# Patient Record
Sex: FEMALE | Race: WHITE | Employment: OTHER | URBAN - METROPOLITAN AREA
[De-identification: names, ages, dates, MRNs, and addresses within clinical notes are randomized per-mention and may not be internally consistent; named-entity substitution may affect disease eponyms.]

---

## 2017-02-02 ENCOUNTER — OFFICE VISIT (OUTPATIENT)
Dept: CARDIOLOGY CLINIC | Age: 77
End: 2017-02-02

## 2017-02-02 VITALS
HEIGHT: 63 IN | SYSTOLIC BLOOD PRESSURE: 136 MMHG | BODY MASS INDEX: 26.54 KG/M2 | OXYGEN SATURATION: 97 % | HEART RATE: 60 BPM | WEIGHT: 149.8 LBS | DIASTOLIC BLOOD PRESSURE: 72 MMHG

## 2017-02-02 DIAGNOSIS — I48.0 PAROXYSMAL ATRIAL FIBRILLATION (HCC): ICD-10-CM

## 2017-02-02 DIAGNOSIS — R94.39 ABNORMAL STRESS TEST: Primary | ICD-10-CM

## 2017-02-02 PROCEDURE — G8400 PT W/DXA NO RESULTS DOC: HCPCS | Performed by: INTERNAL MEDICINE

## 2017-02-02 PROCEDURE — 93000 ELECTROCARDIOGRAM COMPLETE: CPT | Performed by: INTERNAL MEDICINE

## 2017-02-02 PROCEDURE — G8484 FLU IMMUNIZE NO ADMIN: HCPCS | Performed by: INTERNAL MEDICINE

## 2017-02-02 PROCEDURE — G8427 DOCREV CUR MEDS BY ELIG CLIN: HCPCS | Performed by: INTERNAL MEDICINE

## 2017-02-02 PROCEDURE — 1090F PRES/ABSN URINE INCON ASSESS: CPT | Performed by: INTERNAL MEDICINE

## 2017-02-02 PROCEDURE — 1123F ACP DISCUSS/DSCN MKR DOCD: CPT | Performed by: INTERNAL MEDICINE

## 2017-02-02 PROCEDURE — 1036F TOBACCO NON-USER: CPT | Performed by: INTERNAL MEDICINE

## 2017-02-02 PROCEDURE — 99204 OFFICE O/P NEW MOD 45 MIN: CPT | Performed by: INTERNAL MEDICINE

## 2017-02-02 PROCEDURE — G8420 CALC BMI NORM PARAMETERS: HCPCS | Performed by: INTERNAL MEDICINE

## 2017-02-02 PROCEDURE — 4040F PNEUMOC VAC/ADMIN/RCVD: CPT | Performed by: INTERNAL MEDICINE

## 2017-02-02 PROCEDURE — G8598 ASA/ANTIPLAT THER USED: HCPCS | Performed by: INTERNAL MEDICINE

## 2017-02-02 RX ORDER — HYDROCHLOROTHIAZIDE 25 MG/1
25 TABLET ORAL DAILY PRN
Status: ON HOLD | COMMUNITY
End: 2017-02-03 | Stop reason: HOSPADM

## 2017-02-02 RX ORDER — CLOPIDOGREL BISULFATE 75 MG/1
75 TABLET ORAL DAILY
COMMUNITY
End: 2018-01-09 | Stop reason: SDUPTHER

## 2017-02-02 RX ORDER — AMLODIPINE BESYLATE 10 MG/1
10 TABLET ORAL DAILY
COMMUNITY
End: 2018-01-09 | Stop reason: SDUPTHER

## 2017-03-01 ENCOUNTER — OFFICE VISIT (OUTPATIENT)
Dept: CARDIOLOGY CLINIC | Age: 77
End: 2017-03-01

## 2017-03-01 VITALS
BODY MASS INDEX: 27.15 KG/M2 | WEIGHT: 153.25 LBS | DIASTOLIC BLOOD PRESSURE: 60 MMHG | SYSTOLIC BLOOD PRESSURE: 132 MMHG

## 2017-03-01 DIAGNOSIS — I48.0 PAROXYSMAL ATRIAL FIBRILLATION (HCC): Chronic | ICD-10-CM

## 2017-03-01 DIAGNOSIS — I10 ESSENTIAL HYPERTENSION, BENIGN: Primary | Chronic | ICD-10-CM

## 2017-03-01 DIAGNOSIS — E78.2 MIXED HYPERLIPIDEMIA: Chronic | ICD-10-CM

## 2017-03-01 DIAGNOSIS — I25.10 ATHEROSCLEROSIS OF NATIVE CORONARY ARTERY OF NATIVE HEART WITHOUT ANGINA PECTORIS: Chronic | ICD-10-CM

## 2017-03-01 PROCEDURE — 1090F PRES/ABSN URINE INCON ASSESS: CPT | Performed by: NURSE PRACTITIONER

## 2017-03-01 PROCEDURE — 99214 OFFICE O/P EST MOD 30 MIN: CPT | Performed by: NURSE PRACTITIONER

## 2017-03-01 PROCEDURE — 4040F PNEUMOC VAC/ADMIN/RCVD: CPT | Performed by: NURSE PRACTITIONER

## 2017-03-01 PROCEDURE — 1036F TOBACCO NON-USER: CPT | Performed by: NURSE PRACTITIONER

## 2017-03-01 PROCEDURE — G8427 DOCREV CUR MEDS BY ELIG CLIN: HCPCS | Performed by: NURSE PRACTITIONER

## 2017-03-01 PROCEDURE — G8400 PT W/DXA NO RESULTS DOC: HCPCS | Performed by: NURSE PRACTITIONER

## 2017-03-01 PROCEDURE — G8484 FLU IMMUNIZE NO ADMIN: HCPCS | Performed by: NURSE PRACTITIONER

## 2017-03-01 PROCEDURE — G8420 CALC BMI NORM PARAMETERS: HCPCS | Performed by: NURSE PRACTITIONER

## 2017-03-01 PROCEDURE — 1123F ACP DISCUSS/DSCN MKR DOCD: CPT | Performed by: NURSE PRACTITIONER

## 2017-03-01 PROCEDURE — G8598 ASA/ANTIPLAT THER USED: HCPCS | Performed by: NURSE PRACTITIONER

## 2017-03-02 ENCOUNTER — OFFICE VISIT (OUTPATIENT)
Dept: FAMILY MEDICINE CLINIC | Age: 77
End: 2017-03-02

## 2017-03-02 ENCOUNTER — TELEPHONE (OUTPATIENT)
Dept: CARDIOLOGY CLINIC | Age: 77
End: 2017-03-02

## 2017-03-02 VITALS
WEIGHT: 151.8 LBS | HEART RATE: 62 BPM | SYSTOLIC BLOOD PRESSURE: 120 MMHG | BODY MASS INDEX: 26.89 KG/M2 | OXYGEN SATURATION: 98 % | DIASTOLIC BLOOD PRESSURE: 90 MMHG

## 2017-03-02 DIAGNOSIS — M1A.00X0 CHRONIC GOUTY ARTHROPATHY: ICD-10-CM

## 2017-03-02 DIAGNOSIS — I10 ESSENTIAL HYPERTENSION, BENIGN: Chronic | ICD-10-CM

## 2017-03-02 DIAGNOSIS — J98.4 RESTRICTIVE LUNG DISEASE: ICD-10-CM

## 2017-03-02 DIAGNOSIS — N60.19 FIBROCYSTIC BREAST, UNSPECIFIED LATERALITY: ICD-10-CM

## 2017-03-02 DIAGNOSIS — I25.10 ATHEROSCLEROSIS OF NATIVE CORONARY ARTERY OF NATIVE HEART WITHOUT ANGINA PECTORIS: Primary | Chronic | ICD-10-CM

## 2017-03-02 DIAGNOSIS — E78.2 MIXED HYPERLIPIDEMIA: Chronic | ICD-10-CM

## 2017-03-02 DIAGNOSIS — N18.30 CRF (CHRONIC RENAL FAILURE), STAGE 3 (MODERATE) (HCC): ICD-10-CM

## 2017-03-02 DIAGNOSIS — E03.9 ACQUIRED HYPOTHYROIDISM: ICD-10-CM

## 2017-03-02 PROCEDURE — G8598 ASA/ANTIPLAT THER USED: HCPCS | Performed by: FAMILY MEDICINE

## 2017-03-02 PROCEDURE — G8427 DOCREV CUR MEDS BY ELIG CLIN: HCPCS | Performed by: FAMILY MEDICINE

## 2017-03-02 PROCEDURE — G8484 FLU IMMUNIZE NO ADMIN: HCPCS | Performed by: FAMILY MEDICINE

## 2017-03-02 PROCEDURE — 99214 OFFICE O/P EST MOD 30 MIN: CPT | Performed by: FAMILY MEDICINE

## 2017-03-02 PROCEDURE — 4040F PNEUMOC VAC/ADMIN/RCVD: CPT | Performed by: FAMILY MEDICINE

## 2017-03-02 PROCEDURE — 1036F TOBACCO NON-USER: CPT | Performed by: FAMILY MEDICINE

## 2017-03-02 PROCEDURE — G8420 CALC BMI NORM PARAMETERS: HCPCS | Performed by: FAMILY MEDICINE

## 2017-03-02 PROCEDURE — G8400 PT W/DXA NO RESULTS DOC: HCPCS | Performed by: FAMILY MEDICINE

## 2017-03-02 PROCEDURE — 1090F PRES/ABSN URINE INCON ASSESS: CPT | Performed by: FAMILY MEDICINE

## 2017-03-02 PROCEDURE — 1123F ACP DISCUSS/DSCN MKR DOCD: CPT | Performed by: FAMILY MEDICINE

## 2017-03-02 ASSESSMENT — PATIENT HEALTH QUESTIONNAIRE - PHQ9
2. FEELING DOWN, DEPRESSED OR HOPELESS: 0
SUM OF ALL RESPONSES TO PHQ QUESTIONS 1-9: 0
1. LITTLE INTEREST OR PLEASURE IN DOING THINGS: 0
SUM OF ALL RESPONSES TO PHQ9 QUESTIONS 1 & 2: 0

## 2017-03-03 PROBLEM — N18.9 CRF (CHRONIC RENAL FAILURE): Status: ACTIVE | Noted: 2017-03-03

## 2017-03-03 PROBLEM — N60.19 FIBROCYSTIC BREAST: Status: ACTIVE | Noted: 2017-03-03

## 2017-03-13 ENCOUNTER — TELEPHONE (OUTPATIENT)
Dept: FAMILY MEDICINE CLINIC | Age: 77
End: 2017-03-13

## 2017-03-13 RX ORDER — LEVOTHYROXINE SODIUM 137 UG/1
137 TABLET ORAL DAILY
Qty: 30 TABLET | Refills: 1 | Status: SHIPPED | OUTPATIENT
Start: 2017-03-13 | End: 2017-04-12 | Stop reason: SDUPTHER

## 2017-03-29 ENCOUNTER — EPISODE CHANGES (OUTPATIENT)
Dept: CASE MANAGEMENT | Age: 77
End: 2017-03-29

## 2017-03-30 ENCOUNTER — CARE COORDINATION (OUTPATIENT)
Dept: CASE MANAGEMENT | Age: 77
End: 2017-03-30

## 2017-03-30 RX ORDER — FUROSEMIDE 20 MG/1
20 TABLET ORAL 2 TIMES DAILY
Qty: 60 TABLET | Refills: 3 | Status: SHIPPED | OUTPATIENT
Start: 2017-03-30 | End: 2017-04-12 | Stop reason: CLARIF

## 2017-03-31 ENCOUNTER — OFFICE VISIT (OUTPATIENT)
Dept: ORTHOPEDIC SURGERY | Age: 77
End: 2017-03-31

## 2017-03-31 ENCOUNTER — EPISODE CHANGES (OUTPATIENT)
Dept: CASE MANAGEMENT | Age: 77
End: 2017-03-31

## 2017-03-31 VITALS — HEIGHT: 63 IN | WEIGHT: 142 LBS | BODY MASS INDEX: 25.16 KG/M2

## 2017-03-31 DIAGNOSIS — S72.002D CLOSED LEFT HIP FRACTURE, WITH ROUTINE HEALING, SUBSEQUENT ENCOUNTER: Primary | ICD-10-CM

## 2017-03-31 PROCEDURE — 73502 X-RAY EXAM HIP UNI 2-3 VIEWS: CPT | Performed by: ORTHOPAEDIC SURGERY

## 2017-03-31 PROCEDURE — 99024 POSTOP FOLLOW-UP VISIT: CPT | Performed by: ORTHOPAEDIC SURGERY

## 2017-04-11 ENCOUNTER — TELEPHONE (OUTPATIENT)
Dept: CARDIOLOGY CLINIC | Age: 77
End: 2017-04-11

## 2017-04-12 ENCOUNTER — OFFICE VISIT (OUTPATIENT)
Dept: CARDIOLOGY CLINIC | Age: 77
End: 2017-04-12

## 2017-04-12 ENCOUNTER — OFFICE VISIT (OUTPATIENT)
Dept: FAMILY MEDICINE CLINIC | Age: 77
End: 2017-04-12

## 2017-04-12 VITALS
DIASTOLIC BLOOD PRESSURE: 60 MMHG | OXYGEN SATURATION: 98 % | SYSTOLIC BLOOD PRESSURE: 134 MMHG | HEART RATE: 60 BPM | WEIGHT: 145 LBS | BODY MASS INDEX: 25.69 KG/M2

## 2017-04-12 VITALS
HEART RATE: 66 BPM | SYSTOLIC BLOOD PRESSURE: 110 MMHG | BODY MASS INDEX: 25.79 KG/M2 | OXYGEN SATURATION: 96 % | WEIGHT: 145.6 LBS | DIASTOLIC BLOOD PRESSURE: 72 MMHG

## 2017-04-12 DIAGNOSIS — E78.2 MIXED HYPERLIPIDEMIA: Chronic | ICD-10-CM

## 2017-04-12 DIAGNOSIS — J98.4 RESTRICTIVE LUNG DISEASE: ICD-10-CM

## 2017-04-12 DIAGNOSIS — I10 ESSENTIAL HYPERTENSION, BENIGN: Primary | Chronic | ICD-10-CM

## 2017-04-12 DIAGNOSIS — S72.002S CLOSED LEFT HIP FRACTURE, SEQUELA: Primary | ICD-10-CM

## 2017-04-12 DIAGNOSIS — N18.30 CRF (CHRONIC RENAL FAILURE), STAGE 3 (MODERATE) (HCC): ICD-10-CM

## 2017-04-12 DIAGNOSIS — I48.0 PAROXYSMAL ATRIAL FIBRILLATION (HCC): Chronic | ICD-10-CM

## 2017-04-12 DIAGNOSIS — I25.10 ATHEROSCLEROSIS OF NATIVE CORONARY ARTERY OF NATIVE HEART WITHOUT ANGINA PECTORIS: Chronic | ICD-10-CM

## 2017-04-12 DIAGNOSIS — M10.9 GOUT, UNSPECIFIED CAUSE, UNSPECIFIED CHRONICITY, UNSPECIFIED SITE: ICD-10-CM

## 2017-04-12 DIAGNOSIS — M1A.00X0 CHRONIC GOUTY ARTHROPATHY: ICD-10-CM

## 2017-04-12 DIAGNOSIS — I10 ESSENTIAL HYPERTENSION, BENIGN: Chronic | ICD-10-CM

## 2017-04-12 PROCEDURE — G8420 CALC BMI NORM PARAMETERS: HCPCS | Performed by: NURSE PRACTITIONER

## 2017-04-12 PROCEDURE — 4040F PNEUMOC VAC/ADMIN/RCVD: CPT | Performed by: NURSE PRACTITIONER

## 2017-04-12 PROCEDURE — 1123F ACP DISCUSS/DSCN MKR DOCD: CPT | Performed by: NURSE PRACTITIONER

## 2017-04-12 PROCEDURE — G8598 ASA/ANTIPLAT THER USED: HCPCS | Performed by: NURSE PRACTITIONER

## 2017-04-12 PROCEDURE — 1111F DSCHRG MED/CURRENT MED MERGE: CPT | Performed by: NURSE PRACTITIONER

## 2017-04-12 PROCEDURE — G8400 PT W/DXA NO RESULTS DOC: HCPCS | Performed by: NURSE PRACTITIONER

## 2017-04-12 PROCEDURE — 99214 OFFICE O/P EST MOD 30 MIN: CPT | Performed by: NURSE PRACTITIONER

## 2017-04-12 PROCEDURE — G8427 DOCREV CUR MEDS BY ELIG CLIN: HCPCS | Performed by: NURSE PRACTITIONER

## 2017-04-12 PROCEDURE — 1036F TOBACCO NON-USER: CPT | Performed by: NURSE PRACTITIONER

## 2017-04-12 PROCEDURE — 1090F PRES/ABSN URINE INCON ASSESS: CPT | Performed by: NURSE PRACTITIONER

## 2017-04-12 RX ORDER — LEVOTHYROXINE SODIUM 137 UG/1
137 TABLET ORAL DAILY
Qty: 90 TABLET | Refills: 1 | Status: SHIPPED | OUTPATIENT
Start: 2017-04-12 | End: 2017-11-15 | Stop reason: SDUPTHER

## 2017-04-12 RX ORDER — ALLOPURINOL 100 MG/1
300 TABLET ORAL DAILY
Qty: 90 TABLET | Refills: 1
Start: 2017-04-12 | End: 2017-08-10

## 2017-04-24 ENCOUNTER — TELEPHONE (OUTPATIENT)
Dept: CARDIOLOGY CLINIC | Age: 77
End: 2017-04-24

## 2017-04-24 PROBLEM — Z95.0 PACEMAKER: Status: ACTIVE | Noted: 2017-04-24

## 2017-04-25 ENCOUNTER — TELEPHONE (OUTPATIENT)
Dept: CARDIOLOGY CLINIC | Age: 77
End: 2017-04-25

## 2017-04-27 PROCEDURE — 99495 TRANSJ CARE MGMT MOD F2F 14D: CPT | Performed by: FAMILY MEDICINE

## 2017-05-02 ENCOUNTER — OFFICE VISIT (OUTPATIENT)
Dept: FAMILY MEDICINE CLINIC | Age: 77
End: 2017-05-02

## 2017-05-02 VITALS
BODY MASS INDEX: 26 KG/M2 | WEIGHT: 146.8 LBS | DIASTOLIC BLOOD PRESSURE: 78 MMHG | TEMPERATURE: 97.6 F | SYSTOLIC BLOOD PRESSURE: 110 MMHG

## 2017-05-02 DIAGNOSIS — R39.15 URINARY URGENCY: Primary | ICD-10-CM

## 2017-05-02 DIAGNOSIS — N30.01 ACUTE CYSTITIS WITH HEMATURIA: ICD-10-CM

## 2017-05-02 LAB
BACTERIA URINE, POC: ABNORMAL
BILIRUBIN URINE: 0 MG/DL
BLOOD, URINE: POSITIVE
CASTS URINE, POC: ABNORMAL
CLARITY: ABNORMAL
COLOR: YELLOW
CRYSTALS URINE, POC: ABNORMAL
EPI CELLS URINE, POC: ABNORMAL
GLUCOSE URINE: ABNORMAL
KETONES, URINE: NEGATIVE
LEUKOCYTE EST, POC: ABNORMAL
NITRITE, URINE: NEGATIVE
PH UA: 5.5 (ref 4.5–8)
PROTEIN UA: POSITIVE
RBC URINE, POC: ABNORMAL
SPECIFIC GRAVITY UA: 1.03 (ref 1–1.03)
UROBILINOGEN, URINE: NORMAL
WBC URINE, POC: ABNORMAL
YEAST URINE, POC: ABNORMAL

## 2017-05-02 PROCEDURE — 4040F PNEUMOC VAC/ADMIN/RCVD: CPT | Performed by: FAMILY MEDICINE

## 2017-05-02 PROCEDURE — 81000 URINALYSIS NONAUTO W/SCOPE: CPT | Performed by: FAMILY MEDICINE

## 2017-05-02 PROCEDURE — G8400 PT W/DXA NO RESULTS DOC: HCPCS | Performed by: FAMILY MEDICINE

## 2017-05-02 PROCEDURE — 1036F TOBACCO NON-USER: CPT | Performed by: FAMILY MEDICINE

## 2017-05-02 PROCEDURE — G8598 ASA/ANTIPLAT THER USED: HCPCS | Performed by: FAMILY MEDICINE

## 2017-05-02 PROCEDURE — 99213 OFFICE O/P EST LOW 20 MIN: CPT | Performed by: FAMILY MEDICINE

## 2017-05-02 PROCEDURE — 1090F PRES/ABSN URINE INCON ASSESS: CPT | Performed by: FAMILY MEDICINE

## 2017-05-02 PROCEDURE — G8420 CALC BMI NORM PARAMETERS: HCPCS | Performed by: FAMILY MEDICINE

## 2017-05-02 PROCEDURE — 1123F ACP DISCUSS/DSCN MKR DOCD: CPT | Performed by: FAMILY MEDICINE

## 2017-05-02 PROCEDURE — G8428 CUR MEDS NOT DOCUMENT: HCPCS | Performed by: FAMILY MEDICINE

## 2017-05-02 RX ORDER — CIPROFLOXACIN 500 MG/1
500 TABLET, FILM COATED ORAL 2 TIMES DAILY
Qty: 10 TABLET | Refills: 0 | Status: SHIPPED | OUTPATIENT
Start: 2017-05-02 | End: 2017-10-05 | Stop reason: SDUPTHER

## 2017-05-05 ENCOUNTER — OFFICE VISIT (OUTPATIENT)
Dept: ORTHOPEDIC SURGERY | Age: 77
End: 2017-05-05

## 2017-05-05 VITALS
SYSTOLIC BLOOD PRESSURE: 133 MMHG | TEMPERATURE: 96.9 F | DIASTOLIC BLOOD PRESSURE: 85 MMHG | WEIGHT: 146 LBS | BODY MASS INDEX: 25.87 KG/M2 | HEART RATE: 126 BPM | HEIGHT: 63 IN

## 2017-05-05 DIAGNOSIS — S72.002D CLOSED LEFT HIP FRACTURE, WITH ROUTINE HEALING, SUBSEQUENT ENCOUNTER: Primary | ICD-10-CM

## 2017-05-05 PROCEDURE — 99024 POSTOP FOLLOW-UP VISIT: CPT | Performed by: ORTHOPAEDIC SURGERY

## 2017-05-05 PROCEDURE — 73502 X-RAY EXAM HIP UNI 2-3 VIEWS: CPT | Performed by: ORTHOPAEDIC SURGERY

## 2017-05-09 ENCOUNTER — OFFICE VISIT (OUTPATIENT)
Dept: CARDIOLOGY CLINIC | Age: 77
End: 2017-05-09

## 2017-05-09 ENCOUNTER — PROCEDURE VISIT (OUTPATIENT)
Dept: CARDIOLOGY CLINIC | Age: 77
End: 2017-05-09

## 2017-05-09 VITALS
DIASTOLIC BLOOD PRESSURE: 88 MMHG | HEART RATE: 102 BPM | OXYGEN SATURATION: 97 % | BODY MASS INDEX: 26.22 KG/M2 | WEIGHT: 148 LBS | SYSTOLIC BLOOD PRESSURE: 128 MMHG

## 2017-05-09 DIAGNOSIS — I25.10 ATHEROSCLEROSIS OF NATIVE CORONARY ARTERY OF NATIVE HEART WITHOUT ANGINA PECTORIS: Chronic | ICD-10-CM

## 2017-05-09 DIAGNOSIS — I10 ESSENTIAL HYPERTENSION, BENIGN: Chronic | ICD-10-CM

## 2017-05-09 DIAGNOSIS — I49.5 SICK SINUS SYNDROME (HCC): ICD-10-CM

## 2017-05-09 DIAGNOSIS — I48.0 PAROXYSMAL ATRIAL FIBRILLATION (HCC): Chronic | ICD-10-CM

## 2017-05-09 DIAGNOSIS — Z95.0 PACEMAKER: Primary | ICD-10-CM

## 2017-05-09 DIAGNOSIS — J98.4 RESTRICTIVE LUNG DISEASE: ICD-10-CM

## 2017-05-09 DIAGNOSIS — E78.2 MIXED HYPERLIPIDEMIA: Primary | Chronic | ICD-10-CM

## 2017-05-09 PROCEDURE — G8400 PT W/DXA NO RESULTS DOC: HCPCS | Performed by: NURSE PRACTITIONER

## 2017-05-09 PROCEDURE — 1036F TOBACCO NON-USER: CPT | Performed by: NURSE PRACTITIONER

## 2017-05-09 PROCEDURE — 99214 OFFICE O/P EST MOD 30 MIN: CPT | Performed by: NURSE PRACTITIONER

## 2017-05-09 PROCEDURE — G8598 ASA/ANTIPLAT THER USED: HCPCS | Performed by: NURSE PRACTITIONER

## 2017-05-09 PROCEDURE — 1090F PRES/ABSN URINE INCON ASSESS: CPT | Performed by: NURSE PRACTITIONER

## 2017-05-09 PROCEDURE — 1123F ACP DISCUSS/DSCN MKR DOCD: CPT | Performed by: NURSE PRACTITIONER

## 2017-05-09 PROCEDURE — G8420 CALC BMI NORM PARAMETERS: HCPCS | Performed by: NURSE PRACTITIONER

## 2017-05-09 PROCEDURE — G8427 DOCREV CUR MEDS BY ELIG CLIN: HCPCS | Performed by: NURSE PRACTITIONER

## 2017-05-09 PROCEDURE — 4040F PNEUMOC VAC/ADMIN/RCVD: CPT | Performed by: NURSE PRACTITIONER

## 2017-05-09 PROCEDURE — 93280 PM DEVICE PROGR EVAL DUAL: CPT | Performed by: INTERNAL MEDICINE

## 2017-05-12 ENCOUNTER — OFFICE VISIT (OUTPATIENT)
Dept: FAMILY MEDICINE CLINIC | Age: 77
End: 2017-05-12

## 2017-05-12 VITALS
WEIGHT: 144.2 LBS | BODY MASS INDEX: 25.54 KG/M2 | HEART RATE: 108 BPM | SYSTOLIC BLOOD PRESSURE: 138 MMHG | DIASTOLIC BLOOD PRESSURE: 84 MMHG | OXYGEN SATURATION: 97 %

## 2017-05-12 DIAGNOSIS — I48.0 PAROXYSMAL ATRIAL FIBRILLATION (HCC): Chronic | ICD-10-CM

## 2017-05-12 DIAGNOSIS — S72.002S CLOSED LEFT HIP FRACTURE, SEQUELA: ICD-10-CM

## 2017-05-12 DIAGNOSIS — E78.2 MIXED HYPERLIPIDEMIA: Primary | Chronic | ICD-10-CM

## 2017-05-12 DIAGNOSIS — N18.30 CRF (CHRONIC RENAL FAILURE), STAGE 3 (MODERATE) (HCC): ICD-10-CM

## 2017-05-12 DIAGNOSIS — I10 ESSENTIAL HYPERTENSION, BENIGN: Chronic | ICD-10-CM

## 2017-05-12 PROCEDURE — 99214 OFFICE O/P EST MOD 30 MIN: CPT | Performed by: FAMILY MEDICINE

## 2017-05-12 PROCEDURE — 1090F PRES/ABSN URINE INCON ASSESS: CPT | Performed by: FAMILY MEDICINE

## 2017-05-12 PROCEDURE — G8598 ASA/ANTIPLAT THER USED: HCPCS | Performed by: FAMILY MEDICINE

## 2017-05-12 PROCEDURE — 1036F TOBACCO NON-USER: CPT | Performed by: FAMILY MEDICINE

## 2017-05-12 PROCEDURE — G8420 CALC BMI NORM PARAMETERS: HCPCS | Performed by: FAMILY MEDICINE

## 2017-05-12 PROCEDURE — 1123F ACP DISCUSS/DSCN MKR DOCD: CPT | Performed by: FAMILY MEDICINE

## 2017-05-12 PROCEDURE — G8427 DOCREV CUR MEDS BY ELIG CLIN: HCPCS | Performed by: FAMILY MEDICINE

## 2017-05-12 PROCEDURE — G8400 PT W/DXA NO RESULTS DOC: HCPCS | Performed by: FAMILY MEDICINE

## 2017-05-12 PROCEDURE — 4040F PNEUMOC VAC/ADMIN/RCVD: CPT | Performed by: FAMILY MEDICINE

## 2017-05-12 RX ORDER — METOPROLOL TARTRATE 50 MG/1
50 TABLET, FILM COATED ORAL 2 TIMES DAILY
Qty: 180 TABLET | Refills: 3 | Status: SHIPPED | OUTPATIENT
Start: 2017-05-12 | End: 2017-11-14 | Stop reason: SDUPTHER

## 2017-05-12 RX ORDER — HYDROCODONE BITARTRATE AND ACETAMINOPHEN 5; 325 MG/1; MG/1
1-2 TABLET ORAL EVERY 4 HOURS PRN
Qty: 100 TABLET | Refills: 0 | Status: SHIPPED | OUTPATIENT
Start: 2017-05-12 | End: 2017-08-02 | Stop reason: CLARIF

## 2017-05-30 ENCOUNTER — TELEPHONE (OUTPATIENT)
Dept: ORTHOPEDIC SURGERY | Age: 77
End: 2017-05-30

## 2017-06-07 ENCOUNTER — OFFICE VISIT (OUTPATIENT)
Dept: RHEUMATOLOGY | Age: 77
End: 2017-06-07

## 2017-06-07 VITALS
BODY MASS INDEX: 25.78 KG/M2 | DIASTOLIC BLOOD PRESSURE: 76 MMHG | WEIGHT: 145.5 LBS | HEIGHT: 63 IN | HEART RATE: 80 BPM | SYSTOLIC BLOOD PRESSURE: 136 MMHG

## 2017-06-07 DIAGNOSIS — Z79.899 ENCOUNTER FOR LONG-TERM (CURRENT) USE OF MEDICATIONS: ICD-10-CM

## 2017-06-07 DIAGNOSIS — M1A.00X0 IDIOPATHIC CHRONIC GOUT WITHOUT TOPHUS, UNSPECIFIED SITE: Primary | ICD-10-CM

## 2017-06-07 DIAGNOSIS — M81.0 OSTEOPOROSIS: ICD-10-CM

## 2017-06-07 LAB
ALBUMIN SERPL-MCNC: 4.2 G/DL (ref 3.4–5)
ALP BLD-CCNC: 111 U/L (ref 40–129)
ALT SERPL-CCNC: 17 U/L (ref 10–40)
AST SERPL-CCNC: 18 U/L (ref 15–37)
BILIRUB SERPL-MCNC: 0.5 MG/DL (ref 0–1)
BILIRUBIN DIRECT: <0.2 MG/DL (ref 0–0.3)
BILIRUBIN, INDIRECT: NORMAL MG/DL (ref 0–1)
CREAT SERPL-MCNC: 1 MG/DL (ref 0.6–1.2)
GFR AFRICAN AMERICAN: >60
GFR NON-AFRICAN AMERICAN: 54
TOTAL PROTEIN: 6.9 G/DL (ref 6.4–8.2)
URIC ACID, SERUM: 9.1 MG/DL (ref 2.6–6)
VITAMIN D 25-HYDROXY: 11.1 NG/ML

## 2017-06-07 PROCEDURE — G8427 DOCREV CUR MEDS BY ELIG CLIN: HCPCS | Performed by: INTERNAL MEDICINE

## 2017-06-07 PROCEDURE — 1090F PRES/ABSN URINE INCON ASSESS: CPT | Performed by: INTERNAL MEDICINE

## 2017-06-07 PROCEDURE — G8420 CALC BMI NORM PARAMETERS: HCPCS | Performed by: INTERNAL MEDICINE

## 2017-06-07 PROCEDURE — 1036F TOBACCO NON-USER: CPT | Performed by: INTERNAL MEDICINE

## 2017-06-07 PROCEDURE — 4040F PNEUMOC VAC/ADMIN/RCVD: CPT | Performed by: INTERNAL MEDICINE

## 2017-06-07 PROCEDURE — 99213 OFFICE O/P EST LOW 20 MIN: CPT | Performed by: INTERNAL MEDICINE

## 2017-06-07 PROCEDURE — G8400 PT W/DXA NO RESULTS DOC: HCPCS | Performed by: INTERNAL MEDICINE

## 2017-06-07 PROCEDURE — 4005F PHARM THX FOR OP RXD: CPT | Performed by: INTERNAL MEDICINE

## 2017-06-07 PROCEDURE — G8598 ASA/ANTIPLAT THER USED: HCPCS | Performed by: INTERNAL MEDICINE

## 2017-06-07 PROCEDURE — 1123F ACP DISCUSS/DSCN MKR DOCD: CPT | Performed by: INTERNAL MEDICINE

## 2017-06-09 ENCOUNTER — OFFICE VISIT (OUTPATIENT)
Dept: ORTHOPEDIC SURGERY | Age: 77
End: 2017-06-09

## 2017-06-09 VITALS — BODY MASS INDEX: 25.69 KG/M2 | TEMPERATURE: 97.1 F | HEIGHT: 63 IN | WEIGHT: 145 LBS

## 2017-06-09 DIAGNOSIS — S72.002D CLOSED LEFT HIP FRACTURE, WITH ROUTINE HEALING, SUBSEQUENT ENCOUNTER: Primary | ICD-10-CM

## 2017-06-09 PROCEDURE — 73502 X-RAY EXAM HIP UNI 2-3 VIEWS: CPT | Performed by: ORTHOPAEDIC SURGERY

## 2017-06-09 PROCEDURE — 99024 POSTOP FOLLOW-UP VISIT: CPT | Performed by: ORTHOPAEDIC SURGERY

## 2017-06-09 RX ORDER — ERGOCALCIFEROL 1.25 MG/1
50000 CAPSULE ORAL WEEKLY
Qty: 12 CAPSULE | Refills: 1 | Status: SHIPPED | OUTPATIENT
Start: 2017-06-09 | End: 2017-10-24 | Stop reason: SDUPTHER

## 2017-06-13 ENCOUNTER — HOSPITAL ENCOUNTER (OUTPATIENT)
Dept: GENERAL RADIOLOGY | Age: 77
Discharge: HOME | End: 2017-06-13
Attending: INTERNAL MEDICINE | Admitting: INTERNAL MEDICINE

## 2017-06-13 DIAGNOSIS — M81.0 OSTEOPOROSIS: ICD-10-CM

## 2017-06-13 DIAGNOSIS — M81.0 AGE-RELATED OSTEOPOROSIS WITHOUT CURRENT PATHOLOGICAL FRACTURE: ICD-10-CM

## 2017-07-28 ENCOUNTER — TELEPHONE (OUTPATIENT)
Dept: CARDIOLOGY CLINIC | Age: 77
End: 2017-07-28

## 2017-07-28 ENCOUNTER — OFFICE VISIT (OUTPATIENT)
Dept: ORTHOPEDIC SURGERY | Age: 77
End: 2017-07-28

## 2017-07-28 VITALS
DIASTOLIC BLOOD PRESSURE: 68 MMHG | SYSTOLIC BLOOD PRESSURE: 139 MMHG | HEIGHT: 64 IN | WEIGHT: 146 LBS | BODY MASS INDEX: 24.92 KG/M2 | HEART RATE: 71 BPM

## 2017-07-28 DIAGNOSIS — S72.002D CLOSED LEFT HIP FRACTURE, WITH ROUTINE HEALING, SUBSEQUENT ENCOUNTER: Primary | ICD-10-CM

## 2017-07-28 DIAGNOSIS — S39.012A SACROILIAC STRAIN, INITIAL ENCOUNTER: ICD-10-CM

## 2017-07-28 PROCEDURE — 4040F PNEUMOC VAC/ADMIN/RCVD: CPT | Performed by: ORTHOPAEDIC SURGERY

## 2017-07-28 PROCEDURE — 1036F TOBACCO NON-USER: CPT | Performed by: ORTHOPAEDIC SURGERY

## 2017-07-28 PROCEDURE — G8598 ASA/ANTIPLAT THER USED: HCPCS | Performed by: ORTHOPAEDIC SURGERY

## 2017-07-28 PROCEDURE — 73502 X-RAY EXAM HIP UNI 2-3 VIEWS: CPT | Performed by: ORTHOPAEDIC SURGERY

## 2017-07-28 PROCEDURE — G8427 DOCREV CUR MEDS BY ELIG CLIN: HCPCS | Performed by: ORTHOPAEDIC SURGERY

## 2017-07-28 PROCEDURE — G8419 CALC BMI OUT NRM PARAM NOF/U: HCPCS | Performed by: ORTHOPAEDIC SURGERY

## 2017-07-28 PROCEDURE — 99213 OFFICE O/P EST LOW 20 MIN: CPT | Performed by: ORTHOPAEDIC SURGERY

## 2017-07-28 PROCEDURE — 1123F ACP DISCUSS/DSCN MKR DOCD: CPT | Performed by: ORTHOPAEDIC SURGERY

## 2017-07-28 PROCEDURE — 1090F PRES/ABSN URINE INCON ASSESS: CPT | Performed by: ORTHOPAEDIC SURGERY

## 2017-07-28 PROCEDURE — G8399 PT W/DXA RESULTS DOCUMENT: HCPCS | Performed by: ORTHOPAEDIC SURGERY

## 2017-08-02 ENCOUNTER — OFFICE VISIT (OUTPATIENT)
Dept: CARDIOLOGY CLINIC | Age: 77
End: 2017-08-02

## 2017-08-02 VITALS
SYSTOLIC BLOOD PRESSURE: 170 MMHG | DIASTOLIC BLOOD PRESSURE: 102 MMHG | BODY MASS INDEX: 25.52 KG/M2 | WEIGHT: 144 LBS | OXYGEN SATURATION: 97 % | HEART RATE: 140 BPM | HEIGHT: 63 IN

## 2017-08-02 DIAGNOSIS — I25.10 ATHEROSCLEROSIS OF NATIVE CORONARY ARTERY OF NATIVE HEART WITHOUT ANGINA PECTORIS: Chronic | ICD-10-CM

## 2017-08-02 DIAGNOSIS — I10 ESSENTIAL HYPERTENSION, BENIGN: Chronic | ICD-10-CM

## 2017-08-02 DIAGNOSIS — E78.2 MIXED HYPERLIPIDEMIA: Chronic | ICD-10-CM

## 2017-08-02 DIAGNOSIS — I48.0 PAROXYSMAL ATRIAL FIBRILLATION (HCC): Primary | Chronic | ICD-10-CM

## 2017-08-02 DIAGNOSIS — J98.4 RESTRICTIVE LUNG DISEASE: ICD-10-CM

## 2017-08-02 PROBLEM — I48.3 TYPICAL ATRIAL FLUTTER (HCC): Status: ACTIVE | Noted: 2017-08-02

## 2017-08-02 PROCEDURE — G8598 ASA/ANTIPLAT THER USED: HCPCS | Performed by: NURSE PRACTITIONER

## 2017-08-02 PROCEDURE — 1090F PRES/ABSN URINE INCON ASSESS: CPT | Performed by: NURSE PRACTITIONER

## 2017-08-02 PROCEDURE — 4040F PNEUMOC VAC/ADMIN/RCVD: CPT | Performed by: NURSE PRACTITIONER

## 2017-08-02 PROCEDURE — G8419 CALC BMI OUT NRM PARAM NOF/U: HCPCS | Performed by: NURSE PRACTITIONER

## 2017-08-02 PROCEDURE — 1123F ACP DISCUSS/DSCN MKR DOCD: CPT | Performed by: NURSE PRACTITIONER

## 2017-08-02 PROCEDURE — 1036F TOBACCO NON-USER: CPT | Performed by: NURSE PRACTITIONER

## 2017-08-02 PROCEDURE — G8399 PT W/DXA RESULTS DOCUMENT: HCPCS | Performed by: NURSE PRACTITIONER

## 2017-08-02 PROCEDURE — 93000 ELECTROCARDIOGRAM COMPLETE: CPT | Performed by: NURSE PRACTITIONER

## 2017-08-02 PROCEDURE — G8427 DOCREV CUR MEDS BY ELIG CLIN: HCPCS | Performed by: NURSE PRACTITIONER

## 2017-08-02 PROCEDURE — 99214 OFFICE O/P EST MOD 30 MIN: CPT | Performed by: NURSE PRACTITIONER

## 2017-08-07 ENCOUNTER — TELEPHONE (OUTPATIENT)
Dept: CARDIOLOGY CLINIC | Age: 77
End: 2017-08-07

## 2017-08-08 ENCOUNTER — PROCEDURE VISIT (OUTPATIENT)
Dept: CARDIOLOGY CLINIC | Age: 77
End: 2017-08-08

## 2017-08-08 ENCOUNTER — OFFICE VISIT (OUTPATIENT)
Dept: CARDIOLOGY CLINIC | Age: 77
End: 2017-08-08

## 2017-08-08 VITALS
WEIGHT: 146.8 LBS | OXYGEN SATURATION: 97 % | HEIGHT: 63 IN | BODY MASS INDEX: 26.01 KG/M2 | DIASTOLIC BLOOD PRESSURE: 72 MMHG | HEART RATE: 70 BPM | SYSTOLIC BLOOD PRESSURE: 132 MMHG

## 2017-08-08 DIAGNOSIS — I48.3 TYPICAL ATRIAL FLUTTER (HCC): ICD-10-CM

## 2017-08-08 DIAGNOSIS — I48.0 PAROXYSMAL ATRIAL FIBRILLATION (HCC): Chronic | ICD-10-CM

## 2017-08-08 DIAGNOSIS — I47.1 ATRIAL TACHYCARDIA (HCC): ICD-10-CM

## 2017-08-08 DIAGNOSIS — Z95.0 PACEMAKER: ICD-10-CM

## 2017-08-08 DIAGNOSIS — I48.0 PAROXYSMAL ATRIAL FIBRILLATION (HCC): Primary | Chronic | ICD-10-CM

## 2017-08-08 DIAGNOSIS — I10 ESSENTIAL HYPERTENSION, BENIGN: Chronic | ICD-10-CM

## 2017-08-08 DIAGNOSIS — Z79.899 ON AMIODARONE THERAPY: ICD-10-CM

## 2017-08-08 DIAGNOSIS — I25.10 ATHEROSCLEROSIS OF NATIVE CORONARY ARTERY OF NATIVE HEART WITHOUT ANGINA PECTORIS: Chronic | ICD-10-CM

## 2017-08-08 PROBLEM — I47.19 ATRIAL TACHYCARDIA: Status: ACTIVE | Noted: 2017-08-08

## 2017-08-08 PROCEDURE — G8427 DOCREV CUR MEDS BY ELIG CLIN: HCPCS | Performed by: INTERNAL MEDICINE

## 2017-08-08 PROCEDURE — 99214 OFFICE O/P EST MOD 30 MIN: CPT | Performed by: INTERNAL MEDICINE

## 2017-08-08 PROCEDURE — 1090F PRES/ABSN URINE INCON ASSESS: CPT | Performed by: INTERNAL MEDICINE

## 2017-08-08 PROCEDURE — 4040F PNEUMOC VAC/ADMIN/RCVD: CPT | Performed by: INTERNAL MEDICINE

## 2017-08-08 PROCEDURE — 93280 PM DEVICE PROGR EVAL DUAL: CPT | Performed by: INTERNAL MEDICINE

## 2017-08-08 PROCEDURE — G8399 PT W/DXA RESULTS DOCUMENT: HCPCS | Performed by: INTERNAL MEDICINE

## 2017-08-08 PROCEDURE — 1123F ACP DISCUSS/DSCN MKR DOCD: CPT | Performed by: INTERNAL MEDICINE

## 2017-08-08 PROCEDURE — G8419 CALC BMI OUT NRM PARAM NOF/U: HCPCS | Performed by: INTERNAL MEDICINE

## 2017-08-08 PROCEDURE — G8598 ASA/ANTIPLAT THER USED: HCPCS | Performed by: INTERNAL MEDICINE

## 2017-08-08 PROCEDURE — 1036F TOBACCO NON-USER: CPT | Performed by: INTERNAL MEDICINE

## 2017-08-08 RX ORDER — AMIODARONE HYDROCHLORIDE 200 MG/1
200 TABLET ORAL DAILY
Qty: 90 TABLET | Refills: 1 | Status: SHIPPED | OUTPATIENT
Start: 2017-08-08 | End: 2017-08-30

## 2017-08-10 ENCOUNTER — OFFICE VISIT (OUTPATIENT)
Dept: FAMILY MEDICINE CLINIC | Age: 77
End: 2017-08-10

## 2017-08-10 VITALS
DIASTOLIC BLOOD PRESSURE: 72 MMHG | OXYGEN SATURATION: 98 % | WEIGHT: 144 LBS | BODY MASS INDEX: 25.51 KG/M2 | SYSTOLIC BLOOD PRESSURE: 132 MMHG | HEART RATE: 68 BPM

## 2017-08-10 DIAGNOSIS — I48.0 PAROXYSMAL ATRIAL FIBRILLATION (HCC): Chronic | ICD-10-CM

## 2017-08-10 DIAGNOSIS — E55.9 VITAMIN D DEFICIENCY: ICD-10-CM

## 2017-08-10 DIAGNOSIS — M85.80 OSTEOPENIA, UNSPECIFIED LOCATION: ICD-10-CM

## 2017-08-10 DIAGNOSIS — N18.30 CRF (CHRONIC RENAL FAILURE), STAGE 3 (MODERATE) (HCC): ICD-10-CM

## 2017-08-10 DIAGNOSIS — S72.002S CLOSED LEFT HIP FRACTURE, SEQUELA: ICD-10-CM

## 2017-08-10 DIAGNOSIS — M15.9 PRIMARY OSTEOARTHRITIS INVOLVING MULTIPLE JOINTS: Primary | ICD-10-CM

## 2017-08-10 PROBLEM — M15.0 PRIMARY OSTEOARTHRITIS INVOLVING MULTIPLE JOINTS: Status: ACTIVE | Noted: 2017-08-10

## 2017-08-10 PROCEDURE — G8427 DOCREV CUR MEDS BY ELIG CLIN: HCPCS | Performed by: FAMILY MEDICINE

## 2017-08-10 PROCEDURE — 99214 OFFICE O/P EST MOD 30 MIN: CPT | Performed by: FAMILY MEDICINE

## 2017-08-10 PROCEDURE — G8598 ASA/ANTIPLAT THER USED: HCPCS | Performed by: FAMILY MEDICINE

## 2017-08-10 PROCEDURE — 1036F TOBACCO NON-USER: CPT | Performed by: FAMILY MEDICINE

## 2017-08-10 PROCEDURE — 1123F ACP DISCUSS/DSCN MKR DOCD: CPT | Performed by: FAMILY MEDICINE

## 2017-08-10 PROCEDURE — 1090F PRES/ABSN URINE INCON ASSESS: CPT | Performed by: FAMILY MEDICINE

## 2017-08-10 PROCEDURE — G8399 PT W/DXA RESULTS DOCUMENT: HCPCS | Performed by: FAMILY MEDICINE

## 2017-08-10 PROCEDURE — G8419 CALC BMI OUT NRM PARAM NOF/U: HCPCS | Performed by: FAMILY MEDICINE

## 2017-08-10 PROCEDURE — 4040F PNEUMOC VAC/ADMIN/RCVD: CPT | Performed by: FAMILY MEDICINE

## 2017-08-10 RX ORDER — HYDROCODONE BITARTRATE AND ACETAMINOPHEN 5; 325 MG/1; MG/1
1-2 TABLET ORAL EVERY 4 HOURS PRN
Qty: 150 TABLET | Refills: 0 | Status: SHIPPED | OUTPATIENT
Start: 2017-08-10 | End: 2017-11-08 | Stop reason: SDUPTHER

## 2017-08-10 RX ORDER — ALLOPURINOL 100 MG/1
100 TABLET ORAL DAILY
COMMUNITY
End: 2017-10-24 | Stop reason: SDUPTHER

## 2017-08-10 RX ORDER — ALLOPURINOL 300 MG/1
300 TABLET ORAL DAILY
COMMUNITY
End: 2017-10-24 | Stop reason: SDUPTHER

## 2017-08-30 ENCOUNTER — OFFICE VISIT (OUTPATIENT)
Dept: CARDIOLOGY CLINIC | Age: 77
End: 2017-08-30

## 2017-08-30 VITALS
WEIGHT: 144 LBS | SYSTOLIC BLOOD PRESSURE: 144 MMHG | DIASTOLIC BLOOD PRESSURE: 72 MMHG | HEART RATE: 71 BPM | BODY MASS INDEX: 25.51 KG/M2

## 2017-08-30 DIAGNOSIS — I25.10 ATHEROSCLEROSIS OF NATIVE CORONARY ARTERY OF NATIVE HEART WITHOUT ANGINA PECTORIS: Chronic | ICD-10-CM

## 2017-08-30 DIAGNOSIS — I10 ESSENTIAL HYPERTENSION, BENIGN: Chronic | ICD-10-CM

## 2017-08-30 DIAGNOSIS — E78.2 MIXED HYPERLIPIDEMIA: Chronic | ICD-10-CM

## 2017-08-30 DIAGNOSIS — I48.0 PAROXYSMAL ATRIAL FIBRILLATION (HCC): Primary | Chronic | ICD-10-CM

## 2017-08-30 PROCEDURE — 1123F ACP DISCUSS/DSCN MKR DOCD: CPT | Performed by: NURSE PRACTITIONER

## 2017-08-30 PROCEDURE — 99213 OFFICE O/P EST LOW 20 MIN: CPT | Performed by: NURSE PRACTITIONER

## 2017-08-30 PROCEDURE — G8427 DOCREV CUR MEDS BY ELIG CLIN: HCPCS | Performed by: NURSE PRACTITIONER

## 2017-08-30 PROCEDURE — G8399 PT W/DXA RESULTS DOCUMENT: HCPCS | Performed by: NURSE PRACTITIONER

## 2017-08-30 PROCEDURE — 93000 ELECTROCARDIOGRAM COMPLETE: CPT | Performed by: NURSE PRACTITIONER

## 2017-08-30 PROCEDURE — 1036F TOBACCO NON-USER: CPT | Performed by: NURSE PRACTITIONER

## 2017-08-30 PROCEDURE — G8419 CALC BMI OUT NRM PARAM NOF/U: HCPCS | Performed by: NURSE PRACTITIONER

## 2017-08-30 PROCEDURE — 1090F PRES/ABSN URINE INCON ASSESS: CPT | Performed by: NURSE PRACTITIONER

## 2017-08-30 PROCEDURE — 4040F PNEUMOC VAC/ADMIN/RCVD: CPT | Performed by: NURSE PRACTITIONER

## 2017-08-30 PROCEDURE — G8598 ASA/ANTIPLAT THER USED: HCPCS | Performed by: NURSE PRACTITIONER

## 2017-10-05 ENCOUNTER — OFFICE VISIT (OUTPATIENT)
Dept: FAMILY MEDICINE CLINIC | Age: 77
End: 2017-10-05

## 2017-10-05 VITALS
SYSTOLIC BLOOD PRESSURE: 104 MMHG | HEART RATE: 62 BPM | OXYGEN SATURATION: 98 % | DIASTOLIC BLOOD PRESSURE: 64 MMHG | TEMPERATURE: 97.3 F

## 2017-10-05 DIAGNOSIS — R10.30 LOWER ABDOMINAL PAIN: ICD-10-CM

## 2017-10-05 DIAGNOSIS — R30.0 DYSURIA: Primary | ICD-10-CM

## 2017-10-05 LAB
BACTERIA URINE, POC: ABNORMAL
BILIRUBIN URINE: 0 MG/DL
BLOOD, URINE: POSITIVE
CASTS URINE, POC: ABNORMAL
CLARITY: ABNORMAL
COLOR: YELLOW
CRYSTALS URINE, POC: ABNORMAL
EPI CELLS URINE, POC: ABNORMAL
GLUCOSE URINE: ABNORMAL
KETONES, URINE: NEGATIVE
LEUKOCYTE EST, POC: ABNORMAL
NITRITE, URINE: NEGATIVE
PH UA: 5 (ref 4.5–8)
PROTEIN UA: POSITIVE
RBC URINE, POC: ABNORMAL
SPECIFIC GRAVITY UA: 1.02 (ref 1–1.03)
UROBILINOGEN, URINE: NORMAL
WBC URINE, POC: ABNORMAL
YEAST URINE, POC: ABNORMAL

## 2017-10-05 PROCEDURE — 1123F ACP DISCUSS/DSCN MKR DOCD: CPT | Performed by: FAMILY MEDICINE

## 2017-10-05 PROCEDURE — G8428 CUR MEDS NOT DOCUMENT: HCPCS | Performed by: FAMILY MEDICINE

## 2017-10-05 PROCEDURE — G8598 ASA/ANTIPLAT THER USED: HCPCS | Performed by: FAMILY MEDICINE

## 2017-10-05 PROCEDURE — 4040F PNEUMOC VAC/ADMIN/RCVD: CPT | Performed by: FAMILY MEDICINE

## 2017-10-05 PROCEDURE — G8399 PT W/DXA RESULTS DOCUMENT: HCPCS | Performed by: FAMILY MEDICINE

## 2017-10-05 PROCEDURE — 1090F PRES/ABSN URINE INCON ASSESS: CPT | Performed by: FAMILY MEDICINE

## 2017-10-05 PROCEDURE — 81000 URINALYSIS NONAUTO W/SCOPE: CPT | Performed by: FAMILY MEDICINE

## 2017-10-05 PROCEDURE — 99213 OFFICE O/P EST LOW 20 MIN: CPT | Performed by: FAMILY MEDICINE

## 2017-10-05 PROCEDURE — G8484 FLU IMMUNIZE NO ADMIN: HCPCS | Performed by: FAMILY MEDICINE

## 2017-10-05 PROCEDURE — G8417 CALC BMI ABV UP PARAM F/U: HCPCS | Performed by: FAMILY MEDICINE

## 2017-10-05 PROCEDURE — 1036F TOBACCO NON-USER: CPT | Performed by: FAMILY MEDICINE

## 2017-10-05 RX ORDER — CIPROFLOXACIN 500 MG/1
500 TABLET, FILM COATED ORAL 2 TIMES DAILY
Qty: 10 TABLET | Refills: 0 | Status: SHIPPED | OUTPATIENT
Start: 2017-10-05 | End: 2017-10-10

## 2017-10-05 NOTE — MR AVS SNAPSHOT
After Visit Summary             Gauri Verma   10/5/2017 3:50 PM   Office Visit    Description:  Female : 1940   Provider:  Geri Wise MD   Department:  [place] [address] and Future Appointments         Below is a list of your follow-up and future appointments. This may not be a complete list as you may have made appointments directly with providers that we are not aware of or your providers may have made some for you. Please call your providers to confirm appointments. It is important to keep your appointments. Please bring your current insurance card, photo ID, co-pay, and all medication bottles to your appointment. If self-pay, payment is expected at the time of service. Your To-Do List     Future Appointments Provider Department Dept Phone    10/24/2017 8:45 AM Eli Denver, MD [facility] Rheumatology [phone]    Please arrive 15 minutes prior to appointment, bring photo ID and insurance card. 2017 2:10 PM Geri Wise MD [address] [facility] [phone]    Please arrive 15 minutes prior to appointment, bring photo ID and insurance card. 2017 10:00 AM Aditi JORDAN [facility] Cardiology - [place] [phone]    2017 10:00 AM Rina López MD [facility] Cardiology [facility] [phone]    Please arrive 15 minutes prior to appointment, bring photo ID and insurance card. 2018 9:00 AM SCHEDULE, Shelly PHONE [address] [facility] Cardiology [facility] [phone]    Future Orders Complete By Expires    CT ABDOMEN PELVIS WO IV CONTRAST Additional Contrast? None [52281 Custom]  2017 10/5/2018         Information from Your Visit        Department     Name Address Phone Fax    [address] [phone]      You Were Seen for:         Comments    Dysuria   [788. 1. ICD-9-CM]         Vital Signs Blood Pressure Pulse Temperature Oxygen Saturation Smoking Status       104/64 62 97.3 °F (36.3 °C) (Tympanic) 98% Former Smoker       Additional Information about your Body Mass Index (BMI)           Your BMI as listed above is considered overweight (25.0-29.9). BMI is an estimate of body fat, calculated from your height and weight. The higher your BMI, the greater your risk of heart disease, high blood pressure, type 2 diabetes, stroke, gallstones, arthritis, sleep apnea, and certain cancers. BMI is not perfect. It may overestimate body fat in athletes and people who are more muscular. If your body fat is high you can improve your BMI by decreasing your calorie intake and becoming more physically active. Learn more at: [employer]             Where to Get Your Medications      These medications were sent to [place] [address] [phone] Vik Rajput [phone]  [address] [place] [address], [address] [place] [address]     Phone:  [phone]     ciprofloxacin 500 MG tablet         Your Current Medications Are              ciprofloxacin (CIPRO) 500 MG tablet Take 1 tablet by mouth 2 times daily for 5 days    allopurinol (ZYLOPRIM) 300 MG tablet Take 300 mg by mouth daily    allopurinol (ZYLOPRIM) 100 MG tablet Take 100 mg by mouth daily    HYDROcodone-acetaminophen (NORCO) 5-325 MG per tablet Take 1-2 tablets by mouth every 4 hours as needed for Pain .  Earliest Fill Date: 8/10/17    vitamin D (ERGOCALCIFEROL) 78888 UNITS CAPS capsule Take 1 capsule by mouth once a week    metoprolol tartrate (LOPRESSOR) 50 MG tablet Take 1 tablet by mouth 2 times daily    levothyroxine (SYNTHROID) 137 MCG tablet Take 1 tablet by mouth Daily    furosemide (LASIX) 20 MG tablet Take 20 mg by mouth as needed    rivaroxaban (XARELTO) 15 MG TABS tablet Take 1 tablet by mouth daily (with breakfast)    amLODIPine (NORVASC) 10 MG tablet Take 10 mg by mouth daily Additional Information        Basic Information     Date Of Birth Sex Race Ethnicity Preferred Language    1940 Female White Non-/Non  English      Problem List as of 10/5/2017  Date Reviewed: 8/30/2017                Typical atrial flutter ([address]    Atherosclerosis of native coronary artery of native heart without angina pectoris (Chronic)    Paroxysmal atrial fibrillation (HCC) (Chronic)    Primary osteoarthritis involving multiple joints    Vitamin D deficiency    Atrial tachycardia ([address]    Pacemaker    Closed left hip fracture ([address]    Cerebrovascular accident (CVA) ([address]    CRF (chronic renal failure)    Fibrocystic breast    Allergic rhinitis    Sick sinus syndrome ([address]    Restrictive lung disease    Heart valve problem (Chronic)    Chronic gouty arthropathy    Acquired hypothyroidism    Arthritis    Essential hypertension, benign (Chronic)    Mixed hyperlipidemia (Chronic)      Your Goals as of 10/5/2017 at 4:51 PM                 Exercise    walk every day     Notes    8/10/17        Immunizations as of 10/5/2017     Name Date    H1N1 12/21/2009    Influenza Virus Vaccine 10/5/2015, 10/20/2014, 10/4/2013, 10/2/2013, 9/28/2012, 9/19/2012, 10/12/2007, 10/3/2005    Influenza Whole 9/14/2011    Influenza, High Dose 11/2/2016, 9/30/2015, 10/10/2014    Pneumococcal 13-valent Conjugate ([id number]) 12/4/2015    Pneumococcal Conjugate 7-valent 12/3/2009    Pneumococcal Polysaccharide ([id number]) 1/28/2008, 9/28/2000    Tdap (Boostrix, Adacel) 8/17/2011      Preventive Care        Date Due    Yearly Flu Vaccine (1) 9/1/2017    Cholesterol Screening 6/2/2021    Tetanus Combination Vaccine (2 - Td) 8/17/2021            [employer] Signup           Our records indicate that you have an active [employer] account. You can view your After Visit Summary by going to [email or web address] and logging in with your [employer] username and password.

## 2017-10-05 NOTE — PROGRESS NOTES
Subjective:      Patient ID: Ariadne Colon is a 68 y.o. female. Patient presents in accompaniment of daughter to discuss urinary tract infection. Patient does have a history of recurrent urinary tract infection for the last one being in May 2017. Patient drinks a lot of soda on a daily basis. And she knows this is a culprit. Dysuria    This is a new problem. Episode onset: about one week  There has been no fever. Associated symptoms include frequency and urgency. Associated symptoms comments: Abdominal pain. she also was to discuss lower abdominal discomfort that is gone now for the last 9 months to the point that awaken her at nighttime despite taking pain medication prior to going to bed. Patient describes a fullness in her lower abdomen with radiation anterior portion of her legs. She denies any change of bowel although she's noticed some harder stools and some blood. She had a colonoscopy done 2 years ago which was normal except for diverticulosis. Nor is she does not have any urinary symptoms other and now when she has infection. Food does not seem to aggravate her abdomen one where together. Review of Systems   Genitourinary: Positive for dysuria, frequency and urgency. Allergies   Allergen Reactions    Aspirin Nausea Only    Ativan [Lorazepam] Other (See Comments)     hallucinations    Diltiazem Anaphylaxis    Diltiazem Hcl Anaphylaxis    Dabigatran Nausea Only     And indigestion    Dabigatran Etexilate Mesylate Other (See Comments)     indigestion    Dabigatran Etexilate Mesylate Nausea Only     And indigestion    Iodides Other (See Comments)     Kidney disease    Mysoline [Primidone]     Nsaids     Other Other (See Comments)     Nitroglycerin patches causes severe headaches    Sulfa Antibiotics Rash       Objective:   Physical Exam   Constitutional: She appears well-developed and well-nourished. She is cooperative. Abdominal: Soft.  Normal appearance and bowel sounds are normal. She exhibits no distension and no mass. There is no hepatosplenomegaly. There is no tenderness. There is no rigidity, no rebound, no guarding and no CVA tenderness. No hernia. Neurological: She is alert. Assessment:      Troy Thibodeaux was seen today for dysuria. Diagnoses and all orders for this visit:    Dysuria  -     POC URINE with Microscopic    Lower abdominal pain  -     CT ABDOMEN PELVIS WO IV CONTRAST Additional Contrast? None; Future    Other orders  -     ciprofloxacin (CIPRO) 500 MG tablet; Take 1 tablet by mouth 2 times daily for 5 days            Plan:      Patient was advised to avoid soda pop and drinks excessive fluid a day and she was was advised to take vitamin C 500 mg daily  With the persistent lower abdominal discomfort will proceed with an abdominal pelvic CT scan and decide further management. Please note that this chart was generated using Dragon dictation software. Although every effort was made to ensure the accuracy of this automated transcription, some errors in transcription may have occurred.

## 2017-10-08 ENCOUNTER — HOSPITAL ENCOUNTER (OUTPATIENT)
Dept: CT IMAGING | Age: 77
Discharge: HOME | End: 2017-10-08
Attending: FAMILY MEDICINE | Admitting: FAMILY MEDICINE

## 2017-10-08 DIAGNOSIS — R10.30 LOWER ABDOMINAL PAIN: ICD-10-CM

## 2017-10-09 ENCOUNTER — TELEPHONE (OUTPATIENT)
Dept: FAMILY MEDICINE CLINIC | Age: 77
End: 2017-10-09

## 2017-10-09 RX ORDER — GABAPENTIN 300 MG/1
300 CAPSULE ORAL DAILY
Qty: 30 CAPSULE | Refills: 0 | Status: SHIPPED | OUTPATIENT
Start: 2017-10-09 | End: 2017-11-14 | Stop reason: ALTCHOICE

## 2017-10-09 NOTE — TELEPHONE ENCOUNTER
----- Message from Kamila Adames MD sent at 10/9/2017  7:32 AM EDT -----  CT scan with diverticulosis only. Pain in abdomen is probably coming from her back.   Begin gabapentin 300 mg daily at bedtime #30

## 2017-10-24 ENCOUNTER — OFFICE VISIT (OUTPATIENT)
Dept: RHEUMATOLOGY | Age: 77
End: 2017-10-24

## 2017-10-24 VITALS
WEIGHT: 145.13 LBS | HEIGHT: 63 IN | DIASTOLIC BLOOD PRESSURE: 70 MMHG | SYSTOLIC BLOOD PRESSURE: 114 MMHG | HEART RATE: 72 BPM | BODY MASS INDEX: 25.71 KG/M2

## 2017-10-24 DIAGNOSIS — E55.9 VITAMIN D DEFICIENCY: ICD-10-CM

## 2017-10-24 DIAGNOSIS — Z23 NEED FOR INFLUENZA VACCINATION: ICD-10-CM

## 2017-10-24 DIAGNOSIS — M80.00XD AGE-RELATED OSTEOPOROSIS WITH CURRENT PATHOLOGICAL FRACTURE WITH ROUTINE HEALING, SUBSEQUENT ENCOUNTER: ICD-10-CM

## 2017-10-24 DIAGNOSIS — M1A.00X0 CHRONIC GOUTY ARTHROPATHY: Primary | ICD-10-CM

## 2017-10-24 LAB
ALBUMIN SERPL-MCNC: 4.2 G/DL (ref 3.4–5)
ALP BLD-CCNC: 93 U/L (ref 40–129)
ALT SERPL-CCNC: 10 U/L (ref 10–40)
AST SERPL-CCNC: 12 U/L (ref 15–37)
BASOPHILS ABSOLUTE: 0.1 K/UL (ref 0–0.2)
BASOPHILS RELATIVE PERCENT: 1 %
BILIRUB SERPL-MCNC: 0.4 MG/DL (ref 0–1)
BILIRUBIN DIRECT: <0.2 MG/DL (ref 0–0.3)
BILIRUBIN, INDIRECT: ABNORMAL MG/DL (ref 0–1)
CREAT SERPL-MCNC: 1 MG/DL (ref 0.6–1.2)
EOSINOPHILS ABSOLUTE: 0.4 K/UL (ref 0–0.6)
EOSINOPHILS RELATIVE PERCENT: 4.6 %
GFR AFRICAN AMERICAN: >60
GFR NON-AFRICAN AMERICAN: 54
HCT VFR BLD CALC: 45.6 % (ref 36–48)
HEMOGLOBIN: 14.2 G/DL (ref 12–16)
LYMPHOCYTES ABSOLUTE: 1.1 K/UL (ref 1–5.1)
LYMPHOCYTES RELATIVE PERCENT: 12.5 %
MCH RBC QN AUTO: 24.3 PG (ref 26–34)
MCHC RBC AUTO-ENTMCNC: 31.2 G/DL (ref 31–36)
MCV RBC AUTO: 77.8 FL (ref 80–100)
MONOCYTES ABSOLUTE: 0.8 K/UL (ref 0–1.3)
MONOCYTES RELATIVE PERCENT: 9 %
NEUTROPHILS ABSOLUTE: 6.6 K/UL (ref 1.7–7.7)
NEUTROPHILS RELATIVE PERCENT: 72.9 %
PARATHYROID HORMONE INTACT: 61.1 PG/ML (ref 14–72)
PDW BLD-RTO: 18.4 % (ref 12.4–15.4)
PLATELET # BLD: 301 K/UL (ref 135–450)
PMV BLD AUTO: 8.9 FL (ref 5–10.5)
RBC # BLD: 5.85 M/UL (ref 4–5.2)
TOTAL PROTEIN: 6.7 G/DL (ref 6.4–8.2)
URIC ACID, SERUM: 6.3 MG/DL (ref 2.6–6)
VITAMIN D 25-HYDROXY: 18.1 NG/ML
WBC # BLD: 9.1 K/UL (ref 4–11)

## 2017-10-24 PROCEDURE — G8399 PT W/DXA RESULTS DOCUMENT: HCPCS | Performed by: INTERNAL MEDICINE

## 2017-10-24 PROCEDURE — 1036F TOBACCO NON-USER: CPT | Performed by: INTERNAL MEDICINE

## 2017-10-24 PROCEDURE — G8427 DOCREV CUR MEDS BY ELIG CLIN: HCPCS | Performed by: INTERNAL MEDICINE

## 2017-10-24 PROCEDURE — G8598 ASA/ANTIPLAT THER USED: HCPCS | Performed by: INTERNAL MEDICINE

## 2017-10-24 PROCEDURE — 1090F PRES/ABSN URINE INCON ASSESS: CPT | Performed by: INTERNAL MEDICINE

## 2017-10-24 PROCEDURE — 99213 OFFICE O/P EST LOW 20 MIN: CPT | Performed by: INTERNAL MEDICINE

## 2017-10-24 PROCEDURE — 4005F PHARM THX FOR OP RXD: CPT | Performed by: INTERNAL MEDICINE

## 2017-10-24 PROCEDURE — G0008 ADMIN INFLUENZA VIRUS VAC: HCPCS | Performed by: INTERNAL MEDICINE

## 2017-10-24 PROCEDURE — 1123F ACP DISCUSS/DSCN MKR DOCD: CPT | Performed by: INTERNAL MEDICINE

## 2017-10-24 PROCEDURE — 4040F PNEUMOC VAC/ADMIN/RCVD: CPT | Performed by: INTERNAL MEDICINE

## 2017-10-24 PROCEDURE — G8417 CALC BMI ABV UP PARAM F/U: HCPCS | Performed by: INTERNAL MEDICINE

## 2017-10-24 PROCEDURE — 90662 IIV NO PRSV INCREASED AG IM: CPT | Performed by: INTERNAL MEDICINE

## 2017-10-24 PROCEDURE — G8484 FLU IMMUNIZE NO ADMIN: HCPCS | Performed by: INTERNAL MEDICINE

## 2017-10-24 RX ORDER — ERGOCALCIFEROL 1.25 MG/1
50000 CAPSULE ORAL WEEKLY
Qty: 12 CAPSULE | Refills: 1 | Status: SHIPPED | OUTPATIENT
Start: 2017-10-24 | End: 2018-04-24 | Stop reason: SDUPTHER

## 2017-10-24 RX ORDER — ALLOPURINOL 100 MG/1
100 TABLET ORAL DAILY
Qty: 90 TABLET | Refills: 1 | Status: SHIPPED | OUTPATIENT
Start: 2017-10-24 | End: 2018-03-15

## 2017-10-24 RX ORDER — ALLOPURINOL 300 MG/1
300 TABLET ORAL DAILY
Qty: 90 TABLET | Refills: 1 | Status: SHIPPED | OUTPATIENT
Start: 2017-10-24 | End: 2018-04-24 | Stop reason: SDUPTHER

## 2017-10-24 NOTE — PROGRESS NOTES
release tablet Take 500 mg by mouth 2 times daily  Yes Historical Provider, MD   topiramate (TOPAMAX) 50 MG tablet Take 50 mg by mouth 2 times daily. Yes Historical Provider, MD   nitroGLYCERIN (NITROSTAT) 0.4 MG SL tablet Place 1 tablet under the tongue every 5 minutes as needed for Chest pain. Yes Abby Lazo, LY       Objective:   Physical Exam /70   Pulse 72   Ht 5' 3\" (1.6 m)   Wt 145 lb 2 oz (65.8 kg)   BMI 25.71 kg/m²   Alert female in no acute distress. No vertebral body tenderness. No synovitis no tophi    Assessment:      Gout. Osteoporosis       Plan:      Blood work will be obtained to monitor her vitamin D level to screen for hyperparathyroidism and to monitor effectiveness of current allopurinol therapy. We will attempt to get coverage for Forteo. I'll see the patient back in 3 months time. The patient received flu vaccine today.

## 2017-11-07 ENCOUNTER — TELEPHONE (OUTPATIENT)
Dept: INTERNAL MEDICINE CLINIC | Age: 77
End: 2017-11-07

## 2017-11-08 ENCOUNTER — OFFICE VISIT (OUTPATIENT)
Dept: FAMILY MEDICINE CLINIC | Age: 77
End: 2017-11-08

## 2017-11-08 VITALS
DIASTOLIC BLOOD PRESSURE: 82 MMHG | SYSTOLIC BLOOD PRESSURE: 132 MMHG | RESPIRATION RATE: 12 BRPM | BODY MASS INDEX: 26.11 KG/M2 | OXYGEN SATURATION: 98 % | WEIGHT: 147.38 LBS | HEART RATE: 69 BPM

## 2017-11-08 DIAGNOSIS — E55.9 VITAMIN D DEFICIENCY: ICD-10-CM

## 2017-11-08 DIAGNOSIS — N18.30 CHRONIC RENAL FAILURE, STAGE 3 (MODERATE) (HCC): ICD-10-CM

## 2017-11-08 DIAGNOSIS — M15.9 PRIMARY OSTEOARTHRITIS INVOLVING MULTIPLE JOINTS: ICD-10-CM

## 2017-11-08 DIAGNOSIS — I48.0 PAROXYSMAL ATRIAL FIBRILLATION (HCC): Chronic | ICD-10-CM

## 2017-11-08 DIAGNOSIS — R25.1 TREMOR: Primary | ICD-10-CM

## 2017-11-08 PROCEDURE — 1123F ACP DISCUSS/DSCN MKR DOCD: CPT | Performed by: FAMILY MEDICINE

## 2017-11-08 PROCEDURE — G8484 FLU IMMUNIZE NO ADMIN: HCPCS | Performed by: FAMILY MEDICINE

## 2017-11-08 PROCEDURE — 1036F TOBACCO NON-USER: CPT | Performed by: FAMILY MEDICINE

## 2017-11-08 PROCEDURE — 1090F PRES/ABSN URINE INCON ASSESS: CPT | Performed by: FAMILY MEDICINE

## 2017-11-08 PROCEDURE — G8417 CALC BMI ABV UP PARAM F/U: HCPCS | Performed by: FAMILY MEDICINE

## 2017-11-08 PROCEDURE — 4040F PNEUMOC VAC/ADMIN/RCVD: CPT | Performed by: FAMILY MEDICINE

## 2017-11-08 PROCEDURE — 99214 OFFICE O/P EST MOD 30 MIN: CPT | Performed by: FAMILY MEDICINE

## 2017-11-08 PROCEDURE — G8598 ASA/ANTIPLAT THER USED: HCPCS | Performed by: FAMILY MEDICINE

## 2017-11-08 PROCEDURE — G8399 PT W/DXA RESULTS DOCUMENT: HCPCS | Performed by: FAMILY MEDICINE

## 2017-11-08 PROCEDURE — G8427 DOCREV CUR MEDS BY ELIG CLIN: HCPCS | Performed by: FAMILY MEDICINE

## 2017-11-08 RX ORDER — TOPIRAMATE 50 MG/1
50 TABLET, FILM COATED ORAL 2 TIMES DAILY
Qty: 180 TABLET | Refills: 1 | Status: SHIPPED | OUTPATIENT
Start: 2017-11-08 | End: 2018-01-10

## 2017-11-08 RX ORDER — HYDROCODONE BITARTRATE AND ACETAMINOPHEN 5; 325 MG/1; MG/1
1-2 TABLET ORAL EVERY 4 HOURS PRN
Qty: 150 TABLET | Refills: 0 | Status: SHIPPED | OUTPATIENT
Start: 2017-11-08 | End: 2018-02-07 | Stop reason: SDUPTHER

## 2017-11-08 NOTE — PROGRESS NOTES
tablet 1    vitamin D (ERGOCALCIFEROL) 52319 units CAPS capsule Take 1 capsule by mouth once a week 12 capsule 1    gabapentin (NEURONTIN) 300 MG capsule Take 1 capsule by mouth daily 30 capsule 0    HYDROcodone-acetaminophen (NORCO) 5-325 MG per tablet Take 1-2 tablets by mouth every 4 hours as needed for Pain . Earliest Fill Date: 8/10/17 150 tablet 0    metoprolol tartrate (LOPRESSOR) 50 MG tablet Take 1 tablet by mouth 2 times daily 180 tablet 3    levothyroxine (SYNTHROID) 137 MCG tablet Take 1 tablet by mouth Daily 90 tablet 1    furosemide (LASIX) 20 MG tablet Take 20 mg by mouth as needed      rivaroxaban (XARELTO) 15 MG TABS tablet Take 1 tablet by mouth daily (with breakfast) 90 tablet 3    amLODIPine (NORVASC) 10 MG tablet Take 10 mg by mouth daily      clopidogrel (PLAVIX) 75 MG tablet Take 75 mg by mouth daily      tiZANidine (ZANAFLEX) 4 MG tablet Take 4 mg by mouth every 6 hours as needed       ranolazine (RANEXA) 500 MG extended release tablet Take 500 mg by mouth 2 times daily       topiramate (TOPAMAX) 50 MG tablet Take 50 mg by mouth 2 times daily.  nitroGLYCERIN (NITROSTAT) 0.4 MG SL tablet Place 1 tablet under the tongue every 5 minutes as needed for Chest pain.  25 tablet 1       Allergies   Allergen Reactions    Aspirin Nausea Only    Ativan [Lorazepam] Other (See Comments)     hallucinations    Diltiazem Anaphylaxis    Diltiazem Hcl Anaphylaxis    Dabigatran Nausea Only     And indigestion    Dabigatran Etexilate Mesylate Other (See Comments)     indigestion    Dabigatran Etexilate Mesylate Nausea Only     And indigestion    Iodides Other (See Comments)     Kidney disease    Mysoline [Primidone]     Nsaids     Other Other (See Comments)     Nitroglycerin patches causes severe headaches    Sulfa Antibiotics Rash       Social History   Substance Use Topics    Smoking status: Former Smoker     Packs/day: 1.00     Years: 28.00     Types: Cigarettes     Quit date: behaviors: nutrition and exercise     Patient given educational materials     Discussed use, benefit, and side effects of prescribed medications. Barriers to medication compliance addressed. All patient questions answered. Pt voiced understanding. Patient needs RTC in 3 months. Please note that this chart was generated using Dragon dictation software. Although every effort was made to ensure the accuracy of this automated transcription, some errors in transcription may have occurred.

## 2017-11-13 ENCOUNTER — OFFICE VISIT (OUTPATIENT)
Dept: FAMILY MEDICINE CLINIC | Age: 77
End: 2017-11-13

## 2017-11-13 VITALS
HEART RATE: 72 BPM | OXYGEN SATURATION: 96 % | SYSTOLIC BLOOD PRESSURE: 110 MMHG | RESPIRATION RATE: 12 BRPM | DIASTOLIC BLOOD PRESSURE: 70 MMHG

## 2017-11-13 DIAGNOSIS — L57.0 ACTINIC KERATOSIS: Primary | ICD-10-CM

## 2017-11-13 PROCEDURE — 17000 DESTRUCT PREMALG LESION: CPT | Performed by: FAMILY MEDICINE

## 2017-11-14 ENCOUNTER — OFFICE VISIT (OUTPATIENT)
Dept: CARDIOLOGY CLINIC | Age: 77
End: 2017-11-14

## 2017-11-14 ENCOUNTER — PROCEDURE VISIT (OUTPATIENT)
Dept: CARDIOLOGY CLINIC | Age: 77
End: 2017-11-14

## 2017-11-14 VITALS
DIASTOLIC BLOOD PRESSURE: 80 MMHG | OXYGEN SATURATION: 96 % | HEIGHT: 63 IN | HEART RATE: 74 BPM | SYSTOLIC BLOOD PRESSURE: 128 MMHG | BODY MASS INDEX: 25.89 KG/M2 | WEIGHT: 146.12 LBS

## 2017-11-14 DIAGNOSIS — Z95.0 PACEMAKER: ICD-10-CM

## 2017-11-14 DIAGNOSIS — I47.1 ATRIAL TACHYCARDIA (HCC): ICD-10-CM

## 2017-11-14 DIAGNOSIS — I25.10 ATHEROSCLEROSIS OF NATIVE CORONARY ARTERY OF NATIVE HEART WITHOUT ANGINA PECTORIS: Chronic | ICD-10-CM

## 2017-11-14 DIAGNOSIS — I10 ESSENTIAL HYPERTENSION, BENIGN: Chronic | ICD-10-CM

## 2017-11-14 DIAGNOSIS — I48.3 TYPICAL ATRIAL FLUTTER (HCC): ICD-10-CM

## 2017-11-14 DIAGNOSIS — I49.5 SICK SINUS SYNDROME (HCC): ICD-10-CM

## 2017-11-14 DIAGNOSIS — I48.0 PAROXYSMAL ATRIAL FIBRILLATION (HCC): Primary | Chronic | ICD-10-CM

## 2017-11-14 PROCEDURE — 4040F PNEUMOC VAC/ADMIN/RCVD: CPT | Performed by: INTERNAL MEDICINE

## 2017-11-14 PROCEDURE — 1123F ACP DISCUSS/DSCN MKR DOCD: CPT | Performed by: INTERNAL MEDICINE

## 2017-11-14 PROCEDURE — G8399 PT W/DXA RESULTS DOCUMENT: HCPCS | Performed by: INTERNAL MEDICINE

## 2017-11-14 PROCEDURE — G8427 DOCREV CUR MEDS BY ELIG CLIN: HCPCS | Performed by: INTERNAL MEDICINE

## 2017-11-14 PROCEDURE — 1036F TOBACCO NON-USER: CPT | Performed by: INTERNAL MEDICINE

## 2017-11-14 PROCEDURE — G8417 CALC BMI ABV UP PARAM F/U: HCPCS | Performed by: INTERNAL MEDICINE

## 2017-11-14 PROCEDURE — 93280 PM DEVICE PROGR EVAL DUAL: CPT | Performed by: INTERNAL MEDICINE

## 2017-11-14 PROCEDURE — G8598 ASA/ANTIPLAT THER USED: HCPCS | Performed by: INTERNAL MEDICINE

## 2017-11-14 PROCEDURE — 1090F PRES/ABSN URINE INCON ASSESS: CPT | Performed by: INTERNAL MEDICINE

## 2017-11-14 PROCEDURE — 99214 OFFICE O/P EST MOD 30 MIN: CPT | Performed by: INTERNAL MEDICINE

## 2017-11-14 PROCEDURE — G8484 FLU IMMUNIZE NO ADMIN: HCPCS | Performed by: INTERNAL MEDICINE

## 2017-11-14 RX ORDER — METOPROLOL TARTRATE 100 MG/1
100 TABLET ORAL 2 TIMES DAILY
Qty: 180 TABLET | Refills: 3 | Status: SHIPPED | OUTPATIENT
Start: 2017-11-14 | End: 2019-12-02 | Stop reason: SDUPTHER

## 2017-11-14 NOTE — PROGRESS NOTES
[facility]   Electrophysiology   Date: 11/14/2017  I had the privilege of visiting Ken Marroquin in the office. CC: Hx of Atrial arrhythmias, heart fluttering    HPI: Ken Marroquin is a 68 y.o. is here for a follow up visit. She has a prior history of Atrial fib and underwent cardioversion in 2008. She had recurrent Atrial fib and underwent RFCA on 4/24/09 by Dr. Crys Carvajal. Amiodarone therapy was discussed with patient in 2014. She has a history of TIA which is treated with Plavix. She is anticoagulated with Xarelto 15 mg daily. She underwent dual chamber pacemaker ([employer]) on 11/8/13 for SSS, AV block at [facility].  She has significant coronary disease with multiple interventions in the past.  Stress test was performed on 5/29/12 that showed normal myocardial perfusion. She was later started on Ranexa with up-titration of dose. On 2/2/17 she had a LHC which showed stable coronary findings. PFTs showed severe obstructive disease, breathing improved after administering inhaler. She was seen by Meryle Daunt NP on 8/2/17 and reported having palpitations and chest pain for one month. ECG was done which showed atrial flutter rate in the 140's. She is anticoagulated with Xarelto 15 mg daily. She underwent cardioversion by Dr. Nan Vides on 8/2/17.        Interval History: Today she reports intermittent heart fluttering with rates as high as 140-150, now occurring more frequently. She takes additional dose of Metoprolol 50 mg when fluttering persists. She was prescribed Amiodarone, but did not start the medication after reading about potential side effects, specifically how it could affect reflexes and memory. She manages the finances and is the  for her family. She is compliant with Xarelto and denies bleeding or unusual bruising. She has shortness of breath with exertion, but is unchanged from the last visit. She was told she does not have COPD.   She denies exertional pack-year smoking history. She has never used smokeless tobacco. She reports that she does not drink alcohol or use drugs. Family History:  Reviewed. family history includes Cancer in her maternal grandmother, paternal grandmother, and sister; Diabetes in her brother and maternal uncle; Heart Disease in her brother, maternal aunt, and sister; Hypertension in her brother and paternal aunt; Stroke in her maternal aunt. Review of System:  All other systems reviewed Pertinent negatives and positives are:     · General: - fever, - chills  · Ophthalmic ROS: - eye pain or loss of vision  · ENT - headaches, - sore throat   · Respiratory: - cough, - sputum + shortness of breath unchanged  · Cardiovascular: Reviewed in HPI  · Gastrointestinal: - abdominal pain, - diarrhea, - N/V   · Hematology: - bleeding, - blood clots, - bruising - jaundice     · Genito-Urinary:  - Dysuria or incontinence    · Musculoskeletal: - Joint swelling, - muscle pain    · Neurological: - confusion, - dizziness, - headaches   · Psychiatric: - anxiety, - depression   · Dermatological: - rash        Physical Examination:  Vitals:    11/14/17 0957   BP: 128/80   Pulse: 74   SpO2: 96%      Vitals:    11/14/17 0957   BP: 128/80   Pulse: 74   SpO2: 96%       Wt Readings from Last 3 Encounters:   11/14/17 146 lb 1.9 oz (66.3 kg)   11/08/17 147 lb 6 oz (66.8 kg)   10/24/17 145 lb 2 oz (65.8 kg)     · Constitutional: Oriented. No distress. · Head: Normocephalic and atraumatic. · Mouth/Throat: Oropharynx is clear and moist.   · Eyes: Conjunctivae normal. EOM are normal.   · Neck: Neck supple. No rigidity. No JVD present. · Cardiovascular: Normal rate, regular rhythm, S1&S2 without murmur, gallop, or rub. · Pulmonary/Chest: Bilateral respiratory sounds. No wheezes, No rhonchi. · Abdominal: Soft. Bowel sounds present. No distension, No tenderness. · Musculoskeletal: No tenderness.  No edema    · Lymphadenopathy: Has no cervical adenopathy. · Neurological: Alert and oriented. Cranial nerve appears intact, No Gross deficit   · Skin: Skin is warm and dry. No rash noted. · Psychiatric: Has a normal behavior       Labs:  Reviewed. ECG: today - Atrial paced rhythm, PVCs  Echo: 2/17/16 ([facility])  Conclusions: Normal LV size and systolic function Mild to moderate mitral  regurgitation Mild tricuspid regurgitation  Findings:   Left Atrium: Mild to moderate enlargement of the left atrium. Left Ventricle: Upper limits of normal left ventricle size. No left ventricular  hypertrophy. Normal left ventricular systolic function. The ejection fraction   Is visually estimated to be 55 %. Right Atrium: Normal right atrial size. RightVentricle: Normal right ventricle size. Normal right ventricular function. Aortic Valve: Tri-leaflet aortic valve. Mild aortic cusp calcification. No  aortic valve stenosis. Mild (1+) aortic valve regurgitation. Aorta: No dilatation of the aortic root. IVC/PA: Normal IVC size and normal respiratory  collapse consistent with normal right atrial pressure. Mitral Valve: Mild mitral valve leaflet calcification. Mild to moderate (2+) mitral valve  regurgitation. No mitral valve stenosis. Tricuspid Valve: Mild (1+) tricuspid  regurgitation. The pulmonary artery pressure is normal.   Pulmonic Valve: Mild  pulmonic regurgitation. Pericardium: Normal pericardium with no significant  pericardial effusion.     Nuc stress 6/28/16: ([facility])   Final Conclusions/Summary  Stress ECG Impressions: No significant ST changes during vasodilator infusion  Summary: - Abnormal moderat risk stress test with moderate size and severity  anterolateral wall ischemia - Normal LV size and systolic function  Cath: 9/5/89 Cath: which showed 100% prox RCA with bridging collateral, 100% SVG to RCA, 100% prox LAD after D1, D1 stent widely patent, Mild Cx disease, Normal LV FXN--EF 60%--EDP 12 post hydration.               Stable anatomy    Medication:  Current Outpatient Prescriptions   Medication Sig Dispense Refill    HYDROcodone-acetaminophen (NORCO) 5-325 MG per tablet Take 1-2 tablets by mouth every 4 hours as needed for Pain . Earliest Fill Date: 11/8/17 150 tablet 0    topiramate (TOPAMAX) 50 MG tablet Take 1 tablet by mouth 2 times daily (Patient taking differently: Take 100 mg by mouth 2 times daily ) 180 tablet 1    allopurinol (ZYLOPRIM) 300 MG tablet Take 1 tablet by mouth daily 90 tablet 1    allopurinol (ZYLOPRIM) 100 MG tablet Take 1 tablet by mouth daily 90 tablet 1    vitamin D (ERGOCALCIFEROL) 47784 units CAPS capsule Take 1 capsule by mouth once a week 12 capsule 1    metoprolol tartrate (LOPRESSOR) 50 MG tablet Take 1 tablet by mouth 2 times daily 180 tablet 3    levothyroxine (SYNTHROID) 137 MCG tablet Take 1 tablet by mouth Daily 90 tablet 1    furosemide (LASIX) 20 MG tablet Take 20 mg by mouth as needed      rivaroxaban (XARELTO) 15 MG TABS tablet Take 1 tablet by mouth daily (with breakfast) 90 tablet 3    amLODIPine (NORVASC) 10 MG tablet Take 10 mg by mouth daily      clopidogrel (PLAVIX) 75 MG tablet Take 75 mg by mouth daily      tiZANidine (ZANAFLEX) 4 MG tablet Take 4 mg by mouth every 6 hours as needed       nitroGLYCERIN (NITROSTAT) 0.4 MG SL tablet Place 1 tablet under the tongue every 5 minutes as needed for Chest pain. 25 tablet 1     No current facility-administered medications for this visit. Assessment and plan:     - Recurrent atrial flutter/tachcyardia    S/p DCCV on 8/2/17 by Dr. David Lu   Did not start amiodarone and was afraid of side effects. Discussed sotalol loading in hospital, but not interested in. Options including ablation discussed. Increase Metoprolol. If continues having palpation, ablation can be considered. - Will increase Metoprolol to 100 mg twice a day.          - Paroxysmal atrial fibrillation   - Hx of cardioversion in 2008   - s/p RFCA for A fib at [facility] by Dr. Milton Fraser in 2009   - ECG shows atrial paced rhythm. - High [id number] score and high risk for stroke and thromboembolism. Anticoagulation is recommended. - on Xarelto 15 mg daily. Creat Clearance 49.26 ml/min. - CAD   - Stable. On Plavix and metoprolol and rosuvastatin. - denies exertional chest pain, no change in SCHMID    -HTN   - Controlled. Blood pressure 128/80, pulse 74, height 5' 3\" (1.6 m), weight 146 lb 1.9 oz (66.3 kg), SpO2 96 %, not currently breastfeeding. Follow up in six months with device check same day. · Tobacco Cessation Counseling: N/A non-smoker   · Retake of BP if >140/90: N/A   · Documentation to PCP/referring for new patient: Note sent to PCP office visit  · CAD patient on anti-platelet:  Yes  · CAD patient on STATIN therapy: Yes   · Patient with atrial fibrillation on anticoagulation:  Yes Xarelto       Thank you for allowing me to participate in the care of Nixon Gomez. Further evaluation will be based upon the patient's clinical course and testing results. All questions and concerns were addressed to the patient/family. Alternatives to my treatment were discussed. I have discussed the above stated plan and the patient verbalized understanding and agreed with the plan.     Ria Morelos MD, MPH  [place] [address]   Office: [phone]

## 2017-11-15 ENCOUNTER — TELEPHONE (OUTPATIENT)
Dept: INTERNAL MEDICINE CLINIC | Age: 77
End: 2017-11-15

## 2017-11-15 RX ORDER — LEVOTHYROXINE SODIUM 137 UG/1
TABLET ORAL
Qty: 90 TABLET | Refills: 0 | Status: SHIPPED | OUTPATIENT
Start: 2017-11-15 | End: 2018-02-19 | Stop reason: SDUPTHER

## 2017-11-15 NOTE — TELEPHONE ENCOUNTER
Pt has decided she is not going to do the forteo shots. She is taking the calcium and everything else you told her to do.

## 2017-12-14 ENCOUNTER — TELEPHONE (OUTPATIENT)
Dept: FAMILY MEDICINE CLINIC | Age: 77
End: 2017-12-14

## 2017-12-14 RX ORDER — NITROGLYCERIN 400 UG/1
1 SPRAY ORAL EVERY 5 MIN PRN
Qty: 1 BOTTLE | Refills: 3 | Status: SHIPPED | OUTPATIENT
Start: 2017-12-14 | End: 2018-03-28 | Stop reason: SDUPTHER

## 2017-12-14 NOTE — TELEPHONE ENCOUNTER
nitroGLYCERIN (NITROSTAT    Pt is requesting the spray instead of the tablet    Please send to     [address] on [address]

## 2017-12-23 PROBLEM — R06.00 DYSPNEA: Status: ACTIVE | Noted: 2017-12-23

## 2017-12-23 PROBLEM — R06.02 SOB (SHORTNESS OF BREATH): Status: ACTIVE | Noted: 2017-12-23

## 2017-12-26 ENCOUNTER — CARE COORDINATION (OUTPATIENT)
Dept: CASE MANAGEMENT | Age: 77
End: 2017-12-26

## 2017-12-26 ENCOUNTER — TELEPHONE (OUTPATIENT)
Dept: CARDIOLOGY | Age: 77
End: 2017-12-26

## 2018-01-03 ENCOUNTER — CARE COORDINATION (OUTPATIENT)
Dept: CASE MANAGEMENT | Age: 78
End: 2018-01-03

## 2018-01-09 ENCOUNTER — OFFICE VISIT (OUTPATIENT)
Dept: CARDIOLOGY CLINIC | Age: 78
End: 2018-01-09

## 2018-01-09 VITALS
BODY MASS INDEX: 25.69 KG/M2 | DIASTOLIC BLOOD PRESSURE: 62 MMHG | SYSTOLIC BLOOD PRESSURE: 150 MMHG | HEART RATE: 64 BPM | HEIGHT: 63 IN | WEIGHT: 145 LBS

## 2018-01-09 DIAGNOSIS — I48.91 ATRIAL FIBRILLATION, UNSPECIFIED TYPE (HCC): Primary | ICD-10-CM

## 2018-01-09 DIAGNOSIS — I25.119 CORONARY ARTERY DISEASE INVOLVING NATIVE HEART WITH ANGINA PECTORIS, UNSPECIFIED VESSEL OR LESION TYPE (HCC): Chronic | ICD-10-CM

## 2018-01-09 DIAGNOSIS — I48.91 ATRIAL FIBRILLATION WITH RVR (HCC): ICD-10-CM

## 2018-01-09 PROCEDURE — G8427 DOCREV CUR MEDS BY ELIG CLIN: HCPCS | Performed by: NURSE PRACTITIONER

## 2018-01-09 PROCEDURE — 1090F PRES/ABSN URINE INCON ASSESS: CPT | Performed by: NURSE PRACTITIONER

## 2018-01-09 PROCEDURE — G8399 PT W/DXA RESULTS DOCUMENT: HCPCS | Performed by: NURSE PRACTITIONER

## 2018-01-09 PROCEDURE — 1036F TOBACCO NON-USER: CPT | Performed by: NURSE PRACTITIONER

## 2018-01-09 PROCEDURE — G8417 CALC BMI ABV UP PARAM F/U: HCPCS | Performed by: NURSE PRACTITIONER

## 2018-01-09 PROCEDURE — 1123F ACP DISCUSS/DSCN MKR DOCD: CPT | Performed by: NURSE PRACTITIONER

## 2018-01-09 PROCEDURE — 4040F PNEUMOC VAC/ADMIN/RCVD: CPT | Performed by: NURSE PRACTITIONER

## 2018-01-09 PROCEDURE — 93000 ELECTROCARDIOGRAM COMPLETE: CPT | Performed by: NURSE PRACTITIONER

## 2018-01-09 PROCEDURE — G8484 FLU IMMUNIZE NO ADMIN: HCPCS | Performed by: NURSE PRACTITIONER

## 2018-01-09 PROCEDURE — 99214 OFFICE O/P EST MOD 30 MIN: CPT | Performed by: NURSE PRACTITIONER

## 2018-01-09 PROCEDURE — G8598 ASA/ANTIPLAT THER USED: HCPCS | Performed by: NURSE PRACTITIONER

## 2018-01-09 RX ORDER — AMLODIPINE BESYLATE 10 MG/1
10 TABLET ORAL DAILY
Qty: 90 TABLET | Refills: 2 | Status: SHIPPED | OUTPATIENT
Start: 2018-01-09 | End: 2019-02-08 | Stop reason: SDUPTHER

## 2018-01-09 RX ORDER — CLOPIDOGREL BISULFATE 75 MG/1
75 TABLET ORAL DAILY
Qty: 90 TABLET | Refills: 2 | Status: ON HOLD | OUTPATIENT
Start: 2018-01-09 | End: 2018-12-03

## 2018-01-09 NOTE — PROGRESS NOTES
unspecified site, unspecified as acute or chronic, without mention of hemorrhage, perforation, or obstruction     Thyroid disease     Unspecified disorder of kidney and ureter     Unspecified hypothyroidism      Social History:    History   Smoking Status    Former Smoker    Packs/day: 1.00    Years: 28.00    Types: Cigarettes    Quit date: 1/1/1987   Smokeless Tobacco    Never Used     Comment: H.O.smoking at age 15 / smoked up to 1 p.p.d / quit 1987     Current Medications:  Current Outpatient Prescriptions   Medication Sig Dispense Refill    amLODIPine (NORVASC) 10 MG tablet Take 1 tablet by mouth daily 90 tablet 2    clopidogrel (PLAVIX) 75 MG tablet Take 1 tablet by mouth daily 90 tablet 2    nitroGLYCERIN (NITROLINGUAL) 0.4 MG/SPRAY 0.4 mg spray Place 1 spray under the tongue every 5 minutes as needed for Chest pain 1 Bottle 3    levothyroxine (SYNTHROID) 137 MCG tablet TAKE 1 TABLET DAILY 90 tablet 0    metoprolol tartrate (LOPRESSOR) 100 MG tablet Take 1 tablet by mouth 2 times daily 180 tablet 3    HYDROcodone-acetaminophen (NORCO) 5-325 MG per tablet Take 1-2 tablets by mouth every 4 hours as needed for Pain . Earliest Fill Date: 11/8/17 150 tablet 0    topiramate (TOPAMAX) 50 MG tablet Take 1 tablet by mouth 2 times daily (Patient taking differently: Take 100 mg by mouth 2 times daily ) 180 tablet 1    allopurinol (ZYLOPRIM) 300 MG tablet Take 1 tablet by mouth daily 90 tablet 1    vitamin D (ERGOCALCIFEROL) 83377 units CAPS capsule Take 1 capsule by mouth once a week 12 capsule 1    furosemide (LASIX) 20 MG tablet Take 20 mg by mouth as needed      rivaroxaban (XARELTO) 15 MG TABS tablet Take 1 tablet by mouth daily (with breakfast) 90 tablet 3    tiZANidine (ZANAFLEX) 4 MG tablet Take 4 mg by mouth every 6 hours as needed       allopurinol (ZYLOPRIM) 100 MG tablet Take 1 tablet by mouth daily 90 tablet 1     No current facility-administered medications for this visit.       REVIEW CREATININE 1.0 12/24/2017    BUN 29 (H) 12/24/2017     12/24/2017    K 4.1 12/24/2017     12/24/2017    CO2 19 (L) 12/24/2017     Lab Results   Component Value Date    TSH 1.27 04/27/2016    W0OFQOC 8.7 07/05/2012     Lab Results   Component Value Date    WBC 10.3 12/23/2017    HGB 15.1 12/24/2017    HCT 46.5 12/24/2017    MCV 78.0 (L) 12/23/2017     12/23/2017     No components found for: CHLPL  Lab Results   Component Value Date    TRIG 240 (H) 06/02/2016    TRIG 237 (H) 10/02/2015    TRIG 286 (H) 10/19/2012     Lab Results   Component Value Date    HDL 30 (L) 06/02/2016    HDL 26 (L) 10/02/2015    HDL 34 (L) 10/19/2012     Lab Results   Component Value Date    LDLCALC 75 06/02/2016    LDLCALC 47 10/02/2015    LDLCALC 55 10/19/2012     Lab Results   Component Value Date    LABVLDL 48 06/02/2016    LABVLDL 47 10/02/2015    LABVLDL 57 10/19/2012       Assessment:     Problem: Coronary artery disease  Remote CABG  7/2008 Cath: Patent LIMA to LAD, occluded SVG to RCA with collaterals. Debbi Jeans will continue with her current regime including diet and exercise. .   2/3/17 Cath: which showed 100% prox RCA with bridging collateral, 100% SVG to RCA, 100% prox LAD after D1, D1 stent widely patent, Mild Cx disease, Normal LV FXN--EF 60%--EDP 12 post hydration.               Stable anatomy  Patient denies any chest pain. On Ranexa, Plavix,  Norvasc, and Lopressor      Problem: Hypertension: Stable.   Vitals:    01/09/18 0831   BP: (!) 150/62   Pulse:      Continue current medications: Lopressor, Lasix, and Norvasc     Problem: Atrial fibrillation/ Atrial Flutter  Currently in SR.   - Paroxysmal atrial flutter                        - Hx of cardioversion in 2008                        -s/o RFCA for A fib at [facility] by Dr. Magana Sites in 2009                         - S/p DCCV on 8/2/17 by Dr. Mandy Burger                        - Currently in  paced rhythm-        Problem: Hyperlipidemia    10/16: Total 153 Triglycerides 240, HDL 30, LDL 75   Lab slip given for Lipid panel, AST, and ALT- patient has not being taking her Crestor for nine months. Followed per PCP     PFT showed severe obstructive disease,    Followed per DR. Lee and has FU with him        Plan:   NO change in meds  Follow up in three months with [facility]          The patient  Currently is not smoking. The risks related to smoking were reviewed with the patient. Recommend maintaining a smoke-free lifestyle. Products available for smoking cessation were discussed. Thank you for allowing to us to participate in the care of Patricia Leiva CNP

## 2018-01-09 NOTE — LETTER
 Chronic kidney disease     Congestive heart failure, unspecified     Degeneration of cervical intervertebral disc     Essential and other specified forms of tremor     Gout     HIGH CHOLESTEROL     History of CVA (cerebrovascular accident)     Hypertension     Influenza 12/23/2017    Mitral valve stenosis and aortic valve insufficiency     Movement disorder     back problems    Peptic ulcer, unspecified site, unspecified as acute or chronic, without mention of hemorrhage, perforation, or obstruction     Thyroid disease     Unspecified disorder of kidney and ureter     Unspecified hypothyroidism      Social History:    History   Smoking Status    Former Smoker    Packs/day: 1.00    Years: 28.00    Types: Cigarettes    Quit date: 1/1/1987   Smokeless Tobacco    Never Used     Comment: H.O.smoking at age 15 / smoked up to 1 p.p.d / quit 1987     Current Medications:  Current Outpatient Prescriptions   Medication Sig Dispense Refill    amLODIPine (NORVASC) 10 MG tablet Take 1 tablet by mouth daily 90 tablet 2    clopidogrel (PLAVIX) 75 MG tablet Take 1 tablet by mouth daily 90 tablet 2    nitroGLYCERIN (NITROLINGUAL) 0.4 MG/SPRAY 0.4 mg spray Place 1 spray under the tongue every 5 minutes as needed for Chest pain 1 Bottle 3    levothyroxine (SYNTHROID) 137 MCG tablet TAKE 1 TABLET DAILY 90 tablet 0    metoprolol tartrate (LOPRESSOR) 100 MG tablet Take 1 tablet by mouth 2 times daily 180 tablet 3    HYDROcodone-acetaminophen (NORCO) 5-325 MG per tablet Take 1-2 tablets by mouth every 4 hours as needed for Pain .  Earliest Fill Date: 11/8/17 150 tablet 0    topiramate (TOPAMAX) 50 MG tablet Take 1 tablet by mouth 2 times daily (Patient taking differently: Take 100 mg by mouth 2 times daily ) 180 tablet 1    allopurinol (ZYLOPRIM) 300 MG tablet Take 1 tablet by mouth daily 90 tablet 1    vitamin D (ERGOCALCIFEROL) 78811 units CAPS capsule Take 1 capsule by mouth once a week 12 capsule 1  furosemide (LASIX) 20 MG tablet Take 20 mg by mouth as needed      rivaroxaban (XARELTO) 15 MG TABS tablet Take 1 tablet by mouth daily (with breakfast) 90 tablet 3    tiZANidine (ZANAFLEX) 4 MG tablet Take 4 mg by mouth every 6 hours as needed       allopurinol (ZYLOPRIM) 100 MG tablet Take 1 tablet by mouth daily 90 tablet 1     No current facility-administered medications for this visit. REVIEW OF SYSTEMS:   CONSTITUTIONAL: No major weight gain or loss, fatigue, weakness, night sweats or fever. There's been no change in energy level, sleep pattern, or activity level. HEENT: No new vision difficulties or ringing in the ears. RESPIRATORY: No new SOB, PND, orthopnea or cough. CARDIOVASCULAR: See HPI  GI: No nausea, vomiting, diarrhea, constipation, abdominal pain or changes in bowel habits. : No urinary frequency, urgency, incontinence hematuria or dysuria. SKIN: No cyanosis or skin lesions. MUSCULOSKELETAL: No new muscle or joint pain. NEUROLOGICAL: No syncope or TIA-like symptoms. PSYCHIATRIC: No anxiety, pain, insomnia or depression    Objective:   PHYSICAL EXAM:        VITALS:    Vitals:    01/09/18 0831   BP: (!) 150/62   Pulse:          CONSTITUTIONAL: Cooperative, no apparent distress, and appears well nourished / developed  NEUROLOGIC:  Awake and orientated to person, place and time. PSYCH: Calm affect. SKIN: Warm and dry. HEENT: Sclera non-icteric, normocephalic, neck supple, no elevation of JVP, normal carotid pulses with no bruits and thyroid normal size. LUNGS:  No increased work of breathing and clear to auscultation, no crackles or wheezing. CARDIOVASCULAR:  Regular rate and irregular rhythm with no murmurs, gallops, rubs, or abnormal heart sounds, normal PMI. The apical impulses not displaced.   Heart tones are crisp and normal. Cervical veins are not engorged       EKG today paced            JVP less than 8 cm H2O

## 2018-01-10 ENCOUNTER — OFFICE VISIT (OUTPATIENT)
Dept: FAMILY MEDICINE CLINIC | Age: 78
End: 2018-01-10

## 2018-01-10 VITALS
SYSTOLIC BLOOD PRESSURE: 140 MMHG | BODY MASS INDEX: 25.93 KG/M2 | WEIGHT: 146.38 LBS | RESPIRATION RATE: 12 BRPM | OXYGEN SATURATION: 97 % | HEART RATE: 70 BPM | DIASTOLIC BLOOD PRESSURE: 70 MMHG

## 2018-01-10 DIAGNOSIS — K57.32 DIVERTICULITIS OF LARGE INTESTINE WITHOUT PERFORATION OR ABSCESS WITHOUT BLEEDING: ICD-10-CM

## 2018-01-10 DIAGNOSIS — R25.1 TREMOR: ICD-10-CM

## 2018-01-10 DIAGNOSIS — I48.91 ATRIAL FIBRILLATION WITH RVR (HCC): ICD-10-CM

## 2018-01-10 DIAGNOSIS — J18.0 BRONCHOPNEUMONIA: Primary | ICD-10-CM

## 2018-01-10 DIAGNOSIS — J10.1 INFLUENZA A: ICD-10-CM

## 2018-01-10 DIAGNOSIS — R77.8 ELEVATED TROPONIN: ICD-10-CM

## 2018-01-10 PROBLEM — I47.19 ATRIAL TACHYCARDIA: Status: RESOLVED | Noted: 2017-08-08 | Resolved: 2018-01-10

## 2018-01-10 PROBLEM — I47.1 ATRIAL TACHYCARDIA (HCC): Status: RESOLVED | Noted: 2017-08-08 | Resolved: 2018-01-10

## 2018-01-10 PROCEDURE — G8427 DOCREV CUR MEDS BY ELIG CLIN: HCPCS | Performed by: FAMILY MEDICINE

## 2018-01-10 PROCEDURE — 1123F ACP DISCUSS/DSCN MKR DOCD: CPT | Performed by: FAMILY MEDICINE

## 2018-01-10 PROCEDURE — 1090F PRES/ABSN URINE INCON ASSESS: CPT | Performed by: FAMILY MEDICINE

## 2018-01-10 PROCEDURE — G8484 FLU IMMUNIZE NO ADMIN: HCPCS | Performed by: FAMILY MEDICINE

## 2018-01-10 PROCEDURE — 99214 OFFICE O/P EST MOD 30 MIN: CPT | Performed by: FAMILY MEDICINE

## 2018-01-10 PROCEDURE — G8417 CALC BMI ABV UP PARAM F/U: HCPCS | Performed by: FAMILY MEDICINE

## 2018-01-10 PROCEDURE — G8399 PT W/DXA RESULTS DOCUMENT: HCPCS | Performed by: FAMILY MEDICINE

## 2018-01-10 PROCEDURE — G8598 ASA/ANTIPLAT THER USED: HCPCS | Performed by: FAMILY MEDICINE

## 2018-01-10 PROCEDURE — 1036F TOBACCO NON-USER: CPT | Performed by: FAMILY MEDICINE

## 2018-01-10 PROCEDURE — 4040F PNEUMOC VAC/ADMIN/RCVD: CPT | Performed by: FAMILY MEDICINE

## 2018-01-10 RX ORDER — METRONIDAZOLE 500 MG/1
500 TABLET ORAL 3 TIMES DAILY
Qty: 21 TABLET | Refills: 0 | Status: SHIPPED | OUTPATIENT
Start: 2018-01-10 | End: 2018-01-17

## 2018-01-10 RX ORDER — TOPIRAMATE 100 MG/1
100 TABLET, FILM COATED ORAL 2 TIMES DAILY
Qty: 180 TABLET | Refills: 3 | Status: SHIPPED | OUTPATIENT
Start: 2018-01-10 | End: 2019-01-18 | Stop reason: SDUPTHER

## 2018-01-10 RX ORDER — CIPROFLOXACIN 500 MG/1
500 TABLET, FILM COATED ORAL 2 TIMES DAILY
Qty: 14 TABLET | Refills: 0 | Status: SHIPPED | OUTPATIENT
Start: 2018-01-10 | End: 2018-01-17

## 2018-01-10 NOTE — PROGRESS NOTES
Subjective:      Patient ID: Salvador Bautista is a 68 y.o. female. HPI Pt states she is here for a hospital follow up. Pt was Admitted to [facility] December 23 through December 25. Patient was hospitalized with influenza and right lower lobe pneumonia. She also went to atrial fibrillation with rapid ventricular response and required cardiology assistance. Patient states that she left the hospital now she is in normal sinus rhythm and feels better. Her respiratory symptoms have resolved. Patient also had elevated troponins which is probably secondary to atrial fibrillation and congestive heart failure. Patient since hospitalization was reevaluated by cardiology and continue with current treatment plan. Pt states now she states there is another problem going on. For a week now she has this severe pain on and off on her abdominal area(left side), pt states she the to touch is painful. Patient denies any change of bowel or bladder. She denies food making a difference as far as symptom control. Patient does feel her tremors works better controlled taking Topamax 100 mg twice a day and she will like to restart that dosage.     Review of Systems  Patient Active Problem List   Diagnosis    Paroxysmal atrial fibrillation ([address]    Coronary artery disease involving native heart with angina pectoris ([address]    Essential hypertension, benign    Mixed hyperlipidemia    Chronic gouty arthropathy    Hypertension    Acquired hypothyroidism    Arthritis    Heart valve problem    Restrictive lung disease    Sick sinus syndrome ([address]    Allergic rhinitis    CRF (chronic renal failure)    Fibrocystic breast    Cerebrovascular accident (CVA) ([address]    Pacemaker    Typical atrial flutter ([address]    Atrial tachycardia ([address]    Primary osteoarthritis involving multiple joints    Vitamin D deficiency    Tremor    Dyspnea    Atrial fibrillation with RVR (HCC)    Elevated troponin    Bronchopneumonia    Influenza A       Outpatient Prescriptions Marked as Taking for the 1/10/18 encounter (Office Visit) with Silvestre Andrea MD   Medication Sig Dispense Refill    amLODIPine (NORVASC) 10 MG tablet Take 1 tablet by mouth daily 90 tablet 2    clopidogrel (PLAVIX) 75 MG tablet Take 1 tablet by mouth daily 90 tablet 2    nitroGLYCERIN (NITROLINGUAL) 0.4 MG/SPRAY 0.4 mg spray Place 1 spray under the tongue every 5 minutes as needed for Chest pain 1 Bottle 3    levothyroxine (SYNTHROID) 137 MCG tablet TAKE 1 TABLET DAILY 90 tablet 0    metoprolol tartrate (LOPRESSOR) 100 MG tablet Take 1 tablet by mouth 2 times daily 180 tablet 3    HYDROcodone-acetaminophen (NORCO) 5-325 MG per tablet Take 1-2 tablets by mouth every 4 hours as needed for Pain .  Earliest Fill Date: 11/8/17 150 tablet 0    topiramate (TOPAMAX) 50 MG tablet Take 1 tablet by mouth 2 times daily (Patient taking differently: Take 100 mg by mouth 2 times daily ) 180 tablet 1    allopurinol (ZYLOPRIM) 300 MG tablet Take 1 tablet by mouth daily 90 tablet 1    allopurinol (ZYLOPRIM) 100 MG tablet Take 1 tablet by mouth daily 90 tablet 1    vitamin D (ERGOCALCIFEROL) 82069 units CAPS capsule Take 1 capsule by mouth once a week 12 capsule 1    furosemide (LASIX) 20 MG tablet Take 20 mg by mouth as needed      rivaroxaban (XARELTO) 15 MG TABS tablet Take 1 tablet by mouth daily (with breakfast) 90 tablet 3    tiZANidine (ZANAFLEX) 4 MG tablet Take 4 mg by mouth every 6 hours as needed          Allergies   Allergen Reactions    Aspirin Nausea Only    Ativan [Lorazepam] Other (See Comments)     hallucinations    Diltiazem Anaphylaxis    Diltiazem Hcl Anaphylaxis    Dabigatran Nausea Only     And indigestion    Dabigatran Etexilate Mesylate Other (See Comments)     indigestion    Dabigatran Etexilate Mesylate Nausea Only     And indigestion    Iodides Other (See Comments)     Kidney disease    Mysoline [Primidone]     Nsaids

## 2018-01-11 ENCOUNTER — CARE COORDINATION (OUTPATIENT)
Dept: CASE MANAGEMENT | Age: 78
End: 2018-01-11

## 2018-01-11 DIAGNOSIS — I10 ESSENTIAL HYPERTENSION, BENIGN: Primary | Chronic | ICD-10-CM

## 2018-01-11 PROCEDURE — 1111F DSCHRG MED/CURRENT MED MERGE: CPT | Performed by: FAMILY MEDICINE

## 2018-01-11 NOTE — CARE COORDINATION
[facility] Transitions Follow Up Call    2018    Patient: Raysa Paulson  Patient : 1940   MRN: [id number]  Reason for Admission: pneumonia  Discharge Date: 17 RARS: Risk Score: 13.5       Spoke with: Emily Coleman [facility] Transitions Subsequent and Final Call    Subsequent and Final Calls  Do you have any ongoing symptoms?:  No  Have your medications changed?:  Yes  Patient Reports:  flagyl and cipro; topamax changed back to original dose  Do you have any questions related to your medications?:  No  Do you currently have any active services?:  No  Do you have any needs or concerns that I can assist you with?:  No  Identified Barriers:  None  Care Transitions Interventions  No Identified Needs  Other Interventions:          Pt states doing well, saw PCP yesterday, taking 2 antibiotics for diverticulitis, changed topamax to original dose.  No need for further CTC f/u calls      Follow Up  Future Appointments  Date Time Provider Triston Ford   2018 3:10 PM Roman Rivas MD    2018 9:00 AM SCHEDULE, [facility] PHONE TRANSMISSION  [facility]   2018 8:15 AM Anna Tejada MD  [facility]   5/15/2018 9:00 AM SCHEDULE, SAVANNA JORDAN  [facility]   5/15/2018 9:00 AM Pete Ramirez MD  [facility]   2018 9:15 AM Bianca Thrasher DO  [facility]       Julianne Lisa RN

## 2018-02-01 ENCOUNTER — TELEPHONE (OUTPATIENT)
Dept: PHARMACY | Facility: CLINIC | Age: 78
End: 2018-02-01

## 2018-02-07 DIAGNOSIS — M15.9 PRIMARY OSTEOARTHRITIS INVOLVING MULTIPLE JOINTS: ICD-10-CM

## 2018-02-07 RX ORDER — HYDROCODONE BITARTRATE AND ACETAMINOPHEN 5; 325 MG/1; MG/1
1-2 TABLET ORAL EVERY 4 HOURS PRN
Qty: 150 TABLET | Refills: 0 | Status: SHIPPED | OUTPATIENT
Start: 2018-02-07 | End: 2018-06-21 | Stop reason: SDUPTHER

## 2018-02-15 ENCOUNTER — TELEPHONE (OUTPATIENT)
Dept: FAMILY MEDICINE CLINIC | Age: 78
End: 2018-02-15

## 2018-02-15 NOTE — TELEPHONE ENCOUNTER
Pt was in the hospital 12/23 with flu A - was recently exposed to Flu A again yesterday and today - should she be taking tamiflu?     Please call and advise

## 2018-02-19 RX ORDER — LEVOTHYROXINE SODIUM 137 UG/1
TABLET ORAL
Qty: 90 TABLET | Refills: 0 | Status: SHIPPED | OUTPATIENT
Start: 2018-02-19 | End: 2018-05-31 | Stop reason: SDUPTHER

## 2018-02-26 ENCOUNTER — TELEPHONE (OUTPATIENT)
Dept: CARDIOLOGY CLINIC | Age: 78
End: 2018-02-26

## 2018-02-26 ENCOUNTER — TELEPHONE (OUTPATIENT)
Dept: FAMILY MEDICINE CLINIC | Age: 78
End: 2018-02-26

## 2018-02-27 ENCOUNTER — NURSE ONLY (OUTPATIENT)
Dept: CARDIOLOGY CLINIC | Age: 78
End: 2018-02-27

## 2018-02-27 DIAGNOSIS — Z95.0 PACEMAKER: Primary | ICD-10-CM

## 2018-02-27 DIAGNOSIS — I49.5 SICK SINUS SYNDROME (HCC): ICD-10-CM

## 2018-02-27 PROCEDURE — 93296 REM INTERROG EVL PM/IDS: CPT | Performed by: INTERNAL MEDICINE

## 2018-02-27 PROCEDURE — 93294 REM INTERROG EVL PM/LDLS PM: CPT | Performed by: INTERNAL MEDICINE

## 2018-02-27 NOTE — LETTER
[address] [facility] [phone]  [address] [facility] [phone]    Pacemaker/Defibrillator Clinic          02/28/18        Terrell Salcedo  [address] [facility] [address]        Dear Terrell Salcedo    This letter is to inform you that we received the transmission from your monitor at home that checks your pacemaker and/or defibrillator, or implanted heart monitor. The next date your monitor will automatically transmit will be 8-21-18. Please do not send additional routine transmissions unless specifically requested. Your device and monitor are wireless and most transmit cellularly, but please periodically check your monitor is still plugged in to the electrical outlet. If you still use the telephone land line to send please ensure the connection to the phone bret is secure. This will help to ensure successful automatic transmissions in the future. Also, the monitor needs to be close to you while sleeping at night. Please be aware that the remote device transmission sites are periodically monitored only during regular business hours during which simultaneous in-office device clinics are being run. If your transmission requires attention, we will contact you as soon as possible. Thank you.             Nicolas [address]

## 2018-03-14 ENCOUNTER — OFFICE VISIT (OUTPATIENT)
Dept: FAMILY MEDICINE CLINIC | Age: 78
End: 2018-03-14

## 2018-03-14 VITALS
DIASTOLIC BLOOD PRESSURE: 72 MMHG | OXYGEN SATURATION: 96 % | SYSTOLIC BLOOD PRESSURE: 114 MMHG | HEART RATE: 70 BPM | BODY MASS INDEX: 25.42 KG/M2 | WEIGHT: 143.5 LBS | RESPIRATION RATE: 12 BRPM

## 2018-03-14 DIAGNOSIS — R19.7 DIARRHEA, UNSPECIFIED TYPE: ICD-10-CM

## 2018-03-14 DIAGNOSIS — M15.9 PRIMARY OSTEOARTHRITIS INVOLVING MULTIPLE JOINTS: ICD-10-CM

## 2018-03-14 DIAGNOSIS — I80.02 SUPERFICIAL PHLEBITIS OF LEG, LEFT: ICD-10-CM

## 2018-03-14 DIAGNOSIS — I10 ESSENTIAL HYPERTENSION: ICD-10-CM

## 2018-03-14 DIAGNOSIS — I25.119 CORONARY ARTERY DISEASE INVOLVING NATIVE HEART WITH ANGINA PECTORIS, UNSPECIFIED VESSEL OR LESION TYPE (HCC): Chronic | ICD-10-CM

## 2018-03-14 DIAGNOSIS — R07.9 CHEST PAIN, UNSPECIFIED TYPE: Primary | ICD-10-CM

## 2018-03-14 PROBLEM — N18.9 CRF (CHRONIC RENAL FAILURE): Status: RESOLVED | Noted: 2017-03-03 | Resolved: 2018-03-14

## 2018-03-14 PROCEDURE — G8598 ASA/ANTIPLAT THER USED: HCPCS | Performed by: FAMILY MEDICINE

## 2018-03-14 PROCEDURE — 99214 OFFICE O/P EST MOD 30 MIN: CPT | Performed by: FAMILY MEDICINE

## 2018-03-14 PROCEDURE — G8399 PT W/DXA RESULTS DOCUMENT: HCPCS | Performed by: FAMILY MEDICINE

## 2018-03-14 PROCEDURE — 4040F PNEUMOC VAC/ADMIN/RCVD: CPT | Performed by: FAMILY MEDICINE

## 2018-03-14 PROCEDURE — G8482 FLU IMMUNIZE ORDER/ADMIN: HCPCS | Performed by: FAMILY MEDICINE

## 2018-03-14 PROCEDURE — 1090F PRES/ABSN URINE INCON ASSESS: CPT | Performed by: FAMILY MEDICINE

## 2018-03-14 PROCEDURE — 1036F TOBACCO NON-USER: CPT | Performed by: FAMILY MEDICINE

## 2018-03-14 PROCEDURE — G8427 DOCREV CUR MEDS BY ELIG CLIN: HCPCS | Performed by: FAMILY MEDICINE

## 2018-03-14 PROCEDURE — G8417 CALC BMI ABV UP PARAM F/U: HCPCS | Performed by: FAMILY MEDICINE

## 2018-03-14 PROCEDURE — 1123F ACP DISCUSS/DSCN MKR DOCD: CPT | Performed by: FAMILY MEDICINE

## 2018-03-14 PROCEDURE — 93000 ELECTROCARDIOGRAM COMPLETE: CPT | Performed by: FAMILY MEDICINE

## 2018-03-14 RX ORDER — METRONIDAZOLE 500 MG/1
500 TABLET ORAL 3 TIMES DAILY
Qty: 30 TABLET | Refills: 0 | Status: SHIPPED | OUTPATIENT
Start: 2018-03-14 | End: 2018-03-15

## 2018-03-14 ASSESSMENT — PATIENT HEALTH QUESTIONNAIRE - PHQ9
1. LITTLE INTEREST OR PLEASURE IN DOING THINGS: 0
SUM OF ALL RESPONSES TO PHQ9 QUESTIONS 1 & 2: 0
SUM OF ALL RESPONSES TO PHQ QUESTIONS 1-9: 0
2. FEELING DOWN, DEPRESSED OR HOPELESS: 0

## 2018-03-15 ENCOUNTER — TELEPHONE (OUTPATIENT)
Dept: FAMILY MEDICINE CLINIC | Age: 78
End: 2018-03-15

## 2018-03-15 ENCOUNTER — OFFICE VISIT (OUTPATIENT)
Dept: CARDIOLOGY CLINIC | Age: 78
End: 2018-03-15

## 2018-03-15 VITALS
BODY MASS INDEX: 25.51 KG/M2 | DIASTOLIC BLOOD PRESSURE: 62 MMHG | WEIGHT: 144 LBS | SYSTOLIC BLOOD PRESSURE: 142 MMHG | HEART RATE: 60 BPM | OXYGEN SATURATION: 97 %

## 2018-03-15 DIAGNOSIS — E78.2 MIXED HYPERLIPIDEMIA: Chronic | ICD-10-CM

## 2018-03-15 DIAGNOSIS — I10 ESSENTIAL HYPERTENSION, BENIGN: Chronic | ICD-10-CM

## 2018-03-15 DIAGNOSIS — I25.119 CORONARY ARTERY DISEASE INVOLVING NATIVE HEART WITH ANGINA PECTORIS, UNSPECIFIED VESSEL OR LESION TYPE (HCC): Primary | Chronic | ICD-10-CM

## 2018-03-15 DIAGNOSIS — I48.0 PAROXYSMAL ATRIAL FIBRILLATION (HCC): Chronic | ICD-10-CM

## 2018-03-15 PROCEDURE — 99214 OFFICE O/P EST MOD 30 MIN: CPT | Performed by: NURSE PRACTITIONER

## 2018-03-15 PROCEDURE — 1090F PRES/ABSN URINE INCON ASSESS: CPT | Performed by: NURSE PRACTITIONER

## 2018-03-15 PROCEDURE — 4040F PNEUMOC VAC/ADMIN/RCVD: CPT | Performed by: NURSE PRACTITIONER

## 2018-03-15 PROCEDURE — G8482 FLU IMMUNIZE ORDER/ADMIN: HCPCS | Performed by: NURSE PRACTITIONER

## 2018-03-15 PROCEDURE — 1036F TOBACCO NON-USER: CPT | Performed by: NURSE PRACTITIONER

## 2018-03-15 PROCEDURE — 1123F ACP DISCUSS/DSCN MKR DOCD: CPT | Performed by: NURSE PRACTITIONER

## 2018-03-15 PROCEDURE — G8399 PT W/DXA RESULTS DOCUMENT: HCPCS | Performed by: NURSE PRACTITIONER

## 2018-03-15 PROCEDURE — G8427 DOCREV CUR MEDS BY ELIG CLIN: HCPCS | Performed by: NURSE PRACTITIONER

## 2018-03-15 PROCEDURE — G8598 ASA/ANTIPLAT THER USED: HCPCS | Performed by: NURSE PRACTITIONER

## 2018-03-15 PROCEDURE — G8417 CALC BMI ABV UP PARAM F/U: HCPCS | Performed by: NURSE PRACTITIONER

## 2018-03-15 NOTE — PROGRESS NOTES
rivaroxaban (XARELTO) 15 MG TABS tablet Take 1 tablet by mouth daily (with breakfast) 90 tablet 3    tiZANidine (ZANAFLEX) 4 MG tablet Take 4 mg by mouth every 6 hours as needed        No current facility-administered medications for this visit. REVIEW OF SYSTEMS:   CONSTITUTIONAL: No major weight gain or loss, fatigue, weakness, night sweats or fever. There's been no change in energy level, sleep pattern, or activity level. HEENT: No new vision difficulties or ringing in the ears. RESPIRATORY: No new SOB, PND, orthopnea or cough. CARDIOVASCULAR: See HPI  GI: No nausea, vomiting, diarrhea, constipation, abdominal pain or changes in bowel habits. : No urinary frequency, urgency, incontinence hematuria or dysuria. SKIN: No cyanosis or skin lesions. MUSCULOSKELETAL: No new muscle or joint pain. NEUROLOGICAL: No syncope or TIA-like symptoms. PSYCHIATRIC: No anxiety, pain, insomnia or depression    Objective:   PHYSICAL EXAM:        VITALS:    Vitals:    03/15/18 1329   BP: (!) 142/62   Pulse:    SpO2:          CONSTITUTIONAL: Cooperative, no apparent distress, and appears well nourished / developed  NEUROLOGIC:  Awake and orientated to person, place and time. PSYCH: Calm affect. SKIN: Warm and dry. HEENT: Sclera non-icteric, normocephalic, neck supple, no elevation of JVP, normal carotid pulses with no bruits and thyroid normal size. LUNGS:  No increased work of breathing and clear to auscultation, no crackles or wheezing. CARDIOVASCULAR:  Regular rate and irregular rhythm with no murmurs, gallops, rubs, or abnormal heart sounds, normal PMI. The apical impulses not displaced.   Heart tones are crisp and normal. Cervical veins are not engorged                 JVP less than 8 cm H2O                                                                              The carotid upstroke is normal in amplitude and contour without delay or bruit    ABDOMEN:  Normal bowel sounds, non-distended and non-tender

## 2018-03-15 NOTE — TELEPHONE ENCOUNTER
Called Cardiology office to get patient in due to having chest pains. She is a known heart disease patient but is stable right now. Patient is scheduled at 1:45pm today with Rosenda Awan at the [facility] office. Patient advised of information.

## 2018-03-19 LAB
A/G RATIO: 0.9 (ref 1–2)
ALBUMIN SERPL-MCNC: 6.9 G/DL (ref 6.4–8.2)
ALBUMIN SERUM: 3.3 G/DL (ref 3.4–5)
ALP BLD-CCNC: 95 U/L (ref 45–117)
ALT SERPL-CCNC: 18 U/L (ref 12–78)
ANION GAP SERPL CALCULATED.3IONS-SCNC: 7 MMOL/L (ref 7–16)
AST SERPL-CCNC: 17 U/L (ref 15–37)
BILIRUBIN: 0.5 MG/DL (ref 0.2–1)
BUN BLDV-MCNC: 19 MG/DL (ref 7–18)
CALCIUM SERPL-MCNC: 8.4 MG/DL (ref 8.5–10.1)
CHLORIDE BLD-SCNC: 112 MMOL/L (ref 98–107)
CHOLESTEROL, STONE: 162 MG/DL
CO2: 24 MMOL/L (ref 21–32)
CREATININE + EGFR PANEL: 1.2 MG/DL (ref 0.6–1.3)
GFR AFRICAN AMERICAN: 53 ML/MIN/1.73M2
GFR NON-AFRICAN AMERICAN: 44 ML/MIN/1.73M2
GLOBULIN: 3.6 G/DL (ref 2.6–4.2)
GLUCOSE: 84 MG/DL (ref 74–106)
HCT VFR BLD CALC: 45.9 % (ref 35.8–46.5)
HDLC SERPL-MCNC: 28 MG/DL (ref 40–60)
HEMOGLOBIN: 14.7 G/DL (ref 12.1–15.8)
LDL CHOLESTEROL CALCULATED: 89 MG/DL (ref 0–99)
MCH RBC QN AUTO: 25.5 PG (ref 28.4–33.4)
MCHC RBC AUTO-ENTMCNC: 32 G/DL (ref 31.1–37)
MCV RBC AUTO: 79.5 FL (ref 85–99)
PDW BLD-RTO: 18.2 % (ref 11.7–15.2)
PLATELET # BLD: 374 10*3/UL (ref 154–393)
POTASSIUM SERPL-SCNC: 3.8 MMOL/L (ref 3.5–5.1)
RBC # BLD: 5.78 10*6/UL (ref 3.86–5.17)
SODIUM BLD-SCNC: 143 MMOL/L (ref 136–145)
TRIGL SERPL-MCNC: 227 MG/DL (ref 0–149)
VLDLC SERPL CALC-MCNC: 45 MG/DL (ref 0–40)
WBC # BLD: 11.3 10*3/UL (ref 4–10.5)

## 2018-03-22 PROBLEM — I20.89 ANGINA AT REST: Status: ACTIVE | Noted: 2018-03-22

## 2018-03-22 PROBLEM — R07.9 CHEST PAIN: Status: ACTIVE | Noted: 2018-03-22

## 2018-03-22 PROBLEM — I20.8 ANGINA AT REST (HCC): Status: ACTIVE | Noted: 2018-03-22

## 2018-03-23 ENCOUNTER — CARE COORDINATION (OUTPATIENT)
Dept: CASE MANAGEMENT | Age: 78
End: 2018-03-23

## 2018-03-23 DIAGNOSIS — R07.89 CHEST DISCOMFORT: Primary | ICD-10-CM

## 2018-03-23 PROCEDURE — 1111F DSCHRG MED/CURRENT MED MERGE: CPT | Performed by: FAMILY MEDICINE

## 2018-03-28 RX ORDER — NITROGLYCERIN 400 UG/1
1 SPRAY ORAL EVERY 5 MIN PRN
Qty: 1 BOTTLE | Refills: 3 | Status: SHIPPED | OUTPATIENT
Start: 2018-03-28 | End: 2018-03-29 | Stop reason: SDUPTHER

## 2018-03-28 NOTE — TELEPHONE ENCOUNTER
Medication Refill    When was your last appointment with cardiology?03/15/18  (if 1year or longer, please schedule an appointment)    Medication needing refilled:nitroGLYCERIN (NITROLINGUAL) 0.4 MG/SPRAY 0.4 mg spray    Doseage of the medication:0.4 mg/spray    How are you taking this medication (QD, BID, TID, QID, PRN):Place 1 spray under the tongue every 5 minutes as needed for Chest pain    Patient want a 30 or 90 day supply called in:    Which Pharmacy are we sending the medication to:[facility] [address] [place] [address] [phone] - F [phone]    Pharmacy Phone number:    Pharmacy Fax number:

## 2018-03-29 ENCOUNTER — TELEPHONE (OUTPATIENT)
Dept: CARDIOLOGY CLINIC | Age: 78
End: 2018-03-29

## 2018-03-29 RX ORDER — NITROGLYCERIN 400 UG/1
1 SPRAY ORAL EVERY 5 MIN PRN
Qty: 1 BOTTLE | Refills: 3 | Status: SHIPPED | OUTPATIENT
Start: 2018-03-29 | End: 2019-09-27 | Stop reason: SDUPTHER

## 2018-03-29 NOTE — TELEPHONE ENCOUNTER
Assessment:      Problem: Coronary artery disease  Remote CABG  7/2008 Cath: Patent LIMA to LAD, occluded SVG to RCA with collaterals. Homer Germain will continue with her current regime including diet and exercise. .   2/3/17 Cath: which showed 100% prox RCA with bridging collateral, 100% SVG to RCA, 100% prox LAD after D1, D1 stent widely patent, Mild Cx disease, Normal LV FXN--EF 60%--EDP 12 post hydration.               Stable anatomy  On Ranexa, Plavix, Norvasc, and Lopressor    Plan: schedule follow up visit with Dr. Freddy Telles to discuss    Problem: Hypertension: Stable. Vitals:     03/15/18 1329   BP: (!) 142/62   Pulse:     SpO2:        Continue current medications: Lopressor, Lasix, and Norvasc     Problem: Atrial fibrillation/ Atrial Flutter   - Paroxysmal atrial flutter                        - Hx of cardioversion in 2008                        -s/o RFCA for A fib at [facility] by Dr. Con Ram in 2009                         - S/p DCCV on 8/2/17 by Dr. Sabas Coronado- Currently in regular rhythm       Currently on Xarelto 15 mg daily due to creat clearance 35     Problem: Hyperlipidemia    10/16: Total 153 Triglycerides 240, HDL 30, LDL 75   patient has not being taking her Crestor for the last year  Followed per PCP- Patient had her Lipid level drawn yesterday at her PCP office.     PFT showed severe obstructive disease,    Followed per DR. Lee           Plan:   Discussed with [facility]- will have patient to follow up with Dr. Freddy Telles for          I have addressed the patient's cardiac risk factors and adjusted pharmacologic treatment as needed. In addition, I have reinforced the need for patient directed risk factor modification.     Further evaluation will be based upon the patient's clinical course and testing results.     All questions and concerns were addressed to the patient/family. Alternatives to  treatment were discussed.      The patient  Currently is not smoking.  The risks related to smoking

## 2018-03-30 ENCOUNTER — CARE COORDINATION (OUTPATIENT)
Dept: CASE MANAGEMENT | Age: 78
End: 2018-03-30

## 2018-03-30 NOTE — TELEPHONE ENCOUNTER
Spoke with patient and she stated she was wanting to keep seeing [facility] and [facility], and she did not want to switch doctors, stated she wanted to cancel her appointment with [facility].

## 2018-04-05 ENCOUNTER — CARE COORDINATION (OUTPATIENT)
Dept: CASE MANAGEMENT | Age: 78
End: 2018-04-05

## 2018-04-12 ENCOUNTER — OFFICE VISIT (OUTPATIENT)
Dept: CARDIOLOGY CLINIC | Age: 78
End: 2018-04-12

## 2018-04-12 ENCOUNTER — TELEPHONE (OUTPATIENT)
Dept: CARDIOLOGY CLINIC | Age: 78
End: 2018-04-12

## 2018-04-12 VITALS
SYSTOLIC BLOOD PRESSURE: 128 MMHG | HEART RATE: 70 BPM | HEIGHT: 63 IN | OXYGEN SATURATION: 96 % | WEIGHT: 143 LBS | BODY MASS INDEX: 25.34 KG/M2 | DIASTOLIC BLOOD PRESSURE: 60 MMHG

## 2018-04-12 DIAGNOSIS — Z95.0 PACEMAKER: ICD-10-CM

## 2018-04-12 DIAGNOSIS — I25.10 CHRONIC TOTAL OCCLUSION OF NATIVE CORONARY ARTERY: Primary | ICD-10-CM

## 2018-04-12 DIAGNOSIS — I25.119 CORONARY ARTERY DISEASE INVOLVING NATIVE HEART WITH ANGINA PECTORIS, UNSPECIFIED VESSEL OR LESION TYPE (HCC): Chronic | ICD-10-CM

## 2018-04-12 DIAGNOSIS — I25.82 CHRONIC TOTAL OCCLUSION OF NATIVE CORONARY ARTERY: Primary | ICD-10-CM

## 2018-04-12 DIAGNOSIS — I48.0 PAROXYSMAL ATRIAL FIBRILLATION (HCC): Chronic | ICD-10-CM

## 2018-04-12 DIAGNOSIS — E78.2 MIXED HYPERLIPIDEMIA: Chronic | ICD-10-CM

## 2018-04-12 DIAGNOSIS — I48.3 TYPICAL ATRIAL FLUTTER (HCC): ICD-10-CM

## 2018-04-12 DIAGNOSIS — I10 ESSENTIAL HYPERTENSION, BENIGN: Chronic | ICD-10-CM

## 2018-04-12 PROCEDURE — 99215 OFFICE O/P EST HI 40 MIN: CPT | Performed by: INTERNAL MEDICINE

## 2018-04-12 PROCEDURE — 1090F PRES/ABSN URINE INCON ASSESS: CPT | Performed by: INTERNAL MEDICINE

## 2018-04-12 PROCEDURE — G8417 CALC BMI ABV UP PARAM F/U: HCPCS | Performed by: INTERNAL MEDICINE

## 2018-04-12 PROCEDURE — G8427 DOCREV CUR MEDS BY ELIG CLIN: HCPCS | Performed by: INTERNAL MEDICINE

## 2018-04-18 RX ORDER — RIVAROXABAN 15 MG/1
TABLET, FILM COATED ORAL
Qty: 90 TABLET | Refills: 3 | Status: ON HOLD | OUTPATIENT
Start: 2018-04-18 | End: 2018-12-03

## 2018-04-24 ENCOUNTER — TELEPHONE (OUTPATIENT)
Dept: RHEUMATOLOGY | Age: 78
End: 2018-04-24

## 2018-04-24 ENCOUNTER — OFFICE VISIT (OUTPATIENT)
Dept: RHEUMATOLOGY | Age: 78
End: 2018-04-24

## 2018-04-24 VITALS
BODY MASS INDEX: 25.52 KG/M2 | HEART RATE: 72 BPM | WEIGHT: 144 LBS | HEIGHT: 63 IN | DIASTOLIC BLOOD PRESSURE: 70 MMHG | SYSTOLIC BLOOD PRESSURE: 134 MMHG

## 2018-04-24 DIAGNOSIS — M1A.00X0 CHRONIC GOUTY ARTHROPATHY: Primary | ICD-10-CM

## 2018-04-24 DIAGNOSIS — M81.0 AGE-RELATED OSTEOPOROSIS WITHOUT CURRENT PATHOLOGICAL FRACTURE: ICD-10-CM

## 2018-04-24 DIAGNOSIS — Z79.899 ENCOUNTER FOR LONG-TERM (CURRENT) USE OF MEDICATIONS: ICD-10-CM

## 2018-04-24 LAB
ALBUMIN SERPL-MCNC: 4.2 G/DL (ref 3.4–5)
ALP BLD-CCNC: 82 U/L (ref 40–129)
ALT SERPL-CCNC: 14 U/L (ref 10–40)
AST SERPL-CCNC: 17 U/L (ref 15–37)
BILIRUB SERPL-MCNC: 0.5 MG/DL (ref 0–1)
BILIRUBIN DIRECT: <0.2 MG/DL (ref 0–0.3)
BILIRUBIN, INDIRECT: NORMAL MG/DL (ref 0–1)
CALCIUM SERPL-MCNC: 9.4 MG/DL (ref 8.3–10.6)
CREAT SERPL-MCNC: 1.1 MG/DL (ref 0.6–1.2)
GFR AFRICAN AMERICAN: 58
GFR NON-AFRICAN AMERICAN: 48
TOTAL PROTEIN: 6.6 G/DL (ref 6.4–8.2)
URIC ACID, SERUM: 9.4 MG/DL (ref 2.6–6)

## 2018-04-24 PROCEDURE — G8598 ASA/ANTIPLAT THER USED: HCPCS | Performed by: INTERNAL MEDICINE

## 2018-04-24 PROCEDURE — G8417 CALC BMI ABV UP PARAM F/U: HCPCS | Performed by: INTERNAL MEDICINE

## 2018-04-24 PROCEDURE — G8427 DOCREV CUR MEDS BY ELIG CLIN: HCPCS | Performed by: INTERNAL MEDICINE

## 2018-04-24 PROCEDURE — 4040F PNEUMOC VAC/ADMIN/RCVD: CPT | Performed by: INTERNAL MEDICINE

## 2018-04-24 PROCEDURE — 1090F PRES/ABSN URINE INCON ASSESS: CPT | Performed by: INTERNAL MEDICINE

## 2018-04-24 PROCEDURE — 1036F TOBACCO NON-USER: CPT | Performed by: INTERNAL MEDICINE

## 2018-04-24 PROCEDURE — G8399 PT W/DXA RESULTS DOCUMENT: HCPCS | Performed by: INTERNAL MEDICINE

## 2018-04-24 PROCEDURE — 99213 OFFICE O/P EST LOW 20 MIN: CPT | Performed by: INTERNAL MEDICINE

## 2018-04-24 PROCEDURE — 1123F ACP DISCUSS/DSCN MKR DOCD: CPT | Performed by: INTERNAL MEDICINE

## 2018-04-24 RX ORDER — ERGOCALCIFEROL 1.25 MG/1
50000 CAPSULE ORAL WEEKLY
Qty: 12 CAPSULE | Refills: 1 | Status: SHIPPED | OUTPATIENT
Start: 2018-04-24 | End: 2019-12-17

## 2018-04-24 RX ORDER — ALLOPURINOL 300 MG/1
300 TABLET ORAL DAILY
Qty: 90 TABLET | Refills: 1 | Status: SHIPPED | OUTPATIENT
Start: 2018-04-24 | End: 2018-07-16

## 2018-04-30 ENCOUNTER — TELEPHONE (OUTPATIENT)
Dept: RHEUMATOLOGY | Age: 78
End: 2018-04-30

## 2018-04-30 DIAGNOSIS — M1A.00X0 CHRONIC GOUTY ARTHROPATHY: Primary | ICD-10-CM

## 2018-04-30 RX ORDER — ALLOPURINOL 100 MG/1
100 TABLET ORAL DAILY
COMMUNITY
End: 2018-07-16 | Stop reason: SDUPTHER

## 2018-05-01 ENCOUNTER — NURSE ONLY (OUTPATIENT)
Dept: RHEUMATOLOGY | Age: 78
End: 2018-05-01

## 2018-05-01 VITALS
RESPIRATION RATE: 16 BRPM | HEART RATE: 60 BPM | TEMPERATURE: 97.5 F | SYSTOLIC BLOOD PRESSURE: 132 MMHG | DIASTOLIC BLOOD PRESSURE: 58 MMHG

## 2018-05-01 DIAGNOSIS — M81.0 AGE-RELATED OSTEOPOROSIS WITHOUT CURRENT PATHOLOGICAL FRACTURE: ICD-10-CM

## 2018-05-01 DIAGNOSIS — M1A.00X0 CHRONIC GOUTY ARTHROPATHY: ICD-10-CM

## 2018-05-01 LAB — URIC ACID, SERUM: 5.4 MG/DL (ref 2.6–6)

## 2018-05-01 PROCEDURE — 96365 THER/PROPH/DIAG IV INF INIT: CPT | Performed by: INTERNAL MEDICINE

## 2018-05-01 RX ORDER — ZOLEDRONIC ACID 5 MG/100ML
5 INJECTION, SOLUTION INTRAVENOUS ONCE
COMMUNITY
End: 2019-07-22

## 2018-05-01 RX ORDER — ZOLEDRONIC ACID 5 MG/100ML
5 INJECTION, SOLUTION INTRAVENOUS ONCE
Status: COMPLETED | OUTPATIENT
Start: 2018-05-01 | End: 2018-05-01

## 2018-05-01 RX ADMIN — ZOLEDRONIC ACID 5 MG: 5 INJECTION, SOLUTION INTRAVENOUS at 10:25

## 2018-05-02 ENCOUNTER — TELEPHONE (OUTPATIENT)
Dept: CARDIOLOGY CLINIC | Age: 78
End: 2018-05-02

## 2018-05-15 ENCOUNTER — OFFICE VISIT (OUTPATIENT)
Dept: CARDIOLOGY CLINIC | Age: 78
End: 2018-05-15

## 2018-05-15 ENCOUNTER — PROCEDURE VISIT (OUTPATIENT)
Dept: CARDIOLOGY CLINIC | Age: 78
End: 2018-05-15

## 2018-05-15 VITALS
WEIGHT: 135.12 LBS | DIASTOLIC BLOOD PRESSURE: 72 MMHG | BODY MASS INDEX: 23.94 KG/M2 | HEART RATE: 90 BPM | HEIGHT: 63 IN | SYSTOLIC BLOOD PRESSURE: 150 MMHG

## 2018-05-15 DIAGNOSIS — I25.119 CORONARY ARTERY DISEASE INVOLVING NATIVE CORONARY ARTERY OF NATIVE HEART WITH ANGINA PECTORIS (HCC): ICD-10-CM

## 2018-05-15 DIAGNOSIS — I48.0 PAF (PAROXYSMAL ATRIAL FIBRILLATION) (HCC): Primary | ICD-10-CM

## 2018-05-15 DIAGNOSIS — E78.00 PURE HYPERCHOLESTEROLEMIA: ICD-10-CM

## 2018-05-15 DIAGNOSIS — I48.0 PAROXYSMAL ATRIAL FIBRILLATION (HCC): Chronic | ICD-10-CM

## 2018-05-15 DIAGNOSIS — I47.1 PAT (PAROXYSMAL ATRIAL TACHYCARDIA) (HCC): ICD-10-CM

## 2018-05-15 DIAGNOSIS — Z95.0 PACEMAKER: ICD-10-CM

## 2018-05-15 DIAGNOSIS — I48.92 ATRIAL FLUTTER, UNSPECIFIED TYPE (HCC): ICD-10-CM

## 2018-05-15 DIAGNOSIS — I10 ESSENTIAL HYPERTENSION: ICD-10-CM

## 2018-05-15 PROCEDURE — G8598 ASA/ANTIPLAT THER USED: HCPCS | Performed by: INTERNAL MEDICINE

## 2018-05-15 PROCEDURE — G8399 PT W/DXA RESULTS DOCUMENT: HCPCS | Performed by: INTERNAL MEDICINE

## 2018-05-15 PROCEDURE — 1123F ACP DISCUSS/DSCN MKR DOCD: CPT | Performed by: INTERNAL MEDICINE

## 2018-05-15 PROCEDURE — G8427 DOCREV CUR MEDS BY ELIG CLIN: HCPCS | Performed by: INTERNAL MEDICINE

## 2018-05-15 PROCEDURE — 1090F PRES/ABSN URINE INCON ASSESS: CPT | Performed by: INTERNAL MEDICINE

## 2018-05-15 PROCEDURE — 1036F TOBACCO NON-USER: CPT | Performed by: INTERNAL MEDICINE

## 2018-05-15 PROCEDURE — 4040F PNEUMOC VAC/ADMIN/RCVD: CPT | Performed by: INTERNAL MEDICINE

## 2018-05-15 PROCEDURE — G8420 CALC BMI NORM PARAMETERS: HCPCS | Performed by: INTERNAL MEDICINE

## 2018-05-15 PROCEDURE — 99214 OFFICE O/P EST MOD 30 MIN: CPT | Performed by: INTERNAL MEDICINE

## 2018-05-15 PROCEDURE — 93280 PM DEVICE PROGR EVAL DUAL: CPT | Performed by: INTERNAL MEDICINE

## 2018-05-21 ENCOUNTER — TELEPHONE (OUTPATIENT)
Dept: CARDIOLOGY CLINIC | Age: 78
End: 2018-05-21

## 2018-05-21 ENCOUNTER — HOSPITAL ENCOUNTER (OUTPATIENT)
Dept: NON INVASIVE DIAGNOSTICS | Age: 78
Discharge: HOME | End: 2018-05-21
Attending: INTERNAL MEDICINE | Admitting: INTERNAL MEDICINE

## 2018-05-21 DIAGNOSIS — I25.10 ATHEROSCLEROTIC HEART DISEASE OF NATIVE CORONARY ARTERY WITHOUT ANGINA PECTORIS: ICD-10-CM

## 2018-05-23 RX ORDER — MECLIZINE HCL 12.5 MG/1
12.5 TABLET ORAL 3 TIMES DAILY PRN
Qty: 30 TABLET | Refills: 0 | Status: SHIPPED | OUTPATIENT
Start: 2018-05-23 | End: 2018-06-02

## 2018-05-25 ENCOUNTER — HOSPITAL ENCOUNTER (OUTPATIENT)
Dept: OTHER | Age: 78
Discharge: HOME | End: 2018-05-25
Attending: INTERNAL MEDICINE | Admitting: INTERNAL MEDICINE

## 2018-05-25 DIAGNOSIS — I48.0 PAF (PAROXYSMAL ATRIAL FIBRILLATION) (HCC): ICD-10-CM

## 2018-05-25 LAB
T4 FREE: 2.1 NG/DL (ref 0.9–1.8)
TSH REFLEX: 0.06 UIU/ML (ref 0.27–4.2)

## 2018-05-30 ENCOUNTER — TELEPHONE (OUTPATIENT)
Dept: CARDIOLOGY CLINIC | Age: 78
End: 2018-05-30

## 2018-05-30 DIAGNOSIS — E03.9 ACQUIRED HYPOTHYROIDISM: Primary | ICD-10-CM

## 2018-05-31 RX ORDER — LEVOTHYROXINE SODIUM 0.12 MG/1
TABLET ORAL
Qty: 90 TABLET | Refills: 0 | Status: SHIPPED | OUTPATIENT
Start: 2018-05-31 | End: 2018-07-25 | Stop reason: SDUPTHER

## 2018-06-04 ENCOUNTER — HOSPITAL ENCOUNTER (OUTPATIENT)
Dept: PULMONOLOGY | Age: 78
Discharge: HOME | End: 2018-06-04
Attending: INTERNAL MEDICINE | Admitting: INTERNAL MEDICINE

## 2018-06-04 VITALS — OXYGEN SATURATION: 97 % | HEART RATE: 70 BPM | RESPIRATION RATE: 18 BRPM

## 2018-06-04 DIAGNOSIS — I25.10 ATHEROSCLEROTIC HEART DISEASE OF NATIVE CORONARY ARTERY WITHOUT ANGINA PECTORIS: ICD-10-CM

## 2018-06-11 ENCOUNTER — TELEPHONE (OUTPATIENT)
Dept: CARDIOLOGY CLINIC | Age: 78
End: 2018-06-11

## 2018-06-18 DIAGNOSIS — E05.90 HYPERTHYROIDISM: Primary | ICD-10-CM

## 2018-06-21 ENCOUNTER — TELEPHONE (OUTPATIENT)
Dept: FAMILY MEDICINE CLINIC | Age: 78
End: 2018-06-21

## 2018-06-21 DIAGNOSIS — M15.9 PRIMARY OSTEOARTHRITIS INVOLVING MULTIPLE JOINTS: ICD-10-CM

## 2018-06-21 RX ORDER — TIZANIDINE 4 MG/1
4 TABLET ORAL EVERY 6 HOURS PRN
Qty: 100 TABLET | Refills: 0 | Status: SHIPPED | OUTPATIENT
Start: 2018-06-21 | End: 2019-12-17

## 2018-06-21 RX ORDER — HYDROCODONE BITARTRATE AND ACETAMINOPHEN 5; 325 MG/1; MG/1
1-2 TABLET ORAL EVERY 4 HOURS PRN
Qty: 150 TABLET | Refills: 0 | Status: SHIPPED | OUTPATIENT
Start: 2018-06-21 | End: 2018-09-19 | Stop reason: SDUPTHER

## 2018-06-26 ENCOUNTER — OFFICE VISIT (OUTPATIENT)
Dept: CARDIOLOGY CLINIC | Age: 78
End: 2018-06-26

## 2018-06-26 VITALS
HEIGHT: 63 IN | BODY MASS INDEX: 25.34 KG/M2 | SYSTOLIC BLOOD PRESSURE: 120 MMHG | WEIGHT: 143 LBS | DIASTOLIC BLOOD PRESSURE: 86 MMHG | HEART RATE: 113 BPM

## 2018-06-26 DIAGNOSIS — I25.119 CORONARY ARTERY DISEASE INVOLVING NATIVE HEART WITH ANGINA PECTORIS, UNSPECIFIED VESSEL OR LESION TYPE (HCC): Primary | Chronic | ICD-10-CM

## 2018-06-26 DIAGNOSIS — I48.0 PAROXYSMAL ATRIAL FIBRILLATION (HCC): Chronic | ICD-10-CM

## 2018-06-26 DIAGNOSIS — I10 ESSENTIAL HYPERTENSION, BENIGN: Chronic | ICD-10-CM

## 2018-06-26 DIAGNOSIS — E78.2 MIXED HYPERLIPIDEMIA: Chronic | ICD-10-CM

## 2018-06-26 PROCEDURE — G8427 DOCREV CUR MEDS BY ELIG CLIN: HCPCS | Performed by: NURSE PRACTITIONER

## 2018-06-26 PROCEDURE — 1090F PRES/ABSN URINE INCON ASSESS: CPT | Performed by: NURSE PRACTITIONER

## 2018-06-26 PROCEDURE — G8417 CALC BMI ABV UP PARAM F/U: HCPCS | Performed by: NURSE PRACTITIONER

## 2018-06-26 PROCEDURE — 4040F PNEUMOC VAC/ADMIN/RCVD: CPT | Performed by: NURSE PRACTITIONER

## 2018-06-26 PROCEDURE — 1123F ACP DISCUSS/DSCN MKR DOCD: CPT | Performed by: NURSE PRACTITIONER

## 2018-06-26 PROCEDURE — 1036F TOBACCO NON-USER: CPT | Performed by: NURSE PRACTITIONER

## 2018-06-26 PROCEDURE — 99214 OFFICE O/P EST MOD 30 MIN: CPT | Performed by: NURSE PRACTITIONER

## 2018-06-26 PROCEDURE — G8399 PT W/DXA RESULTS DOCUMENT: HCPCS | Performed by: NURSE PRACTITIONER

## 2018-06-26 PROCEDURE — G8598 ASA/ANTIPLAT THER USED: HCPCS | Performed by: NURSE PRACTITIONER

## 2018-07-16 ENCOUNTER — OFFICE VISIT (OUTPATIENT)
Dept: FAMILY MEDICINE CLINIC | Age: 78
End: 2018-07-16

## 2018-07-16 VITALS
HEART RATE: 70 BPM | WEIGHT: 143 LBS | BODY MASS INDEX: 25.33 KG/M2 | SYSTOLIC BLOOD PRESSURE: 122 MMHG | OXYGEN SATURATION: 98 % | DIASTOLIC BLOOD PRESSURE: 84 MMHG

## 2018-07-16 DIAGNOSIS — M15.9 PRIMARY OSTEOARTHRITIS INVOLVING MULTIPLE JOINTS: ICD-10-CM

## 2018-07-16 DIAGNOSIS — I25.10 CHRONIC TOTAL OCCLUSION OF NATIVE CORONARY ARTERY: ICD-10-CM

## 2018-07-16 DIAGNOSIS — M1A.00X0 CHRONIC GOUTY ARTHROPATHY: ICD-10-CM

## 2018-07-16 DIAGNOSIS — I20.8 ANGINA AT REST (HCC): ICD-10-CM

## 2018-07-16 DIAGNOSIS — M54.16 LUMBAR RADICULOPATHY: ICD-10-CM

## 2018-07-16 DIAGNOSIS — I25.82 CHRONIC TOTAL OCCLUSION OF NATIVE CORONARY ARTERY: ICD-10-CM

## 2018-07-16 DIAGNOSIS — E03.9 ACQUIRED HYPOTHYROIDISM: Primary | ICD-10-CM

## 2018-07-16 PROCEDURE — 1090F PRES/ABSN URINE INCON ASSESS: CPT | Performed by: FAMILY MEDICINE

## 2018-07-16 PROCEDURE — G8427 DOCREV CUR MEDS BY ELIG CLIN: HCPCS | Performed by: FAMILY MEDICINE

## 2018-07-16 PROCEDURE — 1123F ACP DISCUSS/DSCN MKR DOCD: CPT | Performed by: FAMILY MEDICINE

## 2018-07-16 PROCEDURE — 4040F PNEUMOC VAC/ADMIN/RCVD: CPT | Performed by: FAMILY MEDICINE

## 2018-07-16 PROCEDURE — 1101F PT FALLS ASSESS-DOCD LE1/YR: CPT | Performed by: FAMILY MEDICINE

## 2018-07-16 PROCEDURE — G8417 CALC BMI ABV UP PARAM F/U: HCPCS | Performed by: FAMILY MEDICINE

## 2018-07-16 PROCEDURE — 99214 OFFICE O/P EST MOD 30 MIN: CPT | Performed by: FAMILY MEDICINE

## 2018-07-16 PROCEDURE — 1036F TOBACCO NON-USER: CPT | Performed by: FAMILY MEDICINE

## 2018-07-16 PROCEDURE — G8399 PT W/DXA RESULTS DOCUMENT: HCPCS | Performed by: FAMILY MEDICINE

## 2018-07-16 PROCEDURE — G8598 ASA/ANTIPLAT THER USED: HCPCS | Performed by: FAMILY MEDICINE

## 2018-07-16 RX ORDER — ALLOPURINOL 100 MG/1
100 TABLET ORAL DAILY
Qty: 90 TABLET | Refills: 3 | Status: SHIPPED | OUTPATIENT
Start: 2018-07-16 | End: 2019-09-11 | Stop reason: SDUPTHER

## 2018-07-16 ASSESSMENT — PATIENT HEALTH QUESTIONNAIRE - PHQ9
SUM OF ALL RESPONSES TO PHQ QUESTIONS 1-9: 0
2. FEELING DOWN, DEPRESSED OR HOPELESS: 0
1. LITTLE INTEREST OR PLEASURE IN DOING THINGS: 0
SUM OF ALL RESPONSES TO PHQ9 QUESTIONS 1 & 2: 0

## 2018-07-16 NOTE — PROGRESS NOTES
Subjective:      Patient ID: Jeanine Ledezma is a 68 y.o. female. HPI Patient presents for re-evaluation of chronic health problems including hypothyroidism, angina, left leg numbness and gout. Patient presents today for a follow-up on chronic medications and conditions. Patient's thyroid medication was adjusted 8 weeks ago and she is due for repeat thyroid blood test.  Patient denies any heart palpitations but she continues to have chest discomfort on a fairly regular basis. The plan per patient is to do a angioplasty and stent placement of the right coronary artery on August 8. Patient states her legs have been going numb at night and she has to get up and move around and then the feeling comes back in her legs. Patient denies any symptoms throughout the day but it seems to bother her more at nighttime and she'll often awaken with her left leg having numbness and discomfort always up to her thigh. Patient has known osteoarthritis of multiple joints. Patient continues to be a care of cardiology and rheumatology. Patient typically goes through 1 pills of pain medication every 3-4 months. Patient denies any issues about bladder    Review of Systems     Patient Active Problem List   Diagnosis    Paroxysmal atrial fibrillation ([address]    Coronary artery disease involving native heart with angina pectoris ([address]    Essential hypertension, benign    Mixed hyperlipidemia    Chronic gouty arthropathy    Hypertension    Acquired hypothyroidism    Arthritis    Heart valve problem    Restrictive lung disease    Sick sinus syndrome ([address]    Allergic rhinitis    Fibrocystic breast    Cerebrovascular accident (CVA) ([address]    Pacemaker    Typical atrial flutter (HCC)    Primary osteoarthritis involving multiple joints    Vitamin D deficiency    Tremor    Dyspnea    Angina at rest [facility])    Chest pain    Chest discomfort    Chronic total occlusion of native coronary artery    Age related osteoporosis

## 2018-07-19 LAB — TSH SERPL DL<=0.05 MIU/L-ACNC: 0.06 UIU/ML (ref 0.36–3.74)

## 2018-07-24 ENCOUNTER — TELEPHONE (OUTPATIENT)
Dept: CARDIOLOGY CLINIC | Age: 78
End: 2018-07-24

## 2018-07-24 NOTE — TELEPHONE ENCOUNTER
Spoke with patient and informed her of time change for  on 8/7 from 8am to 9am, arriving at 7:15am to registration.

## 2018-07-25 ENCOUNTER — TELEPHONE (OUTPATIENT)
Dept: FAMILY MEDICINE CLINIC | Age: 78
End: 2018-07-25

## 2018-07-25 DIAGNOSIS — E03.9 ACQUIRED HYPOTHYROIDISM: Primary | ICD-10-CM

## 2018-07-25 RX ORDER — LEVOTHYROXINE SODIUM 112 UG/1
TABLET ORAL
Qty: 90 TABLET | Refills: 0 | Status: SHIPPED | OUTPATIENT
Start: 2018-07-25 | End: 2018-10-12 | Stop reason: SDUPTHER

## 2018-07-25 NOTE — TELEPHONE ENCOUNTER
----- Message from Betina Eldridge MD sent at 7/20/2018  9:13 AM EDT -----  Thyroid level still off.   Decrease levothyroxine to 112 µg daily and recheck TSH in 2 months

## 2018-08-07 ENCOUNTER — HOSPITAL ENCOUNTER (OUTPATIENT)
Dept: CARDIAC CATH/INVASIVE PROCEDURES | Age: 78
Discharge: HOME | End: 2018-08-08
Attending: INTERNAL MEDICINE | Admitting: INTERNAL MEDICINE
Payer: MEDICARE

## 2018-08-07 LAB
ANION GAP SERPL CALCULATED.3IONS-SCNC: 10 MMOL/L (ref 3–16)
BUN BLDV-MCNC: 34 MG/DL (ref 7–20)
CALCIUM SERPL-MCNC: 9.5 MG/DL (ref 8.3–10.6)
CHLORIDE BLD-SCNC: 105 MMOL/L (ref 99–110)
CO2: 25 MMOL/L (ref 21–32)
CREAT SERPL-MCNC: 1.1 MG/DL (ref 0.6–1.2)
GFR AFRICAN AMERICAN: 58
GFR NON-AFRICAN AMERICAN: 48
GLUCOSE BLD-MCNC: 109 MG/DL (ref 70–99)
HCT VFR BLD CALC: 44.3 % (ref 36–48)
HEMOGLOBIN: 14.4 G/DL (ref 12–16)
INR BLD: 1.07 (ref 0.86–1.14)
MCH RBC QN AUTO: 26.2 PG (ref 26–34)
MCHC RBC AUTO-ENTMCNC: 32.6 G/DL (ref 31–36)
MCV RBC AUTO: 80.2 FL (ref 80–100)
PDW BLD-RTO: 18.2 % (ref 12.4–15.4)
PLATELET # BLD: 294 K/UL (ref 135–450)
PMV BLD AUTO: 8.6 FL (ref 5–10.5)
POTASSIUM SERPL-SCNC: 4.3 MMOL/L (ref 3.5–5.1)
PROTHROMBIN TIME: 12.2 SEC (ref 9.8–13)
RBC # BLD: 5.52 M/UL (ref 4–5.2)
SODIUM BLD-SCNC: 140 MMOL/L (ref 136–145)
WBC # BLD: 10.2 K/UL (ref 4–11)

## 2018-08-07 PROCEDURE — 85027 COMPLETE CBC AUTOMATED: CPT

## 2018-08-07 PROCEDURE — C1887 CATHETER, GUIDING: HCPCS

## 2018-08-07 PROCEDURE — 6370000000 HC RX 637 (ALT 250 FOR IP)

## 2018-08-07 PROCEDURE — 92943 PRQ TRLUML REVSC CH OCC ANT: CPT

## 2018-08-07 PROCEDURE — 6370000000 HC RX 637 (ALT 250 FOR IP): Performed by: INTERNAL MEDICINE

## 2018-08-07 PROCEDURE — 6360000004 HC RX CONTRAST MEDICATION: Performed by: INTERNAL MEDICINE

## 2018-08-07 PROCEDURE — 92920 PRQ TRLUML C ANGIOP 1ART&/BR: CPT | Performed by: INTERNAL MEDICINE

## 2018-08-07 PROCEDURE — 2580000003 HC RX 258: Performed by: INTERNAL MEDICINE

## 2018-08-07 PROCEDURE — G0378 HOSPITAL OBSERVATION PER HR: HCPCS

## 2018-08-07 PROCEDURE — 99152 MOD SED SAME PHYS/QHP 5/>YRS: CPT | Performed by: INTERNAL MEDICINE

## 2018-08-07 PROCEDURE — 99153 MOD SED SAME PHYS/QHP EA: CPT

## 2018-08-07 PROCEDURE — 2709999900 HC NON-CHARGEABLE SUPPLY

## 2018-08-07 PROCEDURE — 85610 PROTHROMBIN TIME: CPT

## 2018-08-07 PROCEDURE — C1894 INTRO/SHEATH, NON-LASER: HCPCS

## 2018-08-07 PROCEDURE — C1725 CATH, TRANSLUMIN NON-LASER: HCPCS

## 2018-08-07 PROCEDURE — 2500000003 HC RX 250 WO HCPCS

## 2018-08-07 PROCEDURE — 93005 ELECTROCARDIOGRAM TRACING: CPT | Performed by: INTERNAL MEDICINE

## 2018-08-07 PROCEDURE — C1760 CLOSURE DEV, VASC: HCPCS

## 2018-08-07 PROCEDURE — 2580000003 HC RX 258

## 2018-08-07 PROCEDURE — 6360000002 HC RX W HCPCS

## 2018-08-07 PROCEDURE — 99152 MOD SED SAME PHYS/QHP 5/>YRS: CPT

## 2018-08-07 PROCEDURE — 80048 BASIC METABOLIC PNL TOTAL CA: CPT

## 2018-08-07 PROCEDURE — 85347 COAGULATION TIME ACTIVATED: CPT

## 2018-08-07 PROCEDURE — 93010 ELECTROCARDIOGRAM REPORT: CPT | Performed by: INTERNAL MEDICINE

## 2018-08-07 PROCEDURE — C1769 GUIDE WIRE: HCPCS

## 2018-08-07 RX ORDER — TIZANIDINE 4 MG/1
4 TABLET ORAL EVERY 6 HOURS PRN
Status: DISCONTINUED | OUTPATIENT
Start: 2018-08-07 | End: 2018-08-08 | Stop reason: HOSPADM

## 2018-08-07 RX ORDER — LEVOTHYROXINE SODIUM 112 UG/1
112 TABLET ORAL DAILY
Status: DISCONTINUED | OUTPATIENT
Start: 2018-08-08 | End: 2018-08-08 | Stop reason: HOSPADM

## 2018-08-07 RX ORDER — CLOPIDOGREL BISULFATE 75 MG/1
75 TABLET ORAL DAILY
Status: DISCONTINUED | OUTPATIENT
Start: 2018-08-07 | End: 2018-08-07 | Stop reason: ALTCHOICE

## 2018-08-07 RX ORDER — ACETAMINOPHEN 325 MG/1
650 TABLET ORAL EVERY 4 HOURS PRN
Status: DISCONTINUED | OUTPATIENT
Start: 2018-08-07 | End: 2018-08-08 | Stop reason: HOSPADM

## 2018-08-07 RX ORDER — SODIUM CHLORIDE 0.9 % (FLUSH) 0.9 %
10 SYRINGE (ML) INJECTION PRN
Status: DISCONTINUED | OUTPATIENT
Start: 2018-08-07 | End: 2018-08-08 | Stop reason: HOSPADM

## 2018-08-07 RX ORDER — ERGOCALCIFEROL 1.25 MG/1
50000 CAPSULE ORAL WEEKLY
Status: DISCONTINUED | OUTPATIENT
Start: 2018-08-13 | End: 2018-08-08 | Stop reason: HOSPADM

## 2018-08-07 RX ORDER — ZOLEDRONIC ACID 5 MG/100ML
5 INJECTION, SOLUTION INTRAVENOUS ONCE
Status: DISCONTINUED | OUTPATIENT
Start: 2018-08-07 | End: 2018-08-07 | Stop reason: CLARIF

## 2018-08-07 RX ORDER — ALLOPURINOL 100 MG/1
100 TABLET ORAL DAILY
Status: DISCONTINUED | OUTPATIENT
Start: 2018-08-07 | End: 2018-08-08 | Stop reason: HOSPADM

## 2018-08-07 RX ORDER — TOPIRAMATE 100 MG/1
100 TABLET, FILM COATED ORAL 2 TIMES DAILY
Status: DISCONTINUED | OUTPATIENT
Start: 2018-08-07 | End: 2018-08-08 | Stop reason: HOSPADM

## 2018-08-07 RX ORDER — CLOPIDOGREL BISULFATE 75 MG/1
75 TABLET ORAL DAILY
Status: DISCONTINUED | OUTPATIENT
Start: 2018-08-08 | End: 2018-08-08 | Stop reason: HOSPADM

## 2018-08-07 RX ORDER — SODIUM CHLORIDE 9 MG/ML
INJECTION, SOLUTION INTRAVENOUS CONTINUOUS
Status: DISCONTINUED | OUTPATIENT
Start: 2018-08-07 | End: 2018-08-08

## 2018-08-07 RX ORDER — ONDANSETRON 2 MG/ML
4 INJECTION INTRAMUSCULAR; INTRAVENOUS EVERY 6 HOURS PRN
Status: DISCONTINUED | OUTPATIENT
Start: 2018-08-07 | End: 2018-08-08 | Stop reason: HOSPADM

## 2018-08-07 RX ORDER — AMLODIPINE BESYLATE 5 MG/1
10 TABLET ORAL DAILY
Status: DISCONTINUED | OUTPATIENT
Start: 2018-08-08 | End: 2018-08-08 | Stop reason: HOSPADM

## 2018-08-07 RX ORDER — SODIUM CHLORIDE 9 MG/ML
1000 INJECTION, SOLUTION INTRAVENOUS CONTINUOUS
Status: DISCONTINUED | OUTPATIENT
Start: 2018-08-07 | End: 2018-08-08

## 2018-08-07 RX ORDER — NITROGLYCERIN 400 UG/1
1 SPRAY ORAL EVERY 5 MIN PRN
Status: DISCONTINUED | OUTPATIENT
Start: 2018-08-07 | End: 2018-08-08 | Stop reason: HOSPADM

## 2018-08-07 RX ORDER — ASPIRIN 325 MG
325 TABLET ORAL ONCE
Status: DISCONTINUED | OUTPATIENT
Start: 2018-08-07 | End: 2018-08-07 | Stop reason: ALTCHOICE

## 2018-08-07 RX ORDER — ISOSORBIDE DINITRATE 10 MG/1
10 TABLET ORAL 3 TIMES DAILY
Status: DISCONTINUED | OUTPATIENT
Start: 2018-08-07 | End: 2018-08-08 | Stop reason: HOSPADM

## 2018-08-07 RX ORDER — METOPROLOL TARTRATE 50 MG/1
100 TABLET, FILM COATED ORAL 2 TIMES DAILY
Status: DISCONTINUED | OUTPATIENT
Start: 2018-08-07 | End: 2018-08-08 | Stop reason: HOSPADM

## 2018-08-07 RX ORDER — SODIUM CHLORIDE 0.9 % (FLUSH) 0.9 %
10 SYRINGE (ML) INJECTION EVERY 12 HOURS SCHEDULED
Status: DISCONTINUED | OUTPATIENT
Start: 2018-08-07 | End: 2018-08-08 | Stop reason: HOSPADM

## 2018-08-07 RX ADMIN — SODIUM CHLORIDE 1000 ML: 9 INJECTION, SOLUTION INTRAVENOUS at 22:39

## 2018-08-07 RX ADMIN — SODIUM CHLORIDE: 9 INJECTION, SOLUTION INTRAVENOUS at 14:51

## 2018-08-07 RX ADMIN — ISOSORBIDE DINITRATE 10 MG: 10 TABLET ORAL at 16:22

## 2018-08-07 RX ADMIN — METOPROLOL TARTRATE 100 MG: 50 TABLET ORAL at 20:39

## 2018-08-07 RX ADMIN — TIZANIDINE 4 MG: 4 TABLET ORAL at 22:42

## 2018-08-07 RX ADMIN — TOPIRAMATE 100 MG: 100 TABLET, FILM COATED ORAL at 20:38

## 2018-08-07 RX ADMIN — ALLOPURINOL 100 MG: 100 TABLET ORAL at 16:22

## 2018-08-07 RX ADMIN — Medication 325 MG: at 07:20

## 2018-08-07 RX ADMIN — ISOSORBIDE DINITRATE 10 MG: 10 TABLET ORAL at 20:38

## 2018-08-07 RX ADMIN — SODIUM CHLORIDE 1000 ML: 9 INJECTION, SOLUTION INTRAVENOUS at 07:20

## 2018-08-07 RX ADMIN — CLOPIDOGREL BISULFATE 75 MG: 75 TABLET ORAL at 07:20

## 2018-08-07 RX ADMIN — IOVERSOL 130 ML: 741 INJECTION INTRA-ARTERIAL; INTRAVENOUS at 11:52

## 2018-08-07 RX ADMIN — TIZANIDINE 4 MG: 4 TABLET ORAL at 16:22

## 2018-08-07 ASSESSMENT — PAIN - FUNCTIONAL ASSESSMENT: PAIN_FUNCTIONAL_ASSESSMENT: 0-10

## 2018-08-07 NOTE — H&P
reports that she quit smoking about 31 years ago. Her smoking use included Cigarettes. She has a 28.00 pack-year smoking history. She has never used smokeless tobacco. She reports that she does not drink alcohol or use drugs. Family History:  family history includes Cancer in her maternal grandmother, paternal grandmother, and sister; Diabetes in her brother and maternal uncle; Heart Disease in her brother, maternal aunt, and sister; Hypertension in her brother and paternal aunt; Stroke in her maternal aunt. Home Medications:  Reviewed and are listed in nursing record. and/or listed below  Current Facility-Administered Medications   Medication Dose Route Frequency Provider Last Rate Last Dose    0.9 % sodium chloride infusion  1,000 mL Intravenous Continuous Chaya Da Silva MD 30 mL/hr at 08/07/18 0720 1,000 mL at 08/07/18 0720    clopidogrel (PLAVIX) tablet 75 mg  75 mg Oral Daily Chaya Da Silva MD   75 mg at 08/07/18 0720        Allergies:  Aspirin; Ativan [lorazepam]; Diltiazem; Diltiazem hcl; Dabigatran; Dabigatran etexilate mesylate; Dabigatran etexilate mesylate; Iodides; Mysoline [primidone]; Nsaids; Other; and Sulfa antibiotics     Review of Systems:   All 14 point review of symptoms completed. Pertinent positives identified in the HPI, all other review of symptoms negative.     Physical Examination:    Ht 5' 3\" (1.6 m)   Wt 149 lb (67.6 kg)   BMI 26.39 kg/m²        General Appearance:  Alert, cooperative, no distress, appears stated age       Head:  Normocephalic, without obvious abnormality, atraumatic   Eyes:  PERRL, conjunctiva/corneas clear       Nose: Nares normal, no drainage or sinus tenderness   Throat: Lips, mucosa, and tongue normal       Neck: Supple, symmetrical, trachea midline, no adenopathy, thyroid: not enlarged, symmetric, no tenderness/mass/nodules, no carotid bruit or JVD       Lungs:   Clear to auscultation bilaterally, respirations unlabored   Chest Wall:  No tenderness or deformity       Heart:  Regular rhythm and normal rate; S1, S2 are normal; no murmur noted; no rub or gallop       Abdomen:   Soft, non-tender, bowel sounds active all four quadrants,  no masses, no organomegaly       Extremities: No cyanosis or edema. No deformity of hands or feet   Pulses: 2+ and symmetric       Skin: Skin color, texture, turgor normal. No rashes or lesions       Pysch: Normal mood and affect.  Alert, oriented x 3       Neurologic: Normal gross motor and sensory exam.       DIAGNOSTIC WORK UP:  Lab Data:  CBC:   Lab Results   Component Value Date    WBC 10.2 08/07/2018    HGB 14.4 08/07/2018    HCT 44.3 08/07/2018    MCV 80.2 08/07/2018     08/07/2018     BMP:   Lab Results   Component Value Date     08/07/2018    K 4.3 08/07/2018     08/07/2018    CO2 25 08/07/2018    PHOS 3.0 12/23/2017    BUN 34 08/07/2018    CREATININE 1.1 08/07/2018    CALCIUM 9.5 08/07/2018    GFRAA 58 08/07/2018    GFRAA 38 04/02/2013    MG 1.90 12/24/2017     LFTS:   Lab Results   Component Value Date    ALT 14 04/24/2018    AST 17 04/24/2018    ALKPHOS 82 04/24/2018    PROT 6.6 04/24/2018    PROT 7.1 10/19/2012    AGRATIO 1.4 03/21/2018    BILITOT 0.5 04/24/2018    BILITOT 0.5 03/19/2018     PT/INR: No results found for: PTINR  LIPID PANEL: No components found for: CHLPL  Lab Results   Component Value Date    TRIG 227 (A) 03/19/2018    TRIG 240 (H) 06/02/2016    TRIG 237 (H) 10/02/2015     Lab Results   Component Value Date    HDL 28 (A) 03/19/2018    HDL 30 (L) 06/02/2016    HDL 26 (L) 10/02/2015     Lab Results   Component Value Date    LDLCALC 89 03/19/2018    LDLCALC 75 06/02/2016    LDLCALC 47 10/02/2015     TSH:   Lab Results   Component Value Date    TSH 0.063 07/19/2018     FREET4: No results found for: [place] [address]: No components found for: FREET3  BNP:   Lab Results   Component Value Date     02/19/2013     01/28/2013     07/05/2012 Procedure/Imaging:  I have reviewed the below testing personally and my interpretation is below. Remote CABG 1987 x3   7/2008 Cath: Patent LIMA to LAD, occluded SVG to RCA with collaterals. Valerie Isabel will continue with her current regime including diet and exercise. .  2/3/17 Cath: which showed 100% prox RCA with bridging collateral, 100% SVG to RCA, 100% prox LAD after D1, D1 stent widely patent, Mild Cx disease, Normal LV FXN--EF 60%--EDP 12 post     Stress Test 6/28/16  Stress ECG Impressions: No significant ST changes during vasodilator infusion  Summary: - Abnormal moderat risk stress test with moderate size and severity  anterolateral wall ischemia - Normal LV size and systolic function    Echo 2/17/16  Normal LV size and systolic function. Mild to moderate mitral regurgitation. Mild tricuspid regurgitation      Assessment:   Diagnosis Orders   1. Chronic total occlusion of native coronary artery (prox RCA)    2. Coronary artery disease involving native heart with angina pectoris     3. Essential hypertension, benign     4. Mixed hyperlipidemia     5. Typical atrial flutter (HCC)     6. Paroxysmal atrial fibrillation ([address]     7. Pacemaker       Recommendation:  - I reviewed her cardiac cath films. She has chronic total occlusion () of dominant prox RCA filled by right-to-right epicardial collaterals. Her  is amenable to PCI with antegrade approach. Will schedule her nuclear study to determine viability in RCA distribution. She is on dual antianginal therapy. If RCA distribution is viable, will schedule her for PCI of the RCA . She is on Plavix for CAD. She is not on Asa as she is on Xarelto for atrial fibrillation. She is not on statins due to side effects. If you have questions, please do not hesitate to call me. I look forward to following Vanessa Wilburn along with you.       Louis Hussein MD, [facility] - [place], [place]  [phone] [facility] [place] office  [phone] Main central        H&P Update I have reviewed the history and physical and examined the patient and find no relevant changes. I have reviewed with the patient and/or family the risks, benefits, and alternatives to the procedure.        Louis Liz MD, [facility] - [place], [place]  [phone] [facility] office  [phone] [facility]

## 2018-08-07 NOTE — PROCEDURES
[facility]                              CARDIAC CATHETERIZATION    PATIENT NAME: Claudia Blanco                   :        1940  MED REC NO:   [id number]                          ROOM:  ACCOUNT NO:   [de-identified]                           ADMIT DATE: 2018  PROVIDER:     Ghanshyam Naqvi MD    DATE OF PROCEDURE:  2018    INDICATIONS:  1. Disabling angina. 2.   of proximal RCA. PROCEDURE PERFORMED:  1. Right coronary angiography. 2.  Angioplasty of proximal RCA. 3.  US guided right common femoral arterial access. DESCRIPTION OF THE PROCEDURE:  We accessed the right common femoral artery  under ultrasound guidance with a micropuncture needle and placed a short  6-[place] sheath, which was then upgraded to a 7-[place] 45-cm destination  sheath. We then advanced an [place] 0.75 guide catheter, which was 7-[place] and  the right coronary artery was engaged. The right coronary angiography was  completed that revealed distal filling of the RCA with large bridging  collaterals. On previous cath, we had noted that there was no retrograde  filling of the RCA. The ostium of the RCA showed 90% stenosis. We initially  advanced a Jeff wire, which was placed on the RV branch. Then, we advanced   a second Jeff, which was placed in the bridging collateral. Then, we loaded   a third Jeff wire on a turnpike LP catheter. However, we had trouble   advancing the turnpike LP catheter into the ostium of the RCA due to high grade  disease. Next, we then angioplastied the proximal RCA with a 2.5 mm   noncompliant balloon. After that, we placed the balloon into the RV marginal   branch. This was done to anchor the guide and also to provide support to   direct the wire towards the cap. We tried different wires including  200  and [place] three and [place] second.   None of these we were able to cross the

## 2018-08-07 NOTE — PROGRESS NOTES
Patient admitted to room 442 from Cath lab. Patient oriented to room, call light, bed rails, phone, lights and bathroom. Patient instructed about the schedule of the day including: vital sign frequency, lab draws, possible tests, frequency of MD and staff rounds, including RN/MD rounding together at bedside, daily weights, and I &O's. Patient instructed about prescribed diet, how to use 8MENU, and television. Bed alarm in place, patient aware of placement and reason. Telemetry box in place, patient aware of placement and reason. Bed locked, in lowest position, side rails up 2/4, call light within reach. Will continue to monitor.

## 2018-08-08 VITALS
RESPIRATION RATE: 16 BRPM | HEIGHT: 63 IN | BODY MASS INDEX: 26.26 KG/M2 | HEART RATE: 69 BPM | OXYGEN SATURATION: 97 % | WEIGHT: 148.2 LBS | DIASTOLIC BLOOD PRESSURE: 67 MMHG | SYSTOLIC BLOOD PRESSURE: 162 MMHG | TEMPERATURE: 97.8 F

## 2018-08-08 LAB
ANION GAP SERPL CALCULATED.3IONS-SCNC: 8 MMOL/L (ref 3–16)
BUN BLDV-MCNC: 27 MG/DL (ref 7–20)
CALCIUM SERPL-MCNC: 9.2 MG/DL (ref 8.3–10.6)
CHLORIDE BLD-SCNC: 111 MMOL/L (ref 99–110)
CO2: 23 MMOL/L (ref 21–32)
CREAT SERPL-MCNC: 1 MG/DL (ref 0.6–1.2)
GFR AFRICAN AMERICAN: >60
GFR NON-AFRICAN AMERICAN: 54
GLUCOSE BLD-MCNC: 107 MG/DL (ref 70–99)
POC ACT LR: 366 SEC
POTASSIUM REFLEX MAGNESIUM: 4.1 MMOL/L (ref 3.5–5.1)
SODIUM BLD-SCNC: 142 MMOL/L (ref 136–145)

## 2018-08-08 PROCEDURE — 99217 PR OBSERVATION CARE DISCHARGE MANAGEMENT: CPT | Performed by: NURSE PRACTITIONER

## 2018-08-08 PROCEDURE — 6370000000 HC RX 637 (ALT 250 FOR IP): Performed by: INTERNAL MEDICINE

## 2018-08-08 PROCEDURE — 80048 BASIC METABOLIC PNL TOTAL CA: CPT

## 2018-08-08 PROCEDURE — 36415 COLL VENOUS BLD VENIPUNCTURE: CPT

## 2018-08-08 PROCEDURE — 2580000003 HC RX 258: Performed by: INTERNAL MEDICINE

## 2018-08-08 PROCEDURE — G0378 HOSPITAL OBSERVATION PER HR: HCPCS

## 2018-08-08 RX ORDER — HYDROCODONE BITARTRATE AND ACETAMINOPHEN 5; 325 MG/1; MG/1
1 TABLET ORAL EVERY 6 HOURS PRN
Status: DISCONTINUED | OUTPATIENT
Start: 2018-08-08 | End: 2018-08-08 | Stop reason: HOSPADM

## 2018-08-08 RX ORDER — ISOSORBIDE DINITRATE 10 MG/1
10 TABLET ORAL 3 TIMES DAILY
Qty: 90 TABLET | Refills: 0 | Status: SHIPPED | OUTPATIENT
Start: 2018-08-08 | End: 2018-09-17

## 2018-08-08 RX ADMIN — ISOSORBIDE DINITRATE 10 MG: 10 TABLET ORAL at 14:18

## 2018-08-08 RX ADMIN — METOPROLOL TARTRATE 100 MG: 50 TABLET ORAL at 08:04

## 2018-08-08 RX ADMIN — TIZANIDINE 4 MG: 4 TABLET ORAL at 08:04

## 2018-08-08 RX ADMIN — LEVOTHYROXINE SODIUM 112 MCG: 112 TABLET ORAL at 05:00

## 2018-08-08 RX ADMIN — ALLOPURINOL 100 MG: 100 TABLET ORAL at 08:04

## 2018-08-08 RX ADMIN — CLOPIDOGREL BISULFATE 75 MG: 75 TABLET ORAL at 08:04

## 2018-08-08 RX ADMIN — ACETAMINOPHEN 650 MG: 325 TABLET, FILM COATED ORAL at 08:04

## 2018-08-08 RX ADMIN — SODIUM CHLORIDE, PRESERVATIVE FREE 10 ML: 5 INJECTION INTRAVENOUS at 08:05

## 2018-08-08 RX ADMIN — ISOSORBIDE DINITRATE 10 MG: 10 TABLET ORAL at 08:04

## 2018-08-08 RX ADMIN — TOPIRAMATE 100 MG: 100 TABLET, FILM COATED ORAL at 08:04

## 2018-08-08 RX ADMIN — AMLODIPINE BESYLATE 10 MG: 5 TABLET ORAL at 08:04

## 2018-08-08 ASSESSMENT — PAIN DESCRIPTION - PAIN TYPE: TYPE: CHRONIC PAIN

## 2018-08-08 ASSESSMENT — PAIN SCALES - GENERAL: PAINLEVEL_OUTOF10: 4

## 2018-08-08 ASSESSMENT — PAIN DESCRIPTION - LOCATION: LOCATION: BACK

## 2018-08-08 NOTE — FLOWSHEET NOTE
08/07/18 2030   Assessment   Charting Type Shift assessment   Neurological   Neuro (WDL) WDL   Level of Consciousness 0   Orientation Level Oriented X4;Oriented to time;Oriented to place;Oriented to situation;Oriented to person   Cognition Appropriate judgement; Appropriate safety awareness; Appropriate attention/concentration; Appropriate for developmental age; Follows commands   Language Clear; Appropriate for developmental age   [de-identified]   HEENT (WDL) X   Teeth Dentures upper;Dentures lower   Respiratory   Respiratory (WDL) X   Respiratory Pattern Regular   Respiratory Depth Normal   Breath Sounds   Right Upper Lobe Clear   Right Middle Lobe Clear   Right Lower Lobe Clear   Left Upper Lobe Clear   Left Lower Lobe Clear   Cardiac   Cardiac (WDL) X   Cardiac Regularity Regular   Heart Sounds S1, S2   Cardiac Rhythm A-V Sequential Paced   Cardiac Monitor   Telemetry Monitor On Yes   Telemetry Audible Yes   Telemetry Alarms Set Yes   Pacemaker   Pacemaker Yes   Gastrointestinal   Abdominal (WDL) WDL   Bowel Sounds (All Quadrants) Active   Passing Flatus Y   Bowel Sounds   RUQ Bowel Sounds Active   LUQ Bowel Sounds Active   RLQ Bowel Sounds Active   LLQ Bowel Sounds Active   Peripheral Vascular   Peripheral Vascular (WDL) WDL   RLE Neurovascular Assessment   R Pedal Pulse +1   Puncture Site Assessment 1   Location Femoral - right   Site Assessment No redness, drainage, swelling or hematoma   Dressing Applied Transparent occlusive dressing   Skin Color/Condition   Skin Color/Condition (WDL) WDL   Skin Integrity   Skin Integrity (WDL) WDL   Musculoskeletal   Musculoskeletal (WDL) WDL   Genitourinary   Genitourinary (WDL) WDL   Anus/Rectum   Anus/Rectum (WDL) WDL   Psychosocial   Psychosocial (WDL) WDL

## 2018-08-08 NOTE — CARE COORDINATION
[address] Transitions Interview     2018    Patient: Vincent Ochoa Patient : 1940   MRN: [id number]  Reason for Admission: Planned cardiac cath  RARS: Readmission Risk Score: 10       Spoke with: patient Patrick Carreno      Readmission Risk  Patient Active Problem List   Diagnosis    Paroxysmal atrial fibrillation ([address]    Coronary artery disease involving native heart with angina pectoris ([address]    Essential hypertension, benign    Mixed hyperlipidemia    Chronic gouty arthropathy    Hypertension    Acquired hypothyroidism    Arthritis    Heart valve problem    Restrictive lung disease    Sick sinus syndrome ([address]    Allergic rhinitis    Fibrocystic breast    Cerebrovascular accident (CVA) ([address]    Pacemaker    Typical atrial flutter ([facility]    Primary osteoarthritis involving multiple joints    Vitamin D deficiency    Tremor    Dyspnea    Angina at rest [facility])    Chest pain    Chest discomfort    Chronic total occlusion of native coronary artery    Age related osteoporosis       Inpatient Assessment  Care Transitions Summary    Care Transitions Inpatient Review  Medication Review  Do you have all of your prescriptions and are they filled?:  Yes   Are you able to afford your medications?:  Yes  How often do you have difficulty taking your medications?:  I always take them as prescribed. Housing Review  Who do you live with?:  Partner/Spouse/SO  Are you an active caregiver in your home?:  No  Social Support  Do you have a ?:  No  Do you have a [address] [place] [address]?:  No  Durable Medical Equipment  Patient DME:  Other  Other Patient DME:  grab bar in shower   Functional Review  Ability to seek help/take action for Emergent/Urgent situations i.e. fire, crime, inclement weather or health crisis. :  Independent  Ability handle personal hygiene needs (bathing/dressing/grooming): Independent  Ability to manage medications:   Independent  Ability to prepare food: Independent  Ability to maintain home (clean home, laundry): Independent  Ability to drive and/or has transportation:  Independent  Ability to do shopping:  Independent  Ability to manage finances: Independent  Is patient able to live independently?:  Yes  Hearing and Vision  Care Transitions Interventions     Met with patient to discuss care transition. Role of CTC and care transitions explained to patient. Patient lives with spouse, is totally independent with self care and manages household, still drives. Stays active. States her son and 2 daughters live very close by. Denies need for any post discharge services. Declined to participate in CTC program-appreciated it but stated she really didn't feel it was necessary. Reminded patient that if they have any questions/concerns at anytime, they can always call PCP/specialist as they have an MD on call 24/7. Will not include in care transition per patient request.    Follow Up  Future Appointments  Date Time Provider Triston Ford   8/9/2018 12:20 PM Lolis Toledo MD  ENDO [facility]   8/13/2018 10:00 AM Gorge Rogers MD  SLEEP MED [facility]   8/21/2018 10:00 AM SCHEDULE, [place] PHONE TRANSMISSION  Cardio [facility]   8/22/2018 10:45 AM TIFFANIE Vieyra - CNP  Cardio [facility]   10/24/2018 9:15 AM Shaun James MD  RHEUM [facility]   11/13/2018 9:30 AM SCHEDULE, [place] PACERS  Cardio [facility]   11/13/2018 10:00 AM Sorin Da Silva MD  Cardio [facility]       Health Maintenance  There are no preventive care reminders to display for this patient.     Elmore Koyanagi, RN

## 2018-08-08 NOTE — DISCHARGE SUMMARY
[address]  Discharge Summary  Patient ID:  Eloisa Sever  [id number] 52 y.o. 1940    Admit date: 8/7/2018    Discharge date:  8/8/18    Admitting Physician: Jose Alberto Lilly MD     Discharge Physician: Maris Gibson     Admission Diagnoses: Chronic total occlusion of native coronary artery [I25.10, I25.82]  Chronic total occlusion of native coronary artery [I25.10, I25.82]    Discharge Diagnoses:   Patient Active Problem List   Diagnosis    [facility]oxysmal [facility] fibrillation ([facility]    Coronary artery disease involving native heart with angina pectoris ([facility]    Essential hypertension, benign    Mixed hyperlipidemia    Chronic gouty arthropathy    Hypertension    Acquired hypothyroidism    Arthritis    Heart valve problem    Restrictive lung disease    Sick sinus syndrome ([facility]    Allergic rhinitis    Fibrocystic breast    Cerebrovascular accident (CVA) ([facility]    Pacemaker    Typical atrial flutter ([facility]    Primary osteoarthritis involving multiple joints    Vitamin D deficiency    Tremor    Dyspnea    Angina at rest [facility])    Chest pain    Chest discomfort    Chronic total occlusion of native coronary artery    Age related osteoporosis        Discharged Condition: good  The patient was seen and examined on day of discharge and this discharge summary is in conjunction with any daily progress note from day of discharge. Hospital Course: Eloisa Sever was admitted electively for cardiac cath for  of the proximal RCA for disabling angina. The PCI of complex proximal RCA chronic total occlusion was unsuccessful (J- score 3). Underwent successful Angioplasty of high-grade proximal RCA lesion proximal to the . RECOMMENDATIONS:  Attempt on this complex long  was unsuccessful.  Lack   Of retrograde collaterals along with a very ambiguous cap as well as a large RV marginal branch and bridging collateral coming off at the cap makes it a  challenging repeat

## 2018-08-08 NOTE — PLAN OF CARE
Problem: Falls - Risk of:  Goal: Will remain free from falls  Will remain free from falls   Outcome: Ongoing  Pt is free of falls at this time. Bed is in the lowest position, wheels locked, side rails up x 2, call light, and personal belongings within reach. Will continue to monitor.
disease)     stent:  post cataract surgery (CABG)    Cerebral artery occlusion with cerebral infarction ([facility]     TIA    Chronic gouty arthropathy     Chronic kidney disease     Congestive heart failure, unspecified     Degeneration of cervical intervertebral disc     Essential and other specified forms of tremor     Gout     HIGH CHOLESTEROL     History of CVA (cerebrovascular accident)     Hypertension     Influenza 12/23/2017    Mitral valve stenosis and aortic valve insufficiency     Movement disorder     back problems    Peptic ulcer, unspecified site, unspecified as acute or chronic, without mention of hemorrhage, perforation, or obstruction     Thyroid disease     Unspecified disorder of kidney and ureter     Unspecified hypothyroidism          Surgical History:  Past Surgical History:   Procedure Laterality Date    BACK SURGERY      CARDIAC CATHETERIZATION  7/2012    CARDIAC SURGERY      CABG & Cardiac ablation    CHOLECYSTECTOMY      CORONARY ARTERY BYPASS GRAFT  1987    HIP SURGERY Left 03/16/2017    Left hip pinning    JOINT REPLACEMENT      SHOULDER SURGERY      left         Medications:  Current Facility-Administered Medications   Medication Dose Route Frequency Provider Last Rate Last Dose    0.9 % sodium chloride infusion  1,000 mL Intravenous Continuous Valentino Vizcaino MD 30 mL/hr at 08/07/18 0720 1,000 mL at 08/07/18 0720    clopidogrel (PLAVIX) tablet 75 mg  75 mg Oral Daily Valentino Vizcaino MD   75 mg at 08/07/18 0720           Pre-Sedation:    Pre-Sedation Documentation and Exam:  I have personally completed a history, physical exam & review of systems for this patient (see notes).     Prior History of Anesthesia Complications:   none    Modified Mallampati:  I (soft palate, uvula, fauces, tonsillar pillars visible)    ASA Classification:  Class 3 - A patient with severe systemic disease that limits activity but is not incapacitating    Tess

## 2018-08-09 LAB
EKG ATRIAL RATE: 70 BPM
EKG DIAGNOSIS: NORMAL
EKG P-R INTERVAL: 276 MS
EKG Q-T INTERVAL: 436 MS
EKG QRS DURATION: 108 MS
EKG QTC CALCULATION (BAZETT): 470 MS
EKG R AXIS: -41 DEGREES
EKG T AXIS: 86 DEGREES
EKG VENTRICULAR RATE: 70 BPM

## 2018-08-16 ENCOUNTER — OFFICE VISIT (OUTPATIENT)
Dept: FAMILY MEDICINE CLINIC | Age: 78
End: 2018-08-16

## 2018-08-16 VITALS
BODY MASS INDEX: 26.22 KG/M2 | OXYGEN SATURATION: 97 % | SYSTOLIC BLOOD PRESSURE: 150 MMHG | DIASTOLIC BLOOD PRESSURE: 78 MMHG | HEART RATE: 104 BPM | WEIGHT: 148 LBS

## 2018-08-16 DIAGNOSIS — I48.0 PAROXYSMAL ATRIAL FIBRILLATION (HCC): Chronic | ICD-10-CM

## 2018-08-16 DIAGNOSIS — M54.16 LUMBAR RADICULOPATHY: ICD-10-CM

## 2018-08-16 DIAGNOSIS — I25.119 CORONARY ARTERY DISEASE INVOLVING NATIVE HEART WITH ANGINA PECTORIS, UNSPECIFIED VESSEL OR LESION TYPE (HCC): Primary | Chronic | ICD-10-CM

## 2018-08-16 DIAGNOSIS — F51.01 PRIMARY INSOMNIA: ICD-10-CM

## 2018-08-16 PROCEDURE — 1101F PT FALLS ASSESS-DOCD LE1/YR: CPT | Performed by: FAMILY MEDICINE

## 2018-08-16 PROCEDURE — G8427 DOCREV CUR MEDS BY ELIG CLIN: HCPCS | Performed by: FAMILY MEDICINE

## 2018-08-16 PROCEDURE — 1036F TOBACCO NON-USER: CPT | Performed by: FAMILY MEDICINE

## 2018-08-16 PROCEDURE — 4040F PNEUMOC VAC/ADMIN/RCVD: CPT | Performed by: FAMILY MEDICINE

## 2018-08-16 PROCEDURE — G8417 CALC BMI ABV UP PARAM F/U: HCPCS | Performed by: FAMILY MEDICINE

## 2018-08-16 PROCEDURE — 99214 OFFICE O/P EST MOD 30 MIN: CPT | Performed by: FAMILY MEDICINE

## 2018-08-16 PROCEDURE — 1123F ACP DISCUSS/DSCN MKR DOCD: CPT | Performed by: FAMILY MEDICINE

## 2018-08-16 PROCEDURE — G8598 ASA/ANTIPLAT THER USED: HCPCS | Performed by: FAMILY MEDICINE

## 2018-08-16 PROCEDURE — 1090F PRES/ABSN URINE INCON ASSESS: CPT | Performed by: FAMILY MEDICINE

## 2018-08-16 PROCEDURE — G8399 PT W/DXA RESULTS DOCUMENT: HCPCS | Performed by: FAMILY MEDICINE

## 2018-08-16 RX ORDER — PREDNISONE 20 MG/1
TABLET ORAL
Qty: 15 TABLET | Refills: 0 | Status: SHIPPED | OUTPATIENT
Start: 2018-08-16 | End: 2018-10-03

## 2018-08-16 RX ORDER — CHOLECALCIFEROL (VITAMIN D3) 1250 MCG
CAPSULE ORAL WEEKLY
COMMUNITY
End: 2018-09-17

## 2018-08-16 NOTE — PATIENT INSTRUCTIONS
Patient Education        Insomnia: Care Instructions  Your Care Instructions    Insomnia is the inability to sleep well. It is a common problem for most people at some time. Insomnia may make it hard for you to get to sleep, stay asleep, or sleep as long as you need to. This can make you tired and grouchy during the day. It can also make you forgetful, less effective at work, and unhappy. Insomnia can be caused by conditions such as depression or anxiety. Pain can also affect your ability to sleep. When these problems are solved, the insomnia usually clears up. But sometimes bad sleep habits can cause insomnia. If insomnia is affecting your work or your enjoyment of life, you can take steps to improve your sleep. Follow-up care is a key part of your treatment and safety. Be sure to make and go to all appointments, and call your doctor if you are having problems. It's also a good idea to know your test results and keep a list of the medicines you take. How can you care for yourself at home? What to avoid  · Do not have drinks with caffeine, such as coffee or black tea, for 8 hours before bed. · Do not smoke or use other types of tobacco near bedtime. Nicotine is a stimulant and can keep you awake. · Avoid drinking alcohol late in the evening, because it can cause you to wake in the middle of the night. · Do not eat a big meal close to bedtime. If you are hungry, eat a light snack. · Do not drink a lot of water close to bedtime, because the need to urinate may wake you up during the night. · Do not read or watch TV in bed. Use the bed only for sleeping and sexual activity. What to try  · Go to bed at the same time every night, and wake up at the same time every morning. Do not take naps during the day. · Keep your bedroom quiet, dark, and cool. · Sleep on a comfortable pillow and mattress. · If watching the clock makes you anxious, turn it facing away from you so you cannot see the time.   · If you worry when you lie down, start a worry book. Well before bedtime, write down your worries, and then set the book and your concerns aside. · Try meditation or other relaxation techniques before you go to bed. · If you cannot fall asleep, get up and go to another room until you feel sleepy. Do something relaxing. Repeat your bedtime routine before you go to bed again. · Make your house quiet and calm about an hour before bedtime. Turn down the lights, turn off the TV, log off the computer, and turn down the volume on music. This can help you relax after a busy day. When should you call for help? Watch closely for changes in your health, and be sure to contact your doctor if:    · Your efforts to improve your sleep do not work.     · Your insomnia gets worse.     · You have been feeling down, depressed, or hopeless or have lost interest in things that you usually enjoy. Where can you learn more? Go to https://[email or web address]. org and sign in to your [employer] account. Enter P513 in the appMobi box to learn more about \"Insomnia: Care Instructions. \"     If you do not have an account, please click on the \"Sign Up Now\" link. Current as of: October 10, 2017  Content Version: 11.7  © 5126-6007 [employer]. Care instructions adapted under license by [facility] [place] ([facility]). If you have questions about a medical condition or this instruction, always ask your healthcare professional. [place] [address] any warranty or liability for your use of this information.

## 2018-08-16 NOTE — PROGRESS NOTES
side.       Achilles reflexes are 2+ on the right side and 2+ on the left side. Skin: No rash noted. Psychiatric: She has a normal mood and affect. Her behavior is normal. Judgment and thought content normal.     Back Exam     Tenderness   The patient is experiencing no tenderness. Range of Motion   Extension: abnormal   Flexion: normal   Lateral Bend Right: abnormal   Lateral Bend Left: normal     Muscle Strength   Right Quadriceps:  5/5/5   Left Quadriceps:  5/5/5   Right Hamstrings:  5/5/5   Left Hamstrings:  5/5/5     Tests   Straight leg raise right: negative  Straight leg raise left: negative    Other   Gait: normal           Assessment:      Alvaro Suh was seen today for follow-up from hospital.    Diagnoses and all orders for this visit:    Coronary artery disease involving native heart with angina pectoris, unspecified vessel or lesion type (HCC)    Lumbar radiculopathy    Primary insomnia    Paroxysmal atrial fibrillation (HCC)    Other orders  -     predniSONE (DELTASONE) 20 MG tablet; 1 TID for 3 day then 1 BID  -     Melatonin 5 MG CAPS; 1-2 pills nightly    OARRS report checked        Plan:      Hospital information reviewed with patient. Agreed with medication changes. Patient's been taking extra dose of metoprolol today   Keep appointment for follow-up with cardiology  Lumbar radiculopathy secondary to lumbar disc disease  Information handout for in regards to sleep hygiene  RTC 1 month    Please note that this chart was generated using Dragon dictation software. Although every effort was made to ensure the accuracy of this automated transcription, some errors in transcription may have occurred.             Ana Laura Rojas MA

## 2018-08-21 ENCOUNTER — NURSE ONLY (OUTPATIENT)
Dept: CARDIOLOGY CLINIC | Age: 78
End: 2018-08-21

## 2018-08-21 DIAGNOSIS — Z95.0 PACEMAKER: ICD-10-CM

## 2018-08-21 DIAGNOSIS — I48.3 TYPICAL ATRIAL FLUTTER (HCC): ICD-10-CM

## 2018-08-21 PROCEDURE — 93296 REM INTERROG EVL PM/IDS: CPT | Performed by: INTERNAL MEDICINE

## 2018-08-21 PROCEDURE — 93294 REM INTERROG EVL PM/LDLS PM: CPT | Performed by: INTERNAL MEDICINE

## 2018-08-22 ENCOUNTER — OFFICE VISIT (OUTPATIENT)
Dept: CARDIOLOGY CLINIC | Age: 78
End: 2018-08-22

## 2018-08-22 VITALS
SYSTOLIC BLOOD PRESSURE: 132 MMHG | DIASTOLIC BLOOD PRESSURE: 66 MMHG | WEIGHT: 144.8 LBS | BODY MASS INDEX: 25.66 KG/M2 | OXYGEN SATURATION: 96 % | HEIGHT: 63 IN | HEART RATE: 71 BPM

## 2018-08-22 DIAGNOSIS — I25.119 CORONARY ARTERY DISEASE INVOLVING NATIVE HEART WITH ANGINA PECTORIS, UNSPECIFIED VESSEL OR LESION TYPE (HCC): Primary | Chronic | ICD-10-CM

## 2018-08-22 DIAGNOSIS — I48.0 PAROXYSMAL ATRIAL FIBRILLATION (HCC): Chronic | ICD-10-CM

## 2018-08-22 DIAGNOSIS — I10 ESSENTIAL HYPERTENSION, BENIGN: Chronic | ICD-10-CM

## 2018-08-22 DIAGNOSIS — E78.2 MIXED HYPERLIPIDEMIA: Chronic | ICD-10-CM

## 2018-08-22 PROCEDURE — G8399 PT W/DXA RESULTS DOCUMENT: HCPCS | Performed by: NURSE PRACTITIONER

## 2018-08-22 PROCEDURE — G8427 DOCREV CUR MEDS BY ELIG CLIN: HCPCS | Performed by: NURSE PRACTITIONER

## 2018-08-22 PROCEDURE — G8417 CALC BMI ABV UP PARAM F/U: HCPCS | Performed by: NURSE PRACTITIONER

## 2018-08-22 PROCEDURE — 4040F PNEUMOC VAC/ADMIN/RCVD: CPT | Performed by: NURSE PRACTITIONER

## 2018-08-22 PROCEDURE — G8598 ASA/ANTIPLAT THER USED: HCPCS | Performed by: NURSE PRACTITIONER

## 2018-08-22 PROCEDURE — 1090F PRES/ABSN URINE INCON ASSESS: CPT | Performed by: NURSE PRACTITIONER

## 2018-08-22 PROCEDURE — 1101F PT FALLS ASSESS-DOCD LE1/YR: CPT | Performed by: NURSE PRACTITIONER

## 2018-08-22 PROCEDURE — 1036F TOBACCO NON-USER: CPT | Performed by: NURSE PRACTITIONER

## 2018-08-22 PROCEDURE — 99214 OFFICE O/P EST MOD 30 MIN: CPT | Performed by: NURSE PRACTITIONER

## 2018-08-22 PROCEDURE — 1123F ACP DISCUSS/DSCN MKR DOCD: CPT | Performed by: NURSE PRACTITIONER

## 2018-08-22 NOTE — PROGRESS NOTES
[address]     Outpatient Follow Up Note    CHIEF COMPLAINT / HPI:  Follow Up secondary to CAD/ HTN / AFib/ and hyperlipidemia     Olivia Ordaz is 68 y.o. female who presents today for a routine follow up  related to the above mentioned issues. Subjective:   Since the time of last office visit, the patient admits their symptoms have changed. Mrs Sindy Munoz is here in f/u with h/o CAD; HTN and AFib; She also has hyperlipidemia. She has  significant coronary disease with multiple interventions in the past.  Stress test was performed on 5/29/12 that showed normal myocardial perfusion. The echocardiogram of 12/5/11 showed mild left ventricular hypertrophy with normal left ventricular systolic function with estimated ejection fraction at 55%. She had cath 7/12 and started on Ranexa   She had a DONA in July which was stable. She had PFTs which showed severe obstructive disease, she thinks she did get little better with inhaler barby we had given her. Since October 2016  she has had SSCP, was started on Ranexa and has increased it to 1000 mg twice daily and which she continued to have a dull chest pain. On 2/2/17 she had a LHC which showed stable coronary findings. On 8/7/18 patient underwent a unsuccessful  of the RCA. Plan : continue current medications, and if angina persist will PCI SVG to RCA. Today patient is being seen for a routine follow up visit for CAD/ atrial fibrillation/ flutter/ HTN/ and Hyperlipidemia . She is doing well. She denies any chest pain,  palpitations, dizziness, SOB, or edema.       Past Medical History:   Diagnosis Date    Allergic rhinitis, cause unspecified     Arthritis     Atrial fibrillation ([address]     Bronchopneumonia     CAD (coronary artery disease)     stent:  post cataract surgery (CABG)    Cerebral artery occlusion with cerebral infarction (HCC)     TIA    Chronic gouty arthropathy     Chronic kidney disease     Congestive heart failure, unspecified     Degeneration of cervical intervertebral disc     Essential and other specified forms of tremor     Gout     HIGH CHOLESTEROL     History of CVA (cerebrovascular accident)     Hypertension     Influenza 12/23/2017    Mitral valve stenosis and aortic valve insufficiency     Movement disorder     back problems    Peptic ulcer, unspecified site, unspecified as acute or chronic, without mention of hemorrhage, perforation, or obstruction     Thyroid disease     Unspecified disorder of kidney and ureter     Unspecified hypothyroidism      Social History:    History   Smoking Status    Former Smoker    Packs/day: 1.00    Years: 28.00    Types: Cigarettes    Quit date: 1/1/1987   Smokeless Tobacco    Never Used     Comment: H.O.smoking at age 15 / smoked up to 1 p.p.d / quit 1987     Current Medications:  Current Outpatient Prescriptions   Medication Sig Dispense Refill    Cholecalciferol (VITAMIN D3) 46807 units CAPS Take by mouth once a week      Ergocalciferol (VITAMIN D2 PO) Take 50,000 Units by mouth once a week      predniSONE (DELTASONE) 20 MG tablet 1 TID for 3 day then 1 BID 15 tablet 0    Melatonin 5 MG CAPS 1-2 pills nightly 60 capsule 5    isosorbide dinitrate (ISORDIL) 10 MG tablet Take 1 tablet by mouth 3 times daily 90 tablet 0    levothyroxine (SYNTHROID) 112 MCG tablet TAKE 1 TABLET DAILY 90 tablet 0    allopurinol (ZYLOPRIM) 100 MG tablet Take 1 tablet by mouth daily Take along with 300mg tablet for total of 400mg.  90 tablet 3    tiZANidine (ZANAFLEX) 4 MG tablet Take 1 tablet by mouth every 6 hours as needed (prn) 100 tablet 0    zoledronic acid (RECLAST) 5 MG/100ML SOLN Infuse 5 mg intravenously once IV ,yearly      vitamin D (ERGOCALCIFEROL) 37815 units CAPS capsule Take 1 capsule by mouth once a week 12 capsule 1    XARELTO 15 MG TABS tablet TAKE 1 TABLET DAILY WITH BREAKFAST 90 tablet 3    nitroGLYCERIN (NITROLINGUAL) 0.4 MG/SPRAY 0.4 mg spray Place 1 repeat attempt. If she continues to have angina, it is recommended to proceed with the single-vessel SVG to the RCA. Assessment:     Problem: Coronary artery disease  Remote CABG  7/2008 Cath: Patent LIMA to LAD, occluded SVG to RCA with collaterals. Yohannes Mata will continue with her current regime including diet and exercise. .   2/3/17 Cath: which showed 100% prox RCA with bridging collateral, 100% SVG to RCA, 100% prox LAD after D1, D1 stent widely patent, Mild Cx disease, Normal LV FXN--EF 60%--EDP 12 post hydration.      8/7/18:        The PCI of complex proximal RCA chronic total occlusion was unsuccessful (J- score 3). Underwent successful Angioplasty of high-grade proximal RCA lesion proximal to the . RECOMMENDATIONS:  Attempt on this complex long  was unsuccessful. Lack  Of retrograde collaterals along with a very ambiguous cap as well as a large RV marginal branch and bridging collateral coming off at the cap makes it a  challenging repeat attempt. If she continues to have angina, it is recommended to proceed with the single-vessel SVG to the RCA.     Patient denies any anginal symptoms  On Ranexa, Plavix, Norvasc, Isordil and Lopressor , allergic to ASA, patient will not take statins    Plan: continue current medications    Problem: Hypertension:   Today's B/P- 132/66  Stable.   Continue current medications: Lopressor, Lasix, and Norvasc     Problem: Atrial fibrillation/ Atrial Flutter   - Paroxysmal atrial flutter                        - Hx of cardioversion in 2008                        -s/o RFCA for A fib at [facility] by Dr. Prem Wells in 2009                         - S/p DCCV on 8/2/17 by Dr. Annie Andres                        - Currently in regular rhythm       Currently on Xarelto 15 mg daily due to creat clearance 48     Problem: Hyperlipidemia    3/19/18: HDL: 28, LDL: 89   patient has not being taking her Crestor for the last year  Followed per PCP- Patient had her Lipid level drawn yesterday at her

## 2018-09-17 ENCOUNTER — OFFICE VISIT (OUTPATIENT)
Dept: FAMILY MEDICINE CLINIC | Age: 78
End: 2018-09-17

## 2018-09-17 VITALS
HEART RATE: 69 BPM | WEIGHT: 149 LBS | OXYGEN SATURATION: 96 % | TEMPERATURE: 98.1 F | BODY MASS INDEX: 26.39 KG/M2 | DIASTOLIC BLOOD PRESSURE: 74 MMHG | SYSTOLIC BLOOD PRESSURE: 120 MMHG

## 2018-09-17 DIAGNOSIS — I25.82 CHRONIC TOTAL OCCLUSION OF NATIVE CORONARY ARTERY: ICD-10-CM

## 2018-09-17 DIAGNOSIS — R10.9 ABDOMINAL PAIN, UNSPECIFIED ABDOMINAL LOCATION: Primary | ICD-10-CM

## 2018-09-17 DIAGNOSIS — M15.9 PRIMARY OSTEOARTHRITIS INVOLVING MULTIPLE JOINTS: ICD-10-CM

## 2018-09-17 DIAGNOSIS — M54.16 LUMBAR RADICULOPATHY: ICD-10-CM

## 2018-09-17 DIAGNOSIS — G60.9 IDIOPATHIC PERIPHERAL NEUROPATHY: ICD-10-CM

## 2018-09-17 DIAGNOSIS — N30.00 ACUTE CYSTITIS WITHOUT HEMATURIA: ICD-10-CM

## 2018-09-17 DIAGNOSIS — Z23 NEED FOR INFLUENZA VACCINATION: ICD-10-CM

## 2018-09-17 DIAGNOSIS — I25.10 CHRONIC TOTAL OCCLUSION OF NATIVE CORONARY ARTERY: ICD-10-CM

## 2018-09-17 PROBLEM — R06.00 DYSPNEA: Status: RESOLVED | Noted: 2017-12-23 | Resolved: 2018-09-17

## 2018-09-17 PROBLEM — R07.9 CHEST PAIN: Status: RESOLVED | Noted: 2018-03-22 | Resolved: 2018-09-17

## 2018-09-17 PROBLEM — I20.89 ANGINA AT REST: Status: RESOLVED | Noted: 2018-03-22 | Resolved: 2018-09-17

## 2018-09-17 PROBLEM — I20.8 ANGINA AT REST (HCC): Status: RESOLVED | Noted: 2018-03-22 | Resolved: 2018-09-17

## 2018-09-17 LAB
BACTERIA URINE, POC: ABNORMAL
BILIRUBIN URINE: 0 MG/DL
BLOOD, URINE: POSITIVE
CASTS URINE, POC: ABNORMAL
CLARITY: ABNORMAL
COLOR: YELLOW
CRYSTALS URINE, POC: ABNORMAL
EPI CELLS URINE, POC: ABNORMAL
GLUCOSE URINE: ABNORMAL
KETONES, URINE: NEGATIVE
LEUKOCYTE EST, POC: ABNORMAL
NITRITE, URINE: NEGATIVE
PH UA: 6 (ref 4.5–8)
PROTEIN UA: POSITIVE
RBC URINE, POC: ABNORMAL
SPECIFIC GRAVITY UA: 1.01 (ref 1–1.03)
UROBILINOGEN, URINE: NORMAL
WBC URINE, POC: ABNORMAL
YEAST URINE, POC: ABNORMAL

## 2018-09-17 PROCEDURE — 90662 IIV NO PRSV INCREASED AG IM: CPT | Performed by: FAMILY MEDICINE

## 2018-09-17 PROCEDURE — 81000 URINALYSIS NONAUTO W/SCOPE: CPT | Performed by: FAMILY MEDICINE

## 2018-09-17 PROCEDURE — G8427 DOCREV CUR MEDS BY ELIG CLIN: HCPCS | Performed by: FAMILY MEDICINE

## 2018-09-17 PROCEDURE — G8399 PT W/DXA RESULTS DOCUMENT: HCPCS | Performed by: FAMILY MEDICINE

## 2018-09-17 PROCEDURE — 1090F PRES/ABSN URINE INCON ASSESS: CPT | Performed by: FAMILY MEDICINE

## 2018-09-17 PROCEDURE — 4040F PNEUMOC VAC/ADMIN/RCVD: CPT | Performed by: FAMILY MEDICINE

## 2018-09-17 PROCEDURE — G8417 CALC BMI ABV UP PARAM F/U: HCPCS | Performed by: FAMILY MEDICINE

## 2018-09-17 PROCEDURE — G8598 ASA/ANTIPLAT THER USED: HCPCS | Performed by: FAMILY MEDICINE

## 2018-09-17 PROCEDURE — 99214 OFFICE O/P EST MOD 30 MIN: CPT | Performed by: FAMILY MEDICINE

## 2018-09-17 PROCEDURE — 1036F TOBACCO NON-USER: CPT | Performed by: FAMILY MEDICINE

## 2018-09-17 PROCEDURE — G0008 ADMIN INFLUENZA VIRUS VAC: HCPCS | Performed by: FAMILY MEDICINE

## 2018-09-17 PROCEDURE — 1123F ACP DISCUSS/DSCN MKR DOCD: CPT | Performed by: FAMILY MEDICINE

## 2018-09-17 PROCEDURE — 1101F PT FALLS ASSESS-DOCD LE1/YR: CPT | Performed by: FAMILY MEDICINE

## 2018-09-17 RX ORDER — PREDNISONE 20 MG/1
TABLET ORAL
Qty: 18 TABLET | Refills: 0 | Status: SHIPPED | OUTPATIENT
Start: 2018-09-17 | End: 2018-10-03

## 2018-09-17 RX ORDER — NITROFURANTOIN 25; 75 MG/1; MG/1
100 CAPSULE ORAL 2 TIMES DAILY
Qty: 10 CAPSULE | Refills: 0 | Status: SHIPPED | OUTPATIENT
Start: 2018-09-17 | End: 2018-09-22

## 2018-09-17 NOTE — PROGRESS NOTES
Vaccine Information Sheet, \"Influenza - Inactivated\"  given to Gosia Pineda, or parent/legal guardian of  Gosia Pineda and verbalized understanding. Patient responses:    Have you ever had a reaction to a flu vaccine? No  Are you able to eat eggs without adverse effects? Yes  Do you have any current illness? No  Have you ever had Guillian [place] Syndrome? No    Flu vaccine given per order. Please see immunization tab.     Immunization(s) given during visit:     Immunizations     Name Date Dose Route    Influenza, High Dose (Fluzone 65 yrs and older) 9/17/2018 0.5 mL Intramuscular    Site: Deltoid- Left    Lot: [id number]    NDC: 61748-741-91

## 2018-09-17 NOTE — PROGRESS NOTES
Subjective:      Patient ID: Vanessa Wilburn is a 68 y.o. female. CC: Patient presents for re-evaluation of chronic health problems including abdominal pain and urinary frequency, persistent sciatica symptoms of right leg, neuropathy symptoms of both feet and heart disease. HPI  Patient is here for a one month follow-up in accompaniment of . Also patient has been having issues with her weight and can't get it to go down. Patient's weight has been stable but she thought it should go down more so. She is still having discomfort in her back will radiate down to her right leg which did improve to some extent when she was on his own but has since returned. Patient is also having urinary pressure, abdominal pain, and trouble urinating for about one week. Patient denies any chills or fever with urinary symptoms. Patient is having increasing neuropathy symptoms in both her feet left greater than right for the last several months. She's had no investigation of this other than thyroid function. Patient denies having any chest pain or shortness breath symptoms. She was reevaluated by cardiology and placed on long-acting nitrate medication which she states she cannot take.     Review of Systems     Patient Active Problem List   Diagnosis    Paroxysmal atrial fibrillation (HCC)    Essential hypertension, benign    Mixed hyperlipidemia    Chronic gouty arthropathy    Hypertension    Acquired hypothyroidism    Arthritis    Heart valve problem    Restrictive lung disease    Sick sinus syndrome (HCC)    Allergic rhinitis    Fibrocystic breast    Cerebrovascular accident (CVA) ([address]    Pacemaker    Typical atrial flutter (HCC)    Primary osteoarthritis involving multiple joints    Vitamin D deficiency    Tremor    Chronic total occlusion of native coronary artery    Age related osteoporosis    CAD S/P percutaneous coronary angioplasty       Outpatient Prescriptions Marked as Taking for the PREFILL SYR, 0.5ML (FLUZONE HD)    Acute cystitis without hematuria    Idiopathic peripheral neuropathy    Lumbar radiculopathy    Primary osteoarthritis involving multiple joints    Chronic total occlusion of native coronary artery    Other orders  -     nitrofurantoin, macrocrystal-monohydrate, (MACROBID) 100 MG capsule; Take 1 capsule by mouth 2 times daily for 5 days  -     predniSONE (DELTASONE) 20 MG tablet; 1 TID for 3 day then 1 BID for 3 days then 1 qd    OARRS report checked          Plan:      Laboratory testing ordered in regards to peripheral neuropathy and thyroid disorder. No change current medical management until laboratory profile is available  Begin a longer prednisone burst for lumbar radiculopathy as she did have some improvement initially. Discussed the importance of diet and activities as tolerated  RTC 3 months    Please note that this chart was generated using Dragon dictation software. Although every effort was made to ensure the accuracy of this automated transcription, some errors in transcription may have occurred.

## 2018-09-18 DIAGNOSIS — M15.9 PRIMARY OSTEOARTHRITIS INVOLVING MULTIPLE JOINTS: ICD-10-CM

## 2018-09-19 RX ORDER — HYDROCODONE BITARTRATE AND ACETAMINOPHEN 5; 325 MG/1; MG/1
1-2 TABLET ORAL EVERY 4 HOURS PRN
Qty: 150 TABLET | Refills: 0 | Status: SHIPPED | OUTPATIENT
Start: 2018-09-19 | End: 2018-12-28 | Stop reason: SDUPTHER

## 2018-10-03 ENCOUNTER — OFFICE VISIT (OUTPATIENT)
Dept: ENDOCRINOLOGY | Age: 78
End: 2018-10-03
Payer: MEDICARE

## 2018-10-03 ENCOUNTER — HOSPITAL ENCOUNTER (OUTPATIENT)
Age: 78
Discharge: HOME | End: 2018-10-03
Payer: MEDICARE

## 2018-10-03 VITALS
HEART RATE: 69 BPM | WEIGHT: 149 LBS | SYSTOLIC BLOOD PRESSURE: 148 MMHG | DIASTOLIC BLOOD PRESSURE: 62 MMHG | OXYGEN SATURATION: 99 % | BODY MASS INDEX: 26.4 KG/M2 | HEIGHT: 63 IN

## 2018-10-03 DIAGNOSIS — E03.9 ACQUIRED HYPOTHYROIDISM: Primary | ICD-10-CM

## 2018-10-03 DIAGNOSIS — Z98.61 CAD S/P PERCUTANEOUS CORONARY ANGIOPLASTY: ICD-10-CM

## 2018-10-03 DIAGNOSIS — I25.10 CAD S/P PERCUTANEOUS CORONARY ANGIOPLASTY: ICD-10-CM

## 2018-10-03 DIAGNOSIS — E07.9 DISORDER OF THYROID: ICD-10-CM

## 2018-10-03 DIAGNOSIS — M81.0 AGE-RELATED OSTEOPOROSIS WITHOUT CURRENT PATHOLOGICAL FRACTURE: ICD-10-CM

## 2018-10-03 DIAGNOSIS — I10 ESSENTIAL HYPERTENSION, BENIGN: Chronic | ICD-10-CM

## 2018-10-03 DIAGNOSIS — E03.9 ACQUIRED HYPOTHYROIDISM: ICD-10-CM

## 2018-10-03 LAB
ANTI-THYROGLOB ABS: 69 IU/ML
FOLATE: 13.51 NG/ML (ref 4.78–24.2)
T4 TOTAL: 7.3 UG/DL (ref 4.5–10.9)
THYROID PEROXIDASE (TPO) ABS: 386 IU/ML
TSH SERPL DL<=0.05 MIU/L-ACNC: 1.37 UIU/ML (ref 0.27–4.2)
VITAMIN B-12: 534 PG/ML (ref 211–911)

## 2018-10-03 PROCEDURE — 84436 ASSAY OF TOTAL THYROXINE: CPT

## 2018-10-03 PROCEDURE — 1123F ACP DISCUSS/DSCN MKR DOCD: CPT | Performed by: INTERNAL MEDICINE

## 2018-10-03 PROCEDURE — 84443 ASSAY THYROID STIM HORMONE: CPT

## 2018-10-03 PROCEDURE — 86376 MICROSOMAL ANTIBODY EACH: CPT

## 2018-10-03 PROCEDURE — G8399 PT W/DXA RESULTS DOCUMENT: HCPCS | Performed by: INTERNAL MEDICINE

## 2018-10-03 PROCEDURE — 36415 COLL VENOUS BLD VENIPUNCTURE: CPT

## 2018-10-03 PROCEDURE — G8598 ASA/ANTIPLAT THER USED: HCPCS | Performed by: INTERNAL MEDICINE

## 2018-10-03 PROCEDURE — 1090F PRES/ABSN URINE INCON ASSESS: CPT | Performed by: INTERNAL MEDICINE

## 2018-10-03 PROCEDURE — G8417 CALC BMI ABV UP PARAM F/U: HCPCS | Performed by: INTERNAL MEDICINE

## 2018-10-03 PROCEDURE — 82607 VITAMIN B-12: CPT

## 2018-10-03 PROCEDURE — G8482 FLU IMMUNIZE ORDER/ADMIN: HCPCS | Performed by: INTERNAL MEDICINE

## 2018-10-03 PROCEDURE — G8427 DOCREV CUR MEDS BY ELIG CLIN: HCPCS | Performed by: INTERNAL MEDICINE

## 2018-10-03 PROCEDURE — 99204 OFFICE O/P NEW MOD 45 MIN: CPT | Performed by: INTERNAL MEDICINE

## 2018-10-03 PROCEDURE — 1101F PT FALLS ASSESS-DOCD LE1/YR: CPT | Performed by: INTERNAL MEDICINE

## 2018-10-03 PROCEDURE — 1036F TOBACCO NON-USER: CPT | Performed by: INTERNAL MEDICINE

## 2018-10-03 PROCEDURE — 82746 ASSAY OF FOLIC ACID SERUM: CPT

## 2018-10-03 PROCEDURE — 86800 THYROGLOBULIN ANTIBODY: CPT

## 2018-10-03 PROCEDURE — 4040F PNEUMOC VAC/ADMIN/RCVD: CPT | Performed by: INTERNAL MEDICINE

## 2018-10-03 NOTE — PROGRESS NOTES
SUBJECTIVE:  Shahida Mcfadden is a 68 y.o. female who is seen  here for hypothyroidism. Patient was diagnosed with Hypothyroidism approximately at age 52   Current complaints: fatigue, weight gain  History of obstructive symptoms: difficulty swallowing No, changes in voice/hoarseness   She has been taking Lt4 since 1987 and her dose has been adjusted in the past   History of radiation to patient's neck: NO  Recent iodine exposure: No  Family history includes no thyroid abnormalities.   Family history of thyroid cancer: No    She has CAD which was diagnosed at Age 52 and Had CABG in 1987   She also has hypertension and is on medication   She has osteoporosis and is on Reclast as per Rheumatologist       Past Medical History:   Diagnosis Date    Allergic rhinitis, cause unspecified     Arthritis     Atrial fibrillation ([address]     Bronchopneumonia     CAD (coronary artery disease)     stent:  post cataract surgery (CABG)    Cerebral artery occlusion with cerebral infarction ([address]     TIA    Chronic gouty arthropathy     Chronic kidney disease     Congestive heart failure, unspecified     Degeneration of cervical intervertebral disc     Essential and other specified forms of tremor     Gout     HIGH CHOLESTEROL     History of CVA (cerebrovascular accident)     Hypertension     Influenza 12/23/2017    Mitral valve stenosis and aortic valve insufficiency     Movement disorder     back problems    Peptic ulcer, unspecified site, unspecified as acute or chronic, without mention of hemorrhage, perforation, or obstruction     Thyroid disease     Unspecified disorder of kidney and ureter     Unspecified hypothyroidism      Patient Active Problem List    Diagnosis Date Noted    Typical atrial flutter ([address] 08/02/2017     Priority: High    Hypertension      Priority: Low    Paroxysmal atrial fibrillation ([address] 05/16/2011     Priority: Low    CAD S/P percutaneous coronary angioplasty     Age related for atleast 6 weeks As her dose was reduced after her last TSH in July she will get thyroid function test drawn today and I will adjust the dose based on available labs  She feels that she should be on higher doses I discussed with her that her levels are hyperthyroid and she probably will need further lowering of her Thyroid medication   Risks associated with over suppression discussed in detail she was advised of possible worsening of osteoporosis as well as irregularity in heart rate could be due to excessive amount of thyroid hormone replacement    - T4, Free; Future  - TSH without Reflex; Future  - Thyroid Peroxidase Antibody; Future  - Anti-Thyroglobulin Antibody; Future    2. Age-related osteoporosis without current pathological fracture  Follows with Rheumatologist   Her kast DEXa scan was osteopenic range worst T score -2.3 T score   - T4, Free; Future  - TSH without Reflex; Future  - Thyroid Peroxidase Antibody; Future  - Anti-Thyroglobulin Antibody; Future    3. CAD S/P percutaneous coronary angioplasty  Follows [facility] cardiology     5. Essential hypertension, benign  Controlled at home   Lisa Raoms denies any chest pain, denies any Shortness Of Breath  she is advised to go the Er if  she develops any of these signs or symptoms. Reviewed and/or ordered clinical lab results Yes  Reviewed and/or ordered radiology tests Yes   Reviewed and/or ordered other diagnostic tests Yes  Made a decision to obtain old records Yes  Reviewed and summarized old records Yes      Lisa Ramos was counseled regarding symptoms of current diagnosis, course and complications of disease if inadequately treated, side effects of medications, diagnosis, treatment options, and prognosis, risks, benefits, complications, and alternatives of treatment, labs, imaging and other studies and treatment targets and goals. She understands instructions and counseling. Return in about 6 months (around 4/3/2019).     Please note

## 2018-10-12 ENCOUNTER — TELEPHONE (OUTPATIENT)
Dept: FAMILY MEDICINE CLINIC | Age: 78
End: 2018-10-12

## 2018-10-12 RX ORDER — LEVOTHYROXINE SODIUM 112 UG/1
TABLET ORAL
Qty: 90 TABLET | Refills: 1 | Status: SHIPPED | OUTPATIENT
Start: 2018-10-12 | End: 2019-04-18 | Stop reason: SDUPTHER

## 2018-10-15 ENCOUNTER — TELEPHONE (OUTPATIENT)
Dept: FAMILY MEDICINE CLINIC | Age: 78
End: 2018-10-15

## 2018-10-19 ENCOUNTER — TELEPHONE (OUTPATIENT)
Dept: FAMILY MEDICINE CLINIC | Age: 78
End: 2018-10-19

## 2018-10-19 ENCOUNTER — NURSE ONLY (OUTPATIENT)
Dept: FAMILY MEDICINE CLINIC | Age: 78
End: 2018-10-19
Payer: MEDICARE

## 2018-10-19 DIAGNOSIS — N39.0 URINARY TRACT INFECTION WITHOUT HEMATURIA, SITE UNSPECIFIED: ICD-10-CM

## 2018-10-19 DIAGNOSIS — N39.0 URINARY TRACT INFECTION WITHOUT HEMATURIA, SITE UNSPECIFIED: Primary | ICD-10-CM

## 2018-10-19 LAB
BACTERIA URINE, POC: ABNORMAL
BILIRUBIN URINE: ABNORMAL MG/DL
BLOOD, URINE: POSITIVE
CASTS URINE, POC: NEGATIVE
CLARITY: ABNORMAL
COLOR: YELLOW
CRYSTALS URINE, POC: NEGATIVE
EPI CELLS URINE, POC: ABNORMAL
GLUCOSE URINE: NEGATIVE
KETONES, URINE: NEGATIVE
LEUKOCYTE EST, POC: ABNORMAL
NITRITE, URINE: NEGATIVE
PH UA: 5.5 (ref 4.5–8)
PROTEIN UA: ABNORMAL
RBC URINE, POC: NEGATIVE
SPECIFIC GRAVITY UA: 1.02 (ref 1–1.03)
UROBILINOGEN, URINE: NORMAL
WBC URINE, POC: ABNORMAL
YEAST URINE, POC: NEGATIVE

## 2018-10-19 PROCEDURE — 81000 URINALYSIS NONAUTO W/SCOPE: CPT | Performed by: FAMILY MEDICINE

## 2018-10-19 RX ORDER — NITROFURANTOIN 25; 75 MG/1; MG/1
100 CAPSULE ORAL 2 TIMES DAILY
Qty: 10 CAPSULE | Refills: 0 | Status: SHIPPED | OUTPATIENT
Start: 2018-10-19 | End: 2018-10-24

## 2018-10-19 RX ORDER — NITROFURANTOIN 25; 75 MG/1; MG/1
100 CAPSULE ORAL 2 TIMES DAILY
Qty: 10 CAPSULE | Refills: 0 | Status: SHIPPED | OUTPATIENT
Start: 2018-10-19 | End: 2018-10-19 | Stop reason: SDUPTHER

## 2018-10-19 NOTE — PROGRESS NOTES
Patient came in today for UA on nurse visit. Urine sample was obtained and UA with microscopic was ran. Per WM, send urine out for culture, and start patient on Macrobid 100 mg BID x 5 days. Patient advised and pharmacy confirmed.

## 2018-10-19 NOTE — TELEPHONE ENCOUNTER
nitrofurantoin, macrocrystal-monohydrate, (MACROBID) 100 MG capsule 10 capsule 0 10/19/2018 10/24/2018    Sig - Route:  Take 1 capsule by mouth 2 times daily for 5 days - Oral    Sent to pharmacy as: Nitrofurantoin Monohyd Macro 100 MG Oral Capsule      [facility]    Please cancel and resend to [facility] on [address] in chart

## 2018-10-21 LAB
ORGANISM: ABNORMAL
URINE CULTURE, ROUTINE: ABNORMAL
URINE CULTURE, ROUTINE: ABNORMAL

## 2018-10-24 ENCOUNTER — OFFICE VISIT (OUTPATIENT)
Dept: RHEUMATOLOGY | Age: 78
End: 2018-10-24
Payer: MEDICARE

## 2018-10-24 VITALS
WEIGHT: 147.8 LBS | DIASTOLIC BLOOD PRESSURE: 72 MMHG | SYSTOLIC BLOOD PRESSURE: 110 MMHG | BODY MASS INDEX: 26.18 KG/M2 | HEART RATE: 74 BPM

## 2018-10-24 DIAGNOSIS — Z79.899 ENCOUNTER FOR LONG-TERM (CURRENT) USE OF MEDICATIONS: ICD-10-CM

## 2018-10-24 DIAGNOSIS — M1A.00X0 IDIOPATHIC CHRONIC GOUT WITHOUT TOPHUS, UNSPECIFIED SITE: Primary | ICD-10-CM

## 2018-10-24 LAB
ALBUMIN SERPL-MCNC: 3.7 G/DL (ref 3.4–5)
ALP BLD-CCNC: 70 U/L (ref 40–129)
ALT SERPL-CCNC: 14 U/L (ref 10–40)
AST SERPL-CCNC: 16 U/L (ref 15–37)
BILIRUB SERPL-MCNC: 0.4 MG/DL (ref 0–1)
BILIRUBIN DIRECT: <0.2 MG/DL (ref 0–0.3)
BILIRUBIN, INDIRECT: ABNORMAL MG/DL (ref 0–1)
CREAT SERPL-MCNC: 1.2 MG/DL (ref 0.6–1.2)
GFR AFRICAN AMERICAN: 53
GFR NON-AFRICAN AMERICAN: 43
TOTAL PROTEIN: 6.3 G/DL (ref 6.4–8.2)
URIC ACID, SERUM: 4.5 MG/DL (ref 2.6–6)

## 2018-10-24 PROCEDURE — 1090F PRES/ABSN URINE INCON ASSESS: CPT | Performed by: INTERNAL MEDICINE

## 2018-10-24 PROCEDURE — 4040F PNEUMOC VAC/ADMIN/RCVD: CPT | Performed by: INTERNAL MEDICINE

## 2018-10-24 PROCEDURE — G8482 FLU IMMUNIZE ORDER/ADMIN: HCPCS | Performed by: INTERNAL MEDICINE

## 2018-10-24 PROCEDURE — 1123F ACP DISCUSS/DSCN MKR DOCD: CPT | Performed by: INTERNAL MEDICINE

## 2018-10-24 PROCEDURE — 1101F PT FALLS ASSESS-DOCD LE1/YR: CPT | Performed by: INTERNAL MEDICINE

## 2018-10-24 PROCEDURE — G8427 DOCREV CUR MEDS BY ELIG CLIN: HCPCS | Performed by: INTERNAL MEDICINE

## 2018-10-24 PROCEDURE — G8417 CALC BMI ABV UP PARAM F/U: HCPCS | Performed by: INTERNAL MEDICINE

## 2018-10-24 PROCEDURE — 1036F TOBACCO NON-USER: CPT | Performed by: INTERNAL MEDICINE

## 2018-10-24 PROCEDURE — 99213 OFFICE O/P EST LOW 20 MIN: CPT | Performed by: INTERNAL MEDICINE

## 2018-10-24 PROCEDURE — G8399 PT W/DXA RESULTS DOCUMENT: HCPCS | Performed by: INTERNAL MEDICINE

## 2018-10-24 PROCEDURE — G8598 ASA/ANTIPLAT THER USED: HCPCS | Performed by: INTERNAL MEDICINE

## 2018-10-24 RX ORDER — ALLOPURINOL 300 MG/1
300 TABLET ORAL DAILY
COMMUNITY
End: 2019-04-18

## 2018-10-30 ENCOUNTER — TELEPHONE (OUTPATIENT)
Dept: FAMILY MEDICINE CLINIC | Age: 78
End: 2018-10-30

## 2018-10-30 ENCOUNTER — HOSPITAL ENCOUNTER (OUTPATIENT)
Age: 78
Discharge: HOME | End: 2018-10-30
Payer: MEDICARE

## 2018-10-30 ENCOUNTER — HOSPITAL ENCOUNTER (OUTPATIENT)
Dept: GENERAL RADIOLOGY | Age: 78
Discharge: HOME | End: 2018-10-30
Payer: MEDICARE

## 2018-10-30 ENCOUNTER — OFFICE VISIT (OUTPATIENT)
Dept: FAMILY MEDICINE CLINIC | Age: 78
End: 2018-10-30
Payer: MEDICARE

## 2018-10-30 VITALS
DIASTOLIC BLOOD PRESSURE: 80 MMHG | HEART RATE: 70 BPM | WEIGHT: 147.5 LBS | SYSTOLIC BLOOD PRESSURE: 132 MMHG | BODY MASS INDEX: 26.13 KG/M2 | OXYGEN SATURATION: 97 %

## 2018-10-30 DIAGNOSIS — M54.16 LUMBAR RADICULOPATHY: Primary | ICD-10-CM

## 2018-10-30 DIAGNOSIS — R52 PAIN: ICD-10-CM

## 2018-10-30 PROCEDURE — G8399 PT W/DXA RESULTS DOCUMENT: HCPCS | Performed by: FAMILY MEDICINE

## 2018-10-30 PROCEDURE — 1036F TOBACCO NON-USER: CPT | Performed by: FAMILY MEDICINE

## 2018-10-30 PROCEDURE — 1101F PT FALLS ASSESS-DOCD LE1/YR: CPT | Performed by: FAMILY MEDICINE

## 2018-10-30 PROCEDURE — 99214 OFFICE O/P EST MOD 30 MIN: CPT | Performed by: FAMILY MEDICINE

## 2018-10-30 PROCEDURE — 72100 X-RAY EXAM L-S SPINE 2/3 VWS: CPT

## 2018-10-30 PROCEDURE — G8417 CALC BMI ABV UP PARAM F/U: HCPCS | Performed by: FAMILY MEDICINE

## 2018-10-30 PROCEDURE — G8482 FLU IMMUNIZE ORDER/ADMIN: HCPCS | Performed by: FAMILY MEDICINE

## 2018-10-30 PROCEDURE — 4040F PNEUMOC VAC/ADMIN/RCVD: CPT | Performed by: FAMILY MEDICINE

## 2018-10-30 PROCEDURE — G8598 ASA/ANTIPLAT THER USED: HCPCS | Performed by: FAMILY MEDICINE

## 2018-10-30 PROCEDURE — 1090F PRES/ABSN URINE INCON ASSESS: CPT | Performed by: FAMILY MEDICINE

## 2018-10-30 PROCEDURE — 1123F ACP DISCUSS/DSCN MKR DOCD: CPT | Performed by: FAMILY MEDICINE

## 2018-10-30 PROCEDURE — G8428 CUR MEDS NOT DOCUMENT: HCPCS | Performed by: FAMILY MEDICINE

## 2018-10-30 RX ORDER — CELECOXIB 200 MG/1
200 CAPSULE ORAL DAILY
Qty: 30 CAPSULE | Refills: 0 | Status: SHIPPED | OUTPATIENT
Start: 2018-10-30 | End: 2019-04-18

## 2018-10-30 NOTE — PROGRESS NOTES
headaches    Sulfa Antibiotics Rash     Objective:   Physical Exam   Constitutional: She is oriented to person, place, and time. She appears well-developed and well-nourished. She is cooperative. No distress. Musculoskeletal:        Lumbar back: She exhibits no swelling, no edema and no deformity. Right upper leg: She exhibits no tenderness, no swelling and no deformity. Left upper leg: She exhibits no tenderness, no swelling and no deformity. Neurological: She is alert and oriented to person, place, and time. No sensory deficit. Reflex Scores:       Patellar reflexes are 2+ on the right side and 2+ on the left side. Achilles reflexes are 2+ on the right side and 2+ on the left side. Skin: Skin is warm. No rash noted. No erythema. Back Exam     Tenderness   The patient is experiencing no tenderness. Range of Motion   Extension: abnormal   Flexion: normal   Lateral Bend Right: abnormal   Lateral Bend Left: normal     Muscle Strength   Right Quadriceps:  5/5/5   Left Quadriceps:  5/5/5   Right Hamstrings:  5/5/5   Left Hamstrings:  5/5/5     Tests   Straight leg raise right: negative  Straight leg raise left: negative    Other   Gait: normal             Assessment:      Ruy Guerrero was seen today for leg pain. Diagnoses and all orders for this visit:    Lumbar radiculopathy    Other orders  -     celecoxib (CELEBREX) 200 MG capsule; Take 1 capsule by mouth daily            Plan: We will proceed with a plain x-ray of the lumbar spine. Discussed with patient that she does have a fracture this will need to be further investigated and discussed possible kyphoplasty  If her x-ray is normal I believe we should proceed with a CT scan of the lumbar spine as she's unable to have an MRI scan with cardiac pacemaker in place  We discussed the pros and cons of anti-inflammatory medications in regards to her heart disease and patient is going to closely monitor her weight.   I advised her she

## 2018-11-09 ENCOUNTER — HOSPITAL ENCOUNTER (OUTPATIENT)
Dept: CT IMAGING | Age: 78
Discharge: HOME | End: 2018-11-09
Payer: MEDICARE

## 2018-11-09 DIAGNOSIS — M54.16 LUMBAR RADICULOPATHY: ICD-10-CM

## 2018-11-09 PROCEDURE — 72131 CT LUMBAR SPINE W/O DYE: CPT

## 2018-11-12 ENCOUNTER — TELEPHONE (OUTPATIENT)
Dept: CARDIOLOGY CLINIC | Age: 78
End: 2018-11-12

## 2018-11-12 DIAGNOSIS — M51.36 BULGING LUMBAR DISC: Primary | ICD-10-CM

## 2018-11-12 DIAGNOSIS — M47.816 OSTEOARTHRITIS OF LUMBAR SPINE, UNSPECIFIED SPINAL OSTEOARTHRITIS COMPLICATION STATUS: ICD-10-CM

## 2018-11-12 NOTE — TELEPHONE ENCOUNTER
Assessment:     Problem: Coronary artery disease  Remote CABG  7/2008 Cath: Patent LIMA to LAD, occluded SVG to RCA with collaterals. Zac Shore will continue with her current regime including diet and exercise. .   2/3/17 Cath: which showed 100% prox RCA with bridging collateral, 100% SVG to RCA, 100% prox LAD after D1, D1 stent widely patent, Mild Cx disease, Normal LV FXN--EF 60%--EDP 12 post hydration.      8/7/18:        The PCI of complex proximal RCA chronic total occlusion was unsuccessful (J- score 3). Underwent successful Angioplasty of high-grade proximal RCA lesion proximal to the . RECOMMENDATIONS:  Attempt on this complex long  was unsuccessful. Lack  Of retrograde collaterals along with a very ambiguous cap as well as a large RV marginal branch and bridging collateral coming off at the cap makes it a  challenging repeat attempt. If she continues to have angina, it is recommended to proceed with the single-vessel SVG to the RCA.     Patient denies any anginal symptoms  On Ranexa, Plavix, Norvasc, Isordil and Lopressor , allergic to ASA, patient will not take statins    Plan: continue current medications    Problem: Hypertension:   Today's B/P- 132/66  Stable. Continue current medications: Lopressor, Lasix, and Norvasc     Problem: Atrial fibrillation/ Atrial Flutter   - Paroxysmal atrial flutter                        - Hx of cardioversion in 2008                        -s/o RFCA for A fib at [facility] by Dr. Dede Hdez in 2009                         - S/p DCCV on 8/2/17 by Dr. Rolando Luo                        - Currently in regular rhythm       Currently on Xarelto 15 mg daily due to creat clearance 48     Problem: Hyperlipidemia    3/19/18: HDL: 28, LDL: 89   patient has not being taking her Crestor for the last year  Followed per PCP- Patient had her Lipid level drawn yesterday at her PCP office.     PFT showed severe obstructive disease,    Followed per DR. Lee        Plan:   Continue current

## 2018-11-12 NOTE — TELEPHONE ENCOUNTER
Has had a terrible headache for the past 3 hours (almost like a migraine) . She checked her b/p and it was 198/88 and 188/88. Should she take an extra b/p pill ?

## 2018-11-13 ENCOUNTER — OFFICE VISIT (OUTPATIENT)
Dept: CARDIOLOGY CLINIC | Age: 78
End: 2018-11-13
Payer: MEDICARE

## 2018-11-13 ENCOUNTER — PROCEDURE VISIT (OUTPATIENT)
Dept: CARDIOLOGY CLINIC | Age: 78
End: 2018-11-13
Payer: MEDICARE

## 2018-11-13 VITALS
WEIGHT: 146 LBS | DIASTOLIC BLOOD PRESSURE: 88 MMHG | SYSTOLIC BLOOD PRESSURE: 200 MMHG | HEIGHT: 63 IN | BODY MASS INDEX: 25.87 KG/M2

## 2018-11-13 DIAGNOSIS — E78.2 MIXED HYPERLIPIDEMIA: ICD-10-CM

## 2018-11-13 DIAGNOSIS — I48.3 TYPICAL ATRIAL FLUTTER (HCC): ICD-10-CM

## 2018-11-13 DIAGNOSIS — I25.119 CORONARY ARTERY DISEASE INVOLVING NATIVE HEART WITH ANGINA PECTORIS, UNSPECIFIED VESSEL OR LESION TYPE (HCC): ICD-10-CM

## 2018-11-13 DIAGNOSIS — I10 ESSENTIAL HYPERTENSION: ICD-10-CM

## 2018-11-13 DIAGNOSIS — I48.0 PAROXYSMAL ATRIAL FIBRILLATION (HCC): Primary | ICD-10-CM

## 2018-11-13 DIAGNOSIS — I49.5 SICK SINUS SYNDROME (HCC): ICD-10-CM

## 2018-11-13 DIAGNOSIS — Z95.0 PACEMAKER: ICD-10-CM

## 2018-11-13 DIAGNOSIS — I10 ESSENTIAL HYPERTENSION, BENIGN: ICD-10-CM

## 2018-11-13 DIAGNOSIS — I48.0 PAROXYSMAL ATRIAL FIBRILLATION (HCC): Chronic | ICD-10-CM

## 2018-11-13 PROCEDURE — G8417 CALC BMI ABV UP PARAM F/U: HCPCS | Performed by: INTERNAL MEDICINE

## 2018-11-13 PROCEDURE — 1123F ACP DISCUSS/DSCN MKR DOCD: CPT | Performed by: INTERNAL MEDICINE

## 2018-11-13 PROCEDURE — G8427 DOCREV CUR MEDS BY ELIG CLIN: HCPCS | Performed by: INTERNAL MEDICINE

## 2018-11-13 PROCEDURE — 1036F TOBACCO NON-USER: CPT | Performed by: INTERNAL MEDICINE

## 2018-11-13 PROCEDURE — 1090F PRES/ABSN URINE INCON ASSESS: CPT | Performed by: INTERNAL MEDICINE

## 2018-11-13 PROCEDURE — G8598 ASA/ANTIPLAT THER USED: HCPCS | Performed by: INTERNAL MEDICINE

## 2018-11-13 PROCEDURE — 99214 OFFICE O/P EST MOD 30 MIN: CPT | Performed by: INTERNAL MEDICINE

## 2018-11-13 PROCEDURE — G8399 PT W/DXA RESULTS DOCUMENT: HCPCS | Performed by: INTERNAL MEDICINE

## 2018-11-13 PROCEDURE — 93280 PM DEVICE PROGR EVAL DUAL: CPT | Performed by: INTERNAL MEDICINE

## 2018-11-13 PROCEDURE — 93000 ELECTROCARDIOGRAM COMPLETE: CPT | Performed by: INTERNAL MEDICINE

## 2018-11-13 PROCEDURE — 1101F PT FALLS ASSESS-DOCD LE1/YR: CPT | Performed by: INTERNAL MEDICINE

## 2018-11-13 PROCEDURE — G8482 FLU IMMUNIZE ORDER/ADMIN: HCPCS | Performed by: INTERNAL MEDICINE

## 2018-11-13 PROCEDURE — 4040F PNEUMOC VAC/ADMIN/RCVD: CPT | Performed by: INTERNAL MEDICINE

## 2018-11-13 RX ORDER — HYDROCHLOROTHIAZIDE 25 MG/1
25 TABLET ORAL EVERY MORNING
Qty: 90 TABLET | Refills: 3 | Status: SHIPPED | OUTPATIENT
Start: 2018-11-13 | End: 2018-12-28

## 2018-11-13 RX ORDER — MECLIZINE HCL 12.5 MG/1
12.5 TABLET ORAL 3 TIMES DAILY PRN
COMMUNITY
End: 2019-07-23 | Stop reason: SDUPTHER

## 2018-11-13 NOTE — PROGRESS NOTES
interrogation 18  AP 39   54  AT/PAF     EC18  Electronic atrial pacemaker   -Left atrial enlargement. Voltage criteria for LVH  (R(V5) exceeds 2.60 mV). -Anteroseptal infarct -age undetermined.    -Nonspecific ST depression  -Seen with left ventricular hypertrophy (strain) or digitalis effect. QTc 441    Echo: 16 ([facility]rium)  Conclusions: Normal LV size and systolic function Mild to moderate mitral  regurgitation Mild tricuspid regurgitation  Findings:   Left Atrium: Mild to moderate enlargement of the left atrium. Left Ventricle: Upper limits of normal left ventricle size. No left ventricular  hypertrophy. Normal left ventricular systolic function. The ejection fraction   Is visually estimated to be 55 %. Right Atrium: Normal right atrial size. RightVentricle: Normal right ventricle size. Normal right ventricular function. Aortic Valve: Tri-leaflet aortic valve. Mild aortic cusp calcification. No  aortic valve stenosis. Mild (1+) aortic valve regurgitation. Aorta: No dilatation of the aortic root. IVC/PA: Normal IVC size and normal respiratory  collapse consistent with normal right atrial pressure. Mitral Valve: Mild mitral valve leaflet calcification. Mild to moderate (2+) mitral valve  regurgitation. No mitral valve stenosis. Tricuspid Valve: Mild (1+) tricuspid  regurgitation. The pulmonary artery pressure is normal.   Pulmonic Valve: Mild  pulmonic regurgitation. Pericardium: Normal pericardium with no significant  pericardial effusion.     Nuc stress 16: ([facility])   Final Conclusions/Summary  Stress ECG Impressions: No significant ST changes during vasodilator infusion  Summary: - Abnormal moderat risk stress test with moderate size and severity  anterolateral wall ischemia - Normal LV size and systolic function    Cath: 2/3/17 Cath: which showed 100% prox RCA with bridging collateral, 100% SVG to RCA, 100% prox LAD after D1, D1 stent widely patent, Mild Cx

## 2018-11-19 ENCOUNTER — OFFICE VISIT (OUTPATIENT)
Dept: ORTHOPEDIC SURGERY | Age: 78
End: 2018-11-19
Payer: MEDICARE

## 2018-11-19 VITALS
BODY MASS INDEX: 25.86 KG/M2 | WEIGHT: 145.94 LBS | DIASTOLIC BLOOD PRESSURE: 80 MMHG | SYSTOLIC BLOOD PRESSURE: 132 MMHG | HEIGHT: 63 IN

## 2018-11-19 DIAGNOSIS — M54.16 LUMBAR RADICULOPATHY: ICD-10-CM

## 2018-11-19 DIAGNOSIS — M48.061 LUMBAR FORAMINAL STENOSIS: ICD-10-CM

## 2018-11-19 DIAGNOSIS — M48.062 SPINAL STENOSIS OF LUMBAR REGION WITH NEUROGENIC CLAUDICATION: Primary | ICD-10-CM

## 2018-11-19 DIAGNOSIS — N28.9 KIDNEY LESION: ICD-10-CM

## 2018-11-19 PROCEDURE — 99204 OFFICE O/P NEW MOD 45 MIN: CPT | Performed by: PHYSICAL MEDICINE & REHABILITATION

## 2018-11-19 PROCEDURE — G8427 DOCREV CUR MEDS BY ELIG CLIN: HCPCS | Performed by: PHYSICAL MEDICINE & REHABILITATION

## 2018-11-19 PROCEDURE — G8482 FLU IMMUNIZE ORDER/ADMIN: HCPCS | Performed by: PHYSICAL MEDICINE & REHABILITATION

## 2018-11-19 PROCEDURE — G8417 CALC BMI ABV UP PARAM F/U: HCPCS | Performed by: PHYSICAL MEDICINE & REHABILITATION

## 2018-11-19 PROCEDURE — 1101F PT FALLS ASSESS-DOCD LE1/YR: CPT | Performed by: PHYSICAL MEDICINE & REHABILITATION

## 2018-11-19 PROCEDURE — 1090F PRES/ABSN URINE INCON ASSESS: CPT | Performed by: PHYSICAL MEDICINE & REHABILITATION

## 2018-11-19 NOTE — PROGRESS NOTES
New Patient: SPINE    Referring Provider:  Fidelina Louis MD    CHIEF COMPLAINT:    Chief Complaint   Patient presents with    Lower Back Pain     NP, LSP       HISTORY OF PRESENT ILLNESS:      · The patient is being sent at the request of Fidelina Louis MD in consultation as a new spine patient for low back pain and right leg pain. She notes pain in right leg mostly extends into right knee. She also notes right hip pain. The patient is a 66 y.o. female whom reports symptoms for 4 months. Symptoms progressed over the last  2-3 weeks. Patient reports there was not a significant event to cause the symptoms. She notes being in hospital in August for angiogram and notes she thinks back pain was flared up due to laying on her back for too long. Today discomfort is report at 8 out of 10, describing it as sharp, aching, throbbing. Symptoms are aggravated by: movement, walking, standing. Patient has undergone recent treatment including, celebrex and zanaflex. Otherwise, no other treatments. Patient reports previous lumbar spine surgery. She notes undergoing spine surgery sometime in the 1980's. She is unsure of specific time frame. Her pain started after laying flat for an angiogram.  She reports she received anesthesia but this aggravated her pain. She reports her pain was worse with the numbing the groin area. She reports prior the angiogram she did not have any trouble. Now she is unable to walk without a cart.              Associated signs and symptoms:   Neurogenic bowel or bladder symptoms:  no   Perceived weakness:  yes   Difficulty walking:  yes    Recent Imaging (within past one year)   Xrays: yes   MRI or CT of spine: yes    Current/Past Treatment:   · Physical Therapy:  none  · Chiropractic:  none  · Injection:  none  · Medications:   NSAIDS:  yes   Muscle relaxer:  yes   Steriods:  none   Neuropathic medications:  none   Opioids:  none  · Previous surgery:  yes  · Previous surgical consult: no  · Other:  · Infection control  · Tested positive for MRSA in past 12 months:  no  · Tested positive for MSSA \"staph infection\" in past 12 months: no  · Tested positive for VRE (Vancomycin Resistant Enterococci) in past 12 months:   no  · Currently on any antibiotics for an infection: no  · Anticoagulants:  · On a blood thinner:  yes (Plavix) and Xarelto  · Any history of bleeding disorder: no   · MRI Contraindication: yes (pace maker)   · Previous Pain Management: no                   Past Medical History:   Past Medical History:   Diagnosis Date    Allergic rhinitis, cause unspecified     Arthritis     Atrial fibrillation ([address]     Bronchopneumonia     CAD (coronary artery disease)     stent:  post cataract surgery (CABG)    Cerebral artery occlusion with cerebral infarction ([address]     TIA    Chronic gouty arthropathy     Chronic kidney disease     Congestive heart failure, unspecified     Degeneration of cervical intervertebral disc     Essential and other specified forms of tremor     Gout     HIGH CHOLESTEROL     History of CVA (cerebrovascular accident)     Hypertension     Influenza 12/23/2017    Mitral valve stenosis and aortic valve insufficiency     Movement disorder     back problems    Peptic ulcer, unspecified site, unspecified as acute or chronic, without mention of hemorrhage, perforation, or obstruction     Thyroid disease     Unspecified disorder of kidney and ureter     Unspecified hypothyroidism       Past Surgical History:     Past Surgical History:   Procedure Laterality Date    BACK SURGERY      CARDIAC CATHETERIZATION  7/2012    CARDIAC CATHETERIZATION  08/07/2018    Unsuccesful  of RCA    CARDIAC SURGERY      CABG & Cardiac ablation    CHOLECYSTECTOMY      CORONARY ARTERY BYPASS GRAFT  1987    LIMA- Diag/LAD, SVG- RCA    HIP SURGERY Left 03/16/2017    Left hip pinning    JOINT REPLACEMENT      PTCA  11/04/2014    MARLENY - 3.0 x 28 to the Ist Diag    SHOULDER Allergies:  Aspirin; Ativan [lorazepam]; Diltiazem; Diltiazem hcl; Dabigatran; Dabigatran etexilate mesylate; Dabigatran etexilate mesylate; Iodides; Mysoline [primidone]; Nsaids; Other; and Sulfa antibiotics  Social History:    reports that she quit smoking about 31 years ago. Her smoking use included Cigarettes. She has a 28.00 pack-year smoking history. She has never used smokeless tobacco. She reports that she does not drink alcohol or use drugs. Family History:   Family History   Problem Relation Age of Onset    Cancer Sister     Heart Disease Sister     Diabetes Brother     Hypertension Brother     Heart Disease Brother     Stroke Maternal Aunt     Heart Disease Maternal Aunt     Diabetes Maternal Uncle     Hypertension Paternal Aunt     Cancer Maternal Grandmother     Cancer Paternal Grandmother     Rheum Arthritis Neg Hx     Lupus Neg Hx          REVIEW OF SYSTEMS: Full ROS reviewed & scanned from 11/19/2018           PHYSICAL EXAM:    Vitals: Blood pressure 132/80, height 5' 2.99\" (1.6 m), weight 145 lb 15.1 oz (66.2 kg), not currently breastfeeding. GENERAL EXAM:  · General Apparence: Patient is adequately groomed with no evidence of malnutrition. · Psychiatric: Orientation: The patient is oriented to time, place and person. The patient's mood and affect are appropriate   · Vascular: Examination reveals no swelling and palpation reveals no tenderness in upper or lower extremities. Good capillary refill. · The lymphatic examination of the neck, axillae and groin reveals all areas to be without enlargement or induration   Sensation is intact without deficit in the upper and lower extremities to light touch and pinprick  · Coordination of the upper and lower extremities are normal.    CERVICAL EXAMINATION:  · Inspection: Local inspection shows no step-off or bruising. Cervical alignment is normal. No instability is noted.   · Palpation and Percussion: No evidence of tenderness at the Bilirubin 0.4 0.0 - 1.0 mg/dL    Bilirubin, Direct <0.2 0.0 - 0.3 mg/dL    Bilirubin, Indirect see below 0.0 - 1.0 mg/dL       Impression (Medical Decision Making):       1. Spinal stenosis of lumbar region with neurogenic claudication    2. Lumbar foraminal stenosis    3. Lumbar radiculopathy    4. Kidney lesion        Plan (Medical Decision Making):    I discussed the diagnosis and the treatment options with Brian Shoemaker today. In Summary:  The various treatment options were outlined and discussed with Brian Shoemaker including:  Conservative care options: physical therapy, ice, medications, bracing, and activity modification. The indications for therapeutic injections. The indications for additional imaging/laboratory studies. The indications for (possible future) interventions. After considering the various options discussed, Brian Shoemaker elected to pursue a course of treatment that includes the followin. Medications: No further recommendations for new medications. 2. PT:  Encouraged to continue with HEP. 3. Further studies:  Discuss with Dr Andre King about the kidney leasion    4. Interventional:  We discussed pursuing a right L3 and L4 TF epidural steroid injection to address the pain. Radiologic imaging and symptoms confirm the pain etiology. Risks, benefits and alternatives of interventional options were discussed. These include and are not limited to bleeding, infection, spinal headache, nerve injury and no relief of pain. The patient verbalized understanding and would like to proceed. The patient will be scheduled accordingly.       5. Healthy Lifestyle Measures:  Patient education material reviewing the following was distributed to Brian Shoemaker  Anatomic drawings  Healthy lifestyle education  Osteoporosis prevention,   Back and neck pain educational information   Advanced imaging preparedness    Posture education   Proper lifting and carrying techniques,   Weight

## 2018-11-21 ENCOUNTER — TELEPHONE (OUTPATIENT)
Dept: CARDIOLOGY CLINIC | Age: 78
End: 2018-11-21

## 2018-11-21 ENCOUNTER — TELEPHONE (OUTPATIENT)
Dept: ORTHOPEDIC SURGERY | Age: 78
End: 2018-11-21

## 2018-11-21 NOTE — TELEPHONE ENCOUNTER
CARDIAC CLEARANCE     What type of procedure are you having? Lumbar Steroid Injection    Which physician is performing your procedure? Dr La Velasquez    When is your procedure scheduled for?12/03/18    Where are you having this procedure? [facility]    Are you taking Blood Thinners? Yes   If so what? (Name/dose/frequesncy) Plavix (pt states she's also on Xarelto)     Does the surgeon want you to stop your blood thinner? If so for how long? Need to know if pt can hold Xarelto 3 days prior and 7 days prior for the Plavix is this ok?      Phone Number and Contact Name for Physicians office:    Fax number to send information: [phone]

## 2018-11-21 NOTE — TELEPHONE ENCOUNTER
L/m for approval to hold XARELTO FOR 3 DAYS PRIOR AND for PLAVIX  7 days prior to [facility] on 12/03/2018. Patient aware of hold dates.     Waiting for approval

## 2018-11-23 ENCOUNTER — TELEPHONE (OUTPATIENT)
Dept: ORTHOPEDIC SURGERY | Age: 78
End: 2018-11-23

## 2018-11-23 NOTE — TELEPHONE ENCOUNTER
DOS   12/03/2018  CPT   25167   07989   88465.26  20131  DX   M48.062   M99.83   M54.12  OP SX AUTH  NPR    RIGHT  LEVELS   L3 - L4   PROCEDURE   TRANSFORAMINAL EPIDURAL INJECTION    [address] [place] [address],[address]:   MEDICARE

## 2018-11-28 ENCOUNTER — TELEPHONE (OUTPATIENT)
Dept: CARDIOLOGY CLINIC | Age: 78
End: 2018-11-28

## 2018-11-29 NOTE — TELEPHONE ENCOUNTER
Cardiovascular Diagnoses:  CAD  Angina  AF  Severe COPD      Based on the evaluation on 8/2018,and diagnostic testing including Cath 8/2018, there are no apparent cardiac contraindications to the proposed procedure using standard anesthetic technique at low to moderate cardiac risk. OK to hold Plavix for 5 days and Xarelto for 3 days if needed and restart ASAP post op at surgery direction. There are no apparent interventions to ameliorate the cardiac risk. This clinical assessment assumes a full Anesthesia evaluation for overall risk of airway management, type and route of anesthetic, and other relevant anesthesia-specific considerations.

## 2018-12-03 ENCOUNTER — HOSPITAL ENCOUNTER (OUTPATIENT)
Age: 78
Setting detail: OUTPATIENT SURGERY
Discharge: HOME | End: 2018-12-03
Attending: PHYSICAL MEDICINE & REHABILITATION | Admitting: PHYSICAL MEDICINE & REHABILITATION
Payer: MEDICARE

## 2018-12-03 ENCOUNTER — APPOINTMENT (OUTPATIENT)
Dept: GENERAL RADIOLOGY | Age: 78
End: 2018-12-03
Attending: PHYSICAL MEDICINE & REHABILITATION
Payer: MEDICARE

## 2018-12-03 VITALS
BODY MASS INDEX: 25.52 KG/M2 | SYSTOLIC BLOOD PRESSURE: 129 MMHG | HEIGHT: 63 IN | OXYGEN SATURATION: 98 % | HEART RATE: 70 BPM | DIASTOLIC BLOOD PRESSURE: 62 MMHG | WEIGHT: 144 LBS | RESPIRATION RATE: 20 BRPM

## 2018-12-03 PROCEDURE — 2709999900 HC NON-CHARGEABLE SUPPLY: Performed by: PHYSICAL MEDICINE & REHABILITATION

## 2018-12-03 PROCEDURE — 2500000003 HC RX 250 WO HCPCS: Performed by: PHYSICAL MEDICINE & REHABILITATION

## 2018-12-03 PROCEDURE — 6360000002 HC RX W HCPCS: Performed by: PHYSICAL MEDICINE & REHABILITATION

## 2018-12-03 PROCEDURE — 99152 MOD SED SAME PHYS/QHP 5/>YRS: CPT | Performed by: PHYSICAL MEDICINE & REHABILITATION

## 2018-12-03 PROCEDURE — 3610000056 HC PAIN LEVEL 4 BASE (NON-OR): Performed by: PHYSICAL MEDICINE & REHABILITATION

## 2018-12-03 PROCEDURE — 3209999900 FLUORO FOR SURGICAL PROCEDURES

## 2018-12-03 RX ORDER — BUPIVACAINE HYDROCHLORIDE 5 MG/ML
INJECTION, SOLUTION PERINEURAL
Status: COMPLETED | OUTPATIENT
Start: 2018-12-03 | End: 2018-12-03

## 2018-12-03 RX ORDER — MIDAZOLAM HYDROCHLORIDE 1 MG/ML
INJECTION INTRAMUSCULAR; INTRAVENOUS
Status: COMPLETED | OUTPATIENT
Start: 2018-12-03 | End: 2018-12-03

## 2018-12-03 RX ORDER — CLOPIDOGREL BISULFATE 75 MG/1
75 TABLET ORAL DAILY
Qty: 90 TABLET | Refills: 2 | COMMUNITY
Start: 2018-12-04 | End: 2019-09-11 | Stop reason: SDUPTHER

## 2018-12-03 RX ORDER — METHYLPREDNISOLONE ACETATE 80 MG/ML
INJECTION, SUSPENSION INTRA-ARTICULAR; INTRALESIONAL; INTRAMUSCULAR; SOFT TISSUE
Status: COMPLETED | OUTPATIENT
Start: 2018-12-03 | End: 2018-12-03

## 2018-12-03 RX ORDER — LIDOCAINE HYDROCHLORIDE 10 MG/ML
INJECTION, SOLUTION INFILTRATION; PERINEURAL
Status: COMPLETED | OUTPATIENT
Start: 2018-12-03 | End: 2018-12-03

## 2018-12-03 ASSESSMENT — PAIN - FUNCTIONAL ASSESSMENT: PAIN_FUNCTIONAL_ASSESSMENT: 0-10

## 2018-12-03 ASSESSMENT — PAIN DESCRIPTION - DESCRIPTORS: DESCRIPTORS: ACHING

## 2018-12-03 ASSESSMENT — PAIN SCALES - GENERAL: PAINLEVEL_OUTOF10: 0

## 2018-12-03 NOTE — H&P
HISTORY AND PHYSICAL/PRE-SEDATION ASSESSMENT    Patient:  Yoni Condon   :  [id number]  Medical Record No.:  [id number]   Date:  12/3/2018  Physician:  Thony Burger M.D. Facility: [facility]    Nursing History and Physical reviewed and agreed upon. Additional findings:    Allergies:  Aspirin; Ativan [lorazepam]; Diltiazem; Diltiazem hcl; Dabigatran; Dabigatran etexilate mesylate; Dabigatran etexilate mesylate; Iodides; Mysoline [primidone]; Nsaids; Other; and Sulfa antibiotics    Home Medications:    Prior to Admission medications    Medication Sig Start Date End Date Taking?  Authorizing Provider   hydrochlorothiazide (HYDRODIURIL) 25 MG tablet Take 1 tablet by mouth every morning 18  Yes Marlin Kimbrough MD   allopurinol (ZYLOPRIM) 300 MG tablet Take 300 mg by mouth daily   Yes Historical Provider, MD   levothyroxine (SYNTHROID) 112 MCG tablet TAKE 1 TABLET DAILY 10/12/18  Yes Denis Waldrop MD   Melatonin 5 MG CAPS 1-2 pills nightly  Patient taking differently: nightly as needed 1-2 pills nightly 18  Yes Denis Waldrop MD   allopurinol (ZYLOPRIM) 100 MG tablet Take 1 tablet by mouth daily Take along with 300mg tablet for total of 400mg. 18  Yes Denis Waldrop MD   tiZANidine (ZANAFLEX) 4 MG tablet Take 1 tablet by mouth every 6 hours as needed (prn) 18  Yes Denis Waldrop MD   topiramate (TOPAMAX) 100 MG tablet Take 1 tablet by mouth 2 times daily 1/10/18  Yes Denis Waldrop MD   amLODIPine (NORVASC) 10 MG tablet Take 1 tablet by mouth daily 18  Yes TIFFANIE Quintanilla CNP   metoprolol tartrate (LOPRESSOR) 100 MG tablet Take 1 tablet by mouth 2 times daily 17  Yes Marlin Kimbrough MD   furosemide (LASIX) 20 MG tablet Take 20 mg by mouth as needed   Yes Historical Provider, MD   meclizine (ANTIVERT) 12.5 MG tablet Take 12.5 mg by mouth 3 times daily as needed for Dizziness or Nausea    Historical Provider, MD   celecoxib

## 2018-12-05 ENCOUNTER — TELEPHONE (OUTPATIENT)
Dept: FAMILY MEDICINE CLINIC | Age: 78
End: 2018-12-05

## 2018-12-05 DIAGNOSIS — N28.89 KIDNEY MASS: Primary | ICD-10-CM

## 2018-12-05 NOTE — TELEPHONE ENCOUNTER
Pt call wanting to see if you can take a look at the CT done on 11/09/18 as Dr. Vanna Morrissey advise there is a mass in her kidney and was told to call you to have a repeat or follow up CT.   Please advise pt is a little concern

## 2018-12-13 ENCOUNTER — HOSPITAL ENCOUNTER (OUTPATIENT)
Dept: CT IMAGING | Age: 78
Discharge: HOME | End: 2018-12-13
Payer: MEDICARE

## 2018-12-13 ENCOUNTER — HOSPITAL ENCOUNTER (OUTPATIENT)
Age: 78
Discharge: HOME | End: 2018-12-13
Payer: MEDICARE

## 2018-12-13 DIAGNOSIS — N28.89 KIDNEY MASS: ICD-10-CM

## 2018-12-13 DIAGNOSIS — I10 ESSENTIAL HYPERTENSION: Primary | ICD-10-CM

## 2018-12-13 LAB
ANION GAP SERPL CALCULATED.3IONS-SCNC: 11 MMOL/L (ref 3–16)
BUN BLDV-MCNC: 19 MG/DL (ref 7–20)
CALCIUM SERPL-MCNC: 8.7 MG/DL (ref 8.3–10.6)
CHLORIDE BLD-SCNC: 108 MMOL/L (ref 99–110)
CO2: 24 MMOL/L (ref 21–32)
CREAT SERPL-MCNC: 1 MG/DL (ref 0.6–1.2)
GFR AFRICAN AMERICAN: >60
GFR NON-AFRICAN AMERICAN: 54
GLUCOSE BLD-MCNC: 107 MG/DL (ref 70–99)
POTASSIUM SERPL-SCNC: 4.2 MMOL/L (ref 3.5–5.1)
SODIUM BLD-SCNC: 143 MMOL/L (ref 136–145)

## 2018-12-13 PROCEDURE — 6360000004 HC RX CONTRAST MEDICATION: Performed by: FAMILY MEDICINE

## 2018-12-13 PROCEDURE — 74170 CT ABD WO CNTRST FLWD CNTRST: CPT

## 2018-12-13 PROCEDURE — 36415 COLL VENOUS BLD VENIPUNCTURE: CPT

## 2018-12-13 PROCEDURE — 80048 BASIC METABOLIC PNL TOTAL CA: CPT

## 2018-12-13 RX ADMIN — IOPAMIDOL 75 ML: 755 INJECTION, SOLUTION INTRAVENOUS at 09:18

## 2018-12-18 NOTE — PROGRESS NOTES
auscultation, no crackles or wheezing. CARDIOVASCULAR:  Regular rate and irregular rhythm with no murmurs, gallops, rubs, or abnormal heart sounds, normal PMI. The apical impulses not displaced. Heart tones are crisp and normal. Cervical veins are not engorged                 JVP less than 8 cm H2O                                                                              The carotid upstroke is normal in amplitude and contour without delay or bruit    ABDOMEN:  Normal bowel sounds, non-distended and non-tender to palpation   EXT: No edema, no calf tenderness. Pulses are present bilaterally. Right groin site looks unremarkable. DATA:    Lab Results   Component Value Date    ALT 14 10/24/2018    AST 16 10/24/2018    ALKPHOS 70 10/24/2018    BILITOT 0.4 10/24/2018     Lab Results   Component Value Date    CREATININE 1.0 12/13/2018    BUN 19 12/13/2018     12/13/2018    K 4.2 12/13/2018     12/13/2018    CO2 24 12/13/2018     Lab Results   Component Value Date    TSH 1.37 10/03/2018    T2URKPX 7.3 10/03/2018     Lab Results   Component Value Date    WBC 10.2 08/07/2018    HGB 14.4 08/07/2018    HCT 44.3 08/07/2018    MCV 80.2 08/07/2018     08/07/2018     No components found for: CHLPL  Lab Results   Component Value Date    TRIG 227 (A) 03/19/2018    TRIG 240 (H) 06/02/2016    TRIG 237 (H) 10/02/2015     Lab Results   Component Value Date    HDL 28 (A) 03/19/2018    HDL 30 (L) 06/02/2016    HDL 26 (L) 10/02/2015     Lab Results   Component Value Date    LDLCALC 89 03/19/2018    LDLCALC 75 06/02/2016    LDLCALC 47 10/02/2015     Lab Results   Component Value Date    LABVLDL 45 (A) 03/19/2018    LABVLDL 48 06/02/2016    LABVLDL 47 10/02/2015     Cardiac angiogram: 8/7/18  The PCI of complex proximal RCA chronic total occlusion was unsuccessful (J- score 3). Underwent successful Angioplasty of high-grade proximal RCA lesion proximal to the .   RECOMMENDATIONS:  Attempt on this complex long  was unsuccessful. Lack   Of retrograde collaterals along with a very ambiguous cap as well as a large RV marginal branch and bridging collateral coming off at the cap makes it a  challenging repeat attempt. If she continues to have angina, it is recommended to proceed with the single-vessel SVG to the RCA. Echo: 2/17/16 ([facility])  Conclusions: Normal LV size and systolic function Mild to moderate mitral  regurgitation Mild tricuspid regurgitation  Findings:   Left [facility]: Mild to moderate enlargement of the left atrium. Left Ventricle: Upper limits of normal left ventricle size. No left ventricular  hypertrophy. Normal left ventricular systolic function. The ejection fraction   Is visually estimated to be 55 %. Right [facility]: Normal right atrial size. RightVentricle: Normal right ventricle size. Normal right ventricular function. Aortic Valve: Tri-leaflet aortic valve. Mild aortic cusp calcification. No  aortic valve stenosis. Mild (1+) aortic valve regurgitation. Aorta: No dilatation of the aortic root. IVC/PA: Normal IVC size and normal respiratory  collapse consistent with normal right atrial pressure. Mitral Valve: Mild mitral valve leaflet calcification. Mild to moderate (2+) mitral valve  regurgitation. No mitral valve stenosis. Tricuspid Valve: Mild (1+) tricuspid  regurgitation. The pulmonary artery pressure is normal.   Pulmonic Valve: Mild  pulmonic regurgitation. Pericardium: Normal pericardium with no significant  pericardial effusion.     Nuc stress 6/28/16: ([facility])   Final Conclusions/Summary  Stress ECG Impressions: No significant ST changes during vasodilator infusion  Summary: - Abnormal moderat risk stress test with moderate size and severity  anterolateral wall ischemia - Normal LV size and systolic function    Assessment:     Problem: Coronary artery disease  Remote CABG  7/2008 Cath: Patent LIMA to LAD, occluded SVG to RCA with collaterals.  Sandrita Cueva will continue with her

## 2018-12-19 ENCOUNTER — OFFICE VISIT (OUTPATIENT)
Dept: CARDIOLOGY CLINIC | Age: 78
End: 2018-12-19
Payer: MEDICARE

## 2018-12-19 ENCOUNTER — HOSPITAL ENCOUNTER (OUTPATIENT)
Age: 78
Discharge: HOME | End: 2018-12-19
Payer: MEDICARE

## 2018-12-19 ENCOUNTER — HOSPITAL ENCOUNTER (OUTPATIENT)
Dept: GENERAL RADIOLOGY | Age: 78
Discharge: HOME | End: 2018-12-19
Payer: MEDICARE

## 2018-12-19 VITALS
SYSTOLIC BLOOD PRESSURE: 138 MMHG | HEART RATE: 84 BPM | WEIGHT: 142 LBS | DIASTOLIC BLOOD PRESSURE: 76 MMHG | BODY MASS INDEX: 25.15 KG/M2

## 2018-12-19 DIAGNOSIS — I48.0 PAROXYSMAL ATRIAL FIBRILLATION (HCC): Chronic | ICD-10-CM

## 2018-12-19 DIAGNOSIS — R06.02 SOB (SHORTNESS OF BREATH): ICD-10-CM

## 2018-12-19 DIAGNOSIS — I25.10 CAD S/P PERCUTANEOUS CORONARY ANGIOPLASTY: ICD-10-CM

## 2018-12-19 DIAGNOSIS — Z95.0 PACEMAKER: ICD-10-CM

## 2018-12-19 DIAGNOSIS — E78.2 MIXED HYPERLIPIDEMIA: Chronic | ICD-10-CM

## 2018-12-19 DIAGNOSIS — I48.0 PAROXYSMAL ATRIAL FIBRILLATION (HCC): Primary | Chronic | ICD-10-CM

## 2018-12-19 DIAGNOSIS — I10 ESSENTIAL HYPERTENSION, BENIGN: Chronic | ICD-10-CM

## 2018-12-19 DIAGNOSIS — Z98.61 CAD S/P PERCUTANEOUS CORONARY ANGIOPLASTY: ICD-10-CM

## 2018-12-19 LAB
ANION GAP SERPL CALCULATED.3IONS-SCNC: 18 MMOL/L (ref 3–16)
BUN BLDV-MCNC: 29 MG/DL (ref 7–20)
CALCIUM SERPL-MCNC: 9.6 MG/DL (ref 8.3–10.6)
CHLORIDE BLD-SCNC: 103 MMOL/L (ref 99–110)
CO2: 22 MMOL/L (ref 21–32)
CREAT SERPL-MCNC: 1.4 MG/DL (ref 0.6–1.2)
GFR AFRICAN AMERICAN: 44
GFR NON-AFRICAN AMERICAN: 36
GLUCOSE BLD-MCNC: 94 MG/DL (ref 70–99)
HCT VFR BLD CALC: 45.5 % (ref 36–48)
HEMOGLOBIN: 14.4 G/DL (ref 12–16)
MCH RBC QN AUTO: 26 PG (ref 26–34)
MCHC RBC AUTO-ENTMCNC: 31.6 G/DL (ref 31–36)
MCV RBC AUTO: 82.4 FL (ref 80–100)
PDW BLD-RTO: 17.3 % (ref 12.4–15.4)
PLATELET # BLD: 359 K/UL (ref 135–450)
PMV BLD AUTO: 8.9 FL (ref 5–10.5)
POTASSIUM SERPL-SCNC: 4.9 MMOL/L (ref 3.5–5.1)
PRO-BNP: 2541 PG/ML (ref 0–449)
RBC # BLD: 5.52 M/UL (ref 4–5.2)
SODIUM BLD-SCNC: 143 MMOL/L (ref 136–145)
WBC # BLD: 14.2 K/UL (ref 4–11)

## 2018-12-19 PROCEDURE — G8482 FLU IMMUNIZE ORDER/ADMIN: HCPCS | Performed by: NURSE PRACTITIONER

## 2018-12-19 PROCEDURE — 1101F PT FALLS ASSESS-DOCD LE1/YR: CPT | Performed by: NURSE PRACTITIONER

## 2018-12-19 PROCEDURE — 99214 OFFICE O/P EST MOD 30 MIN: CPT | Performed by: NURSE PRACTITIONER

## 2018-12-19 PROCEDURE — 71046 X-RAY EXAM CHEST 2 VIEWS: CPT

## 2018-12-19 PROCEDURE — 93000 ELECTROCARDIOGRAM COMPLETE: CPT | Performed by: NURSE PRACTITIONER

## 2018-12-19 PROCEDURE — G8598 ASA/ANTIPLAT THER USED: HCPCS | Performed by: NURSE PRACTITIONER

## 2018-12-19 PROCEDURE — 1036F TOBACCO NON-USER: CPT | Performed by: NURSE PRACTITIONER

## 2018-12-19 PROCEDURE — 80048 BASIC METABOLIC PNL TOTAL CA: CPT

## 2018-12-19 PROCEDURE — 4040F PNEUMOC VAC/ADMIN/RCVD: CPT | Performed by: NURSE PRACTITIONER

## 2018-12-19 PROCEDURE — 83880 ASSAY OF NATRIURETIC PEPTIDE: CPT

## 2018-12-19 PROCEDURE — G8417 CALC BMI ABV UP PARAM F/U: HCPCS | Performed by: NURSE PRACTITIONER

## 2018-12-19 PROCEDURE — G8399 PT W/DXA RESULTS DOCUMENT: HCPCS | Performed by: NURSE PRACTITIONER

## 2018-12-19 PROCEDURE — 1123F ACP DISCUSS/DSCN MKR DOCD: CPT | Performed by: NURSE PRACTITIONER

## 2018-12-19 PROCEDURE — 1090F PRES/ABSN URINE INCON ASSESS: CPT | Performed by: NURSE PRACTITIONER

## 2018-12-19 PROCEDURE — 85027 COMPLETE CBC AUTOMATED: CPT

## 2018-12-19 PROCEDURE — G8428 CUR MEDS NOT DOCUMENT: HCPCS | Performed by: NURSE PRACTITIONER

## 2018-12-19 PROCEDURE — 36415 COLL VENOUS BLD VENIPUNCTURE: CPT

## 2018-12-19 RX ORDER — FUROSEMIDE 20 MG/1
20 TABLET ORAL DAILY
Qty: 30 TABLET | Refills: 3 | Status: SHIPPED | OUTPATIENT
Start: 2018-12-19 | End: 2018-12-28

## 2018-12-20 ENCOUNTER — TELEPHONE (OUTPATIENT)
Dept: CARDIOLOGY CLINIC | Age: 78
End: 2018-12-20

## 2018-12-20 NOTE — TELEPHONE ENCOUNTER
----- Message from TIFFANIE Singh CNP sent at 12/19/2018  4:24 PM EST -----  Chest x-ray showed pleural effusion, increase her Lasix 20 mg BID x 5 days.

## 2018-12-28 ENCOUNTER — OFFICE VISIT (OUTPATIENT)
Dept: FAMILY MEDICINE CLINIC | Age: 78
End: 2018-12-28
Payer: MEDICARE

## 2018-12-28 ENCOUNTER — TELEPHONE (OUTPATIENT)
Dept: FAMILY MEDICINE CLINIC | Age: 78
End: 2018-12-28

## 2018-12-28 VITALS
HEART RATE: 70 BPM | BODY MASS INDEX: 26.36 KG/M2 | OXYGEN SATURATION: 97 % | SYSTOLIC BLOOD PRESSURE: 160 MMHG | DIASTOLIC BLOOD PRESSURE: 90 MMHG | WEIGHT: 148.8 LBS

## 2018-12-28 DIAGNOSIS — M15.9 PRIMARY OSTEOARTHRITIS INVOLVING MULTIPLE JOINTS: ICD-10-CM

## 2018-12-28 DIAGNOSIS — I10 ESSENTIAL HYPERTENSION, BENIGN: Chronic | ICD-10-CM

## 2018-12-28 DIAGNOSIS — Z98.61 CAD S/P PERCUTANEOUS CORONARY ANGIOPLASTY: ICD-10-CM

## 2018-12-28 DIAGNOSIS — I50.21 ACUTE SYSTOLIC CONGESTIVE HEART FAILURE (HCC): Primary | ICD-10-CM

## 2018-12-28 DIAGNOSIS — I25.10 CAD S/P PERCUTANEOUS CORONARY ANGIOPLASTY: ICD-10-CM

## 2018-12-28 PROCEDURE — G8482 FLU IMMUNIZE ORDER/ADMIN: HCPCS | Performed by: FAMILY MEDICINE

## 2018-12-28 PROCEDURE — G8598 ASA/ANTIPLAT THER USED: HCPCS | Performed by: FAMILY MEDICINE

## 2018-12-28 PROCEDURE — 1036F TOBACCO NON-USER: CPT | Performed by: FAMILY MEDICINE

## 2018-12-28 PROCEDURE — 99214 OFFICE O/P EST MOD 30 MIN: CPT | Performed by: FAMILY MEDICINE

## 2018-12-28 PROCEDURE — G8427 DOCREV CUR MEDS BY ELIG CLIN: HCPCS | Performed by: FAMILY MEDICINE

## 2018-12-28 PROCEDURE — G8399 PT W/DXA RESULTS DOCUMENT: HCPCS | Performed by: FAMILY MEDICINE

## 2018-12-28 PROCEDURE — 4040F PNEUMOC VAC/ADMIN/RCVD: CPT | Performed by: FAMILY MEDICINE

## 2018-12-28 PROCEDURE — G8417 CALC BMI ABV UP PARAM F/U: HCPCS | Performed by: FAMILY MEDICINE

## 2018-12-28 PROCEDURE — 1090F PRES/ABSN URINE INCON ASSESS: CPT | Performed by: FAMILY MEDICINE

## 2018-12-28 PROCEDURE — 1123F ACP DISCUSS/DSCN MKR DOCD: CPT | Performed by: FAMILY MEDICINE

## 2018-12-28 PROCEDURE — 1101F PT FALLS ASSESS-DOCD LE1/YR: CPT | Performed by: FAMILY MEDICINE

## 2018-12-28 RX ORDER — TORSEMIDE 20 MG/1
20 TABLET ORAL DAILY
Qty: 30 TABLET | Refills: 2 | Status: SHIPPED | OUTPATIENT
Start: 2018-12-28 | End: 2018-12-28 | Stop reason: SDUPTHER

## 2018-12-28 RX ORDER — HYDROCODONE BITARTRATE AND ACETAMINOPHEN 5; 325 MG/1; MG/1
1 TABLET ORAL EVERY 4 HOURS PRN
Qty: 120 TABLET | Refills: 0 | Status: SHIPPED | OUTPATIENT
Start: 2018-12-28 | End: 2019-04-18 | Stop reason: SDUPTHER

## 2018-12-28 RX ORDER — LOSARTAN POTASSIUM 25 MG/1
25 TABLET ORAL DAILY
Qty: 30 TABLET | Refills: 3 | Status: SHIPPED | OUTPATIENT
Start: 2018-12-28 | End: 2019-04-18 | Stop reason: SDUPTHER

## 2018-12-28 RX ORDER — TORSEMIDE 20 MG/1
20 TABLET ORAL DAILY
Qty: 30 TABLET | Refills: 2 | Status: SHIPPED | OUTPATIENT
Start: 2018-12-28 | End: 2019-04-18 | Stop reason: SDUPTHER

## 2019-01-01 NOTE — COMMUNICATION BODY
[address]     Outpatient Follow Up Note    CHIEF COMPLAINT / HPI:  Follow Up secondary to CAD/ HTN / AFib/ and hyperlipidemia     Beatriz Koch is 68 y.o. female who presents today for a hospital  follow up. She presented with SOB and at fib RVR  Subjective:   Mrs Nany Little is here in f/u with h/o CAD; HTN and AFib; She also has hyperlipidemia. She has  significant coronary disease with multiple interventions in the past.  Stress test was performed on 5/29/12 that showed normal myocardial perfusion. The echocardiogram of 12/5/11 showed mild left ventricular hypertrophy with normal left ventricular systolic function with estimated ejection fraction at 55%. She had cath 7/12 and started on Ranexa   She had a DONA in July which was stable. She had PFTs which showed severe obstructive disease, she thinks she did get little better with inhaler barby we had given her. Since October 2016  she has had SSCP, was started on Ranexa and has increased it to 1000 mg twice daily and which she continued to have a dull chest pain. On 2/2/17 she had a LHC which showed stable coronary findings. She was seen by DR. Faisal Guerrero for at fib RVR probably related to infection, she did convert to NSR    Past Medical History:   Diagnosis Date    Allergic rhinitis, cause unspecified     Arthritis     Atrial fibrillation ([address]     Bronchopneumonia     CAD (coronary artery disease)     stent:  post cataract surgery (CABG)    Cerebral artery occlusion with cerebral infarction ([address]     TIA    Chronic gouty arthropathy     Chronic kidney disease     Congestive heart failure, unspecified     Degeneration of cervical intervertebral disc     Essential and other specified forms of tremor     Gout     HIGH CHOLESTEROL     History of CVA (cerebrovascular accident)     Hypertension     Influenza 12/23/2017    Mitral valve stenosis and aortic valve insufficiency     Movement disorder     back problems    Peptic ulcer, OF SYSTEMS:   CONSTITUTIONAL: No major weight gain or loss, fatigue, weakness, night sweats or fever. There's been no change in energy level, sleep pattern, or activity level. HEENT: No new vision difficulties or ringing in the ears. RESPIRATORY: No new SOB, PND, orthopnea or cough. CARDIOVASCULAR: See HPI  GI: No nausea, vomiting, diarrhea, constipation, abdominal pain or changes in bowel habits. : No urinary frequency, urgency, incontinence hematuria or dysuria. SKIN: No cyanosis or skin lesions. MUSCULOSKELETAL: No new muscle or joint pain. NEUROLOGICAL: No syncope or TIA-like symptoms. PSYCHIATRIC: No anxiety, pain, insomnia or depression    Objective:   PHYSICAL EXAM:        VITALS:    Vitals:    01/09/18 0831   BP: (!) 150/62   Pulse:          CONSTITUTIONAL: Cooperative, no apparent distress, and appears well nourished / developed  NEUROLOGIC:  Awake and orientated to person, place and time. PSYCH: Calm affect. SKIN: Warm and dry. HEENT: Sclera non-icteric, normocephalic, neck supple, no elevation of JVP, normal carotid pulses with no bruits and thyroid normal size. LUNGS:  No increased work of breathing and clear to auscultation, no crackles or wheezing. CARDIOVASCULAR:  Regular rate and irregular rhythm with no murmurs, gallops, rubs, or abnormal heart sounds, normal PMI. The apical impulses not displaced. Heart tones are crisp and normal. Cervical veins are not engorged       EKG today paced            JVP less than 8 cm H2O                                                             The carotid upstroke is normal in amplitude and contour without delay or bruit    ABDOMEN:  Normal bowel sounds, non-distended and non-tender to palpation   EXT: No edema, no calf tenderness. Pulses are present bilaterally.     DATA:    Lab Results   Component Value Date    ALT 16 12/24/2017    AST 19 12/24/2017    ALKPHOS 76 12/24/2017    BILITOT 0.5 12/24/2017     Lab Results   Component Value Date CREATININE 1.0 12/24/2017    BUN 29 (H) 12/24/2017     12/24/2017    K 4.1 12/24/2017     12/24/2017    CO2 19 (L) 12/24/2017     Lab Results   Component Value Date    TSH 1.27 04/27/2016    Z1BIIVX 8.7 07/05/2012     Lab Results   Component Value Date    WBC 10.3 12/23/2017    HGB 15.1 12/24/2017    HCT 46.5 12/24/2017    MCV 78.0 (L) 12/23/2017     12/23/2017     No components found for: CHLPL  Lab Results   Component Value Date    TRIG 240 (H) 06/02/2016    TRIG 237 (H) 10/02/2015    TRIG 286 (H) 10/19/2012     Lab Results   Component Value Date    HDL 30 (L) 06/02/2016    HDL 26 (L) 10/02/2015    HDL 34 (L) 10/19/2012     Lab Results   Component Value Date    LDLCALC 75 06/02/2016    LDLCALC 47 10/02/2015    LDLCALC 55 10/19/2012     Lab Results   Component Value Date    LABVLDL 48 06/02/2016    LABVLDL 47 10/02/2015    LABVLDL 57 10/19/2012       Assessment:     Problem: Coronary artery disease  Remote CABG  7/2008 Cath: Patent LIMA to LAD, occluded SVG to RCA with collaterals. Reather Honer will continue with her current regime including diet and exercise. .   2/3/17 Cath: which showed 100% prox RCA with bridging collateral, 100% SVG to RCA, 100% prox LAD after D1, D1 stent widely patent, Mild Cx disease, Normal LV FXN--EF 60%--EDP 12 post hydration.               Stable anatomy  Patient denies any chest pain. On Ranexa, Plavix,  Norvasc, and Lopressor      Problem: Hypertension: Stable.   Vitals:    01/09/18 0831   BP: (!) 150/62   Pulse:      Continue current medications: Lopressor, Lasix, and Norvasc     Problem: Atrial fibrillation/ Atrial Flutter  Currently in SR.   - Paroxysmal atrial flutter                        - Hx of cardioversion in 2008                        -s/o RFCA for A fib at [facility] by Dr. August Garza in 2009                         - S/p DCCV on 8/2/17 by Dr. Keith Cortez                        - Currently in  paced rhythm-        Problem: Hyperlipidemia    10/16: Total 153 Triglycerides Statement Selected

## 2019-01-03 ENCOUNTER — OFFICE VISIT (OUTPATIENT)
Dept: CARDIOLOGY CLINIC | Age: 79
End: 2019-01-03
Payer: MEDICARE

## 2019-01-03 VITALS
RESPIRATION RATE: 16 BRPM | BODY MASS INDEX: 25.86 KG/M2 | DIASTOLIC BLOOD PRESSURE: 76 MMHG | HEART RATE: 92 BPM | WEIGHT: 146 LBS | SYSTOLIC BLOOD PRESSURE: 146 MMHG

## 2019-01-03 DIAGNOSIS — I10 ESSENTIAL HYPERTENSION, BENIGN: Chronic | ICD-10-CM

## 2019-01-03 DIAGNOSIS — E78.2 MIXED HYPERLIPIDEMIA: Chronic | ICD-10-CM

## 2019-01-03 DIAGNOSIS — I48.3 TYPICAL ATRIAL FLUTTER (HCC): ICD-10-CM

## 2019-01-03 DIAGNOSIS — I25.10 CAD S/P PERCUTANEOUS CORONARY ANGIOPLASTY: ICD-10-CM

## 2019-01-03 DIAGNOSIS — I48.0 PAROXYSMAL ATRIAL FIBRILLATION (HCC): Chronic | ICD-10-CM

## 2019-01-03 DIAGNOSIS — Z98.61 CAD S/P PERCUTANEOUS CORONARY ANGIOPLASTY: ICD-10-CM

## 2019-01-03 DIAGNOSIS — I49.5 SICK SINUS SYNDROME (HCC): ICD-10-CM

## 2019-01-03 DIAGNOSIS — I50.21 ACUTE SYSTOLIC CONGESTIVE HEART FAILURE (HCC): Primary | ICD-10-CM

## 2019-01-03 PROCEDURE — 1123F ACP DISCUSS/DSCN MKR DOCD: CPT | Performed by: NURSE PRACTITIONER

## 2019-01-03 PROCEDURE — G8417 CALC BMI ABV UP PARAM F/U: HCPCS | Performed by: NURSE PRACTITIONER

## 2019-01-03 PROCEDURE — G8427 DOCREV CUR MEDS BY ELIG CLIN: HCPCS | Performed by: NURSE PRACTITIONER

## 2019-01-03 PROCEDURE — G8598 ASA/ANTIPLAT THER USED: HCPCS | Performed by: NURSE PRACTITIONER

## 2019-01-03 PROCEDURE — 1036F TOBACCO NON-USER: CPT | Performed by: NURSE PRACTITIONER

## 2019-01-03 PROCEDURE — 99214 OFFICE O/P EST MOD 30 MIN: CPT | Performed by: NURSE PRACTITIONER

## 2019-01-03 PROCEDURE — 1101F PT FALLS ASSESS-DOCD LE1/YR: CPT | Performed by: NURSE PRACTITIONER

## 2019-01-03 PROCEDURE — G8482 FLU IMMUNIZE ORDER/ADMIN: HCPCS | Performed by: NURSE PRACTITIONER

## 2019-01-03 PROCEDURE — 4040F PNEUMOC VAC/ADMIN/RCVD: CPT | Performed by: NURSE PRACTITIONER

## 2019-01-03 PROCEDURE — 1090F PRES/ABSN URINE INCON ASSESS: CPT | Performed by: NURSE PRACTITIONER

## 2019-01-03 PROCEDURE — G8399 PT W/DXA RESULTS DOCUMENT: HCPCS | Performed by: NURSE PRACTITIONER

## 2019-01-18 ENCOUNTER — OFFICE VISIT (OUTPATIENT)
Dept: FAMILY MEDICINE CLINIC | Age: 79
End: 2019-01-18
Payer: MEDICARE

## 2019-01-18 VITALS
DIASTOLIC BLOOD PRESSURE: 66 MMHG | BODY MASS INDEX: 25.86 KG/M2 | SYSTOLIC BLOOD PRESSURE: 132 MMHG | HEART RATE: 70 BPM | RESPIRATION RATE: 12 BRPM | WEIGHT: 146 LBS | OXYGEN SATURATION: 98 %

## 2019-01-18 DIAGNOSIS — I50.21 ACUTE SYSTOLIC CONGESTIVE HEART FAILURE (HCC): Primary | ICD-10-CM

## 2019-01-18 DIAGNOSIS — I38 HEART VALVE PROBLEM: Chronic | ICD-10-CM

## 2019-01-18 DIAGNOSIS — I10 ESSENTIAL HYPERTENSION, BENIGN: Chronic | ICD-10-CM

## 2019-01-18 LAB
ANION GAP SERPL CALCULATED.3IONS-SCNC: 5 MMOL/L (ref 7–16)
B-TYPE NATRIURETIC PEPTIDE: 1320 PG/ML
BUN BLDV-MCNC: 39 MG/DL (ref 7–18)
CALCIUM SERPL-MCNC: 8.8 MG/DL (ref 8.5–10.1)
CHLORIDE BLD-SCNC: 111 MMOL/L (ref 98–107)
CO2: 29 MMOL/L (ref 21–32)
CREATININE + EGFR PANEL: 1.8 MG/DL (ref 0.6–1.3)
GFR AFRICAN AMERICAN: 33 ML/MIN/1.73M2
GFR NON-AFRICAN AMERICAN: 27 ML/MIN/1.73M2
GLUCOSE: 101 MG/DL (ref 74–106)
POTASSIUM SERPL-SCNC: 4.1 MMOL/L (ref 3.5–5.1)
SODIUM BLD-SCNC: 145 MMOL/L (ref 136–145)

## 2019-01-18 PROCEDURE — 1036F TOBACCO NON-USER: CPT | Performed by: FAMILY MEDICINE

## 2019-01-18 PROCEDURE — 4040F PNEUMOC VAC/ADMIN/RCVD: CPT | Performed by: FAMILY MEDICINE

## 2019-01-18 PROCEDURE — 1123F ACP DISCUSS/DSCN MKR DOCD: CPT | Performed by: FAMILY MEDICINE

## 2019-01-18 PROCEDURE — 1101F PT FALLS ASSESS-DOCD LE1/YR: CPT | Performed by: FAMILY MEDICINE

## 2019-01-18 PROCEDURE — G8427 DOCREV CUR MEDS BY ELIG CLIN: HCPCS | Performed by: FAMILY MEDICINE

## 2019-01-18 PROCEDURE — G8482 FLU IMMUNIZE ORDER/ADMIN: HCPCS | Performed by: FAMILY MEDICINE

## 2019-01-18 PROCEDURE — G8417 CALC BMI ABV UP PARAM F/U: HCPCS | Performed by: FAMILY MEDICINE

## 2019-01-18 PROCEDURE — 1090F PRES/ABSN URINE INCON ASSESS: CPT | Performed by: FAMILY MEDICINE

## 2019-01-18 PROCEDURE — G8399 PT W/DXA RESULTS DOCUMENT: HCPCS | Performed by: FAMILY MEDICINE

## 2019-01-18 PROCEDURE — G8598 ASA/ANTIPLAT THER USED: HCPCS | Performed by: FAMILY MEDICINE

## 2019-01-18 PROCEDURE — 99214 OFFICE O/P EST MOD 30 MIN: CPT | Performed by: FAMILY MEDICINE

## 2019-01-18 RX ORDER — TOPIRAMATE 100 MG/1
100 TABLET, FILM COATED ORAL 2 TIMES DAILY
Qty: 180 TABLET | Refills: 3 | Status: SHIPPED | OUTPATIENT
Start: 2019-01-18 | End: 2019-12-17

## 2019-01-23 ENCOUNTER — TELEPHONE (OUTPATIENT)
Dept: FAMILY MEDICINE CLINIC | Age: 79
End: 2019-01-23

## 2019-01-29 ENCOUNTER — TELEPHONE (OUTPATIENT)
Dept: CARDIOLOGY CLINIC | Age: 79
End: 2019-01-29

## 2019-02-07 ENCOUNTER — TELEPHONE (OUTPATIENT)
Dept: FAMILY MEDICINE CLINIC | Age: 79
End: 2019-02-07

## 2019-02-07 DIAGNOSIS — R20.0 NUMBNESS OF FEET: Primary | ICD-10-CM

## 2019-02-08 RX ORDER — AMLODIPINE BESYLATE 10 MG/1
10 TABLET ORAL DAILY
Qty: 90 TABLET | Refills: 0 | Status: SHIPPED | OUTPATIENT
Start: 2019-02-08 | End: 2019-09-11 | Stop reason: SDUPTHER

## 2019-02-19 ENCOUNTER — NURSE ONLY (OUTPATIENT)
Dept: CARDIOLOGY CLINIC | Age: 79
End: 2019-02-19
Payer: MEDICARE

## 2019-02-19 DIAGNOSIS — I49.5 SICK SINUS SYNDROME (HCC): ICD-10-CM

## 2019-02-19 DIAGNOSIS — Z95.0 PACEMAKER: ICD-10-CM

## 2019-02-19 PROCEDURE — 93294 REM INTERROG EVL PM/LDLS PM: CPT | Performed by: INTERNAL MEDICINE

## 2019-02-19 PROCEDURE — 93296 REM INTERROG EVL PM/IDS: CPT | Performed by: INTERNAL MEDICINE

## 2019-02-25 ENCOUNTER — TELEPHONE (OUTPATIENT)
Dept: CARDIOLOGY CLINIC | Age: 79
End: 2019-02-25

## 2019-03-29 ENCOUNTER — TELEPHONE (OUTPATIENT)
Dept: INTERNAL MEDICINE CLINIC | Age: 79
End: 2019-03-29

## 2019-04-15 ENCOUNTER — OFFICE VISIT (OUTPATIENT)
Dept: NEUROLOGY | Age: 79
End: 2019-04-15
Payer: MEDICARE

## 2019-04-15 ENCOUNTER — HOSPITAL ENCOUNTER (OUTPATIENT)
Age: 79
Discharge: HOME | End: 2019-04-15
Payer: MEDICARE

## 2019-04-15 VITALS
SYSTOLIC BLOOD PRESSURE: 144 MMHG | WEIGHT: 145 LBS | HEART RATE: 44 BPM | BODY MASS INDEX: 25.69 KG/M2 | DIASTOLIC BLOOD PRESSURE: 60 MMHG

## 2019-04-15 DIAGNOSIS — G60.9 IDIOPATHIC PERIPHERAL NEUROPATHY: Primary | ICD-10-CM

## 2019-04-15 DIAGNOSIS — G25.0 BENIGN ESSENTIAL TREMOR: ICD-10-CM

## 2019-04-15 DIAGNOSIS — G60.9 IDIOPATHIC PERIPHERAL NEUROPATHY: ICD-10-CM

## 2019-04-15 DIAGNOSIS — M79.2 NEUROPATHIC PAIN: ICD-10-CM

## 2019-04-15 PROCEDURE — 99204 OFFICE O/P NEW MOD 45 MIN: CPT | Performed by: PSYCHIATRY & NEUROLOGY

## 2019-04-15 PROCEDURE — G8598 ASA/ANTIPLAT THER USED: HCPCS | Performed by: PSYCHIATRY & NEUROLOGY

## 2019-04-15 PROCEDURE — G8417 CALC BMI ABV UP PARAM F/U: HCPCS | Performed by: PSYCHIATRY & NEUROLOGY

## 2019-04-15 PROCEDURE — G8399 PT W/DXA RESULTS DOCUMENT: HCPCS | Performed by: PSYCHIATRY & NEUROLOGY

## 2019-04-15 PROCEDURE — 36415 COLL VENOUS BLD VENIPUNCTURE: CPT

## 2019-04-15 PROCEDURE — 1123F ACP DISCUSS/DSCN MKR DOCD: CPT | Performed by: PSYCHIATRY & NEUROLOGY

## 2019-04-15 PROCEDURE — 1090F PRES/ABSN URINE INCON ASSESS: CPT | Performed by: PSYCHIATRY & NEUROLOGY

## 2019-04-15 PROCEDURE — 1036F TOBACCO NON-USER: CPT | Performed by: PSYCHIATRY & NEUROLOGY

## 2019-04-15 PROCEDURE — 4040F PNEUMOC VAC/ADMIN/RCVD: CPT | Performed by: PSYCHIATRY & NEUROLOGY

## 2019-04-15 PROCEDURE — 84155 ASSAY OF PROTEIN SERUM: CPT

## 2019-04-15 PROCEDURE — 86334 IMMUNOFIX E-PHORESIS SERUM: CPT

## 2019-04-15 PROCEDURE — 84165 PROTEIN E-PHORESIS SERUM: CPT

## 2019-04-15 PROCEDURE — G8427 DOCREV CUR MEDS BY ELIG CLIN: HCPCS | Performed by: PSYCHIATRY & NEUROLOGY

## 2019-04-15 RX ORDER — GABAPENTIN 100 MG/1
CAPSULE ORAL
Qty: 60 CAPSULE | Refills: 1 | Status: SHIPPED | OUTPATIENT
Start: 2019-04-15 | End: 2019-06-03 | Stop reason: SDUPTHER

## 2019-04-15 NOTE — PROGRESS NOTES
 Lupus Neg Hx      Social History     Socioeconomic History    Marital status:      Spouse name: Baldo Masters Number of children: 3    Years of education: 12    Highest education level: None   Occupational History    None   Social Needs    Financial resource strain: None    Food insecurity:     Worry: None     Inability: None    Transportation needs:     Medical: None     Non-medical: None   Tobacco Use    Smoking status: Former Smoker     Packs/day: 1.00     Years: 28.00     Pack years: 28.00     Types: Cigarettes     Last attempt to quit: 1987     Years since quittin.3    Smokeless tobacco: Never Used    Tobacco comment: H.O.smoking at age 15 / smoked up to 1 p.p.d / quit    Substance and Sexual Activity    Alcohol use: No     Alcohol/week: 0.0 oz    Drug use: No    Sexual activity: None   Lifestyle    Physical activity:     Days per week: None     Minutes per session: None    Stress: None   Relationships    Social connections:     Talks on phone: None     Gets together: None     Attends Qu[employer] service: None     Active member of club or organization: None     Attends meetings of clubs or organizations: None     Relationship status: None    Intimate partner violence:     Fear of current or ex partner: None     Emotionally abused: None     Physically abused: None     Forced sexual activity: None   Other Topics Concern    None   Social History Narrative    None       PHYSICAL EXAMINATION:  BP (!) 144/60   Pulse (!) 44   Wt 145 lb (65.8 kg)   BMI 25.69 kg/m²   Appearance: Well appearing, well nourished and in no distress  Mental Status Exam: Patient is alert, oriented to person, place and time.    Recent and remote memory is normal  Fund of Knowledge is normal  Attention/concentration is normal.   Speech : No dysarthria  Language : No aphasia  Funduscopic Exam: sharp disc margins  Cranial Nerves:   II: Visual fields:  Full to confrontation  III: Pupils:  equal, round, reactive to light  III,IV,VI: Extra Ocular Movements are intact. No nystagmus  V: Facial sensation is intact to pin prick and light touch  VII: Facial strength and movements: intact and symmetric smile,cheek puffing and eyebrow elevation  VIII: Hearing:  Intact to finger rub bilaterally  IX: Palate  elevation is symmetric  XI: Shoulder shrug is intact  XII: Tongue movements are normal  Motor:  Muscle tone and bulk are normal.   Strength is symmetrical 5/5 in all four extremities. Patient has tremors in both outstretched hands  Sensory: Decreased to light touch and  pin prick in distal bilateral lower extremities  Coordination:  Normal  Finger to Nose and Heel to Shin bilaterally    . Reflexes:  DTR 1 in the upper extremities and absent in the lower extremities  Plantar response: Flexor bilaterally  Gait: Gait and station is normal.  Romberg: negative  Vascular: No carotid bruit bilaterally        DATA:  LABS:  General Labs:    CBC:   Lab Results   Component Value Date    WBC 14.2 12/19/2018    RBC 5.52 12/19/2018    HGB 14.4 12/19/2018    HCT 45.5 12/19/2018    MCV 82.4 12/19/2018    MCH 26.0 12/19/2018    MCHC 31.6 12/19/2018    RDW 17.3 12/19/2018     12/19/2018    MPV 8.9 12/19/2018     BMP:    Lab Results   Component Value Date     01/18/2019    K 4.1 01/18/2019    K 4.1 08/08/2018     01/18/2019    CO2 29 01/18/2019    BUN 39 01/18/2019    LABALBU 3.7 10/24/2018    CREATININE 1.4 12/19/2018    CALCIUM 8.8 01/18/2019    GFRAA 33 01/18/2019    GFRAA 38 04/02/2013    LABGLOM 27 01/18/2019    GLUCOSE 101 01/18/2019     TSH and vitamin B12 levels were normal   IMPRESSION :  Idiopathic peripheral neuropathy  Neuropathic pain  Benign essential tremor  Atrial fibrillation, on Xarelto  Normal serum glucose, TSH and B12 levels  Patient apparently had been on amiodarone in the past which is known to cause neuropathy    RECOMMENDATIONS :  Discussed with patient  Check serum protein immunofixation  Trial of low-dose gabapentin   I will see her back in the office and do EMG and nerve conduction studies of both lower extremities          Please note a portion of this chart was generated using dragon dictation software. Although every effort was made to ensure the accuracy of this automated transcription, some errors in transcription may have occurred.

## 2019-04-15 NOTE — PATIENT INSTRUCTIONS
Please call with any questions or concerns:   [facility]  @ [phone]. LAB RESULTS:  Please obtain any labs or diagnostic tests as discussed today. You should call the office to check the results. Please allow  3 to 7 days for us to get these results. MEDICATION LIST:  Please bring an accurate list of your medications to every visit. APPOINTMENT CONFIRMATION:  We will call you the day before your scheduled appointment to confirm. If we are unable to reach you, you MUST call back by the end of the day to confirm the appointment or we may be forced to cancel.

## 2019-04-17 ENCOUNTER — TELEPHONE (OUTPATIENT)
Dept: NEUROLOGY | Age: 79
End: 2019-04-17

## 2019-04-17 LAB
ALBUMIN SERPL-MCNC: 3.1 G/DL (ref 3.1–4.9)
ALPHA-1-GLOBULIN: 0.4 G/DL (ref 0.2–0.4)
ALPHA-2-GLOBULIN: 1 G/DL (ref 0.4–1.1)
BETA GLOBULIN: 1.4 G/DL (ref 0.9–1.6)
GAMMA GLOBULIN: 0.7 G/DL (ref 0.6–1.8)
SPE/IFE INTERPRETATION: NORMAL
TOTAL PROTEIN: 6.6 G/DL (ref 6.4–8.2)

## 2019-04-17 NOTE — TELEPHONE ENCOUNTER
Pt phoned regarding taking Gabapentin with having dizziness . Per Dr Francisca Servin it is ok to take.   Called pt

## 2019-04-18 ENCOUNTER — OFFICE VISIT (OUTPATIENT)
Dept: FAMILY MEDICINE CLINIC | Age: 79
End: 2019-04-18
Payer: MEDICARE

## 2019-04-18 VITALS
RESPIRATION RATE: 12 BRPM | WEIGHT: 147 LBS | HEART RATE: 70 BPM | SYSTOLIC BLOOD PRESSURE: 106 MMHG | DIASTOLIC BLOOD PRESSURE: 66 MMHG | OXYGEN SATURATION: 97 % | BODY MASS INDEX: 26.04 KG/M2

## 2019-04-18 DIAGNOSIS — I63.9 CEREBROVASCULAR ACCIDENT (CVA), UNSPECIFIED MECHANISM (HCC): Primary | ICD-10-CM

## 2019-04-18 DIAGNOSIS — I48.0 PAROXYSMAL ATRIAL FIBRILLATION (HCC): Chronic | ICD-10-CM

## 2019-04-18 DIAGNOSIS — I50.22 CHRONIC SYSTOLIC CONGESTIVE HEART FAILURE (HCC): ICD-10-CM

## 2019-04-18 DIAGNOSIS — M15.9 PRIMARY OSTEOARTHRITIS INVOLVING MULTIPLE JOINTS: ICD-10-CM

## 2019-04-18 DIAGNOSIS — I10 ESSENTIAL HYPERTENSION, BENIGN: Chronic | ICD-10-CM

## 2019-04-18 PROCEDURE — 99214 OFFICE O/P EST MOD 30 MIN: CPT | Performed by: FAMILY MEDICINE

## 2019-04-18 PROCEDURE — 1090F PRES/ABSN URINE INCON ASSESS: CPT | Performed by: FAMILY MEDICINE

## 2019-04-18 PROCEDURE — 1036F TOBACCO NON-USER: CPT | Performed by: FAMILY MEDICINE

## 2019-04-18 PROCEDURE — G8598 ASA/ANTIPLAT THER USED: HCPCS | Performed by: FAMILY MEDICINE

## 2019-04-18 PROCEDURE — 4040F PNEUMOC VAC/ADMIN/RCVD: CPT | Performed by: FAMILY MEDICINE

## 2019-04-18 PROCEDURE — G8417 CALC BMI ABV UP PARAM F/U: HCPCS | Performed by: FAMILY MEDICINE

## 2019-04-18 PROCEDURE — 1123F ACP DISCUSS/DSCN MKR DOCD: CPT | Performed by: FAMILY MEDICINE

## 2019-04-18 PROCEDURE — G8427 DOCREV CUR MEDS BY ELIG CLIN: HCPCS | Performed by: FAMILY MEDICINE

## 2019-04-18 PROCEDURE — G8399 PT W/DXA RESULTS DOCUMENT: HCPCS | Performed by: FAMILY MEDICINE

## 2019-04-18 RX ORDER — HYDROCODONE BITARTRATE AND ACETAMINOPHEN 5; 325 MG/1; MG/1
1 TABLET ORAL EVERY 4 HOURS PRN
Qty: 120 TABLET | Refills: 0 | Status: ON HOLD | OUTPATIENT
Start: 2019-04-18 | End: 2019-05-20 | Stop reason: HOSPADM

## 2019-04-18 RX ORDER — LEVOTHYROXINE SODIUM 112 UG/1
TABLET ORAL
Qty: 90 TABLET | Refills: 1 | Status: SHIPPED | OUTPATIENT
Start: 2019-04-18 | End: 2019-09-11 | Stop reason: SDUPTHER

## 2019-04-18 RX ORDER — TORSEMIDE 20 MG/1
TABLET ORAL
Qty: 60 TABLET | Refills: 5 | Status: ON HOLD | OUTPATIENT
Start: 2019-04-18 | End: 2019-11-17 | Stop reason: SDUPTHER

## 2019-04-18 RX ORDER — ALLOPURINOL 300 MG/1
300 TABLET ORAL DAILY
Qty: 90 TABLET | Refills: 1
Start: 2019-04-18 | End: 2019-04-26 | Stop reason: SDUPTHER

## 2019-04-18 RX ORDER — LOSARTAN POTASSIUM 25 MG/1
25 TABLET ORAL DAILY
Qty: 90 TABLET | Refills: 1 | Status: SHIPPED | OUTPATIENT
Start: 2019-04-18 | End: 2019-07-17

## 2019-04-18 ASSESSMENT — PATIENT HEALTH QUESTIONNAIRE - PHQ9
SUM OF ALL RESPONSES TO PHQ9 QUESTIONS 1 & 2: 0
SUM OF ALL RESPONSES TO PHQ QUESTIONS 1-9: 0
1. LITTLE INTEREST OR PLEASURE IN DOING THINGS: 0
SUM OF ALL RESPONSES TO PHQ QUESTIONS 1-9: 0
2. FEELING DOWN, DEPRESSED OR HOPELESS: 0

## 2019-04-18 NOTE — PROGRESS NOTES
Subjective:      Patient ID: Julianna Mazariegos is a 66 y.o. female. CC: Patient presents for re-evaluation of chronic health problems including osteoarthritis pain, heart failure, status post stroke, concerns about memory and hypertension. HPI pt is here for a follow up, med refill, and will like to discuss Losartan med as she doesn't know if you want her on that all the time. Pt also stated that taking 2 torsemide is better for her, but is not sure if this will be safe. Pt stated that she knows this is hard on her kidneys. Patient has noted that there is times when she starts retaining fluid or starts becoming more short of breath. She has taken additional Demadex those days and her symptoms have significantly improved. She was concerned about some memory loss issue 5 herself at times not able to get words out although she knows what she wants to say. She has a lot of stress in her life with taking care of her  who has significant health problems. Patient takes care of all the household items including checkbook and grocery shopping. She has no issues with these tasks. She denies having any chest pain. Patient is very compliant with medications with no adverse reactions.      Review of Systems  Patient Active Problem List   Diagnosis    Paroxysmal atrial fibrillation (HCC)    Essential hypertension, benign    Mixed hyperlipidemia    Chronic gouty arthropathy    Hypertension    Acquired hypothyroidism    Arthritis    Heart valve problem    Restrictive lung disease    Sick sinus syndrome (HCC)    Allergic rhinitis    Fibrocystic breast    Cerebrovascular accident (CVA) ([address]    Pacemaker    Typical atrial flutter (HCC)    Primary osteoarthritis involving multiple joints    Vitamin D deficiency    Tremor    Chronic total occlusion of native coronary artery    Age related osteoporosis    CAD S/P percutaneous coronary angioplasty    Acute systolic congestive heart failure ([address] Outpatient Medications Marked as Taking for the 4/18/19 encounter (Office Visit) with Suella Ahumada, MD   Medication Sig Dispense Refill    gabapentin (NEURONTIN) 100 MG capsule Take one capsule at night for 1 week and then 2 capsules at night 60 capsule 1    amLODIPine (NORVASC) 10 MG tablet Take 1 tablet by mouth daily 90 tablet 0    topiramate (TOPAMAX) 100 MG tablet Take 1 tablet by mouth 2 times daily 180 tablet 3    losartan (COZAAR) 25 MG tablet Take 1 tablet by mouth daily 30 tablet 3    torsemide (DEMADEX) 20 MG tablet Take 1 tablet by mouth daily 30 tablet 2    rivaroxaban (XARELTO) 15 MG TABS tablet  90 tablet 3    clopidogrel (PLAVIX) 75 MG tablet Take 1 tablet by mouth daily 90 tablet 2    meclizine (ANTIVERT) 12.5 MG tablet Take 12.5 mg by mouth 3 times daily as needed for Dizziness or Nausea      allopurinol (ZYLOPRIM) 300 MG tablet Take 300 mg by mouth daily      levothyroxine (SYNTHROID) 112 MCG tablet TAKE 1 TABLET DAILY 90 tablet 1    Melatonin 5 MG CAPS 1-2 pills nightly (Patient taking differently: nightly as needed 1-2 pills nightly) 60 capsule 5    allopurinol (ZYLOPRIM) 100 MG tablet Take 1 tablet by mouth daily Take along with 300mg tablet for total of 400mg.  90 tablet 3    tiZANidine (ZANAFLEX) 4 MG tablet Take 1 tablet by mouth every 6 hours as needed (prn) 100 tablet 0    zoledronic acid (RECLAST) 5 MG/100ML SOLN Infuse 5 mg intravenously once IV ,yearly      vitamin D (ERGOCALCIFEROL) 46495 units CAPS capsule Take 1 capsule by mouth once a week 12 capsule 1    nitroGLYCERIN (NITROLINGUAL) 0.4 MG/SPRAY 0.4 mg spray Place 1 spray under the tongue every 5 minutes as needed for Chest pain 1 Bottle 3    metoprolol tartrate (LOPRESSOR) 100 MG tablet Take 1 tablet by mouth 2 times daily 180 tablet 3       Allergies   Allergen Reactions    Aspirin Nausea Only    Ativan [Lorazepam] Other (See Comments)     hallucinations    Diltiazem Anaphylaxis    Diltiazem Hcl Anaphylaxis    Sulfa Antibiotics Rash and Hives    Dabigatran Nausea Only     And indigestion  And indigestion    Aka Pradaxa    Dabigatran Etexilate Mesylate Other (See Comments)     indigestion    Dabigatran Etexilate Mesylate Nausea Only     And indigestion    Iodides Other (See Comments)     Other reaction(s): Other - comment required  Kidney disease  Kidney disease, pt denies SG 10/3/18    Mysoline [Primidone]     Nsaids     Other Other (See Comments)     Nitroglycerin patches causes severe headaches       Social History     Tobacco Use    Smoking status: Former Smoker     Packs/day: 1.00     Years: 28.00     Pack years: 28.00     Types: Cigarettes     Last attempt to quit: 1987     Years since quittin.3    Smokeless tobacco: Never Used    Tobacco comment: H.O.smoking at age 15 / smoked up to 1 p.p.d / quit    Substance Use Topics    Alcohol use: No     Alcohol/week: 0.0 oz       /66 (Site: Right Upper Arm, Position: Sitting, Cuff Size: Medium Adult)   Pulse 70   Resp 12   Wt 147 lb (66.7 kg)   SpO2 97%   BMI 26.04 kg/m²     Objective:   Physical Exam   Constitutional: She is oriented to person, place, and time. She appears well-developed and well-nourished. She is cooperative. No distress. Neck: Carotid bruit is not present. Cardiovascular: Normal rate, regular rhythm and normal heart sounds. No murmur heard. Pulses:       Dorsalis pedis pulses are 2+ on the right side, and 2+ on the left side. Posterior tibial pulses are 2+ on the right side, and 2+ on the left side. Pulmonary/Chest: Effort normal and breath sounds normal.   Musculoskeletal: Normal range of motion. She exhibits no edema or tenderness. Lumbar back: She exhibits no swelling, no edema and no deformity. Right upper leg: She exhibits no tenderness, no swelling and no deformity. Left upper leg: She exhibits no tenderness, no swelling and no deformity.    Neurological: She is alert and oriented to person, place, and time. She has normal strength. No sensory deficit. Reflex Scores:       Patellar reflexes are 2+ on the right side and 2+ on the left side. Achilles reflexes are 2+ on the right side and 2+ on the left side. Skin: No rash noted. Psychiatric: She has a normal mood and affect. Her speech is normal and behavior is normal. Judgment and thought content normal. Cognition and memory are normal.       Assessment:      David Mac was seen today for follow-up. Diagnoses and all orders for this visit:    Cerebrovascular accident (CVA), unspecified mechanism ([facility]    Primary osteoarthritis involving multiple joints  -     HYDROcodone-acetaminophen (NORCO) 5-325 MG per tablet; Take 1 tablet by mouth every 4 hours as needed for Pain for up to 30 days. Paroxysmal atrial fibrillation (HCC)    Essential hypertension, benign    Chronic systolic congestive heart failure ([facility]    Other orders  -     levothyroxine (SYNTHROID) 112 MCG tablet; TAKE 1 TABLET DAILY  -     torsemide (DEMADEX) 20 MG tablet; 1-2 qd  -     losartan (COZAAR) 25 MG tablet; Take 1 tablet by mouth daily  -     allopurinol (ZYLOPRIM) 300 MG tablet; Take 1 tablet by mouth daily    OARRS report checked          Plan:      Pt appears stable & labs ordered. Adjustments of medication will be done after laboratory testing results available. Patient was advised to maintain a low-salt diet and she was instructed to take the Demadex at 20 mg daily in the days that she knows her weight is going up or she starts noticing fluid retention she certainly can take an additional 20 mg strength. Patient was instructed to maintain this for the next 3 days. Patient received counseling on the following healthy behaviors: nutrition and exercise     Patient given educational materials     Health maintenance updated    Discussed use, benefit, and side effects of prescribed medications.   Barriers to medication compliance addressed. All patient questions answered. Pt voiced understanding. Patient needs RTC in 3 months. Please note that this chart was generated using Dragon dictation software. Although every effort was made to ensure the accuracy of this automated transcription, some errors in transcription may have occurred.

## 2019-04-19 PROBLEM — I50.22 CHRONIC SYSTOLIC CONGESTIVE HEART FAILURE (HCC): Status: ACTIVE | Noted: 2018-12-28

## 2019-04-24 ENCOUNTER — OFFICE VISIT (OUTPATIENT)
Dept: RHEUMATOLOGY | Age: 79
End: 2019-04-24
Payer: MEDICARE

## 2019-04-24 ENCOUNTER — TELEPHONE (OUTPATIENT)
Dept: FAMILY MEDICINE CLINIC | Age: 79
End: 2019-04-24

## 2019-04-24 VITALS
BODY MASS INDEX: 26.29 KG/M2 | SYSTOLIC BLOOD PRESSURE: 128 MMHG | HEIGHT: 63 IN | DIASTOLIC BLOOD PRESSURE: 72 MMHG | WEIGHT: 148.38 LBS | HEART RATE: 78 BPM

## 2019-04-24 DIAGNOSIS — Z79.899 ENCOUNTER FOR LONG-TERM (CURRENT) USE OF MEDICATIONS: ICD-10-CM

## 2019-04-24 DIAGNOSIS — M1A.00X0 IDIOPATHIC CHRONIC GOUT WITHOUT TOPHUS, UNSPECIFIED SITE: Primary | ICD-10-CM

## 2019-04-24 LAB
ALBUMIN SERPL-MCNC: 3.7 G/DL (ref 3.4–5)
ALP BLD-CCNC: 95 U/L (ref 40–129)
ALT SERPL-CCNC: 15 U/L (ref 10–40)
AST SERPL-CCNC: 16 U/L (ref 15–37)
BILIRUB SERPL-MCNC: 0.3 MG/DL (ref 0–1)
BILIRUBIN DIRECT: <0.2 MG/DL (ref 0–0.3)
BILIRUBIN, INDIRECT: NORMAL MG/DL (ref 0–1)
CREAT SERPL-MCNC: 1.4 MG/DL (ref 0.6–1.2)
GFR AFRICAN AMERICAN: 44
GFR NON-AFRICAN AMERICAN: 36
TOTAL PROTEIN: 6.6 G/DL (ref 6.4–8.2)
URIC ACID, SERUM: 9.2 MG/DL (ref 2.6–6)

## 2019-04-24 PROCEDURE — 1036F TOBACCO NON-USER: CPT | Performed by: INTERNAL MEDICINE

## 2019-04-24 PROCEDURE — G8399 PT W/DXA RESULTS DOCUMENT: HCPCS | Performed by: INTERNAL MEDICINE

## 2019-04-24 PROCEDURE — 1090F PRES/ABSN URINE INCON ASSESS: CPT | Performed by: INTERNAL MEDICINE

## 2019-04-24 PROCEDURE — G8417 CALC BMI ABV UP PARAM F/U: HCPCS | Performed by: INTERNAL MEDICINE

## 2019-04-24 PROCEDURE — 4040F PNEUMOC VAC/ADMIN/RCVD: CPT | Performed by: INTERNAL MEDICINE

## 2019-04-24 PROCEDURE — G8598 ASA/ANTIPLAT THER USED: HCPCS | Performed by: INTERNAL MEDICINE

## 2019-04-24 PROCEDURE — 99213 OFFICE O/P EST LOW 20 MIN: CPT | Performed by: INTERNAL MEDICINE

## 2019-04-24 PROCEDURE — 1123F ACP DISCUSS/DSCN MKR DOCD: CPT | Performed by: INTERNAL MEDICINE

## 2019-04-24 PROCEDURE — G8427 DOCREV CUR MEDS BY ELIG CLIN: HCPCS | Performed by: INTERNAL MEDICINE

## 2019-04-24 NOTE — PROGRESS NOTES
Subjective:      Patient ID: Alhaji Crawford is a 66 y.o. female. HPI  Patient returns for follow-up gout And osteoporosis. She does not want to have another Reclast infusion. She continues on allopurinol 400 mg a day. Her last uric acid was 4.5  Review of Systems  Denies attack of gout denies kidney stones denies rash  Objective:   Physical Exam  /72   Pulse 78   Ht 5' 3\" (1.6 m)   Wt 148 lb 6 oz (67.3 kg)   BMI 26.28 kg/m²   Alert female in no acute distress. musculoskeletal exam no synovitis no tophi  Assessment:      Gout      Plan:      Blood work was obtained today to monitor for adverse drug reactions and effectiveness of therapy. I'll see the patient back in 1 year's time.         Luis Licona MD

## 2019-04-24 NOTE — TELEPHONE ENCOUNTER
Patient is calling to see if we can locate the name of the surgeon she saw at [facility] that did the surgery on her tibia 2-3 years ago. She states she is starting to have pain again and wants to reach back out to the surgeon . Please advise .

## 2019-04-26 ENCOUNTER — OFFICE VISIT (OUTPATIENT)
Dept: ORTHOPEDIC SURGERY | Age: 79
End: 2019-04-26
Payer: MEDICARE

## 2019-04-26 ENCOUNTER — TELEPHONE (OUTPATIENT)
Dept: ORTHOPEDIC SURGERY | Age: 79
End: 2019-04-26

## 2019-04-26 VITALS
HEART RATE: 70 BPM | WEIGHT: 145.2 LBS | DIASTOLIC BLOOD PRESSURE: 60 MMHG | HEIGHT: 63 IN | RESPIRATION RATE: 16 BRPM | BODY MASS INDEX: 25.73 KG/M2 | SYSTOLIC BLOOD PRESSURE: 115 MMHG

## 2019-04-26 DIAGNOSIS — S72.002S: ICD-10-CM

## 2019-04-26 DIAGNOSIS — M54.16 LUMBAR RADICULOPATHY: ICD-10-CM

## 2019-04-26 DIAGNOSIS — M48.062 SPINAL STENOSIS OF LUMBAR REGION WITH NEUROGENIC CLAUDICATION: Primary | ICD-10-CM

## 2019-04-26 PROCEDURE — 1123F ACP DISCUSS/DSCN MKR DOCD: CPT | Performed by: ORTHOPAEDIC SURGERY

## 2019-04-26 PROCEDURE — 99213 OFFICE O/P EST LOW 20 MIN: CPT | Performed by: ORTHOPAEDIC SURGERY

## 2019-04-26 PROCEDURE — 1036F TOBACCO NON-USER: CPT | Performed by: ORTHOPAEDIC SURGERY

## 2019-04-26 PROCEDURE — G8428 CUR MEDS NOT DOCUMENT: HCPCS | Performed by: ORTHOPAEDIC SURGERY

## 2019-04-26 PROCEDURE — 1090F PRES/ABSN URINE INCON ASSESS: CPT | Performed by: ORTHOPAEDIC SURGERY

## 2019-04-26 PROCEDURE — 4040F PNEUMOC VAC/ADMIN/RCVD: CPT | Performed by: ORTHOPAEDIC SURGERY

## 2019-04-26 PROCEDURE — G8417 CALC BMI ABV UP PARAM F/U: HCPCS | Performed by: ORTHOPAEDIC SURGERY

## 2019-04-26 PROCEDURE — G8598 ASA/ANTIPLAT THER USED: HCPCS | Performed by: ORTHOPAEDIC SURGERY

## 2019-04-26 PROCEDURE — G8399 PT W/DXA RESULTS DOCUMENT: HCPCS | Performed by: ORTHOPAEDIC SURGERY

## 2019-04-26 RX ORDER — ALLOPURINOL 300 MG/1
300 TABLET ORAL DAILY
Qty: 90 TABLET | Refills: 1 | Status: SHIPPED | OUTPATIENT
Start: 2019-04-26 | End: 2019-09-11 | Stop reason: SDUPTHER

## 2019-04-26 NOTE — LETTER
Radiology Procedure Request Form:    Patient Name:  Jermain Becerril  Patient :  1940     Patient MR#:  [id number]    Patient Phone:  [phone] (home)    Alt. Patient Phone:    Patient Address:  [address] [place] [address]    Location:  Patient's choice  Surgeon:  Juvenal White M.D.    PCP:  Garcia Guy MD  Insurance:    Payor/Plan Subscr  Sex Relation Sub. Ins. ID Effective Group Num   1. MEDICARE - MENohemy Renlalitha 1940 Female  [id number] 1/1/15                                    [address]    2.  ADONAY Bass 3/25/1937 Male  [id number] 1/1/15 [id number]                                    [facility]       Diagnosis:  Left hip osteoarthritis M16.12   Procedure:  Fluorscopically guided Left hip injection performed by radiology        Scheduling Instruction:  elective  Requested Date:       Patient Arrival Time:    Comments:  Please inject 80mg Depo-Medrol & 10cc 0.25% Marcaine                                                           19

## 2019-04-26 NOTE — TELEPHONE ENCOUNTER
Per Dr Arturo Alanis, he is canceling the order for IR L hip injection. He recommends following up with Dr Gloria Vargas instead, as pt's symptoms may be coming from her lumbar spine. I spoke to pt and let her know. Patient will call Dr Shu Sorto office for an appt.

## 2019-04-26 NOTE — TELEPHONE ENCOUNTER
allopurinol (ZYLOPRIM) 300 MG tablet 90 tablet 1 4/18/2019     Sig - Route:  Take 1 tablet by mouth daily - Oral      Pharmacy:  [address] [place] [address] , [address] [facility] [phone] - F [phone]
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
## 2019-05-06 ENCOUNTER — TELEPHONE (OUTPATIENT)
Dept: INTERNAL MEDICINE CLINIC | Age: 79
End: 2019-05-06

## 2019-05-06 DIAGNOSIS — M1A.00X0 IDIOPATHIC CHRONIC GOUT WITHOUT TOPHUS, UNSPECIFIED SITE: Primary | ICD-10-CM

## 2019-05-06 NOTE — TELEPHONE ENCOUNTER
Patient states Dr Rufus Robles called her about her uric acid level. She had question of whether she was to come in for appointment or just for lab test in 1 month. I read Dr Arthur Schofield note to her and she voiced understanding that she is to come in for lab work in 1 month. She states she will be coming in on 5/24/19 to get the test.  The uric acid level has not been ordered yet.

## 2019-05-07 RX ORDER — RIVAROXABAN 15 MG/1
TABLET, FILM COATED ORAL
Qty: 90 TABLET | Refills: 3 | Status: SHIPPED | OUTPATIENT
Start: 2019-05-07 | End: 2019-12-17

## 2019-05-13 ENCOUNTER — TELEPHONE (OUTPATIENT)
Dept: NEUROLOGY | Age: 79
End: 2019-05-13

## 2019-05-13 NOTE — TELEPHONE ENCOUNTER
Pt called stating she stood up from her chair on Saturday night and felt very dizzy and off balance. Pt stated she did fall to the floor because of how dizzy she was. Pt also stated she is having very bad headaches. She stated she was seen in April and started Gabapentin 100mg 2 caps at night. Pt would like to know if this new medication could have been the issue?

## 2019-05-13 NOTE — TELEPHONE ENCOUNTER
Spoke with Dr. Chandni Ayala and he stated that he does not think the Gabapentin is causing the symptoms since it is a very low dose. Dr. Chandni Ayala stated the patient can stop the Gabapentin for about 4 to 5 days and call us back to let us know if the symptoms resolve. Called pt and informed of this. Pt verbalized understanding.

## 2019-05-17 ENCOUNTER — APPOINTMENT (OUTPATIENT)
Dept: GENERAL RADIOLOGY | Age: 79
DRG: 281 | End: 2019-05-17
Payer: MEDICARE

## 2019-05-17 ENCOUNTER — HOSPITAL ENCOUNTER (INPATIENT)
Age: 79
LOS: 3 days | Discharge: HOME | DRG: 281 | End: 2019-05-20
Attending: EMERGENCY MEDICINE | Admitting: INTERNAL MEDICINE
Payer: MEDICARE

## 2019-05-17 DIAGNOSIS — I21.4 NSTEMI (NON-ST ELEVATED MYOCARDIAL INFARCTION) (HCC): Primary | ICD-10-CM

## 2019-05-17 LAB
ANION GAP SERPL CALCULATED.3IONS-SCNC: 9 MMOL/L (ref 3–16)
APTT: 44.1 SEC (ref 26–36)
BASOPHILS ABSOLUTE: 0.1 K/UL (ref 0–0.2)
BASOPHILS RELATIVE PERCENT: 1.1 %
BUN BLDV-MCNC: 43 MG/DL (ref 7–20)
CALCIUM SERPL-MCNC: 9.1 MG/DL (ref 8.3–10.6)
CHLORIDE BLD-SCNC: 106 MMOL/L (ref 99–110)
CO2: 26 MMOL/L (ref 21–32)
CREAT SERPL-MCNC: 1.6 MG/DL (ref 0.6–1.2)
EOSINOPHILS ABSOLUTE: 0.4 K/UL (ref 0–0.6)
EOSINOPHILS RELATIVE PERCENT: 4.3 %
GFR AFRICAN AMERICAN: 38
GFR NON-AFRICAN AMERICAN: 31
GLUCOSE BLD-MCNC: 119 MG/DL (ref 70–99)
HCT VFR BLD CALC: 43.4 % (ref 36–48)
HEMOGLOBIN: 14 G/DL (ref 12–16)
LYMPHOCYTES ABSOLUTE: 1.2 K/UL (ref 1–5.1)
LYMPHOCYTES RELATIVE PERCENT: 11.9 %
MCH RBC QN AUTO: 25.8 PG (ref 26–34)
MCHC RBC AUTO-ENTMCNC: 32.3 G/DL (ref 31–36)
MCV RBC AUTO: 80 FL (ref 80–100)
MONOCYTES ABSOLUTE: 0.5 K/UL (ref 0–1.3)
MONOCYTES RELATIVE PERCENT: 5.5 %
NEUTROPHILS ABSOLUTE: 7.5 K/UL (ref 1.7–7.7)
NEUTROPHILS RELATIVE PERCENT: 77.2 %
PDW BLD-RTO: 18.7 % (ref 12.4–15.4)
PLATELET # BLD: 310 K/UL (ref 135–450)
PMV BLD AUTO: 8.5 FL (ref 5–10.5)
POTASSIUM REFLEX MAGNESIUM: 4 MMOL/L (ref 3.5–5.1)
PRO-BNP: 797 PG/ML (ref 0–449)
RBC # BLD: 5.43 M/UL (ref 4–5.2)
SODIUM BLD-SCNC: 141 MMOL/L (ref 136–145)
TROPONIN: 0.22 NG/ML
WBC # BLD: 9.7 K/UL (ref 4–11)

## 2019-05-17 PROCEDURE — 85730 THROMBOPLASTIN TIME PARTIAL: CPT

## 2019-05-17 PROCEDURE — 6360000002 HC RX W HCPCS: Performed by: NURSE PRACTITIONER

## 2019-05-17 PROCEDURE — 85025 COMPLETE CBC W/AUTO DIFF WBC: CPT

## 2019-05-17 PROCEDURE — 80048 BASIC METABOLIC PNL TOTAL CA: CPT

## 2019-05-17 PROCEDURE — 93005 ELECTROCARDIOGRAM TRACING: CPT | Performed by: EMERGENCY MEDICINE

## 2019-05-17 PROCEDURE — 6370000000 HC RX 637 (ALT 250 FOR IP): Performed by: NURSE PRACTITIONER

## 2019-05-17 PROCEDURE — 84484 ASSAY OF TROPONIN QUANT: CPT

## 2019-05-17 PROCEDURE — 2580000003 HC RX 258: Performed by: NURSE PRACTITIONER

## 2019-05-17 PROCEDURE — 71045 X-RAY EXAM CHEST 1 VIEW: CPT

## 2019-05-17 PROCEDURE — 99285 EMERGENCY DEPT VISIT HI MDM: CPT

## 2019-05-17 PROCEDURE — 2060000000 HC ICU INTERMEDIATE R&B

## 2019-05-17 PROCEDURE — 83880 ASSAY OF NATRIURETIC PEPTIDE: CPT

## 2019-05-17 PROCEDURE — 96374 THER/PROPH/DIAG INJ IV PUSH: CPT

## 2019-05-17 RX ORDER — ONDANSETRON 2 MG/ML
4 INJECTION INTRAMUSCULAR; INTRAVENOUS EVERY 6 HOURS PRN
Status: DISCONTINUED | OUTPATIENT
Start: 2019-05-17 | End: 2019-05-20 | Stop reason: HOSPADM

## 2019-05-17 RX ORDER — SODIUM CHLORIDE 0.9 % (FLUSH) 0.9 %
10 SYRINGE (ML) INJECTION EVERY 12 HOURS SCHEDULED
Status: DISCONTINUED | OUTPATIENT
Start: 2019-05-17 | End: 2019-05-20 | Stop reason: HOSPADM

## 2019-05-17 RX ORDER — CLOPIDOGREL BISULFATE 75 MG/1
75 TABLET ORAL DAILY
Status: DISCONTINUED | OUTPATIENT
Start: 2019-05-18 | End: 2019-05-20 | Stop reason: HOSPADM

## 2019-05-17 RX ORDER — HEPARIN SODIUM 1000 [USP'U]/ML
80 INJECTION, SOLUTION INTRAVENOUS; SUBCUTANEOUS ONCE
Status: COMPLETED | OUTPATIENT
Start: 2019-05-17 | End: 2019-05-17

## 2019-05-17 RX ORDER — POTASSIUM CHLORIDE 7.45 MG/ML
10 INJECTION INTRAVENOUS PRN
Status: DISCONTINUED | OUTPATIENT
Start: 2019-05-17 | End: 2019-05-17

## 2019-05-17 RX ORDER — BISACODYL 10 MG
10 SUPPOSITORY, RECTAL RECTAL DAILY PRN
Status: DISCONTINUED | OUTPATIENT
Start: 2019-05-17 | End: 2019-05-20 | Stop reason: HOSPADM

## 2019-05-17 RX ORDER — POTASSIUM CHLORIDE 20 MEQ/1
40 TABLET, EXTENDED RELEASE ORAL PRN
Status: DISCONTINUED | OUTPATIENT
Start: 2019-05-17 | End: 2019-05-17

## 2019-05-17 RX ORDER — NITROGLYCERIN 0.4 MG/1
0.4 TABLET SUBLINGUAL EVERY 5 MIN PRN
Status: DISCONTINUED | OUTPATIENT
Start: 2019-05-17 | End: 2019-05-20 | Stop reason: HOSPADM

## 2019-05-17 RX ORDER — LOSARTAN POTASSIUM 25 MG/1
25 TABLET ORAL DAILY
Status: DISCONTINUED | OUTPATIENT
Start: 2019-05-18 | End: 2019-05-20 | Stop reason: HOSPADM

## 2019-05-17 RX ORDER — ACETAMINOPHEN 325 MG/1
650 TABLET ORAL EVERY 4 HOURS PRN
Status: DISCONTINUED | OUTPATIENT
Start: 2019-05-17 | End: 2019-05-20 | Stop reason: HOSPADM

## 2019-05-17 RX ORDER — HEPARIN SODIUM 10000 [USP'U]/100ML
18 INJECTION, SOLUTION INTRAVENOUS CONTINUOUS
Status: DISCONTINUED | OUTPATIENT
Start: 2019-05-17 | End: 2019-05-20 | Stop reason: HOSPADM

## 2019-05-17 RX ORDER — ATORVASTATIN CALCIUM 80 MG/1
40 TABLET, FILM COATED ORAL NIGHTLY
Status: DISCONTINUED | OUTPATIENT
Start: 2019-05-17 | End: 2019-05-20 | Stop reason: HOSPADM

## 2019-05-17 RX ORDER — HEPARIN SODIUM 1000 [USP'U]/ML
80 INJECTION, SOLUTION INTRAVENOUS; SUBCUTANEOUS PRN
Status: DISCONTINUED | OUTPATIENT
Start: 2019-05-17 | End: 2019-05-20 | Stop reason: HOSPADM

## 2019-05-17 RX ORDER — LEVOTHYROXINE SODIUM 0.1 MG/1
100 TABLET ORAL
Status: DISCONTINUED | OUTPATIENT
Start: 2019-05-18 | End: 2019-05-20 | Stop reason: HOSPADM

## 2019-05-17 RX ORDER — HEPARIN SODIUM 1000 [USP'U]/ML
40 INJECTION, SOLUTION INTRAVENOUS; SUBCUTANEOUS PRN
Status: DISCONTINUED | OUTPATIENT
Start: 2019-05-17 | End: 2019-05-20 | Stop reason: HOSPADM

## 2019-05-17 RX ORDER — GABAPENTIN 100 MG/1
100 CAPSULE ORAL NIGHTLY
Status: DISCONTINUED | OUTPATIENT
Start: 2019-05-17 | End: 2019-05-19

## 2019-05-17 RX ORDER — METOPROLOL TARTRATE 50 MG/1
50 TABLET, FILM COATED ORAL 2 TIMES DAILY
Status: DISCONTINUED | OUTPATIENT
Start: 2019-05-17 | End: 2019-05-20 | Stop reason: HOSPADM

## 2019-05-17 RX ORDER — LEVOTHYROXINE SODIUM 0.1 MG/1
100 TABLET ORAL DAILY
Status: DISCONTINUED | OUTPATIENT
Start: 2019-05-18 | End: 2019-05-17 | Stop reason: SDUPTHER

## 2019-05-17 RX ORDER — SODIUM CHLORIDE 0.9 % (FLUSH) 0.9 %
10 SYRINGE (ML) INJECTION PRN
Status: DISCONTINUED | OUTPATIENT
Start: 2019-05-17 | End: 2019-05-20 | Stop reason: HOSPADM

## 2019-05-17 RX ORDER — TIZANIDINE 4 MG/1
4 TABLET ORAL EVERY 6 HOURS PRN
Status: DISCONTINUED | OUTPATIENT
Start: 2019-05-17 | End: 2019-05-20 | Stop reason: HOSPADM

## 2019-05-17 RX ORDER — AMLODIPINE BESYLATE 5 MG/1
10 TABLET ORAL DAILY
Status: DISCONTINUED | OUTPATIENT
Start: 2019-05-18 | End: 2019-05-20 | Stop reason: HOSPADM

## 2019-05-17 RX ORDER — TOPIRAMATE 100 MG/1
100 TABLET, FILM COATED ORAL 2 TIMES DAILY
Status: DISCONTINUED | OUTPATIENT
Start: 2019-05-17 | End: 2019-05-20 | Stop reason: HOSPADM

## 2019-05-17 RX ORDER — LISINOPRIL 10 MG/1
5 TABLET ORAL DAILY
Status: DISCONTINUED | OUTPATIENT
Start: 2019-05-18 | End: 2019-05-20 | Stop reason: HOSPADM

## 2019-05-17 RX ADMIN — Medication 10 ML: at 23:08

## 2019-05-17 RX ADMIN — METOPROLOL TARTRATE 50 MG: 50 TABLET, FILM COATED ORAL at 23:07

## 2019-05-17 RX ADMIN — GABAPENTIN 100 MG: 100 CAPSULE ORAL at 23:08

## 2019-05-17 RX ADMIN — HEPARIN SODIUM 5190 UNITS: 1000 INJECTION, SOLUTION INTRAVENOUS; SUBCUTANEOUS at 19:11

## 2019-05-17 RX ADMIN — TOPIRAMATE 100 MG: 100 TABLET, FILM COATED ORAL at 23:07

## 2019-05-17 RX ADMIN — HEPARIN SODIUM AND DEXTROSE 18 UNITS/KG/HR: 10000; 5 INJECTION INTRAVENOUS at 19:12

## 2019-05-17 ASSESSMENT — ENCOUNTER SYMPTOMS
NAUSEA: 0
DIARRHEA: 0
CONSTIPATION: 0
SORE THROAT: 0
SHORTNESS OF BREATH: 0
VOMITING: 0
BLOOD IN STOOL: 0
ABDOMINAL PAIN: 0
RHINORRHEA: 0

## 2019-05-17 ASSESSMENT — PAIN SCALES - GENERAL: PAINLEVEL_OUTOF10: 0

## 2019-05-17 NOTE — ED PROVIDER NOTES
[address] [facility]        Pt Name: Jeramy Arevalo  MRN: [id number]  [place] 1940  Date of evaluation: 5/17/2019  Provider: TIFFANIE Camp - BRIA  PCP: Reyes Ying MD      CHIEF COMPLAINT       Chief Complaint   Patient presents with    Chest Pain     started around 2pm, attempted to use nitro spray and did not help, called 911, was given 324 ASA and 2 Nitro po, pain now a 0/10 that started at 7/10       HISTORY OF PRESENT ILLNESS   (Location/Symptom, Timing/Onset,Context/Setting, Quality, Duration, Modifying Factors, Severity)  Note limiting factors. Jeramy Arevalo is a 66 y.o. female who presents to ER with concern for chest pain. Symptoms started around 2 PM.  Relieved after 3 nitro and aspirin. History of CAD but has not had an evaluation in years. She reports that is her cardiologist.  She denies fever, rash, headaches, dizziness, shortness of breath, cough, congestion, abdominal pain, nausea, vomiting, diarrhea, constipation, blood in the stool, or painful urination. One friend/family member at bedside. Nursing Notes triage note reviewed and agreed with or any disagreements were addressed  in the HPI. REVIEW OF SYSTEMS    (2-9 systems for level 4, 10 or more for level 5)     Review of Systems   Constitutional: Negative for chills and fever. HENT: Negative for postnasal drip, rhinorrhea and sore throat. Eyes: Negative for visual disturbance. Respiratory: Negative for shortness of breath. Cardiovascular: Positive for chest pain. Gastrointestinal: Negative for abdominal pain, blood in stool, constipation, diarrhea, nausea and vomiting. Genitourinary: Negative for dysuria, flank pain and hematuria. Skin: Negative for rash. Neurological: Negative for weakness and headaches. All other systems reviewed and are negative. Positives and Pertinent negatives as per HPI.   Except as noted ALLOPURINOL (ZYLOPRIM) 300 MG TABLET    Take 1 tablet by mouth daily    AMLODIPINE (NORVASC) 10 MG TABLET    Take 1 tablet by mouth daily    CLOPIDOGREL (PLAVIX) 75 MG TABLET    Take 1 tablet by mouth daily    GABAPENTIN (NEURONTIN) 100 MG CAPSULE    Take one capsule at night for 1 week and then 2 capsules at night    HYDROCODONE-ACETAMINOPHEN (NORCO) 5-325 MG PER TABLET    Take 1 tablet by mouth every 4 hours as needed for Pain for up to 30 days. LEVOTHYROXINE (SYNTHROID) 112 MCG TABLET    TAKE 1 TABLET DAILY    LOSARTAN (COZAAR) 25 MG TABLET    Take 1 tablet by mouth daily    MECLIZINE (ANTIVERT) 12.5 MG TABLET    Take 12.5 mg by mouth 3 times daily as needed for Dizziness or Nausea    METOPROLOL TARTRATE (LOPRESSOR) 100 MG TABLET    Take 1 tablet by mouth 2 times daily    NITROGLYCERIN (NITROLINGUAL) 0.4 MG/SPRAY 0.4 MG SPRAY    Place 1 spray under the tongue every 5 minutes as needed for Chest pain    RIVAROXABAN (XARELTO) 15 MG TABS TABLET        TIZANIDINE (ZANAFLEX) 4 MG TABLET    Take 1 tablet by mouth every 6 hours as needed (prn)    TOPIRAMATE (TOPAMAX) 100 MG TABLET    Take 1 tablet by mouth 2 times daily    TORSEMIDE (DEMADEX) 20 MG TABLET    1-2 qd    VITAMIN D (ERGOCALCIFEROL) 44913 UNITS CAPS CAPSULE    Take 1 capsule by mouth once a week    XARELTO 15 MG TABS TABLET    TAKE 1 TABLET DAILY WITH BREAKFAST    ZOLEDRONIC ACID (RECLAST) 5 MG/100ML SOLN    Infuse 5 mg intravenously once IV ,yearly         ALLERGIES     Aspirin; Ativan [lorazepam]; Diltiazem; Diltiazem hcl; Sulfa antibiotics; Dabigatran; Dabigatran etexilate mesylate; Dabigatran etexilate mesylate; Iodides; Mysoline [primidone];  Nsaids; and Other    FAMILY HISTORY       Family History   Problem Relation Age of Onset    Cancer Sister     Heart Disease Sister     Diabetes Brother     Hypertension Brother     Heart Disease Brother     Stroke Maternal Aunt     Heart Disease Maternal Aunt     Diabetes Maternal Uncle     (1.6 m) 143 lb (64.9 kg)       Physical Exam   Constitutional: She is oriented to person, place, and time. She appears well-developed and well-nourished. No distress. HENT:   Head: Normocephalic and atraumatic. Eyes: Right eye exhibits no discharge. Left eye exhibits no discharge. No scleral icterus. Neck: Normal range of motion. Neck supple. Cardiovascular: Normal rate, regular rhythm, normal heart sounds and intact distal pulses. Exam reveals no gallop and no friction rub. No murmur heard. Pulmonary/Chest: Effort normal and breath sounds normal. No stridor. No respiratory distress. She has no wheezes. She has no rales. She exhibits no tenderness. Abdominal: Soft. Bowel sounds are normal. She exhibits no distension and no mass. There is no tenderness. There is no rebound and no guarding. Musculoskeletal: Normal range of motion. She exhibits no edema or tenderness. Neurological: She is alert and oriented to person, place, and time. Coordination normal.   Skin: Skin is warm and dry. She is not diaphoretic. No pallor. Psychiatric: She has a normal mood and affect. Her behavior is normal.   Nursing note and vitals reviewed.       DIAGNOSTIC RESULTS   LABS:    Labs Reviewed   CBC WITH AUTO DIFFERENTIAL - Abnormal; Notable for the following components:       Result Value    RBC 5.43 (*)     MCH 25.8 (*)     RDW 18.7 (*)     All other components within normal limits    Narrative:     Performed at:  [facility] [address], [place], [address] [facility]   Phone [phone]   [address] MG FOR LOW K - Abnormal; Notable for the following components:    Glucose 119 (*)     BUN 43 (*)     CREATININE 1.6 (*)     GFR Non- 31 (*)     GFR  38 (*)     All other components within normal limits    Narrative:     Performed at:  [facility] [address], [place], [address] [facility]   Phone [phone] TROPONIN - Abnormal; Notable for the following components:    Troponin 0.22 (*)     All other components within normal limits    Narrative:     Performed at:  [facility] [address],  [facility], [address]   Phone 21  - Abnormal; Notable for the following components:    Pro- (*)     All other components within normal limits    Narrative:     Performed at:  [facility] [address],  [facility], [address]   Phone [phone]   APTT - Abnormal; Notable for the following components:    aPTT 44.1 (*)     All other components within normal limits    Narrative:     Performed at:  [facility] [address],  [facility], [address]   Phone [phone]   APTT   APTT       All other labs werewithin normal range or not returned as of this dictation. EKG: All EKG's are interpreted by the Emergency Department Physician who either signs or Co-signs this chart in the absence of acardiologist.  Please see their note for interpretation of EKG. RADIOLOGY:   Interpretation per the Radiologist below, if available at the time of this note:    XR CHEST PORTABLE   Final Result   Cardiomegaly with mild pulmonary vascular congestion. Possible small left   effusion. No results found.       PROCEDURES   Unless otherwise noted below, none     Procedures    CRITICAL CARE TIME     n/a    CONSULTS:  IP CONSULT TO CARDIOLOGY  IP CONSULT TO HOSPITALIST      EMERGENCY DEPARTMENT COURSE and DIFFERENTIAL DIAGNOSIS/MDM:   Vitals:    Vitals:    05/17/19 1930 05/17/19 1945 05/17/19 2000 05/17/19 2015   BP: (!) 155/42 (!) 143/55 (!) 151/60 (!) 144/58   Pulse: 70 70 77 74   Resp: 22 17 25 18   Temp:       TempSrc:       SpO2: 97% 95% 99% 96%   Weight:       Height:           Vern Skinner was given the following medications:  Medications   heparin (porcine) injection 5,190 Units (has no administration in time range)   heparin (porcine) injection 2,600 Units (has no administration in time range)   heparin 25,000 units in dextrose 5% 250 mL infusion (18 Units/kg/hr × 64.9 kg Intravenous New Bag 5/17/19 1912)   heparin (porcine) injection 5,190 Units (5,190 Units Intravenous Given 5/17/19 1911)       Heber Jarquin was evaluated in the emergency department with concern for chest pain. Positive troponin concerning for NSTEMI. She received ASA prior to arrival. Treated with heparin. I spoke with Dr. Juan Alberto Nino from cardiology. The hospitalist was consulted and is in agreement for admission. This plan was discussed with the patient, and my attending, Gael Alvarado MD, and both are in agreement with the plan. Please see attending note.       FINAL IMPRESSION      1. NSTEMI (non-ST elevated myocardial infarction) [facility])          DISPOSITION/PLAN   DISPOSITION Decision To Admit 05/17/2019 06:35:55 PM      (Please note that portions of this note were completed with a voice recognition program.  Efforts were made to edit the dictations but occasionally words are mis-transcribed.)    TIFFANIE Crespo CNP (electronically signed)        TIFFANIE Crespo CNP  05/17/19 2022

## 2019-05-17 NOTE — ED NOTES
Bed: 18  Expected date:   Expected time:   Means of arrival:   Comments:  Jaymie Louis RN  05/17/19 0688

## 2019-05-17 NOTE — ED PROVIDER NOTES
This patient was seen by the Mid-Level Provider. I have seen and examined the patient, agree with the workup, evaluation, management and diagnosis. Care plan has been discussed. My assessment reveals a 68-year-old female who presents with chest pain. She is currently chest pain-free after nitroglycerin however, she states the pain started around 2 PM this afternoon. She describes it as substernal chest pain. The patient has a known history of cardiac disease status post CABG and stents in the past.  The patient's workup today is consistent with an EKG that shows a normal paced rhythm at a rate of 77 beats a minute with no acute ST elevations or depressions or pathologic Q waves. She had an occasional PVC. Her workup included a troponin I came back elevated at 0.22 consistent with a non-STEMI. We have contacted cardiology and the patient will be admitted to our hospitalist with consultation. She is hemodynamically stable. Exam: Well-nourished female in no acute distress. Chest was clear to auscultation bilaterally. Heart was regular rate rhythm with no murmurs rubs or gallops. Disposition: Admission for further care.     Results for orders placed or performed during the hospital encounter of 05/17/19   CBC Auto Differential   Result Value Ref Range    WBC 9.7 4.0 - 11.0 K/uL    RBC 5.43 (H) 4.00 - 5.20 M/uL    Hemoglobin 14.0 12.0 - 16.0 g/dL    Hematocrit 43.4 36.0 - 48.0 %    MCV 80.0 80.0 - 100.0 fL    MCH 25.8 (L) 26.0 - 34.0 pg    MCHC 32.3 31.0 - 36.0 g/dL    RDW 18.7 (H) 12.4 - 15.4 %    Platelets 629 248 - 150 K/uL    MPV 8.5 5.0 - 10.5 fL    Neutrophils % 77.2 %    Lymphocytes % 11.9 %    Monocytes % 5.5 %    Eosinophils % 4.3 %    Basophils % 1.1 %    Neutrophils # 7.5 1.7 - 7.7 K/uL    Lymphocytes # 1.2 1.0 - 5.1 K/uL    Monocytes # 0.5 0.0 - 1.3 K/uL    Eosinophils # 0.4 0.0 - 0.6 K/uL    Basophils # 0.1 0.0 - 0.2 K/uL   Basic Metabolic Panel w/ Reflex to MG   Result Value Ref Range Sodium 141 136 - 145 mmol/L    Potassium reflex Magnesium 4.0 3.5 - 5.1 mmol/L    Chloride 106 99 - 110 mmol/L    CO2 26 21 - 32 mmol/L    Anion Gap 9 3 - 16    Glucose 119 (H) 70 - 99 mg/dL    BUN 43 (H) 7 - 20 mg/dL    CREATININE 1.6 (H) 0.6 - 1.2 mg/dL    GFR Non- 31 (A) >60    GFR  38 (A) >60    Calcium 9.1 8.3 - 10.6 mg/dL   Troponin   Result Value Ref Range    Troponin 0.22 (H) <0.01 ng/mL   Brain Natriuretic Peptide   Result Value Ref Range    Pro- (H) 0 - 449 pg/mL   EKG 12 Lead   Result Value Ref Range    Ventricular Rate 77 BPM    Atrial Rate 77 BPM    P-R Interval 230 ms    QRS Duration 100 ms    Q-T Interval 412 ms    QTc Calculation (Bazett) 466 ms    R Axis -10 degrees    T Axis 78 degrees    Diagnosis       Electronic atrial pacemakerSeptal infarct , age undeterminedAbnormal ECG     Xr Chest Portable    Result Date: 5/17/2019  EXAMINATION: ONE XRAY VIEW OF THE CHEST 5/17/2019 5:54 pm COMPARISON: 12/19/2018 HISTORY: ORDERING SYSTEM PROVIDED HISTORY: cp TECHNOLOGIST PROVIDED HISTORY: Reason for exam:->cp Ordering Physician Provided Reason for Exam: Chest Pain started around 2pm. Acuity: Acute Type of Exam: Initial FINDINGS: Previous sternotomy. Left-sided pacer with leads projected right atrium and right ventricle. Stable cardiomegaly. Aortic calcifications. Left coronary stent. Mild pulmonary vascular congestion. Possible small left effusion. No focal consolidation. No pneumothorax. Cardiomegaly with mild pulmonary vascular congestion. Possible small left effusion. Xr Hip 2-3 Vw W Pelvis Left    Result Date: 4/26/2019  Radiology exam is complete. No Radiologist dictation. Please follow up with ordering provider.             Marv Jauregui MD  05/17/19 5027

## 2019-05-18 LAB
ANION GAP SERPL CALCULATED.3IONS-SCNC: 14 MMOL/L (ref 3–16)
APTT: 209.7 SEC (ref 26–36)
APTT: 54.4 SEC (ref 26–36)
APTT: 63.4 SEC (ref 26–36)
APTT: 84 SEC (ref 26–36)
BUN BLDV-MCNC: 40 MG/DL (ref 7–20)
CALCIUM SERPL-MCNC: 8.8 MG/DL (ref 8.3–10.6)
CHLORIDE BLD-SCNC: 108 MMOL/L (ref 99–110)
CHOLESTEROL, TOTAL: 157 MG/DL (ref 0–199)
CO2: 19 MMOL/L (ref 21–32)
CREAT SERPL-MCNC: 1.4 MG/DL (ref 0.6–1.2)
EKG ATRIAL RATE: 70 BPM
EKG ATRIAL RATE: 77 BPM
EKG DIAGNOSIS: NORMAL
EKG DIAGNOSIS: NORMAL
EKG P AXIS: 33 DEGREES
EKG P-R INTERVAL: 230 MS
EKG P-R INTERVAL: 282 MS
EKG Q-T INTERVAL: 412 MS
EKG Q-T INTERVAL: 448 MS
EKG QRS DURATION: 100 MS
EKG QRS DURATION: 120 MS
EKG QTC CALCULATION (BAZETT): 466 MS
EKG QTC CALCULATION (BAZETT): 483 MS
EKG R AXIS: -10 DEGREES
EKG R AXIS: -44 DEGREES
EKG T AXIS: 78 DEGREES
EKG T AXIS: 84 DEGREES
EKG VENTRICULAR RATE: 70 BPM
EKG VENTRICULAR RATE: 77 BPM
GFR AFRICAN AMERICAN: 44
GFR NON-AFRICAN AMERICAN: 36
GLUCOSE BLD-MCNC: 96 MG/DL (ref 70–99)
HCT VFR BLD CALC: 42.9 % (ref 36–48)
HDLC SERPL-MCNC: 28 MG/DL (ref 40–60)
HEMOGLOBIN: 13.6 G/DL (ref 12–16)
LDL CHOLESTEROL CALCULATED: 75 MG/DL
MCH RBC QN AUTO: 26 PG (ref 26–34)
MCHC RBC AUTO-ENTMCNC: 31.6 G/DL (ref 31–36)
MCV RBC AUTO: 82.1 FL (ref 80–100)
PDW BLD-RTO: 19.2 % (ref 12.4–15.4)
PLATELET # BLD: 287 K/UL (ref 135–450)
PMV BLD AUTO: 8.7 FL (ref 5–10.5)
POTASSIUM REFLEX MAGNESIUM: 4.3 MMOL/L (ref 3.5–5.1)
RBC # BLD: 5.23 M/UL (ref 4–5.2)
SODIUM BLD-SCNC: 141 MMOL/L (ref 136–145)
TRIGL SERPL-MCNC: 272 MG/DL (ref 0–150)
TROPONIN: 0.5 NG/ML
VLDLC SERPL CALC-MCNC: 54 MG/DL
WBC # BLD: 10.3 K/UL (ref 4–11)

## 2019-05-18 PROCEDURE — 80048 BASIC METABOLIC PNL TOTAL CA: CPT

## 2019-05-18 PROCEDURE — 93005 ELECTROCARDIOGRAM TRACING: CPT | Performed by: NURSE PRACTITIONER

## 2019-05-18 PROCEDURE — 36415 COLL VENOUS BLD VENIPUNCTURE: CPT

## 2019-05-18 PROCEDURE — 85730 THROMBOPLASTIN TIME PARTIAL: CPT

## 2019-05-18 PROCEDURE — 6370000000 HC RX 637 (ALT 250 FOR IP): Performed by: NURSE PRACTITIONER

## 2019-05-18 PROCEDURE — 80061 LIPID PANEL: CPT

## 2019-05-18 PROCEDURE — 85027 COMPLETE CBC AUTOMATED: CPT

## 2019-05-18 PROCEDURE — 93010 ELECTROCARDIOGRAM REPORT: CPT | Performed by: INTERNAL MEDICINE

## 2019-05-18 PROCEDURE — 84484 ASSAY OF TROPONIN QUANT: CPT

## 2019-05-18 PROCEDURE — 2060000000 HC ICU INTERMEDIATE R&B

## 2019-05-18 PROCEDURE — 2580000003 HC RX 258: Performed by: NURSE PRACTITIONER

## 2019-05-18 PROCEDURE — 6370000000 HC RX 637 (ALT 250 FOR IP): Performed by: INTERNAL MEDICINE

## 2019-05-18 RX ORDER — ACETAMINOPHEN 80 MG
TABLET,CHEWABLE ORAL
Status: COMPLETED
Start: 2019-05-18 | End: 2019-05-18

## 2019-05-18 RX ADMIN — LISINOPRIL 5 MG: 10 TABLET ORAL at 09:57

## 2019-05-18 RX ADMIN — Medication 10 ML: at 09:58

## 2019-05-18 RX ADMIN — Medication: at 12:24

## 2019-05-18 RX ADMIN — TOPIRAMATE 100 MG: 100 TABLET, FILM COATED ORAL at 09:57

## 2019-05-18 RX ADMIN — TOPIRAMATE 100 MG: 100 TABLET, FILM COATED ORAL at 21:03

## 2019-05-18 RX ADMIN — LOSARTAN POTASSIUM 25 MG: 25 TABLET ORAL at 09:57

## 2019-05-18 RX ADMIN — METOPROLOL TARTRATE 50 MG: 50 TABLET, FILM COATED ORAL at 21:04

## 2019-05-18 RX ADMIN — GABAPENTIN 100 MG: 100 CAPSULE ORAL at 21:04

## 2019-05-18 RX ADMIN — LEVOTHYROXINE SODIUM 100 MCG: 100 TABLET ORAL at 05:21

## 2019-05-18 RX ADMIN — Medication: at 00:37

## 2019-05-18 RX ADMIN — AMLODIPINE BESYLATE 10 MG: 5 TABLET ORAL at 09:57

## 2019-05-18 RX ADMIN — ATORVASTATIN CALCIUM 40 MG: 80 TABLET, FILM COATED ORAL at 21:03

## 2019-05-18 RX ADMIN — METOPROLOL TARTRATE 50 MG: 50 TABLET, FILM COATED ORAL at 09:57

## 2019-05-18 RX ADMIN — ACETAMINOPHEN 650 MG: 325 TABLET ORAL at 19:52

## 2019-05-18 RX ADMIN — CLOPIDOGREL 75 MG: 75 TABLET, FILM COATED ORAL at 09:57

## 2019-05-18 ASSESSMENT — PAIN DESCRIPTION - PAIN TYPE: TYPE: NEUROPATHIC PAIN

## 2019-05-18 ASSESSMENT — PAIN DESCRIPTION - LOCATION: LOCATION: LEG

## 2019-05-18 ASSESSMENT — PAIN SCALES - GENERAL: PAINLEVEL_OUTOF10: 3

## 2019-05-18 NOTE — FLOWSHEET NOTE
05/18/19 1107   Encounter Summary   Services provided to: Patient   Referral/Consult From: Nurse   Support System Family members   Continue Visiting   (visit, AD doc discussion 5/18 CL)   Complexity of Encounter Moderate   Length of Encounter 15 minutes   Spiritual/Quaker   Type Spiritual support   Assessment Calm; Approachable;Coping   Intervention Active listening;Explored feelings, thoughts, concerns   Outcome Engaged in conversation;Coping   Advance Directives (For Healthcare)   Healthcare Directive No, patient does not have an advance directive for healthcare treatment   Advance Directives Documents given;Documents explained   Electronically signed by Quynh Pinzon on 5/18/2019 at 11:09 AM

## 2019-05-18 NOTE — PROGRESS NOTES
4 Eyes Skin Assessment     The patient is being assess for  Admission    I agree that 2 RN's have performed a thorough Head to Toe Skin Assessment on the patient. ALL assessment sites listed below have been assessed. Areas assessed by both nurses: Yes  [x]   Head, Face, and Ears   [x]   Shoulders, Back, and Chest  [x]   Arms, Elbows, and Hands   [x]   Coccyx, Sacrum, and IschIum  [x]   Legs, Feet, and Heels        Does the Patient have Skin Breakdown?   No         Conor Prevention initiated:  No   Wound Care Orders initiated:  No      [facility] nurse consulted for Pressure Injury (Stage 3,4, Unstageable, DTI, NWPT, and Complex wounds), New and Established Ostomies:  No      Nurse 1 eSignature: Electronically signed by Ez Quevedo RN on 5/18/19 at 12:40 AM    **SHARE this note so that the co-signing nurse is able to place an eSignature**    Nurse 2 eSignature: Electronically signed by Zev Ashraf RN on 5/18/19 at 12:41 AM

## 2019-05-18 NOTE — PLAN OF CARE
Problem: Pain:  Goal: Pain level will decrease  Description  Pain level will decrease  Outcome: Ongoing  Goal: Control of acute pain  Description  Control of acute pain  Outcome: Ongoing  Goal: Control of chronic pain  Description  Control of chronic pain  Outcome: Ongoing     Problem: Falls - Risk of:  Goal: Will remain free from falls  Description  Will remain free from falls  Outcome: Ongoing  Goal: Absence of physical injury  Description  Absence of physical injury  Outcome: Ongoing     Problem: Discharge Planning:  Goal: Discharged to appropriate level of care  Description  Discharged to appropriate level of care  Outcome: Ongoing     Problem: Fluid Volume - Imbalance:  Goal: Will show no signs and symptoms of excessive bleeding  Description  Will show no signs and symptoms of excessive bleeding  Outcome: Ongoing  Goal: Absence of imbalanced fluid volume signs and symptoms  Description  Absence of imbalanced fluid volume signs and symptoms  Outcome: Ongoing     Problem: Anxiety:  Goal: Level of anxiety will decrease  Description  Level of anxiety will decrease  Outcome: Ongoing     Problem: Cardiac Output - Decreased:  Goal: Cardiac output within specified parameters  Description  Cardiac output within specified parameters  Outcome: Ongoing     Problem: Tissue Perfusion - Cardiopulmonary, Altered:  Goal: Circulatory function within specified parameters  Description  Circulatory function within specified parameters  Outcome: Ongoing  Goal: Absence of angina  Description  Absence of angina  Outcome: Ongoing  Goal: Hemodynamic stability will improve  Description  Hemodynamic stability will improve  Outcome: Ongoing

## 2019-05-18 NOTE — H&P
Hospital Medicine History & Physical      PCP: Rosanna Unger MD    Date of Admission: 5/17/2019    Date of Service: Pt seen/examined on May 17, 2019 and Admitted to Inpatient with expected LOS greater than two midnights due to medical therapy. Chief Complaint:  Chest pain      History Of Present Illness:      66 y.o. female with PMHx of atrial fibrillation, CAD, CABG, TIA, chronic kidney disease, congestive heart failure, hypertension  presented to [facility] 88 hour history of chest pain. Patient reports that her chest pain started around 2:00 this afternoon. She tried taking her own nitroglycerin which did not help. She does describe the pain as a 6 out of 10 at its worse. It radiated across the entire chest into both arms. She denies any nausea vomiting fever diarrhea denies any other ill contacts. She then called 911. In route she received nitroglycerin which did relieve her pain. And she is currently pain-free.     Past Medical History:          Diagnosis Date    Allergic rhinitis, cause unspecified     Arthritis     Atrial fibrillation ([address]     Bronchopneumonia     CAD (coronary artery disease)     stent:  post cataract surgery (CABG)    Cerebral artery occlusion with cerebral infarction (HCC)     TIA    Chronic gouty arthropathy     Chronic kidney disease     Congestive heart failure, unspecified     Degeneration of cervical intervertebral disc     Essential and other specified forms of tremor     Gout     HIGH CHOLESTEROL     History of CVA (cerebrovascular accident)     Hypertension     Influenza 12/23/2017    Mitral valve stenosis and aortic valve insufficiency     Movement disorder     back problems    Pacemaker     Peptic ulcer, unspecified site, unspecified as acute or chronic, without mention of hemorrhage, perforation, or obstruction     Thyroid disease     Unspecified disorder of kidney and ureter     Unspecified hypothyroidism        Past Surgical History: Procedure Laterality Date    BACK SURGERY      CARDIAC CATHETERIZATION  7/2012    CARDIAC CATHETERIZATION  08/07/2018    Unsuccesful  of RCA    CARDIAC SURGERY      CABG & Cardiac ablation    CHOLECYSTECTOMY      CORONARY ARTERY BYPASS GRAFT  1987    LIMA- Diag/LAD, SVG- RCA    HIP SURGERY Left 03/16/2017    Left hip pinning    JOINT REPLACEMENT      LA INJECT ANES/STEROID FORAMEN LUMBAR/SACRAL W IMG GUIDE ,1 LEVEL Right 12/3/2018    RIGHT L3 AND L4 LUMBAR TRANSFORAMINAL EPIRUAL STEROID INJECTION WITH FLUOROSCOPY performed by Rich Velasquez MD at [facility]  11/04/2014    MARLENY - 3.0 x 28 to the Ist Diag   Nhan      left       Medications Prior to Admission:      Prior to Admission medications    Medication Sig Start Date End Date Taking? Authorizing Provider   allopurinol (ZYLOPRIM) 300 MG tablet Take 1 tablet by mouth daily 4/26/19  Yes Wesley Del Cid MD   levothyroxine (SYNTHROID) 112 MCG tablet TAKE 1 TABLET DAILY 4/18/19  Yes Wesley Del Cid MD   torsemide (DEMADEX) 20 MG tablet 1-2 qd 4/18/19  Yes Wesley Del Cid MD   HYDROcodone-acetaminophen (NORCO) 5-325 MG per tablet Take 1 tablet by mouth every 4 hours as needed for Pain for up to 30 days.  4/18/19 5/18/19 Yes Wesley Del Cid MD   gabapentin (NEURONTIN) 100 MG capsule Take one capsule at night for 1 week and then 2 capsules at night 4/15/19 6/15/19 Yes Shell Kong MD   amLODIPine (NORVASC) 10 MG tablet Take 1 tablet by mouth daily 2/8/19  Yes Wesley Del Cid MD   topiramate (TOPAMAX) 100 MG tablet Take 1 tablet by mouth 2 times daily 1/18/19  Yes Wesley Del Cid MD   rivaroxaban (XARELTO) 15 MG TABS tablet  12/4/18  Yes Rich Velasquez MD   clopidogrel (PLAVIX) 75 MG tablet Take 1 tablet by mouth daily 12/4/18  Yes Rich Velasquez MD   allopurinol (ZYLOPRIM) 100 MG tablet Take 1 tablet by mouth daily Take along with 300mg tablet for total of 400mg. 7/16/18  Yes Wesley Del Cid MD   vitamin D (ERGOCALCIFEROL) 90687 units CAPS capsule Take 1 capsule by mouth once a week 4/24/18  Yes Rashid Malhotra MD   nitroGLYCERIN (NITROLINGUAL) 0.4 MG/SPRAY 0.4 mg spray Place 1 spray under the tongue every 5 minutes as needed for Chest pain 3/29/18  Yes TIFFANIE Cota CNP   metoprolol tartrate (LOPRESSOR) 100 MG tablet Take 1 tablet by mouth 2 times daily 11/14/17  Yes Maddy Cooper MD   XARELTO 15 MG TABS tablet TAKE 1 TABLET DAILY WITH BREAKFAST 5/7/19   TIFFANIE Cota CNP   losartan (COZAAR) 25 MG tablet Take 1 tablet by mouth daily 4/18/19   Leighann Blandon MD   meclizine (ANTIVERT) 12.5 MG tablet Take 12.5 mg by mouth 3 times daily as needed for Dizziness or Nausea    Historical Provider, MD   tiZANidine (ZANAFLEX) 4 MG tablet Take 1 tablet by mouth every 6 hours as needed (prn) 6/21/18   Leighann Blandon MD   zoledronic acid (RECLAST) 5 MG/100ML SOLN Infuse 5 mg intravenously once IV ,yearly    Historical Provider, MD       Allergies:  Aspirin; Ativan [lorazepam]; Diltiazem; Diltiazem hcl; Sulfa antibiotics; Dabigatran; Dabigatran etexilate mesylate; Dabigatran etexilate mesylate; Iodides; Mysoline [primidone]; Nsaids; and Other    Social History:      The patient currently lives at home with her     TOBACCO:   reports that she quit smoking about 32 years ago. Her smoking use included cigarettes. She has a 28.00 pack-year smoking history. She has never used smokeless tobacco.  ETOH:   reports that she does not drink alcohol.       Family History:      Reviewed in detail positive as follows:        Problem Relation Age of Onset    Cancer Sister     Heart Disease Sister     Diabetes Brother     Hypertension Brother     Heart Disease Brother     Stroke Maternal Aunt     Heart Disease Maternal Aunt     Diabetes Maternal Uncle     Hypertension Paternal Aunt     Cancer Maternal Grandmother     Cancer Paternal Grandmother     Rheum Arthritis Neg Hx     Lupus Neg Hx        REVIEW OF SYSTEMS:   Pertinent positives as noted in the HPI. All other systems reviewed and negative. PHYSICAL EXAM PERFORMED:    BP (!) 144/58   Pulse 74   Temp 97.8 °F (36.6 °C) (Oral)   Resp 18   Ht 5' 3\" (1.6 m)   Wt 143 lb (64.9 kg)   SpO2 96%   BMI 25.33 kg/m²     General appearance:  No apparent distress, appears stated age and cooperative. HEENT:  Normal cephalic, atraumatic without obvious deformity. Pupils equal, round, and reactive to light. Extra ocular muscles intact. Conjunctivae/corneas clear. Neck: Supple, with full range of motion. No jugular venous distention. Trachea midline. Respiratory:  Normal respiratory effort. Clear to auscultation, bilaterally without Rales/Wheezes/Rhonchi. Cardiovascular:  Regular rate and rhythm without murmurs, rubs or gallops. Abdomen: Soft, non-tender, non-distended, without rebound or guarding. Normal bowel sounds. Musculoskeletal:  No clubbing, cyanosis or edema bilaterally. Full range of motion without deformity. Skin: Skin color, texture, turgor normal.  No rashes or lesions. Neurologic:  Neurovascularly intact without any focal sensory/motor deficits. Cranial nerves: II-XII intact, grossly non-focal.  Psychiatric:  Alert and oriented, thought content appropriate, normal insight  Capillary Refill: Brisk,< 3 seconds   Peripheral Pulses: +2 palpable, equal bilaterally       Labs:     Recent Labs     05/17/19  1806   WBC 9.7   HGB 14.0   HCT 43.4        Recent Labs     05/17/19  1806      K 4.0      CO2 26   BUN 43*   CREATININE 1.6*   CALCIUM 9.1     No results for input(s): AST, ALT, BILIDIR, BILITOT, ALKPHOS in the last 72 hours. No results for input(s): INR in the last 72 hours.   Recent Labs     05/17/19  1806   TROPONINI 0.22*       Urinalysis:      Lab Results   Component Value Date    NITRU Negative 10/19/2018    WBCUA 11 03/21/2018    BACTERIA 1+ 10/19/2018    BACTERIA 2+ 03/10/2017    RBCUA Negative 10/19/2018    RBCUA 1 03/21/2018    BLOODU Positive 10/19/2018    SPECGRAV 1.025 10/19/2018    GLUCOSEU Negative 10/19/2018    GLUCOSEU NEGATIVE 12/04/2011       Radiology:     CXR: I have reviewed the CXR with the following interpretation:   EKG:  I have reviewed the EKG with the following interpretation:    XR CHEST PORTABLE   Final Result   Cardiomegaly with mild pulmonary vascular congestion. Possible small left   effusion. ASSESSMENT:    Active Hospital Problems    Diagnosis Date Noted    Hypertension [I10]      Priority: Low    NSTEMI (non-ST elevated myocardial infarction) ([facility] [address] [I21.4] 05/17/2019    CAD S/P percutaneous coronary angioplasty [I25.10, Z98.61]          PLAN:    NSTEMI:   - Pt with Chest Pain and Elevated Troponins. Monitor on Telemetry, and check Serial Cardiac Enzymes. A Heparin drip has been started. Cardiology has been consulted, and patient will be kept NPO for possible Cardiac Cath. Check a Lipid Profile, and give beta Blocker, Statin, ACEI and Aspirin. Echocardiogram when appropriate. hypertension  -Stable continue home medications    CAD with stents  Had Abnormal stress test in June 2018, did well initially on med RX.  taking Ranexa, amlodipine. DVT Prophylaxis: on a heparin drip  Diet: cardiac  Code Status: full code    PT/OT Eval Status: to be determined    Dispo - iinpatient admission       [facility] [place], APRN - BRIA    Thank you Aurea Jameson MD for the opportunity to be involved in this patient's care. If you have any questions or concerns please feel free to contact me at [phone].

## 2019-05-18 NOTE — PROGRESS NOTES
Hospitalist Progress Note      PCP: Chelsi Jackson MD    Date of Admission: 5/17/2019    Chief Complaint: cp    Hospital Course: 65 yo presents to hospital with cp    Subjective: 65 yo presents with cp. Ruled out for mi. Chest pain    Medications:  Reviewed    Infusion Medications    heparin (porcine) 12 Units/kg/hr (05/18/19 0314)     Scheduled Medications    sodium chloride flush  10 mL Intravenous 2 times per day    atorvastatin  40 mg Oral Nightly    metoprolol tartrate  50 mg Oral BID    lisinopril  5 mg Oral Daily    amLODIPine  10 mg Oral Daily    clopidogrel  75 mg Oral Daily    gabapentin  100 mg Oral Nightly    losartan  25 mg Oral Daily    topiramate  100 mg Oral BID    levothyroxine  100 mcg Oral QAM AC     PRN Meds: heparin (porcine), heparin (porcine), sodium chloride flush, magnesium hydroxide, ondansetron, bisacodyl, acetaminophen, nitroGLYCERIN, tiZANidine    No intake or output data in the 24 hours ending 05/18/19 1401    Physical Exam Performed:    /65   Pulse 70   Temp 97.6 °F (36.4 °C) (Oral)   Resp 18   Ht 5' 3\" (1.6 m)   Wt 146 lb 9.6 oz (66.5 kg)   SpO2 99%   BMI 25.97 kg/m²     General appearance: No apparent distress, appears stated age and cooperative. HEENT: Pupils equal, round, and reactive to light. Conjunctivae/corneas clear. Neck: Supple, with full range of motion. No jugular venous distention. Trachea midline. Respiratory:  Normal respiratory effort. Clear to auscultation, bilaterally without Rales/Wheezes/Rhonchi. Cardiovascular: Regular rate and rhythm with normal S1/S2 without murmurs, rubs or gallops. Abdomen: Soft, non-tender, non-distended with normal bowel sounds. Musculoskeletal: No clubbing, cyanosis or edema bilaterally. Full range of motion without deformity. Skin: Skin color, texture, turgor normal.  No rashes or lesions. Neurologic:  Neurovascularly intact without any focal sensory/motor deficits.  Cranial nerves: II-XII intact, grossly non-focal.  Psychiatric: Alert and oriented, thought content appropriate, normal insight  Capillary Refill: Brisk,< 3 seconds   Peripheral Pulses: +2 palpable, equal bilaterally       Labs:   Recent Labs     05/17/19  1806 05/18/19  0541   WBC 9.7 10.3   HGB 14.0 13.6   HCT 43.4 42.9    287     Recent Labs     05/17/19  1806 05/18/19  0541    141   K 4.0 4.3    108   CO2 26 19*   BUN 43* 40*   CREATININE 1.6* 1.4*   CALCIUM 9.1 8.8     No results for input(s): AST, ALT, BILIDIR, BILITOT, ALKPHOS in the last 72 hours. No results for input(s): INR in the last 72 hours. Recent Labs     05/17/19 1806 05/18/19  0007   TROPONINI 0.22* 0.50*       Urinalysis:      Lab Results   Component Value Date    NITRU Negative 10/19/2018    WBCUA 11 03/21/2018    BACTERIA 1+ 10/19/2018    BACTERIA 2+ 03/10/2017    RBCUA Negative 10/19/2018    RBCUA 1 03/21/2018    BLOODU Positive 10/19/2018    SPECGRAV 1.025 10/19/2018    GLUCOSEU Negative 10/19/2018    GLUCOSEU NEGATIVE 12/04/2011       Radiology:  XR CHEST PORTABLE   Final Result   Cardiomegaly with mild pulmonary vascular congestion. Possible small left   effusion. Assessment/Plan:    1  nstemi-cp free. Continue asa, plavix, heparin b-blocker and        Ace and statin      Cardiology to see    2  HTN-stable.  Continue present        DVT Prophylaxis: lovenox  Diet: DIET CARDIAC; No Caffeine  Code Status: Full Code        Dispo - 2-3 days    Elliott Oliver MD

## 2019-05-18 NOTE — PROGRESS NOTES
Shift assessment/admisson complete, meds given whole with water. Pt admitted for chest pain that is now resolved. Heparin drip started, aptt q6 draws. Pt is alert x4, alert to room and call light. Pt understands she is on bed rest and needs to call for the bed pan. Vitals stable. No other concerns, will continue to monitor.

## 2019-05-19 LAB — APTT: 65.5 SEC (ref 26–36)

## 2019-05-19 PROCEDURE — 6370000000 HC RX 637 (ALT 250 FOR IP): Performed by: NURSE PRACTITIONER

## 2019-05-19 PROCEDURE — 6370000000 HC RX 637 (ALT 250 FOR IP): Performed by: INTERNAL MEDICINE

## 2019-05-19 PROCEDURE — 2580000003 HC RX 258: Performed by: NURSE PRACTITIONER

## 2019-05-19 PROCEDURE — 36415 COLL VENOUS BLD VENIPUNCTURE: CPT

## 2019-05-19 PROCEDURE — 2060000000 HC ICU INTERMEDIATE R&B

## 2019-05-19 PROCEDURE — 85730 THROMBOPLASTIN TIME PARTIAL: CPT

## 2019-05-19 PROCEDURE — 99223 1ST HOSP IP/OBS HIGH 75: CPT | Performed by: INTERNAL MEDICINE

## 2019-05-19 PROCEDURE — 6360000002 HC RX W HCPCS: Performed by: NURSE PRACTITIONER

## 2019-05-19 RX ORDER — GABAPENTIN 100 MG/1
100 CAPSULE ORAL 2 TIMES DAILY
Status: DISCONTINUED | OUTPATIENT
Start: 2019-05-19 | End: 2019-05-20 | Stop reason: HOSPADM

## 2019-05-19 RX ADMIN — LEVOTHYROXINE SODIUM 100 MCG: 100 TABLET ORAL at 08:02

## 2019-05-19 RX ADMIN — CLOPIDOGREL 75 MG: 75 TABLET, FILM COATED ORAL at 08:37

## 2019-05-19 RX ADMIN — AMLODIPINE BESYLATE 10 MG: 5 TABLET ORAL at 08:38

## 2019-05-19 RX ADMIN — Medication: at 19:01

## 2019-05-19 RX ADMIN — Medication 10 ML: at 08:38

## 2019-05-19 RX ADMIN — LISINOPRIL 5 MG: 10 TABLET ORAL at 08:37

## 2019-05-19 RX ADMIN — ATORVASTATIN CALCIUM 40 MG: 80 TABLET, FILM COATED ORAL at 21:06

## 2019-05-19 RX ADMIN — TOPIRAMATE 100 MG: 100 TABLET, FILM COATED ORAL at 08:36

## 2019-05-19 RX ADMIN — GABAPENTIN 100 MG: 100 CAPSULE ORAL at 12:33

## 2019-05-19 RX ADMIN — METOPROLOL TARTRATE 50 MG: 50 TABLET, FILM COATED ORAL at 21:06

## 2019-05-19 RX ADMIN — TOPIRAMATE 100 MG: 100 TABLET, FILM COATED ORAL at 21:06

## 2019-05-19 RX ADMIN — METOPROLOL TARTRATE 50 MG: 50 TABLET, FILM COATED ORAL at 08:37

## 2019-05-19 RX ADMIN — HEPARIN SODIUM AND DEXTROSE 12.02 UNITS/KG/HR: 10000; 5 INJECTION INTRAVENOUS at 01:51

## 2019-05-19 RX ADMIN — GABAPENTIN 100 MG: 100 CAPSULE ORAL at 21:07

## 2019-05-19 ASSESSMENT — PAIN SCALES - GENERAL
PAINLEVEL_OUTOF10: 4
PAINLEVEL_OUTOF10: 5

## 2019-05-19 ASSESSMENT — PAIN DESCRIPTION - LOCATION
LOCATION: LEG
LOCATION: LEG

## 2019-05-19 ASSESSMENT — PAIN DESCRIPTION - PAIN TYPE
TYPE: NEUROPATHIC PAIN
TYPE: NEUROPATHIC PAIN

## 2019-05-19 ASSESSMENT — PAIN DESCRIPTION - ORIENTATION
ORIENTATION: RIGHT;LEFT
ORIENTATION: LEFT;RIGHT

## 2019-05-19 NOTE — PLAN OF CARE
Problem: Pain:  Goal: Pain level will decrease  Description  Pain level will decrease  Outcome: Ongoing  Goal: Control of acute pain  Description  Control of acute pain  Note:   Pt given tylenol to alleviate pain in tianna lower extremities  Goal: Control of chronic pain  Description  Control of chronic pain  Outcome: Ongoing     Problem: Falls - Risk of:  Goal: Will remain free from falls  Description  Will remain free from falls  Outcome: Ongoing  Goal: Absence of physical injury  Description  Absence of physical injury  Outcome: Ongoing     Problem: Discharge Planning:  Goal: Discharged to appropriate level of care  Description  Discharged to appropriate level of care  Outcome: Not Met This Shift     Problem: Fluid Volume - Imbalance:  Goal: Will show no signs and symptoms of excessive bleeding  Description  Will show no signs and symptoms of excessive bleeding  Outcome: Ongoing  Goal: Absence of imbalanced fluid volume signs and symptoms  Description  Absence of imbalanced fluid volume signs and symptoms  Outcome: Ongoing     Problem: Anxiety:  Goal: Level of anxiety will decrease  Description  Level of anxiety will decrease  Note:   Pt has not shown signs of anxiety or complained of any symptoms of anxiety this shift

## 2019-05-19 NOTE — CONSULTS
[address]  Inpatient Consultation / H+P      REASON FOR CONSULT/CHIEF COMPLAINT/HPI     TYPE Interventional Cardiology / Structural Heart Disease   RFC/CC Cp, sob   HPI Ashley Shaw is a 66 y. o.presents with cp. CP substernal, 5/10, radiated to arms, +sob, relieved somewhat by nitro. EKG with dual chamber pacing. Trop 0.22->0.5. Currently cp free. Patient with intermittent cp for years but yesterday much more profound. HISTORY/ALLERGIES/ROS       MedHx:  has a past medical history of Allergic rhinitis, cause unspecified, Arthritis, Atrial fibrillation (HCC), Bronchopneumonia, CAD (coronary artery disease), Cerebral artery occlusion with cerebral infarction ([facility] [address] Chronic gouty arthropathy, Chronic kidney disease, Congestive heart failure, unspecified, Degeneration of cervical intervertebral disc, Essential and other specified forms of tremor, Gout, HIGH CHOLESTEROL, History of CVA (cerebrovascular accident), Hypertension, Influenza, Mitral valve stenosis and aortic valve insufficiency, Movement disorder, Pacemaker, Peptic ulcer, unspecified site, unspecified as acute or chronic, without mention of hemorrhage, perforation, or obstruction, Thyroid disease, Unspecified disorder of kidney and ureter, and Unspecified hypothyroidism. SurgHx:  has a past surgical history that includes back surgery; Cholecystectomy; Cardiac surgery; Coronary artery bypass graft (1987); shoulder surgery; Cardiac catheterization (7/2012); hip surgery (Left, 03/16/2017); joint replacement; Cardiac catheterization (08/07/2018); Percutaneous Transluminal Coronary Angio (11/04/2014); and pr inject anes/steroid foramen lumbar/sacral w img guide ,1 level (Right, 12/3/2018). SocHx:  reports that she quit smoking about 32 years ago. Her smoking use included cigarettes. She has a 28.00 pack-year smoking history. She has never used smokeless tobacco. She reports that she does not drink alcohol or use drugs.    FamHx: No evidence for sudden cardiac death or premature CAD. Allergies: Aspirin; Ativan [lorazepam]; Diltiazem; Sulfa antibiotics; Dabigatran; Mysoline [primidone]; Nsaids; and Other   ROS:   [x]Full ROS obtained and negative except as mentioned in HPI    MEDICATIONS      Prior to Admission medications    Medication Sig Start Date End Date Taking?  Authorizing Provider   XARELTO 15 MG TABS tablet TAKE 1 TABLET DAILY WITH BREAKFAST 5/7/19  Yes TIFFANIE Garcia CNP   allopurinol (ZYLOPRIM) 300 MG tablet Take 1 tablet by mouth daily 4/26/19  Yes Tashi Lundy MD   levothyroxine (SYNTHROID) 112 MCG tablet TAKE 1 TABLET DAILY 4/18/19  Yes Tashi Lundy MD   torsemide (DEMADEX) 20 MG tablet 1-2 qd 4/18/19  Yes Tashi Lundy MD   gabapentin (NEURONTIN) 100 MG capsule Take one capsule at night for 1 week and then 2 capsules at night 4/15/19 6/15/19 Yes Lolis Tinsley MD   amLODIPine (NORVASC) 10 MG tablet Take 1 tablet by mouth daily 2/8/19  Yes Tashi Lundy MD   topiramate (TOPAMAX) 100 MG tablet Take 1 tablet by mouth 2 times daily 1/18/19  Yes Tashi Lundy MD   clopidogrel (PLAVIX) 75 MG tablet Take 1 tablet by mouth daily 12/4/18  Yes Uriel Starkey MD   allopurinol (ZYLOPRIM) 100 MG tablet Take 1 tablet by mouth daily Take along with 300mg tablet for total of 400mg. 7/16/18  Yes Tashi Lundy MD   vitamin D (ERGOCALCIFEROL) 56416 units CAPS capsule Take 1 capsule by mouth once a week 4/24/18  Yes Arminda Kohler MD   nitroGLYCERIN (NITROLINGUAL) 0.4 MG/SPRAY 0.4 mg spray Place 1 spray under the tongue every 5 minutes as needed for Chest pain 3/29/18  Yes TIFFANIE Garcia CNP   metoprolol tartrate (LOPRESSOR) 100 MG tablet Take 1 tablet by mouth 2 times daily 11/14/17  Yes Landry Verde MD   losartan (COZAAR) 25 MG tablet Take 1 tablet by mouth daily 4/18/19   Tashi Lundy MD   meclizine (ANTIVERT) 12.5 MG tablet Take 12.5 mg by mouth 3 times daily as needed for Dizziness or Nausea Historical Provider, MD   tiZANidine (ZANAFLEX) 4 MG tablet Take 1 tablet by mouth every 6 hours as needed (prn) 6/21/18   Mignon Echeverria MD   zoledronic acid (RECLAST) 5 MG/100ML SOLN Infuse 5 mg intravenously once IV ,yearly    Historical Provider, MD       PHYSICAL EXAM        Vitals:    05/19/19 0815   BP: 124/68   Pulse: 70   Resp: 18   Temp: 97.6 °F (36.4 °C)   SpO2: 97%    Weight: 147 lb (66.7 kg)     Gen Alert, coop, no distress Heart  RRR, no MRG, nl apical impulse   Head NC, AT, no abnorm Abd  Soft, NT, +BS, no mass, no OM   Eyes PERRLA, conj/corn clear Ext  Ext nl, AT, no C/C/E   Nose Nares nl, no drain, NT Pulse 2+ and symmetric   Throat Lips, mucosa, tongue nl Skin Color/text/turg nl, no rash/lesions   Neck S/S, TM, NT, no bruit/JVD Psych Nl mood and affect   Lung CTA-B, unlabored, no DTP Lymph   No cervical or axillary LA   Ch wall NT, no deform Neuro  Nl gross M/S exam     LABS     CBC:   Lab Results   Component Value Date    WBC 10.3 05/18/2019    RBC 5.23 05/18/2019    HGB 13.6 05/18/2019    HCT 42.9 05/18/2019    MCV 82.1 05/18/2019    RDW 19.2 05/18/2019     05/18/2019     CMP:  Lab Results   Component Value Date     05/18/2019    K 4.3 05/18/2019     05/18/2019    CO2 19 05/18/2019    BUN 40 05/18/2019    CREATININE 1.4 05/18/2019    GFRAA 44 05/18/2019    GFRAA 38 04/02/2013    AGRATIO 1.4 03/21/2018    LABGLOM 36 05/18/2019    GLUCOSE 96 05/18/2019    GLUCOSE 101 01/18/2019    PROT 6.6 04/24/2019    PROT 7.1 10/19/2012    CALCIUM 8.8 05/18/2019    BILITOT 0.3 04/24/2019    BILITOT 0.5 03/19/2018    ALKPHOS 95 04/24/2019    AST 16 04/24/2019    ALT 15 04/24/2019     PT/INR:  No results found for: PTINR  Lab Results   Component Value Date    CKTOTAL 28 12/23/2017    TROPONINI 0.50 (H) 05/18/2019     ASSESSMENT AND PLAN     ~CAD/NSTEMI   Date EF Results   Sx   Cp, sob   [facility] 2/17 8/18  Stable CAD  Unsuccessful  RCA, rec SVG-RCA?    Plan   [facility] tomorrow  Hold xarelto  No

## 2019-05-19 NOTE — PROGRESS NOTES
Patient is having a lot of neuro pain in her legs, mostly the left leg. She is taking 100 mg of gabapentin at night and at home she takes 200mg. I put in a request to the doctor to increase her gabapentin.

## 2019-05-19 NOTE — CARE COORDINATION
Discharge Planning Assessment      met with patient to discuss reason for admission, current living situation, and potential needs at the time of discharge     Demographics/Insurance verified : Yes     Current type of dwelling: One-level home     Living arrangements: Patient reports that she lives at home with her spouse.     Level of function/Support: Patient reports that she is independent in her ADLs. Patient reports that her family is supportive.     PCP: Dr. Azalia Knutson     Last Visit to PCP: 4/18/19     DME: Patient reports that she has DME if she needs it, however, patient identifies no additional DME needs at this time.     Active with any community resources/agencies/skilled home care: None identified.  Patient denies home care needs at this time.     Medication compliance issues: None identified.     Financial issues that could impact healthcare: None identified.     Transportation at the time of discharge: Family will transport.     Tentative discharge plan: Home with family.     Discussed with the patient at the bedside, who identifies no other needs at this time.     Will continue to follow for support and discharge planning.     -Hanna Castillo, MSW, LSW

## 2019-05-20 ENCOUNTER — TELEPHONE (OUTPATIENT)
Dept: FAMILY MEDICINE CLINIC | Age: 79
End: 2019-05-20

## 2019-05-20 VITALS
DIASTOLIC BLOOD PRESSURE: 62 MMHG | SYSTOLIC BLOOD PRESSURE: 104 MMHG | WEIGHT: 145 LBS | HEIGHT: 63 IN | HEART RATE: 71 BPM | BODY MASS INDEX: 25.69 KG/M2 | OXYGEN SATURATION: 98 % | TEMPERATURE: 97.4 F | RESPIRATION RATE: 16 BRPM

## 2019-05-20 LAB
ANION GAP SERPL CALCULATED.3IONS-SCNC: 11 MMOL/L (ref 3–16)
APTT: 61.1 SEC (ref 26–36)
BUN BLDV-MCNC: 24 MG/DL (ref 7–20)
CALCIUM SERPL-MCNC: 9 MG/DL (ref 8.3–10.6)
CHLORIDE BLD-SCNC: 109 MMOL/L (ref 99–110)
CO2: 21 MMOL/L (ref 21–32)
CREAT SERPL-MCNC: 1.3 MG/DL (ref 0.6–1.2)
GFR AFRICAN AMERICAN: 48
GFR NON-AFRICAN AMERICAN: 40
GLUCOSE BLD-MCNC: 91 MG/DL (ref 70–99)
POTASSIUM SERPL-SCNC: 4.1 MMOL/L (ref 3.5–5.1)
SODIUM BLD-SCNC: 141 MMOL/L (ref 136–145)
TROPONIN: 0.3 NG/ML

## 2019-05-20 PROCEDURE — 4A023N7 MEASUREMENT OF CARDIAC SAMPLING AND PRESSURE, LEFT HEART, PERCUTANEOUS APPROACH: ICD-10-PCS | Performed by: ANESTHESIOLOGY

## 2019-05-20 PROCEDURE — B2111ZZ FLUOROSCOPY OF MULTIPLE CORONARY ARTERIES USING LOW OSMOLAR CONTRAST: ICD-10-PCS | Performed by: ANESTHESIOLOGY

## 2019-05-20 PROCEDURE — 99152 MOD SED SAME PHYS/QHP 5/>YRS: CPT

## 2019-05-20 PROCEDURE — 6370000000 HC RX 637 (ALT 250 FOR IP): Performed by: INTERNAL MEDICINE

## 2019-05-20 PROCEDURE — 84484 ASSAY OF TROPONIN QUANT: CPT

## 2019-05-20 PROCEDURE — 2580000003 HC RX 258

## 2019-05-20 PROCEDURE — C1769 GUIDE WIRE: HCPCS

## 2019-05-20 PROCEDURE — 93459 L HRT ART/GRFT ANGIO: CPT | Performed by: INTERNAL MEDICINE

## 2019-05-20 PROCEDURE — C1894 INTRO/SHEATH, NON-LASER: HCPCS

## 2019-05-20 PROCEDURE — 6360000004 HC RX CONTRAST MEDICATION: Performed by: INTERNAL MEDICINE

## 2019-05-20 PROCEDURE — 2709999900 HC NON-CHARGEABLE SUPPLY

## 2019-05-20 PROCEDURE — 85730 THROMBOPLASTIN TIME PARTIAL: CPT

## 2019-05-20 PROCEDURE — 80048 BASIC METABOLIC PNL TOTAL CA: CPT

## 2019-05-20 PROCEDURE — 6370000000 HC RX 637 (ALT 250 FOR IP): Performed by: NURSE PRACTITIONER

## 2019-05-20 PROCEDURE — 2500000003 HC RX 250 WO HCPCS

## 2019-05-20 PROCEDURE — 6360000002 HC RX W HCPCS

## 2019-05-20 PROCEDURE — 2580000003 HC RX 258: Performed by: NURSE PRACTITIONER

## 2019-05-20 PROCEDURE — 99153 MOD SED SAME PHYS/QHP EA: CPT

## 2019-05-20 PROCEDURE — 93459 L HRT ART/GRFT ANGIO: CPT

## 2019-05-20 PROCEDURE — 36415 COLL VENOUS BLD VENIPUNCTURE: CPT

## 2019-05-20 RX ORDER — ATORVASTATIN CALCIUM 40 MG/1
40 TABLET, FILM COATED ORAL NIGHTLY
Qty: 30 TABLET | Refills: 3 | Status: SHIPPED | OUTPATIENT
Start: 2019-05-20 | End: 2019-06-05

## 2019-05-20 RX ORDER — OXYCODONE HYDROCHLORIDE AND ACETAMINOPHEN 5; 325 MG/1; MG/1
1 TABLET ORAL EVERY 8 HOURS PRN
Status: DISCONTINUED | OUTPATIENT
Start: 2019-05-20 | End: 2019-05-20 | Stop reason: HOSPADM

## 2019-05-20 RX ORDER — MORPHINE SULFATE 2 MG/ML
2 INJECTION, SOLUTION INTRAMUSCULAR; INTRAVENOUS EVERY 4 HOURS PRN
Status: DISCONTINUED | OUTPATIENT
Start: 2019-05-20 | End: 2019-05-20

## 2019-05-20 RX ADMIN — IOPAMIDOL 43 ML: 755 INJECTION, SOLUTION INTRAVENOUS at 12:11

## 2019-05-20 RX ADMIN — Medication 10 ML: at 08:01

## 2019-05-20 RX ADMIN — TOPIRAMATE 100 MG: 100 TABLET, FILM COATED ORAL at 07:59

## 2019-05-20 RX ADMIN — AMLODIPINE BESYLATE 10 MG: 5 TABLET ORAL at 07:59

## 2019-05-20 RX ADMIN — CLOPIDOGREL 75 MG: 75 TABLET, FILM COATED ORAL at 07:59

## 2019-05-20 RX ADMIN — OXYCODONE HYDROCHLORIDE AND ACETAMINOPHEN 1 TABLET: 5; 325 TABLET ORAL at 14:28

## 2019-05-20 RX ADMIN — GABAPENTIN 100 MG: 100 CAPSULE ORAL at 07:59

## 2019-05-20 RX ADMIN — METOPROLOL TARTRATE 50 MG: 50 TABLET, FILM COATED ORAL at 07:59

## 2019-05-20 ASSESSMENT — PAIN DESCRIPTION - LOCATION
LOCATION: BACK
LOCATION: BACK;LEG
LOCATION: BACK

## 2019-05-20 ASSESSMENT — PAIN DESCRIPTION - PAIN TYPE
TYPE: CHRONIC PAIN

## 2019-05-20 ASSESSMENT — PAIN SCALES - GENERAL
PAINLEVEL_OUTOF10: 5
PAINLEVEL_OUTOF10: 0
PAINLEVEL_OUTOF10: 2
PAINLEVEL_OUTOF10: 2
PAINLEVEL_OUTOF10: 5
PAINLEVEL_OUTOF10: 2

## 2019-05-20 NOTE — PROGRESS NOTES
Per phone call to Dr Kenya Gauthier ok to discharge today-  Reviewed kidney function and dye and still ok with discharge today per cardiology -hospitalist made aware

## 2019-05-20 NOTE — CARE COORDINATION
[address] Transitions Interview     2019    Patient: Richi Pantoja Patient : 1940   MRN: [id number]  Reason for Admission: Chest pain  RARS: Readmission Risk Score: 14               Readmission Risk  Patient Active Problem List   Diagnosis    Paroxysmal atrial fibrillation ([facility]    Essential hypertension, benign    Mixed hyperlipidemia    Chronic gouty arthropathy    Hypertension    Acquired hypothyroidism    Arthritis    Heart valve problem    Restrictive lung disease    [facility] sinus syndrome ([facility]    Allergic rhinitis    Fibrocystic breast    [facility]vascular accident ([facility]) [facility]    Pacemaker    Typical atrial flutter ([facility]    Primary osteoarthritis involving multiple joints    Vitamin D deficiency    Tremor    Chronic total occlusion of native coronary artery    Age related osteoporosis    CAD S/P percutaneous coronary angioplasty    Chronic systolic congestive heart failure ([facility]    NSTEMI (non-ST elevated myocardial infarction) [facility])       Inpatient Assessment  Care Transitions Summary    Care Transitions Inpatient Review  Medication Review  Do you have all of your prescriptions and are they filled?:  Yes   Are you able to afford your medications?:  Yes  How often do you have difficulty taking your medications?:  I always take them as prescribed. Housing Review  Who do you live with?:  Partner/Spouse/SO  Are you an active caregiver in your home?:  No  Social Support  Durable Medical Equipment  Patient DME:  Other  Other Patient DME:  grab bar in shower   Functional Review  Ability to seek help/take action for Emergent/Urgent situations i.e. fire, crime, inclement weather or health crisis. :  Independent  Ability handle personal hygiene needs (bathing/dressing/grooming): Independent  Ability to manage medications: Independent  Ability to prepare food:  Independent  Ability to maintain home (clean home, laundry):   Independent  Ability to drive and/or has transportation:  Independent  Ability to do shopping:  Independent  Ability to manage finances: Independent  Is patient able to live independently?:  Yes  Hearing and Vision  Care Transitions Interventions         Follow Up: Patient is in cath lab, CTC will attempt to see at a later time. Future Appointments   Date Time Provider Triston oFrd   5/21/2019  3:00 PM Joya Delgadillo PHONE TRANSMISSION  Cardio [facility]   6/11/2019 10:00 AM TIFFANIE Gomez - CNP  Cardio [facility]   6/20/2019 10:30 AM Bill Hardin MD  NEURO [facility]   6/20/2019 10:40 AM Bill Hardin MD  NEURO [facility]   7/22/2019 10:20 AM Radha Bautista MD [facility]       Health Maintenance  There are no preventive care reminders to display for this patient.     Chetna aGmez RN

## 2019-05-20 NOTE — PLAN OF CARE
Problem: Falls - Risk of:  Goal: Will remain free from falls  Description  Will remain free from falls  Outcome: Met This Shift  Note:   Pt remains free from falls and accidental injury at this time. Bed in lowest position with wheels locked. Non skid footwear on feet. Pt instructed to call out for needs, verbalizes understanding. Call light within reach. Bed alarm activated. Will cont to monitor.    Bernadette Saenz RN

## 2019-05-20 NOTE — PLAN OF CARE
Now with back pain 5/10 -now requesting pain medication for pain - 1 po percocet now given for pain - call light in reach - bed alarm on

## 2019-05-20 NOTE — PLAN OF CARE
Awake alert and oriented x 4- NPO for possible cardiac cath today - AV paced rhythm at 70 bpm - no edema - lungs CTA- am meds given with sip h20 - discussed plan of care for the day - heparin drip infusing at 7.8 ml/hr- call light in reach -bed alarm on

## 2019-05-20 NOTE — PLAN OF CARE
Iv site left forearm removed with no bleeding or discoloration at site- all belongings bagged and to car with family - at 65 to lobby by wheelchair on room air with RN as transporter- left groin site soft dry no bleeding at site no hematoma- son in law driving to home

## 2019-05-20 NOTE — PLAN OF CARE
Telemetry removed for discharge today - left groin site soft dry no bleeding at site- old bloody drainage on leg but site clean no bleeding- up to bathroom with use of walker- now dressing for discharge- vitals stable- pedal pulses equal and strong - denies any needs for home- discharge to home - live with spouse- son-in-law taking to home

## 2019-05-20 NOTE — PLAN OF CARE
To cath lab by bed on room air with heparin infusing at 7.8 ml/hr with cath lab RNs - family at bedside

## 2019-05-20 NOTE — PLAN OF CARE
Reviewed discharge instructions reviewed new medication Lipitor- aware of e script to pharmacy- reviewed purpose and side effects- reviewed remaining home medications reviewed f/u with cardiology 6-11 and device check on 5-21- acknowledged understanding of all - denies any needs for home -now to lobby by wheelchair on room air with RN as transporter- telemetry removed for discharge

## 2019-05-20 NOTE — OP NOTE
[address]   Procedure Note      Procedure: [facility]  Indication: nstemi  Consent: Verbal and/or written consent obtained prior to procedure. Sedation: Minimal conscious sedation utilized for comfort.     Complic: None  EBL:  <81QO  Specimens: None  Fluoro: 6.4 min  Contrast: 43 cc  Access: RCFA  Findings:    LM Normal   LAD 50% ostial, mid 100%   Cx 40% OM1 at bifurcation   RCA Mid 100%   LVG Not performed   EDP 15 mmHg   L-LAD Patent   S-PDA Known occluded  Severe Ca++:None  Post Cath Dx:Stable CAD, no apparent culprit for NSTEMI  Intervention: None  Med Rec: Continue aggressive med tx    Continue plavix, no asa due to allergy

## 2019-05-20 NOTE — PRE SEDATION
Brief Pre-Op Note/Sedation Assessment      Minta Necessary  [birth year]  [id number]  [id number]  8:26 AM    Planned Procedure: Cardiac Catheterization Procedure    Post Procedure Plan: Return to same level of care    Consent: I have discussed with the patient and/or the patient representative the indication, alternatives, and the possible risks and/or complications of the planned procedure and the anesthesia methods. The patient and/or patient representative appear to understand and agree to proceed. Chief Complaint: NSTEMI      Indications for Cath Procedure:  ACS > 24 hrs, New Onset Angina <= 2 months and Worsening Angina  Anginal Classification within 2 weeks:  CCS IV - Inability to perform any activity without angina or angina at rest, i.e., severe limitation  NYHA Heart Failure Class within 2 weeks: No symptoms    Anti- Anginal Meds within 2 weeks:   Yes: Ca Channel Blockers and Statin (Any)    Stress or Imaging Studies Performed:  None    Vital Signs:  /68   Pulse 70   Temp 97.4 °F (36.3 °C) (Oral)   Resp 18   Ht 5' 3\" (1.6 m)   Wt 145 lb (65.8 kg)   SpO2 96%   BMI 25.69 kg/m²     Allergies:   Allergies   Allergen Reactions    Aspirin Nausea Only    Ativan [Lorazepam] Other (See Comments)     hallucinations    Diltiazem Anaphylaxis    Sulfa Antibiotics Rash and Hives    Dabigatran Nausea Only     And indigestion  And indigestion    Aka Pradaxa    Mysoline [Primidone]     Nsaids     Other Other (See Comments)     Nitroglycerin patches causes severe headaches       Past Medical History:  Past Medical History:   Diagnosis Date    Allergic rhinitis, cause unspecified     Arthritis     Atrial fibrillation ([address]     Bronchopneumonia     CAD (coronary artery disease)     stent:  post cataract surgery (CABG)    Cerebral artery occlusion with cerebral infarction (HCC)     TIA    Chronic gouty arthropathy     Chronic kidney disease     Congestive heart failure, unspecified     Degeneration of cervical intervertebral disc     Essential and other specified forms of tremor     Gout     HIGH CHOLESTEROL     History of CVA (cerebrovascular accident)     Hypertension     Influenza 12/23/2017    Mitral valve stenosis and aortic valve insufficiency     Movement disorder     back problems    Pacemaker     Peptic ulcer, unspecified site, unspecified as acute or chronic, without mention of hemorrhage, perforation, or obstruction     Thyroid disease     Unspecified disorder of kidney and ureter     Unspecified hypothyroidism          Surgical History:  Past Surgical History:   Procedure Laterality Date    BACK SURGERY      CARDIAC CATHETERIZATION  7/2012    CARDIAC CATHETERIZATION  08/07/2018    Unsuccesful  of RCA    CARDIAC SURGERY      CABG & Cardiac ablation    CHOLECYSTECTOMY      CORONARY ARTERY BYPASS GRAFT  1987    LIMA- Diag/LAD, SVG- RCA    HIP SURGERY Left 03/16/2017    Left hip pinning    JOINT REPLACEMENT      SD INJECT ANES/STEROID FORAMEN LUMBAR/SACRAL W IMG GUIDE ,1 LEVEL Right 12/3/2018    RIGHT L3 AND L4 LUMBAR TRANSFORAMINAL EPIRUAL STEROID INJECTION WITH FLUOROSCOPY performed by Chapo Alvarado MD at 2011 [facility]  11/04/2014    MARLENY - 3.0 x 28 to the [facility] [address]      left         Medications:  Current Facility-Administered Medications   Medication Dose Route Frequency Provider Last Rate Last Dose    gabapentin (NEURONTIN) capsule 100 mg  100 mg Oral BID Ok Caldwell MD   100 mg at 05/20/19 0759    benzocaine (ORAJEL) 20 % mucosal gel   Mouth/Throat BID PRN Ok Caldwell MD        heparin (porcine) injection 5,190 Units  80 Units/kg Intravenous PRN Rosanna Edmonds APRN - CNP        heparin (porcine) injection 2,600 Units  40 Units/kg Intravenous PRN Rosanna Edmonds, APRN - CNP        heparin 25,000 units in dextrose 5% 250 mL infusion  18 Units/kg/hr Intravenous Continuous Funmi TIFFANIE Austin CNP 7.8 mL/hr at 05/19/19 0151 12.018 Units/kg/hr at 05/19/19 0151    sodium chloride flush 0.9 % injection 10 mL  10 mL Intravenous 2 times per day [facility], APRN - CNP   10 mL at 05/20/19 0801    sodium chloride flush 0.9 % injection 10 mL  10 mL Intravenous PRN Funmi Hissett, APRN - CNP        magnesium hydroxide (MILK OF MAGNESIA) 400 MG/5ML suspension 30 mL  30 mL Oral Daily PRN Funmi Hissett, APRN - CNP        ondansetron (ZOFRAN) injection 4 mg  4 mg Intravenous Q6H PRN Funmi Hissett, APRN - CNP        atorvastatin (LIPITOR) tablet 40 mg  40 mg Oral Nightly Funmi Hissett, APRN - CNP   40 mg at 05/19/19 2106    bisacodyl (DULCOLAX) suppository 10 mg  10 mg Rectal Daily PRN Funmi Hissett, APRN - CNP        acetaminophen (TYLENOL) tablet 650 mg  650 mg Oral Q4H PRN Funmi Hissett, APRN - CNP   650 mg at 05/18/19 1952    metoprolol tartrate (LOPRESSOR) tablet 50 mg  50 mg Oral BID Funmi Hissett, APRN - CNP   50 mg at 05/20/19 0759    lisinopril (PRINIVIL;ZESTRIL) tablet 5 mg  5 mg Oral Daily Funmi Hissett, APRN - CNP   5 mg at 05/19/19 0837    nitroGLYCERIN (NITROSTAT) SL tablet 0.4 mg  0.4 mg Sublingual Q5 Min PRN Funmi Hissett, APRN - CNP        amLODIPine (NORVASC) tablet 10 mg  10 mg Oral Daily Funmi Hissett, APRN - CNP   10 mg at 05/20/19 0759    clopidogrel (PLAVIX) tablet 75 mg  75 mg Oral Daily Funmi Hissett, APRN - CNP   75 mg at 05/20/19 0759    losartan (COZAAR) tablet 25 mg  25 mg Oral Daily Funmi Hissett, APRN - CNP   25 mg at 05/18/19 0957    tiZANidine (ZANAFLEX) tablet 4 mg  4 mg Oral Q6H PRN Funmi Hissett, APRN - CNP        topiramate (TOPAMAX) tablet 100 mg  100 mg Oral BID Funmi Hissett, APRN - CNP   100 mg at 05/20/19 7923    levothyroxine (SYNTHROID) tablet 100 mcg  100 mcg Oral QAM AC Moose Buchanan MD   Stopped at 05/20/19 1464           Pre-Sedation:    Pre-Sedation Documentation and Exam:  I have assessed the patient and agree with the H&P present on the chart.     Prior History of

## 2019-05-20 NOTE — PLAN OF CARE
Return from cath lab- left groin site soft dry no bleeding at site- vitals stable -daughter at bedside- supine position until 1615 - acknowledged understanding -back pain 5/10 -denies need for any pain medication at this time-call light in reach bed alarm on

## 2019-05-20 NOTE — PLAN OF CARE
Left groin site soft dry no bleeding no hematoma- vitals stable- supine until 1615 - daughter at bedside - call light in reach

## 2019-05-20 NOTE — DISCHARGE SUMMARY
75     HTN: controlled. H/o CKD stage 3- stable. Okayed by cardiology for discharge on the same day of angiogram procedure. Physical Exam Performed:     /64   Pulse 70   Temp 97.4 °F (36.3 °C) (Oral)   Resp 16   Ht 5' 3\" (1.6 m)   Wt 145 lb (65.8 kg)   SpO2 98%   BMI 25.69 kg/m²       General appearance:  No apparent distress, appears stated age and cooperative. HEENT:  Normal cephalic, atraumatic without obvious deformity. Pupils equal, round, and reactive to light. Extra ocular muscles intact. Conjunctivae/corneas clear. Neck: Supple, with full range of motion. No jugular venous distention. Trachea midline. Respiratory:  Normal respiratory effort. Clear to auscultation, bilaterally without Rales/Wheezes/Rhonchi. Cardiovascular:  Regular rate and rhythm with normal S1/S2 without murmurs, rubs or gallops. Abdomen: Soft, non-tender, non-distended with normal bowel sounds. Musculoskeletal:  No clubbing, cyanosis or edema bilaterally. Full range of motion without deformity. Skin: Skin color, texture, turgor normal.  No rashes or lesions. Neurologic:  Neurovascularly intact without any focal sensory/motor deficits. Cranial nerves: II-XII intact, grossly non-focal.  Psychiatric:  Alert and oriented, thought content appropriate, normal insight  Capillary Refill: Brisk,< 3 seconds   Peripheral Pulses: +2 palpable, equal bilaterally       Labs:  For convenience and continuity at follow-up the following most recent labs are provided:      CBC:    Lab Results   Component Value Date    WBC 10.3 05/18/2019    HGB 13.6 05/18/2019    HCT 42.9 05/18/2019     05/18/2019       Renal:    Lab Results   Component Value Date     05/20/2019    K 4.1 05/20/2019    K 4.3 05/18/2019     05/20/2019    CO2 21 05/20/2019    BUN 24 05/20/2019    CREATININE 1.3 05/20/2019    CALCIUM 9.0 05/20/2019    PHOS 3.0 12/23/2017         Significant Diagnostic Studies    Radiology:   XR CHEST PORTABLE   Final Result   Cardiomegaly with mild pulmonary vascular congestion. Possible small left   effusion. Consults:     IP CONSULT TO CARDIOLOGY  IP CONSULT TO HOSPITALIST  PHARMACY TO DOSE MEDICATION  IP CONSULT TO SPIRITUAL SERVICES    Disposition:  Home      Condition at Discharge: Stable    Discharge Instructions/Follow-up:  F/u with PCP in 1 week- BMp check at that time    Code Status:  Full Code     Activity: activity as tolerated    Diet: cardiac diet      Discharge Medications:     Current Discharge Medication List           Details   atorvastatin (LIPITOR) 40 MG tablet Take 1 tablet by mouth nightly  Qty: 30 tablet, Refills: 3              Details   XARELTO 15 MG TABS tablet TAKE 1 TABLET DAILY WITH BREAKFAST  Qty: 90 tablet, Refills: 3      !! allopurinol (ZYLOPRIM) 300 MG tablet Take 1 tablet by mouth daily  Qty: 90 tablet, Refills: 1      levothyroxine (SYNTHROID) 112 MCG tablet TAKE 1 TABLET DAILY  Qty: 90 tablet, Refills: 1      torsemide (DEMADEX) 20 MG tablet 1-2 qd  Qty: 60 tablet, Refills: 5      gabapentin (NEURONTIN) 100 MG capsule Take one capsule at night for 1 week and then 2 capsules at night  Qty: 60 capsule, Refills: 1      amLODIPine (NORVASC) 10 MG tablet Take 1 tablet by mouth daily  Qty: 90 tablet, Refills: 0      topiramate (TOPAMAX) 100 MG tablet Take 1 tablet by mouth 2 times daily  Qty: 180 tablet, Refills: 3      clopidogrel (PLAVIX) 75 MG tablet Take 1 tablet by mouth daily  Qty: 90 tablet, Refills: 2      !! allopurinol (ZYLOPRIM) 100 MG tablet Take 1 tablet by mouth daily Take along with 300mg tablet for total of 400mg.   Qty: 90 tablet, Refills: 3      vitamin D (ERGOCALCIFEROL) 73429 units CAPS capsule Take 1 capsule by mouth once a week  Qty: 12 capsule, Refills: 1      nitroGLYCERIN (NITROLINGUAL) 0.4 MG/SPRAY 0.4 mg spray Place 1 spray under the tongue every 5 minutes as needed for Chest pain  Qty: 1 Bottle, Refills: 3      metoprolol tartrate (LOPRESSOR) 100 MG tablet Take 1 tablet by mouth 2 times daily  Qty: 180 tablet, Refills: 3      losartan (COZAAR) 25 MG tablet Take 1 tablet by mouth daily  Qty: 90 tablet, Refills: 1      meclizine (ANTIVERT) 12.5 MG tablet Take 12.5 mg by mouth 3 times daily as needed for Dizziness or Nausea      tiZANidine (ZANAFLEX) 4 MG tablet Take 1 tablet by mouth every 6 hours as needed (prn)  Qty: 100 tablet, Refills: 0      zoledronic acid (RECLAST) 5 MG/100ML SOLN Infuse 5 mg intravenously once IV ,yearly       !! - Potential duplicate medications found. Please discuss with provider. Time Spent on discharge is more than 30 minutes in the examination, evaluation, counseling and review of medications and discharge plan. Signed:    Troy Pena MD   5/20/2019      Thank you Leighann Blandon MD for the opportunity to be involved in this patient's care. If you have any questions or concerns please feel free to contact me at [phone].

## 2019-05-21 ENCOUNTER — NURSE ONLY (OUTPATIENT)
Dept: CARDIOLOGY CLINIC | Age: 79
End: 2019-05-21
Payer: MEDICARE

## 2019-05-21 ENCOUNTER — TELEPHONE (OUTPATIENT)
Dept: FAMILY MEDICINE CLINIC | Age: 79
End: 2019-05-21

## 2019-05-21 ENCOUNTER — CARE COORDINATION (OUTPATIENT)
Dept: CASE MANAGEMENT | Age: 79
End: 2019-05-21

## 2019-05-21 DIAGNOSIS — I21.4 NSTEMI (NON-ST ELEVATED MYOCARDIAL INFARCTION) (HCC): Primary | ICD-10-CM

## 2019-05-21 DIAGNOSIS — I49.5 SICK SINUS SYNDROME (HCC): ICD-10-CM

## 2019-05-21 DIAGNOSIS — Z95.0 PACEMAKER: ICD-10-CM

## 2019-05-21 PROCEDURE — 1111F DSCHRG MED/CURRENT MED MERGE: CPT | Performed by: FAMILY MEDICINE

## 2019-05-21 PROCEDURE — 93296 REM INTERROG EVL PM/IDS: CPT | Performed by: INTERNAL MEDICINE

## 2019-05-21 PROCEDURE — 93294 REM INTERROG EVL PM/LDLS PM: CPT | Performed by: INTERNAL MEDICINE

## 2019-05-21 NOTE — CARE COORDINATION
AM Venkat Romo MD FF NEURO [facility]   7/22/2019 10:20 AM Carlos Ponce MD [facility]   9/3/2019 11:00 AM SCHEDULE, [place] PHONE TRANSMISSION FF Cardio [facility]       Nathen Swanson RN

## 2019-05-21 NOTE — TELEPHONE ENCOUNTER
Patient is scheduled on 6/5/19 spoke to patient to try to get her in earlier she states that she wants to see only Dr. Pebbles Connors

## 2019-05-28 ENCOUNTER — CARE COORDINATION (OUTPATIENT)
Dept: CASE MANAGEMENT | Age: 79
End: 2019-05-28

## 2019-05-28 DIAGNOSIS — M1A.00X0 IDIOPATHIC CHRONIC GOUT WITHOUT TOPHUS, UNSPECIFIED SITE: ICD-10-CM

## 2019-05-28 NOTE — CARE COORDINATION
[facility] Transitions Follow Up Call    2019    Patient: Jeramy July  Patient : 1940   MRN: [id number]  Reason for Admission: chest pain  Discharge Date: 19 RARS: Readmission Risk Score: 14         Spoke with: Charly 32 Transitions Subsequent and Final Call    Subsequent and Final Calls  Do you have any ongoing symptoms?:  No  Have your medications changed?:  No  Do you have any questions related to your medications?:  No  Do you currently have any active services?:  No  Do you have any needs or concerns that I can assist you with?:  No  Identified Barriers:  None  Care Transitions Interventions  Other Interventions: Follow Up: Patient reports that she is doing well, denies chest pain, SOB. She will follow up with PCP 19. She denies any questions or concerns at this time. CTC will continue with outreach follow up calls.     Future Appointments   Date Time Provider Triston Ford   2019 11:10 AM Reyes Ying MD [facility]   2019 10:00 AM Rickey Caraballo APRN - CNP  Cardio [facility]   2019 10:30 AM Peyton Melissa MD  NEURO [facility]   2019 10:40 AM Peyton Melissa MD  NEURO [facility]   2019 10:20 AM Reyes Ying MD [facility]   9/3/2019 11:00 AM SCHEDULE, [place] PHONE TRANSMISSION  Cardio [facility]       Teressa Clark RN

## 2019-05-29 LAB — URIC ACID, SERUM: 4.8 MG/DL (ref 2.6–6)

## 2019-06-03 ENCOUNTER — PROCEDURE VISIT (OUTPATIENT)
Dept: NEUROLOGY | Age: 79
End: 2019-06-03
Payer: MEDICARE

## 2019-06-03 ENCOUNTER — OFFICE VISIT (OUTPATIENT)
Dept: NEUROLOGY | Age: 79
End: 2019-06-03
Payer: MEDICARE

## 2019-06-03 VITALS
HEIGHT: 63 IN | DIASTOLIC BLOOD PRESSURE: 70 MMHG | SYSTOLIC BLOOD PRESSURE: 164 MMHG | WEIGHT: 144 LBS | BODY MASS INDEX: 25.52 KG/M2 | HEART RATE: 70 BPM

## 2019-06-03 DIAGNOSIS — M79.2 NEUROPATHIC PAIN: ICD-10-CM

## 2019-06-03 DIAGNOSIS — M79.605 BILATERAL LEG PAIN: ICD-10-CM

## 2019-06-03 DIAGNOSIS — G60.9 IDIOPATHIC PERIPHERAL NEUROPATHY: Primary | ICD-10-CM

## 2019-06-03 DIAGNOSIS — R20.0 BILATERAL LEG NUMBNESS: Primary | ICD-10-CM

## 2019-06-03 DIAGNOSIS — M79.604 BILATERAL LEG PAIN: ICD-10-CM

## 2019-06-03 DIAGNOSIS — I73.9 CLAUDICATION OF BOTH LOWER EXTREMITIES (HCC): ICD-10-CM

## 2019-06-03 PROCEDURE — G8427 DOCREV CUR MEDS BY ELIG CLIN: HCPCS | Performed by: PSYCHIATRY & NEUROLOGY

## 2019-06-03 PROCEDURE — 95886 MUSC TEST DONE W/N TEST COMP: CPT | Performed by: PSYCHIATRY & NEUROLOGY

## 2019-06-03 PROCEDURE — G8399 PT W/DXA RESULTS DOCUMENT: HCPCS | Performed by: PSYCHIATRY & NEUROLOGY

## 2019-06-03 PROCEDURE — 4040F PNEUMOC VAC/ADMIN/RCVD: CPT | Performed by: PSYCHIATRY & NEUROLOGY

## 2019-06-03 PROCEDURE — 1111F DSCHRG MED/CURRENT MED MERGE: CPT | Performed by: PSYCHIATRY & NEUROLOGY

## 2019-06-03 PROCEDURE — 1123F ACP DISCUSS/DSCN MKR DOCD: CPT | Performed by: PSYCHIATRY & NEUROLOGY

## 2019-06-03 PROCEDURE — 95909 NRV CNDJ TST 5-6 STUDIES: CPT | Performed by: PSYCHIATRY & NEUROLOGY

## 2019-06-03 PROCEDURE — 99213 OFFICE O/P EST LOW 20 MIN: CPT | Performed by: PSYCHIATRY & NEUROLOGY

## 2019-06-03 PROCEDURE — 1036F TOBACCO NON-USER: CPT | Performed by: PSYCHIATRY & NEUROLOGY

## 2019-06-03 PROCEDURE — G8598 ASA/ANTIPLAT THER USED: HCPCS | Performed by: PSYCHIATRY & NEUROLOGY

## 2019-06-03 PROCEDURE — G8417 CALC BMI ABV UP PARAM F/U: HCPCS | Performed by: PSYCHIATRY & NEUROLOGY

## 2019-06-03 PROCEDURE — 1090F PRES/ABSN URINE INCON ASSESS: CPT | Performed by: PSYCHIATRY & NEUROLOGY

## 2019-06-03 RX ORDER — GABAPENTIN 100 MG/1
CAPSULE ORAL
Qty: 90 CAPSULE | Refills: 2 | Status: ON HOLD | OUTPATIENT
Start: 2019-06-03 | End: 2019-09-30

## 2019-06-03 NOTE — PROGRESS NOTES
[facility]    Subjective:   CC/HP  History was obtained from the patient. Patient is here for return follow-up visit as well as EMG studies of the lower extremities. Patient states that she continues to have leg pain. Now the left leg seems to be more involved than the right leg. She has burning tingling and numbness. Detailed history:  Symptoms started about 3 months ago. Onset was gradual and the symptoms of slowly progressive. Symptoms are worse at night. She described this as a burning pain in her legs and feet. Right side is worse than the left. She denies any symptoms in the arms. Patient is on topiramate for benign essential tremor. Patient is also on Zanaflex. Patient has chronic arthritis in her lower back. Patient has coronary artery disease as well as atrial fibrillation. She is on Xarelto as well as Plavix.     Patient has known benign essential tremors and is on topiramate    REVIEW OF SYSTEMS    Constitutional:  []   Chills   []  Fatigue   []  Fevers   []  Malaise   []  Weight loss     [x] Denies all of the above    Respiratory:   []  Cough    []  Shortness of breath         [x] Denies all of the above     Cardiovascular:   []  Chest pain    []  Exertional chest pressure/discomfort           [] Palpitations    []  Syncope     [x] Denies all of the above        Past Medical History:   Diagnosis Date    Allergic rhinitis, cause unspecified     Arthritis     Atrial fibrillation ([address]     Bronchopneumonia     CAD (coronary artery disease)     stent:  post cataract surgery (CABG)    Cerebral artery occlusion with cerebral infarction ([address]     TIA    Chronic gouty arthropathy     Chronic kidney disease     Congestive heart failure, unspecified     Degeneration of cervical intervertebral disc     Essential and other specified forms of tremor     Gout     HIGH CHOLESTEROL     History of CVA (cerebrovascular accident)     Hypertension     Influenza partner violence:     Fear of current or ex partner: None     Emotionally abused: None     Physically abused: None     Forced sexual activity: None   Other Topics Concern    None   Social History Narrative    None        Objective:  Exam:  BP (!) 164/70   Pulse 70   Ht 5' 3\" (1.6 m)   Wt 144 lb (65.3 kg)   BMI 25.51 kg/m²   This is a well-nourished patient in no acute distress  Patient is awake, alert and oriented x3. Speech is normal.  Pupils are equal round reacting to light. Extraocular movements intact. Face symmetrical. Tongue midline. Motor exam shows normal symmetrical strength. Deep tendon reflexes decreased in the lower extremities. Plantar reflexes downgoing. Sensory exam showed decreased light touch in the distal lower extremities Coordination normal. Gait normal. No carotid bruit. No neck stiffness. Data :  LABS:  General Labs:    CBC:   Lab Results   Component Value Date    WBC 10.3 05/18/2019    RBC 5.23 05/18/2019    HGB 13.6 05/18/2019    HCT 42.9 05/18/2019    MCV 82.1 05/18/2019    MCH 26.0 05/18/2019    MCHC 31.6 05/18/2019    RDW 19.2 05/18/2019     05/18/2019    MPV 8.7 05/18/2019     BMP:    Lab Results   Component Value Date     05/20/2019    K 4.1 05/20/2019    K 4.3 05/18/2019     05/20/2019    CO2 21 05/20/2019    BUN 24 05/20/2019    LABALBU 3.7 04/24/2019    CREATININE 1.3 05/20/2019    CALCIUM 9.0 05/20/2019    GFRAA 48 05/20/2019    GFRAA 38 04/02/2013    LABGLOM 40 05/20/2019    GLUCOSE 91 05/20/2019    GLUCOSE 101 01/18/2019       Impression :  Idiopathic peripheral neuropathy  EMG and nerve conduction studies done in the office today of the lower extremities confirmed a mild peripheral neuropathy. The sensory fibers were more affected than the motor fibers.   TSH B12 and serum protein immunofixation were normal.  Neuropathic pain  Benign essential tremor on Topamax  Atrial fibrillation on Xarelto  Patient had been on amiodarone the past which is known to cause neuropathy  We have to consider and rule out vascular claudication in the legs as well    Plan :  Discussed with patient  Explained EMG findings  Continue gabapentin. Patient now takes 100 mg in the morning and 200 mg at night. I will get arterial Doppler studies of both lower extremities to rule out any vascular claudication  Return in 3 months        Please note a portion of  this chart was generated using dragon dictation software. Although every effort was made to ensure the accuracy of this automated transcription, some errors in transcription may have occurred.

## 2019-06-03 NOTE — PROGRESS NOTES
Corina Grimes M.D. [facility]) Physicians/[facility]  Board Certified in [address] [facility] [address] [place] [address], [address]    EMG / NERVE CONDUCTION STUDY    PATIENT:     Vidal Brewer      DATE OF EM/3/2019    YOB: 1940       REASON FOR EMG:   Bilateral leg pain, peripheral neuropathy     REFERRING PHYSICIAN:  Corina Grimes M.D.    Mellisa Hof:  The right peroneal motor nerve study was normal.  The left peroneal motor nerve studies had a low amplitude but normal conduction velocity. Bilateral posterior tibial motor nerve studies of mild slowing of conduction velocities. The right sural sensory and left superficial peroneal sensory nerve studies were not recordable. Needle EMG of several muscles in both lower extremities was normal.     CLINICAL DIAGNOSIS:  Peripheral neuropathy     EMG RESULTS:   This patient has a mild generalized sensorimotor mixed polyneuropathy in bilateral lower extremities. _____________________________  Corina Grimes M.D.   Electromyographer/Neurologist

## 2019-06-04 ENCOUNTER — CARE COORDINATION (OUTPATIENT)
Dept: CASE MANAGEMENT | Age: 79
End: 2019-06-04

## 2019-06-04 NOTE — CARE COORDINATION
[facility] Transitions Follow Up Call    2019    Patient: Vidal Brewer  Patient : 1940   MRN: [id number]  Reason for Admission:   Discharge Date: 19 RARS: Readmission Risk Score: 15         Spoke with: Emily Coleman [facility] Trans[facility] Subsequent and Final Call    Subsequent and Final Calls  Do you have any ongoing symptoms?:  No  Have your medications changed?:  No  Do you have any questions related to your medications?:  No  Do you currently have any active services?:  No  Do you have any needs or concerns that I can assist you with?:  No  Identified Barriers:  None  Care Transitions Interventions  No Identified Needs  Other Interventions:          Pt states doing well, no issues or concerns. F/U appts listed below.  No need for further f/u CTC calls    Follow Up  Future Appointments   Date Time Provider [place]   2019 11:10 AM Monserrat Smith MD [facility]   2019  8:30 AM VASCULAR LAB ROOM 2-[facility] None   2019 10:00 AM TIFFANIE Cisse - CNP FF Cardio [facility]   2019 10:20 AM Monserrat Smith MD [facility]   9/3/2019 11:00 AM SCHEDULE, [place] REMOTE TRANSMISSION FF Cardio [facility]   9/3/2019  2:00 PM Corina Grimes MD FF NEURO [facility]       Grecia Locke, RN

## 2019-06-05 ENCOUNTER — OFFICE VISIT (OUTPATIENT)
Dept: FAMILY MEDICINE CLINIC | Age: 79
End: 2019-06-05
Payer: MEDICARE

## 2019-06-05 VITALS
SYSTOLIC BLOOD PRESSURE: 134 MMHG | WEIGHT: 148.6 LBS | HEART RATE: 70 BPM | BODY MASS INDEX: 26.32 KG/M2 | OXYGEN SATURATION: 97 % | DIASTOLIC BLOOD PRESSURE: 72 MMHG

## 2019-06-05 DIAGNOSIS — I50.22 CHRONIC SYSTOLIC CONGESTIVE HEART FAILURE (HCC): ICD-10-CM

## 2019-06-05 DIAGNOSIS — J98.4 RESTRICTIVE LUNG DISEASE: ICD-10-CM

## 2019-06-05 DIAGNOSIS — I21.4 NSTEMI (NON-ST ELEVATED MYOCARDIAL INFARCTION) (HCC): Primary | ICD-10-CM

## 2019-06-05 DIAGNOSIS — I10 ESSENTIAL HYPERTENSION: ICD-10-CM

## 2019-06-05 DIAGNOSIS — E78.2 MIXED HYPERLIPIDEMIA: Chronic | ICD-10-CM

## 2019-06-05 PROCEDURE — 1123F ACP DISCUSS/DSCN MKR DOCD: CPT | Performed by: FAMILY MEDICINE

## 2019-06-05 PROCEDURE — 1036F TOBACCO NON-USER: CPT | Performed by: FAMILY MEDICINE

## 2019-06-05 PROCEDURE — 1111F DSCHRG MED/CURRENT MED MERGE: CPT | Performed by: FAMILY MEDICINE

## 2019-06-05 PROCEDURE — G8427 DOCREV CUR MEDS BY ELIG CLIN: HCPCS | Performed by: FAMILY MEDICINE

## 2019-06-05 PROCEDURE — G8598 ASA/ANTIPLAT THER USED: HCPCS | Performed by: FAMILY MEDICINE

## 2019-06-05 PROCEDURE — G8399 PT W/DXA RESULTS DOCUMENT: HCPCS | Performed by: FAMILY MEDICINE

## 2019-06-05 PROCEDURE — 4040F PNEUMOC VAC/ADMIN/RCVD: CPT | Performed by: FAMILY MEDICINE

## 2019-06-05 PROCEDURE — 1090F PRES/ABSN URINE INCON ASSESS: CPT | Performed by: FAMILY MEDICINE

## 2019-06-05 PROCEDURE — G8417 CALC BMI ABV UP PARAM F/U: HCPCS | Performed by: FAMILY MEDICINE

## 2019-06-05 PROCEDURE — 99214 OFFICE O/P EST MOD 30 MIN: CPT | Performed by: FAMILY MEDICINE

## 2019-06-05 RX ORDER — ROSUVASTATIN CALCIUM 5 MG/1
5 TABLET, COATED ORAL NIGHTLY
Qty: 90 TABLET | Refills: 3 | Status: SHIPPED | OUTPATIENT
Start: 2019-06-05 | End: 2019-11-22

## 2019-06-05 NOTE — PROGRESS NOTES
Subjective:      Patient ID: Jacques Galeazzi is a 66 y.o. female. CC: Patient presents for hospital follow-up. [facility] Patient presents for a follow-up from [facility].  Patient went to [address] [facility] ED by squad due to chest pains. Patient tried Nitro spray but it didn't help. So she had to call the squad. Patient states she was given Nitro and 4 baby aspirin. Patient was admitted due to chest pain on 5/17/19 and was discharged home on 5/20/19. Patient was told she had a mild heart attack. Patient had her last transitions of care call 5-. Patient has been off her Lipitor due to muscle pains and soreness. She has taken Crestor in the past and it doesn't give her any issues. The chest pains have stopped now. Patient underwent cardiac catheterization during the hospitalization and no intervention was required. Patient was advised to restart cholesterol medication.     Review of Systems   Patient Active Problem List   Diagnosis    Paroxysmal atrial fibrillation (HCC)    Essential hypertension, benign    Mixed hyperlipidemia    Chronic gouty arthropathy    Hypertension    Acquired hypothyroidism    Arthritis    Heart valve problem    Restrictive lung disease    Sick sinus syndrome (HCC)    Allergic rhinitis    Fibrocystic breast    Cerebrovascular accident (CVA) ([facility] [address]    Pacemaker    Typical atrial flutter (HCC)    Primary osteoarthritis involving multiple joints    Vitamin D deficiency    Tremor    Chronic total occlusion of native coronary artery    Age related osteoporosis    CAD S/P percutaneous coronary angioplasty    Chronic systolic congestive heart failure (HCC)    NSTEMI (non-ST elevated myocardial infarction) [facility])       Outpatient Medications Marked as Taking for the 6/5/19 encounter (Office Visit) with Donovan Gamez MD   Medication Sig Dispense Refill    gabapentin (NEURONTIN) 100 MG capsule Take one capsule in the morning and 2 capsules at night I'm 90 capsule 2    XARELTO 15 MG TABS tablet TAKE 1 TABLET DAILY WITH BREAKFAST 90 tablet 3    allopurinol (ZYLOPRIM) 300 MG tablet Take 1 tablet by mouth daily 90 tablet 1    levothyroxine (SYNTHROID) 112 MCG tablet TAKE 1 TABLET DAILY 90 tablet 1    torsemide (DEMADEX) 20 MG tablet 1-2 qd 60 tablet 5    losartan (COZAAR) 25 MG tablet Take 1 tablet by mouth daily 90 tablet 1    amLODIPine (NORVASC) 10 MG tablet Take 1 tablet by mouth daily 90 tablet 0    topiramate (TOPAMAX) 100 MG tablet Take 1 tablet by mouth 2 times daily 180 tablet 3    clopidogrel (PLAVIX) 75 MG tablet Take 1 tablet by mouth daily 90 tablet 2    meclizine (ANTIVERT) 12.5 MG tablet Take 12.5 mg by mouth 3 times daily as needed for Dizziness or Nausea      allopurinol (ZYLOPRIM) 100 MG tablet Take 1 tablet by mouth daily Take along with 300mg tablet for total of 400mg.  90 tablet 3    tiZANidine (ZANAFLEX) 4 MG tablet Take 1 tablet by mouth every 6 hours as needed (prn) 100 tablet 0    zoledronic acid (RECLAST) 5 MG/100ML SOLN Infuse 5 mg intravenously once IV ,yearly      vitamin D (ERGOCALCIFEROL) 06167 units CAPS capsule Take 1 capsule by mouth once a week 12 capsule 1    nitroGLYCERIN (NITROLINGUAL) 0.4 MG/SPRAY 0.4 mg spray Place 1 spray under the tongue every 5 minutes as needed for Chest pain 1 Bottle 3    metoprolol tartrate (LOPRESSOR) 100 MG tablet Take 1 tablet by mouth 2 times daily 180 tablet 3       Allergies   Allergen Reactions    Aspirin Nausea Only    Ativan [Lorazepam] Other (See Comments)     hallucinations    Diltiazem Anaphylaxis    Sulfa Antibiotics Rash and Hives    Dabigatran Nausea Only     And indigestion  And indigestion    Aka Pradaxa    Lipitor [Atorvastatin] Other (See Comments)     Muscle pains    Mysoline [Primidone]     Nsaids     Other Other (See Comments)     Nitroglycerin patches causes severe headaches       Social History     Tobacco Use    Smoking status: Former Smoker     Packs/day: 1.00 errors in transcription may have occurred.

## 2019-06-11 ENCOUNTER — OFFICE VISIT (OUTPATIENT)
Dept: CARDIOLOGY CLINIC | Age: 79
End: 2019-06-11
Payer: MEDICARE

## 2019-06-11 ENCOUNTER — HOSPITAL ENCOUNTER (OUTPATIENT)
Dept: VASCULAR LAB | Age: 79
Discharge: HOME | End: 2019-06-11
Payer: MEDICARE

## 2019-06-11 VITALS
SYSTOLIC BLOOD PRESSURE: 120 MMHG | BODY MASS INDEX: 25.69 KG/M2 | DIASTOLIC BLOOD PRESSURE: 80 MMHG | WEIGHT: 145 LBS | HEIGHT: 63 IN | HEART RATE: 70 BPM

## 2019-06-11 DIAGNOSIS — I10 ESSENTIAL HYPERTENSION, BENIGN: Chronic | ICD-10-CM

## 2019-06-11 DIAGNOSIS — I73.9 CLAUDICATION OF BOTH LOWER EXTREMITIES (HCC): ICD-10-CM

## 2019-06-11 DIAGNOSIS — I48.0 PAROXYSMAL ATRIAL FIBRILLATION (HCC): Chronic | ICD-10-CM

## 2019-06-11 DIAGNOSIS — M15.9 PRIMARY OSTEOARTHRITIS INVOLVING MULTIPLE JOINTS: ICD-10-CM

## 2019-06-11 DIAGNOSIS — E78.2 MIXED HYPERLIPIDEMIA: Chronic | ICD-10-CM

## 2019-06-11 DIAGNOSIS — M79.605 BILATERAL LEG PAIN: ICD-10-CM

## 2019-06-11 DIAGNOSIS — M79.604 BILATERAL LEG PAIN: ICD-10-CM

## 2019-06-11 DIAGNOSIS — Z95.0 PACEMAKER: Primary | ICD-10-CM

## 2019-06-11 PROCEDURE — 4040F PNEUMOC VAC/ADMIN/RCVD: CPT | Performed by: NURSE PRACTITIONER

## 2019-06-11 PROCEDURE — G8427 DOCREV CUR MEDS BY ELIG CLIN: HCPCS | Performed by: NURSE PRACTITIONER

## 2019-06-11 PROCEDURE — 93922 UPR/L XTREMITY ART 2 LEVELS: CPT

## 2019-06-11 PROCEDURE — 1111F DSCHRG MED/CURRENT MED MERGE: CPT | Performed by: NURSE PRACTITIONER

## 2019-06-11 PROCEDURE — G8399 PT W/DXA RESULTS DOCUMENT: HCPCS | Performed by: NURSE PRACTITIONER

## 2019-06-11 PROCEDURE — 1090F PRES/ABSN URINE INCON ASSESS: CPT | Performed by: NURSE PRACTITIONER

## 2019-06-11 PROCEDURE — G8417 CALC BMI ABV UP PARAM F/U: HCPCS | Performed by: NURSE PRACTITIONER

## 2019-06-11 PROCEDURE — G8598 ASA/ANTIPLAT THER USED: HCPCS | Performed by: NURSE PRACTITIONER

## 2019-06-11 PROCEDURE — 1036F TOBACCO NON-USER: CPT | Performed by: NURSE PRACTITIONER

## 2019-06-11 PROCEDURE — 1123F ACP DISCUSS/DSCN MKR DOCD: CPT | Performed by: NURSE PRACTITIONER

## 2019-06-11 PROCEDURE — 99214 OFFICE O/P EST MOD 30 MIN: CPT | Performed by: NURSE PRACTITIONER

## 2019-06-11 NOTE — LETTER
 amLODIPine (NORVASC) 10 MG tablet Take 1 tablet by mouth daily 90 tablet 0    topiramate (TOPAMAX) 100 MG tablet Take 1 tablet by mouth 2 times daily 180 tablet 3    clopidogrel (PLAVIX) 75 MG tablet Take 1 tablet by mouth daily 90 tablet 2    meclizine (ANTIVERT) 12.5 MG tablet Take 12.5 mg by mouth 3 times daily as needed for Dizziness or Nausea      allopurinol (ZYLOPRIM) 100 MG tablet Take 1 tablet by mouth daily Take along with 300mg tablet for total of 400mg. 90 tablet 3    tiZANidine (ZANAFLEX) 4 MG tablet Take 1 tablet by mouth every 6 hours as needed (prn) 100 tablet 0    zoledronic acid (RECLAST) 5 MG/100ML SOLN Infuse 5 mg intravenously once IV ,yearly      vitamin D (ERGOCALCIFEROL) 49803 units CAPS capsule Take 1 capsule by mouth once a week 12 capsule 1    nitroGLYCERIN (NITROLINGUAL) 0.4 MG/SPRAY 0.4 mg spray Place 1 spray under the tongue every 5 minutes as needed for Chest pain 1 Bottle 3    metoprolol tartrate (LOPRESSOR) 100 MG tablet Take 1 tablet by mouth 2 times daily 180 tablet 3     No current facility-administered medications for this visit. REVIEW OF SYSTEMS:   CONSTITUTIONAL: No major weight gain or loss, fatigue, weakness, night sweats or fever. There's been no change in energy level, sleep pattern, or activity level. HEENT: No new vision difficulties or ringing in the ears. RESPIRATORY: No new  PND, orthopnea or cough. SOB improved  CARDIOVASCULAR: See HPI  GI: No nausea, vomiting, diarrhea, constipation, abdominal pain or changes in bowel habits. : No urinary frequency, urgency, incontinence hematuria or dysuria. SKIN: No cyanosis or skin lesions. MUSCULOSKELETAL: No new muscle or joint pain. NEUROLOGICAL: No syncope or TIA-like symptoms.   PSYCHIATRIC: No anxiety, pain, insomnia or depression    Objective:   PHYSICAL EXAM:        VITALS:    Vitals:    06/11/19 1004   BP: 120/80   Pulse: 70 CONSTITUTIONAL: Cooperative, no apparent distress, and appears well nourished / developed  NEUROLOGIC:  Awake and orientated to person, place and time. PSYCH: Calm affect. SKIN: Warm and dry. HEENT: Sclera non-icteric, normocephalic, neck supple, no elevation of JVP, normal carotid pulses with no bruits and thyroid normal size. LUNGS:  No increased work of breathing and clear to auscultation, no crackles or wheezing. CARDIOVASCULAR:  Regular rate and irregular rhythm with no murmurs, gallops, rubs, or abnormal heart sounds, normal PMI. The apical impulses not displaced. Heart tones are crisp and normal. Cervical veins are not engorged                 JVP less than 8 cm H2O                                                  The carotid upstroke is normal in amplitude and contour without delay or bruit    ABDOMEN:  Normal bowel sounds, non-distended and non-tender to palpation   EXT: No edema, no calf tenderness. Pulses are present bilaterally.    DATA:    Lab Results   Component Value Date    ALT 15 04/24/2019    AST 16 04/24/2019    ALKPHOS 95 04/24/2019    BILITOT 0.3 04/24/2019     Lab Results   Component Value Date    CREATININE 1.3 (H) 05/20/2019    BUN 24 (H) 05/20/2019     05/20/2019    K 4.1 05/20/2019     05/20/2019    CO2 21 05/20/2019     Lab Results   Component Value Date    TSH 1.37 10/03/2018    F8VYFCC 7.3 10/03/2018     Lab Results   Component Value Date    WBC 10.3 05/18/2019    HGB 13.6 05/18/2019    HCT 42.9 05/18/2019    MCV 82.1 05/18/2019     05/18/2019     No components found for: CHLPL  Lab Results   Component Value Date    TRIG 272 (H) 05/18/2019    TRIG 227 (A) 03/19/2018    TRIG 240 (H) 06/02/2016     Lab Results   Component Value Date    HDL 28 (L) 05/18/2019    HDL 28 (A) 03/19/2018    HDL 30 (L) 06/02/2016     Lab Results   Component Value Date    LDLCALC 75 05/18/2019    LDLCALC 89 03/19/2018    LDLCALC 75 06/02/2016     Lab Results   Component Value Date LABVLDL 54 05/18/2019    LABVLDL 45 (A) 03/19/2018    LABVLDL 48 06/02/2016     Cardiac angiogram:   6/19  NSTEMI: treated with heparin drip. Cardiology consulted. Angiogram findings were as below:      Findings:                      LM       Normal              LAD     50% ostial, mid 100%              Cx        40% OM1 at bifurcation              RCA     Mid 100%              LVG     Not performed              EDP     15 mmHg              L-LAD  Patent              S-PDA Known occluded  Severe Ca++:None  Post Cath Dx:Stable CAD, no apparent culprit for NSTEMI  Intervention:  None  Med Rec:        Continue aggressive med tx                          Continue plavix, no asa due to allergy. statin added. LDL 75      HTN: controlled. H/o CKD stage 3- stable. Okayed by cardiology for discharge on the same day of angiogram procedure. 8/7/18  The PCI of complex proximal RCA chronic total occlusion was unsuccessful (J- score 3). Underwent successful Angioplasty of high-grade proximal RCA lesion proximal to the . RECOMMENDATIONS:  Attempt on this complex long  was unsuccessful. Lack   Of retrograde collaterals along with a very ambiguous cap as well as a large RV marginal branch and bridging collateral coming off at the cap makes it a  challenging repeat attempt. If she continues to have angina, it is recommended to proceed with the single-vessel SVG to the RCA. Echo: 2/17/16 ([facility])  Conclusions: Normal LV size and systolic function Mild to moderate mitral  regurgitation Mild tricuspid regurgitation  Findings:   Left [facility]: Mild to moderate enlargement of the left atrium. Left Ventricle: Upper limits of normal left ventricle size. No left ventricular  hypertrophy. Normal left ventricular systolic function. The ejection fraction   Is visually estimated to be 55 %. Right [facility]: Normal right atrial size. RightVentricle: Normal right ventricle size. Normal right ventricular function. Aortic Valve: Tri-leaflet aortic valve. Mild aortic cusp calcification. No  aortic valve stenosis. Mild (1+) aortic valve regurgitation. Aorta: No dilatation of the aortic root. IVC/PA: Normal IVC size and normal respiratory  collapse consistent with normal right atrial pressure. Mitral Valve: Mild mitral valve leaflet calcification. Mild to moderate (2+) mitral valve  regurgitation. No mitral valve stenosis. Tricuspid Valve: Mild (1+) tricuspid  regurgitation. The pulmonary artery pressure is normal.   Pulmonic Valve: Mild  pulmonic regurgitation. Pericardium: Normal pericardium with no significant  pericardial effusion.     Nuc stress 6/28/16: ([facility])   Final Conclusions/Summary  Stress ECG Impressions: No significant ST changes during vasodilator infusion  Summary: - Abnormal moderat risk stress test with moderate size and severity  anterolateral wall ischemia - Normal LV size and systolic function    Assessment:     Problem: Coronary artery disease  Remote CABG  7/2008 Cath: Patent LIMA to LAD, occluded SVG to RCA with collaterals. Helena Granado will continue with her current regime including diet and exercise. .   2/3/17 Cath: which showed 100% prox RCA with bridging collateral, 100% SVG to RCA, 100% prox LAD after D1, D1 stent widely patent, Mild Cx disease, Normal LV FXN--EF 60%--EDP 12 post hydration.      8/7/18:        The PCI of complex proximal RCA chronic total occlusion was unsuccessful (J- score 3). Underwent successful Angioplasty of high-grade proximal RCA lesion proximal to the . RECOMMENDATIONS:  Attempt on this complex long  was unsuccessful. Lack  Of retrograde collaterals along with a very ambiguous cap as well as a large RV marginal branch and bridging collateral coming off at the cap makes it a  challenging repeat attempt. If she continues to have angina, it is recommended to proceed with the single-vessel SVG to the RCA.     Patient denies any anginal symptoms  5/19 NSTEMI: treated with heparin drip. Cardiology consulted. Angiogram findings were as below:      Findings:                      LM       Normal              LAD     50% ostial, mid 100%              Cx        40% OM1 at bifurcation              RCA     Mid 100%              LVG     Not performed              EDP     15 mmHg              L-LAD  Patent              S-PDA Known occluded  Severe Ca++:None  Post Cath Dx:Stable CAD, no apparent culprit for NSTEMI  Intervention:  None  Med Rec:        Continue aggressive med tx                          Continue plavix, no asa due to allergy. statin added. LDL 75      HTN: controlled. H/o CKD stage 3- stable. Okayed by cardiology for discharge on the same day of angiogram procedure. Problem: Hypertension: Stable. Continue current medications:     Problem: Atrial fibrillation/ Atrial Flutter   - Paroxysmal atrial flutter                        - Hx of cardioversion in 2008                        -s/o RFCA for A fib at [facility] by Dr. Sharita Douglas in 2009                         - S/p DCCV on 8/2/17 by Dr. Carri Desouza                        - Currently in sinus/ paced rhythm       Currently on Xarelto 15 mg daily due to creat clearance 48     Problem: Hyperlipidemia    5/19  TC-157, Trig-272, HDL-28, LDL-75  3/19/18: HDL: 28, LDL: 89  Followed per PCP     PFT showed severe obstructive disease,    Followed per DR. Rosa Espino of  CHF      SOB improved, denies any nocturnal dyspnea       Lungs clear negative for crackles/ weight is at baseline     Plan: continue Demadex, Losartan, and Metoprolol  Plan:   Continue current medications    Leg Pain:  Neuropathy followed by DR. Hayden  6/19  Comments   Right   Right MAXI: 0.75. This is consistent with mild arterial insufficiency at rest.   Right first toe/brachial index 0.57: This is mildly abnormal.   Left   Left MAXI: 0.78.  This is consistent with mild arterial insufficiency at rest. Left first toe/brachial index 0.61: This is within normal range . The patient  Currently is not smoking. The risks related to smoking were reviewed with the patient. Recommend maintaining a smoke-free lifestyle. Products available for smoking cessation were discussed. PLAN:  No changes in meds  OV 4 months      DELFIN Tapia CNP  [employer]  . If you have questions, please do not hesitate to call me. I look forward to following Papa Lal along with you.     Sincerely,        TIFFANIE Mccray - CNP    CC providers:  Debra Zuniga MD  [address] [place] [address]

## 2019-06-11 NOTE — PROGRESS NOTES
[address]     Outpatient Follow Up Note    CHIEF COMPLAINT / HPI:  Hospital  Follow Up secondary to CAD/ HTN / AFib/ and hyperlipidemia      66 y. o. female with PMHx of atrial fibrillation, CAD, CABG, TIA, chronic kidney disease, congestive heart failure, hypertension  presented to [address] [place] [address] 88 hour history of chest pain. Patient reports that her chest pain started around 2:00 this afternoon. She tried taking her own nitroglycerin which did not help. She does describe the pain as a 6 out of 10 at its worse. It radiated across the entire chest into both arms. She denies any nausea vomiting fever diarrhea denies any other ill contacts. She then called 911. In route she received nitroglycerin which did relieve her pain. And she is currently pain-free. She had cath-no intervention. SInce home she has not had any issues with her heart but major issue is with neuropathy in legs with severe pain. She has  significant coronary disease with multiple interventions in the past.     She had a DONA in July which was stable. She had PFTs which showed severe obstructive disease, she thinks she did get little better with inhaler barby we had given her. Since October 2016  she has had SSCP, was started on Ranexa and has increased it to 1000 mg twice daily and which she continued to have a dull chest pain. On 2/2/17 she had a LHC which showed stable coronary findings. On 8/7/18 patient underwent a unsuccessful  of the RCA. Plan : continue current medications, and if angina persist will PCI SVG to RCA. She has difficulty due to pain in her legs.           Past Medical History:   Diagnosis Date    Allergic rhinitis, cause unspecified     Arthritis     Atrial fibrillation ([address]     Bronchopneumonia     CAD (coronary artery disease)     stent:  post cataract surgery (CABG)    Cerebral artery occlusion with cerebral infarction (HCC)     TIA    Chronic gouty arthropathy     Chronic kidney disease     Congestive heart failure, unspecified     Degeneration of cervical intervertebral disc     Essential and other specified forms of tremor     Gout     HIGH CHOLESTEROL     History of CVA (cerebrovascular accident)     Hypertension     Influenza 2017    Mitral valve stenosis and aortic valve insufficiency     Movement disorder     back problems    Pacemaker     Peptic ulcer, unspecified site, unspecified as acute or chronic, without mention of hemorrhage, perforation, or obstruction     Thyroid disease     Unspecified disorder of kidney and ureter     Unspecified hypothyroidism      Social History:    Social History     Tobacco Use   Smoking Status Former Smoker    Packs/day: 1.00    Years: 28.00    Pack years: 28.00    Types: Cigarettes    Last attempt to quit: 1987    Years since quittin.4   Smokeless Tobacco Never Used   Tobacco Comment    H.O.smoking at age 15 / smoked up to 1 p.p.d / quit      Current Medications:  Current Outpatient Medications   Medication Sig Dispense Refill    rosuvastatin (CRESTOR) 5 MG tablet Take 1 tablet by mouth nightly 90 tablet 3    gabapentin (NEURONTIN) 100 MG capsule Take one capsule in the morning and 2 capsules at night I'm 90 capsule 2    XARELTO 15 MG TABS tablet TAKE 1 TABLET DAILY WITH BREAKFAST 90 tablet 3    allopurinol (ZYLOPRIM) 300 MG tablet Take 1 tablet by mouth daily 90 tablet 1    levothyroxine (SYNTHROID) 112 MCG tablet TAKE 1 TABLET DAILY 90 tablet 1    torsemide (DEMADEX) 20 MG tablet 1-2 qd 60 tablet 5    losartan (COZAAR) 25 MG tablet Take 1 tablet by mouth daily 90 tablet 1    amLODIPine (NORVASC) 10 MG tablet Take 1 tablet by mouth daily 90 tablet 0    topiramate (TOPAMAX) 100 MG tablet Take 1 tablet by mouth 2 times daily 180 tablet 3    clopidogrel (PLAVIX) 75 MG tablet Take 1 tablet by mouth daily 90 tablet 2    meclizine (ANTIVERT) 12.5 MG tablet Take 12.5 mg by mouth 3 times daily as needed for Dizziness or Nausea  allopurinol (ZYLOPRIM) 100 MG tablet Take 1 tablet by mouth daily Take along with 300mg tablet for total of 400mg. 90 tablet 3    tiZANidine (ZANAFLEX) 4 MG tablet Take 1 tablet by mouth every 6 hours as needed (prn) 100 tablet 0    zoledronic acid (RECLAST) 5 MG/100ML SOLN Infuse 5 mg intravenously once IV ,yearly      vitamin D (ERGOCALCIFEROL) 55613 units CAPS capsule Take 1 capsule by mouth once a week 12 capsule 1    nitroGLYCERIN (NITROLINGUAL) 0.4 MG/SPRAY 0.4 mg spray Place 1 spray under the tongue every 5 minutes as needed for Chest pain 1 Bottle 3    metoprolol tartrate (LOPRESSOR) 100 MG tablet Take 1 tablet by mouth 2 times daily 180 tablet 3     No current facility-administered medications for this visit. REVIEW OF SYSTEMS:   CONSTITUTIONAL: No major weight gain or loss, fatigue, weakness, night sweats or fever. There's been no change in energy level, sleep pattern, or activity level. HEENT: No new vision difficulties or ringing in the ears. RESPIRATORY: No new  PND, orthopnea or cough. SOB improved  CARDIOVASCULAR: See HPI  GI: No nausea, vomiting, diarrhea, constipation, abdominal pain or changes in bowel habits. : No urinary frequency, urgency, incontinence hematuria or dysuria. SKIN: No cyanosis or skin lesions. MUSCULOSKELETAL: No new muscle or joint pain. NEUROLOGICAL: No syncope or TIA-like symptoms. PSYCHIATRIC: No anxiety, pain, insomnia or depression    Objective:   PHYSICAL EXAM:        VITALS:    Vitals:    06/11/19 1004   BP: 120/80   Pulse: 70               CONSTITUTIONAL: Cooperative, no apparent distress, and appears well nourished / developed  NEUROLOGIC:  Awake and orientated to person, place and time. PSYCH: Calm affect. SKIN: Warm and dry. HEENT: Sclera non-icteric, normocephalic, neck supple, no elevation of JVP, normal carotid pulses with no bruits and thyroid normal size.   LUNGS:  No increased work of breathing and clear to auscultation, no crackles or wheezing. CARDIOVASCULAR:  Regular rate and irregular rhythm with no murmurs, gallops, rubs, or abnormal heart sounds, normal PMI. The apical impulses not displaced. Heart tones are crisp and normal. Cervical veins are not engorged                 JVP less than 8 cm H2O                                                  The carotid upstroke is normal in amplitude and contour without delay or bruit    ABDOMEN:  Normal bowel sounds, non-distended and non-tender to palpation   EXT: No edema, no calf tenderness. Pulses are present bilaterally. DATA:    Lab Results   Component Value Date    ALT 15 04/24/2019    AST 16 04/24/2019    ALKPHOS 95 04/24/2019    BILITOT 0.3 04/24/2019     Lab Results   Component Value Date    CREATININE 1.3 (H) 05/20/2019    BUN 24 (H) 05/20/2019     05/20/2019    K 4.1 05/20/2019     05/20/2019    CO2 21 05/20/2019     Lab Results   Component Value Date    TSH 1.37 10/03/2018    G3MMZCM 7.3 10/03/2018     Lab Results   Component Value Date    WBC 10.3 05/18/2019    HGB 13.6 05/18/2019    HCT 42.9 05/18/2019    MCV 82.1 05/18/2019     05/18/2019     No components found for: CHLPL  Lab Results   Component Value Date    TRIG 272 (H) 05/18/2019    TRIG 227 (A) 03/19/2018    TRIG 240 (H) 06/02/2016     Lab Results   Component Value Date    HDL 28 (L) 05/18/2019    HDL 28 (A) 03/19/2018    HDL 30 (L) 06/02/2016     Lab Results   Component Value Date    LDLCALC 75 05/18/2019    LDLCALC 89 03/19/2018    LDLCALC 75 06/02/2016     Lab Results   Component Value Date    LABVLDL 54 05/18/2019    LABVLDL 45 (A) 03/19/2018    LABVLDL 48 06/02/2016     Cardiac angiogram:   6/19  NSTEMI: treated with heparin drip. Cardiology consulted.  Angiogram findings were as below:      Findings:                      LM       Normal              LAD     50% ostial, mid 100%              Cx        40% OM1 at bifurcation              RCA     Mid 100%              LVG     Not performed              EDP 15 mmHg              L-LAD  Patent              S-PDA Known occluded  Severe Ca++:None  Post Cath Dx:Stable CAD, no apparent culprit for NSTEMI  Intervention:  None  Med Rec:        Continue aggressive med tx                          Continue plavix, no asa due to allergy. statin added. LDL 75      HTN: controlled. H/o CKD stage 3- stable. Okayed by cardiology for discharge on the same day of angiogram procedure. 8/7/18  The PCI of complex proximal RCA chronic total occlusion was unsuccessful (J- score 3). Underwent successful Angioplasty of high-grade proximal RCA lesion proximal to the . RECOMMENDATIONS:  Attempt on this complex long  was unsuccessful. Lack   Of retrograde collaterals along with a very ambiguous cap as well as a large RV marginal branch and bridging collateral coming off at the cap makes it a  challenging repeat attempt. If she continues to have angina, it is recommended to proceed with the single-vessel SVG to the RCA. Echo: 2/17/16 ([facility])  Conclusions: Normal LV size and systolic function Mild to moderate mitral  regurgitation Mild tricuspid regurgitation  Findings:   Left [facility]: Mild to moderate enlargement of the left atrium. Left Ventricle: Upper limits of normal left ventricle size. No left ventricular  hypertrophy. Normal left ventricular systolic function. The ejection fraction   Is visually estimated to be 55 %. Right [facility]: Normal right atrial size. RightVentricle: Normal right ventricle size. Normal right ventricular function. Aortic Valve: Tri-leaflet aortic valve. Mild aortic cusp calcification. No  aortic valve stenosis. Mild (1+) aortic valve regurgitation. Aorta: No dilatation of the aortic root. IVC/PA: Normal IVC size and normal respiratory  collapse consistent with normal right atrial pressure. Mitral Valve: Mild mitral valve leaflet calcification. Mild to moderate (2+) mitral valve  regurgitation. No mitral valve stenosis. Tricuspid Valve: Mild (1+) tricuspid  regurgitation. The pulmonary artery pressure is normal.   Pulmonic Valve: Mild  pulmonic regurgitation. Pericardium: Normal pericardium with no significant  pericardial effusion.     Nuc stress 6/28/16: ([facility])   Final Conclusions/Summary  Stress ECG Impressions: No significant ST changes during vasodilator infusion  Summary: - Abnormal moderat risk stress test with moderate size and severity  anterolateral wall ischemia - Normal LV size and systolic function    Assessment:     Problem: Coronary artery disease  Remote CABG  7/2008 Cath: Patent LIMA to LAD, occluded SVG to RCA with collaterals. Zully Hazel will continue with her current regime including diet and exercise. .   2/3/17 Cath: which showed 100% prox RCA with bridging collateral, 100% SVG to RCA, 100% prox LAD after D1, D1 stent widely patent, Mild Cx disease, Normal LV FXN--EF 60%--EDP 12 post hydration.      8/7/18:        The PCI of complex proximal RCA chronic total occlusion was unsuccessful (J- score 3). Underwent successful Angioplasty of high-grade proximal RCA lesion proximal to the . RECOMMENDATIONS:  Attempt on this complex long  was unsuccessful. Lack  Of retrograde collaterals along with a very ambiguous cap as well as a large RV marginal branch and bridging collateral coming off at the cap makes it a  challenging repeat attempt. If she continues to have angina, it is recommended to proceed with the single-vessel SVG to the RCA.     Patient denies any anginal symptoms  5/19   NSTEMI: treated with heparin drip. Cardiology consulted.  Angiogram findings were as below:      Findings:                      LM       Normal              LAD     50% ostial, mid 100%              Cx        40% OM1 at bifurcation              RCA     Mid 100%              LVG     Not performed              EDP     15 mmHg              L-LAD  Patent              S-PDA Known occluded  Severe Ca++:None  Post Cath Dx:Stable CAD,

## 2019-06-12 RX ORDER — HYDROCODONE BITARTRATE AND ACETAMINOPHEN 5; 325 MG/1; MG/1
1 TABLET ORAL EVERY 4 HOURS PRN
Qty: 120 TABLET | Refills: 0 | Status: SHIPPED | OUTPATIENT
Start: 2019-06-12 | End: 2019-06-18 | Stop reason: SDUPTHER

## 2019-06-12 NOTE — TELEPHONE ENCOUNTER
Medication:   Requested Prescriptions     Pending Prescriptions Disp Refills    HYDROcodone-acetaminophen (NORCO) 5-325 MG per tablet 120 tablet 0     Sig: Take 1 tablet by mouth every 4 hours as needed for Pain for up to 30 days. Last Filled:  4/18/2019    Patient Phone Number: [phone] (home)     Last appt: 6/5/2019   Next appt: 7/22/2019    Last OARRS:   RX Monitoring 4/18/2019   Attestation The Prescription Monitoring Report for this patient was reviewed today.        Preferred Pharmacy:   [facility] [place] [address], [address] [facility] [phone] - F [phone]  [facility] [address]  Phone: [phone] Fax: [phone]

## 2019-06-13 ENCOUNTER — TELEPHONE (OUTPATIENT)
Dept: NEUROLOGY | Age: 79
End: 2019-06-13

## 2019-06-13 NOTE — TELEPHONE ENCOUNTER
Patient called to get doppler results. Per Dr. Espinoza Shahid, some mild circulation problems, refer to Dr. Toma Treviño.

## 2019-06-18 DIAGNOSIS — M15.9 PRIMARY OSTEOARTHRITIS INVOLVING MULTIPLE JOINTS: ICD-10-CM

## 2019-06-18 RX ORDER — HYDROCODONE BITARTRATE AND ACETAMINOPHEN 5; 325 MG/1; MG/1
1 TABLET ORAL EVERY 4 HOURS PRN
Qty: 120 TABLET | Refills: 0 | Status: SHIPPED | OUTPATIENT
Start: 2019-06-18 | End: 2019-06-20 | Stop reason: SDUPTHER

## 2019-06-18 NOTE — TELEPHONE ENCOUNTER
Can this be send to pt's local pharmacy? Rx sent to mail order and will not be sent to her till the 28th. Pt has no idea why this is and will like to have her mail order and send new Rx to her local pharmacy        Medication:   Requested Prescriptions     Pending Prescriptions Disp Refills    HYDROcodone-acetaminophen (NORCO) 5-325 MG per tablet 120 tablet 0     Sig: Take 1 tablet by mouth every 4 hours as needed for Pain for up to 30 days. Last Filled:  04/18/19    Patient Phone Number: [phone] (home)     Last appt: 6/5/2019   Next appt: 7/22/2019    Last OARRS:   RX Monitoring 4/18/2019   Attestation The Prescription Monitoring Report for this patient was reviewed today.        Preferred Pharmacy:   [place] [address] [phone] [place] [phone]  [facility] [address]  Phone: [phone] Fax: [phone]

## 2019-06-20 DIAGNOSIS — M15.9 PRIMARY OSTEOARTHRITIS INVOLVING MULTIPLE JOINTS: ICD-10-CM

## 2019-06-20 NOTE — TELEPHONE ENCOUNTER
Can you please send 2 days supply to her local pharmacy? We sent original Rx to mail order and they wouldn't send med till the 28th. Nohelia Paz called and med was canceled but they still sent med nad pt will received me but not till this coming Saturday. Rx sent to local pharmacy on the 18 was cancelled as of today. Waiting on Rx for 2 days supply to fill.   Pt notified

## 2019-06-21 DIAGNOSIS — M15.9 PRIMARY OSTEOARTHRITIS INVOLVING MULTIPLE JOINTS: ICD-10-CM

## 2019-06-21 RX ORDER — HYDROCODONE BITARTRATE AND ACETAMINOPHEN 5; 325 MG/1; MG/1
1 TABLET ORAL EVERY 4 HOURS PRN
Qty: 12 TABLET | Refills: 0 | Status: SHIPPED | OUTPATIENT
Start: 2019-06-21 | End: 2019-06-23

## 2019-06-21 RX ORDER — HYDROCODONE BITARTRATE AND ACETAMINOPHEN 5; 325 MG/1; MG/1
1 TABLET ORAL EVERY 4 HOURS PRN
Qty: 12 TABLET | Refills: 0 | Status: SHIPPED | OUTPATIENT
Start: 2019-06-21 | End: 2019-06-21 | Stop reason: SDUPTHER

## 2019-07-02 ENCOUNTER — TELEPHONE (OUTPATIENT)
Dept: NEUROLOGY | Age: 79
End: 2019-07-02

## 2019-07-03 ENCOUNTER — TELEPHONE (OUTPATIENT)
Dept: NEUROLOGY | Age: 79
End: 2019-07-03

## 2019-07-09 ENCOUNTER — OFFICE VISIT (OUTPATIENT)
Dept: VASCULAR SURGERY | Age: 79
End: 2019-07-09
Payer: MEDICARE

## 2019-07-09 VITALS
BODY MASS INDEX: 25.87 KG/M2 | WEIGHT: 146 LBS | DIASTOLIC BLOOD PRESSURE: 68 MMHG | SYSTOLIC BLOOD PRESSURE: 130 MMHG | HEIGHT: 63 IN

## 2019-07-09 DIAGNOSIS — I70.213 ATHEROSCLEROSIS OF NATIVE ARTERIES OF EXTREMITIES WITH INTERMITTENT CLAUDICATION, BILATERAL LEGS (HCC): ICD-10-CM

## 2019-07-09 DIAGNOSIS — I70.213 ATHEROSCLEROSIS OF NATIVE ARTERIES OF EXTREMITIES WITH INTERMITTENT CLAUDICATION, BILATERAL LEGS (HCC): Primary | ICD-10-CM

## 2019-07-09 LAB
ANION GAP SERPL CALCULATED.3IONS-SCNC: 15 MMOL/L (ref 3–16)
BUN BLDV-MCNC: 27 MG/DL (ref 7–20)
CALCIUM SERPL-MCNC: 9.5 MG/DL (ref 8.3–10.6)
CHLORIDE BLD-SCNC: 109 MMOL/L (ref 99–110)
CO2: 20 MMOL/L (ref 21–32)
CREAT SERPL-MCNC: 1.4 MG/DL (ref 0.6–1.2)
GFR AFRICAN AMERICAN: 44
GFR NON-AFRICAN AMERICAN: 36
GLUCOSE BLD-MCNC: 95 MG/DL (ref 70–99)
HCT VFR BLD CALC: 43.7 % (ref 36–48)
HEMOGLOBIN: 13.9 G/DL (ref 12–16)
INR BLD: 2.15 (ref 0.86–1.14)
MCH RBC QN AUTO: 26.2 PG (ref 26–34)
MCHC RBC AUTO-ENTMCNC: 31.9 G/DL (ref 31–36)
MCV RBC AUTO: 82.3 FL (ref 80–100)
PDW BLD-RTO: 19 % (ref 12.4–15.4)
PLATELET # BLD: 324 K/UL (ref 135–450)
PMV BLD AUTO: 9 FL (ref 5–10.5)
POTASSIUM SERPL-SCNC: 4.6 MMOL/L (ref 3.5–5.1)
PROTHROMBIN TIME: 24.5 SEC (ref 9.8–13)
RBC # BLD: 5.31 M/UL (ref 4–5.2)
SODIUM BLD-SCNC: 144 MMOL/L (ref 136–145)
WBC # BLD: 9.9 K/UL (ref 4–11)

## 2019-07-09 PROCEDURE — 1036F TOBACCO NON-USER: CPT | Performed by: SURGERY

## 2019-07-09 PROCEDURE — 99203 OFFICE O/P NEW LOW 30 MIN: CPT | Performed by: SURGERY

## 2019-07-09 PROCEDURE — 1090F PRES/ABSN URINE INCON ASSESS: CPT | Performed by: SURGERY

## 2019-07-09 PROCEDURE — 1123F ACP DISCUSS/DSCN MKR DOCD: CPT | Performed by: SURGERY

## 2019-07-09 PROCEDURE — G8417 CALC BMI ABV UP PARAM F/U: HCPCS | Performed by: SURGERY

## 2019-07-09 PROCEDURE — G8427 DOCREV CUR MEDS BY ELIG CLIN: HCPCS | Performed by: SURGERY

## 2019-07-09 PROCEDURE — G8598 ASA/ANTIPLAT THER USED: HCPCS | Performed by: SURGERY

## 2019-07-09 PROCEDURE — G8399 PT W/DXA RESULTS DOCUMENT: HCPCS | Performed by: SURGERY

## 2019-07-09 PROCEDURE — 4040F PNEUMOC VAC/ADMIN/RCVD: CPT | Performed by: SURGERY

## 2019-07-09 NOTE — Clinical Note
Brigitte Rubalcava thanks for the referral.  Alhaji Benavides she was complaining of marked increased shortness of breath and feels uncomfortable. She is not sure if its related to her Neurontin. I instructed her to contact your office.   She does need a peripheral angiography at some point in the near future and we will hold her Xarelto for 48 hours prior if that is okay with Alexmarci

## 2019-07-09 NOTE — PROGRESS NOTES
HDL 31 06/06/2012    LDLCALC 75 05/18/2019    TRIG 272 05/18/2019          Diagnosis:  Patient Active Problem List   Diagnosis    Paroxysmal atrial fibrillation (HCC)    Essential hypertension, benign    Mixed hyperlipidemia    Chronic gouty arthropathy    Hypertension    Acquired hypothyroidism    Arthritis    Heart valve problem    Restrictive lung disease    Sick sinus syndrome (HCC)    Allergic rhinitis    Fibrocystic breast    [facility]brovascular accident ([facility]A) ([facility] [address] [facility]    Pacemaker    Typical atrial flutter (HCC)    Primary osteoarthritis involving multiple joints    Vitamin D deficiency    Tremor    Chronic total occlusion of native coronary artery    Age related osteoporosis    Chronic systolic congestive heart failure (HCC)    NSTEMI (non-ST elevated myocardial infarction) ([facility] [address]         Assessment:     There are no diagnoses linked to this encounter. Recommendations/Plan:  1. Atherosclerosis of native arteries of extremities with intermittent claudication, bilateral legs [facility])  70-year-old female with bilateral lower extremity claudication. It is worse on the left than it is on the right. This is been going on since last October. She also has an underlying neuropathy that may or may not be related to underlying arterial insufficiency. Her noninvasive studies would suggest moderate arterial insufficiency of both lower extremities. She would like to move forward with peripheral angiography at this point. All risks of the procedure were discussed in detail. She will need to hold her Xarelto for 48 hours prior.  - Basic Metabolic Panel; Future  - CBC; Future  - Protime-INR; Future            Shankar Bearden M.D., FACS.  7/9/2019  8:27 AM

## 2019-07-11 RX ORDER — CELECOXIB 200 MG/1
200 CAPSULE ORAL DAILY
Qty: 30 CAPSULE | Refills: 2 | Status: SHIPPED | OUTPATIENT
Start: 2019-07-11 | End: 2019-07-11 | Stop reason: SDUPTHER

## 2019-07-11 RX ORDER — CELECOXIB 200 MG/1
200 CAPSULE ORAL DAILY
Qty: 30 CAPSULE | Refills: 2 | Status: SHIPPED | OUTPATIENT
Start: 2019-07-11 | End: 2019-07-22 | Stop reason: ALTCHOICE

## 2019-07-11 NOTE — TELEPHONE ENCOUNTER
Patient requesting another prescription for medication below       Medication   celecoxib (CELEBREX) 200 MG capsule [[id number]]   celecoxib (CELEBREX) 200 MG capsule [[id number]]  DISCONTINUED    [place] [address] [phone] - F [phone]    Contact patient for further info at # on file

## 2019-07-17 ENCOUNTER — OFFICE VISIT (OUTPATIENT)
Dept: CARDIOLOGY CLINIC | Age: 79
End: 2019-07-17
Payer: MEDICARE

## 2019-07-17 VITALS
BODY MASS INDEX: 26.4 KG/M2 | WEIGHT: 149 LBS | DIASTOLIC BLOOD PRESSURE: 72 MMHG | HEIGHT: 63 IN | OXYGEN SATURATION: 98 % | SYSTOLIC BLOOD PRESSURE: 170 MMHG | HEART RATE: 71 BPM

## 2019-07-17 DIAGNOSIS — I50.22 CHRONIC SYSTOLIC CONGESTIVE HEART FAILURE (HCC): ICD-10-CM

## 2019-07-17 DIAGNOSIS — R06.02 SOB (SHORTNESS OF BREATH): Primary | ICD-10-CM

## 2019-07-17 DIAGNOSIS — R53.82 CHRONIC FATIGUE: ICD-10-CM

## 2019-07-17 DIAGNOSIS — E78.2 MIXED HYPERLIPIDEMIA: Chronic | ICD-10-CM

## 2019-07-17 DIAGNOSIS — I10 ESSENTIAL HYPERTENSION: ICD-10-CM

## 2019-07-17 LAB
A/G RATIO: 0.9 (ref 1–2)
ALBUMIN SERPL-MCNC: 6.4 G/DL (ref 6.4–8.2)
ALBUMIN SERUM: 3.1 G/DL (ref 3.4–5)
ALP BLD-CCNC: 93 U/L (ref 45–117)
ALT SERPL-CCNC: 19 U/L (ref 12–78)
ANION GAP SERPL CALCULATED.3IONS-SCNC: 8 MMOL/L (ref 7–16)
AST SERPL-CCNC: 13 U/L (ref 15–37)
B-TYPE NATRIURETIC PEPTIDE: 3647 PG/ML
BILIRUBIN: 0.4 MG/DL (ref 0.2–1)
BUN BLDV-MCNC: 29 MG/DL (ref 7–18)
CALCIUM SERPL-MCNC: 8.4 MG/DL (ref 8.5–10.1)
CHLORIDE BLD-SCNC: 115 MMOL/L (ref 98–107)
CO2: 24 MMOL/L (ref 21–32)
CREATININE + EGFR PANEL: 1.5 MG/DL (ref 0.6–1.3)
GFR AFRICAN AMERICAN: 41 ML/MIN/1.73M2
GFR NON-AFRICAN AMERICAN: 34 ML/MIN/1.73M2
GLOBULIN: 3.3 G/DL (ref 2.6–4.2)
GLUCOSE: 109 MG/DL (ref 74–106)
HCT VFR BLD CALC: 40.7 % (ref 35.8–46.5)
HEMOGLOBIN: 13 G/DL (ref 12.1–15.8)
MCH RBC QN AUTO: 25.8 PG (ref 28.4–33.4)
MCHC RBC AUTO-ENTMCNC: 32 G/DL (ref 31.1–37)
MCV RBC AUTO: 80.7 FL (ref 85–99)
PDW BLD-RTO: 19 % (ref 11.7–15.2)
PLATELET # BLD: 311 K/UL (ref 154–393)
POTASSIUM SERPL-SCNC: 3.9 MMOL/L (ref 3.5–5.1)
RBC # BLD: 5.05 M/UL (ref 3.86–5.17)
SODIUM BLD-SCNC: 147 MMOL/L (ref 136–145)
TSH SERPL DL<=0.05 MIU/L-ACNC: 0.75 UIU/ML (ref 0.36–3.74)
WBC # BLD: 7.9 K/UL (ref 4–10.5)

## 2019-07-17 PROCEDURE — 99214 OFFICE O/P EST MOD 30 MIN: CPT | Performed by: NURSE PRACTITIONER

## 2019-07-17 PROCEDURE — 4040F PNEUMOC VAC/ADMIN/RCVD: CPT | Performed by: NURSE PRACTITIONER

## 2019-07-17 PROCEDURE — G8399 PT W/DXA RESULTS DOCUMENT: HCPCS | Performed by: NURSE PRACTITIONER

## 2019-07-17 PROCEDURE — G8417 CALC BMI ABV UP PARAM F/U: HCPCS | Performed by: NURSE PRACTITIONER

## 2019-07-17 PROCEDURE — 1123F ACP DISCUSS/DSCN MKR DOCD: CPT | Performed by: NURSE PRACTITIONER

## 2019-07-17 PROCEDURE — 1090F PRES/ABSN URINE INCON ASSESS: CPT | Performed by: NURSE PRACTITIONER

## 2019-07-17 PROCEDURE — G8598 ASA/ANTIPLAT THER USED: HCPCS | Performed by: NURSE PRACTITIONER

## 2019-07-17 PROCEDURE — G8427 DOCREV CUR MEDS BY ELIG CLIN: HCPCS | Performed by: NURSE PRACTITIONER

## 2019-07-17 PROCEDURE — 1036F TOBACCO NON-USER: CPT | Performed by: NURSE PRACTITIONER

## 2019-07-17 NOTE — PROGRESS NOTES
Place 1 spray under the tongue every 5 minutes as needed for Chest pain 1 Bottle 3    metoprolol tartrate (LOPRESSOR) 100 MG tablet Take 1 tablet by mouth 2 times daily 180 tablet 3    celecoxib (CELEBREX) 200 MG capsule Take 1 capsule by mouth daily (Patient not taking: Reported on 7/17/2019) 30 capsule 2    zoledronic acid (RECLAST) 5 MG/100ML SOLN Infuse 5 mg intravenously once IV ,yearly       No current facility-administered medications for this visit. REVIEW OF SYSTEMS:   CONSTITUTIONAL: No major weight gain or loss, fatigue, weakness, night sweats or fever. There's been no change in energy level, sleep pattern, or activity level. HEENT: No new vision difficulties or ringing in the ears. RESPIRATORY: No new  PND, orthopnea or cough. SOB improved  CARDIOVASCULAR: See HPI  GI: No nausea, vomiting, diarrhea, constipation, abdominal pain or changes in bowel habits. : No urinary frequency, urgency, incontinence hematuria or dysuria. SKIN: No cyanosis or skin lesions. MUSCULOSKELETAL: No new muscle or joint pain. NEUROLOGICAL: No syncope or TIA-like symptoms. PSYCHIATRIC: No anxiety, pain, insomnia or depression    Objective:   PHYSICAL EXAM:        VITALS:    Vitals:    07/17/19 0846   BP: (!) 170/72   Pulse: 71   SpO2: 98%     She did not take any of her meds this AM          CONSTITUTIONAL: Cooperative, no apparent distress, and appears well nourished / developed  NEUROLOGIC:  Awake and orientated to person, place and time. PSYCH: Calm affect. SKIN: Warm and dry. HEENT: Sclera non-icteric, normocephalic, neck supple, no elevation of JVP, normal carotid pulses with no bruits and thyroid normal size. LUNGS:  No increased work of breathing and clear to auscultation, no crackles or wheezing. CARDIOVASCULAR:  Regular rate and irregular rhythm with no murmurs, gallops, rubs, or abnormal heart sounds, normal PMI. The apical impulses not displaced.   Heart tones are crisp and normal. Cervical

## 2019-07-18 ENCOUNTER — TELEPHONE (OUTPATIENT)
Dept: CARDIOLOGY CLINIC | Age: 79
End: 2019-07-18

## 2019-07-19 ENCOUNTER — HOSPITAL ENCOUNTER (OUTPATIENT)
Dept: CARDIOLOGY | Age: 79
Discharge: HOME | End: 2019-07-19
Payer: MEDICARE

## 2019-07-19 DIAGNOSIS — R06.02 SOB (SHORTNESS OF BREATH): ICD-10-CM

## 2019-07-19 LAB
LEFT VENTRICULAR EJECTION FRACTION MODE: NORMAL
LV EF: 45 %
LV EF: 45 %
LVEF MODALITY: NORMAL

## 2019-07-19 PROCEDURE — 93306 TTE W/DOPPLER COMPLETE: CPT

## 2019-07-22 ENCOUNTER — HOSPITAL ENCOUNTER (OUTPATIENT)
Dept: CARDIAC CATH/INVASIVE PROCEDURES | Age: 79
Discharge: HOME | End: 2019-07-22
Attending: SURGERY | Admitting: SURGERY
Payer: MEDICARE

## 2019-07-22 ENCOUNTER — OFFICE VISIT (OUTPATIENT)
Dept: FAMILY MEDICINE CLINIC | Age: 79
End: 2019-07-22
Payer: MEDICARE

## 2019-07-22 VITALS
DIASTOLIC BLOOD PRESSURE: 88 MMHG | BODY MASS INDEX: 25.65 KG/M2 | HEART RATE: 67 BPM | WEIGHT: 144.8 LBS | SYSTOLIC BLOOD PRESSURE: 134 MMHG | OXYGEN SATURATION: 97 %

## 2019-07-22 VITALS — BODY MASS INDEX: 25.87 KG/M2 | HEIGHT: 63 IN | WEIGHT: 146 LBS

## 2019-07-22 DIAGNOSIS — I50.9 CONGESTIVE HEART FAILURE WITH LEFT VENTRICULAR DYSFUNCTION (HCC): ICD-10-CM

## 2019-07-22 DIAGNOSIS — I48.20 CHRONIC ATRIAL FIBRILLATION (HCC): ICD-10-CM

## 2019-07-22 DIAGNOSIS — R06.02 SOB (SHORTNESS OF BREATH): Primary | ICD-10-CM

## 2019-07-22 DIAGNOSIS — I34.0 NON-RHEUMATIC MITRAL REGURGITATION: ICD-10-CM

## 2019-07-22 DIAGNOSIS — I73.9 PAD (PERIPHERAL ARTERY DISEASE) (HCC): ICD-10-CM

## 2019-07-22 DIAGNOSIS — N18.30 CHRONIC RENAL FAILURE, STAGE 3 (MODERATE) (HCC): ICD-10-CM

## 2019-07-22 PROBLEM — I21.4 NSTEMI (NON-ST ELEVATED MYOCARDIAL INFARCTION) (HCC): Status: RESOLVED | Noted: 2019-05-17 | Resolved: 2019-07-22

## 2019-07-22 LAB
INR BLD: 1.3 (ref 0.86–1.14)
PROTHROMBIN TIME: 14.8 SEC (ref 9.8–13)

## 2019-07-22 PROCEDURE — G8427 DOCREV CUR MEDS BY ELIG CLIN: HCPCS | Performed by: FAMILY MEDICINE

## 2019-07-22 PROCEDURE — 1036F TOBACCO NON-USER: CPT | Performed by: FAMILY MEDICINE

## 2019-07-22 PROCEDURE — 1123F ACP DISCUSS/DSCN MKR DOCD: CPT | Performed by: FAMILY MEDICINE

## 2019-07-22 PROCEDURE — 85610 PROTHROMBIN TIME: CPT

## 2019-07-22 PROCEDURE — G8417 CALC BMI ABV UP PARAM F/U: HCPCS | Performed by: FAMILY MEDICINE

## 2019-07-22 PROCEDURE — G8399 PT W/DXA RESULTS DOCUMENT: HCPCS | Performed by: FAMILY MEDICINE

## 2019-07-22 PROCEDURE — 99214 OFFICE O/P EST MOD 30 MIN: CPT | Performed by: FAMILY MEDICINE

## 2019-07-22 PROCEDURE — 36415 COLL VENOUS BLD VENIPUNCTURE: CPT

## 2019-07-22 PROCEDURE — G8598 ASA/ANTIPLAT THER USED: HCPCS | Performed by: FAMILY MEDICINE

## 2019-07-22 PROCEDURE — 1090F PRES/ABSN URINE INCON ASSESS: CPT | Performed by: FAMILY MEDICINE

## 2019-07-22 PROCEDURE — 4040F PNEUMOC VAC/ADMIN/RCVD: CPT | Performed by: FAMILY MEDICINE

## 2019-07-22 PROCEDURE — 99212 OFFICE O/P EST SF 10 MIN: CPT

## 2019-07-22 RX ORDER — MECLIZINE HCL 12.5 MG/1
12.5 TABLET ORAL 3 TIMES DAILY PRN
Status: DISCONTINUED | OUTPATIENT
Start: 2019-07-22 | End: 2019-07-22 | Stop reason: HOSPADM

## 2019-07-22 RX ORDER — LISINOPRIL 5 MG/1
5 TABLET ORAL DAILY
Qty: 90 TABLET | Refills: 1 | Status: SHIPPED | OUTPATIENT
Start: 2019-07-22 | End: 2019-07-31

## 2019-07-22 NOTE — PROGRESS NOTES
Subjective:      Patient ID: Varinder Pickens is a 66 y.o. female. CC: Patient presents for re-evaluation of chronic health problems including shortness of breath, chronic atrial fibrillation, chronic renal failure and leg pain. HPI Patient presents today for a follow-up on chronic medications and medical conditions in accompaniment of daughter. . Patient had an echocardiogram done and wants see what it shows. She is having an angiogram on legs later today. Patient just had an echocardiogram performed which demonstrated decreased left ventricular ejection fraction of 45% down from 55% several years ago. Mitral valve demonstrates moderate regurgitation and other valves show mild disease. Patient does have difficulty with walking becoming short of breath. She is due to have vascular studies today of her legs as she is having increasing leg pain and claudication symptoms. Patient denies any further chest pain symptoms.     Review of Systems      Patient Active Problem List   Diagnosis    Paroxysmal atrial fibrillation (HCC)    Essential hypertension, benign    Mixed hyperlipidemia    Chronic gouty arthropathy    Hypertension    Acquired hypothyroidism    Arthritis    Heart valve problem    Restrictive lung disease    Sick sinus syndrome (HCC)    Allergic rhinitis    Fibrocystic breast    Cerebrovascular accident (CVA) ([address]    Pacemaker    Typical atrial flutter (HCC)    Primary osteoarthritis involving multiple joints    Vitamin D deficiency    Tremor    Chronic total occlusion of native coronary artery    Age related osteoporosis    Chronic systolic congestive heart failure (HCC)    NSTEMI (non-ST elevated myocardial infarction) (HCC)    SOB (shortness of breath)       Outpatient Medications Marked as Taking for the 7/22/19 encounter (Office Visit) with Tracey Luna MD   Medication Sig Dispense Refill    rosuvastatin (CRESTOR) 5 MG tablet Take 1 tablet by mouth nightly 90 tablet 3  gabapentin (NEURONTIN) 100 MG capsule Take one capsule in the morning and 2 capsules at night I'm (Patient taking differently: See Admin Instructions. Take 2 capsules at night) 90 capsule 2    XARELTO 15 MG TABS tablet TAKE 1 TABLET DAILY WITH BREAKFAST 90 tablet 3    allopurinol (ZYLOPRIM) 300 MG tablet Take 1 tablet by mouth daily 90 tablet 1    levothyroxine (SYNTHROID) 112 MCG tablet TAKE 1 TABLET DAILY 90 tablet 1    torsemide (DEMADEX) 20 MG tablet 1-2 qd (Patient taking differently: Take 40 mg by mouth daily 1-2 qd) 60 tablet 5    amLODIPine (NORVASC) 10 MG tablet Take 1 tablet by mouth daily 90 tablet 0    topiramate (TOPAMAX) 100 MG tablet Take 1 tablet by mouth 2 times daily 180 tablet 3    clopidogrel (PLAVIX) 75 MG tablet Take 1 tablet by mouth daily 90 tablet 2    meclizine (ANTIVERT) 12.5 MG tablet Take 12.5 mg by mouth 3 times daily as needed for Dizziness or Nausea      allopurinol (ZYLOPRIM) 100 MG tablet Take 1 tablet by mouth daily Take along with 300mg tablet for total of 400mg.  90 tablet 3    tiZANidine (ZANAFLEX) 4 MG tablet Take 1 tablet by mouth every 6 hours as needed (prn) 100 tablet 0    vitamin D (ERGOCALCIFEROL) 91295 units CAPS capsule Take 1 capsule by mouth once a week 12 capsule 1    nitroGLYCERIN (NITROLINGUAL) 0.4 MG/SPRAY 0.4 mg spray Place 1 spray under the tongue every 5 minutes as needed for Chest pain 1 Bottle 3    metoprolol tartrate (LOPRESSOR) 100 MG tablet Take 1 tablet by mouth 2 times daily 180 tablet 3       Allergies   Allergen Reactions    Aspirin Nausea Only    Ativan [Lorazepam] Other (See Comments)     hallucinations    Diltiazem Anaphylaxis    Sulfa Antibiotics Rash and Hives    Dabigatran Nausea Only     And indigestion  And indigestion    Aka Pradaxa    Lipitor [Atorvastatin] Other (See Comments)     Muscle pains    Mysoline [Primidone]     Nsaids     Other Other (See Comments)     Nitroglycerin patches causes severe headaches Social History     Tobacco Use    Smoking status: Former Smoker     Packs/day: 1.00     Years: 28.00     Pack years: 28.00     Types: Cigarettes     Last attempt to quit: 1987     Years since quittin.5    Smokeless tobacco: Never Used    Tobacco comment: H.O.smoking at age 15 / smoked up to 1 p.p.d / quit    Substance Use Topics    Alcohol use: Yes     Alcohol/week: 0.0 standard drinks     Comment: rare       /88 (Site: Right Upper Arm, Position: Sitting, Cuff Size: Medium Adult)   Pulse 67   Wt 144 lb 12.8 oz (65.7 kg)   SpO2 97%   BMI 25.65 kg/m²       Objective:   Physical Exam   Constitutional: She appears well-developed and well-nourished. She is cooperative. No distress. Neck: Carotid bruit is not present. Cardiovascular: Normal rate. An irregularly irregular rhythm present. Murmur heard. Systolic (Right sternal border) murmur is present with a grade of 2/6. Pulses:       Dorsalis pedis pulses are 1+ on the right side, and 1+ on the left side. Posterior tibial pulses are 1+ on the right side, and 1+ on the left side. Pulmonary/Chest: Effort normal and breath sounds normal.   Musculoskeletal: Normal range of motion. She exhibits no edema or tenderness. Neurological: She is alert. She has normal strength. No sensory deficit. Assessment:      Randi Saul was seen today for 3 month follow-up. Diagnoses and all orders for this visit:    SOB (shortness of breath)    Congestive heart failure with left ventricular dysfunction (HCC)    Non-rheumatic mitral regurgitation    Chronic atrial fibrillation (HCC)    Chronic renal failure, stage 3 (moderate) (HCC)    PAD (peripheral artery disease) [address] [address]    Other orders  -     lisinopril (PRINIVIL;ZESTRIL) 5 MG tablet;  Take 1 tablet by mouth daily    OARRS report checked          Plan:      Laboratory profile and echocardiogram reviewed with patient and daughter  With the decreased left ventricular function per echocardiogram she is already on max dose of a beta-blocker so we will go ahead and add lisinopril. Patient should maintain diuretic and we discussed maintaining a low-salt diet and avoiding anti-inflammatory medications  I agreed to go ahead and do the vascular studies of her legs  Recheck laboratory profile in 2 months and RTC    Please note that this chart was generated using Dragon dictation software. Although every effort was made to ensure the accuracy of this automated transcription, some errors in transcription may have occurred.

## 2019-07-23 RX ORDER — MECLIZINE HCL 12.5 MG/1
12.5 TABLET ORAL 3 TIMES DAILY PRN
Qty: 90 TABLET | Refills: 0 | Status: SHIPPED | OUTPATIENT
Start: 2019-07-23 | End: 2020-08-28 | Stop reason: SDUPTHER

## 2019-07-26 ENCOUNTER — HOSPITAL ENCOUNTER (OUTPATIENT)
Age: 79
Discharge: HOME | End: 2019-07-26
Payer: MEDICARE

## 2019-07-26 ENCOUNTER — OFFICE VISIT (OUTPATIENT)
Dept: FAMILY MEDICINE CLINIC | Age: 79
End: 2019-07-26
Payer: MEDICARE

## 2019-07-26 ENCOUNTER — HOSPITAL ENCOUNTER (OUTPATIENT)
Dept: GENERAL RADIOLOGY | Age: 79
Discharge: HOME | End: 2019-07-26
Payer: MEDICARE

## 2019-07-26 VITALS
WEIGHT: 152 LBS | SYSTOLIC BLOOD PRESSURE: 140 MMHG | TEMPERATURE: 98.6 F | DIASTOLIC BLOOD PRESSURE: 66 MMHG | HEART RATE: 74 BPM | BODY MASS INDEX: 26.93 KG/M2 | OXYGEN SATURATION: 96 %

## 2019-07-26 DIAGNOSIS — I50.21 ACUTE SYSTOLIC CHF (CONGESTIVE HEART FAILURE) (HCC): ICD-10-CM

## 2019-07-26 DIAGNOSIS — I50.21 ACUTE SYSTOLIC CHF (CONGESTIVE HEART FAILURE) (HCC): Primary | ICD-10-CM

## 2019-07-26 LAB
ANION GAP SERPL CALCULATED.3IONS-SCNC: 12 MMOL/L (ref 3–16)
BUN BLDV-MCNC: 29 MG/DL (ref 7–20)
CALCIUM SERPL-MCNC: 9.3 MG/DL (ref 8.3–10.6)
CHLORIDE BLD-SCNC: 108 MMOL/L (ref 99–110)
CO2: 23 MMOL/L (ref 21–32)
CREAT SERPL-MCNC: 1.3 MG/DL (ref 0.6–1.2)
GFR AFRICAN AMERICAN: 48
GFR NON-AFRICAN AMERICAN: 40
GLUCOSE BLD-MCNC: 101 MG/DL (ref 70–99)
POTASSIUM SERPL-SCNC: 4.8 MMOL/L (ref 3.5–5.1)
PRO-BNP: 4215 PG/ML (ref 0–449)
SODIUM BLD-SCNC: 143 MMOL/L (ref 136–145)

## 2019-07-26 PROCEDURE — G8598 ASA/ANTIPLAT THER USED: HCPCS | Performed by: FAMILY MEDICINE

## 2019-07-26 PROCEDURE — 1123F ACP DISCUSS/DSCN MKR DOCD: CPT | Performed by: FAMILY MEDICINE

## 2019-07-26 PROCEDURE — 80048 BASIC METABOLIC PNL TOTAL CA: CPT

## 2019-07-26 PROCEDURE — G8399 PT W/DXA RESULTS DOCUMENT: HCPCS | Performed by: FAMILY MEDICINE

## 2019-07-26 PROCEDURE — 1036F TOBACCO NON-USER: CPT | Performed by: FAMILY MEDICINE

## 2019-07-26 PROCEDURE — 4040F PNEUMOC VAC/ADMIN/RCVD: CPT | Performed by: FAMILY MEDICINE

## 2019-07-26 PROCEDURE — 1090F PRES/ABSN URINE INCON ASSESS: CPT | Performed by: FAMILY MEDICINE

## 2019-07-26 PROCEDURE — 83880 ASSAY OF NATRIURETIC PEPTIDE: CPT

## 2019-07-26 PROCEDURE — 36415 COLL VENOUS BLD VENIPUNCTURE: CPT

## 2019-07-26 PROCEDURE — 71046 X-RAY EXAM CHEST 2 VIEWS: CPT

## 2019-07-26 PROCEDURE — 99213 OFFICE O/P EST LOW 20 MIN: CPT | Performed by: FAMILY MEDICINE

## 2019-07-26 PROCEDURE — G8427 DOCREV CUR MEDS BY ELIG CLIN: HCPCS | Performed by: FAMILY MEDICINE

## 2019-07-26 PROCEDURE — G8417 CALC BMI ABV UP PARAM F/U: HCPCS | Performed by: FAMILY MEDICINE

## 2019-07-29 ENCOUNTER — HOSPITAL ENCOUNTER (OUTPATIENT)
Age: 79
Discharge: HOME | End: 2019-07-29
Payer: MEDICARE

## 2019-07-29 ENCOUNTER — APPOINTMENT (OUTPATIENT)
Dept: CARDIAC CATH/INVASIVE PROCEDURES | Age: 79
End: 2019-07-29
Payer: MEDICARE

## 2019-07-29 ENCOUNTER — OFFICE VISIT (OUTPATIENT)
Dept: FAMILY MEDICINE CLINIC | Age: 79
End: 2019-07-29
Payer: MEDICARE

## 2019-07-29 VITALS
HEART RATE: 71 BPM | BODY MASS INDEX: 25.4 KG/M2 | WEIGHT: 143.4 LBS | SYSTOLIC BLOOD PRESSURE: 138 MMHG | OXYGEN SATURATION: 97 % | DIASTOLIC BLOOD PRESSURE: 60 MMHG

## 2019-07-29 DIAGNOSIS — I50.9 CONGESTIVE HEART FAILURE WITH LEFT VENTRICULAR DYSFUNCTION (HCC): Primary | ICD-10-CM

## 2019-07-29 DIAGNOSIS — I50.21 ACUTE SYSTOLIC CHF (CONGESTIVE HEART FAILURE) (HCC): ICD-10-CM

## 2019-07-29 DIAGNOSIS — I50.21 ACUTE SYSTOLIC CHF (CONGESTIVE HEART FAILURE) (HCC): Primary | ICD-10-CM

## 2019-07-29 LAB
ANION GAP SERPL CALCULATED.3IONS-SCNC: 17 MMOL/L (ref 3–16)
BUN BLDV-MCNC: 48 MG/DL (ref 7–20)
CALCIUM SERPL-MCNC: 9.3 MG/DL (ref 8.3–10.6)
CHLORIDE BLD-SCNC: 102 MMOL/L (ref 99–110)
CO2: 27 MMOL/L (ref 21–32)
CREAT SERPL-MCNC: 2 MG/DL (ref 0.6–1.2)
GFR AFRICAN AMERICAN: 29
GFR NON-AFRICAN AMERICAN: 24
GLUCOSE BLD-MCNC: 189 MG/DL (ref 70–99)
POTASSIUM SERPL-SCNC: 3.9 MMOL/L (ref 3.5–5.1)
PRO-BNP: 1604 PG/ML (ref 0–449)
SODIUM BLD-SCNC: 146 MMOL/L (ref 136–145)

## 2019-07-29 PROCEDURE — 99213 OFFICE O/P EST LOW 20 MIN: CPT | Performed by: FAMILY MEDICINE

## 2019-07-29 PROCEDURE — 36415 COLL VENOUS BLD VENIPUNCTURE: CPT

## 2019-07-29 PROCEDURE — G8598 ASA/ANTIPLAT THER USED: HCPCS | Performed by: FAMILY MEDICINE

## 2019-07-29 PROCEDURE — 1123F ACP DISCUSS/DSCN MKR DOCD: CPT | Performed by: FAMILY MEDICINE

## 2019-07-29 PROCEDURE — 1036F TOBACCO NON-USER: CPT | Performed by: FAMILY MEDICINE

## 2019-07-29 PROCEDURE — 83880 ASSAY OF NATRIURETIC PEPTIDE: CPT

## 2019-07-29 PROCEDURE — G8427 DOCREV CUR MEDS BY ELIG CLIN: HCPCS | Performed by: FAMILY MEDICINE

## 2019-07-29 PROCEDURE — 1090F PRES/ABSN URINE INCON ASSESS: CPT | Performed by: FAMILY MEDICINE

## 2019-07-29 PROCEDURE — G8417 CALC BMI ABV UP PARAM F/U: HCPCS | Performed by: FAMILY MEDICINE

## 2019-07-29 PROCEDURE — 80048 BASIC METABOLIC PNL TOTAL CA: CPT

## 2019-07-29 PROCEDURE — G8399 PT W/DXA RESULTS DOCUMENT: HCPCS | Performed by: FAMILY MEDICINE

## 2019-07-29 PROCEDURE — 4040F PNEUMOC VAC/ADMIN/RCVD: CPT | Performed by: FAMILY MEDICINE

## 2019-07-30 ENCOUNTER — TELEPHONE (OUTPATIENT)
Dept: CARDIOLOGY CLINIC | Age: 79
End: 2019-07-30

## 2019-07-31 ENCOUNTER — HOSPITAL ENCOUNTER (OUTPATIENT)
Age: 79
Discharge: HOME | End: 2019-07-31
Payer: MEDICARE

## 2019-07-31 ENCOUNTER — OFFICE VISIT (OUTPATIENT)
Dept: CARDIOLOGY CLINIC | Age: 79
End: 2019-07-31
Payer: MEDICARE

## 2019-07-31 VITALS
OXYGEN SATURATION: 98 % | BODY MASS INDEX: 25.69 KG/M2 | SYSTOLIC BLOOD PRESSURE: 130 MMHG | HEIGHT: 63 IN | WEIGHT: 145 LBS | HEART RATE: 64 BPM | DIASTOLIC BLOOD PRESSURE: 70 MMHG

## 2019-07-31 DIAGNOSIS — R06.02 SOB (SHORTNESS OF BREATH): Primary | ICD-10-CM

## 2019-07-31 DIAGNOSIS — R42 DIZZINESS: ICD-10-CM

## 2019-07-31 DIAGNOSIS — I34.0 NON-RHEUMATIC MITRAL REGURGITATION: ICD-10-CM

## 2019-07-31 DIAGNOSIS — Z95.0 PACEMAKER: ICD-10-CM

## 2019-07-31 DIAGNOSIS — R06.02 SOB (SHORTNESS OF BREATH): ICD-10-CM

## 2019-07-31 DIAGNOSIS — I73.9 PAD (PERIPHERAL ARTERY DISEASE) (HCC): ICD-10-CM

## 2019-07-31 LAB
ANION GAP SERPL CALCULATED.3IONS-SCNC: 15 MMOL/L (ref 3–16)
BUN BLDV-MCNC: 47 MG/DL (ref 7–20)
CALCIUM SERPL-MCNC: 9.8 MG/DL (ref 8.3–10.6)
CHLORIDE BLD-SCNC: 102 MMOL/L (ref 99–110)
CO2: 25 MMOL/L (ref 21–32)
CREAT SERPL-MCNC: 1.6 MG/DL (ref 0.6–1.2)
GFR AFRICAN AMERICAN: 38
GFR NON-AFRICAN AMERICAN: 31
GLUCOSE BLD-MCNC: 95 MG/DL (ref 70–99)
POTASSIUM SERPL-SCNC: 4.2 MMOL/L (ref 3.5–5.1)
PRO-BNP: 1269 PG/ML (ref 0–449)
SODIUM BLD-SCNC: 142 MMOL/L (ref 136–145)

## 2019-07-31 PROCEDURE — 36415 COLL VENOUS BLD VENIPUNCTURE: CPT

## 2019-07-31 PROCEDURE — G8427 DOCREV CUR MEDS BY ELIG CLIN: HCPCS | Performed by: NURSE PRACTITIONER

## 2019-07-31 PROCEDURE — 80048 BASIC METABOLIC PNL TOTAL CA: CPT

## 2019-07-31 PROCEDURE — 83880 ASSAY OF NATRIURETIC PEPTIDE: CPT

## 2019-07-31 PROCEDURE — 1123F ACP DISCUSS/DSCN MKR DOCD: CPT | Performed by: NURSE PRACTITIONER

## 2019-07-31 PROCEDURE — G8399 PT W/DXA RESULTS DOCUMENT: HCPCS | Performed by: NURSE PRACTITIONER

## 2019-07-31 PROCEDURE — 1036F TOBACCO NON-USER: CPT | Performed by: NURSE PRACTITIONER

## 2019-07-31 PROCEDURE — 99214 OFFICE O/P EST MOD 30 MIN: CPT | Performed by: NURSE PRACTITIONER

## 2019-07-31 PROCEDURE — 4040F PNEUMOC VAC/ADMIN/RCVD: CPT | Performed by: NURSE PRACTITIONER

## 2019-07-31 PROCEDURE — G8417 CALC BMI ABV UP PARAM F/U: HCPCS | Performed by: NURSE PRACTITIONER

## 2019-07-31 PROCEDURE — 1090F PRES/ABSN URINE INCON ASSESS: CPT | Performed by: NURSE PRACTITIONER

## 2019-07-31 PROCEDURE — G8598 ASA/ANTIPLAT THER USED: HCPCS | Performed by: NURSE PRACTITIONER

## 2019-07-31 NOTE — LETTER
crackles or wheezing. CARDIOVASCULAR:  Regular rate and irregular rhythm with no murmurs, gallops, rubs, or abnormal heart sounds, normal PMI. The apical impulses not displaced. Heart tones are crisp and normal. Cervical veins are not engorged                 JVP less than 8 cm H2O        The carotid upstroke is normal in amplitude and contour without delay or bruit    ABDOMEN:  Normal bowel sounds, non-distended and non-tender to palpation   EXT: No edema, no calf tenderness. Pulses are present bilaterally. DATA:    Lab Results   Component Value Date    ALT 19 07/17/2019    AST 13 (A) 07/17/2019    ALKPHOS 93 07/17/2019    BILITOT 0.4 07/17/2019     Lab Results   Component Value Date    CREATININE 2.0 (H) 07/29/2019    BUN 48 (H) 07/29/2019     (H) 07/29/2019    K 3.9 07/29/2019     07/29/2019    CO2 27 07/29/2019     Lab Results   Component Value Date    TSH 0.751 07/17/2019    B0VFEFP 7.3 10/03/2018     Lab Results   Component Value Date    WBC 7.9 07/17/2019    HGB 13.0 07/17/2019    HCT 40.7 07/17/2019    MCV 80.7 (A) 07/17/2019     07/17/2019     No components found for: CHLPL  Lab Results   Component Value Date    TRIG 272 (H) 05/18/2019    TRIG 227 (A) 03/19/2018    TRIG 240 (H) 06/02/2016     Lab Results   Component Value Date    HDL 28 (L) 05/18/2019    HDL 28 (A) 03/19/2018    HDL 30 (L) 06/02/2016     Lab Results   Component Value Date    LDLCALC 75 05/18/2019    LDLCALC 89 03/19/2018    LDLCALC 75 06/02/2016     Lab Results   Component Value Date    LABVLDL 54 05/18/2019    LABVLDL 45 (A) 03/19/2018    LABVLDL 48 06/02/2016     Cardiac angiogram:   6/19  NSTEMI: treated with heparin drip. Cardiology consulted.  Angiogram findings were as below:      Findings:                      LM       Normal              LAD     50% ostial, mid 100%              Cx        40% OM1 at bifurcation              RCA     Mid 100%              LVG     Not performed              EDP     15 mmHg L-LAD  Patent              S-PDA Known occluded  Severe Ca++:None  Post Cath Dx:Stable CAD, no apparent culprit for NSTEMI  Intervention:  None  Med Rec:        Continue aggressive med tx                          Continue plavix, no asa due to allergy. statin added. LDL 75      . H/o CKD stage 3- stable. Okayed by cardiology for discharge on the same day of angiogram procedure. 8/7/18  The PCI of complex proximal RCA chronic total occlusion was unsuccessful (J- score 3). Underwent successful Angioplasty of high-grade proximal RCA lesion proximal to the . RECOMMENDATIONS:  Attempt on this complex long  was unsuccessful. Lack   Of retrograde collaterals along with a very ambiguous cap as well as a large RV marginal branch and bridging collateral coming off at the cap makes it a  challenging repeat attempt. If she continues to have angina, it is recommended to proceed with the single-vessel SVG to the RCA. Echo: 2/17/16 ([facility])  Conclusions: Normal LV size and systolic function Mild to moderate mitral  regurgitation Mild tricuspid regurgitation  Findings:   Left [facility]: Mild to moderate enlargement of the left atrium. Left Ventricle: Upper limits of normal left ventricle size. No left ventricular  hypertrophy. Normal left ventricular systolic function. The ejection fraction   Is visually estimated to be 55 %. Right [facility]: Normal right atrial size. RightVentricle: Normal right ventricle size. Normal right ventricular function. Aortic Valve: Tri-leaflet aortic valve. Mild aortic cusp calcification. No  aortic valve stenosis. Mild (1+) aortic valve regurgitation. Aorta: No dilatation of the aortic root. IVC/PA: Normal IVC size and normal respiratory  collapse consistent with normal right atrial pressure. Mitral Valve: Mild mitral valve leaflet calcification. Mild to moderate (2+) mitral valve  regurgitation. No mitral valve stenosis.

## 2019-07-31 NOTE — LETTER
[address] Cardiology [address] [address]  Phone: [phone]  Fax: [phone]    No ref. provider found        July 31, 2019       Patient: Bo Ayala   MR Number: [id number]   YOB: 1940   Date of Visit: 7/31/2019       Dear Dr. Aren Askew ref. provider found:        [address]     Outpatient Follow Up Note    CHIEF COMPLAINT / HPI:  FU  visit secondary to CHF exacerbation and SOB,  known CAD/ HTN / AFib/ and hyperlipidemia      66 y. o. female with PMHx of atrial fibrillation, CAD, CABG, TIA, chronic kidney disease, (she has not seen nephrology) congestive heart failure, hypertension . She denies any nausea, vomiting, fever, diarrhea,    She had cath-no intervention. She is having major issue is with neuropathy in legs with severe pain and has been evaluated by DR. Belgica Vicente and scheduled for angio 7/22. But was cancelled due to CHF, and renal insufficiency. She is having a lot of pain in her legs which severely limits her mobility. She has  significant coronary disease with multiple interventions in the past.     She had PFTs which showed severe obstructive disease, she thinks she did get little better with inhaler. On 2/2/17 she had a LHC which showed stable coronary findings. On 8/7/18 patient underwent a unsuccessful  of the RCA. Plan : continue current medications, and if angina persist will PCI SVG to RCA. SHe has had to take more demedex and weight today is down,  DR. Guidry started lisinopril but she has not started it yet.     She is also having issues with dizziness that is all the time           Past Medical History:   Diagnosis Date    Allergic rhinitis, cause unspecified     Arthritis     Atrial fibrillation ([address]     Bronchopneumonia     CAD (coronary artery disease)     stent:  post cataract surgery (CABG)    Cerebral artery occlusion with cerebral infarction ([address]     TIA    Chronic gouty arthropathy LUNGS:  No increased work of breathing and clear to auscultation, no crackles or wheezing. CARDIOVASCULAR:  Regular rate and irregular rhythm with no murmurs, gallops, rubs, or abnormal heart sounds, normal PMI. The apical impulses not displaced. Heart tones are crisp and normal. Cervical veins are not engorged                 JVP less than 8 cm H2O        The carotid upstroke is normal in amplitude and contour without delay or bruit    ABDOMEN:  Normal bowel sounds, non-distended and non-tender to palpation   EXT: No edema, no calf tenderness. Pulses are present bilaterally. DATA:    Lab Results   Component Value Date    ALT 19 07/17/2019    AST 13 (A) 07/17/2019    ALKPHOS 93 07/17/2019    BILITOT 0.4 07/17/2019     Lab Results   Component Value Date    CREATININE 2.0 (H) 07/29/2019    BUN 48 (H) 07/29/2019     (H) 07/29/2019    K 3.9 07/29/2019     07/29/2019    CO2 27 07/29/2019     Lab Results   Component Value Date    TSH 0.751 07/17/2019    G0KDQYH 7.3 10/03/2018     Lab Results   Component Value Date    WBC 7.9 07/17/2019    HGB 13.0 07/17/2019    HCT 40.7 07/17/2019    MCV 80.7 (A) 07/17/2019     07/17/2019     No components found for: CHLPL  Lab Results   Component Value Date    TRIG 272 (H) 05/18/2019    TRIG 227 (A) 03/19/2018    TRIG 240 (H) 06/02/2016     Lab Results   Component Value Date    HDL 28 (L) 05/18/2019    HDL 28 (A) 03/19/2018    HDL 30 (L) 06/02/2016     Lab Results   Component Value Date    LDLCALC 75 05/18/2019    LDLCALC 89 03/19/2018    LDLCALC 75 06/02/2016     Lab Results   Component Value Date    LABVLDL 54 05/18/2019    LABVLDL 45 (A) 03/19/2018    LABVLDL 48 06/02/2016     Cardiac angiogram:   6/19  NSTEMI: treated with heparin drip. Cardiology consulted.  Angiogram findings were as below:      Findings:                      LM       Normal              LAD     50% ostial, mid 100%              Cx        40% OM1 at bifurcation              RCA     Mid 100% LVG     Not performed              EDP     15 mmHg              L-LAD  Patent              S-PDA Known occluded  Severe Ca++:None  Post Cath Dx:Stable CAD, no apparent culprit for NSTEMI  Intervention:  None  Med Rec:        Continue aggressive med tx                          Continue plavix, no asa due to allergy. statin added. LDL 75      . H/o CKD stage 3- stable. Okayed by cardiology for discharge on the same day of angiogram procedure. 8/7/18  The PCI of complex proximal RCA chronic total occlusion was unsuccessful (J- score 3). Underwent successful Angioplasty of high-grade proximal RCA lesion proximal to the . RECOMMENDATIONS:  Attempt on this complex long  was unsuccessful. Lack   Of retrograde collaterals along with a very ambiguous cap as well as a large RV marginal branch and bridging collateral coming off at the cap makes it a  challenging repeat attempt. If she continues to have angina, it is recommended to proceed with the single-vessel SVG to the RCA. Echo: 2/17/16 ([facility])  Conclusions: Normal LV size and systolic function Mild to moderate mitral  regurgitation Mild tricuspid regurgitation  Findings:   Left [facility]: Mild to moderate enlargement of the left atrium. Left Ventricle: Upper limits of normal left ventricle size. No left ventricular  hypertrophy. Normal left ventricular systolic function. The ejection fraction   Is visually estimated to be 55 %. Right [facility]: Normal right atrial size. RightVentricle: Normal right ventricle size. Normal right ventricular function. Aortic Valve: Tri-leaflet aortic valve. Mild aortic cusp calcification. No  aortic valve stenosis. Mild (1+) aortic valve regurgitation. Aorta: No dilatation of the aortic root. IVC/PA: Normal IVC size and normal respiratory  collapse consistent with normal right atrial pressure. Mitral Valve: Mild mitral valve leaflet calcification.  Mild to moderate (2+) mitral valve

## 2019-07-31 NOTE — PROGRESS NOTES
(prn) 100 tablet 0    vitamin D (ERGOCALCIFEROL) 04713 units CAPS capsule Take 1 capsule by mouth once a week 12 capsule 1    nitroGLYCERIN (NITROLINGUAL) 0.4 MG/SPRAY 0.4 mg spray Place 1 spray under the tongue every 5 minutes as needed for Chest pain 1 Bottle 3    metoprolol tartrate (LOPRESSOR) 100 MG tablet Take 1 tablet by mouth 2 times daily 180 tablet 3     No current facility-administered medications for this visit. REVIEW OF SYSTEMS:   CONSTITUTIONAL: No major weight gain or loss, fatigue, weakness, night sweats or fever. There's been no change in energy level, sleep pattern, or activity level. HEENT: No new vision difficulties or ringing in the ears. RESPIRATORY: No new  PND, orthopnea or cough. SOB improved  CARDIOVASCULAR: See HPI  GI: No nausea, vomiting, diarrhea, constipation, abdominal pain or changes in bowel habits. : No urinary frequency, urgency, incontinence hematuria or dysuria. SKIN: No cyanosis or skin lesions. MUSCULOSKELETAL: No new muscle or joint pain. NEUROLOGICAL: No syncope or TIA-like symptoms. PSYCHIATRIC: No anxiety, pain, insomnia or depression    Objective:   PHYSICAL EXAM:        VITALS:    Vitals:    07/31/19 0909   BP: 130/70   Pulse:    SpO2:        CONSTITUTIONAL: Cooperative, no apparent distress, and appears well nourished / developed  NEUROLOGIC:  Awake and orientated to person, place and time. PSYCH: Calm affect. SKIN: Warm and dry. HEENT: Sclera non-icteric, normocephalic, neck supple, no elevation of JVP, normal carotid pulses with no bruits and thyroid normal size. LUNGS:  No increased work of breathing and clear to auscultation, no crackles or wheezing. CARDIOVASCULAR:  Regular rate and irregular rhythm with no murmurs, gallops, rubs, or abnormal heart sounds, normal PMI. The apical impulses not displaced.   Heart tones are crisp and normal. Cervical veins are not engorged                 JVP less than 8 cm H2O        The carotid upstroke is stable. Okayed by cardiology for discharge on the same day of angiogram procedure. 8/7/18  The PCI of complex proximal RCA chronic total occlusion was unsuccessful (J- score 3). Underwent successful Angioplasty of high-grade proximal RCA lesion proximal to the . RECOMMENDATIONS:  Attempt on this complex long  was unsuccessful. Lack   Of retrograde collaterals along with a very ambiguous cap as well as a large RV marginal branch and bridging collateral coming off at the cap makes it a  challenging repeat attempt. If she continues to have angina, it is recommended to proceed with the single-vessel SVG to the RCA. Echo: 2/17/16 ([facility])  Conclusions: Normal LV size and systolic function Mild to moderate mitral  regurgitation Mild tricuspid regurgitation  Findings:   Left [facility]: Mild to moderate enlargement of the left atrium. Left Ventricle: Upper limits of normal left ventricle size. No left ventricular  hypertrophy. Normal left ventricular systolic function. The ejection fraction   Is visually estimated to be 55 %. Right [facility]rium: Normal right atrial size. RightVentricle: Normal right ventricle size. Normal right ventricular function. Aortic Valve: Tri-leaflet aortic valve. Mild aortic cusp calcification. No  aortic valve stenosis. Mild (1+) aortic valve regurgitation. Aorta: No dilatation of the aortic root. IVC/PA: Normal IVC size and normal respiratory  collapse consistent with normal right atrial pressure. Mitral Valve: Mild mitral valve leaflet calcification. Mild to moderate (2+) mitral valve  regurgitation. No mitral valve stenosis. Tricuspid Valve: Mild (1+) tricuspid  regurgitation. The pulmonary artery pressure is normal.   Pulmonic Valve: Mild  pulmonic regurgitation.    Pericardium: Normal pericardium with no significant  pericardial effusion.     Nuc stress 6/28/16: ([facility])   Final Conclusions/Summary  Stress ECG Impressions: No significant ST changes during vasodilator

## 2019-08-01 ENCOUNTER — TELEPHONE (OUTPATIENT)
Dept: CARDIOLOGY CLINIC | Age: 79
End: 2019-08-01

## 2019-08-02 ENCOUNTER — TELEPHONE (OUTPATIENT)
Dept: VASCULAR SURGERY | Age: 79
End: 2019-08-02

## 2019-08-02 NOTE — TELEPHONE ENCOUNTER
----- Message from Елена Martinez MA sent at 8/1/2019  4:15 PM EDT -----  Received call from [place], Cardiology office, [phone] stating that patient is ready to reschedule angiogram. She is no longer in heart failure and creatinine levels are down as well. Patient is very anxious to get this rescheduled.  Thanks [employer]

## 2019-08-07 ENCOUNTER — HOSPITAL ENCOUNTER (OUTPATIENT)
Dept: CARDIAC CATH/INVASIVE PROCEDURES | Age: 79
Discharge: HOME | End: 2019-08-07
Attending: SURGERY | Admitting: SURGERY
Payer: MEDICARE

## 2019-08-07 VITALS
RESPIRATION RATE: 16 BRPM | DIASTOLIC BLOOD PRESSURE: 99 MMHG | HEART RATE: 70 BPM | BODY MASS INDEX: 25.69 KG/M2 | SYSTOLIC BLOOD PRESSURE: 125 MMHG | OXYGEN SATURATION: 99 % | HEIGHT: 63 IN | WEIGHT: 145 LBS | TEMPERATURE: 98 F

## 2019-08-07 PROCEDURE — 36200 PLACE CATHETER IN AORTA: CPT

## 2019-08-07 PROCEDURE — 75625 CONTRAST EXAM ABDOMINL AORTA: CPT | Performed by: SURGERY

## 2019-08-07 PROCEDURE — 2500000003 HC RX 250 WO HCPCS

## 2019-08-07 PROCEDURE — 75625 CONTRAST EXAM ABDOMINL AORTA: CPT

## 2019-08-07 PROCEDURE — 75716 ARTERY X-RAYS ARMS/LEGS: CPT | Performed by: SURGERY

## 2019-08-07 PROCEDURE — 2709999900 HC NON-CHARGEABLE SUPPLY

## 2019-08-07 PROCEDURE — C1894 INTRO/SHEATH, NON-LASER: HCPCS

## 2019-08-07 PROCEDURE — 99152 MOD SED SAME PHYS/QHP 5/>YRS: CPT

## 2019-08-07 PROCEDURE — 99153 MOD SED SAME PHYS/QHP EA: CPT

## 2019-08-07 PROCEDURE — C1887 CATHETER, GUIDING: HCPCS

## 2019-08-07 PROCEDURE — 75716 ARTERY X-RAYS ARMS/LEGS: CPT

## 2019-08-07 PROCEDURE — 6360000004 HC RX CONTRAST MEDICATION: Performed by: SURGERY

## 2019-08-07 PROCEDURE — 6360000002 HC RX W HCPCS

## 2019-08-07 PROCEDURE — 36200 PLACE CATHETER IN AORTA: CPT | Performed by: SURGERY

## 2019-08-07 PROCEDURE — C1769 GUIDE WIRE: HCPCS

## 2019-08-07 RX ORDER — IODIXANOL 320 MG/ML
60 INJECTION, SOLUTION INTRAVASCULAR
Status: COMPLETED | OUTPATIENT
Start: 2019-08-07 | End: 2019-08-07

## 2019-08-07 RX ADMIN — IODIXANOL 60 ML: 320 INJECTION, SOLUTION INTRAVASCULAR at 08:24

## 2019-08-07 NOTE — OP NOTE
The left SFA was occluded. Given the angiographic findings the procedure was then terminated at this point in favor of open surgical revascularization. The patient tolerated the procedure well and was taken to recovery in stable condition. The sheath was pulled with manual pressure held for hemostasis. Findings:  Abdominal Aortogram -   Right and left renal normal infrarenal abdominal aorta shows mild atherosclerosis. Right common iliacs are patent. Distal right external iliac artery is occluded. Left external iliac and bilateral internal iliac arteries are patent. There is mild diffuse atherosclerosis seen throughout the left common and external iliac arteries. Right Lower Extremity -   Reconstitution of the proximal right SFA and profunda the remainder of the SFA and popliteal is normal.  There is tibial disease with a two-vessel runoff on the right. Left Lower Extremity -   Left common femoral and profunda are patent. SFA is occluded with reconstitution of the proximal popliteal.  The remainder of the popliteal is patent. There is a dominant posterior tibial runoff on the left. Plan:  Patient has complete occlusion of the distal right external iliac and common femoral artery as well as left superficial femoral artery occlusion. A discussion will be had with the patient and family regarding left to right femoral to femoral bypass and simultaneous left femoral to above-knee popliteal bypass.         Pepper Jolly MD FACS 8/7/2019 8:23 AM

## 2019-08-07 NOTE — H&P
H&P Update    I have reviewed the history and physical and examined the patient and find no relevant changes. I have reviewed with the patient and/or family the risks, benefits, and alternatives to the procedure. Pre-sedation Assessment    Patient:  Elis Keyes   :   1940  Intended Procedure:  Abdominal aortogram with lower extremity evaluation and possible intervention. Harish Bear nurses notes reviewed and agreed. Medications reviewed  Allergies:    Allergies   Allergen Reactions    Aspirin Nausea Only    Ativan [Lorazepam] Other (See Comments)     hallucinations    Diltiazem Anaphylaxis    Sulfa Antibiotics Rash and Hives    Dabigatran Nausea Only     And indigestion  And indigestion    Aka Pradaxa    Lipitor [Atorvastatin] Other (See Comments)     Muscle pains    Mysoline [Primidone]     Nsaids     Other Other (See Comments)     Nitroglycerin patches causes severe headaches         Pre-Procedure Assessment/Plan:  ASA 2 - Patient with mild systemic disease with no functional limitations  Malampati 2    Level of Sedation Plan:Mild sedation    Post Procedure plan: Return to same level of care      Signed:  Blue Vasquez II, 2019, 7:37 AM

## 2019-08-09 ENCOUNTER — TELEPHONE (OUTPATIENT)
Dept: VASCULAR SURGERY | Age: 79
End: 2019-08-09

## 2019-08-12 ENCOUNTER — TELEPHONE (OUTPATIENT)
Dept: VASCULAR SURGERY | Age: 79
End: 2019-08-12

## 2019-08-12 ENCOUNTER — PREP FOR PROCEDURE (OUTPATIENT)
Dept: VASCULAR SURGERY | Age: 79
End: 2019-08-12

## 2019-08-12 DIAGNOSIS — I73.9 PERIPHERAL VASCULAR DISEASE, UNSPECIFIED (HCC): Primary | ICD-10-CM

## 2019-08-12 DIAGNOSIS — I99.8 OTHER DISORDER OF CIRCULATORY SYSTEM: ICD-10-CM

## 2019-08-12 DIAGNOSIS — I70.213 ATHEROSCLEROSIS OF NATIVE ARTERIES OF EXTREMITIES WITH INTERMITTENT CLAUDICATION, BILATERAL LEGS (HCC): ICD-10-CM

## 2019-08-12 DIAGNOSIS — I70.213 ATHEROSCLEROSIS OF NATIVE ARTERIES OF EXTREMITIES WITH INTERMITTENT CLAUDICATION, BILATERAL LEGS (HCC): Primary | ICD-10-CM

## 2019-08-12 RX ORDER — SODIUM CHLORIDE 0.9 % (FLUSH) 0.9 %
10 SYRINGE (ML) INJECTION PRN
Status: CANCELLED | OUTPATIENT
Start: 2019-08-12

## 2019-08-12 RX ORDER — SODIUM CHLORIDE 0.9 % (FLUSH) 0.9 %
10 SYRINGE (ML) INJECTION EVERY 12 HOURS SCHEDULED
Status: CANCELLED | OUTPATIENT
Start: 2019-08-12

## 2019-08-15 ENCOUNTER — ANESTHESIA EVENT (OUTPATIENT)
Dept: OPERATING ROOM | Age: 79
DRG: 253 | End: 2019-08-15
Payer: MEDICARE

## 2019-08-15 RX ORDER — CELECOXIB 200 MG/1
200 CAPSULE ORAL 2 TIMES DAILY
COMMUNITY
End: 2019-09-11

## 2019-08-15 RX ORDER — HYDROCODONE BITARTRATE AND ACETAMINOPHEN 5; 325 MG/1; MG/1
1 TABLET ORAL EVERY 6 HOURS PRN
COMMUNITY
End: 2019-08-27

## 2019-08-15 ASSESSMENT — ENCOUNTER SYMPTOMS: SHORTNESS OF BREATH: 1

## 2019-08-15 NOTE — PROGRESS NOTES
Name_______________________________________Printed:____________________  Date and time of surgery_______8/22/19 1100_________________Arrival Time:_____0900 main___________   1. Do not eat or drink anything after 12 midnight (or____hours) prior to surgery. This includes no water, chewing gum or mints. Endoscopy patients follow your doctors bowel prep instructions,which may include taking part of prep after midnight. 2. Take the following pills with a small sip of water on the morning of surgery__________metoprolol, amlodipine, thyroid, topamax_________________________________________                  Do not take blood pressure medications ending in pril or sartan the rudy prior to surgery or the morning of surgery_   3. Aspirin, Ibuprofen, Advil, Naproxen, Vitamin E and other Anti-inflammatory products should be stopped for 5 days before surgery or as directed by your physician. 4. Check with your Doctor regarding stopping Plavix, Coumadin,Eliquis, Lovenox,Effient,Pradaxa,Xarelto, Fragmin or other blood thinners and follow their instructions. 5. Do not smoke, and do not drink any alcoholic beverages 24 hours prior to surgery. This includes NA Beer. Refrain from the usage of any recreational drugs. 6. You may brush your teeth and gargle the morning of surgery. DO NOT SWALLOW WATER   7. You MUST make arrangements for a responsible adult to stay on site while you are here and take you home after your surgery. You will not be allowed to leave alone or drive yourself home. It is strongly suggested someone stay with you the first 24 hrs. Your surgery will be cancelled if you do not have a ride home. 8. A parent/legal guardian must accompany a child scheduled for surgery and plan to stay at the hospital until the child is discharged. Please do not bring other children with you.    9. Please wear simple, loose fitting clothing to the hospital.  Do not bring valuables (money, credit cards, checkbooks, etc.) Do not

## 2019-08-21 ENCOUNTER — HOSPITAL ENCOUNTER (OUTPATIENT)
Dept: VASCULAR LAB | Age: 79
Discharge: HOME | DRG: 253 | End: 2019-08-21
Payer: MEDICARE

## 2019-08-21 DIAGNOSIS — I73.9 PERIPHERAL VASCULAR DISEASE, UNSPECIFIED (HCC): ICD-10-CM

## 2019-08-21 DIAGNOSIS — I70.213 ATHEROSCLEROSIS OF NATIVE ARTERIES OF EXTREMITIES WITH INTERMITTENT CLAUDICATION, BILATERAL LEGS (HCC): ICD-10-CM

## 2019-08-21 PROCEDURE — 93971 EXTREMITY STUDY: CPT

## 2019-08-22 ENCOUNTER — HOSPITAL ENCOUNTER (INPATIENT)
Age: 79
LOS: 4 days | Discharge: HOME HEALTH | DRG: 253 | End: 2019-08-26
Attending: SURGERY | Admitting: SURGERY
Payer: MEDICARE

## 2019-08-22 ENCOUNTER — ANESTHESIA (OUTPATIENT)
Dept: OPERATING ROOM | Age: 79
DRG: 253 | End: 2019-08-22
Payer: MEDICARE

## 2019-08-22 VITALS
DIASTOLIC BLOOD PRESSURE: 58 MMHG | RESPIRATION RATE: 15 BRPM | TEMPERATURE: 98.8 F | OXYGEN SATURATION: 97 % | SYSTOLIC BLOOD PRESSURE: 108 MMHG

## 2019-08-22 DIAGNOSIS — G89.18 POST-OP PAIN: Primary | ICD-10-CM

## 2019-08-22 LAB
ABO/RH: NORMAL
ANTIBODY SCREEN: NORMAL
APTT: 36.6 SEC (ref 26–36)
INR BLD: 1.11 (ref 0.86–1.14)
PROTHROMBIN TIME: 12.7 SEC (ref 9.8–13)

## 2019-08-22 PROCEDURE — 2000000000 HC ICU R&B

## 2019-08-22 PROCEDURE — 7100000001 HC PACU RECOVERY - ADDTL 15 MIN: Performed by: SURGERY

## 2019-08-22 PROCEDURE — 6370000000 HC RX 637 (ALT 250 FOR IP): Performed by: SURGERY

## 2019-08-22 PROCEDURE — 86900 BLOOD TYPING SEROLOGIC ABO: CPT

## 2019-08-22 PROCEDURE — 6360000002 HC RX W HCPCS: Performed by: NURSE ANESTHETIST, CERTIFIED REGISTERED

## 2019-08-22 PROCEDURE — 6360000002 HC RX W HCPCS: Performed by: SURGERY

## 2019-08-22 PROCEDURE — 94760 N-INVAS EAR/PLS OXIMETRY 1: CPT

## 2019-08-22 PROCEDURE — 7100000000 HC PACU RECOVERY - FIRST 15 MIN: Performed by: SURGERY

## 2019-08-22 PROCEDURE — 2580000003 HC RX 258: Performed by: NURSE ANESTHETIST, CERTIFIED REGISTERED

## 2019-08-22 PROCEDURE — 35661 BPG FEMORAL-FEMORAL: CPT | Performed by: SURGERY

## 2019-08-22 PROCEDURE — 2500000003 HC RX 250 WO HCPCS: Performed by: NURSE ANESTHETIST, CERTIFIED REGISTERED

## 2019-08-22 PROCEDURE — 85730 THROMBOPLASTIN TIME PARTIAL: CPT

## 2019-08-22 PROCEDURE — 36415 COLL VENOUS BLD VENIPUNCTURE: CPT

## 2019-08-22 PROCEDURE — 2700000000 HC OXYGEN THERAPY PER DAY

## 2019-08-22 PROCEDURE — 2580000003 HC RX 258: Performed by: SURGERY

## 2019-08-22 PROCEDURE — 3700000000 HC ANESTHESIA ATTENDED CARE: Performed by: SURGERY

## 2019-08-22 PROCEDURE — 3700000001 HC ADD 15 MINUTES (ANESTHESIA): Performed by: SURGERY

## 2019-08-22 PROCEDURE — 6360000002 HC RX W HCPCS: Performed by: FAMILY MEDICINE

## 2019-08-22 PROCEDURE — 6370000000 HC RX 637 (ALT 250 FOR IP)

## 2019-08-22 PROCEDURE — 041L0JL BYPASS LEFT FEMORAL ARTERY TO POPLITEAL ARTERY WITH SYNTHETIC SUBSTITUTE, OPEN APPROACH: ICD-10-PCS | Performed by: SURGERY

## 2019-08-22 PROCEDURE — C1768 GRAFT, VASCULAR: HCPCS | Performed by: SURGERY

## 2019-08-22 PROCEDURE — 86850 RBC ANTIBODY SCREEN: CPT

## 2019-08-22 PROCEDURE — 041L0JH BYPASS LEFT FEMORAL ARTERY TO RIGHT FEMORAL ARTERY WITH SYNTHETIC SUBSTITUTE, OPEN APPROACH: ICD-10-PCS | Performed by: SURGERY

## 2019-08-22 PROCEDURE — 3600000014 HC SURGERY LEVEL 4 ADDTL 15MIN: Performed by: SURGERY

## 2019-08-22 PROCEDURE — 94150 VITAL CAPACITY TEST: CPT

## 2019-08-22 PROCEDURE — 2709999900 HC NON-CHARGEABLE SUPPLY: Performed by: SURGERY

## 2019-08-22 PROCEDURE — 3600000004 HC SURGERY LEVEL 4 BASE: Performed by: SURGERY

## 2019-08-22 PROCEDURE — 6360000002 HC RX W HCPCS: Performed by: NURSE PRACTITIONER

## 2019-08-22 PROCEDURE — 86901 BLOOD TYPING SEROLOGIC RH(D): CPT

## 2019-08-22 PROCEDURE — 2580000003 HC RX 258: Performed by: FAMILY MEDICINE

## 2019-08-22 PROCEDURE — 85610 PROTHROMBIN TIME: CPT

## 2019-08-22 PROCEDURE — 35656 BPG FEMORAL-POPLITEAL: CPT | Performed by: SURGERY

## 2019-08-22 DEVICE — IMPLANTABLE DEVICE: Type: IMPLANTABLE DEVICE | Status: FUNCTIONAL

## 2019-08-22 RX ORDER — ROCURONIUM BROMIDE 10 MG/ML
INJECTION, SOLUTION INTRAVENOUS PRN
Status: DISCONTINUED | OUTPATIENT
Start: 2019-08-22 | End: 2019-08-22 | Stop reason: SDUPTHER

## 2019-08-22 RX ORDER — ONDANSETRON 2 MG/ML
INJECTION INTRAMUSCULAR; INTRAVENOUS PRN
Status: DISCONTINUED | OUTPATIENT
Start: 2019-08-22 | End: 2019-08-22 | Stop reason: SDUPTHER

## 2019-08-22 RX ORDER — OXYCODONE HYDROCHLORIDE AND ACETAMINOPHEN 5; 325 MG/1; MG/1
1 TABLET ORAL EVERY 4 HOURS PRN
Status: DISCONTINUED | OUTPATIENT
Start: 2019-08-22 | End: 2019-08-24

## 2019-08-22 RX ORDER — LABETALOL HYDROCHLORIDE 5 MG/ML
5 INJECTION, SOLUTION INTRAVENOUS EVERY 10 MIN PRN
Status: DISCONTINUED | OUTPATIENT
Start: 2019-08-22 | End: 2019-08-22 | Stop reason: HOSPADM

## 2019-08-22 RX ORDER — PROMETHAZINE HYDROCHLORIDE 25 MG/ML
6.25 INJECTION, SOLUTION INTRAMUSCULAR; INTRAVENOUS PRN
Status: DISCONTINUED | OUTPATIENT
Start: 2019-08-22 | End: 2019-08-22 | Stop reason: HOSPADM

## 2019-08-22 RX ORDER — VANCOMYCIN HYDROCHLORIDE 1 G/200ML
1000 INJECTION, SOLUTION INTRAVENOUS
Status: DISCONTINUED | OUTPATIENT
Start: 2019-08-22 | End: 2019-08-22

## 2019-08-22 RX ORDER — SODIUM CHLORIDE 0.9 % (FLUSH) 0.9 %
10 SYRINGE (ML) INJECTION PRN
Status: DISCONTINUED | OUTPATIENT
Start: 2019-08-22 | End: 2019-08-22 | Stop reason: HOSPADM

## 2019-08-22 RX ORDER — OXYCODONE HYDROCHLORIDE 5 MG/1
5 TABLET ORAL PRN
Status: DISCONTINUED | OUTPATIENT
Start: 2019-08-22 | End: 2019-08-22 | Stop reason: HOSPADM

## 2019-08-22 RX ORDER — HYDROMORPHONE HCL 110MG/55ML
0.5 PATIENT CONTROLLED ANALGESIA SYRINGE INTRAVENOUS EVERY 5 MIN PRN
Status: DISCONTINUED | OUTPATIENT
Start: 2019-08-22 | End: 2019-08-22 | Stop reason: HOSPADM

## 2019-08-22 RX ORDER — PROTAMINE SULFATE 10 MG/ML
INJECTION, SOLUTION INTRAVENOUS PRN
Status: DISCONTINUED | OUTPATIENT
Start: 2019-08-22 | End: 2019-08-22 | Stop reason: SDUPTHER

## 2019-08-22 RX ORDER — SUCCINYLCHOLINE/SOD CL,ISO/PF 200MG/10ML
SYRINGE (ML) INTRAVENOUS PRN
Status: DISCONTINUED | OUTPATIENT
Start: 2019-08-22 | End: 2019-08-22 | Stop reason: SDUPTHER

## 2019-08-22 RX ORDER — SODIUM CHLORIDE 9 MG/ML
INJECTION, SOLUTION INTRAVENOUS CONTINUOUS PRN
Status: DISCONTINUED | OUTPATIENT
Start: 2019-08-22 | End: 2019-08-22 | Stop reason: SDUPTHER

## 2019-08-22 RX ORDER — SODIUM CHLORIDE 0.9 % (FLUSH) 0.9 %
10 SYRINGE (ML) INJECTION PRN
Status: DISCONTINUED | OUTPATIENT
Start: 2019-08-22 | End: 2019-08-26 | Stop reason: HOSPADM

## 2019-08-22 RX ORDER — LIDOCAINE HYDROCHLORIDE 20 MG/ML
INJECTION, SOLUTION EPIDURAL; INFILTRATION; INTRACAUDAL; PERINEURAL PRN
Status: DISCONTINUED | OUTPATIENT
Start: 2019-08-22 | End: 2019-08-22 | Stop reason: SDUPTHER

## 2019-08-22 RX ORDER — OXYCODONE HYDROCHLORIDE 5 MG/1
10 TABLET ORAL PRN
Status: DISCONTINUED | OUTPATIENT
Start: 2019-08-22 | End: 2019-08-22 | Stop reason: HOSPADM

## 2019-08-22 RX ORDER — HYDROMORPHONE HCL 110MG/55ML
0.25 PATIENT CONTROLLED ANALGESIA SYRINGE INTRAVENOUS EVERY 5 MIN PRN
Status: DISCONTINUED | OUTPATIENT
Start: 2019-08-22 | End: 2019-08-22 | Stop reason: HOSPADM

## 2019-08-22 RX ORDER — SODIUM CHLORIDE 0.9 % (FLUSH) 0.9 %
10 SYRINGE (ML) INJECTION EVERY 12 HOURS SCHEDULED
Status: DISCONTINUED | OUTPATIENT
Start: 2019-08-22 | End: 2019-08-22 | Stop reason: HOSPADM

## 2019-08-22 RX ORDER — MAGNESIUM SULFATE HEPTAHYDRATE 500 MG/ML
INJECTION, SOLUTION INTRAMUSCULAR; INTRAVENOUS PRN
Status: DISCONTINUED | OUTPATIENT
Start: 2019-08-22 | End: 2019-08-22 | Stop reason: SDUPTHER

## 2019-08-22 RX ORDER — CEFAZOLIN SODIUM 2 G/100ML
2 INJECTION, SOLUTION INTRAVENOUS
Status: COMPLETED | OUTPATIENT
Start: 2019-08-22 | End: 2019-08-22

## 2019-08-22 RX ORDER — MORPHINE SULFATE 2 MG/ML
2 INJECTION, SOLUTION INTRAMUSCULAR; INTRAVENOUS
Status: DISCONTINUED | OUTPATIENT
Start: 2019-08-22 | End: 2019-08-26 | Stop reason: HOSPADM

## 2019-08-22 RX ORDER — ACETAMINOPHEN 325 MG/1
650 TABLET ORAL EVERY 4 HOURS PRN
Status: DISCONTINUED | OUTPATIENT
Start: 2019-08-22 | End: 2019-08-24

## 2019-08-22 RX ORDER — ONDANSETRON 2 MG/ML
4 INJECTION INTRAMUSCULAR; INTRAVENOUS EVERY 6 HOURS PRN
Status: DISCONTINUED | OUTPATIENT
Start: 2019-08-22 | End: 2019-08-26 | Stop reason: HOSPADM

## 2019-08-22 RX ORDER — DIPHENHYDRAMINE HYDROCHLORIDE 50 MG/ML
12.5 INJECTION INTRAMUSCULAR; INTRAVENOUS
Status: DISCONTINUED | OUTPATIENT
Start: 2019-08-22 | End: 2019-08-22 | Stop reason: HOSPADM

## 2019-08-22 RX ORDER — CEFAZOLIN SODIUM 2 G/100ML
2 INJECTION, SOLUTION INTRAVENOUS EVERY 8 HOURS
Status: COMPLETED | OUTPATIENT
Start: 2019-08-22 | End: 2019-08-23

## 2019-08-22 RX ORDER — SODIUM CHLORIDE 9 MG/ML
INJECTION, SOLUTION INTRAVENOUS CONTINUOUS
Status: DISCONTINUED | OUTPATIENT
Start: 2019-08-22 | End: 2019-08-22

## 2019-08-22 RX ORDER — FENTANYL CITRATE 50 UG/ML
INJECTION, SOLUTION INTRAMUSCULAR; INTRAVENOUS PRN
Status: DISCONTINUED | OUTPATIENT
Start: 2019-08-22 | End: 2019-08-22 | Stop reason: SDUPTHER

## 2019-08-22 RX ORDER — PROPOFOL 10 MG/ML
INJECTION, EMULSION INTRAVENOUS PRN
Status: DISCONTINUED | OUTPATIENT
Start: 2019-08-22 | End: 2019-08-22 | Stop reason: SDUPTHER

## 2019-08-22 RX ORDER — SODIUM CHLORIDE, SODIUM LACTATE, POTASSIUM CHLORIDE, CALCIUM CHLORIDE 600; 310; 30; 20 MG/100ML; MG/100ML; MG/100ML; MG/100ML
INJECTION, SOLUTION INTRAVENOUS CONTINUOUS
Status: DISCONTINUED | OUTPATIENT
Start: 2019-08-22 | End: 2019-08-24

## 2019-08-22 RX ORDER — FENTANYL CITRATE 50 UG/ML
50 INJECTION, SOLUTION INTRAMUSCULAR; INTRAVENOUS EVERY 5 MIN PRN
Status: DISCONTINUED | OUTPATIENT
Start: 2019-08-22 | End: 2019-08-22 | Stop reason: HOSPADM

## 2019-08-22 RX ORDER — CLINDAMYCIN PHOSPHATE 900 MG/50ML
900 INJECTION INTRAVENOUS
Status: DISCONTINUED | OUTPATIENT
Start: 2019-08-22 | End: 2019-08-22

## 2019-08-22 RX ORDER — MEPERIDINE HYDROCHLORIDE 25 MG/ML
12.5 INJECTION INTRAMUSCULAR; INTRAVENOUS; SUBCUTANEOUS EVERY 5 MIN PRN
Status: DISCONTINUED | OUTPATIENT
Start: 2019-08-22 | End: 2019-08-22 | Stop reason: HOSPADM

## 2019-08-22 RX ORDER — HEPARIN SODIUM 1000 [USP'U]/ML
INJECTION, SOLUTION INTRAVENOUS; SUBCUTANEOUS PRN
Status: DISCONTINUED | OUTPATIENT
Start: 2019-08-22 | End: 2019-08-22 | Stop reason: SDUPTHER

## 2019-08-22 RX ORDER — SODIUM CHLORIDE 0.9 % (FLUSH) 0.9 %
10 SYRINGE (ML) INJECTION EVERY 12 HOURS SCHEDULED
Status: DISCONTINUED | OUTPATIENT
Start: 2019-08-22 | End: 2019-08-26 | Stop reason: HOSPADM

## 2019-08-22 RX ADMIN — SODIUM CHLORIDE: 9 INJECTION, SOLUTION INTRAVENOUS at 11:38

## 2019-08-22 RX ADMIN — MORPHINE SULFATE 2 MG: 2 INJECTION, SOLUTION INTRAMUSCULAR; INTRAVENOUS at 17:49

## 2019-08-22 RX ADMIN — MORPHINE SULFATE 2 MG: 2 INJECTION, SOLUTION INTRAMUSCULAR; INTRAVENOUS at 20:47

## 2019-08-22 RX ADMIN — HYDROMORPHONE HYDROCHLORIDE 0.5 MG: 2 INJECTION INTRAMUSCULAR; INTRAVENOUS; SUBCUTANEOUS at 16:40

## 2019-08-22 RX ADMIN — ROCURONIUM BROMIDE 10 MG: 10 INJECTION, SOLUTION INTRAVENOUS at 13:25

## 2019-08-22 RX ADMIN — CEFAZOLIN SODIUM 2 G: 2 INJECTION, SOLUTION INTRAVENOUS at 20:40

## 2019-08-22 RX ADMIN — SODIUM CHLORIDE: 9 INJECTION, SOLUTION INTRAVENOUS at 12:00

## 2019-08-22 RX ADMIN — CEFAZOLIN SODIUM 2 G: 2 INJECTION, SOLUTION INTRAVENOUS at 12:25

## 2019-08-22 RX ADMIN — LIDOCAINE HYDROCHLORIDE 100 MG: 20 INJECTION, SOLUTION EPIDURAL; INFILTRATION; INTRACAUDAL; PERINEURAL at 12:33

## 2019-08-22 RX ADMIN — SODIUM CHLORIDE, POTASSIUM CHLORIDE, SODIUM LACTATE AND CALCIUM CHLORIDE: 600; 310; 30; 20 INJECTION, SOLUTION INTRAVENOUS at 20:44

## 2019-08-22 RX ADMIN — PHENYLEPHRINE HYDROCHLORIDE 30 MCG/MIN: 10 INJECTION INTRAVENOUS at 12:49

## 2019-08-22 RX ADMIN — FENTANYL CITRATE 50 MCG: 50 INJECTION, SOLUTION INTRAMUSCULAR; INTRAVENOUS at 12:52

## 2019-08-22 RX ADMIN — FENTANYL CITRATE 50 MCG: 50 INJECTION, SOLUTION INTRAMUSCULAR; INTRAVENOUS at 15:14

## 2019-08-22 RX ADMIN — HEPARIN SODIUM 5000 UNITS: 1000 INJECTION INTRAVENOUS; SUBCUTANEOUS at 13:34

## 2019-08-22 RX ADMIN — FENTANYL CITRATE 50 MCG: 50 INJECTION, SOLUTION INTRAMUSCULAR; INTRAVENOUS at 13:44

## 2019-08-22 RX ADMIN — ONDANSETRON 4 MG: 2 INJECTION INTRAMUSCULAR; INTRAVENOUS at 15:22

## 2019-08-22 RX ADMIN — Medication 60 MG: at 12:34

## 2019-08-22 RX ADMIN — PROPOFOL 100 MG: 10 INJECTION, EMULSION INTRAVENOUS at 12:33

## 2019-08-22 RX ADMIN — SUGAMMADEX 135 MG: 100 INJECTION, SOLUTION INTRAVENOUS at 15:17

## 2019-08-22 RX ADMIN — PROTAMINE SULFATE 20 MG: 10 INJECTION, SOLUTION INTRAVENOUS at 15:03

## 2019-08-22 RX ADMIN — FENTANYL CITRATE 50 MCG: 50 INJECTION, SOLUTION INTRAMUSCULAR; INTRAVENOUS at 14:53

## 2019-08-22 RX ADMIN — MAGNESIUM SULFATE HEPTAHYDRATE 2 G: 500 INJECTION, SOLUTION INTRAMUSCULAR; INTRAVENOUS at 12:40

## 2019-08-22 RX ADMIN — FENTANYL CITRATE 50 MCG: 50 INJECTION, SOLUTION INTRAMUSCULAR; INTRAVENOUS at 13:15

## 2019-08-22 RX ADMIN — ROCURONIUM BROMIDE 20 MG: 10 INJECTION, SOLUTION INTRAVENOUS at 12:55

## 2019-08-22 RX ADMIN — OXYCODONE HYDROCHLORIDE AND ACETAMINOPHEN 1 TABLET: 5; 325 TABLET ORAL at 18:47

## 2019-08-22 RX ADMIN — HYDROMORPHONE HYDROCHLORIDE 0.5 MG: 2 INJECTION INTRAMUSCULAR; INTRAVENOUS; SUBCUTANEOUS at 16:13

## 2019-08-22 RX ADMIN — ROCURONIUM BROMIDE 10 MG: 10 INJECTION, SOLUTION INTRAVENOUS at 13:44

## 2019-08-22 ASSESSMENT — PULMONARY FUNCTION TESTS
PIF_VALUE: 20
PIF_VALUE: 20
PIF_VALUE: 42
PIF_VALUE: 19
PIF_VALUE: 4
PIF_VALUE: 20
PIF_VALUE: 22
PIF_VALUE: 20
PIF_VALUE: 4
PIF_VALUE: 19
PIF_VALUE: 20
PIF_VALUE: 21
PIF_VALUE: 20
PIF_VALUE: 20
PIF_VALUE: 22
PIF_VALUE: 4
PIF_VALUE: 21
PIF_VALUE: 23
PIF_VALUE: 20
PIF_VALUE: 20
PIF_VALUE: 22
PIF_VALUE: 19
PIF_VALUE: 21
PIF_VALUE: 23
PIF_VALUE: 4
PIF_VALUE: 20
PIF_VALUE: 22
PIF_VALUE: 22
PIF_VALUE: 20
PIF_VALUE: 22
PIF_VALUE: 20
PIF_VALUE: 22
PIF_VALUE: 12
PIF_VALUE: 20
PIF_VALUE: 21
PIF_VALUE: 21
PIF_VALUE: 22
PIF_VALUE: 20
PIF_VALUE: 20
PIF_VALUE: 19
PIF_VALUE: 20
PIF_VALUE: 4
PIF_VALUE: 20
PIF_VALUE: 20
PIF_VALUE: 21
PIF_VALUE: 20
PIF_VALUE: 20
PIF_VALUE: 22
PIF_VALUE: 21
PIF_VALUE: 22
PIF_VALUE: 21
PIF_VALUE: 20
PIF_VALUE: 0
PIF_VALUE: 4
PIF_VALUE: 21
PIF_VALUE: 20
PIF_VALUE: 22
PIF_VALUE: 3
PIF_VALUE: 21
PIF_VALUE: 20
PIF_VALUE: 21
PIF_VALUE: 20
PIF_VALUE: 21
PIF_VALUE: 19
PIF_VALUE: 1
PIF_VALUE: 20
PIF_VALUE: 1
PIF_VALUE: 20
PIF_VALUE: 28
PIF_VALUE: 21
PIF_VALUE: 2
PIF_VALUE: 22
PIF_VALUE: 21
PIF_VALUE: 4
PIF_VALUE: 21
PIF_VALUE: 21
PIF_VALUE: 20
PIF_VALUE: 22
PIF_VALUE: 22
PIF_VALUE: 21
PIF_VALUE: 4
PIF_VALUE: 19
PIF_VALUE: 21
PIF_VALUE: 4
PIF_VALUE: 20
PIF_VALUE: 19
PIF_VALUE: 20
PIF_VALUE: 3
PIF_VALUE: 22
PIF_VALUE: 21
PIF_VALUE: 22
PIF_VALUE: 4
PIF_VALUE: 20
PIF_VALUE: 4
PIF_VALUE: 23
PIF_VALUE: 20
PIF_VALUE: 21
PIF_VALUE: 4
PIF_VALUE: 20
PIF_VALUE: 22
PIF_VALUE: 21
PIF_VALUE: 22
PIF_VALUE: 22
PIF_VALUE: 1
PIF_VALUE: 20
PIF_VALUE: 22
PIF_VALUE: 22
PIF_VALUE: 20
PIF_VALUE: 21
PIF_VALUE: 22
PIF_VALUE: 33
PIF_VALUE: 4
PIF_VALUE: 21
PIF_VALUE: 21
PIF_VALUE: 2
PIF_VALUE: 22
PIF_VALUE: 19
PIF_VALUE: 21
PIF_VALUE: 22
PIF_VALUE: 25
PIF_VALUE: 21
PIF_VALUE: 22
PIF_VALUE: 0
PIF_VALUE: 21
PIF_VALUE: 27
PIF_VALUE: 22
PIF_VALUE: 20
PIF_VALUE: 20
PIF_VALUE: 22
PIF_VALUE: 20
PIF_VALUE: 21
PIF_VALUE: 20
PIF_VALUE: 20
PIF_VALUE: 21
PIF_VALUE: 24
PIF_VALUE: 22
PIF_VALUE: 22
PIF_VALUE: 21
PIF_VALUE: 22
PIF_VALUE: 4
PIF_VALUE: 22
PIF_VALUE: 4
PIF_VALUE: 2
PIF_VALUE: 22
PIF_VALUE: 21
PIF_VALUE: 22
PIF_VALUE: 20
PIF_VALUE: 0
PIF_VALUE: 21
PIF_VALUE: 21
PIF_VALUE: 23
PIF_VALUE: 20
PIF_VALUE: 22
PIF_VALUE: 21
PIF_VALUE: 22
PIF_VALUE: 21
PIF_VALUE: 23
PIF_VALUE: 21
PIF_VALUE: 20
PIF_VALUE: 19
PIF_VALUE: 21
PIF_VALUE: 21
PIF_VALUE: 19
PIF_VALUE: 21
PIF_VALUE: 20
PIF_VALUE: 21
PIF_VALUE: 22
PIF_VALUE: 20
PIF_VALUE: 22
PIF_VALUE: 1
PIF_VALUE: 20
PIF_VALUE: 20
PIF_VALUE: 21
PIF_VALUE: 21
PIF_VALUE: 20
PIF_VALUE: 22
PIF_VALUE: 19
PIF_VALUE: 22
PIF_VALUE: 20
PIF_VALUE: 21
PIF_VALUE: 21
PIF_VALUE: 22
PIF_VALUE: 3
PIF_VALUE: 23
PIF_VALUE: 21
PIF_VALUE: 20
PIF_VALUE: 20
PIF_VALUE: 4
PIF_VALUE: 0
PIF_VALUE: 23
PIF_VALUE: 21
PIF_VALUE: 22

## 2019-08-22 ASSESSMENT — PAIN SCALES - GENERAL
PAINLEVEL_OUTOF10: 6
PAINLEVEL_OUTOF10: 10
PAINLEVEL_OUTOF10: 7
PAINLEVEL_OUTOF10: 8
PAINLEVEL_OUTOF10: 8
PAINLEVEL_OUTOF10: 0
PAINLEVEL_OUTOF10: 8
PAINLEVEL_OUTOF10: 0
PAINLEVEL_OUTOF10: 10
PAINLEVEL_OUTOF10: 0

## 2019-08-22 ASSESSMENT — PAIN DESCRIPTION - PAIN TYPE: TYPE: SURGICAL PAIN

## 2019-08-22 ASSESSMENT — PAIN DESCRIPTION - LOCATION: LOCATION: LEG

## 2019-08-22 ASSESSMENT — PAIN DESCRIPTION - ORIENTATION: ORIENTATION: LEFT;INNER

## 2019-08-22 ASSESSMENT — PAIN - FUNCTIONAL ASSESSMENT: PAIN_FUNCTIONAL_ASSESSMENT: 0-10

## 2019-08-22 NOTE — PLAN OF CARE
Problem: Infection:  Goal: Will remain free from infection  Description  Will remain free from infection  Outcome: Ongoing     Problem: Safety:  Goal: Free from accidental physical injury  Description  Free from accidental physical injury  Outcome: Ongoing

## 2019-08-22 NOTE — BRIEF OP NOTE
Brief Postoperative Note  ______________________________________________________________    Patient: Rosalio Alcaraz  YOB: 1940  MRN: [id number]  Date of Procedure: 8/22/2019    Pre-Op Diagnosis: ATHEROSCLEROSIS OF NATIVE ARTERIES OF EXTREMITIES WITH CLAUDICATION BILATERAL LEGS I70.213    Post-Op Diagnosis: Same       Procedure(s): FEMORAL TO FEMORAL BYPASS  LEFT FEMORAL TO POPLITEAL BYPASS GRAFT    Anesthesia: General    Surgeon(s):  Tatum Alfred MD    Assistant: Conrad Goodell    Estimated Blood Loss (mL): 244     Complications: None    Specimens:   * No specimens in log *    Implants:  Implant Name Type Inv.  Item Serial No.  Lot No. LRB No. Used   GRAFT VASC STD WALL 40CM 55CM 15CM Vascular/Graft/Patch/Filter GRAFT VASC STD WALL 40CM 55CM 15CM   [employer] [id number] N/A 1         Drains:   Urethral Catheter Temperature probe 16 fr (Active)       Tatum Alfred MD  Date: 8/22/2019  Time: 3:24 PM

## 2019-08-22 NOTE — H&P
Take 40 mg by mouth daily 1-2 qd 4/18/19   Sylvia Cosby MD   vitamin D (ERGOCALCIFEROL) 92914 units CAPS capsule Take 1 capsule by mouth once a week 4/24/18   Daniel Lassiter MD   nitroGLYCERIN (NITROLINGUAL) 0.4 MG/SPRAY 0.4 mg spray Place 1 spray under the tongue every 5 minutes as needed for Chest pain 3/29/18   [place], [address] Meds:    Current Facility-Administered Medications   Medication Dose Route Frequency Provider Last Rate Last Dose    ceFAZolin (ANCEF) 2 g in dextrose 4 % 100 mL IVPB (premix)  2 g Intravenous On Call to [address] [place] [address], APRN - CNP        sodium chloride flush 0.9 % injection 10 mL  10 mL Intravenous 2 times per day Tracie Standing, APRN - CNP        sodium chloride flush 0.9 % injection 10 mL  10 mL Intravenous PRN Tracie Standing, APRN - CNP        0.9 % sodium chloride infusion   Intravenous Continuous Mahamed Gomez MD 75 mL/hr at 08/22/19 1138      sodium chloride flush 0.9 % injection 10 mL  10 mL Intravenous 2 times per day Mahamed Gomez MD        sodium chloride flush 0.9 % injection 10 mL  10 mL Intravenous PRN Mahamed Gomez MD        HYDROmorphone (DILAUDID) injection 0.25 mg  0.25 mg Intravenous Q5 Min PRN Mahamed Gomez MD        fentaNYL (SUBLIMAZE) injection 50 mcg  50 mcg Intravenous Q5 Min PRN MD Manuel Angelmorphone (DILAUDID) injection 0.25 mg  0.25 mg Intravenous Q5 Min PRN Mahamed Gomez MD        HYDROmorphone (DILAUDID) injection 0.5 mg  0.5 mg Intravenous Q5 Min PRN Mahamed Gomez MD        oxyCODONE (ROXICODONE) immediate release tablet 5 mg  5 mg Oral PRN Mahamed Gomez MD        Or    oxyCODONE (ROXICODONE) immediate release tablet 10 mg  10 mg Oral PRN Mahamed Gomez MD        diphenhydrAMINE (BENADRYL) injection 12.5 mg  12.5 mg Intravenous Once PRN Mahamed Gomez MD        promethazine (PHENERGAN) injection 6.25 mg  6.25 mg Intravenous PRN Ethsandraen [place], MD        labetalol (NORMODYNE;TRANDATE) injection 5 mg  5 mg Intravenous Q10 Min PRN Reza Humphreys MD        meperidine (DEMEROL) injection 12.5 mg  12.5 mg Intravenous Q5 Min PRN Reza Humphreys MD           Social History:       Social History     Socioeconomic History    Marital status:      Spouse name: Toby Giron Number of children: 3    Years of education: 12    Highest education level: None   Occupational History    None   Social Needs    Financial resource strain: None    Food insecurity:     Worry: None     Inability: None    Transportation needs:     Medical: None     Non-medical: None   Tobacco Use    Smoking status: Former Smoker     Packs/day: 1.00     Years: 28.00     Pack years: 28.00     Types: Cigarettes     Last attempt to quit: 1987     Years since quittin.6    Smokeless tobacco: Never Used    Tobacco comment: H.O.smoking at age 15 / smoked up to 1 p.p.d / quit    Substance and Sexual Activity    Alcohol use:  Yes     Alcohol/week: 0.0 standard drinks     Comment: rare    Drug use: No    Sexual activity: None   Lifestyle    Physical activity:     Days per week: None     Minutes per session: None    Stress: None   Relationships    Social connections:     Talks on phone: None     Gets together: None     Attends Scientologist service: None     Active member of club or organization: None     Attends meetings of clubs or organizations: None     Relationship status: None    Intimate partner violence:     Fear of current or ex partner: None     Emotionally abused: None     Physically abused: None     Forced sexual activity: None   Other Topics Concern    None   Social History Narrative    None       Family Histroy:      Family History   Problem Relation Age of Onset    Cancer Sister     Heart Disease Sister     Diabetes Brother     Hypertension Brother     Heart Disease Brother     Stroke Maternal Aunt     Heart Disease Maternal Aunt     Diabetes Maternal Uncle    

## 2019-08-22 NOTE — ANESTHESIA POSTPROCEDURE EVALUATION
Department of Anesthesiology  Postprocedure Note    Patient: Johnie Meigs  MRN: [id number]  YOB: 1940  Date of evaluation: 8/22/2019  Time:  5:03 PM     Procedure Summary     Date:  08/22/19 Room / Location:  [facility] OR 03 / [facility] OR    Anesthesia Start:  1230 Anesthesia Stop:  1601    Procedures:       FEMORAL TO FEMORAL BYPASS (N/A )      LEFT FEMORAL TO POPLITEAL BYPASS GRAFT (Left ) Diagnosis:  (ATHEROSCLEROSIS OF NATIVE ARTERIES OF EXTREMITIES WITH CLAUDICATION BILATERAL LEGS I70.213)    Surgeon:  Gonsalo Yoder MD Responsible Provider:  Eze Duenas MD    Anesthesia Type:  general ASA Status:  4          Anesthesia Type: general    Tess Phase I: Tess Score: 8    Tess Phase II:      Last vitals: Reviewed and per EMR flowsheets.        Anesthesia Post Evaluation    Patient location during evaluation: PACU  Patient participation: complete - patient participated  Level of consciousness: awake and alert  Airway patency: patent  Nausea & Vomiting: no nausea and no vomiting  Complications: no  Cardiovascular status: hemodynamically stable  Respiratory status: acceptable  Hydration status: stable

## 2019-08-23 PROBLEM — I73.9 PVD (PERIPHERAL VASCULAR DISEASE) (HCC): Status: ACTIVE | Noted: 2019-08-23

## 2019-08-23 LAB
BASOPHILS ABSOLUTE: 0.1 K/UL (ref 0–0.2)
BASOPHILS RELATIVE PERCENT: 1.1 %
EOSINOPHILS ABSOLUTE: 0.2 K/UL (ref 0–0.6)
EOSINOPHILS RELATIVE PERCENT: 1.2 %
HCT VFR BLD CALC: 38.8 % (ref 36–48)
HEMOGLOBIN: 12 G/DL (ref 12–16)
LYMPHOCYTES ABSOLUTE: 0.9 K/UL (ref 1–5.1)
LYMPHOCYTES RELATIVE PERCENT: 6.8 %
MCH RBC QN AUTO: 25.6 PG (ref 26–34)
MCHC RBC AUTO-ENTMCNC: 31 G/DL (ref 31–36)
MCV RBC AUTO: 82.8 FL (ref 80–100)
MONOCYTES ABSOLUTE: 0.9 K/UL (ref 0–1.3)
MONOCYTES RELATIVE PERCENT: 6.8 %
NEUTROPHILS ABSOLUTE: 11.1 K/UL (ref 1.7–7.7)
NEUTROPHILS RELATIVE PERCENT: 84.1 %
PDW BLD-RTO: 18.3 % (ref 12.4–15.4)
PLATELET # BLD: 266 K/UL (ref 135–450)
PMV BLD AUTO: 8.9 FL (ref 5–10.5)
RBC # BLD: 4.68 M/UL (ref 4–5.2)
WBC # BLD: 13.2 K/UL (ref 4–11)

## 2019-08-23 PROCEDURE — 2000000000 HC ICU R&B

## 2019-08-23 PROCEDURE — 6370000000 HC RX 637 (ALT 250 FOR IP): Performed by: SURGERY

## 2019-08-23 PROCEDURE — 85025 COMPLETE CBC W/AUTO DIFF WBC: CPT

## 2019-08-23 PROCEDURE — 94760 N-INVAS EAR/PLS OXIMETRY 1: CPT

## 2019-08-23 PROCEDURE — 6360000002 HC RX W HCPCS: Performed by: SURGERY

## 2019-08-23 PROCEDURE — 99024 POSTOP FOLLOW-UP VISIT: CPT | Performed by: SURGERY

## 2019-08-23 PROCEDURE — 2580000003 HC RX 258: Performed by: SURGERY

## 2019-08-23 RX ORDER — ROSUVASTATIN CALCIUM 10 MG/1
5 TABLET, COATED ORAL NIGHTLY
Status: DISCONTINUED | OUTPATIENT
Start: 2019-08-23 | End: 2019-08-26 | Stop reason: HOSPADM

## 2019-08-23 RX ORDER — MECLIZINE HCL 12.5 MG/1
12.5 TABLET ORAL 3 TIMES DAILY PRN
Status: DISCONTINUED | OUTPATIENT
Start: 2019-08-23 | End: 2019-08-26 | Stop reason: HOSPADM

## 2019-08-23 RX ORDER — ALLOPURINOL 300 MG/1
300 TABLET ORAL DAILY
Status: DISCONTINUED | OUTPATIENT
Start: 2019-08-23 | End: 2019-08-26 | Stop reason: HOSPADM

## 2019-08-23 RX ORDER — TORSEMIDE 20 MG/1
40 TABLET ORAL DAILY
Status: DISCONTINUED | OUTPATIENT
Start: 2019-08-23 | End: 2019-08-26 | Stop reason: HOSPADM

## 2019-08-23 RX ORDER — LEVOTHYROXINE SODIUM 112 UG/1
112 TABLET ORAL DAILY
Status: DISCONTINUED | OUTPATIENT
Start: 2019-08-23 | End: 2019-08-26 | Stop reason: HOSPADM

## 2019-08-23 RX ORDER — AMLODIPINE BESYLATE 5 MG/1
10 TABLET ORAL DAILY
Status: DISCONTINUED | OUTPATIENT
Start: 2019-08-23 | End: 2019-08-26 | Stop reason: HOSPADM

## 2019-08-23 RX ORDER — ALLOPURINOL 100 MG/1
100 TABLET ORAL DAILY
Status: DISCONTINUED | OUTPATIENT
Start: 2019-08-23 | End: 2019-08-26 | Stop reason: HOSPADM

## 2019-08-23 RX ORDER — METOPROLOL TARTRATE 50 MG/1
100 TABLET, FILM COATED ORAL 2 TIMES DAILY
Status: DISCONTINUED | OUTPATIENT
Start: 2019-08-23 | End: 2019-08-26 | Stop reason: HOSPADM

## 2019-08-23 RX ORDER — TOPIRAMATE 25 MG/1
100 TABLET ORAL 2 TIMES DAILY
Status: DISCONTINUED | OUTPATIENT
Start: 2019-08-23 | End: 2019-08-26 | Stop reason: HOSPADM

## 2019-08-23 RX ORDER — ACETAMINOPHEN 10 MG/ML
1000 INJECTION, SOLUTION INTRAVENOUS EVERY 6 HOURS PRN
Status: DISCONTINUED | OUTPATIENT
Start: 2019-08-23 | End: 2019-08-24

## 2019-08-23 RX ORDER — ERGOCALCIFEROL 1.25 MG/1
50000 CAPSULE ORAL WEEKLY
Status: DISCONTINUED | OUTPATIENT
Start: 2019-08-23 | End: 2019-08-26 | Stop reason: HOSPADM

## 2019-08-23 RX ORDER — GABAPENTIN 100 MG/1
200 CAPSULE ORAL NIGHTLY
Status: DISCONTINUED | OUTPATIENT
Start: 2019-08-23 | End: 2019-08-26 | Stop reason: HOSPADM

## 2019-08-23 RX ORDER — CLOPIDOGREL BISULFATE 75 MG/1
75 TABLET ORAL DAILY
Status: DISCONTINUED | OUTPATIENT
Start: 2019-08-23 | End: 2019-08-26 | Stop reason: HOSPADM

## 2019-08-23 RX ADMIN — MORPHINE SULFATE 2 MG: 2 INJECTION, SOLUTION INTRAMUSCULAR; INTRAVENOUS at 02:02

## 2019-08-23 RX ADMIN — OXYCODONE HYDROCHLORIDE AND ACETAMINOPHEN 1 TABLET: 5; 325 TABLET ORAL at 04:25

## 2019-08-23 RX ADMIN — MORPHINE SULFATE 2 MG: 2 INJECTION, SOLUTION INTRAMUSCULAR; INTRAVENOUS at 21:32

## 2019-08-23 RX ADMIN — Medication 10 ML: at 21:32

## 2019-08-23 RX ADMIN — TOPIRAMATE 100 MG: 25 TABLET, FILM COATED ORAL at 21:32

## 2019-08-23 RX ADMIN — MORPHINE SULFATE 2 MG: 2 INJECTION, SOLUTION INTRAMUSCULAR; INTRAVENOUS at 12:14

## 2019-08-23 RX ADMIN — CEFAZOLIN SODIUM 2 G: 2 INJECTION, SOLUTION INTRAVENOUS at 04:24

## 2019-08-23 RX ADMIN — ROSUVASTATIN CALCIUM 5 MG: 10 TABLET, FILM COATED ORAL at 21:31

## 2019-08-23 RX ADMIN — Medication 10 ML: at 12:14

## 2019-08-23 RX ADMIN — OXYCODONE HYDROCHLORIDE AND ACETAMINOPHEN 1 TABLET: 5; 325 TABLET ORAL at 17:51

## 2019-08-23 RX ADMIN — OXYCODONE HYDROCHLORIDE AND ACETAMINOPHEN 1 TABLET: 5; 325 TABLET ORAL at 10:14

## 2019-08-23 RX ADMIN — ACETAMINOPHEN 1000 MG: 10 INJECTION, SOLUTION INTRAVENOUS at 14:58

## 2019-08-23 RX ADMIN — OXYCODONE HYDROCHLORIDE AND ACETAMINOPHEN 1 TABLET: 5; 325 TABLET ORAL at 14:17

## 2019-08-23 RX ADMIN — ACETAMINOPHEN 1000 MG: 10 INJECTION, SOLUTION INTRAVENOUS at 21:46

## 2019-08-23 RX ADMIN — METOPROLOL TARTRATE 100 MG: 50 TABLET, FILM COATED ORAL at 21:31

## 2019-08-23 RX ADMIN — ONDANSETRON 4 MG: 2 INJECTION INTRAMUSCULAR; INTRAVENOUS at 21:32

## 2019-08-23 RX ADMIN — GABAPENTIN 200 MG: 100 CAPSULE ORAL at 21:31

## 2019-08-23 ASSESSMENT — PAIN DESCRIPTION - LOCATION
LOCATION: LEG
LOCATION: LEG

## 2019-08-23 ASSESSMENT — PAIN SCALES - GENERAL
PAINLEVEL_OUTOF10: 6
PAINLEVEL_OUTOF10: 7
PAINLEVEL_OUTOF10: 0
PAINLEVEL_OUTOF10: 8
PAINLEVEL_OUTOF10: 10
PAINLEVEL_OUTOF10: 8
PAINLEVEL_OUTOF10: 8
PAINLEVEL_OUTOF10: 0
PAINLEVEL_OUTOF10: 8
PAINLEVEL_OUTOF10: 0
PAINLEVEL_OUTOF10: 6
PAINLEVEL_OUTOF10: 10
PAINLEVEL_OUTOF10: 8
PAINLEVEL_OUTOF10: 7
PAINLEVEL_OUTOF10: 6

## 2019-08-23 ASSESSMENT — PAIN DESCRIPTION - ORIENTATION
ORIENTATION: LEFT
ORIENTATION: LEFT

## 2019-08-23 ASSESSMENT — PAIN DESCRIPTION - PAIN TYPE
TYPE: SURGICAL PAIN
TYPE: SURGICAL PAIN

## 2019-08-23 NOTE — PROGRESS NOTES
4 Eyes Skin Assessment     The patient is being assess for  Post-Op Surgical    I agree that 2 RN's have performed a thorough Head to Toe Skin Assessment on the patient. ALL assessment sites listed below have been assessed. Areas assessed by both nurses: DAJUAN Langley and KAZ Cisneros  [x]   Head, Face, and Ears   [x]   Shoulders, Back, and Chest  [x]   Arms, Elbows, and Hands   [x]   Coccyx, Sacrum, and IschIum  [x]   Legs, Feet, and Heels        Does the Patient have Skin Breakdown?   No         Conor Prevention initiated:  Yes   Wound Care Orders initiated:  NA      [facility] nurse consulted for Pressure Injury (Stage 3,4, Unstageable, DTI, NWPT, and Complex wounds), New and Established Ostomies:  NA      Nurse 1 eSignature: Electronically signed by Nate Haskins RN on 8/23/19 at 9:21 AM    **SHARE this note so that the co-signing nurse is able to place an eSignature**    Nurse 2 eSignature: Electronically signed by Dewey Paulson RN on 8/23/19 at 9:24 AM

## 2019-08-24 LAB
ANION GAP SERPL CALCULATED.3IONS-SCNC: 10 MMOL/L (ref 3–16)
BUN BLDV-MCNC: 22 MG/DL (ref 7–20)
CALCIUM SERPL-MCNC: 8.3 MG/DL (ref 8.3–10.6)
CHLORIDE BLD-SCNC: 109 MMOL/L (ref 99–110)
CO2: 20 MMOL/L (ref 21–32)
CREAT SERPL-MCNC: 1.2 MG/DL (ref 0.6–1.2)
GFR AFRICAN AMERICAN: 52
GFR NON-AFRICAN AMERICAN: 43
GLUCOSE BLD-MCNC: 137 MG/DL (ref 70–99)
POTASSIUM SERPL-SCNC: 4.1 MMOL/L (ref 3.5–5.1)
SODIUM BLD-SCNC: 139 MMOL/L (ref 136–145)

## 2019-08-24 PROCEDURE — 6370000000 HC RX 637 (ALT 250 FOR IP): Performed by: SURGERY

## 2019-08-24 PROCEDURE — 94760 N-INVAS EAR/PLS OXIMETRY 1: CPT

## 2019-08-24 PROCEDURE — 2580000003 HC RX 258: Performed by: SURGERY

## 2019-08-24 PROCEDURE — 99024 POSTOP FOLLOW-UP VISIT: CPT | Performed by: SURGERY

## 2019-08-24 PROCEDURE — 80048 BASIC METABOLIC PNL TOTAL CA: CPT

## 2019-08-24 PROCEDURE — 2000000000 HC ICU R&B

## 2019-08-24 RX ORDER — HYDROCODONE BITARTRATE AND ACETAMINOPHEN 5; 325 MG/1; MG/1
2 TABLET ORAL EVERY 4 HOURS PRN
Status: DISCONTINUED | OUTPATIENT
Start: 2019-08-24 | End: 2019-08-26 | Stop reason: HOSPADM

## 2019-08-24 RX ORDER — HYDROCODONE BITARTRATE AND ACETAMINOPHEN 5; 325 MG/1; MG/1
1 TABLET ORAL EVERY 4 HOURS PRN
Status: DISCONTINUED | OUTPATIENT
Start: 2019-08-24 | End: 2019-08-26 | Stop reason: HOSPADM

## 2019-08-24 RX ADMIN — ALLOPURINOL 300 MG: 300 TABLET ORAL at 20:12

## 2019-08-24 RX ADMIN — AMLODIPINE BESYLATE 10 MG: 5 TABLET ORAL at 09:50

## 2019-08-24 RX ADMIN — TORSEMIDE 40 MG: 20 TABLET ORAL at 09:50

## 2019-08-24 RX ADMIN — Medication 10 ML: at 09:51

## 2019-08-24 RX ADMIN — TOPIRAMATE 100 MG: 25 TABLET, FILM COATED ORAL at 20:13

## 2019-08-24 RX ADMIN — SODIUM CHLORIDE, POTASSIUM CHLORIDE, SODIUM LACTATE AND CALCIUM CHLORIDE: 600; 310; 30; 20 INJECTION, SOLUTION INTRAVENOUS at 00:26

## 2019-08-24 RX ADMIN — TOPIRAMATE 100 MG: 25 TABLET, FILM COATED ORAL at 09:50

## 2019-08-24 RX ADMIN — GABAPENTIN 200 MG: 100 CAPSULE ORAL at 20:12

## 2019-08-24 RX ADMIN — HYDROCODONE BITARTRATE AND ACETAMINOPHEN 2 TABLET: 5; 325 TABLET ORAL at 20:12

## 2019-08-24 RX ADMIN — CLOPIDOGREL 75 MG: 75 TABLET, FILM COATED ORAL at 09:51

## 2019-08-24 RX ADMIN — METOPROLOL TARTRATE 100 MG: 50 TABLET, FILM COATED ORAL at 20:13

## 2019-08-24 RX ADMIN — HYDROCODONE BITARTRATE AND ACETAMINOPHEN 1 TABLET: 5; 325 TABLET ORAL at 15:16

## 2019-08-24 RX ADMIN — METOPROLOL TARTRATE 100 MG: 50 TABLET, FILM COATED ORAL at 09:50

## 2019-08-24 RX ADMIN — Medication 10 ML: at 20:14

## 2019-08-24 RX ADMIN — SODIUM CHLORIDE, POTASSIUM CHLORIDE, SODIUM LACTATE AND CALCIUM CHLORIDE: 600; 310; 30; 20 INJECTION, SOLUTION INTRAVENOUS at 01:31

## 2019-08-24 RX ADMIN — OXYCODONE HYDROCHLORIDE AND ACETAMINOPHEN 1 TABLET: 5; 325 TABLET ORAL at 00:24

## 2019-08-24 RX ADMIN — ALLOPURINOL 100 MG: 100 TABLET ORAL at 20:14

## 2019-08-24 RX ADMIN — ACETAMINOPHEN 650 MG: 325 TABLET, FILM COATED ORAL at 09:50

## 2019-08-24 RX ADMIN — HYDROCODONE BITARTRATE AND ACETAMINOPHEN 1 TABLET: 5; 325 TABLET ORAL at 13:36

## 2019-08-24 RX ADMIN — RIVAROXABAN 15 MG: 15 TABLET, FILM COATED ORAL at 19:50

## 2019-08-24 RX ADMIN — LEVOTHYROXINE SODIUM 112 MCG: 112 TABLET ORAL at 09:51

## 2019-08-24 RX ADMIN — ROSUVASTATIN CALCIUM 5 MG: 10 TABLET, FILM COATED ORAL at 20:13

## 2019-08-24 ASSESSMENT — PAIN DESCRIPTION - ORIENTATION
ORIENTATION: LEFT

## 2019-08-24 ASSESSMENT — PAIN DESCRIPTION - LOCATION
LOCATION: LEG

## 2019-08-24 ASSESSMENT — PAIN SCALES - GENERAL
PAINLEVEL_OUTOF10: 10
PAINLEVEL_OUTOF10: 10
PAINLEVEL_OUTOF10: 8
PAINLEVEL_OUTOF10: 7
PAINLEVEL_OUTOF10: 1
PAINLEVEL_OUTOF10: 7
PAINLEVEL_OUTOF10: 6
PAINLEVEL_OUTOF10: 7
PAINLEVEL_OUTOF10: 10
PAINLEVEL_OUTOF10: 0
PAINLEVEL_OUTOF10: 7

## 2019-08-24 ASSESSMENT — PAIN DESCRIPTION - DESCRIPTORS
DESCRIPTORS: BURNING;DISCOMFORT
DESCRIPTORS: ACHING;BURNING;DISCOMFORT
DESCRIPTORS: BURNING;DISCOMFORT

## 2019-08-24 ASSESSMENT — PAIN DESCRIPTION - PAIN TYPE
TYPE: SURGICAL PAIN

## 2019-08-24 ASSESSMENT — PAIN DESCRIPTION - ONSET
ONSET: ON-GOING
ONSET: ON-GOING

## 2019-08-24 NOTE — PLAN OF CARE
Problem: Pain:  Goal: Patient's pain/discomfort is manageable  Description  Patient's pain/discomfort is manageable  Outcome: Ongoing   Pt alert and oriented. Pt able to communicate present pain and use the pain scale appropriately. Nonpharmacological pain reducers and pain medication offered as needed. Will cont to monitor.

## 2019-08-24 NOTE — PROGRESS NOTES
BMP drawn and sent to lab as ordered per Dr Alex Peñaloza. Spoke with patient about relaxing left leg to help decrease pain.  Dr Alex Peñaloza ordered [address] [facility] since patient takes at home

## 2019-08-24 NOTE — PROGRESS NOTES
Attempted to get patient out of bed and to bathroom, with 2 RN's patient is unable at this time to bear weight on left leg, or pivot, stand or walk. Was able to get patient to [facility], to void but patient unable to sit up on BSC, due to pain.  Pain meds given as ordered, will use bedpan to void till MD rounds in am.  [facility]

## 2019-08-25 PROCEDURE — 2000000000 HC ICU R&B

## 2019-08-25 PROCEDURE — 6370000000 HC RX 637 (ALT 250 FOR IP): Performed by: SURGERY

## 2019-08-25 PROCEDURE — 2580000003 HC RX 258: Performed by: SURGERY

## 2019-08-25 PROCEDURE — 99024 POSTOP FOLLOW-UP VISIT: CPT | Performed by: SURGERY

## 2019-08-25 PROCEDURE — 94760 N-INVAS EAR/PLS OXIMETRY 1: CPT

## 2019-08-25 RX ORDER — TOPIRAMATE 25 MG/1
TABLET ORAL
Status: DISPENSED
Start: 2019-08-25 | End: 2019-08-25

## 2019-08-25 RX ADMIN — HYDROCODONE BITARTRATE AND ACETAMINOPHEN 2 TABLET: 5; 325 TABLET ORAL at 09:35

## 2019-08-25 RX ADMIN — TOPIRAMATE 100 MG: 25 TABLET, FILM COATED ORAL at 09:34

## 2019-08-25 RX ADMIN — GABAPENTIN 200 MG: 100 CAPSULE ORAL at 21:04

## 2019-08-25 RX ADMIN — METOPROLOL TARTRATE 100 MG: 50 TABLET, FILM COATED ORAL at 09:35

## 2019-08-25 RX ADMIN — AMLODIPINE BESYLATE 10 MG: 5 TABLET ORAL at 09:33

## 2019-08-25 RX ADMIN — ALLOPURINOL 300 MG: 300 TABLET ORAL at 21:05

## 2019-08-25 RX ADMIN — CLOPIDOGREL 75 MG: 75 TABLET, FILM COATED ORAL at 09:35

## 2019-08-25 RX ADMIN — TOPIRAMATE 100 MG: 25 TABLET, FILM COATED ORAL at 21:00

## 2019-08-25 RX ADMIN — HYDROCODONE BITARTRATE AND ACETAMINOPHEN 2 TABLET: 5; 325 TABLET ORAL at 01:56

## 2019-08-25 RX ADMIN — ROSUVASTATIN CALCIUM 5 MG: 10 TABLET, FILM COATED ORAL at 21:00

## 2019-08-25 RX ADMIN — LEVOTHYROXINE SODIUM 112 MCG: 112 TABLET ORAL at 09:33

## 2019-08-25 RX ADMIN — Medication 10 ML: at 09:36

## 2019-08-25 RX ADMIN — TORSEMIDE 40 MG: 20 TABLET ORAL at 09:35

## 2019-08-25 RX ADMIN — HYDROCODONE BITARTRATE AND ACETAMINOPHEN 1 TABLET: 5; 325 TABLET ORAL at 21:13

## 2019-08-25 RX ADMIN — METOPROLOL TARTRATE 100 MG: 50 TABLET, FILM COATED ORAL at 21:04

## 2019-08-25 RX ADMIN — Medication 10 ML: at 21:08

## 2019-08-25 RX ADMIN — ALLOPURINOL 100 MG: 100 TABLET ORAL at 21:06

## 2019-08-25 RX ADMIN — RIVAROXABAN 15 MG: 15 TABLET, FILM COATED ORAL at 18:44

## 2019-08-25 ASSESSMENT — PAIN SCALES - GENERAL
PAINLEVEL_OUTOF10: 4
PAINLEVEL_OUTOF10: 2
PAINLEVEL_OUTOF10: 4
PAINLEVEL_OUTOF10: 0
PAINLEVEL_OUTOF10: 7
PAINLEVEL_OUTOF10: 7
PAINLEVEL_OUTOF10: 4

## 2019-08-25 ASSESSMENT — PAIN DESCRIPTION - PAIN TYPE
TYPE: SURGICAL PAIN

## 2019-08-25 ASSESSMENT — PAIN DESCRIPTION - ONSET: ONSET: ON-GOING

## 2019-08-25 ASSESSMENT — PAIN DESCRIPTION - LOCATION
LOCATION: LEG

## 2019-08-25 ASSESSMENT — PAIN DESCRIPTION - ORIENTATION
ORIENTATION: LEFT

## 2019-08-26 VITALS
SYSTOLIC BLOOD PRESSURE: 117 MMHG | HEIGHT: 63 IN | HEART RATE: 79 BPM | WEIGHT: 147.93 LBS | DIASTOLIC BLOOD PRESSURE: 48 MMHG | BODY MASS INDEX: 26.21 KG/M2 | OXYGEN SATURATION: 98 % | RESPIRATION RATE: 18 BRPM | TEMPERATURE: 97 F

## 2019-08-26 PROCEDURE — 2580000003 HC RX 258: Performed by: SURGERY

## 2019-08-26 PROCEDURE — 6370000000 HC RX 637 (ALT 250 FOR IP): Performed by: SURGERY

## 2019-08-26 PROCEDURE — 97530 THERAPEUTIC ACTIVITIES: CPT

## 2019-08-26 PROCEDURE — 97161 PT EVAL LOW COMPLEX 20 MIN: CPT

## 2019-08-26 PROCEDURE — 97116 GAIT TRAINING THERAPY: CPT

## 2019-08-26 PROCEDURE — APPSS30 APP SPLIT SHARED TIME 16-30 MINUTES: Performed by: NURSE PRACTITIONER

## 2019-08-26 RX ORDER — GABAPENTIN 100 MG/1
CAPSULE ORAL
Qty: 90 CAPSULE | Refills: 0 | Status: SHIPPED | OUTPATIENT
Start: 2019-08-26 | End: 2019-08-27 | Stop reason: SDUPTHER

## 2019-08-26 RX ORDER — DOCUSATE SODIUM 100 MG/1
100 CAPSULE, LIQUID FILLED ORAL DAILY
Status: DISCONTINUED | OUTPATIENT
Start: 2019-08-26 | End: 2019-08-26 | Stop reason: HOSPADM

## 2019-08-26 RX ORDER — PSEUDOEPHEDRINE HCL 30 MG
100 TABLET ORAL DAILY
Qty: 30 CAPSULE | Refills: 0 | Status: SHIPPED | OUTPATIENT
Start: 2019-08-26 | End: 2019-09-30

## 2019-08-26 RX ORDER — HYDROCODONE BITARTRATE AND ACETAMINOPHEN 5; 325 MG/1; MG/1
1 TABLET ORAL EVERY 6 HOURS PRN
Qty: 28 TABLET | Refills: 0 | Status: SHIPPED | OUTPATIENT
Start: 2019-08-26 | End: 2019-08-27

## 2019-08-26 RX ADMIN — HYDROCODONE BITARTRATE AND ACETAMINOPHEN 1 TABLET: 5; 325 TABLET ORAL at 13:26

## 2019-08-26 RX ADMIN — RIVAROXABAN 15 MG: 15 TABLET, FILM COATED ORAL at 13:26

## 2019-08-26 RX ADMIN — LEVOTHYROXINE SODIUM 112 MCG: 112 TABLET ORAL at 08:50

## 2019-08-26 RX ADMIN — TORSEMIDE 40 MG: 20 TABLET ORAL at 08:49

## 2019-08-26 RX ADMIN — HYDROCODONE BITARTRATE AND ACETAMINOPHEN 1 TABLET: 5; 325 TABLET ORAL at 08:50

## 2019-08-26 RX ADMIN — HYDROCODONE BITARTRATE AND ACETAMINOPHEN 1 TABLET: 5; 325 TABLET ORAL at 04:25

## 2019-08-26 RX ADMIN — Medication 10 ML: at 08:51

## 2019-08-26 RX ADMIN — CLOPIDOGREL 75 MG: 75 TABLET, FILM COATED ORAL at 08:50

## 2019-08-26 RX ADMIN — AMLODIPINE BESYLATE 10 MG: 5 TABLET ORAL at 08:49

## 2019-08-26 RX ADMIN — METOPROLOL TARTRATE 100 MG: 50 TABLET, FILM COATED ORAL at 08:50

## 2019-08-26 RX ADMIN — TOPIRAMATE 100 MG: 25 TABLET, FILM COATED ORAL at 08:51

## 2019-08-26 ASSESSMENT — PAIN SCALES - GENERAL
PAINLEVEL_OUTOF10: 5
PAINLEVEL_OUTOF10: 4
PAINLEVEL_OUTOF10: 6
PAINLEVEL_OUTOF10: 8

## 2019-08-26 ASSESSMENT — PAIN DESCRIPTION - LOCATION: LOCATION: LEG

## 2019-08-26 ASSESSMENT — PAIN DESCRIPTION - ORIENTATION: ORIENTATION: LEFT

## 2019-08-26 ASSESSMENT — PAIN DESCRIPTION - DESCRIPTORS: DESCRIPTORS: ACHING;BURNING

## 2019-08-26 ASSESSMENT — PAIN DESCRIPTION - PAIN TYPE: TYPE: SURGICAL PAIN

## 2019-08-26 NOTE — DISCHARGE INSTR - COC
Continuity of Care Form    Patient Name: Fernando Solares   :  1940  MRN:  [id number]    Admit date:  2019  Discharge date:  2019    Code Status Order: Full Code   Advance Directives:   Advance Care Flowsheet Documentation     Date/Time Healthcare Directive Type of Healthcare Directive Copy in [address] Agent's Name Healthcare Agent's Phone Number    19 1711  No, patient does not have an advance directive for healthcare treatment -- -- -- -- --          Admitting Physician:  Pj Milian MD  PCP: Peter Valentin MD    Discharging Nurse: [facility] Unit/Room#: CVU-2902/2902-01  Discharging Unit Phone Number: [phone]    Emergency Contact:   Extended Emergency Contact Information  Primary Emergency Contact: Angel Cordero  Address: [place] [address] [address]  Phone: [phone]  Relation: Spouse  Secondary Emergency Contact: William Oli  Address: [address] [facility] Phone: [phone]  Mobile Phone: [phone]  Relation: Child    Past Surgical History:  Past Surgical History:   Procedure Laterality Date    [address]  2012    CARDIAC CATHETERIZATION  2018    Unsuccesful  of RCA    CARDIAC SURGERY      CABG & Cardiac ablation    CHOLECYSTECTOMY      CORONARY ARTERY BYPASS GRAFT  1987    LIMA- Diag/LAD, SVG- RCA    FEMORAL BYPASS Left 2019    LEFT FEMORAL TO POPLITEAL BYPASS GRAFT performed by Pj Milian MD at [address] [facility] FEMORAL-FEMORAL BYPASS GRAFT N/A 2019    FEMORAL TO FEMORAL BYPASS performed by Pj Milian MD at [address] [place] [address] Left 2017    Left hip pinning    JOINT REPLACEMENT      PA INJECT ANES/STEROID FORAMEN LUMBAR/SACRAL W IMG GUIDE ,1 LEVEL Right 12/3/2018    RIGHT L3 AND L4 LUMBAR TRANSFORAMINAL EPIRUAL STEROID INJECTION

## 2019-08-26 NOTE — PROGRESS NOTES
Discharge instructions reviewed with patient and family member. Patient and family verbalized understanding. All home medications have been reviewed, questions answered and patient voiced understanding. All medication side effects reviewed and patient and family verbalized understanding. Patient given prescriptions, discharge instructions, and appointment times. Patient discharged to home with family via private car. Taken to lobby via wheelchair. Follow up appointment(s) reviewed with patient and all attempts made to schedule within 7 days of discharge. [facility]  Depression Screen    1. During the past month, have you often been bothered by feeling down, depressed, or hopeless?   no    2. During the past month, have you often been bothered by little interest or pleasure in       doing things?    no

## 2019-08-26 NOTE — CARE COORDINATION
Discharge planning-    Per CVU team, PT/OT ordered, for recommendations re: disposition planning. Skip/ PT notified. Patient assessed by social work on 8/23, no discharge planning needs identified at that time. Will continue to follow. Addendum: Discussed with PT/OT, home care is being recommended. Anticipate discharge today. Called [place]/ [facility] with the home care referral. Discussed with Merna/ NP, who will place order for home care/ sign JUANI. Plan- Home with [facility].     Will continue to follow for support and discharge planning.    -Angus Hughes, MSW, LSW

## 2019-08-26 NOTE — PROGRESS NOTES
sit: Stand by assistance;Supervision  Ambulation  Ambulation?: Yes  Ambulation 1  Surface: level tile  Device: Rollator  Assistance: Stand by assistance;Supervision  Gait Deviations: Slow Aida;Decreased step height;Decreased step length  Distance: 12 ft, 200 ft, 100 ft  Comments: Pt SOB with short distance amb to bathroom; pt states this is her baseline; nursing made aware, Paulshayy Acuna; 1 seated rest break with walk around unit     Balance  Posture: Fair  Sitting - Static: Good  Sitting - Dynamic: Good  Standing - Static: Good;-  Standing - Dynamic: Good;-  Comments: pt requires UE support for ambulation  Exercises  Knee Long Arc Quad: 5x with AAROM LLE; pt able to fully extend LLE  Comments: Pt instructed in APs and [address]  Times per week: 3-5x  Times per day: Daily  Current Treatment Recommendations: Strengthening, ROM, Balance Training, Functional Mobility Training, Transfer Training, Gait Training, Safety Education & Training  Safety Devices  Type of devices:  All fall risk precautions in place, Call light within reach, Chair alarm in place, Gait belt, Patient at risk for falls, Left in chair, Nurse notified(LAQUITA Roland)    AM-PAC Score  AM-PAC Inpatient Mobility Raw Score : 18 (08/26/19 1129)  AM-PAC Inpatient T-Scale Score : 43.63 (08/26/19 1129)  Mobility Inpatient CMS 0-100% Score: 46.58 (08/26/19 1129)  Mobility Inpatient CMS G-Code Modifier : CK (08/26/19 1129)          Goals  Short term goals  Time Frame for Short term goals: discharge  Short term goal 1: Pt will perform bed mobility with supervision  Short term goal 2: Pt will transfer sit to/from stand with supervision  Short term goal 3: Pt will amb 300 ft with 3HP with supervision  Patient Goals   Patient goals : return home       Therapy Time   Individual Concurrent Group Co-treatment   Time In 1692         Time Out 1120         Minutes 45         Timed Code Treatment Minutes: 243 [facility], [facility], [facility]      Pt evaluated and discharged on same day 8/26/19 to home.     Clotilde Bronw, DPT [id number]

## 2019-08-26 NOTE — DISCHARGE SUMMARY
DISCHARGE SUMMARY      Patient ID:  Lorice Mcardle  [id number] 80 y.o. 1940      Admission Date:  8/22/2019 10:34 AM  Discharge Date:  8/26/2019    Principle Diagnosis:  Bilateral lower extremity claudication    Secondary Diagnosis:  Active Problems:    Atherosclerosis of native arteries of extremities with intermittent claudication, bilateral legs (HCC)    PVD (peripheral vascular disease) ([facility])  Resolved Problems:    * No resolved hospital problems. *    Patient Active Problem List   Diagnosis    Chronic atrial fibrillation (HCC)    Essential hypertension, benign    Mixed hyperlipidemia    Chronic gouty arthropathy    Acquired hypothyroidism    Arthritis    Non-rheumatic mitral regurgitation    Restrictive lung disease    Sick sinus syndrome (HCC)    Allergic rhinitis    Fibrocystic breast    Dizziness    Cerebrovascular accident (CVA) ([address]    Pacemaker    Typical atrial flutter ([address]    Primary osteoarthritis involving multiple joints    Vitamin D deficiency    Tremor    Chronic total occlusion of native coronary artery    Age related osteoporosis    Chronic systolic congestive heart failure (HCC)    SOB (shortness of breath)    Congestive heart failure with left ventricular dysfunction (HCC)    Chronic renal failure, stage 3 (moderate) ([facility])    PAD (peripheral artery disease) ([address]    Atherosclerosis of native arteries of extremities with intermittent claudication, bilateral legs (HCC)    PVD (peripheral vascular disease) ([address]        Consults:  IP CONSULT TO HOME CARE NEEDS    Procedure:  FEMORAL TO FEMORAL BYPASS and LEFT FEMORAL TO POPLITEAL BYPASS GRAFT    History:  The patient is a 66 y.o. female with history of hypothyroidism, hypertension, CVA, hyperlipidemia, CKD, CAD, PAF on Xarelto, CHF and PAD. Hospital Course:   The patient underwent femoral to femoral bypass and left femoral to popliteal bypass graft on 8/22/2019 10:34 AM.  Post-operatively she was struggling

## 2019-08-26 NOTE — PLAN OF CARE
Problem: Safety:  Goal: Free from accidental physical injury  Description  Free from accidental physical injury  Outcome: Met This Shift  Note:   Call light remains in reach, which pt uses appropriately, will continue to monitor. Problem: Pain:  Goal: Pain level will decrease  Description  Pain level will decrease  Outcome: Met This Shift  Note:   Pt states pain is less. Pt is able to ambulate better than previous day. Pt assisted to BR, able to ambulate slowly with assist x1.

## 2019-08-27 ENCOUNTER — CARE COORDINATION (OUTPATIENT)
Dept: CASE MANAGEMENT | Age: 79
End: 2019-08-27

## 2019-08-27 DIAGNOSIS — I70.213 ATHEROSCLEROSIS OF NATIVE ARTERIES OF EXTREMITIES WITH INTERMITTENT CLAUDICATION, BILATERAL LEGS (HCC): Primary | ICD-10-CM

## 2019-08-27 DIAGNOSIS — M15.9 PRIMARY OSTEOARTHRITIS INVOLVING MULTIPLE JOINTS: ICD-10-CM

## 2019-08-27 RX ORDER — HYDROCODONE BITARTRATE AND ACETAMINOPHEN 5; 325 MG/1; MG/1
1 TABLET ORAL 4 TIMES DAILY PRN
Qty: 120 TABLET | Refills: 0 | Status: SHIPPED | OUTPATIENT
Start: 2019-08-27 | End: 2019-08-27 | Stop reason: SDUPTHER

## 2019-08-27 RX ORDER — HYDROCODONE BITARTRATE AND ACETAMINOPHEN 5; 325 MG/1; MG/1
1 TABLET ORAL 4 TIMES DAILY PRN
Qty: 120 TABLET | Refills: 0 | Status: SHIPPED | OUTPATIENT
Start: 2019-08-27 | End: 2019-10-21 | Stop reason: SDUPTHER

## 2019-08-27 NOTE — CARE COORDINATION
Ray 45 Transitions Initial Follow Up Call    Call within 2 business days of discharge: Yes    Patient: Suhail Agudelo Patient : 1940   MRN: [id number]  Reason for Admission: s/p  Left to right femoral to femoral bypass. Left femoral to above-knee popliteal bypass. Discharge Date: 19 RARS: Readmission Risk Score: 11      Last Discharge [facility]       Complaint Diagnosis Description Type Department Provider    19  Post-op pain Admission (Discharged) Fredrick Clemente MD           Spoke with: Idania Wheeler: [facility]    Non-face-to-face services provided:  Obtained and reviewed discharge summary and/or continuity of care documents    Care Transitions 24 Hour Call    Do you have any ongoing symptoms?:  No  Do you have a copy of your discharge instructions?:  Yes  Do you have all of your prescriptions and are they filled?:  Yes  Have you been contacted by a [address]?:  No  Have you scheduled your follow up appointment?:  Yes  How are you going to get to your appointment?:  Car - family or friend to transport  Were you discharged with any Home Care or Post Acute Services:  Yes  Post Acute Services:  Home Health (Comment: [facility])  Patient DME:  Other  Other Patient DME:  grab bar in shower   Do you have support at home?:  Partner/Spouse/SO  Do you feel like you have everything you need to keep you well at home?:  Yes  Are you an active caregiver in your home?:  No  Care Transitions Interventions  No Identified Needs       Pt states doing well, no issues or concerns. Has incisions on both legs, they are open to air, CDI. She puts an ace wrap on Left leg to keep swelling down during waking hours per MD recommendation. Taking norco as directed. Has  new med, reviewed all others. Pt states she has not heard from home care as yet, this nurse informed pt that a call will be made by this nurse to ensure that they have received a referral. F/U appts listed below.

## 2019-08-27 NOTE — TELEPHONE ENCOUNTER
Pt states that she was in the hospital for a surgery and was discharge yesterday. Med show as print from yesterday but pt states she doesn't have a script with her at home    Medication:   Requested Prescriptions     Pending Prescriptions Disp Refills    HYDROcodone-acetaminophen (NORCO) 5-325 MG per tablet 120 tablet 0     Sig: Take 1 tablet by mouth every 4 hours as needed for Pain for up to 30 days. Last Filled:  06/21/19    Patient Phone Number: [phone] (home)     Last appt: 7/29/2019   Next appt: 9/30/2019    Last OARRS:   RX Monitoring 4/18/2019   Attestation The Prescription Monitoring Report for this patient was reviewed today.        Preferred Pharmacy:   [facility] Staci Catalan, [address] [facility] [phone] - F [phone]  [facility] [address]  Phone: [phone] Fax: [phone] [facility] [address] [facility], [address] [place] [address] [phone] [facility] [phone]  [address] [address]  Phone: [phone] Fax: [phone]

## 2019-09-03 ENCOUNTER — NURSE ONLY (OUTPATIENT)
Dept: CARDIOLOGY CLINIC | Age: 79
End: 2019-09-03
Payer: MEDICARE

## 2019-09-03 DIAGNOSIS — I49.5 SICK SINUS SYNDROME (HCC): ICD-10-CM

## 2019-09-03 DIAGNOSIS — Z95.0 PACEMAKER: ICD-10-CM

## 2019-09-03 PROCEDURE — 93296 REM INTERROG EVL PM/IDS: CPT | Performed by: INTERNAL MEDICINE

## 2019-09-03 PROCEDURE — 93294 REM INTERROG EVL PM/LDLS PM: CPT | Performed by: INTERNAL MEDICINE

## 2019-09-03 NOTE — PROGRESS NOTES
Latitude remote transmission shows normal function. On Xarelto for known AF. See PACEART report under Cardiology tab.

## 2019-09-06 ENCOUNTER — CARE COORDINATION (OUTPATIENT)
Dept: CASE MANAGEMENT | Age: 79
End: 2019-09-06

## 2019-09-06 NOTE — CARE COORDINATION
Ray 45 Transitions Follow Up Call    2019    Patient: Olivia Bolus  Patient : 1940   MRN: [id number]  Reason for Admission:   Discharge Date: 19 RARS: Readmission Risk Score: 11         Spoke with: Laura Cazares pt    Care Transitions Subsequent and Final Call    Subsequent and Final Calls  Do you have any ongoing symptoms?:  No  Have your medications changed?:  No  Do you have any questions related to your medications?:  No  Do you currently have any active services?:  Yes  Are you currently active with any services?:  Home Health  Do you have any needs or concerns that I can assist you with?:  No  Identified Barriers:  None  Care Transitions Interventions  No Identified Needs  Other Interventions:          Pt states doing well, no issues or concerns. Incisions look good. Denies pain, fever.  F/U appts listed below, No need for further f/u CTC calls    Follow Up  Future Appointments   Date Time Provider Triston Ford   9/10/2019  3:45 PM Rio Andres MD FF VASC/ENDO [facility]   2019 11:00 AM Sylvia Cosby MD [facility]   2019 10:40 AM Sylvia Cosby MD [facility]   10/22/2019 10:45 AM TIFFANIE Mejia - CNP  Cardio [facility]   2019  2:30 PM SCHEDULE, [facility] TRANSMISSION  [facility]       Ally Vazquez RN

## 2019-09-10 ENCOUNTER — OFFICE VISIT (OUTPATIENT)
Dept: VASCULAR SURGERY | Age: 79
End: 2019-09-10

## 2019-09-10 VITALS
HEIGHT: 63 IN | BODY MASS INDEX: 25.69 KG/M2 | SYSTOLIC BLOOD PRESSURE: 119 MMHG | DIASTOLIC BLOOD PRESSURE: 78 MMHG | WEIGHT: 145 LBS

## 2019-09-10 DIAGNOSIS — I70.213 ATHEROSCLEROSIS OF NATIVE ARTERIES OF EXTREMITIES WITH INTERMITTENT CLAUDICATION, BILATERAL LEGS (HCC): Primary | ICD-10-CM

## 2019-09-10 PROCEDURE — 99024 POSTOP FOLLOW-UP VISIT: CPT | Performed by: SURGERY

## 2019-09-10 RX ORDER — CEPHALEXIN 500 MG/1
500 CAPSULE ORAL 2 TIMES DAILY
Qty: 14 CAPSULE | Refills: 0 | Status: SHIPPED | OUTPATIENT
Start: 2019-09-10 | End: 2019-09-17 | Stop reason: SDUPTHER

## 2019-09-11 ENCOUNTER — OFFICE VISIT (OUTPATIENT)
Dept: FAMILY MEDICINE CLINIC | Age: 79
End: 2019-09-11
Payer: MEDICARE

## 2019-09-11 VITALS
OXYGEN SATURATION: 97 % | BODY MASS INDEX: 25.95 KG/M2 | RESPIRATION RATE: 12 BRPM | HEART RATE: 72 BPM | DIASTOLIC BLOOD PRESSURE: 60 MMHG | SYSTOLIC BLOOD PRESSURE: 120 MMHG | WEIGHT: 146.5 LBS

## 2019-09-11 DIAGNOSIS — I48.20 CHRONIC ATRIAL FIBRILLATION (HCC): ICD-10-CM

## 2019-09-11 DIAGNOSIS — N18.30 CHRONIC RENAL FAILURE, STAGE 3 (MODERATE) (HCC): ICD-10-CM

## 2019-09-11 DIAGNOSIS — I95.2 HYPOTENSION DUE TO DRUGS: Primary | ICD-10-CM

## 2019-09-11 DIAGNOSIS — I70.213 ATHEROSCLEROSIS OF NATIVE ARTERIES OF EXTREMITIES WITH INTERMITTENT CLAUDICATION, BILATERAL LEGS (HCC): ICD-10-CM

## 2019-09-11 PROCEDURE — 99495 TRANSJ CARE MGMT MOD F2F 14D: CPT | Performed by: FAMILY MEDICINE

## 2019-09-11 RX ORDER — AMLODIPINE BESYLATE 10 MG/1
10 TABLET ORAL DAILY
Qty: 90 TABLET | Refills: 1 | Status: SHIPPED | OUTPATIENT
Start: 2019-09-11 | End: 2019-09-30 | Stop reason: ALTCHOICE

## 2019-09-11 RX ORDER — CLOPIDOGREL BISULFATE 75 MG/1
75 TABLET ORAL DAILY
Qty: 90 TABLET | Refills: 1 | Status: SHIPPED | OUTPATIENT
Start: 2019-09-11 | End: 2020-04-20 | Stop reason: SDUPTHER

## 2019-09-11 RX ORDER — ALLOPURINOL 300 MG/1
300 TABLET ORAL DAILY
Qty: 90 TABLET | Refills: 1 | Status: SHIPPED | OUTPATIENT
Start: 2019-09-11 | End: 2019-12-17

## 2019-09-11 RX ORDER — LEVOTHYROXINE SODIUM 112 UG/1
TABLET ORAL
Qty: 90 TABLET | Refills: 1 | Status: SHIPPED | OUTPATIENT
Start: 2019-09-11 | End: 2020-03-10 | Stop reason: SDUPTHER

## 2019-09-11 RX ORDER — ALLOPURINOL 100 MG/1
100 TABLET ORAL DAILY
Qty: 90 TABLET | Refills: 1 | Status: SHIPPED | OUTPATIENT
Start: 2019-09-11 | End: 2020-02-21

## 2019-09-11 NOTE — PROGRESS NOTES
Subjective:      Patient ID: Fernando Solares is a 66 y.o. female. CC: Patient presents for hospital follow-up. HPI pt is here with her daughter for a hospital follow up. Pt was admitted to [facility] April 22 through April 26. She was hospitalized with a double bypass surgery for leg claudication symptoms. She is already had a follow-up appoint with vascular surgery and has a minor infection and to start on antibiotic therapy. Patient has been having some lightheaded episodes upon standing. She feels her appetite is good. She obviously denies any shortness of breath or chest pain. .  Pain is tolerable in her legs. Care Coordinator phone contact documented in Epic note for 8/27/2019.      Patient Active Problem List   Diagnosis    Chronic atrial fibrillation (HCC)    Essential hypertension, benign    Mixed hyperlipidemia    Chronic gouty arthropathy    Acquired hypothyroidism    Arthritis    Non-rheumatic mitral regurgitation    Restrictive lung disease    Sick sinus syndrome (HCC)    Allergic rhinitis    Fibrocystic breast    Dizziness    Cerebrovascular accident (CVA) ([address]    Pacemaker    Typical atrial flutter ([address]    Primary osteoarthritis involving multiple joints    Vitamin D deficiency    Tremor    Chronic total occlusion of native coronary artery    Age related osteoporosis    Chronic systolic congestive heart failure (HCC)    SOB (shortness of breath)    Congestive heart failure with left ventricular dysfunction (HCC)    Chronic renal failure, stage 3 (moderate) (HCC)    PAD (peripheral artery disease) (HCC)    Atherosclerosis of native arteries of extremities with intermittent claudication, bilateral legs (HCC)    PVD (peripheral vascular disease) ([address]       Outpatient Medications Marked as Taking for the 9/11/19 encounter (Office Visit) with Peter Valentin MD   Medication Sig Dispense Refill    cephALEXin (KEFLEX) 500 MG capsule Take 1 capsule by fibrillation ([facility]    Chronic renal failure, stage 3 (moderate) (HCC)    Other orders  -     allopurinol (ZYLOPRIM) 300 MG tablet; Take 1 tablet by mouth daily  -     levothyroxine (SYNTHROID) 112 MCG tablet; TAKE 1 TABLET DAILY  -     allopurinol (ZYLOPRIM) 100 MG tablet; Take 1 tablet by mouth daily Take along with 300mg tablet for total of 400mg.  -     clopidogrel (PLAVIX) 75 MG tablet; Take 1 tablet by mouth daily  -     amLODIPine (NORVASC) 10 MG tablet; Take 1 tablet by mouth daily            Plan:      Hospital information reviewed with patient and daughter  Kidney function is significantly improved but her symptoms mostly consistent with hypotension. Advised patient to discontinue taking amlodipine and stop taking the Demadex until she notices her weight is starting to climb again. At that point time she can restart the Demadex. No other change her current medications  Keep appointment for the vascular surgery next week and with myself in 2 weeks  Medical decision making of moderate complexity. Please note that this chart was generated using Dragon dictation software. Although every effort was made to ensure the accuracy of this automated transcription, some errors in transcription may have occurred.

## 2019-09-17 ENCOUNTER — OFFICE VISIT (OUTPATIENT)
Dept: VASCULAR SURGERY | Age: 79
End: 2019-09-17

## 2019-09-17 VITALS
HEIGHT: 63 IN | DIASTOLIC BLOOD PRESSURE: 70 MMHG | BODY MASS INDEX: 25.87 KG/M2 | WEIGHT: 146 LBS | SYSTOLIC BLOOD PRESSURE: 122 MMHG

## 2019-09-17 DIAGNOSIS — I70.213 ATHEROSCLEROSIS OF NATIVE ARTERIES OF EXTREMITIES WITH INTERMITTENT CLAUDICATION, BILATERAL LEGS (HCC): Primary | ICD-10-CM

## 2019-09-17 PROCEDURE — 99024 POSTOP FOLLOW-UP VISIT: CPT | Performed by: SURGERY

## 2019-09-17 RX ORDER — CEPHALEXIN 500 MG/1
500 CAPSULE ORAL 2 TIMES DAILY
Qty: 14 CAPSULE | Refills: 0 | Status: SHIPPED | OUTPATIENT
Start: 2019-09-17 | End: 2019-09-27

## 2019-09-27 RX ORDER — NITROGLYCERIN 400 UG/1
1 SPRAY ORAL EVERY 5 MIN PRN
Qty: 1 BOTTLE | Refills: 3 | Status: SHIPPED | OUTPATIENT
Start: 2019-09-27 | End: 2019-12-17

## 2019-09-30 ENCOUNTER — HOSPITAL ENCOUNTER (OUTPATIENT)
Age: 79
Discharge: HOME | End: 2019-09-30
Payer: MEDICARE

## 2019-09-30 ENCOUNTER — OFFICE VISIT (OUTPATIENT)
Dept: FAMILY MEDICINE CLINIC | Age: 79
End: 2019-09-30
Payer: MEDICARE

## 2019-09-30 VITALS
WEIGHT: 145.2 LBS | SYSTOLIC BLOOD PRESSURE: 110 MMHG | OXYGEN SATURATION: 97 % | BODY MASS INDEX: 25.72 KG/M2 | DIASTOLIC BLOOD PRESSURE: 68 MMHG | HEART RATE: 74 BPM

## 2019-09-30 DIAGNOSIS — M19.90 ARTHRITIS: ICD-10-CM

## 2019-09-30 DIAGNOSIS — Z23 NEED FOR VACCINATION: ICD-10-CM

## 2019-09-30 DIAGNOSIS — I70.213 ATHEROSCLEROSIS OF NATIVE ARTERIES OF EXTREMITIES WITH INTERMITTENT CLAUDICATION, BILATERAL LEGS (HCC): ICD-10-CM

## 2019-09-30 DIAGNOSIS — N18.30 CHRONIC RENAL FAILURE, STAGE 3 (MODERATE) (HCC): Primary | ICD-10-CM

## 2019-09-30 DIAGNOSIS — I10 ESSENTIAL HYPERTENSION, BENIGN: Chronic | ICD-10-CM

## 2019-09-30 DIAGNOSIS — N18.30 CHRONIC RENAL FAILURE, STAGE 3 (MODERATE) (HCC): ICD-10-CM

## 2019-09-30 DIAGNOSIS — I50.9 CONGESTIVE HEART FAILURE WITH LEFT VENTRICULAR DYSFUNCTION (HCC): ICD-10-CM

## 2019-09-30 LAB
ANION GAP SERPL CALCULATED.3IONS-SCNC: 13 MMOL/L (ref 3–16)
BUN BLDV-MCNC: 26 MG/DL (ref 7–20)
CALCIUM SERPL-MCNC: 9.9 MG/DL (ref 8.3–10.6)
CHLORIDE BLD-SCNC: 108 MMOL/L (ref 99–110)
CO2: 21 MMOL/L (ref 21–32)
CREAT SERPL-MCNC: 1.3 MG/DL (ref 0.6–1.2)
GFR AFRICAN AMERICAN: 48
GFR NON-AFRICAN AMERICAN: 40
GLUCOSE BLD-MCNC: 94 MG/DL (ref 70–99)
POTASSIUM SERPL-SCNC: 5.2 MMOL/L (ref 3.5–5.1)
PRO-BNP: 3613 PG/ML (ref 0–449)
SODIUM BLD-SCNC: 142 MMOL/L (ref 136–145)

## 2019-09-30 PROCEDURE — 99214 OFFICE O/P EST MOD 30 MIN: CPT | Performed by: FAMILY MEDICINE

## 2019-09-30 PROCEDURE — 80048 BASIC METABOLIC PNL TOTAL CA: CPT

## 2019-09-30 PROCEDURE — G0008 ADMIN INFLUENZA VIRUS VAC: HCPCS | Performed by: FAMILY MEDICINE

## 2019-09-30 PROCEDURE — G8399 PT W/DXA RESULTS DOCUMENT: HCPCS | Performed by: FAMILY MEDICINE

## 2019-09-30 PROCEDURE — 83880 ASSAY OF NATRIURETIC PEPTIDE: CPT

## 2019-09-30 PROCEDURE — 90653 IIV ADJUVANT VACCINE IM: CPT | Performed by: FAMILY MEDICINE

## 2019-09-30 PROCEDURE — G8427 DOCREV CUR MEDS BY ELIG CLIN: HCPCS | Performed by: FAMILY MEDICINE

## 2019-09-30 PROCEDURE — G8417 CALC BMI ABV UP PARAM F/U: HCPCS | Performed by: FAMILY MEDICINE

## 2019-09-30 PROCEDURE — 4040F PNEUMOC VAC/ADMIN/RCVD: CPT | Performed by: FAMILY MEDICINE

## 2019-09-30 PROCEDURE — 1036F TOBACCO NON-USER: CPT | Performed by: FAMILY MEDICINE

## 2019-09-30 PROCEDURE — 36415 COLL VENOUS BLD VENIPUNCTURE: CPT

## 2019-09-30 PROCEDURE — 1123F ACP DISCUSS/DSCN MKR DOCD: CPT | Performed by: FAMILY MEDICINE

## 2019-09-30 PROCEDURE — G8598 ASA/ANTIPLAT THER USED: HCPCS | Performed by: FAMILY MEDICINE

## 2019-09-30 PROCEDURE — 1090F PRES/ABSN URINE INCON ASSESS: CPT | Performed by: FAMILY MEDICINE

## 2019-09-30 RX ORDER — GABAPENTIN 100 MG/1
200 CAPSULE ORAL NIGHTLY
COMMUNITY
End: 2019-10-08 | Stop reason: SDUPTHER

## 2019-10-01 ENCOUNTER — TELEPHONE (OUTPATIENT)
Dept: FAMILY MEDICINE CLINIC | Age: 79
End: 2019-10-01

## 2019-10-01 ENCOUNTER — OFFICE VISIT (OUTPATIENT)
Dept: VASCULAR SURGERY | Age: 79
End: 2019-10-01

## 2019-10-01 VITALS
DIASTOLIC BLOOD PRESSURE: 64 MMHG | SYSTOLIC BLOOD PRESSURE: 118 MMHG | HEIGHT: 63 IN | WEIGHT: 146 LBS | BODY MASS INDEX: 25.87 KG/M2

## 2019-10-01 DIAGNOSIS — I70.213 ATHEROSCLEROSIS OF NATIVE ARTERIES OF EXTREMITIES WITH INTERMITTENT CLAUDICATION, BILATERAL LEGS (HCC): Primary | ICD-10-CM

## 2019-10-01 PROCEDURE — 99024 POSTOP FOLLOW-UP VISIT: CPT | Performed by: SURGERY

## 2019-10-05 PROBLEM — R06.02 SOB (SHORTNESS OF BREATH): Status: RESOLVED | Noted: 2019-07-17 | Resolved: 2019-10-05

## 2019-10-08 ENCOUNTER — OFFICE VISIT (OUTPATIENT)
Dept: NEUROLOGY | Age: 79
End: 2019-10-08
Payer: MEDICARE

## 2019-10-08 VITALS
HEART RATE: 69 BPM | SYSTOLIC BLOOD PRESSURE: 133 MMHG | BODY MASS INDEX: 25.86 KG/M2 | DIASTOLIC BLOOD PRESSURE: 53 MMHG | WEIGHT: 146 LBS

## 2019-10-08 DIAGNOSIS — I73.9 CLAUDICATION OF BOTH LOWER EXTREMITIES (HCC): ICD-10-CM

## 2019-10-08 DIAGNOSIS — G60.9 IDIOPATHIC PERIPHERAL NEUROPATHY: Primary | ICD-10-CM

## 2019-10-08 DIAGNOSIS — M79.2 NEUROPATHIC PAIN: ICD-10-CM

## 2019-10-08 PROCEDURE — G8482 FLU IMMUNIZE ORDER/ADMIN: HCPCS | Performed by: PSYCHIATRY & NEUROLOGY

## 2019-10-08 PROCEDURE — 4040F PNEUMOC VAC/ADMIN/RCVD: CPT | Performed by: PSYCHIATRY & NEUROLOGY

## 2019-10-08 PROCEDURE — G8399 PT W/DXA RESULTS DOCUMENT: HCPCS | Performed by: PSYCHIATRY & NEUROLOGY

## 2019-10-08 PROCEDURE — 99213 OFFICE O/P EST LOW 20 MIN: CPT | Performed by: PSYCHIATRY & NEUROLOGY

## 2019-10-08 PROCEDURE — G8417 CALC BMI ABV UP PARAM F/U: HCPCS | Performed by: PSYCHIATRY & NEUROLOGY

## 2019-10-08 PROCEDURE — 1090F PRES/ABSN URINE INCON ASSESS: CPT | Performed by: PSYCHIATRY & NEUROLOGY

## 2019-10-08 PROCEDURE — G8427 DOCREV CUR MEDS BY ELIG CLIN: HCPCS | Performed by: PSYCHIATRY & NEUROLOGY

## 2019-10-08 PROCEDURE — G8598 ASA/ANTIPLAT THER USED: HCPCS | Performed by: PSYCHIATRY & NEUROLOGY

## 2019-10-08 PROCEDURE — 1036F TOBACCO NON-USER: CPT | Performed by: PSYCHIATRY & NEUROLOGY

## 2019-10-08 PROCEDURE — 1123F ACP DISCUSS/DSCN MKR DOCD: CPT | Performed by: PSYCHIATRY & NEUROLOGY

## 2019-10-08 RX ORDER — GABAPENTIN 300 MG/1
CAPSULE ORAL
Qty: 90 CAPSULE | Refills: 1 | Status: SHIPPED | OUTPATIENT
Start: 2019-10-08 | End: 2019-10-18 | Stop reason: SINTOL

## 2019-10-17 ENCOUNTER — TELEPHONE (OUTPATIENT)
Dept: CARDIOLOGY CLINIC | Age: 79
End: 2019-10-17

## 2019-10-17 ENCOUNTER — TELEPHONE (OUTPATIENT)
Dept: NEUROLOGY | Age: 79
End: 2019-10-17

## 2019-10-17 RX ORDER — GABAPENTIN 100 MG/1
100 CAPSULE ORAL 2 TIMES DAILY
Qty: 180 CAPSULE | Refills: 1 | Status: SHIPPED | OUTPATIENT
Start: 2019-10-17 | End: 2019-10-18 | Stop reason: SINTOL

## 2019-10-18 ENCOUNTER — TELEPHONE (OUTPATIENT)
Dept: FAMILY MEDICINE CLINIC | Age: 79
End: 2019-10-18

## 2019-10-18 DIAGNOSIS — R06.02 SOB (SHORTNESS OF BREATH): Primary | ICD-10-CM

## 2019-10-21 DIAGNOSIS — M15.9 PRIMARY OSTEOARTHRITIS INVOLVING MULTIPLE JOINTS: ICD-10-CM

## 2019-10-21 RX ORDER — HYDROCODONE BITARTRATE AND ACETAMINOPHEN 5; 325 MG/1; MG/1
1 TABLET ORAL 4 TIMES DAILY PRN
Qty: 120 TABLET | Refills: 0 | Status: SHIPPED | OUTPATIENT
Start: 2019-10-21 | End: 2019-11-22 | Stop reason: SDUPTHER

## 2019-10-23 ENCOUNTER — OFFICE VISIT (OUTPATIENT)
Dept: CARDIOLOGY CLINIC | Age: 79
End: 2019-10-23
Payer: MEDICARE

## 2019-10-23 VITALS
DIASTOLIC BLOOD PRESSURE: 70 MMHG | OXYGEN SATURATION: 96 % | WEIGHT: 147 LBS | HEART RATE: 56 BPM | SYSTOLIC BLOOD PRESSURE: 120 MMHG | HEIGHT: 63 IN | BODY MASS INDEX: 26.05 KG/M2

## 2019-10-23 DIAGNOSIS — I70.213 ATHEROSCLEROSIS OF NATIVE ARTERIES OF EXTREMITIES WITH INTERMITTENT CLAUDICATION, BILATERAL LEGS (HCC): ICD-10-CM

## 2019-10-23 DIAGNOSIS — I48.20 CHRONIC ATRIAL FIBRILLATION (HCC): ICD-10-CM

## 2019-10-23 DIAGNOSIS — I10 ESSENTIAL HYPERTENSION, BENIGN: Primary | Chronic | ICD-10-CM

## 2019-10-23 DIAGNOSIS — R06.02 SOB (SHORTNESS OF BREATH): ICD-10-CM

## 2019-10-23 PROCEDURE — G8399 PT W/DXA RESULTS DOCUMENT: HCPCS | Performed by: NURSE PRACTITIONER

## 2019-10-23 PROCEDURE — 99214 OFFICE O/P EST MOD 30 MIN: CPT | Performed by: NURSE PRACTITIONER

## 2019-10-23 PROCEDURE — G8427 DOCREV CUR MEDS BY ELIG CLIN: HCPCS | Performed by: NURSE PRACTITIONER

## 2019-10-23 PROCEDURE — G8482 FLU IMMUNIZE ORDER/ADMIN: HCPCS | Performed by: NURSE PRACTITIONER

## 2019-10-23 PROCEDURE — G8417 CALC BMI ABV UP PARAM F/U: HCPCS | Performed by: NURSE PRACTITIONER

## 2019-10-23 PROCEDURE — 1123F ACP DISCUSS/DSCN MKR DOCD: CPT | Performed by: NURSE PRACTITIONER

## 2019-10-23 PROCEDURE — 1036F TOBACCO NON-USER: CPT | Performed by: NURSE PRACTITIONER

## 2019-10-23 PROCEDURE — G8598 ASA/ANTIPLAT THER USED: HCPCS | Performed by: NURSE PRACTITIONER

## 2019-10-23 PROCEDURE — 1090F PRES/ABSN URINE INCON ASSESS: CPT | Performed by: NURSE PRACTITIONER

## 2019-10-23 PROCEDURE — 4040F PNEUMOC VAC/ADMIN/RCVD: CPT | Performed by: NURSE PRACTITIONER

## 2019-10-24 ENCOUNTER — TELEPHONE (OUTPATIENT)
Dept: NEUROLOGY | Age: 79
End: 2019-10-24

## 2019-10-31 ENCOUNTER — TELEPHONE (OUTPATIENT)
Dept: VASCULAR SURGERY | Age: 79
End: 2019-10-31

## 2019-10-31 DIAGNOSIS — I70.219 ATHEROSCLEROSIS OF ARTERY OF EXTREMITY WITH INTERMITTENT CLAUDICATION (HCC): Primary | ICD-10-CM

## 2019-10-31 DIAGNOSIS — I70.213 ATHEROSCLEROSIS OF NATIVE ARTERIES OF EXTREMITIES WITH INTERMITTENT CLAUDICATION, BILATERAL LEGS (HCC): ICD-10-CM

## 2019-11-04 ENCOUNTER — HOSPITAL ENCOUNTER (OUTPATIENT)
Dept: CT IMAGING | Age: 79
Discharge: HOME | End: 2019-11-04
Payer: MEDICARE

## 2019-11-04 DIAGNOSIS — I70.213 ATHEROSCLEROSIS OF NATIVE ARTERIES OF EXTREMITIES WITH INTERMITTENT CLAUDICATION, BILATERAL LEGS (HCC): ICD-10-CM

## 2019-11-04 LAB
ANION GAP SERPL CALCULATED.3IONS-SCNC: 11 MMOL/L (ref 3–16)
BUN BLDV-MCNC: 45 MG/DL (ref 7–20)
CALCIUM SERPL-MCNC: 9.8 MG/DL (ref 8.3–10.6)
CHLORIDE BLD-SCNC: 107 MMOL/L (ref 99–110)
CO2: 25 MMOL/L (ref 21–32)
CREAT SERPL-MCNC: 1.6 MG/DL (ref 0.6–1.2)
GFR AFRICAN AMERICAN: 38
GFR NON-AFRICAN AMERICAN: 31
GLUCOSE BLD-MCNC: 136 MG/DL (ref 70–99)
POTASSIUM SERPL-SCNC: 5.3 MMOL/L (ref 3.5–5.1)
PRO-BNP: 2628 PG/ML (ref 0–449)
SODIUM BLD-SCNC: 143 MMOL/L (ref 136–145)

## 2019-11-04 PROCEDURE — 80048 BASIC METABOLIC PNL TOTAL CA: CPT

## 2019-11-04 PROCEDURE — 83880 ASSAY OF NATRIURETIC PEPTIDE: CPT

## 2019-11-04 PROCEDURE — 36415 COLL VENOUS BLD VENIPUNCTURE: CPT

## 2019-11-15 ENCOUNTER — HOSPITAL ENCOUNTER (OUTPATIENT)
Dept: ONCOLOGY | Age: 79
Setting detail: INFUSION SERIES
End: 2019-11-15
Payer: MEDICARE

## 2019-11-15 ENCOUNTER — HOSPITAL ENCOUNTER (INPATIENT)
Age: 79
LOS: 4 days | Discharge: HOME HEALTH | DRG: 291 | End: 2019-11-19
Attending: EMERGENCY MEDICINE | Admitting: HOSPITALIST
Payer: MEDICARE

## 2019-11-15 ENCOUNTER — APPOINTMENT (OUTPATIENT)
Dept: GENERAL RADIOLOGY | Age: 79
DRG: 291 | End: 2019-11-15
Payer: MEDICARE

## 2019-11-15 DIAGNOSIS — R06.01 ORTHOPNEA: ICD-10-CM

## 2019-11-15 DIAGNOSIS — I50.9 ACUTE ON CHRONIC CONGESTIVE HEART FAILURE, UNSPECIFIED HEART FAILURE TYPE (HCC): Primary | ICD-10-CM

## 2019-11-15 DIAGNOSIS — J81.0 ACUTE PULMONARY EDEMA (HCC): ICD-10-CM

## 2019-11-15 DIAGNOSIS — R06.02 SHORTNESS OF BREATH: ICD-10-CM

## 2019-11-15 DIAGNOSIS — I25.82 CHRONIC TOTAL OCCLUSION OF NATIVE CORONARY ARTERY: ICD-10-CM

## 2019-11-15 DIAGNOSIS — I25.10 CHRONIC TOTAL OCCLUSION OF NATIVE CORONARY ARTERY: ICD-10-CM

## 2019-11-15 PROBLEM — I50.23 ACUTE ON CHRONIC SYSTOLIC (CONGESTIVE) HEART FAILURE (HCC): Status: ACTIVE | Noted: 2019-11-15

## 2019-11-15 LAB
A/G RATIO: 1.3 (ref 1.1–2.2)
ALBUMIN SERPL-MCNC: 4 G/DL (ref 3.4–5)
ALP BLD-CCNC: 81 U/L (ref 40–129)
ALT SERPL-CCNC: 13 U/L (ref 10–40)
ANION GAP SERPL CALCULATED.3IONS-SCNC: 12 MMOL/L (ref 3–16)
ANION GAP SERPL CALCULATED.3IONS-SCNC: 15 MMOL/L (ref 3–16)
AST SERPL-CCNC: 17 U/L (ref 15–37)
BASOPHILS ABSOLUTE: 0.1 K/UL (ref 0–0.2)
BASOPHILS RELATIVE PERCENT: 1.3 %
BILIRUB SERPL-MCNC: 0.3 MG/DL (ref 0–1)
BUN BLDV-MCNC: 40 MG/DL (ref 7–20)
BUN BLDV-MCNC: 45 MG/DL (ref 7–20)
CALCIUM SERPL-MCNC: 9.9 MG/DL (ref 8.3–10.6)
CALCIUM SERPL-MCNC: 9.9 MG/DL (ref 8.3–10.6)
CHLORIDE BLD-SCNC: 100 MMOL/L (ref 99–110)
CHLORIDE BLD-SCNC: 106 MMOL/L (ref 99–110)
CO2: 22 MMOL/L (ref 21–32)
CO2: 22 MMOL/L (ref 21–32)
CREAT SERPL-MCNC: 1.4 MG/DL (ref 0.6–1.2)
CREAT SERPL-MCNC: 1.6 MG/DL (ref 0.6–1.2)
EKG ATRIAL RATE: 79 BPM
EKG DIAGNOSIS: NORMAL
EKG P AXIS: 51 DEGREES
EKG P-R INTERVAL: 256 MS
EKG Q-T INTERVAL: 392 MS
EKG QRS DURATION: 106 MS
EKG QTC CALCULATION (BAZETT): 449 MS
EKG R AXIS: 21 DEGREES
EKG T AXIS: 66 DEGREES
EKG VENTRICULAR RATE: 79 BPM
EOSINOPHILS ABSOLUTE: 0.4 K/UL (ref 0–0.6)
EOSINOPHILS RELATIVE PERCENT: 4.1 %
GFR AFRICAN AMERICAN: 38
GFR AFRICAN AMERICAN: 44
GFR NON-AFRICAN AMERICAN: 31
GFR NON-AFRICAN AMERICAN: 36
GLOBULIN: 3.2 G/DL
GLUCOSE BLD-MCNC: 109 MG/DL (ref 70–99)
GLUCOSE BLD-MCNC: 146 MG/DL (ref 70–99)
HCT VFR BLD CALC: 46.7 % (ref 36–48)
HEMOGLOBIN: 14.5 G/DL (ref 12–16)
INR BLD: 1.52 (ref 0.86–1.14)
LYMPHOCYTES ABSOLUTE: 1.4 K/UL (ref 1–5.1)
LYMPHOCYTES RELATIVE PERCENT: 12.7 %
MAGNESIUM: 2 MG/DL (ref 1.8–2.4)
MCH RBC QN AUTO: 24.9 PG (ref 26–34)
MCHC RBC AUTO-ENTMCNC: 31 G/DL (ref 31–36)
MCV RBC AUTO: 80.4 FL (ref 80–100)
MONOCYTES ABSOLUTE: 0.7 K/UL (ref 0–1.3)
MONOCYTES RELATIVE PERCENT: 6.3 %
NEUTROPHILS ABSOLUTE: 8.4 K/UL (ref 1.7–7.7)
NEUTROPHILS RELATIVE PERCENT: 75.6 %
PDW BLD-RTO: 18.8 % (ref 12.4–15.4)
PLATELET # BLD: 299 K/UL (ref 135–450)
PMV BLD AUTO: 9.1 FL (ref 5–10.5)
POTASSIUM REFLEX MAGNESIUM: 4.4 MMOL/L (ref 3.5–5.1)
POTASSIUM SERPL-SCNC: 3.8 MMOL/L (ref 3.5–5.1)
PRO-BNP: 5225 PG/ML (ref 0–449)
PROTHROMBIN TIME: 17.3 SEC (ref 9.8–13)
RBC # BLD: 5.81 M/UL (ref 4–5.2)
SODIUM BLD-SCNC: 137 MMOL/L (ref 136–145)
SODIUM BLD-SCNC: 140 MMOL/L (ref 136–145)
TOTAL PROTEIN: 7.2 G/DL (ref 6.4–8.2)
TROPONIN: 0.01 NG/ML
WBC # BLD: 11.1 K/UL (ref 4–11)

## 2019-11-15 PROCEDURE — 84484 ASSAY OF TROPONIN QUANT: CPT

## 2019-11-15 PROCEDURE — 83880 ASSAY OF NATRIURETIC PEPTIDE: CPT

## 2019-11-15 PROCEDURE — 94760 N-INVAS EAR/PLS OXIMETRY 1: CPT

## 2019-11-15 PROCEDURE — 83735 ASSAY OF MAGNESIUM: CPT

## 2019-11-15 PROCEDURE — 96374 THER/PROPH/DIAG INJ IV PUSH: CPT

## 2019-11-15 PROCEDURE — 2580000003 HC RX 258: Performed by: INTERNAL MEDICINE

## 2019-11-15 PROCEDURE — 93005 ELECTROCARDIOGRAM TRACING: CPT | Performed by: EMERGENCY MEDICINE

## 2019-11-15 PROCEDURE — 6360000002 HC RX W HCPCS: Performed by: EMERGENCY MEDICINE

## 2019-11-15 PROCEDURE — 85025 COMPLETE CBC W/AUTO DIFF WBC: CPT

## 2019-11-15 PROCEDURE — 99223 1ST HOSP IP/OBS HIGH 75: CPT | Performed by: INTERNAL MEDICINE

## 2019-11-15 PROCEDURE — 2580000003 HC RX 258: Performed by: HOSPITALIST

## 2019-11-15 PROCEDURE — 85610 PROTHROMBIN TIME: CPT

## 2019-11-15 PROCEDURE — 36415 COLL VENOUS BLD VENIPUNCTURE: CPT

## 2019-11-15 PROCEDURE — 80053 COMPREHEN METABOLIC PANEL: CPT

## 2019-11-15 PROCEDURE — 6370000000 HC RX 637 (ALT 250 FOR IP): Performed by: HOSPITALIST

## 2019-11-15 PROCEDURE — 93010 ELECTROCARDIOGRAM REPORT: CPT | Performed by: INTERNAL MEDICINE

## 2019-11-15 PROCEDURE — 71046 X-RAY EXAM CHEST 2 VIEWS: CPT

## 2019-11-15 PROCEDURE — 6360000002 HC RX W HCPCS: Performed by: HOSPITALIST

## 2019-11-15 PROCEDURE — 99285 EMERGENCY DEPT VISIT HI MDM: CPT

## 2019-11-15 PROCEDURE — 1200000000 HC SEMI PRIVATE

## 2019-11-15 RX ORDER — SODIUM CHLORIDE 0.9 % (FLUSH) 0.9 %
10 SYRINGE (ML) INJECTION PRN
Status: DISCONTINUED | OUTPATIENT
Start: 2019-11-15 | End: 2019-11-19 | Stop reason: HOSPADM

## 2019-11-15 RX ORDER — SODIUM CHLORIDE 0.9 % (FLUSH) 0.9 %
10 SYRINGE (ML) INJECTION EVERY 12 HOURS SCHEDULED
Status: DISCONTINUED | OUTPATIENT
Start: 2019-11-15 | End: 2019-11-19 | Stop reason: HOSPADM

## 2019-11-15 RX ORDER — MORPHINE SULFATE 2 MG/ML
2 INJECTION, SOLUTION INTRAMUSCULAR; INTRAVENOUS EVERY 4 HOURS PRN
Status: DISCONTINUED | OUTPATIENT
Start: 2019-11-15 | End: 2019-11-19 | Stop reason: HOSPADM

## 2019-11-15 RX ORDER — ACETAMINOPHEN 325 MG/1
650 TABLET ORAL EVERY 4 HOURS PRN
Status: DISCONTINUED | OUTPATIENT
Start: 2019-11-15 | End: 2019-11-19 | Stop reason: HOSPADM

## 2019-11-15 RX ORDER — ONDANSETRON 2 MG/ML
4 INJECTION INTRAMUSCULAR; INTRAVENOUS EVERY 6 HOURS PRN
Status: DISCONTINUED | OUTPATIENT
Start: 2019-11-15 | End: 2019-11-15 | Stop reason: SDUPTHER

## 2019-11-15 RX ORDER — CLOPIDOGREL BISULFATE 75 MG/1
75 TABLET ORAL DAILY
Status: DISCONTINUED | OUTPATIENT
Start: 2019-11-15 | End: 2019-11-19 | Stop reason: HOSPADM

## 2019-11-15 RX ORDER — FUROSEMIDE 10 MG/ML
40 INJECTION INTRAMUSCULAR; INTRAVENOUS ONCE
Status: COMPLETED | OUTPATIENT
Start: 2019-11-15 | End: 2019-11-15

## 2019-11-15 RX ORDER — HYDROCODONE BITARTRATE AND ACETAMINOPHEN 5; 325 MG/1; MG/1
1 TABLET ORAL 4 TIMES DAILY PRN
Status: DISCONTINUED | OUTPATIENT
Start: 2019-11-15 | End: 2019-11-19 | Stop reason: HOSPADM

## 2019-11-15 RX ORDER — GABAPENTIN 100 MG/1
100 CAPSULE ORAL 2 TIMES DAILY
Status: DISCONTINUED | OUTPATIENT
Start: 2019-11-15 | End: 2019-11-16

## 2019-11-15 RX ORDER — FUROSEMIDE 10 MG/ML
40 INJECTION INTRAMUSCULAR; INTRAVENOUS 2 TIMES DAILY
Status: DISCONTINUED | OUTPATIENT
Start: 2019-11-15 | End: 2019-11-16

## 2019-11-15 RX ORDER — TOPIRAMATE 100 MG/1
100 TABLET, FILM COATED ORAL 2 TIMES DAILY
Status: DISCONTINUED | OUTPATIENT
Start: 2019-11-15 | End: 2019-11-19 | Stop reason: HOSPADM

## 2019-11-15 RX ORDER — ONDANSETRON 2 MG/ML
4 INJECTION INTRAMUSCULAR; INTRAVENOUS EVERY 6 HOURS PRN
Status: DISCONTINUED | OUTPATIENT
Start: 2019-11-15 | End: 2019-11-19 | Stop reason: HOSPADM

## 2019-11-15 RX ORDER — METOPROLOL TARTRATE 50 MG/1
100 TABLET, FILM COATED ORAL 2 TIMES DAILY
Status: DISCONTINUED | OUTPATIENT
Start: 2019-11-15 | End: 2019-11-19 | Stop reason: HOSPADM

## 2019-11-15 RX ORDER — TIZANIDINE 4 MG/1
4 TABLET ORAL EVERY 6 HOURS PRN
Status: DISCONTINUED | OUTPATIENT
Start: 2019-11-15 | End: 2019-11-19 | Stop reason: HOSPADM

## 2019-11-15 RX ADMIN — CLOPIDOGREL 75 MG: 75 TABLET, FILM COATED ORAL at 09:13

## 2019-11-15 RX ADMIN — Medication 10 ML: at 17:35

## 2019-11-15 RX ADMIN — TOPIRAMATE 100 MG: 100 TABLET, FILM COATED ORAL at 19:50

## 2019-11-15 RX ADMIN — FUROSEMIDE 40 MG: 10 INJECTION, SOLUTION INTRAMUSCULAR; INTRAVENOUS at 03:30

## 2019-11-15 RX ADMIN — TOPIRAMATE 100 MG: 100 TABLET, FILM COATED ORAL at 09:13

## 2019-11-15 RX ADMIN — Medication 10 ML: at 19:51

## 2019-11-15 RX ADMIN — FUROSEMIDE 40 MG: 10 INJECTION, SOLUTION INTRAMUSCULAR; INTRAVENOUS at 09:13

## 2019-11-15 RX ADMIN — HYDROCODONE BITARTRATE AND ACETAMINOPHEN 1 TABLET: 5; 325 TABLET ORAL at 21:16

## 2019-11-15 RX ADMIN — MORPHINE SULFATE 2 MG: 2 INJECTION, SOLUTION INTRAMUSCULAR; INTRAVENOUS at 14:31

## 2019-11-15 RX ADMIN — Medication 10 ML: at 22:56

## 2019-11-15 RX ADMIN — GABAPENTIN 100 MG: 100 CAPSULE ORAL at 19:50

## 2019-11-15 RX ADMIN — TIZANIDINE 4 MG: 4 TABLET ORAL at 11:40

## 2019-11-15 RX ADMIN — MORPHINE SULFATE 2 MG: 2 INJECTION, SOLUTION INTRAMUSCULAR; INTRAVENOUS at 22:57

## 2019-11-15 RX ADMIN — FUROSEMIDE 40 MG: 10 INJECTION, SOLUTION INTRAMUSCULAR; INTRAVENOUS at 17:34

## 2019-11-15 RX ADMIN — Medication 10 ML: at 09:13

## 2019-11-15 RX ADMIN — RIVAROXABAN 15 MG: 15 TABLET, FILM COATED ORAL at 17:34

## 2019-11-15 RX ADMIN — TIZANIDINE 4 MG: 4 TABLET ORAL at 19:50

## 2019-11-15 RX ADMIN — METOPROLOL TARTRATE 100 MG: 50 TABLET, FILM COATED ORAL at 09:13

## 2019-11-15 RX ADMIN — Medication 10 ML: at 18:30

## 2019-11-15 RX ADMIN — Medication 10 ML: at 14:31

## 2019-11-15 RX ADMIN — HYDROCODONE BITARTRATE AND ACETAMINOPHEN 1 TABLET: 5; 325 TABLET ORAL at 12:24

## 2019-11-15 RX ADMIN — MORPHINE SULFATE 2 MG: 2 INJECTION, SOLUTION INTRAMUSCULAR; INTRAVENOUS at 18:30

## 2019-11-15 RX ADMIN — METOPROLOL TARTRATE 100 MG: 50 TABLET, FILM COATED ORAL at 19:50

## 2019-11-15 ASSESSMENT — PAIN DESCRIPTION - DESCRIPTORS
DESCRIPTORS: OTHER (COMMENT)
DESCRIPTORS: OTHER (COMMENT)
DESCRIPTORS: SQUEEZING
DESCRIPTORS: SQUEEZING

## 2019-11-15 ASSESSMENT — PAIN SCALES - GENERAL
PAINLEVEL_OUTOF10: 0
PAINLEVEL_OUTOF10: 3
PAINLEVEL_OUTOF10: 5
PAINLEVEL_OUTOF10: 7
PAINLEVEL_OUTOF10: 0
PAINLEVEL_OUTOF10: 10
PAINLEVEL_OUTOF10: 7
PAINLEVEL_OUTOF10: 0
PAINLEVEL_OUTOF10: 0
PAINLEVEL_OUTOF10: 6
PAINLEVEL_OUTOF10: 7
PAINLEVEL_OUTOF10: 6
PAINLEVEL_OUTOF10: 0
PAINLEVEL_OUTOF10: 7

## 2019-11-15 ASSESSMENT — PAIN DESCRIPTION - LOCATION
LOCATION: LEG

## 2019-11-15 ASSESSMENT — PAIN DESCRIPTION - ORIENTATION
ORIENTATION: RIGHT;LEFT
ORIENTATION: LEFT;RIGHT
ORIENTATION: RIGHT;LEFT

## 2019-11-15 ASSESSMENT — PAIN DESCRIPTION - PAIN TYPE
TYPE: ACUTE PAIN

## 2019-11-16 ENCOUNTER — APPOINTMENT (OUTPATIENT)
Dept: GENERAL RADIOLOGY | Age: 79
DRG: 291 | End: 2019-11-16
Payer: MEDICARE

## 2019-11-16 LAB
ANION GAP SERPL CALCULATED.3IONS-SCNC: 14 MMOL/L (ref 3–16)
BILIRUBIN URINE: NEGATIVE
BLOOD, URINE: NEGATIVE
BUN BLDV-MCNC: 50 MG/DL (ref 7–20)
CALCIUM SERPL-MCNC: 9.4 MG/DL (ref 8.3–10.6)
CHLORIDE BLD-SCNC: 101 MMOL/L (ref 99–110)
CLARITY: CLEAR
CO2: 24 MMOL/L (ref 21–32)
COLOR: YELLOW
CREAT SERPL-MCNC: 2 MG/DL (ref 0.6–1.2)
EPITHELIAL CELLS, UA: 1 /HPF (ref 0–5)
GFR AFRICAN AMERICAN: 29
GFR NON-AFRICAN AMERICAN: 24
GLUCOSE BLD-MCNC: 103 MG/DL (ref 70–99)
GLUCOSE URINE: NEGATIVE MG/DL
HYALINE CASTS: 7 /LPF (ref 0–8)
KETONES, URINE: NEGATIVE MG/DL
LEUKOCYTE ESTERASE, URINE: ABNORMAL
MAGNESIUM: 2.3 MG/DL (ref 1.8–2.4)
MICROSCOPIC EXAMINATION: YES
NITRITE, URINE: NEGATIVE
PH UA: 5.5 (ref 5–8)
POTASSIUM SERPL-SCNC: 4.1 MMOL/L (ref 3.5–5.1)
PRO-BNP: 5866 PG/ML (ref 0–449)
PROTEIN UA: 30 MG/DL
RBC UA: 2 /HPF (ref 0–4)
SODIUM BLD-SCNC: 139 MMOL/L (ref 136–145)
SPECIFIC GRAVITY UA: 1.02 (ref 1–1.03)
URINE TYPE: ABNORMAL
UROBILINOGEN, URINE: 0.2 E.U./DL
WBC UA: 9 /HPF (ref 0–5)

## 2019-11-16 PROCEDURE — 6370000000 HC RX 637 (ALT 250 FOR IP): Performed by: HOSPITALIST

## 2019-11-16 PROCEDURE — 82436 ASSAY OF URINE CHLORIDE: CPT

## 2019-11-16 PROCEDURE — 97162 PT EVAL MOD COMPLEX 30 MIN: CPT

## 2019-11-16 PROCEDURE — 84133 ASSAY OF URINE POTASSIUM: CPT

## 2019-11-16 PROCEDURE — 97530 THERAPEUTIC ACTIVITIES: CPT

## 2019-11-16 PROCEDURE — 71046 X-RAY EXAM CHEST 2 VIEWS: CPT

## 2019-11-16 PROCEDURE — 36415 COLL VENOUS BLD VENIPUNCTURE: CPT

## 2019-11-16 PROCEDURE — 99233 SBSQ HOSP IP/OBS HIGH 50: CPT | Performed by: INTERNAL MEDICINE

## 2019-11-16 PROCEDURE — 2580000003 HC RX 258: Performed by: HOSPITALIST

## 2019-11-16 PROCEDURE — 82570 ASSAY OF URINE CREATININE: CPT

## 2019-11-16 PROCEDURE — 6360000002 HC RX W HCPCS: Performed by: HOSPITALIST

## 2019-11-16 PROCEDURE — 97116 GAIT TRAINING THERAPY: CPT

## 2019-11-16 PROCEDURE — 80048 BASIC METABOLIC PNL TOTAL CA: CPT

## 2019-11-16 PROCEDURE — 83735 ASSAY OF MAGNESIUM: CPT

## 2019-11-16 PROCEDURE — 81001 URINALYSIS AUTO W/SCOPE: CPT

## 2019-11-16 PROCEDURE — 83880 ASSAY OF NATRIURETIC PEPTIDE: CPT

## 2019-11-16 PROCEDURE — 84540 ASSAY OF URINE/UREA-N: CPT

## 2019-11-16 PROCEDURE — 97166 OT EVAL MOD COMPLEX 45 MIN: CPT

## 2019-11-16 PROCEDURE — 84300 ASSAY OF URINE SODIUM: CPT

## 2019-11-16 PROCEDURE — 94760 N-INVAS EAR/PLS OXIMETRY 1: CPT

## 2019-11-16 PROCEDURE — 97535 SELF CARE MNGMENT TRAINING: CPT

## 2019-11-16 PROCEDURE — 1200000000 HC SEMI PRIVATE

## 2019-11-16 RX ORDER — LIDOCAINE 4 G/G
1 PATCH TOPICAL DAILY
Status: DISCONTINUED | OUTPATIENT
Start: 2019-11-16 | End: 2019-11-19 | Stop reason: HOSPADM

## 2019-11-16 RX ORDER — ALLOPURINOL 100 MG/1
100 TABLET ORAL DAILY
Status: DISCONTINUED | OUTPATIENT
Start: 2019-11-17 | End: 2019-11-19 | Stop reason: HOSPADM

## 2019-11-16 RX ORDER — LEVOTHYROXINE SODIUM 112 UG/1
112 TABLET ORAL DAILY
Status: DISCONTINUED | OUTPATIENT
Start: 2019-11-17 | End: 2019-11-19 | Stop reason: HOSPADM

## 2019-11-16 RX ORDER — PREGABALIN 25 MG/1
25 CAPSULE ORAL 2 TIMES DAILY
Status: DISCONTINUED | OUTPATIENT
Start: 2019-11-16 | End: 2019-11-19 | Stop reason: HOSPADM

## 2019-11-16 RX ADMIN — PREGABALIN 25 MG: 25 CAPSULE ORAL at 11:52

## 2019-11-16 RX ADMIN — TOPIRAMATE 100 MG: 100 TABLET, FILM COATED ORAL at 20:24

## 2019-11-16 RX ADMIN — Medication 10 ML: at 04:39

## 2019-11-16 RX ADMIN — MORPHINE SULFATE 2 MG: 2 INJECTION, SOLUTION INTRAMUSCULAR; INTRAVENOUS at 04:37

## 2019-11-16 RX ADMIN — Medication 10 ML: at 09:01

## 2019-11-16 RX ADMIN — ALLOPURINOL 400 MG: 300 TABLET ORAL at 09:00

## 2019-11-16 RX ADMIN — TOPIRAMATE 100 MG: 100 TABLET, FILM COATED ORAL at 09:00

## 2019-11-16 RX ADMIN — Medication 10 ML: at 20:24

## 2019-11-16 RX ADMIN — PREGABALIN 25 MG: 25 CAPSULE ORAL at 20:23

## 2019-11-16 RX ADMIN — RIVAROXABAN 15 MG: 15 TABLET, FILM COATED ORAL at 17:50

## 2019-11-16 RX ADMIN — METOPROLOL TARTRATE 100 MG: 50 TABLET, FILM COATED ORAL at 09:00

## 2019-11-16 RX ADMIN — CLOPIDOGREL 75 MG: 75 TABLET, FILM COATED ORAL at 09:00

## 2019-11-16 RX ADMIN — METOPROLOL TARTRATE 100 MG: 50 TABLET, FILM COATED ORAL at 20:24

## 2019-11-16 ASSESSMENT — PAIN SCALES - GENERAL
PAINLEVEL_OUTOF10: 0
PAINLEVEL_OUTOF10: 3
PAINLEVEL_OUTOF10: 0
PAINLEVEL_OUTOF10: 5

## 2019-11-16 ASSESSMENT — PAIN DESCRIPTION - PAIN TYPE
TYPE: CHRONIC PAIN
TYPE: ACUTE PAIN

## 2019-11-16 ASSESSMENT — PAIN DESCRIPTION - ORIENTATION: ORIENTATION: LEFT

## 2019-11-16 ASSESSMENT — PAIN DESCRIPTION - LOCATION
LOCATION: LEG
LOCATION: LEG

## 2019-11-17 LAB
ANION GAP SERPL CALCULATED.3IONS-SCNC: 15 MMOL/L (ref 3–16)
BUN BLDV-MCNC: 52 MG/DL (ref 7–20)
CALCIUM SERPL-MCNC: 9.3 MG/DL (ref 8.3–10.6)
CHLORIDE BLD-SCNC: 105 MMOL/L (ref 99–110)
CHLORIDE URINE RANDOM: <20 MMOL/L
CO2: 20 MMOL/L (ref 21–32)
CREAT SERPL-MCNC: 1.7 MG/DL (ref 0.6–1.2)
CREATININE URINE: 174.3 MG/DL (ref 28–259)
GFR AFRICAN AMERICAN: 35
GFR NON-AFRICAN AMERICAN: 29
GLUCOSE BLD-MCNC: 97 MG/DL (ref 70–99)
MAGNESIUM: 2.3 MG/DL (ref 1.8–2.4)
POTASSIUM SERPL-SCNC: 3.7 MMOL/L (ref 3.5–5.1)
POTASSIUM, UR: 56.7 MMOL/L
SODIUM BLD-SCNC: 140 MMOL/L (ref 136–145)
SODIUM URINE: 51 MMOL/L
UREA NITROGEN, UR: 822.1 MG/DL (ref 800–1666)

## 2019-11-17 PROCEDURE — 1200000000 HC SEMI PRIVATE

## 2019-11-17 PROCEDURE — 2580000003 HC RX 258: Performed by: HOSPITALIST

## 2019-11-17 PROCEDURE — 99233 SBSQ HOSP IP/OBS HIGH 50: CPT | Performed by: INTERNAL MEDICINE

## 2019-11-17 PROCEDURE — 94760 N-INVAS EAR/PLS OXIMETRY 1: CPT

## 2019-11-17 PROCEDURE — 6370000000 HC RX 637 (ALT 250 FOR IP): Performed by: INTERNAL MEDICINE

## 2019-11-17 PROCEDURE — 36415 COLL VENOUS BLD VENIPUNCTURE: CPT

## 2019-11-17 PROCEDURE — 80048 BASIC METABOLIC PNL TOTAL CA: CPT

## 2019-11-17 PROCEDURE — 6370000000 HC RX 637 (ALT 250 FOR IP): Performed by: HOSPITALIST

## 2019-11-17 PROCEDURE — 83735 ASSAY OF MAGNESIUM: CPT

## 2019-11-17 PROCEDURE — 6360000002 HC RX W HCPCS: Performed by: HOSPITALIST

## 2019-11-17 RX ORDER — TORSEMIDE 20 MG/1
20 TABLET ORAL DAILY
Qty: 30 TABLET | Refills: 0 | Status: SHIPPED | OUTPATIENT
Start: 2019-11-17 | End: 2019-11-19 | Stop reason: HOSPADM

## 2019-11-17 RX ORDER — PREGABALIN 25 MG/1
25 CAPSULE ORAL 2 TIMES DAILY
Qty: 60 CAPSULE | Refills: 0 | Status: SHIPPED | OUTPATIENT
Start: 2019-11-17 | End: 2019-11-22

## 2019-11-17 RX ADMIN — TOPIRAMATE 100 MG: 100 TABLET, FILM COATED ORAL at 08:52

## 2019-11-17 RX ADMIN — LEVOTHYROXINE SODIUM 112 MCG: 112 TABLET ORAL at 06:40

## 2019-11-17 RX ADMIN — METOPROLOL TARTRATE 100 MG: 50 TABLET, FILM COATED ORAL at 08:52

## 2019-11-17 RX ADMIN — Medication 10 ML: at 20:04

## 2019-11-17 RX ADMIN — TOPIRAMATE 100 MG: 100 TABLET, FILM COATED ORAL at 20:03

## 2019-11-17 RX ADMIN — RIVAROXABAN 15 MG: 15 TABLET, FILM COATED ORAL at 17:11

## 2019-11-17 RX ADMIN — METOPROLOL TARTRATE 100 MG: 50 TABLET, FILM COATED ORAL at 20:03

## 2019-11-17 RX ADMIN — Medication 10 ML: at 22:04

## 2019-11-17 RX ADMIN — MORPHINE SULFATE 2 MG: 2 INJECTION, SOLUTION INTRAMUSCULAR; INTRAVENOUS at 22:03

## 2019-11-17 RX ADMIN — CLOPIDOGREL 75 MG: 75 TABLET, FILM COATED ORAL at 08:52

## 2019-11-17 RX ADMIN — ALLOPURINOL 100 MG: 100 TABLET ORAL at 08:52

## 2019-11-17 RX ADMIN — PREGABALIN 25 MG: 25 CAPSULE ORAL at 20:03

## 2019-11-17 RX ADMIN — Medication 10 ML: at 08:53

## 2019-11-17 RX ADMIN — PREGABALIN 25 MG: 25 CAPSULE ORAL at 08:52

## 2019-11-17 ASSESSMENT — PAIN SCALES - GENERAL
PAINLEVEL_OUTOF10: 0
PAINLEVEL_OUTOF10: 7
PAINLEVEL_OUTOF10: 0

## 2019-11-17 ASSESSMENT — PAIN DESCRIPTION - PAIN TYPE: TYPE: CHRONIC PAIN

## 2019-11-17 ASSESSMENT — PAIN DESCRIPTION - LOCATION: LOCATION: LEG

## 2019-11-18 PROBLEM — I50.43 ACUTE ON CHRONIC COMBINED SYSTOLIC AND DIASTOLIC HEART FAILURE (HCC): Status: ACTIVE | Noted: 2019-11-15

## 2019-11-18 LAB
ANION GAP SERPL CALCULATED.3IONS-SCNC: 13 MMOL/L (ref 3–16)
BUN BLDV-MCNC: 47 MG/DL (ref 7–20)
CALCIUM SERPL-MCNC: 8.8 MG/DL (ref 8.3–10.6)
CHLORIDE BLD-SCNC: 107 MMOL/L (ref 99–110)
CO2: 19 MMOL/L (ref 21–32)
CREAT SERPL-MCNC: 1.6 MG/DL (ref 0.6–1.2)
GFR AFRICAN AMERICAN: 38
GFR NON-AFRICAN AMERICAN: 31
GLUCOSE BLD-MCNC: 104 MG/DL (ref 70–99)
MAGNESIUM: 2.5 MG/DL (ref 1.8–2.4)
POTASSIUM SERPL-SCNC: 3.9 MMOL/L (ref 3.5–5.1)
PRO-BNP: 2923 PG/ML (ref 0–449)
SODIUM BLD-SCNC: 139 MMOL/L (ref 136–145)

## 2019-11-18 PROCEDURE — 6370000000 HC RX 637 (ALT 250 FOR IP): Performed by: INTERNAL MEDICINE

## 2019-11-18 PROCEDURE — 80048 BASIC METABOLIC PNL TOTAL CA: CPT

## 2019-11-18 PROCEDURE — 6370000000 HC RX 637 (ALT 250 FOR IP): Performed by: HOSPITALIST

## 2019-11-18 PROCEDURE — 2580000003 HC RX 258: Performed by: INTERNAL MEDICINE

## 2019-11-18 PROCEDURE — 2580000003 HC RX 258: Performed by: HOSPITALIST

## 2019-11-18 PROCEDURE — 83735 ASSAY OF MAGNESIUM: CPT

## 2019-11-18 PROCEDURE — 83880 ASSAY OF NATRIURETIC PEPTIDE: CPT

## 2019-11-18 PROCEDURE — 36415 COLL VENOUS BLD VENIPUNCTURE: CPT

## 2019-11-18 PROCEDURE — 99232 SBSQ HOSP IP/OBS MODERATE 35: CPT | Performed by: CLINICAL NURSE SPECIALIST

## 2019-11-18 PROCEDURE — 6370000000 HC RX 637 (ALT 250 FOR IP): Performed by: CLINICAL NURSE SPECIALIST

## 2019-11-18 PROCEDURE — 1200000000 HC SEMI PRIVATE

## 2019-11-18 RX ORDER — TORSEMIDE 20 MG/1
20 TABLET ORAL DAILY
Status: DISCONTINUED | OUTPATIENT
Start: 2019-11-18 | End: 2019-11-19

## 2019-11-18 RX ORDER — LIDOCAINE 50 MG/G
1 PATCH TOPICAL DAILY
Qty: 10 PATCH | Refills: 0 | Status: SHIPPED | OUTPATIENT
Start: 2019-11-18 | End: 2019-11-22

## 2019-11-18 RX ADMIN — Medication 10 ML: at 09:11

## 2019-11-18 RX ADMIN — CLOPIDOGREL 75 MG: 75 TABLET, FILM COATED ORAL at 09:26

## 2019-11-18 RX ADMIN — TOPIRAMATE 100 MG: 100 TABLET, FILM COATED ORAL at 09:26

## 2019-11-18 RX ADMIN — PREGABALIN 25 MG: 25 CAPSULE ORAL at 09:25

## 2019-11-18 RX ADMIN — TOPIRAMATE 100 MG: 100 TABLET, FILM COATED ORAL at 21:51

## 2019-11-18 RX ADMIN — Medication 10 ML: at 21:51

## 2019-11-18 RX ADMIN — ALLOPURINOL 100 MG: 100 TABLET ORAL at 09:26

## 2019-11-18 RX ADMIN — METOPROLOL TARTRATE 100 MG: 50 TABLET, FILM COATED ORAL at 21:46

## 2019-11-18 RX ADMIN — Medication 10 ML: at 13:01

## 2019-11-18 RX ADMIN — TORSEMIDE 20 MG: 20 TABLET ORAL at 13:01

## 2019-11-18 RX ADMIN — LEVOTHYROXINE SODIUM 112 MCG: 112 TABLET ORAL at 06:02

## 2019-11-18 RX ADMIN — RIVAROXABAN 15 MG: 15 TABLET, FILM COATED ORAL at 19:07

## 2019-11-18 ASSESSMENT — PAIN SCALES - GENERAL
PAINLEVEL_OUTOF10: 0
PAINLEVEL_OUTOF10: 1
PAINLEVEL_OUTOF10: 0

## 2019-11-18 ASSESSMENT — PAIN DESCRIPTION - ORIENTATION: ORIENTATION: LEFT

## 2019-11-18 ASSESSMENT — PAIN DESCRIPTION - LOCATION: LOCATION: LEG

## 2019-11-18 ASSESSMENT — PAIN DESCRIPTION - PAIN TYPE: TYPE: CHRONIC PAIN

## 2019-11-19 VITALS
OXYGEN SATURATION: 94 % | RESPIRATION RATE: 16 BRPM | DIASTOLIC BLOOD PRESSURE: 55 MMHG | BODY MASS INDEX: 24.96 KG/M2 | SYSTOLIC BLOOD PRESSURE: 121 MMHG | TEMPERATURE: 97 F | HEIGHT: 63 IN | WEIGHT: 140.9 LBS | HEART RATE: 97 BPM

## 2019-11-19 LAB
ANION GAP SERPL CALCULATED.3IONS-SCNC: 15 MMOL/L (ref 3–16)
BUN BLDV-MCNC: 52 MG/DL (ref 7–20)
CALCIUM SERPL-MCNC: 8.8 MG/DL (ref 8.3–10.6)
CHLORIDE BLD-SCNC: 106 MMOL/L (ref 99–110)
CO2: 19 MMOL/L (ref 21–32)
CREAT SERPL-MCNC: 1.8 MG/DL (ref 0.6–1.2)
GFR AFRICAN AMERICAN: 33
GFR NON-AFRICAN AMERICAN: 27
GLUCOSE BLD-MCNC: 108 MG/DL (ref 70–99)
MAGNESIUM: 2.5 MG/DL (ref 1.8–2.4)
POTASSIUM SERPL-SCNC: 3.9 MMOL/L (ref 3.5–5.1)
SODIUM BLD-SCNC: 140 MMOL/L (ref 136–145)

## 2019-11-19 PROCEDURE — 97116 GAIT TRAINING THERAPY: CPT

## 2019-11-19 PROCEDURE — 6370000000 HC RX 637 (ALT 250 FOR IP): Performed by: INTERNAL MEDICINE

## 2019-11-19 PROCEDURE — 97530 THERAPEUTIC ACTIVITIES: CPT

## 2019-11-19 PROCEDURE — 80048 BASIC METABOLIC PNL TOTAL CA: CPT

## 2019-11-19 PROCEDURE — 94760 N-INVAS EAR/PLS OXIMETRY 1: CPT

## 2019-11-19 PROCEDURE — 6370000000 HC RX 637 (ALT 250 FOR IP): Performed by: HOSPITALIST

## 2019-11-19 PROCEDURE — 83735 ASSAY OF MAGNESIUM: CPT

## 2019-11-19 PROCEDURE — 99232 SBSQ HOSP IP/OBS MODERATE 35: CPT | Performed by: CLINICAL NURSE SPECIALIST

## 2019-11-19 RX ORDER — TORSEMIDE 20 MG/1
10 TABLET ORAL DAILY
Status: DISCONTINUED | OUTPATIENT
Start: 2019-11-19 | End: 2019-11-19 | Stop reason: HOSPADM

## 2019-11-19 RX ORDER — TORSEMIDE 10 MG/1
10 TABLET ORAL DAILY
Qty: 30 TABLET | Refills: 3 | Status: SHIPPED | OUTPATIENT
Start: 2019-11-19 | End: 2019-11-22 | Stop reason: SINTOL

## 2019-11-19 RX ADMIN — LEVOTHYROXINE SODIUM 112 MCG: 112 TABLET ORAL at 06:38

## 2019-11-19 RX ADMIN — HYDROCODONE BITARTRATE AND ACETAMINOPHEN 1 TABLET: 5; 325 TABLET ORAL at 02:53

## 2019-11-19 RX ADMIN — TOPIRAMATE 100 MG: 100 TABLET, FILM COATED ORAL at 10:16

## 2019-11-19 RX ADMIN — METOPROLOL TARTRATE 100 MG: 50 TABLET, FILM COATED ORAL at 10:16

## 2019-11-19 RX ADMIN — ALLOPURINOL 100 MG: 100 TABLET ORAL at 10:15

## 2019-11-19 RX ADMIN — PREGABALIN 25 MG: 25 CAPSULE ORAL at 10:15

## 2019-11-19 RX ADMIN — CLOPIDOGREL 75 MG: 75 TABLET, FILM COATED ORAL at 10:15

## 2019-11-19 RX ADMIN — TORSEMIDE 10 MG: 20 TABLET ORAL at 10:16

## 2019-11-19 ASSESSMENT — PAIN SCALES - GENERAL
PAINLEVEL_OUTOF10: 4
PAINLEVEL_OUTOF10: 0

## 2019-11-20 ENCOUNTER — OFFICE VISIT (OUTPATIENT)
Dept: CARDIOLOGY CLINIC | Age: 79
End: 2019-11-20
Payer: MEDICARE

## 2019-11-20 ENCOUNTER — CARE COORDINATION (OUTPATIENT)
Dept: CASE MANAGEMENT | Age: 79
End: 2019-11-20

## 2019-11-20 ENCOUNTER — TELEPHONE (OUTPATIENT)
Dept: OTHER | Age: 79
End: 2019-11-20

## 2019-11-20 ENCOUNTER — TELEPHONE (OUTPATIENT)
Dept: FAMILY MEDICINE CLINIC | Age: 79
End: 2019-11-20

## 2019-11-20 VITALS
HEART RATE: 70 BPM | DIASTOLIC BLOOD PRESSURE: 70 MMHG | BODY MASS INDEX: 24.63 KG/M2 | OXYGEN SATURATION: 99 % | HEIGHT: 63 IN | SYSTOLIC BLOOD PRESSURE: 118 MMHG | WEIGHT: 139 LBS

## 2019-11-20 DIAGNOSIS — R06.02 SHORTNESS OF BREATH: ICD-10-CM

## 2019-11-20 DIAGNOSIS — N28.9 RENAL INSUFFICIENCY: ICD-10-CM

## 2019-11-20 DIAGNOSIS — I10 ESSENTIAL HYPERTENSION, BENIGN: Chronic | ICD-10-CM

## 2019-11-20 DIAGNOSIS — I10 ESSENTIAL HYPERTENSION: Primary | ICD-10-CM

## 2019-11-20 DIAGNOSIS — Z95.0 PACEMAKER: ICD-10-CM

## 2019-11-20 DIAGNOSIS — I25.83 CORONARY ARTERY DISEASE DUE TO LIPID RICH PLAQUE: ICD-10-CM

## 2019-11-20 DIAGNOSIS — I25.10 CORONARY ARTERY DISEASE DUE TO LIPID RICH PLAQUE: ICD-10-CM

## 2019-11-20 PROCEDURE — 99214 OFFICE O/P EST MOD 30 MIN: CPT | Performed by: NURSE PRACTITIONER

## 2019-11-20 PROCEDURE — G8427 DOCREV CUR MEDS BY ELIG CLIN: HCPCS | Performed by: NURSE PRACTITIONER

## 2019-11-20 PROCEDURE — 1036F TOBACCO NON-USER: CPT | Performed by: NURSE PRACTITIONER

## 2019-11-20 PROCEDURE — G8598 ASA/ANTIPLAT THER USED: HCPCS | Performed by: NURSE PRACTITIONER

## 2019-11-20 PROCEDURE — 1123F ACP DISCUSS/DSCN MKR DOCD: CPT | Performed by: NURSE PRACTITIONER

## 2019-11-20 PROCEDURE — G8399 PT W/DXA RESULTS DOCUMENT: HCPCS | Performed by: NURSE PRACTITIONER

## 2019-11-20 PROCEDURE — 1111F DSCHRG MED/CURRENT MED MERGE: CPT | Performed by: NURSE PRACTITIONER

## 2019-11-20 PROCEDURE — G8482 FLU IMMUNIZE ORDER/ADMIN: HCPCS | Performed by: NURSE PRACTITIONER

## 2019-11-20 PROCEDURE — 1090F PRES/ABSN URINE INCON ASSESS: CPT | Performed by: NURSE PRACTITIONER

## 2019-11-20 PROCEDURE — 4040F PNEUMOC VAC/ADMIN/RCVD: CPT | Performed by: NURSE PRACTITIONER

## 2019-11-20 PROCEDURE — G8420 CALC BMI NORM PARAMETERS: HCPCS | Performed by: NURSE PRACTITIONER

## 2019-11-21 ENCOUNTER — CARE COORDINATION (OUTPATIENT)
Dept: CASE MANAGEMENT | Age: 79
End: 2019-11-21

## 2019-11-21 ENCOUNTER — HOSPITAL ENCOUNTER (OUTPATIENT)
Age: 79
Setting detail: SPECIMEN
Discharge: HOME | End: 2019-11-21
Payer: MEDICARE

## 2019-11-21 LAB
ANION GAP SERPL CALCULATED.3IONS-SCNC: 16 MMOL/L (ref 3–16)
BUN BLDV-MCNC: 69 MG/DL (ref 7–20)
CALCIUM SERPL-MCNC: 8.9 MG/DL (ref 8.3–10.6)
CHLORIDE BLD-SCNC: 105 MMOL/L (ref 99–110)
CO2: 21 MMOL/L (ref 21–32)
CREAT SERPL-MCNC: 2.5 MG/DL (ref 0.6–1.2)
GFR AFRICAN AMERICAN: 22
GFR NON-AFRICAN AMERICAN: 19
GLUCOSE BLD-MCNC: 94 MG/DL (ref 70–99)
POTASSIUM SERPL-SCNC: 4.8 MMOL/L (ref 3.5–5.1)
PRO-BNP: 2152 PG/ML (ref 0–449)
SODIUM BLD-SCNC: 142 MMOL/L (ref 136–145)

## 2019-11-21 PROCEDURE — 36415 COLL VENOUS BLD VENIPUNCTURE: CPT

## 2019-11-21 PROCEDURE — 80048 BASIC METABOLIC PNL TOTAL CA: CPT

## 2019-11-21 PROCEDURE — 83880 ASSAY OF NATRIURETIC PEPTIDE: CPT

## 2019-11-22 ENCOUNTER — TELEPHONE (OUTPATIENT)
Dept: CARDIOLOGY CLINIC | Age: 79
End: 2019-11-22

## 2019-11-22 ENCOUNTER — OFFICE VISIT (OUTPATIENT)
Dept: FAMILY MEDICINE CLINIC | Age: 79
End: 2019-11-22
Payer: MEDICARE

## 2019-11-22 VITALS
OXYGEN SATURATION: 96 % | HEART RATE: 67 BPM | BODY MASS INDEX: 24.91 KG/M2 | SYSTOLIC BLOOD PRESSURE: 120 MMHG | DIASTOLIC BLOOD PRESSURE: 62 MMHG | WEIGHT: 140.6 LBS

## 2019-11-22 DIAGNOSIS — J81.0 ACUTE PULMONARY EDEMA (HCC): ICD-10-CM

## 2019-11-22 DIAGNOSIS — I50.43 ACUTE ON CHRONIC COMBINED SYSTOLIC AND DIASTOLIC HEART FAILURE (HCC): Primary | ICD-10-CM

## 2019-11-22 DIAGNOSIS — G60.3 IDIOPATHIC PROGRESSIVE NEUROPATHY: ICD-10-CM

## 2019-11-22 DIAGNOSIS — N17.9 ACUTE RENAL FAILURE SUPERIMPOSED ON STAGE 3 CHRONIC KIDNEY DISEASE, UNSPECIFIED ACUTE RENAL FAILURE TYPE: ICD-10-CM

## 2019-11-22 DIAGNOSIS — N18.3 ACUTE RENAL FAILURE SUPERIMPOSED ON STAGE 3 CHRONIC KIDNEY DISEASE, UNSPECIFIED ACUTE RENAL FAILURE TYPE: ICD-10-CM

## 2019-11-22 DIAGNOSIS — M15.9 PRIMARY OSTEOARTHRITIS INVOLVING MULTIPLE JOINTS: ICD-10-CM

## 2019-11-22 DIAGNOSIS — H53.2 DIPLOPIA: ICD-10-CM

## 2019-11-22 PROBLEM — I73.9 PVD (PERIPHERAL VASCULAR DISEASE) (HCC): Status: RESOLVED | Noted: 2019-08-23 | Resolved: 2019-11-22

## 2019-11-22 PROBLEM — R06.02 SHORTNESS OF BREATH: Status: RESOLVED | Noted: 2019-07-17 | Resolved: 2019-11-22

## 2019-11-22 PROCEDURE — 99495 TRANSJ CARE MGMT MOD F2F 14D: CPT | Performed by: FAMILY MEDICINE

## 2019-11-22 RX ORDER — DULOXETIN HYDROCHLORIDE 30 MG/1
30 CAPSULE, DELAYED RELEASE ORAL DAILY
Qty: 30 CAPSULE | Refills: 5 | Status: SHIPPED | OUTPATIENT
Start: 2019-11-22 | End: 2019-12-04 | Stop reason: ALTCHOICE

## 2019-11-22 RX ORDER — HYDROCODONE BITARTRATE AND ACETAMINOPHEN 5; 325 MG/1; MG/1
1 TABLET ORAL 4 TIMES DAILY PRN
Qty: 120 TABLET | Refills: 0 | Status: SHIPPED | OUTPATIENT
Start: 2019-11-22 | End: 2020-01-10 | Stop reason: SDUPTHER

## 2019-11-26 ENCOUNTER — TELEPHONE (OUTPATIENT)
Dept: CARDIOLOGY CLINIC | Age: 79
End: 2019-11-26

## 2019-11-26 ENCOUNTER — CARE COORDINATION (OUTPATIENT)
Dept: CASE MANAGEMENT | Age: 79
End: 2019-11-26

## 2019-11-26 ENCOUNTER — HOSPITAL ENCOUNTER (OUTPATIENT)
Age: 79
Setting detail: SPECIMEN
Discharge: HOME | End: 2019-11-26
Payer: MEDICARE

## 2019-11-26 DIAGNOSIS — R06.02 SOB (SHORTNESS OF BREATH): Primary | ICD-10-CM

## 2019-11-26 LAB
ANION GAP SERPL CALCULATED.3IONS-SCNC: 11 MMOL/L (ref 3–16)
BUN BLDV-MCNC: 41 MG/DL (ref 7–20)
CALCIUM SERPL-MCNC: 9.2 MG/DL (ref 8.3–10.6)
CHLORIDE BLD-SCNC: 115 MMOL/L (ref 99–110)
CO2: 19 MMOL/L (ref 21–32)
CREAT SERPL-MCNC: 1.7 MG/DL (ref 0.6–1.2)
GFR AFRICAN AMERICAN: 35
GFR NON-AFRICAN AMERICAN: 29
GLUCOSE BLD-MCNC: 107 MG/DL (ref 70–99)
POTASSIUM SERPL-SCNC: 4.1 MMOL/L (ref 3.5–5.1)
PRO-BNP: 3397 PG/ML (ref 0–449)
SODIUM BLD-SCNC: 145 MMOL/L (ref 136–145)

## 2019-11-26 PROCEDURE — 83880 ASSAY OF NATRIURETIC PEPTIDE: CPT

## 2019-11-26 PROCEDURE — 36415 COLL VENOUS BLD VENIPUNCTURE: CPT

## 2019-11-26 PROCEDURE — 80048 BASIC METABOLIC PNL TOTAL CA: CPT

## 2019-12-02 RX ORDER — METOPROLOL TARTRATE 100 MG/1
100 TABLET ORAL 2 TIMES DAILY
Qty: 180 TABLET | Refills: 0 | Status: SHIPPED | OUTPATIENT
Start: 2019-12-02 | End: 2020-03-18 | Stop reason: SDUPTHER

## 2019-12-04 ENCOUNTER — CARE COORDINATION (OUTPATIENT)
Dept: CASE MANAGEMENT | Age: 79
End: 2019-12-04

## 2019-12-04 ENCOUNTER — OFFICE VISIT (OUTPATIENT)
Dept: CARDIOLOGY CLINIC | Age: 79
End: 2019-12-04
Payer: MEDICARE

## 2019-12-04 VITALS
SYSTOLIC BLOOD PRESSURE: 128 MMHG | HEART RATE: 60 BPM | WEIGHT: 142.8 LBS | HEIGHT: 63 IN | BODY MASS INDEX: 25.3 KG/M2 | DIASTOLIC BLOOD PRESSURE: 66 MMHG | OXYGEN SATURATION: 94 %

## 2019-12-04 DIAGNOSIS — I25.5 ISCHEMIC CARDIOMYOPATHY: ICD-10-CM

## 2019-12-04 DIAGNOSIS — I50.22 CHRONIC SYSTOLIC CONGESTIVE HEART FAILURE (HCC): Primary | ICD-10-CM

## 2019-12-04 DIAGNOSIS — I10 ESSENTIAL HYPERTENSION, BENIGN: Chronic | ICD-10-CM

## 2019-12-04 PROBLEM — I50.9 CONGESTIVE HEART FAILURE WITH LEFT VENTRICULAR DYSFUNCTION (HCC): Status: RESOLVED | Noted: 2019-07-22 | Resolved: 2019-12-04

## 2019-12-04 PROBLEM — I50.43 ACUTE ON CHRONIC COMBINED SYSTOLIC AND DIASTOLIC HEART FAILURE (HCC): Status: RESOLVED | Noted: 2019-11-15 | Resolved: 2019-12-04

## 2019-12-04 PROCEDURE — 99214 OFFICE O/P EST MOD 30 MIN: CPT | Performed by: NURSE PRACTITIONER

## 2019-12-04 PROCEDURE — G8399 PT W/DXA RESULTS DOCUMENT: HCPCS | Performed by: NURSE PRACTITIONER

## 2019-12-04 PROCEDURE — 1111F DSCHRG MED/CURRENT MED MERGE: CPT | Performed by: NURSE PRACTITIONER

## 2019-12-04 PROCEDURE — G8417 CALC BMI ABV UP PARAM F/U: HCPCS | Performed by: NURSE PRACTITIONER

## 2019-12-04 PROCEDURE — G8482 FLU IMMUNIZE ORDER/ADMIN: HCPCS | Performed by: NURSE PRACTITIONER

## 2019-12-04 PROCEDURE — G8598 ASA/ANTIPLAT THER USED: HCPCS | Performed by: NURSE PRACTITIONER

## 2019-12-04 PROCEDURE — 1090F PRES/ABSN URINE INCON ASSESS: CPT | Performed by: NURSE PRACTITIONER

## 2019-12-04 PROCEDURE — 4040F PNEUMOC VAC/ADMIN/RCVD: CPT | Performed by: NURSE PRACTITIONER

## 2019-12-04 PROCEDURE — G8427 DOCREV CUR MEDS BY ELIG CLIN: HCPCS | Performed by: NURSE PRACTITIONER

## 2019-12-04 PROCEDURE — 1123F ACP DISCUSS/DSCN MKR DOCD: CPT | Performed by: NURSE PRACTITIONER

## 2019-12-04 PROCEDURE — 1036F TOBACCO NON-USER: CPT | Performed by: NURSE PRACTITIONER

## 2019-12-04 RX ORDER — SPIRONOLACTONE 25 MG/1
12.5 TABLET ORAL DAILY
Qty: 30 TABLET | Refills: 3 | Status: SHIPPED | OUTPATIENT
Start: 2019-12-04 | End: 2019-12-17

## 2019-12-04 ASSESSMENT — ENCOUNTER SYMPTOMS
ABDOMINAL DISTENTION: 1
RESPIRATORY NEGATIVE: 1

## 2019-12-05 LAB
ANION GAP SERPL CALCULATED.3IONS-SCNC: 20 MMOL/L (ref 3–16)
BUN BLDV-MCNC: 40 MG/DL (ref 7–20)
CALCIUM SERPL-MCNC: 9.2 MG/DL (ref 8.3–10.6)
CHLORIDE BLD-SCNC: 109 MMOL/L (ref 99–110)
CO2: 16 MMOL/L (ref 21–32)
CREAT SERPL-MCNC: 1.9 MG/DL (ref 0.6–1.2)
GFR AFRICAN AMERICAN: 31
GFR NON-AFRICAN AMERICAN: 25
GLUCOSE BLD-MCNC: 122 MG/DL (ref 70–99)
POTASSIUM SERPL-SCNC: 4.5 MMOL/L (ref 3.5–5.1)
PRO-BNP: 4126 PG/ML (ref 0–449)
SODIUM BLD-SCNC: 145 MMOL/L (ref 136–145)

## 2019-12-12 ENCOUNTER — HOSPITAL ENCOUNTER (OUTPATIENT)
Age: 79
Setting detail: SPECIMEN
Discharge: HOME | End: 2019-12-12
Payer: MEDICARE

## 2019-12-12 LAB
ALBUMIN SERPL-MCNC: 3.7 G/DL (ref 3.4–5)
ANION GAP SERPL CALCULATED.3IONS-SCNC: 11 MMOL/L (ref 3–16)
BASOPHILS ABSOLUTE: 0.1 K/UL (ref 0–0.2)
BASOPHILS RELATIVE PERCENT: 0.9 %
BUN BLDV-MCNC: 37 MG/DL (ref 7–20)
CALCIUM SERPL-MCNC: 9.3 MG/DL (ref 8.3–10.6)
CHLORIDE BLD-SCNC: 112 MMOL/L (ref 99–110)
CO2: 22 MMOL/L (ref 21–32)
CREAT SERPL-MCNC: 1.7 MG/DL (ref 0.6–1.2)
CREATININE URINE: 146.3 MG/DL (ref 28–259)
EOSINOPHILS ABSOLUTE: 0.3 K/UL (ref 0–0.6)
EOSINOPHILS RELATIVE PERCENT: 4.1 %
GFR AFRICAN AMERICAN: 35
GFR NON-AFRICAN AMERICAN: 29
GLUCOSE BLD-MCNC: 102 MG/DL (ref 70–99)
HCT VFR BLD CALC: 42.1 % (ref 36–48)
HEMOGLOBIN: 12.9 G/DL (ref 12–16)
LYMPHOCYTES ABSOLUTE: 1.1 K/UL (ref 1–5.1)
LYMPHOCYTES RELATIVE PERCENT: 14 %
MCH RBC QN AUTO: 24.7 PG (ref 26–34)
MCHC RBC AUTO-ENTMCNC: 30.7 G/DL (ref 31–36)
MCV RBC AUTO: 80.5 FL (ref 80–100)
MONOCYTES ABSOLUTE: 0.5 K/UL (ref 0–1.3)
MONOCYTES RELATIVE PERCENT: 5.8 %
NEUTROPHILS ABSOLUTE: 6.1 K/UL (ref 1.7–7.7)
NEUTROPHILS RELATIVE PERCENT: 75.2 %
PDW BLD-RTO: 19 % (ref 12.4–15.4)
PHOSPHORUS: 3.7 MG/DL (ref 2.5–4.9)
PLATELET # BLD: 322 K/UL (ref 135–450)
PMV BLD AUTO: 9.6 FL (ref 5–10.5)
POTASSIUM SERPL-SCNC: 4.4 MMOL/L (ref 3.5–5.1)
PROTEIN PROTEIN: 42 MG/DL
RBC # BLD: 5.24 M/UL (ref 4–5.2)
SODIUM BLD-SCNC: 145 MMOL/L (ref 136–145)
WBC # BLD: 8.2 K/UL (ref 4–11)

## 2019-12-12 PROCEDURE — 82570 ASSAY OF URINE CREATININE: CPT

## 2019-12-12 PROCEDURE — 80069 RENAL FUNCTION PANEL: CPT

## 2019-12-12 PROCEDURE — 84156 ASSAY OF PROTEIN URINE: CPT

## 2019-12-12 PROCEDURE — 85025 COMPLETE CBC W/AUTO DIFF WBC: CPT

## 2019-12-12 PROCEDURE — 36415 COLL VENOUS BLD VENIPUNCTURE: CPT

## 2019-12-17 ENCOUNTER — NURSE ONLY (OUTPATIENT)
Dept: CARDIOLOGY CLINIC | Age: 79
End: 2019-12-17
Payer: MEDICARE

## 2019-12-17 DIAGNOSIS — Z95.0 PACEMAKER: ICD-10-CM

## 2019-12-17 DIAGNOSIS — I49.5 SICK SINUS SYNDROME (HCC): ICD-10-CM

## 2019-12-17 PROCEDURE — 93294 REM INTERROG EVL PM/LDLS PM: CPT | Performed by: INTERNAL MEDICINE

## 2019-12-17 PROCEDURE — 93296 REM INTERROG EVL PM/IDS: CPT | Performed by: INTERNAL MEDICINE

## 2019-12-30 ENCOUNTER — OFFICE VISIT (OUTPATIENT)
Dept: FAMILY MEDICINE CLINIC | Age: 79
End: 2019-12-30
Payer: MEDICARE

## 2019-12-30 ENCOUNTER — HOSPITAL ENCOUNTER (OUTPATIENT)
Age: 79
Discharge: HOME | End: 2019-12-30
Payer: MEDICARE

## 2019-12-30 VITALS
OXYGEN SATURATION: 98 % | HEART RATE: 150 BPM | SYSTOLIC BLOOD PRESSURE: 120 MMHG | WEIGHT: 140 LBS | BODY MASS INDEX: 24.8 KG/M2 | DIASTOLIC BLOOD PRESSURE: 82 MMHG

## 2019-12-30 DIAGNOSIS — I50.22 CHRONIC SYSTOLIC CONGESTIVE HEART FAILURE (HCC): ICD-10-CM

## 2019-12-30 DIAGNOSIS — E03.9 ACQUIRED HYPOTHYROIDISM: ICD-10-CM

## 2019-12-30 DIAGNOSIS — R06.02 SOB (SHORTNESS OF BREATH): ICD-10-CM

## 2019-12-30 DIAGNOSIS — N18.30 CHRONIC RENAL FAILURE, STAGE 3 (MODERATE) (HCC): ICD-10-CM

## 2019-12-30 DIAGNOSIS — I48.91 ATRIAL FIBRILLATION WITH RAPID VENTRICULAR RESPONSE (HCC): Primary | ICD-10-CM

## 2019-12-30 PROBLEM — I48.3 TYPICAL ATRIAL FLUTTER (HCC): Status: RESOLVED | Noted: 2017-08-02 | Resolved: 2019-12-30

## 2019-12-30 LAB
ANION GAP SERPL CALCULATED.3IONS-SCNC: 16 MMOL/L (ref 3–16)
BUN BLDV-MCNC: 33 MG/DL (ref 7–20)
CALCIUM SERPL-MCNC: 9.6 MG/DL (ref 8.3–10.6)
CHLORIDE BLD-SCNC: 107 MMOL/L (ref 99–110)
CO2: 21 MMOL/L (ref 21–32)
CREAT SERPL-MCNC: 1.7 MG/DL (ref 0.6–1.2)
GFR AFRICAN AMERICAN: 35
GFR NON-AFRICAN AMERICAN: 29
GLUCOSE BLD-MCNC: 95 MG/DL (ref 70–99)
POTASSIUM SERPL-SCNC: 4.6 MMOL/L (ref 3.5–5.1)
PRO-BNP: 5795 PG/ML (ref 0–449)
SODIUM BLD-SCNC: 144 MMOL/L (ref 136–145)
TSH SERPL DL<=0.05 MIU/L-ACNC: 2.36 UIU/ML (ref 0.27–4.2)

## 2019-12-30 PROCEDURE — 4040F PNEUMOC VAC/ADMIN/RCVD: CPT | Performed by: FAMILY MEDICINE

## 2019-12-30 PROCEDURE — 83880 ASSAY OF NATRIURETIC PEPTIDE: CPT

## 2019-12-30 PROCEDURE — 1090F PRES/ABSN URINE INCON ASSESS: CPT | Performed by: FAMILY MEDICINE

## 2019-12-30 PROCEDURE — 80048 BASIC METABOLIC PNL TOTAL CA: CPT

## 2019-12-30 PROCEDURE — 84443 ASSAY THYROID STIM HORMONE: CPT

## 2019-12-30 PROCEDURE — G8399 PT W/DXA RESULTS DOCUMENT: HCPCS | Performed by: FAMILY MEDICINE

## 2019-12-30 PROCEDURE — G8420 CALC BMI NORM PARAMETERS: HCPCS | Performed by: FAMILY MEDICINE

## 2019-12-30 PROCEDURE — G8482 FLU IMMUNIZE ORDER/ADMIN: HCPCS | Performed by: FAMILY MEDICINE

## 2019-12-30 PROCEDURE — 36415 COLL VENOUS BLD VENIPUNCTURE: CPT

## 2019-12-30 PROCEDURE — 1036F TOBACCO NON-USER: CPT | Performed by: FAMILY MEDICINE

## 2019-12-30 PROCEDURE — 99214 OFFICE O/P EST MOD 30 MIN: CPT | Performed by: FAMILY MEDICINE

## 2019-12-30 PROCEDURE — G8598 ASA/ANTIPLAT THER USED: HCPCS | Performed by: FAMILY MEDICINE

## 2019-12-30 PROCEDURE — 1123F ACP DISCUSS/DSCN MKR DOCD: CPT | Performed by: FAMILY MEDICINE

## 2019-12-30 PROCEDURE — G8427 DOCREV CUR MEDS BY ELIG CLIN: HCPCS | Performed by: FAMILY MEDICINE

## 2019-12-30 RX ORDER — SPIRONOLACTONE 25 MG/1
12.5 TABLET ORAL DAILY
Qty: 45 TABLET | Refills: 0 | Status: ON HOLD | OUTPATIENT
Start: 2019-12-30 | End: 2020-03-28 | Stop reason: HOSPADM

## 2019-12-30 RX ORDER — SPIRONOLACTONE 25 MG/1
12.5 TABLET ORAL DAILY
COMMUNITY
End: 2019-12-30 | Stop reason: SDUPTHER

## 2019-12-30 RX ORDER — TOPIRAMATE 100 MG/1
100 TABLET, FILM COATED ORAL 2 TIMES DAILY
Qty: 180 TABLET | Refills: 0 | Status: ON HOLD | OUTPATIENT
Start: 2019-12-30 | End: 2020-03-28 | Stop reason: HOSPADM

## 2020-01-02 ENCOUNTER — TELEPHONE (OUTPATIENT)
Dept: CARDIOLOGY CLINIC | Age: 80
End: 2020-01-02

## 2020-01-02 RX ORDER — TORSEMIDE 10 MG/1
20 TABLET ORAL DAILY
Qty: 60 TABLET | Refills: 1 | Status: SHIPPED | OUTPATIENT
Start: 2020-01-02 | End: 2020-01-16

## 2020-01-02 NOTE — TELEPHONE ENCOUNTER
Called and spoke with the patient and informed her of message below. She wanted to let NPKV to know that she has taken 2 pills of her Spironolactone 25 mg. She states that her weight today is 138 lbs, she is very sob, no chest pain. She would like to also let us know that she has been taking the Torsemide all the time.  Please advise thanks

## 2020-01-06 NOTE — PROGRESS NOTES
[address]   Electrophysiology   Date: 1/7/2020  I had the privilege of visiting Cely Son in the office. CC: Palpitation    HPI: Cely Son is a 78 y.o. history of Atrial fib and underwent cardioversion in 2008. She had recurrent Atrial fib and underwent RFCA on 4/24/09 by Dr. Gonsalo Ruiz. Amiodarone therapy was discussed with patient in 2014. Underwent dual chamber pacemaker ([employer]) on 11/8/13 for sick sinus syndrome , and AV block reported at [facility].      She has significant coronary disease with history of CABG with multiple interventions in the past. On 2/2/17 she had a LHC which showed chronic occlusion of LAD and has seen interventional cardiology for potential intervention. PFTs showed severe obstructive disease, breathing improved after administering inhaler. She was seen on 8/2/17 and reported having palpitations and chest pain for one month. ECG was done which showed atrial flutter rate in the 140's. She is anticoagulated with Xarelto 15 mg daily. She underwent cardioversion on 8/2/17.       Pacemaker interrogation AP 79%  57% 9 months remaining battery 1/7/20    Interval History:   Eleazar Naik presents to the office to discuss treatment of her atrial tachycardia. She states she is not symptomatic anymore with these episodes. She had a PFT that showed severe restrictive disease and she has not followed up with pulmonology. We encouraged her to see Elvia Srivastava. She was a previous smoker with suspected COPD and amiodarone is not an option for rate control. We discussed ablation of her atrial tachycardia. Her daughter is with her today. She states that she has had shortness of breath with activity. She has occasional fluttering.   She states that she had palpitation frequently in the past.    Past Medical History:   Diagnosis Date    Allergic rhinitis, cause unspecified     Arthritis     Atrial fibrillation ([address]     Bronchopneumonia     CAD (coronary artery disease)     stent:  post cataract surgery (CABG)    Cerebral artery occlusion with cerebral infarction ([facility]     TIA    Chronic gouty arthropathy     Chronic kidney disease     Congestive heart failure, unspecified     Degeneration of cervical intervertebral disc     Essential and other specified forms of tremor     Gout     HIGH CHOLESTEROL     History of CVA (cerebrovascular accident)     Hx of blood clots     Hypertension     Influenza 12/23/2017    Mitral valve stenosis and aortic valve insufficiency     Movement disorder     back problems    Pacemaker     Peptic ulcer, unspecified site, unspecified as acute or chronic, without mention of hemorrhage, perforation, or obstruction     Thyroid disease     Unspecified disorder of kidney and ureter     Unspecified hypothyroidism         Past Surgical History:   Procedure Laterality Date    BACK SURGERY      CARDIAC CATHETERIZATION  7/2012    CARDIAC CATHETERIZATION  08/07/2018    Unsuccesful  of RCA    CARDIAC SURGERY      CABG & Cardiac ablation    CHOLECYSTECTOMY      CORONARY ARTERY BYPASS GRAFT  1987    LIMA- Diag/LAD, SVG- RCA    FEMORAL BYPASS Left 8/22/2019    LEFT FEMORAL TO POPLITEAL BYPASS GRAFT performed by Antonio Farmer MD at [address] [id number] FEMORAL-FEMORAL BYPASS GRAFT N/A 8/22/2019    FEMORAL TO FEMORAL BYPASS performed by Antonio Farmer MD at [address] [address] Left 03/16/2017    Left hip pinning    JOINT REPLACEMENT      [place] INJECT ANES/STEROID FORAMEN LUMBAR/SACRAL W IMG GUIDE ,1 LEVEL Right 12/3/2018    RIGHT L3 AND L4 LUMBAR TRANSFORAMINAL EPIRUAL STEROID INJECTION WITH FLUOROSCOPY performed by John Alvarez MD at [facility]  11/04/2014    MARLENY - 3.0 x 28 to the Ist Diag    SHOULDER SURGERY      left       Allergies   Allergen Reactions    Aspirin Nausea Only    Ativan [Lorazepam] Other (See Comments)     hallucinations    Diltiazem Anaphylaxis    Sulfa Antibiotics Rash and Hives    Dabigatran Nausea Only     And indigestion  And indigestion    Aka Pradaxa    Lipitor [Atorvastatin] Other (See Comments)     Muscle pains    Mysoline [Primidone]     Nsaids     Other Other (See Comments)     Nitroglycerin patches causes severe headaches       Social History:  Reviewed. reports that she quit smoking about 33 years ago. Her smoking use included cigarettes. She has a 28.00 pack-year smoking history. She has never used smokeless tobacco. She reports previous alcohol use. She reports that she does not use drugs. Family History:  Reviewed. family history includes Cancer in her maternal grandmother, paternal grandmother, and sister; Diabetes in her brother and maternal uncle; Heart Disease in her brother, maternal aunt, and sister; Hypertension in her brother and paternal aunt; Stroke in her maternal aunt. Review of System:  All other systems reviewed Pertinent negatives and positives are:     General: - fever, - chills + Fatigue   Ophthalmic ROS: - eye pain or loss of vision  ENT - headaches, - sore throat   Respiratory: - cough, - sputum + shortness of breath with activity  Cardiovascular: Reviewed in HPI  Gastrointestinal: - abdominal pain, - diarrhea, - N/V   Hematology: - bleeding, - blood clots, - bruising - jaundice     Genito-Urinary:  - Dysuria or incontinence    Musculoskeletal: - Joint swelling, - muscle pain    Neurological: - confusion, - dizziness, - headaches   Psychiatric: - anxiety, - depression   Dermatological: - rash      Physical Examination:     Vitals:    01/07/20 0836   BP: 136/73   Pulse: 114   Resp: 18       Wt Readings from Last 3 Encounters:   01/07/20 138 lb (62.6 kg)   12/30/19 140 lb (63.5 kg)   12/17/19 139 lb (63 kg)     · Constitutional: Oriented. No distress. · Head: Normocephalic and atraumatic. · Mouth/Throat: Oropharynx is clear and moist.   · Eyes: Conjunctivae normal. EOM are normal.   · Neck: Neck supple. No JVD present. · Cardiovascular: Normal rate, regular rhythm, S1&S2. · Pulmonary/Chest: Bilateral respiratory sounds. No rhonchi. Scattered crackles  · Abdominal: Soft. No tenderness. · Musculoskeletal: No tenderness. No edema    · Lymphadenopathy: Has no cervical adenopathy. · Neurological: Alert and oriented. Follows command, No Gross deficit   · Skin: Skin is warm, No rash noted. · Psychiatric: Has a normal behavior       Labs:  Lab Results   Component Value Date    TSHREFLEX 0.06 2018    TSH 2.36 2019    TSH 0.751 2019    CREATININE 1.7 2019    CREATININE 1.7 2019    AST 17 11/15/2019    ALT 13 11/15/2019       Reviewed. EC20  Inappropriate capture Apaced    19  Procedure:     [facility]  Indication:      nstemi  Consent:        Verbal and/or written consent obtained prior to procedure. Sedation:        Minimal conscious sedation utilized for comfort. Complic:         None  EBL:                <10cc  Specimens:    None  Fluoro:            6.4 min  Contrast:        43 cc  Access:          RCFA  Findings:                      LM       Normal              LAD     50% ostial, mid 100%              Cx        40% OM1 at bifurcation              RCA     Mid 100%              LVG     Not performed              EDP     15 mmHg              L-LAD  Patent              S-PDA Known occluded  Severe Ca++:None  Post Cath Dx:Stable CAD, no apparent culprit for NSTEMI  Intervention:  None  Med Rec:        Continue aggressive med tx                          Continue plavix, no asa due to allergy    Echo: 16 ([facility])  Conclusions: Normal LV size and systolic function Mild to moderate mitral  regurgitation Mild tricuspid regurgitation  Findings:   Left Atrium: Mild to moderate enlargement of the left atrium. Left Ventricle: Upper limits of normal left ventricle size. No left ventricular  hypertrophy. Normal left ventricular systolic function.  The ejection fraction   Is visually plan and the patient verbalized understanding and agreed with the plan. NOTE: This report was transcribed using voice recognition software. Every effort was made to ensure accuracy, however, inadvertent computerized transcription errors may be present. Francesco Colvin MD, MPH  [place] [address]   Office: [phone]     Scribe attestation: This note was scribed in the presence of Francesco Colvin MD by Cathryn King RN  Physician Attestation: I, Dr. Francesco Colvin, confirm that the scribe's documentation has been prepared under my direction and personally reviewed by me in its entirety. I also confirm that the note above accurately reflects all work, treatment, procedures, and medical decision making performed by me.

## 2020-01-07 ENCOUNTER — NURSE ONLY (OUTPATIENT)
Dept: CARDIOLOGY CLINIC | Age: 80
End: 2020-01-07
Payer: MEDICARE

## 2020-01-07 ENCOUNTER — OFFICE VISIT (OUTPATIENT)
Dept: CARDIOLOGY CLINIC | Age: 80
End: 2020-01-07
Payer: MEDICARE

## 2020-01-07 VITALS
HEART RATE: 114 BPM | DIASTOLIC BLOOD PRESSURE: 73 MMHG | SYSTOLIC BLOOD PRESSURE: 136 MMHG | BODY MASS INDEX: 24.45 KG/M2 | WEIGHT: 138 LBS | HEIGHT: 63 IN | RESPIRATION RATE: 18 BRPM

## 2020-01-07 PROCEDURE — 99214 OFFICE O/P EST MOD 30 MIN: CPT | Performed by: INTERNAL MEDICINE

## 2020-01-07 PROCEDURE — 4040F PNEUMOC VAC/ADMIN/RCVD: CPT | Performed by: INTERNAL MEDICINE

## 2020-01-07 PROCEDURE — G8427 DOCREV CUR MEDS BY ELIG CLIN: HCPCS | Performed by: INTERNAL MEDICINE

## 2020-01-07 PROCEDURE — G8420 CALC BMI NORM PARAMETERS: HCPCS | Performed by: INTERNAL MEDICINE

## 2020-01-07 PROCEDURE — 1090F PRES/ABSN URINE INCON ASSESS: CPT | Performed by: INTERNAL MEDICINE

## 2020-01-07 PROCEDURE — 93000 ELECTROCARDIOGRAM COMPLETE: CPT | Performed by: INTERNAL MEDICINE

## 2020-01-07 PROCEDURE — 1123F ACP DISCUSS/DSCN MKR DOCD: CPT | Performed by: INTERNAL MEDICINE

## 2020-01-07 PROCEDURE — 1036F TOBACCO NON-USER: CPT | Performed by: INTERNAL MEDICINE

## 2020-01-07 PROCEDURE — G8482 FLU IMMUNIZE ORDER/ADMIN: HCPCS | Performed by: INTERNAL MEDICINE

## 2020-01-07 PROCEDURE — 93280 PM DEVICE PROGR EVAL DUAL: CPT | Performed by: INTERNAL MEDICINE

## 2020-01-07 PROCEDURE — G8399 PT W/DXA RESULTS DOCUMENT: HCPCS | Performed by: INTERNAL MEDICINE

## 2020-01-09 ENCOUNTER — TELEPHONE (OUTPATIENT)
Dept: ADMINISTRATIVE | Age: 80
End: 2020-01-09

## 2020-01-09 NOTE — TELEPHONE ENCOUNTER
Patient is needing a refill on HYDROCODONE she is COMP OUT!      She uses [facility] on [address]

## 2020-01-10 ENCOUNTER — OFFICE VISIT (OUTPATIENT)
Dept: FAMILY MEDICINE CLINIC | Age: 80
End: 2020-01-10
Payer: MEDICARE

## 2020-01-10 VITALS
WEIGHT: 138.4 LBS | BODY MASS INDEX: 24.52 KG/M2 | DIASTOLIC BLOOD PRESSURE: 90 MMHG | OXYGEN SATURATION: 98 % | SYSTOLIC BLOOD PRESSURE: 132 MMHG | HEART RATE: 68 BPM

## 2020-01-10 PROCEDURE — 1036F TOBACCO NON-USER: CPT | Performed by: FAMILY MEDICINE

## 2020-01-10 PROCEDURE — 1090F PRES/ABSN URINE INCON ASSESS: CPT | Performed by: FAMILY MEDICINE

## 2020-01-10 PROCEDURE — G8420 CALC BMI NORM PARAMETERS: HCPCS | Performed by: FAMILY MEDICINE

## 2020-01-10 PROCEDURE — G8482 FLU IMMUNIZE ORDER/ADMIN: HCPCS | Performed by: FAMILY MEDICINE

## 2020-01-10 PROCEDURE — 1123F ACP DISCUSS/DSCN MKR DOCD: CPT | Performed by: FAMILY MEDICINE

## 2020-01-10 PROCEDURE — 4040F PNEUMOC VAC/ADMIN/RCVD: CPT | Performed by: FAMILY MEDICINE

## 2020-01-10 PROCEDURE — 99214 OFFICE O/P EST MOD 30 MIN: CPT | Performed by: FAMILY MEDICINE

## 2020-01-10 PROCEDURE — G8399 PT W/DXA RESULTS DOCUMENT: HCPCS | Performed by: FAMILY MEDICINE

## 2020-01-10 PROCEDURE — G8428 CUR MEDS NOT DOCUMENT: HCPCS | Performed by: FAMILY MEDICINE

## 2020-01-10 RX ORDER — HYDROCODONE BITARTRATE AND ACETAMINOPHEN 5; 325 MG/1; MG/1
1 TABLET ORAL 4 TIMES DAILY PRN
Qty: 120 TABLET | Refills: 0 | Status: SHIPPED | OUTPATIENT
Start: 2020-01-10 | End: 2020-04-06 | Stop reason: SDUPTHER

## 2020-01-10 RX ORDER — PREDNISONE 20 MG/1
TABLET ORAL
Qty: 20 TABLET | Refills: 0 | Status: SHIPPED | OUTPATIENT
Start: 2020-01-10 | End: 2020-02-03

## 2020-01-10 RX ORDER — DULOXETIN HYDROCHLORIDE 30 MG/1
30 CAPSULE, DELAYED RELEASE ORAL DAILY
Qty: 30 CAPSULE | Refills: 5 | Status: SHIPPED | OUTPATIENT
Start: 2020-01-10 | End: 2020-02-03

## 2020-01-10 NOTE — PROGRESS NOTES
Subjective:      Patient ID: Humphrey Peterson is a 78 y.o. female. CC: Patient presents for acute medical problem-severe left leg pain with walking and burning in both her feet. . Medical assistant notes reviewed. HPI Patient presents with left leg pain since August.  The pain is getting worse where she has limp on that leg. She states the pain is better when sitting down. She is having significant difficulty ambulate around the house. The pain will start in her calf and extend up into her leg. She had a prior MRI scan and noted lumbar disc disease on the right side that was treated with an epidural injection the MRI scan also demonstrated disc disease on the left side. She is also describing burning sensation in both her feet that is especially worse at nighttime she has hyper paresthesia of both lower extremities as well. She is not sure she has lost any strength. She denies any issues with bowel or bladder. Review of Systems     Allergies   Allergen Reactions    Aspirin Nausea Only    Ativan [Lorazepam] Other (See Comments)     hallucinations    Diltiazem Anaphylaxis    Sulfa Antibiotics Rash and Hives    Dabigatran Nausea Only     And indigestion  And indigestion    Aka Pradaxa    Lipitor [Atorvastatin] Other (See Comments)     Muscle pains    Mysoline [Primidone]     Nsaids     Other Other (See Comments)     Nitroglycerin patches causes severe headaches       Objective:   Physical Exam  Constitutional:       General: She is not in acute distress. Appearance: She is well-developed. Musculoskeletal:      Lumbar back: She exhibits spasm. She exhibits no swelling, no edema and no deformity. Right upper leg: Normal. She exhibits no tenderness, no swelling and no deformity. Left upper leg: Normal. She exhibits no tenderness, no swelling and no deformity. Skin:     General: Skin is warm and dry. Findings: No rash.    Neurological:      Mental Status: She is alert and oriented to person, place, and time. Sensory: Sensation is intact. Deep Tendon Reflexes:      Reflex Scores:       Patellar reflexes are 2+ on the right side and 2+ on the left side. Achilles reflexes are 2+ on the right side and 2+ on the left side. Comments: Hypertensive to touch in both lower extremities   Psychiatric:         Behavior: Behavior is cooperative. Back Exam     Tenderness   The patient is experiencing tenderness in the lumbar. Range of Motion   Extension: abnormal   Flexion: normal   Lateral bend right: normal   Lateral bend left: normal     Muscle Strength   Right Quadriceps:  5/5/5   Left Quadriceps:  5/5/5   Right Hamstrings:  5/5/5   Left Hamstrings:  5/5/5     Tests   Straight leg raise right: negative  Straight leg raise left: positive    Other   Gait: normal             Assessment:      Janny was seen today for leg pain. Diagnoses and all orders for this visit:    Spinal stenosis of lumbar region with neurogenic claudication    Idiopathic peripheral neuropathy  -     Haley Roberto MD, Physical Medicine and Rehabilitation, [facility] - [facility]    Primary osteoarthritis involving multiple joints  -     HYDROcodone-acetaminophen (NORCO) 5-325 MG per tablet; Take 1 tablet by mouth 4 times daily as needed for Pain for up to 30 days. Acute lumbar radiculopathy    Other orders  -     predniSONE (DELTASONE) 20 MG tablet; 1 TID for 4 day then 1 BID  -     DULoxetine (CYMBALTA) 30 MG extended release capsule; Take 1 capsule by mouth daily    OARRS report checked        Plan:      Patient clinically obviously has lumbar radiculopathy and claudication. She has had a prior MRI scan and had a successful epidural injection several years ago. Will start with a prednisone burst over the weekend and patient was referred to Dr. Marco A Cooper for consultation and probable epidural injection. She also has peripheral neuropathy that is idiopathic.   Patient is in agreement to start

## 2020-01-15 ASSESSMENT — ENCOUNTER SYMPTOMS: RESPIRATORY NEGATIVE: 1

## 2020-01-15 NOTE — PROGRESS NOTES
[address]   Congestive Heart Failure    PrimaryCare Doctor:  Shawanda Reynaga MD  Primary Cardiologist: Ellie Padilla       Chief Complaint:  CHF    History of Present Illness:  Shruthi Miller is a 78 y.o. female with PMH CAD, CABG, TIA, CKD, PPM, HTN, AF, HLD, combined HF who presents today for CHF f/u.  OV 2 months ago: martha started  Today she c/o some dizziness, continued leg pain, and fatigue, but states abd distension- has resolved. she denies chest pain, dyspnea, palpitations, orthopnea, PND, exertional chest pressure/discomfort, edema, syncope. Wts are stable    ER Visit: No  Recent Hospitalization: Yes, Nov 2019 for  CHF - tors decreased at d/c for cr increase, ARB held    Baseline Weight: 136lb      EF: 45%  Cardiac Imaging: Echo 7/19/19  Left ventricle - normal size, thickness, reduced function with EF of 45%  LV wall motion - diffuse hypokinesis. Mitral valve - thickened, annular calcification, moderate regurgitation   Aortic valve - thickened, calcified, mild regurgitation  Tricuspid valve - mild regurgitation with PASP of 25mmHg   Pulmonic valve - trivial regurgitation   Biatrial enlargement  Pacemaker / ICD lead is visualized in the right heart. Cardiac angiogram:   6/19  NSTEMI: . Angiogram findings were as below:      Findings:                      LM       Normal              LAD     50% ostial, mid 100%              Cx        40% OM1 at bifurcation              RCA     Mid 100%              LVG     Not performed              EDP     15 mmHg              L-LAD  Patent              S-PDA Known occluded  Severe Ca++:None  Post Cath Dx:Stable CAD, no apparent culprit for NSTEMI  Intervention:  None  Med Rec:        Continue aggressive med tx                          Continue plavix, no asa due to allergy. statin added.  LDL 75   8/7/18  The PCI of complex proximal RCA chronic total occlusion was unsuccessful (J- score 3).  Underwent successful Angioplasty of high-grade proximal RCA lesion proximal to the . RECOMMENDATIONS:  Attempt on this complex long  was unsuccessful. Lack   Of retrograde collaterals along with a very ambiguous cap as well as a large RV marginal branch and bridging collateral coming off at the cap makes it a  challenging repeat attempt. If she continues to have angina, it is recommended to proceed with the single-vessel SVG to the RCA.     Echo: 2/17/16 ([facility])  Conclusions: Normal LV size and systolic function Mild to moderate mitral  regurgitation Mild tricuspid regurgitation  Findings:   Left At[facility]: Mild to moderate enlargement of the left atrium. Left Ventricle: Upper limits of normal left ventricle size. No left ventricular  hypertrophy. Normal left ventricular systolic function. The ejection fraction   Is visually estimated to be 55 %. Right Atrium: Normal right atrial size. RightVentricle: Normal right ventricle size. Normal right ventricular function. Aortic Valve: Tri-leaflet aortic valve. Mild aortic cusp calcification. No  aortic valve stenosis. Mild (1+) aortic valve regurgitation. Aorta: No dilatation of the aortic root. IVC/PA: Normal IVC size and normal respiratory  collapse consistent with normal right atrial pressure. Mitral Valve: Mild mitral valve leaflet calcification. Mild to moderate (2+) mitral valve  regurgitation. No mitral valve stenosis. Tricuspid Valve: Mild (1+) tricuspid  regurgitation. The pulmonary artery pressure is normal.   Pulmonic Valve: Mild  pulmonic regurgitation. Pericardium: Normal pericardium with no significant  pericardial effusion. Device: Yes     Activity: at baseline, low due to leg pain  Can you walk 1-2 blocks or do a moderate amount of house/yard work? No    NYHA Class: II     Sodium Restrictions: 3g  Fluid Restrictions: 48-64 oz/day  Sodium and fluid restriction compliance: fair with sodium, good with fluids    Pt Education: The patient has received education on the following topics: dietary Maternal Aunt     Heart Disease Maternal Aunt     Diabetes Maternal Uncle     Hypertension Paternal Aunt     Cancer Maternal Grandmother     Cancer Paternal Grandmother     Rheum Arthritis Neg Hx     Lupus Neg Hx        HomeMedications:  Prior to Admission medications    Medication Sig Start Date End Date Taking? Authorizing Provider   HYDROcodone-acetaminophen (NORCO) 5-325 MG per tablet Take 1 tablet by mouth 4 times daily as needed for Pain for up to 30 days.  1/10/20 2/9/20  Darby Merlin, MD   predniSONE (DELTASONE) 20 MG tablet 1 TID for 4 day then 1 BID 1/10/20   Darby Merlin, MD   DULoxetine (CYMBALTA) 30 MG extended release capsule Take 1 capsule by mouth daily 1/10/20   Darby Merlin, MD   torsemide [facility]) 10 MG tablet Take 2 tablets by mouth daily 1/2/20   TIFFANIE Klein CNP   topiramate (TOPAMAX) 100 MG tablet Take 1 tablet by mouth 2 times daily 12/30/19   Darby Merlin, MD   spironolactone (ALDACTONE) 25 MG tablet Take 0.5 tablets by mouth daily 12/30/19   Darby Merlin, MD   rosuvastatin (CRESTOR) 5 MG tablet Take 1 tablet by mouth daily     Historical Provider, MD   tiZANidine (ZANAFLEX) 4 MG tablet Take 4 mg by mouth every 6 hours as needed    Historical Provider, MD   vitamin D (ERGOCALCIFEROL) 1.25 MG (30447 UT) CAPS capsule Take 50,000 Units by mouth once a week    Historical Provider, MD   rivaroxaban (XARELTO) 15 MG TABS tablet Take 15 mg by mouth    Historical Provider, MD   metoprolol (LOPRESSOR) 100 MG tablet Take 1 tablet by mouth 2 times daily 12/2/19   Darby Merlin, MD   levothyroxine (SYNTHROID) 112 MCG tablet TAKE 1 TABLET DAILY 9/11/19   Darby Merlin, MD   allopurinol (ZYLOPRIM) 100 MG tablet Take 1 tablet by mouth daily Take along with 300mg tablet for total of 400mg. 9/11/19   Darby Merlin, MD   clopidogrel (PLAVIX) 75 MG tablet Take 1 tablet by mouth daily 9/11/19   Darby Merlin, MD   meclizine (ANTIVERT) 12.5 MG tablet Take 1

## 2020-01-16 ENCOUNTER — HOSPITAL ENCOUNTER (OUTPATIENT)
Age: 80
Discharge: HOME | End: 2020-01-16
Payer: MEDICARE

## 2020-01-16 ENCOUNTER — OFFICE VISIT (OUTPATIENT)
Dept: CARDIOLOGY CLINIC | Age: 80
End: 2020-01-16
Payer: MEDICARE

## 2020-01-16 ENCOUNTER — TELEPHONE (OUTPATIENT)
Dept: CARDIOLOGY CLINIC | Age: 80
End: 2020-01-16

## 2020-01-16 VITALS
HEIGHT: 63 IN | HEART RATE: 80 BPM | DIASTOLIC BLOOD PRESSURE: 70 MMHG | SYSTOLIC BLOOD PRESSURE: 122 MMHG | WEIGHT: 135.5 LBS | OXYGEN SATURATION: 95 % | BODY MASS INDEX: 24.01 KG/M2

## 2020-01-16 LAB
ANION GAP SERPL CALCULATED.3IONS-SCNC: 17 MMOL/L (ref 3–16)
BUN BLDV-MCNC: 61 MG/DL (ref 7–20)
CALCIUM SERPL-MCNC: 9.2 MG/DL (ref 8.3–10.6)
CHLORIDE BLD-SCNC: 103 MMOL/L (ref 99–110)
CO2: 21 MMOL/L (ref 21–32)
CREAT SERPL-MCNC: 1.8 MG/DL (ref 0.6–1.2)
GFR AFRICAN AMERICAN: 33
GFR NON-AFRICAN AMERICAN: 27
GLUCOSE BLD-MCNC: 111 MG/DL (ref 70–99)
POTASSIUM SERPL-SCNC: 4.1 MMOL/L (ref 3.5–5.1)
PRO-BNP: 5977 PG/ML (ref 0–449)
SODIUM BLD-SCNC: 141 MMOL/L (ref 136–145)

## 2020-01-16 PROCEDURE — G8427 DOCREV CUR MEDS BY ELIG CLIN: HCPCS | Performed by: NURSE PRACTITIONER

## 2020-01-16 PROCEDURE — 83880 ASSAY OF NATRIURETIC PEPTIDE: CPT

## 2020-01-16 PROCEDURE — G8482 FLU IMMUNIZE ORDER/ADMIN: HCPCS | Performed by: NURSE PRACTITIONER

## 2020-01-16 PROCEDURE — 80048 BASIC METABOLIC PNL TOTAL CA: CPT

## 2020-01-16 PROCEDURE — G8399 PT W/DXA RESULTS DOCUMENT: HCPCS | Performed by: NURSE PRACTITIONER

## 2020-01-16 PROCEDURE — 4040F PNEUMOC VAC/ADMIN/RCVD: CPT | Performed by: NURSE PRACTITIONER

## 2020-01-16 PROCEDURE — 99213 OFFICE O/P EST LOW 20 MIN: CPT | Performed by: NURSE PRACTITIONER

## 2020-01-16 PROCEDURE — G8420 CALC BMI NORM PARAMETERS: HCPCS | Performed by: NURSE PRACTITIONER

## 2020-01-16 PROCEDURE — 36415 COLL VENOUS BLD VENIPUNCTURE: CPT

## 2020-01-16 PROCEDURE — 1123F ACP DISCUSS/DSCN MKR DOCD: CPT | Performed by: NURSE PRACTITIONER

## 2020-01-16 PROCEDURE — 1036F TOBACCO NON-USER: CPT | Performed by: NURSE PRACTITIONER

## 2020-01-16 PROCEDURE — 1090F PRES/ABSN URINE INCON ASSESS: CPT | Performed by: NURSE PRACTITIONER

## 2020-01-16 RX ORDER — TORSEMIDE 10 MG/1
30 TABLET ORAL DAILY
Qty: 60 TABLET | Refills: 1 | Status: ON HOLD
Start: 2020-01-16 | End: 2020-03-28 | Stop reason: HOSPADM

## 2020-01-16 ASSESSMENT — ENCOUNTER SYMPTOMS: GASTROINTESTINAL NEGATIVE: 1

## 2020-01-21 ENCOUNTER — TELEPHONE (OUTPATIENT)
Dept: CARDIOLOGY CLINIC | Age: 80
End: 2020-01-21

## 2020-01-21 NOTE — TELEPHONE ENCOUNTER
[facility] Medication Refills:    Requested Prescriptions      No prescriptions requested or ordered in this encounter                     Last Office Visit: 01/16/2020  Next Office Visit:03/12/2020

## 2020-01-23 ENCOUNTER — TELEPHONE (OUTPATIENT)
Dept: FAMILY MEDICINE CLINIC | Age: 80
End: 2020-01-23

## 2020-01-23 ENCOUNTER — OFFICE VISIT (OUTPATIENT)
Dept: FAMILY MEDICINE CLINIC | Age: 80
End: 2020-01-23
Payer: MEDICARE

## 2020-01-23 VITALS
OXYGEN SATURATION: 96 % | BODY MASS INDEX: 24 KG/M2 | HEART RATE: 68 BPM | TEMPERATURE: 97.9 F | WEIGHT: 135.5 LBS | SYSTOLIC BLOOD PRESSURE: 118 MMHG | DIASTOLIC BLOOD PRESSURE: 74 MMHG

## 2020-01-23 PROCEDURE — 99213 OFFICE O/P EST LOW 20 MIN: CPT | Performed by: NURSE PRACTITIONER

## 2020-01-23 RX ORDER — DOXYCYCLINE HYCLATE 100 MG
100 TABLET ORAL 2 TIMES DAILY
Qty: 20 TABLET | Refills: 0 | Status: SHIPPED | OUTPATIENT
Start: 2020-01-23 | End: 2020-02-02

## 2020-01-23 ASSESSMENT — ENCOUNTER SYMPTOMS
DIARRHEA: 0
CONSTIPATION: 0
SHORTNESS OF BREATH: 0
NAUSEA: 0

## 2020-01-23 NOTE — PROGRESS NOTES
Zev Hughes  : 1940  Encounter date: 2020    This neva 78 y.o. female who presents with  Chief Complaint   Patient presents with    Other     c/o boil near vaginal area x 2 weeks. Patient states she has had one before. History of present illness:    HPI Pt is 78year old female reports boil on L groin that started several months ago but has worsened in 2 weeks. Pt reports post cardiac bypass from 2019 that went through same side of groin. PT reports treated for similar abscess by cardiologist in past.  Pt denies follow up with surgeon. Pt reports boil has drained and tender to touch. Denies temperatures. Pt has not applied anything topically. Current Outpatient Medications on File Prior to Visit   Medication Sig Dispense Refill    torsemide (DEMADEX) 10 MG tablet Take 3 tablets by mouth daily 60 tablet 1    HYDROcodone-acetaminophen (NORCO) 5-325 MG per tablet Take 1 tablet by mouth 4 times daily as needed for Pain for up to 30 days.  120 tablet 0    predniSONE (DELTASONE) 20 MG tablet 1 TID for 4 day then 1 BID 20 tablet 0    DULoxetine (CYMBALTA) 30 MG extended release capsule Take 1 capsule by mouth daily 30 capsule 5    topiramate (TOPAMAX) 100 MG tablet Take 1 tablet by mouth 2 times daily 180 tablet 0    spironolactone (ALDACTONE) 25 MG tablet Take 0.5 tablets by mouth daily 45 tablet 0    rosuvastatin (CRESTOR) 5 MG tablet Take 1 tablet by mouth daily       tiZANidine (ZANAFLEX) 4 MG tablet Take 4 mg by mouth every 6 hours as needed      vitamin D (ERGOCALCIFEROL) 1.25 MG (29768 UT) CAPS capsule Take 50,000 Units by mouth once a week      rivaroxaban (XARELTO) 15 MG TABS tablet Take 15 mg by mouth      metoprolol (LOPRESSOR) 100 MG tablet Take 1 tablet by mouth 2 times daily 180 tablet 0    levothyroxine (SYNTHROID) 112 MCG tablet TAKE 1 TABLET DAILY 90 tablet 1    allopurinol (ZYLOPRIM) 100 MG tablet Take 1 tablet by mouth daily Take along with 300mg tablet for urinating, hematuria, pelvic pain, vaginal bleeding and vaginal discharge. Skin: Positive for wound. Skin abscess right side groin, tender to touch, reports serous drainage   Hematological: Does not bruise/bleed easily. Objective:    /74 (Site: Right Upper Arm, Position: Sitting, Cuff Size: Medium Adult)   Pulse 68   Temp 97.9 °F (36.6 °C)   Wt 135 lb 8 oz (61.5 kg)   SpO2 96%   BMI 24.00 kg/m²   Weight: 135 lb 8 oz (61.5 kg)     BP Readings from Last 3 Encounters:   01/23/20 118/74   01/16/20 122/70   01/10/20 (!) 132/90     Wt Readings from Last 3 Encounters:   01/23/20 135 lb 8 oz (61.5 kg)   01/16/20 135 lb 8 oz (61.5 kg)   01/10/20 138 lb 6.4 oz (62.8 kg)     BMI Readings from Last 3 Encounters:   01/23/20 24.00 kg/m²   01/16/20 24.00 kg/m²   01/10/20 24.52 kg/m²       Physical Exam  Vitals signs reviewed. Constitutional:       Appearance: Normal appearance. She is well-developed. Cardiovascular:      Rate and Rhythm: Normal rate and regular rhythm. Heart sounds: Normal heart sounds. No murmur. Pulmonary:      Effort: Pulmonary effort is normal.      Breath sounds: Normal breath sounds. Abdominal:      General: Bowel sounds are normal.      Palpations: Abdomen is soft. Skin:     General: Skin is warm and dry. Findings: Lesion present. Neurological:      Mental Status: She is alert and oriented to person, place, and time. Assessment/Plan    1. Cutaneous abscess of perineum  Unable to obtain culture, dry to touch  Advised warm compress, do not manipulated  Loose fitting clothing, sit on pillow for comfort  Advised to monitor for signs of infection, high temperature, etc  Advised to follow up with cardiac surgeon  - doxycycline hyclate (VIBRA-TABS) 100 MG tablet; Take 1 tablet by mouth 2 times daily for 10 days  Dispense: 20 tablet;  Refill: 0  - Tayler Suero MD, General Surgery, [facility]      Return if symptoms worsen or fail

## 2020-01-24 ENCOUNTER — OFFICE VISIT (OUTPATIENT)
Dept: SURGERY | Age: 80
End: 2020-01-24
Payer: MEDICARE

## 2020-01-24 VITALS
BODY MASS INDEX: 24.63 KG/M2 | SYSTOLIC BLOOD PRESSURE: 122 MMHG | HEIGHT: 63 IN | DIASTOLIC BLOOD PRESSURE: 68 MMHG | WEIGHT: 139 LBS

## 2020-01-24 PROCEDURE — 46050 I&D PERIANAL ABSCESS SUPFC: CPT | Performed by: SURGERY

## 2020-01-24 ASSESSMENT — ENCOUNTER SYMPTOMS
VOICE CHANGE: 1
NAUSEA: 1
COLOR CHANGE: 1
SHORTNESS OF BREATH: 1
DIARRHEA: 1
BACK PAIN: 1
ABDOMINAL DISTENTION: 1

## 2020-01-24 NOTE — LETTER
[address] [address]  Phone: [phone]  Fax: [phone]    Frankie Clemons*        January 24, 2020       Patient: Lizbeth Elam   MR Number: [id number]   YOB: 1940   Date of Visit: 1/24/2020       Dear Shannon Reyes,    Thank you for the request for consultation for [facility]. Below are the relevant portions of my assessment and plan of care. Assessment:   Incision and Drainage       Preoperative diagnosis - abscess of the perianal region    Postoperative diagnosis - same    Procedure - incision and drainage of abscess of the perianal region    Surgeon - Ayaka Bull    Anesthesia - local    Culture taken -no    Operative indications and consent -78year old female with an abscess of the perianal region. The plan is for incision and drainage. The patient was explained the risks, benefits and possible complications. Details of the procedure - the patient was placed in the left lateral decubitus position and then prepped and draped in the usual sterile fashion. The skin and subcutaneous tissue was injected with 1% lidocaine. A 1 cm incision was made over the maximum point of fluctuance. The abscess was located on the left side anterior to the anal verge. It was drained by applying pressure surrounding the incision site. Once the abscess was well drained, hemostasis was achieved with pressure. A dressing was applied. The patient tolerated the procedure well. The abscess may represent a perirectal abscess versus an infected perineal sebaceous cyst                Plan:  Keep dry bandage over site. May shower. Finish course of doxycycline. Follow-up with me in 2 weeks. If you have questions, please do not hesitate to call me. I look forward to following Robinson Gonsalves along with you.     Sincerely,        Laurie Ac MD    CC providers:  Lynnwood Sandhoff, MD

## 2020-01-24 NOTE — COMMUNICATION BODY
Assessment:   Incision and Drainage       Preoperative diagnosis - abscess of the perianal region    Postoperative diagnosis - same    Procedure - incision and drainage of abscess of the perianal region    Surgeon - Allyn Perez    Anesthesia - local    Culture taken -no    Operative indications and consent -78year old female with an abscess of the perianal region. The plan is for incision and drainage. The patient was explained the risks, benefits and possible complications. Details of the procedure - the patient was placed in the left lateral decubitus position and then prepped and draped in the usual sterile fashion. The skin and subcutaneous tissue was injected with 1% lidocaine. A 1 cm incision was made over the maximum point of fluctuance. The abscess was located on the left side anterior to the anal verge. It was drained by applying pressure surrounding the incision site. Once the abscess was well drained, hemostasis was achieved with pressure. A dressing was applied. The patient tolerated the procedure well. The abscess may represent a perirectal abscess versus an infected perineal sebaceous cyst                Plan:  Keep dry bandage over site. May shower. Finish course of doxycycline. Follow-up with me in 2 weeks.

## 2020-01-24 NOTE — PROGRESS NOTES
:     Raina Ochoa is a 78 y.o. female     CC: perianal abscess    HPI: 77-year-old female with a perianal abscess      Past Medical History:   Diagnosis Date    Allergic rhinitis, cause unspecified     Arthritis     Atrial fibrillation ([address]     Bronchopneumonia     CAD (coronary artery disease)     stent:  post cataract surgery (CABG)    Cerebral artery occlusion with cerebral infarction (HCC)     TIA    Chronic gouty arthropathy     Chronic kidney disease     Congestive heart failure, unspecified     Degeneration of cervical intervertebral disc     Essential and other specified forms of tremor     Gout     HIGH CHOLESTEROL     History of CVA (cerebrovascular accident)     Hx of blood clots     Hypertension     Influenza 12/23/2017    Mitral valve stenosis and aortic valve insufficiency     Movement disorder     back problems    Pacemaker     Peptic ulcer, unspecified site, unspecified as acute or chronic, without mention of hemorrhage, perforation, or obstruction     Thyroid disease     Unspecified disorder of kidney and ureter     Unspecified hypothyroidism        Aspirin; Ativan [lorazepam]; Diltiazem; Sulfa antibiotics; Dabigatran; Lipitor [atorvastatin]; Mysoline [primidone];  Nsaids; and Other     Past Surgical History:   Procedure Laterality Date    BACK SURGERY      CARDIAC CATHETERIZATION  7/2012    CARDIAC CATHETERIZATION  08/07/2018    Unsuccesful  of RCA    CARDIAC SURGERY      CABG & Cardiac ablation    CHOLECYSTECTOMY      CORONARY ARTERY BYPASS GRAFT  1987    LIMA- Diag/LAD, SVG- RCA    FEMORAL BYPASS Left 8/22/2019    LEFT FEMORAL TO POPLITEAL BYPASS GRAFT performed by Aliyah Floyd MD at [place] [address] FEMORAL-FEMORAL BYPASS GRAFT N/A 8/22/2019    FEMORAL TO FEMORAL BYPASS performed by Aliyah Floyd MD at [facility] [place] [address] Left 03/16/2017    Left hip pinning    JOINT REPLACEMENT      CO INJECT ANES/STEROID FORAMEN LUMBAR/SACRAL W IMG GUIDE ,1

## 2020-01-31 LAB
ALBUMIN SERUM: 3.1 G/DL (ref 3.4–5)
ANION GAP SERPL CALCULATED.3IONS-SCNC: 8 MMOL/L (ref 7–16)
BUN BLDV-MCNC: 63 MG/DL (ref 7–18)
CALCIUM SERPL-MCNC: 9.5 MG/DL (ref 8.5–10.1)
CHLORIDE BLD-SCNC: 112 MMOL/L (ref 98–107)
CO2: 24 MMOL/L (ref 21–32)
CREATININE + EGFR PANEL: 2.1 MG/DL (ref 0.6–1.3)
CREATININE URINE: 53.3 MG/DL
GFR AFRICAN AMERICAN: 28 ML/MIN/1.73M2
GFR NON-AFRICAN AMERICAN: 23 ML/MIN/1.73M2
GLUCOSE: 104 MG/DL (ref 74–106)
HCT VFR BLD CALC: 49.5 % (ref 35.8–46.5)
HEMOGLOBIN: 14.9 G/DL (ref 12.1–15.8)
MCH RBC QN AUTO: 24 PG (ref 28.4–33.4)
MCHC RBC AUTO-ENTMCNC: 30 G/DL (ref 31.1–37)
MCV RBC AUTO: 79.7 FL (ref 85–99)
PARATHYROID HORMONE INTACT: 62.7 PG/ML (ref 12–88)
PDW BLD-RTO: 20.8 % (ref 11.7–15.2)
PHOSPHORUS: 4.3 MG/DL (ref 2.5–4.9)
PLATELET # BLD: 384 K/UL (ref 154–393)
POTASSIUM SERPL-SCNC: 4.5 MMOL/L (ref 3.5–5.1)
PROTEIN, URINE, RANDOM: 14 MG/DL
PROTEIN/CREAT RATIO URINE RAN: 0.3 MG/DL (ref 0–0.1)
RBC # BLD: 6.21 M/UL (ref 3.86–5.17)
SODIUM BLD-SCNC: 144 MMOL/L (ref 136–145)
VITAMIN D 25-HYDROXY: 69 NG/ML
WBC # BLD: 11.3 K/UL (ref 4–10.5)

## 2020-02-04 RX ORDER — FAMOTIDINE 20 MG/1
20 TABLET, FILM COATED ORAL 2 TIMES DAILY PRN
Qty: 30 TABLET | Refills: 0 | Status: SHIPPED | OUTPATIENT
Start: 2020-02-04 | End: 2020-04-30

## 2020-02-06 ENCOUNTER — APPOINTMENT (OUTPATIENT)
Dept: GENERAL RADIOLOGY | Age: 80
End: 2020-02-06
Payer: MEDICARE

## 2020-02-06 ENCOUNTER — APPOINTMENT (OUTPATIENT)
Dept: CT IMAGING | Age: 80
End: 2020-02-06
Payer: MEDICARE

## 2020-02-06 ENCOUNTER — HOSPITAL ENCOUNTER (OUTPATIENT)
Age: 80
Setting detail: OBSERVATION
Discharge: HOME | End: 2020-02-07
Attending: EMERGENCY MEDICINE | Admitting: INTERNAL MEDICINE
Payer: MEDICARE

## 2020-02-06 PROBLEM — R42 DIZZINESS: Status: ACTIVE | Noted: 2020-02-06

## 2020-02-06 LAB
A/G RATIO: 1.1 (ref 1.1–2.2)
ALBUMIN SERPL-MCNC: 3.2 G/DL (ref 3.4–5)
ALP BLD-CCNC: 101 U/L (ref 40–129)
ALT SERPL-CCNC: 12 U/L (ref 10–40)
ANION GAP SERPL CALCULATED.3IONS-SCNC: 11 MMOL/L (ref 3–16)
ANISOCYTOSIS: ABNORMAL
APTT: 39.4 SEC (ref 24.2–36.2)
AST SERPL-CCNC: 16 U/L (ref 15–37)
BASOPHILS ABSOLUTE: 0.1 K/UL (ref 0–0.2)
BASOPHILS RELATIVE PERCENT: 1.1 %
BILIRUB SERPL-MCNC: <0.2 MG/DL (ref 0–1)
BUN BLDV-MCNC: 41 MG/DL (ref 7–20)
BURR CELLS: ABNORMAL
CALCIUM SERPL-MCNC: 8.4 MG/DL (ref 8.3–10.6)
CHLORIDE BLD-SCNC: 111 MMOL/L (ref 99–110)
CO2: 21 MMOL/L (ref 21–32)
CREAT SERPL-MCNC: 1.8 MG/DL (ref 0.6–1.2)
EOSINOPHILS ABSOLUTE: 0.4 K/UL (ref 0–0.6)
EOSINOPHILS RELATIVE PERCENT: 4.4 %
GFR AFRICAN AMERICAN: 33
GFR NON-AFRICAN AMERICAN: 27
GLOBULIN: 2.9 G/DL
GLUCOSE BLD-MCNC: 76 MG/DL (ref 70–99)
GLUCOSE BLD-MCNC: 90 MG/DL (ref 70–99)
HCT VFR BLD CALC: 45.6 % (ref 36–48)
HEMOGLOBIN: 14 G/DL (ref 12–16)
HYPOCHROMIA: ABNORMAL
INR BLD: 1.22 (ref 0.86–1.14)
LYMPHOCYTES ABSOLUTE: 1.6 K/UL (ref 1–5.1)
LYMPHOCYTES RELATIVE PERCENT: 16.9 %
MCH RBC QN AUTO: 24.3 PG (ref 26–34)
MCHC RBC AUTO-ENTMCNC: 30.8 G/DL (ref 31–36)
MCV RBC AUTO: 79.4 FL (ref 80–100)
MICROCYTES: ABNORMAL
MONOCYTES ABSOLUTE: 0.6 K/UL (ref 0–1.3)
MONOCYTES RELATIVE PERCENT: 6.8 %
NEUTROPHILS ABSOLUTE: 6.8 K/UL (ref 1.7–7.7)
NEUTROPHILS RELATIVE PERCENT: 70.8 %
OVALOCYTES: ABNORMAL
PDW BLD-RTO: 20 % (ref 12.4–15.4)
PERFORMED ON: NORMAL
PLATELET # BLD: 343 K/UL (ref 135–450)
PLATELET SLIDE REVIEW: ADEQUATE
PMV BLD AUTO: 9.2 FL (ref 5–10.5)
POTASSIUM REFLEX MAGNESIUM: 5.1 MMOL/L (ref 3.5–5.1)
PROTHROMBIN TIME: 14.2 SEC (ref 10–13.2)
RBC # BLD: 5.77 M/UL (ref 4–5.2)
SCHISTOCYTES: ABNORMAL
SLIDE REVIEW: ABNORMAL
SMUDGE CELLS: PRESENT
SODIUM BLD-SCNC: 143 MMOL/L (ref 136–145)
TOTAL PROTEIN: 6.1 G/DL (ref 6.4–8.2)
TROPONIN: 0.01 NG/ML
WBC # BLD: 9.5 K/UL (ref 4–11)

## 2020-02-06 PROCEDURE — 2580000003 HC RX 258: Performed by: INTERNAL MEDICINE

## 2020-02-06 PROCEDURE — 6370000000 HC RX 637 (ALT 250 FOR IP): Performed by: INTERNAL MEDICINE

## 2020-02-06 PROCEDURE — G0378 HOSPITAL OBSERVATION PER HR: HCPCS

## 2020-02-06 PROCEDURE — 83036 HEMOGLOBIN GLYCOSYLATED A1C: CPT

## 2020-02-06 PROCEDURE — 80053 COMPREHEN METABOLIC PANEL: CPT

## 2020-02-06 PROCEDURE — 36415 COLL VENOUS BLD VENIPUNCTURE: CPT

## 2020-02-06 PROCEDURE — 93005 ELECTROCARDIOGRAM TRACING: CPT | Performed by: EMERGENCY MEDICINE

## 2020-02-06 PROCEDURE — 99285 EMERGENCY DEPT VISIT HI MDM: CPT

## 2020-02-06 PROCEDURE — 84484 ASSAY OF TROPONIN QUANT: CPT

## 2020-02-06 PROCEDURE — 85025 COMPLETE CBC W/AUTO DIFF WBC: CPT

## 2020-02-06 PROCEDURE — 70450 CT HEAD/BRAIN W/O DYE: CPT

## 2020-02-06 PROCEDURE — 93880 EXTRACRANIAL BILAT STUDY: CPT

## 2020-02-06 PROCEDURE — 85610 PROTHROMBIN TIME: CPT

## 2020-02-06 PROCEDURE — 85730 THROMBOPLASTIN TIME PARTIAL: CPT

## 2020-02-06 PROCEDURE — 94760 N-INVAS EAR/PLS OXIMETRY 1: CPT

## 2020-02-06 PROCEDURE — 71045 X-RAY EXAM CHEST 1 VIEW: CPT

## 2020-02-06 RX ORDER — ACETAMINOPHEN 325 MG/1
650 TABLET ORAL EVERY 4 HOURS PRN
Status: DISCONTINUED | OUTPATIENT
Start: 2020-02-06 | End: 2020-02-07 | Stop reason: HOSPADM

## 2020-02-06 RX ORDER — ALLOPURINOL 300 MG/1
300 TABLET ORAL DAILY
Status: DISCONTINUED | OUTPATIENT
Start: 2020-02-06 | End: 2020-02-06 | Stop reason: SDUPTHER

## 2020-02-06 RX ORDER — HYDROCODONE BITARTRATE AND ACETAMINOPHEN 5; 325 MG/1; MG/1
1 TABLET ORAL 4 TIMES DAILY PRN
Status: DISCONTINUED | OUTPATIENT
Start: 2020-02-06 | End: 2020-02-07 | Stop reason: HOSPADM

## 2020-02-06 RX ORDER — ALLOPURINOL 300 MG/1
300 TABLET ORAL DAILY
Status: DISCONTINUED | OUTPATIENT
Start: 2020-02-06 | End: 2020-02-07 | Stop reason: HOSPADM

## 2020-02-06 RX ORDER — CLOPIDOGREL BISULFATE 75 MG/1
75 TABLET ORAL DAILY
Status: DISCONTINUED | OUTPATIENT
Start: 2020-02-06 | End: 2020-02-07 | Stop reason: HOSPADM

## 2020-02-06 RX ORDER — SODIUM CHLORIDE 0.9 % (FLUSH) 0.9 %
10 SYRINGE (ML) INJECTION PRN
Status: DISCONTINUED | OUTPATIENT
Start: 2020-02-06 | End: 2020-02-07 | Stop reason: HOSPADM

## 2020-02-06 RX ORDER — ONDANSETRON 2 MG/ML
4 INJECTION INTRAMUSCULAR; INTRAVENOUS EVERY 6 HOURS PRN
Status: DISCONTINUED | OUTPATIENT
Start: 2020-02-06 | End: 2020-02-07 | Stop reason: HOSPADM

## 2020-02-06 RX ORDER — METOPROLOL TARTRATE 50 MG/1
100 TABLET, FILM COATED ORAL 2 TIMES DAILY
Status: DISCONTINUED | OUTPATIENT
Start: 2020-02-06 | End: 2020-02-07 | Stop reason: HOSPADM

## 2020-02-06 RX ORDER — FAMOTIDINE 20 MG/1
10 TABLET, FILM COATED ORAL 2 TIMES DAILY PRN
Status: DISCONTINUED | OUTPATIENT
Start: 2020-02-06 | End: 2020-02-07 | Stop reason: HOSPADM

## 2020-02-06 RX ORDER — TIZANIDINE 4 MG/1
4 TABLET ORAL EVERY 6 HOURS PRN
Status: DISCONTINUED | OUTPATIENT
Start: 2020-02-06 | End: 2020-02-07 | Stop reason: HOSPADM

## 2020-02-06 RX ORDER — SODIUM CHLORIDE 0.9 % (FLUSH) 0.9 %
10 SYRINGE (ML) INJECTION EVERY 12 HOURS SCHEDULED
Status: DISCONTINUED | OUTPATIENT
Start: 2020-02-06 | End: 2020-02-07 | Stop reason: HOSPADM

## 2020-02-06 RX ORDER — ALLOPURINOL 300 MG/1
300 TABLET ORAL DAILY
COMMUNITY
End: 2020-02-19 | Stop reason: SDUPTHER

## 2020-02-06 RX ORDER — LEVOTHYROXINE SODIUM 112 UG/1
112 TABLET ORAL
Status: DISCONTINUED | OUTPATIENT
Start: 2020-02-07 | End: 2020-02-07 | Stop reason: HOSPADM

## 2020-02-06 RX ORDER — ERGOCALCIFEROL 1.25 MG/1
50000 CAPSULE ORAL WEEKLY
Status: DISCONTINUED | OUTPATIENT
Start: 2020-02-07 | End: 2020-02-07 | Stop reason: HOSPADM

## 2020-02-06 RX ORDER — MECLIZINE HCL 12.5 MG/1
12.5 TABLET ORAL 3 TIMES DAILY PRN
Status: DISCONTINUED | OUTPATIENT
Start: 2020-02-06 | End: 2020-02-07 | Stop reason: HOSPADM

## 2020-02-06 RX ORDER — TOPIRAMATE 25 MG/1
100 TABLET ORAL 2 TIMES DAILY
Status: DISCONTINUED | OUTPATIENT
Start: 2020-02-06 | End: 2020-02-07 | Stop reason: HOSPADM

## 2020-02-06 RX ADMIN — RIVAROXABAN 15 MG: 15 TABLET, FILM COATED ORAL at 18:39

## 2020-02-06 RX ADMIN — ALLOPURINOL 300 MG: 300 TABLET ORAL at 18:39

## 2020-02-06 RX ADMIN — Medication 10 ML: at 20:27

## 2020-02-06 RX ADMIN — METOPROLOL TARTRATE 100 MG: 50 TABLET, FILM COATED ORAL at 20:23

## 2020-02-06 RX ADMIN — TOPIRAMATE 100 MG: 25 TABLET, FILM COATED ORAL at 20:24

## 2020-02-06 RX ADMIN — HYDROCODONE BITARTRATE AND ACETAMINOPHEN 1 TABLET: 5; 325 TABLET ORAL at 20:24

## 2020-02-06 RX ADMIN — CLOPIDOGREL 75 MG: 75 TABLET, FILM COATED ORAL at 18:39

## 2020-02-06 ASSESSMENT — PAIN SCALES - GENERAL
PAINLEVEL_OUTOF10: 5
PAINLEVEL_OUTOF10: 0
PAINLEVEL_OUTOF10: 0

## 2020-02-06 NOTE — H&P
I will refill his medication. He should come in and  prescription. He needs to be evaluated in office for change in medication. Date    BACK SURGERY      CARDIAC CATHETERIZATION  7/2012    CARDIAC CATHETERIZATION  08/07/2018    Unsuccesful  of RCA    CARDIAC SURGERY      CABG & Cardiac ablation    CHOLECYSTECTOMY      CORONARY ARTERY BYPASS GRAFT  1987    LIMA- Diag/LAD, SVG- RCA    FEMORAL BYPASS Left 8/22/2019    LEFT FEMORAL TO POPLITEAL BYPASS GRAFT performed by Sumanth Gracia MD at [facility] [address] FEMORAL-FEMORAL BYPASS GRAFT N/A 8/22/2019    FEMORAL TO FEMORAL BYPASS performed by Sumanth Gracia MD at [address] [facility] [address] Left 03/16/2017    Left hip pinning    JOINT REPLACEMENT      SC INJECT ANES/STEROID FORAMEN LUMBAR/SACRAL W IMG GUIDE ,1 LEVEL Right 12/3/2018    RIGHT L3 AND L4 LUMBAR TRANSFORAMINAL EPIRUAL STEROID INJECTION WITH FLUOROSCOPY performed by Trisha Qureshi MD at [address] [facility]  11/04/2014    MARLENY - 3.0 x 28 to the Ist Diag   Yyordanire      left       Medications Prior to Admission:      Prior to Admission medications    Medication Sig Start Date End Date Taking? Authorizing Provider   allopurinol (ZYLOPRIM) 300 MG tablet Take 300 mg by mouth daily   Yes Historical Provider, MD   famotidine (PEPCID) 20 MG tablet Take 1 tablet by mouth 2 times daily as needed (nausea) 2/4/20  Yes Jersey Pabon MD   torsemide (DEMADEX) 10 MG tablet Take 3 tablets by mouth daily 1/16/20  Yes TIFFANIE Louis - BRIA   HYDROcodone-acetaminophen (NORCO) 5-325 MG per tablet Take 1 tablet by mouth 4 times daily as needed for Pain for up to 30 days.  1/10/20 2/9/20 Yes Jersey Pabon MD   topiramate (TOPAMAX) 100 MG tablet Take 1 tablet by mouth 2 times daily 12/30/19  Yes Jersey Pabon MD   spironolactone (ALDACTONE) 25 MG tablet Take 0.5 tablets by mouth daily 12/30/19  Yes Jersey Pabon MD   tiZANidine (ZANAFLEX) 4 MG tablet Take 4 mg by mouth every 6 hours as needed   Yes Historical Provider, MD   vitamin D (ERGOCALCIFEROL) 1.25 MG (27413 UT) CAPS capsule Take 50,000 Units by mouth once a week   Yes Historical Provider, MD   rivaroxaban (XARELTO) 15 MG TABS tablet Take 15 mg by mouth daily    Yes Historical Provider, MD   metoprolol (LOPRESSOR) 100 MG tablet Take 1 tablet by mouth 2 times daily 12/2/19  Yes Gayathri De León MD   levothyroxine (SYNTHROID) 112 MCG tablet TAKE 1 TABLET DAILY 9/11/19  Yes Gayathri De León MD   allopurinol (ZYLOPRIM) 100 MG tablet Take 1 tablet by mouth daily Take along with 300mg tablet for total of 400mg. 9/11/19  Yes Gayathri De León MD   clopidogrel (PLAVIX) 75 MG tablet Take 1 tablet by mouth daily 9/11/19  Yes Gayathri De León MD   meclizine (ANTIVERT) 12.5 MG tablet Take 1 tablet by mouth 3 times daily as needed for Dizziness or Nausea 7/23/19  Yes Gayathri De León MD       Allergies:  Aspirin; Ativan [lorazepam]; Diltiazem; Sulfa antibiotics; Dabigatran; Lipitor [atorvastatin]; Mysoline [primidone]; Nsaids; and Other    Social History:      The patient currently lives at home     TOBACCO:   reports that she quit smoking about 33 years ago. Her smoking use included cigarettes. She has a 28.00 pack-year smoking history. She has never used smokeless tobacco.  ETOH:   reports previous alcohol use. E-Cigarettes Vaping or Juuling     Questions Responses    E-Cigarette Use Never User    Start Date     Does device contain nicotine? Never    Quit Date     E-Cigarette Type Unknown            Family History:       Reviewed in detail and negative for DM, CAD, Cancer, CVA. Positive as follows:        Problem Relation Age of Onset    Cancer Sister     Heart Disease Sister     Diabetes Brother     Hypertension Brother     Heart Disease Brother     Stroke Maternal Aunt     Heart Disease Maternal Aunt     Diabetes Maternal Uncle     Hypertension Paternal Aunt     Cancer Maternal Grandmother     Cancer Paternal Grandmother     Rheum Arthritis Neg Hx     Lupus Neg Hx        REVIEW OF SYSTEMS:   Pertinent positives as noted in the HPI.  All other systems reviewed and negative. PHYSICAL EXAM PERFORMED:    BP (!) 146/62   Pulse 82   Temp 98 °F (36.7 °C) (Infrared)   Resp 17   Ht 5' 3\" (1.6 m)   Wt 133 lb 8 oz (60.6 kg)   SpO2 100%   BMI 23.65 kg/m²     General appearance:  No apparent distress, appears stated age and cooperative. HEENT:  Normal cephalic, atraumatic without obvious deformity. Pupils equal, round, and reactive to light. Extra ocular muscles intact. Conjunctivae/corneas clear. Neck: Supple, with full range of motion. No jugular venous distention. Trachea midline. Respiratory:  Normal respiratory effort. Clear to auscultation, bilaterally without Rales/Wheezes/Rhonchi. Cardiovascular:  Regular rate and rhythm with normal S1/S2 without murmurs, rubs or gallops. Abdomen: Soft, non-tender, non-distended with normal bowel sounds. Musculoskeletal:  No clubbing, cyanosis or edema bilaterally. Full range of motion without deformity. Skin: Skin color, texture, turgor normal.  No rashes or lesions. Neurologic:  Neurovascularly intact without any focal sensory/motor deficits.  Cranial nerves: II-XII intact, grossly non-focal.  Psychiatric:  Alert and oriented, thought content appropriate, normal insight  Capillary Refill: Brisk,< 3 seconds   Peripheral Pulses: +2 palpable, equal bilaterally       Labs:     Recent Labs     02/06/20  1023   WBC 9.5   HGB 14.0   HCT 45.6        Recent Labs     02/06/20  1023      K 5.1   *   CO2 21   BUN 41*   CREATININE 1.8*   CALCIUM 8.4     Recent Labs     02/06/20  1023   AST 16   ALT 12   BILITOT <0.2   ALKPHOS 101     Recent Labs     02/06/20  1023   INR 1.22*     Recent Labs     02/06/20  1023   TROPONINI 0.01       Urinalysis:      Lab Results   Component Value Date    NITRU Negative 11/16/2019    WBCUA 9 11/16/2019    BACTERIA 3+ 08/15/2019    RBCUA 2 11/16/2019    BLOODU Negative 11/16/2019    SPECGRAV 1.020 11/16/2019    GLUCOSEU Negative 11/16/2019    GLUCOSEU NEGATIVE 12/04/2011

## 2020-02-06 NOTE — ED NOTES
Pharmacy Medication History Note      List of current medications patient is taking is complete. Source of information: patient    Changes made to medication list:  Medications flagged for removal (include reason, ex. noncompliance):  N/A    Medications removed (include reason, ex. therapy complete or physician discontinued):  N/A    Medications added/doses adjusted:  Allopurinol 300mg- QD    Other notes (ex. Recent course of antibiotics, Coumadin dosing):  Denies use of other OTC or herbal medications. Last dose times updated. Rory Campo [facility]    No current facility-administered medications on file prior to encounter. Current Outpatient Medications on File Prior to Encounter   Medication Sig Dispense Refill    allopurinol (ZYLOPRIM) 300 MG tablet Take 300 mg by mouth daily      famotidine (PEPCID) 20 MG tablet Take 1 tablet by mouth 2 times daily as needed (nausea) 30 tablet 0    torsemide (DEMADEX) 10 MG tablet Take 3 tablets by mouth daily 60 tablet 1    HYDROcodone-acetaminophen (NORCO) 5-325 MG per tablet Take 1 tablet by mouth 4 times daily as needed for Pain for up to 30 days. 120 tablet 0    topiramate (TOPAMAX) 100 MG tablet Take 1 tablet by mouth 2 times daily 180 tablet 0    spironolactone (ALDACTONE) 25 MG tablet Take 0.5 tablets by mouth daily 45 tablet 0    tiZANidine (ZANAFLEX) 4 MG tablet Take 4 mg by mouth every 6 hours as needed      vitamin D (ERGOCALCIFEROL) 1.25 MG (50956 UT) CAPS capsule Take 50,000 Units by mouth once a week      rivaroxaban (XARELTO) 15 MG TABS tablet Take 15 mg by mouth daily       metoprolol (LOPRESSOR) 100 MG tablet Take 1 tablet by mouth 2 times daily 180 tablet 0    levothyroxine (SYNTHROID) 112 MCG tablet TAKE 1 TABLET DAILY 90 tablet 1    allopurinol (ZYLOPRIM) 100 MG tablet Take 1 tablet by mouth daily Take along with 300mg tablet for total of 400mg.  90 tablet 1    clopidogrel (PLAVIX) 75 MG tablet Take 1 tablet by mouth daily 90 tablet 1    meclizine

## 2020-02-06 NOTE — ED NOTES
Bed: 05  Expected date:   Expected time:   Means of arrival:   Comments:  [address] [place], RN  02/06/20 [id number]

## 2020-02-06 NOTE — ED PROVIDER NOTES
[facility] Emergency Department      Pt Name: Raina Ochoa  MRN: [id number]  [place] 1940  Date of evaluation: 2/6/2020  Provider: Kayla Soares MD  [address] [place] [address]  Chief Complaint   Patient presents with    Dizziness     sudden onset of room spinning dizziness. \"I just remembered I have vertigo and I have pills for it at home but I forgot until I just got here. \"      HPI  Raina Ochoa is a 78 y.o. female who presents because of feeling dizzy. She says she noticed symptoms about 830. She indicated in triage that it was like the room was spinning. She tells me that she just really cannot describe it very well. She has had vertigo before but this feels different. It is not exceptionally worse if she moves her head from side to side. She does feel like her balance is off when she tries to walk. She denies any chest or abdominal pain. She denies any headache. She does have a numb feeling generally in her head. She denies peripheral numbness, tingling, or weakness that is new. She has chronic neuropathy of both of her legs. She has known peripheral vascular disease and chronic kidney disease. She has a history of atrial fibrillation and is on Xarelto and plavix. REVIEW OF SYSTEMS:  No fever, no hearing loss, no visual change, no chest pain, no abdominal pain Pertinent positives and negatives as per the HPI. All other review of systems reviewed and negative. Nursing notes reviewed.     PAST MEDICAL HISTORY  Past Medical History:   Diagnosis Date    Allergic rhinitis, cause unspecified     Arthritis     Atrial fibrillation ([address]     Bronchopneumonia     CAD (coronary artery disease)     stent:  post cataract surgery (CABG)    Cerebral artery occlusion with cerebral infarction ([address]     TIA    Chronic gouty arthropathy     Chronic kidney disease     Congestive heart failure, unspecified     Degeneration of cervical intervertebral disc     Essential and other specified forms of tremor     Gout     HIGH CHOLESTEROL     History of CVA (cerebrovascular accident)     Hx of blood clots     Hypertension     Influenza 12/23/2017    Mitral valve stenosis and aortic valve insufficiency     Movement disorder     back problems    Pacemaker     Peptic ulcer, unspecified site, unspecified as acute or chronic, without mention of hemorrhage, perforation, or obstruction     Thyroid disease     Unspecified disorder of kidney and ureter     Unspecified hypothyroidism      SURGICAL HISTORY  Past Surgical History:   Procedure Laterality Date    BACK SURGERY      CARDIAC CATHETERIZATION  7/2012    CARDIAC CATHETERIZATION  08/07/2018    Unsuccesful  of RCA    CARDIAC SURGERY      CABG & Cardiac ablation    CHOLECYSTECTOMY      CORONARY ARTERY BYPASS GRAFT  1987    LIMA- Diag/LAD, SVG- RCA    FEMORAL BYPASS Left 8/22/2019    LEFT FEMORAL TO POPLITEAL BYPASS GRAFT performed by Jodie Ibanez MD at [address] [address] FEMORAL-FEMORAL BYPASS GRAFT N/A 8/22/2019    FEMORAL TO FEMORAL BYPASS performed by Jodie Ibanez MD at [address] [place] [address] Left 03/16/2017    Left hip pinning    JOINT REPLACEMENT      [place] INJECT ANES/STEROID FORAMEN LUMBAR/SACRAL W IMG GUIDE ,1 LEVEL Right 12/3/2018    RIGHT L3 AND L4 LUMBAR TRANSFORAMINAL EPIRUAL STEROID INJECTION WITH FLUOROSCOPY performed by Vinnie Garza MD at [facility]  11/04/2014    MARLENY - 3.0 x 28 to the Ist Diag    SHOULDER SURGERY      left     MEDICATIONS:  No current facility-administered medications on file prior to encounter.       Current Outpatient Medications on File Prior to Encounter   Medication Sig Dispense Refill    allopurinol (ZYLOPRIM) 300 MG tablet Take 300 mg by mouth daily      famotidine (PEPCID) 20 MG tablet Take 1 tablet by mouth 2 times daily as needed (nausea) 30 tablet 0    torsemide (DEMADEX) 10 MG tablet Take 3 tablets by mouth daily 60 tablet 1    HYDROcodone-acetaminophen (NORCO) 5-325 MG per tablet Take 1 tablet by mouth 4 times daily as needed for Pain for up to 30 days. 120 tablet 0    topiramate (TOPAMAX) 100 MG tablet Take 1 tablet by mouth 2 times daily 180 tablet 0    spironolactone (ALDACTONE) 25 MG tablet Take 0.5 tablets by mouth daily 45 tablet 0    tiZANidine (ZANAFLEX) 4 MG tablet Take 4 mg by mouth every 6 hours as needed      vitamin D (ERGOCALCIFEROL) 1.25 MG (05107 UT) CAPS capsule Take 50,000 Units by mouth once a week      rivaroxaban (XARELTO) 15 MG TABS tablet Take 15 mg by mouth daily       metoprolol (LOPRESSOR) 100 MG tablet Take 1 tablet by mouth 2 times daily 180 tablet 0    levothyroxine (SYNTHROID) 112 MCG tablet TAKE 1 TABLET DAILY 90 tablet 1    allopurinol (ZYLOPRIM) 100 MG tablet Take 1 tablet by mouth daily Take along with 300mg tablet for total of 400mg. 90 tablet 1    clopidogrel (PLAVIX) 75 MG tablet Take 1 tablet by mouth daily 90 tablet 1    meclizine (ANTIVERT) 12.5 MG tablet Take 1 tablet by mouth 3 times daily as needed for Dizziness or Nausea 90 tablet 0     ALLERGIES  Aspirin; Ativan [lorazepam]; Diltiazem; Sulfa antibiotics; Dabigatran; Lipitor [atorvastatin]; Mysoline [primidone];  Nsaids; and Other  FAMILY HISTORY:  Family History   Problem Relation Age of Onset    Cancer Sister     Heart Disease Sister     Diabetes Brother     Hypertension Brother     Heart Disease Brother     Stroke Maternal Aunt     Heart Disease Maternal Aunt     Diabetes Maternal Uncle     Hypertension Paternal Aunt     Cancer Maternal Grandmother     Cancer Paternal Grandmother     Rheum Arthritis Neg Hx     Lupus Neg Hx      SOCIAL HISTORY:  Social History     Tobacco Use    Smoking status: Former Smoker     Packs/day: 1.00     Years: 28.00     Pack years: 28.00     Types: Cigarettes     Last attempt to quit: 1987     Years since quittin.1    Smokeless tobacco: Never Used    Tobacco comment: H.O.smoking at age 15 / smoked up to 1 p.p.d / quit  Substance Use Topics    Alcohol use: Not Currently     Alcohol/week: 0.0 standard drinks    Drug use: No     IMMUNIZATIONS:  Noncontributory    PHYSICAL EXAM  VITAL SIGNS:  /62   Pulse 83   Temp 98 °F (36.7 °C) (Infrared)   Resp 16   Ht 5' 3\" (1.6 m)   Wt 133 lb 8 oz (60.6 kg)   SpO2 100%   BMI 23.65 kg/m²   Constitutional:  78 y.o. female alert, seated  HENT:  Atraumatic, mucous membranes moist  Eyes:   Conjunctiva clear, no discharge, no icterus  Neck:  Supple, no adenopathy, no visible JVD, no meningismus  Cardiovascular:  Irregular, no rubs  Thorax & Lungs:  No accessory muscle usage, clear  Abdomen:  Soft, non distended, no pulsatility, bowel sounds present, no tenderness  Back:  No deformity  Genitalia:  Deferred  Rectal:  Deferred  Extremities:  No cyanosis, no edema  Skin:  Warm, dry  Neurologic:  Alert & oriented, GCS 15, no slurred speech, CN function intact, PERRL, no nystagmus, sensation grossly intact to light touch, coordination of extremities is fair without deficit, gait is slow with a limp but she can ambulate, she did have mild ataxia of both of her arms on initial testing which corrected when I tested her a few minutes later  Psychiatric:  Affect appropriate    NIH Stroke Scale  Level of Consciousness (1a. ): Alert  LOC Questions (1b. ): Answers both correctly  LOC Commands (1c. ): Performs both tasks correctly  Best Gaze (2. ): Normal  Visual (3. ): No visual loss  Facial Palsy (4. ): Normal symmetrical movement  Motor Arm, Left (5a. ): No drift  Motor Arm, Right (5b. ): No drift  Motor Leg, Left (6a. ): No drift  Motor Leg, Right (6b. ): No drift  Limb Ataxia (7. ): Absent  Sensory (8. ): Normal  Best Language (9. ): No aphasia  Dysarthria (10. ): Normal  Extinction and Inattention (11): No abnormality  Total: 0    DIAGNOSTIC RESULTS:  Labs resulted at the time of this note reviewed.   Labs Reviewed   CBC WITH AUTO DIFFERENTIAL - Abnormal; Notable for the following components: Result Value    RBC 5.77 (*)     MCV 79.4 (*)     MCH 24.3 (*)     MCHC 30.8 (*)     RDW 20.0 (*)     [place] Cells Present (*)     Anisocytosis 2+ (*)     Microcytes 1+ (*)     Hypochromia 1+ (*)     Schistocytes Occasional (*)     [place] Cells Occasional (*)     Ovalocytes 2+ (*)     All other components within normal limits    Narrative:     Performed at:  [facility] [address], [facility], [address]   Phone [phone]   COMPREHENSIVE METABOLIC PANEL W/ REFLEX TO MG FOR LOW K - Abnormal; Notable for the following components:    Chloride 111 (*)     BUN 41 (*)     CREATININE 1.8 (*)     GFR Non- 27 (*)     GFR  33 (*)     Total Protein 6.1 (*)     Alb 3.2 (*)     All other components within normal limits    Narrative:     Performed at:  [facility] [address], [facility], [facility] [address]   Phone [phone]   PROTIME-INR - Abnormal; Notable for the following components:    Protime 14.2 (*)     INR 1.22 (*)     All other components within normal limits    Narrative:     Performed at:  [facility] [address], [facility], [address] [place] [address]   Phone [phone]   APTT - Abnormal; Notable for the following components:    aPTT 39.4 (*)     All other components within normal limits    Narrative:     Performed at:  [facility] [address], [facility], [facility] [address]   Phone [phone]   TROPONIN    Narrative:     Performed at:  [facility] [address], [facility], [facility] [address]   Phone [phone]   HEMOGLOBIN K7A   BASIC METABOLIC PANEL W/ REFLEX TO MG FOR LOW K   CBC   LIPID PANEL   POCT GLUCOSE    Narrative:     Performed at:  [facility] [address], [facility], [facility] [address]   Phone [phone]     EKG:  Read by me in the absence of a cardiologist shows: Demand pacemaker, probable underlying A. fib, PVCs, poor R wave progression, nonspecific ST-T wave abnormality, Axis XX 1 degrees, more ectopy than prior EKG    RADIOLOGY:    Plain x-rays were viewed by me:   XR CHEST PORTABLE   Final Result   Mild congestive changes with small left-sided effusion         CT Head WO Contrast   Final Result   No acute intracranial abnormality. Critical results were called by Dr. Tala Hardy MD to [address] [place] [address] on 2/6/2020 at 10:39. VL DUP CAROTID BILATERAL    (Results Pending)     ED COURSE:  Uneventful    PROCEDURES:  None    CRITICAL CARE:  None    CONSULTATIONS:  Dr Starr Ibarra Stroke team, Hospitalist     MEDICAL DECISION MAKING: Aubrey Lizama is a 78 y.o. female who presented because of dizziness. She has a history of PVD, remote hx of TIA. [facility] Stroke Team has been consulted and thrombolytics will NOT be administered due to the risk/benefit profile of the drug and the patient's clinical presentation (current symptoms not disabling, use of Xarelto). CKD levels are stable. I discussed the case in full with the admitting physician. Differential Diagnosis:  Stroke, Ménière's disease, anemia, dehydration, ACS, malignant dysrhythmia, PE, ICH, meningitis, vascular occlusion or dissection, sepsis, electrolyte imbalance, overdose, etc    FINAL IMPRESSION:    1. Dizziness      DISPOSITION Admitted 02/06/2020 12:16:51 PM    (Please note that I used voice recognition software to generate this note.   Occasionally words are mistranscribed despite my efforts to edit errors.)        Elier Mcgregor MD  02/06/20 0413

## 2020-02-07 VITALS
SYSTOLIC BLOOD PRESSURE: 98 MMHG | TEMPERATURE: 97.2 F | HEART RATE: 83 BPM | BODY MASS INDEX: 24.11 KG/M2 | HEIGHT: 63 IN | OXYGEN SATURATION: 94 % | DIASTOLIC BLOOD PRESSURE: 53 MMHG | WEIGHT: 136.1 LBS | RESPIRATION RATE: 16 BRPM

## 2020-02-07 LAB
ANION GAP SERPL CALCULATED.3IONS-SCNC: 9 MMOL/L (ref 3–16)
BUN BLDV-MCNC: 35 MG/DL (ref 7–20)
CALCIUM SERPL-MCNC: 7.9 MG/DL (ref 8.3–10.6)
CHLORIDE BLD-SCNC: 111 MMOL/L (ref 99–110)
CO2: 20 MMOL/L (ref 21–32)
CREAT SERPL-MCNC: 1.7 MG/DL (ref 0.6–1.2)
EKG ATRIAL RATE: 74 BPM
EKG DIAGNOSIS: NORMAL
EKG Q-T INTERVAL: 416 MS
EKG QRS DURATION: 102 MS
EKG QTC CALCULATION (BAZETT): 500 MS
EKG R AXIS: 21 DEGREES
EKG T AXIS: 25 DEGREES
EKG VENTRICULAR RATE: 87 BPM
ESTIMATED AVERAGE GLUCOSE: 128.4 MG/DL
GFR AFRICAN AMERICAN: 35
GFR NON-AFRICAN AMERICAN: 29
GLUCOSE BLD-MCNC: 86 MG/DL (ref 70–99)
HBA1C MFR BLD: 6.1 %
HCT VFR BLD CALC: 42.5 % (ref 36–48)
HEMOGLOBIN: 13.1 G/DL (ref 12–16)
MCH RBC QN AUTO: 24.3 PG (ref 26–34)
MCHC RBC AUTO-ENTMCNC: 30.9 G/DL (ref 31–36)
MCV RBC AUTO: 78.8 FL (ref 80–100)
PDW BLD-RTO: 19.7 % (ref 12.4–15.4)
PLATELET # BLD: 276 K/UL (ref 135–450)
PMV BLD AUTO: 9.4 FL (ref 5–10.5)
POTASSIUM REFLEX MAGNESIUM: 5.6 MMOL/L (ref 3.5–5.1)
RBC # BLD: 5.39 M/UL (ref 4–5.2)
SODIUM BLD-SCNC: 140 MMOL/L (ref 136–145)
WBC # BLD: 7.1 K/UL (ref 4–11)

## 2020-02-07 PROCEDURE — 99219 PR INITIAL OBSERVATION CARE/DAY 50 MINUTES: CPT | Performed by: PSYCHIATRY & NEUROLOGY

## 2020-02-07 PROCEDURE — 80061 LIPID PANEL: CPT

## 2020-02-07 PROCEDURE — 80048 BASIC METABOLIC PNL TOTAL CA: CPT

## 2020-02-07 PROCEDURE — 97116 GAIT TRAINING THERAPY: CPT

## 2020-02-07 PROCEDURE — 6370000000 HC RX 637 (ALT 250 FOR IP): Performed by: INTERNAL MEDICINE

## 2020-02-07 PROCEDURE — 36415 COLL VENOUS BLD VENIPUNCTURE: CPT

## 2020-02-07 PROCEDURE — G0378 HOSPITAL OBSERVATION PER HR: HCPCS

## 2020-02-07 PROCEDURE — 85027 COMPLETE CBC AUTOMATED: CPT

## 2020-02-07 PROCEDURE — 97165 OT EVAL LOW COMPLEX 30 MIN: CPT

## 2020-02-07 PROCEDURE — 97530 THERAPEUTIC ACTIVITIES: CPT

## 2020-02-07 PROCEDURE — 93010 ELECTROCARDIOGRAM REPORT: CPT | Performed by: INTERNAL MEDICINE

## 2020-02-07 PROCEDURE — 2580000003 HC RX 258: Performed by: INTERNAL MEDICINE

## 2020-02-07 PROCEDURE — 97161 PT EVAL LOW COMPLEX 20 MIN: CPT

## 2020-02-07 RX ORDER — SODIUM POLYSTYRENE SULFONATE 15 G/60ML
15 SUSPENSION ORAL; RECTAL ONCE
Status: DISCONTINUED | OUTPATIENT
Start: 2020-02-07 | End: 2020-02-07 | Stop reason: HOSPADM

## 2020-02-07 RX ADMIN — LEVOTHYROXINE SODIUM 112 MCG: 112 TABLET ORAL at 06:04

## 2020-02-07 RX ADMIN — ERGOCALCIFEROL 50000 UNITS: 1.25 CAPSULE ORAL at 09:30

## 2020-02-07 RX ADMIN — TOPIRAMATE 100 MG: 25 TABLET, FILM COATED ORAL at 09:21

## 2020-02-07 RX ADMIN — RIVAROXABAN 15 MG: 15 TABLET, FILM COATED ORAL at 09:21

## 2020-02-07 RX ADMIN — CLOPIDOGREL 75 MG: 75 TABLET, FILM COATED ORAL at 09:21

## 2020-02-07 RX ADMIN — METOPROLOL TARTRATE 100 MG: 50 TABLET, FILM COATED ORAL at 09:22

## 2020-02-07 RX ADMIN — ALLOPURINOL 300 MG: 300 TABLET ORAL at 09:21

## 2020-02-07 RX ADMIN — Medication 10 ML: at 09:22

## 2020-02-07 ASSESSMENT — PAIN SCALES - GENERAL
PAINLEVEL_OUTOF10: 0
PAINLEVEL_OUTOF10: 2

## 2020-02-07 ASSESSMENT — PAIN DESCRIPTION - ORIENTATION: ORIENTATION: LEFT

## 2020-02-07 ASSESSMENT — PAIN DESCRIPTION - PAIN TYPE: TYPE: CHRONIC PAIN;SURGICAL PAIN

## 2020-02-07 ASSESSMENT — PAIN DESCRIPTION - LOCATION: LOCATION: LEG

## 2020-02-07 NOTE — CONSULTS
In patient Neurology consult        [facility] Neurology      Nurys Reyes, [address]  1940    Date of Service: 2/7/2020    Referring Physician: Dr. Marsha Mathews MD      Reason for the consult and CC: Acute dizziness and ataxia    HPI:   The patient is a 78y.o.  years old female with history of A. fib, TIA and chronic kidney disease who was admitted to the hospital yesterday with acute dizziness. Symptoms started yesterday morning. She woke up with severe dizziness with spinning sensation and unsteady. Degree was severe and persistent with waxing and waning features. Triggers including sudden head or body movement. Other associated symptoms include \"feeling funny feeling in my head\". The patient said that her current symptoms are somewhat different than her chronic dizziness. No falling or injury or blackout or chest pain. No LOC. She came to the hospital where she was admitted. Initial CT of the head showed no acute findings. Today she feels somewhat better. Back to her baseline. She has a pacemaker incompatible with MRI. Blood test today showed her creatinine of 1.7 and potassium of 5.6. She denies any change in medication. No more dizziness or severe headaches. Other review of system was unremarkable.       Family History   Problem Relation Age of Onset    Cancer Sister     Heart Disease Sister     Diabetes Brother     Hypertension Brother     Heart Disease Brother     Stroke Maternal Aunt     Heart Disease Maternal Aunt     Diabetes Maternal Uncle     Hypertension Paternal Aunt     Cancer Maternal Grandmother     Cancer Paternal Grandmother     Rheum Arthritis Neg Hx     Lupus Neg Hx      Past Surgical History:   Procedure Laterality Date    BACK SURGERY      CARDIAC CATHETERIZATION  7/2012    CARDIAC CATHETERIZATION  08/07/2018    Unsuccesful  of RCA    CARDIAC SURGERY      CABG & Cardiac ablation    CHOLECYSTECTOMY      CORONARY ARTERY BYPASS GRAFT     LIMA- Diag/LAD, SVG- RCA    FEMORAL BYPASS Left 2019    LEFT FEMORAL TO POPLITEAL BYPASS GRAFT performed by Aliyah Floyd MD at [facility] FEMORAL-FEMORAL BYPASS GRAFT N/A 2019    FEMORAL TO FEMORAL BYPASS performed by Aliyah Floyd MD at [address] Left 2017    Left hip pinning    JOINT REPLACEMENT      [place] INJECT ANES/STEROID FORAMEN LUMBAR/SACRAL W IMG GUIDE ,1 LEVEL Right 12/3/2018    RIGHT L3 AND L4 LUMBAR TRANSFORAMINAL EPIRUAL STEROID INJECTION WITH FLUOROSCOPY performed by Landon Ta MD at  [facility]  2014    MARLENY - 3.0 x 28 to the Ist [facility]      left        Past Medical History:   Diagnosis Date    Allergic rhinitis, cause unspecified     Arthritis     Atrial fibrillation ([address]     Bronchopneumonia     CAD (coronary artery disease)     stent:  post cataract surgery (CABG)    Cerebral artery occlusion with cerebral infarction ([address]     TIA    Chronic gouty arthropathy     Chronic kidney disease     Congestive heart failure, unspecified     Degeneration of cervical intervertebral disc     Essential and other specified forms of tremor     Gout     HIGH CHOLESTEROL     History of CVA (cerebrovascular accident)     Hx of blood clots     Hypertension     Influenza 2017    Mitral valve stenosis and aortic valve insufficiency     Movement disorder     back problems    Pacemaker     Peptic ulcer, unspecified site, unspecified as acute or chronic, without mention of hemorrhage, perforation, or obstruction     Thyroid disease     Unspecified disorder of kidney and ureter     Unspecified hypothyroidism      Social History     Tobacco Use    Smoking status: Former Smoker     Packs/day: 1.00     Years: 28.00     Pack years: 28.00     Types: Cigarettes     Last attempt to quit: 1987     Years since quittin.1    Smokeless tobacco: Never Used    Tobacco comment: H.O.smoking at age 15 / smoked up modalities in both arms and legs. Gait/Posture: steady gait and normal posturing and station. Data:  LABS:   Lab Results   Component Value Date     02/07/2020    K 5.6 02/07/2020     02/07/2020    CO2 20 02/07/2020    BUN 35 02/07/2020    CREATININE 1.7 02/07/2020    GFRAA 35 02/07/2020    GFRAA 38 04/02/2013    LABGLOM 29 02/07/2020    GLUCOSE 86 02/07/2020    GLUCOSE 104 01/31/2020    PHOS 4.3 01/31/2020    MG 2.50 11/19/2019    CALCIUM 7.9 02/07/2020     Lab Results   Component Value Date    WBC 7.1 02/07/2020    RBC 5.39 02/07/2020    HGB 13.1 02/07/2020    HCT 42.5 02/07/2020    MCV 78.8 02/07/2020    RDW 19.7 02/07/2020     02/07/2020     Lab Results   Component Value Date    INR 1.22 (H) 02/06/2020    PROTIME 14.2 (H) 02/06/2020       Neuroimaging were independently reviewed by myself and discussed results with the patient  Reviewed notes from different physicians  Reviewed lab and blood testing    Impression:  Acute on chronic dizziness and vertigo. Could be acute vestibular neuronitis or peripheral vertigo. Less likely ischemic stroke or vertebrobasilar insufficiency. Chronic kidney disease  Hypertension  A. fib  Chronic anticoagulation  Hyperlipidemia  CAD    Recommendation:  Continue current supportive measures  PT and OT  Continue Xarelto  Statin  Continue home blood pressure medications  Monitor blood pressure at home  Vestibular training outpatient if symptoms recur  Discussed risk of bleeding with dual therapy with Xarelto and Plavix  Follow-up with her PCP regarding her Topamax as she is currently on 200 mg daily and the risk of worsening kidney function testing  Hydration  Can be discharged when medically stable  No further recommendation. Discussed with primary team.      Thank you for referring such patient. If you have any questions regarding my consult note, please don't hesitate to call me.      Sunshine Rock MD  [phone]    This dictation was generated by voice

## 2020-02-07 NOTE — PROGRESS NOTES
Occupational Therapy   Occupational Therapy Initial Assessment & Discharge  Date: 2020   Patient Name: Mary Cleveland  MRN: [id number]     : 1940    Date of Service: 2020    Discharge Recommendations: Mary Cleveland scored a 24/24 on the [facility] ADL Inpatient form. At this time, no further OT is recommended upon discharge due to being at baseline level. Recommend patient returns to prior setting with prior services. OT Equipment Recommendations  Equipment Needed: No  Other: Pt reports having all needed equipment at home     Assessment   Performance deficits / Impairments: Decreased functional mobility ; Decreased ADL status  Assessment: 78 y.o. female evaluated by occupational therapy for the above deficits. Pt is currently functioning at baseline level. No further OT needs identified at this liz   Treatment Diagnosis: Decreased functional mobility and ADL status   Prognosis: Good  Decision Making: Low Complexity  OT Education: OT Role;Plan of Care  REQUIRES OT FOLLOW UP: No  Activity Tolerance  Activity Tolerance: Patient Tolerated treatment well  Safety Devices  Safety Devices in place: Yes  Type of devices: Left in chair;Call light within reach;Nurse notified           Patient Diagnosis(es): The encounter diagnosis was Dizziness.      has a past medical history of Allergic rhinitis, cause unspecified, Arthritis, Atrial fibrillation ([address] Bronchopneumonia, CAD (coronary artery disease), Cerebral artery occlusion with cerebral infarction ([address] Chronic gouty arthropathy, Chronic kidney disease, Congestive heart failure, unspecified, Degeneration of cervical intervertebral disc, Essential and other specified forms of tremor, Gout, HIGH CHOLESTEROL, History of CVA (cerebrovascular accident), Hx of blood clots, Hypertension, Influenza, Mitral valve stenosis and aortic valve insufficiency, Movement disorder, Pacemaker, Peptic ulcer, unspecified site, unspecified as acute or chronic, without mention of hemorrhage, perforation, or obstruction, Thyroid disease, Unspecified disorder of kidney and ureter, and Unspecified hypothyroidism. has a past surgical history that includes back surgery; Cholecystectomy; Cardiac surgery; Coronary artery bypass graft (1987); shoulder surgery; Cardiac catheterization (7/2012); hip surgery (Left, 03/16/2017); joint replacement; Cardiac catheterization (08/07/2018); Percutaneous Transluminal Coronary Angio (11/04/2014); pr inject anes/steroid foramen lumbar/sacral w img guide ,1 level (Right, 12/3/2018); Femoral-femoral Bypass Graft (N/A, 8/22/2019); and femoral bypass (Left, 8/22/2019). Treatment Diagnosis: Decreased functional mobility and ADL status       Restrictions  Restrictions/Precautions  Restrictions/Precautions: Up as Tolerated  Position Activity Restriction  Other position/activity restrictions: 78 y.o. female who presents because of feeling dizzy. She says she noticed symptoms about 830. She indicated in triage that it was like the room was spinning. She tells me that she just really cannot describe it very well. She has had vertigo before but this feels different. It is not exceptionally worse if she moves her head from side to side. She does feel like her balance is off when she tries to walk.       Subjective   General  Chart Reviewed: Yes  Patient assessed for rehabilitation services?: Yes  Family / Caregiver Present: No  Diagnosis: Dizziness  Subjective  Subjective: Pt seated on toilet w/ nursing present, pleasant and agreeable to OT eval   Patient Currently in Pain: Denies  Vital Signs  Patient Currently in Pain: Denies  Social/Functional History  Social/Functional History  Lives With: Spouse  Type of Home: House  Home Layout: One level  Home Access: Level entry  Bathroom Shower/Tub: Walk-in shower  Bathroom Toilet: Standard  Bathroom Equipment: Toilet raiser, Grab bars in shower, Built-in shower seat, Grab bars around toilet  Bathroom

## 2020-02-07 NOTE — PROGRESS NOTES
Physical Therapy    Facility/Department: [address]  Initial Assessment/Discharge Summary    NAME: David Gutierrez  : 1940  MRN: [id number]    Date of Service: 2020    Discharge Recommendations: David Gutierrez scored a 24/24 on the AM-PAC short mobility form. At this time, no further PT is recommended as patient is at functional baseline. Recommend patient returns to prior setting with prior services. PT Equipment Recommendations  Equipment Needed: No  Other: Patient has walker at home. Assessment   Body structures, Functions, Activity limitations: Decreased balance;Decreased endurance  Assessment: Patient demonstrates balance/gait impairment related to LLE pain which increases with activity and resembles claudication. Patient has walker/cane at home she uses prn. She denies need for therapy at d/c as she does not feel it will help her pain. Encouraged patient to f/u w/ surgeon, possibly vascular referral.   Patient is at functional baseline and safe for d/c home w/ use or assistive device prn. Treatment Diagnosis: LLE pain, possible intermittent claudication given CAD history and current symptoms  Prognosis: Fair  Decision Making: Low Complexity  History: See above. Exam: MMT, ROM, functional mobility assessment  Clinical Presentation: Stable. Improved. Baseline. PT Education: PT Role;Plan of Care;Goals; Disease Specific Education  Patient Education: Patient encouraged to f/u w/ surgeon, possibly vascular MD, handouts from [facility] given on claudication  Barriers to Learning: None evident. REQUIRES PT FOLLOW UP: No       Patient Diagnosis(es): The encounter diagnosis was Dizziness.      has a past medical history of Allergic rhinitis, cause unspecified, Arthritis, Atrial fibrillation ([facility] [address] Bronchopneumonia, CAD (coronary artery disease), Cerebral artery occlusion with cerebral infarction [facility]), Chronic gouty arthropathy, Chronic kidney disease, Congestive heart pinning, HTN, OA, thyroid disease, CVA, gout, AFIB, CHF, mitral valve stenosis, aortic valve insufficiency  Response To Previous Treatment: Not applicable  Family / Caregiver Present: No  Diagnosis: dizziness  Follows Commands: Within Functional Limits  General Comment  Comments: Patient supine in bed. Subjective  Subjective: Patient denies any current dizziness but describes LLE pain and difficulty walking since her CABG in August 2019. She states she uses a walker intermittently as needed. Pain Screening  Patient Currently in Pain: Yes  Pain Assessment  Pain Assessment: Faces  Patient's Stated Pain Goal: 2  Pain Type: Chronic pain;Surgical pain  Pain Location: Leg  Pain Orientation: Left  Vital Signs  Patient Currently in Pain: Yes  Pre Treatment Pain Screening  Pain at present: 0    Orientation  Orientation  Overall Orientation Status: Within Normal Limits     Social/Functional History  Social/Functional History  Lives With: Spouse  Type of Home: House  Home Layout: One level  Home Access: Level entry  Bathroom Shower/Tub: Walk-in shower  Bathroom Toilet: Standard  Bathroom Equipment: Toilet raiser, Grab bars in shower, Built-in shower seat, Grab bars around toilet  Bathroom Accessibility: Accessible  Home Equipment: Rolling walker, Cane, [employer]  ADL Assistance: Independent  Homemaking Assistance: Independent  Homemaking Responsibilities: Yes  Ambulation Assistance: Independent  Transfer Assistance: Independent  Active : Yes  Additional Comments: Pt denies recent falls     Objective  Observation/Palpation  Posture: Good  Observation: Limping w/ decreased LLE WB during ambulation.     AROM RLE (degrees)  RLE AROM: WNL  AROM LLE (degrees)  LLE AROM : WNL  Strength RLE  Strength RLE: WFL  Strength LLE  Strength LLE: WFL     Bed mobility  Supine to Sit: Independent  Sit to Supine: Independent  Scooting: Independent  Transfers  Sit to Stand: Modified independent  Stand to sit: Modified

## 2020-02-07 NOTE — PROGRESS NOTES
Refused kayaxelate- states any liquid medication makes her throw up - message sent to hospitalist to make awaare -potassium 5.6 today

## 2020-02-07 NOTE — PLAN OF CARE
Problem: HEMODYNAMIC STATUS  Goal: Patient has stable vital signs and fluid balance  Outcome: Ongoing     Vitals:    02/07/20 0140   BP: 110/62   Pulse: 71   Resp: 16   Temp: 98 °F (36.7 °C)   SpO2:     Vitals stable  Problem: ACTIVITY INTOLERANCE/IMPAIRED MOBILITY  Goal: Mobility/activity is maintained at optimum level for patient  Outcome: Ongoing     Problem: COMMUNICATION IMPAIRMENT  Goal: Ability to express needs and understand communication  Outcome: Ongoing   Patient speaks clearly and able to make needs known  Problem: Pain:  Goal: Pain level will decrease  Description  Pain level will decrease  Outcome: Ongoing  Pain assessed using 0-10 scale, patient able to voice pain level and states pain improved with prn medication administered.

## 2020-02-07 NOTE — PLAN OF CARE
TELEMETRY REMOVED FOR DISCHARGE- LAC IV SITE REMOVED- NO BLEEDING OR DISCOLORATION AT SITE- NOW DRESSING FOR DISCHARGE - DENIES ANY NEEDS FOR HOME

## 2020-02-07 NOTE — PLAN OF CARE
Awake alert and oriented x 4- worked with PT this am - fall precautions removed- ambulating room independently now - numbness tingling BLE - chronic- am medications now given - call light in reach

## 2020-02-07 NOTE — CARE COORDINATION
Discharge Planning Assessment    RN/SW discharge planner met with patient/ (and family member) to discuss reason for admission, current living situation, and potential needs at the time of discharge    Demographics/Insurance verified Yes    Current type of dwelling:one floor home    Living arrangements: spouse    Level of function/Support: I-DEONDRE Pardo MD    DME: walker    Active with any community resources/agencies/skilled home care: previously used [address]    Medication compliance issues: no    Financial issues that could impact healthcare: no    Transportation at the time of discharge: family [place]

## 2020-02-07 NOTE — PLAN OF CARE
Potassium little high. Will try kayexalate. No more dizziness.  Await neuro recs- possible dc later today

## 2020-02-07 NOTE — DISCHARGE SUMMARY
Hospital Medicine Discharge Summary    Patient ID: David Gutierrez      Patient's PCP: Princess Meek MD    Admit Date: 2/6/2020     Discharge Date:   2/7/2020    Admitting Physician: Cheyenne Soliman MD     Discharge Physician: Cheyenne Soliman MD     Discharge Diagnoses: Active Hospital Problems    Diagnosis    Dizziness [R42]       The patient was seen and examined on day of discharge and this discharge summary is in conjunction with any daily progress note from day of discharge. History Of Present Illness:    78 y.o. female with history of atrial fibrillation, TIA, chronic kidney disease, CHF, hypertension, hyperlipidemia, mitral valve disease, hypothyroidism woke up this morning around 6:00. She took her shower soon after that around 8:30,  she started feeling dizzy and feeling strange inside her head. Denies any headache or chest pain or palpitations or vertigo. She got concerned and came in for evaluation. A week ago she had an episode during which she felt little strange in the head and felt her left side of face was numb which lasted for a few seconds. No history of seizures. Compliantly taking medications. History of neuropathy         Hospital Course:     Dizziness: acute on chronic. Stroke team was called from the emergency room. CT head nonacute. CTA head and neck was not done secondary to CKD. TPA was not suggested. MRI could not be done as a pacemaker is not compatible. Neuro consulted. recent echocardiogram noted. Thought to be peripheral etiology. Continued plavix and  Xarelto. Allergic to statins. advised out pt vestibular training if symptoms recur.      CKD stage 3: at baseline      Idiopathic peripheral neuropathy:  follows with Dr. Ryan Grullon. continued Neurontin.  Previous TSH B12 and serum protein immunofixation were normal.     Benign essential tremor :  Topamax        Atrial fibrillation:  Xarelto and lopressor        Peripheral vascular disease: plavix        Hypothyroidism: synthroid             Physical Exam Performed:     BP (!) 98/53   Pulse 83   Temp 97.2 °F (36.2 °C) (Oral)   Resp 16   Ht 5' 3\" (1.6 m)   Wt 136 lb 1.6 oz (61.7 kg)   SpO2 94%   BMI 24.11 kg/m²       General appearance:  No apparent distress, appears stated age and cooperative. HEENT:  Normal cephalic, atraumatic without obvious deformity. Pupils equal, round, and reactive to light. Extra ocular muscles intact. Conjunctivae/corneas clear. Neck: Supple, with full range of motion. No jugular venous distention. Trachea midline. Respiratory:  Normal respiratory effort. Clear to auscultation, bilaterally without Rales/Wheezes/Rhonchi. Cardiovascular:  Regular rate and rhythm with normal S1/S2 without murmurs, rubs or gallops. Abdomen: Soft, non-tender, non-distended with normal bowel sounds. Musculoskeletal:  No clubbing, cyanosis or edema bilaterally. Full range of motion without deformity. Skin: Skin color, texture, turgor normal.  No rashes or lesions. Neurologic:  Neurovascularly intact without any focal sensory/motor deficits. Cranial nerves: II-XII intact, grossly non-focal.  Psychiatric:  Alert and oriented, thought content appropriate, normal insight  Capillary Refill: Brisk,< 3 seconds   Peripheral Pulses: +2 palpable, equal bilaterally       Labs:  For convenience and continuity at follow-up the following most recent labs are provided:      CBC:    Lab Results   Component Value Date    WBC 7.1 02/07/2020    HGB 13.1 02/07/2020    HCT 42.5 02/07/2020     02/07/2020       Renal:    Lab Results   Component Value Date     02/07/2020    K 5.6 02/07/2020     02/07/2020    CO2 20 02/07/2020    BUN 35 02/07/2020    CREATININE 1.7 02/07/2020    CALCIUM 7.9 02/07/2020    PHOS 4.3 01/31/2020         Significant Diagnostic Studies    Radiology:   VL DUP CAROTID BILATERAL         XR CHEST PORTABLE   Final Result   Mild congestive changes with small left-sided effusion         CT Head WO Contrast   Final Result   No acute intracranial abnormality. Critical results were called by Dr. Dalia Horner MD to [address] [place] [address] on 2/6/2020 at 10:39. Consults:     IP CONSULT TO HOSPITALIST  IP CONSULT TO NEUROLOGY    Disposition:  Home      Condition at Discharge: Stable    Discharge Instructions/Follow-up:  F/u with PCP in 1 week     Code Status:  Full Code     Activity: activity as tolerated    Diet: renal diet      Discharge Medications:     Current Discharge Medication List           Details   !! allopurinol (ZYLOPRIM) 300 MG tablet Take 300 mg by mouth daily      famotidine (PEPCID) 20 MG tablet Take 1 tablet by mouth 2 times daily as needed (nausea)  Qty: 30 tablet, Refills: 0      torsemide (DEMADEX) 10 MG tablet Take 3 tablets by mouth daily  Qty: 60 tablet, Refills: 1      HYDROcodone-acetaminophen (NORCO) 5-325 MG per tablet Take 1 tablet by mouth 4 times daily as needed for Pain for up to 30 days. Qty: 120 tablet, Refills: 0    Comments: Reduce doses taken as pain becomes manageable  Associated Diagnoses: Primary osteoarthritis involving multiple joints      topiramate (TOPAMAX) 100 MG tablet Take 1 tablet by mouth 2 times daily  Qty: 180 tablet, Refills: 0      spironolactone (ALDACTONE) 25 MG tablet Take 0.5 tablets by mouth daily  Qty: 45 tablet, Refills: 0      tiZANidine (ZANAFLEX) 4 MG tablet Take 4 mg by mouth every 6 hours as needed      vitamin D (ERGOCALCIFEROL) 1.25 MG (83957 UT) CAPS capsule Take 50,000 Units by mouth once a week      rivaroxaban (XARELTO) 15 MG TABS tablet Take 15 mg by mouth daily       metoprolol (LOPRESSOR) 100 MG tablet Take 1 tablet by mouth 2 times daily  Qty: 180 tablet, Refills: 0      levothyroxine (SYNTHROID) 112 MCG tablet TAKE 1 TABLET DAILY  Qty: 90 tablet, Refills: 1      !! allopurinol (ZYLOPRIM) 100 MG tablet Take 1 tablet by mouth daily Take along with 300mg tablet for total of 400mg.   Qty:

## 2020-02-08 LAB
CHOLESTEROL, TOTAL: 117 MG/DL (ref 0–199)
HDLC SERPL-MCNC: 22 MG/DL (ref 40–60)
LDL CHOLESTEROL CALCULATED: 54 MG/DL
TRIGL SERPL-MCNC: 203 MG/DL (ref 0–150)
VLDLC SERPL CALC-MCNC: 41 MG/DL

## 2020-02-10 ENCOUNTER — OFFICE VISIT (OUTPATIENT)
Dept: SURGERY | Age: 80
End: 2020-02-10
Payer: MEDICARE

## 2020-02-10 ENCOUNTER — HOSPITAL ENCOUNTER (OUTPATIENT)
Age: 80
Discharge: HOME | End: 2020-02-10
Payer: MEDICARE

## 2020-02-10 VITALS — BODY MASS INDEX: 24.62 KG/M2 | WEIGHT: 139 LBS | SYSTOLIC BLOOD PRESSURE: 100 MMHG | DIASTOLIC BLOOD PRESSURE: 60 MMHG

## 2020-02-10 LAB
ALBUMIN SERPL-MCNC: 3.4 G/DL (ref 3.4–5)
ANION GAP SERPL CALCULATED.3IONS-SCNC: 11 MMOL/L (ref 3–16)
BUN BLDV-MCNC: 21 MG/DL (ref 7–20)
CALCIUM SERPL-MCNC: 9.7 MG/DL (ref 8.3–10.6)
CHLORIDE BLD-SCNC: 110 MMOL/L (ref 99–110)
CO2: 21 MMOL/L (ref 21–32)
CREAT SERPL-MCNC: 1.5 MG/DL (ref 0.6–1.2)
GFR AFRICAN AMERICAN: 40
GFR NON-AFRICAN AMERICAN: 33
GLUCOSE BLD-MCNC: 99 MG/DL (ref 70–99)
PHOSPHORUS: 3.7 MG/DL (ref 2.5–4.9)
POTASSIUM SERPL-SCNC: 5.1 MMOL/L (ref 3.5–5.1)
SODIUM BLD-SCNC: 142 MMOL/L (ref 136–145)

## 2020-02-10 PROCEDURE — 99212 OFFICE O/P EST SF 10 MIN: CPT | Performed by: SURGERY

## 2020-02-10 PROCEDURE — 4040F PNEUMOC VAC/ADMIN/RCVD: CPT | Performed by: SURGERY

## 2020-02-10 PROCEDURE — G8427 DOCREV CUR MEDS BY ELIG CLIN: HCPCS | Performed by: SURGERY

## 2020-02-10 PROCEDURE — G8482 FLU IMMUNIZE ORDER/ADMIN: HCPCS | Performed by: SURGERY

## 2020-02-10 PROCEDURE — G8420 CALC BMI NORM PARAMETERS: HCPCS | Performed by: SURGERY

## 2020-02-10 PROCEDURE — 1090F PRES/ABSN URINE INCON ASSESS: CPT | Performed by: SURGERY

## 2020-02-10 PROCEDURE — G8399 PT W/DXA RESULTS DOCUMENT: HCPCS | Performed by: SURGERY

## 2020-02-10 PROCEDURE — 80069 RENAL FUNCTION PANEL: CPT

## 2020-02-10 PROCEDURE — 36415 COLL VENOUS BLD VENIPUNCTURE: CPT

## 2020-02-10 PROCEDURE — 1036F TOBACCO NON-USER: CPT | Performed by: SURGERY

## 2020-02-10 PROCEDURE — 1123F ACP DISCUSS/DSCN MKR DOCD: CPT | Performed by: SURGERY

## 2020-02-10 NOTE — LETTER
[address] [address]  Phone: [phone]  Fax: [phone]    No ref. provider found        February 10, 2020       Patient: Shruthi Miller   MR Number: [id number]   YOB: 1940   Date of Visit: 2/10/2020       Dear Dr. Dev Franks ref. provider found: Thank you for the request for consultation for [facility]. Below are the relevant portions of my assessment and plan of care. Assessment:  59-year-old female who is here for follow-up status post incision and drainage of a perirectal abscess. The I&D site is located on the left side anterior to the anal verge. It is well-healed without evidence of infection. There is still some mild induration which should continue to improve with time. Plan:  Follow-up as needed. If you have questions, please do not hesitate to call me.      Sincerely,        Ruthy Edwards MD     providers:  Shawadna Reynaga MD  [address] [place] [address]

## 2020-02-10 NOTE — COMMUNICATION BODY
Assessment:  66-year-old female who is here for follow-up status post incision and drainage of a perirectal abscess. The I&D site is located on the left side anterior to the anal verge. It is well-healed without evidence of infection. There is still some mild induration which should continue to improve with time. Plan:  Follow-up as needed.

## 2020-02-10 NOTE — TELEPHONE ENCOUNTER
Patient states she is completely out of med, needs refill     Disp Refills Start End    XARELTO 15 MG TABS tablet (Discontinued) 90 tablet 3 2019    [address] Mail Delivery - Lina Larsen [phone] - Z [phone]      Provider out of office        Please advise

## 2020-02-11 ENCOUNTER — TELEPHONE (OUTPATIENT)
Dept: FAMILY MEDICINE CLINIC | Age: 80
End: 2020-02-11

## 2020-02-14 ENCOUNTER — OFFICE VISIT (OUTPATIENT)
Dept: ENT CLINIC | Age: 80
End: 2020-02-14
Payer: MEDICARE

## 2020-02-14 VITALS — DIASTOLIC BLOOD PRESSURE: 64 MMHG | OXYGEN SATURATION: 96 % | SYSTOLIC BLOOD PRESSURE: 132 MMHG | HEART RATE: 70 BPM

## 2020-02-14 PROCEDURE — G8427 DOCREV CUR MEDS BY ELIG CLIN: HCPCS | Performed by: OTOLARYNGOLOGY

## 2020-02-14 PROCEDURE — 4040F PNEUMOC VAC/ADMIN/RCVD: CPT | Performed by: OTOLARYNGOLOGY

## 2020-02-14 PROCEDURE — 1123F ACP DISCUSS/DSCN MKR DOCD: CPT | Performed by: OTOLARYNGOLOGY

## 2020-02-14 PROCEDURE — 99203 OFFICE O/P NEW LOW 30 MIN: CPT | Performed by: OTOLARYNGOLOGY

## 2020-02-14 PROCEDURE — 4130F TOPICAL PREP RX AOE: CPT | Performed by: OTOLARYNGOLOGY

## 2020-02-14 PROCEDURE — 1036F TOBACCO NON-USER: CPT | Performed by: OTOLARYNGOLOGY

## 2020-02-14 PROCEDURE — G8420 CALC BMI NORM PARAMETERS: HCPCS | Performed by: OTOLARYNGOLOGY

## 2020-02-14 PROCEDURE — G8399 PT W/DXA RESULTS DOCUMENT: HCPCS | Performed by: OTOLARYNGOLOGY

## 2020-02-14 PROCEDURE — 1090F PRES/ABSN URINE INCON ASSESS: CPT | Performed by: OTOLARYNGOLOGY

## 2020-02-14 PROCEDURE — G8482 FLU IMMUNIZE ORDER/ADMIN: HCPCS | Performed by: OTOLARYNGOLOGY

## 2020-02-14 RX ORDER — TRIAMCINOLONE ACETONIDE 1 MG/G
CREAM TOPICAL
Qty: 15 G | Refills: 1 | Status: SHIPPED | OUTPATIENT
Start: 2020-02-14 | End: 2020-04-10

## 2020-02-14 ASSESSMENT — ENCOUNTER SYMPTOMS
ALLERGIC/IMMUNOLOGIC NEGATIVE: 1
RESPIRATORY NEGATIVE: 1
EYES NEGATIVE: 1

## 2020-02-14 NOTE — PROGRESS NOTES
nosebleeds. PLAN:     We will use some triamcinolone cream to the right ear twice daily for a month and then reevaluate the situation. The nose will be treated with Bactroban ointment to be used at night and it is suggested that she use saline spray during the day to counter nosebleeds complicated by her blood thinner medication.     Courtney Kendall MD

## 2020-02-19 RX ORDER — ALLOPURINOL 300 MG/1
300 TABLET ORAL DAILY
Qty: 90 TABLET | Refills: 1 | Status: SHIPPED | OUTPATIENT
Start: 2020-02-19 | End: 2020-02-21 | Stop reason: SDUPTHER

## 2020-02-21 RX ORDER — ALLOPURINOL 300 MG/1
300 TABLET ORAL DAILY
Qty: 90 TABLET | Refills: 1 | Status: SHIPPED | OUTPATIENT
Start: 2020-02-21 | End: 2020-09-08 | Stop reason: SDUPTHER

## 2020-02-27 ENCOUNTER — FOLLOWUP TELEPHONE ENCOUNTER (OUTPATIENT)
Dept: INPATIENT UNIT | Age: 80
End: 2020-02-27

## 2020-02-27 ENCOUNTER — OFFICE VISIT (OUTPATIENT)
Dept: SURGERY | Age: 80
End: 2020-02-27
Payer: MEDICARE

## 2020-02-27 VITALS — BODY MASS INDEX: 24.09 KG/M2 | WEIGHT: 136 LBS | SYSTOLIC BLOOD PRESSURE: 128 MMHG | DIASTOLIC BLOOD PRESSURE: 80 MMHG

## 2020-02-27 PROCEDURE — 1123F ACP DISCUSS/DSCN MKR DOCD: CPT | Performed by: SURGERY

## 2020-02-27 PROCEDURE — G8482 FLU IMMUNIZE ORDER/ADMIN: HCPCS | Performed by: SURGERY

## 2020-02-27 PROCEDURE — G8427 DOCREV CUR MEDS BY ELIG CLIN: HCPCS | Performed by: SURGERY

## 2020-02-27 PROCEDURE — 4040F PNEUMOC VAC/ADMIN/RCVD: CPT | Performed by: SURGERY

## 2020-02-27 PROCEDURE — 1090F PRES/ABSN URINE INCON ASSESS: CPT | Performed by: SURGERY

## 2020-02-27 PROCEDURE — G8420 CALC BMI NORM PARAMETERS: HCPCS | Performed by: SURGERY

## 2020-02-27 PROCEDURE — G8399 PT W/DXA RESULTS DOCUMENT: HCPCS | Performed by: SURGERY

## 2020-02-27 PROCEDURE — 1036F TOBACCO NON-USER: CPT | Performed by: SURGERY

## 2020-02-27 PROCEDURE — 99213 OFFICE O/P EST LOW 20 MIN: CPT | Performed by: SURGERY

## 2020-02-27 RX ORDER — AMOXICILLIN AND CLAVULANATE POTASSIUM 875; 125 MG/1; MG/1
1 TABLET, FILM COATED ORAL 2 TIMES DAILY
Qty: 14 TABLET | Refills: 0 | Status: SHIPPED | OUTPATIENT
Start: 2020-02-27 | End: 2020-03-05

## 2020-03-05 ENCOUNTER — OFFICE VISIT (OUTPATIENT)
Dept: VASCULAR SURGERY | Age: 80
End: 2020-03-05
Payer: MEDICARE

## 2020-03-05 VITALS
SYSTOLIC BLOOD PRESSURE: 128 MMHG | DIASTOLIC BLOOD PRESSURE: 70 MMHG | HEIGHT: 63 IN | WEIGHT: 138 LBS | BODY MASS INDEX: 24.45 KG/M2

## 2020-03-05 PROCEDURE — 1036F TOBACCO NON-USER: CPT | Performed by: SURGERY

## 2020-03-05 PROCEDURE — G8420 CALC BMI NORM PARAMETERS: HCPCS | Performed by: SURGERY

## 2020-03-05 PROCEDURE — 1090F PRES/ABSN URINE INCON ASSESS: CPT | Performed by: SURGERY

## 2020-03-05 PROCEDURE — G8427 DOCREV CUR MEDS BY ELIG CLIN: HCPCS | Performed by: SURGERY

## 2020-03-05 PROCEDURE — G8399 PT W/DXA RESULTS DOCUMENT: HCPCS | Performed by: SURGERY

## 2020-03-05 PROCEDURE — 1123F ACP DISCUSS/DSCN MKR DOCD: CPT | Performed by: SURGERY

## 2020-03-05 PROCEDURE — G8482 FLU IMMUNIZE ORDER/ADMIN: HCPCS | Performed by: SURGERY

## 2020-03-05 PROCEDURE — 4040F PNEUMOC VAC/ADMIN/RCVD: CPT | Performed by: SURGERY

## 2020-03-05 PROCEDURE — 99212 OFFICE O/P EST SF 10 MIN: CPT | Performed by: SURGERY

## 2020-03-05 NOTE — PROGRESS NOTES
history of femoral to femoral bypass as well as femoral popliteal bypass. She does continue to complain of significant discomfort in both legs with limited activity. She does have easily palpable pedal pulses today on examination consistent with patent intervention. We'll check an arterial duplex to ensure no underlying arterial insufficiency as a cause for this. She does have a history of lumbar disc disease and her symptoms may repeat related to neurogenic claudication. If her duplex is reassuring would proceed with further evaluation of her lumbar spine.  - VL DUP LOWER EXTREMITY ARTERIES BILATERAL; Future        Thank you for allowing me to participate in the care of this individual.  Please do not hesitate to contact me with any questions. Sandeep Fuentes M.D., FACS.  3/5/2020  1:33 PM

## 2020-03-09 ENCOUNTER — HOSPITAL ENCOUNTER (OUTPATIENT)
Dept: VASCULAR LAB | Age: 80
Discharge: HOME | End: 2020-03-09
Payer: MEDICARE

## 2020-03-09 PROCEDURE — 93925 LOWER EXTREMITY STUDY: CPT

## 2020-03-10 RX ORDER — LEVOTHYROXINE SODIUM 112 UG/1
TABLET ORAL
Qty: 90 TABLET | Refills: 1 | Status: SHIPPED | OUTPATIENT
Start: 2020-03-10 | End: 2020-08-04

## 2020-03-13 ENCOUNTER — TELEPHONE (OUTPATIENT)
Dept: VASCULAR SURGERY | Age: 80
End: 2020-03-13

## 2020-03-16 ENCOUNTER — OFFICE VISIT (OUTPATIENT)
Dept: FAMILY MEDICINE CLINIC | Age: 80
End: 2020-03-16
Payer: MEDICARE

## 2020-03-16 ENCOUNTER — NURSE TRIAGE (OUTPATIENT)
Dept: OTHER | Facility: CLINIC | Age: 80
End: 2020-03-16

## 2020-03-16 VITALS
WEIGHT: 141.8 LBS | BODY MASS INDEX: 25.12 KG/M2 | HEART RATE: 71 BPM | SYSTOLIC BLOOD PRESSURE: 122 MMHG | OXYGEN SATURATION: 97 % | TEMPERATURE: 98.3 F | DIASTOLIC BLOOD PRESSURE: 76 MMHG

## 2020-03-16 PROCEDURE — 99213 OFFICE O/P EST LOW 20 MIN: CPT | Performed by: NURSE PRACTITIONER

## 2020-03-16 RX ORDER — CEFDINIR 300 MG/1
300 CAPSULE ORAL 2 TIMES DAILY
Qty: 14 CAPSULE | Refills: 0 | Status: SHIPPED | OUTPATIENT
Start: 2020-03-16 | End: 2021-04-28 | Stop reason: SDUPTHER

## 2020-03-16 ASSESSMENT — ENCOUNTER SYMPTOMS
VOMITING: 0
NAUSEA: 0
SINUS PRESSURE: 0
SINUS PAIN: 0
SHORTNESS OF BREATH: 0
CONSTIPATION: 0
DIARRHEA: 0
WHEEZING: 1
RHINORRHEA: 0
COUGH: 1
SORE THROAT: 0
CHEST TIGHTNESS: 1

## 2020-03-16 NOTE — PROGRESS NOTES
prior to visit.        Allergies   Allergen Reactions    Aspirin Nausea Only    Ativan [Lorazepam] Other (See Comments)     hallucinations    Diltiazem Anaphylaxis    Sulfa Antibiotics Rash and Hives    Dabigatran Nausea Only     And indigestion  And indigestion    Aka Pradaxa    Lipitor [Atorvastatin] Other (See Comments)     Muscle pains    Mysoline [Primidone]     Nsaids     Other Other (See Comments)     Nitroglycerin patches causes severe headaches     Past Medical History:   Diagnosis Date    Allergic rhinitis, cause unspecified     Arthritis     Atrial fibrillation ([address]     Bronchopneumonia     CAD (coronary artery disease)     stent:  post cataract surgery (CABG)    Cerebral artery occlusion with cerebral infarction (HCC)     TIA    Chronic gouty arthropathy     Chronic kidney disease     Congestive heart failure, unspecified     Degeneration of cervical intervertebral disc     Essential and other specified forms of tremor     Gout     HIGH CHOLESTEROL     History of CVA (cerebrovascular accident)     Hx of blood clots     Hypertension     Influenza 12/23/2017    Mitral valve stenosis and aortic valve insufficiency     Movement disorder     back problems    Pacemaker     Peptic ulcer, unspecified site, unspecified as acute or chronic, without mention of hemorrhage, perforation, or obstruction     Thyroid disease     Unspecified disorder of kidney and ureter     Unspecified hypothyroidism       Past Surgical History:   Procedure Laterality Date    BACK SURGERY      CARDIAC CATHETERIZATION  7/2012    CARDIAC CATHETERIZATION  08/07/2018    Unsuccesful  of RCA    CARDIAC SURGERY      CABG & Cardiac ablation    CHOLECYSTECTOMY      CORONARY ARTERY BYPASS GRAFT  1987    LIMA- Diag/LAD, SVG- RCA    FEMORAL BYPASS Left 8/22/2019    LEFT FEMORAL TO POPLITEAL BYPASS GRAFT performed by Cecilia Hill MD at [address] FEMORAL-FEMORAL BYPASS GRAFT N/A 8/22/2019    FEMORAL daily for 7 days  Dispense: 14 capsule; Refill: 0      Return for unresolved symptoms. This dictation was generated by voice recognition computer software. Although all attempts are made to edit the dictation for accuracy, there may be errors in the transcription that are not intended.

## 2020-03-18 RX ORDER — METOPROLOL TARTRATE 100 MG/1
100 TABLET ORAL 2 TIMES DAILY
Qty: 180 TABLET | Refills: 0 | Status: ON HOLD | OUTPATIENT
Start: 2020-03-18 | End: 2020-03-28 | Stop reason: HOSPADM

## 2020-03-24 ENCOUNTER — HOSPITAL ENCOUNTER (INPATIENT)
Age: 80
LOS: 3 days | Discharge: HOME | DRG: 291 | End: 2020-03-28
Attending: EMERGENCY MEDICINE | Admitting: HOSPITALIST
Payer: MEDICARE

## 2020-03-24 PROCEDURE — 93005 ELECTROCARDIOGRAM TRACING: CPT | Performed by: EMERGENCY MEDICINE

## 2020-03-24 PROCEDURE — 99285 EMERGENCY DEPT VISIT HI MDM: CPT

## 2020-03-25 ENCOUNTER — APPOINTMENT (OUTPATIENT)
Dept: GENERAL RADIOLOGY | Age: 80
DRG: 291 | End: 2020-03-25
Payer: MEDICARE

## 2020-03-25 PROBLEM — I50.9 HEART FAILURE (HCC): Status: ACTIVE | Noted: 2020-03-25

## 2020-03-25 LAB
A/G RATIO: 1.1 (ref 1.1–2.2)
ALBUMIN SERPL-MCNC: 3.6 G/DL (ref 3.4–5)
ALP BLD-CCNC: 109 U/L (ref 40–129)
ALT SERPL-CCNC: 18 U/L (ref 10–40)
ANION GAP SERPL CALCULATED.3IONS-SCNC: 12 MMOL/L (ref 3–16)
ANION GAP SERPL CALCULATED.3IONS-SCNC: 14 MMOL/L (ref 3–16)
ANISOCYTOSIS: ABNORMAL
AST SERPL-CCNC: 21 U/L (ref 15–37)
BASOPHILS ABSOLUTE: 0.2 K/UL (ref 0–0.2)
BASOPHILS RELATIVE PERCENT: 0.9 %
BILIRUB SERPL-MCNC: 0.6 MG/DL (ref 0–1)
BILIRUBIN URINE: NEGATIVE
BLOOD, URINE: NEGATIVE
BUN BLDV-MCNC: 30 MG/DL (ref 7–20)
BUN BLDV-MCNC: 31 MG/DL (ref 7–20)
CALCIUM SERPL-MCNC: 9.3 MG/DL (ref 8.3–10.6)
CALCIUM SERPL-MCNC: 9.3 MG/DL (ref 8.3–10.6)
CHLORIDE BLD-SCNC: 110 MMOL/L (ref 99–110)
CHLORIDE BLD-SCNC: 113 MMOL/L (ref 99–110)
CLARITY: CLEAR
CO2: 15 MMOL/L (ref 21–32)
CO2: 15 MMOL/L (ref 21–32)
COLOR: YELLOW
CREAT SERPL-MCNC: 1.3 MG/DL (ref 0.6–1.2)
CREAT SERPL-MCNC: 1.4 MG/DL (ref 0.6–1.2)
EKG ATRIAL RATE: 77 BPM
EKG DIAGNOSIS: NORMAL
EKG P AXIS: 72 DEGREES
EKG P-R INTERVAL: 210 MS
EKG Q-T INTERVAL: 374 MS
EKG QRS DURATION: 96 MS
EKG QTC CALCULATION (BAZETT): 423 MS
EKG R AXIS: 1 DEGREES
EKG T AXIS: 90 DEGREES
EKG VENTRICULAR RATE: 77 BPM
EOSINOPHILS ABSOLUTE: 0.1 K/UL (ref 0–0.6)
EOSINOPHILS RELATIVE PERCENT: 0.8 %
EPITHELIAL CELLS, UA: 1 /HPF (ref 0–5)
ESTIMATED AVERAGE GLUCOSE: 108.3 MG/DL
GFR AFRICAN AMERICAN: 44
GFR AFRICAN AMERICAN: 48
GFR NON-AFRICAN AMERICAN: 36
GFR NON-AFRICAN AMERICAN: 39
GLOBULIN: 3.2 G/DL
GLUCOSE BLD-MCNC: 149 MG/DL (ref 70–99)
GLUCOSE BLD-MCNC: 161 MG/DL (ref 70–99)
GLUCOSE URINE: NEGATIVE MG/DL
HBA1C MFR BLD: 5.4 %
HCT VFR BLD CALC: 43.1 % (ref 36–48)
HEMOGLOBIN: 13.5 G/DL (ref 12–16)
HYALINE CASTS: 1 /LPF (ref 0–8)
INR BLD: 1.38 (ref 0.86–1.14)
IRON SATURATION: 5 % (ref 15–50)
IRON: 14 UG/DL (ref 37–145)
KETONES, URINE: NEGATIVE MG/DL
LACTIC ACID: 1.2 MMOL/L (ref 0.4–2)
LEUKOCYTE ESTERASE, URINE: ABNORMAL
LYMPHOCYTES ABSOLUTE: 0.8 K/UL (ref 1–5.1)
LYMPHOCYTES RELATIVE PERCENT: 4.4 %
MACROCYTES: ABNORMAL
MAGNESIUM: 2 MG/DL (ref 1.8–2.4)
MCH RBC QN AUTO: 26 PG (ref 26–34)
MCHC RBC AUTO-ENTMCNC: 31.4 G/DL (ref 31–36)
MCV RBC AUTO: 82.8 FL (ref 80–100)
MICROCYTES: ABNORMAL
MICROSCOPIC EXAMINATION: YES
MONOCYTES ABSOLUTE: 1.3 K/UL (ref 0–1.3)
MONOCYTES RELATIVE PERCENT: 6.8 %
NEUTROPHILS ABSOLUTE: 16 K/UL (ref 1.7–7.7)
NEUTROPHILS RELATIVE PERCENT: 87.1 %
NITRITE, URINE: NEGATIVE
OVALOCYTES: ABNORMAL
PDW BLD-RTO: 23.6 % (ref 12.4–15.4)
PH UA: 5 (ref 5–8)
PLATELET # BLD: 345 K/UL (ref 135–450)
PMV BLD AUTO: 9.2 FL (ref 5–10.5)
POLYCHROMASIA: ABNORMAL
POTASSIUM REFLEX MAGNESIUM: 4.1 MMOL/L (ref 3.5–5.1)
POTASSIUM SERPL-SCNC: 4.1 MMOL/L (ref 3.5–5.1)
PRO-BNP: 6044 PG/ML (ref 0–449)
PROCALCITONIN: 0.2 NG/ML (ref 0–0.15)
PROTEIN UA: ABNORMAL MG/DL
PROTHROMBIN TIME: 16.1 SEC (ref 10–13.2)
RAPID INFLUENZA  B AGN: NEGATIVE
RAPID INFLUENZA A AGN: NEGATIVE
RBC # BLD: 5.21 M/UL (ref 4–5.2)
RBC UA: 2 /HPF (ref 0–4)
SCHISTOCYTES: ABNORMAL
SODIUM BLD-SCNC: 139 MMOL/L (ref 136–145)
SODIUM BLD-SCNC: 140 MMOL/L (ref 136–145)
SPECIFIC GRAVITY UA: 1.01 (ref 1–1.03)
T4 FREE: 1.1 NG/DL (ref 0.9–1.8)
TOTAL IRON BINDING CAPACITY: 302 UG/DL (ref 260–445)
TOTAL PROTEIN: 6.8 G/DL (ref 6.4–8.2)
TROPONIN: 0.01 NG/ML
TROPONIN: 0.01 NG/ML
TROPONIN: 0.02 NG/ML
TSH SERPL DL<=0.05 MIU/L-ACNC: 3.98 UIU/ML (ref 0.27–4.2)
URINE REFLEX TO CULTURE: YES
URINE TYPE: ABNORMAL
UROBILINOGEN, URINE: 0.2 E.U./DL
WBC # BLD: 18.3 K/UL (ref 4–11)
WBC UA: 10 /HPF (ref 0–5)

## 2020-03-25 PROCEDURE — 87086 URINE CULTURE/COLONY COUNT: CPT

## 2020-03-25 PROCEDURE — 6370000000 HC RX 637 (ALT 250 FOR IP): Performed by: HOSPITALIST

## 2020-03-25 PROCEDURE — U0002 COVID-19 LAB TEST NON-CDC: HCPCS

## 2020-03-25 PROCEDURE — 93010 ELECTROCARDIOGRAM REPORT: CPT | Performed by: INTERNAL MEDICINE

## 2020-03-25 PROCEDURE — 84145 PROCALCITONIN (PCT): CPT

## 2020-03-25 PROCEDURE — 2580000003 HC RX 258: Performed by: HOSPITALIST

## 2020-03-25 PROCEDURE — 83880 ASSAY OF NATRIURETIC PEPTIDE: CPT

## 2020-03-25 PROCEDURE — 6360000002 HC RX W HCPCS: Performed by: EMERGENCY MEDICINE

## 2020-03-25 PROCEDURE — 2500000003 HC RX 250 WO HCPCS: Performed by: HOSPITALIST

## 2020-03-25 PROCEDURE — 80053 COMPREHEN METABOLIC PANEL: CPT

## 2020-03-25 PROCEDURE — 84439 ASSAY OF FREE THYROXINE: CPT

## 2020-03-25 PROCEDURE — 2060000000 HC ICU INTERMEDIATE R&B

## 2020-03-25 PROCEDURE — 71045 X-RAY EXAM CHEST 1 VIEW: CPT

## 2020-03-25 PROCEDURE — 94760 N-INVAS EAR/PLS OXIMETRY 1: CPT

## 2020-03-25 PROCEDURE — 36415 COLL VENOUS BLD VENIPUNCTURE: CPT

## 2020-03-25 PROCEDURE — 84484 ASSAY OF TROPONIN QUANT: CPT

## 2020-03-25 PROCEDURE — 81001 URINALYSIS AUTO W/SCOPE: CPT

## 2020-03-25 PROCEDURE — 87449 NOS EACH ORGANISM AG IA: CPT

## 2020-03-25 PROCEDURE — 87040 BLOOD CULTURE FOR BACTERIA: CPT

## 2020-03-25 PROCEDURE — 83605 ASSAY OF LACTIC ACID: CPT

## 2020-03-25 PROCEDURE — 85610 PROTHROMBIN TIME: CPT

## 2020-03-25 PROCEDURE — 87804 INFLUENZA ASSAY W/OPTIC: CPT

## 2020-03-25 PROCEDURE — 96374 THER/PROPH/DIAG INJ IV PUSH: CPT

## 2020-03-25 PROCEDURE — 83735 ASSAY OF MAGNESIUM: CPT

## 2020-03-25 PROCEDURE — 99223 1ST HOSP IP/OBS HIGH 75: CPT | Performed by: INTERNAL MEDICINE

## 2020-03-25 PROCEDURE — 84443 ASSAY THYROID STIM HORMONE: CPT

## 2020-03-25 PROCEDURE — 83036 HEMOGLOBIN GLYCOSYLATED A1C: CPT

## 2020-03-25 PROCEDURE — 83550 IRON BINDING TEST: CPT

## 2020-03-25 PROCEDURE — 83540 ASSAY OF IRON: CPT

## 2020-03-25 PROCEDURE — 85025 COMPLETE CBC W/AUTO DIFF WBC: CPT

## 2020-03-25 PROCEDURE — 6360000002 HC RX W HCPCS: Performed by: HOSPITALIST

## 2020-03-25 RX ORDER — POTASSIUM CHLORIDE 20 MEQ/1
40 TABLET, EXTENDED RELEASE ORAL PRN
Status: DISCONTINUED | OUTPATIENT
Start: 2020-03-25 | End: 2020-03-25

## 2020-03-25 RX ORDER — FUROSEMIDE 10 MG/ML
40 INJECTION INTRAMUSCULAR; INTRAVENOUS ONCE
Status: COMPLETED | OUTPATIENT
Start: 2020-03-25 | End: 2020-03-25

## 2020-03-25 RX ORDER — SPIRONOLACTONE 25 MG/1
12.5 TABLET ORAL DAILY
Status: DISCONTINUED | OUTPATIENT
Start: 2020-03-25 | End: 2020-03-28 | Stop reason: HOSPADM

## 2020-03-25 RX ORDER — MAGNESIUM SULFATE IN WATER 40 MG/ML
2 INJECTION, SOLUTION INTRAVENOUS PRN
Status: DISCONTINUED | OUTPATIENT
Start: 2020-03-25 | End: 2020-03-25

## 2020-03-25 RX ORDER — ONDANSETRON 2 MG/ML
4 INJECTION INTRAMUSCULAR; INTRAVENOUS EVERY 6 HOURS PRN
Status: DISCONTINUED | OUTPATIENT
Start: 2020-03-25 | End: 2020-03-28 | Stop reason: HOSPADM

## 2020-03-25 RX ORDER — PROMETHAZINE HYDROCHLORIDE 25 MG/1
12.5 TABLET ORAL EVERY 6 HOURS PRN
Status: DISCONTINUED | OUTPATIENT
Start: 2020-03-25 | End: 2020-03-28 | Stop reason: HOSPADM

## 2020-03-25 RX ORDER — ACETAMINOPHEN 325 MG/1
650 TABLET ORAL EVERY 6 HOURS PRN
Status: DISCONTINUED | OUTPATIENT
Start: 2020-03-25 | End: 2020-03-25 | Stop reason: SDUPTHER

## 2020-03-25 RX ORDER — ACETAMINOPHEN 650 MG/1
650 SUPPOSITORY RECTAL EVERY 6 HOURS PRN
Status: DISCONTINUED | OUTPATIENT
Start: 2020-03-25 | End: 2020-03-25 | Stop reason: SDUPTHER

## 2020-03-25 RX ORDER — FUROSEMIDE 10 MG/ML
40 INJECTION INTRAMUSCULAR; INTRAVENOUS 2 TIMES DAILY
Status: DISCONTINUED | OUTPATIENT
Start: 2020-03-25 | End: 2020-03-26

## 2020-03-25 RX ORDER — ACETAMINOPHEN 325 MG/1
650 TABLET ORAL EVERY 6 HOURS PRN
Status: DISCONTINUED | OUTPATIENT
Start: 2020-03-25 | End: 2020-03-28 | Stop reason: HOSPADM

## 2020-03-25 RX ORDER — LEVOTHYROXINE SODIUM 112 UG/1
112 TABLET ORAL DAILY
Status: DISCONTINUED | OUTPATIENT
Start: 2020-03-25 | End: 2020-03-25 | Stop reason: SDUPTHER

## 2020-03-25 RX ORDER — LEVOTHYROXINE SODIUM 112 UG/1
112 TABLET ORAL
Status: DISCONTINUED | OUTPATIENT
Start: 2020-03-25 | End: 2020-03-28 | Stop reason: HOSPADM

## 2020-03-25 RX ORDER — POLYETHYLENE GLYCOL 3350 17 G/17G
17 POWDER, FOR SOLUTION ORAL DAILY PRN
Status: DISCONTINUED | OUTPATIENT
Start: 2020-03-25 | End: 2020-03-28 | Stop reason: HOSPADM

## 2020-03-25 RX ORDER — METOPROLOL TARTRATE 50 MG/1
100 TABLET, FILM COATED ORAL 2 TIMES DAILY
Status: DISCONTINUED | OUTPATIENT
Start: 2020-03-25 | End: 2020-03-25

## 2020-03-25 RX ORDER — POTASSIUM CHLORIDE 7.45 MG/ML
10 INJECTION INTRAVENOUS PRN
Status: DISCONTINUED | OUTPATIENT
Start: 2020-03-25 | End: 2020-03-25

## 2020-03-25 RX ORDER — TOPIRAMATE 25 MG/1
100 TABLET ORAL 2 TIMES DAILY
Status: DISCONTINUED | OUTPATIENT
Start: 2020-03-25 | End: 2020-03-28

## 2020-03-25 RX ORDER — FAMOTIDINE 20 MG/1
20 TABLET, FILM COATED ORAL 2 TIMES DAILY PRN
Status: DISCONTINUED | OUTPATIENT
Start: 2020-03-25 | End: 2020-03-28 | Stop reason: HOSPADM

## 2020-03-25 RX ORDER — SODIUM CHLORIDE 0.9 % (FLUSH) 0.9 %
10 SYRINGE (ML) INJECTION EVERY 12 HOURS SCHEDULED
Status: DISCONTINUED | OUTPATIENT
Start: 2020-03-25 | End: 2020-03-28 | Stop reason: HOSPADM

## 2020-03-25 RX ORDER — NITROGLYCERIN 0.4 MG/1
0.4 TABLET SUBLINGUAL EVERY 5 MIN PRN
Status: DISCONTINUED | OUTPATIENT
Start: 2020-03-25 | End: 2020-03-28 | Stop reason: HOSPADM

## 2020-03-25 RX ORDER — CLOPIDOGREL BISULFATE 75 MG/1
75 TABLET ORAL DAILY
Status: DISCONTINUED | OUTPATIENT
Start: 2020-03-25 | End: 2020-03-28 | Stop reason: HOSPADM

## 2020-03-25 RX ORDER — SODIUM CHLORIDE 0.9 % (FLUSH) 0.9 %
10 SYRINGE (ML) INJECTION PRN
Status: DISCONTINUED | OUTPATIENT
Start: 2020-03-25 | End: 2020-03-28 | Stop reason: HOSPADM

## 2020-03-25 RX ORDER — ACETAMINOPHEN 650 MG/1
650 SUPPOSITORY RECTAL EVERY 6 HOURS PRN
Status: DISCONTINUED | OUTPATIENT
Start: 2020-03-25 | End: 2020-03-28 | Stop reason: HOSPADM

## 2020-03-25 RX ORDER — ALLOPURINOL 300 MG/1
300 TABLET ORAL DAILY
Status: DISCONTINUED | OUTPATIENT
Start: 2020-03-25 | End: 2020-03-28 | Stop reason: HOSPADM

## 2020-03-25 RX ADMIN — Medication 10 ML: at 09:28

## 2020-03-25 RX ADMIN — DOXYCYCLINE 100 MG: 100 INJECTION, POWDER, LYOPHILIZED, FOR SOLUTION INTRAVENOUS at 17:05

## 2020-03-25 RX ADMIN — CLOPIDOGREL 75 MG: 75 TABLET, FILM COATED ORAL at 09:28

## 2020-03-25 RX ADMIN — SPIRONOLACTONE 12.5 MG: 25 TABLET ORAL at 09:29

## 2020-03-25 RX ADMIN — Medication 10 ML: at 17:06

## 2020-03-25 RX ADMIN — ALLOPURINOL 300 MG: 300 TABLET ORAL at 09:28

## 2020-03-25 RX ADMIN — Medication 10 ML: at 21:54

## 2020-03-25 RX ADMIN — METOPROLOL TARTRATE 100 MG: 50 TABLET, FILM COATED ORAL at 09:28

## 2020-03-25 RX ADMIN — TOPIRAMATE 100 MG: 25 TABLET, FILM COATED ORAL at 21:54

## 2020-03-25 RX ADMIN — FUROSEMIDE 40 MG: 10 INJECTION, SOLUTION INTRAMUSCULAR; INTRAVENOUS at 17:06

## 2020-03-25 RX ADMIN — Medication 10 ML: at 07:56

## 2020-03-25 RX ADMIN — FUROSEMIDE 40 MG: 10 INJECTION, SOLUTION INTRAMUSCULAR; INTRAVENOUS at 03:29

## 2020-03-25 RX ADMIN — Medication 1 G: at 07:55

## 2020-03-25 RX ADMIN — LEVOTHYROXINE SODIUM 112 MCG: 112 TABLET ORAL at 07:55

## 2020-03-25 RX ADMIN — FUROSEMIDE 40 MG: 10 INJECTION, SOLUTION INTRAMUSCULAR; INTRAVENOUS at 09:28

## 2020-03-25 RX ADMIN — ACETAMINOPHEN 650 MG: 325 TABLET, FILM COATED ORAL at 04:59

## 2020-03-25 RX ADMIN — METOPROLOL TARTRATE 100 MG: 50 TABLET, FILM COATED ORAL at 21:54

## 2020-03-25 RX ADMIN — TOPIRAMATE 100 MG: 25 TABLET, FILM COATED ORAL at 09:27

## 2020-03-25 RX ADMIN — RIVAROXABAN 15 MG: 15 TABLET, FILM COATED ORAL at 09:27

## 2020-03-25 RX ADMIN — DOXYCYCLINE 100 MG: 100 INJECTION, POWDER, LYOPHILIZED, FOR SOLUTION INTRAVENOUS at 07:55

## 2020-03-25 ASSESSMENT — PAIN SCALES - GENERAL
PAINLEVEL_OUTOF10: 5
PAINLEVEL_OUTOF10: 0
PAINLEVEL_OUTOF10: 0

## 2020-03-25 NOTE — PROGRESS NOTES
[facility] PROGRESS NOTE    3/25/2020 10:08 AM        Name: Celso Hinkle . Admitted: 3/24/2020  Primary Care Provider: Luca Back MD (Tel: [phone])      Subjective:  Patient presented to hospital with hx atrial fib, CAD/CABG, TIA, CKD, sCHF (45%), HTN, hypothyroidism, PAD/bypass. She presented to hospital with fever and cough, completed 7 day course antibiotic from PCP. Notes increased leg edema, weight gain, and weakness with fall at home. CXR with evidence of CHF, no focal consolidation, proBNP 6044. WBC elevated 18.3    Presently resting in bed. States she is feeling a little better this morning. Denies shortness of breath at rest, no cough this am. She is extremely weak. HOB up to sleep.      Reviewed interval ancillary notes    Current Medications  allopurinol (ZYLOPRIM) tablet 300 mg, Daily  clopidogrel (PLAVIX) tablet 75 mg, Daily  famotidine (PEPCID) tablet 20 mg, BID PRN  metoprolol tartrate (LOPRESSOR) tablet 100 mg, BID  rivaroxaban (XARELTO) tablet 15 mg, Daily  spironolactone (ALDACTONE) tablet 12.5 mg, Daily  topiramate (TOPAMAX) tablet 100 mg, BID  sodium chloride flush 0.9 % injection 10 mL, 2 times per day  sodium chloride flush 0.9 % injection 10 mL, PRN  acetaminophen (TYLENOL) tablet 650 mg, Q6H PRN    Or  acetaminophen (TYLENOL) suppository 650 mg, Q6H PRN  polyethylene glycol (GLYCOLAX) packet 17 g, Daily PRN  promethazine (PHENERGAN) tablet 12.5 mg, Q6H PRN    Or  ondansetron (ZOFRAN) injection 4 mg, Q6H PRN  furosemide (LASIX) injection 40 mg, BID  nitroGLYCERIN (NITROSTAT) SL tablet 0.4 mg, Q5 Min PRN  doxycycline (VIBRAMYCIN) 100 mg in dextrose 5 % 100 mL IVPB, Q12H  levothyroxine (SYNTHROID) tablet 112 mcg, QAM AC  cefTRIAXone (ROCEPHIN) 1 g in sterile water 10 mL IV syringe, Q24H        Objective:  /71   Pulse 73   Temp 98.1 °F (36.7 °C) (Oral)   Resp 18   Ht 5' 3\"

## 2020-03-25 NOTE — PROGRESS NOTES
Nutrition Assessment    Type and Reason for Visit: Initial, Consult(+IP for MST 2 poor appetite and wt. )    Nutrition Recommendations:   1. Continue current diet  2. RD ordered Ensure Compact BID  3. Encourage po    Nutrition Assessment: Pt is at risk for nutritional compromise d/t poor appetite and wt loss over the past 2 months. Pt is currently in isolation and RD unable to visit pt. Pt has lost 10lbs in 2 months resulting in a -7% change in wt. Pt does have a hx of CHF and is currently on lasix. RD to order Ensure compact BID. Malnutrition Assessment:  · Malnutrition Status: Insufficient data  · Context: Chronic illness  · Findings of the 6 clinical characteristics of malnutrition (Minimum of 2 out of 6 clinical characteristics is required to make the diagnosis of moderate or severe Protein Calorie Malnutrition based on AND/ASPEN Guidelines):  1. Energy Intake-Unable to assess, Unable to assess    2. Weight Loss-10% loss or greater, (2 months)  3. Fat Loss-Unable to assess,    4. Muscle Loss-Unable to assess,    5. Fluid Accumulation-No significant fluid accumulation,    6.   Strength-Not measured    Nutrition Risk Level: High    Nutrient Needs:  · Estimated Daily Total Kcal: 2880-8155  · Estimated Daily Protein (g): 68-74 gms(1.2-1.3 gms)  · Estimated Daily Total Fluid (ml/day): 1 ml/kcal    Nutrition Diagnosis:   · Problem: Inadequate energy intake  · Etiology: related to Insufficient energy/nutrient consumption     Signs and symptoms:  as evidenced by Weight loss    Objective Information:  · Nutrition-Focused Physical Findings: No edema  · Wound Type: None  · Current Nutrition Therapies:  · Oral Diet Orders: 2gm Sodium   · Oral Diet intake: Unable to assess  · Oral Nutrition Supplement (ONS) Orders: None  · Anthropometric Measures:  · Ht: 5' 3\" (160 cm)   · Current Body Wt: 126 lb (57.2 kg)  · % Weight Change:  ,  -10% change in wt in 2 months  · Ideal Body Wt: 115 lb (52.2 kg), % Ideal Body · BMI Classification: BMI 18.5 - 24.9 Normal Weight    Nutrition Interventions:   Continue current diet, Start ONS  Continued Inpatient Monitoring, Education Not Indicated    Nutrition Evaluation:   · Evaluation: Goals set   · Goals: PO 50% or more at meals and supp    · Monitoring: Meal Intake, Supplement Intake, Weight, I&O      Electronically signed by Kenyon Ashley RD, CNSC, LD on 3/25/20 at 1:13 PM EDT    Contact Number: [phone]

## 2020-03-25 NOTE — CONSULTS
Aj Bourne MD   40 mg at 03/25/20 0928    nitroGLYCERIN (NITROSTAT) SL tablet 0.4 mg  0.4 mg Sublingual Q5 Min PRN Yuliet Kim MD        doxycycline (VIBRAMYCIN) 100 mg in dextrose 5 % 100 mL IVPB  100 mg Intravenous Q12H Yuliet Kim MD   Stopped at 03/25/20 0928    levothyroxine (SYNTHROID) tablet 112 mcg  112 mcg Oral QAM AC Liana Lee MD   112 mcg at 03/25/20 0755    cefTRIAXone (ROCEPHIN) 1 g in sterile water 10 mL IV syringe  1 g Intravenous Q24H Yuliet Kim MD   1 g at 03/25/20 8703       Past Medical History:   Diagnosis Date    Allergic rhinitis, cause unspecified     Arthritis     [facility]     Bronchopneumonia     CAD (coronary artery disease)     stent:  post cataract surgery (CABG)    Cerebral artery occlusion with cerebral infarction ([facility]     TIA    Chronic gouty arthropathy     Chronic kidney disease     Congestive heart failure, unspecified     Degeneration of cervical intervertebral disc     Essential and other specified forms of tremor     Gout     HIGH CHOLESTEROL     History of CVA (cerebrovascular accident)     Hx of blood clots     Hypertension     Influenza 12/23/2017    Mitral valve stenosis and aortic valve insufficiency     Movement disorder     back problems    Pacemaker     Peptic ulcer, unspecified site, unspecified as acute or chronic, without mention of hemorrhage, perforation, or obstruction     Thyroid disease     Unspecified disorder of kidney and ureter     Unspecified hypothyroidism      Past Surgical History:   Procedure Laterality Date    BACK SURGERY      CARDIAC CATHETERIZATION  7/2012    CARDIAC CATHETERIZATION  08/07/2018    Unsuccesful  of RCA    CARDIAC SURGERY      CABG & Cardiac ablation    CHOLECYSTECTOMY      CORONARY ARTERY BYPASS GRAFT  1987    LIMA- Diag/LAD, SVG- RCA    FEMORAL BYPASS Left 8/22/2019    LEFT FEMORAL TO POPLITEAL BYPASS GRAFT performed by Griffin Ch MD at [facility] °F (36.4 °C)    TempSrc: Oral Oral Oral    SpO2:  96% 97% 95%   Weight: 126 lb (57.2 kg)      Height: 5' 3\" (1.6 m)        Body mass index is 22.32 kg/m². Wt Readings from Last 3 Encounters:   03/25/20 126 lb (57.2 kg)   03/16/20 141 lb 12.8 oz (64.3 kg)   03/05/20 138 lb (62.6 kg)     BP Readings from Last 3 Encounters:   03/25/20 126/66   03/16/20 122/76   03/05/20 128/70     Constitutional and General Appearance:   WD/WN in NAD  HEENT:  NC/AT  KRISTINA  No problems with hearing  Skin:  Warm, dry  Respiratory:  · Normal excursion and expansion without use of accessory muscles  · Resp Auscultation: Normal breath sounds without dullness  Cardiovascular:  · The apical impulses not displaced  · Heart tones are crisp and normal  · Cervical veins are not engorged  · The carotid upstroke is normal in amplitude and contour without delay or bruit  · JVP 9-10 cm h2o  RRR with nl S1 and S2 without m,r,g  · Peripheral pulses are symmetrical and full  · There is no clubbing, cyanosis of the extremities. · No edema  · Femoral Arteries: 2+ and equal  · Pedal Pulses: 2+ and equal   Neck:  · No thyromegaly  Abdomen:  Increased abdominal girth  · No masses or tenderness  · Liver/Spleen: No Abnormalities Noted  Neurological/Psychiatric:  · Alert and oriented in all spheres  · Moves all extremities well  · Exhibits normal gait balance and coordination  · No abnormalities of mood, affect, memory, mentation, or behavior are noted    Labs were reviewed including labs from other hospital systems through [facility]. Cardiac testing was reviewed including echos, nuclear scans, cardiac catheterization, including from other hospital systems through [facility]. Assessment:    1. Pulmonary venous congestion    2. Shortness of breath    3. General weakness    4. Leukocytosis, unspecified type     5. Cough  6. Elevated troponin    Plan:  1. Repeat troponin in am  2. Continue IV lasix  3.   Decrease metoprolol 75 mg po bid to

## 2020-03-25 NOTE — H&P
throat. Respiratory:  As above   Cardiovascular:   Negative for  chest pain, palpitations   Gastrointestinal:   Negative for nausea, vomiting, diarrhea  Genitourinary:   Negative for polyuria, dysuria   Hematologic/Lymphatic:   Negative for  bleeding tendency, easy bruising  Musculoskeletal:   Negative for myalgias and arthralgias  Neurologic:   Negative for  Confusion, dysarthria. Skin:   Negative for itching,rash  Psychiatric:  Negative for depression, anxiety, agitation. Endocrine:  Negative for polydipsia, polyuria,heat /cold intolerance. Past Medical History:         has a past medical history of Allergic rhinitis, cause unspecified, Arthritis, Atrial fibrillation ([address] Bronchopneumonia, CAD (coronary artery disease), Cerebral artery occlusion with cerebral infarction ([address] Chronic gouty arthropathy, Chronic kidney disease, Congestive heart failure, unspecified, Degeneration of cervical intervertebral disc, Essential and other specified forms of tremor, Gout, HIGH CHOLESTEROL, History of CVA (cerebrovascular accident), Hx of blood clots, Hypertension, Influenza, Mitral valve stenosis and aortic valve insufficiency, Movement disorder, Pacemaker, Peptic ulcer, unspecified site, unspecified as acute or chronic, without mention of hemorrhage, perforation, or obstruction, Thyroid disease, Unspecified disorder of kidney and ureter, and Unspecified hypothyroidism. Past Surgical History:         has a past surgical history that includes back surgery; Cholecystectomy; Cardiac surgery; Coronary artery bypass graft (1987); shoulder surgery; Cardiac catheterization (7/2012); hip surgery (Left, 03/16/2017); joint replacement; Cardiac catheterization (08/07/2018); Percutaneous Transluminal Coronary Angio (11/04/2014); pr inject anes/steroid foramen lumbar/sacral w img guide ,1 level (Right, 12/3/2018); Femoral-femoral Bypass Graft (N/A, 8/22/2019); and femoral bypass (Left, 8/22/2019). Medications:          Current Outpatient Medications on File Prior to Encounter   Medication Sig Dispense Refill    metoprolol (LOPRESSOR) 100 MG tablet Take 1 tablet by mouth 2 times daily 180 tablet 0    levothyroxine (SYNTHROID) 112 MCG tablet TAKE 1 TABLET DAILY 90 tablet 1    allopurinol (ZYLOPRIM) 300 MG tablet Take 1 tablet by mouth daily 90 tablet 1    triamcinolone (KENALOG) 0.1 % cream Apply topically 2 times daily. Apply to lesion on right ear. 15 g 1    mupirocin (BACTROBAN) 2 % ointment Apply topically 2 times daily. Apply with Q tip to each nostril. 15 g 0    rivaroxaban (XARELTO) 15 MG TABS tablet Take 1 tablet by mouth daily 90 tablet 3    famotidine (PEPCID) 20 MG tablet Take 1 tablet by mouth 2 times daily as needed (nausea) 30 tablet 0    torsemide (DEMADEX) 10 MG tablet Take 3 tablets by mouth daily 60 tablet 1    topiramate (TOPAMAX) 100 MG tablet Take 1 tablet by mouth 2 times daily 180 tablet 0    spironolactone (ALDACTONE) 25 MG tablet Take 0.5 tablets by mouth daily 45 tablet 0    tiZANidine (ZANAFLEX) 4 MG tablet Take 4 mg by mouth every 6 hours as needed      vitamin D (ERGOCALCIFEROL) 1.25 MG (59381 UT) CAPS capsule Take 50,000 Units by mouth once a week      clopidogrel (PLAVIX) 75 MG tablet Take 1 tablet by mouth daily 90 tablet 1    meclizine (ANTIVERT) 12.5 MG tablet Take 1 tablet by mouth 3 times daily as needed for Dizziness or Nausea 90 tablet 0         Allergies:   Allergies   Allergen Reactions    Aspirin Nausea Only    Ativan [Lorazepam] Other (See Comments)     hallucinations    Diltiazem Anaphylaxis    Sulfa Antibiotics Rash and Hives    Dabigatran Nausea Only     And indigestion  And indigestion    Aka Pradaxa    Lipitor [Atorvastatin] Other (See Comments)     Muscle pains    Mysoline [Primidone]     Nsaids     Other Other (See Comments)     Nitroglycerin patches causes severe headaches          Social History:   reports that she quit smoking about

## 2020-03-25 NOTE — ED PROVIDER NOTES
ointment Apply topically 2 times daily. Apply with Q tip to each nostril. 15 g 0    rivaroxaban (XARELTO) 15 MG TABS tablet Take 1 tablet by mouth daily 90 tablet 3    famotidine (PEPCID) 20 MG tablet Take 1 tablet by mouth 2 times daily as needed (nausea) 30 tablet 0    torsemide (DEMADEX) 10 MG tablet Take 3 tablets by mouth daily 60 tablet 1    topiramate (TOPAMAX) 100 MG tablet Take 1 tablet by mouth 2 times daily 180 tablet 0    spironolactone (ALDACTONE) 25 MG tablet Take 0.5 tablets by mouth daily 45 tablet 0    tiZANidine (ZANAFLEX) 4 MG tablet Take 4 mg by mouth every 6 hours as needed      vitamin D (ERGOCALCIFEROL) 1.25 MG (27078 UT) CAPS capsule Take 50,000 Units by mouth once a week      clopidogrel (PLAVIX) 75 MG tablet Take 1 tablet by mouth daily 90 tablet 1    meclizine (ANTIVERT) 12.5 MG tablet Take 1 tablet by mouth 3 times daily as needed for Dizziness or Nausea 90 tablet 0     Allergies   Allergen Reactions    Aspirin Nausea Only    Ativan [Lorazepam] Other (See Comments)     hallucinations    Diltiazem Anaphylaxis    Sulfa Antibiotics Rash and Hives    Dabigatran Nausea Only     And indigestion  And indigestion    Aka Pradaxa    Lipitor [Atorvastatin] Other (See Comments)     Muscle pains    Mysoline [Primidone]     Nsaids     Other Other (See Comments)     Nitroglycerin patches causes severe headaches       REVIEW OF SYSTEMS  10 systems reviewed, pertinent positives per HPI otherwise noted to be negative. PHYSICAL EXAM  BP (!) 132/47   Pulse 86   Temp 97.7 °F (36.5 °C) (Oral)   Resp 24   SpO2 95%    GENERAL APPEARANCE: Awake and alert. Cooperative. No distress. Fatigued appearing. HENT: Normocephalic. Atraumatic. Mucous membranes are dry. NECK: Supple. EYES: PERRL. EOM's grossly intact. HEART/CHEST: RRR. No murmurs. No chest wall tenderness. LUNGS: Respirations shallow, a little labored, but conversational.  Rhonchi at bases L>R. No wheezing.   ABDOMEN: No 6.4 - 8.2 g/dL    Alb 3.6 3.4 - 5.0 g/dL    Albumin/Globulin Ratio 1.1 1.1 - 2.2    Total Bilirubin 0.6 0.0 - 1.0 mg/dL    Alkaline Phosphatase 109 40 - 129 U/L    ALT 18 10 - 40 U/L    AST 21 15 - 37 U/L    Globulin 3.2 g/dL   Lactic Acid, Plasma   Result Value Ref Range    Lactic Acid 1.2 0.4 - 2.0 mmol/L   Troponin   Result Value Ref Range    Troponin 0.01 <0.01 ng/mL   Brain Natriuretic Peptide   Result Value Ref Range    Pro-BNP 6,044 (H) 0 - 449 pg/mL   Protime-INR   Result Value Ref Range    Protime 16.1 (H) 10.0 - 13.2 sec    INR 1.38 (H) 0.86 - 1.14   EKG 12 Lead   Result Value Ref Range    Ventricular Rate 77 BPM    Atrial Rate 77 BPM    P-R Interval 210 ms    QRS Duration 96 ms    Q-T Interval 374 ms    QTc Calculation (Bazett) 423 ms    P Axis 72 degrees    R Axis 1 degrees    T Axis 90 degrees    Diagnosis       Sinus rhythm with 1st degree A-V block with Premature supraventricular complexesLeft ventricular hypertrophy with repolarization abnormalityCannot rule out Septal infarct , age undetermined     The 12 lead EKG was interpreted by me as follows:  Rate: normal with a rate of 77  Rhythm: sinus with sinus arrhythmia  Axis: normal   Demand pacemaker noted  Intervals: first degree AVB, narrow QRS  ST segments: no ST elevations or depressions   Artifact limiting interpretation  T waves: no abnormal inversions  Non-specific T wave changes: present  Prior EKG comparison: EKG dated 2/6/20 is not significantly different      RADIOLOGY    Xr Chest Portable    Result Date: 3/25/2020  Cardiomegaly with pulmonary vascular congestion. No focal consolidation. ED COURSE/MDM  Patient seen and evaluated. Old records reviewed. Labs and imaging reviewed and results discussed with patient.       After initial evaluation, differential diagnostic considerations included: acute coronary syndrome, pulmonary embolism, COPD/asthma, pneumonia, sepsis, pericardial tamponade, pneumothorax, CHF, thoracic aortic dissection,

## 2020-03-25 NOTE — PROGRESS NOTES
Assessment complete. VSS no SOB or any distress noted. Assisted pt up to the bathroom. No c/o of pain at this time. PIV redressed but has to come out for leaking. Pt aware that another IV has to be started. POC discussed and agreed upon. Call light within reach, pt encouraged to call if any needs arise. No further requests at this time. Will continue to monitor.

## 2020-03-26 LAB
ANION GAP SERPL CALCULATED.3IONS-SCNC: 14 MMOL/L (ref 3–16)
BASOPHILS ABSOLUTE: 0.1 K/UL (ref 0–0.2)
BASOPHILS RELATIVE PERCENT: 0.7 %
BUN BLDV-MCNC: 39 MG/DL (ref 7–20)
CALCIUM SERPL-MCNC: 9.1 MG/DL (ref 8.3–10.6)
CHLORIDE BLD-SCNC: 109 MMOL/L (ref 99–110)
CHOLESTEROL, TOTAL: 120 MG/DL (ref 0–199)
CO2: 17 MMOL/L (ref 21–32)
CREAT SERPL-MCNC: 1.5 MG/DL (ref 0.6–1.2)
EOSINOPHILS ABSOLUTE: 0.4 K/UL (ref 0–0.6)
EOSINOPHILS RELATIVE PERCENT: 2.6 %
GFR AFRICAN AMERICAN: 40
GFR NON-AFRICAN AMERICAN: 33
GLUCOSE BLD-MCNC: 110 MG/DL (ref 70–99)
HCT VFR BLD CALC: 41.4 % (ref 36–48)
HDLC SERPL-MCNC: 29 MG/DL (ref 40–60)
HEMOGLOBIN: 13.1 G/DL (ref 12–16)
L. PNEUMOPHILA SEROGP 1 UR AG: NORMAL
LDL CHOLESTEROL CALCULATED: 72 MG/DL
LYMPHOCYTES ABSOLUTE: 1.3 K/UL (ref 1–5.1)
LYMPHOCYTES RELATIVE PERCENT: 9.4 %
MAGNESIUM: 1.7 MG/DL (ref 1.8–2.4)
MCH RBC QN AUTO: 25.9 PG (ref 26–34)
MCHC RBC AUTO-ENTMCNC: 31.5 G/DL (ref 31–36)
MCV RBC AUTO: 82.1 FL (ref 80–100)
MONOCYTES ABSOLUTE: 1.2 K/UL (ref 0–1.3)
MONOCYTES RELATIVE PERCENT: 8.8 %
NEUTROPHILS ABSOLUTE: 11 K/UL (ref 1.7–7.7)
NEUTROPHILS RELATIVE PERCENT: 78.5 %
PDW BLD-RTO: 23.9 % (ref 12.4–15.4)
PLATELET # BLD: 339 K/UL (ref 135–450)
PMV BLD AUTO: 9.2 FL (ref 5–10.5)
POTASSIUM SERPL-SCNC: 3.8 MMOL/L (ref 3.5–5.1)
PROCALCITONIN: 0.73 NG/ML (ref 0–0.15)
RBC # BLD: 5.05 M/UL (ref 4–5.2)
SODIUM BLD-SCNC: 140 MMOL/L (ref 136–145)
STREP PNEUMONIAE ANTIGEN, URINE: NORMAL
TRIGL SERPL-MCNC: 97 MG/DL (ref 0–150)
TROPONIN: 0.01 NG/ML
URINE CULTURE, ROUTINE: NORMAL
VLDLC SERPL CALC-MCNC: 19 MG/DL
WBC # BLD: 13.9 K/UL (ref 4–11)

## 2020-03-26 PROCEDURE — 2060000000 HC ICU INTERMEDIATE R&B

## 2020-03-26 PROCEDURE — 6360000002 HC RX W HCPCS: Performed by: NURSE PRACTITIONER

## 2020-03-26 PROCEDURE — 6370000000 HC RX 637 (ALT 250 FOR IP): Performed by: HOSPITALIST

## 2020-03-26 PROCEDURE — 80061 LIPID PANEL: CPT

## 2020-03-26 PROCEDURE — 36415 COLL VENOUS BLD VENIPUNCTURE: CPT

## 2020-03-26 PROCEDURE — 85025 COMPLETE CBC W/AUTO DIFF WBC: CPT

## 2020-03-26 PROCEDURE — 84145 PROCALCITONIN (PCT): CPT

## 2020-03-26 PROCEDURE — 83735 ASSAY OF MAGNESIUM: CPT

## 2020-03-26 PROCEDURE — 6360000002 HC RX W HCPCS: Performed by: HOSPITALIST

## 2020-03-26 PROCEDURE — 80048 BASIC METABOLIC PNL TOTAL CA: CPT

## 2020-03-26 PROCEDURE — 6370000000 HC RX 637 (ALT 250 FOR IP): Performed by: NURSE PRACTITIONER

## 2020-03-26 PROCEDURE — 84484 ASSAY OF TROPONIN QUANT: CPT

## 2020-03-26 PROCEDURE — 2580000003 HC RX 258: Performed by: HOSPITALIST

## 2020-03-26 PROCEDURE — 6370000000 HC RX 637 (ALT 250 FOR IP): Performed by: INTERNAL MEDICINE

## 2020-03-26 PROCEDURE — 2500000003 HC RX 250 WO HCPCS: Performed by: HOSPITALIST

## 2020-03-26 PROCEDURE — 99233 SBSQ HOSP IP/OBS HIGH 50: CPT | Performed by: INTERNAL MEDICINE

## 2020-03-26 RX ORDER — MAGNESIUM SULFATE IN WATER 40 MG/ML
2 INJECTION, SOLUTION INTRAVENOUS ONCE
Status: COMPLETED | OUTPATIENT
Start: 2020-03-26 | End: 2020-03-26

## 2020-03-26 RX ORDER — BENZONATATE 100 MG/1
100 CAPSULE ORAL 3 TIMES DAILY PRN
Status: DISCONTINUED | OUTPATIENT
Start: 2020-03-26 | End: 2020-03-28 | Stop reason: HOSPADM

## 2020-03-26 RX ORDER — TORSEMIDE 20 MG/1
30 TABLET ORAL DAILY
Status: DISCONTINUED | OUTPATIENT
Start: 2020-03-27 | End: 2020-03-28 | Stop reason: HOSPADM

## 2020-03-26 RX ADMIN — RIVAROXABAN 15 MG: 15 TABLET, FILM COATED ORAL at 09:03

## 2020-03-26 RX ADMIN — TOPIRAMATE 100 MG: 25 TABLET, FILM COATED ORAL at 09:03

## 2020-03-26 RX ADMIN — METOPROLOL TARTRATE 75 MG: 50 TABLET, FILM COATED ORAL at 09:03

## 2020-03-26 RX ADMIN — TOPIRAMATE 100 MG: 25 TABLET, FILM COATED ORAL at 21:02

## 2020-03-26 RX ADMIN — MAGNESIUM SULFATE HEPTAHYDRATE 2 G: 40 INJECTION, SOLUTION INTRAVENOUS at 14:38

## 2020-03-26 RX ADMIN — METOPROLOL TARTRATE 75 MG: 50 TABLET, FILM COATED ORAL at 21:02

## 2020-03-26 RX ADMIN — DOXYCYCLINE 100 MG: 100 INJECTION, POWDER, LYOPHILIZED, FOR SOLUTION INTRAVENOUS at 18:06

## 2020-03-26 RX ADMIN — BENZONATATE 100 MG: 100 CAPSULE ORAL at 14:38

## 2020-03-26 RX ADMIN — SPIRONOLACTONE 12.5 MG: 25 TABLET ORAL at 09:03

## 2020-03-26 RX ADMIN — ALLOPURINOL 300 MG: 300 TABLET ORAL at 09:03

## 2020-03-26 RX ADMIN — Medication 10 ML: at 21:02

## 2020-03-26 RX ADMIN — FUROSEMIDE 40 MG: 10 INJECTION, SOLUTION INTRAMUSCULAR; INTRAVENOUS at 09:04

## 2020-03-26 RX ADMIN — LEVOTHYROXINE SODIUM 112 MCG: 112 TABLET ORAL at 06:37

## 2020-03-26 RX ADMIN — Medication 10 ML: at 10:02

## 2020-03-26 RX ADMIN — DOXYCYCLINE 100 MG: 100 INJECTION, POWDER, LYOPHILIZED, FOR SOLUTION INTRAVENOUS at 06:37

## 2020-03-26 RX ADMIN — CLOPIDOGREL 75 MG: 75 TABLET, FILM COATED ORAL at 09:04

## 2020-03-26 RX ADMIN — Medication 1 G: at 06:38

## 2020-03-26 ASSESSMENT — PAIN SCALES - GENERAL
PAINLEVEL_OUTOF10: 0

## 2020-03-26 NOTE — DISCHARGE INSTR - COC
Continuity of Care Form  CHF Pathway      _x_ Daily Visits x 3     _x_Cardiovascular Assessment. Titrate O2 to keep SaO2 greater than 90%    _x_ Daily Weights- Baseline Wt: 125 lb  Call MD if:   3 pound weight gain or loss in one day OR 5 pound weight gain in one week       _x_ Labs:   BMP,BNP     Please start on *3/30   Frequency: Weekly x 4              Fax results to: CHF Clinic: [phone]      _x_ Med List attached:   Hold Coreg/Metoprolol if HR less than 45 or patient symptomatic*   Hold ACE/ARB if SBP less than 85 or patient symptomatic*   Do not hold Spironolactone (aldactone) for hypotension/bradycardia   Call MD for questions: CHF Clinic: [phone]    _x_Follow up appointment with cardiology: date/time: ***          _x_ [facility] for home health  And telehealth monitoring. For CHF/COPD.          Patient Name: Coco Gleason   :    MRN:  [id number]    Admit date:  3/24/2020  Discharge date:  3/28    Code Status Order: Full Code   Advance Directives:   [facility] Documentation     Date/Time Healthcare Directive Type of Healthcare Directive Copy in [address] [place] [address] [address] Agent's Name Healthcare Agent's Phone Number    [phone]  No, patient does not have an advance directive for healthcare treatment -- -- -- -- --          Admitting Physician:  Armando Gore MD  PCP: Jasiel Valles MD    Discharging Nurse: [facility] Unit/Room#: 0OP-9028/2349-02  Discharging Unit Phone Number: [phone]    Emergency Contact:   Extended Emergency Contact Information  Primary Emergency Contact: Jimmyanish Oppenheim  Address: [address]  Phone: [phone]  Mobile Phone: [phone]  Relation: Child  Secondary Emergency Contact: Clive Beavers  [place]  [facility] [place] [address]  Phone: [phone]  Mobile Phone: [phone]  Relation: Spouse    Past Surgical History:  Past Surgical History:   Procedure Laterality Date    BACK SURGERY      CARDIAC CATHETERIZATION  7/2012    CARDIAC CATHETERIZATION  08/07/2018    Unsuccesful  of RCA    CARDIAC SURGERY      CABG & Cardiac ablation    CHOLECYSTECTOMY      CORONARY ARTERY BYPASS GRAFT  1987    LIMA- Diag/LAD, SVG- RCA    FEMORAL BYPASS Left 8/22/2019    LEFT FEMORAL TO POPLITEAL BYPASS GRAFT performed by Junior Bills MD at [address] FEMORAL-FEMORAL BYPASS GRAFT N/A 8/22/2019    FEMORAL TO FEMORAL BYPASS performed by Junior Bills MD at [address] [facility] Left 03/16/2017    Left hip pinning    JOINT REPLACEMENT      [place] INJECT ANES/STEROID FORAMEN LUMBAR/SACRAL W IMG GUIDE ,1 LEVEL Right 12/3/2018    RIGHT L3 AND L4 LUMBAR TRANSFORAMINAL EPIRUAL STEROID INJECTION WITH FLUOROSCOPY performed by Heber Amos MD at [address] [facility]  11/04/2014    MARLENY - 3.0 x 28 to the [address] [facility]      left       Immunization History:   Immunization History   Administered Date(s) Administered    Influenza 09/19/2012, 10/02/2013    Influenza A ([id number]) Vaccine IM 12/21/2009    Influenza Virus Vaccine 10/03/2005, 10/12/2007, 09/28/2012, 10/04/2013, 10/20/2014, 10/05/2015    Influenza Whole 09/14/2011    Influenza, High Dose (Fluzone 65 yrs and older) 10/10/2014, 09/30/2015, 11/02/2016, 10/24/2017, 09/17/2018    Influenza, Triv, inactivated, subunit, adjuvanted, IM (Fluad 65 yrs and older) 09/30/2019    Pneumococcal Conjugate 13-valent ([id number]) 12/04/2015    Pneumococcal Conjugate 7-valent (Prevnar7) 12/03/2009    Pneumococcal Polysaccharide ([id number]) 09/28/2000, 01/28/2008    Tdap (Boostrix, Adacel) 08/17/2011       Active Problems:  Patient Active Problem List   Diagnosis Code    Chronic atrial fibrillation I48.20    Essential hypertension, benign I10    Mixed hyperlipidemia E78.2    Chronic gouty arthropathy M1A. 00X0    Acquired hypothyroidism E03.9    Arthritis M19.90    Non-rheumatic mitral regurgitation I34.0    Restrictive lung disease J98.4    Sick sinus syndrome ([facility]) I49.5    Allergic rhinitis J30.9    Fibrocystic breast N60.19    Cerebrovascular accident (CVA) ([facility]) I63.9    HTN (hypertension), benign I10    Pacemaker Z95.0    Primary osteoarthritis involving multiple joints M15.0    Vitamin D deficiency E55.9    Tremor R25.1    Chronic total occlusion of native coronary artery I25.10, I25.82    Age related osteoporosis M81.0    Chronic systolic congestive heart failure ([facility]) I50.22    Chronic renal failure, stage 3 (moderate) ([facility]) N18.3    PAD (peripheral artery disease) ([facility]) I73.9    Atherosclerosis of native arteries of extremities with intermittent claudication, bilateral legs ([facility]) I70.213    Acute pulmonary edema ([facility]) J81.0    Idiopathic progressive neuropathy 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.3    Ischemic cardiomyopathy I25.5    Dizziness R42    Peripheral vertigo, bilateral H81.393    PAF (paroxysmal atrial fibrillation) ([facility]) I48.0    Dyslipidemia E78.5    Heart failure ([facility]) I50.9       Isolation/Infection:   Isolation          Droplet Plus        Patient Infection Status     Infection Onset Added Last Indicated Last Indicated By Review Planned Expiration Resolved Resolved By    COVID-19 Rule Out 03/25/20 03/25/20 03/25/20 COVID-19 (Ordered)              Nurse Assessment:  Last Vital Signs: BP 94/62   Pulse 75   Temp 97.4 °F (36.3 °C) (Temporal)   Resp 18   Ht 5' 3\" (1.6 m)   Wt 125 lb 11.2 oz (57 kg)   SpO2 96%   BMI 22.27 kg/m²     Last documented pain score (0-10 scale): Pain Level: 0  Last Weight:   Wt Readings from Last 1 Encounters:   03/26/20 125 lb 11.2 oz (57 kg)     Mental Status:  oriented and alert    IV Access:  - None    Nursing Mobility/ADLs:  Walking   Assisted  Transfer  Independent  Bathing  Independent  Dressing  Independent  Toileting  Independent  Feeding  Independent  Med Admin

## 2020-03-26 NOTE — PROGRESS NOTES
[address] [facility] 1940    History:  Past Medical History:   Diagnosis Date    Allergic rhinitis, cause unspecified     Arthritis     Atrial fibrillation ([address]     Bronchopneumonia     CAD (coronary artery disease)     stent:  post cataract surgery (CABG)    Cerebral artery occlusion with cerebral infarction (HCC)     TIA    Chronic gouty arthropathy     Chronic kidney disease     Congestive heart failure, unspecified     Degeneration of cervical intervertebral disc     Essential and other specified forms of tremor     Gout     HIGH CHOLESTEROL     History of CVA (cerebrovascular accident)     Hx of blood clots     Hypertension     Influenza 12/23/2017    Mitral valve stenosis and aortic valve insufficiency     Movement disorder     back problems    Pacemaker     Peptic ulcer, unspecified site, unspecified as acute or chronic, without mention of hemorrhage, perforation, or obstruction     Thyroid disease     Unspecified disorder of kidney and ureter     Unspecified hypothyroidism        ECHO:  Left ventricle - normal size, thickness, reduced function with EF of 45%     LV wall motion - diffuse hypokinesis. Mitral valve - thickened, annular calcification, moderate regurgitation     Aortic valve - thickened, calcified, mild regurgitation     Tricuspid valve - mild regurgitation with PASP of 25mmHg     Pulmonic valve - trivial regurgitation     Biatrial enlargement     Pacemaker / ICD lead is visualized in the right heart. History of sleep apnea: no    [place] Screen ordered: yes        Last Hospital Admission: 2/6/20 with dizziness  Code Status: full code  Discharge plans: patient has been active with [facility] [employer] int he past    Family Present: no    Chaz Waldrop was admitted to the hospital with increased shortness of breath. Patient is well known from previous admission. Patient had been following with the CHF clinic.  She missed most recent appointment due to an admission. Called patient on her room phone for education and interview. Patient states she does weigh herself daily. Baseline with Is around 130 lb. She feels it increased up to 135 lb. Diuretics had been increased as an outpatient. She states she follows a sodium restriction and a 64 oz fluid limitation. She is compliant with medications and with her follow up visits. Discussed importance of lifestyle changes:     PATIENT/CAREGIVER TEACHING:    Level of patient/caregiver understanding able to:   [x ] Verbalize understanding [ ] Demonstrate understanding [ ] Teach back   [ ] Needs reinforcement [ ] Other:       Time spent teaching: 15 mins    1. WEIGHT: Admit Weight: 126 lb (57.2 kg)      Today  Weight: 125 lb 11.2 oz (57 kg)   2. I/O     Intake/Output Summary (Last 24 hours) at 3/26/2020 1539  Last data filed at 3/26/2020 1448  Gross per 24 hour   Intake 520 ml   Output 1200 ml   Net -680 ml       Recommendations: 1. She will need a one week hospital follow up at discharge  2. CHF pathway on angelica in case she discharges with home health again   3. Continue to educate on S/S.   4. Emphasize daily weights, diet, and knowing when and who to call  5. Provided patient with CHF Resource Line for questions and concerns.        NEVA PERSAUD 3/26/2020 3:39 PM

## 2020-03-26 NOTE — PLAN OF CARE
Problem: Falls - Risk of:  Goal: Will remain free from falls  Description: Will remain free from falls  Outcome: Ongoing  Note: Pt stable on her feet. Alert and oriented. Calls for assistance appropriately. Goal: Absence of physical injury  Description: Absence of physical injury  Outcome: Ongoing     Problem: Nutrition  Goal: Optimal nutrition therapy  Outcome: Ongoing     Problem: OXYGENATION/RESPIRATORY FUNCTION  Goal: Patient will maintain patent airway  Outcome: Ongoing  Note: Remains on room air, oxygen WNL. Lung sounds with fine crackles in the bases. Goal: Patient will achieve/maintain normal respiratory rate/effort  Description: Respiratory rate and effort will be within normal limits for the patient  Outcome: Ongoing     Problem: HEMODYNAMIC STATUS  Goal: Patient has stable vital signs and fluid balance  Outcome: Ongoing  Note: On IV lasix. VSS. Not having great output. Cardiology to review. - She has CKD. Problem: FLUID AND ELECTROLYTE IMBALANCE  Goal: Fluid and electrolyte balance are achieved/maintained  Outcome: Ongoing     Problem: ACTIVITY INTOLERANCE/IMPAIRED MOBILITY  Goal: Mobility/activity is maintained at optimum level for patient  Outcome: Ongoing  Note: Pt up mostly independent.

## 2020-03-26 NOTE — PROGRESS NOTES
03/26/2020     Lab Results   Component Value Date    CREATININE 1.5 (H) 03/26/2020    BUN 39 (H) 03/26/2020     03/26/2020    K 3.8 03/26/2020     03/26/2020    CO2 17 (L) 03/26/2020     Lab Results   Component Value Date    INR 1.38 (H) 03/25/2020    PROTIME 16.1 (H) 03/25/2020        Physical Examination:    BP 94/62   Pulse 75   Temp 97.4 °F (36.3 °C) (Temporal)   Resp 18   Ht 5' 3\" (1.6 m)   Wt 125 lb 11.2 oz (57 kg)   SpO2 96%   BMI 22.27 kg/m²      WD/WN  HEENT:  NC/AT  Respiratory:  · Resp Assessment: Normal respiratory effort  · Resp Auscultation: Clear to auscultation bilaterally   Cardiovascular:  · Auscultation: regular rate and rhythm, normal S1S2, no murmur, rub or gallop  · Palpation:  Nl PMI  · JVP:  normal  · Extremities: No Edema  Abdomen:  · Soft, non-tender  · Normal bowel sounds  Extremities:  ·  No Cyanosis or Clubbing  Neurological/Psychiatric:  · Oriented to time, place, and person  · Non-anxious  Skin Warm and dry    Lab Results   Component Value Date     03/26/2020     03/25/2020     03/25/2020    K 3.8 03/26/2020    K 4.1 03/25/2020    K 4.1 03/25/2020    K 5.1 02/10/2020    K 5.6 02/07/2020    K 5.1 02/06/2020    BUN 39 03/26/2020    BUN 30 03/25/2020    BUN 31 03/25/2020    CREATININE 1.5 03/26/2020    CREATININE 1.4 03/25/2020    CREATININE 1.3 03/25/2020    GLUCOSE 110 03/26/2020    GLUCOSE 149 03/25/2020    GLUCOSE 104 01/31/2020    GLUCOSE 109 07/17/2019     Lab Results   Component Value Date    PROBNP 6,044 (H) 03/25/2020    PROBNP 5,977 (H) 01/16/2020    PROBNP 5,795 (H) 12/30/2019     Lab Results   Component Value Date    ALT 18 03/25/2020    ALT 12 02/06/2020    AST 21 03/25/2020    AST 16 02/06/2020     Lab Results   Component Value Date    HGB 13.1 03/26/2020    HGB 13.5 03/25/2020    HCT 41.4 03/26/2020    HCT 43.1 03/25/2020     03/26/2020     03/25/2020     Lab Results   Component Value Date    TRIG 97 03/26/2020    TRIG 203

## 2020-03-26 NOTE — PROGRESS NOTES
The [place] Sleepiness Scale       The [place] Sleepiness Scale is widely used in the field of sleep medicine as a subjective measure of a patient's sleepiness. The test is a list of eight situations in which you rate your tendency to become sleepy on a scale of 0, no chance to 3, high chance of dozing. Your score is based on a scale of 0 to 24. The scale estimates whether you are experiencing excessive sleepiness that possibly requires medical attention. How Sleepy Are You? How sleepy are you to doze off or fall asleep in the following situations? You should rate your chances of dozing off, not just feeling tired. Even if you have not done some of these things recently try to determine how they would have affected you.  For each situation, decide whether or not you would have:     0 = No chance of dozing 1 = Slight chance of dozing   2 = Moderate chance of  dozing 3 = High change of dozing       Situation                                                                                     Chance of Dozing    Sitting and reading  0 =  [x]  1 =    [] 2 =    [] 3 =    []    Watching TV  0 =  []  1 =    [] 2 =    [x] 3 =    []      Sitting inactive in public place (e.g., a theater or a meeting)  0 =  []  1 =    [] 2 =    [x] 3 =    []    As a passenger in a car for an hour without a break          0  =  [x]  1 =    [] 2 =    [] 3 =    []    Lying down to rest in the afternoon when circumstances permit    0 =  []  1 =    [x] 2 =    [] 3 =    []    Sitting and talking to someone  0 =  [x]  1 =    [] 2 =    [] 3 =    []      Sitting quietly after a lunch without alcohol  0 =  []  1 =    [] 2 =    [x] 3 =    []    In a car, while stopped for a few minutes in traffic                                                                      0 =  [x]  1 =    [] 2 =    [] 3 =    []    Total Score = 7    If your total score is 10 or greater, you are experiencing excessive sleepiness and should consider seeking a medical

## 2020-03-27 PROBLEM — I50.23 ACUTE ON CHRONIC SYSTOLIC (CONGESTIVE) HEART FAILURE (HCC): Status: ACTIVE | Noted: 2018-12-28

## 2020-03-27 LAB
ANION GAP SERPL CALCULATED.3IONS-SCNC: 16 MMOL/L (ref 3–16)
BUN BLDV-MCNC: 42 MG/DL (ref 7–20)
CALCIUM SERPL-MCNC: 9.3 MG/DL (ref 8.3–10.6)
CHLORIDE BLD-SCNC: 105 MMOL/L (ref 99–110)
CO2: 16 MMOL/L (ref 21–32)
CREAT SERPL-MCNC: 1.6 MG/DL (ref 0.6–1.2)
GFR AFRICAN AMERICAN: 38
GFR NON-AFRICAN AMERICAN: 31
GLUCOSE BLD-MCNC: 114 MG/DL (ref 70–99)
HCT VFR BLD CALC: 42.7 % (ref 36–48)
HEMOGLOBIN: 13.4 G/DL (ref 12–16)
MAGNESIUM: 2.5 MG/DL (ref 1.8–2.4)
MCH RBC QN AUTO: 25.6 PG (ref 26–34)
MCHC RBC AUTO-ENTMCNC: 31.3 G/DL (ref 31–36)
MCV RBC AUTO: 81.7 FL (ref 80–100)
PDW BLD-RTO: 23.2 % (ref 12.4–15.4)
PLATELET # BLD: 361 K/UL (ref 135–450)
PMV BLD AUTO: 9.1 FL (ref 5–10.5)
POTASSIUM SERPL-SCNC: 3.6 MMOL/L (ref 3.5–5.1)
PRO-BNP: 4076 PG/ML (ref 0–449)
PROCALCITONIN: 0.13 NG/ML (ref 0–0.15)
RBC # BLD: 5.23 M/UL (ref 4–5.2)
SODIUM BLD-SCNC: 137 MMOL/L (ref 136–145)
WBC # BLD: 11.9 K/UL (ref 4–11)

## 2020-03-27 PROCEDURE — 2580000003 HC RX 258: Performed by: HOSPITALIST

## 2020-03-27 PROCEDURE — 2500000003 HC RX 250 WO HCPCS: Performed by: HOSPITALIST

## 2020-03-27 PROCEDURE — 83880 ASSAY OF NATRIURETIC PEPTIDE: CPT

## 2020-03-27 PROCEDURE — 6370000000 HC RX 637 (ALT 250 FOR IP): Performed by: HOSPITALIST

## 2020-03-27 PROCEDURE — 83735 ASSAY OF MAGNESIUM: CPT

## 2020-03-27 PROCEDURE — 6360000002 HC RX W HCPCS: Performed by: HOSPITALIST

## 2020-03-27 PROCEDURE — 84145 PROCALCITONIN (PCT): CPT

## 2020-03-27 PROCEDURE — 6370000000 HC RX 637 (ALT 250 FOR IP): Performed by: INTERNAL MEDICINE

## 2020-03-27 PROCEDURE — 99232 SBSQ HOSP IP/OBS MODERATE 35: CPT | Performed by: CLINICAL NURSE SPECIALIST

## 2020-03-27 PROCEDURE — 2060000000 HC ICU INTERMEDIATE R&B

## 2020-03-27 PROCEDURE — 6370000000 HC RX 637 (ALT 250 FOR IP): Performed by: NURSE PRACTITIONER

## 2020-03-27 PROCEDURE — 80048 BASIC METABOLIC PNL TOTAL CA: CPT

## 2020-03-27 PROCEDURE — 36415 COLL VENOUS BLD VENIPUNCTURE: CPT

## 2020-03-27 PROCEDURE — 85027 COMPLETE CBC AUTOMATED: CPT

## 2020-03-27 RX ORDER — MECLIZINE HCL 12.5 MG/1
12.5 TABLET ORAL 3 TIMES DAILY PRN
Status: DISCONTINUED | OUTPATIENT
Start: 2020-03-27 | End: 2020-03-28 | Stop reason: HOSPADM

## 2020-03-27 RX ORDER — DOXYCYCLINE HYCLATE 100 MG
100 TABLET ORAL EVERY 12 HOURS SCHEDULED
Status: DISCONTINUED | OUTPATIENT
Start: 2020-03-27 | End: 2020-03-28 | Stop reason: HOSPADM

## 2020-03-27 RX ADMIN — METOPROLOL TARTRATE 75 MG: 50 TABLET, FILM COATED ORAL at 08:42

## 2020-03-27 RX ADMIN — DOXYCYCLINE HYCLATE 100 MG: 100 TABLET, COATED ORAL at 20:26

## 2020-03-27 RX ADMIN — BENZONATATE 100 MG: 100 CAPSULE ORAL at 06:47

## 2020-03-27 RX ADMIN — TORSEMIDE 30 MG: 20 TABLET ORAL at 08:42

## 2020-03-27 RX ADMIN — CLOPIDOGREL 75 MG: 75 TABLET, FILM COATED ORAL at 08:42

## 2020-03-27 RX ADMIN — ALLOPURINOL 300 MG: 300 TABLET ORAL at 08:42

## 2020-03-27 RX ADMIN — LEVOTHYROXINE SODIUM 112 MCG: 112 TABLET ORAL at 06:36

## 2020-03-27 RX ADMIN — RIVAROXABAN 15 MG: 15 TABLET, FILM COATED ORAL at 08:42

## 2020-03-27 RX ADMIN — METOPROLOL TARTRATE 75 MG: 50 TABLET, FILM COATED ORAL at 20:27

## 2020-03-27 RX ADMIN — Medication 10 ML: at 08:43

## 2020-03-27 RX ADMIN — MECLIZINE 12.5 MG: 12.5 TABLET ORAL at 12:58

## 2020-03-27 RX ADMIN — TOPIRAMATE 100 MG: 25 TABLET, FILM COATED ORAL at 20:27

## 2020-03-27 RX ADMIN — Medication 1 G: at 06:35

## 2020-03-27 RX ADMIN — TOPIRAMATE 100 MG: 25 TABLET, FILM COATED ORAL at 08:43

## 2020-03-27 RX ADMIN — Medication 10 ML: at 20:27

## 2020-03-27 RX ADMIN — DOXYCYCLINE 100 MG: 100 INJECTION, POWDER, LYOPHILIZED, FOR SOLUTION INTRAVENOUS at 06:35

## 2020-03-27 RX ADMIN — SPIRONOLACTONE 12.5 MG: 25 TABLET ORAL at 08:42

## 2020-03-27 ASSESSMENT — PAIN SCALES - GENERAL: PAINLEVEL_OUTOF10: 0

## 2020-03-27 NOTE — PROGRESS NOTES
Perfect serve message sent to Hospitalist.- \"Pt c/o severe dizziness, hard time standing up and walking. Pt takes meclizine at home,  requesting meclizine. Please advise.  Thanks\"

## 2020-03-27 NOTE — PROGRESS NOTES
[facility]    3/27/2020 11:50 AM        Name: Juma Sandoval . Admitted: 3/24/2020  Primary Care Provider: Maddison Blanco MD (Tel: [phone])      Subjective:  Patient presented to hospital with hx atrial fib, CAD/CABG, TIA, CKD, sCHF (45%), HTN, hypothyroidism, PAD/bypass. She presented to hospital with fever and cough, completed 7 day course antibiotic from PCP. Notes increased leg edema, weight gain, and weakness with fall at home. CXR with evidence of CHF, no focal consolidation, proBNP 6044. WBC elevated 18.3    Presently up in bedside chair. States she feels much better, cough improved. Hoping for possible DC home today. Nursing reports she assisted patient to bathroom and patient was extremely unsteady and nearly fell. Patient attributes this to her hx of vertigo which can hit her suddenly. Denies shortness of breath, HOB up to sleep last night.       Reviewed interval ancillary notes    Current Medications  meclizine (ANTIVERT) tablet 12.5 mg, TID PRN  benzonatate (TESSALON) capsule 100 mg, TID PRN  torsemide (DEMADEX) tablet 30 mg, Daily  allopurinol (ZYLOPRIM) tablet 300 mg, Daily  clopidogrel (PLAVIX) tablet 75 mg, Daily  famotidine (PEPCID) tablet 20 mg, BID PRN  rivaroxaban (XARELTO) tablet 15 mg, Daily  spironolactone (ALDACTONE) tablet 12.5 mg, Daily  topiramate (TOPAMAX) tablet 100 mg, BID  sodium chloride flush 0.9 % injection 10 mL, 2 times per day  sodium chloride flush 0.9 % injection 10 mL, PRN  acetaminophen (TYLENOL) tablet 650 mg, Q6H PRN    Or  acetaminophen (TYLENOL) suppository 650 mg, Q6H PRN  polyethylene glycol (GLYCOLAX) packet 17 g, Daily PRN  promethazine (PHENERGAN) tablet 12.5 mg, Q6H PRN    Or  ondansetron (ZOFRAN) injection 4 mg, Q6H PRN  nitroGLYCERIN (NITROSTAT) SL tablet 0.4 mg, Q5 Min PRN  doxycycline (VIBRAMYCIN) 100 mg in dextrose 5 % 100 mL IVPB, Q12H  levothyroxine (SYNTHROID) tablet 112 mcg, QAM AC  cefTRIAXone (ROCEPHIN) 1 g in sterile water 10 mL IV syringe, Q24H  metoprolol tartrate (LOPRESSOR) tablet 75 mg, BID      Objective:  BP (!) 152/69   Pulse 72   Temp 96.6 °F (35.9 °C) (Temporal)   Resp 18   Ht 5' 3\" (1.6 m)   Wt 125 lb 11.2 oz (57 kg)   SpO2 95%   BMI 22.27 kg/m²     Intake/Output Summary (Last 24 hours) at 3/27/2020 1150  Last data filed at 3/27/2020 0332  Gross per 24 hour   Intake 410 ml   Output 450 ml   Net -40 ml      Wt Readings from Last 3 Encounters:   03/26/20 125 lb 11.2 oz (57 kg)   03/16/20 141 lb 12.8 oz (64.3 kg)   03/05/20 138 lb (62.6 kg)     General:  Awake, alert, oriented in NAD  Skin:  Warm and dry. No unusual bruising or rash  Neck:  Supple. No JVD appreciated  Chest:  Normal effort. Clear to auscultation, no wheezes/rhonchi/rales  Cardiovascular:  RRR, normal S1/S2, no murmur/gallop/rub  Abdomen:  Soft, nontender, +bowel sounds  Extremities:  No edema  Neurological: No focal deficits  Psychological: Normal mood and affect      Labs and Tests:  CBC:   Recent Labs     03/25/20  0025 03/26/20  0431 03/27/20  0445   WBC 18.3* 13.9* 11.9*   HGB 13.5 13.1 13.4    339 361     BMP:    Recent Labs     03/25/20  0428 03/26/20  0431 03/27/20  0445    140 137   K 4.1 3.8 3.6   * 109 105   CO2 15* 17* 16*   BUN 30* 39* 42*   CREATININE 1.4* 1.5* 1.6*   GLUCOSE 149* 110* 114*     Hepatic:   Recent Labs     03/25/20  0025   AST 21   ALT 18   BILITOT 0.6   ALKPHOS 109     CXR 3/25/2020:  FINDINGS:   Cardiomegaly is noted.  Median sternotomy wires are seen.  A left-sided   intracardiac device is noted.  Pulmonary vascular congestion is noted. Dulcie Beer   is no focal consolidation, large pleural effusion, or definite pneumothorax. Echo 7/19/2019:  Summary  Left ventricle - normal size, thickness, reduced function with EF of 45%  LV wall motion - diffuse hypokinesis.   Mitral valve - thickened, annular

## 2020-03-27 NOTE — PROGRESS NOTES
Physical Therapy/Occupational Therapy  Genoveva Zheng     OT/PT orders appreciated. Patient currently pending results for COVID-19 rule out - per infection control guidelines, will hold OT/PT evaluation at this time.  Will follow up for assessment pending lab results and as time/schedule allows.       Thank you, Latoya Colon, [facility]

## 2020-03-28 VITALS
DIASTOLIC BLOOD PRESSURE: 59 MMHG | HEART RATE: 70 BPM | HEIGHT: 63 IN | OXYGEN SATURATION: 99 % | BODY MASS INDEX: 21.93 KG/M2 | WEIGHT: 123.8 LBS | SYSTOLIC BLOOD PRESSURE: 145 MMHG | TEMPERATURE: 97.5 F | RESPIRATION RATE: 16 BRPM

## 2020-03-28 LAB
ANION GAP SERPL CALCULATED.3IONS-SCNC: 16 MMOL/L (ref 3–16)
BUN BLDV-MCNC: 52 MG/DL (ref 7–20)
CALCIUM SERPL-MCNC: 9 MG/DL (ref 8.3–10.6)
CHLORIDE BLD-SCNC: 108 MMOL/L (ref 99–110)
CO2: 16 MMOL/L (ref 21–32)
CREAT SERPL-MCNC: 1.9 MG/DL (ref 0.6–1.2)
GFR AFRICAN AMERICAN: 31
GFR NON-AFRICAN AMERICAN: 25
GLUCOSE BLD-MCNC: 100 MG/DL (ref 70–99)
HCT VFR BLD CALC: 43.8 % (ref 36–48)
HEMOGLOBIN: 13.7 G/DL (ref 12–16)
MAGNESIUM: 2.2 MG/DL (ref 1.8–2.4)
MCH RBC QN AUTO: 25.4 PG (ref 26–34)
MCHC RBC AUTO-ENTMCNC: 31.3 G/DL (ref 31–36)
MCV RBC AUTO: 81.1 FL (ref 80–100)
PDW BLD-RTO: 23 % (ref 12.4–15.4)
PLATELET # BLD: 387 K/UL (ref 135–450)
PMV BLD AUTO: 8.8 FL (ref 5–10.5)
POTASSIUM SERPL-SCNC: 3.6 MMOL/L (ref 3.5–5.1)
PRO-BNP: 3073 PG/ML (ref 0–449)
RBC # BLD: 5.4 M/UL (ref 4–5.2)
SODIUM BLD-SCNC: 140 MMOL/L (ref 136–145)
WBC # BLD: 10.5 K/UL (ref 4–11)

## 2020-03-28 PROCEDURE — 36415 COLL VENOUS BLD VENIPUNCTURE: CPT

## 2020-03-28 PROCEDURE — 80048 BASIC METABOLIC PNL TOTAL CA: CPT

## 2020-03-28 PROCEDURE — 83880 ASSAY OF NATRIURETIC PEPTIDE: CPT

## 2020-03-28 PROCEDURE — 85027 COMPLETE CBC AUTOMATED: CPT

## 2020-03-28 PROCEDURE — 2580000003 HC RX 258: Performed by: HOSPITALIST

## 2020-03-28 PROCEDURE — 6370000000 HC RX 637 (ALT 250 FOR IP): Performed by: HOSPITALIST

## 2020-03-28 PROCEDURE — 6370000000 HC RX 637 (ALT 250 FOR IP): Performed by: INTERNAL MEDICINE

## 2020-03-28 PROCEDURE — 6370000000 HC RX 637 (ALT 250 FOR IP): Performed by: NURSE PRACTITIONER

## 2020-03-28 PROCEDURE — 83735 ASSAY OF MAGNESIUM: CPT

## 2020-03-28 RX ORDER — DOXYCYCLINE HYCLATE 100 MG
100 TABLET ORAL EVERY 12 HOURS SCHEDULED
Qty: 20 TABLET | Refills: 0 | Status: SHIPPED | OUTPATIENT
Start: 2020-03-28 | End: 2020-04-07

## 2020-03-28 RX ORDER — METOPROLOL TARTRATE 75 MG/1
75 TABLET, FILM COATED ORAL 2 TIMES DAILY
Qty: 60 TABLET | Refills: 3 | Status: SHIPPED | OUTPATIENT
Start: 2020-03-28 | End: 2020-04-30

## 2020-03-28 RX ORDER — TOPIRAMATE 25 MG/1
50 TABLET ORAL 2 TIMES DAILY
Status: DISCONTINUED | OUTPATIENT
Start: 2020-03-28 | End: 2020-03-28 | Stop reason: HOSPADM

## 2020-03-28 RX ORDER — TOPIRAMATE 50 MG/1
50 TABLET, FILM COATED ORAL 2 TIMES DAILY
Qty: 60 TABLET | Refills: 3 | Status: SHIPPED | OUTPATIENT
Start: 2020-03-28 | End: 2021-01-04

## 2020-03-28 RX ADMIN — DOXYCYCLINE HYCLATE 100 MG: 100 TABLET, COATED ORAL at 09:08

## 2020-03-28 RX ADMIN — MECLIZINE 12.5 MG: 12.5 TABLET ORAL at 09:08

## 2020-03-28 RX ADMIN — LEVOTHYROXINE SODIUM 112 MCG: 112 TABLET ORAL at 05:45

## 2020-03-28 RX ADMIN — SPIRONOLACTONE 12.5 MG: 25 TABLET ORAL at 09:08

## 2020-03-28 RX ADMIN — TOPIRAMATE 100 MG: 25 TABLET, FILM COATED ORAL at 09:07

## 2020-03-28 RX ADMIN — METOPROLOL TARTRATE 75 MG: 50 TABLET, FILM COATED ORAL at 09:08

## 2020-03-28 RX ADMIN — RIVAROXABAN 15 MG: 15 TABLET, FILM COATED ORAL at 09:08

## 2020-03-28 RX ADMIN — ALLOPURINOL 300 MG: 300 TABLET ORAL at 09:08

## 2020-03-28 RX ADMIN — TORSEMIDE 30 MG: 20 TABLET ORAL at 09:08

## 2020-03-28 RX ADMIN — CLOPIDOGREL 75 MG: 75 TABLET, FILM COATED ORAL at 09:08

## 2020-03-28 RX ADMIN — Medication 10 ML: at 09:09

## 2020-03-28 ASSESSMENT — PAIN SCALES - GENERAL
PAINLEVEL_OUTOF10: 0
PAINLEVEL_OUTOF10: 0

## 2020-03-28 NOTE — PROGRESS NOTES
Discharge reviewed with patient. She agrees to self isolate for 14 days, form signed and a copy placed on chart and given to patient. She will call for her follow up appointments. IV removed and dressing applied.

## 2020-03-28 NOTE — DISCHARGE SUMMARY
Hospital Medicine Discharge Summary    Patient ID: Celso Hinkle      Patient's PCP: Luca Back MD    Admit Date: 3/24/2020     Discharge Date:  3/28/2020    Admitting Physician: Lewis Landry MD     Discharge Physician: Dong Ordonez MD     Discharge Diagnoses: Vertigo, CHF exacerbation       Active Hospital Problems    Diagnosis    Chronic atrial fibrillation [I48.20]     Priority: Low    Generalized weakness [R53.1]    Heart failure ([address] [I50.9]    PAF [facility]oxysmal [facility] fibrillation) [facility]) [I48.0]    Dyslipidemia [E78.5]    Ischemic cardiomyopathy [I25.5]    Acute pulmonary edema [facility] [J81.0]    Atherosclerosis of native arteries of extremities with intermittent claudication, bilateral legs ([facility]) [I70.213]    Chronic renal failure, stage 3 (moderate) ([facility]) [N18.3]    PAD (peripheral artery disease) ([address] [I73.9]    Acute on chronic systolic (congestive) heart failure ([address] [I50.23]    Pacemaker [Z95.0]    Cerebrovascular accident (CVA) ([facility] [I63.9]    HTN (hypertension), benign [I10]    Non-rheumatic mitral regurgitation [I34.0]    Chronic gouty arthropathy [M1A.00X0]    Acquired hypothyroidism [E03.9]    Essential hypertension, benign [I10]    Mixed hyperlipidemia [E78.2]       The patient was seen and examined on day of discharge and this discharge summary is in conjunction with any daily progress note from day of discharge. Hospital Course:     Celso Hinkle is a 78 y.o. female who presented with   · Non productive Cough and fever X 2 weeks . Is on PO Abx @ Home given by her PCP . Completed 7 day course @ Home   · Reports inc weight gain of around 10 Pounds @ Home in last 3-4 weeks   · Inc leg edema   · Also Came to ED 2/2 weakness issues and fall at home w.o Trauma or LOC , 2/2 B/L LE weakness @ Home   · Lives at home w/ her    · No sick contacts   · Reports some Nausea .  No diarrhea   · No travel recently     Acute on chronic systolic heart failure. ProBNP [id number]. Patient is clinically dry    Leukocytosis. Resolved    Chronic atrial fibrillation. Controlled rate. On Xarelto. Hypertension. Reasonably controlled. Continue to monitor. CKD stage III. Slight bump in creatinine secondary diuresis. Nephrology consulted. Hold torsemide and spironolactone for now. Patient will follow-up with her nephrologist ASAP.    CAD/CABG. Denies chest pain. Troponin 0.01->0.02->0. 01. No further testing per cardiology. Cath 5/2019 with stable CAD. Continue Plavix, intolerant statins (myalgias). Unsteadiness. Patient attributes to vertigo. Resume meclizine. Patient discharged, instructed to self isolate her test results are available. She was instructed to hold her diuretics for now, she will call her nephrologist office today to see if she can resume diuretics. Physical Exam Performed:     BP (!) 145/59   Pulse 70   Temp 97.5 °F (36.4 °C) (Temporal)   Resp 16   Ht 5' 3\" (1.6 m)   Wt 123 lb 12.8 oz (56.2 kg)   SpO2 99%   BMI 21.93 kg/m²       General appearance:  No apparent distress, appears stated age and cooperative. HEENT:  Normal cephalic, atraumatic without obvious deformity. Pupils equal, round, and reactive to light. Extra ocular muscles intact. Conjunctivae/corneas clear. Neck: Supple, with full range of motion. No jugular venous distention. Trachea midline. Respiratory:  Normal respiratory effort. Clear to auscultation, bilaterally without Rales/Wheezes/Rhonchi. Cardiovascular:  Regular rate and rhythm with normal S1/S2 without murmurs, rubs or gallops. Abdomen: Soft, non-tender, non-distended with normal bowel sounds. Musculoskeletal:  No clubbing, cyanosis or edema bilaterally. Full range of motion without deformity. Skin: Skin color, texture, turgor normal.  No rashes or lesions. Neurologic:  Neurovascularly intact without any focal sensory/motor deficits.  Cranial nerves: II-XII intact, grossly right ear. Qty: 15 g, Refills: 1    Associated Diagnoses: Lesion of right external ear      clopidogrel (PLAVIX) 75 MG tablet Take 1 tablet by mouth daily  Qty: 90 tablet, Refills: 1      meclizine (ANTIVERT) 12.5 MG tablet Take 1 tablet by mouth 3 times daily as needed for Dizziness or Nausea  Qty: 90 tablet, Refills: 0      mupirocin (BACTROBAN) 2 % ointment Apply topically 2 times daily. Apply with Q tip to each nostril. Qty: 15 g, Refills: 0    Associated Diagnoses: Epistaxis, recurrent      rivaroxaban (XARELTO) 15 MG TABS tablet Take 1 tablet by mouth daily  Qty: 90 tablet, Refills: 3      famotidine (PEPCID) 20 MG tablet Take 1 tablet by mouth 2 times daily as needed (nausea)  Qty: 30 tablet, Refills: 0      tiZANidine (ZANAFLEX) 4 MG tablet Take 4 mg by mouth every 6 hours as needed      vitamin D (ERGOCALCIFEROL) 1.25 MG (80742 UT) CAPS capsule Take 50,000 Units by mouth once a week             Time Spent on discharge is more than 30 minutes in the examination, evaluation, counseling and review of medications and discharge plan.       Signed:    Electronically signed by Franki Bolden MD on 3/28/2020 at 3:50 PM

## 2020-03-28 NOTE — PLAN OF CARE
Problem: Falls - Risk of:  Goal: Will remain free from falls  Description: Will remain free from falls  3/28/2020 0915 by Faraz Cardozo RN  Outcome: Ongoing     Problem: Nutrition  Goal: Optimal nutrition therapy  3/28/2020 0915 by Faraz Cardozo RN  Outcome: Ongoing     Problem: OXYGENATION/RESPIRATORY FUNCTION  Goal: Patient will maintain patent airway  3/28/2020 0915 by Faraz Cardozo RN  Outcome: Ongoing     Problem: HEMODYNAMIC STATUS  Goal: Patient has stable vital signs and fluid balance  3/28/2020 0915 by Faraz Cardozo RN  Outcome: Ongoing   The care plan and education has been reviewed and mutually agreed upon with the patient.

## 2020-03-29 LAB
BLOOD CULTURE, ROUTINE: NORMAL
CULTURE, BLOOD 2: NORMAL

## 2020-03-30 ENCOUNTER — TELEPHONE (OUTPATIENT)
Dept: OTHER | Age: 80
End: 2020-03-30

## 2020-03-31 ENCOUNTER — TELEPHONE (OUTPATIENT)
Dept: FAMILY MEDICINE CLINIC | Age: 80
End: 2020-03-31

## 2020-03-31 NOTE — TELEPHONE ENCOUNTER
Pt would like an call back from the ma regarding the medication metoprolol 75 MG TABS and her BP, please advise [phone]

## 2020-04-04 LAB
REPORT: NORMAL
SARS-COV-2: NOT DETECTED
THIS TEST SENT TO: NORMAL

## 2020-04-06 RX ORDER — HYDROCODONE BITARTRATE AND ACETAMINOPHEN 5; 325 MG/1; MG/1
1 TABLET ORAL 4 TIMES DAILY PRN
Qty: 120 TABLET | Refills: 0 | Status: SHIPPED | OUTPATIENT
Start: 2020-04-06 | End: 2020-06-08 | Stop reason: SDUPTHER

## 2020-04-07 ENCOUNTER — NURSE ONLY (OUTPATIENT)
Dept: CARDIOLOGY CLINIC | Age: 80
End: 2020-04-07
Payer: MEDICARE

## 2020-04-07 PROCEDURE — 93296 REM INTERROG EVL PM/IDS: CPT | Performed by: INTERNAL MEDICINE

## 2020-04-07 PROCEDURE — 93294 REM INTERROG EVL PM/LDLS PM: CPT | Performed by: INTERNAL MEDICINE

## 2020-04-07 NOTE — LETTER
[id number] [employer] [phone]  [address] [phone]    Pacemaker/Defibrillator [facility]          04/07/20        Juma Sandoval  [address] [place] [id number]        Dear Juma Sandoval    This letter is to inform you that we received the transmission from your monitor at home that checks your implanted heart device. The next date your monitor will automatically transmit will be 7-21-20. If your report needs attention we will notify you. Your device and monitor are wireless and most transmit cellularly, but please periodically check your monitor is still plugged in to the electrical outlet. If you still use the telephone land line to send please ensure the connection to the phone bret is secure. This will help to ensure successful automatic transmissions in the future. Also, the monitor needs to be close to you while sleeping at night. Please be aware that the remote device transmission sites are periodically monitored only during regular business hours during which simultaneous in-office device clinics are being run. If your transmission requires attention, we will contact you as soon as possible. Thank you.             Fredi

## 2020-04-07 NOTE — PROGRESS NOTES
Latitude transmission shows normal sensing and pacing function. Noted atrial arrhythmias. Pt remains on Xarelto. See interrogation for more details.

## 2020-04-10 ENCOUNTER — TELEPHONE (OUTPATIENT)
Dept: CARDIOLOGY CLINIC | Age: 80
End: 2020-04-10

## 2020-04-10 ENCOUNTER — OFFICE VISIT (OUTPATIENT)
Dept: CARDIOLOGY CLINIC | Age: 80
End: 2020-04-10
Payer: MEDICARE

## 2020-04-10 ENCOUNTER — HOSPITAL ENCOUNTER (OUTPATIENT)
Age: 80
Discharge: HOME | End: 2020-04-10
Payer: MEDICARE

## 2020-04-10 VITALS
HEIGHT: 63 IN | OXYGEN SATURATION: 98 % | BODY MASS INDEX: 24.41 KG/M2 | HEART RATE: 69 BPM | SYSTOLIC BLOOD PRESSURE: 110 MMHG | DIASTOLIC BLOOD PRESSURE: 72 MMHG | WEIGHT: 137.8 LBS

## 2020-04-10 LAB
ANION GAP SERPL CALCULATED.3IONS-SCNC: 13 MMOL/L (ref 3–16)
BUN BLDV-MCNC: 28 MG/DL (ref 7–20)
CALCIUM SERPL-MCNC: 9.5 MG/DL (ref 8.3–10.6)
CHLORIDE BLD-SCNC: 112 MMOL/L (ref 99–110)
CO2: 19 MMOL/L (ref 21–32)
CREAT SERPL-MCNC: 1.5 MG/DL (ref 0.6–1.2)
GFR AFRICAN AMERICAN: 40
GFR NON-AFRICAN AMERICAN: 33
GLUCOSE BLD-MCNC: 99 MG/DL (ref 70–99)
POTASSIUM SERPL-SCNC: 5.1 MMOL/L (ref 3.5–5.1)
PRO-BNP: 8131 PG/ML (ref 0–449)
SODIUM BLD-SCNC: 144 MMOL/L (ref 136–145)

## 2020-04-10 PROCEDURE — 83880 ASSAY OF NATRIURETIC PEPTIDE: CPT

## 2020-04-10 PROCEDURE — 36415 COLL VENOUS BLD VENIPUNCTURE: CPT

## 2020-04-10 PROCEDURE — 80048 BASIC METABOLIC PNL TOTAL CA: CPT

## 2020-04-10 PROCEDURE — 99496 TRANSJ CARE MGMT HIGH F2F 7D: CPT | Performed by: CLINICAL NURSE SPECIALIST

## 2020-04-10 RX ORDER — TORSEMIDE 20 MG/1
20 TABLET ORAL DAILY
Qty: 30 TABLET | Refills: 0 | Status: SHIPPED | OUTPATIENT
Start: 2020-04-10 | End: 2020-04-22

## 2020-04-10 NOTE — TELEPHONE ENCOUNTER
Spoke with pt relayed test results per [employer].  Pt verbalized understanding.      ----- Message from TIFFANIE Merida sent at 4/10/2020  2:08 PM EDT -----  Her fluid level is up and make sure she understands to resume torsemide 20 mg once a day  Call us Monday with an update  thanks

## 2020-04-10 NOTE — PROGRESS NOTES
4/6/20 5/6/20 Yes Eunice Basurto MD   topiramate (TOPAMAX) 50 MG tablet Take 1 tablet by mouth 2 times daily 3/28/20  Yes Yeyo Ojeda MD   metoprolol 75 MG TABS Take 75 mg by mouth 2 times daily  Patient taking differently: Take 75 mg by mouth 2 times daily 100 mg twice daily 3/28/20  Yes Yeyo Ojeda MD   levothyroxine (SYNTHROID) 112 MCG tablet TAKE 1 TABLET DAILY 3/10/20  Yes Eunice Basurto MD   allopurinol (ZYLOPRIM) 300 MG tablet Take 1 tablet by mouth daily 2/21/20  Yes Constance Tran APRN - CNP   rivaroxaban (XARELTO) 15 MG TABS tablet Take 1 tablet by mouth daily 2/10/20  Yes TIFFANIE Dodson - NP   tiZANidine (ZANAFLEX) 4 MG tablet Take 4 mg by mouth every 6 hours as needed   Yes Historical Provider, MD   vitamin D (ERGOCALCIFEROL) 1.25 MG (07435 UT) CAPS capsule Take 50,000 Units by mouth once a week   Yes Historical Provider, MD   clopidogrel (PLAVIX) 75 MG tablet Take 1 tablet by mouth daily 9/11/19  Yes Eunice Basurto MD   meclizine (ANTIVERT) 12.5 MG tablet Take 1 tablet by mouth 3 times daily as needed for Dizziness or Nausea 7/23/19  Yes Eunice Basurto MD   famotidine (PEPCID) 20 MG tablet Take 1 tablet by mouth 2 times daily as needed (nausea)  Patient not taking: Reported on 4/10/2020 2/4/20   Eunice Basurto MD        Allergies:  Aspirin; Ativan [lorazepam]; Diltiazem; Sulfa antibiotics; Dabigatran; Lipitor [atorvastatin]; Mysoline [primidone]; Nsaids; and Other     Review of Systems:   · Constitutional: there has been no unanticipated weight loss. There's been no change in energy level, sleep pattern, or activity level. · Eyes: No visual changes or diplopia. No scleral icterus. · ENT: No Headaches, hearing loss or vertigo. No mouth sores or sore throat. · Cardiovascular: Reviewed in HPI  · Respiratory: No cough or wheezing, no sputum production. No hematemesis. · Gastrointestinal: No abdominal pain, appetite loss, blood in stools.  No change in bowel or bladder habits. States she feels bloated   · Genitourinary: No dysuria, trouble voiding, or hematuria. · Musculoskeletal:  No gait disturbance, weakness or joint complaints. · Integumentary: No rash or pruritis. · Neurological: No headache, diplopia, change in muscle strength, numbness or tingling. No change in gait, balance, coordination, mood, affect, memory, mentation, behavior. · Psychiatric: No anxiety, no depression. · Endocrine: No malaise, fatigue or temperature intolerance. No excessive thirst, fluid intake, or urination. No tremor. · Hematologic/Lymphatic: No abnormal bruising or bleeding, blood clots or swollen lymph nodes. · Allergic/Immunologic: No nasal congestion or hives. Physical Examination:    Vitals:    04/10/20 1002   BP: 110/72   Site: Left Upper Arm   Position: Sitting   Cuff Size: Medium Adult   Pulse: 69   SpO2: 98%   Weight: 137 lb 12.8 oz (62.5 kg)   Height: 5' 3\" (1.6 m)        Constitutional and General Appearance: Warm and dry, no apparent distress, normal coloration  HEENT:  Normocephalic, atraumatic  Respiratory:  · Normal excursion and expansion without use of accessory muscles  · Resp Auscultation: Normal breath sounds without dullness  Cardiovascular:  · The apical impulses not displaced  · Heart tones are crisp and normal  · JVP normal H2O  · Regular rate and irregular rhythm  · Peripheral pulses are symmetrical and full  · There is no clubbing, cyanosis of the extremities.   · Trace edema bilateral ankles  · Pedal Pulses: 2+ and equal   Abdomen:  · No masses or tenderness  · Liver/Spleen: No Abnormalities Noted  Neurological/Psychiatric:  · Alert and oriented in all spheres  · Moves all extremities well  · Exhibits normal gait balance and coordination  · No abnormalities of mood, affect, memory, mentation, or behavior are noted    Lab Data:    CBC:   Lab Results   Component Value Date    WBC 10.5 03/28/2020    WBC 11.9 03/27/2020    WBC 13.9 03/26/2020    RBC Cath Dx:Stable CAD, no apparent culprit for NSTEMI  Intervention:  None  Med Rec:        Continue aggressive med tx                          Continue plavix, no asa due to allergy. statin added. LDL 75   8/7/18  The PCI of complex proximal RCA chronic total occlusion was unsuccessful (J- score 3). Underwent successful Angioplasty of high-grade proximal RCA lesion proximal to the . RECOMMENDATIONS:  Attempt on this complex long  was unsuccessful. Lack   Of retrograde collaterals along with a very ambiguous cap as well as a large RV marginal branch and bridging collateral coming off at the cap makes it a  challenging repeat attempt. If she continues to have angina, it is recommended to proceed with the single-vessel SVG to the RCA. Echo: 2/17/16 ([facility])  Conclusions: Normal LV size and systolic function Mild to moderate mitral  regurgitation Mild tricuspid regurgitation  Findings:   Left Atrium: Mild to moderate enlargement of the left atrium. Left Ventricle: Upper limits of normal left ventricle size. No left ventricular  hypertrophy. Normal left ventricular systolic function. The ejection fraction   Is visually estimated to be 55 %. Right [facility]: Normal right atrial size. RightVentricle: Normal right ventricle size. Normal right ventricular function. Aortic Valve: Tri-leaflet aortic valve. Mild aortic cusp calcification. No  aortic valve stenosis. Mild (1+) aortic valve regurgitation. Aorta: No dilatation of the aortic root. IVC/PA: Normal IVC size and normal respiratory  collapse consistent with normal right atrial pressure. Mitral Valve: Mild mitral valve leaflet calcification. Mild to moderate (2+) mitral valve  regurgitation. No mitral valve stenosis. Tricuspid Valve: Mild (1+) tricuspid  regurgitation. The pulmonary artery pressure is normal.   Pulmonic Valve: Mild  pulmonic regurgitation. Pericardium: Normal pericardium with no significant  pericardial effusion.     Assessment: 1. Chronic systolic heart failure (HCC) on bb, no ace/arb due to dizziness, no aldactone due to kidney function per nephrology   2. Shortness of breath    3. Chronic atrial fibrillation on xarelto   4. Coronary artery disease involving autologous artery coronary bypass graft without angina pectoris        Plan:   1. Start taking 20mg torsemide daily  2. Will check blood work today  3. Follow up with Dr. Michael Harris on 4/27  4. RTO in 3 weeks with ECHO at [address]  5. Continue taking all current medication      I appreciate the opportunity of cooperating in the care of this individual.    WINSTON Hoffman, 4/10/2020, 10:57 AM    QUALITY MEASURES  1. Tobacco Cessation Counseling: NA  2. Retake of BP if >140/90:   NA  3. Documentation to PCP/referring for new patient:  Sent to PCP at close of office visit  4. CAD patient on anti-platelet: Yes  5. CAD patient on STATIN therapy:  No did not tolerate  6.  Patient with CHF and aFib on anticoagulation:  Yes

## 2020-04-14 ENCOUNTER — TELEPHONE (OUTPATIENT)
Dept: CARDIOLOGY CLINIC | Age: 80
End: 2020-04-14

## 2020-04-20 RX ORDER — CLOPIDOGREL BISULFATE 75 MG/1
75 TABLET ORAL DAILY
Qty: 90 TABLET | Refills: 2 | Status: SHIPPED | OUTPATIENT
Start: 2020-04-20 | End: 2020-10-06 | Stop reason: SDUPTHER

## 2020-04-21 ENCOUNTER — OFFICE VISIT (OUTPATIENT)
Dept: CARDIOLOGY CLINIC | Age: 80
End: 2020-04-21
Payer: MEDICARE

## 2020-04-21 ENCOUNTER — HOSPITAL ENCOUNTER (OUTPATIENT)
Age: 80
Discharge: HOME | End: 2020-04-21
Payer: MEDICARE

## 2020-04-21 ENCOUNTER — NURSE ONLY (OUTPATIENT)
Dept: CARDIOLOGY CLINIC | Age: 80
End: 2020-04-21
Payer: MEDICARE

## 2020-04-21 VITALS
WEIGHT: 134 LBS | RESPIRATION RATE: 18 BRPM | BODY MASS INDEX: 23.74 KG/M2 | HEIGHT: 63 IN | HEART RATE: 69 BPM | DIASTOLIC BLOOD PRESSURE: 72 MMHG | SYSTOLIC BLOOD PRESSURE: 137 MMHG

## 2020-04-21 LAB
ALBUMIN SERPL-MCNC: 3.9 G/DL (ref 3.4–5)
ANION GAP SERPL CALCULATED.3IONS-SCNC: 10 MMOL/L (ref 3–16)
BUN BLDV-MCNC: 39 MG/DL (ref 7–20)
CALCIUM SERPL-MCNC: 10 MG/DL (ref 8.3–10.6)
CHLORIDE BLD-SCNC: 103 MMOL/L (ref 99–110)
CO2: 27 MMOL/L (ref 21–32)
CREAT SERPL-MCNC: 1.9 MG/DL (ref 0.6–1.2)
CREATININE URINE: 150.4 MG/DL (ref 28–259)
GFR AFRICAN AMERICAN: 31
GFR NON-AFRICAN AMERICAN: 25
GLUCOSE BLD-MCNC: 100 MG/DL (ref 70–99)
HCT VFR BLD CALC: 43.6 % (ref 36–48)
HEMOGLOBIN: 13.7 G/DL (ref 12–16)
MCH RBC QN AUTO: 26 PG (ref 26–34)
MCHC RBC AUTO-ENTMCNC: 31.4 G/DL (ref 31–36)
MCV RBC AUTO: 83 FL (ref 80–100)
MICROALBUMIN UR-MCNC: 10.5 MG/DL
MICROALBUMIN/CREAT UR-RTO: 69.8 MG/G (ref 0–30)
PARATHYROID HORMONE INTACT: 44 PG/ML (ref 14–72)
PDW BLD-RTO: 21.7 % (ref 12.4–15.4)
PHOSPHORUS: 3.5 MG/DL (ref 2.5–4.9)
PLATELET # BLD: 394 K/UL (ref 135–450)
PMV BLD AUTO: 9 FL (ref 5–10.5)
POTASSIUM SERPL-SCNC: 4.7 MMOL/L (ref 3.5–5.1)
PRO-BNP: 2255 PG/ML (ref 0–449)
RBC # BLD: 5.26 M/UL (ref 4–5.2)
SODIUM BLD-SCNC: 140 MMOL/L (ref 136–145)
WBC # BLD: 10.8 K/UL (ref 4–11)

## 2020-04-21 PROCEDURE — 4040F PNEUMOC VAC/ADMIN/RCVD: CPT | Performed by: INTERNAL MEDICINE

## 2020-04-21 PROCEDURE — 93000 ELECTROCARDIOGRAM COMPLETE: CPT | Performed by: INTERNAL MEDICINE

## 2020-04-21 PROCEDURE — 36415 COLL VENOUS BLD VENIPUNCTURE: CPT

## 2020-04-21 PROCEDURE — 80069 RENAL FUNCTION PANEL: CPT

## 2020-04-21 PROCEDURE — 82043 UR ALBUMIN QUANTITATIVE: CPT

## 2020-04-21 PROCEDURE — 1090F PRES/ABSN URINE INCON ASSESS: CPT | Performed by: INTERNAL MEDICINE

## 2020-04-21 PROCEDURE — G8420 CALC BMI NORM PARAMETERS: HCPCS | Performed by: INTERNAL MEDICINE

## 2020-04-21 PROCEDURE — 93280 PM DEVICE PROGR EVAL DUAL: CPT | Performed by: INTERNAL MEDICINE

## 2020-04-21 PROCEDURE — 85027 COMPLETE CBC AUTOMATED: CPT

## 2020-04-21 PROCEDURE — G8427 DOCREV CUR MEDS BY ELIG CLIN: HCPCS | Performed by: INTERNAL MEDICINE

## 2020-04-21 PROCEDURE — G8399 PT W/DXA RESULTS DOCUMENT: HCPCS | Performed by: INTERNAL MEDICINE

## 2020-04-21 PROCEDURE — 1036F TOBACCO NON-USER: CPT | Performed by: INTERNAL MEDICINE

## 2020-04-21 PROCEDURE — 1123F ACP DISCUSS/DSCN MKR DOCD: CPT | Performed by: INTERNAL MEDICINE

## 2020-04-21 PROCEDURE — 99214 OFFICE O/P EST MOD 30 MIN: CPT | Performed by: INTERNAL MEDICINE

## 2020-04-21 PROCEDURE — 83970 ASSAY OF PARATHORMONE: CPT

## 2020-04-21 PROCEDURE — 83880 ASSAY OF NATRIURETIC PEPTIDE: CPT

## 2020-04-21 PROCEDURE — 1111F DSCHRG MED/CURRENT MED MERGE: CPT | Performed by: INTERNAL MEDICINE

## 2020-04-21 PROCEDURE — 82570 ASSAY OF URINE CREATININE: CPT

## 2020-04-21 NOTE — PROGRESS NOTES
Patient comes in for programming evaluation for her pacemaker. All sensing and pacing parameters are within normal range. Battery has 9 month left until it reaches ERT. AP 76%.  5%. AT/AF 1 % burden. Patient remains on Xarelto. 5 NSVT episodes noted. Last on 3/29/2020, longest 9 seconds. Patient remains on metoprolol. No changes need to be made at this time. Please see interrogation for more detail. Patient will see Dr. Emir Ramirez today and follow up in 3 months in office or remotely.

## 2020-04-21 NOTE — TELEPHONE ENCOUNTER
Patient requesting a medication refill. Medication: allopurinol (ZYLOPRIM) 100 MG tablet  (requesting 100mg instead of 300 mg , please make correction patient takes 400 a day. Patient has two prescriptions.     2. allopurinol (ZYLOPRIM) 300 MG tablet           Pharmacy: [address] [place] [address] [phone]   Last office visit: 4/9/2020  Next office visit: 6/3/2020

## 2020-04-21 NOTE — PROGRESS NOTES
indigestion    Aka Pradaxa    Lipitor [Atorvastatin] Other (See Comments)     Muscle pains    Mysoline [Primidone]     Nsaids     Other Other (See Comments)     Nitroglycerin patches causes severe headaches       Social History:  Reviewed. reports that she quit smoking about 33 years ago. Her smoking use included cigarettes. She has a 28.00 pack-year smoking history. She has never used smokeless tobacco. She reports previous alcohol use. She reports that she does not use drugs. Family History:  Reviewed. family history includes Cancer in her maternal grandmother, paternal grandmother, and sister; Diabetes in her brother and maternal uncle; Heart Disease in her brother, maternal aunt, and sister; Hypertension in her brother and paternal aunt; Stroke in her maternal aunt. Review of System:  All other systems reviewed Pertinent negatives and positives are:     General: - fever, - chills + Fatigue   Ophthalmic ROS: - eye pain or loss of vision  ENT - headaches, - sore throat   Respiratory: - cough, - sputum + shortness of breath with activity  Cardiovascular: Reviewed in HPI  Gastrointestinal: - abdominal pain, - diarrhea, - N/V   Hematology: - bleeding, - blood clots, - bruising - jaundice     Genito-Urinary:  - Dysuria or incontinence    Musculoskeletal: - Joint swelling, - muscle pain    Neurological: - confusion, - dizziness, - headaches   Psychiatric: - anxiety, - depression   Dermatological: - rash      Physical Examination:     Vitals:    04/21/20 1350   BP: 137/72   Pulse: 69   Resp:        Wt Readings from Last 3 Encounters:   04/21/20 134 lb (60.8 kg)   04/10/20 137 lb 12.8 oz (62.5 kg)   03/27/20 123 lb 12.8 oz (56.2 kg)     · Constitutional: Oriented. No distress. · Head: Normocephalic and atraumatic. · Mouth/Throat: Oropharynx is clear and moist.   · Eyes: Conjunctivae normal. EOM are normal.   · Neck: Neck supple. No JVD present. · Cardiovascular: Normal rate, regular rhythm, S1&S2. [address] [place] attestation: This note was scribed in the presence of Geovanna Cano MD by Julieta Morocho RN  Physician Attestation: I, Dr. Geovanna Cano, confirm that the scribe's documentation has been prepared under my direction and personally reviewed by me in its entirety. I also confirm that the note above accurately reflects all work, treatment, procedures, and medical decision making performed by me.

## 2020-04-22 ENCOUNTER — TELEPHONE (OUTPATIENT)
Dept: CARDIOLOGY CLINIC | Age: 80
End: 2020-04-22

## 2020-04-22 RX ORDER — TORSEMIDE 20 MG/1
TABLET ORAL
Qty: 30 TABLET | Refills: 0 | Status: SHIPPED | OUTPATIENT
Start: 2020-04-22 | End: 2020-07-07 | Stop reason: SDUPTHER

## 2020-04-22 NOTE — TELEPHONE ENCOUNTER
Medication Refill    Medication needing refilled: Torsemide   Doseage of the medication:  10 mg  How are you taking this medication (QD, BID, TID, QID, PRN):    30 or 90 day supply called in:    Which Pharmacy are we sending the medication to?:[facility] [address] [place] [address] [phone] - F [phone]      Medication Question/Concern    What is the name of the medication you need to speak with someone about? Doseage of the medication:    How are you taking this medication:    What issues/concerns are you having with this medication:      Medication Samples    Medication:    Doseage of the medication:    How are you taking this medication (QD, BID, TID, QID, PRN):     in the office or Mail to your home?

## 2020-04-23 RX ORDER — ALLOPURINOL 100 MG/1
100 TABLET ORAL DAILY
Qty: 90 TABLET | Refills: 1 | Status: SHIPPED | OUTPATIENT
Start: 2020-04-23 | End: 2020-12-04 | Stop reason: SDUPTHER

## 2020-04-27 ENCOUNTER — TELEPHONE (OUTPATIENT)
Dept: VASCULAR SURGERY | Age: 80
End: 2020-04-27

## 2020-04-27 ENCOUNTER — TELEPHONE (OUTPATIENT)
Dept: FAMILY MEDICINE CLINIC | Age: 80
End: 2020-04-27

## 2020-04-27 NOTE — TELEPHONE ENCOUNTER
Laura advised  Dr David Carvajal recommended an MRI Lumbar. She is going to check with cardiologist to see if she can have that with her pacemaker.

## 2020-04-27 NOTE — TELEPHONE ENCOUNTER
Sid Blue pts daughter called and stated that [facility] needs to verify rx for all[urinol. Pharmacy keeps sending 300mg and not 100 so pt is just requesting 100mg.  Please call [facility] at [phone]    Pt has been out of 100mg for 2 weeks bs Felicia Gomez keeps sending wrong dose

## 2020-04-30 ENCOUNTER — PROCEDURE VISIT (OUTPATIENT)
Dept: CARDIOLOGY CLINIC | Age: 80
End: 2020-04-30
Payer: MEDICARE

## 2020-04-30 ENCOUNTER — OFFICE VISIT (OUTPATIENT)
Dept: CARDIOLOGY CLINIC | Age: 80
End: 2020-04-30
Payer: MEDICARE

## 2020-04-30 ENCOUNTER — TELEPHONE (OUTPATIENT)
Dept: CARDIOLOGY CLINIC | Age: 80
End: 2020-04-30

## 2020-04-30 ENCOUNTER — HOSPITAL ENCOUNTER (OUTPATIENT)
Age: 80
Discharge: HOME | End: 2020-04-30
Payer: MEDICARE

## 2020-04-30 VITALS
WEIGHT: 133.4 LBS | TEMPERATURE: 97.2 F | SYSTOLIC BLOOD PRESSURE: 112 MMHG | OXYGEN SATURATION: 97 % | DIASTOLIC BLOOD PRESSURE: 60 MMHG | BODY MASS INDEX: 23.63 KG/M2 | HEART RATE: 70 BPM

## 2020-04-30 PROBLEM — D50.9 IRON DEFICIENCY ANEMIA: Status: ACTIVE | Noted: 2020-04-30

## 2020-04-30 PROBLEM — D64.9 ANEMIA: Status: ACTIVE | Noted: 2020-04-30

## 2020-04-30 LAB
ALBUMIN SERPL-MCNC: 4.2 G/DL (ref 3.4–5)
ANION GAP SERPL CALCULATED.3IONS-SCNC: 14 MMOL/L (ref 3–16)
BUN BLDV-MCNC: 45 MG/DL (ref 7–20)
CALCIUM SERPL-MCNC: 9.9 MG/DL (ref 8.3–10.6)
CHLORIDE BLD-SCNC: 103 MMOL/L (ref 99–110)
CO2: 26 MMOL/L (ref 21–32)
CREAT SERPL-MCNC: 1.9 MG/DL (ref 0.6–1.2)
CREATININE URINE: 126.2 MG/DL (ref 28–259)
GFR AFRICAN AMERICAN: 31
GFR NON-AFRICAN AMERICAN: 25
GLUCOSE BLD-MCNC: 84 MG/DL (ref 70–99)
HCT VFR BLD CALC: 46 % (ref 36–48)
HEMOGLOBIN: 14 G/DL (ref 12–16)
MCH RBC QN AUTO: 25.9 PG (ref 26–34)
MCHC RBC AUTO-ENTMCNC: 30.6 G/DL (ref 31–36)
MCV RBC AUTO: 84.8 FL (ref 80–100)
PARATHYROID HORMONE INTACT: 60.4 PG/ML (ref 14–72)
PDW BLD-RTO: 21.6 % (ref 12.4–15.4)
PHOSPHORUS: 4.2 MG/DL (ref 2.5–4.9)
PLATELET # BLD: 393 K/UL (ref 135–450)
PMV BLD AUTO: 8.5 FL (ref 5–10.5)
POTASSIUM SERPL-SCNC: 4.6 MMOL/L (ref 3.5–5.1)
PROTEIN PROTEIN: 24 MG/DL
RBC # BLD: 5.42 M/UL (ref 4–5.2)
SODIUM BLD-SCNC: 143 MMOL/L (ref 136–145)
VITAMIN D 25-HYDROXY: 62.3 NG/ML
WBC # BLD: 12 K/UL (ref 4–11)

## 2020-04-30 PROCEDURE — 93308 TTE F-UP OR LMTD: CPT | Performed by: INTERNAL MEDICINE

## 2020-04-30 PROCEDURE — 83970 ASSAY OF PARATHORMONE: CPT

## 2020-04-30 PROCEDURE — 82306 VITAMIN D 25 HYDROXY: CPT

## 2020-04-30 PROCEDURE — 80069 RENAL FUNCTION PANEL: CPT

## 2020-04-30 PROCEDURE — 1123F ACP DISCUSS/DSCN MKR DOCD: CPT | Performed by: CLINICAL NURSE SPECIALIST

## 2020-04-30 PROCEDURE — 4040F PNEUMOC VAC/ADMIN/RCVD: CPT | Performed by: CLINICAL NURSE SPECIALIST

## 2020-04-30 PROCEDURE — 1036F TOBACCO NON-USER: CPT | Performed by: CLINICAL NURSE SPECIALIST

## 2020-04-30 PROCEDURE — 99214 OFFICE O/P EST MOD 30 MIN: CPT | Performed by: CLINICAL NURSE SPECIALIST

## 2020-04-30 PROCEDURE — G8427 DOCREV CUR MEDS BY ELIG CLIN: HCPCS | Performed by: CLINICAL NURSE SPECIALIST

## 2020-04-30 PROCEDURE — 93321 DOPPLER ECHO F-UP/LMTD STD: CPT | Performed by: INTERNAL MEDICINE

## 2020-04-30 PROCEDURE — G8420 CALC BMI NORM PARAMETERS: HCPCS | Performed by: CLINICAL NURSE SPECIALIST

## 2020-04-30 PROCEDURE — 82570 ASSAY OF URINE CREATININE: CPT

## 2020-04-30 PROCEDURE — 1090F PRES/ABSN URINE INCON ASSESS: CPT | Performed by: CLINICAL NURSE SPECIALIST

## 2020-04-30 PROCEDURE — 85027 COMPLETE CBC AUTOMATED: CPT

## 2020-04-30 PROCEDURE — 84156 ASSAY OF PROTEIN URINE: CPT

## 2020-04-30 PROCEDURE — G8399 PT W/DXA RESULTS DOCUMENT: HCPCS | Performed by: CLINICAL NURSE SPECIALIST

## 2020-04-30 RX ORDER — SODIUM CHLORIDE 9 MG/ML
INJECTION, SOLUTION INTRAVENOUS CONTINUOUS
Status: CANCELLED | OUTPATIENT
Start: 2020-04-30

## 2020-04-30 RX ORDER — METOPROLOL SUCCINATE 100 MG/1
100 TABLET, EXTENDED RELEASE ORAL 2 TIMES DAILY
Qty: 60 TABLET | Refills: 0
Start: 2020-04-30 | End: 2020-07-20 | Stop reason: SDUPTHER

## 2020-04-30 RX ORDER — SODIUM CHLORIDE 0.9 % (FLUSH) 0.9 %
10 SYRINGE (ML) INJECTION PRN
Status: CANCELLED | OUTPATIENT
Start: 2020-04-30

## 2020-04-30 NOTE — TELEPHONE ENCOUNTER
Notified pt of the message below. She verbalized understanding and agreed.        Notes recorded by Martie Mcburney, APRN - CNS on 4/30/2020 at 1:15 PM EDT  Echo still with LVEF of 45%  I have sent over an order for iron to the infusion center  They should call her once insurance approves and they can schedule her  thanks

## 2020-04-30 NOTE — PROGRESS NOTES
DAILY 3/10/20   Ke Blanco MD   allopurinol (ZYLOPRIM) 300 MG tablet Take 1 tablet by mouth daily 2/21/20   TIFFANIE Whalen - CNP   rivaroxaban (XARELTO) 15 MG TABS tablet Take 1 tablet by mouth daily 2/10/20   TIFFANIE Dillard - NP   tiZANidine (ZANAFLEX) 4 MG tablet Take 4 mg by mouth every 6 hours as needed    Historical Provider, MD   vitamin D (ERGOCALCIFEROL) 1.25 MG (77427 UT) CAPS capsule Take 50,000 Units by mouth once a week    Historical Provider, MD   meclizine (ANTIVERT) 12.5 MG tablet Take 1 tablet by mouth 3 times daily as needed for Dizziness or Nausea 7/23/19   Ke Blanco MD        Allergies:  Aspirin; Ativan [lorazepam]; Diltiazem; Sulfa antibiotics; Dabigatran; Lipitor [atorvastatin]; Mysoline [primidone]; Nsaids; and Other     Review of Systems:   · Constitutional: there has been no unanticipated weight loss. There's been no change in energy level, sleep pattern, or activity level. · Eyes: No visual changes or diplopia. No scleral icterus. · ENT: No Headaches, hearing loss or vertigo. No mouth sores or sore throat. · Cardiovascular: Reviewed in HPI  · Respiratory: No cough or wheezing, no sputum production. No hematemesis. · Gastrointestinal: No abdominal pain, appetite loss, blood in stools. No change in bowel or bladder habits. States she feels bloated   · Genitourinary: No dysuria, trouble voiding, or hematuria. · Musculoskeletal:  No gait disturbance, weakness or joint complaints. · Integumentary: No rash or pruritis. · Neurological: No headache, diplopia, change in muscle strength, numbness or tingling. No change in gait, balance, coordination, mood, affect, memory, mentation, behavior. · Psychiatric: No anxiety, no depression. · Endocrine: No malaise, fatigue or temperature intolerance. No excessive thirst, fluid intake, or urination. No tremor.   · Hematologic/Lymphatic: No abnormal bruising or bleeding, blood clots or swollen lymph

## 2020-05-08 ENCOUNTER — HOSPITAL ENCOUNTER (OUTPATIENT)
Dept: ONCOLOGY | Age: 80
Setting detail: INFUSION SERIES
Discharge: HOME | End: 2020-05-08
Payer: MEDICARE

## 2020-05-08 VITALS
DIASTOLIC BLOOD PRESSURE: 58 MMHG | TEMPERATURE: 97 F | RESPIRATION RATE: 16 BRPM | SYSTOLIC BLOOD PRESSURE: 114 MMHG | HEART RATE: 70 BPM

## 2020-05-08 DIAGNOSIS — D50.9 IRON DEFICIENCY ANEMIA, UNSPECIFIED IRON DEFICIENCY ANEMIA TYPE: Primary | ICD-10-CM

## 2020-05-08 DIAGNOSIS — D64.9 ANEMIA, UNSPECIFIED TYPE: ICD-10-CM

## 2020-05-08 PROCEDURE — 96365 THER/PROPH/DIAG IV INF INIT: CPT

## 2020-05-08 PROCEDURE — 2580000003 HC RX 258: Performed by: CLINICAL NURSE SPECIALIST

## 2020-05-08 PROCEDURE — 6360000002 HC RX W HCPCS: Performed by: CLINICAL NURSE SPECIALIST

## 2020-05-08 RX ORDER — SODIUM CHLORIDE 0.9 % (FLUSH) 0.9 %
10 SYRINGE (ML) INJECTION PRN
Status: CANCELLED | OUTPATIENT
Start: 2020-05-15

## 2020-05-08 RX ORDER — SODIUM CHLORIDE 0.9 % (FLUSH) 0.9 %
10 SYRINGE (ML) INJECTION PRN
Status: DISCONTINUED | OUTPATIENT
Start: 2020-05-08 | End: 2020-05-09 | Stop reason: HOSPADM

## 2020-05-08 RX ORDER — SODIUM CHLORIDE 9 MG/ML
INJECTION, SOLUTION INTRAVENOUS CONTINUOUS
Status: ACTIVE | OUTPATIENT
Start: 2020-05-08 | End: 2020-05-08

## 2020-05-08 RX ORDER — SODIUM CHLORIDE 9 MG/ML
INJECTION, SOLUTION INTRAVENOUS CONTINUOUS
Status: CANCELLED | OUTPATIENT
Start: 2020-05-15

## 2020-05-08 RX ADMIN — SODIUM CHLORIDE 200 MG: 9 INJECTION, SOLUTION INTRAVENOUS at 11:38

## 2020-05-08 RX ADMIN — SODIUM CHLORIDE: 9 INJECTION, SOLUTION INTRAVENOUS at 11:35

## 2020-05-08 RX ADMIN — Medication 10 ML: at 11:35

## 2020-05-08 NOTE — PROGRESS NOTES
Patient ambulatory to department for first of four doses of venofer. Tolerated venofer infusion well with no adverse rx noted. Given avs with information about venofer. Verbally told about most common possible side effects. To return next week for next infusion.

## 2020-05-09 ENCOUNTER — HOSPITAL ENCOUNTER (EMERGENCY)
Age: 80
Discharge: HOME | End: 2020-05-09
Attending: EMERGENCY MEDICINE
Payer: MEDICARE

## 2020-05-09 ENCOUNTER — APPOINTMENT (OUTPATIENT)
Dept: GENERAL RADIOLOGY | Age: 80
End: 2020-05-09
Payer: MEDICARE

## 2020-05-09 VITALS
OXYGEN SATURATION: 98 % | DIASTOLIC BLOOD PRESSURE: 74 MMHG | HEART RATE: 71 BPM | TEMPERATURE: 98.5 F | SYSTOLIC BLOOD PRESSURE: 106 MMHG | RESPIRATION RATE: 16 BRPM

## 2020-05-09 PROCEDURE — 99283 EMERGENCY DEPT VISIT LOW MDM: CPT

## 2020-05-09 PROCEDURE — 73130 X-RAY EXAM OF HAND: CPT

## 2020-05-09 ASSESSMENT — PAIN SCALES - GENERAL: PAINLEVEL_OUTOF10: 10

## 2020-05-09 NOTE — ED PROVIDER NOTES
[facility]        Pt Name: Paulino Choudhury  MRN: [id number]  [place] 1940  Date of evaluation: 5/9/2020  Provider: Lalita Roy MD  PCP: Hansa Parker MD    This patient was seen and evaluated by the attending physician Lalita Roy MD.      [address] [place] [address]       Chief Complaint   Patient presents with    Hand Injury     Patient in with complaints of a hand injury that happened two days ago. States the trash can lid his her hand. She is on blood thinners. lac to hand        HISTORY OF PRESENT ILLNESS   (Location/Symptom, Timing/Onset, Context/Setting, Quality, Duration, Modifying Factors, Severity)  Note limiting factors. Paulino Choudhury is a 78 y.o. female p/w cc of right hand pain. She dropped a trash can lid on her hand 2d ago, this morning awoke with swelling and pain to dorsum of right hand. No tingling numbness weakness or paresthesia  No drainage. Hx of A-fib, on Xarelto, Plavix. Nursing Notes were all reviewed and agreed with or any disagreements were addressed  in the HPI. REVIEW OF SYSTEMS    (2-9 systems for level 4, 10 or more for level 5)     Review of Systems    Positives and Pertinent negatives as per HPI. Except as noted abovein the ROS, all other systems were reviewed and negative.        PAST MEDICAL HISTORY     Past Medical History:   Diagnosis Date    Allergic rhinitis, cause unspecified     Arthritis     Atrial fibrillation ([facility] [address]     Bronchopneumonia     CAD (coronary artery disease)     stent:  post cataract surgery (CABG)    Cerebral artery occlusion with cerebral infarction (HCC)     TIA    Chronic gouty arthropathy     Chronic kidney disease     Congestive heart failure, unspecified     Degeneration of cervical intervertebral disc     Essential and other specified forms of tremor     Gout     HIGH CHOLESTEROL     History of CVA (cerebrovascular accident)     Hx of TABLET    Take 1 tablet by mouth daily    TIZANIDINE (ZANAFLEX) 4 MG TABLET    Take 4 mg by mouth every 6 hours as needed    TOPIRAMATE (TOPAMAX) 50 MG TABLET    Take 1 tablet by mouth 2 times daily    TORSEMIDE (DEMADEX) 20 MG TABLET    10 mg alternating with 20 mg    VITAMIN D (ERGOCALCIFEROL) 1.25 MG (46358 UT) CAPS CAPSULE    Take 50,000 Units by mouth once a week         ALLERGIES     Aspirin; Ativan [lorazepam]; Diltiazem; Sulfa antibiotics; Dabigatran; Lipitor [atorvastatin]; Mysoline [primidone];  Nsaids; and Other    FAMILYHISTORY       Family History   Problem Relation Age of Onset    Cancer Sister     Heart Disease Sister     Diabetes Brother     Hypertension Brother     Heart Disease Brother     Stroke Maternal Aunt     Heart Disease Maternal Aunt     Diabetes Maternal Uncle     Hypertension Paternal Aunt     Cancer Maternal Grandmother     Cancer Paternal Grandmother     Rheum Arthritis Neg Hx     Lupus Neg Hx           SOCIAL HISTORY       Social History     Socioeconomic History    Marital status:      Spouse name: Yeyo Mckeon Number of children: 3    Years of education: 15    Highest education level: None   Occupational History    None   Social Needs    Financial resource strain: None    Food insecurity     Worry: None     Inability: None    Transportation needs     Medical: None     Non-medical: None   Tobacco Use    Smoking status: Former Smoker     Packs/day: 1.00     Years: 28.00     Pack years: 28.00     Types: Cigarettes     Last attempt to quit: 1987     Years since quittin.3    Smokeless tobacco: Never Used    Tobacco comment: H.O.smoking at age 15 / smoked up to 1 p.p.d / quit    Substance and Sexual Activity    Alcohol use: Not Currently     Alcohol/week: 0.0 standard drinks    Drug use: No    Sexual activity: Not Currently   Lifestyle    Physical activity     Days per week: None     Minutes per session: None    Stress: None   Relationships   

## 2020-05-11 ENCOUNTER — CARE COORDINATION (OUTPATIENT)
Dept: CARE COORDINATION | Age: 80
End: 2020-05-11

## 2020-05-11 ENCOUNTER — NURSE TRIAGE (OUTPATIENT)
Dept: OTHER | Facility: CLINIC | Age: 80
End: 2020-05-11

## 2020-05-11 NOTE — CARE COORDINATION
scheduling followup  Reviewed cdcd rec re covid  No sx  Provided info re [facility] available, hotline and importance of calling PCP if concerned.

## 2020-05-12 ENCOUNTER — OFFICE VISIT (OUTPATIENT)
Dept: INTERNAL MEDICINE CLINIC | Age: 80
End: 2020-05-12
Payer: MEDICARE

## 2020-05-12 ENCOUNTER — TELEPHONE (OUTPATIENT)
Dept: FAMILY MEDICINE CLINIC | Age: 80
End: 2020-05-12

## 2020-05-12 VITALS
HEART RATE: 70 BPM | SYSTOLIC BLOOD PRESSURE: 110 MMHG | TEMPERATURE: 97.2 F | DIASTOLIC BLOOD PRESSURE: 78 MMHG | OXYGEN SATURATION: 96 %

## 2020-05-12 PROCEDURE — G8420 CALC BMI NORM PARAMETERS: HCPCS | Performed by: PHYSICIAN ASSISTANT

## 2020-05-12 PROCEDURE — 1090F PRES/ABSN URINE INCON ASSESS: CPT | Performed by: PHYSICIAN ASSISTANT

## 2020-05-12 PROCEDURE — 90715 TDAP VACCINE 7 YRS/> IM: CPT | Performed by: PHYSICIAN ASSISTANT

## 2020-05-12 PROCEDURE — 1036F TOBACCO NON-USER: CPT | Performed by: PHYSICIAN ASSISTANT

## 2020-05-12 PROCEDURE — 99213 OFFICE O/P EST LOW 20 MIN: CPT | Performed by: PHYSICIAN ASSISTANT

## 2020-05-12 PROCEDURE — 90471 IMMUNIZATION ADMIN: CPT | Performed by: PHYSICIAN ASSISTANT

## 2020-05-12 PROCEDURE — G8427 DOCREV CUR MEDS BY ELIG CLIN: HCPCS | Performed by: PHYSICIAN ASSISTANT

## 2020-05-12 PROCEDURE — 4040F PNEUMOC VAC/ADMIN/RCVD: CPT | Performed by: PHYSICIAN ASSISTANT

## 2020-05-12 PROCEDURE — G8399 PT W/DXA RESULTS DOCUMENT: HCPCS | Performed by: PHYSICIAN ASSISTANT

## 2020-05-12 PROCEDURE — 1123F ACP DISCUSS/DSCN MKR DOCD: CPT | Performed by: PHYSICIAN ASSISTANT

## 2020-05-12 ASSESSMENT — ENCOUNTER SYMPTOMS
COLOR CHANGE: 1
SHORTNESS OF BREATH: 0
CONSTIPATION: 0
BACK PAIN: 0
NAUSEA: 0
VOMITING: 0
COUGH: 0
ABDOMINAL PAIN: 0

## 2020-05-12 ASSESSMENT — PATIENT HEALTH QUESTIONNAIRE - PHQ9
SUM OF ALL RESPONSES TO PHQ QUESTIONS 1-9: 0
SUM OF ALL RESPONSES TO PHQ QUESTIONS 1-9: 0
2. FEELING DOWN, DEPRESSED OR HOPELESS: 0
SUM OF ALL RESPONSES TO PHQ9 QUESTIONS 1 & 2: 0
1. LITTLE INTEREST OR PLEASURE IN DOING THINGS: 0

## 2020-05-12 NOTE — TELEPHONE ENCOUNTER
Recent Travel Screening and Travel History documentation:     Travel Screening       Question Response     Do you have any of the following symptoms? None of these     In the last month, have you been in contact with someone who was confirmed or suspected to have Coronavirus / COVID-19? No / Unsure     Have you traveled internationally in the last month?  No      Travel History   Travel since 04/12/20     No documented travel since 04/12/20

## 2020-05-14 RX ORDER — RIVAROXABAN 15 MG/1
TABLET, FILM COATED ORAL
Qty: 90 TABLET | Refills: 3 | Status: SHIPPED | OUTPATIENT
Start: 2020-05-14 | End: 2020-12-21

## 2020-05-15 NOTE — TELEPHONE ENCOUNTER
Mariam Peng, she needs to keep it wrapped but not too tight and keep it elevated as much as possible.

## 2020-05-18 ENCOUNTER — NURSE TRIAGE (OUTPATIENT)
Dept: OTHER | Facility: CLINIC | Age: 80
End: 2020-05-18

## 2020-05-18 ENCOUNTER — TELEPHONE (OUTPATIENT)
Dept: FAMILY MEDICINE CLINIC | Age: 80
End: 2020-05-18

## 2020-05-18 NOTE — TELEPHONE ENCOUNTER
Pt's daughter was sent over from nurse triage and called today with Bruising from injury of right wrist ~3 weeks ago. This morning the bruising extended past elbow and is still very painful. Patient is on blood thinners. Was offered an appointment for Virtual Visit, refused appointment. Patient would like Guicho Santos MD to see her in the office. Please contact patient's daughter to schedule [address], [facility] [address] [phone] - F [phone] pharmacy confirmed in [facility].     Patient was made aware of e-visits via ilab

## 2020-05-20 ENCOUNTER — OFFICE VISIT (OUTPATIENT)
Dept: FAMILY MEDICINE CLINIC | Age: 80
End: 2020-05-20
Payer: MEDICARE

## 2020-05-20 VITALS
OXYGEN SATURATION: 95 % | HEART RATE: 70 BPM | RESPIRATION RATE: 12 BRPM | WEIGHT: 133.38 LBS | BODY MASS INDEX: 23.63 KG/M2 | TEMPERATURE: 98.8 F | DIASTOLIC BLOOD PRESSURE: 80 MMHG | SYSTOLIC BLOOD PRESSURE: 120 MMHG

## 2020-05-20 PROCEDURE — G8420 CALC BMI NORM PARAMETERS: HCPCS | Performed by: FAMILY MEDICINE

## 2020-05-20 PROCEDURE — 1090F PRES/ABSN URINE INCON ASSESS: CPT | Performed by: FAMILY MEDICINE

## 2020-05-20 PROCEDURE — 1036F TOBACCO NON-USER: CPT | Performed by: FAMILY MEDICINE

## 2020-05-20 PROCEDURE — G8399 PT W/DXA RESULTS DOCUMENT: HCPCS | Performed by: FAMILY MEDICINE

## 2020-05-20 PROCEDURE — 1123F ACP DISCUSS/DSCN MKR DOCD: CPT | Performed by: FAMILY MEDICINE

## 2020-05-20 PROCEDURE — G8428 CUR MEDS NOT DOCUMENT: HCPCS | Performed by: FAMILY MEDICINE

## 2020-05-20 PROCEDURE — 99213 OFFICE O/P EST LOW 20 MIN: CPT | Performed by: FAMILY MEDICINE

## 2020-05-20 PROCEDURE — 4040F PNEUMOC VAC/ADMIN/RCVD: CPT | Performed by: FAMILY MEDICINE

## 2020-05-20 RX ORDER — PREDNISONE 10 MG/1
TABLET ORAL
Qty: 20 TABLET | Refills: 0 | Status: SHIPPED | OUTPATIENT
Start: 2020-05-20 | End: 2020-06-03

## 2020-05-22 ENCOUNTER — HOSPITAL ENCOUNTER (OUTPATIENT)
Dept: ONCOLOGY | Age: 80
Setting detail: INFUSION SERIES
Discharge: HOME | End: 2020-05-22
Payer: MEDICARE

## 2020-05-22 VITALS
BODY MASS INDEX: 23.56 KG/M2 | TEMPERATURE: 97.4 F | HEART RATE: 68 BPM | RESPIRATION RATE: 14 BRPM | SYSTOLIC BLOOD PRESSURE: 121 MMHG | DIASTOLIC BLOOD PRESSURE: 49 MMHG | WEIGHT: 133 LBS

## 2020-05-22 DIAGNOSIS — D50.9 IRON DEFICIENCY ANEMIA, UNSPECIFIED IRON DEFICIENCY ANEMIA TYPE: Primary | ICD-10-CM

## 2020-05-22 DIAGNOSIS — D64.9 ANEMIA, UNSPECIFIED TYPE: ICD-10-CM

## 2020-05-22 PROCEDURE — 2580000003 HC RX 258: Performed by: CLINICAL NURSE SPECIALIST

## 2020-05-22 PROCEDURE — 6360000002 HC RX W HCPCS: Performed by: CLINICAL NURSE SPECIALIST

## 2020-05-22 PROCEDURE — 96365 THER/PROPH/DIAG IV INF INIT: CPT

## 2020-05-22 RX ORDER — SODIUM CHLORIDE 9 MG/ML
INJECTION, SOLUTION INTRAVENOUS CONTINUOUS
Status: ACTIVE | OUTPATIENT
Start: 2020-05-22 | End: 2020-05-22

## 2020-05-22 RX ORDER — SODIUM CHLORIDE 9 MG/ML
INJECTION, SOLUTION INTRAVENOUS CONTINUOUS
Status: CANCELLED | OUTPATIENT
Start: 2020-05-29

## 2020-05-22 RX ORDER — SODIUM CHLORIDE 0.9 % (FLUSH) 0.9 %
10 SYRINGE (ML) INJECTION PRN
Status: CANCELLED | OUTPATIENT
Start: 2020-05-29

## 2020-05-22 RX ORDER — SODIUM CHLORIDE 0.9 % (FLUSH) 0.9 %
10 SYRINGE (ML) INJECTION PRN
Status: DISCONTINUED | OUTPATIENT
Start: 2020-05-22 | End: 2020-05-23 | Stop reason: HOSPADM

## 2020-05-22 RX ADMIN — SODIUM CHLORIDE 200 MG: 9 INJECTION, SOLUTION INTRAVENOUS at 11:19

## 2020-05-22 RX ADMIN — SODIUM CHLORIDE: 9 INJECTION, SOLUTION INTRAVENOUS at 11:21

## 2020-05-22 NOTE — PROGRESS NOTES
Patient ambulatory to department for second dose of four doses of venofer. Tolerated venofer infusion well with no adverse rx noted. Given avs with information about venofer. Verbally told about most common possible side effects. To return next week for next infusion.

## 2020-05-27 ENCOUNTER — NURSE TRIAGE (OUTPATIENT)
Dept: OTHER | Facility: CLINIC | Age: 80
End: 2020-05-27

## 2020-05-27 NOTE — TELEPHONE ENCOUNTER
Reason for Disposition   SEVERE dizziness (e.g., unable to stand, requires support to walk, feels like passing out now)    Answer Assessment - Initial Assessment Questions  1. DESCRIPTION: \"Describe your dizziness. \"      \"Very severe. My head feels really strange\"    2. LIGHTHEADED: \"Do you feel lightheaded? \" (e.g., somewhat faint, woozy, weak upon standing)      \"Yes\"    3. VERTIGO: \"Do you feel like either you or the room is spinning or tilting? \" (i.e. vertigo)      \"I don't like to stand because I don't feel steady. It's just a strange feeling in my head sitting. It's hard to explain. \"    4. SEVERITY: \"How bad is it? \"  \"Do you feel like you are going to faint? \" \"Can you stand and walk? \"    - MILD - walking normally    - MODERATE - interferes with normal activities (e.g., work, school)     - SEVERE - unable to stand, requires support to walk, feels like passing out now. Severe    5. ONSET:  \"When did the dizziness begin? \"  5 days ago        6. AGGRAVATING FACTORS: \"Does anything make it worse? \" (e.g., standing, change in head position)  Standing up and moving        7. HEART RATE: \"Can you tell me your heart rate? \" \"How many beats in 15 seconds? \"  (Note: not all patients can do this)        \"I took my blood pressure, it was 123/71 in the left arm, and pulse is 70\"  Temperature (under the tongue): 98.6F    8. CAUSE: \"What do you think is causing the dizziness?\"     Spoke with daughter, Renetta Grullon. \"She thought it was her vertigo, but it's getting worse. \"    9. RECURRENT SYMPTOM: \"Have you had dizziness before? \" If so, ask: \"When was the last time? \" \"What happened that time? \"     \"She's had this before but not this bad or this long. She usually takes a vertigo pill and it kicks it. \"    10. OTHER SYMPTOMS: \"Do you have any other symptoms? \" (e.g., fever, chest pain, vomiting, diarrhea, bleeding)        \"No\"    11. PREGNANCY: \"Is there any chance you are pregnant? \" \"When was your last menstrual period? \"

## 2020-05-29 ENCOUNTER — HOSPITAL ENCOUNTER (OUTPATIENT)
Dept: ONCOLOGY | Age: 80
Setting detail: INFUSION SERIES
Discharge: HOME | End: 2020-05-29
Payer: MEDICARE

## 2020-05-29 VITALS
HEART RATE: 72 BPM | WEIGHT: 133 LBS | RESPIRATION RATE: 14 BRPM | TEMPERATURE: 97.6 F | BODY MASS INDEX: 23.56 KG/M2 | SYSTOLIC BLOOD PRESSURE: 95 MMHG | DIASTOLIC BLOOD PRESSURE: 63 MMHG

## 2020-05-29 DIAGNOSIS — D50.9 IRON DEFICIENCY ANEMIA, UNSPECIFIED IRON DEFICIENCY ANEMIA TYPE: Primary | ICD-10-CM

## 2020-05-29 DIAGNOSIS — D64.9 ANEMIA, UNSPECIFIED TYPE: ICD-10-CM

## 2020-05-29 PROCEDURE — 96365 THER/PROPH/DIAG IV INF INIT: CPT

## 2020-05-29 PROCEDURE — 2580000003 HC RX 258: Performed by: CLINICAL NURSE SPECIALIST

## 2020-05-29 PROCEDURE — 6360000002 HC RX W HCPCS: Performed by: CLINICAL NURSE SPECIALIST

## 2020-05-29 RX ORDER — SODIUM CHLORIDE 9 MG/ML
INJECTION, SOLUTION INTRAVENOUS CONTINUOUS
Status: ACTIVE | OUTPATIENT
Start: 2020-05-29 | End: 2020-05-29

## 2020-05-29 RX ORDER — SODIUM CHLORIDE 0.9 % (FLUSH) 0.9 %
10 SYRINGE (ML) INJECTION PRN
Status: DISCONTINUED | OUTPATIENT
Start: 2020-05-29 | End: 2020-05-30 | Stop reason: HOSPADM

## 2020-05-29 RX ORDER — SODIUM CHLORIDE 0.9 % (FLUSH) 0.9 %
10 SYRINGE (ML) INJECTION PRN
Status: CANCELLED | OUTPATIENT
Start: 2020-06-05

## 2020-05-29 RX ORDER — SODIUM CHLORIDE 9 MG/ML
INJECTION, SOLUTION INTRAVENOUS CONTINUOUS
Status: CANCELLED | OUTPATIENT
Start: 2020-06-05

## 2020-05-29 RX ADMIN — SODIUM CHLORIDE 200 MG: 9 INJECTION, SOLUTION INTRAVENOUS at 14:30

## 2020-05-29 RX ADMIN — SODIUM CHLORIDE: 9 INJECTION, SOLUTION INTRAVENOUS at 14:28

## 2020-05-29 NOTE — PROGRESS NOTES
Patient ambulatory to department for 3rd dose of four doses of venofer. Tolerated venofer infusion well with no adverse rx noted. Given avs with information about venofer. Verbally told about most common possible side effects.  To return next week for next infusion

## 2020-06-03 ENCOUNTER — OFFICE VISIT (OUTPATIENT)
Dept: FAMILY MEDICINE CLINIC | Age: 80
End: 2020-06-03
Payer: MEDICARE

## 2020-06-03 VITALS
TEMPERATURE: 97.7 F | OXYGEN SATURATION: 97 % | DIASTOLIC BLOOD PRESSURE: 72 MMHG | SYSTOLIC BLOOD PRESSURE: 112 MMHG | HEART RATE: 70 BPM

## 2020-06-03 PROCEDURE — 1036F TOBACCO NON-USER: CPT | Performed by: FAMILY MEDICINE

## 2020-06-03 PROCEDURE — G8420 CALC BMI NORM PARAMETERS: HCPCS | Performed by: FAMILY MEDICINE

## 2020-06-03 PROCEDURE — 1090F PRES/ABSN URINE INCON ASSESS: CPT | Performed by: FAMILY MEDICINE

## 2020-06-03 PROCEDURE — 1123F ACP DISCUSS/DSCN MKR DOCD: CPT | Performed by: FAMILY MEDICINE

## 2020-06-03 PROCEDURE — G8427 DOCREV CUR MEDS BY ELIG CLIN: HCPCS | Performed by: FAMILY MEDICINE

## 2020-06-03 PROCEDURE — 99214 OFFICE O/P EST MOD 30 MIN: CPT | Performed by: FAMILY MEDICINE

## 2020-06-03 PROCEDURE — 4040F PNEUMOC VAC/ADMIN/RCVD: CPT | Performed by: FAMILY MEDICINE

## 2020-06-03 PROCEDURE — G8399 PT W/DXA RESULTS DOCUMENT: HCPCS | Performed by: FAMILY MEDICINE

## 2020-06-03 RX ORDER — PREDNISONE 20 MG/1
TABLET ORAL
Qty: 15 TABLET | Refills: 0 | Status: SHIPPED | OUTPATIENT
Start: 2020-06-03 | End: 2020-08-06 | Stop reason: ALTCHOICE

## 2020-06-03 NOTE — PROGRESS NOTES
Subjective:      Patient ID: Vivian Lee is a 78 y.o. female. CC: Patient presents for re-evaluation of chronic health problems including increasing right knee pain, anxiety related to 's health, chronic heart failure, anemia, hypertension and renal failure. HPI Patient presents today for a follow-up on chronic medications and medical conditions. Patient has some sharp pain near her right knee which is worse when walking around. Patient has had no particular injury to the knee. The discomfort just started in the last 4 to 5 days. Patient has a lot of stress with her  increasing memory issues and declining health. Patient recently was treated for a urinary tract infection at the emergency room. She continues getting iron infusion with oncology. Atrial fibrillation is persistent in nature and she is also under care of nephrology for chronic renal failure. She is cautious with her diet. She denies any chest pain or shortness of breath symptoms.     Review of Systems     Patient Active Problem List   Diagnosis    Chronic atrial fibrillation    Essential hypertension, benign    Mixed hyperlipidemia    Chronic gouty arthropathy    Acquired hypothyroidism    Arthritis    Non-rheumatic mitral regurgitation    Restrictive lung disease    Sick sinus syndrome (HCC)    Allergic rhinitis    Fibrocystic breast    Cerebrovascular accident (CVA) ([address]    HTN (hypertension), benign    Pacemaker    Primary osteoarthritis involving multiple joints    Vitamin D deficiency    Tremor    Chronic total occlusion of native coronary artery    Age related osteoporosis    Acute on chronic systolic (congestive) heart failure (HCC)    Chronic renal failure, stage 3 (moderate) (HCC)    [facility]AD [facility]peripheral artery disease) ([facility])    Atherosclerosis of native arteries of extremities with intermittent claudication, bilateral legs (HCC)    Acute pulmonary edema (HCC)    Idiopathic progressive neuropathy    Ischemic cardiomyopathy    Dizziness    Peripheral vertigo, bilateral    PAF (paroxysmal atrial fibrillation) (HCC)    Dyslipidemia    Heart failure (HCC)    Generalized weakness    Iron deficiency anemia    Anemia       Outpatient Medications Marked as Taking for the 6/3/20 encounter (Office Visit) with Maddison Blanco MD   Medication Sig Dispense Refill    XARELTO 15 MG TABS tablet TAKE 1 TABLET DAILY WITH BREAKFAST 90 tablet 3    metoprolol succinate (TOPROL XL) 100 MG extended release tablet Take 1 tablet by mouth 2 times daily 60 tablet 0    allopurinol (ZYLOPRIM) 100 MG tablet Take 1 tablet by mouth daily 90 tablet 1    torsemide (DEMADEX) 20 MG tablet 10 mg alternating with 20 mg 30 tablet 0    clopidogrel (PLAVIX) 75 MG tablet Take 1 tablet by mouth daily 90 tablet 2    topiramate (TOPAMAX) 50 MG tablet Take 1 tablet by mouth 2 times daily 60 tablet 3    levothyroxine (SYNTHROID) 112 MCG tablet TAKE 1 TABLET DAILY 90 tablet 1    allopurinol (ZYLOPRIM) 300 MG tablet Take 1 tablet by mouth daily 90 tablet 1    tiZANidine (ZANAFLEX) 4 MG tablet Take 4 mg by mouth every 6 hours as needed      vitamin D (ERGOCALCIFEROL) 1.25 MG (41773 UT) CAPS capsule Take 50,000 Units by mouth once a week      meclizine (ANTIVERT) 12.5 MG tablet Take 1 tablet by mouth 3 times daily as needed for Dizziness or Nausea 90 tablet 0       Allergies   Allergen Reactions    Aspirin Nausea Only    Ativan [Lorazepam] Other (See Comments)     hallucinations    Diltiazem Anaphylaxis    Sulfa Antibiotics Rash and Hives    Dabigatran Nausea Only     And indigestion  And indigestion    Aka Pradaxa    Lipitor [Atorvastatin] Other (See Comments)     Muscle pains    Mysoline [Primidone]     Nsaids     Other Other (See Comments)     Nitroglycerin patches causes severe headaches       Social History     Tobacco Use    Smoking status: Former Smoker     Packs/day: 1.00     Years: 28.00     Pack years:

## 2020-06-05 ENCOUNTER — HOSPITAL ENCOUNTER (OUTPATIENT)
Dept: ONCOLOGY | Age: 80
Setting detail: INFUSION SERIES
End: 2020-06-05
Payer: MEDICARE

## 2020-06-11 ENCOUNTER — TELEPHONE (OUTPATIENT)
Dept: FAMILY MEDICINE CLINIC | Age: 80
End: 2020-06-11

## 2020-06-11 ENCOUNTER — OFFICE VISIT (OUTPATIENT)
Dept: FAMILY MEDICINE CLINIC | Age: 80
End: 2020-06-11
Payer: MEDICARE

## 2020-06-11 ENCOUNTER — NURSE TRIAGE (OUTPATIENT)
Dept: OTHER | Facility: CLINIC | Age: 80
End: 2020-06-11

## 2020-06-11 VITALS
BODY MASS INDEX: 23.56 KG/M2 | HEART RATE: 64 BPM | WEIGHT: 133 LBS | SYSTOLIC BLOOD PRESSURE: 122 MMHG | DIASTOLIC BLOOD PRESSURE: 74 MMHG | OXYGEN SATURATION: 99 % | TEMPERATURE: 97.1 F

## 2020-06-11 PROCEDURE — 99213 OFFICE O/P EST LOW 20 MIN: CPT | Performed by: NURSE PRACTITIONER

## 2020-06-11 RX ORDER — ONDANSETRON 4 MG/1
4 TABLET, FILM COATED ORAL 3 TIMES DAILY PRN
Qty: 30 TABLET | Refills: 0 | Status: SHIPPED | OUTPATIENT
Start: 2020-06-11 | End: 2020-06-21

## 2020-06-11 ASSESSMENT — ENCOUNTER SYMPTOMS
SHORTNESS OF BREATH: 0
NAUSEA: 1
VOMITING: 0
SINUS PAIN: 0
SINUS PRESSURE: 0

## 2020-06-11 NOTE — PROGRESS NOTES
person, place, and time. Sensory: No sensory deficit. Motor: Weakness present. Coordination: Coordination abnormal.      Gait: Gait abnormal.      Comments: Extreme dizziness, difficulty changing positions, walking straight         Assessment/Plan    1. Vertigo  Advised to stop meclizine  Advised to start OTC antihistamine  Discussed differentials including Menieres', BPPV  Advised to follow up with Cardiology  Stressed safety d/t high fall risk  - Teto Hawley MD, Otolaryngology, Deysi Robins MD, Neurology, [facility]  - ondansetron (ZOFRAN) 4 MG tablet; Take 1 tablet by mouth 3 times daily as needed for Nausea or Other (vertigo)  Dispense: 30 tablet; Refill: 0      Return if symptoms worsen or fail to improve, for unresolved symptoms. This dictation was generated by voice recognition computer software. Although all attempts are made to edit the dictation for accuracy, there may be errors in the transcription that are not intended.

## 2020-06-12 ENCOUNTER — OFFICE VISIT (OUTPATIENT)
Dept: ENT CLINIC | Age: 80
End: 2020-06-12
Payer: MEDICARE

## 2020-06-12 VITALS
WEIGHT: 130 LBS | TEMPERATURE: 97 F | DIASTOLIC BLOOD PRESSURE: 60 MMHG | HEART RATE: 82 BPM | HEIGHT: 63 IN | SYSTOLIC BLOOD PRESSURE: 97 MMHG | BODY MASS INDEX: 23.04 KG/M2

## 2020-06-12 PROCEDURE — 1036F TOBACCO NON-USER: CPT | Performed by: OTOLARYNGOLOGY

## 2020-06-12 PROCEDURE — 1090F PRES/ABSN URINE INCON ASSESS: CPT | Performed by: OTOLARYNGOLOGY

## 2020-06-12 PROCEDURE — 1123F ACP DISCUSS/DSCN MKR DOCD: CPT | Performed by: OTOLARYNGOLOGY

## 2020-06-12 PROCEDURE — 4040F PNEUMOC VAC/ADMIN/RCVD: CPT | Performed by: OTOLARYNGOLOGY

## 2020-06-12 PROCEDURE — G8420 CALC BMI NORM PARAMETERS: HCPCS | Performed by: OTOLARYNGOLOGY

## 2020-06-12 PROCEDURE — G8399 PT W/DXA RESULTS DOCUMENT: HCPCS | Performed by: OTOLARYNGOLOGY

## 2020-06-12 PROCEDURE — G8427 DOCREV CUR MEDS BY ELIG CLIN: HCPCS | Performed by: OTOLARYNGOLOGY

## 2020-06-12 PROCEDURE — 99214 OFFICE O/P EST MOD 30 MIN: CPT | Performed by: OTOLARYNGOLOGY

## 2020-06-12 NOTE — PATIENT INSTRUCTIONS
ENG TESTING INSTRUCTIONS      The inner ear has two main components, one for hearing and one for equilibrium balance. In order to diagnose dizziness, we need to test both components. Toward that end, I have recommended an audiogram (hearing test) and ENG (electronystagmogram), or balance system testing. This testing will take approximately 1.5 to 2 hours. Please be in time as the audiologist runs on schedule him and your test will need to be rescheduled if you arrive after the scheduled starting time. Until your testing is completed and you have a follow up visit, please maintain these dizziness activity precautions:  1. Avoid working at [place], on ladders or scaffolding or near the edges of platforms or using heavy or complicated machinery or operating a motorized vehicle, or any activity where loss of consciousness or equilibrium would be hazardous to yourself or others, if you are experiencing dizziness, and until having been dizzy free for 72 hours. Even then, take appropriate care and precautions as dizziness could occur at any time. 2. Avoid any motions or activity that results in the off balance sensation. Stand up or arise slowly and in stages, as we discussed. YOU MUST NOT TAKE ANY OF THE FOLLOWING MEDICATIONS FOR 48 - 72 HOURS BEFORE YOUR TEST:  · Any medications that can make you drowsy or sleepy  · Allergy pills  · Sedative pills   · Tranquilizers/anti-anxiety medications (Valium, Librium, Xanax, Ativan, etc.)  · Sleeping pills   · Antihistamines/Decongestants (Benadryl, Sudafed, Dimetapp, Chlor-Trimeton)  · Pain pills/Narcotics/Barbiturates (codeine, Demerol, hydrocodone, Norco, Vicodin, oxycodone, Percocet, Percodan, OxyContin, tramadol, phenobarbital, antidepressants)      · Diet pills. · Nerve/muscle relaxant pills (Robaxin, Valium)  · Anti-dizziness pills.   (meclizine, Antivert, Bonine, ear patches, clonazepam/Klonopin, scopolamine/TRANSDERM-SCOP, etc.)  · Anti-nausea

## 2020-06-15 ENCOUNTER — TELEPHONE (OUTPATIENT)
Dept: ENT CLINIC | Age: 80
End: 2020-06-15

## 2020-06-15 ENCOUNTER — TELEPHONE (OUTPATIENT)
Dept: FAMILY MEDICINE CLINIC | Age: 80
End: 2020-06-15

## 2020-06-15 NOTE — TELEPHONE ENCOUNTER
Spoke to patient's daughter Eugenie Gonzalez, assured her that this was a reasonable date given the circumstances. Patient's Daughter Eugenie Gonzalez is going to call back and verify appointment date and Time with Dr. Renetta Ly.

## 2020-06-15 NOTE — TELEPHONE ENCOUNTER
Pt called because she has vertigo and is unable to get an apt with Dr. Mohammed Mortimer until 06/23/2020. She wanted to know if there is another audiologist you would recommend for her.

## 2020-07-01 LAB
ALBUMIN SERUM: 3.5 G/DL (ref 3.4–5)
ANION GAP SERPL CALCULATED.3IONS-SCNC: 5 MMOL/L (ref 7–16)
BUN BLDV-MCNC: 31 MG/DL (ref 7–18)
CALCIUM SERPL-MCNC: 8.7 MG/DL (ref 8.5–10.1)
CHLORIDE BLD-SCNC: 118 MMOL/L (ref 98–107)
CO2: 23 MMOL/L (ref 21–32)
CREATININE + EGFR PANEL: 1.6 MG/DL (ref 0.6–1.3)
CREATININE URINE: 205 MG/DL
GFR AFRICAN AMERICAN: 38 ML/MIN/1.73M2
GFR NON-AFRICAN AMERICAN: 31 ML/MIN/1.73M2
GLUCOSE: 82 MG/DL (ref 74–106)
HCT VFR BLD CALC: 47.5 % (ref 35.8–46.5)
HEMOGLOBIN: 14.3 G/DL (ref 12.1–15.8)
MCH RBC QN AUTO: 26.1 PG (ref 28.4–33.4)
MCHC RBC AUTO-ENTMCNC: 30.1 G/DL (ref 31.1–37)
MCV RBC AUTO: 86.9 FL (ref 85–99)
PARATHYROID HORMONE INTACT: 71.7 PG/ML (ref 12–88)
PDW BLD-RTO: 20.5 % (ref 11.7–15.2)
PHOSPHORUS: 3 MG/DL (ref 2.5–4.9)
PLATELET # BLD: 326 K/UL (ref 154–393)
POTASSIUM SERPL-SCNC: 4.2 MMOL/L (ref 3.5–5.1)
PROTEIN, URINE, RANDOM: 47 MG/DL
PROTEIN/CREAT RATIO URINE RAN: 0.2 MG/DL (ref 0–0.1)
RBC # BLD: 5.47 M/UL (ref 3.86–5.17)
SLIDE REVIEW: NORMAL
SODIUM BLD-SCNC: 146 MMOL/L (ref 136–145)
VITAMIN D 25-HYDROXY: 75.1 NG/ML
WBC # BLD: 7 K/UL (ref 4–10.5)

## 2020-07-07 ENCOUNTER — TELEPHONE (OUTPATIENT)
Dept: VASCULAR SURGERY | Age: 80
End: 2020-07-07

## 2020-07-07 RX ORDER — TORSEMIDE 20 MG/1
TABLET ORAL
Qty: 30 TABLET | Refills: 1 | Status: SHIPPED | OUTPATIENT
Start: 2020-07-07 | End: 2020-07-20 | Stop reason: SDUPTHER

## 2020-07-07 NOTE — TELEPHONE ENCOUNTER
----- Message from Adolph Jacinto MD sent at 7/7/2020  7:04 AM EDT -----  Regarding: RE: Recommendation? ?  I would suggest that we consider angiogram.  We can limit contrast exposure given her kidney disease and hydrate before and after if she is willing  thanks  ----- Message -----  From: Elizabeth Arreola MA  Sent: 6/18/2020   2:20 PM EDT  To: Adolph Jacinto MD  Subject: Recommendation? ?                                 Patient's daughter Salvador Sue) called to see if you had any further recommendation. She can not have an MRI because she has a pacemaker. She can not have the CT scan with Contrast because she had bad kidney disease. Her legs are very painful, she can barely walk.  If there is something you can do or recommend please ask Delmis or Joyce Mems to help them with this. thanks

## 2020-07-07 NOTE — TELEPHONE ENCOUNTER
torsemide (DEMADEX) 20 MG tablet 30 tablet 0 4/22/2020     Pharmacy:  [facility] [address] [place] [address] [phone] - F [phone]

## 2020-07-15 ENCOUNTER — OFFICE VISIT (OUTPATIENT)
Dept: ENT CLINIC | Age: 80
End: 2020-07-15
Payer: MEDICARE

## 2020-07-15 VITALS
HEART RATE: 70 BPM | TEMPERATURE: 97.5 F | WEIGHT: 130 LBS | HEIGHT: 63 IN | DIASTOLIC BLOOD PRESSURE: 60 MMHG | SYSTOLIC BLOOD PRESSURE: 125 MMHG | BODY MASS INDEX: 23.04 KG/M2

## 2020-07-15 PROBLEM — H90.3 BILATERAL SENSORINEURAL HEARING LOSS: Status: ACTIVE | Noted: 2020-07-15

## 2020-07-15 PROCEDURE — G8399 PT W/DXA RESULTS DOCUMENT: HCPCS | Performed by: OTOLARYNGOLOGY

## 2020-07-15 PROCEDURE — 4040F PNEUMOC VAC/ADMIN/RCVD: CPT | Performed by: OTOLARYNGOLOGY

## 2020-07-15 PROCEDURE — 99214 OFFICE O/P EST MOD 30 MIN: CPT | Performed by: OTOLARYNGOLOGY

## 2020-07-15 PROCEDURE — 1090F PRES/ABSN URINE INCON ASSESS: CPT | Performed by: OTOLARYNGOLOGY

## 2020-07-15 PROCEDURE — G8420 CALC BMI NORM PARAMETERS: HCPCS | Performed by: OTOLARYNGOLOGY

## 2020-07-15 PROCEDURE — 1123F ACP DISCUSS/DSCN MKR DOCD: CPT | Performed by: OTOLARYNGOLOGY

## 2020-07-15 PROCEDURE — 1036F TOBACCO NON-USER: CPT | Performed by: OTOLARYNGOLOGY

## 2020-07-15 PROCEDURE — G8427 DOCREV CUR MEDS BY ELIG CLIN: HCPCS | Performed by: OTOLARYNGOLOGY

## 2020-07-15 NOTE — PATIENT INSTRUCTIONS
· Avoid working or being at heights, on ladders or scaffolding or near the edges of platforms or using heavy or complicated machinery or operating a motorized vehicle if you are experiencing dizziness and until having been dizzy free for 72 hours. Even then, take appropriate care and precautions as dizziness could occur at any time. · Avoid any motions or activity that results in the off balance sensation. Stand up or arise slowly and in stages, as we discussed. · You should consider amplification therapy, hearing aid, if you are having difficulty communicating and/or hearing important sounds. I recommend bilateral hearing aids for aural rehabilitation and to aid in restoring functional hearing. You may follow up with Dr. Demetri Tello or with another hearing aid provider of your choice. · You should return for an annual hearing test to monitor your hearing, and whenever your hearing further decreases significantly. · You should return if your ears plug up with ear wax causing difficulty hearing or pressure. · You should employ the tinnitus masking techniques and strategies we discussed, as needed. Bedside tinnitus masking devices (eg. a white noise machine, noise machine, noise dashawn on your cell phone, fan on in the room, radio with light music or tuned between stations to white noise static) can be very helpful. · You should avoid exposure to excessively high levels of noise/sound and use hearing protection measures we discussed, as needed, if such exposure is unavoidable. · Follow instruction below for vestibular exercises to attempt to reduce your dizziness. We will consider vestibular physical therapy if these exercises do not result in decreased dizziness sufficiently. · NO Q-TIPS IN THE EARS  You should never clean your ears with a Q-tip, cotton tipped applicator, Michelle pin, paper clip, pen cap, nail file, or any other instrument.   I recommend only use of one the several ear wax removal kits

## 2020-07-15 NOTE — PROGRESS NOTES
[address] [place] [address] ENT         CHIEF COMPLAINT:  Chief Complaint   Patient presents with    Dizziness         INTERVAL HISTORY:        Since the last visit here, she continues to get dizzy, but it occurs only when she stands up. \"I can't walk. \"  She is interested in hearing aids and wants to see Dr. Carlos Schmitz for that purpose. AUDIOGRAM:          ENG (see full report for details): COUNSELING:    The audiogram results were reviewed and discussed with Mago Harris, whom I advised of the type of hearing loss, the probable etiology, possible associated impairment and disability, need for noise protection and avoidance, possible benefits of hearing aids, or other amplification therapy. I discussed the etiology of tinnitus and treatment/coping measures including masking techniques, and need for annual and prn hearing tests. Patient was advised of difficulty understanding speech in the presence of moderate to high volume background noise. The ENG results were reviewed and discussed with Mago Harris, whom I advised of the significance of the ENG findings. I discussed the functions of the vestibular system and the probable etiology and diagnosis, vestibular dysfunction and impairment and possible disability. I discussed vestibular exercises, and possible benefits of vestibular rehabilitation therapy. I discussed activity and safety precautions, fall prevention and avoidance, and need for continued follow-up. I discussed possible etiologies of dizziness including, but not limited to: Meniere's disease, vestibular neuronitis, acute labyrinthitis, idiopathic peripheral vestibular dysfunction, benign paroxysmal positional vertigo, autoimmune hearing loss and vestibular dysfunction, multiple sclerosis, vestibular schwannoma (acoustic neuroma), CNS neoplasm, and other CNS disease. I discussed postural hypotension and vertebrobasilar insufficiency. Patient is on Plavix and Xarelto. IMPRESSION / Tyler Centeno / Edson Valencia / PROCEDURES:       Tierra Dinero was seen today for dizziness. Diagnoses and all orders for this visit:    Nikki Jurado MD, Neurology, [facility]    Bilateral sensorineural hearing loss         RECOMMENDATIONS / PLAN:     Patient Instructions   · Avoid working or being at heights, on ladders or scaffolding or near the edges of platforms or using heavy or complicated machinery or operating a motorized vehicle if you are experiencing dizziness and until having been dizzy free for 72 hours. Even then, take appropriate care and precautions as dizziness could occur at any time. · Avoid any motions or activity that results in the off balance sensation. Stand up or arise slowly and in stages, as we discussed. · You should consider amplification therapy, hearing aid, if you are having difficulty communicating and/or hearing important sounds. I recommend bilateral hearing aids for aural rehabilitation and to aid in restoring functional hearing. You may follow up with Dr. Cruz Littlejohn or with another hearing aid provider of your choice. · You should return for an annual hearing test to monitor your hearing, and whenever your hearing further decreases significantly. · You should return if your ears plug up with ear wax causing difficulty hearing or pressure. · You should employ the tinnitus masking techniques and strategies we discussed, as needed. Bedside tinnitus masking devices (eg. a white noise machine, noise machine, noise dashawn on your cell phone, fan on in the room, radio with light music or tuned between stations to white noise static) can be very helpful. · You should avoid exposure to excessively high levels of noise/sound and use hearing protection measures we discussed, as needed, if such exposure is unavoidable. · Follow instruction below for vestibular exercises to attempt to reduce your dizziness.   We will consider vestibular physical therapy if these exercises do not result in decreased dizziness sufficiently. · NO Q-TIPS IN THE EARS  You should never clean your ears with a Q-tip, cotton tipped applicator, Michelle pin, paper clip, pen cap, nail file, or any other instrument. I recommend only use of one the several ear wax removal kits available \"over the counter\" if you feel a need to try to remove ear wax. No other methods should be self used for this purpose as there is danger of injury to the ear and risk of irreparable and irreversible permanent hearing loss. Follow-up  Return in about 2 months (around 9/15/2020) for recheck/follow-up, and sooner if condition worsens. TIME:    Total visit time = 25 minutes; Counseling time = 18 minutes.

## 2020-07-20 ENCOUNTER — TELEPHONE (OUTPATIENT)
Dept: CARDIOLOGY CLINIC | Age: 80
End: 2020-07-20

## 2020-07-20 RX ORDER — METOPROLOL SUCCINATE 100 MG/1
100 TABLET, EXTENDED RELEASE ORAL 2 TIMES DAILY
Qty: 180 TABLET | Refills: 1 | Status: ON HOLD | OUTPATIENT
Start: 2020-07-20 | End: 2020-11-08 | Stop reason: HOSPADM

## 2020-07-20 RX ORDER — TORSEMIDE 20 MG/1
TABLET ORAL
Qty: 30 TABLET | Refills: 1 | Status: SHIPPED | OUTPATIENT
Start: 2020-07-20 | End: 2020-08-06

## 2020-07-20 RX ORDER — METOPROLOL SUCCINATE 100 MG/1
100 TABLET, EXTENDED RELEASE ORAL 2 TIMES DAILY
Qty: 28 TABLET | Refills: 0 | Status: SHIPPED
Start: 2020-07-20 | End: 2020-08-06 | Stop reason: SDUPTHER

## 2020-07-20 RX ORDER — METOPROLOL SUCCINATE 100 MG/1
100 TABLET, EXTENDED RELEASE ORAL DAILY
Qty: 90 TABLET | Refills: 1 | Status: SHIPPED | OUTPATIENT
Start: 2020-07-20 | End: 2020-08-06 | Stop reason: SDUPTHER

## 2020-07-20 NOTE — TELEPHONE ENCOUNTER
Patient called into the office asking if a two week supply for this medication. metoprolol succinate (TOPROL XL) 100 MG extended release tablet  60 tablet  0  4/30/2020      Pharmacy:  [address] [place] [address] [address] [address] [phone] - F [phone]     Patient stated her medication got sent to [facility] [email or web address] and they are saying she had it filled somewhere else, but the patient is stating she did not have it filled anywhere else. And she is out of medication. She is wanting [facility] two [facility] until they figure out what has happened with her medication. She is out of medication. Thank you.

## 2020-07-21 ENCOUNTER — NURSE ONLY (OUTPATIENT)
Dept: CARDIOLOGY CLINIC | Age: 80
End: 2020-07-21
Payer: MEDICARE

## 2020-07-21 PROCEDURE — 93296 REM INTERROG EVL PM/IDS: CPT | Performed by: INTERNAL MEDICINE

## 2020-07-21 PROCEDURE — 93294 REM INTERROG EVL PM/LDLS PM: CPT | Performed by: INTERNAL MEDICINE

## 2020-07-21 NOTE — PROGRESS NOTES
Latitude transmission shows normal sensing and pacing function. Noted NSVT. See interrogation for more details.

## 2020-07-21 NOTE — LETTER
[address] [facility] [phone]  [address] [facility] [phone]    Pacemaker/Defibrillator Clinic          07/21/20        Breanna Treviño  [address] [facility] [address]        Dear Breanna Treviño    This letter is to inform you that we received the transmission from your monitor at home that checks your implanted heart device. The next date your monitor will automatically transmit will be 1-25-21. If your report needs attention we will notify you. Your device and monitor are wireless and most transmit cellularly, but please periodically check your monitor is still plugged in to the electrical outlet. If you still use the telephone land line to send please ensure the connection to the phone bret is secure. This will help to ensure successful automatic transmissions in the future. Also, the monitor needs to be close to you while sleeping at night. Please be aware that the remote device transmission sites are periodically monitored only during regular business hours during which simultaneous in-office device clinics are being run. If your transmission requires attention, we will contact you as soon as possible. Thank you.             [facility]

## 2020-08-04 ENCOUNTER — HOSPITAL ENCOUNTER (OUTPATIENT)
Age: 80
Discharge: HOME | End: 2020-08-04
Payer: MEDICARE

## 2020-08-04 ENCOUNTER — OFFICE VISIT (OUTPATIENT)
Dept: VASCULAR SURGERY | Age: 80
End: 2020-08-04
Payer: MEDICARE

## 2020-08-04 VITALS
TEMPERATURE: 98.3 F | DIASTOLIC BLOOD PRESSURE: 72 MMHG | SYSTOLIC BLOOD PRESSURE: 118 MMHG | BODY MASS INDEX: 23.57 KG/M2 | HEIGHT: 63 IN | WEIGHT: 133 LBS

## 2020-08-04 LAB
ALBUMIN SERPL-MCNC: 3.9 G/DL (ref 3.4–5)
ANION GAP SERPL CALCULATED.3IONS-SCNC: 12 MMOL/L (ref 3–16)
BUN BLDV-MCNC: 32 MG/DL (ref 7–20)
CALCIUM SERPL-MCNC: 9.1 MG/DL (ref 8.3–10.6)
CHLORIDE BLD-SCNC: 110 MMOL/L (ref 99–110)
CO2: 22 MMOL/L (ref 21–32)
CREAT SERPL-MCNC: 1.3 MG/DL (ref 0.6–1.2)
CREATININE URINE: 159 MG/DL (ref 28–259)
GFR AFRICAN AMERICAN: 48
GFR NON-AFRICAN AMERICAN: 39
GLUCOSE BLD-MCNC: 95 MG/DL (ref 70–99)
HCT VFR BLD CALC: 43.7 % (ref 36–48)
HEMOGLOBIN: 13.7 G/DL (ref 12–16)
INR BLD: 1.28 (ref 0.86–1.14)
MCH RBC QN AUTO: 26.7 PG (ref 26–34)
MCHC RBC AUTO-ENTMCNC: 31.3 G/DL (ref 31–36)
MCV RBC AUTO: 85.3 FL (ref 80–100)
PDW BLD-RTO: 21 % (ref 12.4–15.4)
PHOSPHORUS: 3.9 MG/DL (ref 2.5–4.9)
PLATELET # BLD: 331 K/UL (ref 135–450)
PMV BLD AUTO: 9.1 FL (ref 5–10.5)
POTASSIUM SERPL-SCNC: 5.2 MMOL/L (ref 3.5–5.1)
PROTEIN PROTEIN: 15 MG/DL
PROTHROMBIN TIME: 14.9 SEC (ref 10–13.2)
RBC # BLD: 5.12 M/UL (ref 4–5.2)
SODIUM BLD-SCNC: 144 MMOL/L (ref 136–145)
WBC # BLD: 10.1 K/UL (ref 4–11)

## 2020-08-04 PROCEDURE — 85027 COMPLETE CBC AUTOMATED: CPT

## 2020-08-04 PROCEDURE — 82570 ASSAY OF URINE CREATININE: CPT

## 2020-08-04 PROCEDURE — 83880 ASSAY OF NATRIURETIC PEPTIDE: CPT

## 2020-08-04 PROCEDURE — 84156 ASSAY OF PROTEIN URINE: CPT

## 2020-08-04 PROCEDURE — 80069 RENAL FUNCTION PANEL: CPT

## 2020-08-04 PROCEDURE — G8427 DOCREV CUR MEDS BY ELIG CLIN: HCPCS | Performed by: SURGERY

## 2020-08-04 PROCEDURE — 1090F PRES/ABSN URINE INCON ASSESS: CPT | Performed by: SURGERY

## 2020-08-04 PROCEDURE — 1036F TOBACCO NON-USER: CPT | Performed by: SURGERY

## 2020-08-04 PROCEDURE — 1123F ACP DISCUSS/DSCN MKR DOCD: CPT | Performed by: SURGERY

## 2020-08-04 PROCEDURE — G8420 CALC BMI NORM PARAMETERS: HCPCS | Performed by: SURGERY

## 2020-08-04 PROCEDURE — 85610 PROTHROMBIN TIME: CPT

## 2020-08-04 PROCEDURE — 4040F PNEUMOC VAC/ADMIN/RCVD: CPT | Performed by: SURGERY

## 2020-08-04 PROCEDURE — G8399 PT W/DXA RESULTS DOCUMENT: HCPCS | Performed by: SURGERY

## 2020-08-04 PROCEDURE — 99212 OFFICE O/P EST SF 10 MIN: CPT | Performed by: SURGERY

## 2020-08-04 RX ORDER — LEVOTHYROXINE SODIUM 112 UG/1
TABLET ORAL
Qty: 90 TABLET | Refills: 0 | Status: SHIPPED | OUTPATIENT
Start: 2020-08-04 | End: 2020-09-08 | Stop reason: SDUPTHER

## 2020-08-04 NOTE — PROGRESS NOTES
needed for Dizziness or Nausea 90 tablet 0     No current facility-administered medications for this visit. Allergies:  Aspirin; Ativan [lorazepam]; Diltiazem; Sulfa antibiotics; Dabigatran; Lipitor [atorvastatin]; Mysoline [primidone]; Nsaids; and Other     Review of Systems:   · Constitutional: there has been no unanticipated weight loss. There's been no change in energy level, sleep pattern, or activity level. · Eyes: No visual changes or diplopia. No scleral icterus. · ENT: No Headaches, hearing loss or vertigo. No mouth sores or sore throat. · Cardiovascular: Reviewed in HPI  · Respiratory: No cough or wheezing, no sputum production. No hematemesis. · Gastrointestinal: No abdominal pain, appetite loss, blood in stools. No change in bowel or bladder habits. · Genitourinary: No dysuria, trouble voiding, or hematuria. · Musculoskeletal:  No gait disturbance, weakness or joint complaints. · Integumentary: No rash or pruritis. · Neurological: No headache, diplopia, change in muscle strength, numbness or tingling. No change in gait, balance, coordination, mood, affect, memory, mentation, behavior. · Psychiatric: No anxiety, no depression. · Endocrine: No malaise, fatigue or temperature intolerance. No excessive thirst, fluid intake, or urination. No tremor. · Hematologic/Lymphatic: No abnormal bruising or bleeding, blood clots or swollen lymph nodes. · Allergic/Immunologic: No nasal congestion or hives. Physical Examination:    Vitals:    08/04/20 1223   BP: 118/72   Temp: 98.3 °F (36.8 °C)          General appearance: alert, appears stated age, cooperative and no distress  Lungs: clear to auscultation bilaterally  Heart: regular rate and rhythm  Abdomen: soft, non-tender.  Bowel sounds normal. No masses,  no organomegaly  Extremities: extremities normal, atraumatic, no cyanosis or edema    Pulses:   L brachial 2 R brachial 2   L radial 2 R radial 2   L femoral 1 R femoral 1   L popliteal 1 R popliteal 1   L posterior tibial 0 R posterior tibial 0   L dorsalis pedis 0 R dorsalis pedis 0   Doppler Signals:  +    Neurologic: Grossly normal    MEDICAL DECISION MAKING/TESTING  I have reviewed the testing personally and my interpretation is below.        Normal bilateral lower extremity ankle brachial indices.     Patent Fem-Fem bypass without stenosis.     Patent left femoral to popliteal bypass with stenosis in the common femoral     artery just proximal to the graft.     Distal graft velocities suggestive of impending graft failure. Assessment:     Patient Active Problem List   Diagnosis    Chronic atrial fibrillation    Essential hypertension, benign    Mixed hyperlipidemia    Chronic gouty arthropathy    Acquired hypothyroidism    Arthritis    Non-rheumatic mitral regurgitation    Restrictive lung disease    Sick sinus syndrome ([facility])    Allergic rhinitis    Fibrocystic breast    [facility]vascular accident ([facility]A) [facility] [address]    HTN (hypertension), benign    Pacemaker    Primary osteoarthritis involving multiple joints    Vitamin D deficiency    Tremor    Chronic total occlusion of native coronary artery    Age related osteoporosis    Acute on chronic systolic (congestive) heart failure ([facility])    Chronic renal failure, stage 3 (moderate) ([facility])    PAD (peripheral artery disease) ([facility])    Atherosclerosis of native arteries of extremities with intermittent claudication, bilateral legs ([facility])    Idiopathic progressive neuropathy    Ischemic cardiomyopathy    Dizziness    Peripheral vertigo, bilateral    PAF (paroxysmal atrial fibrillation) ([facility])    Dyslipidemia    Heart failure ([facility])    Generalized weakness    Iron deficiency anemia    Anemia    Bilateral sensorineural hearing loss       Plan:  66-year-old female with continued bilateral lower extremity discomfort. Given her underlying renal insufficiency. We'll move forward with limited contrast angiography.   If this study is reassuring. Would recommend MRI of the lumbar spine as this may be more likely related to neurogenic claudication. Thank you for allowing me to participate in the care of this individual.  Please do not hesitate to contact me with any questions. Mariella Guillen M.D., FACS.  8/4/2020  12:24 PM

## 2020-08-05 ENCOUNTER — TELEPHONE (OUTPATIENT)
Dept: VASCULAR SURGERY | Age: 80
End: 2020-08-05

## 2020-08-05 ENCOUNTER — PREP FOR PROCEDURE (OUTPATIENT)
Dept: VASCULAR SURGERY | Age: 80
End: 2020-08-05

## 2020-08-06 ENCOUNTER — OFFICE VISIT (OUTPATIENT)
Dept: CARDIOLOGY CLINIC | Age: 80
End: 2020-08-06
Payer: MEDICARE

## 2020-08-06 VITALS
OXYGEN SATURATION: 99 % | TEMPERATURE: 97.7 F | WEIGHT: 135 LBS | HEART RATE: 69 BPM | SYSTOLIC BLOOD PRESSURE: 134 MMHG | DIASTOLIC BLOOD PRESSURE: 62 MMHG | BODY MASS INDEX: 23.92 KG/M2 | HEIGHT: 63 IN

## 2020-08-06 LAB — PRO-BNP: 2375 PG/ML (ref 0–449)

## 2020-08-06 PROCEDURE — G8420 CALC BMI NORM PARAMETERS: HCPCS | Performed by: CLINICAL NURSE SPECIALIST

## 2020-08-06 PROCEDURE — 1123F ACP DISCUSS/DSCN MKR DOCD: CPT | Performed by: CLINICAL NURSE SPECIALIST

## 2020-08-06 PROCEDURE — 4040F PNEUMOC VAC/ADMIN/RCVD: CPT | Performed by: CLINICAL NURSE SPECIALIST

## 2020-08-06 PROCEDURE — G8399 PT W/DXA RESULTS DOCUMENT: HCPCS | Performed by: CLINICAL NURSE SPECIALIST

## 2020-08-06 PROCEDURE — 1090F PRES/ABSN URINE INCON ASSESS: CPT | Performed by: CLINICAL NURSE SPECIALIST

## 2020-08-06 PROCEDURE — 1036F TOBACCO NON-USER: CPT | Performed by: CLINICAL NURSE SPECIALIST

## 2020-08-06 PROCEDURE — G8427 DOCREV CUR MEDS BY ELIG CLIN: HCPCS | Performed by: CLINICAL NURSE SPECIALIST

## 2020-08-06 PROCEDURE — 99214 OFFICE O/P EST MOD 30 MIN: CPT | Performed by: CLINICAL NURSE SPECIALIST

## 2020-08-06 RX ORDER — SODIUM CHLORIDE 0.9 % (FLUSH) 0.9 %
10 SYRINGE (ML) INJECTION EVERY 12 HOURS SCHEDULED
Status: CANCELLED | OUTPATIENT
Start: 2020-08-06

## 2020-08-06 RX ORDER — TORSEMIDE 10 MG/1
TABLET ORAL
Qty: 120 TABLET | Refills: 1 | Status: SHIPPED | OUTPATIENT
Start: 2020-08-06 | End: 2020-08-13 | Stop reason: SDUPTHER

## 2020-08-06 RX ORDER — SODIUM CHLORIDE 0.9 % (FLUSH) 0.9 %
10 SYRINGE (ML) INJECTION PRN
Status: CANCELLED | OUTPATIENT
Start: 2020-08-06

## 2020-08-06 RX ORDER — SODIUM CHLORIDE 9 MG/ML
INJECTION, SOLUTION INTRAVENOUS CONTINUOUS
Status: CANCELLED | OUTPATIENT
Start: 2020-08-06

## 2020-08-06 NOTE — PATIENT INSTRUCTIONS
1.  Stay off aldactone  2. Refill for torsemide sent to [facility]  3. Continue all current medications  4. Will add bnp to labs done on 8/4  5.   RTO in 3 months

## 2020-08-06 NOTE — PROGRESS NOTES
without mention of hemorrhage, perforation, or obstruction, Thyroid disease, Unspecified disorder of kidney and ureter, and Unspecified hypothyroidism. Surgical History:   has a past surgical history that includes back surgery; Cholecystectomy; Cardiac surgery; Coronary artery bypass graft (1987); shoulder surgery; Cardiac catheterization (7/2012); hip surgery (Left, 03/16/2017); joint replacement; Cardiac catheterization (08/07/2018); Percutaneous Transluminal Coronary Angio (11/04/2014); pr inject anes/steroid foramen lumbar/sacral w img guide ,1 level (Right, 12/3/2018); Femoral-femoral Bypass Graft (N/A, 8/22/2019); and femoral bypass (Left, 8/22/2019). Social History:   reports that she quit smoking about 33 years ago. Her smoking use included cigarettes. She has a 28.00 pack-year smoking history. She has never used smokeless tobacco. She reports previous alcohol use. She reports that she does not use drugs. Family History:   Family History   Problem Relation Age of Onset    Cancer Sister     Heart Disease Sister     Diabetes Brother     Hypertension Brother     Heart Disease Brother     Stroke Maternal Aunt     Heart Disease Maternal Aunt     Diabetes Maternal Uncle     Hypertension Paternal Aunt     Cancer Maternal Grandmother     Cancer Paternal Grandmother     Rheum Arthritis Neg Hx     Lupus Neg Hx        Home Medications:  Prior to Admission medications    Medication Sig Start Date End Date Taking?  Authorizing Provider   levothyroxine (SYNTHROID) 112 MCG tablet TAKE 1 TABLET EVERY DAY 8/4/20  Yes Patricia Terrell MD   metoprolol succinate (TOPROL XL) 100 MG extended release tablet Take 1 tablet by mouth 2 times daily 7/20/20  Yes Patricia Terrell MD   torsemide (DEMADEX) 20 MG tablet 10 mg alternating with 20 mg 7/20/20  Yes TIFFANIE Cavazos CNP   XARELTO 15 MG TABS tablet TAKE 1 TABLET DAILY WITH BREAKFAST 5/14/20  Yes TIFFANIE Meléndez CNP   allopurinol (ZYLOPRIM) 100 MG tablet Take 1 tablet by mouth daily 4/23/20  Yes Chana Jack MD   clopidogrel (PLAVIX) 75 MG tablet Take 1 tablet by mouth daily 4/20/20  Yes Chana Jack MD   topiramate (TOPAMAX) 50 MG tablet Take 1 tablet by mouth 2 times daily 3/28/20  Yes Yeyo Harp MD   allopurinol (ZYLOPRIM) 300 MG tablet Take 1 tablet by mouth daily 2/21/20  Yes TIFFANIE Jimenez CNP   tiZANidine (ZANAFLEX) 4 MG tablet Take 4 mg by mouth every 6 hours as needed   Yes Historical Provider, MD   vitamin D (ERGOCALCIFEROL) 1.25 MG (19548 UT) CAPS capsule Take 50,000 Units by mouth once a week   Yes Historical Provider, MD   meclizine (ANTIVERT) 12.5 MG tablet Take 1 tablet by mouth 3 times daily as needed for Dizziness or Nausea 7/23/19  Yes Chana Jack MD        Allergies:  Aspirin; Ativan [lorazepam]; Diltiazem; Sulfa antibiotics; Dabigatran; Lipitor [atorvastatin]; Mysoline [primidone]; Nsaids; and Other     Review of Systems:   · Constitutional: there has been no unanticipated weight loss. There's been no change in energy level, sleep pattern, or activity level. · Eyes: No visual changes or diplopia. No scleral icterus. · ENT: No Headaches, hearing loss or vertigo. No mouth sores or sore throat. · Cardiovascular: Reviewed in HPI  · Respiratory: No cough or wheezing, no sputum production. No hematemesis. · Gastrointestinal: No abdominal pain, appetite loss, blood in stools. No change in bowel or bladder habits. States she feels bloated   · Genitourinary: No dysuria, trouble voiding, or hematuria. · Musculoskeletal:  No gait disturbance, weakness or joint complaints. · Integumentary: No rash or pruritis. · Neurological: No headache, diplopia, change in muscle strength, numbness or tingling. No change in gait, balance, coordination, mood, affect, memory, mentation, behavior. · Psychiatric: No anxiety, no depression. · Endocrine: No malaise, fatigue or temperature intolerance.  No excessive thirst, 04/30/2020    K 5.2 08/04/2020    K 4.2 07/01/2020    K 4.6 04/30/2020    K 4.1 03/25/2020    K 5.6 02/07/2020    K 5.1 02/06/2020     08/04/2020     07/01/2020     04/30/2020    CO2 22 08/04/2020    CO2 23 07/01/2020    CO2 26 04/30/2020    PHOS 3.9 08/04/2020    PHOS 3.0 07/01/2020    PHOS 4.2 04/30/2020    BUN 32 08/04/2020    BUN 31 07/01/2020    BUN 45 04/30/2020    CREATININE 1.3 08/04/2020    CREATININE 1.9 04/30/2020    CREATININE 1.9 04/21/2020     BNP:   Lab Results   Component Value Date    PROBNP 2,255 04/21/2020    PROBNP 8,131 04/10/2020    PROBNP 3,073 03/28/2020     Cardiac Imaging:  Echo 4/30/2020   Left ventricular cavity size is normal. Normal left ventricular wall   thickness. Left ventricular function appears to be reduced with an estimated   ejection fraction of 45%. Diffuse hypokinesis. Echo 7/19/2019   Summary    Left ventricle - normal size, thickness, reduced function with EF of 45%  LV wall motion - diffuse hypokinesis. Mitral valve - thickened, annular calcification, moderate regurgitation  Aortic valve - thickened, calcified, mild regurgitation  Tricuspid valve - mild regurgitation with PASP of 25mmHg  Pulmonic valve - trivial regurgitation   Biatrial enlargement  Pacemaker / ICD lead is visualized in the right heart. 6/2019  NSTEMI: . Angiogram findings were as below:      Findings:                      LM       Normal              LAD     50% ostial, mid 100%              Cx        40% OM1 at bifurcation              RCA     Mid 100%              LVG     Not performed              EDP     15 mmHg              L-LAD  Patent              S-PDA Known occluded  Severe Ca++:None  Post Cath Dx:Stable CAD, no apparent culprit for NSTEMI  Intervention:  None  Med Rec:        Continue aggressive med tx                          Continue plavix, no asa due to allergy. statin added.  LDL 75   8/7/18  The PCI of complex proximal RCA chronic total occlusion was unsuccessful coronary bypass graft without angina pectoris    5. Renal insufficiency follows with Dr Trinh Cardozo  6. Peripheral vascular disease follows with Dr Aguilera Reasons:   1. Stay off aldactone  2. Refill for torsemide sent to [facility]  3. Continue all current medications  4. Will add bnp to labs done on 8/4  5. RTO in 3 months    May be able to take some extra torsemide after her angiogram is completed     Note sent to nephrology as well    I appreciate the opportunity of cooperating in the care of this individual.    WINSTON Campbell, 8/6/2020, 11:13 AM    QUALITY MEASURES  1. Tobacco Cessation Counseling: NA  2. Retake of BP if >140/90:   NA  3. Documentation to PCP/referring for new patient:  Sent to PCP at close of office visit  4. CAD patient on anti-platelet: Yes  5. CAD patient on STATIN therapy:  No did not tolerate  6.  Patient with CHF and aFib on anticoagulation:  Yes

## 2020-08-10 ENCOUNTER — OFFICE VISIT (OUTPATIENT)
Dept: PRIMARY CARE CLINIC | Age: 80
End: 2020-08-10
Payer: MEDICARE

## 2020-08-10 ENCOUNTER — TELEPHONE (OUTPATIENT)
Dept: CARDIOLOGY CLINIC | Age: 80
End: 2020-08-10

## 2020-08-10 PROCEDURE — G8428 CUR MEDS NOT DOCUMENT: HCPCS | Performed by: NURSE PRACTITIONER

## 2020-08-10 PROCEDURE — G8420 CALC BMI NORM PARAMETERS: HCPCS | Performed by: NURSE PRACTITIONER

## 2020-08-10 PROCEDURE — 99211 OFF/OP EST MAY X REQ PHY/QHP: CPT | Performed by: NURSE PRACTITIONER

## 2020-08-10 NOTE — PATIENT INSTRUCTIONS

## 2020-08-10 NOTE — PROGRESS NOTES
Shira Pink received a viral test for COVID-19. They were educated on isolation and quarantine as appropriate. For any symptoms, they were directed to seek care from their PCP, given contact information to establish with a doctor, directed to an urgent care or the emergency room.

## 2020-08-10 NOTE — TELEPHONE ENCOUNTER
Daughter states nprg wanted her to ask Dr Dominique Driver about something and she in not sure. Asking if susan could call Dominique Driver office to find out. Any questions please call.

## 2020-08-11 NOTE — TELEPHONE ENCOUNTER
There was nothing I needed to discuss with Dr Mack    She saw the kidney doc who agreed she should stay off aldactone

## 2020-08-13 RX ORDER — TORSEMIDE 10 MG/1
TABLET ORAL
Qty: 120 TABLET | Refills: 1 | Status: SHIPPED | OUTPATIENT
Start: 2020-08-13 | End: 2020-10-14

## 2020-08-13 NOTE — TELEPHONE ENCOUNTER
Pharmacy is asking for clarification for the Torsemide medication. They can not refill the medication with the current dose instructions.  I tried to fix the Sig instructions

## 2020-08-14 ENCOUNTER — HOSPITAL ENCOUNTER (OUTPATIENT)
Dept: CARDIAC CATH/INVASIVE PROCEDURES | Age: 80
Discharge: HOME | End: 2020-08-14
Attending: SURGERY | Admitting: SURGERY
Payer: MEDICARE

## 2020-08-14 VITALS — WEIGHT: 134 LBS | BODY MASS INDEX: 23.74 KG/M2 | HEIGHT: 63 IN

## 2020-08-14 LAB
POC ACT LR: 172 SEC
SARS-COV-2, NAA: NOT DETECTED

## 2020-08-14 PROCEDURE — 2500000003 HC RX 250 WO HCPCS

## 2020-08-14 PROCEDURE — C1769 GUIDE WIRE: HCPCS

## 2020-08-14 PROCEDURE — 99152 MOD SED SAME PHYS/QHP 5/>YRS: CPT

## 2020-08-14 PROCEDURE — 75716 ARTERY X-RAYS ARMS/LEGS: CPT

## 2020-08-14 PROCEDURE — 36200 PLACE CATHETER IN AORTA: CPT

## 2020-08-14 PROCEDURE — 75625 CONTRAST EXAM ABDOMINL AORTA: CPT | Performed by: SURGERY

## 2020-08-14 PROCEDURE — 6360000002 HC RX W HCPCS

## 2020-08-14 PROCEDURE — 36200 PLACE CATHETER IN AORTA: CPT | Performed by: SURGERY

## 2020-08-14 PROCEDURE — 6360000004 HC RX CONTRAST MEDICATION: Performed by: SURGERY

## 2020-08-14 PROCEDURE — 85347 COAGULATION TIME ACTIVATED: CPT

## 2020-08-14 PROCEDURE — C1894 INTRO/SHEATH, NON-LASER: HCPCS

## 2020-08-14 PROCEDURE — 75625 CONTRAST EXAM ABDOMINL AORTA: CPT

## 2020-08-14 PROCEDURE — C1887 CATHETER, GUIDING: HCPCS

## 2020-08-14 PROCEDURE — C1725 CATH, TRANSLUMIN NON-LASER: HCPCS

## 2020-08-14 PROCEDURE — 75716 ARTERY X-RAYS ARMS/LEGS: CPT | Performed by: SURGERY

## 2020-08-14 RX ORDER — IODIXANOL 320 MG/ML
35 INJECTION, SOLUTION INTRAVASCULAR ONCE
Status: COMPLETED | OUTPATIENT
Start: 2020-08-14 | End: 2020-08-14

## 2020-08-14 RX ADMIN — IODIXANOL 35 ML: 320 INJECTION, SOLUTION INTRAVASCULAR at 13:55

## 2020-08-14 NOTE — OP NOTE
[address]                     [address] [facility], [address] [facility]                                OPERATIVE REPORT    PATIENT NAME: Jessica Loomis                   :        1940  MED REC NO:   [id number]                          ROOM:  ACCOUNT NO:   [de-identified]                           ADMIT DATE: 2020  PROVIDER:     Andrew Mcpherson MD    DATE OF PROCEDURE:  2020    PREPROCEDURE DIAGNOSIS:  Bilateral lower extremity claudication. POSTPROCEDURE DIAGNOSIS:  Bilateral lower extremity claudication. PROCEDURE:  1. Ultrasound-guided retrograde right brachial artery access. 2.  Abdominal aortogram with bilateral lower extremity runoff. SURGEON:  Andrew Mcpherson MD    ANESTHESIA:  Local/IV. ESTIMATED BLOOD LOSS:  Minimal.    COMPLICATIONS:  None. INDICATION:  The patient is a 24-year-old female with a history of a  left-to-right femoral-femoral bypass and left femoral to popliteal  intervention in the past.  She was noted to have increasing velocities  concerning for stenosis. However, given her underlying renal function,  CT angiogram discussion was had with the patient and she was concerned  about the contrast exposure and as a result to minimize contrast  exposure presents for formal angiogram for further evaluation. All  risks, benefits and alternatives were discussed in detail. All  questions were answered. PROCEDURE:  After witnessed informed consent was obtained, the patient  was brought to the cath lab where IV sedation was administered. Her  right arm was carefully prepped and draped. Ultrasound was used to  identify the right brachial artery. An image was saved to the patient's  permanent medical record. Under direct visualization, it was accessed  with a 4-Hungarian micropuncture needle. Bentson wire was introduced and a  5-Hungarian sheath was placed.   A pigtail catheter was inserted to the  level of the renal arteries and an abdominal aortogram was performed. It was then advanced to the level of the aortic bifurcation. A  bilateral lower extremity runoff was performed. It revealed a widely  patent fem-pop and fem-fem bypass. The procedure was terminated at this  point. The sheath was removed. Manual pressure was held. She  tolerated the procedure well and was transferred to recovery room in  stable condition. FINDINGS:  1. Abdominal aortogram:  Right and left renal are patent. Infrarenal  abdominal aorta shows approximately 20% mid infrarenal abdominal aortic  stenosis. The right common and internal are patent. The external iliac  on the right is occluded. The left common and external are patent. 2.  Right lower extremity runoff:  Right common femoral, profunda and  SFA are patent. There is a patent bypass from the left side seen  entering into the right distal common femoral artery and the popliteal  is patent. 3.  Left lower extremity runoff:  Left common femoral and profunda are  patent. SFA is occluded. There is a patent [id number] bypass  and a left femoral to popliteal bypass with no stenosis either at the  proximal or distal anastomosis. PLAN:  The patient with widely patent peripheral interventions. Continue current medical regimen. Would recommend followup spinal  evaluation.         Paolo Lanza MD    D: 08/14/2020 13:46:58       T: 08/14/2020 13:56:29     [email or web address]  Job#: [id number]     Doc#: [id number]    CC:

## 2020-08-14 NOTE — H&P
H&P Update    I have reviewed the history and physical and examined the patient and find no relevant changes. I have reviewed with the patient and/or family the risks, benefits, and alternatives to the procedure. Pre-sedation Assessment    Patient:  Natasha Aase   :   1940  Intended Procedure:  Abdominal aortogram with lower extremity evaluation and possible intervention. Glenda Ring nurses notes reviewed and agreed. Medications reviewed  Allergies:    Allergies   Allergen Reactions    Aspirin Nausea Only    Ativan [Lorazepam] Other (See Comments)     hallucinations    Diltiazem Anaphylaxis    Sulfa Antibiotics Rash and Hives    Dabigatran Nausea Only     And indigestion  And indigestion    Aka Pradaxa    Lipitor [Atorvastatin] Other (See Comments)     Muscle pains    Mysoline [Primidone]     Nsaids     Other Other (See Comments)     Nitroglycerin patches causes severe headaches         Pre-Procedure Assessment/Plan:  ASA 3 - Patient with moderate systemic disease with functional limitations  Malampati 3    Level of Sedation Plan:Mild sedation    Post Procedure plan: Return to same level of care      Signed:  [address]isa Vu, 2020, 12:56 PM

## 2020-08-17 ENCOUNTER — TELEPHONE (OUTPATIENT)
Dept: VASCULAR SURGERY | Age: 80
End: 2020-08-17

## 2020-08-17 ENCOUNTER — HOSPITAL ENCOUNTER (OUTPATIENT)
Dept: VASCULAR LAB | Age: 80
Discharge: HOME | End: 2020-08-17
Payer: MEDICARE

## 2020-08-17 PROCEDURE — 93931 UPPER EXTREMITY STUDY: CPT

## 2020-08-17 NOTE — TELEPHONE ENCOUNTER
Patient experiencing right arm pain. Left side painful under breast when she breathes. Per Dr Sehrice Segundo he will order arterial duplex right arm to rule out pseudoaneurysm.  If left side pain continues he will order CT Chest.

## 2020-08-18 ENCOUNTER — HOSPITAL ENCOUNTER (OUTPATIENT)
Age: 80
Discharge: HOME | End: 2020-08-18
Payer: MEDICARE

## 2020-08-18 ENCOUNTER — OFFICE VISIT (OUTPATIENT)
Dept: FAMILY MEDICINE CLINIC | Age: 80
End: 2020-08-18
Payer: MEDICARE

## 2020-08-18 ENCOUNTER — TELEPHONE (OUTPATIENT)
Dept: VASCULAR SURGERY | Age: 80
End: 2020-08-18

## 2020-08-18 ENCOUNTER — HOSPITAL ENCOUNTER (OUTPATIENT)
Dept: GENERAL RADIOLOGY | Age: 80
Discharge: HOME | End: 2020-08-18
Payer: MEDICARE

## 2020-08-18 VITALS
SYSTOLIC BLOOD PRESSURE: 130 MMHG | BODY MASS INDEX: 24 KG/M2 | RESPIRATION RATE: 12 BRPM | OXYGEN SATURATION: 93 % | DIASTOLIC BLOOD PRESSURE: 70 MMHG | WEIGHT: 135.5 LBS | HEART RATE: 71 BPM | TEMPERATURE: 97.3 F

## 2020-08-18 PROCEDURE — 1036F TOBACCO NON-USER: CPT | Performed by: FAMILY MEDICINE

## 2020-08-18 PROCEDURE — G8420 CALC BMI NORM PARAMETERS: HCPCS | Performed by: FAMILY MEDICINE

## 2020-08-18 PROCEDURE — G8428 CUR MEDS NOT DOCUMENT: HCPCS | Performed by: FAMILY MEDICINE

## 2020-08-18 PROCEDURE — 71046 X-RAY EXAM CHEST 2 VIEWS: CPT

## 2020-08-18 PROCEDURE — 1123F ACP DISCUSS/DSCN MKR DOCD: CPT | Performed by: FAMILY MEDICINE

## 2020-08-18 PROCEDURE — 1090F PRES/ABSN URINE INCON ASSESS: CPT | Performed by: FAMILY MEDICINE

## 2020-08-18 PROCEDURE — G8399 PT W/DXA RESULTS DOCUMENT: HCPCS | Performed by: FAMILY MEDICINE

## 2020-08-18 PROCEDURE — 99213 OFFICE O/P EST LOW 20 MIN: CPT | Performed by: FAMILY MEDICINE

## 2020-08-18 PROCEDURE — 4040F PNEUMOC VAC/ADMIN/RCVD: CPT | Performed by: FAMILY MEDICINE

## 2020-08-18 RX ORDER — PREDNISONE 20 MG/1
TABLET ORAL
Qty: 20 TABLET | Refills: 0 | Status: SHIPPED | OUTPATIENT
Start: 2020-08-18 | End: 2020-09-08

## 2020-08-18 NOTE — PROGRESS NOTES
Subjective:      Patient ID: Ming Calvo is a 78 y.o. female. CC: Patient presents for acute medical problem-chest pain. Medical assistant notes reviewed. Flank Pain   This is a new problem. Episode onset: a week  The problem occurs constantly. The problem has been gradually worsening since onset. Pain location: left side. The quality of the pain is described as aching (hurts to breath). The pain does not radiate. The pain is at a severity of 10/10. The pain is severe. The pain is the same all the time. The symptoms are aggravated by position. She has tried nothing for the symptoms. Pt presents with sudden onset of chest pain. She had vascular procedures performed last week and she was concerned that some internal damage happened. She described a pleuritic type chest pain on the left side of her chest.All the discomfort is on the left side of her chest.  She does have some discomfort into the left scapular area as well. She denies any coughing or shortness of breath symptoms. Review of Systems   Genitourinary: Positive for flank pain. Allergies   Allergen Reactions    Aspirin Nausea Only    Ativan [Lorazepam] Other (See Comments)     hallucinations    Diltiazem Anaphylaxis    Sulfa Antibiotics Rash and Hives    Dabigatran Nausea Only     And indigestion  And indigestion    Aka Pradaxa    Lipitor [Atorvastatin] Other (See Comments)     Muscle pains    Mysoline [Primidone]     Nsaids     Other Other (See Comments)     Nitroglycerin patches causes severe headaches     Objective:   Physical Exam  Vitals signs and nursing note reviewed. Constitutional:       General: She is not in acute distress. Appearance: She is well-developed. Cardiovascular:      Rate and Rhythm: Normal rate. Rhythm irregular. Heart sounds: Normal heart sounds. Pulmonary:      Effort: Pulmonary effort is normal. No respiratory distress. Breath sounds: Normal breath sounds. No wheezing, rhonchi or rales. Chest:      Chest wall: Tenderness (Left side of her chest with some discomfort into the scapular area) present. Abdominal:      General: Bowel sounds are normal. There is no distension. Palpations: Abdomen is soft. Tenderness: There is no abdominal tenderness. There is no right CVA tenderness or left CVA tenderness. Skin:     General: Skin is warm. Findings: No rash. Neurological:      Mental Status: She is alert. Psychiatric:         Behavior: Behavior is cooperative. Assessment:      Tyler Mccauley was seen today for flank pain. Diagnoses and all orders for this visit:    Chest pain on breathing  -     XR CHEST STANDARD (2 VW); Future    Neuritis    Other orders  -     predniSONE (DELTASONE) 20 MG tablet; 1 TID for 4 day then 1 BID            Plan:      Chest x-ray is ordered      For chest x-ray is normal this is probably with neuritis symptom and patient is in agreement to start prednisone medication. She was told to call back if she would break out in a rash as this could be early shingles. She would require antiviral medications at that time. RTC at normal appointment time    Please note that this chart was generated using Dragon dictation software. Although every effort was made to ensure the accuracy of this automated transcription, some errors in transcription may have occurred.

## 2020-08-18 NOTE — TELEPHONE ENCOUNTER
Left message regarding results of arterial duplex right upper extremity which shows no evidence of pseudoaneurysm of the right brachial artery. Follow up with Dr. Mercedes Guerrero as previously scheduled.      Electronically signed by TIFFANIE Lobo CNP on 8/18/2020 at 3:07 PM

## 2020-08-18 NOTE — PATIENT INSTRUCTIONS
Patient Education        Shingles: Care Instructions  Your Care Instructions     Shingles (herpes zoster) causes pain and a blistered rash. The rash can appear anywhere on the body but will be on only one side of the body, the left or right. It will be in a band, a strip, or a small area. The pain can be very severe. Shingles can also cause tingling or itching in the area of the rash. The blisters scab over after a few days and heal in 2 to 4 weeks. Medicines can help you feel better and may help prevent more serious problems caused by shingles. Shingles is caused by the same virus that causes chickenpox. When you have chickenpox, the virus gets into your nerve roots and stays there (becomes dormant) long after you get over the chickenpox. If the virus becomes active again, it can cause shingles. Follow-up care is a key part of your treatment and safety. Be sure to make and go to all appointments, and call your doctor if you are having problems. It's also a good idea to know your test results and keep a list of the medicines you take. How can you care for yourself at home? · Be safe with medicines. Take your medicines exactly as prescribed. Call your doctor if you think you are having a problem with your medicine. Antiviral medicine helps you get better faster. · Try not to scratch or pick at the blisters. They will crust over and fall off on their own if you leave them alone. · Put cool, wet cloths on the area to relieve pain and itching. You can also use calamine lotion. Try not to use so much lotion that it cakes and is hard to get off. · Put cornstarch or baking soda on the sores to help dry them out so they heal faster. · Do not use thick ointment, such as petroleum jelly, on the sores. This will keep them from drying and healing. · To help remove loose crusts, soak them in tap water. This can help decrease oozing, and dry and soothe the skin.   · Take an over-the-counter pain medicine, such as acetaminophen (Tylenol), ibuprofen (Advil, Motrin), or naproxen (Aleve). Read and follow all instructions on the label. · Avoid close contact with people until the blisters have healed. It is very important for you to avoid contact with anyone who has never had chickenpox or the chickenpox vaccine. Pregnant women, young babies, and anyone else who has a hard time fighting infection (such as someone with HIV, diabetes, or cancer) is especially at risk. When should you call for help? Call your doctor now or seek immediate medical care if:  · You have a new or higher fever. · You have a severe headache and a stiff neck. · You lose the ability to think clearly. · The rash spreads to your forehead, nose, eyes, or eyelids. · You have eye pain, or your vision gets worse. · You have new pain in your face, or you cannot move the muscles in your face. · Blisters spread to new parts of your body. Watch closely for changes in your health, and be sure to contact your doctor if:  · The rash has not healed after 2 to 4 weeks. · You still have pain after the rash has healed. Where can you learn more? Go to https://[employer]. org and sign in to your [employer] account. Melissa López in the [facility] box to learn more about \"Shingles: Care Instructions. \"     If you do not have an account, please click on the \"Sign Up Now\" link. Current as of: February 11, 2020               Content Version: 12.5  © 2006-2020 [employer]wise, Incorporated. Care instructions adapted under license by [facility] ([facility]). If you have questions about a medical condition or this instruction, always ask your healthcare professional. [address] any warranty or liability for your use of this information.

## 2020-08-24 ENCOUNTER — HOSPITAL ENCOUNTER (OUTPATIENT)
Dept: CT IMAGING | Age: 80
Discharge: HOME | End: 2020-08-24
Payer: MEDICARE

## 2020-08-24 ENCOUNTER — TELEPHONE (OUTPATIENT)
Dept: FAMILY MEDICINE CLINIC | Age: 80
End: 2020-08-24

## 2020-08-24 ENCOUNTER — TELEPHONE (OUTPATIENT)
Dept: CARDIOLOGY CLINIC | Age: 80
End: 2020-08-24

## 2020-08-24 PROCEDURE — 71250 CT THORAX DX C-: CPT

## 2020-08-24 NOTE — TELEPHONE ENCOUNTER
Patient is having back pain and advised we need to make her an appt, will put her in tomorrow. She wants to see Dr. Shmuel Mabry.

## 2020-08-24 NOTE — TELEPHONE ENCOUNTER
Pt states she had CT today and it showed plaque and fluid build up on left side. Dr Beatriz Kemp said it is all cardiac related. Pt wanting to speak to [address] [place] [address]. Pt has been having sob over last couple of weeks that comes and goes. Please call to discuss.

## 2020-08-24 NOTE — TELEPHONE ENCOUNTER
Patient had a uti last week and went to Urgent care. She was treated with Ciprofloxacin 250mg bid x 5 days. Patient is still having throbbing lower back pain and some urinary pressure. Patient feels like she needs another round of the antibiotic to help. Please advise,    Ino Winchester Dr

## 2020-08-24 NOTE — TELEPHONE ENCOUNTER
----- Message from [address] sent at 8/24/2020 11:09 AM EDT -----  Subject: Message to Provider    QUESTIONS  Information for Provider? The pt is requesting another order of her   antibiotic   citroflaxacin 250mg   ---------------------------------------------------------------------------  --------------  CALL BACK INFO  What is the best way for the office to contact you? OK to leave message on   voicemail  Preferred Call Back Phone Number? [phone]  ---------------------------------------------------------------------------  --------------  SCRIPT ANSWERS  Relationship to Patient? Other  Representative Name? Greg louis  Is the Representative on the appropriate HIPAA document in Epic?  Yes

## 2020-08-25 ENCOUNTER — TELEPHONE (OUTPATIENT)
Dept: VASCULAR SURGERY | Age: 80
End: 2020-08-25

## 2020-08-25 ENCOUNTER — OFFICE VISIT (OUTPATIENT)
Dept: FAMILY MEDICINE CLINIC | Age: 80
End: 2020-08-25
Payer: MEDICARE

## 2020-08-25 VITALS
RESPIRATION RATE: 12 BRPM | WEIGHT: 135.25 LBS | TEMPERATURE: 97.7 F | DIASTOLIC BLOOD PRESSURE: 60 MMHG | SYSTOLIC BLOOD PRESSURE: 118 MMHG | BODY MASS INDEX: 23.96 KG/M2 | HEART RATE: 71 BPM | OXYGEN SATURATION: 97 %

## 2020-08-25 LAB
BACTERIA URINE, POC: NORMAL
BILIRUBIN URINE: 0 MG/DL
BLOOD, URINE: NEGATIVE
CASTS URINE, POC: NORMAL
CLARITY: CLEAR
COLOR: YELLOW
CRYSTALS URINE, POC: NORMAL
EPI CELLS URINE, POC: NORMAL
GLUCOSE URINE: NORMAL
KETONES, URINE: NEGATIVE
LEUKOCYTE EST, POC: NORMAL
NITRITE, URINE: NEGATIVE
PH UA: 5 (ref 4.5–8)
PROTEIN UA: NEGATIVE
RBC URINE, POC: NORMAL
SPECIFIC GRAVITY UA: 1.01 (ref 1–1.03)
UROBILINOGEN, URINE: NORMAL
WBC URINE, POC: NORMAL
YEAST URINE, POC: NORMAL

## 2020-08-25 PROCEDURE — G8427 DOCREV CUR MEDS BY ELIG CLIN: HCPCS | Performed by: FAMILY MEDICINE

## 2020-08-25 PROCEDURE — 1123F ACP DISCUSS/DSCN MKR DOCD: CPT | Performed by: FAMILY MEDICINE

## 2020-08-25 PROCEDURE — 1090F PRES/ABSN URINE INCON ASSESS: CPT | Performed by: FAMILY MEDICINE

## 2020-08-25 PROCEDURE — 81000 URINALYSIS NONAUTO W/SCOPE: CPT | Performed by: FAMILY MEDICINE

## 2020-08-25 PROCEDURE — 99213 OFFICE O/P EST LOW 20 MIN: CPT | Performed by: FAMILY MEDICINE

## 2020-08-25 PROCEDURE — G8420 CALC BMI NORM PARAMETERS: HCPCS | Performed by: FAMILY MEDICINE

## 2020-08-25 PROCEDURE — 4040F PNEUMOC VAC/ADMIN/RCVD: CPT | Performed by: FAMILY MEDICINE

## 2020-08-25 PROCEDURE — 1036F TOBACCO NON-USER: CPT | Performed by: FAMILY MEDICINE

## 2020-08-25 PROCEDURE — G8399 PT W/DXA RESULTS DOCUMENT: HCPCS | Performed by: FAMILY MEDICINE

## 2020-08-25 NOTE — PATIENT INSTRUCTIONS
lactaid pills   Patient Education        Lactose Intolerance: Care Instructions  Your Care Instructions     Lactose is sugar that is found in milk and milk products. Some people do not make enough of an enzyme called lactase, which digests lactose. When this happens it can cause gas, belly pain, diarrhea, and bloating. This is called lactose intolerance. This is not the same as food allergy to milk. Lactose intolerance affects different people in different ways. Some people cannot digest any milk products. Other people can eat or drink small amounts of milk products or certain types of milk products without problems. You can learn how to avoid discomfort and still get enough calcium to maintain healthy bones. Follow-up care is a key part of your treatment and safety. Be sure to make and go to all appointments, and call your doctor if you are having problems. It's also a good idea to know your test results and keep a list of the medicines you take. How can you care for yourself at home? · Limit the amount of milk and milk products in your diet. Try to drink 1 glass of milk each day. Drink small amounts several times a day. All types of milk contain the same amount of lactose. If you are not sure whether a milk product causes symptoms, try a small amount and wait to see how you feel before you eat or drink more. · Eat or drink milk and milk products along with other foods. For some people, combining a solid food (like cereal) with a dairy product (like milk) can reduce symptoms. · Eat small amounts of milk products throughout the day instead of larger amounts all at once. · Eat or drink milk and milk products that have reduced lactose. In most grocery stores, you can buy milk with reduced lactose, such as Lactaid milk. · Eat or drink other foods instead of milk and milk products. Try soy milk and soy cheese, and use nondairy creamers in your coffee.  Keep in mind that nondairy creamers may contain more fat than milk.  · Use lactase products. These are dietary supplements that help you digest lactose. Some are pills that you chew (such as Lactaid) before you eat or drink milk products. Others are liquids that you add to milk 24 hours before you drink it. Try a few products and brands to see which ones work best for you. · Some people who are lactose-intolerant can eat some kinds of yogurt without problems, especially yogurt with live cultures. It's best to try a small amount of different brands of yogurt to see which ones work best for you. · Watch out for lactose in foods you buy. Some prepared foods contain lactose, including breads and baked goods, breakfast cereals, instant potatoes and soups, margarine, salad dressings, and many snacks. Be sure to read labels for lactose and for lactose's \"hidden\" names. These include dry milk solids, whey, curds, milk by-products, and nonfat dry milk powder. · Be sure to get enough calcium in your diet, especially if you avoid milk products completely. To get enough calcium, you would need to eat calcium-rich foods as often as someone would drink milk. Calcium is very important because it keeps bones strong and reduces the risk of osteoporosis. Ask your dietitian for advice on how to get enough calcium. Foods that have calcium include:  ? Broccoli, bok haim, kale, and pinky, mustard, and turnip greens. ? Canned sardines and other small fish that have bones you can eat. ? Calcium-fortified orange juice. ? Soy products such as fortified soy milk and tofu. ? Almonds. ? Dried beans. · If you are worried about getting enough nutrients, ask your doctor about taking supplements, such as calcium and vitamin D. When should you call for help? Call your doctor now or seek immediate medical care if:  · You have new or worse belly pain. Watch closely for changes in your health, and be sure to contact your doctor if:  · You do not get better as expected.   Where can you learn

## 2020-08-25 NOTE — PROGRESS NOTES
Subjective:      Patient ID: Bonita Dennis is a 78 y.o. female. CC: Patient presents for acute medical problem-lower back pain, possible UTI and chest pain. Medical assistant notes reviewed. HPI Pt os here for a follow up after being seen at an Urgent Care on 8/17/20 due to having frequent urination, throbbing back pain. Pt was dx with a UTI. Pt stated back feels better now and urinary frequency is gone too. Patient was then succumbing of daughter. She is having more memory issues. She was having a lot of lower back pain thought she had a UTI. She feels this is improved. She also under review of her CT scans that which she had yesterday with left-sided chest pain which also seems to be improving.     Review of Systems  Patient Active Problem List   Diagnosis    Chronic atrial fibrillation    Essential hypertension, benign    Mixed hyperlipidemia    Chronic gouty arthropathy    Acquired hypothyroidism    Arthritis    Non-rheumatic mitral regurgitation    Restrictive lung disease    Sick sinus syndrome (HCC)    Allergic rhinitis    Fibrocystic breast    [facility]A) [facility] [address]    HTN (hypertension), benign    Pacemaker    Primary osteoarthritis involving multiple joints    Vitamin D deficiency    Tremor    Chronic total occlusion of native coronary artery    Age related osteoporosis    Acute on chronic systolic (congestive) heart failure (HCC)    Chronic renal failure, stage 3 (moderate) ([facility])    PAD (peripheral artery disease) ([facility])    Atherosclerosis of native arteries of extremities with intermittent claudication, bilateral legs ([facility])    Idiopathic progressive neuropathy    Ischemic cardiomyopathy    Dizziness    Peripheral vertigo, bilateral    PAF (paroxysmal atrial fibrillation) ([facility])    Dyslipidemia    Heart failure ([facility])    Generalized weakness    Iron deficiency anemia    Anemia    Bilateral sensorineural hearing loss       Outpatient Medications Marked as Taking for the 20 encounter (Office Visit) with Chaitanya Tavares MD   Medication Sig Dispense Refill    predniSONE (DELTASONE) 20 MG tablet 1 TID for 4 day then 1 BID 20 tablet 0    torsemide (DEMADEX) 10 MG tablet 10 mg alternating with 20 mg 120 tablet 1    levothyroxine (SYNTHROID) 112 MCG tablet TAKE 1 TABLET EVERY DAY 90 tablet 0    metoprolol succinate (TOPROL XL) 100 MG extended release tablet Take 1 tablet by mouth 2 times daily 180 tablet 1    XARELTO 15 MG TABS tablet TAKE 1 TABLET DAILY WITH BREAKFAST 90 tablet 3    allopurinol (ZYLOPRIM) 100 MG tablet Take 1 tablet by mouth daily 90 tablet 1    clopidogrel (PLAVIX) 75 MG tablet Take 1 tablet by mouth daily 90 tablet 2    topiramate (TOPAMAX) 50 MG tablet Take 1 tablet by mouth 2 times daily 60 tablet 3    allopurinol (ZYLOPRIM) 300 MG tablet Take 1 tablet by mouth daily 90 tablet 1    tiZANidine (ZANAFLEX) 4 MG tablet Take 4 mg by mouth every 6 hours as needed      vitamin D (ERGOCALCIFEROL) 1.25 MG (57735 UT) CAPS capsule Take 50,000 Units by mouth once a week      meclizine (ANTIVERT) 12.5 MG tablet Take 1 tablet by mouth 3 times daily as needed for Dizziness or Nausea 90 tablet 0       Allergies   Allergen Reactions    Aspirin Nausea Only    Ativan [Lorazepam] Other (See Comments)     hallucinations    Diltiazem Anaphylaxis    Sulfa Antibiotics Rash and Hives    Dabigatran Nausea Only     And indigestion  And indigestion    Aka Pradaxa    Lipitor [Atorvastatin] Other (See Comments)     Muscle pains    Mysoline [Primidone]     Nsaids     Other Other (See Comments)     Nitroglycerin patches causes severe headaches       Social History     Tobacco Use    Smoking status: Former Smoker     Packs/day: 1.00     Years: 28.00     Pack years: 28.00     Types: Cigarettes     Last attempt to quit: 1987     Years since quittin.6    Smokeless tobacco: Never Used    Tobacco comment: H.O.smoking at age 15 / smoked up to 1 p.p.d / quit 1987   Substance Use Topics    Alcohol use: Not Currently     Alcohol/week: 0.0 standard drinks       /60 (Site: Left Upper Arm, Position: Sitting, Cuff Size: Medium Adult)   Pulse 71   Temp 97.7 °F (36.5 °C) (Infrared)   Resp 12   Wt 135 lb 4 oz (61.3 kg)   SpO2 97%   BMI 23.96 kg/m²     Objective:   Physical Exam  Vitals signs and nursing note reviewed. Constitutional:       General: She is not in acute distress. Appearance: Normal appearance. She is well-developed. Cardiovascular:      Rate and Rhythm: Normal rate. Rhythm irregular. Heart sounds: Normal heart sounds. Pulmonary:      Effort: Pulmonary effort is normal. No respiratory distress. Breath sounds: Normal breath sounds. No wheezing, rhonchi or rales. Chest:      Chest wall: Tenderness (Left side of her chest with some discomfort into the scapular area) present. Abdominal:      General: Bowel sounds are normal. There is no distension. Palpations: Abdomen is soft. Abdomen is not rigid. There is no hepatomegaly, splenomegaly or mass. Tenderness: There is no abdominal tenderness. There is no right CVA tenderness, left CVA tenderness, guarding or rebound. Hernia: No hernia is present. Musculoskeletal:      Thoracic back: She exhibits normal range of motion, no tenderness and no deformity. Lumbar back: She exhibits normal range of motion, no tenderness and no deformity. Skin:     General: Skin is warm. Findings: No rash. Neurological:      Mental Status: She is alert. Mental status is at baseline. Psychiatric:         Behavior: Behavior is cooperative.          Assessment:      Zaheer Hill was seen today for follow-up from hospital.    Diagnoses and all orders for this visit:    Acute low back pain without sciatica, unspecified back pain laterality    Urinary tract infection without hematuria, site unspecified  -     POC URINE with Microscopic    Chest pain on breathing Plan:      I reviewed the CT scan with patient and daughter demonstrating chronic findings but no acute abnormalities. She may continue using Tylenol on a as needed basis and local heat or ice. No change of other medications. Keep RTC appointment in September      Please note that this chart was generated using Dragon dictation software. Although every effort was made to ensure the accuracy of this automated transcription, some errors in transcription may have occurred.

## 2020-08-27 ENCOUNTER — OFFICE VISIT (OUTPATIENT)
Dept: CARDIOLOGY CLINIC | Age: 80
End: 2020-08-27
Payer: MEDICARE

## 2020-08-27 VITALS
SYSTOLIC BLOOD PRESSURE: 110 MMHG | HEART RATE: 70 BPM | BODY MASS INDEX: 23.76 KG/M2 | RESPIRATION RATE: 18 BRPM | DIASTOLIC BLOOD PRESSURE: 78 MMHG | HEIGHT: 63 IN | OXYGEN SATURATION: 98 % | WEIGHT: 134.1 LBS

## 2020-08-27 PROCEDURE — G8399 PT W/DXA RESULTS DOCUMENT: HCPCS | Performed by: CLINICAL NURSE SPECIALIST

## 2020-08-27 PROCEDURE — 1036F TOBACCO NON-USER: CPT | Performed by: CLINICAL NURSE SPECIALIST

## 2020-08-27 PROCEDURE — G8420 CALC BMI NORM PARAMETERS: HCPCS | Performed by: CLINICAL NURSE SPECIALIST

## 2020-08-27 PROCEDURE — G8427 DOCREV CUR MEDS BY ELIG CLIN: HCPCS | Performed by: CLINICAL NURSE SPECIALIST

## 2020-08-27 PROCEDURE — 99214 OFFICE O/P EST MOD 30 MIN: CPT | Performed by: CLINICAL NURSE SPECIALIST

## 2020-08-27 PROCEDURE — 4040F PNEUMOC VAC/ADMIN/RCVD: CPT | Performed by: CLINICAL NURSE SPECIALIST

## 2020-08-27 PROCEDURE — 1090F PRES/ABSN URINE INCON ASSESS: CPT | Performed by: CLINICAL NURSE SPECIALIST

## 2020-08-27 PROCEDURE — 1123F ACP DISCUSS/DSCN MKR DOCD: CPT | Performed by: CLINICAL NURSE SPECIALIST

## 2020-08-27 NOTE — PROGRESS NOTES
[facility]  Progress Note    Primary Care Doctor:  Maycol Chatman MD    Chief Complaint   Patient presents with    Follow-up     Dizzyiness     Hypertension    Atrial Fibrillation        History of Present Illness:  78 y.o. female with history of Ms. Dia Chew She has a history of atrial fibrillation(on xarelto), TIA, chronic kidney disease, CHF, hypertension, hyperlipidemia, mitral valve disease, hypothyroidism. Cj Helms last LVEF was 45% on 7/19/19. CABG, 7/08 cath- patent LIMA to LAD, SVG occluded to RCA; 8/2019 left to right fem-fem bypass and left fem to above knee popliteal bypass    I had the pleasure of seeing Renetta Blanco in follow up for sHF. She is ambulatory and with her daughter today. She is in for an earlier appointment as she had a CT of her chest done (Dr Mohit Shoemaker) and she was concerned about the results. She states that her breathing is fine and she feels that the torsemide dosing is keeping fluid under control. She states that when she was very little she had traumatic lung injury. She denies any chest pain, palpitations, leg edema or sob. Her only complaint is dizziness which she is scheduled to see neurology.     Past Medical History:   has a past medical history of Allergic rhinitis, cause unspecified, Arthritis, Atrial fibrillation ([address] Bronchopneumonia, CAD (coronary artery disease), Cerebral artery occlusion with cerebral infarction ([address] Chronic gouty arthropathy, Chronic kidney disease, Congestive heart failure, unspecified, Degeneration of cervical intervertebral disc, Essential and other specified forms of tremor, Gout, HIGH CHOLESTEROL, History of CVA (cerebrovascular accident), Hx of blood clots, Hypertension, Influenza, Mitral valve stenosis and aortic valve insufficiency, Movement disorder, Pacemaker, Peptic ulcer, unspecified site, unspecified as acute or chronic, without mention of hemorrhage, perforation, or obstruction, Thyroid disease, Unspecified disorder of kidney and ureter, and Unspecified hypothyroidism. Surgical History:   has a past surgical history that includes back surgery; Cholecystectomy; Cardiac surgery; Coronary artery bypass graft (1987); shoulder surgery; Cardiac catheterization (7/2012); hip surgery (Left, 03/16/2017); joint replacement; Cardiac catheterization (08/07/2018); Percutaneous Transluminal Coronary Angio (11/04/2014); pr inject anes/steroid foramen lumbar/sacral w img guide ,1 level (Right, 12/3/2018); Femoral-femoral Bypass Graft (N/A, 8/22/2019); and femoral bypass (Left, 8/22/2019). Social History:   reports that she quit smoking about 33 years ago. Her smoking use included cigarettes. She has a 28.00 pack-year smoking history. She has never used smokeless tobacco. She reports previous alcohol use. She reports that she does not use drugs. Family History:   Family History   Problem Relation Age of Onset    Cancer Sister     Heart Disease Sister     Diabetes Brother     Hypertension Brother     Heart Disease Brother     Stroke Maternal Aunt     Heart Disease Maternal Aunt     Diabetes Maternal Uncle     Hypertension Paternal Aunt     Cancer Maternal Grandmother     Cancer Paternal Grandmother     Rheum Arthritis Neg Hx     Lupus Neg Hx        Home Medications:  Prior to Admission medications    Medication Sig Start Date End Date Taking?  Authorizing Provider   predniSONE (DELTASONE) 20 MG tablet 1 TID for 4 day then 1 BID 8/18/20  Yes Yung Ruano MD   torsemide (DEMADEX) 10 MG tablet 10 mg alternating with 20 mg 8/13/20  Yes Nathaniel Pancoast APRN - CNS   levothyroxine (SYNTHROID) 112 MCG tablet TAKE 1 TABLET EVERY DAY 8/4/20  Yes Yung Ruano MD   metoprolol succinate (TOPROL XL) 100 MG extended release tablet Take 1 tablet by mouth 2 times daily 7/20/20  Yes Yung Ruano MD   XARELTO 15 MG TABS tablet TAKE 1 TABLET DAILY WITH BREAKFAST 5/14/20  Yes Jony Friend APRN - CNP   allopurinol (ZYLOPRIM) 100 MG tablet Take 1 tablet by mouth daily 4/23/20  Yes Itz Haider MD   clopidogrel (PLAVIX) 75 MG tablet Take 1 tablet by mouth daily 4/20/20  Yes Itz Haider MD   topiramate (TOPAMAX) 50 MG tablet Take 1 tablet by mouth 2 times daily 3/28/20  Yes Yeyo Muro MD   allopurinol (ZYLOPRIM) 300 MG tablet Take 1 tablet by mouth daily 2/21/20  Yes TIFFANIE Carballo CNP   tiZANidine (ZANAFLEX) 4 MG tablet Take 4 mg by mouth every 6 hours as needed   Yes Historical Provider, MD   vitamin D (ERGOCALCIFEROL) 1.25 MG (31761 UT) CAPS capsule Take 50,000 Units by mouth once a week   Yes Historical Provider, MD   meclizine (ANTIVERT) 12.5 MG tablet Take 1 tablet by mouth 3 times daily as needed for Dizziness or Nausea 7/23/19  Yes Itz Haider MD        Allergies:  Aspirin; Ativan [lorazepam]; Diltiazem; Sulfa antibiotics; Dabigatran; Lipitor [atorvastatin]; Mysoline [primidone]; Nsaids; and Other     Review of Systems:   · Constitutional: there has been no unanticipated weight loss. There's been no change in energy level, sleep pattern, or activity level. · Eyes: No visual changes or diplopia. No scleral icterus. · ENT: No Headaches, hearing loss or vertigo. No mouth sores or sore throat. · Cardiovascular: Reviewed in HPI  · Respiratory: No cough or wheezing, no sputum production. No hematemesis. · Gastrointestinal: No abdominal pain, appetite loss, blood in stools. No change in bowel or bladder habits. States she feels bloated   · Genitourinary: No dysuria, trouble voiding, or hematuria. · Musculoskeletal:  No gait disturbance, weakness or joint complaints. · Integumentary: No rash or pruritis. · Neurological: No headache, diplopia, change in muscle strength, numbness or tingling. No change in gait, balance, coordination, mood, affect, memory, mentation, behavior. · Psychiatric: No anxiety, no depression. · Endocrine: No malaise, fatigue or temperature intolerance.  No excessive thirst, fluid intake, or urination. No tremor. · Hematologic/Lymphatic: No abnormal bruising or bleeding, blood clots or swollen lymph nodes. · Allergic/Immunologic: No nasal congestion or hives. Physical Examination:    Vitals:    08/27/20 1308   BP: 110/78   Site: Left Upper Arm   Position: Sitting   Cuff Size: Medium Adult   Pulse: 70   Resp: 18   SpO2: 98%   Weight: 134 lb 1.6 oz (60.8 kg)   Height: 5' 3\" (1.6 m)        Constitutional and General Appearance: Warm and dry, no apparent distress, normal coloration  HEENT:  Normocephalic, atraumatic  Respiratory:  · Normal excursion and expansion without use of accessory muscles  · Resp Auscultation: Normal breath sounds without dullness  Cardiovascular:  · The apical impulses not displaced  · Heart tones are crisp and normal  · JVP normal H2O  · Regular rate and irregular rhythm  · Peripheral pulses are symmetrical and full  · There is no clubbing, cyanosis of the extremities.   · No ankle edema  · Pedal Pulses: 2+ and equal   Abdomen:  · No masses or tenderness  · Liver/Spleen: No Abnormalities Noted  Neurological/Psychiatric:  · Alert and oriented in all spheres  · Moves all extremities well  · Exhibits normal gait balance and coordination  · No abnormalities of mood, affect, memory, mentation, or behavior are noted    Lab Data:    CBC:   Lab Results   Component Value Date    WBC 10.1 08/04/2020    WBC 7.0 07/01/2020    WBC 12.0 04/30/2020    RBC 5.12 08/04/2020    RBC 5.47 07/01/2020    RBC 5.42 04/30/2020    HGB 13.7 08/04/2020    HGB 14.3 07/01/2020    HGB 14.0 04/30/2020    HCT 43.7 08/04/2020    HCT 47.5 07/01/2020    HCT 46.0 04/30/2020    MCV 85.3 08/04/2020    MCV 86.9 07/01/2020    MCV 84.8 04/30/2020    RDW 21.0 08/04/2020    RDW 20.5 07/01/2020    RDW 21.6 04/30/2020     08/04/2020     07/01/2020     04/30/2020     BMP:  Lab Results   Component Value Date     08/04/2020     07/01/2020     04/30/2020    K 5.2 08/04/2020    K 4.2 07/01/2020    K 4.6 04/30/2020    K 4.1 03/25/2020    K 5.6 02/07/2020    K 5.1 02/06/2020     08/04/2020     07/01/2020     04/30/2020    CO2 22 08/04/2020    CO2 23 07/01/2020    CO2 26 04/30/2020    PHOS 3.9 08/04/2020    PHOS 3.0 07/01/2020    PHOS 4.2 04/30/2020    BUN 32 08/04/2020    BUN 31 07/01/2020    BUN 45 04/30/2020    CREATININE 1.3 08/04/2020    CREATININE 1.9 04/30/2020    CREATININE 1.9 04/21/2020     BNP:   Lab Results   Component Value Date    PROBNP 2,375 08/04/2020    PROBNP 2,255 04/21/2020    PROBNP 8,131 04/10/2020     Cardiac Imaging:  Echo 4/30/2020   Left ventricular cavity size is normal. Normal left ventricular wall   thickness. Left ventricular function appears to be reduced with an estimated   ejection fraction of 45%. Diffuse hypokinesis. Echo 7/19/2019   Summary    Left ventricle - normal size, thickness, reduced function with EF of 45%  LV wall motion - diffuse hypokinesis. Mitral valve - thickened, annular calcification, moderate regurgitation  Aortic valve - thickened, calcified, mild regurgitation  Tricuspid valve - mild regurgitation with PASP of 25mmHg  Pulmonic valve - trivial regurgitation   Biatrial enlargement  Pacemaker / ICD lead is visualized in the right heart. 6/2019  NSTEMI: . Angiogram findings were as below:      Findings:                      LM       Normal              LAD     50% ostial, mid 100%              Cx        40% OM1 at bifurcation              RCA     Mid 100%              LVG     Not performed              EDP     15 mmHg              L-LAD  Patent              S-PDA Known occluded  Severe Ca++:None  Post Cath Dx:Stable CAD, no apparent culprit for NSTEMI  Intervention:  None  Med Rec:        Continue aggressive med tx                          Continue plavix, no asa due to allergy. statin added. LDL 75   8/7/18  The PCI of complex proximal RCA chronic total occlusion was unsuccessful (J- score 3). follows with Dr Dannielle Alvarado:   1. Continue all current medications  2. RTO in 4 months   3. Recommend recheck CT chest in 12 months    I appreciate the opportunity of cooperating in the care of this individual.    WINSTON Elliott, 8/27/2020, 1:15 PM    QUALITY MEASURES  1. Tobacco Cessation Counseling: NA  2. Retake of BP if >140/90:   NA  3. Documentation to PCP/referring for new patient:  Sent to PCP at close of office visit  4. CAD patient on anti-platelet: Yes  5. CAD patient on STATIN therapy:  No did not tolerate  6.  Patient with CHF and aFib on anticoagulation:  Yes

## 2020-08-28 RX ORDER — MECLIZINE HCL 12.5 MG/1
12.5 TABLET ORAL 3 TIMES DAILY PRN
Qty: 90 TABLET | Refills: 0 | Status: SHIPPED | OUTPATIENT
Start: 2020-08-28 | End: 2020-11-09

## 2020-08-28 NOTE — TELEPHONE ENCOUNTER
Disp  Refills  Start  End     meclizine (ANTIVERT) 12.5 MG tablet  90 tablet  0  7/23/2019      Sig - Route:  Take 1 tablet by mouth 3 times daily as needed for Dizziness or Nausea - Oral       [facility] [address] [address] [phone] - F [phone]      Provider out of office      Please advise

## 2020-09-08 ENCOUNTER — OFFICE VISIT (OUTPATIENT)
Dept: FAMILY MEDICINE CLINIC | Age: 80
End: 2020-09-08
Payer: MEDICARE

## 2020-09-08 VITALS
WEIGHT: 134.25 LBS | DIASTOLIC BLOOD PRESSURE: 60 MMHG | OXYGEN SATURATION: 97 % | TEMPERATURE: 98.1 F | HEART RATE: 72 BPM | SYSTOLIC BLOOD PRESSURE: 120 MMHG | BODY MASS INDEX: 23.78 KG/M2 | RESPIRATION RATE: 16 BRPM

## 2020-09-08 PROCEDURE — G8420 CALC BMI NORM PARAMETERS: HCPCS | Performed by: FAMILY MEDICINE

## 2020-09-08 PROCEDURE — 99214 OFFICE O/P EST MOD 30 MIN: CPT | Performed by: FAMILY MEDICINE

## 2020-09-08 PROCEDURE — 96372 THER/PROPH/DIAG INJ SC/IM: CPT | Performed by: FAMILY MEDICINE

## 2020-09-08 PROCEDURE — 4040F PNEUMOC VAC/ADMIN/RCVD: CPT | Performed by: FAMILY MEDICINE

## 2020-09-08 PROCEDURE — 1036F TOBACCO NON-USER: CPT | Performed by: FAMILY MEDICINE

## 2020-09-08 PROCEDURE — 1090F PRES/ABSN URINE INCON ASSESS: CPT | Performed by: FAMILY MEDICINE

## 2020-09-08 PROCEDURE — G8399 PT W/DXA RESULTS DOCUMENT: HCPCS | Performed by: FAMILY MEDICINE

## 2020-09-08 PROCEDURE — 1123F ACP DISCUSS/DSCN MKR DOCD: CPT | Performed by: FAMILY MEDICINE

## 2020-09-08 PROCEDURE — G8427 DOCREV CUR MEDS BY ELIG CLIN: HCPCS | Performed by: FAMILY MEDICINE

## 2020-09-08 RX ORDER — METHYLPREDNISOLONE ACETATE 80 MG/ML
80 INJECTION, SUSPENSION INTRA-ARTICULAR; INTRALESIONAL; INTRAMUSCULAR; SOFT TISSUE ONCE
Status: COMPLETED | OUTPATIENT
Start: 2020-09-08 | End: 2020-09-08

## 2020-09-08 RX ORDER — ALLOPURINOL 300 MG/1
300 TABLET ORAL DAILY
Qty: 90 TABLET | Refills: 1 | Status: SHIPPED | OUTPATIENT
Start: 2020-09-08 | End: 2020-10-12

## 2020-09-08 RX ORDER — ERGOCALCIFEROL 1.25 MG/1
50000 CAPSULE ORAL WEEKLY
Qty: 12 CAPSULE | Refills: 3 | Status: ON HOLD
Start: 2020-09-08 | End: 2020-11-08 | Stop reason: HOSPADM

## 2020-09-08 RX ORDER — LEVOTHYROXINE SODIUM 112 UG/1
TABLET ORAL
Qty: 90 TABLET | Refills: 1 | Status: SHIPPED | OUTPATIENT
Start: 2020-09-08 | End: 2021-04-26

## 2020-09-08 RX ADMIN — METHYLPREDNISOLONE ACETATE 80 MG: 80 INJECTION, SUSPENSION INTRA-ARTICULAR; INTRALESIONAL; INTRAMUSCULAR; SOFT TISSUE at 14:52

## 2020-09-08 NOTE — PROGRESS NOTES
Medication given during visit:    Administrations This Visit     methylPREDNISolone acetate (DEPO-MEDROL) injection 80 mg     Admin Date  09/08/2020  14:52 Action  Given Dose  80 mg Route  Intramuscular Site  Dorsogluteal Left Administered By  Samuel Meadows    Ordering Provider:  Coni Morrissey MD    NDC:  9597-5707-68    Lot#:  [id number]    :  [id number]. Patient Supplied?:  No    Comments:  EXP 12/20                Patient instructed to remain in clinic for 20 minutes after injection and was advised to report any adverse reaction to me immediately.

## 2020-09-08 NOTE — PROGRESS NOTES
Subjective:      Patient ID: Hemant Gonzalez is a 78 y.o. female. CC: Patient presents for re-evaluation of chronic health problems including dizziness, allergic rhinitis, claudication of both legs, chronic renal failure, peripheral neuropathy and memory loss. HPI Pt is here for a follow up and states she has been feeling dizzy. Patient had dizziness for some time. She thought maybe her allergies are somewhat worsen recently. She has been using over-the-counter antihistamines but her nasal congestion is worsening. Her urinary symptoms from last appointment time have resolved. She was having some left-sided chest pain but this is also improved. She had a recent evaluation with cardiology and does have a follow-up appoint with nephrology in the near future. No change of therapy. Neurogenic claudication of her back is relatively unchanged since last office. Patient realizes she is having some memory issues and is starting rely more family members for things. Patient is very compliant with medications with no adverse reactions.     Review of Systems  Patient Active Problem List   Diagnosis    Chronic atrial fibrillation    Essential hypertension, benign    Mixed hyperlipidemia    Chronic gouty arthropathy    Acquired hypothyroidism    Arthritis    Non-rheumatic mitral regurgitation    Restrictive lung disease    Sick sinus syndrome (HCC)    Allergic rhinitis    Fibrocystic breast    Cerebrovascular accident (CVA) ([address]    HTN (hypertension), benign    Pacemaker    Primary osteoarthritis involving multiple joints    Vitamin D deficiency    Tremor    Chronic total occlusion of native coronary artery    Age related osteoporosis    Acute on chronic systolic (congestive) heart failure (HCC)    Chronic renal failure, stage 3 (moderate) (HCC)    [facility] disease) [facility]    Atherosclerosis of native arteries of extremities with intermittent claudication, bilateral legs ([address]    Idiopathic progressive neuropathy    Ischemic cardiomyopathy    Dizziness    Peripheral vertigo, bilateral    PAF (paroxysmal atrial fibrillation) (HCC)    Dyslipidemia    Heart failure (HCC)    Generalized weakness    Iron deficiency anemia    Anemia    Bilateral sensorineural hearing loss       Outpatient Medications Marked as Taking for the 9/8/20 encounter (Office Visit) with Yung Ruano MD   Medication Sig Dispense Refill    meclizine (ANTIVERT) 12.5 MG tablet Take 1 tablet by mouth 3 times daily as needed for Dizziness or Nausea 90 tablet 0    torsemide (DEMADEX) 10 MG tablet 10 mg alternating with 20 mg 120 tablet 1    levothyroxine (SYNTHROID) 112 MCG tablet TAKE 1 TABLET EVERY DAY 90 tablet 0    metoprolol succinate (TOPROL XL) 100 MG extended release tablet Take 1 tablet by mouth 2 times daily 180 tablet 1    XARELTO 15 MG TABS tablet TAKE 1 TABLET DAILY WITH BREAKFAST 90 tablet 3    allopurinol (ZYLOPRIM) 100 MG tablet Take 1 tablet by mouth daily 90 tablet 1    clopidogrel (PLAVIX) 75 MG tablet Take 1 tablet by mouth daily 90 tablet 2    topiramate (TOPAMAX) 50 MG tablet Take 1 tablet by mouth 2 times daily 60 tablet 3    allopurinol (ZYLOPRIM) 300 MG tablet Take 1 tablet by mouth daily 90 tablet 1    tiZANidine (ZANAFLEX) 4 MG tablet Take 4 mg by mouth every 6 hours as needed      vitamin D (ERGOCALCIFEROL) 1.25 MG (89015 UT) CAPS capsule Take 50,000 Units by mouth once a week         Allergies   Allergen Reactions    Aspirin Nausea Only    Ativan [Lorazepam] Other (See Comments)     hallucinations    Diltiazem Anaphylaxis    Sulfa Antibiotics Rash and Hives    Dabigatran Nausea Only     And indigestion  And indigestion    Aka Pradaxa    Lipitor [Atorvastatin] Other (See Comments)     Muscle pains    Mysoline [Primidone]     Nsaids     Other Other (See Comments)     Nitroglycerin patches causes severe headaches       Social History     Tobacco Use    Smoking status: Former Smoker     Packs/day: 1.00     Years: 28.00     Pack years: 28.00     Types: Cigarettes     Last attempt to quit: 1987     Years since quittin.7    Smokeless tobacco: Never Used    Tobacco comment: H.O.smoking at age 15 / smoked up to 1 p.p.d / quit    Substance Use Topics    Alcohol use: Not Currently     Alcohol/week: 0.0 standard drinks       /60 (Site: Left Upper Arm, Position: Sitting, Cuff Size: Medium Adult)   Pulse 72   Temp 98.1 °F (36.7 °C) (Tympanic)   Resp 16   Wt 134 lb 4 oz (60.9 kg)   SpO2 97%   BMI 23.78 kg/m²     Objective:   Physical Exam  Vitals signs reviewed. Constitutional:       General: She is not in acute distress. Appearance: She is well-developed. HENT:      Right Ear: Tympanic membrane normal.      Left Ear: Tympanic membrane normal.      Nose: Mucosal edema, congestion and rhinorrhea present. Right Sinus: No maxillary sinus tenderness or frontal sinus tenderness. Left Sinus: No maxillary sinus tenderness or frontal sinus tenderness. Neck:      Musculoskeletal: Neck supple. Vascular: No carotid bruit. Cardiovascular:      Rate and Rhythm: Normal rate. Rhythm regularly irregular. Pulses:           Dorsalis pedis pulses are 2+ on the right side and 2+ on the left side. Posterior tibial pulses are 2+ on the right side and 2+ on the left side. Heart sounds: Murmur present. Systolic murmur present with a grade of 2/6. No friction rub. No gallop. Pulmonary:      Effort: Pulmonary effort is normal. No respiratory distress. Breath sounds: Normal breath sounds. Musculoskeletal: Normal range of motion. General: No tenderness. Right lower leg: No edema. Left lower leg: No edema. Lymphadenopathy:      Cervical: No cervical adenopathy. Neurological:      Mental Status: She is alert. Sensory: Sensation is intact. Motor: Motor function is intact.    Psychiatric:         Mood and Affect: Mood normal.         Speech: Speech normal.         Behavior: Behavior is cooperative. Thought Content: Thought content normal.         Cognition and Memory: Cognition normal. She exhibits impaired recent memory. She does not exhibit impaired remote memory. Assessment:      Roseline Jimenes was seen today for follow-up and hypertension. Diagnoses and all orders for this visit:    Dizziness    Allergic rhinitis due to pollen, unspecified seasonality    Atherosclerosis of native arteries of extremities with intermittent claudication, bilateral legs (HCC)    Chronic renal failure, stage 3 (moderate) (HCC)    Idiopathic progressive neuropathy    Memory loss due to medical condition    Other orders  -     allopurinol (ZYLOPRIM) 300 MG tablet; Take 1 tablet by mouth daily  -     levothyroxine (SYNTHROID) 112 MCG tablet; TAKE 1 TABLET EVERY DAY  -     vitamin D (ERGOCALCIFEROL) 1.25 MG (58249 UT) CAPS capsule; Take 1 capsule by mouth once a week  -     methylPREDNISolone acetate (DEPO-MEDROL) injection 80 mg            Plan:      Laboratory profile reviewed with patient with stable kidney function and persistent congestive heart failure      Advised that she could use over-the-counter Claritin or Zyrtec for allergies and proceed with Depo-Medrol injection while she is here today. Agreed with current management in regards to heart failure and renal disease. No additional medications were added to her today. Memory issues is probably related to vascular decline and she is currently on maximum medical management. RTC 3 months    Please note that this chart was generated using Dragon dictation software. Although every effort was made to ensure the accuracy of this automated transcription, some errors in transcription may have occurred.

## 2020-09-09 PROBLEM — R41.3 MEMORY LOSS DUE TO MEDICAL CONDITION: Status: ACTIVE | Noted: 2020-09-09

## 2020-09-11 ENCOUNTER — TELEPHONE (OUTPATIENT)
Dept: FAMILY MEDICINE CLINIC | Age: 80
End: 2020-09-11

## 2020-09-11 RX ORDER — CEFDINIR 300 MG/1
300 CAPSULE ORAL 2 TIMES DAILY
Qty: 10 CAPSULE | Refills: 0 | Status: SHIPPED | OUTPATIENT
Start: 2020-09-11 | End: 2020-09-30 | Stop reason: SDUPTHER

## 2020-09-18 ENCOUNTER — NURSE TRIAGE (OUTPATIENT)
Dept: OTHER | Facility: CLINIC | Age: 80
End: 2020-09-18

## 2020-09-18 ENCOUNTER — OFFICE VISIT (OUTPATIENT)
Dept: FAMILY MEDICINE CLINIC | Age: 80
End: 2020-09-18
Payer: MEDICARE

## 2020-09-18 VITALS
TEMPERATURE: 97.1 F | RESPIRATION RATE: 12 BRPM | BODY MASS INDEX: 23.6 KG/M2 | HEART RATE: 69 BPM | WEIGHT: 133.25 LBS | SYSTOLIC BLOOD PRESSURE: 110 MMHG | DIASTOLIC BLOOD PRESSURE: 60 MMHG | OXYGEN SATURATION: 97 %

## 2020-09-18 PROBLEM — G60.9 IDIOPATHIC PERIPHERAL NEUROPATHY: Status: ACTIVE | Noted: 2019-11-22

## 2020-09-18 PROCEDURE — 1123F ACP DISCUSS/DSCN MKR DOCD: CPT | Performed by: FAMILY MEDICINE

## 2020-09-18 PROCEDURE — 1036F TOBACCO NON-USER: CPT | Performed by: FAMILY MEDICINE

## 2020-09-18 PROCEDURE — 1090F PRES/ABSN URINE INCON ASSESS: CPT | Performed by: FAMILY MEDICINE

## 2020-09-18 PROCEDURE — G8428 CUR MEDS NOT DOCUMENT: HCPCS | Performed by: FAMILY MEDICINE

## 2020-09-18 PROCEDURE — G8420 CALC BMI NORM PARAMETERS: HCPCS | Performed by: FAMILY MEDICINE

## 2020-09-18 PROCEDURE — 4040F PNEUMOC VAC/ADMIN/RCVD: CPT | Performed by: FAMILY MEDICINE

## 2020-09-18 PROCEDURE — G8399 PT W/DXA RESULTS DOCUMENT: HCPCS | Performed by: FAMILY MEDICINE

## 2020-09-18 PROCEDURE — 99214 OFFICE O/P EST MOD 30 MIN: CPT | Performed by: FAMILY MEDICINE

## 2020-09-18 RX ORDER — HYDROCODONE BITARTRATE AND ACETAMINOPHEN 5; 325 MG/1; MG/1
1 TABLET ORAL 4 TIMES DAILY PRN
Qty: 120 TABLET | Refills: 0 | Status: SHIPPED | OUTPATIENT
Start: 2020-09-18 | End: 2021-01-22 | Stop reason: SDUPTHER

## 2020-09-18 RX ORDER — TRAZODONE HYDROCHLORIDE 50 MG/1
50 TABLET ORAL NIGHTLY
Qty: 30 TABLET | Refills: 5 | Status: SHIPPED | OUTPATIENT
Start: 2020-09-18 | End: 2020-11-09 | Stop reason: ALTCHOICE

## 2020-09-18 ASSESSMENT — ENCOUNTER SYMPTOMS: NAUSEA: 1

## 2020-09-18 NOTE — TELEPHONE ENCOUNTER
Reason for Disposition   Lightheadedness (dizziness) present now, after 2 hours of rest and fluids    Answer Assessment - Initial Assessment Questions  1. DESCRIPTION: \"Describe your dizziness. \"      Right now is sitting down and feels dizzy and nausea  2. LIGHTHEADED: \"Do you feel lightheaded? \" (e.g., somewhat faint, woozy, weak upon standing)  Feels lighthheaded  3. VERTIGO: \"Do you feel like either you or the room is spinning or tilting? \" (i.e. vertigo)  No spinning  4. SEVERITY: \"How bad is it? \"  \"Do you feel like you are going to faint? \" \"Can you stand and walk? \"    - MILD - walking normally    - MODERATE - interferes with normal activities (e.g., work, school)     - SEVERE - unable to stand, requires support to walk, feels like passing out now.    still able to get up and walk  5. ONSET:  \"When did the dizziness begin? \"  Dizziness for several hours today. Seeing neurologist in October. Has been diagnosed with vertigo  6. AGGRAVATING FACTORS: \"Does anything make it worse? \" (e.g., standing, change in head position)  Position changes will make it worse  7. HEART RATE: \"Can you tell me your heart rate? \" \"How many beats in 15 seconds? \"  (Note: not all patients can do this)      8. CAUSE: \"What do you think is causing the dizziness?\"    9. RECURRENT SYMPTOM: \"Have you had dizziness before? \" If so, ask: \"When was the last time? \" \"What happened that time? \"    10. OTHER SYMPTOMS: \"Do you have any other symptoms? \" (e.g., fever, chest pain, vomiting, diarrhea, bleeding)    Nausea. No vomiting. Vision seems blurry at times. No chest pain, no sob, no fever  11. PREGNANCY: \"Is there any chance you are pregnant? \" \"When was your last menstrual period? \"    Protocols used: [id number]    Received call from [employer]. Pt calling with dizziness that started this morning. Has been diagnosed with vertigo and sees neuro in October. Today has been feeling lightheaded for several hours.  Feeling nauseous. No other symptoms. Recommend pt is seen today, call sooner if worsens. Call soft transferred to [address] to schedule appointment. Please do not reply to the triage nurse through this encounter. Any subsequent communication should be directly with the patient.

## 2020-09-23 ENCOUNTER — OFFICE VISIT (OUTPATIENT)
Dept: NEUROLOGY | Age: 80
End: 2020-09-23
Payer: MEDICARE

## 2020-09-23 VITALS
BODY MASS INDEX: 23.57 KG/M2 | SYSTOLIC BLOOD PRESSURE: 116 MMHG | WEIGHT: 133 LBS | HEART RATE: 69 BPM | HEIGHT: 63 IN | DIASTOLIC BLOOD PRESSURE: 61 MMHG

## 2020-09-23 PROCEDURE — 4040F PNEUMOC VAC/ADMIN/RCVD: CPT | Performed by: PSYCHIATRY & NEUROLOGY

## 2020-09-23 PROCEDURE — G8420 CALC BMI NORM PARAMETERS: HCPCS | Performed by: PSYCHIATRY & NEUROLOGY

## 2020-09-23 PROCEDURE — 99214 OFFICE O/P EST MOD 30 MIN: CPT | Performed by: PSYCHIATRY & NEUROLOGY

## 2020-09-23 PROCEDURE — 1123F ACP DISCUSS/DSCN MKR DOCD: CPT | Performed by: PSYCHIATRY & NEUROLOGY

## 2020-09-23 PROCEDURE — 1036F TOBACCO NON-USER: CPT | Performed by: PSYCHIATRY & NEUROLOGY

## 2020-09-23 PROCEDURE — G8399 PT W/DXA RESULTS DOCUMENT: HCPCS | Performed by: PSYCHIATRY & NEUROLOGY

## 2020-09-23 PROCEDURE — G8427 DOCREV CUR MEDS BY ELIG CLIN: HCPCS | Performed by: PSYCHIATRY & NEUROLOGY

## 2020-09-23 PROCEDURE — 1090F PRES/ABSN URINE INCON ASSESS: CPT | Performed by: PSYCHIATRY & NEUROLOGY

## 2020-09-23 NOTE — PROGRESS NOTES
Dysphagia                      [] Denies all of the above    Genitourinary:      []  Frequency   []  Hematuria     []  Urinary incontinence           [x] Denies all of the above     Hematologic/lymphatic:  []  Bleeding    [x]  Easy bruising   []  Anemia  [] Denies all of the above     Musculoskeletal:   [x] Back pain       []  Myalgias    []  Neck pain           [] Denies all of the above    Neurological: As noted in HPI    Behavioral/Psych:   [] Anxiety    []  Depression     []  Mood swings     [x] Denies all of the above     Endocrine:   []  Temperature intolerance     [x] Fatigue      [] Denies all of the above     Allergic/Immunologic:   [] Hay fever    [x] Denies all of the above     Past Medical History:   Diagnosis Date    Allergic rhinitis, cause unspecified     Arthritis     Atrial fibrillation ([address]     Bronchopneumonia     CAD (coronary artery disease)     stent:  post cataract surgery (CABG)    [facility]erebral artery occlusion with cerebral infarction [facility]     TIA    Chronic gouty arthropathy     Chronic kidney disease     Congestive heart failure, unspecified     Degeneration of cervical intervertebral disc     Essential and other specified forms of tremor     Gout     HIGH CHOLESTEROL     History of CVA (cerebrovascular accident)     Hx of blood clots     Hypertension     Influenza 12/23/2017    Mitral valve stenosis and aortic valve insufficiency     Movement disorder     back problems    Pacemaker     Peptic ulcer, unspecified site, unspecified as acute or chronic, without mention of hemorrhage, perforation, or obstruction     Thyroid disease     Unspecified disorder of kidney and ureter     Unspecified hypothyroidism      Family History   Problem Relation Age of Onset    Cancer Sister     Heart Disease Sister     Diabetes Brother     Hypertension Brother     Heart Disease Brother     Stroke Maternal Aunt     Heart Disease Maternal Aunt     Diabetes Maternal Uncle     Hypertension Paternal Aunt     Cancer Maternal Grandmother     Cancer Paternal Grandmother     Rheum Arthritis Neg Hx     Lupus Neg Hx      Social History     Socioeconomic History    Marital status:      Spouse name: Kristel Martin Number of children: 3    Years of education: 15    Highest education level: None   Occupational History    None   Social Needs    Financial resource strain: None    Food insecurity     Worry: None     Inability: None    Transportation needs     Medical: None     Non-medical: None   Tobacco Use    Smoking status: Former Smoker     Packs/day: 1.00     Years: 28.00     Pack years: 28.00     Types: Cigarettes     Last attempt to quit: 1987     Years since quittin.7    Smokeless tobacco: Never Used    Tobacco comment: H.O.smoking at age 15 / smoked up to 1 p.p.d / quit    Substance and Sexual Activity    Alcohol use: Not Currently     Alcohol/week: 0.0 standard drinks    Drug use: No    Sexual activity: Not Currently   Lifestyle    Physical activity     Days per week: None     Minutes per session: None    Stress: None   Relationships    Social connections     Talks on phone: None     Gets together: None     Attends Orthodox service: None     Active member of club or organization: None     Attends meetings of clubs or organizations: None     Relationship status: None    Intimate partner violence     Fear of current or ex partner: None     Emotionally abused: None     Physically abused: None     Forced sexual activity: None   Other Topics Concern    None   Social History Narrative    None       PHYSICAL EXAMINATION:  Patient is awake, alert and oriented x3. Speech is normal.  Pupils are equal round reacting to light. Extraocular movements intact. Face symmetrical. Tongue midline. Motor exam shows normal symmetrical strength. Deep tendon reflexes normal. Plantar reflexes downgoing.   Sensory exam shows decreased light touch in the distal lower extremities. Coordination normal. Gait steady. No carotid bruit. No neck stiffness. DATA:  LABS:  General Labs:    CBC:   Lab Results   Component Value Date    WBC 10.1 08/04/2020    RBC 5.12 08/04/2020    HGB 13.7 08/04/2020    HCT 43.7 08/04/2020    MCV 85.3 08/04/2020    MCH 26.7 08/04/2020    MCHC 31.3 08/04/2020    RDW 21.0 08/04/2020     08/04/2020    MPV 9.1 08/04/2020     BMP:    Lab Results   Component Value Date     08/04/2020    K 5.2 08/04/2020    K 4.1 03/25/2020     08/04/2020    CO2 22 08/04/2020    BUN 32 08/04/2020    LABALBU 3.9 08/04/2020    CREATININE 1.3 08/04/2020    CALCIUM 9.1 08/04/2020    GFRAA 48 08/04/2020    GFRAA 38 04/02/2013    LABGLOM 39 08/04/2020    GLUCOSE 95 08/04/2020    GLUCOSE 82 07/01/2020     RADIOLOGY REVIEW:  I have reviewed radiology image(s) and reports(s) of: CT scan of the head    IMPRESSION :  Benign functional vertigo bilateral but worse with the right side  Benign essential tremor  Idiopathic peripheral neuropathy  Atrial fibrillation    RECOMMENDATIONS :  Discussed with patient and daughter  Recommend vestibular therapy and exercise with  Continue Xarelto for atrial fibrillation        Please note a portion of this chart was generated using dragon dictation software. Although every effort was made to ensure the accuracy of this automated transcription, some errors in transcription may have occurred.

## 2020-09-30 ENCOUNTER — OFFICE VISIT (OUTPATIENT)
Dept: FAMILY MEDICINE CLINIC | Age: 80
End: 2020-09-30
Payer: MEDICARE

## 2020-09-30 ENCOUNTER — TELEPHONE (OUTPATIENT)
Dept: FAMILY MEDICINE CLINIC | Age: 80
End: 2020-09-30

## 2020-09-30 VITALS
DIASTOLIC BLOOD PRESSURE: 70 MMHG | HEART RATE: 69 BPM | TEMPERATURE: 97.7 F | SYSTOLIC BLOOD PRESSURE: 122 MMHG | BODY MASS INDEX: 23.6 KG/M2 | OXYGEN SATURATION: 96 % | RESPIRATION RATE: 12 BRPM | WEIGHT: 133.25 LBS

## 2020-09-30 LAB
BACTERIA URINE, POC: 0
BILIRUBIN URINE: 0 MG/DL
BLOOD, URINE: POSITIVE
CASTS URINE, POC: 0
CLARITY: CLEAR
COLOR: YELLOW
CRYSTALS URINE, POC: 0
EPI CELLS URINE, POC: 2
GLUCOSE URINE: NORMAL
KETONES, URINE: NEGATIVE
LEUKOCYTE EST, POC: POSITIVE
NITRITE, URINE: NEGATIVE
PH UA: 5 (ref 4.5–8)
PROTEIN UA: NEGATIVE
RBC URINE, POC: 0
SPECIFIC GRAVITY UA: 1.02 (ref 1–1.03)
UROBILINOGEN, URINE: NORMAL
WBC URINE, POC: NORMAL
YEAST URINE, POC: 0

## 2020-09-30 PROCEDURE — 81000 URINALYSIS NONAUTO W/SCOPE: CPT | Performed by: FAMILY MEDICINE

## 2020-09-30 PROCEDURE — G8420 CALC BMI NORM PARAMETERS: HCPCS | Performed by: FAMILY MEDICINE

## 2020-09-30 PROCEDURE — 4040F PNEUMOC VAC/ADMIN/RCVD: CPT | Performed by: FAMILY MEDICINE

## 2020-09-30 PROCEDURE — 99213 OFFICE O/P EST LOW 20 MIN: CPT | Performed by: FAMILY MEDICINE

## 2020-09-30 PROCEDURE — G8428 CUR MEDS NOT DOCUMENT: HCPCS | Performed by: FAMILY MEDICINE

## 2020-09-30 PROCEDURE — 1036F TOBACCO NON-USER: CPT | Performed by: FAMILY MEDICINE

## 2020-09-30 PROCEDURE — G8399 PT W/DXA RESULTS DOCUMENT: HCPCS | Performed by: FAMILY MEDICINE

## 2020-09-30 PROCEDURE — 1090F PRES/ABSN URINE INCON ASSESS: CPT | Performed by: FAMILY MEDICINE

## 2020-09-30 PROCEDURE — 1123F ACP DISCUSS/DSCN MKR DOCD: CPT | Performed by: FAMILY MEDICINE

## 2020-09-30 RX ORDER — CEFDINIR 300 MG/1
300 CAPSULE ORAL 2 TIMES DAILY
Qty: 10 CAPSULE | Refills: 0 | Status: SHIPPED | OUTPATIENT
Start: 2020-09-30 | End: 2020-10-05

## 2020-09-30 NOTE — TELEPHONE ENCOUNTER
----- Message from Gabriela Pettit sent at 9/30/2020  4:05 PM EDT -----  Subject: Message to Provider    QUESTIONS  Information for Provider? Patient just left appointment and forgot to ask   if she could get the flu shot. If it is alright she will just get it at   one of drug stores and get the vaccine.   ---------------------------------------------------------------------------  --------------  CALL BACK INFO  What is the best way for the office to contact you? OK to leave message on   voicemail  Preferred Call Back Phone Number? [phone]  ---------------------------------------------------------------------------  --------------  SCRIPT ANSWERS  Relationship to Patient?  Self

## 2020-09-30 NOTE — PROGRESS NOTES
Subjective:      Patient ID: Chance Rubio is a 78 y.o. female. CC: Patient presents for acute medical problem-recurrent urinary tract infection. Medical assistant notes reviewed. Urinary Frequency    This is a new problem. Episode onset: 2 days  The problem occurs every urination. The problem has been gradually worsening. The quality of the pain is described as shooting (pressure, discomfort ). The pain is at a severity of 5/10. The pain is moderate. There has been no fever. She is not sexually active. Associated symptoms include frequency and urgency. She has tried nothing for the symptoms. Patient states that the last time she was treated with antibiotics her symptoms did improve but now have recurred again. She has urinary frequency and urgency symptoms. No reported fevers or chills    Review of Systems   Genitourinary: Positive for frequency and urgency. Allergies   Allergen Reactions    Aspirin Nausea Only    Ativan [Lorazepam] Other (See Comments)     hallucinations    Diltiazem Anaphylaxis    Sulfa Antibiotics Rash and Hives    Dabigatran Nausea Only     And indigestion  And indigestion    Aka Pradaxa    Lipitor [Atorvastatin] Other (See Comments)     Muscle pains    Mysoline [Primidone]     Nsaids     Other Other (See Comments)     Nitroglycerin patches causes severe headaches       Objective:   Physical Exam  Constitutional:       General: She is not in acute distress. Appearance: Normal appearance. She is well-developed. Abdominal:      General: Bowel sounds are normal. There is no distension. Palpations: Abdomen is soft. Abdomen is not rigid. There is no hepatomegaly, splenomegaly or mass. Tenderness: There is no abdominal tenderness. There is no right CVA tenderness, left CVA tenderness, guarding or rebound. Hernia: No hernia is present. Skin:     General: Skin is warm. Findings: No rash. Neurological:      Mental Status: She is alert.  Mental status is at baseline. Psychiatric:         Behavior: Behavior is cooperative. Assessment:      Manuel Green was seen today for urinary frequency. Diagnoses and all orders for this visit:    Frequent urination  -     POC URINE with Microscopic  -     Culture, Urine    OAB (overactive bladder)    Other orders  -     cefdinir (OMNICEF) 300 MG capsule; Take 1 capsule by mouth 2 times daily for 5 days            Plan:      Discussed with patient and daughter that we would start antibiotic therapy since she did not improve last time and do a urine culture. If her urine culture does not demonstrate any bacterial infection it would probably best that we start treatment for overactive bladder. RTC PRN     Please note that this chart was generated using Dragon dictation software. Although every effort was made to ensure the accuracy of this automated transcription, some errors in transcription may have occurred.

## 2020-10-01 LAB — URINE CULTURE, ROUTINE: NORMAL

## 2020-10-02 ENCOUNTER — HOSPITAL ENCOUNTER (OUTPATIENT)
Dept: PHYSICAL THERAPY | Age: 80
Setting detail: THERAPIES SERIES
Discharge: HOME | End: 2020-10-02
Payer: MEDICARE

## 2020-10-02 PROCEDURE — 97162 PT EVAL MOD COMPLEX 30 MIN: CPT

## 2020-10-02 PROCEDURE — 97112 NEUROMUSCULAR REEDUCATION: CPT

## 2020-10-02 NOTE — PLAN OF CARE
Worse  [] Same      [] Episodic    Description of Spells: [x] Constant [] Spontaneous [x] Motion Induced [x] Induced by position changes [] Other:    Length of time spells occur: [] Seconds [] Minutes  [x] Hours [x] Days [] Other:     Date of onset: 10/1/2020  Setting in which symptoms first occurred: vertigo attacks for years and then constant dizziness in last year    What increases/provokes symptoms? Walking and in/out of bed    What decreases/eases symptoms? Sit still    Hearing impairments:  [] Yes  [x] No  [] Other:     Hearing changes since onset:  [] Yes  [x] No  [] Other:    Visual changes since onset: [x] Yes  [] No  [] Other:blurred vision -talked with pt about seeing eye MD after PT if con't  Recent falls:    [] Yes  [x] No     Comments:      History of migraines/HA:  [] Yes  [x] No    Comments:    Previous treatments: no     Job requirements/work status: retired    Living Status/Prior Level of Function: Prior to this injury / incident, patient was independent with ADLs and IADLs, pt is recently  6/2020, per daughter, pt has declined with covid and spouse passing. Pt daughter with pt most days.     OBJECTIVE:     Palpation: cervical mm mod increase tone and tender    Functional Mobility/Transfers: with CGA    Posture: mod round dao and forward head    Inspection: min head tremor, per pt she is taking meds for this    Numbness/Tingling: neuropathy L LE foot up to knee, R foot burns    Gait: (include devices/WB status) at home with RW today with daughter min A    Cervical AROM Pain with all and dizziness   Cervical Flexion 45    Cervical Extension 25    Cervical SB L20 R20    Cervical rotation L35 R40    Upper Extremity PROM AROM         L R L R   Shoulder Flexion    120 120   Shoulder Abduction    100 100   Shoulder External Rotation        Shoulder Internal Rotation        Elbow Flexion        Elbow Extension                                                                  Oculomotor/Vestibular Examination & Special Tests:     Coordination:  Rapid alternating movements: [] Normal [] Dysdiadochokinesia   Finger to Nose:   [] Normal [] Dysmetric  Heel to Shin:    [] Normal [] Ataxic    Balance & Postural Control Tests:  Clinical Test of Sensory Interaction for Balance (CTSIB) performed in Romberg stance  CONDITION TIME STRATEGY SWAY    Eyes open, firm surface 10 sec      Eyes closed, firm surface 10sec ankle Min/moderate    Eyes open, foam surface       Eyes closed, foam surface        Balance: UNABLE to do on eval  CONDITION TIME STRATEGY SWAY   Narrowed MICHELE      Tandem R      Tandem L      SLS L      SLS R        Oculomotor/Vestibular Examination     Spontaneous nystagmus:  [] Left  [] Right [x] Absent    Gaze-Evoked nystagmus with fixation present:   Primary [] Present [x] Absent   Right  [] Present [x] Absent   Left  [] Present [x] Absent    Smooth Pursuit (H):  [] Normal [x] Abnormal Comments:    Smooth Pursuit (V):  [] Normal [x] Abnormal Comments:     Saccades (H):  [] Normal [x] Abnormal Comments:     Saccades (V):  [] Normal [x] Abnormal Comments:     Convergence:  [] Normal [x] Abnormal Comments:     VOR Cancellation (H): [] Normal [x] Abnormal Comments:    VOR Cancellation (V): [] Normal [x] Abnormal Comments:     VOR (V):   [] Normal [x] Abnormal Comments:    VOR (H):   [] Normal [x] Abnormal Comments:    Positional Testing:back pain   R Hallpike-Nina maneuver:    Nystagmus:  [] Yes  [x] No  [] Duration:       [] Direction:    Vertigo:  [] Yes  [x] No  [] Duration:     L Hallpike-Nina maneuver:   Nystagmus:  [] Yes  [x] No  [] Duration:       [] Direction:    Vertigo:  [] Yes  [x] No  [] Duration:            [x] Patient history, allergies, meds reviewed. Medical chart reviewed. See intake form. Review of Systems (ROS):  [x]Performed Review of systems (Integumentary, Cardiopulmonary, Neurological) by intake and observation. Intake form has been scanned into medical record.  Patient has been instructed to contact their primary care physician regarding ROS issues if not already being addressed at this time. Co-morbidities/Complexities (which will affect course of rehabilitation):   []None           Arthritic conditions   []Rheumatoid arthritis (M05.9)  []Osteoarthritis (M19.91)   Cardiovascular conditions   [x]Hypertension (I10)  []Hyperlipidemia (E78.5)  []Angina pectoris (I20)  []Atherosclerosis (I70)   Musculoskeletal conditions   []Disc pathology   []Congenital spine pathologies   []Prior surgical intervention  [x]Osteoporosis (M81.8)  []Osteopenia (M85.8)   Endocrine conditions   []Hypothyroid (E03.9)  []Hyperthyroid Gastrointestinal conditions   []Constipation (S37.38)   Metabolic conditions   []Morbid obesity (E66.01)  []Diabetes type 1(E10.65) or 2 (E11.65)   [x]Neuropathy (G60.9)     Pulmonary conditions   []Asthma (J45)  []Coughing   []COPD (J44.9)   Psychological Disorders  []Anxiety (F41.9)  []Depression (F32.9)   []Other:   [x]Other: hx of vertigo spells  Hx of head tremor takes meds          Barriers to/and or personal factors that will affect rehab potential:              [x]Age  []Sex    []Smoker              []Motivation/Lack of Motivation                        [x]Co-Morbidities              []Cognitive Function, education/learning barriers              []Environmental, home barriers              []profession/work barriers  []past PT/medical experience  []other:  Justification:     Falls Risk Assessment (30 days):   [x] Falls Risk assessed; no intervention required. [] Falls Risk assessed;  Patient requires intervention due to being higher risk   TUG score (>12s at risk):     [] Falls education provided, including       ASSESSMENT:   Functional Impairments:  Problem List/Functional Limitations:   [] BPPV    [] Right [] Posterior Canal [] Canalithiasis    [] Left  [] Horizontal Canal [] Cupulolithiasis   [x] Decreased Gaze Stabilization    [x] Increased Motion Sensitivity   [x] Unilateral Vestibular Hypofunction [x] Bilateral Vestibular Hypofunction   [x] Gait Instability    [x] Decreased tolerance for ADLs    [x] Decreased functional strength   [x] Reduced Balance/Proprioceptive control   [x] Reduced ability to hear/focus   [x] Noted cervical/thoracic/GHJ joint hypomobility   [] Noted cervical/thoracic/GHJ joint hypermobility   [x] Decreased cervical/UE functional ROM   [] Noted Headache pain aggravated by neck movements with/without dizziness   [] Abnormal reflexes/sensation/myotomal/dermatomal deficits   [] Decreased DCF control or ability to hold head up   [x] Decreased RC/scapular/core strength and neuromuscular control     Functional Activity Limitations (from functional questionnaire and intake)   []Reduced ability to tolerate prolonged functional positions   [x]Reduced ability or difficulty with changes of positions or transfers between positions  [x]Reduced ability to transfer in/out of bed or rolling in bed   [x]Reduced ability or tolerance with driving, reading and/or computer work   [x]Reduced ability to perform lifting, reaching, carrying tasks   [x]Reduced ability to forward bend   [x]Reduced ability to ambulate prolonged functional periods/distances/surfaces   [x]Reduced ability to ascend/descend stairs  [x]Reduced ability to concentrate/focus  [x]Reduced ability to turn/pitch head rapidly  [x]Reduced ability to self-correct for losses of balance []other:       Participation Restrictions   [x]Reduced participation in self-care activities   [x]Reduced participation in home management activities   []Reduced participation in work activities   [x]Reduced participation in social activities   []Reduced participation in sport/recreational activities    Classification:   []Signs/symptoms consistent with BPPV (benign paroxysmal positional vertigo)      [x]Signs/symptoms consistent with unilateral peripheral vestibulopathy (i.e., vestibular neuritis, labyrinthitis, acoustic neuroma) [x]Signs/symptoms consistent with central vestibulopathy  []Signs/symptoms consistent with migraine-related vestibulopathy  [x]Signs/symptoms consistent with Meniere's disease / post-traumatic hydrops  []Signs/symptoms consistent with perilymphatic fistula   [x]Signs/symptoms consistent with cervicogenic dizziness  [x]Signs/symptoms consistent with gait instability   [x]Signs/symptoms consistent with motion sensitivity    [x]signs/symptoms consistent with neck pain with mobility deficits     []signs/symptoms consistent with neck pain with movement coordinated impairments    []signs/symptoms consistent with neck pain with radiating pain    []signs/symptoms consistent with neck pain with headaches (cervicogenic)    []Signs/symptoms consistent with nerve root involvement including myotome & dermatome dysfunction   []sign/symptoms consistent with facet dysfunction of cervical and thoracic spine    []signs/symptoms consistent suggesting central cord compression/UMN syndromes   []signs/symptoms consistent with discogenic cervical pain   []signs/symptoms consistent with rib dysfunction   [x]signs/symptoms consistent with postural dysfunction   []signs/symptoms consistent with shoulder pathology    []signs/symptoms consistent with post-surgical status including decreased ROM, strength and function.    []signs/symptoms consistent with pathology which may benefit from Dry Needling  []signs/symptoms which may limit the use of advanced manual therapy techniques: (Elevated CV risk profile, recent trauma, intolerance to end range positions, prior TIA, visual issues, UE neurological compromise)   []other:      Prognosis/Rehab Potential:      []Excellent   [x]Good    [x]Fair   []Poor    Tolerance of evaluation/treatment:    []Excellent   [x]Good    []Fair   []Poor     Physical Therapy Evaluation Complexity Justification  [x] A history of present problem with:  [] no personal factors and/or comorbidities that impact the plan of care;  [x]1-2 personal factors and/or comorbidities that impact the plan of care  []3 personal factors and/or comorbidities that impact the plan of care  [x] An examination of body systems using standardized tests and measures addressing any of the following: body structures and functions (impairments), activity limitations, and/or participation restrictions;:  [] a total of 1-2 or more elements   [x] a total of 3 or more elements   [] a total of 4 or more elements   [x] A clinical presentation with:  [] stable and/or uncomplicated characteristics   [x] evolving clinical presentation with changing characteristics  [] unstable and unpredictable characteristics;   [x] Clinical decision making of [] low, [x] moderate, [] high complexity using standardized patient assessment instrument and/or measurable assessment of functional outcome. [] EVAL (LOW) 52415 (typically 15 minutes face-to-face)  [x] EVAL (MOD) 45201 (typically 30 minutes face-to-face)  [] EVAL (HIGH) 89874 (typically 45 minutes face-to-face)  [] RE-EVAL     PLAN:   Today's Treatment:    [x] See flowsheet   [] Patient treated with canalith repositioning maneuver   [] Education materials provided on BPPV/Vestibular Dysfunction/Habituation   [] Precautions provided and patient to follow precautions for next 24 hours in regards to BPPV management   [x] Written home exercise instructions   [] Other:    Frequency/Duration:  2 days per week for 9 Weeks:  Interventions:  [x]  Therapeutic exercise including: strength training, ROM, for LEs, cervical spine & UEs  [x]  NMR activation and proprioception for BLEs, vestibular training/habituation, balance, coordination  [x]  Manual therapy as indicated via: canalith repositioning maneuvers, Dry Needling/IASTM, STM, PROM, Gr I-IV mobilizations, manipulation.    [x]  Modalities as needed that may include: thermal agents, E-stim, Biofeedback, US, iontophoresis as indicated  [x]  Gait training  [x]  Patient education on BPPV/vestibular function, balance, postural re-education, activity modification, progression of HEP. HEP instruction: Written HEP instructions provided and reviewed. Handout for seated smooth pursuits and saccades 15x 2x/day horiz, vertical and when ready diagonal  GOALS:  Patient stated goal: no dizziness  [] Progressing: [] Met: [] Not Met: [] Adjusted    Therapist goals for Patient:   Short Term Goals: To be achieved in: 2 weeks  1. Independent in HEP and progression per patient tolerance, in order to prevent re-injury. [] Progressing: [] Met: [] Not Met: [] Adjusted  2. Patient will have a decrease in dizziness/imbalance/symptoms to walk safely to facilitate improvement in movement, function, balance and ADLs as indicated by Functional Deficits. [] Progressing: [] Met: [] Not Met: [] Adjusted    Long Term Goals: To be achieved in: 9 weeks  1. Disability index score of 20% or less for the [address] [place] [address], [place] 45/56  to assist with reaching prior level of function. [] Progressing: [] Met: [] Not Met: [] Adjusted  2. Patient will demonstrate increased cervical AROM to WNL, joint mobility WNL to allow for proper joint functioning as indicated by patients Functional Deficits. [] Progressing: [] Met: [] Not Met: [] Adjusted  3. Patient will demonstrate negative oculomotor special testing/positional testing as indicated by patients Functional Deficits. [] Progressing: [] Met: [] Not Met: [] Adjusted  4. Patient will return to functional activities including walking safely without increased symptoms or restriction.    [] Progressing: [] Met: [] Not Met: [] Adjusted       Electronically signed by:  Suma Tracey, DPT [id number]

## 2020-10-02 NOTE — FLOWSHEET NOTE
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 Debbie Hanson of care signed (Y/N): []  Yes [x]  No Cosign req 10/2    Date of Patient follow up with Physician: prn     Progress Report: []  Yes  [x]  No     Date Range for reporting period:  Beginning: 10/2  Ending:     Progress report due (10 Rx/or 30 days whichever is less): visit #58     Recertification due (POC duration/ or 90 days whichever is less):      Visit # Insurance Allowable Auth required? Date Range     Pt only set up 2 visits at [address] [place] [address] []  Yes  [x]  No na     Latex Allergy:  [x]NO      []YES  Preferred Language for Healthcare:   [x]English       []other:    Functional Scale:        Date assessed: 10/2  %, KEMP 8    Pain level:  3-7/10 LB and le painful due to neuropathy    SUBJECTIVE:  See eval. Dizziness 5-8/10.     OBJECTIVE: See eval      RESTRICTIONS/PRECAUTIONS: hx vertigo spells, hx HTN, hx neurop B LE, osteop    Exercises/Interventions: gait belt and CGA/min A    Therapeutic Exercises (95336) Resistance / level Sets/sec Reps Notes          Seated cervical AROM stretches sb, rot, flex, ext   2x                  In future general ex  IB/HR/TR       Stairs hams and hip flexor stretch L/R    Up/down stairs in PT                            Therapeutic Activities (29644)       Pt left RW at home and daughter gave assist to PT, PT asked pt to bring RW in so we can adjust due to LBP with leaning on it                                   Neuromuscular Re-ed (56757)       SP, Saccades seated vertical and horiz   10ea dizzy   VOR seated horiz    5 dizzy   EC standing by wall with CGA  10 sec  dizzy   Trunk twist L/R     1/4 turns L/R   2    1 Dizzy    dizzy          gait       Manual Intervention (00713)       staci hallpike L/R negative       Cervical manual in future                                       Pt. Education: NEXT TIME TALK ABOUT HYDRATION  talked about vestibular rehab and components, handout given  -patient educated on diagnosis, prognosis and expectations for rehab  -all patient questions were answered    Home Exercise Program:  Handout for 1-2x/day seated 15 x horiz and vertical smooth pursuits and saccades, progress to diagonals when ready      Therapeutic Exercise and NMR EXR  [] (61181) Provided verbal/tactile cueing for activities related to strengthening, flexibility, endurance, ROM for improvements in  [] LE / Lumbar: LE, proximal hip, and core control with self care, mobility, lifting, ambulation. [] UE / Cervical: cervical, postural, scapular, scapulothoracic and UE control with self care, reaching, carrying, lifting, house/yardwork, driving, computer work.  [] (72323) Provided verbal/tactile cueing for activities related to improving balance, coordination, kinesthetic sense, posture, motor skill, proprioception to assist with   [] LE / lumbar: LE, proximal hip, and core control in self care, mobility, lifting, ambulation and eccentric single leg control.    [] UE / cervical: cervical, scapular, scapulothoracic and UE control with self care, reaching, carrying, lifting, house/yardwork, driving, computer work.   [] (87445) Therapist is in constant attendance of 2 or more patients providing skilled therapy interventions, but not providing any significant amount of measurable one-on-one time to either patient, for improvements in  [] LE / lumbar: LE, proximal hip, and core control in proximal hip and LE for self care, mobility, lifting, and ambulation/stair navigation    [] UE / Cervical: cervical, postural,  scapular, scapulothoracic and UE control with self care, reaching, carrying, lifting, house/yardwork, driving, computer work    Manual Treatments:  PROM / STM / Oscillations-Mobs:  G-I, II, III, IV (PA's, Inf., Post.)  [] (67767) Provided manual therapy to mobilize LE, proximal hip and/or LS spine soft tissue/joints for the purpose of modulating pain, promoting relaxation,  increasing ROM, reducing/eliminating soft tissue swelling/inflammation/restriction, improving soft tissue extensibility and allowing for proper ROM for normal function with   [] LE / lumbar: self care, mobility, lifting and ambulation. [] UE / Cervical: self care, reaching, carrying, lifting, house/yardwork, driving, computer work. Modalities:  [] (14937) Vasopneumatic compression: Utilized vasopneumatic compression to decrease edema / swelling for the purpose of improving mobility and quad tone / recruitment which will allow for increased overall function including but not limited to self-care, transfers, ambulation, and ascending / descending stairs. Modalities:  Prn in future  Charges:  Timed Code Treatment Minutes: 25   Total Treatment Minutes: 40     [] EVAL - LOW (60862)   [x] EVAL - MOD (78605)  [] EVAL - HIGH (19104)  [] RE-EVAL (60494)  [] PJ(47382) x       [] Ionto  [x] NMR (55221) x  2     [] Vaso  [] Manual (04094) x       [] Ultrasound  [] TA x        [] Mech Traction (56893)  [] Aquatic Therapy x     [] ES (un) (22417):   [] Home Management Training x  [] ES(attended) (15469)   [] Dry Needling 1-2 muscles (34541):  [] Dry Needling 3+ muscles (482857)  [] Group:      [] Other:     GOALS:   Patient stated goal: no dizziness  []? Progressing: []? Met: []? Not Met: []? Adjusted     Therapist goals for Patient:   Short Term Goals: To be achieved in: 2 weeks  1.  Independent in HEP and progression per patient tolerance, in order to prevent re-injury. []? Progressing: []? Met: []? Not Met: []? Adjusted  2. Patient will have a decrease in dizziness/imbalance/symptoms to walk safely to facilitate improvement in movement, function, balance and ADLs as indicated by Functional Deficits. []? Progressing: []? Met: []? Not Met: []? Adjusted     Long Term Goals: To be achieved in: 9 weeks  1. Disability index score of 20% or less for the [facility] [address], [place] 45/56  to assist with reaching prior level of function. []? Progressing: []? Met: []? Not Met: []? Adjusted  2. Patient will demonstrate increased cervical AROM to WNL, joint mobility WNL to allow for proper joint functioning as indicated by patients Functional Deficits. []? Progressing: []? Met: []? Not Met: []? Adjusted  3. Patient will demonstrate negative oculomotor special testing/positional testing as indicated by patients Functional Deficits. []? Progressing: []? Met: []? Not Met: []? Adjusted  4. Patient will return to functional activities including walking safely without increased symptoms or restriction. []? Progressing: []? Met: []? Not Met: []? Adjusted    Overall Progression Towards Functional goals/ Treatment Progress Update:  [] Patient is progressing as expected towards functional goals listed. [] Progression is slowed due to complexities/Impairments listed. [] Progression has been slowed due to co-morbidities.   [x] Plan just implemented, too soon to assess goals progression <30days   [] Goals require adjustment due to lack of progress  [] Patient is not progressing as expected and requires additional follow up with physician  [] Other    Persisting Functional Limitations/Impairments:  []Sleeping []Sitting               [x]Standing [x]Transfers        [x]Walking []Kneeling               [x]Stairs [x]Squatting / bending   [x]ADLs [x]Reaching  [x]Lifting  [x]Housework  [x]Driving []Job related tasks  []Sports/Recreation []Other:        ASSESSMENT: See eval  Treatment/Activity Tolerance:  [x] Patient able to complete tx [] Patient limited by fatigue  [] Patient limited by pain  [] Patient limited by other medical complications  [x] Other: constant dizziness and imbalance    Prognosis: [x] Good [x] Fair  [] Poor    Patient Requires Follow-up: [x] Yes  [] No    Plan for next treatment session:    PLAN: See eval. PT 2x / week for 9 weeks. [] Continue per plan of care [] Alter current plan (see comments)  [x] Plan of care initiated [] Hold pending MD visit [] Discharge    Electronically signed by: Mushtaq Maciel PT, DPT [id number]    Note: If patient does not return for scheduled/ recommended follow up visits, this note will serve as a discharge from care along with most recent update on progress.

## 2020-10-05 ENCOUNTER — TELEPHONE (OUTPATIENT)
Dept: FAMILY MEDICINE CLINIC | Age: 80
End: 2020-10-05

## 2020-10-05 RX ORDER — TOLTERODINE 2 MG/1
2 CAPSULE, EXTENDED RELEASE ORAL DAILY
Qty: 30 CAPSULE | Refills: 0 | Status: SHIPPED | OUTPATIENT
Start: 2020-10-05 | End: 2020-11-09

## 2020-10-05 RX ORDER — TOLTERODINE 2 MG/1
2 CAPSULE, EXTENDED RELEASE ORAL DAILY
Qty: 30 CAPSULE | Refills: 0 | Status: CANCELLED | OUTPATIENT
Start: 2020-10-05

## 2020-10-06 RX ORDER — CLOPIDOGREL BISULFATE 75 MG/1
75 TABLET ORAL DAILY
Qty: 90 TABLET | Refills: 0 | Status: SHIPPED | OUTPATIENT
Start: 2020-10-06 | End: 2020-12-03

## 2020-10-06 NOTE — TELEPHONE ENCOUNTER
clopidogrel (PLAVIX) 75 MG tablet  90 tablet  2  4/20/2020      Sig - Route:  Take 1 tablet by mouth daily       Pharmacy:  [address] [place] [address] Lina [phone] Jamir Verdin [phone]    Pharmacy Comments:

## 2020-10-08 ENCOUNTER — HOSPITAL ENCOUNTER (OUTPATIENT)
Dept: PHYSICAL THERAPY | Age: 80
Setting detail: THERAPIES SERIES
Discharge: HOME | End: 2020-10-08
Payer: MEDICARE

## 2020-10-08 PROCEDURE — 97530 THERAPEUTIC ACTIVITIES: CPT

## 2020-10-08 PROCEDURE — 97112 NEUROMUSCULAR REEDUCATION: CPT

## 2020-10-08 NOTE — FLOWSHEET NOTE
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 Mukul Anthony of care signed (Y/N): []  Yes [x]  No Cosign req 10/2    Date of Patient follow up with Physician: prn     Progress Report: []  Yes  [x]  No     Date Range for reporting period:  Beginning: 10/2  Ending:     Progress report due (10 Rx/or 30 days whichever is less): visit #25     Recertification due (POC duration/ or 90 days whichever is less):      Visit # Insurance Allowable Auth required? Date Range     Pt only set up 2 visits at [address] [place] [address] []  Yes  [x]  No na     Latex Allergy:  [x]NO      []YES  Preferred Language for Healthcare:   [x]English       []other:    Functional Scale:        Date assessed: 10/2  %, KEMP     Pain level:  3-7/10 LB and le painful due to neuropathy    SUBJECTIVE:  Patient reports that she has her dizziness when she is up on feet.      OBJECTIVE: See eval      RESTRICTIONS/PRECAUTIONS: hx vertigo spells, hx HTN, hx neurop B LE, osteop    Exercises/Interventions: gait belt and CGA/min A    Therapeutic Exercises (58929) Resistance / level Sets/sec Reps Notes          Seated cervical AROM stretches sb, rot, flex, ext   2x                  In future general ex  IB/HR/TR       Stairs hams and hip flexor stretch L/R    Up/down stairs in PT                            Therapeutic Activities (84046)       Pt left RW at home and daughter gave assist to PT, PT asked pt to bring RW providing skilled therapy interventions, but not providing any significant amount of measurable one-on-one time to either patient, for improvements in  [] LE / lumbar: LE, proximal hip, and core control in self care, mobility, lifting, ambulation and eccentric single leg control. [] UE / cervical: cervical, scapular, scapulothoracic and UE control with self care, reaching, carrying, lifting, house/yardwork, driving, computer work. NMR and Therapeutic Activities:    [x] (82994 or 51853) Provided verbal/tactile cueing for activities related to improving balance, coordination, kinesthetic sense, posture, motor skill, proprioception and motor activation to allow for proper function of   [] LE: / Lumbar core, proximal hip and LE with self care and ADLs  [x] UE / Cervical: cervical, postural, scapular, scapulothoracic and UE control with self care, carrying, lifting, driving, computer work.   [] (95856) Gait Re-education- Provided training and instruction to the patient for proper LE, core and proximal hip recruitment and positioning and eccentric body weight control with ambulation re-education including up and down stairs     Home Management Training / Self Care:  [] (16546) Provided self-care/home management training related to activities of daily living and compensatory training, and/or use of adaptive equipment for improvement with: ADLs and compensatory training, meal preparation, safety procedures and instruction in use of adaptive equipment, including bathing, grooming, dressing, personal hygiene, basic household cleaning and chores.      Home Exercise Program:    [x] (06619) Reviewed/Progressed HEP activities related to strengthening, flexibility, endurance, ROM of   [] LE / Lumbar: core, proximal hip and LE for functional self-care, mobility, lifting and ambulation/stair navigation   [x] UE / Cervical: cervical, postural, scapular, scapulothoracic and UE control with self care, reaching, carrying, lifting, house/yardwork, driving, computer work  [] (22043)Reviewed/Progressed HEP activities related to improving balance, coordination, kinesthetic sense, posture, motor skill, proprioception of   [] LE: core, proximal hip and LE for self care, mobility, lifting, and ambulation/stair navigation    [] UE / Cervical: cervical, postural,  scapular, scapulothoracic and UE control with self care, reaching, carrying, lifting, house/yardwork, driving, computer work    Manual Treatments:  PROM / STM / Oscillations-Mobs:  G-I, II, III, IV (PA's, Inf., Post.)  [] ([id number]) Provided manual therapy to mobilize LE, proximal hip and/or LS spine soft tissue/joints for the purpose of modulating pain, promoting relaxation,  increasing ROM, reducing/eliminating soft tissue swelling/inflammation/restriction, improving soft tissue extensibility and allowing for proper ROM for normal function with   [] LE / lumbar: self care, mobility, lifting and ambulation. [] UE / Cervical: self care, reaching, carrying, lifting, house/yardwork, driving, computer work. Modalities:  [] (76384) Vasopneumatic compression: Utilized vasopneumatic compression to decrease edema / swelling for the purpose of improving mobility and quad tone / recruitment which will allow for increased overall function including but not limited to self-care, transfers, ambulation, and ascending / descending stairs.        Modalities:  Prn in future  Charges:  Timed Code Treatment Minutes: 42   Total Treatment Minutes: 42     [] EVAL - LOW (74160)   [] EVAL - MOD (61355)  [] EVAL - HIGH (81729)  [] RE-EVAL (27248)  [] QO(61757) x       [] Ionto  [x] NMR (01638) x  2     [] Vaso  [] Manual (21665) x       [] Ultrasound  [x] TA x  1      [] Mech Traction (06661)  [] Aquatic Therapy x     [] ES (un) (47825):   [] Home Management Training x  [] ES(attended) (20506)   [] Dry Needling 1-2 muscles (04886):  [] Dry Needling 3+ muscles (573946)  [] Group:      [] Other:     GOALS: Patient stated goal: no dizziness  []? Progressing: []? Met: []? Not Met: []? Adjusted     Therapist goals for Patient:   Short Term Goals: To be achieved in: 2 weeks  1. Independent in HEP and progression per patient tolerance, in order to prevent re-injury. []? Progressing: []? Met: []? Not Met: []? Adjusted  2. Patient will have a decrease in dizziness/imbalance/symptoms to walk safely to facilitate improvement in movement, function, balance and ADLs as indicated by Functional Deficits. []? Progressing: []? Met: []? Not Met: []? Adjusted     Long Term Goals: To be achieved in: 9 weeks  1. Disability index score of 20% or less for the [address] [place] [address] 45/56  to assist with reaching prior level of function. []? Progressing: []? Met: []? Not Met: []? Adjusted  2. Patient will demonstrate increased cervical AROM to WNL, joint mobility WNL to allow for proper joint functioning as indicated by patients Functional Deficits. []? Progressing: []? Met: []? Not Met: []? Adjusted  3. Patient will demonstrate negative oculomotor special testing/positional testing as indicated by patients Functional Deficits. []? Progressing: []? Met: []? Not Met: []? Adjusted  4. Patient will return to functional activities including walking safely without increased symptoms or restriction. []? Progressing: []? Met: []? Not Met: []? Adjusted    Overall Progression Towards Functional goals/ Treatment Progress Update:  [] Patient is progressing as expected towards functional goals listed. [] Progression is slowed due to complexities/Impairments listed. [] Progression has been slowed due to co-morbidities.   [x] Plan just implemented, too soon to assess goals progression <30days   [] Goals require adjustment due to lack of progress  [] Patient is not progressing as expected and requires additional follow up with physician  [] Other    Persisting Functional

## 2020-10-12 RX ORDER — ALLOPURINOL 300 MG/1
TABLET ORAL
Qty: 90 TABLET | Refills: 1 | Status: ON HOLD | OUTPATIENT
Start: 2020-10-12 | End: 2020-11-08 | Stop reason: HOSPADM

## 2020-10-14 ENCOUNTER — APPOINTMENT (OUTPATIENT)
Dept: PHYSICAL THERAPY | Age: 80
End: 2020-10-14
Payer: MEDICARE

## 2020-10-15 ENCOUNTER — HOSPITAL ENCOUNTER (OUTPATIENT)
Dept: PHYSICAL THERAPY | Age: 80
Setting detail: THERAPIES SERIES
Discharge: HOME | End: 2020-10-15
Payer: MEDICARE

## 2020-10-15 PROCEDURE — 97112 NEUROMUSCULAR REEDUCATION: CPT

## 2020-10-15 PROCEDURE — 97530 THERAPEUTIC ACTIVITIES: CPT

## 2020-10-15 NOTE — FLOWSHEET NOTE
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 Oc Henderson of care signed (Y/N): []  Yes [x]  No Cosign req 10/2    Date of Patient follow up with Physician: prn     Progress Report: []  Yes  [x]  No     Date Range for reporting period:  Beginning: 10/2  Ending:     Progress report due (10 Rx/or 30 days whichever is less): visit #55     Recertification due (POC duration/ or 90 days whichever is less):      Visit # Insurance Allowable Auth required? Date Range   3/18  Pt only set up 2 visits at [address] [place] [address] []  Yes  [x]  No na     Latex Allergy:  [x]NO      []YES  Preferred Language for Healthcare:   [x]English       []other:    Functional Scale:        Date assessed: 10/2  %, KEMP 8/    Pain level:  3-7/10 LB and le painful due to neuropathy    SUBJECTIVE:  Patient reports that her dizziness has improved some in the last week.  This morning however her back is bothering her      OBJECTIVE: See eval      RESTRICTIONS/PRECAUTIONS: hx vertigo spells, hx HTN, hx neurop B LE, osteop    Exercises/Interventions: gait belt and CGA/min A    Therapeutic Exercises (57898) Resistance / level Sets/sec Reps Notes          Seated cervical AROM stretches sb, rot, flex, ext   2x                  In future general ex  IB/HR/TR       Stairs hams and hip flexor stretch L/R    Up/down stairs in PT                            Therapeutic Activities (73728)       Pt left RW at home and daughter gave assist to PT, PT asked pt to bring RW in so we can adjust due to LBP with leaning on it                                   Neuromuscular Re-ed (83827)       SP, Saccades seated vertical and horiz   10ea 1015 off balance felt she wanted to lean back    10/8 dizziness after the exercises   VOR seated horiz /  Vert    2  2 10  10x 10/8 dizzy   10/15 Limited head movement/ vert   EC standing by wall with CGA  EO FT  Sways  A/P, M/L   10 sec     10x Mild LOB     10/15 CGA   Trunk twist L/R     1/4 turns L/R   5    2 Mild balance LOB    Off balance to left   Picking up cones(5)   X 2 laps 10/15 Added   gait       Manual Intervention (92640)       staci hallpike L/R negative       Cervical manual in future                                       Pt. Education:    Discussed and educated pt on hydration, reducing caffeine, reducing salt intake. NEXT TIME TALK ABOUT HYDRATION  talked about vestibular rehab and components, handout given  -patient educated on diagnosis, prognosis and expectations for rehab  -all patient questions were answered    Home Exercise Program:  Handout for 1-2x/day seated 15 x horiz and vertical smooth pursuits and saccades, progress to diagonals when ready      Therapeutic Exercise and NMR EXR  [] (41918) Provided verbal/tactile cueing for activities related to strengthening, flexibility, endurance, ROM for improvements in  [] LE / Lumbar: LE, proximal hip, and core control with self care, mobility, lifting, ambulation. [] UE / Cervical: cervical, postural, scapular, scapulothoracic and UE control with self care, reaching, carrying, lifting, house/yardwork, driving, computer work.  [] (38226) Provided verbal/tactile cueing for activities related to improving balance, coordination, kinesthetic sense, posture, motor skill, proprioception to assist with   [] LE / lumbar: LE, proximal hip, and core control in self care, mobility, lifting, ambulation and eccentric single leg control.    [] UE / cervical: cervical, scapular, scapulothoracic and UE control with self care, reaching, carrying, lifting, house/yardwork, driving, computer work.   [] (29373) Therapist is in constant attendance of 2 or more patients providing skilled therapy interventions, but not providing any significant amount of measurable one-on-one time to either patient, for improvements in  [] LE / lumbar: LE, proximal hip, and core control in self care, mobility, lifting, ambulation and eccentric single leg control. [] UE / cervical: cervical, scapular, scapulothoracic and UE control with self care, reaching, carrying, lifting, house/yardwork, driving, computer work. NMR and Therapeutic Activities:    [x] (49239 or 69153) Provided verbal/tactile cueing for activities related to improving balance, coordination, kinesthetic sense, posture, motor skill, proprioception and motor activation to allow for proper function of   [] LE: / Lumbar core, proximal hip and LE with self care and ADLs  [x] UE / Cervical: cervical, postural, scapular, scapulothoracic and UE control with self care, carrying, lifting, driving, computer work.   [] (30803) Gait Re-education- Provided training and instruction to the patient for proper LE, core and proximal hip recruitment and positioning and eccentric body weight control with ambulation re-education including up and down stairs     Home Management Training / Self Care:  [] (22843) Provided self-care/home management training related to activities of daily living and compensatory training, and/or use of adaptive equipment for improvement with: ADLs and compensatory training, meal preparation, safety procedures and instruction in use of adaptive equipment, including bathing, grooming, dressing, personal hygiene, basic household cleaning and chores.      Home Exercise Program:    [x] (17364) Reviewed/Progressed HEP activities related to strengthening, flexibility, endurance, ROM of   [] LE / Lumbar: core, (93871)  [] Aquatic Therapy x     [] ES (un) (32097):   [] Home Management Training x  [] ES(attended) (70399)   [] Dry Needling 1-2 muscles (93290):  [] Dry Needling 3+ muscles (793287)  [] Group:      [] Other:     GOALS:   Patient stated goal: no dizziness  []? Progressing: []? Met: []? Not Met: []? Adjusted     Therapist goals for Patient:   Short Term Goals: To be achieved in: 2 weeks  1. Independent in HEP and progression per patient tolerance, in order to prevent re-injury. []? Progressing: []? Met: []? Not Met: []? Adjusted  2. Patient will have a decrease in dizziness/imbalance/symptoms to walk safely to facilitate improvement in movement, function, balance and ADLs as indicated by Functional Deficits. []? Progressing: []? Met: []? Not Met: []? Adjusted     Long Term Goals: To be achieved in: 9 weeks  1. Disability index score of 20% or less for the [address] [place] [address], [place] 45/56  to assist with reaching prior level of function. []? Progressing: []? Met: []? Not Met: []? Adjusted  2. Patient will demonstrate increased cervical AROM to WNL, joint mobility WNL to allow for proper joint functioning as indicated by patients Functional Deficits. []? Progressing: []? Met: []? Not Met: []? Adjusted  3. Patient will demonstrate negative oculomotor special testing/positional testing as indicated by patients Functional Deficits. []? Progressing: []? Met: []? Not Met: []? Adjusted  4. Patient will return to functional activities including walking safely without increased symptoms or restriction. []? Progressing: []? Met: []? Not Met: []? Adjusted    Overall Progression Towards Functional goals/ Treatment Progress Update:  [] Patient is progressing as expected towards functional goals listed. [] Progression is slowed due to complexities/Impairments listed. [] Progression has been slowed due to co-morbidities.   [x] Plan just implemented, too soon to assess goals progression <30days   [] Goals require adjustment due to

## 2020-10-19 ENCOUNTER — HOSPITAL ENCOUNTER (OUTPATIENT)
Dept: PHYSICAL THERAPY | Age: 80
Setting detail: THERAPIES SERIES
Discharge: HOME | End: 2020-10-19
Payer: MEDICARE

## 2020-10-19 PROCEDURE — 97530 THERAPEUTIC ACTIVITIES: CPT

## 2020-10-19 PROCEDURE — 97110 THERAPEUTIC EXERCISES: CPT

## 2020-10-19 PROCEDURE — 97112 NEUROMUSCULAR REEDUCATION: CPT

## 2020-10-19 NOTE — FLOWSHEET NOTE
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 Atiya Soriano of care signed (Y/N): [x]  Yes []  No Cosign 10/2    Date of Patient follow up with Physician: prn     Progress Report: [x]  Yes  []  No     Date Range for reporting period:  Beginning: 10/2  Ending: 10/19/20    Progress report due (10 Rx/or 30 days whichever is less): visit #97     Recertification due (POC duration/ or 90 days whichever is less):      Visit # Insurance Allowable Auth required? Date Range      Med nec []  Yes  [x]  No na     Latex Allergy:  [x]NO      []YES  Preferred Language for Healthcare:   [x]English       []other:    Functional Scale:        Date assessed: 10/19  DHI 64%, KEMP 27    Pain level:  3-7/10 LB and LE painful due to neuropathy    SUBJECTIVE:  Pt reports she has a lot of pain in LB and legs today 7/10. Patient reports that her dizziness has improved some in the last week. Dizziness/ imbalance  0-4/10.     OBJECTIVE: 10/19/20:       RESTRICTIONS/PRECAUTIONS: hx vertigo spells, hx HTN, hx neurop B LE, osteop    Exercises/Interventions: gait belt and CGA/min A    Therapeutic Exercises (83543) Resistance / level Sets/sec Reps Notes          Seated cervical AROM stretches sb, rot, flex, ext   2x                  In future general ex  IB/HR/TR       Stairs hams and hip flexor stretch L/R    Up/down stairs in PT                            Therapeutic Activities (26199)       Pt left RW at home and daughter gave assist to PT, PT asked pt to bring RW in so we can adjust due to LBP with leaning on it                                   Neuromuscular Re-ed (54164)       SP, Saccades seated vertical and horiz   10ea 1015 off balance felt she wanted to lean back    10/8 dizziness after the exercises   VOR seated horiz /  Vert    2  2 10  10x 10/8 dizzy   10/15 Limited head movement/ vert   EC standing by wall with CGA  EO FT  Sways  A/P, M/L EC  10 sec     10x Mild LOB     10/15 CGA   Trunk twist L/R     1/4 turns L/R   5    2 Mild balance LOB    Off balance to left   Picking up cones(5)    10/15 Added   gait       Manual Intervention ([phone])                                                     Pt. Education:    10/19: pt working on water/hydration. Talked with daughter about plan and pt wishes. Discussed and educated pt on hydration, reducing caffeine, reducing salt intake. talked about vestibular rehab and components, handout given  -patient educated on diagnosis, prognosis and expectations for rehab  -all patient questions were answered    Home Exercise Program: 10/19:     Access Code: [email or web address]   URL: [email or web address]   Date: 10/19/2020   Prepared by:  Gill Zheng     Exercises   Gastroc Stretch on [place] - 2 reps - 20 hold - 1x daily - 7x weekly   Standing Heel Raise with Support - 10 reps - 1x daily - 7x weekly   Standing Ankle Dorsiflexion with Table Support - 10 reps - 1x daily - 7x weekly   Standing Ankle Dorsiflexion with Table Support - 10 reps - 1x daily - 7x weekly   Standing Hip Abduction - 10 reps - 1x daily - 7x weekly   Standing Hip Extension with Chair - 10 reps - 1x daily - 7x weekly   Standing Marching - 10 reps - 1x daily - 7x weekly   Squat with Counter Support - 10 reps - 1x daily - 7x weekly       Handout for 1-2x/day seated 15 x horiz and vertical smooth pursuits and saccades, progress to diagonals when ready      Therapeutic Exercise and NMR EXR  [x] (79875) Provided verbal/tactile cueing for activities related to strengthening, flexibility, endurance, ROM for improvements in  [x] LE / Lumbar: LE, proximal hip, and core control with self care, mobility, lifting, ambulation. [] UE / Cervical: cervical, postural, scapular, scapulothoracic and UE control with self care, reaching, carrying, lifting, house/yardwork, driving, computer work.  [] (26984) Provided verbal/tactile cueing for activities related to improving balance, coordination, kinesthetic sense, posture, motor skill, proprioception to assist with   [] LE / lumbar: LE, proximal hip, and core control in self care, mobility, lifting, ambulation and eccentric single leg control. [] UE / cervical: cervical, scapular, scapulothoracic and UE control with self care, reaching, carrying, lifting, house/yardwork, driving, computer work.   [] (42058) Therapist is in constant attendance of 2 or more patients providing skilled therapy interventions, but not providing any significant amount of measurable one-on-one time to either patient, for improvements in  [] LE / lumbar: LE, proximal hip, and core control in self care, mobility, lifting, ambulation and eccentric single leg control. [] UE / cervical: cervical, scapular, scapulothoracic and UE control with self care, reaching, carrying, lifting, house/yardwork, driving, computer work.      NMR and Therapeutic Activities:    [x] (63267 or 97383) Provided verbal/tactile cueing for activities related to improving balance, coordination, kinesthetic sense, posture, motor skill, proprioception and motor activation to allow for proper function of   [x] LE: / Lumbar core, proximal hip and LE with self care and ADLs  [x] UE / Cervical: cervical, postural, scapular, scapulothoracic and UE control with self care, carrying, lifting, driving, computer work.   [] (81185) Gait Re-education- Provided training and instruction to the patient for proper LE, core and proximal hip recruitment and positioning and eccentric body weight control with ambulation re-education including up and down stairs     Home Management Training / Self Care:  [] (59855) Provided self-care/home management training related to activities of daily living and compensatory training, and/or use of adaptive equipment for improvement with: ADLs and compensatory training, meal preparation, safety procedures and instruction in use of adaptive equipment, including bathing, grooming, dressing, personal hygiene, basic household cleaning and chores. Home Exercise Program:    [x] (10037) Reviewed/Progressed HEP activities related to strengthening, flexibility, endurance, ROM of   [x] LE / Lumbar: core, proximal hip and LE for functional self-care, mobility, lifting and ambulation/stair navigation   [x] UE / Cervical: cervical, postural, scapular, scapulothoracic and UE control with self care, reaching, carrying, lifting, house/yardwork, driving, computer work  [] (21527)Reviewed/Progressed HEP activities related to improving balance, coordination, kinesthetic sense, posture, motor skill, proprioception of   [] LE: core, proximal hip and LE for self care, mobility, lifting, and ambulation/stair navigation    [] UE / Cervical: cervical, postural,  scapular, scapulothoracic and UE control with self care, reaching, carrying, lifting, house/yardwork, driving, computer work    Manual Treatments:  PROM / STM / Oscillations-Mobs:  G-I, II, III, IV (PA's, Inf., Post.)  [] (72865) Provided manual therapy to mobilize LE, proximal hip and/or LS spine soft tissue/joints for the purpose of modulating pain, promoting relaxation,  increasing ROM, reducing/eliminating soft tissue swelling/inflammation/restriction, improving soft tissue extensibility and allowing for proper ROM for normal function with   [] LE / lumbar: self care, mobility, lifting and ambulation.     [] UE / Cervical: self care, reaching, carrying, lifting, house/yardwork, driving, computer work. Modalities:  [] (75890) Vasopneumatic compression: Utilized vasopneumatic compression to decrease edema / swelling for the purpose of improving mobility and quad tone / recruitment which will allow for increased overall function including but not limited to self-care, transfers, ambulation, and ascending / descending stairs. Modalities:  Prn in future  Charges:  Timed Code Treatment Minutes: 44   Total Treatment Minutes: 44     [] EVAL - LOW (66612)   [] EVAL - MOD (62796)  [] EVAL - HIGH (38239)  [] RE-EVAL (13849)  [x] FR(88697) x1       [] Ionto  [x] NMR (19247) x  1     [] Vaso  [] Manual (24553) x       [] Ultrasound  [x] TA x  1      [] Mech Traction (47265)  [] Aquatic Therapy x     [] ES (un) (22007):   [] Home Management Training x  [] ES(attended) (79582)   [] Dry Needling 1-2 muscles (18865):  [] Dry Needling 3+ muscles (225405)  [] Group:      [] Other:     GOALS:   Patient stated goal: no dizziness  [x]? Progressing: []? Met: []? Not Met: []? Adjusted     Therapist goals for Patient:   Short Term Goals: To be achieved in: 2 weeks  1. Independent in HEP and progression per patient tolerance, in order to prevent re-injury. [x]? Progressing: []? Met: []? Not Met: []? Adjusted  2. Patient will have a decrease in dizziness/imbalance/symptoms to walk safely to facilitate improvement in movement, function, balance and ADLs as indicated by Functional Deficits. [x]? Progressing: []? Met: []? Not Met: []? Adjusted     Long Term Goals: To be achieved in: 9 weeks  1. Disability index score of 20% or less for the [facility] 45/56  to assist with reaching prior level of function. [x]? Progressing: []? Met: []? Not Met: []? Adjusted  2. Patient will demonstrate increased cervical AROM to WNL, joint mobility WNL to allow for proper joint functioning as indicated by patients Functional Deficits. [x]? Progressing: []? Met: []? Not Met: []? Adjusted  3.  Patient will demonstrate negative oculomotor special testing/positional testing as indicated by patients Functional Deficits. [x]? Progressing: []? Met: []? Not Met: []? Adjusted  4. Patient will return to functional activities including walking safely without increased symptoms or restriction. [x]? Progressing: []? Met: []? Not Met: []? Adjusted    Overall Progression Towards Functional goals/ Treatment Progress Update:  [] Patient is progressing as expected towards functional goals listed. [] Progression is slowed due to complexities/Impairments listed. [x] Progression has been slowed due to co-morbidities. [] Plan just implemented, too soon to assess goals progression <30days   [] Goals require adjustment due to lack of progress  [] Patient is not progressing as expected and requires additional follow up with physician  [] Other    Persisting Functional Limitations/Impairments:  []Sleeping []Sitting               [x]Standing [x]Transfers        [x]Walking []Kneeling               [x]Stairs [x]Squatting / bending   [x]ADLs [x]Reaching  [x]Lifting  [x]Housework  [x]Driving []Job related tasks  []Sports/Recreation []Other:        ASSESSMENT:    Pt wants to do HEP, dizziness better and main concern is LE strength for walking. Progressed HEP and talked with daughter about plan. All in agreement. Treatment/Activity Tolerance:  [x] Patient able to complete tx [] Patient limited by fatigue  [] Patient limited by pain  [] Patient limited by other medical complications  []     Prognosis: [x] Good [x] Fair  [] Poor    Patient Requires Follow-up: [] Yes  [x] No    Plan for next treatment session:    PLAN: Pt wants to do HEP and not come now. Pt will have MD write new referral if she wants to come back.   [] Continue per plan of care [] Alter current plan (see comments)  [] Plan of care initiated [] Hold pending MD visit [x] Discharge    Electronically signed by: Isa Velasquez PT, DPT [id number]    Note: If patient does not return for scheduled/ recommended follow up visits, this note will serve as a discharge from care along with most recent update on progress.

## 2020-10-22 ENCOUNTER — OFFICE VISIT (OUTPATIENT)
Dept: CARDIOLOGY CLINIC | Age: 80
End: 2020-10-22
Payer: MEDICARE

## 2020-10-22 ENCOUNTER — NURSE ONLY (OUTPATIENT)
Dept: CARDIOLOGY CLINIC | Age: 80
End: 2020-10-22
Payer: MEDICARE

## 2020-10-22 VITALS
BODY MASS INDEX: 23.41 KG/M2 | HEIGHT: 63 IN | WEIGHT: 132.12 LBS | HEART RATE: 70 BPM | OXYGEN SATURATION: 96 % | DIASTOLIC BLOOD PRESSURE: 70 MMHG | SYSTOLIC BLOOD PRESSURE: 108 MMHG

## 2020-10-22 PROCEDURE — G8427 DOCREV CUR MEDS BY ELIG CLIN: HCPCS | Performed by: NURSE PRACTITIONER

## 2020-10-22 PROCEDURE — 93280 PM DEVICE PROGR EVAL DUAL: CPT | Performed by: INTERNAL MEDICINE

## 2020-10-22 PROCEDURE — 1036F TOBACCO NON-USER: CPT | Performed by: NURSE PRACTITIONER

## 2020-10-22 PROCEDURE — 4040F PNEUMOC VAC/ADMIN/RCVD: CPT | Performed by: NURSE PRACTITIONER

## 2020-10-22 PROCEDURE — G8399 PT W/DXA RESULTS DOCUMENT: HCPCS | Performed by: NURSE PRACTITIONER

## 2020-10-22 PROCEDURE — G8420 CALC BMI NORM PARAMETERS: HCPCS | Performed by: NURSE PRACTITIONER

## 2020-10-22 PROCEDURE — 1090F PRES/ABSN URINE INCON ASSESS: CPT | Performed by: NURSE PRACTITIONER

## 2020-10-22 PROCEDURE — 93000 ELECTROCARDIOGRAM COMPLETE: CPT | Performed by: NURSE PRACTITIONER

## 2020-10-22 PROCEDURE — G8484 FLU IMMUNIZE NO ADMIN: HCPCS | Performed by: NURSE PRACTITIONER

## 2020-10-22 PROCEDURE — 1123F ACP DISCUSS/DSCN MKR DOCD: CPT | Performed by: NURSE PRACTITIONER

## 2020-10-22 PROCEDURE — 99214 OFFICE O/P EST MOD 30 MIN: CPT | Performed by: NURSE PRACTITIONER

## 2020-10-22 NOTE — PROGRESS NOTES
[address]   Electrophysiology  TIFFANIE Kaur-CNP  Attending EP: Dr. Ban Carias   Date: 10/22/2020  I had the privilege of visiting Heather Galvin in the office. Chief Complaint:   Chief Complaint   Patient presents with    Ambulatory Cardiac Monitoring    6 Month Follow-Up     History of Present Illness: History obtained from patient and medical record. Heather Galvin is [de-identified] y.o. female with a past medical history of HTN, HLD, CKD, COPD, CHF, CAD s/p CABG, SSS s/p dual chamber PPM (4/24/2009) and atrial fibrillation. 8/2017 she reported palpitations and ECG revealed atrial flutter/RVR. S/p DCCV 8/2/2017. She follows with Dr. Ismael Connor for severe restrictive lung disease. Interval history: Today, Heather Galvin is being seen for follow up for afib and device monitoring. She has been doing well from cardiac perspective. She has been feeling fatigued over the last month. Interrogation shows AF on on 10/18 and 10/20, no other episodes to explain fatigue. Denies CP, palpitations, or worsening SOB. She has chronic SOB that she feels has improved. Admits to right leg pain and neuropathy that has worsened and she has appt with PVP in November. Denies bleeding or dark/tarry stools. She does not check her BP at home, she does monitor sodium intake. Denies having chest pain, palpitations, shortness of breath, orthopnea/PND, cough, or dizziness at the time of this visit. With regard to medication therapy the patient has been compliant with prescribed regimen. She has tolerated therapy to date. Allergies:   Allergies   Allergen Reactions    Aspirin Nausea Only    Ativan [Lorazepam] Other (See Comments)     hallucinations    Diltiazem Anaphylaxis    Sulfa Antibiotics Rash and Hives    Dabigatran Nausea Only     And indigestion  And indigestion    Aka Pradaxa    Lipitor [Atorvastatin] Other (See Comments)     Muscle pains    Mysoline [Primidone]     Nsaids     Other Other (See Comments)     Nitroglycerin patches causes severe headaches     Home Medications:  Prior to Visit Medications    Medication Sig Taking?  Authorizing Provider   torsemide (DEMADEX) 10 MG tablet TAKE 1 TABLET EVERY OTHER DAY ALTERNATING WITH  2  TABLETS EVERY OTHER DAY  TIFFANIE Duque - CNS   allopurinol (ZYLOPRIM) 300 MG tablet TAKE 1 TABLET EVERY DAY  Zaki Phelps MD   clopidogrel (PLAVIX) 75 MG tablet Take 1 tablet by mouth daily  Zaki Phelps MD   tolterodine (DETROL LA) 2 MG extended release capsule Take 1 capsule by mouth daily  Zaki Phelps MD   traZODone (DESYREL) 50 MG tablet Take 1 tablet by mouth nightly  Zaki Phelps MD   levothyroxine (SYNTHROID) 112 MCG tablet TAKE 1 TABLET EVERY DAY  Zaki Phelps MD   vitamin D (ERGOCALCIFEROL) 1.25 MG (03004 UT) CAPS capsule Take 1 capsule by mouth once a week  Zaki Phelps MD   meclizine (ANTIVERT) 12.5 MG tablet Take 1 tablet by mouth 3 times daily as needed for Dizziness or Nausea  Ofelia Mckeon MD   metoprolol succinate (TOPROL XL) 100 MG extended release tablet Take 1 tablet by mouth 2 times daily  Zaki Phelps MD   XARELTO 15 MG TABS tablet TAKE 1 TABLET DAILY WITH BREAKFAST  TIFFANIE Díaz - CNP   allopurinol (ZYLOPRIM) 100 MG tablet Take 1 tablet by mouth daily  Zaki Phelps MD   topiramate (TOPAMAX) 50 MG tablet Take 1 tablet by mouth 2 times daily  Elis Ugalde MD      Past Medical History:  Past Medical History:   Diagnosis Date    Allergic rhinitis, cause unspecified     Arthritis     Atrial fibrillation ([address]     Bronchopneumonia     CAD (coronary artery disease)     stent:  post cataract surgery (CABG)    Cerebral artery occlusion with cerebral infarction ([address]     TIA    Chronic gouty arthropathy     Chronic kidney disease     Congestive heart failure, unspecified     Degeneration of cervical intervertebral disc     Essential and other specified forms of tremor     Gout     HIGH CHOLESTEROL     History of CVA (cerebrovascular accident)     Hx of blood clots     Hypertension     Influenza 12/23/2017    Mitral valve stenosis and aortic valve insufficiency     Movement disorder     back problems    Pacemaker     Peptic ulcer, unspecified site, unspecified as acute or chronic, without mention of hemorrhage, perforation, or obstruction     Thyroid disease     Unspecified disorder of kidney and ureter     Unspecified hypothyroidism      Past Surgical History:    has a past surgical history that includes back surgery; Cholecystectomy; Cardiac surgery; Coronary artery bypass graft (1987); shoulder surgery; Cardiac catheterization (7/2012); hip surgery (Left, 03/16/2017); joint replacement; Cardiac catheterization (08/07/2018); Percutaneous Transluminal Coronary Angio (11/04/2014); pr inject anes/steroid foramen lumbar/sacral w img guide ,1 level (Right, 12/3/2018); Femoral-femoral Bypass Graft (N/A, 8/22/2019); and femoral bypass (Left, 8/22/2019). Social History:  Reviewed. reports that she quit smoking about 33 years ago. Her smoking use included cigarettes. She has a 28.00 pack-year smoking history. She has never used smokeless tobacco. She reports previous alcohol use. She reports that she does not use drugs. Family History:  Reviewed. family history includes Cancer in her maternal grandmother, paternal grandmother, and sister; Diabetes in her brother and maternal uncle; Heart Disease in her brother, maternal aunt, and sister; Hypertension in her brother and paternal aunt; Stroke in her maternal aunt. Denies family history of sudden cardiac death, arrhythmia, premature CAD    Review of System:  · Constitutional: No fevers, chills, weight changes or weakness  · HEENT: No visual changes. No mouth sores or sore throat. · Cardiovascular: denies chest pain, admits to dyspnea on exertion, denies palpitations or denies loss of consciousness.  No cough, hemoptysis, denies pleuritic pain, or phlebitis. denies dizziness. · Respiratory: denies cough or wheezing. No hematemesis. · Gastrointestinal: No abdominal pain, blood in stools. · Genitourinary: No dysuria, urgency or hematuria. · Musculoskeletal: admits to chronic gait disturbance, No focal muscle weakness. · Integumentary: No rash or pruritis. · Neurological: No headache, change in muscle strength, numbness or tingling. · Psychiatric: No confusion, anxiety, or depression. · Endocrine: No temperature intolerance. No excessive thirst, fluid intake, or urination. · Hem/Lymph: Denies abnormal bruising or bleeding. Physical Examination:  There were no vitals filed for this visit. Wt Readings from Last 3 Encounters:   09/30/20 133 lb 4 oz (60.4 kg)   09/23/20 133 lb (60.3 kg)   09/18/20 133 lb 4 oz (60.4 kg)      Constitutional: Cooperative and in no apparent distress, and appears well nourished   Skin: Warm and pink; no pallor, cyanosis, bruising, or clubbing   HEENT: Symmetric and normocephalic. Conjunctiva pink with clear sclera. Mucus membranes pink and moist. No visible masses/goiter   Respiratory: Respirations symmetric and unlabored. Lungs clear to auscultation bilaterally, no wheezing, rhonchi, or crackles.  Cardiovascular:  regular rate and rhythm. S1 & S2 present without murmurs, rubs, or gallops. Peripheral pulses 2+, capillary refill < 3 seconds. negative elevation of JVP. No peripheral edema   Gastrointestinal: Abdomen soft and round.  Musculoskeletal:  No focal weakness.  Neurological/Psych: Awake and orientated to person, place and time. Calm affect, appropriate mood.      Pertinent labs, diagnostic, device, and imaging results reviewed as a part of this visit    LABS    CBC:   Lab Results   Component Value Date    WBC 10.1 08/04/2020    HGB 13.7 08/04/2020    HCT 43.7 08/04/2020    MCV 85.3 08/04/2020     08/04/2020     BMP:   Lab Results   Component Value Date CREATININE 1.3 (H) 08/04/2020    BUN 32 (H) 08/04/2020     08/04/2020    K 5.2 (H) 08/04/2020     08/04/2020    CO2 22 08/04/2020     CrCl cannot be calculated (Unknown ideal weight.). Lab Results   Component Value Date    BNP 3,647 07/17/2019    BNP 1,320 01/18/2019     02/19/2013       Thyroid:   Lab Results   Component Value Date    TSH 3.98 03/25/2020    D2IWKRU 7.3 10/03/2018     Lipid Panel:   Lab Results   Component Value Date    CHOL 120 03/26/2020    HDL 29 03/26/2020    HDL 31 06/06/2012    TRIG 97 03/26/2020     LFTs:  Lab Results   Component Value Date    ALT 18 03/25/2020    AST 21 03/25/2020    ALKPHOS 109 03/25/2020    BILITOT 0.6 03/25/2020     Coags:   Lab Results   Component Value Date    PROTIME 14.9 (H) 08/04/2020    INR 1.28 (H) 08/04/2020    APTT 39.4 (H) 02/06/2020     ECG: 10/22/2020 AP HR 71, , , QTc 439    Echo: 4/30/2020  Summary   Limited echo   Left ventricular cavity size is normal. Normal left ventricular wall   thickness. Left ventricular function appears to be reduced with an estimated   ejection fraction of 45%. Diffuse hypokinesis. 7/19/2019  Summary  Left ventricle - normal size, thickness, reduced function with EF of 45%  LV wall motion - diffuse hypokinesis. Mitral valve - thickened, annular calcification, moderate regurgitation  Aortic valve - thickened, calcified, mild regurgitation  Tricuspid valve - mild regurgitation with PASP of 25mmHg  Pulmonic valve - trivial regurgitation  Biatrial enlargement  Pacemaker / ICD lead is visualized in the right heart.     Cath: 5/20/2019  Findings:                      LM       Normal              LAD     50% ostial, mid 100%              Cx        40% OM1 at bifurcation              RCA     Mid 100%              LVG     Not performed              EDP     15 mmHg              L-LAD  Patent              S-PDA Known occluded  Severe Ca++:None  Post Cath Dx:Stable CAD, no apparent culprit for NSTEMI  Intervention:  None  Med Rec:        Continue aggressive med tx                          Continue plavix, no asa due to allergy  Viability  Summary     Normal rest myocardial perfusion.     No fixed defect to suggest scar.     Normal perfusion c/w normal viability. Assessment:  Paroxysmal Atrial Fibrillation/flutter/AT  - ECG today shows AP  - Interrogation shows AF on 10/18  rate controlled  - On Toprol 100 mg daily   - [id number] score: at least 10 (age, gender, HTN, CAD, CHF) ; [id number] Vasc score and anticoagulation discussed. High risk for stroke and thromboembolism. Anticoagulation is recommended.   ~ On Xarelto 15 mg daily, no s/s of bleeding  - Afib risk factors including age, HTN, obesity, inactivity and UVALDO were discussed with patient. Risk factor modification recommended   ~ TSH 3.98 (3/25/2020)     - Treatment options including cardioversion, rate control strategy, antiarrhythmics, anticoagulation and possible ablation were discussed with patient. Risks, benefits and alternative of each treatment options were explained. All questions answered    ~ Not a candidate for AAD therapy due to extensive CAD and severe COPD    ~ S/p DCCV    ~ S/p RFCA of afib at [address] [place] [address] (2009, Dr. La Schultz)  SSS/Implantable device   - S/p biotronik dual chamber PPM (4/24/2009)   - The CIED was interrogated and I reviewed all data    - Device interrogation today shows JUSTIN 3 months, AT/AF 4%, AP 78,  8   - Needs generator change. I have discussed the procedure, risks, benefits and alternatives in detail with patient and family. The risks including, but not limited to, the risks of bleeding, infection, pain, device malfunction, lead dislodgement, radiation exposure, injury to cardiac and surrounding structures (including pneumothorax), stroke, cardiac perforation, tamponade, need for emergent heart surgery, myocardial infarction and death were discussed in detail. The patient opted to proceed with the device implantation. Fatigue   - Likely multifactorial   - Doubt it is related to AF   - Will get TSH and can monitor AF burden, it has increased from 1% to 4% burden  HTN-goal <130/80   - Controlled   - Continue current medicaitons   - Encouraged patient to check BP at home, log and bring to office visits  - Discussed lifestyle modifications, weight loss, low sodium diet  CAD   -  of prox RCA, had viability scan in 2018   - Denies angina   - Continue medical therapy    - Follow up IC/Dr. Binh Lazo   Chronic systolic CHF   - EF 63%   - Appears compensated    - Continue GDMT   - Follows with HF  SOB/Severe COPD   - Not following with pulmonology  Plan  - Please call daughter for scheduling  - Need generator change, will make RMM aware and schedule for procedure accordingly  - No medication changes  - Call if symptoms develop  - Check your blood pressure at home, if your top number blood pressure is >140/90 or <90/50 please call the office  - Follow up with PCP  - Check Thyroid    F/U: Follow-up with EP in 6 months  -Follow up with device clinic as scheduled  -Call [facility] at [phone] with any questions    I have addressed the patient's cardiac risk factors and adjusted pharmacologic treatment as needed. In addition, I have reinforced the need for patient directed risk factor modification. I independently reviewed the device check interrogation and ECG    All questions and concerns were addressed with the patient. Alternatives to treatment were discussed. Thank you for allowing to us to participate in the care of Sheba Cardozo.     TIFFANIE Dasilva-CNP  [facility]   Office: [phone]

## 2020-10-22 NOTE — PATIENT INSTRUCTIONS
- No medication changes  - Call if symptoms develop  - Check your blood pressure at home, if your top number blood pressure is >140/90 or <90/50 please call the office  - Follow up with PCP  - Check Thyroid  Our  will be contacting you from [phone] to schedule your procedure.    - FOllow up 6 months

## 2020-10-22 NOTE — PROGRESS NOTES
Patient comes in for programming evaluation for her pacemaker. All sensing and pacing parameters are within normal range. Battery life 3 months until RRT  AP 78%.  8%. AT/AF with a 4% burden. 2 NSVT episodes noted. Last on 8/6/2020, longest 9 seconds with a Max V rate of 168 bpm.   Patient remains on Xarelto and metoprolol. No changes need to be made at this time. Please see interrogation for more detail. Patient will see Malina Liao today and follow up in 3 months in office or remotely.

## 2020-11-03 ENCOUNTER — CARE COORDINATION (OUTPATIENT)
Dept: CARE COORDINATION | Age: 80
End: 2020-11-03

## 2020-11-07 ENCOUNTER — HOSPITAL ENCOUNTER (INPATIENT)
Age: 80
LOS: 1 days | Discharge: HOME | DRG: 065 | End: 2020-11-08
Attending: EMERGENCY MEDICINE | Admitting: FAMILY MEDICINE
Payer: MEDICARE

## 2020-11-07 ENCOUNTER — APPOINTMENT (OUTPATIENT)
Dept: CT IMAGING | Age: 80
DRG: 065 | End: 2020-11-07
Payer: MEDICARE

## 2020-11-07 ENCOUNTER — APPOINTMENT (OUTPATIENT)
Dept: GENERAL RADIOLOGY | Age: 80
DRG: 065 | End: 2020-11-07
Payer: MEDICARE
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
## 2020-11-07 LAB
A/G RATIO: 1.7 (ref 1.1–2.2)
ALBUMIN SERPL-MCNC: 4.3 G/DL (ref 3.4–5)
ALP BLD-CCNC: 80 U/L (ref 40–129)
ALT SERPL-CCNC: 11 U/L (ref 10–40)
ANION GAP SERPL CALCULATED.3IONS-SCNC: 13 MMOL/L (ref 3–16)
AST SERPL-CCNC: 18 U/L (ref 15–37)
BACTERIA: ABNORMAL /HPF
BASOPHILS ABSOLUTE: 0.1 K/UL (ref 0–0.2)
BASOPHILS RELATIVE PERCENT: 1.1 %
BILIRUB SERPL-MCNC: 0.5 MG/DL (ref 0–1)
BILIRUBIN URINE: NEGATIVE
BLOOD, URINE: NEGATIVE
BUN BLDV-MCNC: 38 MG/DL (ref 7–20)
CALCIUM SERPL-MCNC: 9.7 MG/DL (ref 8.3–10.6)
CHLORIDE BLD-SCNC: 106 MMOL/L (ref 99–110)
CLARITY: CLEAR
CO2: 24 MMOL/L (ref 21–32)
COLOR: YELLOW
CREAT SERPL-MCNC: 1.5 MG/DL (ref 0.6–1.2)
EOSINOPHILS ABSOLUTE: 0.4 K/UL (ref 0–0.6)
EOSINOPHILS RELATIVE PERCENT: 3.7 %
EPITHELIAL CELLS, UA: 6 /HPF (ref 0–5)
GFR AFRICAN AMERICAN: 40
GFR NON-AFRICAN AMERICAN: 33
GLOBULIN: 2.6 G/DL
GLUCOSE BLD-MCNC: 93 MG/DL (ref 70–99)
GLUCOSE URINE: NEGATIVE MG/DL
HCT VFR BLD CALC: 44.7 % (ref 36–48)
HEMOGLOBIN: 14.2 G/DL (ref 12–16)
HYALINE CASTS: 1 /LPF (ref 0–8)
KETONES, URINE: NEGATIVE MG/DL
LEUKOCYTE ESTERASE, URINE: ABNORMAL
LYMPHOCYTES ABSOLUTE: 1.1 K/UL (ref 1–5.1)
LYMPHOCYTES RELATIVE PERCENT: 10.5 %
MCH RBC QN AUTO: 28.3 PG (ref 26–34)
MCHC RBC AUTO-ENTMCNC: 31.8 G/DL (ref 31–36)
MCV RBC AUTO: 89.1 FL (ref 80–100)
MICROSCOPIC EXAMINATION: YES
MONOCYTES ABSOLUTE: 0.5 K/UL (ref 0–1.3)
MONOCYTES RELATIVE PERCENT: 4.8 %
NEUTROPHILS ABSOLUTE: 8.2 K/UL (ref 1.7–7.7)
NEUTROPHILS RELATIVE PERCENT: 79.9 %
NITRITE, URINE: NEGATIVE
PDW BLD-RTO: 20.8 % (ref 12.4–15.4)
PH UA: 5.5 (ref 5–8)
PLATELET # BLD: 310 K/UL (ref 135–450)
PMV BLD AUTO: 8.5 FL (ref 5–10.5)
POTASSIUM REFLEX MAGNESIUM: 4.2 MMOL/L (ref 3.5–5.1)
PRO-BNP: 2170 PG/ML (ref 0–449)
PROTEIN UA: NEGATIVE MG/DL
RBC # BLD: 5.02 M/UL (ref 4–5.2)
RBC UA: ABNORMAL /HPF (ref 0–4)
SODIUM BLD-SCNC: 143 MMOL/L (ref 136–145)
SPECIFIC GRAVITY UA: 1.01 (ref 1–1.03)
TOTAL PROTEIN: 6.9 G/DL (ref 6.4–8.2)
TROPONIN: <0.01 NG/ML
URINE REFLEX TO CULTURE: ABNORMAL
URINE TYPE: ABNORMAL
UROBILINOGEN, URINE: 0.2 E.U./DL
WBC # BLD: 10.2 K/UL (ref 4–11)
WBC UA: 4 /HPF (ref 0–5)

## 2020-11-07 PROCEDURE — 83880 ASSAY OF NATRIURETIC PEPTIDE: CPT

## 2020-11-07 PROCEDURE — 72125 CT NECK SPINE W/O DYE: CPT

## 2020-11-07 PROCEDURE — 81001 URINALYSIS AUTO W/SCOPE: CPT

## 2020-11-07 PROCEDURE — 80053 COMPREHEN METABOLIC PANEL: CPT

## 2020-11-07 PROCEDURE — 85025 COMPLETE CBC W/AUTO DIFF WBC: CPT

## 2020-11-07 PROCEDURE — 99285 EMERGENCY DEPT VISIT HI MDM: CPT

## 2020-11-07 PROCEDURE — 2580000003 HC RX 258: Performed by: FAMILY MEDICINE

## 2020-11-07 PROCEDURE — 6370000000 HC RX 637 (ALT 250 FOR IP): Performed by: FAMILY MEDICINE

## 2020-11-07 PROCEDURE — 94760 N-INVAS EAR/PLS OXIMETRY 1: CPT

## 2020-11-07 PROCEDURE — 1200000000 HC SEMI PRIVATE

## 2020-11-07 PROCEDURE — 71045 X-RAY EXAM CHEST 1 VIEW: CPT

## 2020-11-07 PROCEDURE — 93005 ELECTROCARDIOGRAM TRACING: CPT | Performed by: PHYSICIAN ASSISTANT

## 2020-11-07 PROCEDURE — 84484 ASSAY OF TROPONIN QUANT: CPT

## 2020-11-07 PROCEDURE — 70450 CT HEAD/BRAIN W/O DYE: CPT

## 2020-11-07 RX ORDER — METOPROLOL SUCCINATE 25 MG/1
25 TABLET, EXTENDED RELEASE ORAL 2 TIMES DAILY
Status: DISCONTINUED | OUTPATIENT
Start: 2020-11-07 | End: 2020-11-08 | Stop reason: HOSPADM

## 2020-11-07 RX ORDER — CLOPIDOGREL BISULFATE 75 MG/1
75 TABLET ORAL DAILY
Status: DISCONTINUED | OUTPATIENT
Start: 2020-11-07 | End: 2020-11-08 | Stop reason: HOSPADM

## 2020-11-07 RX ORDER — ONDANSETRON 2 MG/ML
4 INJECTION INTRAMUSCULAR; INTRAVENOUS EVERY 6 HOURS PRN
Status: DISCONTINUED | OUTPATIENT
Start: 2020-11-07 | End: 2020-11-08 | Stop reason: HOSPADM

## 2020-11-07 RX ORDER — SODIUM CHLORIDE 0.9 % (FLUSH) 0.9 %
10 SYRINGE (ML) INJECTION PRN
Status: DISCONTINUED | OUTPATIENT
Start: 2020-11-07 | End: 2020-11-08 | Stop reason: HOSPADM

## 2020-11-07 RX ORDER — PROMETHAZINE HYDROCHLORIDE 25 MG/1
12.5 TABLET ORAL EVERY 6 HOURS PRN
Status: DISCONTINUED | OUTPATIENT
Start: 2020-11-07 | End: 2020-11-08 | Stop reason: HOSPADM

## 2020-11-07 RX ORDER — ACETAMINOPHEN 325 MG/1
650 TABLET ORAL EVERY 6 HOURS PRN
Status: DISCONTINUED | OUTPATIENT
Start: 2020-11-07 | End: 2020-11-08 | Stop reason: HOSPADM

## 2020-11-07 RX ORDER — TROSPIUM CHLORIDE 20 MG/1
20 TABLET, FILM COATED ORAL NIGHTLY
Status: DISCONTINUED | OUTPATIENT
Start: 2020-11-07 | End: 2020-11-08 | Stop reason: HOSPADM

## 2020-11-07 RX ORDER — TORSEMIDE 20 MG/1
10 TABLET ORAL DAILY
Status: DISCONTINUED | OUTPATIENT
Start: 2020-11-07 | End: 2020-11-08 | Stop reason: HOSPADM

## 2020-11-07 RX ORDER — MECLIZINE HCL 12.5 MG/1
12.5 TABLET ORAL 3 TIMES DAILY PRN
Status: DISCONTINUED | OUTPATIENT
Start: 2020-11-07 | End: 2020-11-08 | Stop reason: HOSPADM

## 2020-11-07 RX ORDER — TOLTERODINE 2 MG/1
2 CAPSULE, EXTENDED RELEASE ORAL DAILY
Status: DISCONTINUED | OUTPATIENT
Start: 2020-11-07 | End: 2020-11-07 | Stop reason: CLARIF

## 2020-11-07 RX ORDER — TOPIRAMATE 25 MG/1
50 TABLET ORAL 2 TIMES DAILY
Status: DISCONTINUED | OUTPATIENT
Start: 2020-11-07 | End: 2020-11-08 | Stop reason: HOSPADM

## 2020-11-07 RX ORDER — LEVOTHYROXINE SODIUM 112 UG/1
112 TABLET ORAL DAILY
Status: DISCONTINUED | OUTPATIENT
Start: 2020-11-07 | End: 2020-11-08 | Stop reason: HOSPADM

## 2020-11-07 RX ORDER — ACETAMINOPHEN 650 MG/1
650 SUPPOSITORY RECTAL EVERY 6 HOURS PRN
Status: DISCONTINUED | OUTPATIENT
Start: 2020-11-07 | End: 2020-11-08 | Stop reason: HOSPADM

## 2020-11-07 RX ORDER — SODIUM CHLORIDE 0.9 % (FLUSH) 0.9 %
10 SYRINGE (ML) INJECTION EVERY 12 HOURS SCHEDULED
Status: DISCONTINUED | OUTPATIENT
Start: 2020-11-07 | End: 2020-11-08 | Stop reason: HOSPADM

## 2020-11-07 RX ORDER — POLYETHYLENE GLYCOL 3350 17 G/17G
17 POWDER, FOR SOLUTION ORAL DAILY PRN
Status: DISCONTINUED | OUTPATIENT
Start: 2020-11-07 | End: 2020-11-08 | Stop reason: HOSPADM

## 2020-11-07 RX ADMIN — TROSPIUM CHLORIDE 20 MG: 20 TABLET, FILM COATED ORAL at 21:15

## 2020-11-07 RX ADMIN — Medication 10 ML: at 21:16

## 2020-11-07 RX ADMIN — ACETAMINOPHEN 650 MG: 325 TABLET ORAL at 21:24

## 2020-11-07 RX ADMIN — METOPROLOL SUCCINATE 25 MG: 25 TABLET, EXTENDED RELEASE ORAL at 21:15

## 2020-11-07 RX ADMIN — TOPIRAMATE 50 MG: 25 TABLET, FILM COATED ORAL at 21:15

## 2020-11-07 ASSESSMENT — PAIN SCALES - GENERAL
PAINLEVEL_OUTOF10: 1
PAINLEVEL_OUTOF10: 6
PAINLEVEL_OUTOF10: 0
PAINLEVEL_OUTOF10: 3

## 2020-11-07 ASSESSMENT — ENCOUNTER SYMPTOMS
NAUSEA: 0
RESPIRATORY NEGATIVE: 1
BACK PAIN: 0
CHEST TIGHTNESS: 0
ABDOMINAL PAIN: 0
SHORTNESS OF BREATH: 0
VOMITING: 0
COLOR CHANGE: 0
COUGH: 0
CONSTIPATION: 0
PHOTOPHOBIA: 0
DIARRHEA: 0

## 2020-11-07 ASSESSMENT — PAIN DESCRIPTION - LOCATION
LOCATION: HEAD
LOCATION: LEG

## 2020-11-07 ASSESSMENT — PAIN DESCRIPTION - ONSET: ONSET: ON-GOING

## 2020-11-07 ASSESSMENT — PAIN DESCRIPTION - FREQUENCY: FREQUENCY: INTERMITTENT

## 2020-11-07 ASSESSMENT — PAIN DESCRIPTION - DESCRIPTORS: DESCRIPTORS: BURNING;ACHING

## 2020-11-07 ASSESSMENT — PAIN DESCRIPTION - ORIENTATION: ORIENTATION: LEFT

## 2020-11-07 ASSESSMENT — PAIN DESCRIPTION - PAIN TYPE: TYPE: NEUROPATHIC PAIN

## 2020-11-07 NOTE — ED PROVIDER NOTES
[address]        Pt Name: Jose Benton  MRN: [id number]  [place] 1940  Date of evaluation: 11/7/2020  Provider: MAYNOR Daly  PCP: Ivonne López MD     I have seen and evaluated this patient with my supervising physician [id number] [facility]       Chief Complaint   Patient presents with    Fall     Pt reports falling last night, hit head on wood floor. Pt states \"the only reason I came is because im on two blood thinners and i want to be checked out\". No neuro deficits noted. Pt denies syncopal episode, but is unable to recall what made her fall \"I just lost balance\"       HISTORY OF PRESENT ILLNESS   (Location, Timing/Onset, Context/Setting, Quality, Duration, Modifying Factors, Severity, Associated Signs and Symptoms)  Note limiting factors. Jose Benton is a [de-identified] y.o. female with past medical history of atrial fibrillation anticoagulated on Xarelto, CAD, previous TIA, CHF, hypertension, chronic kidney disease, hyperlipidemia and thyroid disease who presents to the ED with complaint of a fall. Patient states he fell last night and hit her head. Patient states she hit the back of her head. Denies any loss of consciousness. Patient states she is on Xarelto and Plavix at home. Patient states he came to the ED for further evaluation and treatment. Patient states she cannot remember why she fell. Apparently has problems with her legs and problems with her balance chronically. States she believes she potentially just lost her balance but cannot say for certain that was the cause. Patient denies any lightheadedness or dizziness prior to falling. Denies headache, visual changes, speech disturbances, numbness/tingling, chest pain, shortness of breath, abdominal pain, nausea/vomiting, urinary symptoms or changes in bowel movements. Patient states she is an aching pain rated 3/10 to her posterior head and neck. Denies any other injury or trauma from the fall. Denies taking any over-the-counter medication for symptom troll. Became concerned and came to the ED for further evaluation and treatment. Nursing Notes were all reviewed and agreed with or any disagreements were addressed in the HPI. REVIEW OF SYSTEMS    (2-9 systems for level 4, 10 or more for level 5)     Review of Systems   Constitutional: Negative for activity change, appetite change, chills, diaphoresis, fatigue and fever. Eyes: Negative for photophobia and visual disturbance. Respiratory: Negative. Negative for cough, chest tightness and shortness of breath. Cardiovascular: Negative. Negative for chest pain, palpitations and leg swelling. Gastrointestinal: Negative for abdominal pain, constipation, diarrhea, nausea and vomiting. Genitourinary: Negative for decreased urine volume, difficulty urinating, dysuria, flank pain, frequency, hematuria and urgency. Musculoskeletal: Positive for arthralgias, myalgias and neck pain. Negative for back pain, gait problem, joint swelling and neck stiffness. Skin: Negative for color change, pallor, rash and wound. Neurological: Positive for headaches. Negative for dizziness, tremors, seizures, syncope, facial asymmetry, speech difficulty, weakness, light-headedness and numbness. Positives and Pertinent negatives as per HPI. Except as noted above in the ROS, all other systems were reviewed and negative.        PAST MEDICAL HISTORY     Past Medical History:   Diagnosis Date    Allergic rhinitis, cause unspecified     Arthritis     Atrial fibrillation ([address]     Bronchopneumonia     CAD (coronary artery disease)     stent:  post cataract surgery (CABG)    Cerebral artery occlusion with cerebral infarction ([address]     TIA    Chronic gouty arthropathy     Chronic kidney disease     Congestive heart failure, unspecified     Degeneration of cervical intervertebral disc     Essential and other tolterodine (DETROL LA) 2 MG extended release capsule Take 1 capsule by mouth daily  Qty: 30 capsule, Refills: 0      traZODone (DESYREL) 50 MG tablet Take 1 tablet by mouth nightly  Qty: 30 tablet, Refills: 5      levothyroxine (SYNTHROID) 112 MCG tablet TAKE 1 TABLET EVERY DAY  Qty: 90 tablet, Refills: 1      vitamin D (ERGOCALCIFEROL) 1.25 MG (85019 UT) CAPS capsule Take 1 capsule by mouth once a week  Qty: 12 capsule, Refills: 3      meclizine (ANTIVERT) 12.5 MG tablet Take 1 tablet by mouth 3 times daily as needed for Dizziness or Nausea  Qty: 90 tablet, Refills: 0      metoprolol succinate (TOPROL XL) 100 MG extended release tablet Take 1 tablet by mouth 2 times daily  Qty: 180 tablet, Refills: 1      XARELTO 15 MG TABS tablet TAKE 1 TABLET DAILY WITH BREAKFAST  Qty: 90 tablet, Refills: 3      !! allopurinol (ZYLOPRIM) 100 MG tablet Take 1 tablet by mouth daily  Qty: 90 tablet, Refills: 1    Comments: Pt takes with the 300mg tabs      topiramate (TOPAMAX) 50 MG tablet Take 1 tablet by mouth 2 times daily  Qty: 60 tablet, Refills: 3       !! - Potential duplicate medications found. Please discuss with provider. ALLERGIES     Aspirin; Ativan [lorazepam]; Diltiazem; Sulfa antibiotics; Dabigatran; Lipitor [atorvastatin]; Mysoline [primidone];  Nsaids; and Other    FAMILYHISTORY       Family History   Problem Relation Age of Onset    Cancer Sister     Heart Disease Sister     Diabetes Brother     Hypertension Brother     Heart Disease Brother     Stroke Maternal Aunt     Heart Disease Maternal Aunt     Diabetes Maternal Uncle     Hypertension Paternal Aunt     Cancer Maternal Grandmother     Cancer Paternal Grandmother     Rheum Arthritis Neg Hx     Lupus Neg Hx           SOCIAL HISTORY       Social History     Tobacco Use    Smoking status: Former Smoker     Packs/day: 1.00     Years: 28.00     Pack years: 28.00     Types: Cigarettes     Last attempt to quit: 1/1/1987     Years since quittin.8    Smokeless tobacco: Never Used    Tobacco comment: H.O.smoking at age 15 / smoked up to 1 p.p.d / quit    Substance Use Topics    Alcohol use: Not Currently     Alcohol/week: 0.0 standard drinks    Drug use: No       SCREENINGS    Ronit Coma Scale  Eye Opening: Spontaneous  Best Verbal Response: Oriented  Best Motor Response: Obeys commands  [place] Coma Scale Score: 15        PHYSICAL EXAM    (up to 7 for level 4, 8 or more for level 5)     ED Triage Vitals [20 0946]   BP Temp Temp Source Pulse Resp SpO2 Height Weight   (!) 142/77 97.7 °F (36.5 °C) Oral 70 16 100 % -- 130 lb (59 kg)       Physical Exam  Constitutional:       General: She is not in acute distress. Appearance: Normal appearance. She is well-developed. She is not ill-appearing, toxic-appearing or diaphoretic. HENT:      Head: Normocephalic and atraumatic. Comments: Atraumatic. No raccoon eyes or plummer sign. No epistaxis. No septal hematoma. No trismus or jaw malocclusion. Right Ear: External ear normal.      Left Ear: External ear normal.      Mouth/Throat:      Mouth: Mucous membranes are moist.      Pharynx: No oropharyngeal exudate or posterior oropharyngeal erythema. Eyes:      General:         Right eye: No discharge. Left eye: No discharge. Extraocular Movements: Extraocular movements intact. Conjunctiva/sclera: Conjunctivae normal.      Pupils: Pupils are equal, round, and reactive to light. Neck:      Musculoskeletal: Normal range of motion and neck supple. No neck rigidity or muscular tenderness. Cardiovascular:      Rate and Rhythm: Normal rate. Rhythm irregular. Pulses: Normal pulses. Heart sounds: Normal heart sounds. No murmur. No friction rub. No gallop. Comments: 2+ radial pulses bilaterally. No pedal edema. No calf tenderness. No JVD. Pulmonary:      Effort: Pulmonary effort is normal. No respiratory distress.       Breath sounds: Normal breath sounds. No stridor. No wheezing, rhonchi or rales. Chest:      Chest wall: No tenderness. Abdominal:      General: Abdomen is flat. There is no distension. Palpations: Abdomen is soft. There is no mass. Tenderness: There is no abdominal tenderness. There is no right CVA tenderness, left CVA tenderness, guarding or rebound. Hernia: No hernia is present. Musculoskeletal: Normal range of motion. Comments: Does have some tenderness palpation of midline cervical spine associated cervical paraspinal musculature bilaterally. No TTP to the midline thoracic or lumbar spine. No crepitus or step-off. No anterior chest wall tenderness. No pelvis instability. No TTP to the upper and lower extremities throughout. Full range of motion strength throughout. Distal neurovascular intact. Gait deferred. Lymphadenopathy:      Cervical: No cervical adenopathy. Skin:     General: Skin is warm and dry. Coloration: Skin is not pale. Findings: No erythema or rash. Neurological:      General: No focal deficit present. Mental Status: She is alert and oriented to person, place, and time. GCS: GCS eye subscore is 4. GCS verbal subscore is 5. GCS motor subscore is 6. Cranial Nerves: Cranial nerves are intact. No cranial nerve deficit. Sensory: No sensory deficit. Motor: Motor function is intact. Comments: Gait deferred at this time.    Psychiatric:         Behavior: Behavior normal.         DIAGNOSTIC RESULTS   LABS:    Labs Reviewed   BRAIN NATRIURETIC PEPTIDE - Abnormal; Notable for the following components:       Result Value    Pro-BNP 2,170 (*)     All other components within normal limits    Narrative:     Performed at:  [facility] [address], [place], [facility][note 3] [address]   Phone [phone]   CBC WITH AUTO DIFFERENTIAL - Abnormal; Notable for the following components:    RDW 20.8 (*)     Neutrophils Absolute 8.2 (*) All other components within normal limits    Narrative:     Performed at:  [facility] [address] [place]   Phone [phone]   COMPREHENSIVE METABOLIC PANEL W/ REFLEX TO MG FOR LOW K - Abnormal; Notable for the following components:    BUN 38 (*)     CREATININE 1.5 (*)     GFR Non- 33 (*)     GFR  40 (*)     All other components within normal limits    Narrative:     Performed at:  [facility] [address] [place]   Phone [phone]   URINE RT REFLEX TO CULTURE - Abnormal; Notable for the following components:    Leukocyte Esterase, Urine TRACE (*)     All other components within normal limits    Narrative:     Performed at:  [facility] [address]   Phone [phone]   MICROSCOPIC URINALYSIS - Abnormal; Notable for the following components:    Bacteria, UA RARE (*)     Epithelial Cells, UA 6 (*)     All other components within normal limits    Narrative:     Performed at:  [facility] [address]   Phone [phone]   TROPONIN    Narrative:     Performed at:  [facility] [address]   Phone [phone]       All other labs were within normal range or not returned as of this dictation. EKG: All EKG's are interpreted by the Emergency Department Physician in the absence of a cardiologist.  Please see their note for interpretation of EKG.       RADIOLOGY:   Non-plain film images such as CT, Ultrasound and MRI are read by the radiologist. Plain radiographic images are visualized and preliminarily interpreted by the ED Provider with the below findings:        Interpretation per the Radiologist below, if available at the time of this note:    XR CHEST PORTABLE   Final Result   No acute cardiopulmonary disease. Stable cardiomegaly with central vascular   congestion with no overt failure. CT CERVICAL SPINE WO CONTRAST   Final Result   No acute intracranial abnormality. Mild cerebral atrophy appropriate for   age. Moderate chronic ischemic changes. Small old upper right frontal   parietal cortical and subcortical infarct which has developed since the prior   study. No acute abnormality of the cervical spine. Severe cervical spondylosis and   facet arthropathy. CT HEAD WO CONTRAST   Final Result   No acute intracranial abnormality. Mild cerebral atrophy appropriate for   age. Moderate chronic ischemic changes. Small old upper right frontal   parietal cortical and subcortical infarct which has developed since the prior   study. No acute abnormality of the cervical spine. Severe cervical spondylosis and   facet arthropathy. MRI BRAIN WO CONTRAST    (Results Pending)     No results found.         PROCEDURES   Unless otherwise noted below, none     Procedures    CRITICAL CARE TIME   N/A    CONSULTS:  None      EMERGENCY DEPARTMENT COURSE and DIFFERENTIAL DIAGNOSIS/MDM:   Vitals:    Vitals:    11/07/20 1500 11/07/20 1515 11/07/20 1530 11/07/20 1715   BP: (!) 139/48  (!) 134/55    Pulse: 71 75 70    Resp: 24 27 24    Temp:       TempSrc:       SpO2:       Weight:    130 lb (59 kg)   Height:    5' 3\" (1.6 m)       Patient was given the following medications:  Medications   clopidogrel (PLAVIX) tablet 75 mg (has no administration in time range)   levothyroxine (SYNTHROID) tablet 112 mcg (has no administration in time range)   meclizine (ANTIVERT) tablet 12.5 mg (has no administration in time range)   metoprolol succinate (TOPROL XL) extended release tablet 25 mg (has no administration in time range)   topiramate (TOPAMAX) tablet 50 mg (has no administration in time range)   torsemide (DEMADEX) tablet 10 mg (has no administration in time range)   rivaroxaban (XARELTO) tablet 15 mg (has no administration in time range)   sodium chloride flush 0.9 % injection 10 mL (has no administration in time range)   sodium chloride flush 0.9 % injection 10 mL (has no administration in time range)   acetaminophen (TYLENOL) tablet 650 mg (has no administration in time range)     Or   acetaminophen (TYLENOL) suppository 650 mg (has no administration in time range)   polyethylene glycol (GLYCOLAX) packet 17 g (has no administration in time range)   promethazine (PHENERGAN) tablet 12.5 mg (has no administration in time range)     Or   ondansetron (ZOFRAN) injection 4 mg (has no administration in time range)   trospium (SANCTURA) tablet 20 mg (has no administration in time range)           Patient is an 66-year-old female who presents to the implant of a fall. Had fall yesterday. Patient that she believes she lost her balance. Patient is poor historian and cannot entirely confirm that she tripped and lost her balance. Patient does have history of A. fib anticoagulated on Xarelto and also CHF. Patient states she believes her loss of balance but again unsure of mechanism at this time. Given the fact the patient had fall with closed head injury and unclear mechanism IV established and blood work obtained. Troponin normal.  EKG interpreted by attending. BNP 2100. CBC showed normal white count, hemoglobin and platelets. CMP showed creatinine of 1.5. Otherwise unremarkable. Urinalysis unremarkable. CT of the head and cervical spine obtained and showed no acute intracranial abnormality/abnormality to the cervical spine. CT of the head did show small old upper right frontal parietal cortical and subcortical infarct which is developed since previous study. Previous study back in February of this year. Chest x-ray showed no acute abnormality.   Given the fact the patient has abnormal head CT concerning for stroke since previous scan back in February with fall with unsure of mechanical nature believe would benefit from admission for observation for further evaluation and treatment. Case discussed with hospitalist who graciously agreed accept patient for admission at this time. Reassuring neuro examination here in the ED. FINAL IMPRESSION      1. Fall, initial encounter    2. Closed head injury, initial encounter    3. Abnormal CT of the head          DISPOSITION/PLAN   DISPOSITION Admitted 11/07/2020 03:26:49 PM      PATIENT REFERREDTO:  No follow-up provider specified.     DISCHARGE MEDICATIONS:  Current Discharge Medication List          DISCONTINUED MEDICATIONS:  Current Discharge Medication List                 (Please note that portions of this note were completed with a voice recognition program.  Efforts were made to edit the dictations but occasionally words are mis-transcribed.)    MAYNOR Vivas (electronically signed)          MAYNOR Ellington  11/07/20 9876

## 2020-11-07 NOTE — ED NOTES
Bed: 20  Expected date:   Expected time:   Means of arrival: Walk In  Comments:     Nano Kennedy, RN  11/07/20 4277

## 2020-11-07 NOTE — ED PROVIDER NOTES
I personally evaluated and examined the patient in conjunction with the SAUD and agree with the assessment, treatment plan and disposition of the patient as recorded by the SAUD. I reviewed pertinent nursing notes, triage notes, vital signs, past medical history, family and social history, medications, and allergies. Complete review of systems was conducted by the SAUD and/or myself. Review of systems is negative except as documented in the history of present illness. Brief HPI: 80-year-old female presents the emergency department with chief complaint of a fall. Occurred last night. She reports that she had no presyncopal symptoms or vertigo. No new vision changes, facial droop, slurred speech, numbness or weakness of the extremities. Denies any injuries. Note that she usually walks with a walker. Reports that the phone rang and she got up to answer it and then fell. Denies tripping. No presyncopal symptoms. No vertigo. Physical Exam: General: Patient is in no acute distress   Head: Normocephalic, atraumatic, pupils are equal and reactive to light. EOMI. Neck: Neck is supple. No JVD noted. Cardiovascular: Capillary refill less than 2 seconds  Lungs: No respiratory distress  Abdomen: soft, non-tender, non-distended   Extremities: no lower extremity edema. Capillary refill is less than 2 seconds   Skin: no cyanosis or pallor; no rashes noted   Neuro: CN's 2-12 are grossly intact. No focal neurologic deficit appreciated. Slow shuffled gait which the daughter reports is normal for her.     Patient's NIH Stroke Scale upon arrival is:    Level of Consciousness:  0 - alert; keenly responsive    LOC Questions:  0 - answers both questions correctly    LOC Commands:  0 - performs both tasks correctly    Best Gaze:  0 - normal    Visual Fields:  0 - no visual loss    Facial Palsy:  0 - normal symmetric movement    Motor-Arm-Left:  0 - no drift, limb holds 90 (or 45) degrees for full 10 seconds    Motor-Leg-Left:  0 - no drift; leg holds 30 degree position for full 5 seconds    Motor-Arm-Right:  0 - no drift, limb holds 90 (or 45) degrees for full 10 seconds    Motor-Leg-Right:  0 - no drift; leg holds 30 degree position for full 5 seconds    Limb Ataxia:  0 - absent    Sensory:  0 - normal; no sensory loss    Best Language:  0 - no aphasia, normal    Dysarthria:  0 - normal    Extinction and Inattention:  0 - no abnormality    NIHSS = 0    Given the abnormal CT head with possible CVA and she does not recall the cause of her fall-recommend observation. EKG: EKG interpretation by ED physician: Normal axis, atrial paced rhythm at a rate of 70 bpm. No ischemic ST changes. FINAL IMPRESSION     1. Fall, initial encounter    2. Closed head injury, initial encounter    3. Abnormal CT of the head            Electronically signed by:   Aliyah Funez Sa, DO  11/07/20 4323

## 2020-11-08 VITALS
RESPIRATION RATE: 16 BRPM | SYSTOLIC BLOOD PRESSURE: 118 MMHG | TEMPERATURE: 97.4 F | WEIGHT: 126.1 LBS | HEART RATE: 70 BPM | BODY MASS INDEX: 22.34 KG/M2 | HEIGHT: 63 IN | DIASTOLIC BLOOD PRESSURE: 70 MMHG | OXYGEN SATURATION: 96 %

## 2020-11-08 PROBLEM — R93.0 ABNORMAL CT OF THE HEAD: Status: ACTIVE | Noted: 2020-11-08

## 2020-11-08 LAB
EKG ATRIAL RATE: 70 BPM
EKG DIAGNOSIS: NORMAL
EKG P AXIS: -27 DEGREES
EKG P-R INTERVAL: 264 MS
EKG Q-T INTERVAL: 432 MS
EKG QRS DURATION: 102 MS
EKG QTC CALCULATION (BAZETT): 466 MS
EKG R AXIS: -21 DEGREES
EKG T AXIS: 54 DEGREES
EKG VENTRICULAR RATE: 70 BPM

## 2020-11-08 PROCEDURE — 6370000000 HC RX 637 (ALT 250 FOR IP): Performed by: FAMILY MEDICINE

## 2020-11-08 PROCEDURE — 97116 GAIT TRAINING THERAPY: CPT

## 2020-11-08 PROCEDURE — 99223 1ST HOSP IP/OBS HIGH 75: CPT | Performed by: PSYCHIATRY & NEUROLOGY

## 2020-11-08 PROCEDURE — 97530 THERAPEUTIC ACTIVITIES: CPT

## 2020-11-08 PROCEDURE — 2580000003 HC RX 258: Performed by: FAMILY MEDICINE

## 2020-11-08 PROCEDURE — 93010 ELECTROCARDIOGRAM REPORT: CPT | Performed by: INTERNAL MEDICINE

## 2020-11-08 PROCEDURE — 97165 OT EVAL LOW COMPLEX 30 MIN: CPT

## 2020-11-08 PROCEDURE — 97162 PT EVAL MOD COMPLEX 30 MIN: CPT

## 2020-11-08 PROCEDURE — 97535 SELF CARE MNGMENT TRAINING: CPT

## 2020-11-08 RX ORDER — TOPIRAMATE 25 MG/1
TABLET ORAL
Status: DISCONTINUED
Start: 2020-11-08 | End: 2020-11-08 | Stop reason: HOSPADM

## 2020-11-08 RX ORDER — METOPROLOL SUCCINATE 25 MG/1
25 TABLET, EXTENDED RELEASE ORAL 2 TIMES DAILY
Qty: 60 TABLET | Refills: 1 | Status: SHIPPED | OUTPATIENT
Start: 2020-11-08 | End: 2021-01-04

## 2020-11-08 RX ADMIN — TORSEMIDE 10 MG: 20 TABLET ORAL at 07:45

## 2020-11-08 RX ADMIN — LEVOTHYROXINE SODIUM 112 MCG: 0.11 TABLET ORAL at 06:31

## 2020-11-08 RX ADMIN — TOPIRAMATE 50 MG: 25 TABLET, FILM COATED ORAL at 07:44

## 2020-11-08 RX ADMIN — CLOPIDOGREL BISULFATE 75 MG: 75 TABLET ORAL at 07:45

## 2020-11-08 RX ADMIN — METOPROLOL SUCCINATE 25 MG: 25 TABLET, EXTENDED RELEASE ORAL at 07:45

## 2020-11-08 ASSESSMENT — PAIN SCALES - GENERAL: PAINLEVEL_OUTOF10: 0

## 2020-11-08 NOTE — PLAN OF CARE
Neurology consulted: CVA on CT scan which is new compared to prior exam. No plans for repeat CT at this time. Patient feels she is back to baseline other than mild general weakness. Plan to monitor H&H, kidney function (stage 3 renal failure), will progress w/ PT/OT. Patient & daughter requested d/c, ok'd after neurology rounded. Reviewed s/s stroke (& risk factors), f/u appointments, & current medications.  Discussed general health maintenance & fall prevention      Problem: Falls - Risk of:  Goal: Will remain free from falls  Description: Will remain free from falls  Outcome: Completed  Goal: Absence of physical injury  Description: Absence of physical injury  Outcome: Completed     Problem: Falls - Risk of:  Goal: Absence of physical injury  Description: Absence of physical injury  Outcome: Completed    Problem: Mobility - Impaired:  Goal: Mobility will improve  Description: Mobility will improve  Outcome: Completed

## 2020-11-08 NOTE — DISCHARGE SUMMARY
rehab. Patient presented to hospital after fall at home and hit her head. She is unsure how she fell, says she just fell backwards. CT head negative for acute bleed but did show old frontal CVA which is new from previous CT in 2/2020. CT c-spine negative for fracture. Prior carotid doppler 2/2020 with < 50% stenosis bilaterally. Patient reports she feels fine and is eager for DC home. Neurology was consulted in view of abnormal CT head. Suspected this is most likely embolic stroke from her chronic atrial fibrillation. MRI of brain unable to be obtained because of her pacemaker. Recommend continuation of Xarelto and repeat CT head in 2-3 months to ensure stability. Patient was evaluated by PT/OT and home health care recommended to address ADL and functional mobility deficits but this was declined. Consults. IP CONSULT TO NEUROLOGY    Physical examination on discharge day. BP (!) 145/84   Pulse 70   Temp 97.8 °F (36.6 °C) (Oral)   Resp 18   Ht 5' 3\" (1.6 m)   Wt 126 lb 1.6 oz (57.2 kg)   SpO2 99%   BMI 22.34 kg/m²   General appearance. Alert. Looks comfortable. HEENT. Sclera clear. Moist mucus membranes. Cardiovascular. Regular rate and rhythm, normal S1, S2. No murmur. Respiratory. Not using accessory muscles. Clear to auscultation bilaterally, no wheeze. Gastrointestinal. Abdomen soft, non-tender, not distended, normal bowel sounds  Neurology. Facial symmetry. No speech deficits. Moving all extremities equally. Extremities. No edema in lower extremities. Skin. Warm, dry, normal turgor    Condition at time of discharge stable    Medication instructions provided to patient at discharge. Medication List      CHANGE how you take these medications    allopurinol 100 MG tablet  Commonly known as:  ZYLOPRIM  Take 1 tablet by mouth daily  What changed:  Another medication with the same name was removed. Continue taking this medication, and follow the directions you see here.      metoprolol succinate 25 MG extended release tablet  Commonly known as:  TOPROL XL  Take 1 tablet by mouth 2 times daily  What changed:    · medication strength  · how much to take        CONTINUE taking these medications    clopidogrel 75 MG tablet  Commonly known as:  PLAVIX  Take 1 tablet by mouth daily     levothyroxine 112 MCG tablet  Commonly known as:  SYNTHROID  TAKE 1 TABLET EVERY DAY     topiramate 50 MG tablet  Commonly known as:  TOPAMAX  Take 1 tablet by mouth 2 times daily     torsemide 10 MG tablet  Commonly known as:  DEMADEX  TAKE 1 TABLET EVERY OTHER DAY ALTERNATING WITH  2  TABLETS EVERY OTHER DAY     Xarelto 15 MG Tabs tablet  Generic drug:  rivaroxaban  TAKE 1 TABLET DAILY WITH BREAKFAST        STOP taking these medications    traZODone 50 MG tablet  Commonly known as:  DESYREL     vitamin D 1.25 MG (56456 UT) Caps capsule  Commonly known as:  ERGOCALCIFEROL           Where to Get Your Medications      These medications were sent to [address] [place] [address] [phone] Beni Marcos [phone] [facility], [address] [place] [address]    Phone:  [phone]   · metoprolol succinate 25 MG extended release tablet         Discharge recommendations given to patient. Follow Up. PCP in 1 week   Disposition. Home  Activity. As tolerated  Diet: DIET GENERAL;      Spent 40 minutes in discharge process.     Signed:  TIFFANIE Brar CNP     11/8/2020 12:41 PM

## 2020-11-08 NOTE — PROGRESS NOTES
Patient admitted from the ER, VSS, alert oriented, ambulated to bathroom, plan of care reviewed. Patient cannot have an MRI, she has a pacemaker. Called MD, MRI cancelled. Plan of care reviewed with patient and daughter. Call light within reach.

## 2020-11-08 NOTE — PROGRESS NOTES
Occupational Therapy   Occupational Therapy Initial Assessment  Date: 2020   Patient Name: Berta Martini  MRN: [id number]     : 1940    Date of Service: 2020    Discharge Recommendations: Berta Martini scored a 23/24 on the AM-PAC ADL Inpatient form. Current research shows that an AM-PAC score of 18 or greater is typically associated with a discharge to the patient's home setting. Based on the patient's AM-PAC score, and their current ADL deficits, it is recommended that the patient have 2-3 sessions per week of Occupational Therapy at d/c to increase the patient's independence. At this time, this patient demonstrates the endurance and safety to discharge home with home OT and a follow up treatment frequency of 2-3x/wk. Please see assessment section for further patient specific details. If patient discharges prior to next session this note will serve as a discharge summary. Please see below for the latest assessment towards goals. S Level 1  HOME HEALTH CARE: LEVEL 1 STANDARD    - Initial home health evaluation to occur within 24-48 hours, in patient home   - Therapy to evaluate with goal of regaining prior level of functioning   - Therapy to evaluate if patient has [id number] needs for personal care  OT Equipment Recommendations  Equipment Needed: Yes  Other: Recommend a LIFE ALERT button that measures if patient has a fall. Patient stated had 1 for her  but was expensive. Assessment   Performance deficits / Impairments: Decreased functional mobility ; Decreased ADL status  Assessment: Patient is very mildly declined in ADLs and mobility after having fall at home. Patient lives alone but daughter visits often and lives 5 minutes away.   Treatment Diagnosis: Debility and decreased ADLs due to fall at home  Prognosis: Good  Decision Making: Low Complexity  History: LIves alone in ranch home, h/o falls over last year due to dizziness, uses RW, dtr lives nearby  Exam: Setup for ADLs, indep toileting, indep bed mobility, CGA with RW for ambulation in hallway and room (tends to keep feet close together)  Assistance / Modification: RW  OT Education: OT Role;Plan of Care;ADL Adaptive Strategies;Transfer Training  Patient Education: Patient verbalized understanding of POC, ADLs and mobility  REQUIRES OT FOLLOW UP: Yes  Activity Tolerance  Activity Tolerance: Patient Tolerated treatment well  Activity Tolerance: Patient did complain of mildly lightheadedness-- /58, HR 70, 94% oxygen. Safety Devices  Safety Devices in place: Yes  Type of devices: All fall risk precautions in place;Nurse notified;Call light within reach; Chair alarm in place; Left in chair  Restraints  Initially in place: No           Patient Diagnosis(es): The primary encounter diagnosis was Fall, initial encounter. Diagnoses of Closed head injury, initial encounter and Abnormal CT of the head were also pertinent to this visit. has a past medical history of Allergic rhinitis, cause unspecified, Arthritis, Atrial fibrillation ([address] Bronchopneumonia, CAD (coronary artery disease), Cerebral artery occlusion with cerebral infarction ([address] Chronic gouty arthropathy, Chronic kidney disease, Congestive heart failure, unspecified, Degeneration of cervical intervertebral disc, Essential and other specified forms of tremor, Gout, HIGH CHOLESTEROL, History of CVA (cerebrovascular accident), Hx of blood clots, Hypertension, Influenza, Mitral valve stenosis and aortic valve insufficiency, Movement disorder, Pacemaker, Peptic ulcer, unspecified site, unspecified as acute or chronic, without mention of hemorrhage, perforation, or obstruction, Thyroid disease, Unspecified disorder of kidney and ureter, and Unspecified hypothyroidism. has a past surgical history that includes back surgery;  Cholecystectomy; Cardiac surgery; Coronary artery bypass graft (1987); shoulder surgery; Cardiac catheterization (7/2012); hip surgery (Left, 03/16/2017); joint replacement; Cardiac catheterization (08/07/2018); Percutaneous Transluminal Coronary Angio (11/04/2014); pr inject anes/steroid foramen lumbar/sacral w img guide ,1 level (Right, 12/3/2018); Femoral-femoral Bypass Graft (N/A, 8/22/2019); and femoral bypass (Left, 8/22/2019). Treatment Diagnosis: Debility and decreased ADLs due to fall at home      Restrictions  Restrictions/Precautions  Required Braces or Orthoses?: No  Position Activity Restriction  Other position/activity restrictions: Lucho Farr is a [de-identified] y.o. female with past medical history of atrial fibrillation anticoagulated on Xarelto, CAD, previous TIA, CHF, hypertension, chronic kidney disease, hyperlipidemia and thyroid disease who presents to the ED with complaint of a fall. Patient states he fell last night and hit her head. Patient states she hit the back of her head. Denies any loss of consciousness. Patient states she is on Xarelto and Plavix at home. Patient states he came to the ED for further evaluation and treatment. Patient states she cannot remember why she fell. Apparently has problems with her legs and problems with her balance chronically. States she believes she potentially just lost her balance but cannot say for certain that was the cause. Patient denies any lightheadedness or dizziness prior to falling. Denies headache, visual changes, speech disturbances, numbness/tingling, chest pain, shortness of breath, abdominal pain, nausea/vomiting, urinary symptoms or changes in bowel movements. Patient states she is an aching pain rated 3/10 to her posterior head and neck. Denies any other injury or trauma from the fall. Denies taking any over-the-counter medication for symptom troll. Became concerned and came to the ED for further evaluation and treatment. Subjective   General  Chart Reviewed:  Yes  Additional Pertinent Hx: Small old upper right frontal parietal cortical and subcortical infarct which has developed since the prior study-- Head CT. Unable to get MRI due to a pacemaker  Family / Caregiver Present: No  Diagnosis: Fall at home  Subjective  Subjective: Patient supine in bed, pleasant and cooperative. General Comment  Comments: Patient stated she has chronic dizziness over last year, always uses a RW. However stated this fall was very different-- fell straight back and was not dizzy at the time. Patient Currently in Pain: Other (comment)(Reported is generally sore over legs and arms from the fall)  Vital Signs  Patient Currently in Pain: Other (comment)(Reported is generally sore over legs and arms from the fall)  Social/Functional History  Social/Functional History  Lives With: Alone  Type of Home: House(ranch)  Home Layout: Able to Live on Main level with bedroom/bathroom, One level  Home Access: Stairs to enter without rails  Entrance Stairs - Number of Steps: 2  Bathroom Shower/Tub: Tub/Shower unit  Bathroom Toilet: Handicap height  Bathroom Equipment: Shower chair  Bathroom Accessibility: Walker accessible  Home Equipment: Rolling walker, Cane(uses RW at baseline)  Receives Help From: Family(daughter)  ADL Assistance: Independent  Homemaking Assistance: Needs assistance(Daughter helps with cleaning, appointments)  Homemaking Responsibilities: Yes  Ambulation Assistance: Needs assistance(Uses RW at baseline due to dizziness)  Transfer Assistance: Independent  Active : Yes(Rarely drive)  Occupation: Retired  Type of occupation: [employer]  Leisure & Hobbies: play with small dog  Additional Comments: Pt reports dizziness the last year, doctors and pt unsure of what is causing this. Bending is one factor that causes this. Pt feels \"off balance\" when dizziness occurs. Pt reports no other falls in past 6 months except for one that caused hospitalization. Reports not dizzy with this fall but fell backwards, \"it was different\".        Objective        Orientation  Overall Orientation Status: Within Normal Limits     Balance  Sitting Balance: Independent  Standing Balance: Supervision  Standing Balance  Time: 10 minutes at sink, and then ambulation @ 150 feet with RW  Activity: Indep with grooming tasks at sink. However needed supervision with RW due to tends to keep feet close together. Comment: See PT evaluation for further details. Toilet Transfers  Toilet - Technique: Ambulating  Equipment Used: Standard toilet  Toilet Transfer: Independent  ADL  Feeding: Independent  Grooming: Modified independent   LE Dressing: Setup  Toileting: Independent  Additional Comments: Patient indep with bed mobility. Indep donning and doffing socks. Indep toileting. Patient stood x 10 minutes at sink to brush dentures and comb hair. Patient did state felt a little lightheaded-- /58, HR 70, 94% once sitting in chair. Tone RUE  RUE Tone: Normotonic  Tone LUE  LUE Tone: Normotonic  Coordination  Movements Are Fluid And Coordinated: Yes     Bed mobility  Supine to Sit: Independent  Scooting: Independent     Vision - Basic Assessment  Prior Vision: Wears glasses only for reading  Visual History: No significant visual history  Cognition  Overall Cognitive Status: WFL  Perception  Overall Perceptual Status: WFL     Sensation  Overall Sensation Status: (Reports has neuropathy from thighs down to feet.  WNL in hands)        LUE AROM (degrees)  LUE AROM : WNL  Left Hand AROM (degrees)  Left Hand AROM: WNL  RUE AROM (degrees)  RUE AROM : WNL  Right Hand AROM (degrees)  Right Hand AROM: WNL  LUE Strength  Gross LUE Strength: WFL  RUE Strength  Gross RUE Strength: WFL                   Plan   Plan  Times per week: 3-5  Current Treatment Recommendations: Functional Mobility Training, Safety Education & Training, Self-Care / ADL, Patient/Caregiver Education & Training    G-Code     OutComes Score                                                  AM-PAC Score        AM-PAC Inpatient Daily Activity Raw Score: 23 (11/08/20 5982)  AM-PAC Inpatient ADL T-Scale Score : 51.12 (11/08/20 0954)  ADL Inpatient CMS 0-100% Score: 15.86 (11/08/20 0954)  ADL Inpatient CMS G-Code Modifier : CI (11/08/20 0954)    Goals  Short term goals  Time Frame for Short term goals: Until discharge  Short term goal 1: Indep functional mobility with RW  Short term goal 2: Indep functional transfers with RW  Short term goal 3:  Indep item retrieval from Bernette Sears level  Short term goal 4: Setup bathing  Long term goals  Time Frame for Long term goals : STGs= LTGs  Patient Goals   Patient goals : Go home soon       Therapy Time   Individual Concurrent Group Co-treatment   Time In 0849         Time Out 0935         Minutes 46              Timed Code Treatment Minutes:  31      Total Treatment Minutes:  [address], OTR/L [address]

## 2020-11-08 NOTE — PROGRESS NOTES
Physical Therapy    Facility/Department: [address] ORTHO/NEURO NURSING  Initial Assessment    NAME: Bubba Patel  : 1940  MRN: [id number]    Date of Service: 2020    Discharge Recommendations:  Bubba Patel scored a 20/24 on the AM-PAC short mobility form. Current research shows that an AM-PAC score of 18 or greater is typically associated with a discharge to the patient's home setting. Based on the patient's AM-PAC score and their current functional mobility deficits, it is recommended that the patient have 2-3 sessions per week of Physical Therapy at d/c to increase the patient's independence. At this time, this patient demonstrates the endurance and safety to discharge home with home services and a follow up treatment frequency of 2-3x/wk. Please see assessment section for further patient specific details. HOME HEALTH CARE: LEVEL 3 SAFETY     - Initial home health evaluation to occur within 24-48 hours, in patient home   - Therapy evaluations in home within 24-48 hours of discharge; including DME and home safety   - Frontload therapy 5 days, then 3x a week   - Therapy to evaluate if patient has [address] needs for personal care   -  evaluation within 24-48 hours, includes evaluation of resources and insurance to determine [place], [place], LTC, and Medicaid options     Pt would benefit from frontload therapy to address safety/balance concerns within her home environment and address other deficits to return to modified independent level of living. Ideally patient would have 24 hour supervision initially upon discharge - patient reports daughter can provide increased assistance upon discharge. If patient discharges prior to next session this note will serve as a discharge summary. Please see below for the latest assessment towards goals.      2-3 sessions per week, S Level 3, Patient would benefit from continued therapy after discharge   PT Equipment Recommendations  Equipment Needed: No(has RW at home)    Assessment   Body structures, Functions, Activity limitations: Decreased functional mobility ; Decreased safe awareness;Decreased endurance;Decreased balance  Assessment: Pt present below baseline function due to significant gait and balance concerns during ambulation. Pt has significant history of falls and neuropathy in BLE that contribute to more poor gait patterns such as narrow MICHELE and difficulty clearing toes sometimes. She uses her UE for support and balance, which contributes to fatigue after ambulation. For safety and to improve the above deficits, pt would continue to benefit from skilled therapy. Treatment Diagnosis: Decreased functional mobility, safety awareness, balance, and endurance with ambulation  Prognosis: Good  Decision Making: Medium Complexity  History: hx of TIA and CVA  Exam: MMT, ROM, mobility  Clinical Presentation: evolving - decreased balance and recent increase in falls  PT Education: Plan of Care;PT Role  Patient Education: Pt educated on DC plan, unsure and would like some time to think about it and discuss with daugther  Barriers to Learning: none  REQUIRES PT FOLLOW UP: Yes  Activity Tolerance  Activity Tolerance: Patient Tolerated treatment well;Patient limited by fatigue  Activity Tolerance: Pt limited slightly this session by fatigue with ambulation. Felt like she had to use more energy in arms and that causes her to be tired. Patient Diagnosis(es): The primary encounter diagnosis was Fall, initial encounter. Diagnoses of Closed head injury, initial encounter and Abnormal CT of the head were also pertinent to this visit.      has a past medical history of Allergic rhinitis, cause unspecified, Arthritis, Atrial fibrillation ([facility] Bronchopneumonia, CAD (coronary artery disease), Cerebral artery occlusion with cerebral infarction [facility]), Chronic gouty arthropathy, Chronic kidney disease, Congestive heart failure, unspecified, Degeneration of cervical intervertebral disc, Essential and other specified forms of tremor, Gout, HIGH CHOLESTEROL, History of CVA (cerebrovascular accident), Hx of blood clots, Hypertension, Influenza, Mitral valve stenosis and aortic valve insufficiency, Movement disorder, Pacemaker, Peptic ulcer, unspecified site, unspecified as acute or chronic, without mention of hemorrhage, perforation, or obstruction, Thyroid disease, Unspecified disorder of kidney and ureter, and Unspecified hypothyroidism. has a past surgical history that includes back surgery; Cholecystectomy; Cardiac surgery; Coronary artery bypass graft (1987); shoulder surgery; Cardiac catheterization (7/2012); hip surgery (Left, 03/16/2017); joint replacement; Cardiac catheterization (08/07/2018); Percutaneous Transluminal Coronary Angio (11/04/2014); pr inject anes/steroid foramen lumbar/sacral w img guide ,1 level (Right, 12/3/2018); Femoral-femoral Bypass Graft (N/A, 8/22/2019); and femoral bypass (Left, 8/22/2019). Restrictions  Restrictions/Precautions  Restrictions/Precautions: Fall Risk(high fall risk, general diet)  Required Braces or Orthoses?: No  Position Activity Restriction  Other position/activity restrictions: Supriya Tracey is a [de-identified] y.o. female with past medical history of atrial fibrillation anticoagulated on Xarelto, CAD, previous TIA, CHF, hypertension, chronic kidney disease, hyperlipidemia and thyroid disease who presents to the ED with complaint of a fall. Patient states he fell last night and hit her head. Patient states she hit the back of her head. Denies any loss of consciousness. Patient states she is on Xarelto and Plavix at home. Patient states he came to the ED for further evaluation and treatment. Patient states she cannot remember why she fell. Apparently has problems with her legs and problems with her balance chronically.   States she believes she potentially just lost her balance but cannot say for certain that was the cause. Patient denies any lightheadedness or dizziness prior to falling. Denies headache, visual changes, speech disturbances, numbness/tingling, chest pain, shortness of breath, abdominal pain, nausea/vomiting, urinary symptoms or changes in bowel movements. Patient states she is an aching pain rated 3/10 to her posterior head and neck. Denies any other injury or trauma from the fall. Denies taking any over-the-counter medication for symptom troll. Became concerned and came to the ED for further evaluation and treatment. Vision/Hearing  Vision: Impaired  Vision Exceptions: Wears glasses for reading  Hearing: Exceptions to [facility]  Hearing Exceptions: Hard of hearing/hearing concerns     Subjective  General  Chart Reviewed:  Yes  Additional Pertinent Hx: Hx of TIA and CVA  Family / Caregiver Present: No  Diagnosis: Fall at home, initial encounter  Follows Commands: Within Functional Limits  General Comment  Comments: Pt found laying in bed awake with alarm on  Subjective  Subjective: Pt denies pain at this time and is agreeable to therapy  Pain Screening  Patient Currently in Pain: Denies  Vital Signs  Patient Currently in Pain: Denies       Orientation  Orientation  Overall Orientation Status: Within Functional Limits  Social/Functional History  Social/Functional History  Lives With: Alone  Type of Home: House(ranch)  Home Layout: Able to Live on Main level with bedroom/bathroom, One level  Home Access: Stairs to enter without rails  Entrance Stairs - Number of Steps: 2  Bathroom Shower/Tub: Tub/Shower unit  Bathroom Toilet: Handicap height  Bathroom Equipment: Shower chair  Bathroom Accessibility: Walker accessible  Home Equipment: Rolling walker, Cane(uses RW at baseline)  Receives Help From: Family(daughter)  ADL Assistance: Independent  Homemaking Assistance: Needs assistance(Daughter helps with cleaning, appointments)  Homemaking Responsibilities: Yes  Ambulation Assistance: Independent(with RW)  Transfer Assistance: Independent  Active : Yes(Rarely drive)  Occupation: Retired  Type of occupation: grocery stores  Leisure & Hobbies: play with small dog  Additional Comments: Pt reports dizziness the last year, doctors and pt unsure of what is causing this. Bending is one factor that causes this. Pt feels \"off balance\" when dizziness occurs. Pt reports no other falls in past 6 months except for one that caused hospitalization. Reports not dizzy with this fall but fell backwards, \"it was different\". Cognition        Objective     Observation/Palpation  Posture: Fair  Observation: Rounded shoulders with slightly forward flexed posture with RW    PROM RLE (degrees)  RLE PROM: Exceptions  RLE General PROM: Knee extension slightly limited with hard end feel. Pt reports having pain and trouble with knee at baseline, sometimes it feels like it \"bows a bit\". AROM RLE (degrees)  RLE AROM: WFL  RLE General AROM: Knee extension slightly limited  PROM LLE (degrees)  LLE General PROM: Knee extension slightly limited with hard end feel. Pt reports having pain and trouble with knee at baseline, sometimes it feels like it \"bows a bit\". AROM LLE (degrees)  LLE AROM : WFL  LLE General AROM: Knee extension slightly limited  Strength RLE  Strength RLE: WFL  Comment: hip flexion 3+/5, knee extension 4/5, dorsiflexion 4/5  Strength LLE  Strength LLE: WFL  Comment: hip flexion 3+/5, knee extension 4/5, dorsiflexion 3+/5     Sensation  Overall Sensation Status: Impaired(Reports has neuropathy from thighs down to feet. WNL in hands)  Bed mobility  Supine to Sit: Independent  Scooting: Independent  Comment: HOB elevated  Transfers  Sit to Stand: Contact guard assistance(x1 from EOB, x1 from toilet, x1 from chair)  Stand to sit: Contact guard assistance(x1 to toilet, x2 to chair)  Comment: Pt able to transfer from EOB and chair with hands on bed and to full upright standing without RW.  Able to sit to and stand from toilet without HR assistance, hands reaching back/on knees. Ambulation  Ambulation?: Yes  Ambulation 1  Surface: level tile  Device: Rolling Walker  Assistance: Contact guard assistance  Quality of Gait: Decreased maximilian, narrow MICHELE (crossing midline at times during swing phase), decreased step length/height, slight difficulty with toe clearance  Gait Deviations: Slow Maximilian;Decreased step length;Decreased step height  Distance: 12 feet + 150 feet  Comments: Pt did not demonstrate any LOB or unsteadiness during gait, but showed multiple gait deviations that decreased safety and required UE assistance for balance. Reporeted significant fatigue coming out of bathroom, vitals taken. /58 and ocygen 94% at room air. VC and demonstration given during longer ambulation, but due to neuropathy pt had difficulty following commands consistently. Reported some fatigue after ambulation, especially in arms. Stairs/Curb  Stairs?: No     Balance  Posture: Fair  Sitting - Static: Good  Sitting - Dynamic: Good  Standing - Static: Fair;+(Able to stand for 3-4 min at sink without UE support. Narrow MICHELE and would reach out occasionally to reposition feet at sink.)  Standing - Dynamic: Fair(Multiple gait deviations that impair balance and require more UE support to ensure safety during ambulation.)        Plan   Plan  Times per week: 3-5x/week  Times per day: Daily  Current Treatment Recommendations: Functional Mobility Training, Transfer Training, Stair training, Gait Training, Safety Education & Training, Patient/Caregiver Education & Training  Safety Devices  Type of devices:  All fall risk precautions in place, Gait belt, Patient at risk for falls, Call light within reach, Chair alarm in place, Nurse notified, Left in chair      AM-PAC Score  AM-PAC Inpatient Mobility Raw Score : 20 (11/08/20 1030)  AM-PAC Inpatient T-Scale Score : 47.67 (11/08/20 1030)  Mobility Inpatient CMS 0-100% Score: 35.83 (11/08/20 1030)  Mobility Inpatient CMS G-Code Modifier : Alcon Llanos (11/08/20 1030)          Goals  Short term goals  Time Frame for Short term goals: at discharge  Short term goal 1: All transfers mod I  Short term goal 2: Ambulation of 200 feet with RW and mod I  Short term goal 3: Stair ambulation of 2 steps without hand rails with SBA  Patient Goals   Patient goals : to return home at baseline function       Therapy Time   Individual Concurrent Group Co-treatment   Time In 0849         Time Out 0935         Minutes 46         Timed Code Treatment Minutes: [address] [facility], [facility]  Molly Zaragoza, PT   Therapist observed and directed the above evaluation.  Thanks, Molly Zaragoza, [address] [facility], DPT [id number]

## 2020-11-08 NOTE — CONSULTS
NEUROLOGY CONSULTATION     Patient's Name :   Jaz Mercado        YOB: 1940                    Date of Consultation : 11/8/2020 11:46 AM    Referring Physician :  Kelly Pritchett MD    Reason for Consultation :  Recent fall, abnormal CT head    HISTORY OF PRESENT ILLNESS      Nancyalexus Stockton is a [de-identified] y.o. female   History was obtained from patient and from dictations in the chart. Additional history was obtained from the patient's family at the bedside  Patient apparently suddenly had a fall. She states that she fell backwards and does not remember why. She is on anticoagulant medication and was concerned about any head injury or bleeding in the brain. She came to the hospital.  CT head was done. It showed an low-density change in the right frontal lobe but seem to be somewhat chronic but new compared to a previous study from February 2020. Patient denies any vertigo or diplopia. She denies any focal weakness or numbness. She has had chronic dizziness. Symptom onset was acute abrupt and moderately severe without any clear aggravating or relieving factors. REVIEW OF SYSTEMS     Denies any chest pain palpitations breathlessness abdominal pain fever or weight loss.   Rest of the 14 system review was reviewed and was negative except for the symptoms noted above    Past Medical History:   Diagnosis Date    Allergic rhinitis, cause unspecified     Arthritis     Atrial fibrillation ([address]     Bronchopneumonia     CAD (coronary artery disease)     stent:  post cataract surgery (CABG)    Cerebral artery occlusion with cerebral infarction ([address]     TIA    Chronic gouty arthropathy     Chronic kidney disease     Congestive heart failure, unspecified     Degeneration of cervical intervertebral disc     Essential and other specified forms of tremor     Gout     HIGH CHOLESTEROL     History of CVA (cerebrovascular accident)     Hx of blood clots     Hypertension     Influenza 2017    Mitral valve stenosis and aortic valve insufficiency     Movement disorder     back problems    Pacemaker     Peptic ulcer, unspecified site, unspecified as acute or chronic, without mention of hemorrhage, perforation, or obstruction     Thyroid disease     Unspecified disorder of kidney and ureter     Unspecified hypothyroidism      Family History   Problem Relation Age of Onset    Cancer Sister     Heart Disease Sister     Diabetes Brother     Hypertension Brother     Heart Disease Brother     Stroke Maternal Aunt     Heart Disease Maternal Aunt     Diabetes Maternal Uncle     Hypertension Paternal Aunt     Cancer Maternal Grandmother     Cancer Paternal Grandmother     Rheum Arthritis Neg Hx     Lupus Neg Hx      Social History     Socioeconomic History    Marital status:       Spouse name: Hemant Duncan Number of children: 3    Years of education: 15    Highest education level: None   Occupational History    None   Social Needs    Financial resource strain: None    Food insecurity     Worry: None     Inability: None    Transportation needs     Medical: None     Non-medical: None   Tobacco Use    Smoking status: Former Smoker     Packs/day: 1.00     Years: 28.00     Pack years: 28.00     Types: Cigarettes     Last attempt to quit: 1987     Years since quittin.8    Smokeless tobacco: Never Used    Tobacco comment: H.O.smoking at age 15 / smoked up to 1 p.p.d / quit    Substance and Sexual Activity    Alcohol use: Not Currently     Alcohol/week: 0.0 standard drinks    Drug use: No    Sexual activity: Not Currently   Lifestyle    Physical activity     Days per week: None     Minutes per session: None    Stress: None   Relationships    Social connections     Talks on phone: None     Gets together: None     Attends Restorationist service: None     Active member of club or organization: None Attends meetings of clubs or organizations: None     Relationship status: None    Intimate partner violence     Fear of current or ex partner: None     Emotionally abused: None     Physically abused: None     Forced sexual activity: None   Other Topics Concern    None   Social History Narrative    None     Current Facility-Administered Medications   Medication Dose Route Frequency Provider Last Rate Last Dose    topiramate (TOPAMAX) 25 MG tablet             clopidogrel (PLAVIX) tablet 75 mg  75 mg Oral Daily Jacki Martines MD   75 mg at 11/08/20 0745    levothyroxine (SYNTHROID) tablet 112 mcg  112 mcg Oral Daily Jacki Martines MD   112 mcg at 11/08/20 0631    meclizine (ANTIVERT) tablet 12.5 mg  12.5 mg Oral TID PRN Jacki Martines MD        metoprolol succinate (TOPROL XL) extended release tablet 25 mg  25 mg Oral BID Jacki Martines MD   25 mg at 11/08/20 0745    topiramate (TOPAMAX) tablet 50 mg  50 mg Oral BID Jacki Martines MD   50 mg at 11/08/20 0744    torsemide (DEMADEX) tablet 10 mg  10 mg Oral Daily Jacki Martines MD   10 mg at 11/08/20 0745    rivaroxaban (XARELTO) tablet 15 mg  15 mg Oral Daily Jacki Martines MD        sodium chloride flush 0.9 % injection 10 mL  10 mL Intravenous 2 times per day Jacki Martines MD   10 mL at 11/07/20 2116    sodium chloride flush 0.9 % injection 10 mL  10 mL Intravenous PRN Jacki Martines MD        acetaminophen (TYLENOL) tablet 650 mg  650 mg Oral Q6H PRN Jacki Martines MD   650 mg at 11/07/20 2124    Or    acetaminophen (TYLENOL) suppository 650 mg  650 mg Rectal Q6H PRN Jacki Martines MD        polyethylene glycol (GLYCOLAX) packet 17 g  17 g Oral Daily PRN Jacki Martines MD        promethazine (PHENERGAN) tablet 12.5 mg  12.5 mg Oral Q6H PRN Jacki Martines MD        Or    ondansetron (ZOFRAN) injection 4 mg  4 mg Intravenous Q6H PRN Jacki Martines MD        trospium (SANCTURA) tablet 20 mg  20 mg Oral Nightly Jacki Martines MD   20 mg at 11/07/20 8605 Allergies   Allergen Reactions    Aspirin Nausea Only    Ativan [Lorazepam] Other (See Comments)     hallucinations    Diltiazem Anaphylaxis    Sulfa Antibiotics Rash and Hives    Dabigatran Nausea Only     And indigestion  And indigestion    Aka Pradaxa    Lipitor [Atorvastatin] Other (See Comments)     Muscle pains    Mysoline [Primidone]     Nsaids     Other Other (See Comments)     Nitroglycerin patches causes severe headaches       PHYSICAL EXAMINATION:  BP (!) 145/84   Pulse 70   Temp 97.8 °F (36.6 °C) (Oral)   Resp 18   Ht 5' 3\" (1.6 m)   Wt 126 lb 1.6 oz (57.2 kg)   SpO2 99%   BMI 22.34 kg/m²   Appearance: Well appearing, well nourished and in no distress  Mental Status Exam: Patient is alert, oriented to person, place and time. Recent and remote memory is normal  Fund of Knowledge is normal  Attention/concentration is normal.   Speech : No dysarthria  Language : No aphasia  Funduscopic Exam: sharp disc margins  Cranial Nerves:   II: Visual fields:  Full to confrontation  III: Pupils:  equal, round, reactive to light  III,IV,VI: Extra Ocular Movements are intact. No nystagmus  V: Facial sensation is intact to pin prick and light touch  VII: Facial strength and movements: intact and symmetric smile,cheek puffing and eyebrow elevation  VIII: Hearing:  Intact to finger rub bilaterally  IX: Palate  elevation is symmetric  XI: Shoulder shrug is intact  XII: Tongue movements are normal  Motor:  Muscle tone and bulk are normal.   Strength is symmetrical 5/5 in all four extremities. Sensory: Decreased to light touch in the distal lower extremities. Coordination:  Normal  Finger to Nose and Heel to Shin bilaterally    . Reflexes:  DTR 1 in the upper extremities and diminished in the lower extremities  Plantar response: Flexor bilaterally  Gait: Gait is slightly impaired  Romberg: negative  Vascular: No carotid bruit bilaterally        DATA:  LABS:  General Labs:    CBC:   Lab Results Component Value Date    WBC 10.2 11/07/2020    RBC 5.02 11/07/2020    HGB 14.2 11/07/2020    HCT 44.7 11/07/2020    MCV 89.1 11/07/2020    MCH 28.3 11/07/2020    MCHC 31.8 11/07/2020    RDW 20.8 11/07/2020     11/07/2020    MPV 8.5 11/07/2020     BMP:    Lab Results   Component Value Date     11/07/2020    K 4.2 11/07/2020     11/07/2020    CO2 24 11/07/2020    BUN 38 11/07/2020    LABALBU 4.3 11/07/2020    CREATININE 1.5 11/07/2020    CALCIUM 9.7 11/07/2020    GFRAA 40 11/07/2020    GFRAA 38 04/02/2013    LABGLOM 33 11/07/2020    GLUCOSE 93 11/07/2020    GLUCOSE 82 07/01/2020     RADIOLOGY REVIEW:  I have reviewed radiology image(s) and reports(s) of: CT scan of the head    IMPRESSION :  Mild ataxia probably due to peripheral neuropathy  Chronic atrial fibrillation  Abnormal CT head which shows a low-density lesion which appears chronic in the right frontal lobe. This is most likely old embolic stroke from her chronic atrial fibrillation. Patient is already on anticoagulation with Xarelto and is also on Plavix as per cardiology. Mass lesions were considered in the differential diagnosis but felt to be much less likely.   MRI brain cannot be done because that she has an incompatible pacemaker  Carotid Doppler studies done earlier this year did not show any hemodynamically significant stenosis  Patient Active Problem List   Diagnosis    Chronic atrial fibrillation    Mixed hyperlipidemia    Chronic gouty arthropathy    Acquired hypothyroidism    Arthritis    Non-rheumatic mitral regurgitation    Restrictive lung disease    Sick sinus syndrome (HCC)    Allergic rhinitis    Fibrocystic breast    [facility]bro[facility] (CVA) [address] [address]    HTN (hypertension), benign    Pacemaker    Primary osteoarthritis involving multiple joints    Vitamin D deficiency    Tremor    Chronic total occlusion of native coronary artery    Age related osteoporosis    Acute on chronic systolic

## 2020-11-08 NOTE — PLAN OF CARE
Problem: Falls - Risk of:  Goal: Will remain free from falls  Description: Will remain free from falls  Outcome: Ongoing  Goal: Absence of physical injury  Description: Absence of physical injury  Outcome: Ongoing     Problem: Mobility - Impaired:  Goal: Mobility will improve  Description: Mobility will improve  Outcome: Ongoing

## 2020-11-08 NOTE — H&P
occlusion with cerebral infarction ([facility] [address] Chronic gouty arthropathy, Chronic kidney disease, Congestive heart failure, unspecified, Degeneration of cervical intervertebral disc, Essential and other specified forms of tremor, Gout, HIGH CHOLESTEROL, History of CVA (cerebrovascular accident), Hx of blood clots, Hypertension, Influenza, Mitral valve stenosis and aortic valve insufficiency, Movement disorder, Pacemaker, Peptic ulcer, unspecified site, unspecified as acute or chronic, without mention of hemorrhage, perforation, or obstruction, Thyroid disease, Unspecified disorder of kidney and ureter, and Unspecified hypothyroidism. Past Surgical History:   has a past surgical history that includes back surgery; Cholecystectomy; Cardiac surgery; Coronary artery bypass graft (1987); shoulder surgery; Cardiac catheterization (7/2012); hip surgery (Left, 03/16/2017); joint replacement; Cardiac catheterization (08/07/2018); Percutaneous Transluminal Coronary Angio (11/04/2014); pr inject anes/steroid foramen lumbar/sacral w img guide ,1 level (Right, 12/3/2018); Femoral-femoral Bypass Graft (N/A, 8/22/2019); and femoral bypass (Left, 8/22/2019). Medications:  No current facility-administered medications on file prior to encounter.       Current Outpatient Medications on File Prior to Encounter   Medication Sig Dispense Refill    torsemide (DEMADEX) 10 MG tablet TAKE 1 TABLET EVERY OTHER DAY ALTERNATING WITH  2  TABLETS EVERY OTHER  tablet 0    allopurinol (ZYLOPRIM) 300 MG tablet TAKE 1 TABLET EVERY DAY 90 tablet 1    clopidogrel (PLAVIX) 75 MG tablet Take 1 tablet by mouth daily 90 tablet 0    tolterodine (DETROL LA) 2 MG extended release capsule Take 1 capsule by mouth daily (Patient not taking: Reported on 10/22/2020) 30 capsule 0    traZODone (DESYREL) 50 MG tablet Take 1 tablet by mouth nightly 30 tablet 5    levothyroxine (SYNTHROID) 112 MCG tablet TAKE 1 TABLET EVERY DAY 90 tablet 1    vitamin D (ERGOCALCIFEROL) 1.25 MG (36072 UT) CAPS capsule Take 1 capsule by mouth once a week 12 capsule 3    meclizine (ANTIVERT) 12.5 MG tablet Take 1 tablet by mouth 3 times daily as needed for Dizziness or Nausea (Patient not taking: Reported on 10/22/2020) 90 tablet 0    metoprolol succinate (TOPROL XL) 100 MG extended release tablet Take 1 tablet by mouth 2 times daily 180 tablet 1    XARELTO 15 MG TABS tablet TAKE 1 TABLET DAILY WITH BREAKFAST 90 tablet 3    allopurinol (ZYLOPRIM) 100 MG tablet Take 1 tablet by mouth daily 90 tablet 1    topiramate (TOPAMAX) 50 MG tablet Take 1 tablet by mouth 2 times daily 60 tablet 3     Current Facility-Administered Medications   Medication Dose Route Frequency Provider Last Rate Last Dose    clopidogrel (PLAVIX) tablet 75 mg  75 mg Oral Daily Dominique Rizvi MD        levothyroxine (SYNTHROID) tablet 112 mcg  112 mcg Oral Daily Dominique Rizvi MD        meclizine (ANTIVERT) tablet 12.5 mg  12.5 mg Oral TID PRN Dominique Rizvi MD        metoprolol succinate (TOPROL XL) extended release tablet 25 mg  25 mg Oral BID Dominique Rizvi MD   25 mg at 11/07/20 2115    topiramate (TOPAMAX) tablet 50 mg  50 mg Oral BID Dominique Rizvi MD   50 mg at 11/07/20 2115    torsemide (DEMADEX) tablet 10 mg  10 mg Oral Daily Dominique Rizvi MD        rivaroxaban (XARELTO) tablet 15 mg  15 mg Oral Daily Dominique Rizvi MD        sodium chloride flush 0.9 % injection 10 mL  10 mL Intravenous 2 times per day Dominique Rizvi MD   10 mL at 11/07/20 2116    sodium chloride flush 0.9 % injection 10 mL  10 mL Intravenous PRN Dominique Rizvi MD        acetaminophen (TYLENOL) tablet 650 mg  650 mg Oral Q6H PRN Dominique Rizvi MD   650 mg at 11/07/20 2124    Or    acetaminophen (TYLENOL) suppository 650 mg  650 mg Rectal Q6H PRN Dominique Rizvi MD        polyethylene glycol (GLYCOLAX) packet 17 g  17 g Oral Daily PRN Dominique Rizvi MD        promethazine (PHENERGAN) tablet 12.5 mg  12.5 mg Oral Q6H PRN Alycia Andrews, MD        Or    ondansetron (ZOFRAN) injection 4 mg  4 mg Intravenous Q6H PRN Dom Perera MD        [facility]) tablet 20 mg  20 mg Oral Nightly Dom Perera MD   20 mg at 11/07/20 2115     Allergies: Allergies   Allergen Reactions    Aspirin Nausea Only    Ativan [Lorazepam] Other (See Comments)     hallucinations    Diltiazem Anaphylaxis    Sulfa Antibiotics Rash and Hives    Dabigatran Nausea Only     And indigestion  And indigestion    Aka Pradaxa    Lipitor [Atorvastatin] Other (See Comments)     Muscle pains    Mysoline [Primidone]     Nsaids     Other Other (See Comments)     Nitroglycerin patches causes severe headaches        Social History:   reports that she quit smoking about 33 years ago. Her smoking use included cigarettes. She has a 28.00 pack-year smoking history. She has never used smokeless tobacco. She reports previous alcohol use. She reports that she does not use drugs. Family History:  family history includes Cancer in her maternal grandmother, paternal grandmother, and sister; Diabetes in her brother and maternal uncle; Heart Disease in her brother, maternal aunt, and sister; Hypertension in her brother and paternal aunt; Stroke in her maternal aunt. , \    Physical Exam:  /64   Pulse 70   Temp 97.7 °F (36.5 °C) (Oral)   Resp 16   Ht 5' 3\" (1.6 m)   Wt 126 lb 1.6 oz (57.2 kg)   SpO2 94%   BMI 22.34 kg/m²     General appearance:  Appears comfortable. Well nourished  Eyes: Sclera clear, pupils equal  ENT: Moist mucus membranes, no thrush. Trachea midline. Cardiovascular: Regular rhythm, normal S1, S2. No murmur, gallop, rub. No edema in lower extremities  Respiratory: Clear to auscultation bilaterally, no wheeze, good inspiratory effort  Gastrointestinal: Abdomen soft, non-tender, not distended, normal bowel sounds  Musculoskeletal: No cyanosis in digits, neck supple  Neurology: Cranial nerves grossly intact. Alert and oriented in time, place and person. No speech or motor deficits  Psychiatry: Appropriate affect. Not agitated  Skin: Warm, dry, normal turgor, no rash    Labs:  CBC:   Lab Results   Component Value Date    WBC 10.2 11/07/2020    RBC 5.02 11/07/2020    HGB 14.2 11/07/2020    HCT 44.7 11/07/2020    MCV 89.1 11/07/2020    MCH 28.3 11/07/2020    MCHC 31.8 11/07/2020    RDW 20.8 11/07/2020     11/07/2020    MPV 8.5 11/07/2020     BMP:    Lab Results   Component Value Date     11/07/2020    K 4.2 11/07/2020     11/07/2020    CO2 24 11/07/2020    BUN 38 11/07/2020    CREATININE 1.5 11/07/2020    CALCIUM 9.7 11/07/2020    GFRAA 40 11/07/2020    GFRAA 38 04/02/2013    LABGLOM 33 11/07/2020    GLUCOSE 93 11/07/2020    GLUCOSE 82 07/01/2020       Chest Xray:   EKG:        Problem List  Active Problems:    Fall at home, initial encounter  Resolved Problems:    * No resolved hospital problems. *        Assessment/Plan:         1. Fall  CT head is neg for fracture and hemorrhage  Pt chronically anticoagulated  Pt cannot get MRI d/t incompatible pacemaker   Pt/OT  Cont to monitor h/h  SW for dc planning    Chronic stage 3 renal failure    Admit as inpatient. I anticipate hospitalization spanning more than two midnights for investigation and treatment of the above medically necessary diagnoses.       Tanya Muro MD    11/07/20

## 2020-11-09 ENCOUNTER — CARE COORDINATION (OUTPATIENT)
Dept: CASE MANAGEMENT | Age: 80
End: 2020-11-09

## 2020-11-09 ENCOUNTER — HOSPITAL ENCOUNTER (OUTPATIENT)
Age: 80
Discharge: HOME | End: 2020-11-09
Payer: MEDICARE

## 2020-11-09 ENCOUNTER — TELEPHONE (OUTPATIENT)
Dept: FAMILY MEDICINE CLINIC | Age: 80
End: 2020-11-09

## 2020-11-09 ENCOUNTER — OFFICE VISIT (OUTPATIENT)
Dept: FAMILY MEDICINE CLINIC | Age: 80
End: 2020-11-09
Payer: MEDICARE

## 2020-11-09 VITALS
BODY MASS INDEX: 22.67 KG/M2 | WEIGHT: 128 LBS | SYSTOLIC BLOOD PRESSURE: 122 MMHG | DIASTOLIC BLOOD PRESSURE: 62 MMHG | HEART RATE: 68 BPM | OXYGEN SATURATION: 96 % | TEMPERATURE: 97 F

## 2020-11-09 LAB
ALBUMIN SERPL-MCNC: 4.1 G/DL (ref 3.4–5)
ANION GAP SERPL CALCULATED.3IONS-SCNC: 14 MMOL/L (ref 3–16)
BUN BLDV-MCNC: 34 MG/DL (ref 7–20)
CALCIUM SERPL-MCNC: 9.4 MG/DL (ref 8.3–10.6)
CHLORIDE BLD-SCNC: 108 MMOL/L (ref 99–110)
CO2: 26 MMOL/L (ref 21–32)
CREAT SERPL-MCNC: 1.5 MG/DL (ref 0.6–1.2)
CREATININE URINE: 46.9 MG/DL (ref 28–259)
GFR AFRICAN AMERICAN: 40
GFR NON-AFRICAN AMERICAN: 33
GLUCOSE BLD-MCNC: 101 MG/DL (ref 70–99)
HCT VFR BLD CALC: 46.5 % (ref 36–48)
HEMOGLOBIN: 14.6 G/DL (ref 12–16)
MCH RBC QN AUTO: 27.5 PG (ref 26–34)
MCHC RBC AUTO-ENTMCNC: 31.3 G/DL (ref 31–36)
MCV RBC AUTO: 87.9 FL (ref 80–100)
MICROALBUMIN UR-MCNC: 2.1 MG/DL
MICROALBUMIN/CREAT UR-RTO: 44.8 MG/G (ref 0–30)
PARATHYROID HORMONE INTACT: 45.2 PG/ML (ref 14–72)
PDW BLD-RTO: 20.8 % (ref 12.4–15.4)
PHOSPHORUS: 3.1 MG/DL (ref 2.5–4.9)
PLATELET # BLD: 385 K/UL (ref 135–450)
PMV BLD AUTO: 8.6 FL (ref 5–10.5)
POTASSIUM SERPL-SCNC: 4.3 MMOL/L (ref 3.5–5.1)
RBC # BLD: 5.3 M/UL (ref 4–5.2)
SODIUM BLD-SCNC: 148 MMOL/L (ref 136–145)
TSH SERPL DL<=0.05 MIU/L-ACNC: 0.23 UIU/ML (ref 0.27–4.2)
WBC # BLD: 12.3 K/UL (ref 4–11)

## 2020-11-09 PROCEDURE — 85027 COMPLETE CBC AUTOMATED: CPT

## 2020-11-09 PROCEDURE — G8482 FLU IMMUNIZE ORDER/ADMIN: HCPCS | Performed by: FAMILY MEDICINE

## 2020-11-09 PROCEDURE — 84443 ASSAY THYROID STIM HORMONE: CPT

## 2020-11-09 PROCEDURE — 4040F PNEUMOC VAC/ADMIN/RCVD: CPT | Performed by: FAMILY MEDICINE

## 2020-11-09 PROCEDURE — G8420 CALC BMI NORM PARAMETERS: HCPCS | Performed by: FAMILY MEDICINE

## 2020-11-09 PROCEDURE — 1090F PRES/ABSN URINE INCON ASSESS: CPT | Performed by: FAMILY MEDICINE

## 2020-11-09 PROCEDURE — G8399 PT W/DXA RESULTS DOCUMENT: HCPCS | Performed by: FAMILY MEDICINE

## 2020-11-09 PROCEDURE — 83540 ASSAY OF IRON: CPT

## 2020-11-09 PROCEDURE — 82043 UR ALBUMIN QUANTITATIVE: CPT

## 2020-11-09 PROCEDURE — 1036F TOBACCO NON-USER: CPT | Performed by: FAMILY MEDICINE

## 2020-11-09 PROCEDURE — 20610 DRAIN/INJ JOINT/BURSA W/O US: CPT | Performed by: FAMILY MEDICINE

## 2020-11-09 PROCEDURE — G8427 DOCREV CUR MEDS BY ELIG CLIN: HCPCS | Performed by: FAMILY MEDICINE

## 2020-11-09 PROCEDURE — 1123F ACP DISCUSS/DSCN MKR DOCD: CPT | Performed by: FAMILY MEDICINE

## 2020-11-09 PROCEDURE — 80069 RENAL FUNCTION PANEL: CPT

## 2020-11-09 PROCEDURE — 83970 ASSAY OF PARATHORMONE: CPT

## 2020-11-09 PROCEDURE — 82570 ASSAY OF URINE CREATININE: CPT

## 2020-11-09 PROCEDURE — 82306 VITAMIN D 25 HYDROXY: CPT

## 2020-11-09 PROCEDURE — 1111F DSCHRG MED/CURRENT MED MERGE: CPT | Performed by: FAMILY MEDICINE

## 2020-11-09 PROCEDURE — 99214 OFFICE O/P EST MOD 30 MIN: CPT | Performed by: FAMILY MEDICINE

## 2020-11-09 PROCEDURE — 83550 IRON BINDING TEST: CPT

## 2020-11-09 RX ORDER — ERGOCALCIFEROL 1.25 MG/1
50000 CAPSULE ORAL WEEKLY
Qty: 12 CAPSULE | Refills: 3
Start: 2020-11-09 | End: 2021-05-26 | Stop reason: SDUPTHER

## 2020-11-09 RX ORDER — DULOXETIN HYDROCHLORIDE 30 MG/1
30 CAPSULE, DELAYED RELEASE ORAL DAILY
Qty: 30 CAPSULE | Refills: 5 | Status: SHIPPED | OUTPATIENT
Start: 2020-11-09 | End: 2020-12-08

## 2020-11-09 RX ORDER — TRIAMCINOLONE ACETONIDE 40 MG/ML
40 INJECTION, SUSPENSION INTRA-ARTICULAR; INTRAMUSCULAR ONCE
Status: COMPLETED | OUTPATIENT
Start: 2020-11-09 | End: 2020-11-09

## 2020-11-09 RX ADMIN — TRIAMCINOLONE ACETONIDE 40 MG: 40 INJECTION, SUSPENSION INTRA-ARTICULAR; INTRAMUSCULAR at 11:19

## 2020-11-09 NOTE — PROGRESS NOTES
Subjective:      Patient ID: Tonny Pryor is a [de-identified] y.o. female. CC: Patient presents for acute medical problem-right leg pain, tenderness of the right earlobe, hair loss and recent CT scan demonstrating an old stroke. . Medical assistant notes reviewed. HPIPatient presents with right leg/knee pain. Patient has been having problems walking. She states she is having pain in her right leg and knee area. She has difficulty at times putting weight on her leg because of discomfort. She denies any trauma to the leg. Patient has a sore on the right earlobe. She states it hurts to lay on that side. Patient was treated with a cortisone cream per ENT specially 1 year ago and her symptoms did improve but she is not continues to cortisone cream.  She states also her hair has been falling out. Patient states she got up out of her chair and she went backwards and fell on November 6. She states she fell and hit the back of her head. She went to [facility] on November 7 due to a fall the night before. Patient states the tests showed that she had a stroke in the past 6 months. Patient is also concerned that she is having significant hair loss in the last 4 to 6 weeks. She has had a lot of stressors with her 's death and her health condition declining to some extent. Patient does have an appoint with neurology in the future. Patient also feels the neuropathy in her legs is worsening of the left leg worse than the right leg. She does have some difficulty sleeping with the symptoms. The trazodone medication does help her sleep but it does not help particular the neuropathy. Patient passes been on both gabapentin and Lyrica with side effects.     Review of Systems     Patient Active Problem List   Diagnosis    Chronic atrial fibrillation    Mixed hyperlipidemia    Chronic gouty arthropathy    Acquired hypothyroidism    Arthritis    Non-rheumatic mitral regurgitation    Restrictive lung disease    Sick sinus syndrome (HCC)    Allergic rhinitis    Fibrocystic breast    C[facility]brovascular accident (CVA) ([facility] [address]    HTN (hypertension), benign    Pacemaker    Primary osteoarthritis involving multiple joints    Vitamin D deficiency    Tremor    Chronic total occlusion of native coronary artery    Age related osteoporosis    Acute on chronic systolic (congestive) heart failure (HCC)    Chronic renal failure, stage 3 (moderate)    PAD (peripheral artery disease) ([facility])    Atherosclerosis of native arteries of extremities with intermittent claudication, bilateral legs ([facility])    Idiopathic peripheral neuropathy    Ischemic cardiomyopathy    Dizziness    Peripheral vertigo, bilateral    PAF (paroxysmal atrial fibrillation) ([facility])    Dyslipidemia    Heart failure ([facility])    Generalized weakness    Iron deficiency anemia    Anemia    Bilateral sensorineural hearing loss    Memory loss due to medical condition    Fall at home, initial encounter    Abnormal CT of the head       Outpatient Medications Marked as Taking for the 11/9/20 encounter (Office Visit) with Alisha Cabral MD   Medication Sig Dispense Refill    metoprolol succinate (TOPROL XL) 25 MG extended release tablet Take 1 tablet by mouth 2 times daily 60 tablet 1    torsemide (DEMADEX) 10 MG tablet TAKE 1 TABLET EVERY OTHER DAY ALTERNATING WITH  2  TABLETS EVERY OTHER  tablet 0    clopidogrel (PLAVIX) 75 MG tablet Take 1 tablet by mouth daily 90 tablet 0    traZODone (DESYREL) 50 MG tablet Take 1 tablet by mouth nightly 30 tablet 5    levothyroxine (SYNTHROID) 112 MCG tablet TAKE 1 TABLET EVERY DAY 90 tablet 1    XARELTO 15 MG TABS tablet TAKE 1 TABLET DAILY WITH BREAKFAST 90 tablet 3    allopurinol (ZYLOPRIM) 100 MG tablet Take 1 tablet by mouth daily 90 tablet 1    topiramate (TOPAMAX) 50 MG tablet Take 1 tablet by mouth 2 times daily 60 tablet 3       Allergies   Allergen Reactions    Aspirin Nausea Only    Ativan [Lorazepam] Other (See Comments)     hallucinations    Diltiazem Anaphylaxis    Sulfa Antibiotics Rash and Hives    Dabigatran Nausea Only     And indigestion  And indigestion    Aka Pradaxa    Lipitor [Atorvastatin] Other (See Comments)     Muscle pains    Mysoline [Primidone]     Nsaids     Other Other (See Comments)     Nitroglycerin patches causes severe headaches       Social History     Tobacco Use    Smoking status: Former Smoker     Packs/day: 1.00     Years: 28.00     Pack years: 28.00     Types: Cigarettes     Last attempt to quit: 1987     Years since quittin.8    Smokeless tobacco: Never Used    Tobacco comment: H.O.smoking at age 15 / smoked up to 1 p.p.d / quit    Substance Use Topics    Alcohol use: Not Currently     Alcohol/week: 0.0 standard drinks       /62 (Site: Right Upper Arm, Position: Sitting, Cuff Size: Medium Adult)   Pulse 68   Temp 97 °F (36.1 °C) (Infrared)   Wt 128 lb (58.1 kg)   SpO2 96%   BMI 22.67 kg/m²         Objective:   Physical Exam  Vitals signs and nursing note reviewed. Constitutional:       General: She is not in acute distress. Appearance: She is well-developed. Cardiovascular:      Pulses:           Dorsalis pedis pulses are 2+ on the right side and 2+ on the left side. Posterior tibial pulses are 2+ on the right side and 2+ on the left side. Musculoskeletal:      Right hip: Normal.      Right knee: She exhibits normal range of motion, no LCL laxity, normal patellar mobility and no MCL laxity. Tenderness (pes bursa site) found. No medial joint line, no lateral joint line, no MCL, no LCL and no patellar tendon tenderness noted. Right lower leg: Normal.   Skin:     General: Skin is warm. Findings: Lesion (Outer aspect of the ear with an erythematous scaly lesion. It appears very superficial.) present. No rash. Comments: General alopecia noted of the scalp. Neurological:      Mental Status: She is alert.    Psychiatric: Behavior: Behavior is cooperative. Assessment:      Roberto Daniel was seen today for leg pain. Diagnoses and all orders for this visit:    Pes anserinus bursitis of right knee  -     DC ARTHROCENTESIS ASPIR&/INJ MAJOR JT/BURSA W/O US    Alopecia    Painful skin lesion    Acquired hypothyroidism  -     TSH without Reflex; Future    Idiopathic peripheral neuropathy    Other orders  -     vitamin D (ERGOCALCIFEROL) 1.25 MG (67804 UT) CAPS capsule; Take 1 capsule by mouth once a week  -     DULoxetine (CYMBALTA) 30 MG extended release capsule; Take 1 capsule by mouth daily  -     triamcinolone acetonide (KENALOG-40) injection 40 mg    Discontinue trazodone        Plan:      Patient would like to proceed with arthrocentesis of the right knee. Alopecia might be due to secondary to stress versus thyroid and will recheck thyroid function. In regards to the neuropathy we will go ahead and discontinue trazodone and start her on Cymbalta    Keep RTC appointment    Please note that this chart was generated using Dragon dictation software. Although every effort was made to ensure the accuracy of this automated transcription, some errors in transcription may have occurred.

## 2020-11-09 NOTE — PROGRESS NOTES
Medication given during visit:    Administrations This Visit     triamcinolone acetonide (KENALOG-40) injection 40 mg     Admin Date  11/09/2020  11:19 Action  Given Dose  40 mg Route  Intra-articular Site  Knee Right Administered By  Lety Hull MA    Ordering Provider:  Daxa Florence MD    NDC:  4834-0113-27    Lot#:  [id number]    :  [employer] (PRIMARY CARE)    Patient Supplied?:  No    Comments:  exp; 02/2022                Patient instructed to remain in clinic for 20 minutes after injection and was advised to report any adverse reaction to me immediately.

## 2020-11-09 NOTE — CARE COORDINATION
Initial 24 hr call attempted, contact info left on vm. Per [facility], Pt had an MD appt, this morning.

## 2020-11-10 ENCOUNTER — CARE COORDINATION (OUTPATIENT)
Dept: CASE MANAGEMENT | Age: 80
End: 2020-11-10

## 2020-11-10 LAB — VITAMIN D 25-HYDROXY: 70.9 NG/ML

## 2020-11-10 NOTE — CARE COORDINATION
[facility] Transitions Initial Follow Up Call    Call within 2 business days of discharge: Yes    Patient: Neelam Albert Patient : 1940   MRN: [id number]  Reason for Admission:   Discharge Date: 20 RARS: Readmission Risk Score: 16      Last Discharge [facility]       Complaint Diagnosis Description Type Department Provider    20 Fall Fall, initial encounter . .. [facility] MD; Zoe Lovett. .. Non-face-to-face services provided:  Obtained and reviewed discharge summary and/or continuity of care documents     Challenges to be reviewed by the provider   Additional needs identified to be addressed with provider No  none    Discussed COVID-19 related testing which was not done at this time. Test results were not done. Patient informed of results, if available? Not done         Method of communication with provider : none    Advance Care Planning:   Does patient have an Advance Directive:  reviewed and current. Was this a readmission? No  Patient stated reason for admission: fell  Patients top risk factors for readmission: medical condition    Care Transition Nurse (CTN) contacted the patient by telephone to perform post hospital discharge assessment. Verified name and  with patient as identifiers. Provided introduction to self, and explanation of the CTN role. CTN reviewed discharge instructions, medical action plan and red flags with patient who verbalized understanding. Patient given an opportunity to ask questions and does not have any further questions or concerns at this time. Were discharge instructions available to patient? Yes. Reviewed appropriate site of care based on symptoms and resources available to patient including: When to call 911. The patient agrees to contact the PCP office for questions related to their healthcare.      Medication reconciliation was performed with patient, who verbalizes understanding of administration of home medications. Advised obtaining a 90-day supply of all daily and as-needed medications. Covid Risk Education    Patient has following risk factors of: heart failure and chronic kidney disease. Education provided regarding infection prevention, and signs and symptoms of COVID-19 and when to seek medical attention with patient who verbalized understanding. Discussed exposure protocols and quarantine From CDC: Are you at higher risk for severe illness?   and given an opportunity for questions and concerns. The patient agrees to contact the COVID-19 hotline [phone] or PCP office for questions related to COVID-19. For more information on steps you can take to protect yourself, see CDC's How to Protect Yourself     Discussed follow-up appointments. If no appointment was previously scheduled, appointment scheduling offered: Yes. Is follow up appointment scheduled within 7 days of discharge? Yes  Non-[facility] follow up appointment(s): no    Plan for follow-up call in 5-7 days based on severity of symptoms and risk factors. Plan for next call: self management-CHF     Pt states doing well, no issues or concerns. Denies SOB, CP. Weight today was 125 lbs. She has not fallen again since she ha been home. Saw PCP yesterday, med changes noted (see above) Had injection in rt knee. She stated they reviewed all her meds when she was at the office yesterday. Next F/U appts listed below. Agreed to more CTC f/u calls. CTN provided contact information for future needs.       Care Transitions 24 Hour Call    Do you have any ongoing symptoms?:  No  Do you have a copy of your discharge instructions?:  Yes  Do you have all of your prescriptions and are they filled?:  Yes  Have you been contacted by a [facility] Pharmacist?:  No  Have you scheduled your follow up appointment?:  Yes  How are you going to get to your appointment?:  Car - family or friend to transport  Were you discharged with any Home Care or Post Acute Services:  No  Post Acute Services:  Home Health (Comment: [facility])  Patient DME:  Other  Other Patient DME:  grab bar in shower   Do you have support at home?:  Partner/Spouse/SO  Do you feel like you have everything you need to keep you well at home?:  Yes  Are you an active caregiver in your home?:  No  Care Transitions Interventions  No Identified Needs         Follow Up  Future Appointments   Date Time Provider Triston Ford   11/12/2020 10:00 AM [place] [address], [address], APRN - [place] CARDIO [facility]   11/16/2020 10:30 AM Teresa Gotti MD [facility]   12/8/2020  3:00 PM Tree Canela MD [facility]   1/25/2021  8:00 AM SCHEDULE, [address] FF Cardio [facility]       Anjum Calderon RN

## 2020-11-12 ENCOUNTER — OFFICE VISIT (OUTPATIENT)
Dept: CARDIOLOGY CLINIC | Age: 80
End: 2020-11-12
Payer: MEDICARE

## 2020-11-12 VITALS
TEMPERATURE: 97.2 F | OXYGEN SATURATION: 92 % | DIASTOLIC BLOOD PRESSURE: 60 MMHG | HEART RATE: 80 BPM | WEIGHT: 130 LBS | BODY MASS INDEX: 23.04 KG/M2 | HEIGHT: 63 IN | SYSTOLIC BLOOD PRESSURE: 140 MMHG

## 2020-11-12 LAB
IRON SATURATION: 25 % (ref 15–50)
IRON: 54 UG/DL (ref 37–145)
TOTAL IRON BINDING CAPACITY: 216 UG/DL (ref 260–445)

## 2020-11-12 PROCEDURE — 1111F DSCHRG MED/CURRENT MED MERGE: CPT | Performed by: CLINICAL NURSE SPECIALIST

## 2020-11-12 PROCEDURE — 4040F PNEUMOC VAC/ADMIN/RCVD: CPT | Performed by: CLINICAL NURSE SPECIALIST

## 2020-11-12 PROCEDURE — 99214 OFFICE O/P EST MOD 30 MIN: CPT | Performed by: CLINICAL NURSE SPECIALIST

## 2020-11-12 PROCEDURE — G8427 DOCREV CUR MEDS BY ELIG CLIN: HCPCS | Performed by: CLINICAL NURSE SPECIALIST

## 2020-11-12 PROCEDURE — G8420 CALC BMI NORM PARAMETERS: HCPCS | Performed by: CLINICAL NURSE SPECIALIST

## 2020-11-12 PROCEDURE — G8399 PT W/DXA RESULTS DOCUMENT: HCPCS | Performed by: CLINICAL NURSE SPECIALIST

## 2020-11-12 PROCEDURE — 1090F PRES/ABSN URINE INCON ASSESS: CPT | Performed by: CLINICAL NURSE SPECIALIST

## 2020-11-12 PROCEDURE — G8482 FLU IMMUNIZE ORDER/ADMIN: HCPCS | Performed by: CLINICAL NURSE SPECIALIST

## 2020-11-12 PROCEDURE — 1036F TOBACCO NON-USER: CPT | Performed by: CLINICAL NURSE SPECIALIST

## 2020-11-12 PROCEDURE — 1123F ACP DISCUSS/DSCN MKR DOCD: CPT | Performed by: CLINICAL NURSE SPECIALIST

## 2020-11-12 NOTE — PATIENT INSTRUCTIONS
1.  Will add iron studies to labs done earlier this week  2. Continue all current medications  3. Call Dr Ju Packer about your thyroid  4.   RTO in 2-3 months

## 2020-11-12 NOTE — PROGRESS NOTES
Arthrocentesis Procedure Note    Pre-operative Diagnosis:   1. Pes anserinus bursitis of right knee    2. Alopecia    3. Painful skin lesion    4. Acquired hypothyroidism    5. Idiopathic peripheral neuropathy        Post-operative Diagnosis: same    Locations:right knee    Procedure Details   After consent was obtained, using sterile technique the pes bursitis of   right knee was prepped and surrounding area with a sterile prep using chloraprep and draped in the usual sterile fashion. .  Kenalog  40 mg and 1 ml 0.5% Marcaine was then injected and the needle withdrawn. The procedure was well tolerated. Sterile dressing applied to site. Complications: none. Plan:  1. The patient was instructed to continue to rest the joint for a few more days before resuming regular activities. It may be more painful for the first 1-2 days. 2. Watch for fever, or increased swelling or persistent pain in the joint. 3. Call or return to clinic prn if such symptoms occur or there is failure to improve as anticipated. 4. Recommended that the patient use OTC analgesics as needed for pain.

## 2020-11-12 NOTE — PROGRESS NOTES
[facility]  Progress Note    Primary Care Doctor:  Mariama Franklin MD    Chief Complaint   Patient presents with   [address], not on heart logic    Congestive Heart Failure    Fatigue     more tired since being home from the hospital        History of Present Illness:  [de-identified] y.o. female with history of Ms. Eugenio Marroquin She has a history of atrial fibrillation(on xarelto), TIA, chronic kidney disease, CHF, hypertension, hyperlipidemia, mitral valve disease, hypothyroidism. Crystal Hernandez last LVEF was 45% on 7/19/19. CABG, 7/08 cath- patent LIMA to LAD, SVG occluded to RCA; 8/2019 left to right fem-fem bypass and left fem to above knee popliteal bypass    I had the pleasure of seeing Stephanie Torres in follow up for sHF. She is ambulatory and with her daughter today. She states that her dizziness is better. She was in the hospital 11/7 after a fall. She complains of being weak and tired. She only completed 3 of the 5 iron infusions. She feels that she did the best when she was on aldactone (stopped due to hyperkalemia). She alternates torsemide 10 and 20. Weight is up 2 pounds. She had seen Dr Laurent Moon in the past for her thyroid. She denies any chest pain, palpitations or edema.     Past Medical History:   has a past medical history of Allergic rhinitis, cause unspecified, Arthritis, Atrial fibrillation ([address] Bronchopneumonia, CAD (coronary artery disease), Cerebral artery occlusion with cerebral infarction ([address] Chronic gouty arthropathy, Chronic kidney disease, Congestive heart failure, unspecified, Degeneration of cervical intervertebral disc, Essential and other specified forms of tremor, Gout, HIGH CHOLESTEROL, History of CVA (cerebrovascular accident), Hx of blood clots, Hypertension, Influenza, Mitral valve stenosis and aortic valve insufficiency, Movement disorder, Pacemaker, Peptic ulcer, unspecified site, unspecified as acute or chronic, without mention of hemorrhage, perforation, or obstruction, Thyroid disease, Unspecified disorder of kidney and ureter, and Unspecified hypothyroidism. Surgical History:   has a past surgical history that includes back surgery; Cholecystectomy; Cardiac surgery; Coronary artery bypass graft (1987); shoulder surgery; Cardiac catheterization (7/2012); hip surgery (Left, 03/16/2017); joint replacement; Cardiac catheterization (08/07/2018); Percutaneous Transluminal Coronary Angio (11/04/2014); pr inject anes/steroid foramen lumbar/sacral w img guide ,1 level (Right, 12/3/2018); Femoral-femoral Bypass Graft (N/A, 8/22/2019); and femoral bypass (Left, 8/22/2019). Social History:   reports that she quit smoking about 33 years ago. Her smoking use included cigarettes. She has a 28.00 pack-year smoking history. She has never used smokeless tobacco. She reports previous alcohol use. She reports that she does not use drugs. Family History:   Family History   Problem Relation Age of Onset    Cancer Sister     Heart Disease Sister     Diabetes Brother     Hypertension Brother     Heart Disease Brother     Stroke Maternal Aunt     Heart Disease Maternal Aunt     Diabetes Maternal Uncle     Hypertension Paternal Aunt     Cancer Maternal Grandmother     Cancer Paternal Grandmother     Rheum Arthritis Neg Hx     Lupus Neg Hx        Home Medications:  Prior to Admission medications    Medication Sig Start Date End Date Taking?  Authorizing Provider   vitamin D (ERGOCALCIFEROL) 1.25 MG (80348 UT) CAPS capsule Take 1 capsule by mouth once a week 11/9/20  Yes Ania Robles MD   DULoxetine (CYMBALTA) 30 MG extended release capsule Take 1 capsule by mouth daily 11/9/20  Yes Ania Robles MD   metoprolol succinate (TOPROL XL) 25 MG extended release tablet Take 1 tablet by mouth 2 times daily 11/8/20  Yes TIFFANIE Martin - CNP   torsemide (DEMADEX) 10 MG tablet TAKE 1 TABLET EVERY OTHER DAY ALTERNATING WITH  2  TABLETS EVERY OTHER DAY 10/14/20  Yes Tory Olivas APRN - CNS   clopidogrel (PLAVIX) 75 MG tablet Take 1 tablet by mouth daily 10/6/20  Yes Margarita Johnson MD   levothyroxine (SYNTHROID) 112 MCG tablet TAKE 1 TABLET EVERY DAY 9/8/20  Yes Margarita Johnson MD   XARELTO 15 MG TABS tablet TAKE 1 TABLET DAILY WITH BREAKFAST 5/14/20  Yes Durenda Goltz, APRN - CNP   allopurinol (ZYLOPRIM) 100 MG tablet Take 1 tablet by mouth daily 4/23/20  Yes Margarita Johnson MD   topiramate (TOPAMAX) 50 MG tablet Take 1 tablet by mouth 2 times daily 3/28/20  Yes Yeyo Russo MD        Allergies:  Aspirin; Ativan [lorazepam]; Diltiazem; Sulfa antibiotics; Dabigatran; Lipitor [atorvastatin]; Mysoline [primidone]; Nsaids; and Other     Review of Systems:   · Constitutional: there has been no unanticipated weight loss. There's been no change in energy level, sleep pattern, or activity level. · Eyes: No visual changes or diplopia. No scleral icterus. · ENT: No Headaches, hearing loss or vertigo. No mouth sores or sore throat. · Cardiovascular: Reviewed in HPI  · Respiratory: No cough or wheezing, no sputum production. No hematemesis. · Gastrointestinal: No abdominal pain, appetite loss, blood in stools. No change in bowel or bladder habits. States she feels bloated   · Genitourinary: No dysuria, trouble voiding, or hematuria. · Musculoskeletal:  No gait disturbance, weakness or joint complaints. · Integumentary: No rash or pruritis. · Neurological: No headache, diplopia, change in muscle strength, numbness or tingling. No change in gait, balance, coordination, mood, affect, memory, mentation, behavior. · Psychiatric: No anxiety, no depression. · Endocrine: No malaise, fatigue or temperature intolerance. No excessive thirst, fluid intake, or urination. No tremor. · Hematologic/Lymphatic: No abnormal bruising or bleeding, blood clots or swollen lymph nodes. · Allergic/Immunologic: No nasal congestion or hives.     Physical Examination:    Vitals:    11/12/20 1334   BP: (!) 140/60   Site: Right Upper Arm   Position: Sitting   Cuff Size: Medium Adult   Pulse: 80   Temp: 97.2 °F (36.2 °C)   SpO2: 92%   Weight: 130 lb (59 kg)   Height: 5' 3\" (1.6 m)        Constitutional and General Appearance: Warm and dry, no apparent distress, normal coloration  HEENT:  Normocephalic, atraumatic  Respiratory:  · Normal excursion and expansion without use of accessory muscles  · Resp Auscultation: Normal breath sounds without dullness  Cardiovascular:  · The apical impulses not displaced  · Heart tones are crisp and normal  · JVP normal H2O  · Regular rate and irregular rhythm  · Peripheral pulses are symmetrical and full  · There is no clubbing, cyanosis of the extremities.   · No ankle edema  · Pedal Pulses: 2+ and equal   Abdomen:  · No masses or tenderness  · Liver/Spleen: No Abnormalities Noted  Neurological/Psychiatric:  · Alert and oriented in all spheres  · Moves all extremities well  · Exhibits normal gait balance and coordination  · No abnormalities of mood, affect, memory, mentation, or behavior are noted    Lab Data:    CBC:   Lab Results   Component Value Date    WBC 12.3 11/09/2020    WBC 10.2 11/07/2020    WBC 10.1 08/04/2020    RBC 5.30 11/09/2020    RBC 5.02 11/07/2020    RBC 5.12 08/04/2020    HGB 14.6 11/09/2020    HGB 14.2 11/07/2020    HGB 13.7 08/04/2020    HCT 46.5 11/09/2020    HCT 44.7 11/07/2020    HCT 43.7 08/04/2020    MCV 87.9 11/09/2020    MCV 89.1 11/07/2020    MCV 85.3 08/04/2020    RDW 20.8 11/09/2020    RDW 20.8 11/07/2020    RDW 21.0 08/04/2020     11/09/2020     11/07/2020     08/04/2020     BMP:  Lab Results   Component Value Date     11/09/2020     11/07/2020     08/04/2020    K 4.3 11/09/2020    K 4.2 11/07/2020    K 5.2 08/04/2020    K 4.2 07/01/2020    K 4.1 03/25/2020    K 5.6 02/07/2020     11/09/2020     11/07/2020     08/04/2020    CO2 26 11/09/2020 CO2 24 11/07/2020    CO2 22 08/04/2020    PHOS 3.1 11/09/2020    PHOS 3.9 08/04/2020    PHOS 3.0 07/01/2020    BUN 34 11/09/2020    BUN 38 11/07/2020    BUN 32 08/04/2020    CREATININE 1.5 11/09/2020    CREATININE 1.5 11/07/2020    CREATININE 1.3 08/04/2020     BNP:   Lab Results   Component Value Date    PROBNP 2,170 11/07/2020    PROBNP 2,375 08/04/2020    PROBNP 2,255 04/21/2020     Cardiac Imaging:  Echo 4/30/2020   Left ventricular cavity size is normal. Normal left ventricular wall   thickness. Left ventricular function appears to be reduced with an estimated   ejection fraction of 45%. Diffuse hypokinesis. Echo 7/19/2019   Summary    Left ventricle - normal size, thickness, reduced function with EF of 45%  LV wall motion - diffuse hypokinesis. Mitral valve - thickened, annular calcification, moderate regurgitation  Aortic valve - thickened, calcified, mild regurgitation  Tricuspid valve - mild regurgitation with PASP of 25mmHg  Pulmonic valve - trivial regurgitation   Biatrial enlargement  Pacemaker / ICD lead is visualized in the right heart. 6/2019  NSTEMI: . Angiogram findings were as below:      Findings:                      LM       Normal              LAD     50% ostial, mid 100%              Cx        40% OM1 at bifurcation              RCA     Mid 100%              LVG     Not performed              EDP     15 mmHg              L-LAD  Patent              S-PDA Known occluded  Severe Ca++:None  Post Cath Dx:Stable CAD, no apparent culprit for NSTEMI  Intervention:  None  Med Rec:        Continue aggressive med tx                          Continue plavix, no asa due to allergy. statin added. LDL 75   8/7/18  The PCI of complex proximal RCA chronic total occlusion was unsuccessful (J- score 3). Underwent successful Angioplasty of high-grade proximal RCA lesion proximal to the . RECOMMENDATIONS:  Attempt on this complex long  was unsuccessful.  Lack   Of retrograde collaterals along with a very ambiguous cap as well as a large RV marginal branch and bridging collateral coming off at the cap makes it a  challenging repeat attempt. If she continues to have angina, it is recommended to proceed with the single-vessel SVG to the RCA. Echo: 2/17/16 ([facility])  Conclusions: Normal LV size and systolic function Mild to moderate mitral  regurgitation Mild tricuspid regurgitation  Findings:   Left [facility]: Mild to moderate enlargement of the left atrium. Left Ventricle: Upper limits of normal left ventricle size. No left ventricular  hypertrophy. Normal left ventricular systolic function. The ejection fraction   Is visually estimated to be 55 %. Right [facility]: Normal right atrial size. RightVentricle: Normal right ventricle size. Normal right ventricular function. Aortic Valve: Tri-leaflet aortic valve. Mild aortic cusp calcification. No  aortic valve stenosis. Mild (1+) aortic valve regurgitation. Aorta: No dilatation of the aortic root. IVC/PA: Normal IVC size and normal respiratory  collapse consistent with normal right atrial pressure. Mitral Valve: Mild mitral valve leaflet calcification. Mild to moderate (2+) mitral valve  regurgitation. No mitral valve stenosis. Tricuspid Valve: Mild (1+) tricuspid  regurgitation. The pulmonary artery pressure is normal.   Pulmonic Valve: Mild  pulmonic regurgitation. Pericardium: Normal pericardium with no significant  pericardial effusion. Assessment:    1. Chronic systolic heart failure (HCC) on bb, no ace/arb due to dizziness; no aldosterone antagonist due to hyperkalemia   2. Chronic atrial fibrillation on xarelto   3. Coronary artery disease involving autologous artery coronary bypass graft without angina pectoris    4. Renal insufficiency follows with Dr Julio Cesar Grossman  5. Iron deficiency anemia    Plan:   1. Will add iron studies to labs done earlier this week  2. Continue all current medications  3.   Call Dr Barrington Fox about your thyroid  4. RTO in 2-3 months    Will ask Dr Светлана Stone if we can do aldactone twice a week    I appreciate the opportunity of cooperating in the care of this individual.    WINSTON Portillo, 11/12/2020, 1:45 PM    QUALITY MEASURES  1. Tobacco Cessation Counseling: NA  2. Retake of BP if >140/90:   NA  3. Documentation to PCP/referring for new patient:  Sent to PCP at close of office visit  4. CAD patient on anti-platelet: Yes  5. CAD patient on STATIN therapy:  No did not tolerate  6.  Patient with CHF and aFib on anticoagulation:  Yes

## 2020-11-12 NOTE — Clinical Note
Dr Laurie Cruz Citizen see her next week  Let me know what you think about adding aldactone 12.5 mg twice a week (she had some hyperkalemia on higher doses)  Libertad Spears

## 2020-11-13 RX ORDER — SPIRONOLACTONE 25 MG/1
12.5 TABLET ORAL
Qty: 30 TABLET | Refills: 0 | Status: SHIPPED | OUTPATIENT
Start: 2020-11-13 | End: 2021-03-09 | Stop reason: ALTCHOICE

## 2020-11-13 RX ORDER — SPIRONOLACTONE 25 MG/1
12.5 TABLET ORAL
Qty: 30 TABLET | Refills: 0
Start: 2020-11-13 | End: 2020-11-13 | Stop reason: SDUPTHER

## 2020-11-13 NOTE — TELEPHONE ENCOUNTER
Spoke to patient about the message below she verbalized understanding. Below the medication pended she stated that she is out of that medication completley. The medication don't look like it was sent to the pharmacy.

## 2020-11-16 ENCOUNTER — TELEPHONE (OUTPATIENT)
Dept: FAMILY MEDICINE CLINIC | Age: 80
End: 2020-11-16

## 2020-11-17 ENCOUNTER — CARE COORDINATION (OUTPATIENT)
Dept: CASE MANAGEMENT | Age: 80
End: 2020-11-17

## 2020-11-17 NOTE — CARE COORDINATION
[facility] Follow Up Call    2020    Patient: Ghazal Ramirez  Patient : 1940   MRN: [id number]  Reason for Admission:   Discharge Date: 20 RARS: Readmission Risk Score: 16       Follow up call attempted, left contact info on vm        Follow Up  Future Appointments   Date Time Provider Triston Ford   2020  3:00 PM Jonathan Poole MD [facility]   2021 10:45 AM Jose Khalil MD [facility]   2021  8:00 AM SCHEDULE, [place] [facility]OTE TRANSMISSION FF Cardio FRANDY Romero RN

## 2020-11-18 ENCOUNTER — TELEPHONE (OUTPATIENT)
Dept: CARDIOLOGY CLINIC | Age: 80
End: 2020-11-18

## 2020-11-18 NOTE — TELEPHONE ENCOUNTER
Pt went to pharmacy to get her rx spironolactone, she stated she was suppose to take take M-W-F however the RX on the bottle states take TID. She is confused how to take it now.     pls advise thank you

## 2020-11-18 NOTE — TELEPHONE ENCOUNTER
Clarified questions in regards to prescription . Daughter voiced understanding.  Call complete  Per NPRG :     I spoke with Dr GEORGE (nephrology) and ok to start low dose of aldactone 12.5 mg (half of a 25 mg pill) on mon wed and fri

## 2020-11-19 ENCOUNTER — CARE COORDINATION (OUTPATIENT)
Dept: CASE MANAGEMENT | Age: 80
End: 2020-11-19

## 2020-11-19 NOTE — CARE COORDINATION
[facility] Follow Up Call    2020    Patient: Karoline Garcia  Patient : 1940   MRN: [id number]  Reason for Admission:   Discharge Date: 20 RARS: Readmission Risk Score: 16         2nd attempt at a Follow up call, left contact info on vm        Follow Up  Future Appointments   Date Time Provider Triston Ford   2020  3:00 PM Faith Larkin MD [facility]   2021 10:45 AM Geoff Mccormick MD [facility]   2021  8:00 AM SCHEDULE, [place] REMOTE TRANSMISSION FF Cardio [facility]       Brigid Ledezma RN

## 2020-11-23 ENCOUNTER — CARE COORDINATION (OUTPATIENT)
Dept: CASE MANAGEMENT | Age: 80
End: 2020-11-23

## 2020-11-23 NOTE — CARE COORDINATION
[facility] Follow Up Call    2020    Patient: Lia Jimenez  Patient : 1940   MRN: [id number]  Reason for Admission:   Discharge Date: 20 RARS: Readmission Risk Score: 16         3rd and final attempt at a f/u call, contact info left on vm      Follow Up  Future Appointments   Date Time Provider Triston Ford   2020  3:00 PM Ania Robles MD [facility]   2021 10:45 AM Al Hernnadez MD [facility]   2021  8:00 AM SCHEDULE, [facility] TRANSMISSION FF Cardio [facility]       Ben John RN

## 2020-12-03 RX ORDER — CLOPIDOGREL BISULFATE 75 MG/1
TABLET ORAL
Qty: 90 TABLET | Refills: 1 | Status: SHIPPED | OUTPATIENT
Start: 2020-12-03 | End: 2020-12-21

## 2020-12-04 RX ORDER — ALLOPURINOL 100 MG/1
100 TABLET ORAL DAILY
Qty: 90 TABLET | Refills: 1 | Status: SHIPPED | OUTPATIENT
Start: 2020-12-04 | End: 2021-03-15 | Stop reason: SDUPTHER

## 2020-12-04 NOTE — TELEPHONE ENCOUNTER
----- Message from Mario Heber sent at 12/4/2020  3:32 PM EST -----  Subject: Refill Request    QUESTIONS  Name of Medication? allopurinol (ZYLOPRIM) 100 MG tablet  Patient-reported dosage and instructions? 1 a day  How many days do you have left? 5  Preferred Pharmacy? [address] [facility] - [address] [facility] [address] [place] [address] [phone] [facility] [phone]  Pharmacy phone number (if available)? [phone]  Additional Information for Provider?   ---------------------------------------------------------------------------  --------------  [address]  What is the best way for the office to contact you? OK to leave message on   voicemail  Preferred Call Back Phone Number?  [phone]

## 2020-12-08 ENCOUNTER — OFFICE VISIT (OUTPATIENT)
Dept: FAMILY MEDICINE CLINIC | Age: 80
End: 2020-12-08
Payer: MEDICARE

## 2020-12-08 VITALS
OXYGEN SATURATION: 97 % | DIASTOLIC BLOOD PRESSURE: 70 MMHG | BODY MASS INDEX: 22.59 KG/M2 | RESPIRATION RATE: 12 BRPM | HEART RATE: 69 BPM | WEIGHT: 127.5 LBS | SYSTOLIC BLOOD PRESSURE: 100 MMHG | TEMPERATURE: 97.9 F

## 2020-12-08 PROCEDURE — G8399 PT W/DXA RESULTS DOCUMENT: HCPCS | Performed by: FAMILY MEDICINE

## 2020-12-08 PROCEDURE — 1111F DSCHRG MED/CURRENT MED MERGE: CPT | Performed by: FAMILY MEDICINE

## 2020-12-08 PROCEDURE — 99214 OFFICE O/P EST MOD 30 MIN: CPT | Performed by: FAMILY MEDICINE

## 2020-12-08 PROCEDURE — G8482 FLU IMMUNIZE ORDER/ADMIN: HCPCS | Performed by: FAMILY MEDICINE

## 2020-12-08 PROCEDURE — 1036F TOBACCO NON-USER: CPT | Performed by: FAMILY MEDICINE

## 2020-12-08 PROCEDURE — 1090F PRES/ABSN URINE INCON ASSESS: CPT | Performed by: FAMILY MEDICINE

## 2020-12-08 PROCEDURE — 1123F ACP DISCUSS/DSCN MKR DOCD: CPT | Performed by: FAMILY MEDICINE

## 2020-12-08 PROCEDURE — 4040F PNEUMOC VAC/ADMIN/RCVD: CPT | Performed by: FAMILY MEDICINE

## 2020-12-08 PROCEDURE — G8427 DOCREV CUR MEDS BY ELIG CLIN: HCPCS | Performed by: FAMILY MEDICINE

## 2020-12-08 PROCEDURE — G8420 CALC BMI NORM PARAMETERS: HCPCS | Performed by: FAMILY MEDICINE

## 2020-12-08 RX ORDER — HYDROCODONE BITARTRATE AND ACETAMINOPHEN 5; 325 MG/1; MG/1
1 TABLET ORAL 4 TIMES DAILY PRN
Qty: 120 TABLET | Refills: 0 | Status: CANCELLED | OUTPATIENT
Start: 2020-12-08 | End: 2021-01-07

## 2020-12-08 RX ORDER — TOLTERODINE 2 MG/1
2 CAPSULE, EXTENDED RELEASE ORAL DAILY
Qty: 30 CAPSULE | Refills: 5 | Status: SHIPPED | OUTPATIENT
Start: 2020-12-08 | End: 2020-12-21 | Stop reason: SDUPTHER

## 2020-12-08 RX ORDER — TOLTERODINE 2 MG/1
2 CAPSULE, EXTENDED RELEASE ORAL DAILY
COMMUNITY
End: 2020-12-08 | Stop reason: SDUPTHER

## 2020-12-08 RX ORDER — DULOXETIN HYDROCHLORIDE 60 MG/1
60 CAPSULE, DELAYED RELEASE ORAL DAILY
Qty: 90 CAPSULE | Refills: 1 | Status: SHIPPED | OUTPATIENT
Start: 2020-12-08 | End: 2021-05-26 | Stop reason: ALTCHOICE

## 2020-12-08 NOTE — PROGRESS NOTES
Subjective:      Patient ID: Chemo Cohen is a [de-identified] y.o. female. CC: Patient presents for re-evaluation of chronic health problems including generalized weakness, feet neuropathy, chronic renal failure, chronic atrial fibrillation, osteoarthritis and tremor. Siva Duarte HPI Pt is here for a follow up with her daughter, med refill, and will like to discuss possible med increase to help with her right foot burning pain. Patient feels she is doing so much better as the Aldactone medication was decreased down to half a tablet 3 times a week. She no longer feels dizzy and she feels much stronger. She has had no falls since last appointment time. Her mental confusion seems to be significantly improved. She is having increasing discomfort in her legs and feet but she feels is better than it was prior to starting Cymbalta medication. She did go off the trazodone medication but then was having difficulty falling asleep so has resumed the trazodone medication. She also restarted Detrol medication for bladder urgency and request to refill that medication as well. Knee pain after arthrocentesis has significantly improved.     Review of Systems  Patient Active Problem List   Diagnosis    Chronic atrial fibrillation    Mixed hyperlipidemia    Chronic gouty arthropathy    Acquired hypothyroidism    Arthritis    Non-rheumatic mitral regurgitation    Restrictive lung disease    Sick sinus syndrome (HCC)    Allergic rhinitis    Fibrocystic breast    C[facility]brovascular accident (CVA) ([facility]    HTN (hypertension), benign    Pacemaker    Primary osteoarthritis involving multiple joints    Vitamin D deficiency    Tremor    Chronic total occlusion of native coronary artery    Age related osteoporosis    Acute on chronic systolic (congestive) heart failure (HCC)    Chronic renal failure, stage 3 (moderate)    [facility]AD [facility]peripheral artery disease) ([facility])    Atherosclerosis of native arteries of extremities with intermittent Muscle pains    Mysoline [Primidone]     Nsaids     Other Other (See Comments)     Nitroglycerin patches causes severe headaches       Social History     Tobacco Use    Smoking status: Former Smoker     Packs/day: 1.00     Years: 28.00     Pack years: 28.00     Types: Cigarettes     Last attempt to quit: 1987     Years since quittin.9    Smokeless tobacco: Never Used    Tobacco comment: H.O.smoking at age 15 / smoked up to 1 p.p.d / quit    Substance Use Topics    Alcohol use: Not Currently     Alcohol/week: 0.0 standard drinks       /70 (Site: Right Upper Arm, Position: Sitting, Cuff Size: Medium Adult)   Pulse 69   Temp 97.9 °F (36.6 °C) (Tympanic)   Resp 12   Wt 127 lb 8 oz (57.8 kg)   SpO2 97%   BMI 22.59 kg/m²     Objective:   Physical Exam  Vitals signs and nursing note reviewed. Constitutional:       General: She is not in acute distress. Appearance: Normal appearance. She is well-developed and well-groomed. Neck:      Vascular: No carotid bruit. Cardiovascular:      Rate and Rhythm: Normal rate. Rhythm irregularly irregular. Pulses:           Dorsalis pedis pulses are 1+ on the right side and 1+ on the left side. Posterior tibial pulses are 1+ on the right side and 1+ on the left side. Heart sounds: Murmur present. Systolic (Right sternal border) murmur present with a grade of 2/6. No friction rub. No gallop. Pulmonary:      Effort: Pulmonary effort is normal.      Breath sounds: Normal breath sounds. Musculoskeletal: Normal range of motion. Right knee: She exhibits normal range of motion. No tenderness found. Right lower leg: No edema. Left lower leg: No edema. Right foot: Normal.      Left foot: Normal.   Neurological:      Mental Status: She is alert. Sensory: Sensory deficit present. Motor: Motor function is intact. Deep Tendon Reflexes: Reflexes are normal and symmetric.       Comments: 12 point

## 2020-12-16 ENCOUNTER — APPOINTMENT (OUTPATIENT)
Dept: CT IMAGING | Age: 80
End: 2020-12-16
Payer: MEDICARE

## 2020-12-16 ENCOUNTER — HOSPITAL ENCOUNTER (EMERGENCY)
Age: 80
Discharge: ANOTHER HOSPITAL | End: 2020-12-16
Attending: EMERGENCY MEDICINE
Payer: MEDICARE

## 2020-12-16 VITALS
SYSTOLIC BLOOD PRESSURE: 137 MMHG | OXYGEN SATURATION: 97 % | HEART RATE: 80 BPM | DIASTOLIC BLOOD PRESSURE: 53 MMHG | TEMPERATURE: 97.3 F | BODY MASS INDEX: 23.04 KG/M2 | WEIGHT: 130 LBS | RESPIRATION RATE: 20 BRPM | HEIGHT: 63 IN

## 2020-12-16 PROBLEM — R93.0 ABNORMAL CT OF THE HEAD: Status: RESOLVED | Noted: 2020-11-08 | Resolved: 2020-12-16
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
## 2020-12-16 LAB
ABO/RH: NORMAL
ACT TEG: 89 SECONDS (ref 86–118)
ANGLE (CLOT STRENGTH): 81.6 DEGREES (ref 64–80)
ANION GAP SERPL CALCULATED.3IONS-SCNC: 10 MMOL/L (ref 3–16)
ANTIBODY SCREEN: NORMAL
BASOPHILS ABSOLUTE: 0.1 K/UL (ref 0–0.2)
BASOPHILS RELATIVE PERCENT: 1.3 %
BUN BLDV-MCNC: 43 MG/DL (ref 7–20)
CALCIUM SERPL-MCNC: 9 MG/DL (ref 8.3–10.6)
CHLORIDE BLD-SCNC: 111 MMOL/L (ref 99–110)
CO2: 21 MMOL/L (ref 21–32)
CREAT SERPL-MCNC: 1.4 MG/DL (ref 0.6–1.2)
EMPLOYED IN HEALTHCARE: NO
EOSINOPHILS ABSOLUTE: 0.4 K/UL (ref 0–0.6)
EOSINOPHILS RELATIVE PERCENT: 3.9 %
FIRST TEST: YES
GFR AFRICAN AMERICAN: 44
GFR NON-AFRICAN AMERICAN: 36
GLUCOSE BLD-MCNC: 114 MG/DL (ref 70–99)
HCT VFR BLD CALC: 41.1 % (ref 36–48)
HCT VFR BLD CALC: 44.2 % (ref 36–48)
HEMOGLOBIN: 13.1 G/DL (ref 12–16)
HEMOGLOBIN: 14.2 G/DL (ref 12–16)
HOSPITALIZED: NO
ICU: NO
LYMPHOCYTES ABSOLUTE: 1.2 K/UL (ref 1–5.1)
LYMPHOCYTES RELATIVE PERCENT: 13.1 %
Lab: 0 %
Lab: 25 SECONDS (ref 22–44)
Lab: 50 SECONDS (ref 34–138)
MAXIMUM AMPLITUDE: 72.5 MM (ref 52–71)
MCH RBC QN AUTO: 28.4 PG (ref 26–34)
MCH RBC QN AUTO: 28.5 PG (ref 26–34)
MCHC RBC AUTO-ENTMCNC: 31.9 G/DL (ref 31–36)
MCHC RBC AUTO-ENTMCNC: 32.1 G/DL (ref 31–36)
MCV RBC AUTO: 88.5 FL (ref 80–100)
MCV RBC AUTO: 89.2 FL (ref 80–100)
MONOCYTES ABSOLUTE: 0.7 K/UL (ref 0–1.3)
MONOCYTES RELATIVE PERCENT: 7.2 %
NEUTROPHILS ABSOLUTE: 6.9 K/UL (ref 1.7–7.7)
NEUTROPHILS RELATIVE PERCENT: 74.5 %
PDW BLD-RTO: 19.5 % (ref 12.4–15.4)
PDW BLD-RTO: 19.9 % (ref 12.4–15.4)
PLATELET # BLD: 282 K/UL (ref 135–450)
PLATELET # BLD: 341 K/UL (ref 135–450)
PMV BLD AUTO: 8.7 FL (ref 5–10.5)
PMV BLD AUTO: 9 FL (ref 5–10.5)
POTASSIUM REFLEX MAGNESIUM: 4.9 MMOL/L (ref 3.5–5.1)
PREGNANT ?: NO
RBC # BLD: 4.61 M/UL (ref 4–5.2)
RBC # BLD: 4.99 M/UL (ref 4–5.2)
RESIDES IN CONGREGATE CARE SETTING: NO
SARS-COV-2: NOT DETECTED
SODIUM BLD-SCNC: 142 MMOL/L (ref 136–145)
SYMPTOMATIC AS DEFINED BY THE CDC: NO
TEST ORDERED: NORMAL
UDI: NORMAL
WBC # BLD: 8.6 K/UL (ref 4–11)
WBC # BLD: 9.3 K/UL (ref 4–11)

## 2020-12-16 PROCEDURE — 99283 EMERGENCY DEPT VISIT LOW MDM: CPT

## 2020-12-16 PROCEDURE — 6360000002 HC RX W HCPCS: Performed by: STUDENT IN AN ORGANIZED HEALTH CARE EDUCATION/TRAINING PROGRAM

## 2020-12-16 PROCEDURE — 36415 COLL VENOUS BLD VENIPUNCTURE: CPT

## 2020-12-16 PROCEDURE — 3209999900 CT LUMBAR SPINE TRAUMA RECONSTRUCTION

## 2020-12-16 PROCEDURE — 71250 CT THORAX DX C-: CPT

## 2020-12-16 PROCEDURE — 86901 BLOOD TYPING SEROLOGIC RH(D): CPT

## 2020-12-16 PROCEDURE — 85027 COMPLETE CBC AUTOMATED: CPT

## 2020-12-16 PROCEDURE — 96375 TX/PRO/DX INJ NEW DRUG ADDON: CPT

## 2020-12-16 PROCEDURE — 2580000003 HC RX 258: Performed by: EMERGENCY MEDICINE

## 2020-12-16 PROCEDURE — 86900 BLOOD TYPING SEROLOGIC ABO: CPT

## 2020-12-16 PROCEDURE — 80048 BASIC METABOLIC PNL TOTAL CA: CPT

## 2020-12-16 PROCEDURE — 70450 CT HEAD/BRAIN W/O DYE: CPT

## 2020-12-16 PROCEDURE — 74177 CT ABD & PELVIS W/CONTRAST: CPT

## 2020-12-16 PROCEDURE — 96374 THER/PROPH/DIAG INJ IV PUSH: CPT

## 2020-12-16 PROCEDURE — 6360000002 HC RX W HCPCS: Performed by: EMERGENCY MEDICINE

## 2020-12-16 PROCEDURE — 86850 RBC ANTIBODY SCREEN: CPT

## 2020-12-16 PROCEDURE — 3209999900 CT THORACIC SPINE TRAUMA RECONSTRUCTION

## 2020-12-16 PROCEDURE — 72125 CT NECK SPINE W/O DYE: CPT

## 2020-12-16 PROCEDURE — 85025 COMPLETE CBC W/AUTO DIFF WBC: CPT

## 2020-12-16 PROCEDURE — C9132 KCENTRA, PER I.U.: HCPCS | Performed by: EMERGENCY MEDICINE

## 2020-12-16 PROCEDURE — 6360000004 HC RX CONTRAST MEDICATION: Performed by: EMERGENCY MEDICINE

## 2020-12-16 PROCEDURE — 96376 TX/PRO/DX INJ SAME DRUG ADON: CPT

## 2020-12-16 PROCEDURE — 74176 CT ABD & PELVIS W/O CONTRAST: CPT

## 2020-12-16 RX ORDER — FENTANYL CITRATE 50 UG/ML
50 INJECTION, SOLUTION INTRAMUSCULAR; INTRAVENOUS ONCE
Status: COMPLETED | OUTPATIENT
Start: 2020-12-16 | End: 2020-12-16

## 2020-12-16 RX ORDER — 0.9 % SODIUM CHLORIDE 0.9 %
500 INTRAVENOUS SOLUTION INTRAVENOUS ONCE
Status: COMPLETED | OUTPATIENT
Start: 2020-12-16 | End: 2020-12-16

## 2020-12-16 RX ORDER — SODIUM CHLORIDE 9 MG/ML
50 INJECTION, SOLUTION INTRAVENOUS ONCE
Status: COMPLETED | OUTPATIENT
Start: 2020-12-16 | End: 2020-12-16

## 2020-12-16 RX ADMIN — IOPAMIDOL 75 ML: 755 INJECTION, SOLUTION INTRAVENOUS at 05:20

## 2020-12-16 RX ADMIN — SODIUM CHLORIDE 500 ML: 9 INJECTION, SOLUTION INTRAVENOUS at 05:45

## 2020-12-16 RX ADMIN — FENTANYL CITRATE 50 MCG: 50 INJECTION, SOLUTION INTRAMUSCULAR; INTRAVENOUS at 07:28

## 2020-12-16 RX ADMIN — PROTHROMBIN, COAGULATION FACTOR VII HUMAN, COAGULATION FACTOR IX HUMAN, COAGULATION FACTOR X HUMAN, PROTEIN C, PROTEIN S HUMAN, AND WATER 2950 UNITS: KIT at 06:35

## 2020-12-16 RX ADMIN — SODIUM CHLORIDE 50 ML: 9 INJECTION, SOLUTION INTRAVENOUS at 07:29

## 2020-12-16 RX ADMIN — FENTANYL CITRATE 50 MCG: 50 INJECTION, SOLUTION INTRAMUSCULAR; INTRAVENOUS at 05:00

## 2020-12-16 ASSESSMENT — PAIN DESCRIPTION - LOCATION: LOCATION: FLANK

## 2020-12-16 ASSESSMENT — PAIN SCALES - GENERAL
PAINLEVEL_OUTOF10: 10
PAINLEVEL_OUTOF10: 10
PAINLEVEL_OUTOF10: 5

## 2020-12-16 ASSESSMENT — PAIN DESCRIPTION - PAIN TYPE: TYPE: ACUTE PAIN

## 2020-12-16 ASSESSMENT — PAIN DESCRIPTION - DESCRIPTORS: DESCRIPTORS: SHARP;ACHING

## 2020-12-16 ASSESSMENT — PAIN DESCRIPTION - ORIENTATION: ORIENTATION: RIGHT

## 2020-12-16 NOTE — ED PROVIDER NOTES
[address] PROVIDER NOTE    Patient Identification  Pt Name: Jose Benton  MRN: [id number]  Angel 1940  Date of evaluation: 12/16/2020  Provider: Porsha Kaiser MD  PCP: Ivonne López MD    Chief Complaint  Fall (pt was lifting dog into bed and dog was too heavy. Pt fell backwards and hit right back on a chest- occurred 1 hr ago. Pt is on blood thinners. Denies any dizziness)      HPI  History provided by patient   This is a [de-identified] y.o. female who presents to the ED for fall. Occurred about 1 hour ago. Was trying to lift her dog who was too heavy and she fell backwards. Endorses pain in her right flank. Able to ambulate without issue. Does not feel she needs to be hospitalized today. Is able to take care of activities of daily living. No loss of consciousness or dizziness. No chest pain or shortness of breath. No nausea or vomiting. ROS  10 systems reviewed, pertinent positives/negatives per HPI otherwise noted to be negative.     I have reviewed the following nursing documentation:  Allergies: Aspirin, Ativan [lorazepam], Diltiazem, Sulfa antibiotics, Dabigatran, Lipitor [atorvastatin], Mysoline [primidone], Nsaids, and Other    Past medical history:   Past Medical History:   Diagnosis Date    Allergic rhinitis, cause unspecified     Arthritis     Atrial fibrillation ([address]     Bronchopneumonia     CAD (coronary artery disease)     stent:  post cataract surgery (CABG)    Cerebral artery occlusion with cerebral infarction ([address]     TIA    Chronic gouty arthropathy     Chronic kidney disease     Congestive heart failure, unspecified     Degeneration of cervical intervertebral disc     Essential and other specified forms of tremor     Gout     HIGH CHOLESTEROL     History of CVA (cerebrovascular accident)     Hx of blood clots     Hypertension     Influenza 12/23/2017    Mitral valve stenosis and aortic valve insufficiency     Movement disorder     back problems    Pacemaker     Peptic ulcer, unspecified site, unspecified as acute or chronic, without mention of hemorrhage, perforation, or obstruction     Thyroid disease     Unspecified disorder of kidney and ureter     Unspecified hypothyroidism      Past surgical history:   Past Surgical History:   Procedure Laterality Date    BACK SURGERY      CARDIAC CATHETERIZATION  7/2012    CARDIAC CATHETERIZATION  08/07/2018    Unsuccesful  of RCA    CARDIAC SURGERY      CABG & Cardiac ablation    CHOLECYSTECTOMY      CORONARY ARTERY BYPASS GRAFT  1987    LIMA- Diag/LAD, SVG- RCA    FEMORAL BYPASS Left 8/22/2019    LEFT FEMORAL TO POPLITEAL BYPASS GRAFT performed by Nadya Mercado MD at [address] [facility] [address] FEMORAL-FEMORAL BYPASS GRAFT N/A 8/22/2019    FEMORAL TO FEMORAL BYPASS performed by Nadya Mercado MD at [address] [address] Left 03/16/2017    Left hip pinning    JOINT REPLACEMENT      IL INJECT ANES/STEROID FORAMEN LUMBAR/SACRAL W IMG GUIDE ,1 LEVEL Right 12/3/2018    RIGHT L3 AND L4 LUMBAR TRANSFORAMINAL EPIRUAL STEROID INJECTION WITH FLUOROSCOPY performed by Jessika Lopez MD at [address] [facility]  11/04/2014    MARLENY - 3.0 x 28 to the I[facility]      left       Home medications:   Previous Medications    ALLOPURINOL (ZYLOPRIM) 100 MG TABLET    Take 1 tablet by mouth daily    CLOPIDOGREL (PLAVIX) 75 MG TABLET    TAKE 1 TABLET EVERY DAY    DULOXETINE (CYMBALTA) 60 MG EXTENDED RELEASE CAPSULE    Take 1 capsule by mouth daily    LEVOTHYROXINE (SYNTHROID) 112 MCG TABLET    TAKE 1 TABLET EVERY DAY    METOPROLOL SUCCINATE (TOPROL XL) 25 MG EXTENDED RELEASE TABLET    Take 1 tablet by mouth 2 times daily    SPIRONOLACTONE (ALDACTONE) 25 MG TABLET    Take 0.5 tablets by mouth three times a week    TOLTERODINE (DETROL LA) 2 MG EXTENDED RELEASE CAPSULE    Take 1 capsule by mouth daily    TOPIRAMATE (TOPAMAX) 50 MG TABLET    Take 1 tablet by mouth 2 times daily TORSEMIDE (DEMADEX) 10 MG TABLET    TAKE 1 TABLET EVERY OTHER DAY ALTERNATING WITH  2  TABLETS EVERY OTHER DAY    VITAMIN D (ERGOCALCIFEROL) 1.25 MG (06425 UT) CAPS CAPSULE    Take 1 capsule by mouth once a week    XARELTO 15 MG TABS TABLET    TAKE 1 TABLET DAILY WITH BREAKFAST       Social history:  reports that she quit smoking about 33 years ago. Her smoking use included cigarettes. She has a 28.00 pack-year smoking history. She has never used smokeless tobacco. She reports previous alcohol use. She reports that she does not use drugs. Family history:    Family History   Problem Relation Age of Onset    Cancer Sister     Heart Disease Sister     Diabetes Brother     Hypertension Brother     Heart Disease Brother     Stroke Maternal Aunt     Heart Disease Maternal Aunt     Diabetes Maternal Uncle     Hypertension Paternal Aunt     Cancer Maternal Grandmother     Cancer Paternal Grandmother     Rheum Arthritis Neg Hx     Lupus Neg Hx          Exam  ED Triage Vitals [12/16/20 0201]   BP Temp Temp src Pulse Resp SpO2 Height Weight   128/69 97.3 °F (36.3 °C) -- 72 20 96 % 5' 3\" (1.6 m) 130 lb (59 kg)     Nursing note and vitals reviewed. Constitutional: In no acute distress  HENT:      Head: Normocephalic      Ears: External ears normal.      Nose: Nose normal.     Mouth: Membrane mucosa moist   Eyes: No discharge. Neck: Supple. Trachea midline. Cardiovascular: Regular rate. Warm extremities  Pulmonary/Chest: Effort normal. No respiratory distress. No wheezes. Abdominal: Soft. No distension. Nontender  : Deferred  Rectal: Deferred   Musculoskeletal: Moves all extremities. No gross deformity. No midline tenderness along spine. No tenderness in the arms, legs, hips, head  Neurological: Alert and oriented. Face symmetric. Speech is clear. Skin: Warm and dry. Bruising along right flank  Psychiatric: Normal mood and affect.  Behavior is normal.    Procedures        Radiology  CT ABDOMEN PELVIS W IV CONTRAST Additional Contrast? None   Final Result   Active extravasation, developing hematoma in subcutaneous tissues of the   right lower back overlying right 12th rib. Benign cystic structure of left kidney as described. CT LUMBAR SPINE TRAUMA RECONSTRUCTION   Final Result   1. No acute abnormality of the thoracic or lumbar spine. 2. No significant thoracic or lumbar spine spondylosis. 3. L3/L4-L5/S1 moderate to severe facet degenerative arthrosis, as discussed   above. 4. Incidental finding of left lower lung lobe inferior calcified pleural   plaques suggesting association with asbestos related pleural disease. CT THORACIC SPINE TRAUMA RECONSTRUCTION   Final Result   1. No acute abnormality of the thoracic or lumbar spine. 2. No significant thoracic or lumbar spine spondylosis. 3. L3/L4-L5/S1 moderate to severe facet degenerative arthrosis, as discussed   above. 4. Incidental finding of left lower lung lobe inferior calcified pleural   plaques suggesting association with asbestos related pleural disease. CT ABDOMEN PELVIS WO CONTRAST Additional Contrast? None   Final Result   No acute findings. Suspect solid left renal mass. Recommend follow-up with contrast enhanced CT   when clinically appropriate. CT CHEST WO CONTRAST   Preliminary Result   1. No acute cardiopulmonary disease. 2. Cardiomegaly with severe coronary artery atherosclerosis. Status post   CABG. 3. Chronic lung changes including emphysema and early fibrosis. Calcified   pleural plaques may reflect prior asbestos exposure. CT CERVICAL SPINE WO CONTRAST   Final Result   No acute intracranial abnormality. No acute fracture or subluxation in the cervical spine. CT HEAD WO CONTRAST   Final Result   No acute intracranial abnormality. No acute fracture or subluxation in the cervical spine.              Labs  Results for orders placed or performed during the hospital encounter of 12/16/20   CBC Auto Differential   Result Value Ref Range    WBC 9.3 4.0 - 11.0 K/uL    RBC 4.99 4.00 - 5.20 M/uL    Hemoglobin 14.2 12.0 - 16.0 g/dL    Hematocrit 44.2 36.0 - 48.0 %    MCV 88.5 80.0 - 100.0 fL    MCH 28.4 26.0 - 34.0 pg    MCHC 32.1 31.0 - 36.0 g/dL    RDW 19.9 (H) 12.4 - 15.4 %    Platelets 247 702 - 845 K/uL    MPV 9.0 5.0 - 10.5 fL    Neutrophils % 74.5 %    Lymphocytes % 13.1 %    Monocytes % 7.2 %    Eosinophils % 3.9 %    Basophils % 1.3 %    Neutrophils Absolute 6.9 1.7 - 7.7 K/uL    Lymphocytes Absolute 1.2 1.0 - 5.1 K/uL    Monocytes Absolute 0.7 0.0 - 1.3 K/uL    Eosinophils Absolute 0.4 0.0 - 0.6 K/uL    Basophils Absolute 0.1 0.0 - 0.2 K/uL   Basic Metabolic Panel w/ Reflex to MG   Result Value Ref Range    Sodium 142 136 - 145 mmol/L    Potassium reflex Magnesium 4.9 3.5 - 5.1 mmol/L    Chloride 111 (H) 99 - 110 mmol/L    CO2 21 21 - 32 mmol/L    Anion Gap 10 3 - 16    Glucose 114 (H) 70 - 99 mg/dL    BUN 43 (H) 7 - 20 mg/dL    CREATININE 1.4 (H) 0.6 - 1.2 mg/dL    GFR Non- 36 (A) >60    GFR  44 (A) >60    Calcium 9.0 8.3 - 10.6 mg/dL   TYPE AND SCREEN   Result Value Ref Range    ABO/Rh A NEG     Antibody Screen NEG        Screenings           MDM and ED Course  Patient is a 77-year-old woman who presents with mechanical fall with no preceding symptoms. Has unremarkable vital signs. Is on anticoagulation and presents with right CVA/flank pain/bruise from falling. Will obtain CT chest abdomen pelvis. Has history of chronic kidney disease and does not wish to receive IV contrast at this time, stating that her kidney doctor told her not to. (EMP MDM)    CT scans showed no acute fracture or bleed. She feels much better. We will ambulate the patient and if ambulating well, anticipate discharge. Incidentally, there were some CT scan findings that were communicated to the patient that will require follow-up.   We also spoke with her daughter who will be watching her overnight.    ------------    As patient was cutting ready to be discharged, she had pain return in her right flank. Spoke with the patient about benefits and risks of obtaining CT scan with contrast which she initially preferred not to get however given the  Return of her pain, Felt that benefits now outweigh risks. Accepted by Hans Duncan for new actively bleeding into SQ tissue by 12th rib. Starting on kaycentra. [facility] transfer to [facility] trauma center        [unfilled]    Final Impression  1. Fall, initial encounter    2. Hematoma        Blood pressure 128/69, pulse 72, temperature 97.3 °F (36.3 °C), resp. rate 20, height 5' 3\" (1.6 m), weight 130 lb (59 kg), SpO2 96 %, not currently breastfeeding. Disposition:  DISPOSITION  12/16/2020 04:15:21 AM      Patient Referrals:  Brigitte Roberts MD  [address]  [phone]    In 1 day        Discharge Medications:  New Prescriptions    No medications on file       Discontinued Medications:  Discontinued Medications    No medications on file       This chart was generated using the [address] [place] [address] dictation system. I created this record but it may contain dictation errors given the limitations of this technology.         Elbert John MD  12/16/20 [address] Bryan Yarbrough MD  12/16/20 7516

## 2020-12-16 NOTE — ED NOTES
Prestige here to get PT. PT placed in hospital gown, all belongings sent home with daughter.      Valerie Sheldon RN  12/16/20 0800

## 2020-12-17 ENCOUNTER — TELEPHONE (OUTPATIENT)
Dept: FAMILY MEDICINE CLINIC | Age: 80
End: 2020-12-17

## 2020-12-17 LAB
ANION GAP SERPL CALCULATED.3IONS-SCNC: 12 MMOL/L (ref 3–16)
BUN BLDV-MCNC: 34 MG/DL (ref 7–25)
CALCIUM SERPL-MCNC: 8.2 MG/DL (ref 8.6–10.3)
CHLORIDE BLD-SCNC: 114 MMOL/L (ref 98–110)
CO2: 18 MMOL/L (ref 21–33)
CREAT SERPL-MCNC: 1.19 MG/DL (ref 0.6–1.3)
GFR, ESTIMATED: 43 SEE NOTE.
GFR, ESTIMATED: 50 SEE NOTE.
GLUCOSE BLD-MCNC: 101 MG/DL (ref 70–100)
OSMOLALITY CALCULATION: 306 MOSM/KG (ref 278–305)
POTASSIUM SERPL-SCNC: 4.3 MMOL/L (ref 3.5–5.3)
SODIUM BLD-SCNC: 144 MMOL/L (ref 133–146)

## 2020-12-18 ENCOUNTER — TELEPHONE (OUTPATIENT)
Dept: CARDIOLOGY CLINIC | Age: 80
End: 2020-12-18

## 2020-12-18 NOTE — TELEPHONE ENCOUNTER
Lm that appt is at 10:30 on 12/23 instead of 11am . The 11am was actually already taken by someone else .

## 2020-12-18 NOTE — TELEPHONE ENCOUNTER
Daughter asking to make HSF appt on 12/23 with DANIELLE . She was at [facility] and was transferred to [facility] . Offered appt on Tuesday but she already has another doctor appt on Tuesday at 8:30am in [place].  Please call daughter if she can bew seen on 12/23

## 2020-12-21 ENCOUNTER — OFFICE VISIT (OUTPATIENT)
Dept: FAMILY MEDICINE CLINIC | Age: 80
End: 2020-12-21
Payer: MEDICARE

## 2020-12-21 VITALS
HEART RATE: 71 BPM | SYSTOLIC BLOOD PRESSURE: 122 MMHG | OXYGEN SATURATION: 98 % | TEMPERATURE: 98.2 F | RESPIRATION RATE: 12 BRPM | DIASTOLIC BLOOD PRESSURE: 80 MMHG | WEIGHT: 130.38 LBS | BODY MASS INDEX: 23.09 KG/M2

## 2020-12-21 PROCEDURE — G8427 DOCREV CUR MEDS BY ELIG CLIN: HCPCS | Performed by: FAMILY MEDICINE

## 2020-12-21 PROCEDURE — 1090F PRES/ABSN URINE INCON ASSESS: CPT | Performed by: FAMILY MEDICINE

## 2020-12-21 PROCEDURE — 1036F TOBACCO NON-USER: CPT | Performed by: FAMILY MEDICINE

## 2020-12-21 PROCEDURE — 1123F ACP DISCUSS/DSCN MKR DOCD: CPT | Performed by: FAMILY MEDICINE

## 2020-12-21 PROCEDURE — 99214 OFFICE O/P EST MOD 30 MIN: CPT | Performed by: FAMILY MEDICINE

## 2020-12-21 PROCEDURE — 4040F PNEUMOC VAC/ADMIN/RCVD: CPT | Performed by: FAMILY MEDICINE

## 2020-12-21 PROCEDURE — G8399 PT W/DXA RESULTS DOCUMENT: HCPCS | Performed by: FAMILY MEDICINE

## 2020-12-21 PROCEDURE — G8420 CALC BMI NORM PARAMETERS: HCPCS | Performed by: FAMILY MEDICINE

## 2020-12-21 PROCEDURE — G8482 FLU IMMUNIZE ORDER/ADMIN: HCPCS | Performed by: FAMILY MEDICINE

## 2020-12-21 RX ORDER — TOLTERODINE 2 MG/1
2 CAPSULE, EXTENDED RELEASE ORAL DAILY
Qty: 30 CAPSULE | Refills: 5 | Status: SHIPPED | OUTPATIENT
Start: 2020-12-21 | End: 2021-03-04

## 2020-12-21 RX ORDER — AMOXICILLIN 250 MG
1 CAPSULE ORAL DAILY PRN
COMMUNITY
Start: 2020-12-17 | End: 2021-03-04

## 2020-12-21 RX ORDER — ACETAMINOPHEN 325 MG/1
975 TABLET ORAL EVERY 8 HOURS
COMMUNITY
Start: 2020-12-17 | End: 2020-12-24

## 2020-12-21 RX ORDER — LIDOCAINE 50 MG/G
1 PATCH TOPICAL EVERY 24 HOURS
Status: ON HOLD | COMMUNITY
Start: 2020-12-18 | End: 2021-03-08 | Stop reason: ALTCHOICE

## 2020-12-21 NOTE — PROGRESS NOTES
[facility]    Idiopathic peripheral neuropathy    Ischemic cardiomyopathy    Dizziness    Peripheral vertigo, bilateral    PA[facility]paroxysmal atrial fibrillation) ([facility])    Dyslipidemia    Heart failure (HCC)    Iron deficiency anemia    Anemia    Bilateral sensorineural hearing loss    Memory loss due to medical condition       Outpatient Medications Marked as Taking for the 12/21/20 encounter (Office Visit) with Faith Larkin MD   Medication Sig Dispense Refill    acetaminophen (TYLENOL) 325 MG tablet Take 975 mg by mouth every 8 hours      lidocaine (LIDODERM) 5 % Place 1 patch onto the skin every 24 hours      senna-docusate (PERICOLACE) 8.6-50 MG per tablet Take 1 tablet by mouth daily as needed      tolterodine (DETROL LA) 2 MG extended release capsule Take 1 capsule by mouth daily 30 capsule 5    DULoxetine (CYMBALTA) 60 MG extended release capsule Take 1 capsule by mouth daily 90 capsule 1    allopurinol (ZYLOPRIM) 100 MG tablet Take 1 tablet by mouth daily 90 tablet 1    spironolactone (ALDACTONE) 25 MG tablet Take 0.5 tablets by mouth three times a week 30 tablet 0    vitamin D (ERGOCALCIFEROL) 1.25 MG (49058 UT) CAPS capsule Take 1 capsule by mouth once a week 12 capsule 3    metoprolol succinate (TOPROL XL) 25 MG extended release tablet Take 1 tablet by mouth 2 times daily 60 tablet 1    torsemide (DEMADEX) 10 MG tablet TAKE 1 TABLET EVERY OTHER DAY ALTERNATING WITH  2  TABLETS EVERY OTHER  tablet 0    levothyroxine (SYNTHROID) 112 MCG tablet TAKE 1 TABLET EVERY DAY 90 tablet 1    topiramate (TOPAMAX) 50 MG tablet Take 1 tablet by mouth 2 times daily 60 tablet 3       Allergies   Allergen Reactions    Aspirin Nausea Only    Ativan [Lorazepam] Other (See Comments)     hallucinations    Diltiazem Anaphylaxis    Sulfa Antibiotics Rash and Hives    Dabigatran Nausea Only     And indigestion  And indigestion    Aka Pradaxa    Lipitor [Atorvastatin] Other (See Comments) Muscle pains    Mysoline [Primidone]     Nsaids     Other Other (See Comments)     Nitroglycerin patches causes severe headaches       Social History     Tobacco Use    Smoking status: Former Smoker     Packs/day: 1.00     Years: 28.00     Pack years: 28.00     Types: Cigarettes     Quit date: 1987     Years since quittin.9    Smokeless tobacco: Never Used    Tobacco comment: H.O.smoking at age 15 / smoked up to 1 p.p.d / quit    Substance Use Topics    Alcohol use: Not Currently     Alcohol/week: 0.0 standard drinks       /80 (Site: Left Upper Arm, Position: Sitting, Cuff Size: Medium Adult)   Pulse 71   Temp 98.2 °F (36.8 °C) (Infrared)   Resp 12   Wt 130 lb 6 oz (59.1 kg)   SpO2 98%   BMI 23.09 kg/m²     Objective:   Physical Exam  Constitutional:       General: She is not in acute distress. Appearance: She is well-developed. Neck:      Vascular: No carotid bruit. Cardiovascular:      Rate and Rhythm: Normal rate. Rhythm irregularly irregular. Pulses:           Dorsalis pedis pulses are 2+ on the right side and 2+ on the left side. Posterior tibial pulses are 2+ on the right side and 2+ on the left side. Heart sounds: Murmur present. Systolic (Right sternal border) murmur present with a grade of 2/6. No friction rub. No gallop. Pulmonary:      Effort: Pulmonary effort is normal.      Breath sounds: Normal breath sounds. Chest:      Chest wall: Deformity and tenderness present. Comments: Right flank chest area with a large hematoma of approximately 6 x 6 cm. There is also surrounding ecchymosis that extends out another 4 cm. Abdominal:      General: Bowel sounds are normal. There is no distension. Palpations: Abdomen is soft. Tenderness: There is no abdominal tenderness. There is right CVA tenderness. There is no left CVA tenderness or guarding. Musculoskeletal: Normal range of motion. Right lower leg: No edema.       Left lower leg: No edema. Neurological:      General: No focal deficit present. Mental Status: She is alert and oriented to person, place, and time. Sensory: Sensation is intact. Motor: Motor function is intact. Psychiatric:         Behavior: Behavior is cooperative. Assessment:      Omaira Barbosa was seen today for follow-up from hospital.    Diagnoses and all orders for this visit:    Hematoma of right flank, initial encounter    Recurrent falls    Chronic atrial fibrillation    Anemia, unspecified type    Other orders  -     tolterodine (DETROL LA) 2 MG extended release capsule; Take 1 capsule by mouth daily            Plan:      Hospital information reviewed with patient and daughter. Discussed that she did not hurt her kidney but she has a large hematoma. Recommended symptomatic treatment for this including Tylenol for discomfort, pain patches, continue with abdominal binder and local heat or ice. Certainly with recurrent falls and now with the hematoma advised that she should stay off anticoagulation for the next 2 weeks and then going to reevaluate her. Assuming the hematoma has improved probably start her on full dose aspirin therapy. Anemia is stable for her. Medical decision making of moderate complexity. RTC 2 weeks    Please note that this chart was generated using Dragon dictation software. Although every effort was made to ensure the accuracy of this automated transcription, some errors in transcription may have occurred.

## 2020-12-23 ENCOUNTER — OFFICE VISIT (OUTPATIENT)
Dept: CARDIOLOGY CLINIC | Age: 80
End: 2020-12-23
Payer: MEDICARE

## 2020-12-23 ENCOUNTER — HOSPITAL ENCOUNTER (OUTPATIENT)
Age: 80
Discharge: HOME | End: 2020-12-23
Payer: MEDICARE

## 2020-12-23 VITALS
DIASTOLIC BLOOD PRESSURE: 62 MMHG | RESPIRATION RATE: 20 BRPM | HEIGHT: 63 IN | WEIGHT: 131 LBS | OXYGEN SATURATION: 96 % | SYSTOLIC BLOOD PRESSURE: 134 MMHG | BODY MASS INDEX: 23.21 KG/M2 | HEART RATE: 70 BPM

## 2020-12-23 LAB
ALBUMIN SERPL-MCNC: 3.5 G/DL (ref 3.4–5)
ANION GAP SERPL CALCULATED.3IONS-SCNC: 11 MMOL/L (ref 3–16)
BUN BLDV-MCNC: 35 MG/DL (ref 7–20)
CALCIUM SERPL-MCNC: 9 MG/DL (ref 8.3–10.6)
CHLORIDE BLD-SCNC: 107 MMOL/L (ref 99–110)
CO2: 24 MMOL/L (ref 21–32)
CREAT SERPL-MCNC: 1.3 MG/DL (ref 0.6–1.2)
GFR AFRICAN AMERICAN: 48
GFR NON-AFRICAN AMERICAN: 39
GLUCOSE BLD-MCNC: 98 MG/DL (ref 70–99)
HCT VFR BLD CALC: 37.1 % (ref 36–48)
HEMOGLOBIN: 11.5 G/DL (ref 12–16)
MCH RBC QN AUTO: 27.9 PG (ref 26–34)
MCHC RBC AUTO-ENTMCNC: 31 G/DL (ref 31–36)
MCV RBC AUTO: 89.9 FL (ref 80–100)
PDW BLD-RTO: 19.3 % (ref 12.4–15.4)
PHOSPHORUS: 3.3 MG/DL (ref 2.5–4.9)
PLATELET # BLD: 355 K/UL (ref 135–450)
PMV BLD AUTO: 9.3 FL (ref 5–10.5)
POTASSIUM SERPL-SCNC: 4.5 MMOL/L (ref 3.5–5.1)
RBC # BLD: 4.13 M/UL (ref 4–5.2)
SODIUM BLD-SCNC: 142 MMOL/L (ref 136–145)
WBC # BLD: 9 K/UL (ref 4–11)

## 2020-12-23 PROCEDURE — 4040F PNEUMOC VAC/ADMIN/RCVD: CPT | Performed by: CLINICAL NURSE SPECIALIST

## 2020-12-23 PROCEDURE — G8399 PT W/DXA RESULTS DOCUMENT: HCPCS | Performed by: CLINICAL NURSE SPECIALIST

## 2020-12-23 PROCEDURE — 1123F ACP DISCUSS/DSCN MKR DOCD: CPT | Performed by: CLINICAL NURSE SPECIALIST

## 2020-12-23 PROCEDURE — G8482 FLU IMMUNIZE ORDER/ADMIN: HCPCS | Performed by: CLINICAL NURSE SPECIALIST

## 2020-12-23 PROCEDURE — G8420 CALC BMI NORM PARAMETERS: HCPCS | Performed by: CLINICAL NURSE SPECIALIST

## 2020-12-23 PROCEDURE — 85027 COMPLETE CBC AUTOMATED: CPT

## 2020-12-23 PROCEDURE — G8427 DOCREV CUR MEDS BY ELIG CLIN: HCPCS | Performed by: CLINICAL NURSE SPECIALIST

## 2020-12-23 PROCEDURE — 99214 OFFICE O/P EST MOD 30 MIN: CPT | Performed by: CLINICAL NURSE SPECIALIST

## 2020-12-23 PROCEDURE — 1090F PRES/ABSN URINE INCON ASSESS: CPT | Performed by: CLINICAL NURSE SPECIALIST

## 2020-12-23 PROCEDURE — 1036F TOBACCO NON-USER: CPT | Performed by: CLINICAL NURSE SPECIALIST

## 2020-12-23 PROCEDURE — 80069 RENAL FUNCTION PANEL: CPT

## 2020-12-23 PROCEDURE — 36415 COLL VENOUS BLD VENIPUNCTURE: CPT

## 2020-12-23 NOTE — PROGRESS NOTES
[facility]  Progress Note    Primary Care Doctor:  Mike Martin MD    Chief Complaint   Patient presents with    Follow-up    Congestive Heart Failure        History of Present Illness:  [de-identified] y.o. female with history of Ms. Neris Nichols She has a history of atrial fibrillation(on xarelto), TIA, chronic kidney disease, CHF, hypertension, hyperlipidemia, mitral valve disease, hypothyroidism. Yoli Morley last LVEF was 45% on 7/19/19. CABG, 7/08 cath- patent LIMA to LAD, SVG occluded to RCA; 8/2019 left to right fem-fem bypass and left fem to above knee popliteal bypass    I had the pleasure of seeing Myrna Mcgee in follow up for sHF. She is in a wheel chair as she had a fall (lifting her dog into bed) and has a huge hematoma on her right lateral side to back. She was sent to [facility]. She has an abdominal binder in place. She is off her plavix and xarelto for 2 weeks. Her metoprolol dose was adjusted. She declines any further dizziness. Her weight and ankle edema are better since restarting low dose aldactone.   She completed 3 of the 5 iron infusions, Hgb is stable  She is nervous being off anticoag    Past Medical History:   has a past medical history of Allergic rhinitis, cause unspecified, Arthritis, Atrial fibrillation ([address] Bronchopneumonia, CAD (coronary artery disease), Cerebral artery occlusion with cerebral infarction ([address] Chronic gouty arthropathy, Chronic kidney disease, Congestive heart failure, unspecified, Degeneration of cervical intervertebral disc, Essential and other specified forms of tremor, Gout, HIGH CHOLESTEROL, History of CVA (cerebrovascular accident), Hx of blood clots, Hypertension, Influenza, Mitral valve stenosis and aortic valve insufficiency, Movement disorder, Pacemaker, Peptic ulcer, unspecified site, unspecified as acute or chronic, without mention of hemorrhage, perforation, or obstruction, Thyroid disease, Unspecified disorder of kidney and ureter, and Unspecified hypothyroidism. Surgical History:   has a past surgical history that includes back surgery; Cholecystectomy; Cardiac surgery; Coronary artery bypass graft (1987); shoulder surgery; Cardiac catheterization (7/2012); hip surgery (Left, 03/16/2017); joint replacement; Cardiac catheterization (08/07/2018); Percutaneous Transluminal Coronary Angio (11/04/2014); pr njx aa&/strd tfrml epi lumbar/sacral 1 level (Right, 12/3/2018); Femoral-femoral Bypass Graft (N/A, 8/22/2019); and femoral bypass (Left, 8/22/2019). Social History:   reports that she quit smoking about 34 years ago. Her smoking use included cigarettes. She has a 28.00 pack-year smoking history. She has never used smokeless tobacco. She reports previous alcohol use. She reports that she does not use drugs. Family History:   Family History   Problem Relation Age of Onset    Cancer Sister     Heart Disease Sister     Diabetes Brother     Hypertension Brother     Heart Disease Brother     Stroke Maternal Aunt     Heart Disease Maternal Aunt     Diabetes Maternal Uncle     Hypertension Paternal Aunt     Cancer Maternal Grandmother     Cancer Paternal Grandmother     Rheum Arthritis Neg Hx     Lupus Neg Hx        Home Medications:  Prior to Admission medications    Medication Sig Start Date End Date Taking?  Authorizing Provider   acetaminophen (TYLENOL) 325 MG tablet Take 975 mg by mouth every 8 hours 12/17/20 12/24/20 Yes Historical Provider, MD   lidocaine (LIDODERM) 5 % Place 1 patch onto the skin every 24 hours 12/18/20  Yes Historical Provider, MD   senna-docusate (Doylene [place]) 8.6-50 MG per tablet Take 1 tablet by mouth daily as needed 12/17/20  Yes Historical Provider, MD   tolterodine (DETROL LA) 2 MG extended release capsule Take 1 capsule by mouth daily 12/21/20  Yes Tree Canela MD   DULoxetine (CYMBALTA) 60 MG extended release capsule Take 1 capsule by mouth daily 12/8/20  Yes Tree Canela MD   allopurinol (ZYLOPRIM) 100 MG tablet Take 1 tablet by mouth daily 12/4/20  Yes Faith Larkin MD   spironolactone (ALDACTONE) 25 MG tablet Take 0.5 tablets by mouth three times a week 11/13/20  Yes Marin Kanner, APRN - CNS   vitamin D (ERGOCALCIFEROL) 1.25 MG (54264 UT) CAPS capsule Take 1 capsule by mouth once a week 11/9/20  Yes Faith Larkin MD   metoprolol succinate (TOPROL XL) 25 MG extended release tablet Take 1 tablet by mouth 2 times daily 11/8/20  Yes TIFFANIE Welsh - CNP   torsemide (DEMADEX) 10 MG tablet TAKE 1 TABLET EVERY OTHER DAY ALTERNATING WITH  2  TABLETS EVERY OTHER DAY 10/14/20  Yes TIFFANIE Guerra - CNS   levothyroxine (SYNTHROID) 112 MCG tablet TAKE 1 TABLET EVERY DAY 9/8/20  Yes Faith Larkin MD   topiramate (TOPAMAX) 50 MG tablet Take 1 tablet by mouth 2 times daily 3/28/20  Yes Yeyo Ashraf MD        Allergies:  Aspirin, Ativan [lorazepam], Diltiazem, Sulfa antibiotics, Dabigatran, Lipitor [atorvastatin], Mysoline [primidone], Nsaids, and Other     Review of Systems:   · Constitutional: there has been no unanticipated weight loss. There's been no change in energy level, sleep pattern, or activity level. · Eyes: No visual changes or diplopia. No scleral icterus. · ENT: No Headaches, hearing loss or vertigo. No mouth sores or sore throat. · Cardiovascular: Reviewed in HPI  · Respiratory: No cough or wheezing, no sputum production. No hematemesis. · Gastrointestinal: No abdominal pain, appetite loss, blood in stools. No change in bowel or bladder habits. States she feels bloated   · Genitourinary: No dysuria, trouble voiding, or hematuria. · Musculoskeletal:  No gait disturbance, weakness or joint complaints. · Integumentary: No rash or pruritis. · Neurological: No headache, diplopia, change in muscle strength, numbness or tingling. No change in gait, balance, coordination, mood, affect, memory, mentation, behavior. · Psychiatric: No anxiety, no depression.   · Endocrine: No malaise, fatigue or temperature intolerance. No excessive thirst, fluid intake, or urination. No tremor. · Hematologic/Lymphatic: No abnormal bruising or bleeding, blood clots or swollen lymph nodes. · Allergic/Immunologic: No nasal congestion or hives. Physical Examination:    Vitals:    12/23/20 1033   BP: 134/62   Site: Left Upper Arm   Position: Sitting   Cuff Size: Medium Adult   Pulse: 70   Resp: 20   SpO2: 96%   Weight: 131 lb (59.4 kg)   Height: 5' 3\" (1.6 m)        Constitutional and General Appearance: Warm and dry, no apparent distress, normal coloration  HEENT:  Normocephalic, atraumatic  Respiratory:  · Normal excursion and expansion without use of accessory muscles  · Resp Auscultation: Normal breath sounds without dullness  Cardiovascular:  · The apical impulses not displaced  · Heart tones are crisp and normal  · JVP normal H2O  · Regular rate and irregular rhythm  · Peripheral pulses are symmetrical and full  · There is no clubbing, cyanosis of the extremities.   · No ankle edema  · Pedal Pulses: 2+ and equal   Abdomen:large abdominal hematoma/bruise on right lateral side  · No masses or tenderness  · Liver/Spleen: No Abnormalities Noted  Neurological/Psychiatric:  · Alert and oriented in all spheres  · Moves all extremities well  · Exhibits normal gait balance and coordination  · No abnormalities of mood, affect, memory, mentation, or behavior are noted    Lab Data:    CBC:   Lab Results   Component Value Date    WBC 12.4 12/17/2020    WBC 12.4 12/16/2020    WBC 8.6 12/16/2020    RBC 4.50 12/17/2020    RBC 4.71 12/16/2020    RBC 4.61 12/16/2020    HGB 12.3 12/17/2020    HGB 12.8 12/16/2020    HGB 13.7 12/16/2020    HCT 40.1 12/17/2020    HCT 41.7 12/16/2020    HCT 41.1 12/16/2020    MCV 89.2 12/17/2020    MCV 88.6 12/16/2020    MCV 89.5 12/16/2020    RDW 19.4 12/17/2020    RDW 19.7 12/16/2020    RDW 19.5 12/16/2020     12/17/2020     12/16/2020     12/16/2020 added. LDL 75   8/7/18  The PCI of complex proximal RCA chronic total occlusion was unsuccessful (J- score 3). Underwent successful Angioplasty of high-grade proximal RCA lesion proximal to the . RECOMMENDATIONS:  Attempt on this complex long  was unsuccessful. Lack   Of retrograde collaterals along with a very ambiguous cap as well as a large RV marginal branch and bridging collateral coming off at the cap makes it a  challenging repeat attempt. If she continues to have angina, it is recommended to proceed with the single-vessel SVG to the RCA. Echo: 2/17/16 ([facility])  Conclusions: Normal LV size and systolic function Mild to moderate mitral  regurgitation Mild tricuspid regurgitation  Findings:   Left Atrium: Mild to moderate enlargement of the left atrium. Left Ventricle: Upper limits of normal left ventricle size. No left ventricular  hypertrophy. Normal left ventricular systolic function. The ejection fraction   Is visually estimated to be 55 %. Right At[facility]: Normal right atrial size. RightVentricle: Normal right ventricle size. Normal right ventricular function. Aortic Valve: Tri-leaflet aortic valve. Mild aortic cusp calcification. No  aortic valve stenosis. Mild (1+) aortic valve regurgitation. Aorta: No dilatation of the aortic root. IVC/PA: Normal IVC size and normal respiratory  collapse consistent with normal right atrial pressure. Mitral Valve: Mild mitral valve leaflet calcification. Mild to moderate (2+) mitral valve  regurgitation. No mitral valve stenosis. Tricuspid Valve: Mild (1+) tricuspid  regurgitation. The pulmonary artery pressure is normal.   Pulmonic Valve: Mild  pulmonic regurgitation. Pericardium: Normal pericardium with no significant  pericardial effusion. Assessment:    1. Chronic systolic heart failure (HCC) on bb, no ace/arb due to dizziness; aldosterone antagonist    2. Chronic atrial fibrillation    3.  Coronary artery disease involving autologous artery coronary bypass graft without angina pectoris    4. Renal insufficiency follows with Dr Gonzalo Salvador:   1. Continue all current medications  2. No refills needed  3. Of anticoag due to fall and hematoma  4. Check blood work today  5. RTO in 2 months at [address]    I appreciate the opportunity of cooperating in the care of this individual.    WINSTON Billy, 12/23/2020, 10:40 AM    QUALITY MEASURES  1. Tobacco Cessation Counseling: NA  2. Retake of BP if >140/90:   NA  3. Documentation to PCP/referring for new patient:  Sent to PCP at close of office visit  4. CAD patient on anti-platelet: Yes  5. CAD patient on STATIN therapy:  No did not tolerate  6.  Patient with CHF and aFib on anticoagulation:  Yes

## 2020-12-23 NOTE — PATIENT INSTRUCTIONS
1.  Continue all current medications  2. No refills needed  3. Of anticoag due to fall and hematoma  4. Check blood work today  5.   RTO in 2 months at [address]

## 2021-01-04 ENCOUNTER — VIRTUAL VISIT (OUTPATIENT)
Dept: FAMILY MEDICINE CLINIC | Age: 81
End: 2021-01-04
Payer: MEDICARE

## 2021-01-04 DIAGNOSIS — R25.1 TREMOR: ICD-10-CM

## 2021-01-04 DIAGNOSIS — N18.30 CHRONIC RENAL FAILURE, STAGE 3 (MODERATE), UNSPECIFIED WHETHER STAGE 3A OR 3B CKD (HCC): ICD-10-CM

## 2021-01-04 DIAGNOSIS — T14.8XXA HEMATOMA: Primary | ICD-10-CM

## 2021-01-04 DIAGNOSIS — R19.7 DIARRHEA, UNSPECIFIED TYPE: ICD-10-CM

## 2021-01-04 PROCEDURE — 1090F PRES/ABSN URINE INCON ASSESS: CPT | Performed by: FAMILY MEDICINE

## 2021-01-04 PROCEDURE — 99214 OFFICE O/P EST MOD 30 MIN: CPT | Performed by: FAMILY MEDICINE

## 2021-01-04 PROCEDURE — G8428 CUR MEDS NOT DOCUMENT: HCPCS | Performed by: FAMILY MEDICINE

## 2021-01-04 PROCEDURE — 4040F PNEUMOC VAC/ADMIN/RCVD: CPT | Performed by: FAMILY MEDICINE

## 2021-01-04 PROCEDURE — 1123F ACP DISCUSS/DSCN MKR DOCD: CPT | Performed by: FAMILY MEDICINE

## 2021-01-04 PROCEDURE — G8399 PT W/DXA RESULTS DOCUMENT: HCPCS | Performed by: FAMILY MEDICINE

## 2021-01-04 RX ORDER — TOPIRAMATE 50 MG/1
100 TABLET, FILM COATED ORAL 2 TIMES DAILY
Qty: 180 TABLET | Refills: 1 | Status: SHIPPED | OUTPATIENT
Start: 2021-01-04 | End: 2021-01-11 | Stop reason: SDUPTHER

## 2021-01-04 RX ORDER — METOPROLOL SUCCINATE 25 MG/1
25 TABLET, EXTENDED RELEASE ORAL 2 TIMES DAILY
Qty: 180 TABLET | Refills: 1 | Status: SHIPPED | OUTPATIENT
Start: 2021-01-04 | End: 2021-01-11 | Stop reason: SDUPTHER

## 2021-01-04 RX ORDER — ASPIRIN 81 MG/1
81 TABLET ORAL DAILY
Qty: 90 TABLET | Refills: 1
Start: 2021-01-04 | End: 2021-02-03 | Stop reason: ALTCHOICE

## 2021-01-04 RX ORDER — LOPERAMIDE HYDROCHLORIDE 2 MG/1
2 CAPSULE ORAL DAILY
Qty: 30 CAPSULE | Refills: 3 | Status: SHIPPED | OUTPATIENT
Start: 2021-01-04 | End: 2021-01-12

## 2021-01-04 NOTE — PROGRESS NOTES
 Sick sinus syndrome (HCC)    Allergic rhinitis    Fibrocystic breast    [address]vascular [address] ([address]A) [address]    HTN (hypertension), benign    Pacemaker    Primary osteoarthritis involving multiple joints    Vitamin D deficiency    Tremor    Chronic total occlusion of native coronary artery    Age related osteoporosis    Acute on chronic systolic (congestive) heart failure (HCC)    Chronic renal failure, stage 3 (moderate)    PAD (peripheral artery disease) ([facility]    Atherosclerosis of native arteries of extremities with intermittent claudication, bilateral legs ([facility])    Idiopathic peripheral neuropathy    Ischemic cardiomyopathy    Dizziness    Peripheral vertigo, bilateral    PAF (paroxysmal atrial fibrillation) ([facility]    Dyslipidemia    Heart failure ([facility])    Iron deficiency anemia    Anemia    Bilateral sensorineural hearing loss    Memory loss due to medical condition       Outpatient Medications Marked as Taking for the 1/4/21 encounter (Virtual Visit) with Brittany Ghotra MD   Medication Sig Dispense Refill    topiramate (TOPAMAX) 50 MG tablet Take 2 tablets by mouth 2 times daily 180 tablet 1    loperamide (RA ANTI-DIARRHEAL) 2 MG capsule Take 1 capsule by mouth daily 30 capsule 3    aspirin EC 81 MG EC tablet Take 1 tablet by mouth daily 90 tablet 1    metoprolol succinate (TOPROL XL) 25 MG extended release tablet Take 1 tablet by mouth 2 times daily 180 tablet 1    tolterodine (DETROL LA) 2 MG extended release capsule Take 1 capsule by mouth daily 30 capsule 5    DULoxetine (CYMBALTA) 60 MG extended release capsule Take 1 capsule by mouth daily 90 capsule 1    allopurinol (ZYLOPRIM) 100 MG tablet Take 1 tablet by mouth daily 90 tablet 1    spironolactone (ALDACTONE) 25 MG tablet Take 0.5 tablets by mouth three times a week 30 tablet 0    vitamin D (ERGOCALCIFEROL) 1.25 MG (15002 UT) CAPS capsule Take 1 capsule by mouth once a week 12 capsule 3  torsemide (DEMADEX) 10 MG tablet TAKE 1 TABLET EVERY OTHER DAY ALTERNATING WITH  2  TABLETS EVERY OTHER  tablet 0    levothyroxine (SYNTHROID) 112 MCG tablet TAKE 1 TABLET EVERY DAY 90 tablet 1       Allergies   Allergen Reactions    Aspirin Nausea Only    Ativan [Lorazepam] Other (See Comments)     hallucinations    Diltiazem Anaphylaxis    Sulfa Antibiotics Rash and Hives    Dabigatran Nausea Only     And indigestion  And indigestion    Aka Pradaxa    Lipitor [Atorvastatin] Other (See Comments)     Muscle pains    Mysoline [Primidone]     Nsaids     Other Other (See Comments)     Nitroglycerin patches causes severe headaches       Social History     Tobacco Use    Smoking status: Former Smoker     Packs/day: 1.00     Years: 28.00     Pack years: 28.00     Types: Cigarettes     Quit date: 1987     Years since quittin.0    Smokeless tobacco: Never Used    Tobacco comment: H.O.smoking at age 15 / smoked up to 1 p.p.d / quit    Substance Use Topics    Alcohol use: Not Currently     Alcohol/week: 0.0 standard drinks         PHYSICAL EXAMINATION:  [ INSTRUCTIONS:  \"[x]\" Indicates a positive item  \"[]\" Indicates a negative item  -- DELETE ALL ITEMS NOT EXAMINED]  Vital Signs: (As obtained by patient/caregiver or practitioner observation)    There were no vitals taken for this visit. Blood pressure-  Heart rate-  Weight-    Temperature-  Pulse oximetry-     Constitutional: [x] Appears well-developed and well-nourished [x] No apparent distress      [] Abnormal-   Mental status  [x] Alert and awake  [x] Oriented to person/place/time [x]Able to follow commands      Eyes:  EOM    []  Normal  [] Abnormal-  Sclera  []  Normal  [] Abnormal -         Discharge []  None visible  [] Abnormal -    HENT:   [x] Normocephalic, atraumatic.   [] Abnormal   [] Mouth/Throat: Mucous membranes are moist.     External Ears [] Normal  [] Abnormal-     Neck: [x] No visualized mass Pulmonary/Chest: [x] Respiratory effort normal.  [x] No visualized signs of difficulty breathing or respiratory distress        [] Abnormal-      Musculoskeletal:   [] Normal gait with no signs of ataxia         [] Normal range of motion of neck        [x] Abnormal-significant tremor noted of both upper extremities. Neurological:        [] No Facial Asymmetry (Cranial nerve 7 motor function) (limited exam to video visit)          [] No gaze palsy        [] Abnormal-         Skin:        [] No significant exanthematous lesions or discoloration noted on facial skin         [x] Abnormal-hematoma on camera appears to be about the same size although the surrounding ecchymosis appears to be improving. Psychiatric:       [] Normal Affect [] No Hallucinations        [] Abnormal-     Other pertinent observable physical exam findings-     Due to this being a TeleHealth encounter, evaluation of the following organ systems is limited: Vitals/Constitutional/EENT/Resp/CV/GI//MS/Neuro/Skin/Heme-Lymph-Imm. ASSESSMENT/PLAN:  Tamanna Senior was seen today for hematoma, tremors and diarrhea. Diagnoses and all orders for this visit:    Hematoma    Tremor    Chronic renal failure, stage 3 (moderate), unspecified whether stage 3a or 3b CKD    Diarrhea, unspecified type    Other orders  -     topiramate (TOPAMAX) 50 MG tablet; Take 2 tablets by mouth 2 times daily  -     loperamide (RA ANTI-DIARRHEAL) 2 MG capsule; Take 1 capsule by mouth daily  -     aspirin EC 81 MG EC tablet; Take 1 tablet by mouth daily  -     metoprolol succinate (TOPROL XL) 25 MG extended release tablet; Take 1 tablet by mouth 2 times daily    OARRS report checked      Hematoma clinically appears to be stable and I recommended she start a baby aspirin every day. I advised patient and daughter that the hematoma may take several months to completely resolve. No restrictions or activities. The tremor certainly clinically has significantly worsened and this may be related to the lower dose of the metoprolol medication. She not have any orthostatic hypotensive episodes we will go ahead and increase Topamax back to 100 mg twice a day and will need to have a kidney function tested in the next month. Patient does have appoint with nephrology at that time. The diarrhea may indeed just be irritable bowel syndrome and encourage patient continue with a high-fiber diet and avoid fatty stuff in her diet and begin a trial of Imodium. If she does not improve certainly cholestyramine will be the next step. Keep RTC appointment in March    Medical decision making of moderate complexity. No follow-ups on file. Please note that this chart was generated using Dragon dictation software. Although every effort was made to ensure the accuracy of this automated transcription, some errors in transcription may have occurred. An  electronic signature was used to authenticate this note. --Phuong Reyes MD on 1/4/2021 at 5:12 PM        Pursuant to the emergency declaration under the [facility], [facility] waiver authority and the [employer] Act, this Virtual  Visit was conducted, with patient's consent, to reduce the patient's risk of exposure to COVID-19 and provide continuity of care for an established patient. Services were provided through a video synchronous discussion virtually to substitute for in-person clinic visit.

## 2021-01-11 RX ORDER — TOPIRAMATE 50 MG/1
100 TABLET, FILM COATED ORAL 2 TIMES DAILY
Qty: 120 TABLET | Refills: 2 | Status: SHIPPED | OUTPATIENT
Start: 2021-01-11 | End: 2021-03-09 | Stop reason: DRUGHIGH

## 2021-01-11 RX ORDER — METOPROLOL SUCCINATE 25 MG/1
25 TABLET, EXTENDED RELEASE ORAL 2 TIMES DAILY
Qty: 60 TABLET | Refills: 2 | Status: SHIPPED | OUTPATIENT
Start: 2021-01-11 | End: 2021-06-04

## 2021-01-12 ENCOUNTER — APPOINTMENT (OUTPATIENT)
Dept: CT IMAGING | Age: 81
End: 2021-01-12
Payer: MEDICARE

## 2021-01-12 ENCOUNTER — HOSPITAL ENCOUNTER (EMERGENCY)
Age: 81
Discharge: HOME | End: 2021-01-13
Attending: EMERGENCY MEDICINE
Payer: MEDICARE

## 2021-01-12 VITALS
RESPIRATION RATE: 23 BRPM | DIASTOLIC BLOOD PRESSURE: 43 MMHG | BODY MASS INDEX: 22.15 KG/M2 | OXYGEN SATURATION: 98 % | WEIGHT: 125 LBS | TEMPERATURE: 97.9 F | HEIGHT: 63 IN | SYSTOLIC BLOOD PRESSURE: 119 MMHG | HEART RATE: 75 BPM

## 2021-01-12 DIAGNOSIS — M54.2 NECK PAIN: Primary | ICD-10-CM
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
## 2021-01-12 PROCEDURE — 72125 CT NECK SPINE W/O DYE: CPT

## 2021-01-12 PROCEDURE — 93005 ELECTROCARDIOGRAM TRACING: CPT | Performed by: EMERGENCY MEDICINE

## 2021-01-12 PROCEDURE — 70450 CT HEAD/BRAIN W/O DYE: CPT

## 2021-01-12 PROCEDURE — 99284 EMERGENCY DEPT VISIT MOD MDM: CPT

## 2021-01-12 RX ORDER — RIVAROXABAN 10 MG/1
13 TABLET, FILM COATED ORAL
COMMUNITY
End: 2021-05-14

## 2021-01-12 ASSESSMENT — ENCOUNTER SYMPTOMS
NAUSEA: 0
VOMITING: 0
SHORTNESS OF BREATH: 0
ABDOMINAL PAIN: 0

## 2021-01-12 ASSESSMENT — PAIN DESCRIPTION - PAIN TYPE: TYPE: ACUTE PAIN

## 2021-01-13 LAB
EKG ATRIAL RATE: 50 BPM
EKG DIAGNOSIS: NORMAL
EKG P-R INTERVAL: 232 MS
EKG Q-T INTERVAL: 450 MS
EKG QRS DURATION: 146 MS
EKG QTC CALCULATION (BAZETT): 522 MS
EKG R AXIS: 89 DEGREES
EKG T AXIS: -84 DEGREES
EKG VENTRICULAR RATE: 81 BPM

## 2021-01-13 PROCEDURE — 93010 ELECTROCARDIOGRAM REPORT: CPT | Performed by: INTERNAL MEDICINE

## 2021-01-13 NOTE — ED NOTES
Patient discharged at this time in no acute distress after verbalizing understanding of discharge instructions and need for follow up with PCP .   Pt left via wheelchair to discharge area after reviewing and receiving copy of ov AVS.   Patient education  Learner- Patient   Motivation and readiness to learn- Medium to High  Barriers to learning- None  Learning preference/provided instructions- Both written and verbal discharge instructions     Lady Gaurav RN  01/13/21 0045

## 2021-01-13 NOTE — ED PROVIDER NOTES
I independently performed a history and physical on Staci Menezes. All diagnostic, treatment, and disposition decisions were made by myself in conjunction with the advanced practice provider. Briefly, this is a [de-identified] y.o. female here for fall. Patient states that her driveway is not level. When she got out of the car she lost her balance falling onto her back. She currently complains of pain to the back of the neck. She is anticoagulated. About a month ago she had a fall resulting in kidney hemorrhage. She still has swelling to the right back but she currently does not have any pain. She follows up with her PCP and her nephrologist regularly. She has an appointment with her PCP on Monday. She does not have any back pain. On exam, nontoxic-appearing adult female in no acute distress. No facial asymmetry, no dysarthria, extraocular muscles intact, no facial trauma. No trauma to the head. She has tenderness to the left paraspinal musculature of the cervical spine. And mild T1 tenderness. High-sensitivity neurological exam of the upper and lower extremities including otherwise in strength at the fingers wrist elbow shoulders knees hips ankles normal.  Normal sensation to soft touch no ataxia. No obvious nystagmus. Tenderness but no edema to the lateral aspect of the right knee but this is from her previous fall. She has induration and bruising to the right flank and back also from her previous fall. No tenderness. Patient has tenderness to the left neck with lateral movement to the left. EKG  The Ekg interpreted by me in the absence of a cardiologist shows:  Demand paced rhythm at a rate of 81 bpm, prolonged AZ and QRS and QTc. No acute ischemic changes. The paced rhythm appears new when compared to her previous EKG in November 2020. Screenings            Critical Time  None       MDM  Elderly female with a mechanical fall.   CT head and CT cervical spine obtained due to 1 she is anticoagulation to she has neck pain. No acute traumatic findings found on the CT of the head and neck. She does have chronic degenerative disease of the spine. Patient does not have any midline tenderness no neurological deficits no distracting injuries no altered mental status and she is not intoxicated. I did remove the cervical collar however the patient is unable to fully move to the left without discomfort no c-collar is placed. Patient has a pacemaker and is unable to obtain an MRI. Patient is placed in a soft collar patient is counseled on the importance of keeping the cervical collar in place. Close follow-up with neurosurgery is recommended for further evaluation. Patient Referrals:  Matthieu Steward MD  [address] [place] [id number] [phone]    Call in 1 day  for an appointment for follow-up    [facility] Emergency Department  [address] [place] [address]  [phone]    If symptoms worsen      Discharge Medications:  Discharge Medication List as of 1/12/2021 11:43 PM          FINAL IMPRESSION  1. Neck pain    2. Fall from slip, trip, or stumble, initial encounter        Blood pressure (!) 119/43, pulse 75, temperature 97.9 °F (36.6 °C), temperature source Oral, resp. rate 23, height 5' 3\" (1.6 m), weight 125 lb (56.7 kg), SpO2 98 %, not currently breastfeeding. For further details of [facility] emergency department encounter, please see documentation by advanced practice provider.       Yelena Steven MD  01/13/21 0862

## 2021-01-13 NOTE — ED PROVIDER NOTES
[facility]        Pt Name: Amparo Almazan  MRN: [id number]  [place] 1940  Date of evaluation: 1/12/2021  Provider: Dagoberto Dooley PA-C  PCP: Natalee Sanchez MD     I have seen and evaluated this patient with my supervising physician Britt Enamorado MD.    [facility]       Chief Complaint   Patient presents with    Fall     pt brought in by ems from home. pt getting out of car, felt dizzy and fell and hit back of head. no lac, had bumps on neck. placed in c-collar. on xarelto. A/O x4. c/o neck pain. HISTORY OF PRESENT ILLNESS   (Location, Timing/Onset, Context/Setting, Quality, Duration, Modifying Factors, Severity, Associated Signs and Symptoms)  Note limiting factors. Amparo Almazan is a [de-identified] y.o. female patient presents emergency department for evaluation of fall. Patient states she was trying to get in a car which was on a sloped driveway when she felt off balance falling backward striking her head onto the ground. Patient is on Xarelto. Patient denies any loss of consciousness. Patient denies any headache or blurred vision at this time. Patient states the back of her head is a little sore and that her neck is also a little sore. Patient arrived by EMS in a c-collar. Patient states she has been feeling off balance for about 3 months. Patient also had a fall recently which resulted in a kidney hematoma. Patient denies any chest pain, abdominal pain, nausea vomiting diarrhea today. Nursing Notes were all reviewed and agreed with or any disagreements were addressed in the HPI. REVIEW OF SYSTEMS    (2-9 systems for level 4, 10 or more for level 5)     Review of Systems   Constitutional: Negative for fatigue and fever. HENT: Negative. Eyes: Negative for visual disturbance. Respiratory: Negative for shortness of breath. Cardiovascular: Negative for chest pain.    Gastrointestinal: Negative for abdominal pain, nausea and vomiting. Genitourinary: Negative. Musculoskeletal: Positive for neck pain. Skin: Negative. Neurological: Negative. Positives and Pertinent negatives as per HPI. Except as noted above in the ROS, all other systems were reviewed and negative.        PAST MEDICAL HISTORY     Past Medical History:   Diagnosis Date    Allergic rhinitis, cause unspecified     Arthritis     Atrial fibrillation ([address]     Bronchopneumonia     CAD (coronary artery disease)     stent:  post cataract surgery (CABG)    Cerebral artery occlusion with cerebral infarction ([address]     TIA    Chronic gouty arthropathy     Chronic kidney disease     Congestive heart failure, unspecified     Degeneration of cervical intervertebral disc     Essential and other specified forms of tremor     Gout     HIGH CHOLESTEROL     History of CVA (cerebrovascular accident)     Hx of blood clots     Hypertension     Influenza 12/23/2017    Mitral valve stenosis and aortic valve insufficiency     Movement disorder     back problems    Pacemaker     Peptic ulcer, unspecified site, unspecified as acute or chronic, without mention of hemorrhage, perforation, or obstruction     Thyroid disease     Unspecified disorder of kidney and ureter     Unspecified hypothyroidism          SURGICAL HISTORY     Past Surgical History:   Procedure Laterality Date    BACK SURGERY      CARDIAC CATHETERIZATION  7/2012    CARDIAC CATHETERIZATION  08/07/2018    Unsuccesful  of RCA    CARDIAC SURGERY      CABG & Cardiac ablation    CHOLECYSTECTOMY      CORONARY ARTERY BYPASS GRAFT  1987    LIMA- Diag/LAD, SVG- RCA    FEMORAL BYPASS Left 8/22/2019    LEFT FEMORAL TO POPLITEAL BYPASS GRAFT performed by Zander Baxter MD at [facility] FEMORAL-FEMORAL BYPASS GRAFT N/A 8/22/2019    FEMORAL TO FEMORAL BYPASS performed by Zander Baxter MD at [facility] Left 03/16/2017    Left hip pinning    JOINT REPLACEMENT      DE NJX AA&/STRD TFRML EPI LUMBAR/SACRAL 1 LEVEL Right 12/3/2018    RIGHT L3 AND L4 LUMBAR TRANSFORAMINAL EPIRUAL STEROID INJECTION WITH FLUOROSCOPY performed by Roshan Ramirez MD at [facility]  11/04/2014    MARLENY - 3.0 x 28 to the [address] [place] [address] Road      left         [address]       Discharge Medication List as of 1/12/2021 11:43 PM      CONTINUE these medications which have NOT CHANGED    Details   rivaroxaban (XARELTO) 10 MG TABS tablet Take 13 mg by mouthHistorical Med      metoprolol succinate (TOPROL XL) 25 MG extended release tablet Take 1 tablet by mouth 2 times daily, Disp-60 tablet, R-2Normal      aspirin EC 81 MG EC tablet Take 1 tablet by mouth daily, Disp-90 tablet, R-1NO PRINT      lidocaine (LIDODERM) 5 % Place 1 patch onto the skin every 24 hoursHistorical Med      DULoxetine (CYMBALTA) 60 MG extended release capsule Take 1 capsule by mouth daily, Disp-90 capsule, R-1Normal      allopurinol (ZYLOPRIM) 100 MG tablet Take 1 tablet by mouth daily, Disp-90 tablet,R-1Pt takes with the 300mg tabsNormal      levothyroxine (SYNTHROID) 112 MCG tablet TAKE 1 TABLET EVERY DAY, Disp-90 tablet,R-1Normal      topiramate (TOPAMAX) 50 MG tablet Take 2 tablets by mouth 2 times daily, Disp-120 tablet, R-2Normal      senna-docusate (PERICOLACE) 8.6-50 MG per tablet Take 1 tablet by mouth daily as neededHistorical Med      tolterodine (DETROL LA) 2 MG extended release capsule Take 1 capsule by mouth daily, Disp-30 capsule, R-5Normal      spironolactone (ALDACTONE) 25 MG tablet Take 0.5 tablets by mouth three times a week, Disp-30 tablet,R-0Normal      vitamin D (ERGOCALCIFEROL) 1.25 MG (70261 UT) CAPS capsule Take 1 capsule by mouth once a week, Disp-12 capsule,R-3NO PRINT      torsemide (DEMADEX) 10 MG tablet TAKE 1 TABLET EVERY OTHER DAY ALTERNATING WITH  2  TABLETS EVERY OTHER DAY, Disp-135 tablet,R-0Normal               ALLERGIES     Aspirin, Ativan [lorazepam], discharge. Conjunctiva/sclera: Conjunctivae normal.      Pupils: Pupils are equal, round, and reactive to light. Neck:      Musculoskeletal: Normal range of motion and neck supple. Cardiovascular:      Rate and Rhythm: Normal rate and regular rhythm. Heart sounds: Normal heart sounds. No murmur. No gallop. Pulmonary:      Effort: Pulmonary effort is normal. No respiratory distress. Breath sounds: Normal breath sounds. No wheezing, rhonchi or rales. Abdominal:      General: Abdomen is flat. Bowel sounds are normal.      Tenderness: There is no abdominal tenderness. There is no guarding or rebound. Musculoskeletal: Normal range of motion. Cervical back: She exhibits bony tenderness. Thoracic back: Normal.      Lumbar back: Normal.   Skin:     General: Skin is warm and dry. Capillary Refill: Capillary refill takes less than 2 seconds. Coloration: Skin is not pale. Neurological:      General: No focal deficit present. Mental Status: She is alert and oriented to person, place, and time. Psychiatric:         Mood and Affect: Mood normal.         Behavior: Behavior normal.         DIAGNOSTIC RESULTS   LABS:    Labs Reviewed - No data to display    All other labs were within normal range or not returned as of this dictation. EKG: All EKG's are interpreted by the Emergency Department Physician in the absence of a cardiologist.  Please see their note for interpretation of EKG. RADIOLOGY:   Non-plain film images such as CT, Ultrasound and MRI are read by the radiologist. Plain radiographic images are visualized and preliminarily interpreted by the ED Provider with the below findings:        Interpretation per the Radiologist below, if available at the time of this note:    CT CERVICAL SPINE WO CONTRAST   Final Result   Moderate degenerative disc changes throughout the lower cervical spine with   no acute abnormality seen.       Moderate disc osteophyte complexes at C5-6 and C6-7. Recommend clinical   follow-up. Moderate osteoarthritic changes of the facets throughout. CT Head WO Contrast   Final Result   Small old cortical infarct along the right frontoparietal region and left   cerebellar hemisphere which is unchanged. Diffuse mild atrophy and mild chronic microischemic disease throughout the   deep white matter which is unchanged with no acute abnormality seen. Ct Head Wo Contrast    Result Date: 1/12/2021  EXAMINATION: CT OF THE HEAD WITHOUT CONTRAST  1/12/2021 10:13 pm TECHNIQUE: CT of the head was performed without the administration of intravenous contrast. Dose modulation, iterative reconstruction, and/or weight based adjustment of the mA/kV was utilized to reduce the radiation dose to as low as reasonably achievable. COMPARISON: 12/16/2020 HISTORY: ORDERING SYSTEM PROVIDED HISTORY: fall, on xarelto TECHNOLOGIST PROVIDED HISTORY: Reason for exam:->fall, on xarelto Has a \"code stroke\" or \"stroke alert\" been called? ->No Reason for Exam: fall, on xarelto Acuity: Acute Type of Exam: Initial Relevant Medical/Surgical History: Fall (pt brought in by ems from home. pt getting out of car, felt dizzy and fell and hit back of head. no lac, had bumps on neck. placed in c-collar. on xarelto. A/O x4. c/o neck pain. ) FINDINGS: BRAIN/VENTRICLES: The ventricles are mildly enlarged and there is diffuse mild prominence of the cortical sulci which is unchanged. There is mild periventricular low density bilaterally which is unchanged. There is a small subcortical area of encephalomalacia along the right frontoparietal region which is unchanged. There is tiny old cortical infarct along the left cerebellar hemisphere which is unchanged. ORBITS: The visualized portion of the orbits demonstrate no acute abnormality. SINUSES: The visualized paranasal sinuses and mastoid air cells demonstrate no acute abnormality.  SOFT TISSUES/SKULL:  No acute abnormality of the visualized skull or soft tissues. Small old cortical infarct along the right frontoparietal region and left cerebellar hemisphere which is unchanged. Diffuse mild atrophy and mild chronic microischemic disease throughout the deep white matter which is unchanged with no acute abnormality seen. Ct Cervical Spine Wo Contrast    Result Date: 1/12/2021  EXAMINATION: CT OF THE CERVICAL SPINE WITHOUT CONTRAST 1/12/2021 10:15 pm TECHNIQUE: CT of the cervical spine was performed without the administration of intravenous contrast. Multiplanar reformatted images are provided for review. Dose modulation, iterative reconstruction, and/or weight based adjustment of the mA/kV was utilized to reduce the radiation dose to as low as reasonably achievable. COMPARISON: None. HISTORY: ORDERING SYSTEM PROVIDED HISTORY: fall, on xarelto, neck pain TECHNOLOGIST PROVIDED HISTORY: Reason for exam:->fall, on xarelto, neck pain Reason for Exam: fall, on xarelto, neck pain Acuity: Acute Type of Exam: Initial Relevant Medical/Surgical History: Fall (pt brought in by ems from home. pt getting out of car, felt dizzy and fell and hit back of head. no lac, had bumps on neck. placed in c-collar. on xarelto. A/O x4. c/o neck pain. ) FINDINGS: BONES/ALIGNMENT: The cervical vertebra are well aligned. There is moderate disc space narrowing throughout the lower cervical spine with prominent osteophytes throughout which is most severe at C5-6 and C6-7. No fracture or subluxation is seen. There are sclerotic changes of the facets throughout. The atlantoaxial joint is intact. DEGENERATIVE CHANGES: There are moderate disc osteophyte complex at C5-6 and C6-7 causing moderate dural sac compression anteriorly. SOFT TISSUES: There is no prevertebral soft tissue swelling. The lung apices are clear. Moderate degenerative disc changes throughout the lower cervical spine with no acute abnormality seen.  Moderate disc osteophyte DISPOSITION Decision To Discharge 01/12/2021 11:30:11 PM      PATIENT REFERREDTO:  Kyra Martinez MD  [address]  [facility] [address] [phone]    Call in 1 day  for an appointment for follow-up    [facility] Emergency Department  [address] [facility]  [phone]    If symptoms worsen      DISCHARGE MEDICATIONS:  Discharge Medication List as of 1/12/2021 11:43 PM          DISCONTINUED MEDICATIONS:  Discharge Medication List as of 1/12/2021 11:43 PM      STOP taking these medications       loperamide (RA ANTI-DIARRHEAL) 2 MG capsule Comments:   Reason for Stopping:                      (Please note that portions of this note were completed with a voice recognition program.  Efforts were made to edit the dictations but occasionally words are mis-transcribed.)    Patricia Jimenez PA-C (electronically signed)            Patricia Jimenez PA-C  01/13/21 1921

## 2021-01-22 DIAGNOSIS — M15.9 PRIMARY OSTEOARTHRITIS INVOLVING MULTIPLE JOINTS: ICD-10-CM

## 2021-01-22 RX ORDER — HYDROCODONE BITARTRATE AND ACETAMINOPHEN 5; 325 MG/1; MG/1
1 TABLET ORAL 4 TIMES DAILY PRN
Qty: 120 TABLET | Refills: 0 | Status: SHIPPED | OUTPATIENT
Start: 2021-01-22 | End: 2021-06-01 | Stop reason: SDUPTHER

## 2021-01-22 NOTE — TELEPHONE ENCOUNTER
Medication:   Requested Prescriptions     Pending Prescriptions Disp Refills    HYDROcodone-acetaminophen (NORCO) 5-325 MG per tablet 120 tablet 0     Sig: Take 1 tablet by mouth 4 times daily as needed for Pain for up to 30 days. Last Filled: 9/18/2020    Patient Phone Number: [phone] (home)     Last appt: 1/4/2021   Next appt: 3/9/2021    Last OARRS:   RX Monitoring 4/18/2019   Attestation The Prescription Monitoring Report for this patient was reviewed today.      PDMP Monitoring:    Last PDMP Desmond Menchaca as Reviewed [facility]):  Review User Review Instant Review Result   Maya Lux 9/18/2020  2:06 PM Reviewed PDMP [1]     Preferred Pharmacy:   Phyllis Mabry [address] [address] [phone] Aditi Colon [phone]  [address] [employer] [address]  Phone: [phone] Fax: [facility] [phone] - F [phone]  [address] [place] [address]  Phone: [phone] Fax: [phone]

## 2021-01-22 NOTE — TELEPHONE ENCOUNTER
She called to state she needs her pain medication.   I am not sure which one is her pain medication            Aashish in chart

## 2021-01-25 ENCOUNTER — HOSPITAL ENCOUNTER (OUTPATIENT)
Dept: CARDIAC CATH/INVASIVE PROCEDURES | Age: 81
Discharge: HOME | End: 2021-01-25
Attending: INTERNAL MEDICINE | Admitting: INTERNAL MEDICINE
Payer: MEDICARE

## 2021-01-25 ENCOUNTER — APPOINTMENT (OUTPATIENT)
Dept: GENERAL RADIOLOGY | Age: 81
End: 2021-01-25
Attending: INTERNAL MEDICINE
Payer: MEDICARE

## 2021-01-25 VITALS
BODY MASS INDEX: 22.15 KG/M2 | OXYGEN SATURATION: 98 % | TEMPERATURE: 98 F | HEIGHT: 63 IN | SYSTOLIC BLOOD PRESSURE: 152 MMHG | DIASTOLIC BLOOD PRESSURE: 67 MMHG | WEIGHT: 125 LBS | HEART RATE: 78 BPM

## 2021-01-25 LAB
ANION GAP SERPL CALCULATED.3IONS-SCNC: 9 MMOL/L (ref 3–16)
BASOPHILS ABSOLUTE: 0.2 K/UL (ref 0–0.2)
BASOPHILS RELATIVE PERCENT: 1.3 %
BUN BLDV-MCNC: 39 MG/DL (ref 7–20)
CALCIUM SERPL-MCNC: 9.2 MG/DL (ref 8.3–10.6)
CHLORIDE BLD-SCNC: 110 MMOL/L (ref 99–110)
CO2: 23 MMOL/L (ref 21–32)
CREAT SERPL-MCNC: 1.5 MG/DL (ref 0.6–1.2)
EOSINOPHILS ABSOLUTE: 0.5 K/UL (ref 0–0.6)
EOSINOPHILS RELATIVE PERCENT: 4 %
GFR AFRICAN AMERICAN: 40
GFR NON-AFRICAN AMERICAN: 33
GLUCOSE BLD-MCNC: 102 MG/DL (ref 70–99)
HCT VFR BLD CALC: 42.8 % (ref 36–48)
HEMOGLOBIN: 13 G/DL (ref 12–16)
LYMPHOCYTES ABSOLUTE: 0.9 K/UL (ref 1–5.1)
LYMPHOCYTES RELATIVE PERCENT: 7.2 %
MCH RBC QN AUTO: 27.5 PG (ref 26–34)
MCHC RBC AUTO-ENTMCNC: 30.4 G/DL (ref 31–36)
MCV RBC AUTO: 90.7 FL (ref 80–100)
MONOCYTES ABSOLUTE: 0.7 K/UL (ref 0–1.3)
MONOCYTES RELATIVE PERCENT: 5.4 %
NEUTROPHILS ABSOLUTE: 10.7 K/UL (ref 1.7–7.7)
NEUTROPHILS RELATIVE PERCENT: 82.1 %
PDW BLD-RTO: 19.3 % (ref 12.4–15.4)
PLATELET # BLD: 424 K/UL (ref 135–450)
PMV BLD AUTO: 9.2 FL (ref 5–10.5)
POTASSIUM SERPL-SCNC: 3.8 MMOL/L (ref 3.5–5.1)
RBC # BLD: 4.71 M/UL (ref 4–5.2)
SODIUM BLD-SCNC: 142 MMOL/L (ref 136–145)
WBC # BLD: 13 K/UL (ref 4–11)

## 2021-01-25 PROCEDURE — 80048 BASIC METABOLIC PNL TOTAL CA: CPT

## 2021-01-25 PROCEDURE — 33206 INSERT HEART PM ATRIAL: CPT | Performed by: INTERNAL MEDICINE

## 2021-01-25 PROCEDURE — 93005 ELECTROCARDIOGRAM TRACING: CPT | Performed by: INTERNAL MEDICINE

## 2021-01-25 PROCEDURE — 2500000003 HC RX 250 WO HCPCS

## 2021-01-25 PROCEDURE — 99153 MOD SED SAME PHYS/QHP EA: CPT

## 2021-01-25 PROCEDURE — C1785 PMKR, DUAL, RATE-RESP: HCPCS

## 2021-01-25 PROCEDURE — C1894 INTRO/SHEATH, NON-LASER: HCPCS

## 2021-01-25 PROCEDURE — 36415 COLL VENOUS BLD VENIPUNCTURE: CPT

## 2021-01-25 PROCEDURE — 33233 REMOVAL OF PM GENERATOR: CPT | Performed by: INTERNAL MEDICINE

## 2021-01-25 PROCEDURE — 33206 INSERT HEART PM ATRIAL: CPT

## 2021-01-25 PROCEDURE — 99152 MOD SED SAME PHYS/QHP 5/>YRS: CPT | Performed by: INTERNAL MEDICINE

## 2021-01-25 PROCEDURE — 6360000004 HC RX CONTRAST MEDICATION: Performed by: INTERNAL MEDICINE

## 2021-01-25 PROCEDURE — 71045 X-RAY EXAM CHEST 1 VIEW: CPT

## 2021-01-25 PROCEDURE — 6360000002 HC RX W HCPCS

## 2021-01-25 PROCEDURE — 99152 MOD SED SAME PHYS/QHP 5/>YRS: CPT

## 2021-01-25 PROCEDURE — 2780000010 HC IMPLANT OTHER

## 2021-01-25 PROCEDURE — C1898 LEAD, PMKR, OTHER THAN TRANS: HCPCS

## 2021-01-25 PROCEDURE — 2580000003 HC RX 258

## 2021-01-25 PROCEDURE — 33233 REMOVAL OF PM GENERATOR: CPT

## 2021-01-25 PROCEDURE — 85025 COMPLETE CBC W/AUTO DIFF WBC: CPT

## 2021-01-25 RX ORDER — IODIXANOL 320 MG/ML
10 INJECTION, SOLUTION INTRAVASCULAR
Status: COMPLETED | OUTPATIENT
Start: 2021-01-25 | End: 2021-01-25

## 2021-01-25 RX ADMIN — IODIXANOL 10 ML: 320 INJECTION, SOLUTION INTRAVASCULAR at 09:37

## 2021-01-25 NOTE — H&P
[address]   Electrophysiology   Date: 1/25/2021  I had the privilege of visiting Adebayo Radford in the office. CC: Palpitation    HPI: Adebayo Radford is a [de-identified] y.o. history of Atrial fib and underwent cardioversion in 2008. She had recurrent Atrial fib and underwent RFCA on 4/24/09 by Dr. Maite Gonzales. Amiodarone therapy was discussed with patient in 2014. Underwent dual chamber pacemaker ([employer]) on 11/8/13 for sick sinus syndrome , and AV block reported at [facility] 1490.      She has significant coronary disease with history of CABG with multiple interventions in the past. On 2/2/17 she had a [facility] which showed chronic occlusion of LAD and has seen interventional cardiology for potential intervention. PFTs showed severe obstructive disease, breathing improved after administering inhaler. She was seen on 8/2/17 and reported having palpitations and chest pain for one month. ECG was done which showed atrial flutter rate in the 140's. She is anticoagulated with Xarelto 15 mg daily. She underwent cardioversion on 8/2/17.       Pacemaker interrogation AP 76%   5% 9 months remaining battery     She had a PFT that showed severe restrictive disease and she has not followed up with pulmonology. We encouraged her to see Claudio Martinez. She was a previous smoker with suspected COPD and amiodarone is not an option for rate control    She does notice her heart races occasionally but only lasts for a few seconds. Jaz Toledo complains of being short of breath at times but attributes this to her heart failure. She is followed by the heart failure team.     Her device battery is now depleted and she needs battery change out.      Past Medical History:   Diagnosis Date    Allergic rhinitis, cause unspecified     Arthritis     Atrial fibrillation ([facility] [address]     Bronchopneumonia     CAD (coronary artery disease)     stent:  post cataract surgery (CABG)    Cerebral artery occlusion with cerebral infarction [facility])     TIA    Chronic gouty arthropathy     Chronic kidney disease     Congestive heart failure, unspecified     Degeneration of cervical intervertebral disc     Essential and other specified forms of tremor     Gout     HIGH CHOLESTEROL     History of CVA (cerebrovascular accident)     Hx of blood clots     Hypertension     Influenza 12/23/2017    Mitral valve stenosis and aortic valve insufficiency     Movement disorder     back problems    Pacemaker     Peptic ulcer, unspecified site, unspecified as acute or chronic, without mention of hemorrhage, perforation, or obstruction     Thyroid disease     Unspecified disorder of kidney and ureter     Unspecified hypothyroidism         Past Surgical History:   Procedure Laterality Date    BACK SURGERY      CARDIAC CATHETERIZATION  7/2012    CARDIAC CATHETERIZATION  08/07/2018    Unsuccesful  of RCA    CARDIAC SURGERY      CABG & Cardiac ablation    CHOLECYSTECTOMY      CORONARY ARTERY BYPASS GRAFT  1987    LIMA- Diag/LAD, SVG- RCA    FEMORAL BYPASS Left 8/22/2019    LEFT FEMORAL TO POPLITEAL BYPASS GRAFT performed by Zander Baxter MD at [facility] FEMORAL-FEMORAL BYPASS GRAFT N/A 8/22/2019    FEMORAL TO FEMORAL BYPASS performed by Zander Baxter MD at [address] [place] [address] Left 03/16/2017    Left hip pinning    JOINT REPLACEMENT      NM NJX AA&/STRD TFRML EPI LUMBAR/SACRAL 1 LEVEL Right 12/3/2018    RIGHT L3 AND L4 LUMBAR TRANSFORAMINAL EPIRUAL STEROID INJECTION WITH FLUOROSCOPY performed by Nicolle Jackson MD at [facility]CA  11/04/2014    MARLENY - 3.0 x 28 to the Ist Diag    SHOULDER SURGERY      left       Allergies   Allergen Reactions    Aspirin Nausea Only    Ativan [Lorazepam] Other (See Comments)     hallucinations    Diltiazem Anaphylaxis    Sulfa Antibiotics Rash and Hives    Dabigatran Nausea Only     And indigestion  And indigestion    Aka Pradaxa    Lipitor [Atorvastatin] Other (See Comments)     Muscle pains    Mysoline [Primidone]     Nsaids     Other Other (See Comments)     Nitroglycerin patches causes severe headaches       Social History:  Reviewed. reports that she quit smoking about 34 years ago. Her smoking use included cigarettes. She has a 28.00 pack-year smoking history. She has never used smokeless tobacco. She reports previous alcohol use. She reports that she does not use drugs. Family History:  Reviewed. family history includes Cancer in her maternal grandmother, paternal grandmother, and sister; Diabetes in her brother and maternal uncle; Heart Disease in her brother, maternal aunt, and sister; Hypertension in her brother and paternal aunt; Stroke in her maternal aunt. Review of System:  All other systems reviewed Pertinent negatives and positives are:     General: - fever, - chills + Fatigue   Ophthalmic ROS: - eye pain or loss of vision  ENT - headaches, - sore throat   Respiratory: - cough, - sputum + shortness of breath with activity  Cardiovascular: Reviewed in HPI  Gastrointestinal: - abdominal pain, - diarrhea, - N/V   Hematology: - bleeding, - blood clots, - bruising - jaundice     Genito-Urinary:  - Dysuria or incontinence    Musculoskeletal: - Joint swelling, - muscle pain    Neurological: - confusion, - dizziness, - headaches   Psychiatric: - anxiety, - depression   Dermatological: - rash      Physical Examination:     Vitals:    01/25/21 0713   BP: (!) 152/67   Pulse: 78   Temp: 98 °F (36.7 °C)   SpO2: 98%       Wt Readings from Last 3 Encounters:   01/25/21 125 lb (56.7 kg)   01/12/21 125 lb (56.7 kg)   12/23/20 131 lb (59.4 kg)     · Constitutional: Oriented. No distress. · Head: Normocephalic and atraumatic. · Mouth/Throat: Oropharynx is clear and moist.   · Eyes: Conjunctivae normal. EOM are normal.   · Neck: Neck supple. No JVD present. · Cardiovascular: Normal rate, regular rhythm, S1&S2.     · Pulmonary/Chest: Bilateral respiratory XL) 25 MG extended release tablet Take 1 tablet by mouth 2 times daily 1/11/21  Yes Lindsey Campo MD   topiramate (TOPAMAX) 50 MG tablet Take 2 tablets by mouth 2 times daily 1/11/21  Yes Lindsey Campo MD   aspirin EC 81 MG EC tablet Take 1 tablet by mouth daily 1/4/21  Yes Lindsey Campo MD   lidocaine (LIDODERM) 5 % Place 1 patch onto the skin every 24 hours 12/18/20  Yes Historical Provider, MD   DULoxetine (CYMBALTA) 60 MG extended release capsule Take 1 capsule by mouth daily 12/8/20  Yes Lindsey Campo MD   allopurinol (ZYLOPRIM) 100 MG tablet Take 1 tablet by mouth daily 12/4/20  Yes Lindsey Campo MD   spironolactone (ALDACTONE) 25 MG tablet Take 0.5 tablets by mouth three times a week 11/13/20  Yes TIFFANIE Michel - CNS   vitamin D (ERGOCALCIFEROL) 1.25 MG (38607 UT) CAPS capsule Take 1 capsule by mouth once a week 11/9/20  Yes Lindsey Campo MD   torsemide (DEMADEX) 10 MG tablet TAKE 1 TABLET EVERY OTHER DAY ALTERNATING WITH  2  TABLETS EVERY OTHER DAY 10/14/20  Yes TIFFANIE Mckee - CNS   levothyroxine (SYNTHROID) 112 MCG tablet TAKE 1 TABLET EVERY DAY 9/8/20  Yes Lindsey Campo MD   rivaroxaban (XARELTO) 10 MG TABS tablet Take 13 mg by mouth    Historical Provider, MD   senna-docusate (Layton Raisin) 8.6-50 MG per tablet Take 1 tablet by mouth daily as needed 12/17/20   Historical Provider, MD   tolterodine (DETROL LA) 2 MG extended release capsule Take 1 capsule by mouth daily 12/21/20   Lindsey Campo MD         Assessment and plan:     - Recurrent atrial flutter/tachcyardia/fibrillation     Short episodes of Atrial tachycardia on device interrogation today   Antiarrhythmic therapy is limited since she has extensive structural heart disease. Amiodarone is not indicated because she has severe abnormal PFT. Device interrogation with brief episodes of ATR. She is not interested in ablation.      S/p DCCV on 8/2/17   S/p RFCA for A fib at [facility] by Dr. Sekou Franks in 2009  On Metoprolol 100mg BID   She takes extra dose of metoprolol for episodes of rapid heartbeat  High [id number] score and high risk for stroke and thromboembolism. Continue with Xarelto 15 mg per day. No complaints of bleeding  Echo 7/19/19 EF 45%      -SOB/Severe COPD   Has not seen     PFT showed severe restrictive lung disease    Echo 7/19/19 EF 45%    - Bradycardia sinus node dysfunction:    S/p pacemaker implantation.       -Pacemaker              The CIED was interrogated and programmed and I supervised and reviewed all the data. All findings and changes are in device interrogation sheat and reflect my personal interpretation and changes and is scanned to [email or web address]. Pacemaker interrogation AP 76%   5% 9 months remaining battery     - CAD               Stable. On Plavix and metoprolol and rosuvastatin. She used to follow-up with Dr. Tolu Guy. chronic total occlusion () of dominant prox RCA filled by right-to-right epicardial collaterals. Her  is amenable to PCI with antegrade approach. Will schedule her nuclear study to determine viability in RCA distribution. She is on dual antianginal therapy. If RCA distribution is viable, will schedule her for PCI of the RCA . She is on Plavix for CAD. She is not on Asa as she is on Xarelto for atrial fibrillation. Follow up with interventional cardiology,      S/p cardiac cath 5/20/19      -HTN   Vitals:   04/21/20 1350  BP: 137/72  Pulse: 69  Resp:    Home BP monitoring encourage with a BP goal <130/80   States BP well controlled at home    -Chronic systolic CHF   Followed by Heart failure   Echo 7/19/19 EF 45%    6 month follow up with NPAL  Continue current medication     Thank you for allowing me to participate in the care of Cayetano Slaughter. Further evaluation will be based upon the patient's clinical course and testing results. All questions and concerns were addressed to the patient/family.  Alternatives to my treatment were discussed. I have discussed the above stated plan and the patient verbalized understanding and agreed with the plan. NOTE: This report was transcribed using voice recognition software. Every effort was made to ensure accuracy, however, inadvertent computerized transcription errors may be present. Cedric Stock MD, MPH  [facility]   Office: [phone]    H&P Update    I have reviewed the history and physical and examined the patient and updated with relevant changes. Consent: I have discussed with the patient and/or the patient representative the indication, alternatives, and the possible risks and/or complications of the planned procedure and the anesthesia methods. The patient and/or patient representative appear to understand and agree to proceed. Vitals:    01/25/21 0713   BP: (!) 152/67   Pulse: 78   Temp: 98 °F (36.7 °C)   SpO2: 98%     Prior to Admission medications    Medication Sig Start Date End Date Taking? Authorizing Provider   HYDROcodone-acetaminophen (NORCO) 5-325 MG per tablet Take 1 tablet by mouth 4 times daily as needed for Pain for up to 30 days.  1/22/21 2/21/21 Yes Dejon Zamora MD   metoprolol succinate (TOPROL XL) 25 MG extended release tablet Take 1 tablet by mouth 2 times daily 1/11/21  Yes Dejon Zamora MD   topiramate (TOPAMAX) 50 MG tablet Take 2 tablets by mouth 2 times daily 1/11/21  Yes Dejon Zamora MD   aspirin EC 81 MG EC tablet Take 1 tablet by mouth daily 1/4/21  Yes Dejon Zamora MD   lidocaine (LIDODERM) 5 % Place 1 patch onto the skin every 24 hours 12/18/20  Yes Historical Provider, MD   DULoxetine (CYMBALTA) 60 MG extended release capsule Take 1 capsule by mouth daily 12/8/20  Yes Dejon Zamora MD   allopurinol (ZYLOPRIM) 100 MG tablet Take 1 tablet by mouth daily 12/4/20  Yes Dejon Zamora MD   spironolactone (ALDACTONE) 25 MG tablet Take 0.5 tablets by mouth three times a week 11/13/20  Yes TIFFANIE Almanzar   vitamin D (ERGOCALCIFEROL) 1.25 MG (51222 UT) CAPS capsule Take 1 capsule by mouth once a week 11/9/20  Yes Sheron Shaw MD   torsemide (DEMADEX) 10 MG tablet TAKE 1 TABLET EVERY OTHER DAY ALTERNATING WITH  2  TABLETS EVERY OTHER DAY 10/14/20  Yes TIFFANIE Almanzar   levothyroxine (SYNTHROID) 112 MCG tablet TAKE 1 TABLET EVERY DAY 9/8/20  Yes Sheron Shaw MD   rivaroxaban (Lisa Wil) 10 MG TABS tablet Take 13 mg by mouth    Historical Provider, MD   senna-docusate (Loise Menezes) 8.6-50 MG per tablet Take 1 tablet by mouth daily as needed 12/17/20   Historical Provider, MD   tolterodine (DETROL LA) 2 MG extended release capsule Take 1 capsule by mouth daily 12/21/20   Sheron Shaw MD     Past Medical History:   Diagnosis Date    Allergic rhinitis, cause unspecified     Arthritis     [facility] fibrillation ([facility] [address]     Bronchopneumonia     CAD (coronary artery disease)     stent:  post cataract surgery (CABG)    Cerebral artery occlusion with cerebral infarction ([facility] [address]     TIA    Chronic gouty arthropathy     Chronic kidney disease     Congestive heart failure, unspecified     Degeneration of cervical intervertebral disc     Essential and other specified forms of tremor     Gout     HIGH CHOLESTEROL     History of CVA (cerebrovascular accident)     Hx of blood clots     Hypertension     Influenza 12/23/2017    Mitral valve stenosis and aortic valve insufficiency     Movement disorder     back problems    Pacemaker     Peptic ulcer, unspecified site, unspecified as acute or chronic, without mention of hemorrhage, perforation, or obstruction     Thyroid disease     Unspecified disorder of kidney and ureter     Unspecified hypothyroidism      Past Surgical History:   Procedure Laterality Date    BACK SURGERY      CARDIAC CATHETERIZATION  7/2012    CARDIAC CATHETERIZATION  08/07/2018    Unsuccesful  of RCA    CARDIAC SURGERY      CABG & Cardiac ablation    CHOLECYSTECTOMY      CORONARY ARTERY BYPASS GRAFT  1987    LIMA- Diag/LAD, SVG- RCA    FEMORAL BYPASS Left 8/22/2019    LEFT FEMORAL TO POPLITEAL BYPASS GRAFT performed by [facility] Baxter MD at [facility] FEMORAL-FEMORAL BYPASS GRAFT N/A 8/22/2019    FEMORAL TO FEMORAL BYPASS performed by Mary Baxter MD at [address] [address] Left 03/16/2017    Left hip pinning    JOINT REPLACEMENT      [place] NJX AA&/STRD TFRML EPI LUMBAR/SACRAL 1 LEVEL Right 12/3/2018    RIGHT L3 AND L4 LUMBAR TRANSFORAMINAL EPIRUAL STEROID INJECTION WITH FLUOROSCOPY performed by Danita Guerrero MD at [facility] PTCA  11/04/2014    MARLENY - 3.0 x 28 to the Ist Diag    SHOULDER SURGERY      left     Allergies   Allergen Reactions    Aspirin Nausea Only    Ativan [Lorazepam] Other (See Comments)     hallucinations    Diltiazem Anaphylaxis    Sulfa Antibiotics Rash and Hives    Dabigatran Nausea Only     And indigestion  And indigestion    Aka Pradaxa    Lipitor [Atorvastatin] Other (See Comments)     Muscle pains    Mysoline [Primidone]     Nsaids     Other Other (See Comments)     Nitroglycerin patches causes severe headaches       Pre-Sedation Documentation and Exam:   I have personally completed a history, physical exam & review of systems for this patient (see notes).     Mallampati Airway Assessment:  Class II     Prior History of Anesthesia Complications:   None    ASA Classification:  Class 3 - A patient with severe systemic disease that limits activity but is not incapacitating    Sedation/ Anesthesia Plan:   Intravenous sedation    Medications Planned:   Midazolam (Versed) and Fentanyl intravenously    Patient is an appropriate candidate for plan of sedation:   Yes    Electronically signed by Raimundo Victoria MD on 1/25/2021 at 7:47 AM

## 2021-01-25 NOTE — PROCEDURES
[facility]     Electrophysiology Procedure Note       Date of Procedure: 1/25/2021  Patient's Name: Adebayo Radford  YOB: 1940   Medical Record Number: [id number]  Procedure Performed by: Everardo England MD    Procedures performed:    · Insertion of a new right atrial lead under fluoroscopy   · Generator change out of dual chamber pacemaker  · IV sedation. · Sedation start time: 8 AM  · Sedation stop time: 10:05 AM     Indication of the procedure: Pacemaker battery depletion  Adebayo Radford is a [de-identified] y.o. female with battery at the end of service requiring generator change. She has history of Atrial fib and underwent cardioversion in 2008.  She had recurrent Atrial fib and underwent RFCA on 4/24/09 by Dr. Maite Gonzales. Azalee Old therapy was discussed with patient in 2014. Linzie Oar dual chamber pacemaker ([address]) on 11/8/13 for sick sinus syndrome , and AV block reported at [facility] 1490. Joe Jumper of the device noted to be depleted and she is here for generator change out. Details of procedure: The risks, benefits and alternatives of the procedure were discussed with the patient. The risks including, but not limited to, the risks of bleeding, infection, pain, device malfunction, lead dislodgement, injury to cardiac and surrounding structures, stroke, and death were discussed in detail. The patient opted to proceed with the device implantation. Written informed consent was signed and placed in the chart. Patient was brought to the electrophysiology lab in a fasting nonsedated state. Prophylactic antibiotic was given. Chest was prepped and draped in the usual sterile fashion. After injection of 2% lidocaine in the left pectoral area, an incision was made over the previous scar and extended to the pocket using electrocautery and blunt dissection. The old device was removed.      Before closing the pocket it was noted that the atrial lead has an acute bend and also an old suture around it. The lead was [employer] lead which are thin and prone to fracture. We carefully  the lead from scar tissue and also remove the suture around it. It was noted that the insulation has been broken off and the lead has noise on it with intermittent high impedance and lack of capture suggesting of impending lead fracture. Initially we try to repair the lead with silicon gel and plastic wrap around the lead but it did not resolve the issue. Therefore decision was made to replace the lead. Selective venography of the left upper extremity was done to rule out compression of the vein, and because of difficult access, which showed patent vein. Central venous access into the left axillary vein was obtained using the modified Seldinger technique. After central venous access was obtained, a sheath was placed in the axillary vein. The atrial lead was advanced to the right atrial appendage under fluoroscopic guidance. The lead was actively fixated. After confirming appropriate function, the sheath was split and removed. The lead was secured to the underlying tissue using suture. The pocket was irrigated with an antibiotic solution. The old fractured atrial lead was capped and sutured to the pocket. The pocket was vigorously irrigated with antibiotic solution and the leads were connected to the new pulse generator device which was implanted into the clean pocket. The pulse generator was sutured inside the pocket. TYRX was used to reduce risk of infection. The pocket was then closed in three separate subcutaneous layers using 2-0 & 3-0 Vicryl and subcuticular layer using 4-0 Vicryl. The skin was covered with Steri- strips and pressure dressing. All sponge and needle counts were reported as correct at the end of the procedure. The patient tolerated the procedure well and there were no complications. EBL less than 20 ml.       DEVICE and LEAD information:    The device is [facility] [employer]  ACCOLADE implant day 1/25/2021   Atrial lead: INGEVITY implant day 1/25/2021   RV lead: [employer]ant FINELEAD implant day 11/8/2013     Atrial lead: Impedance: Pacing threshold: 0.8 @ 0.4 ms sensing 2.8 mV  RV lead: Impedance: 435 Pacing threshold: 0.5 @ 0.4 ms sensing 21.7 mV    Device was programmed to DDDR with lower rate of 50 and upper tracking rate of 130. Plan:   The patient will have usual post-implant care. Patient can be discharged home if remains stable with follow up in arrhythmia clinic.

## 2021-01-27 LAB
EKG ATRIAL RATE: 78 BPM
EKG DIAGNOSIS: NORMAL
EKG P AXIS: 61 DEGREES
EKG P-R INTERVAL: 216 MS
EKG Q-T INTERVAL: 444 MS
EKG QRS DURATION: 144 MS
EKG QTC CALCULATION (BAZETT): 506 MS
EKG R AXIS: 86 DEGREES
EKG T AXIS: 269 DEGREES
EKG VENTRICULAR RATE: 78 BPM

## 2021-02-01 ENCOUNTER — TELEPHONE (OUTPATIENT)
Dept: CARDIOLOGY CLINIC | Age: 81
End: 2021-02-01

## 2021-02-01 ENCOUNTER — NURSE ONLY (OUTPATIENT)
Dept: CARDIOLOGY CLINIC | Age: 81
End: 2021-02-01
Payer: MEDICARE

## 2021-02-01 DIAGNOSIS — I48.20 CHRONIC ATRIAL FIBRILLATION (HCC): ICD-10-CM

## 2021-02-01 DIAGNOSIS — Z95.0 PACEMAKER: ICD-10-CM

## 2021-02-01 DIAGNOSIS — R00.1 SYMPTOMATIC BRADYCARDIA: ICD-10-CM

## 2021-02-01 DIAGNOSIS — I49.5 SICK SINUS SYNDROME (HCC): ICD-10-CM

## 2021-02-01 PROCEDURE — 93280 PM DEVICE PROGR EVAL DUAL: CPT | Performed by: INTERNAL MEDICINE

## 2021-02-01 RX ORDER — GENTAMICIN SULFATE 1 MG/G
OINTMENT TOPICAL
Qty: 1 TUBE | Refills: 1 | Status: SHIPPED | OUTPATIENT
Start: 2021-02-01 | End: 2021-02-15

## 2021-02-01 NOTE — TELEPHONE ENCOUNTER
Patient was seen today in the device clinic for 1 week follow/wound check. She has recurrent falls and is on Xarelto for AF. She would like an appointment to talk to Dr. Leon Tsai about having the Watchman procedure. Please call and schedule her an appointment.  Thanks

## 2021-02-01 NOTE — PROGRESS NOTES
Patient comes in for programming evaluation for her pacemaker. All sensing and pacing parameters are within normal range. Battery life 15 years  AP 97%.  3%. Sinus Kayley Fuse today. AF with a < 1% burden. Episode noted on 1/31/2021 lasting 10 seconds. Patient remains on Xarelto and metoprolol. Patient had Generator Change on 1/25/2021. Patients daughter removed steri-strips yesterday. NPSR reviewed site and prescribed gentamicin to apply to the site 3 times daily for two weeks. Patient to call the office immediately with any signs on infection. No changes need to be made at this time. Please see interrogation for more detail. Patient will be called to schedule an appointment with Dr Laurent Murillo. Patient would like to discuss Watchman procedure. [place] Monitor received and connected.

## 2021-02-01 NOTE — TELEPHONE ENCOUNTER
Please call to schedule herShe can be seen in one of the open spots tomorrow or on 02/16 in one of the watchman spots

## 2021-02-02 NOTE — TELEPHONE ENCOUNTER
Called [facility] pt for 02/16 1:15pm with RMM to discuss Watchman

## 2021-02-03 ENCOUNTER — OFFICE VISIT (OUTPATIENT)
Dept: FAMILY MEDICINE CLINIC | Age: 81
End: 2021-02-03
Payer: MEDICARE

## 2021-02-03 VITALS
SYSTOLIC BLOOD PRESSURE: 100 MMHG | OXYGEN SATURATION: 95 % | DIASTOLIC BLOOD PRESSURE: 60 MMHG | TEMPERATURE: 95.1 F | HEART RATE: 69 BPM

## 2021-02-03 DIAGNOSIS — R35.0 URINARY FREQUENCY: ICD-10-CM

## 2021-02-03 DIAGNOSIS — I48.0 PAF (PAROXYSMAL ATRIAL FIBRILLATION) (HCC): ICD-10-CM

## 2021-02-03 DIAGNOSIS — M60.9 MYOSITIS, UNSPECIFIED MYOSITIS TYPE, UNSPECIFIED SITE: ICD-10-CM

## 2021-02-03 DIAGNOSIS — R29.6 RECURRENT FALLS WHILE WALKING: Primary | ICD-10-CM

## 2021-02-03 LAB
BACTERIA URINE, POC: ABNORMAL
BILIRUBIN URINE: ABNORMAL
BLOOD, URINE: POSITIVE
CASTS URINE, POC: ABNORMAL
CLARITY: ABNORMAL
COLOR: YELLOW
CRYSTALS URINE, POC: ABNORMAL
EPI CELLS URINE, POC: ABNORMAL
GLUCOSE URINE: ABNORMAL
KETONES, URINE: NEGATIVE
LEUKOCYTE EST, POC: ABNORMAL
NITRITE, URINE: NEGATIVE
PH UA: 6 (ref 4.5–8)
PROTEIN UA: POSITIVE
RBC URINE, POC: ABNORMAL
SPECIFIC GRAVITY UA: 1.02 (ref 1–1.03)
UROBILINOGEN, URINE: NORMAL
WBC URINE, POC: ABNORMAL
YEAST URINE, POC: ABNORMAL

## 2021-02-03 PROCEDURE — 99214 OFFICE O/P EST MOD 30 MIN: CPT | Performed by: FAMILY MEDICINE

## 2021-02-03 PROCEDURE — G8399 PT W/DXA RESULTS DOCUMENT: HCPCS | Performed by: FAMILY MEDICINE

## 2021-02-03 PROCEDURE — 1036F TOBACCO NON-USER: CPT | Performed by: FAMILY MEDICINE

## 2021-02-03 PROCEDURE — G8420 CALC BMI NORM PARAMETERS: HCPCS | Performed by: FAMILY MEDICINE

## 2021-02-03 PROCEDURE — 4040F PNEUMOC VAC/ADMIN/RCVD: CPT | Performed by: FAMILY MEDICINE

## 2021-02-03 PROCEDURE — 81000 URINALYSIS NONAUTO W/SCOPE: CPT | Performed by: FAMILY MEDICINE

## 2021-02-03 PROCEDURE — 1123F ACP DISCUSS/DSCN MKR DOCD: CPT | Performed by: FAMILY MEDICINE

## 2021-02-03 PROCEDURE — 1090F PRES/ABSN URINE INCON ASSESS: CPT | Performed by: FAMILY MEDICINE

## 2021-02-03 PROCEDURE — G8428 CUR MEDS NOT DOCUMENT: HCPCS | Performed by: FAMILY MEDICINE

## 2021-02-03 PROCEDURE — G8482 FLU IMMUNIZE ORDER/ADMIN: HCPCS | Performed by: FAMILY MEDICINE

## 2021-02-03 NOTE — PROGRESS NOTES
Subjective:      Patient ID: Greg Dowling is a [de-identified] y.o. female. CC: Patient presents for acute medical problem-recurrent falls, urinary tract infection and atrial fibrillation. Medical assistant notes reviewed. HPI patient resents in accompaniment of daughter. Patient presents with urinary frequency, and urgency. She went to Urgent care last week and was diagnosed with a urinary tract infection. She was treated with Macrobid to take for 5 days. Patient states aloso her legs are giving out on her easily and has pain in her legs. She has been falling because of this. Her falling does not seem to occur when she first just stands up as she has been careful to use her walker. She seems to have more issues as she is walking that her leg suddenly just give out. She states her legs hurt in general and she feels she having increasing weakness because of this. She also wants to discuss doing a watchman procedure in regards to her atrial fibrillation so she could come off the blood thinner medication. She has stopped the aspirin therapy but does maintain anticoagulation with her atrial fibrillation problems. She understands the risk of being on anticoagulants with recurrent falling. Review of Systems     Allergies   Allergen Reactions    Aspirin Nausea Only    Ativan [Lorazepam] Other (See Comments)     hallucinations    Diltiazem Anaphylaxis    Sulfa Antibiotics Rash and Hives    Dabigatran Nausea Only     And indigestion  And indigestion    Aka Pradaxa    Lipitor [Atorvastatin] Other (See Comments)     Muscle pains    Mysoline [Primidone]     Nsaids     Other Other (See Comments)     Nitroglycerin patches causes severe headaches       Objective:   Physical Exam  Constitutional:       General: She is not in acute distress. Appearance: Normal appearance. She is well-developed. Abdominal:      General: Bowel sounds are normal. There is no distension. Palpations: Abdomen is soft.  Abdomen is not rigid. There is no hepatomegaly, splenomegaly or mass. Tenderness: There is no abdominal tenderness. There is no right CVA tenderness, left CVA tenderness, guarding or rebound. Hernia: No hernia is present. Musculoskeletal:      Right hip: Normal.      Left hip: Normal.      Right knee: Normal.      Left knee: Normal.      Right upper arm: Normal.      Left upper arm: Normal.      Right upper leg: She exhibits tenderness. She exhibits no bony tenderness and no deformity. Left upper leg: She exhibits tenderness. She exhibits no bony tenderness, no swelling and no deformity. Right lower leg: No edema. Left lower leg: No edema. Skin:     General: Skin is warm. Findings: No rash. Neurological:      Mental Status: She is alert and oriented to person, place, and time. Mental status is at baseline. Cranial Nerves: Cranial nerves are intact. Motor: Tremor present. Gait: Gait abnormal.      Deep Tendon Reflexes: Reflexes are normal and symmetric. Comments: Patient using a walker for ambulation. Leg strength is 4 out of 5 in both upper and lower extremities. Psychiatric:         Behavior: Behavior is cooperative. Assessment:      Roc Reddy was seen today for urinary frequency. Diagnoses and all orders for this visit:    Recurrent falls while walking    Urinary frequency  -     POC URINE with Microscopic  -     Culture, Urine    Myositis, unspecified myositis type, unspecified site  -     CK; Future  -     SEDIMENTATION RATE; Future    PAF (paroxysmal atrial fibrillation) ([facility] [address]            Plan:      Discussed with patient and daughter obviously the concern of her being on anticoagulants and recurrent fall and is concerning.   Certainly a watchman procedure may take her off anticoagulants which would be beneficial for her and she is can discuss this with cardiology    Medications were reviewed and there is no clear etiology of what is causing her leg weakness and muscle pain. We will proceed with laboratory testing as she may indeed have polymyalgia rheumatica. We discussed doing a trial of steroids to see if this will make improvement after laboratory testing. We will send urine off for culture as she still continues to have some bacteria    Medical decision making of moderate complexity. Please note that this chart was generated using Dragon dictation software. Although every effort was made to ensure the accuracy of this automated transcription, some errors in transcription may have occurred.

## 2021-02-04 PROBLEM — R29.6 RECURRENT FALLS WHILE WALKING: Status: ACTIVE | Noted: 2021-02-04

## 2021-02-04 LAB — URINE CULTURE, ROUTINE: NORMAL

## 2021-02-04 RX ORDER — NITROGLYCERIN 400 UG/1
1 SPRAY ORAL EVERY 5 MIN PRN
Qty: 1 BOTTLE | Refills: 3 | Status: SHIPPED | OUTPATIENT
Start: 2021-02-04 | End: 2022-02-08 | Stop reason: SDUPTHER

## 2021-02-04 NOTE — TELEPHONE ENCOUNTER
Patients daughter called to cancel med request. She will call her other provider to see if he can fill the prescription today

## 2021-02-04 NOTE — TELEPHONE ENCOUNTER
Pt daughter calling pt needs a refill on her Nitro Spray to go to [facility] [address], [place] [address] [phone] Kayode Felix [phone]   [address] [place] [address], [address] [place] [address]   Phone:  [phone]  Fax:  [phone]. Pt is out of this.  Pls call to advise thank you

## 2021-02-04 NOTE — TELEPHONE ENCOUNTER
nitroGLYCERIN (NITROLINGUAL) 0.4 MG/SPRAY 0.4 mg spray (Discontinued) 1 Bottle 3 9/27/2019 12/17/2019    Sig - Route: Place 1 spray under the tongue every 5 minutes as needed for Chest pain - Subling            [employer] in chart     Provider out of the office

## 2021-02-08 LAB — ERYTHROCYTE SEDIMENTATION RATE: 4 MM/HR (ref 0–30)

## 2021-02-12 ENCOUNTER — TELEPHONE (OUTPATIENT)
Dept: FAMILY MEDICINE CLINIC | Age: 81
End: 2021-02-12

## 2021-02-12 RX ORDER — PREDNISONE 20 MG/1
20 TABLET ORAL DAILY
Qty: 30 TABLET | Refills: 0 | Status: ON HOLD | OUTPATIENT
Start: 2021-02-12 | End: 2021-03-08 | Stop reason: ALTCHOICE

## 2021-02-12 NOTE — TELEPHONE ENCOUNTER
Daughter informed. Said that patient fell again last night and is wanting to know if she should maybe be doing Home PT/OT for her weakness.

## 2021-02-12 NOTE — TELEPHONE ENCOUNTER
Laboratory tests are normal.  I sent a prescription for prednisone once a day to the pharmacy for a month and we will see how her symptoms change.

## 2021-02-12 NOTE — TELEPHONE ENCOUNTER
Daughter called to see if the patient's results are back from [facility].       Please advise

## 2021-02-15 ENCOUNTER — TELEPHONE (OUTPATIENT)
Dept: CARDIOLOGY CLINIC | Age: 81
End: 2021-02-15

## 2021-02-15 NOTE — TELEPHONE ENCOUNTER
Pt canceled 2/16/21 appt due to weather. Pt needs to see RMM discuss watchman. Where can pt be added.  Please advise and call to schedule

## 2021-02-16 ENCOUNTER — TELEPHONE (OUTPATIENT)
Dept: CARDIOLOGY CLINIC | Age: 81
End: 2021-02-16

## 2021-02-16 NOTE — TELEPHONE ENCOUNTER
There is a message from yesterday about rs this patient to see [facility]. She wants an appt as soon as possible .  She would like to speak to [facility] nurse

## 2021-02-17 ENCOUNTER — TELEPHONE (OUTPATIENT)
Dept: FAMILY MEDICINE CLINIC | Age: 81
End: 2021-02-17

## 2021-02-17 NOTE — TELEPHONE ENCOUNTER
Shayna Goyal from [facility] ph# [phone] is calling to say PT 2x a week for three weeks and 1x a week one week.       Please advise

## 2021-02-23 ENCOUNTER — OFFICE VISIT (OUTPATIENT)
Dept: CARDIOLOGY CLINIC | Age: 81
End: 2021-02-23
Payer: MEDICARE

## 2021-02-23 ENCOUNTER — TELEPHONE (OUTPATIENT)
Dept: CARDIOLOGY CLINIC | Age: 81
End: 2021-02-23

## 2021-02-23 ENCOUNTER — NURSE ONLY (OUTPATIENT)
Dept: CARDIOLOGY CLINIC | Age: 81
End: 2021-02-23
Payer: MEDICARE

## 2021-02-23 VITALS
HEART RATE: 71 BPM | HEIGHT: 63 IN | BODY MASS INDEX: 22.64 KG/M2 | WEIGHT: 127.8 LBS | SYSTOLIC BLOOD PRESSURE: 127 MMHG | DIASTOLIC BLOOD PRESSURE: 58 MMHG | OXYGEN SATURATION: 98 %

## 2021-02-23 DIAGNOSIS — Z95.0 PACEMAKER: ICD-10-CM

## 2021-02-23 DIAGNOSIS — I25.82 CHRONIC TOTAL OCCLUSION OF NATIVE CORONARY ARTERY: ICD-10-CM

## 2021-02-23 DIAGNOSIS — Z95.0 PACEMAKER: Primary | ICD-10-CM

## 2021-02-23 DIAGNOSIS — Z01.818 PRE-OP TESTING: ICD-10-CM

## 2021-02-23 DIAGNOSIS — I48.20 CHRONIC ATRIAL FIBRILLATION (HCC): ICD-10-CM

## 2021-02-23 DIAGNOSIS — R00.1 SYMPTOMATIC BRADYCARDIA: ICD-10-CM

## 2021-02-23 DIAGNOSIS — I50.23 ACUTE ON CHRONIC SYSTOLIC (CONGESTIVE) HEART FAILURE (HCC): ICD-10-CM

## 2021-02-23 DIAGNOSIS — I10 HTN (HYPERTENSION), BENIGN: ICD-10-CM

## 2021-02-23 DIAGNOSIS — I25.10 CHRONIC TOTAL OCCLUSION OF NATIVE CORONARY ARTERY: ICD-10-CM

## 2021-02-23 DIAGNOSIS — I48.0 PAROXYSMAL A-FIB (HCC): Primary | ICD-10-CM

## 2021-02-23 DIAGNOSIS — I49.5 SICK SINUS SYNDROME (HCC): ICD-10-CM

## 2021-02-23 PROCEDURE — G8399 PT W/DXA RESULTS DOCUMENT: HCPCS | Performed by: INTERNAL MEDICINE

## 2021-02-23 PROCEDURE — 99214 OFFICE O/P EST MOD 30 MIN: CPT | Performed by: INTERNAL MEDICINE

## 2021-02-23 PROCEDURE — 93288 INTERROG EVL PM/LDLS PM IP: CPT | Performed by: INTERNAL MEDICINE

## 2021-02-23 PROCEDURE — G8427 DOCREV CUR MEDS BY ELIG CLIN: HCPCS | Performed by: INTERNAL MEDICINE

## 2021-02-23 PROCEDURE — G8482 FLU IMMUNIZE ORDER/ADMIN: HCPCS | Performed by: INTERNAL MEDICINE

## 2021-02-23 PROCEDURE — 1123F ACP DISCUSS/DSCN MKR DOCD: CPT | Performed by: INTERNAL MEDICINE

## 2021-02-23 PROCEDURE — 1090F PRES/ABSN URINE INCON ASSESS: CPT | Performed by: INTERNAL MEDICINE

## 2021-02-23 PROCEDURE — 1036F TOBACCO NON-USER: CPT | Performed by: INTERNAL MEDICINE

## 2021-02-23 PROCEDURE — 4040F PNEUMOC VAC/ADMIN/RCVD: CPT | Performed by: INTERNAL MEDICINE

## 2021-02-23 PROCEDURE — G8420 CALC BMI NORM PARAMETERS: HCPCS | Performed by: INTERNAL MEDICINE

## 2021-02-23 RX ORDER — GENTAMICIN SULFATE 1 MG/G
OINTMENT TOPICAL
Qty: 1 TUBE | Refills: 1 | Status: SHIPPED | OUTPATIENT
Start: 2021-02-23 | End: 2021-03-02

## 2021-02-23 NOTE — PROGRESS NOTES
Patient comes in for programming evaluation for her pacemaker. All sensing and pacing parameters are within normal range. Battery life 14 years  AP 82%.  5%. AF with a 5% burden. Last episode noted on 2/23/2021, longest 14 hours. Patient remains on Xarelto and metoprolol. Patient had Generator Change on 1/25/2021. Patients daughter removed steri-strips on 1/31/2021. NPSR reviewed site on 2/1/2021 and prescribed gentamicin to apply to the site 3 times daily for two weeks. Patient stated today that her dog had eaten her tube of ointment so she had not used it. Dr. Eduardo Lewis reviewed site today. \"Device site is well approximated without redness or swelling. Thick scab over incision. \" Gentamycin ointment re-prescribed for Patient today. No changes need to be made at this time. Please see interrogation for more detail. Patient will see Dr. Eduardo Lewis today to discuss watchman and will follow up in 3 months in office or remotely .

## 2021-02-23 NOTE — LETTER
[facility]  [facility] Procedure Sheet    2/23/21  Rojas Ira  1940  EP Procedures     Pacemaker implant (single/dual)  EP Study    ICD implant (single/dual)  Atrial flutter ablation (DONA Y/N)    Biv implant ICD  Tilt Table    Biv implant PPM  Atrial fibrillation ablation (DONA Yes)    Generator Change (PPM/ICD/BiV)  SVT ablation    Lead revision (RV/LA/RA) (<1 month)  VT ablation      Lead extraction +/- upgrade (BiV/PPM/ICD)  VT Ischemic/ non-ischemic    Loop implant/ removal  VT RVOT    Cardioversion  VT Left sided   xxx DONA  AVN ablation     Equipment     [employer]onic   REBECA Mapping System    [employer]  Carto Mapping System    [employer]  CryoAblation    Biotronik  Laser Lead Extraction     EP Procedures Scheduling Request    # hours Requested   Scheduled  Date:   Specific Day  Completed    Anesthesia  F/u Date:   CT surgery backup  COVID     Overnight stay      Location [facility]       Pre-Procedure Labs / Imaging     PT/INR  Type & cross    CBC  Units PRBC    BMP/Mg  Units FFP    Venogram  Cardiac CTA for Pulmonary vein mapping     RN INITIALS: dw    Patient Instructions  Do not eat or drink after midnight the night prior to procedure  Dx: atrial fibrillation. Evaluated VIELKA for watchman

## 2021-02-23 NOTE — PROGRESS NOTES
[facility]   Electrophysiology Follow up   Date: 2/23/2021  I had the privilege of visiting Amparo Almazan in the office. CC: Frequent falls    HPI: Amparo Almazan is a [de-identified] y.o. female with a history of paroxysmal atrial fib and underwent cardioversion in 2008. She had recurrent Atrial fib and underwent RFCA on 4/24/09 by Dr. Sonal Groves. .    Underwent dual chamber pacemaker [address]) on 11/8/13 for sick sinus syndrome , and AV block reported at [facility]90.      She is s/p  generator change out on 01/25/2021. Noted atrial lead issues, tried to repair the lead which was not effective and had a new RA lead inserted. She has significant Coronary disease with history of CABG and multiple interventions. On 02/02/2017 she had a C which showed chronic occlusion of the LAD and has seen interventional cardiology for potential interventions. She was seen on 08/02/2017 and reported having paloitations and chest pain for one months. ECG was done which showed atrial flutter irate in the 140's. She is anticoagulated with Xarelto. She underwent Cardioversion 08/02/2017. She had a PFT that showed severe restrictive disease and she has not followed up with Pulmonology. She was encouraged to see Dr. Keyur Ruelas. She was a previous smoker with suspected COPD and amiodarone is not an option for rate control. She is followed by heart failure. She has had recurrent falls. 12/16/2020  Admission at [facility] for concerns of left kidney contusion s/p fall. S/p generator change 01/25/2021. At wound check 02/01/2021 the steri strips had been  Removed by the patients daughter. Gentamycin ointment prescribed. Roc Reddy presents today in follow up for her device implant and to discuss watchman. Daughter reports she has fallen 8 or 9 times in 2 months. Patient states she is doing \" real good\"    Device site is well approximated without redness or swelling.  Thick scab over incision. Patient did not use gentamycin ointment because the dog ate it. Assessment and plan:      Paroxysmal atrial fibrillation   Patient has high [id number] score 6 and requires anticoagulation to prevent thromboembolic events. [id number] Score: 6    Unfortunately patient has had recurrent falls with injuries. She is looking for alternatives to anticoagulation. I discussed left atrial colsure with watchman device. Alternatives including surgical ligation of VIELKA also discussed. I explained to the patient that most ischemic stroke in patient with atrial fibrillation are due to a thrombus/clot in the left atrial appendage. However some patients do have a stroke with different reasons including hemorrhage. Watchman device only protects against the stroke associated with left atrial appendage related clots/thrombus. The procedure has been discussed in detail with patient and family members along with the risk and benefits. Success rate and complications associated with such a procedure have been discussed. It was also emphasized that the watchman procedure can only be pursued if there is no evidence of thrombus in the left atrial appendage. It was also explained the need for short term coumadin therapy followed by antiplatelet therapy. Patient will need a preoperative DONA. All the questions were answered. Patient wishes to proceed. We will proceed with further workup including if necessary insurance approval.           Recurrent atrial flutter/tachycardia/fibrillation   EKG today Sinus with first degree block   Antiarrythmic therapy is limited since she has extensive structural heart disease. Amiodarone is not indicated because she has severe abnormal PFT.      S/p DCCV 08/02/2017   S/p RFCA for Afib at [facility] by Dr. Everardo Riggins 2009   Toprol XL 25 mg BID   Xarelto 10 mg Creatinine Clearance 27 ml/min   High [id number] score and high risk for stroke and thromboembolism      Atrial fib burden on device today is 5%. Longest 14 hours  . Shortness of Breath/ Severe COPD    Has not seen Dr. Josefa Locke   PFT 2018 shows severe restrictive lung disease   ECHO 04/30/2020 LVEF 45 %      Bradycardia/Sinus node dysfunction   S/p dual chamber pacemaker, generator change with new RA lead 01/25/2021    The CIED was interrogated and programmed and I supervised and reviewed all the data. All findings and changes are in device interrogation sheet and reflect my personal interpretation and changes and is scanned to Epic. AP 82%,  5%, JUSTIN 14  years    CAD   Stable    On BB. Allergy ASA    Plavix DCd 12/21/2020      HTN  -Controlled  -BP goal <130/80  -Home BP monitoring encouraged, printed information provided on how to accurately measure BP at home.  -Counseled to follow a low salt diet to assure blood pressure remains controlled for cardiovascular risk factor modification.   -The patient is counseled to get regular exercise 3-5 times per week and maintain a healthy weight reduce cardiovascular risk factors.         Chronic Systolic Heart failure   Followed by heart failure  Echo 04/30/2020 LVEF 45 %  Aldactone 12.5 mg 3 times weekly  Toprol XL BID      Patient Active Problem List    Diagnosis Date Noted    Pacemaker 04/24/2017     Priority: Low    Cerebrovascular accident (CVA) ([facility] [address]      Priority: Low    HTN (hypertension), benign      Priority: Low    Fibrocystic breast 03/03/2017     Priority: Low    Allergic rhinitis 05/26/2015     Priority: Low    Sick sinus syndrome ([address] 11/19/2013     Priority: Low    COPD (chronic obstructive pulmonary disease) ([facility] [address] 02/14/2013     Priority: Low    Restrictive lung disease 02/14/2013     Priority: Low    Non-rheumatic mitral regurgitation 07/20/2012     Priority: Low    Chronic gouty arthropathy      Priority: Low    Acquired hypothyroidism      Priority: Low    Arthritis      Priority: Low    Chronic atrial fibrillation 05/16/2011     Priority: Low    Mixed hyperlipidemia 05/16/2011     Priority: Low    Recurrent falls while walking 02/04/2021    Symptomatic bradycardia     Pacemaker lead failure     Memory loss due to medical condition 09/09/2020    Bilateral sensorineural hearing loss 07/15/2020    Iron deficiency anemia 04/30/2020    Anemia 04/30/2020    Heart failure ([facility] [address] 03/25/2020    Peripheral vertigo, bilateral     PAF (paroxysmal atrial fibrillation) ([facility] [address]     Dyslipidemia     Dizziness 02/06/2020    Ischemic cardiomyopathy 12/04/2019    Idiopathic peripheral neuropathy 11/22/2019    Atherosclerosis of native arteries of extremities with intermittent claudication, bilateral legs (HCC)     Chronic renal failure, stage 3 (moderate) 07/22/2019    PAD (peripheral artery disease) ([facility] [address] 07/22/2019    Acute on chronic systolic [facility]congest[facility] failure [facility] 12/28/2018    Age related osteoporosis 05/01/2018    Chronic total occlusion of native coronary artery 04/12/2018    Tremor 11/08/2017    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/10/2017    Vitamin D deficiency 08/10/2017     Diagnostic studies:   EKG today sinus with fist degree block    ECHO 04/30/2020   Left ventricular cavity size is normal. Normal left ventricular wall   thickness. Left ventricular function appears to be reduced with an estimated   ejection fraction of 45%. Diffuse hypokinesis. Echo 7/19/19   Summary     Left ventricle - normal size, thickness, reduced function with EF of 45%     LV wall motion - diffuse hypokinesis.     Mitral valve - thickened, annular calcification, moderate regurgitation     Aortic valve - thickened, calcified, mild regurgitation     Tricuspid valve - mild regurgitation with PASP of 25mmHg     Pulmonic valve - trivial regurgitation     Biatrial enlargement     Pacemaker / ICD lead is visualized in the right heart. I independently reviewed the cardiac diagnostic studies, ECG and relevant imaging studies.      [facility] 05/20/2019  Findings:                      LM       Normal              LAD     50% ostial, mid 100%              Cx        40% OM1 at bifurcation              RCA     Mid 100%              LVG     Not performed              EDP     15 mmHg              L-LAD  Patent              S-PDA Known occluded  Severe Ca++:None  Post Cath Dx:Stable CAD, no apparent culprit for NSTEMI  Intervention:  None  Med Rec:        Continue aggressive med tx                          Continue plavix, no asa due     Cath: 2/3/17 showed 100% prox RCA with bridging collateral, 100% SVG to RCA, 100% prox LAD after D1, D1 stent widely patent, Mild Cx disease, Normal LV FXN--EF 60%--EDP 12 post hydration.               Stable anatomy       Nuc stress 6/28/16: ([facility])   Final Conclusions/Summary  Stress ECG Impressions: No significant ST changes during vasodilator infusion  Summary: - Abnormal moderat risk stress test with moderate size and severity  anterolateral wall ischemia - Normal LV size and systolic function    Lab Results   Component Value Date    LVEF 45 07/19/2019    LVEFMODE Echo 07/19/2019     Lab Results   Component Value Date    TSH 0.23 (L) 11/09/2020         Physical Examination:  Vitals:    02/23/21 1305   BP: (!) 127/58   Pulse: 71   SpO2: 98%      Wt Readings from Last 3 Encounters:   02/23/21 127 lb 12.8 oz (58 kg)   01/25/21 125 lb (56.7 kg)   01/12/21 125 lb (56.7 kg)       · Constitutional: Oriented. No distress. · Head: Normocephalic and atraumatic. · Mouth/Throat: Oropharynx is clear and moist.   · Eyes: Conjunctivae normal. EOM are normal.   · Neck: Neck supple. No JVD present. · Cardiovascular: Normal rate, regular rhythm, S1&S2. Incision is healing, scab on the incision. Will use gentamicin   · Pulmonary/Chest: Bilateral respiratory sounds. No rhonchi. · Abdominal: Soft. No tenderness. · Musculoskeletal: No tenderness. No edema    · Lymphadenopathy: Has no cervical adenopathy.    · Neurological: Alert TAKE 1 TABLET EVERY DAY 9/8/20  Yes Magui Albert MD   tolterodine (DETROL LA) 2 MG extended release capsule Take 1 capsule by mouth daily  Patient not taking: Reported on 2/23/2021 12/21/20   Magui Albert MD       Allergies   Allergen Reactions    Aspirin Nausea Only    Ativan [Lorazepam] Other (See Comments)     hallucinations    Diltiazem Anaphylaxis    Sulfa Antibiotics Rash and Hives    Dabigatran Nausea Only     And indigestion  And indigestion    Aka Pradaxa    Lipitor [Atorvastatin] Other (See Comments)     Muscle pains    Mysoline [Primidone]     Nsaids     Other Other (See Comments)     Nitroglycerin patches causes severe headaches       Social History:  Reviewed. reports that she quit smoking about 34 years ago. Her smoking use included cigarettes. She has a 28.00 pack-year smoking history. She has never used smokeless tobacco. She reports previous alcohol use. She reports that she does not use drugs. Family History:  Reviewed. Reviewed. No family history of SCD. Relevant and available labs, and cardiovascular diagnostics reviewed. Reviewed. I independently reviewed relevant and available cardiac diagnostic tests ECG, CXR, Echo, Stress test, Device interrogation, Holter, CT scan. - The patient is counseled to follow a low salt diet to assure blood pressure remains controlled for cardiovascular risk factor modification.   - The patient is counseled to avoid excess caffeine, and energy drinks as this may exacerbated ectopy and arrhythmia. - The patient is counseled to get regular exercise 3-5 times per week to control cardiovascular risk factors. - The patient is counseled to lose weigt to control cardiovascular risk factors. - The patient is counseled to avoid tobacco use. All questions and concerns were addressed to the patient/family. Alternatives to my treatment were discussed.  I have discussed the above stated plan and the patient verbalized understanding and agreed with the plan. Scribe attestation: This note was scribed in the presence of Mary King MD by Lizbeth Kinsey RN    Physician Attestation: I, Dr. Mary King, confirm that the scribe's documentation has been prepared under my direction and personally reviewed by me in its entirety. I also confirm that the note above accurately reflects all work, treatment, procedures, and medical decision making performed by me. NOTE: This report was transcribed using voice recognition software. Every effort was made to ensure accuracy, however, inadvertent computerized transcription errors may be present.      Mary King MD, MPH  [place] [address]   Office: [phone]  Fax: [phone]

## 2021-02-23 NOTE — PATIENT INSTRUCTIONS
Geri Hanna will call you to schedule you DONA  Continue to apply gentamicin ointment three times a day for two weeks .

## 2021-02-23 NOTE — LETTER
[address] [facility], [facility] ([facility])        Dear, Mariella Meredith        Your Watchman procedure has been scheduled for Monday 5/17/2021 at [facility] [place] with Dr. Rachel Hair MD.  Please arrive directly to the Cath Lab at 10 am.     You will need to make arrangements to have a responsible adult drive you home after your procedure and someone needs to stay with you for 24 hours. Procedure labs Procedure labs will need to be completed at [facility] [place] Wednesday 5/12/2021 the week before your procedure. Please check in with Registration in the main lobby of the hospital. The test to perform [address] LAB NOT OUTPATIENT LAB. are as follows BMP, CBC, Urine test and Type and Screen. Per our conversation you need to get a Covid-19 test done day of the procedure. Pre-Procedure Instructions:  · You will need to FAST for at least 8 hours prior to your procedure, nothing to eat or drink after midnight. · NO caffeine the morning of your procedure. · You need to wash your body with a soap called HIBICLENS the evening before or the morning of your scheduled procedure from the neck down. A prescription has been sent to your pharmacy. If your insurance does not cover the soap, you can pick it up over the counter, ask your pharmacy for help. · Take an Aspirin 325 mg the morning of the procedure. · Do not take Xarelto the day of the procedure. · Hold multi. Vitamins the day of procedure. · Hold Demadex the day of procedure. · Hold Pain medications. ·  ALL other medications can be taken in the morning with sips of water  · Do not use any lotions, creams or perfume the morning of procedure. · You will be going home the day of the procedure. Pre-certification  Our office will be in contact with your insurance company regarding pre-certification.  If for some reason, your procedure is still pending the day before your scheduled procedure, it must be moved or delayed until we have authorization to proceed. If you have Medicare, no pre-cert is required. Co-Payment  If you have a copay or a deposit due that is your responsibility to pay at the time of service, the Cardiac Cath lab will accept your payment on the day of your procedure. Please bring with you a form of payment. Any questions regarding your copay, please contact your insurance company. Please bring your photo ID, insurance cards and copayment if you have one. (Cash, checks & credit cards are accepted)        On the day of your procedure      Please report directly to  The [address] [facility] [address]. You should park in the lot directly in front of the hospital, you are able to [address] for free from (6AM-4:30PM). As you approach the hospital you will notice that there are two entrances: The first entrance is the MAIN entrance that will be on your LEFT that has the Revolving Door, the second entrance is the [facility] entrance that will be on your RIGHT. We prefer that you take the Bridge as it's closer to your destination. Once you enter the Hospital, turn to your RIGHT and walk directly to the Cath lab which will be at the very end. I will be in contact with you the week before your procedure. You may reach me at [phone] in the meantime.      Sincerely,     Harish Aguirre RN,  Watchman Coordinator

## 2021-02-25 ENCOUNTER — TELEPHONE (OUTPATIENT)
Dept: CARDIAC CATH/INVASIVE PROCEDURES | Age: 81
End: 2021-02-25

## 2021-02-25 NOTE — TELEPHONE ENCOUNTER
----- Message from Bryce Angel sent at 2/25/2021  2:22 PM EST -----  Regarding: scheduling  Her DONA is scheduled 3/8/21.

## 2021-03-02 ENCOUNTER — OFFICE VISIT (OUTPATIENT)
Dept: PRIMARY CARE CLINIC | Age: 81
End: 2021-03-02
Payer: MEDICARE

## 2021-03-02 DIAGNOSIS — Z20.828 EXPOSURE TO SARS-ASSOCIATED CORONAVIRUS: Primary | ICD-10-CM

## 2021-03-02 LAB — SARS-COV-2: NOT DETECTED

## 2021-03-02 PROCEDURE — 99211 OFF/OP EST MAY X REQ PHY/QHP: CPT | Performed by: NURSE PRACTITIONER

## 2021-03-02 PROCEDURE — G8428 CUR MEDS NOT DOCUMENT: HCPCS | Performed by: NURSE PRACTITIONER

## 2021-03-02 PROCEDURE — G8420 CALC BMI NORM PARAMETERS: HCPCS | Performed by: NURSE PRACTITIONER

## 2021-03-02 NOTE — PROGRESS NOTES
Rehana Campbell received a viral test for COVID-19. They were educated on isolation and quarantine as appropriate. For any symptoms, they were directed to seek care from their PCP, given contact information to establish with a doctor, directed to an urgent care or the emergency room.

## 2021-03-02 NOTE — PATIENT INSTRUCTIONS

## 2021-03-04 ENCOUNTER — OFFICE VISIT (OUTPATIENT)
Dept: CARDIOLOGY CLINIC | Age: 81
End: 2021-03-04
Payer: MEDICARE

## 2021-03-04 VITALS
DIASTOLIC BLOOD PRESSURE: 60 MMHG | HEIGHT: 63 IN | WEIGHT: 126 LBS | BODY MASS INDEX: 22.32 KG/M2 | SYSTOLIC BLOOD PRESSURE: 112 MMHG | HEART RATE: 88 BPM | OXYGEN SATURATION: 100 %

## 2021-03-04 DIAGNOSIS — I25.810 CORONARY ARTERY DISEASE INVOLVING AUTOLOGOUS ARTERY CORONARY BYPASS GRAFT WITHOUT ANGINA PECTORIS: ICD-10-CM

## 2021-03-04 DIAGNOSIS — I20.0 UNSTABLE ANGINA (HCC): Primary | ICD-10-CM

## 2021-03-04 DIAGNOSIS — I50.22 CHRONIC SYSTOLIC CONGESTIVE HEART FAILURE (HCC): Primary | ICD-10-CM

## 2021-03-04 DIAGNOSIS — I20.8 STABLE ANGINA PECTORIS (HCC): ICD-10-CM

## 2021-03-04 DIAGNOSIS — N28.9 RENAL INSUFFICIENCY: ICD-10-CM

## 2021-03-04 DIAGNOSIS — I48.20 CHRONIC ATRIAL FIBRILLATION (HCC): ICD-10-CM

## 2021-03-04 PROCEDURE — G8420 CALC BMI NORM PARAMETERS: HCPCS | Performed by: CLINICAL NURSE SPECIALIST

## 2021-03-04 PROCEDURE — 1123F ACP DISCUSS/DSCN MKR DOCD: CPT | Performed by: CLINICAL NURSE SPECIALIST

## 2021-03-04 PROCEDURE — 1090F PRES/ABSN URINE INCON ASSESS: CPT | Performed by: CLINICAL NURSE SPECIALIST

## 2021-03-04 PROCEDURE — 93000 ELECTROCARDIOGRAM COMPLETE: CPT | Performed by: CLINICAL NURSE SPECIALIST

## 2021-03-04 PROCEDURE — 99214 OFFICE O/P EST MOD 30 MIN: CPT | Performed by: CLINICAL NURSE SPECIALIST

## 2021-03-04 PROCEDURE — G8427 DOCREV CUR MEDS BY ELIG CLIN: HCPCS | Performed by: CLINICAL NURSE SPECIALIST

## 2021-03-04 PROCEDURE — 1036F TOBACCO NON-USER: CPT | Performed by: CLINICAL NURSE SPECIALIST

## 2021-03-04 PROCEDURE — G8482 FLU IMMUNIZE ORDER/ADMIN: HCPCS | Performed by: CLINICAL NURSE SPECIALIST

## 2021-03-04 PROCEDURE — G8399 PT W/DXA RESULTS DOCUMENT: HCPCS | Performed by: CLINICAL NURSE SPECIALIST

## 2021-03-04 PROCEDURE — 4040F PNEUMOC VAC/ADMIN/RCVD: CPT | Performed by: CLINICAL NURSE SPECIALIST

## 2021-03-04 NOTE — PROGRESS NOTES
[facility] [place]  Progress Note    Primary Care Doctor:  Leopold Hope, MD    Chief Complaint   Patient presents with    Follow-up     2 month f/u    Congestive Heart Failure        History of Present Illness:  [de-identified] y.o. female with history of Ms. Ronal Mcnally She has a history of atrial fibrillation (on xarelto), TIA, chronic kidney disease, CHF, hypertension, hyperlipidemia, mitral valve disease, hypothyroidism. Som Windy last LVEF was 45% on 7/19/19. CABG, 7/08 cath- patent LIMA to LAD, SVG occluded to RCA; 8/2019 left to right fem-fem bypass and left fem to above knee popliteal bypass  Pacemaker generator change 1/25/21    I had the pleasure of seeing Adebayo Radford in follow up for sHF. She is ambulatory and with her daughter today. She is planning watchman with Dr Vinnie Bobby. She tells me that she has had chest pain x3 over the past 2 weeks which was relieved by nitro spray. The pain resolved with the nitro. She had her pacemaker generator changed on 1/25/21 and thought it was to do with that. EKG today shows AF. She denies any edema, lightheadedness or increased sob. Past Medical History:   has a past medical history of Allergic rhinitis, cause unspecified, Arthritis, Atrial fibrillation ([address] Bronchopneumonia, CAD (coronary artery disease), Cerebral artery occlusion with cerebral infarction ([address] Chronic gouty arthropathy, Chronic kidney disease, Congestive heart failure, unspecified, Degeneration of cervical intervertebral disc, Essential and other specified forms of tremor, Gout, HIGH CHOLESTEROL, History of CVA (cerebrovascular accident), Hx of blood clots, Hypertension, Influenza, Mitral valve stenosis and aortic valve insufficiency, Movement disorder, Pacemaker, Peptic ulcer, unspecified site, unspecified as acute or chronic, without mention of hemorrhage, perforation, or obstruction, Thyroid disease, Unspecified disorder of kidney and ureter, and Unspecified hypothyroidism.   Surgical History: has a past surgical history that includes back surgery; Cholecystectomy; Cardiac surgery; Coronary artery bypass graft (1987); shoulder surgery; Cardiac catheterization (7/2012); hip surgery (Left, 03/16/2017); joint replacement; Cardiac catheterization (08/07/2018); Percutaneous Transluminal Coronary Angio (11/04/2014); pr njx aa&/strd tfrml epi lumbar/sacral 1 level (Right, 12/3/2018); Femoral-femoral Bypass Graft (N/A, 8/22/2019); and femoral bypass (Left, 8/22/2019). Social History:   reports that she quit smoking about 34 years ago. Her smoking use included cigarettes. She has a 28.00 pack-year smoking history. She has never used smokeless tobacco. She reports previous alcohol use. She reports that she does not use drugs. Family History:   Family History   Problem Relation Age of Onset    Cancer Sister     Heart Disease Sister     Diabetes Brother     Hypertension Brother     Heart Disease Brother     Stroke Maternal Aunt     Heart Disease Maternal Aunt     Diabetes Maternal Uncle     Hypertension Paternal Aunt     Cancer Maternal Grandmother     Cancer Paternal Grandmother     Rheum Arthritis Neg Hx     Lupus Neg Hx        Home Medications:  Prior to Admission medications    Medication Sig Start Date End Date Taking?  Authorizing Provider   predniSONE (DELTASONE) 20 MG tablet Take 1 tablet by mouth daily 2/12/21 3/14/21 Yes Oneal Lopez MD   nitroGLYCERIN (NITROLINGUAL) 0.4 MG/SPRAY 0.4 mg spray Place 1 spray under the tongue every 5 minutes as needed for Chest pain 2/4/21  Yes Kaila Obando MD   rivaroxaban (XARELTO) 10 MG TABS tablet Take 13 mg by mouth   Yes Historical Provider, MD   metoprolol succinate (TOPROL XL) 25 MG extended release tablet Take 1 tablet by mouth 2 times daily 1/11/21  Yes Oneal Lopez MD   topiramate (TOPAMAX) 50 MG tablet Take 2 tablets by mouth 2 times daily 1/11/21  Yes Oneal Lopez MD   lidocaine (LIDODERM) 5 % Place 1 patch onto the skin every 24 hours 12/18/20  Yes Abby Newton MD   DULoxetine (CYMBALTA) 60 MG extended release capsule Take 1 capsule by mouth daily 12/8/20  Yes Jj Peraza MD   allopurinol (ZYLOPRIM) 100 MG tablet Take 1 tablet by mouth daily 12/4/20  Yes Jj Peraza MD   spironolactone (ALDACTONE) 25 MG tablet Take 0.5 tablets by mouth three times a week 11/13/20  Yes TIFFANIE Gunn - CNS   vitamin D (ERGOCALCIFEROL) 1.25 MG (26011 UT) CAPS capsule Take 1 capsule by mouth once a week 11/9/20  Yes Jj Peraza MD   torsemide (DEMADEX) 10 MG tablet TAKE 1 TABLET EVERY OTHER DAY ALTERNATING WITH  2  TABLETS EVERY OTHER DAY 10/14/20  Yes TIFFANIE Khan - CNS   levothyroxine (SYNTHROID) 112 MCG tablet TAKE 1 TABLET EVERY DAY 9/8/20  Yes Jj Peraza MD        Allergies:  Aspirin, Ativan [lorazepam], Diltiazem, Sulfa antibiotics, Dabigatran, Lipitor [atorvastatin], Mysoline [primidone], Nsaids, and Other     Review of Systems:   · Constitutional: there has been no unanticipated weight loss. There's been no change in energy level, sleep pattern, or activity level. · Eyes: No visual changes or diplopia. No scleral icterus. · ENT: No Headaches, hearing loss or vertigo. No mouth sores or sore throat. · Cardiovascular: Reviewed in HPI  · Respiratory: No cough or wheezing, no sputum production. No hematemesis. · Gastrointestinal: No abdominal pain, appetite loss, blood in stools. No change in bowel or bladder habits. States she feels bloated   · Genitourinary: No dysuria, trouble voiding, or hematuria. · Musculoskeletal:  No gait disturbance, weakness or joint complaints. · Integumentary: No rash or pruritis. · Neurological: No headache, diplopia, change in muscle strength, numbness or tingling. No change in gait, balance, coordination, mood, affect, memory, mentation, behavior. · Psychiatric: No anxiety, no depression. · Endocrine: No malaise, fatigue or temperature intolerance.  No excessive thirst, fluid intake, or urination. No tremor. · Hematologic/Lymphatic: No abnormal bruising or bleeding, blood clots or swollen lymph nodes. · Allergic/Immunologic: No nasal congestion or hives. Physical Examination:    Vitals:    03/04/21 1255   BP: 112/60   Site: Right Upper Arm   Position: Sitting   Cuff Size: Medium Adult   Pulse: 97   SpO2: 91%   Weight: 126 lb (57.2 kg)   Height: 5' 3\" (1.6 m)        Constitutional and General Appearance: Warm and dry, no apparent distress, normal coloration  HEENT:  Normocephalic, atraumatic  Respiratory:  · Normal excursion and expansion without use of accessory muscles  · Resp Auscultation: Normal breath sounds without dullness  Cardiovascular:  · The apical impulses not displaced  · Heart tones are crisp and normal  · JVP normal H2O  · irregular rate and irregular rhythm  · Peripheral pulses are symmetrical and full  · There is no clubbing, cyanosis of the extremities.   · No ankle edema  · Pedal Pulses: 2+ and equal   Abdomen:  · No masses or tenderness  · Liver/Spleen: No Abnormalities Noted  Neurological/Psychiatric:  · Alert and oriented in all spheres  · Moves all extremities well  · Exhibits normal gait balance and coordination  · No abnormalities of mood, affect, memory, mentation, or behavior are noted    Lab Data:    CBC:   Lab Results   Component Value Date    WBC 13.0 01/25/2021    WBC 9.0 12/23/2020    WBC 12.4 12/17/2020    RBC 4.71 01/25/2021    RBC 4.13 12/23/2020    RBC 4.50 12/17/2020    HGB 13.0 01/25/2021    HGB 11.5 12/23/2020    HGB 12.3 12/17/2020    HCT 42.8 01/25/2021    HCT 37.1 12/23/2020    HCT 40.1 12/17/2020    MCV 90.7 01/25/2021    MCV 89.9 12/23/2020    MCV 89.2 12/17/2020    RDW 19.3 01/25/2021    RDW 19.3 12/23/2020    RDW 19.4 12/17/2020     01/25/2021     12/23/2020     12/17/2020     BMP:  Lab Results   Component Value Date     01/25/2021     12/23/2020     12/17/2020    K 3.8 01/25/2021    K 4.5 12/23/2020    K 4.3 12/17/2020    K 4.9 12/16/2020    K 4.2 11/07/2020    K 4.1 03/25/2020     01/25/2021     12/23/2020     12/17/2020    CO2 23 01/25/2021    CO2 24 12/23/2020    CO2 18 12/17/2020    PHOS 3.3 12/23/2020    PHOS 3.1 12/17/2020    PHOS 3.1 11/09/2020    BUN 39 01/25/2021    BUN 35 12/23/2020    BUN 34 12/17/2020    CREATININE 1.5 01/25/2021    CREATININE 1.3 12/23/2020    CREATININE 1.19 12/17/2020     BNP:   Lab Results   Component Value Date    PROBNP 2,170 11/07/2020    PROBNP 2,375 08/04/2020    PROBNP 2,255 04/21/2020     Cardiac Imaging:  Echo 4/30/2020   Left ventricular cavity size is normal. Normal left ventricular wall   thickness. Left ventricular function appears to be reduced with an estimated   ejection fraction of 45%. Diffuse hypokinesis. Echo 7/19/2019   Summary    Left ventricle - normal size, thickness, reduced function with EF of 45%  LV wall motion - diffuse hypokinesis. Mitral valve - thickened, annular calcification, moderate regurgitation  Aortic valve - thickened, calcified, mild regurgitation  Tricuspid valve - mild regurgitation with PASP of 25mmHg  Pulmonic valve - trivial regurgitation   Biatrial enlargement  Pacemaker / ICD lead is visualized in the right heart. 6/2019 Dr Mya Guzman  NSTEMI: . Angiogram findings were as below:      Findings:                      LM       Normal              LAD     50% ostial, mid 100%              Cx        40% OM1 at bifurcation              RCA     Mid 100%              LVG     Not performed              EDP     15 mmHg              L-LAD  Patent              S-PDA Known occluded  Severe Ca++:None  Post Cath Dx:Stable CAD, no apparent culprit for NSTEMI  Intervention:  None  Med Rec:        Continue aggressive med tx                          Continue plavix, no asa due to allergy. statin added.  LDL 75   8/7/18  The PCI of complex proximal RCA chronic total occlusion was unsuccessful (J- score 3). Underwent successful Angioplasty of high-grade proximal RCA lesion proximal to the . RECOMMENDATIONS:  Attempt on this complex long  was unsuccessful. Lack   Of retrograde collaterals along with a very ambiguous cap as well as a large RV marginal branch and bridging collateral coming off at the cap makes it a  challenging repeat attempt. If she continues to have angina, it is recommended to proceed with the single-vessel SVG to the RCA. Echo: 2/17/16 ([facility])  Conclusions: Normal LV size and systolic function Mild to moderate mitral  regurgitation Mild tricuspid regurgitation  Findings:   Left Atrium: Mild to moderate enlargement of the left atrium. Left Ventricle: Upper limits of normal left ventricle size. No left ventricular  hypertrophy. Normal left ventricular systolic function. The ejection fraction   Is visually estimated to be 55 %. Right At[facility]: Normal right atrial size. RightVentricle: Normal right ventricle size. Normal right ventricular function. Aortic Valve: Tri-leaflet aortic valve. Mild aortic cusp calcification. No  aortic valve stenosis. Mild (1+) aortic valve regurgitation. Aorta: No dilatation of the aortic root. IVC/PA: Normal IVC size and normal respiratory  collapse consistent with normal right atrial pressure. Mitral Valve: Mild mitral valve leaflet calcification. Mild to moderate (2+) mitral valve  regurgitation. No mitral valve stenosis. Tricuspid Valve: Mild (1+) tricuspid  regurgitation. The pulmonary artery pressure is normal.   Pulmonic Valve: Mild  pulmonic regurgitation. Pericardium: Normal pericardium with no significant  pericardial effusion. Assessment:    1. Chronic systolic heart failure (HCC) on bb, no ace/arb due to dizziness; aldosterone antagonist    2. Chronic atrial fibrillation on [employer]   3. Coronary artery disease involving autologous artery coronary bypass graft without angina pectoris    4.       Renal insufficiency follows with Dr Ramona Schneider  5. Chest pain    Plan:   1. Check blood work  2. I will speak to Dr Tracey Negro regarding chest pain  3. Continue all current medications  4. RTI in 4 months    Discussed with patient on the phone. Dr Tracey Negro agrees for need of LHC. Rover[place]ia and scheduling will call patient to schedule LHC    Dr Mariaelena Meadows and Ramona Schneider notified of [address]    I appreciate the opportunity of cooperating in the care of this individual.    WINSTON Eden, 3/4/2021, 1:08 PM    QUALITY MEASURES  1. Tobacco Cessation Counseling: NA  2. Retake of BP if >140/90:   NA  3. Documentation to PCP/referring for new patient:  Sent to PCP at close of office visit  4. CAD patient on anti-platelet: Yes  5. CAD patient on STATIN therapy:  No did not tolerate  6.  Patient with CHF and aFib on anticoagulation:  Yes

## 2021-03-04 NOTE — PATIENT INSTRUCTIONS
1. Check blood work  2. I will speak to Dr Shwtea Roach regarding chest pain  3. Continue all current medications  4.   RTI in 4 months

## 2021-03-04 NOTE — Clinical Note
JEREMY   She was complaining of 3 different episodes of chest pain over the past 2 weeks  Dr Antonio Perez is planning on a C  Thanks  Tory

## 2021-03-05 ENCOUNTER — TELEPHONE (OUTPATIENT)
Dept: CARDIOLOGY CLINIC | Age: 81
End: 2021-03-05

## 2021-03-05 NOTE — TELEPHONE ENCOUNTER
Cath Scheduled:    3-12-21 / 9-10:30 - [facility] w/GONZALO @ [facility]  PREPS:  * Bring list of your medications. * Please notify us before the procedure if you are allergic to anything; especially x-ray contrast dye, iodine, nickel, or any type of jewlry. This is very important! * Do no eat or drink anthing at all after midnight (or 8 hours) prior to the procedure. * Take all morning medications EXCEPT any diuretics (water pills) the day of the procedure with a small amount of water. * If you are on Coumadin, Warfarin, or Fabian Folds, please notify us so that we can make adjustments to the medications. * If you are on Xarelto, Eliquis, or Pradaxa please stop taking these medications 3 days prior to the procedure(including the day of the procedure). * If you ely insulin in the morning, check your blood sugar the morning of your procedure. If your blood sugar is 120 or less, do not take insulin. If your blood suger is more than 120, take half the dose of your normal insulin. No matter what your blood sugar level is, take all oral diabetic medications. * You MUST have someone to drive you home - no driving for 24 hours after your procedure. If an intervention is performed, you might stay overnight in the hospital.   * Discharge instructions will be given to you at the time of your procedure.

## 2021-03-08 ENCOUNTER — HOSPITAL ENCOUNTER (OUTPATIENT)
Dept: CARDIAC CATH/INVASIVE PROCEDURES | Age: 81
Discharge: HOME | End: 2021-03-08
Attending: INTERNAL MEDICINE | Admitting: INTERNAL MEDICINE
Payer: MEDICARE

## 2021-03-08 VITALS — HEIGHT: 63 IN | BODY MASS INDEX: 22.32 KG/M2 | WEIGHT: 126 LBS

## 2021-03-08 LAB
ANION GAP SERPL CALCULATED.3IONS-SCNC: 12 MMOL/L (ref 3–16)
BUN BLDV-MCNC: 49 MG/DL (ref 7–20)
CALCIUM SERPL-MCNC: 9.3 MG/DL (ref 8.3–10.6)
CHLORIDE BLD-SCNC: 106 MMOL/L (ref 99–110)
CO2: 25 MMOL/L (ref 21–32)
CREAT SERPL-MCNC: 1.8 MG/DL (ref 0.6–1.2)
GFR AFRICAN AMERICAN: 33
GFR NON-AFRICAN AMERICAN: 27
GLUCOSE BLD-MCNC: 94 MG/DL (ref 70–99)
HCT VFR BLD CALC: 45.2 % (ref 36–48)
HEMOGLOBIN: 13.8 G/DL (ref 12–16)
MCH RBC QN AUTO: 26.3 PG (ref 26–34)
MCHC RBC AUTO-ENTMCNC: 30.5 G/DL (ref 31–36)
MCV RBC AUTO: 86.3 FL (ref 80–100)
PDW BLD-RTO: 19.7 % (ref 12.4–15.4)
PLATELET # BLD: 308 K/UL (ref 135–450)
PMV BLD AUTO: 10.1 FL (ref 5–10.5)
POTASSIUM SERPL-SCNC: 4.1 MMOL/L (ref 3.5–5.1)
RBC # BLD: 5.24 M/UL (ref 4–5.2)
SODIUM BLD-SCNC: 143 MMOL/L (ref 136–145)
WBC # BLD: 14 K/UL (ref 4–11)

## 2021-03-08 PROCEDURE — 93005 ELECTROCARDIOGRAM TRACING: CPT | Performed by: INTERNAL MEDICINE

## 2021-03-08 PROCEDURE — 80048 BASIC METABOLIC PNL TOTAL CA: CPT

## 2021-03-08 PROCEDURE — 93010 ELECTROCARDIOGRAM REPORT: CPT | Performed by: INTERNAL MEDICINE

## 2021-03-08 PROCEDURE — 99212 OFFICE O/P EST SF 10 MIN: CPT

## 2021-03-08 PROCEDURE — 85027 COMPLETE CBC AUTOMATED: CPT

## 2021-03-08 PROCEDURE — 36415 COLL VENOUS BLD VENIPUNCTURE: CPT

## 2021-03-09 ENCOUNTER — OFFICE VISIT (OUTPATIENT)
Dept: FAMILY MEDICINE CLINIC | Age: 81
End: 2021-03-09
Payer: MEDICARE

## 2021-03-09 VITALS
WEIGHT: 124.2 LBS | HEART RATE: 71 BPM | SYSTOLIC BLOOD PRESSURE: 110 MMHG | DIASTOLIC BLOOD PRESSURE: 74 MMHG | OXYGEN SATURATION: 97 % | BODY MASS INDEX: 22 KG/M2 | TEMPERATURE: 96.4 F

## 2021-03-09 DIAGNOSIS — E03.9 ACQUIRED HYPOTHYROIDISM: ICD-10-CM

## 2021-03-09 DIAGNOSIS — R41.3 MEMORY LOSS DUE TO MEDICAL CONDITION: ICD-10-CM

## 2021-03-09 DIAGNOSIS — R35.0 URINARY FREQUENCY: Primary | ICD-10-CM

## 2021-03-09 DIAGNOSIS — N18.30 CHRONIC RENAL FAILURE, STAGE 3 (MODERATE), UNSPECIFIED WHETHER STAGE 3A OR 3B CKD (HCC): ICD-10-CM

## 2021-03-09 DIAGNOSIS — M15.9 PRIMARY OSTEOARTHRITIS INVOLVING MULTIPLE JOINTS: ICD-10-CM

## 2021-03-09 DIAGNOSIS — R25.1 TREMOR: ICD-10-CM

## 2021-03-09 LAB
BACTERIA URINE, POC: ABNORMAL
BILIRUBIN URINE: 0 MG/DL
BLOOD, URINE: NEGATIVE
CASTS URINE, POC: ABNORMAL
CLARITY: ABNORMAL
COLOR: YELLOW
CRYSTALS URINE, POC: ABNORMAL
EKG ATRIAL RATE: 340 BPM
EKG DIAGNOSIS: NORMAL
EKG Q-T INTERVAL: 354 MS
EKG QRS DURATION: 98 MS
EKG QTC CALCULATION (BAZETT): 435 MS
EKG R AXIS: -13 DEGREES
EKG T AXIS: -55 DEGREES
EKG VENTRICULAR RATE: 91 BPM
EPI CELLS URINE, POC: ABNORMAL
GLUCOSE URINE: ABNORMAL
KETONES, URINE: NEGATIVE
LEUKOCYTE EST, POC: ABNORMAL
NITRITE, URINE: NEGATIVE
PH UA: 5.5 (ref 4.5–8)
PROTEIN UA: NEGATIVE
RBC URINE, POC: ABNORMAL
SPECIFIC GRAVITY UA: 1.02 (ref 1–1.03)
UROBILINOGEN, URINE: NORMAL
WBC URINE, POC: ABNORMAL
YEAST URINE, POC: ABNORMAL

## 2021-03-09 PROCEDURE — 1123F ACP DISCUSS/DSCN MKR DOCD: CPT | Performed by: FAMILY MEDICINE

## 2021-03-09 PROCEDURE — 1090F PRES/ABSN URINE INCON ASSESS: CPT | Performed by: FAMILY MEDICINE

## 2021-03-09 PROCEDURE — 4040F PNEUMOC VAC/ADMIN/RCVD: CPT | Performed by: FAMILY MEDICINE

## 2021-03-09 PROCEDURE — 81000 URINALYSIS NONAUTO W/SCOPE: CPT | Performed by: FAMILY MEDICINE

## 2021-03-09 PROCEDURE — 99214 OFFICE O/P EST MOD 30 MIN: CPT | Performed by: FAMILY MEDICINE

## 2021-03-09 PROCEDURE — G8420 CALC BMI NORM PARAMETERS: HCPCS | Performed by: FAMILY MEDICINE

## 2021-03-09 PROCEDURE — G8399 PT W/DXA RESULTS DOCUMENT: HCPCS | Performed by: FAMILY MEDICINE

## 2021-03-09 PROCEDURE — G8482 FLU IMMUNIZE ORDER/ADMIN: HCPCS | Performed by: FAMILY MEDICINE

## 2021-03-09 PROCEDURE — G8427 DOCREV CUR MEDS BY ELIG CLIN: HCPCS | Performed by: FAMILY MEDICINE

## 2021-03-09 PROCEDURE — 1036F TOBACCO NON-USER: CPT | Performed by: FAMILY MEDICINE

## 2021-03-09 RX ORDER — TOPIRAMATE 50 MG/1
50 TABLET, FILM COATED ORAL 2 TIMES DAILY
Qty: 120 TABLET | Refills: 2 | Status: SHIPPED | OUTPATIENT
Start: 2021-03-09 | End: 2021-04-07

## 2021-03-09 RX ORDER — PREDNISONE 10 MG/1
10 TABLET ORAL DAILY
Qty: 90 TABLET | Refills: 0 | Status: SHIPPED | OUTPATIENT
Start: 2021-03-09 | End: 2021-05-26

## 2021-03-09 NOTE — PROGRESS NOTES
Subjective:      Patient ID: Stephanie Singh is a [de-identified] y.o. female. CC: Patient presents for re-evaluation of chronic health problems including possible urinary tract infection, acute on chronic renal failure, tremor, osteoarthritis and memory loss. [facility] [place] Patient presents today for a follow-up on chronic medications and medical conditions in accompaniment of daughter secondary to her memory issues. . Patient was supposed to get an angiogram yesterday and they were ready to go and her labs showed her kidney function was bad so she couldn't get this done. Patient has been getting dizzy again. She almost fell at home today. Prior to the day though with the institution of physical therapy and prednisone medication her weakness and dizzy episodes and improved significantly. She is currently taking prednisone at 20 mg daily. CPK and sed rate were both normal.  Daughter also does not feel the increase of Topamax medication helped her out in regards to the tremor problems. Memory issues are about same. Daughter is concerned that she possibly has a urinary tract infection. Last urine culture did not demonstrate any infection.     Review of Systems     Patient Active Problem List   Diagnosis    Chronic atrial fibrillation    Mixed hyperlipidemia    Chronic gouty arthropathy    Acquired hypothyroidism    Arthritis    Non-rheumatic mitral regurgitation    COPD (chronic obstructive pulmonary disease) (HCC)    Restrictive lung disease    Sick sinus syndrome (HCC)    Allergic rhinitis    Fibrocystic breast    Cerebrovascular accident (CVA) ([address]    HTN (hypertension), benign    Pacemaker    Primary osteoarthritis involving multiple joints    Vitamin D deficiency    Tremor    Chronic total occlusion of native coronary artery    Age related osteoporosis    Acute on chronic systolic (congestive) heart failure (HCC)    Chronic renal failure, stage 3 (moderate)    PAD (peripheral artery disease) ([address]    Atherosclerosis of native arteries of extremities with intermittent claudication, bilateral legs (HCC)    Idiopathic peripheral neuropathy    Ischemic cardiomyopathy    Dizziness    Peripheral vertigo, bilateral    PAF (paroxysmal atrial fibrillation) (HCC)    Dyslipidemia    Heart failure (HCC)    Iron deficiency anemia    Anemia    Bilateral sensorineural hearing loss    Memory loss due to medical condition    Symptomatic bradycardia    Pacemaker lead failure    Recurrent falls while walking       Outpatient Medications Marked as Taking for the 3/9/21 encounter (Office Visit) with Dejon Zamora MD   Medication Sig Dispense Refill    nitroGLYCERIN (NITROLINGUAL) 0.4 MG/SPRAY 0.4 mg spray Place 1 spray under the tongue every 5 minutes as needed for Chest pain 1 Bottle 3    rivaroxaban (XARELTO) 10 MG TABS tablet Take 13 mg by mouth      metoprolol succinate (TOPROL XL) 25 MG extended release tablet Take 1 tablet by mouth 2 times daily 60 tablet 2    topiramate (TOPAMAX) 50 MG tablet Take 2 tablets by mouth 2 times daily 120 tablet 2    DULoxetine (CYMBALTA) 60 MG extended release capsule Take 1 capsule by mouth daily 90 capsule 1    allopurinol (ZYLOPRIM) 100 MG tablet Take 1 tablet by mouth daily 90 tablet 1    spironolactone (ALDACTONE) 25 MG tablet Take 0.5 tablets by mouth three times a week 30 tablet 0    vitamin D (ERGOCALCIFEROL) 1.25 MG (54555 UT) CAPS capsule Take 1 capsule by mouth once a week 12 capsule 3    torsemide (DEMADEX) 10 MG tablet TAKE 1 TABLET EVERY OTHER DAY ALTERNATING WITH  2  TABLETS EVERY OTHER  tablet 0    levothyroxine (SYNTHROID) 112 MCG tablet TAKE 1 TABLET EVERY DAY 90 tablet 1       Allergies   Allergen Reactions    Aspirin Nausea Only    Ativan [Lorazepam] Other (See Comments)     hallucinations    Diltiazem Anaphylaxis    Sulfa Antibiotics Rash and Hives    Dabigatran Nausea Only     And indigestion  And indigestion    Aka Pradaxa    Lipitor [Atorvastatin] Other (See Comments)     Muscle pains    Mysoline [Primidone]     Nsaids     Other Other (See Comments)     Nitroglycerin patches causes severe headaches       Social History     Tobacco Use    Smoking status: Former Smoker     Packs/day: 1.00     Years: 28.00     Pack years: 28.00     Types: Cigarettes     Quit date: 1987     Years since quittin.2    Smokeless tobacco: Never Used    Tobacco comment: H.O.smoking at age 15 / smoked up to 1 p.p.d / quit    Substance Use Topics    Alcohol use: Not Currently     Alcohol/week: 0.0 standard drinks       /74 (Site: Left Upper Arm, Position: Sitting, Cuff Size: Medium Adult)   Pulse 71   Temp 96.4 °F (35.8 °C) (Infrared)   Wt 124 lb 3.2 oz (56.3 kg)   SpO2 97%   BMI 22.00 kg/m²       Objective:   Physical Exam  Constitutional:       General: She is not in acute distress. Appearance: She is well-developed. Neck:      Vascular: No carotid bruit. Cardiovascular:      Rate and Rhythm: Normal rate. Rhythm irregularly irregular. Pulses:           Dorsalis pedis pulses are 2+ on the right side and 2+ on the left side. Posterior tibial pulses are 2+ on the right side and 2+ on the left side. Heart sounds: Murmur present. Systolic (Right sternal border) murmur present with a grade of 2/6. No friction rub. No gallop. Pulmonary:      Effort: Pulmonary effort is normal.      Breath sounds: Normal breath sounds. Musculoskeletal: Normal range of motion. General: No tenderness. Right lower leg: No edema. Left lower leg: No edema. Neurological:      Mental Status: She is alert and oriented to person, place, and time. Cranial Nerves: Cranial nerves are intact. Sensory: Sensation is intact. Motor: Tremor present. No weakness or atrophy. Gait: Gait is intact. Psychiatric:         Behavior: Behavior is cooperative.          Assessment:      José Miguel Branham was seen today for 3

## 2021-03-10 LAB — URINE CULTURE, ROUTINE: NORMAL

## 2021-03-15 ENCOUNTER — TELEPHONE (OUTPATIENT)
Dept: CARDIOLOGY CLINIC | Age: 81
End: 2021-03-15

## 2021-03-15 RX ORDER — ALLOPURINOL 100 MG/1
100 TABLET ORAL DAILY
Qty: 90 TABLET | Refills: 0 | Status: SHIPPED | OUTPATIENT
Start: 2021-03-15 | End: 2021-05-25

## 2021-03-15 RX ORDER — TORSEMIDE 10 MG/1
TABLET ORAL
Qty: 135 TABLET | Refills: 0 | Status: SHIPPED | OUTPATIENT
Start: 2021-03-15 | End: 2021-04-05 | Stop reason: SDUPTHER

## 2021-03-15 NOTE — PROGRESS NOTES
Thanks for your note  I spoke to Dr Shweta Roach and she is now scheduled for 3/19  They saw all your recommendations

## 2021-03-15 NOTE — TELEPHONE ENCOUNTER
torsemide (DEMADEX) 10 MG tablet [[id number]      allopurinol (ZYLOPRIM) 100 MG tablet [[id number]]              [facility] - [place] [phone] - F [phone]   [address] [place], [place] [address]   Phone:  [phone]  Fax:  [phone]

## 2021-03-15 NOTE — TELEPHONE ENCOUNTER
Per DCE, he would like patient to be rescheduled for her LHC, but she needs to come in for fluids 6 hours prior to the cath. I relayed this to the patient and she expressed understanding. Spoke with Delmis and she will reschedule lhc.

## 2021-03-19 ENCOUNTER — HOSPITAL ENCOUNTER (OUTPATIENT)
Dept: CARDIAC CATH/INVASIVE PROCEDURES | Age: 81
Setting detail: OUTPATIENT SURGERY
Discharge: HOME | End: 2021-03-19
Attending: INTERNAL MEDICINE | Admitting: INTERNAL MEDICINE
Payer: MEDICARE

## 2021-03-19 VITALS
WEIGHT: 124 LBS | HEIGHT: 63 IN | TEMPERATURE: 97.8 F | HEART RATE: 90 BPM | RESPIRATION RATE: 16 BRPM | SYSTOLIC BLOOD PRESSURE: 139 MMHG | DIASTOLIC BLOOD PRESSURE: 66 MMHG | BODY MASS INDEX: 21.97 KG/M2

## 2021-03-19 LAB
ANION GAP SERPL CALCULATED.3IONS-SCNC: 11 MMOL/L (ref 3–16)
BUN BLDV-MCNC: 50 MG/DL (ref 7–20)
CALCIUM SERPL-MCNC: 9.3 MG/DL (ref 8.3–10.6)
CHLORIDE BLD-SCNC: 109 MMOL/L (ref 99–110)
CO2: 23 MMOL/L (ref 21–32)
CREAT SERPL-MCNC: 1.9 MG/DL (ref 0.6–1.2)
GFR AFRICAN AMERICAN: 31
GFR NON-AFRICAN AMERICAN: 25
GLUCOSE BLD-MCNC: 99 MG/DL (ref 70–99)
POTASSIUM SERPL-SCNC: 4.7 MMOL/L (ref 3.5–5.1)
SODIUM BLD-SCNC: 143 MMOL/L (ref 136–145)

## 2021-03-19 PROCEDURE — 6360000004 HC RX CONTRAST MEDICATION: Performed by: INTERNAL MEDICINE

## 2021-03-19 PROCEDURE — 2500000003 HC RX 250 WO HCPCS

## 2021-03-19 PROCEDURE — 93459 L HRT ART/GRFT ANGIO: CPT | Performed by: INTERNAL MEDICINE

## 2021-03-19 PROCEDURE — C1894 INTRO/SHEATH, NON-LASER: HCPCS

## 2021-03-19 PROCEDURE — 6360000002 HC RX W HCPCS

## 2021-03-19 PROCEDURE — 93459 L HRT ART/GRFT ANGIO: CPT

## 2021-03-19 PROCEDURE — 93010 ELECTROCARDIOGRAM REPORT: CPT | Performed by: INTERNAL MEDICINE

## 2021-03-19 PROCEDURE — 99152 MOD SED SAME PHYS/QHP 5/>YRS: CPT

## 2021-03-19 PROCEDURE — 99153 MOD SED SAME PHYS/QHP EA: CPT

## 2021-03-19 PROCEDURE — 2709999900 HC NON-CHARGEABLE SUPPLY

## 2021-03-19 PROCEDURE — 80048 BASIC METABOLIC PNL TOTAL CA: CPT

## 2021-03-19 PROCEDURE — 36415 COLL VENOUS BLD VENIPUNCTURE: CPT

## 2021-03-19 PROCEDURE — 99152 MOD SED SAME PHYS/QHP 5/>YRS: CPT | Performed by: INTERNAL MEDICINE

## 2021-03-19 PROCEDURE — 93005 ELECTROCARDIOGRAM TRACING: CPT | Performed by: INTERNAL MEDICINE

## 2021-03-19 PROCEDURE — C1769 GUIDE WIRE: HCPCS

## 2021-03-19 RX ADMIN — IOPAMIDOL 25 ML: 755 INJECTION, SOLUTION INTRAVENOUS at 16:31

## 2021-03-19 NOTE — H&P
[address] Consult/H+P  Interventional Cardiology/Structural Heart Disease    REASON FOR CONSULT/CHIEF COMPLAINT/HPI     RFC/CC Sob, cp   HPI Matt Coffman is a [de-identified] y. o.presents for [facility] due to cp and sob felt unstable angina. HISTORY/ALLERGIES/ROS     Med:  has a past medical history of Allergic rhinitis, cause unspecified, Arthritis, Atrial fibrillation ([address] Bronchopneumonia, CAD (coronary artery disease), Cerebral artery occlusion with cerebral infarction ([address] Chronic gouty arthropathy, Chronic kidney disease, Congestive heart failure, unspecified, Degeneration of cervical intervertebral disc, Essential and other specified forms of tremor, Gout, HIGH CHOLESTEROL, History of CVA (cerebrovascular accident), Hx of blood clots, Hypertension, Influenza, Mitral valve stenosis and aortic valve insufficiency, Movement disorder, Pacemaker, Peptic ulcer, unspecified site, unspecified as acute or chronic, without mention of hemorrhage, perforation, or obstruction, Thyroid disease, Unspecified disorder of kidney and ureter, and Unspecified hypothyroidism. Surg:  has a past surgical history that includes back surgery; Cholecystectomy; Cardiac surgery; Coronary artery bypass graft (1987); shoulder surgery; Cardiac catheterization (7/2012); hip surgery (Left, 03/16/2017); joint replacement; Cardiac catheterization (08/07/2018); Percutaneous Transluminal Coronary Angio (11/04/2014); pr njx aa&/strd tfrml epi lumbar/sacral 1 level (Right, 12/3/2018); Femoral-femoral Bypass Graft (N/A, 8/22/2019); and femoral bypass (Left, 8/22/2019). Soc:  reports that she quit smoking about 34 years ago. Her smoking use included cigarettes. She has a 28.00 pack-year smoking history. She has never used smokeless tobacco. She reports previous alcohol use. She reports that she does not use drugs.    Fam: [unfilled]  Allerg: Aspirin, Ativan [lorazepam], Diltiazem, Sulfa antibiotics, Dabigatran, Lipitor [atorvastatin], cervical or ax LA   Ch Wall NT, no deform Neuro Nl gross M/S exam     LABS     Relevant and available CV data reviewed    ASSESSMENT AND PLAN     *UA  C today  Prehydrated and renal preconsulted

## 2021-03-19 NOTE — PRE SEDATION
Mallampati:  I (soft palate, uvula, fauces, tonsillar pillars visible)    ASA Classification:  Class 2 - A normal healthy patient with mild systemic disease      Tess Scale: Activity:  2 - Able to move 4 extremities voluntarily on command  Respiration:  2 - Able to breathe deeply and cough freely  Circulation:  2 - BP+/- 20mmHg of normal  Consciousness:  2 - Fully awake  Oxygen Saturation (color):  2 - Able to maintain oxygen saturation >92% on room air    Sedation/Anesthesia Plan:  Guard the patient's safety and welfare. Minimize physical discomfort and pain. Minimize negative psychological responses to treatment by providing sedation and analgesia and maximize the potential amnesia. Patient to meet pre-procedure discharge plan.     Medication Planned:  midazolam intravenously and fentanyl intravenously    Patient is an appropriate candidate for plan of sedation: yes      Electronically signed by Nat Alex MD on 3/19/2021 at 12:07 PM

## 2021-03-19 NOTE — PROGRESS NOTES
[address]  Procedure Note    Procedure: [facility] with grafts  Indication: Unstable angina    Procedure Details:  Consent Access Bleed R Sedat Start Stop Versed Fentanyl Contrast Fluoro EBL Comp Spec   Yes LCFA low MCS 1544 1607 1 50 25 6.3 <20 None None   *Ultrasound Note: Ultrasound guidance used to determine aforementioned artery patency, size (>2mm), anatomic variations and ideal puncture location. Real-time ultrasound utilized concurrent with vascular needle entry into the artery. Image(s) permanently recorded and reported in the patient chart.   *Sedation Note: MCSFC = minimal conscious sedation for comfort    Findings:  Artery Findings/Result   LM Normal   LAD 50% ostial, 50% mid instent   Cx 50% mid at OM1 bifurcation, 30% OM1 mid at bifurcation   RI NA   % prox with multiple R-R collaterals   LVEDP 8   LVG NA due to renal failure     Intervention:   None    Post Cath Dx:   Stable CAD  Possible angina from  of RCA - poor candidate for  intervention with renal failure

## 2021-03-23 LAB
EKG ATRIAL RATE: 90 BPM
EKG DIAGNOSIS: NORMAL
EKG P-R INTERVAL: 240 MS
EKG Q-T INTERVAL: 390 MS
EKG QRS DURATION: 98 MS
EKG QTC CALCULATION (BAZETT): 477 MS
EKG R AXIS: 2 DEGREES
EKG T AXIS: 63 DEGREES
EKG VENTRICULAR RATE: 90 BPM

## 2021-03-25 ENCOUNTER — HOSPITAL ENCOUNTER (OUTPATIENT)
Age: 81
Discharge: HOME | End: 2021-03-25
Payer: MEDICARE

## 2021-03-25 DIAGNOSIS — N18.32 STAGE 3B CHRONIC KIDNEY DISEASE (HCC): ICD-10-CM

## 2021-03-25 DIAGNOSIS — E03.9 ACQUIRED HYPOTHYROIDISM: ICD-10-CM

## 2021-03-25 DIAGNOSIS — N17.9 ACUTE KIDNEY INJURY SUPERIMPOSED ON CHRONIC KIDNEY DISEASE (HCC): ICD-10-CM

## 2021-03-25 DIAGNOSIS — I12.9 BENIGN HYPERTENSION WITH CHRONIC KIDNEY DISEASE, STAGE III (HCC): ICD-10-CM

## 2021-03-25 DIAGNOSIS — N18.9 ACUTE KIDNEY INJURY SUPERIMPOSED ON CHRONIC KIDNEY DISEASE (HCC): ICD-10-CM

## 2021-03-25 DIAGNOSIS — I50.22 CHRONIC SYSTOLIC CONGESTIVE HEART FAILURE (HCC): ICD-10-CM

## 2021-03-25 DIAGNOSIS — N18.30 CHRONIC RENAL FAILURE, STAGE 3 (MODERATE), UNSPECIFIED WHETHER STAGE 3A OR 3B CKD (HCC): ICD-10-CM

## 2021-03-25 DIAGNOSIS — N18.30 BENIGN HYPERTENSION WITH CHRONIC KIDNEY DISEASE, STAGE III (HCC): ICD-10-CM

## 2021-03-25 LAB
ALBUMIN SERPL-MCNC: 3.5 G/DL (ref 3.4–5)
ANION GAP SERPL CALCULATED.3IONS-SCNC: 12 MMOL/L (ref 3–16)
BUN BLDV-MCNC: 31 MG/DL (ref 7–20)
CALCIUM SERPL-MCNC: 9.2 MG/DL (ref 8.3–10.6)
CHLORIDE BLD-SCNC: 108 MMOL/L (ref 99–110)
CO2: 23 MMOL/L (ref 21–32)
CREAT SERPL-MCNC: 1.5 MG/DL (ref 0.6–1.2)
CREATININE URINE: 35.5 MG/DL (ref 28–259)
GFR AFRICAN AMERICAN: 40
GFR NON-AFRICAN AMERICAN: 33
GLUCOSE BLD-MCNC: 86 MG/DL (ref 70–99)
HCT VFR BLD CALC: 45.7 % (ref 36–48)
HEMOGLOBIN: 14.1 G/DL (ref 12–16)
MCH RBC QN AUTO: 27.1 PG (ref 26–34)
MCHC RBC AUTO-ENTMCNC: 30.8 G/DL (ref 31–36)
MCV RBC AUTO: 87.8 FL (ref 80–100)
PARATHYROID HORMONE INTACT: 70 PG/ML (ref 14–72)
PDW BLD-RTO: 19.2 % (ref 12.4–15.4)
PHOSPHORUS: 3.1 MG/DL (ref 2.5–4.9)
PLATELET # BLD: 239 K/UL (ref 135–450)
PMV BLD AUTO: 9.7 FL (ref 5–10.5)
POTASSIUM SERPL-SCNC: 4.5 MMOL/L (ref 3.5–5.1)
PROTEIN PROTEIN: 10 MG/DL
RBC # BLD: 5.21 M/UL (ref 4–5.2)
SODIUM BLD-SCNC: 143 MMOL/L (ref 136–145)
TSH SERPL DL<=0.05 MIU/L-ACNC: 0.9 UIU/ML (ref 0.27–4.2)
VITAMIN D 25-HYDROXY: 56.9 NG/ML
WBC # BLD: 8.6 K/UL (ref 4–11)

## 2021-03-25 PROCEDURE — 84156 ASSAY OF PROTEIN URINE: CPT

## 2021-03-25 PROCEDURE — 84443 ASSAY THYROID STIM HORMONE: CPT

## 2021-03-25 PROCEDURE — 85027 COMPLETE CBC AUTOMATED: CPT

## 2021-03-25 PROCEDURE — 80069 RENAL FUNCTION PANEL: CPT

## 2021-03-25 PROCEDURE — 82570 ASSAY OF URINE CREATININE: CPT

## 2021-03-25 PROCEDURE — 83970 ASSAY OF PARATHORMONE: CPT

## 2021-03-25 PROCEDURE — 82306 VITAMIN D 25 HYDROXY: CPT

## 2021-03-26 ENCOUNTER — TELEPHONE (OUTPATIENT)
Dept: CARDIAC CATH/INVASIVE PROCEDURES | Age: 81
End: 2021-03-26

## 2021-03-26 NOTE — TELEPHONE ENCOUNTER
----- Message from Kojo Negro sent at 3/26/2021  3:53 PM EDT -----  Regarding: [facility]  She has rescheduled her DONA pre Watchman 4/14/21.   Thank you  Oanh Boone

## 2021-03-30 ENCOUNTER — CARE COORDINATION (OUTPATIENT)
Dept: CARE COORDINATION | Age: 81
End: 2021-03-30

## 2021-03-30 NOTE — CARE COORDINATION
RN YE Note:  The RNYE spoke with the pt's daughter/caregiver, Teresita Olsen. The daughter stated she feels like the pt is doing well at the present. The daughter declined Care Coordination at this time but did take the nurse's name and contact information.

## 2021-04-05 RX ORDER — TORSEMIDE 10 MG/1
TABLET ORAL
Qty: 135 TABLET | Refills: 1 | Status: SHIPPED | OUTPATIENT
Start: 2021-04-05 | End: 2021-05-25

## 2021-04-05 NOTE — TELEPHONE ENCOUNTER
----- Message from Taylor Gtuierrez sent at 4/5/2021  8:53 AM EDT -----  Subject: Refill Request    QUESTIONS  Name of Medication? torsemide (DEMADEX) 10 MG tablet  Patient-reported dosage and instructions? 20 mg every other day   How many days do you have left? 2  [facility]erred [facility]? [facility] phone number (if available)? [phone]  ---------------------------------------------------------------------------  --------------  Gregory MIRAMONTES  What is the best way for the office to contact you? OK to leave message on   voicemail  Preferred Call Back Phone Number?  [phone]

## 2021-04-06 ENCOUNTER — TELEPHONE (OUTPATIENT)
Dept: FAMILY MEDICINE CLINIC | Age: 81
End: 2021-04-06

## 2021-04-06 NOTE — TELEPHONE ENCOUNTER
Patient needs a referral for a neurologist.  She doesn't know which one that is covered under her insurance. If she get a few names so she can call. She wants to go to [address].   She has a Mass behind her left kidney      Also waiting to hear back about Dr. Lizzy Cassidy for a home visit since the patient is in so much pain      Medicare and [employer] works      Please advise

## 2021-04-06 NOTE — TELEPHONE ENCOUNTER
I wouldn't be able to home visit for the next couple weeks.   We could you try to do a virtual visit

## 2021-04-07 ENCOUNTER — CARE COORDINATION (OUTPATIENT)
Dept: CARE COORDINATION | Age: 81
End: 2021-04-07

## 2021-04-07 ENCOUNTER — TELEPHONE (OUTPATIENT)
Dept: CARDIOLOGY CLINIC | Age: 81
End: 2021-04-07

## 2021-04-07 ENCOUNTER — VIRTUAL VISIT (OUTPATIENT)
Dept: FAMILY MEDICINE CLINIC | Age: 81
End: 2021-04-07
Payer: MEDICARE

## 2021-04-07 DIAGNOSIS — S34.109A CLOSED FRACTURE OF LUMBAR VERTEBRA WITH SPINAL CORD INJURY, INITIAL ENCOUNTER (HCC): Primary | ICD-10-CM

## 2021-04-07 DIAGNOSIS — S32.008A CLOSED FRACTURE OF LUMBAR VERTEBRA WITH SPINAL CORD INJURY, INITIAL ENCOUNTER (HCC): Primary | ICD-10-CM

## 2021-04-07 PROCEDURE — G8428 CUR MEDS NOT DOCUMENT: HCPCS | Performed by: FAMILY MEDICINE

## 2021-04-07 PROCEDURE — G8399 PT W/DXA RESULTS DOCUMENT: HCPCS | Performed by: FAMILY MEDICINE

## 2021-04-07 PROCEDURE — 1123F ACP DISCUSS/DSCN MKR DOCD: CPT | Performed by: FAMILY MEDICINE

## 2021-04-07 PROCEDURE — 4040F PNEUMOC VAC/ADMIN/RCVD: CPT | Performed by: FAMILY MEDICINE

## 2021-04-07 PROCEDURE — 1090F PRES/ABSN URINE INCON ASSESS: CPT | Performed by: FAMILY MEDICINE

## 2021-04-07 PROCEDURE — 99214 OFFICE O/P EST MOD 30 MIN: CPT | Performed by: FAMILY MEDICINE

## 2021-04-07 RX ORDER — OXYCODONE HYDROCHLORIDE AND ACETAMINOPHEN 5; 325 MG/1; MG/1
1 TABLET ORAL EVERY 4 HOURS PRN
Qty: 60 TABLET | Refills: 0 | Status: SHIPPED | OUTPATIENT
Start: 2021-04-07 | End: 2021-04-14

## 2021-04-07 RX ORDER — TOPIRAMATE 50 MG/1
TABLET, FILM COATED ORAL
Qty: 360 TABLET | Refills: 0 | Status: SHIPPED | OUTPATIENT
Start: 2021-04-07 | End: 2021-06-04

## 2021-04-07 NOTE — PROGRESS NOTES
2021    TELEHEALTH EVALUATION -- Audio/Visual (During KNQIZ- public health emergency)    HPI: Catrina Kuhn (:  1940) has requested an audio/video evaluation for the following concern(s):    ER follow-up after fall and back pain    Subjective: Patient presents for a doxy visit in accompaniment of daughter. Patient went to the emergency room on  with severe lower back pain. She was told in the emergency room that she had a kidney lesion and she is concerned about that and was told that she should be evaluated by urology and nephrology and neurology. Patient is have a lot of pain in her back and have difficulty ambulating at this point time the severity of the pain. She has been taking hydrocodone up to 3 times a day with some back pain improvement. She not in a local treatment. She denies any issues with bowel or bladder associated with the back pain.     Review of Systems    Patient Active Problem List   Diagnosis    Chronic atrial fibrillation    Mixed hyperlipidemia    Chronic gouty arthropathy    Acquired hypothyroidism    Arthritis    Non-rheumatic mitral regurgitation    COPD (chronic obstructive pulmonary disease) ([facility]    Restrictive lung disease    Sick sinus syndrome ([facility])    Allergic rhinitis    Unstable angina ([facility])    Fibrocystic breast    C[facility]vascular [facility] ([facility]A) [facility]    HTN (hypertension), benign    Pacemaker    Primary osteoarthritis involving multiple joints    Vitamin D deficiency    Tremor    Chronic total occlusion of native coronary artery    Age related osteoporosis    Acute on chronic systolic (congestive) heart failure ([facility])    Chronic renal failure, stage 3 (moderate)    PAD (peripheral artery disease) ([facility])    Atherosclerosis of native arteries of extremities with intermittent claudication, bilateral legs (HCC)    Idiopathic peripheral neuropathy    Ischemic cardiomyopathy    Dizziness    Peripheral vertigo, bilateral    PAF (paroxysmal atrial fibrillation) (HCC)    Dyslipidemia    Heart failure (HCC)    Iron deficiency anemia    Anemia    Bilateral sensorineural hearing loss    Memory loss due to medical condition    Symptomatic bradycardia    Pacemaker lead failure    Recurrent falls while walking       Outpatient Medications Marked as Taking for the 21 encounter (Virtual Visit) with Lolita Naik MD   Medication Sig Dispense Refill    oxyCODONE-acetaminophen (PERCOCET) 5-325 MG per tablet Take 1 tablet by mouth every 4 hours as needed for Pain for up to 7 days. Intended supply: 7 days. Take lowest dose possible to manage pain 60 tablet 0       Allergies   Allergen Reactions    Aspirin Nausea Only    Ativan [Lorazepam] Other (See Comments)     hallucinations    Diltiazem Anaphylaxis    Sulfa Antibiotics Rash and Hives    Dabigatran Nausea Only     And indigestion  And indigestion    Aka Pradaxa    Lipitor [Atorvastatin] Other (See Comments)     Muscle pains    Mysoline [Primidone]     Nsaids     Other Other (See Comments)     Nitroglycerin patches causes severe headaches       Social History     Tobacco Use    Smoking status: Former Smoker     Packs/day: 1.00     Years: 28.00     Pack years: 28.00     Types: Cigarettes     Quit date: 1987     Years since quittin.2    Smokeless tobacco: Never Used    Tobacco comment: H.O.smoking at age 15 / smoked up to 1 p.p.d / quit    Substance Use Topics    Alcohol use: Not Currently     Alcohol/week: 0.0 standard drinks         PHYSICAL EXAMINATION:  [ INSTRUCTIONS:  \"[x]\" Indicates a positive item  \"[]\" Indicates a negative item  -- DELETE ALL ITEMS NOT EXAMINED]  Vital Signs: (As obtained by patient/caregiver or practitioner observation)    There were no vitals taken for this visit.     Blood pressure-  Heart rate-  Weight-    Temperature-  Pulse oximetry-     Constitutional: [] Appears well-developed and well-nourished [] No apparent distress      [] Abnormal-   Mental status  [x] Alert and awake  [x] Oriented to person/place/time [x]Able to follow commands      Eyes:  EOM    [x]  Normal  [] Abnormal-  Sclera  []  Normal  [] Abnormal -         Discharge []  None visible  [] Abnormal -    HENT:   [x] Normocephalic, atraumatic. [] Abnormal   [] Mouth/Throat: Mucous membranes are moist.     External Ears [] Normal  [] Abnormal-     Neck: [] No visualized mass     Pulmonary/Chest: [x] Respiratory effort normal.  [x] No visualized signs of difficulty breathing or respiratory distress        [] Abnormal-      Musculoskeletal:   [] Normal gait with no signs of ataxia         [] Normal range of motion of neck        [] Abnormal-       Neurological:        [x] No Facial Asymmetry (Cranial nerve 7 motor function) (limited exam to video visit)          [] No gaze palsy        [] Abnormal-         Skin:        [] No significant exanthematous lesions or discoloration noted on facial skin         [] Abnormal-            Psychiatric:       [x] Normal Affect [] No Hallucinations        [] Abnormal-     Other pertinent observable physical exam findings-     Due to this being a TeleHealth encounter, evaluation of the following organ systems is limited: Vitals/Constitutional/EENT/Resp/CV/GI//MS/Neuro/Skin/Heme-Lymph-Imm. ASSESSMENT/PLAN:  Tomas Bond was seen today for back pain. Diagnoses and all orders for this visit:    Closed fracture of lumbar vertebra with spinal cord injury, initial encounter ([facility]  -     oxyCODONE-acetaminophen (PERCOCET) 5-325 MG per tablet; Take 1 tablet by mouth every 4 hours as needed for Pain for up to 7 days. Intended supply: 7 days. Take lowest dose possible to manage pain    OARRS report checked    Emergency room information reviewed with patient and daughter. X-ray examinations also reviewed with patient and daughter. She indeed has a new L4 fracture and recommendation at this point time is local measures relief and use pain medication. located in a state where the provider was credentialed to provide care.

## 2021-04-07 NOTE — CARE COORDINATION
RN, ACM Note:  The daughter had left a message for the RN, ACM asking about aide or  services for the pt. The daughter stated the pt had a recent fall and yesterday she was worried the pt would not be okay when the daughter was at work. The daughter stated since then the pt feels she will be okay with her walker until her daughter gets home from work. The RN, ACM suggested the daughter call [employer] to see if the pt qualifies for any home services. The daughter stated she had looked into services when her father was alive but got afraid when [employer] started asking about the home and income. The daughter stated she now knows they ask those questions because the services are income based. The daughter stated she only works a few blocks away from the home so she feels the pt will be okay. The pt is still not interested in Care Coordination.

## 2021-04-07 NOTE — TELEPHONE ENCOUNTER
Patient has fallen and fractured her lumbar spine . She is in pain. Daughter had questions about procedure. Questions answered. She will call and cancel if she feels patient is unable to tolerate the procedure.

## 2021-04-07 NOTE — TELEPHONE ENCOUNTER
Medication:   Requested Prescriptions     Pending Prescriptions Disp Refills    topiramate (TOPAMAX) 50 MG tablet [Pharmacy Med Name: TOPIRAMATE 50 MG Tablet] 360 tablet 0     Sig: TAKE 2 TABLETS TWICE DAILY      Last Filled:     Patient Phone Number: [phone] (home)     Last appt:    Next appt: 5/10/2021    Last OARRS:   RX Monitoring 4/18/2019   Attestation The Prescription Monitoring Report for this patient was reviewed today.      PDMP Monitoring:    Last PDMP Adriana Ferris as Reviewed [facility]):  Review User Review Instant Review Result   Marshall Garza 9/18/2020  2:06 PM Reviewed PDMP [1]     Preferred Pharmacy:   [facility] [address] [facility] [phone] [facility] [phone]  [facility]. [phone]  Phone: [phone] Fax: [phone]    [facility] - [place] [phone] - F [phone]  [address] [place] [address]  Phone: [phone] Fax: [phone]

## 2021-04-08 ENCOUNTER — OFFICE VISIT (OUTPATIENT)
Dept: PRIMARY CARE CLINIC | Age: 81
End: 2021-04-08
Payer: MEDICARE

## 2021-04-08 DIAGNOSIS — Z20.828 EXPOSURE TO SARS-ASSOCIATED CORONAVIRUS: Primary | ICD-10-CM

## 2021-04-08 PROCEDURE — G8420 CALC BMI NORM PARAMETERS: HCPCS | Performed by: NURSE PRACTITIONER

## 2021-04-08 PROCEDURE — 99211 OFF/OP EST MAY X REQ PHY/QHP: CPT | Performed by: NURSE PRACTITIONER

## 2021-04-08 PROCEDURE — G8428 CUR MEDS NOT DOCUMENT: HCPCS | Performed by: NURSE PRACTITIONER

## 2021-04-08 NOTE — PATIENT INSTRUCTIONS

## 2021-04-08 NOTE — PROGRESS NOTES
Carson Rivera received a viral test for COVID-19. They were educated on isolation and quarantine as appropriate. For any symptoms, they were directed to seek care from their PCP, given contact information to establish with a doctor, directed to an urgent care or the emergency room.

## 2021-04-09 LAB — SARS-COV-2: NOT DETECTED

## 2021-04-14 ENCOUNTER — HOSPITAL ENCOUNTER (OUTPATIENT)
Dept: CARDIAC CATH/INVASIVE PROCEDURES | Age: 81
Discharge: HOME | End: 2021-04-14
Attending: INTERNAL MEDICINE | Admitting: INTERNAL MEDICINE
Payer: MEDICARE

## 2021-04-14 VITALS
HEART RATE: 70 BPM | WEIGHT: 124 LBS | OXYGEN SATURATION: 100 % | BODY MASS INDEX: 21.97 KG/M2 | SYSTOLIC BLOOD PRESSURE: 159 MMHG | HEIGHT: 63 IN | DIASTOLIC BLOOD PRESSURE: 66 MMHG | TEMPERATURE: 98 F | RESPIRATION RATE: 16 BRPM

## 2021-04-14 LAB
LV EF: 48 %
LVEF MODALITY: NORMAL

## 2021-04-14 PROCEDURE — 99152 MOD SED SAME PHYS/QHP 5/>YRS: CPT

## 2021-04-14 PROCEDURE — 93325 DOPPLER ECHO COLOR FLOW MAPG: CPT

## 2021-04-14 PROCEDURE — 93320 DOPPLER ECHO COMPLETE: CPT

## 2021-04-14 PROCEDURE — 7100000010 HC PHASE II RECOVERY - FIRST 15 MIN

## 2021-04-14 PROCEDURE — 93312 ECHO TRANSESOPHAGEAL: CPT

## 2021-04-14 NOTE — PROCEDURES
[facility]     Electrophysiology Procedure Note       Date of Procedure: 4/14/2021  Patient's Name: Jenn Miller  YOB: 1940   Medical Record Number: [id number]  Procedure Performed by: Dom Burkett MD    Procedures performed:    Trans-esophageal echocardiography    IV sedation. Start time: 11:21  Stop time: 11:37   Fentanyl: 25  Mcg  Versed: 2    Mg    An independent trained observer pushed medications at my direction    Indication of the procedure: Watchman     Details of procedure: The patient was brought to the cath lab area in a fasting and non-sedated state. The risks, benefits and alternatives of the procedure were discussed with the patient. The patient opted to proceed with the procedure. Written informed consent was signed and placed in the chart. A timeout protocol was completed to identify the patient and the procedure being performed. IV sedation was provided with IV Versed, Fentanyl initially and DONA was performed     Preliminary DONA results are:   EF : 40-45%  Moderate MR  Moderate TR  Mild AI     Appendage size appears suitable for watchman procedure. Will forward to watchman coordinator for arrangement. Full reports to follow. Patient tolerated the procedure and no immediate complications noted.      Dom Burkett MD, MPH  [facility]   Office: [phone]  Fax: [phone]

## 2021-04-14 NOTE — H&P
None  Med Rec:        Continue aggressive med tx                          Continue plavix, no asa due     Cath: 2/3/17 showed 100% prox RCA with bridging collateral, 100% SVG to RCA, 100% prox LAD after D1, D1 stent widely patent, Mild Cx disease, Normal LV FXN--EF 60%--EDP 12 post hydration.               Stable anatomy       Nuc stress 6/28/16: ([facility])   Final Conclusions/Summary  Stress ECG Impressions: No significant ST changes during vasodilator infusion  Summary: - Abnormal moderat risk stress test with moderate size and severity  anterolateral wall ischemia - Normal LV size and systolic function    Lab Results   Component Value Date    LVEF 45 07/19/2019    LVEFMODE Echo 07/19/2019     Lab Results   Component Value Date    TSH 0.90 03/25/2021         Physical Examination:  Vitals:    04/14/21 1111   BP: (!) 159/66   Pulse: 70   Resp: 16   Temp: 98 °F (36.7 °C)   SpO2: 100%      Wt Readings from Last 3 Encounters:   04/14/21 124 lb (56.2 kg)   03/30/21 124 lb (56.2 kg)   03/19/21 124 lb (56.2 kg)       · Constitutional: Oriented. No distress. · Head: Normocephalic and atraumatic. · Mouth/Throat: Oropharynx is clear and moist.   · Eyes: Conjunctivae normal. EOM are normal.   · Neck: Neck supple. No JVD present. · Cardiovascular: Normal rate, regular rhythm, S1&S2. Incision is healing, scab on the incision. Will use gentamicin   · Pulmonary/Chest: Bilateral respiratory sounds. No rhonchi. · Abdominal: Soft. No tenderness. · Musculoskeletal: No tenderness. No edema    · Lymphadenopathy: Has no cervical adenopathy. · Neurological: Alert and oriented. Follows command, No Gross deficit   · Skin: Skin is warm, No rash noted. · Psychiatric: Has a normal behavior       Review of System:  [x] Full ROS obtained and negative except as mentioned in HPI    Prior to Admission medications    Medication Sig Start Date End Date Taking?  Authorizing Provider   oxyCODONE-acetaminophen (PERCOCET) 5-325 MG per tablet Take 1 tablet by mouth every 4 hours as needed for Pain for up to 7 days. Intended supply: 7 days. Take lowest dose possible to manage pain 4/7/21 4/14/21 Yes Donna Estes MD   topiramate (TOPAMAX) 50 MG tablet TAKE 2 TABLETS TWICE DAILY 4/7/21  Yes Donna Estes MD   torsemide (DEMADEX) 10 MG tablet TAKE 1 TABLET EVERY OTHER DAY ALTERNATING WITH  2  TABLETS EVERY OTHER DAY 4/5/21  Yes Donna Estes MD   allopurinol (ZYLOPRIM) 100 MG tablet Take 1 tablet by mouth daily 3/15/21  Yes Donna Estes MD   predniSONE (DELTASONE) 10 MG tablet Take 1 tablet by mouth daily 3/9/21  Yes Donna Estes MD   rivaroxaban (XARELTO) 10 MG TABS tablet Take 13 mg by mouth   Yes Abby Newton MD   metoprolol succinate (TOPROL XL) 25 MG extended release tablet Take 1 tablet by mouth 2 times daily 1/11/21  Yes Donna Estes MD   DULoxetine (CYMBALTA) 60 MG extended release capsule Take 1 capsule by mouth daily 12/8/20  Yes Donna Estes MD   vitamin D (ERGOCALCIFEROL) 1.25 MG (50633 UT) CAPS capsule Take 1 capsule by mouth once a week 11/9/20  Yes Donna Estes MD   levothyroxine (SYNTHROID) 112 MCG tablet TAKE 1 TABLET EVERY DAY 9/8/20  Yes Donna Estes MD   nitroGLYCERIN (NITROLINGUAL) 0.4 MG/SPRAY 0.4 mg spray Place 1 spray under the tongue every 5 minutes as needed for Chest pain 2/4/21   Girma Aguilera MD       Allergies   Allergen Reactions    Aspirin Nausea Only    Ativan [Lorazepam] Other (See Comments)     hallucinations    Diltiazem Anaphylaxis    Sulfa Antibiotics Rash and Hives    Dabigatran Nausea Only     And indigestion  And indigestion    Aka Pradaxa    Lipitor [Atorvastatin] Other (See Comments)     Muscle pains    Mysoline [Primidone]     Nsaids     Other Other (See Comments)     Nitroglycerin patches causes severe headaches       Social History:  Reviewed. reports that she quit smoking about 34 years ago. Her smoking use included cigarettes.  She has a 28.00 pack-year smoking history. She has never used smokeless tobacco. She reports previous alcohol use. She reports that she does not use drugs. Family History:  Reviewed. Reviewed. No family history of SCD. Relevant and available labs, and cardiovascular diagnostics reviewed. Reviewed. I independently reviewed relevant and available cardiac diagnostic tests ECG, CXR, Echo, Stress test, Device interrogation, Holter, CT scan. - The patient is counseled to follow a low salt diet to assure blood pressure remains controlled for cardiovascular risk factor modification.   - The patient is counseled to avoid excess caffeine, and energy drinks as this may exacerbated ectopy and arrhythmia. - The patient is counseled to get regular exercise 3-5 times per week to control cardiovascular risk factors. - The patient is counseled to lose weigt to control cardiovascular risk factors. - The patient is counseled to avoid tobacco use. All questions and concerns were addressed to the patient/family. Alternatives to my treatment were discussed. I have discussed the above stated plan and the patient verbalized understanding and agreed with the plan. NOTE: This report was transcribed using voice recognition software. Every effort was made to ensure accuracy, however, inadvertent computerized transcription errors may be present. Farhana Garcia MD, MPH  A\[facility] [place] [facility]\""[address]   Office: [phone]  Fax: [phone]      H&P Update    I have reviewed the history and physical and examined the patient and updated with relevant changes. Consent: I have discussed with the patient and/or the patient representative the indication, alternatives, and the possible risks and/or complications of the planned procedure and the anesthesia methods. The patient and/or patient representative appear to understand and agree to proceed.     Vitals:    04/14/21 1111   BP: (!) 159/66   Pulse: 70   Resp: 16 unspecified     Degeneration of cervical intervertebral disc     Essential and other specified forms of tremor     Gout     HIGH CHOLESTEROL     History of CVA (cerebrovascular accident)     Hx of blood clots     Hypertension     Influenza 12/23/2017    Mitral valve stenosis and aortic valve insufficiency     Movement disorder     back problems    Pacemaker     Peptic ulcer, unspecified site, unspecified as acute or chronic, without mention of hemorrhage, perforation, or obstruction     Thyroid disease     Unspecified disorder of kidney and ureter     Unspecified hypothyroidism      Past Surgical History:   Procedure Laterality Date    BACK SURGERY      CARDIAC CATHETERIZATION  7/2012    CARDIAC CATHETERIZATION  08/07/2018    Unsuccesful  of RCA    CARDIAC SURGERY      CABG & Cardiac ablation    CHOLECYSTECTOMY      CORONARY ARTERY BYPASS GRAFT  1987    LIMA- Diag/LAD, SVG- RCA    FEMORAL BYPASS Left 8/22/2019    LEFT FEMORAL TO POPLITEAL BYPASS GRAFT performed by Gretchen Lopez MD at [address] [address] FEMORAL-FEMORAL BYPASS GRAFT N/A 8/22/2019    FEMORAL TO FEMORAL BYPASS performed by Gretchen Lopez MD at [facility] [address] Left 03/16/2017    Left hip pinning    JOINT REPLACEMENT      ID NJX AA&/STRD TFRML EPI LUMBAR/SACRAL 1 LEVEL Right 12/3/2018    RIGHT L3 AND L4 LUMBAR TRANSFORAMINAL EPIRUAL STEROID INJECTION WITH FLUOROSCOPY performed by Corby Acosta MD at [facility]  11/04/2014    MARLENY - 3.0 x 28 to the Ist Diag    SHOULDER SURGERY      left     Allergies   Allergen Reactions    Aspirin Nausea Only    Ativan [Lorazepam] Other (See Comments)     hallucinations    Diltiazem Anaphylaxis    Sulfa Antibiotics Rash and Hives    Dabigatran Nausea Only     And indigestion  And indigestion    Aka Pradaxa    Lipitor [Atorvastatin] Other (See Comments)     Muscle pains    Mysoline [Primidone]     Nsaids     Other Other (See Comments)     Nitroglycerin patches causes severe headaches       Pre-Sedation Documentation and Exam:   I have personally completed a history, physical exam & review of systems for this patient (see notes).     Mallampati Airway Assessment:  Class II     Prior History of Anesthesia Complications:   None    ASA Classification:  Class 3 - A patient with severe systemic disease that limits activity but is not incapacitating    Sedation/ Anesthesia Plan:   Intravenous sedation    Medications Planned:   Midazolam (Versed) and Fentanyl intravenously    Patient is an appropriate candidate for plan of sedation:   Yes    Electronically signed by Celia Johnston MD on 4/14/2021 at 11:23 AM

## 2021-04-16 ENCOUNTER — TELEPHONE (OUTPATIENT)
Dept: FAMILY MEDICINE CLINIC | Age: 81
End: 2021-04-16

## 2021-04-16 ENCOUNTER — APPOINTMENT (OUTPATIENT)
Dept: CT IMAGING | Age: 81
End: 2021-04-16
Payer: MEDICARE

## 2021-04-16 ENCOUNTER — HOSPITAL ENCOUNTER (EMERGENCY)
Age: 81
Discharge: HOME | End: 2021-04-16
Payer: MEDICARE

## 2021-04-16 VITALS
TEMPERATURE: 98.2 F | DIASTOLIC BLOOD PRESSURE: 80 MMHG | WEIGHT: 125 LBS | SYSTOLIC BLOOD PRESSURE: 157 MMHG | OXYGEN SATURATION: 97 % | RESPIRATION RATE: 18 BRPM | HEIGHT: 63 IN | HEART RATE: 71 BPM | BODY MASS INDEX: 22.15 KG/M2

## 2021-04-16 DIAGNOSIS — Z91.81 STATUS POST FALL: ICD-10-CM

## 2021-04-16 DIAGNOSIS — S32.040A CLOSED COMPRESSION FRACTURE OF L4 VERTEBRA, INITIAL ENCOUNTER (HCC): Primary | ICD-10-CM

## 2021-04-16 PROCEDURE — 6370000000 HC RX 637 (ALT 250 FOR IP): Performed by: PHYSICIAN ASSISTANT

## 2021-04-16 PROCEDURE — 72131 CT LUMBAR SPINE W/O DYE: CPT

## 2021-04-16 PROCEDURE — 99283 EMERGENCY DEPT VISIT LOW MDM: CPT

## 2021-04-16 RX ORDER — LIDOCAINE 50 MG/G
1 PATCH TOPICAL DAILY
Qty: 30 PATCH | Refills: 0 | Status: SHIPPED | OUTPATIENT
Start: 2021-04-16 | End: 2021-08-19

## 2021-04-16 RX ORDER — LIDOCAINE 4 G/G
1 PATCH TOPICAL ONCE
Status: DISCONTINUED | OUTPATIENT
Start: 2021-04-16 | End: 2021-04-16 | Stop reason: HOSPADM

## 2021-04-16 RX ORDER — HYDROCODONE BITARTRATE AND ACETAMINOPHEN 5; 325 MG/1; MG/1
1 TABLET ORAL ONCE
Status: COMPLETED | OUTPATIENT
Start: 2021-04-16 | End: 2021-04-16

## 2021-04-16 RX ORDER — METHOCARBAMOL 500 MG/1
750 TABLET, FILM COATED ORAL ONCE
Status: COMPLETED | OUTPATIENT
Start: 2021-04-16 | End: 2021-04-16

## 2021-04-16 RX ORDER — METHOCARBAMOL 750 MG/1
750 TABLET, FILM COATED ORAL EVERY 8 HOURS PRN
Qty: 30 TABLET | Refills: 0 | Status: SHIPPED | OUTPATIENT
Start: 2021-04-16 | End: 2021-04-26

## 2021-04-16 RX ORDER — HYDROCODONE BITARTRATE AND ACETAMINOPHEN 5; 325 MG/1; MG/1
1 TABLET ORAL EVERY 6 HOURS PRN
Qty: 15 TABLET | Refills: 0 | Status: SHIPPED | OUTPATIENT
Start: 2021-04-16 | End: 2021-04-19

## 2021-04-16 RX ADMIN — HYDROCODONE BITARTRATE AND ACETAMINOPHEN 1 TABLET: 5; 325 TABLET ORAL at 18:46

## 2021-04-16 RX ADMIN — METHOCARBAMOL 750 MG: 500 TABLET ORAL at 18:46

## 2021-04-16 ASSESSMENT — ENCOUNTER SYMPTOMS
SHORTNESS OF BREATH: 0
ABDOMINAL PAIN: 0
NAUSEA: 0
CHEST TIGHTNESS: 0
DIARRHEA: 0
VOMITING: 0
BACK PAIN: 1

## 2021-04-16 ASSESSMENT — PAIN DESCRIPTION - ORIENTATION: ORIENTATION: LOWER

## 2021-04-16 ASSESSMENT — PAIN DESCRIPTION - DESCRIPTORS: DESCRIPTORS: THROBBING;STABBING

## 2021-04-16 ASSESSMENT — PAIN DESCRIPTION - LOCATION: LOCATION: BACK

## 2021-04-16 NOTE — TELEPHONE ENCOUNTER
Spoke with patient, she's scheduled to see SW on Mon 4/19/21 for severe back pain. Patient advised if pain is severe or she feels this can't wait till Monday to please go to the ED for evaluation.

## 2021-04-16 NOTE — TELEPHONE ENCOUNTER
----- Message from Rosanna Turpin sent at 4/16/2021  2:07 PM EDT -----  Subject: Message to Provider    QUESTIONS  Information for Provider? pt said pcp said to call if she wasnt getting   better, pt said she is getting worse, says she is having pain all the   time. scheduled with isabella on monday 4/19   ---------------------------------------------------------------------------  --------------  CALL BACK INFO  What is the best way for the office to contact you? OK to leave message on   voicemail  Preferred Call Back Phone Number? [phone]  ---------------------------------------------------------------------------  --------------  SCRIPT ANSWERS  Relationship to Patient?  Self

## 2021-04-16 NOTE — ED PROVIDER NOTES
Dabigatran, Lipitor [atorvastatin], Mysoline [primidone], Nsaids, and Other    FAMILYHISTORY       Family History   Problem Relation Age of Onset    Cancer Sister     Heart Disease Sister     Diabetes Brother     Hypertension Brother     Heart Disease Brother     Stroke Maternal Aunt     Heart Disease Maternal Aunt     Diabetes Maternal Uncle     Hypertension Paternal Aunt     Cancer Maternal Grandmother     Cancer Paternal Grandmother     Rheum Arthritis Neg Hx     Lupus Neg Hx           SOCIAL HISTORY       Social History     Tobacco Use    Smoking status: Former Smoker     Packs/day: 1.00     Years: 28.00     Pack years: 28.00     Types: Cigarettes     Quit date: 1987     Years since quittin.3    Smokeless tobacco: Never Used    Tobacco comment: H.O.smoking at age 15 / smoked up to 1 p.p.d / quit    Substance Use Topics    Alcohol use: Not Currently     Alcohol/week: 0.0 standard drinks    Drug use: No       SCREENINGS             PHYSICAL EXAM    (up to 7 for level 4, 8 or more for level 5)     ED Triage Vitals [21 1718]   BP Temp Temp Source Pulse Resp SpO2 Height Weight   (!) 157/80 98.2 °F (36.8 °C) Oral 71 18 97 % 5' 3\" (1.6 m) 125 lb (56.7 kg)       Physical Exam  Vitals signs and nursing note reviewed. Constitutional:       General: She is awake. She is not in acute distress. Appearance: Normal appearance. She is well-developed. She is ill-appearing (chronically). She is not diaphoretic. HENT:      Head: Normocephalic and atraumatic. No raccoon eyes, Helms's sign, contusion or laceration. Right Ear: Hearing and external ear normal.      Left Ear: Hearing and external ear normal.      Nose: Nose normal.      Mouth/Throat:      Lips: Pink. Mouth: Mucous membranes are moist.   Eyes:      General: No scleral icterus. Right eye: No discharge. Left eye: No discharge.       Conjunctiva/sclera: Conjunctivae normal.   Neck:      Musculoskeletal: Normal range of motion. Vascular: No JVD. Cardiovascular:      Rate and Rhythm: Normal rate and regular rhythm. Heart sounds: No murmur. No friction rub. No gallop. Pulmonary:      Effort: Pulmonary effort is normal. No accessory muscle usage or respiratory distress. Breath sounds: Normal breath sounds. No wheezing, rhonchi or rales. Abdominal:      General: There is no distension. Palpations: Abdomen is soft. Abdomen is not rigid. There is no mass. Tenderness: There is no abdominal tenderness. There is no guarding or rebound. Musculoskeletal:      Lumbar back: She exhibits tenderness, bony tenderness and pain. She exhibits normal range of motion, no swelling, no edema, no deformity, no laceration, no spasm and normal pulse. Back:       Comments: She does have focal tenderness in and around the region of L3 as well as L4. There is no step-off. Patient has a negative straight leg raise. The patient demonstrates 5/5 muscle strength to hip flexion, knee flexion/extension, plantar/dorsiflexion of the ankle and extensor hallicus longus testing. Skin:     General: Skin is warm and dry. Neurological:      Mental Status: She is alert and oriented to person, place, and time. GCS: GCS eye subscore is 4. GCS verbal subscore is 5. GCS motor subscore is 6. Cranial Nerves: No cranial nerve deficit. Sensory: No sensory deficit. Coordination: Coordination normal.   Psychiatric:         Behavior: Behavior normal. Behavior is cooperative. DIAGNOSTIC RESULTS   LABS:    Labs Reviewed - No data to display    All other labs were within normal range or not returned as of this dictation. EKG: All EKG's are interpreted by the Emergency Department Physician in the absence of a cardiologist.  Please see their note for interpretation of EKG.       RADIOLOGY:   Non-plain film images such as CT, Ultrasound and MRI are read by the radiologist. Plain radiographic images ------------------------------------------------------------------  Electronically signed by Lacy Velasquez MD (Interpreting  physician) on 2021 at 03:50 PM  ------------------------------------------------------------------   Findings   Left Ventricle  Overall left ventricular systolic function appears mildly reduced. Ejection fraction is visually estimated to be 45-50%. Mitral Valve  Decreased mobility of posterior mitral valve leaflet. Moderate mitral regurgitation with calculated ERO of 0.26 cm2. Left Atrium  There is no evidence of mass or thrombus in the left atrium or appendage. VIELKA measurements:  52 degrees: 2.21 cm x 2.76 cm. 96 degrees: 2.23 cm x 2.72 cm. 121 degrees: 1.99 cm x 2.34 cm. There is no evidence of mass or thrombus in the left atrium or appendage. The left atrium is dilated. Aortic Valve  Aortic valve appears to be tricuspid. Mild aortic regurgitation. Aorta  The aortic root is normal in size. Right Ventricle  Normal right ventricular size. Right ventricular systolic function is mildly reduced . Tricuspid Valve  Moderate tricuspid regurgitation. Estimated right ventricular systolic function is 47 mmHg assuming right  atrial pressure of 5 mmHg. Right Atrium  The right atrial size is normal.   Pulmonic Valve  Pulmonic valve appears to be normal in structure and function. Trivial pulmonic regurgitation. Pericardial Effusion  No pericardial effusion noted.   Doppler Measurements                          MV Max P mmHg  TR Velocity:324 cm/s   MV Vmax:592 cm/s  TR Gradient:41.99 mmHg            PROCEDURES   Unless otherwise noted below, none     Procedures    CRITICAL CARE TIME   N/A    CONSULTS:  None      EMERGENCY DEPARTMENT COURSE and DIFFERENTIAL DIAGNOSIS/MDM:   Vitals:    Vitals:    21 1718   BP: (!) 157/80   Pulse: 71   Resp: 18   Temp: 98.2 °F (36.8 °C)   TempSrc: Oral   SpO2: 97%   Weight: 125 lb (56.7 kg)   Height: 5' 3\" (1.6 m)       Patient was given the following medications:  Medications   lidocaine 4 % external patch 1 patch (1 patch Transdermal Patch Applied 4/16/21 1749)   HYDROcodone-acetaminophen (NORCO) 5-325 MG per tablet 1 tablet (1 tablet Oral Given 4/16/21 1846)   methocarbamol (ROBAXIN) tablet 750 mg (750 mg Oral Given 4/16/21 1846)           The patient's detailed history of present illness is documented as above. Upon arrival to the emergency department the patient's vital signs are as documented. The patient is noted to be hemodynamically stable and afebrile. Physical examination findings are as above. Because of the pain and discomfort the patient was experiencing she was medicated as above. CT imaging of the lumbar spine is completed and does confirm suspicions where the patient has an acute anterior wedging superior endplate L4 fracture. Osteoporotic appearance to the bones with no evidence of lumbar listhesis. Degenerative changes otherwise noted in the lumbar spine. The above mention was discussed with the patient in detail. Unfortunately she struggling in the home environment. I do believe that she would benefit from an LSO brace addition to Robaxin to the Vicodin that she is on by Dr. Horace See as well as Lidoderm pain patches. Follow-up on an outpatient basis with neuro to ensure resolution of symptoms more than anything else. I estimate there is low risk for cauda equina or central cord compression syndrome, epidural lesion or abscess, meningitis or acute/severe spinal stenosis requiring emergent treatment and or any additional pathology that would require further emergency advanced imaging or admission and therefor I consider the discharge disposition most reasonable. The patient and/or family and I have discussed the diagnosis and risks, and we agree with discharging home to follow-up with their primary doctor. We also discussed returning to the emergency department immediately if new or worsening symptoms occur.  We have no known allergies

## 2021-04-19 ENCOUNTER — TELEPHONE (OUTPATIENT)
Dept: FAMILY MEDICINE CLINIC | Age: 81
End: 2021-04-19

## 2021-04-26 RX ORDER — LEVOTHYROXINE SODIUM 112 UG/1
TABLET ORAL
Qty: 90 TABLET | Refills: 1 | Status: SHIPPED | OUTPATIENT
Start: 2021-04-26 | End: 2021-08-30

## 2021-04-26 NOTE — TELEPHONE ENCOUNTER
Medication:   Requested Prescriptions     Pending Prescriptions Disp Refills    levothyroxine (SYNTHROID) 112 MCG tablet [Pharmacy Med Name: LEVOTHYROXINE SODIUM 112 MCG Tablet] 90 tablet 1     Sig: TAKE 1 TABLET EVERY DAY       Patient Phone Number: [phone] (home)     Last appt: 4/7/2021   Next appt: 5/10/2021    Last OARRS:   RX Monitoring 4/18/2019   Attestation The Prescription Monitoring Report for this patient was reviewed today.      PDMP Monitoring:    Last PDMP Adrián Nuñez as Reviewed [facility]):  Review User Review Instant Review Result   Brian Klein 9/18/2020  2:06 PM Reviewed PDMP [1]     Preferred Pharmacy:   [facility] [facility] [phone] Ryland Zamudio [phone]  [facility] [address]. [phone]  Phone: [phone] Fax: [phone]    [facility] - [address] [phone] - F [phone]  [address] [place] [address]  Phone: [phone] Fax: [phone]

## 2021-04-27 RX ORDER — CHLORHEXIDINE GLUCONATE 4 G/100ML
SOLUTION TOPICAL
Status: CANCELLED | OUTPATIENT
Start: 2021-04-27 | End: 2021-05-11

## 2021-04-27 NOTE — TELEPHONE ENCOUNTER
Spoke with patient daughter today and informed her of the procedure that is being scheduled on 5/17/2021 at 10 AM arrival.  Lab work has been order instructed to have done at [facility] on 5/12/2021. Also informed to get Hibiclens bath at their Pharmacy. All procedure instructions were sent to patients daughters Sylvia's e-mail [email or web address]  Instructed  to contact me with any questions prior to procedure.

## 2021-04-28 ENCOUNTER — TELEPHONE (OUTPATIENT)
Dept: FAMILY MEDICINE CLINIC | Age: 81
End: 2021-04-28

## 2021-04-28 DIAGNOSIS — J20.9 ACUTE BRONCHITIS, UNSPECIFIED ORGANISM: ICD-10-CM

## 2021-04-28 RX ORDER — CEFDINIR 300 MG/1
300 CAPSULE ORAL 2 TIMES DAILY
Qty: 14 CAPSULE | Refills: 0 | Status: SHIPPED | OUTPATIENT
Start: 2021-04-28 | End: 2021-05-05

## 2021-04-28 NOTE — TELEPHONE ENCOUNTER
[facility] [address] [facility] [phone] Milagros Gurpreet [phone]   [address] [address]   Phone:  [phone]  Fax:  [phone]        Patient has had a cough for about 2 weeks and would like to know if you will call in an anti-biotic for her.   She has no other symptoms

## 2021-04-30 RX ORDER — CHLORHEXIDINE GLUCONATE 4 G/100ML
SOLUTION TOPICAL
Qty: 1 BOTTLE | Refills: 0 | Status: SHIPPED
Start: 2021-05-12 | End: 2021-05-17 | Stop reason: HOSPADM

## 2021-05-03 DIAGNOSIS — Z01.818 PRE-OP TESTING: Primary | ICD-10-CM

## 2021-05-07 ENCOUNTER — TELEPHONE (OUTPATIENT)
Dept: FAMILY MEDICINE CLINIC | Age: 81
End: 2021-05-07

## 2021-05-07 ENCOUNTER — TELEPHONE (OUTPATIENT)
Dept: CARDIOLOGY CLINIC | Age: 81
End: 2021-05-07

## 2021-05-07 NOTE — TELEPHONE ENCOUNTER
This will need to be approved out by cardiology as she is planned to have a watchman procedure in the near future

## 2021-05-07 NOTE — LETTER
[facility] CARDIOLOGY[address]  Dept: [phone]  Dept Fax: Laurence Abdul 82  1940  5/7/21    To Whom It May Concern:    Discontinuation of any anticoagulant carries significant risks of morbidity, sometimes with a fatal outcome (e.g. stroke), from thromboembolic complications. Guidelines do not recommend discontinuation of anticoagulation for low risk surgical procedure, such as cataract surgery, dental procedures, and dermatologic surgeries. The risks of hemorrhage or thromboembolism versus the benefit from the operation need to be addressed by attending surgeon. Xarelto: \"If anticoagulation must be discontinued to reduce the risk of bleeding with surgical or other procedures, Leonetta [place] should be stopped at least 24 hours before the procedure to reduce the risk of bleeding. In deciding whether a procedure should be delayed until 24 hours after the last dose of XARELTO, the increased risk of bleeding should be weighed against the urgency of intervention. Leonetta Jose should be restarted after the surgical or other procedures as soon as adequate hemostasis has been established, noting that the time to onset of  therapeutic effect is short. If oral medication cannot be taken during or after surgical intervention, consider administering a parenteral anticoagulant. \"    Britt Becerril may hold Xarelto for 2 days prior to his/her scheduled procedure. If more days are desired, it is up to the requesting physician to appropriately bridge the patient with an alternative medication. Please limit time off anticoagulation and resume medication as soon as possible. Please contact my office with any further questions or concerns.        Sincerely,        Chetna Puga MD

## 2021-05-10 ENCOUNTER — TELEPHONE (OUTPATIENT)
Dept: CARDIOLOGY CLINIC | Age: 81
End: 2021-05-10

## 2021-05-10 NOTE — TELEPHONE ENCOUNTER
She having a back procedure on 5/14/21 . The doctor at [facility] wants her to hold her xarelto starting today . Is holding her xarelto starting today ok ?

## 2021-05-12 ENCOUNTER — HOSPITAL ENCOUNTER (OUTPATIENT)
Age: 81
Discharge: HOME | End: 2021-05-12
Payer: MEDICARE

## 2021-05-12 LAB
ABO/RH: NORMAL
ANION GAP SERPL CALCULATED.3IONS-SCNC: 16 MMOL/L (ref 3–16)
ANTIBODY SCREEN: NORMAL
BILIRUBIN URINE: NEGATIVE
BLOOD, URINE: NEGATIVE
BUN BLDV-MCNC: 44 MG/DL (ref 7–20)
CALCIUM SERPL-MCNC: 8.8 MG/DL (ref 8.3–10.6)
CHLORIDE BLD-SCNC: 108 MMOL/L (ref 99–110)
CLARITY: CLEAR
CO2: 19 MMOL/L (ref 21–32)
COLOR: YELLOW
CREAT SERPL-MCNC: 1.9 MG/DL (ref 0.6–1.2)
GFR AFRICAN AMERICAN: 31
GFR NON-AFRICAN AMERICAN: 25
GLUCOSE BLD-MCNC: 63 MG/DL (ref 70–99)
GLUCOSE URINE: NEGATIVE MG/DL
HCT VFR BLD CALC: 42 % (ref 36–48)
HEMOGLOBIN: 13.2 G/DL (ref 12–16)
KETONES, URINE: NEGATIVE MG/DL
LEUKOCYTE ESTERASE, URINE: NEGATIVE
MCH RBC QN AUTO: 26.8 PG (ref 26–34)
MCHC RBC AUTO-ENTMCNC: 31.4 G/DL (ref 31–36)
MCV RBC AUTO: 85.2 FL (ref 80–100)
MICROSCOPIC EXAMINATION: NORMAL
NITRITE, URINE: NEGATIVE
PDW BLD-RTO: 22.7 % (ref 12.4–15.4)
PH UA: 6 (ref 5–8)
PLATELET # BLD: 272 K/UL (ref 135–450)
PMV BLD AUTO: 9.1 FL (ref 5–10.5)
POTASSIUM SERPL-SCNC: 4.4 MMOL/L (ref 3.5–5.1)
PROTEIN UA: NEGATIVE MG/DL
RBC # BLD: 4.92 M/UL (ref 4–5.2)
SODIUM BLD-SCNC: 143 MMOL/L (ref 136–145)
SPECIFIC GRAVITY UA: 1.02 (ref 1–1.03)
URINE TYPE: NORMAL
UROBILINOGEN, URINE: 0.2 E.U./DL
WBC # BLD: 12.4 K/UL (ref 4–11)

## 2021-05-12 PROCEDURE — 85027 COMPLETE CBC AUTOMATED: CPT

## 2021-05-12 PROCEDURE — 86901 BLOOD TYPING SEROLOGIC RH(D): CPT

## 2021-05-12 PROCEDURE — 86850 RBC ANTIBODY SCREEN: CPT

## 2021-05-12 PROCEDURE — 80048 BASIC METABOLIC PNL TOTAL CA: CPT

## 2021-05-12 PROCEDURE — 81003 URINALYSIS AUTO W/O SCOPE: CPT

## 2021-05-12 PROCEDURE — 86900 BLOOD TYPING SEROLOGIC ABO: CPT

## 2021-05-12 PROCEDURE — 36415 COLL VENOUS BLD VENIPUNCTURE: CPT

## 2021-05-14 ENCOUNTER — HOSPITAL ENCOUNTER (OUTPATIENT)
Dept: INTERVENTIONAL RADIOLOGY/VASCULAR | Age: 81
Discharge: HOME | End: 2021-05-14
Payer: MEDICARE

## 2021-05-14 ENCOUNTER — TELEPHONE (OUTPATIENT)
Dept: CARDIOLOGY CLINIC | Age: 81
End: 2021-05-14

## 2021-05-14 VITALS
TEMPERATURE: 97.1 F | WEIGHT: 125 LBS | SYSTOLIC BLOOD PRESSURE: 124 MMHG | HEART RATE: 68 BPM | OXYGEN SATURATION: 95 % | BODY MASS INDEX: 22.15 KG/M2 | RESPIRATION RATE: 16 BRPM | HEIGHT: 63 IN | DIASTOLIC BLOOD PRESSURE: 54 MMHG

## 2021-05-14 DIAGNOSIS — S22.040A COMPRESSION FRACTURE OF T4 VERTEBRA, INITIAL ENCOUNTER (HCC): ICD-10-CM

## 2021-05-14 LAB
APTT: 30.8 SEC (ref 24.2–36.2)
INR BLD: 1.01 (ref 0.86–1.14)
PROTHROMBIN TIME: 11.7 SEC (ref 10–13.2)

## 2021-05-14 PROCEDURE — 6360000004 HC RX CONTRAST MEDICATION: Performed by: RADIOLOGY

## 2021-05-14 PROCEDURE — 2580000003 HC RX 258: Performed by: RADIOLOGY

## 2021-05-14 PROCEDURE — 85730 THROMBOPLASTIN TIME PARTIAL: CPT

## 2021-05-14 PROCEDURE — 85610 PROTHROMBIN TIME: CPT

## 2021-05-14 PROCEDURE — 36415 COLL VENOUS BLD VENIPUNCTURE: CPT

## 2021-05-14 PROCEDURE — 7100000010 HC PHASE II RECOVERY - FIRST 15 MIN

## 2021-05-14 PROCEDURE — 2500000003 HC RX 250 WO HCPCS: Performed by: RADIOLOGY

## 2021-05-14 PROCEDURE — 99153 MOD SED SAME PHYS/QHP EA: CPT

## 2021-05-14 PROCEDURE — C1894 INTRO/SHEATH, NON-LASER: HCPCS

## 2021-05-14 PROCEDURE — 22514 PERQ VERTEBRAL AUGMENTATION: CPT

## 2021-05-14 PROCEDURE — 99152 MOD SED SAME PHYS/QHP 5/>YRS: CPT

## 2021-05-14 PROCEDURE — 6360000002 HC RX W HCPCS: Performed by: RADIOLOGY

## 2021-05-14 PROCEDURE — 7100000011 HC PHASE II RECOVERY - ADDTL 15 MIN

## 2021-05-14 RX ORDER — ONDANSETRON 2 MG/ML
4 INJECTION INTRAMUSCULAR; INTRAVENOUS EVERY 8 HOURS PRN
Status: DISCONTINUED | OUTPATIENT
Start: 2021-05-14 | End: 2021-05-15 | Stop reason: HOSPADM

## 2021-05-14 RX ORDER — FENTANYL CITRATE 50 UG/ML
INJECTION, SOLUTION INTRAMUSCULAR; INTRAVENOUS
Status: COMPLETED | OUTPATIENT
Start: 2021-05-14 | End: 2021-05-14

## 2021-05-14 RX ORDER — KETOROLAC TROMETHAMINE 30 MG/ML
INJECTION, SOLUTION INTRAMUSCULAR; INTRAVENOUS
Status: COMPLETED | OUTPATIENT
Start: 2021-05-14 | End: 2021-05-14

## 2021-05-14 RX ORDER — SODIUM CHLORIDE 9 MG/ML
INJECTION, SOLUTION INTRAVENOUS CONTINUOUS
Status: DISCONTINUED | OUTPATIENT
Start: 2021-05-14 | End: 2021-05-15 | Stop reason: HOSPADM

## 2021-05-14 RX ORDER — LIDOCAINE HYDROCHLORIDE 10 MG/ML
20 INJECTION, SOLUTION EPIDURAL; INFILTRATION; INTRACAUDAL; PERINEURAL ONCE
Status: COMPLETED | OUTPATIENT
Start: 2021-05-14 | End: 2021-05-14

## 2021-05-14 RX ORDER — HYDROCODONE BITARTRATE AND ACETAMINOPHEN 5; 325 MG/1; MG/1
1 TABLET ORAL EVERY 4 HOURS PRN
Status: DISCONTINUED | OUTPATIENT
Start: 2021-05-14 | End: 2021-05-15 | Stop reason: HOSPADM

## 2021-05-14 RX ORDER — HYDROCODONE BITARTRATE AND ACETAMINOPHEN 5; 325 MG/1; MG/1
2 TABLET ORAL EVERY 4 HOURS PRN
Status: DISCONTINUED | OUTPATIENT
Start: 2021-05-14 | End: 2021-05-15 | Stop reason: HOSPADM

## 2021-05-14 RX ORDER — BUPIVACAINE HYDROCHLORIDE 5 MG/ML
30 INJECTION, SOLUTION EPIDURAL; INTRACAUDAL ONCE
Status: COMPLETED | OUTPATIENT
Start: 2021-05-14 | End: 2021-05-14

## 2021-05-14 RX ORDER — DIPHENHYDRAMINE HYDROCHLORIDE 50 MG/ML
INJECTION INTRAMUSCULAR; INTRAVENOUS
Status: COMPLETED | OUTPATIENT
Start: 2021-05-14 | End: 2021-05-14

## 2021-05-14 RX ORDER — ACETAMINOPHEN 325 MG/1
650 TABLET ORAL EVERY 4 HOURS PRN
Status: DISCONTINUED | OUTPATIENT
Start: 2021-05-14 | End: 2021-05-15 | Stop reason: HOSPADM

## 2021-05-14 RX ADMIN — IOPAMIDOL 40 ML: 408 INJECTION, SOLUTION INTRAVASCULAR at 09:48

## 2021-05-14 RX ADMIN — DIPHENHYDRAMINE HYDROCHLORIDE 12.5 MG: 50 INJECTION, SOLUTION INTRAMUSCULAR; INTRAVENOUS at 09:21

## 2021-05-14 RX ADMIN — KETOROLAC TROMETHAMINE 15 MG: 30 INJECTION, SOLUTION INTRAMUSCULAR; INTRAVENOUS at 09:08

## 2021-05-14 RX ADMIN — LIDOCAINE HYDROCHLORIDE 10 ML: 10 INJECTION, SOLUTION EPIDURAL; INFILTRATION; INTRACAUDAL; PERINEURAL at 09:00

## 2021-05-14 RX ADMIN — FENTANYL CITRATE 50 MCG: 50 INJECTION, SOLUTION INTRAMUSCULAR; INTRAVENOUS at 09:05

## 2021-05-14 RX ADMIN — BUPIVACAINE HYDROCHLORIDE 50 MG: 5 INJECTION, SOLUTION EPIDURAL; INTRACAUDAL at 09:00

## 2021-05-14 RX ADMIN — CEFAZOLIN 2000 MG: 1 INJECTION, POWDER, FOR SOLUTION INTRAVENOUS at 08:59

## 2021-05-14 RX ADMIN — FENTANYL CITRATE 50 MCG: 50 INJECTION, SOLUTION INTRAMUSCULAR; INTRAVENOUS at 09:14

## 2021-05-14 RX ADMIN — FENTANYL CITRATE 50 MCG: 50 INJECTION, SOLUTION INTRAMUSCULAR; INTRAVENOUS at 09:22

## 2021-05-14 NOTE — TELEPHONE ENCOUNTER
Spoke to patient's daughter and patient does not feel safe about holding the Xarelto the day of the watchman procedure. Should she hold the Xarelto the day before the procedure? And will she take the Xarelto the day after? Patient is very concerned.   Please advise

## 2021-05-14 NOTE — PROGRESS NOTES
Patient elevated all the way in bed site still remains the same. Patient denies any pain. VSS. Patient  Tolerating po fluids and food. Request me to call daughter.

## 2021-05-14 NOTE — PROGRESS NOTES
MD in room talking to patient, ok to d/c. D/c paperwork went over with patient and daughter all questions answered. Patients IV removed. Site remains the same, no changes. Daughter to get car. Patient wheeled to daughter car.

## 2021-05-14 NOTE — BRIEF OP NOTE
Brief Operative Note      Patient: Jodee Lindsey MRN: [id number]     YOB: 1940  Age: [de-identified] y.o. Sex: female        DATE OF PROCEDURE: 5/14/2021     PROCEDURE PERFORMED: L4 Kyphoplasty    SURGEON: Sung Mina MD    ANESTHESIA: Fentanyl    Estimated Blood Loss:less than 10 cc    Complications: None. Device implanted: cement.            Specimen: none    Electronically signed by Sung Mina MD on 5/14/2021 at 9:35 AM

## 2021-05-14 NOTE — TELEPHONE ENCOUNTER
She was seen in [facility] ER in April and her xarelto was decreased to 10mg (it was 13mg ). She has no idea who sent rx to her mail order pharmacy but she just got a refill in the mail but it is the wrong strength. What should she do ?  Asking to speak to [facility] [place]

## 2021-05-14 NOTE — PRE SEDATION
Sedation Pre-Procedure Note    Patient Name: Catrina Kuhn   YOB: 1940  Room/Bed: Room/bed info not found  Medical Record Number: [id number]  Date: 5/14/2021   Time: 8:24 AM       Indication:  Kyphoplasty L4 acute compression fracture    Consent: I have discussed with the patient and/or the patient representative the indication, alternatives, and the possible risks and/or complications of the planned procedure and the anesthesia methods. The patient and/or patient representative appear to understand and agree to proceed. Vital Signs: There were no vitals filed for this visit. Past Medical History:   has a past medical history of Allergic rhinitis, cause unspecified, Arthritis, Atrial fibrillation ([address] Bronchopneumonia, CAD (coronary artery disease), Cerebral artery occlusion with cerebral infarction ([address] Chronic gouty arthropathy, Chronic kidney disease, Congestive heart failure, unspecified, Degeneration of cervical intervertebral disc, Essential and other specified forms of tremor, Gout, HIGH CHOLESTEROL, History of CVA (cerebrovascular accident), Hx of blood clots, Hypertension, Influenza, Mitral valve stenosis and aortic valve insufficiency, Movement disorder, Pacemaker, Peptic ulcer, unspecified site, unspecified as acute or chronic, without mention of hemorrhage, perforation, or obstruction, Thyroid disease, Unspecified disorder of kidney and ureter, and Unspecified hypothyroidism. Past Surgical History:   has a past surgical history that includes back surgery; Cholecystectomy; Cardiac surgery; Coronary artery bypass graft (1987); shoulder surgery; Cardiac catheterization (7/2012); hip surgery (Left, 03/16/2017); joint replacement; Cardiac catheterization (08/07/2018); Percutaneous Transluminal Coronary Angio (11/04/2014); pr njx aa&/strd tfrml epi lumbar/sacral 1 level (Right, 12/3/2018);  Femoral-femoral Bypass Graft (N/A, 8/22/2019); and femoral bypass (Left, 8/22/2019). Medications:   Scheduled Meds:   Continuous Infusions:   PRN Meds:   Home Meds:   Prior to Admission medications    Medication Sig Start Date End Date Taking? Authorizing Provider   chlorhexidine (HIBICLENS) 4 % external liquid Wash from neck down the night before or morning of the procedure 5/12/21 5/31/21  Jose J Gutierrez MD   levothyroxine (SYNTHROID) 112 MCG tablet TAKE 1 TABLET EVERY DAY 4/26/21   Brock Darling MD   lidocaine (LIDODERM) 5 % Place 1 patch onto the skin daily 12 hours on, 12 hours off. 4/16/21   Angeline Rausch PA-C   topiramate (TOPAMAX) 50 MG tablet TAKE 2 TABLETS TWICE DAILY 4/7/21   Brock Darling MD   torsemide (DEMADEX) 10 MG tablet TAKE 1 TABLET EVERY OTHER DAY ALTERNATING WITH  2  TABLETS EVERY OTHER DAY 4/5/21   Brock Darling MD   allopurinol (ZYLOPRIM) 100 MG tablet Take 1 tablet by mouth daily 3/15/21   Brock Darling MD   predniSONE (DELTASONE) 10 MG tablet Take 1 tablet by mouth daily 3/9/21   Brock Darling MD   nitroGLYCERIN (NITROLINGUAL) 0.4 MG/SPRAY 0.4 mg spray Place 1 spray under the tongue every 5 minutes as needed for Chest pain 2/4/21   Radha Reyez MD   rivaroxaban (XARELTO) 10 MG TABS tablet Take 13 mg by mouth    Historical Provider, MD   metoprolol succinate (TOPROL XL) 25 MG extended release tablet Take 1 tablet by mouth 2 times daily 1/11/21   Brock Darling MD   DULoxetine (CYMBALTA) 60 MG extended release capsule Take 1 capsule by mouth daily 12/8/20   Brock Darling MD   vitamin D (ERGOCALCIFEROL) 1.25 MG (64968 UT) CAPS capsule Take 1 capsule by mouth once a week 11/9/20   Brock Darling MD     Coumadin Use Last 7 Days:  no  Antiplatelet drug therapy use last 7 days: no  Other anticoagulant use last 7 days: no  Additional Medication Information:  na      Pre-Sedation Documentation and Exam:   I have reviewed the patient's history and review of systems.     Mallampati Airway Assessment:  Mallampati Class II - (soft

## 2021-05-14 NOTE — PROGRESS NOTES
Patient post procedure. Denies pain. VSS. Patient denies needs at this time. 2 Sites with a spot of blood on each.

## 2021-05-14 NOTE — TELEPHONE ENCOUNTER
Chart reviewed. S/p Watchman implantation with Dr. Garcia Pickkathleen on 4/14/21    She should be taking Xarelto 15 mg daily (CrCl <50 ml/min). New rx sent to pharmacy.     TIFFANIE Lam-CNP

## 2021-05-14 NOTE — PROGRESS NOTES
Patient getting dressed with daughter beside her. Sites still remains. Patient denies any pain with ambulating. Same. Patient waiting for MD to to talk to her and daughter.

## 2021-05-17 ENCOUNTER — ANESTHESIA EVENT (OUTPATIENT)
Dept: CARDIAC CATH/INVASIVE PROCEDURES | Age: 81
End: 2021-05-17

## 2021-05-17 ENCOUNTER — ANESTHESIA (OUTPATIENT)
Dept: CARDIAC CATH/INVASIVE PROCEDURES | Age: 81
End: 2021-05-17

## 2021-05-17 ENCOUNTER — APPOINTMENT (OUTPATIENT)
Dept: GENERAL RADIOLOGY | Age: 81
DRG: 274 | End: 2021-05-17
Attending: INTERNAL MEDICINE
Payer: MEDICARE

## 2021-05-17 ENCOUNTER — HOSPITAL ENCOUNTER (INPATIENT)
Dept: CARDIAC CATH/INVASIVE PROCEDURES | Age: 81
LOS: 2 days | Discharge: HOME | DRG: 274 | End: 2021-05-19
Attending: INTERNAL MEDICINE | Admitting: INTERNAL MEDICINE
Payer: MEDICARE

## 2021-05-17 VITALS
TEMPERATURE: 96.8 F | OXYGEN SATURATION: 100 % | SYSTOLIC BLOOD PRESSURE: 117 MMHG | DIASTOLIC BLOOD PRESSURE: 56 MMHG | RESPIRATION RATE: 4 BRPM

## 2021-05-17 DIAGNOSIS — Z95.818 PRESENCE OF WATCHMAN LEFT ATRIAL APPENDAGE CLOSURE DEVICE: ICD-10-CM

## 2021-05-17 DIAGNOSIS — I50.9 HEART FAILURE, UNSPECIFIED HF CHRONICITY, UNSPECIFIED HEART FAILURE TYPE (HCC): ICD-10-CM

## 2021-05-17 DIAGNOSIS — D50.9 IRON DEFICIENCY ANEMIA, UNSPECIFIED IRON DEFICIENCY ANEMIA TYPE: Primary | ICD-10-CM

## 2021-05-17 DIAGNOSIS — I48.0 PAF (PAROXYSMAL ATRIAL FIBRILLATION) (HCC): ICD-10-CM

## 2021-05-17 LAB
ABO/RH: NORMAL
ANION GAP SERPL CALCULATED.3IONS-SCNC: 10 MMOL/L (ref 3–16)
ANTIBODY SCREEN: NORMAL
BUN BLDV-MCNC: 44 MG/DL (ref 7–20)
CALCIUM SERPL-MCNC: 8.8 MG/DL (ref 8.3–10.6)
CHLORIDE BLD-SCNC: 112 MMOL/L (ref 99–110)
CO2: 22 MMOL/L (ref 21–32)
CREAT SERPL-MCNC: 1.4 MG/DL (ref 0.6–1.2)
EKG ATRIAL RATE: 70 BPM
EKG DIAGNOSIS: NORMAL
EKG P-R INTERVAL: 238 MS
EKG Q-T INTERVAL: 408 MS
EKG QRS DURATION: 94 MS
EKG QTC CALCULATION (BAZETT): 440 MS
EKG R AXIS: -3 DEGREES
EKG T AXIS: 60 DEGREES
EKG VENTRICULAR RATE: 70 BPM
GFR AFRICAN AMERICAN: 44
GFR NON-AFRICAN AMERICAN: 36
GLUCOSE BLD-MCNC: 149 MG/DL (ref 70–99)
HCT VFR BLD CALC: 41.7 % (ref 36–48)
HEMOGLOBIN: 13.1 G/DL (ref 12–16)
MCH RBC QN AUTO: 26.9 PG (ref 26–34)
MCHC RBC AUTO-ENTMCNC: 31.4 G/DL (ref 31–36)
MCV RBC AUTO: 85.4 FL (ref 80–100)
PDW BLD-RTO: 22.5 % (ref 12.4–15.4)
PLATELET # BLD: 261 K/UL (ref 135–450)
PMV BLD AUTO: 9.3 FL (ref 5–10.5)
POC ACT LR: 230 SEC
POTASSIUM SERPL-SCNC: 4.9 MMOL/L (ref 3.5–5.1)
RBC # BLD: 4.88 M/UL (ref 4–5.2)
SODIUM BLD-SCNC: 144 MMOL/L (ref 136–145)
WBC # BLD: 10.6 K/UL (ref 4–11)

## 2021-05-17 PROCEDURE — 2709999900 HC NON-CHARGEABLE SUPPLY

## 2021-05-17 PROCEDURE — 33340 PERQ CLSR TCAT L ATR APNDGE: CPT | Performed by: INTERNAL MEDICINE

## 2021-05-17 PROCEDURE — 2500000003 HC RX 250 WO HCPCS: Performed by: NURSE ANESTHETIST, CERTIFIED REGISTERED

## 2021-05-17 PROCEDURE — APPNB60 APP NON BILLABLE TIME 46-60 MINS: Performed by: NURSE PRACTITIONER

## 2021-05-17 PROCEDURE — 2100000000 HC CCU R&B

## 2021-05-17 PROCEDURE — 6370000000 HC RX 637 (ALT 250 FOR IP)

## 2021-05-17 PROCEDURE — 6360000002 HC RX W HCPCS: Performed by: NURSE ANESTHETIST, CERTIFIED REGISTERED

## 2021-05-17 PROCEDURE — 86901 BLOOD TYPING SEROLOGIC RH(D): CPT

## 2021-05-17 PROCEDURE — 2500000003 HC RX 250 WO HCPCS

## 2021-05-17 PROCEDURE — 85347 COAGULATION TIME ACTIVATED: CPT

## 2021-05-17 PROCEDURE — C1760 CLOSURE DEV, VASC: HCPCS

## 2021-05-17 PROCEDURE — 3700000000 HC ANESTHESIA ATTENDED CARE

## 2021-05-17 PROCEDURE — 2580000003 HC RX 258: Performed by: NURSE ANESTHETIST, CERTIFIED REGISTERED

## 2021-05-17 PROCEDURE — 93355 ECHO TRANSESOPHAGEAL (TEE): CPT

## 2021-05-17 PROCEDURE — 6360000002 HC RX W HCPCS

## 2021-05-17 PROCEDURE — 93308 TTE F-UP OR LMTD: CPT

## 2021-05-17 PROCEDURE — 6360000002 HC RX W HCPCS: Performed by: INTERNAL MEDICINE

## 2021-05-17 PROCEDURE — 6360000002 HC RX W HCPCS: Performed by: NURSE PRACTITIONER

## 2021-05-17 PROCEDURE — 3700000001 HC ADD 15 MINUTES (ANESTHESIA)

## 2021-05-17 PROCEDURE — 2580000003 HC RX 258: Performed by: INTERNAL MEDICINE

## 2021-05-17 PROCEDURE — 6360000004 HC RX CONTRAST MEDICATION: Performed by: INTERNAL MEDICINE

## 2021-05-17 PROCEDURE — 94761 N-INVAS EAR/PLS OXIMETRY MLT: CPT

## 2021-05-17 PROCEDURE — 33340 PERQ CLSR TCAT L ATR APNDGE: CPT

## 2021-05-17 PROCEDURE — 71046 X-RAY EXAM CHEST 2 VIEWS: CPT

## 2021-05-17 PROCEDURE — 86922 COMPATIBILITY TEST ANTIGLOB: CPT

## 2021-05-17 PROCEDURE — 6370000000 HC RX 637 (ALT 250 FOR IP): Performed by: INTERNAL MEDICINE

## 2021-05-17 PROCEDURE — 02L73DK OCCLUSION OF LEFT ATRIAL APPENDAGE WITH INTRALUMINAL DEVICE, PERCUTANEOUS APPROACH: ICD-10-PCS | Performed by: INTERNAL MEDICINE

## 2021-05-17 PROCEDURE — 2580000003 HC RX 258

## 2021-05-17 PROCEDURE — 2780000010 HC IMPLANT OTHER

## 2021-05-17 PROCEDURE — 85027 COMPLETE CBC AUTOMATED: CPT

## 2021-05-17 PROCEDURE — 93005 ELECTROCARDIOGRAM TRACING: CPT | Performed by: INTERNAL MEDICINE

## 2021-05-17 PROCEDURE — 86900 BLOOD TYPING SEROLOGIC ABO: CPT

## 2021-05-17 PROCEDURE — 93010 ELECTROCARDIOGRAM REPORT: CPT | Performed by: INTERNAL MEDICINE

## 2021-05-17 PROCEDURE — 2700000000 HC OXYGEN THERAPY PER DAY

## 2021-05-17 PROCEDURE — 2720000010 HC SURG SUPPLY STERILE

## 2021-05-17 PROCEDURE — C1894 INTRO/SHEATH, NON-LASER: HCPCS

## 2021-05-17 PROCEDURE — C1769 GUIDE WIRE: HCPCS

## 2021-05-17 PROCEDURE — 7100000000 HC PACU RECOVERY - FIRST 15 MIN

## 2021-05-17 PROCEDURE — 7100000001 HC PACU RECOVERY - ADDTL 15 MIN

## 2021-05-17 PROCEDURE — 80048 BASIC METABOLIC PNL TOTAL CA: CPT

## 2021-05-17 PROCEDURE — 86850 RBC ANTIBODY SCREEN: CPT

## 2021-05-17 RX ORDER — GLYCOPYRROLATE 0.2 MG/ML
INJECTION INTRAMUSCULAR; INTRAVENOUS PRN
Status: DISCONTINUED | OUTPATIENT
Start: 2021-05-17 | End: 2021-05-17 | Stop reason: SDUPTHER

## 2021-05-17 RX ORDER — LIDOCAINE HYDROCHLORIDE 20 MG/ML
INJECTION, SOLUTION EPIDURAL; INFILTRATION; INTRACAUDAL; PERINEURAL PRN
Status: DISCONTINUED | OUTPATIENT
Start: 2021-05-17 | End: 2021-05-17 | Stop reason: SDUPTHER

## 2021-05-17 RX ORDER — SODIUM CHLORIDE 9 MG/ML
INJECTION, SOLUTION INTRAVENOUS CONTINUOUS
Status: DISCONTINUED | OUTPATIENT
Start: 2021-05-17 | End: 2021-05-19 | Stop reason: HOSPADM

## 2021-05-17 RX ORDER — NITROGLYCERIN 0.4 MG/1
0.4 TABLET SUBLINGUAL EVERY 5 MIN PRN
Status: DISCONTINUED | OUTPATIENT
Start: 2021-05-17 | End: 2021-05-19 | Stop reason: HOSPADM

## 2021-05-17 RX ORDER — MORPHINE SULFATE 2 MG/ML
2 INJECTION, SOLUTION INTRAMUSCULAR; INTRAVENOUS ONCE
Status: COMPLETED | OUTPATIENT
Start: 2021-05-17 | End: 2021-05-17

## 2021-05-17 RX ORDER — SODIUM CHLORIDE 9 MG/ML
INJECTION INTRAVENOUS PRN
Status: DISCONTINUED | OUTPATIENT
Start: 2021-05-17 | End: 2021-05-17 | Stop reason: SDUPTHER

## 2021-05-17 RX ORDER — TOPIRAMATE 25 MG/1
25 TABLET ORAL DAILY
Status: DISCONTINUED | OUTPATIENT
Start: 2021-05-17 | End: 2021-05-19 | Stop reason: HOSPADM

## 2021-05-17 RX ORDER — PREDNISONE 1 MG/1
10 TABLET ORAL DAILY
Status: DISCONTINUED | OUTPATIENT
Start: 2021-05-17 | End: 2021-05-19 | Stop reason: HOSPADM

## 2021-05-17 RX ORDER — ASPIRIN 81 MG/1
81 TABLET ORAL DAILY
Qty: 30 TABLET | Refills: 5
Start: 2021-05-17 | End: 2022-01-26

## 2021-05-17 RX ORDER — PROTAMINE SULFATE 10 MG/ML
INJECTION, SOLUTION INTRAVENOUS PRN
Status: DISCONTINUED | OUTPATIENT
Start: 2021-05-17 | End: 2021-05-17 | Stop reason: SDUPTHER

## 2021-05-17 RX ORDER — METOPROLOL SUCCINATE 25 MG/1
25 TABLET, EXTENDED RELEASE ORAL 2 TIMES DAILY
Status: DISCONTINUED | OUTPATIENT
Start: 2021-05-17 | End: 2021-05-19 | Stop reason: HOSPADM

## 2021-05-17 RX ORDER — METHYLPREDNISOLONE SODIUM SUCCINATE 125 MG/2ML
125 INJECTION, POWDER, LYOPHILIZED, FOR SOLUTION INTRAMUSCULAR; INTRAVENOUS ONCE
Status: DISCONTINUED | OUTPATIENT
Start: 2021-05-17 | End: 2021-05-19 | Stop reason: HOSPADM

## 2021-05-17 RX ORDER — ACETAMINOPHEN 325 MG/1
650 TABLET ORAL EVERY 4 HOURS PRN
Status: DISCONTINUED | OUTPATIENT
Start: 2021-05-17 | End: 2021-05-19 | Stop reason: HOSPADM

## 2021-05-17 RX ORDER — DULOXETIN HYDROCHLORIDE 60 MG/1
60 CAPSULE, DELAYED RELEASE ORAL DAILY
Status: DISCONTINUED | OUTPATIENT
Start: 2021-05-17 | End: 2021-05-19 | Stop reason: HOSPADM

## 2021-05-17 RX ORDER — ONDANSETRON 2 MG/ML
INJECTION INTRAMUSCULAR; INTRAVENOUS PRN
Status: DISCONTINUED | OUTPATIENT
Start: 2021-05-17 | End: 2021-05-17 | Stop reason: SDUPTHER

## 2021-05-17 RX ORDER — DIPHENHYDRAMINE HYDROCHLORIDE 50 MG/ML
25 INJECTION INTRAMUSCULAR; INTRAVENOUS ONCE
Status: DISCONTINUED | OUTPATIENT
Start: 2021-05-17 | End: 2021-05-19 | Stop reason: HOSPADM

## 2021-05-17 RX ORDER — DEXAMETHASONE SODIUM PHOSPHATE 4 MG/ML
INJECTION, SOLUTION INTRA-ARTICULAR; INTRALESIONAL; INTRAMUSCULAR; INTRAVENOUS; SOFT TISSUE PRN
Status: DISCONTINUED | OUTPATIENT
Start: 2021-05-17 | End: 2021-05-17 | Stop reason: SDUPTHER

## 2021-05-17 RX ORDER — PROPOFOL 10 MG/ML
INJECTION, EMULSION INTRAVENOUS PRN
Status: DISCONTINUED | OUTPATIENT
Start: 2021-05-17 | End: 2021-05-17 | Stop reason: SDUPTHER

## 2021-05-17 RX ORDER — FENTANYL CITRATE 50 UG/ML
INJECTION, SOLUTION INTRAMUSCULAR; INTRAVENOUS PRN
Status: DISCONTINUED | OUTPATIENT
Start: 2021-05-17 | End: 2021-05-17 | Stop reason: SDUPTHER

## 2021-05-17 RX ORDER — TORSEMIDE 20 MG/1
10 TABLET ORAL DAILY
Status: DISCONTINUED | OUTPATIENT
Start: 2021-05-17 | End: 2021-05-19 | Stop reason: HOSPADM

## 2021-05-17 RX ORDER — HEPARIN SODIUM 1000 [USP'U]/ML
INJECTION, SOLUTION INTRAVENOUS; SUBCUTANEOUS PRN
Status: DISCONTINUED | OUTPATIENT
Start: 2021-05-17 | End: 2021-05-17 | Stop reason: SDUPTHER

## 2021-05-17 RX ORDER — VECURONIUM BROMIDE 1 MG/ML
INJECTION, POWDER, LYOPHILIZED, FOR SOLUTION INTRAVENOUS PRN
Status: DISCONTINUED | OUTPATIENT
Start: 2021-05-17 | End: 2021-05-17 | Stop reason: SDUPTHER

## 2021-05-17 RX ORDER — ALLOPURINOL 100 MG/1
100 TABLET ORAL DAILY
Status: DISCONTINUED | OUTPATIENT
Start: 2021-05-17 | End: 2021-05-19 | Stop reason: HOSPADM

## 2021-05-17 RX ORDER — ERGOCALCIFEROL 1.25 MG/1
50000 CAPSULE ORAL WEEKLY
Status: DISCONTINUED | OUTPATIENT
Start: 2021-05-21 | End: 2021-05-19 | Stop reason: HOSPADM

## 2021-05-17 RX ORDER — SODIUM CHLORIDE 9 MG/ML
25 INJECTION, SOLUTION INTRAVENOUS PRN
Status: DISCONTINUED | OUTPATIENT
Start: 2021-05-17 | End: 2021-05-19 | Stop reason: HOSPADM

## 2021-05-17 RX ORDER — LEVOTHYROXINE SODIUM 0.1 MG/1
100 TABLET ORAL
Status: DISCONTINUED | OUTPATIENT
Start: 2021-05-18 | End: 2021-05-19 | Stop reason: HOSPADM

## 2021-05-17 RX ADMIN — SUGAMMADEX 150 MG: 100 INJECTION, SOLUTION INTRAVENOUS at 13:58

## 2021-05-17 RX ADMIN — LIDOCAINE HYDROCHLORIDE 60 MG: 20 INJECTION, SOLUTION EPIDURAL; INFILTRATION; INTRACAUDAL; PERINEURAL at 13:08

## 2021-05-17 RX ADMIN — GLYCOPYRROLATE 0.2 MG: 0.2 INJECTION, SOLUTION INTRAMUSCULAR; INTRAVENOUS at 13:13

## 2021-05-17 RX ADMIN — VECURONIUM BROMIDE 5 MG: 1 INJECTION, POWDER, LYOPHILIZED, FOR SOLUTION INTRAVENOUS at 13:11

## 2021-05-17 RX ADMIN — ONDANSETRON 4 MG: 2 INJECTION INTRAMUSCULAR; INTRAVENOUS at 13:13

## 2021-05-17 RX ADMIN — SODIUM CHLORIDE 5 ML: 9 INJECTION INTRAMUSCULAR; INTRAVENOUS; SUBCUTANEOUS at 13:11

## 2021-05-17 RX ADMIN — FENTANYL CITRATE 50 MCG: 50 INJECTION INTRAMUSCULAR; INTRAVENOUS at 13:04

## 2021-05-17 RX ADMIN — DEXAMETHASONE SODIUM PHOSPHATE 8 MG: 4 INJECTION, SOLUTION INTRAMUSCULAR; INTRAVENOUS at 13:13

## 2021-05-17 RX ADMIN — FENTANYL CITRATE 50 MCG: 50 INJECTION INTRAMUSCULAR; INTRAVENOUS at 13:05

## 2021-05-17 RX ADMIN — PROPOFOL 100 MG: 10 INJECTION, EMULSION INTRAVENOUS at 13:08

## 2021-05-17 RX ADMIN — HEPARIN SODIUM 3000 UNITS: 1000 INJECTION INTRAVENOUS; SUBCUTANEOUS at 13:40

## 2021-05-17 RX ADMIN — HEPARIN SODIUM 3000 UNITS: 1000 INJECTION INTRAVENOUS; SUBCUTANEOUS at 13:28

## 2021-05-17 RX ADMIN — MORPHINE SULFATE 2 MG: 2 INJECTION, SOLUTION INTRAMUSCULAR; INTRAVENOUS at 22:12

## 2021-05-17 RX ADMIN — SODIUM CHLORIDE: 9 INJECTION, SOLUTION INTRAVENOUS at 13:04

## 2021-05-17 RX ADMIN — PREDNISONE 10 MG: 5 TABLET ORAL at 18:55

## 2021-05-17 RX ADMIN — ALLOPURINOL 100 MG: 100 TABLET ORAL at 18:55

## 2021-05-17 RX ADMIN — TORSEMIDE 10 MG: 20 TABLET ORAL at 18:56

## 2021-05-17 RX ADMIN — METOPROLOL SUCCINATE 25 MG: 25 TABLET, EXTENDED RELEASE ORAL at 20:48

## 2021-05-17 RX ADMIN — IOPAMIDOL 80 ML: 755 INJECTION, SOLUTION INTRAVENOUS at 13:57

## 2021-05-17 RX ADMIN — PROTAMINE SULFATE 30 MG: 10 INJECTION, SOLUTION INTRAVENOUS at 13:58

## 2021-05-17 RX ADMIN — HEPARIN SODIUM 2000 UNITS: 1000 INJECTION INTRAVENOUS; SUBCUTANEOUS at 13:54

## 2021-05-17 RX ADMIN — ACETAMINOPHEN 650 MG: 325 TABLET ORAL at 18:55

## 2021-05-17 ASSESSMENT — PAIN SCALES - GENERAL
PAINLEVEL_OUTOF10: 0
PAINLEVEL_OUTOF10: 10
PAINLEVEL_OUTOF10: 0
PAINLEVEL_OUTOF10: 10

## 2021-05-17 ASSESSMENT — PULMONARY FUNCTION TESTS
PIF_VALUE: 17
PIF_VALUE: 16
PIF_VALUE: 17
PIF_VALUE: 16
PIF_VALUE: 17
PIF_VALUE: 18
PIF_VALUE: 0
PIF_VALUE: 17
PIF_VALUE: 2
PIF_VALUE: 12
PIF_VALUE: 15
PIF_VALUE: 1
PIF_VALUE: 16
PIF_VALUE: 16
PIF_VALUE: 5
PIF_VALUE: 18
PIF_VALUE: 17
PIF_VALUE: 17
PIF_VALUE: 16
PIF_VALUE: 1
PIF_VALUE: 17
PIF_VALUE: 19
PIF_VALUE: 17
PIF_VALUE: 17
PIF_VALUE: 18
PIF_VALUE: 17
PIF_VALUE: 17
PIF_VALUE: 16
PIF_VALUE: 35
PIF_VALUE: 17

## 2021-05-17 ASSESSMENT — PAIN DESCRIPTION - FREQUENCY: FREQUENCY: CONTINUOUS

## 2021-05-17 ASSESSMENT — PAIN DESCRIPTION - PROGRESSION: CLINICAL_PROGRESSION: GRADUALLY WORSENING

## 2021-05-17 ASSESSMENT — ENCOUNTER SYMPTOMS: SHORTNESS OF BREATH: 0

## 2021-05-17 ASSESSMENT — PAIN DESCRIPTION - LOCATION: LOCATION: BACK;CHEST

## 2021-05-17 ASSESSMENT — PAIN DESCRIPTION - PAIN TYPE: TYPE: ACUTE PAIN

## 2021-05-17 ASSESSMENT — PAIN DESCRIPTION - ONSET: ONSET: ON-GOING

## 2021-05-17 NOTE — PROGRESS NOTES
Carrie Rubalcava  1940  [id number]    HPI: [de-identified] y/o female with PMH significant for PAF/ prior DCCV in 2008/ RFA in 4/2009, S/PPM in 11/2013 for SSS/AV block/generator 1/25/2021 with new RA lead implant, CAD/CABG/multiple interventions, atrial flutter with DCCV in 8/2017, CHF, severe restrictive pulmonary disease and followed by pulmonary and prior smoker who was admitted for placement of Watchman LAAC device d/t recurrent falls with resultant admission to [facility] in 12/2020 for concerns of L kidney contusion. Given her H/O falls with injury, Watchman LAAC device was recommended. On 5/17/2021 she underwent placement of size 27 Watchman LAAC device. Patient seen and examined. I have reviewed the notes, assessments, and/or procedures (including HPI, PMH, SH, FH, ROS and PE). Post procedure labs reviewed    Post procedure she developed pleuritic pain which worsened (rated 7 out of 10). Discussed with Dr. Jose Sarah and will admit overnight for observation. Post procedure echo preliminary report shoed trivial effusion. Physical Exam:   BP (!) 121/50   Pulse 70   Temp 97.2 °F (36.2 °C) (Temporal)   Resp 15   Ht 5' 3\" (1.6 m)   Wt 128 lb (58.1 kg)   SpO2 100%   BMI 22.67 kg/m²   Wt Readings from Last 2 Encounters:   05/17/21 128 lb (58.1 kg)   05/14/21 125 lb (56.7 kg)     Constitutional: She is oriented to person, place, and time. She appears well-developed and well-nourished. In no acute distress. + (7 out of 10 pain scale) pleuritic pain with inspiration  HEENT: Normocephalic and atraumatic. Sclerae anicteric. No xanthelasmas. EOM's intact. Neck: Neck supple. No JVD present. Cardiovascular: RRR, normal S1 and S2; I/VI systolic murmur  Pulmonary/Chest: Effort normal.  Lungs clear to auscultation. Chest wall nontender  Abdominal: soft, nontender, nondistended. + bowel sounds. No hepatomegaly  Extremities: No edema, cyanosis, or clubbing. Pulses are 2+ radial/femoral/DP/PT bilaterally. Cap refill brisk.  R groin site unremarkable  Neurological: No focal deficit. Skin: Skin is warm and dry. Psychiatric: She has a normal mood and affect. Her speech is normal and behavior is normal.        Limited TTE DATE: 05/17/21  Pending  Preliminary report mentions trivial effusion    5/17/2021 Watchman LAAC device:  · Percutaneous transcatheter closure of the left atrial appendage with endocardial implant, Watchman Flx device  · Size 27 Watchman Flx device  · DONA did not show any pericardial effusion. ECG:  Electronic pacemaker    4/14/2021 DONA:   Overall left ventricular systolic function appears mildly reduced. Ejection   fraction is visually estimated to be 45-50%. Decreased mobility of posterior mitral valve leaflet. Moderate mitral   regurgitation with calculated ERO of 0.26 cm2. Moderate tricuspid regurgitation. Echo 4/30/2020:   Limited echo      Left ventricular cavity size is normal. Normal left ventricular wall   thickness. Left ventricular function appears to be reduced with an estimated   ejection fraction of 45%. Diffuse hypokinesis. Impression       1. Paroxysmal atrial fibrillation  -S/P Watchman LAAC device placement on 5/17/2021  -not a good long term anticoagulation candidate d/t falls  -[id number]  VASc score: 6  - Tretament with ASA and Xarelto therapy  - DONA at 45 days   -prior DCCV in 8/2017 and RFA for atrial fib at [address] [place] [address] in 2009  -discussed ASA use with pt: she gets nausea at times; she will take low dose with food and continue with Xarelto    2. Pleuritic pain  -will check CXR  -no evidence significant effusion  -Tylenol as needed  -will check ECG      Recommendations   Discussed with Dr. Alyson Zamudio: admit to CVICU  Check CXR   Check repeat CBC in the AM  Hold Xarelto until the AM  Tylenol as needed.   Continue allopurinol, Toprol, torsemide    Electronically signed by TIFFANIE Collier CNP on 5/17/2021 at 3:22 PM

## 2021-05-17 NOTE — PROGRESS NOTES
Pt drowsy but easy to arouse. States no pain. Able to wiggle toes, cap refill less than 3, pedal pulses palpable. VSS. Michael Ryderl. Dressing to right groin clean dry and intact. Suture intact. Continue to monitor.

## 2021-05-17 NOTE — PROCEDURES
general anesthesia by anesthesiology team.       Transesophageal echocardiography was performed and measurements of left atrial appendage, including ostium size and depth were obtained for selection of appropriate watchman device. Both groins were prepped in a sterile fashion. Femoral venous access was obtained under live ultrasound guidance. The femoral vein was identified with real time visualization of needle passage to the venous lumen. A 14 F sheath was placed inside the right femoral vein. Transseptal punctures through intact septum were done using DONA guidance as well as pressure monitoring , fluoroscopy in EDY and HOLLAND projections. Patient received a bolus of heparin prior and after transseptal puncture. SL-1 Sheaths inside the left atrium. VIELKA pressure measured. A long wire was placed into the left sided pulmonary vein. Then the SL sheath was exchanged over the wire with double curve watchman access sheath. Then a pigtail catheter was inserted into the watchman access sheath. Pigtail catheter was carefully advanced into the anterior lobe of the appendage under fluoroscopy and DONA guidance. Angiography of VIELKA was performed in different fluoroscopic views. Pigtail catheter was removed. A size 27 Watchman Flx device was used. Then Watchman delivery sheath was inserted into the access sheath. Using fluoroscopy and live DONA delivery sheath was advanced the anterior lobe of the appendage. Then device was implanted into the appendage under fluoroscopy and live DONA imaging. After deployment a tug test was done and stability of device was checked. Position of device was checked. Measurement of device showed appropriate compression of the device. Using color Doppler, no leak around the was noted. PASS criteria for releasing of device were met and device was released successfully. Watchman access sheath was removed from left atrium. DONA did not show any pericardial effusion.      A figure of 8 was used to achieve hemostasis at the insertion of right femoral vein. EBL less than 20 ml. Patient was extubated and transferred to the floor. No immediate complications noted. Conclusion:      Patient will be observed overnight. Patient needs 45 days of anticoagulation plus ASA followed by DONA. Plan for discharging home tomorrow.

## 2021-05-17 NOTE — ANESTHESIA POSTPROCEDURE EVALUATION
[facility] Department of Anesthesiology  Post-Anesthesia Note       Name:  Ever Cage                                  Age:  [de-identified] y.o. MRN:  [id number]     Last Vitals & Oxygen Saturation: BP (!) 121/50   Pulse 70   Temp 97.2 °F (36.2 °C) (Temporal)   Resp 15   Ht 5' 3\" (1.6 m)   Wt 128 lb (58.1 kg)   SpO2 100%   BMI 22.67 kg/m²   Patient Vitals for the past 4 hrs:   BP Temp Temp src Pulse Resp SpO2   05/17/21 1441 (!) 121/50 -- -- 70 15 100 %   05/17/21 1437 (!) 114/52 -- -- 70 14 100 %   05/17/21 1432 (!) 119/47 -- -- 70 14 100 %   05/17/21 1430 (!) 123/52 -- -- 70 19 100 %   05/17/21 1426 -- 97.2 °F (36.2 °C) Temporal 70 13 100 %   05/17/21 1417 (!) 120/51 -- -- 70 13 100 %   05/17/21 1412 (!) 121/46 -- -- 70 16 100 %   05/17/21 1409 -- 97 °F (36.1 °C) Temporal 70 12 100 %       Level of consciousness:  Awake, alert    Respiratory: Respirations easy, no distress. Stable. Cardiovascular: Hemodynamically stable. Hydration: Adequate. PONV: Adequately managed. Post-op pain: Adequately controlled. Post-op assessment: Tolerated anesthetic well without complication. Complications:  None.     Deena Yarbrough MD  May 17, 2021   4:38 PM

## 2021-05-17 NOTE — H&P
[address]   Electrophysiology Follow up   Date: 5/17/2021  I had the privilege of visiting Terese Horowitz in the office. CC: Frequent falls    HPI: Terese Horowitz is a [de-identified] y.o. female with a history of paroxysmal atrial fib and underwent cardioversion in 2008. She had recurrent Atrial fib and underwent RFCA on 4/24/09 by Dr. Mustapha Jesus. .    Underwent dual chamber pacemaker [facility]) on 11/8/13 for sick sinus syndrome , and AV block reported at [facility] 1490.      She is s/p  generator change out on 01/25/2021. Noted atrial lead issues, tried to repair the lead which was not effective and had a new RA lead inserted. She has significant Coronary disease with history of CABG and multiple interventions. On 02/02/2017 she had a LHC which showed chronic occlusion of the LAD and has seen interventional cardiology for potential interventions. She was seen on 08/02/2017 and reported having paloitations and chest pain for one months. ECG was done which showed atrial flutter irate in the 140's. She is anticoagulated with Xarelto. She underwent Cardioversion 08/02/2017. She had a PFT that showed severe restrictive disease and she has not followed up with Pulmonology. She was encouraged to see Dr. Love Zarate. She was a previous smoker with suspected COPD and amiodarone is not an option for rate control. She is followed by heart failure. She has had recurrent falls. 12/16/2020  Admission at [facility] for concerns of left kidney contusion s/p fall. She is here for implantation of watchman device. Assessment and plan:      Paroxysmal atrial fibrillation   Patient has high [id number] score 6 and requires anticoagulation to prevent thromboembolic events. [id number] Score: 6    Unfortunately patient has had recurrent falls with injuries. She is looking for alternatives to anticoagulation. I discussed left atrial colsure with watchman device.  Alternatives including surgical ligation of VIELKA also discussed. I explained to the patient that most ischemic stroke in patient with atrial fibrillation are due to a thrombus/clot in the left atrial appendage. However some patients do have a stroke with different reasons including hemorrhage. Watchman device only protects against the stroke associated with left atrial appendage related clots/thrombus. Risks, benefits and alternative of procedure were explained. All questions answered. Patient understood and would like to proceed. The procedure has been discussed in detail with patient and family members along with the risk and benefits. Success rate and complications associated with such a procedure have been discussed. It was also emphasized that the watchman procedure can only be pursued if there is no evidence of thrombus in the left atrial appendage. It was also explained the need for short term coumadin therapy followed by antiplatelet therapy. All the questions were answered. Patient wishes to proceed.       Recurrent atrial flutter/tachycardia/fibrillation   EKG today Sinus with first degree block   Antiarrythmic therapy is limited since she has extensive structural heart disease. Amiodarone is not indicated because she has severe abnormal PFT. S/p DCCV 08/02/2017   S/p RFCA for Afib at [facility] by Dr. Nicolasa Navarrete 2009   Toprol XL 25 mg BID   Xarelto 10 mg Creatinine Clearance 27 ml/min   High [id number] score and high risk for stroke and thromboembolism      Atrial fib burden on device today is 5%. Longest 14 hours  . Shortness of Breath/ Severe COPD    Has not seen Dr. Aida Dailey   PFT 2018 shows severe restrictive lung disease   ECHO 04/30/2020 LVEF 45 %      Bradycardia/Sinus node dysfunction   S/p dual chamber pacemaker, generator change with new RA lead 01/25/2021    The CIED was interrogated and programmed and I supervised and reviewed all the data.  All findings and changes are in device interrogation EVERY DAY 4/26/21  Yes Christiano Hudson MD   lidocaine (LIDODERM) 5 % Place 1 patch onto the skin daily 12 hours on, 12 hours off. 4/16/21  Yes Spike Samuels PA-C   topiramate (TOPAMAX) 50 MG tablet TAKE 2 TABLETS TWICE DAILY 4/7/21  Yes Christiano Hudson MD   torsemide (DEMADEX) 10 MG tablet TAKE 1 TABLET EVERY OTHER DAY ALTERNATING WITH  2  TABLETS EVERY OTHER DAY 4/5/21  Yes Christiano Hudson MD   allopurinol (ZYLOPRIM) 100 MG tablet Take 1 tablet by mouth daily 3/15/21  Yes Christiano Hudson MD   predniSONE (DELTASONE) 10 MG tablet Take 1 tablet by mouth daily 3/9/21  Yes Christiano Hudson MD   nitroGLYCERIN (NITROLINGUAL) 0.4 MG/SPRAY 0.4 mg spray Place 1 spray under the tongue every 5 minutes as needed for Chest pain 2/4/21  Yes Haylee Ross MD   metoprolol succinate (TOPROL XL) 25 MG extended release tablet Take 1 tablet by mouth 2 times daily 1/11/21  Yes Christiano Hudson MD   DULoxetine (CYMBALTA) 60 MG extended release capsule Take 1 capsule by mouth daily 12/8/20  Yes Christiano Hudson MD   vitamin D (ERGOCALCIFEROL) 1.25 MG (19841 UT) CAPS capsule Take 1 capsule by mouth once a week 11/9/20   Christiano Hudson MD       Allergies   Allergen Reactions    Aspirin Nausea Only    Ativan [Lorazepam] Other (See Comments)     hallucinations    Diltiazem Anaphylaxis    Sulfa Antibiotics Rash and Hives    Dabigatran Nausea Only     And indigestion  And indigestion    Aka Pradaxa    Lipitor [Atorvastatin] Other (See Comments)     Muscle pains    Mysoline [Primidone]     Nsaids     Other Other (See Comments)     Nitroglycerin patches causes severe headaches       Social History:  Reviewed. reports that she quit smoking about 34 years ago. Her smoking use included cigarettes. She has a 28.00 pack-year smoking history. She has never used smokeless tobacco. She reports previous alcohol use. She reports that she does not use drugs. Family History:  Reviewed. Reviewed.  No family history of SCD.      Relevant and available labs, and cardiovascular diagnostics reviewed. Reviewed. I independently reviewed relevant and available cardiac diagnostic tests ECG, CXR, Echo, Stress test, Device interrogation, Holter, CT scan. - The patient is counseled to follow a low salt diet to assure blood pressure remains controlled for cardiovascular risk factor modification.   - The patient is counseled to avoid excess caffeine, and energy drinks as this may exacerbated ectopy and arrhythmia. - The patient is counseled to get regular exercise 3-5 times per week to control cardiovascular risk factors. - The patient is counseled to lose weigt to control cardiovascular risk factors. - The patient is counseled to avoid tobacco use. All questions and concerns were addressed to the patient/family. Alternatives to my treatment were discussed. I have discussed the above stated plan and the patient verbalized understanding and agreed with the plan. NOTE: This report was transcribed using voice recognition software. Every effort was made to ensure accuracy, however, inadvertent computerized transcription errors may be present. Layla Cooper MD, MPH  [facility]   Office: [phone]  Fax: [phone]      H&P Update    I have reviewed the history and physical and examined the patient and updated with relevant changes. Consent: I have discussed with the patient and/or the patient representative the indication, alternatives, and the possible risks and/or complications of the planned procedure and the anesthesia methods. The patient and/or patient representative appear to understand and agree to proceed. Vitals:    05/17/21 1022   BP: (!) 153/65   Pulse: 70   Resp: 16   Temp: 97.2 °F (36.2 °C)   SpO2: 99%     Prior to Admission medications    Medication Sig Start Date End Date Taking?  Authorizing Provider   rivaroxaban (XARELTO) 15 MG TABS tablet Take 1 tablet by mouth daily (with breakfast) 5/14/21  Yes Marannalise People, APRN - CNP   chlorhexidine (HIBICLENS) 4 % external liquid Wash from neck down the night before or morning of the procedure 5/12/21 5/31/21 Yes Jose J Gutierrez MD   levothyroxine (SYNTHROID) 112 MCG tablet TAKE 1 TABLET EVERY DAY 4/26/21  Yes Brock Darling MD   lidocaine (LIDODERM) 5 % Place 1 patch onto the skin daily 12 hours on, 12 hours off. 4/16/21  Yes Angeline Rausch PA-C   topiramate (TOPAMAX) 50 MG tablet TAKE 2 TABLETS TWICE DAILY 4/7/21  Yes Brock Darling MD   torsemide (DEMADEX) 10 MG tablet TAKE 1 TABLET EVERY OTHER DAY ALTERNATING WITH  2  TABLETS EVERY OTHER DAY 4/5/21  Yes Brock Darling MD   allopurinol (ZYLOPRIM) 100 MG tablet Take 1 tablet by mouth daily 3/15/21  Yes Brock Darling MD   predniSONE (DELTASONE) 10 MG tablet Take 1 tablet by mouth daily 3/9/21  Yes Brock Darling MD   nitroGLYCERIN (NITROLINGUAL) 0.4 MG/SPRAY 0.4 mg spray Place 1 spray under the tongue every 5 minutes as needed for Chest pain 2/4/21  Yes Radha Reyez MD   metoprolol succinate (TOPROL XL) 25 MG extended release tablet Take 1 tablet by mouth 2 times daily 1/11/21  Yes Brock Darling MD   DULoxetine (CYMBALTA) 60 MG extended release capsule Take 1 capsule by mouth daily 12/8/20  Yes Brock Darling MD   vitamin D (ERGOCALCIFEROL) 1.25 MG (65934 UT) CAPS capsule Take 1 capsule by mouth once a week 11/9/20   Brock Darling MD     Past Medical History:   Diagnosis Date    Allergic rhinitis, cause unspecified     Arthritis     Atrial fibrillation ([address]     Bronchopneumonia     CAD (coronary artery disease)     stent:  post cataract surgery (CABG)    Cerebral artery occlusion with cerebral infarction ([address]     TIA    Chronic gouty arthropathy     Chronic kidney disease     Congestive heart failure, unspecified     Degeneration of cervical intervertebral disc     Essential and other specified forms of tremor     Gout     HIGH CHOLESTEROL  History of CVA (cerebrovascular accident)     Hx of blood clots     Hypertension     Influenza 12/23/2017    Mitral valve stenosis and aortic valve insufficiency     Movement disorder     back problems    Pacemaker     Peptic ulcer, unspecified site, unspecified as acute or chronic, without mention of hemorrhage, perforation, or obstruction     Thyroid disease     Unspecified disorder of kidney and ureter     Unspecified hypothyroidism      Past Surgical History:   Procedure Laterality Date    BACK SURGERY      CARDIAC CATHETERIZATION  7/2012    CARDIAC CATHETERIZATION  08/07/2018    Unsuccesful  of RCA    CARDIAC SURGERY      CABG & Cardiac ablation    CHOLECYSTECTOMY      CORONARY ARTERY BYPASS GRAFT  1987    LIMA- Diag/LAD, SVG- RCA    FEMORAL BYPASS Left 8/22/2019    LEFT FEMORAL TO POPLITEAL BYPASS GRAFT performed by Anuel Liu MD at [facility] FEMORAL-FEMORAL BYPASS GRAFT N/A 8/22/2019    FEMORAL TO FEMORAL BYPASS performed by Anuel Liu MD at [facility] [address] [place] [address] Left 03/16/2017    Left hip pinning    IR KYPHOPLASTY LUMBAR FIRST LEVEL  5/14/2021    IR KYPHOPLASTY LUMBAR FIRST LEVEL 5/14/2021 [facility] PROCEDURES    JOINT REPLACEMENT      [place] NJX AA&/STRD TFRML EPI LUMBAR/SACRAL 1 LEVEL Right 12/3/2018    RIGHT L3 AND L4 LUMBAR TRANSFORAMINAL EPIRUAL STEROID INJECTION WITH FLUOROSCOPY performed by Ada Buck MD at [facility] PTCA  11/04/2014    MARLENY - 3.0 x 28 to the Ist Diag    SHOULDER SURGERY      left     Allergies   Allergen Reactions    Aspirin Nausea Only    Ativan [Lorazepam] Other (See Comments)     hallucinations    Diltiazem Anaphylaxis    Sulfa Antibiotics Rash and Hives    Dabigatran Nausea Only     And indigestion  And indigestion    Aka Pradaxa    Lipitor [Atorvastatin] Other (See Comments)     Muscle pains    Mysoline [Primidone]     Nsaids     Other Other (See Comments)     Nitroglycerin patches causes severe headaches Documentation and Exam:   I have personally completed a history, physical exam & review of systems for this patient (see notes).     Sedation will be provided by anesthesia team.     Electronically signed by Alina Alfaro MD on 5/17/2021 at 10:44 AM

## 2021-05-17 NOTE — PROGRESS NOTES
Pt to pacu with cath lab. ROC Mendoza. Puncture to right groin figure of 8 sutured with gauze and tegaderm clean dry and intact. Pt able to wiggle toes, cap refill less than 3, pedal pulses palpable. Continue to monitor.

## 2021-05-17 NOTE — ANESTHESIA PRE PROCEDURE
[facility] Department of Anesthesiology  Pre-Anesthesia Evaluation/Consultation       Name:  Jodee Lindsey  : 1940  Age:  [de-identified] y.o.                                            MRN:  [id number]  Date: 2021           Surgeon: * No surgeons listed *    Procedure: * No procedures listed *     Allergies   Allergen Reactions    Aspirin Nausea Only    Ativan [Lorazepam] Other (See Comments)     hallucinations    Diltiazem Anaphylaxis    Sulfa Antibiotics Rash and Hives    Dabigatran Nausea Only     And indigestion  And indigestion    Aka Pradaxa    Lipitor [Atorvastatin] Other (See Comments)     Muscle pains    Mysoline [Primidone]     Nsaids     Other Other (See Comments)     Nitroglycerin patches causes severe headaches     Patient Active Problem List   Diagnosis    Chronic atrial fibrillation    Mixed hyperlipidemia    Chronic gouty arthropathy    Acquired hypothyroidism    Arthritis    Non-rheumatic mitral regurgitation    COPD [facility]ch[facility] obstructive pulmonary disease) ([facility]    Restrictive lung disease    Sick sinus syndrome ([address]    Allergic rhinitis    Unstable angina ([facility])    Fibrocystic breast    [facility]    HTN (hypertension), benign    Pacemaker    Primary osteoarthritis involving multiple joints    Vitamin D deficiency    Tremor    Chronic total occlusion of native coronary artery    Age related osteoporosis    Acute on chronic systolic (congestive) heart failure ([facility])    Chronic renal failure, stage 3 (moderate)    PAD (peripheral artery disease) ([facility])    Atherosclerosis of native arteries of extremities with intermittent claudication, bilateral legs ([facility])    Idiopathic peripheral neuropathy    Ischemic cardiomyopathy    Dizziness    Peripheral vertigo, bilateral    PAF (paroxysmal atrial fibrillation) ([facility])    Dyslipidemia    Heart failure ([facility])    Iron deficiency anemia    Anemia    Bilateral sensorineural hearing loss    Memory loss due to medical condition    Symptomatic bradycardia    Pacemaker lead failure    Recurrent falls while walking     Past Medical History:   Diagnosis Date    Allergic rhinitis, cause unspecified     Arthritis     Atrial fibrillation ([address]     Bronchopneumonia     CAD (coronary artery disease)     stent:  post cataract surgery (CABG)    Cerebral artery occlusion with cerebral infarction ([address]     TIA    Chronic gouty arthropathy     Chronic kidney disease     Congestive heart failure, unspecified     Degeneration of cervical intervertebral disc     Essential and other specified forms of tremor     Gout     HIGH CHOLESTEROL     History of CVA (cerebrovascular accident)     Hx of blood clots     Hypertension     Influenza 12/23/2017    Mitral valve stenosis and aortic valve insufficiency     Movement disorder     back problems    Pacemaker     Peptic ulcer, unspecified site, unspecified as acute or chronic, without mention of hemorrhage, perforation, or obstruction     Thyroid disease     Unspecified disorder of kidney and ureter     Unspecified hypothyroidism      Past Surgical History:   Procedure Laterality Date    BACK SURGERY      CARDIAC CATHETERIZATION  7/2012    CARDIAC CATHETERIZATION  08/07/2018    Unsuccesful  of RCA    CARDIAC SURGERY      CABG & Cardiac ablation    CHOLECYSTECTOMY      CORONARY ARTERY BYPASS GRAFT  1987    LIMA- Diag/LAD, SVG- RCA    FEMORAL BYPASS Left 8/22/2019    LEFT FEMORAL TO POPLITEAL BYPASS GRAFT performed by Yoon Sandra MD at [facility] FEMORAL-FEMORAL BYPASS GRAFT N/A 8/22/2019    FEMORAL TO FEMORAL BYPASS performed by Yoon Sandra MD at [address] [facility] Left 03/16/2017    Left hip pinning    IR KYPHOPLASTY LUMBAR FIRST LEVEL  5/14/2021    IR KYPHOPLASTY LUMBAR FIRST LEVEL 5/14/2021 [facility]ED[facility]    JOINT REPLACEMENT      [place] NJX AA&/STRD TFRML EPI LUMBAR/SACRAL 1 LEVEL Right 12/3/2018    RIGHT L3 (42257 UT) CAPS capsule Take 1 capsule by mouth once a week 12 capsule 3     No current facility-administered medications on file prior to encounter. Current Outpatient Medications   Medication Sig Dispense Refill    rivaroxaban (XARELTO) 15 MG TABS tablet Take 1 tablet by mouth daily (with breakfast) 30 tablet 3    chlorhexidine (HIBICLENS) 4 % external liquid Wash from neck down the night before or morning of the procedure 1 Bottle 0    levothyroxine (SYNTHROID) 112 MCG tablet TAKE 1 TABLET EVERY DAY 90 tablet 1    lidocaine (LIDODERM) 5 % Place 1 patch onto the skin daily 12 hours on, 12 hours off.  30 patch 0    topiramate (TOPAMAX) 50 MG tablet TAKE 2 TABLETS TWICE DAILY 360 tablet 0    torsemide (DEMADEX) 10 MG tablet TAKE 1 TABLET EVERY OTHER DAY ALTERNATING WITH  2  TABLETS EVERY OTHER  tablet 1    allopurinol (ZYLOPRIM) 100 MG tablet Take 1 tablet by mouth daily 90 tablet 0    predniSONE (DELTASONE) 10 MG tablet Take 1 tablet by mouth daily 90 tablet 0    nitroGLYCERIN (NITROLINGUAL) 0.4 MG/SPRAY 0.4 mg spray Place 1 spray under the tongue every 5 minutes as needed for Chest pain 1 Bottle 3    metoprolol succinate (TOPROL XL) 25 MG extended release tablet Take 1 tablet by mouth 2 times daily 60 tablet 2    DULoxetine (CYMBALTA) 60 MG extended release capsule Take 1 capsule by mouth daily 90 capsule 1    vitamin D (ERGOCALCIFEROL) 1.25 MG (28545 UT) CAPS capsule Take 1 capsule by mouth once a week 12 capsule 3     Current Facility-Administered Medications   Medication Dose Route Frequency Provider Last Rate Last Admin    0.9 % sodium chloride infusion  25 mL Intravenous PRN Darnell Lynn MD        ceFAZolin (ANCEF) 2000 mg in dextrose 5 % 100 mL IVPB  2,000 mg Intravenous On Call to One Diamond Witt MD        0.9 % sodium chloride infusion   Intravenous Continuous Darnell Lynn MD        diphenhydrAMINE (BENADRYL) injection 25 mg  25 mg Intravenous Once MD Jo Ann Westbrook methylPREDNISolone sodium (SOLU-MEDROL) injection 125 mg  125 mg Intravenous Once Jose J Gutierrez MD         Vital Signs (Current)   Vitals:    21 1022   BP: (!) 153/65   Pulse: 70   Resp: 16   Temp: 97.2 °F (36.2 °C)   TempSrc: Infrared   SpO2: 99%   Weight: 128 lb (58.1 kg)   Height: 5' 3\" (1.6 m)                                          BP Readings from Last 3 Encounters:   21 (!) 153/65   21 (!) 124/54   21 (!) 157/80     Vital Signs Statistics (for past 48 hrs)     Temp  Av.2 °F (36.2 °C)  Min: 97.2 °F (36.2 °C)   Min taken time: 21 1022  Max: 97.2 °F (36.2 °C)   Max taken time: 21 1022  Pulse  Av  Min: 79   Min taken time: 21 1022  Max: 79   Max taken time: 21 1022  Resp  Av  Min: 16   Min taken time: 21 1022  Max: 16   Max taken time: 21 1022  BP  Min: 153/65   Min taken time: 21 1022  Max: 153/65   Max taken time: 21 1022  SpO2  Av %  Min: 99 %   Min taken time: 21 1022  Max: 99 %   Max taken time: 21 1022  BP Readings from Last 3 Encounters:   21 (!) 153/65   21 (!) 124/54   21 (!) 157/80       BMI  Body mass index is 22.67 kg/m². Estimated body mass index is 22.67 kg/m² as calculated from the following:    Height as of this encounter: 5' 3\" (1.6 m). Weight as of this encounter: 128 lb (58.1 kg).     CBC   Lab Results   Component Value Date    WBC 12.4 2021    RBC 4.92 2021    HGB 13.2 2021    HCT 42.0 2021    MCV 85.2 2021    RDW 22.7 2021     2021     CMP    Lab Results   Component Value Date     2021    K 4.4 2021    K 4.9 2020     2021    CO2 19 2021    BUN 44 2021    CREATININE 1.9 2021    GFRAA 31 2021    GFRAA 38 2013    AGRATIO 1.7 2020    LABGLOM 25 2021    GLUCOSE 63 2021    GLUCOSE 82 2020    PROT 6.9 2020    PROT 7.1 10/19/2012 CALCIUM 8.8 05/12/2021    BILITOT 0.5 11/07/2020    BILITOT 0.4 07/17/2019    ALKPHOS 80 11/07/2020    AST 18 11/07/2020    ALT 11 11/07/2020     BMP    Lab Results   Component Value Date     05/12/2021    K 4.4 05/12/2021    K 4.9 12/16/2020     05/12/2021    CO2 19 05/12/2021    BUN 44 05/12/2021    CREATININE 1.9 05/12/2021    CALCIUM 8.8 05/12/2021    GFRAA 31 05/12/2021    GFRAA 38 04/02/2013    LABGLOM 25 05/12/2021    GLUCOSE 63 05/12/2021    GLUCOSE 82 07/01/2020     POCGlucose  No results for input(s): GLUCOSE in the last 72 hours. Coags    Lab Results   Component Value Date    PROTIME 11.7 05/14/2021    INR 1.01 05/14/2021    APTT 30.8 20/30/8490     HCG (If Applicable) No results found for: PREGTESTUR, PREGSERUM, HCG, HCGQUANT   ABGs No results found for: PHART, PO2ART, MRB9KKT, UWC5OPE, BEART, K8ZBMNDW   Type & Screen (If Applicable)  No results found for: LABABO, LABRH                         BMI: Wt Readings from Last 3 Encounters:       NPO Status:                          Anesthesia Evaluation  Patient summary reviewed  Airway: Mallampati: II  TM distance: >3 FB   Neck ROM: full  Mouth opening: > = 3 FB Dental:    (+) edentulous      Pulmonary:   (+) pneumonia:  COPD:      (-) asthma and shortness of breath                           Cardiovascular:    (+) hypertension:, angina (on NTG): with exertion, pacemaker:, CAD: no interval change, CHF:,     (-) valvular problems/murmurs, past MI, CABG/stent and dysrhythmias    ECG reviewed                        Neuro/Psych:   (+) CVA:, neuromuscular disease:,    (-) seizures and TIA           GI/Hepatic/Renal:   (+) PUD,      (-) GERD, liver disease and no renal disease       Endo/Other:    (+) hypothyroidism::., .    (-) diabetes mellitus               Abdominal:           Vascular: negative vascular ROS.                                        Anesthesia Plan      general     ASA 3     (I discussed with the patient the risks and benefits of PIV,

## 2021-05-17 NOTE — TELEPHONE ENCOUNTER
5/17/2021 s/p SUCCESSFUL WATCHMAN LAAC PROCEDURE PER DR. Cifuentes  successful deployment of left atrial appendage occlusion device with no complication, WATCHMAN device used 27 mm size. --2-week f/u Andressa Smith CNP on 5/25/2021 at 10:30 AM  --6 month f/u with Elana Rose CNP on 11/17/21 at 11:00 AM  --1-year f/u with unable to schedule d/t calendar doesnt go into 1/2022   All will print on discharge after visit summary. Bhumi Avila,   Post procedure DONA to be completed 45-59 days post procedure (7/1/2021-7/15/2021). Order placed. Thanks.    Pirce Swann [facility] Coordinator

## 2021-05-17 NOTE — PROGRESS NOTES
Dr. Gail Riley notified that on way to cath lab pre/post with pt the pt stated that she was having slight pain with breathing, O2 remained 99% on 2 L of O2 nasal canula, clear breath sounds. Pt states this feeling could be due to laying flat. Cath lab nurse also aware. No further orders per Dr. Gail Riley.

## 2021-05-17 NOTE — PROGRESS NOTES
Patient arrived to CVU from cath lab. Alert and oriented. Daughter present. R groin site assessed. Clean/dry/intact with no hematoma noted and neurovasc checks intact. Reports dyspnea with transfer to bed and rolling side to side. HOB raised, SpO2 sats stable in the 90's.  Placed on 2L via NC.

## 2021-05-18 ENCOUNTER — APPOINTMENT (OUTPATIENT)
Dept: CT IMAGING | Age: 81
DRG: 274 | End: 2021-05-18
Attending: INTERNAL MEDICINE
Payer: MEDICARE

## 2021-05-18 ENCOUNTER — APPOINTMENT (OUTPATIENT)
Dept: GENERAL RADIOLOGY | Age: 81
DRG: 274 | End: 2021-05-18
Attending: INTERNAL MEDICINE
Payer: MEDICARE

## 2021-05-18 PROBLEM — R07.1 CHEST PAIN ON BREATHING: Status: ACTIVE | Noted: 2018-03-22

## 2021-05-18 LAB
ANION GAP SERPL CALCULATED.3IONS-SCNC: 12 MMOL/L (ref 3–16)
BILIRUBIN URINE: NEGATIVE
BLOOD, URINE: NEGATIVE
BUN BLDV-MCNC: 39 MG/DL (ref 7–20)
CALCIUM SERPL-MCNC: 9.1 MG/DL (ref 8.3–10.6)
CHLORIDE BLD-SCNC: 105 MMOL/L (ref 99–110)
CLARITY: CLEAR
CO2: 24 MMOL/L (ref 21–32)
COLOR: YELLOW
CREAT SERPL-MCNC: 1.7 MG/DL (ref 0.6–1.2)
GFR AFRICAN AMERICAN: 35
GFR NON-AFRICAN AMERICAN: 29
GLUCOSE BLD-MCNC: 101 MG/DL (ref 70–99)
GLUCOSE URINE: NEGATIVE MG/DL
HCT VFR BLD CALC: 42.4 % (ref 36–48)
HEMOGLOBIN: 13.5 G/DL (ref 12–16)
KETONES, URINE: NEGATIVE MG/DL
LEUKOCYTE ESTERASE, URINE: NEGATIVE
MCH RBC QN AUTO: 27 PG (ref 26–34)
MCHC RBC AUTO-ENTMCNC: 31.8 G/DL (ref 31–36)
MCV RBC AUTO: 84.9 FL (ref 80–100)
MICROSCOPIC EXAMINATION: NORMAL
NITRITE, URINE: NEGATIVE
PDW BLD-RTO: 22.8 % (ref 12.4–15.4)
PH UA: 5 (ref 5–8)
PLATELET # BLD: 325 K/UL (ref 135–450)
PMV BLD AUTO: 8.8 FL (ref 5–10.5)
POTASSIUM SERPL-SCNC: 4.6 MMOL/L (ref 3.5–5.1)
PROTEIN UA: NEGATIVE MG/DL
RBC # BLD: 4.99 M/UL (ref 4–5.2)
SODIUM BLD-SCNC: 141 MMOL/L (ref 136–145)
SPECIFIC GRAVITY UA: 1.02 (ref 1–1.03)
URINE TYPE: NORMAL
UROBILINOGEN, URINE: 0.2 E.U./DL
WBC # BLD: 18.9 K/UL (ref 4–11)

## 2021-05-18 PROCEDURE — 6360000002 HC RX W HCPCS: Performed by: NURSE PRACTITIONER

## 2021-05-18 PROCEDURE — 80048 BASIC METABOLIC PNL TOTAL CA: CPT

## 2021-05-18 PROCEDURE — 71046 X-RAY EXAM CHEST 2 VIEWS: CPT

## 2021-05-18 PROCEDURE — 6370000000 HC RX 637 (ALT 250 FOR IP): Performed by: INTERNAL MEDICINE

## 2021-05-18 PROCEDURE — 81003 URINALYSIS AUTO W/O SCOPE: CPT

## 2021-05-18 PROCEDURE — 6360000004 HC RX CONTRAST MEDICATION

## 2021-05-18 PROCEDURE — 99233 SBSQ HOSP IP/OBS HIGH 50: CPT | Performed by: NURSE PRACTITIONER

## 2021-05-18 PROCEDURE — 36415 COLL VENOUS BLD VENIPUNCTURE: CPT

## 2021-05-18 PROCEDURE — 2580000003 HC RX 258: Performed by: NURSE PRACTITIONER

## 2021-05-18 PROCEDURE — 2100000000 HC CCU R&B

## 2021-05-18 PROCEDURE — 71275 CT ANGIOGRAPHY CHEST: CPT

## 2021-05-18 PROCEDURE — 85027 COMPLETE CBC AUTOMATED: CPT

## 2021-05-18 PROCEDURE — 94761 N-INVAS EAR/PLS OXIMETRY MLT: CPT

## 2021-05-18 RX ORDER — SODIUM CHLORIDE 9 MG/ML
INJECTION, SOLUTION INTRAVENOUS CONTINUOUS
Status: DISCONTINUED | OUTPATIENT
Start: 2021-05-18 | End: 2021-05-18

## 2021-05-18 RX ORDER — FUROSEMIDE 10 MG/ML
40 INJECTION INTRAMUSCULAR; INTRAVENOUS ONCE
Status: COMPLETED | OUTPATIENT
Start: 2021-05-18 | End: 2021-05-18

## 2021-05-18 RX ORDER — CALCIUM CARBONATE 200(500)MG
500 TABLET,CHEWABLE ORAL 3 TIMES DAILY PRN
Status: DISCONTINUED | OUTPATIENT
Start: 2021-05-18 | End: 2021-05-19 | Stop reason: HOSPADM

## 2021-05-18 RX ORDER — FUROSEMIDE 10 MG/ML
20 INJECTION INTRAMUSCULAR; INTRAVENOUS ONCE
Status: COMPLETED | OUTPATIENT
Start: 2021-05-18 | End: 2021-05-18

## 2021-05-18 RX ADMIN — SODIUM CHLORIDE: 9 INJECTION, SOLUTION INTRAVENOUS at 09:11

## 2021-05-18 RX ADMIN — ANTACID TABLETS 500 MG: 500 TABLET, CHEWABLE ORAL at 20:12

## 2021-05-18 RX ADMIN — RIVAROXABAN 15 MG: 15 TABLET, FILM COATED ORAL at 08:37

## 2021-05-18 RX ADMIN — ACETAMINOPHEN 650 MG: 325 TABLET ORAL at 08:34

## 2021-05-18 RX ADMIN — LEVOTHYROXINE SODIUM 100 MCG: 0.1 TABLET ORAL at 08:35

## 2021-05-18 RX ADMIN — TOPIRAMATE 25 MG: 25 TABLET, FILM COATED ORAL at 08:37

## 2021-05-18 RX ADMIN — ACETAMINOPHEN 650 MG: 325 TABLET ORAL at 01:13

## 2021-05-18 RX ADMIN — FUROSEMIDE 20 MG: 10 INJECTION, SOLUTION INTRAMUSCULAR; INTRAVENOUS at 18:27

## 2021-05-18 RX ADMIN — ALLOPURINOL 100 MG: 100 TABLET ORAL at 08:36

## 2021-05-18 RX ADMIN — ACETAMINOPHEN 650 MG: 325 TABLET ORAL at 20:13

## 2021-05-18 RX ADMIN — PREDNISONE 10 MG: 5 TABLET ORAL at 08:36

## 2021-05-18 RX ADMIN — TORSEMIDE 10 MG: 20 TABLET ORAL at 08:34

## 2021-05-18 RX ADMIN — METOPROLOL SUCCINATE 25 MG: 25 TABLET, EXTENDED RELEASE ORAL at 20:12

## 2021-05-18 RX ADMIN — IOPAMIDOL 75 ML: 755 INJECTION, SOLUTION INTRAVENOUS at 09:57

## 2021-05-18 RX ADMIN — DULOXETINE HYDROCHLORIDE 60 MG: 60 CAPSULE, DELAYED RELEASE ORAL at 08:36

## 2021-05-18 RX ADMIN — FUROSEMIDE 40 MG: 10 INJECTION, SOLUTION INTRAMUSCULAR; INTRAVENOUS at 11:53

## 2021-05-18 RX ADMIN — METOPROLOL SUCCINATE 25 MG: 25 TABLET, EXTENDED RELEASE ORAL at 08:37

## 2021-05-18 ASSESSMENT — PAIN SCALES - GENERAL
PAINLEVEL_OUTOF10: 4
PAINLEVEL_OUTOF10: 3
PAINLEVEL_OUTOF10: 5
PAINLEVEL_OUTOF10: 6

## 2021-05-18 ASSESSMENT — PAIN DESCRIPTION - PROGRESSION
CLINICAL_PROGRESSION: GRADUALLY WORSENING

## 2021-05-18 NOTE — PROGRESS NOTES
@2030- Pt reporting 10/10 pain on R lateral chest, radiating to the right lower back. Pt states tylenol is not helping. Call placed to [facility] on call center to provide updates. @0730- Call received from Kayla Varma NP. Order received for one time dose of 2mg morphine now. Patient had uneventful night. VSS. Pt struggling with lower lateral chest/lower back pain intermittently, placed ice pack on right lower back, pt reports it helped. @4506- Handoff with Sharee Jackson RN.

## 2021-05-18 NOTE — PROGRESS NOTES
Nicolas 81   Daily Progress Note      Admit Date:  5/17/2021    Reason for follow up visit: R lateral chest pain    CC: \"I feel terrible. I can't get a good breath and my chest really hurts. I can;t move. \"    [de-identified] y/o female with PMH significant for PAF/ prior DCCV in 2008/ RFA in 4/2009, S/PPM in 11/2013 for SSS/AV block/generator 1/25/2021 with new RA lead implant, CAD/CABG/multiple interventions, atrial flutter with DCCV in 8/2017, CHF, PAD with femoral to femoral bypass and L femoral to popliteal bypass in 8/2019, severe restrictive pulmonary disease and followed by pulmonary and prior smoker who was admitted for placement of Watchman LAAC device d/t recurrent falls with resultant admission to [facility] in 12/2020 for concerns of L kidney contusion. Given her H/O falls with injury, Watchman LAAC device was recommended. On 5/17/2021 she underwent placement of size 27 Watchman LAAC device. POst procedure she developed pleuritic pain and was admitted for overnight monitoring. Interval History:  Pt. seen and examined; records reviewed  Pain worse this AM and having difficulty with inspiration  Some worsening of pain on palpation  BP stable. Post procedure echo: trivial pericardial effusion    Subjective:  Pt with no acute overnight cardiac events.   + R lateral chest pain, SOB; difficulty with deep breath  Denies cough, palpitations or dizziness    Review of Systems:   · Constitutional: no unanticipated weight loss. There's been no change in energy level, sleep pattern, or activity level. No fevers, chills. · Eyes: No visual changes or diplopia. No scleral icterus. · ENT: No Headaches, hearing loss or vertigo. No mouth sores or sore throat. · Cardiovascular: as reviewed in HPI  · Respiratory: No cough or wheezing, no sputum production. No hematemesis. · Gastrointestinal: No abdominal pain, appetite loss, blood in stools. No change in bowel or bladder habits.   · Genitourinary: No dysuria, Oral Daily    topiramate  25 mg Oral Daily    levothyroxine  100 mcg Oral Daily with breakfast    rivaroxaban  15 mg Oral Daily with breakfast      sodium chloride      sodium chloride Stopped (05/17/21 1358)    sodium chloride       sodium chloride, nitroGLYCERIN, acetaminophen    Lab Data:  CBC:   Recent Labs     05/17/21  1545   WBC 10.6   HGB 13.1        BMP:    Recent Labs     05/17/21  1545      K 4.9   CO2 22   BUN 44*   CREATININE 1.4*     Limited TTE DATE: 05/17/21   There is a trivial pericardial effusion noted.     5/17/2021 Watchman LAAC device:  · Percutaneous transcatheter closure of the left atrial appendage with endocardial implant, Watchman Flx device  · Size 27 Watchman Flx device  · DONA did not show any pericardial effusion. ECG:  Electronic pacemaker     4/14/2021 DONA:   Overall left ventricular systolic function appears mildly reduced. Ejection   fraction is visually estimated to be 45-50%.     Decreased mobility of posterior mitral valve leaflet. Moderate mitral   regurgitation with calculated ERO of 0.26 cm2.      Moderate tricuspid regurgitation.     Echo 4/30/2020:   Limited echo      Left ventricular cavity size is normal. Normal left ventricular wall   thickness. Left ventricular function appears to be reduced with an estimated   ejection fraction of 45%. Diffuse hypokinesis. Cardiac cath 5/20/2019:     LM       Normal              LAD     50% ostial, mid 100%              Cx        40% OM1 at bifurcation              RCA     Mid 100%              LVG     Not performed              EDP     15 mmHg              L-LAD  Patent              S-PDA Known occluded  Severe Ca++:None  Post Cath Dx:Stable CAD, no apparent culprit for NSTEMI  Intervention:  None      Telemetry: SR with PVC's; paced    Assessment/Plan:    1.  Paroxysmal atrial fibrillation  -S/P Watchman placement on 5/17/2021  -CHADS score: 6  -on ASA and Xarelto currently (reevaluate with DONA in 45 days)  -not a good long term anticoagulation candidate d/t falls  -prior DCCV in 8/2017 and RFA for atrial fib at [facility] in 2009  -continue BB    2. R lateral chest and pleuritic pain  -? Etiology  -CXR unremarkable on 5/17/2021  -will ask for CTA chest    3. CAD/prior CABG  -last cath in 2019 and maintained on medical management  -no recurrent angina  -continue medical management with ASA and BB  -intolerant to statin    4.  Chronic kidney disease, stage III  -Cr 1.4 today (has been as high as 1.9)  -will give IV fluids given IV contrast with CT today  -continue to monitor    Electronically signed by TIFFANIE Richter CNP on 5/18/2021 at 9:10 AM

## 2021-05-18 NOTE — PROGRESS NOTES
Late entry due to patient care. 12 - Received report from Sariah Murray RN. No further questions at this time. Assessed patient at bedside. Denies needs. 0800 - Morning assessment completed at this time. Pt complaining of right \"lung pain\" stating \"It hurts when I take a deep breath\". O2 saturation 99% on RA. Pt having frequent PVCs. Offered PRN tylenol with morning medication, will be given with other medications that are due. (See MAR). 0900 - Laura Cardiology NP at bedside. Pt continues to complain of right \"lung pain\". O2 continues to be 99%. CTA of chest ordered. IVF started at this time. Pt on phone with daughter. 1030 -Pt to CT with this RN and transport. Tolerated well. Returned to patient room. 1130 - 40 mg IV lasix ordered at this time. Given IV. Pt tolerated well.  1300 - Pt voided urine via purewick. No complications. Pt continues to complain of right \"lung pain\". 1345 - Pt to chest x ray with this RN and transport. tolerated well. Returned to patient room. 1500 - pt pressed call light and states \"I threw up everywhere and I went to the bathroom on myself\" This RN entered the room, no evidence of emesis or incontinence. Pt states \"I cant see where it happened, but I did it. \".  Manohar connell called at this time. Will notify Kristan Mata. 36 - Dr. Kristan Mata at bedside. Ordered urinalysis and 20 IV lasix ordered for later. States to ambulate patient and see how they do.  1730 - When patient assisted out of bed with 2 RNs, pt urinated all over the floor. RN sat patient down, pt stood back up and urinated again. Processes repeated of cleaning patient up and pt voided again when standing up, yet with depend on. Pt ambulated the halls, continues to complain of  right \"lung pain\". Pt returned to bed. 1800 - Dr. Kristan Mata called to notify, verbal order for PT eval tomorrow. 1915 - Report given to LAQUITA Tran.

## 2021-05-19 VITALS
DIASTOLIC BLOOD PRESSURE: 52 MMHG | OXYGEN SATURATION: 90 % | TEMPERATURE: 98.3 F | HEIGHT: 63 IN | RESPIRATION RATE: 20 BRPM | BODY MASS INDEX: 22.19 KG/M2 | WEIGHT: 125.22 LBS | HEART RATE: 73 BPM | SYSTOLIC BLOOD PRESSURE: 142 MMHG

## 2021-05-19 LAB
ANION GAP SERPL CALCULATED.3IONS-SCNC: 14 MMOL/L (ref 3–16)
BLOOD BANK DISPENSE STATUS: NORMAL
BLOOD BANK PRODUCT CODE: NORMAL
BPU ID: NORMAL
BUN BLDV-MCNC: 45 MG/DL (ref 7–20)
CALCIUM SERPL-MCNC: 9.4 MG/DL (ref 8.3–10.6)
CHLORIDE BLD-SCNC: 105 MMOL/L (ref 99–110)
CO2: 23 MMOL/L (ref 21–32)
CREAT SERPL-MCNC: 1.6 MG/DL (ref 0.6–1.2)
DESCRIPTION BLOOD BANK: NORMAL
GFR AFRICAN AMERICAN: 37
GFR NON-AFRICAN AMERICAN: 31
GLUCOSE BLD-MCNC: 113 MG/DL (ref 70–99)
HCT VFR BLD CALC: 42.5 % (ref 36–48)
HEMOGLOBIN: 13.4 G/DL (ref 12–16)
MCH RBC QN AUTO: 26.4 PG (ref 26–34)
MCHC RBC AUTO-ENTMCNC: 31.4 G/DL (ref 31–36)
MCV RBC AUTO: 84.2 FL (ref 80–100)
PDW BLD-RTO: 23.1 % (ref 12.4–15.4)
PLATELET # BLD: 353 K/UL (ref 135–450)
PMV BLD AUTO: 9.2 FL (ref 5–10.5)
POTASSIUM SERPL-SCNC: 4.4 MMOL/L (ref 3.5–5.1)
RBC # BLD: 5.05 M/UL (ref 4–5.2)
SODIUM BLD-SCNC: 142 MMOL/L (ref 136–145)
WBC # BLD: 17.6 K/UL (ref 4–11)

## 2021-05-19 PROCEDURE — 94761 N-INVAS EAR/PLS OXIMETRY MLT: CPT

## 2021-05-19 PROCEDURE — 80048 BASIC METABOLIC PNL TOTAL CA: CPT

## 2021-05-19 PROCEDURE — 6370000000 HC RX 637 (ALT 250 FOR IP): Performed by: INTERNAL MEDICINE

## 2021-05-19 PROCEDURE — 97162 PT EVAL MOD COMPLEX 30 MIN: CPT

## 2021-05-19 PROCEDURE — 36415 COLL VENOUS BLD VENIPUNCTURE: CPT

## 2021-05-19 PROCEDURE — 99239 HOSP IP/OBS DSCHRG MGMT >30: CPT | Performed by: NURSE PRACTITIONER

## 2021-05-19 PROCEDURE — 85027 COMPLETE CBC AUTOMATED: CPT

## 2021-05-19 PROCEDURE — 97116 GAIT TRAINING THERAPY: CPT

## 2021-05-19 PROCEDURE — 97530 THERAPEUTIC ACTIVITIES: CPT

## 2021-05-19 RX ADMIN — METOPROLOL SUCCINATE 25 MG: 25 TABLET, EXTENDED RELEASE ORAL at 09:03

## 2021-05-19 RX ADMIN — DULOXETINE HYDROCHLORIDE 60 MG: 60 CAPSULE, DELAYED RELEASE ORAL at 09:03

## 2021-05-19 RX ADMIN — TOPIRAMATE 25 MG: 25 TABLET, FILM COATED ORAL at 09:03

## 2021-05-19 RX ADMIN — LEVOTHYROXINE SODIUM 100 MCG: 0.1 TABLET ORAL at 09:04

## 2021-05-19 RX ADMIN — TORSEMIDE 10 MG: 20 TABLET ORAL at 09:03

## 2021-05-19 RX ADMIN — PREDNISONE 10 MG: 5 TABLET ORAL at 09:03

## 2021-05-19 RX ADMIN — RIVAROXABAN 15 MG: 15 TABLET, FILM COATED ORAL at 09:03

## 2021-05-19 RX ADMIN — ALLOPURINOL 100 MG: 100 TABLET ORAL at 09:04

## 2021-05-19 ASSESSMENT — PAIN DESCRIPTION - PROGRESSION
CLINICAL_PROGRESSION: GRADUALLY WORSENING

## 2021-05-19 NOTE — PROGRESS NOTES
Nicolas 81   Daily Progress Note      Admit Date:  5/17/2021    Reason for follow up visit: R lateral chest pain; S/P Watchman    CC: \"I feel better today. That pain is getting better. I can breathe a little easier. \"    [de-identified] y/o female with PMH significant for PAF/ prior DCCV in 2008/ RFA in 4/2009, S/PPM in 11/2013 for SSS/AV block/generator 1/25/2021 with new RA lead implant, CAD/CABG/multiple interventions, atrial flutter with DCCV in 8/2017, CHF, PAD with femoral to femoral bypass and L femoral to popliteal bypass in 8/2019, severe restrictive pulmonary disease and followed by pulmonary and prior smoker who was admitted for placement of Watchman LAAC device d/t recurrent falls with resultant admission to [facility] in 12/2020 for concerns of L kidney contusion. Given her H/O falls with injury, Watchman LAAC device was recommended. On 5/17/2021 she underwent placement of size 27 Watchman LAAC device. Post procedure she developed pleuritic pain and was admitted for further monitoring. Yesterday she c/o increasing pain in her chest and difficulty breathing. CTA chest and CXR performed. She received IV lasix. Interval History:  Pt. seen and examined; records reviewed  BP reviewed. Up in room with assist and walker  R chest pain improving. SOB improved  Cr 1.6 today  I/O not accurate: she was incontinent on multiple occasions yesterday  Wt loss 3# since admit  PT consult noted    Subjective:  Pt with no acute overnight cardiac events. + chest pain and SOB improved  Denies orthopnea/PND, palpitations or dizziness    Review of Systems:   · Constitutional: no unanticipated weight loss. There's been no change in energy level, sleep pattern, or activity level. No fevers, chills. · Eyes: No visual changes or diplopia. No scleral icterus. · ENT: No Headaches, hearing loss or vertigo. No mouth sores or sore throat.   · Cardiovascular: as reviewed in HPI  · Respiratory: No cough or wheezing, no sputum Oral Daily    allopurinol  100 mg Oral Daily    torsemide  10 mg Oral Daily    topiramate  25 mg Oral Daily    levothyroxine  100 mcg Oral Daily with breakfast    rivaroxaban  15 mg Oral Daily with breakfast      sodium chloride Stopped (05/17/21 1358)    sodium chloride       calcium carbonate, sodium chloride, nitroGLYCERIN, acetaminophen    Lab Data:  CBC:   Recent Labs     05/17/21  1545 05/18/21  1116   WBC 10.6 18.9*   HGB 13.1 13.5    325     BMP:    Recent Labs     05/17/21  1545 05/18/21  1116 05/19/21  0508    141 142   K 4.9 4.6 4.4   CO2 22 24 23   BUN 44* 39* 45*   CREATININE 1.4* 1.7* 1.6*     U/A 5/19/2021: Negative    CXR 5/19/2021:  Mild congestive failure. 5/19/2021 CTA chest:  Bilateral pleural effusions, right greater than left with adjacent   consolidation lungs, likely atelectasis in the absence of clinical signs of   pneumonia.       Atherosclerotic change seen in aorta.  Small aneurysmal outpouching inferior   aspect of aortic arch is unchanged compared to 12/16/2020       Interval placement of watch man device.  There is thrombus seen in the   inferior aspect of the watch man device.  The superior aspect of the left   atrial appendage remains opacified.  Given that the watch man device was so   recently placed, is difficult to evaluate for leakage     Limited TTE DATE: 05/17/21  Alexandra Watson is a trivial pericardial effusion noted.     5/17/2021 Watchman LAAC device:  · Percutaneous transcatheter closure of the left atrial appendage with endocardial implant, Watchman Flx device  · Size 27 Watchman Flx device  · DONA did not show any pericardial effusion.                 ECG:  Electronic pacemaker     4/14/2021 DONA:   Overall left ventricular systolic function appears mildly reduced. Ejection   fraction is visually estimated to be 45-50%.     Decreased mobility of posterior mitral valve leaflet.  Moderate mitral   regurgitation with calculated ERO of 0.26 cm2.      Moderate tricuspid regurgitation.     Echo 4/30/2020:   Limited echo      Left ventricular cavity size is normal. Normal left ventricular wall   thickness. Left ventricular function appears to be reduced with an estimated   ejection fraction of 45%. Diffuse hypokinesis.     Cardiac cath 5/20/2019:     LM       Normal              LAD     50% ostial, mid 100%              Cx        40% OM1 at bifurcation              RCA     Mid 100%              LVG     Not performed              EDP     15 mmHg              L-LAD  Patent              S-PDA Known occluded  Severe Ca++:None  Post Cath Dx:Stable CAD, no apparent culprit for NSTEMI  Intervention:  None    Telemetry: Sinus rhythm; paced    Assessment/Plan:    1. Paroxysmal atrial fibrillation  -S/P Watchman placement on 5/17/2021  -CHADS score: 6  -on ASA and Xarelto currently (reevaluate with DONA in 45 days)  -continue BB  -prior DCCV in 8/2017 and RFA for A fib at [facility] in 2009  -not a good long term anticoagulation candidate d/t falls     2. R lateral chest and pleuritic pain  -? Etiology: improving  -CXR on 5/18/2021: mild CHF (secondary to volume overload from IVF)  -pain and SOB improved today after IV diuretic     3. CAD/prior CABG  -last cath in 2019 and maintained on medical management  -angina has been quiet  -intolerant to statin  -continue medical management with ASA and BB     4. Chronic kidney disease, stage III  -Cr 1.6 today (has been as high as 1.9)  -elevated d/t contrast last 2 days  -continue to monitor    5.  Leukocytosis  -without fever  -U/A unremarkable  -CXR: no evidence PNA  -continue to monitor as outpt      Plan for discharge today    Follow up scheduled on 5/25/2021 @ 10:30 AM with TIFFANIE Thomas, CNP    Post Watchman instructions discussed and provided to [facility]    BMP and CBC in 5 days    Discharge Meds:  Allopurinol 100 mg daily  Cymbalta 60 mg daily  Levothyroxine  100 mcg daily  Toprol XL 25 mg daily  Prednisone 10 mg daily  Xarelto 15 mg daily  Topamax 25 mg daily  Torsemide 10 mg daily  Vitamin D 50,000 units daily    Electronically signed by TIFFANIE Silver CNP on 5/19/2021 at 8:40 AM

## 2021-05-19 NOTE — PLAN OF CARE
Problem: Pain:  Goal: Pain level will decrease  Description: Pain level will decrease  5/19/2021 1208 by Etta Rg RN  Outcome: Ongoing  5/19/2021 0552 by Rolland Gosselin, RN  Outcome: Ongoing  Goal: Control of acute pain  Description: Control of acute pain  Outcome: Ongoing  Goal: Control of chronic pain  Description: Control of chronic pain  Outcome: Ongoing     Problem: Falls - Risk of:  Goal: Will remain free from falls  Description: Will remain free from falls  5/19/2021 1208 by Etta Rg RN  Outcome: Ongoing  5/19/2021 0552 by Rolland Gosselin, RN  Outcome: Ongoing  Goal: Absence of physical injury  Description: Absence of physical injury  Outcome: Ongoing     Problem: OXYGENATION/RESPIRATORY FUNCTION  Goal: Patient will maintain patent airway  Outcome: Ongoing  Goal: Patient will achieve/maintain normal respiratory rate/effort  Description: Respiratory rate and effort will be within normal limits for the patient  Outcome: Ongoing     Problem: HEMODYNAMIC STATUS  Goal: Patient has stable vital signs and fluid balance  5/19/2021 1208 by Etta Rg RN  Outcome: Ongoing  5/19/2021 0552 by Rolland Gosselin, RN  Outcome: Ongoing     Problem: FLUID AND ELECTROLYTE IMBALANCE  Goal: Fluid and electrolyte balance are achieved/maintained  5/19/2021 1208 by Etta Rg RN  Outcome: Ongoing  5/19/2021 0552 by Rolland Gosselin, RN  Outcome: Ongoing     Problem: ACTIVITY INTOLERANCE/IMPAIRED MOBILITY  Goal: Mobility/activity is maintained at optimum level for patient  5/19/2021 1208 by Etta Rg RN  Outcome: Ongoing  5/19/2021 0552 by Rolland Gosselin, RN  Outcome: Ongoing
Problem: Pain:  Goal: Pain level will decrease  Description: Pain level will decrease  Outcome: Ongoing     Problem: Falls - Risk of:  Goal: Will remain free from falls  Description: Will remain free from falls  Outcome: Ongoing     Problem: HEMODYNAMIC STATUS  Goal: Patient has stable vital signs and fluid balance  Outcome: Ongoing     Problem: FLUID AND ELECTROLYTE IMBALANCE  Goal: Fluid and electrolyte balance are achieved/maintained  Outcome: Ongoing     Problem: ACTIVITY INTOLERANCE/IMPAIRED MOBILITY  Goal: Mobility/activity is maintained at optimum level for patient  Outcome: Ongoing
Problem: Pain:  Goal: Pain level will decrease  Description: Pain level will decrease  Outcome: Ongoing  Note: Assessed for pain utilizing appropriate scale. Reposition/non-pharmacological interventions and/or medicate as needed, notify physician for unmanaged pain. Reassess for effectiveness of medication.        Problem: Falls - Risk of:  Goal: Will remain free from falls  Description: Will remain free from falls  Outcome: Ongoing
achieved/maintained  5/19/2021 1209 by Ankur Chou RN  Outcome: Completed  5/19/2021 1208 by Ankur Chou RN  Outcome: Ongoing  5/19/2021 0552 by Porsha Alexander RN  Outcome: Ongoing     Problem: ACTIVITY INTOLERANCE/IMPAIRED MOBILITY  Goal: Mobility/activity is maintained at optimum level for patient  5/19/2021 1209 by Ankur Chou RN  Outcome: Completed  5/19/2021 [address] by Ankur Chou RN  Outcome: Ongoing  5/19/2021 0552 by Porsha Alexander RN  Outcome: Ongoing

## 2021-05-19 NOTE — PROGRESS NOTES
Physical Therapy    Facility/Department: [facility]  Initial Assessment    NAME: Erlinda Serrano  : 1940  MRN: [id number]    Date of Service: 2021    Discharge Recommendations:  Home with family assist/support. PT Equipment Recommendations  Other: Will monitor for any equipt needs. Assessment   Body structures, Functions, Activity limitations: Decreased functional mobility ; Decreased endurance  Assessment: [de-identified] y/o female admit 2021 with Paroxysmal A-Fib.  2021 S/P Watchman Device. Post procedure onset Pleuritic Pain. CTA Chest B Pleural Effusion. PMH as noted including CAD, CABG,TIA, CKD, PVD, L1 Kyphoplasty (2021), Gout. PTA pt living in own home with dtr; 2 step to enter and 1st floor bed/bath. Pt reports independent daily care and functional mobility (with Rose Zaragoza). Pt reports adequate assist/support upon return home. Do not anticipate need cont PT Services upon d/c. Will monitor pt's progress. Prognosis: Good  Decision Making: Medium Complexity  History: [de-identified] y/o female admit 2021 with Paroxysmal A-Fib.  2021 S/P Watchman Device. Post procedure onset Pleuritic Pain. CTA Chest B Pleural Effusion. PMH as noted including CAD, CABG,TIA, CKD, PVD, L1 Kyphoplasty (2021), Gout. Exam: See above. Clinical Presentation: See above. Patient Education: Role of PT, POC, Need to call for assist, Safe use of Walker. Barriers to Learning: [place]. REQUIRES PT FOLLOW UP: Yes  Activity Tolerance  Activity Tolerance: Patient Tolerated treatment well       Patient Diagnosis(es): Diagnoses of PAF (paroxysmal atrial fibrillation) ([facility] [address] and Presence of Watchman left atrial appendage closure device were pertinent to this visit.      has a past medical history of Allergic rhinitis, cause unspecified, Arthritis, Atrial fibrillation (HCC), Bronchopneumonia, CAD (coronary artery disease), Cerebral artery occlusion with cerebral infarction [facility]), Chronic gouty arthropathy, Chronic kidney disease, Congestive heart failure, unspecified, Degeneration of cervical intervertebral disc, Essential and other specified forms of tremor, Gout, HIGH CHOLESTEROL, History of CVA (cerebrovascular accident), Hx of blood clots, Hypertension, Influenza, Mitral valve stenosis and aortic valve insufficiency, Movement disorder, Pacemaker, Peptic ulcer, unspecified site, unspecified as acute or chronic, without mention of hemorrhage, perforation, or obstruction, Thyroid disease, Unspecified disorder of kidney and ureter, and Unspecified hypothyroidism. has a past surgical history that includes back surgery; Cholecystectomy; Cardiac surgery; Coronary artery bypass graft (1987); shoulder surgery; Cardiac catheterization (7/2012); hip surgery (Left, 03/16/2017); joint replacement; Cardiac catheterization (08/07/2018); Percutaneous Transluminal Coronary Angio (11/04/2014); pr njx aa&/strd tfrml epi lumbar/sacral 1 level (Right, 12/3/2018); Femoral-femoral Bypass Graft (N/A, 8/22/2019); femoral bypass (Left, 8/22/2019); and IR KYPHOPLASTY LUMBAR 1 VERTEBRAL BODY (5/14/2021). Restrictions  Restrictions/Precautions  Restrictions/Precautions: Fall Risk, Up as Tolerated  Vision/Hearing  Vision: Within Functional Limits (Wears Glasses.)  Hearing: Exceptions to [facility] Exceptions: Hard of hearing/hearing concerns     Subjective  General  Chart Reviewed: Yes  Patient assessed for rehabilitation services?: Yes  Additional Pertinent Hx: [de-identified] y/o female admit 5/17/2021 with Paroxysmal A-Fib.  5/17/2021 S/P Watchman Device. Post procedure onset Pleuritic Pain. CTA Chest B Pleural Effusion. PMH as noted including CAD, CABG,TIA, CKD, PVD, L1 Kyphoplasty (5/14/2021), Gout. Family / Caregiver Present: No  Referring Practitioner: Dr. Jb Miller  Diagnosis: Paroxysmal A-Fib. Follows Commands: Within Functional Limits  Subjective  Subjective: Pt agreeable to PT Eval/Rx.   Pain Screening  Patient Currently in Pain: Denies Orientation  Orientation  Overall Orientation Status: Within Functional Limits (Alittle forgetful.)  Social/Functional History  Social/Functional History  Lives With: Daughter (Dtr lives with pt.)  Type of Home: House  Home Layout: One level  Home Access: Stairs to enter without rails (2 steps to enter.)  Bathroom Shower/Tub: Tub/Shower unit  Bathroom Toilet: Handicap height  Bathroom Equipment: Shower chair  Bathroom Accessibility: Accessible  Home Equipment: 4 wheeled walker, Cane  ADL Assistance: [facility]:  (Dtr assists with homemaking needs.)  Ambulation Assistance: Independent (With Tita Fiore.)  Transfer Assistance: Independent  Active : No (Dtr drives.)  Additional Comments: Pt reports dtr able to provide assist needed upon d/c. Cognition   Cognition  Overall Cognitive Status: Exceptions  Safety Judgement: Decreased awareness of need for safety  Insights: Decreased awareness of deficits    Objective          AROM RLE (degrees)  RLE AROM: WFL  RLE General AROM: Tight endrange Knee Ext. AROM LLE (degrees)  LLE AROM : WFL  LLE General AROM: Tight endrange Knee Ext. AROM RUE (degrees)  RUE AROM : WFL  AROM LUE (degrees)  LUE AROM : WFL  Strength RLE  Strength RLE: WFL  Strength LLE  Strength LLE: WFL  Strength RUE  Strength RUE: WFL  Strength LUE  Strength LUE: WFL     Sensation  Overall Sensation Status: Impaired (B LE Neuropathy.)  Bed mobility  Supine to Sit: Stand by assistance  Transfers  Sit to Stand: Stand by assistance (With Tita Fiore. Initial Cues for safe hand placement.)  Stand to sit: Stand by assistance (With Tita Fiore. Initial Cues for safe hand placement.)  Comment: Toilet Transfer SBA. Pt able to complete [facility]icare and don/doff depends without assist.  Ambulation  Ambulation?: Yes  Ambulation 1  Surface: level tile  Distance: Pt amb to/from bathroom, additional 125' with Walker SBA. Diminished step length/clearance although no LE buckling/giving way.   Limited

## 2021-05-19 NOTE — DISCHARGE SUMMARY
[address]    DISCHARGE SUMMARY      Patient ID:  Carson Rivera  [id number] y.o. 1940    Admit date: 5/17/2021    Discharge date:  05/19/2021    Admitting Physician: Rehan Yo MD     Discharge Physician: TIFFANIE Lara - CNP     Admission Diagnoses: PAF (paroxysmal atrial fibrillation) ([address] [I48.0]  S/P Watchman LAAC device placement    Discharge Diagnoses:   1. Paroxysmal atrial fibrillation  -S/P Watchman placement on 5/17/2021     2. R lateral chest and pleuritic pain  -? Etiology: improving     3. CAD/prior CABG     4. Chronic kidney disease, stage III    5. Leukocytosis  -without fever     Discharged Condition: fair    Hospital Course: Carson Rivera was admitted S/P Watchman placement d/t R lateral chest pain . She is an [de-identified] y/o female with PMH significant for PAF/ prior DCCV in 2008/ RFA in 4/2009, S/PPM in 11/2013 for SSS/AV block/generator 1/25/2021 with new RA lead implant, CAD/CABG/multiple interventions, atrial flutter with DCCV in 8/2017, CHF, PAD with femoral to femoral bypass and L femoral to popliteal bypass in 8/2019, severe restrictive pulmonary disease and followed by pulmonary and prior smoker who was admitted for placement of Watchman LAAC device d/t recurrent falls with resultant admission to [facility] in 12/2020 for concerns of L kidney contusion. Given her H/O falls with injury, Watchman LAAC device was recommended. On 5/17/2021 she underwent placement of size 27 Watchman LAAC device. Post procedure she developed pleuritic pain and was admitted for further monitoring. Yesterday she c/o increasing pain in her chest and difficulty breathing. CTA chest and CXR performed. She received IV lasix and sxs improved this AM. Has worked with PT and tolerated well.      Consults:  None    Significant Diagnostic Studies:   5/19/2021 CTA chest:  Bilateral pleural effusions, right greater than left with adjacent   consolidation lungs, likely atelectasis in the absence of clinical signs of   pneumonia.       Atherosclerotic change seen in aorta.  Small aneurysmal outpouching inferior   aspect of aortic arch is unchanged compared to 12/16/2020       Interval placement of watch man device.  There is thrombus seen in the   inferior aspect of the watch man device.  The superior aspect of the left   atrial appendage remains opacified.  Given that the watch man device was so   recently placed, is difficult to evaluate for leakage      Limited TTE DATE: 05/17/21  Ronna Copper is a trivial pericardial effusion noted.     5/17/2021 Watchman LAAC device:  · Percutaneous transcatheter closure of the left atrial appendage with endocardial implant, Watchman Flx device  · Size 27 Watchman Flx device  · DONA did not show any pericardial effusion.                 ECG:  Electronic pacemaker     4/14/2021 DONA:   Overall left ventricular systolic function appears mildly reduced. Ejection   fraction is visually estimated to be 45-50%.     Decreased mobility of posterior mitral valve leaflet.  Moderate mitral   regurgitation with calculated ERO of 0.26 cm2.      Moderate tricuspid regurgitation.         Labs:   Lab Results   Component Value Date    CREATININE 1.6 (H) 05/19/2021    BUN 45 (H) 05/19/2021     05/19/2021    K 4.4 05/19/2021     05/19/2021    CO2 23 05/19/2021      Lab Results   Component Value Date    WBC 17.6 (H) 05/19/2021    HGB 13.4 05/19/2021    HCT 42.5 05/19/2021    MCV 84.2 05/19/2021     05/19/2021      Lab Results   Component Value Date    INR 1.01 05/14/2021    PROTIME 11.7 05/14/2021      Lab Results   Component Value Date    BNP 3,647 (A) 07/17/2019       Disposition: home    Patient Instructions:    Marry Gil   Home Medication Instructions FHA:[id number]    Printed on:05/19/21 1053   Medication Information                      allopurinol (ZYLOPRIM) 100 MG tablet  Take 1 tablet by mouth daily             aspirin EC 81 MG EC tablet  Take 1 tablet by mouth daily             DULoxetine (CYMBALTA) 60 MG extended release capsule  Take 1 capsule by mouth daily             levothyroxine (SYNTHROID) 112 MCG tablet  TAKE 1 TABLET EVERY DAY             lidocaine (LIDODERM) 5 %  Place 1 patch onto the skin daily 12 hours on, 12 hours off.             metoprolol succinate (TOPROL XL) 25 MG extended release tablet  Take 1 tablet by mouth 2 times daily             nitroGLYCERIN (NITROLINGUAL) 0.4 MG/SPRAY 0.4 mg spray  Place 1 spray under the tongue every 5 minutes as needed for Chest pain             predniSONE (DELTASONE) 10 MG tablet  Take 1 tablet by mouth daily             rivaroxaban (XARELTO) 15 MG TABS tablet  Take 1 tablet by mouth daily (with breakfast)             topiramate (TOPAMAX) 50 MG tablet  TAKE 2 TABLETS TWICE DAILY             torsemide (DEMADEX) 10 MG tablet  TAKE 1 TABLET EVERY OTHER DAY ALTERNATING WITH  2  TABLETS EVERY OTHER DAY             vitamin D (ERGOCALCIFEROL) 1.25 MG (61387 UT) CAPS capsule  Take 1 capsule by mouth once a week                 Follow-up with Denzil Severance, CNP on 5/25/2021 @ 10:30 AM    Labs: BMP and CBC prior to visit    Please see today's progress note for discharge physical exam    40 minutes spent on pt's discharge today.     Signed:  TIFFANIE Conley CNP on 5/19/2021 at 10:53 AM    [facility], [address] [place] [address]  Phone: [phone]  Fax: [phone]

## 2021-05-19 NOTE — PROGRESS NOTES
Pt discharged home at [phone]. Discharge instructions given and explained. All questions explained. IV is removed. Dressing applied to site. Transportation to home provided by daughter.

## 2021-05-20 ENCOUNTER — CARE COORDINATION (OUTPATIENT)
Dept: CASE MANAGEMENT | Age: 81
End: 2021-05-20

## 2021-05-20 NOTE — CARE COORDINATION
Care Transitions Outreach Attempt    Call within 2 business days of discharge: Yes   Attempted to reach patient for transitions of care follow up. Unable to reach patient. Patient: Alicia Welsh Patient : 1940 MRN: [id number]    Last Discharge [facility]       Complaint Diagnosis Description Type Department Provider    21  Iron deficiency anemia, unspecified iron deficiency anemia type . .. Admission (Discharged) from [facility]            Was this an external facility discharge?  No Discharge Facility: N/A    Noted following upcoming appointments from discharge chart review:   [facility] follow up appointment(s):   Future Appointments   Date Time Provider Triston oFrd   2021  3:30 PM SCHEDULE, [place] REMOTE TRANSMISSION FF Cardio MMA   2021 11:15 AM Yvonne Nurse, APRN - CNP FF Cardio MMA    47:92 AM Leeanna Fallon APRN - CNP AFL NASO AFL NASO   2021 12:45 PM [facility] CARDIAC CATH LAB PRE/POST ROOM [facility] CATH [facility]   2021  1:00 PM Tory Raymundo APRN - CNS [place] CARDIO MMA   2021  3:30 PM SCHEDULE, [place] REMOTE TRANSMISSION FF Cardio MMA   2021 11:00 AM Veronica Mancia APRN - CNP FF Cardio MMA   2021  3:30 PM SCHEDULE, [place] REMOTE TRANSMISSION FF Cardio MMA   2022  3:30 PM SCHEDULE, [place] REMOTE TRANSMISSION FF Cardio MMA   2022  3:30 PM SCHEDULE, [place] REMOTE TRANSMISSION FF Cardio MMA   2022  3:30 PM SCHEDULE, [place] REMOTE TRANSMISSION FF Cardio MMA   2022  3:30 PM SCHEDULE, [facility] TRANSMISSION FF Cardio MMA     Non-BS follow up appointment(s): none

## 2021-05-21 ENCOUNTER — CARE COORDINATION (OUTPATIENT)
Dept: CASE MANAGEMENT | Age: 81
End: 2021-05-21

## 2021-05-21 NOTE — CARE COORDINATION
contact information. 7-10 days based on severity of symptoms and risk factors. Non-face-to-face services provided:  Obtained and reviewed discharge summary and/or continuity of care documents    Care Transitions 24 Hour Call    Do you have any ongoing symptoms?: No  Do you have a copy of your discharge instructions?: Yes  Do you have all of your prescriptions and are they filled?: Yes  Have you been contacted by a [address]?: No  Have you scheduled your follow up appointment?: Yes  How are you going to get to your appointment?: Car - family or friend to transport  Were you discharged with any Home Care or Post Acute Services: No  Post Acute Services: Home Health (Comment: [facility])  Patient DME: Other  Other Patient DME: grab bar in shower   Do you have support at home?: Partner/Spouse/SO  Do you feel like you have everything you need to keep you well at home?: Yes  Are you an active caregiver in your home?: No  Care Transitions Interventions       2nd attempt at [facility] SYSTEM discharge phone call. Kanchan Austin states she is \"pretty well\". She confirms her appointment with cardiology on 05/26/2021. Kanchan Avila states she will reach out to her PCP to schedule a hospital follow up. She states her daughter will provide her transportation to both appointments. Kanchan Avila states her puncture site is fine. She states she is up and about. Kanchan Avila denies any fever. She states her bowels are moving & she is urinating. Kanchan Avila states she is tolerating food & fluids - denies any N/V. She states her appetite is poor. She states she usually weighs herself daily, but has not done so today. She states she slept in today. Kanchan Avila denies any SOB, chest pain, or edema. She states she follows a heart healthy diet and monitors/restricts her fluids daily. Discussed the heart failure zones. Kanchan Austin places herself in the \"green\" zone today. Kanchan Avila denies any needs at this time.  Encouraged her to reach out to any of her physician with any non life threatening medical issues or concerns over the weekend.     Follow Up  Future Appointments   Date Time Provider Triston Liliana   5/25/2021  3:30 PM SCHEDULE, [facility] TRANSMISSION FF Cardio MMA   5/26/2021 11:15 AM TIFFANIE Szymanski CNP FF Cardio MMA   2/69/4857 60:65 AM TIFFANIE Drake CNP AFL NASO AFL NASO   7/7/2021 12:45 PM [facility] CARDIAC CATH LAB PRE/POST ROOM [facility] CATH [facility]   7/8/2021  1:00 PM TIFFANIE Harris [place] CARDIO MMA   8/24/2021  3:30 PM SCHEDULE, [facility] TRANSMISSION FF Cardio MMA   11/17/2021 11:00 AM Joycelyn Apley, APRN - CNP FF Cardio MMA   11/23/2021  3:30 PM SCHEDULE, [facility] TRANSMISSION FF Cardio MMA   2/22/2022  3:30 PM SCHEDULE, [facility] TRANSMISSION FF Cardio MMA   5/24/2022  3:30 PM SCHEDULE, [place] REMOTE TRANSMISSION FF Cardio MMA   8/23/2022  3:30 PM SCHEDULE, [place] REMOTE TRANSMISSION FF Cardio MMA   11/22/2022  3:30 PM SCHEDULE, [facility] TRANSMISSION FF Cardio MMA       Anahi Manley RN

## 2021-05-23 NOTE — PROGRESS NOTES
[address]   Electrophysiology  TIFFANIE Granda-CNP  Attending EP: Dr. Estephania Johnson    Date: 5/26/2021  I had the privilege of visiting Colleen Louis in the office. Chief Complaint:   Chief Complaint   Patient presents with    Follow-up     post watchman     History of Present Illness: History obtained from patient and medical record. Colleen Louis is [de-identified] y.o. female with a past medical history of CAD s/p CABG, PAD, HTN, HLD, CKD, CHF, atrial fibrillation, SSS s/p PPM (11/13), and TIA. S/p Watchman (5/17/21)    -Interval history: Today, Colleen Louis is being seen for routine follow up. Her daughter is present for the visit. She just had a Watchman implanted on May 17th with Dr. Estephania Johnson. She feels well since that time and denies any issues. Her groin incision site is healing nicely, no swelling/hematoma/oozing. She is in sinus rhythm on EKG today. We discussed the need for DONA on July 7th to assess for feliciano-device leak. She is eager to get off the Xarelto due to issues with anti-coagulation in the past. Her weight is stable from prior OV, no s/s of CHF. She is hopeful to get her strength and energy back so that she can start doing more yard work and things she enjoys. She recently had back surgery as well and is still recovering from that. Denies having chest pain, palpitations, shortness of breath, orthopnea/PND, cough, or dizziness at the time of this visit. With regard to medication therapy the patient has been compliant with prescribed regimen. They have tolerated therapy to date. Allergies:   Allergies   Allergen Reactions    Aspirin Nausea Only    Ativan [Lorazepam] Other (See Comments)     hallucinations    Diltiazem Anaphylaxis    Sulfa Antibiotics Rash and Hives    Dabigatran Nausea Only     And indigestion  And indigestion    Aka Pradaxa    Lipitor [Atorvastatin] Other (See Comments)     Muscle pains    Mysoline [Primidone]     Nsaids     Other Other (See Comments) Nitroglycerin patches causes severe headaches     Home Medications:  Prior to Visit Medications    Medication Sig Taking? Authorizing Provider   allopurinol (ZYLOPRIM) 100 MG tablet TAKE 1 TABLET EVERY DAY (ALONG WITH 300MG TABLET) Yes Magali Joshua MD   torsemide (DEMADEX) 10 MG tablet TAKE 1 TABLET EVERY OTHER DAY ALTERNATING WITH  2  TABLETS EVERY OTHER DAY Yes Magali Joshua MD   aspirin EC 81 MG EC tablet Take 1 tablet by mouth daily Yes TIFFANIE Medrano CNP   rivaroxaban (XARELTO) 15 MG TABS tablet Take 1 tablet by mouth daily (with breakfast) Yes TIFFANIE Ramirez CNP   levothyroxine (SYNTHROID) 112 MCG tablet TAKE 1 TABLET EVERY DAY Yes Magali Joshua MD   lidocaine (LIDODERM) 5 % Place 1 patch onto the skin daily 12 hours on, 12 hours off.  Yes Amira Mena PA-C   topiramate (TOPAMAX) 50 MG tablet TAKE 2 TABLETS TWICE DAILY Yes Magali Joshua MD   nitroGLYCERIN (NITROLINGUAL) 0.4 MG/SPRAY 0.4 mg spray Place 1 spray under the tongue every 5 minutes as needed for Chest pain Yes Kiki Dumont MD   metoprolol succinate (TOPROL XL) 25 MG extended release tablet Take 1 tablet by mouth 2 times daily Yes Magali Joshua MD   vitamin D (ERGOCALCIFEROL) 1.25 MG (53752 UT) CAPS capsule Take 1 capsule by mouth once a week Yes Magali Joshua MD      Past Medical History:  Past Medical History:   Diagnosis Date    Allergic rhinitis, cause unspecified     Arthritis     Atrial fibrillation ([address]     Bronchopneumonia     CAD (coronary artery disease)     stent:  post cataract surgery (CABG)    Cerebral artery occlusion with cerebral infarction ([address]     TIA    Chronic gouty arthropathy     Chronic kidney disease     Congestive heart failure, unspecified     Degeneration of cervical intervertebral disc     Essential and other specified forms of tremor     Gout     HIGH CHOLESTEROL     History of CVA (cerebrovascular accident)     Hx of blood clots     Hypertension  Influenza 12/23/2017    Mitral valve stenosis and aortic valve insufficiency     Movement disorder     back problems    Pacemaker     Peptic ulcer, unspecified site, unspecified as acute or chronic, without mention of hemorrhage, perforation, or obstruction     Thyroid disease     Unspecified disorder of kidney and ureter     Unspecified hypothyroidism      Past Surgical History:    has a past surgical history that includes back surgery; Cholecystectomy; Cardiac surgery; Coronary artery bypass graft (1987); shoulder surgery; Cardiac catheterization (7/2012); hip surgery (Left, 03/16/2017); joint replacement; Cardiac catheterization (08/07/2018); Percutaneous Transluminal Coronary Angio (11/04/2014); pr njx aa&/strd tfrml epi lumbar/sacral 1 level (Right, 12/3/2018); Femoral-femoral Bypass Graft (N/A, 8/22/2019); femoral bypass (Left, 8/22/2019); and IR KYPHOPLASTY LUMBAR 1 VERTEBRAL BODY (5/14/2021). Social History:  Reviewed. reports that she quit smoking about 34 years ago. Her smoking use included cigarettes. She has a 28.00 pack-year smoking history. She has never used smokeless tobacco. She reports previous alcohol use. She reports that she does not use drugs. Family History:  Reviewed. family history includes Cancer in her maternal grandmother, paternal grandmother, and sister; Diabetes in her brother and maternal uncle; Heart Disease in her brother, maternal aunt, and sister; Hypertension in her brother and paternal aunt; Stroke in her maternal aunt.      Review of System:  · Constitutional: Negative for fever, night sweats, chills, weight changes, or weakness  · Skin: Negative for rash, dry skin, pruritus, bruising, bleeding, blood clots, or changes in skin pigment  · HEENT: Negative for vision changes, ringing in the ears, sore throat, dysphagia, or swollen lymph nodes  · Respiratory: Reviewed in HPI  · Cardiovascular: Reviewed in HPI  · Gastrointestinal: Negative for abdominal pain, CO2 23 2021     Estimated Creatinine Clearance: 23 mL/min (A) (based on SCr of 1.6 mg/dL (H)). Thyroid:   Lab Results   Component Value Date    TSH 0.90 2021    N4QPEAC 7.3 10/03/2018     Lipid Panel:   Lab Results   Component Value Date    CHOL 120 2020    HDL 29 2020    HDL 31 2012    TRIG 97 2020     LFTs:  Lab Results   Component Value Date    ALT 11 2020    AST 18 2020    ALKPHOS 80 2020    BILITOT 0.5 2020     Coags:   Lab Results   Component Value Date    PROTIME 11.7 2021    INR 1.01 2021    APTT 30.8 2021       EC2021  - NSR with PVC    Echo:   Left ventricle - normal size, thickness, reduced function with EF of 45%  LV wall motion - diffuse hypokinesis. Mitral valve - thickened, annular calcification, moderate regurgitation  Aortic valve - thickened, calcified, mild regurgitation  Tricuspid valve - mild regurgitation with PASP of 25mmHg  Pulmonic valve - trivial regurgitation  Biatrial enlargement  Pacemaker / ICD lead is visualized in the right heart. DONA:    There is no evidence of pericardial effusion at the start of the procedure. The left atrial appendage appears free of thrombus. Left atrial appendage ostial measurements are as follows   46 degrees 1.99cm   136 degrees 2.01cm   140 degrees 2.27cm   A 27mm Watchman device is successfully deployed into the left atrial appendage. The device appears adequately compressed. There is no evidence of leak by color Doppler. There is no evidence of effusion upon completion of the procedure. GXT:    Normal rest myocardial perfusion.    No fixed defect to suggest scar.    Normal perfusion c/w normal viability. Assessment:    1. Paroxysmal Atrial Fibrillation  - S/p Watchman Implantation (21)    - Currently in NSR with PVC  - Continue Toprol XL 25 mg BID  - [id number] score:high; [id number] Vasc score and anticoagulation discussed.  High risk for stroke and thromboembolism. Anticoagulation is recommended. Risk of bleeding was discussed.  ~ On Xarelto 15 mg QD; will stop and switch to ASA/plavix following post Watchman DONA    - Afib risk factors including age, HTN, obesity, inactivity and UVALDO were discussed with patient. Risk factor modification recommended       - Scheduled for follow up DONA on July 7th to assess for feliciano-device leak, R/B/A discussed    2. Sick Sinus Syndrome  - S/p Dual chamber [employer] PPM (11/13); s/p gen change (1/21)  - I reviewed device interrogation today. Device functioning normally.   ~ A-paced 46% V-paced 7%  ~ 15 years left on battery life expectancy  - Discussed with patient  - Follow up with device clinic as scheduled    3. Chronic systolic heart failure (NYHA Class III)  - Appears compensated   ~ EF 45% per echo  - Continue with Toprol XL 25 mg BID, torsemide 10 mg/20 mg daily alternating  ~ ACE-I/ARB contraindicated due to renal insufficiency and risk of hyperkalemia  - Aggressive medical therapy with risk factor modification  - Discussed with patient importance of monitoring weight, low sodium diet and fluid restriction  - Followed by CHF team    4. CAD  - Hx of CABG  - Stable  - No complaints of angina  - Continue ASA, BB, and statin   ~ No ASA due to allergy    5. HTN  - Controlled: Goal <130/80  - Continue current medications  - Encouraged to monitor and log BP readings at home, then bring log to next visit  - Discussed importance of low sodium diet, weight control and exercise    Plan:  1. No changes  2. Complete DONA on July 7th at 1245.  Do not eat or drink that morning; do not take your medications that morning    F/U: Follow-up with NPRG in July as scheduled and NPTS in 6 months  -Follow up with device clinic as scheduled  -Call [address] at [phone] with any questions    Diet & Exercise:   The patient is counseled to follow a low salt diet to assure blood pressure remains controlled for cardiovascular risk factor modification   The patient is counseled to avoid excess caffeine, and energy drinks as this may exacerbated ectopy and arrhythmia   The patient is counseled to lose weight to control cardiovascular risk factors   Exercise program discussed: To improve overall cardiovascular health, the patient is instructed to increase cardiovascular related activities with a goal of 150 min/week of moderate level activity or 10,000 steps per day. Encouraged to perform as much activity as tolerated    I have addressed the patient's cardiac risk factors and adjusted pharmacologic treatment as needed. In addition, I have reinforced the need for patient directed risk factor modification. I independently reviewed the device check interrogation and ECG    All questions and concerns were addressed with the patient. Alternatives to treatment were discussed. Thank you for allowing to us to participate in the care of Kierra Stern. Time  30-39 minutes spent preparing to see patient including reviewing patient history/prior tests/prior consults, performing a medical exam, counseling and educating patient/family/caregiver, ordering medications/tests/procedures, referring and communicating with PCPs and other pertinent consultants, documenting information in the EMR, independently interpreting results and communicating to family and coordination of patient care.     TIFFANIE Bear-CNP  [address]   Office: [phone]

## 2021-05-25 RX ORDER — ALLOPURINOL 100 MG/1
TABLET ORAL
Qty: 90 TABLET | Refills: 1 | Status: SHIPPED | OUTPATIENT
Start: 2021-05-25 | End: 2022-02-08

## 2021-05-25 RX ORDER — TORSEMIDE 10 MG/1
TABLET ORAL
Qty: 135 TABLET | Refills: 1 | Status: SHIPPED | OUTPATIENT
Start: 2021-05-25 | End: 2022-06-16

## 2021-05-25 NOTE — TELEPHONE ENCOUNTER
Medication:   Requested Prescriptions     Pending Prescriptions Disp Refills    allopurinol (ZYLOPRIM) 100 MG tablet [Pharmacy Med Name: ALLOPURINOL 100 MG Tablet] 90 tablet 0     Sig: TAKE 1 TABLET EVERY DAY (ALONG WITH 300MG TABLET)    torsemide (DEMADEX) 10 MG tablet [Pharmacy Med Name: TORSEMIDE 10 MG Tablet] 135 tablet 1     Sig: TAKE 1 TABLET EVERY OTHER DAY ALTERNATING WITH  2  TABLETS EVERY OTHER DAY      Last Filled:      Patient Phone Number: [phone] (home) [phone] (work)    Last appt: 4/7/2021   Next appt: Visit date not found    Last OARRS:   RX Monitoring 4/18/2019   Attestation The Prescription Monitoring Report for this patient was reviewed today.      PDMP Monitoring:    Last PDMP Leonides Bustillo as Reviewed [facility]):  Review User Review Instant Review Result   Ivette Larisa 9/18/2020  2:06 PM Reviewed PDMP [1]     Preferred Pharmacy:   [address] [place] [address] 569, [address] [facility] [phone] Dina Leslie [phone]  [facility] [address]. [address]  Phone: [phone] Fax: [phone]    [address] Mail Delivery - Suzie Gustavonayan [phone] - F [phone]  [address] [facility] [address]  Phone: [phone] Fax: [phone] [address] [facility] [address] Petr Kingsley [address] [place] [address] [phone] Dina Leslie [phone]  [address] [facility] [address]  Phone: [phone] Fax: [phone]

## 2021-05-26 ENCOUNTER — NURSE ONLY (OUTPATIENT)
Dept: CARDIOLOGY CLINIC | Age: 81
End: 2021-05-26
Payer: MEDICARE

## 2021-05-26 ENCOUNTER — TELEPHONE (OUTPATIENT)
Dept: FAMILY MEDICINE CLINIC | Age: 81
End: 2021-05-26

## 2021-05-26 ENCOUNTER — OFFICE VISIT (OUTPATIENT)
Dept: CARDIOLOGY CLINIC | Age: 81
End: 2021-05-26
Payer: MEDICARE

## 2021-05-26 VITALS
WEIGHT: 125.2 LBS | HEIGHT: 63 IN | OXYGEN SATURATION: 97 % | DIASTOLIC BLOOD PRESSURE: 58 MMHG | BODY MASS INDEX: 22.18 KG/M2 | SYSTOLIC BLOOD PRESSURE: 130 MMHG | HEART RATE: 71 BPM

## 2021-05-26 DIAGNOSIS — I49.5 SICK SINUS SYNDROME (HCC): ICD-10-CM

## 2021-05-26 DIAGNOSIS — I63.9 CEREBROVASCULAR ACCIDENT (CVA), UNSPECIFIED MECHANISM (HCC): ICD-10-CM

## 2021-05-26 DIAGNOSIS — Z95.0 PACEMAKER: ICD-10-CM

## 2021-05-26 DIAGNOSIS — I48.0 PAF (PAROXYSMAL ATRIAL FIBRILLATION) (HCC): Primary | ICD-10-CM

## 2021-05-26 DIAGNOSIS — I10 HTN (HYPERTENSION), BENIGN: ICD-10-CM

## 2021-05-26 DIAGNOSIS — I25.5 ISCHEMIC CARDIOMYOPATHY: ICD-10-CM

## 2021-05-26 DIAGNOSIS — R00.1 SYMPTOMATIC BRADYCARDIA: ICD-10-CM

## 2021-05-26 DIAGNOSIS — I48.0 PAF (PAROXYSMAL ATRIAL FIBRILLATION) (HCC): ICD-10-CM

## 2021-05-26 PROBLEM — R07.1 CHEST PAIN ON BREATHING: Status: RESOLVED | Noted: 2018-03-22 | Resolved: 2021-05-26

## 2021-05-26 PROCEDURE — 93280 PM DEVICE PROGR EVAL DUAL: CPT | Performed by: INTERNAL MEDICINE

## 2021-05-26 PROCEDURE — 4040F PNEUMOC VAC/ADMIN/RCVD: CPT | Performed by: NURSE PRACTITIONER

## 2021-05-26 PROCEDURE — 1090F PRES/ABSN URINE INCON ASSESS: CPT | Performed by: NURSE PRACTITIONER

## 2021-05-26 PROCEDURE — 1123F ACP DISCUSS/DSCN MKR DOCD: CPT | Performed by: NURSE PRACTITIONER

## 2021-05-26 PROCEDURE — 1036F TOBACCO NON-USER: CPT | Performed by: NURSE PRACTITIONER

## 2021-05-26 PROCEDURE — G8420 CALC BMI NORM PARAMETERS: HCPCS | Performed by: NURSE PRACTITIONER

## 2021-05-26 PROCEDURE — G8427 DOCREV CUR MEDS BY ELIG CLIN: HCPCS | Performed by: NURSE PRACTITIONER

## 2021-05-26 PROCEDURE — G8399 PT W/DXA RESULTS DOCUMENT: HCPCS | Performed by: NURSE PRACTITIONER

## 2021-05-26 PROCEDURE — 1111F DSCHRG MED/CURRENT MED MERGE: CPT | Performed by: NURSE PRACTITIONER

## 2021-05-26 PROCEDURE — 99214 OFFICE O/P EST MOD 30 MIN: CPT | Performed by: NURSE PRACTITIONER

## 2021-05-26 RX ORDER — ERGOCALCIFEROL 1.25 MG/1
50000 CAPSULE ORAL WEEKLY
Qty: 12 CAPSULE | Refills: 0 | Status: SHIPPED | OUTPATIENT
Start: 2021-05-26 | End: 2021-09-20 | Stop reason: SDUPTHER

## 2021-05-26 NOTE — PROGRESS NOTES
Patient comes in for programming evaluation for her pacemaker. All sensing and pacing parameters are within normal range. Battery life 15 years  AP 46%.  7%. AT/AF with a 21% burden. Last on 5/18/202, longest > 48 hours. VT episodes on 5/17/2021-Patient had watchman procedure. Patient remains on Xarelto and metoprolol. No changes need to be made at this time. Please see interrogation for more detail. Patient will see NPSR today and follow up in 3 months in office or remotely.

## 2021-05-26 NOTE — TELEPHONE ENCOUNTER
Medication:   Requested Prescriptions     Pending Prescriptions Disp Refills    vitamin D (ERGOCALCIFEROL) 1.25 MG (30768 UT) CAPS capsule 12 capsule 3     Sig: Take 1 capsule by mouth once a week      Last Filled:     Patient Phone Number: [phone] (home) [phone] (work)    Last appt: 4/7/2021   Next appt: 6/14/2021    Last OARRS:   RX Monitoring 4/18/2019   Attestation The Prescription Monitoring Report for this patient was reviewed today.      PDMP Monitoring:    Last PDMP Javon Pleasure as Reviewed [facility]):  Review User Review Instant Review Result   Diogo Posadas 9/18/2020  2:06 PM Reviewed PDMP [1]     Preferred Pharmacy:   [facility] [address] [facility] [phone] [facility] [phone]  [facility] [phone]. [phone]  Phone: [phone] Fax: [phone]    [facility] [place] [phone] - F [phone]  [address] [place] [address]  Phone: [phone] Fax: [phone]    [facility] [address] Petr Kingsely [address] [place] [address] [phone] [facility] [phone]  [address] [facility] [address]  Phone: [phone] Fax: [phone]

## 2021-05-26 NOTE — PATIENT INSTRUCTIONS
1. No changes  2. Complete DONA on July 7th at 1245.  Do not eat or drink that morning; do not take your medications that morning

## 2021-05-28 ENCOUNTER — CARE COORDINATION (OUTPATIENT)
Dept: CARE COORDINATION | Age: 81
End: 2021-05-28

## 2021-06-01 ENCOUNTER — OFFICE VISIT (OUTPATIENT)
Dept: FAMILY MEDICINE CLINIC | Age: 81
End: 2021-06-01
Payer: MEDICARE

## 2021-06-01 VITALS
TEMPERATURE: 97.1 F | HEART RATE: 70 BPM | BODY MASS INDEX: 22.57 KG/M2 | WEIGHT: 127.4 LBS | DIASTOLIC BLOOD PRESSURE: 62 MMHG | OXYGEN SATURATION: 98 % | SYSTOLIC BLOOD PRESSURE: 102 MMHG

## 2021-06-01 DIAGNOSIS — J30.1 ALLERGIC RHINITIS DUE TO POLLEN, UNSPECIFIED SEASONALITY: ICD-10-CM

## 2021-06-01 DIAGNOSIS — M15.9 PRIMARY OSTEOARTHRITIS INVOLVING MULTIPLE JOINTS: ICD-10-CM

## 2021-06-01 DIAGNOSIS — M70.51 PES ANSERINUS BURSITIS OF RIGHT KNEE: Primary | ICD-10-CM

## 2021-06-01 PROCEDURE — 20610 DRAIN/INJ JOINT/BURSA W/O US: CPT | Performed by: FAMILY MEDICINE

## 2021-06-01 PROCEDURE — G8399 PT W/DXA RESULTS DOCUMENT: HCPCS | Performed by: FAMILY MEDICINE

## 2021-06-01 PROCEDURE — 1036F TOBACCO NON-USER: CPT | Performed by: FAMILY MEDICINE

## 2021-06-01 PROCEDURE — 4040F PNEUMOC VAC/ADMIN/RCVD: CPT | Performed by: FAMILY MEDICINE

## 2021-06-01 PROCEDURE — 1090F PRES/ABSN URINE INCON ASSESS: CPT | Performed by: FAMILY MEDICINE

## 2021-06-01 PROCEDURE — 1123F ACP DISCUSS/DSCN MKR DOCD: CPT | Performed by: FAMILY MEDICINE

## 2021-06-01 PROCEDURE — G8420 CALC BMI NORM PARAMETERS: HCPCS | Performed by: FAMILY MEDICINE

## 2021-06-01 PROCEDURE — 1111F DSCHRG MED/CURRENT MED MERGE: CPT | Performed by: FAMILY MEDICINE

## 2021-06-01 PROCEDURE — G8428 CUR MEDS NOT DOCUMENT: HCPCS | Performed by: FAMILY MEDICINE

## 2021-06-01 PROCEDURE — 99213 OFFICE O/P EST LOW 20 MIN: CPT | Performed by: FAMILY MEDICINE

## 2021-06-01 RX ORDER — IPRATROPIUM BROMIDE 42 UG/1
1 SPRAY, METERED NASAL 4 TIMES DAILY
Qty: 1 BOTTLE | Refills: 3 | Status: SHIPPED | OUTPATIENT
Start: 2021-06-01 | End: 2021-11-02

## 2021-06-01 RX ORDER — HYDROCODONE BITARTRATE AND ACETAMINOPHEN 5; 325 MG/1; MG/1
1 TABLET ORAL 4 TIMES DAILY PRN
Qty: 120 TABLET | Refills: 0 | Status: SHIPPED | OUTPATIENT
Start: 2021-06-01 | End: 2021-09-03 | Stop reason: SDUPTHER

## 2021-06-01 RX ORDER — TRIAMCINOLONE ACETONIDE 40 MG/ML
40 INJECTION, SUSPENSION INTRA-ARTICULAR; INTRAMUSCULAR ONCE
Status: COMPLETED | OUTPATIENT
Start: 2021-06-01 | End: 2021-06-01

## 2021-06-01 RX ADMIN — TRIAMCINOLONE ACETONIDE 40 MG: 40 INJECTION, SUSPENSION INTRA-ARTICULAR; INTRAMUSCULAR at 16:29

## 2021-06-01 NOTE — PROGRESS NOTES
Arthrocentesis Procedure Note    Pre-operative Diagnosis:   1. Pes anserinus bursitis of right knee    2. Primary osteoarthritis involving multiple joints    3. Allergic rhinitis due to pollen, unspecified seasonality        Post-operative Diagnosis: same    Locations:right knee    Procedure Details   After consent was obtained, using sterile technique the pes bursa of the   right knee was prepped and surrounding area with a sterile prep using chloraprep and draped in the usual sterile fashion. .  Kenalog  40 mg and 1 ml 0.5% Marcaine was then injected and the needle withdrawn. The procedure was well tolerated. Sterile dressing applied to site. Complications: none. Plan:  1. The patient was instructed to continue to rest the joint for a few more days before resuming regular activities. It may be more painful for the first 1-2 days. 2. Watch for fever, or increased swelling or persistent pain in the joint. 3. Call or return to clinic prn if such symptoms occur or there is failure to improve as anticipated. 4. Recommended that the patient use OTC analgesics as needed for pain.

## 2021-06-01 NOTE — PATIENT INSTRUCTIONS
Patient Education        Bursitis: Care Instructions  Your Care Instructions     A bursa is a small sac of fluid that helps the tissues around a joint slide over one another easily. Injury or overuse of a joint can cause pain, redness, and inflammation in the bursa (bursitis). Bursitis usually gets better if you avoid the activity that caused it. You can help prevent bursitis from coming back by doing stretching and strengthening exercises. You may also need to change the way you do some activities. Follow-up care is a key part of your treatment and safety. Be sure to make and go to all appointments, and call your doctor if you are having problems. It's also a good idea to know your test results and keep a list of the medicines you take. How can you care for yourself at home? · Put ice or a cold pack on the area for 10 to 20 minutes at a time. Try to do this every 1 to 2 hours for the next 3 days (when you are awake) or until the swelling goes down. Put a thin cloth between the ice and your skin. · After the 3 days of using ice, you may use heat on the area. You can use a hot water bottle; a warm, moist towel; or a heating pad set on low. You can also try alternating heat and ice. · Rest the area where you have pain. Stop any activities that cause pain. Switch to activities that do not stress the area. · Take pain medicines exactly as directed. ? If the doctor gave you a prescription medicine for pain, take it as prescribed. ? If you are not taking a prescription pain medicine, ask your doctor if you can take an over-the-counter medicine. ? Do not take two or more pain medicines at the same time unless the doctor told you to. Many pain medicines have acetaminophen, which is Tylenol. Too much acetaminophen (Tylenol) can be harmful. · To prevent stiffness, gently move the joint as much as you can without pain every day. As the pain gets better, keep doing range-of-motion exercises.  Ask your doctor for exercises that will make the muscles around the joint stronger. Do these as directed. · You can slowly return to the activity that caused the pain, but do it with less effort until you can do it without pain or swelling. Be sure to warm up before and stretch after you do the activity. When should you call for help? Call your doctor now or seek immediate medical care if:    · You have new or worse symptoms of infection, such as:  ? Increased pain, swelling, warmth, or redness. ? Red streaks leading from the area. ? Pus draining from the area. ? A fever. Watch closely for changes in your health, and be sure to contact your doctor if:    · You do not get better as expected. Where can you learn more? Go to https://[email or web address]. org and sign in to your [employer]od[employer] account. Enter A085 in the Streyner box to learn more about \"Bursitis: Care Instructions. \"     If you do not have an account, please click on the \"Sign Up Now\" link. Current as of: November 16, 2020               Content Version: 12.8  © 2006-2021 [employer]wise[employer]. Care instructions adapted under license by [facility] ([facility]). If you have questions about a medical condition or this instruction, always ask your healthcare professional. [address] any warranty or liability for your use of this information.

## 2021-06-01 NOTE — PROGRESS NOTES
Subjective:      Patient ID: Terese Horowitz is a [de-identified] y.o. female. CC: Patient presents for acute medical problem-right knee pain and right hip pain and also persistent nasal drainage. Medical assistant notes reviewed. HPI Patient presents with right knee and right hip pain which seems to be getting worse the past 2 weeks. She states her knee has been giving out on her. Patient issues with the right knee last November and had a cortisone injection of the pes bursa with good improvement just until recently. She has had no trauma or falls. She also is complaining of persistent runny nose that becomes worse with eating. Patient also request a refill of pain pills with her last prescription in April. Review of Systems     Allergies   Allergen Reactions    Aspirin Nausea Only    Diltiazem Anaphylaxis    Diltiazem Hcl Anaphylaxis    Lorazepam Other (See Comments)     hallucinations  hallucinations  hallucinations    Sulfa Antibiotics Rash and Hives    Atorvastatin Other (See Comments)     Muscle pains  Muscle pains  Muscle pains    Dabigatran Nausea Only     And indigestion  And indigestion    Aka Pradaxa  And indigestion  And indigestion  And indigestion    Aka Pradaxa  And indigestion  And indigestion  And indigestion    Aka Pradaxa    Mysoline [Primidone]     Nsaids      Other reaction(s): Unknown    Other Other (See Comments)     Nitroglycerin patches causes severe headaches    Primidone      Other reaction(s): Unknown       Objective:   Physical Exam  Vitals reviewed. Constitutional:       General: She is not in acute distress. Appearance: She is well-developed. HENT:      Right Ear: Tympanic membrane normal.      Left Ear: Tympanic membrane normal.      Nose: Mucosal edema, congestion and rhinorrhea present. Right Sinus: No maxillary sinus tenderness or frontal sinus tenderness. Left Sinus: No maxillary sinus tenderness or frontal sinus tenderness.    Pulmonary:      Effort: occurred.

## 2021-06-04 RX ORDER — METOPROLOL SUCCINATE 25 MG/1
TABLET, EXTENDED RELEASE ORAL
Qty: 180 TABLET | Refills: 0 | Status: SHIPPED | OUTPATIENT
Start: 2021-06-04 | End: 2021-10-21 | Stop reason: SDUPTHER

## 2021-06-04 RX ORDER — TOPIRAMATE 50 MG/1
TABLET, FILM COATED ORAL
Qty: 360 TABLET | Refills: 0 | Status: SHIPPED | OUTPATIENT
Start: 2021-06-04 | End: 2021-11-29

## 2021-06-04 NOTE — TELEPHONE ENCOUNTER
Medication:   Requested Prescriptions     Pending Prescriptions Disp Refills    metoprolol succinate (TOPROL XL) 25 MG extended release tablet [Pharmacy Med Name: METOPROLOL SUCCINATE ER 25 MG Tablet Extended Release 24 Hour] 180 tablet      Sig: TAKE 1 TABLET TWICE DAILY    topiramate (TOPAMAX) 50 MG tablet [Pharmacy Med Name: TOPIRAMATE 50 MG Tablet] 360 tablet 0     Sig: TAKE 2 TABLETS TWICE DAILY      Last Filled:      Patient Phone Number: [phone] (home) [phone] (work)    Last appt:   Next appt: 6/14/2021    Last OARRS:   RX Monitoring 6/1/2021   Attestation -   Periodic Controlled Substance Monitoring Possible medication side effects, risk of tolerance/dependence & alternative treatments discussed.      PDMP Monitoring:    Last PDMP [facility] as Reviewed [facility]):  Review User Review Instant Review Result   Sade Cobian 6/1/2021  4:31 PM Reviewed PDMP [1]     Preferred Pharmacy:   [address] [place] [address], [address] [facility] [phone] Kiley Lemon [phone]  [facility] [address]. [phone]  Phone: [phone] Fax: [phone]    [facility] [place] [phone] - F [phone]  [address] [place] [address]  Phone: [phone] Fax: [phone] [address] [facility] [address] Petr Kingsley [address] [place] [address] [phone] Kiley Lemon [phone]  [address] [facility] [address]  Phone: [phone] Fax: [phone]

## 2021-06-15 ENCOUNTER — CARE COORDINATION (OUTPATIENT)
Dept: CASE MANAGEMENT | Age: 81
End: 2021-06-15

## 2021-06-15 NOTE — CARE COORDINATION
[facility] Transitions Follow Up Call    6/15/2021    Patient: Gloria Mccarthy  Patient : 1940   MRN: <[id number]>  Reason for Admission: Watchman placement  Discharge Date: 21 RARS: Readmission Risk Score: 19    [address] 2nd attempted outreach. Left message and contact information for call back. No further outreach will be done.     Charlene Tenorio LPN  Care Coordinator

## 2021-06-17 ENCOUNTER — TELEPHONE (OUTPATIENT)
Dept: CARDIAC CATH/INVASIVE PROCEDURES | Age: 81
End: 2021-06-17

## 2021-06-28 ENCOUNTER — TELEPHONE (OUTPATIENT)
Dept: FAMILY MEDICINE CLINIC | Age: 81
End: 2021-06-28

## 2021-06-28 NOTE — TELEPHONE ENCOUNTER
----- Message from Santhosh Sesay sent at 6/26/2021  2:38 PM EDT -----  Subject: Appointment Request    Reason for Call: Routine (Patient Request) No Script    QUESTIONS  Type of Appointment? Established Patient  Reason for appointment request? Available appointments did not meet   patient need  Additional Information for Provider? Pt is having left hip pain and needs   to be seen, screened green. ---------------------------------------------------------------------------  --------------  Bernice MIRAMONTES  What is the best way for the office to contact you? OK to leave message on   voicemail  Preferred Call Back Phone Number? [phone]  ---------------------------------------------------------------------------  --------------  SCRIPT ANSWERS  Relationship to Patient? Self  Appointment reason? Symptomatic  Select script based on patient symptoms? Adult No Script  (Is the patient requesting to see the provider for a procedure?)? No  (Is the patient requesting to see the provider urgently  today or   tomorrow. )? No  Have you been diagnosed with, awaiting test results for, or told that you   are suspected of having COVID-19 (Coronavirus)? (If patient has tested   negative or was tested as a requirement for work, school, or travel and   not based on symptoms, answer no)? No  Do you currently have flu-like symptoms including fever or chills, cough,   shortness of breath, difficulty breathing, or new loss of taste or smell? No  Have you had close contact with someone with COVID-19 in the last 14 days? No  (Service Expert  click yes below to proceed with [email or web address] As Usual   Scheduling)?  Yes

## 2021-06-29 ENCOUNTER — OFFICE VISIT (OUTPATIENT)
Dept: FAMILY MEDICINE CLINIC | Age: 81
End: 2021-06-29
Payer: MEDICARE

## 2021-06-29 VITALS
RESPIRATION RATE: 12 BRPM | TEMPERATURE: 97.2 F | OXYGEN SATURATION: 98 % | BODY MASS INDEX: 23.56 KG/M2 | SYSTOLIC BLOOD PRESSURE: 110 MMHG | HEART RATE: 70 BPM | WEIGHT: 133 LBS | DIASTOLIC BLOOD PRESSURE: 68 MMHG

## 2021-06-29 DIAGNOSIS — B35.3 TINEA PEDIS OF BOTH FEET: Primary | ICD-10-CM

## 2021-06-29 DIAGNOSIS — M16.11 PRIMARY OSTEOARTHRITIS OF RIGHT HIP: ICD-10-CM

## 2021-06-29 DIAGNOSIS — L30.2 ID REACTION: ICD-10-CM

## 2021-06-29 PROCEDURE — 99214 OFFICE O/P EST MOD 30 MIN: CPT | Performed by: FAMILY MEDICINE

## 2021-06-29 PROCEDURE — 1123F ACP DISCUSS/DSCN MKR DOCD: CPT | Performed by: FAMILY MEDICINE

## 2021-06-29 PROCEDURE — G8428 CUR MEDS NOT DOCUMENT: HCPCS | Performed by: FAMILY MEDICINE

## 2021-06-29 PROCEDURE — 1036F TOBACCO NON-USER: CPT | Performed by: FAMILY MEDICINE

## 2021-06-29 PROCEDURE — 1090F PRES/ABSN URINE INCON ASSESS: CPT | Performed by: FAMILY MEDICINE

## 2021-06-29 PROCEDURE — G8420 CALC BMI NORM PARAMETERS: HCPCS | Performed by: FAMILY MEDICINE

## 2021-06-29 PROCEDURE — G8399 PT W/DXA RESULTS DOCUMENT: HCPCS | Performed by: FAMILY MEDICINE

## 2021-06-29 PROCEDURE — 4040F PNEUMOC VAC/ADMIN/RCVD: CPT | Performed by: FAMILY MEDICINE

## 2021-06-29 RX ORDER — TERBINAFINE HYDROCHLORIDE 250 MG/1
250 TABLET ORAL DAILY
Qty: 14 TABLET | Refills: 0 | Status: SHIPPED | OUTPATIENT
Start: 2021-06-29 | End: 2021-07-13

## 2021-06-29 NOTE — PROGRESS NOTES
Subjective:      Patient ID: Edinson Olivas is a [de-identified] y.o. female. CC: Patient presents for acute medical problem-right hip pain, rash and feet problems. . Medical assistant notes reviewed. Hip Pain   Incident onset: a month  There was no injury mechanism. The pain is present in the right hip. The quality of the pain is described as shooting, stabbing, cramping and burning. The pain is at a severity of 10/10. The pain is severe. The pain has been fluctuating since onset. Associated symptoms include an inability to bear weight. She reports no foreign bodies present. She has tried acetaminophen for the symptoms. The treatment provided no relief. Patient denies any falls associate with the hip pain. She is limited on medication she takes as she is on anticoagulant medications. She is currently starting to itch everywhere. Specially in the lower portion of her back. She also has a rash on her feet. Review of Systems  Allergies   Allergen Reactions    Aspirin Nausea Only    Diltiazem Anaphylaxis    Diltiazem Hcl Anaphylaxis    Lorazepam Other (See Comments)     hallucinations  hallucinations  hallucinations    Sulfa Antibiotics Rash and Hives    Atorvastatin Other (See Comments)     Muscle pains  Muscle pains  Muscle pains    Dabigatran Nausea Only     And indigestion  And indigestion    Aka Pradaxa  And indigestion  And indigestion  And indigestion    Aka Pradaxa  And indigestion  And indigestion  And indigestion    Aka Pradaxa    Mysoline [Primidone]     Nsaids      Other reaction(s): Unknown    Other Other (See Comments)     Nitroglycerin patches causes severe headaches    Primidone      Other reaction(s): Unknown     Objective:   Physical Exam  Vitals and nursing note reviewed. Constitutional:       General: She is not in acute distress. Appearance: She is well-developed. Musculoskeletal:      Lumbar back: No deformity, spasms or tenderness. Decreased range of motion.       Right hip: No tenderness. Normal range of motion. Normal strength. Skin:     General: Skin is warm. Findings: Rash present. Comments: Significant tinea pedis noted on both feet right greater than left. Excoriated areas noted on her torso and significant seborrheic dermatitis noted on her back. Neurological:      Mental Status: She is alert. Psychiatric:         Behavior: Behavior is cooperative. Assessment:      Sheila Liu was seen today for hip pain. Diagnoses and all orders for this visit:    Tinea pedis of both feet    Primary osteoarthritis of right hip    Id reaction    Other orders  -     terbinafine (LAMISIL) 250 MG tablet; Take 1 tablet by mouth daily for 14 days  -     betamethasone valerate (VALISONE) 0.1 % cream; Apply topically 2 times daily. Plan:      Begin antifungal treatment for the id reaction. She may continue with over-the-counter Benadryl or Zyrtec for the itching. Start betamethasone to the back seborrheic dermatitis. For the osteoarthritis of the right hip she has limited on what she can take. I recommend she start taking Tylenol 1000 mg up to 3 times a day. If she fails this therapy the next that would be prednisone medication. RTC as needed    Medical decision making of moderate complexity    Please note that this chart was generated using Dragon dictation software. Although every effort was made to ensure the accuracy of this automated transcription, some errors in transcription may have occurred.

## 2021-07-07 ENCOUNTER — HOSPITAL ENCOUNTER (OUTPATIENT)
Dept: CARDIAC CATH/INVASIVE PROCEDURES | Age: 81
Discharge: HOME | End: 2021-07-07
Attending: INTERNAL MEDICINE | Admitting: INTERNAL MEDICINE
Payer: MEDICARE

## 2021-07-07 VITALS
TEMPERATURE: 98 F | HEART RATE: 70 BPM | DIASTOLIC BLOOD PRESSURE: 71 MMHG | SYSTOLIC BLOOD PRESSURE: 145 MMHG | RESPIRATION RATE: 18 BRPM

## 2021-07-07 LAB
LV EF: 48 %
LVEF MODALITY: NORMAL

## 2021-07-07 PROCEDURE — 93315 ECHO TRANSESOPHAGEAL: CPT | Performed by: INTERNAL MEDICINE

## 2021-07-07 PROCEDURE — 7100000010 HC PHASE II RECOVERY - FIRST 15 MIN

## 2021-07-07 PROCEDURE — 93320 DOPPLER ECHO COMPLETE: CPT

## 2021-07-07 PROCEDURE — 93312 ECHO TRANSESOPHAGEAL: CPT

## 2021-07-07 PROCEDURE — 99152 MOD SED SAME PHYS/QHP 5/>YRS: CPT | Performed by: INTERNAL MEDICINE

## 2021-07-07 PROCEDURE — 93320 DOPPLER ECHO COMPLETE: CPT | Performed by: INTERNAL MEDICINE

## 2021-07-07 PROCEDURE — 93325 DOPPLER ECHO COLOR FLOW MAPG: CPT

## 2021-07-07 PROCEDURE — 93325 DOPPLER ECHO COLOR FLOW MAPG: CPT | Performed by: INTERNAL MEDICINE

## 2021-07-07 PROCEDURE — 99152 MOD SED SAME PHYS/QHP 5/>YRS: CPT

## 2021-07-07 PROCEDURE — 99153 MOD SED SAME PHYS/QHP EA: CPT

## 2021-07-07 RX ORDER — CLOPIDOGREL BISULFATE 75 MG/1
75 TABLET ORAL DAILY
Qty: 90 TABLET | Refills: 3 | Status: ON HOLD
Start: 2021-07-07 | End: 2022-01-12 | Stop reason: HOSPADM

## 2021-07-07 NOTE — H&P
aspirin EC 81 MG EC tablet Take 1 tablet by mouth daily Yes Laura Oseguera APRN - CNP   rivaroxaban (XARELTO) 15 MG TABS tablet Take 1 tablet by mouth daily (with breakfast) Yes TIFFANIE Laguna CNP   levothyroxine (SYNTHROID) 112 MCG tablet TAKE 1 TABLET EVERY DAY Yes Tremaine Solis MD   lidocaine (LIDODERM) 5 % Place 1 patch onto the skin daily 12 hours on, 12 hours off.  Yes Win Magana PA-C   topiramate (TOPAMAX) 50 MG tablet TAKE 2 TABLETS TWICE DAILY Yes Tremaine Solis MD   nitroGLYCERIN (NITROLINGUAL) 0.4 MG/SPRAY 0.4 mg spray Place 1 spray under the tongue every 5 minutes as needed for Chest pain Yes Yadira Gillis MD   metoprolol succinate (TOPROL XL) 25 MG extended release tablet Take 1 tablet by mouth 2 times daily Yes Tremaine Solis MD   vitamin D (ERGOCALCIFEROL) 1.25 MG (35407 UT) CAPS capsule Take 1 capsule by mouth once a week Yes Tremaine Solis MD      Past Medical History:  Past Medical History        Past Medical History:   Diagnosis Date    Allergic rhinitis, cause unspecified      Arthritis      Atrial fibrillation ([address]      Bronchopneumonia      CAD (coronary artery disease)       stent:  post cataract surgery (CABG)    Cerebral artery occlusion with cerebral infarction (HCC)       TIA    Chronic gouty arthropathy      Chronic kidney disease      Congestive heart failure, unspecified      Degeneration of cervical intervertebral disc      Essential and other specified forms of tremor      Gout      HIGH CHOLESTEROL      History of CVA (cerebrovascular accident)      Hx of blood clots      Hypertension      Influenza 12/23/2017    Mitral valve stenosis and aortic valve insufficiency      Movement disorder       back problems    Pacemaker      Peptic ulcer, unspecified site, unspecified as acute or chronic, without mention of hemorrhage, perforation, or obstruction      Thyroid disease      Unspecified disorder of kidney and ureter      Unspecified hypothyroidism           Past Surgical History:    has a past surgical history that includes back surgery; Cholecystectomy; Cardiac surgery; Coronary artery bypass graft (1987); shoulder surgery; Cardiac catheterization (7/2012); hip surgery (Left, 03/16/2017); joint replacement; Cardiac catheterization (08/07/2018); Percutaneous Transluminal Coronary Angio (11/04/2014); pr njx aa&/strd tfrml epi lumbar/sacral 1 level (Right, 12/3/2018); Femoral-femoral Bypass Graft (N/A, 8/22/2019); femoral bypass (Left, 8/22/2019); and IR KYPHOPLASTY LUMBAR 1 VERTEBRAL BODY (5/14/2021).    Social History:  Reviewed. reports that she quit smoking about 34 years ago. Her smoking use included cigarettes. She has a 28.00 pack-year smoking history. She has never used smokeless tobacco. She reports previous alcohol use. She reports that she does not use drugs.      Family History:  Reviewed. family history includes Cancer in her maternal grandmother, paternal grandmother, and sister; Diabetes in her brother and maternal uncle; Heart Disease in her brother, maternal aunt, and sister; Hypertension in her brother and paternal aunt; Stroke in her maternal aunt.      Review of System:  · Constitutional: Negative for fever, night sweats, chills, weight changes, or weakness  · Skin: Negative for rash, dry skin, pruritus, bruising, bleeding, blood clots, or changes in skin pigment  · HEENT: Negative for vision changes, ringing in the ears, sore throat, dysphagia, or swollen lymph nodes  · Respiratory: Reviewed in HPI  · Cardiovascular: Reviewed in HPI  · Gastrointestinal: Negative for abdominal pain, N/V/D, constipation, or black/tarry stools  · Genito-Urinary: Negative for dysuria, incontinence, urgency, or hematuria  · Musculoskeletal: Negative for joint swelling, muscle pain, or injuries  · Neurological/Psych: Negative for confusion, seizures, dizziness, headaches, balance issues or TIA-like symptoms.  No anxiety, depression, or insomnia     Physical Examination:      Vitals:     05/26/21 1126   BP: (!) 130/58   Pulse: 71   SpO2: 97%          Wt Readings from Last 3 Encounters:   05/26/21 125 lb 3.2 oz (56.8 kg)   05/19/21 125 lb 3.5 oz (56.8 kg)   05/14/21 125 lb (56.7 kg)      Constitutional: Cooperative and in no apparent distress, and appears stated age  Skin: Warm and pink; no pallor, cyanosis, bruising, or clubbing  HEENT: Symmetric and normocephalic. PERRL, EOM intact. Conjunctiva pink with clear sclera. Mucus membranes pink and moist. Teeth intact. Thyroid smooth without nodules or goiter  Respiratory: Respirations symmetric and unlabored. Lungs clear to auscultation bilaterally, no wheezing, rhonchi, or crackles  Cardiovascular:  Regular rate and rhythm. S1/S2 present without murmurs, rubs, or gallops. Peripheral pulses 2+, capillary refill < 3 seconds. No elevation of JVP. No peripheral edema  Gastrointestinal: Abdomen soft and round. Bowel sounds normoactive in all quadrants without tenderness or masses. Musculoskeletal: Bilateral upper and lower extremity strength 5/5 with full ROM. Neurological/Psych: Awake and orientated to person, place and time. Calm affect, appropriate mood.      Pertinent labs, diagnostic, device, and imaging results reviewed as a part of this visit     LABS     CBC:         Lab Results   Component Value Date     WBC 17.6 (H) 05/19/2021     HGB 13.4 05/19/2021     HCT 42.5 05/19/2021     MCV 84.2 05/19/2021      05/19/2021      BMP:         Lab Results   Component Value Date     CREATININE 1.6 (H) 05/19/2021     BUN 45 (H) 05/19/2021      05/19/2021     K 4.4 05/19/2021      05/19/2021     CO2 23 05/19/2021      Estimated Creatinine Clearance: 23 mL/min (A) (based on SCr of 1.6 mg/dL (H)).       Thyroid:         Lab Results   Component Value Date     TSH 0.90 03/25/2021     [id number] 7.3 10/03/2018      Lipid Panel:         Lab Results   Component Value Date     CHOL 120 03/26/2020     HDL 29 2020     HDL 31 2012     TRIG 97 2020      LFTs:        Lab Results   Component Value Date     ALT 11 2020     AST 18 2020     ALKPHOS 80 2020     BILITOT 0.5 2020      Coags:         Lab Results   Component Value Date     PROTIME 11.7 2021     INR 1.01 2021     APTT 30.8 2021         EC2021  - NSR with PVC     Echo:   Left ventricle - normal size, thickness, reduced function with EF of 45%  LV wall motion - diffuse hypokinesis. Mitral valve - thickened, annular calcification, moderate regurgitation  Aortic valve - thickened, calcified, mild regurgitation  Tricuspid valve - mild regurgitation with PASP of 25mmHg  Pulmonic valve - trivial regurgitation  Biatrial enlargement  Pacemaker / ICD lead is visualized in the right heart.     DONA:   Ofelia Mimes is no evidence of pericardial effusion at the start of the procedure.   The left atrial appendage appears free of thrombus. Left atrial appendage ostial measurements are as follows   46 degrees 1.99cm   136 degrees 2.01cm   140 degrees 2.27cm   A 27mm Watchman device is successfully deployed into the left atrial appendage. The device appears adequately compressed. There is no evidence of leak by color Doppler.  Ofelia Mimes is no evidence of effusion upon completion of the procedure.     GXT:    Normal rest myocardial perfusion.    No fixed defect to suggest scar.    Normal perfusion c/w normal viability.      Assessment:     1. Paroxysmal Atrial Fibrillation  - S/p Watchman Implantation (21)     - Currently in NSR with PVC  - Continue Toprol XL 25 mg BID  - [id number] score:high; [id number] Vasc score and anticoagulation discussed. High risk for stroke and thromboembolism. Anticoagulation is recommended.  Risk of bleeding was discussed.  ~ On Xarelto 15 mg QD; will stop and switch to ASA/plavix following post Watchman DONA     - Afib risk factors including age, HTN, obesity, inactivity and UVALDO were discussed with patient. Risk factor modification recommended                            -   follow up DONA  to assess for feliciano-device leak, R/B/A discussed     2. Sick Sinus Syndrome  - S/p Dual chamber [employer] PPM (11/13); s/p gen change (1/21)  - I reviewed device interrogation today. Device functioning normally.   ~ A-paced 46% V-paced 7%  ~ 15 years left on battery life expectancy  - Discussed with patient  - Follow up with device clinic as scheduled     3. Chronic systolic heart failure (NYHA Class III)  - Appears compensated              ~ EF 45% per echo  - Continue with Toprol XL 25 mg BID, torsemide 10 mg/20 mg daily alternating  ~ ACE-I/ARB contraindicated due to renal insufficiency and risk of hyperkalemia  - Aggressive medical therapy with risk factor modification  - Discussed with patient importance of monitoring weight, low sodium diet and fluid restriction  - Followed by CHF team     4. CAD  - Hx of CABG  - Stable  - No complaints of angina  - Continue ASA, BB, and statin              ~ No ASA due to allergy     5.  HTN  - Controlled: Goal <130/80  - Continue current medications  - Encouraged to monitor and log BP readings at home, then bring log to next visit  - Discussed importance of low sodium diet, weight control and exercise     Plan:   DONA

## 2021-07-07 NOTE — PROCEDURES
[facility]     Electrophysiology Procedure Note       Date of Procedure: 7/7/2021  Patient's Name: Chemo Cohen  YOB: 1940   Medical Record Number: [id number]  Procedure Performed by: Genesis Brennan MD     Procedures performed:  IV sedation. Trans-esophageal echocardiography     [id number]  ASA 3     Indication of the procedure: Watchman  Details of procedure: The patient was brought to the cath lab area in a fasting and non-sedated state. The risks, benefits and alternatives of the procedure were discussed with the patient. The patient opted to proceed with the procedure. Written informed consent was signed and placed in the chart. A timeout protocol was completed to identify the patient and the procedure being performed. IV sedation was provided with IV Versed, Fentanyl initially and DONA was performed   Full DONA reports will be dictated.      An independent trained observer pushed medications  at my direction. We monitored the patient's level of consciousness and vital signs/physiologic status throughout the procedure duration (see start and stop times below). Sedation:  2 mg Versed, 50 mcg Fentanyl   Sedation start: 1200  Sedation stop: 1230         The patient tolerated the procedure well and there were no complications.      Conclusion:   Watchman in place. No   Effusion. Less than 3 mm leak     Plan:   The patient can be discharged if remains stable.  Will continue with medical therapy.    can be off anticoagulation

## 2021-07-08 ENCOUNTER — OFFICE VISIT (OUTPATIENT)
Dept: CARDIOLOGY CLINIC | Age: 81
End: 2021-07-08
Payer: MEDICARE

## 2021-07-08 VITALS
HEART RATE: 70 BPM | BODY MASS INDEX: 22.68 KG/M2 | OXYGEN SATURATION: 95 % | HEIGHT: 63 IN | SYSTOLIC BLOOD PRESSURE: 120 MMHG | WEIGHT: 128 LBS | DIASTOLIC BLOOD PRESSURE: 60 MMHG

## 2021-07-08 DIAGNOSIS — I48.20 CHRONIC ATRIAL FIBRILLATION (HCC): ICD-10-CM

## 2021-07-08 DIAGNOSIS — N28.9 RENAL INSUFFICIENCY: ICD-10-CM

## 2021-07-08 DIAGNOSIS — I50.22 CHRONIC SYSTOLIC CONGESTIVE HEART FAILURE (HCC): Primary | ICD-10-CM

## 2021-07-08 DIAGNOSIS — I25.810 CORONARY ARTERY DISEASE INVOLVING AUTOLOGOUS ARTERY CORONARY BYPASS GRAFT WITHOUT ANGINA PECTORIS: ICD-10-CM

## 2021-07-08 PROCEDURE — 1036F TOBACCO NON-USER: CPT | Performed by: CLINICAL NURSE SPECIALIST

## 2021-07-08 PROCEDURE — 1090F PRES/ABSN URINE INCON ASSESS: CPT | Performed by: CLINICAL NURSE SPECIALIST

## 2021-07-08 PROCEDURE — 4040F PNEUMOC VAC/ADMIN/RCVD: CPT | Performed by: CLINICAL NURSE SPECIALIST

## 2021-07-08 PROCEDURE — 1123F ACP DISCUSS/DSCN MKR DOCD: CPT | Performed by: CLINICAL NURSE SPECIALIST

## 2021-07-08 PROCEDURE — 99214 OFFICE O/P EST MOD 30 MIN: CPT | Performed by: CLINICAL NURSE SPECIALIST

## 2021-07-08 PROCEDURE — G8427 DOCREV CUR MEDS BY ELIG CLIN: HCPCS | Performed by: CLINICAL NURSE SPECIALIST

## 2021-07-08 PROCEDURE — G8420 CALC BMI NORM PARAMETERS: HCPCS | Performed by: CLINICAL NURSE SPECIALIST

## 2021-07-08 PROCEDURE — G8399 PT W/DXA RESULTS DOCUMENT: HCPCS | Performed by: CLINICAL NURSE SPECIALIST

## 2021-07-08 NOTE — PROGRESS NOTES
Cardiac surgery; Coronary artery bypass graft (1987); shoulder surgery; Cardiac catheterization (7/2012); hip surgery (Left, 03/16/2017); joint replacement; Cardiac catheterization (08/07/2018); Percutaneous Transluminal Coronary Angio (11/04/2014); pr njx aa&/strd tfrml epi lumbar/sacral 1 level (Right, 12/3/2018); Femoral-femoral Bypass Graft (N/A, 8/22/2019); femoral bypass (Left, 8/22/2019); and IR KYPHOPLASTY LUMBAR 1 VERTEBRAL BODY (5/14/2021). Social History:   reports that she quit smoking about 34 years ago. Her smoking use included cigarettes. She has a 28.00 pack-year smoking history. She has never used smokeless tobacco. She reports previous alcohol use. She reports that she does not use drugs. Family History:   Family History   Problem Relation Age of Onset    Cancer Sister     Heart Disease Sister     Diabetes Brother     Hypertension Brother     Heart Disease Brother     Stroke Maternal Aunt     Heart Disease Maternal Aunt     Diabetes Maternal Uncle     Hypertension Paternal Aunt     Cancer Maternal Grandmother     Cancer Paternal Grandmother     Rheum Arthritis Neg Hx     Lupus Neg Hx        Home Medications:  Prior to Admission medications    Medication Sig Start Date End Date Taking? Authorizing Provider   clopidogrel (PLAVIX) 75 MG tablet Take 1 tablet by mouth daily 7/7/21   Saul PresMD jessi   terbinafine (LAMISIL) 250 MG tablet Take 1 tablet by mouth daily for 14 days 6/29/21 7/13/21  Zaki Phelps MD   betamethasone valerate (VALISONE) 0.1 % cream Apply topically 2 times daily.  6/29/21   Zaki Phelps MD   metoprolol succinate (TOPROL XL) 25 MG extended release tablet TAKE 1 TABLET TWICE DAILY 6/4/21   Zaki Phelps MD   topiramate (TOPAMAX) 50 MG tablet TAKE 2 TABLETS TWICE DAILY 6/4/21   Zaki Phelps MD   ipratropium (ATROVENT) 0.06 % nasal spray 1 spray by Each Nostril route 4 times daily 6/1/21   Zaki Phelps MD   vitamin D (ERGOCALCIFEROL) 1.25 MG (99943 UT) CAPS capsule Take 1 capsule by mouth once a week 5/26/21   Daxa Florence MD   allopurinol (ZYLOPRIM) 100 MG tablet TAKE 1 TABLET EVERY DAY (ALONG WITH 300MG TABLET) 5/25/21   Daxa Florence MD   torsemide (DEMADEX) 10 MG tablet TAKE 1 TABLET EVERY OTHER DAY ALTERNATING WITH  2  TABLETS EVERY OTHER DAY 5/25/21   Daxa Florence MD   aspirin EC 81 MG EC tablet Take 1 tablet by mouth daily 5/17/21   TIFFANIE Parish CNP   rivaroxaban (XARELTO) 15 MG TABS tablet Take 1 tablet by mouth daily (with breakfast) 5/14/21   TIFFANIE Varma - CNP   levothyroxine (SYNTHROID) 112 MCG tablet TAKE 1 TABLET EVERY DAY 4/26/21   Daxa Florence MD   lidocaine (LIDODERM) 5 % Place 1 patch onto the skin daily 12 hours on, 12 hours off. 4/16/21   Hansel Puente PA-C   nitroGLYCERIN (NITROLINGUAL) 0.4 MG/SPRAY 0.4 mg spray Place 1 spray under the tongue every 5 minutes as needed for Chest pain 2/4/21   Homa Santos MD        Allergies:  Aspirin, Diltiazem, Diltiazem hcl, Lorazepam, Sulfa antibiotics, Atorvastatin, Dabigatran, Mysoline [primidone], Nsaids, Other, and Primidone     Review of Systems:   · Constitutional: there has been no unanticipated weight loss. There's been no change in energy level, sleep pattern, or activity level. · Eyes: No visual changes or diplopia. No scleral icterus. · ENT: No Headaches, hearing loss or vertigo. No mouth sores or sore throat. · Cardiovascular: Reviewed in HPI  · Respiratory: No cough or wheezing, no sputum production. No hematemesis. · Gastrointestinal: No abdominal pain, appetite loss, blood in stools. No change in bowel or bladder habits. States she feels bloated   · Genitourinary: No dysuria, trouble voiding, or hematuria. · Musculoskeletal:  No gait disturbance, weakness or joint complaints. · Integumentary: No rash or pruritis. · Neurological: No headache, diplopia, change in muscle strength, numbness or tingling.  No change in gait, Value Date     05/19/2021     05/18/2021     05/17/2021    K 4.4 05/19/2021    K 4.6 05/18/2021    K 4.9 05/17/2021    K 4.9 12/16/2020    K 4.2 11/07/2020    K 4.1 03/25/2020     05/19/2021     05/18/2021     05/17/2021    CO2 23 05/19/2021    CO2 24 05/18/2021    CO2 22 05/17/2021    PHOS 3.1 03/25/2021    PHOS 3.3 12/23/2020    PHOS 3.1 12/17/2020    BUN 45 05/19/2021    BUN 39 05/18/2021    BUN 44 05/17/2021    CREATININE 1.6 05/19/2021    CREATININE 1.7 05/18/2021    CREATININE 1.4 05/17/2021     BNP:   Lab Results   Component Value Date    PROBNP 2,170 11/07/2020    PROBNP 2,375 08/04/2020    PROBNP 2,255 04/21/2020     Cardiac Imaging:  [facility] 3/19/2021 Dr Kenny Coy  Artery Findings/Result   LM Normal   LAD 50% ostial, 50% mid instent   Cx 50% mid at OM1 bifurcation, 30% OM1 mid at bifurcation   RI NA   % prox with multiple R-R collaterals   LVEDP 8   LVG NA due to renal failure      Intervention:         None     Post Cath Dx:       Stable CAD  Possible angina from  of RCA - poor candidate for  intervention with renal failure    Echo 4/30/2020   Left ventricular cavity size is normal. Normal left ventricular wall   thickness. Left ventricular function appears to be reduced with an estimated   ejection fraction of 45%. Diffuse hypokinesis. Echo 7/19/2019   Summary    Left ventricle - normal size, thickness, reduced function with EF of 45%  LV wall motion - diffuse hypokinesis. Mitral valve - thickened, annular calcification, moderate regurgitation  Aortic valve - thickened, calcified, mild regurgitation  Tricuspid valve - mild regurgitation with PASP of 25mmHg  Pulmonic valve - trivial regurgitation   Biatrial enlargement  Pacemaker / ICD lead is visualized in the right heart.     6/2019 Dr Kenny Coy  NSTEMI: . Angiogram findings were as below:      Findings:                      LM       Normal              LAD     50% ostial, mid 100%              Cx        40% OM1 at bifurcation              RCA     Mid 100%              LVG     Not performed              EDP     15 mmHg              L-LAD  Patent              S-PDA Known occluded  Severe Ca++:None  Post Cath Dx:Stable CAD, no apparent culprit for NSTEMI  Intervention:  None  Med Rec:        Continue aggressive med tx                          Continue plavix, no asa due to allergy. statin added. LDL 75   8/7/18  The PCI of complex proximal RCA chronic total occlusion was unsuccessful (J- score 3). Underwent successful Angioplasty of high-grade proximal RCA lesion proximal to the . RECOMMENDATIONS:  Attempt on this complex long  was unsuccessful. Lack   Of retrograde collaterals along with a very ambiguous cap as well as a large RV marginal branch and bridging collateral coming off at the cap makes it a  challenging repeat attempt. If she continues to have angina, it is recommended to proceed with the single-vessel SVG to the RCA. Echo: 2/17/16 ([facility])  Conclusions: Normal LV size and systolic function Mild to moderate mitral  regurgitation Mild tricuspid regurgitation  Findings:   Left At[facility]: Mild to moderate enlargement of the left atrium. Left Ventricle: Upper limits of normal left ventricle size. No left ventricular  hypertrophy. Normal left ventricular systolic function. The ejection fraction   Is visually estimated to be 55 %. Right [facility]: Normal right atrial size. RightVentricle: Normal right ventricle size. Normal right ventricular function. Aortic Valve: Tri-leaflet aortic valve. Mild aortic cusp calcification. No  aortic valve stenosis. Mild (1+) aortic valve regurgitation. Aorta: No dilatation of the aortic root. IVC/PA: Normal IVC size and normal respiratory  collapse consistent with normal right atrial pressure. Mitral Valve: Mild mitral valve leaflet calcification. Mild to moderate (2+) mitral valve  regurgitation. No mitral valve stenosis.    Tricuspid Valve: Mild (1+) tricuspid  regurgitation. The pulmonary artery pressure is normal.   Pulmonic Valve: Mild  pulmonic regurgitation. Pericardium: Normal pericardium with no significant  pericardial effusion. Assessment:    1. Chronic systolic heart failure (HCC) on bb, no ace/arb due to dizziness; aldosterone antagonist    2. Chronic atrial fibrillation s/p watchman Dr Mary Ann Roberts   3. Coronary artery disease involving autologous artery coronary bypass graft without angina pectoris    4. Renal insufficiency follows with Dr Severa Ribas:   1. Check blood work  2. Continue all current medications  3. RTO in 4-5 months    I appreciate the opportunity of cooperating in the care of this individual.    Svetlana Rockwell, APRN - CNS, CNS, 7/8/2021, 1:09 PM    QUALITY MEASURES  1. Tobacco Cessation Counseling: NA  2. Retake of BP if >140/90:   NA  3. Documentation to PCP/referring for new patient:  Sent to PCP at close of office visit  4. CAD patient on anti-platelet: Yes  5. CAD patient on STATIN therapy:  No did not tolerate  6.  Patient with CHF and aFib on anticoagulation:  Yes

## 2021-07-29 ENCOUNTER — TELEPHONE (OUTPATIENT)
Dept: FAMILY MEDICINE CLINIC | Age: 81
End: 2021-07-29

## 2021-07-29 ENCOUNTER — NURSE TRIAGE (OUTPATIENT)
Dept: OTHER | Facility: CLINIC | Age: 81
End: 2021-07-29

## 2021-07-29 NOTE — TELEPHONE ENCOUNTER
Received call from [address] at [facility] with Red Flag Complaint. Brief description of triage: itching/hives at times    Triage indicates for patient to be in next 3 days    Care advice provided, patient verbalizes understanding; denies any other questions or concerns; instructed to call back for any new or worsening symptoms. Writer provided warm transfer to [facility] at [facility] for appointment scheduling. Attention Provider: Thank you for allowing me to participate in the care of your patient. The patient was connected to triage in response to information provided to the [facility]. Please do not respond through this encounter as the response is not directed to a shared pool. Reason for Disposition   Hives has occurred 3 or more times in the last year and the cause is not known    Answer Assessment - Initial Assessment Questions  1. APPEARANCE: \"What does the rash look like? \"       No rash now    2. LOCATION: \"Where is the rash located? \"    states stomach and back when it occurs    3. NUMBER: \"How many hives are there? \"   None    4. SIZE: \"How big are the hives? \" (inches, cm, compare to coins) \"Do they all look the same or is there lots of variation in shape and size? \"   No hives    5. ONSET: \"When did the hives begin? \" (Hours or days ago)   States itching ongoing for 2 months    6. ITCHING: \"Does it itch? \" If so, ask: \"How bad is the itch? \"     - MILD: doesn't interfere with normal activities    - MODERATE-SEVERE: interferes with work, school, sleep, or other activities     Severe    7. RECURRENT PROBLEM: \"Have you had hives before? \" If so, ask: \"When was the last time? \" and \"What happened that time? \"       No    8. TRIGGERS: \"Were you exposed to any new food, plant, cosmetic product or animal just before the hives began? \"      No    9. OTHER SYMPTOMS: \"Do you have any other symptoms? \" (e.g., fever, tongue swelling, difficulty breathing, abdominal pain)    Shortness of breath mild in last 2 weeks, off and on, none at time of call    10. PREGNANCY: \"Is there any chance you are pregnant? \" \"When was your last menstrual period? \"   post menopause    Protocols used: HIVES-ADULT-[place]

## 2021-07-29 NOTE — TELEPHONE ENCOUNTER
----- Message from Anahi Maguire sent at 7/29/2021  8:10 AM EDT -----  Subject: Appointment Request    Reason for Call: Semi-Routine Return from RN Triage    QUESTIONS  Type of Appointment? Established Patient  Reason for appointment request? No appointments available during search  Additional Information for Provider? Return from nurse triage for red flag   of hives and itching. Was told to be seen in the next 3 days-prefers today   if possible. No appts. showing for preferred dr. Jean Paul Velarde). Please call to   schedule her in.   ---------------------------------------------------------------------------  --------------  CALL BACK INFO  What is the best way for the office to contact you? OK to leave message on   voicemail  Preferred Call Back Phone Number? [phone]  ---------------------------------------------------------------------------  --------------  SCRIPT ANSWERS  Patient needs to be seen within 5 days? Yes  Nurse Name? Cathleen Montoya  Have you been diagnosed with, awaiting test results for, or told that you   are suspected of having COVID-19 (Coronavirus)? (If patient has tested   negative or was tested as a requirement for work, school, or travel and   not based on symptoms, answer no)? No  Do you currently have flu-like symptoms including fever or chills, cough,   shortness of breath, difficulty breathing, or new loss of taste or smell? No  Have you had close contact with someone with COVID-19 in the last 14 days? No  (Service Expert  click yes below to proceed with [employer] As Usual   Scheduling)?  Yes

## 2021-07-30 ENCOUNTER — OFFICE VISIT (OUTPATIENT)
Dept: FAMILY MEDICINE CLINIC | Age: 81
End: 2021-07-30
Payer: MEDICARE

## 2021-07-30 VITALS
BODY MASS INDEX: 22.7 KG/M2 | WEIGHT: 128.13 LBS | SYSTOLIC BLOOD PRESSURE: 110 MMHG | OXYGEN SATURATION: 98 % | TEMPERATURE: 97.2 F | HEART RATE: 71 BPM | DIASTOLIC BLOOD PRESSURE: 70 MMHG | RESPIRATION RATE: 12 BRPM

## 2021-07-30 DIAGNOSIS — L29.9 ITCHING: Primary | ICD-10-CM

## 2021-07-30 DIAGNOSIS — M25.551 HIP PAIN, ACUTE, RIGHT: ICD-10-CM

## 2021-07-30 DIAGNOSIS — L21.9 SEBORRHEIC DERMATITIS: ICD-10-CM

## 2021-07-30 PROCEDURE — 1123F ACP DISCUSS/DSCN MKR DOCD: CPT | Performed by: FAMILY MEDICINE

## 2021-07-30 PROCEDURE — 1090F PRES/ABSN URINE INCON ASSESS: CPT | Performed by: FAMILY MEDICINE

## 2021-07-30 PROCEDURE — 1036F TOBACCO NON-USER: CPT | Performed by: FAMILY MEDICINE

## 2021-07-30 PROCEDURE — G8420 CALC BMI NORM PARAMETERS: HCPCS | Performed by: FAMILY MEDICINE

## 2021-07-30 PROCEDURE — 4040F PNEUMOC VAC/ADMIN/RCVD: CPT | Performed by: FAMILY MEDICINE

## 2021-07-30 PROCEDURE — 99213 OFFICE O/P EST LOW 20 MIN: CPT | Performed by: FAMILY MEDICINE

## 2021-07-30 PROCEDURE — G8428 CUR MEDS NOT DOCUMENT: HCPCS | Performed by: FAMILY MEDICINE

## 2021-07-30 PROCEDURE — G8399 PT W/DXA RESULTS DOCUMENT: HCPCS | Performed by: FAMILY MEDICINE

## 2021-07-30 RX ORDER — AMMONIUM LACTATE 12 G/100G
LOTION TOPICAL
Qty: 225 G | Refills: 5 | Status: SHIPPED | OUTPATIENT
Start: 2021-07-30 | End: 2021-11-02 | Stop reason: ALTCHOICE

## 2021-07-30 RX ORDER — DOXEPIN HYDROCHLORIDE 10 MG/1
10 CAPSULE ORAL NIGHTLY
Qty: 30 CAPSULE | Refills: 3 | Status: SHIPPED | OUTPATIENT
Start: 2021-07-30 | End: 2021-10-26

## 2021-07-30 NOTE — PROGRESS NOTES
Subjective:      Patient ID: Berta Martini is a [de-identified] y.o. female. CC: Patient presents for acute medical problem-itching and right hip pain. Medical assistant notes reviewed. HPI pt is here due to itching all over her body, right hip pain, ongoing for a while now but getting worse. Patient feels a rash on her feet states did significantly improve with the oral medications but the itching has not. She is itching everywhere that even wakes her up at nighttime. There is been no change of her medications of recent date. She does use Cetaphil soap and has used Cetaphil lotion but she still having persistent itching. She is also having significant right hip pain. This is been an ongoing issue for her as well. She had a left hip replaced years ago but is never any surgical intervention right hip. She has discomfort with walking. Sitting and sleeping does not cause her any discomfort    Review of Systems  Allergies   Allergen Reactions    Aspirin Nausea Only    Diltiazem Anaphylaxis    Diltiazem Hcl Anaphylaxis    Lorazepam Other (See Comments)     hallucinations  hallucinations  hallucinations    Sulfa Antibiotics Rash and Hives    Atorvastatin Other (See Comments)     Muscle pains  Muscle pains  Muscle pains    Dabigatran Nausea Only     And indigestion  And indigestion    Aka Pradaxa  And indigestion  And indigestion  And indigestion    Aka Pradaxa  And indigestion  And indigestion  And indigestion    Aka Pradaxa    Mysoline [Primidone]     Nsaids      Other reaction(s): Unknown    Other Other (See Comments)     Nitroglycerin patches causes severe headaches    Primidone      Other reaction(s): Unknown     Objective:   Physical Exam  Vitals and nursing note reviewed. Constitutional:       General: She is not in acute distress. Appearance: She is well-developed. Musculoskeletal:      Right hip: Normal. No deformity or tenderness. Normal range of motion. Normal strength.       Left hip: Normal.   Skin:     General: Skin is warm. Findings: Rash present. Neurological:      Mental Status: She is alert. Psychiatric:         Behavior: Behavior is cooperative. Assessment:      Mary Kate Randall was seen today for pruritis and hip pain. Diagnoses and all orders for this visit:    Itching    Hip pain, acute, right  -     XR HIP RIGHT (2-3 VIEWS); Future    Seborrheic dermatitis    Other orders  -     doxepin (SINEQUAN) 10 MG capsule; Take 1 capsule by mouth nightly  -     ammonium lactate (LAC-HYDRIN) 12 % lotion; Apply topically as needed. Plan:      Patient was advised to continue using moisturizing soaps and start Lac-Hydrin lotion. She is also been to start doxepin at 10 mg for a week and if her symptoms not better she is can increase to 20 mg nightly. She will need to let me know if that is working as we could go up to 25 mg. Proceed with x-ray of the right hip and decide on further management after x-ray    RTC at normal appointment time    Medical decision making of low complexity  Please note that this chart was generated using Dragon dictation software. Although every effort was made to ensure the accuracy of this automated transcription, some errors in transcription may have occurred.

## 2021-08-02 ENCOUNTER — HOSPITAL ENCOUNTER (OUTPATIENT)
Dept: GENERAL RADIOLOGY | Age: 81
Discharge: HOME | End: 2021-08-02
Payer: MEDICARE

## 2021-08-02 ENCOUNTER — HOSPITAL ENCOUNTER (OUTPATIENT)
Age: 81
Discharge: HOME | End: 2021-08-02
Payer: MEDICARE

## 2021-08-02 ENCOUNTER — TELEPHONE (OUTPATIENT)
Dept: FAMILY MEDICINE CLINIC | Age: 81
End: 2021-08-02

## 2021-08-02 DIAGNOSIS — I12.9 BENIGN HYPERTENSION WITH CHRONIC KIDNEY DISEASE, STAGE III (HCC): ICD-10-CM

## 2021-08-02 DIAGNOSIS — N18.32 STAGE 3B CHRONIC KIDNEY DISEASE (HCC): ICD-10-CM

## 2021-08-02 DIAGNOSIS — N18.30 BENIGN HYPERTENSION WITH CHRONIC KIDNEY DISEASE, STAGE III (HCC): ICD-10-CM

## 2021-08-02 DIAGNOSIS — M25.551 HIP PAIN, ACUTE, RIGHT: ICD-10-CM

## 2021-08-02 DIAGNOSIS — I50.22 CHRONIC SYSTOLIC CONGESTIVE HEART FAILURE (HCC): ICD-10-CM

## 2021-08-02 DIAGNOSIS — D50.9 IRON DEFICIENCY ANEMIA, UNSPECIFIED IRON DEFICIENCY ANEMIA TYPE: ICD-10-CM

## 2021-08-02 DIAGNOSIS — N18.9 ACUTE KIDNEY INJURY SUPERIMPOSED ON CHRONIC KIDNEY DISEASE (HCC): ICD-10-CM

## 2021-08-02 DIAGNOSIS — N17.9 ACUTE KIDNEY INJURY SUPERIMPOSED ON CHRONIC KIDNEY DISEASE (HCC): ICD-10-CM

## 2021-08-02 LAB
ALBUMIN SERPL-MCNC: 4.2 G/DL (ref 3.4–5)
ANION GAP SERPL CALCULATED.3IONS-SCNC: 14 MMOL/L (ref 3–16)
BUN BLDV-MCNC: 51 MG/DL (ref 7–20)
CALCIUM SERPL-MCNC: 9.2 MG/DL (ref 8.3–10.6)
CHLORIDE BLD-SCNC: 108 MMOL/L (ref 99–110)
CO2: 21 MMOL/L (ref 21–32)
CREAT SERPL-MCNC: 2 MG/DL (ref 0.6–1.2)
CREATININE URINE: 110.6 MG/DL (ref 28–259)
GFR AFRICAN AMERICAN: 29
GFR NON-AFRICAN AMERICAN: 24
GLUCOSE BLD-MCNC: 83 MG/DL (ref 70–99)
HCT VFR BLD CALC: 45.4 % (ref 36–48)
HEMOGLOBIN: 14.1 G/DL (ref 12–16)
MCH RBC QN AUTO: 26.3 PG (ref 26–34)
MCHC RBC AUTO-ENTMCNC: 31 G/DL (ref 31–36)
MCV RBC AUTO: 84.7 FL (ref 80–100)
PDW BLD-RTO: 19.3 % (ref 12.4–15.4)
PHOSPHORUS: 4.8 MG/DL (ref 2.5–4.9)
PLATELET # BLD: 454 K/UL (ref 135–450)
PMV BLD AUTO: 9.4 FL (ref 5–10.5)
POTASSIUM SERPL-SCNC: 4.9 MMOL/L (ref 3.5–5.1)
PRO-BNP: 2071 PG/ML (ref 0–449)
PROTEIN PROTEIN: 9 MG/DL
RBC # BLD: 5.36 M/UL (ref 4–5.2)
SODIUM BLD-SCNC: 143 MMOL/L (ref 136–145)
WBC # BLD: 11.9 K/UL (ref 4–11)

## 2021-08-02 PROCEDURE — 80069 RENAL FUNCTION PANEL: CPT

## 2021-08-02 PROCEDURE — 83880 ASSAY OF NATRIURETIC PEPTIDE: CPT

## 2021-08-02 PROCEDURE — 73502 X-RAY EXAM HIP UNI 2-3 VIEWS: CPT

## 2021-08-02 PROCEDURE — 36415 COLL VENOUS BLD VENIPUNCTURE: CPT

## 2021-08-02 PROCEDURE — 84156 ASSAY OF PROTEIN URINE: CPT

## 2021-08-02 PROCEDURE — 82570 ASSAY OF URINE CREATININE: CPT

## 2021-08-02 PROCEDURE — 85027 COMPLETE CBC AUTOMATED: CPT

## 2021-08-03 ENCOUNTER — TELEPHONE (OUTPATIENT)
Dept: CARDIOLOGY CLINIC | Age: 81
End: 2021-08-03

## 2021-08-03 NOTE — TELEPHONE ENCOUNTER
Ms. Josesito Jackson called back she relayed incorrect weights before   She said her weight for last couple of weeks has been between 125.6 - 125.8      She does report an increase in shortness of breath with activity such as doing the dishes, doing laundry, sweeping the floors. If she sits down for a few minutes she feels better and is able to resume activity. She denies any pain, and has not noticed any swelling.

## 2021-08-03 NOTE — TELEPHONE ENCOUNTER
----- Message from TIFFANIE Gomez sent at 8/3/2021  9:14 AM EDT -----  Please find out what her weight is and any shortness of breath  We need to cut back a little on her diuretic  thanks

## 2021-08-05 ENCOUNTER — APPOINTMENT (RX ONLY)
Dept: URBAN - METROPOLITAN AREA CLINIC 170 | Facility: CLINIC | Age: 81
Setting detail: DERMATOLOGY
End: 2021-08-05

## 2021-08-05 DIAGNOSIS — L29.89 OTHER PRURITUS: ICD-10-CM | Status: INADEQUATELY CONTROLLED

## 2021-08-05 DIAGNOSIS — L29.8 OTHER PRURITUS: ICD-10-CM | Status: INADEQUATELY CONTROLLED

## 2021-08-05 PROBLEM — L29.9 PRURITUS, UNSPECIFIED: Status: ACTIVE | Noted: 2021-08-05

## 2021-08-05 PROCEDURE — 99204 OFFICE O/P NEW MOD 45 MIN: CPT

## 2021-08-05 PROCEDURE — ? PRESCRIPTION

## 2021-08-05 PROCEDURE — ? PRESCRIPTION SAMPLES PROVIDED

## 2021-08-05 PROCEDURE — ? EDUCATIONAL RESOURCES PROVIDED

## 2021-08-05 PROCEDURE — ? ADDITIONAL NOTES

## 2021-08-05 PROCEDURE — ? PRESCRIPTION MEDICATION MANAGEMENT

## 2021-08-05 PROCEDURE — ? COUNSELING

## 2021-08-05 RX ORDER — CLOBETASOL PROPIONATE 0.5 MG/ML
SOLUTION TOPICAL
Qty: 1 | Refills: 5 | Status: ERX | COMMUNITY
Start: 2021-08-05

## 2021-08-05 RX ADMIN — CLOBETASOL PROPIONATE: 0.5 SOLUTION TOPICAL at 00:00

## 2021-08-05 NOTE — HPI: ITCHING
What Type Of Note Output Would You Prefer (Optional)?: Bullet Format
How Did Your Itching Occur?: gradual in onset  (over a period of years)
How Severe Is Your Itching?: mild
Additional History: There was a cream that her pcp prescribed she doesn’t remember the name but it burned.

## 2021-08-13 ENCOUNTER — TELEPHONE (OUTPATIENT)
Dept: CARDIOLOGY CLINIC | Age: 81
End: 2021-08-13

## 2021-08-13 NOTE — TELEPHONE ENCOUNTER
Patient has an appt at the end of October with DANIELLE in [address]. Daughter would like her to see NPRG sooner because of increased SOB, losing weight, decreased appetite, and patient just not acting like herself. She will bring patient to any office to see [address] [place] [address]. Offered sooner appt with TJ but she will only see NPRG . Please call daughter to advise .

## 2021-08-16 ENCOUNTER — NURSE ONLY (OUTPATIENT)
Dept: CARDIOLOGY CLINIC | Age: 81
End: 2021-08-16
Payer: MEDICARE

## 2021-08-16 ENCOUNTER — HOSPITAL ENCOUNTER (OUTPATIENT)
Dept: CARDIAC CATH/INVASIVE PROCEDURES | Age: 81
Discharge: HOME | End: 2021-08-16
Attending: INTERNAL MEDICINE | Admitting: INTERNAL MEDICINE
Payer: MEDICARE

## 2021-08-16 ENCOUNTER — OFFICE VISIT (OUTPATIENT)
Dept: CARDIOLOGY CLINIC | Age: 81
End: 2021-08-16
Payer: MEDICARE

## 2021-08-16 ENCOUNTER — HOSPITAL ENCOUNTER (OUTPATIENT)
Age: 81
Discharge: HOME | End: 2021-08-16
Payer: MEDICARE

## 2021-08-16 VITALS
DIASTOLIC BLOOD PRESSURE: 80 MMHG | HEART RATE: 88 BPM | SYSTOLIC BLOOD PRESSURE: 120 MMHG | HEIGHT: 63 IN | OXYGEN SATURATION: 94 % | WEIGHT: 124 LBS | BODY MASS INDEX: 21.97 KG/M2

## 2021-08-16 DIAGNOSIS — R30.0 PAINFUL URGING TO URINATE: ICD-10-CM

## 2021-08-16 DIAGNOSIS — I50.22 CHRONIC SYSTOLIC CONGESTIVE HEART FAILURE (HCC): Primary | ICD-10-CM

## 2021-08-16 DIAGNOSIS — Z95.818 PRESENCE OF WATCHMAN LEFT ATRIAL APPENDAGE CLOSURE DEVICE: ICD-10-CM

## 2021-08-16 DIAGNOSIS — Z95.0 PACEMAKER: ICD-10-CM

## 2021-08-16 DIAGNOSIS — I48.20 CHRONIC ATRIAL FIBRILLATION (HCC): ICD-10-CM

## 2021-08-16 DIAGNOSIS — I48.0 PAF (PAROXYSMAL ATRIAL FIBRILLATION) (HCC): ICD-10-CM

## 2021-08-16 DIAGNOSIS — I50.22 CHRONIC SYSTOLIC CONGESTIVE HEART FAILURE (HCC): ICD-10-CM

## 2021-08-16 DIAGNOSIS — N28.9 RENAL INSUFFICIENCY: ICD-10-CM

## 2021-08-16 DIAGNOSIS — R00.1 SYMPTOMATIC BRADYCARDIA: ICD-10-CM

## 2021-08-16 DIAGNOSIS — I25.810 CORONARY ARTERY DISEASE INVOLVING AUTOLOGOUS ARTERY CORONARY BYPASS GRAFT WITHOUT ANGINA PECTORIS: ICD-10-CM

## 2021-08-16 LAB
ANION GAP SERPL CALCULATED.3IONS-SCNC: 11 MMOL/L (ref 3–16)
BACTERIA: ABNORMAL /HPF
BILIRUBIN URINE: NEGATIVE
BLOOD, URINE: NEGATIVE
BUN BLDV-MCNC: 55 MG/DL (ref 7–20)
CALCIUM SERPL-MCNC: 9.6 MG/DL (ref 8.3–10.6)
CHLORIDE BLD-SCNC: 112 MMOL/L (ref 99–110)
CLARITY: CLEAR
CO2: 22 MMOL/L (ref 21–32)
COLOR: YELLOW
CREAT SERPL-MCNC: 1.7 MG/DL (ref 0.6–1.2)
EKG ATRIAL RATE: 70 BPM
EKG ATRIAL RATE: 77 BPM
EKG DIAGNOSIS: NORMAL
EKG DIAGNOSIS: NORMAL
EKG P AXIS: 92 DEGREES
EKG P-R INTERVAL: 100 MS
EKG P-R INTERVAL: 270 MS
EKG Q-T INTERVAL: 384 MS
EKG Q-T INTERVAL: 384 MS
EKG QRS DURATION: 94 MS
EKG QRS DURATION: 98 MS
EKG QTC CALCULATION (BAZETT): 414 MS
EKG QTC CALCULATION (BAZETT): 434 MS
EKG R AXIS: -21 DEGREES
EKG R AXIS: -24 DEGREES
EKG T AXIS: -30 DEGREES
EKG T AXIS: -43 DEGREES
EKG VENTRICULAR RATE: 70 BPM
EKG VENTRICULAR RATE: 77 BPM
EPITHELIAL CELLS, UA: 7 /HPF (ref 0–5)
GFR AFRICAN AMERICAN: 35
GFR NON-AFRICAN AMERICAN: 29
GLUCOSE BLD-MCNC: 94 MG/DL (ref 70–99)
GLUCOSE URINE: NEGATIVE MG/DL
HYALINE CASTS: 1 /LPF (ref 0–8)
KETONES, URINE: NEGATIVE MG/DL
LEUKOCYTE ESTERASE, URINE: ABNORMAL
MICROSCOPIC EXAMINATION: YES
NITRITE, URINE: NEGATIVE
PH UA: 6 (ref 5–8)
POTASSIUM SERPL-SCNC: 5.1 MMOL/L (ref 3.5–5.1)
PRO-BNP: 6300 PG/ML (ref 0–449)
PROTEIN UA: NEGATIVE MG/DL
RBC UA: 1 /HPF (ref 0–4)
SODIUM BLD-SCNC: 145 MMOL/L (ref 136–145)
SPECIFIC GRAVITY UA: 1.02 (ref 1–1.03)
URINE REFLEX TO CULTURE: ABNORMAL
URINE TYPE: ABNORMAL
UROBILINOGEN, URINE: 0.2 E.U./DL
WBC UA: 4 /HPF (ref 0–5)

## 2021-08-16 PROCEDURE — 92960 CARDIOVERSION ELECTRIC EXT: CPT

## 2021-08-16 PROCEDURE — 1036F TOBACCO NON-USER: CPT | Performed by: CLINICAL NURSE SPECIALIST

## 2021-08-16 PROCEDURE — 93010 ELECTROCARDIOGRAM REPORT: CPT | Performed by: INTERNAL MEDICINE

## 2021-08-16 PROCEDURE — 99152 MOD SED SAME PHYS/QHP 5/>YRS: CPT | Performed by: INTERNAL MEDICINE

## 2021-08-16 PROCEDURE — 99215 OFFICE O/P EST HI 40 MIN: CPT | Performed by: CLINICAL NURSE SPECIALIST

## 2021-08-16 PROCEDURE — 93005 ELECTROCARDIOGRAM TRACING: CPT | Performed by: INTERNAL MEDICINE

## 2021-08-16 PROCEDURE — 2500000003 HC RX 250 WO HCPCS

## 2021-08-16 PROCEDURE — 7100000010 HC PHASE II RECOVERY - FIRST 15 MIN

## 2021-08-16 PROCEDURE — 1123F ACP DISCUSS/DSCN MKR DOCD: CPT | Performed by: CLINICAL NURSE SPECIALIST

## 2021-08-16 PROCEDURE — 4040F PNEUMOC VAC/ADMIN/RCVD: CPT | Performed by: CLINICAL NURSE SPECIALIST

## 2021-08-16 PROCEDURE — 36415 COLL VENOUS BLD VENIPUNCTURE: CPT

## 2021-08-16 PROCEDURE — 92960 CARDIOVERSION ELECTRIC EXT: CPT | Performed by: INTERNAL MEDICINE

## 2021-08-16 PROCEDURE — G8399 PT W/DXA RESULTS DOCUMENT: HCPCS | Performed by: CLINICAL NURSE SPECIALIST

## 2021-08-16 PROCEDURE — 80048 BASIC METABOLIC PNL TOTAL CA: CPT

## 2021-08-16 PROCEDURE — 1090F PRES/ABSN URINE INCON ASSESS: CPT | Performed by: CLINICAL NURSE SPECIALIST

## 2021-08-16 PROCEDURE — 81001 URINALYSIS AUTO W/SCOPE: CPT

## 2021-08-16 PROCEDURE — 81003 URINALYSIS AUTO W/O SCOPE: CPT | Performed by: CLINICAL NURSE SPECIALIST

## 2021-08-16 PROCEDURE — G8420 CALC BMI NORM PARAMETERS: HCPCS | Performed by: CLINICAL NURSE SPECIALIST

## 2021-08-16 PROCEDURE — 83880 ASSAY OF NATRIURETIC PEPTIDE: CPT

## 2021-08-16 PROCEDURE — G8427 DOCREV CUR MEDS BY ELIG CLIN: HCPCS | Performed by: CLINICAL NURSE SPECIALIST

## 2021-08-16 RX ORDER — ACETAMINOPHEN 325 MG/1
1000 TABLET ORAL DAILY
COMMUNITY
Start: 2021-04-05 | End: 2022-08-16

## 2021-08-16 NOTE — PROCEDURES
[facility], [place]     Electrophysiology Procedure Note       Date of Procedure: 8/16/2021  Patient's Name: Savana Curry  YOB: 1940   Medical Record Number: [id number]  Procedure Performed by: Peggy Demarco MD    Procedures performed:  IV sedation. External Electrical cardioversion   [id number]  ASA 3    Indication of the procedure: Persistent atrial fibrillation    Details of procedure: The patient was brought to the cath lab area in a fasting and non-sedated state. The risks, benefits and alternatives of the procedure were discussed with the patient. The patient opted to proceed with the procedure. Written informed consent was signed and placed in the chart. A timeout protocol was completed to identify the patient and the procedure being performed. I pushed 40 mg Brevital.    We monitored the patient's level of consciousness and vital signs/physiologic status throughout the procedure duration (see start and stop times below). Sedation:     Sedation start: 1410  Sedation stop: 1435     Patient is on chronic anticoagulation therapy. Then we used Brevital for sedation and electrical DC cardioversion was perfomred using 200J, synchronized shock. Patient was converted to sinus rhythm at 62 bpm. The patient tolerated the procedure well and there were no complications. Conclusion:   Successful external DC cardioversion of atrial fibrillation . Plan:   The patient can be discharged if remains stable. Will continue with medical therapy.

## 2021-08-16 NOTE — PROGRESS NOTES
Patient comes in for programming evaluation for her pacemaker. All sensing and pacing parameters are within normal range. Battery life 13.5 years  AP 94%.  3%. Currently in AF/AFL. Started having episodes again on 8/13/2021. Patient remains on metoprolol. Patient has Watchman and is off AC medications. Today we increased RR. Please see interrogation for more detail. Patient will see NPRG today and follow up in 3 months in office or remotely.

## 2021-08-16 NOTE — PROGRESS NOTES
[facility]  Progress Note    Primary Care Doctor:  Anastacia Cruz MD    Chief Complaint   Patient presents with    Follow-up    Congestive Heart Failure    Shortness of Breath        History of Present Illness:  [de-identified] y.o. female with history of Ms. Hunter Rodriguez She has a history of atrial fibrillation (on xarelto), TIA, chronic kidney disease, CHF, hypertension, hyperlipidemia, mitral valve disease, hypothyroidism. María Blancas last LVEF was 45% on 7/19/19. CABG, 7/08 cath- patent LIMA to LAD, SVG occluded to RCA; 8/2019 left to right fem-fem bypass and left fem to above knee popliteal bypass  Pacemaker generator change 1/25/21  Watchman implanted 5/17/2021    I had the pleasure of seeing Chemo Ready in follow up for urgent visit for shortness of breath over the past couple days along with not wanting to eat. She is in a wheel chair which is not her normal.  Pacemaker checked today which shows Afib/flutter since August 15th. Her weight is down 4 pounds. She has been off her anticoag due to [employer] on 5/17/21. She does not have any edema. Past Medical History:   has a past medical history of Allergic rhinitis, cause unspecified, Arthritis, Atrial fibrillation ([address] Bronchopneumonia, CAD (coronary artery disease), Cerebral artery occlusion with cerebral infarction ([address] Chronic gouty arthropathy, Chronic kidney disease, Congestive heart failure, unspecified, Degeneration of cervical intervertebral disc, Essential and other specified forms of tremor, Gout, HIGH CHOLESTEROL, History of CVA (cerebrovascular accident), Hx of blood clots, Hypertension, Influenza, Mitral valve stenosis and aortic valve insufficiency, Movement disorder, Pacemaker, Peptic ulcer, unspecified site, unspecified as acute or chronic, without mention of hemorrhage, perforation, or obstruction, Thyroid disease, Unspecified disorder of kidney and ureter, and Unspecified hypothyroidism.   Surgical History:   has a past surgical history that includes back surgery; Cholecystectomy; Cardiac surgery; Coronary artery bypass graft (1987); shoulder surgery; Cardiac catheterization (7/2012); hip surgery (Left, 03/16/2017); joint replacement; Cardiac catheterization (08/07/2018); Percutaneous Transluminal Coronary Angio (11/04/2014); pr njx aa&/strd tfrml epi lumbar/sacral 1 level (Right, 12/3/2018); Femoral-femoral Bypass Graft (N/A, 8/22/2019); femoral bypass (Left, 8/22/2019); and IR KYPHOPLASTY LUMBAR 1 VERTEBRAL BODY (5/14/2021). Social History:   reports that she quit smoking about 34 years ago. Her smoking use included cigarettes. She has a 28.00 pack-year smoking history. She has never used smokeless tobacco. She reports previous alcohol use. She reports that she does not use drugs. Family History:   Family History   Problem Relation Age of Onset    Cancer Sister     Heart Disease Sister     Diabetes Brother     Hypertension Brother     Heart Disease Brother     Stroke Maternal Aunt     Heart Disease Maternal Aunt     Diabetes Maternal Uncle     Hypertension Paternal Aunt     Cancer Maternal Grandmother     Cancer Paternal Grandmother     Rheum Arthritis Neg Hx     Lupus Neg Hx        Home Medications:  Prior to Admission medications    Medication Sig Start Date End Date Taking? Authorizing Provider   acetaminophen (TYLENOL) 325 MG tablet Take 650 mg by mouth every 4 hours as needed 4/5/21  Yes Historical Provider, MD   doxepin (SINEQUAN) 10 MG capsule Take 1 capsule by mouth nightly 7/30/21  Yes Deuce Kathleen MD   ammonium lactate (LAC-HYDRIN) 12 % lotion Apply topically as needed. 7/30/21  Yes Deuce Kathleen MD   clopidogrel (PLAVIX) 75 MG tablet Take 1 tablet by mouth daily 7/7/21  Yes Peggy Demarco MD   betamethasone valerate (VALISONE) 0.1 % cream Apply topically 2 times daily.  6/29/21  Yes Deuce Kathleen MD   metoprolol succinate (TOPROL XL) 25 MG extended release tablet TAKE 1 TABLET TWICE DAILY 6/4/21 Yes Shivam Spivey MD   topiramate (TOPAMAX) 50 MG tablet TAKE 2 TABLETS TWICE DAILY 6/4/21  Yes Shivam Spivey MD   ipratropium (ATROVENT) 0.06 % nasal spray 1 spray by Each Nostril route 4 times daily 6/1/21  Yes Shivam Spivey MD   vitamin D (ERGOCALCIFEROL) 1.25 MG (00778 UT) CAPS capsule Take 1 capsule by mouth once a week 5/26/21  Yes Shivam Spivey MD   allopurinol (ZYLOPRIM) 100 MG tablet TAKE 1 TABLET EVERY DAY (ALONG WITH 300MG TABLET) 5/25/21  Yes Shivam Spivey MD   torsemide (DEMADEX) 10 MG tablet TAKE 1 TABLET EVERY OTHER DAY ALTERNATING WITH  2  TABLETS EVERY OTHER DAY 5/25/21  Yes Shivam Spivey MD   aspirin EC 81 MG EC tablet Take 1 tablet by mouth daily 5/17/21  Yes TIFFANIE Taylor CNP   levothyroxine (SYNTHROID) 112 MCG tablet TAKE 1 TABLET EVERY DAY 4/26/21  Yes Shivam Spivey MD   nitroGLYCERIN (NITROLINGUAL) 0.4 MG/SPRAY 0.4 mg spray Place 1 spray under the tongue every 5 minutes as needed for Chest pain 2/4/21  Yes Catrina Pendleton MD   lidocaine (LIDODERM) 5 % Place 1 patch onto the skin daily 12 hours on, 12 hours off. Patient not taking: Reported on 8/16/2021 4/16/21   Javi Childs PA-C        Allergies:  Aspirin, Diltiazem, Diltiazem hcl, Lorazepam, Sulfa antibiotics, Atorvastatin, Dabigatran, Mysoline [primidone], Nsaids, Other, and Primidone     Review of Systems:   · Constitutional: there has been no unanticipated weight loss. There's been no change in energy level, sleep pattern, or activity level. · Eyes: No visual changes or diplopia. No scleral icterus. · ENT: No Headaches, hearing loss or vertigo. No mouth sores or sore throat. · Cardiovascular: Reviewed in HPI  · Respiratory: No cough or wheezing, no sputum production. No hematemesis. · Gastrointestinal: No abdominal pain, appetite loss, blood in stools. No change in bowel or bladder habits.  States she feels bloated   · Genitourinary: No dysuria, trouble voiding, or hematuria. · Musculoskeletal:  No gait disturbance, weakness or joint complaints. · Integumentary: No rash or pruritis. · Neurological: No headache, diplopia, change in muscle strength, numbness or tingling. No change in gait, balance, coordination, mood, affect, memory, mentation, behavior. · Psychiatric: No anxiety, no depression. · Endocrine: No malaise, fatigue or temperature intolerance. No excessive thirst, fluid intake, or urination. No tremor. · Hematologic/Lymphatic: No abnormal bruising or bleeding, blood clots or swollen lymph nodes. · Allergic/Immunologic: No nasal congestion or hives. Physical Examination:    Vitals:    08/16/21 0948   BP: 120/80   Site: Left Upper Arm   Position: Sitting   Cuff Size: Medium Adult   Pulse: 88   SpO2: 94%   Weight: 124 lb (56.2 kg)   Height: 5' 3\" (1.6 m)        Constitutional and General Appearance: Warm and dry, no apparent distress, normal coloration  HEENT:  Normocephalic, atraumatic  Respiratory:  · Normal excursion and expansion without use of accessory muscles  · Resp Auscultation: Normal breath sounds without dullness  Cardiovascular:  · The apical impulses not displaced  · Heart tones are crisp and normal  · JVP normal H2O  · irregular rhythm  · Peripheral pulses are symmetrical and full  · There is no clubbing, cyanosis of the extremities.   · No ankle edema  · Pedal Pulses: 2+ and equal   Abdomen:  · No masses or tenderness  · Liver/Spleen: No Abnormalities Noted  Neurological/Psychiatric:  · Alert and oriented in all spheres  · Moves all extremities well  · Exhibits normal gait balance and coordination  · No abnormalities of mood, affect, memory, mentation, or behavior are noted    Lab Data:    CBC:   Lab Results   Component Value Date    WBC 11.9 08/02/2021    WBC 17.6 05/19/2021    WBC 18.9 05/18/2021    RBC 5.36 08/02/2021    RBC 5.05 05/19/2021    RBC 4.99 05/18/2021    HGB 14.1 08/02/2021    HGB 13.4 05/19/2021    HGB 13.5 05/18/2021    HCT 45.4    Intervention:         None     Post Cath Dx:       Stable CAD  Possible angina from  of RCA - poor candidate for  intervention with renal failure    Echo 4/30/2020   Left ventricular cavity size is normal. Normal left ventricular wall   thickness. Left ventricular function appears to be reduced with an estimated   ejection fraction of 45%. Diffuse hypokinesis. Echo 7/19/2019   Summary    Left ventricle - normal size, thickness, reduced function with EF of 45%  LV wall motion - diffuse hypokinesis. Mitral valve - thickened, annular calcification, moderate regurgitation  Aortic valve - thickened, calcified, mild regurgitation  Tricuspid valve - mild regurgitation with PASP of 25mmHg  Pulmonic valve - trivial regurgitation   Biatrial enlargement  Pacemaker / ICD lead is visualized in the right heart. 6/2019 Dr Lida Cedeño  NSTEMI: . Angiogram findings were as below:      Findings:                      LM       Normal              LAD     50% ostial, mid 100%              Cx        40% OM1 at bifurcation              RCA     Mid 100%              LVG     Not performed              EDP     15 mmHg              L-LAD  Patent              S-PDA Known occluded  Severe Ca++:None  Post Cath Dx:Stable CAD, no apparent culprit for NSTEMI  Intervention:  None  Med Rec:        Continue aggressive med tx                          Continue plavix, no asa due to allergy. statin added. LDL 75   8/7/18  The PCI of complex proximal RCA chronic total occlusion was unsuccessful (J- score 3). Underwent successful Angioplasty of high-grade proximal RCA lesion proximal to the . RECOMMENDATIONS:  Attempt on this complex long  was unsuccessful. Lack   Of retrograde collaterals along with a very ambiguous cap as well as a large RV marginal branch and bridging collateral coming off at the cap makes it a  challenging repeat attempt.  If she continues to have angina, it is recommended to proceed with the single-vessel SVG to this individual.    TIFFANIE Dobbs - CNS, CNS, 8/16/2021, 11:08 AM    QUALITY MEASURES  1. Tobacco Cessation Counseling: NA  2. Retake of BP if >140/90:   NA  3. Documentation to PCP/referring for new patient:  Sent to PCP at close of office visit  4. CAD patient on anti-platelet: Yes  5. CAD patient on STATIN therapy:  No did not tolerate  6.  Patient with CHF and aFib on anticoagulation:  Yes

## 2021-08-16 NOTE — H&P
Chief Complaint:        Chief Complaint   Patient presents with    Follow-up       post watchman      History of Present Illness: History obtained from patient and medical record.     Kita Love is [de-identified] y.o. female with a past medical history of CAD s/p CABG, PAD, HTN, HLD, CKD, CHF, atrial fibrillation, SSS s/p PPM (11/13), and TIA. S/p Watchman (5/17/21)     -Interval history: Today, Kita Love is being seen for routine follow up. Her daughter is present for the visit. She just had a Watchman implanted on May 17th with Dr. Teofilo Boggs. She feels well since that time and denies any issues. Her groin incision site is healing nicely, no swelling/hematoma/oozing. She is in sinus rhythm on EKG today. We discussed the need for DONA on July 7th to assess for feliciano-device leak. She is eager to get off the Xarelto due to issues with anti-coagulation in the past. Her weight is stable from prior OV, no s/s of CHF. She is hopeful to get her strength and energy back so that she can start doing more yard work and things she enjoys. She recently had back surgery as well and is still recovering from that.   in Atrial fibrillation      Allergies: Allergies   Allergen Reactions    Aspirin Nausea Only    Ativan [Lorazepam] Other (See Comments)       hallucinations    Diltiazem Anaphylaxis    Sulfa Antibiotics Rash and Hives    Dabigatran Nausea Only       And indigestion  And indigestion    Aka Pradaxa    Lipitor [Atorvastatin] Other (See Comments)       Muscle pains    Mysoline [Primidone]      Nsaids      Other Other (See Comments)       Nitroglycerin patches causes severe headaches      Home Medications:        Prior to Visit Medications    Medication Sig Taking?  Authorizing Provider   allopurinol (ZYLOPRIM) 100 MG tablet TAKE 1 TABLET EVERY DAY (ALONG WITH 300MG TABLET) Yes Fito Ramirez MD   torsemide (DEMADEX) 10 MG tablet TAKE 1 TABLET EVERY OTHER DAY ALTERNATING WITH  2  TABLETS EVERY OTHER DAY Yes and ureter      Unspecified hypothyroidism           Past Surgical History:    has a past surgical history that includes back surgery; Cholecystectomy; Cardiac surgery; Coronary artery bypass graft (1987); shoulder surgery; Cardiac catheterization (7/2012); hip surgery (Left, 03/16/2017); joint replacement; Cardiac catheterization (08/07/2018); Percutaneous Transluminal Coronary Angio (11/04/2014); pr njx aa&/strd tfrml epi lumbar/sacral 1 level (Right, 12/3/2018); Femoral-femoral Bypass Graft (N/A, 8/22/2019); femoral bypass (Left, 8/22/2019); and IR KYPHOPLASTY LUMBAR 1 VERTEBRAL BODY (5/14/2021).    Social History:  Reviewed. reports that she quit smoking about 34 years ago. Her smoking use included cigarettes. She has a 28.00 pack-year smoking history. She has never used smokeless tobacco. She reports previous alcohol use. She reports that she does not use drugs.      Family History:  Reviewed. family history includes Cancer in her maternal grandmother, paternal grandmother, and sister; Diabetes in her brother and maternal uncle; Heart Disease in her brother, maternal aunt, and sister; Hypertension in her brother and paternal aunt; Stroke in her maternal aunt.      Review of System:  · Constitutional: Negative for fever, night sweats, chills, weight changes, or weakness  · Skin: Negative for rash, dry skin, pruritus, bruising, bleeding, blood clots, or changes in skin pigment  · HEENT: Negative for vision changes, ringing in the ears, sore throat, dysphagia, or swollen lymph nodes  · Respiratory: Reviewed in HPI  · Cardiovascular: Reviewed in HPI  · Gastrointestinal: Negative for abdominal pain, N/V/D, constipation, or black/tarry stools  · Genito-Urinary: Negative for dysuria, incontinence, urgency, or hematuria  · Musculoskeletal: Negative for joint swelling, muscle pain, or injuries  · Neurological/Psych: Negative for confusion, seizures, dizziness, headaches, balance issues or TIA-like symptoms.  No anxiety, depression, or insomnia     Physical Examination:      Vitals:     05/26/21 1126   BP: (!) 130/58   Pulse: 71   SpO2: 97%          Wt Readings from Last 3 Encounters:   05/26/21 125 lb 3.2 oz (56.8 kg)   05/19/21 125 lb 3.5 oz (56.8 kg)   05/14/21 125 lb (56.7 kg)      Constitutional: Cooperative and in no apparent distress, and appears stated age  Skin: Warm and pink; no pallor, cyanosis, bruising, or clubbing  HEENT: Symmetric and normocephalic. PERRL, EOM intact. Conjunctiva pink with clear sclera. Mucus membranes pink and moist. Teeth intact. Thyroid smooth without nodules or goiter  Respiratory: Respirations symmetric and unlabored. Lungs clear to auscultation bilaterally, no wheezing, rhonchi, or crackles  Cardiovascular:  IrRegular rate and rhythm. S1/S2 present without murmurs, rubs, or gallops. Peripheral pulses 2+, capillary refill < 3 seconds. No elevation of JVP. No peripheral edema  Gastrointestinal: Abdomen soft and round. Bowel sounds normoactive in all quadrants without tenderness or masses. Musculoskeletal: Bilateral upper and lower extremity strength 5/5 with full ROM. Neurological/Psych: Awake and orientated to person, place and time. Calm affect, appropriate mood.      Pertinent labs, diagnostic, device, and imaging results reviewed as a part of this visit     LABS     CBC:         Lab Results   Component Value Date     WBC 17.6 (H) 05/19/2021     HGB 13.4 05/19/2021     HCT 42.5 05/19/2021     MCV 84.2 05/19/2021      05/19/2021      BMP:         Lab Results   Component Value Date     CREATININE 1.6 (H) 05/19/2021     BUN 45 (H) 05/19/2021      05/19/2021     K 4.4 05/19/2021      05/19/2021     CO2 23 05/19/2021      Estimated Creatinine Clearance: 23 mL/min (A) (based on SCr of 1.6 mg/dL (H)).       Thyroid:         Lab Results   Component Value Date     TSH 0.90 03/25/2021     H6JWUSH 7.3 10/03/2018      Lipid Panel:         Lab Results   Component Value Date     CHOL 120 2020     HDL 29 2020     HDL 31 2012     TRIG 97 2020      LFTs:        Lab Results   Component Value Date     ALT 11 2020     AST 18 2020     ALKPHOS 80 2020     BILITOT 0.5 2020      Coags:         Lab Results   Component Value Date     PROTIME 11.7 2021     INR 1.01 2021     APTT 30.8 2021         EC2021  - NSR with PVC     Echo:   Left ventricle - normal size, thickness, reduced function with EF of 45%  LV wall motion - diffuse hypokinesis. Mitral valve - thickened, annular calcification, moderate regurgitation  Aortic valve - thickened, calcified, mild regurgitation  Tricuspid valve - mild regurgitation with PASP of 25mmHg  Pulmonic valve - trivial regurgitation  Biatrial enlargement  Pacemaker / ICD lead is visualized in the right heart.     DONA:   Ofelia Mimes is no evidence of pericardial effusion at the start of the procedure.   The left atrial appendage appears free of thrombus. Left atrial appendage ostial measurements are as follows   46 degrees 1.99cm   136 degrees 2.01cm   140 degrees 2.27cm   A 27mm Watchman device is successfully deployed into the left atrial appendage. The device appears adequately compressed. There is no evidence of leak by color Doppler.  Ofelia Mimes is no evidence of effusion upon completion of the procedure.     GXT:    Normal rest myocardial perfusion.    No fixed defect to suggest scar.    Normal perfusion c/w normal viability.      Assessment:     1. Paroxysmal Atrial Fibrillation  - S/p Watchman Implantation (21)     - Currently in Atrial fibrillation   - Continue Toprol XL 25 mg BID  - [id number] score:high; [id number] Vasc score and anticoagulation discussed. High risk for stroke and thromboembolism. Anticoagulation is recommended.  Risk of bleeding was discussed.  ~ On Xarelto 15 mg QD; will stop and switch to ASA/plavix following post Watchman DONA     - Afib risk factors including age, HTN, obesity, inactivity and UVALDO were discussed with patient. Risk factor modification recommended                            - in Atrial fibrillation      2. Sick Sinus Syndrome  - S/p Dual chamber [employer] PPM (11/13); s/p gen change (1/21)  - I reviewed device interrogation today. Device functioning normally.   ~ A-paced 46% V-paced 7%  ~ 15 years left on battery life expectancy  - Discussed with patient  - Follow up with device clinic as scheduled     3. Chronic systolic heart failure (NYHA Class III)  - Appears compensated              ~ EF 45% per echo  - Continue with Toprol XL 25 mg BID, torsemide 10 mg/20 mg daily alternating  ~ ACE-I/ARB contraindicated due to renal insufficiency and risk of hyperkalemia  - Aggressive medical therapy with risk factor modification  - Discussed with patient importance of monitoring weight, low sodium diet and fluid restriction  - Followed by CHF team     4. CAD  - Hx of CABG  - Stable  - No complaints of angina  - Continue ASA, BB, and statin              ~ No ASA due to allergy     5.  HTN  - Controlled: Goal <130/80  - Continue current medications  - Encouraged to monitor and log BP readings at home, then bring log to next visit  - Discussed importance of low sodium diet, weight control and exercise     Plan:  cardioversion

## 2021-08-17 ENCOUNTER — TELEPHONE (OUTPATIENT)
Dept: CARDIOLOGY CLINIC | Age: 81
End: 2021-08-17

## 2021-08-17 DIAGNOSIS — I50.22 CHRONIC SYSTOLIC CONGESTIVE HEART FAILURE (HCC): Primary | ICD-10-CM

## 2021-08-17 NOTE — TELEPHONE ENCOUNTER
----- Message from TIFFANIE Cohen sent at 8/17/2021  9:17 AM EDT -----  See how she is feeling since she was cardioverted yesterday  Urine is fine  Fluid level is elevated, ask her to increase her torsemide to 20 mg for 5 days and then back to 10 mg once a day to see if we can get some fluid off  Recheck labs in 2 weeks  thanks

## 2021-08-17 NOTE — TELEPHONE ENCOUNTER
----- Message from TIFFANIE Kennedy - CNS sent at 8/17/2021  9:17 AM EDT -----  See how she is feeling since she was cardioverted yesterday  Urine is fine  Fluid level is elevated, ask her to increase her torsemide to 20 mg for 5 days and then back to 10 mg once a day to see if we can get some fluid off  Recheck labs in 2 weeks  thanks

## 2021-08-17 NOTE — TELEPHONE ENCOUNTER
I  spoke with pt and relayed lab results and instructions  per NPRG. Pt verbalized understanding. Pt will test at [facility] lab orders already in [facility] [address]

## 2021-08-18 PROCEDURE — 93280 PM DEVICE PROGR EVAL DUAL: CPT | Performed by: INTERNAL MEDICINE

## 2021-08-18 NOTE — TELEPHONE ENCOUNTER
Patient daughter calling in to double check on the message that was given to her mom below. Daughter verbalized understanding.

## 2021-08-30 RX ORDER — LEVOTHYROXINE SODIUM 112 UG/1
TABLET ORAL
Qty: 90 TABLET | Refills: 0 | Status: SHIPPED | OUTPATIENT
Start: 2021-08-30 | End: 2021-12-17

## 2021-09-01 ENCOUNTER — HOSPITAL ENCOUNTER (OUTPATIENT)
Age: 81
Discharge: HOME | End: 2021-09-01
Payer: MEDICARE

## 2021-09-01 LAB
ALBUMIN SERPL-MCNC: 4 G/DL (ref 3.4–5)
ANION GAP SERPL CALCULATED.3IONS-SCNC: 14 MMOL/L (ref 3–16)
BUN BLDV-MCNC: 41 MG/DL (ref 7–20)
CALCIUM SERPL-MCNC: 9.4 MG/DL (ref 8.3–10.6)
CHLORIDE BLD-SCNC: 108 MMOL/L (ref 99–110)
CO2: 23 MMOL/L (ref 21–32)
CREAT SERPL-MCNC: 1.9 MG/DL (ref 0.6–1.2)
GFR AFRICAN AMERICAN: 31
GFR NON-AFRICAN AMERICAN: 25
GLUCOSE BLD-MCNC: 101 MG/DL (ref 70–99)
PHOSPHORUS: 4 MG/DL (ref 2.5–4.9)
POTASSIUM SERPL-SCNC: 4.6 MMOL/L (ref 3.5–5.1)
SODIUM BLD-SCNC: 145 MMOL/L (ref 136–145)

## 2021-09-01 PROCEDURE — 80069 RENAL FUNCTION PANEL: CPT

## 2021-09-01 PROCEDURE — 36415 COLL VENOUS BLD VENIPUNCTURE: CPT

## 2021-09-02 DIAGNOSIS — M15.9 PRIMARY OSTEOARTHRITIS INVOLVING MULTIPLE JOINTS: ICD-10-CM

## 2021-09-02 NOTE — TELEPHONE ENCOUNTER
HYDROcodone-acetaminophen (NORCO) 5-325 MG per tablet 120 tablet 0 6/1/2021 7/1/2021    Sig - Route:  Take 1 tablet by mouth 4 times daily as needed for Pain for up to 30 days. - Oral          [facility] in chart     Provider out of the office

## 2021-09-02 NOTE — TELEPHONE ENCOUNTER
Medication:   Requested Prescriptions     Pending Prescriptions Disp Refills    HYDROcodone-acetaminophen (NORCO) 5-325 MG per tablet 120 tablet 0     Sig: Take 1 tablet by mouth 4 times daily as needed for Pain for up to 30 days. Last Filled:      Patient Phone Number: [phone] (home) [phone] (work)    Last appt: 7/30/2021   Next appt: Visit date not found    Last OARRS:   RX Monitoring 6/1/2021   Attestation -   Periodic Controlled Substance Monitoring Possible medication side effects, risk of tolerance/dependence & alternative treatments discussed.      PDMP Monitoring:    Last PDMP Nighat Patel as Reviewed [facility]):  Review User Review Instant Review Result   Carly Green 6/1/2021  4:31 PM Reviewed PDMP [1]     Preferred Pharmacy:   [address] [place] [address], [facility] [phone] [facility] [phone]  [address] [place] [id number]  Phone: [phone] Fax: [phone]

## 2021-09-03 RX ORDER — HYDROCODONE BITARTRATE AND ACETAMINOPHEN 5; 325 MG/1; MG/1
1 TABLET ORAL 4 TIMES DAILY PRN
Qty: 120 TABLET | Refills: 0 | Status: SHIPPED | OUTPATIENT
Start: 2021-09-03 | End: 2021-11-23 | Stop reason: SDUPTHER

## 2021-09-10 ENCOUNTER — HOSPITAL ENCOUNTER (EMERGENCY)
Age: 81
Discharge: HOME | End: 2021-09-10
Attending: EMERGENCY MEDICINE
Payer: MEDICARE

## 2021-09-10 ENCOUNTER — APPOINTMENT (OUTPATIENT)
Dept: GENERAL RADIOLOGY | Age: 81
End: 2021-09-10
Payer: MEDICARE

## 2021-09-10 VITALS
OXYGEN SATURATION: 100 % | DIASTOLIC BLOOD PRESSURE: 78 MMHG | RESPIRATION RATE: 20 BRPM | BODY MASS INDEX: 22.5 KG/M2 | HEART RATE: 70 BPM | TEMPERATURE: 97.4 F | SYSTOLIC BLOOD PRESSURE: 144 MMHG | HEIGHT: 63 IN | WEIGHT: 127 LBS

## 2021-09-10 DIAGNOSIS — S70.02XA CONTUSION OF LEFT HIP, INITIAL ENCOUNTER: Primary | ICD-10-CM

## 2021-09-10 PROCEDURE — 73502 X-RAY EXAM HIP UNI 2-3 VIEWS: CPT

## 2021-09-10 PROCEDURE — 99282 EMERGENCY DEPT VISIT SF MDM: CPT

## 2021-09-10 ASSESSMENT — PAIN DESCRIPTION - LOCATION: LOCATION: HIP

## 2021-09-10 ASSESSMENT — PAIN DESCRIPTION - ORIENTATION: ORIENTATION: LEFT

## 2021-09-10 ASSESSMENT — PAIN SCALES - GENERAL: PAINLEVEL_OUTOF10: 6

## 2021-09-10 NOTE — ED PROVIDER NOTES
[address] [facility] Emergency Department      Pt Name: Gina Garcia  MRN: [id number]  [place] 1940  Date of evaluation: 9/10/2021  Provider: Kvng Mendoza MD  CHIEF COMPLAINT  Chief Complaint   Patient presents with    Fall     Pt tripped and fell approx 1 hour ago. c/o left hip pain. Has ambulated since     HPI  Gina Garcia is a [de-identified] y.o. female who presents because of a fall. She says that her sandal got caught on an edge on the floor and she tripped. She fell and landed on her left hip. She has swelling to that area. She did not hit her head or lose consciousness. She denies any other injury. She was too weak to get up off the floor by herself, but family did arrive and helped her stand up. She has been able to bear some weight. She denies any chest or abdominal pain. Denies any new numbness or tingling. REVIEW OF SYSTEMS:  LOC absent, no breathing difficulty, no abdominal pain Pertinent positives and negatives as per the HPI. All other review of systems reviewed and negative. Nursing notes reviewed.     PAST MEDICAL HISTORY  Past Medical History:   Diagnosis Date    Allergic rhinitis, cause unspecified     Arthritis     Atrial fibrillation ([address]     Bronchopneumonia     CAD (coronary artery disease)     stent:  post cataract surgery (CABG)    Cerebral artery occlusion with cerebral infarction ([address]     TIA    Chronic gouty arthropathy     Chronic kidney disease     Congestive heart failure, unspecified     Degeneration of cervical intervertebral disc     Essential and other specified forms of tremor     Gout     HIGH CHOLESTEROL     History of CVA (cerebrovascular accident)     Hx of blood clots     Hypertension     Influenza 12/23/2017    Mitral valve stenosis and aortic valve insufficiency     Movement disorder     back problems    Pacemaker     Peptic ulcer, unspecified site, unspecified as acute or chronic, without mention of hemorrhage, perforation, or obstruction  Thyroid disease     Unspecified disorder of kidney and ureter     Unspecified hypothyroidism      SURGICAL HISTORY  Past Surgical History:   Procedure Laterality Date    BACK SURGERY      CARDIAC CATHETERIZATION  7/2012    CARDIAC CATHETERIZATION  08/07/2018    Unsuccesful  of RCA    CARDIAC SURGERY      CABG & Cardiac ablation    CHOLECYSTECTOMY      CORONARY ARTERY BYPASS GRAFT  1987    LIMA- Diag/LAD, SVG- RCA    FEMORAL BYPASS Left 8/22/2019    LEFT FEMORAL TO POPLITEAL BYPASS GRAFT performed by Mely Babcock MD at [facility] FEMORAL-FEMORAL BYPASS GRAFT N/A 8/22/2019    FEMORAL TO FEMORAL BYPASS performed by Mely Babcock MD at [address] [address] Left 03/16/2017    Left hip pinning    IR KYPHOPLASTY LUMBAR FIRST LEVEL  5/14/2021    IR KYPHOPLASTY LUMBAR FIRST LEVEL 5/14/2021 [facility] PROCED[facility]    JOINT REPLACEMENT      SD NJX AA&/STRD TFRML EPI LUMBAR/SACRAL 1 LEVEL Right 12/3/2018    RIGHT L3 AND L4 LUMBAR TRANSFORAMINAL EPIRUAL STEROID INJECTION WITH FLUOROSCOPY performed by Meek Villela MD at [facility]  11/04/2014    MARLENY - 3.0 x 28 to the Ist Diag    SHOULDER SURGERY      left     MEDICATIONS:  No current facility-administered medications on file prior to encounter. Current Outpatient Medications on File Prior to Encounter   Medication Sig Dispense Refill    HYDROcodone-acetaminophen (NORCO) 5-325 MG per tablet Take 1 tablet by mouth 4 times daily as needed for Pain for up to 30 days. 120 tablet 0    levothyroxine (SYNTHROID) 112 MCG tablet TAKE 1 TABLET EVERY DAY 90 tablet 0    acetaminophen (TYLENOL) 325 MG tablet Take 650 mg by mouth every 4 hours as needed      doxepin (SINEQUAN) 10 MG capsule Take 1 capsule by mouth nightly 30 capsule 3    ammonium lactate (LAC-HYDRIN) 12 % lotion Apply topically as needed.  225 g 5    clopidogrel (PLAVIX) 75 MG tablet Take 1 tablet by mouth daily 90 tablet 3    betamethasone valerate (VALISONE) 0.1 % cream Apply topically 2 times daily.  30 g 5    metoprolol succinate (TOPROL XL) 25 MG extended release tablet TAKE 1 TABLET TWICE DAILY 180 tablet 0    topiramate (TOPAMAX) 50 MG tablet TAKE 2 TABLETS TWICE DAILY 360 tablet 0    ipratropium (ATROVENT) 0.06 % nasal spray 1 spray by Each Nostril route 4 times daily 1 Bottle 3    vitamin D (ERGOCALCIFEROL) 1.25 MG (53729 UT) CAPS capsule Take 1 capsule by mouth once a week 12 capsule 0    allopurinol (ZYLOPRIM) 100 MG tablet TAKE 1 TABLET EVERY DAY (ALONG WITH 300MG TABLET) 90 tablet 1    torsemide (DEMADEX) 10 MG tablet TAKE 1 TABLET EVERY OTHER DAY ALTERNATING WITH  2  TABLETS EVERY OTHER DAY (Patient taking differently: Take 20 mg by mouth daily TAKE 1 TABLET EVERY OTHER DAY ALTERNATING WITH  2  TABLETS EVERY OTHER DAY    PATIENT TAKING 20 MG DAILY FOR 5 DAYS THEN RETURN TO ALTERNATE) 135 tablet 1    aspirin EC 81 MG EC tablet Take 1 tablet by mouth daily 30 tablet 5    nitroGLYCERIN (NITROLINGUAL) 0.4 MG/SPRAY 0.4 mg spray Place 1 spray under the tongue every 5 minutes as needed for Chest pain 1 Bottle 3     ALLERGIES  Aspirin, Diltiazem, Diltiazem hcl, Lorazepam, Sulfa antibiotics, Atorvastatin, Dabigatran, Mysoline [primidone], Nsaids, Other, and Primidone  FAMILY HISTORY:  Not relevant    SOCIAL HISTORY:  Social History     Tobacco Use    Smoking status: Former Smoker     Packs/day: 1.00     Years: 28.00     Pack years: 28.00     Types: Cigarettes     Quit date: 1987     Years since quittin.7    Smokeless tobacco: Never Used    Tobacco comment: H.O.smoking at age 15 / smoked up to 1 p.p.d / quit    Vaping Use    Vaping Use: Never used   Substance Use Topics    Alcohol use: Not Currently     Alcohol/week: 0.0 standard drinks    Drug use: No     IMMUNIZATIONS:   Immunization History   Administered Date(s) Administered    COVID-19, Moderna, PF, 100mcg/0.5mL 2021, 2021    Influenza 2012, 10/02/2013    Influenza A ([id number]) Vaccine IM 12/21/2009    Influenza Virus Vaccine 10/03/2005, 10/12/2007, 09/28/2012, 10/04/2013, 10/20/2014, 10/05/2015    Influenza Whole 09/14/2011    Influenza, High Dose (Fluzone 65 yrs and older) 10/10/2014, 09/30/2015, 11/02/2016, 10/24/2017, 09/17/2018    Influenza, Shelia Hamburger, Recombinant, IM PF (Flublok 18 yrs and older) 10/12/2020    Influenza, Triv, inactivated, subunit, adjuvanted, IM (Fluad 65 yrs and older) 09/30/2019    Pneumococcal Conjugate 13-valent ([id number]) 12/04/2015    Pneumococcal Conjugate 7-valent (Prevnar7) 12/03/2009    Pneumococcal Polysaccharide ([id number]) 09/28/2000, 01/28/2008    Tdap (Boostrix, Adacel) 08/17/2011, 05/12/2020       PHYSICAL EXAM  VITAL SIGNS:  Blood pressure (!) 144/78, pulse 70, temperature 97.4 °F (36.3 °C), temperature source Oral, resp. rate 20, height 5' 3\" (1.6 m), weight 127 lb (57.6 kg), SpO2 100 %, not currently breastfeeding. Constitutional:  [de-identified] y.o. female nontoxic  HENT:  Mucous membranes moist, atraumatic  Eyes:   Conjunctiva clear, no icterus  Neck:  Supple, trachea midline, no visible JVD  Cardiovascular:  Regular, no rubs  Thorax & Lungs:  No accessory muscle usage, no crepitation, clear  Abdomen:  Soft, non distended, bowel sounds present, no tenderness  Back:  No new deformity, no midline step off, no tenderness  Genitalia:  Deferred  Rectal:  Deferred  Extremities:  No cyanosis, distally NVI, left hip tenderness with soft tissue swelling and bruising consistent with hematoma  Skin:  Warm, dry  Neurologic:  Alert, no slurred speech, PERRL, coordination of extremities normal without deficit  Psychiatric:  Affect appropriate    DIAGNOSTIC RESULTS:   RADIOLOGY:    Plain x-rays were viewed by me:   XR HIP 2-3 VW W PELVIS LEFT   Final Result   No acute osseous injury. Left femoral neck fixation screws remain in normal position. Mild   degenerative disease of the left hip.            ED COURSE:      PROCEDURES:  None    CRITICAL CARE:  None    CONSULTATIONS:  None    MEDICAL DECISION MAKING: Jaz Mercado is a [de-identified] y.o. female who presented because of a fall. Images are negative for acute fracture and we have provided soft tissue pain instructions. No suspicion for occult neurovascular injury or compartment syndrome. Jaz Mercado was given appropriate discharge instructions. Referral to follow up provider. Differential Diagnosis:  Fracture, sprain, concussion, syncope, CVA, other    FOLLOW UP:    Hui Tello MD  [facility]  [phone]    Schedule an appointment as soon as possible for a visit       FINAL IMPRESSION:    1. Contusion of left hip, initial encounter        (Please note that I used voice recognition software to generate this note.   Occasionally words are mistranscribed despite my efforts to edit errors.)        Mariela Gregorio MD  09/11/21 1661

## 2021-09-14 ENCOUNTER — PROCEDURE VISIT (OUTPATIENT)
Dept: FAMILY MEDICINE CLINIC | Age: 81
End: 2021-09-14
Payer: MEDICARE

## 2021-09-14 VITALS
SYSTOLIC BLOOD PRESSURE: 118 MMHG | HEART RATE: 69 BPM | TEMPERATURE: 97.5 F | DIASTOLIC BLOOD PRESSURE: 78 MMHG | OXYGEN SATURATION: 98 %

## 2021-09-14 DIAGNOSIS — L57.0 ACTINIC KERATOSIS: Primary | ICD-10-CM

## 2021-09-14 DIAGNOSIS — M65.9 TENOSYNOVITIS OF RIGHT FOOT: ICD-10-CM

## 2021-09-14 PROCEDURE — 4040F PNEUMOC VAC/ADMIN/RCVD: CPT | Performed by: FAMILY MEDICINE

## 2021-09-14 PROCEDURE — G8428 CUR MEDS NOT DOCUMENT: HCPCS | Performed by: FAMILY MEDICINE

## 2021-09-14 PROCEDURE — 1090F PRES/ABSN URINE INCON ASSESS: CPT | Performed by: FAMILY MEDICINE

## 2021-09-14 PROCEDURE — 1123F ACP DISCUSS/DSCN MKR DOCD: CPT | Performed by: FAMILY MEDICINE

## 2021-09-14 PROCEDURE — 1036F TOBACCO NON-USER: CPT | Performed by: FAMILY MEDICINE

## 2021-09-14 PROCEDURE — 99213 OFFICE O/P EST LOW 20 MIN: CPT | Performed by: FAMILY MEDICINE

## 2021-09-14 PROCEDURE — G8420 CALC BMI NORM PARAMETERS: HCPCS | Performed by: FAMILY MEDICINE

## 2021-09-14 PROCEDURE — 17000 DESTRUCT PREMALG LESION: CPT | Performed by: FAMILY MEDICINE

## 2021-09-14 PROCEDURE — G8399 PT W/DXA RESULTS DOCUMENT: HCPCS | Performed by: FAMILY MEDICINE

## 2021-09-14 RX ORDER — PREDNISONE 10 MG/1
TABLET ORAL
Qty: 15 TABLET | Refills: 0 | Status: SHIPPED | OUTPATIENT
Start: 2021-09-14 | End: 2021-10-26

## 2021-09-14 NOTE — PROGRESS NOTES
Subjective:      Patient ID: Heather Galvin is a [de-identified] y.o. female. CC: Patient presents for acute medical problem-right foot pain and skin lesion on the right side of her face. Medical assistant notes reviewed. HPI Patient presents with a skin lesion on her right side of her face. Patient states this area has been growing but part of it fell off a week ago with some bleeding. Also her right 5th toe is burning a lot. She thinks that her gout is flared up. Patient is taking Allopurinol 100 mg daily. Patient's allopurinol dosage was decreased down per nephrology as she does have a history of gout. She is having increasing pain and discomfort in the foot with walking and activities. Review of Systems     Allergies   Allergen Reactions    Aspirin Nausea Only    Diltiazem Anaphylaxis    Diltiazem Hcl Anaphylaxis    Lorazepam Other (See Comments)     hallucinations  hallucinations  hallucinations    Sulfa Antibiotics Rash and Hives    Atorvastatin Other (See Comments)     Muscle pains  Muscle pains  Muscle pains    Dabigatran Nausea Only     And indigestion  And indigestion    Aka Pradaxa  And indigestion  And indigestion  And indigestion    Aka Pradaxa  And indigestion  And indigestion  And indigestion    Aka Pradaxa    Mysoline [Primidone]     Nsaids      Other reaction(s): Unknown    Other Other (See Comments)     Nitroglycerin patches causes severe headaches    Primidone      Other reaction(s): Unknown       Objective:   Physical Exam  Vitals and nursing note reviewed. Constitutional:       General: She is not in acute distress. Appearance: She is well-developed. Musculoskeletal:      Right lower leg: No swelling or tenderness. Right foot: Normal range of motion and normal capillary refill. Swelling and tenderness (Tenderness on the lateral aspect of her foot just below the ankle out to the toe. There is no tenderness at the toe site itself. Full range of motion of toes. ) present. Normal pulse. Skin:     General: Skin is warm. Findings: Lesion present. No rash. Comments: Right cheek with actinic keratosis and partial avulsion   Neurological:      Mental Status: She is alert. Psychiatric:         Behavior: Behavior is cooperative. Assessment:      Bhupendra Vaca was seen today for skin lesion. Diagnoses and all orders for this visit:    Actinic keratosis  -     AL DESTRUC PREMALIGNANT, FIRST LESION    Tenosynovitis of right foot    Other orders  -     predniSONE (DELTASONE) 10 MG tablet; 1 TID for 3 day then 1 BID            Plan:      Advised patient and daughter that her foot pain was more consistent with tenosynovitis than gout. Recommend she maintain the allopurinol at 100 mg daily especially with her kidney stress and do a prednisone burst.  With her kidney stress she is not a candidate for anti-inflammatory medications. Patient wishes to proceed with cryotherapy of the skin lesion on her face. RTC 1 month for regular appointment time    Medical decision making of low complexity      Please note that this chart was generated using Dragon dictation software. Although every effort was made to ensure the accuracy of this automated transcription, some errors in transcription may have occurred.

## 2021-09-20 RX ORDER — ERGOCALCIFEROL 1.25 MG/1
50000 CAPSULE ORAL WEEKLY
Qty: 12 CAPSULE | Refills: 1 | Status: SHIPPED | OUTPATIENT
Start: 2021-09-20 | End: 2022-02-28

## 2021-09-20 NOTE — TELEPHONE ENCOUNTER
PT is requesting a refill on vitamin D (ERGOCALCIFEROL) 1.25 MG (91172 UT) CAPS capsule to be called into [address] [place] [address] [phone]

## 2021-09-20 NOTE — TELEPHONE ENCOUNTER
Medication:   Requested Prescriptions     Pending Prescriptions Disp Refills    vitamin D (ERGOCALCIFEROL) 1.25 MG (21775 UT) CAPS capsule 12 capsule 1     Sig: Take 1 capsule by mouth once a week      Last Filled:      Patient Phone Number: [phone] (home) [phone] (work)    Last appt: 9/14/2021   Next appt: 11/2/2021    Last OARRS:   RX Monitoring 6/1/2021   Attestation -   Periodic Controlled Substance Monitoring Possible medication side effects, risk of tolerance/dependence & alternative treatments discussed.      PDMP Monitoring:    Last PDMP Ricarda Samples as Reviewed [facility]):  Review User Review Instant Review Result   Luis Archer 6/1/2021  4:31 PM Reviewed PDMP [1]     Preferred Pharmacy:   [facility] [address] [id number] [place] Number [phone]  [facility] [address]. [address]  Phone: [phone] Fax: [phone]    [address] [facility] - [facility] [phone] - F [phone]  [address] [place] [address]  Phone: [phone] Fax: [phone] [facility] [address] [facility] Petr Kingsley [address] [place] [address] [phone] [place] Number [phone]  [address] [facility] [address]  Phone: [phone] Fax: [phone]

## 2021-09-21 RX ORDER — ERGOCALCIFEROL 1.25 MG/1
50000 CAPSULE ORAL WEEKLY
Qty: 12 CAPSULE | Refills: 0 | Status: SHIPPED | OUTPATIENT
Start: 2021-09-21 | End: 2021-10-26

## 2021-10-20 LAB
ALBUMIN SERPL-MCNC: 3.7 G/DL (ref 3.5–5.7)
ANION GAP 4: 9 MMOL/L (ref 7–16)
BUN BLDV-MCNC: 41 MG/DL (ref 7–25)
CALCIUM SERPL-MCNC: 9 MG/DL (ref 8.6–10.2)
CHLORIDE BLD-SCNC: 113 MMOL/L (ref 98–107)
CO2: 23 MMOL/L (ref 21–31)
CREATININE + EGFR PANEL: 1.5 MG/DL (ref 0.6–1.2)
CREATININE URINE: 70.19 MG/DL
GFR CALCULATED: 33 ML/MIN/1.73M2
GFR CALCULATED: 40 ML/MIN/1.73M2
GLUCOSE: 85 MG/DL (ref 74–109)
HEMOGLOBIN URINE, QUAL: 13.7 G/DL (ref 12.1–15.8)
MCH RBC QN AUTO: 25.6 PG (ref 28.4–33.4)
MCHC RBC AUTO-ENTMCNC: 31.5 G/DL (ref 31.1–37)
MCV RBC AUTO: 81.2 FL (ref 85–99)
PARATHYROID HORMONE INTACT: 103.5 PG/ML (ref 12–88)
PDW BLD-RTO: 20.5 % (ref 11.7–15.2)
PHOSPHORUS: 3.2 MG/DL (ref 2.5–5)
PLATELET COUNT MANUAL: 366 K/UL (ref 154–393)
POTASSIUM SERPL-SCNC: 4.1 MMOL/L (ref 3.5–5.3)
PROTEIN UA: 13 MG/DL
PROTEIN/CREAT RATIO URINE RAN: 0.2 MG/DL (ref 0–0.1)
RBC # BLD: 5.35 M/UL (ref 3.86–5.17)
SODIUM BLD-SCNC: 145 MMOL/L (ref 136–145)
SR HEMATOCRIT: 43.4 % (ref 35.8–46.5)
VITAMIN D 25-HYDROXY: 48.8 NG/ML
WBC BLOOD, MANUAL: 10 K/UL (ref 4–10.5)

## 2021-10-26 ENCOUNTER — OFFICE VISIT (OUTPATIENT)
Dept: CARDIOLOGY CLINIC | Age: 81
End: 2021-10-26
Payer: MEDICARE

## 2021-10-26 VITALS
SYSTOLIC BLOOD PRESSURE: 100 MMHG | HEART RATE: 73 BPM | BODY MASS INDEX: 23.21 KG/M2 | WEIGHT: 131 LBS | DIASTOLIC BLOOD PRESSURE: 70 MMHG | HEIGHT: 63 IN | OXYGEN SATURATION: 98 %

## 2021-10-26 DIAGNOSIS — I48.20 CHRONIC ATRIAL FIBRILLATION (HCC): ICD-10-CM

## 2021-10-26 DIAGNOSIS — I50.22 CHRONIC SYSTOLIC CONGESTIVE HEART FAILURE (HCC): Primary | ICD-10-CM

## 2021-10-26 DIAGNOSIS — N28.9 RENAL INSUFFICIENCY: ICD-10-CM

## 2021-10-26 DIAGNOSIS — I25.810 CORONARY ARTERY DISEASE INVOLVING AUTOLOGOUS ARTERY CORONARY BYPASS GRAFT WITHOUT ANGINA PECTORIS: ICD-10-CM

## 2021-10-26 PROCEDURE — G8484 FLU IMMUNIZE NO ADMIN: HCPCS | Performed by: CLINICAL NURSE SPECIALIST

## 2021-10-26 PROCEDURE — 1090F PRES/ABSN URINE INCON ASSESS: CPT | Performed by: CLINICAL NURSE SPECIALIST

## 2021-10-26 PROCEDURE — G8427 DOCREV CUR MEDS BY ELIG CLIN: HCPCS | Performed by: CLINICAL NURSE SPECIALIST

## 2021-10-26 PROCEDURE — G8420 CALC BMI NORM PARAMETERS: HCPCS | Performed by: CLINICAL NURSE SPECIALIST

## 2021-10-26 PROCEDURE — 1036F TOBACCO NON-USER: CPT | Performed by: CLINICAL NURSE SPECIALIST

## 2021-10-26 PROCEDURE — 1123F ACP DISCUSS/DSCN MKR DOCD: CPT | Performed by: CLINICAL NURSE SPECIALIST

## 2021-10-26 PROCEDURE — 99214 OFFICE O/P EST MOD 30 MIN: CPT | Performed by: CLINICAL NURSE SPECIALIST

## 2021-10-26 PROCEDURE — G8399 PT W/DXA RESULTS DOCUMENT: HCPCS | Performed by: CLINICAL NURSE SPECIALIST

## 2021-10-26 PROCEDURE — 4040F PNEUMOC VAC/ADMIN/RCVD: CPT | Performed by: CLINICAL NURSE SPECIALIST

## 2021-10-26 NOTE — PROGRESS NOTES
[address]  Progress Note    Primary Care Doctor:  Rommel Wilcox MD    Chief Complaint   Patient presents with    Follow-up    Congestive Heart Failure        History of Present Illness:  80 y.o. female with history of Ms. Devin Parsons She has a history of atrial fibrillation (on xarelto), TIA, chronic kidney disease, CHF, hypertension, hyperlipidemia, mitral valve disease, hypothyroidism. Darcia Second last LVEF was 45% on 7/19/19. CABG, 7/08 cath- patent LIMA to LAD, SVG occluded to RCA; 8/2019 left to right fem-fem bypass and left fem to above knee popliteal bypass  Pacemaker generator change 1/25/21  Watchman implanted 5/17/2021    I had the pleasure of seeing Christiano Nevarez in follow up for systolic heart failure. She is ambulatory and by her self today (daughter in car). She had a CV done 8/16 and feels so much better. She denies any lightheadedness, shortness of breath, chest pain or palpitations. She has gained weight as she is feeling better. She saw nephrology and labs much improved. She is off losartan and aldactone. Past Medical History:   has a past medical history of Allergic rhinitis, cause unspecified, Arthritis, Atrial fibrillation ([address] Bronchopneumonia, CAD (coronary artery disease), Cerebral artery occlusion with cerebral infarction ([address] Chronic gouty arthropathy, Chronic kidney disease, Congestive heart failure, unspecified, Degeneration of cervical intervertebral disc, Essential and other specified forms of tremor, Gout, HIGH CHOLESTEROL, History of CVA (cerebrovascular accident), Hx of blood clots, Hypertension, Influenza, Mitral valve stenosis and aortic valve insufficiency, Movement disorder, Pacemaker, Peptic ulcer, unspecified site, unspecified as acute or chronic, without mention of hemorrhage, perforation, or obstruction, Thyroid disease, Unspecified disorder of kidney and ureter, and Unspecified hypothyroidism.   Surgical History:   has a past surgical history that Yes Lesia Bright MD   betamethasone valerate (VALISONE) 0.1 % cream Apply topically 2 times daily. 6/29/21  Yes Manford Denver, MD   topiramate (TOPAMAX) 50 MG tablet TAKE 2 TABLETS TWICE DAILY 6/4/21  Yes Manford Denver, MD   allopurinol (ZYLOPRIM) 100 MG tablet TAKE 1 TABLET EVERY DAY (ALONG WITH 300MG TABLET) 5/25/21  Yes Manford Denver, MD   torsemide (DEMADEX) 10 MG tablet TAKE 1 TABLET EVERY OTHER DAY ALTERNATING WITH  2  TABLETS EVERY OTHER DAY  Patient taking differently: Take 20 mg by mouth daily TAKE 1 TABLET EVERY OTHER DAY ALTERNATING WITH  2  TABLETS EVERY OTHER DAY    PATIENT TAKING 20 MG DAILY FOR 5 DAYS THEN RETURN TO ALTERNATE 5/25/21  Yes Manford Denver, MD   aspirin EC 81 MG EC tablet Take 1 tablet by mouth daily 5/17/21  Yes TIFFANIE Silva - CNP   nitroGLYCERIN (NITROLINGUAL) 0.4 MG/SPRAY 0.4 mg spray Place 1 spray under the tongue every 5 minutes as needed for Chest pain 2/4/21  Yes Ming Nunez MD   ipratropium (ATROVENT) 0.06 % nasal spray 1 spray by Each Nostril route 4 times daily  Patient not taking: Reported on 10/26/2021 6/1/21   Manford Denver, MD        Allergies:  Aspirin, Diltiazem, Diltiazem hcl, Lorazepam, Sulfa antibiotics, Atorvastatin, Dabigatran, Mysoline [primidone], Nsaids, Other, and Primidone     Review of Systems:   · Constitutional: there has been no unanticipated weight loss. There's been no change in energy level, sleep pattern, or activity level. · Eyes: No visual changes or diplopia. No scleral icterus. · ENT: No Headaches, hearing loss or vertigo. No mouth sores or sore throat. · Cardiovascular: Reviewed in HPI  · Respiratory: No cough or wheezing, no sputum production. No hematemesis. · Gastrointestinal: No abdominal pain, appetite loss, blood in stools. No change in bowel or bladder habits. States she feels bloated   · Genitourinary: No dysuria, trouble voiding, or hematuria.   · Musculoskeletal:  No gait disturbance, weakness or joint complaints. · Integumentary: No rash or pruritis. · Neurological: No headache, diplopia, change in muscle strength, numbness or tingling. No change in gait, balance, coordination, mood, affect, memory, mentation, behavior. · Psychiatric: No anxiety, no depression. · Endocrine: No malaise, fatigue or temperature intolerance. No excessive thirst, fluid intake, or urination. No tremor. · Hematologic/Lymphatic: No abnormal bruising or bleeding, blood clots or swollen lymph nodes. · Allergic/Immunologic: No nasal congestion or hives. Physical Examination:    Vitals:    10/26/21 1403   BP: 100/70   Site: Left Upper Arm   Position: Sitting   Cuff Size: Medium Adult   Pulse: 73   SpO2: 98%   Weight: 131 lb (59.4 kg)   Height: 5' 3\" (1.6 m)        Constitutional and General Appearance: Warm and dry, no apparent distress, normal coloration  HEENT:  Normocephalic, atraumatic  Respiratory:  · Normal excursion and expansion without use of accessory muscles  · Resp Auscultation: Normal breath sounds without dullness  Cardiovascular:  · The apical impulses not displaced  · Heart tones are crisp and normal  · JVP normal H2O  · regular rhythm  · Peripheral pulses are symmetrical and full  · There is no clubbing, cyanosis of the extremities.   · No ankle edema  · Pedal Pulses: 2+ and equal   Abdomen:  · No masses or tenderness  · Liver/Spleen: No Abnormalities Noted  Neurological/Psychiatric:  · Alert and oriented in all spheres  · Moves all extremities well  · Exhibits normal gait balance and coordination  · No abnormalities of mood, affect, memory, mentation, or behavior are noted    Lab Data:    CBC:   Lab Results   Component Value Date    WBC 11.9 08/02/2021    WBC 17.6 05/19/2021    WBC 18.9 05/18/2021    RBC 5.35 10/20/2021    RBC 5.36 08/02/2021    RBC 5.05 05/19/2021    HGB 14.1 08/02/2021    HGB 13.4 05/19/2021    HGB 13.5 05/18/2021    HCT 45.4 08/02/2021    HCT 42.5 05/19/2021    HCT 42.4 05/18/2021    MCV 81.2 10/20/2021    MCV 84.7 08/02/2021    MCV 84.2 05/19/2021    RDW 20.5 10/20/2021    RDW 19.3 08/02/2021    RDW 23.1 05/19/2021     08/02/2021     05/19/2021     05/18/2021     BMP:  Lab Results   Component Value Date     10/20/2021     09/01/2021     08/16/2021    K 4.1 10/20/2021    K 4.6 09/01/2021    K 5.1 08/16/2021    K 4.9 12/16/2020    K 4.2 11/07/2020    K 4.1 03/25/2020     10/20/2021     09/01/2021     08/16/2021    CO2 23 10/20/2021    CO2 23 09/01/2021    CO2 22 08/16/2021    PHOS 3.2 10/20/2021    PHOS 4.0 09/01/2021    PHOS 4.8 08/02/2021    BUN 41 10/20/2021    BUN 41 09/01/2021    BUN 55 08/16/2021    CREATININE 1.9 09/01/2021    CREATININE 1.7 08/16/2021    CREATININE 2.0 08/02/2021     BNP:   Lab Results   Component Value Date    PROBNP 6,300 08/16/2021    PROBNP 2,071 08/02/2021    PROBNP 2,170 11/07/2020     Cardiac Imaging:  [facility] 7/7/2021  Summary   Ejection fraction is visually estimated to be 45-50 %. Mild thickening of anterior, posterior leaflet of mitral valve. There is decreased leaflet motion and \"hockey stick\" appearance of the   mitral leaflets. posterior leaflet is immobile. Mean Gradient 4 mmHg. Moderate mitral regurgitation is present. There is no evidence of mass or thrombus in the left atrium or appendage. Watchman in place. No thrombus. <3 mm leak. The left atrium is dilated. A PFO is present. Aortic valve leaflets appear thickened. Tricuspid aortic valve. Mild aortic regurgitation is present. Plaque is noted in the thoracic aorta. Moderate tricuspid regurgitation.  PASP 51 mmHg    [facility] 3/19/2021 Dr Areta [place]  Artery Findings/Result   LM Normal   LAD 50% ostial, 50% mid instent   Cx 50% mid at OM1 bifurcation, 30% OM1 mid at bifurcation   RI NA   % prox with multiple R-R collaterals   LVEDP 8   LVG NA due to renal failure      Intervention:         None     Post Cath Dx:       Stable CAD  Possible angina from  of RCA - poor candidate for  intervention with renal failure    Echo 4/30/2020   Left ventricular cavity size is normal. Normal left ventricular wall   thickness. Left ventricular function appears to be reduced with an estimated   ejection fraction of 45%. Diffuse hypokinesis. Echo 7/19/2019   Summary    Left ventricle - normal size, thickness, reduced function with EF of 45%  LV wall motion - diffuse hypokinesis. Mitral valve - thickened, annular calcification, moderate regurgitation  Aortic valve - thickened, calcified, mild regurgitation  Tricuspid valve - mild regurgitation with PASP of 25mmHg  Pulmonic valve - trivial regurgitation   Biatrial enlargement  Pacemaker / ICD lead is visualized in the right heart. 6/2019 Dr Starr Santiago  NSTEMI: . Angiogram findings were as below:      Findings:                      LM       Normal              LAD     50% ostial, mid 100%              Cx        40% OM1 at bifurcation              RCA     Mid 100%              LVG     Not performed              EDP     15 mmHg              L-LAD  Patent              S-PDA Known occluded  Severe Ca++:None  Post Cath Dx:Stable CAD, no apparent culprit for NSTEMI  Intervention:  None  Med Rec:        Continue aggressive med tx                          Continue plavix, no asa due to allergy. statin added. LDL 75   8/7/18  The PCI of complex proximal RCA chronic total occlusion was unsuccessful (J- score 3). Underwent successful Angioplasty of high-grade proximal RCA lesion proximal to the . RECOMMENDATIONS:  Attempt on this complex long  was unsuccessful. Lack   Of retrograde collaterals along with a very ambiguous cap as well as a large RV marginal branch and bridging collateral coming off at the cap makes it a  challenging repeat attempt. If she continues to have angina, it is recommended to proceed with the single-vessel SVG to the RCA.      Echo: 2/17/16 ([facility])  Conclusions: Normal LV size and systolic function Mild to moderate mitral  regurgitation Mild tricuspid regurgitation  Findings:   Left Atrium: Mild to moderate enlargement of the left atrium. Left Ventricle: Upper limits of normal left ventricle size. No left ventricular  hypertrophy. Normal left ventricular systolic function. The ejection fraction   Is visually estimated to be 55 %. Right Atrium: Normal right atrial size. RightVentricle: Normal right ventricle size. Normal right ventricular function. Aortic Valve: Tri-leaflet aortic valve. Mild aortic cusp calcification. No  aortic valve stenosis. Mild (1+) aortic valve regurgitation. Aorta: No dilatation of the aortic root. IVC/PA: Normal IVC size and normal respiratory  collapse consistent with normal right atrial pressure. Mitral Valve: Mild mitral valve leaflet calcification. Mild to moderate (2+) mitral valve  regurgitation. No mitral valve stenosis. Tricuspid Valve: Mild (1+) tricuspid  regurgitation. The pulmonary artery pressure is normal.   Pulmonic Valve: Mild  pulmonic regurgitation. Pericardium: Normal pericardium with no significant  pericardial effusion. Assessment:    1. Chronic systolic heart failure (HCC) on bb, no ace/arb/aldosterone antagonist due to hypotension   2. Chronic atrial fibrillation s/p watchman Dr Xochilt Perez   3. Coronary artery disease involving autologous artery coronary bypass graft without angina pectoris    4. Renal insufficiency follows with Dr Vega Pack:   1. Continue all current medications  2. Labs as ordered in December  3. RTO in 4-5 months    She remains of losartan and aldactone due to hypotension      I appreciate the opportunity of cooperating in the care of this individual.    TIFFANIE Dahl - CNS, CNS, 10/26/2021, 2:44 PM    QUALITY MEASURES  1. Tobacco Cessation Counseling: NA  2. Retake of BP if >140/90:   NA  3.  Documentation to PCP/referring for new patient:  Sent to PCP at close of office

## 2021-11-02 ENCOUNTER — OFFICE VISIT (OUTPATIENT)
Dept: FAMILY MEDICINE CLINIC | Age: 81
End: 2021-11-02
Payer: MEDICARE

## 2021-11-02 VITALS
DIASTOLIC BLOOD PRESSURE: 62 MMHG | OXYGEN SATURATION: 98 % | SYSTOLIC BLOOD PRESSURE: 118 MMHG | TEMPERATURE: 96.8 F | WEIGHT: 130 LBS | BODY MASS INDEX: 23.03 KG/M2 | HEART RATE: 69 BPM

## 2021-11-02 DIAGNOSIS — I73.9 PAD (PERIPHERAL ARTERY DISEASE) (HCC): ICD-10-CM

## 2021-11-02 DIAGNOSIS — I50.23 ACUTE ON CHRONIC SYSTOLIC (CONGESTIVE) HEART FAILURE (HCC): Primary | ICD-10-CM

## 2021-11-02 DIAGNOSIS — M1A.00X0 CHRONIC GOUTY ARTHROPATHY: ICD-10-CM

## 2021-11-02 DIAGNOSIS — R29.6 RECURRENT FALLS WHILE WALKING: ICD-10-CM

## 2021-11-02 DIAGNOSIS — N18.30 STAGE 3 CHRONIC KIDNEY DISEASE, UNSPECIFIED WHETHER STAGE 3A OR 3B CKD (HCC): ICD-10-CM

## 2021-11-02 DIAGNOSIS — Z23 NEED FOR INFLUENZA VACCINATION: ICD-10-CM

## 2021-11-02 PROCEDURE — 1090F PRES/ABSN URINE INCON ASSESS: CPT | Performed by: FAMILY MEDICINE

## 2021-11-02 PROCEDURE — G0008 ADMIN INFLUENZA VIRUS VAC: HCPCS | Performed by: FAMILY MEDICINE

## 2021-11-02 PROCEDURE — G8484 FLU IMMUNIZE NO ADMIN: HCPCS | Performed by: FAMILY MEDICINE

## 2021-11-02 PROCEDURE — 1036F TOBACCO NON-USER: CPT | Performed by: FAMILY MEDICINE

## 2021-11-02 PROCEDURE — G8420 CALC BMI NORM PARAMETERS: HCPCS | Performed by: FAMILY MEDICINE

## 2021-11-02 PROCEDURE — G8427 DOCREV CUR MEDS BY ELIG CLIN: HCPCS | Performed by: FAMILY MEDICINE

## 2021-11-02 PROCEDURE — 99214 OFFICE O/P EST MOD 30 MIN: CPT | Performed by: FAMILY MEDICINE

## 2021-11-02 PROCEDURE — 90694 VACC AIIV4 NO PRSRV 0.5ML IM: CPT | Performed by: FAMILY MEDICINE

## 2021-11-02 PROCEDURE — 4040F PNEUMOC VAC/ADMIN/RCVD: CPT | Performed by: FAMILY MEDICINE

## 2021-11-02 PROCEDURE — 1123F ACP DISCUSS/DSCN MKR DOCD: CPT | Performed by: FAMILY MEDICINE

## 2021-11-02 PROCEDURE — G8399 PT W/DXA RESULTS DOCUMENT: HCPCS | Performed by: FAMILY MEDICINE

## 2021-11-02 NOTE — PROGRESS NOTES
Subjective:      Patient ID: Rufino Ceballos is a 80 y.o. female. CC: Patient presents for re-evaluation of chronic health problems including congestive heart failure, recurrent falls, peripheral tear disease, gouty arthritis and chronic kidney disease. Dulce DUNCAN Patient presents today for a follow-up on chronic medications and medical conditions. Since last appointment time patient has been evaluated by nephrology and medications have been decreased and she is no longer having any near syncope episodes and she feels much better in general.  She not had any falls. She still having some discomfort on the lateral aspect of her right foot and intermittently into her left hand. She denies any swelling or inflammation of these areas. Patient is very compliant with medications with no adverse reactions.     Review of Systems     Patient Active Problem List   Diagnosis    Mixed hyperlipidemia    Chronic gouty arthropathy    Acquired hypothyroidism    Arthritis    Non-rheumatic mitral regurgitation    COPD (chronic obstructive pulmonary disease) ([facility])    Restrictive lung disease    Sick sinus syndrome ([facility])    Allergic rhinitis    Fibrocystic breast    Cerebrovascular accident (CVA) ([address]    HTN (hypertension), benign    Pacemaker    Primary osteoarthritis involving multiple joints    Vitamin D deficiency    Tremor    Chronic total occlusion of native coronary artery    Age related osteoporosis    Acute on chronic systolic (congestive) heart failure ([facility])    Stage 3 chronic kidney disease ([address]    PAD (peripheral artery disease) ([address]    Atherosclerosis of native arteries of extremities with intermittent claudication, bilateral legs ([facility])    Idiopathic peripheral neuropathy    Ischemic cardiomyopathy    Dizziness    Peripheral vertigo, bilateral    PAF (paroxysmal atrial fibrillation) ([facility])    Dyslipidemia    Heart failure ([facility])    Iron deficiency anemia    Anemia    Bilateral sensorineural Atorvastatin Other (See Comments)     Muscle pains  Muscle pains  Muscle pains    Dabigatran Nausea Only     And indigestion  And indigestion    Aka Pradaxa  And indigestion  And indigestion  And indigestion    Aka Pradaxa  And indigestion  And indigestion  And indigestion    Aka Pradaxa    Mysoline [Primidone]     Nsaids      Other reaction(s): Unknown    Other Other (See Comments)     Nitroglycerin patches causes severe headaches    Primidone      Other reaction(s): Unknown       Social History     Tobacco Use    Smoking status: Former Smoker     Packs/day: 1.00     Years: 28.00     Pack years: 28.00     Types: Cigarettes     Quit date: 1987     Years since quittin.8    Smokeless tobacco: Never Used    Tobacco comment: H.O.smoking at age 15 / smoked up to 1 p.p.d / quit    Substance Use Topics    Alcohol use: Not Currently     Alcohol/week: 0.0 standard drinks       /62 (Site: Right Upper Arm, Position: Sitting, Cuff Size: Medium Adult)   Pulse 69   Temp 96.8 °F (36 °C) (Infrared)   Wt 130 lb (59 kg)   SpO2 98%   BMI 23.03 kg/m²       Objective:   Physical Exam  Constitutional:       General: She is not in acute distress. Appearance: She is well-developed. Neck:      Vascular: No carotid bruit. Cardiovascular:      Rate and Rhythm: Normal rate. Rhythm irregularly irregular. Pulses:           Dorsalis pedis pulses are 2+ on the right side and 2+ on the left side. Posterior tibial pulses are 2+ on the right side and 2+ on the left side. Heart sounds: Murmur heard. Systolic (Right sternal border) murmur is present with a grade of 2/6. No friction rub. No gallop. Pulmonary:      Effort: Pulmonary effort is normal.      Breath sounds: Normal breath sounds. Musculoskeletal:      Right hand: No tenderness. Normal range of motion. Left hand: No tenderness. Normal range of motion. Right lower leg: No deformity or tenderness. No edema.       Left lower leg: No tenderness. No edema. Skin:     Findings: Ecchymosis present. Comments: Multiple ecchymosis areas noted over both hands dorsally and lower forearms   Neurological:      Mental Status: She is alert and oriented to person, place, and time. Cranial Nerves: Cranial nerves are intact. Sensory: Sensation is intact. Motor: Tremor present. No weakness or atrophy. Gait: Gait is intact. Psychiatric:         Behavior: Behavior is cooperative. Assessment:      Diagnoses and all orders for this visit:    Acute on chronic systolic (congestive) heart failure (HCC)    Need for influenza vaccination  -     INFLUENZA, QUADV, ADJUVANTED, 65 YRS =, IM, PF, PREFILL SYR, 0.5ML (FLUAD)    Stage 3 chronic kidney disease, unspecified whether stage 3a or 3b CKD (HCC)    Recurrent falls while walking    PAD (peripheral artery disease) (HCC)    Chronic gouty arthropathy            Plan:      Reviewed labs and test results with patient. Clinically she appears much better than she has the last several visits with increasing energy and no difficulty ambulating. Recommend continue current medications. In regards to gouty arthritis overall sounds like she is controlled we will not make any adjustments in medications. Encourage patient to use cane for balance and grocery cart when she is in a store    Continue with nephrology and cardiology care    Patient received counseling on the following healthy behaviors: nutrition and exercise     Patient given educational materials     Health maintenance updated    Discussed use, benefit, and side effects of prescribed medications. Barriers to medication compliance addressed. All patient questions answered. Pt voiced understanding. Patient needs RTC in 4 months. Medical decision making of moderate complexity. Please note that this chart was generated using Dragon dictation software.  Although every effort was made to ensure the accuracy

## 2021-11-02 NOTE — PROGRESS NOTES
Immunization(s) given during visit:     Immunizations Administered     Name Date Dose Route    Influenza, Quadv, adjuvanted, 65 yrs +, IM, PF (Fluad) 11/2/2021 0.5 mL Intramuscular    Site: Deltoid- Left    Lot: [id number]    NDC: 21648-721-37           Patient instructed to remain in clinic for 20 minutes after injection and was advised to report any adverse reaction to me immediately.

## 2021-11-03 PROBLEM — I50.9 HEART FAILURE (HCC): Status: RESOLVED | Noted: 2020-03-25 | Resolved: 2021-11-03

## 2021-11-06 NOTE — PROGRESS NOTES
[facility]   Electrophysiology  Martha Yañez, APRN-CNP  Attending EP: Dr. Dagoberto Astorga    Date: 12/6/2021  I had the privilege of visiting Dallin Dumont in the office. Chief Complaint:   Chief Complaint   Patient presents with    6 Month Follow-Up    Atrial Fibrillation     History of Present Illness: History obtained from patient and medical record. Dallin Dumont is 80 y.o. female with a past medical history of CAD s/p CABG, PAD, HTN, HLD, CKD, CHF, atrial fibrillation, SSS s/p PPM (11/13), and TIA. S/p Watchman (5/17/21)    -Interval history: Today, Dallin Dumont is being seen for routine follow up. She is in a wheel chair for the visit. Her daughter is present. She denies any significant cardiac complaints. Her device shows normal function. She has brief episodes of PAT on her device that are symptomatic. We discussed increasing her BB to help, however, it was recently reduced due to her blood pressure/dizziness issues. She remains on ASA/Plavix s/p Watchman. We reviewed her prior DONA and need to ensure feliciano-device leak prior to stopping her plavix. She is due to have a CT of her hip soon due to on going, severe hip pain. Denies having chest pain, palpitations, shortness of breath, orthopnea/PND, cough, or dizziness at the time of this visit. With regard to medication therapy the patient has been compliant with prescribed regimen. They have tolerated therapy to date. Allergies:   Allergies   Allergen Reactions    Aspirin Nausea Only    Diltiazem Anaphylaxis    Diltiazem Hcl Anaphylaxis    Lorazepam Other (See Comments)     hallucinations  hallucinations  hallucinations    Sulfa Antibiotics Rash and Hives    Atorvastatin Other (See Comments)     Muscle pains  Muscle pains  Muscle pains    Dabigatran Nausea Only     And indigestion  And indigestion    Aka Pradaxa  And indigestion  And indigestion  And indigestion    Aka Pradaxa  And indigestion  And indigestion  And indigestion    Aka Pradaxa    Mysoline [Primidone]     Nsaids      Other reaction(s): Unknown    Other Other (See Comments)     Nitroglycerin patches causes severe headaches    Primidone      Other reaction(s): Unknown     Home Medications:  Prior to Visit Medications    Medication Sig Taking? Authorizing Provider   metoprolol succinate (TOPROL XL) 25 MG extended release tablet TAKE 1 TABLET TWICE DAILY  Patient taking differently: 25 mg .5 tablet daily Yes Manford Denver, MD   topiramate (TOPAMAX) 50 MG tablet TAKE 2 TABLETS TWICE DAILY Yes Manford Denver, MD   HYDROcodone-acetaminophen (NORCO) 5-325 MG per tablet Take 1 tablet by mouth 4 times daily as needed for Pain for up to 30 days. Yes Manford Denver, MD   vitamin D (ERGOCALCIFEROL) 1.25 MG (41286 UT) CAPS capsule Take 1 capsule by mouth once a week Yes Manford Denver, MD   levothyroxine (SYNTHROID) 112 MCG tablet TAKE 1 TABLET EVERY DAY Yes Manford Denver, MD   acetaminophen (TYLENOL) 325 MG tablet Take 1,000 mg by mouth daily  Yes Historical Provider, MD   clopidogrel (PLAVIX) 75 MG tablet Take 1 tablet by mouth daily Yes Lesia Bright MD   betamethasone valerate (VALISONE) 0.1 % cream Apply topically 2 times daily.  Yes Manford Denver, MD   allopurinol (ZYLOPRIM) 100 MG tablet TAKE 1 TABLET EVERY DAY (ALONG WITH 300MG TABLET) Yes Manford Denver, MD   torsemide (DEMADEX) 10 MG tablet TAKE 1 TABLET EVERY OTHER DAY ALTERNATING WITH  2  TABLETS EVERY OTHER DAY  Patient taking differently: Take 20 mg by mouth daily TAKE 1 TABLET EVERY OTHER DAY ALTERNATING WITH  2  TABLETS EVERY OTHER DAY    PATIENT TAKING 20 MG DAILY FOR 5 DAYS THEN RETURN TO ALTERNATE Yes Manford Denver, MD   aspirin EC 81 MG EC tablet Take 1 tablet by mouth daily Yes Laura Canseco, APRN - CNP   nitroGLYCERIN (NITROLINGUAL) 0.4 MG/SPRAY 0.4 mg spray Place 1 spray under the tongue every 5 minutes as needed for Chest pain Yes Ming Nunez MD      Past Medical History:  Past Medical History:   Diagnosis Date    Allergic rhinitis, cause unspecified     Arthritis     Atrial fibrillation ([address]     Bronchopneumonia     CAD (coronary artery disease)     stent:  post cataract surgery (CABG)    Cerebral artery occlusion with cerebral infarction (HCC)     TIA    Chronic gouty arthropathy     Chronic kidney disease     Congestive heart failure, unspecified     Degeneration of cervical intervertebral disc     Essential and other specified forms of tremor     Gout     HIGH CHOLESTEROL     History of CVA (cerebrovascular accident)     Hx of blood clots     Hypertension     Influenza 12/23/2017    Mitral valve stenosis and aortic valve insufficiency     Movement disorder     back problems    Pacemaker     Peptic ulcer, unspecified site, unspecified as acute or chronic, without mention of hemorrhage, perforation, or obstruction     Thyroid disease     Unspecified disorder of kidney and ureter     Unspecified hypothyroidism      Past Surgical History:    has a past surgical history that includes back surgery; Cholecystectomy; Cardiac surgery; Coronary artery bypass graft (1987); shoulder surgery; Cardiac catheterization (7/2012); hip surgery (Left, 03/16/2017); joint replacement; Cardiac catheterization (08/07/2018); Percutaneous Transluminal Coronary Angio (11/04/2014); pr njx aa&/strd tfrml epi lumbar/sacral 1 level (Right, 12/3/2018); Femoral-femoral Bypass Graft (N/A, 8/22/2019); femoral bypass (Left, 8/22/2019); and IR KYPHOPLASTY LUMBAR 1 VERTEBRAL BODY (5/14/2021). Social History:  Reviewed. reports that she quit smoking about 34 years ago. Her smoking use included cigarettes. She has a 28.00 pack-year smoking history. She has never used smokeless tobacco. She reports previous alcohol use. She reports that she does not use drugs. Family History:  Reviewed.  family history includes Cancer in her maternal grandmother, paternal grandmother, and sister; Diabetes in her brother and maternal uncle; Heart Disease in her brother, maternal aunt, and sister; Hypertension in her brother and paternal aunt; Stroke in her maternal aunt. Review of System:  · Constitutional: Negative for fever, night sweats, chills, weight changes, or weakness  · Skin: Positive for bruising. Negative for rash, dry skin, pruritus, bleeding, blood clots, or changes in skin pigment  · HEENT: Negative for vision changes, ringing in the ears, sore throat, dysphagia, or swollen lymph nodes  · Respiratory: Reviewed in HPI  · Cardiovascular: Reviewed in HPI  · Gastrointestinal: Negative for abdominal pain, N/V/D, constipation, or black/tarry stools  · Genito-Urinary: Negative for dysuria, incontinence, urgency, or hematuria  · Musculoskeletal: Negative for joint swelling, muscle pain, or injuries  · Neurological/Psych: Negative for confusion, seizures, dizziness, headaches, balance issues or TIA-like symptoms. No anxiety, depression, or insomnia    Physical Examination:  Vitals:    12/06/21 1108   BP: 132/60   Pulse: 70   SpO2: 99%      Wt Readings from Last 3 Encounters:   12/06/21 133 lb 9.6 oz (60.6 kg)   11/02/21 130 lb (59 kg)   10/26/21 131 lb (59.4 kg)     Constitutional: Cooperative and in no apparent distress, and appears stated age  Skin: Warm and pink; no pallor, cyanosis, or clubbing. Scattered bruising on extremities. HEENT: Symmetric and normocephalic. PERRL, EOM intact. Conjunctiva pink with clear sclera. Mucus membranes pink and moist. Teeth intact. Thyroid smooth without nodules or goiter  Respiratory: Respirations symmetric and unlabored. Lungs clear to auscultation bilaterally, no wheezing, rhonchi, or crackles  Cardiovascular:  Regular rate and rhythm. S1/S2 present without murmurs, rubs, or gallops. Peripheral pulses 2+, capillary refill < 3 seconds. No elevation of JVP. No peripheral edema  Gastrointestinal: Abdomen soft and round.  Bowel sounds normoactive in all quadrants without tenderness or masses. Musculoskeletal: Bilateral upper and lower extremity strength 5/5 with full ROM. Neurological/Psych: Awake and orientated to person, place and time. Calm affect, appropriate mood. Pertinent labs, diagnostic, device, and imaging results reviewed as a part of this visit    LABS    CBC:   Lab Results   Component Value Date    WBC 11.9 (H) 2021    HGB 14.1 2021    HCT 45.4 2021    MCV 81.2 (A) 10/20/2021     (H) 2021     BMP:   Lab Results   Component Value Date    CREATININE 1.9 (H) 2021    BUN 41 (A) 10/20/2021     10/20/2021    K 4.1 10/20/2021     (A) 10/20/2021    CO2 23 10/20/2021     Estimated Creatinine Clearance: 19 mL/min (A) (based on SCr of 1.9 mg/dL (H)). Thyroid:   Lab Results   Component Value Date    TSH 0.90 2021    I5OFUUX 7.3 10/03/2018     Lipid Panel:   Lab Results   Component Value Date    CHOL 120 2020    HDL 29 2020    HDL 31 2012    TRIG 97 2020     LFTs:  Lab Results   Component Value Date    ALT 11 2020    AST 18 2020    ALKPHOS 80 2020    BILITOT 0.5 2020     Coags:   Lab Results   Component Value Date    PROTIME 11.7 2021    INR 1.01 2021    APTT 30.8 2021       EC2021  - A-paced, rate 71, , QTc 429    Echo:   Left ventricle - normal size, thickness, reduced function with EF of 45%  LV wall motion - diffuse hypokinesis. Mitral valve - thickened, annular calcification, moderate regurgitation  Aortic valve - thickened, calcified, mild regurgitation  Tricuspid valve - mild regurgitation with PASP of 25mmHg  Pulmonic valve - trivial regurgitation  Biatrial enlargement  Pacemaker / ICD lead is visualized in the right heart. DONA:   Ejection fraction is visually estimated to be 45-50 %. Mild thickening of anterior, posterior leaflet of mitral valve.    There is decreased leaflet motion and \"hockey compensated   ~ EF 45% per echo/DONA (7/21)  - Continue with Toprol XL 25 mg BID, torsemide 10 mg/20 mg daily alternating  ~ ACE-I/ARB contraindicated due to renal insufficiency and risk of hyperkalemia  - Aggressive medical therapy with risk factor modification  - Discussed with patient importance of monitoring weight, low sodium diet and fluid restriction  - Followed by CHF team    5. CAD  - Hx of CABG  - Stable  - No complaints of angina  - Continue ASA, BB, and statin   ~ No ACE/ARB due to CKD    6. HTN  - Controlled: Goal <140/80  - Continue current medications  - Encouraged to monitor and log BP readings at home, then bring log to next visit  - Discussed importance of low sodium diet, weight control and exercise    Plan:  1. Will schedule for DONA. Our  will be contacting you from [phone] to schedule your procedure  2. You may take an extra 12.5 mg of metoprolol if the evening if your palpitations worsen    F/U: Follow-up with CHF team as scheduled and EP in 4 months  -Follow up with device clinic as scheduled  -Call Nicolas Richter at [phone] with any questions    Diet & Exercise:   The patient is counseled to follow a low salt diet to assure blood pressure remains controlled for cardiovascular risk factor modification   The patient is counseled to avoid excess caffeine, and energy drinks as this may exacerbated ectopy and arrhythmia   The patient is counseled to lose weight to control cardiovascular risk factors   Exercise program discussed: To improve overall cardiovascular health, the patient is instructed to increase cardiovascular related activities with a goal of 150 min/week of moderate level activity or 10,000 steps per day. Encouraged to perform as much activity as tolerated    I have addressed the patient's cardiac risk factors and adjusted pharmacologic treatment as needed. In addition, I have reinforced the need for patient directed risk factor modification.     I independently reviewed the device check interrogation and ECG    All questions and concerns were addressed with the patient. Alternatives to treatment were discussed. Thank you for allowing to us to participate in the care of Joshua Swanson. Time  34 minutes spent preparing to see patient including reviewing patient history/prior tests/prior consults, performing a medical exam, counseling and educating patient/family/caregiver, ordering medications/tests/procedures, referring and communicating with PCPs and other pertinent consultants, documenting information in the EMR, independently interpreting results and communicating to family and coordination of patient care.     TIFFANIE Mendiola-CNP  [address]   Office: [phone]

## 2021-11-16 ENCOUNTER — TELEPHONE (OUTPATIENT)
Dept: CARDIOLOGY CLINIC | Age: 81
End: 2021-11-16

## 2021-11-16 ENCOUNTER — NURSE ONLY (OUTPATIENT)
Dept: CARDIOLOGY CLINIC | Age: 81
End: 2021-11-16
Payer: MEDICARE

## 2021-11-16 DIAGNOSIS — R00.1 SYMPTOMATIC BRADYCARDIA: ICD-10-CM

## 2021-11-16 DIAGNOSIS — Z95.0 PACEMAKER: ICD-10-CM

## 2021-11-16 DIAGNOSIS — I50.22 CHRONIC SYSTOLIC HEART FAILURE (HCC): ICD-10-CM

## 2021-11-16 DIAGNOSIS — I48.0 PAF (PAROXYSMAL ATRIAL FIBRILLATION) (HCC): ICD-10-CM

## 2021-11-16 DIAGNOSIS — I25.5 ISCHEMIC CARDIOMYOPATHY: ICD-10-CM

## 2021-11-16 DIAGNOSIS — I49.5 SICK SINUS SYNDROME (HCC): ICD-10-CM

## 2021-11-16 PROCEDURE — 93280 PM DEVICE PROGR EVAL DUAL: CPT | Performed by: INTERNAL MEDICINE

## 2021-11-16 NOTE — TELEPHONE ENCOUNTER
Spoke with Patient. Her Home Monitor is not working. Gave her the number to [facility] to call and she will come in this afternoon for a device check.

## 2021-11-16 NOTE — TELEPHONE ENCOUNTER
Since yesterday she has been having skipped beats . She thinks her HR is in the 70's. She doesn't want to go to the ER but wants to speak to NPSR to find out what to do . Patient does not know how to send a phone transmission.  Please call to advise

## 2021-11-16 NOTE — PROGRESS NOTES
Increase metoprolol to 12.5 mg bid and see if she tolerates with better rate control    S/p Watchman.      Clyde Dunbar, APRN-CNP

## 2021-11-17 NOTE — PROGRESS NOTES
Patient comes in for programming evaluation for her pacemaker. All sensing and pacing parameters are within normal range. Patient comes into the office today for a device check. Patient complains of having palpitations. Home Monitor not working. PVCs, PACs and recent short Atrial runs noted on her device. Due to dizziness her kidney doctor recently reduced her metoprolol dose. Battery life 14.5 years  AP 78%.  7%. Patient remains on metoprolol. Patient has Watchman and is off AC medications. No changes made on device this session. Please see interrogation for more detail. Patient will increase her metoprolol to 12.5 mg bid and monitor her symptoms. She follows up in NPSR on 12/6/2021/ Patient is to call the office if her symptoms worsen or she starts to feel dizzy and or if her Blood pressure drops.

## 2021-11-19 ENCOUNTER — NURSE TRIAGE (OUTPATIENT)
Dept: OTHER | Facility: CLINIC | Age: 81
End: 2021-11-19

## 2021-11-19 NOTE — TELEPHONE ENCOUNTER
Received call from Joao Tejada at [facility] with [employer] Complaint. Brief description of triage: c/o diarrhea for  Last 3-4 days leaks stools does not even know leaking stool,and stools are loose states has taken antidiarrhea meds taking liquids well no fevers, no pain noted some blood on pad she is wearing not all the time and very little may be from being sore    Triage indicates for patient to be seen in 3 days or if unable to go to [facility]    Care advice provided, patient verbalizes understanding; denies any other questions or concerns; instructed to call back for any new or worsening symptoms. Writer provided warm transfer to Anna Barrow at [facility] for appointment scheduling. Attention Provider: Thank you for allowing me to participate in the care of your patient. The patient was connected to triage in response to information provided to the ECC/PSC. Please do not respond through this encounter as the response is not directed to a shared pool. Reason for Disposition   MILD diarrhea (e.g., 1-3 or more stools than normal in past 24 hours) diarrhea without known cause and present > 7 days    Answer Assessment - Initial Assessment Questions  1. DIARRHEA SEVERITY: \"How bad is the diarrhea? \" \"How many extra stools have you had in the past 24 hours than normal?\"     - NO DIARRHEA (SCALE 0)    - MILD (SCALE 1-3): Few loose or mushy BMs; increase of 1-3 stools over normal daily number of stools; mild increase in ostomy output. -  MODERATE (SCALE 4-7): Increase of 4-6 stools daily over normal; moderate increase in ostomy output. * SEVERE (SCALE 8-10; OR 'WORST POSSIBLE'): Increase of 7 or more stools daily over normal; moderate increase in ostomy output; incontinence. Mild to mod    2. ONSET: \"When did the diarrhea begin? \"       3-4 days    3. BM CONSISTENCY: \"How loose or watery is the diarrhea? \"       Loose    4. VOMITING: \"Are you also vomiting? \" If so, ask: \"How many times in the past 24 hours? \"      None     5. ABDOMINAL PAIN: Connor Antis you having any abdominal pain? \" If yes: \"What does it feel like? \" (e.g., crampy, dull, intermittent, constant)       None    6. ABDOMINAL PAIN SEVERITY: If present, ask: \"How bad is the pain? \"  (e.g., Scale 1-10; mild, moderate, or severe)    - MILD (1-3): doesn't interfere with normal activities, abdomen soft and not tender to touch     - MODERATE (4-7): interferes with normal activities or awakens from sleep, tender to touch     - SEVERE (8-10): excruciating pain, doubled over, unable to do any normal activities        None    7. ORAL INTAKE: If vomiting, \"Have you been able to drink liquids? \" \"How much fluids have you had in the past 24 hours? \"      Drinking fine    8. HYDRATION: \"Any signs of dehydration? \" (e.g., dry mouth [not just dry lips], too weak to stand, dizziness, new weight loss) \"When did you last urinate? \"      No dehydration    9. EXPOSURE: \"Have you traveled to a foreign country recently? \" \"Have you been exposed to anyone with diarrhea? \" \"Could you have eaten any food that was spoiled? \"      No    10. ANTIBIOTIC USE: \"Are you taking antibiotics now or have you taken antibiotics in the past 2 months? \"       Yes in the last couple weeks      11. OTHER SYMPTOMS: \"Do you have any other symptoms? \" (e.g., fever, blood in stool)       Noted blood on pad she is wearing    12. PREGNANCY: \"Is there any chance you are pregnant? \" \"When was your last menstrual period? \"        n/a    Protocols used: [id number]

## 2021-11-22 ENCOUNTER — TELEPHONE (OUTPATIENT)
Dept: FAMILY MEDICINE CLINIC | Age: 81
End: 2021-11-22

## 2021-11-22 NOTE — TELEPHONE ENCOUNTER
----- Message from Jenni Winn sent at 11/19/2021  5:24 PM EST -----  Subject: Appointment Request    Reason for Call: Semi-Urgent Return from RN Triage    QUESTIONS  Type of Appointment? Established Patient  Reason for appointment request? No appointments available during search  Additional Information for Provider? nurse request that she be seen. please call patient  ---------------------------------------------------------------------------  --------------  CALL BACK INFO  What is the best way for the office to contact you? Do not leave any   message, patient will call back for answer  Preferred Call Back Phone Number?  [phone]  ---------------------------------------------------------------------------  --------------  SCRIPT ANSWERS

## 2021-11-23 DIAGNOSIS — M15.9 PRIMARY OSTEOARTHRITIS INVOLVING MULTIPLE JOINTS: ICD-10-CM

## 2021-11-23 RX ORDER — HYDROCODONE BITARTRATE AND ACETAMINOPHEN 5; 325 MG/1; MG/1
1 TABLET ORAL 4 TIMES DAILY PRN
Qty: 120 TABLET | Refills: 0 | Status: SHIPPED | OUTPATIENT
Start: 2021-11-23 | End: 2022-01-26 | Stop reason: SDUPTHER

## 2021-11-23 NOTE — TELEPHONE ENCOUNTER
Medication:   Requested Prescriptions     Pending Prescriptions Disp Refills    HYDROcodone-acetaminophen (NORCO) 5-325 MG per tablet 120 tablet 0     Sig: Take 1 tablet by mouth 4 times daily as needed for Pain for up to 30 days. Last Filled:  9/3/2021    Patient Phone Number: [phone] (home) [phone] (work)    Last appt: 11/2/2021   Next appt: 3/4/2022    Last OARRS:   RX Monitoring 6/1/2021   Attestation -   Periodic Controlled Substance Monitoring Possible medication side effects, risk of tolerance/dependence & alternative treatments discussed.      PDMP Monitoring:    Last PDMP Christa  as Reviewed [facility]):  Review User Review Instant Review Result   Venkatesh Eric 6/1/2021  4:31 PM Reviewed PDMP [1]     Preferred Pharmacy:   [facility] [place] [address], [address] [phone] [facility] [phone]  [facility] [address] [phone]  Phone: [phone] Fax: [phone]    [address] [facility] [phone] - F [phone]  [address] [address]  Phone: [phone] Fax: [phone]

## 2021-11-23 NOTE — TELEPHONE ENCOUNTER
HYDROcodone-acetaminophen (NORCO) 5-325 MG per tablet 120 tablet 0 9/3/2021 10/3/2021    Sig - Route:  Take 1 tablet by mouth 4 times daily as needed for Pain for up to 30 days. - Oral        [facility] in chart

## 2021-11-29 ENCOUNTER — TELEPHONE (OUTPATIENT)
Dept: FAMILY MEDICINE CLINIC | Age: 81
End: 2021-11-29

## 2021-11-29 RX ORDER — METOPROLOL SUCCINATE 25 MG/1
TABLET, EXTENDED RELEASE ORAL
Qty: 180 TABLET | Refills: 1 | Status: SHIPPED | OUTPATIENT
Start: 2021-11-29 | End: 2021-12-06

## 2021-11-29 RX ORDER — TOPIRAMATE 50 MG/1
TABLET, FILM COATED ORAL
Qty: 360 TABLET | Refills: 1 | Status: SHIPPED | OUTPATIENT
Start: 2021-11-29 | End: 2022-06-16

## 2021-11-29 NOTE — TELEPHONE ENCOUNTER
Medication:   Requested Prescriptions     Pending Prescriptions Disp Refills    metoprolol succinate (TOPROL XL) 25 MG extended release tablet [Pharmacy Med Name: METOPROLOL SUCCINATE ER 25 MG Tablet Extended Release 24 Hour] 180 tablet      Sig: TAKE 1 TABLET TWICE DAILY    topiramate (TOPAMAX) 50 MG tablet [Pharmacy Med Name: TOPIRAMATE 50 MG Tablet] 360 tablet 0     Sig: TAKE 2 TABLETS TWICE DAILY      Last Filled:      Patient Phone Number: [phone] (home) [phone] (work)    Last appt: 11/2/2021   Next appt: 3/4/2022    Last OARRS:   RX Monitoring 6/1/2021   Attestation -   Periodic Controlled Substance Monitoring Possible medication side effects, risk of tolerance/dependence & alternative treatments discussed.      PDMP Monitoring:    Last PDMP Kemi Soares as Reviewed [facility]):  Review User Review Instant Review Result   Anna Dominguez 6/1/2021  4:31 PM Reviewed PDMP [1]     Preferred Pharmacy:   [facility] [address], [address] [facility] [phone] [place] [phone]  [employer] [phone] [phone]  Phone: [phone] Fax: [phone]    [address] Mail Delivery - Lina Larsen [phone] - F [phone]  [address] [place] [address]  Phone: [phone] Fax: [phone]

## 2021-11-29 NOTE — TELEPHONE ENCOUNTER
----- Message from Florinaabdi Rothman sent at 11/27/2021 12:10 PM EST -----  Subject: Message to Provider    QUESTIONS  Information for Provider? pt received a letter regarding practice being   moved. she did not see Dr. Neel García name listed so pt is wondering will pcp   be moving as well and should she be concerned? please call pt to discuss   further. ---------------------------------------------------------------------------  --------------  Annette MIRAMONTES  What is the best way for the office to contact you? OK to leave message on   voicemail  Preferred Call Back Phone Number? [phone]  ---------------------------------------------------------------------------  --------------  SCRIPT ANSWERS  Relationship to Patient?  Self

## 2021-12-01 ENCOUNTER — TELEPHONE (OUTPATIENT)
Dept: FAMILY MEDICINE CLINIC | Age: 81
End: 2021-12-01

## 2021-12-01 DIAGNOSIS — M25.551 RIGHT HIP PAIN: Primary | ICD-10-CM

## 2021-12-01 NOTE — TELEPHONE ENCOUNTER
Patient called with complaints about her right hip pain that has gotten worst since she saw you, and would like to know if you could place an order for a cat scan    She states that it is very painful and hard for her to get in and out of a car    Please advise and give her a callback

## 2021-12-06 ENCOUNTER — TELEPHONE (OUTPATIENT)
Dept: FAMILY MEDICINE CLINIC | Age: 81
End: 2021-12-06

## 2021-12-06 ENCOUNTER — OFFICE VISIT (OUTPATIENT)
Dept: CARDIOLOGY CLINIC | Age: 81
End: 2021-12-06
Payer: MEDICARE

## 2021-12-06 ENCOUNTER — NURSE ONLY (OUTPATIENT)
Dept: CARDIOLOGY CLINIC | Age: 81
End: 2021-12-06
Payer: MEDICARE

## 2021-12-06 VITALS
OXYGEN SATURATION: 99 % | WEIGHT: 133.6 LBS | HEART RATE: 70 BPM | HEIGHT: 63 IN | BODY MASS INDEX: 23.67 KG/M2 | DIASTOLIC BLOOD PRESSURE: 60 MMHG | SYSTOLIC BLOOD PRESSURE: 132 MMHG

## 2021-12-06 DIAGNOSIS — I10 HTN (HYPERTENSION), BENIGN: ICD-10-CM

## 2021-12-06 DIAGNOSIS — I25.5 ISCHEMIC CARDIOMYOPATHY: ICD-10-CM

## 2021-12-06 DIAGNOSIS — I48.0 PAF (PAROXYSMAL ATRIAL FIBRILLATION) (HCC): ICD-10-CM

## 2021-12-06 DIAGNOSIS — R00.1 SYMPTOMATIC BRADYCARDIA: ICD-10-CM

## 2021-12-06 DIAGNOSIS — I48.0 PAF (PAROXYSMAL ATRIAL FIBRILLATION) (HCC): Primary | ICD-10-CM

## 2021-12-06 DIAGNOSIS — I47.1 PAT (PAROXYSMAL ATRIAL TACHYCARDIA) (HCC): ICD-10-CM

## 2021-12-06 DIAGNOSIS — Z95.0 PACEMAKER: ICD-10-CM

## 2021-12-06 DIAGNOSIS — Z95.818 PRESENCE OF WATCHMAN LEFT ATRIAL APPENDAGE CLOSURE DEVICE: ICD-10-CM

## 2021-12-06 DIAGNOSIS — I49.5 SICK SINUS SYNDROME (HCC): ICD-10-CM

## 2021-12-06 DIAGNOSIS — I50.22 CHRONIC SYSTOLIC HEART FAILURE (HCC): ICD-10-CM

## 2021-12-06 PROCEDURE — G8399 PT W/DXA RESULTS DOCUMENT: HCPCS | Performed by: NURSE PRACTITIONER

## 2021-12-06 PROCEDURE — 4040F PNEUMOC VAC/ADMIN/RCVD: CPT | Performed by: NURSE PRACTITIONER

## 2021-12-06 PROCEDURE — 1090F PRES/ABSN URINE INCON ASSESS: CPT | Performed by: NURSE PRACTITIONER

## 2021-12-06 PROCEDURE — 99214 OFFICE O/P EST MOD 30 MIN: CPT | Performed by: NURSE PRACTITIONER

## 2021-12-06 PROCEDURE — 1123F ACP DISCUSS/DSCN MKR DOCD: CPT | Performed by: NURSE PRACTITIONER

## 2021-12-06 PROCEDURE — G8484 FLU IMMUNIZE NO ADMIN: HCPCS | Performed by: NURSE PRACTITIONER

## 2021-12-06 PROCEDURE — G8420 CALC BMI NORM PARAMETERS: HCPCS | Performed by: NURSE PRACTITIONER

## 2021-12-06 PROCEDURE — 1036F TOBACCO NON-USER: CPT | Performed by: NURSE PRACTITIONER

## 2021-12-06 PROCEDURE — G8427 DOCREV CUR MEDS BY ELIG CLIN: HCPCS | Performed by: NURSE PRACTITIONER

## 2021-12-06 RX ORDER — METOPROLOL SUCCINATE 25 MG/1
12.5 TABLET, EXTENDED RELEASE ORAL DAILY
Qty: 45 TABLET | Refills: 0 | Status: ON HOLD
Start: 2021-12-06 | End: 2022-10-21 | Stop reason: HOSPADM

## 2021-12-06 NOTE — PROGRESS NOTES
Patient comes in for programming evaluation for her pacemaker. All sensing and pacing parameters are within normal range. Battery life 14.5 years  AP 93%.  4%. Short AT/AF episodes noted. Last on 11/30/2021, longest 38 seconds. Patient remains on metoprolol. Patient has Watchman and is off AC medications. No changes made on device this session. Please see interrogation for more detail. Patient will see NPSR today and follow up in 3 months office or remotely.

## 2021-12-06 NOTE — PATIENT INSTRUCTIONS
1. Will schedule for DONA. Our  will be contacting you from [phone] to schedule your procedure.

## 2021-12-06 NOTE — LETTER
[facility]    12/6/21  Sekou Johnson  1940  EP Procedures     Pacemaker implant (single/dual)  EP Study    ICD implant (single/dual)  Atrial flutter ablation (DONA Y/N)    Biv implant ICD  Tilt Table    Biv implant PPM  Atrial fibrillation ablation (DONA Yes)    Generator Change (PPM/ICD/BiV)  SVT ablation    Lead revision (RV/LA/RA) (<1 month)  VT ablation      Lead extraction +/- upgrade (BiV/PPM/ICD)  VT Ischemic/ non-ischemic    Loop implant/ removal  VT RVOT    Cardioversion  VT Left sided   XXX DONA (assess for feliciano-device leak s/p watchman)  AVN ablation     Equipment     Medtronic   REBECA Mapping System    [employer]  Carto Mapping System    [place] [employer]  CryoAblation    Biotronik  Laser Lead Extraction     EP Procedures Scheduling Request    # hours Requested   Scheduled  Date:   Specific Day  Completed    Anesthesia  F/u Date:   CT surgery backup  COVID     Overnight stay      Location [facility]       Pre-Procedure Labs / Imaging     PT/INR  Type & cross    CBC  Units PRBC    BMP/Mg  Units FFP    Venogram  Cardiac CTA for Pulmonary vein mapping     RN INITIALS:     Patient Instructions  Do not eat or drink after midnight the night prior to procedure  Dx: S/p watchman, feliciano-device leak

## 2021-12-06 NOTE — TELEPHONE ENCOUNTER
Daughter called in would like [facility] the [facility] CT [facility]     Please advise

## 2021-12-07 PROCEDURE — 93280 PM DEVICE PROGR EVAL DUAL: CPT | Performed by: INTERNAL MEDICINE

## 2021-12-14 ENCOUNTER — OFFICE VISIT (OUTPATIENT)
Dept: FAMILY MEDICINE CLINIC | Age: 81
End: 2021-12-14
Payer: MEDICARE

## 2021-12-14 VITALS
BODY MASS INDEX: 22.92 KG/M2 | DIASTOLIC BLOOD PRESSURE: 70 MMHG | SYSTOLIC BLOOD PRESSURE: 122 MMHG | WEIGHT: 129.38 LBS | RESPIRATION RATE: 16 BRPM | HEART RATE: 62 BPM | TEMPERATURE: 97.2 F | OXYGEN SATURATION: 95 %

## 2021-12-14 DIAGNOSIS — M54.16 ACUTE LUMBAR RADICULOPATHY: ICD-10-CM

## 2021-12-14 DIAGNOSIS — S61.222A LACERATION OF RIGHT MIDDLE FINGER WITH FOREIGN BODY WITHOUT DAMAGE TO NAIL, INITIAL ENCOUNTER: Primary | ICD-10-CM

## 2021-12-14 PROCEDURE — 1036F TOBACCO NON-USER: CPT | Performed by: FAMILY MEDICINE

## 2021-12-14 PROCEDURE — G8484 FLU IMMUNIZE NO ADMIN: HCPCS | Performed by: FAMILY MEDICINE

## 2021-12-14 PROCEDURE — 1090F PRES/ABSN URINE INCON ASSESS: CPT | Performed by: FAMILY MEDICINE

## 2021-12-14 PROCEDURE — 4040F PNEUMOC VAC/ADMIN/RCVD: CPT | Performed by: FAMILY MEDICINE

## 2021-12-14 PROCEDURE — 99213 OFFICE O/P EST LOW 20 MIN: CPT | Performed by: FAMILY MEDICINE

## 2021-12-14 PROCEDURE — 1123F ACP DISCUSS/DSCN MKR DOCD: CPT | Performed by: FAMILY MEDICINE

## 2021-12-14 PROCEDURE — G8427 DOCREV CUR MEDS BY ELIG CLIN: HCPCS | Performed by: FAMILY MEDICINE

## 2021-12-14 PROCEDURE — G8420 CALC BMI NORM PARAMETERS: HCPCS | Performed by: FAMILY MEDICINE

## 2021-12-14 PROCEDURE — G8399 PT W/DXA RESULTS DOCUMENT: HCPCS | Performed by: FAMILY MEDICINE

## 2021-12-14 NOTE — PROGRESS NOTES
Subjective:      Patient ID: Lolis Parker is a 80 y.o. female. CC: Patient presents for ER follow-up. HPI Pt is here for an ER follow up. Pt cut her right hand middle finger. Pt stated that she had stiches put in but it been leaking. Patient was in the emergency room on the eighth with a finger laceration. She also has review her CT scan of right hip. The right hip x-ray did not demonstrate any significant arthritis. Patient has known disc disease and has had prior procedures at the L4-L5 level but her most recent CT scan demonstrated abnormalities at the L3-L4 level. She clearly has sciatica type symptoms into her right foot.     Review of Systems  Patient Active Problem List   Diagnosis    Mixed hyperlipidemia    Chronic gouty arthropathy    Acquired hypothyroidism    Non-rheumatic mitral regurgitation    COPD (chronic obstructive pulmonary disease) ([facility])    Restrictive lung disease    Sick sinus syndrome ([facility])    Allergic rhinitis    Fibrocystic breast    Cerebrovascular accident (CVA) ([address]    HTN (hypertension), benign    Pacemaker    PAT (paroxysmal atrial tachycardia) ([facility])    Primary osteoarthritis involving multiple joints    Vitamin D deficiency    Tremor    Chronic total occlusion of native coronary artery    Age related osteoporosis    Chronic systolic heart failure ([facility])    Stage 3 chronic kidney disease ([address]    PAD (peripheral artery disease) ([address]    Atherosclerosis of native arteries of extremities with intermittent claudication, bilateral legs ([facility])    Idiopathic peripheral neuropathy    Ischemic cardiomyopathy    Dizziness    Peripheral vertigo, bilateral    PAF (paroxysmal atrial fibrillation) ([facility])    Dyslipidemia    Iron deficiency anemia    Anemia    Bilateral sensorineural hearing loss    Memory loss due to medical condition    Symptomatic bradycardia    Pacemaker lead failure    Recurrent falls while walking    Presence of Watchman left atrial appendage closure device       Outpatient Medications Marked as Taking for the 12/14/21 encounter (Office Visit) with Bettyjo Gottron, MD   Medication Sig Dispense Refill    metoprolol succinate (TOPROL XL) 25 MG extended release tablet Take 0.5 tablets by mouth daily . 5 tablet daily 45 tablet 0    topiramate (TOPAMAX) 50 MG tablet TAKE 2 TABLETS TWICE DAILY 360 tablet 1    HYDROcodone-acetaminophen (NORCO) 5-325 MG per tablet Take 1 tablet by mouth 4 times daily as needed for Pain for up to 30 days. 120 tablet 0    vitamin D (ERGOCALCIFEROL) 1.25 MG (07210 UT) CAPS capsule Take 1 capsule by mouth once a week 12 capsule 1    levothyroxine (SYNTHROID) 112 MCG tablet TAKE 1 TABLET EVERY DAY 90 tablet 0    acetaminophen (TYLENOL) 325 MG tablet Take 1,000 mg by mouth daily       clopidogrel (PLAVIX) 75 MG tablet Take 1 tablet by mouth daily 90 tablet 3    betamethasone valerate (VALISONE) 0.1 % cream Apply topically 2 times daily.  30 g 5    allopurinol (ZYLOPRIM) 100 MG tablet TAKE 1 TABLET EVERY DAY (ALONG WITH 300MG TABLET) 90 tablet 1    torsemide (DEMADEX) 10 MG tablet TAKE 1 TABLET EVERY OTHER DAY ALTERNATING WITH  2  TABLETS EVERY OTHER DAY (Patient taking differently: Take 20 mg by mouth daily TAKE 1 TABLET EVERY OTHER DAY ALTERNATING WITH  2  TABLETS EVERY OTHER DAY    PATIENT TAKING 20 MG DAILY FOR 5 DAYS THEN RETURN TO ALTERNATE) 135 tablet 1    aspirin EC 81 MG EC tablet Take 1 tablet by mouth daily 30 tablet 5    nitroGLYCERIN (NITROLINGUAL) 0.4 MG/SPRAY 0.4 mg spray Place 1 spray under the tongue every 5 minutes as needed for Chest pain 1 Bottle 3       Allergies   Allergen Reactions    Aspirin Nausea Only    Diltiazem Anaphylaxis    Diltiazem Hcl Anaphylaxis    Lorazepam Other (See Comments)     hallucinations  hallucinations  hallucinations    Sulfa Antibiotics Rash and Hives    Atorvastatin Other (See Comments)     Muscle pains  Muscle pains  Muscle pains    Dabigatran Nausea Only And indigestion  And indigestion    Aka Pradaxa  And indigestion  And indigestion  And indigestion    Aka Pradaxa  And indigestion  And indigestion  And indigestion    Aka Pradaxa    Mysoline [Primidone]     Nsaids      Other reaction(s): Unknown    Other Other (See Comments)     Nitroglycerin patches causes severe headaches    Primidone      Other reaction(s): Unknown       Social History     Tobacco Use    Smoking status: Former Smoker     Packs/day: 1.00     Years: 28.00     Pack years: 28.00     Types: Cigarettes     Quit date: 1987     Years since quittin.9    Smokeless tobacco: Never Used    Tobacco comment: H.O.smoking at age 15 / smoked up to 1 p.p.d / quit    Substance Use Topics    Alcohol use: Not Currently     Alcohol/week: 0.0 standard drinks       /70 (Site: Right Upper Arm, Position: Sitting, Cuff Size: Medium Adult)   Pulse 62   Temp 97.2 °F (36.2 °C) (Infrared)   Resp 16   Wt 129 lb 6 oz (58.7 kg)   SpO2 95%   BMI 22.92 kg/m²     Objective:   Physical Exam  Vitals and nursing note reviewed. Constitutional:       General: She is not in acute distress. Appearance: She is well-developed. Skin:     General: Skin is warm. Findings: Laceration present. Comments: Dorsal side of the right long finger with a Y-shaped laceration. The Y portion is not healing well and has serous drainage. No signs of infection. 2 sutures were removed from the lower part of the Y. New bandage was applied. Neurological:      Mental Status: She is alert. Psychiatric:         Behavior: Behavior is cooperative.          Assessment:      Kesha Leonard was seen today for follow-up from hospital.    Diagnoses and all orders for this visit:    Laceration of right middle finger with foreign body without damage to nail, initial encounter    Acute lumbar radiculopathy  -     External Referral To Spine Surgery            Plan:      Patient will need to come back early next week for suture removal. In the meantime obviously advised her to use soap and water to clean the area and keep a bandage over it to prevent it from getting pulled. Referral to Dr. Delisa Perez for consultation as she has been evaluated in the past for similar issues. She probably would benefit from an epidural injection. Medical decision making of low complexity    Please note that this chart was generated using Dragon dictation software. Although every effort was made to ensure the accuracy of this automated transcription, some errors in transcription may have occurred.

## 2021-12-17 RX ORDER — LEVOTHYROXINE SODIUM 112 UG/1
TABLET ORAL
Qty: 90 TABLET | Refills: 0 | Status: SHIPPED | OUTPATIENT
Start: 2021-12-17 | End: 2022-01-26

## 2021-12-20 ENCOUNTER — PROCEDURE VISIT (OUTPATIENT)
Dept: FAMILY MEDICINE CLINIC | Age: 81
End: 2021-12-20
Payer: MEDICARE

## 2021-12-20 ENCOUNTER — HOSPITAL ENCOUNTER (OUTPATIENT)
Age: 81
Discharge: HOME | End: 2021-12-20
Payer: MEDICARE

## 2021-12-20 VITALS
TEMPERATURE: 97.2 F | RESPIRATION RATE: 16 BRPM | OXYGEN SATURATION: 93 % | HEART RATE: 71 BPM | DIASTOLIC BLOOD PRESSURE: 68 MMHG | SYSTOLIC BLOOD PRESSURE: 102 MMHG

## 2021-12-20 DIAGNOSIS — R80.1 PERSISTENT PROTEINURIA: ICD-10-CM

## 2021-12-20 DIAGNOSIS — E87.5 HYPERKALEMIA: ICD-10-CM

## 2021-12-20 DIAGNOSIS — S61.212D LACERATION OF RIGHT MIDDLE FINGER WITHOUT FOREIGN BODY WITHOUT DAMAGE TO NAIL, SUBSEQUENT ENCOUNTER: Primary | ICD-10-CM

## 2021-12-20 DIAGNOSIS — I50.22 CHRONIC SYSTOLIC CONGESTIVE HEART FAILURE (HCC): ICD-10-CM

## 2021-12-20 DIAGNOSIS — N18.9 ACUTE KIDNEY INJURY SUPERIMPOSED ON CHRONIC KIDNEY DISEASE (HCC): ICD-10-CM

## 2021-12-20 DIAGNOSIS — N18.32 STAGE 3B CHRONIC KIDNEY DISEASE (HCC): ICD-10-CM

## 2021-12-20 DIAGNOSIS — N17.9 ACUTE KIDNEY INJURY SUPERIMPOSED ON CHRONIC KIDNEY DISEASE (HCC): ICD-10-CM

## 2021-12-20 LAB
ALBUMIN SERPL-MCNC: 4.3 G/DL (ref 3.4–5)
ANION GAP SERPL CALCULATED.3IONS-SCNC: 15 MMOL/L (ref 3–16)
BUN BLDV-MCNC: 44 MG/DL (ref 7–20)
CALCIUM SERPL-MCNC: 9.4 MG/DL (ref 8.3–10.6)
CHLORIDE BLD-SCNC: 109 MMOL/L (ref 99–110)
CO2: 21 MMOL/L (ref 21–32)
CREAT SERPL-MCNC: 1.6 MG/DL (ref 0.6–1.2)
CREATININE URINE: 126.7 MG/DL (ref 28–259)
GFR AFRICAN AMERICAN: 37
GFR NON-AFRICAN AMERICAN: 31
GLUCOSE BLD-MCNC: 94 MG/DL (ref 70–99)
HCT VFR BLD CALC: 47.4 % (ref 36–48)
HEMOGLOBIN: 14.8 G/DL (ref 12–16)
MCH RBC QN AUTO: 26.1 PG (ref 26–34)
MCHC RBC AUTO-ENTMCNC: 31.3 G/DL (ref 31–36)
MCV RBC AUTO: 83.5 FL (ref 80–100)
PARATHYROID HORMONE INTACT: 62.3 PG/ML (ref 14–72)
PDW BLD-RTO: 22.3 % (ref 12.4–15.4)
PHOSPHORUS: 4.1 MG/DL (ref 2.5–4.9)
PLATELET # BLD: 371 K/UL (ref 135–450)
PMV BLD AUTO: 8.7 FL (ref 5–10.5)
POTASSIUM SERPL-SCNC: 4.9 MMOL/L (ref 3.5–5.1)
PROTEIN PROTEIN: 27 MG/DL
RBC # BLD: 5.68 M/UL (ref 4–5.2)
SODIUM BLD-SCNC: 145 MMOL/L (ref 136–145)
VITAMIN D 25-HYDROXY: 48 NG/ML
WBC # BLD: 10.5 K/UL (ref 4–11)

## 2021-12-20 PROCEDURE — G8484 FLU IMMUNIZE NO ADMIN: HCPCS | Performed by: FAMILY MEDICINE

## 2021-12-20 PROCEDURE — 36415 COLL VENOUS BLD VENIPUNCTURE: CPT

## 2021-12-20 PROCEDURE — 1123F ACP DISCUSS/DSCN MKR DOCD: CPT | Performed by: FAMILY MEDICINE

## 2021-12-20 PROCEDURE — G8399 PT W/DXA RESULTS DOCUMENT: HCPCS | Performed by: FAMILY MEDICINE

## 2021-12-20 PROCEDURE — 1090F PRES/ABSN URINE INCON ASSESS: CPT | Performed by: FAMILY MEDICINE

## 2021-12-20 PROCEDURE — 82306 VITAMIN D 25 HYDROXY: CPT

## 2021-12-20 PROCEDURE — 82570 ASSAY OF URINE CREATININE: CPT

## 2021-12-20 PROCEDURE — 85027 COMPLETE CBC AUTOMATED: CPT

## 2021-12-20 PROCEDURE — 1036F TOBACCO NON-USER: CPT | Performed by: FAMILY MEDICINE

## 2021-12-20 PROCEDURE — G8428 CUR MEDS NOT DOCUMENT: HCPCS | Performed by: FAMILY MEDICINE

## 2021-12-20 PROCEDURE — 4040F PNEUMOC VAC/ADMIN/RCVD: CPT | Performed by: FAMILY MEDICINE

## 2021-12-20 PROCEDURE — 83970 ASSAY OF PARATHORMONE: CPT

## 2021-12-20 PROCEDURE — G8420 CALC BMI NORM PARAMETERS: HCPCS | Performed by: FAMILY MEDICINE

## 2021-12-20 PROCEDURE — 84156 ASSAY OF PROTEIN URINE: CPT

## 2021-12-20 PROCEDURE — 99212 OFFICE O/P EST SF 10 MIN: CPT | Performed by: FAMILY MEDICINE

## 2021-12-20 PROCEDURE — 80069 RENAL FUNCTION PANEL: CPT

## 2021-12-20 NOTE — PROGRESS NOTES
Pt presents for suture removal.    Physical Exam   /68 (Site: Left Upper Arm, Position: Sitting, Cuff Size: Medium Adult)   Pulse 71   Temp 97.2 °F (36.2 °C) (Infrared)   Resp 16   SpO2 93%     Constitutional: She appears well-developed and well-nourished. No distress. Biopsy results discussed with pt NA  the wound has some drainage present and the center portion of the Y laceration is not healing and has a scab over the top. There are several sutures in this site that really are not attached and easily removed. She has several other sutures that also removed. Assessment: suture removal    Plan  Wound care instructions provided  Patient was instructed to be alert for any signs of cutaneous infection.

## 2021-12-20 NOTE — PROGRESS NOTES
Subjective:      Patient ID: Vivian Lagunas is a 80 y.o. female. CC: Patient presents for acute medical problem-*** . Medical assistant notes reviewed. HPI Pt presents for suture removal.    Physical Exam   /68 (Site: Left Upper Arm, Position: Sitting, Cuff Size: Medium Adult)   Pulse 71   Temp 97.2 °F (36.2 °C) (Infrared)   Resp 16   SpO2 93%     Constitutional: She appears well-developed and well-nourished. No distress. Biopsy results discussed with pt {YES/NO:19732}  {PROC SUTURE/STAPLE OTHER INFO:20297}  Sutures removed {PROC SUTURE/STAPLE REMOVAL WITH/WITHOUT DIFFICULTY:20296}    Assessment: suture removal    Plan  {INSTRUCTIONS:0715213321::\"Wound care instructions provided\",\"Patient was instructed to be alert for any signs of cutaneous infection. \",\"Follow-up with *** in *** days/weeks for wound check. \"}    Review of Systems    Objective:   Physical Exam    Assessment:      ***      Plan:      ***

## 2021-12-30 ENCOUNTER — HOSPITAL ENCOUNTER (OUTPATIENT)
Dept: CT IMAGING | Age: 81
Discharge: HOME | End: 2021-12-30
Payer: MEDICARE

## 2021-12-30 DIAGNOSIS — M50.20 CERVICAL DISC HERNIATION: ICD-10-CM

## 2021-12-30 PROCEDURE — 72131 CT LUMBAR SPINE W/O DYE: CPT

## 2022-01-05 ENCOUNTER — OFFICE VISIT (OUTPATIENT)
Dept: CARDIOLOGY CLINIC | Age: 82
End: 2022-01-05
Payer: MEDICARE

## 2022-01-05 ENCOUNTER — NURSE ONLY (OUTPATIENT)
Dept: CARDIOLOGY CLINIC | Age: 82
End: 2022-01-05
Payer: MEDICARE

## 2022-01-05 ENCOUNTER — TELEPHONE (OUTPATIENT)
Dept: CARDIOLOGY CLINIC | Age: 82
End: 2022-01-05

## 2022-01-05 ENCOUNTER — HOSPITAL ENCOUNTER (OUTPATIENT)
Age: 82
Discharge: HOME | End: 2022-01-05
Payer: MEDICARE

## 2022-01-05 VITALS
OXYGEN SATURATION: 98 % | SYSTOLIC BLOOD PRESSURE: 134 MMHG | HEIGHT: 63 IN | BODY MASS INDEX: 22.68 KG/M2 | WEIGHT: 128 LBS | HEART RATE: 76 BPM | DIASTOLIC BLOOD PRESSURE: 70 MMHG

## 2022-01-05 DIAGNOSIS — I25.5 ISCHEMIC CARDIOMYOPATHY: ICD-10-CM

## 2022-01-05 DIAGNOSIS — I47.1 PAT (PAROXYSMAL ATRIAL TACHYCARDIA) (HCC): ICD-10-CM

## 2022-01-05 DIAGNOSIS — I48.0 PAF (PAROXYSMAL ATRIAL FIBRILLATION) (HCC): ICD-10-CM

## 2022-01-05 DIAGNOSIS — N28.9 RENAL INSUFFICIENCY: ICD-10-CM

## 2022-01-05 DIAGNOSIS — R00.1 SYMPTOMATIC BRADYCARDIA: ICD-10-CM

## 2022-01-05 DIAGNOSIS — Z95.0 PACEMAKER: ICD-10-CM

## 2022-01-05 DIAGNOSIS — I49.5 SICK SINUS SYNDROME (HCC): ICD-10-CM

## 2022-01-05 DIAGNOSIS — R39.198 DIFFICULTY URINATING: ICD-10-CM

## 2022-01-05 DIAGNOSIS — I25.810 CORONARY ARTERY DISEASE INVOLVING AUTOLOGOUS ARTERY CORONARY BYPASS GRAFT WITHOUT ANGINA PECTORIS: ICD-10-CM

## 2022-01-05 DIAGNOSIS — I50.22 CHRONIC SYSTOLIC HEART FAILURE (HCC): Primary | ICD-10-CM

## 2022-01-05 DIAGNOSIS — Z95.818 PRESENCE OF WATCHMAN LEFT ATRIAL APPENDAGE CLOSURE DEVICE: ICD-10-CM

## 2022-01-05 DIAGNOSIS — I50.22 CHRONIC SYSTOLIC HEART FAILURE (HCC): ICD-10-CM

## 2022-01-05 LAB
ANION GAP SERPL CALCULATED.3IONS-SCNC: 12 MMOL/L (ref 3–16)
BILIRUBIN URINE: NEGATIVE
BLOOD, URINE: NEGATIVE
BUN BLDV-MCNC: 29 MG/DL (ref 7–20)
CALCIUM SERPL-MCNC: 9.3 MG/DL (ref 8.3–10.6)
CHLORIDE BLD-SCNC: 106 MMOL/L (ref 99–110)
CLARITY: CLEAR
CO2: 23 MMOL/L (ref 21–32)
COLOR: YELLOW
CREAT SERPL-MCNC: 1.3 MG/DL (ref 0.6–1.2)
EPITHELIAL CELLS, UA: 1 /HPF (ref 0–5)
GFR AFRICAN AMERICAN: 48
GFR NON-AFRICAN AMERICAN: 39
GLUCOSE BLD-MCNC: 85 MG/DL (ref 70–99)
GLUCOSE URINE: NEGATIVE MG/DL
HYALINE CASTS: 0 /LPF (ref 0–8)
KETONES, URINE: NEGATIVE MG/DL
LEUKOCYTE ESTERASE, URINE: ABNORMAL
MICROSCOPIC EXAMINATION: YES
NITRITE, URINE: NEGATIVE
PH UA: 5 (ref 5–8)
POTASSIUM SERPL-SCNC: 4.2 MMOL/L (ref 3.5–5.1)
PRO-BNP: 3042 PG/ML (ref 0–449)
PROTEIN UA: NEGATIVE MG/DL
RBC UA: 0 /HPF (ref 0–4)
SODIUM BLD-SCNC: 141 MMOL/L (ref 136–145)
SPECIFIC GRAVITY UA: 1.01 (ref 1–1.03)
T4 FREE: 1.4 NG/DL (ref 0.9–1.8)
TSH REFLEX: 5.29 UIU/ML (ref 0.27–4.2)
URINE REFLEX TO CULTURE: ABNORMAL
URINE TYPE: ABNORMAL
UROBILINOGEN, URINE: 0.2 E.U./DL
WBC UA: 2 /HPF (ref 0–5)

## 2022-01-05 PROCEDURE — 80048 BASIC METABOLIC PNL TOTAL CA: CPT

## 2022-01-05 PROCEDURE — G8399 PT W/DXA RESULTS DOCUMENT: HCPCS | Performed by: CLINICAL NURSE SPECIALIST

## 2022-01-05 PROCEDURE — 81001 URINALYSIS AUTO W/SCOPE: CPT

## 2022-01-05 PROCEDURE — 4040F PNEUMOC VAC/ADMIN/RCVD: CPT | Performed by: CLINICAL NURSE SPECIALIST

## 2022-01-05 PROCEDURE — 36415 COLL VENOUS BLD VENIPUNCTURE: CPT

## 2022-01-05 PROCEDURE — 83880 ASSAY OF NATRIURETIC PEPTIDE: CPT

## 2022-01-05 PROCEDURE — 81003 URINALYSIS AUTO W/O SCOPE: CPT | Performed by: CLINICAL NURSE SPECIALIST

## 2022-01-05 PROCEDURE — 1123F ACP DISCUSS/DSCN MKR DOCD: CPT | Performed by: CLINICAL NURSE SPECIALIST

## 2022-01-05 PROCEDURE — G8484 FLU IMMUNIZE NO ADMIN: HCPCS | Performed by: CLINICAL NURSE SPECIALIST

## 2022-01-05 PROCEDURE — G8420 CALC BMI NORM PARAMETERS: HCPCS | Performed by: CLINICAL NURSE SPECIALIST

## 2022-01-05 PROCEDURE — 1036F TOBACCO NON-USER: CPT | Performed by: CLINICAL NURSE SPECIALIST

## 2022-01-05 PROCEDURE — 84439 ASSAY OF FREE THYROXINE: CPT

## 2022-01-05 PROCEDURE — 1090F PRES/ABSN URINE INCON ASSESS: CPT | Performed by: CLINICAL NURSE SPECIALIST

## 2022-01-05 PROCEDURE — G8427 DOCREV CUR MEDS BY ELIG CLIN: HCPCS | Performed by: CLINICAL NURSE SPECIALIST

## 2022-01-05 PROCEDURE — 84443 ASSAY THYROID STIM HORMONE: CPT

## 2022-01-05 PROCEDURE — 99214 OFFICE O/P EST MOD 30 MIN: CPT | Performed by: CLINICAL NURSE SPECIALIST

## 2022-01-05 NOTE — PROGRESS NOTES
[address]  Progress Note    Primary Care Doctor:  Stanton Cortes MD    Chief Complaint   Patient presents with    Follow-up    Congestive Heart Failure    Palpitations    Shortness of Breath        History of Present Illness:  80 y.o. female with history of Ms. Edson Polk She has a history of atrial fibrillation (on xarelto), TIA, chronic kidney disease, CHF, hypertension, hyperlipidemia, mitral valve disease, hypothyroidism. Elis Jon last LVEF was 45% on 7/19/19. CABG, 7/08 cath- patent LIMA to LAD, SVG occluded to RCA; 8/2019 left to right fem-fem bypass and left fem to above knee popliteal bypass  Pacemaker generator change 1/25/21  Watchman implanted 5/17/2021    I had the pleasure of seeing Randa Manning in follow up for systolic heart failure and earlier appt due to being tired, dizziness and feeling like AF. She is in a wheelchair with her daughter. She states she missed her am meds yesterday (metoprolol). She feels burning when she urinates. Her EKG today shows sinus with paced beats. Device check shows some ST yesterday. She denies any chest pain or increased shortness of breath.       Past Medical History:   has a past medical history of Allergic rhinitis, cause unspecified, Arthritis, Atrial fibrillation ([address] Bronchopneumonia, CAD (coronary artery disease), Cerebral artery occlusion with cerebral infarction ([address] Chronic gouty arthropathy, Chronic kidney disease, Congestive heart failure, unspecified, Degeneration of cervical intervertebral disc, Essential and other specified forms of tremor, Gout, HIGH CHOLESTEROL, History of CVA (cerebrovascular accident), Hx of blood clots, Hypertension, Influenza, Mitral valve stenosis and aortic valve insufficiency, Movement disorder, Pacemaker, Peptic ulcer, unspecified site, unspecified as acute or chronic, without mention of hemorrhage, perforation, or obstruction, Thyroid disease, Unspecified disorder of kidney and ureter, and Unspecified hypothyroidism. Surgical History:   has a past surgical history that includes back surgery; Cholecystectomy; Cardiac surgery; Coronary artery bypass graft (1987); shoulder surgery; Cardiac catheterization (7/2012); hip surgery (Left, 03/16/2017); joint replacement; Cardiac catheterization (08/07/2018); Percutaneous Transluminal Coronary Angio (11/04/2014); pr njx aa&/strd tfrml epi lumbar/sacral 1 level (Right, 12/3/2018); Femoral-femoral Bypass Graft (N/A, 8/22/2019); femoral bypass (Left, 8/22/2019); and IR KYPHOPLASTY LUMBAR 1 VERTEBRAL BODY (5/14/2021). Social History:   reports that she quit smoking about 35 years ago. Her smoking use included cigarettes. She has a 28.00 pack-year smoking history. She has never used smokeless tobacco. She reports previous alcohol use. She reports that she does not use drugs. Family History:   Family History   Problem Relation Age of Onset    Cancer Sister     Heart Disease Sister     Diabetes Brother     Hypertension Brother     Heart Disease Brother     Stroke Maternal Aunt     Heart Disease Maternal Aunt     Diabetes Maternal Uncle     Hypertension Paternal Aunt     Cancer Maternal Grandmother     Cancer Paternal Grandmother     Rheum Arthritis Neg Hx     Lupus Neg Hx        Home Medications:  Prior to Admission medications    Medication Sig Start Date End Date Taking? Authorizing Provider   levothyroxine (SYNTHROID) 112 MCG tablet TAKE 1 TABLET EVERY DAY (NEED MD APPOINTMENT FOR REFILLS) 12/17/21  Yes Jj Drummond MD   metoprolol succinate (TOPROL XL) 25 MG extended release tablet Take 0.5 tablets by mouth daily . 5 tablet daily 12/6/21  Yes TIFFANIE Dillon - CNP   topiramate (TOPAMAX) 50 MG tablet TAKE 2 TABLETS TWICE DAILY 11/29/21  Yes Jj Drummond MD   vitamin D (ERGOCALCIFEROL) 1.25 MG (09721 UT) CAPS capsule Take 1 capsule by mouth once a week 9/20/21  Yes Jj Drummond MD   acetaminophen (TYLENOL) 325 MG tablet Take 1,000 mg by mouth daily  4/5/21  Yes Historical Provider, MD   clopidogrel (PLAVIX) 75 MG tablet Take 1 tablet by mouth daily 7/7/21  Yes Miguel Romero MD   allopurinol (ZYLOPRIM) 100 MG tablet TAKE 1 TABLET EVERY DAY (ALONG WITH 300MG TABLET) 5/25/21  Yes Eve Aparicio MD   torsemide (DEMADEX) 10 MG tablet TAKE 1 TABLET EVERY OTHER DAY ALTERNATING WITH  2  TABLETS EVERY OTHER DAY  Patient taking differently: Take 20 mg by mouth daily TAKE 1 TABLET EVERY OTHER DAY ALTERNATING WITH  2  TABLETS EVERY OTHER DAY    PATIENT TAKING 20 MG DAILY FOR 5 DAYS THEN RETURN TO ALTERNATE 5/25/21  Yes Eve Aparicio MD   aspirin EC 81 MG EC tablet Take 1 tablet by mouth daily 5/17/21  Yes TIFFANIE Acuña - CNP   nitroGLYCERIN (NITROLINGUAL) 0.4 MG/SPRAY 0.4 mg spray Place 1 spray under the tongue every 5 minutes as needed for Chest pain 2/4/21  Yes Haile Whittaker MD   betamethasone valerate (VALISONE) 0.1 % cream Apply topically 2 times daily. Patient not taking: Reported on 1/5/2022 6/29/21   Eve Aparicio MD        Allergies:  Aspirin, Diltiazem, Diltiazem hcl, Lorazepam, Sulfa antibiotics, Atorvastatin, Dabigatran, Mysoline [primidone], Nsaids, Other, and Primidone     Review of Systems:   · Constitutional: there has been no unanticipated weight loss. There's been no change in energy level, sleep pattern, or activity level. · Eyes: No visual changes or diplopia. No scleral icterus. · ENT: No Headaches, hearing loss or vertigo. No mouth sores or sore throat. · Cardiovascular: Reviewed in HPI  · Respiratory: No cough or wheezing, no sputum production. No hematemesis. · Gastrointestinal: No abdominal pain, appetite loss, blood in stools. No change in bowel or bladder habits. States she feels bloated   · Genitourinary: No dysuria, trouble voiding, or hematuria. · Musculoskeletal:  No gait disturbance, weakness or joint complaints. · Integumentary: No rash or pruritis.   · Neurological: No headache, diplopia, change in muscle strength, numbness or tingling. No change in gait, balance, coordination, mood, affect, memory, mentation, behavior. · Psychiatric: No anxiety, no depression. · Endocrine: No malaise, fatigue or temperature intolerance. No excessive thirst, fluid intake, or urination. No tremor. · Hematologic/Lymphatic: No abnormal bruising or bleeding, blood clots or swollen lymph nodes. · Allergic/Immunologic: No nasal congestion or hives. Physical Examination:    Vitals:    01/05/22 1309   BP: 134/70   Site: Right Upper Arm   Position: Sitting   Cuff Size: Medium Adult   Pulse: 76   SpO2: 98%   Weight: 128 lb (58.1 kg)   Height: 5' 3\" (1.6 m)        Constitutional and General Appearance: Warm and dry, no apparent distress, normal coloration  HEENT:  Normocephalic, atraumatic  Respiratory:  · Normal excursion and expansion without use of accessory muscles  · Resp Auscultation: Normal breath sounds without dullness  Cardiovascular:  · The apical impulses not displaced  · Heart tones are crisp and normal  · JVP normal H2O  · regular rhythm  · Peripheral pulses are symmetrical and full  · There is no clubbing, cyanosis of the extremities.   · No ankle edema  · Pedal Pulses: 2+ and equal   Abdomen:  · No masses or tenderness  · Liver/Spleen: No Abnormalities Noted  Neurological/Psychiatric:  · Alert and oriented in all spheres  · Moves all extremities well  · Exhibits normal gait balance and coordination  · No abnormalities of mood, affect, memory, mentation, or behavior are noted    Lab Data:    CBC:   Lab Results   Component Value Date    WBC 10.5 12/20/2021    WBC 11.9 08/02/2021    WBC 17.6 05/19/2021    RBC 5.68 12/20/2021    RBC 5.35 10/20/2021    RBC 5.36 08/02/2021    HGB 14.8 12/20/2021    HGB 14.1 08/02/2021    HGB 13.4 05/19/2021    HCT 47.4 12/20/2021    HCT 45.4 08/02/2021    HCT 42.5 05/19/2021    MCV 83.5 12/20/2021    MCV 81.2 10/20/2021    MCV 84.7 08/02/2021    RDW 22.3 12/20/2021    RDW 20.5 10/20/2021    RDW 19.3 08/02/2021     12/20/2021     08/02/2021     05/19/2021     BMP:  Lab Results   Component Value Date     12/20/2021     10/20/2021     09/01/2021    K 4.9 12/20/2021    K 4.1 10/20/2021    K 4.6 09/01/2021    K 4.9 12/16/2020    K 4.2 11/07/2020    K 4.1 03/25/2020     12/20/2021     10/20/2021     09/01/2021    CO2 21 12/20/2021    CO2 23 10/20/2021    CO2 23 09/01/2021    PHOS 4.1 12/20/2021    PHOS 3.2 10/20/2021    PHOS 4.0 09/01/2021    BUN 44 12/20/2021    BUN 41 10/20/2021    BUN 41 09/01/2021    CREATININE 1.6 12/20/2021    CREATININE 1.9 09/01/2021    CREATININE 1.7 08/16/2021     BNP:   Lab Results   Component Value Date    PROBNP 6,300 08/16/2021    PROBNP 2,071 08/02/2021    PROBNP 2,170 11/07/2020     Cardiac Imaging:  DONA 7/7/2021  Summary   Ejection fraction is visually estimated to be 45-50 %. Mild thickening of anterior, posterior leaflet of mitral valve. There is decreased leaflet motion and \"hockey stick\" appearance of the   mitral leaflets. posterior leaflet is immobile. Mean Gradient 4 mmHg. Moderate mitral regurgitation is present. There is no evidence of mass or thrombus in the left atrium or appendage. Watchman in place. No thrombus. <3 mm leak. The left atrium is dilated. A PFO is present. Aortic valve leaflets appear thickened. Tricuspid aortic valve. Mild aortic regurgitation is present. Plaque is noted in the thoracic aorta. Moderate tricuspid regurgitation.  PASP 51 mmHg    [facility] 3/19/2021 Dr Lio Kaur  Artery Findings/Result   LM Normal   LAD 50% ostial, 50% mid instent   Cx 50% mid at OM1 bifurcation, 30% OM1 mid at bifurcation   RI NA   % prox with multiple R-R collaterals   LVEDP 8   LVG NA due to renal failure      Intervention:         None     Post Cath Dx:       Stable CAD  Possible angina from  of RCA - poor candidate for  intervention with renal failure    Echo 4/30/2020   Left ventricular cavity size is normal. Normal left ventricular wall   thickness. Left ventricular function appears to be reduced with an estimated   ejection fraction of 45%. Diffuse hypokinesis. Echo 7/19/2019   Summary    Left ventricle - normal size, thickness, reduced function with EF of 45%  LV wall motion - diffuse hypokinesis. Mitral valve - thickened, annular calcification, moderate regurgitation  Aortic valve - thickened, calcified, mild regurgitation  Tricuspid valve - mild regurgitation with PASP of 25mmHg  Pulmonic valve - trivial regurgitation   Biatrial enlargement  Pacemaker / ICD lead is visualized in the right heart. 6/2019 Dr Charu Rae  NSTEMI: . Angiogram findings were as below:      Findings:                      LM       Normal              LAD     50% ostial, mid 100%              Cx        40% OM1 at bifurcation              RCA     Mid 100%              LVG     Not performed              EDP     15 mmHg              L-LAD  Patent              S-PDA Known occluded  Severe Ca++:None  Post Cath Dx:Stable CAD, no apparent culprit for NSTEMI  Intervention:  None  Med Rec:        Continue aggressive med tx                          Continue plavix, no asa due to allergy. statin added. LDL 75   8/7/18  The PCI of complex proximal RCA chronic total occlusion was unsuccessful (J- score 3). Underwent successful Angioplasty of high-grade proximal RCA lesion proximal to the . RECOMMENDATIONS:  Attempt on this complex long  was unsuccessful. Lack   Of retrograde collaterals along with a very ambiguous cap as well as a large RV marginal branch and bridging collateral coming off at the cap makes it a  challenging repeat attempt. If she continues to have angina, it is recommended to proceed with the single-vessel SVG to the RCA.      Echo: 2/17/16 ([facility])  Conclusions: Normal LV size and systolic function Mild to moderate mitral  regurgitation Mild tricuspid regurgitation  Findings:   Left Atrium: Mild to moderate enlargement of the left atrium. Left Ventricle: Upper limits of normal left ventricle size. No left ventricular  hypertrophy. Normal left ventricular systolic function. The ejection fraction   Is visually estimated to be 55 %. Right Atrium: Normal right atrial size. RightVentricle: Normal right ventricle size. Normal right ventricular function. Aortic Valve: Tri-leaflet aortic valve. Mild aortic cusp calcification. No  aortic valve stenosis. Mild (1+) aortic valve regurgitation. Aorta: No dilatation of the aortic root. IVC/PA: Normal IVC size and normal respiratory  collapse consistent with normal right atrial pressure. Mitral Valve: Mild mitral valve leaflet calcification. Mild to moderate (2+) mitral valve  regurgitation. No mitral valve stenosis. Tricuspid Valve: Mild (1+) tricuspid  regurgitation. The pulmonary artery pressure is normal.   Pulmonic Valve: Mild  pulmonic regurgitation. Pericardium: Normal pericardium with no significant  pericardial effusion. Assessment:    1. Chronic systolic heart failure (HCC) on bb, no ace/arb/aldosterone antagonist due to hypotension   2. Chronic atrial fibrillation s/p watchman Dr Shabnam West   3. Coronary artery disease involving autologous artery coronary bypass graft without angina pectoris    4. Renal insufficiency follows with Dr Katie Marinelli  5. Difficulty urinating    Plan:   1. Check pacemaker today  2. Continue all medications  3. Check blood work today along with urine  4. Keep appt in March    She remains off losartan and aldactone due to hypotension    I appreciate the opportunity of cooperating in the care of this individual.    TIFFANIE Mcneil - CNS, CNS, 1/5/2022, 1:48 PM    QUALITY MEASURES  1. Tobacco Cessation Counseling: NA  2. Retake of BP if >140/90:   NA  3. Documentation to PCP/referring for new patient:  Sent to PCP at close of office visit  4.  CAD patient on anti-platelet: Yes  5. CAD patient on STATIN therapy:  No did not tolerate  6.  Patient with CHF and aFib on anticoagulation:  Yes

## 2022-01-05 NOTE — TELEPHONE ENCOUNTER
Pt calling stating she is not feeling well. Pt states that she is hoarse. She states that her heart is beating irregularity. She says it'll beat 3 times then there is an extra beat. Pt states that it is repeatedly doing this. Pt states she does not feel well at all, whenever she stands her head feels funny. Pt very adamantly stated that she does not want to go to the ER or call an ambulance and that she prefers to speak to [facility].  Pls call to advise thank you

## 2022-01-05 NOTE — PATIENT INSTRUCTIONS
1. Check pacemaker today  2. Continue all medications  3. Check blood work today along with urine  4.   Keep appt in March

## 2022-01-05 NOTE — PROGRESS NOTES
Lm for pt to call re glc test results.   Patient comes in for programming evaluation for her pacemaker. All sensing and pacing parameters are within normal range. Battery life 14.5 years  %.  0%. Short AT/AF episodes noted. Patient remains on metoprolol. Patient has Watchman and is off AC medications. Please see interrogation for more detail. Patient will see Valeriy Alcaraz today and follow up in 3 months office or remotely.

## 2022-01-06 ENCOUNTER — TELEPHONE (OUTPATIENT)
Dept: CARDIOLOGY CLINIC | Age: 82
End: 2022-01-06

## 2022-01-06 PROCEDURE — 93280 PM DEVICE PROGR EVAL DUAL: CPT | Performed by: INTERNAL MEDICINE

## 2022-01-06 NOTE — TELEPHONE ENCOUNTER
----- Message from TIFFANIE Winchester - CNS sent at 1/6/2022  8:44 AM EST -----  See how she is feeling today  Urine was fine  Labs are good, thyroid I will send to Dr Gilda Schwartz, looks normal  thanks

## 2022-01-06 NOTE — TELEPHONE ENCOUNTER
Spoke to patient and she is feeling much better today. Dizzy occasionally she is being very careful. She was having the irregular heart beats and it makes her feel terrible. Her home monitor has not been transmitting her irregular heart beats, she feels her transmission are not showing these irregular heart beats.  Will ask Catrachita Hitchcock for the [employer] help desk number so she will be able to call and put in a help desk ticket about her home monitor

## 2022-01-07 ENCOUNTER — TELEPHONE (OUTPATIENT)
Dept: CARDIOLOGY CLINIC | Age: 82
End: 2022-01-07

## 2022-01-07 DIAGNOSIS — Z01.818 PREOP TESTING: Primary | ICD-10-CM

## 2022-01-07 NOTE — TELEPHONE ENCOUNTER
Order placed for Covid Test for DONA with [facility]. Called [facility] to let them know orders have been placed.  Call completed

## 2022-01-07 NOTE — TELEPHONE ENCOUNTER
Pt is at [facility] to get COVID test for her scheduled DONA 1/12, [facility] needs order [facility] to do the test.    Pls advise thank you

## 2022-01-08 LAB — SARS-COV-2: NOT DETECTED

## 2022-01-12 ENCOUNTER — TELEPHONE (OUTPATIENT)
Dept: CARDIOLOGY CLINIC | Age: 82
End: 2022-01-12

## 2022-01-12 ENCOUNTER — HOSPITAL ENCOUNTER (OUTPATIENT)
Dept: CARDIAC CATH/INVASIVE PROCEDURES | Age: 82
Discharge: HOME | End: 2022-01-12
Attending: INTERNAL MEDICINE | Admitting: INTERNAL MEDICINE
Payer: MEDICARE

## 2022-01-12 VITALS
HEART RATE: 80 BPM | RESPIRATION RATE: 19 BRPM | SYSTOLIC BLOOD PRESSURE: 168 MMHG | BODY MASS INDEX: 22.68 KG/M2 | WEIGHT: 128 LBS | DIASTOLIC BLOOD PRESSURE: 62 MMHG | TEMPERATURE: 97.5 F | HEIGHT: 63 IN

## 2022-01-12 LAB
LV EF: 58 %
LVEF MODALITY: NORMAL

## 2022-01-12 PROCEDURE — 7100000010 HC PHASE II RECOVERY - FIRST 15 MIN

## 2022-01-12 PROCEDURE — 93325 DOPPLER ECHO COLOR FLOW MAPG: CPT

## 2022-01-12 PROCEDURE — 93312 ECHO TRANSESOPHAGEAL: CPT

## 2022-01-12 PROCEDURE — 93320 DOPPLER ECHO COMPLETE: CPT

## 2022-01-12 PROCEDURE — 99152 MOD SED SAME PHYS/QHP 5/>YRS: CPT

## 2022-01-12 PROCEDURE — 99152 MOD SED SAME PHYS/QHP 5/>YRS: CPT | Performed by: INTERNAL MEDICINE

## 2022-01-12 RX ORDER — SODIUM CHLORIDE 0.9 % (FLUSH) 0.9 %
5-40 SYRINGE (ML) INJECTION PRN
Status: DISCONTINUED | OUTPATIENT
Start: 2022-01-12 | End: 2022-01-12 | Stop reason: HOSPADM

## 2022-01-12 NOTE — H&P
[address]   Electrophysiology Follow up   Date: 1/12/2022    CC: Frequent falls    HPI: Gregorio Lowe is a 80 y.o. female with a history of paroxysmal atrial fib and underwent cardioversion in 2008. She had recurrent Atrial fib and underwent RFCA on 4/24/09 by Dr. Brandon Brooks. .    Underwent dual chamber pacemaker Annville Stanley) on 11/8/13 for sick sinus syndrome , and AV block reported at [facility] 1490.      She is s/p  generator change out on 01/25/2021. Noted atrial lead issues, tried to repair the lead which was not effective and had a new RA lead inserted. She has significant Coronary disease with history of CABG and multiple interventions. On 02/02/2017 she had a LHC which showed chronic occlusion of the LAD and has seen interventional cardiology for potential interventions. She was seen on 08/02/2017 and reported having paloitations and chest pain for one months. ECG was done which showed atrial flutter irate in the 140's. She is anticoagulated with Xarelto. She underwent Cardioversion 08/02/2017. She had a PFT that showed severe restrictive disease and she has not followed up with Pulmonology. She was encouraged to see Dr. Glen Groves. She was a previous smoker with suspected COPD and amiodarone is not an option for rate control. She is followed by heart failure. She has had recurrent falls. 12/16/2020  Admission at [facility] for concerns of left kidney contusion s/p fall. Underwent Watchman procedure on 5/17/2021. Follow up DONA with less than 3 mm feliciano-device leak. She is here for follow up DONA. Assessment and plan:      Paroxysmal atrial fibrillation   Patient has high [id number] score 6 and requires anticoagulation to prevent thromboembolic events. [id number] Score: 5  S/p Watchman device in 5/2021. Follow up DONA with less than 3 mm leak. Here for DONA. Risks, benefits and alternative of procedure were explained.  All questions answered. Patient understood and would like to proceed. Recurrent atrial flutter/tachycardia/fibrillation   EKG today Sinus with first degree block   Antiarrythmic therapy is limited since she has extensive structural heart disease. Amiodarone is not indicated because she has severe abnormal PFT. S/p DCCV 08/02/2017   S/p RFCA for Afib at [facility] by Dr. Betsy Pena 2009   Toprol XL 25 mg BID   Xarelto 10 mg Creatinine Clearance 27 ml/min   High LQA7XY2-Rthp score and high risk for stroke and thromboembolism      Atrial fib burden on device today is 5%. Longest 14 hours  . Shortness of Breath/ Severe COPD    Has not seen Dr. Ashlee Fortune   PFT 2018 shows severe restrictive lung disease   ECHO 04/30/2020 LVEF 45 %      Bradycardia/Sinus node dysfunction   S/p dual chamber pacemaker, generator change with new RA lead 01/25/2021    The CIED was interrogated and programmed and I supervised and reviewed all the data. All findings and changes are in device interrogation sheet and reflect my personal interpretation and changes and is scanned to Epic. AP 82%,  5%, JSUTIN 14  years    CAD   Stable    On BB. Allergy ASA    Plavix DCd 12/21/2020      HTN  -Controlled  -BP goal <130/80  -Home BP monitoring encouraged, printed information provided on how to accurately measure BP at home.  -Counseled to follow a low salt diet to assure blood pressure remains controlled for cardiovascular risk factor modification.   -The patient is counseled to get regular exercise 3-5 times per week and maintain a healthy weight reduce cardiovascular risk factors.         Chronic Systolic Heart failure   Followed by heart failure  Echo 04/30/2020 LVEF 45 %  Aldactone 12.5 mg 3 times weekly  Toprol XL BID      Patient Active Problem List    Diagnosis Date Noted    Pacemaker 04/24/2017    Cerebrovascular accident (CVA) ([facility] [address]     HTN (hypertension), benign     Fibrocystic breast 03/03/2017    Allergic rhinitis 05/26/2015    Sick sinus syndrome valve - mild regurgitation with PASP of 25mmHg     Pulmonic valve - trivial regurgitation     Biatrial enlargement     Pacemaker / ICD lead is visualized in the right heart. I independently reviewed the cardiac diagnostic studies, ECG and relevant imaging studies. [facility] 05/20/2019  Findings:                      LM       Normal              LAD     50% ostial, mid 100%              Cx        40% OM1 at bifurcation              RCA     Mid 100%              LVG     Not performed              EDP     15 mmHg              L-LAD  Patent              S-PDA Known occluded  Severe Ca++:None  Post Cath Dx:Stable CAD, no apparent culprit for NSTEMI  Intervention:  None  Med Rec:        Continue aggressive med tx                          Continue plavix, no asa due     Cath: 2/3/17 showed 100% prox RCA with bridging collateral, 100% SVG to RCA, 100% prox LAD after D1, D1 stent widely patent, Mild Cx disease, Normal LV FXN--EF 60%--EDP 12 post hydration.               Stable anatomy       Nuc stress 6/28/16: ([facility])   Final Conclusions/Summary  Stress ECG Impressions: No significant ST changes during vasodilator infusion  Summary: - Abnormal moderat risk stress test with moderate size and severity  anterolateral wall ischemia - Normal LV size and systolic function    Lab Results   Component Value Date    LVEF 48 07/07/2021    LVEFMODE Echo 07/19/2019     Lab Results   Component Value Date    TSH 0.90 03/25/2021         Physical Examination:  Vitals:    01/12/22 1121   BP: (!) 168/62   Pulse: 80   Resp: 19   Temp: 97.5 °F (36.4 °C)      Wt Readings from Last 3 Encounters:   01/12/22 128 lb (58.1 kg)   01/05/22 128 lb (58.1 kg)   12/23/21 129 lb 9.6 oz (58.8 kg)       · Constitutional: Oriented. No distress. · Head: Normocephalic and atraumatic. · Mouth/Throat: Oropharynx is clear and moist.   · Eyes: Conjunctivae normal. EOM are normal.   · Neck: Neck supple. No JVD present.     · Cardiovascular: Normal rate, regular rhythm, S1&S2. Incision is healing, scab on the incision. Will use gentamicin   · Pulmonary/Chest: Bilateral respiratory sounds. No rhonchi. · Abdominal: Soft. No tenderness. · Musculoskeletal: No tenderness. No edema    · Lymphadenopathy: Has no cervical adenopathy. · Neurological: Alert and oriented. Follows command, No Gross deficit   · Skin: Skin is warm, No rash noted. · Psychiatric: Has a normal behavior       Review of System:  [x] Full ROS obtained and negative except as mentioned in HPI    Prior to Admission medications    Medication Sig Start Date End Date Taking? Authorizing Provider   levothyroxine (SYNTHROID) 112 MCG tablet TAKE 1 TABLET EVERY DAY (NEED MD APPOINTMENT FOR REFILLS) 12/17/21  Yes Elham Cooper MD   metoprolol succinate (TOPROL XL) 25 MG extended release tablet Take 0.5 tablets by mouth daily . 5 tablet daily 12/6/21  Yes TIFFANIE Harrington CNP   topiramate (TOPAMAX) 50 MG tablet TAKE 2 TABLETS TWICE DAILY 11/29/21  Yes Elham Cooper MD   vitamin D (ERGOCALCIFEROL) 1.25 MG (48110 UT) CAPS capsule Take 1 capsule by mouth once a week 9/20/21  Yes Elham Cooper MD   acetaminophen (TYLENOL) 325 MG tablet Take 1,000 mg by mouth daily  4/5/21  Yes Historical Provider, MD   clopidogrel (PLAVIX) 75 MG tablet Take 1 tablet by mouth daily 7/7/21  Yes Nupru Pina MD   betamethasone valerate (VALISONE) 0.1 % cream Apply topically 2 times daily.  6/29/21  Yes Elham Cooper MD   allopurinol (ZYLOPRIM) 100 MG tablet TAKE 1 TABLET EVERY DAY (ALONG WITH 300MG TABLET) 5/25/21  Yes Elham Cooper MD   torsemide (DEMADEX) 10 MG tablet TAKE 1 TABLET EVERY OTHER DAY ALTERNATING WITH  2  TABLETS EVERY OTHER DAY  Patient taking differently: Take 20 mg by mouth daily TAKE 1 TABLET EVERY OTHER DAY ALTERNATING WITH  2  TABLETS EVERY OTHER DAY    PATIENT TAKING 20 MG DAILY FOR 5 DAYS THEN RETURN TO ALTERNATE 5/25/21  Yes Elham Cooper MD   aspirin EC 81 MG EC tablet Take 1 tablet by mouth daily 5/17/21  Yes Sylvain Dry, APRN - CNP   nitroGLYCERIN (NITROLINGUAL) 0.4 MG/SPRAY 0.4 mg spray Place 1 spray under the tongue every 5 minutes as needed for Chest pain 2/4/21  Yes Haile Whittaker MD       Allergies   Allergen Reactions    Aspirin Nausea Only    Diltiazem Anaphylaxis    Diltiazem Hcl Anaphylaxis    Lorazepam Other (See Comments)     hallucinations  hallucinations  hallucinations    Sulfa Antibiotics Rash and Hives    Atorvastatin Other (See Comments)     Muscle pains  Muscle pains  Muscle pains    Dabigatran Nausea Only     And indigestion  And indigestion    Aka Pradaxa  And indigestion  And indigestion  And indigestion    Aka Pradaxa  And indigestion  And indigestion  And indigestion    Aka Pradaxa    Mysoline [Primidone]     Nsaids      Other reaction(s): Unknown    Other Other (See Comments)     Nitroglycerin patches causes severe headaches    Primidone      Other reaction(s): Unknown       Social History:  Reviewed. reports that she quit smoking about 35 years ago. Her smoking use included cigarettes. She has a 28.00 pack-year smoking history. She has never used smokeless tobacco. She reports previous alcohol use. She reports that she does not use drugs. Family History:  Reviewed. Reviewed. No family history of SCD. Relevant and available labs, and cardiovascular diagnostics reviewed. Reviewed. I independently reviewed relevant and available cardiac diagnostic tests ECG, CXR, Echo, Stress test, Device interrogation, Holter, CT scan. - The patient is counseled to follow a low salt diet to assure blood pressure remains controlled for cardiovascular risk factor modification.   - The patient is counseled to avoid excess caffeine, and energy drinks as this may exacerbated ectopy and arrhythmia. - The patient is counseled to get regular exercise 3-5 times per week to control cardiovascular risk factors.    - The patient is counseled to lose carla to control cardiovascular risk factors. - The patient is counseled to avoid tobacco use. All questions and concerns were addressed to the patient/family. Alternatives to my treatment were discussed. I have discussed the above stated plan and the patient verbalized understanding and agreed with the plan. NOTE: This report was transcribed using voice recognition software. Every effort was made to ensure accuracy, however, inadvertent computerized transcription errors may be present. oJse Alberto Carrillo MD, MPH  [facility]   Office: [phone]  Fax: [phone]      H&P Update    I have reviewed the history and physical and examined the patient and updated with relevant changes. Consent: I have discussed with the patient and/or the patient representative the indication, alternatives, and the possible risks and/or complications of the planned procedure and the anesthesia methods. The patient and/or patient representative appear to understand and agree to proceed. Vitals:    01/12/22 1121   BP: (!) 168/62   Pulse: 80   Resp: 19   Temp: 97.5 °F (36.4 °C)     Prior to Admission medications    Medication Sig Start Date End Date Taking? Authorizing Provider   levothyroxine (SYNTHROID) 112 MCG tablet TAKE 1 TABLET EVERY DAY (NEED MD APPOINTMENT FOR REFILLS) 12/17/21  Yes Eve Aparicio MD   metoprolol succinate (TOPROL XL) 25 MG extended release tablet Take 0.5 tablets by mouth daily . 5 tablet daily 12/6/21  Yes TIFFANIE Pickering - CNP   topiramate (TOPAMAX) 50 MG tablet TAKE 2 TABLETS TWICE DAILY 11/29/21  Yes Eve Aparicio MD   vitamin D (ERGOCALCIFEROL) 1.25 MG (02042 UT) CAPS capsule Take 1 capsule by mouth once a week 9/20/21  Yes Eve Aparicio MD   acetaminophen (TYLENOL) 325 MG tablet Take 1,000 mg by mouth daily  4/5/21  Yes Historical Provider, MD   clopidogrel (PLAVIX) 75 MG tablet Take 1 tablet by mouth daily 7/7/21  Yes Miguel Romero MD   betamethasone valerate (VALISONE) 0.1 % cream Apply topically 2 times daily.  6/29/21  Yes Nii Garcia MD   allopurinol (ZYLOPRIM) 100 MG tablet TAKE 1 TABLET EVERY DAY (ALONG WITH 300MG TABLET) 5/25/21  Yes Nii Garcia MD   torsemide (DEMADEX) 10 MG tablet TAKE 1 TABLET EVERY OTHER DAY ALTERNATING WITH  2  TABLETS EVERY OTHER DAY  Patient taking differently: Take 20 mg by mouth daily TAKE 1 TABLET EVERY OTHER DAY ALTERNATING WITH  2  TABLETS EVERY OTHER DAY    PATIENT TAKING 20 MG DAILY FOR 5 DAYS THEN RETURN TO ALTERNATE 5/25/21  Yes Nii Garcia MD   aspirin EC 81 MG EC tablet Take 1 tablet by mouth daily 5/17/21  Yes TIFFANIE Schneider - CNP   nitroGLYCERIN (NITROLINGUAL) 0.4 MG/SPRAY 0.4 mg spray Place 1 spray under the tongue every 5 minutes as needed for Chest pain 2/4/21  Yes Marv Andino MD     Past Medical History:   Diagnosis Date    Allergic rhinitis, cause unspecified     Arthritis     Atrial fibrillation ([address]     Bronchopneumonia     CAD (coronary artery disease)     stent:  post cataract surgery (CABG)    Cerebral artery occlusion with cerebral infarction ([address]     TIA    Chronic gouty arthropathy     Chronic kidney disease     Congestive heart failure, unspecified     Degeneration of cervical intervertebral disc     Essential and other specified forms of tremor     Gout     HIGH CHOLESTEROL     History of CVA (cerebrovascular accident)     Hx of blood clots     Hypertension     Influenza 12/23/2017    Mitral valve stenosis and aortic valve insufficiency     Movement disorder     back problems    Pacemaker     Peptic ulcer, unspecified site, unspecified as acute or chronic, without mention of hemorrhage, perforation, or obstruction     Thyroid disease     Unspecified disorder of kidney and ureter     Unspecified hypothyroidism      Past Surgical History:   Procedure Laterality Date    BACK SURGERY      CARDIAC CATHETERIZATION  7/2012    CARDIAC CATHETERIZATION 08/07/2018    Unsuccesful  of RCA    CARDIAC SURGERY      CABG & Cardiac ablation    CHOLECYSTECTOMY      CORONARY ARTERY BYPASS GRAFT  1987    LIMA- Diag/LAD, SVG- RCA    FEMORAL BYPASS Left 8/22/2019    LEFT FEMORAL TO POPLITEAL BYPASS GRAFT performed by Cj Chino MD at [facility] [address] FEMORAL-FEMORAL BYPASS GRAFT N/A 8/22/2019    FEMORAL TO FEMORAL BYPASS performed by Cj Chino MD at [address] [address] Left 03/16/2017    Left hip pinning    IR KYPHOPLASTY LUMBAR FIRST LEVEL  5/14/2021    IR KYPHOPLASTY LUMBAR FIRST LEVEL 5/14/2021 [facility] SPECIAL PROCEDURES    JOINT REPLACEMENT      IA NJX AA&/STRD TFRML EPI LUMBAR/SACRAL 1 LEVEL Right 12/3/2018    RIGHT L3 AND L4 LUMBAR TRANSFORAMINAL EPIRUAL STEROID INJECTION WITH FLUOROSCOPY performed by Chen Coronado MD at [address] [facility]  11/04/2014    MARLENY - 3.0 x 28 to the Ist Diag    SHOULDER SURGERY      left     Allergies   Allergen Reactions    Aspirin Nausea Only    Diltiazem Anaphylaxis    Diltiazem Hcl Anaphylaxis    Lorazepam Other (See Comments)     hallucinations  hallucinations  hallucinations    Sulfa Antibiotics Rash and Hives    Atorvastatin Other (See Comments)     Muscle pains  Muscle pains  Muscle pains    Dabigatran Nausea Only     And indigestion  And indigestion    Aka Pradaxa  And indigestion  And indigestion  And indigestion    Aka Pradaxa  And indigestion  And indigestion  And indigestion    Aka Pradaxa    Mysoline [Primidone]     Nsaids      Other reaction(s): Unknown    Other Other (See Comments)     Nitroglycerin patches causes severe headaches    Primidone      Other reaction(s): Unknown       Pre-Sedation Documentation and Exam:   I have personally completed a history, physical exam & review of systems for this patient (see notes).     Mallampati Airway Assessment:  Class II     Prior History of Anesthesia Complications:   None    ASA Classification:  Class 3 - A patient with severe systemic disease that limits activity but is not incapacitating    Sedation/ Anesthesia Plan:   Intravenous sedation    Medications Planned:   Midazolam (Versed) and Fentanyl intravenously    Patient is an appropriate candidate for plan of sedation:   Yes    Electronically signed by Brandie Woody MD on 1/12/2022 at 11:51 AM

## 2022-01-12 NOTE — PROCEDURES
[facility]     Electrophysiology Procedure Note       Date of Procedure: 1/12/2022  Patient's Name: Conor Strauss  YOB: 1940   Medical Record Number: [id number]  Procedure Performed by: Eduardo Peterson MD    Procedures performed:    Trans-esophageal echocardiography    IV sedation. Start time: 12:19  Stop time: 12:37   Fentanyl: 50  Mcg  Versed: 2    Mg    An independent trained observer pushed medications at my direction    Indication of the procedure: Watchman device     Details of procedure: The patient was brought to the cath lab area in a fasting and non-sedated state. The risks, benefits and alternatives of the procedure were discussed with the patient. The patient opted to proceed with the procedure. Written informed consent was signed and placed in the chart. A timeout protocol was completed to identify the patient and the procedure being performed. IV sedation was provided with IV Versed, Fentanyl initially and DONA was performed     Preliminary DONA results are:     Watchman device is seated in [place]. An echo density was seen on the device, suggestive of device related thrombus. Will initiate anticoagulation and plan for repeat DONA in 3-4 months. Patient tolerated the procedure and no immediate complications noted.      Eduardo Peterson MD, MPH  [facility]   Office: [phone]  Fax: [phone]

## 2022-01-12 NOTE — TELEPHONE ENCOUNTER
Pt's 4/25/22 npsr appt and device needs to be rescheduled to [facility] per Cath lab. [facility] schedule is not open. I will email Mackenzie Willard to find out when schedule will be opened, Then move appt.

## 2022-01-14 ENCOUNTER — TELEPHONE (OUTPATIENT)
Dept: CARDIOLOGY CLINIC | Age: 82
End: 2022-01-14

## 2022-01-14 NOTE — TELEPHONE ENCOUNTER
Pt called asking to speak to NPSR she stated she is having a procedure done on 1/19 and wants to know if she can go off her eliquis, When ask the pt what procedure is she have done she stated she did not know it was pretty involved, it hard to explain. Pt needs to know if she can off of her eliquis and how long can she stay off of it?     Pls advise thank you

## 2022-01-14 NOTE — TELEPHONE ENCOUNTER
I spoke with pt she is having a procedure done with Dr. Arely Bonilla at [address] [facility]. I called Dr Fito Jaimes office [phone]. I had to leave a message for them to call us back with the procedure she is having done and if Eliquis needs to be held.

## 2022-01-17 NOTE — TELEPHONE ENCOUNTER
I spoke with pt . She stated she is not having the procedure now . She is CoVid positive. Will reschedule at a later time.

## 2022-01-17 NOTE — TELEPHONE ENCOUNTER
Dr. Arely Bonilla at [address] [facility] called back stating that the patient don't need to hold Elquis and the procedure that she is having is a medial branch block of the spine.

## 2022-01-19 ENCOUNTER — TELEPHONE (OUTPATIENT)
Dept: FAMILY MEDICINE CLINIC | Age: 82
End: 2022-01-19

## 2022-01-19 NOTE — TELEPHONE ENCOUNTER
----- Message from Ja Justice sent at 1/19/2022  9:06 AM EST -----  Subject: Message to Provider    QUESTIONS  Information for Provider? Dr Wilbert Stuart pt Pt calling and said that her   Rowan Lee family has SARS\" and she believes she has it and is not having   cough and congestion. She would like to know if a cough medicine can be   called into the [facility] for her to try and begin breaking up the   congestion.  ---------------------------------------------------------------------------  --------------  CALL BACK INFO  What is the best way for the office to contact you? OK to leave message on   voicemail  Preferred Call Back Phone Number? [phone]  ---------------------------------------------------------------------------  --------------  SCRIPT ANSWERS  Relationship to Patient?  Self

## 2022-01-19 NOTE — PROGRESS NOTES
Subjective:      Patient ID: Milagro Lambert is a 78 y.o. female. CC: Patient presents for acute medical problem-dizziness, nerve pain in her legs, and lumbar radiculopathy. . Medical assistant notes reviewed. Dizziness   This is a new problem. The current episode started today. The problem occurs constantly. The problem has been gradually worsening. Associated symptoms include nausea. Associated symptoms comments: Dizziness . The symptoms are aggravated by bending, walking and standing. She has tried nothing for the symptoms. Patient presents in company of her daughter. She apparently was standing for period time and suddenly she felt as if she was dizzy and feeling sick to her stomach. She did not have any syncope type episodes. She actually ate and after a while she started feeling better. She is concerned that her symptoms at nighttime cause her a lot of burning sensation in her left foot and if she takes a muscle relaxer she is tensing have as much discomfort in her right leg. She has had vascular evaluations earlier this year which did not demonstrate vascular claudication symptoms. She does use a cane for balance. She has been taking a muscle relaxant and pain pill at nighttime so she can sleep but even then awakens often with left foot discomfort and right leg pain. Thyroid testing done earlier this year was stable. She had recent laboratory assessment which did not demonstrate diabetes mellitus, B10 or folic acid deficiency. Review of Systems   Gastrointestinal: Positive for nausea. Neurological: Positive for dizziness.      Allergies   Allergen Reactions    Aspirin Nausea Only    Ativan [Lorazepam] Other (See Comments)     hallucinations    Diltiazem Anaphylaxis    Sulfa Antibiotics Rash and Hives    Dabigatran Nausea Only     And indigestion  And indigestion    Aka Pradaxa    Lipitor [Atorvastatin] Other (See Comments)     Muscle pains    Mysoline [Primidone]     Nsaids     No

## 2022-01-22 ENCOUNTER — APPOINTMENT (OUTPATIENT)
Dept: CT IMAGING | Age: 82
DRG: 149 | End: 2022-01-22
Payer: MEDICARE

## 2022-01-22 ENCOUNTER — APPOINTMENT (OUTPATIENT)
Dept: GENERAL RADIOLOGY | Age: 82
DRG: 149 | End: 2022-01-22
Payer: MEDICARE

## 2022-01-22 ENCOUNTER — HOSPITAL ENCOUNTER (INPATIENT)
Age: 82
LOS: 2 days | Discharge: HOME | DRG: 149 | End: 2022-01-24
Attending: EMERGENCY MEDICINE | Admitting: FAMILY MEDICINE
Payer: MEDICARE

## 2022-01-22 ENCOUNTER — CLINICAL DOCUMENTATION (OUTPATIENT)
Dept: CARDIOLOGY | Age: 82
End: 2022-01-22

## 2022-01-22 DIAGNOSIS — R42 DIZZINESS: Primary | ICD-10-CM

## 2022-01-22 LAB
A/G RATIO: 1.4 (ref 1.1–2.2)
ALBUMIN SERPL-MCNC: 3.8 G/DL (ref 3.4–5)
ALP BLD-CCNC: 94 U/L (ref 40–129)
ALT SERPL-CCNC: 7 U/L (ref 10–40)
ANION GAP SERPL CALCULATED.3IONS-SCNC: 11 MMOL/L (ref 3–16)
APTT: 42.5 SEC (ref 26.2–38.6)
AST SERPL-CCNC: 13 U/L (ref 15–37)
BASOPHILS ABSOLUTE: 0.2 K/UL (ref 0–0.2)
BASOPHILS RELATIVE PERCENT: 2 %
BILIRUB SERPL-MCNC: <0.2 MG/DL (ref 0–1)
BILIRUBIN URINE: NEGATIVE
BLOOD, URINE: NEGATIVE
BUN BLDV-MCNC: 32 MG/DL (ref 7–20)
CALCIUM SERPL-MCNC: 9.3 MG/DL (ref 8.3–10.6)
CHLORIDE BLD-SCNC: 108 MMOL/L (ref 99–110)
CLARITY: CLEAR
CO2: 23 MMOL/L (ref 21–32)
COLOR: YELLOW
CREAT SERPL-MCNC: 1.3 MG/DL (ref 0.6–1.2)
EOSINOPHILS ABSOLUTE: 0.4 K/UL (ref 0–0.6)
EOSINOPHILS RELATIVE PERCENT: 5.4 %
EPITHELIAL CELLS, UA: 1 /HPF (ref 0–5)
GFR AFRICAN AMERICAN: 48
GFR NON-AFRICAN AMERICAN: 39
GLUCOSE BLD-MCNC: 115 MG/DL (ref 70–99)
GLUCOSE URINE: NEGATIVE MG/DL
HCT VFR BLD CALC: 42.8 % (ref 36–48)
HEMOGLOBIN: 13.4 G/DL (ref 12–16)
HYALINE CASTS: 0 /LPF (ref 0–8)
INR BLD: 1.19 (ref 0.88–1.12)
KETONES, URINE: NEGATIVE MG/DL
LEUKOCYTE ESTERASE, URINE: ABNORMAL
LYMPHOCYTES ABSOLUTE: 1.1 K/UL (ref 1–5.1)
LYMPHOCYTES RELATIVE PERCENT: 13.7 %
MCH RBC QN AUTO: 26.2 PG (ref 26–34)
MCHC RBC AUTO-ENTMCNC: 31.4 G/DL (ref 31–36)
MCV RBC AUTO: 83.4 FL (ref 80–100)
MICROSCOPIC EXAMINATION: YES
MONOCYTES ABSOLUTE: 0.5 K/UL (ref 0–1.3)
MONOCYTES RELATIVE PERCENT: 5.9 %
NEUTROPHILS ABSOLUTE: 5.7 K/UL (ref 1.7–7.7)
NEUTROPHILS RELATIVE PERCENT: 73 %
NITRITE, URINE: NEGATIVE
PDW BLD-RTO: 21.8 % (ref 12.4–15.4)
PH UA: 5 (ref 5–8)
PLATELET # BLD: 307 K/UL (ref 135–450)
PMV BLD AUTO: 8.6 FL (ref 5–10.5)
POTASSIUM REFLEX MAGNESIUM: 4.5 MMOL/L (ref 3.5–5.1)
PRO-BNP: 4196 PG/ML (ref 0–449)
PROTEIN UA: NEGATIVE MG/DL
PROTHROMBIN TIME: 13.5 SEC (ref 9.9–12.7)
RBC # BLD: 5.13 M/UL (ref 4–5.2)
RBC UA: 1 /HPF (ref 0–4)
SODIUM BLD-SCNC: 142 MMOL/L (ref 136–145)
SPECIFIC GRAVITY UA: 1.02 (ref 1–1.03)
TOTAL PROTEIN: 6.6 G/DL (ref 6.4–8.2)
TROPONIN: <0.01 NG/ML
URINE REFLEX TO CULTURE: ABNORMAL
URINE TYPE: ABNORMAL
UROBILINOGEN, URINE: 0.2 E.U./DL
WBC # BLD: 7.8 K/UL (ref 4–11)
WBC UA: 3 /HPF (ref 0–5)

## 2022-01-22 PROCEDURE — 83880 ASSAY OF NATRIURETIC PEPTIDE: CPT

## 2022-01-22 PROCEDURE — 85610 PROTHROMBIN TIME: CPT

## 2022-01-22 PROCEDURE — 71045 X-RAY EXAM CHEST 1 VIEW: CPT

## 2022-01-22 PROCEDURE — 2060000000 HC ICU INTERMEDIATE R&B

## 2022-01-22 PROCEDURE — 99283 EMERGENCY DEPT VISIT LOW MDM: CPT

## 2022-01-22 PROCEDURE — 85025 COMPLETE CBC W/AUTO DIFF WBC: CPT

## 2022-01-22 PROCEDURE — 93005 ELECTROCARDIOGRAM TRACING: CPT | Performed by: PHYSICIAN ASSISTANT

## 2022-01-22 PROCEDURE — 6370000000 HC RX 637 (ALT 250 FOR IP): Performed by: PHYSICIAN ASSISTANT

## 2022-01-22 PROCEDURE — 81001 URINALYSIS AUTO W/SCOPE: CPT

## 2022-01-22 PROCEDURE — 2580000003 HC RX 258: Performed by: PHYSICIAN ASSISTANT

## 2022-01-22 PROCEDURE — 36415 COLL VENOUS BLD VENIPUNCTURE: CPT

## 2022-01-22 PROCEDURE — 6370000000 HC RX 637 (ALT 250 FOR IP): Performed by: FAMILY MEDICINE

## 2022-01-22 PROCEDURE — 85730 THROMBOPLASTIN TIME PARTIAL: CPT

## 2022-01-22 PROCEDURE — 70496 CT ANGIOGRAPHY HEAD: CPT

## 2022-01-22 PROCEDURE — 84484 ASSAY OF TROPONIN QUANT: CPT

## 2022-01-22 PROCEDURE — 80053 COMPREHEN METABOLIC PANEL: CPT

## 2022-01-22 PROCEDURE — 6360000004 HC RX CONTRAST MEDICATION: Performed by: PHYSICIAN ASSISTANT

## 2022-01-22 PROCEDURE — 70450 CT HEAD/BRAIN W/O DYE: CPT

## 2022-01-22 RX ORDER — ASPIRIN 81 MG/1
324 TABLET, CHEWABLE ORAL ONCE
Status: DISCONTINUED | OUTPATIENT
Start: 2022-01-22 | End: 2022-01-24 | Stop reason: HOSPADM

## 2022-01-22 RX ORDER — NITROGLYCERIN 400 UG/1
1 SPRAY ORAL EVERY 5 MIN PRN
Status: DISCONTINUED | OUTPATIENT
Start: 2022-01-22 | End: 2022-01-24 | Stop reason: HOSPADM

## 2022-01-22 RX ORDER — POLYETHYLENE GLYCOL 3350 17 G/17G
17 POWDER, FOR SOLUTION ORAL DAILY PRN
Status: DISCONTINUED | OUTPATIENT
Start: 2022-01-22 | End: 2022-01-24 | Stop reason: HOSPADM

## 2022-01-22 RX ORDER — ONDANSETRON 4 MG/1
4 TABLET, ORALLY DISINTEGRATING ORAL EVERY 8 HOURS PRN
Status: DISCONTINUED | OUTPATIENT
Start: 2022-01-22 | End: 2022-01-24 | Stop reason: HOSPADM

## 2022-01-22 RX ORDER — ASPIRIN 81 MG/1
81 TABLET ORAL DAILY
Status: DISCONTINUED | OUTPATIENT
Start: 2022-01-23 | End: 2022-01-22

## 2022-01-22 RX ORDER — ONDANSETRON 2 MG/ML
4 INJECTION INTRAMUSCULAR; INTRAVENOUS EVERY 6 HOURS PRN
Status: DISCONTINUED | OUTPATIENT
Start: 2022-01-22 | End: 2022-01-24 | Stop reason: HOSPADM

## 2022-01-22 RX ORDER — LEVOTHYROXINE SODIUM 112 UG/1
112 TABLET ORAL DAILY
Status: DISCONTINUED | OUTPATIENT
Start: 2022-01-23 | End: 2022-01-24 | Stop reason: HOSPADM

## 2022-01-22 RX ORDER — SODIUM CHLORIDE 9 MG/ML
INJECTION, SOLUTION INTRAVENOUS CONTINUOUS
Status: ACTIVE | OUTPATIENT
Start: 2022-01-22 | End: 2022-01-22

## 2022-01-22 RX ADMIN — IOPAMIDOL 75 ML: 755 INJECTION, SOLUTION INTRAVENOUS at 15:26

## 2022-01-22 RX ADMIN — APIXABAN 2.5 MG: 5 TABLET, FILM COATED ORAL at 23:11

## 2022-01-22 RX ADMIN — SODIUM CHLORIDE: 9 INJECTION, SOLUTION INTRAVENOUS at 16:27

## 2022-01-22 ASSESSMENT — ENCOUNTER SYMPTOMS
PHOTOPHOBIA: 0
COLOR CHANGE: 0
NAUSEA: 0
ABDOMINAL PAIN: 0
VOMITING: 0
DIARRHEA: 0
RESPIRATORY NEGATIVE: 1
BACK PAIN: 0
COUGH: 0
CHEST TIGHTNESS: 0
CONSTIPATION: 0
SHORTNESS OF BREATH: 0

## 2022-01-22 ASSESSMENT — PAIN DESCRIPTION - PAIN TYPE: TYPE: ACUTE PAIN

## 2022-01-22 ASSESSMENT — PAIN DESCRIPTION - LOCATION: LOCATION: BACK

## 2022-01-22 ASSESSMENT — PAIN SCALES - GENERAL: PAINLEVEL_OUTOF10: 3

## 2022-01-22 NOTE — ED NOTES
Pt refused ASA, states she was told not to take it with other blood thinning medication.       Keyla Minaya RN  46/06/25 2056

## 2022-01-22 NOTE — PROGRESS NOTES
Daughter called on call. Dizziness started two weeks ago. Constant. Feels like head spinning. Worse if close eyes. Worse if look down. \"swimming feeling. \"  Chronic numbness in feet. She denies that it started when rolling over in bed. She states that it also worsens with position change. She has been using her walker because she feels unsteady. +Slight headache. Trouble hearing. Previously had dizziness that was diagnosed as vertigo. Took  meclizine with no improvement. She states \"this is not vertigo. \" She thinks that it is from her apixiban because the package insert says it can cause dizziness. No speech difficulty, facial asymmetry, or weakness on one side. She recently was diagnosed with a thrombus on her watchman device. I recommended that she proceed to the ER to be evaluated for posterior circulation stroke or intracranial bleed.

## 2022-01-22 NOTE — ED NOTES
Pt concerned about CT with contrast due to kidney function. Requesting that Dr. Katie Marinelli be notified or consulted if patient is admitted.         Francheska Albert RN  [id number]

## 2022-01-22 NOTE — H&P
HOSPITALISTS HISTORY AND PHYSICAL    1/22/2022 6:02 PM    Patient Information:  Jose Valencia is a 80 y.o. female [id number]  PCP:  Gavino Stuart MD (Tel: [phone] )    Chief complaint:    Chief Complaint   Patient presents with    Dizziness     x 2 wks after starting eliquis. History of Present Illness:  Lio Hansen is a 80 y.o. female with hh/o CVA, CHF, COPD , HTN, cardiomyopathy, neuropathy, AFib , pacemaker, chronic anticoagulation was sent by the cardiologist for evaluation of dizziness. The pt c/o dizziness, Lower extremity numbness, unilateral hearing loss and vertigo. Also has unsteady gait. The pt underwent DONA by Dr Walt Ortiz on 01.12/21 that showed thrombus on the watchman device. The pt was started on Eliquis. The pt correlate the symptoms since then . She followed up with cardiology Dr. Celio Fraga today who sent her to the ED for further management  The pt denies focal muscle weakness. No facial droop, no change in speech or difficulty swallowing. Denies vision change or headache. CT head showed old frontal infarct . CTA head and neck showed occlusion involving the PCA . The pt does not think her Pacemaker is compatible with MRI. History obtained from patient and . Old medical record       REVIEW OF SYSTEMS:   Constitutional: Negative for fever,chills or night sweats  ENT: Negative for rhinorrhea, epistaxis, hoarseness, sore throat. Respiratory: Negative for shortness of breath,wheezing  Cardiovascular: Negative for chest pain, palpitations   Gastrointestinal: Negative for nausea, vomiting, diarrhea  Genitourinary: Negative for polyuria, dysuria   Hematologic/Lymphatic: Negative for bleeding tendency, easy bruising  Musculoskeletal: Negative for myalgias and arthralgias  Neurologic: Negative for confusion,dysarthria.   Skin: Negative for itching,rash  Psychiatric: Negative for depression,anxiety, agitation. Endocrine: Negative for polydipsia,polyuria,heat /cold intolerance. Past Medical History:   has a past medical history of Allergic rhinitis, cause unspecified, Arthritis, Atrial fibrillation ([address] Bronchopneumonia, CAD (coronary artery disease), Cerebral artery occlusion with cerebral infarction ([facility] [address] Chronic gouty arthropathy, Chronic kidney disease, Congestive heart failure, unspecified, Degeneration of cervical intervertebral disc, Essential and other specified forms of tremor, Gout, HIGH CHOLESTEROL, History of CVA (cerebrovascular accident), Hx of blood clots, Hypertension, Influenza, Mitral valve stenosis and aortic valve insufficiency, Movement disorder, Pacemaker, Peptic ulcer, unspecified site, unspecified as acute or chronic, without mention of hemorrhage, perforation, or obstruction, Thyroid disease, Unspecified disorder of kidney and ureter, and Unspecified hypothyroidism. Past Surgical History:   has a past surgical history that includes back surgery; Cholecystectomy; Cardiac surgery; Coronary artery bypass graft (1987); shoulder surgery; Cardiac catheterization (7/2012); hip surgery (Left, 03/16/2017); joint replacement; Cardiac catheterization (08/07/2018); Percutaneous Transluminal Coronary Angio (11/04/2014); pr njx aa&/strd tfrml epi lumbar/sacral 1 level (Right, 12/3/2018); Femoral-femoral Bypass Graft (N/A, 8/22/2019); femoral bypass (Left, 8/22/2019); and IR KYPHOPLASTY LUMBAR 1 VERTEBRAL BODY (5/14/2021). Medications:  No current facility-administered medications on file prior to encounter.      Current Outpatient Medications on File Prior to Encounter   Medication Sig Dispense Refill    apixaban (ELIQUIS) 2.5 MG TABS tablet Take 1 tablet by mouth 2 times daily 180 tablet 1    levothyroxine (SYNTHROID) 112 MCG tablet TAKE 1 TABLET EVERY DAY (NEED MD APPOINTMENT FOR REFILLS) 90 tablet 0    metoprolol succinate (TOPROL XL) 25 MG extended release tablet Take 0.5 tablets by mouth daily . 5 tablet daily 45 tablet 0    topiramate (TOPAMAX) 50 MG tablet TAKE 2 TABLETS TWICE DAILY 360 tablet 1    vitamin D (ERGOCALCIFEROL) 1.25 MG (36254 UT) CAPS capsule Take 1 capsule by mouth once a week 12 capsule 1    acetaminophen (TYLENOL) 325 MG tablet Take 1,000 mg by mouth daily       betamethasone valerate (VALISONE) 0.1 % cream Apply topically 2 times daily. 30 g 5    allopurinol (ZYLOPRIM) 100 MG tablet TAKE 1 TABLET EVERY DAY (ALONG WITH 300MG TABLET) 90 tablet 1    torsemide (DEMADEX) 10 MG tablet TAKE 1 TABLET EVERY OTHER DAY ALTERNATING WITH  2  TABLETS EVERY OTHER DAY (Patient taking differently: Take 20 mg by mouth daily TAKE 1 TABLET EVERY OTHER DAY ALTERNATING WITH  2  TABLETS EVERY OTHER DAY    PATIENT TAKING 20 MG DAILY FOR 5 DAYS THEN RETURN TO ALTERNATE) 135 tablet 1    aspirin EC 81 MG EC tablet Take 1 tablet by mouth daily 30 tablet 5    nitroGLYCERIN (NITROLINGUAL) 0.4 MG/SPRAY 0.4 mg spray Place 1 spray under the tongue every 5 minutes as needed for Chest pain 1 Bottle 3     Current Facility-Administered Medications   Medication Dose Route Frequency Provider Last Rate Last Admin    0.9 % sodium chloride infusion   IntraVENous Continuous MAYNOR Sims 75 mL/hr at 01/22/22 1627 New Bag at 01/22/22 1627    aspirin chewable tablet 324 mg  324 mg Oral Once MAYNOR Sims         Current Outpatient Medications   Medication Sig Dispense Refill    apixaban (ELIQUIS) 2.5 MG TABS tablet Take 1 tablet by mouth 2 times daily 180 tablet 1    levothyroxine (SYNTHROID) 112 MCG tablet TAKE 1 TABLET EVERY DAY (NEED MD APPOINTMENT FOR REFILLS) 90 tablet 0    metoprolol succinate (TOPROL XL) 25 MG extended release tablet Take 0.5 tablets by mouth daily . 5 tablet daily 45 tablet 0    topiramate (TOPAMAX) 50 MG tablet TAKE 2 TABLETS TWICE DAILY 360 tablet 1    vitamin D (ERGOCALCIFEROL) 1.25 MG (15632 UT) CAPS capsule Take 1 capsule by mouth once a week 12 capsule 1    acetaminophen (TYLENOL) 325 MG tablet Take 1,000 mg by mouth daily       betamethasone valerate (VALISONE) 0.1 % cream Apply topically 2 times daily. 30 g 5    allopurinol (ZYLOPRIM) 100 MG tablet TAKE 1 TABLET EVERY DAY (ALONG WITH 300MG TABLET) 90 tablet 1    torsemide (DEMADEX) 10 MG tablet TAKE 1 TABLET EVERY OTHER DAY ALTERNATING WITH  2  TABLETS EVERY OTHER DAY (Patient taking differently: Take 20 mg by mouth daily TAKE 1 TABLET EVERY OTHER DAY ALTERNATING WITH  2  TABLETS EVERY OTHER DAY    PATIENT TAKING 20 MG DAILY FOR 5 DAYS THEN RETURN TO ALTERNATE) 135 tablet 1    aspirin EC 81 MG EC tablet Take 1 tablet by mouth daily 30 tablet 5    nitroGLYCERIN (NITROLINGUAL) 0.4 MG/SPRAY 0.4 mg spray Place 1 spray under the tongue every 5 minutes as needed for Chest pain 1 Bottle 3       Allergies: Allergies   Allergen Reactions    Aspirin Nausea Only    Diltiazem Anaphylaxis    Diltiazem Hcl Anaphylaxis    Lorazepam Other (See Comments)     hallucinations  hallucinations  hallucinations    Sulfa Antibiotics Rash and Hives    Atorvastatin Other (See Comments)     Muscle pains  Muscle pains  Muscle pains    Dabigatran Nausea Only     And indigestion  And indigestion    Aka Pradaxa  And indigestion  And indigestion  And indigestion    Aka Pradaxa  And indigestion  And indigestion  And indigestion    Aka Pradaxa    Mysoline [Primidone]     Nsaids      Other reaction(s): Unknown    Other Other (See Comments)     Nitroglycerin patches causes severe headaches    Primidone      Other reaction(s): Unknown        Social History:  Patient Lives    reports that she quit smoking about 35 years ago. Her smoking use included cigarettes. She has a 28.00 pack-year smoking history. She has never used smokeless tobacco. She reports previous alcohol use. She reports that she does not use drugs.      Family History:  family history includes Cancer in her maternal grandmother, paternal grandmother, and sister; Diabetes in her brother and maternal uncle; Heart Disease in her brother, maternal aunt, and sister; Hypertension in her brother and paternal aunt; Stroke in her maternal aunt. ,     Physical Exam:  BP 98/68   Pulse 71   Temp 97.9 °F (36.6 °C) (Oral)   Resp 26   Ht 5' 3\" (1.6 m)   Wt 124 lb 14.4 oz (56.7 kg)   SpO2 95%   BMI 22.13 kg/m²     General appearance:  Appears comfortable. Well nourished  Eyes: Sclera clear, pupils equal  ENT: Moist mucus membranes, no thrush. Trachea midline. Cardiovascular: Regular rhythm, normal S1, S2. No murmur, gallop, rub. No edema in lower extremities  Respiratory: Clear to auscultation bilaterally, no wheeze, good inspiratory effort  Gastrointestinal: Abdomen soft, non-tender, not distended, normal bowel sounds  Musculoskeletal: No cyanosis in digits, neck supple  Neurology: Cranial nerves grossly intact. Alert and oriented in time, place and person. No speech or motor deficits  Psychiatry: Appropriate affect. Not agitated  Skin: Warm, dry, normal turgor, no rash  Brisk capillary refill, peripheral pulses palpable   Labs:  CBC:   Lab Results   Component Value Date    WBC 7.8 01/22/2022    RBC 5.13 01/22/2022    HGB 13.4 01/22/2022    HCT 42.8 01/22/2022    MCV 83.4 01/22/2022    MCH 26.2 01/22/2022    MCHC 31.4 01/22/2022    RDW 21.8 01/22/2022     01/22/2022    MPV 8.6 01/22/2022     BMP:    Lab Results   Component Value Date     01/22/2022    K 4.5 01/22/2022     01/22/2022    CO2 23 01/22/2022    BUN 32 01/22/2022    CREATININE 1.3 01/22/2022    CALCIUM 9.3 01/22/2022    GFRAA 48 01/22/2022    GFRAA 38 04/02/2013    LABGLOM 39 01/22/2022    GLUCOSE 115 01/22/2022    GLUCOSE 85 10/20/2021     CTA HEAD NECK W CONTRAST   Final Result   No large vessel occlusion visualized within the head or neck. Severe stenosis of the proximal left PCA. Mild-to-moderate stenoses at the origins of the vertebral arteries. Incidentally noted pulmonary emphysema. RECOMMENDATIONS:   Unavailable         CT HEAD WO CONTRAST   Final Result   No acute intracranial abnormality. Stable remote infarct in the right frontal lobe. Chronic small vessel white   matter ischemic changes. XR CHEST PORTABLE   Final Result   1. Improved with mild residual congestive heart failure. Chest Xray:   EKG:    I visualized CXR images and EKG strips    Discussed case  with     Problem List  Active Problems:    Dizziness  Resolved Problems:    * No resolved hospital problems. *        Assessment/Plan:   Dizziness and unilateral hearing impairment transietn  admit to r/out CVA/ TIA  CT head showed stable remote infarct in the frontal lobe. And chronic small vessel changes  CT A head showed severe stenosis of Proximal left PCA   No large vessel occlusion   Pt does not want to get MRI thinks her pacemaker is not compatible  WIll get neurology eval  Allergies to Statin  Cont ASA , and ELiquis    Hypotension BP is 98/68  Received fluid in the ED   Holding lasix and metoprolol        DVT prophylaxis Eliquis  Code status Full   Diet SLp eval  IV access   Zavala Catheter    Admit as inpatient. I anticipate hospitalization spanning more than two midnights for investigation and treatment of the above medically necessary diagnoses. Please note that some part of this chart was generated using Dragon dictation software. Although every effort was made to ensure the accuracy of this automated transcription, some errors in transcription may have occurred inadvertently. If you may need any clarification, please do not hesitate to contact me through [facility].        Phyllis Root MD    1/22/2022 6:02 PM

## 2022-01-22 NOTE — ED PROVIDER NOTES
[facility]        Pt Name: Michaela Aleman  MRN: [id number]  [place] 1940  Date of evaluation: 1/22/2022  Provider: MAYNOR Greer  PCP: Fredy Crane MD  Note Started: 2:12 PM EST        I have seen and evaluated this patient with my supervising physician Ethan Becker, *. CHIEF COMPLAINT       Chief Complaint   Patient presents with    Dizziness     x 2 wks after starting eliquis. HISTORY OF PRESENT ILLNESS   (Location, Timing/Onset, Context/Setting, Quality, Duration, Modifying Factors, Severity, Associated Signs and Symptoms)  Note limiting factors. Chief Complaint: Dizziness     Michaela Aleman is a 80 y.o. female with past medical history of atrial fibrillation, CAD, previous TIA, chronic kidney disease, CHF, hyperlipidemia, hypertension, peptic ulcer disease and thyroid disease who presents to the ED with complaint of dizziness. Patient states she has history of atrial fibrillation and was on Plavix. Patient states she had a DONA done 2 weeks ago. States she was told that time that she had a blood clot on DONA. Patient states she was switched from Plavix to Eliquis at that time due to blood clot. Patient states ever since switching to Eliquis she has had feelings of dizziness. She states dizziness has been constant for the past 2 weeks but wax and wanes in intensity. Patient states dizziness is worsened when she sits up or changes positions. Patient states when she stands up she feels like she is off balance and going to fall over. She denies any falls or injuries. She states today she had worsening dizziness with a little bit of a headache. Became concerned and came to the ED for further evaluation and treatment. She denies worst headache of life or thunderclap onset. Denies any visual changes, speech disturbances or numbness/tingling.   Denies chest pain, shortness of breath, abdominal pain, nausea/vomiting, urinary symptoms or changes in bowel movements. She states she has had a history of vertigo in the past and states current symptoms do not feel similar at this time. Nursing Notes were all reviewed and agreed with or any disagreements were addressed in the HPI. REVIEW OF SYSTEMS    (2-9 systems for level 4, 10 or more for level 5)     Review of Systems   Constitutional: Negative for activity change, appetite change, chills, diaphoresis, fatigue and fever. Eyes: Negative for photophobia and visual disturbance. Respiratory: Negative. Negative for cough, chest tightness and shortness of breath. Cardiovascular: Negative. Negative for chest pain, palpitations and leg swelling. Gastrointestinal: Negative for abdominal pain, constipation, diarrhea, nausea and vomiting. Genitourinary: Negative for decreased urine volume, difficulty urinating, dysuria, flank pain, frequency, hematuria and urgency. Musculoskeletal: Negative for arthralgias, back pain, myalgias, neck pain and neck stiffness. Skin: Negative for color change, pallor, rash and wound. Neurological: Positive for dizziness and headaches. Negative for tremors, seizures, syncope, facial asymmetry, speech difficulty, weakness, light-headedness and numbness. Positives and Pertinent negatives as per HPI. Except as noted above in the ROS, all other systems were reviewed and negative.        PAST MEDICAL HISTORY     Past Medical History:   Diagnosis Date    Allergic rhinitis, cause unspecified     Arthritis     Atrial fibrillation ([address]     Bronchopneumonia     CAD (coronary artery disease)     stent:  post cataract surgery (CABG)    Cerebral artery occlusion with cerebral infarction ([address]     TIA    Chronic gouty arthropathy     Chronic kidney disease     Congestive heart failure, unspecified     Degeneration of cervical intervertebral disc     Essential and other specified forms of tremor     Gout     HIGH CHOLESTEROL     History of CVA (cerebrovascular accident)     Hx of blood clots     Hypertension     Influenza 12/23/2017    Mitral valve stenosis and aortic valve insufficiency     Movement disorder     back problems    Pacemaker     Peptic ulcer, unspecified site, unspecified as acute or chronic, without mention of hemorrhage, perforation, or obstruction     Thyroid disease     Unspecified disorder of kidney and ureter     Unspecified hypothyroidism          SURGICAL HISTORY     Past Surgical History:   Procedure Laterality Date    BACK SURGERY      CARDIAC CATHETERIZATION  7/2012    CARDIAC CATHETERIZATION  08/07/2018    Unsuccesful  of RCA    CARDIAC SURGERY      CABG & Cardiac ablation    CHOLECYSTECTOMY      CORONARY ARTERY BYPASS GRAFT  1987    LIMA- Diag/LAD, SVG- RCA    FEMORAL BYPASS Left 8/22/2019    LEFT FEMORAL TO POPLITEAL BYPASS GRAFT performed by Shelby Ragsdale MD at [facility] [address] FEMORAL-FEMORAL BYPASS GRAFT N/A 8/22/2019    FEMORAL TO FEMORAL BYPASS performed by Shelby Ragsdale MD at [address] [address] Left 03/16/2017    Left hip pinning    IR KYPHOPLASTY LUMBAR FIRST LEVEL  5/14/2021    IR KYPHOPLASTY LUMBAR FIRST LEVEL 5/14/2021 [facility] SPECIAL PROCEDURES    JOINT REPLACEMENT      MD NJX AA&/STRD TFRML EPI LUMBAR/SACRAL 1 LEVEL Right 12/3/2018    RIGHT L3 AND L4 LUMBAR TRANSFORAMINAL EPIRUAL STEROID INJECTION WITH FLUOROSCOPY performed by Rian Foreman MD at [facility]  11/04/2014    MARLENY - 3.0 x 28 to the Ist Diag    SHOULDER SURGERY      left         CURRENTMEDICATIONS       Previous Medications    ACETAMINOPHEN (TYLENOL) 325 MG TABLET    Take 1,000 mg by mouth daily     ALLOPURINOL (ZYLOPRIM) 100 MG TABLET    TAKE 1 TABLET EVERY DAY (ALONG WITH 300MG TABLET)    APIXABAN (ELIQUIS) 2.5 MG TABS TABLET    Take 1 tablet by mouth 2 times daily    ASPIRIN EC 81 MG EC TABLET    Take 1 tablet by mouth daily    BETAMETHASONE VALERATE (Imn Abbot) 0.1 % CREAM    Apply topically 2 times daily. LEVOTHYROXINE (SYNTHROID) 112 MCG TABLET    TAKE 1 TABLET EVERY DAY (NEED MD APPOINTMENT FOR REFILLS)    METOPROLOL SUCCINATE (TOPROL XL) 25 MG EXTENDED RELEASE TABLET    Take 0.5 tablets by mouth daily . 5 tablet daily    NITROGLYCERIN (NITROLINGUAL) 0.4 MG/SPRAY 0.4 MG SPRAY    Place 1 spray under the tongue every 5 minutes as needed for Chest pain    TOPIRAMATE (TOPAMAX) 50 MG TABLET    TAKE 2 TABLETS TWICE DAILY    TORSEMIDE (DEMADEX) 10 MG TABLET    TAKE 1 TABLET EVERY OTHER DAY ALTERNATING WITH  2  TABLETS EVERY OTHER DAY    VITAMIN D (ERGOCALCIFEROL) 1.25 MG (84371 UT) CAPS CAPSULE    Take 1 capsule by mouth once a week         ALLERGIES     Aspirin, Diltiazem, Diltiazem hcl, Lorazepam, Sulfa antibiotics, Atorvastatin, Dabigatran, Mysoline [primidone], Nsaids, Other, and Primidone    FAMILYHISTORY       Family History   Problem Relation Age of Onset    Cancer Sister     Heart Disease Sister     Diabetes Brother     Hypertension Brother     Heart Disease Brother     Stroke Maternal Aunt     Heart Disease Maternal Aunt     Diabetes Maternal Uncle     Hypertension Paternal Aunt     Cancer Maternal Grandmother     Cancer Paternal Grandmother     Rheum Arthritis Neg Hx     Lupus Neg Hx           SOCIAL HISTORY       Social History     Tobacco Use    Smoking status: Former Smoker     Packs/day: 1.00     Years: 28.00     Pack years: 28.00     Types: Cigarettes     Quit date: 1987     Years since quittin.0    Smokeless tobacco: Never Used    Tobacco comment: H.O.smoking at age 15 / smoked up to 1 p.p.d / quit    Vaping Use    Vaping Use: Never used   Substance Use Topics    Alcohol use: Not Currently     Alcohol/week: 0.0 standard drinks    Drug use: No       SCREENINGS             PHYSICAL EXAM    (up to 7 for level 4, 8 or more for level 5)     ED Triage Vitals [22 1340]   BP Temp Temp Source Pulse Resp SpO2 Height Weight   (!) 151/77 97.9 °F (36.6 °C) Oral 75 16 96 % 5' 3\" (1.6 m) 124 lb 14.4 oz (56.7 kg)       Physical Exam  Constitutional:       General: She is not in acute distress. Appearance: Normal appearance. She is well-developed. She is not ill-appearing, toxic-appearing or diaphoretic. HENT:      Head: Normocephalic and atraumatic. Comments: Atraumatic. No raccoon eyes or plummer sign     Right Ear: Tympanic membrane, ear canal and external ear normal. There is no impacted cerumen. Left Ear: Tympanic membrane, ear canal and external ear normal. There is no impacted cerumen. Nose: Nose normal. No congestion or rhinorrhea. Mouth/Throat:      Mouth: Mucous membranes are moist.      Pharynx: No oropharyngeal exudate or posterior oropharyngeal erythema. Eyes:      General:         Right eye: No discharge. Left eye: No discharge. Extraocular Movements: Extraocular movements intact. Conjunctiva/sclera: Conjunctivae normal.      Pupils: Pupils are equal, round, and reactive to light. Neck:      Vascular: No carotid bruit. Cardiovascular:      Rate and Rhythm: Normal rate and regular rhythm. Pulses: Normal pulses. Heart sounds: Normal heart sounds. No murmur heard. No friction rub. No gallop. Comments: 2+ radial pulses bilaterally. No pedal edema. No calf tenderness. No JVD. Pulmonary:      Effort: Pulmonary effort is normal. No respiratory distress. Breath sounds: Normal breath sounds. No stridor. No wheezing, rhonchi or rales. Chest:      Chest wall: No tenderness. Abdominal:      General: Abdomen is flat. Bowel sounds are normal. There is no distension. Palpations: Abdomen is soft. There is no mass. Tenderness: There is no abdominal tenderness. There is no right CVA tenderness, left CVA tenderness, guarding or rebound. Hernia: No hernia is present. Musculoskeletal:         General: Normal range of motion.       Cervical back: Normal range of motion and neck supple. No rigidity or tenderness. Lymphadenopathy:      Cervical: No cervical adenopathy. Skin:     General: Skin is warm and dry. Coloration: Skin is not pale. Findings: No erythema or rash. Neurological:      General: No focal deficit present. Mental Status: She is alert and oriented to person, place, and time. GCS: GCS eye subscore is 4. GCS verbal subscore is 5. GCS motor subscore is 6. Cranial Nerves: Cranial nerves are intact. No cranial nerve deficit, dysarthria or facial asymmetry. Sensory: Sensation is intact. No sensory deficit. Motor: Tremor present. No weakness. Coordination: Coordination is intact. Coordination normal. Finger-Nose-Finger Test and Heel to [facility] Test normal.      Gait: Gait is intact. Gait normal.      Comments: Alert and oriented x3. GCS 15. Cranial nerves II through XII intact. Speech is clear. There is no facial droop. EOMs intact. PERRLA. No nystagmus. Negative test of skew. Sensation intact to the upper and lower extremities throughout. There is full range of motion and strength of the upper and lower extremities throughout. No focal deficit or weakness. Normal heel-to-shin. Finger-to-nose bilaterally with faint tremor which he states is normal bilaterally. Patient able to ambulate unassisted with slight limp secondary to back pain and some left hip pain. There is no ataxia.    Psychiatric:         Behavior: Behavior normal.         DIAGNOSTIC RESULTS   LABS:    Labs Reviewed   CBC WITH AUTO DIFFERENTIAL - Abnormal; Notable for the following components:       Result Value    RDW 21.8 (*)     All other components within normal limits    Narrative:     Performed at:  [facility] [address], [place], [address]   Phone [phone]   COMPREHENSIVE METABOLIC PANEL W/ REFLEX TO MG FOR LOW K - Abnormal; Notable for the following components:    Glucose 115 (*)     BUN 32 (*)     CREATININE 1.3 (*)     GFR Non- 39 (*)     GFR  48 (*)     ALT 7 (*)     AST 13 (*)     All other components within normal limits    Narrative:     Performed at:  [facility]  [place]   Phone [phone]   BRAIN NATRIURETIC PEPTIDE - Abnormal; Notable for the following components:    Pro-BNP 4,196 (*)     All other components within normal limits    Narrative:     Performed at:  [facility]  [place]   Phone [phone]   PROTIME-INR - Abnormal; Notable for the following components:    Protime 13.5 (*)     INR 1.19 (*)     All other components within normal limits    Narrative:     Performed at:  [facility]  [place]   Phone [phone]   APTT - Abnormal; Notable for the following components:    aPTT 42.5 (*)     All other components within normal limits    Narrative:     Performed at:  [facility]  [place]   Phone [phone]   TROPONIN    Narrative:     Performed at:  [facility]  [place]   Phone [phone]   URINE RT REFLEX TO CULTURE       When ordered only abnormal lab results are displayed. All other labs were within normal range or not returned as of this dictation. EKG: When ordered, EKG's are interpreted by the Emergency Department Physician in the absence of a cardiologist.  Please see their note for interpretation of EKG.     RADIOLOGY:   Non-plain film images such as CT, Ultrasound and MRI are read by the radiologist. Plain radiographic images are visualized and preliminarily interpreted by the ED Provider with the below findings:        Interpretation per the Radiologist below, if available at the time of this note:     [facility] Final Result   No large vessel occlusion visualized within the head or neck. Severe stenosis of the proximal left PCA. Mild-to-moderate stenoses at the origins of the vertebral arteries. Incidentally noted pulmonary emphysema. RECOMMENDATIONS:   Unavailable         CT HEAD WO CONTRAST   Final Result   No acute intracranial abnormality. Stable remote infarct in the right frontal lobe. Chronic small vessel white   matter ischemic changes. XR CHEST PORTABLE   Final Result   1. Improved with mild residual congestive heart failure. No results found. PROCEDURES   Unless otherwise noted below, none     Procedures    CRITICAL CARE TIME       CONSULTS:  None      EMERGENCY DEPARTMENT COURSE and DIFFERENTIAL DIAGNOSIS/MDM:   Vitals:    Vitals:    01/22/22 1500 01/22/22 1550 01/22/22 1629 01/22/22 1659   BP: (!) 127/48 (!) 157/64 138/60 98/68   Pulse: 70  70 71   Resp: 17  17 26   Temp:       TempSrc:       SpO2: 97%  95%    Weight:       Height:           Patient was given the following medications:  Medications   0.9 % sodium chloride infusion ( IntraVENous New Bag 1/22/22 1627)   aspirin chewable tablet 324 mg (0 mg Oral Held 1/22/22 1708)   iopamidol (ISOVUE-370) 76 % injection 75 mL (75 mLs IntraVENous Given 1/22/22 1526)           Patient is an 80-year-old female who presents the ED with complaint of dizziness. Has had dizziness for the past 2 weeks. Apparently she was told she had a clot in her heart and just recently started on Eliquis. She states she has had dizziness ever since then. Patient states has had vertigo in the past and states current symptoms do not feel similar. She has no ataxia with ambulation here in the ED. On finger-to-nose she has some slight tremor bilaterally. Otherwise has reassuring neurologic examination. IV was established and blood work obtained.   Given patient's history of recent clot with anticoagulation on Eliquis and now persistent dizziness that she states does not feel similar to previous vertigo episodes concern for potential posterior circulation syndrome versus CVA. Symptoms been ongoing for the past 2 weeks and therefore stroke alert was not initiated. Did order CT and CTA of the head. CBC showed normal white count, hemoglobin and platelets. CMP showed creatinine of 1.3 with GFR of 39. She does have a history of chronic kidney disease but kidney function actually appears improved from previously documented results. Given patient's history of recent clot just recently placed on Eliquis do believe CTA indicated at this time given her complaints of dizziness. CTA was ordered. Was going to give patient fluids by patient has history of CHF with chest x-ray that shows some mild residual congestive heart failure. BNP elevated at 4000. EKG interpreted by attending. CT of the head shows stable remote infarcts but no acute abnormality. CTA showed no evidence of LVO. Coags obtained. Troponin was normal.  Patient complaining of dizziness and given her history believe she would benefit from admission for further evaluation and treatment. Patient's daughter was concerned given her history of chronic kidney disease and the fact that she received IV contrast here in the ED and apparently got in contact with PCP who then got in contact with nephrology. Nephrologist, Dr. Katlyn Browne, called the emergency department and we discussed patient's work-up thus far. He requested patient be given 75 cc of normal saline an hour. Given his request the believe patient would benefit from admission for evaluation of dizziness but also continued monitoring of kidney function given the fact that she received IV contrast here in the emergency department.   Low suspicion for large vessel occlusion, intracranial hemorrhage, intracranial mass, subarachnoid hemorrhage, subdural hematoma, meningitis, encephalitis, AAA, dissection or other emergent etiology at this time. FINAL IMPRESSION      1. Dizziness          DISPOSITION/PLAN   DISPOSITION Admitted 01/22/2022 05:23:28 PM      PATIENT REFERRED TO:  No follow-up provider specified.     DISCHARGE MEDICATIONS:  New Prescriptions    No medications on file       DISCONTINUED MEDICATIONS:  Discontinued Medications    No medications on file              (Please note that portions of this note were completed with a voice recognition program.  Efforts were made to edit the dictations but occasionally words are mis-transcribed.)    MAYNOR Tapia (electronically signed)          MAYNOR Evans  01/22/22 5113

## 2022-01-22 NOTE — ED PROVIDER NOTES
[facility] Emergency Department      Pt Name: Gavino Rodriguez  MRN: [id number]  [place] 1940  Date of evaluation: 1/22/2022  Provider: Annette Smith MD  I independently performed a history and physical on Gavino Rodriguez. All diagnostic, treatment, and disposition decisions were made by myself in conjunction with the advanced practice provider. HPI: Gavino Rodriguez presented with   Chief Complaint   Patient presents with    Dizziness     x 2 wks after starting eliquis. Gavino Rodriguez has a past medical history of Allergic rhinitis, cause unspecified, Arthritis, Atrial fibrillation ([facility] [address] Bronchopneumonia, CAD (coronary artery disease), Cerebral artery occlusion with cerebral infarction [facility]), Chronic gouty arthropathy, Chronic kidney disease, Congestive heart failure, unspecified, Degeneration of cervical intervertebral disc, Essential and other specified forms of tremor, Gout, HIGH CHOLESTEROL, History of CVA (cerebrovascular accident), blood clots, Hypertension, Influenza (12/23/2017), Mitral valve stenosis and aortic valve insufficiency, Movement disorder, Pacemaker, Peptic ulcer, unspecified site, unspecified as acute or chronic, without mention of hemorrhage, perforation, or obstruction, Thyroid disease, Unspecified disorder of kidney and ureter, and Unspecified hypothyroidism. She has a past surgical history that includes back surgery; Cholecystectomy; Cardiac surgery; Coronary artery bypass graft (1987); shoulder surgery; Cardiac catheterization (7/2012); hip surgery (Left, 03/16/2017); joint replacement; Cardiac catheterization (08/07/2018); Percutaneous Transluminal Coronary Angio (11/04/2014); pr njx aa&/strd tfrml epi lumbar/sacral 1 level (Right, 12/3/2018); Femoral-femoral Bypass Graft (N/A, 8/22/2019); femoral bypass (Left, 8/22/2019); and IR KYPHOPLASTY LUMBAR 1 VERTEBRAL BODY (5/14/2021). No current facility-administered medications on file prior to encounter. Current Outpatient Medications on File Prior to Encounter   Medication Sig Dispense Refill    apixaban (ELIQUIS) 2.5 MG TABS tablet Take 1 tablet by mouth 2 times daily 180 tablet 1    levothyroxine (SYNTHROID) 112 MCG tablet TAKE 1 TABLET EVERY DAY (NEED MD APPOINTMENT FOR REFILLS) 90 tablet 0    metoprolol succinate (TOPROL XL) 25 MG extended release tablet Take 0.5 tablets by mouth daily . 5 tablet daily 45 tablet 0    topiramate (TOPAMAX) 50 MG tablet TAKE 2 TABLETS TWICE DAILY 360 tablet 1    vitamin D (ERGOCALCIFEROL) 1.25 MG (11734 UT) CAPS capsule Take 1 capsule by mouth once a week 12 capsule 1    acetaminophen (TYLENOL) 325 MG tablet Take 1,000 mg by mouth daily       betamethasone valerate (VALISONE) 0.1 % cream Apply topically 2 times daily.  30 g 5    allopurinol (ZYLOPRIM) 100 MG tablet TAKE 1 TABLET EVERY DAY (ALONG WITH 300MG TABLET) 90 tablet 1    torsemide (DEMADEX) 10 MG tablet TAKE 1 TABLET EVERY OTHER DAY ALTERNATING WITH  2  TABLETS EVERY OTHER DAY (Patient taking differently: Take 20 mg by mouth daily TAKE 1 TABLET EVERY OTHER DAY ALTERNATING WITH  2  TABLETS EVERY OTHER DAY    PATIENT TAKING 20 MG DAILY FOR 5 DAYS THEN RETURN TO ALTERNATE) 135 tablet 1    aspirin EC 81 MG EC tablet Take 1 tablet by mouth daily 30 tablet 5    nitroGLYCERIN (NITROLINGUAL) 0.4 MG/SPRAY 0.4 mg spray Place 1 spray under the tongue every 5 minutes as needed for Chest pain 1 Bottle 3     PHYSICAL EXAM  Vitals: BP (!) 157/64   Pulse 70   Temp 97.9 °F (36.6 °C) (Oral)   Resp 17   Ht 5' 3\" (1.6 m)   Wt 124 lb 14.4 oz (56.7 kg)   SpO2 97%   BMI 22.13 kg/m²   Constitutional:  80 y.o. female alert, cooperative  HENT:  Atraumatic scalp, mucous membranes moist  Eyes:   Conjunctiva clear, no icterus  Neck:  Supple, no visible JVD, no signs of injury  Cardiovascular:  Regular, no rubs  Thorax & Lungs:  No accessory muscle usage, clear  Abdomen:  Soft, non distended, NT  Back:  No deformity  Genitalia: Deferred  Rectal:  Deferred  Extremities:  No cyanosis, adequate perfusion  Skin:  Warm, dry  Neurologic:  Alert, speech normal, mentation normal, pupils equal, fair coordination of extremities, horizontal nystagmus on lateral gaze, no facial asymmetry   Psychiatric:  Affect appropriate    Medical Decision Making and Plan:  Briefly, this is an 80 y. o.female who presented with dizziness for two weeks. Hx of afib, started on Eliquis two weeks ago when cardiac thrombus noted. CT findings below. Plan is to admit for further care. For further details of [facility] encounter, please see documentation by advanced practice provider MAYNOR Rabago.      Labs Reviewed   CBC WITH AUTO DIFFERENTIAL - Abnormal; Notable for the following components:       Result Value    RDW 21.8 (*)     All other components within normal limits    Narrative:     Performed at:  [facility] [address] [facility], [place]   Phone [phone]   COMPREHENSIVE METABOLIC PANEL W/ REFLEX TO MG FOR LOW K - Abnormal; Notable for the following components:    Glucose 115 (*)     BUN 32 (*)     CREATININE 1.3 (*)     GFR Non- 39 (*)     GFR  48 (*)     ALT 7 (*)     AST 13 (*)     All other components within normal limits    Narrative:     Performed at:  [facility] [address], [place]   Phone [phone]  - Abnormal; Notable for the following components:    Pro-BNP 4,196 (*)     All other components within normal limits    Narrative:     Performed at:  [facility] [address] [place],  [place], [place]   Phone [phone]   PROTIME-INR - Abnormal; Notable for the following components:    Protime 13.5 (*)     INR 1.19 (*)     All other components within normal limits    Narrative:     Performed at:  [facility]  [address] [place]   Phone [phone]   APTT - Abnormal; Notable for the following components:    aPTT 42.5 (*)     All other components within normal limits    Narrative:     Performed at:  [facility]  [address], [place]   Phone [phone]   TROPONIN    Narrative:     Performed at:  [facility]  [address],  [place], [address] [place]   Phone [phone]   URINE RT REFLEX TO CULTURE     RADIOLOGY:     Plain x-rays were viewed by me:   CTA HEAD NECK W CONTRAST   Final Result   No large vessel occlusion visualized within the head or neck. Severe stenosis of the proximal left PCA. Mild-to-moderate stenoses at the origins of the vertebral arteries. Incidentally noted pulmonary emphysema. RECOMMENDATIONS:   Unavailable         CT HEAD WO CONTRAST   Final Result   No acute intracranial abnormality. Stable remote infarct in the right frontal lobe. Chronic small vessel white   matter ischemic changes. XR CHEST PORTABLE   Final Result   1. Improved with mild residual congestive heart failure. EKG:  Read by me in the absence of a cardiologist shows:   Atrial paced rhythm, rate 72, PVCs, poor R wave progression, nonspecific ST-T wave abnormality, LVH, prior EKG not available    Vitals:    01/22/22 1340 01/22/22 1500 01/22/22 1550   BP: (!) 151/77 (!) 127/48 (!) 157/64   Pulse: 75 70    Resp: 16 17    Temp: 97.9 °F (36.6 °C)     TempSrc: Oral     SpO2: 96% 97%    Weight: 124 lb 14.4 oz (56.7 kg)     Height: 5' 3\" (1.6 m)       FINAL IMPRESSION:    1.  Dizziness       DISPOSITION Admitted 01/22/2022 05:23:28 PM       Bess Strickland MD  01/22/22 1820

## 2022-01-23 LAB
ANION GAP SERPL CALCULATED.3IONS-SCNC: 13 MMOL/L (ref 3–16)
BUN BLDV-MCNC: 30 MG/DL (ref 7–20)
CALCIUM SERPL-MCNC: 9 MG/DL (ref 8.3–10.6)
CHLORIDE BLD-SCNC: 107 MMOL/L (ref 99–110)
CHOLESTEROL, TOTAL: 135 MG/DL (ref 0–199)
CO2: 20 MMOL/L (ref 21–32)
CREAT SERPL-MCNC: 1.3 MG/DL (ref 0.6–1.2)
EKG ATRIAL RATE: 70 BPM
EKG DIAGNOSIS: NORMAL
EKG P AXIS: 45 DEGREES
EKG P-R INTERVAL: 308 MS
EKG Q-T INTERVAL: 424 MS
EKG QRS DURATION: 102 MS
EKG QTC CALCULATION (BAZETT): 464 MS
EKG R AXIS: -15 DEGREES
EKG T AXIS: 43 DEGREES
EKG VENTRICULAR RATE: 72 BPM
ESTIMATED AVERAGE GLUCOSE: 111.2 MG/DL
GFR AFRICAN AMERICAN: 48
GFR NON-AFRICAN AMERICAN: 39
GLUCOSE BLD-MCNC: 83 MG/DL (ref 70–99)
HBA1C MFR BLD: 5.5 %
HCT VFR BLD CALC: 42.5 % (ref 36–48)
HDLC SERPL-MCNC: 27 MG/DL (ref 40–60)
HEMOGLOBIN: 13.5 G/DL (ref 12–16)
LDL CHOLESTEROL CALCULATED: 63 MG/DL
MCH RBC QN AUTO: 26.6 PG (ref 26–34)
MCHC RBC AUTO-ENTMCNC: 31.8 G/DL (ref 31–36)
MCV RBC AUTO: 83.7 FL (ref 80–100)
PDW BLD-RTO: 21.3 % (ref 12.4–15.4)
PLATELET # BLD: 294 K/UL (ref 135–450)
PMV BLD AUTO: 8.9 FL (ref 5–10.5)
POTASSIUM SERPL-SCNC: 3.7 MMOL/L (ref 3.5–5.1)
RBC # BLD: 5.07 M/UL (ref 4–5.2)
SODIUM BLD-SCNC: 140 MMOL/L (ref 136–145)
TRIGL SERPL-MCNC: 227 MG/DL (ref 0–150)
VLDLC SERPL CALC-MCNC: 45 MG/DL
WBC # BLD: 7.1 K/UL (ref 4–11)

## 2022-01-23 PROCEDURE — 80061 LIPID PANEL: CPT

## 2022-01-23 PROCEDURE — 83036 HEMOGLOBIN GLYCOSYLATED A1C: CPT

## 2022-01-23 PROCEDURE — 93010 ELECTROCARDIOGRAM REPORT: CPT | Performed by: INTERNAL MEDICINE

## 2022-01-23 PROCEDURE — 36415 COLL VENOUS BLD VENIPUNCTURE: CPT

## 2022-01-23 PROCEDURE — 92610 EVALUATE SWALLOWING FUNCTION: CPT

## 2022-01-23 PROCEDURE — 85027 COMPLETE CBC AUTOMATED: CPT

## 2022-01-23 PROCEDURE — 80048 BASIC METABOLIC PNL TOTAL CA: CPT

## 2022-01-23 PROCEDURE — 6370000000 HC RX 637 (ALT 250 FOR IP): Performed by: NURSE PRACTITIONER

## 2022-01-23 PROCEDURE — 2060000000 HC ICU INTERMEDIATE R&B

## 2022-01-23 PROCEDURE — 6370000000 HC RX 637 (ALT 250 FOR IP): Performed by: FAMILY MEDICINE

## 2022-01-23 RX ORDER — LOPERAMIDE HYDROCHLORIDE 2 MG/1
2 CAPSULE ORAL 4 TIMES DAILY PRN
Status: DISCONTINUED | OUTPATIENT
Start: 2022-01-23 | End: 2022-01-24 | Stop reason: HOSPADM

## 2022-01-23 RX ORDER — FLUTICASONE PROPIONATE 50 MCG
2 SPRAY, SUSPENSION (ML) NASAL DAILY
Status: DISCONTINUED | OUTPATIENT
Start: 2022-01-23 | End: 2022-01-24 | Stop reason: HOSPADM

## 2022-01-23 RX ORDER — TOPIRAMATE 25 MG/1
50 TABLET ORAL DAILY
Status: DISCONTINUED | OUTPATIENT
Start: 2022-01-23 | End: 2022-01-24 | Stop reason: HOSPADM

## 2022-01-23 RX ORDER — ALLOPURINOL 100 MG/1
100 TABLET ORAL DAILY
Status: DISCONTINUED | OUTPATIENT
Start: 2022-01-23 | End: 2022-01-24 | Stop reason: HOSPADM

## 2022-01-23 RX ADMIN — APIXABAN 2.5 MG: 5 TABLET, FILM COATED ORAL at 09:42

## 2022-01-23 RX ADMIN — LOPERAMIDE HYDROCHLORIDE 2 MG: 2 CAPSULE ORAL at 16:38

## 2022-01-23 RX ADMIN — TOPIRAMATE 50 MG: 25 TABLET, FILM COATED ORAL at 16:38

## 2022-01-23 RX ADMIN — FLUTICASONE PROPIONATE 2 SPRAY: 50 SPRAY, METERED NASAL at 16:39

## 2022-01-23 RX ADMIN — ALLOPURINOL 100 MG: 100 TABLET ORAL at 16:38

## 2022-01-23 RX ADMIN — LEVOTHYROXINE SODIUM 112 MCG: 0.11 TABLET ORAL at 09:42

## 2022-01-23 RX ADMIN — APIXABAN 2.5 MG: 5 TABLET, FILM COATED ORAL at 20:57

## 2022-01-23 ASSESSMENT — PAIN SCALES - GENERAL
PAINLEVEL_OUTOF10: 0

## 2022-01-23 NOTE — PROGRESS NOTES
Admission complete. Pt alert and oriented x4. Telemetry on. Atrial paced on telemetry. Respirations even and unlabored. NIHSS complete in flowsheets. Medications verified and eliquis given per [facility]. Pt denies any further needs at this time. Call light within reach.

## 2022-01-23 NOTE — PROGRESS NOTES
NURSE SWALLOW SCREEN RESULTS    3 oz Water Swallow Screen: Pass    Additonal screening results:  Is the patient able to remain alert for testing?: Yes  Was the Patient Eating a Modified Diet Prior to being Admitted?: No  Is there an Existing PEG or Abdominal Feeding Tube?: No  Does the patient have a Head of Bed (HOB) restriction <30°?: No  Is there a Tracheostomy Tube Present?: No  Was the Patient Eating a Modified Diet Prior to being Admitted?: No    No additional actions taken due to Pass result. Speech therapy consult order verified. Patient may have regular texture diet as ordered by provider.

## 2022-01-23 NOTE — PROGRESS NOTES
Hospitalist Progress Note      PCP: Dylan Soares MD    Date of Admission: 1/22/2022    Chief Complaint: Dizziness for 2 weeks after starting [facility] Course:   Familia Baldwin is a 80 y.o. female with hh/o CVA, CHF, COPD , HTN, cardiomyopathy, neuropathy, AFib , pacemaker, chronic anticoagulation was sent by the cardiologist for evaluation of dizziness. The pt c/o dizziness, Lower extremity numbness, unilateral hearing loss and vertigo. Also has unsteady gait. The pt underwent DONA by Dr Mary Rocha on 01.12/21 that showed thrombus on the watchman device. The pt was started on Eliquis. The pt correlate the symptoms since then . She followed up with cardiology Dr. Ruiz Oneill today who sent her to the ED for further management  The pt denies focal muscle weakness. No facial droop, no change in speech or difficulty swallowing. Denies vision change or headache. CT head showed old frontal infarct . CTA head and neck showed occlusion involving the PCA . The pt does not think her Pacemaker is compatible with MRI.      Subjective: Patient sitting on side of bed. Daughter at bedside. Patient reports dizziness is much better. She has had dizziness before at home, but it got significantly worse yesterday. She has had some nasal congestion and her ears feel full. Denies chest pain shortness of breath Tatian headache lightheadedness abdominal pain nausea vomiting diarrhea dysuria. Reviewed plan of care with patient daughter, denied further needs or questions. Assessment/Plan:    Dizziness with transient hearing impairment  -Admitted to rule out CVA/TIA  -CT head showed remote infarct, otherwise nonacute  -CTA head showed severe stenosis of proximal left PCA, no large vessel occlusion  -Pacemaker has a pacemaker, [employer] that was placed January 2021. She is unsure if it is MRI compatible. Patient's pacemaker card does not state if it is or is not.   We will call cardiology to see if pacemaker is MRI compatible. If it is MRI compatible, we will then obtain MRI head. -Neurology consulted  -Continue neurochecks  -Continue aspirin and Eliquis  -Allergic to statins    Hypotension  -BP 98/68 in ER, received IV fluids  -Holding Lasix and metoprolol    Seasonal allergies  Cerumen impaction bilaterally  -May be because of increased dizziness  -Add Flonase  -Recommend Debrox at discharge    History of CKD stage III  -Family requested nephrology to see patient  -They evaluated patient, said okay to resume home torsemide on discharge, dose 10/20 alternate days  -Stable from renal point of view, continue follow-up with outpatient    Cardiac Thrombus  -per echo 1/12/22  -on AC, eliquis  -managed by cardiology outpatient    Active Hospital Problems    Diagnosis     Dizziness [R42]        Medications:  Reviewed    Infusion Medications   Scheduled Medications    aspirin  324 mg Oral Once    apixaban  2.5 mg Oral BID    levothyroxine  112 mcg Oral Daily     PRN Meds: nitroGLYCERIN, ondansetron **OR** ondansetron, polyethylene glycol    No intake or output data in the 24 hours ending 01/23/22 1314    Physical Exam Performed:    BP (!) 155/71   Pulse 70   Temp 97.5 °F (36.4 °C) (Oral)   Resp 17   Ht 5' 3\" (1.6 m)   Wt 127 lb 8 oz (57.8 kg)   SpO2 97%   BMI 22.59 kg/m²     General appearance: No apparent distress, appears stated age and cooperative. HEENT: Pupils equal, round, and reactive to light. Conjunctivae/corneas clear. Bilateral TMs with cerumen impaction. Bilateral nasal turbines inflamed, postnasal drip  Neck: Supple, with full range of motion. No jugular venous distention. Trachea midline. Respiratory:  Normal respiratory effort. Clear to auscultation, bilaterally without Rales/Wheezes/Rhonchi. Cardiovascular: Regular rate and rhythm with normal S1/S2 without murmurs, rubs or gallops. Abdomen: Soft, non-tender, non-distended with normal bowel sounds.   Musculoskeletal: No clubbing, cyanosis or edema bilaterally. Full range of motion without deformity. Skin: Skin color, texture, turgor normal.  No rashes or lesions. Discoloration to bilateral lower extremities from sitting on side of bed for prolonged period of time, toes on left foot dusky. Neurologic:  Neurovascularly intact without any focal sensory/motor deficits. Cranial nerves: II-XII intact, grossly non-focal.  Psychiatric: Alert and oriented, thought content appropriate, normal insight  Capillary Refill: Brisk,< 3 seconds   Peripheral Pulses: +2 palpable, equal bilaterally       Labs:   Recent Labs     01/22/22  1416 01/23/22  0422   WBC 7.8 7.1   HGB 13.4 13.5   HCT 42.8 42.5    294     Recent Labs     01/22/22  1416 01/23/22  0422    140   K 4.5 3.7    107   CO2 23 20*   BUN 32* 30*   CREATININE 1.3* 1.3*   CALCIUM 9.3 9.0     Recent Labs     01/22/22  1416   AST 13*   ALT 7*   BILITOT <0.2   ALKPHOS 94     Recent Labs     01/22/22  1416   INR 1.19*     Recent Labs     01/22/22  1416   TROPONINI <0.01       Urinalysis:      Lab Results   Component Value Date    NITRU Negative 01/22/2022    WBCUA 3 01/22/2022    BACTERIA RARE 08/16/2021    RBCUA 1 01/22/2022    BLOODU Negative 01/22/2022    SPECGRAV 1.025 01/22/2022    GLUCOSEU Negative 01/22/2022    GLUCOSEU NEGATIVE 12/04/2011       Radiology:  CTA HEAD NECK W CONTRAST   Final Result   No large vessel occlusion visualized within the head or neck. Severe stenosis of the proximal left PCA. Mild-to-moderate stenoses at the origins of the vertebral arteries. Incidentally noted pulmonary emphysema. RECOMMENDATIONS:   Unavailable         CT HEAD WO CONTRAST   Final Result   No acute intracranial abnormality. Stable remote infarct in the right frontal lobe. Chronic small vessel white   matter ischemic changes. XR CHEST PORTABLE   Final Result   1. Improved with mild residual congestive heart failure.                  DVT Prophylaxis: Eliquis  Diet: ADULT DIET; Regular  Code Status: Full Code    PT/OT Eval Status: Eval ordered    Dispo -home once medically stable    Alda Pompa APRN - CNP      NOTE:  This report was transcribed using voice recognition software. Every effort was made to ensure accuracy; however, inadvertent computerized transcription errors may be present.

## 2022-01-23 NOTE — CONSULTS
MD Sabas Wiley MD Darry Gilding, MD               Office: [phone]                      Fax: [phone]              [facility]                   NEPHROLOGY INITIAL CONSULT NOTE:     PATIENT NAME: Kendra Lincoln  : 1940  MRN: [id number]  REASON FOR CONSULT: For evaluation and management of Acute Kidney Injury . (My recommendations will be communicated by way of shared medical record.)    Requested by - family - pt's daughter - Melo Conception -who called me yesterday from ER) - discussed w/ ER team - MAYNOR Mckoy yesterday. RECOMMENDATIONS:   S/P IVF   Ok to resume home torsemide - on d/c - dose 10/20 alternative days   Holding other KAMERON blockade     Stable from renal POV  Has been following w/ me outpt- has a f/u appointment already       IMPRESSION:       Admitted for:  Dizziness [R42]    Proteinuric CKD-3B):   - BL CKD - stage 3B ,   - Scr ~ 1.2-1.3 --> progressed to 1.5-1.6  -> now better  - eGFR BL: 30s      - H/O Multiple bilateral renal cysts on previous CT   : Etiology for CKD : multifactorial - presumed due to HTN / HF /   - UA w/ micro - pro 20, ?UTI finding also, but she denies dysuria  - spot UPC : remains < 300 proteinuria. -  decreased Allopurinol (w/ lower eGFR)        Other problems:  H/O chronic systolic congestive heart failure   EF of 45%    Hyperkalemia - improving   - no longer on  Aldactone + refraining from higher K food like Tomatoes and potatoes.     Hypertensive kidney disease. With Chronic kidney disease.  Stage IIIB. H/O Coronary artery disease. Acidosis - non-AGMA       Other major problems: Management per primary and other consulting teams.   //         Hospital Problems           Last Modified POA    Dizziness 2022 Yes        : other supportive care :        Please refer to the orders. High Complexity. Multiple complex problems.   Discussed with patient and treatment team-    Time spent > 30 (~35) minutes. Thank you for allowing me to participate in this patient's care. Please do not hesitate to contact me anytime. We will follow along with you. Sita Barron MD,  [facility] [address] [place] [address]: [phone] or [address] [employer]  Fax: [phone]        =======================================================================================   =======================================================================================      CHIEF COMPLAINT:   Chief Complaint   Patient presents with    Dizziness     x 2 wks after starting eliquis.       History Obtained From:  patient + treatment team + Electronic Medical Records    HPI: Ms. Lul Magdaleno is a 80 y.o. female with significant past medical history as below:   Past Medical History:   Diagnosis Date    Allergic rhinitis, cause unspecified     Arthritis     Atrial fibrillation ([address]     Bronchopneumonia     CAD (coronary artery disease)     stent:  post cataract surgery (CABG)    Cerebral artery occlusion with cerebral infarction ([address]     TIA    Chronic gouty arthropathy     Chronic kidney disease     Congestive heart failure, unspecified     Degeneration of cervical intervertebral disc     Essential and other specified forms of tremor     Gout     HIGH CHOLESTEROL     History of CVA (cerebrovascular accident)     Hx of blood clots     Hypertension     Influenza 12/23/2017    Mitral valve stenosis and aortic valve insufficiency     Movement disorder     back problems    Pacemaker     Peptic ulcer, unspecified site, unspecified as acute or chronic, without mention of hemorrhage, perforation, or obstruction     Thyroid disease     Unspecified disorder of kidney and ureter     Unspecified hypothyroidism       Presents with Dizziness (x 2 wks after starting eliquis. )    Admitted with Dizziness [R42]   Found to have  CKD and her daughter called me concering about IV dye that she got in ER for stroke work up , so we are called for that. No current active complaints. Patient denied chest pain / dizziness/lightheadedness/syncope/ SOB / leg edema. *  Regarding: CKD  · Duration: chronic  · Location: kidneys  · Severity: Severe   · Timing: continous  · Context (ex: related to condition):  , refer to my assessment / impression. · Modifying factors (ex: medications, interventions):  , refer to my plan / recommendation. · Associated signs & symptoms (ex: edema, SOB): As mentioned above in CC and HPI      Past medical, Surgical, Social, Family medical history reviewed by me.      PAST MEDICAL HISTORY:   Past Medical History:   Diagnosis Date    Allergic rhinitis, cause unspecified     Arthritis     Atrial fibrillation ([address]     Bronchopneumonia     CAD (coronary artery disease)     stent:  post cataract surgery (CABG)    Cerebral artery occlusion with cerebral infarction (HCC)     TIA    Chronic gouty arthropathy     Chronic kidney disease     Congestive heart failure, unspecified     Degeneration of cervical intervertebral disc     Essential and other specified forms of tremor     Gout     HIGH CHOLESTEROL     History of CVA (cerebrovascular accident)     Hx of blood clots     Hypertension     Influenza 12/23/2017    Mitral valve stenosis and aortic valve insufficiency     Movement disorder     back problems    Pacemaker     Peptic ulcer, unspecified site, unspecified as acute or chronic, without mention of hemorrhage, perforation, or obstruction     Thyroid disease     Unspecified disorder of kidney and ureter     Unspecified hypothyroidism      PAST SURGICAL HISTORY:   Past Surgical History:   Procedure Laterality Date    BACK SURGERY      CARDIAC CATHETERIZATION  7/2012    CARDIAC CATHETERIZATION  08/07/2018    Unsuccesful  of RCA    CARDIAC SURGERY      CABG & Cardiac ablation    CHOLECYSTECTOMY      CORONARY ARTERY BYPASS GRAFT  1987    LIMA- Diag/LAD, SVG- RCA    FEMORAL BYPASS Left 2019    LEFT FEMORAL TO POPLITEAL BYPASS GRAFT performed by [facility] Morales MD at [facility] FEMORAL-FEMORAL BYPASS GRAFT N/A 2019    FEMORAL TO FEMORAL BYPASS performed [facility] MD at [facility] [address] Left 2017    Left hip pinning    IR KYPHOPLASTY LUMBAR FIRST LEVEL  2021    IR KYPHOPLASTY LUMBAR FIRST LEVEL 2021 [facility]    JOINT REPLACEMENT      IA NJX AA&/STRD TFRML EPI LUMBAR/SACRAL 1 LEVEL Right 12/3/2018    RIGHT L3 AND L4 LUMBAR TRANSFORAMINAL EPIRUAL STEROID INJECTION WITH FLUOROSCOPY performed by Brenna Donis MD at [facility]  2014    MARLENY - 3.0 x 28 to the Ist Diag    SHOULDER SURGERY      left     FAMILY HISTORY:   Family History   Problem Relation Age of Onset    Cancer Sister     Heart Disease Sister     Diabetes Brother     Hypertension Brother     Heart Disease Brother     Stroke Maternal Aunt     Heart Disease Maternal Aunt     Diabetes Maternal Uncle     Hypertension Paternal Aunt     Cancer Maternal Grandmother     Cancer Paternal Grandmother     Rheum Arthritis Neg Hx     Lupus Neg Hx      SOCIAL HISTORY:   Social History     Socioeconomic History    Marital status:       Spouse name: Zoila Amor Number of children: 3    Years of education: 15    Highest education level: None   Occupational History    None   Tobacco Use    Smoking status: Former Smoker     Packs/day: 1.00     Years: 28.00     Pack years: 28.00     Types: Cigarettes     Quit date: 1987     Years since quittin.0    Smokeless tobacco: Never Used    Tobacco comment: H.O.smoking at age 15 / smoked up to 1 p.p.d / quit    Vaping Use    Vaping Use: Never used   Substance and Sexual Activity    Alcohol use: Not Currently     Alcohol/week: 0.0 standard drinks    Drug use: No    Sexual activity: Not Currently   Other Topics Concern    None   Social History Narrative    None     Social Determinants of Health Financial Resource Strain:     Difficulty of Paying Living Expenses: Not on file   Food Insecurity:     Worried About Running Out of Food in the Last Year: Not on file    Twan of Food in the Last Year: Not on file   Transportation Needs:     Lack of Transportation (Medical): Not on file    Lack of Transportation (Non-Medical): Not on file   Physical Activity:     Days of Exercise per Week: Not on file    Minutes of Exercise per Session: Not on file   Stress:     Feeling of Stress : Not on file   Social Connections:     Frequency of Communication with Friends and Family: Not on file    Frequency of Social Gatherings with Friends and Family: Not on file    Attends Hoahaoism Services: Not on file    Active Member of [address] [place] [address] [employer] or Organizations: Not on file    Attends Club or Organization Meetings: Not on file    Marital Status: Not on file   Intimate Partner Violence:     Fear of Current or Ex-Partner: Not on file    Emotionally Abused: Not on file    Physically Abused: Not on file    Sexually Abused: Not on file   Housing Stability:     Unable to Pay for Housing in the Last Year: Not on file    Number of [place] in the Last Year: Not on file    Unstable Housing in the Last Year: Not on file         MEDICATIONS: reviewed by me. Medications Prior to Admission:  No current facility-administered medications on file prior to encounter. Current Outpatient Medications on File Prior to Encounter   Medication Sig Dispense Refill    apixaban (ELIQUIS) 2.5 MG TABS tablet Take 1 tablet by mouth 2 times daily 180 tablet 1    levothyroxine (SYNTHROID) 112 MCG tablet TAKE 1 TABLET EVERY DAY (NEED MD APPOINTMENT FOR REFILLS) 90 tablet 0    metoprolol succinate (TOPROL XL) 25 MG extended release tablet Take 0.5 tablets by mouth daily . 5 tablet daily 45 tablet 0    topiramate (TOPAMAX) 50 MG tablet TAKE 2 TABLETS TWICE DAILY 360 tablet 1    acetaminophen (TYLENOL) 325 MG tablet Take 1,000 mg by mouth daily       allopurinol (ZYLOPRIM) 100 MG tablet TAKE 1 TABLET EVERY DAY (ALONG WITH 300MG TABLET) 90 tablet 1    torsemide (DEMADEX) 10 MG tablet TAKE 1 TABLET EVERY OTHER DAY ALTERNATING WITH  2  TABLETS EVERY OTHER DAY (Patient taking differently: Take 20 mg by mouth daily TAKE 1 TABLET EVERY OTHER DAY ALTERNATING WITH  2  TABLETS EVERY OTHER DAY    PATIENT TAKING 20 MG DAILY FOR 5 DAYS THEN RETURN TO ALTERNATE) 135 tablet 1    vitamin D (ERGOCALCIFEROL) 1.25 MG (06448 UT) CAPS capsule Take 1 capsule by mouth once a week 12 capsule 1    betamethasone valerate (VALISONE) 0.1 % cream Apply topically 2 times daily.  30 g 5    aspirin EC 81 MG EC tablet Take 1 tablet by mouth daily 30 tablet 5    nitroGLYCERIN (NITROLINGUAL) 0.4 MG/SPRAY 0.4 mg spray Place 1 spray under the tongue every 5 minutes as needed for Chest pain 1 Bottle 3         Current Facility-Administered Medications:     aspirin chewable tablet 324 mg, 324 mg, Oral, Once, MAYNOR Ahumada    apixaban (ELIQUIS) tablet 2.5 mg, 2.5 mg, Oral, BID, Connor Moeller MD, 2.5 mg at 01/22/22 2311    levothyroxine (SYNTHROID) tablet 112 mcg, 112 mcg, Oral, Daily, Connor Moeller MD    nitroGLYCERIN (NITROLINGUAL) 0.4 mg spray 1 spray, 1 spray, SubLINGual, Q5 Min PRN, Connor Moeller MD    ondansetron (ZOFRAN-ODT) disintegrating tablet 4 mg, 4 mg, Oral, Q8H PRN **OR** ondansetron (ZOFRAN) injection 4 mg, 4 mg, IntraVENous, Q6H PRN, Connor Moeller MD    polyethylene glycol (GLYCOLAX) packet 17 g, 17 g, Oral, Daily PRN, Connor Moeller MD      Allergies reviewed by me: Aspirin, Diltiazem, Diltiazem hcl, Lorazepam, Sulfa antibiotics, Atorvastatin, Dabigatran, Mysoline [primidone], Nsaids, Other, and Primidone    REVIEW OF SYSTEMS:  As mentioned in chief complaint and HPI , Subjective   All other 10-point review of systems: negative.          =======================================================================================     PHYSICAL EXAM:  Recent of motion and neck supple. Skin:     General: Skin is dry. Coloration: Skin is not jaundiced. Neurological:      Mental Status: She is alert and oriented to person, place, and time. Mental status is at baseline. Psychiatric:         Mood and Affect: Mood normal.         Behavior: Behavior normal.              =======================================================================================     DATA:  Diagnostic tests reviewed by me for today's visit:   (AS NEEDED FOR MY EVALUATION AND MANAGEMENT). Recent Labs     01/22/22  1416 01/23/22  0422   WBC 7.8 7.1   HCT 42.8 42.5    294     Iron Saturation:  No components found for: PERCENTFE  FERRITIN:  No results found for: FERRITIN  IRON:    Lab Results   Component Value Date    IRON 54 11/09/2020     TIBC:    Lab Results   Component Value Date    TIBC 216 11/09/2020       Recent Labs     01/22/22  1416 01/23/22  0422    140   K 4.5 3.7    107   CO2 23 20*   BUN 32* 30*   CREATININE 1.3* 1.3*     Recent Labs     01/22/22  1416 01/23/22  0422   CALCIUM 9.3 9.0     No results for input(s): PH, PCO2, PO2 in the last 72 hours.     Invalid input(s): Deja Carrasco    ABG:  No results found for: PH, PCO2, PO2, HCO3, BE, THGB, TCO2, O2SAT  VBG:    Lab Results   Component Value Date    PHVEN 7.37 03/20/2017    DNP7KRQ 29.7 03/20/2017    BEVEN -7.1 03/20/2017    H6AHJLLW 99 03/20/2017       LDH:  No results found for: LDH  Uric Acid:    Lab Results   Component Value Date    LABURIC 4.8 05/28/2019       PT/INR:    Lab Results   Component Value Date    PROTIME 13.5 01/22/2022    INR 1.19 01/22/2022     Warfarin PT/INR:  No components found for: PTPATWAR, PTINRWAR  PTT:    Lab Results   Component Value Date    APTT 42.5 01/22/2022   [APTT}  Last 3 Troponin:    Lab Results   Component Value Date    TROPONINI <0.01 01/22/2022    TROPONINI <0.01 11/07/2020    TROPONINI 0.01 03/26/2020       U/A:    Lab Results   Component Value Date COLORU YELLOW 01/22/2022    PROTEINU Negative 01/22/2022    PHUR 5.0 01/22/2022    WBCUA 3 01/22/2022    RBCUA 1 01/22/2022    YEAST neg 03/09/2021    BACTERIA RARE 08/16/2021    CLARITYU Clear 01/22/2022    SPECGRAV 1.025 01/22/2022    LEUKOCYTESUR SMALL 01/22/2022    UROBILINOGEN 0.2 01/22/2022    BILIRUBINUR Negative 01/22/2022    BILIRUBINUR NEGATIVE 12/04/2011    BLOODU Negative 01/22/2022    GLUCOSEU Negative 01/22/2022    GLUCOSEU NEGATIVE 12/04/2011     Microalbumen/Creatinine ratio:  No components found for: RUCREAT  24 Hour Urine for Protein:  No components found for: RAWUPRO, UHRS3, GBYV77GN, UTV3  24 Hour Urine for Creatinine Clearance:  No components found for: CREAT4, UHRS10, UTV10  Urine Toxicology:  No components found for: IAMMENTA, IBARBIT, IBENZO, ICOCAINE, IMARTHC, IOPIATES, IPHENCYC    HgBA1c:    Lab Results   Component Value Date    LABA1C 5.4 03/25/2020     RPR:  No results found for: RPR  HIV:  No results found for: HIV  REMY:  No results found for: ANATITER, REMY  RF:  No results found for: RF  DSDNA:  No components found for: DNA  AMYLASE:  No results found for: AMYLASE  LIPASE:  No results found for: LIPASE  Fibrinogen Level:  No components found for: FIB       BELOW MENTIONED RADIOLOGY STUDY RESULTS BY ME (AS NEEDED FOR MY EVALUATION AND MANAGEMENT).      Echocardiogram transesophageal    Result Date: 1/12/2022  Transesophageal Echocardiography Report (DONA)  Demographics   Patient Name       Eliceo Worthington   Date of Study      01/12/2022        Gender              Female   Patient Number     [id number]        Date of Birth       1940   Visit Number       [id number]         Age                 80 year(s)   Accession Number   [id number]        Room Number         OP   Corporate ID       [id number]          Sonographer         Xi Diaz                                                           RDCS, RVT, BS   Ordering Physician Caitlyn Urena MD  Interpreting        Caitlyn Urena, MD                                       Physician  Procedure Type of Study   DONA procedure:ECHOCARDIOGRAM TRANSESOPHAGEAL. Procedure Date Date: 01/12/2022 Start: 12:10 PM Study Location: [facility] - Echo Lab Technical Quality: Good visualization Indications:Atrial fibrillation. Additional Indications:Post-Watchman. DONA, color Doppler, spectral Doppler and 3D performed by Dr. Marina Perry. Patient Status: Routine Height: 63 inches Weight: 128 pounds BSA: 1.6 m2 BMI: 22.67 kg/m2 HR: 70 bpm BP: 134/70 mmHg DONA Performed By: the attending and the sonographer  Type of Anesthesia: Moderate sedation   Conclusions   Summary  Normal left ventricle size, wall thickness, and systolic function with an  estimated ejection fraction of 55-60%. Mitral valve leaflets appear moderately thickened. The posterior leaflet  appears severely restricted. Moderate mitral regurgitation is present. Mitral annular calcification. Left atrial size is dilated. There is a Watchman device seated with a  feliciano-device leak measured at 2 mm on the anterior side. There is a small an echogenic mass on the surface of the device, consistent  with thrombus near the coumadin ridge. Mild aortic regurgitation. The thoracic aorta has moderate-severe plaque. Moderate tricuspid regurgitation. Signature   ------------------------------------------------------------------  Electronically signed by Anais Russell MD (Interpreting  physician) on 01/12/2022 at 03:54 PM  ------------------------------------------------------------------   Findings   Left Ventricle  Normal left ventricle size, wall thickness, and systolic function with an  estimated ejection fraction of 55-60%. Mitral Valve  Mitral valve leaflets appear moderately thickened. The posterior leaflet  appears severely restricted. Moderate mitral regurgitation is present. Mitral annular calcification. Left Atrium  Left atrial size is dilated.   There is a Watchman device seated with a feliciano-device leak measured at 2 mm  on the anterior side. There is a small an echogenic mass on the surface of the device, consistent  with thrombus near the coumadin ridge. Aortic Valve  The aortic valve is structurally normal.  Mild aortic regurgitation. Tricuspid aortic valve. Aorta  The aortic root is normal in size. The thoracic aorta has moderate-severe  plaque. Right Ventricle  The right ventricle is normal in size and function. Tricuspid Valve  The tricuspid valve is normal in structure and function. Moderate tricuspid regurgitation. Right ventricular systolic pressure cannot be accurately estimated based on  this study due to inadequate tricuspid regurgitation jet envelope. Right Atrium  The right atrial size is normal.   Pulmonic Valve  The pulmonic valve is not well visualized. Trivial pulmonic regurgitation present. Pericardial Effusion  No pericardial effusion noted. Pleural Effusion  No pleural effusion. Doppler Measurements   TR Velocity:292 cm/s  TR Gradient:34.11 mmHg      CT HEAD WO CONTRAST    Result Date: 1/22/2022  EXAMINATION: CT OF THE HEAD WITHOUT CONTRAST  1/22/2022 3:00 pm TECHNIQUE: CT of the head was performed without the administration of intravenous contrast. Dose modulation, iterative reconstruction, and/or weight based adjustment of the mA/kV was utilized to reduce the radiation dose to as low as reasonably achievable. COMPARISON: 01/12/2021. HISTORY: ORDERING SYSTEM PROVIDED HISTORY: dizzy TECHNOLOGIST PROVIDED HISTORY: Has a \"code stroke\" or \"stroke alert\" been called? ->No Reason for exam:->dizzy Decision Support Exception - unselect if not a suspected or confirmed emergency medical condition->Emergency Medical Condition ([place]) Reason for Exam: dizzy FINDINGS: BRAIN/VENTRICLES: There is no acute intracranial hemorrhage, mass effect or midline shift. No abnormal extra-axial fluid collection.   The gray-white differentiation is maintained without evidence of an acute infarct. There is no evidence of hydrocephalus. Focal encephalomalacia in the right frontal lobe consistent with a remote infarct, grossly stable. Decreased attenuation in the periventricular and subcortical white matter most consistent with small vessel ischemic change. Intracranial atherosclerotic vascular calcification. ORBITS: The visualized portion of the orbits demonstrate no acute abnormality. SINUSES: The visualized paranasal sinuses and mastoid air cells demonstrate no acute abnormality. SOFT TISSUES/SKULL:  No acute abnormality of the visualized skull or soft tissues. No acute intracranial abnormality. Stable remote infarct in the right frontal lobe. Chronic small vessel white matter ischemic changes. CT LUMBAR SPINE WO CONTRAST    Result Date: 12/30/2021  History: Back pain and right leg pain. COMPARISON: CT lumbar spine 4/16/2021. TECHNIQUE: Helically acquired axial unenhanced images were obtained through the lumbar spine with multiplanar reformats. Individualized dose optimization technique was used in order to meet ALARA standards for radiation dose reduction. In addition to vendor specific dose reduction algorithms, the dose reduction techniques vary based on the specific scanner utilized but frequently include automated exposure control, adjustment of the mA and/or kV according to patient size, and use of iterative reconstruction technique. FINDINGS: There is 1 to 2 mm grade 1 anterolisthesis of L3 on L4 and L4 on L5 without change. No other spondylolisthesis. There is severe disc space narrowing and osseous fusion across the L5-S1 intervertebral disc. Mild degenerative disc space narrowing  at each level from L2-L3 through L4-L5. Moderate degenerative disc space narrowing at L1-L2. No significant change.  Slight superior endplate anterior wedge compression deformity of L4 is not significant changed in severity with interval kyphoplasty with radiopaque cement present within the L4 vertebral body new since previous CT. No new fracture. No focal osseous lesion. L1-L2: Mild disc osteophyte bulge and moderate right and mild left facet hypertrophy. No central stenosis. Mild right foraminal stenosis. No left foraminal stenosis. L2-L3: Mild concentric disc bulge and mild bilateral facet hypertrophy. No central stenosis or significant foraminal stenosis. L3-L4: Concentric disc bulge and severe right greater than left facet hypertrophy. Mild central stenosis. Moderate right and minimal left foraminal stenosis. L4-L5: Mild concentric disc bulge and severe bilateral facet hypertrophy. At most mild central stenosis and mild bilateral foraminal stenosis. No significant change. L5-S1: Previous laminectomy. No central stenosis or foraminal stenosis. Incidental punctate nonobstructing calculi in the kidneys. Areas of cortical scarring in the left kidney. There are a few bilateral renal cysts. Paravertebral soft tissues are unremarkable. 1. Mild chronic L4 compression fracture with interval kyphoplasty. 2. No evidence of new fracture or acute abnormality. 3. Stable degenerative spondylosis as detailed above. XR CHEST PORTABLE    Result Date: 1/22/2022  EXAMINATION: ONE XRAY VIEW OF THE CHEST 1/22/2022 2:24 pm COMPARISON: 05/18/2021 HISTORY: ORDERING SYSTEM PROVIDED HISTORY: dizzy TECHNOLOGIST PROVIDED HISTORY: Reason for exam:->dizzy Reason for Exam: Dizziness (x 2 wks after starting eliquis. ) FINDINGS: There is improved aeration of the bilateral lungs with residual mild pulmonary vascular congestion. There is likely a small left pleural effusion with atelectasis. Cardiomegaly is stable status post median sternotomy, CABG and dual lead pacemaker. There is no pneumothorax. 1. Improved with mild residual congestive heart failure.      CTA HEAD NECK W CONTRAST    Result Date: 1/22/2022  EXAMINATION: CTA OF THE HEAD AND NECK WITH CONTRAST 1/22/2022 3:00 pm: TECHNIQUE: CTA of the head and neck was performed with the administration of intravenous contrast. Multiplanar reformatted images are provided for review. MIP images are provided for review. Stenosis of the internal carotid arteries measured using NASCET criteria. Dose modulation, iterative reconstruction, and/or weight based adjustment of the mA/kV was utilized to reduce the radiation dose to as low as reasonably achievable. COMPARISON: None. HISTORY: ORDERING SYSTEM PROVIDED HISTORY: dizzy TECHNOLOGIST PROVIDED HISTORY: Reason for exam:->dizzy Decision Support Exception - unselect if not a suspected or confirmed emergency medical condition->Emergency Medical Condition ([place]) Reason for Exam: dizzy FINDINGS: CTA NECK: AORTIC ARCH/ARCH VESSELS: No dissection or arterial injury. No significant stenosis of the brachiocephalic or subclavian arteries. CAROTID ARTERIES: No dissection, arterial injury, or hemodynamically significant stenosis by NASCET criteria. Atherosclerotic disease results in mild luminal narrowing of the proximal left cervical ICA. VERTEBRAL ARTERIES: No dissection, arterial injury, or significant stenosis. There is a mild stenosis at the origin of the right vertebral artery and a moderate stenosis at the origin of the left. SOFT TISSUES: There is mild pulmonary emphysema. BONES: No acute osseous abnormality. CTA HEAD: ANTERIOR CIRCULATION: No significant stenosis of the intracranial internal carotid, anterior cerebral, or middle cerebral arteries. No aneurysm. Carotid siphon calcifications are noted. POSTERIOR CIRCULATION: The vertebrobasilar system is within normal limits. There is a severe stenosis of the P2 segment of the left posterior cerebral artery. No aneurysm. OTHER: No dural venous sinus thrombosis on this non-dedicated study. BRAIN: See separately dictated noncontrast head CT report. No large vessel occlusion visualized within the head or neck. Severe stenosis of the proximal left PCA.  Mild-to-moderate stenoses at the origins of the vertebral arteries. Incidentally noted pulmonary emphysema.  RECOMMENDATIONS: Unavailable

## 2022-01-23 NOTE — PROGRESS NOTES
Speech Language Pathology  Facility/Department: [address]  Initial Assessment  DYSPHAGIA BEDSIDE SWALLOW EVALUATION     Patient: Lazara Todd   : 1940   MRN: [id number]      Evaluation Date: 2022   Admitting Diagnosis: Dizziness [R42]  Pain: 0/10                         H&P: Per chart review, \"Staci Puentes is a 80 y.o. female with past medical history of atrial fibrillation, CAD, previous TIA, chronic kidney disease, CHF, hyperlipidemia, hypertension, peptic ulcer disease and thyroid disease who presents to the ED with complaint of dizziness. Patient states she has history of atrial fibrillation and was on Plavix. Patient states she had a DONA done 2 weeks ago. States she was told that time that she had a blood clot on DONA. Patient states she was switched from Plavix to Eliquis at that time due to blood clot. Patient states ever since switching to Eliquis she has had feelings of dizziness. She states dizziness has been constant for the past 2 weeks but wax and wanes in intensity. Patient states dizziness is worsened when she sits up or changes positions. Patient states when she stands up she feels like she is off balance and going to fall over. She denies any falls or injuries. She states today she had worsening dizziness with a little bit of a headache. Became concerned and came to the ED for further evaluation and treatment. She denies worst headache of life or thunderclap onset. Denies any visual changes, speech disturbances or numbness/tingling. Denies chest pain, shortness of breath, abdominal pain, nausea/vomiting, urinary symptoms or changes in bowel movements. She states she has had a history of vertigo in the past and states current symptoms do not feel similar at this time. \"    Chest xray 22:   Impression   1. Improved with mild residual congestive heart failure.        Modified Barium Swallow Study: none    History/Prior Level of Function:   Prior Dysphagia History: none reported     Dysphagia Impressions/Diagnosis: Oropharyngeal Dysphagia   Pt sitting upright in bed willing to participate in evaluation. Presented a variety of trials in order to assess swallow function. Pt reports no overt difficulties with swallow function. Pt presents with mild oropharyngeal dysphagia characterized by slightly prolonged mastication and minimal oral residue. Pt with min throat clearing after some trials of thin liquids, however pt was clearing throat prior to consumption. Recommend regular texture, thin liquids, meds as tolerated     Recommended Diet and Intervention 1/23/2022:  Diet Solids Recommendation:  Regular  Liquid Consistency Recommendation: Thin liquids  Recommended form of Meds:  As tolerated      Compensatory Swallowing Strategies: Alternate solids/liquids , Eat/feed slowly    SHORT TERM DYSPHAGIA GOALS/PLAN OF CARE: Speech therapy for dysphagia tx 1-2 times to ensure diet tolerance. Pt will functionally tolerate recommended diet with no overt clinical s/s of aspiration    Dysphagia Therapeutic Intervention:  Diet Tolerance Monitoring     Discharge Recommendations: No further follow-up appears indicated at this time.      Patient Positioning: Upright in bed    Current Diet Level (prior to evaluation): Regular texture diet with  Thin liquids      Respiratory Status:   [x]Room Air   []O2 via nasal cannula   []Other:    Dentition:  []Adequate  [x]Dentures   []Missing Many Teeth  []Edentulous  []Other:    Baseline Vocal Quality:  [x]Normal  []Dysphonic   []Aphonic   []Hoarse  []Wet  []Weak  []Other:    Volitional Cough:  [x]Strong  []Weak  []Wet  []Absent  []Congested  []Other:    Volitional Swallow:   []Absent   []Delayed     [x]Adequate     []Required use of drink     Oral Mechanism Exam:  [x]WFL []Mild   [] Moderate  []Severe  []To be assessed  Impaired:   []Left side      []Right side    []Labial ROM/Coordination    []Labial Symmetry   []Lingual ROM/Coordination   []Lingual Symmetry  []Gag  []Other:     Oral Phase: []WFL [x]Mild   [] Moderate  []Severe  []To be assessed   [x]Impaired/Prolonged Mastication:   []Spillage Left:   []Spillage Right:  []Pocketing Left:   []Pocketing Right:   []Decreased Anterior to Posterior Transit:   []Suspected Premature Bolus Loss:   [x]Lingual/Palatal Residue:   []Other:     Pharyngeal Phase: []WFL [x]Mild   [] Moderate  []Severe  []To be assessed   []Delayed Swallow:   []Suspected Pharyngeal Pooling:   []Decreased Laryngeal Elevation:   []Absent Swallow:  []Wet Vocal Quality:   []Throat Clearing-Immediate:   [x]Throat Clearing-Delayed: thin liquids    []Cough-Immediate:   []Cough-Delayed:  []Change in Vital Signs:  []Suspected Delayed Pharyngeal Clearing:  []Other:       Eating Assistance:  [x]Independent  []Setup or clean-up assistance   [] Supervision or touching assistance   [] Partial or moderate assistance   [] Substantial or maximal assistance  [] Dependent       EDUCATION:   Provided education regarding role of SLP, results of assessment, recommendations and general speech pathology plan of care. [x] Pt verbalized understanding and agreement   [] Pt requires ongoing learning   [] No evidence of comprehension     If patient discharges prior to next visit, this note will serve as discharge. Timed Code Minutes: 0 minutes   Total Treatment Minutes: 19 minutes     Electronically signed by:    Becky SALDANA  [address] [place], [place] [id number]  Speech-Language Pathologist   1/23/2022 9:17 AM

## 2022-01-24 VITALS
WEIGHT: 126 LBS | BODY MASS INDEX: 22.32 KG/M2 | HEART RATE: 77 BPM | TEMPERATURE: 97.8 F | OXYGEN SATURATION: 97 % | DIASTOLIC BLOOD PRESSURE: 73 MMHG | SYSTOLIC BLOOD PRESSURE: 170 MMHG | RESPIRATION RATE: 18 BRPM | HEIGHT: 63 IN

## 2022-01-24 LAB
ANION GAP SERPL CALCULATED.3IONS-SCNC: 11 MMOL/L (ref 3–16)
BUN BLDV-MCNC: 24 MG/DL (ref 7–20)
CALCIUM SERPL-MCNC: 8.6 MG/DL (ref 8.3–10.6)
CHLORIDE BLD-SCNC: 109 MMOL/L (ref 99–110)
CO2: 20 MMOL/L (ref 21–32)
CREAT SERPL-MCNC: 1.3 MG/DL (ref 0.6–1.2)
GFR AFRICAN AMERICAN: 48
GFR NON-AFRICAN AMERICAN: 39
GLUCOSE BLD-MCNC: 92 MG/DL (ref 70–99)
HCT VFR BLD CALC: 42.2 % (ref 36–48)
HEMOGLOBIN: 13.5 G/DL (ref 12–16)
MCH RBC QN AUTO: 26.4 PG (ref 26–34)
MCHC RBC AUTO-ENTMCNC: 31.9 G/DL (ref 31–36)
MCV RBC AUTO: 82.9 FL (ref 80–100)
PDW BLD-RTO: 21 % (ref 12.4–15.4)
PLATELET # BLD: 259 K/UL (ref 135–450)
PMV BLD AUTO: 8.9 FL (ref 5–10.5)
POTASSIUM SERPL-SCNC: 4 MMOL/L (ref 3.5–5.1)
RBC # BLD: 5.09 M/UL (ref 4–5.2)
SODIUM BLD-SCNC: 140 MMOL/L (ref 136–145)
WBC # BLD: 7.5 K/UL (ref 4–11)

## 2022-01-24 PROCEDURE — 36415 COLL VENOUS BLD VENIPUNCTURE: CPT

## 2022-01-24 PROCEDURE — 97165 OT EVAL LOW COMPLEX 30 MIN: CPT

## 2022-01-24 PROCEDURE — 6370000000 HC RX 637 (ALT 250 FOR IP): Performed by: FAMILY MEDICINE

## 2022-01-24 PROCEDURE — 80048 BASIC METABOLIC PNL TOTAL CA: CPT

## 2022-01-24 PROCEDURE — 6370000000 HC RX 637 (ALT 250 FOR IP): Performed by: NURSE PRACTITIONER

## 2022-01-24 PROCEDURE — 99222 1ST HOSP IP/OBS MODERATE 55: CPT | Performed by: PSYCHIATRY & NEUROLOGY

## 2022-01-24 PROCEDURE — 85027 COMPLETE CBC AUTOMATED: CPT

## 2022-01-24 PROCEDURE — 97530 THERAPEUTIC ACTIVITIES: CPT

## 2022-01-24 PROCEDURE — 92526 ORAL FUNCTION THERAPY: CPT

## 2022-01-24 PROCEDURE — 97116 GAIT TRAINING THERAPY: CPT

## 2022-01-24 PROCEDURE — 97161 PT EVAL LOW COMPLEX 20 MIN: CPT

## 2022-01-24 PROCEDURE — 6370000000 HC RX 637 (ALT 250 FOR IP): Performed by: INTERNAL MEDICINE

## 2022-01-24 RX ORDER — LISINOPRIL 10 MG/1
10 TABLET ORAL DAILY
Status: DISCONTINUED | OUTPATIENT
Start: 2022-01-24 | End: 2022-01-24 | Stop reason: HOSPADM

## 2022-01-24 RX ORDER — FLUTICASONE PROPIONATE 50 MCG
2 SPRAY, SUSPENSION (ML) NASAL DAILY
Qty: 16 G | Refills: 0 | Status: SHIPPED | OUTPATIENT
Start: 2022-01-25

## 2022-01-24 RX ORDER — TORSEMIDE 20 MG/1
10 TABLET ORAL DAILY
Status: DISCONTINUED | OUTPATIENT
Start: 2022-01-24 | End: 2022-01-24 | Stop reason: HOSPADM

## 2022-01-24 RX ORDER — LISINOPRIL 10 MG/1
10 TABLET ORAL DAILY
Qty: 30 TABLET | Refills: 0 | Status: SHIPPED
Start: 2022-01-24 | End: 2022-01-26 | Stop reason: SINTOL

## 2022-01-24 RX ORDER — METOPROLOL SUCCINATE 25 MG/1
12.5 TABLET, EXTENDED RELEASE ORAL DAILY
Status: DISCONTINUED | OUTPATIENT
Start: 2022-01-24 | End: 2022-01-24 | Stop reason: HOSPADM

## 2022-01-24 RX ADMIN — METOPROLOL SUCCINATE 12.5 MG: 25 TABLET, EXTENDED RELEASE ORAL at 09:39

## 2022-01-24 RX ADMIN — LISINOPRIL 10 MG: 10 TABLET ORAL at 14:38

## 2022-01-24 RX ADMIN — ALLOPURINOL 100 MG: 100 TABLET ORAL at 08:32

## 2022-01-24 RX ADMIN — FLUTICASONE PROPIONATE 2 SPRAY: 50 SPRAY, METERED NASAL at 08:32

## 2022-01-24 RX ADMIN — TORSEMIDE 10 MG: 20 TABLET ORAL at 09:39

## 2022-01-24 RX ADMIN — LEVOTHYROXINE SODIUM 112 MCG: 0.11 TABLET ORAL at 08:01

## 2022-01-24 RX ADMIN — APIXABAN 2.5 MG: 5 TABLET, FILM COATED ORAL at 08:32

## 2022-01-24 RX ADMIN — TOPIRAMATE 50 MG: 25 TABLET, FILM COATED ORAL at 08:32

## 2022-01-24 ASSESSMENT — PAIN DESCRIPTION - PAIN TYPE: TYPE: ACUTE PAIN

## 2022-01-24 ASSESSMENT — ENCOUNTER SYMPTOMS
COUGH: 0
EYE DISCHARGE: 0
BLOOD IN STOOL: 0
NAUSEA: 0
ABDOMINAL DISTENTION: 0
ABDOMINAL PAIN: 0
EYE REDNESS: 0
PHOTOPHOBIA: 0
FACIAL SWELLING: 0
CONSTIPATION: 0
VOMITING: 0
SHORTNESS OF BREATH: 0
CHEST TIGHTNESS: 0
DIARRHEA: 0

## 2022-01-24 ASSESSMENT — PAIN SCALES - GENERAL
PAINLEVEL_OUTOF10: 0
PAINLEVEL_OUTOF10: 2

## 2022-01-24 ASSESSMENT — PAIN DESCRIPTION - LOCATION: LOCATION: HEAD

## 2022-01-24 NOTE — DISCHARGE SUMMARY
showed cardiac thrombus. Last echo in May 2021. Patient has a watchman in place, this can cause a false positive for PFO on bubble study for echo. Given that symptoms are fully resolved, will defer echo at this time.     Hypotension  -BP 98/68 in ER, received IV fluids  -Held torsemide and metoprolol  -Blood pressure improved and actually elevated. Torsemide and metoprolol were resumed. -Nephrology saw patient, and added lisinopril. Patient reported it has made her slightly cough in the past, but she is willing to try it again. States that she has a problem she will reach out to Dr. Denise Xavier    Seasonal allergies  Cerumen impaction bilaterally  -Likely cause of increased dizziness  - Flonase has significantly improved symptoms  -Recommend Debrox at discharge     History of CKD stage III  -Family requested nephrology to see patient  -They evaluated patient, said okay to resume home torsemide on discharge, dose 10/20 alternate days  -Stable from renal point of view, continue follow-up with outpatient     Cardiac Thrombus  -per DONA 1/12/22  -on [facility]  -managed by cardiology outpatient      Patient stated they felt well and wanted to go home. Patient denied chest pain, shortness of breath, palpitations, abdominal pain, nausea vomiting, diarrhea, dysuria, headache lightheadedness or dizziness. Appetite good. Voiding without difficulty. Reviewed plan of care with patient, verbalized understanding and agreement. Denied further questions or needs. Physical Exam Performed:     BP (!) 170/73   Pulse 77   Temp 97.8 °F (36.6 °C) (Oral)   Resp 18   Ht 5' 3\" (1.6 m)   Wt 126 lb (57.2 kg)   SpO2 97%   BMI 22.32 kg/m²       General appearance:  No apparent distress, appears stated age and cooperative. HEENT:  Normal cephalic, atraumatic without obvious deformity. Pupils equal, round, and reactive to light. Extra ocular muscles intact. Conjunctivae/corneas clear. Neck: Supple, with full range of motion. No jugular venous distention. Trachea midline. Respiratory:  Normal respiratory effort. Clear to auscultation, bilaterally without Rales/Wheezes/Rhonchi. Cardiovascular:  Regular rate and rhythm with normal S1/S2 without murmurs, rubs or gallops. Abdomen: Soft, non-tender, non-distended with normal bowel sounds. Musculoskeletal:  No clubbing, cyanosis or edema bilaterally. Full range of motion without deformity. Skin: Skin color, texture, turgor normal.  No rashes or lesions. Neurologic:  Neurovascularly intact without any focal sensory/motor deficits. Cranial nerves: II-XII intact, grossly non-focal.  Psychiatric:  Alert and oriented, thought content appropriate, normal insight  Capillary Refill: Brisk,< 3 seconds   Peripheral Pulses: +2 palpable, equal bilaterally       Labs: For convenience and continuity at follow-up the following most recent labs are provided:      CBC:    Lab Results   Component Value Date    WBC 7.5 01/24/2022    HGB 13.5 01/24/2022    HCT 42.2 01/24/2022     01/24/2022       Renal:    Lab Results   Component Value Date     01/24/2022    K 4.0 01/24/2022    K 4.5 01/22/2022     01/24/2022    CO2 20 01/24/2022    BUN 24 01/24/2022    CREATININE 1.3 01/24/2022    CALCIUM 8.6 01/24/2022    PHOS 4.1 12/20/2021         Significant Diagnostic Studies    Radiology:   CTA HEAD NECK W CONTRAST   Final Result   No large vessel occlusion visualized within the head or neck. Severe stenosis of the proximal left PCA. Mild-to-moderate stenoses at the origins of the vertebral arteries. Incidentally noted pulmonary emphysema. RECOMMENDATIONS:   Unavailable         CT HEAD WO CONTRAST   Final Result   No acute intracranial abnormality. Stable remote infarct in the right frontal lobe. Chronic small vessel white   matter ischemic changes. XR CHEST PORTABLE   Final Result   1. Improved with mild residual congestive heart failure.                 Consults: IP CONSULT TO NEUROLOGY    Disposition: Home    Condition at Discharge: Stable    Discharge Instructions/Follow-up:      Follow up with pcp in 1 week  Follow up with nephrology as directed  Recheck labs per nephrology    Obtain debrox for ear wax from pharmacy. Instill 5 drops in ear, lay on opposite side and let sit for 15 minuets. Then do other ear the same. Do this for 5 days, then ask PCP to look in ears so see if ear wax is removed. Check bp twice a day at home and record, review with pcp and nephrologist     Code Status:  Prior     Activity: activity as tolerated    Diet: regular diet      Discharge Medications:     Discharge Medication List as of 1/24/2022  4:44 PM           Details   fluticasone (FLONASE) 50 MCG/ACT nasal spray 2 sprays by Each Nostril route daily, Disp-16 g, R-0Normal      lisinopril (PRINIVIL;ZESTRIL) 10 MG tablet Take 1 tablet by mouth daily, Disp-30 tablet, R-0Normal              Details   apixaban (ELIQUIS) 2.5 MG TABS tablet Take 1 tablet by mouth 2 times daily, Disp-180 tablet, R-1Normal      levothyroxine (SYNTHROID) 112 MCG tablet TAKE 1 TABLET EVERY DAY (NEED MD APPOINTMENT FOR REFILLS), Disp-90 tablet, R-0Normal      metoprolol succinate (TOPROL XL) 25 MG extended release tablet Take 0.5 tablets by mouth daily . 5 tablet daily, Disp-45 tablet, R-0Adjust Sig      topiramate (TOPAMAX) 50 MG tablet TAKE 2 TABLETS TWICE DAILY, Disp-360 tablet, R-1Normal      vitamin D (ERGOCALCIFEROL) 1.25 MG (39684 UT) CAPS capsule Take 1 capsule by mouth once a week, Disp-12 capsule, R-1Normal      acetaminophen (TYLENOL) 325 MG tablet Take 1,000 mg by mouth daily Historical Med      betamethasone valerate (VALISONE) 0.1 % cream Apply topically 2 times daily. , Disp-30 g, R-5, Normal      allopurinol (ZYLOPRIM) 100 MG tablet TAKE 1 TABLET EVERY DAY (ALONG WITH 300MG TABLET), Disp-90 tablet, R-1Normal      torsemide (DEMADEX) 10 MG tablet TAKE 1 TABLET EVERY OTHER DAY ALTERNATING WITH  2 TABLETS EVERY OTHER DAY, Disp-135 tablet, R-1Normal      aspirin EC 81 MG EC tablet Take 1 tablet by mouth daily, Disp-30 tablet, R-5NO PRINT      nitroGLYCERIN (NITROLINGUAL) 0.4 MG/SPRAY 0.4 mg spray Place 1 spray under the tongue every 5 minutes as needed for Chest pain, Disp-1 Bottle, R-3Normal             Time Spent on discharge is more than 30 minutes in the examination, evaluation, counseling and review of medications and discharge plan. Signed: TIFFANIE Cortez - CNP   1/25/2022    The patient was seen and examined on day of discharge and this discharge summary is in conjunction with any daily progress note from day of discharge. Thank you Dylan Soares MD for the opportunity to be involved in this patient's care. If you have any questions or concerns please feel free to contact me at [phone]. NOTE:  This report was transcribed using voice recognition software. Every effort was made to ensure accuracy; however, inadvertent computerized transcription errors may be present.

## 2022-01-24 NOTE — PROGRESS NOTES
MRI questionaire filled out and faxed to MRI. At this time pt does not know if pacemaker is compatible for MRI and cardiology to see pt per hospitalist note.

## 2022-01-24 NOTE — PROGRESS NOTES
Occupational Therapy   Occupational Therapy Initial Assessment/Discharge Summary  Date: 2022   Patient Name: Dafne Quiroga  MRN: [id number]     : 1940    Date of Service: 2022    Discharge Recommendations:  Dafne Quiroga scored a  on the -[facility] ADL Inpatient form. At this time, no further OT is recommended upon discharge due to pt is close to her baseline level of occupational function. Recommend patient returns to prior setting. OT Equipment Recommendations  Equipment Needed: No    Assessment   Assessment: Pt is at her baseline level of occupational function. She is independent with bathing/dressing/toileting and ambulation at household distances. No dizziness throughout eval. No further OT needs at this time. Decision Making: Medium Complexity  History: Pt lives alone, I PTA, no recent falls. Exam: As above  Assistance / Modification: none  OT Education: OT Role  Patient Education: D/C recommendation. Pt independently verbalized and demonstrated understanding. Barriers to Learning: None  REQUIRES OT FOLLOW UP: No  Activity Tolerance  Activity Tolerance: Patient Tolerated treatment well  Safety Devices  Safety Devices in place: Yes  Type of devices: All fall risk precautions in place; Left in chair;Nurse notified;Call light within reach;Gait belt           Patient Diagnosis(es): The encounter diagnosis was Dizziness.      has a past medical history of Allergic rhinitis, cause unspecified, Arthritis, Atrial fibrillation ([address] Bronchopneumonia, CAD (coronary artery disease), Cerebral artery occlusion with cerebral infarction ([address] Chronic gouty arthropathy, Chronic kidney disease, Congestive heart failure, unspecified, Degeneration of cervical intervertebral disc, Essential and other specified forms of tremor, Gout, HIGH CHOLESTEROL, History of CVA (cerebrovascular accident), Hx of blood clots, Hypertension, Influenza, Mitral valve stenosis and aortic valve insufficiency, Movement disorder, Pacemaker, Peptic ulcer, unspecified site, unspecified as acute or chronic, without mention of hemorrhage, perforation, or obstruction, Thyroid disease, Unspecified disorder of kidney and ureter, and Unspecified hypothyroidism. has a past surgical history that includes back surgery; Cholecystectomy; Cardiac surgery; Coronary artery bypass graft (1987); shoulder surgery; Cardiac catheterization (7/2012); hip surgery (Left, 03/16/2017); joint replacement; Cardiac catheterization (08/07/2018); Percutaneous Transluminal Coronary Angio (11/04/2014); pr njx aa&/strd tfrml epi lumbar/sacral 1 level (Right, 12/3/2018); Femoral-femoral Bypass Graft (N/A, 8/22/2019); femoral bypass (Left, 8/22/2019); and IR KYPHOPLASTY LUMBAR 1 VERTEBRAL BODY (5/14/2021). Restrictions  Restrictions/Precautions  Restrictions/Precautions: Fall Risk (medium fall risk)  Required Braces or Orthoses?: No  Position Activity Restriction  Other position/activity restrictions: Kaylie Stokes is a 80 y.o. female with hh/o CVA, CHF, COPD , HTN, cardiomyopathy, neuropathy, AFib , pacemaker, chronic anticoagulation was sent by the cardiologist for evaluation of dizziness. The pt c/o dizziness, Lower extremity numbness, unilateral hearing loss and vertigo. Also has unsteady gait. The pt underwent DONA by Dr Kimi Lincoln on 01.12/21 that showed thrombus on the watchman device. The pt was started on Eliquis. The pt correlate the symptoms since then . She followed up with cardiology Dr. Hal Lagunas today who sent her to the ED for further managementThe pt denies focal muscle weakness. No facial droop, no change in speech or difficulty swallowing. Denies vision change or headache. CT head showed old frontal infarct . CTA head and neck showed occlusion involving the PCA . The pt does not think her Pacemaker is compatible with MRI.     Subjective   General  Chart Reviewed: Yes  Patient assessed for rehabilitation services?: Yes  Family / Caregiver Present: No  Diagnosis: Dizziness  Subjective  Subjective: Pt supine in bed, pleasant and agreeable to OT eval. Pt denies pain. Patient Currently in Pain: Denies  Pre Treatment Pain Screening  Intervention List: Patient able to continue with treatment  Vital Signs  Patient Currently in Pain: Denies  Social/Functional History  Social/Functional History  Lives With: Alone  Type of Home: House  Home Layout: One level  Home Access: Stairs to enter with rails,Level entry  Entrance Stairs - Number of Steps: 2 REGLA from front, level entry from back. Patient primarily uses back entrance. Entrance Stairs - Rails: Both  Bathroom Shower/Tub: Walk-in shower,Shower chair with back (walk-in shower with small ledge to step over)  Bathroom Toilet: Handicap height  Bathroom Equipment: Grab bars in shower,Grab bars around Zubka chair  Home Equipment: Wheelchair-manual,4 wheeled walker,Reacher,Grab bars,Alert [employer]  ADL Assistance: [address] [place] [address] Avenue: Independent  Homemaking Responsibilities: Yes  Ambulation Assistance: Independent (uses futniture to steady as needed)  Transfer Assistance: Independent  Active : Yes  Occupation: Retired  Leisure & Hobbies: watch TV, play games  Additional Comments: Patient reports no falls in previous 6 months. States that her daughter is avaliable to help whenever she is needed and is over about every other day.        Objective   Vision: Impaired  Vision Exceptions: Wears glasses for reading  Hearing: Within functional limits    Orientation  Overall Orientation Status: Within Normal Limits     Balance  Sitting Balance: Independent  Standing Balance: Stand by assistance (for longer distance ambulation; I household distances; no AD)  Standing Balance  Time: 7-8 min  Activity: functional mobility to/from bathroom to wash hands at sink, functional mobility ~225 ft in blevins and through room  Functional Mobility  Functional - Mobility Device: No device  Activity: To/from bathroom; Other  Assist Level: Stand by assistance  Functional Mobility Comments: I household distance, SBA longer distance. Pt denied dizziness, stated she felt a little unsteady. Pt also reported that she is not used to walking without shoes. ADL  Grooming: Independent (in stance at sink to wash hands)  LE Dressing: Independent (don/doff socks)  Toileting: Independent (per pt report)  Additional Comments: Pt reports toileting, bathing and grooming with set-up earlier today. Denies need. Tone RUE  RUE Tone: Normotonic  Tone LUE  LUE Tone: Normotonic  Coordination  Movements Are Fluid And Coordinated: Yes     Bed mobility  Supine to Sit: Modified independent (HOB slightly elevated)  Scooting: Independent  Transfers  Stand Step Transfers: Independent  Sit to stand: Independent  Stand to sit: Independent  Vision - Basic Assessment  Visual Field Cut: No  Oculo Motor Control: WNL  Vision Comments: slight dizziness when looking down, slight nystagmus noted. Cognition  Overall Cognitive Status: WNL  Perception  Overall Perceptual Status: WFL     Sensation  Overall Sensation Status: Impaired (able to localize light touch B LEs; sensation diminished LLE compared to R LE. UEs WFL)        LUE AROM (degrees)  LUE AROM : WNL  Left Hand AROM (degrees)  Left Hand AROM: WNL  RUE AROM (degrees)  RUE AROM : WNL  Right Hand AROM (degrees)  Right Hand AROM: WNL  LUE Strength  Gross LUE Strength: WFL  L Hand General: 4/5  RUE Strength  Gross RUE Strength: WFL  R Hand General: 4/5  RUE Strength Comment: No detected differences in strength L compared to R. Plan   Plan  Plan Comment: D/C acute OT. No OT needs.     AM-PAC Score        AM-PAC Inpatient Daily Activity Raw Score: 24 (01/24/22 1243)  AM-PAC Inpatient ADL T-Scale Score : 57.54 (01/24/22 1243)  ADL Inpatient CMS 0-100% Score: 0 (01/24/22 1243)  ADL Inpatient CMS G-Code Modifier : CH (01/24/22 1243)    Goals: None          Therapy Time   Individual Concurrent Group Co-treatment   Time In 1143         Time Out 1221         Minutes 38               Timed Code Treatment Minutes:  23 Minutes    Total Treatment Minutes: 45 min     C/ [address], [address], Xiao Conde., OTR/L, [id number]

## 2022-01-24 NOTE — CONSULTS
In patient Neurology consult        [facility] Neurology      MD Candy Harper  1940    Date of Service: 1/24/2022    Referring Physician: Mame Rojas MD      Reason for the consult and CC: New onset dizziness    HPI:   The patient is a 80y.o.  years old female with multiple copolymer was admitted to the hospital yesterday with new onset dizziness. Symptoms started few days ago. She felt her symptoms started after starting Eliquis. Description is lightheadedness with no spinning sensation. Degree was mild to moderate but persistent. No fall or injury. No other leaving aggravating factors. No chest pain, headache or dysphagia. Patient was admitted to the hospital for her current symptoms. She got CT and CTA that showed no acute stroke or large vessel occlusion. He has a pacemaker which is not compatible with MRI. Today she feels back to her baseline. No residual deficit. No headache, dysphagia dysarthria or dizziness. She is on Eliquis twice daily. Other review of system was unremarkable.       Family History   Problem Relation Age of Onset    Cancer Sister     Heart Disease Sister     Diabetes Brother     Hypertension Brother     Heart Disease Brother     Stroke Maternal Aunt     Heart Disease Maternal Aunt     Diabetes Maternal Uncle     Hypertension Paternal Aunt     Cancer Maternal Grandmother     Cancer Paternal Grandmother     Rheum Arthritis Neg Hx     Lupus Neg Hx      Past Surgical History:   Procedure Laterality Date    BACK SURGERY      CARDIAC CATHETERIZATION  7/2012    CARDIAC CATHETERIZATION  08/07/2018    Unsuccesful  of RCA    CARDIAC SURGERY      CABG & Cardiac ablation    CHOLECYSTECTOMY      CORONARY ARTERY BYPASS GRAFT  1987    LIMA- Diag/LAD, SVG- RCA    FEMORAL BYPASS Left 8/22/2019    LEFT FEMORAL TO POPLITEAL BYPASS GRAFT performed by Erika Dempsey MD at [facility] FEMORAL-FEMORAL BYPASS GRAFT N/A 8/22/2019 Currently     Alcohol/week: 0.0 standard drinks    Drug use: No     Allergies   Allergen Reactions    Aspirin Nausea Only    Diltiazem Anaphylaxis    Diltiazem Hcl Anaphylaxis    Lorazepam Other (See Comments)     hallucinations  hallucinations  hallucinations    Sulfa Antibiotics Rash and Hives    Atorvastatin Other (See Comments)     Muscle pains  Muscle pains  Muscle pains    Dabigatran Nausea Only     And indigestion  And indigestion    Aka Pradaxa  And indigestion  And indigestion  And indigestion    Aka Pradaxa  And indigestion  And indigestion  And indigestion    Aka Pradaxa    Mysoline [Primidone]     Nsaids      Other reaction(s): Unknown    Other Other (See Comments)     Nitroglycerin patches causes severe headaches    Primidone      Other reaction(s): Unknown     Current Facility-Administered Medications   Medication Dose Route Frequency Provider Last Rate Last Admin    metoprolol succinate (TOPROL XL) extended release tablet 12.5 mg  12.5 mg Oral Daily Ararat Fat, APRN - CNP   12.5 mg at 01/24/22 0939    torsemide (DEMADEX) tablet 10 mg  10 mg Oral Daily Xenia Lind APRN - CNP   10 mg at 01/24/22 5751    perflutren lipid microspheres (DEFINITY) injection 1.65 mg  1.5 mL IntraVENous ONCE PRN Xenia Lind APRN - CNP        lisinopril (PRINIVIL;ZESTRIL) tablet 10 mg  10 mg Oral Daily Miky Jennings MD        fluticasone (FLONASE) 50 MCG/ACT nasal spray 2 spray  2 spray Each Nostril Daily Alda Fat, APRN - CNP   2 spray at 01/24/22 1762    topiramate (TOPAMAX) tablet 50 mg  50 mg Oral Daily Xenia Lind APRN - CNP   50 mg at 01/24/22 1012    allopurinol (ZYLOPRIM) tablet 100 mg  100 mg Oral Daily Ararat Fat, APRN - CNP   100 mg at 01/24/22 8691    loperamide (IMODIUM) capsule 2 mg  2 mg Oral 4x Daily PRN Alda Fat, APRN - CNP   2 mg at 01/23/22 1638    aspirin chewable tablet 324 mg  324 mg Oral Once [place], [place]  apixaban (ELIQUIS) tablet 2.5 mg  2.5 mg Oral BID Angela Gomez MD   2.5 mg at 01/24/22 5630    levothyroxine (SYNTHROID) tablet 112 mcg  112 mcg Oral Daily Angela Gomez MD   112 mcg at 01/24/22 0801    nitroGLYCERIN (NITROLINGUAL) 0.4 mg spray 1 spray  1 spray SubLINGual Q5 Min PRN Angela Gomez MD        ondansetron (ZOFRAN-ODT) disintegrating tablet 4 mg  4 mg Oral Q8H PRN Angela Gomez MD        Or    ondansetron (ZOFRAN) injection 4 mg  4 mg IntraVENous Q6H PRN Angela Gomez MD        polyethylene glycol (GLYCOLAX) packet 17 g  17 g Oral Daily PRN Angela Gomez MD           ROS : A 10-14 system review of constitutional, cardiovascular, respiratory, eyes, musculoskeletal, endocrine, GI, ENT, skin, hematological, genitourinary, psychiatric and neurologic systems was obtained and updated today and is unremarkable except as mentioned in my HPI      Exam:     Constitutional:   Vitals:    01/23/22 2350 01/24/22 0347 01/24/22 0731 01/24/22 0745   BP: (!) 145/74 (!) 157/65  (!) 172/84   Pulse: 71 74  75   Resp: 18 18  18   Temp: 97.8 °F (36.6 °C) 97.8 °F (36.6 °C)  97.7 °F (36.5 °C)   TempSrc: Temporal Oral  Oral   SpO2: 96% 94%  96%   Weight:   126 lb (57.2 kg)    Height:           General appearance:  Normal development and appear in no acute distress. Eye:  Fundus of the eye: Funduscopic examination cannot be performed due to COVID19 restrictions and precautions. Neck: supple  Cardiovascular: No lower leg edema with good pulsation. Mental Status:   Oriented to person, place, problem, and time. Memory: Good immediate recall. Intact remote memory  Normal attention span and concentration. Language: intact naming, repeating and fluency   Good fund of Knowledge. Aware of current events and vocabulary   Cranial Nerves:   II: Visual fields: Full. Pupils: equal, round, reactive to light  III,IV,VI: Extra Ocular Movements are intact.  No nystagmus  V: Facial sensation is intact   VII: Facial strength and movements: intact and symmetric  VIII: Hearing: Intact  IX: Palate elevation is symmetric  XI: Shoulder shrug is intact  XII: Tongue movements are normal  Musculoskeletal: 5/5 in all 4 extremities. Tone: Normal tone. Reflexes: 2+ in the upper extremity and 2+ in the lower extremity   Planters: flexor bilaterally. Coordination: no pronator drift, no dysmetria with FNF in upper extremities. Normal REM. Sensation: normal to all modalities in both arms and legs. Gait/Posture: steady gait    Data:  LABS:   Lab Results   Component Value Date     01/24/2022    K 4.0 01/24/2022    K 4.5 01/22/2022     01/24/2022    CO2 20 01/24/2022    BUN 24 01/24/2022    CREATININE 1.3 01/24/2022    GFRAA 48 01/24/2022    GFRAA 38 04/02/2013    LABGLOM 39 01/24/2022    GLUCOSE 92 01/24/2022    GLUCOSE 85 10/20/2021    PHOS 4.1 12/20/2021    MG 2.1 12/17/2020    CALCIUM 8.6 01/24/2022     Lab Results   Component Value Date    WBC 7.5 01/24/2022    RBC 5.09 01/24/2022    HGB 13.5 01/24/2022    HCT 42.2 01/24/2022    MCV 82.9 01/24/2022    RDW 21.0 01/24/2022     01/24/2022     Lab Results   Component Value Date    INR 1.19 (H) 01/22/2022    PROTIME 13.5 (H) 01/22/2022       Neuroimaging were independently reviewed by me and discussed results with the patient  Reviewed notes from different physicians  Reviewed lab and blood testing    Impression:  New onset dizziness and ataxia seems to be improving. Likely peripheral vertigo or could be drug-induced from Eliquis although I feel she is back to baseline today. Paroxysmal A. fib  Hypertension  Hypothyroid    Recommendation:  Vestibular precautions were discussed today  PT and OT  Continue Eliquis  Hydration  Blood pressure monitor at home  Continue current medication  Follow kidney function testing  Stroke prevention was discussed today  Can be discharged from neurology once medically stable  No further communication      Thank you for referring such patient.  If you have any questions regarding my consult note, please don't hesitate to call me. Kera Peña MD  [phone]    This dictation was generated by voice recognition computer software.  Although all attempts are made to edit the dictation for accuracy, there may be errors in the  transcription that are not intended

## 2022-01-24 NOTE — PROGRESS NOTES
Speech Language Pathology  Dysphagia Treatment Note/Discharge Summary    Name:  Colleen oLuis  :   1940  Medical Diagnosis:  Dizziness [R42]  Treatment Diagnosis: Oropharyngeal Dysphagia  Pain level: Declined    Diet level prior to treatment: Regular textures with thin liquids, meds as tolerated  Tolerance of Current Diet Level: Per chart review and pt report, no noted difficulty with current diet. Assessment of Texture Tolerance:  -Impressions: Pt upright in bed, alert and participative with treatment session. Pt accepting of thin liquids and regular solids to assess texture tolerance. Pt overall tolerating of thin liquids via cup/straw with no overt clinical s/s of aspiration. Regular solids revealed adequate mastication, oral transit and clearance. No overt difficulty or s/s of aspiration noted across consistencies of current diet, therefore further SLP services to target dysphagia not indicated at this time. Recommend continuation of regular textures with thin liquids, meds as tolerated. Diet and Treatment Recommendations 2022:  Recommend continuation of regular textures with thin liquids, meds as tolerated    Compensatory strategies: Upright as possible with all PO intake     Dysphagia Goals:   Pt will functionally tolerate recommended diet with no overt clinical s/s of aspiration GOAL MET    Plan: Further Dysphagia treatment not indicated at this time. Patient/Family Education:Education given to the Pt and nurse, who verbalized understanding    Discharge Recommendations: Speech Therapy for Dysphagia services not indicated at discharge. Timed Code Treatment: 0 minutes    Total Treatment Time: 8 minutes    If patient discharges prior to next session this note will serve as a discharge summary.        Signature:   Javier Stern., [id number] [facility] [id number]  Speech-Language Pathologist  2022 10:50 AM

## 2022-01-24 NOTE — PROGRESS NOTES
insufficiency, Movement disorder, Pacemaker, Peptic ulcer, unspecified site, unspecified as acute or chronic, without mention of hemorrhage, perforation, or obstruction, Thyroid disease, Unspecified disorder of kidney and ureter, and Unspecified hypothyroidism. has a past surgical history that includes back surgery; Cholecystectomy; Cardiac surgery; Coronary artery bypass graft (1987); shoulder surgery; Cardiac catheterization (7/2012); hip surgery (Left, 03/16/2017); joint replacement; Cardiac catheterization (08/07/2018); Percutaneous Transluminal Coronary Angio (11/04/2014); pr njx aa&/strd tfrml epi lumbar/sacral 1 level (Right, 12/3/2018); Femoral-femoral Bypass Graft (N/A, 8/22/2019); femoral bypass (Left, 8/22/2019); and IR KYPHOPLASTY LUMBAR 1 VERTEBRAL BODY (5/14/2021). Restrictions  Restrictions/Precautions  Restrictions/Precautions: Fall Risk (medium fall risk)  Required Braces or Orthoses?: No  Position Activity Restriction  Other position/activity restrictions: Francine Gonzales is a 80 y.o. female with hh/o CVA, CHF, COPD , HTN, cardiomyopathy, neuropathy, AFib , pacemaker, chronic anticoagulation was sent by the cardiologist for evaluation of dizziness. The pt c/o dizziness, Lower extremity numbness, unilateral hearing loss and vertigo. Also has unsteady gait. The pt underwent DONA by Dr Peyton Sotelo on 01.12/21 that showed thrombus on the watchman device. The pt was started on Eliquis. The pt correlate the symptoms since then . She followed up with cardiology Dr. Carlton Gu today who sent her to the ED for further managementThe pt denies focal muscle weakness. No facial droop, no change in speech or difficulty swallowing. Denies vision change or headache. CT head showed old frontal infarct . CTA head and neck showed occlusion involving the PCA . The pt does not think her Pacemaker is compatible with MRI.      Vision/Hearing  Vision: Impaired  Vision Exceptions: Wears glasses for reading  Hearing: Within functional limits       Subjective  General  Chart Reviewed: Yes  Patient assessed for rehabilitation services?: Yes  Family / Caregiver Present: No  Diagnosis: Dizziness  Follows Commands: Within Functional Limits  General Comment  Comments: Patient in semi-fowlers position upon arrivial.  Subjective  Subjective: States that she has been independent in her room and had just performed ADLs. Willing to participate in therapy. Pain Screening  Patient Currently in Pain: Denies    Orientation  Orientation  Overall Orientation Status: Within Normal Limits     Social/Functional History  Social/Functional History  Lives With: Alone  Type of Home: House  Home Layout: One level  Home Access: Stairs to enter with rails,Level entry  Entrance Stairs - Number of Steps: 2 REGLA from front, level entry from back. Patient primarily uses back entrance. Entrance Stairs - Rails: Both  Bathroom Shower/Tub: Walk-in shower,Shower chair with back (walk-in shower with small ledge to step over)  Bathroom Toilet: Handicap height  Bathroom Equipment: Grab bars in shower,Grab bars around [employer] chair  Home Equipment: Wheelchair-manual,4 wheeled walker,Reacher,Grab bars,Alert [employer]  ADL Assistance: [address] [place] [address]: Independent  Homemaking Responsibilities: Yes  Ambulation Assistance: Independent (uses furniture to steady as needed)  Transfer Assistance: Independent  Active : Yes  Occupation: Retired  Leisure & Hobbies: watch TV, play games  Additional Comments: Patient reports no falls in previous 6 months. States that her daughter is avaliable to help whenever she is needed and is over about every other day.      Objective  AROM RLE (degrees)  RLE AROM: WFL  AROM LLE (degrees)  LLE AROM : WFL  Strength RLE  Strength RLE: WFL  Comment: Not formally assessed; patient able to weight bear and move through full ROM  Strength LLE  Strength LLE: WFL  Comment: Not formally assessed; patient able to weight bear and move through full ROM     Sensation  Overall Sensation Status: Impaired (able to localize light touch B; sensation diminished LLE compared to RLE)     Bed mobility  Supine to Sit: Modified independent (HOB slightly elevated)  Scooting: Independent     Transfers  Sit to Stand: Independent  Stand to sit: Independent    Ambulation  Ambulation?: Yes  Ambulation 1  Surface: level tile  Device: No Device  Assistance: Independent;Stand by assistance  Gait Deviations: Slow Aida;Decreased step length  Distance: 230 feet  Comments: Patient is independent for household distances (first ~100 feet), but requires SBA with community distances. Patient states that she does not regularly walk more than about 50 feet at a time.   Stairs/Curb  Stairs?: No     Balance  Posture: Good  Sitting - Static: Good  Sitting - Dynamic: Good  Standing - Static: Good  Standing - Dynamic: Good      Plan   Safety Devices  Type of devices: Left in chair,Call light within reach,Gait belt,Nurse notified (no alarm; patient is a medium fall risk)  Restraints  Initially in place: No    AM-PAC Score  AM-PAC Inpatient Mobility Raw Score : 23 (01/24/22 1226)  AM-PAC Inpatient T-Scale Score : 56.93 (01/24/22 1226)  Mobility Inpatient CMS 0-100% Score: 11.2 (01/24/22 1226)  Mobility Inpatient CMS G-Code Modifier : CI (01/24/22 1226)     Therapy Time   Individual Concurrent Group Co-treatment   Time In 1143         Time Out 1221         Minutes 38         Timed Code Treatment Minutes: 21 Minutes   Renato Conteh, SPT    Reuben Jones PT    This evaluation/treatment was observed and supervised by Reuben Jones PT [id number]

## 2022-01-24 NOTE — PROGRESS NOTES
[facility] Note    Patient Active Problem List   Diagnosis    Mixed hyperlipidemia    Chronic gouty arthropathy    Acquired hypothyroidism    Non-rheumatic mitral regurgitation    COPD [facility]ronic obstructive pulmonary disease) [facility]    Restrictive lung disease    Sick sinus syndrome ([address]    Allergic rhinitis    Fibrocystic breast    Cerebrovascular accident (CVA) ([address]    HTN (hypertension), benign    Pacemaker    PAT (paroxysmal atrial tachycardia) ([facility]    Primary osteoarthritis involving multiple joints    Vitamin D deficiency    Tremor    Chronic total occlusion of native coronary artery    Age related osteoporosis    Chronic systolic heart failure ([facility])    Stage 3 chronic kidney disease ([address]    PAD (peripheral artery disease) ([address]    Atherosclerosis of native arteries of extremities with intermittent claudication, bilateral legs ([facility])    Idiopathic peripheral neuropathy    Ischemic cardiomyopathy    Dizziness    Peripheral vertigo, bilateral    PAF (paroxysmal atrial fibrillation) ([facility])    Dyslipidemia    Iron deficiency anemia    Anemia    Bilateral sensorineural hearing loss    Memory loss due to medical condition    Symptomatic bradycardia    Pacemaker lead failure    Recurrent falls while walking    Presence of Watchman left atrial appendage closure device       Past Medical History:   has a past medical history of Allergic rhinitis, cause unspecified, Arthritis, Atrial fibrillation ([address] Bronchopneumonia, CAD (coronary artery disease), Cerebral artery occlusion with cerebral infarction ([address] Chronic gouty arthropathy, Chronic kidney disease, Congestive heart failure, unspecified, Degeneration of cervical intervertebral disc, Essential and other specified forms of tremor, Gout, HIGH CHOLESTEROL, History of CVA (cerebrovascular accident), Hx of blood clots, Hypertension, Influenza, Mitral valve stenosis and (!) 168/62   01/05/22 134/70     Chest- clear  Heart-regular  Abd-soft  Ext- no edema    Labs  Hemoglobin   Date Value Ref Range Status   01/24/2022 13.5 12.0 - 16.0 g/dL Final     Hematocrit   Date Value Ref Range Status   01/24/2022 42.2 36.0 - 48.0 % Final     WBC   Date Value Ref Range Status   01/24/2022 7.5 4.0 - 11.0 K/uL Final     Platelets   Date Value Ref Range Status   01/24/2022 259 135 - 450 K/uL Final     Lab Results   Component Value Date    CREATININE 1.3 (H) 01/24/2022    BUN 24 (H) 01/24/2022     01/24/2022    K 4.0 01/24/2022     01/24/2022    CO2 20 (L) 01/24/2022       RECOMMENDATIONS:   S/P IVF   Start Lisinopril 10mg for uncontrolled HTN  Follow serum HCO3  Ok to resume home torsemide    Stable from renal POV  F/U with Dr. Andrew Torres.          IMPRESSION:        Admitted for:  Dizziness [R42]     Proteinuric CKD-3B):   stable  - BL CKD - stage 3B ,   - Scr ~ 1.2-1.3 --> progressed to 1.5-1.6  -> now better  - eGFR BL: 30s      - H/O Multiple bilateral renal cysts on previous CT   : Etiology for CKD : multifactorial - presumed due to HTN / HF /   - UA w/ micro - pro 20, ?UTI finding also, but she denies dysuria  - spot UPC : remains < 300 proteinuria. -  decreased Allopurinol (w/ lower eGFR)         Other problems:  H/O chronic systolic congestive heart failure   EF of 45%     Hyperkalemia - improved  - no longer on  Aldactone + refraining from higher K food like Tomatoes and potatoes.     Hypertensive kidney disease. With Chronic kidney disease.  Stage IIIB.     H/O Coronary artery disease.       Acidosis - non-AGMA         Other major problems: Management per primary and other consulting teams.    //                    Hospital Problems            Last Modified POA     Dizziness 1/22/2022 Yes            Miky Redmond MD

## 2022-01-24 NOTE — PROGRESS NOTES
Received MRI questionnaire, in which yes was marked for pacemaker. There was no order for MRI, but mentions of it in notes. Also received a phone call from a nurse practitioner, unsure of who specifically, stating she spoke to cardiology who is waiting on the cardiology form from us wanting to know how to proceed. I personally called [employer]. Patient has a [address] Pacemaker; Model [id number]; Serial Number [id number]. Although this device model is MRI conditional, the patient has an abandoned lead in her atrium, making all MRI scans unsafe. NP made aware.     AAYUSH Chowdhury, RT(MR)

## 2022-01-25 ENCOUNTER — CARE COORDINATION (OUTPATIENT)
Dept: CASE MANAGEMENT | Age: 82
End: 2022-01-25

## 2022-01-25 DIAGNOSIS — R25.1 TREMOR: Primary | ICD-10-CM

## 2022-01-25 PROCEDURE — 1111F DSCHRG MED/CURRENT MED MERGE: CPT | Performed by: FAMILY MEDICINE

## 2022-01-25 NOTE — CARE COORDINATION
[facility] Transitions Initial Follow Up Call    Call within 2 business days of discharge: Yes    Patient: Rufino Jc Patient : 1940   MRN: [id number]  Reason for Admission: Dizziness  Discharge Date: 22 RARS: Readmission Risk Score: 13.7 ( )      Last Discharge [facility]       Complaint Diagnosis Description Type Department Provider    22 Dizziness Dizziness ED to [facility]osp[facility]Discharged[facility] Pooja Wolff MD; Joaquina Hidalgo. .. Spoke with: DaughterCole Clipper: [facility]    Non-face-to-face services provided:  Obtained and reviewed discharge summary and/or continuity of care documents  Transitions of Care Initial Call    Was this an external facility discharge? No Discharge Facility:     Challenges to be reviewed by the provider   Additional needs identified to be addressed with provider: Yes  none  Pt's daughter stated that she will not give pt the Lisinopril as it makes her cough. Method of communication with provider : chart routing      Advance Care Planning:   Does patient have an Advance Directive: Reviewed and current  Was this a readmission? No  Patient stated reason for admission: Dizziness  Patients top risk factors for readmission: ineffective coping  medication management    Care Transition Nurse (CTN) contacted the family by telephone to perform post hospital discharge assessment. Verified name and  with family as identifiers. Provided introduction to self, and explanation of the CTN role. CTN reviewed discharge instructions, medical action plan and red flags with family who verbalized understanding. Family given an opportunity to ask questions and does not have any further questions or concerns at this time. Were discharge instructions available to patient? Yes.  Reviewed appropriate site of care based on symptoms and resources available to patient including: PCP, Specialist, Urgent care clinics, When to call 911 and [employer]. The family agrees to contact the PCP office for questions related to their healthcare. Medication reconciliation was performed with family, who verbalizes understanding of administration of home medications. Advised obtaining a 90-day supply of all daily and as-needed medications. Covid Risk Education     Educated patient about risk for severe COVID-19 due to risk factors according to CDC guidelines. LPN CC reviewed discharge instructions, medical action plan and red flag symptoms with the family who verbalized understanding. Discussed COVID vaccination status: Yes. Education provided on COVID-19 vaccination as appropriate. Discussed exposure protocols and quarantine with CDC Guidelines. Family was given an opportunity to verbalize any questions and concerns and agrees to contact LPN CC or health care provider for questions related to their healthcare. Reviewed and educated family on any new and changed medications related to discharge diagnosis. Was patient discharged with a pulse oximeter? No Discussed and confirmed pulse oximeter discharge instructions and when to notify provider or seek emergency care. LPN CC provided contact information. Plan for follow-up call in 5-7 days based on severity of symptoms and risk factors. Plan for next call: follow up appointment-Scheduled? This [address] spoke with pt's Daughter, Sole Hernandez and she stated that pt is doing well. Sole Hernandez denied any worsening symptoms. Denied fever, chills, N/V and any difficulty breathing at this time. Denied difficulty with urination, BMs or appetite. Denied chest pain and SOB. Medication review performed and patient verbalized understanding and is taking all medications as prescribed except the Lisinopril as Sole Hernandez stated that the pt cannot take this med because it makes her cough. Message routed to advise prescribing MD. Emmanuel Hyde also routed message to PCP Tim Mack to advise of need of hospital f/u.  Denied any other needs and concerns at this time. Advised pt to immediately report any worsening symptoms to the PCP. Patient verbalized understanding and agreed.   Billie Gaviria LPN, [facility]  PH: [phone]            Care Transitions 24 Hour Call    Schedule Follow Up Appointment with PCP: Completed  Do you have any ongoing symptoms?: No  Do you have a copy of your discharge instructions?: Yes  Do you have all of your prescriptions and are they filled?: No  Have you been contacted by a [address] Pharmacist?: No  Have you scheduled your follow up appointment?: No  Were you discharged with any Home Care or Post Acute Services: No  Post Acute Services: Home Health (Comment: [facility])  Patient DME: Other  Other Patient DME: grab bar in shower   Do you have support at home?: Partner/Spouse/SO  Do you feel like you have everything you need to keep you well at home?: Yes  Are you an active caregiver in your home?: No  Care Transitions Interventions  No Identified Needs         Follow Up  Future Appointments   Date Time Provider Triston Ford   3/1/2022  2:00 PM TIFFANIE Crowder - CNS [place] [facility]   3/4/2022  1:15 PM Brandyn Padilla MD [address]   3/24/2022  3:15 PM Marlon Cottrell MD AFL [facility] AFL NASO   4/12/2022  2:00 PM Nickolas Archer MD [facility] PLANO Cardio MMA   4/12/2022  2:30 PM SCHEDULE, [address]  Cardio MMA       Billie Gaviria LPN

## 2022-01-25 NOTE — PROGRESS NOTES
Patient refusing Lisinopril because of coughing. Switch to Losartan 25mg daily. She will call office to f/u with Dr. Tonny Green in 3 weeks. Discussed plan with patient and daughter.

## 2022-01-26 ENCOUNTER — OFFICE VISIT (OUTPATIENT)
Dept: FAMILY MEDICINE CLINIC | Age: 82
End: 2022-01-26
Payer: MEDICARE

## 2022-01-26 ENCOUNTER — TELEPHONE (OUTPATIENT)
Dept: FAMILY MEDICINE CLINIC | Age: 82
End: 2022-01-26

## 2022-01-26 VITALS
WEIGHT: 127 LBS | DIASTOLIC BLOOD PRESSURE: 76 MMHG | HEART RATE: 70 BPM | OXYGEN SATURATION: 99 % | BODY MASS INDEX: 22.5 KG/M2 | SYSTOLIC BLOOD PRESSURE: 110 MMHG | TEMPERATURE: 96.7 F

## 2022-01-26 DIAGNOSIS — I95.0 IDIOPATHIC HYPOTENSION: ICD-10-CM

## 2022-01-26 DIAGNOSIS — E03.9 ACQUIRED HYPOTHYROIDISM: ICD-10-CM

## 2022-01-26 DIAGNOSIS — M15.9 PRIMARY OSTEOARTHRITIS INVOLVING MULTIPLE JOINTS: ICD-10-CM

## 2022-01-26 DIAGNOSIS — I63.9 CEREBROVASCULAR ACCIDENT (CVA), UNSPECIFIED MECHANISM (HCC): ICD-10-CM

## 2022-01-26 DIAGNOSIS — N18.30 STAGE 3 CHRONIC KIDNEY DISEASE, UNSPECIFIED WHETHER STAGE 3A OR 3B CKD (HCC): ICD-10-CM

## 2022-01-26 DIAGNOSIS — R42 VERTIGO: Primary | ICD-10-CM

## 2022-01-26 DIAGNOSIS — H61.23 BILATERAL IMPACTED CERUMEN: ICD-10-CM

## 2022-01-26 PROBLEM — R29.6 RECURRENT FALLS WHILE WALKING: Status: RESOLVED | Noted: 2021-02-04 | Resolved: 2022-01-26

## 2022-01-26 PROCEDURE — 99495 TRANSJ CARE MGMT MOD F2F 14D: CPT | Performed by: FAMILY MEDICINE

## 2022-01-26 RX ORDER — HYDROCODONE BITARTRATE AND ACETAMINOPHEN 5; 325 MG/1; MG/1
1 TABLET ORAL 4 TIMES DAILY PRN
Qty: 120 TABLET | Refills: 0 | Status: SHIPPED | OUTPATIENT
Start: 2022-01-26 | End: 2022-03-04 | Stop reason: SDUPTHER

## 2022-01-26 RX ORDER — LOSARTAN POTASSIUM 25 MG/1
25 TABLET ORAL DAILY
Qty: 90 TABLET | Refills: 1 | Status: SHIPPED | OUTPATIENT
Start: 2022-01-26 | End: 2022-02-08

## 2022-01-26 RX ORDER — LEVOTHYROXINE SODIUM 0.12 MG/1
125 TABLET ORAL DAILY
Qty: 90 TABLET | Refills: 1 | Status: SHIPPED | OUTPATIENT
Start: 2022-01-26 | End: 2022-06-28 | Stop reason: SDUPTHER

## 2022-01-26 ASSESSMENT — PATIENT HEALTH QUESTIONNAIRE - PHQ9
2. FEELING DOWN, DEPRESSED OR HOPELESS: 0
1. LITTLE INTEREST OR PLEASURE IN DOING THINGS: 0
SUM OF ALL RESPONSES TO PHQ QUESTIONS 1-9: 0
SUM OF ALL RESPONSES TO PHQ9 QUESTIONS 1 & 2: 0
SUM OF ALL RESPONSES TO PHQ QUESTIONS 1-9: 0

## 2022-01-26 NOTE — PROGRESS NOTES
Ear Irrigation Procedure Note    Ear irrigation procedure was performed using a WaterPik / Elephant ear to the both ear(s) for cerumen impaction. The remaining wax and debris was removed with curette  instrumentation. The procedure was successful.   The patient had no complications

## 2022-01-26 NOTE — TELEPHONE ENCOUNTER
Pt given an appt today
ago)? Fever or Chills, Cough, Shortness of breath or   difficulty breathing, Loss of taste or smell, Sore throat, Nasal   congestion, Sneezing or runny nose, Fatigue or generalized body aches   (answer no if pain is specific to a body part e.g. back pain), Diarrhea,   Headache? No  Have you had close contact with someone with COVID-19 in the last 14 days? No  (Service Expert  click yes below to proceed with [employer] As Usual   Scheduling)?  Yes

## 2022-01-26 NOTE — PROGRESS NOTES
Subjective:      Patient ID: Kendra Lincoln is a 80 y.o. female. CC: Patient presents for hospital follow-up. HPI Patient presents due to a follow-up from [facility]. Patient went to the ED on 1/22/2022 due to dizziness and was admitted till 1/24/2022. Patient initially has severe dizziness and had hypotension. She was taken off her blood pressure medication and then by the time of discharge her blood pressure had increased to 288 systolically. She was to start losartan after she left the hospital but has not picked up her prescription yet for today. She had issues with lisinopril medication the past.  Patient underwent vascular studies of the brain demonstrating severe occlusion of the posterior cerebral artery but otherwise minimal plaque in other arteries. CT scan did demonstrate an old stroke and microvascular disease. She had a recent echocardiogram demonstrated a thrombus on the watchman device and was therefore started on anticoagulation of recent date. She feels her dizziness has significantly improved at this time. TSH was slightly elevated although it was in the normal range 1 year ago. Care Coordinator phone contact documented in Epic note for January 24, 2022.      Review of Systems     Patient Active Problem List   Diagnosis    Mixed hyperlipidemia    Chronic gouty arthropathy    Acquired hypothyroidism    Non-rheumatic mitral regurgitation    COPD (chronic obstructive pulmonary disease) (HCC)    Restrictive lung disease    Sick sinus syndrome (HCC)    Allergic rhinitis    Fibrocystic breast    [facility] ([facility]) [facility]    HTN (hypertension), benign    Pacemaker    PAT (paroxysmal atrial tachycardia) (HCC)    Primary osteoarthritis involving multiple joints    Vitamin D deficiency    Tremor    Chronic total occlusion of native coronary artery    Age related osteoporosis    Chronic systolic heart failure (HCC)    Stage 3 [facility]    PAD (peripheral artery disease) [facility]    Atherosclerosis of native arteries of extremities with intermittent claudication, bilateral legs (HCC)    Idiopathic peripheral neuropathy    Ischemic cardiomyopathy    Dizziness    Peripheral vertigo, bilateral    PAF (paroxysmal atrial fibrillation) (HCC)    Dyslipidemia    Iron deficiency anemia    Anemia    Bilateral sensorineural hearing loss    Memory loss due to medical condition    Symptomatic bradycardia    Pacemaker lead failure    Recurrent falls while walking    Presence of Watchman left atrial appendage closure device       No outpatient medications have been marked as taking for the 22 encounter (Office Visit) with Otto Lowry MD.       Allergies   Allergen Reactions    Aspirin Nausea Only    Diltiazem Anaphylaxis    Diltiazem Hcl Anaphylaxis    Lorazepam Other (See Comments)     hallucinations  hallucinations  hallucinations    Sulfa Antibiotics Rash and Hives    Atorvastatin Other (See Comments)     Muscle pains  Muscle pains  Muscle pains    Dabigatran Nausea Only     And indigestion  And indigestion    Aka Pradaxa  And indigestion  And indigestion  And indigestion    Aka Pradaxa  And indigestion  And indigestion  And indigestion    Aka Pradaxa    Mysoline [Primidone]     Nsaids      Other reaction(s): Unknown    Other Other (See Comments)     Nitroglycerin patches causes severe headaches    Primidone      Other reaction(s): Unknown       Social History     Tobacco Use    Smoking status: Former Smoker     Packs/day: 1.00     Years: 28.00     Pack years: 28.00     Types: Cigarettes     Quit date: 1987     Years since quittin.0    Smokeless tobacco: Never Used    Tobacco comment: H.O.smoking at age 15 / smoked up to 1 p.p.d / quit    Substance Use Topics    Alcohol use: Not Currently     Alcohol/week: 0.0 standard drinks       /76 (Site: Right Upper Arm, Position: Sitting, Cuff Size: Medium visit:    Vertigo    Idiopathic hypotension    Bilateral impacted cerumen    Primary osteoarthritis involving multiple joints  -     HYDROcodone-acetaminophen (NORCO) 5-325 MG per tablet; Take 1 tablet by mouth 4 times daily as needed for Pain for up to 30 days. Acquired hypothyroidism    Cerebrovascular accident (CVA), unspecified mechanism ([facility]    Stage 3 chronic kidney disease, unspecified whether stage 3a or 3b CKD ([facility]    Other orders  -     losartan (COZAAR) 25 MG tablet; Take 1 tablet by mouth daily  -     levothyroxine (SYNTHROID) 125 MCG tablet; Take 1 tablet by mouth Daily      OARRS report checked        Plan:      Hospital information reviewed with patient and daughter    Certainly recommended that losartan medication will need to be started for blood pressure management and she should continue with the metoprolol for heart rate management. Adjustment of thyroid medication to a higher dose and recheck TSH in the next 2 months. Refill of pain medication per patient request    Discussed the importance of drinking 6 a glass of fluid a day and maintain a low-salt diet. Discussed using a cane or walker for balance    RTC at normal appointment time in March    Medical decision making of moderate complexity. Please note that this chart was generated using Dragon dictation software. Although every effort was made to ensure the accuracy of this automated transcription, some errors in transcription may have occurred.

## 2022-02-01 ENCOUNTER — CARE COORDINATION (OUTPATIENT)
Dept: CASE MANAGEMENT | Age: 82
End: 2022-02-01

## 2022-02-01 NOTE — PROGRESS NOTES
Physician Progress Note      Gopal Pedraza  [facility] #:                  [id number]  :                       1940  ADMIT DATE:       2022 1:34 PM  [address] [place] DATE:        2022 6:06 PM  RESPONDING  PROVIDER #:        Benjy MORENO - CNP          QUERY TEXT:    Pt admitted with Dizziness. Pt noted to have history of Vertigo, CVA, and CT   showed Severe Stenosis of Proximal Left PCA. If possible, please document in   progress notes and discharge summary the relationship, if any, between   Dizziness and Vertigo and/or TIA. The medical record reflects the following:  Risk Factors: Age, Dizziness, Hx. CVA, Severe Stenosis Lt. PCA, taking   Eliquis, HTN, Chronic sCHF, CKD Stage 3, COPD, AFib, CAD, Hypothyroidism  Clinical Indicators: Per Neuro Consult 22 \"She felt her symptoms started   after starting Eliquis. Description is lightheadedness with no spinning   sensation. CT and CTA that showed no acute stroke or large vessel occlusion. He has a pacemaker which is not compatible with MRI. Impression: New onset dizziness and ataxia seems to be improving. Likely   peripheral vertigo or could be drug-induced from Eliquis although I feel she   is back to baseline today. \"  Per H&P 22 \"Dizziness and unilateral hearing   impairment transietn admit to r/out CVA/ TIA CT head showed stable remote   infarct in the frontal lobe. And chronic small vessel changes CT A head showed   severe stenosis of Proximal left PCA No large vessel occlusion. \"  Treatment: Nephrology/Neurology consults, CT & CTA Head, iv NS, po Eliquis,   monitor neuro status and labs  Options provided:  -- Dizziness due to, Please specify cause if, known.   -- Dizziness due to Cerebral Artery Occlusion/Stenosis (without Infarction)  -- Dizziness due to Peripheral Vertigo  -- Dizziness due to Eliquis  -- Other - I will add my own diagnosis  -- Disagree - Not applicable / Not valid  -- Disagree - Clinically unable to determine / Unknown  -- Refer to Clinical Documentation Reviewer    PROVIDER RESPONSE TEXT:    Provider is clinically unable to determine a response to this query.     Query created by: Luis Hidalgo on 1/25/2022 9:39 AM      Electronically signed by:  Rafi Mcghee CNP 2/1/2022 4:27 PM

## 2022-02-01 NOTE — CARE COORDINATION
[facility] Transitions Follow Up Call    2022    Patient: Dafne Quiroga  Patient : 1940   MRN: [id number]  Reason for Admission: Dizziness  Discharge Date: 22 RARS: Readmission Risk Score: 13.7 ( )       Attempted to reach patient via phone for care transition. VM left stating purpose of call along with my contact information requesting a return call. Care Transitions Subsequent and Final Call    Subsequent and Final Calls  Care Transitions Interventions  Other Interventions:            Follow Up  Future Appointments   Date Time Provider Triston Ford   3/1/2022  2:00 PM TIFFANIE Tatum - CNS [place] [facility]   3/4/2022  1:15 PM Edmund Carpenter MD [address]   3/24/2022  3:15 PM Pamela Elaine MD AFL NAS[facility] AFL NASO   2022  2:00 PM Fran Barrera MD [facility] PLANO Cardio MMA   2022  2:30 PM SCHEDULE, [address]  Cardio MMA       Alfredo ABRAHAM, RN, [facility]  Care Transition Nurse  [phone] mobile

## 2022-02-08 ENCOUNTER — CARE COORDINATION (OUTPATIENT)
Dept: CASE MANAGEMENT | Age: 82
End: 2022-02-08

## 2022-02-08 ENCOUNTER — OFFICE VISIT (OUTPATIENT)
Dept: FAMILY MEDICINE CLINIC | Age: 82
End: 2022-02-08
Payer: MEDICARE

## 2022-02-08 VITALS
OXYGEN SATURATION: 99 % | DIASTOLIC BLOOD PRESSURE: 84 MMHG | HEART RATE: 70 BPM | TEMPERATURE: 96.6 F | BODY MASS INDEX: 23.03 KG/M2 | WEIGHT: 130 LBS | SYSTOLIC BLOOD PRESSURE: 155 MMHG

## 2022-02-08 DIAGNOSIS — R42 VERTIGO: Primary | ICD-10-CM

## 2022-02-08 DIAGNOSIS — I10 HTN (HYPERTENSION), BENIGN: ICD-10-CM

## 2022-02-08 PROCEDURE — 4040F PNEUMOC VAC/ADMIN/RCVD: CPT | Performed by: FAMILY MEDICINE

## 2022-02-08 PROCEDURE — G8428 CUR MEDS NOT DOCUMENT: HCPCS | Performed by: FAMILY MEDICINE

## 2022-02-08 PROCEDURE — G8420 CALC BMI NORM PARAMETERS: HCPCS | Performed by: FAMILY MEDICINE

## 2022-02-08 PROCEDURE — G8484 FLU IMMUNIZE NO ADMIN: HCPCS | Performed by: FAMILY MEDICINE

## 2022-02-08 PROCEDURE — 1123F ACP DISCUSS/DSCN MKR DOCD: CPT | Performed by: FAMILY MEDICINE

## 2022-02-08 PROCEDURE — 1111F DSCHRG MED/CURRENT MED MERGE: CPT | Performed by: FAMILY MEDICINE

## 2022-02-08 PROCEDURE — 1090F PRES/ABSN URINE INCON ASSESS: CPT | Performed by: FAMILY MEDICINE

## 2022-02-08 PROCEDURE — G8399 PT W/DXA RESULTS DOCUMENT: HCPCS | Performed by: FAMILY MEDICINE

## 2022-02-08 PROCEDURE — 99213 OFFICE O/P EST LOW 20 MIN: CPT | Performed by: FAMILY MEDICINE

## 2022-02-08 PROCEDURE — 1036F TOBACCO NON-USER: CPT | Performed by: FAMILY MEDICINE

## 2022-02-08 RX ORDER — NITROGLYCERIN 400 UG/1
1 SPRAY ORAL EVERY 5 MIN PRN
Qty: 4.9 G | Refills: 1 | Status: SHIPPED | OUTPATIENT
Start: 2022-02-08

## 2022-02-08 RX ORDER — ALLOPURINOL 100 MG/1
100 TABLET ORAL DAILY
Qty: 90 TABLET | Refills: 1 | Status: SHIPPED | OUTPATIENT
Start: 2022-02-08 | End: 2022-07-13

## 2022-02-08 RX ORDER — LOSARTAN POTASSIUM 50 MG/1
50 TABLET ORAL DAILY
Qty: 90 TABLET | Refills: 1 | Status: SHIPPED | OUTPATIENT
Start: 2022-02-08 | End: 2022-06-28 | Stop reason: ALTCHOICE

## 2022-02-08 NOTE — TELEPHONE ENCOUNTER
Medication:   Requested Prescriptions     Pending Prescriptions Disp Refills    allopurinol (ZYLOPRIM) 100 MG tablet [Pharmacy Med Name: ALLOPURINOL 100 MG Tablet] 90 tablet 1     Sig: TAKE 1 TABLET EVERY DAY (ALONG WITH 300MG TABLET)      Last Filled:    Patient Phone Number: [phone] (home) [phone] (work)    Last appt: 1/26/2022   Next appt: 2/8/2022    Last OARRS:   RX Monitoring 6/1/2021   Attestation -   Periodic Controlled Substance Monitoring Possible medication side effects, risk of tolerance/dependence & alternative treatments discussed.      PDMP Monitoring:    Last PDMP Yi José as Reviewed [facility]):  Review User Review Instant Review Result   Ileana Dickerson 6/1/2021  4:31 PM Reviewed PDMP [1]     Preferred Pharmacy:   [facility], [address] [facility] [phone] [facility] [phone]  [facility]. [phone]  Phone: [phone] Fax: [phone]    [address] [facility] - [place] [phone] - F [phone]  [address] [facility] [address]  Phone: [phone] Fax: [phone]

## 2022-02-08 NOTE — PROGRESS NOTES
Subjective:      Patient ID: India Gracia is a 80 y.o. female. CC: Patient presents for acute medical problem-vertigo and hypertension. Medical assistant notes reviewed. HPI Patient presents due to dizziness to past couple of days. Patient states she feels off balance when she walks. She has felt the room spin when she is sitting down. She has been having to use the walker due to her dizziness. This is similar to what she had prior to last hospitalization. She was taken off her blood pressure medication during hospitalization. But she was started on losartan 25 mg daily which she has been compliant with. Review of Systems     Allergies   Allergen Reactions    Aspirin Nausea Only    Diltiazem Anaphylaxis    Diltiazem Hcl Anaphylaxis    Lorazepam Other (See Comments)     hallucinations  hallucinations  hallucinations    Sulfa Antibiotics Rash and Hives    Atorvastatin Other (See Comments)     Muscle pains  Muscle pains  Muscle pains    Dabigatran Nausea Only     And indigestion  And indigestion    Aka Pradaxa  And indigestion  And indigestion  And indigestion    Aka Pradaxa  And indigestion  And indigestion  And indigestion    Aka Pradaxa    Mysoline [Primidone]     Nsaids      Other reaction(s): Unknown    Other Other (See Comments)     Nitroglycerin patches causes severe headaches    Primidone      Other reaction(s): Unknown       Objective:   Physical Exam  Constitutional:       General: She is not in acute distress. HENT:      Right Ear: Tympanic membrane and ear canal normal.      Left Ear: Tympanic membrane and ear canal normal.   Neck:      Vascular: No carotid bruit. Cardiovascular:      Rate and Rhythm: Normal rate and regular rhythm. Heart sounds: No murmur heard. Musculoskeletal:      Cervical back: Neck supple. Lymphadenopathy:      Cervical: No cervical adenopathy. Neurological:      Mental Status: She is alert and oriented to person, place, and time.       Cranial Nerves: Cranial nerves are intact. Sensory: Sensation is intact. Motor: Motor function is intact. Coordination: Romberg sign positive. Coordination normal.      Gait: Tandem walk abnormal.   Psychiatric:         Behavior: Behavior is cooperative. Assessment:      Kristin Michael was seen today for dizziness. Diagnoses and all orders for this visit:    Vertigo    HTN (hypertension), benign    Other orders  -     losartan (COZAAR) 50 MG tablet; Take 1 tablet by mouth daily  -     nitroGLYCERIN (NITROLINGUAL) 0.4 MG/SPRAY 0.4 mg spray; Place 1 spray under the tongue every 5 minutes as needed for Chest pain            Plan:      Hospitalization reviewed with patient and daughter and I believe her hypertension is causing the vertigo episodes were. Patient is in agreement increase losartan to 50 mg daily and she is can start doing weekly blood pressure checks and report back her blood pressures weekly. Keep RTC appointment in 1 month    Medical decision making of low complexity      Please note that this chart was generated using Dragon dictation software. Although every effort was made to ensure the accuracy of this automated transcription, some errors in transcription may have occurred.

## 2022-02-08 NOTE — CARE COORDINATION
[facility] Transitions Follow Up Call    2022    Patient: Matias Cintron  Patient : 1940   MRN: [id number]  Reason for Admission:   Discharge Date: 22 RARS: Readmission Risk Score: 13.7 ( )         Spoke with: Charly 32 Transitions Subsequent and Final Call    Subsequent and Final Calls  Do you have any ongoing symptoms?: No  Have your medications changed?: No  Do you have any questions related to your medications?: No  Do you currently have any active services?: No  Do you have any needs or concerns that I can assist you with?: No  Identified Barriers: None  Care Transitions Interventions  No Identified Needs  Other Interventions:         Pt states doing pretty well, had some CP today that lasted a few minutes, had not experienced that in quite awhile per pt. Had nitro but it was , did not take any. Sees PCP today. Agreed to more CTC f/u calls.       Follow Up  Future Appointments   Date Time Provider Triston Ford   2022  4:15 PM Rodolfo Still MD [address]   3/1/2022  2:00 PM Tory Michel APRN - [facility] [place] [facility]   3/4/2022  1:15 PM Rodolfo Still MD [address]   3/24/2022  3:15 PM Anatoliy Corral MD [facility] AFL NASO   2022  2:00 PM Darnell Lynn MD [facility] PLANO Cardio MMA   2022  2:30 PM SCHEDULE, [address]  Cardio MMA       Jose Rodriguez RN

## 2022-02-15 ENCOUNTER — CARE COORDINATION (OUTPATIENT)
Dept: CASE MANAGEMENT | Age: 82
End: 2022-02-15

## 2022-02-15 NOTE — CARE COORDINATION
Care Transitions Outreach Attempt    Call within 2 business days of discharge: Yes   Attempted to reach patient for transitions of care follow up. Unable to reach patient. Phone was answered but just silence on the other end, then disconnects. Thi Lopes  MARCY, [facility] 30: [phone]      Patient: Lazara Messing Patient : 1940 MRN: [id number]    Last Discharge [facility]       Complaint Diagnosis Description Type Department Provider    22 Dizziness Dizziness ED to Hosp-Admission (Discharged) (ADMITTED) [facility] 3T Farshad Johnson MD; Jonathan Cabrera. .. Was this an external facility discharge?  No Discharge Facility: [facility]    Noted following upcoming appointments from discharge chart review:   [facility] follow up appointment(s):   Future Appointments   Date Time Provider Triston Ford   3/1/2022  2:00 PM TIFFANIE [place] [facility]   3/4/2022  1:15 PM Lauri Loyola MD [facility] [place] - [place]   3/24/2022  3:15 PM Renata Homans, MD AFL [facility] AFL NASO   2022  2:00 PM MD DANIELLE Hernandez Cardio MMA   2022  2:30 PM SCHEDULE, [place] DEVICE CHECK FF Cardio MMA     Non[facility] follow up appointment(s):

## 2022-02-17 ENCOUNTER — CARE COORDINATION (OUTPATIENT)
Dept: CASE MANAGEMENT | Age: 82
End: 2022-02-17

## 2022-02-17 NOTE — CARE COORDINATION
[facility] Transitions Follow Up Call    2022    Patient: Gabe Ibarra  Patient : 1940   MRN: [id number]  Reason for Admission:   Discharge Date: 22 RARS: Readmission Risk Score: 13.7 ( )         Spoke with: Charly 32 Transitions Subsequent and Final Call    Subsequent and Final Calls  Do you have any ongoing symptoms?: No  Have your medications changed?: No  Do you have any questions related to your medications?: No  Do you currently have any active services?: No  Do you have any needs or concerns that I can assist you with?: No  Identified Barriers: None  Care Transitions Interventions  No Identified Needs  Other Interventions:         Pt states doing well, no issues or concerns. F/U appts listed below. No need for further f/u CTC calls.     Follow Up  Future Appointments   Date Time Provider Triston Ford   3/1/2022  2:00 PM Tory Gama APRN - [place] [facility]   3/4/2022  1:15 PM Rasheed Dean MD [address]   3/24/2022  3:15 PM Mateus Patel MD AFL NASO AFL NASO   2022  2:00 PM Lee Ann Reyes MD [facility] PLANO Cardio MMA   2022  2:30 PM SCHEDULE, [address] [place] [address] Cardio [facility]       Liyah Ruiz RN

## 2022-02-28 RX ORDER — ERGOCALCIFEROL 1.25 MG/1
50000 CAPSULE ORAL WEEKLY
Qty: 12 CAPSULE | Refills: 1 | Status: SHIPPED | OUTPATIENT
Start: 2022-02-28 | End: 2022-08-03

## 2022-02-28 NOTE — TELEPHONE ENCOUNTER
Medication:   Requested Prescriptions     Pending Prescriptions Disp Refills    vitamin D (ERGOCALCIFEROL) 1.25 MG (91420 UT) CAPS capsule [Pharmacy Med Name: VITAMIN D 1.25 MG (47208 UT) Capsule] 12 capsule 1     Sig: TAKE 1 CAPSULE BY MOUTH ONCE A WEEK      Last Filled:      Patient Phone Number: [phone] (home) [phone] (work)    Last appt: 2/8/2022   Next appt: 3/4/2022    Last OARRS:   RX Monitoring 6/1/2021   Attestation -   Periodic Controlled Substance Monitoring Possible medication side effects, risk of tolerance/dependence & alternative treatments discussed.      PDMP Monitoring:    Last PDMP Ricarda Samples as Reviewed [facility]):  Review User Review Instant Review Result   Luis Archer 6/1/2021  4:31 PM Reviewed PDMP [1]         [address] [facility] - Suzie Gustavonayan [phone] - F [phone]  [address] [place] [id number]  Phone: [phone] Fax: [phone]

## 2022-03-01 ENCOUNTER — OFFICE VISIT (OUTPATIENT)
Dept: CARDIOLOGY CLINIC | Age: 82
End: 2022-03-01
Payer: MEDICARE

## 2022-03-01 VITALS
SYSTOLIC BLOOD PRESSURE: 120 MMHG | HEART RATE: 68 BPM | WEIGHT: 128 LBS | BODY MASS INDEX: 22.68 KG/M2 | DIASTOLIC BLOOD PRESSURE: 60 MMHG | HEIGHT: 63 IN | OXYGEN SATURATION: 98 %

## 2022-03-01 DIAGNOSIS — I50.22 CHRONIC SYSTOLIC CONGESTIVE HEART FAILURE (HCC): ICD-10-CM

## 2022-03-01 DIAGNOSIS — I50.22 CHRONIC SYSTOLIC HEART FAILURE (HCC): ICD-10-CM

## 2022-03-01 DIAGNOSIS — I50.22 CHRONIC SYSTOLIC CONGESTIVE HEART FAILURE (HCC): Primary | ICD-10-CM

## 2022-03-01 DIAGNOSIS — I25.810 CORONARY ARTERY DISEASE INVOLVING AUTOLOGOUS ARTERY CORONARY BYPASS GRAFT WITHOUT ANGINA PECTORIS: ICD-10-CM

## 2022-03-01 DIAGNOSIS — N28.9 RENAL INSUFFICIENCY: ICD-10-CM

## 2022-03-01 DIAGNOSIS — I48.20 CHRONIC ATRIAL FIBRILLATION (HCC): ICD-10-CM

## 2022-03-01 PROCEDURE — 1123F ACP DISCUSS/DSCN MKR DOCD: CPT | Performed by: CLINICAL NURSE SPECIALIST

## 2022-03-01 PROCEDURE — G8420 CALC BMI NORM PARAMETERS: HCPCS | Performed by: CLINICAL NURSE SPECIALIST

## 2022-03-01 PROCEDURE — 99214 OFFICE O/P EST MOD 30 MIN: CPT | Performed by: CLINICAL NURSE SPECIALIST

## 2022-03-01 PROCEDURE — G8427 DOCREV CUR MEDS BY ELIG CLIN: HCPCS | Performed by: CLINICAL NURSE SPECIALIST

## 2022-03-01 PROCEDURE — 4040F PNEUMOC VAC/ADMIN/RCVD: CPT | Performed by: CLINICAL NURSE SPECIALIST

## 2022-03-01 PROCEDURE — 1036F TOBACCO NON-USER: CPT | Performed by: CLINICAL NURSE SPECIALIST

## 2022-03-01 PROCEDURE — 1090F PRES/ABSN URINE INCON ASSESS: CPT | Performed by: CLINICAL NURSE SPECIALIST

## 2022-03-01 PROCEDURE — G8484 FLU IMMUNIZE NO ADMIN: HCPCS | Performed by: CLINICAL NURSE SPECIALIST

## 2022-03-01 PROCEDURE — G8399 PT W/DXA RESULTS DOCUMENT: HCPCS | Performed by: CLINICAL NURSE SPECIALIST

## 2022-03-01 NOTE — PROGRESS NOTES
[address]  Progress Note    Primary Care Doctor:  Verena Fortune MD    Chief Complaint   Patient presents with    Follow-up    Congestive Heart Failure        History of Present Illness:  80 y.o. female with history of Ms. Jesi Nava She has a history of atrial fibrillation (on xarelto), TIA, chronic kidney disease, CHF, hypertension, hyperlipidemia, mitral valve disease, hypothyroidism. Mp Tobin last LVEF was 45% on 7/19/19. CABG, 7/08 cath- patent LIMA to LAD, SVG occluded to RCA; 8/2019 left to right fem-fem bypass and left fem to above knee popliteal bypass  Pacemaker generator change 1/25/21  Watchman implanted 5/17/2021    I had the pleasure of seeing India Gracia in follow up for systolic heart failure. She is ambulatory and with her daughter. In January, she had DONA/CV but cancelled due to thrombus on watchman. She feels good but tells me she had pain down both arms about 1-2 weeks ago, took a NTG and pain went away. It only lasted 5-6 seconds. Her weight is stable. She denies any further chest pain, palpitations, lightheadedness or edema. Past Medical History:   has a past medical history of Allergic rhinitis, cause unspecified, Arthritis, Atrial fibrillation ([address] Bronchopneumonia, CAD (coronary artery disease), Cerebral artery occlusion with cerebral infarction ([address] Chronic gouty arthropathy, Chronic kidney disease, Congestive heart failure, unspecified, Degeneration of cervical intervertebral disc, Essential and other specified forms of tremor, Gout, HIGH CHOLESTEROL, History of CVA (cerebrovascular accident), Hx of blood clots, Hypertension, Influenza, Mitral valve stenosis and aortic valve insufficiency, Movement disorder, Pacemaker, Peptic ulcer, unspecified site, unspecified as acute or chronic, without mention of hemorrhage, perforation, or obstruction, Thyroid disease, Unspecified disorder of kidney and ureter, and Unspecified hypothyroidism.   Surgical History:   has a past surgical history that includes back surgery; Cholecystectomy; Cardiac surgery; Coronary artery bypass graft (1987); shoulder surgery; Cardiac catheterization (7/2012); hip surgery (Left, 03/16/2017); joint replacement; Cardiac catheterization (08/07/2018); Percutaneous Transluminal Coronary Angio (11/04/2014); pr njx aa&/strd tfrml epi lumbar/sacral 1 level (Right, 12/3/2018); Femoral-femoral Bypass Graft (N/A, 8/22/2019); femoral bypass (Left, 8/22/2019); and IR KYPHOPLASTY LUMBAR 1 VERTEBRAL BODY (5/14/2021). Social History:   reports that she quit smoking about 35 years ago. Her smoking use included cigarettes. She has a 28.00 pack-year smoking history. She has never used smokeless tobacco. She reports previous alcohol use. She reports that she does not use drugs. Family History:   Family History   Problem Relation Age of Onset    Cancer Sister     Heart Disease Sister     Diabetes Brother     Hypertension Brother     Heart Disease Brother     Stroke Maternal Aunt     Heart Disease Maternal Aunt     Diabetes Maternal Uncle     Hypertension Paternal Aunt     Cancer Maternal Grandmother     Cancer Paternal Grandmother     Rheum Arthritis Neg Hx     Lupus Neg Hx        Home Medications:  Prior to Admission medications    Medication Sig Start Date End Date Taking?  Authorizing Provider   vitamin D (ERGOCALCIFEROL) 1.25 MG (46101 UT) CAPS capsule TAKE 1 CAPSULE BY MOUTH ONCE A WEEK 2/28/22  Yes Gavino Stuart MD   allopurinol (ZYLOPRIM) 100 MG tablet Take 1 tablet by mouth daily 2/8/22  Yes Gavino Stuart MD   losartan (COZAAR) 50 MG tablet Take 1 tablet by mouth daily 2/8/22  Yes Gavino Stuart MD   nitroGLYCERIN (NITROLINGUAL) 0.4 MG/SPRAY 0.4 mg spray Place 1 spray under the tongue every 5 minutes as needed for Chest pain 2/8/22  Yes Gavino Stuart MD   levothyroxine (SYNTHROID) 125 MCG tablet Take 1 tablet by mouth Daily 1/26/22  Yes Gavino Stuart MD   fluticasone (FLONASE) 50 MCG/ACT nasal spray 2 sprays by Each Nostril route daily 1/25/22  Yes TIFFANIE Samuel CNP   apixaban (ELIQUIS) 2.5 MG TABS tablet Take 1 tablet by mouth 2 times daily 1/12/22  Yes Cliff Cota MD   metoprolol succinate (TOPROL XL) 25 MG extended release tablet Take 0.5 tablets by mouth daily . 5 tablet daily 12/6/21  Yes TIFFANIE Cunningham CNP   topiramate (TOPAMAX) 50 MG tablet TAKE 2 TABLETS TWICE DAILY 11/29/21  Yes Otto Lowry MD   acetaminophen (TYLENOL) 325 MG tablet Take 1,000 mg by mouth daily  4/5/21  Yes Abby Newton MD   betamethasone valerate (VALISONE) 0.1 % cream Apply topically 2 times daily. 6/29/21  Yes Otto Lowry MD   torsemide (DEMADEX) 10 MG tablet TAKE 1 TABLET EVERY OTHER DAY ALTERNATING WITH  2  TABLETS EVERY OTHER DAY  Patient taking differently: Take 20 mg by mouth daily TAKE 1 TABLET EVERY OTHER DAY ALTERNATING WITH  2  TABLETS EVERY OTHER DAY    PATIENT TAKING 20 MG DAILY FOR 5 DAYS THEN RETURN TO ALTERNATE 5/25/21  Yes Otto Lowry MD        Allergies:  Aspirin, Diltiazem, Diltiazem hcl, Lorazepam, Sulfa antibiotics, Atorvastatin, Dabigatran, Mysoline [primidone], Nsaids, Other, and Primidone     Review of Systems:   · Constitutional: there has been no unanticipated weight loss. There's been no change in energy level, sleep pattern, or activity level. · Eyes: No visual changes or diplopia. No scleral icterus. · ENT: No Headaches, hearing loss or vertigo. No mouth sores or sore throat. · Cardiovascular: Reviewed in HPI  · Respiratory: No cough or wheezing, no sputum production. No hematemesis. · Gastrointestinal: No abdominal pain, appetite loss, blood in stools. No change in bowel or bladder habits. States she feels bloated   · Genitourinary: No dysuria, trouble voiding, or hematuria. · Musculoskeletal:  No gait disturbance, weakness or joint complaints. · Integumentary: No rash or pruritis.   · Neurological: No headache, diplopia, change in muscle strength, numbness or tingling. No change in gait, balance, coordination, mood, affect, memory, mentation, behavior. · Psychiatric: No anxiety, no depression. · Endocrine: No malaise, fatigue or temperature intolerance. No excessive thirst, fluid intake, or urination. No tremor. · Hematologic/Lymphatic: No abnormal bruising or bleeding, blood clots or swollen lymph nodes. · Allergic/Immunologic: No nasal congestion or hives. Physical Examination:    Vitals:    03/01/22 1349   BP: 120/60   Site: Right Upper Arm   Position: Sitting   Cuff Size: Medium Adult   Pulse: 68   SpO2: 98%   Weight: 128 lb (58.1 kg)   Height: 5' 3\" (1.6 m)        Constitutional and General Appearance: Warm and dry, no apparent distress, normal coloration  HEENT:  Normocephalic, atraumatic  Respiratory:  · Normal excursion and expansion without use of accessory muscles  · Resp Auscultation: Normal breath sounds without dullness  Cardiovascular:  · The apical impulses not displaced  · Heart tones are crisp and normal  · JVP normal H2O  · regular rhythm  · Peripheral pulses are symmetrical and full  · There is no clubbing, cyanosis of the extremities.   · No ankle edema  · Pedal Pulses: 2+ and equal   Abdomen:  · No masses or tenderness  · Liver/Spleen: No Abnormalities Noted  Neurological/Psychiatric:  · Alert and oriented in all spheres  · Moves all extremities well  · Exhibits normal gait balance and coordination  · No abnormalities of mood, affect, memory, mentation, or behavior are noted    Lab Data:    CBC:   Lab Results   Component Value Date    WBC 7.5 01/24/2022    WBC 7.1 01/23/2022    WBC 7.8 01/22/2022    RBC 5.09 01/24/2022    RBC 5.07 01/23/2022    RBC 5.13 01/22/2022    HGB 13.5 01/24/2022    HGB 13.5 01/23/2022    HGB 13.4 01/22/2022    HCT 42.2 01/24/2022    HCT 42.5 01/23/2022    HCT 42.8 01/22/2022    MCV 82.9 01/24/2022    MCV 83.7 01/23/2022    MCV 83.4 01/22/2022    RDW 21.0 01/24/2022    RDW 21.3 01/23/2022    RDW 21.8 01/22/2022     01/24/2022     01/23/2022     01/22/2022     BMP:  Lab Results   Component Value Date     01/24/2022     01/23/2022     01/22/2022    K 4.0 01/24/2022    K 3.7 01/23/2022    K 4.5 01/22/2022    K 4.2 01/05/2022    K 4.9 12/16/2020    K 4.2 11/07/2020     01/24/2022     01/23/2022     01/22/2022    CO2 20 01/24/2022    CO2 20 01/23/2022    CO2 23 01/22/2022    PHOS 4.1 12/20/2021    PHOS 3.2 10/20/2021    PHOS 4.0 09/01/2021    BUN 24 01/24/2022    BUN 30 01/23/2022    BUN 32 01/22/2022    CREATININE 1.3 01/24/2022    CREATININE 1.3 01/23/2022    CREATININE 1.3 01/22/2022     BNP:   Lab Results   Component Value Date    PROBNP 4,196 01/22/2022    PROBNP 3,042 01/05/2022    PROBNP 6,300 08/16/2021     Cardiac Imaging:  Echo 1/12/2022  Summary   Normal left ventricle size, wall thickness, and systolic function with an   estimated ejection fraction of 55-60%. Mitral valve leaflets appear moderately thickened. The posterior leaflet   appears severely restricted. Moderate mitral regurgitation is present. Mitral annular calcification. Left atrial size is dilated. There is a Watchman device seated with a   feliciano-device leak measured at 2 mm on the anterior side. There is a small an echogenic mass on the surface of the device, consistent   with thrombus near the coumadin ridge. DONA 7/7/2021  Summary   Ejection fraction is visually estimated to be 45-50 %. Mild thickening of anterior, posterior leaflet of mitral valve. There is decreased leaflet motion and \"hockey stick\" appearance of the   mitral leaflets. posterior leaflet is immobile. Mean Gradient 4 mmHg. Moderate mitral regurgitation is present. There is no evidence of mass or thrombus in the left atrium or appendage. Watchman in place. No thrombus. <3 mm leak. The left atrium is dilated. A PFO is present.    Aortic valve leaflets appear thickened. Tricuspid aortic valve. Mild aortic regurgitation is present. Plaque is noted in the thoracic aorta. Moderate tricuspid regurgitation. PASP 51 mmHg    [facility] 3/19/2021 Dr Jenni Castro  Artery Findings/Result   LM Normal   LAD 50% ostial, 50% mid instent   Cx 50% mid at OM1 bifurcation, 30% OM1 mid at bifurcation   RI NA   % prox with multiple R-R collaterals   LVEDP 8   LVG NA due to renal failure      Intervention:         None     Post Cath Dx:       Stable CAD  Possible angina from  of RCA - poor candidate for  intervention with renal failure    Echo 4/30/2020   Left ventricular cavity size is normal. Normal left ventricular wall   thickness. Left ventricular function appears to be reduced with an estimated   ejection fraction of 45%. Diffuse hypokinesis. Echo 7/19/2019   Summary    Left ventricle - normal size, thickness, reduced function with EF of 45%  LV wall motion - diffuse hypokinesis. Mitral valve - thickened, annular calcification, moderate regurgitation  Aortic valve - thickened, calcified, mild regurgitation  Tricuspid valve - mild regurgitation with PASP of 25mmHg  Pulmonic valve - trivial regurgitation   Biatrial enlargement  Pacemaker / ICD lead is visualized in the right heart. 6/2019 Dr Jenni Castro  NSTEMI: . Angiogram findings were as below:      Findings:                      LM       Normal              LAD     50% ostial, mid 100%              Cx        40% OM1 at bifurcation              RCA     Mid 100%              LVG     Not performed              EDP     15 mmHg              L-LAD  Patent              S-PDA Known occluded  Severe Ca++:None  Post Cath Dx:Stable CAD, no apparent culprit for NSTEMI  Intervention:  None  Med Rec:        Continue aggressive med tx                          Continue plavix, no asa due to allergy. statin added. LDL 75   8/7/18  The PCI of complex proximal RCA chronic total occlusion was unsuccessful (J- score 3).   Underwent successful with Dr Aguila Mobley:     1. Continue current medications  2. Will check with Dr Jeremy Ruiz regarding angiogram  3. Check blood work  4. RTO in 4-5 months    Message sent to Dr Jeremy Ruiz regarding pain down her arms and recent thrombus on watchman to see if [facility] recommended. I appreciate the opportunity of cooperating in the care of this individual.    Michael Rowe, TIFFANIE - CNS, CNS, 3/1/2022, 1:59 PM    QUALITY MEASURES  1. Tobacco Cessation Counseling: NA  2. Retake of BP if >140/90:   NA  3. Documentation to PCP/referring for new patient:  Sent to PCP at close of office visit  4. CAD patient on anti-platelet: Yes  5. CAD patient on STATIN therapy:  No did not tolerate  6.  Patient with CHF and aFib on anticoagulation:  Yes

## 2022-03-01 NOTE — PATIENT INSTRUCTIONS
1.  Continue current medications  2. Will check with Dr Adeline Cheadle regarding angiogram  3. Check blood work  4.   RTO in 4-5 months

## 2022-03-04 ENCOUNTER — OFFICE VISIT (OUTPATIENT)
Dept: FAMILY MEDICINE CLINIC | Age: 82
End: 2022-03-04
Payer: MEDICARE

## 2022-03-04 VITALS
OXYGEN SATURATION: 96 % | WEIGHT: 125.5 LBS | TEMPERATURE: 97.2 F | RESPIRATION RATE: 12 BRPM | BODY MASS INDEX: 22.23 KG/M2 | HEART RATE: 71 BPM | DIASTOLIC BLOOD PRESSURE: 70 MMHG | SYSTOLIC BLOOD PRESSURE: 118 MMHG

## 2022-03-04 DIAGNOSIS — M54.16 ACUTE LUMBAR RADICULOPATHY: Primary | ICD-10-CM

## 2022-03-04 DIAGNOSIS — M15.9 PRIMARY OSTEOARTHRITIS INVOLVING MULTIPLE JOINTS: ICD-10-CM

## 2022-03-04 DIAGNOSIS — I73.9 PAD (PERIPHERAL ARTERY DISEASE) (HCC): ICD-10-CM

## 2022-03-04 DIAGNOSIS — G60.9 IDIOPATHIC PERIPHERAL NEUROPATHY: ICD-10-CM

## 2022-03-04 PROCEDURE — 1123F ACP DISCUSS/DSCN MKR DOCD: CPT | Performed by: FAMILY MEDICINE

## 2022-03-04 PROCEDURE — 1090F PRES/ABSN URINE INCON ASSESS: CPT | Performed by: FAMILY MEDICINE

## 2022-03-04 PROCEDURE — G8399 PT W/DXA RESULTS DOCUMENT: HCPCS | Performed by: FAMILY MEDICINE

## 2022-03-04 PROCEDURE — G8420 CALC BMI NORM PARAMETERS: HCPCS | Performed by: FAMILY MEDICINE

## 2022-03-04 PROCEDURE — 1036F TOBACCO NON-USER: CPT | Performed by: FAMILY MEDICINE

## 2022-03-04 PROCEDURE — 99214 OFFICE O/P EST MOD 30 MIN: CPT | Performed by: FAMILY MEDICINE

## 2022-03-04 PROCEDURE — 4040F PNEUMOC VAC/ADMIN/RCVD: CPT | Performed by: FAMILY MEDICINE

## 2022-03-04 PROCEDURE — G8427 DOCREV CUR MEDS BY ELIG CLIN: HCPCS | Performed by: FAMILY MEDICINE

## 2022-03-04 PROCEDURE — G8484 FLU IMMUNIZE NO ADMIN: HCPCS | Performed by: FAMILY MEDICINE

## 2022-03-04 RX ORDER — HYDROCODONE BITARTRATE AND ACETAMINOPHEN 5; 325 MG/1; MG/1
1 TABLET ORAL 4 TIMES DAILY PRN
Qty: 120 TABLET | Refills: 0 | Status: SHIPPED | OUTPATIENT
Start: 2022-03-04 | End: 2022-04-21 | Stop reason: SDUPTHER

## 2022-03-04 RX ORDER — PREDNISONE 10 MG/1
TABLET ORAL
Qty: 20 TABLET | Refills: 0 | Status: SHIPPED | OUTPATIENT
Start: 2022-03-04 | End: 2022-03-22 | Stop reason: ALTCHOICE

## 2022-03-04 RX ORDER — DULOXETIN HYDROCHLORIDE 30 MG/1
30 CAPSULE, DELAYED RELEASE ORAL DAILY
Qty: 30 CAPSULE | Refills: 5 | Status: SHIPPED | OUTPATIENT
Start: 2022-03-04 | End: 2022-06-28

## 2022-03-04 NOTE — PROGRESS NOTES
Subjective:      Patient ID: Lico Fontenot is a 80 y.o. female. CC: Patient presents for re-evaluation of chronic health problems including severe low back pain with radiation to the right hip and leg. Ken DUNCAN Pt is here with her daughter for a follow up and will like to discuss right hip pain. Pt states that she has seen Dr. Yecenia Almonte and had shot but it didn't help at all. Pt states she is pain all the time can barely walk. She typically taken the pain medicine more at nighttime so she can sleep. She has pain pretty much all the time but much worse with walking. Daughter also feels she started have some depression related to the discomfort. Patient does have PAD but denies any claudication symptoms    Review of Dr. Yecenia Almonte notes include injection of the right hip bursa and medial branch blocks at the right L3, L4 and L5 nerve roots which according to the patient did not improve symptoms at all. She was treated with a Medrol Dosepak with some improvement of her symptoms in early December. CT scan of the spine demonstrates multilevel spondylolisthesis.     Review of Systems  Patient Active Problem List   Diagnosis    Mixed hyperlipidemia    Chronic gouty arthropathy    Acquired hypothyroidism    Non-rheumatic mitral regurgitation    COPD (chronic obstructive pulmonary disease) (HCC)    Restrictive lung disease    Sick sinus syndrome ([facility])    Allergic rhinitis    Fibrocystic breast    Cerebrovascular accident (CVA) ([address]    HTN (hypertension), benign    Pacemaker    PAT (paroxysmal atrial tachycardia) ([facility])    Primary osteoarthritis involving multiple joints    Vitamin D deficiency    Tremor    Chronic total occlusion of native coronary artery    Age related osteoporosis    Chronic systolic heart failure (HCC)    Stage 3 chronic kidney disease ([address]    PAD (peripheral artery disease) ([address]    Atherosclerosis of native arteries of extremities with intermittent claudication, bilateral legs ([address]    Idiopathic peripheral neuropathy    Ischemic cardiomyopathy    Dizziness    Peripheral vertigo, bilateral    [facility]oxysmal atrial fibrillation) ([facility])    Dyslipidemia    Iron deficiency anemia    Anemia    Bilateral sensorineural hearing loss    Memory loss due to medical condition    Symptomatic bradycardia    Pacemaker lead failure    Presence of Watchman left atrial appendage closure device       Outpatient Medications Marked as Taking for the 3/4/22 encounter (Office Visit) with Amada Jameson MD   Medication Sig Dispense Refill    vitamin D (ERGOCALCIFEROL) 1.25 MG (23021 UT) CAPS capsule TAKE 1 CAPSULE BY MOUTH ONCE A WEEK 12 capsule 1    allopurinol (ZYLOPRIM) 100 MG tablet Take 1 tablet by mouth daily 90 tablet 1    losartan (COZAAR) 50 MG tablet Take 1 tablet by mouth daily 90 tablet 1    nitroGLYCERIN (NITROLINGUAL) 0.4 MG/SPRAY 0.4 mg spray Place 1 spray under the tongue every 5 minutes as needed for Chest pain 4.9 g 1    levothyroxine (SYNTHROID) 125 MCG tablet Take 1 tablet by mouth Daily 90 tablet 1    fluticasone (FLONASE) 50 MCG/ACT nasal spray 2 sprays by Each Nostril route daily 16 g 0    apixaban (ELIQUIS) 2.5 MG TABS tablet Take 1 tablet by mouth 2 times daily 180 tablet 1    metoprolol succinate (TOPROL XL) 25 MG extended release tablet Take 0.5 tablets by mouth daily . 5 tablet daily 45 tablet 0    topiramate (TOPAMAX) 50 MG tablet TAKE 2 TABLETS TWICE DAILY 360 tablet 1    acetaminophen (TYLENOL) 325 MG tablet Take 1,000 mg by mouth daily       betamethasone valerate (VALISONE) 0.1 % cream Apply topically 2 times daily.  30 g 5    torsemide (DEMADEX) 10 MG tablet TAKE 1 TABLET EVERY OTHER DAY ALTERNATING WITH  2  TABLETS EVERY OTHER DAY (Patient taking differently: Take 20 mg by mouth daily TAKE 1 TABLET EVERY OTHER DAY ALTERNATING WITH  2  TABLETS EVERY OTHER DAY    PATIENT TAKING 20 MG DAILY FOR 5 DAYS THEN RETURN TO ALTERNATE) 135 tablet 1 Allergies   Allergen Reactions    Aspirin Nausea Only    Diltiazem Anaphylaxis    Diltiazem Hcl Anaphylaxis    Lorazepam Other (See Comments)     hallucinations  hallucinations  hallucinations    Sulfa Antibiotics Rash and Hives    Atorvastatin Other (See Comments)     Muscle pains  Muscle pains  Muscle pains    Dabigatran Nausea Only     And indigestion  And indigestion    Aka Pradaxa  And indigestion  And indigestion  And indigestion    Aka Pradaxa  And indigestion  And indigestion  And indigestion    Aka Pradaxa    Mysoline [Primidone]     Nsaids      Other reaction(s): Unknown    Other Other (See Comments)     Nitroglycerin patches causes severe headaches    Primidone      Other reaction(s): Unknown       Social History     Tobacco Use    Smoking status: Former Smoker     Packs/day: 1.00     Years: 28.00     Pack years: 28.00     Types: Cigarettes     Quit date: 1987     Years since quittin.1    Smokeless tobacco: Never Used    Tobacco comment: H.O.smoking at age 15 / smoked up to 1 p.p.d / quit    Substance Use Topics    Alcohol use: Not Currently     Alcohol/week: 0.0 standard drinks       /70 (Site: Left Upper Arm, Position: Sitting, Cuff Size: Medium Adult)   Pulse 71   Temp 97.2 °F (36.2 °C) (Infrared)   Resp 12   Wt 125 lb 8 oz (56.9 kg)   SpO2 96%   BMI 22.23 kg/m²     Objective:   Physical Exam  Constitutional:       General: She is not in acute distress. Appearance: She is well-developed. Musculoskeletal:      Lumbar back: Spasms (Lower right paraspinals) and tenderness (Sacrum and sacral brim) present. No swelling, edema or deformity. Decreased range of motion. Negative right straight leg raise test and negative left straight leg raise test.      Right hip: No tenderness. Normal range of motion. Left hip: No tenderness. Normal range of motion. Right upper leg: Normal. No swelling, deformity or tenderness.       Left upper leg: Normal. No swelling, deformity or tenderness. Skin:     General: Skin is warm and dry. Findings: No rash. Neurological:      Mental Status: She is alert and oriented to person, place, and time. Sensory: Sensation is intact. Motor: Motor function is intact. No weakness or atrophy. Gait: Gait abnormal.      Deep Tendon Reflexes:      Reflex Scores:       Patellar reflexes are 2+ on the right side and 2+ on the left side. Achilles reflexes are 2+ on the right side and 2+ on the left side. Psychiatric:         Behavior: Behavior is cooperative. Assessment:      Miranda De La Cruz was seen today for follow-up, hyperlipidemia and hypertension. Diagnoses and all orders for this visit:    Acute lumbar radiculopathy    Primary osteoarthritis involving multiple joints  -     HYDROcodone-acetaminophen (NORCO) 5-325 MG per tablet; Take 1 tablet by mouth 4 times daily as needed for Pain for up to 30 days. PAD (peripheral artery disease) (HCC)    Idiopathic peripheral neuropathy    Other orders  -     DULoxetine (CYMBALTA) 30 MG extended release capsule; Take 1 capsule by mouth daily  -     predniSONE (DELTASONE) 10 MG tablet; 1 TID for 4 day then 1 BID      OARRS report checked        Plan:      Specialty notes reviewed with patient and daughter along with CT scan    Begin a prednisone burst and duloxetine    Patient is in agreement to begin a trial of physical therapy with dry needling    Continue using pain medication on a as needed basis    RTC 1 month    Medical decision making of moderate complexity. Please note that this chart was generated using Dragon dictation software. Although every effort was made to ensure the accuracy of this automated transcription, some errors in transcription may have occurred.

## 2022-03-07 ENCOUNTER — TELEPHONE (OUTPATIENT)
Dept: FAMILY MEDICINE CLINIC | Age: 82
End: 2022-03-07

## 2022-03-07 NOTE — TELEPHONE ENCOUNTER
Patient is calling with questions about the physical therapy orders that were placed. She states she is a little confused.      Please advise

## 2022-03-07 NOTE — TELEPHONE ENCOUNTER
Patient advised that she should have the order for the physical therapy place to go to wherever you would like.

## 2022-03-10 ENCOUNTER — TELEPHONE (OUTPATIENT)
Dept: FAMILY MEDICINE CLINIC | Age: 82
End: 2022-03-10

## 2022-03-10 NOTE — TELEPHONE ENCOUNTER
Patient calling in with blurred vision and headache. States she started 3 new medications last week on 3/4 from visit with Dr. Daina Magaña. Believes it could be the side effects from those medications and is a little concerned about it. States no other symptoms have developed. Call back number is daughter, Claudia Garza, [phone].

## 2022-03-17 ENCOUNTER — TELEPHONE (OUTPATIENT)
Dept: FAMILY MEDICINE CLINIC | Age: 82
End: 2022-03-17

## 2022-03-17 DIAGNOSIS — N18.32 STAGE 3B CHRONIC KIDNEY DISEASE (HCC): ICD-10-CM

## 2022-03-17 LAB
ALBUMIN SERPL-MCNC: 4.2 G/DL (ref 3.4–5)
ANION GAP SERPL CALCULATED.3IONS-SCNC: 17 MMOL/L (ref 3–16)
BUN BLDV-MCNC: 63 MG/DL (ref 7–20)
CALCIUM SERPL-MCNC: 8.9 MG/DL (ref 8.3–10.6)
CHLORIDE BLD-SCNC: 108 MMOL/L (ref 99–110)
CO2: 21 MMOL/L (ref 21–32)
CREAT SERPL-MCNC: 2 MG/DL (ref 0.6–1.2)
CREATININE URINE: 137.7 MG/DL (ref 28–259)
GFR AFRICAN AMERICAN: 29
GFR NON-AFRICAN AMERICAN: 24
GLUCOSE BLD-MCNC: 86 MG/DL (ref 70–99)
HCT VFR BLD CALC: 47.4 % (ref 36–48)
HEMOGLOBIN: 14.5 G/DL (ref 12–16)
MCH RBC QN AUTO: 26.6 PG (ref 26–34)
MCHC RBC AUTO-ENTMCNC: 30.7 G/DL (ref 31–36)
MCV RBC AUTO: 86.8 FL (ref 80–100)
PDW BLD-RTO: 22.3 % (ref 12.4–15.4)
PHOSPHORUS: 4.4 MG/DL (ref 2.5–4.9)
PLATELET # BLD: 262 K/UL (ref 135–450)
PMV BLD AUTO: 9.2 FL (ref 5–10.5)
POTASSIUM SERPL-SCNC: 4.5 MMOL/L (ref 3.5–5.1)
PROTEIN PROTEIN: 15 MG/DL
PROTEIN/CREAT RATIO: 0.1 MG/DL
RBC # BLD: 5.46 M/UL (ref 4–5.2)
SODIUM BLD-SCNC: 146 MMOL/L (ref 136–145)
WBC # BLD: 9.7 K/UL (ref 4–11)

## 2022-03-17 RX ORDER — CIPROFLOXACIN 250 MG/1
250 TABLET, FILM COATED ORAL 2 TIMES DAILY
Qty: 20 TABLET | Refills: 0 | Status: SHIPPED | OUTPATIENT
Start: 2022-03-17 | End: 2022-03-27

## 2022-03-17 NOTE — TELEPHONE ENCOUNTER
Patient went to the hospital for a UTI on 03/14 and the antibiotic that they gave her caused her to have diarrhea so she is wanting to know if something else could be sent in for her.      [facility] in chart     Please advise     Provider out of the office

## 2022-03-21 ENCOUNTER — OFFICE VISIT (OUTPATIENT)
Dept: FAMILY MEDICINE CLINIC | Age: 82
End: 2022-03-21
Payer: MEDICARE

## 2022-03-21 VITALS
HEART RATE: 55 BPM | BODY MASS INDEX: 22.85 KG/M2 | DIASTOLIC BLOOD PRESSURE: 70 MMHG | WEIGHT: 129 LBS | OXYGEN SATURATION: 99 % | TEMPERATURE: 97.3 F | SYSTOLIC BLOOD PRESSURE: 134 MMHG

## 2022-03-21 DIAGNOSIS — R42 VERTIGO: ICD-10-CM

## 2022-03-21 DIAGNOSIS — N30.01 ACUTE CYSTITIS WITH HEMATURIA: Primary | ICD-10-CM

## 2022-03-21 DIAGNOSIS — N17.9 ACUTE RENAL FAILURE, UNSPECIFIED ACUTE RENAL FAILURE TYPE (HCC): ICD-10-CM

## 2022-03-21 PROCEDURE — G8484 FLU IMMUNIZE NO ADMIN: HCPCS | Performed by: FAMILY MEDICINE

## 2022-03-21 PROCEDURE — 1036F TOBACCO NON-USER: CPT | Performed by: FAMILY MEDICINE

## 2022-03-21 PROCEDURE — 1090F PRES/ABSN URINE INCON ASSESS: CPT | Performed by: FAMILY MEDICINE

## 2022-03-21 PROCEDURE — G8428 CUR MEDS NOT DOCUMENT: HCPCS | Performed by: FAMILY MEDICINE

## 2022-03-21 PROCEDURE — 1123F ACP DISCUSS/DSCN MKR DOCD: CPT | Performed by: FAMILY MEDICINE

## 2022-03-21 PROCEDURE — 4040F PNEUMOC VAC/ADMIN/RCVD: CPT | Performed by: FAMILY MEDICINE

## 2022-03-21 PROCEDURE — G8420 CALC BMI NORM PARAMETERS: HCPCS | Performed by: FAMILY MEDICINE

## 2022-03-21 PROCEDURE — G8399 PT W/DXA RESULTS DOCUMENT: HCPCS | Performed by: FAMILY MEDICINE

## 2022-03-21 PROCEDURE — 99214 OFFICE O/P EST MOD 30 MIN: CPT | Performed by: FAMILY MEDICINE

## 2022-03-21 NOTE — PROGRESS NOTES
Subjective:      Patient ID: Erick Tucker is a 80 y.o. female. CC: Patient presents for hospital follow-up. HPI Patient presents for a follow-up from [facility] ED  on 3/14/2022 for dizziness in accompaniment of daughter. . Patient has still been having dizzy spells. Patient had x-rays and laboratory testing done during hospital stay. The creatinine increased up to 2.0 and she was found to have UTI with Klebsiella bacteria. She was sent home on oral antibiotics and she has a follow-up appointment set up with nephrology.   Patient was taken off torsemide as she left hospital.    Review of Systems     Patient Active Problem List   Diagnosis    Mixed hyperlipidemia    Chronic gouty arthropathy    Acquired hypothyroidism    Non-rheumatic mitral regurgitation    COPD (chronic obstructive pulmonary disease) ([facility])    Restrictive lung disease    Sick sinus syndrome ([address]    Allergic rhinitis    Fibrocystic breast    Cerebrovascular accident (CVA) ([address]    HTN (hypertension), benign    Pacemaker    PAT (paroxysmal atrial tachycardia) ([facility])    Primary osteoarthritis involving multiple joints    Vitamin D deficiency    Tremor    Chronic total occlusion of native coronary artery    Age related osteoporosis    Chronic systolic heart failure ([facility])    Stage 3 chronic kidney disease ([address]    PAD (peripheral artery disease) ([address]    Atherosclerosis of native arteries of extremities with intermittent claudication, bilateral legs (HCC)    Idiopathic peripheral neuropathy    Ischemic cardiomyopathy    Dizziness    Peripheral vertigo, bilateral    PAF (paroxysmal atrial fibrillation) ([facility])    Dyslipidemia    Iron deficiency anemia    Anemia    Bilateral sensorineural hearing loss    Memory loss due to medical condition    Symptomatic bradycardia    Pacemaker lead failure    Presence of Watchman left atrial appendage closure device       Outpatient Medications Marked as Taking for the 3/21/22 encounter (Office Visit) with Rasheed Dean MD   Medication Sig Dispense Refill    ciprofloxacin (CIPRO) 250 MG tablet Take 1 tablet by mouth 2 times daily for 10 days 20 tablet 0    DULoxetine (CYMBALTA) 30 MG extended release capsule Take 1 capsule by mouth daily 30 capsule 5    vitamin D (ERGOCALCIFEROL) 1.25 MG (82553 UT) CAPS capsule TAKE 1 CAPSULE BY MOUTH ONCE A WEEK 12 capsule 1    allopurinol (ZYLOPRIM) 100 MG tablet Take 1 tablet by mouth daily 90 tablet 1    losartan (COZAAR) 50 MG tablet Take 1 tablet by mouth daily 90 tablet 1    levothyroxine (SYNTHROID) 125 MCG tablet Take 1 tablet by mouth Daily 90 tablet 1    fluticasone (FLONASE) 50 MCG/ACT nasal spray 2 sprays by Each Nostril route daily 16 g 0    apixaban (ELIQUIS) 2.5 MG TABS tablet Take 1 tablet by mouth 2 times daily 180 tablet 1    metoprolol succinate (TOPROL XL) 25 MG extended release tablet Take 0.5 tablets by mouth daily . 5 tablet daily 45 tablet 0    topiramate (TOPAMAX) 50 MG tablet TAKE 2 TABLETS TWICE DAILY 360 tablet 1       Allergies   Allergen Reactions    Aspirin Nausea Only    Diltiazem Anaphylaxis    Diltiazem Hcl Anaphylaxis    Lorazepam Other (See Comments)     hallucinations  hallucinations  hallucinations    Sulfa Antibiotics Rash and Hives    Atorvastatin Other (See Comments)     Muscle pains  Muscle pains  Muscle pains    Dabigatran Nausea Only     And indigestion  And indigestion    Aka Pradaxa  And indigestion  And indigestion  And indigestion    Aka Pradaxa  And indigestion  And indigestion  And indigestion    Aka Pradaxa    Mysoline [Primidone]     Nsaids      Other reaction(s): Unknown    Other Other (See Comments)     Nitroglycerin patches causes severe headaches    Primidone      Other reaction(s): Unknown       Social History     Tobacco Use    Smoking status: Former Smoker     Packs/day: 1.00     Years: 28.00     Pack years: 28.00     Types: Cigarettes     Quit date: 1/1/1987 Years since quittin.2    Smokeless tobacco: Never Used    Tobacco comment: H.O.smoking at age 15 / smoked up to 1 p.p.d / quit    Substance Use Topics    Alcohol use: Not Currently     Alcohol/week: 0.0 standard drinks       /70 (Site: Right Upper Arm, Position: Sitting, Cuff Size: Medium Adult)   Pulse 55   Temp 97.3 °F (36.3 °C) (Infrared)   Wt 129 lb (58.5 kg)   SpO2 99%   BMI 22.85 kg/m²       Objective:   Physical Exam  Constitutional:       General: She is not in acute distress. HENT:      Right Ear: Tympanic membrane and ear canal normal.      Left Ear: Tympanic membrane and ear canal normal.   Neck:      Vascular: No carotid bruit. Cardiovascular:      Rate and Rhythm: Normal rate and regular rhythm. Heart sounds: No murmur heard. Abdominal:      General: Bowel sounds are normal.      Palpations: Abdomen is soft. Tenderness: There is no abdominal tenderness. There is no right CVA tenderness or left CVA tenderness. Musculoskeletal:      Cervical back: Neck supple. Lymphadenopathy:      Cervical: No cervical adenopathy. Neurological:      Mental Status: She is alert and oriented to person, place, and time. Cranial Nerves: Cranial nerves are intact. Sensory: Sensation is intact. Motor: Motor function is intact. Coordination: Romberg sign positive. Coordination normal.      Gait: Tandem walk abnormal.   Psychiatric:         Behavior: Behavior is cooperative. Assessment:      Miranda De La Cruz was seen today for ed follow-up. Diagnoses and all orders for this visit:    Acute cystitis with hematuria    Acute renal failure, unspecified acute renal failure type [facility])    Vertigo            Plan:      Hospital information reviewed independently and reviewed with patient and daughter. Agreed with current antibiotic therapy and staying off the Demadex medication.     She has appointment in 2 days with nephrology and plan is to recheck kidney function at that time. In the interim drink plenty of fluids and avoid salt in the diet. Keep RTC appointment in April    Medical decision making of moderate complexity. Please note that this chart was generated using Dragon dictation software. Although every effort was made to ensure the accuracy of this automated transcription, some errors in transcription may have occurred.

## 2022-04-11 NOTE — PROGRESS NOTES
[facility]   Electrophysiology Follow up   Date: 4/12/2022  I had the privilege of visiting Savana Crury in the office. CC: Nikki echodensity  HPI: Savana Curry is a 80 y.o. female with a history of paroxysmal atrial fib and underwent cardioversion in 2008. She had recurrent Atrial fib and underwent RFCA on 4/24/09 by Dr. Jessy Littlejohn. .    Underwent dual chamber pacemaker [employer]) on 11/8/13 for sick sinus syndrome , and AV block reported at [facility] 1490.       She is s/p  generator change out on 01/25/2021. Noted atrial lead issues, tried to repair the lead which was not effective and had a new RA lead inserted.      She has significant Coronary disease with history of CABG and multiple interventions. On 02/02/2017 she had a C which showed chronic occlusion of the LAD and has seen interventional cardiology for potential interventions.     She was seen on 08/02/2017 and reported having paloitations and chest pain for one months. ECG was done which showed atrial flutter irate in the 140's. She is anticoagulated with Xarelto.     She underwent Cardioversion 08/02/2017.     She had a PFT that showed severe restrictive disease and she has not followed up with Pulmonology. She was encouraged to see Dr. Kayla Saunders. She was a previous smoker with suspected COPD and amiodarone is not an option for rate control.     She is followed by heart failure.     She has had recurrent falls. 12/16/2020  Admission at [facility] for concerns of left kidney contusion s/p fall.      Underwent Watchman procedure on 5/17/2021.         Follow up DONA with less than 3 mm feliciano-device leak. She is here for follow up DONA.        DONA 1/12/2022 showed  There is a small an echogenic mass on the surface of the device, consistent   with thrombus near the coumadin ridge. Eduarda Watts presents to the office in follow up. She does not have any cardiac complaints at this time.  She does have some bruising from her Eliquis therapy but has remained compliant on this. She has a bad hip and has caused her to not be very active d/t this. We discussed her DONA in detail and need for repeat DONA around 7/12/2022. 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 On Eliquis 2.5 mg BID   Tolerating well. Discussed fall precautions. Will repeat DONA around 7/12/2022    Paroxysmal atrial fibrillation    ECG today shows SR, 1st degree AV block    Patient has high [id number] score 6 and requires anticoagulation to prevent thromboembolic events. [id number] Score: 5   S/p Watchman device in 5/2021. Follow up DONA 7/7/2021 with less than 3 mm leak. DONA 1/12/2022 showed  There is a small an echogenic mass on the surface of the device, consistent   with thrombus near the coumadin ridge. Continues on Eliquis 2.5 mg BID       Recurrent atrial flutter/tachycardia/fibrillation              Antiarrythmic therapy is limited since she has extensive structural heart disease. Amiodarone is not indicated because she has severe abnormal PFT. S/p DCCV 08/02/2017                S/p RFCA for Afib at [address] [place] [address] by Dr. Loreto Nevarez 2009                Toprol XL 25 mg BID              Xarelto 10 mg Creatinine Clearance 27 ml/min              High [id number] score and high risk for stroke and thromboembolism  .      Shortness of Breath/ Severe COPD               PFT 2018 shows severe restrictive lung disease              ECHO 04/30/2020 LVEF 45 %     Bradycardia/Sinus node dysfunction              S/p dual chamber pacemaker, generator change with new RA lead 01/25/2021   The CIED was interrogated and programmed and I supervised and reviewed all the data. All findings and changes are in device interrogation sheet and reflect my personal interpretation and changes and is scanned to Epic. CAD              Stable               On BB. Allergy ASA      HTN  -Controlled  -BP goal <130/80  -Home BP monitoring encouraged, printed information provided on how to accurately measure BP at home.  -Counseled to follow a low salt diet to assure blood pressure remains controlled for cardiovascular risk factor modification.   -The patient is counseled to get regular exercise 3-5 times per week and maintain a healthy weight reduce cardiovascular risk factors.         Chronic Systolic Heart failure    Followed by heart failure   Echo 04/30/2020 LVEF 45 %   Aldactone 12.5 mg 3 times weekly,Toprol XL BID       Patient Active Problem List    Diagnosis Date Noted    Pacemaker 04/24/2017    Cerebrovascular accident (CVA) ([address]     HTN (hypertension), benign     Fibrocystic breast 03/03/2017    Allergic rhinitis 05/26/2015    Sick sinus syndrome ([address] 11/19/2013    COPD (chronic obstructive pulmonary disease) ([address] 02/14/2013    Restrictive lung disease 02/14/2013    Non-rheumatic mitral regurgitation 07/20/2012    Chronic gouty arthropathy     Acquired hypothyroidism     Mixed hyperlipidemia 05/16/2011    Presence of Watchman left atrial appendage closure device 05/17/2021    Symptomatic bradycardia     Pacemaker lead failure     Memory loss due to medical condition 09/09/2020    Bilateral sensorineural hearing loss 07/15/2020    Iron deficiency anemia 04/30/2020    Anemia 04/30/2020    Peripheral vertigo, bilateral     PAF (paroxysmal atrial fibrillation) ([address]     Dyslipidemia     Dizziness 02/06/2020    Ischemic cardiomyopathy 12/04/2019    Idiopathic peripheral neuropathy 11/22/2019    Atherosclerosis of native arteries of extremities with intermittent claudication, bilateral legs (HCC)     Stage 3 chronic kidney disease ([address] 07/22/2019    PAD (peripheral artery disease) ([address] 07/22/2019    Chronic systolic heart failure ([address] 12/28/2018    Age related osteoporosis 05/01/2018    Chronic total occlusion of native coronary artery 04/12/2018    Tremor 11/08/2017    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/10/2017    Vitamin D deficiency 08/10/2017    PAT [facility]ox[facility]mal [facility]cardia) [facility] 08/08/2017     Diagnostic studies:   ECG 4/12/22  SR  423 QTcH, 103 QRS    DONA 1/12/2022   Summary   Normal left ventricle size, wall thickness, and systolic function with an   estimated ejection fraction of 55-60%. Mitral valve leaflets appear moderately thickened. The posterior leaflet   appears severely restricted. Moderate mitral regurgitation is present. Mitral annular calcification. Left atrial size is dilated. There is a Watchman device seated with a   feliciano-device leak measured at 2 mm on the anterior side. There is a small an echogenic mass on the surface of the device, consistent   with thrombus near the coumadin ridge. Mild aortic regurgitation. The thoracic aorta has moderate-severe plaque. Moderate tricuspid regurgitation. ECHO 04/30/2020   Left ventricular cavity size is normal. Normal left ventricular wall   thickness. Left ventricular function appears to be reduced with an estimated   ejection fraction of 45%.  Diffuse hypokinesis.      Echo 7/19/19   Summary     Left ventricle - normal size, thickness, reduced function with EF of 45%     LV wall motion - diffuse hypokinesis.     Mitral valve - thickened, annular calcification, moderate regurgitation     Aortic valve - thickened, calcified, mild regurgitation     Tricuspid valve - mild regurgitation with PASP of 25mmHg     Pulmonic valve - trivial regurgitation     Biatrial enlargement     Pacemaker / ICD lead is visualized in the right heart.     I independently reviewed the cardiac diagnostic studies, ECG and relevant imaging studies.      [place] 05/20/2019  Findings:                      LM       Normal              LAD     50% ostial, mid 100%              Cx        40% OM1 at bifurcation              RCA     Mid 100%              LVG     Not performed              EDP     15 mmHg L-LAD  Patent              S-PDA Known occluded  Severe Ca++:None  Post Cath Dx:Stable CAD, no apparent culprit for NSTEMI  Intervention:  None  Med Rec:        Continue aggressive med tx                          Continue plavix, no asa due      Cath: 2/3/17 showed 100% prox RCA with bridging collateral, 100% SVG to RCA, 100% prox LAD after D1, D1 stent widely patent, Mild Cx disease, Normal LV FXN--EF 60%--EDP 12 post hydration.               Stable anatomy        Nuc stress 6/28/16: ([facility])   Final Conclusions/Summary  Stress ECG Impressions: No significant ST changes during vasodilator infusion  Summary: - Abnormal moderat risk stress test with moderate size and severity  anterolateral wall ischemia - Normal LV size and systolic function         I independently reviewed the cardiac diagnostic studies, ECG and relevant imaging studies. Lab Results   Component Value Date    LVEF 58 01/12/2022    LVEFMODE Echo 07/19/2019     Lab Results   Component Value Date    TSH 0.90 03/25/2021         Physical Examination:  Vitals:    04/12/22 1408   BP: 138/68   Pulse: 70   SpO2: 96%      Wt Readings from Last 3 Encounters:   04/12/22 119 lb (54 kg)   03/24/22 127 lb (57.6 kg)   03/21/22 129 lb (58.5 kg)       · Constitutional: Oriented. No distress. · Head: Normocephalic and atraumatic. · Mouth/Throat: Oropharynx is clear and moist.   · Eyes: Conjunctivae normal. EOM are normal.   · Neck: Neck supple. No JVD present. · Cardiovascular: Normal rate, regular rhythm, S1&S2. · Pulmonary/Chest: Bilateral respiratory sounds. No rhonchi. · Abdominal: Soft. No tenderness. · Musculoskeletal: No tenderness. No edema    · Lymphadenopathy: Has no cervical adenopathy. · Neurological: Alert and oriented. Follows command, No Gross deficit   · Skin: Skin is warm, No rash noted.    · Psychiatric: Has a normal behavior       Review of System:  [x] Full ROS obtained and negative except as mentioned in HPI    Prior to Admission medications    Medication Sig Start Date End Date Taking? Authorizing Provider   vitamin D (ERGOCALCIFEROL) 1.25 MG (66310 UT) CAPS capsule TAKE 1 CAPSULE BY MOUTH ONCE A WEEK 2/28/22  Yes Shelli Feng MD   allopurinol (ZYLOPRIM) 100 MG tablet Take 1 tablet by mouth daily 2/8/22  Yes Shelli Feng MD   losartan (COZAAR) 50 MG tablet Take 1 tablet by mouth daily 2/8/22  Yes Shelli Feng MD   nitroGLYCERIN (NITROLINGUAL) 0.4 MG/SPRAY 0.4 mg spray Place 1 spray under the tongue every 5 minutes as needed for Chest pain 2/8/22  Yes Shelli Feng MD   levothyroxine (SYNTHROID) 125 MCG tablet Take 1 tablet by mouth Daily 1/26/22  Yes Shelli Feng MD   fluticasone [facility]) 50 MCG/ACT nasal spray 2 sprays by Each Nostril route daily 1/25/22  Yes TIFFANIE Soto CNP   apixaban (ELIQUIS) 2.5 MG TABS tablet Take 1 tablet by mouth 2 times daily 1/12/22  Yes Arian Mays MD   metoprolol succinate (TOPROL XL) 25 MG extended release tablet Take 0.5 tablets by mouth daily . 5 tablet daily 12/6/21  Yes TIFFANIE Power CNP   topiramate (TOPAMAX) 50 MG tablet TAKE 2 TABLETS TWICE DAILY 11/29/21  Yes Shelli Feng MD   acetaminophen (TYLENOL) 325 MG tablet Take 1,000 mg by mouth daily  4/5/21  Yes Historical Provider, MD   torsemide (DEMADEX) 10 MG tablet TAKE 1 TABLET EVERY OTHER DAY ALTERNATING WITH  2  TABLETS EVERY OTHER DAY 5/25/21  Yes Shelli Feng MD   DULoxetine (CYMBALTA) 30 MG extended release capsule Take 1 capsule by mouth daily  Patient not taking: Reported on 4/12/2022 3/4/22   Shelli Feng MD   betamethasone valerate (VALISONE) 0.1 % cream Apply topically 2 times daily.   Patient not taking: Reported on 4/12/2022 6/29/21   Shelli Feng MD       Allergies   Allergen Reactions    Aspirin Nausea Only    Diltiazem Anaphylaxis    Diltiazem Hcl Anaphylaxis    Lorazepam Other (See Comments)     hallucinations  hallucinations  hallucinations    Sulfa Antibiotics Rash and Hives    Atorvastatin Other (See Comments)     Muscle pains  Muscle pains  Muscle pains    Dabigatran Nausea Only     And indigestion  And indigestion    Aka Pradaxa  And indigestion  And indigestion  And indigestion    Aka Pradaxa  And indigestion  And indigestion  And indigestion    Aka Pradaxa    Mysoline [Primidone]     Nsaids      Other reaction(s): Unknown    Other Other (See Comments)     Nitroglycerin patches causes severe headaches    Primidone      Other reaction(s): Unknown       Social History:  Reviewed. reports that she quit smoking about 35 years ago. Her smoking use included cigarettes. She has a 28.00 pack-year smoking history. She has never used smokeless tobacco. She reports previous alcohol use. She reports that she does not use drugs. Family History:  Reviewed. Reviewed. No family history of SCD. Relevant and available labs, and cardiovascular diagnostics reviewed. Reviewed. I independently reviewed relevant and available cardiac diagnostic tests ECG, CXR, Echo, Stress test, Device interrogation, Holter, CT scan. Complex medical condition with multiple medical problems affecting prognosis and outcome of EP interventions      All questions and concerns were addressed to the patient/family. Alternatives to my treatment were discussed. I have discussed the above stated plan and the patient verbalized understanding and agreed with the plan. Scribe attestation: This note was scribed in the presence of Medina Kwan MD by Brittany Gibson RN  Physician Attestation: I, Dr. Medina Kwan, confirm that the scribe's documentation has been prepared under my direction and personally reviewed by me in its entirety. I also confirm that the note above accurately reflects all work, treatment, procedures, and medical decision making performed by me. NOTE: This report was transcribed using voice recognition software.  Every effort was made to ensure accuracy, however, inadvertent computerized transcription errors may be present.      Genesis Ely MD, MPH  [facility]   Office: [phone]  Fax: [phone]

## 2022-04-12 ENCOUNTER — NURSE ONLY (OUTPATIENT)
Dept: CARDIOLOGY CLINIC | Age: 82
End: 2022-04-12
Payer: MEDICARE

## 2022-04-12 ENCOUNTER — OFFICE VISIT (OUTPATIENT)
Dept: CARDIOLOGY CLINIC | Age: 82
End: 2022-04-12
Payer: MEDICARE

## 2022-04-12 VITALS
WEIGHT: 119 LBS | BODY MASS INDEX: 21.09 KG/M2 | HEART RATE: 70 BPM | HEIGHT: 63 IN | SYSTOLIC BLOOD PRESSURE: 138 MMHG | DIASTOLIC BLOOD PRESSURE: 68 MMHG | OXYGEN SATURATION: 96 %

## 2022-04-12 DIAGNOSIS — Z95.0 PACEMAKER: ICD-10-CM

## 2022-04-12 DIAGNOSIS — I50.22 CHRONIC SYSTOLIC HEART FAILURE (HCC): ICD-10-CM

## 2022-04-12 DIAGNOSIS — I49.5 SICK SINUS SYNDROME (HCC): ICD-10-CM

## 2022-04-12 DIAGNOSIS — I25.5 ISCHEMIC CARDIOMYOPATHY: ICD-10-CM

## 2022-04-12 DIAGNOSIS — I34.0 NON-RHEUMATIC MITRAL REGURGITATION: Primary | ICD-10-CM

## 2022-04-12 DIAGNOSIS — I10 HTN (HYPERTENSION), BENIGN: ICD-10-CM

## 2022-04-12 DIAGNOSIS — I48.0 PAF (PAROXYSMAL ATRIAL FIBRILLATION) (HCC): ICD-10-CM

## 2022-04-12 DIAGNOSIS — R00.1 SYMPTOMATIC BRADYCARDIA: ICD-10-CM

## 2022-04-12 DIAGNOSIS — Z95.818 PRESENCE OF WATCHMAN LEFT ATRIAL APPENDAGE CLOSURE DEVICE: ICD-10-CM

## 2022-04-12 PROCEDURE — 4040F PNEUMOC VAC/ADMIN/RCVD: CPT | Performed by: INTERNAL MEDICINE

## 2022-04-12 PROCEDURE — 99214 OFFICE O/P EST MOD 30 MIN: CPT | Performed by: INTERNAL MEDICINE

## 2022-04-12 PROCEDURE — G8427 DOCREV CUR MEDS BY ELIG CLIN: HCPCS | Performed by: INTERNAL MEDICINE

## 2022-04-12 PROCEDURE — 1123F ACP DISCUSS/DSCN MKR DOCD: CPT | Performed by: INTERNAL MEDICINE

## 2022-04-12 PROCEDURE — 1036F TOBACCO NON-USER: CPT | Performed by: INTERNAL MEDICINE

## 2022-04-12 PROCEDURE — G8420 CALC BMI NORM PARAMETERS: HCPCS | Performed by: INTERNAL MEDICINE

## 2022-04-12 PROCEDURE — 93280 PM DEVICE PROGR EVAL DUAL: CPT | Performed by: INTERNAL MEDICINE

## 2022-04-12 PROCEDURE — 1090F PRES/ABSN URINE INCON ASSESS: CPT | Performed by: INTERNAL MEDICINE

## 2022-04-12 PROCEDURE — G8399 PT W/DXA RESULTS DOCUMENT: HCPCS | Performed by: INTERNAL MEDICINE

## 2022-04-12 NOTE — LETTER
[facility] 81  EP Procedure Sheet    4/12/22  Mario Cordova  1940  EP Procedures  [] Pacemaker implant (single/dual) [] EP Study   [] ICD implant (single/dual) [] Atrial flutter ablation (DONA Y/N)   [] Biv implant ICD [] Tilt Table   [] Biv implant PPM [] Atrial fibrillation ablation (DONA Yes)   [] Generator Change (PPM/ICD/BiV) [] SVT ablation   [] Lead revision (RV/LA/RA) (<1 month) [] PVC ablation     [] Lead extraction +/- upgrade (BiV/PPM/ICD) [] VT Ischemic/ non-ischemic   [] Loop implant/ removal [] VT RVOT   [] Cardioversion [] VT Left sided   [x] DONA (claude Thrombus) [] AVN ablation   Equipment  [] [employer]  [] REBECA Mapping System   [] [place] [] [address]   [] [employer] [] CryoAblation   [] Biotronik [] Laser Lead Extraction   EP Procedures Scheduling Request  # hours Requested  []1 []2 []2-4 [] 4-6 Scheduled  Date:   Specific Day Around 7/12/2022 Completed    Anesthesia []yes []no F/u Date:   CT surgery backup []yes []no COVID     Overnight stay      Performing MD  [x]RMM []MXA   []MKW [] Other _______ First vs repeat   []1st [] 2nd [] 3rd   Pre-Procedure Labs / Imaging  [] PT/INR [] Type & cross   [] CBC [] Units PRBC   [] BMP/Mg [] Units FFP   [] Venogram [] Cardiac CTA for Pulmonary vein mapping     RN INITIALS:Ra     Patient Instructions  Do not eat or drink after midnight the night prior to procedure  Dx:S/P Watchman  ICD-10 code: D14.052

## 2022-04-21 DIAGNOSIS — M15.9 PRIMARY OSTEOARTHRITIS INVOLVING MULTIPLE JOINTS: ICD-10-CM

## 2022-04-21 RX ORDER — HYDROCODONE BITARTRATE AND ACETAMINOPHEN 5; 325 MG/1; MG/1
1 TABLET ORAL 4 TIMES DAILY PRN
Qty: 120 TABLET | Refills: 0 | Status: SHIPPED | OUTPATIENT
Start: 2022-04-21 | End: 2022-06-10 | Stop reason: SDUPTHER

## 2022-04-21 NOTE — TELEPHONE ENCOUNTER
HYDROcodone-acetaminophen (NORCO) 5-325 MG per tablet 120 tablet 0 3/4/2022 4/3/2022    Sig - Route:  Take 1 tablet by mouth 4 times daily as needed for Pain for up to 30 days. - Oral      Pharmacy    [facility] [id number] - [address] [phone] Alyx Mccabe [phone]   [place] [address]   Phone:  [phone]  Fax:  [phone]       Provider out of the office

## 2022-04-21 NOTE — TELEPHONE ENCOUNTER
Medication:   Requested Prescriptions     Pending Prescriptions Disp Refills    HYDROcodone-acetaminophen (NORCO) 5-325 MG per tablet 120 tablet 0     Sig: Take 1 tablet by mouth 4 times daily as needed for Pain for up to 30 days. Last Filled:  3/4/22    Patient Phone Number: [phone] (home) [phone] (work)    Last appt: 3/21/2022   Next appt: Visit date not found    Last OARRS:   RX Monitoring 6/1/2021   Attestation -   Periodic Controlled Substance Monitoring Possible medication side effects, risk of tolerance/dependence & alternative treatments discussed.      PDMP Monitoring:    Last PDMP [place]eder Median as Reviewed [facility]):  Review User Review Instant Review Result   Love Andre 6/1/2021  4:31 PM Reviewed PDMP [1]     Preferred Pharmacy:     [facility] [phone] - [id number], [address] [place], [place] [id number] [facility] [phone]  [address] [place], [place] [id number]  Phone: [phone] Fax: [phone]

## 2022-05-09 ENCOUNTER — OFFICE VISIT (OUTPATIENT)
Dept: FAMILY MEDICINE CLINIC | Age: 82
End: 2022-05-09
Payer: MEDICARE

## 2022-05-09 VITALS — HEART RATE: 70 BPM | OXYGEN SATURATION: 96 % | TEMPERATURE: 96.7 F

## 2022-05-09 DIAGNOSIS — M15.9 PRIMARY OSTEOARTHRITIS INVOLVING MULTIPLE JOINTS: ICD-10-CM

## 2022-05-09 DIAGNOSIS — K91.89 POSTCHOLECYSTECTOMY DIARRHEA: ICD-10-CM

## 2022-05-09 DIAGNOSIS — J20.9 ACUTE BRONCHITIS, UNSPECIFIED ORGANISM: Primary | ICD-10-CM

## 2022-05-09 DIAGNOSIS — R19.7 POSTCHOLECYSTECTOMY DIARRHEA: ICD-10-CM

## 2022-05-09 DIAGNOSIS — M53.3 CHRONIC RIGHT SACROILIAC PAIN: ICD-10-CM

## 2022-05-09 DIAGNOSIS — G89.29 CHRONIC RIGHT SACROILIAC PAIN: ICD-10-CM

## 2022-05-09 PROCEDURE — 4040F PNEUMOC VAC/ADMIN/RCVD: CPT | Performed by: FAMILY MEDICINE

## 2022-05-09 PROCEDURE — 99214 OFFICE O/P EST MOD 30 MIN: CPT | Performed by: FAMILY MEDICINE

## 2022-05-09 PROCEDURE — G8399 PT W/DXA RESULTS DOCUMENT: HCPCS | Performed by: FAMILY MEDICINE

## 2022-05-09 PROCEDURE — G8428 CUR MEDS NOT DOCUMENT: HCPCS | Performed by: FAMILY MEDICINE

## 2022-05-09 PROCEDURE — 1123F ACP DISCUSS/DSCN MKR DOCD: CPT | Performed by: FAMILY MEDICINE

## 2022-05-09 PROCEDURE — 1090F PRES/ABSN URINE INCON ASSESS: CPT | Performed by: FAMILY MEDICINE

## 2022-05-09 PROCEDURE — G8420 CALC BMI NORM PARAMETERS: HCPCS | Performed by: FAMILY MEDICINE

## 2022-05-09 PROCEDURE — 1036F TOBACCO NON-USER: CPT | Performed by: FAMILY MEDICINE

## 2022-05-09 RX ORDER — PREDNISONE 10 MG/1
10 TABLET ORAL 2 TIMES DAILY
Qty: 20 TABLET | Refills: 0 | Status: SHIPPED | OUTPATIENT
Start: 2022-05-09 | End: 2022-08-16 | Stop reason: SDUPTHER

## 2022-05-09 NOTE — PROGRESS NOTES
Subjective:      Patient ID: Neelam Albert is a 80 y.o. female. HPI patient presents in accompaniment of daughter with several problems. First she was told that she has pneumonia and she is on third day of antibiotic therapy and she feels that problem is improving. Secondly she still in her right lower back and hip pain. And also she has been having loose bowels that become progressively worse over the last 4 to 5 months. Patient is status post cholecystectomy. Review of Systems  Allergies   Allergen Reactions    Aspirin Nausea Only    Diltiazem Anaphylaxis    Diltiazem Hcl Anaphylaxis    Lorazepam Other (See Comments)     hallucinations  hallucinations  hallucinations    Sulfa Antibiotics Rash and Hives    Atorvastatin Other (See Comments)     Muscle pains  Muscle pains  Muscle pains    Dabigatran Nausea Only     And indigestion  And indigestion    Aka Pradaxa  And indigestion  And indigestion  And indigestion    Aka Pradaxa  And indigestion  And indigestion  And indigestion    Aka Pradaxa    Mysoline [Primidone]     Nsaids      Other reaction(s): Unknown    Other Other (See Comments)     Nitroglycerin patches causes severe headaches    Primidone      Other reaction(s): Unknown     Vitals:    05/09/22 1516   Pulse: 70   Temp: 96.7 °F (35.9 °C)   SpO2: 96%       Objective:   Physical Exam  Constitutional:       General: She is not in acute distress. HENT:      Right Ear: Tympanic membrane normal.      Left Ear: Tympanic membrane normal.      Nose: Nose normal.      Mouth/Throat:      Pharynx: Uvula midline. Pulmonary:      Effort: Pulmonary effort is normal.      Breath sounds: Rhonchi present. No decreased breath sounds. Abdominal:      General: Bowel sounds are normal. There is no distension. Palpations: Abdomen is soft. Tenderness: There is no abdominal tenderness. There is no right CVA tenderness, left CVA tenderness or guarding.    Musculoskeletal:      Cervical back: Neck supple. Lymphadenopathy:      Cervical: No cervical adenopathy. Neurological:      Mental Status: She is alert. Assessment:      Hugo Iyer was seen today for pneumonia and hip pain. Diagnoses and all orders for this visit:    Acute bronchitis, unspecified organism    Primary osteoarthritis involving multiple joints    Chronic right sacroiliac pain    Postcholecystectomy diarrhea    Other orders  -     predniSONE (DELTASONE) 10 MG tablet; Take 1 tablet by mouth 2 times daily for 10 days  -     cholestyramine light 4 g POWD; Take 1 packet by mouth daily            Plan:      Recommend to complete current antibiotic therapy    For the osteoarthritis and back pain recommend patient start prednisone therapy to see if she responds. We did discuss possibly seeing pain management for injection of the sacroiliac joint in the future. For the postcholecystectomy diarrhea recommend she start cholestyramine daily    RTC at normal appointment time    Medical decision making of moderate complexity. Please note that this chart was generated using Dragon dictation software. Although every effort was made to ensure the accuracy of this automated transcription, some errors in transcription may have occurred.             Hui Tello MD

## 2022-05-12 ENCOUNTER — TELEPHONE (OUTPATIENT)
Dept: FAMILY MEDICINE CLINIC | Age: 82
End: 2022-05-12

## 2022-05-13 NOTE — TELEPHONE ENCOUNTER
Recommend she obviously finish antibiotic therapy and often people after having pneumonia take several weeks before the ever return of the energy

## 2022-05-16 ENCOUNTER — TELEPHONE (OUTPATIENT)
Dept: FAMILY MEDICINE CLINIC | Age: 82
End: 2022-05-16

## 2022-05-16 NOTE — TELEPHONE ENCOUNTER
levothyroxine (SYNTHROID) 125 MCG tablet 90 tablet 1 1/26/2022     Sig - Route:  Take 1 tablet by mouth Daily - Oral    Sent to pharmacy as: Levothyroxine Sodium 125 MCG Oral Tablet (SYNTHROID)      [address] Wendy Kangilyanayan [phone] - F [phone]

## 2022-05-27 ENCOUNTER — HOSPITAL ENCOUNTER (OUTPATIENT)
Age: 82
Discharge: HOME | End: 2022-05-27
Payer: MEDICARE

## 2022-05-27 ENCOUNTER — CARE COORDINATION (OUTPATIENT)
Dept: CARE COORDINATION | Age: 82
End: 2022-05-27

## 2022-05-27 ENCOUNTER — HOSPITAL ENCOUNTER (OUTPATIENT)
Dept: GENERAL RADIOLOGY | Age: 82
Discharge: HOME | End: 2022-05-27
Payer: MEDICARE

## 2022-05-27 DIAGNOSIS — J20.9 ACUTE BRONCHITIS, UNSPECIFIED ORGANISM: Primary | ICD-10-CM

## 2022-05-27 DIAGNOSIS — J20.9 ACUTE BRONCHITIS, UNSPECIFIED ORGANISM: ICD-10-CM

## 2022-05-27 PROCEDURE — 71046 X-RAY EXAM CHEST 2 VIEWS: CPT

## 2022-05-27 RX ORDER — CEFDINIR 300 MG/1
CAPSULE ORAL
COMMUNITY
Start: 2022-03-15 | End: 2022-06-28 | Stop reason: ALTCHOICE

## 2022-05-27 NOTE — CARE COORDINATION
ACM contacted patient regarding enrollment into Care Management from [facility] List.  Will make further outreach to patient to complete enrollment. Patient was given doxycycline hyclate 100 mg capsule for treatment of a lung infection on 5/6/2022 and seen at PCP office 5/9/2022 and prescribed prednisone. Per patient she is still having chest congestion with a productive cough. Per patient she denies any new or worsening symptoms and she has felt feverish but whenever checked she has not had fever. No RED flags were reported at time of call. Patient is requesting a chest xray to recheck her lungs. She is concerned with the amount of time she has had her symptoms. She was given guidelines for s/sx monitoring for when to call PCP office and when to go to ED. She is aware that there is a doctor on call for any weekend concerns.

## 2022-05-27 NOTE — CARE COORDINATION
ACM called and advised patient that xray was in system. She was given radiology number to call for locations and hours.

## 2022-05-31 ENCOUNTER — TELEPHONE (OUTPATIENT)
Dept: FAMILY MEDICINE CLINIC | Age: 82
End: 2022-05-31

## 2022-05-31 DIAGNOSIS — R09.89 PULMONARY CONGESTION: ICD-10-CM

## 2022-05-31 DIAGNOSIS — J20.9 ACUTE BRONCHITIS, UNSPECIFIED ORGANISM: ICD-10-CM

## 2022-05-31 DIAGNOSIS — I50.9 CONGESTIVE HEART FAILURE, UNSPECIFIED HF CHRONICITY, UNSPECIFIED HEART FAILURE TYPE (HCC): Primary | ICD-10-CM

## 2022-05-31 NOTE — TELEPHONE ENCOUNTER
What can I use for the diagnosis for the labs, Medicare is rejecting pulmonary congestion for the BNP.  Please advise

## 2022-05-31 NOTE — TELEPHONE ENCOUNTER
----- Message from Harley Moody MD sent at 5/31/2022  9:18 AM EDT -----  CXR shows fluid in lungs, no signs of infection.    Daily weights  Increase torsemide 2 tabs daily x 5 days then go back to the alternating 1 and 2 tabs daily  Get BNP and BMP in 1 week

## 2022-06-08 ENCOUNTER — CARE COORDINATION (OUTPATIENT)
Dept: CARE COORDINATION | Age: 82
End: 2022-06-08

## 2022-06-08 LAB
ALBUMIN SERPL-MCNC: 3.5 G/DL (ref 3.5–5.7)
ANION GAP 4: 10 MMOL/L (ref 7–16)
B-TYPE NATRIURETIC PEPTIDE: 364 PG/ML
BUN BLDV-MCNC: 33 MG/DL (ref 7–25)
CALCIUM SERPL-MCNC: 9 MG/DL (ref 8.6–10.2)
CHLORIDE BLD-SCNC: 110 MMOL/L (ref 98–107)
CO2: 23 MMOL/L (ref 21–31)
CREATININE + EGFR PANEL: 1.4 MG/DL (ref 0.6–1.2)
CREATININE URINE: 53.96 MG/DL
CREATININE URINE: 56.74 MG/DL
GFR CALCULATED: 36 ML/MIN/1.73M2
GFR CALCULATED: 44 ML/MIN/1.73M2
GLUCOSE: 80 MG/DL (ref 74–109)
HEMOGLOBIN URINE, QUAL: 12.1 G/DL (ref 12.1–15.8)
MCH RBC QN AUTO: 26.9 PG (ref 28.4–33.4)
MCHC RBC AUTO-ENTMCNC: 31.6 G/DL (ref 31.1–37)
MCV RBC AUTO: 85.2 FL (ref 85–99)
MICROALBUMIN UR-MCNC: 31.8 MG/L
MICROALBUMIN/CREAT UR-RTO: 58.9 MG ALB/G CREAT
PARATHYROID HORMONE INTACT: 40.4 PG/ML (ref 12–88)
PDW BLD-RTO: 21.3 % (ref 11.7–15.2)
PHOSPHORUS: 3.2 MG/DL (ref 2.5–5)
PLATELET COUNT MANUAL: 269 K/UL (ref 154–393)
POTASSIUM SERPL-SCNC: 3.9 MMOL/L (ref 3.5–5.3)
PROTEIN UA: 9 MG/DL
PROTEIN/CREAT RATIO URINE RAN: 0.2 MG/DL (ref 0–0.1)
RBC # BLD: 4.5 M/UL (ref 3.86–5.17)
SODIUM BLD-SCNC: 143 MMOL/L (ref 136–145)
SR HEMATOCRIT: 38.4 % (ref 35.8–46.5)
VITAMIN D 25-HYDROXY: 45.7 NG/ML
WBC BLOOD, MANUAL: 7.1 K/UL (ref 4–10.5)

## 2022-06-08 NOTE — CARE COORDINATION
ACM made outreach to patient to complete Care Management enrollment. Patient started the process but was unable to complete. Patient's daughter arrived to take patient to an appointment. ACM will follow up with patient at a later date/time.

## 2022-06-09 DIAGNOSIS — M15.9 PRIMARY OSTEOARTHRITIS INVOLVING MULTIPLE JOINTS: ICD-10-CM

## 2022-06-09 NOTE — TELEPHONE ENCOUNTER
HYDROcodone-acetaminophen (NORCO) 5-325 MG per tablet [[id number]]  ENDED    Order Details  Dose: 1 tablet Route: Oral Frequency: 4 TIMES DAILY PRN for Pain   Dispense Quantity: 120 tablet Refills: 0    Note to Pharmacy: Reduce doses taken as pain becomes manageable         Sig: Take 1 tablet by mouth 4 times daily as needed for Pain for up to 30 days.      Pharmacy:    [facility]ysh James [id number] - [address] [phone] [facility] Figures [phone]   [place] [address]   Phone:  [phone]  Fax:  [phone]

## 2022-06-10 DIAGNOSIS — M15.9 PRIMARY OSTEOARTHRITIS INVOLVING MULTIPLE JOINTS: ICD-10-CM

## 2022-06-10 RX ORDER — HYDROCODONE BITARTRATE AND ACETAMINOPHEN 5; 325 MG/1; MG/1
1 TABLET ORAL 4 TIMES DAILY PRN
Qty: 120 TABLET | Refills: 0 | Status: SHIPPED | OUTPATIENT
Start: 2022-06-10 | End: 2022-07-15 | Stop reason: SDUPTHER

## 2022-06-10 RX ORDER — HYDROCODONE BITARTRATE AND ACETAMINOPHEN 5; 325 MG/1; MG/1
1 TABLET ORAL 4 TIMES DAILY PRN
Qty: 120 TABLET | Refills: 0 | Status: SHIPPED | OUTPATIENT
Start: 2022-06-10 | End: 2022-06-10 | Stop reason: SDUPTHER

## 2022-06-10 NOTE — TELEPHONE ENCOUNTER
HYDROcodone-acetaminophen (NORCO) 5-325 MG per tablet [[id number]]  ENDED    Order Details  Dose: 1 tablet Route: Oral Frequency: 4 TIMES DAILY PRN for Pain   Dispense Quantity: 120 tablet Refills: 0    Note to Pharmacy: Reduce doses taken as pain becomes manageable         Sig: Take 1 tablet by mouth 4 times daily as needed for Pain for up to 30 days.      Pharmacy    [facility] [id number] - [id number] [facility] [address] [phone] [facility] Sender [phone]   [address] [address]   Phone:  [phone]  Fax:  [phone]

## 2022-06-10 NOTE — TELEPHONE ENCOUNTER
Medication:   Requested Prescriptions     Pending Prescriptions Disp Refills    HYDROcodone-acetaminophen (NORCO) 5-325 MG per tablet 120 tablet 0     Sig: Take 1 tablet by mouth 4 times daily as needed for Pain for up to 30 days. Last Filled:  6/10/2022    Patient Phone Number: [phone] (home) [phone] (work)    Last appt: 5/9/2022   Next appt: 6/21/2022    Last OARRS:   RX Monitoring 6/1/2021   Attestation -   Periodic Controlled Substance Monitoring Possible medication side effects, risk of tolerance/dependence & alternative treatments discussed.      PDMP Monitoring:    Last PDMP Danae Rizvi as Reviewed [facility]):  Review User Review Instant Review Result   Yoon HANNA 4/21/2022  4:20 PM Reviewed PDMP [1]     Preferred Pharmacy:   [facility] [id number] - [id number] [phone] Damaris Nelson [phone]  [address] [place] [address]  Phone: [phone] Fax: [phone]    [address] [facility] - Suzie Gustavonayan [phone] - F [phone]  [address] [place] [address]  Phone: [phone] Fax: [phone]

## 2022-06-10 NOTE — TELEPHONE ENCOUNTER
Medication:   Requested Prescriptions     Pending Prescriptions Disp Refills    HYDROcodone-acetaminophen (NORCO) 5-325 MG per tablet 120 tablet 0     Sig: Take 1 tablet by mouth 4 times daily as needed for Pain for up to 30 days. Last Filled:  4/21/22    Patient Phone Number: [phone] (home) [phone] (work)    Last appt: 5/9/2022   Next appt: 6/21/2022    Last OARRS:   RX Monitoring 6/1/2021   Attestation -   Periodic Controlled Substance Monitoring Possible medication side effects, risk of tolerance/dependence & alternative treatments discussed.      PDMP Monitoring:    Last PDMP Janet Miller as Reviewed [facility]):  Review User Review Instant Review Result   Joseph Villarrealnancy 4/21/2022  4:20 PM Reviewed PDMP [1]     Preferred Pharmacy:     Arlene Mahmood [id number] - [id number] [facility] [phone] Raina Ward [phone]  [address] [place] [address]  Phone: [phone] Fax: [phone]

## 2022-06-16 RX ORDER — TOPIRAMATE 50 MG/1
TABLET, FILM COATED ORAL
Qty: 360 TABLET | Refills: 1 | Status: SHIPPED | OUTPATIENT
Start: 2022-06-16 | End: 2022-10-10 | Stop reason: SDUPTHER

## 2022-06-16 RX ORDER — TORSEMIDE 10 MG/1
TABLET ORAL
Qty: 135 TABLET | Refills: 1 | Status: SHIPPED | OUTPATIENT
Start: 2022-06-16 | End: 2022-08-03

## 2022-06-22 ENCOUNTER — CARE COORDINATION (OUTPATIENT)
Dept: CARE COORDINATION | Age: 82
End: 2022-06-22

## 2022-06-22 SDOH — ECONOMIC STABILITY: FOOD INSECURITY: WITHIN THE PAST 12 MONTHS, YOU WORRIED THAT YOUR FOOD WOULD RUN OUT BEFORE YOU GOT MONEY TO BUY MORE.: NEVER TRUE

## 2022-06-22 SDOH — ECONOMIC STABILITY: HOUSING INSECURITY
IN THE LAST 12 MONTHS, WAS THERE A TIME WHEN YOU DID NOT HAVE A STEADY PLACE TO SLEEP OR SLEPT IN A SHELTER (INCLUDING NOW)?: NO

## 2022-06-22 SDOH — HEALTH STABILITY: PHYSICAL HEALTH: ON AVERAGE, HOW MANY DAYS PER WEEK DO YOU ENGAGE IN MODERATE TO STRENUOUS EXERCISE (LIKE A BRISK WALK)?: 0 DAYS

## 2022-06-22 SDOH — ECONOMIC STABILITY: INCOME INSECURITY: IN THE LAST 12 MONTHS, WAS THERE A TIME WHEN YOU WERE NOT ABLE TO PAY THE MORTGAGE OR RENT ON TIME?: NO

## 2022-06-22 SDOH — ECONOMIC STABILITY: TRANSPORTATION INSECURITY
IN THE PAST 12 MONTHS, HAS THE LACK OF TRANSPORTATION KEPT YOU FROM MEDICAL APPOINTMENTS OR FROM GETTING MEDICATIONS?: NO

## 2022-06-22 SDOH — ECONOMIC STABILITY: HOUSING INSECURITY: IN THE LAST 12 MONTHS, HOW MANY PLACES HAVE YOU LIVED?: 1

## 2022-06-22 SDOH — ECONOMIC STABILITY: TRANSPORTATION INSECURITY
IN THE PAST 12 MONTHS, HAS LACK OF TRANSPORTATION KEPT YOU FROM MEETINGS, WORK, OR FROM GETTING THINGS NEEDED FOR DAILY LIVING?: NO

## 2022-06-22 SDOH — ECONOMIC STABILITY: FOOD INSECURITY: WITHIN THE PAST 12 MONTHS, THE FOOD YOU BOUGHT JUST DIDN'T LAST AND YOU DIDN'T HAVE MONEY TO GET MORE.: NEVER TRUE

## 2022-06-22 SDOH — HEALTH STABILITY: PHYSICAL HEALTH: ON AVERAGE, HOW MANY MINUTES DO YOU ENGAGE IN EXERCISE AT THIS LEVEL?: 0 MIN

## 2022-06-22 ASSESSMENT — SOCIAL DETERMINANTS OF HEALTH (SDOH)
HOW HARD IS IT FOR YOU TO PAY FOR THE VERY BASICS LIKE FOOD, HOUSING, MEDICAL CARE, AND HEATING?: NOT HARD AT ALL
HOW OFTEN DO YOU ATTENT MEETINGS OF THE CLUB OR ORGANIZATION YOU BELONG TO?: NEVER
HOW OFTEN DO YOU GET TOGETHER WITH FRIENDS OR RELATIVES?: THREE TIMES A WEEK
HOW OFTEN DO YOU ATTEND CHURCH OR RELIGIOUS SERVICES?: NEVER
DO YOU BELONG TO ANY CLUBS OR ORGANIZATIONS SUCH AS CHURCH GROUPS UNIONS, FRATERNAL OR ATHLETIC GROUPS, OR SCHOOL GROUPS?: NO
IN A TYPICAL WEEK, HOW MANY TIMES DO YOU TALK ON THE PHONE WITH FAMILY, FRIENDS, OR NEIGHBORS?: THREE TIMES A WEEK

## 2022-06-22 ASSESSMENT — LIFESTYLE VARIABLES
HOW OFTEN DO YOU HAVE A DRINK CONTAINING ALCOHOL: MONTHLY OR LESS
HOW MANY STANDARD DRINKS CONTAINING ALCOHOL DO YOU HAVE ON A TYPICAL DAY: 1 OR 2

## 2022-06-22 NOTE — CARE COORDINATION
Ambulatory Care Coordination Note  6/22/2022  CM Risk Score: 7  Charlson 10 Year Mortality Risk Score: 100%     ACC: Leesa Mendez RN    Summary Note: ACM made outreach to patient to complete enrollment into Care Management from [facility] and for [facility] Summer Outreach \"Tuck Me In\". ACM discussed COPD with patient. Patient was unaware that she had diagnosis of COPD. She is not currently prescribed rescue or control inhalers. She is familiar with COPD and s/sx due to her daughter having COPD. She states that she does not have any current concerns associated with COPD and plans to discuss diagnosis with PCP at her next follow up. Patient states that she has had history of pneumonia and gets the pneumonia shot as directed but other than pneumonia she is not aware of any pulmonary issues. ACM discussed Fall Risk with patient. She has history of falls. She did have a fall with injury this year. Per patient she has used extra care when walking since her latest fall. She states that she rarely feels unsteady on her feet. She uses a walker to ambulate d/t hip pain. She walks with a limp and has leg edema that causes impaired gait. Patient states that she is aware of limitations with walking and takes her time and ambulates cautiously. She keeps her environment well lit, clear of clutter, all rugs are secure, no cords or wires that present trip hazards, she keeps all commonly used items with in reach to prevent the need for bending and climbing. She has a claw that she uses if she does need something out of reach. She has bars in the restroom for safety. She lives in a ranch style home and does not have any stairs. Patient states that at night she has trouble sleeping due to hip pain. She states that the pain is chronic. She has been seen and treated for the pain in the past. Patient changes positions as needed, she takes a pain pill at night as needed which helps her get some sleep.  ACM discussed heat or ice therapy - per patient she has tried along with other suggestions from providers but has not found anything that works. Patient states that the pain is tolerable, but makes sleeping hard at times. She will discuss with PCP if patient worsens or changes. Patient had recent follow up with nephrologist. She was advised to check BP and weight daily. Patient is following provider instructions. Her BP remains normal and weight is stable. Her most recent weight was 120. She was given guidelines for torsemide and is taking as directed. She continues to have leg edema but denies in new or worsening symptoms. She is aware of s/sx to monitor, when to report concerns to provider and RED flags that warrant a ED visit. Heart Failure Education outreach Date/Time: 2022 11:15 AM    Ambulatory Care Manager (ACM) contacted the patient by telephone to perform Ambulatory Care Coordination. Verified name and  with patient as identifiers. Provided introduction to self, and explanation of the Ambulatory Care Manager's role. ACM reviewed that a Health Healthy tips for the Summer packet has been sent to [employer]. ACM reviewed healthy tips packet with the patient. Instructed patient to call their Cardiologist if they have a weight gain of 3 lbs in 2 days or 5 lbs in a week. Patient reminded that there is a physician on call 24 hours a day / 7 days a week should the patient have questions or concerns. The patient verbalized understanding. ACM discussed outreach with patient and reviewed handouts included in My Chart message. Patient will have daughter assist with getting on to her my chart to review further. Patient was given number to my chart help desk if patient's daughter is unable to help patient get back onto her my chart. Patient closely monitors her s/sx of CHF and is following recommendations of nephrologist and Cardiologist to monitor current leg edema and taking her water pill as prescribed.  Patient monitors her weight daily and is able to verbalize when to report concerns or changes to provider. She had no new s/sx at the time of the call. She had no questions for ACM during outreach. ACM provided contact information and encouraged patient to call with any non emergent questions or concerns. Plan  F/U CHF, Leg Edema    Ambulatory Care Coordination Assessment    Care Coordination Protocol  Referral from Primary Care Provider: No  Week 1 - Initial Assessment     Do you have all of your prescriptions and are they filled?: Yes (Comment: updated all information 6/22/2022 )  Barriers to medication adherence: None  Are you able to afford your medications?: Yes  How often do you have trouble taking your medications the way you have been told to take them?: I always take them as prescribed. Do you have Home O2 Therapy?: No      Ability to seek help/take action for Emergent Urgent situations i.e. fire, crime, inclement weather or health crisis. : Independent  Ability to ambulate to restroom: Independent  Ability handle personal hygeine needs (bathing/dressing/grooming): Independent  Ability to manage Medications: Independent  Ability to prepare Food Preparation: Independent  Ability to maintain home (clean home, laundry): Needs Assistance  Ability to drive and/or has transportation: Dependent  Ability to do shopping: Needs Assistance  Ability to manage finances: Independent  Is patient able to live independently?: Yes     Current Housing: Private Residence        Per the Fall Risk Screening, did the patient have 2 or more falls or 1 fall with injury in the past year?: Yes  How often do you think you are about to fall and you do NOT fall?  For example, you grab something to stabilize yourself or hold onto a wall/furniture?: Occasionally, Rarely  Use of a Mobility Aid: Yes  Difficulty walking/impaired gait: Yes  Issues with feet or shoes like numbness, edema, shoes not fitting: Yes  Changes in vision, poor vision or poor lighting in environment: No  Dizziness: No  Other Fall Risk: No     Frequent urination at night?: No  Do you use rails/bars?: Yes  Do you have a non-slip tub mat?: Yes     Are you experiencing loss of meaning?: No  Are you experiencing loss of hope and peace?: No     Thinking about your patient's physical health needs, are there any symptoms or problems (risk indicators) you are unsure about that require further investigation?: No identified areas of uncertainly or problems already being investigated   Are the patients physical health problems impacting on their mental well-being?: No identified areas of concern   Are there any problems with your patients lifestyle behaviors (alcohol, drugs, diet, exercise) that are impacting on physical or mental well-being?: No identified areas of concern   Do you have any other concerns about your patients mental well-being?  How would you rate their severity and impact on the patient?: No identified areas of concern   How would you rate their home environment in terms of safety and stability (including domestic violence, insecure housing, neighbor harassment)?: Consistently safe, supportive, stable, no identified problems   How do daily activities impact on the patient's well-being? (include current or anticipated unemployment, work, caregiving, access to transportation or other): No identified problems or perceived positive benefits   How would you rate their social network (family, work, friends)?: Good participation with social networks   How would you rate their financial resources (including ability to afford all required medical care)?: Financially secure, resources adequate, no identified problems   How wells does the patient now understand their health and well-being (symptoms, signs or risk factors) and what they need to do to manage their health?: Reasonable to good understanding and already engages in managing health or is willing to undertake better management   How well do you think your patient can engage in healthcare discussions? (Barriers include language, deafness, aphasia, alcohol or drug problems, learning difficulties, concentration): Clear and open communication, no identified barriers   Do other services need to be involved to help this patient?: Other care/services not required at this time   Are current services involved with this patient well-coordinated? (Include coordination with other services you are now recommendation): All required care/services in place and well-coordinated   Suggested Interventions and Community Resources  Fall Risk Prevention: In Process Zone Management Tools: In Process                  Prior to Admission medications    Medication Sig Start Date End Date Taking? Authorizing Provider   torsemide (DEMADEX) 10 MG tablet TAKE 1 TABLET EVERY OTHER DAY ALTERNATING WITH  2  TABLETS EVERY OTHER DAY 6/16/22   Anastacia Cruz MD   topiramate (TOPAMAX) 50 MG tablet TAKE 2 TABLETS TWICE DAILY 6/16/22   Anastacia Cruz MD   HYDROcodone-acetaminophen (NORCO) 5-325 MG per tablet Take 1 tablet by mouth 4 times daily as needed for Pain for up to 30 days.  6/10/22 7/10/22  Anastacia Cruz MD   cefdinir (OMNICEF) 300 MG capsule  3/15/22   Historical Provider, MD   cholestyramine light 4 g POWD Take 1 packet by mouth daily 5/9/22   Anastacia Cruz MD   DULoxetine (CYMBALTA) 30 MG extended release capsule Take 1 capsule by mouth daily  Patient not taking: Reported on 4/12/2022 3/4/22   Anastacia Cruz MD   vitamin D (ERGOCALCIFEROL) 1.25 MG (24630 UT) CAPS capsule TAKE 1 CAPSULE BY MOUTH ONCE A WEEK 2/28/22   Anastacia Cruz MD   allopurinol (ZYLOPRIM) 100 MG tablet Take 1 tablet by mouth daily 2/8/22   Anastacia Cruz MD   losartan (COZAAR) 50 MG tablet Take 1 tablet by mouth daily 2/8/22   Anastacia Cruz MD   nitroGLYCERIN (NITROLINGUAL) 0.4 MG/SPRAY 0.4 mg spray Place 1 spray under the tongue every 5 minutes as needed for Chest pain 2/8/22 Ivonne López MD   levothyroxine (SYNTHROID) 125 MCG tablet Take 1 tablet by mouth Daily 1/26/22   Ivonne López MD   fluticasone [facility]) 50 MCG/ACT nasal spray 2 sprays by Each Nostril route daily 1/25/22   TIFFANIE Wiggins - CNP   apixaban (ELIQUIS) 2.5 MG TABS tablet Take 1 tablet by mouth 2 times daily 1/12/22   Medina Kwan MD   metoprolol succinate (TOPROL XL) 25 MG extended release tablet Take 0.5 tablets by mouth daily . 5 tablet daily 12/6/21   TIFFANIE Bianchi - CNP   acetaminophen (TYLENOL) 325 MG tablet Take 1,000 mg by mouth daily  4/5/21   Historical Provider, MD   betamethasone valerate (VALISONE) 0.1 % cream Apply topically 2 times daily. Patient not taking: Reported on 4/12/2022 6/29/21   Ivonne López MD       Future Appointments   Date Time Provider Triston Hastingsi   6/28/2022  2:00 PM Ivonne López MD [address]   7/5/2022  2:00 PM Tory Fong APRN - [facility] [place] CARDIO [facility]   7/20/2022 10:30 AM [facility] CARDIAC CATH LAB PRE/POST ROOM [facility] CATH [facility]   9/20/2022 11:00 AM Baron Pedro MD AFL [facility] AFL [facility]     , ACC: Simba Boogie RN  CM Risk Score: 7  Charlson 10 Year Mortality Risk Score: 100%     Care Coordination Interventions    Referral from Primary Care Provider: No  Suggested Interventions and Community Resources  Fall Risk Prevention: In Process  Zone Management Tools:  In Process (Comment: Discussed CHF Summer Outreach Zone Handout provided for patient reference)       ,   Congestive Heart Failure Assessment    Are you currently restricting fluids?: 1800cc  Do you understand a low sodium diet?: Yes  Do you understand how to read food labels?: Yes  How many restaurant meals do you eat per week?: 0  Do you salt your food before tasting it?: No         Symptoms:  CHF associated leg swelling: Pos      Symptom course: stable  Patient-reported weight (lb): 120  Weight trend: stable  Salt intake watch compared to last visit: stable     , COPD Assessment    Does the patient understand envrionmental exposure?: Yes  Is the patient able to verbalize Rescue vs. Long Acting medications?: No  Does the patient have a nebulizer?: No  Does the patient use a space with inhaled medications?: No            Symptoms:  None: Yes      Symptom course: stable  Increase use of rapid acting/rescue inhaled medications?: No  Change in chronic cough?: No/At Baseline  Change in sputum?: No/At Baseline     ,   General Assessment    Do you have any symptoms that are causing concern?: Yes  Progression since Onset: Intermittent - Waxing/Waning  Reported Symptoms: Pain (Comment: hip)     , Care Coordination Episodes    Type: Amb Care Management  Episode: Complex Care  Noted: 5/27/2022 and 7

## 2022-06-28 ENCOUNTER — OFFICE VISIT (OUTPATIENT)
Dept: FAMILY MEDICINE CLINIC | Age: 82
End: 2022-06-28
Payer: MEDICARE

## 2022-06-28 VITALS
BODY MASS INDEX: 21.68 KG/M2 | TEMPERATURE: 98.1 F | SYSTOLIC BLOOD PRESSURE: 106 MMHG | WEIGHT: 122.38 LBS | OXYGEN SATURATION: 96 % | DIASTOLIC BLOOD PRESSURE: 68 MMHG | RESPIRATION RATE: 12 BRPM | HEART RATE: 71 BPM

## 2022-06-28 DIAGNOSIS — G89.29 CHRONIC RIGHT SACROILIAC PAIN: ICD-10-CM

## 2022-06-28 DIAGNOSIS — J44.9 CHRONIC OBSTRUCTIVE PULMONARY DISEASE, UNSPECIFIED COPD TYPE (HCC): ICD-10-CM

## 2022-06-28 DIAGNOSIS — M53.3 CHRONIC RIGHT SACROILIAC PAIN: ICD-10-CM

## 2022-06-28 DIAGNOSIS — N18.32 STAGE 3B CHRONIC KIDNEY DISEASE (HCC): ICD-10-CM

## 2022-06-28 DIAGNOSIS — I50.43 ACUTE ON CHRONIC COMBINED SYSTOLIC AND DIASTOLIC CONGESTIVE HEART FAILURE (HCC): Primary | ICD-10-CM

## 2022-06-28 PROBLEM — N18.4 CKD (CHRONIC KIDNEY DISEASE) STAGE 4, GFR 15-29 ML/MIN (HCC): Status: ACTIVE | Noted: 2022-06-28

## 2022-06-28 PROCEDURE — G8399 PT W/DXA RESULTS DOCUMENT: HCPCS | Performed by: FAMILY MEDICINE

## 2022-06-28 PROCEDURE — G8420 CALC BMI NORM PARAMETERS: HCPCS | Performed by: FAMILY MEDICINE

## 2022-06-28 PROCEDURE — 3023F SPIROM DOC REV: CPT | Performed by: FAMILY MEDICINE

## 2022-06-28 PROCEDURE — 1090F PRES/ABSN URINE INCON ASSESS: CPT | Performed by: FAMILY MEDICINE

## 2022-06-28 PROCEDURE — 1036F TOBACCO NON-USER: CPT | Performed by: FAMILY MEDICINE

## 2022-06-28 PROCEDURE — G8427 DOCREV CUR MEDS BY ELIG CLIN: HCPCS | Performed by: FAMILY MEDICINE

## 2022-06-28 PROCEDURE — 1123F ACP DISCUSS/DSCN MKR DOCD: CPT | Performed by: FAMILY MEDICINE

## 2022-06-28 PROCEDURE — 99214 OFFICE O/P EST MOD 30 MIN: CPT | Performed by: FAMILY MEDICINE

## 2022-06-28 RX ORDER — LEVOTHYROXINE SODIUM 0.12 MG/1
125 TABLET ORAL DAILY
Qty: 90 TABLET | Refills: 1 | Status: SHIPPED | OUTPATIENT
Start: 2022-06-28

## 2022-06-28 NOTE — PROGRESS NOTES
Subjective:      Patient ID: Abbe Kawasaki is a 80 y.o. female. CC: Patient presents for re-evaluation of chronic health problems including possible pneumonia, COPD, kidney disease and persistent right back pain. HPI patient presents in accompaniment of daughter. Pt is here for a follow up, med refill, test results, and will like to discuss coughing for over a week now. Pt has concern with developing pneumonia again. Daughter thinks she might be getting congestive heart failure. She was evaluated by kidney specialist several weeks ago and torsemide was changed to 10 mg alternating with 20 mg every other day. She feels the leg swelling has improved to some extent. Weight seems to be stable at home. The right hip and low back pain is persistent nature. Loose bowel problems have resolved since last visit. Patient does use a walker for ambulation and has not had any falls. Patient is very compliant with medications with no adverse reactions.     Review of Systems  Patient Active Problem List   Diagnosis    Mixed hyperlipidemia    Chronic gouty arthropathy    Acquired hypothyroidism    Non-rheumatic mitral regurgitation    COPD (chronic obstructive pulmonary disease) (HCC)    Restrictive lung disease    Sick sinus syndrome (HCC)    Allergic rhinitis    Fibrocystic breast    Cerebrovascular accident (CVA) ([address]    HTN (hypertension), benign    Pacemaker    PAT (paroxysmal atrial tachycardia) (HCC)    Primary osteoarthritis involving multiple joints    Vitamin D deficiency    Tremor    Chronic total occlusion of native coronary artery    Age related osteoporosis    Chronic systolic heart failure (HCC)    Stage 3 chronic kidney disease ([address]    PAD (peripheral artery disease) ([address]    Atherosclerosis of native arteries of extremities with intermittent claudication, bilateral legs (HCC)    Idiopathic peripheral neuropathy    Ischemic cardiomyopathy    Dizziness    Peripheral vertigo, bilateral    PAF (paroxysmal atrial fibrillation) (HCC)    Dyslipidemia    Iron deficiency anemia    Anemia    Bilateral sensorineural hearing loss    Memory loss due to medical condition    Symptomatic bradycardia    Pacemaker lead failure    Presence of Watchman left atrial appendage closure device    Chronic right sacroiliac pain    Postcholecystectomy diarrhea       Outpatient Medications Marked as Taking for the 6/28/22 encounter (Office Visit) with Kim Castaneda MD   Medication Sig Dispense Refill    torsemide (DEMADEX) 10 MG tablet TAKE 1 TABLET EVERY OTHER DAY ALTERNATING WITH  2  TABLETS EVERY OTHER  tablet 1    topiramate (TOPAMAX) 50 MG tablet TAKE 2 TABLETS TWICE DAILY 360 tablet 1    HYDROcodone-acetaminophen (NORCO) 5-325 MG per tablet Take 1 tablet by mouth 4 times daily as needed for Pain for up to 30 days. 120 tablet 0    cefdinir (OMNICEF) 300 MG capsule       cholestyramine light 4 g POWD Take 1 packet by mouth daily 231 g 2    vitamin D (ERGOCALCIFEROL) 1.25 MG (19493 UT) CAPS capsule TAKE 1 CAPSULE BY MOUTH ONCE A WEEK 12 capsule 1    allopurinol (ZYLOPRIM) 100 MG tablet Take 1 tablet by mouth daily 90 tablet 1    losartan (COZAAR) 50 MG tablet Take 1 tablet by mouth daily 90 tablet 1    nitroGLYCERIN (NITROLINGUAL) 0.4 MG/SPRAY 0.4 mg spray Place 1 spray under the tongue every 5 minutes as needed for Chest pain 4.9 g 1    levothyroxine (SYNTHROID) 125 MCG tablet Take 1 tablet by mouth Daily 90 tablet 1    fluticasone (FLONASE) 50 MCG/ACT nasal spray 2 sprays by Each Nostril route daily 16 g 0    apixaban (ELIQUIS) 2.5 MG TABS tablet Take 1 tablet by mouth 2 times daily 180 tablet 1    metoprolol succinate (TOPROL XL) 25 MG extended release tablet Take 0.5 tablets by mouth daily . 5 tablet daily 45 tablet 0    acetaminophen (TYLENOL) 325 MG tablet Take 1,000 mg by mouth daily          Allergies   Allergen Reactions    Aspirin Nausea Only    Diltiazem Anaphylaxis    Diltiazem Hcl Anaphylaxis    Lorazepam Other (See Comments)     hallucinations  hallucinations  hallucinations    Sulfa Antibiotics Rash and Hives    Atorvastatin Other (See Comments)     Muscle pains  Muscle pains  Muscle pains    Dabigatran Nausea Only     And indigestion  And indigestion    Aka Pradaxa  And indigestion  And indigestion  And indigestion    Aka Pradaxa  And indigestion  And indigestion  And indigestion    Aka Pradaxa    Mysoline [Primidone]     Nsaids      Other reaction(s): Unknown    Other Other (See Comments)     Nitroglycerin patches causes severe headaches    Primidone      Other reaction(s): Unknown       Social History     Tobacco Use    Smoking status: Former Smoker     Packs/day: 1.00     Years: 28.00     Pack years: 28.00     Types: Cigarettes     Quit date: 1987     Years since quittin.5    Smokeless tobacco: Never Used    Tobacco comment: H.O.smoking at age 15 / smoked up to 1 p.p.d / quit    Substance Use Topics    Alcohol use: Not Currently     Alcohol/week: 0.0 standard drinks       /68 (Site: Left Upper Arm, Position: Sitting, Cuff Size: Medium Adult)   Pulse 71   Temp 98.1 °F (36.7 °C) (Infrared)   Resp 12   Wt 122 lb 6 oz (55.5 kg)   SpO2 96%   BMI 21.68 kg/m²     Objective:   Physical Exam  Constitutional:       General: She is not in acute distress. Appearance: She is well-developed. Neck:      Vascular: No carotid bruit. Cardiovascular:      Rate and Rhythm: Normal rate and regular rhythm. Pulses:           Dorsalis pedis pulses are 2+ on the right side and 2+ on the left side. Posterior tibial pulses are 2+ on the right side and 2+ on the left side. Heart sounds: Murmur (Left sternal border) heard. Systolic murmur is present with a grade of 2/6. No friction rub. No gallop. Pulmonary:      Effort: Pulmonary effort is normal.      Breath sounds: Normal breath sounds.    Musculoskeletal: General: Normal range of motion. Right hip: Tenderness present. Normal range of motion. Left hip: No tenderness. Normal range of motion. Right lower leg: Edema (1 plus ankle edema bilaterally) present. Left lower leg: Edema present. Neurological:      Mental Status: She is alert and oriented to person, place, and time. Sensory: Sensation is intact. Motor: Motor function is intact. Psychiatric:         Behavior: Behavior is cooperative. Assessment:      Huseyin Obrien was seen today for follow-up and hyperlipidemia. Diagnoses and all orders for this visit:    Acute on chronic combined systolic and diastolic congestive heart failure (HCC)    Chronic obstructive pulmonary disease, unspecified COPD type ([facility]    Stage 3b chronic kidney disease ([facility]    Chronic right sacroiliac pain    Other orders  -     levothyroxine (SYNTHROID) 125 MCG tablet; Take 1 tablet by mouth Daily            Plan:      Reviewed labs and test results with patient. Clinically she has increasing congestive heart failure and recommend increasing Demadex to 20 mg daily for the next week and then resume the 10 mg alternating with 20 mg. Patient was encouraged to continue with low-salt diet and avoid nephrotoxic medications. Maintain current medications otherwise. Keep cardiology appointment    Patient received counseling on the following healthy behaviors: nutrition and exercise     Patient given educational materials     Health maintenance updated    Discussed use, benefit, and side effects of prescribed medications. Barriers to medication compliance addressed. All patient questions answered. Pt voiced understanding. Patient needs RTC in 4 months. Medical decision making of moderate complexity. Please note that this chart was generated using Dragon dictation software.  Although every effort was made to ensure the accuracy of this automated transcription, some errors in transcription may have occurred.

## 2022-06-29 ENCOUNTER — OFFICE VISIT (OUTPATIENT)
Dept: CARDIOLOGY CLINIC | Age: 82
End: 2022-06-29
Payer: MEDICARE

## 2022-06-29 VITALS
BODY MASS INDEX: 21.62 KG/M2 | SYSTOLIC BLOOD PRESSURE: 106 MMHG | OXYGEN SATURATION: 97 % | HEART RATE: 72 BPM | RESPIRATION RATE: 20 BRPM | HEIGHT: 63 IN | WEIGHT: 122 LBS | DIASTOLIC BLOOD PRESSURE: 72 MMHG

## 2022-06-29 DIAGNOSIS — I48.20 CHRONIC ATRIAL FIBRILLATION (HCC): ICD-10-CM

## 2022-06-29 DIAGNOSIS — N28.9 RENAL INSUFFICIENCY: ICD-10-CM

## 2022-06-29 DIAGNOSIS — I50.22 CHRONIC SYSTOLIC HEART FAILURE (HCC): Primary | ICD-10-CM

## 2022-06-29 DIAGNOSIS — I25.810 CORONARY ARTERY DISEASE INVOLVING AUTOLOGOUS ARTERY CORONARY BYPASS GRAFT WITHOUT ANGINA PECTORIS: ICD-10-CM

## 2022-06-29 PROCEDURE — 1036F TOBACCO NON-USER: CPT | Performed by: CLINICAL NURSE SPECIALIST

## 2022-06-29 PROCEDURE — 1090F PRES/ABSN URINE INCON ASSESS: CPT | Performed by: CLINICAL NURSE SPECIALIST

## 2022-06-29 PROCEDURE — G8420 CALC BMI NORM PARAMETERS: HCPCS | Performed by: CLINICAL NURSE SPECIALIST

## 2022-06-29 PROCEDURE — G8399 PT W/DXA RESULTS DOCUMENT: HCPCS | Performed by: CLINICAL NURSE SPECIALIST

## 2022-06-29 PROCEDURE — G8427 DOCREV CUR MEDS BY ELIG CLIN: HCPCS | Performed by: CLINICAL NURSE SPECIALIST

## 2022-06-29 PROCEDURE — 1123F ACP DISCUSS/DSCN MKR DOCD: CPT | Performed by: CLINICAL NURSE SPECIALIST

## 2022-06-29 PROCEDURE — 99214 OFFICE O/P EST MOD 30 MIN: CPT | Performed by: CLINICAL NURSE SPECIALIST

## 2022-06-29 NOTE — PROGRESS NOTES
[facility]  Progress Note    Primary Care Doctor:  Shivam Spivey MD    Chief Complaint   Patient presents with    3 Month Follow-Up    Hyperlipidemia    Hypertension    Coronary Artery Disease        History of Present Illness:  80 y.o. female with history of Ms. Jaenie Stauffer She has a history of atrial fibrillation (on xarelto), TIA, chronic kidney disease, CHF, hypertension, hyperlipidemia, mitral valve disease, hypothyroidism. Kortney Marie last LVEF was 45% on 7/19/19. CABG, 7/08 cath- patent LIMA to LAD, SVG occluded to RCA; 8/2019 left to right fem-fem bypass and left fem to above knee popliteal bypass  Pacemaker generator change 1/25/21  Watchman implanted 5/17/2021    I had the pleasure of seeing Kaci Love in follow up for systolic heart failure. She is in a wheelchair and with her daughter. She fell again trying to lift her dog into the bed. She is having some swelling in both her ankles (normally holds it in her abdomen). Her weight is up about 3 pounds. She denies any chest pain, palpitations, lightheadedness or increased shortness of breath.   She is anxious about her DONA in July    Past Medical History:   has a past medical history of Allergic rhinitis, cause unspecified, Arthritis, Atrial fibrillation ([address] Bronchopneumonia, CAD (coronary artery disease), Cerebral artery occlusion with cerebral infarction ([address] Chronic gouty arthropathy, Chronic kidney disease, Congestive heart failure, unspecified, Degeneration of cervical intervertebral disc, Essential and other specified forms of tremor, Gout, HIGH CHOLESTEROL, History of CVA (cerebrovascular accident), Hx of blood clots, Hypertension, Influenza, Leakage of Watchman left atrial appendage closure device, Mitral valve stenosis and aortic valve insufficiency, Movement disorder, Pacemaker, Peptic ulcer, unspecified site, unspecified as acute or chronic, without mention of hemorrhage, perforation, or obstruction, Thyroid disease, Unspecified disorder of kidney and ureter, and Unspecified hypothyroidism. Surgical History:   has a past surgical history that includes back surgery; Cholecystectomy; Cardiac surgery; Coronary artery bypass graft (1987); shoulder surgery; Cardiac catheterization (7/2012); hip surgery (Left, 03/16/2017); joint replacement; Cardiac catheterization (08/07/2018); Percutaneous Transluminal Coronary Angio (11/04/2014); pr njx aa&/strd tfrml epi lumbar/sacral 1 level (Right, 12/3/2018); Femoral-femoral Bypass Graft (N/A, 8/22/2019); femoral bypass (Left, 8/22/2019); and IR KYPHOPLASTY LUMBAR 1 VERTEBRAL BODY (5/14/2021). Social History:   reports that she quit smoking about 35 years ago. Her smoking use included cigarettes. She has a 28.00 pack-year smoking history. She has never used smokeless tobacco. She reports previous alcohol use. She reports that she does not use drugs. Family History:   Family History   Problem Relation Age of Onset    Cancer Sister     Heart Disease Sister     Diabetes Brother     Hypertension Brother     Heart Disease Brother     Stroke Maternal Aunt     Heart Disease Maternal Aunt     Diabetes Maternal Uncle     Hypertension Paternal Aunt     Cancer Maternal Grandmother     Cancer Paternal Grandmother     Rheum Arthritis Neg Hx     Lupus Neg Hx        Home Medications:  Prior to Admission medications    Medication Sig Start Date End Date Taking? Authorizing Provider   levothyroxine (SYNTHROID) 125 MCG tablet Take 1 tablet by mouth Daily 6/28/22  Yes Mariama Franklin MD   torsemide (DEMADEX) 10 MG tablet TAKE 1 TABLET EVERY OTHER DAY ALTERNATING WITH  2  TABLETS EVERY OTHER DAY 6/16/22  Yes Mariama Franklin MD   topiramate (TOPAMAX) 50 MG tablet TAKE 2 TABLETS TWICE DAILY 6/16/22  Yes Mariama Franklin MD   HYDROcodone-acetaminophen (NORCO) 5-325 MG per tablet Take 1 tablet by mouth 4 times daily as needed for Pain for up to 30 days.  6/10/22 7/10/22 Yes Mariama Franklin MD   cholestyramine light 4 g POWD Take 1 packet by mouth daily 5/9/22  Yes Maria Antonia Mendoza MD   vitamin D (ERGOCALCIFEROL) 1.25 MG (16857 UT) CAPS capsule TAKE 1 CAPSULE BY MOUTH ONCE A WEEK 2/28/22  Yes Maria Antonia Mendoza MD   allopurinol (ZYLOPRIM) 100 MG tablet Take 1 tablet by mouth daily 2/8/22  Yes Maria Antonia Mendoza MD   nitroGLYCERIN (NITROLINGUAL) 0.4 MG/SPRAY 0.4 mg spray Place 1 spray under the tongue every 5 minutes as needed for Chest pain 2/8/22  Yes Maria Antonia Mendoza MD   fluticasone [facility]) 50 MCG/ACT nasal spray 2 sprays by Each Nostril route daily 1/25/22  Yes TIFFANIE Nguyen CNP   apixaban (ELIQUIS) 2.5 MG TABS tablet Take 1 tablet by mouth 2 times daily 1/12/22  Yes Vanesa Arriola MD   metoprolol succinate (TOPROL XL) 25 MG extended release tablet Take 0.5 tablets by mouth daily . 5 tablet daily 12/6/21  Yes TIFFANIE Barth CNP   acetaminophen (TYLENOL) 325 MG tablet Take 1,000 mg by mouth daily  4/5/21  Yes Historical Provider, MD        Allergies:  Aspirin, Diltiazem, Diltiazem hcl, Lorazepam, Sulfa antibiotics, Atorvastatin, Dabigatran, Mysoline [primidone], Nsaids, Other, and Primidone     Review of Systems:   · Constitutional: there has been no unanticipated weight loss. There's been no change in energy level, sleep pattern, or activity level. · Eyes: No visual changes or diplopia. No scleral icterus. · ENT: No Headaches, hearing loss or vertigo. No mouth sores or sore throat. · Cardiovascular: Reviewed in HPI  · Respiratory: No cough or wheezing, no sputum production. No hematemesis. · Gastrointestinal: No abdominal pain, appetite loss, blood in stools. No change in bowel or bladder habits. States she feels bloated   · Genitourinary: No dysuria, trouble voiding, or hematuria. · Musculoskeletal:  No gait disturbance, weakness or joint complaints. · Integumentary: No rash or pruritis.   · Neurological: No headache, diplopia, change in muscle strength, numbness or tingling. No change in gait, balance, coordination, mood, affect, memory, mentation, behavior. · Psychiatric: No anxiety, no depression. · Endocrine: No malaise, fatigue or temperature intolerance. No excessive thirst, fluid intake, or urination. No tremor. · Hematologic/Lymphatic: No abnormal bruising or bleeding, blood clots or swollen lymph nodes. · Allergic/Immunologic: No nasal congestion or hives. Physical Examination:    Vitals:    06/29/22 1259   BP: 106/72   Site: Left Upper Arm   Position: Sitting   Cuff Size: Medium Adult   Pulse: 72   Resp: 20   SpO2: 97%   Weight: 122 lb (55.3 kg)   Height: 5' 3\" (1.6 m)        Constitutional and General Appearance: Warm and dry, no apparent distress, normal coloration  HEENT:  Normocephalic, atraumatic  Respiratory:  · Normal excursion and expansion without use of accessory muscles  · Resp Auscultation: Normal breath sounds without dullness  Cardiovascular:  · The apical impulses not displaced  · Heart tones are crisp and normal  · JVP normal H2O  · regular rhythm  · Peripheral pulses are symmetrical and full  · There is no clubbing, cyanosis of the extremities.   · Bilateral lower ankle edema  · Pedal Pulses: 2+ and equal   Abdomen:  · No masses or tenderness  · Liver/Spleen: No Abnormalities Noted  Neurological/Psychiatric:  · Alert and oriented in all spheres  · Moves all extremities well  · Exhibits normal gait balance and coordination  · No abnormalities of mood, affect, memory, mentation, or behavior are noted    Lab Data:    CBC:   Lab Results   Component Value Date    WBC 9.7 03/17/2022    WBC 7.5 01/24/2022    WBC 7.1 01/23/2022    RBC 4.50 06/08/2022    RBC 5.46 03/17/2022    RBC 5.09 01/24/2022    HGB 14.5 03/17/2022    HGB 13.5 01/24/2022    HGB 13.5 01/23/2022    HCT 47.4 03/17/2022    HCT 42.2 01/24/2022    HCT 42.5 01/23/2022    MCV 85.2 06/08/2022    MCV 86.8 03/17/2022    MCV 82.9 01/24/2022    RDW 21.3 06/08/2022    RDW 22.3 03/17/2022    RDW 21.0 01/24/2022     03/17/2022     01/24/2022     01/23/2022     BMP:  Lab Results   Component Value Date     06/08/2022     03/17/2022     01/24/2022    K 3.9 06/08/2022    K 4.5 03/17/2022    K 4.0 01/24/2022    K 4.5 01/22/2022    K 4.9 12/16/2020    K 4.2 11/07/2020     06/08/2022     03/17/2022     01/24/2022    CO2 23 06/08/2022    CO2 21 03/17/2022    CO2 20 01/24/2022    PHOS 3.2 06/08/2022    PHOS 4.4 03/17/2022    PHOS 4.1 12/20/2021    BUN 33 06/08/2022    BUN 63 03/17/2022    BUN 24 01/24/2022    CREATININE 2.0 03/17/2022    CREATININE 1.3 01/24/2022    CREATININE 1.3 01/23/2022     BNP:   Lab Results   Component Value Date    PROBNP 4,196 01/22/2022    PROBNP 3,042 01/05/2022    PROBNP 6,300 08/16/2021     Cardiac Imaging:  Echo 1/12/2022  Summary   Normal left ventricle size, wall thickness, and systolic function with an   estimated ejection fraction of 55-60%. Mitral valve leaflets appear moderately thickened. The posterior leaflet   appears severely restricted. Moderate mitral regurgitation is present. Mitral annular calcification. Left atrial size is dilated. There is a Watchman device seated with a   feliciano-device leak measured at 2 mm on the anterior side. There is a small an echogenic mass on the surface of the device, consistent   with thrombus near the coumadin ridge. DONA 7/7/2021  Summary   Ejection fraction is visually estimated to be 45-50 %. Mild thickening of anterior, posterior leaflet of mitral valve. There is decreased leaflet motion and \"hockey stick\" appearance of the   mitral leaflets. posterior leaflet is immobile. Mean Gradient 4 mmHg. Moderate mitral regurgitation is present. There is no evidence of mass or thrombus in the left atrium or appendage. Watchman in place. No thrombus. <3 mm leak. The left atrium is dilated. A PFO is present. Aortic valve leaflets appear thickened.    Tricuspid aortic valve. Mild aortic regurgitation is present. Plaque is noted in the thoracic aorta. Moderate tricuspid regurgitation. PASP 51 mmHg    [facility] 3/19/2021 Dr Gracie Carmona  Artery Findings/Result   LM Normal   LAD 50% ostial, 50% mid instent   Cx 50% mid at OM1 bifurcation, 30% OM1 mid at bifurcation   RI NA   % prox with multiple R-R collaterals   LVEDP 8   LVG NA due to renal failure      Intervention:         None     Post Cath Dx:       Stable CAD  Possible angina from  of RCA - poor candidate for  intervention with renal failure    Echo 4/30/2020   Left ventricular cavity size is normal. Normal left ventricular wall   thickness. Left ventricular function appears to be reduced with an estimated   ejection fraction of 45%. Diffuse hypokinesis. Echo 7/19/2019   Summary    Left ventricle - normal size, thickness, reduced function with EF of 45%  LV wall motion - diffuse hypokinesis. Mitral valve - thickened, annular calcification, moderate regurgitation  Aortic valve - thickened, calcified, mild regurgitation  Tricuspid valve - mild regurgitation with PASP of 25mmHg  Pulmonic valve - trivial regurgitation   Biatrial enlargement  Pacemaker / ICD lead is visualized in the right heart. 6/2019 Dr Gracie Carmona  NSTEMI: . Angiogram findings were as below:      Findings:                      LM       Normal              LAD     50% ostial, mid 100%              Cx        40% OM1 at bifurcation              RCA     Mid 100%              LVG     Not performed              EDP     15 mmHg              L-LAD  Patent              S-PDA Known occluded  Severe Ca++:None  Post Cath Dx:Stable CAD, no apparent culprit for NSTEMI  Intervention:  None  Med Rec:        Continue aggressive med tx                          Continue plavix, no asa due to allergy. statin added. LDL 75   8/7/18  The PCI of complex proximal RCA chronic total occlusion was unsuccessful (J- score 3).   Underwent successful Angioplasty of high-grade proximal RCA lesion proximal to the . RECOMMENDATIONS:  Attempt on this complex long  was unsuccessful. Lack   Of retrograde collaterals along with a very ambiguous cap as well as a large RV marginal branch and bridging collateral coming off at the cap makes it a  challenging repeat attempt. If she continues to have angina, it is recommended to proceed with the single-vessel SVG to the RCA. Echo: 2/17/16 ([facility])  Conclusions: Normal LV size and systolic function Mild to moderate mitral  regurgitation Mild tricuspid regurgitation  Findings:   Left Atrium: Mild to moderate enlargement of the left atrium. Left Ventricle: Upper limits of normal left ventricle size. No left ventricular  hypertrophy. Normal left ventricular systolic function. The ejection fraction   Is visually estimated to be 55 %. Right Atrium: Normal right atrial size. RightVentricle: Normal right ventricle size. Normal right ventricular function. Aortic Valve: Tri-leaflet aortic valve. Mild aortic cusp calcification. No  aortic valve stenosis. Mild (1+) aortic valve regurgitation. Aorta: No dilatation of the aortic root. IVC/PA: Normal IVC size and normal respiratory  collapse consistent with normal right atrial pressure. Mitral Valve: Mild mitral valve leaflet calcification. Mild to moderate (2+) mitral valve  regurgitation. No mitral valve stenosis. Tricuspid Valve: Mild (1+) tricuspid  regurgitation. The pulmonary artery pressure is normal.   Pulmonic Valve: Mild  pulmonic regurgitation. Pericardium: Normal pericardium with no significant  pericardial effusion. Assessment:    1. Chronic systolic heart failure (HCC) with improved LVEF on bb and arb; no aldosterone antagonist due to renal insuff   2. Chronic atrial fibrillation s/p watchman Dr Janet Davila   3. Coronary artery disease involving autologous artery coronary bypass graft without angina pectoris    4.       Renal insufficiency follows with  [facility]    Plan:     1. Continue all current medications  2. Increase torsemide for a few days, goal is weight of 119  3. Keep legs elevated if sitting in chair  4.   RTO in 6 months at [facility]    I appreciate the opportunity of cooperating in the care of this individual.    TIFFANIE Galaviz - CNS, CNS, 6/29/2022, 3:00 PM

## 2022-06-29 NOTE — PATIENT INSTRUCTIONS
1.  Continue all current medications  2. Increase torsemide for a few days, goal is weight of 119  3. Keep legs elevated if sitting in chair  4.   RTO in 6 months at [address] [place] [address]

## 2022-07-13 ENCOUNTER — CARE COORDINATION (OUTPATIENT)
Dept: CARE COORDINATION | Age: 82
End: 2022-07-13

## 2022-07-13 RX ORDER — ALLOPURINOL 100 MG/1
TABLET ORAL
Qty: 90 TABLET | Refills: 1 | Status: SHIPPED | OUTPATIENT
Start: 2022-07-13

## 2022-07-13 NOTE — TELEPHONE ENCOUNTER
Medication:   Requested Prescriptions     Pending Prescriptions Disp Refills    allopurinol (ZYLOPRIM) 100 MG tablet [Pharmacy Med Name: ALLOPURINOL 100 MG Tablet] 90 tablet 1     Sig: TAKE 1 TABLET EVERY DAY      Last Filled:     Patient Phone Number: [phone] (home) [phone] (work)    Last appt: 6/28/2022   Next appt: 10/28/2022    Last OARRS:   RX Monitoring 6/1/2021   Attestation -   Periodic Controlled Substance Monitoring Possible medication side effects, risk of tolerance/dependence & alternative treatments discussed.      PDMP Monitoring:    Last PDMP Nirali Mini as Reviewed [facility]):  Review User Review Instant Review Result   Ami Sidney HANNA 4/21/2022  4:20 PM Reviewed PDMP [1]     Preferred Pharmacy:   [facility] [id number] - [id number], [address] [place] [address] [phone] [place] [phone]  [address] [place] [address]  Phone: [phone] Fax: [phone]    [address] [facility] (Now [address] [facility] [facility]) - Lina Larsen [phone] - F [phone]  [address] [place] [address]  Phone: [phone] Fax: [phone]

## 2022-07-13 NOTE — CARE COORDINATION
Ambulatory Care Coordination Note  7/13/2022  CM Risk Score: 7  Charlson 10 Year Mortality Risk Score: 100%     ACC: Ghazala Bland, RN    Summary Note: ACM made outreach to patient to follow up with Care Management. Patient states that she has no current concerns with chronic conditions CHF or COPD. She denies any s/sx or RED flag concerns. Patient had difficulty with edema to the lower extremities but per patient that is better now with the changes that were made to her medications. Patient continues to monitor her blood pressure daily. She states that her readings remain below the goal of <130/80s. She will continue to monitor and report concerns to appropriate provider. She states that her kidney doctor is very pleased with her results. She is staying hydrated and avoiding nephrotoxic medications. Patient complains of hoarseness. Per patient it has been an ongoing issue for about a year. She states that lately it is constant as before it would come and go. ACM discussed scheduling appointment with Dr. Bailey Acuña for further investigation. Patient declined to make appointment at this time but states that if it continues she will. ACM discussed avoiding foods that make the hoarseness worse, do not whisper when talking, drink water to keep throat moist, use a vaporizer or humidifier, try throat lozenges, and/or gargle with salt water. [facility] has sent the patient reference - Hoarseness: Care Instructions to patient via my chart. Plan  F/U CHF  F/U Hoarseness      Lab Results     None          Care Coordination Interventions    Referral from Primary Care Provider: No  Suggested Interventions and Community Resources  Fall Risk Prevention: In Process  Zone Management Tools: In Process (Comment: Discussed CHF Summer Outreach Zone Handout provided for patient reference)         Goals Addressed    None         Prior to Admission medications    Medication Sig Start Date End Date Taking?  Authorizing Provider   allopurinol (ZYLOPRIM) 100 MG tablet TAKE 1 TABLET EVERY DAY 7/13/22   Lisha Mullen MD   levothyroxine (SYNTHROID) 125 MCG tablet Take 1 tablet by mouth Daily 6/28/22   Lisha Mullen MD   torsemide (DEMADEX) 10 MG tablet TAKE 1 TABLET EVERY OTHER DAY ALTERNATING WITH  2  TABLETS EVERY OTHER DAY 6/16/22   Lisha Mullen MD   topiramate (TOPAMAX) 50 MG tablet TAKE 2 TABLETS TWICE DAILY 6/16/22   Lisha Mullen MD   cholestyramine light 4 g POWD Take 1 packet by mouth daily 5/9/22   Lisha Mullen MD   vitamin D (ERGOCALCIFEROL) 1.25 MG (52609 UT) CAPS capsule TAKE 1 CAPSULE BY MOUTH ONCE A WEEK 2/28/22   Lisha Mullen MD   nitroGLYCERIN (NITROLINGUAL) 0.4 MG/SPRAY 0.4 mg spray Place 1 spray under the tongue every 5 minutes as needed for Chest pain 2/8/22   Lisha Mullen MD   fluticasone [facility]) 50 MCG/ACT nasal spray 2 sprays by Each Nostril route daily 1/25/22   TIFFANIE Amezcua - CNP   apixaban (ELIQUIS) 2.5 MG TABS tablet Take 1 tablet by mouth 2 times daily 1/12/22   Nakul Puentes MD   metoprolol succinate (TOPROL XL) 25 MG extended release tablet Take 0.5 tablets by mouth daily . 5 tablet daily 12/6/21   TIFFANIE Colin CNP   acetaminophen (TYLENOL) 325 MG tablet Take 1,000 mg by mouth daily  4/5/21   Historical Provider, MD       Future Appointments   Date Time Provider Triston Ford   7/20/2022 10:30 AM [facility] CARDIAC CATH LAB PRE/POST ROOM [facility] CATH [facility]   9/20/2022 11:00 AM Michi Hernandez MD [facility]   10/28/2022  1:00 PM Lisha Mullen MD [facility] [place] - DYD   12/6/2022  2:30 PM TIFFANIE Hagen CARDIO MMA     ,   Congestive Heart Failure Assessment    Are you currently restricting fluids?: 1800cc  Do you understand a low sodium diet?: Yes  Do you understand how to read food labels?: Yes  How many restaurant meals do you eat per week?: 0  Do you salt your food before tasting it?: No         Symptoms:  None: Yes      Symptom course: stable     ,   COPD Assessment    Does the patient understand envrionmental exposure?: Yes  Is the patient able to verbalize Rescue vs. Long Acting medications?: No  Does the patient have a nebulizer?: No  Does the patient use a space with inhaled medications?: No            Symptoms:  None: Yes      Symptom course: stable  Change in chronic cough?: No/At Baseline  Change in sputum?: No/At Baseline  Self Monitoring - SaO2: No     ,   General Assessment    Do you have any symptoms that are causing concern?: Yes  Progression since Onset: Intermittent - Waxing/Waning  Reported Symptoms:  (Comment: hoarseness)     , Care Coordination Episodes    Type: Amb Care Management  Episode: Complex Care  Noted: 5/27/2022 and 7

## 2022-07-15 DIAGNOSIS — M15.9 PRIMARY OSTEOARTHRITIS INVOLVING MULTIPLE JOINTS: ICD-10-CM

## 2022-07-15 RX ORDER — HYDROCODONE BITARTRATE AND ACETAMINOPHEN 5; 325 MG/1; MG/1
1 TABLET ORAL 4 TIMES DAILY PRN
Qty: 120 TABLET | Refills: 0 | Status: SHIPPED | OUTPATIENT
Start: 2022-07-15 | End: 2022-08-22 | Stop reason: SDUPTHER

## 2022-07-15 NOTE — TELEPHONE ENCOUNTER
Medication:   Requested Prescriptions     Pending Prescriptions Disp Refills    HYDROcodone-acetaminophen (NORCO) 5-325 MG per tablet 120 tablet 0     Sig: Take 1 tablet by mouth 4 times daily as needed for Pain for up to 30 days. Last Filled:  6/10/2022    Patient Phone Number: [phone] (home) [phone] (work)    Last appt: 6/28/2022   Next appt: 10/28/2022    Last OARRS:   RX Monitoring 6/1/2021   Attestation -   Periodic Controlled Substance Monitoring Possible medication side effects, risk of tolerance/dependence & alternative treatments discussed.      PDMP Monitoring:    Last PDMP Harris Medeiros as Reviewed [facility]):  Review User Review Instant Review Result   Verito HANNA 4/21/2022  4:20 PM Reviewed PDMP [1]     Preferred Pharmacy:   [facility] [id number] - [facility] [phone] [place] [phone]  [address] [place] [address]  Phone: [phone] Fax: [phone]    [address] [facility] (Now [address] [employer] [facility] Mail Delivery) - Lina Larsen [phone] - F [phone]  [address] [place] [address]  Phone: [phone] Fax: [phone]

## 2022-07-15 NOTE — TELEPHONE ENCOUNTER
Lewis Kiki [phone] (home) [phone] (work)   is requesting refill(s) of  HYDROcodone-acetaminophen      to preferred pharmacy  [facility] [address]  Last OV  6/28/22  Last refill  6/10/22

## 2022-07-20 ENCOUNTER — HOSPITAL ENCOUNTER (OUTPATIENT)
Dept: CARDIAC CATH/INVASIVE PROCEDURES | Age: 82
Discharge: HOME | End: 2022-07-20
Attending: INTERNAL MEDICINE | Admitting: INTERNAL MEDICINE
Payer: MEDICARE

## 2022-07-20 VITALS
OXYGEN SATURATION: 100 % | HEIGHT: 63 IN | BODY MASS INDEX: 21.62 KG/M2 | RESPIRATION RATE: 16 BRPM | HEART RATE: 70 BPM | DIASTOLIC BLOOD PRESSURE: 62 MMHG | WEIGHT: 122 LBS | SYSTOLIC BLOOD PRESSURE: 136 MMHG

## 2022-07-20 LAB
LV EF: 58 %
LVEF MODALITY: NORMAL

## 2022-07-20 PROCEDURE — 93312 ECHO TRANSESOPHAGEAL: CPT

## 2022-07-20 PROCEDURE — 7100000010 HC PHASE II RECOVERY - FIRST 15 MIN

## 2022-07-20 PROCEDURE — 99152 MOD SED SAME PHYS/QHP 5/>YRS: CPT

## 2022-07-20 PROCEDURE — 93320 DOPPLER ECHO COMPLETE: CPT

## 2022-07-20 PROCEDURE — 93325 DOPPLER ECHO COLOR FLOW MAPG: CPT

## 2022-07-20 RX ORDER — ASPIRIN 81 MG/1
81 TABLET ORAL DAILY
Qty: 90 TABLET | Refills: 1 | Status: SHIPPED | OUTPATIENT
Start: 2022-07-20 | End: 2022-10-17 | Stop reason: ALTCHOICE

## 2022-07-20 RX ORDER — SODIUM CHLORIDE 0.9 % (FLUSH) 0.9 %
5-40 SYRINGE (ML) INJECTION PRN
Status: DISCONTINUED | OUTPATIENT
Start: 2022-07-20 | End: 2022-07-20 | Stop reason: HOSPADM

## 2022-07-20 RX ORDER — CLOPIDOGREL BISULFATE 75 MG/1
75 TABLET ORAL DAILY
Qty: 30 TABLET | Refills: 3 | Status: SHIPPED | OUTPATIENT
Start: 2022-07-20

## 2022-07-20 NOTE — H&P
[address]   Electrophysiology Follow up   Date: 7/20/2022  I had the privilege of visiting Ras Culp in the office. CC: Nikki echodensity  HPI: Ras Culp is a 80 y.o. female with a history of paroxysmal atrial fib and underwent cardioversion in 2008. She had recurrent Atrial fib and underwent RFCA on 4/24/09 by Dr. Marcus Acuña. .    Underwent dual chamber pacemaker [place]) on 11/8/13 for sick sinus syndrome , and AV block reported at [facility] 1490.       She is s/p  generator change out on 01/25/2021. Noted atrial lead issues, tried to repair the lead which was not effective and had a new RA lead inserted. She has significant Coronary disease with history of CABG and multiple interventions. On 02/02/2017 she had a LHC which showed chronic occlusion of the LAD and has seen interventional cardiology for potential interventions. She was seen on 08/02/2017 and reported having paloitations and chest pain for one months. ECG was done which showed atrial flutter irate in the 140's. She is anticoagulated with Xarelto. She underwent Cardioversion 08/02/2017. She had a PFT that showed severe restrictive disease and she has not followed up with Pulmonology. She was encouraged to see Dr. Kg Verma. She was a previous smoker with suspected COPD and amiodarone is not an option for rate control. She is followed by heart failure. She has had recurrent falls. 12/16/2020  Admission at [facility] for concerns of left kidney contusion s/p fall. Underwent Watchman procedure on 5/17/2021. Follow up DONA with less than 3 mm feliciano-device leak. She is here for follow up DONA. DONA 1/12/2022 showed  There is a small an echogenic mass on the surface of the device, consistent   with thrombus near the coumadin ridge. Marely Manuel presents to the office in follow up. She does not have any cardiac complaints at this time.  She does have some bruising from her Eliquis therapy but has remained compliant on this. She has a bad hip and has caused her to not be very active d/t this. We discussed her DONA in detail and need for repeat DONA around 7/12/2022. 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 On Eliquis 2.5 mg BID   Tolerating well. Discussed fall precautions. Will repeat DONA around 7/12/2022    Paroxysmal atrial fibrillation    ECG today shows SR, 1st degree AV block    Patient has high [id number] score 6 and requires anticoagulation to prevent thromboembolic events. [id number] Score: 5   S/p Watchman device in 5/2021. Follow up DONA 7/7/2021 with less than 3 mm leak. DONA 1/12/2022 showed  There is a small an echogenic mass on the surface of the device, consistent   with thrombus near the coumadin ridge. Continues on Eliquis 2.5 mg BID       Recurrent atrial flutter/tachycardia/fibrillation              Antiarrythmic therapy is limited since she has extensive structural heart disease. Amiodarone is not indicated because she has severe abnormal PFT. S/p DCCV 08/02/2017                S/p RFCA for Afib at [facility] by Dr. Nora Aguilar 2009                Toprol XL 25 mg BID              Xarelto 10 mg Creatinine Clearance 27 ml/min              High [id number] score and high risk for stroke and thromboembolism  . Shortness of Breath/ Severe COPD               PFT 2018 shows severe restrictive lung disease              ECHO 04/30/2020 LVEF 45 %     Bradycardia/Sinus node dysfunction              S/p dual chamber pacemaker, generator change with new RA lead 01/25/2021   The CIED was interrogated and programmed and I supervised and reviewed all the data. All findings and changes are in device interrogation sheet and reflect my personal interpretation and changes and is scanned to Epic. CAD              Stable               On BB. Allergy ASA      HTN  -Controlled  -BP goal <130/80  -Home BP monitoring encouraged, printed information provided on how to accurately measure BP at home.  -Counseled to follow a low salt diet to assure blood pressure remains controlled for cardiovascular risk factor modification.   -The patient is counseled to get regular exercise 3-5 times per week and maintain a healthy weight reduce cardiovascular risk factors.          Chronic Systolic Heart failure    Followed by heart failure   Echo 04/30/2020 LVEF 45 %   Aldactone 12.5 mg 3 times weekly,Toprol XL BID       Patient Active Problem List    Diagnosis Date Noted    CKD (chronic kidney disease) stage 4, GFR 15-29 ml/min ([facility]) 06/28/2022    Chronic right sacroiliac pain 05/09/2022    Postcholecystectomy diarrhea 05/09/2022    Pacemaker 04/24/2017    Cerebrovascular accident (CVA) ([address]     HTN (hypertension), benign     Fibrocystic breast 03/03/2017    Allergic rhinitis 05/26/2015    Sick sinus syndrome ([address] 11/19/2013    COPD (chronic obstructive pulmonary disease) ([address] 02/14/2013    Restrictive lung disease 02/14/2013    Non-rheumatic mitral regurgitation 07/20/2012    Chronic gouty arthropathy     Acquired hypothyroidism     Mixed hyperlipidemia 05/16/2011    Presence of Watchman left atrial appendage closure device 05/17/2021    Symptomatic bradycardia     Pacemaker lead failure     Memory loss due to medical condition 09/09/2020    Bilateral sensorineural hearing loss 07/15/2020    Iron deficiency anemia 04/30/2020    Anemia 04/30/2020    Peripheral vertigo, bilateral     PAF (paroxysmal atrial fibrillation) ([address]     Dyslipidemia     Dizziness 02/06/2020    Ischemic cardiomyopathy 12/04/2019    Idiopathic peripheral neuropathy 11/22/2019    Atherosclerosis of native arteries of extremities with intermittent claudication, bilateral legs ([facility])     Stage 3 chronic kidney disease ([address] 07/22/2019    PAD (peripheral artery disease) ([address] 07/22/2019    Chronic systolic heart failure ([address] 12/28/2018    Age related osteoporosis 05/01/2018    Chronic total occlusion of native coronary artery 04/12/2018    Tremor 11/08/2017    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/10/2017    Vitamin D deficiency 08/10/2017    PAT (paroxysmal atrial tachycardia) [facility] 08/08/2017     Diagnostic studies:   ECG 4/12/22  SR  423 QTcH, 103 QRS    DONA 1/12/2022   Summary   Normal left ventricle size, wall thickness, and systolic function with an   estimated ejection fraction of 55-60%. Mitral valve leaflets appear moderately thickened. The posterior leaflet   appears severely restricted. Moderate mitral regurgitation is present. Mitral annular calcification. Left atrial size is dilated. There is a Watchman device seated with a   feliciano-device leak measured at 2 mm on the anterior side. There is a small an echogenic mass on the surface of the device, consistent   with thrombus near the coumadin ridge. Mild aortic regurgitation. The thoracic aorta has moderate-severe plaque. Moderate tricuspid regurgitation. ECHO 04/30/2020   Left ventricular cavity size is normal. Normal left ventricular wall   thickness. Left ventricular function appears to be reduced with an estimated   ejection fraction of 45%. Diffuse hypokinesis. Echo 7/19/19   Summary     Left ventricle - normal size, thickness, reduced function with EF of 45%     LV wall motion - diffuse hypokinesis. Mitral valve - thickened, annular calcification, moderate regurgitation     Aortic valve - thickened, calcified, mild regurgitation     Tricuspid valve - mild regurgitation with PASP of 25mmHg     Pulmonic valve - trivial regurgitation     Biatrial enlargement     Pacemaker / ICD lead is visualized in the right heart. I independently reviewed the cardiac diagnostic studies, ECG and relevant imaging studies.       [facility] 05/20/2019  Findings:                      LM       Normal              LAD     50% ostial, mid 100%              Cx 40% OM1 at bifurcation              RCA     Mid 100%              LVG     Not performed              EDP     15 mmHg              L-LAD  Patent              S-PDA Known occluded  Severe Ca++:None  Post Cath Dx:Stable CAD, no apparent culprit for NSTEMI  Intervention:  None  Med Rec:        Continue aggressive med tx                          Continue plavix, no asa due      Cath: 2/3/17 showed 100% prox RCA with bridging collateral, 100% SVG to RCA, 100% prox LAD after D1, D1 stent widely patent, Mild Cx disease, Normal LV FXN--EF 60%--EDP 12 post hydration. Stable anatomy        Nuc stress 6/28/16: ([facility])   Final Conclusions/Summary  Stress ECG Impressions: No significant ST changes during vasodilator infusion  Summary: - Abnormal moderat risk stress test with moderate size and severity  anterolateral wall ischemia - Normal LV size and systolic function         I independently reviewed the cardiac diagnostic studies, ECG and relevant imaging studies. Lab Results   Component Value Date    LVEF 58 01/12/2022    LVEFMODE Echo 07/19/2019     Lab Results   Component Value Date    TSH 0.90 03/25/2021         Physical Examination:  Vitals:    07/20/22 0928   BP: 136/62   Pulse: 70   Resp: 16   SpO2: 100%      Wt Readings from Last 3 Encounters:   07/20/22 122 lb (55.3 kg)   06/29/22 122 lb (55.3 kg)   06/28/22 122 lb 6 oz (55.5 kg)       Constitutional: Oriented. No distress. Head: Normocephalic and atraumatic. Mouth/Throat: Oropharynx is clear and moist.   Eyes: Conjunctivae normal. EOM are normal.   Neck: Neck supple. No JVD present. Cardiovascular: Normal rate, regular rhythm, S1&S2. Pulmonary/Chest: Bilateral respiratory sounds. No rhonchi. Abdominal: Soft. No tenderness. Musculoskeletal: No tenderness. No edema    Lymphadenopathy: Has no cervical adenopathy. Neurological: Alert and oriented. Follows command, No Gross deficit   Skin: Skin is warm, No rash noted.    Psychiatric: Has a normal behavior       Review of System:  [x] Full ROS obtained and negative except as mentioned in HPI    Prior to Admission medications    Medication Sig Start Date End Date Taking? Authorizing Provider   HYDROcodone-acetaminophen (NORCO) 5-325 MG per tablet Take 1 tablet by mouth 4 times daily as needed for Pain for up to 30 days. 7/15/22 8/14/22  Kim Castaneda MD   allopurinol (ZYLOPRIM) 100 MG tablet TAKE 1 TABLET EVERY DAY 7/13/22   Kim Castaneda MD   levothyroxine (SYNTHROID) 125 MCG tablet Take 1 tablet by mouth Daily 6/28/22   Kim Castaneda MD   torsemide (DEMADEX) 10 MG tablet TAKE 1 TABLET EVERY OTHER DAY ALTERNATING WITH  2  TABLETS EVERY OTHER DAY 6/16/22   Kim Castaneda MD   topiramate (TOPAMAX) 50 MG tablet TAKE 2 TABLETS TWICE DAILY 6/16/22   Kim Castaneda MD   cholestyramine light 4 g POWD Take 1 packet by mouth daily 5/9/22   Kim Castaneda MD   vitamin D (ERGOCALCIFEROL) 1.25 MG (56942 UT) CAPS capsule TAKE 1 CAPSULE BY MOUTH ONCE A WEEK 2/28/22   Kim Castaneda MD   nitroGLYCERIN (NITROLINGUAL) 0.4 MG/SPRAY 0.4 mg spray Place 1 spray under the tongue every 5 minutes as needed for Chest pain 2/8/22   Kim Castaneda MD   fluticasone [facility]) 50 MCG/ACT nasal spray 2 sprays by Each Nostril route daily 1/25/22   Mikeella Showers TIFFANIE Lind CNP   apixaban (ELIQUIS) 2.5 MG TABS tablet Take 1 tablet by mouth 2 times daily 1/12/22   Miguel Cole MD   metoprolol succinate (TOPROL XL) 25 MG extended release tablet Take 0.5 tablets by mouth daily . 5 tablet daily 12/6/21   Craige Holstein, APRN - CNP   acetaminophen (TYLENOL) 325 MG tablet Take 1,000 mg by mouth daily  4/5/21   Historical Provider, MD       Allergies   Allergen Reactions    Aspirin Nausea Only    Diltiazem Anaphylaxis    Diltiazem Hcl Anaphylaxis    Lorazepam Other (See Comments)     hallucinations  hallucinations  hallucinations    Sulfa Antibiotics Rash and Hives    Atorvastatin Other (See Comments) Muscle pains  Muscle pains  Muscle pains    Dabigatran Nausea Only     And indigestion  And indigestion    Aka Pradaxa  And indigestion  And indigestion  And indigestion    Aka Pradaxa  And indigestion  And indigestion  And indigestion    Aka Pradaxa    Mysoline [Primidone]     Nsaids      Other reaction(s): Unknown    Other Other (See Comments)     Nitroglycerin patches causes severe headaches    Primidone      Other reaction(s): Unknown       Social History:  Reviewed. reports that she quit smoking about 35 years ago. Her smoking use included cigarettes. She has a 28.00 pack-year smoking history. She has never used smokeless tobacco. She reports that she does not currently use alcohol. She reports that she does not use drugs. Family History:  Reviewed. Reviewed. No family history of SCD. Relevant and available labs, and cardiovascular diagnostics reviewed. Reviewed. I independently reviewed relevant and available cardiac diagnostic tests ECG, CXR, Echo, Stress test, Device interrogation, Holter, CT scan. Complex medical condition with multiple medical problems affecting prognosis and outcome of EP interventions      All questions and concerns were addressed to the patient/family. Alternatives to my treatment were discussed. I have discussed the above stated plan and the patient verbalized understanding and agreed with the plan. NOTE: This report was transcribed using voice recognition software. Every effort was made to ensure accuracy, however, inadvertent computerized transcription errors may be present. Caridad Brooke MD, MPH  [facility]   Office: [phone]  Fax: [phone]      H&P Update    I have reviewed the history and physical and examined the patient and updated with relevant changes.      Consent: I have discussed with the patient and/or the patient representative the indication, alternatives, and the possible risks and/or complications of the planned procedure and the anesthesia methods. The patient and/or patient representative appear to understand and agree to proceed. Vitals:    07/20/22 0928   BP: 136/62   Pulse: 70   Resp: 16   SpO2: 100%     Prior to Admission medications    Medication Sig Start Date End Date Taking? Authorizing Provider   HYDROcodone-acetaminophen (NORCO) 5-325 MG per tablet Take 1 tablet by mouth 4 times daily as needed for Pain for up to 30 days. 7/15/22 8/14/22  Kim Castaneda MD   allopurinol (ZYLOPRIM) 100 MG tablet TAKE 1 TABLET EVERY DAY 7/13/22   Kim Castaneda MD   levothyroxine (SYNTHROID) 125 MCG tablet Take 1 tablet by mouth Daily 6/28/22   Kim Castaneda MD   torsemide (DEMADEX) 10 MG tablet TAKE 1 TABLET EVERY OTHER DAY ALTERNATING WITH  2  TABLETS EVERY OTHER DAY 6/16/22   Kim Castaneda MD   topiramate (TOPAMAX) 50 MG tablet TAKE 2 TABLETS TWICE DAILY 6/16/22   Kim Castaneda MD   cholestyramine light 4 g POWD Take 1 packet by mouth daily 5/9/22   Kim Castaneda MD   vitamin D (ERGOCALCIFEROL) 1.25 MG (64844 UT) CAPS capsule TAKE 1 CAPSULE BY MOUTH ONCE A WEEK 2/28/22   Kim Castaneda MD   nitroGLYCERIN (NITROLINGUAL) 0.4 MG/SPRAY 0.4 mg spray Place 1 spray under the tongue every 5 minutes as needed for Chest pain 2/8/22   Kim Castaneda MD   fluticasone [facility]) 50 MCG/ACT nasal spray 2 sprays by Each Nostril route daily 1/25/22   TIFFANIE Negron CNP   apixaban (ELIQUIS) 2.5 MG TABS tablet Take 1 tablet by mouth 2 times daily 1/12/22   Miguel Cole MD   metoprolol succinate (TOPROL XL) 25 MG extended release tablet Take 0.5 tablets by mouth daily . 5 tablet daily 12/6/21   Craige Holstein, APRN - CNP   acetaminophen (TYLENOL) 325 MG tablet Take 1,000 mg by mouth daily  4/5/21   Historical Provider, MD     Past Medical History:   Diagnosis Date    Allergic rhinitis, cause unspecified     Arthritis     Atrial fibrillation ([facility]     Bronchopneumonia     CAD (coronary artery disease) stent:  post cataract surgery (CABG)    Cerebral artery occlusion with cerebral infarction [facility])     TIA    Chronic gouty arthropathy     Chronic kidney disease     Congestive heart failure, unspecified     Degeneration of cervical intervertebral disc     Essential and other specified forms of tremor     Gout     HIGH CHOLESTEROL     History of CVA (cerebrovascular accident)     Hx of blood clots     Hypertension     Influenza 12/23/2017    Leakage of Watchman left atrial appendage closure device     Mitral valve stenosis and aortic valve insufficiency     Movement disorder     back problems    Pacemaker     Peptic ulcer, unspecified site, unspecified as acute or chronic, without mention of hemorrhage, perforation, or obstruction     Thyroid disease     Unspecified disorder of kidney and ureter     Unspecified hypothyroidism      Past Surgical History:   Procedure Laterality Date    BACK SURGERY      CARDIAC CATHETERIZATION  7/2012    CARDIAC CATHETERIZATION  08/07/2018    Unsuccesful  of RCA    CARDIAC SURGERY      CABG & Cardiac ablation    CHOLECYSTECTOMY      CORONARY ARTERY BYPASS GRAFT  1987    LIMA- Diag/LAD, SVG- RCA    FEMORAL BYPASS Left 8/22/2019    LEFT FEMORAL TO POPLITEAL BYPASS GRAFT performed by Yancy Mena MD at [facility] GRAFT N/A 8/22/2019    FEMORAL TO FEMORAL BYPASS performed by Yancy Mena MD at [address] Left 03/16/2017    Left hip pinning    IR KYPHOPLASTY LUMBAR FIRST LEVEL  5/14/2021    IR KYPHOPLASTY LUMBAR FIRST LEVEL 5/14/2021 [facility] SPECIAL PROCEDURES    JOINT REPLACEMENT      UT NJX AA&/STRD TFRML EPI LUMBAR/SACRAL 1 LEVEL Right 12/3/2018    RIGHT L3 AND L4 LUMBAR TRANSFORAMINAL EPIRUAL STEROID INJECTION WITH FLUOROSCOPY performed by Aurelia Martinez MD at [facility]    PTCA  11/04/2014    MARLENY - 3.0 x 28 to the Ist Diag    SHOULDER SURGERY      left     Allergies   Allergen Reactions    Aspirin Nausea Only    Diltiazem Anaphylaxis Diltiazem Hcl Anaphylaxis    Lorazepam Other (See Comments)     hallucinations  hallucinations  hallucinations    Sulfa Antibiotics Rash and Hives    Atorvastatin Other (See Comments)     Muscle pains  Muscle pains  Muscle pains    Dabigatran Nausea Only     And indigestion  And indigestion    Aka Pradaxa  And indigestion  And indigestion  And indigestion    Aka Pradaxa  And indigestion  And indigestion  And indigestion    Aka Pradaxa    Mysoline [Primidone]     Nsaids      Other reaction(s): Unknown    Other Other (See Comments)     Nitroglycerin patches causes severe headaches    Primidone      Other reaction(s): Unknown       Pre-Sedation Documentation and Exam:   I have personally completed a history, physical exam & review of systems for this patient (see notes).     Mallampati Airway Assessment:  Class II     Prior History of Anesthesia Complications:   None    ASA Classification:  Class 2 - A normal healthy patient with mild systemic disease    Sedation/ Anesthesia Plan:   Intravenous sedation    Medications Planned:   Midazolam (Versed) and Fentanyl intravenously    Patient is an appropriate candidate for plan of sedation:   Yes    Electronically signed by Lucio Chavez MD on 7/20/2022 at 10:38 AM

## 2022-07-20 NOTE — PROCEDURES
[facility]     Electrophysiology Procedure Note       Date of Procedure: 7/20/2022  Patient's Name: Moisés Luo  YOB: 1940   Medical Record Number: [id number]  Procedure Performed by: Alfredo Reagan MD    Procedures performed:    Trans-esophageal echocardiography    Indication of the procedure: S/p Watchman device      Details of procedure: The patient was brought to the cath lab area in a fasting and non-sedated state. The risks, benefits and alternatives of the procedure were discussed with the patient. The patient opted to proceed with the procedure. Written informed consent was signed and placed in the chart. A timeout protocol was completed to identify the patient and the procedure being performed. IV sedation was provided with IV Versed, Fentanyl initially and DONA was performed     Preliminary DONA results are:   Watchman device well seated in the [place]   ~  3 mm feliciano device leak noted. No thrombus noted on the device. Can D/c Eliquis, start ASA and plavix for 6 months, followed by single antiplatelet therapy after. Patient tolerated the procedure and no immediate complications noted.      Alfredo Reagan MD, MPH  [facility]   Office: [phone]  Fax: [phone]

## 2022-07-20 NOTE — DISCHARGE INSTRUCTIONS
DONA DISCHARGE INSTRUCTIONS    No driving for 24 hours. We strongly recommend that a responsible adult stay with you for the next 24 hours. Continue Medications.     Advance diet as tolerated    Contact physician office  for following symptoms:  Fever  Difficulty swallowing  Chest pain  Difficulty breathing  Bleeding

## 2022-07-29 ENCOUNTER — TELEPHONE (OUTPATIENT)
Dept: CARDIOLOGY CLINIC | Age: 82
End: 2022-07-29

## 2022-07-29 NOTE — TELEPHONE ENCOUNTER
She had DONA for Watchman. I doubt this is related to her symptoms. Please gather more information from her, like her weight and if she has any leg swelling. I would bet her symptoms are CHF related and should be forwarded onto [facility] as she may need her torsemide increased.     Kanwal Sung, APRN-CNP

## 2022-07-29 NOTE — TELEPHONE ENCOUNTER
Pt called stating that she had an echo dory, cardiac cath on 07/28 she is experiencing sob at first it was a lot but now it has gotten a littler better. Per Pt she does not know what to do. Please call to discuss.  Thank you

## 2022-07-29 NOTE — TELEPHONE ENCOUNTER
Called pt about the message below and was not able to left message on machine as it asked for a \"assess code\".

## 2022-08-01 ENCOUNTER — TELEPHONE (OUTPATIENT)
Dept: CARDIOLOGY CLINIC | Age: 82
End: 2022-08-01

## 2022-08-01 NOTE — TELEPHONE ENCOUNTER
Pt called in stating that she isn't feeling well and she thinks that its due to her taking the generic Plavix. She is Sob haent been able to sleep and just doesn't feel well. She would like the be seen.  Pt can be reached at [phone]

## 2022-08-02 ENCOUNTER — HOSPITAL ENCOUNTER (OUTPATIENT)
Dept: ONCOLOGY | Age: 82
Setting detail: INFUSION SERIES
Discharge: HOME | End: 2022-08-02
Payer: MEDICARE

## 2022-08-02 ENCOUNTER — OFFICE VISIT (OUTPATIENT)
Dept: CARDIOLOGY CLINIC | Age: 82
End: 2022-08-02
Payer: MEDICARE

## 2022-08-02 VITALS
HEART RATE: 80 BPM | OXYGEN SATURATION: 100 % | BODY MASS INDEX: 21.44 KG/M2 | DIASTOLIC BLOOD PRESSURE: 76 MMHG | HEIGHT: 63 IN | SYSTOLIC BLOOD PRESSURE: 152 MMHG | WEIGHT: 121 LBS

## 2022-08-02 VITALS
SYSTOLIC BLOOD PRESSURE: 155 MMHG | DIASTOLIC BLOOD PRESSURE: 66 MMHG | TEMPERATURE: 96.8 F | HEART RATE: 69 BPM | RESPIRATION RATE: 16 BRPM

## 2022-08-02 DIAGNOSIS — I48.20 CHRONIC ATRIAL FIBRILLATION (HCC): ICD-10-CM

## 2022-08-02 DIAGNOSIS — I50.23 ACUTE ON CHRONIC SYSTOLIC HEART FAILURE, NYHA CLASS 3 (HCC): Primary | ICD-10-CM

## 2022-08-02 DIAGNOSIS — N28.9 RENAL INSUFFICIENCY: ICD-10-CM

## 2022-08-02 DIAGNOSIS — I25.810 CORONARY ARTERY DISEASE INVOLVING AUTOLOGOUS ARTERY CORONARY BYPASS GRAFT WITHOUT ANGINA PECTORIS: ICD-10-CM

## 2022-08-02 LAB
ANION GAP SERPL CALCULATED.3IONS-SCNC: 8 MMOL/L (ref 3–16)
BUN BLDV-MCNC: 25 MG/DL (ref 7–20)
CALCIUM SERPL-MCNC: 9.8 MG/DL (ref 8.3–10.6)
CHLORIDE BLD-SCNC: 113 MMOL/L (ref 99–110)
CO2: 23 MMOL/L (ref 21–32)
CREAT SERPL-MCNC: 1.2 MG/DL (ref 0.6–1.2)
GFR AFRICAN AMERICAN: 52
GFR NON-AFRICAN AMERICAN: 43
GLUCOSE BLD-MCNC: 115 MG/DL (ref 70–99)
HCT VFR BLD CALC: 44.3 % (ref 36–48)
HEMOGLOBIN: 13.5 G/DL (ref 12–16)
MCH RBC QN AUTO: 26.9 PG (ref 26–34)
MCHC RBC AUTO-ENTMCNC: 30.5 G/DL (ref 31–36)
MCV RBC AUTO: 88 FL (ref 80–100)
PDW BLD-RTO: 21.6 % (ref 12.4–15.4)
PLATELET # BLD: 349 K/UL (ref 135–450)
PMV BLD AUTO: 8.9 FL (ref 5–10.5)
POTASSIUM SERPL-SCNC: 4.6 MMOL/L (ref 3.5–5.1)
PRO-BNP: ABNORMAL PG/ML (ref 0–449)
RBC # BLD: 5.04 M/UL (ref 4–5.2)
SODIUM BLD-SCNC: 144 MMOL/L (ref 136–145)
WBC # BLD: 10.7 K/UL (ref 4–11)

## 2022-08-02 PROCEDURE — 6360000002 HC RX W HCPCS: Performed by: CLINICAL NURSE SPECIALIST

## 2022-08-02 PROCEDURE — 96374 THER/PROPH/DIAG INJ IV PUSH: CPT

## 2022-08-02 PROCEDURE — 85027 COMPLETE CBC AUTOMATED: CPT

## 2022-08-02 PROCEDURE — 80048 BASIC METABOLIC PNL TOTAL CA: CPT

## 2022-08-02 PROCEDURE — G8427 DOCREV CUR MEDS BY ELIG CLIN: HCPCS | Performed by: CLINICAL NURSE SPECIALIST

## 2022-08-02 PROCEDURE — 1090F PRES/ABSN URINE INCON ASSESS: CPT | Performed by: CLINICAL NURSE SPECIALIST

## 2022-08-02 PROCEDURE — 83880 ASSAY OF NATRIURETIC PEPTIDE: CPT

## 2022-08-02 PROCEDURE — G8420 CALC BMI NORM PARAMETERS: HCPCS | Performed by: CLINICAL NURSE SPECIALIST

## 2022-08-02 PROCEDURE — 99211 OFF/OP EST MAY X REQ PHY/QHP: CPT

## 2022-08-02 PROCEDURE — 1036F TOBACCO NON-USER: CPT | Performed by: CLINICAL NURSE SPECIALIST

## 2022-08-02 PROCEDURE — 93000 ELECTROCARDIOGRAM COMPLETE: CPT | Performed by: CLINICAL NURSE SPECIALIST

## 2022-08-02 PROCEDURE — 2580000003 HC RX 258: Performed by: CLINICAL NURSE SPECIALIST

## 2022-08-02 PROCEDURE — G8399 PT W/DXA RESULTS DOCUMENT: HCPCS | Performed by: CLINICAL NURSE SPECIALIST

## 2022-08-02 PROCEDURE — 99215 OFFICE O/P EST HI 40 MIN: CPT | Performed by: CLINICAL NURSE SPECIALIST

## 2022-08-02 PROCEDURE — 1123F ACP DISCUSS/DSCN MKR DOCD: CPT | Performed by: CLINICAL NURSE SPECIALIST

## 2022-08-02 RX ORDER — SODIUM CHLORIDE 0.9 % (FLUSH) 0.9 %
5-40 SYRINGE (ML) INJECTION 2 TIMES DAILY
Status: DISCONTINUED | OUTPATIENT
Start: 2022-08-02 | End: 2022-08-03 | Stop reason: HOSPADM

## 2022-08-02 RX ORDER — FUROSEMIDE 10 MG/ML
120 INJECTION INTRAMUSCULAR; INTRAVENOUS ONCE
Status: COMPLETED | OUTPATIENT
Start: 2022-08-02 | End: 2022-08-02

## 2022-08-02 RX ADMIN — FUROSEMIDE 120 MG: 10 INJECTION, SOLUTION INTRAMUSCULAR; INTRAVENOUS at 11:27

## 2022-08-02 RX ADMIN — SODIUM CHLORIDE, PRESERVATIVE FREE 20 ML: 5 INJECTION INTRAVENOUS at 11:28

## 2022-08-02 NOTE — PROGRESS NOTES
Pt to [place] from Cardiology office via wheelchair with daughter at side for lab draw, possible lasix injection. SBP 150s; same as in office. Pt reports SOB. IV access obtained; labs drawn. Results reviewerd by Bhupnedra Villavicencio NP. Order for Lasix IVP received. Pt/ daughter educated on med. Both v/u. Med administered. IV removed. Pt to bathroom via wheelchair prior to discharge home with daughter. Pt to f/u with cardiology.

## 2022-08-02 NOTE — PROGRESS NOTES
[facility]  Progress Note    Primary Care Doctor:  Rommel Wilcox MD    Chief Complaint   Patient presents with    Follow-up    Shortness of Breath     Cant catch her breath, trouble sleeping cant lay flat    Palpitations     Skipping beats        History of Present Illness:  80 y.o. female with history of Ms. Devin Parsons She has a history of atrial fibrillation (on xarelto), TIA, chronic kidney disease, CHF, hypertension, hyperlipidemia, mitral valve disease, hypothyroidism. Her last LVEF was 45% on 7/19/19. CABG, 7/08 cath- patent LIMA to LAD, SVG occluded to RCA; 8/2019 left to right fem-fem bypass and left fem to above knee popliteal bypass  Pacemaker generator change 1/25/21  Watchman implanted 5/17/2021    I had the pleasure of seeing Christiano Nevarez in follow up for systolic heart failure and urgent visit for anxious, cant sleep for 3 days and shortness of breath since her DONA status post watchman. She is in a wheel chair with her daughter. She stopped her plavix as she felt this was causing her problems (off for 3 days). Her BP is elevated today. No chest pain or palpitations. EKG shows AF. Weight is stable.   Last labs in June no bnp    Past Medical History:   has a past medical history of Allergic rhinitis, cause unspecified, Arthritis, Atrial fibrillation ([address] Bronchopneumonia, CAD (coronary artery disease), Cerebral artery occlusion with cerebral infarction [facility]), Chronic gouty arthropathy, Chronic kidney disease, Congestive heart failure, unspecified, Degeneration of cervical intervertebral disc, Essential and other specified forms of tremor, Gout, HIGH CHOLESTEROL, History of CVA (cerebrovascular accident), Hx of blood clots, Hypertension, Influenza, Leakage of Watchman left atrial appendage closure device, Mitral valve stenosis and aortic valve insufficiency, Movement disorder, Pacemaker, Peptic ulcer, unspecified site, unspecified as acute or chronic, without mention of hemorrhage, perforation, or obstruction, Thyroid disease, Unspecified disorder of kidney and ureter, and Unspecified hypothyroidism. Surgical History:   has a past surgical history that includes back surgery; Cholecystectomy; Cardiac surgery; Coronary artery bypass graft (1987); shoulder surgery; Cardiac catheterization (7/2012); hip surgery (Left, 03/16/2017); joint replacement; Cardiac catheterization (08/07/2018); Percutaneous Transluminal Coronary Angio (11/04/2014); pr njx aa&/strd tfrml epi lumbar/sacral 1 level (Right, 12/3/2018); Femoral-femoral Bypass Graft (N/A, 8/22/2019); femoral bypass (Left, 8/22/2019); and IR KYPHOPLASTY LUMBAR 1 VERTEBRAL BODY (5/14/2021). Social History:   reports that she quit smoking about 35 years ago. Her smoking use included cigarettes. She has a 28.00 pack-year smoking history. She has never used smokeless tobacco. She reports that she does not currently use alcohol. She reports that she does not use drugs. Family History:   Family History   Problem Relation Age of Onset    Cancer Sister     Heart Disease Sister     Diabetes Brother     Hypertension Brother     Heart Disease Brother     Stroke Maternal Aunt     Heart Disease Maternal Aunt     Diabetes Maternal Uncle     Hypertension Paternal Aunt     Cancer Maternal Grandmother     Cancer Paternal Grandmother     Rheum Arthritis Neg Hx     Lupus Neg Hx        Home Medications:  Prior to Admission medications    Medication Sig Start Date End Date Taking? Authorizing Provider   aspirin EC 81 MG EC tablet Take 1 tablet by mouth in the morning. 7/20/22  Yes Lucio Chavez MD   HYDROcodone-acetaminophen (NORCO) 5-325 MG per tablet Take 1 tablet by mouth 4 times daily as needed for Pain for up to 30 days.  7/15/22 8/14/22 Yes Ivelisse Dumont MD   allopurinol (ZYLOPRIM) 100 MG tablet TAKE 1 TABLET EVERY DAY 7/13/22  Yes Ivelisse Dumont MD   levothyroxine (SYNTHROID) 125 MCG tablet Take 1 tablet by mouth Daily 6/28/22  Yes Vishal Whitman MD Brea   torsemide (DEMADEX) 10 MG tablet TAKE 1 TABLET EVERY OTHER DAY ALTERNATING WITH  2  TABLETS EVERY OTHER DAY 6/16/22  Yes Magdi Cerna MD   topiramate (TOPAMAX) 50 MG tablet TAKE 2 TABLETS TWICE DAILY 6/16/22  Yes Magdi Cerna MD   cholestyramine light 4 g POWD Take 1 packet by mouth daily 5/9/22  Yes Magdi Cerna MD   vitamin D (ERGOCALCIFEROL) 1.25 MG (81342 UT) CAPS capsule TAKE 1 CAPSULE BY MOUTH ONCE A WEEK 2/28/22  Yes Magdi Cerna MD   nitroGLYCERIN (NITROLINGUAL) 0.4 MG/SPRAY 0.4 mg spray Place 1 spray under the tongue every 5 minutes as needed for Chest pain 2/8/22  Yes Magdi Cerna MD   fluticasone Pilar Hilt) 50 MCG/ACT nasal spray 2 sprays by Each Nostril route daily 1/25/22  Yes TIFFANIE Yanes CNP   metoprolol succinate (TOPROL XL) 25 MG extended release tablet Take 0.5 tablets by mouth daily . 5 tablet daily 12/6/21  Yes TIFFANIE Anaya CNP   acetaminophen (TYLENOL) 325 MG tablet Take 1,000 mg by mouth daily  4/5/21  Yes Historical Provider, MD   clopidogrel (PLAVIX) 75 MG tablet Take 1 tablet by mouth in the morning. Patient not taking: Reported on 8/2/2022 7/20/22   Farzad Cedeño MD        Allergies:  Aspirin, Diltiazem, Diltiazem hcl, Lorazepam, Sulfa antibiotics, Atorvastatin, Dabigatran, Mysoline [primidone], Nsaids, Other, and Primidone     Review of Systems:   Constitutional: there has been no unanticipated weight loss. There's been no change in energy level, sleep pattern, or activity level. Eyes: No visual changes or diplopia. No scleral icterus. ENT: No Headaches, hearing loss or vertigo. No mouth sores or sore throat. Cardiovascular: Reviewed in HPI  Respiratory: No cough or wheezing, no sputum production. No hematemesis. Gastrointestinal: No abdominal pain, appetite loss, blood in stools. No change in bowel or bladder habits.  States she feels bloated   Genitourinary: No dysuria, trouble voiding, or hematuria. Musculoskeletal:  No gait disturbance, weakness or joint complaints. Integumentary: No rash or pruritis. Neurological: No headache, diplopia, change in muscle strength, numbness or tingling. No change in gait, balance, coordination, mood, affect, memory, mentation, behavior. Psychiatric: No anxiety, no depression. Endocrine: No malaise, fatigue or temperature intolerance. No excessive thirst, fluid intake, or urination. No tremor. Hematologic/Lymphatic: No abnormal bruising or bleeding, blood clots or swollen lymph nodes. Allergic/Immunologic: No nasal congestion or hives. Physical Examination:    Vitals:    08/02/22 0850 08/02/22 0903   BP: (!) 150/70 (!) 152/76   Site: Left Upper Arm    Position: Sitting    Cuff Size: Medium Adult    Pulse: 80    SpO2: 100%    Weight: 121 lb (54.9 kg)    Height: 5' 3\" (1.6 m)         Constitutional and General Appearance: Warm and dry, no apparent distress, normal coloration  HEENT:  Normocephalic, atraumatic  Respiratory:  Normal excursion and expansion without use of accessory muscles  Resp Auscultation: Normal breath sounds without dullness  Cardiovascular: The apical impulses not displaced  Heart tones are crisp and normal  JVP + H2O  AF on EKG  Peripheral pulses are symmetrical and full  There is no clubbing, cyanosis of the extremities.   Bilateral lower ankle edema  Pedal Pulses: 2+ and equal   Abdomen:  No masses or tenderness  Liver/Spleen: No Abnormalities Noted  Neurological/Psychiatric:  Alert and oriented in all spheres  Moves all extremities well  Exhibits normal gait balance and coordination  No abnormalities of mood, affect, memory, mentation, or behavior are noted    Lab Data:    CBC:   Lab Results   Component Value Date/Time    WBC 10.7 08/02/2022 10:19 AM    WBC 9.7 03/17/2022 10:52 AM    WBC 7.5 01/24/2022 04:59 AM    RBC 5.04 08/02/2022 10:19 AM    RBC 4.50 06/08/2022 02:37 PM    RBC 5.46 03/17/2022 10:52 AM    HGB 13.5 08/02/2022 10:19 AM HGB 14.5 03/17/2022 10:52 AM    HGB 13.5 01/24/2022 04:59 AM    HCT 44.3 08/02/2022 10:19 AM    HCT 47.4 03/17/2022 10:52 AM    HCT 42.2 01/24/2022 04:59 AM    MCV 88.0 08/02/2022 10:19 AM    MCV 85.2 06/08/2022 02:37 PM    MCV 86.8 03/17/2022 10:52 AM    RDW 21.6 08/02/2022 10:19 AM    RDW 21.3 06/08/2022 02:37 PM    RDW 22.3 03/17/2022 10:52 AM     08/02/2022 10:19 AM     03/17/2022 10:52 AM     01/24/2022 04:59 AM     BMP:  Lab Results   Component Value Date/Time     08/02/2022 10:19 AM     06/08/2022 02:37 PM     03/17/2022 10:52 AM    K 4.6 08/02/2022 10:19 AM    K 3.9 06/08/2022 02:37 PM    K 4.5 03/17/2022 10:52 AM    K 4.5 01/22/2022 02:16 PM    K 4.9 12/16/2020 04:24 AM    K 4.2 11/07/2020 11:27 AM     08/02/2022 10:19 AM     06/08/2022 02:37 PM     03/17/2022 10:52 AM    CO2 23 08/02/2022 10:19 AM    CO2 23 06/08/2022 02:37 PM    CO2 21 03/17/2022 10:52 AM    PHOS 3.2 06/08/2022 02:37 PM    PHOS 4.4 03/17/2022 10:52 AM    PHOS 4.1 12/20/2021 12:46 PM    BUN 25 08/02/2022 10:19 AM    BUN 33 06/08/2022 02:37 PM    BUN 63 03/17/2022 10:52 AM    CREATININE 1.2 08/02/2022 10:19 AM    CREATININE 2.0 03/17/2022 10:52 AM    CREATININE 1.3 01/24/2022 05:00 AM     BNP:   Lab Results   Component Value Date/Time    PROBNP 10,715 08/02/2022 10:19 AM    PROBNP 4,196 01/22/2022 02:16 PM    PROBNP 3,042 01/05/2022 03:06 PM     Cardiac Imaging:  DONA Dr Peggy Prakash 7/20/2022  Summary   Normal left ventricle size, wall thickness, and systolic function with an   estimated ejection fraction of 55-60%. Moderate mitral regurgitation is present. Left atrial size is dilated. There is a Watchman device seated with a feliciano-device leak measured at 3 mm   on the anterior side. No thrombus seen. Moderate tricuspid regurgitation. Systolic pulmonary artery pressure (SPAP) estimated at 41 mmHg (RA pressure   3 mmHg), consistent with mild pulmonary hypertension.     Echo 1/12/2022  Summary   Normal left ventricle size, wall thickness, and systolic function with an   estimated ejection fraction of 55-60%. Mitral valve leaflets appear moderately thickened. The posterior leaflet   appears severely restricted. Moderate mitral regurgitation is present. Mitral annular calcification. Left atrial size is dilated. There is a Watchman device seated with a   feliciano-device leak measured at 2 mm on the anterior side. There is a small an echogenic mass on the surface of the device, consistent   with thrombus near the coumadin ridge. DONA 7/7/2021  Summary   Ejection fraction is visually estimated to be 45-50 %. Mild thickening of anterior, posterior leaflet of mitral valve. There is decreased leaflet motion and \"hockey stick\" appearance of the   mitral leaflets. posterior leaflet is immobile. Mean Gradient 4 mmHg. Moderate mitral regurgitation is present. There is no evidence of mass or thrombus in the left atrium or appendage. Watchman in place. No thrombus. <3 mm leak. The left atrium is dilated. A PFO is present. Aortic valve leaflets appear thickened. Tricuspid aortic valve. Mild aortic regurgitation is present. Plaque is noted in the thoracic aorta. Moderate tricuspid regurgitation. PASP 51 mmHg    [facility] 3/19/2021 Dr Breonna Nelson  Artery Findings/Result   LM Normal   LAD 50% ostial, 50% mid instent   Cx 50% mid at OM1 bifurcation, 30% OM1 mid at bifurcation   RI NA   % prox with multiple R-R collaterals   LVEDP 8   LVG NA due to renal failure      Intervention:         None     Post Cath Dx:       Stable CAD  Possible angina from  of RCA - poor candidate for  intervention with renal failure    Echo 4/30/2020   Left ventricular cavity size is normal. Normal left ventricular wall   thickness. Left ventricular function appears to be reduced with an estimated   ejection fraction of 45%. Diffuse hypokinesis.     Echo 7/19/2019   Summary    Left ventricle - normal size, thickness, reduced function with EF of 45%  LV wall motion - diffuse hypokinesis. Mitral valve - thickened, annular calcification, moderate regurgitation  Aortic valve - thickened, calcified, mild regurgitation  Tricuspid valve - mild regurgitation with PASP of 25mmHg  Pulmonic valve - trivial regurgitation   Biatrial enlargement  Pacemaker / ICD lead is visualized in the right heart. 6/2019 Dr Kimi Hummel  NSTEMI: . Angiogram findings were as below:      Findings:                      LM       Normal              LAD     50% ostial, mid 100%              Cx        40% OM1 at bifurcation              RCA     Mid 100%              LVG     Not performed              EDP     15 mmHg              L-LAD  Patent              S-PDA Known occluded  Severe Ca++:None  Post Cath Dx:Stable CAD, no apparent culprit for NSTEMI  Intervention:  None  Med Rec:        Continue aggressive med tx                          Continue plavix, no asa due to allergy. statin added. LDL 75   8/7/18  The PCI of complex proximal RCA chronic total occlusion was unsuccessful (J- score 3). Underwent successful Angioplasty of high-grade proximal RCA lesion proximal to the . RECOMMENDATIONS:  Attempt on this complex long  was unsuccessful. Lack   Of retrograde collaterals along with a very ambiguous cap as well as a large RV marginal branch and bridging collateral coming off at the cap makes it a  challenging repeat attempt. If she continues to have angina, it is recommended to proceed with the single-vessel SVG to the RCA. Echo: 2/17/16 ([facility])  Conclusions: Normal LV size and systolic function Mild to moderate mitral  regurgitation Mild tricuspid regurgitation  Findings:   Left Atrium: Mild to moderate enlargement of the left atrium. Left Ventricle: Upper limits of normal left ventricle size. No left ventricular  hypertrophy. Normal left ventricular systolic function.  The ejection fraction   Is visually

## 2022-08-03 RX ORDER — ERGOCALCIFEROL 1.25 MG/1
CAPSULE ORAL
Qty: 12 CAPSULE | Refills: 1 | Status: SHIPPED | OUTPATIENT
Start: 2022-08-03

## 2022-08-03 RX ORDER — TORSEMIDE 10 MG/1
TABLET ORAL
Qty: 135 TABLET | Refills: 1
Start: 2022-08-03 | End: 2022-08-17

## 2022-08-03 NOTE — TELEPHONE ENCOUNTER
Medication:   Requested Prescriptions     Pending Prescriptions Disp Refills    vitamin D (ERGOCALCIFEROL) 1.25 MG (38276 UT) CAPS capsule [Pharmacy Med Name: VITAMIN D 1.25 MG (87751 UT) Capsule] 12 capsule 1     Sig: TAKE 1 CAPSULE ONCE A WEEK      Last Filled:      Patient Phone Number: [phone] (home) [phone] (work)    Last appt: 6/28/2022   Next appt: 10/28/2022    Last OARRS:   RX Monitoring 6/1/2021   Attestation -   Periodic Controlled Substance Monitoring Possible medication side effects, risk of tolerance/dependence & alternative treatments discussed.      PDMP Monitoring:    Last PDMP Jolly Olvera as Reviewed [facility]):  Review User Review Instant Review Result   Marguerite HANNA 4/21/2022  4:20 PM Reviewed PDMP [1]     Preferred Pharmacy:   [facility] [id number] - [id number], [address] [place] [address] [phone] [facility] [phone]  [address] [place] [address]  Phone: [phone] Fax: [phone]    [address] Mail Delivery (Now [address] [employer] [facility] Mail Delivery) - Lina Larsen [phone] - F [phone]  [address] [place] [address]  Phone: [phone] Fax: [phone]

## 2022-08-15 ENCOUNTER — CARE COORDINATION (OUTPATIENT)
Dept: CARE COORDINATION | Age: 82
End: 2022-08-15

## 2022-08-15 ASSESSMENT — ENCOUNTER SYMPTOMS: DYSPNEA ASSOCIATED WITH: EXERTION

## 2022-08-16 ENCOUNTER — HOSPITAL ENCOUNTER (OUTPATIENT)
Age: 82
Discharge: HOME | End: 2022-08-16
Payer: MEDICARE

## 2022-08-16 ENCOUNTER — OFFICE VISIT (OUTPATIENT)
Dept: CARDIOLOGY CLINIC | Age: 82
End: 2022-08-16
Payer: MEDICARE

## 2022-08-16 VITALS
HEIGHT: 63 IN | BODY MASS INDEX: 20.84 KG/M2 | WEIGHT: 117.6 LBS | SYSTOLIC BLOOD PRESSURE: 107 MMHG | HEART RATE: 71 BPM | OXYGEN SATURATION: 99 % | DIASTOLIC BLOOD PRESSURE: 65 MMHG

## 2022-08-16 DIAGNOSIS — I50.22 CHRONIC SYSTOLIC HEART FAILURE (HCC): Primary | ICD-10-CM

## 2022-08-16 DIAGNOSIS — I48.20 CHRONIC ATRIAL FIBRILLATION (HCC): ICD-10-CM

## 2022-08-16 DIAGNOSIS — R06.02 SOB (SHORTNESS OF BREATH): ICD-10-CM

## 2022-08-16 DIAGNOSIS — N28.9 RENAL INSUFFICIENCY: ICD-10-CM

## 2022-08-16 DIAGNOSIS — I25.810 CORONARY ARTERY DISEASE INVOLVING AUTOLOGOUS ARTERY CORONARY BYPASS GRAFT WITHOUT ANGINA PECTORIS: ICD-10-CM

## 2022-08-16 DIAGNOSIS — I50.22 CHRONIC SYSTOLIC HEART FAILURE (HCC): ICD-10-CM

## 2022-08-16 LAB
ANION GAP SERPL CALCULATED.3IONS-SCNC: 12 MMOL/L (ref 3–16)
BUN BLDV-MCNC: 43 MG/DL (ref 7–20)
CALCIUM SERPL-MCNC: 9 MG/DL (ref 8.3–10.6)
CHLORIDE BLD-SCNC: 108 MMOL/L (ref 99–110)
CO2: 26 MMOL/L (ref 21–32)
CREAT SERPL-MCNC: 1.7 MG/DL (ref 0.6–1.2)
GFR AFRICAN AMERICAN: 35
GFR NON-AFRICAN AMERICAN: 29
GLUCOSE BLD-MCNC: 89 MG/DL (ref 70–99)
POTASSIUM SERPL-SCNC: 4.4 MMOL/L (ref 3.5–5.1)
PRO-BNP: 3726 PG/ML (ref 0–449)
SODIUM BLD-SCNC: 146 MMOL/L (ref 136–145)
T3 TOTAL: 0.89 NG/ML (ref 0.8–2)
T4 FREE: 1.6 NG/DL (ref 0.9–1.8)
TSH REFLEX: 0.11 UIU/ML (ref 0.27–4.2)

## 2022-08-16 PROCEDURE — G8399 PT W/DXA RESULTS DOCUMENT: HCPCS | Performed by: CLINICAL NURSE SPECIALIST

## 2022-08-16 PROCEDURE — G8420 CALC BMI NORM PARAMETERS: HCPCS | Performed by: CLINICAL NURSE SPECIALIST

## 2022-08-16 PROCEDURE — 80048 BASIC METABOLIC PNL TOTAL CA: CPT

## 2022-08-16 PROCEDURE — 83880 ASSAY OF NATRIURETIC PEPTIDE: CPT

## 2022-08-16 PROCEDURE — 84443 ASSAY THYROID STIM HORMONE: CPT

## 2022-08-16 PROCEDURE — 1123F ACP DISCUSS/DSCN MKR DOCD: CPT | Performed by: CLINICAL NURSE SPECIALIST

## 2022-08-16 PROCEDURE — 84480 ASSAY TRIIODOTHYRONINE (T3): CPT

## 2022-08-16 PROCEDURE — 36415 COLL VENOUS BLD VENIPUNCTURE: CPT

## 2022-08-16 PROCEDURE — G8427 DOCREV CUR MEDS BY ELIG CLIN: HCPCS | Performed by: CLINICAL NURSE SPECIALIST

## 2022-08-16 PROCEDURE — 1090F PRES/ABSN URINE INCON ASSESS: CPT | Performed by: CLINICAL NURSE SPECIALIST

## 2022-08-16 PROCEDURE — 84439 ASSAY OF FREE THYROXINE: CPT

## 2022-08-16 PROCEDURE — 1036F TOBACCO NON-USER: CPT | Performed by: CLINICAL NURSE SPECIALIST

## 2022-08-16 PROCEDURE — 99214 OFFICE O/P EST MOD 30 MIN: CPT | Performed by: CLINICAL NURSE SPECIALIST

## 2022-08-16 RX ORDER — PREDNISONE 10 MG/1
10 TABLET ORAL 2 TIMES DAILY
Qty: 20 TABLET | Refills: 0 | Status: SHIPPED | OUTPATIENT
Start: 2022-08-16 | End: 2022-08-16 | Stop reason: ALTCHOICE

## 2022-08-16 NOTE — TELEPHONE ENCOUNTER
Prescription for prednisone medication was sent to the pharmacy to calm down the hip discomfort.   If not improving recommend orthopedic consultation

## 2022-08-16 NOTE — PROGRESS NOTES
[address]  Progress Note    Primary Care Doctor:  Alexa Veronica MD    Chief Complaint   Patient presents with    Cardiomyopathy    Dizziness        History of Present Illness:  80 y.o. female with history of Ms. Megha Victor She has a history of atrial fibrillation (on xarelto), TIA, chronic kidney disease, CHF, hypertension, hyperlipidemia, mitral valve disease, hypothyroidism. Her last LVEF was 45% on 7/19/19. CABG, 7/08 cath- patent LIMA to LAD, SVG occluded to RCA; 8/2019 left to right fem-fem bypass and left fem to above knee popliteal bypass  Pacemaker generator change 1/25/21  Watchman implanted 5/17/2021    I had the pleasure of seeing Peyton Doanw in follow up for systolic heart failure with improved LVEF. She is ambulatory and with her daughter today. She feels much better but still with some shortness of breath. She received IV lasix at her last visit as her bnp was really elevated. Her weight is down 4 pounds. She continues on the torsemide 20 mg daily. She used her daughter's inhaler and she that made her a little better. She is shortness of breath when she is anxious or just starting to do something in the house. No chest pain, palpitations or leg edema.     Past Medical History:   has a past medical history of Allergic rhinitis, cause unspecified, Arthritis, Atrial fibrillation ([address] Bronchopneumonia, CAD (coronary artery disease), Cerebral artery occlusion with cerebral infarction ([address] Chronic gouty arthropathy, Chronic kidney disease, Congestive heart failure, unspecified, Degeneration of cervical intervertebral disc, Essential and other specified forms of tremor, Gout, HIGH CHOLESTEROL, History of CVA (cerebrovascular accident), Hx of blood clots, Hypertension, Influenza, Leakage of Watchman left atrial appendage closure device, Mitral valve stenosis and aortic valve insufficiency, Movement disorder, Pacemaker, Peptic ulcer, unspecified site, unspecified as acute or chronic, without mention of hemorrhage, perforation, or obstruction, Thyroid disease, Unspecified disorder of kidney and ureter, and Unspecified hypothyroidism. Surgical History:   has a past surgical history that includes back surgery; Cholecystectomy; Cardiac surgery; Coronary artery bypass graft (1987); shoulder surgery; Cardiac catheterization (7/2012); hip surgery (Left, 03/16/2017); joint replacement; Cardiac catheterization (08/07/2018); Percutaneous Transluminal Coronary Angio (11/04/2014); pr njx aa&/strd tfrml epi lumbar/sacral 1 level (Right, 12/3/2018); Femoral-femoral Bypass Graft (N/A, 8/22/2019); femoral bypass (Left, 8/22/2019); and IR KYPHOPLASTY LUMBAR 1 VERTEBRAL BODY (5/14/2021). Social History:   reports that she quit smoking about 35 years ago. Her smoking use included cigarettes. She has a 28.00 pack-year smoking history. She has never used smokeless tobacco. She reports that she does not currently use alcohol. She reports that she does not use drugs. Family History:   Family History   Problem Relation Age of Onset    Cancer Sister     Heart Disease Sister     Diabetes Brother     Hypertension Brother     Heart Disease Brother     Stroke Maternal Aunt     Heart Disease Maternal Aunt     Diabetes Maternal Uncle     Hypertension Paternal Aunt     Cancer Maternal Grandmother     Cancer Paternal Grandmother     Rheum Arthritis Neg Hx     Lupus Neg Hx        Home Medications:  Prior to Admission medications    Medication Sig Start Date End Date Taking? Authorizing Provider   torsemide (DEMADEX) 10 MG tablet 20 mg everyday  Patient taking differently: Take 20 mg by mouth in the morning.  8/3/22  Yes Tory Rashid APRN - CNS   vitamin D (ERGOCALCIFEROL) 1.25 MG (47569 UT) CAPS capsule TAKE 1 CAPSULE ONCE A WEEK 8/3/22  Yes Magdi Cerna MD   aspirin EC 81 MG EC tablet Take 1 tablet by mouth in the morning. 7/20/22  Yes Farzad Cedeño MD   clopidogrel (PLAVIX) 75 MG tablet Take 1 tablet by mouth in the morning. 7/20/22  Yes Viva Osgood, MD   allopurinol (ZYLOPRIM) 100 MG tablet TAKE 1 TABLET EVERY DAY 7/13/22  Yes Bettyjo Gottron, MD   levothyroxine (SYNTHROID) 125 MCG tablet Take 1 tablet by mouth Daily 6/28/22  Yes Bettyjo Gottron, MD   topiramate (TOPAMAX) 50 MG tablet TAKE 2 TABLETS TWICE DAILY 6/16/22  Yes Bettyjo Gottron, MD   nitroGLYCERIN (NITROLINGUAL) 0.4 MG/SPRAY 0.4 mg spray Place 1 spray under the tongue every 5 minutes as needed for Chest pain 2/8/22  Yes Bettyjo Gottron, MD   fluticasone [facility]) 50 MCG/ACT nasal spray 2 sprays by Each Nostril route daily 1/25/22  Yes Sherrin Collet Dumouchelle, APRN - CNP   metoprolol succinate (TOPROL XL) 25 MG extended release tablet Take 0.5 tablets by mouth daily . 5 tablet daily 12/6/21  Yes TIFFANIE Tatum CNP        Allergies:  Aspirin, Diltiazem, Diltiazem hcl, Lorazepam, Sulfa antibiotics, Atorvastatin, Dabigatran, Mysoline [primidone], Nsaids, Other, and Primidone     Review of Systems:   Constitutional: there has been no unanticipated weight loss. There's been no change in energy level, sleep pattern, or activity level. Eyes: No visual changes or diplopia. No scleral icterus. ENT: No Headaches, hearing loss or vertigo. No mouth sores or sore throat. Cardiovascular: Reviewed in HPI  Respiratory: No cough or wheezing, no sputum production. No hematemesis. Gastrointestinal: No abdominal pain, appetite loss, blood in stools. No change in bowel or bladder habits. States she feels bloated   Genitourinary: No dysuria, trouble voiding, or hematuria. Musculoskeletal:  No gait disturbance, weakness or joint complaints. Integumentary: No rash or pruritis. Neurological: No headache, diplopia, change in muscle strength, numbness or tingling. No change in gait, balance, coordination, mood, affect, memory, mentation, behavior. Psychiatric: No anxiety, no depression. Endocrine: No malaise, fatigue or temperature intolerance.  No excessive thirst, fluid intake, or urination. No tremor. Hematologic/Lymphatic: No abnormal bruising or bleeding, blood clots or swollen lymph nodes. Allergic/Immunologic: No nasal congestion or hives. Physical Examination:    Vitals:    08/16/22 1107   BP: 107/65   Pulse: 71   SpO2: 99%   Weight: 117 lb 9.6 oz (53.3 kg)   Height: 5' 3\" (1.6 m)        Constitutional and General Appearance: Warm and dry, no apparent distress, normal coloration  HEENT:  Normocephalic, atraumatic  Respiratory:  Normal excursion and expansion without use of accessory muscles  Resp Auscultation: Normal breath sounds without dullness  Cardiovascular: The apical impulses not displaced  Heart tones are crisp and normal  Normal JVP  Regular rhythm   Peripheral pulses are symmetrical and full  There is no clubbing, cyanosis of the extremities.   Bilateral lower ankle edema much improved  Pedal Pulses: 2+ and equal   Abdomen:  No masses or tenderness  Liver/Spleen: No Abnormalities Noted  Neurological/Psychiatric:  Alert and oriented in all spheres  Moves all extremities well  Exhibits normal gait balance and coordination  No abnormalities of mood, affect, memory, mentation, or behavior are noted    Lab Data:    CBC:   Lab Results   Component Value Date/Time    WBC 10.7 08/02/2022 10:19 AM    WBC 9.7 03/17/2022 10:52 AM    WBC 7.5 01/24/2022 04:59 AM    RBC 5.04 08/02/2022 10:19 AM    RBC 4.50 06/08/2022 02:37 PM    RBC 5.46 03/17/2022 10:52 AM    HGB 13.5 08/02/2022 10:19 AM    HGB 14.5 03/17/2022 10:52 AM    HGB 13.5 01/24/2022 04:59 AM    HCT 44.3 08/02/2022 10:19 AM    HCT 47.4 03/17/2022 10:52 AM    HCT 42.2 01/24/2022 04:59 AM    MCV 88.0 08/02/2022 10:19 AM    MCV 85.2 06/08/2022 02:37 PM    MCV 86.8 03/17/2022 10:52 AM    RDW 21.6 08/02/2022 10:19 AM    RDW 21.3 06/08/2022 02:37 PM    RDW 22.3 03/17/2022 10:52 AM     08/02/2022 10:19 AM     03/17/2022 10:52 AM     01/24/2022 04:59 AM     BMP:  Lab Results Component Value Date/Time     08/02/2022 10:19 AM     06/08/2022 02:37 PM     03/17/2022 10:52 AM    K 4.6 08/02/2022 10:19 AM    K 3.9 06/08/2022 02:37 PM    K 4.5 03/17/2022 10:52 AM    K 4.5 01/22/2022 02:16 PM    K 4.9 12/16/2020 04:24 AM    K 4.2 11/07/2020 11:27 AM     08/02/2022 10:19 AM     06/08/2022 02:37 PM     03/17/2022 10:52 AM    CO2 23 08/02/2022 10:19 AM    CO2 23 06/08/2022 02:37 PM    CO2 21 03/17/2022 10:52 AM    PHOS 3.2 06/08/2022 02:37 PM    PHOS 4.4 03/17/2022 10:52 AM    PHOS 4.1 12/20/2021 12:46 PM    BUN 25 08/02/2022 10:19 AM    BUN 33 06/08/2022 02:37 PM    BUN 63 03/17/2022 10:52 AM    CREATININE 1.2 08/02/2022 10:19 AM    CREATININE 2.0 03/17/2022 10:52 AM    CREATININE 1.3 01/24/2022 05:00 AM     BNP:   Lab Results   Component Value Date/Time    PROBNP 10,715 08/02/2022 10:19 AM    PROBNP 4,196 01/22/2022 02:16 PM    PROBNP 3,042 01/05/2022 03:06 PM     Cardiac Imaging:  [facility] Dr Dagoberto Astorga 7/20/2022  Summary   Normal left ventricle size, wall thickness, and systolic function with an   estimated ejection fraction of 55-60%. Moderate mitral regurgitation is present. Left atrial size is dilated. There is a Watchman device seated with a feliciano-device leak measured at 3 mm   on the anterior side. No thrombus seen. Moderate tricuspid regurgitation. Systolic pulmonary artery pressure (SPAP) estimated at 41 mmHg (RA pressure   3 mmHg), consistent with mild pulmonary hypertension. Echo 1/12/2022  Summary   Normal left ventricle size, wall thickness, and systolic function with an   estimated ejection fraction of 55-60%. Mitral valve leaflets appear moderately thickened. The posterior leaflet   appears severely restricted. Moderate mitral regurgitation is present. Mitral annular calcification. Left atrial size is dilated. There is a Watchman device seated with a   feliciano-device leak measured at 2 mm on the anterior side.       There is a small an echogenic mass on the surface of the device, consistent   with thrombus near the coumadin ridge. [facility] 7/7/2021  Summary   Ejection fraction is visually estimated to be 45-50 %. Mild thickening of anterior, posterior leaflet of mitral valve. There is decreased leaflet motion and \"hockey stick\" appearance of the   mitral leaflets. posterior leaflet is immobile. Mean Gradient 4 mmHg. Moderate mitral regurgitation is present. There is no evidence of mass or thrombus in the left atrium or appendage. Watchman in place. No thrombus. <3 mm leak. The left atrium is dilated. A PFO is present. Aortic valve leaflets appear thickened. Tricuspid aortic valve. Mild aortic regurgitation is present. Plaque is noted in the thoracic aorta. Moderate tricuspid regurgitation. PASP 51 mmHg    [facility] 3/19/2021 Dr Kevin Jones  Artery Findings/Result   LM Normal   LAD 50% ostial, 50% mid instent   Cx 50% mid at OM1 bifurcation, 30% OM1 mid at bifurcation   RI NA   % prox with multiple R-R collaterals   LVEDP 8   LVG NA due to renal failure      Intervention:         None     Post Cath Dx:       Stable CAD  Possible angina from  of RCA - poor candidate for  intervention with renal failure    Echo 4/30/2020   Left ventricular cavity size is normal. Normal left ventricular wall   thickness. Left ventricular function appears to be reduced with an estimated   ejection fraction of 45%. Diffuse hypokinesis. Echo 7/19/2019   Summary    Left ventricle - normal size, thickness, reduced function with EF of 45%  LV wall motion - diffuse hypokinesis. Mitral valve - thickened, annular calcification, moderate regurgitation  Aortic valve - thickened, calcified, mild regurgitation  Tricuspid valve - mild regurgitation with PASP of 25mmHg  Pulmonic valve - trivial regurgitation   Biatrial enlargement  Pacemaker / ICD lead is visualized in the right heart.     6/2019 Dr Kevin Jones  NSTEMI: . Angiogram findings were as below: Findings:                      LM       Normal              LAD     50% ostial, mid 100%              Cx        40% OM1 at bifurcation              RCA     Mid 100%              LVG     Not performed              EDP     15 mmHg              L-LAD  Patent              S-PDA Known occluded  Severe Ca++:None  Post Cath Dx:Stable CAD, no apparent culprit for NSTEMI  Intervention:  None  Med Rec:        Continue aggressive med tx                          Continue plavix, no asa due to allergy. statin added. LDL 75   8/7/18  The PCI of complex proximal RCA chronic total occlusion was unsuccessful (J- score 3). Underwent successful Angioplasty of high-grade proximal RCA lesion proximal to the . RECOMMENDATIONS:  Attempt on this complex long  was unsuccessful. Lack   Of retrograde collaterals along with a very ambiguous cap as well as a large RV marginal branch and bridging collateral coming off at the cap makes it a  challenging repeat attempt. If she continues to have angina, it is recommended to proceed with the single-vessel SVG to the RCA. Echo: 2/17/16 ([facility])  Conclusions: Normal LV size and systolic function Mild to moderate mitral  regurgitation Mild tricuspid regurgitation  Findings:   Left Atrium: Mild to moderate enlargement of the left atrium. Left Ventricle: Upper limits of normal left ventricle size. No left ventricular  hypertrophy. Normal left ventricular systolic function. The ejection fraction   Is visually estimated to be 55 %. Right Atrium: Normal right atrial size. RightVentricle: Normal right ventricle size. Normal right ventricular function. Aortic Valve: Tri-leaflet aortic valve. Mild aortic cusp calcification. No  aortic valve stenosis. Mild (1+) aortic valve regurgitation. Aorta: No dilatation of the aortic root. IVC/PA: Normal IVC size and normal respiratory  collapse consistent with normal right atrial pressure.    Mitral Valve: Mild mitral valve leaflet calcification. Mild to moderate (2+) mitral valve  regurgitation. No mitral valve stenosis. Tricuspid Valve: Mild (1+) tricuspid  regurgitation. The pulmonary artery pressure is normal.   Pulmonic Valve: Mild  pulmonic regurgitation. Pericardium: Normal pericardium with no significant  pericardial effusion. Assessment:    1. Chronic systolic heart failure (HCC) with improved LVEF on bb; no aldosterone antagonist due to renal insuff   2. Chronic atrial fibrillation s/p watchman Dr Viridiana Fonseca   3. Coronary artery disease involving autologous artery coronary bypass graft without angina pectoris    4. Renal insufficiency follows with Dr Solis Dunbar  5.       Shortness of breath    Plan:   Continue current medications  Blood work today  Stress test   RTO in 2 months with me at [address]    I appreciate the opportunity of cooperating in the care of this individual.    TIFFANIE Muller - CNS, CNS, 8/16/2022, 11:16 AM

## 2022-08-16 NOTE — CARE COORDINATION
Ambulatory Care Coordination Note  8/15/2022    ACC: Galvin Felty, RN    Summary Note: ACM made outreach to patient for follow up with Care Management. ACM discussed CHF with patient. Patient states that she continues to check weight first thing in the AM. Per patient she has gotten better at checking her weight daily but if she gets busy before checking she sometimes forgets. Patient plans to move her scale in the bedroom so she can do it before getting her day started. Patient states that she has appointment with cardiologist tomorrow to follow up on recent SOB. Per patient she followed guidelines given at her recent appointment and states that she has been feeling much better and believes her symptoms are at baseline. She has not had any recent concerns with SOB, edema, cough, or weight gain. She has continued to monitor her symptoms closely. Patient advised ACM that she has complaints of hip pain. Per patient she hurts on both side but feels it worse on the right side. Patient states that the pain is chronic but feels it is getting worse. She has tried heat, ice, position changes ect. She states that she takes Norco nightly to help sleep. She is requesting an appointment for evaluation on hip. She states that she used to take an arthritis medication in the past that helped but it was taken off the market. Per patient she cannot remember the name of it but she is hoping PCP will know what medication it was and have an alterative for her to try. ACM reviewed hip pain reference in Epic with patient during call. Patient states that her hip pain has limited her ability to walk but she been using her motorized scooter to get outside. Patient states that she enjoys being at the park and would like to get back to walking outdoors. ACM discussed the [email or web address] with patient and she has agreed to outreach.     Plan  F/U Hip Pain  F/U CHF         Lab Results       None            Care Coordination Interventions    Referral from Primary Care Provider: No  Suggested Interventions and Community Resources  Fall Risk Prevention: In Process  Zone Management Tools: In Process (Comment: Discussed CHF Summer Outreach Zone Handout provided for patient reference)          Goals Addressed                   This Visit's Progress     Self Monitoring   Improving     Daily Weights - I will weight myself as directed - Daily and write down weights  I will notify my provider of any increase in weight by 3 or more pounds in 2 days OR 5 or more pounds in a week. Blood Pressure - I will take my blood pressure as directed - Daily  I will notify my provider of any trends of increasing or decreasing blood pressures over a month period of time. I will notify my provider of any changes in blood pressure associated with symptoms of dizziness, falls, passing out, headache, confusion/change in mental status. None Recently Recorded    Barriers: overwhelmed by complexity of regimen, stress, and time constraints  Plan for overcoming my barriers: work with Lachelle Szymanski, PCP, and specialist  Confidence: 8/10  Anticipated Goal Completion Date: 9/2/2022                Prior to Admission medications    Medication Sig Start Date End Date Taking? Authorizing Provider   torsemide (DEMADEX) 10 MG tablet 20 mg everyday 8/3/22   TIFFANIE Moe - CNS   vitamin D (ERGOCALCIFEROL) 1.25 MG (95919 UT) CAPS capsule TAKE 1 CAPSULE ONCE A WEEK 8/3/22   Osmel Pack MD   aspirin EC 81 MG EC tablet Take 1 tablet by mouth in the morning. 7/20/22   Nathalia Darden MD   clopidogrel (PLAVIX) 75 MG tablet Take 1 tablet by mouth in the morning.   Patient not taking: Reported on 8/2/2022 7/20/22   Nathalia Darden MD   allopurinol (ZYLOPRIM) 100 MG tablet TAKE 1 TABLET EVERY DAY 7/13/22   Osmel Pack MD   levothyroxine (SYNTHROID) 125 MCG tablet Take 1 tablet by mouth Daily 6/28/22   Osmel Pack MD   topiramate (TOPAMAX) 50 MG tablet TAKE 2 TABLETS TWICE DAILY 6/16/22   Librado Lora MD   cholestyramine light 4 g POWD Take 1 packet by mouth daily 5/9/22   Librado Lora MD   nitroGLYCERIN (NITROLINGUAL) 0.4 MG/SPRAY 0.4 mg spray Place 1 spray under the tongue every 5 minutes as needed for Chest pain 2/8/22   Librado Lora MD   fluticasone [facility]) 50 MCG/ACT nasal spray 2 sprays by Each Nostril route daily 1/25/22   TIFFANIE Smith CNP   metoprolol succinate (TOPROL XL) 25 MG extended release tablet Take 0.5 tablets by mouth daily . 5 tablet daily 12/6/21   Wannetta Litten, APRN - CNP   acetaminophen (TYLENOL) 325 MG tablet Take 1,000 mg by mouth daily  4/5/21   Historical Provider, MD       Future Appointments   Date Time Provider [place]   8/16/2022 11:00 AM TIFFANIE Noe [facility]   9/20/2022 11:00 AM Atilano Baumgarten, MD [facility]   10/28/2022  1:00 PM Librado Lora MD [facility]   11/7/2022  2:00 PM Wannetta Litten, APRN - CNP [facility]   ,   Congestive Heart Failure Assessment    Are you currently restricting fluids?: 1800cc  Do you understand a low sodium diet?: Yes  Do you understand how to read food labels?: Yes  How many restaurant meals do you eat per week?: 0  Do you salt your food before tasting it?: No         Symptoms:  None: Yes      Symptom course: stable  Weight trend: stable  Salt intake watch compared to last visit: stable     ,   COPD Assessment    Does the patient understand envrionmental exposure?: Yes  Is the patient able to verbalize Rescue vs. Long Acting medications?: Yes  Does the patient have a nebulizer?: No  Does the patient use a space with inhaled medications?: No            Symptoms:  None: Yes      Symptom course: stable  Breathlessness: exertion  Increase use of rapid acting/rescue inhaled medications?: Not Applicable  Change in chronic cough?: No/At Baseline  Change in sputum?: No/At Baseline  Self Monitoring - SaO2: No  Have you had a recent diagnosis of pneumonia either by PCP or at a hospital?: No     ,   General Assessment    Do you have any symptoms that are causing concern?: Yes  Progression since Onset: Intermittent - Waxing/Waning  Reported Symptoms: Pain, Weakness (Comment: right hip pain)     , and Care Coordination Episodes    Type: Amb Care Management  Episode: Complex Care  Noted: 5/27/2022

## 2022-08-16 NOTE — PATIENT INSTRUCTIONS
Continue current medications  Blood work today  Stress test   RTO in 2 months with me at [address]

## 2022-08-17 ENCOUNTER — NURSE TRIAGE (OUTPATIENT)
Dept: OTHER | Facility: CLINIC | Age: 82
End: 2022-08-17

## 2022-08-17 ENCOUNTER — TELEPHONE (OUTPATIENT)
Dept: FAMILY MEDICINE CLINIC | Age: 82
End: 2022-08-17

## 2022-08-17 ENCOUNTER — TELEPHONE (OUTPATIENT)
Dept: CARDIOLOGY CLINIC | Age: 82
End: 2022-08-17

## 2022-08-17 DIAGNOSIS — I50.22 CHRONIC SYSTOLIC HEART FAILURE (HCC): Primary | ICD-10-CM

## 2022-08-17 DIAGNOSIS — I48.20 CHRONIC ATRIAL FIBRILLATION (HCC): ICD-10-CM

## 2022-08-17 RX ORDER — ALBUTEROL SULFATE 90 UG/1
2 AEROSOL, METERED RESPIRATORY (INHALATION) 4 TIMES DAILY PRN
Qty: 18 G | Refills: 0 | Status: SHIPPED | OUTPATIENT
Start: 2022-08-17

## 2022-08-17 RX ORDER — TORSEMIDE 10 MG/1
TABLET ORAL
Qty: 135 TABLET | Refills: 1 | Status: ON HOLD
Start: 2022-08-17 | End: 2022-09-16 | Stop reason: HOSPADM

## 2022-08-17 NOTE — TELEPHONE ENCOUNTER
Received call from [address] at [facility] with Red Flag Complaint. Subjective: Caller states \"SOB\"     Current Symptoms: Advised by cardiologist to reach out to PCP about using an inhaler. Recently pt used daughter's inhaler and felt better. Eval by cardio yesterday, no new or worsening symptoms at this time. No triage indicated. Denies any other questions or concerns; instructed to call back for any new or worsening symptoms. Connected with [facility] for appt scheduling, first available with Dr. Abdoulaye Richmond per cardiologist recommendation. Attention Provider: Thank you for allowing me to participate in the care of your patient. The patient was connected to triage in response to information provided to the ECC/PSC. Please do not respond through this encounter as the response is not directed to a shared pool.       Reason for Disposition   Caller has already spoken with the PCP (or office), and has no further questions    Protocols used: No Contact or Duplicate Contact Call-ADULT-[place]

## 2022-08-17 NOTE — TELEPHONE ENCOUNTER
Patient calling to see if Dr. Mandy Salmon is able to prescribe an inhaler for her. States she was seen by cardiology yesterday and was advised by them to get in touch with her PCP. Advised her to discuss about a possible inhaler. Patient states she used her daughter's Albuterol rescue inhaler. Is patient able to get it prescribed to help with her SOB? Call back number [phone].     Please advise

## 2022-08-17 NOTE — TELEPHONE ENCOUNTER
----- Message from TIFFANIE Allen - CNS sent at 8/17/2022  9:09 AM EDT -----  Fluid level much improved  She needs to cut her torsemide back to 20 mg 2 days of the week and 10 mg the rest  Recheck blood work in 2-3 weeks  Thyroid looks normal but I will send to Dr Chris Partida   Thanks    Dr Chris Partida   Will you look and address the Thyroid  Thanks  Tory

## 2022-08-22 DIAGNOSIS — M15.9 PRIMARY OSTEOARTHRITIS INVOLVING MULTIPLE JOINTS: ICD-10-CM

## 2022-08-22 RX ORDER — HYDROCODONE BITARTRATE AND ACETAMINOPHEN 5; 325 MG/1; MG/1
1 TABLET ORAL 4 TIMES DAILY PRN
Qty: 120 TABLET | Refills: 0 | Status: SHIPPED | OUTPATIENT
Start: 2022-08-22 | End: 2022-09-26 | Stop reason: SDUPTHER

## 2022-08-22 NOTE — TELEPHONE ENCOUNTER
Medication:   Requested Prescriptions     Pending Prescriptions Disp Refills    HYDROcodone-acetaminophen (NORCO) 5-325 MG per tablet 120 tablet 0     Sig: Take 1 tablet by mouth 4 times daily as needed for Pain for up to 30 days. Last Filled:  7/15/22    Patient Phone Number: [phone] (home) [phone] (work)    Last appt: 6/28/2022   Next appt: 8/23/2022    Last OARRS:   RX Monitoring 6/1/2021   Attestation -   Periodic Controlled Substance Monitoring Possible medication side effects, risk of tolerance/dependence & alternative treatments discussed.      PDMP Monitoring:    Last PDMP Farhat Moreau as Reviewed [facility]):  Review User Review Instant Review Result   Merly Florentincampbell 4/21/2022  4:20 PM Reviewed PDMP [1]     Preferred Pharmacy:     [facility] [id number] Petr URBANO [place] [facility] [phone] Koki Gutierrez [phone]  [address] [place] [address]  Phone: [phone] Fax: [phone]

## 2022-08-22 NOTE — TELEPHONE ENCOUNTER
HYDROcodone-acetaminophen (NORCO) 5-325 MG per tablet 120 tablet 0 7/15/2022 8/14/2022    Sig - Route:  Take 1 tablet by mouth 4 times daily as needed for Pain for up to 30 days. - Oral      [facility] in chart

## 2022-08-22 NOTE — PROGRESS NOTES
Vivian Lagunas is here today to follow up recent ED visit. Was seen in [place] on 8/20/22. First time was 3-4 days before second time, got off balance dealing with dogs. The second time she fell was in the bathroom, can't use walker in there as too small. Doesn't think hit anything but landed on left side. States arm still bruised but the left knee really painful. Went to ED after second fall. Checked XR - all ok except swelling left knee. Pt felt might have UTI as was having frequency and UA done in ED and told had UTI, on cipro twice daily x 5 days and pt feels sx better. States both knees hurt but left hurts to ankle, hurts to walk, swollen and red. Reviewed chart notes, labs and imaging in [facility] and[facility]or [facility]. Vitals:    08/23/22 1108   BP: 110/60   Site: Left Upper Arm   Position: Sitting   Cuff Size: Medium Adult   Pulse: 70   Temp: 97.5 °F (36.4 °C)   TempSrc: Infrared   SpO2: 94%   Weight: 119 lb 6.4 oz (54.2 kg)     Wt Readings from Last 3 Encounters:   08/23/22 119 lb 6.4 oz (54.2 kg)   08/16/22 117 lb 9.6 oz (53.3 kg)   08/02/22 121 lb (54.9 kg)     Body mass index is 21.15 kg/m². PHQ Scores 1/26/2022 5/12/2020 4/18/2019 7/16/2018 3/14/2018 3/2/2017   PHQ2 Score 0 0 0 0 0 0   PHQ9 Score 0 0 0 0 0 0       Interpretation of Total Score Depression Severity: 1-4 = Minimal depression, 5-9 = Mild depression, 10-14 = Moderate depression, 15-19 = Moderately severe depression, 20-27 = Severe depression       GEN: Alert and oriented x 4 NAD, affect appropriate and normal appearing weight, well hydrated, well developed. Left upper arm covered in purple hematoma  Left knee swollen, red, warm and tender.  Slight abrasion anterior knee, no drainage, swelling extending down leg  Right knee with brusing medially but no sig swelling        ASSESSMENT AND PLAN:       Daniel Casey was seen today for follow-up from hospital.    Diagnoses and all orders for this visit:    Cellulitis of left knee  -     doxycycline hyclate (VIBRA-TABS) 100 MG tablet;  Take 1 tablet by mouth 2 times daily for 7 days  If not improving in next few days needs to RTO    Acute cystitis without hematuria  Finish ceftin from ED    Traumatic hematoma of left upper arm, subsequent encounter  Discussed will take time to resolve    Right hip pain  -     [address] [facility]  Pt states has been haivng hip pain for a long time, would like to see ortho, referral placed        Portions of Note per  Karolina Molina CMA AAMA with corrections and edits per Irma Canales MD.  I agree with entirety of note and was present and performed history and physical.  I also confirm that the note above accurately reflects all work, treatment, procedures, and medical decision making performed by me, Irma Canales MD

## 2022-08-23 ENCOUNTER — OFFICE VISIT (OUTPATIENT)
Dept: FAMILY MEDICINE CLINIC | Age: 82
End: 2022-08-23
Payer: MEDICARE

## 2022-08-23 VITALS
HEART RATE: 70 BPM | DIASTOLIC BLOOD PRESSURE: 60 MMHG | SYSTOLIC BLOOD PRESSURE: 110 MMHG | BODY MASS INDEX: 21.15 KG/M2 | OXYGEN SATURATION: 94 % | TEMPERATURE: 97.5 F | WEIGHT: 119.4 LBS

## 2022-08-23 DIAGNOSIS — N30.00 ACUTE CYSTITIS WITHOUT HEMATURIA: ICD-10-CM

## 2022-08-23 DIAGNOSIS — S40.022D TRAUMATIC HEMATOMA OF LEFT UPPER ARM, SUBSEQUENT ENCOUNTER: ICD-10-CM

## 2022-08-23 DIAGNOSIS — L03.116 CELLULITIS OF LEFT KNEE: Primary | ICD-10-CM

## 2022-08-23 DIAGNOSIS — M25.551 RIGHT HIP PAIN: ICD-10-CM

## 2022-08-23 PROCEDURE — G8420 CALC BMI NORM PARAMETERS: HCPCS | Performed by: FAMILY MEDICINE

## 2022-08-23 PROCEDURE — 1123F ACP DISCUSS/DSCN MKR DOCD: CPT | Performed by: FAMILY MEDICINE

## 2022-08-23 PROCEDURE — G8427 DOCREV CUR MEDS BY ELIG CLIN: HCPCS | Performed by: FAMILY MEDICINE

## 2022-08-23 PROCEDURE — G8399 PT W/DXA RESULTS DOCUMENT: HCPCS | Performed by: FAMILY MEDICINE

## 2022-08-23 PROCEDURE — 1090F PRES/ABSN URINE INCON ASSESS: CPT | Performed by: FAMILY MEDICINE

## 2022-08-23 PROCEDURE — 1036F TOBACCO NON-USER: CPT | Performed by: FAMILY MEDICINE

## 2022-08-23 PROCEDURE — 99214 OFFICE O/P EST MOD 30 MIN: CPT | Performed by: FAMILY MEDICINE

## 2022-08-23 RX ORDER — DOXYCYCLINE HYCLATE 100 MG
100 TABLET ORAL 2 TIMES DAILY
Qty: 14 TABLET | Refills: 0 | Status: SHIPPED | OUTPATIENT
Start: 2022-08-23 | End: 2022-08-30

## 2022-08-30 ENCOUNTER — HOSPITAL ENCOUNTER (OUTPATIENT)
Dept: NON INVASIVE DIAGNOSTICS | Age: 82
Discharge: HOME | End: 2022-08-30
Payer: COMMERCIAL

## 2022-08-30 DIAGNOSIS — I50.22 CHRONIC SYSTOLIC HEART FAILURE (HCC): ICD-10-CM

## 2022-08-30 LAB
LV EF: 44 %
LVEF MODALITY: NORMAL

## 2022-08-30 PROCEDURE — 3430000000 HC RX DIAGNOSTIC RADIOPHARMACEUTICAL: Performed by: CLINICAL NURSE SPECIALIST

## 2022-08-30 PROCEDURE — A9502 TC99M TETROFOSMIN: HCPCS | Performed by: CLINICAL NURSE SPECIALIST

## 2022-08-30 PROCEDURE — 6360000002 HC RX W HCPCS: Performed by: INTERNAL MEDICINE

## 2022-08-30 PROCEDURE — 78452 HT MUSCLE IMAGE SPECT MULT: CPT | Performed by: INTERNAL MEDICINE

## 2022-08-30 PROCEDURE — 93017 CV STRESS TEST TRACING ONLY: CPT | Performed by: INTERNAL MEDICINE

## 2022-08-30 PROCEDURE — 6360000002 HC RX W HCPCS: Performed by: CLINICAL NURSE SPECIALIST

## 2022-08-30 RX ORDER — AMINOPHYLLINE DIHYDRATE 25 MG/ML
100 INJECTION, SOLUTION INTRAVENOUS ONCE
Status: COMPLETED | OUTPATIENT
Start: 2022-08-30 | End: 2022-08-30

## 2022-08-30 RX ADMIN — REGADENOSON 0.4 MG: 0.08 INJECTION, SOLUTION INTRAVENOUS at 10:50

## 2022-08-30 RX ADMIN — AMINOPHYLLINE 100 MG: 25 INJECTION, SOLUTION INTRAVENOUS at 11:06

## 2022-08-30 RX ADMIN — TETROFOSMIN 10 MILLICURIE: 1.38 INJECTION, POWDER, LYOPHILIZED, FOR SOLUTION INTRAVENOUS at 08:54

## 2022-08-30 RX ADMIN — TETROFOSMIN 30 MILLICURIE: 1.38 INJECTION, POWDER, LYOPHILIZED, FOR SOLUTION INTRAVENOUS at 10:57

## 2022-08-30 NOTE — PROGRESS NOTES
Instructed on Lexiscan Stress Test Procedure including possible side effects/ adverse reactions. Patient verbalizes  understanding and denies having any questions. See [address] [place] [address] Rd Cardiology.   Talita Senior RN

## 2022-08-31 ENCOUNTER — TELEPHONE (OUTPATIENT)
Dept: CARDIOLOGY CLINIC | Age: 82
End: 2022-08-31

## 2022-08-31 NOTE — TELEPHONE ENCOUNTER
Stress test reviewed with Dr Missy Singleton  I spoke with Bety and she will have Dr Nolberto Navarrete review on Tuesday regarding C.   Patient has renal insufficiency as well  I spoke to patient and she understands that Bety will call her next week

## 2022-09-01 ENCOUNTER — APPOINTMENT (OUTPATIENT)
Dept: GENERAL RADIOLOGY | Age: 82
End: 2022-09-01
Payer: COMMERCIAL

## 2022-09-01 ENCOUNTER — NURSE ONLY (OUTPATIENT)
Dept: CARDIOLOGY CLINIC | Age: 82
End: 2022-09-01
Payer: MEDICARE

## 2022-09-01 ENCOUNTER — HOSPITAL ENCOUNTER (EMERGENCY)
Age: 82
Discharge: HOME | End: 2022-09-01
Attending: EMERGENCY MEDICINE
Payer: COMMERCIAL

## 2022-09-01 VITALS
OXYGEN SATURATION: 99 % | DIASTOLIC BLOOD PRESSURE: 61 MMHG | BODY MASS INDEX: 21.26 KG/M2 | TEMPERATURE: 97.7 F | HEART RATE: 80 BPM | RESPIRATION RATE: 18 BRPM | SYSTOLIC BLOOD PRESSURE: 142 MMHG | WEIGHT: 120 LBS | HEIGHT: 63 IN

## 2022-09-01 DIAGNOSIS — I49.5 SICK SINUS SYNDROME (HCC): Primary | ICD-10-CM

## 2022-09-01 DIAGNOSIS — I49.9 IRREGULAR HEART BEAT: Primary | ICD-10-CM

## 2022-09-01 DIAGNOSIS — Z95.0 PACEMAKER: ICD-10-CM

## 2022-09-01 LAB
A/G RATIO: 1.8 (ref 1.1–2.2)
ALBUMIN SERPL-MCNC: 3.9 G/DL (ref 3.4–5)
ALP BLD-CCNC: 106 U/L (ref 40–129)
ALT SERPL-CCNC: <5 U/L (ref 10–40)
ANION GAP SERPL CALCULATED.3IONS-SCNC: 12 MMOL/L (ref 3–16)
AST SERPL-CCNC: 13 U/L (ref 15–37)
BASOPHILS ABSOLUTE: 0.1 K/UL (ref 0–0.2)
BASOPHILS RELATIVE PERCENT: 1.2 %
BILIRUB SERPL-MCNC: 0.4 MG/DL (ref 0–1)
BUN BLDV-MCNC: 67 MG/DL (ref 7–20)
CALCIUM SERPL-MCNC: 9.5 MG/DL (ref 8.3–10.6)
CHLORIDE BLD-SCNC: 116 MMOL/L (ref 99–110)
CO2: 19 MMOL/L (ref 21–32)
CREAT SERPL-MCNC: 1.8 MG/DL (ref 0.6–1.2)
EOSINOPHILS ABSOLUTE: 0.5 K/UL (ref 0–0.6)
EOSINOPHILS RELATIVE PERCENT: 5.7 %
GFR AFRICAN AMERICAN: 33
GFR NON-AFRICAN AMERICAN: 27
GLUCOSE BLD-MCNC: 96 MG/DL (ref 70–99)
HCT VFR BLD CALC: 44.2 % (ref 36–48)
HEMOGLOBIN: 13.4 G/DL (ref 12–16)
LYMPHOCYTES ABSOLUTE: 1.3 K/UL (ref 1–5.1)
LYMPHOCYTES RELATIVE PERCENT: 15.3 %
MCH RBC QN AUTO: 26.9 PG (ref 26–34)
MCHC RBC AUTO-ENTMCNC: 30.4 G/DL (ref 31–36)
MCV RBC AUTO: 88.4 FL (ref 80–100)
MONOCYTES ABSOLUTE: 0.6 K/UL (ref 0–1.3)
MONOCYTES RELATIVE PERCENT: 7.3 %
NEUTROPHILS ABSOLUTE: 6.2 K/UL (ref 1.7–7.7)
NEUTROPHILS RELATIVE PERCENT: 70.5 %
PDW BLD-RTO: 20.5 % (ref 12.4–15.4)
PLATELET # BLD: 385 K/UL (ref 135–450)
PMV BLD AUTO: 9.1 FL (ref 5–10.5)
POTASSIUM REFLEX MAGNESIUM: 4.1 MMOL/L (ref 3.5–5.1)
RBC # BLD: 5 M/UL (ref 4–5.2)
SODIUM BLD-SCNC: 147 MMOL/L (ref 136–145)
TOTAL PROTEIN: 6.1 G/DL (ref 6.4–8.2)
TROPONIN: <0.01 NG/ML
WBC # BLD: 8.8 K/UL (ref 4–11)

## 2022-09-01 PROCEDURE — 93288 INTERROG EVL PM/LDLS PM IP: CPT | Performed by: INTERNAL MEDICINE

## 2022-09-01 PROCEDURE — 84484 ASSAY OF TROPONIN QUANT: CPT

## 2022-09-01 PROCEDURE — 99285 EMERGENCY DEPT VISIT HI MDM: CPT

## 2022-09-01 PROCEDURE — 80053 COMPREHEN METABOLIC PANEL: CPT

## 2022-09-01 PROCEDURE — 93005 ELECTROCARDIOGRAM TRACING: CPT | Performed by: EMERGENCY MEDICINE

## 2022-09-01 PROCEDURE — 85025 COMPLETE CBC W/AUTO DIFF WBC: CPT

## 2022-09-01 PROCEDURE — 36415 COLL VENOUS BLD VENIPUNCTURE: CPT

## 2022-09-01 PROCEDURE — 71046 X-RAY EXAM CHEST 2 VIEWS: CPT

## 2022-09-01 ASSESSMENT — PAIN - FUNCTIONAL ASSESSMENT: PAIN_FUNCTIONAL_ASSESSMENT: NONE - DENIES PAIN

## 2022-09-01 NOTE — ED PROVIDER NOTES
[address] PROVIDER NOTE    Patient Identification  Pt Name: Kareem Nation  MRN: [id number]  Angel 1940  Date of evaluation: 9/1/2022  Provider: Yudith Walsh MD  PCP: Lina Ramos MD    Chief Complaint  Skipped beats    HPI  History provided by patient   This is a 80 y.o. female who presents to the ED for sensation of skipping heartbeats. Has been ongoing all evening. Was intense. Is now resolved. Has had this a few times in the past but does not get it often. No recent illness. No fevers or chills. Has longstanding dizziness which is unchanged. No nausea or vomiting. No chest discomfort. Denies any pain. No shortness of breath. No changes in her medications. She is due to get a left heart cath on Tuesday. ROS  12 systems reviewed, pertinent positives/negatives per HPI otherwise noted to be negative.     I have reviewed the following nursing documentation:  Allergies: Aspirin, Diltiazem, Diltiazem hcl, Lorazepam, Sulfa antibiotics, Atorvastatin, Dabigatran, Mysoline [primidone], Nsaids, Other, and Primidone    Past medical history:   Past Medical History:   Diagnosis Date    Allergic rhinitis, cause unspecified     Arthritis     Atrial fibrillation ([facility] [address]     Bronchopneumonia     CAD (coronary artery disease)     stent:  post cataract surgery (CABG)    Cerebral artery occlusion with cerebral infarction [facility])     TIA    Chronic gouty arthropathy     Chronic kidney disease     Congestive heart failure, unspecified     Degeneration of cervical intervertebral disc     Essential and other specified forms of tremor     Gout     HIGH CHOLESTEROL     History of CVA (cerebrovascular accident)     Hx of blood clots     Hypertension     Influenza 12/23/2017    Leakage of Watchman left atrial appendage closure device     Mitral valve stenosis and aortic valve insufficiency     Movement disorder     back problems    Pacemaker     Peptic ulcer, unspecified site, unspecified as acute or chronic, without mention of hemorrhage, perforation, or obstruction     Thyroid disease     Unspecified disorder of kidney and ureter     Unspecified hypothyroidism      Past surgical history:   Past Surgical History:   Procedure Laterality Date    BACK SURGERY      CARDIAC CATHETERIZATION  7/2012    CARDIAC CATHETERIZATION  08/07/2018    Unsuccesful  of RCA    CARDIAC SURGERY      CABG & Cardiac ablation    CHOLECYSTECTOMY      CORONARY ARTERY BYPASS GRAFT  1987    LIMA- Diag/LAD, SVG- RCA    FEMORAL BYPASS Left 8/22/2019    LEFT FEMORAL TO POPLITEAL BYPASS GRAFT performed by Krystyna Campbell MD at [address] [place] [address] GRAFT N/A 8/22/2019    FEMORAL TO FEMORAL BYPASS performed by Krystyna Campbell MD at [facility] 03/16/2017    Left hip pinning    IR KYPHOPLASTY LUMBAR FIRST LEVEL  5/14/2021    IR KYPHOPLASTY LUMBAR FIRST LEVEL 5/14/2021 [facility] SPECIAL PROCEDURES    JOINT REPLACEMENT      OK NJX AA&/STRD TFRML EPI LUMBAR/SACRAL 1 LEVEL Right 12/3/2018    RIGHT L3 AND L4 LUMBAR TRANSFORAMINAL EPIRUAL STEROID INJECTION WITH FLUOROSCOPY performed by Jose Payan MD at [facility]    PTCA  11/04/2014    MARLENY - 3.0 x 28 to the Ist Diag    SHOULDER SURGERY      left       Home medications:   Previous Medications    ALBUTEROL SULFATE HFA (VENTOLIN HFA) 108 (90 BASE) MCG/ACT INHALER    Inhale 2 puffs into the lungs 4 times daily as needed for Wheezing    ALLOPURINOL (ZYLOPRIM) 100 MG TABLET    TAKE 1 TABLET EVERY DAY    ASPIRIN EC 81 MG EC TABLET    Take 1 tablet by mouth in the morning. CLOPIDOGREL (PLAVIX) 75 MG TABLET    Take 1 tablet by mouth in the morning. FLUTICASONE (FLONASE) 50 MCG/ACT NASAL SPRAY    2 sprays by Each Nostril route daily    HYDROCODONE-ACETAMINOPHEN (NORCO) 5-325 MG PER TABLET    Take 1 tablet by mouth 4 times daily as needed for Pain for up to 30 days.     LEVOTHYROXINE (SYNTHROID) 125 MCG TABLET    Take 1 tablet by mouth Daily METOPROLOL SUCCINATE (TOPROL XL) 25 MG EXTENDED RELEASE TABLET    Take 0.5 tablets by mouth daily . 5 tablet daily    NITROGLYCERIN (NITROLINGUAL) 0.4 MG/SPRAY 0.4 MG SPRAY    Place 1 spray under the tongue every 5 minutes as needed for Chest pain    TOPIRAMATE (TOPAMAX) 50 MG TABLET    TAKE 2 TABLETS TWICE DAILY    TORSEMIDE (DEMADEX) 10 MG TABLET    20 mg 2 days of the week and 10 mg the rest    VITAMIN D (ERGOCALCIFEROL) 1.25 MG (00591 UT) CAPS CAPSULE    TAKE 1 CAPSULE ONCE A WEEK       Social history:  reports that she quit smoking about 35 years ago. Her smoking use included cigarettes. She has a 28.00 pack-year smoking history. She has never used smokeless tobacco. She reports that she does not currently use alcohol. She reports that she does not use drugs. Family history:    Family History   Problem Relation Age of Onset    Cancer Sister     Heart Disease Sister     Diabetes Brother     Hypertension Brother     Heart Disease Brother     Stroke Maternal Aunt     Heart Disease Maternal Aunt     Diabetes Maternal Uncle     Hypertension Paternal Aunt     Cancer Maternal Grandmother     Cancer Paternal Grandmother     Rheum Arthritis Neg Hx     Lupus Neg Hx          Exam  ED Triage Vitals   BP Temp Temp src Pulse Resp SpO2 Height Weight   -- -- -- -- -- -- -- --     Nursing note and vitals reviewed. Constitutional: In no acute distress  HENT:      Head: Normocephalic      Ears: External ears normal.      Nose: Nose normal.     Mouth: Membrane mucosa moist   Eyes: No discharge. Neck: Supple. Trachea midline. Cardiovascular: Regular rate. Warm extremities  Pulmonary/Chest: Effort normal. No respiratory distress. ctabl  Abdominal: Soft. No distension. Nontender  : Deferred  Rectal: Deferred   Musculoskeletal: Moves all extremities. No gross deformity. Neurological: Alert and oriented. Face symmetric. Speech is clear. Skin: Warm and dry. Psychiatric: Normal mood and affect.  Behavior is 196 ms    QRS Duration 184 ms    Q-T Interval 478 ms    QTc Calculation (Bazett) 555 ms    P Axis 64 degrees    R Axis -73 degrees    T Axis 98 degrees    Diagnosis Electronic ventricular pacemaker        Screenings   Ronit Coma Scale  Eye Opening: Spontaneous  Best Verbal Response: Oriented  Best Motor Response: Obeys commands  [place] Coma Scale Score: 15       MDM and ED Course  This is a 80 y.o. female who presents to the ED for sensation of irregular heartbeat. Patient does have history of paroxysmal A. fib. SHe is not anticoagulated. She does have watchman device. Will obtain EKG. No chest or back discomfort or shortness of breath to indicate ACS. ,  Pulmonary embolism, or dissection. She has no weakness. Vital signs look good at this time. ---------    Troponin negative. Chest x-ray shows mild venous congestion. Patient on reassessment denies any chest discomfort or shortness of breath. Remains asymptomatic. EKG shows paced rhythm. Will consult cardiology since she had abnormal stress testing.    --------    Discussed with Dr. Ese Bradford. Agree since patient is asymptomatic, never had chest pain, unremarkable troponin/EKG, that patient is fine to follow-up in clinic with her cardiologist.       [unfilled]    Is this patient to be included in the SEP-1 Core Measure due to severe sepsis or septic shock? No   Exclusion criteria - the patient is NOT to be included for SEP-1 Core Measure due to: Infection is not suspected        Final Impression  1. Irregular heart beat        Blood pressure (!) 142/61, pulse 80, temperature 97.7 °F (36.5 °C), temperature source Oral, resp. rate 18, height 5' 3\" (1.6 m), weight 120 lb (54.4 kg), SpO2 99 %, not currently breastfeeding. Disposition:  DISPOSITION Decision To Discharge 09/01/2022 10:17:02 AM      Patient Referrals:  No follow-up provider specified.     Discharge Medications:  New Prescriptions    No medications on file       Discontinued Medications:  Discontinued Medications    No medications on file       This chart was generated using the [address] [place] [address] [address]  dictation system. I created this record but it may contain dictation errors given the limitations of this technology.         Lionel Jones MD  09/01/22 1022

## 2022-09-01 NOTE — DISCHARGE INSTRUCTIONS
Please return if you have any new, worsening, or concerning symptoms like inability to eat/drink/walk, fevers, chest pain, trouble breathing. Contact your primary care physician tomorrow to make a follow up appointment this week. You must discuss this visit and any labwork/imaging, since there may be incidental findings. If you are unable to arrange follow up, you should return to the emergency room. Until you see your doctor, you should avoid strenuous/heavy activity.

## 2022-09-01 NOTE — PROGRESS NOTES
We received remote transmission from [employer] system at [facility]. Transmission shows normal sensing and pacing function. Noted AT. Pt has history of AF. Off anti coags. S/p Watchman. EP physician will review. See interrogation under cardiology tab in the [facility] [address] field for more details. AP 94%   3%    End of 91-day monitoring period 9-1-22.

## 2022-09-01 NOTE — ED NOTES
Patient continuously going into wide complex rhythms  Patient denies s/s during these events  Pacemaker interrogated  Repeat EKG performed by this RN  Shown to MD FREDERIC aware of frequent rhythms  Awaiting report from [address] [place] [address]  09/01/22 8970

## 2022-09-02 LAB
EKG ATRIAL RATE: 81 BPM
EKG DIAGNOSIS: NORMAL
EKG P AXIS: 64 DEGREES
EKG P-R INTERVAL: 196 MS
EKG Q-T INTERVAL: 478 MS
EKG QRS DURATION: 184 MS
EKG QTC CALCULATION (BAZETT): 555 MS
EKG R AXIS: -73 DEGREES
EKG T AXIS: 98 DEGREES
EKG VENTRICULAR RATE: 81 BPM

## 2022-09-02 PROCEDURE — 93010 ELECTROCARDIOGRAM REPORT: CPT | Performed by: INTERNAL MEDICINE

## 2022-09-06 ENCOUNTER — OFFICE VISIT (OUTPATIENT)
Dept: ORTHOPEDIC SURGERY | Age: 82
End: 2022-09-06
Payer: MEDICARE

## 2022-09-06 VITALS — HEIGHT: 63 IN | BODY MASS INDEX: 21.26 KG/M2 | WEIGHT: 120 LBS

## 2022-09-06 DIAGNOSIS — R94.39 ABNORMAL STRESS TEST: Primary | ICD-10-CM

## 2022-09-06 DIAGNOSIS — M25.551 RIGHT HIP PAIN: Primary | ICD-10-CM

## 2022-09-06 PROCEDURE — 1036F TOBACCO NON-USER: CPT | Performed by: ORTHOPAEDIC SURGERY

## 2022-09-06 PROCEDURE — 99204 OFFICE O/P NEW MOD 45 MIN: CPT | Performed by: ORTHOPAEDIC SURGERY

## 2022-09-06 PROCEDURE — G8399 PT W/DXA RESULTS DOCUMENT: HCPCS | Performed by: ORTHOPAEDIC SURGERY

## 2022-09-06 PROCEDURE — G8420 CALC BMI NORM PARAMETERS: HCPCS | Performed by: ORTHOPAEDIC SURGERY

## 2022-09-06 PROCEDURE — 1090F PRES/ABSN URINE INCON ASSESS: CPT | Performed by: ORTHOPAEDIC SURGERY

## 2022-09-06 PROCEDURE — G8427 DOCREV CUR MEDS BY ELIG CLIN: HCPCS | Performed by: ORTHOPAEDIC SURGERY

## 2022-09-06 PROCEDURE — 1123F ACP DISCUSS/DSCN MKR DOCD: CPT | Performed by: ORTHOPAEDIC SURGERY

## 2022-09-06 RX ORDER — METHYLPREDNISOLONE 4 MG/1
2 TABLET ORAL DAILY
Qty: 3 TABLET | Refills: 0 | Status: ON HOLD
Start: 2022-09-06 | End: 2022-09-16 | Stop reason: HOSPADM

## 2022-09-06 NOTE — TELEPHONE ENCOUNTER
Spoke with daughter, Sharona Cathi is scheduled for 9-12-22/7:00 - 11:00 am with [facility] for LHC and 4 hrs of hydration.

## 2022-09-06 NOTE — TELEPHONE ENCOUNTER
Reviewed patient's chart with GONZALO, he agrees patient needs a LHC. He would like patient to have 4 hours of hydration prior to [facility]. Relayed this to the patient and reviewed instructions. Patient would like Daughter informed of this and daughter to schedule procedure. I spoke with daughter and relayed all the instructions and told her Milagros would call to schedule. Instructed her to call with any other questions. Order placed in [address] [place] [address] Rd.     Please call daughter to schedule LHC- Cinthia Murray [phone]

## 2022-09-08 RX ORDER — SODIUM CHLORIDE 0.9 % (FLUSH) 0.9 %
5-40 SYRINGE (ML) INJECTION PRN
Status: CANCELLED | OUTPATIENT
Start: 2022-09-08

## 2022-09-08 NOTE — PROGRESS NOTES
9/6/2022     Reason for visit:  Right hip pain    History of Present Illness: The patient is an 26-year-old female who presents for evaluation of her right hip. She reports pain on and off for quite some time. No recent traumatic injury. She localized the pain to the posterior aspect of the hip as well as lower back. The pain is made worse with standing, walking, stairs. No numbness or tingling.     Medical History:  Past Medical History:   Diagnosis Date    Allergic rhinitis, cause unspecified     Arthritis     Atrial fibrillation (HCC)     Bronchopneumonia     CAD (coronary artery disease)     stent:  post cataract surgery (CABG)    Cerebral artery occlusion with cerebral infarction [facility])     TIA    Chronic gouty arthropathy     Chronic kidney disease     Congestive heart failure, unspecified     Degeneration of cervical intervertebral disc     Essential and other specified forms of tremor     Gout     HIGH CHOLESTEROL     History of CVA (cerebrovascular accident)     Hx of blood clots     Hypertension     Influenza 12/23/2017    Leakage of Watchman left atrial appendage closure device     Mitral valve stenosis and aortic valve insufficiency     Movement disorder     back problems    Pacemaker     Peptic ulcer, unspecified site, unspecified as acute or chronic, without mention of hemorrhage, perforation, or obstruction     Thyroid disease     Unspecified disorder of kidney and ureter     Unspecified hypothyroidism       Past Surgical History:   Procedure Laterality Date    BACK SURGERY      CARDIAC CATHETERIZATION  7/2012    CARDIAC CATHETERIZATION  08/07/2018    Unsuccesful  of RCA    CARDIAC SURGERY      CABG & Cardiac ablation    CHOLECYSTECTOMY      CORONARY ARTERY BYPASS GRAFT  1987    LIMA- Diag/LAD, SVG- RCA    FEMORAL BYPASS Left 8/22/2019    LEFT FEMORAL TO POPLITEAL BYPASS GRAFT performed by Dejon Pulido MD at [address]    FEMORAL-FEMORAL BYPASS GRAFT N/A 8/22/2019    FEMORAL TO FEMORAL BYPASS performed by Patsy Martinez MD at [address] Left 2017    Left hip pinning    IR KYPHOPLASTY LUMBAR FIRST LEVEL  2021    IR KYPHOPLASTY LUMBAR FIRST LEVEL 2021 [facility] SPECIAL PROCEDURES    JOINT REPLACEMENT      OR NJX AA&/STRD TFRML EPI LUMBAR/SACRAL 1 LEVEL Right 12/3/2018    RIGHT L3 AND L4 LUMBAR TRANSFORAMINAL EPIRUAL STEROID INJECTION WITH FLUOROSCOPY performed by Delroy Momin MD at [address]    PTCA  2014    MARLENY - 3.0 x 28 to the Ist [address]      left      Family History   Problem Relation Age of Onset    Cancer Sister     Heart Disease Sister     Diabetes Brother     Hypertension Brother     Heart Disease Brother     Stroke Maternal Aunt     Heart Disease Maternal Aunt     Diabetes Maternal Uncle     Hypertension Paternal Aunt     Cancer Maternal Grandmother     Cancer Paternal Grandmother     Rheum Arthritis Neg Hx     Lupus Neg Hx       Social History     Socioeconomic History    Marital status:       Spouse name: Gia Reilly    Number of children: 3    Years of education: 12    Highest education level: Not on file   Occupational History    Not on file   Tobacco Use    Smoking status: Former     Packs/day: 1.00     Years: 28.00     Pack years: 28.00     Types: Cigarettes     Quit date: 1987     Years since quittin.7    Smokeless tobacco: Never    Tobacco comments:     H.O.smoking at age 15 / smoked up to 1 p.p.d / quit    Vaping Use    Vaping Use: Never used   Substance and Sexual Activity    Alcohol use: Not Currently     Alcohol/week: 0.0 standard drinks    Drug use: No    Sexual activity: Not Currently   Other Topics Concern    Not on file   Social History Narrative    Not on file     Social Determinants of Health     Financial Resource Strain: Low Risk     Difficulty of Paying Living Expenses: Not hard at all   Food Insecurity: No Food Insecurity    Worried About [employer] in the Last Year: Never true    Ran Out of Food in the Last Year: Never true   Transportation Needs: No Transportation Needs    Lack of Transportation (Medical): No    Lack of Transportation (Non-Medical): No   Physical Activity: Inactive    Days of Exercise per Week: 0 days    Minutes of Exercise per Session: 0 min   Stress: No Stress Concern Present    Feeling of Stress : Not at all   Social Connections: Socially Isolated    Frequency of Communication with Friends and Family: Three times a week    Frequency of Social Gatherings with Friends and Family: Three times a week    Attends Shinto Services: Never    Active Member of Clubs or Organizations: No    Attends Club or Organization Meetings: Never    Marital Status:    Intimate Partner Violence: Not on file   Housing Stability: Low Risk     Unable to Pay for Housing in the Last Year: No    Number of Places Lived in the Last Year: 1    Unstable Housing in the Last Year: No      Current Outpatient Medications on File Prior to Visit   Medication Sig Dispense Refill    HYDROcodone-acetaminophen (NORCO) 5-325 MG per tablet Take 1 tablet by mouth 4 times daily as needed for Pain for up to 30 days. 120 tablet 0    torsemide (DEMADEX) 10 MG tablet 20 mg 2 days of the week and 10 mg the rest 135 tablet 1    albuterol sulfate HFA (VENTOLIN HFA) 108 (90 Base) MCG/ACT inhaler Inhale 2 puffs into the lungs 4 times daily as needed for Wheezing 18 g 0    vitamin D (ERGOCALCIFEROL) 1.25 MG (69690 UT) CAPS capsule TAKE 1 CAPSULE ONCE A WEEK 12 capsule 1    aspirin EC 81 MG EC tablet Take 1 tablet by mouth in the morning. 90 tablet 1    clopidogrel (PLAVIX) 75 MG tablet Take 1 tablet by mouth in the morning.  30 tablet 3    allopurinol (ZYLOPRIM) 100 MG tablet TAKE 1 TABLET EVERY DAY 90 tablet 1    levothyroxine (SYNTHROID) 125 MCG tablet Take 1 tablet by mouth Daily 90 tablet 1    topiramate (TOPAMAX) 50 MG tablet TAKE 2 TABLETS TWICE DAILY 360 tablet 1    nitroGLYCERIN (NITROLINGUAL) 0.4 MG/SPRAY 0.4 mg spray Place 1 spray under the tongue every 5 minutes as needed for Chest pain 4.9 g 1    fluticasone (FLONASE) 50 MCG/ACT nasal spray 2 sprays by Each Nostril route daily 16 g 0    metoprolol succinate (TOPROL XL) 25 MG extended release tablet Take 0.5 tablets by mouth daily . 5 tablet daily 45 tablet 0     No current facility-administered medications on file prior to visit. Allergies   Allergen Reactions    Aspirin Nausea Only    Diltiazem Anaphylaxis    Diltiazem Hcl Anaphylaxis    Lorazepam Other (See Comments)     hallucinations  hallucinations  hallucinations    Sulfa Antibiotics Rash and Hives    Atorvastatin Other (See Comments)     Muscle pains  Muscle pains  Muscle pains    Dabigatran Nausea Only     And indigestion  And indigestion    Aka Pradaxa  And indigestion  And indigestion  And indigestion    Aka Pradaxa  And indigestion  And indigestion  And indigestion    Aka Pradaxa    Mysoline [Primidone]     Nsaids      Other reaction(s): Unknown    Other Other (See Comments)     Nitroglycerin patches causes severe headaches    Primidone      Other reaction(s): Unknown        Review of Systems:  Constitutional: Patient is adequately groomed with no evidence of malnutrition  Mental Status: The patient is oriented to time, place and person. The patient's mood and affect are appropriate. Lymphatic: The lymphatic examination bilaterally reveals all areas to be without enlargement or induration. Vascular: Examination reveals no swelling or calf tenderness. Peripheral pulses are palpable and 2+. Neurological: The patient has good coordination. There is no weakness or sensory deficit. Skin:  Head/Neck: inspection reveals no rashes, ulcerations or lesions. Trunk: inspection reveals no rashes, ulcerations or lesions.     Objective:  Ht 5' 3\" (1.6 m)   Wt 120 lb (54.4 kg)   BMI 21.26 kg/m²      Physical Exam:  The patient is well-appearing and in no apparent distress  Examination of the right hip  Mild pain with motion but has a full range of motion, tenderness to palpation over the trochanteric region  5 out of 5 strength throughout distal muscle groups  Sensation is intact to light touch throughout all distributions  There is no calf swelling or tenderness  Palpable DP pulse, brisk cap refill, 2+ symmetric reflexes     Imaging:  AP pelvis x-ray and 2 view x-rays of the right hip were obtained in the office today on 9/6/2022. There is no fracture or dislocation. Preserved joint space. Degenerative changes of lumbar spine. Assessment:  Suspect lumbar spine pain with lumbar radiculopathy    Plan:  I discussed with the patient the diagnosis and treatment options. We discussed operative and nonoperative management. At this point I do recommend nonoperative management. Nonoperative treatment options include activity modification, anti-inflammatory medications, physical therapy, and injections. Start physical therapy for her lumbar spine. We will also give a Medrol Dosepak. The patient return as needed. If she does remain symptomatic she will call us and neck step would be an MRI of the lumbar spine. Greater than 45 minutes were spent with this encounter. Time spent included evaluating the patient's chart prior to arrival.  Evaluating the patient in the office including history, physical examination, imaging reviewing, and counseling on next steps. Lastly, time was spent discussing orders with my staff as well as providing documentation in the chart. Nanci Spencer MD            Orthopaedic Surgery Sports Medicine and [address]  and [facility] Physician [facility] ([place]      Disclaimer: This note was dictated with voice recognition software. Though review and correction are routine, we apologize for any errors.

## 2022-09-10 ENCOUNTER — APPOINTMENT (OUTPATIENT)
Dept: GENERAL RADIOLOGY | Age: 82
DRG: 286 | End: 2022-09-10
Payer: MEDICARE

## 2022-09-10 ENCOUNTER — HOSPITAL ENCOUNTER (INPATIENT)
Age: 82
LOS: 6 days | Discharge: HOME | DRG: 286 | End: 2022-09-16
Attending: EMERGENCY MEDICINE | Admitting: INTERNAL MEDICINE
Payer: MEDICARE

## 2022-09-10 DIAGNOSIS — R06.09 DOE (DYSPNEA ON EXERTION): Primary | ICD-10-CM

## 2022-09-10 LAB
A/G RATIO: 1.3 (ref 1.1–2.2)
ALBUMIN SERPL-MCNC: 3.8 G/DL (ref 3.4–5)
ALP BLD-CCNC: 91 U/L (ref 40–129)
ALT SERPL-CCNC: 9 U/L (ref 10–40)
ANION GAP SERPL CALCULATED.3IONS-SCNC: 8 MMOL/L (ref 3–16)
ANISOCYTOSIS: ABNORMAL
AST SERPL-CCNC: 31 U/L (ref 15–37)
BASOPHILS ABSOLUTE: 0.1 K/UL (ref 0–0.2)
BASOPHILS RELATIVE PERCENT: 1.2 %
BILIRUB SERPL-MCNC: 0.3 MG/DL (ref 0–1)
BUN BLDV-MCNC: 31 MG/DL (ref 7–20)
CALCIUM SERPL-MCNC: 9 MG/DL (ref 8.3–10.6)
CHLORIDE BLD-SCNC: 112 MMOL/L (ref 99–110)
CO2: 22 MMOL/L (ref 21–32)
CREAT SERPL-MCNC: 1.3 MG/DL (ref 0.6–1.2)
EOSINOPHILS ABSOLUTE: 0.4 K/UL (ref 0–0.6)
EOSINOPHILS RELATIVE PERCENT: 3.6 %
GFR AFRICAN AMERICAN: 47
GFR NON-AFRICAN AMERICAN: 39
GLUCOSE BLD-MCNC: 99 MG/DL (ref 70–99)
HCT VFR BLD CALC: 42.7 % (ref 36–48)
HEMOGLOBIN: 13.1 G/DL (ref 12–16)
LYMPHOCYTES ABSOLUTE: 0.9 K/UL (ref 1–5.1)
LYMPHOCYTES RELATIVE PERCENT: 9.1 %
MCH RBC QN AUTO: 26.5 PG (ref 26–34)
MCHC RBC AUTO-ENTMCNC: 30.8 G/DL (ref 31–36)
MCV RBC AUTO: 86 FL (ref 80–100)
MONOCYTES ABSOLUTE: 0.5 K/UL (ref 0–1.3)
MONOCYTES RELATIVE PERCENT: 5 %
NEUTROPHILS ABSOLUTE: 8.2 K/UL (ref 1.7–7.7)
NEUTROPHILS RELATIVE PERCENT: 81.1 %
OVALOCYTES: ABNORMAL
PDW BLD-RTO: 20.1 % (ref 12.4–15.4)
PLATELET # BLD: 359 K/UL (ref 135–450)
PLATELET SLIDE REVIEW: ADEQUATE
PMV BLD AUTO: 9.2 FL (ref 5–10.5)
POTASSIUM REFLEX MAGNESIUM: 4.7 MMOL/L (ref 3.5–5.1)
PRO-BNP: 7004 PG/ML (ref 0–449)
RBC # BLD: 4.96 M/UL (ref 4–5.2)
SLIDE REVIEW: ABNORMAL
SODIUM BLD-SCNC: 142 MMOL/L (ref 136–145)
TOTAL PROTEIN: 6.7 G/DL (ref 6.4–8.2)
TROPONIN: <0.01 NG/ML
TROPONIN: <0.01 NG/ML
WBC # BLD: 10.1 K/UL (ref 4–11)

## 2022-09-10 PROCEDURE — 99285 EMERGENCY DEPT VISIT HI MDM: CPT

## 2022-09-10 PROCEDURE — 84484 ASSAY OF TROPONIN QUANT: CPT

## 2022-09-10 PROCEDURE — 71045 X-RAY EXAM CHEST 1 VIEW: CPT

## 2022-09-10 PROCEDURE — 6370000000 HC RX 637 (ALT 250 FOR IP): Performed by: INTERNAL MEDICINE

## 2022-09-10 PROCEDURE — 2580000003 HC RX 258: Performed by: INTERNAL MEDICINE

## 2022-09-10 PROCEDURE — 85025 COMPLETE CBC W/AUTO DIFF WBC: CPT

## 2022-09-10 PROCEDURE — 96374 THER/PROPH/DIAG INJ IV PUSH: CPT

## 2022-09-10 PROCEDURE — 83880 ASSAY OF NATRIURETIC PEPTIDE: CPT

## 2022-09-10 PROCEDURE — 93005 ELECTROCARDIOGRAM TRACING: CPT | Performed by: EMERGENCY MEDICINE

## 2022-09-10 PROCEDURE — 36415 COLL VENOUS BLD VENIPUNCTURE: CPT

## 2022-09-10 PROCEDURE — 80053 COMPREHEN METABOLIC PANEL: CPT

## 2022-09-10 PROCEDURE — 6360000002 HC RX W HCPCS: Performed by: PHYSICIAN ASSISTANT

## 2022-09-10 PROCEDURE — 6370000000 HC RX 637 (ALT 250 FOR IP): Performed by: PHYSICIAN ASSISTANT

## 2022-09-10 PROCEDURE — 2060000000 HC ICU INTERMEDIATE R&B

## 2022-09-10 RX ORDER — LEVOTHYROXINE SODIUM 0.12 MG/1
125 TABLET ORAL DAILY
Status: DISCONTINUED | OUTPATIENT
Start: 2022-09-11 | End: 2022-09-16 | Stop reason: HOSPADM

## 2022-09-10 RX ORDER — ASPIRIN 81 MG/1
81 TABLET ORAL DAILY
Status: DISCONTINUED | OUTPATIENT
Start: 2022-09-10 | End: 2022-09-16 | Stop reason: HOSPADM

## 2022-09-10 RX ORDER — ACETAMINOPHEN 650 MG/1
650 SUPPOSITORY RECTAL EVERY 6 HOURS PRN
Status: DISCONTINUED | OUTPATIENT
Start: 2022-09-10 | End: 2022-09-16 | Stop reason: HOSPADM

## 2022-09-10 RX ORDER — ENOXAPARIN SODIUM 100 MG/ML
30 INJECTION SUBCUTANEOUS DAILY
Status: DISCONTINUED | OUTPATIENT
Start: 2022-09-11 | End: 2022-09-16 | Stop reason: HOSPADM

## 2022-09-10 RX ORDER — HYDROCODONE BITARTRATE AND ACETAMINOPHEN 5; 325 MG/1; MG/1
1 TABLET ORAL 4 TIMES DAILY PRN
Status: DISCONTINUED | OUTPATIENT
Start: 2022-09-10 | End: 2022-09-16 | Stop reason: HOSPADM

## 2022-09-10 RX ORDER — ALBUTEROL SULFATE 90 UG/1
2 AEROSOL, METERED RESPIRATORY (INHALATION) 4 TIMES DAILY PRN
Status: DISCONTINUED | OUTPATIENT
Start: 2022-09-10 | End: 2022-09-16 | Stop reason: HOSPADM

## 2022-09-10 RX ORDER — FUROSEMIDE 10 MG/ML
40 INJECTION INTRAMUSCULAR; INTRAVENOUS ONCE
Status: COMPLETED | OUTPATIENT
Start: 2022-09-10 | End: 2022-09-10

## 2022-09-10 RX ORDER — FLUTICASONE PROPIONATE 50 MCG
2 SPRAY, SUSPENSION (ML) NASAL DAILY
Status: DISCONTINUED | OUTPATIENT
Start: 2022-09-10 | End: 2022-09-16 | Stop reason: HOSPADM

## 2022-09-10 RX ORDER — SODIUM CHLORIDE 0.9 % (FLUSH) 0.9 %
5-40 SYRINGE (ML) INJECTION PRN
Status: DISCONTINUED | OUTPATIENT
Start: 2022-09-10 | End: 2022-09-16 | Stop reason: HOSPADM

## 2022-09-10 RX ORDER — CLOPIDOGREL BISULFATE 75 MG/1
75 TABLET ORAL DAILY
Status: DISCONTINUED | OUTPATIENT
Start: 2022-09-10 | End: 2022-09-16 | Stop reason: HOSPADM

## 2022-09-10 RX ORDER — ASPIRIN 81 MG/1
324 TABLET, CHEWABLE ORAL ONCE
Status: COMPLETED | OUTPATIENT
Start: 2022-09-10 | End: 2022-09-10

## 2022-09-10 RX ORDER — METOPROLOL SUCCINATE 25 MG/1
12.5 TABLET, EXTENDED RELEASE ORAL DAILY
Status: DISCONTINUED | OUTPATIENT
Start: 2022-09-10 | End: 2022-09-16 | Stop reason: HOSPADM

## 2022-09-10 RX ORDER — ONDANSETRON 2 MG/ML
4 INJECTION INTRAMUSCULAR; INTRAVENOUS EVERY 6 HOURS PRN
Status: DISCONTINUED | OUTPATIENT
Start: 2022-09-10 | End: 2022-09-10

## 2022-09-10 RX ORDER — ACETAMINOPHEN 325 MG/1
650 TABLET ORAL EVERY 6 HOURS PRN
Status: DISCONTINUED | OUTPATIENT
Start: 2022-09-10 | End: 2022-09-16 | Stop reason: HOSPADM

## 2022-09-10 RX ORDER — ONDANSETRON 4 MG/1
4 TABLET, ORALLY DISINTEGRATING ORAL EVERY 8 HOURS PRN
Status: DISCONTINUED | OUTPATIENT
Start: 2022-09-10 | End: 2022-09-16 | Stop reason: HOSPADM

## 2022-09-10 RX ORDER — SODIUM CHLORIDE 0.9 % (FLUSH) 0.9 %
5-40 SYRINGE (ML) INJECTION EVERY 12 HOURS SCHEDULED
Status: DISCONTINUED | OUTPATIENT
Start: 2022-09-10 | End: 2022-09-16 | Stop reason: HOSPADM

## 2022-09-10 RX ORDER — POLYETHYLENE GLYCOL 3350 17 G/17G
17 POWDER, FOR SOLUTION ORAL DAILY PRN
Status: DISCONTINUED | OUTPATIENT
Start: 2022-09-10 | End: 2022-09-16 | Stop reason: HOSPADM

## 2022-09-10 RX ORDER — SODIUM CHLORIDE 9 MG/ML
INJECTION, SOLUTION INTRAVENOUS PRN
Status: DISCONTINUED | OUTPATIENT
Start: 2022-09-10 | End: 2022-09-16 | Stop reason: HOSPADM

## 2022-09-10 RX ADMIN — ASPIRIN 81 MG 324 MG: 81 TABLET ORAL at 18:03

## 2022-09-10 RX ADMIN — Medication 10 ML: at 22:40

## 2022-09-10 RX ADMIN — FUROSEMIDE 40 MG: 10 INJECTION, SOLUTION INTRAMUSCULAR; INTRAVENOUS at 18:06

## 2022-09-10 ASSESSMENT — PAIN SCALES - GENERAL
PAINLEVEL_OUTOF10: 0

## 2022-09-10 ASSESSMENT — PAIN - FUNCTIONAL ASSESSMENT: PAIN_FUNCTIONAL_ASSESSMENT: 0-10

## 2022-09-10 NOTE — ACP (ADVANCE CARE PLANNING)
Advanced Care Planning Note. Purpose of Encounter: Advanced care planning in light of hospitalization  Parties In Attendance: Patient,    Decisional Capacity: Yes  Subjective: Patient  understand that this conversation is to address long term care goal  Objective: Patient admitted to hospital with exertional dyspnea history of coronary artery disease and paroxysmal A. fib  Goals of Care Determination: Patient would pursue CPR and Intubation if required.    Unsure about long term ventilation/tracheostomy  Code Status: full code  Time spent on Advanced care Plannin minutes  Advanced Care Planning Documents: documented patient's wishes, would like Daugther Read Jenny to make medical decisions if unable to make decisions    Chon Todd MD  9/10/2022 6:57 PM

## 2022-09-10 NOTE — ED PROVIDER NOTES
I independently saw performed a substantive portion of the visit (history, physical, and MDM) on Poala Christopher. All diagnostic, treatment, and disposition decisions were made by myself in conjunction with the advanced practice provider. I have participated in the medical decision making and directed the treatment plan and disposition of the patient. For further details of [facility] emergency department encounter, please see the advanced practice provider's documentation. Yolanda Solis MD, am the primary physician provider of record. CHIEF COMPLAINT  Chief Complaint   Patient presents with    Shortness of Breath    Atrial Fibrillation     Sent by dr Yesika Banks to have angiogram on monday       Briefly, Paola Christopher is a 80 y.o. female  who presents to the ED complaining of progressive SOB and SCHMID with impaired ADL's at this point. Supposed to have hydration preceding a LHC on Monday with Dr. Paul Gutierrez however has progressively worsened. No chest pains. FOCUSED PHYSICAL EXAMINATION  BP (!) 179/75   Pulse 70   Temp 98.8 °F (37.1 °C) (Oral)   Resp 27   Ht 5' 3\" (1.6 m)   Wt 116 lb (52.6 kg)   SpO2 99%   BMI 20.55 kg/m²    Focused physical examination notable for no acute distress, well-appearing, well-nourished, normal speech and mentation without obvious facial droop, no obvious rash. No obvious cranial nerve deficits on my initial exam. Bibasilar rales, conversational dyspnea, no wheezing, RRR, abd soft NTND.     The 12 lead EKG was interpreted by me as follows:  Rate: normal with a rate of 73  Rhythm: electronic atrial pacer and PVC  Axis: normal  Intervals: narrow QRS  ST segments: no ST elevations or depressions  T waves: no abnormal inversions  Non-specific T wave changes: present  Prior EKG comparison: EKG dated 9/1/22 is not significantly different      MDM:  Diagnostic considerations included acute coronary syndrome, pulmonary embolism, COPD/asthma, pneumonia, sepsis, pericardial tamponade, pneumothorax, CHF, thoracic aortic dissection, anxiety    ED course was notable for concern for fluid overload with exertional dyspnea, BNP elevation above baseline with a stable creatinine at 1.3 for now. Does not have any chest pain actively and a negative troponin. EKG shows a paced rhythm without acute ischemic changes. We will give a gentle dose of Lasix and admit due to progression and worsening of symptoms in anticipation of upcoming [facility]. Is this patient to be included in the SEP-1 Core Measure? No   Exclusion criteria - the patient is NOT to be included for SEP-1 Core Measure due to: Infection is not suspected      During the patient's ED course, the patient was given:  Medications   furosemide (LASIX) injection 40 mg (has no administration in time range)   aspirin chewable tablet 324 mg (has no administration in time range)        CLINICAL IMPRESSION  1. SCHMID (dyspnea on exertion)        DISPOSITION  Alice Crane was admitted in fair condition. The plan is to admit to the hospital at this time under the hospitalist service. Hospitalist accepted the patient and will take over the patient's care. This chart was created using Dragon dictation software. Efforts were made by me to ensure accuracy, however some errors may be present due to limitations of this technology.             Arcelia Julian MD  09/10/22 [id number]

## 2022-09-10 NOTE — ED NOTES
Pt [place] but able to walk to the bathroom under her own power.      Joshua Olivas RN  09/10/22 [id number]

## 2022-09-10 NOTE — H&P
HOSPITALISTS HISTORY AND PHYSICAL    9/10/2022 6:55 PM    Patient Information:  Finn Ford is a 80 y.o. female [id number]  PCP:  Cesar Herron MD (Tel: [phone] )    Chief complaint:    Chief Complaint   Patient presents with    Shortness of Breath    Atrial Fibrillation     Sent by dr Fouzia Mondragon to have angiogram on monday     History of Present Illness:  Joshua Swanson is a 80 y.o. female last 1 week has been having increasing exertional dyspnea and yesterday started having shortness of breath while she is lying down. Denies any chest pain nausea vomiting fevers or chills or palpitations with this patient did have a stress test at the end of August which was positive patient is supposed to have a cath on Monday does have a history of coronary artery disease is on Plavix and has A. fib with a history of watchman's. Does not smoke or          REVIEW OF SYSTEMS:   Constitutional: Negative for fever,chills or night sweats  ENT: Negative for rhinorrhea, epistaxis, hoarseness, sore throat. Respiratory: see above  Cardiovascular: Negative for chest pain, palpitations   Gastrointestinal: Negative for nausea, vomiting, diarrhea  Genitourinary: Negative for polyuria, dysuria   Hematologic/Lymphatic: Negative for bleeding tendency, easy bruising  Musculoskeletal: Negative for myalgias and arthralgias  Neurologic: Negative for confusion,dysarthria. Skin: Negative for itching,rash  Psychiatric: Negative for depression,anxiety, agitation. Endocrine: Negative for polydipsia,polyuria,heat /cold intolerance.     Past Medical History:   has a past medical history of Allergic rhinitis, cause unspecified, Arthritis, Atrial fibrillation ([address] Bronchopneumonia, CAD (coronary artery disease), Cerebral artery occlusion with cerebral infarction [facility]), Chronic gouty arthropathy, Chronic kidney disease, Congestive heart failure, unspecified, Degeneration of cervical intervertebral disc, Essential and other specified forms of tremor, Gout, HIGH CHOLESTEROL, History of CVA (cerebrovascular accident), Hx of blood clots, Hypertension, Influenza, Leakage of Watchman left atrial appendage closure device, Mitral valve stenosis and aortic valve insufficiency, Movement disorder, Pacemaker, Peptic ulcer, unspecified site, unspecified as acute or chronic, without mention of hemorrhage, perforation, or obstruction, Thyroid disease, Unspecified disorder of kidney and ureter, and Unspecified hypothyroidism. Past Surgical History:   has a past surgical history that includes back surgery; Cholecystectomy; Cardiac surgery; Coronary artery bypass graft (1987); shoulder surgery; Cardiac catheterization (7/2012); hip surgery (Left, 03/16/2017); joint replacement; Cardiac catheterization (08/07/2018); Percutaneous Transluminal Coronary Angio (11/04/2014); pr njx aa&/strd tfrml epi lumbar/sacral 1 level (Right, 12/3/2018); Femoral-femoral Bypass Graft (N/A, 8/22/2019); femoral bypass (Left, 8/22/2019); and IR KYPHOPLASTY LUMBAR 1 VERTEBRAL BODY (5/14/2021). Medications:  No current facility-administered medications on file prior to encounter. Current Outpatient Medications on File Prior to Encounter   Medication Sig Dispense Refill    methylPREDNISolone (MEDROL) 4 MG tablet Take 0.5 tablets by mouth daily for 6 days 3 tablet 0    HYDROcodone-acetaminophen (NORCO) 5-325 MG per tablet Take 1 tablet by mouth 4 times daily as needed for Pain for up to 30 days.  120 tablet 0    torsemide (DEMADEX) 10 MG tablet 20 mg 2 days of the week and 10 mg the rest 135 tablet 1    albuterol sulfate HFA (VENTOLIN HFA) 108 (90 Base) MCG/ACT inhaler Inhale 2 puffs into the lungs 4 times daily as needed for Wheezing 18 g 0    vitamin D (ERGOCALCIFEROL) 1.25 MG (15012 UT) CAPS capsule TAKE 1 CAPSULE ONCE A WEEK 12 capsule 1    aspirin EC 81 MG EC tablet Take 1 tablet by mouth in the morning. 90 tablet 1    clopidogrel (PLAVIX) 75 MG tablet Take 1 tablet by mouth in the morning. 30 tablet 3    allopurinol (ZYLOPRIM) 100 MG tablet TAKE 1 TABLET EVERY DAY 90 tablet 1    levothyroxine (SYNTHROID) 125 MCG tablet Take 1 tablet by mouth Daily 90 tablet 1    topiramate (TOPAMAX) 50 MG tablet TAKE 2 TABLETS TWICE DAILY 360 tablet 1    nitroGLYCERIN (NITROLINGUAL) 0.4 MG/SPRAY 0.4 mg spray Place 1 spray under the tongue every 5 minutes as needed for Chest pain 4.9 g 1    fluticasone (FLONASE) 50 MCG/ACT nasal spray 2 sprays by Each Nostril route daily 16 g 0    metoprolol succinate (TOPROL XL) 25 MG extended release tablet Take 0.5 tablets by mouth daily . 5 tablet daily 45 tablet 0       Allergies: Allergies   Allergen Reactions    Aspirin Nausea Only    Diltiazem Anaphylaxis    Diltiazem Hcl Anaphylaxis    Lorazepam Other (See Comments)     hallucinations  hallucinations  hallucinations    Sulfa Antibiotics Rash and Hives    Atorvastatin Other (See Comments)     Muscle pains  Muscle pains  Muscle pains    Dabigatran Nausea Only     And indigestion  And indigestion    Aka Pradaxa  And indigestion  And indigestion  And indigestion    Aka Pradaxa  And indigestion  And indigestion  And indigestion    Aka Pradaxa    Mysoline [Primidone]     Nsaids      Other reaction(s): Unknown    Other Other (See Comments)     Nitroglycerin patches causes severe headaches    Primidone      Other reaction(s): Unknown        Social History:  Patient Lives at home   reports that she quit smoking about 35 years ago. Her smoking use included cigarettes. She has a 28.00 pack-year smoking history. She has never used smokeless tobacco. She reports current alcohol use. She reports that she does not use drugs. Family History:  family history includes Cancer in her maternal grandmother, paternal grandmother, and sister; Diabetes in her brother and maternal uncle;  Heart Disease in her brother, maternal aunt, and sister; Hypertension in her brother and paternal aunt; Stroke in her maternal aunt. ,     Physical Exam:  BP (!) 179/75   Pulse 70   Temp 98.8 °F (37.1 °C) (Oral)   Resp 27   Ht 5' 3\" (1.6 m)   Wt 116 lb (52.6 kg)   SpO2 99%   BMI 20.55 kg/m²     General appearance:  Appears comfortable. AAOx3  HEENT: atraumatic, Pupils equal, muscous membranes moist, no masses appreciated  Cardiovascular: Regular rate and rhythm no murmurs appreciated  Respiratory: CTAB no wheezing  Gastrointestinal: Abdomen soft, non-tender, BS+  EXT: no edema  Neurology: no gross focal deficts  Psychiatry: Appropriate affect. Not agitated  Skin: Warm, dry, no rashes appreciated    Labs:  CBC:   Lab Results   Component Value Date/Time    WBC 10.1 09/10/2022 04:34 PM    RBC 4.96 09/10/2022 04:34 PM    HGB 13.1 09/10/2022 04:34 PM    HCT 42.7 09/10/2022 04:34 PM    MCV 86.0 09/10/2022 04:34 PM    MCH 26.5 09/10/2022 04:34 PM    MCHC 30.8 09/10/2022 04:34 PM    RDW 20.1 09/10/2022 04:34 PM     09/10/2022 04:34 PM    MPV 9.2 09/10/2022 04:34 PM     BMP:    Lab Results   Component Value Date/Time     09/10/2022 04:34 PM    K 4.7 09/10/2022 04:34 PM     09/10/2022 04:34 PM    CO2 22 09/10/2022 04:34 PM    BUN 31 09/10/2022 04:34 PM    CREATININE 1.3 09/10/2022 04:34 PM    CALCIUM 9.0 09/10/2022 04:34 PM    GFRAA 47 09/10/2022 04:34 PM    GFRAA 38 04/02/2013 09:47 AM    LABGLOM 39 09/10/2022 04:34 PM    GLUCOSE 99 09/10/2022 04:34 PM    GLUCOSE 80 06/08/2022 02:37 PM     XR CHEST PORTABLE   Final Result   Mild pulmonary vascular congestion. Stable cardial pericardial silhouette   enlargement. Otherwise unremarkable portable chest radiograph. Recent imaging reviewed    Problem List  Principal Problem:    Exertional dyspnea  Resolved Problems:    * No resolved hospital problems.  *        Assessment/Plan:   Exertional dyspnea likely combination of CAD and acute on chronic diastolci chf  - iv lasix today and monitor response  + stress test cath on Monday    PAF: home meds, s/p watchmans on plavix    Ckd3 monitor      DVT prophylaxis lovenox  Code status full code        Admit as inpatient I anticipate hospitalization spanning more than two midnights for investigation and treatment of the above medically necessary diagnoses. Please note that some part of this chart was generated using Dragon dictation software. Although every effort was made to ensure the accuracy of this automated transcription, some errors in transcription may have occurred inadvertently. If you may need any clarification, please do not hesitate to contact me through [facility].        Chon Todd MD    9/10/2022 6:55 PM

## 2022-09-10 NOTE — ED PROVIDER NOTES
[facility]        Pt Name: Peyton Avalos  MRN: [id number]  [place] 1940  Date of evaluation: 9/10/2022  Provider: MAYNOR Judd  PCP: Alexa Veronica MD  Note Started: 5:54 PM EDT        I have seen and evaluated this patient with my supervising physician Alberto Mora MD.    CHIEF COMPLAINT       Chief Complaint   Patient presents with    Shortness of Breath    Atrial Fibrillation     Sent by dr Paolo Edward to have angiogram on monday       HISTORY OF PRESENT ILLNESS   (Location, Timing/Onset, Context/Setting, Quality, Duration, Modifying Factors, Severity, Associated Signs and Symptoms)  Note limiting factors. Chief Complaint: Ryne Avina is a 80 y.o. female with past medical history of CAD, atrial fibrillation, CHF, hyperlipidemia, hypertension who presents to the ED with complaint of shortness of breath. Patient states has had increasing shortness of breath especially with exertion over the past couple of days. Patient states he is followed up with cardiology on the outpatient basis. Had stress test she states in the past month which is abnormal.  She is scheduled to have a cardiac catheterization/angiogram on Monday with Dr. Naoma Mortimer. Patient states she had increasing shortness of breath, exercise intolerance and weakness so came to the ED for further evaluation and treatment. Denies any chest pain or chest tightness. Denies cough, hemoptysis, pleuritic pain, orthopnea, pedal edema or calf tenderness. Denies fever or chills. Denies rashes or lesions. Denies abdominal pain, nausea/vomiting, urinary symptoms or changes in bowel movements. Nursing Notes were all reviewed and agreed with or any disagreements were addressed in the HPI. REVIEW OF SYSTEMS    (2-9 systems for level 4, 10 or more for level 5)     Review of Systems   Constitutional:  Positive for activity change and fatigue. Negative for appetite change, chills, diaphoresis and fever. Respiratory:  Positive for shortness of breath. Negative for cough and chest tightness. Cardiovascular: Negative. Negative for chest pain, palpitations and leg swelling. Gastrointestinal:  Negative for abdominal pain, constipation, diarrhea, nausea and vomiting. Genitourinary:  Negative for decreased urine volume, difficulty urinating, dysuria, flank pain, frequency, hematuria and urgency. Musculoskeletal:  Negative for arthralgias, back pain, myalgias, neck pain and neck stiffness. Skin:  Negative for color change, pallor, rash and wound. Neurological:  Negative for dizziness, weakness, light-headedness and headaches. Positives and Pertinent negatives as per HPI. Except as noted above in the ROS, all other systems were reviewed and negative.        PAST MEDICAL HISTORY     Past Medical History:   Diagnosis Date    Allergic rhinitis, cause unspecified     Arthritis     Atrial fibrillation ([facility] [address]     Bronchopneumonia     CAD (coronary artery disease)     stent:  post cataract surgery (CABG)    Cerebral artery occlusion with cerebral infarction [facility])     TIA    Chronic gouty arthropathy     Chronic kidney disease     Congestive heart failure, unspecified     Degeneration of cervical intervertebral disc     Essential and other specified forms of tremor     Gout     HIGH CHOLESTEROL     History of CVA (cerebrovascular accident)     Hx of blood clots     Hypertension     Influenza 12/23/2017    Leakage of Watchman left atrial appendage closure device     Mitral valve stenosis and aortic valve insufficiency     Movement disorder     back problems    Pacemaker     Peptic ulcer, unspecified site, unspecified as acute or chronic, without mention of hemorrhage, perforation, or obstruction     Thyroid disease     Unspecified disorder of kidney and ureter     Unspecified hypothyroidism          SURGICAL HISTORY     Past Surgical History:   Procedure Laterality Date    BACK SURGERY      CARDIAC CATHETERIZATION  7/2012    CARDIAC CATHETERIZATION  08/07/2018    Unsuccesful  of RCA    CARDIAC SURGERY      CABG & Cardiac ablation    CHOLECYSTECTOMY      CORONARY ARTERY BYPASS GRAFT  1987    LIMA- Diag/LAD, SVG- RCA    FEMORAL BYPASS Left 8/22/2019    LEFT FEMORAL TO POPLITEAL BYPASS GRAFT performed by Caterina Sorensen MD at [address] [facility] GRAFT N/A 8/22/2019    FEMORAL TO FEMORAL BYPASS performed by Caterina Sorensen MD at [address] Left 03/16/2017    Left hip pinning    IR KYPHOPLASTY LUMBAR FIRST LEVEL  5/14/2021    IR KYPHOPLASTY LUMBAR FIRST LEVEL 5/14/2021 [facility] SPECIAL PROCEDURES    JOINT REPLACEMENT      MO NJX AA&/STRD TFRML EPI LUMBAR/SACRAL 1 LEVEL Right 12/3/2018    RIGHT L3 AND L4 LUMBAR TRANSFORAMINAL EPIRUAL STEROID INJECTION WITH FLUOROSCOPY performed by Enma Hamilton MD at [address] [facility]    PTCA  11/04/2014    MARLENY - 3.0 x 28 to the Ist Diag    SHOULDER SURGERY      left         CURRENTMEDICATIONS       Previous Medications    ALBUTEROL SULFATE HFA (VENTOLIN HFA) 108 (90 BASE) MCG/ACT INHALER    Inhale 2 puffs into the lungs 4 times daily as needed for Wheezing    ALLOPURINOL (ZYLOPRIM) 100 MG TABLET    TAKE 1 TABLET EVERY DAY    ASPIRIN EC 81 MG EC TABLET    Take 1 tablet by mouth in the morning. CLOPIDOGREL (PLAVIX) 75 MG TABLET    Take 1 tablet by mouth in the morning. FLUTICASONE (FLONASE) 50 MCG/ACT NASAL SPRAY    2 sprays by Each Nostril route daily    HYDROCODONE-ACETAMINOPHEN (NORCO) 5-325 MG PER TABLET    Take 1 tablet by mouth 4 times daily as needed for Pain for up to 30 days. LEVOTHYROXINE (SYNTHROID) 125 MCG TABLET    Take 1 tablet by mouth Daily    METHYLPREDNISOLONE (MEDROL) 4 MG TABLET    Take 0.5 tablets by mouth daily for 6 days    METOPROLOL SUCCINATE (TOPROL XL) 25 MG EXTENDED RELEASE TABLET    Take 0.5 tablets by mouth daily . 5 tablet daily    NITROGLYCERIN (NITROLINGUAL) 0.4 MG/SPRAY 0.4 MG SPRAY    Place 1 spray under the tongue every 5 minutes as needed for Chest pain    TOPIRAMATE (TOPAMAX) 50 MG TABLET    TAKE 2 TABLETS TWICE DAILY    TORSEMIDE (DEMADEX) 10 MG TABLET    20 mg 2 days of the week and 10 mg the rest    VITAMIN D (ERGOCALCIFEROL) 1.25 MG (00684 UT) CAPS CAPSULE    TAKE 1 CAPSULE ONCE A WEEK         ALLERGIES     Aspirin, Diltiazem, Diltiazem hcl, Lorazepam, Sulfa antibiotics, Atorvastatin, Dabigatran, Mysoline [primidone], Nsaids, Other, and Primidone    FAMILYHISTORY       Family History   Problem Relation Age of Onset    Cancer Sister     Heart Disease Sister     Diabetes Brother     Hypertension Brother     Heart Disease Brother     Stroke Maternal Aunt     Heart Disease Maternal Aunt     Diabetes Maternal Uncle     Hypertension Paternal Aunt     Cancer Maternal Grandmother     Cancer Paternal Grandmother     Rheum Arthritis Neg Hx     Lupus Neg Hx           SOCIAL HISTORY       Social History     Tobacco Use    Smoking status: Former     Packs/day: 1.00     Years: 28.00     Pack years: 28.00     Types: Cigarettes     Quit date: 1987     Years since quittin.7    Smokeless tobacco: Never    Tobacco comments:     H.O.smoking at age 15 / smoked up to 1 p.p.d / quit    Vaping Use    Vaping Use: Never used   Substance Use Topics    Alcohol use: Yes     Comment: social    Drug use: No       SCREENINGS    [place] Coma Scale  Eye Opening: Spontaneous  Best Verbal Response: Oriented  Best Motor Response: Obeys commands  [place] Coma Scale Score: 15        PHYSICAL EXAM    (up to 7 for level 4, 8 or more for level 5)     ED Triage Vitals [09/10/22 1606]   BP Temp Temp Source Heart Rate Resp SpO2 Height Weight   126/70 98.8 °F (37.1 °C) Oral 70 18 100 % 5' 3\" (1.6 m) 116 lb (52.6 kg)       Physical Exam  Constitutional:       General: She is not in acute distress. Appearance: She is well-developed. She is not ill-appearing, toxic-appearing or diaphoretic.    HENT: Head: Normocephalic and atraumatic. Right Ear: External ear normal.      Left Ear: External ear normal.   Eyes:      General:         Right eye: No discharge. Left eye: No discharge. Conjunctiva/sclera: Conjunctivae normal.   Cardiovascular:      Rate and Rhythm: Normal rate and regular rhythm. Pulses: Normal pulses. Heart sounds: Normal heart sounds. No murmur heard. No friction rub. No gallop. Pulmonary:      Effort: Pulmonary effort is normal. No respiratory distress. Breath sounds: Normal breath sounds. No stridor. No wheezing, rhonchi or rales. Chest:      Chest wall: No tenderness. Abdominal:      General: Abdomen is flat. There is no distension. Palpations: Abdomen is soft. There is no mass. Tenderness: There is no abdominal tenderness. There is no right CVA tenderness, left CVA tenderness, guarding or rebound. Hernia: No hernia is present. Musculoskeletal:         General: Normal range of motion. Cervical back: Normal range of motion and neck supple. Skin:     General: Skin is warm and dry. Coloration: Skin is not pale. Findings: No erythema or rash. Neurological:      Mental Status: She is alert and oriented to person, place, and time.    Psychiatric:         Behavior: Behavior normal.       DIAGNOSTIC RESULTS   LABS:    Labs Reviewed   CBC WITH AUTO DIFFERENTIAL - Abnormal; Notable for the following components:       Result Value    MCHC 30.8 (*)     RDW 20.1 (*)     Neutrophils Absolute 8.2 (*)     Lymphocytes Absolute 0.9 (*)     Anisocytosis 1+ (*)     Ovalocytes Occasional (*)     All other components within normal limits   COMPREHENSIVE METABOLIC PANEL W/ REFLEX TO MG FOR LOW K - Abnormal; Notable for the following components:    Chloride 112 (*)     BUN 31 (*)     Creatinine 1.3 (*)     GFR Non- 39 (*)     GFR  47 (*)     ALT 9 (*)     All other components within normal limits   BRAIN NATRIURETIC PEPTIDE - Abnormal; Notable for the following components:    Pro-BNP 7,004 (*)     All other components within normal limits   TROPONIN       When ordered only abnormal lab results are displayed. All other labs were within normal range or not returned as of this dictation. EKG: When ordered, EKG's are interpreted by the Emergency Department Physician in the absence of a cardiologist.  Please see their note for interpretation of EKG. RADIOLOGY:   Non-plain film images such as CT, Ultrasound and MRI are read by the radiologist. Plain radiographic images are visualized and preliminarily interpreted by the ED Provider with the below findings:        Interpretation per the Radiologist below, if available at the time of this note:    XR CHEST PORTABLE   Final Result   Mild pulmonary vascular congestion. Stable cardial pericardial silhouette   enlargement. Otherwise unremarkable portable chest radiograph. XR CHEST PORTABLE    Result Date: 9/10/2022  EXAMINATION: ONE XRAY VIEW OF THE CHEST 9/10/2022 1:27 pm COMPARISON: 09/01/2022 HISTORY: ORDERING SYSTEM PROVIDED HISTORY: sob TECHNOLOGIST PROVIDED HISTORY: Reason for exam:->sob Reason for Exam: Atrial Fibrillation (Sent by dr Liam Langston to have angiogram on monday) FINDINGS: Cardial pericardial silhouette is enlarged, similar when compared to the previous exam.  Pacemaker again noted. The pulmonary vasculature is mildly congested. No acute infiltrate is seen. Chronic scarring and blunting of the left costophrenic angle is unchanged. No pneumothorax is found. No free air. No acute bony abnormality. Mild pulmonary vascular congestion. Stable cardial pericardial silhouette enlargement. Otherwise unremarkable portable chest radiograph. XR HIP 2-3 VW W PELVIS RIGHT    Result Date: 9/6/2022  Radiology exam is complete. No Radiologist dictation. Please follow up with ordering provider.            PROCEDURES   Unless otherwise noted below, none Procedures    CRITICAL CARE TIME       CONSULTS:  None      EMERGENCY DEPARTMENT COURSE and DIFFERENTIAL DIAGNOSIS/MDM:   Vitals:    Vitals:    09/10/22 1606 09/10/22 1715 09/10/22 1730   BP: 126/70 (!) 186/67 (!) 179/75   Pulse: 70 70 70   Resp: 18 22 27   Temp: 98.8 °F (37.1 °C)     TempSrc: Oral     SpO2: 100% 100% 99%   Weight: 116 lb (52.6 kg)     Height: 5' 3\" (1.6 m)         Patient was given the following medications:  Medications   furosemide (LASIX) injection 40 mg (has no administration in time range)   aspirin chewable tablet 324 mg (has no administration in time range)         Is this patient to be included in the SEP-1 Core Measure due to severe sepsis or septic shock? No   Exclusion criteria - the patient is NOT to be included for SEP-1 Core Measure due to: Infection is not suspected    Patient is an 80-year-old female who presents to the ED with complaint of shortness of breath on exertion. Patient afebrile stable vital signs here in the emergency department. Nontoxic appearance. CBC showed normal white count, hemoglobin and platelets. CMP showed creatinine of 1.3. She has had similar kidney function testing in the past.  Troponin was normal.  BNP elevated at 7004. Chest x-ray showed some pulmonary vascular congestion with stable cardial/pericardial silhouette enlargement. Otherwise unremarkable. EKG inter by attending. Patient suffering from some dyspnea upon exertion with elevated BNP. She was given a small dose of Lasix here in the emergency department. Given aspirin. Patient scheduled for cardiac catheterization on Monday. Believe patient benefit from admission for some diuresis and evaluation by cardiology for potential cardiac catheterization given her worsening symptoms. Case discussed with hospital service for admission.     FINAL IMPRESSION      1. SCHMID (dyspnea on exertion)          DISPOSITION/PLAN   DISPOSITION Decision To Admit 09/10/2022 05:40:00 PM      PATIENT REFERRED TO:  No follow-up provider specified.     DISCHARGE MEDICATIONS:  New Prescriptions    No medications on file       DISCONTINUED MEDICATIONS:  Discontinued Medications    No medications on file              (Please note that portions of this note were completed with a voice recognition program.  Efforts were made to edit the dictations but occasionally words are mis-transcribed.)    MAYNOR Metcalf (electronically signed)          MAYNOR Stephens  09/10/22 [address] [place] [address] [id number]

## 2022-09-11 LAB
A/G RATIO: 1.4 (ref 1.1–2.2)
ALBUMIN SERPL-MCNC: 4 G/DL (ref 3.4–5)
ALP BLD-CCNC: 110 U/L (ref 40–129)
ALT SERPL-CCNC: 9 U/L (ref 10–40)
AMMONIA: 18 UMOL/L (ref 11–51)
ANION GAP SERPL CALCULATED.3IONS-SCNC: 13 MMOL/L (ref 3–16)
ANION GAP SERPL CALCULATED.3IONS-SCNC: 15 MMOL/L (ref 3–16)
AST SERPL-CCNC: 19 U/L (ref 15–37)
BACTERIA: ABNORMAL /HPF
BASE EXCESS ARTERIAL: -2.9 MMOL/L (ref -3–3)
BASOPHILS ABSOLUTE: 0.1 K/UL (ref 0–0.2)
BASOPHILS ABSOLUTE: 0.2 K/UL (ref 0–0.2)
BASOPHILS RELATIVE PERCENT: 0.7 %
BASOPHILS RELATIVE PERCENT: 1.6 %
BILIRUB SERPL-MCNC: 0.6 MG/DL (ref 0–1)
BILIRUBIN URINE: NEGATIVE
BLOOD, URINE: ABNORMAL
BUN BLDV-MCNC: 25 MG/DL (ref 7–20)
BUN BLDV-MCNC: 27 MG/DL (ref 7–20)
CALCIUM SERPL-MCNC: 8.8 MG/DL (ref 8.3–10.6)
CALCIUM SERPL-MCNC: 9.4 MG/DL (ref 8.3–10.6)
CARBOXYHEMOGLOBIN ARTERIAL: 0.9 % (ref 0–1.5)
CHLORIDE BLD-SCNC: 107 MMOL/L (ref 99–110)
CHLORIDE BLD-SCNC: 108 MMOL/L (ref 99–110)
CLARITY: CLEAR
CO2: 18 MMOL/L (ref 21–32)
CO2: 19 MMOL/L (ref 21–32)
COLOR: ABNORMAL
CREAT SERPL-MCNC: 1.1 MG/DL (ref 0.6–1.2)
CREAT SERPL-MCNC: 1.2 MG/DL (ref 0.6–1.2)
EKG ATRIAL RATE: 73 BPM
EKG DIAGNOSIS: NORMAL
EKG P AXIS: 60 DEGREES
EKG P-R INTERVAL: 308 MS
EKG Q-T INTERVAL: 422 MS
EKG QRS DURATION: 104 MS
EKG QTC CALCULATION (BAZETT): 464 MS
EKG R AXIS: -14 DEGREES
EKG T AXIS: -20 DEGREES
EKG VENTRICULAR RATE: 73 BPM
EOSINOPHILS ABSOLUTE: 0.1 K/UL (ref 0–0.6)
EOSINOPHILS ABSOLUTE: 0.3 K/UL (ref 0–0.6)
EOSINOPHILS RELATIVE PERCENT: 0.7 %
EOSINOPHILS RELATIVE PERCENT: 2.5 %
EPITHELIAL CELLS, UA: 2 /HPF (ref 0–5)
GFR AFRICAN AMERICAN: 52
GFR AFRICAN AMERICAN: 58
GFR NON-AFRICAN AMERICAN: 43
GFR NON-AFRICAN AMERICAN: 48
GLUCOSE BLD-MCNC: 119 MG/DL (ref 70–99)
GLUCOSE BLD-MCNC: 138 MG/DL (ref 70–99)
GLUCOSE URINE: NEGATIVE MG/DL
HCO3 ARTERIAL: 19 MMOL/L (ref 21–29)
HCT VFR BLD CALC: 45.3 % (ref 36–48)
HCT VFR BLD CALC: 45.8 % (ref 36–48)
HEMOGLOBIN, ART, EXTENDED: 14.5 G/DL (ref 12–16)
HEMOGLOBIN: 13.7 G/DL (ref 12–16)
HEMOGLOBIN: 14.3 G/DL (ref 12–16)
HYALINE CASTS: 0 /LPF (ref 0–8)
KETONES, URINE: NEGATIVE MG/DL
LEUKOCYTE ESTERASE, URINE: ABNORMAL
LYMPHOCYTES ABSOLUTE: 0.8 K/UL (ref 1–5.1)
LYMPHOCYTES ABSOLUTE: 1.1 K/UL (ref 1–5.1)
LYMPHOCYTES RELATIVE PERCENT: 5.4 %
LYMPHOCYTES RELATIVE PERCENT: 8.5 %
MAGNESIUM: 2 MG/DL (ref 1.8–2.4)
MAGNESIUM: 2.1 MG/DL (ref 1.8–2.4)
MCH RBC QN AUTO: 26.4 PG (ref 26–34)
MCH RBC QN AUTO: 26.5 PG (ref 26–34)
MCHC RBC AUTO-ENTMCNC: 30.2 G/DL (ref 31–36)
MCHC RBC AUTO-ENTMCNC: 31.2 G/DL (ref 31–36)
MCV RBC AUTO: 85 FL (ref 80–100)
MCV RBC AUTO: 87.2 FL (ref 80–100)
METHEMOGLOBIN ARTERIAL: 0.2 %
MICROSCOPIC EXAMINATION: YES
MONOCYTES ABSOLUTE: 0.5 K/UL (ref 0–1.3)
MONOCYTES ABSOLUTE: 1 K/UL (ref 0–1.3)
MONOCYTES RELATIVE PERCENT: 3.8 %
MONOCYTES RELATIVE PERCENT: 6.7 %
NEUTROPHILS ABSOLUTE: 11 K/UL (ref 1.7–7.7)
NEUTROPHILS ABSOLUTE: 13.1 K/UL (ref 1.7–7.7)
NEUTROPHILS RELATIVE PERCENT: 83.6 %
NEUTROPHILS RELATIVE PERCENT: 86.5 %
NITRITE, URINE: NEGATIVE
O2 SAT, ARTERIAL: 98.4 %
O2 THERAPY: ABNORMAL
PCO2 ARTERIAL: 25.9 MMHG (ref 35–45)
PDW BLD-RTO: 20 % (ref 12.4–15.4)
PDW BLD-RTO: 20.3 % (ref 12.4–15.4)
PH ARTERIAL: 7.47 (ref 7.35–7.45)
PH UA: 6.5 (ref 5–8)
PLATELET # BLD: 358 K/UL (ref 135–450)
PLATELET # BLD: 374 K/UL (ref 135–450)
PMV BLD AUTO: 9.4 FL (ref 5–10.5)
PMV BLD AUTO: 9.4 FL (ref 5–10.5)
PO2 ARTERIAL: 94.3 MMHG (ref 75–108)
POTASSIUM REFLEX MAGNESIUM: 3.3 MMOL/L (ref 3.5–5.1)
POTASSIUM REFLEX MAGNESIUM: 3.4 MMOL/L (ref 3.5–5.1)
PROCALCITONIN: 0.35 NG/ML (ref 0–0.15)
PROTEIN UA: ABNORMAL MG/DL
RBC # BLD: 5.2 M/UL (ref 4–5.2)
RBC # BLD: 5.39 M/UL (ref 4–5.2)
RBC UA: 715 /HPF (ref 0–4)
SODIUM BLD-SCNC: 138 MMOL/L (ref 136–145)
SODIUM BLD-SCNC: 142 MMOL/L (ref 136–145)
SPECIFIC GRAVITY UA: 1.01 (ref 1–1.03)
TCO2 ARTERIAL: 44.4 MMOL/L
TOTAL PROTEIN: 6.9 G/DL (ref 6.4–8.2)
TSH SERPL DL<=0.05 MIU/L-ACNC: 0.4 UIU/ML (ref 0.27–4.2)
URINE REFLEX TO CULTURE: YES
URINE TYPE: ABNORMAL
UROBILINOGEN, URINE: 0.2 E.U./DL
WBC # BLD: 13.2 K/UL (ref 4–11)
WBC # BLD: 15.2 K/UL (ref 4–11)
WBC UA: 20 /HPF (ref 0–5)

## 2022-09-11 PROCEDURE — 84145 PROCALCITONIN (PCT): CPT

## 2022-09-11 PROCEDURE — 6370000000 HC RX 637 (ALT 250 FOR IP): Performed by: INTERNAL MEDICINE

## 2022-09-11 PROCEDURE — 87040 BLOOD CULTURE FOR BACTERIA: CPT

## 2022-09-11 PROCEDURE — 94640 AIRWAY INHALATION TREATMENT: CPT

## 2022-09-11 PROCEDURE — 6360000002 HC RX W HCPCS: Performed by: INTERNAL MEDICINE

## 2022-09-11 PROCEDURE — 87086 URINE CULTURE/COLONY COUNT: CPT

## 2022-09-11 PROCEDURE — 37799 UNLISTED PX VASCULAR SURGERY: CPT

## 2022-09-11 PROCEDURE — 99223 1ST HOSP IP/OBS HIGH 75: CPT | Performed by: INTERNAL MEDICINE

## 2022-09-11 PROCEDURE — 2060000000 HC ICU INTERMEDIATE R&B

## 2022-09-11 PROCEDURE — 6360000002 HC RX W HCPCS: Performed by: PHYSICIAN ASSISTANT

## 2022-09-11 PROCEDURE — 81001 URINALYSIS AUTO W/SCOPE: CPT

## 2022-09-11 PROCEDURE — 85025 COMPLETE CBC W/AUTO DIFF WBC: CPT

## 2022-09-11 PROCEDURE — 80053 COMPREHEN METABOLIC PANEL: CPT

## 2022-09-11 PROCEDURE — 83735 ASSAY OF MAGNESIUM: CPT

## 2022-09-11 PROCEDURE — 93010 ELECTROCARDIOGRAM REPORT: CPT | Performed by: INTERNAL MEDICINE

## 2022-09-11 PROCEDURE — 36415 COLL VENOUS BLD VENIPUNCTURE: CPT

## 2022-09-11 PROCEDURE — 82803 BLOOD GASES ANY COMBINATION: CPT

## 2022-09-11 PROCEDURE — 82140 ASSAY OF AMMONIA: CPT

## 2022-09-11 PROCEDURE — 2580000003 HC RX 258: Performed by: INTERNAL MEDICINE

## 2022-09-11 PROCEDURE — 84443 ASSAY THYROID STIM HORMONE: CPT

## 2022-09-11 RX ORDER — FUROSEMIDE 10 MG/ML
40 INJECTION INTRAMUSCULAR; INTRAVENOUS ONCE
Status: COMPLETED | OUTPATIENT
Start: 2022-09-11 | End: 2022-09-11

## 2022-09-11 RX ORDER — POTASSIUM CHLORIDE 7.45 MG/ML
10 INJECTION INTRAVENOUS PRN
Status: DISCONTINUED | OUTPATIENT
Start: 2022-09-11 | End: 2022-09-16 | Stop reason: HOSPADM

## 2022-09-11 RX ORDER — HYDRALAZINE HYDROCHLORIDE 20 MG/ML
10 INJECTION INTRAMUSCULAR; INTRAVENOUS ONCE
Status: COMPLETED | OUTPATIENT
Start: 2022-09-11 | End: 2022-09-11

## 2022-09-11 RX ORDER — POTASSIUM CHLORIDE 20 MEQ/1
40 TABLET, EXTENDED RELEASE ORAL PRN
Status: DISCONTINUED | OUTPATIENT
Start: 2022-09-11 | End: 2022-09-14

## 2022-09-11 RX ORDER — ZIPRASIDONE MESYLATE 20 MG/ML
20 INJECTION, POWDER, LYOPHILIZED, FOR SOLUTION INTRAMUSCULAR ONCE
Status: DISCONTINUED | OUTPATIENT
Start: 2022-09-11 | End: 2022-09-16 | Stop reason: HOSPADM

## 2022-09-11 RX ORDER — DIAZEPAM 5 MG/ML
5 INJECTION, SOLUTION INTRAMUSCULAR; INTRAVENOUS ONCE
Status: COMPLETED | OUTPATIENT
Start: 2022-09-11 | End: 2022-09-11

## 2022-09-11 RX ORDER — FUROSEMIDE 10 MG/ML
60 INJECTION INTRAMUSCULAR; INTRAVENOUS ONCE
Status: COMPLETED | OUTPATIENT
Start: 2022-09-11 | End: 2022-09-11

## 2022-09-11 RX ORDER — HYDRALAZINE HYDROCHLORIDE 20 MG/ML
10 INJECTION INTRAMUSCULAR; INTRAVENOUS EVERY 6 HOURS PRN
Status: DISCONTINUED | OUTPATIENT
Start: 2022-09-11 | End: 2022-09-16 | Stop reason: HOSPADM

## 2022-09-11 RX ADMIN — FUROSEMIDE 40 MG: 10 INJECTION, SOLUTION INTRAMUSCULAR; INTRAVENOUS at 12:10

## 2022-09-11 RX ADMIN — HYDRALAZINE HYDROCHLORIDE 10 MG: 20 INJECTION INTRAMUSCULAR; INTRAVENOUS at 01:07

## 2022-09-11 RX ADMIN — Medication 2 PUFF: at 01:41

## 2022-09-11 RX ADMIN — SODIUM CHLORIDE, PRESERVATIVE FREE 10 ML: 5 INJECTION INTRAVENOUS at 04:24

## 2022-09-11 RX ADMIN — ENOXAPARIN SODIUM 30 MG: 100 INJECTION SUBCUTANEOUS at 09:14

## 2022-09-11 RX ADMIN — HYDROCODONE BITARTRATE AND ACETAMINOPHEN 1 TABLET: 5; 325 TABLET ORAL at 03:23

## 2022-09-11 RX ADMIN — LEVOTHYROXINE SODIUM 125 MCG: 0.12 TABLET ORAL at 09:19

## 2022-09-11 RX ADMIN — Medication 10 ML: at 09:20

## 2022-09-11 RX ADMIN — ASPIRIN 81 MG: 81 TABLET, COATED ORAL at 09:15

## 2022-09-11 RX ADMIN — SODIUM CHLORIDE, PRESERVATIVE FREE 10 ML: 5 INJECTION INTRAVENOUS at 18:05

## 2022-09-11 RX ADMIN — METOPROLOL SUCCINATE 12.5 MG: 25 TABLET, EXTENDED RELEASE ORAL at 09:14

## 2022-09-11 RX ADMIN — CLOPIDOGREL BISULFATE 75 MG: 75 TABLET ORAL at 09:14

## 2022-09-11 RX ADMIN — FUROSEMIDE 60 MG: 10 INJECTION, SOLUTION INTRAMUSCULAR; INTRAVENOUS at 04:24

## 2022-09-11 RX ADMIN — POTASSIUM BICARBONATE 40 MEQ: 782 TABLET, EFFERVESCENT ORAL at 18:05

## 2022-09-11 RX ADMIN — Medication 1000 MG: at 18:05

## 2022-09-11 RX ADMIN — Medication 10 ML: at 20:28

## 2022-09-11 RX ADMIN — DIAZEPAM 5 MG: 5 INJECTION, SOLUTION INTRAMUSCULAR; INTRAVENOUS at 01:38

## 2022-09-11 ASSESSMENT — PAIN DESCRIPTION - ORIENTATION
ORIENTATION: LEFT
ORIENTATION: RIGHT;LEFT

## 2022-09-11 ASSESSMENT — PAIN SCALES - GENERAL
PAINLEVEL_OUTOF10: 7
PAINLEVEL_OUTOF10: 10

## 2022-09-11 ASSESSMENT — PAIN DESCRIPTION - LOCATION
LOCATION: LEG;GENERALIZED
LOCATION: LEG

## 2022-09-11 ASSESSMENT — PAIN DESCRIPTION - DESCRIPTORS: DESCRIPTORS: CRAMPING

## 2022-09-11 NOTE — PROGRESS NOTES
Contacted tonight by patient's nurse for patient complaint of SOB. Patient hyperventilating with RR 35. Other VS stable. Patient newly confused. Valium 5 mg IV ordered. Patient briefly calmed allowing labs to be obtained. ABG, CMP, Mg, CBC, Ammonia, TSH ordered. ABG consistent with respiratory alkalosis. However, patient became increasingly agitated about an hour after valium administered. Patient still with a RR of 35. Other VS remain stable. Geodon 20 mg IM x 1 ordered. Labs still pending. Handoff report given to attending physician.       Lv Hadley PA-C

## 2022-09-11 NOTE — PROGRESS NOTES
Admission complete. Pt alert and oriented x4. Telemetry on. BP elevated but all other vital signs stable. Pt SOB on exertion. Discussed plan of care with patient. Pt agreeable. Educated pt on how to use call light, call light within reach, and bed alarm on.

## 2022-09-11 NOTE — PROGRESS NOTES
09/10/22 2027   RT Protocol   History Pulmonary Disease 1   Respiratory pattern 2   Breath sounds 2   Cough 0   Indications for Bronchodilator Therapy On home bronchodilators   Bronchodilator Assessment Score 5

## 2022-09-11 NOTE — PLAN OF CARE
Problem: Pain  Goal: Verbalizes/displays adequate comfort level or baseline comfort level  Outcome: Progressing     Problem: ABCDS Injury Assessment  Goal: Absence of physical injury  Outcome: Progressing     Problem: Skin/Tissue Integrity  Goal: Absence of new skin breakdown  Description: 1. Monitor for areas of redness and/or skin breakdown  2. Assess vascular access sites hourly  3. Every 4-6 hours minimum:  Change oxygen saturation probe site  4. Every 4-6 hours:  If on nasal continuous positive airway pressure, respiratory therapy assess nares and determine need for appliance change or resting period.   Outcome: Progressing

## 2022-09-11 NOTE — ED NOTES
Report given to [facility] on 3T, Pt to be transported to [address] [place] [address] in stable condition, all questions answered during handoff.      Kayode Hernandez RN  09/10/22 2004

## 2022-09-11 NOTE — PROGRESS NOTES
HOSPITALISTS PROGRESS NOTE    9/11/2022 10:26 AM        Name: Amanda Camarillo . Admitted: 9/10/2022  Primary Care Provider: Bhakti Welsh MD (Tel: [phone])      Brief Course: This 49-year-old female with PMHx of A. fib, watchman procedure, CAD on Plavix presented with worsening exertional dyspnea from past 1 week and yesterday started to have shortness of breath upon lying down flat. Of note, patient underwent stress test around end of August which was positive and patient was supposed to have cath on Monday. Interval history:   Pt seen and examined today   Overnight events noted and interval ancillary notes reviewed. Remains in A. fib with heart rate around 75  On room air satting well, afebrile at night, WBCs trended up to 15 point 2K  denied any fevers, chills, chest pain, palpitations, cough, SOB, dizziness. Assessment & Plan:     Exertional dyspnea likely 2/2 combination of CAD and acute on chronic diastolic CHF  CXR showed mild pulmonary vascular congestion elevated proBNP on admission: Continue Lasix  Last echo on 7/20/22 showed EF of 55 to 60%, moderate mitral & tricuspid regurg and mild pulmonary hypertension  Cardiology consulted: Stress test/cath on Monday  Monitor on telemetry. Supplemental oxygen if needed to keep sats     Hx of paroxysmal A. fib: s/p watchmans. Continue metoprolol    UTI: Urine analysis suggestive of UTI; started on ceftriaxone awaiting cultures and sensitivity    Hematuria: Urology consulted.  Monitor CBC closely    Hypothyroidism: Continue Synthroid    CKD stage II: Creatinine 1 2 on admission  Avoid nephrotoxin, strict intake output, daily weights and monitor renal function closely        DVT prophylaxis: lovenox  Code:Full Code    Disposition: Once acute medical issues have resolved    Current Medications  ziprasidone (GEODON) injection 20 mg, Once  sterile water injection, albuterol sulfate HFA (PROVENTIL;VENTOLIN;PROAIR) 108 (90 Base) MCG/ACT inhaler 2 puff, 4x Daily PRN  aspirin EC tablet 81 mg, Daily  clopidogrel (PLAVIX) tablet 75 mg, Daily  fluticasone (FLONASE) 50 MCG/ACT nasal spray 2 spray, Daily  levothyroxine (SYNTHROID) tablet 125 mcg, Daily  HYDROcodone-acetaminophen (NORCO) 5-325 MG per tablet 1 tablet, 4x Daily PRN  metoprolol succinate (TOPROL XL) extended release tablet 12.5 mg, Daily  sodium chloride flush 0.9 % injection 5-40 mL, 2 times per day  sodium chloride flush 0.9 % injection 5-40 mL, PRN  0.9 % sodium chloride infusion, PRN  enoxaparin Sodium (LOVENOX) injection 30 mg, Daily  ondansetron (ZOFRAN-ODT) disintegrating tablet 4 mg, Q8H PRN  polyethylene glycol (GLYCOLAX) packet 17 g, Daily PRN  acetaminophen (TYLENOL) tablet 650 mg, Q6H PRN   Or  acetaminophen (TYLENOL) suppository 650 mg, Q6H PRN        Objective:  BP (!) 161/53   Pulse 75   Temp 98.6 °F (37 °C) (Oral)   Resp 24   Ht 5' 3\" (1.6 m)   Wt 116 lb 11.2 oz (52.9 kg)   SpO2 96%   BMI 20.67 kg/m²     Intake/Output Summary (Last 24 hours) at 9/11/2022 1026  Last data filed at 9/11/2022 0455  Gross per 24 hour   Intake 10 ml   Output 600 ml   Net -590 ml      Wt Readings from Last 3 Encounters:   09/11/22 116 lb 11.2 oz (52.9 kg)   09/06/22 120 lb (54.4 kg)   09/01/22 120 lb (54.4 kg)       Physical Examination:   General appearance:  No apparent distress, appears stated age and cooperative. HEENT: Normocephalic, sclera clear., PERRLA. Trachea midline, no adenopathy. Cardiovascular: Regular rate and rhythm, normal S1, S2. No murmur. Respiratory:Clear to auscultation bilaterally, no wheeze or crackles. GI: Abdomen soft, no tenderness, not distended, normal bowel sounds  Musculoskeletal: No cyanosis in digits. No BLE edema present  Neurology: CN 2-12 grossly intact.  No speech or motor deficits  Psych: Not agitated, appropriate affect  Skin: Warm, dry, normal turgor    Labs and Tests:  CBC: Recent Labs     09/10/22  1634 09/11/22  0212 09/11/22  0718   WBC 10.1 13.2* 15.2*   HGB 13.1 14.3 13.7    374 358     BMP:    Recent Labs     09/10/22  1634 09/11/22  0212 09/11/22  0718    142 138   K 4.7 3.3* 3.4*   * 108 107   CO2 22 19* 18*   BUN 31* 27* 25*   CREATININE 1.3* 1.2 1.1   GLUCOSE 99 119* 138*     Hepatic:   Recent Labs     09/10/22  1634 09/11/22  0212   AST 31 19   ALT 9* 9*   BILITOT 0.3 0.6   ALKPHOS 91 110     XR CHEST PORTABLE   Final Result   Mild pulmonary vascular congestion. Stable cardial pericardial silhouette   enlargement. Otherwise unremarkable portable chest radiograph. Problem List  Principal Problem:    Exertional dyspnea  Resolved Problems:    * No resolved hospital problems.  Savi Lopez MD   9/11/2022 10:26 AM

## 2022-09-11 NOTE — PROGRESS NOTES
Pt called out saying she felt like she was suffocating and couldn't breath. Respirations 35 a minute, breathing labored, and using accessory muscles. SpO2 greater than 92% on room air. Pt very anxious on assessment and confused. Mary Jane MCGUIRE notified. Valium given for anxiety. ABG and labs ordered. Pt less anxious. Continuous SpO2 monitor applied. Pt repositioned and says she is feeling better.

## 2022-09-11 NOTE — RT PROTOCOL NOTE
RT Inhaler-Nebulizer Bronchodilator Protocol Note    There is a bronchodilator order in the chart from a provider indicating to follow the RT Bronchodilator Protocol and there is an Initiate RT Inhaler-Nebulizer Bronchodilator Protocol order as well (see protocol at bottom of note). CXR Findings:  XR CHEST PORTABLE    Result Date: 9/10/2022  Mild pulmonary vascular congestion. Stable cardial pericardial silhouette enlargement. Otherwise unremarkable portable chest radiograph. The findings from the last RT Protocol Assessment were as follows:   History Pulmonary Disease: Smoker 15 pack years or more  Respiratory Pattern: Dyspnea on exertion or RR 21-25 bpm  Breath Sounds: Slightly diminished and/or crackles  Cough: Strong, spontaneous, non-productive  Indication for Bronchodilator Therapy: On home bronchodilators  Bronchodilator Assessment Score: 5    Aerosolized bronchodilator medication orders have been revised according to the RT Inhaler-Nebulizer Bronchodilator Protocol below. Respiratory Therapist to perform RT Therapy Protocol Assessment initially then follow the protocol. Repeat RT Therapy Protocol Assessment PRN for score 0-3 or on second treatment, BID, and PRN for scores above 3. No Indications - adjust the frequency to every 6 hours PRN wheezing or bronchospasm, if no treatments needed after 48 hours then discontinue using Per Protocol order mode. If indication present, adjust the RT bronchodilator orders based on the Bronchodilator Assessment Score as indicated below. Use Inhaler orders unless patient has one or more of the following: on home nebulizer, not able to hold breath for 10 seconds, is not alert and oriented, cannot activate and use MDI correctly, or respiratory rate 25 breaths per minute or more, then use the equivalent nebulizer order(s) with same Frequency and PRN reasons based on the score.   If a patient is on this medication at home then do not decrease Frequency below that used at home. 0-3 - enter or revise RT bronchodilator order(s) to equivalent RT Bronchodilator order with Frequency of every 4 hours PRN for wheezing or increased work of breathing using Per Protocol order mode. 4-6 - enter or revise RT Bronchodilator order(s) to two equivalent RT bronchodilator orders with one order with BID Frequency and one order with Frequency of every 4 hours PRN wheezing or increased work of breathing using Per Protocol order mode. 7-10 - enter or revise RT Bronchodilator order(s) to two equivalent RT bronchodilator orders with one order with TID Frequency and one order with Frequency of every 4 hours PRN wheezing or increased work of breathing using Per Protocol order mode. 11-13 - enter or revise RT Bronchodilator order(s) to one equivalent RT bronchodilator order with QID Frequency and an Albuterol order with Frequency of every 4 hours PRN wheezing or increased work of breathing using Per Protocol order mode. Greater than 13 - enter or revise RT Bronchodilator order(s) to one equivalent RT bronchodilator order with every 4 hours Frequency and an Albuterol order with Frequency of every 2 hours PRN wheezing or increased work of breathing using Per Protocol order mode. RT to enter RT Home Evaluation for COPD & MDI Assessment order using Per Protocol order mode.     Electronically signed by Cindy Arredondo RCP on 9/10/2022 at 8:28 PM

## 2022-09-12 ENCOUNTER — HOSPITAL ENCOUNTER (OUTPATIENT)
Dept: CARDIAC CATH/INVASIVE PROCEDURES | Age: 82
Discharge: HOME | End: 2022-09-12

## 2022-09-12 ENCOUNTER — APPOINTMENT (OUTPATIENT)
Dept: CT IMAGING | Age: 82
DRG: 286 | End: 2022-09-12
Payer: MEDICARE

## 2022-09-12 LAB
ANION GAP SERPL CALCULATED.3IONS-SCNC: 10 MMOL/L (ref 3–16)
ANISOCYTOSIS: ABNORMAL
BASOPHILS ABSOLUTE: 0.1 K/UL (ref 0–0.2)
BASOPHILS RELATIVE PERCENT: 1.1 %
BUN BLDV-MCNC: 28 MG/DL (ref 7–20)
CALCIUM SERPL-MCNC: 9.1 MG/DL (ref 8.3–10.6)
CHLORIDE BLD-SCNC: 109 MMOL/L (ref 99–110)
CO2: 23 MMOL/L (ref 21–32)
CREAT SERPL-MCNC: 1.3 MG/DL (ref 0.6–1.2)
EOSINOPHILS ABSOLUTE: 0.3 K/UL (ref 0–0.6)
EOSINOPHILS RELATIVE PERCENT: 3.3 %
GFR AFRICAN AMERICAN: 47
GFR NON-AFRICAN AMERICAN: 39
GLUCOSE BLD-MCNC: 94 MG/DL (ref 70–99)
HCT VFR BLD CALC: 43.4 % (ref 36–48)
HEMOGLOBIN: 13.6 G/DL (ref 12–16)
LYMPHOCYTES ABSOLUTE: 0.9 K/UL (ref 1–5.1)
LYMPHOCYTES RELATIVE PERCENT: 10.4 %
MCH RBC QN AUTO: 26.8 PG (ref 26–34)
MCHC RBC AUTO-ENTMCNC: 31.4 G/DL (ref 31–36)
MCV RBC AUTO: 85.4 FL (ref 80–100)
MONOCYTES ABSOLUTE: 0.5 K/UL (ref 0–1.3)
MONOCYTES RELATIVE PERCENT: 5.5 %
NEUTROPHILS ABSOLUTE: 7.1 K/UL (ref 1.7–7.7)
NEUTROPHILS RELATIVE PERCENT: 79.7 %
PDW BLD-RTO: 19.7 % (ref 12.4–15.4)
PLATELET # BLD: 334 K/UL (ref 135–450)
PLATELET SLIDE REVIEW: ADEQUATE
PMV BLD AUTO: 9.5 FL (ref 5–10.5)
POTASSIUM REFLEX MAGNESIUM: 3.7 MMOL/L (ref 3.5–5.1)
PRO-BNP: ABNORMAL PG/ML (ref 0–449)
RBC # BLD: 5.08 M/UL (ref 4–5.2)
SLIDE REVIEW: ABNORMAL
SODIUM BLD-SCNC: 142 MMOL/L (ref 136–145)
URINE CULTURE, ROUTINE: NORMAL
WBC # BLD: 8.9 K/UL (ref 4–11)

## 2022-09-12 PROCEDURE — 6360000002 HC RX W HCPCS

## 2022-09-12 PROCEDURE — C1769 GUIDE WIRE: HCPCS

## 2022-09-12 PROCEDURE — 2580000003 HC RX 258

## 2022-09-12 PROCEDURE — 99153 MOD SED SAME PHYS/QHP EA: CPT

## 2022-09-12 PROCEDURE — 85025 COMPLETE CBC W/AUTO DIFF WBC: CPT

## 2022-09-12 PROCEDURE — 6360000004 HC RX CONTRAST MEDICATION: Performed by: INTERNAL MEDICINE

## 2022-09-12 PROCEDURE — 99152 MOD SED SAME PHYS/QHP 5/>YRS: CPT | Performed by: INTERNAL MEDICINE

## 2022-09-12 PROCEDURE — 2709999900 HC NON-CHARGEABLE SUPPLY

## 2022-09-12 PROCEDURE — 2580000003 HC RX 258: Performed by: INTERNAL MEDICINE

## 2022-09-12 PROCEDURE — 93457 R HRT ART/GRFT ANGIO: CPT

## 2022-09-12 PROCEDURE — 93457 R HRT ART/GRFT ANGIO: CPT | Performed by: INTERNAL MEDICINE

## 2022-09-12 PROCEDURE — 2500000003 HC RX 250 WO HCPCS

## 2022-09-12 PROCEDURE — 99152 MOD SED SAME PHYS/QHP 5/>YRS: CPT

## 2022-09-12 PROCEDURE — 6360000002 HC RX W HCPCS: Performed by: INTERNAL MEDICINE

## 2022-09-12 PROCEDURE — 6370000000 HC RX 637 (ALT 250 FOR IP): Performed by: INTERNAL MEDICINE

## 2022-09-12 PROCEDURE — 4A023N6 MEASUREMENT OF CARDIAC SAMPLING AND PRESSURE, RIGHT HEART, PERCUTANEOUS APPROACH: ICD-10-PCS | Performed by: INTERNAL MEDICINE

## 2022-09-12 PROCEDURE — 83880 ASSAY OF NATRIURETIC PEPTIDE: CPT

## 2022-09-12 PROCEDURE — B2111ZZ FLUOROSCOPY OF MULTIPLE CORONARY ARTERIES USING LOW OSMOLAR CONTRAST: ICD-10-PCS | Performed by: INTERNAL MEDICINE

## 2022-09-12 PROCEDURE — 80048 BASIC METABOLIC PNL TOTAL CA: CPT

## 2022-09-12 PROCEDURE — C1751 CATH, INF, PER/CENT/MIDLINE: HCPCS

## 2022-09-12 PROCEDURE — 74176 CT ABD & PELVIS W/O CONTRAST: CPT

## 2022-09-12 PROCEDURE — 2060000000 HC ICU INTERMEDIATE R&B

## 2022-09-12 PROCEDURE — 51798 US URINE CAPACITY MEASURE: CPT

## 2022-09-12 PROCEDURE — 36415 COLL VENOUS BLD VENIPUNCTURE: CPT

## 2022-09-12 RX ORDER — ASCORBIC ACID 500 MG
500 TABLET ORAL 3 TIMES DAILY
Status: DISPENSED | OUTPATIENT
Start: 2022-09-12 | End: 2022-09-15

## 2022-09-12 RX ORDER — ACETYLCYSTEINE 200 MG/ML
600 SOLUTION ORAL; RESPIRATORY (INHALATION) 2 TIMES DAILY
Status: DISPENSED | OUTPATIENT
Start: 2022-09-12 | End: 2022-09-15

## 2022-09-12 RX ADMIN — HYDROCODONE BITARTRATE AND ACETAMINOPHEN 1 TABLET: 5; 325 TABLET ORAL at 21:12

## 2022-09-12 RX ADMIN — OXYCODONE HYDROCHLORIDE AND ACETAMINOPHEN 500 MG: 500 TABLET ORAL at 20:20

## 2022-09-12 RX ADMIN — IOPAMIDOL 61 ML: 755 INJECTION, SOLUTION INTRAVENOUS at 13:15

## 2022-09-12 RX ADMIN — ASPIRIN 81 MG: 81 TABLET, COATED ORAL at 09:50

## 2022-09-12 RX ADMIN — HYDRALAZINE HYDROCHLORIDE 10 MG: 20 INJECTION INTRAMUSCULAR; INTRAVENOUS at 17:17

## 2022-09-12 RX ADMIN — Medication 10 ML: at 09:51

## 2022-09-12 RX ADMIN — METOPROLOL SUCCINATE 12.5 MG: 25 TABLET, EXTENDED RELEASE ORAL at 09:51

## 2022-09-12 RX ADMIN — Medication 10 ML: at 20:20

## 2022-09-12 RX ADMIN — CLOPIDOGREL BISULFATE 75 MG: 75 TABLET ORAL at 09:51

## 2022-09-12 RX ADMIN — OXYCODONE HYDROCHLORIDE AND ACETAMINOPHEN 500 MG: 500 TABLET ORAL at 17:20

## 2022-09-12 RX ADMIN — Medication 1000 MG: at 17:13

## 2022-09-12 RX ADMIN — ACETYLCYSTEINE 600 MG: 200 SOLUTION ORAL; RESPIRATORY (INHALATION) at 17:14

## 2022-09-12 RX ADMIN — LEVOTHYROXINE SODIUM 125 MCG: 0.12 TABLET ORAL at 05:29

## 2022-09-12 RX ADMIN — HYDROCODONE BITARTRATE AND ACETAMINOPHEN 1 TABLET: 5; 325 TABLET ORAL at 07:36

## 2022-09-12 ASSESSMENT — PAIN SCALES - GENERAL: PAINLEVEL_OUTOF10: 7

## 2022-09-12 ASSESSMENT — PAIN DESCRIPTION - ORIENTATION: ORIENTATION: LEFT

## 2022-09-12 ASSESSMENT — PAIN DESCRIPTION - DESCRIPTORS: DESCRIPTORS: DISCOMFORT

## 2022-09-12 ASSESSMENT — PAIN DESCRIPTION - LOCATION: LOCATION: WRIST

## 2022-09-12 NOTE — PROGRESS NOTES
The [place] Sleepiness Scale       The [place] Sleepiness Scale is widely used in the field of sleep medicine as a subjective measure of a patient's sleepiness. The test is a list of eight situations in which you rate your tendency to become sleepy on a scale of 0, no chance to 3, high chance of dozing. Your score is based on a scale of 0 to 24. The scale estimates whether you are experiencing excessive sleepiness that possibly requires medical attention. How Sleepy Are You? How sleepy are you to doze off or fall asleep in the following situations? You should rate your chances of dozing off, not just feeling tired. Even if you have not done some of these things recently try to determine how they would have affected you.  For each situation, decide whether or not you would have:     0 = No chance of dozing 1 = Slight chance of dozing   2 = Moderate chance of  dozing 3 = High change of dozing       Situation                                                                                     Chance of Dozing    Sitting and reading  0 =  []  1 =    [] 2 =    [] 3 =    [x]    Watching TV  0 =  []  1 =    [] 2 =    [] 3 =    [x]      Sitting inactive in public place (e.g., a theater or a meeting)  0 =  [x]  1 =    [] 2 =    [] 3 =    []    As a passenger in a car for an hour without a break          0  =  []  1 =    [] 2 =    [] 3 =    [x]    Lying down to rest in the afternoon when circumstances permit    0 =  []  1 =    [] 2 =    [] 3 =    [x]    Sitting and talking to someone  0 =  []  1 =    [x] 2 =    [] 3 =    []      Sitting quietly after a lunch without alcohol  0 =  []  1 =    [] 2 =    [x] 3 =    []    In a car, while stopped for a few minutes in traffic                                                                      0 =  [x]  1 =    [] 2 =    [] 3 =    []    Total Score = 15    If your total score is 10 or greater, you are experiencing excessive sleepiness and should consider seeking a medical follow-up. Take a copy of this screening test to your primary care physician on your next office visit. Interpretation:      0 -   7: It is unlikely that you are abnormally sleepy. 8 -   9: You have an average amount of daytime sleepiness. 10 - 15: You may be excessively sleepy depending on the situation. You may want to consider seeking medical attention. 16 - 24: You are excessively sleepy and should consider seeking medical attention.       Electronically signed by Jeanie Stahl on 9/12/2022 at 5:46 PM

## 2022-09-12 NOTE — CONSULTS
[facility]                     [facility], [address]                                  CONSULTATION    PATIENT NAME: Deb Salinas                   :        1940  MED REC NO:   [id number]                          ROOM:       6212  ACCOUNT NO:   [de-identified]                           ADMIT DATE: 09/10/2022  PROVIDER:     Garfield Pandey MD    CONSULT DATE:  2022    REASON FOR CONSULTATION:  Request to see patient who was admitted with  worsening shortness of breath. HISTORY OF PRESENT ILLNESS:  This is a pleasant 80-year-old female who  apparently also got confused in the hospital.  She has chronic atrial  fibrillation. However, she is now status post Watchman device. She has  had TIA. He has chronic renal insufficiency with current creatinine  being 1.1. She has a systolic congestive heart failure. Ejection  fraction seemed improved to 45%. She has hypertension, hyperlipidemia,  hypothyroidism. She has longstanding coronary artery disease with  bypass graft surgery in the past.  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 She has had significant  peripheral vascular disease with left to right fem-fem bypass and left  found to have above-knee popliteal bypass. She is status post pacemaker  2021. She is experiencing worsening shortness of breath. She  recently had nuclear stress testing, which showed anterior ischemia and  now it has been scheduled for outpatient cardiac catheterization,  scheduled for tomorrow. However, she grew progressively short of breath  and the daughter felt it was best to bring her to the emergency room. In the emergency room, proBNP is significantly above her baseline at  7004. Troponins normal.  Creatinine 1.1.     MEDICATIONS:  Prior to admission are Medrol daily, torsemide 10 mg  daily, albuterol, vitamin D, aspirin, Plavix, allopurinol, Synthroid,  Topamax, Toprol XL 12.5 mg daily. Currently in the hospital, she appears comfortable at rest and has not  been given Lasix, again during the night she became somewhat agitated,  but that seems to resolve currently. PAST MEDICAL HISTORY:  Longstanding with numerous medical problems,  chronic atrial fibrillation, recurrent pneumonia, history of CVA,  chronic renal insufficiency again stable. status post Watchman device. She has mitral valve disease, aortic valve insufficiency status post  pacemaker. PAST SURGICAL HISTORY:  Back surgery, cholecystectomy, cardiac surgery  in the very distant past, multiple cardiac catheterizations with most  recent in 03/2021. At that time, 50% ostial LAD, 50% mid stent LAD,  adjacent circumflex 100% proximal RCA with numerous collaterals. _____,  but she is known to have been a LIMA to left anterior descending patent  and the saphenous vein graft to PDA occluded. Most recent esophageal echo was in 07/2022. She has had only  transesophageal echo associated with her Watchman device. Last  echocardiogram was approximately one and half years ago. Last full  transthoracic echocardiogram, ejection fraction 45%, did not show  significant valvular heart disease. ALLERGIES:  ASPIRIN leads to nausea, Ativan leads to hallucinations,  atorvastatin leads to muscle aches. SOCIAL HISTORY:  Lives at home, quit smoking in the very distant past.   She drinks alcohol only on occasion. FAMILY HISTORY:  Notable for numerous family members with coronary  artery disease with premature coronaries in the family. PHYSICAL EXAMINATION:  VITAL SIGNS:  Blood pressure 160/70, pulse 70, afebrile. HEENT:  Ears comfortable. Appears alert. Sclerae are clear. Posterior  pharynx clear. Neck is supple. There are no carotid bruits. No  jugular venous tension. LUNGS:  Have few crackles. CARDIAC:  Exam is notable for irregular rhythm. No murmurs heard. ABDOMEN:  Soft, nontender.   EXTREMITIES:  Without edema.  She moves all extremities well. SKIN:  Warm and dry. LABORATORY DATA:  ProBNP is mentioned as 7000, creatinine 1.3. Chest x-ray with vascular congestion. IMPRESSION:  Acute congestive heart failure improved with one dose of  Lasix. Feel she will be stable for cardiac catheterization at this  time, paroxysmal atrial fibrillation, recent nuclear stress test with  anterior ischemia with known stenting of left anterior descending. PLAN:  We will observe the patient in the hospital.  Cardiac  catheterization currently scheduled for tomorrow. Please note 70 minutes time was spent with majority of the time with  direct patient contact performing this consultation.         Rafael Cervantes MD    D: 09/11/2022 15:52:55       T: 09/11/2022 15:58:03     [email or web address]  Job#: [id number]     Doc#: [id number]    CC:

## 2022-09-12 NOTE — PLAN OF CARE
Problem: Discharge Planning  Goal: Discharge to home or other facility with appropriate resources  Outcome: Progressing  Flowsheets (Taken 9/12/2022 0945)  Discharge to home or other facility with appropriate resources: Identify barriers to discharge with patient and caregiver     Problem: Pain  Goal: Verbalizes/displays adequate comfort level or baseline comfort level  9/12/2022 1024 by Antonio Vigil RN  Outcome: Progressing     Problem: Safety - Adult  Goal: Free from fall injury  9/12/2022 1024 by Antonio Vigil RN  Outcome: Progressing     Problem: ABCDS Injury Assessment  Goal: Absence of physical injury  9/12/2022 1024 by Antonio Vigil RN  Outcome: Progressing     Problem: Skin/Tissue Integrity  Goal: Absence of new skin breakdown  Description: 1. Monitor for areas of redness and/or skin breakdown  2. Assess vascular access sites hourly  3. Every 4-6 hours minimum:  Change oxygen saturation probe site  4. Every 4-6 hours:  If on nasal continuous positive airway pressure, respiratory therapy assess nares and determine need for appliance change or resting period.   9/12/2022 1024 by Antonio Vigil RN  Outcome: Progressing     Problem: Neurosensory - Adult  Goal: Achieves stable or improved neurological status  Outcome: Progressing  Flowsheets (Taken 9/12/2022 0945)  Achieves stable or improved neurological status: Assess for and report changes in neurological status     Problem: Respiratory - Adult  Goal: Achieves optimal ventilation and oxygenation  Outcome: Progressing  Flowsheets (Taken 9/12/2022 0945)  Achieves optimal ventilation and oxygenation: Assess for changes in respiratory status     Problem: Cardiovascular - Adult  Goal: Maintains optimal cardiac output and hemodynamic stability  Outcome: Progressing  Flowsheets (Taken 9/12/2022 0945)  Maintains optimal cardiac output and hemodynamic stability: Monitor blood pressure and heart rate     Problem: Skin/Tissue Integrity - Adult  Goal: Skin integrity remains intact  Outcome: Progressing  Flowsheets (Taken 9/12/2022 0945)  Skin Integrity Remains Intact: Monitor for areas of redness and/or skin breakdown     Problem: Musculoskeletal - Adult  Goal: Return mobility to safest level of function  Outcome: Progressing  Flowsheets (Taken 9/12/2022 0945)  Return Mobility to Safest Level of Function: Assess patient stability and activity tolerance for standing, transferring and ambulating with or without assistive devices     Problem: Gastrointestinal - Adult  Goal: Minimal or absence of nausea and vomiting  Outcome: Progressing     Problem: Genitourinary - Adult  Goal: Absence of urinary retention  Outcome: Progressing     Problem: Hematologic - Adult  Goal: Maintains hematologic stability  Outcome: Progressing  Flowsheets (Taken 9/12/2022 0945)  Maintains hematologic stability: Assess for signs and symptoms of bleeding or hemorrhage

## 2022-09-12 NOTE — OP NOTE
[address]   [facility] Operative Note     Procedure Summary  Procedure Cors with grafts   Indication CHF   Consent Obtained   Access RRA   US US guidance used to determine artery patency, size (>2mm), anatomic variations and ideal puncture location. Real-time US utilized concurrent with vascular needle entry into artery. Image(s) permanently recorded and reported in chart. Bleed Risk Low   Sedation Minimal conscious sedation for patient comfort. Independent trained observer pushed medications at my direction. We monitored the patient's level of consciousness and vital signs/physiologic status throughout the procedure duration (see start and stop times above, as well as medication dosages). 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    L-LAD patent   LVEDP NA   LVG NA     RHC:  RA RV PA FANNIE WP TCO TCI JODI CHCF RA% PA%   2 30/3 35/15 23 JULIOCESAR 4.5 3.0 4.7 3.1 70 67%     Intervention(s)  None    Post Cath Dx:   CAD as above  Consider PCI of Cx in future if Cx proper geographically  from [place]ane device on CTC

## 2022-09-12 NOTE — DISCHARGE INSTRUCTIONS
Follow up with your PCP within 7-10 days of discharge. Follow up with cardiology as instructed; plan for repeat cath in 30 days  Follow up with nephrology as instructed   Follow-up with urology as instructed  Take all your medications as prescribed. Need full hematuria workup with CT/Cysto/Cytology  Chronic left flank pain pay be related to intermittently obstructing stone, ongoing for x1 yr  Needs outpatient cystoscopy left RPG possible URS for full hematuria workup. Follow up x2-3 weeks requested                     The [place] Sleepiness Scale        The [place] Sleepiness Scale is widely used in the field of sleep medicine as a subjective measure of a patient's sleepiness. The test is a list of eight situations in which you rate your tendency to become sleepy on a scale of 0, no chance to 3, high chance of dozing. Your score is based on a scale of 0 to 24. The scale estimates whether you are experiencing excessive sleepiness that possibly requires medical attention. How Sleepy Are You? How sleepy are you to doze off or fall asleep in the following situations? You should rate your chances of dozing off, not just feeling tired. Even if you have not done some of these things recently try to determine how they would have affected you.  For each situation, decide whether or not you would have:      0 = No chance of dozing         1 = Slight chance of dozing      2 = Moderate chance of  dozing         3 = High change of dozing                  Situation                                                                                                                                                                                                                                                       Chance of Dozing    Sitting and reading                                                                                                                            0 =  []  1 =    [] 2 =    [] 3 =    [x]     Watching TV                                                                                                                                     0 =  []  1 =    [] 2 =    [] 3 =    [x]                                                                                                                                                              Sitting inactive in public place (e.g., a theater or a meeting)                                                            0 =  [x]  1 =    [] 2 =    [] 3 =    []     As a passenger in a car for an hour without a break                                                                        0  =  []  1 =    [] 2 =    [] 3 =    [x]     Lying down to rest in the afternoon when circumstances permit                                                      0 =  []  1 =    [] 2 =    [] 3 =    [x]     Sitting and talking to someone                                                                                                          0 =  []  1 =    [x] 2 =    [] 3 =    []                                             Sitting quietly after a lunch without alcohol                                                                                       0 =  []  1 =    [] 2 =    [x] 3 =    []     In a car, while stopped for a few minutes in traffic                                                                            0 =  [x]  1 =    [] 2 =    [] 3 =    []     Total Score = 15     If your total score is 10 or greater, you are experiencing excessive sleepiness and should consider seeking a medical follow-up. Take a copy of this screening test to your primary care physician on your next office visit. Interpretation:       0 -   7: It is unlikely that you are abnormally sleepy. 8 -   9: You have an average amount of daytime sleepiness. 10 - 15: You may be excessively sleepy depending on the situation. You may want to consider seeking medical attention. 16 - 24:  You are excessively sleepy and should consider seeking medical attention. Coronary Angiogram: About This Test  What is a coronary angiogram?     A coronary angiogram is a test to look at the large blood vessels of your heart (coronary arteries). These blood vessels feed blood, oxygen, and nutrients to your heart. Why is this test done? This test is done to check blood flow in your coronary arteries. It can show the size and location of narrowed or blocked sections of an artery. It's done for people who have coronary artery disease, also known as heart disease. The test can show how serious the disease is and how best to treat it. Or it can be done for people who have symptoms of heart disease to find out if there is a problem with the artery. The Doctor can look at the shape of your heart, the motion of the heart, and valves in the heart. What happens during the test?  You will get medicine to help you relax. A thin tube called a catheter is put into a blood vessel in your groin or arm. You will get a shot to numb the skin where the catheter goes in. You may feel pressure when the doctor moves the catheter through your blood vessel into your heart. Dye is put into your coronary arteries through the catheter. Your doctor can see the dye as it moves through the arteries. This lets your doctor look for areas that are narrowed or blocked. You may feel hot or flushed for several seconds when the dye is put in. How long does it take? The test will take about 30 minutes to an hour. But you need time to get ready for it and time to recover. If a problem is found and the doctor treats it, it can take a few hours longer. Care of your puncture site:  Remove bandage 24 hours after the procedure. May shower in 24 hours but do not sit in a bathtub/pool of water for 5 days or until the wound is healed. Inspect the site daily and gently clean using soap and water while standing in the shower.   Dry thoroughly and apply a Band-Aid that covers the entire site. Do not apply powder or lotion. Normal Observations:  Soreness or tenderness which may last one week. Mild oozing from the incision site. Possible bruising that could last 2 weeks. Activity:  You may resume driving 24 hours following the procedure. You may resume normal activity in 5 days or after the wound heals. Avoid lifting more than 10 pounds for 5 days or until the wound heals. Avoid strenuous exercise or activity for 1 week. Nutrition:  Regular diet. Drink at least 8 to 10 glasses of decaffeinated, non-alcoholic fluid for the next 24 hours to flush the x-ray dye used for your angiogram out of your body. Call your doctor immediately if your condition worsens, for any other concerns, for a follow-up appointment or if you experience any of the following:  Significant bleeding that does not stop after 10 minutes of applying firm pressure on the puncture site. Increased swelling on the groin or leg. Unusual pain, numbness, or tingling of the groin or down the leg. Any signs of infection such as: redness, yellow drainage at the site, swelling or pain. Other Instructions:  Hold Metformin or Metformin containing drugs for 48 hours after procedure.

## 2022-09-12 NOTE — PROGRESS NOTES
home    Family Present: no Maximo Canavan was admitted to the hospital with increased shortness of breath. Patient is well known to HF team as an outpatient. She follows with HF NP. Patient does weigh herself daily. Feels in general her weight has steadilky been decreasing. Had been about 112 lb. Maybe increased to 117 lb at some point. Discussed establishing new baseline weight. Patient does try to restrict sodium at home. She does not drink over 64 oz of fluid per day. Patient is compliant with medications and follow up appointments. Patient provided with both written and verbal education on CHF signs/ symptoms, causes, discharge medications, daily weights, low sodium diet, activity, and follow-up. Pt to call if gains 3 pounds in one day or 5 pounds in one week. Mutually agreed upon goals were discussed such as calling the MD as soon as they recognize symptoms and weight gain, maintaining proper diet, taking medications as prescribed, joining cardiac rehab when able. Also reviewed importance of risk factor reduction. Patient provided with CHF Zone Management tool and CHF symptoms magnet. Discussed importance of lifestyle changes: encourage calling early with symptoms    PATIENT/CAREGIVER TEACHING:    Level of patient/caregiver understanding able to:   [ x] Verbalize understanding [ ] Demonstrate understanding [ ] Teach back   [ ] Needs reinforcement [ ] Other:       Time spent teaching: 15 mins    1. WEIGHT: Admit Weight: 116 lb (52.6 kg)      Today  Weight: 112 lb 12.8 oz (51.2 kg)   2. I/O   Intake/Output Summary (Last 24 hours) at 9/12/2022 1551  Last data filed at 9/12/2022 1057  Gross per 24 hour   Intake 240 ml   Output 463 ml   Net -223 ml       Recommendations:   1. She will need one week hospital follow up at discharge  2. Educate further on fluid restriction 48 oz- 64 oz during inpatient stay so she can understand how to measure intake at home.    3. Continue to educate on S/S.   4. Emphasize daily weights, diet, and knowing when and who to call  5. Provided patient with CHF Resource Line for questions and concerns. 6. Patient would benefit from cardiac rehab as an outpatient. Flyer provided.        NEVA PERSAUD RN 9/12/2022 3:51 PM

## 2022-09-12 NOTE — PRE SEDATION
[facility] Cardiology  Brief Pre-Op Note/Sedation Assessment    Abbe Kawasaki  1940  [id number]  11:06 AM    Planned Procedure: Cardiac Catheterization Procedure  Post Procedure Plan: Return to same level of care  Consent:   I have discussed with the patient and/or the patient representative the indication, alternatives, and the possible risks and/or complications of the planned procedure and the anesthesia methods. The patient and/or patient representative appear to understand and agree to proceed. Chief Complaint:   Anginal Equivalent  Dyspnea on Exertion  Dyspnea  Fatigue     Indications for Procedure:  Presentation New Onset Angina <= 2 months and Worsening Angina   Anginal Class (2 wks) CCS III - Symptoms with everyday living activities, i.e., moderate limitation   Anginal Sx Typical CP   CHF Class (2wks) Yes:  Heart Failure Type: Systolic Severity:  Class III - Symptoms of HF on less-than-ordinary exertion   CV Instability Yes     Prior Ischemic Workup/Eval:  Pre-Proc Meds Yes: Aspirin and Beta Blockers   Stress Test? Yes:  Stress or Imaging Studies Performed (within ANY time period):   Type:  Stress Nuclear  Results:  Positive:  Myocardial Perfusion Defects (Nuclear) Extent of Ischemia:  Intermediate     Does Patient need surgery? Cardiac Valve Surgery No     Pre-Procedure Medical History:  Vital Signs BP (!) 163/84   Pulse 84   Temp 97.9 °F (36.6 °C) (Oral)   Resp 18   Ht 5' 3\" (1.6 m)   Wt 112 lb 12.8 oz (51.2 kg)   SpO2 96%   BMI 19.98 kg/m²    Allergies Reviewed in EMR   Medications Reviewed in EMR   Past Med Hx Reviewed in EMR   Surg Hx Reviewed in EMR     Pre-Sedation:  Pre-Sedation Exam I have assessed the patient and reviewed the H&P on the chart. Hx Anesthesia Comps None   Mod Mallampati II   ASA Class Class 2 - A normal healthy patient with mild systemic disease     Tess Scale:   Activity 2 - Able to move 4 extrem on command   Respiration 2 - Able to breath deeply and cough freely Circulation 2 - BP +/- 20mmHg of normal   Consciousness 2 - Fully awake   Oxygen Saturation 2 - Able to maintain oxygen sat >92% on room air     Sedation/Anesthesia Plan:  Guard patient safety and welfare. Minimize physical discomfort and pain. Minimize negative psychological responses to treatment by providing sedation and analgesia and maximize the potential amnesia. Patient to meet pre-procedure discharge plan.      Medication Planned:  Midazolam intravenously and fentanyl intravenously     Patient is an appropriate candidate for plan of sedation:   Yes    Electronically signed by Yajaira Lowery MD on 9/12/2022 at 11:06 AM

## 2022-09-12 NOTE — CONSULTS
MD Mack Doyle MD Normajean Cheney, MD                Office: [phone]                      Fax: [phone]               [email or web address]                   Nephrology consult received. -- full consult report will follow. Chart reviewed. .   Thank you for allowing me to participate in this patient's care. Please do not hesitate to contact us anytime. We will follow along with you.      Bernice Kemp MD  [facility] [address] [place] [address]   [phone] or [employer]    =====================================================

## 2022-09-12 NOTE — PROGRESS NOTES
HOSPITALISTS PROGRESS NOTE    9/12/2022 8:29 AM        Name: Dallin Dumont . Admitted: 9/10/2022  Primary Care Provider: Juan Jose Morel MD (Tel: [phone])      Brief Course: This 80-year-old female with PMHx of chronic A. Fib s/p watchman procedure, CAD, hypertension, hyperlipidemia, CKD stage II presented with worsening exertional dyspnea from past 1 week and yesterday started to have shortness of breath upon lying down flat. Of note, patient underwent stress test around end of August which was positive and patient was supposed to have cath on Monday. Interval history:   Pt seen and examined today. Overnight events noted, interval ancillary notes and labs reviewed. On room air satting well, afebrile overnight, WBCs trended down  remains in A. fib with heart rate around 84  denied cough, SOB, chest pain, nausea, vomiting or abdominal pain  Plan for cardiac catheter      Assessment & Plan:     Acute on on chronic diastolic CHF  CXR showed mild pulmonary vascular congestion elevated proBNP on admission: Continue Lasix  Last echo on 7/20/22 showed EF of 55 to 60%, moderate mitral & tricuspid regurg and mild pulmonary hypertension  Cardiology consulted; plan for cardiac catheter  Monitor on telemetry. Supplemental oxygen if needed to keep sats     Exertional dyspnea likely secondary to above    Hx of chronic A. fib: s/p watchmans. Continue metoprolol    CAD: Status post PCI to LAD.   Continue aspirin, Plavix and statins    UTI: Urine analysis suggestive of UTI; started on ceftriaxone awaiting cultures and sensitivity    Hematuria:   CT abdomen and pelvis showed few subcentimeter hypodense nodule in right and left kidney suggestive of hemorrhagic or proteinaceous cyst  Urology on board; plan for outpatient work-up of hematuria with CT/cystoscopy cytology    Hypothyroidism: Continue Synthroid    CKD stage III: Creatinine 1.2 on admission; trended up to 1.3  Nephrology consulted: Appreciate their recs  Avoid nephrotoxin, strict intake output, daily weights and monitor renal function closely        DVT prophylaxis: lovenox  Code:Full Code    Disposition: Once acute medical issues have resolved    Current Medications  ziprasidone (GEODON) injection 20 mg, Once  potassium chloride (KLOR-CON M) extended release tablet 40 mEq, PRN   Or  potassium bicarb-citric acid (EFFER-K) effervescent tablet 40 mEq, PRN   Or  potassium chloride 10 mEq/100 mL IVPB (Peripheral Line), PRN  hydrALAZINE (APRESOLINE) injection 10 mg, Q6H PRN  cefTRIAXone (ROCEPHIN) 1000 mg in sterile water 10 mL IV syringe, Q24H  albuterol sulfate HFA (PROVENTIL;VENTOLIN;PROAIR) 108 (90 Base) MCG/ACT inhaler 2 puff, 4x Daily PRN  aspirin EC tablet 81 mg, Daily  clopidogrel (PLAVIX) tablet 75 mg, Daily  fluticasone (FLONASE) 50 MCG/ACT nasal spray 2 spray, Daily  levothyroxine (SYNTHROID) tablet 125 mcg, Daily  HYDROcodone-acetaminophen (NORCO) 5-325 MG per tablet 1 tablet, 4x Daily PRN  metoprolol succinate (TOPROL XL) extended release tablet 12.5 mg, Daily  sodium chloride flush 0.9 % injection 5-40 mL, 2 times per day  sodium chloride flush 0.9 % injection 5-40 mL, PRN  0.9 % sodium chloride infusion, PRN  enoxaparin Sodium (LOVENOX) injection 30 mg, Daily  ondansetron (ZOFRAN-ODT) disintegrating tablet 4 mg, Q8H PRN  polyethylene glycol (GLYCOLAX) packet 17 g, Daily PRN  acetaminophen (TYLENOL) tablet 650 mg, Q6H PRN   Or  acetaminophen (TYLENOL) suppository 650 mg, Q6H PRN      Objective:  BP (!) 163/84   Pulse 84   Temp 97.9 °F (36.6 °C) (Oral)   Resp 18   Ht 5' 3\" (1.6 m)   Wt 112 lb 12.8 oz (51.2 kg)   SpO2 96%   BMI 19.98 kg/m²     Intake/Output Summary (Last 24 hours) at 9/12/2022 0829  Last data filed at 9/11/2022 2325  Gross per 24 hour   Intake 490 ml   Output 1050 ml   Net -560 ml        Wt Readings from Last 3 Encounters:   09/12/22 112 lb 12.8 oz (51.2 kg)   09/06/22 120 lb (54.4 kg)   09/01/22 120 lb (54.4 kg)       Physical Examination:   General appearance:  No apparent distress, appears stated age and cooperative. HEENT: Normocephalic, sclera clear., PERRLA. Trachea midline, no adenopathy. Cardiovascular: Regular rate and rhythm, normal S1, S2. No murmur. Respiratory:Clear to auscultation bilaterally, no wheeze or crackles. GI: Abdomen soft, no tenderness, not distended, normal bowel sounds  Musculoskeletal: No cyanosis in digits. No BLE edema present  Neurology: CN 2-12 grossly intact. No speech or motor deficits  Psych: Not agitated, appropriate affect  Skin: Warm, dry, normal turgor    Labs and Tests:  CBC:   Recent Labs     09/11/22 0212 09/11/22  0718 09/12/22  0517   WBC 13.2* 15.2* 8.9   HGB 14.3 13.7 13.6    358 334       BMP:    Recent Labs     09/11/22 0212 09/11/22  0718 09/12/22  0517    138 142   K 3.3* 3.4* 3.7    107 109   CO2 19* 18* 23   BUN 27* 25* 28*   CREATININE 1.2 1.1 1.3*   GLUCOSE 119* 138* 94       Hepatic:   Recent Labs     09/10/22  1634 09/11/22 0212   AST 31 19   ALT 9* 9*   BILITOT 0.3 0.6   ALKPHOS 91 110       XR CHEST PORTABLE   Final Result   Mild pulmonary vascular congestion. Stable cardial pericardial silhouette   enlargement. Otherwise unremarkable portable chest radiograph. Problem List  Principal Problem:    Exertional dyspnea  Resolved Problems:    * No resolved hospital problems.  Lenny Ochoa MD   9/12/2022 8:29 AM

## 2022-09-12 NOTE — CONSULTS
Verma Evans, MD Calla Cahill, MD Atilano Baumgarten, MD               Office: [phone]                      Fax: [phone]             [facility]                   NEPHROLOGY INITIAL CONSULT NOTE:     PATIENT NAME: Marquita Ojeda  : 1940  MRN: [id number]  REASON FOR CONSULT: For evaluation and management of CKD plans for left heart cath. (My recommendations will be communicated by way of shared medical record.)      RECOMMENDATIONS:   -   Re: CI-GIANA:   - This patient is at High risk: per Giovany risk scoring:   - would suggest to avoid in non-urgent situation  - would try to maximize PEARL prophyalaxic measure with :     : no IVFs -due to heart failure      -: adjust diuretics per LVEDP      + High dose statin  -defer to cardiologist     + NAC + Ascorbic acid (these may help, but inconsistent data)     + ACE-I hold for now    -Hold further diuresis  -Given IV Lasix with good response  -Was on torsemide 10/20-alternative days  -Was on Aldactone,    -Urology follow-up for gross hematuria,  -Follow-up urine culture  - at higher risk for decompensation, needing closer monitoring. D/C plan from renal stand point: Expect 24-48 hours after LHC   -Follows with me, next appointment on 2022    Discussed with patient, her daughter, team-hospitalist      IMPRESSION:       Admitted for:  SCHMID (dyspnea on exertion) [R06.00]  Exertional dyspnea [R06.00]      Non-proteinuric CKD: 3B   - BL Scr-lowest recently about 1.2-1.3, same on admission-   -Thought to be due to hypertension, cardiorenal physiology  -Follows with me in the office      Associated problems:   Acute on chronic-systolic heart failure-  CXR - Cardiomegaly with mild pulmonary venous congestion.    Weight in past around 116-119 pounds    - Volume status: hyper-volemic  : BP: no need for tight control   : Na: controlled    - Azotemia: Prerenal  - Electrolytes: K: Hypokalemia  - Acid-Base: Acidosis, non-anion gap, metabolic  - Anemia: none       Other major problems: Management per primary and other consulting teams. Paroxysmal A. Fib    Urinary tract infection        Hospital Problems             Last Modified POA    * (Principal) Exertional dyspnea 9/10/2022 Yes         : other supportive care :   - Check daily renal function panel with electrolytes-phosphorus  - Strict monitoring of I/Os, daily weight  - Renal feeds/diet  - Current medications reviewed. - Nephrotoxic medications have been discontinued. - Dose adjusted and appropriate. - Dose meds for eGFR <15 mL/min/1.73m2 during GIANA    - Avoid heavy opioids due to renal failure - may use very low dose dilaudid / fentanyl with close monitoring of CNS and respiratory depression. Please refer to the orders. High Complexity. Multiple complex problems. Discussed with patient and treatment team-    Time spent > 30 (~35) minutes. Thank you for allowing me to participate in this patient's care. Please do not hesitate to contact me anytime. We will follow along with you.        Hilario Richardson MD,  [facility] [address] [place] [address]  Office: [phone] or [facility] [employer]  Fax: [phone]        =======================================================================================   =======================================================================================      CHIEF COMPLAINT:   Chief Complaint   Patient presents with    Shortness of Breath    Atrial Fibrillation     Sent by dr Valeriy lAdana to have angiogram on monday     History Obtained From:  patient, family member -daughter + treatment team + Electronic Medical Records    HPI: Ms. Montez Garcia is a 80 y.o. female with significant past medical history as below:   Past Medical History:   Diagnosis Date    Allergic rhinitis, cause unspecified     Arthritis     Atrial fibrillation ([address]     Bronchopneumonia     CAD (coronary artery disease)     stent:  post cataract surgery (CABG)    Cerebral artery occlusion with cerebral infarction [facility])     TIA    Chronic gouty arthropathy     Chronic kidney disease     Congestive heart failure, unspecified     Degeneration of cervical intervertebral disc     Essential and other specified forms of tremor     Gout     HIGH CHOLESTEROL     History of CVA (cerebrovascular accident)     Hx of blood clots     Hypertension     Influenza 12/23/2017    Leakage of Watchman left atrial appendage closure device     Mitral valve stenosis and aortic valve insufficiency     Movement disorder     back problems    Pacemaker     Peptic ulcer, unspecified site, unspecified as acute or chronic, without mention of hemorrhage, perforation, or obstruction     Thyroid disease     Unspecified disorder of kidney and ureter     Unspecified hypothyroidism       Presents with Shortness of Breath and Atrial Fibrillation (Sent by dr Gloria Sosa to have angiogram on monday)    Admitted with SCHMID (dyspnea on exertion) [R06.00]  Exertional dyspnea [R06.00]   Found to have elevated creatinine-follows with me, with heart failure and CKD with recent GIANA also. , so we are called for that. No current active complaints. Patient denied chest pain / dizziness/lightheadedness/syncope/   Patient did have SOB / leg edema. *  Regarding: CKD  Duration: chronic  Location: kidneys  Severity: Severe   Timing: continous  Context (ex: related to condition):  , refer to my assessment / impression. Modifying factors (ex: medications, interventions):  , refer to my plan / recommendation. Associated signs & symptoms (ex: edema, SOB): As mentioned above in CC and HPI      Past medical, Surgical, Social, Family medical history reviewed by me.      PAST MEDICAL HISTORY:   Past Medical History:   Diagnosis Date    Allergic rhinitis, cause unspecified     Arthritis     Atrial fibrillation ([address]     Bronchopneumonia     CAD (coronary artery disease)     stent:  post cataract surgery (CABG)    Cerebral artery occlusion with cerebral infarction [facility])     TIA    Chronic gouty arthropathy     Chronic kidney disease     Congestive heart failure, unspecified     Degeneration of cervical intervertebral disc     Essential and other specified forms of tremor     Gout     HIGH CHOLESTEROL     History of CVA (cerebrovascular accident)     Hx of blood clots     Hypertension     Influenza 12/23/2017    Leakage of Watchman left atrial appendage closure device     Mitral valve stenosis and aortic valve insufficiency     Movement disorder     back problems    Pacemaker     Peptic ulcer, unspecified site, unspecified as acute or chronic, without mention of hemorrhage, perforation, or obstruction     Thyroid disease     Unspecified disorder of kidney and ureter     Unspecified hypothyroidism      PAST SURGICAL HISTORY:   Past Surgical History:   Procedure Laterality Date    BACK SURGERY      CARDIAC CATHETERIZATION  7/2012    CARDIAC CATHETERIZATION  08/07/2018    Unsuccesful  of RCA    CARDIAC SURGERY      CABG & Cardiac ablation    CHOLECYSTECTOMY      CORONARY ARTERY BYPASS GRAFT  1987    LIMA- Diag/LAD, SVG- RCA    FEMORAL BYPASS Left 8/22/2019    LEFT FEMORAL TO POPLITEAL BYPASS GRAFT performed by Dez Beaulieu MD at [address] [place] [address] N/A 8/22/2019    FEMORAL TO FEMORAL BYPASS performed by Dez eBaulieu MD at [facility] Left 03/16/2017    Left hip pinning    IR KYPHOPLASTY LUMBAR FIRST LEVEL  5/14/2021    IR KYPHOPLASTY LUMBAR FIRST LEVEL 5/14/2021 [facility] SPECIAL PROCEDURES    JOINT REPLACEMENT      SC NJX AA&/STRD TFRML EPI LUMBAR/SACRAL 1 LEVEL Right 12/3/2018    RIGHT L3 AND L4 LUMBAR TRANSFORAMINAL EPIRUAL STEROID INJECTION WITH FLUOROSCOPY performed by Matthew Zavala MD at [facility]    PTCA  11/04/2014    MARLENY - 3.0 x 28 to the Ist Diag    SHOULDER SURGERY      left     FAMILY HISTORY:   Family History   Problem Relation Age of Onset    Cancer Sister     Heart Disease Sister     Diabetes Brother Hypertension Brother     Heart Disease Brother     Stroke Maternal Aunt     Heart Disease Maternal Aunt     Diabetes Maternal Uncle     Hypertension Paternal Aunt     Cancer Maternal Grandmother     Cancer Paternal Grandmother     Rheum Arthritis Neg Hx     Lupus Neg Hx      SOCIAL HISTORY:   Social History     Socioeconomic History    Marital status:      Spouse name: [address] [place] [address]    Number of children: 3    Years of education: 12   Tobacco Use    Smoking status: Former     Packs/day: 1.00     Years: 28.00     Pack years: 28.00     Types: Cigarettes     Quit date: 1987     Years since quittin.7    Smokeless tobacco: Never    Tobacco comments:     H.O.smoking at age 15 / smoked up to 1 p.p.d / quit    Vaping Use    Vaping Use: Never used   Substance and Sexual Activity    Alcohol use: Yes     Comment: social    Drug use: No    Sexual activity: Not Currently     Social Determinants of Health     Financial Resource Strain: Low Risk     Difficulty of Paying Living Expenses: Not hard at all   Food Insecurity: No Food Insecurity    Worried About [address] in the Last Year: Never true    [address]  [employer] in the Last Year: Never true   Transportation Needs: No Transportation Needs    Lack of Transportation (Medical): No    Lack of Transportation (Non-Medical): No   Physical Activity: Inactive    Days of Exercise per Week: 0 days    Minutes of Exercise per Session: 0 min   Stress: No Stress Concern Present    Feeling of Stress : Not at all   Social Connections: Socially Isolated    Frequency of Communication with Friends and Family: Three times a week    Frequency of Social Gatherings with Friends and Family: Three times a week    Attends Jain Services: Never    Active Member of Clubs or Organizations: No    Attends Club or Organization Meetings: Never    Marital Status:     Housing Stability: Low Risk     Unable to Pay for Housing in the Last Year: No    Number of Places Lived in the Last Year: 1    Unstable Housing in the Last Year: No         MEDICATIONS: reviewed by me. Medications Prior to Admission:  No current facility-administered medications on file prior to encounter. Current Outpatient Medications on File Prior to Encounter   Medication Sig Dispense Refill    methylPREDNISolone (MEDROL) 4 MG tablet Take 0.5 tablets by mouth daily for 6 days 3 tablet 0    HYDROcodone-acetaminophen (NORCO) 5-325 MG per tablet Take 1 tablet by mouth 4 times daily as needed for Pain for up to 30 days. 120 tablet 0    torsemide (DEMADEX) 10 MG tablet 20 mg 2 days of the week and 10 mg the rest 135 tablet 1    albuterol sulfate HFA (VENTOLIN HFA) 108 (90 Base) MCG/ACT inhaler Inhale 2 puffs into the lungs 4 times daily as needed for Wheezing 18 g 0    vitamin D (ERGOCALCIFEROL) 1.25 MG (91284 UT) CAPS capsule TAKE 1 CAPSULE ONCE A WEEK 12 capsule 1    aspirin EC 81 MG EC tablet Take 1 tablet by mouth in the morning. 90 tablet 1    clopidogrel (PLAVIX) 75 MG tablet Take 1 tablet by mouth in the morning. 30 tablet 3    allopurinol (ZYLOPRIM) 100 MG tablet TAKE 1 TABLET EVERY DAY 90 tablet 1    levothyroxine (SYNTHROID) 125 MCG tablet Take 1 tablet by mouth Daily 90 tablet 1    topiramate (TOPAMAX) 50 MG tablet TAKE 2 TABLETS TWICE DAILY 360 tablet 1    nitroGLYCERIN (NITROLINGUAL) 0.4 MG/SPRAY 0.4 mg spray Place 1 spray under the tongue every 5 minutes as needed for Chest pain 4.9 g 1    fluticasone (FLONASE) 50 MCG/ACT nasal spray 2 sprays by Each Nostril route daily 16 g 0    metoprolol succinate (TOPROL XL) 25 MG extended release tablet Take 0.5 tablets by mouth daily . 5 tablet daily 45 tablet 0         Current Facility-Administered Medications:     ziprasidone (GEODON) injection 20 mg, 20 mg, IntraMUSCular, Once, Sharmin Henderson PA-C    potassium chloride (KLOR-CON M) extended release tablet 40 mEq, 40 mEq, Oral, PRN **OR** potassium bicarb-citric acid (EFFER-K) effervescent tablet 40 mEq, 40 mEq, Oral, PRN, 40 mEq at 09/11/22 1805 **OR** potassium chloride 10 mEq/100 mL IVPB (Peripheral Line), 10 mEq, IntraVENous, PRN, Aruna Hassan MD    hydrALAZINE (APRESOLINE) injection 10 mg, 10 mg, IntraVENous, Q6H PRN, Aruna Hassan MD    cefTRIAXone (ROCEPHIN) 1000 mg in sterile water 10 mL IV syringe, 1,000 mg, IntraVENous, Q24H, Aruna Hassan MD, 1,000 mg at 09/11/22 1805    albuterol sulfate HFA (PROVENTIL;VENTOLIN;PROAIR) 108 (90 Base) MCG/ACT inhaler 2 puff, 2 puff, Inhalation, 4x Daily PRN, Pepper Perez MD, 2 puff at 09/11/22 0141    aspirin EC tablet 81 mg, 81 mg, Oral, Daily, Pepper Perez MD, 81 mg at 09/11/22 0915    clopidogrel (PLAVIX) tablet 75 mg, 75 mg, Oral, Daily, Pepper Perez MD, 75 mg at 09/11/22 0914    fluticasone (FLONASE) 50 MCG/ACT nasal spray 2 spray, 2 spray, Each Nostril, Daily, Pepper Perez MD    levothyroxine (SYNTHROID) tablet 125 mcg, 125 mcg, Oral, Daily, Pepper Perez MD, 125 mcg at 09/12/22 0529    HYDROcodone-acetaminophen (NORCO) 5-325 MG per tablet 1 tablet, 1 tablet, Oral, 4x Daily PRN, Pepper Perez MD, 1 tablet at 09/12/22 0736    metoprolol succinate (TOPROL XL) extended release tablet 12.5 mg, 12.5 mg, Oral, Daily, Pepper Perez MD, 12.5 mg at 09/11/22 0914    sodium chloride flush 0.9 % injection 5-40 mL, 5-40 mL, IntraVENous, 2 times per day, Pepper Perez MD, 10 mL at 09/11/22 2028    sodium chloride flush 0.9 % injection 5-40 mL, 5-40 mL, IntraVENous, PRN, Pepper Perez MD, 10 mL at 09/11/22 1805    0.9 % sodium chloride infusion, , IntraVENous, PRN, Pepper Perez MD    enoxaparin Sodium (LOVENOX) injection 30 mg, 30 mg, SubCUTAneous, Daily, Pepper Perez MD, 30 mg at 09/11/22 0914    ondansetron (ZOFRAN-ODT) disintegrating tablet 4 mg, 4 mg, Oral, Q8H PRN **OR** [DISCONTINUED] ondansetron (ZOFRAN) injection 4 mg, 4 mg, IntraVENous, Q6H PRN, Pepper Perez MD    polyethylene glycol (GLYCOLAX) packet 17 g, 17 g, Oral, Daily PRN, Ashish West MD    acetaminophen (TYLENOL) tablet 650 mg, 650 mg, Oral, Q6H PRN **OR** acetaminophen (TYLENOL) suppository 650 mg, 650 mg, Rectal, Q6H PRN, Ashish West MD      Allergies reviewed by me: Aspirin, Diltiazem, Diltiazem hcl, Lorazepam, Sulfa antibiotics, Atorvastatin, Dabigatran, Mysoline [primidone], Nsaids, Other, and Primidone    REVIEW OF SYSTEMS:  As mentioned in chief complaint and HPI , Subjective   All other 10-point review of systems: negative.      =======================================================================================     PHYSICAL EXAM:  Recent vital signs and recent I/Os reviewed by me. Wt Readings from Last 3 Encounters:   09/12/22 112 lb 12.8 oz (51.2 kg)   09/06/22 120 lb (54.4 kg)   09/01/22 120 lb (54.4 kg)     BP Readings from Last 3 Encounters:   09/12/22 (!) 163/84   09/01/22 (!) 142/61   08/23/22 110/60     Patient Vitals for the past 24 hrs:   BP Temp Temp src Pulse Resp SpO2 Weight   09/12/22 0821 -- -- -- -- -- -- 112 lb 12.8 oz (51.2 kg)   09/12/22 0736 -- -- -- -- 18 -- --   09/12/22 0734 (!) 163/84 97.9 °F (36.6 °C) Oral 84 16 96 % --   09/12/22 0403 (!) 147/68 97.8 °F (36.6 °C) Oral 82 18 97 % --   09/12/22 0321 -- -- -- 82 -- -- --   09/11/22 2327 (!) 145/63 98 °F (36.7 °C) Oral 81 16 97 % --   09/11/22 2158 -- -- -- 70 -- -- --   09/11/22 1915 (!) 145/60 97.9 °F (36.6 °C) Oral 72 16 98 % --   09/11/22 1629 (!) 158/71 97.8 °F (36.6 °C) -- 73 14 97 % --   09/11/22 1206 (!) 147/61 98.2 °F (36.8 °C) Oral 72 -- 97 % --   09/11/22 1115 (!) 163/73 97.9 °F (36.6 °C) -- 71 16 98 % --       Physical Exam  Vitals reviewed. Constitutional:       General: She is not in acute distress. Appearance: Normal appearance. She is ill-appearing. HENT:      Head: Normocephalic and atraumatic.       Right Ear: External ear normal.      Left Ear: External ear normal.      Nose: Nose normal.      Mouth/Throat:      Mouth: Mucous membranes are moist. Mucous membranes are not dry. Eyes:      General: No scleral icterus. Conjunctiva/sclera: Conjunctivae normal.   Neck:      Vascular: No JVD. Cardiovascular:      Rate and Rhythm: Normal rate and regular rhythm. Heart sounds: S1 normal and S2 normal.   Pulmonary:      Effort: Pulmonary effort is normal. No respiratory distress. Breath sounds: Rhonchi present. Abdominal:      General: Bowel sounds are normal. There is no distension. Musculoskeletal:         General: Swelling present. No deformity. Cervical back: Normal range of motion and neck supple. Skin:     General: Skin is dry. Coloration: Skin is not jaundiced. Neurological:      Mental Status: She is alert and oriented to person, place, and time. Mental status is at baseline. Psychiatric:         Mood and Affect: Mood normal.         Behavior: Behavior normal.          Intake/Output Summary (Last 24 hours) at 9/12/2022 0917  Last data filed at 9/11/2022 2325  Gross per 24 hour   Intake 440 ml   Output 700 ml   Net -260 ml           =======================================================================================     DATA:  Diagnostic tests reviewed by me for today's visit:   (AS NEEDED FOR MY EVALUATION AND MANAGEMENT).        Recent Labs     09/10/22  1634 09/11/22 0212 09/11/22 0718 09/12/22  0517   WBC 10.1 13.2* 15.2* 8.9   HCT 42.7 45.8 45.3 43.4    374 358 334     Iron Saturation:  No components found for: PERCENTFE  FERRITIN:  No results found for: FERRITIN  IRON:    Lab Results   Component Value Date/Time    IRON 54 11/09/2020 11:05 AM     TIBC:    Lab Results   Component Value Date/Time    TIBC 216 11/09/2020 11:05 AM       Recent Labs     09/10/22  1634 09/11/22  0212 09/11/22 0718 09/12/22  0517    142 138 142   K 4.7 3.3* 3.4* 3.7   * 108 107 109   CO2 22 19* 18* 23   BUN 31* 27* 25* 28*   CREATININE 1.3* 1.2 1.1 1.3*     Recent Labs     09/10/22  1634 09/11/22  9309 09/11/22  9393 09/12/22  0517   CALCIUM 9.0 9.4 8.8 9.1   MG  --  2.00 2.10  --      No results for input(s): PH, PCO2, PO2 in the last 72 hours.     Invalid input(s): Olman Gutierrez    ABG:  No results found for: PH, PCO2, PO2, HCO3, BE, THGB, TCO2, O2SAT  VBG:    Lab Results   Component Value Date/Time    PHVEN 7.37 03/20/2017 05:26 AM    WFY4ZOW 29.7 03/20/2017 05:26 AM    BEVEN -7.1 03/20/2017 05:26 AM    Y3JGTCOK 99 03/20/2017 05:26 AM       LDH:  No results found for: LDH  Uric Acid:    Lab Results   Component Value Date/Time    LABURIC 4.8 05/28/2019 12:31 PM       PT/INR:    Lab Results   Component Value Date/Time    PROTIME 13.5 01/22/2022 02:16 PM    INR 1.19 01/22/2022 02:16 PM     Warfarin PT/INR:  No components found for: PTPATWAR, PTINRWAR  PTT:    Lab Results   Component Value Date/Time    APTT 42.5 01/22/2022 02:16 PM   [APTT}  Last 3 Troponin:    Lab Results   Component Value Date/Time    TROPONINI <0.01 09/10/2022 09:07 PM    TROPONINI <0.01 09/10/2022 04:34 PM    TROPONINI <0.01 09/01/2022 07:15 AM       U/A:    Lab Results   Component Value Date/Time    COLORU ORANGE 09/11/2022 12:31 PM    PROTEINU TRACE 09/11/2022 12:31 PM    PHUR 6.5 09/11/2022 12:31 PM    WBCUA 20 09/11/2022 12:31 PM    RBCUA 715 09/11/2022 12:31 PM    YEAST neg 03/09/2021 02:00 PM    BACTERIA None Seen 09/11/2022 12:31 PM    CLARITYU Clear 09/11/2022 12:31 PM    SPECGRAV 1.010 09/11/2022 12:31 PM    LEUKOCYTESUR MODERATE 09/11/2022 12:31 PM    UROBILINOGEN 0.2 09/11/2022 12:31 PM    BILIRUBINUR Negative 09/11/2022 12:31 PM    BILIRUBINUR NEGATIVE 12/04/2011 06:40 PM    BLOODU LARGE 09/11/2022 12:31 PM    GLUCOSEU Negative 09/11/2022 12:31 PM    GLUCOSEU NEGATIVE 12/04/2011 06:40 PM     Microalbumen/Creatinine ratio:  No components found for: RUCREAT  24 Hour Urine for Protein:  No components found for: RAWUPRO, UHRS3, FDMH61XU, UTV3  24 Hour Urine for Creatinine Clearance:  No components found for: CREAT4, UHRS10, UTV10  Urine Toxicology:  No components found for: Pollok Osler, IBENZO, ICOCAINE, IMARTHC, IOPIATES, IPHENCYC    HgBA1c:    Lab Results   Component Value Date/Time    LABA1C 5.5 01/23/2022 04:22 AM     RPR:  No results found for: RPR  HIV:  No results found for: HIV  REMY:  No results found for: ANATITER, REMY  RF:  No results found for: RF  DSDNA:  No components found for: DNA  AMYLASE:  No results found for: AMYLASE  LIPASE:  No results found for: LIPASE  Fibrinogen Level:  No components found for: FIB       BELOW MENTIONED RADIOLOGY STUDY RESULTS BY ME (AS NEEDED FOR MY EVALUATION AND MANAGEMENT). XR CHEST (2 VW)    Result Date: 9/1/2022  EXAMINATION: TWO XRAY VIEWS OF THE CHEST 9/1/2022 7:11 am COMPARISON: 05/27/2022 HISTORY: ORDERING SYSTEM PROVIDED HISTORY: palpitations. no chest pain or fever or cough. longstanding dizziness TECHNOLOGIST PROVIDED HISTORY: Reason for exam:->palpitations. no chest pain or fever or cough. longstanding dizziness FINDINGS: Left-sided bipolar cardiac pacer is stable in configuration. The heart is enlarged with mild central pulmonary venous congestion there is no consolidation, pneumothorax or effusion. 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 Cardiomegaly with mild pulmonary venous congestion.

## 2022-09-12 NOTE — CONSULTS
Urology Consult Note  [facility]     Patient: Alice Crane MRN: [id number]  Room/Bed: X-9811/3547-75   YOB: 1940  Age/Sex: 80 y. o.female  Admission Date: 9/10/2022     Date of Service:  9/12/2022    Consulting Provider: TIFFANIE Bravo - CNP  Admitting/Requesting Physician: Jhonatan Johnson MD  Primary Care Physician: Marcus Moody MD    Reason for Consult: Gross Hematuria    ASSESSMENT/PLAN     79 yo female admitted with exertional dyspnea with hx of CAD and CHF. Urology has been consulted for gross hematuria. She has a purewick in place with jinny urine. Cr is 1.3 today. Microscopy with numerous RBC, 20 wbc, culture pending. Complains of chronic left sided flank pain for over a year. Denies LUTS. No hx of kidney stones in past.     Recommendations:  Need full hematuria workup with CT/Cysto/Cytology  CT a/p WO today  Urology will follow, call with any questions      All the patients questions were answered. She understands the plan as listed above. HISTORY     Chief Complaint:   Chief Complaint   Patient presents with    Shortness of Breath    Atrial Fibrillation     Sent by dr Jez Syed to have angiogram on monday       History of Present Illness: Alice Crane is a 80 y.o. female with hematuria. Onset of symptoms was several days ago with improving course since that time. Symptoms are aggravated by possible left chronic flank pain related to a stone. Symptoms improved with fluids. Associated symptoms include chronic dull left sided flank pain. Patient also reports no history of stones or blood in urine previously. She has tried the following treatments: CT scan fluids.      Past Medical History:  She has a past medical history of Allergic rhinitis, cause unspecified, Arthritis, Atrial fibrillation ([address] Bronchopneumonia, CAD (coronary artery disease), Cerebral artery occlusion with cerebral infarction [facility]), Chronic gouty arthropathy, Chronic kidney disease, Congestive heart failure, unspecified, Degeneration of cervical intervertebral disc, Essential and other specified forms of tremor, Gout, HIGH CHOLESTEROL, History of CVA (cerebrovascular accident), blood clots, Hypertension, Influenza (12/23/2017), Leakage of Watchman left atrial appendage closure device, Mitral valve stenosis and aortic valve insufficiency, Movement disorder, Pacemaker, Peptic ulcer, unspecified site, unspecified as acute or chronic, without mention of hemorrhage, perforation, or obstruction, Thyroid disease, Unspecified disorder of kidney and ureter, and Unspecified hypothyroidism. Past Surgical History:  She has a past surgical history that includes back surgery; Cholecystectomy; Cardiac surgery; Coronary artery bypass graft (1987); shoulder surgery; Cardiac catheterization (7/2012); hip surgery (Left, 03/16/2017); joint replacement; Cardiac catheterization (08/07/2018); Percutaneous Transluminal Coronary Angio (11/04/2014); pr njx aa&/strd tfrml epi lumbar/sacral 1 level (Right, 12/3/2018); Femoral-femoral Bypass Graft (N/A, 8/22/2019); femoral bypass (Left, 8/22/2019); and IR KYPHOPLASTY LUMBAR 1 VERTEBRAL BODY (5/14/2021). Allergies:   Allergies   Allergen Reactions    Aspirin Nausea Only    Diltiazem Anaphylaxis    Diltiazem Hcl Anaphylaxis    Lorazepam Other (See Comments)     hallucinations  hallucinations  hallucinations    Sulfa Antibiotics Rash and Hives    Atorvastatin Other (See Comments)     Muscle pains  Muscle pains  Muscle pains    Dabigatran Nausea Only     And indigestion  And indigestion    Aka Pradaxa  And indigestion  And indigestion  And indigestion    Aka Pradaxa  And indigestion  And indigestion  And indigestion    Aka Pradaxa    Mysoline [Primidone]     Nsaids      Other reaction(s): Unknown    Other Other (See Comments)     Nitroglycerin patches causes severe headaches    Primidone      Other reaction(s): Unknown        Social History:  She reports that she quit smoking about 35 years ago. Her smoking use included cigarettes. She has a 28.00 pack-year smoking history. She has never used smokeless tobacco. She reports current alcohol use. She reports that she does not use drugs. Family History:  family history includes Cancer in her maternal grandmother, paternal grandmother, and sister; Diabetes in her brother and maternal uncle; Heart Disease in her brother, maternal aunt, and sister; Hypertension in her brother and paternal aunt; Stroke in her maternal aunt. Medications:  Scheduled Meds:   ziprasidone  20 mg IntraMUSCular Once    cefTRIAXone (ROCEPHIN) IV  1,000 mg IntraVENous Q24H    aspirin EC  81 mg Oral Daily    clopidogrel  75 mg Oral Daily    fluticasone  2 spray Each Nostril Daily    levothyroxine  125 mcg Oral Daily    metoprolol succinate  12.5 mg Oral Daily    sodium chloride flush  5-40 mL IntraVENous 2 times per day    enoxaparin  30 mg SubCUTAneous Daily     Continuous Infusions:   sodium chloride       PRN Meds:potassium chloride **OR** potassium alternative oral replacement **OR** potassium chloride, hydrALAZINE, albuterol sulfate HFA, HYDROcodone-acetaminophen, sodium chloride flush, sodium chloride, ondansetron **OR** [DISCONTINUED] ondansetron, polyethylene glycol, acetaminophen **OR** acetaminophen    Review of Systems:  Pertinent positives/negatives reviewed in HPI. All other systems reviewed and negative, unless noted below. Constitutional: Negative  Genitourinary: Negative  HEENT: Negative   Cardiovascular: Negative   Respiratory: Negative   Gastrointestinal: Negative   Musculoskeletal: Negative   Neurological: Negative   Psychiatric: Negative   Integumentary: Negative     PHYSICAL EXAM     Vitals:    09/12/22 0736   BP:    Pulse:    Resp: 18   Temp:    SpO2:      Constitutional: NAD, well-developed, well-nourished. HEENT: MMM. Hearing intact. Neck: no thyroid masses appreciated. Trachea is midline. Neck appears unremarkable.   Lymph: no palpable adenopathy in supraclavicular, or axillary lymph nodes. Cardiovascular: Regular rate. No peripheral edema. ABDOMEN: Abdomen soft, non-tender, non-distended. No enlarged liver or spleen. No hernias noted. Stool occult blood not indicated. Respiratory: Respirations are even and non-labored. No audible breath sounds. Genitourinary: no CVAT, pelvic deferred. Psychiatric: A + O x 3, normal affect. Insight appears intact. Muskuloskeletal: LISANDRA x 4  Skin: Pink, warm and dry. No rashes on face and arms. Intake/Output Summary (Last 24 hours) at 9/12/2022 0840  Last data filed at 9/11/2022 2325  Gross per 24 hour   Intake 440 ml   Output 700 ml   Net -260 ml       LABS     CBC:   Lab Results   Component Value Date/Time    WBC 8.9 09/12/2022 05:17 AM    RBC 5.08 09/12/2022 05:17 AM    HGB 13.6 09/12/2022 05:17 AM    HCT 43.4 09/12/2022 05:17 AM    MCV 85.4 09/12/2022 05:17 AM    MCH 26.8 09/12/2022 05:17 AM    MCHC 31.4 09/12/2022 05:17 AM    RDW 19.7 09/12/2022 05:17 AM     09/12/2022 05:17 AM    MPV 9.5 09/12/2022 05:17 AM     BMP:   Lab Results   Component Value Date/Time     09/12/2022 05:17 AM    K 3.7 09/12/2022 05:17 AM     09/12/2022 05:17 AM    CO2 23 09/12/2022 05:17 AM    BUN 28 09/12/2022 05:17 AM    CREATININE 1.3 09/12/2022 05:17 AM    GLUCOSE 94 09/12/2022 05:17 AM    GLUCOSE 80 06/08/2022 02:37 PM    CALCIUM 9.1 09/12/2022 05:17 AM     Urinalysis:   Lab Results   Component Value Date/Time    COLORU ORANGE 09/11/2022 12:31 PM    GLUCOSEU Negative 09/11/2022 12:31 PM    GLUCOSEU NEGATIVE 12/04/2011 06:40 PM    BLOODU LARGE 09/11/2022 12:31 PM    NITRU Negative 09/11/2022 12:31 PM    LEUKOCYTESUR MODERATE 09/11/2022 12:31 PM     Urine culture: No results for input(s): LABURIN in the last 72 hours.      IMAGING     XR CHEST (2 VW)    Result Date: 9/1/2022  EXAMINATION: TWO XRAY VIEWS OF THE CHEST 9/1/2022 7:11 am COMPARISON: 05/27/2022 HISTORY: ORDERING SYSTEM PROVIDED HISTORY: palpitations. no chest pain or fever or cough. longstanding dizziness TECHNOLOGIST PROVIDED HISTORY: Reason for exam:->palpitations. no chest pain or fever or cough. longstanding dizziness FINDINGS: Left-sided bipolar cardiac pacer is stable in configuration. The heart is enlarged with mild central pulmonary venous congestion there is no consolidation, pneumothorax or effusion. 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 Cardiomegaly with mild pulmonary venous congestion. XR HUMERUS LEFT (MIN 2 VIEWS)    Result Date: 8/20/2022  Site: [facility] #: [id number] #: [id number]: BTLREDAccount #: [de-identified] #: [id number] #: [id number]: XR HUMERUS LEFT AP LATERALExam Date/Time: 08/20/2022 08:35 PMAdmitting Diagnosis: PainReason for Exam: Pain Dictated by: Hardy Penaloza ANÍBAL: 08/20/2022 09:49 PMT: This document is confidential medical information. Unauthorized disclosure or use of this information is prohibited by law. If you are not the intended recipient of this document, please advise us by calling immediately [phone]. Impression/Conclusion below HISTORY:  Pain  COMPARISON: None available NOTE:  If there are questions about the content of this report, please contact [facility] radiology by calling [phone] FINDINGS:                 BONES:  Osteopenia without acute, displaced fracture. JOINTS:  Not optimally profiled, but no gross abnormality is seen. There is mild widening of the left acromioclavicular joint; this is unchanged from previous chest radiograph dated 4/22/2022, likely due to previous surgery. SOFT TISSUES:  Unremarkable OTHER:  None  IMPRESSION:  No significant acute abnormality. Osteopenia without acute, displaced fracture.   SIGNED BY: John Foreman MD on 8/20/2022  9:46 PM   [facility] [phone] -  [address] [facility]: [phone]       XR KNEE LEFT (3 VIEWS)    Result Date: 8/20/2022  Site: Impression/Conclusion below HISTORY:  Pain  fall  COMPARISON: None available NOTE:  If there are questions about the content of this report, please contact [facility] radiology by calling [phone] FINDINGS:                 BONES:  Osteopenia without acute, displaced fracture. JOINTS:  Not optimally profiled, but no gross abnormality is seen. SOFT TISSUES:  Surgical clips within the medial leg. OTHER:  None  IMPRESSION:  No significant acute abnormality. Osteopenia without acute, displaced fracture. SIGNED BY: Marcus Franklin MD on 8/20/2022  9:46 PM   [address] [facility] [phone] [facility] Call Center: [phone]       CT HEAD WO CONTRAST    Result Date: 8/20/2022  Site: [facility] #: [id number] #: [id number]: BTLREDAccount #: [de-identified] #: [id number] #: [id number]: CT HEAD WO CONTRASTExam Date/Time: 08/20/2022 08:55 PMAdmitting Diagnosis: Head trauma, mod-severeReason for Exam: Head trauma, mod-severe Dictated by: Sammie Larose ANÍBAL: 08/20/2022 08:59 PMT: This document is confidential medical information. Unauthorized disclosure or use of this information is prohibited by law. If you are not the intended recipient of this document, please advise us by calling immediately [phone]. Impression/Conclusion below HISTORY:   Head trauma, mod-severe s/p fall COMPARISON:  3/14/2022 TECHNIQUE:  Noncontrast multiplanar CT images of the head NOTE:  If there are questions about the content of this report, please contact [facility] radiology by calling [phone] FINDINGS: BRAIN PARENCHYMA: No acute intracranial hemorrhage. There is an area of encephalomalacia right  frontal lobe consistent with an old infarct. There is periventricular low-attenuation consistent with chronic small vessel ischemic change. There is an old lacunar infarct left coronary head. There is an old lacunar infarct right basal ganglia. VENTRICLES/SULCI: Unremarkable.   No hydrocephalus or midline shift EXTRA-AXIAL SPACES:  Unremarkable PARANASAL SINUSES/MASTOIDS: Unremarkable BONES:  Unremarkable OTHER: None IMPRESSION: 1. Chronic changes as discussed above. 2.  No acute intracranial hemorrhage. SIGNED BY: Victoria Rivas MD on 8/20/2022  8:56 PM   [address] [facility] [phone] -  [facility] Call Center: [phone]       XR CHEST PORTABLE    Result Date: 9/10/2022  EXAMINATION: ONE XRAY VIEW OF THE CHEST 9/10/2022 1:27 pm COMPARISON: 09/01/2022 HISTORY: ORDERING SYSTEM PROVIDED HISTORY: sob TECHNOLOGIST PROVIDED HISTORY: Reason for exam:->sob Reason for Exam: Atrial Fibrillation (Sent by dr Tamara Luque to have angiogram on monday) FINDINGS: Cardial pericardial silhouette is enlarged, similar when compared to the previous exam.  Pacemaker again noted. The pulmonary vasculature is mildly congested. No acute infiltrate is seen. Chronic scarring and blunting of the left costophrenic angle is unchanged. No pneumothorax is found. No free air. No acute bony abnormality. Mild pulmonary vascular congestion. Stable cardial pericardial silhouette enlargement. Otherwise unremarkable portable chest radiograph. XR KNEE RIGHT AP LAT SUNRISE    Result Date: 8/20/2022  Site: [facility] #: [id number] #: [id number]: BTLREDAccount #: [de-identified] #: [id number] #: [id number]: XR KNEE RIGHT AP LATERAL AND SUNRISEExam Date/Time: 08/20/2022 08:50 PMAdmitting Diagnosis: PainReason for Exam: Pain Dictated by: [place]ella Rock ANÍBAL: 08/20/2022 09:50 PMT: This document is confidential medical information. Unauthorized disclosure or use of this information is prohibited by law. If you are not the intended recipient of this document, please advise us by calling immediately [phone].  Impression/Conclusion below HISTORY:  Pain  fall COMPARISON: None available NOTE:  If there are questions about the content of this report, please contact [facility] radiology by calling [phone] FINDINGS:                 BONES:  Osteopenia without acute, displaced fracture. JOINTS:  Mild medial compartment osteoarthritis. SOFT TISSUES:  Atherosclerotic vascular calcification. OTHER:  None  IMPRESSION:  No significant acute abnormality. Osteopenia without acute, displaced fracture. Mild medial compartment osteoarthritis. SIGNED BY: John Foreman MD on 8/20/2022  9:47 PM   [address] [facility] [phone] -  2011 [facility] Street: [phone]      NM Cardiac Stress Test Nuclear Imaging    Result Date: 8/30/2022  Cardiac Perfusion Imaging  Demographics   Patient Name       Jenifer Avila   Date of Study      08/30/2022         Gender              Female   Patient Number     [id number]         Date of Birth       1940   Visit Number       [id number]          Age                 80 year(s)   Accession Number   [id number]         Room Number         OP   Corporate ID       [id number]           NM Technician       Maricruz Hennessy   Nurse              Reina Cosby, LAQUITA Interpreting        Oswaldo Tapia MD,                                        Physician           [facility]   Ordering Physician Denae Rogel., CNS   The procedure was explained in detail to the patient. Risks,  complications and alternative treatments were reviewed. Written consent  was obtained. Procedure Procedure Type:   Nuclear Stress Test:Pharmacological, NM MYOCARDIAL SPECT REST EXERCISE OR  RX   Study location: [facility] - Nuclear Medicine   Indications: Shortness of breath and Lightheadedness. Hospital Status: Outpatient.   Height: 63 inches Weight: 119 pounds  Risk Factors   The patient risk factors include:prior PCI on 11/04/2014;prior CABG on  01/01/1987;cerebrovascular disease, former tobacco use, treated and  controlled hypercholesterolemia, treated and controlled hypertension, family  history of premature CAD, dyslipidemia, prior heart failure and ( years not  smokin). Conclusions   Summary  Small-medium sized anterolateral partial reversibility defect of moderate  intensity consistent with ischemia and infarction in the territory of the  mid and distal LAD and/or LCx . Mild global hypokinesis with EF 44%  Stress Protocols   Resting ECG  Normal sinus rhythm. Diffuse nonspecific ST-T changes   Resting HR:70 bpm  Resting BP:132/77 mmHg  Stress Protocol:Pharmacologic - Lexiscan's  Peak HR:70 bpm                           HR/BP product:8820  Peak BP:126/53 mmHg  Predicted HR: 139 bpm  % of predicted HR: 50  Test duration: 4 min  Reason for termination:Completed   ECG Findings  Normal response to lexiscan . Symptoms  Developed shortness of breath, lightheadedness, dizziness,  headache and nausea symptoms likely related to lexiscan. Symptoms  resolved with aminophylline. Patient denies any chest pain and/or  discomfort. Complications  Procedure complication was none. Stress Interpretation  ECG portion of stress test is non-diagnostic due to paced rhythm. Procedure Medications   - Lexiscan I.V. 0.4 mg.10 sec  Imaging Protocols   - One Day   Rest                          Stress   Isotope:Myoview/Tetrofosmin   Isotope: Myoview/Tetrofosmin  Isotope dose:10.6 mCi         Isotope dose:29.5 mCi  Administration Route:I.V. Administration Route:I.V.  Date:2022 08:55         Date:2022 10:55                                 Technique:      Gated  Imaging Results    Stress ejection    Ejection fraction:44 %    EDV :167 ml    ESV :93 ml    Stroke volume :74 ml    LV mass :180 gr  Medical History   Additional Medical History   Outpatient Medications  torsemide (DEMADEX) 10 MG tablet 20 mg everyday  Patient taking differently: Take 20 mg by mouth in the morning.   8/3/22 Yes Tory Reyez APRN - CNS  vitamin D (ERGOCALCIFEROL) 1.25 MG (10507 UT) CAPS capsule TAKE  1 CAPSULE ONCE A WEEK 8/3/22 Yes Les Moores MD Brea  aspirin EC 81 MG EC tablet Take 1 tablet by mouth in the  morning. 7/20/22 Yes Librado Teresa MD  clopidogrel (PLAVIX) 75 MG tablet Take 1 tablet by mouth in the  morning. 7/20/22 Yes Librado Teresa MD  allopurinol (ZYLOPRIM) 100 MG tablet TAKE 1 TABLET EVERY DAY  7/13/22 Yes Nacho Orellana MD  levothyroxine (SYNTHROID) 125 MCG tablet Take 1 tablet by mouth  Daily 6/28/22 Yes Nacho Orellana MD  topiramate (TOPAMAX) 50 MG tablet TAKE 2 TABLETS TWICE DAILY  6/16/22 Yes Nacho Orellana MD  nitroGLYCERIN (NITROLINGUAL) 0.4 MG/SPRAY 0.4 mg spray Place 1  spray under the tongue every 5 minutes as needed for Chest pain  2/8/22 Yes Nacho Orellana MD  fluticasone [facility]) 50 MCG/ACT nasal spray 2 sprays by Each  Nostril route daily 1/25/22 Yes TIFFANIE Cuello CNP  metoprolol succinate (TOPROL XL) 25 MG extended release tablet  Take 0.5 tablets by mouth daily . 5 tablet daily 12/6/21 Yes  TIFFANIE Cortez - CNP   History of Present Illness: 80 y.o. female with history of Ms. Edna Zavala She has a history of atrial fibrillation (on xarelto),  TIA, chronic kidney disease, CHF, hypertension, hyperlipidemia,  mitral valve disease, hypothyroidism. Her last LVEF was 45% on  7/19/19.  CABG, 7/08 cath- patent LIMA to LAD, SVG occluded to  RCA; 8/2019 left to right fem-fem bypass and left fem to above  knee popliteal bypass  Pacemaker generator change 1/25/21  Watchman implanted 5/17/2021  Past Medical History: has a past medical history of Allergic  rhinitis, cause unspecified, Arthritis, Atrial fibrillation,  Bronchopneumonia, CAD (coronary artery disease), Cerebral artery  occlusion with cerebral infarction, Chronic gouty arthropathy,  Chronic kidney disease, Congestive heart failure, unspecified,  Degeneration of cervical intervertebral disc, Essential and  other specified forms of tremor, Gout, HIGH CHOLESTEROL, History  of CVA (cerebrovascular accident), Hx of blood clots,  Hypertension, Influenza, Leakage of Watchman left atrial  appendage closure device, Mitral valve stenosis and aortic valve  insufficiency, Movement disorder, Pacemaker, Peptic ulcer,  unspecified site, unspecified as acute or chronic, without  mention of hemorrhage, perforation, or obstruction, Thyroid  disease, Unspecified disorder of kidney and ureter, and  Unspecified hypothyroidism. Surgical History: has a past surgical history that includes back  surgery; Cholecystectomy; Cardiac surgery; Coronary artery  bypass graft (1987); shoulder surgery; Cardiac catheterization  (7/2012); hip surgery (Left, 03/16/2017); joint replacement;  Cardiac catheterization (08/07/2018); Percutaneous Transluminal  Coronary Angio (11/04/2014); pr njx aa&/strd tfrml epi  lumbar/sacral 1 level (Right, 12/3/2018); Femoral-femoral Bypass  Graft (N/A, 8/22/2019); femoral bypass (Left, 8/22/2019); and IR  KYPHOPLASTY LUMBAR 1 VERTEBRAL BODY (5/14/2021). Signatures   ------------------------------------------------------------------  Electronically signed by Abundio Felty MD, [facility] [place] (Interpreting  physician) on 08/30/2022 at 12:31  ------------------------------------------------------------------      XR HIP 2-3 VW W PELVIS RIGHT    Result Date: 9/6/2022  Radiology exam is complete. No Radiologist dictation. Please follow up with ordering provider.             Electronically signed by: TIFFANIE Shaikh CNP, 9/12/2022   [facility]  Office contact: [phone]

## 2022-09-12 NOTE — PROGRESS NOTES
Pt brought to room 3328 after cath lab. Had LHC & RHC. L radial site as well as R groin, venous site. Cath lab RN at bedside performing handoff and site assessments w/ this RN:  - L radial site w/ +3 radial pulse both proximal & distal, bruising to area but this was present pre-procedure. L hand/fingers purple in color, cath lab RN removed 1 cc air from TR band and color slightly improved. Great pleth to pulse ox L index finger, spO2 100%. Full sensation, no pain  - R groin site w/ quick clot & transparent dressing, no oozing/bruising, site soft and distal pulse +2    Pt remains supine at this time. Q15 minute vitals initiated per protocol. 9 cc air remains in band.  Pt & family at bedside oriented to POC, fall precautions, and call light use

## 2022-09-13 LAB
ANION GAP SERPL CALCULATED.3IONS-SCNC: 13 MMOL/L (ref 3–16)
BASOPHILS ABSOLUTE: 0.1 K/UL (ref 0–0.2)
BASOPHILS RELATIVE PERCENT: 1.4 %
BUN BLDV-MCNC: 30 MG/DL (ref 7–20)
CALCIUM SERPL-MCNC: 8.6 MG/DL (ref 8.3–10.6)
CHLORIDE BLD-SCNC: 109 MMOL/L (ref 99–110)
CO2: 18 MMOL/L (ref 21–32)
CREAT SERPL-MCNC: 1.2 MG/DL (ref 0.6–1.2)
EOSINOPHILS ABSOLUTE: 0.4 K/UL (ref 0–0.6)
EOSINOPHILS RELATIVE PERCENT: 4.8 %
GFR AFRICAN AMERICAN: 52
GFR NON-AFRICAN AMERICAN: 43
GLUCOSE BLD-MCNC: 90 MG/DL (ref 70–99)
HCT VFR BLD CALC: 41.8 % (ref 36–48)
HEMOGLOBIN: 12.9 G/DL (ref 12–16)
LYMPHOCYTES ABSOLUTE: 1.1 K/UL (ref 1–5.1)
LYMPHOCYTES RELATIVE PERCENT: 12.4 %
MCH RBC QN AUTO: 26.7 PG (ref 26–34)
MCHC RBC AUTO-ENTMCNC: 31 G/DL (ref 31–36)
MCV RBC AUTO: 86.2 FL (ref 80–100)
MONOCYTES ABSOLUTE: 0.5 K/UL (ref 0–1.3)
MONOCYTES RELATIVE PERCENT: 6.1 %
NEUTROPHILS ABSOLUTE: 6.4 K/UL (ref 1.7–7.7)
NEUTROPHILS RELATIVE PERCENT: 75.3 %
PDW BLD-RTO: 20.2 % (ref 12.4–15.4)
PLATELET # BLD: 310 K/UL (ref 135–450)
PMV BLD AUTO: 9.3 FL (ref 5–10.5)
POTASSIUM REFLEX MAGNESIUM: 4.1 MMOL/L (ref 3.5–5.1)
RBC # BLD: 4.84 M/UL (ref 4–5.2)
SODIUM BLD-SCNC: 140 MMOL/L (ref 136–145)
TROPONIN: <0.01 NG/ML
WBC # BLD: 8.5 K/UL (ref 4–11)

## 2022-09-13 PROCEDURE — 84484 ASSAY OF TROPONIN QUANT: CPT

## 2022-09-13 PROCEDURE — 6370000000 HC RX 637 (ALT 250 FOR IP): Performed by: INTERNAL MEDICINE

## 2022-09-13 PROCEDURE — 80048 BASIC METABOLIC PNL TOTAL CA: CPT

## 2022-09-13 PROCEDURE — 94680 O2 UPTK RST&XERS DIR SIMPLE: CPT

## 2022-09-13 PROCEDURE — 93005 ELECTROCARDIOGRAM TRACING: CPT | Performed by: INTERNAL MEDICINE

## 2022-09-13 PROCEDURE — 6360000002 HC RX W HCPCS: Performed by: INTERNAL MEDICINE

## 2022-09-13 PROCEDURE — 51798 US URINE CAPACITY MEASURE: CPT

## 2022-09-13 PROCEDURE — 99233 SBSQ HOSP IP/OBS HIGH 50: CPT | Performed by: INTERNAL MEDICINE

## 2022-09-13 PROCEDURE — 2060000000 HC ICU INTERMEDIATE R&B

## 2022-09-13 PROCEDURE — 85025 COMPLETE CBC W/AUTO DIFF WBC: CPT

## 2022-09-13 PROCEDURE — 36415 COLL VENOUS BLD VENIPUNCTURE: CPT

## 2022-09-13 PROCEDURE — 2580000003 HC RX 258: Performed by: INTERNAL MEDICINE

## 2022-09-13 RX ORDER — SODIUM CHLORIDE 0.9 % (FLUSH) 0.9 %
5-40 SYRINGE (ML) INJECTION PRN
Status: DISCONTINUED | OUTPATIENT
Start: 2022-09-13 | End: 2022-09-16 | Stop reason: HOSPADM

## 2022-09-13 RX ADMIN — HYDROCODONE BITARTRATE AND ACETAMINOPHEN 1 TABLET: 5; 325 TABLET ORAL at 21:28

## 2022-09-13 RX ADMIN — Medication 1000 MG: at 18:18

## 2022-09-13 RX ADMIN — METOPROLOL SUCCINATE 12.5 MG: 25 TABLET, EXTENDED RELEASE ORAL at 09:05

## 2022-09-13 RX ADMIN — Medication 10 ML: at 09:06

## 2022-09-13 RX ADMIN — Medication 10 ML: at 21:29

## 2022-09-13 RX ADMIN — HYDRALAZINE HYDROCHLORIDE 10 MG: 20 INJECTION INTRAMUSCULAR; INTRAVENOUS at 16:39

## 2022-09-13 RX ADMIN — ASPIRIN 81 MG: 81 TABLET, COATED ORAL at 09:06

## 2022-09-13 RX ADMIN — ENOXAPARIN SODIUM 30 MG: 100 INJECTION SUBCUTANEOUS at 09:06

## 2022-09-13 RX ADMIN — OXYCODONE HYDROCHLORIDE AND ACETAMINOPHEN 500 MG: 500 TABLET ORAL at 09:05

## 2022-09-13 RX ADMIN — CLOPIDOGREL BISULFATE 75 MG: 75 TABLET ORAL at 09:06

## 2022-09-13 RX ADMIN — ACETYLCYSTEINE 600 MG: 200 SOLUTION ORAL; RESPIRATORY (INHALATION) at 12:01

## 2022-09-13 RX ADMIN — OXYCODONE HYDROCHLORIDE AND ACETAMINOPHEN 500 MG: 500 TABLET ORAL at 21:29

## 2022-09-13 RX ADMIN — LEVOTHYROXINE SODIUM 125 MCG: 0.12 TABLET ORAL at 07:04

## 2022-09-13 RX ADMIN — OXYCODONE HYDROCHLORIDE AND ACETAMINOPHEN 500 MG: 500 TABLET ORAL at 16:03

## 2022-09-13 ASSESSMENT — PAIN DESCRIPTION - DESCRIPTORS: DESCRIPTORS: SQUEEZING

## 2022-09-13 ASSESSMENT — PAIN SCALES - GENERAL: PAINLEVEL_OUTOF10: 8

## 2022-09-13 ASSESSMENT — PAIN DESCRIPTION - ORIENTATION: ORIENTATION: MID

## 2022-09-13 ASSESSMENT — PAIN DESCRIPTION - LOCATION: LOCATION: CHEST

## 2022-09-13 NOTE — PROGRESS NOTES
Shift Events    - Ambulation encouraged - pt up to bathroom several times with RN MARVEL    - HTN @ 1630; 177/67, prn hydralazine given, B/P responded     - Pt developed sudden onset CP @ 1630 that subsequently resolved, trop neg & ECG done    - Pt struggling with measuring output - keeps missing collection hat

## 2022-09-13 NOTE — PROGRESS NOTES
RN & charge RN at bedside. Pt c/o squeezing/tight CP that woke her from sleep. Stat EKG & trop ordered. Vitals done - HTN. Prn hydralazine given. No nitro in MAR. Cards called - Dr. Светлана Krause in procedure, advised hospitalist to come see. Attending called.

## 2022-09-13 NOTE — PROGRESS NOTES
HOSPITALISTS PROGRESS NOTE    9/13/2022 8:40 AM        Name: Amanda Camarillo . Admitted: 9/10/2022  Primary Care Provider: Bhakti Welsh MD (Tel: [phone])      Brief Course: This 70-year-old female with PMHx of chronic A. Fib s/p watchman procedure, CAD, hypertension, hyperlipidemia, CKD stage II presented with worsening exertional dyspnea from past 1 week and yesterday started to have shortness of breath upon lying down flat. Of note, patient underwent stress test around end of August which was positive and patient was supposed to have cath on Monday. Interval history:   Pt seen and examined today. Overnight events noted, interval ancillary notes and labs reviewed. On room air satting well, afebrile, WBCs WNL, cultures negative to date  Creatinine trended down to 1.2   Reported shortness of breath and left-sided abdominal pain but denied any fever, chills cough,chest pain, nausea, vomiting or abdominal pain        Assessment & Plan:     Hx of CAD & PCI to LAD. 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 Plan for CT chest with contrast tomorrow to evaluate proximity of circumflex to watchman. If watchman not in contact with circumflex artery 10 plan for PCI of circumflex. Continue aspirin, Plavix and statins    Acute on on chronic diastolic CHF appears compensated  CXR showed mild pulmonary vascular congestion elevated proBNP on admission: Status post IV Lasix   Last echo on 7/20/22 showed EF of 55 to 60%, moderate mitral & tricuspid regurg and mild pulmonary hypertension  Monitor on telemetry. Supplemental oxygen if needed to keep sats     Exertional dyspnea likely secondary to above    Hx of chronic A. fib: s/p watchmans.   Continue metoprolol    UTI: UA suggestive of UTI; patient asymptomatic & urine culture showed no growth to date  Antibiotic discontinued    Hematuria: CT abdomen and pelvis showed few subcentimeter hypodense nodule in right and left kidney suggestive of hemorrhagic or proteinaceous cyst  Urology on board; plan for outpatient work-up of hematuria with CT/cystoscopy cytology    Chronic left flank pain likely secondary to nonobstructing stones.   Urology following plan for outpatient work-up  Continue as needed pain meds    Hypothyroidism: Continue Synthroid    CKD stage III: Creatinine 1.2 on admission; trended up to 1.3  Nephrology consulted: Appreciate their recs  Avoid nephrotoxin, strict intake output, daily weights and monitor renal function closely        DVT prophylaxis: lovenox  Code:Full Code    Disposition: Once acute medical issues have resolved    Current Medications  acetylcysteine (MUCOMYST) 20 % solution 600 mg, BID  ascorbic acid (VITAMIN C) tablet 500 mg, TID  ziprasidone (GEODON) injection 20 mg, Once  potassium chloride (KLOR-CON M) extended release tablet 40 mEq, PRN   Or  potassium bicarb-citric acid (EFFER-K) effervescent tablet 40 mEq, PRN   Or  potassium chloride 10 mEq/100 mL IVPB (Peripheral Line), PRN  hydrALAZINE (APRESOLINE) injection 10 mg, Q6H PRN  cefTRIAXone (ROCEPHIN) 1000 mg in sterile water 10 mL IV syringe, Q24H  albuterol sulfate HFA (PROVENTIL;VENTOLIN;PROAIR) 108 (90 Base) MCG/ACT inhaler 2 puff, 4x Daily PRN  aspirin EC tablet 81 mg, Daily  clopidogrel (PLAVIX) tablet 75 mg, Daily  fluticasone (FLONASE) 50 MCG/ACT nasal spray 2 spray, Daily  levothyroxine (SYNTHROID) tablet 125 mcg, Daily  HYDROcodone-acetaminophen (NORCO) 5-325 MG per tablet 1 tablet, 4x Daily PRN  metoprolol succinate (TOPROL XL) extended release tablet 12.5 mg, Daily  sodium chloride flush 0.9 % injection 5-40 mL, 2 times per day  sodium chloride flush 0.9 % injection 5-40 mL, PRN  0.9 % sodium chloride infusion, PRN  enoxaparin Sodium (LOVENOX) injection 30 mg, Daily  ondansetron (ZOFRAN-ODT) disintegrating tablet 4 mg, Q8H PRN  polyethylene glycol (GLYCOLAX) packet 17 g, Daily PRN  acetaminophen (TYLENOL) tablet 650 mg, Q6H PRN   Or  acetaminophen (TYLENOL) suppository 650 mg, Q6H PRN      Objective:  /73   Pulse 75   Temp 97.7 °F (36.5 °C) (Oral)   Resp 17   Ht 5' 3\" (1.6 m)   Wt 113 lb (51.3 kg)   SpO2 95%   BMI 20.02 kg/m²     Intake/Output Summary (Last 24 hours) at 9/13/2022 0840  Last data filed at 9/12/2022 2015  Gross per 24 hour   Intake 240 ml   Output 13 ml   Net 227 ml        Wt Readings from Last 3 Encounters:   09/13/22 113 lb (51.3 kg)   09/06/22 120 lb (54.4 kg)   09/01/22 120 lb (54.4 kg)       Physical Examination:   General appearance:  No apparent distress, appears stated age and cooperative. HEENT: Normocephalic, sclera clear., PERRLA. Trachea midline, no adenopathy. Cardiovascular: Regular rate and rhythm, normal S1, S2. No murmur. Respiratory:Clear to auscultation bilaterally, no wheeze or crackles. GI: Abdomen soft, no tenderness, not distended, normal bowel sounds  Musculoskeletal: No cyanosis in digits. No BLE edema present  Neurology: CN 2-12 grossly intact. No speech or motor deficits  Psych: Not agitated, appropriate affect  Skin: Warm, dry, normal turgor    Labs and Tests:  CBC:   Recent Labs     09/11/22  0718 09/12/22  0517 09/13/22  0458   WBC 15.2* 8.9 8.5   HGB 13.7 13.6 12.9    334 310       BMP:    Recent Labs     09/11/22  0718 09/12/22  0517 09/13/22  0458    142 140   K 3.4* 3.7 4.1    109 109   CO2 18* 23 18*   BUN 25* 28* 30*   CREATININE 1.1 1.3* 1.2   GLUCOSE 138* 94 90       Hepatic:   Recent Labs     09/10/22  1634 09/11/22  0212   AST 31 19   ALT 9* 9*   BILITOT 0.3 0.6   ALKPHOS 91 110       CT ABDOMEN PELVIS WO CONTRAST Additional Contrast? None   Final Result   No hydronephrosis noted.   A few subcentimeter hyperdense nodules are seen in   the right and left kidney, incompletely characterized on noncontrast study, most likely hemorrhagic or proteinaceous cyst.  Simple appearing cysts are   seen in the right and left kidney      A few small calcifications are seen in the right and left kidney, likely a   combination of nonobstructing renal calculi or vascular calcifications. No obvious bladder calculi. No significant inflammatory stranding   surrounding the bladder. Tiny pleural effusions, suggesting mild fluid overload         XR CHEST PORTABLE   Final Result   Mild pulmonary vascular congestion. Stable cardial pericardial silhouette   enlargement. Otherwise unremarkable portable chest radiograph. Problem List  Principal Problem:    SCHMID (dyspnea on exertion)  Resolved Problems:    * No resolved hospital problems.  Cecelia Ramos MD   9/13/2022 8:40 AM

## 2022-09-13 NOTE — PROGRESS NOTES
09/13/22 1301   Resting (Room Air)   SpO2 99   HR 77   During Walk (Room Air)   SpO2 99   HR 99   Walk/Assistance Device Walker   Rate of Dyspnea 1   Symptoms Shortness of breath   After Walk   SpO2 99   HR 88   Rate of Dyspnea 1   Does the Patient Qualify for Home O2 No   Does the Patient Need Portable Oxygen Tanks No     Electronically signed by Belkis Patel RCP on 9/13/2022 at 1:02 PM

## 2022-09-13 NOTE — CARE COORDINATION
Discharge Planning Assessment  Readmission score 16%  RN discharge planner met with patient/ and family member-daughter to discuss reason for admission, current living situation, and potential needs at the time of discharge    Demographics/Insurance verified Yes- Medicare    Current type of dwelling: one level home     Patient from ECF/SW confirmed with:  N/A    Living arrangements:  her grandson lives with her    Level of function/Support: Independent with ADLs, but has been having frequent falls. PCP: Dr Brian Adames      Last Visit to PCP: 2 weeks ago     DME: Yeni owusu , Life alert button- neckless    Active with any community resources/agencies/skilled home care:  Has had hhc before, cannot recall the agency. Medication compliance issues: Daughter organizes. Financial issues that could impact healthcare: No      Tentative discharge plan: home with possible skilled hhc. PT&OT evaluations pending.     Discussed and provided facilities of choice if transition to a skilled nursing facility is required at the time of discharge- No    Transportation at the time of discharge:  daughter will assist.

## 2022-09-13 NOTE — PROGRESS NOTES
Urology Progress Note  [facility]    Provider: TIFFANIE Montana CNP  Patient ID:  Admission Date: 9/10/2022 Name: Rl Flores Date: 9/10/2022 MRN: [id number]   Patient Location: [id number] : 1940  Attending: Wicho Silva MD Date of Service: 2022  PCP: Ayan Ahn MD     Diagnoses:  1. SCHMID (dyspnea on exertion)     Gross Hematuria    Assessment/Plan:  No acute events overnight  CT a/p WO shows bl small hyperdense cysts  Likely bl nephrolithiasis  Cr 1.2    Recommendations:  Need full hematuria workup with CT/Cysto/Cytology  Chronic left flank pain pay be related to intermittently obstructing stone, ongoing for x1 yr  Needs outpatient cystoscopy left RPG possible URS for full hematuria workup. Follow up x2-3 weeks requested      The patient had a chance to ask questions which were answered. she understands the above plan. Subjective:   Abbe Kawasaki is a 80 y.o. female. She was seen and examined this morning. Today we discussed flank pain and hematuyria f/u. Objective:   Vitals:  Vitals:    22 0526   BP: 125/73   Pulse: 75   Resp: 17   Temp: 97.7 °F (36.5 °C)   SpO2: 95%       Intake/Output Summary (Last 24 hours) at 2022 0853  Last data filed at 2022  Gross per 24 hour   Intake 240 ml   Output 13 ml   Net 227 ml     Physical Exam:  Gen: Alert and oriented x3, no acute distress  CV: Regular rate   Resp: unlabored respirations  Abd: Soft, non-distended, non-tender, no masses  Ext: no peripheral edema noted, moves upper and lower extremities spontaneously  Skin: warmand well perfused, no rashes noted on the face, or arms.      Labs:  Lab Results   Component Value Date    WBC 8.5 2022    HGB 12.9 2022    HCT 41.8 2022    MCV 86.2 2022     2022     Lab Results   Component Value Date    CREATININE 1.2 2022    BUN 30 (H) 2022     2022    K 4.1 2022     2022 CO2 18 (L) 09/13/2022       TIFFANIE Gates - CNP   9/13/2022

## 2022-09-13 NOTE — PROGRESS NOTES
[address]  Daily Progress Note    Admit 9/10/2022   CC CAD, AF   Subjective Doing well today. No cp. Sob mild. Exam BP (!) 138/57   Pulse 70   Temp 98.1 °F (36.7 °C) (Oral)   Resp 16   Ht 5' 3\" (1.6 m)   Wt 113 lb (51.3 kg)   SpO2 96%   BMI 20.02 kg/m²    Intake/Output Summary (Last 24 hours) at 9/13/2022 1339  Last data filed at 9/13/2022 0932  Gross per 24 hour   Intake 720 ml   Output --   Net 720 ml     Gen Alert, coop, no distress Heart  Rrr, 1/6   Head NC, AT, no abnorm Abd  Soft, NT, +BS, no mass, no OM   Eyes PER, conj/corn clear Ext  Ext nl, AT, no C/C/E   Nose Nares nl, no drain, NT Pulse 2+ and symmetric   Throat Lips, mucosa, tongue nl Skin Col/text/turg nl, no vis rash/les   Neck S/S, TM, NT, no bruit/JVD Psych Nl mood and affect   Lung CTA-B, unlabored, no DTP Lymph   No cervical or axillary LA   Ch wall NT, no deform Neuro  Nl gross M/S exam      Medications  acetylcysteine  600 mg Oral BID    ascorbic acid  500 mg Oral TID    ziprasidone  20 mg IntraMUSCular Once    cefTRIAXone (ROCEPHIN) IV  1,000 mg IntraVENous Q24H    aspirin EC  81 mg Oral Daily    clopidogrel  75 mg Oral Daily    fluticasone  2 spray Each Nostril Daily    levothyroxine  125 mcg Oral Daily    metoprolol succinate  12.5 mg Oral Daily    sodium chloride flush  5-40 mL IntraVENous 2 times per day    enoxaparin  30 mg SubCUTAneous Daily      sodium chloride        Labs Relevant and available CV data reviewed   Telemetry Reviewed   Time More than 35 minutes spent reviewing patient chart (including but not limited to notes, labs, imaging and other testing), interviewing patient and/or family members, performing a physical exam, documentation of my findings above and subsequent follow-up of ordered testing. More than 50% of that time spent face to face with patient discussing clinical condition and counseling regarding treatment plan.        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

## 2022-09-13 NOTE — PROGRESS NOTES
Nutrition Note    RECOMMENDATIONS  PO Diet: DARA as tolerated  ONS: pt declined     NUTRITION ASSESSMENT   Pt at risk for nutrition compromise AEB report of poor appetite & wt loss. Pt reports appetite has been poor for a while now, but states is very hungry this am & looking forward to eating bkf. Pt states UBW= 117 lb, indicating 4 lb wt loss over past several weeks (3%). Wt change may also be related to fluid overload. Pt reported compliance with CHF guidelines & denied need for education. Offered nutrition supplements, but declined since she was feeling good this am & ready to eat. Will monitor for improved po intake & further wt changes. Nutrition Related Findings: No edema noted; LBM 9/10  Wounds: None  Nutrition Education:  Education declined   Nutrition Goals: PO intake 50% or greater     MALNUTRITION ASSESSMENT   Chronic Illness  Malnutrition Status:  At risk for malnutrition (Comment)  Findings of the 6 clinical characteristics of malnutrition:  Energy Intake:  Mild decrease in energy intake (Comment)  Weight Loss:  Mild weight loss (specify amount and time period)     Body Fat Loss:  Mild body fat loss Buccal region   Muscle Mass Loss:  Mild muscle mass loss Temples (temporalis), Clavicles (pectoralis & deltoids)  Fluid Accumulation:  No significant fluid accumulation     Strength:  Not Performed      NUTRITION DIAGNOSIS   Inadequate oral intake related to inadequate protein-energy intake as evidenced by intake 0-25%, poor intake prior to admission      CURRENT NUTRITION THERAPIES  Diet NPO Exceptions are: Sips of Water with Meds     PO Intake: 0%, 1-25% (recorded yesterday)   PO Supplement Intake:None Ordered    ANTHROPOMETRICS  Current Height: 5' 3\" (160 cm)  Current Weight: 113 lb (51.3 kg)    Ideal Body Weight (IBW): 115 lbs  (52 kg)    Usual Bodyweight 117 lb (53.1 kg)       BMI: 20      COMPARATIVE STANDARDS  Energy (kcal):  1275- 1530     Protein (g):  61- 77g       Fluid (mL/day):  (64 oz /day per CHF guidelines)    The patient will be monitored per nutrition standards of care. Consult dietitian if additional nutrition interventions are needed prior to RD reassessment.      Casey Cruz RD, LD    Contact: [phone]

## 2022-09-13 NOTE — PROGRESS NOTES
Physician Progress Note      Amy Lopes  CSN #:                  [id number]  :                       1940  ADMIT DATE:       9/10/2022 3:53 PM  [address] [place] DATE:  Francesca Nicole  PROVIDER #:        No Little MD          QUERY TEXT:    Pt admitted with CHF. Pt noted to have confusion per nursing PN  at 0205   following episode of SOB and was given Valium followed by Geodon. If possible,   please document in the progress notes and discharge summary if you are   evaluating and / or treating any of the following: The medical record reflects the following:  Risk Factors: CHF, CAD, AF, CKD  Clinical Indicators: Per nursing PN  at 0205-\" Pt very anxious on   assessment and confused. Kostas MCGUIRE notified. Valium given for anxiety. ABG   and labs ordered. Pt less anxious\"  Per night time provider  at 0240-\"Contacted tonight by patient's nurse for   patient complaint of SOB. Patient hyperventilating with RR 35. Other VS   stable. Patient newly confused. Valium 5 mg IV ordered. Patient briefly   calmed allowing labs to be obtained. ABG, CMP, Mg, CBC, Ammonia, TSH ordered. ABG consistent with respiratory alkalosis. However, patient became   increasingly agitated about an hour after valium administered. Patient still   with a RR of 35. Other VS remain stable. Geodon 20 mg IM x 1 ordered. Labs   still pending. Handoff report given to attending  Per cardiology consult note  at 1558-\"apparently also got confused in the   hospital....during the night she became somewhat agitated, but that seems to   resolve currently. \"   labs- ABG-pH 7.475, pCO2 25.9, pO2 94.3, HCO3 19, BE -2.9, WBC 13.2,   ammonia and TSH- normal, Urine cx-pending for ? UTI  Treatment: Valium, Geodon, labs, supportive nursing care/safety measures    Thank you,  [email or web address]  Options provided:  -- Metabolic encephalopathy d/t, please specify.   -- Toxic encephalopathy  -- Delirium due to, Please specify cause.   -- Other - I will add my own diagnosis  -- Disagree - Not applicable / Not valid  -- Disagree - Clinically unable to determine / Unknown  -- Refer to Clinical Documentation Reviewer    PROVIDER RESPONSE TEXT:    Patient has delirium due to Hospitalization    Query created by: Long Jiang on 9/13/2022 12:13 PM      Electronically signed by:  Abby Lovett MD 9/13/2022 2:38 PM

## 2022-09-13 NOTE — PROGRESS NOTES
Charge RN to room. Pt complaining of chest pain. Stat ekg and trop ordered. Vital signs taken. BP elevated. No nitro orders in place. Cardiology called.

## 2022-09-13 NOTE — PROGRESS NOTES
MD Horacio Chicas MD Leotis Manes, MD               Office: [phone]                      Fax: [phone]             [facility]                   NEPHROLOGY INPATIENT PROGRESS NOTE:     PATIENT NAME: Peyton Avalos  : 1940  MRN: [id number]      RECOMMENDATIONS:   -no signs of CI-GIANA after [facility]   -Hold further diuresis - as still plan for CTA on  ,  -Given IV Lasix with good response  -Was on torsemide 10/20-alternative days - can resume on dc  -Was on Aldactone,    -Urology follow-up for gross hematuria, plans for CT/cysto/cyto = as outpt   -Follow-up urine culture  - at higher risk for decompensation, needing closer monitoring. D/C plan from renal stand point: stable   -Follows with me, next appointment on 2022    Discussed with patient, her daughter, team-hospitalist Dr Justin Gandara:       Admitted for:  SCHMID (dyspnea on exertion) [R06.00]  Exertional dyspnea [R06.00]      Non-proteinuric CKD: 3B   - BL Scr-lowest recently about 1.2-1.3, same on admission-   -Thought to be due to hypertension, cardiorenal physiology  -Follows with me in the office      Associated problems:   Acute on chronic-systolic heart failure-  CXR - Cardiomegaly with mild pulmonary venous congestion. Weight in past around 116-119 pounds      S/P [facility] 22 Dr Naoma Mortimer   IV contrast 61 cc  No LVEDP check       - Volume status: hyper-volemic  : BP: no need for tight control   : Na: controlled    - Azotemia: Prerenal  - Electrolytes: K: Hypokalemia  - Acid-Base: Acidosis, non-anion gap, metabolic  - Anemia: none       Other major problems: Management per primary and other consulting teams. Paroxysmal A.  Fib    Urinary tract infection        Hospital Problems             Last Modified POA    * (Principal) SCHMID (dyspnea on exertion) 2022 Yes       : other supportive care :   - Check daily renal function panel with electrolytes-phosphorus  - Strict monitoring of I/Os, daily weight  - Renal feeds/diet  - Current medications reviewed. - Nephrotoxic medications have been discontinued. - Dose adjusted and appropriate. - Dose meds for eGFR <15 mL/min/1.73m2 during GIANA    - Avoid heavy opioids due to renal failure - may use very low dose dilaudid / fentanyl with close monitoring of CNS and respiratory depression. Please refer to the orders. High Complexity. Multiple complex problems. Discussed with patient and treatment team-    Time spent > 30 (~35) minutes. Thank you for allowing me to participate in this patient's care. Please do not hesitate to contact me anytime. We will follow along with you. Bj Willis MD,  [facility] [address] [place] [address] [place]: [phone] or [employer]  Fax: [phone]        =======================================================================================   =======================================================================================  Subjective / interval history:   S/p [facility] yesterday   Patient was seen comfortably sitting up in the bed,   Reported no active complaints,   Renal function stable. Past medical, Surgical, Social, Family medical history reviewed by me. MEDICATIONS: reviewed by me. Medications Prior to Admission:  No current facility-administered medications on file prior to encounter. Current Outpatient Medications on File Prior to Encounter   Medication Sig Dispense Refill    HYDROcodone-acetaminophen (NORCO) 5-325 MG per tablet Take 1 tablet by mouth 4 times daily as needed for Pain for up to 30 days.  120 tablet 0    torsemide (DEMADEX) 10 MG tablet 20 mg 2 days of the week and 10 mg the rest 135 tablet 1    albuterol sulfate HFA (VENTOLIN HFA) 108 (90 Base) MCG/ACT inhaler Inhale 2 puffs into the lungs 4 times daily as needed for Wheezing 18 g 0    vitamin D (ERGOCALCIFEROL) 1.25 MG (79352 UT) CAPS capsule TAKE 1 CAPSULE ONCE A WEEK 12 capsule 1    aspirin EC 81 MG EC tablet Take 1 tablet by mouth in the morning. 90 tablet 1    clopidogrel (PLAVIX) 75 MG tablet Take 1 tablet by mouth in the morning. 30 tablet 3    allopurinol (ZYLOPRIM) 100 MG tablet TAKE 1 TABLET EVERY DAY 90 tablet 1    levothyroxine (SYNTHROID) 125 MCG tablet Take 1 tablet by mouth Daily 90 tablet 1    topiramate (TOPAMAX) 50 MG tablet TAKE 2 TABLETS TWICE DAILY 360 tablet 1    nitroGLYCERIN (NITROLINGUAL) 0.4 MG/SPRAY 0.4 mg spray Place 1 spray under the tongue every 5 minutes as needed for Chest pain 4.9 g 1    fluticasone (FLONASE) 50 MCG/ACT nasal spray 2 sprays by Each Nostril route daily 16 g 0    metoprolol succinate (TOPROL XL) 25 MG extended release tablet Take 0.5 tablets by mouth daily . 5 tablet daily 45 tablet 0         Current Facility-Administered Medications:     sodium chloride flush 0.9 % injection 5-40 mL, 5-40 mL, IntraVENous, PRN, Yusra Sequeira MD    acetylcysteine (MUCOMYST) 20 % solution 600 mg, 600 mg, Oral, BID, Guille Peterson MD, 600 mg at 09/12/22 1714    ascorbic acid (VITAMIN C) tablet 500 mg, 500 mg, Oral, TID, Guille Peterson MD, 500 mg at 09/13/22 0905    ziprasidone (GEODON) injection 20 mg, 20 mg, IntraMUSCular, Once, Jean Claude Fowler PA-C    potassium chloride (KLOR-CON M) extended release tablet 40 mEq, 40 mEq, Oral, PRN **OR** potassium bicarb-citric acid (EFFER-K) effervescent tablet 40 mEq, 40 mEq, Oral, PRN, 40 mEq at 09/11/22 1805 **OR** potassium chloride 10 mEq/100 mL IVPB (Peripheral Line), 10 mEq, IntraVENous, PRN, Mery Regalado MD    hydrALAZINE (APRESOLINE) injection 10 mg, 10 mg, IntraVENous, Q6H PRN, Mery Regalado MD, 10 mg at 09/12/22 1717    cefTRIAXone (ROCEPHIN) 1000 mg in sterile water 10 mL IV syringe, 1,000 mg, IntraVENous, Q24H, Mery Regalado MD, 1,000 mg at 09/12/22 1713    albuterol sulfate HFA (PROVENTIL;VENTOLIN;PROAIR) 108 (90 Base) MCG/ACT inhaler 2 puff, 2 puff, Inhalation, 4x Daily PRN, Jourdan Manning MD, 2 puff at 09/11/22 0141    aspirin EC tablet 81 mg, 81 mg, Oral, Daily, Jourdan Manning MD, 81 mg at 09/13/22 6334    clopidogrel (PLAVIX) tablet 75 mg, 75 mg, Oral, Daily, Jourdan Manning MD, 75 mg at 09/13/22 0906    fluticasone (FLONASE) 50 MCG/ACT nasal spray 2 spray, 2 spray, Each Nostril, Daily, Jourdan Manning MD    levothyroxine (SYNTHROID) tablet 125 mcg, 125 mcg, Oral, Daily, Jourdan Manning MD, 125 mcg at 09/13/22 0704    HYDROcodone-acetaminophen (NORCO) 5-325 MG per tablet 1 tablet, 1 tablet, Oral, 4x Daily PRN, Jourdan Manning MD, 1 tablet at 09/12/22 2112    metoprolol succinate (TOPROL XL) extended release tablet 12.5 mg, 12.5 mg, Oral, Daily, Jourdan Manning MD, 12.5 mg at 09/13/22 0905    sodium chloride flush 0.9 % injection 5-40 mL, 5-40 mL, IntraVENous, 2 times per day, Jourdan Manning MD, 10 mL at 09/13/22 0906    sodium chloride flush 0.9 % injection 5-40 mL, 5-40 mL, IntraVENous, PRN, Jourdan Manning MD, 10 mL at 09/11/22 1805    0.9 % sodium chloride infusion, , IntraVENous, PRN, Jourdan Manning MD    enoxaparin Sodium (LOVENOX) injection 30 mg, 30 mg, SubCUTAneous, Daily, Jourdan Manning MD, 30 mg at 09/13/22 0906    ondansetron (ZOFRAN-ODT) disintegrating tablet 4 mg, 4 mg, Oral, Q8H PRN **OR** [DISCONTINUED] ondansetron (ZOFRAN) injection 4 mg, 4 mg, IntraVENous, Q6H PRN, Jourdan Manning MD    polyethylene glycol (GLYCOLAX) packet 17 g, 17 g, Oral, Daily PRN, Jourdan Manning MD    acetaminophen (TYLENOL) tablet 650 mg, 650 mg, Oral, Q6H PRN **OR** acetaminophen (TYLENOL) suppository 650 mg, 650 mg, Rectal, Q6H PRN, Jourdan Manning MD         REVIEW OF SYSTEMS:  As mentioned in chief complaint and HPI , Subjective          =======================================================================================     PHYSICAL EXAM:  Recent vital signs and recent I/Os reviewed by me.      Wt Readings from Last 3 Encounters:   09/13/22 113 lb (51.3 kg)   09/06/22 120 lb (54.4 kg)   09/01/22 120 lb (54.4 kg)     BP Readings from Last 3 Encounters:   09/13/22 (!) 168/79   09/01/22 (!) 142/61   08/23/22 110/60     Patient Vitals for the past 24 hrs:   BP Temp Temp src Pulse Resp SpO2 Weight   09/13/22 0900 (!) 168/79 97.7 °F (36.5 °C) Oral 83 16 97 % --   09/13/22 0526 125/73 97.7 °F (36.5 °C) Oral 75 17 95 % --   09/13/22 0315 -- -- -- -- -- -- 113 lb (51.3 kg)   09/13/22 0115 129/69 97.5 °F (36.4 °C) Oral 78 17 96 % --   09/12/22 1934 131/70 97.9 °F (36.6 °C) Oral 72 16 98 % --   09/12/22 1847 (!) 132/56 -- -- 81 -- -- --   09/12/22 1700 (!) 171/58 -- -- 72 -- -- --   09/12/22 1530 (!) 154/72 -- -- 72 16 100 % --   09/12/22 1516 (!) 162/63 -- -- 75 16 100 % --   09/12/22 1501 (!) 150/55 -- -- 75 16 100 % --   09/12/22 1439 (!) 147/63 -- -- 71 16 100 % --   09/12/22 1409 (!) 149/74 -- -- 75 16 100 % --   09/12/22 1350 (!) 144/82 97.8 °F (36.6 °C) Oral 76 16 100 % --         Physical Exam  Vitals reviewed. Constitutional:       General: She is not in acute distress. Appearance: Normal appearance. She is ill-appearing. HENT:      Head: Normocephalic and atraumatic. Right Ear: External ear normal.      Left Ear: External ear normal.      Nose: Nose normal.      Mouth/Throat:      Mouth: Mucous membranes are moist. Mucous membranes are not dry. Eyes:      General: No scleral icterus. Conjunctiva/sclera: Conjunctivae normal.   Neck:      Vascular: No JVD. Cardiovascular:      Rate and Rhythm: Normal rate and regular rhythm. Heart sounds: S1 normal and S2 normal.   Pulmonary:      Effort: Pulmonary effort is normal. No respiratory distress. Breath sounds: Rhonchi present. Abdominal:      General: Bowel sounds are normal. There is no distension. Musculoskeletal:         General: Swelling present. No deformity. Cervical back: Normal range of motion and neck supple. Skin:     General: Skin is dry.       Coloration: Skin is not jaundiced. Neurological:      Mental Status: She is alert and oriented to person, place, and time. Mental status is at baseline. Psychiatric:         Mood and Affect: Mood normal.         Behavior: Behavior normal.          Intake/Output Summary (Last 24 hours) at 9/13/2022 1136  Last data filed at 9/13/2022 0932  Gross per 24 hour   Intake 720 ml   Output --   Net 720 ml             =======================================================================================     DATA:  Diagnostic tests reviewed by me for today's visit:   (AS NEEDED FOR MY EVALUATION AND MANAGEMENT). Recent Labs     09/10/22  1634 09/11/22  0212 09/11/22  0718 09/12/22  0517 09/13/22  0458   WBC 10.1 13.2* 15.2* 8.9 8.5   HCT 42.7 45.8 45.3 43.4 41.8    374 358 334 310       Iron Saturation:  No components found for: PERCENTFE  FERRITIN:  No results found for: FERRITIN  IRON:    Lab Results   Component Value Date/Time    IRON 54 11/09/2020 11:05 AM     TIBC:    Lab Results   Component Value Date/Time    TIBC 216 11/09/2020 11:05 AM       Recent Labs     09/10/22  1634 09/11/22  0212 09/11/22  0718 09/12/22  0517 09/13/22  0458    142 138 142 140   K 4.7 3.3* 3.4* 3.7 4.1   * 108 107 109 109   CO2 22 19* 18* 23 18*   BUN 31* 27* 25* 28* 30*   CREATININE 1.3* 1.2 1.1 1.3* 1.2       Recent Labs     09/10/22  1634 09/11/22  0212 09/11/22  0718 09/12/22  0517 09/13/22  0458   CALCIUM 9.0 9.4 8.8 9.1 8.6   MG  --  2.00 2.10  --   --        No results for input(s): PH, PCO2, PO2 in the last 72 hours.     Invalid input(s): [id number], INSPIREDO2    ABG:  No results found for: PH, PCO2, PO2, HCO3, BE, THGB, TCO2, O2SAT  VBG:    Lab Results   Component Value Date/Time    PHVEN 7.37 03/20/2017 05:26 AM    XHS4PEY 29.7 03/20/2017 05:26 AM    BEVEN -7.1 03/20/2017 05:26 AM    [id number] 99 03/20/2017 05:26 AM       LDH:  No results found for: LDH  Uric Acid:    Lab Results   Component Value Date/Time    LABURIC 4.8 05/28/2019 12:31 PM       PT/INR:    Lab Results   Component Value Date/Time    PROTIME 13.5 01/22/2022 02:16 PM    INR 1.19 01/22/2022 02:16 PM     Warfarin PT/INR:  No components found for: PTPATWAR, PTINRWAR  PTT:    Lab Results   Component Value Date/Time    APTT 42.5 01/22/2022 02:16 PM   [APTT}  Last 3 Troponin:    Lab Results   Component Value Date/Time    TROPONINI <0.01 09/10/2022 09:07 PM    TROPONINI <0.01 09/10/2022 04:34 PM    TROPONINI <0.01 09/01/2022 07:15 AM       U/A:    Lab Results   Component Value Date/Time    COLORU ORANGE 09/11/2022 12:31 PM    PROTEINU TRACE 09/11/2022 12:31 PM    PHUR 6.5 09/11/2022 12:31 PM    WBCUA 20 09/11/2022 12:31 PM    RBCUA 715 09/11/2022 12:31 PM    YEAST neg 03/09/2021 02:00 PM    BACTERIA None Seen 09/11/2022 12:31 PM    CLARITYU Clear 09/11/2022 12:31 PM    SPECGRAV 1.010 09/11/2022 12:31 PM    LEUKOCYTESUR MODERATE 09/11/2022 12:31 PM    UROBILINOGEN 0.2 09/11/2022 12:31 PM    BILIRUBINUR Negative 09/11/2022 12:31 PM    BILIRUBINUR NEGATIVE 12/04/2011 06:40 PM    BLOODU LARGE 09/11/2022 12:31 PM    GLUCOSEU Negative 09/11/2022 12:31 PM    GLUCOSEU NEGATIVE 12/04/2011 06:40 PM     Microalbumen/Creatinine ratio:  No components found for: RUCREAT  24 Hour Urine for Protein:  No components found for: RAWUPRO, UHRS3, QQLG91TN, UTV3  24 Hour Urine for Creatinine Clearance:  No components found for: CREAT4, UHRS10, UTV10  Urine Toxicology:  No components found for: IAMMENTA, IBARBIT, IBENZO, ICOCAINE, IMARTHC, IOPIATES, IPHENCYC    HgBA1c:    Lab Results   Component Value Date/Time    LABA1C 5.5 01/23/2022 04:22 AM     RPR:  No results found for: RPR  HIV:  No results found for: HIV  REMY:  No results found for: ANATITER, REMY  RF:  No results found for: RF  DSDNA:  No components found for: DNA  AMYLASE:  No results found for: AMYLASE  LIPASE:  No results found for: LIPASE  Fibrinogen Level:  No components found for: FIB       BELOW MENTIONED RADIOLOGY STUDY RESULTS BY ME (AS NEEDED FOR MY EVALUATION AND MANAGEMENT). XR CHEST (2 VW)    Result Date: 9/1/2022  EXAMINATION: TWO XRAY VIEWS OF THE CHEST 9/1/2022 7:11 am COMPARISON: 05/27/2022 HISTORY: ORDERING SYSTEM PROVIDED HISTORY: palpitations. no chest pain or fever or cough. longstanding dizziness TECHNOLOGIST PROVIDED HISTORY: Reason for exam:->palpitations. no chest pain or fever or cough. longstanding dizziness FINDINGS: Left-sided bipolar cardiac pacer is stable in configuration. The heart is enlarged with mild central pulmonary venous congestion there is no consolidation, pneumothorax or effusion. 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 Cardiomegaly with mild pulmonary venous congestion.

## 2022-09-14 LAB
ANION GAP SERPL CALCULATED.3IONS-SCNC: 9 MMOL/L (ref 3–16)
BASOPHILS ABSOLUTE: 0.1 K/UL (ref 0–0.2)
BASOPHILS RELATIVE PERCENT: 1.4 %
BUN BLDV-MCNC: 27 MG/DL (ref 7–20)
CALCIUM SERPL-MCNC: 8.9 MG/DL (ref 8.3–10.6)
CHLORIDE BLD-SCNC: 108 MMOL/L (ref 99–110)
CO2: 22 MMOL/L (ref 21–32)
CREAT SERPL-MCNC: 1.3 MG/DL (ref 0.6–1.2)
EKG ATRIAL RATE: 74 BPM
EKG DIAGNOSIS: NORMAL
EKG Q-T INTERVAL: 432 MS
EKG QRS DURATION: 102 MS
EKG QTC CALCULATION (BAZETT): 479 MS
EKG R AXIS: -23 DEGREES
EKG T AXIS: 91 DEGREES
EKG VENTRICULAR RATE: 74 BPM
EOSINOPHILS ABSOLUTE: 0.4 K/UL (ref 0–0.6)
EOSINOPHILS RELATIVE PERCENT: 4.8 %
GFR AFRICAN AMERICAN: 47
GFR NON-AFRICAN AMERICAN: 39
GLUCOSE BLD-MCNC: 100 MG/DL (ref 70–99)
GLUCOSE BLD-MCNC: 475 MG/DL (ref 70–99)
GLUCOSE BLD-MCNC: 92 MG/DL (ref 70–99)
GLUCOSE BLD-MCNC: 93 MG/DL (ref 70–99)
HCT VFR BLD CALC: 40.9 % (ref 36–48)
HEMOGLOBIN: 12.7 G/DL (ref 12–16)
LIPASE: 25 U/L (ref 13–60)
LYMPHOCYTES ABSOLUTE: 0.9 K/UL (ref 1–5.1)
LYMPHOCYTES RELATIVE PERCENT: 12 %
MCH RBC QN AUTO: 26.6 PG (ref 26–34)
MCHC RBC AUTO-ENTMCNC: 31.1 G/DL (ref 31–36)
MCV RBC AUTO: 85.6 FL (ref 80–100)
MONOCYTES ABSOLUTE: 0.5 K/UL (ref 0–1.3)
MONOCYTES RELATIVE PERCENT: 6.5 %
NEUTROPHILS ABSOLUTE: 5.5 K/UL (ref 1.7–7.7)
NEUTROPHILS RELATIVE PERCENT: 75.3 %
PDW BLD-RTO: 20.1 % (ref 12.4–15.4)
PERFORMED ON: ABNORMAL
PERFORMED ON: ABNORMAL
PERFORMED ON: NORMAL
PLATELET # BLD: 303 K/UL (ref 135–450)
PMV BLD AUTO: 8.9 FL (ref 5–10.5)
POTASSIUM SERPL-SCNC: 3.2 MMOL/L (ref 3.5–5.1)
RBC # BLD: 4.78 M/UL (ref 4–5.2)
SODIUM BLD-SCNC: 139 MMOL/L (ref 136–145)
TROPONIN: <0.01 NG/ML
WBC # BLD: 7.4 K/UL (ref 4–11)

## 2022-09-14 PROCEDURE — 83690 ASSAY OF LIPASE: CPT

## 2022-09-14 PROCEDURE — 6370000000 HC RX 637 (ALT 250 FOR IP): Performed by: INTERNAL MEDICINE

## 2022-09-14 PROCEDURE — 36415 COLL VENOUS BLD VENIPUNCTURE: CPT

## 2022-09-14 PROCEDURE — 93005 ELECTROCARDIOGRAM TRACING: CPT | Performed by: INTERNAL MEDICINE

## 2022-09-14 PROCEDURE — 2580000003 HC RX 258: Performed by: INTERNAL MEDICINE

## 2022-09-14 PROCEDURE — 97165 OT EVAL LOW COMPLEX 30 MIN: CPT

## 2022-09-14 PROCEDURE — 84484 ASSAY OF TROPONIN QUANT: CPT

## 2022-09-14 PROCEDURE — 97530 THERAPEUTIC ACTIVITIES: CPT

## 2022-09-14 PROCEDURE — 97161 PT EVAL LOW COMPLEX 20 MIN: CPT

## 2022-09-14 PROCEDURE — 80048 BASIC METABOLIC PNL TOTAL CA: CPT

## 2022-09-14 PROCEDURE — 93010 ELECTROCARDIOGRAM REPORT: CPT | Performed by: INTERNAL MEDICINE

## 2022-09-14 PROCEDURE — 2060000000 HC ICU INTERMEDIATE R&B

## 2022-09-14 PROCEDURE — 6360000002 HC RX W HCPCS: Performed by: INTERNAL MEDICINE

## 2022-09-14 PROCEDURE — 85025 COMPLETE CBC W/AUTO DIFF WBC: CPT

## 2022-09-14 PROCEDURE — 99233 SBSQ HOSP IP/OBS HIGH 50: CPT | Performed by: INTERNAL MEDICINE

## 2022-09-14 RX ORDER — INSULIN LISPRO 100 [IU]/ML
0-4 INJECTION, SOLUTION INTRAVENOUS; SUBCUTANEOUS NIGHTLY
Status: DISCONTINUED | OUTPATIENT
Start: 2022-09-14 | End: 2022-09-16 | Stop reason: HOSPADM

## 2022-09-14 RX ORDER — INSULIN LISPRO 100 [IU]/ML
0-8 INJECTION, SOLUTION INTRAVENOUS; SUBCUTANEOUS
Status: DISCONTINUED | OUTPATIENT
Start: 2022-09-14 | End: 2022-09-16 | Stop reason: HOSPADM

## 2022-09-14 RX ORDER — ONDANSETRON 2 MG/ML
4 INJECTION INTRAMUSCULAR; INTRAVENOUS ONCE
Status: DISCONTINUED | OUTPATIENT
Start: 2022-09-14 | End: 2022-09-16 | Stop reason: HOSPADM

## 2022-09-14 RX ORDER — CALCIUM CARBONATE 200(500)MG
500 TABLET,CHEWABLE ORAL ONCE
Status: COMPLETED | OUTPATIENT
Start: 2022-09-14 | End: 2022-09-14

## 2022-09-14 RX ORDER — FAMOTIDINE 20 MG/1
20 TABLET, FILM COATED ORAL
Status: DISCONTINUED | OUTPATIENT
Start: 2022-09-15 | End: 2022-09-16 | Stop reason: HOSPADM

## 2022-09-14 RX ORDER — DEXTROSE MONOHYDRATE 100 MG/ML
INJECTION, SOLUTION INTRAVENOUS CONTINUOUS PRN
Status: DISCONTINUED | OUTPATIENT
Start: 2022-09-14 | End: 2022-09-16 | Stop reason: HOSPADM

## 2022-09-14 RX ADMIN — Medication 10 ML: at 20:15

## 2022-09-14 RX ADMIN — FLUTICASONE PROPIONATE 2 SPRAY: 50 SPRAY, METERED NASAL at 08:17

## 2022-09-14 RX ADMIN — CLOPIDOGREL BISULFATE 75 MG: 75 TABLET ORAL at 08:22

## 2022-09-14 RX ADMIN — OXYCODONE HYDROCHLORIDE AND ACETAMINOPHEN 500 MG: 500 TABLET ORAL at 20:14

## 2022-09-14 RX ADMIN — ACETYLCYSTEINE 600 MG: 200 SOLUTION ORAL; RESPIRATORY (INHALATION) at 08:16

## 2022-09-14 RX ADMIN — HYDRALAZINE HYDROCHLORIDE 10 MG: 20 INJECTION INTRAMUSCULAR; INTRAVENOUS at 17:11

## 2022-09-14 RX ADMIN — HYDROCODONE BITARTRATE AND ACETAMINOPHEN 1 TABLET: 5; 325 TABLET ORAL at 21:59

## 2022-09-14 RX ADMIN — ANTACID TABLETS 500 MG: 500 TABLET, CHEWABLE ORAL at 17:11

## 2022-09-14 RX ADMIN — ACETYLCYSTEINE 600 MG: 200 SOLUTION ORAL; RESPIRATORY (INHALATION) at 20:42

## 2022-09-14 RX ADMIN — Medication 10 ML: at 08:18

## 2022-09-14 RX ADMIN — POTASSIUM CHLORIDE 40 MEQ: 1500 TABLET, EXTENDED RELEASE ORAL at 10:06

## 2022-09-14 RX ADMIN — ASPIRIN 81 MG: 81 TABLET, COATED ORAL at 08:21

## 2022-09-14 RX ADMIN — OXYCODONE HYDROCHLORIDE AND ACETAMINOPHEN 500 MG: 500 TABLET ORAL at 14:54

## 2022-09-14 RX ADMIN — ACETYLCYSTEINE 600 MG: 200 SOLUTION ORAL; RESPIRATORY (INHALATION) at 00:13

## 2022-09-14 RX ADMIN — METOPROLOL SUCCINATE 12.5 MG: 25 TABLET, EXTENDED RELEASE ORAL at 08:21

## 2022-09-14 ASSESSMENT — PAIN SCALES - GENERAL
PAINLEVEL_OUTOF10: 0
PAINLEVEL_OUTOF10: 4
PAINLEVEL_OUTOF10: 0
PAINLEVEL_OUTOF10: 0

## 2022-09-14 NOTE — PROGRESS NOTES
[facility] [facility]   Phone: [phone]    Physical Therapy    [x] Initial Evaluation            [] Daily Treatment Note         [] Discharge Summary      Patient: Sekou Johnson   :    MRN: [id number]   Date of Service:  2022  Admitting Diagnosis: SCHMID (dyspnea on exertion)  Current Admission Summary: Sekou Johnson is a 80 y.o. female with past medical history of CAD, atrial fibrillation, CHF, hyperlipidemia, hypertension who presents to the ED with complaint of shortness of breath. Patient states has had increasing shortness of breath especially with exertion over the past couple of days. Patient states he is followed up with cardiology on the outpatient basis. Had stress test she states in the past month which is abnormal.  She is scheduled to have a cardiac catheterization/angiogram on Monday with Dr. Amisha Lopez. Patient states she had increasing shortness of breath, exercise intolerance and weakness so came to the ED for further evaluation and treatment. Denies any chest pain or chest tightness. Denies cough, hemoptysis, pleuritic pain, orthopnea, pedal edema or calf tenderness. Denies fever or chills. Denies rashes or lesions. Denies abdominal pain, nausea/vomiting, urinary symptoms or changes in bowel movements.   Past Medical History:  has a past medical history of Allergic rhinitis, cause unspecified, Arthritis, Atrial fibrillation ([address] Bronchopneumonia, CAD (coronary artery disease), Cerebral artery occlusion with cerebral infarction ([address] Chronic gouty arthropathy, Chronic kidney disease, Congestive heart failure, unspecified, Degeneration of cervical intervertebral disc, Essential and other specified forms of tremor, Gout, HIGH CHOLESTEROL, History of CVA (cerebrovascular accident), Hx of blood clots, Hypertension, Influenza, Leakage of Watchman left atrial appendage closure device, Mitral valve stenosis and aortic valve insufficiency, Movement disorder, Pacemaker, Peptic ulcer, unspecified site, unspecified as acute or chronic, without mention of hemorrhage, perforation, or obstruction, Thyroid disease, Unspecified disorder of kidney and ureter, and Unspecified hypothyroidism. Past Surgical History:  has a past surgical history that includes back surgery; Cholecystectomy; Cardiac surgery; Coronary artery bypass graft (1987); shoulder surgery; Cardiac catheterization (7/2012); hip surgery (Left, 03/16/2017); joint replacement; Cardiac catheterization (08/07/2018); Percutaneous Transluminal Coronary Angio (11/04/2014); pr njx aa&/strd tfrml epi lumbar/sacral 1 level (Right, 12/3/2018); Femoral-femoral Bypass Graft (N/A, 8/22/2019); femoral bypass (Left, 8/22/2019); and IR KYPHOPLASTY LUMBAR 1 VERTEBRAL BODY (5/14/2021). Discharge Recommendations: Christiano Nevarez scored a 19/24 on the AM-PAC short mobility form. Current research shows that an AM-PAC score of 18 or greater is typically associated with a discharge to the patient's home setting. Based on the patient's AM-PAC score and their current functional mobility deficits, it is recommended that the patient have 2-3 sessions per week of Physical Therapy at d/c to increase the patient's independence. At this time, this patient demonstrates the endurance and safety to discharge home with HHPT (home vs OP services) and a follow up treatment frequency of 2-3x/wk. Please see assessment section for further patient specific details.     HOME HEALTH CARE: LEVEL 3 SAFETY     - Initial home health evaluation to occur within 24-48 hours, in patient home   - Therapy evaluations in home within 24-48 hours of discharge; including DME and home safety   - Frontload therapy 5 days, then 3x a week   - Therapy to evaluate if patient has [address] needs for personal care   -  evaluation within 24-48 hours, includes evaluation of resources and insurance to determine [place], [place], LTC, and Medicaid options     If patient discharges prior to next session this note will serve as a discharge summary. Please see below for the latest assessment towards goals. DME Required For Discharge: DME to be determined pending patient progress  Precautions/Restrictions: high fall risk  Weight Bearing Restrictions: no restrictions  [] Right Upper Extremity  [] Left Upper Extremity [] Right Lower Extremity  [] Left Lower Extremity     Required Braces/Orthotics: no braces required   [] Right  [] Left  Positional Restrictions:no positional restrictions    Pre-Admission Information   Lives With: . Comment: 41 yo grandson \"Eddie\"     Type of Home: house  Home Layout: one level  Home Access:  2 step to enter without rails  on porch, usually comes through garage w/o steps  Bathroom Layout: tub only  Bathroom Equipment: grab bars in shower, grab bars around toilet, shower chair, hand held shower head  Toilet Height: elevated height  Home Equipment: rollator - 4 wheeled walker, manual wheelchair, reacher  Transfer Assistance: Independent without use of device  Ambulation Assistance:modified independent with use of 4WW  at home, use electric cart within community  ADL Assistance: independent with all ADL's, requires assistance with dressing for socks  IADL Assistance: requires assistance with laundry, requires assistance with cleaning, requires assistance with yard work, requires assistance with driving/transportation  Active :        [] Yes  [x] No  Hand Dominance: [] Left  [] Right  Current Employment: retired.   Occupation: administrative work at [employer]: small dog, reading, watch TV  Recent Falls: 4-5 falls within 6 months due to lifting dog and tripping on rugs    Examination   Vision:   Vision Gross Assessment: Impaired and Vision Corrective Device: wears glasses for reading  Hearing:   hard of hearing, looking into hearing aides  Observation:   General Observation:  bruising on bilateral UE from recent falls  Sensation:   WFL  Tone:   Normotonic  ROM:   (B) LE AROM WFL  Strength:   (B) LE strength grossly WFL  Decision Making: low complexity  Clinical Presentation: stable      Subjective  General: Pt semi-reclined in bed upon arrival. Daughter \"Laura\" present at bedside. Pt very pleasant and agreeable to PT/OT eval. Pt states that she does not have pain, but episodes of feeling SOB. Pt with close family support with grandson that lives with her and daughter that lives close by and checks on her daily. Pain: 0/10  Pain Interventions: not applicable       Functional Mobility  Bed Mobility  Supine to Sit: stand by assistance  Sit to Supine: stand by assistance  Comments: HOB elevated, assistance required to don shoes while sitting on EOB  Transfers  Sit to stand transfer: stand by assistance  Stand to sit transfer: stand by assistance  Comments: SBA for sit <> stand from EOB for safety, no walker needed for transfer  Ambulation  Surface:level surface  Assistive Device: rolling walker  Assistance: contact guard assistance  Distance: 100 ft  Gait Mechanics: narrow MICHELE, decreased toe clearance, decreased hip flex/extension, decreased step length  Comments:  pt ambulated from EOB <> hallway with RW for stability and CGA for safety w/ feeling slightly SOB  Stair Mobility  Stair mobility not completed on this date. Comments:  Wheelchair Mobility:  No w/c mobility completed on this date. Comments:  Balance  Static Sitting Balance: good: independent with functional balance in unsupported position  Dynamic Sitting Balance: good: independent with functional balance in unsupported position  Static Standing Balance: fair (+): maintains balance at SBA/supervision without use of UE support  Dynamic Standing Balance: fair (-): maintains balance at CGA with use of UE support  Comments: pt sat on EOB for ~5 mins prior to ambulation with good balance. Assistance needed to don shoes d/t decreased flexibility.  Pt able to maintain balance with ambulating 100 ft with RW and no episodes of LOB or concern for fall    Other Therapeutic Interventions    Functional Outcomes  AM-PAC Inpatient Mobility Raw Score : 19              Cognition  WFL  Orientation:    alert and oriented x 4  Command Following:   [facility]    Education  Barriers To Learning: none  Patient Education: patient educated on goals, PT role and benefits, plan of care, general safety, functional mobility training, family education, transfer training, discharge recommendations  Learning Assessment:  patient verbalizes and demonstrates understanding    Assessment  Activity Tolerance: limited by feeling SOB more than mm endurance. Following ambulation in hallway, SpO2 above 95% and able to fully recover within a few minutes. Impairments Requiring Therapeutic Intervention: decreased functional mobility, decreased ADL status, decreased strength, decreased endurance, decreased balance  Prognosis: good  Clinical Assessment: Pt presents this date s/p acute SCHMID and multiple falls at home, which impairs her independence with functional mobility. Pt PLOF is independent for transfers and MOD I with walker at home. Currently pt still below baseline status, but able to ambulate household distance with RW and CGA for safety. Pt would benefit from skilled PT services to improve aerobic endurance, increase strength and decrease risk of falls before d/c home.   Safety Interventions: patient left in bed, bed alarm in place, call light within reach, gait belt, patient at risk for falls, nurse notified, and family/caregiver present    Plan  Frequency: 3-5 x/per week  Current Treatment Recommendations: strengthening, balance training, functional mobility training, gait training, stair training, endurance training, patient/caregiver education, ADL/self-care training, home exercise program, and safety education    Goals  Patient Goals: to return home   Short Term Goals:  Time Frame: upon d/c  Patient will complete transfers at supervision   Patient will ambulate 150 ft with use of LRAD at stand by assistance  Patient will ascend/descend 2 stairs without use of HR at contact guard assistance    Therapy Session Time      Individual Group Co-treatment   Time In     1153   Time Out     1233   Minutes     40     Timed Code Treatment Minutes:  25   Total Treatment Minutes:  [address], SPT    PT providing direct supervision during session and assisting in making skilled judgements throughout session.   [address], [place] [address]    Electronically Signed By: Man Lewis, PT

## 2022-09-14 NOTE — PROGRESS NOTES
MD Kell Fernández MD Retta Loots, MD               Office: [phone]                      Fax: [phone]              [facility]                   NEPHROLOGY INPATIENT PROGRESS NOTE:     PATIENT NAME: Kareem Nation  : 1940  MRN: [id number]      RECOMMENDATIONS:   -no signs of CI-GIANA after LHC   -Hold further diuresis - as ? Plans for CTA    - K repletion    -Given IV Lasix with good response  -Was on torsemide 10/20-alternative days - can resume on dc, if renal fx stable w/o CI-GIANA in 24 hrs  -Was on Aldactone,    - on CT -  A few subcentimeter hyperdense nodules are seen in  the right and left kidney, incompletely characterized on noncontrast study,  most likely hemorrhagic or proteinaceous cyst.  Simple appearing cysts are  seen in the right and left kidney    ->> Urology is already following - for gross hematuria, plans for CT/cysto/cyto = as outpt     -Follow-up urine culture  - at higher risk for decompensation, needing closer monitoring. D/C plan from renal stand point: stable   -Follows with me, next appointment on 2022    Discussed with patient, her daughter, team-hospitalist Dr Bobby Messina:       Admitted for:  SCHMID (dyspnea on exertion) [R06.00]  Exertional dyspnea [R06.00]      Non-proteinuric CKD: 3B   - BL Scr-lowest recently about 1.2-1.3, same on admission-   -Thought to be due to hypertension, cardiorenal physiology  -Follows with me in the office      Associated problems:   Acute on chronic-systolic heart failure-  CXR - Cardiomegaly with mild pulmonary venous congestion.    Weight in past around 116-119 pounds      S/P [facility] 22 Dr Ramin Devine   IV contrast 61 cc  No LVEDP check       - Volume status: hyper-volemic  : BP: no need for tight control   : Na: controlled    - Azotemia: Prerenal  - Electrolytes: K: Hypokalemia  - Acid-Base: Acidosis, non-anion gap, metabolic  - Anemia: none       Other major problems: Management per primary and other consulting teams. Paroxysmal A. Fib    Urinary tract infection        Hospital Problems             Last Modified POA    * (Principal) SCHMID (dyspnea on exertion) 9/12/2022 Yes     : other supportive care :   - Check daily renal function panel with electrolytes-phosphorus  - Strict monitoring of I/Os, daily weight  - Renal feeds/diet  - Current medications reviewed. - Nephrotoxic medications have been discontinued. - Dose adjusted and appropriate. - Dose meds for eGFR <15 mL/min/1.73m2 during GIANA    - Avoid heavy opioids due to renal failure - may use very low dose dilaudid / fentanyl with close monitoring of CNS and respiratory depression. Please refer to the orders. High Complexity. Multiple complex problems. Discussed with patient and treatment team-    Time spent > 30 (~35) minutes. Thank you for allowing me to participate in this patient's care. Please do not hesitate to contact me anytime. We will follow along with you. Rand Scott MD,  [facility] [address] [place] [address] [place]: [phone] or Via [employer]  Fax: [phone]        =======================================================================================   =======================================================================================  Subjective / interval history:   S/p [facility] yesterday   Patient was seen comfortably sitting up in the bed,   Reported no active complaints,   Renal function stable. Past medical, Surgical, Social, Family medical history reviewed by me. MEDICATIONS: reviewed by me. Medications Prior to Admission:  No current facility-administered medications on file prior to encounter. Current Outpatient Medications on File Prior to Encounter   Medication Sig Dispense Refill    HYDROcodone-acetaminophen (NORCO) 5-325 MG per tablet Take 1 tablet by mouth 4 times daily as needed for Pain for up to 30 days.  120 tablet 0 hydrALAZINE (APRESOLINE) injection 10 mg, 10 mg, IntraVENous, Q6H PRN, Yumiko Schneider MD, 10 mg at 09/13/22 1639    albuterol sulfate HFA (PROVENTIL;VENTOLIN;PROAIR) 108 (90 Base) MCG/ACT inhaler 2 puff, 2 puff, Inhalation, 4x Daily PRN, Ricky Moe MD, 2 puff at 09/11/22 0141    aspirin EC tablet 81 mg, 81 mg, Oral, Daily, Ricky Moe MD, 81 mg at 09/14/22 2828    clopidogrel (PLAVIX) tablet 75 mg, 75 mg, Oral, Daily, Ricky Moe MD, 75 mg at 09/14/22 0822    fluticasone (FLONASE) 50 MCG/ACT nasal spray 2 spray, 2 spray, Each Nostril, Daily, Ricky Moe MD, 2 spray at 09/14/22 0817    levothyroxine (SYNTHROID) tablet 125 mcg, 125 mcg, Oral, Daily, Ricky Moe MD, 125 mcg at 09/13/22 0704    HYDROcodone-acetaminophen (NORCO) 5-325 MG per tablet 1 tablet, 1 tablet, Oral, 4x Daily PRN, Ricky Moe MD, 1 tablet at 09/13/22 2128    metoprolol succinate (TOPROL XL) extended release tablet 12.5 mg, 12.5 mg, Oral, Daily, Ricky Moe MD, 12.5 mg at 09/14/22 3457    sodium chloride flush 0.9 % injection 5-40 mL, 5-40 mL, IntraVENous, 2 times per day, Ricky Moe MD, 10 mL at 09/14/22 0818    sodium chloride flush 0.9 % injection 5-40 mL, 5-40 mL, IntraVENous, PRN, Ricky Moe MD, 10 mL at 09/11/22 1805    0.9 % sodium chloride infusion, , IntraVENous, PRN, Ricky Moe MD    enoxaparin Sodium (LOVENOX) injection 30 mg, 30 mg, SubCUTAneous, Daily, Ricky Moe MD, 30 mg at 09/13/22 0906    ondansetron (ZOFRAN-ODT) disintegrating tablet 4 mg, 4 mg, Oral, Q8H PRN **OR** [DISCONTINUED] ondansetron (ZOFRAN) injection 4 mg, 4 mg, IntraVENous, Q6H PRN, Ricky Moe MD    polyethylene glycol (GLYCOLAX) packet 17 g, 17 g, Oral, Daily PRN, Ricky Moe MD    acetaminophen (TYLENOL) tablet 650 mg, 650 mg, Oral, Q6H PRN **OR** acetaminophen (TYLENOL) suppository 650 mg, 650 mg, Rectal, Q6H PRN, Ricky Moe MD         REVIEW OF SYSTEMS:  As mentioned in chief complaint and HPI , Subjective          =======================================================================================     PHYSICAL EXAM:  Recent vital signs and recent I/Os reviewed by me. Wt Readings from Last 3 Encounters:   09/14/22 113 lb (51.3 kg)   09/06/22 120 lb (54.4 kg)   09/01/22 120 lb (54.4 kg)     BP Readings from Last 3 Encounters:   09/14/22 (!) 154/59   09/01/22 (!) 142/61   08/23/22 110/60     Patient Vitals for the past 24 hrs:   BP Temp Temp src Pulse Resp SpO2 Weight   09/14/22 0847 -- -- -- -- -- -- 113 lb (51.3 kg)   09/14/22 0815 (!) 154/59 -- -- 70 -- 95 % --   09/14/22 0442 (!) 150/62 98.2 °F (36.8 °C) Oral 75 16 95 % --   09/13/22 2356 (!) 123/51 98 °F (36.7 °C) Oral 78 16 95 % --   09/13/22 2040 (!) 158/66 98.5 °F (36.9 °C) Oral 71 16 96 % --   09/13/22 1715 (!) 141/54 -- -- 71 -- -- --   09/13/22 1630 (!) 177/67 -- -- 74 16 96 % --   09/13/22 1600 (!) 147/61 98.5 °F (36.9 °C) Oral 75 16 99 % --   09/13/22 1200 (!) 138/57 98.1 °F (36.7 °C) Oral 70 16 96 % --         Physical Exam  Vitals reviewed. Constitutional:       General: She is not in acute distress. Appearance: Normal appearance. She is ill-appearing. HENT:      Head: Normocephalic and atraumatic. Right Ear: External ear normal.      Left Ear: External ear normal.      Nose: Nose normal.      Mouth/Throat:      Mouth: Mucous membranes are moist. Mucous membranes are not dry. Eyes:      General: No scleral icterus. Conjunctiva/sclera: Conjunctivae normal.   Neck:      Vascular: No JVD. Cardiovascular:      Rate and Rhythm: Normal rate and regular rhythm. Heart sounds: S1 normal and S2 normal.   Pulmonary:      Effort: Pulmonary effort is normal. No respiratory distress. Breath sounds: Rhonchi present. Abdominal:      General: Bowel sounds are normal. There is no distension. Musculoskeletal:         General: Swelling present. No deformity.       Cervical back: Normal range of motion and neck supple. Skin:     General: Skin is dry. Coloration: Skin is not jaundiced. Neurological:      Mental Status: She is alert and oriented to person, place, and time. Mental status is at baseline. Psychiatric:         Mood and Affect: Mood normal.         Behavior: Behavior normal.          Intake/Output Summary (Last 24 hours) at 9/14/2022 1048  Last data filed at 9/14/2022 0816  Gross per 24 hour   Intake --   Output 150 ml   Net -150 ml             =======================================================================================     DATA:  Diagnostic tests reviewed by me for today's visit:   (AS NEEDED FOR MY EVALUATION AND MANAGEMENT). Recent Labs     09/12/22 0517 09/13/22 0458 09/14/22  0808   WBC 8.9 8.5 7.4   HCT 43.4 41.8 40.9    310 303       Iron Saturation:  No components found for: PERCENTFE  FERRITIN:  No results found for: FERRITIN  IRON:    Lab Results   Component Value Date/Time    IRON 54 11/09/2020 11:05 AM     TIBC:    Lab Results   Component Value Date/Time    TIBC 216 11/09/2020 11:05 AM       Recent Labs     09/12/22 0517 09/13/22 0458 09/14/22  0808    140 139   K 3.7 4.1 3.2*    109 108   CO2 23 18* 22   BUN 28* 30* 27*   CREATININE 1.3* 1.2 1.3*       Recent Labs     09/12/22 0517 09/13/22 0458 09/14/22  0808   CALCIUM 9.1 8.6 8.9       No results for input(s): PH, PCO2, PO2 in the last 72 hours.     Invalid input(s): Olman Gutierrez    ABG:  No results found for: PH, PCO2, PO2, HCO3, BE, THGB, TCO2, O2SAT  VBG:    Lab Results   Component Value Date/Time    PHVEN 7.37 03/20/2017 05:26 AM    KCK1TBS 29.7 03/20/2017 05:26 AM    BEVEN -7.1 03/20/2017 05:26 AM    Z1MXKJSB 99 03/20/2017 05:26 AM       LDH:  No results found for: LDH  Uric Acid:    Lab Results   Component Value Date/Time    LABURIC 4.8 05/28/2019 12:31 PM       PT/INR:    Lab Results   Component Value Date/Time    PROTIME 13.5 01/22/2022 02:16 PM    INR 1.19 01/22/2022 02:16 PM     Warfarin PT/INR:  No components found for: Carlene Mercedes  PTT:    Lab Results   Component Value Date/Time    APTT 42.5 01/22/2022 02:16 PM   [APTT}  Last 3 Troponin:    Lab Results   Component Value Date/Time    TROPONINI <0.01 09/13/2022 04:50 PM    TROPONINI <0.01 09/10/2022 09:07 PM    TROPONINI <0.01 09/10/2022 04:34 PM       U/A:    Lab Results   Component Value Date/Time    COLORU ORANGE 09/11/2022 12:31 PM    PROTEINU TRACE 09/11/2022 12:31 PM    PHUR 6.5 09/11/2022 12:31 PM    WBCUA 20 09/11/2022 12:31 PM    RBCUA 715 09/11/2022 12:31 PM    YEAST neg 03/09/2021 02:00 PM    BACTERIA None Seen 09/11/2022 12:31 PM    CLARITYU Clear 09/11/2022 12:31 PM    SPECGRAV 1.010 09/11/2022 12:31 PM    LEUKOCYTESUR MODERATE 09/11/2022 12:31 PM    UROBILINOGEN 0.2 09/11/2022 12:31 PM    BILIRUBINUR Negative 09/11/2022 12:31 PM    BILIRUBINUR NEGATIVE 12/04/2011 06:40 PM    BLOODU LARGE 09/11/2022 12:31 PM    GLUCOSEU Negative 09/11/2022 12:31 PM    GLUCOSEU NEGATIVE 12/04/2011 06:40 PM     Microalbumen/Creatinine ratio:  No components found for: RUCREAT  24 Hour Urine for Protein:  No components found for: RAWUPRO, UHRS3, [id number], UTV3  24 Hour Urine for Creatinine Clearance:  No components found for: CREAT4, UHRS10, UTV10  Urine Toxicology:  No components found for: IAMMENTA, IBARBIT, IBENZO, ICOCAINE, IMARTHC, IOPIATES, IPHENCYC    HgBA1c:    Lab Results   Component Value Date/Time    LABA1C 5.5 01/23/2022 04:22 AM     RPR:  No results found for: RPR  HIV:  No results found for: HIV  REMY:  No results found for: ANATITER, REMY  RF:  No results found for: RF  DSDNA:  No components found for: DNA  AMYLASE:  No results found for: AMYLASE  LIPASE:  No results found for: LIPASE  Fibrinogen Level:  No components found for: FIB       BELOW MENTIONED RADIOLOGY STUDY RESULTS BY ME (AS NEEDED FOR MY EVALUATION AND MANAGEMENT).      XR CHEST (2 VW)    Result Date: 9/1/2022  EXAMINATION: TWO XRAY VIEWS OF THE CHEST 9/1/2022 7:11 am COMPARISON: 05/27/2022 HISTORY: ORDERING SYSTEM PROVIDED HISTORY: palpitations. no chest pain or fever or cough. longstanding dizziness TECHNOLOGIST PROVIDED HISTORY: Reason for exam:->palpitations. no chest pain or fever or cough. longstanding dizziness FINDINGS: Left-sided bipolar cardiac pacer is stable in configuration. The heart is enlarged with mild central pulmonary venous congestion there is no consolidation, pneumothorax or effusion. 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 Cardiomegaly with mild pulmonary venous congestion.

## 2022-09-14 NOTE — PROGRESS NOTES
Urology Progress Note  [facility]    Provider: TIFFANIE Villegas - CNP  Patient ID:  Admission Date: 9/10/2022 Name: Hansa Stock Date: 9/10/2022 MRN: [id number]   Patient Location: [id number] : 1940  Attending: Lacy Monk MD Date of Service: 2022  PCP: Leona Tena MD     Diagnoses:  1. SCHMID (dyspnea on exertion)     Gross Hematuria    Assessment/Plan:  No acute events overnight  CT a/p WO 2022  -bl small hyperdense cysts   -bl nephrolithiasis  Cr 1.3  PVR 1cc yesterday    Recommendations:  Need full hematuria workup with CT/Cysto/Cytology  Needs outpatient cystoscopy left RPG possible URS for full hematuria workup. Follow up x2-3 weeks requested, we will reach out to patient to set this up. Urology will sign out at this time, please call with any questions    The patient had a chance to ask questions which were answered. she understands the above plan. Subjective:   Dena Leslie is a 80 y.o. female. She was seen and examined this morning. Today we discussed flank pain and hematuyria f/u. Objective:   Vitals:  Vitals:    22 0815   BP: (!) 154/59   Pulse: 70   Resp:    Temp:    SpO2: 95%       Intake/Output Summary (Last 24 hours) at 2022 0958  Last data filed at 2022 0816  Gross per 24 hour   Intake --   Output 150 ml   Net -150 ml       Physical Exam:  Gen: Alert and oriented x3, no acute distress  CV: Regular rate   Resp: unlabored respirations  Abd: Soft, non-distended, non-tender, no masses  Ext: no peripheral edema noted, moves upper and lower extremities spontaneously  Skin: warmand well perfused, no rashes noted on the face, or arms.      Labs:  Lab Results   Component Value Date    WBC 7.4 2022    HGB 12.7 2022    HCT 40.9 2022    MCV 85.6 2022     2022     Lab Results   Component Value Date    CREATININE 1.3 (H) 2022    BUN 27 (H) 2022     2022    K 3.2 (L) 09/14/2022     09/14/2022    CO2 22 09/14/2022       TIFFANIE Montana - CNP   9/14/2022

## 2022-09-14 NOTE — PROGRESS NOTES
abdomen and pelvis showed few subcentimeter hypodense nodule in right and left kidney suggestive of hemorrhagic or proteinaceous cyst  Urology on board; plan for outpatient work-up of hematuria with CT/cystoscopy cytology    Chronic left flank pain likely secondary to nonobstructing stones.   Urology following plan for outpatient work-up  Continue as needed pain meds    Hypothyroidism: Continue Synthroid    CKD stage III: Creatinine 1.2 on admission; remained stable around 1.3  Nephrology consulted: Appreciate their recs  Avoid nephrotoxin, strict intake output, daily weights and monitor renal function closely        DVT prophylaxis: lovenox  Code:Full Code    Disposition: Once acute medical issues have resolved    Current Medications  sodium chloride flush 0.9 % injection 5-40 mL, PRN  acetylcysteine (MUCOMYST) 20 % solution 600 mg, BID  ascorbic acid (VITAMIN C) tablet 500 mg, TID  ziprasidone (GEODON) injection 20 mg, Once  potassium chloride (KLOR-CON M) extended release tablet 40 mEq, PRN   Or  potassium bicarb-citric acid (EFFER-K) effervescent tablet 40 mEq, PRN   Or  potassium chloride 10 mEq/100 mL IVPB (Peripheral Line), PRN  hydrALAZINE (APRESOLINE) injection 10 mg, Q6H PRN  albuterol sulfate HFA (PROVENTIL;VENTOLIN;PROAIR) 108 (90 Base) MCG/ACT inhaler 2 puff, 4x Daily PRN  aspirin EC tablet 81 mg, Daily  clopidogrel (PLAVIX) tablet 75 mg, Daily  fluticasone (FLONASE) 50 MCG/ACT nasal spray 2 spray, Daily  levothyroxine (SYNTHROID) tablet 125 mcg, Daily  HYDROcodone-acetaminophen (NORCO) 5-325 MG per tablet 1 tablet, 4x Daily PRN  metoprolol succinate (TOPROL XL) extended release tablet 12.5 mg, Daily  sodium chloride flush 0.9 % injection 5-40 mL, 2 times per day  sodium chloride flush 0.9 % injection 5-40 mL, PRN  0.9 % sodium chloride infusion, PRN  enoxaparin Sodium (LOVENOX) injection 30 mg, Daily  ondansetron (ZOFRAN-ODT) disintegrating tablet 4 mg, Q8H PRN  polyethylene glycol (GLYCOLAX) packet 17 g, Daily PRN  acetaminophen (TYLENOL) tablet 650 mg, Q6H PRN   Or  acetaminophen (TYLENOL) suppository 650 mg, Q6H PRN      Objective:  BP (!) 154/59   Pulse 70   Temp 98.2 °F (36.8 °C) (Oral)   Resp 16   Ht 5' 3\" (1.6 m)   Wt 113 lb (51.3 kg)   SpO2 95%   BMI 20.02 kg/m²     Intake/Output Summary (Last 24 hours) at 9/14/2022 0834  Last data filed at 9/14/2022 0816  Gross per 24 hour   Intake 480 ml   Output 150 ml   Net 330 ml        Wt Readings from Last 3 Encounters:   09/13/22 113 lb (51.3 kg)   09/06/22 120 lb (54.4 kg)   09/01/22 120 lb (54.4 kg)       Physical Examination:   General appearance:  No apparent distress, appears stated age and cooperative. HEENT: Normocephalic, sclera clear., PERRLA. Trachea midline, no adenopathy. Cardiovascular: Regular rate and rhythm, normal S1, S2. No murmur. Respiratory:Clear to auscultation bilaterally, no wheeze or crackles. GI: Abdomen soft, no tenderness, not distended, normal bowel sounds  Musculoskeletal: No cyanosis in digits. No BLE edema present  Neurology: CN 2-12 grossly intact. No speech or motor deficits  Psych: Not agitated, appropriate affect  Skin: Warm, dry, normal turgor    Labs and Tests:  CBC:   Recent Labs     09/12/22  0517 09/13/22  0458   WBC 8.9 8.5   HGB 13.6 12.9    310       BMP:    Recent Labs     09/12/22  0517 09/13/22  0458    140   K 3.7 4.1    109   CO2 23 18*   BUN 28* 30*   CREATININE 1.3* 1.2   GLUCOSE 94 90       Hepatic:   No results for input(s): AST, ALT, ALB, BILITOT, ALKPHOS in the last 72 hours. CT ABDOMEN PELVIS WO CONTRAST Additional Contrast? None   Final Result   No hydronephrosis noted.   A few subcentimeter hyperdense nodules are seen in   the right and left kidney, incompletely characterized on noncontrast study,   most likely hemorrhagic or proteinaceous cyst.  Simple appearing cysts are   seen in the right and left kidney      A few small calcifications are seen in the right and left kidney, likely a   combination of nonobstructing renal calculi or vascular calcifications. No obvious bladder calculi. No significant inflammatory stranding   surrounding the bladder. Tiny pleural effusions, suggesting mild fluid overload         XR CHEST PORTABLE   Final Result   Mild pulmonary vascular congestion. Stable cardial pericardial silhouette   enlargement. Otherwise unremarkable portable chest radiograph. Problem List  Principal Problem:    SCHMID (dyspnea on exertion)  Resolved Problems:    * No resolved hospital problems.  Perry Burdick MD   9/14/2022 8:34 AM

## 2022-09-14 NOTE — PROGRESS NOTES
[facility] Department   Phone: [phone]    Occupational Therapy    [x] Initial Evaluation            [] Daily Treatment Note         [] Discharge Summary      Patient: Marquita Ojeda   : [birth year]   MRN: [id number]   Date of Service:  2022    Admitting Diagnosis: SCHMID (dyspnea on exertion)  Current Admission Summary: Marquita Ojeda is a 80 y.o. female with past medical history of CAD, atrial fibrillation, CHF, hyperlipidemia, hypertension who presents to the ED with complaint of shortness of breath. Patient states has had increasing shortness of breath especially with exertion over the past couple of days. Patient states he is followed up with cardiology on the outpatient basis. Had stress test she states in the past month which is abnormal.  She is scheduled to have a cardiac catheterization/angiogram on Monday with Dr. Eliana Singer. Patient states she had increasing shortness of breath, exercise intolerance and weakness so came to the ED for further evaluation and treatment. Denies any chest pain or chest tightness. Denies cough, hemoptysis, pleuritic pain, orthopnea, pedal edema or calf tenderness. Denies fever or chills. Denies rashes or lesions. Denies abdominal pain, nausea/vomiting, urinary symptoms or changes in bowel movements.   Past Medical History:  has a past medical history of Allergic rhinitis, cause unspecified, Arthritis, Atrial fibrillation ([address] Bronchopneumonia, CAD (coronary artery disease), Cerebral artery occlusion with cerebral infarction ([address] Chronic gouty arthropathy, Chronic kidney disease, Congestive heart failure, unspecified, Degeneration of cervical intervertebral disc, Essential and other specified forms of tremor, Gout, HIGH CHOLESTEROL, History of CVA (cerebrovascular accident), Hx of blood clots, Hypertension, Influenza, Leakage of Watchman left atrial appendage closure device, Mitral valve stenosis and aortic valve and Medicaid options      If patient discharges prior to next session this note will serve as a discharge summary. Please see below for the latest assessment towards goals. DME Required For Discharge: DME to be determined pending patient progress  Precautions/Restrictions: high fall risk  Weight Bearing Restrictions: no restrictions  [] Right Upper Extremity        [] Left Upper Extremity         [] Right Lower Extremity         [] Left Lower Extremity             Required Braces/Orthotics: no braces required                   [] Right            [] Left  Positional Restrictions:no positional restrictions     Pre-Admission Information   Lives With: . Comment: 39 yo grandson \"Eddie\"                  Type of Home: house  Home Layout: one level  Home Access:  2 step to enter without rails  on porch, usually comes through garage w/o steps  Bathroom Layout: tub only  Bathroom Equipment: grab bars in shower, grab bars around toilet, shower chair, hand held shower head  Toilet Height: elevated height  Home Equipment: rollator - 4 wheeled walker, manual wheelchair, reacher  Transfer Assistance: Independent without use of device  Ambulation Assistance:modified independent with use of 4WW  at home, use electric cart within community  ADL Assistance: independent with all ADL's, requires assistance with dressing for socks  IADL Assistance: requires assistance with laundry, requires assistance with cleaning, requires assistance with yard work, requires assistance with driving/transportation  Active :        [] Yes                 [x] No  Hand Dominance: [] Left                 [] Right  Current Employment: retired.   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[place]: small dog, reading, watch TV  Recent Falls: 4-5 falls within 6 months due to lifting dog and tripping on rugs     Examination   Vision:   Vision Gross Assessment: Impaired and Vision Corrective Device: wears glasses for reading  Hearing:   hard of hearing, looking into hearing aides  Observation:   General Observation:  bruising on bilateral UE from recent falls  Sensation:   WFL  Tone:   Normotonic  ROM:   (B) UE AROM WFL  Strength:   (B) UE strength grossly WFL    Decision Making: low complexity  Clinical Presentation: stable      Subjective  General: Patient supine in bed, agreeable to evaluation  Pain: 0/10  Pain Interventions: not applicable        Activities of Daily Living  Basic Activities of Daily Living  Lower Extremity Dressing: minimal assistance Increased time to complete task . Comment: Patient's daughter assisted patient (baseline per pt and daughter with gym shoes)  Dressing Comments: Patient declined remainder of ADLs, anticipating procedure  General Comments: Patient sat EOB to complete dressing  Instrumental Activities of Daily Living  No IADL completed on this date. Functional Mobility  Bed Mobility  Supine to Sit: stand by assistance  Sit to Supine: stand by assistance  Comments:  Transfers  Sit to stand transfer:stand by assistance  Stand to sit transfer: stand by assistance  Bed / Chair comments: sit<>stand from EOB, no RW required for tx  Comments:  Functional Mobility:  Standing Balance: stand by assistance.     Functional Mobility: .  contact guard assistance  Functional Mobility Device Use: rolling walker  Functional Mobility Comment: ~100 feet, increased SOB, O2 remained WFL on RA (96%)    Other Therapeutic Interventions    Functional Outcomes  AM-PAC Inpatient Daily Activity Raw Score: 19    Cognition  WFL  Orientation:    alert and oriented x 4  Command Following:   [facility]     Education  Barriers To Learning: none  Patient Education: patient educated on goals, OT role and benefits, plan of care, discharge recommendations  Learning Assessment:  patient verbalizes and demonstrates understanding    Assessment  Activity Tolerance: Patient tolerated well  Impairments Requiring Therapeutic Intervention: decreased functional mobility, decreased ADL status, decreased endurance, decreased balance, decreased posture  Prognosis: good  Clinical Assessment: Patient presented with the above deficits, which are below baseline, and would benefit from continued skilled OT to address  Safety Interventions: patient left in bed, bed alarm in place, call light within reach, nurse notified, and family/caregiver present    Plan  Frequency: 3-5 x/per week  Current Treatment Recommendations: strengthening, balance training, functional mobility training, transfer training, endurance training, patient/caregiver education, ADL/self-care training, safety education, and equipment evaluation/education    Goals  Patient Goals:  To return home   Short Term Goals:  Time Frame: Upon discharge  Patient will complete lower body ADL at minimal assistance   Patient will complete toileting at stand by assistance   Patient will complete grooming at supervision   Patient will complete functional transfers at supervision   Patient will complete functional mobility at supervision   Patient will complete bed mobility at supervision     Therapy Session Time     Individual Group Co-treatment   Time In    1153   Time Out    1233   Minutes    40        Timed Code Treatment Minutes:   25  Total Treatment Minutes:  40       Electronically Signed By: LINDA Nicolas/CT [id number]

## 2022-09-14 NOTE — PROGRESS NOTES
via Cx   TTE 7/22 60% Watchman deated with leak   Plan   CTA today  If watchman not in contact with Cx artery, PCI of Cx  PCI of Cx may [facility] communicating branches with atrium  Continue asa, plavix   *AFIB  Status S/p watchman   Plan Per EP

## 2022-09-15 ENCOUNTER — APPOINTMENT (OUTPATIENT)
Dept: CT IMAGING | Age: 82
DRG: 286 | End: 2022-09-15
Payer: MEDICARE

## 2022-09-15 DIAGNOSIS — I25.10 CORONARY ARTERY DISEASE DUE TO LIPID RICH PLAQUE: Primary | ICD-10-CM

## 2022-09-15 DIAGNOSIS — R06.02 SHORTNESS OF BREATH: ICD-10-CM

## 2022-09-15 DIAGNOSIS — I25.83 CORONARY ARTERY DISEASE DUE TO LIPID RICH PLAQUE: Primary | ICD-10-CM

## 2022-09-15 LAB
ANION GAP SERPL CALCULATED.3IONS-SCNC: 13 MMOL/L (ref 3–16)
BASE EXCESS VENOUS: -6.7 MMOL/L (ref -3–3)
BASOPHILS ABSOLUTE: 0.1 K/UL (ref 0–0.2)
BASOPHILS RELATIVE PERCENT: 1.8 %
BLOOD CULTURE, ROUTINE: NORMAL
BUN BLDV-MCNC: 25 MG/DL (ref 7–20)
CALCIUM SERPL-MCNC: 9 MG/DL (ref 8.3–10.6)
CARBOXYHEMOGLOBIN: 6.2 % (ref 0–1.5)
CHLORIDE BLD-SCNC: 111 MMOL/L (ref 99–110)
CO2: 16 MMOL/L (ref 21–32)
CREAT SERPL-MCNC: 1.4 MG/DL (ref 0.6–1.2)
CULTURE, BLOOD 2: NORMAL
EKG ATRIAL RATE: 72 BPM
EKG DIAGNOSIS: NORMAL
EKG P AXIS: 28 DEGREES
EKG P-R INTERVAL: 208 MS
EKG Q-T INTERVAL: 402 MS
EKG QRS DURATION: 104 MS
EKG QTC CALCULATION (BAZETT): 440 MS
EKG R AXIS: 80 DEGREES
EKG T AXIS: 251 DEGREES
EKG VENTRICULAR RATE: 72 BPM
EOSINOPHILS ABSOLUTE: 0.4 K/UL (ref 0–0.6)
EOSINOPHILS RELATIVE PERCENT: 4.9 %
GFR AFRICAN AMERICAN: 44
GFR NON-AFRICAN AMERICAN: 36
GLUCOSE BLD-MCNC: 115 MG/DL (ref 70–99)
GLUCOSE BLD-MCNC: 138 MG/DL (ref 70–99)
GLUCOSE BLD-MCNC: 83 MG/DL (ref 70–99)
GLUCOSE BLD-MCNC: 88 MG/DL (ref 70–99)
GLUCOSE BLD-MCNC: 93 MG/DL (ref 70–99)
HCO3 VENOUS: 19.2 MMOL/L (ref 23–29)
HCT VFR BLD CALC: 43 % (ref 36–48)
HEMOGLOBIN, VEN, REDUCED: 3 %
HEMOGLOBIN: 13.2 G/DL (ref 12–16)
LYMPHOCYTES ABSOLUTE: 0.9 K/UL (ref 1–5.1)
LYMPHOCYTES RELATIVE PERCENT: 10.1 %
MCH RBC QN AUTO: 26.8 PG (ref 26–34)
MCHC RBC AUTO-ENTMCNC: 30.7 G/DL (ref 31–36)
MCV RBC AUTO: 87.1 FL (ref 80–100)
METHEMOGLOBIN VENOUS: 0 %
MONOCYTES ABSOLUTE: 0.5 K/UL (ref 0–1.3)
MONOCYTES RELATIVE PERCENT: 5.8 %
NEUTROPHILS ABSOLUTE: 6.5 K/UL (ref 1.7–7.7)
NEUTROPHILS RELATIVE PERCENT: 77.4 %
O2 CONTENT, VEN: 18 VOL %
O2 SAT, VEN: 97 %
O2 THERAPY: ABNORMAL
PCO2, VEN: 38.8 MMHG (ref 40–50)
PDW BLD-RTO: 20.4 % (ref 12.4–15.4)
PERFORMED ON: ABNORMAL
PERFORMED ON: ABNORMAL
PERFORMED ON: NORMAL
PERFORMED ON: NORMAL
PH VENOUS: 7.3 (ref 7.35–7.45)
PLATELET # BLD: 347 K/UL (ref 135–450)
PMV BLD AUTO: 9 FL (ref 5–10.5)
PO2, VEN: 89.2 MMHG (ref 25–40)
POTASSIUM REFLEX MAGNESIUM: 4 MMOL/L (ref 3.5–5.1)
RBC # BLD: 4.93 M/UL (ref 4–5.2)
SODIUM BLD-SCNC: 140 MMOL/L (ref 136–145)
TCO2 CALC VENOUS: 46 MMOL/L
WBC # BLD: 8.4 K/UL (ref 4–11)

## 2022-09-15 PROCEDURE — 6370000000 HC RX 637 (ALT 250 FOR IP): Performed by: INTERNAL MEDICINE

## 2022-09-15 PROCEDURE — 99233 SBSQ HOSP IP/OBS HIGH 50: CPT | Performed by: NURSE PRACTITIONER

## 2022-09-15 PROCEDURE — 75574 CT ANGIO HRT W/3D IMAGE: CPT

## 2022-09-15 PROCEDURE — 85025 COMPLETE CBC W/AUTO DIFF WBC: CPT

## 2022-09-15 PROCEDURE — 82803 BLOOD GASES ANY COMBINATION: CPT

## 2022-09-15 PROCEDURE — 2580000003 HC RX 258: Performed by: INTERNAL MEDICINE

## 2022-09-15 PROCEDURE — 2060000000 HC ICU INTERMEDIATE R&B

## 2022-09-15 PROCEDURE — 2500000003 HC RX 250 WO HCPCS: Performed by: INTERNAL MEDICINE

## 2022-09-15 PROCEDURE — 97116 GAIT TRAINING THERAPY: CPT

## 2022-09-15 PROCEDURE — 6360000004 HC RX CONTRAST MEDICATION: Performed by: INTERNAL MEDICINE

## 2022-09-15 PROCEDURE — 93010 ELECTROCARDIOGRAM REPORT: CPT | Performed by: INTERNAL MEDICINE

## 2022-09-15 PROCEDURE — 97530 THERAPEUTIC ACTIVITIES: CPT

## 2022-09-15 PROCEDURE — 80048 BASIC METABOLIC PNL TOTAL CA: CPT

## 2022-09-15 PROCEDURE — 36415 COLL VENOUS BLD VENIPUNCTURE: CPT

## 2022-09-15 PROCEDURE — 94680 O2 UPTK RST&XERS DIR SIMPLE: CPT

## 2022-09-15 RX ADMIN — Medication 10 ML: at 07:50

## 2022-09-15 RX ADMIN — FAMOTIDINE 20 MG: 20 TABLET ORAL at 07:49

## 2022-09-15 RX ADMIN — OXYCODONE HYDROCHLORIDE AND ACETAMINOPHEN 500 MG: 500 TABLET ORAL at 07:00

## 2022-09-15 RX ADMIN — Medication 10 ML: at 23:49

## 2022-09-15 RX ADMIN — SODIUM BICARBONATE: 84 INJECTION, SOLUTION INTRAVENOUS at 12:05

## 2022-09-15 RX ADMIN — ASPIRIN 81 MG: 81 TABLET, COATED ORAL at 07:47

## 2022-09-15 RX ADMIN — HYDROCODONE BITARTRATE AND ACETAMINOPHEN 1 TABLET: 5; 325 TABLET ORAL at 22:10

## 2022-09-15 RX ADMIN — IOPAMIDOL 100 ML: 755 INJECTION, SOLUTION INTRAVENOUS at 10:02

## 2022-09-15 RX ADMIN — FLUTICASONE PROPIONATE 2 SPRAY: 50 SPRAY, METERED NASAL at 07:48

## 2022-09-15 RX ADMIN — METOPROLOL SUCCINATE 12.5 MG: 25 TABLET, EXTENDED RELEASE ORAL at 07:47

## 2022-09-15 RX ADMIN — CLOPIDOGREL BISULFATE 75 MG: 75 TABLET ORAL at 07:47

## 2022-09-15 ASSESSMENT — PAIN SCALES - GENERAL
PAINLEVEL_OUTOF10: 0
PAINLEVEL_OUTOF10: 4
PAINLEVEL_OUTOF10: 0

## 2022-09-15 ASSESSMENT — PAIN DESCRIPTION - DESCRIPTORS: DESCRIPTORS: DISCOMFORT

## 2022-09-15 ASSESSMENT — PAIN DESCRIPTION - LOCATION: LOCATION: ARM

## 2022-09-15 NOTE — PROGRESS NOTES
Patient is A&O x 4. SBA to bathroom. IV's x 3 all flushed well and capped. Patient is currently sitting on the side of the bed awaiting transport to CT.

## 2022-09-15 NOTE — PROGRESS NOTES
09/15/22 1243   Resting (Room Air)   SpO2 99   HR 82   During Walk (Room Air)   SpO2 96   HR 77   Walk/Assistance Device Walker   Rate of Dyspnea 2   Symptoms Dizziness; Fatigue   After Walk   SpO2 98   HR 71   Does the Patient Qualify for Home O2 No   Does the Patient Need Portable Oxygen Tanks No

## 2022-09-15 NOTE — PROGRESS NOTES
[address]   Cardiology Progress Note     Date: 9/15/2022  Admit Date: 9/10/2022     Reason for consultation:     Chief Complaint   Patient presents with    Shortness of Breath    Atrial Fibrillation     Sent by dr Lainey Pabon to have angiogram on monday       History of Present Illness: History obtained from patient and medical record. Susan Umana is a 80 y.o. female who  apparently also got confused in the hospital.  She has chronic atrial  fibrillation. However, she is now status post Watchman device. She has  had TIA. He has chronic renal insufficiency with current creatinine  being 1.1. She has a systolic congestive heart failure. Ejection  fraction seemed improved to 45%. She has hypertension, hyperlipidemia,  hypothyroidism. She has longstanding coronary artery disease with  bypass graft surgery in the past.  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 She has had significant  peripheral vascular disease with left to right fem-fem bypass and left  found to have above-knee popliteal bypass. She is status post pacemaker  01/25/2021. She is experiencing worsening shortness of breath. She  recently had nuclear stress testing, which showed anterior ischemia and  now it has been scheduled for outpatient cardiac catheterization,  scheduled for tomorrow. However, she grew progressively short of breath  and the daughter felt it was best to bring her to the emergency room. In the emergency room, proBNP is significantly above her baseline at  7004. Troponins normal.  Creatinine 1.1. (per consult note)    Interval Hx: Today, she is feeling ok. No chest pain. Has SOB with activity. Getting cleaned up with therapy at time of visit. Patient seen and examined. Clinical notes reviewed. Telemetry reviewed. No new complaints today. No major events overnight.    Denies having chest pain, palpitations, shortness of breath, orthopnea/PND, cough, or dizziness at the Aspirin Nausea Only    Diltiazem Anaphylaxis    Diltiazem Hcl Anaphylaxis    Lorazepam Other (See Comments)     hallucinations  hallucinations  hallucinations    Sulfa Antibiotics Rash and Hives    Atorvastatin Other (See Comments)     Muscle pains  Muscle pains  Muscle pains    Dabigatran Nausea Only     And indigestion  And indigestion    Aka Pradaxa  And indigestion  And indigestion  And indigestion    Aka Pradaxa  And indigestion  And indigestion  And indigestion    Aka Pradaxa    Mysoline [Primidone]     Nsaids      Other reaction(s): Unknown    Other Other (See Comments)     Nitroglycerin patches causes severe headaches    Primidone      Other reaction(s): Unknown       Family History:  Reviewed. family history includes Cancer in her maternal grandmother, paternal grandmother, and sister; Diabetes in her brother and maternal uncle; Heart Disease in her brother, maternal aunt, and sister; Hypertension in her brother and paternal aunt; Stroke in her maternal aunt. Denies family history of sudden cardiac death, arrhythmia, premature CAD    Home Meds:  Prior to Visit Medications    Medication Sig Taking? Authorizing Provider   HYDROcodone-acetaminophen (NORCO) 5-325 MG per tablet Take 1 tablet by mouth 4 times daily as needed for Pain for up to 30 days. Lina Ramos MD   torsemide (DEMADEX) 10 MG tablet 20 mg 2 days of the week and 10 mg the rest  Tory Mcgovern, APRN - CNS   albuterol sulfate HFA (VENTOLIN HFA) 108 (90 Base) MCG/ACT inhaler Inhale 2 puffs into the lungs 4 times daily as needed for Wheezing  Lina Ramos MD   vitamin D (ERGOCALCIFEROL) 1.25 MG (91178 UT) CAPS capsule TAKE 1 CAPSULE ONCE A WEEK  Lina Ramos MD   aspirin EC 81 MG EC tablet Take 1 tablet by mouth in the morning. Brayan Casas MD   clopidogrel (PLAVIX) 75 MG tablet Take 1 tablet by mouth in the morning.   Brayan Casas MD   allopurinol (ZYLOPRIM) 100 MG tablet TAKE 1 TABLET EVERY DAY  Lina Ramos MD levothyroxine (SYNTHROID) 125 MCG tablet Take 1 tablet by mouth Daily  Tanya Lubin MD   topiramate (TOPAMAX) 50 MG tablet TAKE 2 TABLETS TWICE DAILY  Tanya Lubin MD   nitroGLYCERIN (NITROLINGUAL) 0.4 MG/SPRAY 0.4 mg spray Place 1 spray under the tongue every 5 minutes as needed for Chest pain  Tanya Lubin MD   fluticasone (FLONASE) 50 MCG/ACT nasal spray 2 sprays by Each Nostril route daily  TIFFANIE Canseco CNP   metoprolol succinate (TOPROL XL) 25 MG extended release tablet Take 0.5 tablets by mouth daily . 5 tablet daily  TIFFANIE Zamora CNP        Scheduled Meds:   famotidine  20 mg Oral QAM AC    insulin lispro  0-8 Units SubCUTAneous TID WC    insulin lispro  0-4 Units SubCUTAneous Nightly    ondansetron  4 mg IntraVENous Once    acetylcysteine  600 mg Oral BID    ascorbic acid  500 mg Oral TID    ziprasidone  20 mg IntraMUSCular Once    aspirin EC  81 mg Oral Daily    clopidogrel  75 mg Oral Daily    fluticasone  2 spray Each Nostril Daily    levothyroxine  125 mcg Oral Daily    metoprolol succinate  12.5 mg Oral Daily    sodium chloride flush  5-40 mL IntraVENous 2 times per day    enoxaparin  30 mg SubCUTAneous Daily     Continuous Infusions:   dextrose      sodium chloride       PRN Meds:glucose, dextrose bolus **OR** dextrose bolus, glucagon (rDNA), dextrose, sodium chloride flush, [DISCONTINUED] potassium chloride **OR** [DISCONTINUED] potassium alternative oral replacement **OR** potassium chloride, hydrALAZINE, albuterol sulfate HFA, HYDROcodone-acetaminophen, sodium chloride flush, sodium chloride, ondansetron **OR** [DISCONTINUED] ondansetron, polyethylene glycol, acetaminophen **OR** acetaminophen     Review of Systems:  Constitutional: Negative for fever, night sweats, chills,  Skin: Negative for rash, pruritus, bleeding, or bruising    HEENT: Negative for vision changes, ringing in the ears, dysphagia  Respiratory: Reviewed in HPI  Cardiovascular: Reviewed in HPI  Gastrointestinal: Negative for abdominal pain, N/V/D, constipation, or black/tarry stools  Genito-Urinary: Negative for dysuria, incontinence, or hematuria  Musculoskeletal: Negative for joint swelling, muscle pain, or injuries  Neurological/Psych: Negative for confusion, seizures, headaches, or TIA-like symptoms. No anxiety or depression. Physical Examination:  Vitals:    09/15/22 0747   BP: 134/67   Pulse: 71   Resp:    Temp:    SpO2:       In: 480 [P.O.:480]  Out: 150    Wt Readings from Last 3 Encounters:   09/15/22 113 lb (51.3 kg)   09/06/22 120 lb (54.4 kg)   09/01/22 120 lb (54.4 kg)       Intake/Output Summary (Last 24 hours) at 9/15/2022 1034  Last data filed at 9/15/2022 0956  Gross per 24 hour   Intake 480 ml   Output --   Net 480 ml       Telemetry: Personally Reviewed  - Sinus rhythm   Constitutional: Cooperative and in no apparent distress, and appears well nourished  Skin: Warm and pink; no pallor, cyanosis, clubbing, +scattered bruising. R groin stable. HEENT: Symmetric and normocephalic. PERRL. Conjunctiva pink with clear sclera. Mucus membranes moist.   Cardiovascular: Regular rate and rhythm. S1/S2 present without murmurs, no rubs or gallops. Peripheral pulses 2+, capillary refill < 3 seconds. No elevation of JVP. No peripheral edema  Respiratory: Respirations symmetric and unlabored. Lungs clear to auscultation bilaterally, no wheezing, crackles, or rhonchi  Gastrointestinal: Abdomen soft and round. Bowel sounds active without tenderness. Musculoskeletal: Bilateral upper and lower extremity strength with generalized weakness   Neurologic/Psych: Awake and orientated to person, place and time.  Calm affect, appropriate mood    Pertinent labs, diagnostic, device, and imaging results reviewed as a part of this visit    Labs:    BMP:   Recent Labs     09/13/22  0458 09/14/22  0808 09/15/22  0547    139 140   K 4.1 3.2* 4.0    108 111*   CO2 18* 22 16*   BUN 30* 27* 25* CREATININE 1.2 1.3* 1.4*     Estimated Creatinine Clearance: 26 mL/min (A) (based on SCr of 1.4 mg/dL (H)). CBC:   Recent Labs     22  0458 22  0808 09/15/22  0547   WBC 8.5 7.4 8.4   HGB 12.9 12.7 13.2   HCT 41.8 40.9 43.0   MCV 86.2 85.6 87.1    303 347     Thyroid:   Lab Results   Component Value Date/Time    TSH 0.40 2022 02:12 AM    R6GWBBH 0.89 2022 12:15 PM    U3FVBNY 7.3 10/03/2018 02:41 PM     Lipids:   Lab Results   Component Value Date/Time    CHOL 135 2022 04:22 AM    HDL 27 2022 04:22 AM    HDL 31 2012 08:49 AM    TRIG 227 2022 04:22 AM     LFTS:   Lab Results   Component Value Date/Time    ALT 9 2022 02:12 AM    AST 19 2022 02:12 AM    ALKPHOS 110 2022 02:12 AM    PROT 6.9 2022 02:12 AM    PROT 7.1 10/19/2012 01:06 PM    AGRATIO 1.4 2022 02:12 AM    BILITOT 0.6 2022 02:12 AM    BILITOT 0.4 2019 10:46 AM     Cardiac Enzymes:   Lab Results   Component Value Date/Time    CKTOTAL 28 2017 11:04 AM    TROPONINI <0.01 2022 07:11 PM    TROPONINI <0.01 2022 04:50 PM    TROPONINI <0.01 09/10/2022 09:07 PM     Coags:   Lab Results   Component Value Date/Time    PROTIME 13.5 2022 02:16 PM    INR 1.19 2022 02:16 PM       EC22  Electronic atrial pacemaker  Left ventricular hypertrophy with repolarization abnormality  Cannot rule out Septal infarct (cited on or before 10-SEP-2022)  Abnormal ECG  When compared with ECG of 13-SEP-2022 16:43,  Electronic atrial pacemaker has replaced Sinus rhythm    DONA: 7/20/22  Summary   Normal left ventricle size, wall thickness, and systolic function with an   estimated ejection fraction of 55-60%. Moderate mitral regurgitation is present. Left atrial size is dilated. There is a Watchman device seated with a feliciano-device leak measured at 3 mm   on the anterior side. No thrombus seen. Moderate tricuspid regurgitation.    Systolic pulmonary artery pressure (SPAP) estimated at 41 mmHg (RA pressure   3 mmHg), consistent with mild pulmonary hypertension. Stress Test: 8/30/22  Summary    Small-medium sized anterolateral partial reversibility defect of moderate    intensity consistent with ischemia and infarction in the territory of the    mid and distal LAD and/or LCx . 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 L-LAD patent   LVEDP NA   LVG NA      RHC:  RA RV PA FANNIE WP TCO TCI JODI long-term RA% PA%   2 30/3 35/15 23 JULIOCESAR 4.5 3.0 4.7 3.1 70 67%      Intervention(s)  None     Post Cath Dx:       CAD as above  Consider PCI of Cx in future if Cx proper geographically  from watchman device on CTC    Cardiac CTA: 9/15/22  FINDINGS:   Left atrium:       Watch man device is seen. There is only a small amount of thrombus in the   watch man device. .       The left atrial appendage is not thrombosed. Contrast is seen in the left   atrial appendage, compatible with leakage. .       On axial images 18. There is a crescentic area of contrast flowing around   the watch man device superiorly in the left atrial appendage. Cinthya Bloodgood However, no   definite fistulous tract is seen connecting this to the left atrium. Circumflex coronary artery abuts the inferior aspect of the watch man device. There is severe calcified and noncalcified plaque seen in the circumflex. A   definite fistulous tract between the circumflex and the left atrial appendage   is not clearly identified by CT. Cinthya Bloodgood        Native right coronary artery and left anterior descending coronary artery   heavily calcified. Mediastinum: Heart is enlarged. Thyroid gland unremarkable. Streak artifact   is seen from pacer. Ascending aorta measures 3.5 cm. No intimal flap is seen. No pericardial   effusion. Small hiatal hernia seen. No central pulmonary embolus   identified. Small mediastinal and hilar nodes are noted. Right hilar [place]   conglomerate, measures 1.9 cm x 2.4 cm. .  Calcification or clip seen in the   region of mitral valve. Lungs/Pleura: Respiratory motion artifact limits evaluation of fine pulmonary   parenchymal change. Mild underlying emphysema is seen. De pendant opacity   is seen at the lung bases, likely atelectasis. Pleural calcifications seen   on the left. Right hilar nodes are seen, similar compared to conventional chest CT August 2020       Upper Abdomen: Low attenuation is seen liver, compatible with fatty   infiltration. Soft Tissues/Bones: No acute bone or soft tissue abnormality. Impression   Circumflex coronary artery abuts the inferior aspect of the watch man device. No definite fistulous tract seen between the circumflex coronary artery and   the left atrial appendage       On axial images 18, there is a crescentic area of contrast flowing around the   watch man device in the left atrial appendage. Wendie Red However, no definite   fistulous tract is seen connecting this to the left atrium to account for   leakage. .. The left atrial appendage is not thrombosed.        Severe multivessel coronary artery disease       Problem List:   Patient Active Problem List    Diagnosis Date Noted    SCHMID (dyspnea on exertion) 09/10/2022    CKD (chronic kidney disease) stage 4, GFR 15-29 ml/min ([facility]) 06/28/2022    Chronic right sacroiliac pain 05/09/2022    Postcholecystectomy diarrhea 05/09/2022    Presence of Watchman left atrial appendage closure device 05/17/2021    Symptomatic bradycardia     Pacemaker lead failure     Memory loss due to medical condition 09/09/2020    Bilateral sensorineural hearing loss 07/15/2020    Iron deficiency anemia 04/30/2020    Anemia 04/30/2020    Peripheral vertigo, bilateral     PAF (paroxysmal atrial fibrillation) ([address]     Dyslipidemia     Dizziness 02/06/2020    Ischemic cardiomyopathy 12/04/2019    Idiopathic peripheral neuropathy 11/22/2019    Atherosclerosis of native arteries of extremities with intermittent claudication, bilateral legs (HCC)     Stage 3 chronic kidney disease ([address] 07/22/2019    PAD (peripheral artery disease) ([address] 07/22/2019    Chronic systolic heart failure ([address] 12/28/2018    Age related osteoporosis 05/01/2018    Chronic total occlusion of native coronary artery 04/12/2018    Tremor 11/08/2017    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/10/2017    Vitamin D deficiency 08/10/2017    PAT (paroxysmal atrial tachycardia) ([address] 08/08/2017    Pacemaker 04/24/2017    Cerebrovascular accident (CVA) ([address]     HTN (hypertension), benign     Fibrocystic breast 03/03/2017    Allergic rhinitis 05/26/2015    Sick sinus syndrome ([address] 11/19/2013    COPD (chronic obstructive pulmonary disease) ([address] 02/14/2013    Restrictive lung disease 02/14/2013    Non-rheumatic mitral regurgitation 07/20/2012    Chronic gouty arthropathy     Acquired hypothyroidism     Mixed hyperlipidemia 05/16/2011        Assessment and Plan:     Acute diastolic CHF   ~ diuresed with lasix. Now appears compensated. 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 No definite fistulous tract seen between the circumflex coronary artery and   the left atrial appendage   ~ plan for repeat LHC in 30 days to reevaluate fistula/ evaluate evolution.  If stagnant, will attempt PCI. ~ continue aspirin and Plavix     CKD  ~ per nephology     UTI  ~ on abx per primary     PPM 1/2021  Hx of TIA  HTN  HLD  PAD s/p bypass     Multiple medical conditions with risk of decompensation. All pertinent information and plan of care discussed with the physician. All questions and concerns were addressed to the patient. Alternatives to my treatment were discussed. I have discussed the above stated plan with patient and the nurse. The patient verbalized understanding and agreed with the plan. Thank you for allowing to us to participate in the care of Jessica Arrietacolleen. Total visit time > 35 minutes; > 50% spend counseling / coordinating care. I reviewed interval history, physical exam, review of data including labs, imaging, development and implementation of treatment plan and coordination of complex care. Counseled on risk factor modifications. Jas MORENO-CNP  [facility]   Office: [phone]    NOTE:  This report was transcribed using voice recognition software. Every effort was made to ensure accuracy; however, inadvertent computerized transcription errors may be present.

## 2022-09-15 NOTE — PROGRESS NOTES
growth to date  Antibiotic discontinued    Hematuria: CT abdomen and pelvis showed few subcentimeter hypodense nodule in right and left kidney suggestive of hemorrhagic or proteinaceous cyst  Urology on board; plan for outpatient work-up of hematuria with CT/cystoscopy cytology    Chronic left flank pain likely secondary to nonobstructing stones.   Urology following plan for outpatient work-up  Continue as needed pain meds    Hypothyroidism: Continue Synthroid    CKD stage III: Creatinine 1.2 on admission; remained stable around 1.3  Nephrology consulted: Appreciate their recs  Avoid nephrotoxin, strict intake output, daily weights and monitor renal function closely        DVT prophylaxis: lovenox  Code:Full Code    Disposition: Once acute medical issues have resolved    Current Medications  sodium bicarbonate 50 mEq in dextrose 5 % 1,000 mL infusion, Continuous  famotidine (PEPCID) tablet 20 mg, QAM AC  insulin lispro (HUMALOG) injection vial 0-8 Units, TID WC  insulin lispro (HUMALOG) injection vial 0-4 Units, Nightly  glucose-vitamin C chewable tablet 4 tablet, PRN  dextrose bolus 10% 125 mL, PRN   Or  dextrose bolus 10% 250 mL, PRN  glucagon (rDNA) injection 1 mg, PRN  dextrose 10 % infusion, Continuous PRN  ondansetron (ZOFRAN) injection 4 mg, Once  sodium chloride flush 0.9 % injection 5-40 mL, PRN  acetylcysteine (MUCOMYST) 20 % solution 600 mg, BID  ascorbic acid (VITAMIN C) tablet 500 mg, TID  ziprasidone (GEODON) injection 20 mg, Once  potassium chloride 10 mEq/100 mL IVPB (Peripheral Line), PRN  hydrALAZINE (APRESOLINE) injection 10 mg, Q6H PRN  albuterol sulfate HFA (PROVENTIL;VENTOLIN;PROAIR) 108 (90 Base) MCG/ACT inhaler 2 puff, 4x Daily PRN  aspirin EC tablet 81 mg, Daily  clopidogrel (PLAVIX) tablet 75 mg, Daily  fluticasone (FLONASE) 50 MCG/ACT nasal spray 2 spray, Daily  levothyroxine (SYNTHROID) tablet 125 mcg, Daily  HYDROcodone-acetaminophen (NORCO) 5-325 MG per tablet 1 tablet, 4x Daily PRN  metoprolol succinate (TOPROL XL) extended release tablet 12.5 mg, Daily  sodium chloride flush 0.9 % injection 5-40 mL, 2 times per day  sodium chloride flush 0.9 % injection 5-40 mL, PRN  0.9 % sodium chloride infusion, PRN  enoxaparin Sodium (LOVENOX) injection 30 mg, Daily  ondansetron (ZOFRAN-ODT) disintegrating tablet 4 mg, Q8H PRN  polyethylene glycol (GLYCOLAX) packet 17 g, Daily PRN  acetaminophen (TYLENOL) tablet 650 mg, Q6H PRN   Or  acetaminophen (TYLENOL) suppository 650 mg, Q6H PRN      Objective:  /67   Pulse 71   Temp 98 °F (36.7 °C) (Oral)   Resp 15   Ht 5' 3\" (1.6 m)   Wt 113 lb (51.3 kg)   SpO2 96%   BMI 20.02 kg/m²     Intake/Output Summary (Last 24 hours) at 9/15/2022 1351  Last data filed at 9/15/2022 0956  Gross per 24 hour   Intake 480 ml   Output --   Net 480 ml        Wt Readings from Last 3 Encounters:   09/15/22 113 lb (51.3 kg)   09/06/22 120 lb (54.4 kg)   09/01/22 120 lb (54.4 kg)       Physical Examination:   General appearance:  No apparent distress, appears stated age and cooperative. HEENT: Normocephalic, sclera clear., PERRLA. Trachea midline, no adenopathy. Cardiovascular: Regular rate and rhythm, normal S1, S2. No murmur. Respiratory:Clear to auscultation bilaterally, no wheeze or crackles. GI: Abdomen soft, no tenderness, not distended, normal bowel sounds  Musculoskeletal: No cyanosis in digits. No BLE edema present  Neurology: CN 2-12 grossly intact.  No speech or motor deficits  Psych: Not agitated, appropriate affect  Skin: Warm, dry, normal turgor    Labs and Tests:  CBC:   Recent Labs     09/13/22 0458 09/14/22  0808 09/15/22  0547   WBC 8.5 7.4 8.4   HGB 12.9 12.7 13.2    303 347       BMP:    Recent Labs     09/13/22 0458 09/14/22  0808 09/15/22  0547    139 140   K 4.1 3.2* 4.0    108 111*   CO2 18* 22 16*   BUN 30* 27* 25*   CREATININE 1.2 1.3* 1.4*   GLUCOSE 90 93 88       Hepatic:   No results for input(s): AST, ALT, ALB, BILITOT, ALKPHOS in the last 72 hours. CTA CARDIAC W C STRC MORP W CONTRAST   Final Result   Circumflex coronary artery abuts the inferior aspect of the watch man device. No definite fistulous tract seen between the circumflex coronary artery and   the left atrial appendage      On axial images 18, there is a crescentic area of contrast flowing around the   watch man device in the left atrial appendage. Lawernce Roughen However, no definite   fistulous tract is seen connecting this to the left atrium to account for   leakage. .. The left atrial appendage is not thrombosed. Severe multivessel coronary artery disease         CT ABDOMEN PELVIS WO CONTRAST Additional Contrast? None   Final Result   No hydronephrosis noted. A few subcentimeter hyperdense nodules are seen in   the right and left kidney, incompletely characterized on noncontrast study,   most likely hemorrhagic or proteinaceous cyst.  Simple appearing cysts are   seen in the right and left kidney      A few small calcifications are seen in the right and left kidney, likely a   combination of nonobstructing renal calculi or vascular calcifications. No obvious bladder calculi. No significant inflammatory stranding   surrounding the bladder. Tiny pleural effusions, suggesting mild fluid overload         XR CHEST PORTABLE   Final Result   Mild pulmonary vascular congestion. Stable cardial pericardial silhouette   enlargement. Otherwise unremarkable portable chest radiograph. Problem List  Principal Problem:    SCHMID (dyspnea on exertion)  Resolved Problems:    * No resolved hospital problems.  Cynthia Toscano MD   9/15/2022 1:51 PM

## 2022-09-15 NOTE — PROGRESS NOTES
[facility] [facility]   Phone: [phone]    Physical Therapy    [] Initial Evaluation            [x] Daily Treatment Note         [] Discharge Summary      Patient: Kareem Nation   : [id number]   MRN: [id number]   Date of Service:  9/15/2022  Admitting Diagnosis: SCHMID (dyspnea on exertion)  Current Admission Summary: Kareem Nation is a 80 y.o. female with past medical history of CAD, atrial fibrillation, CHF, hyperlipidemia, hypertension who presents to the ED with complaint of shortness of breath. Patient states has had increasing shortness of breath especially with exertion over the past couple of days. Patient states he is followed up with cardiology on the outpatient basis. Had stress test she states in the past month which is abnormal.  She is scheduled to have a cardiac catheterization/angiogram on Monday with Dr. Ramin Devine. Patient states she had increasing shortness of breath, exercise intolerance and weakness so came to the ED for further evaluation and treatment. Denies any chest pain or chest tightness. Denies cough, hemoptysis, pleuritic pain, orthopnea, pedal edema or calf tenderness. Denies fever or chills. Denies rashes or lesions. Denies abdominal pain, nausea/vomiting, urinary symptoms or changes in bowel movements.   Past Medical History:  has a past medical history of Allergic rhinitis, cause unspecified, Arthritis, Atrial fibrillation ([address] Bronchopneumonia, CAD (coronary artery disease), Cerebral artery occlusion with cerebral infarction ([address] Chronic gouty arthropathy, Chronic kidney disease, Congestive heart failure, unspecified, Degeneration of cervical intervertebral disc, Essential and other specified forms of tremor, Gout, HIGH CHOLESTEROL, History of CVA (cerebrovascular accident), Hx of blood clots, Hypertension, Influenza, Leakage of Watchman left atrial appendage closure device, Mitral valve stenosis and aortic valve insufficiency, Movement disorder, Pacemaker, Peptic ulcer, unspecified site, unspecified as acute or chronic, without mention of hemorrhage, perforation, or obstruction, Thyroid disease, Unspecified disorder of kidney and ureter, and Unspecified hypothyroidism. Past Surgical History:  has a past surgical history that includes back surgery; Cholecystectomy; Cardiac surgery; Coronary artery bypass graft (1987); shoulder surgery; Cardiac catheterization (7/2012); hip surgery (Left, 03/16/2017); joint replacement; Cardiac catheterization (08/07/2018); Percutaneous Transluminal Coronary Angio (11/04/2014); pr njx aa&/strd tfrml epi lumbar/sacral 1 level (Right, 12/3/2018); Femoral-femoral Bypass Graft (N/A, 8/22/2019); femoral bypass (Left, 8/22/2019); and IR KYPHOPLASTY LUMBAR 1 VERTEBRAL BODY (5/14/2021). Discharge Recommendations: Isiah Oconnell scored a 19/24 on the AM-PAC short mobility form. Current research shows that an AM-PAC score of 18 or greater is typically associated with a discharge to the patient's home setting. Based on the patient's AM-PAC score and their current functional mobility deficits, it is recommended that the patient have 2-3 sessions per week of Physical Therapy at d/c to increase the patient's independence. At this time, this patient demonstrates the endurance and safety to discharge home with HHPT (home vs OP services) and a follow up treatment frequency of 2-3x/wk. Please see assessment section for further patient specific details.     HOME HEALTH CARE: LEVEL 3 SAFETY     - Initial home health evaluation to occur within 24-48 hours, in patient home   - Therapy evaluations in home within 24-48 hours of discharge; including DME and home safety   - Frontload therapy 5 days, then 3x a week   - Therapy to evaluate if patient has [address] needs for personal care   -  evaluation within 24-48 hours, includes evaluation of resources and insurance to determine [place], [place], LTC, and Medicaid options     If patient discharges prior to next session this note will serve as a discharge summary. Please see below for the latest assessment towards goals. DME Required For Discharge: DME to be determined pending patient progress  Precautions/Restrictions: high fall risk  Weight Bearing Restrictions: no restrictions  [] Right Upper Extremity  [] Left Upper Extremity [] Right Lower Extremity  [] Left Lower Extremity     Required Braces/Orthotics: no braces required   [] Right  [] Left  Positional Restrictions:no positional restrictions    Pre-Admission Information   Lives With: . Comment: 39 yo grandson \"Eddie\"     Type of Home: house  Home Layout: one level  Home Access:  2 step to enter without rails  on porch, usually comes through garage w/o steps  Bathroom Layout: tub only  Bathroom Equipment: grab bars in shower, grab bars around toilet, shower chair, hand held shower head  Toilet Height: elevated height  Home Equipment: rollator - 4 wheeled walker, manual wheelchair, reacher  Transfer Assistance: Independent without use of device  Ambulation Assistance:modified independent with use of 4WW  at home, use electric cart within community  ADL Assistance: independent with all ADL's, requires assistance with dressing for socks  IADL Assistance: requires assistance with laundry, requires assistance with cleaning, requires assistance with yard work, requires assistance with driving/transportation  Active :        [] Yes  [x] No  Hand Dominance: [] Left  [] Right  Current Employment: retired. Occupation: administrative work at [employer]: small dog, reading, watch TV  Recent Falls: 4-5 falls within 6 months due to lifting dog and tripping on rugs      Subjective  General: Patient sitting in recliner chair upon arrival with daughter present in room. Patient is agreeable to PT.   Pain: 0/10  Pain Interventions: not applicable       Functional Mobility  Bed Mobility  Sit to Supine: stand by assistance  Scooting: stand by assistance  Comments: Patient performed with HOB elevated and use of handrails. Transfers  Sit to stand transfer: stand by assistance  Stand to sit transfer: stand by assistance  Comments: Patient performed sit to stand from recliner chair and EOB 2x with RW placed in front. Ambulation  Surface:level surface  Assistive Device: rolling walker  Assistance: contact guard assistance  Distance: 100 ft  Gait Mechanics: narrow MICHELE, decreased toe clearance, decreased hip flex/extension, decreased step length  Comments:  Patient ambulated from EOB to hallway with RW, required on standing rest break secondary to SOB. Stair Mobility  Stair mobility not completed on this date. Comments:  Wheelchair Mobility:  No w/c mobility completed on this date. Comments:  Balance  Static Sitting Balance: good: independent with functional balance in unsupported position  Dynamic Sitting Balance: good: independent with functional balance in unsupported position  Static Standing Balance: fair (+): maintains balance at SBA/supervision without use of UE support  Dynamic Standing Balance: fair (-): maintains balance at CGA with use of UE support  Comments: Patient sat edge of recliner chair while performing sponge bath for ~20-30. Patient's doctor came in to communicate with patient for an additional ~10 minutes with patient sitting edge of recliner chair.      Other Therapeutic Interventions  Sponge bath, gown donning/doffing    Functional Outcomes  AM-PAC Inpatient Mobility Raw Score : 19              Cognition  WFL  Orientation:    alert and oriented x 4  Command Following:   [facility]    Education  Barriers To Learning: none  Patient Education: patient educated on goals, PT role and benefits, plan of care, general safety, functional mobility training, family education, transfer training, discharge recommendations  Learning Assessment:  patient verbalizes and demonstrates understanding    Assessment  Activity Tolerance: Fair; patient continues to be limited by SOB during ambulation requiring standing rest break  Impairments Requiring Therapeutic Intervention: decreased functional mobility, decreased ADL status, decreased strength, decreased endurance, decreased balance  Prognosis: good  Clinical Assessment: Patient continues to present just below baseline function secondary to SOB present with activity. Patient ambulated ~100 feet with RW this visit requiring one standing rest break due to decreased endurance.  Patient will continue to benefit from skilled PT in order to return to [facility] with all functional mobility prior to d/c from the hospital.   Safety Interventions: patient left in bed, bed alarm in place, call light within reach, gait belt, patient at risk for falls, nurse notified, and family/caregiver present    Plan  Frequency: 3-5 x/per week  Current Treatment Recommendations: strengthening, balance training, functional mobility training, gait training, stair training, endurance training, patient/caregiver education, ADL/self-care training, home exercise program, and safety education    Goals  Patient Goals: to return home   Short Term Goals:  Time Frame: upon d/c  Patient will complete transfers at supervision   Patient will ambulate 150 ft with use of LRAD at stand by assistance  Patient will ascend/descend 2 stairs without use of HR at contact guard assistance  9/15: NO GOALS MET THIS VISIT    Therapy Session Time      Individual Group Co-treatment   Time In 1432       Time Out 1525       Minutes 53         Timed Code Treatment Minutes:  53 Minutes  Total Treatment Minutes:  53 Minutes     Electronically Signed By: Ed Bailey, PT  Ed Bailey PT, DPT, [id number]

## 2022-09-15 NOTE — PROGRESS NOTES
MD Domitila Kemp MD Lovett Cordial, MD               Office: [phone]                      Fax: [phone]             [facility]                   NEPHROLOGY INPATIENT PROGRESS NOTE:     PATIENT NAME: Rommel Morse  : 1940  MRN: [id number]      RECOMMENDATIONS:   -no signs of CI-GIANA after [address]  -Hold further diuresis - as plans for CTA    - K repletion  - slow HCO3 infusion for 12 hours. Check VBG. -Given IV Lasix with good response  -Was on torsemide 10/20-alternative days - hold for now  -Was on Aldactone, hold for now    - on CT -  A few subcentimeter hyperdense nodules are seen in  the right and left kidney, incompletely characterized on noncontrast study,  most likely hemorrhagic or proteinaceous cyst.  Simple appearing cysts are  seen in the right and left kidney    ->> Urology is already following - for gross hematuria, plans for CT/cysto/cyto = as outpt     -urine culture neg    D/C plan from renal stand point: stable   -Follows with Dr. Ignacio Him, next appointment on 2022    Discussed with patient, her daughter, team-hospitalist Dr Jacqueline Duncan:       Admitted for:  SCHMID (dyspnea on exertion) [R06.00]  Exertional dyspnea [R06.00]      Non-proteinuric CKD: 3B   - BL Scr-lowest recently about 1.2-1.3, same on admission-   -Thought to be due to hypertension, cardiorenal physiology  -Follows with me in the office      Associated problems:   Acute on chronic-systolic heart failure-  CXR - Cardiomegaly with mild pulmonary venous congestion.    Weight in past around 116-119 pounds, currently 113lbs      S/P C 22 Dr Darylene Frederic   IV contrast 61 cc  No LVEDP check       - Volume status: euvolemic  : BP: no need for tight control   : Na: controlled    - Azotemia: Prerenal  - Electrolytes: K: Hypokalemia  - Acid-Base: Acidosis, non-anion gap, metabolic  - Anemia: none       Other major problems: Management per primary and other consulting teams. Paroxysmal A. Fib          Hospital Problems             Last Modified POA    * (Principal) SCHMID (dyspnea on exertion) 9/12/2022 Yes           Bishop Schwarz MD,  [facility] [address]: [phone] or [employer]  Fax: [phone]    =======================================================================================   =======================================================================================  Subjective / interval history:   No complaints. No diarrhea    Past medical, Surgical, Social, Family medical history reviewed by me. MEDICATIONS: reviewed by me. Medications Prior to Admission:  No current facility-administered medications on file prior to encounter. Current Outpatient Medications on File Prior to Encounter   Medication Sig Dispense Refill    HYDROcodone-acetaminophen (NORCO) 5-325 MG per tablet Take 1 tablet by mouth 4 times daily as needed for Pain for up to 30 days. 120 tablet 0    torsemide (DEMADEX) 10 MG tablet 20 mg 2 days of the week and 10 mg the rest 135 tablet 1    albuterol sulfate HFA (VENTOLIN HFA) 108 (90 Base) MCG/ACT inhaler Inhale 2 puffs into the lungs 4 times daily as needed for Wheezing 18 g 0    vitamin D (ERGOCALCIFEROL) 1.25 MG (91960 UT) CAPS capsule TAKE 1 CAPSULE ONCE A WEEK 12 capsule 1    aspirin EC 81 MG EC tablet Take 1 tablet by mouth in the morning. 90 tablet 1    clopidogrel (PLAVIX) 75 MG tablet Take 1 tablet by mouth in the morning.  30 tablet 3    allopurinol (ZYLOPRIM) 100 MG tablet TAKE 1 TABLET EVERY DAY 90 tablet 1    levothyroxine (SYNTHROID) 125 MCG tablet Take 1 tablet by mouth Daily 90 tablet 1    topiramate (TOPAMAX) 50 MG tablet TAKE 2 TABLETS TWICE DAILY 360 tablet 1    nitroGLYCERIN (NITROLINGUAL) 0.4 MG/SPRAY 0.4 mg spray Place 1 spray under the tongue every 5 minutes as needed for Chest pain 4.9 g 1    fluticasone (FLONASE) 50 MCG/ACT nasal spray 2 sprays by Each Nostril route daily 16 g 0    metoprolol succinate (TOPROL XL) 25 MG extended release tablet Take 0.5 tablets by mouth daily . 5 tablet daily 45 tablet 0         Current Facility-Administered Medications:     famotidine (PEPCID) tablet 20 mg, 20 mg, Oral, QAM AC, Geovanna Gillis MD, 20 mg at 09/15/22 0749    insulin lispro (HUMALOG) injection vial 0-8 Units, 0-8 Units, SubCUTAneous, TID WC, Tiny Mercer MD    insulin lispro (HUMALOG) injection vial 0-4 Units, 0-4 Units, SubCUTAneous, Nightly, Tiny Mercer MD    glucose-vitamin C chewable tablet 4 tablet, 4 tablet, Oral, PRN, Tiny Mercer MD    dextrose bolus 10% 125 mL, 125 mL, IntraVENous, PRN **OR** dextrose bolus 10% 250 mL, 250 mL, IntraVENous, PRN, Tiny Mercer MD    glucagon (rDNA) injection 1 mg, 1 mg, SubCUTAneous, PRN, Tiny Mercer MD    dextrose 10 % infusion, , IntraVENous, Continuous PRN, Tiny Mercer MD    ondansetron [facility]) injection 4 mg, 4 mg, IntraVENous, Once, Tiny Mercer MD    sodium chloride flush 0.9 % injection 5-40 mL, 5-40 mL, IntraVENous, PRN, Stephen Carrasco MD    acetylcysteine (MUCOMYST) 20 % solution 600 mg, 600 mg, Oral, BID, Loreto Lau MD, 600 mg at 09/14/22 2042    ascorbic acid (VITAMIN C) tablet 500 mg, 500 mg, Oral, TID, Loreto Lau MD, 500 mg at 09/15/22 0700    ziprasidone (GEODON) injection 20 mg, 20 mg, IntraMUSCular, Once, Ghanshyam Cage PA-C    [DISCONTINUED] potassium chloride (KLOR-CON M) extended release tablet 40 mEq, 40 mEq, Oral, PRN, 40 mEq at 09/14/22 1006 **OR** [DISCONTINUED] potassium bicarb-citric acid (EFFER-K) effervescent tablet 40 mEq, 40 mEq, Oral, PRN, 40 mEq at 09/11/22 1805 **OR** potassium chloride 10 mEq/100 mL IVPB (Peripheral Line), 10 mEq, IntraVENous, PRN, Geovanna Gillis MD    hydrALAZINE (APRESOLINE) injection 10 mg, 10 mg, IntraVENous, Q6H PRN, Geovanna Gillis MD, 10 mg at 09/14/22 1711    albuterol sulfate HFA (PROVENTIL;VENTOLIN;PROAIR) 108 (90 Base) MCG/ACT inhaler 2 puff, 2 puff, Inhalation, 4x Daily PRN, Jodee Yang MD, 2 puff at 09/11/22 0141    aspirin EC tablet 81 mg, 81 mg, Oral, Daily, Jodee Yang MD, 81 mg at 09/15/22 0747    clopidogrel (PLAVIX) tablet 75 mg, 75 mg, Oral, Daily, Jodee Yang MD, 75 mg at 09/15/22 0747    fluticasone (FLONASE) 50 MCG/ACT nasal spray 2 spray, 2 spray, Each Nostril, Daily, Jodee Yang MD, 2 spray at 09/15/22 0748    levothyroxine (SYNTHROID) tablet 125 mcg, 125 mcg, Oral, Daily, Jodee Yang MD, 125 mcg at 09/13/22 0704    HYDROcodone-acetaminophen (NORCO) 5-325 MG per tablet 1 tablet, 1 tablet, Oral, 4x Daily PRN, Jodee Yang MD, 1 tablet at 09/14/22 2159    metoprolol succinate (TOPROL XL) extended release tablet 12.5 mg, 12.5 mg, Oral, Daily, Jodee Yang MD, 12.5 mg at 09/15/22 0747    sodium chloride flush 0.9 % injection 5-40 mL, 5-40 mL, IntraVENous, 2 times per day, Jodee Yang MD, 10 mL at 09/15/22 0750    sodium chloride flush 0.9 % injection 5-40 mL, 5-40 mL, IntraVENous, PRN, Jodee Yang MD, 10 mL at 09/11/22 1805    0.9 % sodium chloride infusion, , IntraVENous, PRN, Jodee Yang MD    enoxaparin Sodium (LOVENOX) injection 30 mg, 30 mg, SubCUTAneous, Daily, Jodee Yang MD, 30 mg at 09/13/22 0906    ondansetron (ZOFRAN-ODT) disintegrating tablet 4 mg, 4 mg, Oral, Q8H PRN **OR** [DISCONTINUED] ondansetron (ZOFRAN) injection 4 mg, 4 mg, IntraVENous, Q6H PRN, Jodee Yang MD    polyethylene glycol (GLYCOLAX) packet 17 g, 17 g, Oral, Daily PRN, Jodee Yang MD    acetaminophen (TYLENOL) tablet 650 mg, 650 mg, Oral, Q6H PRN **OR** acetaminophen (TYLENOL) suppository 650 mg, 650 mg, Rectal, Q6H PRN, Jodee Yang MD         REVIEW OF SYSTEMS:  As mentioned in chief complaint and HPI , Subjective          ======================================================================================= PHYSICAL EXAM:  Recent vital signs and recent I/Os reviewed by me. Wt Readings from Last 3 Encounters:   09/15/22 113 lb (51.3 kg)   09/06/22 120 lb (54.4 kg)   09/01/22 120 lb (54.4 kg)     BP Readings from Last 3 Encounters:   09/15/22 134/67   09/01/22 (!) 142/61   08/23/22 110/60     Patient Vitals for the past 24 hrs:   BP Temp Temp src Pulse Resp SpO2 Weight   09/15/22 0747 134/67 -- -- 71 -- -- --   09/15/22 0439 133/60 98 °F (36.7 °C) Oral 71 15 96 % 113 lb (51.3 kg)   09/15/22 0041 (!) 120/55 98.2 °F (36.8 °C) Oral 74 16 96 % --   09/14/22 2012 (!) 164/53 98.3 °F (36.8 °C) Oral 79 17 98 % --   09/14/22 1846 138/71 -- -- 74 16 96 % --   09/14/22 1830 138/66 -- -- 90 -- 99 % --   09/14/22 1815 (!) 159/65 -- -- 80 -- 98 % --   09/14/22 1800 (!) 165/50 -- -- 76 -- 98 % --   09/14/22 1745 (!) 165/66 -- -- 79 -- -- --   09/14/22 1700 (!) 171/75 -- -- 72 16 97 % --   09/14/22 1100 (!) 162/65 97.6 °F (36.4 °C) Oral 70 16 97 % --       Physical Exam  Vitals reviewed. Constitutional:       General: She is not in acute distress. Appearance: Normal appearance. She is not ill-appearing. HENT:      Head: Normocephalic and atraumatic. Right Ear: External ear normal.      Left Ear: External ear normal.      Nose: Nose normal.      Mouth/Throat:      Mouth: Mucous membranes are moist. Mucous membranes are not dry. Eyes:      General: No scleral icterus. Conjunctiva/sclera: Conjunctivae normal.   Neck:      Vascular: No JVD. Cardiovascular:      Rate and Rhythm: Normal rate and regular rhythm. Heart sounds: S1 normal and S2 normal.   Pulmonary:      Effort: Pulmonary effort is normal. No respiratory distress. Breath sounds: Rhonchi present. Abdominal:      General: Bowel sounds are normal. There is no distension. Musculoskeletal:         General: Swelling(trace)present. No deformity. Cervical back: Normal range of motion and neck supple. Skin:     General: Skin is dry. Coloration: Skin is not jaundiced. Neurological:      Mental Status: She is alert and oriented to person, place, and time. Mental status is at baseline. Psychiatric:         Mood and Affect: Mood normal.         Behavior: Behavior normal.          Intake/Output Summary (Last 24 hours) at 9/15/2022 1049  Last data filed at 9/15/2022 0956  Gross per 24 hour   Intake 480 ml   Output --   Net 480 ml           =======================================================================================     DATA:  Diagnostic tests reviewed by me for today's visit:   (AS NEEDED FOR MY EVALUATION AND MANAGEMENT). Recent Labs     09/13/22 0458 09/14/22  0808 09/15/22  0547   WBC 8.5 7.4 8.4   HCT 41.8 40.9 43.0    303 347     Iron Saturation:  No components found for: PERCENTFE  FERRITIN:  No results found for: FERRITIN  IRON:    Lab Results   Component Value Date/Time    IRON 54 11/09/2020 11:05 AM     TIBC:    Lab Results   Component Value Date/Time    TIBC 216 11/09/2020 11:05 AM       Recent Labs     09/13/22 0458 09/14/22  0808 09/15/22  0547    139 140   K 4.1 3.2* 4.0    108 111*   CO2 18* 22 16*   BUN 30* 27* 25*   CREATININE 1.2 1.3* 1.4*     Recent Labs     09/13/22 0458 09/14/22  0808 09/15/22  0547   CALCIUM 8.6 8.9 9.0     No results for input(s): PH, PCO2, PO2 in the last 72 hours.     Invalid input(s): Metta Copier    ABG:  No results found for: PH, PCO2, PO2, HCO3, BE, THGB, TCO2, O2SAT  VBG:    Lab Results   Component Value Date/Time    PHVEN 7.37 03/20/2017 05:26 AM    OAI0QEV 29.7 03/20/2017 05:26 AM    BEVEN -7.1 03/20/2017 05:26 AM    Y0MCMYPH 99 03/20/2017 05:26 AM       LDH:  No results found for: LDH  Uric Acid:    Lab Results   Component Value Date/Time    LABURIC 4.8 05/28/2019 12:31 PM       PT/INR:    Lab Results   Component Value Date/Time    PROTIME 13.5 01/22/2022 02:16 PM    INR 1.19 01/22/2022 02:16 PM     Warfarin PT/INR:  No components found for: PTPATWAR, PTINRWAR  PTT:    Lab Results   Component Value Date/Time    APTT 42.5 01/22/2022 02:16 PM   [APTT}  Last 3 Troponin:    Lab Results   Component Value Date/Time    TROPONINI <0.01 09/14/2022 07:11 PM    TROPONINI <0.01 09/13/2022 04:50 PM    TROPONINI <0.01 09/10/2022 09:07 PM       U/A:    Lab Results   Component Value Date/Time    COLORU ORANGE 09/11/2022 12:31 PM    PROTEINU TRACE 09/11/2022 12:31 PM    PHUR 6.5 09/11/2022 12:31 PM    WBCUA 20 09/11/2022 12:31 PM    RBCUA 715 09/11/2022 12:31 PM    YEAST neg 03/09/2021 02:00 PM    BACTERIA None Seen 09/11/2022 12:31 PM    CLARITYU Clear 09/11/2022 12:31 PM    SPECGRAV 1.010 09/11/2022 12:31 PM    LEUKOCYTESUR MODERATE 09/11/2022 12:31 PM    UROBILINOGEN 0.2 09/11/2022 12:31 PM    BILIRUBINUR Negative 09/11/2022 12:31 PM    BILIRUBINUR NEGATIVE 12/04/2011 06:40 PM    BLOODU LARGE 09/11/2022 12:31 PM    GLUCOSEU Negative 09/11/2022 12:31 PM    GLUCOSEU NEGATIVE 12/04/2011 06:40 PM     Microalbumen/Creatinine ratio:  No components found for: RUCREAT  24 Hour Urine for Protein:  No components found for: RAWUPRO, UHRS3, EYGX27GK, UTV3  24 Hour Urine for Creatinine Clearance:  No components found for: CREAT4, UHRS10, UTV10  Urine Toxicology:  No components found for: [place], IBENZO, ICOCAINE, IMARTHC, IOPIATES, IPHENCYC    HgBA1c:    Lab Results   Component Value Date/Time    LABA1C 5.5 01/23/2022 04:22 AM     RPR:  No results found for: RPR  HIV:  No results found for: HIV  REMY:  No results found for: ANATITER, REMY  RF:  No results found for: RF  DSDNA:  No components found for: DNA  AMYLASE:  No results found for: AMYLASE  LIPASE:    Lab Results   Component Value Date/Time    LIPASE 25.0 09/14/2022 07:11 PM     Fibrinogen Level:  No components found for: FIB       BELOW MENTIONED RADIOLOGY STUDY RESULTS BY ME (AS NEEDED FOR MY EVALUATION AND MANAGEMENT).      XR CHEST (2 VW)    Result Date: 9/1/2022  EXAMINATION: TWO XRAY VIEWS OF THE CHEST 9/1/2022 7:11 am COMPARISON: 05/27/2022 HISTORY: ORDERING SYSTEM PROVIDED HISTORY: palpitations. no chest pain or fever or cough. longstanding dizziness TECHNOLOGIST PROVIDED HISTORY: Reason for exam:->palpitations. no chest pain or fever or cough. longstanding dizziness FINDINGS: Left-sided bipolar cardiac pacer is stable in configuration. The heart is enlarged with mild central pulmonary venous congestion there is no consolidation, pneumothorax or effusion. 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 Cardiomegaly with mild pulmonary venous congestion.

## 2022-09-15 NOTE — PLAN OF CARE
Problem: Discharge Planning  Goal: Discharge to home or other facility with appropriate resources  Outcome: Progressing     Problem: Pain  Goal: Verbalizes/displays adequate comfort level or baseline comfort level  Outcome: Progressing     Problem: Safety - Adult  Goal: Free from fall injury  Outcome: Progressing     Problem: ABCDS Injury Assessment  Goal: Absence of physical injury  Outcome: Progressing     Problem: Skin/Tissue Integrity  Goal: Absence of new skin breakdown  Description: 1. Monitor for areas of redness and/or skin breakdown  2. Assess vascular access sites hourly  3. Every 4-6 hours minimum:  Change oxygen saturation probe site  4. Every 4-6 hours:  If on nasal continuous positive airway pressure, respiratory therapy assess nares and determine need for appliance change or resting period.   Outcome: Progressing     Problem: Neurosensory - Adult  Goal: Achieves stable or improved neurological status  Outcome: Progressing     Problem: Cardiovascular - Adult  Goal: Maintains optimal cardiac output and hemodynamic stability  Outcome: Progressing     Problem: Respiratory - Adult  Goal: Achieves optimal ventilation and oxygenation  Outcome: Progressing     Problem: Skin/Tissue Integrity - Adult  Goal: Skin integrity remains intact  Outcome: Progressing     Problem: Musculoskeletal - Adult  Goal: Return mobility to safest level of function  Outcome: Progressing     Problem: Gastrointestinal - Adult  Goal: Minimal or absence of nausea and vomiting  Outcome: Progressing     Problem: Infection - Adult  Goal: Absence of infection at discharge  Outcome: Progressing     Problem: Genitourinary - Adult  Goal: Absence of urinary retention  Outcome: Progressing     Problem: Metabolic/Fluid and Electrolytes - Adult  Goal: Electrolytes maintained within normal limits  Outcome: Progressing     Problem: Hematologic - Adult  Goal: Maintains hematologic stability  Outcome: Progressing     Problem: Nutrition Deficit:  Goal: Optimize nutritional status  Outcome: Progressing     Problem: Chronic Conditions and Co-morbidities  Goal: Patient's chronic conditions and co-morbidity symptoms are monitored and maintained or improved  Outcome: Progressing   CTA completed today. Nephrology, Cardiology is following. Urology signed off. A&Ox 4, room air. VSS. Takes very few bites of her meals, prefers to snack throughout the day today. Call light within reach. Safety measures in place.

## 2022-09-16 VITALS
TEMPERATURE: 97.6 F | HEART RATE: 73 BPM | BODY MASS INDEX: 20.46 KG/M2 | SYSTOLIC BLOOD PRESSURE: 151 MMHG | DIASTOLIC BLOOD PRESSURE: 83 MMHG | OXYGEN SATURATION: 96 % | WEIGHT: 115.5 LBS | HEIGHT: 63 IN | RESPIRATION RATE: 18 BRPM

## 2022-09-16 LAB
ANION GAP SERPL CALCULATED.3IONS-SCNC: 11 MMOL/L (ref 3–16)
BUN BLDV-MCNC: 25 MG/DL (ref 7–20)
CALCIUM SERPL-MCNC: 9 MG/DL (ref 8.3–10.6)
CHLORIDE BLD-SCNC: 111 MMOL/L (ref 99–110)
CO2: 20 MMOL/L (ref 21–32)
CREAT SERPL-MCNC: 1.3 MG/DL (ref 0.6–1.2)
GFR AFRICAN AMERICAN: 47
GFR NON-AFRICAN AMERICAN: 39
GLUCOSE BLD-MCNC: 94 MG/DL (ref 70–99)
GLUCOSE BLD-MCNC: 95 MG/DL (ref 70–99)
PERFORMED ON: NORMAL
POTASSIUM REFLEX MAGNESIUM: 4 MMOL/L (ref 3.5–5.1)
SODIUM BLD-SCNC: 142 MMOL/L (ref 136–145)

## 2022-09-16 PROCEDURE — 97530 THERAPEUTIC ACTIVITIES: CPT

## 2022-09-16 PROCEDURE — 6370000000 HC RX 637 (ALT 250 FOR IP): Performed by: NURSE PRACTITIONER

## 2022-09-16 PROCEDURE — 97110 THERAPEUTIC EXERCISES: CPT

## 2022-09-16 PROCEDURE — 6370000000 HC RX 637 (ALT 250 FOR IP): Performed by: INTERNAL MEDICINE

## 2022-09-16 PROCEDURE — 99233 SBSQ HOSP IP/OBS HIGH 50: CPT | Performed by: NURSE PRACTITIONER

## 2022-09-16 PROCEDURE — 2580000003 HC RX 258: Performed by: INTERNAL MEDICINE

## 2022-09-16 PROCEDURE — 36415 COLL VENOUS BLD VENIPUNCTURE: CPT

## 2022-09-16 PROCEDURE — 94760 N-INVAS EAR/PLS OXIMETRY 1: CPT

## 2022-09-16 PROCEDURE — 80048 BASIC METABOLIC PNL TOTAL CA: CPT

## 2022-09-16 RX ORDER — ISOSORBIDE MONONITRATE 30 MG/1
30 TABLET, EXTENDED RELEASE ORAL DAILY
Status: DISCONTINUED | OUTPATIENT
Start: 2022-09-16 | End: 2022-09-16

## 2022-09-16 RX ORDER — ROSUVASTATIN CALCIUM 5 MG/1
5 TABLET, COATED ORAL NIGHTLY
Qty: 30 TABLET | Refills: 0 | Status: SHIPPED | OUTPATIENT
Start: 2022-09-16 | End: 2022-10-24 | Stop reason: SDUPTHER

## 2022-09-16 RX ORDER — ROSUVASTATIN CALCIUM 10 MG/1
5 TABLET, COATED ORAL NIGHTLY
Status: DISCONTINUED | OUTPATIENT
Start: 2022-09-16 | End: 2022-09-16 | Stop reason: HOSPADM

## 2022-09-16 RX ADMIN — ASPIRIN 81 MG: 81 TABLET, COATED ORAL at 08:21

## 2022-09-16 RX ADMIN — Medication 10 ML: at 07:32

## 2022-09-16 RX ADMIN — ISOSORBIDE MONONITRATE 30 MG: 30 TABLET, EXTENDED RELEASE ORAL at 10:13

## 2022-09-16 RX ADMIN — FLUTICASONE PROPIONATE 2 SPRAY: 50 SPRAY, METERED NASAL at 08:22

## 2022-09-16 RX ADMIN — METOPROLOL SUCCINATE 12.5 MG: 25 TABLET, EXTENDED RELEASE ORAL at 08:21

## 2022-09-16 RX ADMIN — FAMOTIDINE 20 MG: 20 TABLET ORAL at 07:42

## 2022-09-16 RX ADMIN — LEVOTHYROXINE SODIUM 125 MCG: 0.12 TABLET ORAL at 07:42

## 2022-09-16 RX ADMIN — CLOPIDOGREL BISULFATE 75 MG: 75 TABLET ORAL at 08:21

## 2022-09-16 ASSESSMENT — PAIN SCALES - GENERAL: PAINLEVEL_OUTOF10: 0

## 2022-09-16 NOTE — DISCHARGE SUMMARY
Hospital Medicine Discharge Summary    Patient ID: Alice Crane      Patient's PCP: Marcus Moody MD    Admit Date: 9/10/2022     Discharge Date: 9/16/2022     Admitting Physician: Jhonatan Johnson MD     Discharge Physician: Randal Hernandez MD        Active Hospital Problems    Diagnosis     SCHMID (dyspnea on exertion) [R06.00]      Priority: Medium         Hospital Course: This 77-year-old female with PMHx of chronic A. Fib s/p watchman procedure, CAD, hypertension, hyperlipidemia, CKD stage II presented with worsening exertional dyspnea. 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 Underwent cardiac CTA which showed circumflex CAD abutting inferior aspect of watchman device. No definite fistulous track seen (circumflex artery and left atrial appendage. Patient was instructed to continue aspirin, Plavix and statins. Plan for repeat LHC in 30 days to reevaluate fistula/ evaluate evolution. If stagnant, plan to attempt PCI. Acute on on chronic diastolic CHF; appears compensated  CXR showed mild pulmonary vascular congestion elevated proBNP on admission: Trended down with IV Lasix  Last echo on 7/20/22 showed EF of 55 to 60%, moderate mitral & tricuspid regurg and mild pulmonary    Exertional dyspnea likely secondary to above     Hx of chronic A. fib: s/p watchmans. Continue metoprolol     Hematuria: CT abdomen and pelvis showed few subcentimeter hypodense nodule in right and left kidney suggestive of hemorrhagic or proteinaceous cyst. Urology was consulted; plan for outpatient work-up of hematuria with CT/cystoscopy cytology     Chronic left flank pain likely secondary to nonobstructing stones.   Urology following plan for outpatient work-u     Hypothyroidism: Continue Synthroid     CKD stage III: Creatinine 1.2 on admission; remained stable around 1.3  Patient was followed by nephrology during hospital stay    Physical Exam Performed:     BP (!) 151/83   Pulse 73   Temp 97.6 °F (36.4 °C) (Oral)   Resp 18   Ht 5' 3\" (1.6 m)   Wt 115 lb 8 oz (52.4 kg)   SpO2 96%   BMI 20.46 kg/m²     General appearance: No apparent distress, appears stated age and cooperative. Eyes: Sclera clear. Pupils equal.  ENT: Moist oral mucosa. Trachea midline, no adenopathy. Cardiovascular: Regular rhythm, normal S1, S2. No murmur. Respiratory: Clear to auscultation bilaterally, no wheeze or crackles. GI: Abdomen soft, no tenderness, not distended, normal bowel sounds  Musculoskeletal:  No cyanosis in digits. No BLE edema present  Neurology: CN 2-12 grossly intact. No speech or motor deficits  Psych: Not agitated, appropriate affect. Skin: Warm, dry, normal turgor    Consults:     IP CONSULT TO CARDIOLOGY  IP CONSULT TO DIETITIAN  IP CONSULT TO UROLOGY  IP CONSULT TO NEPHROLOGY  IP CONSULT TO HOME CARE NEEDS    Disposition:      Condition at Discharge: Stable     Discharge Instructions/Follow-up:         Discharge Medications:     Discharge Medication List as of 9/16/2022 12:04 PM             Details   rosuvastatin (CRESTOR) 5 MG tablet Take 1 tablet by mouth nightly, Disp-30 tablet, R-0Normal                Details   HYDROcodone-acetaminophen (NORCO) 5-325 MG per tablet Take 1 tablet by mouth 4 times daily as needed for Pain for up to 30 days. , Disp-120 tablet, R-0Normal      albuterol sulfate HFA (VENTOLIN HFA) 108 (90 Base) MCG/ACT inhaler Inhale 2 puffs into the lungs 4 times daily as needed for Wheezing, Disp-18 g, R-0Normal      vitamin D (ERGOCALCIFEROL) 1.25 MG (36081 UT) CAPS capsule TAKE 1 CAPSULE ONCE A WEEK, Disp-12 capsule, R-1Normal      aspirin EC 81 MG EC tablet Take 1 tablet by mouth in the morning., Disp-90 tablet, R-1Normal      clopidogrel (PLAVIX) 75 MG tablet Take 1 tablet by mouth in the morning., Disp-30 tablet, R-3Normal      allopurinol (ZYLOPRIM) 100 MG tablet TAKE 1 TABLET EVERY DAY, Disp-90 tablet, R-1Normal      levothyroxine (SYNTHROID) 125 MCG tablet Take 1 tablet by mouth Daily, Disp-90 tablet, R-1Normal      topiramate (TOPAMAX) 50 MG tablet TAKE 2 TABLETS TWICE DAILY, Disp-360 tablet, R-1Normal      nitroGLYCERIN (NITROLINGUAL) 0.4 MG/SPRAY 0.4 mg spray Place 1 spray under the tongue every 5 minutes as needed for Chest pain, Disp-4.9 g, R-1Normal      fluticasone (FLONASE) 50 MCG/ACT nasal spray 2 sprays by Each Nostril route daily, Disp-16 g, R-0Normal      metoprolol succinate (TOPROL XL) 25 MG extended release tablet Take 0.5 tablets by mouth daily . 5 tablet daily, Disp-45 tablet, R-0Adjust Sig           The patient was seen and examined on day of discharge and this discharge summary is in conjunction with any daily progress note from day of discharge. Time Spent on discharge is 45 minutes  in the examination, evaluation, counseling and review of medications and discharge plan. Note that greater  than 30 minutes was spent in preparing discharge papers, discussing discharge with patient, medication review, etc.     Signed:    Torey Rodrigues MD   9/16/2022      Thank you Lam Patterson MD for the opportunity to be involved in this patient's care. If you have any questions or concerns please feel free to contact me at [phone].

## 2022-09-16 NOTE — PROGRESS NOTES
[facility] Department   Phone: [phone]    Physical Therapy    [] Initial Evaluation            [x] Daily Treatment Note         [] Discharge Summary      Patient: Ras Culp   :    MRN: [id number]   Date of Service:  2022  Admitting Diagnosis: SCHMID (dyspnea on exertion)  Current Admission Summary: Ras Culp is a 80 y.o. female with past medical history of CAD, atrial fibrillation, CHF, hyperlipidemia, hypertension who presents to the ED with complaint of shortness of breath. Patient states has had increasing shortness of breath especially with exertion over the past couple of days. Patient states he is followed up with cardiology on the outpatient basis. Had stress test she states in the past month which is abnormal.  She is scheduled to have a cardiac catheterization/angiogram on Monday with Dr. Shameka Duarte. Patient states she had increasing shortness of breath, exercise intolerance and weakness so came to the ED for further evaluation and treatment. Denies any chest pain or chest tightness. Denies cough, hemoptysis, pleuritic pain, orthopnea, pedal edema or calf tenderness. Denies fever or chills. Denies rashes or lesions. Denies abdominal pain, nausea/vomiting, urinary symptoms or changes in bowel movements.   Past Medical History:  has a past medical history of Allergic rhinitis, cause unspecified, Arthritis, Atrial fibrillation ([address] Bronchopneumonia, CAD (coronary artery disease), Cerebral artery occlusion with cerebral infarction ([address] Chronic gouty arthropathy, Chronic kidney disease, Congestive heart failure, unspecified, Degeneration of cervical intervertebral disc, Essential and other specified forms of tremor, Gout, HIGH CHOLESTEROL, History of CVA (cerebrovascular accident), Hx of blood clots, Hypertension, Influenza, Leakage of Watchman left atrial appendage closure device, Mitral valve stenosis and aortic valve insufficiency, Movement disorder, Pacemaker, Peptic ulcer, unspecified site, unspecified as acute or chronic, without mention of hemorrhage, perforation, or obstruction, Thyroid disease, Unspecified disorder of kidney and ureter, and Unspecified hypothyroidism. Past Surgical History:  has a past surgical history that includes back surgery; Cholecystectomy; Cardiac surgery; Coronary artery bypass graft (1987); shoulder surgery; Cardiac catheterization (7/2012); hip surgery (Left, 03/16/2017); joint replacement; Cardiac catheterization (08/07/2018); Percutaneous Transluminal Coronary Angio (11/04/2014); pr njx aa&/strd tfrml epi lumbar/sacral 1 level (Right, 12/3/2018); Femoral-femoral Bypass Graft (N/A, 8/22/2019); femoral bypass (Left, 8/22/2019); and IR KYPHOPLASTY LUMBAR 1 VERTEBRAL BODY (5/14/2021). Discharge Recommendations: Jeff Arroyo scored a 21/24 on the AM-PAC short mobility form. Current research shows that an AM-PAC score of 18 or greater is typically associated with a discharge to the patient's home setting. Based on the patient's AM-PAC score and their current functional mobility deficits, it is recommended that the patient have 2-3 sessions per week of Physical Therapy at d/c to increase the patient's independence. At this time, this patient demonstrates the endurance and safety to discharge home with HHPT (home vs OP services) and a follow up treatment frequency of 2-3x/wk. Please see assessment section for further patient specific details.     HOME HEALTH CARE: LEVEL 3 SAFETY     - Initial home health evaluation to occur within 24-48 hours, in patient home   - Therapy evaluations in home within 24-48 hours of discharge; including DME and home safety   - Frontload therapy 5 days, then 3x a week   - Therapy to evaluate if patient has [address] needs for personal care   -  evaluation within 24-48 hours, includes evaluation of resources and insurance to determine [place], [place], LTC, and Medicaid options     If patient discharges prior to next session this note will serve as a discharge summary. Please see below for the latest assessment towards goals. DME Required For Discharge: DME to be determined pending patient progress  Precautions/Restrictions: high fall risk  Weight Bearing Restrictions: no restrictions  [] Right Upper Extremity  [] Left Upper Extremity [] Right Lower Extremity  [] Left Lower Extremity     Required Braces/Orthotics: no braces required   [] Right  [] Left  Positional Restrictions:no positional restrictions    Pre-Admission Information   Lives With: . Comment: 41 yo grandson \"Eddie\"     Type of Home: house  Home Layout: one level  Home Access:  2 step to enter without rails  on porch, usually comes through garage w/o steps  Bathroom Layout: tub only  Bathroom Equipment: grab bars in shower, grab bars around toilet, shower chair, hand held shower head  Toilet Height: elevated height  Home Equipment: rollator - 4 wheeled walker, manual wheelchair, reacher  Transfer Assistance: Independent without use of device  Ambulation Assistance:modified independent with use of 4WW  at home, use electric cart within community  ADL Assistance: independent with all ADL's, requires assistance with dressing for socks  IADL Assistance: requires assistance with laundry, requires assistance with cleaning, requires assistance with yard work, requires assistance with driving/transportation  Active :        [] Yes  [x] No  Hand Dominance: [] Left  [] Right  Current Employment: retired. Occupation: administrative work at [employer]: small dog, reading, watch TV  Recent Falls: 4-5 falls within 6 months due to lifting dog and tripping on rugs      Subjective  General: Patient sitting in recliner chair and eating breakfast upon arrival. Patient is agreeable to PT treatment. Pt c/o of R hip/back pain with trunk flexion in sitting and hip flexion in standing.  Pt reports pain with palpation around R PSIS, around R SI joint. Pt states that this pain causes discomfort with sleeping and with some functional activity. Pain: 0/10 at rest, does not rate R hip/low back pain  Pain Interventions: not applicable       Functional Mobility  Bed Mobility  Bed mobility not completed on this date. Comments:   Transfers  Sit to stand transfer: supervision  Stand to sit transfer: supervision  Comments: Patient performed sit to stand 3x from recliner chair   Ambulation  Surface:level surface  Assistive Device: rolling walker  Assistance: stand by assistance  Distance: 260 ft  Gait Mechanics: narrow MICHELE, decreased hip flex/extension, decreased step length  Comments:  Patient ambulated from chair to hallway with RW, with no standing rest breaks or feeling dizziness/SOB. Stair Mobility  Stair mobility not completed on this date. Comments:  Wheelchair Mobility:  No w/c mobility completed on this date. Comments:  Balance  Static Sitting Balance: good: independent with functional balance in unsupported position  Dynamic Sitting Balance: good: independent with functional balance in unsupported position  Static Standing Balance: fair (+): maintains balance at SBA/supervision without use of UE support  Dynamic Standing Balance: fair (-): maintains balance at SBA with use of UE support  Comments: Patient education and exercises performed with pt sitting edge of recliner chair for ~15-20 mins. Patient's doctor came in to communicate with patient for an additional ~5 minutes with patient sitting edge of recliner chair. Other Therapeutic Interventions  Standing marches x 10, pain noted with R hip flexion and with palpation over R SI joint. Pt encouraged to continue AROM of R hip in pain-free ranges  LAQ with emphasis of squeezing quad mm to improve strength with sit <> stand transfers and ambulation.  Pt instructed to complete 3 x 10 daily    Pt education on positions in bed to alleviate R hip/back pain with sleeping. Pt also educated and encouraged to participate in [address]  upon d/c, and expectations for home health care within first week. Pt agreeable and more open to HHPT initially following d/c from hospital.    Functional Outcomes  AM-PAC Inpatient Mobility Raw Score : 21              Cognition  WFL  Orientation:    alert and oriented x 4  Command Following:   [facility]    Education  Barriers To Learning: none  Patient Education: patient educated on goals, PT role and benefits, plan of care, general safety, functional mobility training, family education, transfer training, discharge recommendations  Learning Assessment:  patient verbalizes and demonstrates understanding    Assessment  Activity Tolerance: Good, pt did not require a rest break with ambulation and no c/o of dizziness. Pt does report slight SOB/fatigue after ~260 ft in the hallway  Impairments Requiring Therapeutic Intervention: decreased functional mobility, decreased ADL status, decreased strength, decreased endurance, decreased balance  Prognosis: good  Clinical Assessment: Patient continues to present just below baseline function secondary to SOB present with activity. Patient status improving as pt ambulated ~260 feet with RW this visit and did not require any rest break but did c/o slight fatigue afterwards.  Patient will continue to benefit from skilled PT in order to return to [facility] with all functional mobility prior to d/c from the hospital.   Safety Interventions: patient left in chair, chair alarm in place, call light within reach, gait belt, and nurse notified    Plan  Frequency: 3-5 x/per week  Current Treatment Recommendations: strengthening, balance training, functional mobility training, gait training, stair training, endurance training, patient/caregiver education, ADL/self-care training, home exercise program, and safety education    Goals  Patient Goals: to return home   Short Term Goals:  Time Frame: upon d/c  Patient will complete transfers

## 2022-09-16 NOTE — PROGRESS NOTES
[facility]   Cardiology Progress Note     Date: 9/16/2022  Admit Date: 9/10/2022     Reason for consultation:     Chief Complaint   Patient presents with    Shortness of Breath    Atrial Fibrillation     Sent by dr Anatoliy Ortega to have angiogram on monday       History of Present Illness: History obtained from patient and medical record. Jessica Gandara is a 80 y.o. female who  apparently also got confused in the hospital.  She has chronic atrial  fibrillation. However, she is now status post Watchman device. She has  had TIA. He has chronic renal insufficiency with current creatinine  being 1.1. She has a systolic congestive heart failure. Ejection  fraction seemed improved to 45%. She has hypertension, hyperlipidemia,  hypothyroidism. She has longstanding coronary artery disease with  bypass graft surgery in the past.  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 She has had significant  peripheral vascular disease with left to right fem-fem bypass and left  found to have above-knee popliteal bypass. She is status post pacemaker  01/25/2021. She is experiencing worsening shortness of breath. She  recently had nuclear stress testing, which showed anterior ischemia and  now it has been scheduled for outpatient cardiac catheterization,  scheduled for tomorrow. However, she grew progressively short of breath  and the daughter felt it was best to bring her to the emergency room. In the emergency room, proBNP is significantly above her baseline at  7004. Troponins normal.  Creatinine 1.1. (per consult note)    Interval Hx: Today, she is feeling better today. No chest pain. Her breathing feels better. She walked in the hallway and tolerated well symptomatically. She is excited to go home today. Pt dc'd off tele at time of visit. Unable to review today. Patient seen and examined. Clinical notes reviewed. No new complaints today. No major events overnight. Denies having chest pain, palpitations, shortness of breath, orthopnea/PND, cough, or dizziness at the time of this visit. Past Medical History:  Past Medical History:   Diagnosis Date    Allergic rhinitis, cause unspecified     Arthritis     Atrial fibrillation (HCC)     Bronchopneumonia     CAD (coronary artery disease)     stent:  post cataract surgery (CABG)    Cerebral artery occlusion with cerebral infarction [facility])     TIA    Chronic gouty arthropathy     Chronic kidney disease     Congestive heart failure, unspecified     Degeneration of cervical intervertebral disc     Essential and other specified forms of tremor     Gout     HIGH CHOLESTEROL     History of CVA (cerebrovascular accident)     Hx of blood clots     Hypertension     Influenza 12/23/2017    Leakage of Watchman left atrial appendage closure device     Mitral valve stenosis and aortic valve insufficiency     Movement disorder     back problems    Pacemaker     Peptic ulcer, unspecified site, unspecified as acute or chronic, without mention of hemorrhage, perforation, or obstruction     Thyroid disease     Unspecified disorder of kidney and ureter     Unspecified hypothyroidism         Past Surgical History:    has a past surgical history that includes back surgery; Cholecystectomy; Cardiac surgery; Coronary artery bypass graft (1987); shoulder surgery; Cardiac catheterization (7/2012); hip surgery (Left, 03/16/2017); joint replacement; Cardiac catheterization (08/07/2018); Percutaneous Transluminal Coronary Angio (11/04/2014); pr njx aa&/strd tfrml epi lumbar/sacral 1 level (Right, 12/3/2018); Femoral-femoral Bypass Graft (N/A, 8/22/2019); femoral bypass (Left, 8/22/2019); and IR KYPHOPLASTY LUMBAR 1 VERTEBRAL BODY (5/14/2021). Social History:  Reviewed. reports that she quit smoking about 35 years ago. Her smoking use included cigarettes. She has a 28.00 pack-year smoking history.  She has never used smokeless tobacco. She reports current alcohol use. She reports that she does not use drugs. Allergies: Allergies   Allergen Reactions    Aspirin Nausea Only    Diltiazem Anaphylaxis    Diltiazem Hcl Anaphylaxis    Lorazepam Other (See Comments)     hallucinations  hallucinations  hallucinations    Sulfa Antibiotics Rash and Hives    Atorvastatin Other (See Comments)     Muscle pains  Muscle pains  Muscle pains    Dabigatran Nausea Only     And indigestion  And indigestion    Aka Pradaxa  And indigestion  And indigestion  And indigestion    Aka Pradaxa  And indigestion  And indigestion  And indigestion    Aka Pradaxa    Mysoline [Primidone]     Nsaids      Other reaction(s): Unknown    Other Other (See Comments)     Nitroglycerin patches causes severe headaches    Primidone      Other reaction(s): Unknown       Family History:  Reviewed. family history includes Cancer in her maternal grandmother, paternal grandmother, and sister; Diabetes in her brother and maternal uncle; Heart Disease in her brother, maternal aunt, and sister; Hypertension in her brother and paternal aunt; Stroke in her maternal aunt. Denies family history of sudden cardiac death, arrhythmia, premature CAD    Home Meds:  Prior to Visit Medications    Medication Sig Taking? Authorizing Provider   HYDROcodone-acetaminophen (NORCO) 5-325 MG per tablet Take 1 tablet by mouth 4 times daily as needed for Pain for up to 30 days. Lam Patterson MD   torsemide (DEMADEX) 10 MG tablet 20 mg 2 days of the week and 10 mg the rest  Tory Mcgovern, APRN - CNS   albuterol sulfate HFA (VENTOLIN HFA) 108 (90 Base) MCG/ACT inhaler Inhale 2 puffs into the lungs 4 times daily as needed for Wheezing  Lam Patterson MD   vitamin D (ERGOCALCIFEROL) 1.25 MG (75359 UT) CAPS capsule TAKE 1 CAPSULE ONCE A WEEK  Lam Patterson MD   aspirin EC 81 MG EC tablet Take 1 tablet by mouth in the morning.   Checo Delgadillo MD   clopidogrel (PLAVIX) 75 MG tablet Take 1 tablet by mouth in the morning. Farzad Cedeño MD   allopurinol (ZYLOPRIM) 100 MG tablet TAKE 1 TABLET EVERY DAY  Magdi Cerna MD   levothyroxine (SYNTHROID) 125 MCG tablet Take 1 tablet by mouth Daily  Magdi Cerna MD   topiramate (TOPAMAX) 50 MG tablet TAKE 2 TABLETS TWICE DAILY  Magdi Cerna MD   nitroGLYCERIN (NITROLINGUAL) 0.4 MG/SPRAY 0.4 mg spray Place 1 spray under the tongue every 5 minutes as needed for Chest pain  Magdi Cerna MD   fluticasone (FLONASE) 50 MCG/ACT nasal spray 2 sprays by Each Nostril route daily  TIFFANIE Ramirez CNP   metoprolol succinate (TOPROL XL) 25 MG extended release tablet Take 0.5 tablets by mouth daily . 5 tablet daily  TIFFANIE Anaya CNP        Scheduled Meds:   famotidine  20 mg Oral QAM AC    insulin lispro  0-8 Units SubCUTAneous TID WC    insulin lispro  0-4 Units SubCUTAneous Nightly    ondansetron  4 mg IntraVENous Once    ziprasidone  20 mg IntraMUSCular Once    aspirin EC  81 mg Oral Daily    clopidogrel  75 mg Oral Daily    fluticasone  2 spray Each Nostril Daily    levothyroxine  125 mcg Oral Daily    metoprolol succinate  12.5 mg Oral Daily    sodium chloride flush  5-40 mL IntraVENous 2 times per day    enoxaparin  30 mg SubCUTAneous Daily     Continuous Infusions:   dextrose      sodium chloride       PRN Meds:glucose, dextrose bolus **OR** dextrose bolus, glucagon (rDNA), dextrose, sodium chloride flush, [DISCONTINUED] potassium chloride **OR** [DISCONTINUED] potassium alternative oral replacement **OR** potassium chloride, hydrALAZINE, albuterol sulfate HFA, HYDROcodone-acetaminophen, sodium chloride flush, sodium chloride, ondansetron **OR** [DISCONTINUED] ondansetron, polyethylene glycol, acetaminophen **OR** acetaminophen     Review of Systems:  Constitutional: Negative for fever, night sweats, chills,  Skin: Negative for rash, pruritus, bleeding, or bruising    HEENT: Negative for vision changes, ringing in the ears, dysphagia  Respiratory: Reviewed in HPI  Cardiovascular: Reviewed in HPI  Gastrointestinal: Negative for abdominal pain, N/V/D, constipation, or black/tarry stools  Genito-Urinary: Negative for dysuria, incontinence, or hematuria  Musculoskeletal: Negative for joint swelling, muscle pain, or injuries  Neurological/Psych: Negative for confusion, seizures, headaches, or TIA-like symptoms. No anxiety or depression. Physical Examination:  Vitals:    09/16/22 0437   BP: 124/60   Pulse: 73   Resp: 16   Temp: 97.6 °F (36.4 °C)   SpO2: 97%      In: 960 [P.O.:960]  Out: 200    Wt Readings from Last 3 Encounters:   09/16/22 115 lb 8 oz (52.4 kg)   09/06/22 120 lb (54.4 kg)   09/01/22 120 lb (54.4 kg)       Intake/Output Summary (Last 24 hours) at 9/16/2022 0815  Last data filed at 9/16/2022 0450  Gross per 24 hour   Intake 960 ml   Output 200 ml   Net 760 ml       Telemetry: dc'd off tele at time of visit, unable to review. Constitutional: Cooperative and in no apparent distress, and appears well nourished  Skin: Warm and pink; no pallor, cyanosis, clubbing, +scattered bruising. R groin stable. HEENT: Symmetric and normocephalic. PERRL. Conjunctiva pink with clear sclera. Mucus membranes moist.   Cardiovascular: Regular rate and rhythm. S1/S2 present without murmurs, no rubs or gallops. Peripheral pulses 2+, capillary refill < 3 seconds. No elevation of JVP. No peripheral edema  Respiratory: Respirations symmetric and unlabored. Lungs clear to auscultation bilaterally, no wheezing, crackles, or rhonchi  Gastrointestinal: Abdomen soft and round. Bowel sounds active without tenderness. Musculoskeletal: Bilateral upper and lower extremity strength with generalized weakness   Neurologic/Psych: Awake and orientated to person, place and time.  Calm affect, appropriate mood    Pertinent labs, diagnostic, device, and imaging results reviewed as a part of this visit    Labs:    BMP:   Recent Labs     09/14/22  0808 09/15/22  0547 09/16/22  0535   NA 139 140 142   K 3.2* 4.0 4.0    111* 111*   CO2 22 16* 20*   BUN 27* 25* 25*   CREATININE 1.3* 1.4* 1.3*     Estimated Creatinine Clearance: 28 mL/min (A) (based on SCr of 1.3 mg/dL (H)). CBC:   Recent Labs     22  0808 09/15/22  0547   WBC 7.4 8.4   HGB 12.7 13.2   HCT 40.9 43.0   MCV 85.6 87.1    347     Thyroid:   Lab Results   Component Value Date/Time    TSH 0.40 2022 02:12 AM    R7YHRBU 0.89 2022 12:15 PM    I8MVJDH 7.3 10/03/2018 02:41 PM     Lipids:   Lab Results   Component Value Date/Time    CHOL 135 2022 04:22 AM    HDL 27 2022 04:22 AM    HDL 31 2012 08:49 AM    TRIG 227 2022 04:22 AM     LFTS:   Lab Results   Component Value Date/Time    ALT 9 2022 02:12 AM    AST 19 2022 02:12 AM    ALKPHOS 110 2022 02:12 AM    PROT 6.9 2022 02:12 AM    PROT 7.1 10/19/2012 01:06 PM    AGRATIO 1.4 2022 02:12 AM    BILITOT 0.6 2022 02:12 AM    BILITOT 0.4 2019 10:46 AM     Cardiac Enzymes:   Lab Results   Component Value Date/Time    CKTOTAL 28 2017 11:04 AM    TROPONINI <0.01 2022 07:11 PM    TROPONINI <0.01 2022 04:50 PM    TROPONINI <0.01 09/10/2022 09:07 PM     Coags:   Lab Results   Component Value Date/Time    PROTIME 13.5 2022 02:16 PM    INR 1.19 2022 02:16 PM       EC22  Electronic atrial pacemaker  Left ventricular hypertrophy with repolarization abnormality  Cannot rule out Septal infarct (cited on or before 10-SEP-2022)  Abnormal ECG  When compared with ECG of 13-SEP-2022 16:43,  Electronic atrial pacemaker has replaced Sinus rhythm    DONA: 22  Summary   Normal left ventricle size, wall thickness, and systolic function with an   estimated ejection fraction of 55-60%. Moderate mitral regurgitation is present. Left atrial size is dilated. There is a Watchman device seated with a feliciano-device leak measured at 3 mm   on the anterior side. No thrombus seen.    Moderate tricuspid regurgitation. Systolic pulmonary artery pressure (SPAP) estimated at 41 mmHg (RA pressure   3 mmHg), consistent with mild pulmonary hypertension. Stress Test: 8/30/22  Summary    Small-medium sized anterolateral partial reversibility defect of moderate    intensity consistent with ischemia and infarction in the territory of the    mid and distal LAD and/or LCx . 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 L-LAD patent   LVEDP NA   LVG NA      RHC:  RA RV PA FANNIE WP TCO TCI JODI longterm RA% PA%   2 30/3 35/15 23 JULIOCESAR 4.5 3.0 4.7 3.1 70 67%      Intervention(s)  None     Post Cath Dx:       CAD as above  Consider PCI of Cx in future if Cx proper geographically  from watchman device on CTC    Cardiac CTA: 9/15/22  FINDINGS:   Left atrium:       Watch man device is seen. There is only a small amount of thrombus in the   watch man device. .       The left atrial appendage is not thrombosed. Contrast is seen in the left   atrial appendage, compatible with leakage. .       On axial images 18. There is a crescentic area of contrast flowing around   the watch man device superiorly in the left atrial appendage. Wendie Red However, no   definite fistulous tract is seen connecting this to the left atrium. Circumflex coronary artery abuts the inferior aspect of the watch man device. There is severe calcified and noncalcified plaque seen in the circumflex. A   definite fistulous tract between the circumflex and the left atrial appendage   is not clearly identified by CT. Wendie Red        Native right Pacemaker lead failure     Memory loss due to medical condition 09/09/2020    Bilateral sensorineural hearing loss 07/15/2020    Iron deficiency anemia 04/30/2020    Anemia 04/30/2020    Peripheral vertigo, bilateral     PAF (paroxysmal atrial fibrillation) ([address]     Dyslipidemia     Dizziness 02/06/2020    Ischemic cardiomyopathy 12/04/2019    Idiopathic peripheral neuropathy 11/22/2019    Atherosclerosis of native arteries of extremities with intermittent claudication, bilateral legs (HCC)     Stage 3 chronic kidney disease ([address] 07/22/2019    PAD (peripheral artery disease) ([address] 07/22/2019    Chronic systolic heart failure ([address] 12/28/2018    Age related osteoporosis 05/01/2018    Chronic total occlusion of native coronary artery 04/12/2018    Tremor 11/08/2017    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/10/2017    Vitamin D deficiency 08/10/2017    PAT (paroxysmal atrial tachycardia) ([address] 08/08/2017    Pacemaker 04/24/2017    Cerebrovascular accident (CVA) ([address]     HTN (hypertension), benign     Fibrocystic breast 03/03/2017    Allergic rhinitis 05/26/2015    Sick sinus syndrome ([address] 11/19/2013    COPD (chronic obstructive pulmonary disease) ([address] 02/14/2013    Restrictive lung disease 02/14/2013    Non-rheumatic mitral regurgitation 07/20/2012    Chronic gouty arthropathy     Acquired hypothyroidism     Mixed hyperlipidemia 05/16/2011        Assessment and Plan:     Acute diastolic CHF   ~ diuresed with lasix. Now appears compensated. Chronic atrial fibrillation   ~ s/p Watchman device 5/2021. CTA reviewed with EP. 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    No definite fistulous tract seen between the circumflex coronary artery and   the left atrial appendage   ~ plan for repeat LHC in 30 days to reevaluate fistula/ evaluate evolution. If stagnant, will attempt PCI. ~ continue aspirin and Plavix. Start statin. CKD  ~ per nephology     UTI  ~ on abx per primary     PPM 1/2021  Hx of TIA  HTN  HLD  PAD s/p bypass     Pt stable from a cardiac standpoint. Follow up in office 9/21 as scheduled. Multiple medical conditions with risk of decompensation. All pertinent information and plan of care discussed with the physician. All questions and concerns were addressed to the patient. Alternatives to my treatment were discussed. I have discussed the above stated plan with patient and the nurse. The patient verbalized understanding and agreed with the plan. Thank you for allowing to us to participate in the care of Jessica Gandara. Total visit time > 35 minutes; > 50% spend counseling / coordinating care. I reviewed interval history, physical exam, review of data including labs, imaging, development and implementation of treatment plan and coordination of complex care. Counseled on risk factor modifications. Jas [facility]  [address]   Office: [phone]    NOTE:  This report was transcribed using voice recognition software. Every effort was made to ensure accuracy; however, inadvertent computerized transcription errors may be present.

## 2022-09-16 NOTE — CARE COORDINATION
Discharge Plan:   Patient discharged  on 9/16/22 to home with family . Declined skilled c servics. All discharge needs met per case management .

## 2022-09-16 NOTE — PROGRESS NOTES
A&O x4. Up as tolerated with SBA. Tele and three IV's removed without difficulty. Patient has scattered bruising on BUE r/t labs, IV, angiogram ect. . patient states that most were present on admission. Patient will follow up with cardiology and urology as out  patient. Nephrology is okay with discharge from hospital today.

## 2022-09-16 NOTE — DISCHARGE INSTR - COC
Continuity of Care Form    Patient Name: Nacho Harley   :    MRN:  [id number]    Admit date:  9/10/2022  Discharge date:  ***    Code Status Order: Full Code   Advance Directives:     Admitting Physician:  Terrell Alexander MD  PCP: Kristine Hodges MD    Discharging Nurse: [facility] Unit/Room#: [id number]-3569/7016-05  Discharging Unit Phone Number: ***    Emergency Contact:   Extended Emergency Contact Information  Primary Emergency Contact: Graciela Abrams  Address:  [facility] [place] [address] Phone: [phone]  Mobile Phone: [phone]  Relation: Child  Secondary Emergency Contact: [address], [address] [facility] Phone: [phone]  Relation: Child    Past Surgical History:  Past Surgical History:   Procedure Laterality Date    BACK SURGERY      CARDIAC CATHETERIZATION  2012    CARDIAC CATHETERIZATION  2018    Unsuccesful  of RCA    CARDIAC SURGERY      CABG & Cardiac ablation    CHOLECYSTECTOMY      CORONARY ARTERY BYPASS GRAFT      LIMA- Diag/LAD, SVG- RCA    FEMORAL BYPASS Left 2019    LEFT FEMORAL TO POPLITEAL BYPASS GRAFT performed by Cezar Mondragon MD at [facility]. [place] [address] N/A 2019    FEMORAL TO FEMORAL BYPASS performed by Cezar Mondragon MD at [address] Left 2017    Left hip pinning    IR KYPHOPLASTY LUMBAR FIRST LEVEL  2021    IR KYPHOPLASTY LUMBAR FIRST LEVEL 2021 [facility] SPECIAL PROCEDURES    JOINT REPLACEMENT      DE NJX AA&/STRD TFRML EPI LUMBAR/SACRAL 1 LEVEL Right 12/3/2018    RIGHT L3 AND L4 LUMBAR TRANSFORAMINAL EPIRUAL STEROID INJECTION WITH FLUOROSCOPY performed by Jeronimo Rodriguez MD at [address] [facility]    PTCA  2014    MARLENY - 3.0 x 28 to the Ist Diag    SHOULDER SURGERY      left       Immunization History:   Immunization History   Administered Date(s) Administered    COVID-19, MODERNA BLUE border, Primary or Immunocompromised, (age 12y+), IM, [place]  1560 mcg/0.5mL 01/28/2021, 02/25/2021, 11/16/2021    Influenza 09/19/2012, 10/02/2013    Influenza A ([id number]) Vaccine IM 12/21/2009    Influenza Virus Vaccine 10/03/2005, 10/12/2007, 09/28/2012, 10/04/2013, 10/20/2014, 10/05/2015    Influenza Whole 09/14/2011    Influenza, FLUAD, (age 72 y+), Adjuvanted, 0.5mL 11/02/2021    Influenza, FLUBLOK, (age 25 y+), PF, 0.5mL 10/12/2020    Influenza, High Dose (Fluzone 65 yrs and older) 10/10/2014, 09/30/2015, 11/02/2016, 10/24/2017, 09/17/2018    Influenza, Triv, inactivated, subunit, adjuvanted, IM (Fluad 65 yrs and older) 09/30/2019    Pneumococcal Conjugate 13-valent (Aatiaub02) 12/04/2015    Pneumococcal Conjugate 7-valent (Prevnar7) 12/03/2009    Pneumococcal Polysaccharide (Afrnpdmki58) 09/28/2000, 01/28/2008    Tdap (Boostrix, Adacel) 08/17/2011, 05/12/2020, 12/08/2021       Active Problems:  Patient Active Problem List   Diagnosis Code    Mixed hyperlipidemia E78.2    Chronic gouty arthropathy M1A. 00X0    Acquired hypothyroidism E03.9    Non-rheumatic mitral regurgitation I34.0    COPD (chronic obstructive pulmonary disease) ([facility]) J44.9    Restrictive lung disease J98.4    Sick sinus syndrome ([facility]) I49.5    Allergic rhinitis J30.9    Fibrocystic breast N60.19    Cerebrovascular accident (CVA) ([facility] I63.9    HTN (hypertension), benign I10    Pacemaker Z95.0    PAT (paroxysmal atrial tachycardia) ([facility]) I47.1    Primary osteoarthritis involving multiple joints M89.49    Vitamin D deficiency E55.9    Tremor R25.1    Chronic total occlusion of native coronary artery I25.10, I25.82    Age related osteoporosis M81.0    Chronic systolic heart failure ([facility]) I50.22    Stage 3 chronic kidney disease ([facility]) N18.30    PAD (peripheral artery disease) ([facility]) I73.9    Atherosclerosis of native arteries of extremities with intermittent claudication, bilateral legs (HCC) I70.213    Idiopathic peripheral neuropathy G60.9    Ischemic cardiomyopathy I25.5    Dizziness R42    Peripheral Stable    Rehab Potential (if transferring to Rehab): Good    Recommended Labs or Other Treatments After Discharge:     Physician Certification: I certify the above information and transfer of Tati Hylton  is necessary for the continuing treatment of the diagnosis listed and that she requires Home Care for less 30 days.      Update Admission H&P: No change in H&P    PHYSICIAN SIGNATURE:  Electronically signed by Daina Ludwig MD on 9/16/22 at 11:58 AM EDT

## 2022-09-16 NOTE — PROGRESS NOTES
MD Vin Morales MD Brown Peels, MD               Office: [phone]                      Fax: [phone]             [address] [place] [address]                   NEPHROLOGY INPATIENT PROGRESS NOTE:     PATIENT NAME: Bravo Chapa  : 1940  MRN: [id number]      RECOMMENDATIONS:   -no signs of CI-GIANA after [address] [facility] [address]  - CTA done yesterday with stable creat today  - K repletion done  - slow HCO3 infusion for 12 hours. HCO3 better. VBG showing AGMA+NAGMA    -Given IV Lasix with good response  -Was on torsemide 10/20-alternative days - hold for now. No need for diuretics on D/C.  -Was on Aldactone, hold for now    - on CT -  A few subcentimeter hyperdense nodules are seen in  the right and left kidney, incompletely characterized on noncontrast study,  most likely hemorrhagic or proteinaceous cyst.  Simple appearing cysts are  seen in the right and left kidney    ->> Urology is already following - for gross hematuria, plans for CT/cysto/cyto = as outpt     -urine culture neg    Discussed with Medicine    D/C plan from renal stand point: stable   -Follows with Dr. Adela Hawthorne, next appointment on 2022. Follow HCO3  - okay for D/C today    IMPRESSION:       Admitted for:  SCHMID (dyspnea on exertion) [R06.00]  Exertional dyspnea [R06.00]      Non-proteinuric CKD: 3B - crea stable at 1.3 today  - BL Scr-lowest recently about 1.2-1.3, same on admission-   -Thought to be due to hypertension, cardiorenal physiology  -Follows with me in the office      Associated problems:   Acute on chronic-systolic heart failure-  CXR - Cardiomegaly with mild pulmonary venous congestion.    Weight in past around 116-119 pounds, currently 113lbs      S/P [facility] 22 Dr Steff Gomez   IV contrast 61 cc  No LVEDP check       - Volume status: euvolemic  : BP: no need for tight control   : Na: controlled    - Azotemia: Prerenal  - Electrolytes: K: Hypokalemia  - Acid-Base: Acidosis, non-anion gap, metabolic  - Anemia: none       Other major problems: Management per primary and other consulting teams. Paroxysmal A. Fib          Hospital Problems             Last Modified POA    * (Principal) SCHMID (dyspnea on exertion) 9/12/2022 Yes           Abby Ryan MD,  [facility] [address] [place] [address] [place]: [phone] or Via [email or web address]  Fax: [phone]    =======================================================================================   =======================================================================================  Subjective / interval history:   No complaints. No diarrhea    Past medical, Surgical, Social, Family medical history reviewed by me. MEDICATIONS: reviewed by me. Medications Prior to Admission:  No current facility-administered medications on file prior to encounter. Current Outpatient Medications on File Prior to Encounter   Medication Sig Dispense Refill    HYDROcodone-acetaminophen (NORCO) 5-325 MG per tablet Take 1 tablet by mouth 4 times daily as needed for Pain for up to 30 days. 120 tablet 0    albuterol sulfate HFA (VENTOLIN HFA) 108 (90 Base) MCG/ACT inhaler Inhale 2 puffs into the lungs 4 times daily as needed for Wheezing 18 g 0    [DISCONTINUED] torsemide (DEMADEX) 10 MG tablet 20 mg 2 days of the week and 10 mg the rest 135 tablet 1    vitamin D (ERGOCALCIFEROL) 1.25 MG (60375 UT) CAPS capsule TAKE 1 CAPSULE ONCE A WEEK 12 capsule 1    aspirin EC 81 MG EC tablet Take 1 tablet by mouth in the morning. 90 tablet 1    clopidogrel (PLAVIX) 75 MG tablet Take 1 tablet by mouth in the morning.  30 tablet 3    allopurinol (ZYLOPRIM) 100 MG tablet TAKE 1 TABLET EVERY DAY 90 tablet 1    levothyroxine (SYNTHROID) 125 MCG tablet Take 1 tablet by mouth Daily 90 tablet 1    topiramate (TOPAMAX) 50 MG tablet TAKE 2 TABLETS TWICE DAILY 360 tablet 1    nitroGLYCERIN (NITROLINGUAL) 0.4 MG/SPRAY 0.4 mg spray Place 1 spray under the tongue every 5 minutes as needed for Chest pain 4.9 g 1    fluticasone (FLONASE) 50 MCG/ACT nasal spray 2 sprays by Each Nostril route daily 16 g 0    metoprolol succinate (TOPROL XL) 25 MG extended release tablet Take 0.5 tablets by mouth daily . 5 tablet daily 45 tablet 0         Current Facility-Administered Medications:     rosuvastatin (CRESTOR) tablet 5 mg, 5 mg, Oral, Nightly, Yissel Bad, APRN - CNP    famotidine (PEPCID) tablet 20 mg, 20 mg, Oral, QAM AC, Wicho Silva MD, 20 mg at 09/16/22 0742    insulin lispro (HUMALOG) injection vial 0-8 Units, 0-8 Units, SubCUTAneous, TID WC, Edie Rao MD    insulin lispro (HUMALOG) injection vial 0-4 Units, 0-4 Units, SubCUTAneous, Nightly, Edie Rao MD    glucose-vitamin C chewable tablet 4 tablet, 4 tablet, Oral, PRN, Edie Rao MD    dextrose bolus 10% 125 mL, 125 mL, IntraVENous, PRN **OR** dextrose bolus 10% 250 mL, 250 mL, IntraVENous, PRN, Edie Rao MD    glucagon (rDNA) injection 1 mg, 1 mg, SubCUTAneous, PRN, Edie Rao MD    dextrose 10 % infusion, , IntraVENous, Continuous PRN, Edie Rao MD    [facility]etron [facility]) injection 4 mg, 4 mg, IntraVENous, Once, Edie Rao MD    sodium chloride flush 0.9 % injection 5-40 mL, 5-40 mL, IntraVENous, PRN, Mandeep Fernandes MD    ziprasidone (GEODON) injection 20 mg, 20 mg, IntraMUSCular, Once, Brian Freeman PA-C    [DISCONTINUED] potassium chloride (KLOR-CON M) extended release tablet 40 mEq, 40 mEq, Oral, PRN, 40 mEq at 09/14/22 1006 **OR** [DISCONTINUED] potassium bicarb-citric acid (EFFER-K) effervescent tablet 40 mEq, 40 mEq, Oral, PRN, 40 mEq at 09/11/22 1805 **OR** potassium chloride 10 mEq/100 mL IVPB (Peripheral Line), 10 mEq, IntraVENous, PRN, Wicho Silva MD    hydrALAZINE (APRESOLINE) injection 10 mg, 10 mg, IntraVENous, Q6H PRN, Wicho Silva MD, 10 mg at 09/14/22 2061    albuterol sulfate HFA (PROVENTIL;VENTOLIN;PROAIR) 108 (90 Base) MCG/ACT inhaler 2 puff, 2 puff, Inhalation, 4x Daily PRN, Jhonatan Johnson MD, 2 puff at 09/11/22 0141    aspirin EC tablet 81 mg, 81 mg, Oral, Daily, Jhonatan Johnson MD, 81 mg at 09/16/22 3958    clopidogrel (PLAVIX) tablet 75 mg, 75 mg, Oral, Daily, Jhonatan Johnson MD, 75 mg at 09/16/22 0821    fluticasone (FLONASE) 50 MCG/ACT nasal spray 2 spray, 2 spray, Each Nostril, Daily, Jhonatan Johnson MD, 2 spray at 09/16/22 6303    levothyroxine (SYNTHROID) tablet 125 mcg, 125 mcg, Oral, Daily, Jhonatan Johnson MD, 125 mcg at 09/16/22 0742    HYDROcodone-acetaminophen (NORCO) 5-325 MG per tablet 1 tablet, 1 tablet, Oral, 4x Daily PRN, Jhonatan Johnson MD, 1 tablet at 09/15/22 2210    metoprolol succinate (TOPROL XL) extended release tablet 12.5 mg, 12.5 mg, Oral, Daily, Jhonatan Johnson MD, 12.5 mg at 09/16/22 2855    sodium chloride flush 0.9 % injection 5-40 mL, 5-40 mL, IntraVENous, 2 times per day, Jhonatan Johnson MD, 10 mL at 09/16/22 0732    sodium chloride flush 0.9 % injection 5-40 mL, 5-40 mL, IntraVENous, PRN, Jhonatan Johnson MD, 10 mL at 09/11/22 1805    0.9 % sodium chloride infusion, , IntraVENous, PRN, Jhonatan Johnson MD    enoxaparin Sodium (LOVENOX) injection 30 mg, 30 mg, SubCUTAneous, Daily, Jhonatan Johnson MD, 30 mg at 09/13/22 0906    ondansetron (ZOFRAN-ODT) disintegrating tablet 4 mg, 4 mg, Oral, Q8H PRN **OR** [DISCONTINUED] ondansetron (ZOFRAN) injection 4 mg, 4 mg, IntraVENous, Q6H PRN, Jhonatan Johnson MD    polyethylene glycol (GLYCOLAX) packet 17 g, 17 g, Oral, Daily PRN, Jhonatan Johnson MD    acetaminophen (TYLENOL) tablet 650 mg, 650 mg, Oral, Q6H PRN **OR** acetaminophen (TYLENOL) suppository 650 mg, 650 mg, Rectal, Q6H PRN, Jhonatan Johnson MD    Current Outpatient Medications:     rosuvastatin (CRESTOR) 5 MG tablet, Take 1 tablet by mouth nightly, Disp: 30 tablet, Rfl: 0    HYDROcodone-acetaminophen (NORCO) 5-325 MG per tablet, Take 1 tablet by mouth 4 times daily as needed for Pain for up to 30 days. , Disp: 120 tablet, Rfl: 0    albuterol sulfate HFA (VENTOLIN HFA) 108 (90 Base) MCG/ACT inhaler, Inhale 2 puffs into the lungs 4 times daily as needed for Wheezing, Disp: 18 g, Rfl: 0    vitamin D (ERGOCALCIFEROL) 1.25 MG (02814 UT) CAPS capsule, TAKE 1 CAPSULE ONCE A WEEK, Disp: 12 capsule, Rfl: 1    aspirin EC 81 MG EC tablet, Take 1 tablet by mouth in the morning., Disp: 90 tablet, Rfl: 1    clopidogrel (PLAVIX) 75 MG tablet, Take 1 tablet by mouth in the morning., Disp: 30 tablet, Rfl: 3    allopurinol (ZYLOPRIM) 100 MG tablet, TAKE 1 TABLET EVERY DAY, Disp: 90 tablet, Rfl: 1    levothyroxine (SYNTHROID) 125 MCG tablet, Take 1 tablet by mouth Daily, Disp: 90 tablet, Rfl: 1    topiramate (TOPAMAX) 50 MG tablet, TAKE 2 TABLETS TWICE DAILY, Disp: 360 tablet, Rfl: 1    nitroGLYCERIN (NITROLINGUAL) 0.4 MG/SPRAY 0.4 mg spray, Place 1 spray under the tongue every 5 minutes as needed for Chest pain, Disp: 4.9 g, Rfl: 1    fluticasone (FLONASE) 50 MCG/ACT nasal spray, 2 sprays by Each Nostril route daily, Disp: 16 g, Rfl: 0    metoprolol succinate (TOPROL XL) 25 MG extended release tablet, Take 0.5 tablets by mouth daily . 5 tablet daily, Disp: 45 tablet, Rfl: 0         REVIEW OF SYSTEMS:  As mentioned in chief complaint and HPI , Subjective          =======================================================================================     PHYSICAL EXAM:  Recent vital signs and recent I/Os reviewed by me.      Wt Readings from Last 3 Encounters:   09/16/22 115 lb 8 oz (52.4 kg)   09/06/22 120 lb (54.4 kg)   09/01/22 120 lb (54.4 kg)     BP Readings from Last 3 Encounters:   09/16/22 (!) 151/83   09/01/22 (!) 142/61   08/23/22 110/60     Patient Vitals for the past 24 hrs:   BP Temp Temp src Pulse Resp SpO2 Weight   09/16/22 1138 -- -- -- 73 18 96 % --   09/16/22 1000 (!) 151/83 -- -- 70 -- -- --   09/16/22 0730 -- -- -- -- -- -- 115 lb 8 oz (52.4 kg)   09/16/22 0437 124/60 97.6 °F (36.4 °C) Oral 73 16 97 % --   09/15/22 2315 (!) 119/54 97.7 °F (36.5 °C) Oral 71 16 96 % --   09/15/22 1951 (!) 147/68 97.9 °F (36.6 °C) Oral 73 17 98 % --   09/15/22 1530 123/73 98.1 °F (36.7 °C) Oral 95 16 99 % --       Physical Exam  Vitals reviewed. Constitutional:       General: She is not in acute distress. Appearance: Normal appearance. She is not ill-appearing. HENT:      Head: Normocephalic and atraumatic. Right Ear: External ear normal.      Left Ear: External ear normal.      Nose: Nose normal.      Mouth/Throat:      Mouth: Mucous membranes are moist. Mucous membranes are not dry. Eyes:      General: No scleral icterus. Conjunctiva/sclera: Conjunctivae normal.   Neck:      Vascular: No JVD. Cardiovascular:      Rate and Rhythm: Normal rate and regular rhythm. Heart sounds: S1 normal and S2 normal.   Pulmonary:      Effort: Pulmonary effort is normal. No respiratory distress. Breath sounds: Rhonchi present. Abdominal:      General: Bowel sounds are normal. There is no distension. Musculoskeletal:         General: Swelling(trace)present. No deformity. Cervical back: Normal range of motion and neck supple. Skin:     General: Skin is dry. Coloration: Skin is not jaundiced. Neurological:      Mental Status: She is alert and oriented to person, place, and time. Mental status is at baseline. Psychiatric:         Mood and Affect: Mood normal.         Behavior: Behavior normal.          Intake/Output Summary (Last 24 hours) at 9/16/2022 1300  Last data filed at 9/16/2022 0450  Gross per 24 hour   Intake 720 ml   Output 200 ml   Net 520 ml           =======================================================================================     DATA:  Diagnostic tests reviewed by me for today's visit:   (AS NEEDED FOR MY EVALUATION AND MANAGEMENT).        Recent Labs     09/14/22  0808 09/15/22  0547   WBC 7.4 8.4   HCT 40.9 43.0    347     Iron Saturation:  No components found for: PERCENTFE  FERRITIN:  No results found for: FERRITIN  IRON:    Lab Results   Component Value Date/Time    IRON 54 11/09/2020 11:05 AM     TIBC:    Lab Results   Component Value Date/Time    TIBC 216 11/09/2020 11:05 AM       Recent Labs     09/14/22  0808 09/15/22  0547 09/16/22  0535    140 142   K 3.2* 4.0 4.0    111* 111*   CO2 22 16* 20*   BUN 27* 25* 25*   CREATININE 1.3* 1.4* 1.3*     Recent Labs     09/14/22  0808 09/15/22  0547 09/16/22  0535   CALCIUM 8.9 9.0 9.0     No results for input(s): PH, PCO2, PO2 in the last 72 hours.     Invalid input(s): Clearance Genet    ABG:  No results found for: PH, PCO2, PO2, HCO3, BE, THGB, TCO2, O2SAT  VBG:    Lab Results   Component Value Date/Time    PHVEN 7.303 09/15/2022 11:04 AM    [id number] 38.8 09/15/2022 11:04 AM    BEVEN -6.7 09/15/2022 11:04 AM    [id number] 97 09/15/2022 11:04 AM       LDH:  No results found for: LDH  Uric Acid:    Lab Results   Component Value Date/Time    LABURIC 4.8 05/28/2019 12:31 PM       PT/INR:    Lab Results   Component Value Date/Time    PROTIME 13.5 01/22/2022 02:16 PM    INR 1.19 01/22/2022 02:16 PM     Warfarin PT/INR:  No components found for: PTPATWAR, PTINRWAR  PTT:    Lab Results   Component Value Date/Time    APTT 42.5 01/22/2022 02:16 PM   [APTT}  Last 3 Troponin:    Lab Results   Component Value Date/Time    TROPONINI <0.01 09/14/2022 07:11 PM    TROPONINI <0.01 09/13/2022 04:50 PM    TROPONINI <0.01 09/10/2022 09:07 PM       U/A:    Lab Results   Component Value Date/Time    COLORU ORANGE 09/11/2022 12:31 PM    PROTEINU TRACE 09/11/2022 12:31 PM    PHUR 6.5 09/11/2022 12:31 PM    WBCUA 20 09/11/2022 12:31 PM    RBCUA 715 09/11/2022 12:31 PM    YEAST neg 03/09/2021 02:00 PM    BACTERIA None Seen 09/11/2022 12:31 PM    CLARITYU Clear 09/11/2022 12:31 PM    SPECGRAV 1.010 09/11/2022 12:31 PM    LEUKOCYTESUR MODERATE 09/11/2022 12:31 PM    UROBILINOGEN 0.2 09/11/2022 12:31 PM    BILIRUBINUR Negative 09/11/2022 12:31 PM    BILIRUBINUR NEGATIVE 12/04/2011 06:40 PM    BLOODU LARGE 09/11/2022 12:31 PM    GLUCOSEU Negative 09/11/2022 12:31 PM    GLUCOSEU NEGATIVE 12/04/2011 06:40 PM     Microalbumen/Creatinine ratio:  No components found for: RUCREAT  24 Hour Urine for Protein:  No components found for: RAWUPRO, UHRS3, SWSZ74AY, UTV3  24 Hour Urine for Creatinine Clearance:  No components found for: CREAT4, UHRS10, UTV10  Urine Toxicology:  No components found for: IAMMENTA, IBARBIT, IBENZO, ICOCAINE, IMARTHC, IOPIATES, IPHENCYC    HgBA1c:    Lab Results   Component Value Date/Time    LABA1C 5.5 01/23/2022 04:22 AM     RPR:  No results found for: RPR  HIV:  No results found for: HIV  REMY:  No results found for: ANATITER, REMY  RF:  No results found for: RF  DSDNA:  No components found for: DNA  AMYLASE:  No results found for: AMYLASE  LIPASE:    Lab Results   Component Value Date/Time    LIPASE 25.0 09/14/2022 07:11 PM     Fibrinogen Level:  No components found for: FIB       BELOW MENTIONED RADIOLOGY STUDY RESULTS BY ME (AS NEEDED FOR MY EVALUATION AND MANAGEMENT). XR CHEST (2 VW)    Result Date: 9/1/2022  EXAMINATION: TWO XRAY VIEWS OF THE CHEST 9/1/2022 7:11 am COMPARISON: 05/27/2022 HISTORY: ORDERING SYSTEM PROVIDED HISTORY: palpitations. no chest pain or fever or cough. longstanding dizziness TECHNOLOGIST PROVIDED HISTORY: Reason for exam:->palpitations. no chest pain or fever or cough. longstanding dizziness FINDINGS: Left-sided bipolar cardiac pacer is stable in configuration. The heart is enlarged with mild central pulmonary venous congestion there is no consolidation, pneumothorax or effusion. 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 Cardiomegaly with mild pulmonary venous congestion.

## 2022-09-16 NOTE — CARE COORDINATION
Discharge Planning; Discharge order noted with recommendations for home with home care. CM met with patient and daughter. Introduced self and explained role of CM  and discharge planning. Patient declined the home health care , stating  she does not need any home care services. She was advised incase she feels like she needs the [facility] services after leaving the hospital, she should contact her PCP's  office to set up. She expressed understanding and was in agreement.

## 2022-09-19 ENCOUNTER — CLINICAL DOCUMENTATION ONLY (OUTPATIENT)
Facility: CLINIC | Age: 82
End: 2022-09-19

## 2022-09-19 ENCOUNTER — TELEPHONE (OUTPATIENT)
Dept: OTHER | Age: 82
End: 2022-09-19

## 2022-09-19 NOTE — TELEPHONE ENCOUNTER
[facility] FAILURE PROGRAM  TELEPHONE ENCOUNTER FORM    K[place]em Nation 1940    ASSESSMENT:   Baseline weight: 115 lbs  Current weight: 117 lbs  Patient weighing daily: Yes  What are your symptoms of heart failure: dyspnea  Are you having any symptoms:  No  Patient knows who to call with symptoms: Yes  Diet history:      Patient states sodium limitation is : 3000 mg a day      Patient states fluid limitation is 64 ounces a day   Patient following diet as instructed: Yes  Medication history: taking medications as instructed Yes; medication side effects noted No  Patient is being active at home: Yes  Patient knows date and time of follow up appointment: Yes   Patient has transportation to appointments: Yes    RECOMMENDATIONS:   Medication: Taking medications as instructed   Diet: Low sodium diet  MD/ Clinic appointment: 9/21 with Simin Mauricio NP  Other: Heart Failure Resource line provided.

## 2022-09-20 DIAGNOSIS — I63.9 CEREBROVASCULAR ACCIDENT (CVA), UNSPECIFIED MECHANISM (HCC): Primary | ICD-10-CM

## 2022-09-20 DIAGNOSIS — R42 VERTIGO: ICD-10-CM

## 2022-09-21 ENCOUNTER — OFFICE VISIT (OUTPATIENT)
Dept: CARDIOLOGY CLINIC | Age: 82
End: 2022-09-21
Payer: MEDICARE

## 2022-09-21 VITALS
OXYGEN SATURATION: 97 % | HEART RATE: 70 BPM | BODY MASS INDEX: 21.26 KG/M2 | DIASTOLIC BLOOD PRESSURE: 60 MMHG | WEIGHT: 120 LBS | HEIGHT: 63 IN | SYSTOLIC BLOOD PRESSURE: 160 MMHG

## 2022-09-21 DIAGNOSIS — I50.22 CHRONIC SYSTOLIC HEART FAILURE (HCC): Primary | ICD-10-CM

## 2022-09-21 DIAGNOSIS — N28.9 RENAL INSUFFICIENCY: ICD-10-CM

## 2022-09-21 DIAGNOSIS — I48.20 CHRONIC ATRIAL FIBRILLATION (HCC): ICD-10-CM

## 2022-09-21 DIAGNOSIS — I25.810 CORONARY ARTERY DISEASE INVOLVING AUTOLOGOUS ARTERY CORONARY BYPASS GRAFT WITHOUT ANGINA PECTORIS: ICD-10-CM

## 2022-09-21 PROCEDURE — 99214 OFFICE O/P EST MOD 30 MIN: CPT | Performed by: CLINICAL NURSE SPECIALIST

## 2022-09-21 PROCEDURE — 1123F ACP DISCUSS/DSCN MKR DOCD: CPT | Performed by: CLINICAL NURSE SPECIALIST

## 2022-09-21 PROCEDURE — 1036F TOBACCO NON-USER: CPT | Performed by: CLINICAL NURSE SPECIALIST

## 2022-09-21 PROCEDURE — G8427 DOCREV CUR MEDS BY ELIG CLIN: HCPCS | Performed by: CLINICAL NURSE SPECIALIST

## 2022-09-21 PROCEDURE — 1090F PRES/ABSN URINE INCON ASSESS: CPT | Performed by: CLINICAL NURSE SPECIALIST

## 2022-09-21 PROCEDURE — 1111F DSCHRG MED/CURRENT MED MERGE: CPT | Performed by: CLINICAL NURSE SPECIALIST

## 2022-09-21 PROCEDURE — G8420 CALC BMI NORM PARAMETERS: HCPCS | Performed by: CLINICAL NURSE SPECIALIST

## 2022-09-21 PROCEDURE — G8399 PT W/DXA RESULTS DOCUMENT: HCPCS | Performed by: CLINICAL NURSE SPECIALIST

## 2022-09-21 RX ORDER — TORSEMIDE 20 MG/1
TABLET ORAL
Qty: 30 TABLET | Refills: 3
Start: 2022-09-21

## 2022-09-21 NOTE — PATIENT INSTRUCTIONS
Start torsemide 10 mg on mon wed and fri  Weight self daily and if weight <114 dont take the torsemide  That means, weight yourself daily!!  Call me if BP does not improve  Will schedule an appt with Dr Moe Kruger oct 25th appt

## 2022-09-21 NOTE — PROGRESS NOTES
[facility], [place]  Progress Note    Primary Care Doctor:  William Vargas MD    Chief Complaint   Patient presents with    Follow-up    Congestive Heart Failure        History of Present Illness:  80 y.o. female with history of Ms. Efrain Hernandez She has a history of atrial fibrillation (on xarelto), TIA, chronic kidney disease, CHF, hypertension, hyperlipidemia, mitral valve disease, hypothyroidism. Her last LVEF was 45% on 7/19/19. 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 Plan is for a PCI in about 30 days. She is ambulatory and with her daughter. She is feeling good but her BP is elevated 160s here and at home. Her torsemide was stopped during and at discharge. She is scheduling follow up with urology for some hematuria. Her weight is staying around 116 at home. No chest pain, shortness of breath, palpitations or edema.   She has several questions and is concerned about the location of the PCI that needs to be done with Dr Darylene Frederic      Past Medical History:   has a past medical history of Allergic rhinitis, cause unspecified, Arthritis, Atrial fibrillation ([address] Bronchopneumonia, CAD (coronary artery disease), Cerebral artery occlusion with cerebral infarction [facility]), Chronic gouty arthropathy, Chronic kidney disease, Congestive heart failure, unspecified, Degeneration of cervical intervertebral disc, Essential and other specified forms of tremor, Gout, HIGH CHOLESTEROL, History of CVA (cerebrovascular accident), Hx of blood clots, Hypertension, Influenza, Leakage of Watchman left atrial appendage closure device, Mitral valve stenosis and aortic valve insufficiency, Movement disorder, Pacemaker, Peptic ulcer, unspecified site, unspecified as acute or chronic, without mention of hemorrhage, perforation, or obstruction, Thyroid disease, Unspecified disorder of kidney and ureter, and Unspecified hypothyroidism. Surgical History:   has a past surgical history that includes back surgery; Cholecystectomy; Cardiac surgery; Coronary artery bypass graft (1987); shoulder surgery; Cardiac catheterization (7/2012); hip surgery (Left, 03/16/2017); joint replacement; Cardiac catheterization (08/07/2018); Percutaneous Transluminal Coronary Angio (11/04/2014); pr njx aa&/strd tfrml epi lumbar/sacral 1 level (Right, 12/3/2018); Femoral-femoral Bypass Graft (N/A, 8/22/2019); femoral bypass (Left, 8/22/2019); and IR KYPHOPLASTY LUMBAR 1 VERTEBRAL BODY (5/14/2021). Social History:   reports that she quit smoking about 35 years ago. Her smoking use included cigarettes. She has a 28.00 pack-year smoking history. She has never used smokeless tobacco. She reports current alcohol use. She reports that she does not use drugs. Family History:   Family History   Problem Relation Age of Onset    Cancer Sister     Heart Disease Sister     Diabetes Brother     Hypertension Brother     Heart Disease Brother     Stroke Maternal Aunt     Heart Disease Maternal Aunt     Diabetes Maternal Uncle     Hypertension Paternal Aunt     Cancer Maternal Grandmother     Cancer Paternal Grandmother     Rheum Arthritis Neg Hx     Lupus Neg Hx        Home Medications:  Prior to Admission medications    Medication Sig Start Date End Date Taking? Authorizing Provider   rosuvastatin (CRESTOR) 5 MG tablet Take 1 tablet by mouth nightly 9/16/22  Yes Yumiko Schneider MD   HYDROcodone-acetaminophen (NORCO) 5-325 MG per tablet Take 1 tablet by mouth 4 times daily as needed for Pain for up to 30 days.  8/22/22 9/21/22 Yes Ivelisse Dumont MD   albuterol sulfate HFA (VENTOLIN HFA) 108 (90 Base) MCG/ACT inhaler Inhale 2 puffs into the lungs 4 times daily as needed for Wheezing 8/17/22  Yes Cesar Herron MD   vitamin D (ERGOCALCIFEROL) 1.25 MG (12058 UT) CAPS capsule TAKE 1 CAPSULE ONCE A WEEK 8/3/22  Yes Cesar Herron MD   clopidogrel (PLAVIX) 75 MG tablet Take 1 tablet by mouth in the morning. 7/20/22  Yes Holly Ram MD   allopurinol (ZYLOPRIM) 100 MG tablet TAKE 1 TABLET EVERY DAY 7/13/22  Yes Cesar Herron MD   levothyroxine (SYNTHROID) 125 MCG tablet Take 1 tablet by mouth Daily 6/28/22  Yes Cesar Herron MD   topiramate (TOPAMAX) 50 MG tablet TAKE 2 TABLETS TWICE DAILY 6/16/22  Yes Cesar Herron MD   nitroGLYCERIN (NITROLINGUAL) 0.4 MG/SPRAY 0.4 mg spray Place 1 spray under the tongue every 5 minutes as needed for Chest pain 2/8/22  Yes Cesar Herron MD   fluticasone [facility]) 50 MCG/ACT nasal spray 2 sprays by Each Nostril route daily 1/25/22  Yes TIFFANIE Ann CNP   metoprolol succinate (TOPROL XL) 25 MG extended release tablet Take 0.5 tablets by mouth daily . 5 tablet daily 12/6/21  Yes TIFFANIE Mendiola CNP   torsemide (DEMADEX) 10 MG tablet 20 mg 2 days of the week and 10 mg the rest 8/17/22 9/16/22  Tory Mcgovern APRN - CNS   aspirin EC 81 MG EC tablet Take 1 tablet by mouth in the morning. Patient not taking: Reported on 9/21/2022 7/20/22   Holly Ram MD        Allergies:  Aspirin, Diltiazem, Diltiazem hcl, Lorazepam, Sulfa antibiotics, Atorvastatin, Dabigatran, Mysoline [primidone], Nsaids, Other, and Primidone     Review of Systems:   Constitutional: there has been no unanticipated weight loss. There's been no change in energy level, sleep pattern, or activity level. Eyes: No visual changes or diplopia. No scleral icterus. ENT: No Headaches, hearing loss or vertigo. No mouth sores or sore throat. Cardiovascular: Reviewed in HPI  Respiratory: No cough or wheezing, no sputum production. No hematemesis.     Gastrointestinal: No abdominal pain, appetite loss, blood in stools. No change in bowel or bladder habits. States she feels bloated   Genitourinary: No dysuria, trouble voiding, or hematuria. Musculoskeletal:  No gait disturbance, weakness or joint complaints. Integumentary: No rash or pruritis. Neurological: No headache, diplopia, change in muscle strength, numbness or tingling. No change in gait, balance, coordination, mood, affect, memory, mentation, behavior. Psychiatric: No anxiety, no depression. Endocrine: No malaise, fatigue or temperature intolerance. No excessive thirst, fluid intake, or urination. No tremor. Hematologic/Lymphatic: No abnormal bruising or bleeding, blood clots or swollen lymph nodes. Allergic/Immunologic: No nasal congestion or hives. Physical Examination:    Vitals:    09/21/22 1258 09/21/22 1302   BP: (!) 158/60 (!) 160/60   Site: Right Upper Arm    Position: Sitting    Cuff Size: Medium Adult    Pulse: 70    SpO2: 97%    Weight: 120 lb (54.4 kg)    Height: 5' 3\" (1.6 m)         Constitutional and General Appearance: Warm and dry, no apparent distress, normal coloration  HEENT:  Normocephalic, atraumatic  Respiratory:  Normal excursion and expansion without use of accessory muscles  Resp Auscultation: Normal breath sounds without dullness  Cardiovascular: The apical impulses not displaced  Heart tones are crisp and normal  Normal JVP  Regular rhythm   Peripheral pulses are symmetrical and full  There is no clubbing, cyanosis of the extremities.   Bilateral lower ankle edema  Pedal Pulses: 2+ and equal   Abdomen:  No masses or tenderness  Liver/Spleen: No Abnormalities Noted  Neurological/Psychiatric:  Alert and oriented in all spheres  Moves all extremities well  Exhibits normal gait balance and coordination  No abnormalities of mood, affect, memory, mentation, or behavior are noted    Lab Data:    CBC:   Lab Results   Component Value Date/Time    WBC 8.4 09/15/2022 05:47 AM    WBC 7.4 09/14/2022 08:08 AM    WBC 8.5 09/13/2022 04:58 AM    RBC 4.93 09/15/2022 05:47 AM    RBC 4.78 09/14/2022 08:08 AM    RBC 4.84 09/13/2022 04:58 AM    HGB 13.2 09/15/2022 05:47 AM    HGB 12.7 09/14/2022 08:08 AM    HGB 12.9 09/13/2022 04:58 AM    HCT 43.0 09/15/2022 05:47 AM    HCT 40.9 09/14/2022 08:08 AM    HCT 41.8 09/13/2022 04:58 AM    MCV 87.1 09/15/2022 05:47 AM    MCV 85.6 09/14/2022 08:08 AM    MCV 86.2 09/13/2022 04:58 AM    RDW 20.4 09/15/2022 05:47 AM    RDW 20.1 09/14/2022 08:08 AM    RDW 20.2 09/13/2022 04:58 AM     09/15/2022 05:47 AM     09/14/2022 08:08 AM     09/13/2022 04:58 AM     BMP:  Lab Results   Component Value Date/Time     09/16/2022 05:35 AM     09/15/2022 05:47 AM     09/14/2022 08:08 AM    K 4.0 09/16/2022 05:35 AM    K 4.0 09/15/2022 05:47 AM    K 3.2 09/14/2022 08:08 AM    K 4.1 09/13/2022 04:58 AM     09/16/2022 05:35 AM     09/15/2022 05:47 AM     09/14/2022 08:08 AM    CO2 20 09/16/2022 05:35 AM    CO2 16 09/15/2022 05:47 AM    CO2 22 09/14/2022 08:08 AM    PHOS 3.2 06/08/2022 02:37 PM    PHOS 4.4 03/17/2022 10:52 AM    PHOS 4.1 12/20/2021 12:46 PM    BUN 25 09/16/2022 05:35 AM    BUN 25 09/15/2022 05:47 AM    BUN 27 09/14/2022 08:08 AM    CREATININE 1.3 09/16/2022 05:35 AM    CREATININE 1.4 09/15/2022 05:47 AM    CREATININE 1.3 09/14/2022 08:08 AM     BNP:   Lab Results   Component Value Date/Time    PROBNP 14,427 09/12/2022 05:17 AM    PROBNP 7,004 09/10/2022 04:34 PM    PROBNP 3,726 08/16/2022 12:15 PM     Cardiac Imaging:  Cardiac CTA: 9/15/22  FINDINGS:   Left atrium:       Watch man device is seen. There is only a small amount of thrombus in the   watch man device. .       The left atrial appendage is not thrombosed. Contrast is seen in the left   atrial appendage, compatible with leakage. .       On axial images 18. There is a crescentic area of contrast flowing around   the watch man device superiorly in the left atrial appendage. Wendie Luo   However, no definite fistulous tract is seen connecting this to the left atrium. Circumflex coronary artery abuts the inferior aspect of the watch man device. There is severe calcified and noncalcified plaque seen in the circumflex. A   definite fistulous tract between the circumflex and the left atrial appendage   is not clearly identified by CT. Robbins Copping Native right coronary artery and left anterior descending coronary artery   heavily calcified. Mediastinum: Heart is enlarged. Thyroid gland unremarkable. Streak artifact   is seen from pacer. Ascending aorta measures 3.5 cm. No intimal flap is seen. No pericardial   effusion. Small hiatal hernia seen. No central pulmonary embolus   identified. Small mediastinal and hilar nodes are noted. Right hilar [place]   conglomerate, measures 1.9 cm x 2.4 cm. .  Calcification or clip seen in the   region of mitral valve. Lungs/Pleura: Respiratory motion artifact limits evaluation of fine pulmonary   parenchymal change. Mild underlying emphysema is seen. De pendant opacity   is seen at the lung bases, likely atelectasis. Pleural calcifications seen   on the left. Right hilar nodes are seen, similar compared to conventional chest CT August 2020       Upper Abdomen: Low attenuation is seen liver, compatible with fatty   infiltration. Soft Tissues/Bones: No acute bone or soft tissue abnormality. Cath: 9/12/22  Findings  Artery Findings/Result   LM 30% ostial to proximal   LAD Patent prox to mid stent, 60% diffuse ostial to mid   Cx 80% prox, fistula noted from Cx proper v Cx branch to left atrium/left atrial appendage (not present on prior angiogram done before placement of Watchman device, suspect erosion), OM1 70% bifurcational, inferior branch of OM1 50% mid,    RI N/A   RCA Mid 100% with R-R collaterals.    L-LAD patent   LVEDP NA   LVG NA      RHC:  RA RV PA FANNIE WP TCO TCI JODI retirement RA% PA%   2 30/3 35/15 23 JULIOCESAR 4.5 3.0 4.7 3.1 70 67%      Intervention(s)  None     Post Cath Dx:       CAD as above  Consider PCI of Cx in future if Cx proper geographically  from [facility] device on CTC    DONA Dr Beranna Tripathi 7/20/2022  Summary   Normal left ventricle size, wall thickness, and systolic function with an   estimated ejection fraction of 55-60%. Moderate mitral regurgitation is present. Left atrial size is dilated. There is a Watchman device seated with a feliciano-device leak measured at 3 mm   on the anterior side. No thrombus seen. Moderate tricuspid regurgitation. Systolic pulmonary artery pressure (SPAP) estimated at 41 mmHg (RA pressure   3 mmHg), consistent with mild pulmonary hypertension. Echo 1/12/2022  Summary   Normal left ventricle size, wall thickness, and systolic function with an   estimated ejection fraction of 55-60%. Mitral valve leaflets appear moderately thickened. The posterior leaflet   appears severely restricted. Moderate mitral regurgitation is present. Mitral annular calcification. Left atrial size is dilated. There is a Watchman device seated with a   feliciano-device leak measured at 2 mm on the anterior side. There is a small an echogenic mass on the surface of the device, consistent   with thrombus near the coumadin ridge. DONA 7/7/2021  Summary   Ejection fraction is visually estimated to be 45-50 %. Mild thickening of anterior, posterior leaflet of mitral valve. There is decreased leaflet motion and \"hockey stick\" appearance of the   mitral leaflets. posterior leaflet is immobile. Mean Gradient 4 mmHg. Moderate mitral regurgitation is present. There is no evidence of mass or thrombus in the left atrium or appendage. Watchman in place. No thrombus. <3 mm leak. The left atrium is dilated. A PFO is present. Aortic valve leaflets appear thickened. Tricuspid aortic valve. Mild aortic regurgitation is present. Plaque is noted in the thoracic aorta.    Moderate tricuspid regurgitation. PASP 51 mmHg    [facility] 3/19/2021 Dr Eliana Singer  Artery Findings/Result   LM Normal   LAD 50% ostial, 50% mid instent   Cx 50% mid at OM1 bifurcation, 30% OM1 mid at bifurcation   RI NA   % prox with multiple R-R collaterals   LVEDP 8   LVG NA due to renal failure      Intervention:         None     Post Cath Dx:       Stable CAD  Possible angina from  of RCA - poor candidate for  intervention with renal failure    Echo 4/30/2020   Left ventricular cavity size is normal. Normal left ventricular wall   thickness. Left ventricular function appears to be reduced with an estimated   ejection fraction of 45%. Diffuse hypokinesis. Echo 7/19/2019   Summary    Left ventricle - normal size, thickness, reduced function with EF of 45%  LV wall motion - diffuse hypokinesis. Mitral valve - thickened, annular calcification, moderate regurgitation  Aortic valve - thickened, calcified, mild regurgitation  Tricuspid valve - mild regurgitation with PASP of 25mmHg  Pulmonic valve - trivial regurgitation   Biatrial enlargement  Pacemaker / ICD lead is visualized in the right heart. 6/2019 Dr Eliana Singer  NSTEMI: . Angiogram findings were as below:      Findings:                      LM       Normal              LAD     50% ostial, mid 100%              Cx        40% OM1 at bifurcation              RCA     Mid 100%              LVG     Not performed              EDP     15 mmHg              L-LAD  Patent              S-PDA Known occluded  Severe Ca++:None  Post Cath Dx:Stable CAD, no apparent culprit for NSTEMI  Intervention:  None  Med Rec:        Continue aggressive med tx                          Continue plavix, no asa due to allergy. statin added. LDL 75   8/7/18  The PCI of complex proximal RCA chronic total occlusion was unsuccessful (J- score 3). Underwent successful Angioplasty of high-grade proximal RCA lesion proximal to the . RECOMMENDATIONS:  Attempt on this complex long  was unsuccessful. Lack   Of retrograde collaterals along with a very ambiguous cap as well as a large RV marginal branch and bridging collateral coming off at the cap makes it a  challenging repeat attempt. If she continues to have angina, it is recommended to proceed with the single-vessel SVG to the RCA. Echo: 2/17/16 ([facility])  Conclusions: Normal LV size and systolic function Mild to moderate mitral  regurgitation Mild tricuspid regurgitation  Findings:   Left Atrium: Mild to moderate enlargement of the left atrium. Left Ventricle: Upper limits of normal left ventricle size. No left ventricular  hypertrophy. Normal left ventricular systolic function. The ejection fraction   Is visually estimated to be 55 %. Right Atrium: Normal right atrial size. RightVentricle: Normal right ventricle size. Normal right ventricular function. Aortic Valve: Tri-leaflet aortic valve. Mild aortic cusp calcification. No  aortic valve stenosis. Mild (1+) aortic valve regurgitation. Aorta: No dilatation of the aortic root. IVC/PA: Normal IVC size and normal respiratory  collapse consistent with normal right atrial pressure. Mitral Valve: Mild mitral valve leaflet calcification. Mild to moderate (2+) mitral valve  regurgitation. No mitral valve stenosis. Tricuspid Valve: Mild (1+) tricuspid  regurgitation. The pulmonary artery pressure is normal.   Pulmonic Valve: Mild  pulmonic regurgitation. Pericardium: Normal pericardium with no significant  pericardial effusion. Assessment:    1. Chronic systolic heart failure (HCC) with improved LVEF on bb; no aldosterone antagonist due to renal insuff   2. Chronic atrial fibrillation s/p watchman Dr Efrain Ellis   3. Coronary artery disease involving autologous artery coronary bypass graft without angina pectoris    4.       Renal insufficiency follows with Dr Pebbles Mijares:   Start torsemide 10 mg on mon wed and fri  Weight self daily and if weight <114 dont take the torsemide  That means, weight yourself daily!!  Call me if BP does not improve  Will schedule an appt with Dr Kamila Swann oct 25th appt    I appreciate the opportunity of cooperating in the care of this individual.    TIFFANIE Freeman - CNS, CNS, 9/21/2022, 1:15 PM

## 2022-09-26 ENCOUNTER — TELEPHONE (OUTPATIENT)
Dept: FAMILY MEDICINE CLINIC | Age: 82
End: 2022-09-26

## 2022-09-26 DIAGNOSIS — M15.9 PRIMARY OSTEOARTHRITIS INVOLVING MULTIPLE JOINTS: ICD-10-CM

## 2022-09-26 RX ORDER — HYDROCODONE BITARTRATE AND ACETAMINOPHEN 5; 325 MG/1; MG/1
1 TABLET ORAL 4 TIMES DAILY PRN
Qty: 120 TABLET | Refills: 0 | Status: SHIPPED | OUTPATIENT
Start: 2022-09-26 | End: 2022-10-26

## 2022-09-26 NOTE — TELEPHONE ENCOUNTER
Called Pt mobile number twice, no answer, just rings and rings. Unable to leave message. Called alternate contact Gisela Lau, phone number disconnected. Called alternate contact Jaja, no answer, LVM. Called alternate contact Yandel, no answer, LVM. Norco refill is in paper prescription she needs to  and bring ID 7:30 to 4:30.

## 2022-09-26 NOTE — TELEPHONE ENCOUNTER
Medication:   Requested Prescriptions     Pending Prescriptions Disp Refills    HYDROcodone-acetaminophen (NORCO) 5-325 MG per tablet 120 tablet 0     Sig: Take 1 tablet by mouth 4 times daily as needed for Pain for up to 30 days. Last Filled:  8/22/2022    Patient Phone Number: [phone] (home) [phone] (work)    Last appt: 8/23/2022   Next appt: 10/28/2022    Last OARRS:   RX Monitoring 6/1/2021   Attestation -   Periodic Controlled Substance Monitoring Possible medication side effects, risk of tolerance/dependence & alternative treatments discussed.      PDMP Monitoring:    Last PDMP Jim Christian as Reviewed [facility]):  Review User Review Instant Review Result   Shantal Dotson 4/21/2022  4:20 PM Reviewed PDMP [1]     Preferred Pharmacy:   [facility] [id number] - [place] [place] [facility] [phone] Anup Desai [phone]  [address] [place] [id number]  Phone: [phone] Fax: [phone]

## 2022-09-28 ASSESSMENT — ENCOUNTER SYMPTOMS
NAUSEA: 0
CHEST TIGHTNESS: 0
CONSTIPATION: 0
DIARRHEA: 0
BACK PAIN: 0
COLOR CHANGE: 0
ABDOMINAL PAIN: 0
VOMITING: 0
SHORTNESS OF BREATH: 1
COUGH: 0

## 2022-10-03 ENCOUNTER — OFFICE VISIT (OUTPATIENT)
Dept: FAMILY MEDICINE CLINIC | Age: 82
End: 2022-10-03
Payer: MEDICARE

## 2022-10-03 VITALS
HEART RATE: 71 BPM | DIASTOLIC BLOOD PRESSURE: 86 MMHG | WEIGHT: 118 LBS | OXYGEN SATURATION: 97 % | BODY MASS INDEX: 20.9 KG/M2 | SYSTOLIC BLOOD PRESSURE: 120 MMHG | TEMPERATURE: 97.4 F

## 2022-10-03 DIAGNOSIS — R59.1 LYMPHADENOPATHY: Primary | ICD-10-CM

## 2022-10-03 PROCEDURE — G8484 FLU IMMUNIZE NO ADMIN: HCPCS | Performed by: NURSE PRACTITIONER

## 2022-10-03 PROCEDURE — 1123F ACP DISCUSS/DSCN MKR DOCD: CPT | Performed by: NURSE PRACTITIONER

## 2022-10-03 PROCEDURE — 99213 OFFICE O/P EST LOW 20 MIN: CPT | Performed by: NURSE PRACTITIONER

## 2022-10-03 PROCEDURE — 1036F TOBACCO NON-USER: CPT | Performed by: NURSE PRACTITIONER

## 2022-10-03 PROCEDURE — G8399 PT W/DXA RESULTS DOCUMENT: HCPCS | Performed by: NURSE PRACTITIONER

## 2022-10-03 PROCEDURE — G8427 DOCREV CUR MEDS BY ELIG CLIN: HCPCS | Performed by: NURSE PRACTITIONER

## 2022-10-03 PROCEDURE — G8420 CALC BMI NORM PARAMETERS: HCPCS | Performed by: NURSE PRACTITIONER

## 2022-10-03 PROCEDURE — 1111F DSCHRG MED/CURRENT MED MERGE: CPT | Performed by: NURSE PRACTITIONER

## 2022-10-03 PROCEDURE — 1090F PRES/ABSN URINE INCON ASSESS: CPT | Performed by: NURSE PRACTITIONER

## 2022-10-03 ASSESSMENT — PATIENT HEALTH QUESTIONNAIRE - PHQ9
SUM OF ALL RESPONSES TO PHQ QUESTIONS 1-9: 0
2. FEELING DOWN, DEPRESSED OR HOPELESS: 0
SUM OF ALL RESPONSES TO PHQ QUESTIONS 1-9: 0
SUM OF ALL RESPONSES TO PHQ QUESTIONS 1-9: 0
1. LITTLE INTEREST OR PLEASURE IN DOING THINGS: 0
SUM OF ALL RESPONSES TO PHQ QUESTIONS 1-9: 0
SUM OF ALL RESPONSES TO PHQ9 QUESTIONS 1 & 2: 0

## 2022-10-03 NOTE — PROGRESS NOTES
Alison Krishnamurthy  : 1940  Encounter date: 10/3/2022    This neva 80 y.o. female who presents with  Chief Complaint   Patient presents with    Other     Lump on either side of neck, not painful x1day       History of present illness:    HPI Pt is 80year old female with daughter for bilateral neck swelling, denies tenderness or difficulty swallowing. Denies ST, nasal drainage or GERD. Reports has had chronic hoarseness, never had endoscopy, denies tooth pain or caries. Current Outpatient Medications on File Prior to Visit   Medication Sig Dispense Refill    HYDROcodone-acetaminophen (NORCO) 5-325 MG per tablet Take 1 tablet by mouth 4 times daily as needed for Pain for up to 30 days. 120 tablet 0    torsemide (DEMADEX) 20 MG tablet 10 mg on MWF 30 tablet 3    rosuvastatin (CRESTOR) 5 MG tablet Take 1 tablet by mouth nightly 30 tablet 0    albuterol sulfate HFA (VENTOLIN HFA) 108 (90 Base) MCG/ACT inhaler Inhale 2 puffs into the lungs 4 times daily as needed for Wheezing 18 g 0    vitamin D (ERGOCALCIFEROL) 1.25 MG (88905 UT) CAPS capsule TAKE 1 CAPSULE ONCE A WEEK 12 capsule 1    clopidogrel (PLAVIX) 75 MG tablet Take 1 tablet by mouth in the morning. 30 tablet 3    allopurinol (ZYLOPRIM) 100 MG tablet TAKE 1 TABLET EVERY DAY 90 tablet 1    levothyroxine (SYNTHROID) 125 MCG tablet Take 1 tablet by mouth Daily 90 tablet 1    topiramate (TOPAMAX) 50 MG tablet TAKE 2 TABLETS TWICE DAILY 360 tablet 1    nitroGLYCERIN (NITROLINGUAL) 0.4 MG/SPRAY 0.4 mg spray Place 1 spray under the tongue every 5 minutes as needed for Chest pain 4.9 g 1    fluticasone (FLONASE) 50 MCG/ACT nasal spray 2 sprays by Each Nostril route daily 16 g 0    metoprolol succinate (TOPROL XL) 25 MG extended release tablet Take 0.5 tablets by mouth daily . 5 tablet daily 45 tablet 0    aspirin EC 81 MG EC tablet Take 1 tablet by mouth in the morning.  (Patient not taking: No sig reported) 90 tablet 1     No current facility-administered medications on file prior to visit.       Allergies   Allergen Reactions    Aspirin Nausea Only    Diltiazem Anaphylaxis    Diltiazem Hcl Anaphylaxis    Lorazepam Other (See Comments)     hallucinations  hallucinations  hallucinations    Sulfa Antibiotics Rash and Hives    Atorvastatin Other (See Comments)     Muscle pains  Muscle pains  Muscle pains    Dabigatran Nausea Only     And indigestion  And indigestion    Aka Pradaxa  And indigestion  And indigestion  And indigestion    Aka Pradaxa  And indigestion  And indigestion  And indigestion    Aka Pradaxa    Mysoline [Primidone]     Nsaids      Other reaction(s): Unknown    Other Other (See Comments)     Nitroglycerin patches causes severe headaches    Primidone      Other reaction(s): Unknown     Past Medical History:   Diagnosis Date    Allergic rhinitis, cause unspecified     Arthritis     Atrial fibrillation ([facility] [address]     Bronchopneumonia     CAD (coronary artery disease)     stent:  post cataract surgery (CABG)    Cerebral artery occlusion with cerebral infarction [facility])     TIA    Chronic gouty arthropathy     Chronic kidney disease     Congestive heart failure, unspecified     Degeneration of cervical intervertebral disc     Essential and other specified forms of tremor     Gout     HIGH CHOLESTEROL     History of CVA (cerebrovascular accident)     Hx of blood clots     Hypertension     Influenza 12/23/2017    Leakage of Watchman left atrial appendage closure device     Mitral valve stenosis and aortic valve insufficiency     Movement disorder     back problems    Pacemaker     Peptic ulcer, unspecified site, unspecified as acute or chronic, without mention of hemorrhage, perforation, or obstruction     Thyroid disease     Unspecified disorder of kidney and ureter     Unspecified hypothyroidism       Past Surgical History:   Procedure Laterality Date    BACK SURGERY      CARDIAC CATHETERIZATION  7/2012    CARDIAC CATHETERIZATION  08/07/2018    Unsuccesful  of RCA    CARDIAC SURGERY      CABG & Cardiac ablation    CHOLECYSTECTOMY      CORONARY ARTERY BYPASS GRAFT      LIMA- Diag/LAD, SVG- RCA    FEMORAL BYPASS Left 2019    LEFT FEMORAL TO POPLITEAL BYPASS GRAFT performed by Sydni Allen MD at [address]    FEMORAL-FEMORAL BYPASS GRAFT N/A 2019    FEMORAL TO FEMORAL BYPASS performed by Sydni Allen MD at [address] Left 2017    Left hip pinning    IR KYPHOPLASTY LUMBAR FIRST LEVEL  2021    IR KYPHOPLASTY LUMBAR FIRST LEVEL 2021 [facility] SPECIAL PROCEDURES    JOINT REPLACEMENT      PA NJX AA&/STRD TFRML EPI LUMBAR/SACRAL 1 LEVEL Right 12/3/2018    RIGHT L3 AND L4 LUMBAR TRANSFORAMINAL EPIRUAL STEROID INJECTION WITH FLUOROSCOPY performed by Rony Wiley MD at [address]    PTCA  2014    MARLENY - 3.0 x 28 to the [address]      left      Family History   Problem Relation Age of Onset    Cancer Sister     Heart Disease Sister     Diabetes Brother     Hypertension Brother     Heart Disease Brother     Stroke Maternal Aunt     Heart Disease Maternal Aunt     Diabetes Maternal Uncle     Hypertension Paternal Aunt     Cancer Maternal Grandmother     Cancer Paternal Grandmother     Rheum Arthritis Neg Hx     Lupus Neg Hx       Social History     Tobacco Use    Smoking status: Former     Packs/day: 1.00     Years: 28.00     Pack years: 28.00     Types: Cigarettes     Quit date: 1987     Years since quittin.7    Smokeless tobacco: Never    Tobacco comments:     H.O.smoking at age 15 / smoked up to 1 p.p.d / quit    Substance Use Topics    Alcohol use: Yes     Comment: social        Review of Systems    Objective:    /86 (Site: Right Upper Arm, Position: Sitting, Cuff Size: Medium Adult)   Pulse 71   Temp 97.4 °F (36.3 °C) (Infrared)   Wt 118 lb (53.5 kg)   SpO2 97%   BMI 20.90 kg/m²   Weight: 118 lb (53.5 kg)     BP Readings from Last 3 Encounters:   10/03/22 120/86   22 (!) 160/60 09/20/22 (!) 137/58     Wt Readings from Last 3 Encounters:   10/03/22 118 lb (53.5 kg)   09/21/22 120 lb (54.4 kg)   09/20/22 116 lb (52.6 kg)     BMI Readings from Last 3 Encounters:   10/03/22 20.90 kg/m²   09/21/22 21.26 kg/m²   09/20/22 20.55 kg/m²       Physical Exam  Vitals reviewed. Constitutional:       Appearance: Normal appearance. She is well-developed. HENT:      Right Ear: Tympanic membrane and ear canal normal.      Left Ear: Tympanic membrane and ear canal normal.   Neck:      Comments: L neck lymphadenopathy  Cardiovascular:      Rate and Rhythm: Normal rate and regular rhythm. Heart sounds: Normal heart sounds. No murmur heard. Pulmonary:      Effort: Pulmonary effort is normal.      Breath sounds: Normal breath sounds. Musculoskeletal:      Cervical back: Neck supple. No tenderness. Lymphadenopathy:      Cervical: No cervical adenopathy. Skin:     General: Skin is warm and dry. Neurological:      Mental Status: She is alert and oriented to person, place, and time. Psychiatric:         Mood and Affect: Mood normal.       Assessment/Plan    1. Lymphadenopathy  - US HEAD NECK SOFT TISSUE THYROID; Future    Return if symptoms worsen or fail to improve, for unresolved symptoms. This dictation was generated by voice recognition computer software. Although all attempts are made to edit the dictation for accuracy, there may be errors in the transcription that are not intended.

## 2022-10-10 PROBLEM — R06.09 DOE (DYSPNEA ON EXERTION): Status: RESOLVED | Noted: 2022-09-10 | Resolved: 2022-10-10

## 2022-10-10 PROBLEM — R42 DIZZINESS: Status: RESOLVED | Noted: 2020-02-06 | Resolved: 2022-10-10

## 2022-10-10 RX ORDER — TOPIRAMATE 50 MG/1
TABLET, FILM COATED ORAL
Qty: 360 TABLET | Refills: 0 | Status: SHIPPED | OUTPATIENT
Start: 2022-10-10

## 2022-10-10 NOTE — TELEPHONE ENCOUNTER
Pt's daughter called requesting refill of topamax 50mg. Pt's daughter stated pt is completely out of medication.  Please send to [facility] in [place] on [address] or call daughter if any questions

## 2022-10-10 NOTE — PROGRESS NOTES
[facility]   Electrophysiology  Shahrzad Kaiser, APRN-CNP  Attending EP: Dr. Louisa Torres    Date: 11/7/2022  I had the privilege of visiting Glenroy Mott in the office. Chief Complaint:   Chief Complaint   Patient presents with    Atrial Fibrillation    Hypertension       History of Present Illness: History obtained from patient and medical record. Glenroy Mott is 80 y.o. female with a past medical history of CAD s/p CABG, PAD, HTN, HLD, CKD, CHF, atrial fibrillation, SSS s/p PPM (11/13), and TIA. S/p Watchman (5/17/21)    -Interval history: Today, Glenroy Mott is being seen for routine follow up. Her daughter is present for the visit. She feels much better since her stent placement a few weeks ago. Her SOB is much improved. Her device shows normal function. She has low AF burden. We discussed need for repeat DONA to ensure no feliciano-device leak. Denies having chest pain, palpitations, shortness of breath, orthopnea/PND, cough, or dizziness at the time of this visit. With regard to medication therapy the patient has been compliant with prescribed regimen. They have tolerated therapy to date. Allergies: Allergies   Allergen Reactions    Aspirin Nausea Only    Diltiazem Anaphylaxis    Diltiazem Hcl Anaphylaxis    Lorazepam Other (See Comments)     hallucinations  hallucinations  hallucinations    Sulfa Antibiotics Rash and Hives    Atorvastatin Other (See Comments)     Muscle pains  Muscle pains  Muscle pains    Dabigatran Nausea Only     And indigestion  And indigestion    Aka Pradaxa  And indigestion  And indigestion  And indigestion    Aka Pradaxa  And indigestion  And indigestion  And indigestion    Aka Pradaxa    Mysoline [Primidone]     Nsaids      Other reaction(s): Unknown    Other Other (See Comments)     Nitroglycerin patches causes severe headaches    Primidone      Other reaction(s): Unknown     Home Medications:  Prior to Visit Medications    Medication Sig Taking? Authorizing Provider   rosuvastatin (CRESTOR) 5 MG tablet Take 1 tablet by mouth nightly Yes Chai Apple MD   carvedilol (COREG) 25 MG tablet Take 1 tablet by mouth 2 times daily (with meals)  Patient taking differently: Take 25 mg by mouth 2 times daily (with meals) Per heart doc only to take 2 times if not under 110 BP Yes Jameson Montalvo APRN - CNP   aspirin EC 81 MG EC tablet Take 1 tablet by mouth daily Yes Gilda Carbone MD   topiramate (TOPAMAX) 50 MG tablet TAKE 2 TABLETS TWICE DAILY Yes Chai Apple MD   torsemide (DEMADEX) 20 MG tablet 10 mg on MWF Yes Tory Mcgovern APRN - CNS   albuterol sulfate HFA (VENTOLIN HFA) 108 (90 Base) MCG/ACT inhaler Inhale 2 puffs into the lungs 4 times daily as needed for Wheezing Yes Chai Apple MD   vitamin D (ERGOCALCIFEROL) 1.25 MG (55966 UT) CAPS capsule TAKE 1 CAPSULE ONCE A WEEK Yes Chai Apple MD   clopidogrel (PLAVIX) 75 MG tablet Take 1 tablet by mouth in the morning.  Yes Lenny Bustillo MD   allopurinol (ZYLOPRIM) 100 MG tablet TAKE 1 TABLET EVERY DAY Yes Chai Apple MD   levothyroxine (SYNTHROID) 125 MCG tablet Take 1 tablet by mouth Daily Yes Chai Apple MD   nitroGLYCERIN (NITROLINGUAL) 0.4 MG/SPRAY 0.4 mg spray Place 1 spray under the tongue every 5 minutes as needed for Chest pain Yes Chai Apple MD   fluticasone (FLONASE) 50 MCG/ACT nasal spray 2 sprays by Each Nostril route daily Yes Corbin De Santiago APRN - CNP      Past Medical History:  Past Medical History:   Diagnosis Date    Allergic rhinitis, cause unspecified     Arthritis     Atrial fibrillation ([address]     Bronchopneumonia     CAD (coronary artery disease)     stent:  post cataract surgery (CABG)    Cerebral artery occlusion with cerebral infarction [facility])     TIA    Chronic gouty arthropathy     Chronic kidney disease     Congestive heart failure, unspecified     Degeneration of cervical intervertebral disc     Essential and other specified forms of tremor     Gout     HIGH CHOLESTEROL     History of CVA (cerebrovascular accident)     Hx of blood clots     Hypertension     Influenza 12/23/2017    Leakage of Watchman left atrial appendage closure device     Mitral valve stenosis and aortic valve insufficiency     Movement disorder     back problems    Pacemaker     Peptic ulcer, unspecified site, unspecified as acute or chronic, without mention of hemorrhage, perforation, or obstruction     Thyroid disease     Unspecified disorder of kidney and ureter     Unspecified hypothyroidism      Past Surgical History:    has a past surgical history that includes back surgery; Cholecystectomy; Cardiac surgery; Coronary artery bypass graft (1987); shoulder surgery; Cardiac catheterization (7/2012); hip surgery (Left, 03/16/2017); joint replacement; Cardiac catheterization (08/07/2018); Percutaneous Transluminal Coronary Angio (11/04/2014); pr njx aa&/strd tfrml epi lumbar/sacral 1 level (Right, 12/3/2018); Femoral-femoral Bypass Graft (N/A, 8/22/2019); femoral bypass (Left, 8/22/2019); and IR KYPHOPLASTY LUMBAR 1 VERTEBRAL BODY (5/14/2021). Social History:  Reviewed. reports that she quit smoking about 35 years ago. Her smoking use included cigarettes. She has a 28.00 pack-year smoking history. She has never used smokeless tobacco. She reports current alcohol use. She reports that she does not use drugs. Family History:  Reviewed. family history includes Cancer in her maternal grandmother, paternal grandmother, and sister; Diabetes in her brother and maternal uncle; Heart Disease in her brother, maternal aunt, and sister; Hypertension in her brother and paternal aunt; Stroke in her maternal aunt. Review of System:  Constitutional: Negative for fever, night sweats, chills, weight changes, or weakness  Skin: Positive for bruising.  Negative for rash, dry skin, pruritus, bleeding, blood clots, or changes in skin pigment  HEENT: Negative for vision changes, ringing in the ears, sore throat, dysphagia, or swollen lymph nodes  Respiratory: Reviewed in HPI  Cardiovascular: Reviewed in HPI  Gastrointestinal: Negative for abdominal pain, N/V/D, constipation, or black/tarry stools  Genito-Urinary: Negative for dysuria, incontinence, urgency, or hematuria  Musculoskeletal: Negative for joint swelling, muscle pain, or injuries  Neurological/Psych: Negative for confusion, seizures, dizziness, headaches, balance issues or TIA-like symptoms. No anxiety, depression, or insomnia    Physical Examination:  Vitals:    11/07/22 1428   BP: 120/70   Pulse: 73   SpO2: 99%        Wt Readings from Last 3 Encounters:   11/07/22 114 lb 6.4 oz (51.9 kg)   10/28/22 116 lb (52.6 kg)   10/25/22 117 lb (53.1 kg)     Constitutional: Cooperative and in no apparent distress, and appears stated age  Skin: Warm and pink; no pallor, cyanosis, or clubbing. Scattered bruising on extremities. HEENT: Symmetric and normocephalic. PERRL, EOM intact. Conjunctiva pink with clear sclera. Mucus membranes pink and moist. Teeth intact. Thyroid smooth without nodules or goiter  Respiratory: Respirations symmetric and unlabored. Lungs clear to auscultation bilaterally, no wheezing, rhonchi, or crackles  Cardiovascular:  Regular rate and rhythm. S1/S2 present without murmurs, rubs, or gallops. Peripheral pulses 2+, capillary refill < 3 seconds. No elevation of JVP. No peripheral edema  Gastrointestinal: Abdomen soft and round. Bowel sounds normoactive in all quadrants without tenderness or masses. Musculoskeletal: Bilateral upper and lower extremity strength 5/5 with full ROM. Neurological/Psych: Awake and orientated to person, place and time. Calm affect, appropriate mood.      Pertinent labs, diagnostic, device, and imaging results reviewed as a part of this visit    LABS    CBC:   Lab Results   Component Value Date    WBC 9.2 10/20/2022    HGB 12.1 10/20/2022    HCT 39.2 10/20/2022    MCV 84.0 10/20/2022     10/20/2022     BMP:   Lab Results   Component Value Date    CREATININE 1.2 10/21/2022    BUN 19 10/21/2022     10/21/2022    K 3.5 10/21/2022     (H) 10/21/2022    CO2 19 (L) 10/21/2022     Estimated Creatinine Clearance: 30 mL/min (based on SCr of 1.2 mg/dL). Thyroid:   Lab Results   Component Value Date    TSH 0.40 2022    K4HGEYA 0.89 2022    G1TNXOO 7.3 10/03/2018     Lipid Panel:   Lab Results   Component Value Date/Time    CHOL 135 2022 04:22 AM    HDL 27 2022 04:22 AM    HDL 31 2012 08:49 AM    TRIG 227 2022 04:22 AM     LFTs:  Lab Results   Component Value Date    ALT 9 (L) 2022    AST 19 2022    ALKPHOS 110 2022    BILITOT 0.6 2022     Coags:   Lab Results   Component Value Date    PROTIME 13.5 (H) 2022    INR 1.19 (H) 2022    APTT 42.5 (H) 2022       EC2022  - Atrial paced. Rate 78, , Qtc 441    DONA:    Normal left ventricle size, wall thickness, and systolic function with an   estimated ejection fraction of 55-60%. Moderate mitral regurgitation is present. Left atrial size is dilated. There is a Watchman device seated with a feliciano-device leak measured at 3 mm   on the anterior side. No thrombus seen. Moderate tricuspid regurgitation. Systolic pulmonary artery pressure (SPAP) estimated at 41 mmHg (RA pressure   3 mmHg), consistent with mild pulmonary hypertension. GXT:    Small-medium sized anterolateral partial reversibility defect of moderate    intensity consistent with ischemia and infarction in the territory of the    mid and distal LAD and/or LCx . Mild global hypokinesis with EF 44%     Cath: 10/22  LM Ulcerated, 80% to mid, eccentric  LAD NA  Cx 80% prox  RI N/A  RCA N/A  LVEDP    LVG       Intervention(s)  Artery Location Intervention Result  LM Os-prox Xience 3.5X12 (PD with 4. 0NC to 18atm) No residual  Cx prox Xience 3.5X18  No residual Assessment:    1. Paroxysmal Atrial Fibrillation  - S/p Watchman Implantation (5/17/21)   ~ 3 mm feliciano-device leak on DONA (7/22)    - Currently A-paced   ~ Low AT/AF burden on device    - Continue coreg 25 mg QD  - [id number] score:high; [id number] Vasc score and anticoagulation discussed. High risk for stroke and thromboembolism. Anticoagulation is recommended. Risk of bleeding was discussed.  ~ On ASA/plavix s/p Watchman DONA. No need for anti-coagulation from EP standpoint    - Afib risk factors including age, HTN, obesity, inactivity and UVALDO were discussed with patient. Risk factor modification recommended     - Discussed with Dr. Ben Roth as she had 3 mm leak on DONA in July. Will repeat DONA at 6 months (January 2023) from prior DONA to reassess feliciano-device leak    2. Sick Sinus Syndrome  - S/p Dual chamber [employer] PPM (11/13); s/p gen change (1/21)  - I reviewed device interrogation today. Device functioning normally.   ~ A-paced 88% V-paced 6%  ~ 12.5 years left on battery life expectancy  - Discussed with patient  - Follow up with device clinic as scheduled    3. Non-sustained atrial tachycardia  - Detected by device  - Stable  - On BB    4. Chronic systolic heart failure (NYHA Class III)  - Appears compensated   ~ EF 55% per DONA (7/22)  - Continue with coreg 25 mg BID, torsemide 10 mg/20 mg daily alternating  ~ ACE-I/ARB contraindicated due to renal insufficiency and risk of hyperkalemia  - Aggressive medical therapy with risk factor modification  - Discussed with patient importance of monitoring weight, low sodium diet and fluid restriction  - Followed by CHF team    5. CAD  - Hx of CABG   ~ S/p PCI to CX and LAD (10/22)    - Stable  - No complaints of angina  - Continue ASA, Plavix, BB, and statin   ~ No ACE/ARB due to CKD  - Followed by Dr. Noreen Hernandez    6.  HTN  - Controlled: Goal <140/80  - Continue current medications  - Encouraged to monitor and log BP readings at home, then bring log to next visit  - Discussed importance of low sodium diet, weight control and exercise    Plan:  1. No changes  2. Schedule for DONA in January  3. Call office if any issues    F/U: Follow-up with CHF team as scheduled and EP in 6 months  -Follow up with device clinic as scheduled  -Call [facility] at [phone] with any questions    Diet & Exercise: The patient is counseled to follow a low salt diet to assure blood pressure remains controlled for cardiovascular risk factor modification  The patient is counseled to avoid excess caffeine, and energy drinks as this may exacerbated ectopy and arrhythmia  The patient is counseled to lose weight to control cardiovascular risk factors  Exercise program discussed: To improve overall cardiovascular health, the patient is instructed to increase cardiovascular related activities with a goal of 150 min/week of moderate level activity or 10,000 steps per day. Encouraged to perform as much activity as tolerated    I have addressed the patient's cardiac risk factors and adjusted pharmacologic treatment as needed. In addition, I have reinforced the need for patient directed risk factor modification. I independently reviewed the device check interrogation and ECG    All questions and concerns were addressed with the patient. Alternatives to treatment were discussed. Thank you for allowing to us to participate in the care of Patt Ganser. Time  30 minutes spent preparing to see patient including reviewing patient history/prior tests/prior consults, performing a medical exam, counseling and educating patient/family/caregiver, ordering medications/tests/procedures, referring and communicating with PCPs and other pertinent consultants, documenting information in the EMR, independently interpreting results and communicating to family and coordination of patient care.     Colton Love, TIFFANIE-CNP  [facility]   Office: [phone]

## 2022-10-12 ENCOUNTER — CARE COORDINATION (OUTPATIENT)
Dept: CARE COORDINATION | Age: 82
End: 2022-10-12

## 2022-10-12 NOTE — CARE COORDINATION
ACM made outreach attempt to patient to re-evaluate need for Care Management. UTR - phone rings with no answer no VM, ACM will attempt at a later date/time.

## 2022-10-14 NOTE — PROGRESS NOTES
[facility]  H+P  Consult  OP Visit  FU Visit   CC HX HPI   GEN  Doing fair. Sob with exertion    CAD CABG, PCI Ø CP. Continued sob. Need for staged PCI of Cx, delayed to assess maturation of fistula. Afib Watchman    MED  Compliant with CV meds. Ø reported SA. HISTORY/ALLERGY/ROS   MEDHx  has a past medical history of Allergic rhinitis, cause unspecified, Arthritis, Atrial fibrillation (HCC), Bronchopneumonia, CAD (coronary artery disease), Cerebral artery occlusion with cerebral infarction ([facility] [address] Chronic gouty arthropathy, Chronic kidney disease, Congestive heart failure, unspecified, Degeneration of cervical intervertebral disc, Essential and other specified forms of tremor, Gout, HIGH CHOLESTEROL, History of CVA (cerebrovascular accident), Hx of blood clots, Hypertension, Influenza, Leakage of Watchman left atrial appendage closure device, Mitral valve stenosis and aortic valve insufficiency, Movement disorder, Pacemaker, Peptic ulcer, unspecified site, unspecified as acute or chronic, without mention of hemorrhage, perforation, or obstruction, Thyroid disease, Unspecified disorder of kidney and ureter, and Unspecified hypothyroidism. SURGHx  has a past surgical history that includes back surgery; Cholecystectomy; Cardiac surgery; Coronary artery bypass graft (1987); shoulder surgery; Cardiac catheterization (7/2012); hip surgery (Left, 03/16/2017); joint replacement; Cardiac catheterization (08/07/2018); Percutaneous Transluminal Coronary Angio (11/04/2014); pr njx aa&/strd tfrml epi lumbar/sacral 1 level (Right, 12/3/2018); Femoral-femoral Bypass Graft (N/A, 8/22/2019); femoral bypass (Left, 8/22/2019); and IR KYPHOPLASTY LUMBAR 1 VERTEBRAL BODY (5/14/2021). SOCHx  reports that she quit smoking about 35 years ago. Her smoking use included cigarettes. She has a 28.00 pack-year smoking history. She has never used smokeless tobacco. She reports current alcohol use.  She reports that she does not use drugs. FAMHx family history includes Cancer in her maternal grandmother, paternal grandmother, and sister; Diabetes in her brother and maternal uncle; Heart Disease in her brother, maternal aunt, and sister; Hypertension in her brother and paternal aunt; Stroke in her maternal aunt. ALLERG Aspirin, Diltiazem, Diltiazem hcl, Lorazepam, Sulfa antibiotics, Atorvastatin, Dabigatran, Mysoline [primidone], Nsaids, Other, and Primidone   ROS Full ROS obtained and negative except as mentioned in HPI   MEDICATIONS   Current Outpatient Medications   Medication Sig Dispense Refill    topiramate (TOPAMAX) 50 MG tablet TAKE 2 TABLETS TWICE DAILY 360 tablet 0    HYDROcodone-acetaminophen (NORCO) 5-325 MG per tablet Take 1 tablet by mouth 4 times daily as needed for Pain for up to 30 days. 120 tablet 0    torsemide (DEMADEX) 20 MG tablet 10 mg on MWF 30 tablet 3    rosuvastatin (CRESTOR) 5 MG tablet Take 1 tablet by mouth nightly 30 tablet 0    albuterol sulfate HFA (VENTOLIN HFA) 108 (90 Base) MCG/ACT inhaler Inhale 2 puffs into the lungs 4 times daily as needed for Wheezing 18 g 0    vitamin D (ERGOCALCIFEROL) 1.25 MG (85371 UT) CAPS capsule TAKE 1 CAPSULE ONCE A WEEK 12 capsule 1    aspirin EC 81 MG EC tablet Take 1 tablet by mouth in the morning. (Patient not taking: No sig reported) 90 tablet 1    clopidogrel (PLAVIX) 75 MG tablet Take 1 tablet by mouth in the morning. 30 tablet 3    allopurinol (ZYLOPRIM) 100 MG tablet TAKE 1 TABLET EVERY DAY 90 tablet 1    levothyroxine (SYNTHROID) 125 MCG tablet Take 1 tablet by mouth Daily 90 tablet 1    nitroGLYCERIN (NITROLINGUAL) 0.4 MG/SPRAY 0.4 mg spray Place 1 spray under the tongue every 5 minutes as needed for Chest pain 4.9 g 1    fluticasone (FLONASE) 50 MCG/ACT nasal spray 2 sprays by Each Nostril route daily 16 g 0    metoprolol succinate (TOPROL XL) 25 MG extended release tablet Take 0.5 tablets by mouth daily . 5 tablet daily 45 tablet 0     No current facility-administered medications for this visit. PHYSICAL EXAM   Vitals /70 (Site: Right Upper Arm, Position: Sitting, Cuff Size: Medium Adult)   Pulse 70   Ht 5' 3\" (1.6 m)   Wt 116 lb 12.8 oz (53 kg)   SpO2 99%   BMI 20.69 kg/m²     Gen Alert, coop, no distress Heart  Rrr, no mrg   Head NC, AT, no abnorm Abd  Soft, NT, +BS, no mass, no OM   Eyes PER, conj/corn clear Ext  Ext nl, AT, no C/C/E   Nose Nares nl, no drain, NT Pulse 2+ and symmetric   Throat Lips, mucosa, tongue nl Skin Col/text/turg nl, no vis rash/les   Neck S/S, TM, NT, no bruit/JVD Psych Nl mood and affect   Lung CTA-B, unlabored, no DTP Lymph   No cervical or axillary LA   Ch wall NT, no deform Neuro  Nl gross M/S exam      CODING   SCI (57604) - CAD, AF, HTN, CHOL  30-39 minutes preparing to see pt including review hx, tests, consults, perf exam, , educating pt, fam, caregiver, ordering meds/tests/procedures, referring and comm with pcps and other consultants, documenting info in EMR, interpreting results and communicating to fam and coordination of pt care. SCRIBE   Nurse - Scribe Attestation  ALBERTO [employer], am scribing for and in the presence of Lorrie Cole MD.   Signed, [employer], RN. Doctor - Provider Trevor Riley is working as a scribe for and in the presence of lucinda Cole MD). Working as a scribe, [employer] may have prepopulated components of this note with my historical  intellectual property under my direct supervision. Any additions to this intellectual property were performed in my presence and at my direction. Furthermore, the content and accuracy of this note have been reviewed by me Lorrie Cole MD).   10/17/2022 11:30 AM   ASSESSMENT AND PLAN   *CAD   Date EF Results   Sx 1987  CABGx3   [facility] 2/13  11/14  2/17    8/18  3/21  9/22     60%    N/A  N/A PCI D1  PCI D1 instent stenosis  % prox, S-%, % prox, D1 stent patent, LIMA to LAD  Angioplasty of high-grade proximal RCA lesion proximal Ingrid Yonye)  No intervention Redd Paolo)  No intervention Redd Britt) ([facility])   MPI 8/22 44% Small-medium sized anterolateral partial reversibility defect of moderate intensity consistent with ischemia and infarction in the territory of the mid and distal LAD and/or LCx . Mild global hypokinesis   TTE 1996 7/12 7/19 7/21 1/22 7/22 >55%  55%  45%  50%    60%      60%   Mod MR,  decreased leaflet motion and \"hockey stick\" appearance of the mitral leaflets. Posterior, watchman in place, no thrombus, PFO present, mod TR  Mod MR,watchman device seated with feliciano-device leak, small an echogenic mass on the surface of the device, consistent with thrombus near the coumadin ridge. Mod TR  Mod MR, , watchman device seated with feliciano-device leak 3mm, mod TR, SPAP 41mmHg,    Plan   Continue aggressive medical treatment at doses above  PCI of Cx IF fistula character unchanged.     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

## 2022-10-14 NOTE — PATIENT INSTRUCTIONS
Schedule angiogram  Hold your diuretic (Torsemide) the morning of  Nothing to eat or drink after midnight   You can take all your other morning medications with sips of water including Aspirin and Plavix  You will need someone to drive you

## 2022-10-17 ENCOUNTER — OFFICE VISIT (OUTPATIENT)
Dept: CARDIOLOGY CLINIC | Age: 82
End: 2022-10-17
Payer: MEDICARE

## 2022-10-17 VITALS
HEART RATE: 70 BPM | SYSTOLIC BLOOD PRESSURE: 110 MMHG | DIASTOLIC BLOOD PRESSURE: 70 MMHG | OXYGEN SATURATION: 99 % | HEIGHT: 63 IN | BODY MASS INDEX: 20.7 KG/M2 | WEIGHT: 116.8 LBS

## 2022-10-17 DIAGNOSIS — I25.83 CORONARY ARTERY DISEASE DUE TO LIPID RICH PLAQUE: Primary | ICD-10-CM

## 2022-10-17 DIAGNOSIS — I25.10 CORONARY ARTERY DISEASE DUE TO LIPID RICH PLAQUE: Primary | ICD-10-CM

## 2022-10-17 DIAGNOSIS — I25.810 CORONARY ARTERY DISEASE INVOLVING AUTOLOGOUS ARTERY CORONARY BYPASS GRAFT WITHOUT ANGINA PECTORIS: ICD-10-CM

## 2022-10-17 DIAGNOSIS — I48.20 CHRONIC ATRIAL FIBRILLATION (HCC): ICD-10-CM

## 2022-10-17 PROCEDURE — 99214 OFFICE O/P EST MOD 30 MIN: CPT | Performed by: INTERNAL MEDICINE

## 2022-10-17 PROCEDURE — G8399 PT W/DXA RESULTS DOCUMENT: HCPCS | Performed by: INTERNAL MEDICINE

## 2022-10-17 PROCEDURE — G8427 DOCREV CUR MEDS BY ELIG CLIN: HCPCS | Performed by: INTERNAL MEDICINE

## 2022-10-17 PROCEDURE — G8420 CALC BMI NORM PARAMETERS: HCPCS | Performed by: INTERNAL MEDICINE

## 2022-10-17 PROCEDURE — G8484 FLU IMMUNIZE NO ADMIN: HCPCS | Performed by: INTERNAL MEDICINE

## 2022-10-17 PROCEDURE — 1123F ACP DISCUSS/DSCN MKR DOCD: CPT | Performed by: INTERNAL MEDICINE

## 2022-10-17 PROCEDURE — 1036F TOBACCO NON-USER: CPT | Performed by: INTERNAL MEDICINE

## 2022-10-17 PROCEDURE — 1090F PRES/ABSN URINE INCON ASSESS: CPT | Performed by: INTERNAL MEDICINE

## 2022-10-17 RX ORDER — ASPIRIN 81 MG/1
81 TABLET ORAL DAILY
Qty: 90 TABLET | Refills: 1 | Status: SHIPPED | OUTPATIENT
Start: 2022-10-17

## 2022-10-19 ENCOUNTER — HOSPITAL ENCOUNTER (INPATIENT)
Dept: CARDIAC CATH/INVASIVE PROCEDURES | Age: 82
LOS: 1 days | Discharge: HOME | DRG: 247 | End: 2022-10-21
Attending: INTERNAL MEDICINE | Admitting: INTERNAL MEDICINE
Payer: MEDICARE

## 2022-10-19 ENCOUNTER — CARE COORDINATION (OUTPATIENT)
Dept: CARE COORDINATION | Age: 82
End: 2022-10-19

## 2022-10-19 PROBLEM — I20.0 UNSTABLE ANGINA (HCC): Status: ACTIVE | Noted: 2022-10-19

## 2022-10-19 LAB
ANION GAP SERPL CALCULATED.3IONS-SCNC: 11 MMOL/L (ref 3–16)
BUN BLDV-MCNC: 22 MG/DL (ref 7–20)
CALCIUM SERPL-MCNC: 9.5 MG/DL (ref 8.3–10.6)
CHLORIDE BLD-SCNC: 114 MMOL/L (ref 99–110)
CO2: 20 MMOL/L (ref 21–32)
CREAT SERPL-MCNC: 1.1 MG/DL (ref 0.6–1.2)
EKG ATRIAL RATE: 73 BPM
EKG DIAGNOSIS: NORMAL
EKG P AXIS: 63 DEGREES
EKG P-R INTERVAL: 352 MS
EKG Q-T INTERVAL: 442 MS
EKG QRS DURATION: 104 MS
EKG QTC CALCULATION (BAZETT): 486 MS
EKG R AXIS: -4 DEGREES
EKG T AXIS: 96 DEGREES
EKG VENTRICULAR RATE: 73 BPM
GFR SERPL CREATININE-BSD FRML MDRD: 50 ML/MIN/{1.73_M2}
GLUCOSE BLD-MCNC: 84 MG/DL (ref 70–99)
HCT VFR BLD CALC: 42.8 % (ref 36–48)
HEMOGLOBIN: 13.3 G/DL (ref 12–16)
MCH RBC QN AUTO: 25.9 PG (ref 26–34)
MCHC RBC AUTO-ENTMCNC: 31.1 G/DL (ref 31–36)
MCV RBC AUTO: 83.5 FL (ref 80–100)
PDW BLD-RTO: 19.5 % (ref 12.4–15.4)
PLATELET # BLD: 332 K/UL (ref 135–450)
PMV BLD AUTO: 9.4 FL (ref 5–10.5)
POC ACT LR: 206 SEC
POC ACT LR: 229 SEC
POC ACT LR: 260 SEC
POTASSIUM SERPL-SCNC: 3.6 MMOL/L (ref 3.5–5.1)
RBC # BLD: 5.13 M/UL (ref 4–5.2)
SODIUM BLD-SCNC: 145 MMOL/L (ref 136–145)
WBC # BLD: 9.2 K/UL (ref 4–11)

## 2022-10-19 PROCEDURE — 85347 COAGULATION TIME ACTIVATED: CPT

## 2022-10-19 PROCEDURE — C9600 PERC DRUG-EL COR STENT SING: HCPCS

## 2022-10-19 PROCEDURE — 93454 CORONARY ARTERY ANGIO S&I: CPT | Performed by: INTERNAL MEDICINE

## 2022-10-19 PROCEDURE — G0378 HOSPITAL OBSERVATION PER HR: HCPCS

## 2022-10-19 PROCEDURE — 2500000003 HC RX 250 WO HCPCS

## 2022-10-19 PROCEDURE — C1874 STENT, COATED/COV W/DEL SYS: HCPCS

## 2022-10-19 PROCEDURE — 85027 COMPLETE CBC AUTOMATED: CPT

## 2022-10-19 PROCEDURE — 6360000002 HC RX W HCPCS: Performed by: INTERNAL MEDICINE

## 2022-10-19 PROCEDURE — C1725 CATH, TRANSLUMIN NON-LASER: HCPCS

## 2022-10-19 PROCEDURE — 93005 ELECTROCARDIOGRAM TRACING: CPT | Performed by: INTERNAL MEDICINE

## 2022-10-19 PROCEDURE — 6360000002 HC RX W HCPCS

## 2022-10-19 PROCEDURE — 93010 ELECTROCARDIOGRAM REPORT: CPT | Performed by: INTERNAL MEDICINE

## 2022-10-19 PROCEDURE — B2131ZZ FLUOROSCOPY OF MULTIPLE CORONARY ARTERY BYPASS GRAFTS USING LOW OSMOLAR CONTRAST: ICD-10-PCS | Performed by: INTERNAL MEDICINE

## 2022-10-19 PROCEDURE — B2111ZZ FLUOROSCOPY OF MULTIPLE CORONARY ARTERIES USING LOW OSMOLAR CONTRAST: ICD-10-PCS | Performed by: INTERNAL MEDICINE

## 2022-10-19 PROCEDURE — 6370000000 HC RX 637 (ALT 250 FOR IP): Performed by: INTERNAL MEDICINE

## 2022-10-19 PROCEDURE — 2000000000 HC ICU R&B

## 2022-10-19 PROCEDURE — 80048 BASIC METABOLIC PNL TOTAL CA: CPT

## 2022-10-19 PROCEDURE — 2709999900 HC NON-CHARGEABLE SUPPLY

## 2022-10-19 PROCEDURE — 6360000004 HC RX CONTRAST MEDICATION: Performed by: INTERNAL MEDICINE

## 2022-10-19 PROCEDURE — 99152 MOD SED SAME PHYS/QHP 5/>YRS: CPT | Performed by: INTERNAL MEDICINE

## 2022-10-19 PROCEDURE — 92928 PRQ TCAT PLMT NTRAC ST 1 LES: CPT | Performed by: INTERNAL MEDICINE

## 2022-10-19 PROCEDURE — C1769 GUIDE WIRE: HCPCS

## 2022-10-19 PROCEDURE — C1887 CATHETER, GUIDING: HCPCS

## 2022-10-19 PROCEDURE — 36415 COLL VENOUS BLD VENIPUNCTURE: CPT

## 2022-10-19 PROCEDURE — 99153 MOD SED SAME PHYS/QHP EA: CPT

## 2022-10-19 PROCEDURE — 99152 MOD SED SAME PHYS/QHP 5/>YRS: CPT

## 2022-10-19 PROCEDURE — 027135Z DILATION OF CORONARY ARTERY, TWO ARTERIES WITH TWO DRUG-ELUTING INTRALUMINAL DEVICES, PERCUTANEOUS APPROACH: ICD-10-PCS | Performed by: INTERNAL MEDICINE

## 2022-10-19 PROCEDURE — 2500000003 HC RX 250 WO HCPCS: Performed by: INTERNAL MEDICINE

## 2022-10-19 PROCEDURE — C1894 INTRO/SHEATH, NON-LASER: HCPCS

## 2022-10-19 PROCEDURE — 4A023N7 MEASUREMENT OF CARDIAC SAMPLING AND PRESSURE, LEFT HEART, PERCUTANEOUS APPROACH: ICD-10-PCS | Performed by: INTERNAL MEDICINE

## 2022-10-19 RX ORDER — SODIUM CHLORIDE 0.9 % (FLUSH) 0.9 %
5-40 SYRINGE (ML) INJECTION EVERY 12 HOURS SCHEDULED
Status: DISCONTINUED | OUTPATIENT
Start: 2022-10-19 | End: 2022-10-21 | Stop reason: HOSPADM

## 2022-10-19 RX ORDER — ONDANSETRON 2 MG/ML
4 INJECTION INTRAMUSCULAR; INTRAVENOUS EVERY 6 HOURS PRN
Status: DISCONTINUED | OUTPATIENT
Start: 2022-10-19 | End: 2022-10-21 | Stop reason: HOSPADM

## 2022-10-19 RX ORDER — ROSUVASTATIN CALCIUM 10 MG/1
5 TABLET, COATED ORAL NIGHTLY
Status: DISCONTINUED | OUTPATIENT
Start: 2022-10-19 | End: 2022-10-21 | Stop reason: HOSPADM

## 2022-10-19 RX ORDER — ACETAMINOPHEN 325 MG/1
650 TABLET ORAL EVERY 4 HOURS PRN
Status: DISCONTINUED | OUTPATIENT
Start: 2022-10-19 | End: 2022-10-21 | Stop reason: HOSPADM

## 2022-10-19 RX ORDER — TOPIRAMATE 100 MG/1
100 TABLET, FILM COATED ORAL 2 TIMES DAILY
Status: DISCONTINUED | OUTPATIENT
Start: 2022-10-19 | End: 2022-10-21 | Stop reason: HOSPADM

## 2022-10-19 RX ORDER — MORPHINE SULFATE 4 MG/ML
4 INJECTION, SOLUTION INTRAMUSCULAR; INTRAVENOUS ONCE
Status: COMPLETED | OUTPATIENT
Start: 2022-10-19 | End: 2022-10-19

## 2022-10-19 RX ORDER — ASPIRIN 81 MG/1
81 TABLET ORAL DAILY
Status: DISCONTINUED | OUTPATIENT
Start: 2022-10-20 | End: 2022-10-21 | Stop reason: HOSPADM

## 2022-10-19 RX ORDER — OXYCODONE HYDROCHLORIDE AND ACETAMINOPHEN 5; 325 MG/1; MG/1
1 TABLET ORAL EVERY 6 HOURS PRN
Status: DISCONTINUED | OUTPATIENT
Start: 2022-10-19 | End: 2022-10-21 | Stop reason: HOSPADM

## 2022-10-19 RX ORDER — SODIUM CHLORIDE 9 MG/ML
INJECTION, SOLUTION INTRAVENOUS PRN
Status: DISCONTINUED | OUTPATIENT
Start: 2022-10-19 | End: 2022-10-21 | Stop reason: HOSPADM

## 2022-10-19 RX ORDER — MORPHINE SULFATE 4 MG/ML
INJECTION, SOLUTION INTRAMUSCULAR; INTRAVENOUS
Status: DISPENSED
Start: 2022-10-19 | End: 2022-10-20

## 2022-10-19 RX ORDER — CLOPIDOGREL BISULFATE 75 MG/1
75 TABLET ORAL DAILY
Status: DISCONTINUED | OUTPATIENT
Start: 2022-10-20 | End: 2022-10-21 | Stop reason: HOSPADM

## 2022-10-19 RX ORDER — METOPROLOL SUCCINATE 25 MG/1
12.5 TABLET, EXTENDED RELEASE ORAL DAILY
Status: DISCONTINUED | OUTPATIENT
Start: 2022-10-20 | End: 2022-10-20

## 2022-10-19 RX ORDER — SODIUM CHLORIDE 0.9 % (FLUSH) 0.9 %
5-40 SYRINGE (ML) INJECTION PRN
Status: DISCONTINUED | OUTPATIENT
Start: 2022-10-19 | End: 2022-10-21 | Stop reason: HOSPADM

## 2022-10-19 RX ORDER — LEVOTHYROXINE SODIUM 0.12 MG/1
125 TABLET ORAL DAILY
Status: DISCONTINUED | OUTPATIENT
Start: 2022-10-20 | End: 2022-10-21 | Stop reason: HOSPADM

## 2022-10-19 RX ORDER — NITROGLYCERIN 20 MG/100ML
5-200 INJECTION INTRAVENOUS CONTINUOUS
Status: DISCONTINUED | OUTPATIENT
Start: 2022-10-19 | End: 2022-10-21 | Stop reason: HOSPADM

## 2022-10-19 RX ADMIN — TOPIRAMATE 100 MG: 100 TABLET, FILM COATED ORAL at 19:58

## 2022-10-19 RX ADMIN — ROSUVASTATIN 5 MG: 10 TABLET, FILM COATED ORAL at 19:59

## 2022-10-19 RX ADMIN — ACETAMINOPHEN 650 MG: 325 TABLET ORAL at 17:40

## 2022-10-19 RX ADMIN — MORPHINE SULFATE 4 MG: 4 INJECTION, SOLUTION INTRAMUSCULAR; INTRAVENOUS at 20:51

## 2022-10-19 RX ADMIN — OXYCODONE HYDROCHLORIDE AND ACETAMINOPHEN 1 TABLET: 5; 325 TABLET ORAL at 21:15

## 2022-10-19 RX ADMIN — IOPAMIDOL 187 ML: 755 INJECTION, SOLUTION INTRAVENOUS at 16:28

## 2022-10-19 RX ADMIN — NITROGLYCERIN 20 MCG/MIN: 20 INJECTION INTRAVENOUS at 17:08

## 2022-10-19 ASSESSMENT — LIFESTYLE VARIABLES
HOW OFTEN DO YOU HAVE A DRINK CONTAINING ALCOHOL: 2-4 TIMES A MONTH
HOW MANY STANDARD DRINKS CONTAINING ALCOHOL DO YOU HAVE ON A TYPICAL DAY: 1 OR 2

## 2022-10-19 ASSESSMENT — PAIN DESCRIPTION - LOCATION
LOCATION: BACK
LOCATION: BACK

## 2022-10-19 ASSESSMENT — PAIN SCALES - GENERAL
PAINLEVEL_OUTOF10: 4
PAINLEVEL_OUTOF10: 8
PAINLEVEL_OUTOF10: 7
PAINLEVEL_OUTOF10: 3
PAINLEVEL_OUTOF10: 10

## 2022-10-19 ASSESSMENT — PAIN DESCRIPTION - DESCRIPTORS: DESCRIPTORS: ACHING

## 2022-10-19 ASSESSMENT — PAIN DESCRIPTION - PAIN TYPE: TYPE: CHRONIC PAIN

## 2022-10-19 NOTE — PRE SEDATION
sedation and analgesia and maximize the potential amnesia. Patient to meet pre-procedure discharge plan.      Medication Planned:  Midazolam intravenously and fentanyl intravenously     Patient is an appropriate candidate for plan of sedation:   Yes    Electronically signed by Albert Real MD on 10/19/2022 at 8:02 AM

## 2022-10-19 NOTE — CARE COORDINATION
[facility] made outreach for Care Management re enrollment. Patient has cardiac procedure scheduled today. She is anxious for procedure and phone call was brief. Patient does not feel that Care Management is necessary at this time. Per patient \" I am doing pretty good\". She was encouraged to reach out to [facility] for any future needs. Patient has agreed and thanked [facility] for call.

## 2022-10-19 NOTE — OP NOTE
[address]   [facility] Operative Note     Procedure Summary  Procedure PCI of Cx and LM   Indication Severe obstructive disease   Consent Obtained   Access RRA   US US guidance used to determine artery patency, size (>2mm), anatomic variations and ideal puncture location. Real-time US utilized concurrent with vascular needle entry into artery. Image(s) permanently recorded and reported in chart. Bleed Risk Low   Sedation Minimal conscious sedation for patient comfort. Independent trained observer pushed medications at my direction. We monitored the patient's level of consciousness and vital signs/physiologic status throughout the procedure duration (see start and stop times above, as well as medication dosages). Start Time 1407   Stop Time 1604   Versed 2.5mg   Fentanyl 150mcg   Contrast 187cc   Flouro 20   EBL <09NY   Complicat None   Specimens None     Findings  Artery Findings/Result   LM Ulcerated, 80% to mid, eccentric   LAD NA   Cx 80% prox   RI N/A   RCA N/A   LVEDP    LVG      Intervention(s)  Artery Location Intervention Result   LM Os-prox Xience 3.5X12 (PD with 4. 0NC to 18atm) No residual   Cx prox Xience 3.5X18  No residual     Post Cath Dx:   CAD as above    Med Recommendations  Recommendation Indicated? Not Given Due To: Detail(s)   DAPT  Yes  Asa, plavix   STATIN - HIGH DOSE Yes     BETA BLOCKER Yes     ACE/ARB/ARNI yes CKD    ALDACTONE No       Non-Med Recommendations  Category Recommendation   EF ASSESSMENT Noninvasive assessment of EF if LVG deferred as IP/OP as appropriate   TOBACCO Avoidance of first and second hand smoke   DIET/EXERCISE Maintain healthy lifestyle with focus on diet, exercise and weight loss   CARDIAC REHAB Referral to outpatient cardiac rehab phase II will be deferred until patient follow-up in office and then determine patient safety and appropriateness to proceed.        STATIN Patient started or continued on max tolerated dose of statin or high intensity statin as appropriate.   If no statin prescribed, secondary to intolerance, allergy or patient refusal.

## 2022-10-19 NOTE — H&P
[address]  H+P  Consult  OP Visit  FU Visit   CC HX HPI   GEN  Doing fair. Sob with exertion    CAD CABG, PCI Ø CP. Continued sob. Need for staged PCI of Cx, delayed to assess maturation of fistula. Afib Watchman    MED  Compliant with CV meds. Ø reported SA. HISTORY/ALLERGY/ROS   MEDHx  has a past medical history of Allergic rhinitis, cause unspecified, Arthritis, Atrial fibrillation (HCC), Bronchopneumonia, CAD (coronary artery disease), Cerebral artery occlusion with cerebral infarction ([facility] [address] Chronic gouty arthropathy, Chronic kidney disease, Congestive heart failure, unspecified, Degeneration of cervical intervertebral disc, Essential and other specified forms of tremor, Gout, HIGH CHOLESTEROL, History of CVA (cerebrovascular accident), Hx of blood clots, Hypertension, Influenza, Leakage of Watchman left atrial appendage closure device, Mitral valve stenosis and aortic valve insufficiency, Movement disorder, Pacemaker, Peptic ulcer, unspecified site, unspecified as acute or chronic, without mention of hemorrhage, perforation, or obstruction, Thyroid disease, Unspecified disorder of kidney and ureter, and Unspecified hypothyroidism. SURGHx  has a past surgical history that includes back surgery; Cholecystectomy; Cardiac surgery; Coronary artery bypass graft (1987); shoulder surgery; Cardiac catheterization (7/2012); hip surgery (Left, 03/16/2017); joint replacement; Cardiac catheterization (08/07/2018); Percutaneous Transluminal Coronary Angio (11/04/2014); pr njx aa&/strd tfrml epi lumbar/sacral 1 level (Right, 12/3/2018); Femoral-femoral Bypass Graft (N/A, 8/22/2019); femoral bypass (Left, 8/22/2019); and IR KYPHOPLASTY LUMBAR 1 VERTEBRAL BODY (5/14/2021). SOCHx  reports that she quit smoking about 35 years ago. Her smoking use included cigarettes. She has a 28.00 pack-year smoking history. She has never used smokeless tobacco. She reports current alcohol use.  She reports that she does not use drugs. FAMx family history includes Cancer in her maternal grandmother, paternal grandmother, and sister; Diabetes in her brother and maternal uncle; Heart Disease in her brother, maternal aunt, and sister; Hypertension in her brother and paternal aunt; Stroke in her maternal aunt. ALLERG Aspirin, Diltiazem, Diltiazem hcl, Lorazepam, Sulfa antibiotics, Atorvastatin, Dabigatran, Mysoline [primidone], Nsaids, Other, and Primidone   ROS Full ROS obtained and negative except as mentioned in HPI   MEDICATIONS   Current Outpatient Medications   Medication Sig Dispense Refill    aspirin EC 81 MG EC tablet Take 1 tablet by mouth daily 90 tablet 1    topiramate (TOPAMAX) 50 MG tablet TAKE 2 TABLETS TWICE DAILY 360 tablet 0    HYDROcodone-acetaminophen (NORCO) 5-325 MG per tablet Take 1 tablet by mouth 4 times daily as needed for Pain for up to 30 days. 120 tablet 0    torsemide (DEMADEX) 20 MG tablet 10 mg on MWF 30 tablet 3    rosuvastatin (CRESTOR) 5 MG tablet Take 1 tablet by mouth nightly 30 tablet 0    albuterol sulfate HFA (VENTOLIN HFA) 108 (90 Base) MCG/ACT inhaler Inhale 2 puffs into the lungs 4 times daily as needed for Wheezing 18 g 0    vitamin D (ERGOCALCIFEROL) 1.25 MG (63649 UT) CAPS capsule TAKE 1 CAPSULE ONCE A WEEK 12 capsule 1    clopidogrel (PLAVIX) 75 MG tablet Take 1 tablet by mouth in the morning. 30 tablet 3    allopurinol (ZYLOPRIM) 100 MG tablet TAKE 1 TABLET EVERY DAY 90 tablet 1    levothyroxine (SYNTHROID) 125 MCG tablet Take 1 tablet by mouth Daily 90 tablet 1    nitroGLYCERIN (NITROLINGUAL) 0.4 MG/SPRAY 0.4 mg spray Place 1 spray under the tongue every 5 minutes as needed for Chest pain 4.9 g 1    fluticasone (FLONASE) 50 MCG/ACT nasal spray 2 sprays by Each Nostril route daily 16 g 0    metoprolol succinate (TOPROL XL) 25 MG extended release tablet Take 0.5 tablets by mouth daily . 5 tablet daily 45 tablet 0     No current facility-administered medications for this encounter. PHYSICAL EXAM   Vitals There were no vitals taken for this visit. Gen Alert, coop, no distress Heart  Rrr, no mrg   Head NC, AT, no abnorm Abd  Soft, NT, +BS, no mass, no OM   Eyes PER, conj/corn clear Ext  Ext nl, AT, no C/C/E   Nose Nares nl, no drain, NT Pulse 2+ and symmetric   Throat Lips, mucosa, tongue nl Skin Col/text/turg nl, no vis rash/les   Neck S/S, TM, NT, no bruit/JVD Psych Nl mood and affect   Lung CTA-B, unlabored, no DTP Lymph   No cervical or axillary LA   Ch wall NT, no deform Neuro  Nl gross M/S exam      ASSESSMENT AND PLAN   *CAD   Date EF Results   Sx 1987  CABGx3   [facility] 2/13  11/14  2/17    8/18  3/21  9/22     60%    N/A  N/A PCI D1  PCI D1 instent stenosis  % prox, S-%, % prox, D1 stent patent, LIMA to LAD  Angioplasty of high-grade proximal RCA lesion proximal Gita Pae)  No intervention Alexus Frances)  No intervention Alexus Frances) ([facility])   MPI 8/22 44% Small-medium sized anterolateral partial reversibility defect of moderate intensity consistent with ischemia and infarction in the territory of the mid and distal LAD and/or LCx . Mild global hypokinesis   TTE 1996 7/12 7/19 7/21 1/22 7/22 >55%  55%  45%  50%    60%      60%   Mod MR,  decreased leaflet motion and \"hockey stick\" appearance of the mitral leaflets. Posterior, watchman in place, no thrombus, PFO present, mod TR  Mod MR,watchman device seated with feliciano-device leak, small an echogenic mass on the surface of the device, consistent with thrombus near the coumadin ridge. Mod TR  Mod MR, , watchman device seated with feliciano-device leak 3mm, mod TR, SPAP 41mmHg,    Plan   Continue aggressive medical treatment at doses above  PCI of Cx IF fistula character unchanged.     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

## 2022-10-20 ENCOUNTER — TELEPHONE (OUTPATIENT)
Dept: CARDIOLOGY CLINIC | Age: 82
End: 2022-10-20

## 2022-10-20 LAB
ANION GAP SERPL CALCULATED.3IONS-SCNC: 10 MMOL/L (ref 3–16)
BUN BLDV-MCNC: 18 MG/DL (ref 7–20)
CALCIUM SERPL-MCNC: 8.4 MG/DL (ref 8.3–10.6)
CHLORIDE BLD-SCNC: 114 MMOL/L (ref 99–110)
CO2: 19 MMOL/L (ref 21–32)
CREAT SERPL-MCNC: 0.9 MG/DL (ref 0.6–1.2)
EKG ATRIAL RATE: 70 BPM
EKG DIAGNOSIS: NORMAL
EKG P AXIS: 53 DEGREES
EKG P-R INTERVAL: 338 MS
EKG Q-T INTERVAL: 414 MS
EKG QRS DURATION: 106 MS
EKG QTC CALCULATION (BAZETT): 447 MS
EKG R AXIS: -12 DEGREES
EKG T AXIS: 110 DEGREES
EKG VENTRICULAR RATE: 70 BPM
GFR SERPL CREATININE-BSD FRML MDRD: >60 ML/MIN/{1.73_M2}
GLUCOSE BLD-MCNC: 85 MG/DL (ref 70–99)
HCT VFR BLD CALC: 39.2 % (ref 36–48)
HEMOGLOBIN: 12.1 G/DL (ref 12–16)
MCH RBC QN AUTO: 25.9 PG (ref 26–34)
MCHC RBC AUTO-ENTMCNC: 30.8 G/DL (ref 31–36)
MCV RBC AUTO: 84 FL (ref 80–100)
PDW BLD-RTO: 19.3 % (ref 12.4–15.4)
PLATELET # BLD: 321 K/UL (ref 135–450)
PMV BLD AUTO: 9 FL (ref 5–10.5)
POTASSIUM SERPL-SCNC: 3.6 MMOL/L (ref 3.5–5.1)
RBC # BLD: 4.66 M/UL (ref 4–5.2)
SODIUM BLD-SCNC: 143 MMOL/L (ref 136–145)
WBC # BLD: 9.2 K/UL (ref 4–11)

## 2022-10-20 PROCEDURE — 6360000002 HC RX W HCPCS: Performed by: NURSE PRACTITIONER

## 2022-10-20 PROCEDURE — 94760 N-INVAS EAR/PLS OXIMETRY 1: CPT

## 2022-10-20 PROCEDURE — 2580000003 HC RX 258: Performed by: INTERNAL MEDICINE

## 2022-10-20 PROCEDURE — 80048 BASIC METABOLIC PNL TOTAL CA: CPT

## 2022-10-20 PROCEDURE — G0378 HOSPITAL OBSERVATION PER HR: HCPCS

## 2022-10-20 PROCEDURE — 99233 SBSQ HOSP IP/OBS HIGH 50: CPT | Performed by: NURSE PRACTITIONER

## 2022-10-20 PROCEDURE — 6370000000 HC RX 637 (ALT 250 FOR IP): Performed by: INTERNAL MEDICINE

## 2022-10-20 PROCEDURE — 96365 THER/PROPH/DIAG IV INF INIT: CPT

## 2022-10-20 PROCEDURE — 6370000000 HC RX 637 (ALT 250 FOR IP): Performed by: NURSE PRACTITIONER

## 2022-10-20 PROCEDURE — 85027 COMPLETE CBC AUTOMATED: CPT

## 2022-10-20 PROCEDURE — 96366 THER/PROPH/DIAG IV INF ADDON: CPT

## 2022-10-20 PROCEDURE — 93010 ELECTROCARDIOGRAM REPORT: CPT | Performed by: INTERNAL MEDICINE

## 2022-10-20 PROCEDURE — 93005 ELECTROCARDIOGRAM TRACING: CPT | Performed by: NURSE PRACTITIONER

## 2022-10-20 PROCEDURE — 96375 TX/PRO/DX INJ NEW DRUG ADDON: CPT

## 2022-10-20 RX ORDER — FUROSEMIDE 10 MG/ML
40 INJECTION INTRAMUSCULAR; INTRAVENOUS ONCE
Status: COMPLETED | OUTPATIENT
Start: 2022-10-20 | End: 2022-10-20

## 2022-10-20 RX ORDER — CLONIDINE HYDROCHLORIDE 0.1 MG/1
0.1 TABLET ORAL EVERY 4 HOURS PRN
Status: DISCONTINUED | OUTPATIENT
Start: 2022-10-20 | End: 2022-10-21 | Stop reason: HOSPADM

## 2022-10-20 RX ORDER — LOSARTAN POTASSIUM 25 MG/1
25 TABLET ORAL 2 TIMES DAILY
Status: DISCONTINUED | OUTPATIENT
Start: 2022-10-20 | End: 2022-10-21 | Stop reason: HOSPADM

## 2022-10-20 RX ORDER — CARVEDILOL 25 MG/1
25 TABLET ORAL 2 TIMES DAILY WITH MEALS
Status: DISCONTINUED | OUTPATIENT
Start: 2022-10-20 | End: 2022-10-21 | Stop reason: HOSPADM

## 2022-10-20 RX ORDER — HYDRALAZINE HYDROCHLORIDE 20 MG/ML
10 INJECTION INTRAMUSCULAR; INTRAVENOUS EVERY 6 HOURS PRN
Status: DISCONTINUED | OUTPATIENT
Start: 2022-10-20 | End: 2022-10-21 | Stop reason: HOSPADM

## 2022-10-20 RX ADMIN — CARVEDILOL 25 MG: 25 TABLET, FILM COATED ORAL at 17:30

## 2022-10-20 RX ADMIN — LOSARTAN POTASSIUM 25 MG: 25 TABLET, FILM COATED ORAL at 17:53

## 2022-10-20 RX ADMIN — LEVOTHYROXINE SODIUM 125 MCG: 0.12 TABLET ORAL at 08:46

## 2022-10-20 RX ADMIN — SODIUM CHLORIDE, PRESERVATIVE FREE 10 ML: 5 INJECTION INTRAVENOUS at 08:46

## 2022-10-20 RX ADMIN — TOPIRAMATE 100 MG: 100 TABLET, FILM COATED ORAL at 08:46

## 2022-10-20 RX ADMIN — ASPIRIN 81 MG: 81 TABLET, COATED ORAL at 08:46

## 2022-10-20 RX ADMIN — SODIUM CHLORIDE, PRESERVATIVE FREE 10 ML: 5 INJECTION INTRAVENOUS at 20:40

## 2022-10-20 RX ADMIN — CLOPIDOGREL BISULFATE 75 MG: 75 TABLET ORAL at 08:46

## 2022-10-20 RX ADMIN — METOPROLOL SUCCINATE 12.5 MG: 25 TABLET, EXTENDED RELEASE ORAL at 08:46

## 2022-10-20 RX ADMIN — HYDRALAZINE HYDROCHLORIDE 10 MG: 20 INJECTION INTRAMUSCULAR; INTRAVENOUS at 16:50

## 2022-10-20 RX ADMIN — FUROSEMIDE 40 MG: 10 INJECTION, SOLUTION INTRAMUSCULAR; INTRAVENOUS at 14:23

## 2022-10-20 RX ADMIN — ROSUVASTATIN 5 MG: 10 TABLET, FILM COATED ORAL at 20:40

## 2022-10-20 RX ADMIN — TOPIRAMATE 100 MG: 100 TABLET, FILM COATED ORAL at 20:40

## 2022-10-20 ASSESSMENT — PAIN SCALES - GENERAL
PAINLEVEL_OUTOF10: 2
PAINLEVEL_OUTOF10: 2
PAINLEVEL_OUTOF10: 0
PAINLEVEL_OUTOF10: 0

## 2022-10-20 NOTE — PROGRESS NOTES
Bp 200/100 nitro gtt restarted. Call to cardiololgy. Bp discussed with Dr. Bethany Pedro, see new orders. Will continue to monitor.

## 2022-10-20 NOTE — PROGRESS NOTES
[facility] SUMMARY      Patient ID:  Herve Juan  [id number] 66 y.o. 1940    Admit date: 10/19/2022    Discharge date:  10/21/22    Admitting Physician: Gaytari Ceron MD     Discharge NP: Daisy Martinez APRN - CNP    Admission Diagnoses: Atherosclerotic heart disease of native coronary artery without angina pectoris [I25.10]  Coronary atherosclerosis due to lipid rich plaque [I25.83]  Atherosclerosis of coronary artery bypass graft(s) without angina pectoris [I25.810]  Unstable angina ([facility] [I20.0]    Discharge Diagnoses:   Patient Active Problem List   Diagnosis    Mixed hyperlipidemia    Chronic gouty arthropathy    Acquired hypothyroidism    Non-rheumatic mitral regurgitation    COPD (chronic obstructive pulmonary disease) ([facility])    Restrictive lung disease    Sick sinus syndrome ([facility]    Allergic rhinitis    Fibrocystic breast    Cerebrovascular accident (CVA) ([facility]    HTN (hypertension), benign    Pacemaker    PAT (paroxysmal atrial tachycardia) ([facility])    Primary osteoarthritis involving multiple joints    Vitamin D deficiency    Tremor    Chronic total occlusion of native coronary artery    Age related osteoporosis    Chronic systolic heart failure ([facility])    Stage 3 chronic kidney disease ([facility])    PAD (peripheral artery disease) ([facility]    Atherosclerosis of native arteries of extremities with intermittent claudication, bilateral legs ([facility])    Idiopathic peripheral neuropathy    Ischemic cardiomyopathy    Peripheral vertigo, bilateral    PAF (paroxysmal atrial fibrillation) ([facility])    Dyslipidemia    Iron deficiency anemia    Anemia    Bilateral sensorineural hearing loss    Memory loss due to medical condition    Symptomatic bradycardia    Pacemaker lead failure    Presence of Watchman left atrial appendage closure device    Chronic right sacroiliac pain    Postcholecystectomy diarrhea    CKD (chronic kidney disease) stage 4, GFR 15-29 ml/min ([facility])    Unstable angina [facility])    Coronary artery disease involving native heart without angina pectoris, unspecified vessel or lesion type         Discharged Condition: good    Hospital Course: Mauro Campos is a 80 y.o. female with Need for staged PCI of Cx, delayed to assess maturation of fistula. Ø CP. Continued sob.  (per H&P)    Coronary artery disease   ~ hx of CABG x3 in '87 and known  of RCA  ~ abnormal stress test   ~ [facility] 10/19/22: s/p PCI of LM and Cx with MARLENY  ~ continue DAPT with aspirin and plavix, coreg, and statin     CKD  ~ kidney function stable  ~ recheck at OP follow up visit    Chronic diastolic CHF  ~ EF 79-42%   ~ received x1 dose of IV lasix yesterday with improvement with breathing and orthopnea. Resolution of crackles. Resume torsemide 10mg MWF at dc. Chronic atrial fibrillation   ~ s/p Watchman 5/2021    Wide complex tachycardia   ~ slow VT vs AT, asymptomatic   ~ device interrogation unremarkable. Reviewed with EP. Plan for [facility] at [facility]. Hypertension   ~ much better controlled today  ~ Toprol changed to coreg and diuresed     PPM 1/2021  Hx of TIA  HLD- currently on maximally tolerated statin with LDL well controlled   PAD s/p bypass     At discharge, pt reports she feels much better today. Denies any chest pain. Breathing much improved after diuresis yesterday. Weakness improved. BP better. Denies orthopnea. Crackles resolved. Tele stable today. Pt feels very eager to discharge home. Cath site c/d/I. Medications and discharge instructions reviewed. All questions and concerns addressed    Consults: IP CONSULT TO CARDIAC REHAB  IP CONSULT TO CARDIAC REHAB    The patient was seen and examined. Notes, labs, and recent testing reviewed. There were not complications over night. The patient is being seen for coronary artery disease.     Objective:     BP (!) 123/47   Pulse 70   Temp 98 °F (36.7 °C) (Temporal)   Resp 15   Ht 5' 3\" (1.6 m)   Wt 115 lb 8.3 oz (52.4 kg)   SpO2 94%   BMI 20.46 kg/m²      Intake/Output Summary (Last 24 hours) at 10/21/2022 1009  Last data filed at 10/21/2022 1004  Gross per 24 hour   Intake 623.32 ml   Output 975 ml   Net -351.68 ml       Physical Exam:  General:  Awake, alert, NAD  Skin:  Warm and dry. Procedure site c/d/i  Neck:  JVD<8, no bruit  Chest:  Clear to auscultation, no wheezes/rhonchi/rales  Cardiovascular:  RRR S1S2  Abdomen:  Soft, nontender, +bowel sounds  Extremities:  no edema      Significant Diagnostic Studies:     ECG: 10/19/22  Electronic atrial pacemaker  Septal infarct (cited on or before 10-SEP-2022)  ST & T wave abnormality, consider lateral ischemia  When compared with ECG of 19-OCT-2022 11:55,  No significant change was found    DONA: 7/2022   Summary   Normal left ventricle size, wall thickness, and systolic function with an   estimated ejection fraction of 55-60%. Moderate mitral regurgitation is present. Left atrial size is dilated. There is a Watchman device seated with a feliciano-device leak measured at 3 mm   on the anterior side. No thrombus seen. Moderate tricuspid regurgitation. Systolic pulmonary artery pressure (SPAP) estimated at 41 mmHg (RA pressure   3 mmHg), consistent with mild pulmonary hypertension    LHC: 10/19/22  Findings  Artery Findings/Result   LM Ulcerated, 80% to mid, eccentric   LAD NA   Cx 80% prox   RI N/A   RCA N/A   LVEDP     LVG        Intervention(s)  Artery Location Intervention Result   LM Os-prox Xience 3.5X12 (PD with 4. 0NC to 18atm) No residual   Cx prox Xience 3.5X18  No residual      Post Cath Dx:       CAD as above    Labs:   Lab Results   Component Value Date    CREATININE 1.2 10/21/2022    BUN 19 10/21/2022     10/21/2022    K 3.5 10/21/2022     (H) 10/21/2022    CO2 19 (L) 10/21/2022      Lab Results   Component Value Date    WBC 9.2 10/20/2022    HGB 12.1 10/20/2022    HCT 39.2 10/20/2022    MCV 84.0 10/20/2022     10/20/2022      Lab Results   Component Value Date    INR 1.19 (H) 01/22/2022    PROTIME 13.5 (H) 01/22/2022      Lab Results   Component Value Date     (A) 06/08/2022     CARDIAC ENZYMES:No results for input(s): CKMB, CKMBINDEX, TROPONINI in the last 72 hours. Invalid input(s): CKTOTAL;3  FASTING LIPID PANEL:  Lab Results   Component Value Date/Time    CHOL 135 01/23/2022 04:22 AM    HDL 27 01/23/2022 04:22 AM    HDL 31 06/06/2012 08:49 AM    TRIG 227 01/23/2022 04:22 AM       Disposition: home    Patient Instructions:        Medication List        START taking these medications      carvedilol 25 MG tablet  Commonly known as: COREG  Take 1 tablet by mouth 2 times daily (with meals)            CONTINUE taking these medications      albuterol sulfate  (90 Base) MCG/ACT inhaler  Commonly known as: Ventolin HFA  Inhale 2 puffs into the lungs 4 times daily as needed for Wheezing     allopurinol 100 MG tablet  Commonly known as: ZYLOPRIM  TAKE 1 TABLET EVERY DAY     aspirin EC 81 MG EC tablet  Take 1 tablet by mouth daily     clopidogrel 75 MG tablet  Commonly known as: Plavix  Take 1 tablet by mouth in the morning. fluticasone 50 MCG/ACT nasal spray  Commonly known as: FLONASE  2 sprays by Each Nostril route daily     HYDROcodone-acetaminophen 5-325 MG per tablet  Commonly known as: NORCO  Take 1 tablet by mouth 4 times daily as needed for Pain for up to 30 days.      levothyroxine 125 MCG tablet  Commonly known as: SYNTHROID  Take 1 tablet by mouth Daily     nitroGLYCERIN 0.4 MG/SPRAY 0.4 mg spray  Commonly known as: NITROLINGUAL  Place 1 spray under the tongue every 5 minutes as needed for Chest pain     rosuvastatin 5 MG tablet  Commonly known as: CRESTOR  Take 1 tablet by mouth nightly     topiramate 50 MG tablet  Commonly known as: TOPAMAX  TAKE 2 TABLETS TWICE DAILY     torsemide 20 MG tablet  Commonly known as: DEMADEX  10 mg on MWF     vitamin D 1.25 MG (54777 UT) Caps capsule  Commonly known as: ERGOCALCIFEROL  TAKE 1 CAPSULE ONCE A WEEK STOP taking these medications      metoprolol succinate 25 MG extended release tablet  Commonly known as: TOPROL XL               Where to Get Your Medications        These medications were sent to [facility] [id number], [address]      Phone: [phone]   carvedilol 25 MG tablet       The patient is on a beta-blocker  The patient is not on an ace-i/ARB  The patient is on a statin  The patient is on antiplatelet therapy  The patient does have history of AF, and is not on anticoagulation; s/p Watchman     1. Overall the patient is stable from CV standpoint  2. Most recent cardiac testing has been reviewed as above. Further testing is not required at this time. 3. The patient is not currently smoking. The risks related to smoking were reviewed with the patient. Recommend maintaining a smoke-free lifestyle. Products available for smoking cessation were discussed in detail. Activity: no lifting, Driving, or Strenuous exercise for  wk  Diet: cardiac diet  Wound Care: keep wound clean and dry    Follow-up with [facility] [id number] in 10/25 with [address] [place] [address]. Recommend follow up with PCP in 3-4 weeks. Signed:  TIFFANIE Villa CNP, 10/21/2022, 10:09 AM  Time spent on discharge of patient: >31 minutes, including plan of care, patient education, and care coordination. NOTE:  This report was transcribed using voice recognition software. Every effort was made to ensure accuracy; however, inadvertent computerized transcription errors may be present.

## 2022-10-20 NOTE — TELEPHONE ENCOUNTER
Patient's daughter stopped by the office today and would like for Dr. Kota Verde to call her regarding Maria Teresa Regan. Maria Teresa Regan is not breathing well and her blood pressure was 213/71. Please call.    [email or web address]

## 2022-10-20 NOTE — PROGRESS NOTES
ACT drawn and resulted 159. Patient instructed on removal procedure. Patient premedicated with 4mg Morphine. Arterial sheath site without hematoma or oozing. Arterial sheath removed per policy without difficulty. Integrity of sheath inspected upon removal and no abnormalities noted. Manual pressure held X 20 minutes. Dry, sterile tegaderm applied. Pressure dressing applied. Patient tolerated well. Vital signs, groin checks, and pedal pulses will be completed per protocol (every 15 minutes X 4, every 30 minutes X 2, and every hour X 2 per protocol). Sheath pulled @ 2139    Sheath removed by  Sailaja Koenig RN.

## 2022-10-20 NOTE — PROGRESS NOTES
[facility]   Cardiology Progress Note     Date: 10/20/2022  Admit Date: 10/19/2022       Chief Complaint: No chief complaint on file. History of Present Illness: History obtained from patient and medical record. Rebekah Bowie is a 80 y.o. female with a past medical history of  (per consult note)    Interval Hx: Today, she is feeling short of breath. Has orthopnea and crackles in bases. Tele with wide complex tachycardia, asymptomatic. No chest pain. Cath procedure site c/d/I. No hematoma or bruising. Good pulses, color, warmth, and sensation to that extremity. Patient seen and examined. Clinical notes reviewed. Telemetry reviewed. No new complaints today. No major events overnight. Denies having chest pain, palpitations, shortness of breath, orthopnea/PND, cough, or dizziness at the time of this visit.       Past Medical History:  Past Medical History:   Diagnosis Date    Allergic rhinitis, cause unspecified     Arthritis     Atrial fibrillation (HCC)     Bronchopneumonia     CAD (coronary artery disease)     stent:  post cataract surgery (CABG)    Cerebral artery occlusion with cerebral infarction [facility])     TIA    Chronic gouty arthropathy     Chronic kidney disease     Congestive heart failure, unspecified     Degeneration of cervical intervertebral disc     Essential and other specified forms of tremor     Gout     HIGH CHOLESTEROL     History of CVA (cerebrovascular accident)     Hx of blood clots     Hypertension     Influenza 12/23/2017    Leakage of Watchman left atrial appendage closure device     Mitral valve stenosis and aortic valve insufficiency     Movement disorder     back problems    Pacemaker     Peptic ulcer, unspecified site, unspecified as acute or chronic, without mention of hemorrhage, perforation, or obstruction     Thyroid disease     Unspecified disorder of kidney and ureter     Unspecified hypothyroidism         Past Surgical History:    has a past surgical history that includes back surgery; Cholecystectomy; Cardiac surgery; Coronary artery bypass graft (1987); shoulder surgery; Cardiac catheterization (7/2012); hip surgery (Left, 03/16/2017); joint replacement; Cardiac catheterization (08/07/2018); Percutaneous Transluminal Coronary Angio (11/04/2014); pr njx aa&/strd tfrml epi lumbar/sacral 1 level (Right, 12/3/2018); Femoral-femoral Bypass Graft (N/A, 8/22/2019); femoral bypass (Left, 8/22/2019); and IR KYPHOPLASTY LUMBAR 1 VERTEBRAL BODY (5/14/2021). Social History:  Reviewed. reports that she quit smoking about 35 years ago. Her smoking use included cigarettes. She has a 28.00 pack-year smoking history. She has never used smokeless tobacco. She reports current alcohol use. She reports that she does not use drugs. Allergies: Allergies   Allergen Reactions    Aspirin Nausea Only    Diltiazem Anaphylaxis    Diltiazem Hcl Anaphylaxis    Lorazepam Other (See Comments)     hallucinations  hallucinations  hallucinations    Sulfa Antibiotics Rash and Hives    Atorvastatin Other (See Comments)     Muscle pains  Muscle pains  Muscle pains    Dabigatran Nausea Only     And indigestion  And indigestion    Aka Pradaxa  And indigestion  And indigestion  And indigestion    Aka Pradaxa  And indigestion  And indigestion  And indigestion    Aka Pradaxa    Mysoline [Primidone]     Nsaids      Other reaction(s): Unknown    Other Other (See Comments)     Nitroglycerin patches causes severe headaches    Primidone      Other reaction(s): Unknown       Family History:  Reviewed. family history includes Cancer in her maternal grandmother, paternal grandmother, and sister; Diabetes in her brother and maternal uncle; Heart Disease in her brother, maternal aunt, and sister; Hypertension in her brother and paternal aunt; Stroke in her maternal aunt. Denies family history of sudden cardiac death, arrhythmia, premature CAD    Home Meds:  Prior to Visit Medications    Medication Sig Taking? Authorizing Provider   aspirin EC 81 MG EC tablet Take 1 tablet by mouth daily  Ester Valdez MD   topiramate (TOPAMAX) 50 MG tablet TAKE 2 TABLETS TWICE DAILY  Sid Coles MD   HYDROcodone-acetaminophen (NORCO) 5-325 MG per tablet Take 1 tablet by mouth 4 times daily as needed for Pain for up to 30 days. [address] [facility], APRN - NP   torsemide (DEMADEX) 20 MG tablet 10 mg on MWF  Tory Mcgovern APRN - CNS   rosuvastatin (CRESTOR) 5 MG tablet Take 1 tablet by mouth nightly  Trevor Christie MD   albuterol sulfate HFA (VENTOLIN HFA) 108 (90 Base) MCG/ACT inhaler Inhale 2 puffs into the lungs 4 times daily as needed for Wheezing  Sid Coles MD   vitamin D (ERGOCALCIFEROL) 1.25 MG (77228 UT) CAPS capsule TAKE 1 CAPSULE ONCE A WEEK  iSd Coles MD   clopidogrel (PLAVIX) 75 MG tablet Take 1 tablet by mouth in the morning. Mikhail Gray MD   allopurinol (ZYLOPRIM) 100 MG tablet TAKE 1 TABLET EVERY DAY  Sid Coles MD   levothyroxine (SYNTHROID) 125 MCG tablet Take 1 tablet by mouth Daily  Sid Coles MD   nitroGLYCERIN (NITROLINGUAL) 0.4 MG/SPRAY 0.4 mg spray Place 1 spray under the tongue every 5 minutes as needed for Chest pain  Sid Coles MD   fluticasone (FLONASE) 50 MCG/ACT nasal spray 2 sprays by Each Nostril route daily  TIFFANIE Pagan - CNP   metoprolol succinate (TOPROL XL) 25 MG extended release tablet Take 0.5 tablets by mouth daily . 5 tablet daily  TIFFANIE Biswas CNP        Scheduled Meds:   sodium chloride flush  5-40 mL IntraVENous 2 times per day    aspirin EC  81 mg Oral Daily    clopidogrel  75 mg Oral Daily    levothyroxine  125 mcg Oral Daily    metoprolol succinate  12.5 mg Oral Daily    rosuvastatin  5 mg Oral Nightly    topiramate  100 mg Oral BID     Continuous Infusions:   sodium chloride      nitroGLYCERIN Stopped (10/20/22 0241)     PRN Meds:sodium chloride flush, sodium chloride, acetaminophen, ondansetron, oxyCODONE-acetaminophen     Review of Systems:  Constitutional: Negative for fever, night sweats, chills,  Skin: Negative for rash, pruritus, bleeding, or bruising    HEENT: Negative for vision changes, ringing in the ears, dysphagia  Respiratory: Reviewed in HPI  Cardiovascular: Reviewed in HPI  Gastrointestinal: Negative for abdominal pain, N/V/D, constipation, or black/tarry stools  Genito-Urinary: Negative for dysuria, incontinence, or hematuria  Musculoskeletal: Negative for joint swelling, muscle pain, or injuries  Neurological/Psych: Negative for confusion, seizures, headaches, or TIA-like symptoms. No anxiety or depression. Physical Examination:  Vitals:    10/20/22 1500   BP: (!) 180/77   Pulse: 72   Resp:    Temp:    SpO2:       In: 536.7 [P.O.:490; I.V.:46.7]  Out: 950    Wt Readings from Last 3 Encounters:   10/20/22 117 lb 1 oz (53.1 kg)   10/17/22 116 lb 12.8 oz (53 kg)   10/03/22 118 lb (53.5 kg)       Intake/Output Summary (Last 24 hours) at 10/20/2022 1533  Last data filed at 10/20/2022 1500  Gross per 24 hour   Intake 536.68 ml   Output 950 ml   Net -413.32 ml       Telemetry: Personally Reviewed  - Sinus rhythm, wide complex tachycardia    Constitutional: Cooperative and in no apparent distress, and appears frail  Skin: Warm and pink; no pallor, cyanosis, clubbing, or bruising   HEENT: Symmetric and normocephalic. PERRL. Conjunctiva pink with clear sclera. Mucus membranes moist.   Cardiovascular: Regular rate and rhythm. S1/S2 present without murmurs, no rubs or gallops. Peripheral pulses 2+, capillary refill < 3 seconds. No elevation of JVP. No peripheral edema  Respiratory: Respirations symmetric and unlabored. Lungs with crackles in bases   Gastrointestinal: Abdomen soft and round. Bowel sounds active without tenderness. Musculoskeletal: Bilateral upper and lower extremity strength with generalized weakness   Neurologic/Psych: Awake and orientated to person, place and time.  Calm affect, appropriate mood    Pertinent labs, diagnostic, device, and imaging results reviewed as a part of this visit    Labs:    BMP:   Recent Labs     10/19/22  1143 10/20/22  0526    143   K 3.6 3.6   * 114*   CO2 20* 19*   BUN 22* 18   CREATININE 1.1 0.9     Estimated Creatinine Clearance: 40 mL/min (based on SCr of 0.9 mg/dL). CBC:   Recent Labs     10/19/22  1143 10/20/22  0526   WBC 9.2 9.2   HGB 13.3 12.1   HCT 42.8 39.2   MCV 83.5 84.0    321     Thyroid:   Lab Results   Component Value Date/Time    TSH 0.40 09/11/2022 02:12 AM    K1AJQCE 0.89 08/16/2022 12:15 PM    I3LVUXT 7.3 10/03/2018 02:41 PM     Lipids:   Lab Results   Component Value Date/Time    CHOL 135 01/23/2022 04:22 AM    HDL 27 01/23/2022 04:22 AM    HDL 31 06/06/2012 08:49 AM    TRIG 227 01/23/2022 04:22 AM     LFTS:   Lab Results   Component Value Date/Time    ALT 9 09/11/2022 02:12 AM    AST 19 09/11/2022 02:12 AM    ALKPHOS 110 09/11/2022 02:12 AM    PROT 6.9 09/11/2022 02:12 AM    PROT 7.1 10/19/2012 01:06 PM    AGRATIO 1.4 09/11/2022 02:12 AM    BILITOT 0.6 09/11/2022 02:12 AM    BILITOT 0.4 07/17/2019 10:46 AM     Cardiac Enzymes:   Lab Results   Component Value Date/Time    CKTOTAL 28 12/23/2017 11:04 AM    TROPONINI <0.01 09/14/2022 07:11 PM    TROPONINI <0.01 09/13/2022 04:50 PM    TROPONINI <0.01 09/10/2022 09:07 PM     Coags:   Lab Results   Component Value Date/Time    PROTIME 13.5 01/22/2022 02:16 PM    INR 1.19 01/22/2022 02:16 PM       ECG: 10/19/22  Electronic atrial pacemaker  Septal infarct (cited on or before 10-SEP-2022)  ST & T wave abnormality, consider lateral ischemia  When compared with ECG of 19-OCT-2022 11:55,  No significant change was found     DONA: 7/2022   Summary   Normal left ventricle size, wall thickness, and systolic function with an   estimated ejection fraction of 55-60%. Moderate mitral regurgitation is present. Left atrial size is dilated.    There is a Watchman device seated with a feliciano-device leak measured at 3 mm   on the anterior side. No thrombus seen. Moderate tricuspid regurgitation. Systolic pulmonary artery pressure (SPAP) estimated at 41 mmHg (RA pressure   3 mmHg), consistent with mild pulmonary hypertension     [facility]: 10/19/22  Findings  Artery Findings/Result   LM Ulcerated, 80% to mid, eccentric   LAD NA   Cx 80% prox   RI N/A   RCA N/A   LVEDP     LVG        Intervention(s)  Artery Location Intervention Result   LM Os-prox Xience 3.5X12 (PD with 4. 0NC to 18atm) No residual   Cx prox Xience 3.5X18  No residual      Post Cath Dx:       CAD as above    Problem List:   Patient Active Problem List    Diagnosis Date Noted    Unstable angina ([address] 10/19/2022    CKD (chronic kidney disease) stage 4, GFR 15-29 ml/min ([address] 06/28/2022    Chronic right sacroiliac pain 05/09/2022    Postcholecystectomy diarrhea 05/09/2022    Presence of Watchman left atrial appendage closure device 05/17/2021    Symptomatic bradycardia     Pacemaker lead failure     Memory loss due to medical condition 09/09/2020    Bilateral sensorineural hearing loss 07/15/2020    Iron deficiency anemia 04/30/2020    Anemia 04/30/2020    Peripheral vertigo, bilateral     PAF (paroxysmal atrial fibrillation) ([address]     Dyslipidemia     Ischemic cardiomyopathy 12/04/2019    Idiopathic peripheral neuropathy 11/22/2019    Atherosclerosis of native arteries of extremities with intermittent claudication, bilateral legs (HCC)     Stage 3 chronic kidney disease ([address] 07/22/2019    PAD (peripheral artery disease) ([address] 07/22/2019    Chronic systolic heart failure ([address] 12/28/2018    Age related osteoporosis 05/01/2018    Chronic total occlusion of native coronary artery 04/12/2018    Tremor 11/08/2017    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/10/2017    Vitamin D deficiency 08/10/2017    PAT (paroxysmal atrial tachycardia) ([address] 08/08/2017    Pacemaker 04/24/2017    Cerebrovascular accident (CVA) ([address]     HTN (hypertension), benign     Fibrocystic breast 03/03/2017    Allergic rhinitis 05/26/2015    Sick sinus syndrome ([facility] 11/19/2013    COPD (chronic obstructive pulmonary disease) ([facility] 02/14/2013    Restrictive lung disease 02/14/2013    Non-rheumatic mitral regurgitation 07/20/2012    Chronic gouty arthropathy     Acquired hypothyroidism     Mixed hyperlipidemia 05/16/2011        Assessment and Plan:     Coronary artery disease   ~ hx of CABG x3 in '87 and known  of RCA  ~ abnormal stress test   ~ [facility] 10/19/22: s/p PCI of LM and Cx with MARLENY  ~ continue DAPT with aspirin and plavix, Toprol, and statin      CKD  ~ creatinine stable today     Chronic diastolic CHF  ~ appears volume overloaded. +crackles, orthopnea, SOB  ~ give x1 dose of IV lasix. Recheck BMP in AM.     Chronic atrial fibrillation   ~ s/p Watchman 5/2021    Wide complex tachycardia   ~ slow VT vs AT, asymptomatic   ~ interrogation unremarkable. Reviewed with EP. Plan for MCOT at dc. PPM 1/2021  Hx of TIA  HTN- elevated, diuresis   HLD  PAD s/p bypass     Multiple medical conditions with risk of decompensation. All pertinent information and plan of care discussed with the physician. All questions and concerns were addressed to the patient. Alternatives to my treatment were discussed. I have discussed the above stated plan with patient and the nurse. The patient verbalized understanding and agreed with the plan. Thank you for allowing to us to participate in the care of Mohsen Hendrix. Total visit time > 35 minutes; > 50% spend counseling / coordinating care. I reviewed interval history, physical exam, review of data including labs, imaging, development and implementation of treatment plan and coordination of complex care. Counseled on risk factor modifications. Giorgio MORENO-CNP  [facility]   Office: [phone]    NOTE:  This report was transcribed using voice recognition software.   Every effort was made to ensure accuracy; however, inadvertent computerized transcription errors may be present.

## 2022-10-20 NOTE — CARE COORDINATION
Patient admitted as Observation with an anticipated short hospitalization length of stay. Chart reviewed and it appears that patient has minimal needs for discharge at this time. Discussed with patients nurse and requested that case management be notified if discharge needs are identified. Patient is active with PCP and cardiologist with recent visits for both. *Case management will continue to follow progress and update discharge plan as needed.     Electronically signed by BRIANNA Rice, ERIK on 10/20/2022 at 12:57 PM

## 2022-10-21 VITALS
WEIGHT: 115.52 LBS | DIASTOLIC BLOOD PRESSURE: 47 MMHG | BODY MASS INDEX: 20.47 KG/M2 | RESPIRATION RATE: 15 BRPM | OXYGEN SATURATION: 94 % | HEIGHT: 63 IN | HEART RATE: 70 BPM | TEMPERATURE: 98 F | SYSTOLIC BLOOD PRESSURE: 123 MMHG

## 2022-10-21 DIAGNOSIS — R00.0 WIDE-COMPLEX TACHYCARDIA: Primary | ICD-10-CM

## 2022-10-21 PROBLEM — I25.10 CORONARY ARTERY DISEASE INVOLVING NATIVE HEART WITHOUT ANGINA PECTORIS, UNSPECIFIED VESSEL OR LESION TYPE: Status: ACTIVE | Noted: 2022-10-21

## 2022-10-21 LAB
ANION GAP SERPL CALCULATED.3IONS-SCNC: 12 MMOL/L (ref 3–16)
BUN BLDV-MCNC: 19 MG/DL (ref 7–20)
CALCIUM SERPL-MCNC: 9 MG/DL (ref 8.3–10.6)
CHLORIDE BLD-SCNC: 112 MMOL/L (ref 99–110)
CO2: 19 MMOL/L (ref 21–32)
CREAT SERPL-MCNC: 1.2 MG/DL (ref 0.6–1.2)
EKG ATRIAL RATE: 70 BPM
EKG DIAGNOSIS: NORMAL
EKG P-R INTERVAL: 378 MS
EKG Q-T INTERVAL: 432 MS
EKG QRS DURATION: 104 MS
EKG QTC CALCULATION (BAZETT): 466 MS
EKG R AXIS: -11 DEGREES
EKG T AXIS: 78 DEGREES
EKG VENTRICULAR RATE: 70 BPM
GFR SERPL CREATININE-BSD FRML MDRD: 45 ML/MIN/{1.73_M2}
GLUCOSE BLD-MCNC: 101 MG/DL (ref 70–99)
POTASSIUM SERPL-SCNC: 3.5 MMOL/L (ref 3.5–5.1)
PRO-BNP: ABNORMAL PG/ML (ref 0–449)
SODIUM BLD-SCNC: 143 MMOL/L (ref 136–145)

## 2022-10-21 PROCEDURE — 2000000000 HC ICU R&B

## 2022-10-21 PROCEDURE — 83880 ASSAY OF NATRIURETIC PEPTIDE: CPT

## 2022-10-21 PROCEDURE — 94760 N-INVAS EAR/PLS OXIMETRY 1: CPT

## 2022-10-21 PROCEDURE — 6370000000 HC RX 637 (ALT 250 FOR IP): Performed by: NURSE PRACTITIONER

## 2022-10-21 PROCEDURE — 93010 ELECTROCARDIOGRAM REPORT: CPT | Performed by: INTERNAL MEDICINE

## 2022-10-21 PROCEDURE — 6370000000 HC RX 637 (ALT 250 FOR IP): Performed by: INTERNAL MEDICINE

## 2022-10-21 PROCEDURE — 80048 BASIC METABOLIC PNL TOTAL CA: CPT

## 2022-10-21 PROCEDURE — 99239 HOSP IP/OBS DSCHRG MGMT >30: CPT | Performed by: NURSE PRACTITIONER

## 2022-10-21 RX ORDER — TORSEMIDE 20 MG/1
10 TABLET ORAL ONCE
Status: COMPLETED | OUTPATIENT
Start: 2022-10-21 | End: 2022-10-21

## 2022-10-21 RX ORDER — POTASSIUM CHLORIDE 20 MEQ/1
40 TABLET, EXTENDED RELEASE ORAL ONCE
Status: COMPLETED | OUTPATIENT
Start: 2022-10-21 | End: 2022-10-21

## 2022-10-21 RX ORDER — FUROSEMIDE 10 MG/ML
40 INJECTION INTRAMUSCULAR; INTRAVENOUS ONCE
Status: CANCELLED | OUTPATIENT
Start: 2022-10-21 | End: 2022-10-21

## 2022-10-21 RX ORDER — CARVEDILOL 25 MG/1
25 TABLET ORAL 2 TIMES DAILY WITH MEALS
Qty: 60 TABLET | Refills: 3 | Status: SHIPPED | OUTPATIENT
Start: 2022-10-21 | End: 2022-11-07 | Stop reason: SDUPTHER

## 2022-10-21 RX ADMIN — TOPIRAMATE 100 MG: 100 TABLET, FILM COATED ORAL at 08:09

## 2022-10-21 RX ADMIN — CLOPIDOGREL BISULFATE 75 MG: 75 TABLET ORAL at 08:09

## 2022-10-21 RX ADMIN — CARVEDILOL 25 MG: 25 TABLET, FILM COATED ORAL at 08:09

## 2022-10-21 RX ADMIN — ASPIRIN 81 MG: 81 TABLET, COATED ORAL at 08:09

## 2022-10-21 RX ADMIN — LEVOTHYROXINE SODIUM 125 MCG: 0.12 TABLET ORAL at 06:33

## 2022-10-21 RX ADMIN — POTASSIUM CHLORIDE 40 MEQ: 1500 TABLET, EXTENDED RELEASE ORAL at 10:01

## 2022-10-21 RX ADMIN — TORSEMIDE 10 MG: 20 TABLET ORAL at 11:39

## 2022-10-21 NOTE — PROGRESS NOTES
Cardiology RN to pt room with ordered [facility] monitor. Monitor reviewed with pt and/or family. All questions answered. Cardiology RN placed monitor on pt. Pt and/or family verbalize understanding.         Katelynn Lira RN

## 2022-10-21 NOTE — DISCHARGE SUMMARY
[facility] SUMMARY        Patient ID:  Tamiko Hogue  [id number] 4 y.o. 1940     Admit date: 10/19/2022     Discharge date:  10/21/22     Admitting Physician: Payal Fontana MD      Discharge NP: TIFFANIE Starr - CNP     Admission Diagnoses: Atherosclerotic heart disease of native coronary artery without angina pectoris [I25.10]  Coronary atherosclerosis due to lipid rich plaque [I25.83]  Atherosclerosis of coronary artery bypass graft(s) without angina pectoris [I25.810]  Unstable angina ([facility] [I20.0]     Discharge Diagnoses:       Patient Active Problem List   Diagnosis    Mixed hyperlipidemia    Chronic gouty arthropathy    Acquired hypothyroidism    Non-rheumatic mitral regurgitation    COPD (chronic obstructive pulmonary disease) ([facility])    Restrictive lung disease    Sick sinus syndrome ([facility]    Allergic rhinitis    Fibrocystic breast    Cerebrovascular accident (CVA) ([facility]    HTN (hypertension), benign    Pacemaker    PAT (paroxysmal atrial tachycardia) ([facility])    Primary osteoarthritis involving multiple joints    Vitamin D deficiency    Tremor    Chronic total occlusion of native coronary artery    Age related osteoporosis    Chronic systolic heart failure ([facility])    Stage 3 chronic kidney disease ([facility])    PAD (peripheral artery disease) ([facility]    Atherosclerosis of native arteries of extremities with intermittent claudication, bilateral legs ([facility])    Idiopathic peripheral neuropathy    Ischemic cardiomyopathy    Peripheral vertigo, bilateral    PAF (paroxysmal atrial fibrillation) ([facility])    Dyslipidemia    Iron deficiency anemia    Anemia    Bilateral sensorineural hearing loss    Memory loss due to medical condition    Symptomatic bradycardia    Pacemaker lead failure    Presence of Watchman left atrial appendage closure device    Chronic right sacroiliac pain    Postcholecystectomy diarrhea    CKD (chronic kidney disease) stage 4, GFR 15-29 ml/min ([facility]) kg)   SpO2 94%   BMI 20.46 kg/m²       Intake/Output Summary (Last 24 hours) at 10/21/2022 1009  Last data filed at 10/21/2022 1004      Gross per 24 hour   Intake 623.32 ml   Output 975 ml   Net -351.68 ml         Physical Exam:  General:  Awake, alert, NAD  Skin:  Warm and dry. Procedure site c/d/i  Neck:  JVD<8, no bruit  Chest:  Clear to auscultation, no wheezes/rhonchi/rales  Cardiovascular:  RRR S1S2  Abdomen:  Soft, nontender, +bowel sounds  Extremities:  no edema        Significant Diagnostic Studies:      ECG: 10/19/22  Electronic atrial pacemaker  Septal infarct (cited on or before 10-SEP-2022)  ST & T wave abnormality, consider lateral ischemia  When compared with ECG of 19-OCT-2022 11:55,  No significant change was found     DONA: 7/2022   Summary   Normal left ventricle size, wall thickness, and systolic function with an   estimated ejection fraction of 55-60%. Moderate mitral regurgitation is present. Left atrial size is dilated. There is a Watchman device seated with a feliciano-device leak measured at 3 mm   on the anterior side. No thrombus seen. Moderate tricuspid regurgitation. Systolic pulmonary artery pressure (SPAP) estimated at 41 mmHg (RA pressure   3 mmHg), consistent with mild pulmonary hypertension     [facility]: 10/19/22  Findings  Artery Findings/Result   LM Ulcerated, 80% to mid, eccentric   LAD NA   Cx 80% prox   RI N/A   RCA N/A   LVEDP     LVG        Intervention(s)  Artery Location Intervention Result   LM Os-prox Xience 3.5X12 (PD with 4. 0NC to 18atm) No residual   Cx prox Xience 3.5X18  No residual      Post Cath Dx:       CAD as above     Labs:         Lab Results   Component Value Date     CREATININE 1.2 10/21/2022     BUN 19 10/21/2022      10/21/2022     K 3.5 10/21/2022      (H) 10/21/2022     CO2 19 (L) 10/21/2022            Lab Results   Component Value Date     WBC 9.2 10/20/2022     HGB 12.1 10/20/2022     HCT 39.2 10/20/2022     MCV 84.0 10/20/2022     PLT 321 10/20/2022            Lab Results   Component Value Date     INR 1.19 (H) 01/22/2022     PROTIME 13.5 (H) 01/22/2022            Lab Results   Component Value Date      (A) 06/08/2022      CARDIAC ENZYMES:No results for input(s): CKMB, CKMBINDEX, TROPONINI in the last 72 hours. Invalid input(s): CKTOTAL;3  FASTING LIPID PANEL:        Lab Results   Component Value Date/Time     CHOL 135 01/23/2022 04:22 AM     HDL 27 01/23/2022 04:22 AM     HDL 31 06/06/2012 08:49 AM     TRIG 227 01/23/2022 04:22 AM         Disposition: home     Patient Instructions:          Medication List          START taking these medications       carvedilol 25 MG tablet  Commonly known as: COREG  Take 1 tablet by mouth 2 times daily (with meals)                CONTINUE taking these medications       albuterol sulfate  (90 Base) MCG/ACT inhaler  Commonly known as: Ventolin HFA  Inhale 2 puffs into the lungs 4 times daily as needed for Wheezing      allopurinol 100 MG tablet  Commonly known as: ZYLOPRIM  TAKE 1 TABLET EVERY DAY      aspirin EC 81 MG EC tablet  Take 1 tablet by mouth daily      clopidogrel 75 MG tablet  Commonly known as: Plavix  Take 1 tablet by mouth in the morning. fluticasone 50 MCG/ACT nasal spray  Commonly known as: FLONASE  2 sprays by Each Nostril route daily      HYDROcodone-acetaminophen 5-325 MG per tablet  Commonly known as: NORCO  Take 1 tablet by mouth 4 times daily as needed for Pain for up to 30 days.       levothyroxine 125 MCG tablet  Commonly known as: SYNTHROID  Take 1 tablet by mouth Daily      nitroGLYCERIN 0.4 MG/SPRAY 0.4 mg spray  Commonly known as: NITROLINGUAL  Place 1 spray under the tongue every 5 minutes as needed for Chest pain      rosuvastatin 5 MG tablet  Commonly known as: CRESTOR  Take 1 tablet by mouth nightly      topiramate 50 MG tablet  Commonly known as: TOPAMAX  TAKE 2 TABLETS TWICE DAILY      torsemide 20 MG tablet  Commonly known as: DEMADEX  10 mg on MWF vitamin D 1.25 MG (56655 UT) Caps capsule  Commonly known as: ERGOCALCIFEROL  TAKE 1 CAPSULE ONCE A WEEK                STOP taking these medications       metoprolol succinate 25 MG extended release tablet  Commonly known as: TOPROL XL                    Where to Get Your Medications          These medications were sent to [facility] [id number], [facility], [address]        Phone: [phone]   carvedilol 25 MG tablet         The patient is on a beta-blocker  The patient is not on an ace-i/ARB  The patient is on a statin  The patient is on antiplatelet therapy  The patient does have history of AF, and is not on anticoagulation; s/p Watchman      1. Overall the patient is stable from CV standpoint  2. Most recent cardiac testing has been reviewed as above. Further testing is not required at this time. 3. The patient is not currently smoking. The risks related to smoking were reviewed with the patient. Recommend maintaining a smoke-free lifestyle. Products available for smoking cessation were discussed in detail. Activity: no lifting, Driving, or Strenuous exercise for  wk  Diet: cardiac diet  Wound Care: keep wound clean and dry     Follow-up with [facility] in 10/25 with [address] [place] [address]. Recommend follow up with PCP in 3-4 weeks. Signed:  TIFFANIE Farias CNP, 10/21/2022, 10:09 AM  Time spent on discharge of patient: >31 minutes, including plan of care, patient education, and care coordination. NOTE:  This report was transcribed using voice recognition software. Every effort was made to ensure accuracy; however, inadvertent computerized transcription errors may be present.

## 2022-10-21 NOTE — CARE COORDINATION
Patient discharged 10/21/22  to home   All discharge needs met per case management     Hosea Nicole RN, BSN  [phone]

## 2022-10-21 NOTE — PROGRESS NOTES
No significant events to note overnight. Nitro gtt weaned off and BP stable without the use of any PRN's. Remains A-paced. O2 weaned off, denies any SCHMID or orthopnea, lung sounds clear with diminished bases bilaterally. Intermittent confusion throughout shift, easy to reorient. Ambulating to BR with contact assist and voiding adequate urine. Declined offered bath/shower throughout shift. L groin site unremarkable. Will cont to monitor.

## 2022-10-21 NOTE — PLAN OF CARE
Problem: Chronic Conditions and Co-morbidities  Goal: Patient's chronic conditions and co-morbidity symptoms are monitored and maintained or improved  10/20/2022 2222 by Maki Cohen RN  Outcome: Progressing  Flowsheets (Taken 10/20/2022 2000)  Care Plan - Patient's Chronic Conditions and Co-Morbidity Symptoms are Monitored and Maintained or Improved:   Monitor and assess patient's chronic conditions and comorbid symptoms for stability, deterioration, or improvement   Collaborate with multidisciplinary team to address chronic and comorbid conditions and prevent exacerbation or deterioration   Update acute care plan with appropriate goals if chronic or comorbid symptoms are exacerbated and prevent overall improvement and discharge  10/20/2022 0909 by Sylvia Recinos RN  Outcome: Progressing  Flowsheets (Taken 10/20/2022 0800)  Care Plan - Patient's Chronic Conditions and Co-Morbidity Symptoms are Monitored and Maintained or Improved: Monitor and assess patient's chronic conditions and comorbid symptoms for stability, deterioration, or improvement     Problem: Discharge Planning  Goal: Discharge to home or other facility with appropriate resources  10/20/2022 2222 by Maki Cohen RN  Outcome: Progressing  Flowsheets (Taken 10/20/2022 2000)  Discharge to home or other facility with appropriate resources:   Identify barriers to discharge with patient and caregiver   Arrange for needed discharge resources and transportation as appropriate   Refer to discharge planning if patient needs post-hospital services based on physician order or complex needs related to functional status, cognitive ability or social support system  10/20/2022 0909 by Sylvia Recinos RN  Outcome: Progressing  Flowsheets (Taken 10/20/2022 0800)  Discharge to home or other facility with appropriate resources: Identify barriers to discharge with patient and caregiver     Problem: Safety - Adult  Goal: Free from fall injury  10/20/2022 2222 by Hanny Harris RN  Outcome: Progressing  10/20/2022 4406 by Minh Adames RN  Outcome: Progressing  Flowsheets (Taken 10/20/2022 9557)  Free From Fall Injury:   Based on caregiver fall risk screen, instruct family/caregiver to ask for assistance with transferring infant if caregiver noted to have fall risk factors   Instruct family/caregiver on patient safety     Problem: Pain  Goal: Verbalizes/displays adequate comfort level or baseline comfort level  10/20/2022 2222 by Hanny Harris RN  Outcome: Progressing  10/20/2022 0909 by Minh Adames RN  Outcome: Progressing  Flowsheets  Taken 10/20/2022 0902 by Minh Adames RN  Verbalizes/displays adequate comfort level or baseline comfort level: Encourage patient to monitor pain and request assistance  Taken 10/20/2022 0000 by Estefania Robbins RN  Verbalizes/displays adequate comfort level or baseline comfort level:   Assess pain using appropriate pain scale   Encourage patient to monitor pain and request assistance   Administer analgesics based on type and severity of pain and evaluate response

## 2022-10-21 NOTE — PROGRESS NOTES
Discharge:    IV and telemetry discontinued. Discharge information, medications, and follow-up instructions reviewed with pt. Time was allowed for discussion and questions, and pt verbalized understanding of instructions. F/u appts scheduled. Pt left unit via wheelchair and is being discharged to private residence.     Electronically signed by Chele Penaloza RN on 10/21/2022 at 12:29 PM

## 2022-10-24 LAB
POC ACT LR: 159 SEC
POC ACT LR: 195 SEC
POC ACT LR: 236 SEC

## 2022-10-24 RX ORDER — ROSUVASTATIN CALCIUM 5 MG/1
5 TABLET, COATED ORAL NIGHTLY
Qty: 90 TABLET | Refills: 1 | Status: SHIPPED | OUTPATIENT
Start: 2022-10-24 | End: 2022-11-07 | Stop reason: SDUPTHER

## 2022-10-24 NOTE — TELEPHONE ENCOUNTER
Medication:   Requested Prescriptions     Pending Prescriptions Disp Refills    rosuvastatin (CRESTOR) 5 MG tablet 30 tablet 0     Sig: Take 1 tablet by mouth nightly      Last Filled:      Patient Phone Number: [phone] (home) [phone] (work)    Last appt: 10/3/2022   Next appt: 10/28/2022    Last OARRS:   RX Monitoring 6/1/2021   Attestation -   Periodic Controlled Substance Monitoring Possible medication side effects, risk of tolerance/dependence & alternative treatments discussed.      PDMP Monitoring:    Last PDMP Fay Coyne as Reviewed [facility]):  Review User Review Instant Review Result   Carmela Dunbar 4/21/2022  4:20 PM Reviewed PDMP [1]     Preferred Pharmacy:   [facility] [id number] - [id number] [phone] [place] [phone]  [address] [place] [address]  Phone: [phone] Fax: [phone]

## 2022-10-24 NOTE — PATIENT INSTRUCTIONS
Patient needs 3 month in office device check scheduled    1. No changes  2. Schedule for DONA in January  3.  Call office if any issues

## 2022-10-25 ENCOUNTER — OFFICE VISIT (OUTPATIENT)
Dept: CARDIOLOGY CLINIC | Age: 82
End: 2022-10-25
Payer: MEDICARE

## 2022-10-25 VITALS
WEIGHT: 117 LBS | HEART RATE: 70 BPM | BODY MASS INDEX: 20.73 KG/M2 | HEIGHT: 63 IN | OXYGEN SATURATION: 99 % | SYSTOLIC BLOOD PRESSURE: 128 MMHG | DIASTOLIC BLOOD PRESSURE: 60 MMHG

## 2022-10-25 DIAGNOSIS — N28.9 RENAL INSUFFICIENCY: ICD-10-CM

## 2022-10-25 DIAGNOSIS — I25.83 CORONARY ARTERY DISEASE DUE TO LIPID RICH PLAQUE: ICD-10-CM

## 2022-10-25 DIAGNOSIS — I48.20 CHRONIC ATRIAL FIBRILLATION (HCC): ICD-10-CM

## 2022-10-25 DIAGNOSIS — I25.10 CORONARY ARTERY DISEASE DUE TO LIPID RICH PLAQUE: ICD-10-CM

## 2022-10-25 DIAGNOSIS — I50.22 CHRONIC SYSTOLIC HEART FAILURE (HCC): Primary | ICD-10-CM

## 2022-10-25 PROCEDURE — 1090F PRES/ABSN URINE INCON ASSESS: CPT | Performed by: CLINICAL NURSE SPECIALIST

## 2022-10-25 PROCEDURE — G8484 FLU IMMUNIZE NO ADMIN: HCPCS | Performed by: CLINICAL NURSE SPECIALIST

## 2022-10-25 PROCEDURE — 1124F ACP DISCUSS-NO DSCNMKR DOCD: CPT | Performed by: CLINICAL NURSE SPECIALIST

## 2022-10-25 PROCEDURE — G8420 CALC BMI NORM PARAMETERS: HCPCS | Performed by: CLINICAL NURSE SPECIALIST

## 2022-10-25 PROCEDURE — 99214 OFFICE O/P EST MOD 30 MIN: CPT | Performed by: CLINICAL NURSE SPECIALIST

## 2022-10-25 PROCEDURE — G8427 DOCREV CUR MEDS BY ELIG CLIN: HCPCS | Performed by: CLINICAL NURSE SPECIALIST

## 2022-10-25 PROCEDURE — 3078F DIAST BP <80 MM HG: CPT | Performed by: CLINICAL NURSE SPECIALIST

## 2022-10-25 PROCEDURE — 3074F SYST BP LT 130 MM HG: CPT | Performed by: CLINICAL NURSE SPECIALIST

## 2022-10-25 PROCEDURE — 1036F TOBACCO NON-USER: CPT | Performed by: CLINICAL NURSE SPECIALIST

## 2022-10-25 PROCEDURE — G8399 PT W/DXA RESULTS DOCUMENT: HCPCS | Performed by: CLINICAL NURSE SPECIALIST

## 2022-10-25 PROCEDURE — 1111F DSCHRG MED/CURRENT MED MERGE: CPT | Performed by: CLINICAL NURSE SPECIALIST

## 2022-10-25 NOTE — PATIENT INSTRUCTIONS
Take 20 mg of torsemide daily for the next 2 days and then 10 mg every other day  Continue all other medications  Continue to monitor BP and call me if >140  Blood work in 10 days  Let me know if not feeling well  RTO in 1 month

## 2022-10-25 NOTE — TELEPHONE ENCOUNTER
Pt is very SOB. . Daughter says pt saw NPRG and was put on a temporary dose of Torsemide that was just started today. She stated that they will see how she does with the med.  No need to send a message to DCE

## 2022-10-25 NOTE — PROGRESS NOTES
[facility]  Progress Note    Primary Care Doctor:  Chai Apple MD    Chief Complaint   Patient presents with    Follow-up    Congestive Heart Failure    Shortness of Breath        History of Present Illness:  80 y.o. female with history of Ms. Robert Barrett She has a history of atrial fibrillation (on xarelto), TIA, chronic kidney disease, CHF, hypertension, hyperlipidemia, mitral valve disease, hypothyroidism. Her last LVEF was 45% on 7/19/19. 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 Plan is for a PCI in about 30 days    I had the pleasure of seeing Obi Skaggs in follow up for hospitalization 10/19-21/2022 for C with PCI to LM and cx, she was given 40 mg IV lasix for bnp of 14K (baseline is 3-4000) and BP extremely elevated, started on coreg. She had complex tachycardia vs AT and has a MCOT monitor. She still has some shortness of breath and some anxiety at night when she sleeps. Her BP at home is better on the coreg but was 160/ after going to the grocery store. No chest pain or palpitations.       Past Medical History:   has a past medical history of Allergic rhinitis, cause unspecified, Arthritis, Atrial fibrillation ([address] Bronchopneumonia, CAD (coronary artery disease), Cerebral artery occlusion with cerebral infarction ([address] Chronic gouty arthropathy, Chronic kidney disease, Congestive heart failure, unspecified, Degeneration of cervical intervertebral disc, Essential and other specified forms of tremor, Gout, HIGH CHOLESTEROL, History of CVA (cerebrovascular accident), Hx of blood clots, Hypertension, Influenza, Leakage of Watchman left atrial appendage closure device, Mitral valve stenosis and aortic valve insufficiency, Movement disorder, Pacemaker, Peptic ulcer, unspecified site, unspecified as acute or chronic, without mention of hemorrhage, perforation, or obstruction, Thyroid disease, Unspecified disorder of kidney and ureter, and Unspecified hypothyroidism. Surgical History:   has a past surgical history that includes back surgery; Cholecystectomy; Cardiac surgery; Coronary artery bypass graft (1987); shoulder surgery; Cardiac catheterization (7/2012); hip surgery (Left, 03/16/2017); joint replacement; Cardiac catheterization (08/07/2018); Percutaneous Transluminal Coronary Angio (11/04/2014); pr njx aa&/strd tfrml epi lumbar/sacral 1 level (Right, 12/3/2018); Femoral-femoral Bypass Graft (N/A, 8/22/2019); femoral bypass (Left, 8/22/2019); and IR KYPHOPLASTY LUMBAR 1 VERTEBRAL BODY (5/14/2021). Social History:   reports that she quit smoking about 35 years ago. Her smoking use included cigarettes. She has a 28.00 pack-year smoking history. She has never used smokeless tobacco. She reports current alcohol use. She reports that she does not use drugs. Family History:   Family History   Problem Relation Age of Onset    Cancer Sister     Heart Disease Sister     Diabetes Brother     Hypertension Brother     Heart Disease Brother     Stroke Maternal Aunt     Heart Disease Maternal Aunt     Diabetes Maternal Uncle     Hypertension Paternal Aunt     Cancer Maternal Grandmother     Cancer Paternal Grandmother     Rheum Arthritis Neg Hx     Lupus Neg Hx        Home Medications:  Prior to Admission medications    Medication Sig Start Date End Date Taking?  Authorizing Provider   rosuvastatin (CRESTOR) 5 MG tablet Take 1 tablet by mouth nightly 10/24/22  Yes Chai Apple MD   carvedilol (COREG) 25 MG tablet Take 1 tablet by mouth 2 times daily (with meals) 10/21/22  Yes Jameson Montalvo APRN - CNP   aspirin EC 81 MG EC tablet Take 1 tablet by mouth daily 10/17/22  Yes Gilda Carbone MD HYDROcodone-acetaminophen (NORCO) 5-325 MG per tablet Take 1 tablet by mouth 4 times daily as needed for Pain for up to 30 days. 9/26/22 10/26/22 Yes TIFFANIE Darling NP   torsemide (DEMADEX) 20 MG tablet 10 mg on MWF 9/21/22  Yes TIFFANIE Duque - CNS   albuterol sulfate HFA (VENTOLIN HFA) 108 (90 Base) MCG/ACT inhaler Inhale 2 puffs into the lungs 4 times daily as needed for Wheezing 8/17/22  Yes Kesha Isabel MD   vitamin D (ERGOCALCIFEROL) 1.25 MG (90373 UT) CAPS capsule TAKE 1 CAPSULE ONCE A WEEK 8/3/22  Yes Kesha Isabel MD   clopidogrel (PLAVIX) 75 MG tablet Take 1 tablet by mouth in the morning. 7/20/22  Yes Jesse Scanlon MD   allopurinol (ZYLOPRIM) 100 MG tablet TAKE 1 TABLET EVERY DAY 7/13/22  Yes Kesha Isabel MD   levothyroxine (SYNTHROID) 125 MCG tablet Take 1 tablet by mouth Daily 6/28/22  Yes Kesha Isabel MD   nitroGLYCERIN (NITROLINGUAL) 0.4 MG/SPRAY 0.4 mg spray Place 1 spray under the tongue every 5 minutes as needed for Chest pain 2/8/22  Yes Kesha Isabel MD   fluticasone [facility]) 50 MCG/ACT nasal spray 2 sprays by Each Nostril route daily 1/25/22  Yes TIFFANIE Perera - CNP   topiramate (TOPAMAX) 50 MG tablet TAKE 2 TABLETS TWICE DAILY 10/10/22   Kesha Isabel MD        Allergies:  Aspirin, Diltiazem, Diltiazem hcl, Lorazepam, Sulfa antibiotics, Atorvastatin, Dabigatran, Mysoline [primidone], Nsaids, Other, and Primidone     Review of Systems:   Constitutional: there has been no unanticipated weight loss. There's been no change in energy level, sleep pattern, or activity level. Eyes: No visual changes or diplopia. No scleral icterus. ENT: No Headaches, hearing loss or vertigo. No mouth sores or sore throat. Cardiovascular: Reviewed in HPI  Respiratory: No cough or wheezing, no sputum production. No hematemesis. Gastrointestinal: No abdominal pain, appetite loss, blood in stools. No change in bowel or bladder habits. States she feels bloated   Genitourinary: No dysuria, trouble voiding, or hematuria. Musculoskeletal:  No gait disturbance, weakness or joint complaints. Integumentary: No rash or pruritis. Neurological: No headache, diplopia, change in muscle strength, numbness or tingling. No change in gait, balance, coordination, mood, affect, memory, mentation, behavior. Psychiatric: No anxiety, no depression. Endocrine: No malaise, fatigue or temperature intolerance. No excessive thirst, fluid intake, or urination. No tremor. Hematologic/Lymphatic: No abnormal bruising or bleeding, blood clots or swollen lymph nodes. Allergic/Immunologic: No nasal congestion or hives. Physical Examination:    Vitals:    10/25/22 1331   BP: 128/60   Site: Right Upper Arm   Position: Sitting   Cuff Size: Medium Adult   Pulse: 70   SpO2: 99%   Weight: 117 lb (53.1 kg)   Height: 5' 3\" (1.6 m)        Constitutional and General Appearance: Warm and dry, no apparent distress, normal coloration  HEENT:  Normocephalic, atraumatic  Respiratory:  Normal excursion and expansion without use of accessory muscles  Resp Auscultation: Normal breath sounds without dullness  Cardiovascular: The apical impulses not displaced  Heart tones are crisp and normal  Normal JVP  Regular rhythm   Peripheral pulses are symmetrical and full  There is no clubbing, cyanosis of the extremities.   No ankle edema  Pedal Pulses: 2+ and equal   Abdomen:  No masses or tenderness  Liver/Spleen: No Abnormalities Noted  Neurological/Psychiatric:  Alert and oriented in all spheres  Moves all extremities well  Exhibits normal gait balance and coordination  No abnormalities of mood, affect, memory, mentation, or behavior are noted    Lab Data:    CBC:   Lab Results   Component Value Date/Time    WBC 9.2 10/20/2022 05:26 AM    WBC 9.2 10/19/2022 11:43 AM    WBC 8.4 09/15/2022 05:47 AM    RBC 4.66 10/20/2022 05:26 AM    RBC 5.13 10/19/2022 11:43 AM    RBC 4.93 09/15/2022 05:47 AM HGB 12.1 10/20/2022 05:26 AM    HGB 13.3 10/19/2022 11:43 AM    HGB 13.2 09/15/2022 05:47 AM    HCT 39.2 10/20/2022 05:26 AM    HCT 42.8 10/19/2022 11:43 AM    HCT 43.0 09/15/2022 05:47 AM    MCV 84.0 10/20/2022 05:26 AM    MCV 83.5 10/19/2022 11:43 AM    MCV 87.1 09/15/2022 05:47 AM    RDW 19.3 10/20/2022 05:26 AM    RDW 19.5 10/19/2022 11:43 AM    RDW 20.4 09/15/2022 05:47 AM     10/20/2022 05:26 AM     10/19/2022 11:43 AM     09/15/2022 05:47 AM     BMP:  Lab Results   Component Value Date/Time     10/21/2022 04:41 AM     10/20/2022 05:26 AM     10/19/2022 11:43 AM    K 3.5 10/21/2022 04:41 AM    K 3.6 10/20/2022 05:26 AM    K 3.6 10/19/2022 11:43 AM    K 4.0 09/16/2022 05:35 AM    K 4.0 09/15/2022 05:47 AM    K 4.1 09/13/2022 04:58 AM     10/21/2022 04:41 AM     10/20/2022 05:26 AM     10/19/2022 11:43 AM    CO2 19 10/21/2022 04:41 AM    CO2 19 10/20/2022 05:26 AM    CO2 20 10/19/2022 11:43 AM    PHOS 3.2 06/08/2022 02:37 PM    PHOS 4.4 03/17/2022 10:52 AM    PHOS 4.1 12/20/2021 12:46 PM    BUN 19 10/21/2022 04:41 AM    BUN 18 10/20/2022 05:26 AM    BUN 22 10/19/2022 11:43 AM    CREATININE 1.2 10/21/2022 04:41 AM    CREATININE 0.9 10/20/2022 05:26 AM    CREATININE 1.1 10/19/2022 11:43 AM     BNP:   Lab Results   Component Value Date/Time    PROBNP 14,854 10/21/2022 04:41 AM    PROBNP 14,427 09/12/2022 05:17 AM    PROBNP 7,004 09/10/2022 04:34 PM     Cardiac Imaging:  [facility]: 10/19/22 Dr Chucho Casas  Findings  Artery Findings/Result   LM Ulcerated, 80% to mid, eccentric   LAD NA   Cx 80% prox   RI N/A   RCA N/A   LVEDP     LVG        Intervention(s)  Artery Location Intervention Result   LM Os-prox Xience 3.5X12 (PD with 4. 0NC to 18atm) No residual   Cx prox Xience 3.5X18  No residual      Post Cath Dx:       CAD as above      Cardiac CTA: 9/15/22  FINDINGS:   Left atrium:       Watch man device is seen.   There is only a small amount of thrombus in the   watch man device. .       The left atrial appendage is not thrombosed. Contrast is seen in the left   atrial appendage, compatible with leakage. .       On axial images 18. There is a crescentic area of contrast flowing around   the watch man device superiorly in the left atrial appendage. Boris Medin However, no   definite fistulous tract is seen connecting this to the left atrium. Circumflex coronary artery abuts the inferior aspect of the watch man device. There is severe calcified and noncalcified plaque seen in the circumflex. A   definite fistulous tract between the circumflex and the left atrial appendage   is not clearly identified by CT. Boris Medin Native right coronary artery and left anterior descending coronary artery   heavily calcified. Mediastinum: Heart is enlarged. Thyroid gland unremarkable. Streak artifact   is seen from pacer. Ascending aorta measures 3.5 cm. No intimal flap is seen. No pericardial   effusion. Small hiatal hernia seen. No central pulmonary embolus   identified. Small mediastinal and hilar nodes are noted. Right hilar [place]   conglomerate, measures 1.9 cm x 2.4 cm. .  Calcification or clip seen in the   region of mitral valve. Lungs/Pleura: Respiratory motion artifact limits evaluation of fine pulmonary   parenchymal change. Mild underlying emphysema is seen. De pendant opacity   is seen at the lung bases, likely atelectasis. Pleural calcifications seen   on the left. Right hilar nodes are seen, similar compared to conventional chest CT August 2020       Upper Abdomen: Low attenuation is seen liver, compatible with fatty   infiltration. Soft Tissues/Bones: No acute bone or soft tissue abnormality.        Cath: 9/12/22 Dr Sarbjit Cárdenas  Findings  Artery Findings/Result   LM 30% ostial to proximal   LAD Patent prox to mid stent, 60% diffuse ostial to mid   Cx 80% prox, fistula noted from Cx proper v Cx branch to left atrium/left atrial appendage (not present on prior angiogram done before placement of Watchman device, suspect erosion), OM1 70% bifurcational, inferior branch of OM1 50% mid,    RI N/A   RCA Mid 100% with R-R collaterals. L-LAD patent   LVEDP NA   LVG NA      RHC:  RA RV PA FANNIE WP TCO TCI JODI FPC RA% PA%   2 30/3 35/15 23 JULIOCESAR 4.5 3.0 4.7 3.1 70 67%      Intervention(s)  None     Post Cath Dx:       CAD as above  Consider PCI of Cx in future if Cx proper geographically  from [address] device on CTC    DONA Dr Alyssa Melendez 7/20/2022  Summary   Normal left ventricle size, wall thickness, and systolic function with an   estimated ejection fraction of 55-60%. Moderate mitral regurgitation is present. Left atrial size is dilated. There is a Watchman device seated with a feliciano-device leak measured at 3 mm   on the anterior side. No thrombus seen. Moderate tricuspid regurgitation. Systolic pulmonary artery pressure (SPAP) estimated at 41 mmHg (RA pressure   3 mmHg), consistent with mild pulmonary hypertension. Echo 1/12/2022  Summary   Normal left ventricle size, wall thickness, and systolic function with an   estimated ejection fraction of 55-60%. Mitral valve leaflets appear moderately thickened. The posterior leaflet   appears severely restricted. Moderate mitral regurgitation is present. Mitral annular calcification. Left atrial size is dilated. There is a Watchman device seated with a   feliciano-device leak measured at 2 mm on the anterior side. There is a small an echogenic mass on the surface of the device, consistent   with thrombus near the coumadin ridge. DONA 7/7/2021  Summary   Ejection fraction is visually estimated to be 45-50 %. Mild thickening of anterior, posterior leaflet of mitral valve. There is decreased leaflet motion and \"hockey stick\" appearance of the   mitral leaflets. posterior leaflet is immobile. Mean Gradient 4 mmHg. Moderate mitral regurgitation is present.    There is no evidence of mass or thrombus in the left atrium or appendage. Watchman in place. No thrombus. <3 mm leak. The left atrium is dilated. A PFO is present. Aortic valve leaflets appear thickened. Tricuspid aortic valve. Mild aortic regurgitation is present. Plaque is noted in the thoracic aorta. Moderate tricuspid regurgitation. PASP 51 mmHg    [facility] 3/19/2021 Dr Charmaine Allen  Artery Findings/Result   LM Normal   LAD 50% ostial, 50% mid instent   Cx 50% mid at OM1 bifurcation, 30% OM1 mid at bifurcation   RI NA   % prox with multiple R-R collaterals   LVEDP 8   LVG NA due to renal failure      Intervention:         None     Post Cath Dx:       Stable CAD  Possible angina from  of RCA - poor candidate for  intervention with renal failure    Echo 4/30/2020   Left ventricular cavity size is normal. Normal left ventricular wall   thickness. Left ventricular function appears to be reduced with an estimated   ejection fraction of 45%. Diffuse hypokinesis. Echo 7/19/2019   Summary    Left ventricle - normal size, thickness, reduced function with EF of 45%  LV wall motion - diffuse hypokinesis. Mitral valve - thickened, annular calcification, moderate regurgitation  Aortic valve - thickened, calcified, mild regurgitation  Tricuspid valve - mild regurgitation with PASP of 25mmHg  Pulmonic valve - trivial regurgitation   Biatrial enlargement  Pacemaker / ICD lead is visualized in the right heart.     6/2019 Dr Charmaine Allen  NSTEMI: . Angiogram findings were as below:      Findings:                      LM       Normal              LAD     50% ostial, mid 100%              Cx        40% OM1 at bifurcation              RCA     Mid 100%              LVG     Not performed              EDP     15 mmHg              L-LAD  Patent              S-PDA Known occluded  Severe Ca++:None  Post Cath Dx:Stable CAD, no apparent culprit for NSTEMI  Intervention:  None  Med Rec:        Continue aggressive med tx                          Continue plavix, no asa due to allergy. statin added. LDL 75   8/7/18  The PCI of complex proximal RCA chronic total occlusion was unsuccessful (J- score 3). Underwent successful Angioplasty of high-grade proximal RCA lesion proximal to the . RECOMMENDATIONS:  Attempt on this complex long  was unsuccessful. Lack   Of retrograde collaterals along with a very ambiguous cap as well as a large RV marginal branch and bridging collateral coming off at the cap makes it a  challenging repeat attempt. If she continues to have angina, it is recommended to proceed with the single-vessel SVG to the RCA. Echo: 2/17/16 ([facility])  Conclusions: Normal LV size and systolic function Mild to moderate mitral  regurgitation Mild tricuspid regurgitation  Findings:   Left Atrium: Mild to moderate enlargement of the left atrium. Left Ventricle: Upper limits of normal left ventricle size. No left ventricular  hypertrophy. Normal left ventricular systolic function. The ejection fraction   Is visually estimated to be 55 %. Right Atrium: Normal right atrial size. RightVentricle: Normal right ventricle size. Normal right ventricular function. Aortic Valve: Tri-leaflet aortic valve. Mild aortic cusp calcification. No  aortic valve stenosis. Mild (1+) aortic valve regurgitation. Aorta: No dilatation of the aortic root. IVC/PA: Normal IVC size and normal respiratory  collapse consistent with normal right atrial pressure. Mitral Valve: Mild mitral valve leaflet calcification. Mild to moderate (2+) mitral valve  regurgitation. No mitral valve stenosis. Tricuspid Valve: Mild (1+) tricuspid  regurgitation. The pulmonary artery pressure is normal.   Pulmonic Valve: Mild  pulmonic regurgitation. Pericardium: Normal pericardium with no significant  pericardial effusion. Assessment:    1. Chronic systolic heart failure (HCC) with improved LVEF on bb; no aldosterone antagonist due to renal insuff   2.  Chronic atrial fibrillation s/p watchman Dr Drew Gipson   3. Coronary artery disease involving autologous artery coronary bypass graft without angina pectoris    4.       Renal insufficiency follows with Dr Kris Aldridge:   Take 20 mg of torsemide daily for the next 2 days and then 10 mg every other day  Continue all other medications  Continue to monitor BP and call me if >140  Blood work in 10 days  Let me know if not feeling well  RTO in 1 month    I appreciate the opportunity of cooperating in the care of this individual.    TIFFANIE Duran - CNS, CNS, 10/25/2022, 2:11 PM

## 2022-10-28 ENCOUNTER — OFFICE VISIT (OUTPATIENT)
Dept: FAMILY MEDICINE CLINIC | Age: 82
End: 2022-10-28
Payer: MEDICARE

## 2022-10-28 VITALS
RESPIRATION RATE: 12 BRPM | HEART RATE: 70 BPM | TEMPERATURE: 97.4 F | OXYGEN SATURATION: 98 % | BODY MASS INDEX: 20.55 KG/M2 | SYSTOLIC BLOOD PRESSURE: 122 MMHG | DIASTOLIC BLOOD PRESSURE: 70 MMHG | WEIGHT: 116 LBS

## 2022-10-28 DIAGNOSIS — I20.0 UNSTABLE ANGINA (HCC): Primary | ICD-10-CM

## 2022-10-28 DIAGNOSIS — N18.32 CHRONIC RENAL FAILURE, STAGE 3B (HCC): ICD-10-CM

## 2022-10-28 DIAGNOSIS — Z95.818 PRESENCE OF WATCHMAN LEFT ATRIAL APPENDAGE CLOSURE DEVICE: ICD-10-CM

## 2022-10-28 DIAGNOSIS — Z23 NEED FOR INFLUENZA VACCINATION: ICD-10-CM

## 2022-10-28 DIAGNOSIS — R49.0 HOARSENESS, PERSISTENT: ICD-10-CM

## 2022-10-28 PROCEDURE — G8427 DOCREV CUR MEDS BY ELIG CLIN: HCPCS | Performed by: FAMILY MEDICINE

## 2022-10-28 PROCEDURE — G8420 CALC BMI NORM PARAMETERS: HCPCS | Performed by: FAMILY MEDICINE

## 2022-10-28 PROCEDURE — 3074F SYST BP LT 130 MM HG: CPT | Performed by: FAMILY MEDICINE

## 2022-10-28 PROCEDURE — 3078F DIAST BP <80 MM HG: CPT | Performed by: FAMILY MEDICINE

## 2022-10-28 PROCEDURE — G8399 PT W/DXA RESULTS DOCUMENT: HCPCS | Performed by: FAMILY MEDICINE

## 2022-10-28 PROCEDURE — 1036F TOBACCO NON-USER: CPT | Performed by: FAMILY MEDICINE

## 2022-10-28 PROCEDURE — G0008 ADMIN INFLUENZA VIRUS VAC: HCPCS | Performed by: FAMILY MEDICINE

## 2022-10-28 PROCEDURE — 1111F DSCHRG MED/CURRENT MED MERGE: CPT | Performed by: FAMILY MEDICINE

## 2022-10-28 PROCEDURE — 90694 VACC AIIV4 NO PRSRV 0.5ML IM: CPT | Performed by: FAMILY MEDICINE

## 2022-10-28 PROCEDURE — 99214 OFFICE O/P EST MOD 30 MIN: CPT | Performed by: FAMILY MEDICINE

## 2022-10-28 PROCEDURE — G8484 FLU IMMUNIZE NO ADMIN: HCPCS | Performed by: FAMILY MEDICINE

## 2022-10-28 PROCEDURE — 1090F PRES/ABSN URINE INCON ASSESS: CPT | Performed by: FAMILY MEDICINE

## 2022-10-28 PROCEDURE — 1124F ACP DISCUSS-NO DSCNMKR DOCD: CPT | Performed by: FAMILY MEDICINE

## 2022-10-28 NOTE — PROGRESS NOTES
Subjective:      Patient ID: Elmer Dowd is a 80 y.o. female. CC: Patient presents for hospital follow-up. HPI pt is here for a follow up with her daughter. Since last appointment time patient has been admitted to the hospital twice now for cardiac problems. Her first hospitalization was with September 10 through September 1 16th. 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 There was a delay on performing a PCI as the fistula needed to mature prior to procedure. She then was readmitted to the hospital October 19 through October 21 and at that point of time underwent PCI of left main and circumflex artery. She was also changed from metoprolol to Coreg and was restarted on Plavix medication. She overall feels she doing well and denies any further chest pain or shortness of breath symptoms. She also wants to discuss hoarseness which has been an ongoing issue for her. She feels her voice is hoarse most of the time but then sometimes becomes much worse to the point that she has difficulty getting words out. This is been ongoing for a number of years.     Review of Systems  Patient Active Problem List   Diagnosis    Mixed hyperlipidemia    Chronic gouty arthropathy    Acquired hypothyroidism    Non-rheumatic mitral regurgitation    COPD (chronic obstructive pulmonary disease) (HCC)    Restrictive lung disease    Sick sinus syndrome (HCC)    Allergic rhinitis    Fibrocystic breast    Cerebrovascular accident (CVA) ([facility]    HTN (hypertension), benign    Pacemaker    PAT (paroxysmal atrial tachycardia) (HCC)    Primary osteoarthritis involving multiple joints    Vitamin D deficiency    Tremor    Chronic total occlusion of native coronary artery    Age related osteoporosis    Chronic systolic heart failure (HCC)    Stage 3 chronic kidney disease ([facility]    PAD (peripheral artery disease) ([facility]    Atherosclerosis of native arteries of extremities with intermittent claudication, bilateral legs ([facility])    Idiopathic peripheral neuropathy    Ischemic cardiomyopathy    Peripheral vertigo, bilateral    PAF (paroxysmal atrial fibrillation) ([facility])    Dyslipidemia    Iron deficiency anemia    Anemia    Bilateral sensorineural hearing loss    Memory loss due to medical condition    Symptomatic bradycardia    Pacemaker lead failure    Presence of Watchman left atrial appendage closure device    Chronic right sacroiliac pain    Postcholecystectomy diarrhea    CKD (chronic kidney disease) stage 4, GFR 15-29 ml/min ([facility])    Unstable angina ([facility]    Coronary artery disease involving native heart without angina pectoris, unspecified vessel or lesion type       Outpatient Medications Marked as Taking for the 10/28/22 encounter (Office Visit) with Gabi Salas MD   Medication Sig Dispense Refill    rosuvastatin (CRESTOR) 5 MG tablet Take 1 tablet by mouth nightly 90 tablet 1    carvedilol (COREG) 25 MG tablet Take 1 tablet by mouth 2 times daily (with meals) (Patient taking differently: Take 25 mg by mouth 2 times daily (with meals) Per heart doc only to take 2 times if not under 110 BP) 60 tablet 3    aspirin EC 81 MG EC tablet Take 1 tablet by mouth daily 90 tablet 1    topiramate (TOPAMAX) 50 MG tablet TAKE 2 TABLETS TWICE DAILY 360 tablet 0    torsemide (DEMADEX) 20 MG tablet 10 mg on MWF 30 tablet 3    albuterol sulfate HFA (VENTOLIN HFA) 108 (90 Base) MCG/ACT inhaler Inhale 2 puffs into the lungs 4 times daily as needed for Wheezing 18 g 0    vitamin D (ERGOCALCIFEROL) 1.25 MG (68063 UT) CAPS capsule TAKE 1 CAPSULE ONCE A WEEK 12 capsule 1    clopidogrel (PLAVIX) 75 MG tablet Take 1 tablet by mouth in the morning.  30 tablet 3    allopurinol (ZYLOPRIM) 100 MG tablet TAKE 1 TABLET EVERY DAY 90 tablet 1    levothyroxine (SYNTHROID) 125 MCG tablet Take 1 tablet by mouth Daily 90 tablet 1    nitroGLYCERIN (NITROLINGUAL) 0.4 MG/SPRAY 0.4 mg spray Place 1 spray under the tongue every 5 minutes as needed for Chest pain 4.9 g 1    fluticasone (FLONASE) 50 MCG/ACT nasal spray 2 sprays by Each Nostril route daily 16 g 0       Allergies   Allergen Reactions    Aspirin Nausea Only    Diltiazem Anaphylaxis    Diltiazem Hcl Anaphylaxis    Lorazepam Other (See Comments)     hallucinations  hallucinations  hallucinations    Sulfa Antibiotics Rash and Hives    Atorvastatin Other (See Comments)     Muscle pains  Muscle pains  Muscle pains    Dabigatran Nausea Only     And indigestion  And indigestion    Aka Pradaxa  And indigestion  And indigestion  And indigestion    Aka Pradaxa  And indigestion  And indigestion  And indigestion    Aka Pradaxa    Mysoline [Primidone]     Nsaids      Other reaction(s): Unknown    Other Other (See Comments)     Nitroglycerin patches causes severe headaches    Primidone      Other reaction(s): Unknown       Social History     Tobacco Use    Smoking status: Former     Packs/day: 1.00     Years: 28.00     Pack years: 28.00     Types: Cigarettes     Quit date: 1987     Years since quittin.8    Smokeless tobacco: Never    Tobacco comments:     H.O.smoking at age 15 / smoked up to 1 p.p.d / quit    Substance Use Topics    Alcohol use: Yes     Comment: social       /70 (Site: Right Upper Arm, Position: Sitting, Cuff Size: Medium Adult)   Pulse 70   Temp 97.4 °F (36.3 °C) (Infrared)   Resp 12   Wt 116 lb (52.6 kg)   SpO2 98%   BMI 20.55 kg/m²      Objective:   Physical Exam  Constitutional:       General: She is not in acute distress. Appearance: She is well-developed. HENT:      Right Ear: Tympanic membrane normal.      Left Ear: Tympanic membrane normal.      Nose: Nose normal.      Mouth/Throat:      Mouth: Mucous membranes are moist.   Neck:      Vascular: No carotid bruit. Cardiovascular:      Rate and Rhythm: Normal rate. Pulses:           Dorsalis pedis pulses are 2+ on the right side and 2+ on the left side. Posterior tibial pulses are 2+ on the right side and 2+ on the left side. Heart sounds: Murmur heard. Systolic (Right sternal border) murmur is present with a grade of 2/6. No friction rub. No gallop. Pulmonary:      Effort: Pulmonary effort is normal.      Breath sounds: Normal breath sounds. Musculoskeletal:      Right hand: No tenderness. Normal range of motion. Left hand: No tenderness. Normal range of motion. Right lower leg: No deformity or tenderness. No edema. Left lower leg: No tenderness. No edema. Neurological:      Mental Status: She is alert and oriented to person, place, and time. Sensory: Sensation is intact. Motor: Tremor present. No weakness or atrophy. Gait: Gait is intact. Psychiatric:         Behavior: Behavior is cooperative. Assessment:      Ish Rosa was seen today for follow-up, hyperlipidemia and hypertension. Diagnoses and all orders for this visit:    Unstable angina ([address]    Need for influenza vaccination  -     Influenza, FLUAD, (age 72 y+), IM, PF, 0.5 mL    Chronic renal failure, stage 3b ([address]    Presence of Watchman left atrial appendage closure device    Hoarseness, persistent          Plan:      Hospital information reviewed with patient and daughter    Agreed that her cardiac condition is stable at this point in time and she should continue with current medications with readjustment of her blood pressure management while she was in the hospital.    Hoarseness issue is probably related to acid reflux symptoms. She does not really want a pursue ENT consultation and she does not want a procedures at this time. Recommended acid reduction and not laying down after eating meals.     Kidney failure has improved from earlier in the year I recommend continue with current blood pressure management minimize salt in her diet and avoid nephrotoxic medications. RTC 3 months    Medical decision making of moderate complexity. Please note that this chart was generated using Dragon dictation software. Although every effort was made to ensure the accuracy of this automated transcription, some errors in transcription may have occurred.

## 2022-10-29 PROBLEM — I20.0 UNSTABLE ANGINA (HCC): Status: RESOLVED | Noted: 2022-10-19 | Resolved: 2022-10-29

## 2022-10-29 PROBLEM — N18.32 CHRONIC RENAL FAILURE, STAGE 3B (HCC): Status: ACTIVE | Noted: 2022-06-28

## 2022-10-29 PROBLEM — R49.0 HOARSENESS, PERSISTENT: Status: ACTIVE | Noted: 2022-10-29

## 2022-11-07 ENCOUNTER — NURSE ONLY (OUTPATIENT)
Dept: CARDIOLOGY CLINIC | Age: 82
End: 2022-11-07
Payer: MEDICARE

## 2022-11-07 ENCOUNTER — OFFICE VISIT (OUTPATIENT)
Dept: CARDIOLOGY CLINIC | Age: 82
End: 2022-11-07
Payer: MEDICARE

## 2022-11-07 VITALS
HEIGHT: 63 IN | SYSTOLIC BLOOD PRESSURE: 120 MMHG | DIASTOLIC BLOOD PRESSURE: 70 MMHG | BODY MASS INDEX: 20.27 KG/M2 | OXYGEN SATURATION: 99 % | WEIGHT: 114.4 LBS | HEART RATE: 73 BPM

## 2022-11-07 DIAGNOSIS — R00.1 SYMPTOMATIC BRADYCARDIA: ICD-10-CM

## 2022-11-07 DIAGNOSIS — I63.9 CEREBROVASCULAR ACCIDENT (CVA), UNSPECIFIED MECHANISM (HCC): ICD-10-CM

## 2022-11-07 DIAGNOSIS — I25.5 ISCHEMIC CARDIOMYOPATHY: ICD-10-CM

## 2022-11-07 DIAGNOSIS — I48.0 PAF (PAROXYSMAL ATRIAL FIBRILLATION) (HCC): Primary | ICD-10-CM

## 2022-11-07 DIAGNOSIS — Z95.818 PRESENCE OF WATCHMAN LEFT ATRIAL APPENDAGE CLOSURE DEVICE: ICD-10-CM

## 2022-11-07 DIAGNOSIS — Z95.0 PACEMAKER: ICD-10-CM

## 2022-11-07 DIAGNOSIS — I10 HTN (HYPERTENSION), BENIGN: ICD-10-CM

## 2022-11-07 DIAGNOSIS — I34.0 NON-RHEUMATIC MITRAL REGURGITATION: ICD-10-CM

## 2022-11-07 DIAGNOSIS — I49.5 SICK SINUS SYNDROME (HCC): ICD-10-CM

## 2022-11-07 PROCEDURE — 99214 OFFICE O/P EST MOD 30 MIN: CPT | Performed by: NURSE PRACTITIONER

## 2022-11-07 PROCEDURE — 1124F ACP DISCUSS-NO DSCNMKR DOCD: CPT | Performed by: NURSE PRACTITIONER

## 2022-11-07 PROCEDURE — 1111F DSCHRG MED/CURRENT MED MERGE: CPT | Performed by: NURSE PRACTITIONER

## 2022-11-07 PROCEDURE — G8484 FLU IMMUNIZE NO ADMIN: HCPCS | Performed by: NURSE PRACTITIONER

## 2022-11-07 PROCEDURE — 3074F SYST BP LT 130 MM HG: CPT | Performed by: NURSE PRACTITIONER

## 2022-11-07 PROCEDURE — 1036F TOBACCO NON-USER: CPT | Performed by: NURSE PRACTITIONER

## 2022-11-07 PROCEDURE — 93000 ELECTROCARDIOGRAM COMPLETE: CPT | Performed by: NURSE PRACTITIONER

## 2022-11-07 PROCEDURE — G8399 PT W/DXA RESULTS DOCUMENT: HCPCS | Performed by: NURSE PRACTITIONER

## 2022-11-07 PROCEDURE — 93280 PM DEVICE PROGR EVAL DUAL: CPT | Performed by: INTERNAL MEDICINE

## 2022-11-07 PROCEDURE — G8420 CALC BMI NORM PARAMETERS: HCPCS | Performed by: NURSE PRACTITIONER

## 2022-11-07 PROCEDURE — 1090F PRES/ABSN URINE INCON ASSESS: CPT | Performed by: NURSE PRACTITIONER

## 2022-11-07 PROCEDURE — 3078F DIAST BP <80 MM HG: CPT | Performed by: NURSE PRACTITIONER

## 2022-11-07 PROCEDURE — G8427 DOCREV CUR MEDS BY ELIG CLIN: HCPCS | Performed by: NURSE PRACTITIONER

## 2022-11-07 RX ORDER — CARVEDILOL 25 MG/1
25 TABLET ORAL 2 TIMES DAILY WITH MEALS
Qty: 180 TABLET | Refills: 3 | Status: SHIPPED | OUTPATIENT
Start: 2022-11-07

## 2022-11-07 RX ORDER — ROSUVASTATIN CALCIUM 5 MG/1
5 TABLET, COATED ORAL NIGHTLY
Qty: 90 TABLET | Refills: 1 | Status: SHIPPED | OUTPATIENT
Start: 2022-11-07

## 2022-11-07 RX ORDER — LEVOTHYROXINE SODIUM 0.12 MG/1
125 TABLET ORAL DAILY
Qty: 90 TABLET | Refills: 0 | Status: SHIPPED | OUTPATIENT
Start: 2022-11-07

## 2022-11-07 NOTE — LETTER
[facility]  EP Procedure Sheet    11/7/22  Mohsen Hendrix  1940  EP Procedures  [] Pacemaker implant (single/dual) [] EP Study   [] ICD implant (single/dual) [] Atrial flutter ablation (DONA Y/N)   [] Biv implant ICD [] Tilt Table   [] Biv implant PPM [] Atrial fibrillation ablation (DONA Yes)   [] Generator Change (PPM/ICD/BiV) [] SVT ablation   [] Lead revision (RV/LA/RA) (<1 month) [] PVC ablation     [] Lead extraction +/- upgrade (BiV/PPM/ICD) [] VT Ischemic/ non-ischemic   [] Loop implant/ removal [] VT RVOT   [] Cardioversion [] VT Left sided   [x] DONA (s/p watchman) [] AVN ablation   Equipment  [] [employer]  [] REBECA Mapping System   [] [place] [] [address]   [] [employer] [] CryoAblation   [] Biotronik [] Laser Lead Extraction   EP Procedures Scheduling Request  # hours Requested  []1 []2 []2-4 [] 4-6 Scheduled  Date:   Specific Day January 2023 Completed    Anesthesia []yes []no F/u Date:   CT surgery backup []yes []no COVID     Overnight stay      Performing MD  [x]RMM []MXA   []MKW [] CMV First vs repeat   []1st [] 2nd [] 3rd   Pre-Procedure Labs / Imaging  [] PT/INR [] Type & cross   [] CBC [] Units PRBC   [] BMP/Mg [] Units FFP   [] Venogram [] Cardiac CTA for Pulmonary vein mapping     RN INITIALS: RS    Patient Instructions  Do not eat or drink after midnight the night prior to procedure  Dx:Presence of Watchman  ICD-10 code: Z95.818

## 2022-11-10 DIAGNOSIS — M15.9 PRIMARY OSTEOARTHRITIS INVOLVING MULTIPLE JOINTS: ICD-10-CM

## 2022-11-10 RX ORDER — HYDROCODONE BITARTRATE AND ACETAMINOPHEN 5; 325 MG/1; MG/1
1 TABLET ORAL 4 TIMES DAILY PRN
Qty: 120 TABLET | Refills: 0 | Status: SHIPPED | OUTPATIENT
Start: 2022-11-10 | End: 2022-12-10

## 2022-11-10 NOTE — TELEPHONE ENCOUNTER
Medication:   Requested Prescriptions     Pending Prescriptions Disp Refills    HYDROcodone-acetaminophen (NORCO) 5-325 MG per tablet 120 tablet 0     Sig: Take 1 tablet by mouth 4 times daily as needed for Pain for up to 30 days. Last Filled:  9/26/22    Patient Phone Number: [phone] (home) [phone] (work)    Last appt: 10/28/2022   Next appt: 1/30/2023    Last OARRS:   RX Monitoring 6/1/2021   Attestation -   Periodic Controlled Substance Monitoring Possible medication side effects, risk of tolerance/dependence & alternative treatments discussed.      PDMP Monitoring:    Last PDMP [facility] as Reviewed [facility]):  Review User Review Instant Review Result   Martín Raheem 4/21/2022  4:20 PM Reviewed PDMP [1]     Preferred Pharmacy:   [facility] [id number] - [address] [phone] [address] [phone]  [address] [place] [address]  Phone: [phone] Fax: [phone]

## 2022-11-11 ENCOUNTER — TELEPHONE (OUTPATIENT)
Dept: OTHER | Facility: CLINIC | Age: 82
End: 2022-11-11

## 2022-11-11 ENCOUNTER — APPOINTMENT (OUTPATIENT)
Dept: GENERAL RADIOLOGY | Age: 82
DRG: 280 | End: 2022-11-11
Payer: MEDICARE

## 2022-11-11 ENCOUNTER — HOSPITAL ENCOUNTER (INPATIENT)
Age: 82
LOS: 5 days | Discharge: HOME HEALTH | DRG: 280 | End: 2022-11-16
Attending: EMERGENCY MEDICINE | Admitting: INTERNAL MEDICINE
Payer: MEDICARE

## 2022-11-11 ENCOUNTER — APPOINTMENT (OUTPATIENT)
Dept: CT IMAGING | Age: 82
DRG: 280 | End: 2022-11-11
Payer: MEDICARE

## 2022-11-11 DIAGNOSIS — I21.4 NSTEMI (NON-ST ELEVATED MYOCARDIAL INFARCTION) (HCC): Primary | ICD-10-CM

## 2022-11-11 PROBLEM — J96.01 ACUTE RESPIRATORY FAILURE WITH HYPOXIA (HCC): Status: ACTIVE | Noted: 2022-11-11

## 2022-11-11 LAB
A/G RATIO: 1.4 (ref 1.1–2.2)
ALBUMIN SERPL-MCNC: 3.9 G/DL (ref 3.4–5)
ALP BLD-CCNC: 95 U/L (ref 40–129)
ALT SERPL-CCNC: 8 U/L (ref 10–40)
ANION GAP SERPL CALCULATED.3IONS-SCNC: 13 MMOL/L (ref 3–16)
AST SERPL-CCNC: 17 U/L (ref 15–37)
BASOPHILS ABSOLUTE: 0.2 K/UL (ref 0–0.2)
BASOPHILS RELATIVE PERCENT: 1 %
BILIRUB SERPL-MCNC: 0.6 MG/DL (ref 0–1)
BUN BLDV-MCNC: 37 MG/DL (ref 7–20)
CALCIUM SERPL-MCNC: 9.4 MG/DL (ref 8.3–10.6)
CHLORIDE BLD-SCNC: 111 MMOL/L (ref 99–110)
CO2: 16 MMOL/L (ref 21–32)
CREAT SERPL-MCNC: 1.3 MG/DL (ref 0.6–1.2)
EOSINOPHILS ABSOLUTE: 0.4 K/UL (ref 0–0.6)
EOSINOPHILS RELATIVE PERCENT: 1.8 %
GFR SERPL CREATININE-BSD FRML MDRD: 41 ML/MIN/{1.73_M2}
GLUCOSE BLD-MCNC: 143 MG/DL (ref 70–99)
HCT VFR BLD CALC: 48.8 % (ref 36–48)
HEMOGLOBIN: 14.7 G/DL (ref 12–16)
LACTIC ACID: 1.7 MMOL/L (ref 0.4–2)
LYMPHOCYTES ABSOLUTE: 1.2 K/UL (ref 1–5.1)
LYMPHOCYTES RELATIVE PERCENT: 5.6 %
MCH RBC QN AUTO: 24.6 PG (ref 26–34)
MCHC RBC AUTO-ENTMCNC: 30 G/DL (ref 31–36)
MCV RBC AUTO: 82 FL (ref 80–100)
MONOCYTES ABSOLUTE: 0.6 K/UL (ref 0–1.3)
MONOCYTES RELATIVE PERCENT: 2.7 %
NEUTROPHILS ABSOLUTE: 18.4 K/UL (ref 1.7–7.7)
NEUTROPHILS RELATIVE PERCENT: 88.9 %
PDW BLD-RTO: 20 % (ref 12.4–15.4)
PLATELET # BLD: 467 K/UL (ref 135–450)
PMV BLD AUTO: 9.8 FL (ref 5–10.5)
POTASSIUM REFLEX MAGNESIUM: 4.3 MMOL/L (ref 3.5–5.1)
PRO-BNP: 8979 PG/ML (ref 0–449)
RBC # BLD: 5.95 M/UL (ref 4–5.2)
REASON FOR REJECTION: NORMAL
REJECTED TEST: NORMAL
SODIUM BLD-SCNC: 140 MMOL/L (ref 136–145)
TOTAL PROTEIN: 6.6 G/DL (ref 6.4–8.2)
TROPONIN: 0.04 NG/ML
WBC # BLD: 20.6 K/UL (ref 4–11)

## 2022-11-11 PROCEDURE — 71045 X-RAY EXAM CHEST 1 VIEW: CPT

## 2022-11-11 PROCEDURE — 83605 ASSAY OF LACTIC ACID: CPT

## 2022-11-11 PROCEDURE — 84484 ASSAY OF TROPONIN QUANT: CPT

## 2022-11-11 PROCEDURE — 71260 CT THORAX DX C+: CPT | Performed by: EMERGENCY MEDICINE

## 2022-11-11 PROCEDURE — 93005 ELECTROCARDIOGRAM TRACING: CPT | Performed by: EMERGENCY MEDICINE

## 2022-11-11 PROCEDURE — 2060000000 HC ICU INTERMEDIATE R&B

## 2022-11-11 PROCEDURE — 87040 BLOOD CULTURE FOR BACTERIA: CPT

## 2022-11-11 PROCEDURE — 36415 COLL VENOUS BLD VENIPUNCTURE: CPT

## 2022-11-11 PROCEDURE — 6360000004 HC RX CONTRAST MEDICATION: Performed by: EMERGENCY MEDICINE

## 2022-11-11 PROCEDURE — 99285 EMERGENCY DEPT VISIT HI MDM: CPT

## 2022-11-11 PROCEDURE — 6360000002 HC RX W HCPCS: Performed by: EMERGENCY MEDICINE

## 2022-11-11 PROCEDURE — 80053 COMPREHEN METABOLIC PANEL: CPT

## 2022-11-11 PROCEDURE — 83880 ASSAY OF NATRIURETIC PEPTIDE: CPT

## 2022-11-11 PROCEDURE — 85025 COMPLETE CBC W/AUTO DIFF WBC: CPT

## 2022-11-11 PROCEDURE — 51798 US URINE CAPACITY MEASURE: CPT

## 2022-11-11 PROCEDURE — 6370000000 HC RX 637 (ALT 250 FOR IP): Performed by: EMERGENCY MEDICINE

## 2022-11-11 PROCEDURE — 96374 THER/PROPH/DIAG INJ IV PUSH: CPT

## 2022-11-11 RX ORDER — ASPIRIN 81 MG/1
324 TABLET, CHEWABLE ORAL ONCE
Status: COMPLETED | OUTPATIENT
Start: 2022-11-11 | End: 2022-11-11

## 2022-11-11 RX ORDER — FUROSEMIDE 10 MG/ML
40 INJECTION INTRAMUSCULAR; INTRAVENOUS ONCE
Status: COMPLETED | OUTPATIENT
Start: 2022-11-11 | End: 2022-11-11

## 2022-11-11 RX ORDER — NITROGLYCERIN 0.4 MG/1
0.4 TABLET SUBLINGUAL EVERY 5 MIN PRN
Status: DISCONTINUED | OUTPATIENT
Start: 2022-11-11 | End: 2022-11-16 | Stop reason: HOSPADM

## 2022-11-11 RX ADMIN — FUROSEMIDE 40 MG: 10 INJECTION, SOLUTION INTRAMUSCULAR; INTRAVENOUS at 21:44

## 2022-11-11 RX ADMIN — ASPIRIN 324 MG: 81 TABLET, CHEWABLE ORAL at 21:51

## 2022-11-11 RX ADMIN — IOPAMIDOL 75 ML: 755 INJECTION, SOLUTION INTRAVENOUS at 20:28

## 2022-11-11 ASSESSMENT — ENCOUNTER SYMPTOMS
EYES NEGATIVE: 1
BACK PAIN: 1
SHORTNESS OF BREATH: 1
GASTROINTESTINAL NEGATIVE: 1
COUGH: 0

## 2022-11-11 ASSESSMENT — LIFESTYLE VARIABLES: HOW OFTEN DO YOU HAVE A DRINK CONTAINING ALCOHOL: NEVER

## 2022-11-11 NOTE — PROGRESS NOTES
Patient comes in for programming evaluation for her pacemaker. All sensing and pacing parameters are within normal range. Battery life 12.5 years  AP 88%.  6%. AT/AF with a <1% burden. Last on 9/14/2022, longest 8 hours 17 minutes. RVR. Patient remains on Coreg. Patient has watchman (5/2021). ~ 3 mm feliciano-device leak on DONA (7/22)   Per NPSR note- Discussed with Dr. Fito Joyner as she had 3 mm leak on DONA in July. Will repeat DONA at 6 months (January 2023) from prior DONA to reassess feliciano-device leak   Please see interrogation for more detail. Patient will see NPSR today and follow up in 3 months office or remotely.

## 2022-11-11 NOTE — TELEPHONE ENCOUNTER
Writer contacted Dr Jm Echols to inform of 30 day readmission risk. No Decision on disposition at this time.     Call Back: If you need to call back to inform of disposition you can contact me at [phone]

## 2022-11-11 NOTE — ED NOTES
Upon arrival to room 8 patient very sob and anxious. Nailbeds blue. Dr Margurette Goldmann called into room. Unable to get IV or labs. IV from squad has blood at insertion site. dsg changed. Flushes without difficulty. Mental status is poor. States daughter knows everything. Placed on 2 liters of oxygen for comfort.        Alicja Parra RN  11/11/22 3198

## 2022-11-11 NOTE — ED PROVIDER NOTES
[address] PROVIDER NOTE    Patient Identification  Pt Name: Rachel Abdul  MRN: [id number]  Angel 1940  Date of evaluation: 11/11/2022  Provider: Effie Hill MD  PCP: Milton Torres MD    Chief Complaint  Shortness of Breath (From home by [facility] Had 2 stents placed this week)      HPI  History provided by patient   This is a 80 y.o. female who presents to the ED for shortness of breath. Started about 24 hours ago. No nausea or vomiting. No fevers or chills or cough. No numbness or tingling. No abdominal pain. No other symptoms. Feels very hot/flushed. Per daughter, is confused. Presents similarly to when had heart failure in the past    ROS  12 systems reviewed, pertinent positives/negatives per HPI otherwise noted to be negative.     I have reviewed the following nursing documentation:  Allergies: Aspirin, Diltiazem, Diltiazem hcl, Lorazepam, Sulfa antibiotics, Atorvastatin, Dabigatran, Mysoline [primidone], Nsaids, Other, and Primidone    Past medical history:   Past Medical History:   Diagnosis Date    Allergic rhinitis, cause unspecified     Arthritis     Atrial fibrillation ([facility]     Bronchopneumonia     CAD (coronary artery disease)     stent:  post cataract surgery (CABG)    Cerebral artery occlusion with cerebral infarction [facility])     TIA    Chronic gouty arthropathy     Chronic kidney disease     Congestive heart failure, unspecified     Degeneration of cervical intervertebral disc     Essential and other specified forms of tremor     Gout     HIGH CHOLESTEROL     History of CVA (cerebrovascular accident)     Hx of blood clots     Hypertension     Influenza 12/23/2017    Leakage of Watchman left atrial appendage closure device     Mitral valve stenosis and aortic valve insufficiency     Movement disorder     back problems    Pacemaker     Peptic ulcer, unspecified site, unspecified as acute or chronic, without mention of hemorrhage, perforation, or obstruction     Thyroid disease     Unspecified disorder of kidney and ureter     Unspecified hypothyroidism      Past surgical history:   Past Surgical History:   Procedure Laterality Date    BACK SURGERY      CARDIAC CATHETERIZATION  7/2012    CARDIAC CATHETERIZATION  08/07/2018    Unsuccesful  of RCA    CARDIAC SURGERY      CABG & Cardiac ablation    CHOLECYSTECTOMY      CORONARY ARTERY BYPASS GRAFT  1987    LIMA- Diag/LAD, SVG- RCA    FEMORAL BYPASS Left 8/22/2019    LEFT FEMORAL TO POPLITEAL BYPASS GRAFT performed by Ghassan Kern MD at [facility] GRAFT N/A 8/22/2019    FEMORAL TO FEMORAL BYPASS performed by Ghassan Kern MD at [address] Left 03/16/2017    Left hip pinning    IR KYPHOPLASTY LUMBAR FIRST LEVEL  5/14/2021    IR KYPHOPLASTY LUMBAR FIRST LEVEL 5/14/2021 [facility] SPECIAL PROCEDURES    JOINT REPLACEMENT      OK NJX AA&/STRD TFRML EPI LUMBAR/SACRAL 1 LEVEL Right 12/3/2018    RIGHT L3 AND L4 LUMBAR TRANSFORAMINAL EPIRUAL STEROID INJECTION WITH FLUOROSCOPY performed by Lewis Linares MD at [facility]    PTCA  11/04/2014    MARLENY - 3.0 x 28 to the Ist Diag    SHOULDER SURGERY      left       Home medications:   Previous Medications    ALBUTEROL SULFATE HFA (VENTOLIN HFA) 108 (90 BASE) MCG/ACT INHALER    Inhale 2 puffs into the lungs 4 times daily as needed for Wheezing    ALLOPURINOL (ZYLOPRIM) 100 MG TABLET    TAKE 1 TABLET EVERY DAY    ASPIRIN EC 81 MG EC TABLET    Take 1 tablet by mouth daily    CARVEDILOL (COREG) 25 MG TABLET    Take 1 tablet by mouth 2 times daily (with meals)    CLOPIDOGREL (PLAVIX) 75 MG TABLET    Take 1 tablet by mouth in the morning. FLUTICASONE (FLONASE) 50 MCG/ACT NASAL SPRAY    2 sprays by Each Nostril route daily    HYDROCODONE-ACETAMINOPHEN (NORCO) 5-325 MG PER TABLET    Take 1 tablet by mouth 4 times daily as needed for Pain for up to 30 days.     LEVOTHYROXINE (SYNTHROID) 125 MCG TABLET    Take 1 tablet by mouth Daily NITROGLYCERIN (NITROLINGUAL) 0.4 MG/SPRAY 0.4 MG SPRAY    Place 1 spray under the tongue every 5 minutes as needed for Chest pain    ROSUVASTATIN (CRESTOR) 5 MG TABLET    Take 1 tablet by mouth nightly    TOPIRAMATE (TOPAMAX) 50 MG TABLET    TAKE 2 TABLETS TWICE DAILY    TORSEMIDE (DEMADEX) 20 MG TABLET    10 mg on MWF    VITAMIN D (ERGOCALCIFEROL) 1.25 MG (69907 UT) CAPS CAPSULE    TAKE 1 CAPSULE ONCE A WEEK       Social history:  reports that she quit smoking about 35 years ago. Her smoking use included cigarettes. She has a 28.00 pack-year smoking history. She has never used smokeless tobacco. She reports current alcohol use. She reports that she does not use drugs. Family history:    Family History   Problem Relation Age of Onset    Cancer Sister     Heart Disease Sister     Diabetes Brother     Hypertension Brother     Heart Disease Brother     Stroke Maternal Aunt     Heart Disease Maternal Aunt     Diabetes Maternal Uncle     Hypertension Paternal Aunt     Cancer Maternal Grandmother     Cancer Paternal Grandmother     Rheum Arthritis Neg Hx     Lupus Neg Hx          Exam  ED Triage Vitals [11/11/22 1713]   BP Temp Temp Source Heart Rate Resp SpO2 Height Weight   (!) 175/87 98.6 °F (37 °C) Oral 97 22 97 % -- --     Nursing note and vitals reviewed. Constitutional: In no acute distress  HENT:      Head: Normocephalic      Ears: External ears normal.      Nose: Nose normal.     Mouth: Membrane mucosa moist   Eyes: No discharge. Neck: Supple. Trachea midline. Cardiovascular: Regular rate. Warm extremities  Pulmonary/Chest: Effort normal. No respiratory distress. Clear to auscultation bilaterally  Abdominal: Soft. No distension. Nontender  : Deferred  Rectal: Deferred   Musculoskeletal: Moves all extremities. No gross deformity. Neurological: Alert and oriented. Face symmetric. Speech is clear. Skin: Warm and dry. Psychiatric: Normal mood and affect.  Behavior is normal.    Procedures      EKG  The Ekg interpreted by me in the absence of a cardiologist shows. Normal sinus rhythm. No specific ST changes appreciated. HR 81  To millimeter ST depression lead V5, 1 mm ST depression in lead V6, worse compared to prior EKG 10/19    Radiology  CT CHEST PULMONARY EMBOLISM W CONTRAST   Preliminary Result   1. Motion limited evaluation with no evidence of acute pulmonary embolism to   the segmental level. 2. Pulmonary edema with small right and trace left pleural effusions. 3. Reflux of contrast into the hepatic veins can be seen in the setting of   right heart failure. 4. Unchanged enlargement of main pulmonary artery which can be seen in the   setting of pulmonary artery hypertension. 5. Chronic findings outlined in body of the report. XR CHEST PORTABLE   Final Result   Severe cardiomegaly, with vascular congestive changes and mild interstitial   pulmonary edema.              Labs  Results for orders placed or performed during the hospital encounter of 11/11/22   CBC with Auto Differential   Result Value Ref Range    WBC 20.6 (H) 4.0 - 11.0 K/uL    RBC 5.95 (H) 4.00 - 5.20 M/uL    Hemoglobin 14.7 12.0 - 16.0 g/dL    Hematocrit 48.8 (H) 36.0 - 48.0 %    MCV 82.0 80.0 - 100.0 fL    MCH 24.6 (L) 26.0 - 34.0 pg    MCHC 30.0 (L) 31.0 - 36.0 g/dL    RDW 20.0 (H) 12.4 - 15.4 %    Platelets 015 (H) 843 - 450 K/uL    MPV 9.8 5.0 - 10.5 fL    Neutrophils % 88.9 %    Lymphocytes % 5.6 %    Monocytes % 2.7 %    Eosinophils % 1.8 %    Basophils % 1.0 %    Neutrophils Absolute 18.4 (H) 1.7 - 7.7 K/uL    Lymphocytes Absolute 1.2 1.0 - 5.1 K/uL    Monocytes Absolute 0.6 0.0 - 1.3 K/uL    Eosinophils Absolute 0.4 0.0 - 0.6 K/uL    Basophils Absolute 0.2 0.0 - 0.2 K/uL   SPECIMEN REJECTION   Result Value Ref Range    Rejected Test bnp,cmpx,trop     Reason for Rejection see below    Brain Natriuretic Peptide   Result Value Ref Range    Pro-BNP 8,979 (H) 0 - 449 pg/mL   Comprehensive Metabolic Panel w/ Reflex to MG Result Value Ref Range    Sodium 140 136 - 145 mmol/L    Potassium reflex Magnesium 4.3 3.5 - 5.1 mmol/L    Chloride 111 (H) 99 - 110 mmol/L    CO2 16 (L) 21 - 32 mmol/L    Anion Gap 13 3 - 16    Glucose 143 (H) 70 - 99 mg/dL    BUN 37 (H) 7 - 20 mg/dL    Creatinine 1.3 (H) 0.6 - 1.2 mg/dL    Est, Glom Filt Rate 41 (A) >60    Calcium 9.4 8.3 - 10.6 mg/dL    Total Protein 6.6 6.4 - 8.2 g/dL    Albumin 3.9 3.4 - 5.0 g/dL    Albumin/Globulin Ratio 1.4 1.1 - 2.2    Total Bilirubin 0.6 0.0 - 1.0 mg/dL    Alkaline Phosphatase 95 40 - 129 U/L    ALT 8 (L) 10 - 40 U/L    AST 17 15 - 37 U/L   Troponin   Result Value Ref Range    Troponin 0.04 (H) <0.01 ng/mL   Lactic Acid   Result Value Ref Range    Lactic Acid 1.7 0.4 - 2.0 mmol/L       Screenings   Ronit Coma Scale  Eye Opening: Spontaneous  Best Verbal Response: Confused  Best Motor Response: Obeys commands  [place] Coma Scale Score: 14       MDM and ED Course  This is a 80 y.o. female who presents to the ED for  shortness of breath. Recent stents placed 1 month ago. New oxygen requirement of 2 L. EKG shows ST depressions in lead V5, V6 without elevations. Troponin elevated. Has NSTEMI. Unclear if type 1 or 2. Consulting cardiology for further recommendations. Has aspirin allergy. CT chest shows no pulmonary embolism or pneumonia. Does show pulmonary edema and small pleural effusion. Likely has some element of failure. Giving some Lasix. Work of breathing is okay. I do not think that she needs BiPAP at this time we will. She is answering questions appropriately but has some confusion per daughter. No headache to indicate intracranial bleed. No focal neurodeficits to indicate stroke. Will check for UTI. Will admit to hospitalist service.     Discussed with Cardiology service Dr Elba Smith who did not believe needed emergent cath and agrees this is likely more CHF    The total critical care time spent while evaluating and treating this patient was at least 32 minutes. This excludes time spent doing separately billable procedures. This includes time at the bedside, data interpretation, medication management, obtaining critical history from collateral sources if the patient is unable to provide it directly, and physician consultation. Specifics of interventions taken and potentially life-threatening diagnostic considerations are listed above in the medical decision making. [unfilled]    Is this patient to be included in the SEP-1 Core Measure due to severe sepsis or septic shock? No   Exclusion criteria - the patient is NOT to be included for SEP-1 Core Measure due to:  2+ SIRS criteria are not met        Final Impression  1. NSTEMI (non-ST elevated myocardial infarction) (HCC)        Blood pressure (!) 152/54, pulse 74, temperature 98.6 °F (37 °C), temperature source Oral, resp. rate 28, SpO2 98 %, not currently breastfeeding. Disposition:  DISPOSITION Admitted 11/11/2022 10:59:31 PM      Patient Referrals:  No follow-up provider specified. Discharge Medications:  New Prescriptions    No medications on file       Discontinued Medications:  Discontinued Medications    No medications on file       This chart was generated using the [address] [place] [address] dictation system. I created this record but it may contain dictation errors given the limitations of this technology.         Marianne Gaxiola MD  11/11/22 6799

## 2022-11-12 PROBLEM — I50.42 CHRONIC COMBINED SYSTOLIC (CONGESTIVE) AND DIASTOLIC (CONGESTIVE) HEART FAILURE (HCC): Status: ACTIVE | Noted: 2018-12-28

## 2022-11-12 LAB
A/G RATIO: 1.5 (ref 1.1–2.2)
ALBUMIN SERPL-MCNC: 3.8 G/DL (ref 3.4–5)
ALP BLD-CCNC: 87 U/L (ref 40–129)
ALT SERPL-CCNC: 9 U/L (ref 10–40)
ANION GAP SERPL CALCULATED.3IONS-SCNC: 14 MMOL/L (ref 3–16)
ANTI-XA UNFRAC HEPARIN: 0.2 IU/ML (ref 0.3–0.7)
ANTI-XA UNFRAC HEPARIN: <0.1 IU/ML (ref 0.3–0.7)
ANTI-XA UNFRAC HEPARIN: <0.1 IU/ML (ref 0.3–0.7)
AST SERPL-CCNC: 17 U/L (ref 15–37)
BACTERIA: ABNORMAL /HPF
BACTERIA: NORMAL /HPF
BASOPHILS ABSOLUTE: 0.1 K/UL (ref 0–0.2)
BASOPHILS RELATIVE PERCENT: 0.7 %
BILIRUB SERPL-MCNC: 0.5 MG/DL (ref 0–1)
BILIRUBIN URINE: NEGATIVE
BILIRUBIN URINE: NEGATIVE
BLOOD, URINE: ABNORMAL
BLOOD, URINE: ABNORMAL
BUN BLDV-MCNC: 40 MG/DL (ref 7–20)
C-REACTIVE PROTEIN: 87.7 MG/L (ref 0–5.1)
CALCIUM SERPL-MCNC: 9.1 MG/DL (ref 8.3–10.6)
CHLORIDE BLD-SCNC: 112 MMOL/L (ref 99–110)
CLARITY: CLEAR
CLARITY: CLEAR
CO2: 18 MMOL/L (ref 21–32)
COLOR: YELLOW
COLOR: YELLOW
CREAT SERPL-MCNC: 1.3 MG/DL (ref 0.6–1.2)
EKG ATRIAL RATE: 93 BPM
EKG DIAGNOSIS: NORMAL
EKG P-R INTERVAL: 120 MS
EKG Q-T INTERVAL: 360 MS
EKG QRS DURATION: 98 MS
EKG QTC CALCULATION (BAZETT): 447 MS
EKG R AXIS: -17 DEGREES
EKG T AXIS: -75 DEGREES
EKG VENTRICULAR RATE: 93 BPM
EOSINOPHILS ABSOLUTE: 0.1 K/UL (ref 0–0.6)
EOSINOPHILS RELATIVE PERCENT: 0.6 %
EPITHELIAL CELLS, UA: 0 /HPF (ref 0–5)
EPITHELIAL CELLS, UA: 2 /HPF (ref 0–5)
GFR SERPL CREATININE-BSD FRML MDRD: 41 ML/MIN/{1.73_M2}
GLUCOSE BLD-MCNC: 133 MG/DL (ref 70–99)
GLUCOSE URINE: NEGATIVE MG/DL
GLUCOSE URINE: NEGATIVE MG/DL
HCT VFR BLD CALC: 44.2 % (ref 36–48)
HCT VFR BLD CALC: 46.1 % (ref 36–48)
HEMOGLOBIN: 13.8 G/DL (ref 12–16)
HEMOGLOBIN: 14.4 G/DL (ref 12–16)
HYALINE CASTS: 0 /LPF (ref 0–8)
HYALINE CASTS: 5 /LPF (ref 0–8)
INR BLD: 1.27 (ref 0.87–1.14)
KETONES, URINE: NEGATIVE MG/DL
KETONES, URINE: NEGATIVE MG/DL
LEUKOCYTE ESTERASE, URINE: ABNORMAL
LEUKOCYTE ESTERASE, URINE: ABNORMAL
LYMPHOCYTES ABSOLUTE: 1.1 K/UL (ref 1–5.1)
LYMPHOCYTES RELATIVE PERCENT: 5.9 %
MAGNESIUM: 2.2 MG/DL (ref 1.8–2.4)
MCH RBC QN AUTO: 25.2 PG (ref 26–34)
MCH RBC QN AUTO: 25.2 PG (ref 26–34)
MCHC RBC AUTO-ENTMCNC: 31.2 G/DL (ref 31–36)
MCHC RBC AUTO-ENTMCNC: 31.2 G/DL (ref 31–36)
MCV RBC AUTO: 80.8 FL (ref 80–100)
MCV RBC AUTO: 80.8 FL (ref 80–100)
MICROSCOPIC EXAMINATION: YES
MICROSCOPIC EXAMINATION: YES
MONOCYTES ABSOLUTE: 1 K/UL (ref 0–1.3)
MONOCYTES RELATIVE PERCENT: 5.5 %
NEUTROPHILS ABSOLUTE: 15.7 K/UL (ref 1.7–7.7)
NEUTROPHILS RELATIVE PERCENT: 87.3 %
NITRITE, URINE: NEGATIVE
NITRITE, URINE: NEGATIVE
PDW BLD-RTO: 19.6 % (ref 12.4–15.4)
PDW BLD-RTO: 20 % (ref 12.4–15.4)
PH UA: 5 (ref 5–8)
PH UA: 5 (ref 5–8)
PLATELET # BLD: 437 K/UL (ref 135–450)
PLATELET # BLD: 460 K/UL (ref 135–450)
PMV BLD AUTO: 9.7 FL (ref 5–10.5)
PMV BLD AUTO: 9.9 FL (ref 5–10.5)
POTASSIUM REFLEX MAGNESIUM: 3.5 MMOL/L (ref 3.5–5.1)
PROCALCITONIN: 0.29 NG/ML (ref 0–0.15)
PROTEIN UA: NEGATIVE MG/DL
PROTEIN UA: NEGATIVE MG/DL
PROTHROMBIN TIME: 15.9 SEC (ref 11.7–14.5)
RBC # BLD: 5.46 M/UL (ref 4–5.2)
RBC # BLD: 5.7 M/UL (ref 4–5.2)
RBC UA: 1 /HPF (ref 0–4)
RBC UA: 26 /HPF (ref 0–4)
SEDIMENTATION RATE, ERYTHROCYTE: 12 MM/HR (ref 0–30)
SODIUM BLD-SCNC: 144 MMOL/L (ref 136–145)
SPECIFIC GRAVITY UA: 1.01 (ref 1–1.03)
SPECIFIC GRAVITY UA: 1.02 (ref 1–1.03)
TOTAL PROTEIN: 6.4 G/DL (ref 6.4–8.2)
TROPONIN: 0.11 NG/ML
TROPONIN: 0.17 NG/ML
TROPONIN: 0.18 NG/ML
URINE REFLEX TO CULTURE: ABNORMAL
URINE REFLEX TO CULTURE: ABNORMAL
URINE TYPE: ABNORMAL
URINE TYPE: ABNORMAL
UROBILINOGEN, URINE: 0.2 E.U./DL
UROBILINOGEN, URINE: 0.2 E.U./DL
WBC # BLD: 18 K/UL (ref 4–11)
WBC # BLD: 18.5 K/UL (ref 4–11)
WBC UA: 2 /HPF (ref 0–5)
WBC UA: 4 /HPF (ref 0–5)

## 2022-11-12 PROCEDURE — 85025 COMPLETE CBC W/AUTO DIFF WBC: CPT

## 2022-11-12 PROCEDURE — 83735 ASSAY OF MAGNESIUM: CPT

## 2022-11-12 PROCEDURE — 84484 ASSAY OF TROPONIN QUANT: CPT

## 2022-11-12 PROCEDURE — 99223 1ST HOSP IP/OBS HIGH 75: CPT | Performed by: INTERNAL MEDICINE

## 2022-11-12 PROCEDURE — 2060000000 HC ICU INTERMEDIATE R&B

## 2022-11-12 PROCEDURE — 36415 COLL VENOUS BLD VENIPUNCTURE: CPT

## 2022-11-12 PROCEDURE — 85520 HEPARIN ASSAY: CPT

## 2022-11-12 PROCEDURE — 84145 PROCALCITONIN (PCT): CPT

## 2022-11-12 PROCEDURE — 93010 ELECTROCARDIOGRAM REPORT: CPT | Performed by: INTERNAL MEDICINE

## 2022-11-12 PROCEDURE — 2580000003 HC RX 258: Performed by: NURSE PRACTITIONER

## 2022-11-12 PROCEDURE — 80053 COMPREHEN METABOLIC PANEL: CPT

## 2022-11-12 PROCEDURE — 6370000000 HC RX 637 (ALT 250 FOR IP): Performed by: NURSE PRACTITIONER

## 2022-11-12 PROCEDURE — 6360000002 HC RX W HCPCS: Performed by: NURSE PRACTITIONER

## 2022-11-12 PROCEDURE — 85652 RBC SED RATE AUTOMATED: CPT

## 2022-11-12 PROCEDURE — 85610 PROTHROMBIN TIME: CPT

## 2022-11-12 PROCEDURE — 86140 C-REACTIVE PROTEIN: CPT

## 2022-11-12 PROCEDURE — 94760 N-INVAS EAR/PLS OXIMETRY 1: CPT

## 2022-11-12 PROCEDURE — 51702 INSERT TEMP BLADDER CATH: CPT

## 2022-11-12 PROCEDURE — 2700000000 HC OXYGEN THERAPY PER DAY

## 2022-11-12 PROCEDURE — 0202U NFCT DS 22 TRGT SARS-COV-2: CPT

## 2022-11-12 PROCEDURE — 85027 COMPLETE CBC AUTOMATED: CPT

## 2022-11-12 PROCEDURE — 6360000002 HC RX W HCPCS: Performed by: INTERNAL MEDICINE

## 2022-11-12 PROCEDURE — 81001 URINALYSIS AUTO W/SCOPE: CPT

## 2022-11-12 RX ORDER — CLOPIDOGREL BISULFATE 75 MG/1
75 TABLET ORAL DAILY
Status: DISCONTINUED | OUTPATIENT
Start: 2022-11-12 | End: 2022-11-12

## 2022-11-12 RX ORDER — ROSUVASTATIN CALCIUM 10 MG/1
TABLET, COATED ORAL
Status: DISPENSED
Start: 2022-11-12 | End: 2022-11-12

## 2022-11-12 RX ORDER — ONDANSETRON 4 MG/1
4 TABLET, ORALLY DISINTEGRATING ORAL EVERY 8 HOURS PRN
Status: DISCONTINUED | OUTPATIENT
Start: 2022-11-12 | End: 2022-11-16 | Stop reason: HOSPADM

## 2022-11-12 RX ORDER — ACETAMINOPHEN 325 MG/1
650 TABLET ORAL EVERY 6 HOURS PRN
Status: DISCONTINUED | OUTPATIENT
Start: 2022-11-12 | End: 2022-11-16 | Stop reason: HOSPADM

## 2022-11-12 RX ORDER — HEPARIN SODIUM 1000 [USP'U]/ML
60 INJECTION, SOLUTION INTRAVENOUS; SUBCUTANEOUS PRN
Status: DISCONTINUED | OUTPATIENT
Start: 2022-11-12 | End: 2022-11-16 | Stop reason: HOSPADM

## 2022-11-12 RX ORDER — FUROSEMIDE 10 MG/ML
40 INJECTION INTRAMUSCULAR; INTRAVENOUS 2 TIMES DAILY
Status: COMPLETED | OUTPATIENT
Start: 2022-11-12 | End: 2022-11-12

## 2022-11-12 RX ORDER — NITROGLYCERIN 0.4 MG/1
0.4 TABLET SUBLINGUAL EVERY 5 MIN PRN
Status: DISCONTINUED | OUTPATIENT
Start: 2022-11-12 | End: 2022-11-14 | Stop reason: SDUPTHER

## 2022-11-12 RX ORDER — TOPIRAMATE 25 MG/1
TABLET ORAL
Status: DISPENSED
Start: 2022-11-12 | End: 2022-11-12

## 2022-11-12 RX ORDER — SODIUM CHLORIDE 0.9 % (FLUSH) 0.9 %
5-40 SYRINGE (ML) INJECTION PRN
Status: DISCONTINUED | OUTPATIENT
Start: 2022-11-12 | End: 2022-11-16 | Stop reason: HOSPADM

## 2022-11-12 RX ORDER — HEPARIN SODIUM 1000 [USP'U]/ML
60 INJECTION, SOLUTION INTRAVENOUS; SUBCUTANEOUS ONCE
Status: COMPLETED | OUTPATIENT
Start: 2022-11-12 | End: 2022-11-12

## 2022-11-12 RX ORDER — SODIUM CHLORIDE 0.9 % (FLUSH) 0.9 %
5-40 SYRINGE (ML) INJECTION EVERY 12 HOURS SCHEDULED
Status: DISCONTINUED | OUTPATIENT
Start: 2022-11-12 | End: 2022-11-16 | Stop reason: HOSPADM

## 2022-11-12 RX ORDER — SODIUM CHLORIDE 9 MG/ML
INJECTION, SOLUTION INTRAVENOUS PRN
Status: DISCONTINUED | OUTPATIENT
Start: 2022-11-12 | End: 2022-11-16 | Stop reason: HOSPADM

## 2022-11-12 RX ORDER — POLYETHYLENE GLYCOL 3350 17 G/17G
17 POWDER, FOR SOLUTION ORAL DAILY PRN
Status: DISCONTINUED | OUTPATIENT
Start: 2022-11-12 | End: 2022-11-16 | Stop reason: HOSPADM

## 2022-11-12 RX ORDER — ALLOPURINOL 100 MG/1
100 TABLET ORAL DAILY
Status: DISCONTINUED | OUTPATIENT
Start: 2022-11-12 | End: 2022-11-16 | Stop reason: HOSPADM

## 2022-11-12 RX ORDER — ASPIRIN 81 MG/1
81 TABLET ORAL DAILY
Status: DISCONTINUED | OUTPATIENT
Start: 2022-11-12 | End: 2022-11-16 | Stop reason: HOSPADM

## 2022-11-12 RX ORDER — ENOXAPARIN SODIUM 100 MG/ML
30 INJECTION SUBCUTANEOUS DAILY
Status: DISCONTINUED | OUTPATIENT
Start: 2022-11-12 | End: 2022-11-12

## 2022-11-12 RX ORDER — ACETAMINOPHEN 650 MG/1
650 SUPPOSITORY RECTAL EVERY 6 HOURS PRN
Status: DISCONTINUED | OUTPATIENT
Start: 2022-11-12 | End: 2022-11-16 | Stop reason: HOSPADM

## 2022-11-12 RX ORDER — TOPIRAMATE 25 MG/1
50 TABLET ORAL 2 TIMES DAILY
Status: DISCONTINUED | OUTPATIENT
Start: 2022-11-12 | End: 2022-11-16 | Stop reason: HOSPADM

## 2022-11-12 RX ORDER — CLOPIDOGREL BISULFATE 75 MG/1
75 TABLET ORAL DAILY
Status: DISCONTINUED | OUTPATIENT
Start: 2022-11-13 | End: 2022-11-16 | Stop reason: HOSPADM

## 2022-11-12 RX ORDER — ALBUTEROL SULFATE 90 UG/1
2 AEROSOL, METERED RESPIRATORY (INHALATION) 4 TIMES DAILY PRN
Status: DISCONTINUED | OUTPATIENT
Start: 2022-11-12 | End: 2022-11-16 | Stop reason: HOSPADM

## 2022-11-12 RX ORDER — ROSUVASTATIN CALCIUM 10 MG/1
5 TABLET, COATED ORAL NIGHTLY
Status: DISCONTINUED | OUTPATIENT
Start: 2022-11-12 | End: 2022-11-16 | Stop reason: HOSPADM

## 2022-11-12 RX ORDER — CARVEDILOL 25 MG/1
25 TABLET ORAL 2 TIMES DAILY WITH MEALS
Status: DISCONTINUED | OUTPATIENT
Start: 2022-11-12 | End: 2022-11-16 | Stop reason: HOSPADM

## 2022-11-12 RX ORDER — HEPARIN SODIUM 10000 [USP'U]/100ML
5-30 INJECTION, SOLUTION INTRAVENOUS CONTINUOUS
Status: DISCONTINUED | OUTPATIENT
Start: 2022-11-12 | End: 2022-11-16

## 2022-11-12 RX ORDER — HYDROCODONE BITARTRATE AND ACETAMINOPHEN 5; 325 MG/1; MG/1
TABLET ORAL
Status: DISPENSED
Start: 2022-11-12 | End: 2022-11-12

## 2022-11-12 RX ORDER — HYDROCODONE BITARTRATE AND ACETAMINOPHEN 5; 325 MG/1; MG/1
1 TABLET ORAL 4 TIMES DAILY PRN
Status: DISCONTINUED | OUTPATIENT
Start: 2022-11-12 | End: 2022-11-16 | Stop reason: HOSPADM

## 2022-11-12 RX ORDER — ONDANSETRON 2 MG/ML
4 INJECTION INTRAMUSCULAR; INTRAVENOUS EVERY 6 HOURS PRN
Status: DISCONTINUED | OUTPATIENT
Start: 2022-11-12 | End: 2022-11-16 | Stop reason: HOSPADM

## 2022-11-12 RX ORDER — HEPARIN SODIUM 1000 [USP'U]/ML
30 INJECTION, SOLUTION INTRAVENOUS; SUBCUTANEOUS PRN
Status: DISCONTINUED | OUTPATIENT
Start: 2022-11-12 | End: 2022-11-16 | Stop reason: HOSPADM

## 2022-11-12 RX ADMIN — FUROSEMIDE 40 MG: 10 INJECTION, SOLUTION INTRAMUSCULAR; INTRAVENOUS at 11:25

## 2022-11-12 RX ADMIN — Medication 10 ML: at 14:20

## 2022-11-12 RX ADMIN — TOPIRAMATE 50 MG: 25 TABLET, FILM COATED ORAL at 21:42

## 2022-11-12 RX ADMIN — Medication 10 ML: at 21:54

## 2022-11-12 RX ADMIN — HEPARIN SODIUM 1500 UNITS: 1000 INJECTION INTRAVENOUS; SUBCUTANEOUS at 21:46

## 2022-11-12 RX ADMIN — ROSUVASTATIN CALCIUM 5 MG: 10 TABLET, FILM COATED ORAL at 21:42

## 2022-11-12 RX ADMIN — ASPIRIN 81 MG: 81 TABLET, COATED ORAL at 11:25

## 2022-11-12 RX ADMIN — HYDROCODONE BITARTRATE AND ACETAMINOPHEN 1 TABLET: 5; 325 TABLET ORAL at 01:04

## 2022-11-12 RX ADMIN — HEPARIN SODIUM 3010 UNITS: 1000 INJECTION INTRAVENOUS; SUBCUTANEOUS at 03:14

## 2022-11-12 RX ADMIN — TOPIRAMATE 50 MG: 25 TABLET, FILM COATED ORAL at 01:04

## 2022-11-12 RX ADMIN — FUROSEMIDE 40 MG: 10 INJECTION, SOLUTION INTRAMUSCULAR; INTRAVENOUS at 17:55

## 2022-11-12 RX ADMIN — Medication 10 ML: at 17:55

## 2022-11-12 RX ADMIN — HEPARIN SODIUM 12 UNITS/KG/HR: 10000 INJECTION, SOLUTION INTRAVENOUS at 03:14

## 2022-11-12 RX ADMIN — LEVOTHYROXINE SODIUM 125 MCG: 0.03 TABLET ORAL at 05:59

## 2022-11-12 RX ADMIN — CARVEDILOL 25 MG: 25 TABLET, FILM COATED ORAL at 11:25

## 2022-11-12 RX ADMIN — TOPIRAMATE 50 MG: 25 TABLET, FILM COATED ORAL at 11:25

## 2022-11-12 RX ADMIN — ALLOPURINOL 100 MG: 100 TABLET ORAL at 11:25

## 2022-11-12 RX ADMIN — ROSUVASTATIN CALCIUM 5 MG: 10 TABLET, FILM COATED ORAL at 01:04

## 2022-11-12 RX ADMIN — POLYETHYLENE GLYCOL 3350 17 G: 17 POWDER, FOR SOLUTION ORAL at 05:59

## 2022-11-12 RX ADMIN — CARVEDILOL 25 MG: 25 TABLET, FILM COATED ORAL at 17:55

## 2022-11-12 RX ADMIN — HEPARIN SODIUM 3010 UNITS: 1000 INJECTION INTRAVENOUS; SUBCUTANEOUS at 14:20

## 2022-11-12 RX ADMIN — SODIUM CHLORIDE, PRESERVATIVE FREE 10 ML: 5 INJECTION INTRAVENOUS at 11:25

## 2022-11-12 ASSESSMENT — PAIN DESCRIPTION - PAIN TYPE: TYPE: CHRONIC PAIN

## 2022-11-12 ASSESSMENT — PAIN DESCRIPTION - FREQUENCY: FREQUENCY: CONTINUOUS

## 2022-11-12 ASSESSMENT — PAIN DESCRIPTION - ONSET: ONSET: ON-GOING

## 2022-11-12 ASSESSMENT — PAIN SCALES - GENERAL
PAINLEVEL_OUTOF10: 0
PAINLEVEL_OUTOF10: 8

## 2022-11-12 ASSESSMENT — PAIN SCALES - WONG BAKER: WONGBAKER_NUMERICALRESPONSE: 0

## 2022-11-12 ASSESSMENT — PAIN DESCRIPTION - LOCATION: LOCATION: BACK

## 2022-11-12 ASSESSMENT — PAIN DESCRIPTION - DESCRIPTORS: DESCRIPTORS: ACHING

## 2022-11-12 ASSESSMENT — PAIN DESCRIPTION - ORIENTATION: ORIENTATION: LOWER

## 2022-11-12 ASSESSMENT — PAIN - FUNCTIONAL ASSESSMENT: PAIN_FUNCTIONAL_ASSESSMENT: PREVENTS OR INTERFERES SOME ACTIVE ACTIVITIES AND ADLS

## 2022-11-12 NOTE — PROGRESS NOTES
Hospitalist Progress Note      PCP: Patricia Messina MD    Date of Admission: 11/11/2022    Chief Complaint: Shortness of breath    Hospital Course: Miesha Alfaro is a 80 y.o. female with history of A. fib on Xarelto, s/p watchman implanted 5/17/2021, TIA, chronic systolic CHF, CAD with recent drug-eluting stent x2 on 10/19/2022, CABG in 87, Hypertension, HLD, and CKD 3. 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 She presented to ER for shortness of breath. Daughter is at bedside and states yesterday she seemed confused. This afternoon she developed shortness of breath. She denies any chest pain or cough. She is afebrile. She denies any lower extremity edema. In the ED troponin was elevated at 0.18 and EKG without acute ST-T changes. She was started on heparin drip and Lasix. Subjective: Patient seen and examined with daughter at bedside. No interval events. Denies any chest pain. Dyspnea improved.       Medications:  Reviewed    Infusion Medications    sodium chloride      heparin (PORCINE) Infusion 16 Units/kg/hr (11/12/22 1415)     Scheduled Medications    allopurinol  100 mg Oral Daily    aspirin EC  81 mg Oral Daily    carvedilol  25 mg Oral BID WC    levothyroxine  125 mcg Oral Daily    rosuvastatin  5 mg Oral Nightly    topiramate  50 mg Oral BID    sodium chloride flush  5-40 mL IntraVENous 2 times per day    furosemide  40 mg IntraVENous BID    [START ON 11/13/2022] clopidogrel  75 mg Oral Daily     PRN Meds: albuterol sulfate HFA, HYDROcodone-acetaminophen, nitroGLYCERIN, sodium chloride flush, sodium chloride, ondansetron **OR** ondansetron, polyethylene glycol, acetaminophen **OR** acetaminophen, perflutren lipid microspheres, heparin (porcine), heparin (porcine), nitroGLYCERIN      Intake/Output Summary (Last 24 hours) at 11/12/2022 1709  Last data filed at 11/12/2022 0610  Gross per 24 hour   Intake --   Output 1000 ml Net -1000 ml       Physical Exam Performed:    BP (!) 109/53   Pulse 74   Temp 97.6 °F (36.4 °C) (Oral)   Resp 18   Ht 5' 3\" (1.6 m)   Wt 110 lb 6.4 oz (50.1 kg)   SpO2 99%   BMI 19.56 kg/m²     General appearance: Elderly and frail, no apparent distress, appears stated age and cooperative. HEENT: Pupils equal, round, and reactive to light. Conjunctivae clear. Neck: Supple, with full range of motion. No jugular venous distention. Trachea midline. Respiratory:  Normal respiratory effort. Decreased air entry at the bases with Rales. Cardiovascular: Regular rate and rhythm with normal S1/S2 without murmurs, rubs or gallops. Abdomen: Soft, non-tender, non-distended with normal bowel sounds. Musculoskeletal: No clubbing, cyanosis or edema bilaterally. Full range of motion without deformity. Skin: Skin color, texture, turgor normal.  No rashes or lesions. Neurologic:  Neurovascularly intact without any focal sensory/motor deficits.  Cranial nerves: II-XII intact, grossly non-focal.  Psychiatric: Alert and oriented, thought content appropriate, normal insight  Capillary Refill: Brisk, 3 seconds, normal   Peripheral Pulses: +2 palpable, equal bilaterally       Labs:   Recent Labs     11/11/22  1741 11/12/22 0110 11/12/22 0433   WBC 20.6* 18.5* 18.0*   HGB 14.7 14.4 13.8   HCT 48.8* 46.1 44.2   * 460* 437     Recent Labs     11/11/22 1855 11/12/22 0433    144   K 4.3 3.5   * 112*   CO2 16* 18*   BUN 37* 40*   CREATININE 1.3* 1.3*   CALCIUM 9.4 9.1     Recent Labs     11/11/22  1855 11/12/22 0433   AST 17 17   ALT 8* 9*   BILITOT 0.6 0.5   ALKPHOS 95 87     Recent Labs     11/12/22  0235   INR 1.27*     Recent Labs     11/12/22  0110 11/12/22  0433 11/12/22  1211   TROPONINI 0.18* 0.17* 0.11*       Urinalysis:      Lab Results   Component Value Date/Time    NITRU Negative 11/12/2022 12:20 AM    WBCUA 4 11/12/2022 12:20 AM    BACTERIA None Seen 11/12/2022 12:20 AM    RBCUA 26 11/12/2022 12:20 AM    BLOODU MODERATE 11/12/2022 12:20 AM    SPECGRAV 1.015 11/12/2022 12:20 AM    GLUCOSEU Negative 11/12/2022 12:20 AM    GLUCOSEU NEGATIVE 12/04/2011 06:40 PM       Radiology:  CT CHEST PULMONARY EMBOLISM W CONTRAST   Preliminary Result   1. Motion limited evaluation with no evidence of acute pulmonary embolism to   the segmental level. 2. Pulmonary edema with small right and trace left pleural effusions. 3. Reflux of contrast into the hepatic veins can be seen in the setting of   right heart failure. 4. Unchanged enlargement of main pulmonary artery which can be seen in the   setting of pulmonary artery hypertension. 5. Chronic findings outlined in body of the report. XR CHEST PORTABLE   Final Result   Severe cardiomegaly, with vascular congestive changes and mild interstitial   pulmonary edema. Assessment/Plan:    Active Hospital Problems    Diagnosis     Acute respiratory failure with hypoxia ([address] [J96.01]      Priority: Medium    NSTEMI (non-ST elevated myocardial infarction) ([address] [I21.4]     Chronic combined systolic (congestive) and diastolic (congestive) heart failure (HCC) [I50.42]     Essential hypertension, benign [I10]        Acute on chronic combined systolic and diastolic HF with hypoxia:   Echo 7/20/2022: LVEF 50 to 60%. Currently requiring 2 L nasal cannula. Continue CHF protocol with IV lasix. Wean off supplemental oxygen as tolerated. Repeat echocardiogram pending. NSTEMI:  Cardiology consulted: Appreciate input. Continue heparin drip, Crestor, Coreg, aspirin and Plavix. Interventional cardiology evaluation for cath on Monday. CKD 3: Stable renal function. Nephrology consulted given need for diuresis. Urinary Retention:  Bladder scan in the ED with ~700 cc of urine. Zavala catheter placed. Urinalysis negative for UTI. Urology consult. Paroxysmal A-fib: s/p watchman. Continue Coreg.      Hyperlipidemia: Continue statin Leukocytosis: Unclear etiology  WBC 20.6K on admission. Now 18 K.  UA negative for UTI. Pro calcitonin 0.29. Remains afebrile and hemodynamically stable. CXR: Severe cardiomegaly with pulmonary vascular congestion/mild interstitial edema and.  CT chest without pneumonia. Monitor CBC off antibiotics for now. Follow-up blood cultures, ESR, CRP, respiratory viral panel. DVT Prophylaxis: On heparin drip  Diet: ADULT DIET; Regular;  Low Sodium (2 gm)  Code Status: Full Code  PT/OT Eval Status: Requested    Dispo -once medically stable    Cooper Bagley MD

## 2022-11-12 NOTE — CONSULTS
[facility]  Advanced CHF/Pulmonary Hypertension   Cardiac Evaluation      Glenroy Mott  YOB: 1940    Requesting PHysician:  TIFFANIE Thomas      Chief Complaint   Patient presents with    Shortness of Breath     From home by [facility]. Had 2 stents placed this week        History of Present Illness:  Marques Brennan is an 79 yo female with a history of CAD with recent drug eluting stent x 2 on 10/19/22, CABG in 87, hypertension, HLD, and CKD. She presented to the ED for shortness of breath. Her daughter came with her to the ED and felt that she was confused. She also admitted to shortness of breath. She denies chest pain or cough. No fever, chills. No lower extremity edema. No nausea or vomiting. No abdominal pain. She has a history of atrial fibrillation on Xarelto, TIA, CKD, CHF. Her last LVEF was 45% on 7/19/19. She had Watchman implanted 5/17/21. 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 She had stent to left main and proximal circumflex on 10/19/22    We are consulted for shortness of breath and elevated troponin. Her troponin was slightly elevated on admission, but increased to 0.18, now 0.11.  she is on a heparin drip. She does not appear to be grossly fluid overloaded. Her BNP is actually lower than it was on 10/21/22. She is receiving IV Lasix. I/O's negative 1000 cc    Labs:  Sodium 144  K 3.5  BUN/cre 40/1. 3  Magnesium 2.2  Troponin 0.04, 0.18, 0.17  BNP 8979 (was 14k on 10/21/22)  WBC 18.0  H/H 13.8/44.2    CXR:  Impression   Severe cardiomegaly, with vascular congestive changes and mild interstitial   pulmonary edema. DONA:  7/20/22:   Summary   Normal left ventricle size, wall thickness, and systolic function with an   estimated ejection fraction of 55-60%. Moderate mitral regurgitation is present. Left atrial size is dilated.    There is a Watchman device seated with a feliciano-device leak measured at 3 mm   on the anterior side. No thrombus seen. Moderate tricuspid regurgitation. Systolic pulmonary artery pressure (SPAP) estimated at 41 mmHg (RA pressure   3 mmHg), consistent with mild pulmonary hypertension.     Meds prior to admission:  Carvedilol 25 bid  Crestor 5 qd  Aspirin 81 qd  Torsemide 20 qd  Plavix 75 qd    Allergies   Allergen Reactions    Aspirin Nausea Only    Diltiazem Anaphylaxis    Diltiazem Hcl Anaphylaxis    Lorazepam Other (See Comments)     hallucinations  hallucinations  hallucinations    Sulfa Antibiotics Rash and Hives    Atorvastatin Other (See Comments)     Muscle pains  Muscle pains  Muscle pains    Dabigatran Nausea Only     And indigestion  And indigestion    Aka Pradaxa  And indigestion  And indigestion  And indigestion    Aka Pradaxa  And indigestion  And indigestion  And indigestion    Aka Pradaxa    Mysoline [Primidone]     Nsaids      Other reaction(s): Unknown    Other Other (See Comments)     Nitroglycerin patches causes severe headaches    Primidone      Other reaction(s): Unknown     Current Facility-Administered Medications   Medication Dose Route Frequency Provider Last Rate Last Admin    albuterol sulfate HFA (PROVENTIL;VENTOLIN;PROAIR) 108 (90 Base) MCG/ACT inhaler 2 puff  2 puff Inhalation 4x Daily PRN Elsie Minick, APRN - CNP        allopurinol (ZYLOPRIM) tablet 100 mg  100 mg Oral Daily Elsie Minick, APRN - CNP   100 mg at 11/12/22 1125    aspirin EC tablet 81 mg  81 mg Oral Daily Elsie Minick, APRN - CNP   81 mg at 11/12/22 1125    carvedilol (COREG) tablet 25 mg  25 mg Oral BID WC Elsie Minick, APRN - CNP   25 mg at 11/12/22 1125    HYDROcodone-acetaminophen (NORCO) 5-325 MG per tablet 1 tablet  1 tablet Oral 4x Daily PRN Elsie Minick, APRN - CNP   1 tablet at 11/12/22 0104    levothyroxine (SYNTHROID) tablet 125 mcg  125 mcg Oral Daily Elsie Minick, APRN - CNP   125 mcg at 11/12/22 0559    rosuvastatin (CRESTOR) tablet 5 mg  5 mg Oral Nightly Skip Ply, APRN - CNP   5 mg at 11/12/22 0104    topiramate (TOPAMAX) tablet 50 mg  50 mg Oral BID Elsie Minick, APRN - CNP   50 mg at 11/12/22 1125    nitroGLYCERIN (NITROSTAT) SL tablet 0.4 mg  0.4 mg SubLINGual Q5 Min PRN Elsie Minick, APRN - CNP        sodium chloride flush 0.9 % injection 5-40 mL  5-40 mL IntraVENous 2 times per day Elsie Minick, APRN - CNP   10 mL at 11/12/22 1125    sodium chloride flush 0.9 % injection 5-40 mL  5-40 mL IntraVENous PRN Elsie Minick, APRN - CNP        0.9 % sodium chloride infusion   IntraVENous PRN Elsie Minick, APRN - CNP        ondansetron (ZOFRAN-ODT) disintegrating tablet 4 mg  4 mg Oral Q8H PRN Elsie Minick, APRN - CNP        Or    ondansetron (ZOFRAN) injection 4 mg  4 mg IntraVENous Q6H PRN Elsie Minick, APRN - CNP        polyethylene glycol (GLYCOLAX) packet 17 g  17 g Oral Daily PRN Elsie Minick, APRN - CNP   17 g at 11/12/22 0559    acetaminophen (TYLENOL) tablet 650 mg  650 mg Oral Q6H PRN Elsie Minick, APRN - CNP        Or    acetaminophen (TYLENOL) suppository 650 mg  650 mg Rectal Q6H PRN Elsie Minick, APRN - CNP        furosemide (LASIX) injection 40 mg  40 mg IntraVENous BID Elsie Minick, APRN - CNP   40 mg at 11/12/22 1125    perflutren lipid microspheres (DEFINITY) injection 1.5 mL  1.5 mL IntraVENous ONCE PRN Elsie Minick, APRN - CNP        heparin (porcine) injection 3,010 Units  60 Units/kg IntraVENous PRN Elsie Minick, APRN - CNP        heparin (porcine) injection 1,500 Units  30 Units/kg IntraVENous PRN Elsie Minick, APRN - CNP        heparin 25,000 units in dextrose 5% 250 mL (premix) infusion  5-30 Units/kg/hr IntraVENous Continuous Elise Minick, APRN - CNP 6 mL/hr at 11/12/22 0314 12 Units/kg/hr at 11/12/22 0314    [START ON 11/13/2022] clopidogrel (PLAVIX) tablet 75 mg  75 mg Oral Daily TIFFANIE Welch - BRIA        nitroGLYCERIN (NITROSTAT) SL tablet 0.4 mg  0.4 mg SubLINGual Q5 Min PRN Joseph Diaz MD Past Medical History:   Diagnosis Date    Allergic rhinitis, cause unspecified     Arthritis     Atrial fibrillation (HCC)     Bronchopneumonia     CAD (coronary artery disease)     stent:  post cataract surgery (CABG)    Cerebral artery occlusion with cerebral infarction [facility])     TIA    Chronic gouty arthropathy     Chronic kidney disease     Congestive heart failure, unspecified     Degeneration of cervical intervertebral disc     Essential and other specified forms of tremor     Gout     HIGH CHOLESTEROL     History of CVA (cerebrovascular accident)     Hx of blood clots     Hypertension     Influenza 12/23/2017    Leakage of Watchman left atrial appendage closure device     Mitral valve stenosis and aortic valve insufficiency     Movement disorder     back problems    Pacemaker     Peptic ulcer, unspecified site, unspecified as acute or chronic, without mention of hemorrhage, perforation, or obstruction     Thyroid disease     Unspecified disorder of kidney and ureter     Unspecified hypothyroidism      Past Surgical History:   Procedure Laterality Date    BACK SURGERY      CARDIAC CATHETERIZATION  7/2012    CARDIAC CATHETERIZATION  08/07/2018    Unsuccesful  of RCA    CARDIAC SURGERY      CABG & Cardiac ablation    CHOLECYSTECTOMY      CORONARY ARTERY BYPASS GRAFT  1987    LIMA- Diag/LAD, SVG- RCA    FEMORAL BYPASS Left 8/22/2019    LEFT FEMORAL TO POPLITEAL BYPASS GRAFT performed by Beni Jaimes MD at [address]    FEMORAL-FEMORAL BYPASS GRAFT N/A 8/22/2019    FEMORAL TO FEMORAL BYPASS performed by Beni Jaimes MD at [address] Left 03/16/2017    Left hip pinning    IR KYPHOPLASTY LUMBAR FIRST LEVEL  5/14/2021    IR KYPHOPLASTY LUMBAR FIRST LEVEL 5/14/2021 [facility] SPECIAL PROCEDURES    JOINT REPLACEMENT      RI NJX AA&/STRD TFRML EPI LUMBAR/SACRAL 1 LEVEL Right 12/3/2018    RIGHT L3 AND L4 LUMBAR TRANSFORAMINAL EPIRUAL STEROID INJECTION WITH FLUOROSCOPY performed [facility] Aurelio Campbell Earnesteen Rubinstein[facility]  [facility]    MARLENY - 3.0 x 28 to the [address]      left     Family History   Problem Relation Age of Onset    Cancer Sister     Heart Disease Sister     Diabetes Brother     Hypertension Brother     Heart Disease Brother     Stroke Maternal Aunt     Heart Disease Maternal Aunt     Diabetes Maternal Uncle     Hypertension Paternal Aunt     Cancer Maternal Grandmother     Cancer Paternal Grandmother     Rheum Arthritis Neg Hx     Lupus Neg Hx      Social History     Socioeconomic History    Marital status:      Spouse name: Laura Taylor    Number of children: 3    Years of education: 12    Highest education level: Not on file   Occupational History    Not on file   Tobacco Use    Smoking status: Former     Packs/day: 1.00     Years: 28.00     Pack years: 28.00     Types: Cigarettes     Quit date: 1987     Years since quittin.8    Smokeless tobacco: Never    Tobacco comments:     H.O.smoking at age 15 / smoked up to 1 p.p.d / quit    Vaping Use    Vaping Use: Never used   Substance and Sexual Activity    Alcohol use: Yes     Comment: social    Drug use: No    Sexual activity: Not Currently   Other Topics Concern    Not on file   Social History Narrative    Not on file     Social Determinants of Health     Financial Resource Strain: Low Risk     Difficulty of Paying Living Expenses: Not hard at all   Food Insecurity: No Food Insecurity    Worried About [address] in the Last Year: Never true    [facility] in the Last Year: Never true   Transportation Needs: No Transportation Needs    Lack of Transportation (Medical): No    Lack of Transportation (Non-Medical): No   Physical Activity: Inactive    Days of Exercise per Week: 0 days    Minutes of Exercise per Session: 0 min   Stress: No Stress Concern Present    Feeling of Stress : Not at all   Social Connections: Socially Isolated    Frequency of Communication with Friends and Family:  Three times a week    Frequency of Social Gatherings with Friends and Family: Three times a week    Attends [employer] Services: Never    Active Member of Clubs or Organizations: No    Attends Club or Organization Meetings: Never    Marital Status:    Intimate Partner Violence: Not on file   Housing Stability: Low Risk     Unable to Pay for Housing in the Last Year: No    Number of Places Lived in the Last Year: 1    Unstable Housing in the Last Year: No       Review of Systems:   Constitutional: there has been no unanticipated weight loss. There's been no change in energy level, sleep pattern, or activity level. Eyes: No visual changes or diplopia. No scleral icterus. ENT: No Headaches, hearing loss or vertigo. No mouth sores or sore throat. Cardiovascular: Reviewed in HPI  Respiratory: No cough or wheezing, no sputum production. No hematemesis. Gastrointestinal: No abdominal pain, appetite loss, blood in stools. No change in bowel or bladder habits. Genitourinary: No dysuria, trouble voiding, or hematuria. Musculoskeletal:  No gait disturbance, weakness or joint complaints. Integumentary: No rash or pruritis. Neurological: No headache, diplopia, change in muscle strength, numbness or tingling. No change in gait, balance, coordination, mood, affect, memory, mentation, behavior. Psychiatric: No anxiety, no depression. Endocrine: No malaise, fatigue or temperature intolerance. No excessive thirst, fluid intake, or urination. No tremor. Hematologic/Lymphatic: No abnormal bruising or bleeding, blood clots or swollen lymph nodes. Allergic/Immunologic: No nasal congestion or hives.     Physical Examination:    Vitals:    11/12/22 0700 11/12/22 0954 11/12/22 1055 11/12/22 1141   BP: (!) 120/58  116/72 128/71   Pulse: 72  92 69   Resp: 22  20    Temp: 97.7 °F (36.5 °C)  97.8 °F (36.6 °C) 97.7 °F (36.5 °C)   TempSrc: Oral  Axillary Axillary   SpO2: 95% 94% (!) 88% 100%   Weight:       Height:         Body mass index is 19.56 kg/m². Wt Readings from Last 3 Encounters:   11/12/22 110 lb 6.4 oz (50.1 kg)   11/07/22 114 lb 6.4 oz (51.9 kg)   10/28/22 116 lb (52.6 kg)     BP Readings from Last 3 Encounters:   11/12/22 128/71   11/07/22 120/70   10/28/22 122/70     Constitutional and General Appearance:   Chronically ill appearing  HEENT:  NC/AT  KRISTINA  No problems with hearing  Skin:  Warm, dry  Respiratory:  Normal excursion and expansion without use of accessory muscles  Resp Auscultation: Normal breath sounds without dullness  Cardiovascular: The apical impulses not displaced  Heart tones are crisp and normal  Cervical veins are not engorged  The carotid upstroke is normal in amplitude and contour without delay or bruit  JVP less than 8 cm H2O  RRR with nl S1 and S2 without m,r,g  Peripheral pulses are symmetrical and full  There is no clubbing, cyanosis of the extremities. No edema  Femoral Arteries: 2+ and equal  Pedal Pulses: 2+ and equal   Neck:  No thyromegaly  Abdomen:  No masses or tenderness  Liver/Spleen: No Abnormalities Noted  Neurological/Psychiatric:  Alert and oriented in all spheres  Moves all extremities well  Exhibits normal gait balance and coordination  No abnormalities of mood, affect, memory, mentation, or behavior are noted    Labs were reviewed including labs from other hospital systems through [address] [place] [address]. Cardiac testing was reviewed including echos, nuclear scans, cardiac catheterization, including from other hospital systems through [address] [place] [address]. Assessment:    1. NSTEMI (non-ST elevated myocardial infarction) ([address]     2. CAD, recent stent in October  3. Chronic combined systolic and diastolic HF  4.   BNP lower than a month ago  5.  hypertension    Plan:   Continue heparin drip for now  Given that her troponin increased, she likely needs to have repeat cardiac catheterization  Give IV Lasix today, but switch back to po tomorrow  Continue carvedilol, plavix, aspirin, lin  Will observe over the weekend, and have interventional cardiology see her on Monday for cardiac cath. The patient was seen for > 70 minutes. I reviewed interval history, physical exam, review of data including labs, imaging, development and implementation of treatment plan and coordination of complex care. More than 50% of the time was devoted to counseling the patient on their diagnoses/treatments, as well as coordination of care with the other care teams    I appreciate the opportunity of cooperating in the care of this patient.     Kristan Murray M.D., [facility]

## 2022-11-12 NOTE — PLAN OF CARE
Problem: Pain  Goal: Verbalizes/displays adequate comfort level or baseline comfort level  Outcome: Progressing  Note: Patient denies any pain at this time time. Will continue to monitor. Problem: Safety - Adult  Goal: Free from fall injury  Outcome: Progressing  Flowsheets (Taken 11/12/2022 0112 by Toño Muñiz RN)  Free From Fall Injury: Instruct family/caregiver on patient safety  Note: Patient remains absent from falls at this time. Remains alert and oriented, in bed with call light and belongings in reach. Non-slip footwear on and 2/4 siderails raised. Bed remains in lowest/locked position at all times with alarm activated. Fall precautions in place. Patient encouraged to use call light to request assistance, v/u.  Will continue to monitor. Problem: Cardiovascular - Adult  Goal: Maintains optimal cardiac output and hemodynamic stability  Outcome: Progressing  Note: Patient VSS at this time. Remains on heparin gtt per orders - see MAR. Will continue to monitor.

## 2022-11-12 NOTE — PROGRESS NOTES
4 Eyes Admission Assessment     I agree as the admission nurse that 2 RN's have performed a thorough Head to Toe Skin Assessment on the patient. ALL assessment sites listed below have been assessed on admission. Areas assessed by both nurses:   [x]   Head, Face, and Ears   [x]   Shoulders, Back, and Chest  [x]   Arms, Elbows, and Hands   [x]   Coccyx, Sacrum, and Ischium  [x]   Legs, Feet, and Heels        Does the Patient have Skin Breakdown? No- No pressure injuries or DTI's noted upon admission. Patient presents with blanchable redness to coccyx, and scattered bruising.          Conor Prevention initiated:  Yes   Wound Care Orders initiated:  No      [facility] nurse consulted for Pressure Injury (Stage 3,4, Unstageable, DTI, NWPT, and Complex wounds) or Conor score 18 or lower:  No      Nurse 1 eSignature: Electronically signed by Carole Rockwell RN on 11/12/22 at 1:37 AM EST    **SHARE this note so that the co-signing nurse is able to place an eSignature**    Nurse 2 eSignature: {Esignature:[id number]}

## 2022-11-12 NOTE — H&P
HOSPITALISTS HISTORY AND PHYSICAL    11/11/2022 10:52 PM    Patient Information:  Kayode Eddy is a 80 y.o. female [id number]  PCP:  Yuni Heard MD (Tel: [phone] )    Chief complaint:    Chief Complaint   Patient presents with    Shortness of Breath     From home by [facility]. Had 2 stents placed this week        History of Present Illness:  Ruperto Lawton is a 80 y.o. female with history of coronary artery disease with recent drug-eluting stent x2 on 10/19/2022, CABG in 87, Hypertension, HLD, and CKD. Presents to ER for shortness of breath. Daughter is at bedside and states yesterday she seemed confused. This afternoon she developed shortness of breath. She denies any chest pain or cough. She is afebrile. She denies any lower extremity edema. History obtained from patient     REVIEW OF SYSTEMS:   Review of Systems   Constitutional:  Positive for activity change. Negative for appetite change and fever. HENT: Negative. Eyes: Negative. Respiratory:  Positive for shortness of breath. Negative for cough. Cardiovascular: Negative. Gastrointestinal: Negative. Endocrine: Negative. Genitourinary: Negative. Musculoskeletal:  Positive for back pain. Skin: Negative. Neurological: Negative. Psychiatric/Behavioral:  Positive for confusion. All other systems reviewed and are negative.     Past Medical History:   has a past medical history of Allergic rhinitis, cause unspecified, Arthritis, Atrial fibrillation ([address] Bronchopneumonia, CAD (coronary artery disease), Cerebral artery occlusion with cerebral infarction [facility]), Chronic gouty arthropathy, Chronic kidney disease, Congestive heart failure, unspecified, Degeneration of cervical intervertebral disc, Essential and other specified forms of tremor, Gout, HIGH CHOLESTEROL, History of CVA (cerebrovascular accident), Hx of blood clots, Hypertension, Influenza, Leakage of Watchman left atrial appendage closure device, Mitral valve stenosis and aortic valve insufficiency, Movement disorder, Pacemaker, Peptic ulcer, unspecified site, unspecified as acute or chronic, without mention of hemorrhage, perforation, or obstruction, Thyroid disease, Unspecified disorder of kidney and ureter, and Unspecified hypothyroidism. Past Surgical History:   has a past surgical history that includes back surgery; Cholecystectomy; Cardiac surgery; Coronary artery bypass graft (1987); shoulder surgery; Cardiac catheterization (7/2012); hip surgery (Left, 03/16/2017); joint replacement; Cardiac catheterization (08/07/2018); Percutaneous Transluminal Coronary Angio (11/04/2014); pr njx aa&/strd tfrml epi lumbar/sacral 1 level (Right, 12/3/2018); Femoral-femoral Bypass Graft (N/A, 8/22/2019); femoral bypass (Left, 8/22/2019); and IR KYPHOPLASTY LUMBAR 1 VERTEBRAL BODY (5/14/2021). Medications:  No current facility-administered medications on file prior to encounter. Current Outpatient Medications on File Prior to Encounter   Medication Sig Dispense Refill    HYDROcodone-acetaminophen (NORCO) 5-325 MG per tablet Take 1 tablet by mouth 4 times daily as needed for Pain for up to 30 days.  120 tablet 0    carvedilol (COREG) 25 MG tablet Take 1 tablet by mouth 2 times daily (with meals) 180 tablet 3    rosuvastatin (CRESTOR) 5 MG tablet Take 1 tablet by mouth nightly 90 tablet 1    levothyroxine (SYNTHROID) 125 MCG tablet Take 1 tablet by mouth Daily 90 tablet 0    aspirin EC 81 MG EC tablet Take 1 tablet by mouth daily 90 tablet 1    topiramate (TOPAMAX) 50 MG tablet TAKE 2 TABLETS TWICE DAILY 360 tablet 0    torsemide (DEMADEX) 20 MG tablet 10 mg on MWF 30 tablet 3    albuterol sulfate HFA (VENTOLIN HFA) 108 (90 Base) MCG/ACT inhaler Inhale 2 puffs into the lungs 4 times daily as needed for Wheezing 18 g 0    vitamin D (ERGOCALCIFEROL) 1.25 MG (42948 UT) CAPS capsule TAKE 1 CAPSULE ONCE A WEEK 12 capsule 1    clopidogrel (PLAVIX) 75 MG tablet Take 1 tablet by mouth in the morning. 30 tablet 3    allopurinol (ZYLOPRIM) 100 MG tablet TAKE 1 TABLET EVERY DAY 90 tablet 1    nitroGLYCERIN (NITROLINGUAL) 0.4 MG/SPRAY 0.4 mg spray Place 1 spray under the tongue every 5 minutes as needed for Chest pain 4.9 g 1    fluticasone (FLONASE) 50 MCG/ACT nasal spray 2 sprays by Each Nostril route daily 16 g 0       Allergies: Allergies   Allergen Reactions    Aspirin Nausea Only    Diltiazem Anaphylaxis    Diltiazem Hcl Anaphylaxis    Lorazepam Other (See Comments)     hallucinations  hallucinations  hallucinations    Sulfa Antibiotics Rash and Hives    Atorvastatin Other (See Comments)     Muscle pains  Muscle pains  Muscle pains    Dabigatran Nausea Only     And indigestion  And indigestion    Aka Pradaxa  And indigestion  And indigestion  And indigestion    Aka Pradaxa  And indigestion  And indigestion  And indigestion    Aka Pradaxa    Mysoline [Primidone]     Nsaids      Other reaction(s): Unknown    Other Other (See Comments)     Nitroglycerin patches causes severe headaches    Primidone      Other reaction(s): Unknown        Social History:  Patient Lives alone   reports that she quit smoking about 35 years ago. Her smoking use included cigarettes. She has a 28.00 pack-year smoking history. She has never used smokeless tobacco. She reports current alcohol use. She reports that she does not use drugs. Family History:  family history includes Cancer in her maternal grandmother, paternal grandmother, and sister; Diabetes in her brother and maternal uncle; Heart Disease in her brother, maternal aunt, and sister; Hypertension in her brother and paternal aunt; Stroke in her maternal aunt. ,     Physical Exam:  BP (!) 152/54   Pulse 74   Temp 98.6 °F (37 °C) (Oral)   Resp 28   SpO2 98%   Physical Exam  Vitals reviewed.    Constitutional:       General: She is not in acute distress. Appearance: She is ill-appearing. Comments: Patient pale, ill appearing, tachypnea    HENT:      Head: Normocephalic. Nose: Nose normal.      Mouth/Throat:      Mouth: Mucous membranes are moist.   Eyes:      Extraocular Movements: Extraocular movements intact. Pupils: Pupils are equal, round, and reactive to light. Cardiovascular:      Rate and Rhythm: Normal rate and regular rhythm. Pulses: Normal pulses. Heart sounds: No murmur heard. Pulmonary:      Effort: Respiratory distress present. Breath sounds: Rales present. Comments: RR 36  Abdominal:      General: Abdomen is flat. Bowel sounds are normal. There is no distension. Palpations: Abdomen is soft. Tenderness: There is no abdominal tenderness. Skin:     Coloration: Skin is pale. Neurological:      General: No focal deficit present. Mental Status: She is alert and oriented to person, place, and time. Cranial Nerves: No cranial nerve deficit. Labs:  CBC:   Lab Results   Component Value Date/Time    WBC 20.6 11/11/2022 05:41 PM    RBC 5.95 11/11/2022 05:41 PM    HGB 14.7 11/11/2022 05:41 PM    HCT 48.8 11/11/2022 05:41 PM    MCV 82.0 11/11/2022 05:41 PM    MCH 24.6 11/11/2022 05:41 PM    MCHC 30.0 11/11/2022 05:41 PM    RDW 20.0 11/11/2022 05:41 PM     11/11/2022 05:41 PM    MPV 9.8 11/11/2022 05:41 PM     BMP:    Lab Results   Component Value Date/Time     11/11/2022 06:55 PM    K 4.3 11/11/2022 06:55 PM     11/11/2022 06:55 PM    CO2 16 11/11/2022 06:55 PM    BUN 37 11/11/2022 06:55 PM    CREATININE 1.3 11/11/2022 06:55 PM    CALCIUM 9.4 11/11/2022 06:55 PM    GFRAA 47 09/16/2022 05:35 AM    GFRAA 38 04/02/2013 09:47 AM    LABGLOM 41 11/11/2022 06:55 PM    GLUCOSE 143 11/11/2022 06:55 PM    GLUCOSE 80 06/08/2022 02:37 PM     CT CHEST PULMONARY EMBOLISM W CONTRAST   Preliminary Result   1.  Motion limited evaluation with no evidence of acute pulmonary embolism to   the segmental level. 2. Pulmonary edema with small right and trace left pleural effusions. 3. Reflux of contrast into the hepatic veins can be seen in the setting of   right heart failure. 4. Unchanged enlargement of main pulmonary artery which can be seen in the   setting of pulmonary artery hypertension. 5. Chronic findings outlined in body of the report. XR CHEST PORTABLE   Final Result   Severe cardiomegaly, with vascular congestive changes and mild interstitial   pulmonary edema. Chest Xray:   EKG:    I visualized CXR images and EKG strips      Problem List  Active Problems:    * No active hospital problems. *  Resolved Problems:    * No resolved hospital problems. *        Assessment/Plan:   Acute Respiratory Failure secondary to Pulmonary Edema  Admit inpatient  IV lasix  Stict I and O  NPO  Requiring 2L NC, RR 36, hopefully with lasix will start to see improvement in Respiratory status. Patient is full code  7/20/22 EF 55  to 60%  Repeat Echo in am     2. NSTEMI  ST depression in Leads V5 V6. Cardiology consulted by ER  Trend troponin if continues to rise will start Heparin IV    3. Urinary Retention   Bills inserted   Unable to void in + on bladder scan    4. Paroxysmal A-fib  Watchman     5. Hyperlipidemia  Continue statin     6. Leukocytosis  WBC 20.6  UA pending  Afebrile  Unclear etiology  Pro calcitonin pending     DVT prophylaxis Lovenox   Code status Full   Diet NPO  IV access peripheral   Bills Catheter bills placed in ER    Admit as inpatient. I anticipate hospitalization spanning more than two midnights for investigation and treatment of the above medically necessary diagnoses. Please note that some part of this chart was generated using Dragon dictation software. Although every effort was made to ensure the accuracy of this automated transcription, some errors in transcription may have occurred inadvertently.  If you may need any clarification, please do not hesitate to contact me through [facility].        TIFFANIE Wu - CNP    11/11/2022 10:52 PM

## 2022-11-12 NOTE — CONSULTS
MD Bee Velasquez MD Vela Borer, MD                  Office: [phone]                      Fax: [phone]             [facility]                   NEPHROLOGY INITIAL CONSULT NOTE:     PATIENT NAME: Mauro Campos  : 1940  MRN: [id number]  REASON FOR CONSULT: For evaluation and management of CKD, ARU, fluid overload. (My recommendations will be communicated by way of shared medical record.)      RECOMMENDATIONS:   - Lasix 40 iv BID  - fup w/ cardiology    - check ECHO  - bills insertion needed for ARU  - recheck UA in 48hrs, to fup on hematuria on UA  - no need for dialysis,  at higher risk for decompensation, needing closer monitoring. D/C plan from renal stand point: ~2-3 days- fup fluid overload  Continue to fup w/ in outpt     D/W pt's daughter too     IMPRESSION:       Admitted on:  2022  5:07 PM   For:  NSTEMI (non-ST elevated myocardial infarction) ([facility] [address] [I21.4]  Acute respiratory failure with hypoxia ([address] [J96.01]      H/o GIANA - not now  Proteinuric CKD-3B    - was on torsemide + Aldactone      - BL CKD - stage 3B ,   - Scr ~ 1.2-1.3    - eGFR BL: 30s  - at risk for CI-GIANA , as s/p CTA       Urinary retention   - >700 cc in ER - needing bills insertion      Associated problems:   - Volume status: hyper-volemic  Acute on chronic- dheart failure-  CXR - Cardiomegaly with mild pulmonary venous congestion. Weight in past around 116-119 pounds,   Lowest 113 lbs  Now - 116 lbs in office   At home - ~116 lbs  Now inpatient: 110 lbs     : HTN : no need for tight control   : Na: WNL    - Azotemia: pre-renal  - Electrolytes: K: WNL  Mag WNL  - Acid-Base: acidosis - non AGMA   - Anemia: no         Other major problems: Management per primary and other consulting teams.          Hospital Problems             Last Modified POA    * (Principal) Acute respiratory failure with hypoxia ([facility] [address] 2022 Yes         : other supportive care :   - Check daily renal function panel with electrolytes-phosphorus  - Strict monitoring of I/Os, daily weight  - non-Renal feeds/diet  - Current medications reviewed. Please refer to the orders. High Complexity. Multiple complex problems. Discussed with patient and treatment team-    Time spent > 30 (~35) minutes. Thank you for allowing me to participate in this patient's care. Please do not hesitate to contact me anytime. We will follow along with you. Christine Alvarado MD,  [facility] [address] [place]: [phone] or [employer]  Fax: [phone]        =======================================================================================   =======================================================================================      CHIEF COMPLAINT:   Chief Complaint   Patient presents with    Shortness of Breath     From home by [facility].   Had 2 stents placed this week     History Obtained From:  patient, family member - daughter  + treatment team + Electronic Medical Records    HPI: Ms. Daryle Miller is a 80 y.o. female with significant past medical history as below:   Past Medical History:   Diagnosis Date    Allergic rhinitis, cause unspecified     Arthritis     Atrial fibrillation ([address]     Bronchopneumonia     CAD (coronary artery disease)     stent:  post cataract surgery (CABG)    Cerebral artery occlusion with cerebral infarction [facility])     TIA    Chronic gouty arthropathy     Chronic kidney disease     Congestive heart failure, unspecified     Degeneration of cervical intervertebral disc     Essential and other specified forms of tremor     Gout     HIGH CHOLESTEROL     History of CVA (cerebrovascular accident)     Hx of blood clots     Hypertension     Influenza 12/23/2017    Leakage of Watchman left atrial appendage closure device     Mitral valve stenosis and aortic valve insufficiency     Movement disorder     back problems    Pacemaker     Peptic ulcer, unspecified site, unspecified as acute or chronic, without mention of hemorrhage, perforation, or obstruction     Thyroid disease     Unspecified disorder of kidney and ureter     Unspecified hypothyroidism       Presents with Shortness of Breath (From home by [facility]. Had 2 stents placed this week)    Admitted with NSTEMI (non-ST elevated myocardial infarction) ([facility] [address] [I21.4]  Acute respiratory failure with hypoxia ([facility] [address] [J96.01]   Found to have h/o CKD and fluid overload  , so we are called for that. No current active complaints. Patient denied chest pain / dizziness/lightheadedness/syncope/   Mild SOB   Chronic leg edema. *  Regarding: CKD  Duration: chronic  Location: kidneys  Severity: Severe   Timing: continous  Context (ex: related to condition):  , refer to my assessment / impression. Modifying factors (ex: medications, interventions):  , refer to my plan / recommendation. Associated signs & symptoms (ex: edema, SOB): As mentioned above in CC and HPI      Past medical, Surgical, Social, Family medical history reviewed by me.      PAST MEDICAL HISTORY:   Past Medical History:   Diagnosis Date    Allergic rhinitis, cause unspecified     Arthritis     Atrial fibrillation (HCC)     Bronchopneumonia     CAD (coronary artery disease)     stent:  post cataract surgery (CABG)    Cerebral artery occlusion with cerebral infarction [facility])     TIA    Chronic gouty arthropathy     Chronic kidney disease     Congestive heart failure, unspecified     Degeneration of cervical intervertebral disc     Essential and other specified forms of tremor     Gout     HIGH CHOLESTEROL     History of CVA (cerebrovascular accident)     Hx of blood clots     Hypertension     Influenza 12/23/2017    Leakage of Watchman left atrial appendage closure device     Mitral valve stenosis and aortic valve insufficiency     Movement disorder     back problems    Pacemaker     Peptic ulcer, unspecified site, unspecified as acute or chronic, without mention of hemorrhage, perforation, or obstruction     Thyroid disease     Unspecified disorder of kidney and ureter     Unspecified hypothyroidism      PAST SURGICAL HISTORY:   Past Surgical History:   Procedure Laterality Date    BACK SURGERY      CARDIAC CATHETERIZATION  2012    CARDIAC CATHETERIZATION  2018    Unsuccesful  of RCA    CARDIAC SURGERY      CABG & Cardiac ablation    CHOLECYSTECTOMY      CORONARY ARTERY BYPASS GRAFT  1987    LIMA- Diag/LAD, SVG- RCA    FEMORAL BYPASS Left 2019    LEFT FEMORAL TO POPLITEAL BYPASS GRAFT performed by Holly Frye MD at [address] [place] [address] GRAFT N/A 2019    FEMORAL TO FEMORAL BYPASS performed by Holly Frye MD at [facility] Left 2017    Left hip pinning    IR KYPHOPLASTY LUMBAR FIRST LEVEL  2021    IR KYPHOPLASTY LUMBAR FIRST LEVEL 2021 [facility] SPECIAL PROCEDURES    JOINT REPLACEMENT      MS NJX AA&/STRD TFRML EPI LUMBAR/SACRAL 1 LEVEL Right 12/3/2018    RIGHT L3 AND L4 LUMBAR TRANSFORAMINAL EPIRUAL STEROID INJECTION WITH FLUOROSCOPY performed by Guzman Jeffries MD at [facility]    PTCA  2014    MARLENY - 3.0 x 28 to the Ist Diag    SHOULDER SURGERY      left     FAMILY HISTORY:   Family History   Problem Relation Age of Onset    Cancer Sister     Heart Disease Sister     Diabetes Brother     Hypertension Brother     Heart Disease Brother     Stroke Maternal Aunt     Heart Disease Maternal Aunt     Diabetes Maternal Uncle     Hypertension Paternal Aunt     Cancer Maternal Grandmother     Cancer Paternal Grandmother     Rheum Arthritis Neg Hx     Lupus Neg Hx      SOCIAL HISTORY:   Social History     Socioeconomic History    Marital status:       Spouse name: Josselyn Chacon    Number of children: 3    Years of education: 12    Highest education level: None   Tobacco Use    Smoking status: Former     Packs/day: 1.00     Years: 28.00     Pack years: 28.00     Types: Cigarettes     Quit date: 1987     Years since quittin.8 Smokeless tobacco: Never    Tobacco comments:     H.O.smoking at age 15 / smoked up to 1 p.p.d / quit 1987   Vaping Use    Vaping Use: Never used   Substance and Sexual Activity    Alcohol use: Yes     Comment: social    Drug use: No    Sexual activity: Not Currently     Social Determinants of Health     Financial Resource Strain: Low Risk     Difficulty of Paying Living Expenses: Not hard at all   Food Insecurity: No Food Insecurity    Worried About Running Out of Food in the Last Year: Never true    Ran Out of Food in the Last Year: Never true   Transportation Needs: No Transportation Needs    Lack of Transportation (Medical): No    Lack of Transportation (Non-Medical): No   Physical Activity: Inactive    Days of Exercise per Week: 0 days    Minutes of Exercise per Session: 0 min   Stress: No Stress Concern Present    Feeling of Stress : Not at all   Social Connections: Socially Isolated    Frequency of Communication with Friends and Family: Three times a week    Frequency of Social Gatherings with Friends and Family: Three times a week    Attends Yazidism Services: Never    Active Member of Clubs or Organizations: No    Attends Club or Organization Meetings: Never    Marital Status:    Housing Stability: Low Risk     Unable to Pay for Housing in the Last Year: No    Number of Places Lived in the Last Year: 1    Unstable Housing in the Last Year: No         MEDICATIONS: reviewed by me. Medications Prior to Admission:  No current facility-administered medications on file prior to encounter. Current Outpatient Medications on File Prior to Encounter   Medication Sig Dispense Refill    HYDROcodone-acetaminophen (NORCO) 5-325 MG per tablet Take 1 tablet by mouth 4 times daily as needed for Pain for up to 30 days.  120 tablet 0    carvedilol (COREG) 25 MG tablet Take 1 tablet by mouth 2 times daily (with meals) 180 tablet 3    rosuvastatin (CRESTOR) 5 MG tablet Take 1 tablet by mouth nightly 90 tablet 1 levothyroxine (SYNTHROID) 125 MCG tablet Take 1 tablet by mouth Daily 90 tablet 0    aspirin EC 81 MG EC tablet Take 1 tablet by mouth daily 90 tablet 1    topiramate (TOPAMAX) 50 MG tablet TAKE 2 TABLETS TWICE DAILY 360 tablet 0    torsemide (DEMADEX) 20 MG tablet 10 mg on MWF 30 tablet 3    albuterol sulfate HFA (VENTOLIN HFA) 108 (90 Base) MCG/ACT inhaler Inhale 2 puffs into the lungs 4 times daily as needed for Wheezing 18 g 0    vitamin D (ERGOCALCIFEROL) 1.25 MG (87582 UT) CAPS capsule TAKE 1 CAPSULE ONCE A WEEK 12 capsule 1    clopidogrel (PLAVIX) 75 MG tablet Take 1 tablet by mouth in the morning.  30 tablet 3    allopurinol (ZYLOPRIM) 100 MG tablet TAKE 1 TABLET EVERY DAY 90 tablet 1    nitroGLYCERIN (NITROLINGUAL) 0.4 MG/SPRAY 0.4 mg spray Place 1 spray under the tongue every 5 minutes as needed for Chest pain 4.9 g 1    fluticasone (FLONASE) 50 MCG/ACT nasal spray 2 sprays by Each Nostril route daily 16 g 0         Current Facility-Administered Medications:     albuterol sulfate HFA (PROVENTIL;VENTOLIN;PROAIR) 108 (90 Base) MCG/ACT inhaler 2 puff, 2 puff, Inhalation, 4x Daily PRN, Elsie Gannon APRN - CNP    allopurinol (ZYLOPRIM) tablet 100 mg, 100 mg, Oral, Daily, Elsie Gannon, APRN - CNP    aspirin EC tablet 81 mg, 81 mg, Oral, Daily, Elsie Gannon, APRN - CNP    carvedilol (COREG) tablet 25 mg, 25 mg, Oral, BID WC, Elsie Gannon, APRN - CNP    HYDROcodone-acetaminophen (NORCO) 5-325 MG per tablet 1 tablet, 1 tablet, Oral, 4x Daily PRN, TIFFANIE Welch - CNP, 1 tablet at 11/12/22 0104    levothyroxine (SYNTHROID) tablet 125 mcg, 125 mcg, Oral, Daily, Elsie Gannon, APRN - CNP, 125 mcg at 11/12/22 0559    rosuvastatin (CRESTOR) tablet 5 mg, 5 mg, Oral, Nightly, Elsie Gannon APRN - CNP, 5 mg at 11/12/22 0104    topiramate (TOPAMAX) tablet 50 mg, 50 mg, Oral, BID, TIFFANIE Welch - CNP, 50 mg at 11/12/22 0104    nitroGLYCERIN (NITROSTAT) SL tablet 0.4 mg, 0.4 mg, SubLINGual, Q5 Min PRN, Fly Houston APRN - CNP    sodium chloride flush 0.9 % injection 5-40 mL, 5-40 mL, IntraVENous, 2 times per day, Elsie Minick, APRN - CNP    sodium chloride flush 0.9 % injection 5-40 mL, 5-40 mL, IntraVENous, PRN, Elsie Minick, APRN - CNP    0.9 % sodium chloride infusion, , IntraVENous, PRN, Elsie Minick, APRN - CNP    ondansetron (ZOFRAN-ODT) disintegrating tablet 4 mg, 4 mg, Oral, Q8H PRN **OR** ondansetron (ZOFRAN) injection 4 mg, 4 mg, IntraVENous, Q6H PRN, Elsie Jagdeep, APRN - CNP    polyethylene glycol (GLYCOLAX) packet 17 g, 17 g, Oral, Daily PRN, Elsie Minick, APRN - CNP, 17 g at 11/12/22 0559    acetaminophen (TYLENOL) tablet 650 mg, 650 mg, Oral, Q6H PRN **OR** acetaminophen (TYLENOL) suppository 650 mg, 650 mg, Rectal, Q6H PRN, Elsie Yanetck, APRN - CNP    furosemide (LASIX) injection 40 mg, 40 mg, IntraVENous, BID, Elsie Yanetck, APRN - CNP    perflutren lipid microspheres (DEFINITY) injection 1.5 mL, 1.5 mL, IntraVENous, ONCE PRN, Elsie Yanetck, APRN - CNP    heparin (porcine) injection 3,010 Units, 60 Units/kg, IntraVENous, PRN, Elsie Minick, APRN - CNP    heparin (porcine) injection 1,500 Units, 30 Units/kg, IntraVENous, PRN, Elsie Minick, APRN - CNP    heparin 25,000 units in dextrose 5% 250 mL (premix) infusion, 5-30 Units/kg/hr, IntraVENous, Continuous, Elsie Jagdeep, APRN - CNP, Last Rate: 6 mL/hr at 11/12/22 0314, 12 Units/kg/hr at 11/12/22 0314    [START ON 11/13/2022] clopidogrel (PLAVIX) tablet 75 mg, 75 mg, Oral, Daily, Elsie Jagdeep, APRN - CNP    nitroGLYCERIN (NITROSTAT) SL tablet 0.4 mg, 0.4 mg, SubLINGual, Q5 Min PRN, Rock Golden MD      Allergies reviewed by me: Aspirin, Diltiazem, Diltiazem hcl, Lorazepam, Sulfa antibiotics, Atorvastatin, Dabigatran, Mysoline [primidone], Nsaids, Other, and Primidone    REVIEW OF SYSTEMS:  As mentioned in chief complaint and HPI , Subjective   All other 10-point review of systems: negative. =======================================================================================     PHYSICAL EXAM:  Recent vital signs and recent I/Os reviewed by me. Wt Readings from Last 3 Encounters:   11/12/22 110 lb 6.4 oz (50.1 kg)   11/07/22 114 lb 6.4 oz (51.9 kg)   10/28/22 116 lb (52.6 kg)     BP Readings from Last 3 Encounters:   11/12/22 (!) 120/58   11/07/22 120/70   10/28/22 122/70     Patient Vitals for the past 24 hrs:   BP Temp Temp src Pulse Resp SpO2 Height Weight   11/12/22 0700 (!) 120/58 97.7 °F (36.5 °C) Oral 72 22 95 % -- --   11/12/22 0420 115/65 97.3 °F (36.3 °C) Axillary 76 16 97 % -- --   11/12/22 0026 -- -- -- -- 20 -- -- --   11/12/22 0007 (!) 149/87 97.9 °F (36.6 °C) Oral 75 28 98 % 5' 3\" (1.6 m) 110 lb 6.4 oz (50.1 kg)   11/11/22 2300 (!) 140/61 -- -- 73 28 -- -- --   11/11/22 2230 (!) 131/58 -- -- 77 (!) 32 97 % -- --   11/11/22 2045 (!) 152/54 -- -- 74 28 98 % -- --   11/11/22 2015 (!) 140/60 -- -- 82 22 -- -- --   11/11/22 1945 (!) 144/58 -- -- 82 24 95 % -- --   11/11/22 1930 (!) 147/54 -- -- 80 25 -- -- --   11/11/22 1916 (!) 151/58 -- -- 93 (!) 35 -- -- --   11/11/22 1900 (!) 144/65 -- -- 87 27 96 % -- --   11/11/22 1825 90/69 -- -- 84 18 96 % -- --   11/11/22 1815 122/87 -- -- 78 25 96 % -- --   11/11/22 1805 131/82 -- -- 77 20 99 % -- --   11/11/22 1745 (!) 155/66 -- -- 77 23 98 % -- --   11/11/22 1735 (!) 151/65 -- -- 87 (!) 36 97 % -- --   11/11/22 1713 (!) 175/87 98.6 °F (37 °C) Oral 97 22 97 % -- --       Intake/Output Summary (Last 24 hours) at 11/12/2022 0910  Last data filed at 11/12/2022 0610  Gross per 24 hour   Intake --   Output 1000 ml   Net -1000 ml           Physical Exam  Vitals reviewed. Constitutional:       General: She is not in acute distress. Appearance: Normal appearance. She is ill-appearing. HENT:      Head: Normocephalic and atraumatic.       Right Ear: External ear normal.      Left Ear: External ear normal.      Nose: Nose normal. Mouth/Throat:      Mouth: Mucous membranes are moist. Mucous membranes are not dry. Eyes:      General: No scleral icterus. Conjunctiva/sclera: Conjunctivae normal.   Neck:      Vascular: No JVD. Cardiovascular:      Rate and Rhythm: Normal rate and regular rhythm. Heart sounds: S1 normal and S2 normal.   Pulmonary:      Effort: Respiratory distress (mild) present. Breath sounds: Rhonchi present. Abdominal:      General: Bowel sounds are normal. There is distension. Tenderness: There is no abdominal tenderness. Musculoskeletal:         General: Swelling present. No deformity. Cervical back: Normal range of motion and neck supple. Skin:     General: Skin is dry. Coloration: Skin is not jaundiced. Neurological:      Mental Status: She is alert and oriented to person, place, and time. Mental status is at baseline. Psychiatric:         Mood and Affect: Mood normal.         Behavior: Behavior normal.        =======================================================================================     DATA:  Diagnostic tests reviewed by me for today's visit:   (AS NEEDED FOR MY EVALUATION AND MANAGEMENT). Recent Labs     11/11/22  1741 11/12/22  0110 11/12/22  0433   WBC 20.6* 18.5* 18.0*   HCT 48.8* 46.1 44.2   * 460* 437     Iron Saturation:  No components found for: PERCENTFE  FERRITIN:  No results found for: FERRITIN  IRON:    Lab Results   Component Value Date/Time    IRON 54 11/09/2020 11:05 AM     TIBC:    Lab Results   Component Value Date/Time    TIBC 216 11/09/2020 11:05 AM       Recent Labs     11/11/22  1855 11/12/22  0433    144   K 4.3 3.5   * 112*   CO2 16* 18*   BUN 37* 40*   CREATININE 1.3* 1.3*     Recent Labs     11/11/22  1855 11/12/22  0433   CALCIUM 9.4 9.1   MG  --  2.20     No results for input(s): PH, PCO2, PO2 in the last 72 hours.     Invalid input(s): V1PHBTDEDFXI, INSPIREDO2    ABG:  No results found for: PH, PCO2, PO2, HCO3, BE, THGB, TCO2, O2SAT  VBG:    Lab Results   Component Value Date/Time    PHVEN 7.303 09/15/2022 11:04 AM    ZYG5ERS 38.8 09/15/2022 11:04 AM    BEVEN -6.7 09/15/2022 11:04 AM    H0ZJOTQG 97 09/15/2022 11:04 AM       LDH:  No results found for: LDH  Uric Acid:    Lab Results   Component Value Date/Time    LABURIC 4.8 05/28/2019 12:31 PM       PT/INR:    Lab Results   Component Value Date/Time    PROTIME 15.9 11/12/2022 02:35 AM    INR 1.27 11/12/2022 02:35 AM     Warfarin PT/INR:  No components found for: PTPATWAR, PTINRWAR  PTT:    Lab Results   Component Value Date/Time    APTT 42.5 01/22/2022 02:16 PM   [APTT}  Last 3 Troponin:    Lab Results   Component Value Date/Time    TROPONINI 0.17 11/12/2022 04:33 AM    TROPONINI 0.18 11/12/2022 01:10 AM    TROPONINI 0.04 11/11/2022 06:55 PM       U/A:    Lab Results   Component Value Date/Time    COLORU Yellow 11/12/2022 12:20 AM    PROTEINU Negative 11/12/2022 12:20 AM    PHUR 5.0 11/12/2022 12:20 AM    WBCUA 4 11/12/2022 12:20 AM    RBCUA 26 11/12/2022 12:20 AM    YEAST neg 03/09/2021 02:00 PM    BACTERIA None Seen 11/12/2022 12:20 AM    CLARITYU Clear 11/12/2022 12:20 AM    SPECGRAV 1.015 11/12/2022 12:20 AM    LEUKOCYTESUR SMALL 11/12/2022 12:20 AM    UROBILINOGEN 0.2 11/12/2022 12:20 AM    BILIRUBINUR Negative 11/12/2022 12:20 AM    BILIRUBINUR NEGATIVE 12/04/2011 06:40 PM    BLOODU MODERATE 11/12/2022 12:20 AM    GLUCOSEU Negative 11/12/2022 12:20 AM    GLUCOSEU NEGATIVE 12/04/2011 06:40 PM     Microalbumen/Creatinine ratio:  No components found for: RUCREAT  24 Hour Urine for Protein:  No components found for: RAWUPRO, UHRS3, CCHD71SI, UTV3  24 Hour Urine for Creatinine Clearance:  No components found for: CREAT4, UHRS10, UTV10  Urine Toxicology:  No components found for: IAMMENTA, IBARBIT, IBENZO, ICOCAINE, IMARTHC, IOPIATES, IPHENCYC    HgBA1c:    Lab Results   Component Value Date/Time    LABA1C 5.5 01/23/2022 04:22 AM     RPR:  No results found for: RPR  HIV:  No Medical/Surgical History: Shortness of Breath (From home by [facility]. Had 2 stents placed this week) FINDINGS: Pulmonary Arteries: The pulmonary arteries are adequately opacified for evaluation. Evaluation is limited by respiratory motion. There is no evidence of acute pulmonary embolus to the segmental level. Stable enlargement of the main pulmonary artery which can be seen in the setting of pulmonary artery hypertension. Mediastinum: Diffuse ectasia of the ascending aorta measuring up to 4.2 cm may be mildly increased since the comparison study when it measured up to 3.8 cm. Unchanged cardiomegaly with an implanted cardiac device in place. Coronary artery disease. The patient is status post median sternotomy. Moderate calcified atherosclerotic plaque. A left atrial appendage Watchman device is in place. Mildly prominent right hilar lymph nodes are not significantly changed. No lymphadenopathy by size criteria. Lungs/pleura: Small right and trace left pleural effusions. Left pleural calcifications which could indicate asbestos related pleural disease. Areas of bilateral septal thickening and perihilar ground-glass attenuation, more prominent on the right and most likely reflective of pulmonary edema. No pneumothorax. Scattered calcified granulomas. Within the limitations of respiratory motion, there are no suspicious pulmonary nodules. Upper Abdomen: Limited images of the upper abdomen demonstrate reflux of contrast into the hepatic veins which can be seen in the setting of right heart failure. No acute upper abdominal findings. Soft Tissues/Bones: No acute bone or soft tissue abnormality. 1. Motion limited evaluation with no evidence of acute pulmonary embolism to the segmental level. 2. Pulmonary edema with small right and trace left pleural effusions. 3. Reflux of contrast into the hepatic veins can be seen in the setting of right heart failure.  4. Unchanged enlargement of main pulmonary artery which can be seen in the setting of pulmonary artery hypertension. 5. Chronic findings outlined in body of the report. This report was transcribed using voice recognition software, mainly. So please excuse brevity and/or typos. Every effort was made to ensure accuracy, however, inadvertent computerized transcription errors may be present. Please contact us, if any questions or clarifications are needed.

## 2022-11-12 NOTE — PROGRESS NOTES
Telephone order with read back received to insert bills catheter per PHIL Gannon NP due to acute urinary retention. Sixteen Syriac bills inserted utilizing sterile technique with 700 ml's clear, yellow urine returned. Altagracia-care and hand hygiene completed pre and post catheterization. Patient tolerated procedure well.

## 2022-11-13 LAB
A/G RATIO: 1.3 (ref 1.1–2.2)
ALBUMIN SERPL-MCNC: 3.5 G/DL (ref 3.4–5)
ALP BLD-CCNC: 75 U/L (ref 40–129)
ALT SERPL-CCNC: 8 U/L (ref 10–40)
ANION GAP SERPL CALCULATED.3IONS-SCNC: 12 MMOL/L (ref 3–16)
ANTI-XA UNFRAC HEPARIN: 0.25 IU/ML (ref 0.3–0.7)
ANTI-XA UNFRAC HEPARIN: 0.31 IU/ML (ref 0.3–0.7)
ANTI-XA UNFRAC HEPARIN: 0.37 IU/ML (ref 0.3–0.7)
AST SERPL-CCNC: 17 U/L (ref 15–37)
BASOPHILS ABSOLUTE: 0.2 K/UL (ref 0–0.2)
BASOPHILS RELATIVE PERCENT: 1.6 %
BILIRUB SERPL-MCNC: 0.3 MG/DL (ref 0–1)
BUN BLDV-MCNC: 48 MG/DL (ref 7–20)
C DIFF TOXIN/ANTIGEN: NORMAL
CALCIUM SERPL-MCNC: 8.8 MG/DL (ref 8.3–10.6)
CHLORIDE BLD-SCNC: 111 MMOL/L (ref 99–110)
CO2: 19 MMOL/L (ref 21–32)
CREAT SERPL-MCNC: 1.6 MG/DL (ref 0.6–1.2)
EOSINOPHILS ABSOLUTE: 0.3 K/UL (ref 0–0.6)
EOSINOPHILS RELATIVE PERCENT: 2.7 %
GFR SERPL CREATININE-BSD FRML MDRD: 32 ML/MIN/{1.73_M2}
GLUCOSE BLD-MCNC: 85 MG/DL (ref 70–99)
HCT VFR BLD CALC: 40.1 % (ref 36–48)
HEMOGLOBIN: 12.7 G/DL (ref 12–16)
LYMPHOCYTES ABSOLUTE: 1.2 K/UL (ref 1–5.1)
LYMPHOCYTES RELATIVE PERCENT: 11.1 %
MAGNESIUM: 2.3 MG/DL (ref 1.8–2.4)
MCH RBC QN AUTO: 25.4 PG (ref 26–34)
MCHC RBC AUTO-ENTMCNC: 31.7 G/DL (ref 31–36)
MCV RBC AUTO: 80 FL (ref 80–100)
MONOCYTES ABSOLUTE: 0.6 K/UL (ref 0–1.3)
MONOCYTES RELATIVE PERCENT: 6.2 %
NEUTROPHILS ABSOLUTE: 8.3 K/UL (ref 1.7–7.7)
NEUTROPHILS RELATIVE PERCENT: 78.4 %
PDW BLD-RTO: 19.8 % (ref 12.4–15.4)
PHOSPHORUS: 2.8 MG/DL (ref 2.5–4.9)
PLATELET # BLD: 335 K/UL (ref 135–450)
PMV BLD AUTO: 9.3 FL (ref 5–10.5)
POTASSIUM SERPL-SCNC: 3.8 MMOL/L (ref 3.5–5.1)
RBC # BLD: 5 M/UL (ref 4–5.2)
REASON FOR REJECTION: NORMAL
REJECTED TEST: NORMAL
REPORT: NORMAL
RESPIRATORY PANEL PCR: NORMAL
SODIUM BLD-SCNC: 142 MMOL/L (ref 136–145)
TOTAL PROTEIN: 6.1 G/DL (ref 6.4–8.2)
TROPONIN: 0.09 NG/ML
WBC # BLD: 10.5 K/UL (ref 4–11)

## 2022-11-13 PROCEDURE — 6370000000 HC RX 637 (ALT 250 FOR IP): Performed by: NURSE PRACTITIONER

## 2022-11-13 PROCEDURE — 87324 CLOSTRIDIUM AG IA: CPT

## 2022-11-13 PROCEDURE — 85025 COMPLETE CBC W/AUTO DIFF WBC: CPT

## 2022-11-13 PROCEDURE — 83993 ASSAY FOR CALPROTECTIN FECAL: CPT

## 2022-11-13 PROCEDURE — 6360000002 HC RX W HCPCS: Performed by: NURSE PRACTITIONER

## 2022-11-13 PROCEDURE — 87506 IADNA-DNA/RNA PROBE TQ 6-11: CPT

## 2022-11-13 PROCEDURE — 84484 ASSAY OF TROPONIN QUANT: CPT

## 2022-11-13 PROCEDURE — 36415 COLL VENOUS BLD VENIPUNCTURE: CPT

## 2022-11-13 PROCEDURE — 80053 COMPREHEN METABOLIC PANEL: CPT

## 2022-11-13 PROCEDURE — 84100 ASSAY OF PHOSPHORUS: CPT

## 2022-11-13 PROCEDURE — 85520 HEPARIN ASSAY: CPT

## 2022-11-13 PROCEDURE — 83735 ASSAY OF MAGNESIUM: CPT

## 2022-11-13 PROCEDURE — 87449 NOS EACH ORGANISM AG IA: CPT

## 2022-11-13 PROCEDURE — 83630 LACTOFERRIN FECAL (QUAL): CPT

## 2022-11-13 PROCEDURE — 87493 C DIFF AMPLIFIED PROBE: CPT

## 2022-11-13 PROCEDURE — 2060000000 HC ICU INTERMEDIATE R&B

## 2022-11-13 PROCEDURE — 99233 SBSQ HOSP IP/OBS HIGH 50: CPT | Performed by: INTERNAL MEDICINE

## 2022-11-13 RX ORDER — LANOLIN ALCOHOL/MO/W.PET/CERES
3 CREAM (GRAM) TOPICAL NIGHTLY PRN
Status: DISCONTINUED | OUTPATIENT
Start: 2022-11-13 | End: 2022-11-16 | Stop reason: HOSPADM

## 2022-11-13 RX ADMIN — CARVEDILOL 25 MG: 25 TABLET, FILM COATED ORAL at 10:25

## 2022-11-13 RX ADMIN — HEPARIN SODIUM 1500 UNITS: 1000 INJECTION INTRAVENOUS; SUBCUTANEOUS at 06:22

## 2022-11-13 RX ADMIN — MELATONIN TAB 3 MG 3 MG: 3 TAB at 21:31

## 2022-11-13 RX ADMIN — ROSUVASTATIN CALCIUM 5 MG: 10 TABLET, FILM COATED ORAL at 21:30

## 2022-11-13 RX ADMIN — ASPIRIN 81 MG: 81 TABLET, COATED ORAL at 10:25

## 2022-11-13 RX ADMIN — TOPIRAMATE 50 MG: 25 TABLET, FILM COATED ORAL at 21:30

## 2022-11-13 RX ADMIN — ALLOPURINOL 100 MG: 100 TABLET ORAL at 10:25

## 2022-11-13 RX ADMIN — HYDROCODONE BITARTRATE AND ACETAMINOPHEN 1 TABLET: 5; 325 TABLET ORAL at 21:30

## 2022-11-13 RX ADMIN — TOPIRAMATE 50 MG: 25 TABLET, FILM COATED ORAL at 10:24

## 2022-11-13 RX ADMIN — CLOPIDOGREL BISULFATE 75 MG: 75 TABLET ORAL at 10:25

## 2022-11-13 RX ADMIN — LEVOTHYROXINE SODIUM 125 MCG: 0.03 TABLET ORAL at 06:01

## 2022-11-13 ASSESSMENT — PAIN SCALES - GENERAL: PAINLEVEL_OUTOF10: 4

## 2022-11-13 ASSESSMENT — PAIN DESCRIPTION - ORIENTATION: ORIENTATION: LEFT

## 2022-11-13 ASSESSMENT — PAIN DESCRIPTION - LOCATION: LOCATION: FLANK

## 2022-11-13 ASSESSMENT — PAIN DESCRIPTION - DESCRIPTORS: DESCRIPTORS: ACHING;DISCOMFORT

## 2022-11-13 NOTE — CONSULTS
[facility] Consult Note  Inpatient Setting - [address] [facility]    Provider: Justin Tamayo MD Patient ID:  Admission Date: 2022 Name: Caryle Beers Date: n/a MRN: [id number]   Patient Location: [id number] : 1940  Attending: Carmelo Davila MD Date of Service: 2022  PCP: Meir Grayson MD     Diagnoses:  1. NSTEMI (non-ST elevated myocardial infarction) ([facility] [address]      Pt here for cp and cards issues. Found to be in retention. Bills for 700. Clear urine. Denies prior gu hx or prior retention. Creat 1.6.   ct sept showed nl kidneys x few cysts    Assessment/Plan:  Retention,( ? Baseline)  Renal insuff  Cardiac issues    Recc    Can dc bills  Str cath prn bl scan over 550  Will follow    All the patients questions were answered in detail. She understands the plan as listed above. Review/order of labs  Review/order of radiology studies  discussion with referring MD  Decision to obtain old records or supplemental information from family. 13  transferring data from old records into today's office visit record  Independent review and interpretation of test or study   Total time spent face-to-face with the patient 15min, including greater than 50% of this time in discussion with the patient/family concerning the following:  Recommended tests  management options  risks/benefits of management options  importance of compliance  Prognosis  Risk factor reduction  Patient/family education                                                                                                                                                      CC:   Chief Complaint   Patient presents with    Shortness of Breath     From home by [facility]. Had 2 stents placed this week       HPI: Anurag Kelly is a 80 y.o. female. I saw the patient today for ret, and associated symptoms of cp, that have been present for days. Symptoms relieved by bills and aggravated by nothing.  She has tried the following treatments: bills. Past Medical History:   She has a past medical history of Allergic rhinitis, cause unspecified, Arthritis, Atrial fibrillation ([facility] [address] Bronchopneumonia, CAD (coronary artery disease), Cerebral artery occlusion with cerebral infarction [facility]), Chronic gouty arthropathy, Chronic kidney disease, Congestive heart failure, unspecified, Degeneration of cervical intervertebral disc, Essential and other specified forms of tremor, Gout, HIGH CHOLESTEROL, History of CVA (cerebrovascular accident), blood clots, Hypertension, Influenza (12/23/2017), Leakage of Watchman left atrial appendage closure device, Mitral valve stenosis and aortic valve insufficiency, Movement disorder, Pacemaker, Peptic ulcer, unspecified site, unspecified as acute or chronic, without mention of hemorrhage, perforation, or obstruction, Thyroid disease, Unspecified disorder of kidney and ureter, and Unspecified hypothyroidism. Past Surgical History:  She has a past surgical history that includes back surgery; Cholecystectomy; Cardiac surgery; Coronary artery bypass graft (1987); shoulder surgery; Cardiac catheterization (7/2012); hip surgery (Left, 03/16/2017); joint replacement; Cardiac catheterization (08/07/2018); Percutaneous Transluminal Coronary Angio (11/04/2014); pr njx aa&/strd tfrml epi lumbar/sacral 1 level (Right, 12/3/2018); Femoral-femoral Bypass Graft (N/A, 8/22/2019); femoral bypass (Left, 8/22/2019); and IR KYPHOPLASTY LUMBAR 1 VERTEBRAL BODY (5/14/2021). Allergies:    Allergies   Allergen Reactions    Aspirin Nausea Only    Diltiazem Anaphylaxis    Diltiazem Hcl Anaphylaxis    Lorazepam Other (See Comments)     hallucinations  hallucinations  hallucinations    Sulfa Antibiotics Rash and Hives    Atorvastatin Other (See Comments)     Muscle pains  Muscle pains  Muscle pains    Dabigatran Nausea Only     And indigestion  And indigestion    Aka Pradaxa  And indigestion  And indigestion  And indigestion Aka Pradaxa  And indigestion  And indigestion  And indigestion    Aka Pradaxa    Mysoline [Primidone]     Nsaids      Other reaction(s): Unknown    Other Other (See Comments)     Nitroglycerin patches causes severe headaches    Primidone      Other reaction(s): Unknown       Social History:  She reports that she quit smoking about 35 years ago. Her smoking use included cigarettes. She has a 28.00 pack-year smoking history. She has never used smokeless tobacco. She reports current alcohol use. She reports that she does not use drugs. Family History:  family history includes Cancer in her maternal grandmother, paternal grandmother, and sister; Diabetes in her brother and maternal uncle; Heart Disease in her brother, maternal aunt, and sister; Hypertension in her brother and paternal aunt; Stroke in her maternal aunt.     Medications:   Scheduled Meds:   allopurinol  100 mg Oral Daily    aspirin EC  81 mg Oral Daily    carvedilol  25 mg Oral BID WC    levothyroxine  125 mcg Oral Daily    rosuvastatin  5 mg Oral Nightly    topiramate  50 mg Oral BID    sodium chloride flush  5-40 mL IntraVENous 2 times per day    clopidogrel  75 mg Oral Daily     Continuous Infusions:   sodium chloride      heparin (PORCINE) Infusion 20 Units/kg/hr (11/13/22 0620)     PRN Meds:albuterol sulfate HFA, HYDROcodone-acetaminophen, nitroGLYCERIN, sodium chloride flush, sodium chloride, ondansetron **OR** ondansetron, polyethylene glycol, acetaminophen **OR** acetaminophen, perflutren lipid microspheres, heparin (porcine), heparin (porcine), nitroGLYCERIN    Review of Systems:  Constitutional: Negative for fever    Genitourinary: see HPI  Eyes: negative for sudden change in vision  EENT: no complaints  Cardiovascular: Negative for chest pain  Respiratory: Negative for shortness of breath  Gastrointestinal: Negative for nausea  Musculoskeletal: Negative for back pain   Neurological: Negative for weakness  Psychiatric: Negative for anxiety  Integumentary: Negative for rashes or adenopathy     Physical Exam:  Vitals:    11/13/22 0730   BP: 128/66   Pulse: 75   Resp: 16   Temp: 97.7 °F (36.5 °C)   SpO2: 96%     Constitutional: NAD, well-developed, well-nourished. HEENT: MMM. Hearing intact. PERRL  Neck: no thyroid masses appreciated. Trachea is midline. Neck appears unremarkable   Lymph: no palpable adenopathy in supraclavicular, or axillary lymph nodes  Cardiovascular: Regular rate. No peripheral edema  Respiratory: Respirations are even and non-labored. No audible breath sounds. Genitourinary: external genitals show no irritation or erythema, urethral meatus appears normal in size and location, urethra is without tenderness or masses, bladder is non-tender, vagina is without masses or discharge, adnexa is without masses or tenderness. Anus and Perineum are unremarkable, no external lesions appreciated. Abdomen: Soft. No distension, tenderness, hernias, masses or guarding. No CVA tenderness. No hernias appreciated. Liver and spleen appear normal  Psychiatric: A + O x 3, normal affect. Insight appears intact. Muskuloskeletal: LISANDRA x 4   Skin: Pink, warm and dry. No rashes on face and arms.     Labs:  Lab Results   Component Value Date    WBC 10.5 11/13/2022    HGB 12.7 11/13/2022    HCT 40.1 11/13/2022    MCV 80.0 11/13/2022     11/13/2022     Lab Results   Component Value Date    CREATININE 1.6 (H) 11/13/2022    BUN 48 (H) 11/13/2022     11/13/2022    K 3.8 11/13/2022     (H) 11/13/2022    CO2 19 (L) 11/13/2022       Imaging:   kylah Esteves MD  11/13/2022

## 2022-11-13 NOTE — PROGRESS NOTES
Call placed to lab for unresulted anti xa orders timed for 0400. Holley from lab reports specimen was '' Just received about 5 minutes ago'' and should be resulted in 10-15 minutes.

## 2022-11-13 NOTE — ACP (ADVANCE CARE PLANNING)
Advance Care Planning     Advance Care Planning Inpatient Note  [facility] Department    Today's Date: 11/13/2022  Unit: [facility]    Received request from IDT Member. Upon review of chart and communication with care team, patient's decision making abilities are not in question. . Patient was/were present in the room during visit. Goals of ACP Conversation:  Discuss advance care planning documents    Health Care Decision Makers:       Primary Decision Maker: Prabhu Sandoval Child - [phone]    Secondary Decision Maker: Chet Lamas - Child - [phone]    Supplemental (Other) Decision Maker: Cayetano Realt - [phone]  Summary:  No Decision Maker named by patient at this time    [address] (Patient Wishes):  None     Assessment:  Patient was alert and oriented. Pt requested to review Ad documents. Pt was given blank copies of POA/LW to review and complete when appropriate. Pt was instructed to notify the nurse to contact [phone] [place] [address] when completed to witness pt sign AD documents. Provided spiritual/emotional support through active listening, affirmed feelings, thoughts and concerns. Pt expressed hope and gratitude. Interventions:  Provided education on documents for clarity and greater understanding  Discussed and provided education on state decision maker hierarchy    Care Preferences Communicated:   No    Outcomes/Plan:  ACP Discussion: Postponed. Pt will notify the nurse when appropriate.     Electronically signed by Reyes Ye on 11/13/2022 at 11:04 AM

## 2022-11-13 NOTE — PLAN OF CARE
Problem: Discharge Planning  Goal: Discharge to home or other facility with appropriate resources  Outcome: Progressing     Problem: Pain  Goal: Verbalizes/displays adequate comfort level or baseline comfort level  11/12/2022 2327 by Que Abrams RN  Outcome: Progressing  11/12/2022 1129 by Tom Kasper RN  Outcome: Progressing  Note: Patient denies any pain at this time time. Will continue to monitor. Problem: Skin/Tissue Integrity  Goal: Absence of new skin breakdown  Description: 1. Monitor for areas of redness and/or skin breakdown  2. Assess vascular access sites hourly  3. Every 4-6 hours minimum:  Change oxygen saturation probe site  4. Every 4-6 hours:  If on nasal continuous positive airway pressure, respiratory therapy assess nares and determine need for appliance change or resting period. Outcome: Progressing     Problem: Safety - Adult  Goal: Free from fall injury  11/12/2022 2327 by Que Abrams RN  Outcome: Progressing  11/12/2022 1129 by Tom Kasper RN  Outcome: Progressing  Flowsheets (Taken 11/12/2022 0112 by Gurmeet Fraga RN)  Free From Fall Injury: Instruct family/caregiver on patient safety  Note: Patient remains absent from falls at this time. Remains alert and oriented, in bed with call light and belongings in reach. Non-slip footwear on and 2/4 siderails raised. Bed remains in lowest/locked position at all times with alarm activated. Fall precautions in place. Patient encouraged to use call light to request assistance, v/u.  Will continue to monitor.        Problem: ABCDS Injury Assessment  Goal: Absence of physical injury  Outcome: Progressing     Problem: Respiratory - Adult  Goal: Achieves optimal ventilation and oxygenation  Outcome: Progressing     Problem: Cardiovascular - Adult  Goal: Maintains optimal cardiac output and hemodynamic stability  11/12/2022 2327 by Que Abrams RN  Outcome: Progressing  11/12/2022 1129 by Tom Kaspre RN  Outcome: Progressing  Note: Patient VSS at this time. Remains on heparin gtt per orders - see MAR. Will continue to monitor.

## 2022-11-13 NOTE — PROGRESS NOTES
Hospitalist Progress Note      PCP: Kortney Phelan MD    Date of Admission: 11/11/2022    Chief Complaint: Shortness of breath    Hospital Course: Charissa Damon is a 80 y.o. female with history of A. fib, s/p watchman implanted 5/17/2021, TIA, chronic systolic CHF, CAD with recent drug-eluting stent x2 on 10/19/2022, CABG in 87, Hypertension, HLD, and CKD 3. 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 She presented to ER for shortness of breath. In the ED troponin was elevated at 0.18 and EKG without acute ST-T changes. She was started on heparin drip and Lasix. Subjective: Patient seen and examined with daughter at bedside. No interval events. Denies any chest pain. Dyspnea improved. Serum creatinine increasing. Patient complaining of chronic diarrhea.       Medications:  Reviewed    Infusion Medications    sodium chloride      heparin (PORCINE) Infusion 20 Units/kg/hr (11/13/22 3682)     Scheduled Medications    allopurinol  100 mg Oral Daily    aspirin EC  81 mg Oral Daily    carvedilol  25 mg Oral BID WC    levothyroxine  125 mcg Oral Daily    rosuvastatin  5 mg Oral Nightly    topiramate  50 mg Oral BID    sodium chloride flush  5-40 mL IntraVENous 2 times per day    clopidogrel  75 mg Oral Daily     PRN Meds: albuterol sulfate HFA, HYDROcodone-acetaminophen, nitroGLYCERIN, sodium chloride flush, sodium chloride, ondansetron **OR** ondansetron, polyethylene glycol, acetaminophen **OR** acetaminophen, perflutren lipid microspheres, heparin (porcine), heparin (porcine), nitroGLYCERIN      Intake/Output Summary (Last 24 hours) at 11/13/2022 1331  Last data filed at 11/13/2022 0602  Gross per 24 hour   Intake 240 ml   Output 875 ml   Net -635 ml       Physical Exam Performed:    /65   Pulse 70   Temp 98 °F (36.7 °C) (Axillary)   Resp 16   Ht 5' 3\" (1.6 m)   Wt 106 lb 14.4 oz (48.5 kg)   SpO2 97%   BMI 18.94 kg/m² General appearance: Elderly and frail, no apparent distress, appears stated age and cooperative. HEENT: Pupils equal, round, and reactive to light. Conjunctivae clear. Neck: Supple, with full range of motion. No jugular venous distention. Trachea midline. Respiratory:  Normal respiratory effort. Decreased air entry at the bases with Rales. Cardiovascular: Regular rate and rhythm with normal S1/S2 without murmurs, rubs or gallops. Abdomen: Soft, non-tender, non-distended with normal bowel sounds. Musculoskeletal: No clubbing, cyanosis or edema bilaterally. Full range of motion without deformity. Skin: Skin color, texture, turgor normal.  No rashes or lesions. Neurologic:  Neurovascularly intact without any focal sensory/motor deficits.  Cranial nerves: II-XII intact, grossly non-focal.  Psychiatric: Alert and oriented, thought content appropriate, normal insight  Capillary Refill: Brisk, 3 seconds, normal   Peripheral Pulses: +2 palpable, equal bilaterally       Labs:   Recent Labs     11/12/22  0110 11/12/22 0433 11/13/22 0436   WBC 18.5* 18.0* 10.5   HGB 14.4 13.8 12.7   HCT 46.1 44.2 40.1   * 437 335     Recent Labs     11/11/22 1855 11/12/22 0433 11/13/22 0436    144 142   K 4.3 3.5 3.8   * 112* 111*   CO2 16* 18* 19*   BUN 37* 40* 48*   CREATININE 1.3* 1.3* 1.6*   CALCIUM 9.4 9.1 8.8   PHOS  --   --  2.8     Recent Labs     11/11/22 1855 11/12/22 0433 11/13/22 0436   AST 17 17 17   ALT 8* 9* 8*   BILITOT 0.6 0.5 0.3   ALKPHOS 95 87 75     Recent Labs     11/12/22  0235   INR 1.27*     Recent Labs     11/12/22  0433 11/12/22  1211 11/13/22  1215   TROPONINI 0.17* 0.11* 0.09*       Urinalysis:      Lab Results   Component Value Date/Time    NITRU Negative 11/12/2022 06:06 PM    WBCUA 2 11/12/2022 06:06 PM    BACTERIA None Seen 11/12/2022 06:06 PM    RBCUA 1 11/12/2022 06:06 PM    BLOODU TRACE 11/12/2022 06:06 PM    SPECGRAV 1.020 11/12/2022 06:06 PM    GLUCOSEU Negative 11/12/2022 06:06 PM    GLUCOSEU NEGATIVE 12/04/2011 06:40 PM       Radiology:  CT CHEST PULMONARY EMBOLISM W CONTRAST   Final Result   1. Motion limited evaluation with no evidence of acute pulmonary embolism to   the segmental level. 2. Pulmonary edema with small right and trace left pleural effusions. 3. Reflux of contrast into the hepatic veins can be seen in the setting of   right heart failure. 4. Unchanged enlargement of main pulmonary artery which can be seen in the   setting of pulmonary artery hypertension. 5. Suspected small increase in the size of ascending aortic ectasia which now   measures 4.2 cm and previously measured 3.8 cm. Consider nonemergent   vascular consultation. 6. Chronic findings outlined in body of the report. XR CHEST PORTABLE   Final Result   Severe cardiomegaly, with vascular congestive changes and mild interstitial   pulmonary edema. Assessment/Plan:    Active Hospital Problems    Diagnosis     Acute respiratory failure with hypoxia ([facility] [address] [J96.01]      Priority: Medium    NSTEMI (non-ST elevated myocardial infarction) ([address] [I21.4]     Chronic combined systolic (congestive) and diastolic (congestive) heart failure (HCC) [I50.42]     Essential hypertension, benign [I10]        Acute on chronic combined systolic and diastolic HF with hypoxia:   Echo 7/20/2022: LVEF 50 to 60%. Currently requiring 2 L nasal cannula. Continue CHF protocol. IV Lasix discontinued. Wean off supplemental oxygen as tolerated. Repeat echocardiogram pending. NSTEMI:  Cardiology consulted: Appreciate input. Continue heparin drip, Crestor, Coreg, aspirin and Plavix. Interventional cardiology evaluation for cath on Monday. GIANA on CKD 3:   Nephrology consulted: Appreciate input. Monitor BMP off Lasix. Urinary Retention:  Bladder scan in the ED with ~700 cc of urine. Urinalysis negative for UTI. Urology consulted: Appreciate input.   Only discontinued for voiding trial today. Bladder scan every 6 with CIC if PVR greater than 550 cc. Chronic diarrhea:  Stool work-up including C. difficile, GI PCR pathogens, ova and parasites,  Fecal leukocytes, fecal calprotectin. We will resume Imodium if negative for infectious etiology. Paroxysmal A-fib: s/p watchman. Continue Coreg. Hyperlipidemia: Continue statin      Leukocytosis: Resolved. ? Reactive  Unclear etiology. WBC 20.6K on admission trended down to 10.5K. UA negative for UTI. Pro calcitonin 0.29. Remained afebrile and hemodynamically stable. CXR: Severe cardiomegaly with pulmonary vascular congestion/mild interstitial edema and.  CT chest without pneumonia. Follow-up blood cultures. ESR 12, CRP 87.7, respiratory viral panel negative. Continue monitoring CBC off antibiotics. DVT Prophylaxis: On heparin drip  Diet: ADULT DIET; Regular;  Low Sodium (2 gm)  Diet NPO  Code Status: Full Code  PT/OT Eval Status: Requested    Dispo -once medically stable    Cooper Bagley MD

## 2022-11-13 NOTE — PROGRESS NOTES
[address]   Progress Note  CHF/Pulmonary Hypertension Cardiology    Chief complaint: We are following this patient for shortness of breath, CAD, elevated troponin  HPI:  Lucille Pruett is an 81 yo female with a history of CAD with recent drug eluting stent x 2 on 10/19/22, CABG in 87, hypertension, HLD, and CKD. She presented to the ED for shortness of breath. Her daughter came with her to the ED and felt that she was confused. She also admitted to shortness of breath. She denies chest pain or cough. No fever, chills. No lower extremity edema. No nausea or vomiting. No abdominal pain. She has a history of atrial fibrillation on Xarelto, TIA, CKD, CHF. Her last LVEF was 45% on 7/19/19. She had Watchman implanted 5/17/21. 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 She had stent to left main and proximal circumflex on 10/19/22     We are consulted for shortness of breath and elevated troponin. Her troponin was slightly elevated on admission, but increased to 0.18, now 0.11.  she is on a heparin drip. She does not appear to be grossly fluid overloaded. Her BNP is actually lower than it was on 10/21/22. She is receiving IV Lasix. I/O's negative 1000 cc     Labs:  Sodium 144  K 3.5  BUN/cre 40/1. 3  Magnesium 2.2  Troponin 0.04, 0.18, 0.17  BNP 8979 (was 14k on 10/21/22)  WBC 18.0  H/H 13.8/44.2     CXR:  Impression   Severe cardiomegaly, with vascular congestive changes and mild interstitial   pulmonary edema. DONA:  7/20/22:   Summary   Normal left ventricle size, wall thickness, and systolic function with an   estimated ejection fraction of 55-60%. Moderate mitral regurgitation is present. Left atrial size is dilated. There is a Watchman device seated with a feliciano-device leak measured at 3 mm   on the anterior side. No thrombus seen. Moderate tricuspid regurgitation.    Systolic pulmonary artery pressure (SPAP) estimated at 41 mmHg (RA pressure   3 mmHg), consistent with mild pulmonary hypertension. Meds prior to admission:  Carvedilol 25 bid  Crestor 5 qd  Aspirin 81 qd  Torsemide 20 qd  Plavix 75 qd         ROS:  her shortness of breath is stable overnight. With IV Lasix, her creatinine has increased to 1.6.  this was stopped last night. Repeat troponin pending from this am  Creatinine increased to 1.6 on IV Lasix    Medications/Labs all Reviewed    Lab Results   Component Value Date    WBC 10.5 11/13/2022    HGB 12.7 11/13/2022    HCT 40.1 11/13/2022    MCV 80.0 11/13/2022     11/13/2022     Lab Results   Component Value Date    CREATININE 1.6 (H) 11/13/2022    BUN 48 (H) 11/13/2022     11/13/2022    K 3.8 11/13/2022     (H) 11/13/2022    CO2 19 (L) 11/13/2022     Lab Results   Component Value Date    INR 1.27 (H) 11/12/2022    PROTIME 15.9 (H) 11/12/2022        Physical Examination:    /66   Pulse 70   Temp 97.7 °F (36.5 °C) (Oral)   Resp 16   Ht 5' 3\" (1.6 m)   Wt 106 lb 14.4 oz (48.5 kg)   SpO2 96%   BMI 18.94 kg/m²      Chronically ill appearing  HEENT:  NC/AT  Respiratory:  Resp Assessment: Normal respiratory effort  Resp Auscultation: Clear to auscultation bilaterally   Cardiovascular:   Auscultation: regular rate and rhythm, normal S1S2, no murmur, rub or gallop  Palpation:  Nl PMI  JVP:  normal  Extremities: No Edema  Abdomen:  Soft, non-tender  Normal bowel sounds  Extremities:   No Cyanosis or Clubbing  Neurological/Psychiatric:  Oriented to time, place, and person  Non-anxious  Skin Warm and dry    Lab Results   Component Value Date/Time     11/13/2022 04:36 AM     11/12/2022 04:33 AM     11/11/2022 06:55 PM    K 3.8 11/13/2022 04:36 AM    K 3.5 11/12/2022 04:33 AM    K 4.3 11/11/2022 06:55 PM    K 3.5 10/21/2022 04:41 AM    K 3.6 10/20/2022 05:26 AM    K 4.0 09/16/2022 05:35 AM    BUN 48 11/13/2022 04:36 AM    BUN 40 11/12/2022 04:33 AM    BUN 37 11/11/2022 06:55 PM    CREATININE 1.6 11/13/2022 04:36 AM    CREATININE 1.3 11/12/2022 04:33 AM    CREATININE 1.3 11/11/2022 06:55 PM    GLUCOSE 85 11/13/2022 04:36 AM    GLUCOSE 133 11/12/2022 04:33 AM    GLUCOSE 80 06/08/2022 02:37 PM    GLUCOSE 85 10/20/2021 02:03 PM     Lab Results   Component Value Date    PROBNP 8,979 (H) 11/11/2022    PROBNP 14,854 (H) 10/21/2022    PROBNP 14,427 (H) 09/12/2022     Lab Results   Component Value Date    ALT 8 (L) 11/13/2022    ALT 9 (L) 11/12/2022    AST 17 11/13/2022    AST 17 11/12/2022     Lab Results   Component Value Date/Time    HGB 12.7 11/13/2022 04:36 AM    HGB 13.8 11/12/2022 04:33 AM    HCT 40.1 11/13/2022 04:36 AM    HCT 44.2 11/12/2022 04:33 AM     11/13/2022 04:36 AM     11/12/2022 04:33 AM     Lab Results   Component Value Date/Time    TRIG 227 01/23/2022 04:22 AM    TRIG 97 03/26/2020 04:31 AM    HDL 27 01/23/2022 04:22 AM    HDL 29 03/26/2020 04:31 AM    HDL 31 06/06/2012 08:49 AM    HDL 27 09/14/2011 11:53 AM    LDLCALC 63 01/23/2022 04:22 AM    LDLCALC 72 03/26/2020 04:31 AM    LDLDIRECT 54 07/27/2012 05:40 AM    LDLDIRECT 48 06/06/2012 08:49 AM     Labs were reviewed including labs from other hospital systems through [facility]. Cardiac testing was reviewed including echos, nuclear scans, cardiac catheterization, including from other hospital systems through [facility]. Assessment:    1. NSTEMI (non-ST elevated myocardial infarction) ([facility] [address]     2. CAD, recent stent in October  3. Chronic combined systolic and diastolic HF  4. BNP lower than a month ago  5.  hypertension  6. Risk in creatinine     Plan:   Continue heparin drip for now  Have interventional cardiology see her tomorrow due to SOB not due to fluid overload, NSTEMI, and recent stenting  Stop lasix   Continue carvedilol, plavix, aspirin, crestor  Will observe over the weekend, and have interventional cardiology see her on Monday for cardiac cath.   Npo after midnight     The patient was seen for > 35 minutes. I reviewed interval history, physical exam, review of data including labs, imaging, development and implementation of treatment plan and coordination of complex care.  More than 50% of the time was devoted to counseling the patient on their diagnoses/treatments, as well as coordination of care with the other care teams      NYHA Class: 800 Jina Kingsley MD, 11/13/2022 10:57 AM

## 2022-11-13 NOTE — PROGRESS NOTES
MD Beata George MD Evonnie Pellegrini, MD                  Office: [phone]                      Fax: [phone]             4 [facility]                   NEPHROLOGY INPATIENT PROGRESS NOTE:     PATIENT NAME: Chitra Hercules  : 1940  MRN: [id number]      RECOMMENDATIONS:   - Lasix 40 iv BID - hold w/ worsening GIANA : pre-renal + d/to AUR  - fup w/ cardiology    - check ECHO      - bills insertion needed for ARU   - urology consulted - \" Can dc bills , Str cath prn bl scan over 550 , Will follow     - recheck UA in 48hrs, to fup on hematuria on UA  - no need for dialysis,  at higher risk for decompensation, needing closer monitoring. D/C plan from renal stand point: ~2-3 days- fup fluid overload  Continue to fup w/ in outpt     D/W pt's daughter too     IMPRESSION:       Admitted on:  2022  5:07 PM   For:  NSTEMI (non-ST elevated myocardial infarction) ([address] [I21.4]  Acute respiratory failure with hypoxia ([address] [J96.01]      H/o GIANA - not now  Proteinuric CKD-3B    - was on torsemide + Aldactone      - BL CKD - stage 3B ,   - Scr ~ 1.2-1.3    - eGFR BL: 30s  - at risk for CI-GIANA , as s/p CTA       Urinary retention   - >700 cc in ER - needing bills insertion      Associated problems:   - Volume status: hyper-volemic  Acute on chronic- dheart failure-  CXR - Cardiomegaly with mild pulmonary venous congestion. Weight in past around 116-119 pounds,   Lowest 113 lbs  Now - 116 lbs in office   At home - ~116 lbs  Now inpatient: 110 lbs     : HTN : no need for tight control   : Na: WNL    - Azotemia: pre-renal  - Electrolytes: K: WNL  Mag WNL  - Acid-Base: acidosis - non AGMA   - Anemia: no         Other major problems: Management per primary and other consulting teams.          Hospital Problems             Last Modified POA    * (Principal) Acute respiratory failure with hypoxia ([address] 2022 Yes    Essential hypertension, benign 2022 Yes    Chronic combined systolic (congestive) and diastolic (congestive) heart failure ([facility] 11/12/2022 Yes    NSTEMI (non-ST elevated myocardial infarction) ([facility] 11/12/2022 Yes       : other supportive care :   - Check daily renal function panel with electrolytes-phosphorus  - Strict monitoring of I/Os, daily weight  - non-Renal feeds/diet  - Current medications reviewed. Please refer to the orders. High Complexity. Multiple complex problems. Discussed with patient and treatment team-    Time spent > 30 (~35) minutes. Thank you for allowing me to participate in this patient's care. Please do not hesitate to contact me anytime. We will follow along with you. Reyes Deng MD,  [facility] [id number] [place] [address] [place]: [phone] or [address]  Fax: [phone]        =======================================================================================   =======================================================================================  Subjective / interval history:   Patient was seen comfortably  in the bed,   Reported no active complaints,   Renal function worse  I/O, weights reviewed     No current active complaints. Patient denied chest pain / dizziness/lightheadedness/syncope/ SOB / leg edema. .               Past medical, Surgical, Social, Family medical history reviewed by me.      PAST MEDICAL HISTORY:   Past Medical History:   Diagnosis Date    Allergic rhinitis, cause unspecified     Arthritis     Atrial fibrillation (HCC)     Bronchopneumonia     CAD (coronary artery disease)     stent:  post cataract surgery (CABG)    Cerebral artery occlusion with cerebral infarction (HCC)     TIA    Chronic gouty arthropathy     Chronic kidney disease     Congestive heart failure, unspecified     Degeneration of cervical intervertebral disc     Essential and other specified forms of tremor     Gout     HIGH CHOLESTEROL     History of CVA (cerebrovascular accident)     Hx of blood clots Hypertension     Influenza 12/23/2017    Leakage of Watchman left atrial appendage closure device     Mitral valve stenosis and aortic valve insufficiency     Movement disorder     back problems    Pacemaker     Peptic ulcer, unspecified site, unspecified as acute or chronic, without mention of hemorrhage, perforation, or obstruction     Thyroid disease     Unspecified disorder of kidney and ureter     Unspecified hypothyroidism      PAST SURGICAL HISTORY:   Past Surgical History:   Procedure Laterality Date    BACK SURGERY      CARDIAC CATHETERIZATION  7/2012    CARDIAC CATHETERIZATION  08/07/2018    Unsuccesful  of RCA    CARDIAC SURGERY      CABG & Cardiac ablation    CHOLECYSTECTOMY      CORONARY ARTERY BYPASS GRAFT  1987    LIMA- Diag/LAD, SVG- RCA    FEMORAL BYPASS Left 8/22/2019    LEFT FEMORAL TO POPLITEAL BYPASS GRAFT performed by Anastacia Lyman MD at [facility] ER GRAFT N/A 8/22/2019    FEMORAL TO FEMORAL BYPASS performed by Anastacia Lyman MD at [address] [facility] Left 03/16/2017    Left hip pinning    IR KYPHOPLASTY LUMBAR FIRST LEVEL  5/14/2021    IR KYPHOPLASTY LUMBAR FIRST LEVEL 5/14/2021 [facility] SPECIAL PROCEDURES    JOINT REPLACEMENT      NM NJX AA&/STRD TFRML EPI LUMBAR/SACRAL 1 LEVEL Right 12/3/2018    RIGHT L3 AND L4 LUMBAR TRANSFORAMINAL EPIRUAL STEROID INJECTION WITH FLUOROSCOPY performed by Mindy Pierson MD at [facility]    PTCA  11/04/2014    MARLENY - 3.0 x 28 to the Ist Diag    SHOULDER SURGERY      left     FAMILY HISTORY:   Family History   Problem Relation Age of Onset    Cancer Sister     Heart Disease Sister     Diabetes Brother     Hypertension Brother     Heart Disease Brother     Stroke Maternal Aunt     Heart Disease Maternal Aunt     Diabetes Maternal Uncle     Hypertension Paternal Aunt     Cancer Maternal Grandmother     Cancer Paternal Grandmother     Rheum Arthritis Neg Hx     Lupus Neg Hx      SOCIAL HISTORY:   Social History     Socioeconomic History    Marital status:      Spouse name: Jasper Hudson    Number of children: 3    Years of education: 12    Highest education level: None   Tobacco Use    Smoking status: Former     Packs/day: 1.00     Years: 28.00     Pack years: 28.00     Types: Cigarettes     Quit date: 1987     Years since quittin.8    Smokeless tobacco: Never    Tobacco comments:     H.O.smoking at age 15 / smoked up to 1 p.p.d / quit    Vaping Use    Vaping Use: Never used   Substance and Sexual Activity    Alcohol use: Yes     Comment: social    Drug use: No    Sexual activity: Not Currently     Social Determinants of Health     Financial Resource Strain: Low Risk     Difficulty of Paying Living Expenses: Not hard at all   Food Insecurity: No Food Insecurity    Worried About [address] in the Last Year: Never true    [address] in the Last Year: Never true   Transportation Needs: No Transportation Needs    Lack of Transportation (Medical): No    Lack of Transportation (Non-Medical): No   Physical Activity: Inactive    Days of Exercise per Week: 0 days    Minutes of Exercise per Session: 0 min   Stress: No Stress Concern Present    Feeling of Stress : Not at all   Social Connections: Socially Isolated    Frequency of Communication with Friends and Family: Three times a week    Frequency of Social Gatherings with Friends and Family: Three times a week    Attends [employer] Services: Never    Active Member of Clubs or Organizations: No    Attends Club or Organization Meetings: Never    Marital Status:    Housing Stability: Low Risk     Unable to Pay for Housing in the Last Year: No    Number of Places Lived in the Last Year: 1    Unstable Housing in the Last Year: No         MEDICATIONS: reviewed by me. Medications Prior to Admission:  No current facility-administered medications on file prior to encounter.      Current Outpatient Medications on File Prior to Encounter   Medication Sig Dispense Refill (SYNTHROID) tablet 125 mcg, 125 mcg, Oral, Daily, Elsie Minick, APRN - CNP, 125 mcg at 11/13/22 0601    rosuvastatin (CRESTOR) tablet 5 mg, 5 mg, Oral, Nightly, Elsie Minick, APRN - CNP, 5 mg at 11/12/22 2142    topiramate (TOPAMAX) tablet 50 mg, 50 mg, Oral, BID, Elsie Minick, APRN - CNP, 50 mg at 11/13/22 1024    nitroGLYCERIN (NITROSTAT) SL tablet 0.4 mg, 0.4 mg, SubLINGual, Q5 Min PRN, Elsie Minick, APRN - CNP    sodium chloride flush 0.9 % injection 5-40 mL, 5-40 mL, IntraVENous, 2 times per day, Gm Peto, APRN - CNP, 10 mL at 11/12/22 1125    sodium chloride flush 0.9 % injection 5-40 mL, 5-40 mL, IntraVENous, PRN, Gm Peto, APRN - CNP, 10 mL at 11/12/22 2154    0.9 % sodium chloride infusion, , IntraVENous, PRN, Elsie Minick, APRN - CNP    ondansetron (ZOFRAN-ODT) disintegrating tablet 4 mg, 4 mg, Oral, Q8H PRN **OR** ondansetron (ZOFRAN) injection 4 mg, 4 mg, IntraVENous, Q6H PRN, Elsie Minick, APRN - CNP    polyethylene glycol (GLYCOLAX) packet 17 g, 17 g, Oral, Daily PRN, Gm Peto, APRN - CNP, 17 g at 11/12/22 0559    acetaminophen (TYLENOL) tablet 650 mg, 650 mg, Oral, Q6H PRN **OR** acetaminophen (TYLENOL) suppository 650 mg, 650 mg, Rectal, Q6H PRN, Elsie Minick, APRN - CNP    perflutren lipid microspheres (DEFINITY) injection 1.5 mL, 1.5 mL, IntraVENous, ONCE PRN, Elsie Minick, APRN - CNP    heparin (porcine) injection 3,010 Units, 60 Units/kg, IntraVENous, PRN, Elsie Minick, APRN - CNP, 3,010 Units at 11/12/22 1420    heparin (porcine) injection 1,500 Units, 30 Units/kg, IntraVENous, PRN, TIFFANIE Welch CNP, 1,500 Units at 11/13/22 0622    heparin 25,000 units in dextrose 5% 250 mL (premix) infusion, 5-30 Units/kg/hr, IntraVENous, Continuous, TIFFANIE Welch CNP, Last Rate: 10 mL/hr at 11/13/22 0620, 20 Units/kg/hr at 11/13/22 0620    clopidogrel (PLAVIX) tablet 75 mg, 75 mg, Oral, Daily, TIFFANIE Welch CNP, 75 mg at 11/13/22 1025 nitroGLYCERIN (NITROSTAT) SL tablet 0.4 mg, 0.4 mg, SubLINGual, Q5 Min PRN, Yassine Sutton MD      Allergies reviewed by me: Aspirin, Diltiazem, Diltiazem hcl, Lorazepam, Sulfa antibiotics, Atorvastatin, Dabigatran, Mysoline [primidone], Nsaids, Other, and Primidone    REVIEW OF SYSTEMS:  As mentioned in chief complaint and HPI , Subjective   All other 10-point review of systems: negative.          =======================================================================================     PHYSICAL EXAM:  Recent vital signs and recent I/Os reviewed by me. Wt Readings from Last 3 Encounters:   11/13/22 106 lb 14.4 oz (48.5 kg)   11/07/22 114 lb 6.4 oz (51.9 kg)   10/28/22 116 lb (52.6 kg)     BP Readings from Last 3 Encounters:   11/13/22 116/66   11/07/22 120/70   10/28/22 122/70     Patient Vitals for the past 24 hrs:   BP Temp Temp src Pulse Resp SpO2 Weight   11/13/22 1025 116/66 -- -- 70 -- -- --   11/13/22 0837 -- -- -- -- -- -- 106 lb 14.4 oz (48.5 kg)   11/13/22 0730 128/66 97.7 °F (36.5 °C) Oral 75 16 96 % --   11/13/22 0557 129/63 97.5 °F (36.4 °C) Oral 71 16 98 % --   11/13/22 0042 (!) 118/54 98.2 °F (36.8 °C) -- 80 16 96 % --   11/12/22 2006 -- -- -- -- -- 98 % --   11/12/22 1949 101/62 98 °F (36.7 °C) Oral 73 17 98 % --   11/12/22 1750 112/67 -- -- 74 -- -- --   11/12/22 1713 (!) 101/50 97 °F (36.1 °C) Axillary 77 -- 100 % --   11/12/22 1345 (!) 109/53 97.6 °F (36.4 °C) Oral 74 18 99 % --   11/12/22 1141 128/71 97.7 °F (36.5 °C) Axillary 69 -- 100 % --         Intake/Output Summary (Last 24 hours) at 11/13/2022 1105  Last data filed at 11/13/2022 0602  Gross per 24 hour   Intake 240 ml   Output 875 ml   Net -635 ml             Physical Exam  Vitals reviewed. Constitutional:       General: She is not in acute distress. Appearance: Normal appearance. She is ill-appearing. HENT:      Head: Normocephalic and atraumatic.       Right Ear: External ear normal.      Left Ear: External ear normal. Nose: Nose normal.      Mouth/Throat:      Mouth: Mucous membranes are moist. Mucous membranes are not dry. Eyes:      General: No scleral icterus. Conjunctiva/sclera: Conjunctivae normal.   Neck:      Vascular: No JVD. Cardiovascular:      Rate and Rhythm: Normal rate and regular rhythm. Heart sounds: S1 normal and S2 normal.   Pulmonary:      Effort: Respiratory distress (mild) present. Breath sounds: Rhonchi present. Abdominal:      General: Bowel sounds are normal. There is distension. Tenderness: There is no abdominal tenderness. Musculoskeletal:         General: Swelling present. No deformity. Cervical back: Normal range of motion and neck supple. Skin:     General: Skin is dry. Coloration: Skin is not jaundiced. Neurological:      Mental Status: She is alert and oriented to person, place, and time. Mental status is at baseline. Psychiatric:         Mood and Affect: Mood normal.         Behavior: Behavior normal.        =======================================================================================     DATA:  Diagnostic tests reviewed by me for today's visit:   (AS NEEDED FOR MY EVALUATION AND MANAGEMENT).        Recent Labs     11/11/22  1741 11/12/22  0110 11/12/22  0433 11/13/22 0436   WBC 20.6* 18.5* 18.0* 10.5   HCT 48.8* 46.1 44.2 40.1   * 460* 437 335       Iron Saturation:  No components found for: PERCENTFE  FERRITIN:  No results found for: FERRITIN  IRON:    Lab Results   Component Value Date/Time    IRON 54 11/09/2020 11:05 AM     TIBC:    Lab Results   Component Value Date/Time    TIBC 216 11/09/2020 11:05 AM       Recent Labs     11/11/22  1855 11/12/22  0433 11/13/22  0436    144 142   K 4.3 3.5 3.8   * 112* 111*   CO2 16* 18* 19*   BUN 37* 40* 48*   CREATININE 1.3* 1.3* 1.6*       Recent Labs     11/11/22  1855 11/12/22  0433 11/13/22 0436   CALCIUM 9.4 9.1 8.8   MG  --  2.20 2.30   PHOS  --   --  2.8       No results for input(s): PH, PCO2, PO2 in the last 72 hours.     Invalid input(s): Debra Freed    ABG:  No results found for: PH, PCO2, PO2, HCO3, BE, THGB, TCO2, O2SAT  VBG:    Lab Results   Component Value Date/Time    PHVEN 7.303 09/15/2022 11:04 AM    YWH7OHR 38.8 09/15/2022 11:04 AM    BEVEN -6.7 09/15/2022 11:04 AM    N6ATXRCH 97 09/15/2022 11:04 AM       LDH:  No results found for: LDH  Uric Acid:    Lab Results   Component Value Date/Time    LABURIC 4.8 05/28/2019 12:31 PM       PT/INR:    Lab Results   Component Value Date/Time    PROTIME 15.9 11/12/2022 02:35 AM    INR 1.27 11/12/2022 02:35 AM     Warfarin PT/INR:  No components found for: PTPATWAR, PTINRWAR  PTT:    Lab Results   Component Value Date/Time    APTT 42.5 01/22/2022 02:16 PM   [APTT}  Last 3 Troponin:    Lab Results   Component Value Date/Time    TROPONINI 0.11 11/12/2022 12:11 PM    TROPONINI 0.17 11/12/2022 04:33 AM    TROPONINI 0.18 11/12/2022 01:10 AM       U/A:    Lab Results   Component Value Date/Time    COLORU Yellow 11/12/2022 06:06 PM    PROTEINU Negative 11/12/2022 06:06 PM    PHUR 5.0 11/12/2022 06:06 PM    WBCUA 2 11/12/2022 06:06 PM    RBCUA 1 11/12/2022 06:06 PM    YEAST neg 03/09/2021 02:00 PM    BACTERIA None Seen 11/12/2022 06:06 PM    CLARITYU Clear 11/12/2022 06:06 PM    SPECGRAV 1.020 11/12/2022 06:06 PM    LEUKOCYTESUR TRACE 11/12/2022 06:06 PM    UROBILINOGEN 0.2 11/12/2022 06:06 PM    BILIRUBINUR Negative 11/12/2022 06:06 PM    BILIRUBINUR NEGATIVE 12/04/2011 06:40 PM    BLOODU TRACE 11/12/2022 06:06 PM    GLUCOSEU Negative 11/12/2022 06:06 PM    GLUCOSEU NEGATIVE 12/04/2011 06:40 PM     Microalbumen/Creatinine ratio:  No components found for: RUCREAT  24 Hour Urine for Protein:  No components found for: RAWUPRO, UHRS3, WLMH19SN, UTV3  24 Hour Urine for Creatinine Clearance:  No components found for: CREAT4, UHRS10, UTV10  Urine Toxicology:  No components found for: IAMMENTA, IBARBIT, IBENZO, ICOCAINE, IMARTHC, IOPIATES, [facility]    HgBA1c:    Lab Results   Component Value Date/Time    LABA1C 5.5 01/23/2022 04:22 AM     RPR:  No results found for: RPR  HIV:  No results found for: HIV  REMY:  No results found for: ANATITER, REMY  RF:  No results found for: RF  DSDNA:  No components found for: DNA  AMYLASE:  No results found for: AMYLASE  LIPASE:    Lab Results   Component Value Date/Time    LIPASE 25.0 09/14/2022 07:11 PM     Fibrinogen Level:  No components found for: FIB       BELOW MENTIONED RADIOLOGY STUDY RESULTS BY ME (AS NEEDED FOR MY EVALUATION AND MANAGEMENT). XR CHEST PORTABLE    Result Date: 11/11/2022  EXAMINATION: ONE X-RAY VIEW OF THE CHEST 11/11/2022 5:33 pm COMPARISON: September 10, 2022 HISTORY: ORDERING SYSTEM PROVIDED HISTORY:  Chest pain no fever or cough. TECHNOLOGIST PROVIDED HISTORY: Reason for Exam:  Chest pain no fever or cough. FINDINGS: The cardiomediastinal silhouette is markedly enlarged. Pacer leads are stable in position. Vascular congestive changes are present with mild interstitial pulmonary edema. No evidence of lobar infiltrate. No pleural effusion or pneumothorax. Scarring at the left costophrenic angle is grossly stable. Severe cardiomegaly, with vascular congestive changes and mild interstitial pulmonary edema. CT CHEST PULMONARY EMBOLISM W CONTRAST    Result Date: 11/11/2022  EXAMINATION: CTA OF THE CHEST 11/11/2022 7:53 pm TECHNIQUE: CTA of the chest was performed after the administration of intravenous contrast.  Multiplanar reformatted images are provided for review. MIP images are provided for review. Automated exposure control, iterative reconstruction, and/or weight based adjustment of the mA/kV was utilized to reduce the radiation dose to as low as reasonably achievable.  COMPARISON: 09/15/2022, and 05/18/2021 HISTORY: ORDERING SYSTEM PROVIDED HISTORY: chest pain, dyspnea TECHNOLOGIST PROVIDED HISTORY: Reason for exam:->chest pain, dyspnea Decision Support Exception - unselect if not a suspected or confirmed emergency medical condition->Emergency Medical Condition (MA) Reason for Exam: chest pain, dyspnea Relevant Medical/Surgical History: Shortness of Breath (From home by [facility]. Had 2 stents placed this week) FINDINGS: Pulmonary Arteries: The pulmonary arteries are adequately opacified for evaluation. Evaluation is limited by respiratory motion. There is no evidence of acute pulmonary embolus to the segmental level. Stable enlargement of the main pulmonary artery which can be seen in the setting of pulmonary artery hypertension. Mediastinum: Diffuse ectasia of the ascending aorta measuring up to 4.2 cm may be mildly increased since the comparison study when it measured up to 3.8 cm. Unchanged cardiomegaly with an implanted cardiac device in place. Coronary artery disease. The patient is status post median sternotomy. Moderate calcified atherosclerotic plaque. A left atrial appendage Watchman device is in place. Mildly prominent right hilar lymph nodes are not significantly changed. No lymphadenopathy by size criteria. Lungs/pleura: Small right and trace left pleural effusions. Left pleural calcifications which could indicate asbestos related pleural disease. Areas of bilateral septal thickening and perihilar ground-glass attenuation, more prominent on the right and most likely reflective of pulmonary edema. No pneumothorax. Scattered calcified granulomas. Within the limitations of respiratory motion, there are no suspicious pulmonary nodules. Upper Abdomen: Limited images of the upper abdomen demonstrate reflux of contrast into the hepatic veins which can be seen in the setting of right heart failure. No acute upper abdominal findings. Soft Tissues/Bones: No acute bone or soft tissue abnormality. 1. Motion limited evaluation with no evidence of acute pulmonary embolism to the segmental level. 2. Pulmonary edema with small right and trace left pleural effusions.  3. Reflux of contrast into the hepatic veins can be seen in the setting of right heart failure. 4. Unchanged enlargement of main pulmonary artery which can be seen in the setting of pulmonary artery hypertension. 5. Chronic findings outlined in body of the report. This report was transcribed using voice recognition software, mainly. So please excuse brevity and/or typos. Every effort was made to ensure accuracy, however, inadvertent computerized transcription errors may be present. Please contact us, if any questions or clarifications are needed.

## 2022-11-14 LAB
A/G RATIO: 1.6 (ref 1.1–2.2)
ALBUMIN SERPL-MCNC: 3.4 G/DL (ref 3.4–5)
ALP BLD-CCNC: 67 U/L (ref 40–129)
ALT SERPL-CCNC: 8 U/L (ref 10–40)
ANION GAP SERPL CALCULATED.3IONS-SCNC: 11 MMOL/L (ref 3–16)
ANTI-XA UNFRAC HEPARIN: 0.3 IU/ML (ref 0.3–0.7)
AST SERPL-CCNC: 14 U/L (ref 15–37)
BASOPHILS ABSOLUTE: 0.2 K/UL (ref 0–0.2)
BASOPHILS RELATIVE PERCENT: 2.1 %
BILIRUB SERPL-MCNC: 0.3 MG/DL (ref 0–1)
BUN BLDV-MCNC: 52 MG/DL (ref 7–20)
C. DIFFICILE TOXIN MOLECULAR: ABNORMAL
CALCIUM SERPL-MCNC: 8.9 MG/DL (ref 8.3–10.6)
CHLORIDE BLD-SCNC: 109 MMOL/L (ref 99–110)
CO2: 22 MMOL/L (ref 21–32)
CREAT SERPL-MCNC: 1.8 MG/DL (ref 0.6–1.2)
EOSINOPHILS ABSOLUTE: 0.3 K/UL (ref 0–0.6)
EOSINOPHILS RELATIVE PERCENT: 3.8 %
GFR SERPL CREATININE-BSD FRML MDRD: 28 ML/MIN/{1.73_M2}
GI BACTERIAL PATHOGENS BY PCR: NORMAL
GIARDIA ANTIGEN STOOL: NORMAL
GLUCOSE BLD-MCNC: 94 MG/DL (ref 70–99)
HCT VFR BLD CALC: 38.7 % (ref 36–48)
HEMOGLOBIN: 12 G/DL (ref 12–16)
LYMPHOCYTES ABSOLUTE: 1.1 K/UL (ref 1–5.1)
LYMPHOCYTES RELATIVE PERCENT: 14.1 %
MAGNESIUM: 2.2 MG/DL (ref 1.8–2.4)
MCH RBC QN AUTO: 24.9 PG (ref 26–34)
MCHC RBC AUTO-ENTMCNC: 31.1 G/DL (ref 31–36)
MCV RBC AUTO: 80.3 FL (ref 80–100)
MONOCYTES ABSOLUTE: 0.5 K/UL (ref 0–1.3)
MONOCYTES RELATIVE PERCENT: 6.9 %
NEUTROPHILS ABSOLUTE: 5.5 K/UL (ref 1.7–7.7)
NEUTROPHILS RELATIVE PERCENT: 73.1 %
ORGANISM: ABNORMAL
PDW BLD-RTO: 19.6 % (ref 12.4–15.4)
PHOSPHORUS: 3.2 MG/DL (ref 2.5–4.9)
PLATELET # BLD: 305 K/UL (ref 135–450)
PMV BLD AUTO: 9.3 FL (ref 5–10.5)
POTASSIUM SERPL-SCNC: 3.3 MMOL/L (ref 3.5–5.1)
RBC # BLD: 4.82 M/UL (ref 4–5.2)
SODIUM BLD-SCNC: 142 MMOL/L (ref 136–145)
TOTAL PROTEIN: 5.5 G/DL (ref 6.4–8.2)
WBC # BLD: 7.5 K/UL (ref 4–11)

## 2022-11-14 PROCEDURE — 80053 COMPREHEN METABOLIC PANEL: CPT

## 2022-11-14 PROCEDURE — 2060000000 HC ICU INTERMEDIATE R&B

## 2022-11-14 PROCEDURE — 2580000003 HC RX 258: Performed by: NURSE PRACTITIONER

## 2022-11-14 PROCEDURE — 6370000000 HC RX 637 (ALT 250 FOR IP): Performed by: INTERNAL MEDICINE

## 2022-11-14 PROCEDURE — 84100 ASSAY OF PHOSPHORUS: CPT

## 2022-11-14 PROCEDURE — 85025 COMPLETE CBC W/AUTO DIFF WBC: CPT

## 2022-11-14 PROCEDURE — 6370000000 HC RX 637 (ALT 250 FOR IP): Performed by: NURSE PRACTITIONER

## 2022-11-14 PROCEDURE — 85520 HEPARIN ASSAY: CPT

## 2022-11-14 PROCEDURE — 36415 COLL VENOUS BLD VENIPUNCTURE: CPT

## 2022-11-14 PROCEDURE — 2580000003 HC RX 258: Performed by: INTERNAL MEDICINE

## 2022-11-14 PROCEDURE — 6370000000 HC RX 637 (ALT 250 FOR IP): Performed by: PHYSICIAN ASSISTANT

## 2022-11-14 PROCEDURE — 99233 SBSQ HOSP IP/OBS HIGH 50: CPT | Performed by: INTERNAL MEDICINE

## 2022-11-14 PROCEDURE — 83735 ASSAY OF MAGNESIUM: CPT

## 2022-11-14 RX ORDER — POTASSIUM CHLORIDE 20 MEQ/1
40 TABLET, EXTENDED RELEASE ORAL ONCE
Status: COMPLETED | OUTPATIENT
Start: 2022-11-14 | End: 2022-11-14

## 2022-11-14 RX ORDER — LOPERAMIDE HYDROCHLORIDE 2 MG/1
2 CAPSULE ORAL 4 TIMES DAILY PRN
Status: DISCONTINUED | OUTPATIENT
Start: 2022-11-14 | End: 2022-11-16 | Stop reason: HOSPADM

## 2022-11-14 RX ORDER — SODIUM CHLORIDE 9 MG/ML
INJECTION, SOLUTION INTRAVENOUS CONTINUOUS
Status: DISCONTINUED | OUTPATIENT
Start: 2022-11-14 | End: 2022-11-15

## 2022-11-14 RX ADMIN — Medication 125 MG: at 23:53

## 2022-11-14 RX ADMIN — CARVEDILOL 25 MG: 25 TABLET, FILM COATED ORAL at 17:23

## 2022-11-14 RX ADMIN — LOPERAMIDE HYDROCHLORIDE 2 MG: 2 CAPSULE ORAL at 20:04

## 2022-11-14 RX ADMIN — HYDROCODONE BITARTRATE AND ACETAMINOPHEN 1 TABLET: 5; 325 TABLET ORAL at 17:23

## 2022-11-14 RX ADMIN — ROSUVASTATIN CALCIUM 5 MG: 10 TABLET, FILM COATED ORAL at 20:04

## 2022-11-14 RX ADMIN — TOPIRAMATE 50 MG: 25 TABLET, FILM COATED ORAL at 20:04

## 2022-11-14 RX ADMIN — SODIUM CHLORIDE: 9 INJECTION, SOLUTION INTRAVENOUS at 10:52

## 2022-11-14 RX ADMIN — TOPIRAMATE 50 MG: 25 TABLET, FILM COATED ORAL at 08:55

## 2022-11-14 RX ADMIN — SODIUM CHLORIDE: 9 INJECTION, SOLUTION INTRAVENOUS at 20:07

## 2022-11-14 RX ADMIN — CARVEDILOL 25 MG: 25 TABLET, FILM COATED ORAL at 08:56

## 2022-11-14 RX ADMIN — SODIUM CHLORIDE, PRESERVATIVE FREE 10 ML: 5 INJECTION INTRAVENOUS at 08:58

## 2022-11-14 RX ADMIN — ASPIRIN 81 MG: 81 TABLET, COATED ORAL at 08:55

## 2022-11-14 RX ADMIN — CLOPIDOGREL BISULFATE 75 MG: 75 TABLET ORAL at 08:56

## 2022-11-14 RX ADMIN — LOPERAMIDE HYDROCHLORIDE 2 MG: 2 CAPSULE ORAL at 13:18

## 2022-11-14 RX ADMIN — ALLOPURINOL 100 MG: 100 TABLET ORAL at 08:55

## 2022-11-14 RX ADMIN — POTASSIUM CHLORIDE 40 MEQ: 1500 TABLET, EXTENDED RELEASE ORAL at 08:56

## 2022-11-14 RX ADMIN — MELATONIN TAB 3 MG 3 MG: 3 TAB at 20:11

## 2022-11-14 ASSESSMENT — PAIN SCALES - GENERAL
PAINLEVEL_OUTOF10: 0
PAINLEVEL_OUTOF10: 7
PAINLEVEL_OUTOF10: 0
PAINLEVEL_OUTOF10: 7

## 2022-11-14 ASSESSMENT — PAIN DESCRIPTION - LOCATION: LOCATION: BACK

## 2022-11-14 NOTE — CARE COORDINATION
Met with patient who plans to return home with her grandson when medically ready for discharge. Daughter lives nearby.

## 2022-11-14 NOTE — PROGRESS NOTES
[address]  Daily Progress Note    Cardiologist Macario Mack   Admit 11/11/2022   CC SOB, weakness, elevated trops   Subjective Pt appears to be resting comfortably. Denies CP and current SOB. Pt states SOB is \"every now and then\" and cannot remember if it correlates with activity. Wants to go home. Exam /63   Pulse 64   Temp 98 °F (36.7 °C) (Oral)   Resp 18   Ht 5' 3\" (1.6 m)   Wt 109 lb 6.4 oz (49.6 kg)   SpO2 99%   BMI 19.38 kg/m²    Intake/Output Summary (Last 24 hours) at 11/14/2022 0825  Last data filed at 11/14/2022 0343  Gross per 24 hour   Intake 394.04 ml   Output 200 ml   Net 194.04 ml     Gen Alert, coop, no distress Heart  Rrr, 1/6   Head NC, AT, no abnorm Abd  Soft, NT, +BS, no mass, no OM   Eyes PER, conj/corn clear Ext  Ext nl, AT, no C/C/E   Nose Nares nl, no drain, NT Pulse 2+ and symmetric   Throat Lips, mucosa, tongue nl Skin Col/text/turg nl, no vis rash/les   Neck S/S, TM, NT, no bruit/JVD Psych Nl mood and affect   Lung CTA-B, unlabored, no DTP Lymph   No cervical or axillary LA   Ch wall NT, no deform Neuro  Nl gross M/S exam      Medications  potassium chloride  40 mEq Oral Once    allopurinol  100 mg Oral Daily    aspirin EC  81 mg Oral Daily    carvedilol  25 mg Oral BID WC    levothyroxine  125 mcg Oral Daily    rosuvastatin  5 mg Oral Nightly    topiramate  50 mg Oral BID    sodium chloride flush  5-40 mL IntraVENous 2 times per day    clopidogrel  75 mg Oral Daily      sodium chloride      heparin (PORCINE) Infusion 20 Units/kg/hr (11/13/22 0620)      Labs Relevant and available CV data reviewed   Telemetry Reviewed   Time More than 35 minutes spent reviewing patient chart (including but not limited to notes, labs, imaging and other testing), interviewing patient and/or family members, performing a physical exam, documentation of my findings above and subsequent follow-up of ordered testing.   More than 50% of that time spent face to face with patient discussing clinical condition and counseling regarding treatment plan. Assessment and Plan  *CAD    Date EF Results   Sx 1987   CABGx3   [facility] 2/13  11/14  2/17     8/18  3/21  9/22       60%     N/A  N/A PCI D1  PCI D1 instent stenosis  % prox, S-%, % prox, D1 stent patent, LIMA to LAD  Angioplasty of high-grade proximal RCA lesion proximal Umanzor Nabil)  No intervention Dimamdadi Hickey)  No intervention Dima Hickey) ([facility])   MPI 8/22 44% Small-medium sized anterolateral partial reversibility defect of moderate intensity consistent with ischemia and infarction in the territory of the mid and distal LAD and/or LCx . 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

## 2022-11-14 NOTE — PROGRESS NOTES
Urology Progress Note  [facility]    Provider: TIFFANIE Garcia CNP  Patient ID:  Admission Date: 2022 Name: Ifeoma Meza Date: 2022 MRN: [id number]   Patient Location: [id number] : 1940  Attending: Maria L Knapp MD Date of Service: 2022  PCP: Sid Coles MD     Diagnoses:  1. NSTEMI (non-ST elevated myocardial infarction) ([address]       Pt here for cp and cards issues. Found to be in retention. Zavala for 700. Clear urine. Denies prior gu hx or prior retention. Creat 1.6.   ct sept showed nl kidneys x few cysts     Assessment/Plan:  Retention  Renal insuff  Cardiac issues  VT yesterday  -PVR 0cc's, voiding volitionally  Cr 1.8 from 1.6     Recc  Continue to monitor for retention  Bladder scan for PVR today  Str cath prn bl scan over 550  Will follow    The patient had a chance to ask questions which were answered. she understands the above plan. Subjective:   Dereje Becerril is a 80 y.o. female. She was seen and examined this morning. Today we discussed bladder scan and CIC. She is voiding volitionally. Does not complain of urinary symptoms. n     Objective:   Vitals:  Vitals:    22 0717   BP: 115/63   Pulse: 64   Resp: 18   Temp: 98 °F (36.7 °C)   SpO2: 99%       Intake/Output Summary (Last 24 hours) at 2022 1000  Last data filed at 2022 0343  Gross per 24 hour   Intake 394.04 ml   Output 200 ml   Net 194.04 ml     Physical Exam:  Gen: Alert and oriented x3, no acute distress  CV: Regular rate   Resp: unlabored respirations  Abd: Soft, non-distended, non-tender, no masses  Ext: no peripheral edema noted, moves upper and lower extremities spontaneously  Skin: warmand well perfused, no rashes noted on the face, or arms.      Labs:  Lab Results   Component Value Date    WBC 7.5 2022    HGB 12.0 2022    HCT 38.7 2022    MCV 80.3 2022     2022     Lab Results   Component Value Date    CREATININE 1.8 (H) 11/14/2022    BUN 52 (H) 11/14/2022     11/14/2022    K 3.3 (L) 11/14/2022     11/14/2022    CO2 22 11/14/2022       Citlalli Mckeon, APRN - CNP   11/14/2022

## 2022-11-14 NOTE — PLAN OF CARE
Problem: Pain  Goal: Verbalizes/displays adequate comfort level or baseline comfort level  11/14/2022 1019 by Milton Booth RN  Outcome: Progressing  Flowsheets (Taken 11/14/2022 1019)  Verbalizes/displays adequate comfort level or baseline comfort level: Assess pain using appropriate pain scale  Note: Pt denies pain at this time. No acute distress noted. Will continue to assess.    11/14/2022 0222 by Vida Lal RN  Outcome: Progressing

## 2022-11-14 NOTE — PROGRESS NOTES
Hospitalist Progress Note      PCP: Richa Christianson MD    Date of Admission: 11/11/2022    Chief Complaint: Shortness of breath    Hospital Course: Glenroy Mott is a 80 y.o. female with history of A. fib, s/p watchman implanted 5/17/2021, TIA, chronic systolic CHF, CAD with recent drug-eluting stent x2 on 10/19/2022, CABG in 87, Hypertension, HLD, and CKD 3. 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 She presented to ER for shortness of breath. In the ED troponin was elevated at 0.18 and EKG without acute ST-T changes. She was started on heparin drip and Lasix. Subjective: Patient seen and examined. Denies any chest pain or dyspnea. Scr 1.8 today. Cath cancelled. Patient complaining of chronic diarrhea.       Medications:  Reviewed    Infusion Medications    sodium chloride 100 mL/hr at 11/14/22 1052    sodium chloride      heparin (PORCINE) Infusion 20 Units/kg/hr (11/13/22 0894)     Scheduled Medications    allopurinol  100 mg Oral Daily    aspirin EC  81 mg Oral Daily    carvedilol  25 mg Oral BID WC    levothyroxine  125 mcg Oral Daily    rosuvastatin  5 mg Oral Nightly    topiramate  50 mg Oral BID    sodium chloride flush  5-40 mL IntraVENous 2 times per day    clopidogrel  75 mg Oral Daily     PRN Meds: loperamide, melatonin, albuterol sulfate HFA, HYDROcodone-acetaminophen, sodium chloride flush, sodium chloride, ondansetron **OR** ondansetron, polyethylene glycol, acetaminophen **OR** acetaminophen, perflutren lipid microspheres, heparin (porcine), heparin (porcine), nitroGLYCERIN      Intake/Output Summary (Last 24 hours) at 11/14/2022 1247  Last data filed at 11/14/2022 0343  Gross per 24 hour   Intake 394.04 ml   Output 200 ml   Net 194.04 ml       Physical Exam Performed:    /75   Pulse 70   Temp 97.4 °F (36.3 °C) (Oral)   Resp 18   Ht 5' 3\" (1.6 m)   Wt 109 lb 6.4 oz (49.6 kg)   SpO2 100%   BMI 19.38 kg/m² General appearance: Elderly and frail, no apparent distress, appears stated age and cooperative. HEENT: Pupils equal, round, and reactive to light. Conjunctivae clear. Neck: Supple, with full range of motion. No jugular venous distention. Trachea midline. Respiratory:  Normal respiratory effort. Decreased air entry at the bases with Rales. Cardiovascular: Regular rate and rhythm with normal S1/S2 without murmurs, rubs or gallops. Abdomen: Soft, non-tender, non-distended with normal bowel sounds. Musculoskeletal: No clubbing, cyanosis or edema bilaterally. Full range of motion without deformity. Skin: Skin color, texture, turgor normal.  No rashes or lesions. Neurologic:  Neurovascularly intact without any focal sensory/motor deficits.  Cranial nerves: II-XII intact, grossly non-focal.  Psychiatric: Alert and oriented, thought content appropriate, normal insight  Capillary Refill: Brisk, 3 seconds, normal   Peripheral Pulses: +2 palpable, equal bilaterally       Labs:   Recent Labs     11/12/22 0433 11/13/22 0436 11/14/22  0539   WBC 18.0* 10.5 7.5   HGB 13.8 12.7 12.0   HCT 44.2 40.1 38.7    335 305     Recent Labs     11/12/22 0433 11/13/22 0436 11/14/22  0539    142 142   K 3.5 3.8 3.3*   * 111* 109   CO2 18* 19* 22   BUN 40* 48* 52*   CREATININE 1.3* 1.6* 1.8*   CALCIUM 9.1 8.8 8.9   PHOS  --  2.8 3.2     Recent Labs     11/12/22 0433 11/13/22 0436 11/14/22  0539   AST 17 17 14*   ALT 9* 8* 8*   BILITOT 0.5 0.3 0.3   ALKPHOS 87 75 67     Recent Labs     11/12/22  0235   INR 1.27*     Recent Labs     11/12/22  0433 11/12/22  1211 11/13/22  1215   TROPONINI 0.17* 0.11* 0.09*       Urinalysis:      Lab Results   Component Value Date/Time    NITRU Negative 11/12/2022 06:06 PM    WBCUA 2 11/12/2022 06:06 PM    BACTERIA None Seen 11/12/2022 06:06 PM    RBCUA 1 11/12/2022 06:06 PM    BLOODU TRACE 11/12/2022 06:06 PM    SPECGRAV 1.020 11/12/2022 06:06 PM    GLUCOSEU Negative 11/12/2022 06:06 PM    GLUCOSEU NEGATIVE 12/04/2011 06:40 PM       Radiology:  CT CHEST PULMONARY EMBOLISM W CONTRAST   Final Result   1. Motion limited evaluation with no evidence of acute pulmonary embolism to   the segmental level. 2. Pulmonary edema with small right and trace left pleural effusions. 3. Reflux of contrast into the hepatic veins can be seen in the setting of   right heart failure. 4. Unchanged enlargement of main pulmonary artery which can be seen in the   setting of pulmonary artery hypertension. 5. Suspected small increase in the size of ascending aortic ectasia which now   measures 4.2 cm and previously measured 3.8 cm. Consider nonemergent   vascular consultation. 6. Chronic findings outlined in body of the report. XR CHEST PORTABLE   Final Result   Severe cardiomegaly, with vascular congestive changes and mild interstitial   pulmonary edema. Assessment/Plan:    Active Hospital Problems    Diagnosis     Acute respiratory failure with hypoxia ([address] [J96.01]      Priority: Medium    NSTEMI (non-ST elevated myocardial infarction) ([address] [I21.4]     Chronic combined systolic (congestive) and diastolic (congestive) heart failure ([address] [I50.42]     Essential hypertension, benign [I10]        Acute on chronic combined systolic and diastolic HF:   Echo 2/63/4638: LVEF 50 to 60%. Currently requiring 2 L nasal cannula. Continue CHF protocol. IV Lasix discontinued. Repeat echocardiogram pending. NSTEMI:  Cardiology consulted: Appreciate input. Continue heparin drip, Crestor, Coreg, aspirin and Plavix. Cath held off due to GIANA. GIANA on CKD 3:   Scr worsening; 1.3 to 1.8. Nephrology consulted: Appreciate input. Monitor BMP off Lasix. Urinary Retention:  Bladder scan in the ED with ~700 cc of urine. Urinalysis negative for UTI. Urology consulted: Appreciate input. Zavala discontinued 11/3.  Bladder scan every 6 with CIC if PVR greater than 550 cc. Chronic diarrhea:  C. Difficile negative. GI PCR pathogens, ova and parasites, Fecal leukocytes, fecal calprotectin pending. Imodium if negative for infectious etiology. GI consult if persists. Paroxysmal A-fib: s/p watchman. Continue Coreg. Hyperlipidemia: Continue statin      Leukocytosis: Resolved. Most likely reactive. WBC 20.6K on admission trended down to 10.5K. UA negative for UTI. Pro calcitonin 0.29. ESR 12, CRP 87.7, resp viral panel negative. Remained afebrile and hemodynamically stable. CXR: Severe cardiomegaly with pulmonary vascular congestion/mild interstitial edema and.  CT chest without pneumonia. Monitored off antibiotics. Blood cultures ngtd. DVT Prophylaxis: On heparin drip  Diet: ADULT DIET; Regular;  Low Sodium (2 gm)  Code Status: Full Code  PT/OT Eval Status: Requested    Dispo -once medically stable    Christiano Nassar MD

## 2022-11-14 NOTE — PLAN OF CARE
Problem: Discharge Planning  Goal: Discharge to home or other facility with appropriate resources  Outcome: Progressing     Problem: Pain  Goal: Verbalizes/displays adequate comfort level or baseline comfort level  Outcome: Progressing     Problem: Skin/Tissue Integrity  Goal: Absence of new skin breakdown  Description: 1. Monitor for areas of redness and/or skin breakdown  2. Assess vascular access sites hourly  3. Every 4-6 hours minimum:  Change oxygen saturation probe site  4. Every 4-6 hours:  If on nasal continuous positive airway pressure, respiratory therapy assess nares and determine need for appliance change or resting period.   Outcome: Progressing     Problem: Safety - Adult  Goal: Free from fall injury  Outcome: Progressing     Problem: ABCDS Injury Assessment  Goal: Absence of physical injury  Outcome: Progressing     Problem: Neurosensory - Adult  Goal: Achieves stable or improved neurological status  Outcome: Progressing     Problem: Respiratory - Adult  Goal: Achieves optimal ventilation and oxygenation  Outcome: Progressing     Problem: Cardiovascular - Adult  Goal: Maintains optimal cardiac output and hemodynamic stability  Outcome: Progressing     Problem: Skin/Tissue Integrity - Adult  Goal: Skin integrity remains intact  Outcome: Progressing     Problem: Genitourinary - Adult  Goal: Absence of urinary retention  Outcome: Progressing  Goal: Urinary catheter remains patent  Outcome: Progressing     Problem: Musculoskeletal - Adult  Goal: Return mobility to safest level of function  Outcome: Progressing     Problem: Infection - Adult  Goal: Absence of infection at discharge  Outcome: Progressing     Problem: Metabolic/Fluid and Electrolytes - Adult  Goal: Electrolytes maintained within normal limits  Outcome: Progressing  Goal: Hemodynamic stability and optimal renal function maintained  Outcome: Progressing

## 2022-11-15 LAB
A/G RATIO: 1.7 (ref 1.1–2.2)
ALBUMIN SERPL-MCNC: 3.5 G/DL (ref 3.4–5)
ALP BLD-CCNC: 71 U/L (ref 40–129)
ALT SERPL-CCNC: 8 U/L (ref 10–40)
ANION GAP SERPL CALCULATED.3IONS-SCNC: 9 MMOL/L (ref 3–16)
ANTI-XA UNFRAC HEPARIN: 0.28 IU/ML (ref 0.3–0.7)
ANTI-XA UNFRAC HEPARIN: 0.29 IU/ML (ref 0.3–0.7)
ANTI-XA UNFRAC HEPARIN: 0.4 IU/ML (ref 0.3–0.7)
AST SERPL-CCNC: 15 U/L (ref 15–37)
BASOPHILS ABSOLUTE: 0.1 K/UL (ref 0–0.2)
BASOPHILS RELATIVE PERCENT: 1.2 %
BILIRUB SERPL-MCNC: <0.2 MG/DL (ref 0–1)
BLOOD CULTURE, ROUTINE: NORMAL
BUN BLDV-MCNC: 40 MG/DL (ref 7–20)
CALCIUM SERPL-MCNC: 8.5 MG/DL (ref 8.3–10.6)
CHLORIDE BLD-SCNC: 112 MMOL/L (ref 99–110)
CO2: 22 MMOL/L (ref 21–32)
CREAT SERPL-MCNC: 1.4 MG/DL (ref 0.6–1.2)
CULTURE, BLOOD 2: NORMAL
EOSINOPHILS ABSOLUTE: 0.3 K/UL (ref 0–0.6)
EOSINOPHILS RELATIVE PERCENT: 5.2 %
GFR SERPL CREATININE-BSD FRML MDRD: 38 ML/MIN/{1.73_M2}
GLUCOSE BLD-MCNC: 92 MG/DL (ref 70–99)
HCT VFR BLD CALC: 39.7 % (ref 36–48)
HEMOGLOBIN: 11.9 G/DL (ref 12–16)
LV EF: 48 %
LVEF MODALITY: NORMAL
LYMPHOCYTES ABSOLUTE: 1.1 K/UL (ref 1–5.1)
LYMPHOCYTES RELATIVE PERCENT: 16.4 %
MAGNESIUM: 2.1 MG/DL (ref 1.8–2.4)
MCH RBC QN AUTO: 24.7 PG (ref 26–34)
MCHC RBC AUTO-ENTMCNC: 30.1 G/DL (ref 31–36)
MCV RBC AUTO: 82.2 FL (ref 80–100)
MONOCYTES ABSOLUTE: 0.3 K/UL (ref 0–1.3)
MONOCYTES RELATIVE PERCENT: 4.6 %
NEUTROPHILS ABSOLUTE: 4.7 K/UL (ref 1.7–7.7)
NEUTROPHILS RELATIVE PERCENT: 72.6 %
PDW BLD-RTO: 19.9 % (ref 12.4–15.4)
PHOSPHORUS: 2.8 MG/DL (ref 2.5–4.9)
PLATELET # BLD: 302 K/UL (ref 135–450)
PMV BLD AUTO: 9.1 FL (ref 5–10.5)
POTASSIUM SERPL-SCNC: 4.2 MMOL/L (ref 3.5–5.1)
RBC # BLD: 4.83 M/UL (ref 4–5.2)
SODIUM BLD-SCNC: 143 MMOL/L (ref 136–145)
TOTAL PROTEIN: 5.6 G/DL (ref 6.4–8.2)
WBC # BLD: 6.4 K/UL (ref 4–11)

## 2022-11-15 PROCEDURE — 6360000002 HC RX W HCPCS: Performed by: NURSE PRACTITIONER

## 2022-11-15 PROCEDURE — 6370000000 HC RX 637 (ALT 250 FOR IP): Performed by: INTERNAL MEDICINE

## 2022-11-15 PROCEDURE — 85025 COMPLETE CBC W/AUTO DIFF WBC: CPT

## 2022-11-15 PROCEDURE — 2060000000 HC ICU INTERMEDIATE R&B

## 2022-11-15 PROCEDURE — 85520 HEPARIN ASSAY: CPT

## 2022-11-15 PROCEDURE — 84100 ASSAY OF PHOSPHORUS: CPT

## 2022-11-15 PROCEDURE — 2580000003 HC RX 258: Performed by: NURSE PRACTITIONER

## 2022-11-15 PROCEDURE — 2580000003 HC RX 258: Performed by: INTERNAL MEDICINE

## 2022-11-15 PROCEDURE — 6370000000 HC RX 637 (ALT 250 FOR IP): Performed by: PHYSICIAN ASSISTANT

## 2022-11-15 PROCEDURE — 94760 N-INVAS EAR/PLS OXIMETRY 1: CPT

## 2022-11-15 PROCEDURE — 36415 COLL VENOUS BLD VENIPUNCTURE: CPT

## 2022-11-15 PROCEDURE — 6370000000 HC RX 637 (ALT 250 FOR IP): Performed by: NURSE PRACTITIONER

## 2022-11-15 PROCEDURE — 83735 ASSAY OF MAGNESIUM: CPT

## 2022-11-15 PROCEDURE — 80053 COMPREHEN METABOLIC PANEL: CPT

## 2022-11-15 PROCEDURE — 93306 TTE W/DOPPLER COMPLETE: CPT

## 2022-11-15 RX ADMIN — CARVEDILOL 25 MG: 25 TABLET, FILM COATED ORAL at 17:06

## 2022-11-15 RX ADMIN — MELATONIN TAB 3 MG 3 MG: 3 TAB at 20:26

## 2022-11-15 RX ADMIN — Medication 125 MG: at 05:58

## 2022-11-15 RX ADMIN — TOPIRAMATE 50 MG: 25 TABLET, FILM COATED ORAL at 20:26

## 2022-11-15 RX ADMIN — ALLOPURINOL 100 MG: 100 TABLET ORAL at 08:40

## 2022-11-15 RX ADMIN — HEPARIN SODIUM 22 UNITS/KG/HR: 10000 INJECTION, SOLUTION INTRAVENOUS at 07:38

## 2022-11-15 RX ADMIN — HEPARIN SODIUM 1500 UNITS: 1000 INJECTION INTRAVENOUS; SUBCUTANEOUS at 22:05

## 2022-11-15 RX ADMIN — ROSUVASTATIN CALCIUM 5 MG: 10 TABLET, FILM COATED ORAL at 20:26

## 2022-11-15 RX ADMIN — TOPIRAMATE 50 MG: 25 TABLET, FILM COATED ORAL at 08:40

## 2022-11-15 RX ADMIN — HEPARIN SODIUM 24 UNITS/KG/HR: 10000 INJECTION, SOLUTION INTRAVENOUS at 22:07

## 2022-11-15 RX ADMIN — LOPERAMIDE HYDROCHLORIDE 2 MG: 2 CAPSULE ORAL at 20:26

## 2022-11-15 RX ADMIN — Medication 125 MG: at 17:08

## 2022-11-15 RX ADMIN — CLOPIDOGREL BISULFATE 75 MG: 75 TABLET ORAL at 08:40

## 2022-11-15 RX ADMIN — HEPARIN SODIUM 1500 UNITS: 1000 INJECTION INTRAVENOUS; SUBCUTANEOUS at 07:34

## 2022-11-15 RX ADMIN — Medication 125 MG: at 11:57

## 2022-11-15 RX ADMIN — HEPARIN SODIUM 19.96 UNITS/KG/HR: 10000 INJECTION, SOLUTION INTRAVENOUS at 00:00

## 2022-11-15 RX ADMIN — SODIUM CHLORIDE: 9 INJECTION, SOLUTION INTRAVENOUS at 05:57

## 2022-11-15 RX ADMIN — Medication 125 MG: at 23:36

## 2022-11-15 RX ADMIN — ASPIRIN 81 MG: 81 TABLET, COATED ORAL at 08:40

## 2022-11-15 RX ADMIN — CARVEDILOL 25 MG: 25 TABLET, FILM COATED ORAL at 08:40

## 2022-11-15 RX ADMIN — SODIUM CHLORIDE, PRESERVATIVE FREE 10 ML: 5 INJECTION INTRAVENOUS at 08:42

## 2022-11-15 RX ADMIN — LEVOTHYROXINE SODIUM 125 MCG: 0.03 TABLET ORAL at 05:58

## 2022-11-15 ASSESSMENT — PAIN SCALES - GENERAL
PAINLEVEL_OUTOF10: 0

## 2022-11-15 NOTE — PROGRESS NOTES
Urology Progress Note  [facility]    Provider: TIFFANIE Treviño CNP  Patient ID:  Admission Date: 2022 Name: Kiara Console Date: 2022 MRN: [id number]   Patient Location: [id number] : 1940  Attending: Smith Kennedy MD Date of Service: 11/15/2022  PCP: Mala Prater MD     Diagnoses:  1. NSTEMI (non-ST elevated myocardial infarction) ([address]       Pt here for cp and cards issues. Found to be in retention. Zavala for 700. Clear urine. Denies prior gu hx or prior retention. Creat 1.6.   ct sept showed nl kidneys x few cysts     Assessment/Plan:  Retention  Renal insuff  Cardiac issues  VT yesterday  -PVR 0cc's, voiding volitionally  Cr 1.4 from 1.8     Recc  Continue to monitor for retention  Bladder scan PRN  Str cath prn bl scan over 550  Pvr is 0cc's today  Will continue to follow peripherally. The patient had a chance to ask questions which were answered. she understands the above plan. Subjective:   Elmer Dowd is a 80 y.o. female. She was seen and examined this morning. Today we discussed bladder scan and CIC. She is voiding volitionally. Does not complain of urinary symptoms. n     Objective:   Vitals:  Vitals:    11/15/22 1151   BP: 138/75   Pulse: 71   Resp: 18   Temp: 97.3 °F (36.3 °C)   SpO2: 100%       Intake/Output Summary (Last 24 hours) at 11/15/2022 1201  Last data filed at 11/15/2022 0215  Gross per 24 hour   Intake 1984.05 ml   Output --   Net 1984.05 ml       Physical Exam:  Gen: Alert and oriented x3, no acute distress  Skin: warmand well perfused, no rashes noted on the face, or arms.      Labs:  Lab Results   Component Value Date    WBC 6.4 11/15/2022    HGB 11.9 (L) 11/15/2022    HCT 39.7 11/15/2022    MCV 82.2 11/15/2022     11/15/2022     Lab Results   Component Value Date    CREATININE 1.4 (H) 11/15/2022    BUN 40 (H) 11/15/2022     11/15/2022    K 4.2 11/15/2022     (H) 11/15/2022    CO2 22 11/15/2022 Caitlyn Kendall, TIFFANIE - CNP   11/15/2022

## 2022-11-15 NOTE — PROGRESS NOTES
MD Toi Donahue MD Assunta Perl, MD                  Office: [phone]                      Fax: [phone]             [facility]                   NEPHROLOGY INPATIENT PROGRESS NOTE:     PATIENT NAME: Rachell Koenig  : 1940  MRN: [id number]      RECOMMENDATIONS:     - Stopped Lasix 40 iv BID -   - fup w/ cardiology     - bills insertion needed for ARU   - urology consulted - \" Can dc bills , Str cath prn bl scan over 550 , Will follow     - recheck UA now  to fup on hematuria on UA    - can plans for LHC if needed, as lower risk for CI-GIANA today    - c.diff mx ongoing       D/C plan from renal stand point: improving,   Continue to fup w/ in outpt   D/w team nurse hospitalist   D/W patient, pt's daughter too     IMPRESSION:       Admitted on:  2022  5:07 PM   For:  NSTEMI (non-ST elevated myocardial infarction) ([address] [I21.4]  Acute respiratory failure with hypoxia ([address] [J96.01]      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 Weight in past around 116-119 pounds,   Lowest 113 lbs  Now - 116 lbs in office   At home - ~116 lbs  Now inpatient: 110 lbs     : HTN : no need for tight control   : Na: WNL    - Azotemia: pre-renal  - Electrolytes: K: WNL  Mag WNL  - Acid-Base: acidosis - non AGMA   - Anemia: no         Other major problems: Management per primary and other consulting teams.          Hospital Problems             Last Modified POA    * (Principal) Acute respiratory failure with hypoxia ([address] 2022 Yes    Essential hypertension, benign 2022 Yes    Chronic combined systolic (congestive) and diastolic (congestive) heart failure ([facility] 11/12/2022 Yes    NSTEMI (non-ST elevated myocardial infarction) ([facility] 11/12/2022 Yes   : other supportive care :   - Check daily renal function panel with electrolytes-phosphorus  - Strict monitoring of I/Os, daily weight  - non-Renal feeds/diet  - Current medications reviewed. Please refer to the orders. High Complexity. Multiple complex problems. Discussed with patient and treatment team-    Time spent > 30 (~35) minutes. Thank you for allowing me to participate in this patient's care. Please do not hesitate to contact me anytime. We will follow along with you. Josefa Bright MD,  [facility] [address] [place] [address] [place]: [phone] or Via IV[employer]k  Fax: [phone]        =======================================================================================   =======================================================================================  Subjective / interval history:   Patient was seen comfortably  in the bed,   Reported no active complaints,   No hypoxia  BP stable   Renal function better   I/O, weights reviewed     No current active complaints. Patient denied chest pain / dizziness/lightheadedness/syncope/ SOB / leg edema. .      Past medical, Surgical, Social, Family medical history reviewed by me.      PAST MEDICAL HISTORY:   Past Medical History:   Diagnosis Date    Allergic rhinitis, cause unspecified     Arthritis     Atrial fibrillation (HCC)     Bronchopneumonia     CAD (coronary artery disease)     stent:  post cataract surgery (CABG)    Cerebral artery occlusion with cerebral infarction (HCC)     TIA    Chronic gouty arthropathy     Chronic kidney disease     Congestive heart failure, unspecified     Degeneration of cervical intervertebral disc     Essential and other specified forms of tremor     Gout     HIGH CHOLESTEROL     History of CVA (cerebrovascular accident)     Hx of blood clots Hypertension     Influenza 12/23/2017    Leakage of Watchman left atrial appendage closure device     Mitral valve stenosis and aortic valve insufficiency     Movement disorder     back problems    Pacemaker     Peptic ulcer, unspecified site, unspecified as acute or chronic, without mention of hemorrhage, perforation, or obstruction     Thyroid disease     Unspecified disorder of kidney and ureter     Unspecified hypothyroidism      PAST SURGICAL HISTORY:   Past Surgical History:   Procedure Laterality Date    BACK SURGERY      CARDIAC CATHETERIZATION  7/2012    CARDIAC CATHETERIZATION  08/07/2018    Unsuccesful  of RCA    CARDIAC SURGERY      CABG & Cardiac ablation    CHOLECYSTECTOMY      CORONARY ARTERY BYPASS GRAFT  1987    LIMA- Diag/LAD, SVG- RCA    FEMORAL BYPASS Left 8/22/2019    LEFT FEMORAL TO POPLITEAL BYPASS GRAFT performed by Yissel Sutton MD at [facility] GRAFT N/A 8/22/2019    FEMORAL TO FEMORAL BYPASS performed by Yissel Sutton MD at [address] Left 03/16/2017    Left hip pinning    IR KYPHOPLASTY LUMBAR FIRST LEVEL  5/14/2021    IR KYPHOPLASTY LUMBAR FIRST LEVEL 5/14/2021 [facility] SPECIAL PROCEDURES    JOINT REPLACEMENT      OR NJX AA&/STRD TFRML EPI LUMBAR/SACRAL 1 LEVEL Right 12/3/2018    RIGHT L3 AND L4 LUMBAR TRANSFORAMINAL EPIRUAL STEROID INJECTION WITH FLUOROSCOPY performed by Fe Montalvo MD at [facility]    PTCA  11/04/2014    MARLENY - 3.0 x 28 to the Ist Diag    SHOULDER SURGERY      left     FAMILY HISTORY:   Family History   Problem Relation Age of Onset    Cancer Sister     Heart Disease Sister     Diabetes Brother     Hypertension Brother     Heart Disease Brother     Stroke Maternal Aunt     Heart Disease Maternal Aunt     Diabetes Maternal Uncle     Hypertension Paternal Aunt     Cancer Maternal Grandmother     Cancer Paternal Grandmother     Rheum Arthritis Neg Hx     Lupus Neg Hx      SOCIAL HISTORY:   Social History     Socioeconomic History    Marital status:      Spouse name: Jasper Hudson    Number of children: 3    Years of education: 12    Highest education level: None   Tobacco Use    Smoking status: Former     Packs/day: 1.00     Years: 28.00     Pack years: 28.00     Types: Cigarettes     Quit date: 1987     Years since quittin.8    Smokeless tobacco: Never    Tobacco comments:     H.O.smoking at age 15 / smoked up to 1 p.p.d / quit    Vaping Use    Vaping Use: Never used   Substance and Sexual Activity    Alcohol use: Yes     Comment: social    Drug use: No    Sexual activity: Not Currently     Social Determinants of Health     Financial Resource Strain: Low Risk     Difficulty of Paying Living Expenses: Not hard at all   Food Insecurity: No Food Insecurity    Worried About [address] in the Last Year: Never true    [address] in the Last Year: Never true   Transportation Needs: No Transportation Needs    Lack of Transportation (Medical): No    Lack of Transportation (Non-Medical): No   Physical Activity: Inactive    Days of Exercise per Week: 0 days    Minutes of Exercise per Session: 0 min   Stress: No Stress Concern Present    Feeling of Stress : Not at all   Social Connections: Socially Isolated    Frequency of Communication with Friends and Family: Three times a week    Frequency of Social Gatherings with Friends and Family: Three times a week    Attends [employer] Services: Never    Active Member of Clubs or Organizations: No    Attends Club or Organization Meetings: Never    Marital Status:    Housing Stability: Low Risk     Unable to Pay for Housing in the Last Year: No    Number of Places Lived in the Last Year: 1    Unstable Housing in the Last Year: No         MEDICATIONS: reviewed by me. Medications Prior to Admission:  No current facility-administered medications on file prior to encounter.      Current Outpatient Medications on File Prior to Encounter   Medication Sig Dispense Refill HYDROcodone-acetaminophen (NORCO) 5-325 MG per tablet Take 1 tablet by mouth 4 times daily as needed for Pain for up to 30 days. 120 tablet 0    carvedilol (COREG) 25 MG tablet Take 1 tablet by mouth 2 times daily (with meals) 180 tablet 3    rosuvastatin (CRESTOR) 5 MG tablet Take 1 tablet by mouth nightly 90 tablet 1    levothyroxine (SYNTHROID) 125 MCG tablet Take 1 tablet by mouth Daily 90 tablet 0    aspirin EC 81 MG EC tablet Take 1 tablet by mouth daily 90 tablet 1    topiramate (TOPAMAX) 50 MG tablet TAKE 2 TABLETS TWICE DAILY 360 tablet 0    torsemide (DEMADEX) 20 MG tablet 10 mg on MWF 30 tablet 3    albuterol sulfate HFA (VENTOLIN HFA) 108 (90 Base) MCG/ACT inhaler Inhale 2 puffs into the lungs 4 times daily as needed for Wheezing 18 g 0    vitamin D (ERGOCALCIFEROL) 1.25 MG (71520 UT) CAPS capsule TAKE 1 CAPSULE ONCE A WEEK 12 capsule 1    clopidogrel (PLAVIX) 75 MG tablet Take 1 tablet by mouth in the morning.  30 tablet 3    allopurinol (ZYLOPRIM) 100 MG tablet TAKE 1 TABLET EVERY DAY 90 tablet 1    nitroGLYCERIN (NITROLINGUAL) 0.4 MG/SPRAY 0.4 mg spray Place 1 spray under the tongue every 5 minutes as needed for Chest pain 4.9 g 1    fluticasone (FLONASE) 50 MCG/ACT nasal spray 2 sprays by Each Nostril route daily 16 g 0         Current Facility-Administered Medications:     0.9 % sodium chloride infusion, , IntraVENous, Continuous, Malia Alexander MD, Last Rate: 100 mL/hr at 11/15/22 0557, New Bag at 11/15/22 0557    loperamide (IMODIUM) capsule 2 mg, 2 mg, Oral, 4x Daily PRN, Shayna Santana MD, 2 mg at 11/14/22 2004    vancomycin [facility]) 50 MG/ML oral solution 125 mg, 125 mg, Oral, 4 times per day, MAYNOR Smith-C, 125 mg at 11/15/22 0558    melatonin tablet 3 mg, 3 mg, Oral, Nightly PRN, Elsie Gannon, APRN - CNP, 3 mg at 11/14/22 2011    albuterol sulfate HFA (PROVENTIL;VENTOLIN;PROAIR) 108 (90 Base) MCG/ACT inhaler 2 puff, 2 puff, Inhalation, 4x Daily PRN, TIFFANIE Charles - CNP    allopurinol (ZYLOPRIM) tablet 100 mg, 100 mg, Oral, Daily, Elsie Jagdeep, APRN - CNP, 100 mg at 11/15/22 0840    aspirin EC tablet 81 mg, 81 mg, Oral, Daily, Elsie Jagdeep, APRN - CNP, 81 mg at 11/15/22 0840    carvedilol (COREG) tablet 25 mg, 25 mg, Oral, BID WC, Elsie Gannon, APRN - CNP, 25 mg at 11/15/22 0840    HYDROcodone-acetaminophen ([facility]) 5-325 MG per tablet 1 tablet, 1 tablet, Oral, 4x Daily PRN, Elsie Gannon, APRN - CNP, 1 tablet at 11/14/22 1723    levothyroxine (SYNTHROID) tablet 125 mcg, 125 mcg, Oral, Daily, Tim Alex, APRN - CNP, 125 mcg at 11/15/22 0558    rosuvastatin (CRESTOR) tablet 5 mg, 5 mg, Oral, Nightly, Elsie Gannon, APRN - CNP, 5 mg at 11/14/22 2004    topiramate (TOPAMAX) tablet 50 mg, 50 mg, Oral, BID, Elsie Gannon, APRN - CNP, 50 mg at 11/15/22 0840    sodium chloride flush 0.9 % injection 5-40 mL, 5-40 mL, IntraVENous, 2 times per day, Tim Alex, APRN - CNP, 10 mL at 11/15/22 0842    sodium chloride flush 0.9 % injection 5-40 mL, 5-40 mL, IntraVENous, PRN, Tim Johnson, APRN - CNP, 10 mL at 11/12/22 2154    0.9 % sodium chloride infusion, , IntraVENous, PRN, Elsie Gannon, APRN - CNP    ondansetron (ZOFRAN-ODT) disintegrating tablet 4 mg, 4 mg, Oral, Q8H PRN **OR** ondansetron (ZOFRAN) injection 4 mg, 4 mg, IntraVENous, Q6H PRN, Elsie Gannon, APRN - CNP    polyethylene glycol (GLYCOLAX) packet 17 g, 17 g, Oral, Daily PRN, Elsie Gannon, APRN - CNP, 17 g at 11/12/22 0559    acetaminophen (TYLENOL) tablet 650 mg, 650 mg, Oral, Q6H PRN **OR** acetaminophen (TYLENOL) suppository 650 mg, 650 mg, Rectal, Q6H PRN, Elsie Minick, APRN - CNP    perflutren lipid microspheres (DEFINITY) injection 1.5 mL, 1.5 mL, IntraVENous, ONCE PRN, Elsie Minick, APRN - CNP    heparin (porcine) injection 3,010 Units, 60 Units/kg, IntraVENous, PRN, Elsie Minick, APRN - CNP, 3,010 Units at 11/12/22 1420    heparin (porcine) injection 1,500 Units, 30 Units/kg, IntraVENous, PRN, TIFFANIE Mensah CNP, 1,500 Units at 11/15/22 0734    heparin 25,000 units in dextrose 5% 250 mL (premix) infusion, 5-30 Units/kg/hr, IntraVENous, Continuous, TIFFANIE Welch CNP, Last Rate: 11 mL/hr at 11/15/22 0738, 22 Units/kg/hr at 11/15/22 0738    clopidogrel (PLAVIX) tablet 75 mg, 75 mg, Oral, Daily, TIFFANIE Welch CNP, 75 mg at 11/15/22 0840    nitroGLYCERIN (NITROSTAT) SL tablet 0.4 mg, 0.4 mg, SubLINGual, Q5 Min PRN, Marianne Gaxiola MD      Allergies reviewed by me: Aspirin, Diltiazem, Diltiazem hcl, Lorazepam, Sulfa antibiotics, Atorvastatin, Dabigatran, Mysoline [primidone], Nsaids, Other, and Primidone    REVIEW OF SYSTEMS:  As mentioned in chief complaint and HPI , Subjective   All other 10-point review of systems: negative.          =======================================================================================     PHYSICAL EXAM:  Recent vital signs and recent I/Os reviewed by me. Wt Readings from Last 3 Encounters:   11/15/22 110 lb 3.2 oz (50 kg)   11/07/22 114 lb 6.4 oz (51.9 kg)   10/28/22 116 lb (52.6 kg)     BP Readings from Last 3 Encounters:   11/15/22 108/67   11/07/22 120/70   10/28/22 122/70     Patient Vitals for the past 24 hrs:   BP Temp Temp src Pulse Resp SpO2 Weight   11/15/22 0730 108/67 97.5 °F (36.4 °C) Oral 77 18 97 % --   11/15/22 0702 -- -- -- -- 16 98 % --   11/15/22 0358 136/68 97.6 °F (36.4 °C) Oral 70 18 99 % 110 lb 3.2 oz (50 kg)   11/14/22 2348 101/63 98.1 °F (36.7 °C) Oral 75 16 98 % --   11/14/22 1855 98/62 97.4 °F (36.3 °C) Oral 80 18 97 % --   11/14/22 1721 128/67 97.4 °F (36.3 °C) Oral 71 18 99 % --   11/14/22 1230 -- 97.4 °F (36.3 °C) Oral -- -- -- --   11/14/22 1202 121/75 -- -- 70 18 100 % --         Intake/Output Summary (Last 24 hours) at 11/15/2022 1131  Last data filed at 11/15/2022 0215  Gross per 24 hour   Intake 1984.05 ml   Output --   Net 1984.05 ml             Physical Exam  Vitals reviewed.    Constitutional: General: She is not in acute distress. Appearance: Normal appearance. She is ill-appearing. HENT:      Head: Normocephalic and atraumatic. Right Ear: External ear normal.      Left Ear: External ear normal.      Nose: Nose normal.      Mouth/Throat:      Mouth: Mucous membranes are moist. Mucous membranes are not dry. Eyes:      General: No scleral icterus. Conjunctiva/sclera: Conjunctivae normal.   Neck:      Vascular: No JVD. Cardiovascular:      Rate and Rhythm: Normal rate and regular rhythm. Heart sounds: S1 normal and S2 normal.   Pulmonary:      Effort: Respiratory distress (mild) present. Breath sounds: Rhonchi present. Abdominal:      General: Bowel sounds are normal. There is distension. Tenderness: There is no abdominal tenderness. Musculoskeletal:         General: Swelling present. No deformity. Cervical back: Normal range of motion and neck supple. Skin:     General: Skin is dry. Coloration: Skin is not jaundiced. Neurological:      Mental Status: She is alert and oriented to person, place, and time. Mental status is at baseline. Psychiatric:         Mood and Affect: Mood normal.         Behavior: Behavior normal.        =======================================================================================     DATA:  Diagnostic tests reviewed by me for today's visit:   (AS NEEDED FOR MY EVALUATION AND MANAGEMENT).        Recent Labs     11/13/22  0436 11/14/22  0539 11/15/22  0519   WBC 10.5 7.5 6.4   HCT 40.1 38.7 39.7    305 302       Iron Saturation:  No components found for: PERCENTFE  FERRITIN:  No results found for: FERRITIN  IRON:    Lab Results   Component Value Date/Time    IRON 54 11/09/2020 11:05 AM     TIBC:    Lab Results   Component Value Date/Time    TIBC 216 11/09/2020 11:05 AM       Recent Labs     11/13/22  0436 11/14/22  0539 11/15/22  0519    142 143   K 3.8 3.3* 4.2   * 109 112*   CO2 19* 22 22   BUN 48* 52* 40*   CREATININE 1.6* 1.8* 1.4*       Recent Labs     11/13/22  0436 11/14/22  0539 11/15/22  0519   CALCIUM 8.8 8.9 8.5   MG 2.30 2.20 2.10   PHOS 2.8 3.2 2.8       No results for input(s): PH, PCO2, PO2 in the last 72 hours.     Invalid input(s): Surya Potts    ABG:  No results found for: PH, PCO2, PO2, HCO3, BE, THGB, TCO2, O2SAT  VBG:    Lab Results   Component Value Date/Time    PHVEN 7.303 09/15/2022 11:04 AM    NMR2CIX 38.8 09/15/2022 11:04 AM    BEVEN -6.7 09/15/2022 11:04 AM    U6PIABDE 97 09/15/2022 11:04 AM       LDH:  No results found for: LDH  Uric Acid:    Lab Results   Component Value Date/Time    LABURIC 4.8 05/28/2019 12:31 PM       PT/INR:    Lab Results   Component Value Date/Time    PROTIME 15.9 11/12/2022 02:35 AM    INR 1.27 11/12/2022 02:35 AM     Warfarin PT/INR:  No components found for: PTPATWAR, PTINRWAR  PTT:    Lab Results   Component Value Date/Time    APTT 42.5 01/22/2022 02:16 PM   [APTT}  Last 3 Troponin:    Lab Results   Component Value Date/Time    TROPONINI 0.09 11/13/2022 12:15 PM    TROPONINI 0.11 11/12/2022 12:11 PM    TROPONINI 0.17 11/12/2022 04:33 AM       U/A:    Lab Results   Component Value Date/Time    COLORU Yellow 11/12/2022 06:06 PM    PROTEINU Negative 11/12/2022 06:06 PM    PHUR 5.0 11/12/2022 06:06 PM    WBCUA 2 11/12/2022 06:06 PM    RBCUA 1 11/12/2022 06:06 PM    YEAST neg 03/09/2021 02:00 PM    BACTERIA None Seen 11/12/2022 06:06 PM    CLARITYU Clear 11/12/2022 06:06 PM    SPECGRAV 1.020 11/12/2022 06:06 PM    LEUKOCYTESUR TRACE 11/12/2022 06:06 PM    UROBILINOGEN 0.2 11/12/2022 06:06 PM    BILIRUBINUR Negative 11/12/2022 06:06 PM    BILIRUBINUR NEGATIVE 12/04/2011 06:40 PM    BLOODU TRACE 11/12/2022 06:06 PM    GLUCOSEU Negative 11/12/2022 06:06 PM    GLUCOSEU NEGATIVE 12/04/2011 06:40 PM     Microalbumen/Creatinine ratio:  No components found for: RUCREAT  24 Hour Urine for Protein:  No components found for: RAWUPRO, UHRS3, QFFP60KI, UTV3  24 Hour Urine for Creatinine Clearance:  No components found for: CREAT4, UHRS10, UTV10  Urine Toxicology:  No components found for: [place]ean Else, IBENZO, ICOCAINE, IMARTHC, IOPIATES, IPHENCYC    HgBA1c:    Lab Results   Component Value Date/Time    LABA1C 5.5 01/23/2022 04:22 AM     RPR:  No results found for: RPR  HIV:  No results found for: HIV  REMY:  No results found for: ANATITER, REMY  RF:  No results found for: RF  DSDNA:  No components found for: DNA  AMYLASE:  No results found for: AMYLASE  LIPASE:    Lab Results   Component Value Date/Time    LIPASE 25.0 09/14/2022 07:11 PM     Fibrinogen Level:  No components found for: FIB       BELOW MENTIONED RADIOLOGY STUDY RESULTS BY ME (AS NEEDED FOR MY EVALUATION AND MANAGEMENT). XR CHEST PORTABLE    Result Date: 11/11/2022  EXAMINATION: ONE X-RAY VIEW OF THE CHEST 11/11/2022 5:33 pm COMPARISON: September 10, 2022 HISTORY: ORDERING SYSTEM PROVIDED HISTORY:  Chest pain no fever or cough. TECHNOLOGIST PROVIDED HISTORY: Reason for Exam:  Chest pain no fever or cough. FINDINGS: The cardiomediastinal silhouette is markedly enlarged. Pacer leads are stable in position. Vascular congestive changes are present with mild interstitial pulmonary edema. No evidence of lobar infiltrate. No pleural effusion or pneumothorax. Scarring at the left costophrenic angle is grossly stable. Severe cardiomegaly, with vascular congestive changes and mild interstitial pulmonary edema. CT CHEST PULMONARY EMBOLISM W CONTRAST    Result Date: 11/11/2022  EXAMINATION: CTA OF THE CHEST 11/11/2022 7:53 pm TECHNIQUE: CTA of the chest was performed after the administration of intravenous contrast.  Multiplanar reformatted images are provided for review. MIP images are provided for review. Automated exposure control, iterative reconstruction, and/or weight based adjustment of the mA/kV was utilized to reduce the radiation dose to as low as reasonably achievable.  COMPARISON: 09/15/2022, and 05/18/2021 HISTORY: ORDERING SYSTEM PROVIDED HISTORY: chest pain, dyspnea TECHNOLOGIST PROVIDED HISTORY: Reason for exam:->chest pain, dyspnea Decision Support Exception - unselect if not a suspected or confirmed emergency medical condition->Emergency Medical Condition (MA) Reason for Exam: chest pain, dyspnea Relevant Medical/Surgical History: Shortness of Breath (From home by [facility]. Had 2 stents placed this week) FINDINGS: Pulmonary Arteries: The pulmonary arteries are adequately opacified for evaluation. Evaluation is limited by respiratory motion. There is no evidence of acute pulmonary embolus to the segmental level. Stable enlargement of the main pulmonary artery which can be seen in the setting of pulmonary artery hypertension. Mediastinum: Diffuse ectasia of the ascending aorta measuring up to 4.2 cm may be mildly increased since the comparison study when it measured up to 3.8 cm. Unchanged cardiomegaly with an implanted cardiac device in place. Coronary artery disease. The patient is status post median sternotomy. Moderate calcified atherosclerotic plaque. A left atrial appendage Watchman device is in place. Mildly prominent right hilar lymph nodes are not significantly changed. No lymphadenopathy by size criteria. Lungs/pleura: Small right and trace left pleural effusions. Left pleural calcifications which could indicate asbestos related pleural disease. Areas of bilateral septal thickening and perihilar ground-glass attenuation, more prominent on the right and most likely reflective of pulmonary edema. No pneumothorax. Scattered calcified granulomas. Within the limitations of respiratory motion, there are no suspicious pulmonary nodules. Upper Abdomen: Limited images of the upper abdomen demonstrate reflux of contrast into the hepatic veins which can be seen in the setting of right heart failure. No acute upper abdominal findings.  Soft Tissues/Bones: No acute bone or soft tissue abnormality. 1. Motion limited evaluation with no evidence of acute pulmonary embolism to the segmental level. 2. Pulmonary edema with small right and trace left pleural effusions. 3. Reflux of contrast into the hepatic veins can be seen in the setting of right heart failure. 4. Unchanged enlargement of main pulmonary artery which can be seen in the setting of pulmonary artery hypertension. 5. Chronic findings outlined in body of the report. This report was transcribed using voice recognition software, mainly. So please excuse brevity and/or typos. Every effort was made to ensure accuracy, however, inadvertent computerized transcription errors may be present. Please contact us, if any questions or clarifications are needed.

## 2022-11-15 NOTE — PROGRESS NOTES
Hospitalist Progress Note      PCP: Sid Coles MD    Date of Admission: 11/11/2022    Chief Complaint: Shortness of breath    Hospital Course: 80 y.o. female with history of A. fib, s/p watchman implanted 5/17/2021, TIA, chronic systolic CHF, CAD with recent drug-eluting stent x2 on 10/19/2022, CABG in 87, Hypertension, HLD, and CKD 3. 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 She presented to ER for shortness of breath. In the ED troponin was elevated at 0.18 and EKG without acute ST-T changes. She was started on heparin drip and Lasix. Subjective: Doing reasonably okay. No diarrhea this morning . Eating reasonably okay.       Medications:  Reviewed    Infusion Medications    sodium chloride 100 mL/hr at 11/15/22 0557    sodium chloride      heparin (PORCINE) Infusion 22 Units/kg/hr (11/15/22 0261)     Scheduled Medications    vancomycin  125 mg Oral 4 times per day    allopurinol  100 mg Oral Daily    aspirin EC  81 mg Oral Daily    carvedilol  25 mg Oral BID WC    levothyroxine  125 mcg Oral Daily    rosuvastatin  5 mg Oral Nightly    topiramate  50 mg Oral BID    sodium chloride flush  5-40 mL IntraVENous 2 times per day    clopidogrel  75 mg Oral Daily     PRN Meds: loperamide, melatonin, albuterol sulfate HFA, HYDROcodone-acetaminophen, sodium chloride flush, sodium chloride, ondansetron **OR** ondansetron, polyethylene glycol, acetaminophen **OR** acetaminophen, perflutren lipid microspheres, heparin (porcine), heparin (porcine), nitroGLYCERIN      Intake/Output Summary (Last 24 hours) at 11/15/2022 1035  Last data filed at 11/15/2022 0215  Gross per 24 hour   Intake 1984.05 ml   Output --   Net 1984.05 ml       Physical Exam Performed:    /67   Pulse 77   Temp 97.5 °F (36.4 °C) (Oral)   Resp 18   Ht 5' 3\" (1.6 m)   Wt 110 lb 3.2 oz (50 kg)   SpO2 97%   BMI 19.52 kg/m²     General appearance: No apparent distress, appears stated age and cooperative. HEENT: Pupils equal, round, and reactive to light. Conjunctivae/corneas clear. Neck: Supple, with full range of motion. No jugular venous distention. Trachea midline. Respiratory:  Normal respiratory effort. Clear to auscultation, bilaterally without Rales/Wheezes/Rhonchi. Cardiovascular: Regular rate and rhythm with normal S1/S2 without murmurs, rubs or gallops. Abdomen: Soft, non-tender, non-distended with normal bowel sounds. Musculoskeletal: No clubbing, cyanosis or edema bilaterally. Full range of motion without deformity. Skin: Skin color, texture, turgor normal.  No rashes or lesions. Neurologic:  Neurovascularly intact without any focal sensory/motor deficits. Cranial nerves: II-XII intact, grossly non-focal.  Psychiatric: Alert and oriented, thought content appropriate, normal insight  Capillary Refill: Brisk, 3 seconds, normal   Peripheral Pulses: +2 palpable, equal bilaterally       Labs:   Recent Labs     11/13/22 0436 11/14/22 0539 11/15/22  0519   WBC 10.5 7.5 6.4   HGB 12.7 12.0 11.9*   HCT 40.1 38.7 39.7    305 302     Recent Labs     11/13/22 0436 11/14/22  0539 11/15/22  0519    142 143   K 3.8 3.3* 4.2   * 109 112*   CO2 19* 22 22   BUN 48* 52* 40*   CREATININE 1.6* 1.8* 1.4*   CALCIUM 8.8 8.9 8.5   PHOS 2.8 3.2 2.8     Recent Labs     11/13/22  0436 11/14/22  0539 11/15/22  0519   AST 17 14* 15   ALT 8* 8* 8*   BILITOT 0.3 0.3 <0.2   ALKPHOS 75 67 71     No results for input(s): INR in the last 72 hours.   Recent Labs     11/12/22  1211 11/13/22  1215   TROPONINI 0.11* 0.09*       Urinalysis:      Lab Results   Component Value Date/Time    NITRU Negative 11/12/2022 06:06 PM    WBCUA 2 11/12/2022 06:06 PM    BACTERIA None Seen 11/12/2022 06:06 PM    RBCUA 1 11/12/2022 06:06 PM    BLOODU TRACE 11/12/2022 06:06 PM    SPECGRAV 1.020 11/12/2022 06:06 PM    GLUCOSEU Negative 11/12/2022 06:06 PM    GLUCOSEU NEGATIVE 12/04/2011 06:40 PM Radiology:  CT CHEST PULMONARY EMBOLISM W CONTRAST   Final Result   1. Motion limited evaluation with no evidence of acute pulmonary embolism to   the segmental level. 2. Pulmonary edema with small right and trace left pleural effusions. 3. Reflux of contrast into the hepatic veins can be seen in the setting of   right heart failure. 4. Unchanged enlargement of main pulmonary artery which can be seen in the   setting of pulmonary artery hypertension. 5. Suspected small increase in the size of ascending aortic ectasia which now   measures 4.2 cm and previously measured 3.8 cm. Consider nonemergent   vascular consultation. 6. Chronic findings outlined in body of the report. XR CHEST PORTABLE   Final Result   Severe cardiomegaly, with vascular congestive changes and mild interstitial   pulmonary edema. Assessment/Plan:    Active Hospital Problems    Diagnosis     Acute respiratory failure with hypoxia ([address] [J96.01]      Priority: Medium    NSTEMI (non-ST elevated myocardial infarction) ([address] [I21.4]     Chronic combined systolic (congestive) and diastolic (congestive) heart failure ([address] [I50.42]     Essential hypertension, benign [I10]      Acute on chronic combined heart failure: Diuresed with IV Lasix. Currently on Lasix on hold. Creatinine improving overall. Echocardiogram this admission showed 45-50% EF. Non-ST elevation MI: Cardiology on board. Angiogram was deferred due to GIANA. Continue with Crestor, Coreg, aspirin, Plavix    GIANA/CKD stage III: Creatinine improved to 1.4. Nephrology on board    C. difficile infection.:  On p.o. vancomycin. Overall improving. Aortic arch ectasia: Needs vascular follow-up    Deconditioning: PT OT consulted    Acute urinary retention: Straight cath as needed. urology on board. Paroxysmal atrial fibrillation: Status post watchman. Continue Coreg    Hyperlipidemia: Statin    Deconditioning: PT OT consulted.       DVT Prophylaxis: Heparin drip  Diet: ADULT DIET; Regular; Low Sodium (2 gm)  Code Status: Full Code  PT/OT Eval Status: Consulted    Dispo -inpatient 1 more day.     [facility] [place], [place]

## 2022-11-16 VITALS
SYSTOLIC BLOOD PRESSURE: 148 MMHG | RESPIRATION RATE: 17 BRPM | TEMPERATURE: 97.4 F | HEART RATE: 71 BPM | DIASTOLIC BLOOD PRESSURE: 72 MMHG | HEIGHT: 63 IN | WEIGHT: 111.8 LBS | OXYGEN SATURATION: 98 % | BODY MASS INDEX: 19.81 KG/M2

## 2022-11-16 LAB
A/G RATIO: 1.3 (ref 1.1–2.2)
ALBUMIN SERPL-MCNC: 3.1 G/DL (ref 3.4–5)
ALP BLD-CCNC: 67 U/L (ref 40–129)
ALT SERPL-CCNC: 7 U/L (ref 10–40)
ANION GAP SERPL CALCULATED.3IONS-SCNC: 11 MMOL/L (ref 3–16)
ANTI-XA UNFRAC HEPARIN: 0.47 IU/ML (ref 0.3–0.7)
ANTI-XA UNFRAC HEPARIN: 0.56 IU/ML (ref 0.3–0.7)
AST SERPL-CCNC: 13 U/L (ref 15–37)
BILIRUB SERPL-MCNC: <0.2 MG/DL (ref 0–1)
BUN BLDV-MCNC: 29 MG/DL (ref 7–20)
CALCIUM SERPL-MCNC: 8.9 MG/DL (ref 8.3–10.6)
CHLORIDE BLD-SCNC: 116 MMOL/L (ref 99–110)
CO2: 17 MMOL/L (ref 21–32)
CREAT SERPL-MCNC: 1.1 MG/DL (ref 0.6–1.2)
GFR SERPL CREATININE-BSD FRML MDRD: 50 ML/MIN/{1.73_M2}
GLUCOSE BLD-MCNC: 80 MG/DL (ref 70–99)
MAGNESIUM: 2.1 MG/DL (ref 1.8–2.4)
POTASSIUM SERPL-SCNC: 3.9 MMOL/L (ref 3.5–5.1)
SODIUM BLD-SCNC: 144 MMOL/L (ref 136–145)
TOTAL PROTEIN: 5.4 G/DL (ref 6.4–8.2)

## 2022-11-16 PROCEDURE — 97535 SELF CARE MNGMENT TRAINING: CPT

## 2022-11-16 PROCEDURE — 97165 OT EVAL LOW COMPLEX 30 MIN: CPT

## 2022-11-16 PROCEDURE — 83735 ASSAY OF MAGNESIUM: CPT

## 2022-11-16 PROCEDURE — 6370000000 HC RX 637 (ALT 250 FOR IP): Performed by: INTERNAL MEDICINE

## 2022-11-16 PROCEDURE — 85520 HEPARIN ASSAY: CPT

## 2022-11-16 PROCEDURE — 97161 PT EVAL LOW COMPLEX 20 MIN: CPT

## 2022-11-16 PROCEDURE — 97530 THERAPEUTIC ACTIVITIES: CPT

## 2022-11-16 PROCEDURE — 6370000000 HC RX 637 (ALT 250 FOR IP): Performed by: PHYSICIAN ASSISTANT

## 2022-11-16 PROCEDURE — 36415 COLL VENOUS BLD VENIPUNCTURE: CPT

## 2022-11-16 PROCEDURE — 80053 COMPREHEN METABOLIC PANEL: CPT

## 2022-11-16 PROCEDURE — 6370000000 HC RX 637 (ALT 250 FOR IP): Performed by: NURSE PRACTITIONER

## 2022-11-16 RX ORDER — MECLIZINE HCL 12.5 MG/1
25 TABLET ORAL ONCE
Status: COMPLETED | OUTPATIENT
Start: 2022-11-16 | End: 2022-11-16

## 2022-11-16 RX ORDER — TORSEMIDE 20 MG/1
10 TABLET ORAL
Status: DISCONTINUED | OUTPATIENT
Start: 2022-11-16 | End: 2022-11-16 | Stop reason: HOSPADM

## 2022-11-16 RX ADMIN — Medication 125 MG: at 05:29

## 2022-11-16 RX ADMIN — TORSEMIDE 10 MG: 20 TABLET ORAL at 12:35

## 2022-11-16 RX ADMIN — CLOPIDOGREL BISULFATE 75 MG: 75 TABLET ORAL at 09:06

## 2022-11-16 RX ADMIN — TOPIRAMATE 50 MG: 25 TABLET, FILM COATED ORAL at 09:06

## 2022-11-16 RX ADMIN — ALLOPURINOL 100 MG: 100 TABLET ORAL at 09:06

## 2022-11-16 RX ADMIN — CARVEDILOL 25 MG: 25 TABLET, FILM COATED ORAL at 09:06

## 2022-11-16 RX ADMIN — ASPIRIN 81 MG: 81 TABLET, COATED ORAL at 09:06

## 2022-11-16 RX ADMIN — MECLIZINE 25 MG: 12.5 TABLET ORAL at 13:46

## 2022-11-16 RX ADMIN — LEVOTHYROXINE SODIUM 125 MCG: 0.03 TABLET ORAL at 05:30

## 2022-11-16 RX ADMIN — Medication 125 MG: at 12:35

## 2022-11-16 ASSESSMENT — PAIN SCALES - GENERAL: PAINLEVEL_OUTOF10: 0

## 2022-11-16 NOTE — PROGRESS NOTES
CLINICAL PHARMACY NOTE: MEDS TO BEDS    Total # of Prescriptions Filled: 1   The following medications were delivered to the patient:  Lillian cooper    Additional Documentation:     Medication was picked up in the Outpatient Pharmacy by patient's daughter Roddy Nino

## 2022-11-16 NOTE — PROGRESS NOTES
Last xa for Heparin Gtt not therapeutic bolus given, dose adjusted, and labs reordered for 3 days to help ensure therapeutic range without delays.

## 2022-11-16 NOTE — PROGRESS NOTES
[facility] Department   Phone: [phone]    Occupational Therapy    [x] Initial Evaluation            [] Daily Treatment Note         [] Discharge Summary      Patient: Madison Marcelo   : [id number]   MRN: [id number]   Date of Service:  2022    Admitting Diagnosis:  Acute respiratory failure with hypoxia [facility])  Current Admission Summary:  Madison Marcelo is a 80 y.o. female with history of coronary artery disease with recent drug-eluting stent x2 on 10/19/2022, CABG in 80, Hypertension, HLD, and CKD. Presents to ER for shortness of breath. Daughter is at bedside and states yesterday she seemed confused. This afternoon she developed shortness of breath. She denies any chest pain or cough. She is afebrile. She denies any lower extremity edema. Past Medical History:  has a past medical history of Allergic rhinitis, cause unspecified, Arthritis, Atrial fibrillation ([address] Bronchopneumonia, CAD (coronary artery disease), Cerebral artery occlusion with cerebral infarction ([address] Chronic gouty arthropathy, Chronic kidney disease, Congestive heart failure, unspecified, Degeneration of cervical intervertebral disc, Essential and other specified forms of tremor, Gout, HIGH CHOLESTEROL, History of CVA (cerebrovascular accident), Hx of blood clots, Hypertension, Influenza, Leakage of Watchman left atrial appendage closure device, Mitral valve stenosis and aortic valve insufficiency, Movement disorder, Pacemaker, Peptic ulcer, unspecified site, unspecified as acute or chronic, without mention of hemorrhage, perforation, or obstruction, Thyroid disease, Unspecified disorder of kidney and ureter, and Unspecified hypothyroidism. Past Surgical History:  has a past surgical history that includes back surgery;  Cholecystectomy; Cardiac surgery; Coronary artery bypass graft (); shoulder surgery; Cardiac catheterization (2012); hip surgery (Left, 2017); joint replacement; Cardiac catheterization (08/07/2018); Percutaneous Transluminal Coronary Angio (11/04/2014); pr njx aa&/strd tfrml epi lumbar/sacral 1 level (Right, 12/3/2018); Femoral-femoral Bypass Graft (N/A, 8/22/2019); femoral bypass (Left, 8/22/2019); and IR KYPHOPLASTY LUMBAR 1 VERTEBRAL BODY (5/14/2021). Discharge Recommendations: Mir Fermin scored a 18/24 on the AM-PAC ADL Inpatient form. Current research shows that an AM-PAC score of 18 or greater is typically associated with a discharge to the patient's home setting. Based on the patient's AM-PAC score, and their current ADL deficits, it is recommended that the patient have 2-3 sessions per week of Occupational Therapy at d/c to increase the patient's independence. At this time, this patient demonstrates the endurance and safety to discharge home with home based OT (home vs OP services) and a follow up treatment frequency of 2-3x/wk. Please see assessment section for further patient specific details. If patient discharges prior to next session this note will serve as a discharge summary. Please see below for the latest assessment towards goals. DME Required For Discharge: patient has all required DME for discharge    Precautions/Restrictions: high fall risk  Weight Bearing Restrictions: no restrictions  [] Right Upper Extremity  [] Left Upper Extremity [] Right Lower Extremity  [] Left Lower Extremity     Required Braces/Orthotics: no braces required   [] Right  [] Left  Positional Restrictions:no positional restrictions    Pre-Admission Information   Lives With: .   Comment: 41 yo grandson \"Eddie\"  and spouse               Type of Home: house  Home Layout: one level  Home Access:  2 step to enter without rails  on porch, usually comes through garage w/o steps  Bathroom Layout: tub only  Bathroom Equipment: grab bars in shower, grab bars around toilet, shower chair, hand held shower head  Toilet Height: elevated height  Home Equipment: rollator - 4 wheeled walker, manual wheelchair, reacher  Transfer Assistance: Independent without use of device  Ambulation Assistance:modified independent with use of 4WW  at home, use electric cart within community  ADL Assistance: independent with all ADL's, requires assistance with dressing for socks  IADL Assistance: requires assistance with laundry, requires assistance with cleaning, requires assistance with yard work, requires assistance with driving/transportation  Active :        [] Yes                 [x] No  Hand Dominance: [] Left                 [] Right  Current Employment: retired. Occupation: administrative work at [employer]: small dog, reading, watch TV  Recent Falls: prior history of 4-5 falls within past year due to lifting dog and tripping on rugs but none within time frame of last hospital admission       Examination   Vision:   Vision Gross Assessment: Impaired and Vision Corrective Device: wears glasses for distance  Hearing:   WFL  Observation:   General Observation:  pt with natural posterior lean  Posture: Forward head, rounded shoulders, kyphotic  Sensation:   reports numbness and tingling in (B) LE  Proprioception:    Note: pt would benefit from BPPV screening. See PT notes for further details. Pt unaware of her posterior lean and reporting she feels \"too far forward\" when posture corrected for upright sitting. Tone:   Normotonic  ROM:   (B) Shoulder AROM WFL  Strength:   (B) UE strength grossly WFL    Therapist Clinical Decision Making (Complexity): low complexity  Clinical Presentation: stable      Subjective  General: Pt presenting supine in bed upon entry. Daughter also present. Pt is pleasant and agreeable to PT/OT evaluations. Pain: 0/10  Pain Interventions: not applicable: pt denies pain but reporting increased dizziness in sitting which she reports is chronic. She takes meclizine at baseline.  Nurse notified and provided medication for dizziness at beginning of session. Activities of Daily Living  Basic Activities of Daily Living  Upper Extremity Dressing: minimal assistance Comment: Pt able to thread shirt bilateral but requires Min A for adjusting. Completed sitting at EOB; however, pt with increased difficulty donning shirt in sitting secondary to natural posterior lean. Cues required for righting trunk posture. Increased time required for pacing. Lower Extremity Dressing: maximum assistance Comment: Max A for threading socks and pants in sitting EOB. Increased difficulty due to posterior lean. Requires Min-Mod A for pulling knees to hips in stance. Increased time required for pacing. Comment: daughter reports pt requires some assistance with socks and pants at baseline. Instrumental Activities of Daily Living  No IADL completed on this date. Functional Mobility  Bed Mobility  Supine to Sit: stand by assistance  Scooting: stand by assistance  Comments: HOB raised and bed rail used. Transfers  Sit to stand transfer:contact guard assistance  Stand to sit transfer: contact guard assistance  Stand step transfer: contact guard assistance  Comments: RW for UE support   Functional Mobility:  Sitting Balance: contact guard assistance, moderate assistance, pt requires CGA -Mod A for sitting balance, especially unsupported sitting secondary to posterior lean. Pt sat EOB for ~10 min during ADL completion. Standing Balance: contact guard assistance, minimal assistance, CGA + Min A to stand for ~4-5 min during ambulation, transfers, and ADL completion. Functional Mobility: .  minimal assistance, Pt ambulated 20ft with Min A using RW. Slow mxaimilian noted. Pt feeling dizzy throughout despite BP within acceptable parameters. Increased time required for pacing.      Other Therapeutic Interventions    Functional Outcomes  AM-PAC Inpatient Daily Activity Raw Score: 18    Cognition  WFL  Orientation:    alert and oriented x 4  Command Following: WFL     Education  Barriers To Learning: none  Patient Education: patient educated on goals, OT role and benefits, plan of care, family education, transfer training, discharge recommendations  Learning Assessment:  patient verbalizes understanding, would benefit from continued reinforcement    Assessment  Activity Tolerance: Pt limited by dizziness  Impairments Requiring Therapeutic Intervention: decreased functional mobility, decreased ADL status, decreased strength, decreased safety awareness, decreased endurance, decreased sensation, decreased balance, vestibular impairment, decreased posture  Prognosis: excellent  Clinical Assessment: Pt presenting below her functional baseline with the above deficits associated with acute respiratory failure with hypoxia. Pt primarily limited by dizziness, deconditioning, and generalized weakness. She has good family support; however, recommending 24 hour care and supervision at time of discharge to home. Pt would also benefit from vestibular screening on outpatient basis. Continued OT indicated in order to maximize safety and independence with all occupational pursuits.    Safety Interventions: patient left in chair, chair alarm in place, nurse notified, and family/caregiver present    Plan  Frequency: 3-5 x/per week  Current Treatment Recommendations: strengthening, ROM, balance training, functional mobility training, transfer training, gait training, endurance training, ADL/self-care training, IADL training, home management training, and safety education    Goals  Patient Goals: Return to home   Short Term Goals:  Time Frame: Discharge  Patient will complete upper body ADL at modified independent   Patient will complete lower body ADL at minimal assistance   Patient will complete toileting at modified independent   Patient will complete grooming at modified independent   Patient will complete functional transfers at modified independent     Therapy Session Time Individual Group Co-treatment   Time In    1330   Time Out    1423   Minutes    53    Timed Code Treatment Minutes: 38 Minutes  Total Treatment Minutes:  53 minutes       Electronically Signed By: PIPO Mcclendon OTR/CT  [id number]

## 2022-11-16 NOTE — PROGRESS NOTES
Data- discharge order received, pt verbalized agreement to discharge, needs for [facility] with [facility] for PT/OT, JUANI reviewed and signed by MD, to be completed by RN. Action- AVS prepared, discharge instructions prepared and given to pt and family (Carny Cosby)  medication information packet given r/t NEW or CHANGED prescriptions, pt verbalized understanding further self-review. D/C instruction summary: Diet- regular low salt, Activity- as tolerated, follow up with Primary Care Physician Natty Porter -[phone] appointment in 1 week, cardiology NP as instructed, immunizations reviewed, medications prescriptions filled and picked up by pt prior to discharge. Contact information provided to above agencies used. Response- Case Management/ reported faxing completed JUANI and AVS to needed HHC/DME services stated above. Pt belongings gathered, IV removed, pt dressed by PCA. Disposition is home with HHC/DME as stated above, transported with family, taken to lobby via w/c with RN, no complications. 1. WEIGHT: Admit Weight: 110 lb 6.4 oz (50.1 kg) (11/12/22 0007)        Today  Weight: 111 lb 12.8 oz (50.7 kg) (11/16/22 0307)       2.  O2 SAT.: SpO2: 98 % (11/16/22 1134)

## 2022-11-16 NOTE — DISCHARGE INSTR - COC
Continuity of Care Form    Patient Name: Phuong Paz   :  [id number]  MRN:  [id number]    Admit date:  2022  Discharge date:  2022    Code Status Order: Full Code   Advance Directives:     Admitting Physician:  Hemant Kelley MD  PCP: Tasneem Ordoñez MD    Discharging Nurse: [place] [address] Unit/Room#: 4GP-5403/3870-80  Discharging Unit Phone Number: [phone]    Emergency Contact:   Extended Emergency Contact Information  Primary Emergency Contact: Rosalba Jethro  Address: [address] Phone: [phone]  Mobile Phone: [phone]  Relation: Child  Secondary Emergency Contact: [address], [address] [facility] Phone: [phone]  Relation: Child    Past Surgical History:  Past Surgical History:   Procedure Laterality Date    BACK SURGERY      CARDIAC CATHETERIZATION  2012    CARDIAC CATHETERIZATION  2018    Unsuccesful  of RCA    CARDIAC SURGERY      CABG & Cardiac ablation    CHOLECYSTECTOMY      CORONARY ARTERY BYPASS GRAFT  1987    LIMA- Diag/LAD, SVG- RCA    FEMORAL BYPASS Left 2019    LEFT FEMORAL TO POPLITEAL BYPASS GRAFT performed by Jason Dozier MD at [facility]. [place] 41 N/A 2019    FEMORAL TO FEMORAL BYPASS performed by Jason Dozier MD at [address] Left 2017    Left hip pinning    IR KYPHOPLASTY LUMBAR FIRST LEVEL  2021    IR KYPHOPLASTY LUMBAR FIRST LEVEL 2021 [facility] SPECIAL PROCEDURES    JOINT REPLACEMENT      SC NJX AA&/STRD TFRML EPI LUMBAR/SACRAL 1 LEVEL Right 12/3/2018    RIGHT L3 AND L4 LUMBAR TRANSFORAMINAL EPIRUAL STEROID INJECTION WITH FLUOROSCOPY performed by Boris Markham MD at [facility]    PTCA  2014    MARLENY - 3.0 x 28 to the Ist Diag    SHOULDER SURGERY      left       Immunization History:   Immunization History   Administered Date(s) Administered    COVID-19, MODERNA BLUE border, Primary or Immunocompromised, (age 12y+), IM, 100 mcg/0.5mL 01/28/2021, 02/25/2021, 11/16/2021    Influenza 09/19/2012, 10/02/2013    Influenza A (A0S8-61) Vaccine IM 12/21/2009    Influenza Virus Vaccine 10/03/2005, 10/12/2007, 09/28/2012, 10/04/2013, 10/20/2014, 10/05/2015    Influenza Whole 09/14/2011    Influenza, FLUAD, (age 72 y+), Adjuvanted, 0.5mL 11/02/2021, 10/28/2022    Influenza, FLUBLOK, (age 25 y+), PF, 0.5mL 10/12/2020    Influenza, High Dose (Fluzone 65 yrs and older) 10/10/2014, 09/30/2015, 11/02/2016, 10/24/2017, 09/17/2018    Influenza, Triv, inactivated, subunit, adjuvanted, IM (Fluad 65 yrs and older) 09/30/2019    Pneumococcal Conjugate 13-valent (Vlxqzus35) 12/04/2015    Pneumococcal Conjugate 7-valent (Prevnar7) 12/03/2009    Pneumococcal Polysaccharide (Oiagxcuys96) 09/28/2000, 01/28/2008    Tdap (Boostrix, Adacel) 08/17/2011, 05/12/2020, 12/08/2021       Active Problems:  Patient Active Problem List   Diagnosis Code    Essential hypertension, benign I10    Mixed hyperlipidemia E78.2    Chronic gouty arthropathy M1A. 00X0    Acquired hypothyroidism E03.9    Non-rheumatic mitral regurgitation I34.0    COPD (chronic obstructive pulmonary disease) ([facility]) J44.9    Restrictive lung disease J98.4    Sick sinus syndrome ([facility]) I49.5    Allergic rhinitis J30.9    Fibrocystic breast N60.19    Cerebrovascular accident (CVA) ([facility] I63.9    HTN (hypertension), benign I10    Pacemaker Z95.0    PAT (paroxysmal atrial tachycardia) ([facility]) I47.1    Primary osteoarthritis involving multiple joints M15.9    Vitamin D deficiency E55.9    Tremor R25.1    Chronic total occlusion of native coronary artery I25.10, I25.82    Age related osteoporosis M81.0    Chronic combined systolic (congestive) and diastolic (congestive) heart failure ([facility]) I50.42    NSTEMI (non-ST elevated myocardial infarction) ([facility]) I21.4    Stage 3 chronic kidney disease ([facility]) N18.30    PAD (peripheral artery disease) ([facility]) I73.9    Atherosclerosis of native arteries of extremities with intermittent claudication, bilateral legs ([facility]) I70.213    Idiopathic peripheral neuropathy G60.9    Ischemic cardiomyopathy I25.5    Peripheral vertigo, bilateral H81.393    PAF (paroxysmal atrial fibrillation) ([facility]) I48.0    Dyslipidemia E78.5    Iron deficiency anemia D50.9    Anemia D64.9    Bilateral sensorineural hearing loss H90.3    Memory loss due to medical condition R41.3    Symptomatic bradycardia R00.1    Pacemaker lead failure T82.110A    Presence of Watchman left atrial appendage closure device Z95.818    Chronic right sacroiliac pain M53.3, G89.29    Postcholecystectomy diarrhea K91.89, R19.7    Chronic renal failure, stage 3b ([facility]) N18.32    Coronary artery disease involving native heart without angina pectoris, unspecified vessel or lesion type I25.10    Hoarseness, persistent R49.0    Acute respiratory failure with hypoxia ([facility]) J96.01       Isolation/Infection:   Isolation            C Diff Contact          Patient Infection Status       Infection Onset Added Last Indicated Last Indicated By Review Planned Expiration Resolved Resolved By    C-diff (Clostridium difficile) 22 C. difficile toxin Molecular 22      Resolved    C-diff Rule Out 22 C. difficile toxin Molecular (Ordered)   22 Rule-Out Test Resulted    C-diff Rule Out 22 GI Bacterial Pathogens By PCR (Ordered)   22 Rule-Out Test Resulted    COVID-19 (Rule Out) 22 Respiratory Panel, Molecular, with COVID-19 (Restricted: peds pts or suitable admitted adults) (Ordered)   22 Rule-Out Test Resulted    C-diff Rule Out 09/10/22 09/10/22 09/10/22 Clostridium difficile toxin/antigen (Ordered)   22 Kb Singletary RN    Order     COVID-19 (Rule Out) 20 COVID-19 (Ordered)   20 Rule-Out Test Resulted            Nurse Assessment:  Last Vital Signs: BP (!) 148/72   Pulse 71   Temp 97.4 °F (36.3 °C) (Oral)   Resp 17   Ht 5' 3\" (1.6 m)   Wt 111 lb 12.8 oz (50.7 kg)   SpO2 98%   BMI 19.80 kg/m²     Last documented pain score (0-10 scale): Pain Level: 0  Last Weight:   Wt Readings from Last 1 Encounters:   11/16/22 111 lb 12.8 oz (50.7 kg)     Mental Status:  oriented and alert    IV Access:  - None    Nursing Mobility/ADLs:  Walking   Assisted  Transfer  Assisted  Bathing  Assisted  Dressing Assisted   Toileting  Assisted  Feeding  Independent  Med Admin  Assisted  Med Delivery   whole    Wound Care Documentation and Therapy:  Puncture 05/17/21 Anterior;Proximal;Right (Active)   Number of days: 548        Elimination:  Continence: Bowel: Yes  Bladder: Yes  Urinary Catheter: None   Colostomy/Ileostomy/Ileal Conduit: No       Date of Last BM: 11/16/2022      Intake/Output Summary (Last 24 hours) at 11/16/2022 1545  Last data filed at 11/16/2022 0904  Gross per 24 hour   Intake 1645.35 ml   Output --   Net 1645.35 ml     I/O last 3 completed shifts: In: 3139.4 [I.V.:3139.4]  Out: -     Safety Concerns: At Risk for Falls    Impairments/Disabilities:  vision    Nutrition Therapy:  Current Nutrition Therapy:   - Oral Diet:  General and Low Sodium (2gm)    Routes of Feeding: Oral  Liquids: No Restrictions  Daily Fluid Restriction: no  Last Modified Barium Swallow with Video (Video Swallowing Test): not done    Treatments at the Time of Hospital Discharge:   Respiratory Treatments: n/a  Oxygen Therapy:  is not on home oxygen therapy.   Ventilator:    - No ventilator support    Rehab Therapies: Physical Therapy and Occupational Therapy  Weight Bearing Status/Restrictions: No weight bearing restrictions  Other Medical Equipment (for information only, NOT a DME order):  walker  Other Treatments: n/a    Patient's personal belongings (please select all that are sent with patient):  clothes    RN SIGNATURE:  Electronically signed by Gabriela Little RN on 11/16/22 at 4:01 PM EST    CASE MANAGEMENT/SOCIAL WORK SECTION    Inpatient Status Date: ***    Readmission Risk Assessment Score:  Readmission Risk              Risk of Unplanned Readmission:  26           Discharging to Facility/ [address]  [address]  Phone: [phone]  Fax: [phone]     Dialysis Facility (if applicable)   Name:  Address:  Dialysis Schedule:  Phone:  Fax:    / signature: Stephanie Sagastume RN, BSN  [phone]     PHYSICIAN SECTION    Prognosis: Good    Condition at Discharge: Stable    Rehab Potential (if transferring to Rehab): Good    Recommended Labs or Other Treatments After Discharge:  home care      Physician Certification: I certify the above information and transfer of Mauro Campos  is necessary for the continuing treatment of the diagnosis listed and that she requires Home Care for less 30 days.      Update Admission H&P: No change in H&P    PHYSICIAN SIGNATURE:  Electronically signed by Sheree Shaffer MD on 22 at 3:57 PM EST

## 2022-11-16 NOTE — PLAN OF CARE
Problem: Discharge Planning  Goal: Discharge to home or other facility with appropriate resources  Outcome: Progressing     Problem: Pain  Goal: Verbalizes/displays adequate comfort level or baseline comfort level  Outcome: Progressing     Problem: Skin/Tissue Integrity  Goal: Absence of new skin breakdown  Description: 1. Monitor for areas of redness and/or skin breakdown  2. Assess vascular access sites hourly  3. Every 4-6 hours minimum:  Change oxygen saturation probe site  4. Every 4-6 hours:  If on nasal continuous positive airway pressure, respiratory therapy assess nares and determine need for appliance change or resting period.   Outcome: Progressing     Problem: Safety - Adult  Goal: Free from fall injury  Outcome: Progressing     Problem: ABCDS Injury Assessment  Goal: Absence of physical injury  Outcome: Progressing     Problem: Neurosensory - Adult  Goal: Achieves stable or improved neurological status  Outcome: Progressing     Problem: Respiratory - Adult  Goal: Achieves optimal ventilation and oxygenation  Outcome: Progressing     Problem: Cardiovascular - Adult  Goal: Maintains optimal cardiac output and hemodynamic stability  Outcome: Progressing     Problem: Skin/Tissue Integrity - Adult  Goal: Skin integrity remains intact  Outcome: Progressing     Problem: Genitourinary - Adult  Goal: Absence of urinary retention  Outcome: Progressing  Goal: Urinary catheter remains patent  Outcome: Progressing     Problem: Musculoskeletal - Adult  Goal: Return mobility to safest level of function  Outcome: Progressing     Problem: Infection - Adult  Goal: Absence of infection at discharge  Outcome: Progressing     Problem: Metabolic/Fluid and Electrolytes - Adult  Goal: Electrolytes maintained within normal limits  Outcome: Progressing  Goal: Hemodynamic stability and optimal renal function maintained  Outcome: Progressing     Problem: Nutrition Deficit:  Goal: Optimize nutritional status  Outcome: Progressing

## 2022-11-16 NOTE — CARE COORDINATION
[address] [place] [address] home care accepted. Patient discharged 11/16/2022 to home with [address] [place] [address] services.    All discharge needs met per case management     Sid Olivarez RN, BSN  [phone]

## 2022-11-16 NOTE — PROGRESS NOTES
[facility] Department   Phone: [phone]    Physical Therapy    [x] Initial Evaluation            [] Daily Treatment Note         [] Discharge Summary      Patient: Miesha Alfaro   : [id number]   MRN: [id number]   Date of Service:  2022  Admitting Diagnosis: Acute respiratory failure with hypoxia [facility])  Current Admission Summary: Miesha Alfaro is a 80 y.o. female with history of coronary artery disease with recent drug-eluting stent x2 on 10/19/2022, CABG in 87, Hypertension, HLD, and CKD. Presents to ER for shortness of breath. Daughter is at bedside and states yesterday she seemed confused. This afternoon she developed shortness of breath. She denies any chest pain or cough. She is afebrile. She denies any lower extremity edema. Past Medical History:  has a past medical history of Allergic rhinitis, cause unspecified, Arthritis, Atrial fibrillation ([address] Bronchopneumonia, CAD (coronary artery disease), Cerebral artery occlusion with cerebral infarction ([address] Chronic gouty arthropathy, Chronic kidney disease, Congestive heart failure, unspecified, Degeneration of cervical intervertebral disc, Essential and other specified forms of tremor, Gout, HIGH CHOLESTEROL, History of CVA (cerebrovascular accident), Hx of blood clots, Hypertension, Influenza, Leakage of Watchman left atrial appendage closure device, Mitral valve stenosis and aortic valve insufficiency, Movement disorder, Pacemaker, Peptic ulcer, unspecified site, unspecified as acute or chronic, without mention of hemorrhage, perforation, or obstruction, Thyroid disease, Unspecified disorder of kidney and ureter, and Unspecified hypothyroidism. Past Surgical History:  has a past surgical history that includes back surgery;  Cholecystectomy; Cardiac surgery; Coronary artery bypass graft (); shoulder surgery; Cardiac catheterization (2012); hip surgery (Left, 2017); joint replacement; Cardiac catheterization (08/07/2018); Percutaneous Transluminal Coronary Angio (11/04/2014); pr njx aa&/strd tfrml epi lumbar/sacral 1 level (Right, 12/3/2018); Femoral-femoral Bypass Graft (N/A, 8/22/2019); femoral bypass (Left, 8/22/2019); and IR KYPHOPLASTY LUMBAR 1 VERTEBRAL BODY (5/14/2021). Discharge Recommendations: Cooper Carrillo scored a 18/24 on the AM-PAC short mobility form. Current research shows that an AM-PAC score of 18 or greater is typically associated with a discharge to the patient's home setting. Based on the patient's AM-PAC score and their current functional mobility deficits, it is recommended that the patient have 2-3 sessions per week of Physical Therapy at d/c to increase the patient's independence. At this time, this patient demonstrates the endurance and safety to discharge home with home services and a follow up treatment frequency of 2-3x/wk. Please see assessment section for further patient specific details. If patient discharges prior to next session this note will serve as a discharge summary. Please see below for the latest assessment towards goals. HOME HEALTH CARE: LEVEL 3 SAFETY     - Initial home health evaluation to occur within 24-48 hours, in patient home   - Therapy evaluations in home within 24-48 hours of discharge; including DME and home safety   - Frontload therapy 5 days, then 3x a week   - Therapy to evaluate if patient has [address] needs for personal care   -  evaluation within 24-48 hours, includes evaluation of resources and insurance to determine [place], [place], LTC, and Medicaid options     Patient will benefit from 24 hour assistance in the home setting due to current balance impairments.      DME Required For Discharge: patient has all required DME for discharge  Precautions/Restrictions: high fall risk  Weight Bearing Restrictions: no restrictions  [] Right Upper Extremity  [] Left Upper Extremity [] Right Lower Extremity  [] Left Lower Extremity     Required Braces/Orthotics: no braces required   [] Right  [] Left  Positional Restrictions:no positional restrictions    Pre-Admission Information   Lives With: . Comment: 41 yo grandson \"Eddie\"  and spouse               Type of Home: house  Home Layout: one level  Home Access:  2 step to enter without rails  on porch, usually comes through garage w/o steps  Bathroom Layout: tub only  Bathroom Equipment: grab bars in shower, grab bars around toilet, shower chair, hand held shower head  Toilet Height: elevated height  Home Equipment: rollator - 4 wheeled walker, manual wheelchair, reacher  Transfer Assistance: Independent without use of device  Ambulation Assistance:modified independent with use of 4WW  at home, use electric cart within community  ADL Assistance: independent with all ADL's, requires assistance with dressing for socks  IADL Assistance: requires assistance with laundry, requires assistance with cleaning, requires assistance with yard work, requires assistance with driving/transportation  Active :        [] Yes                 [x] No  Hand Dominance: [] Left                 [] Right  Current Employment: retired. Occupation: administrative work at [email or web address]: small dog, reading, watch TV  Recent Falls: prior history of 4-5 falls within past year due to lifting dog and tripping on rugs but none within time frame of last hospital admission    Examination   Vision:   Vision Gross Assessment: Impaired and Vision Corrective Device: wears glasses for reading  Hearing:   hard of hearing--looking into purchasing hearing aides   Observation:   General Observation:  posterior lean in sitting requiring CGA-mod A to maintain balance. Kyphotic posture. Mild nystagmus in L eye with mobility, not aggravated by head turning.   Sensation:   WFL  ROM:   (B) LE AROM WFL  Strength:   (B) LE strength grossly [facility]  Therapist Clinical Decision Making (Complexity): low complexity  Clinical Presentation: stable      Subjective  General: Patient complains of dizziness upon entry but does not formally rate pain. RN notified. Patient is seated at EOB upon arrival.   Pain: Patient does not rate upon questioning  Pain Interventions: RN notified       Functional Mobility  Bed Mobility  Supine to Sit: stand by assistance  Comments: Patient notes dizziness without nausea in sitting--RN notified and administers medication  Transfers  Sit to stand transfer: contact guard assistance  Stand to sit transfer: contact guard assistance  Comments:  Ambulation  Surface:level surface  Assistive Device: rolling walker  Assistance: contact guard assistance  Distance: 20'  Gait Mechanics: posterior lean, FW flexed posturing, bilateral decreased step height, intermittent cues to maintain body within boundaries of RW  Comments:    Stair Mobility  Stair mobility not completed on this date. Comments:  Wheelchair Mobility:  No w/c mobility completed on this date. Comments:  Balance  Static Sitting Balance: poor: requires mod (A) to maintain balance  Dynamic Sitting Balance: poor: requires mod (A) to maintain balance  Static Standing Balance: fair (-): maintains balance at CGA with use of UE support  Dynamic Standing Balance: poor (+): requires min (A) to maintain balance  Comments:    Sitting balance fluctuates CGA-mod A while completing dressing due to posterior lean. Patient states that she has a difficult time finding midline positioning. Patient complains of dizziness, mild nystagmus noted in screening of (L) eye with R head turns and saccades noted. Patient notes increased dizziness with convergence testing, as well as rapid head movement while fixated on stationary target. At this time symptoms appear to be multi-factorial in nature and will benefit from outpatient vestibular assessment to determine etiology as well as proper treatment.       Other Therapeutic Interventions    Functional Outcomes Time In       1330   Time Out       1423   Minutes       53     Timed Code Treatment Minutes:  38 minutes    Total Treatment Minutes:  53 minutes         Electronically Signed By: Marisela Severs, [address] [facility] [address], DPT [id number]

## 2022-11-16 NOTE — PROGRESS NOTES
MD Micheline Vogel MD Ty Cox, MD                  Office: [phone]                      Fax: [phone]             [facility]                   NEPHROLOGY INPATIENT PROGRESS NOTE:     PATIENT NAME: Nevin Malone  : 1940  MRN: [id number]      RECOMMENDATIONS:     - Stopped Lasix 40 iv BID -   - fup w/ cardiology     - bills insertion needed for ARU   - urology consulted - \" Can dc bills , Str cath prn bl scan over 550 , Will follow     - fup UA as for hematuria on UA    - can plans for LHC if needed, as lower risk for CI-GIANA      - c.diff mx ongoing       D/C plan from renal stand point: Improving, resume home torsemide, 10 mg every , close follow-up with me,,   Continue to fup w/ in outpt   D/w team nurse hospitalist too  D/W patient, pt's daughter too     IMPRESSION:       Admitted on:  2022  5:07 PM   For:  NSTEMI (non-ST elevated myocardial infarction) ([facility] [I21.4]  Acute respiratory failure with hypoxia ([facility] [facility] [J96.01]      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 Weight in past around 116-119 pounds,   Lowest 113 lbs  Now - 116 lbs in office   At home - ~116 lbs  Now inpatient: 110 lbs- stable     : HTN : no need for tight control   : Na: WNL    - Azotemia: pre-renal  - Electrolytes: K: WNL  Mag WNL  - Acid-Base: acidosis - non AGMA   - Anemia: no         Other major problems: Management per primary and other consulting teams.          Hospital Problems             Last Modified POA    * (Principal) Acute respiratory failure with hypoxia (HCC) 11/11/2022 Yes    Essential hypertension, benign 11/12/2022 Yes    Chronic combined systolic (congestive) and diastolic (congestive) heart failure ([facility] [facility] 11/12/2022 Yes    NSTEMI (non-ST elevated myocardial infarction) ([facility] [address] 11/12/2022 Yes        Please refer to the orders. High Complexity. Multiple complex problems. Discussed with patient and treatment team-    Time spent > 30 (~35) minutes. Thank you for allowing me to participate in this patient's care. Please do not hesitate to contact me anytime. We will follow along with you. Eliezer Morin MD,  [facility] [address] [place] [address]: [phone] or [employer]  Fax: [phone]        =======================================================================================   =======================================================================================  Subjective / interval history:   Patient was seen comfortably  in the bed, aain today     Reported no active complaints,   No hypoxia  BP stable   Renal function better   No major diarrhea, even with C. difficile positive  I/O, weights reviewed     No current active complaints. Patient denied chest pain / dizziness/lightheadedness/syncope/ SOB / leg edema. .      Past medical, Surgical, Social, Family medical history reviewed by me.      PAST MEDICAL HISTORY:   Past Medical History:   Diagnosis Date    Allergic rhinitis, cause unspecified     Arthritis     Atrial fibrillation ([facility] [address] [facility]     Bronchopneumonia     CAD (coronary artery disease)     stent:  post cataract surgery (CABG)    Cerebral artery occlusion with cerebral infarction [facility])     TIA    Chronic gouty arthropathy     Chronic kidney disease     Congestive heart failure, unspecified     Degeneration of cervical intervertebral disc     Essential and other specified forms of tremor     Gout     HIGH CHOLESTEROL     History of CVA (cerebrovascular accident)     Hx of blood clots     Hypertension     Influenza 12/23/2017 Leakage of Watchman left atrial appendage closure device     Mitral valve stenosis and aortic valve insufficiency     Movement disorder     back problems    Pacemaker     Peptic ulcer, unspecified site, unspecified as acute or chronic, without mention of hemorrhage, perforation, or obstruction     Thyroid disease     Unspecified disorder of kidney and ureter     Unspecified hypothyroidism      PAST SURGICAL HISTORY:   Past Surgical History:   Procedure Laterality Date    BACK SURGERY      CARDIAC CATHETERIZATION  7/2012    CARDIAC CATHETERIZATION  08/07/2018    Unsuccesful  of RCA    CARDIAC SURGERY      CABG & Cardiac ablation    CHOLECYSTECTOMY      CORONARY ARTERY BYPASS GRAFT  1987    LIMA- Diag/LAD, SVG- RCA    FEMORAL BYPASS Left 8/22/2019    LEFT FEMORAL TO POPLITEAL BYPASS GRAFT performed by Dagoberto Kimble MD at [address] [place] [address] GRAFT N/A 8/22/2019    FEMORAL TO FEMORAL BYPASS performed by Dagoberto Kimble MD at [facility] Left 03/16/2017    Left hip pinning    IR KYPHOPLASTY LUMBAR FIRST LEVEL  5/14/2021    IR KYPHOPLASTY LUMBAR FIRST LEVEL 5/14/2021 [facility] SPECIAL PROCEDURES    JOINT REPLACEMENT      [place] NJX AA&/STRD TFRML EPI LUMBAR/SACRAL 1 LEVEL Right 12/3/2018    RIGHT L3 AND L4 LUMBAR TRANSFORAMINAL EPIRUAL STEROID INJECTION WITH FLUOROSCOPY performed by Skylar Wolf MD at [facility]    PTCA  11/04/2014    MARLENY - 3.0 x 28 to the Ist Diag    SHOULDER SURGERY      left     FAMILY HISTORY:   Family History   Problem Relation Age of Onset    Cancer Sister     Heart Disease Sister     Diabetes Brother     Hypertension Brother     Heart Disease Brother     Stroke Maternal Aunt     Heart Disease Maternal Aunt     Diabetes Maternal Uncle     Hypertension Paternal Aunt     Cancer Maternal Grandmother     Cancer Paternal Grandmother     Rheum Arthritis Neg Hx     Lupus Neg Hx      SOCIAL HISTORY:   Social History     Socioeconomic History    Marital status:   Spouse name: Phyllis Raymundo    Number of children: 3    Years of education: 12    Highest education level: None   Tobacco Use    Smoking status: Former     Packs/day: 1.00     Years: 28.00     Pack years: 28.00     Types: Cigarettes     Quit date: 1987     Years since quittin.8    Smokeless tobacco: Never    Tobacco comments:     H.O.smoking at age 15 / smoked up to 1 p.p.d / quit    Vaping Use    Vaping Use: Never used   Substance and Sexual Activity    Alcohol use: Yes     Comment: social    Drug use: No    Sexual activity: Not Currently     Social Determinants of Health     Financial Resource Strain: Low Risk     Difficulty of Paying Living Expenses: Not hard at all   Food Insecurity: No Food Insecurity    Worried About [email or web address] in the Last Year: Never true    [address] Payment in the Last Year: Never true   Transportation Needs: No Transportation Needs    Lack of Transportation (Medical): No    Lack of Transportation (Non-Medical): No   Physical Activity: Inactive    Days of Exercise per Week: 0 days    Minutes of Exercise per Session: 0 min   Stress: No Stress Concern Present    Feeling of Stress : Not at all   Social Connections: Socially Isolated    Frequency of Communication with Friends and Family: Three times a week    Frequency of Social Gatherings with Friends and Family: Three times a week    Attends Taoism Services: Never    Active Member of Clubs or Organizations: No    Attends Club or Organization Meetings: Never    Marital Status:    Housing Stability: Low Risk     Unable to Pay for Housing in the Last Year: No    Number of Places Lived in the Last Year: 1    Unstable Housing in the Last Year: No         MEDICATIONS: reviewed by me. Medications Prior to Admission:  No current facility-administered medications on file prior to encounter.      Current Outpatient Medications on File Prior to Encounter   Medication Sig Dispense Refill    HYDROcodone-acetaminophen (Memory [place]) 5325 MG per tablet Take 1 tablet by mouth 4 times daily as needed for Pain for up to 30 days. 120 tablet 0    carvedilol (COREG) 25 MG tablet Take 1 tablet by mouth 2 times daily (with meals) 180 tablet 3    rosuvastatin (CRESTOR) 5 MG tablet Take 1 tablet by mouth nightly 90 tablet 1    levothyroxine (SYNTHROID) 125 MCG tablet Take 1 tablet by mouth Daily 90 tablet 0    aspirin EC 81 MG EC tablet Take 1 tablet by mouth daily 90 tablet 1    topiramate (TOPAMAX) 50 MG tablet TAKE 2 TABLETS TWICE DAILY 360 tablet 0    torsemide (DEMADEX) 20 MG tablet 10 mg on MWF 30 tablet 3    albuterol sulfate HFA (VENTOLIN HFA) 108 (90 Base) MCG/ACT inhaler Inhale 2 puffs into the lungs 4 times daily as needed for Wheezing 18 g 0    vitamin D (ERGOCALCIFEROL) 1.25 MG (53354 UT) CAPS capsule TAKE 1 CAPSULE ONCE A WEEK 12 capsule 1    clopidogrel (PLAVIX) 75 MG tablet Take 1 tablet by mouth in the morning.  30 tablet 3    allopurinol (ZYLOPRIM) 100 MG tablet TAKE 1 TABLET EVERY DAY 90 tablet 1    nitroGLYCERIN (NITROLINGUAL) 0.4 MG/SPRAY 0.4 mg spray Place 1 spray under the tongue every 5 minutes as needed for Chest pain 4.9 g 1    fluticasone (FLONASE) 50 MCG/ACT nasal spray 2 sprays by Each Nostril route daily 16 g 0         Current Facility-Administered Medications:     loperamide (IMODIUM) capsule 2 mg, 2 mg, Oral, 4x Daily PRN, Tristan Salmon MD, 2 mg at 11/15/22 2026    vancomycin [facility]) 50 MG/ML oral solution 125 mg, 125 mg, Oral, 4 times per day, Sharmin Henderson PA-C, 125 mg at 11/16/22 0529    melatonin tablet 3 mg, 3 mg, Oral, Nightly PRN, TIFFANIE Welch CNP, 3 mg at 11/15/22 2026    albuterol sulfate HFA (PROVENTIL;VENTOLIN;PROAIR) 108 (90 Base) MCG/ACT inhaler 2 puff, 2 puff, Inhalation, 4x Daily PRN, TIFFANIE Welch CNP    allopurinol (ZYLOPRIM) tablet 100 mg, 100 mg, Oral, Daily, Elsie Gannon, APRN - CNP, 100 mg at 11/16/22 0906    aspirin EC tablet 81 mg, 81 mg, Oral, Daily, Madelin Ibarra, APRN - CNP, 81 mg at 11/16/22 0906    carvedilol (COREG) tablet 25 mg, 25 mg, Oral, BID WC, Elsie Yanetck, APRN - CNP, 25 mg at 11/16/22 0906    HYDROcodone-acetaminophen (NORCO) 5-325 MG per tablet 1 tablet, 1 tablet, Oral, 4x Daily PRN, Elsie Minick, APRN - CNP, 1 tablet at 11/14/22 1723    levothyroxine (SYNTHROID) tablet 125 mcg, 125 mcg, Oral, Daily, Elsie Minick, APRN - CNP, 125 mcg at 11/16/22 0530    rosuvastatin (CRESTOR) tablet 5 mg, 5 mg, Oral, Nightly, Elsie Minick, APRN - CNP, 5 mg at 11/15/22 2026    topiramate (TOPAMAX) tablet 50 mg, 50 mg, Oral, BID, Elsie Minick, APRN - CNP, 50 mg at 11/16/22 0906    sodium chloride flush 0.9 % injection 5-40 mL, 5-40 mL, IntraVENous, 2 times per day, Tim Johnson, APRN - CNP, 10 mL at 11/15/22 0842    sodium chloride flush 0.9 % injection 5-40 mL, 5-40 mL, IntraVENous, PRN, Elsie Yanetck, APRN - CNP, 10 mL at 11/12/22 2154    0.9 % sodium chloride infusion, , IntraVENous, PRN, Elsie Minick, APRN - CNP    ondansetron (ZOFRAN-ODT) disintegrating tablet 4 mg, 4 mg, Oral, Q8H PRN **OR** ondansetron (ZOFRAN) injection 4 mg, 4 mg, IntraVENous, Q6H PRN, Elsie Minick, APRN - CNP    polyethylene glycol (GLYCOLAX) packet 17 g, 17 g, Oral, Daily PRN, Elsie Minick, APRN - CNP, 17 g at 11/12/22 0559    acetaminophen (TYLENOL) tablet 650 mg, 650 mg, Oral, Q6H PRN **OR** acetaminophen (TYLENOL) suppository 650 mg, 650 mg, Rectal, Q6H PRN, Elsie Minick, APRN - CNP    perflutren lipid microspheres (DEFINITY) injection 1.5 mL, 1.5 mL, IntraVENous, ONCE PRN, Elsie Gannon, APRN - CNP    heparin (porcine) injection 3,010 Units, 60 Units/kg, IntraVENous, PRN, Elsie Gannon, APRN - CNP, 3,010 Units at 11/12/22 1420    heparin (porcine) injection 1,500 Units, 30 Units/kg, IntraVENous, PRN, Elsie Gannon, TIFFANIE - CNP, 1,500 Units at 11/15/22 2205    heparin 25,000 units in dextrose 5% 250 mL (premix) infusion, 5-30 Units/kg/hr, IntraVENous, Continuous, Tim Johsnon, APRN - CNP, Last Rate: 12 mL/hr at 11/15/22 2207, 24 Units/kg/hr at 11/15/22 2207    clopidogrel (PLAVIX) tablet 75 mg, 75 mg, Oral, Daily, TIFFANIE Welch - CNP, 75 mg at 11/16/22 6180    nitroGLYCERIN (NITROSTAT) SL tablet 0.4 mg, 0.4 mg, SubLINGual, Q5 Min PRN, Nolvia Pulido MD      Allergies reviewed by me: Aspirin, Diltiazem, Diltiazem hcl, Lorazepam, Sulfa antibiotics, Atorvastatin, Dabigatran, Mysoline [primidone], Nsaids, Other, and Primidone    REVIEW OF SYSTEMS:  As mentioned in chief complaint and HPI , Subjective   All other 10-point review of systems: negative.          =======================================================================================     PHYSICAL EXAM:  Recent vital signs and recent I/Os reviewed by me. Wt Readings from Last 3 Encounters:   11/16/22 111 lb 12.8 oz (50.7 kg)   11/07/22 114 lb 6.4 oz (51.9 kg)   10/28/22 116 lb (52.6 kg)     BP Readings from Last 3 Encounters:   11/16/22 131/64   11/07/22 120/70   10/28/22 122/70     Patient Vitals for the past 24 hrs:   BP Temp Temp src Pulse Resp SpO2 Weight   11/16/22 0724 131/64 97.8 °F (36.6 °C) Oral 77 18 98 % --   11/16/22 0307 129/68 97.6 °F (36.4 °C) Oral 70 16 96 % 111 lb 12.8 oz (50.7 kg)   11/16/22 0017 125/68 97.5 °F (36.4 °C) Oral 70 16 98 % --   11/15/22 1858 (!) 125/54 97.6 °F (36.4 °C) Oral 70 16 96 % --   11/15/22 1552 123/64 97.9 °F (36.6 °C) Oral 77 16 98 % --   11/15/22 1151 138/75 97.3 °F (36.3 °C) Oral 71 18 100 % --         Intake/Output Summary (Last 24 hours) at 11/16/2022 1112  Last data filed at 11/16/2022 0904  Gross per 24 hour   Intake 1645.35 ml   Output --   Net 1645.35 ml             Physical Exam  Vitals reviewed. Constitutional:       General: She is not in acute distress. Appearance: Normal appearance. She is ill-appearing. HENT:      Head: Normocephalic and atraumatic.       Right Ear: External ear normal.      Left Ear: External ear normal.      Nose: Nose normal.      Mouth/Throat: Mouth: Mucous membranes are moist. Mucous membranes are not dry. Eyes:      General: No scleral icterus. Conjunctiva/sclera: Conjunctivae normal.   Neck:      Vascular: No JVD. Cardiovascular:      Rate and Rhythm: Normal rate and regular rhythm. Heart sounds: S1 normal and S2 normal.   Pulmonary:      Effort: Respiratory distress (mild) present. Breath sounds: Rhonchi present. Abdominal:      General: Bowel sounds are normal. There is distension. Tenderness: There is no abdominal tenderness. Musculoskeletal:         General: Swelling present. No deformity. Cervical back: Normal range of motion and neck supple. Skin:     General: Skin is dry. Coloration: Skin is not jaundiced. Neurological:      Mental Status: She is alert and oriented to person, place, and time. Mental status is at baseline. Psychiatric:         Mood and Affect: Mood normal.         Behavior: Behavior normal.        =======================================================================================     DATA:  Diagnostic tests reviewed by me for today's visit:   (AS NEEDED FOR MY EVALUATION AND MANAGEMENT). Recent Labs     11/14/22  0539 11/15/22  0519   WBC 7.5 6.4   HCT 38.7 39.7    302       Iron Saturation:  No components found for: PERCENTFE  FERRITIN:  No results found for: FERRITIN  IRON:    Lab Results   Component Value Date/Time    IRON 54 11/09/2020 11:05 AM     TIBC:    Lab Results   Component Value Date/Time    TIBC 216 11/09/2020 11:05 AM       Recent Labs     11/14/22  0539 11/15/22  0519 11/16/22  0434    143 144   K 3.3* 4.2 3.9    112* 116*   CO2 22 22 17*   BUN 52* 40* 29*   CREATININE 1.8* 1.4* 1.1       Recent Labs     11/14/22  0539 11/15/22  0519 11/16/22  0434   CALCIUM 8.9 8.5 8.9   MG 2.20 2.10 2.10   PHOS 3.2 2.8  --        No results for input(s): PH, PCO2, PO2 in the last 72 hours.     Invalid input(s): [id number], INSPIREDO2    ABG:  No results found for: PH, PCO2, PO2, HCO3, BE, THGB, TCO2, O2SAT  VBG:    Lab Results   Component Value Date/Time    PHVEN 7.303 09/15/2022 11:04 AM    UBG3DPK 38.8 09/15/2022 11:04 AM    BEVEN -6.7 09/15/2022 11:04 AM    Q5GYSDRI 97 09/15/2022 11:04 AM       LDH:  No results found for: LDH  Uric Acid:    Lab Results   Component Value Date/Time    LABURIC 4.8 05/28/2019 12:31 PM       PT/INR:    Lab Results   Component Value Date/Time    PROTIME 15.9 11/12/2022 02:35 AM    INR 1.27 11/12/2022 02:35 AM     Warfarin PT/INR:  No components found for: PTPATWAR, PTINRWAR  PTT:    Lab Results   Component Value Date/Time    APTT 42.5 01/22/2022 02:16 PM   [APTT}  Last 3 Troponin:    Lab Results   Component Value Date/Time    TROPONINI 0.09 11/13/2022 12:15 PM    TROPONINI 0.11 11/12/2022 12:11 PM    TROPONINI 0.17 11/12/2022 04:33 AM       U/A:    Lab Results   Component Value Date/Time    COLORU Yellow 11/12/2022 06:06 PM    PROTEINU Negative 11/12/2022 06:06 PM    PHUR 5.0 11/12/2022 06:06 PM    WBCUA 2 11/12/2022 06:06 PM    RBCUA 1 11/12/2022 06:06 PM    YEAST neg 03/09/2021 02:00 PM    BACTERIA None Seen 11/12/2022 06:06 PM    CLARITYU Clear 11/12/2022 06:06 PM    SPECGRAV 1.020 11/12/2022 06:06 PM    LEUKOCYTESUR TRACE 11/12/2022 06:06 PM    UROBILINOGEN 0.2 11/12/2022 06:06 PM    BILIRUBINUR Negative 11/12/2022 06:06 PM    BILIRUBINUR NEGATIVE 12/04/2011 06:40 PM    BLOODU TRACE 11/12/2022 06:06 PM    GLUCOSEU Negative 11/12/2022 06:06 PM    GLUCOSEU NEGATIVE 12/04/2011 06:40 PM     Microalbumen/Creatinine ratio:  No components found for: RUCREAT  24 Hour Urine for Protein:  No components found for: RAWUPRO, UHRS3, EIRE92LT, UTV3  24 Hour Urine for Creatinine Clearance:  No components found for: CREAT4, UHRS10, UTV10  Urine Toxicology:  No components found for: Nikkievaristo Springer, IBENZO, ICOCAINE, IMARTHC, IOPIATES, IPHENCYC    HgBA1c:    Lab Results   Component Value Date/Time    LABA1C 5.5 01/23/2022 04:22 AM     RPR:  No results found for: RPR  HIV:  No results found for: HIV  REMY:  No results found for: ANATITER, REMY  RF:  No results found for: RF  DSDNA:  No components found for: DNA  AMYLASE:  No results found for: AMYLASE  LIPASE:    Lab Results   Component Value Date/Time    LIPASE 25.0 09/14/2022 07:11 PM     Fibrinogen Level:  No components found for: FIB       BELOW MENTIONED RADIOLOGY STUDY RESULTS BY ME (AS NEEDED FOR MY EVALUATION AND MANAGEMENT). XR CHEST PORTABLE    Result Date: 11/11/2022  EXAMINATION: ONE X-RAY VIEW OF THE CHEST 11/11/2022 5:33 pm COMPARISON: September 10, 2022 HISTORY: ORDERING SYSTEM PROVIDED HISTORY:  Chest pain no fever or cough. TECHNOLOGIST PROVIDED HISTORY: Reason for Exam:  Chest pain no fever or cough. FINDINGS: The cardiomediastinal silhouette is markedly enlarged. Pacer leads are stable in position. Vascular congestive changes are present with mild interstitial pulmonary edema. No evidence of lobar infiltrate. No pleural effusion or pneumothorax. Scarring at the left costophrenic angle is grossly stable. Severe cardiomegaly, with vascular congestive changes and mild interstitial pulmonary edema. CT CHEST PULMONARY EMBOLISM W CONTRAST    Result Date: 11/11/2022  EXAMINATION: CTA OF THE CHEST 11/11/2022 7:53 pm TECHNIQUE: CTA of the chest was performed after the administration of intravenous contrast.  Multiplanar reformatted images are provided for review. MIP images are provided for review. Automated exposure control, iterative reconstruction, and/or weight based adjustment of the mA/kV was utilized to reduce the radiation dose to as low as reasonably achievable.  COMPARISON: 09/15/2022, and 05/18/2021 HISTORY: ORDERING SYSTEM PROVIDED HISTORY: chest pain, dyspnea TECHNOLOGIST PROVIDED HISTORY: Reason for exam:->chest pain, dyspnea Decision Support Exception - unselect if not a suspected or confirmed emergency medical condition->Emergency Medical Condition (MA) Reason for Exam: chest pain, dyspnea Relevant Medical/Surgical History: Shortness of Breath (From home by [facility] [place]. Had 2 stents placed this week) FINDINGS: Pulmonary Arteries: The pulmonary arteries are adequately opacified for evaluation. Evaluation is limited by respiratory motion. There is no evidence of acute pulmonary embolus to the segmental level. Stable enlargement of the main pulmonary artery which can be seen in the setting of pulmonary artery hypertension. Mediastinum: Diffuse ectasia of the ascending aorta measuring up to 4.2 cm may be mildly increased since the comparison study when it measured up to 3.8 cm. Unchanged cardiomegaly with an implanted cardiac device in place. Coronary artery disease. The patient is status post median sternotomy. Moderate calcified atherosclerotic plaque. A left atrial appendage Watchman device is in place. Mildly prominent right hilar lymph nodes are not significantly changed. No lymphadenopathy by size criteria. Lungs/pleura: Small right and trace left pleural effusions. Left pleural calcifications which could indicate asbestos related pleural disease. Areas of bilateral septal thickening and perihilar ground-glass attenuation, more prominent on the right and most likely reflective of pulmonary edema. No pneumothorax. Scattered calcified granulomas. Within the limitations of respiratory motion, there are no suspicious pulmonary nodules. Upper Abdomen: Limited images of the upper abdomen demonstrate reflux of contrast into the hepatic veins which can be seen in the setting of right heart failure. No acute upper abdominal findings. Soft Tissues/Bones: No acute bone or soft tissue abnormality. 1. Motion limited evaluation with no evidence of acute pulmonary embolism to the segmental level. 2. Pulmonary edema with small right and trace left pleural effusions. 3. Reflux of contrast into the hepatic veins can be seen in the setting of right heart failure.  4. Unchanged enlargement of main pulmonary artery which can be seen in the setting of pulmonary artery hypertension. 5. Chronic findings outlined in body of the report. This report was transcribed using voice recognition software, mainly. So please excuse brevity and/or typos. Every effort was made to ensure accuracy, however, inadvertent computerized transcription errors may be present. Please contact us, if any questions or clarifications are needed.

## 2022-11-16 NOTE — DISCHARGE SUMMARY
Hospital Medicine Discharge Summary    Patient ID: [facility]      Patient's PCP: Lorena Carcamo MD    Admit Date: 11/11/2022     Discharge Date:   11/16/2022    Admitting Provider: Kristin Florian MD     Discharge Provider: Annie Chow MD     Discharge Diagnoses: Active Hospital Problems    Diagnosis     Acute respiratory failure with hypoxia (HCC) [J96.01]      Priority: Medium    NSTEMI (non-ST elevated myocardial infarction) ([facility] [address] [I21.4]     Chronic combined systolic (congestive) and diastolic (congestive) heart failure ([address] [I50.42]     Essential hypertension, benign [I10]        The patient was seen and examined on day of discharge and this discharge summary is in conjunction with any daily progress note from day of discharge. Hospital Course:     80 y.o. female with history of A. 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 In the ED troponin was elevated at 0.18 and EKG without acute ST-T changes. Started on heparin drip for non-STEMI. Initially diuresed with IV Lasix. Echocardiogram showed EF 45 to 55% EF. Creatinine up trended with diuresis. Angiogram was deferred secondary to GIANA. Patient has baseline CKD stage III. Nephrology consulted. Lasix held. Serum creatinine improved to baseline. Patient was found to have C. difficile infection. Treated with p.o. vancomycin. Overall improved. Aortic arch ectasia: Needs vascular follow-up      Acute urinary retention: Straight cath as needed. urology on board. Paroxysmal atrial fibrillation Status post watchman:  Coreg     Hyperlipidemia: Statin     Deconditioning: PT OT consulted. -Sent home in stable condition with vancomycin p.o.   Advised follow up with PCP in 1 week. Physical Exam Performed:     BP (!) 148/72   Pulse 71   Temp 97.4 °F (36.3 °C) (Oral)   Resp 17   Ht 5' 3\" (1.6 m)   Wt 111 lb 12.8 oz (50.7 kg)   SpO2 98%   BMI 19.80 kg/m²       General appearance:  No apparent distress, appears stated age and cooperative. HEENT:  Normal cephalic, atraumatic without obvious deformity. Pupils equal, round, and reactive to light. Extra ocular muscles intact. Conjunctivae/corneas clear. Neck: Supple, with full range of motion. No jugular venous distention. Trachea midline. Respiratory:  Normal respiratory effort. Clear to auscultation, bilaterally without Rales/Wheezes/Rhonchi. Cardiovascular:  Regular rate and rhythm with normal S1/S2 without murmurs, rubs or gallops. Abdomen: Soft, non-tender, non-distended with normal bowel sounds. Musculoskeletal:  No clubbing, cyanosis or edema bilaterally. Full range of motion without deformity. Skin: Skin color, texture, turgor normal.  No rashes or lesions. Neurologic:  Neurovascularly intact without any focal sensory/motor deficits. Cranial nerves: II-XII intact, grossly non-focal.  Psychiatric:  Alert and oriented, thought content appropriate, normal insight  Capillary Refill: Brisk,< 3 seconds   Peripheral Pulses: +2 palpable, equal bilaterally       Labs:  For convenience and continuity at follow-up the following most recent labs are provided:      CBC:    Lab Results   Component Value Date/Time    WBC 6.4 11/15/2022 05:19 AM    HGB 11.9 11/15/2022 05:19 AM    HCT 39.7 11/15/2022 05:19 AM     11/15/2022 05:19 AM       Renal:    Lab Results   Component Value Date/Time     11/16/2022 04:34 AM    K 3.9 11/16/2022 04:34 AM    K 3.5 11/12/2022 04:33 AM     11/16/2022 04:34 AM    CO2 17 11/16/2022 04:34 AM    BUN 29 11/16/2022 04:34 AM    CREATININE 1.1 11/16/2022 04:34 AM    CALCIUM 8.9 11/16/2022 04:34 AM    PHOS 2.8 11/15/2022 05:19 AM         Significant Diagnostic Studies    Radiology:   CT CHEST PULMONARY EMBOLISM W CONTRAST   Final Result   1. Motion limited evaluation with no evidence of acute pulmonary embolism to   the segmental level. 2. Pulmonary edema with small right and trace left pleural effusions. 3. Reflux of contrast into the hepatic veins can be seen in the setting of   right heart failure. 4. Unchanged enlargement of main pulmonary artery which can be seen in the   setting of pulmonary artery hypertension. 5. Suspected small increase in the size of ascending aortic ectasia which now   measures 4.2 cm and previously measured 3.8 cm. Consider nonemergent   vascular consultation. 6. Chronic findings outlined in body of the report. XR CHEST PORTABLE   Final Result   Severe cardiomegaly, with vascular congestive changes and mild interstitial   pulmonary edema. Consults:     IP CONSULT TO CARDIOLOGY  IP CONSULT TO CARDIOLOGY  IP CONSULT TO SPIRITUAL SERVICES  IP CONSULT TO SOCIAL WORK  IP CONSULT TO NEPHROLOGY  IP CONSULT TO UROLOGY  IP CONSULT TO DIETITIAN    Disposition:  home      Condition at Discharge: Stable    Discharge Instructions/Follow-up:  PCP and cardiology follow up    Code Status:  Full Code     Activity: activity as tolerated    Diet: cardiac diet      Discharge Medications:     Current Discharge Medication List             Details   vancomycin (FIRVANQ) 50 MG/ML SOLR oral solution Take 2.5 mLs by mouth every 6 hours for 11 days  Qty: 105 mL, Refills: 0                Details   HYDROcodone-acetaminophen (NORCO) 5-325 MG per tablet Take 1 tablet by mouth 4 times daily as needed for Pain for up to 30 days.   Qty: 120 tablet, Refills: 0    Comments: Reduce doses taken as pain becomes manageable  Associated Diagnoses: Primary osteoarthritis involving multiple joints      carvedilol (COREG) 25 MG tablet Take 1 tablet by mouth 2 times daily (with meals)  Qty: 180 tablet, Refills: 3      rosuvastatin (CRESTOR) 5 MG tablet Take 1 tablet by mouth nightly  Qty: 90 tablet, Refills: 1      levothyroxine (SYNTHROID) 125 MCG tablet Take 1 tablet by mouth Daily  Qty: 90 tablet, Refills: 0      aspirin EC 81 MG EC tablet Take 1 tablet by mouth daily  Qty: 90 tablet, Refills: 1      topiramate (TOPAMAX) 50 MG tablet TAKE 2 TABLETS TWICE DAILY  Qty: 360 tablet, Refills: 0      torsemide (DEMADEX) 20 MG tablet 10 mg on MWF  Qty: 30 tablet, Refills: 3      albuterol sulfate HFA (VENTOLIN HFA) 108 (90 Base) MCG/ACT inhaler Inhale 2 puffs into the lungs 4 times daily as needed for Wheezing  Qty: 18 g, Refills: 0      vitamin D (ERGOCALCIFEROL) 1.25 MG (86519 UT) CAPS capsule TAKE 1 CAPSULE ONCE A WEEK  Qty: 12 capsule, Refills: 1      clopidogrel (PLAVIX) 75 MG tablet Take 1 tablet by mouth in the morning. Qty: 30 tablet, Refills: 3      allopurinol (ZYLOPRIM) 100 MG tablet TAKE 1 TABLET EVERY DAY  Qty: 90 tablet, Refills: 1      nitroGLYCERIN (NITROLINGUAL) 0.4 MG/SPRAY 0.4 mg spray Place 1 spray under the tongue every 5 minutes as needed for Chest pain  Qty: 4.9 g, Refills: 1      fluticasone (FLONASE) 50 MCG/ACT nasal spray 2 sprays by Each Nostril route daily  Qty: 16 g, Refills: 0             Time Spent on discharge is more than 30 minutes in the examination, evaluation, counseling and review of medications and discharge plan. Signed:    Lilliam Churchill MD   11/16/2022      Thank you Simon Borges MD for the opportunity to be involved in this patient's care. If you have any questions or concerns, please feel free to contact me at [phone].

## 2022-11-17 ENCOUNTER — CLINICAL DOCUMENTATION ONLY (OUTPATIENT)
Facility: CLINIC | Age: 82
End: 2022-11-17

## 2022-11-17 LAB — CALPROTECTIN, FECAL: 11 UG/G

## 2022-11-21 ENCOUNTER — TELEPHONE (OUTPATIENT)
Dept: FAMILY MEDICINE CLINIC | Age: 82
End: 2022-11-21

## 2022-11-21 NOTE — TELEPHONE ENCOUNTER
----- Message from Fran Catherine sent at 11/21/2022  1:16 PM EST -----  Subject: Hospital Follow Up    QUESTIONS  [email or web address]  Date of Discharge? 2022-11-16  Discharge Location? Home  Reason for hospitalization as patient stated? patient is requesting a   sooner appointment than the one she has on Dec 21, please call her with an   earlier date and time   What question does the patient have, if applicable?   ---------------------------------------------------------------------------  --------------  CALL BACK INFO  What is the best way for the office to contact you? Do not leave any   message, patient will call back for answer  Preferred Call Back Phone Number? [phone]  ---------------------------------------------------------------------------  --------------  SCRIPT ANSWERS  Relationship to Patient?  Self

## 2022-11-23 RX ORDER — CLOPIDOGREL BISULFATE 75 MG/1
75 TABLET ORAL DAILY
Qty: 90 TABLET | Refills: 2 | Status: SHIPPED | OUTPATIENT
Start: 2022-11-23 | End: 2022-11-25 | Stop reason: SDUPTHER

## 2022-11-23 NOTE — TELEPHONE ENCOUNTER
Received refill request for Plavix from [address] : 11/7/22 npsr    Last Labs : 11/16/22 cmp     Last Refill : 7/20/22 30w3     Next OV : 11/29/22 nprg

## 2022-11-25 RX ORDER — CLOPIDOGREL BISULFATE 75 MG/1
75 TABLET ORAL DAILY
Qty: 90 TABLET | Refills: 2 | Status: SHIPPED | OUTPATIENT
Start: 2022-11-25

## 2022-11-25 NOTE — TELEPHONE ENCOUNTER
Patient daughter called in stated that her mother picked up her RX of Plavix and has lost it between the pharmacy and home. Patient sis asking if she can get another RX sent to the pharmacy. Patient is out of medication.

## 2022-11-29 ENCOUNTER — OFFICE VISIT (OUTPATIENT)
Dept: FAMILY MEDICINE CLINIC | Age: 82
End: 2022-11-29
Payer: MEDICARE

## 2022-11-29 ENCOUNTER — OFFICE VISIT (OUTPATIENT)
Dept: CARDIOLOGY CLINIC | Age: 82
End: 2022-11-29
Payer: MEDICARE

## 2022-11-29 VITALS
OXYGEN SATURATION: 97 % | HEART RATE: 71 BPM | HEIGHT: 63 IN | DIASTOLIC BLOOD PRESSURE: 46 MMHG | WEIGHT: 116.2 LBS | SYSTOLIC BLOOD PRESSURE: 128 MMHG | BODY MASS INDEX: 20.59 KG/M2

## 2022-11-29 VITALS
SYSTOLIC BLOOD PRESSURE: 130 MMHG | DIASTOLIC BLOOD PRESSURE: 60 MMHG | TEMPERATURE: 97.1 F | HEART RATE: 70 BPM | OXYGEN SATURATION: 98 % | RESPIRATION RATE: 12 BRPM | WEIGHT: 115.38 LBS | BODY MASS INDEX: 20.44 KG/M2

## 2022-11-29 DIAGNOSIS — G60.9 IDIOPATHIC PERIPHERAL NEUROPATHY: ICD-10-CM

## 2022-11-29 DIAGNOSIS — I25.10 CORONARY ARTERY DISEASE DUE TO LIPID RICH PLAQUE: ICD-10-CM

## 2022-11-29 DIAGNOSIS — N28.9 RENAL INSUFFICIENCY: ICD-10-CM

## 2022-11-29 DIAGNOSIS — I21.4 NSTEMI (NON-ST ELEVATED MYOCARDIAL INFARCTION) (HCC): Primary | ICD-10-CM

## 2022-11-29 DIAGNOSIS — M15.9 PRIMARY OSTEOARTHRITIS INVOLVING MULTIPLE JOINTS: ICD-10-CM

## 2022-11-29 DIAGNOSIS — I50.22 CHRONIC SYSTOLIC HEART FAILURE (HCC): Primary | ICD-10-CM

## 2022-11-29 DIAGNOSIS — I50.42 CHRONIC COMBINED SYSTOLIC (CONGESTIVE) AND DIASTOLIC (CONGESTIVE) HEART FAILURE (HCC): ICD-10-CM

## 2022-11-29 DIAGNOSIS — N18.32 CHRONIC RENAL FAILURE, STAGE 3B (HCC): ICD-10-CM

## 2022-11-29 DIAGNOSIS — I25.83 CORONARY ARTERY DISEASE DUE TO LIPID RICH PLAQUE: ICD-10-CM

## 2022-11-29 DIAGNOSIS — I48.20 CHRONIC ATRIAL FIBRILLATION (HCC): ICD-10-CM

## 2022-11-29 DIAGNOSIS — I48.0 PAF (PAROXYSMAL ATRIAL FIBRILLATION) (HCC): ICD-10-CM

## 2022-11-29 PROBLEM — J96.01 ACUTE RESPIRATORY FAILURE WITH HYPOXIA (HCC): Status: RESOLVED | Noted: 2022-11-11 | Resolved: 2022-11-29

## 2022-11-29 PROBLEM — D64.9 ANEMIA: Status: RESOLVED | Noted: 2020-04-30 | Resolved: 2022-11-29

## 2022-11-29 PROBLEM — E46 PROTEIN CALORIE MALNUTRITION (HCC): Status: ACTIVE | Noted: 2022-11-29

## 2022-11-29 PROCEDURE — G8399 PT W/DXA RESULTS DOCUMENT: HCPCS | Performed by: FAMILY MEDICINE

## 2022-11-29 PROCEDURE — 1090F PRES/ABSN URINE INCON ASSESS: CPT | Performed by: FAMILY MEDICINE

## 2022-11-29 PROCEDURE — G8484 FLU IMMUNIZE NO ADMIN: HCPCS | Performed by: CLINICAL NURSE SPECIALIST

## 2022-11-29 PROCEDURE — 1111F DSCHRG MED/CURRENT MED MERGE: CPT | Performed by: CLINICAL NURSE SPECIALIST

## 2022-11-29 PROCEDURE — 99215 OFFICE O/P EST HI 40 MIN: CPT | Performed by: FAMILY MEDICINE

## 2022-11-29 PROCEDURE — 3074F SYST BP LT 130 MM HG: CPT | Performed by: FAMILY MEDICINE

## 2022-11-29 PROCEDURE — G8427 DOCREV CUR MEDS BY ELIG CLIN: HCPCS | Performed by: FAMILY MEDICINE

## 2022-11-29 PROCEDURE — 1090F PRES/ABSN URINE INCON ASSESS: CPT | Performed by: CLINICAL NURSE SPECIALIST

## 2022-11-29 PROCEDURE — 3078F DIAST BP <80 MM HG: CPT | Performed by: FAMILY MEDICINE

## 2022-11-29 PROCEDURE — 3078F DIAST BP <80 MM HG: CPT | Performed by: CLINICAL NURSE SPECIALIST

## 2022-11-29 PROCEDURE — G8484 FLU IMMUNIZE NO ADMIN: HCPCS | Performed by: FAMILY MEDICINE

## 2022-11-29 PROCEDURE — 1123F ACP DISCUSS/DSCN MKR DOCD: CPT | Performed by: FAMILY MEDICINE

## 2022-11-29 PROCEDURE — 1036F TOBACCO NON-USER: CPT | Performed by: CLINICAL NURSE SPECIALIST

## 2022-11-29 PROCEDURE — G8420 CALC BMI NORM PARAMETERS: HCPCS | Performed by: FAMILY MEDICINE

## 2022-11-29 PROCEDURE — 3074F SYST BP LT 130 MM HG: CPT | Performed by: CLINICAL NURSE SPECIALIST

## 2022-11-29 PROCEDURE — 1123F ACP DISCUSS/DSCN MKR DOCD: CPT | Performed by: CLINICAL NURSE SPECIALIST

## 2022-11-29 PROCEDURE — G8420 CALC BMI NORM PARAMETERS: HCPCS | Performed by: CLINICAL NURSE SPECIALIST

## 2022-11-29 PROCEDURE — G8399 PT W/DXA RESULTS DOCUMENT: HCPCS | Performed by: CLINICAL NURSE SPECIALIST

## 2022-11-29 PROCEDURE — G8427 DOCREV CUR MEDS BY ELIG CLIN: HCPCS | Performed by: CLINICAL NURSE SPECIALIST

## 2022-11-29 PROCEDURE — 99214 OFFICE O/P EST MOD 30 MIN: CPT | Performed by: CLINICAL NURSE SPECIALIST

## 2022-11-29 PROCEDURE — 1036F TOBACCO NON-USER: CPT | Performed by: FAMILY MEDICINE

## 2022-11-29 PROCEDURE — 1111F DSCHRG MED/CURRENT MED MERGE: CPT | Performed by: FAMILY MEDICINE

## 2022-11-29 RX ORDER — TORSEMIDE 20 MG/1
TABLET ORAL
Qty: 60 TABLET | Refills: 1 | Status: SHIPPED | OUTPATIENT
Start: 2022-11-29

## 2022-11-29 NOTE — PROGRESS NOTES
[facility]  Progress Note    Primary Care Doctor:  Pili Rg MD    Chief Complaint   Patient presents with    Congestive Heart Failure        History of Present Illness:  80 y.o. female with history of Ms. Tavon Gore She has a history of atrial fibrillation (on xarelto), TIA, chronic kidney disease, CHF, hypertension, hyperlipidemia, mitral valve disease, hypothyroidism. Her last LVEF was 45% on 7/19/19. 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 Plan is for a PCI in about 30 days  10/19-21/2022 for LHC with PCI to LM and cx, she was given 40 mg IV lasix for bnp of 14K (baseline is 3-4000) and BP extremely elevated, started on coreg. She had complex tachycardia vs AT and has a MCOT monitor. I had the pleasure of seeing Michele Quintero in follow up for systolic heart failure. She is in a wheel chair and with her daughter. She had a bad night and had palpitations with shortness of breath. Her daughter gave her an extra torsemide which helped some. She is only taking her coreg once a day, not sure why. All the notes show twice a day. Her weight is stable at 116. She does have some ankle edema R>L. She has follow up with nephrology.       Past Medical History:   has a past medical history of Allergic rhinitis, cause unspecified, Arthritis, Atrial fibrillation ([address] Bronchopneumonia, CAD (coronary artery disease), Cerebral artery occlusion with cerebral infarction [facility]), Chronic gouty arthropathy, Chronic kidney disease, Congestive heart failure, unspecified, Degeneration of cervical intervertebral disc, Essential and other specified forms of tremor, Gout, HIGH CHOLESTEROL, History of CVA (cerebrovascular accident), Hx of blood clots, Hypertension, Influenza, Leakage of Watchman left atrial appendage closure device, Mitral valve stenosis and aortic valve insufficiency, Movement disorder, Pacemaker, Peptic ulcer, unspecified site, unspecified as acute or chronic, without mention of hemorrhage, perforation, or obstruction, Thyroid disease, Unspecified disorder of kidney and ureter, and Unspecified hypothyroidism. Surgical History:   has a past surgical history that includes back surgery; Cholecystectomy; Cardiac surgery; Coronary artery bypass graft (1987); shoulder surgery; Cardiac catheterization (7/2012); hip surgery (Left, 03/16/2017); joint replacement; Cardiac catheterization (08/07/2018); Percutaneous Transluminal Coronary Angio (11/04/2014); pr njx aa&/strd tfrml epi lumbar/sacral 1 level (Right, 12/3/2018); Femoral-femoral Bypass Graft (N/A, 8/22/2019); femoral bypass (Left, 8/22/2019); and IR KYPHOPLASTY LUMBAR 1 VERTEBRAL BODY (5/14/2021). Social History:   reports that she quit smoking about 35 years ago. Her smoking use included cigarettes. She has a 28.00 pack-year smoking history. She has never used smokeless tobacco. She reports current alcohol use. She reports that she does not use drugs. Family History:   Family History   Problem Relation Age of Onset    Cancer Sister     Heart Disease Sister     Diabetes Brother     Hypertension Brother     Heart Disease Brother     Stroke Maternal Aunt     Heart Disease Maternal Aunt     Diabetes Maternal Uncle     Hypertension Paternal Aunt     Cancer Maternal Grandmother     Cancer Paternal Grandmother     Rheum Arthritis Neg Hx     Lupus Neg Hx        Home Medications:  Prior to Admission medications    Medication Sig Start Date End Date Taking?  Authorizing Provider   clopidogrel (PLAVIX) 75 MG tablet Take 1 tablet by mouth daily 11/25/22  Yes Maryana Boykin MD   HYDROcodone-acetaminophen (NORCO) 5-325 MG per tablet Take 1 tablet by mouth 4 times daily as needed for Pain for up to 30 days. 11/10/22 12/10/22 Yes Neris Sexton MD   carvedilol (COREG) 25 MG tablet Take 1 tablet by mouth 2 times daily (with meals)  Patient taking differently: Take 25 mg by mouth daily 11/7/22  Yes Sukhjinder Alcantara APRN - CNP   rosuvastatin (CRESTOR) 5 MG tablet Take 1 tablet by mouth nightly 11/7/22  Yes Sukhjinder Alcantara, APRN - CNP   levothyroxine (SYNTHROID) 125 MCG tablet Take 1 tablet by mouth Daily 11/7/22  Yes Sukhjinder Alcantara, APRN - CNP   aspirin EC 81 MG EC tablet Take 1 tablet by mouth daily 10/17/22  Yes Jessy Way MD   topiramate (TOPAMAX) 50 MG tablet TAKE 2 TABLETS TWICE DAILY 10/10/22  Yes Luda Canales MD   torsemide (DEMADEX) 20 MG tablet 10 mg on MWF  Patient taking differently: Take 20 mg by mouth three times a week [facility] 9/21/22  Yes Jannet Chong APRN - CNS   vitamin D (ERGOCALCIFEROL) 1.25 MG (36575 UT) CAPS capsule TAKE 1 CAPSULE ONCE A WEEK 8/3/22  Yes Luda Canales MD   allopurinol (ZYLOPRIM) 100 MG tablet TAKE 1 TABLET EVERY DAY 7/13/22  Yes Luda Canales MD   nitroGLYCERIN (NITROLINGUAL) 0.4 MG/SPRAY 0.4 mg spray Place 1 spray under the tongue every 5 minutes as needed for Chest pain 2/8/22  Yes Luda Canales MD   fluticasone [facility]) 50 MCG/ACT nasal spray 2 sprays by Each Nostril route daily 1/25/22  Yes TIFFANIE Cummings - CNP        Allergies:  Aspirin, Diltiazem, Diltiazem hcl, Lorazepam, Sulfa antibiotics, Atorvastatin, Dabigatran, Mysoline [primidone], Nsaids, Other, and Primidone     Review of Systems:   Constitutional: there has been no unanticipated weight loss. There's been no change in energy level, sleep pattern, or activity level. Eyes: No visual changes or diplopia. No scleral icterus. ENT: No Headaches, hearing loss or vertigo. No mouth sores or sore throat. Cardiovascular: Reviewed in HPI  Respiratory: No cough or wheezing, no sputum production. No hematemesis.     Gastrointestinal: No abdominal pain, appetite loss, blood in stools. No change in bowel or bladder habits. States she feels bloated   Genitourinary: No dysuria, trouble voiding, or hematuria. Musculoskeletal:  No gait disturbance, weakness or joint complaints. Integumentary: No rash or pruritis. Neurological: No headache, diplopia, change in muscle strength, numbness or tingling. No change in gait, balance, coordination, mood, affect, memory, mentation, behavior. Psychiatric: No anxiety, no depression. Endocrine: No malaise, fatigue or temperature intolerance. No excessive thirst, fluid intake, or urination. No tremor. Hematologic/Lymphatic: No abnormal bruising or bleeding, blood clots or swollen lymph nodes. Allergic/Immunologic: No nasal congestion or hives. Physical Examination:    Vitals:    11/29/22 1451   BP: (!) 128/46   Pulse: 71   SpO2: 97%   Weight: 116 lb 3.2 oz (52.7 kg)   Height: 5' 3\" (1.6 m)        Constitutional and General Appearance: Warm and dry, no apparent distress, normal coloration  HEENT:  Normocephalic, atraumatic  Respiratory:  Normal excursion and expansion without use of accessory muscles  Resp Auscultation: Normal breath sounds without dullness  Cardiovascular: The apical impulses not displaced  Heart tones are crisp and normal  Normal JVP  Regular rhythm   Peripheral pulses are symmetrical and full  There is no clubbing, cyanosis of the extremities.   R>L ankle edema  Pedal Pulses: 2+ and equal   Abdomen:  No masses or tenderness  Liver/Spleen: No Abnormalities Noted  Neurological/Psychiatric:  Alert and oriented in all spheres  Moves all extremities well  Exhibits normal gait balance and coordination  No abnormalities of mood, affect, memory, mentation, or behavior are noted    Lab Data:    CBC:   Lab Results   Component Value Date/Time    WBC 6.4 11/15/2022 05:19 AM    WBC 7.5 11/14/2022 05:39 AM    WBC 10.5 11/13/2022 04:36 AM    RBC 4.83 11/15/2022 05:19 AM    RBC 4.82 11/14/2022 05:39 AM    RBC 5.00 11/13/2022 04:36 AM HGB 11.9 11/15/2022 05:19 AM    HGB 12.0 11/14/2022 05:39 AM    HGB 12.7 11/13/2022 04:36 AM    HCT 39.7 11/15/2022 05:19 AM    HCT 38.7 11/14/2022 05:39 AM    HCT 40.1 11/13/2022 04:36 AM    MCV 82.2 11/15/2022 05:19 AM    MCV 80.3 11/14/2022 05:39 AM    MCV 80.0 11/13/2022 04:36 AM    RDW 19.9 11/15/2022 05:19 AM    RDW 19.6 11/14/2022 05:39 AM    RDW 19.8 11/13/2022 04:36 AM     11/15/2022 05:19 AM     11/14/2022 05:39 AM     11/13/2022 04:36 AM     BMP:  Lab Results   Component Value Date/Time     11/16/2022 04:34 AM     11/15/2022 05:19 AM     11/14/2022 05:39 AM    K 3.9 11/16/2022 04:34 AM    K 4.2 11/15/2022 05:19 AM    K 3.3 11/14/2022 05:39 AM    K 3.5 11/12/2022 04:33 AM    K 4.3 11/11/2022 06:55 PM    K 4.0 09/16/2022 05:35 AM     11/16/2022 04:34 AM     11/15/2022 05:19 AM     11/14/2022 05:39 AM    CO2 17 11/16/2022 04:34 AM    CO2 22 11/15/2022 05:19 AM    CO2 22 11/14/2022 05:39 AM    PHOS 2.8 11/15/2022 05:19 AM    PHOS 3.2 11/14/2022 05:39 AM    PHOS 2.8 11/13/2022 04:36 AM    BUN 29 11/16/2022 04:34 AM    BUN 40 11/15/2022 05:19 AM    BUN 52 11/14/2022 05:39 AM    CREATININE 1.1 11/16/2022 04:34 AM    CREATININE 1.4 11/15/2022 05:19 AM    CREATININE 1.8 11/14/2022 05:39 AM     BNP:   Lab Results   Component Value Date/Time    PROBNP 8,979 11/11/2022 06:55 PM    PROBNP 14,854 10/21/2022 04:41 AM    PROBNP 14,427 09/12/2022 05:17 AM     Cardiac Imaging:  [facility]: 10/19/22 Dr Sharona Flor  Findings  Artery Findings/Result   LM Ulcerated, 80% to mid, eccentric   LAD NA   Cx 80% prox   RI N/A   RCA N/A   LVEDP     LVG        Intervention(s)  Artery Location Intervention Result   LM Os-prox Xience 3.5X12 (PD with 4. 0NC to 18atm) No residual   Cx prox Xience 3.5X18  No residual      Post Cath Dx:       CAD as above      Cardiac CTA: 9/15/22  FINDINGS:   Left atrium:       Watch man device is seen.   There is only a small amount of thrombus in the   watch man device. .       The left atrial appendage is not thrombosed. Contrast is seen in the left   atrial appendage, compatible with leakage. .       On axial images 18. There is a crescentic area of contrast flowing around   the watch man device superiorly in the left atrial appendage. Franklyn Mannkendell However, no   definite fistulous tract is seen connecting this to the left atrium. Circumflex coronary artery abuts the inferior aspect of the watch man device. There is severe calcified and noncalcified plaque seen in the circumflex. A   definite fistulous tract between the circumflex and the left atrial appendage   is not clearly identified by CT. Lavkaur Candkendell Native right coronary artery and left anterior descending coronary artery   heavily calcified. Mediastinum: Heart is enlarged. Thyroid gland unremarkable. Streak artifact   is seen from pacer. Ascending aorta measures 3.5 cm. No intimal flap is seen. No pericardial   effusion. Small hiatal hernia seen. No central pulmonary embolus   identified. Small mediastinal and hilar nodes are noted. Right hilar [place]   conglomerate, measures 1.9 cm x 2.4 cm. .  Calcification or clip seen in the   region of mitral valve. Lungs/Pleura: Respiratory motion artifact limits evaluation of fine pulmonary   parenchymal change. Mild underlying emphysema is seen. De pendant opacity   is seen at the lung bases, likely atelectasis. Pleural calcifications seen   on the left. Right hilar nodes are seen, similar compared to conventional chest CT August 2020       Upper Abdomen: Low attenuation is seen liver, compatible with fatty   infiltration. Soft Tissues/Bones: No acute bone or soft tissue abnormality.        Cath: 9/12/22 Dr Tj French  Findings  Artery Findings/Result   LM 30% ostial to proximal   LAD Patent prox to mid stent, 60% diffuse ostial to mid   Cx 80% prox, fistula noted from Cx proper v Cx branch to left atrium/left atrial appendage (not present on prior angiogram done before placement of Watchman device, suspect erosion), OM1 70% bifurcational, inferior branch of OM1 50% mid,    RI N/A   RCA Mid 100% with R-R collaterals. L-LAD patent   LVEDP NA   LVG NA      RHC:  RA RV PA FANNIE WP TCO TCI JODI FDC RA% PA%   2 30/3 35/15 23 JULIOCESAR 4.5 3.0 4.7 3.1 70 67%      Intervention(s)  None     Post Cath Dx:       CAD as above  Consider PCI of Cx in future if Cx proper geographically  from [facility] device on CTC    DONA Dr Yadav Chol 7/20/2022  Summary   Normal left ventricle size, wall thickness, and systolic function with an   estimated ejection fraction of 55-60%. Moderate mitral regurgitation is present. Left atrial size is dilated. There is a Watchman device seated with a feliciano-device leak measured at 3 mm   on the anterior side. No thrombus seen. Moderate tricuspid regurgitation. Systolic pulmonary artery pressure (SPAP) estimated at 41 mmHg (RA pressure   3 mmHg), consistent with mild pulmonary hypertension. Echo 1/12/2022  Summary   Normal left ventricle size, wall thickness, and systolic function with an   estimated ejection fraction of 55-60%. Mitral valve leaflets appear moderately thickened. The posterior leaflet   appears severely restricted. Moderate mitral regurgitation is present. Mitral annular calcification. Left atrial size is dilated. There is a Watchman device seated with a   feliciano-device leak measured at 2 mm on the anterior side. There is a small an echogenic mass on the surface of the device, consistent   with thrombus near the coumadin ridge. DONA 7/7/2021  Summary   Ejection fraction is visually estimated to be 45-50 %. Mild thickening of anterior, posterior leaflet of mitral valve. There is decreased leaflet motion and \"hockey stick\" appearance of the   mitral leaflets. posterior leaflet is immobile. Mean Gradient 4 mmHg. Moderate mitral regurgitation is present.    There is no evidence of mass or thrombus in the left atrium or appendage. Watchman in place. No thrombus. <3 mm leak. The left atrium is dilated. A PFO is present. Aortic valve leaflets appear thickened. Tricuspid aortic valve. Mild aortic regurgitation is present. Plaque is noted in the thoracic aorta. Moderate tricuspid regurgitation. PASP 51 mmHg    [facility] 3/19/2021 Dr Salty Mendez  Artery Findings/Result   LM Normal   LAD 50% ostial, 50% mid instent   Cx 50% mid at OM1 bifurcation, 30% OM1 mid at bifurcation   RI NA   % prox with multiple R-R collaterals   LVEDP 8   LVG NA due to renal failure      Intervention:         None     Post Cath Dx:       Stable CAD  Possible angina from  of RCA - poor candidate for  intervention with renal failure    Echo 4/30/2020   Left ventricular cavity size is normal. Normal left ventricular wall   thickness. Left ventricular function appears to be reduced with an estimated   ejection fraction of 45%. Diffuse hypokinesis. Echo 7/19/2019   Summary    Left ventricle - normal size, thickness, reduced function with EF of 45%  LV wall motion - diffuse hypokinesis. Mitral valve - thickened, annular calcification, moderate regurgitation  Aortic valve - thickened, calcified, mild regurgitation  Tricuspid valve - mild regurgitation with PASP of 25mmHg  Pulmonic valve - trivial regurgitation   Biatrial enlargement  Pacemaker / ICD lead is visualized in the right heart.     6/2019 Dr Salty Mendez  NSTEMI: . Angiogram findings were as below:      Findings:                      LM       Normal              LAD     50% ostial, mid 100%              Cx        40% OM1 at bifurcation              RCA     Mid 100%              LVG     Not performed              EDP     15 mmHg              L-LAD  Patent              S-PDA Known occluded  Severe Ca++:None  Post Cath Dx:Stable CAD, no apparent culprit for NSTEMI  Intervention:  None  Med Rec:        Continue aggressive med tx                          Continue plavix, no asa due to allergy. statin added. LDL 75   8/7/18  The PCI of complex proximal RCA chronic total occlusion was unsuccessful (J- score 3). Underwent successful Angioplasty of high-grade proximal RCA lesion proximal to the . RECOMMENDATIONS:  Attempt on this complex long  was unsuccessful. Lack   Of retrograde collaterals along with a very ambiguous cap as well as a large RV marginal branch and bridging collateral coming off at the cap makes it a  challenging repeat attempt. If she continues to have angina, it is recommended to proceed with the single-vessel SVG to the RCA. Echo: 2/17/16 ([facility])  Conclusions: Normal LV size and systolic function Mild to moderate mitral  regurgitation Mild tricuspid regurgitation  Findings:   Left Atrium: Mild to moderate enlargement of the left atrium. Left Ventricle: Upper limits of normal left ventricle size. No left ventricular  hypertrophy. Normal left ventricular systolic function. The ejection fraction   Is visually estimated to be 55 %. Right Atrium: Normal right atrial size. RightVentricle: Normal right ventricle size. Normal right ventricular function. Aortic Valve: Tri-leaflet aortic valve. Mild aortic cusp calcification. No  aortic valve stenosis. Mild (1+) aortic valve regurgitation. Aorta: No dilatation of the aortic root. IVC/PA: Normal IVC size and normal respiratory  collapse consistent with normal right atrial pressure. Mitral Valve: Mild mitral valve leaflet calcification. Mild to moderate (2+) mitral valve  regurgitation. No mitral valve stenosis. Tricuspid Valve: Mild (1+) tricuspid  regurgitation. The pulmonary artery pressure is normal.   Pulmonic Valve: Mild  pulmonic regurgitation. Pericardium: Normal pericardium with no significant  pericardial effusion. Assessment:    1. Chronic systolic heart failure (HCC) with improved LVEF on bb; no aldosterone antagonist due to renal insuff   2.  Chronic atrial fibrillation s/p watchman Dr Ness Joseph   3. Coronary artery disease involving autologous artery coronary bypass graft without angina pectoris    4.       Renal insufficiency follows with Dr Ashwin Alford:   Resume coreg 25 mg twice a day  Review my medication with yours at home   Check blood work   RTO in 2 months at [place]  Take torsemide 10 mg every day except sunday    I appreciate the opportunity of cooperating in the care of this individual.    TIFFANIE Ospina - CNS, CNS, 11/29/2022, 3:10 PM

## 2022-11-29 NOTE — PROGRESS NOTES
Subjective:      Patient ID: Elham Corado is a 80 y.o. female. CC: Patient presents for hospital follow-up. HPI pt is here with her daughter for a hospital follow up. Patient was hospitalized at [facility] from November 11 through November 16. It was felt that she had congestive heart failure and was diuresed 5 pounds during hospitalization. She did have acute renal failure but this resolved rather quickly with a creatinine back to 1.1 at discharge. She has been weighing herself at home but unfortunately her scale broke yesterday and she needs to buy a new scale. She feels her breathing stable at this time. Her biggest complaint is arthritis of both her shoulders and hips and she would like to restart Celebrex medication. She also has increasing nerve pain of both her lower extremities consistent with peripheral neuropathy. She has been on gabapentin in the past but had some adverse reactions and she was on Cymbalta but stopped this as she was not sure it was effective.     Review of Systems  Patient Active Problem List   Diagnosis    Essential hypertension, benign    Mixed hyperlipidemia    Chronic gouty arthropathy    Acquired hypothyroidism    Non-rheumatic mitral regurgitation    COPD (chronic obstructive pulmonary disease) ([facility])    Restrictive lung disease    Sick sinus syndrome ([facility])    Allergic rhinitis    Fibrocystic breast    Cerebrovascular accident (CVA) ([address]    HTN (hypertension), benign    Pacemaker    PAT (paroxysmal atrial tachycardia) ([facility])    Primary osteoarthritis involving multiple joints    Vitamin D deficiency    Tremor    Chronic total occlusion of native coronary artery    Age related osteoporosis    Chronic combined systolic (congestive) and diastolic (congestive) heart failure ([facility])    NSTEMI (non-ST elevated myocardial infarction) ([address]    Stage 3 chronic kidney disease (HCC)    PAD (peripheral artery disease) ([facility])    Atherosclerosis of native arteries of extremities with intermittent claudication, bilateral legs (HCC)    Idiopathic peripheral neuropathy    Ischemic cardiomyopathy    Peripheral vertigo, bilateral    PAF (paroxysmal atrial fibrillation) (HCC)    Dyslipidemia    Iron deficiency anemia    Anemia    Bilateral sensorineural hearing loss    Memory loss due to medical condition    Symptomatic bradycardia    Pacemaker lead failure    Presence of Watchman left atrial appendage closure device    Chronic right sacroiliac pain    Postcholecystectomy diarrhea    Chronic renal failure, stage 3b ([facility]    Coronary artery disease involving native heart without angina pectoris, unspecified vessel or lesion type    Hoarseness, persistent    Acute respiratory failure with hypoxia ([facility]       Outpatient Medications Marked as Taking for the 11/29/22 encounter (Office Visit) with Simon Borges MD   Medication Sig Dispense Refill    clopidogrel (PLAVIX) 75 MG tablet Take 1 tablet by mouth daily 90 tablet 2    HYDROcodone-acetaminophen (NORCO) 5-325 MG per tablet Take 1 tablet by mouth 4 times daily as needed for Pain for up to 30 days.  120 tablet 0    carvedilol (COREG) 25 MG tablet Take 1 tablet by mouth 2 times daily (with meals) (Patient taking differently: Take 25 mg by mouth daily) 180 tablet 3    rosuvastatin (CRESTOR) 5 MG tablet Take 1 tablet by mouth nightly 90 tablet 1    levothyroxine (SYNTHROID) 125 MCG tablet Take 1 tablet by mouth Daily 90 tablet 0    aspirin EC 81 MG EC tablet Take 1 tablet by mouth daily 90 tablet 1    topiramate (TOPAMAX) 50 MG tablet TAKE 2 TABLETS TWICE DAILY 360 tablet 0    torsemide (DEMADEX) 20 MG tablet 10 mg on MWF 30 tablet 3    albuterol sulfate HFA (VENTOLIN HFA) 108 (90 Base) MCG/ACT inhaler Inhale 2 puffs into the lungs 4 times daily as needed for Wheezing 18 g 0    vitamin D (ERGOCALCIFEROL) 1.25 MG (32296 UT) CAPS capsule TAKE 1 CAPSULE ONCE A WEEK 12 capsule 1    allopurinol (ZYLOPRIM) 100 MG tablet TAKE 1 TABLET EVERY DAY 90 tablet 1    nitroGLYCERIN (NITROLINGUAL) 0.4 MG/SPRAY 0.4 mg spray Place 1 spray under the tongue every 5 minutes as needed for Chest pain 4.9 g 1    fluticasone (FLONASE) 50 MCG/ACT nasal spray 2 sprays by Each Nostril route daily 16 g 0       Allergies   Allergen Reactions    Aspirin Nausea Only    Diltiazem Anaphylaxis    Diltiazem Hcl Anaphylaxis    Lorazepam Other (See Comments)     hallucinations  hallucinations  hallucinations    Sulfa Antibiotics Rash and Hives    Atorvastatin Other (See Comments)     Muscle pains  Muscle pains  Muscle pains    Dabigatran Nausea Only     And indigestion  And indigestion    Aka Pradaxa  And indigestion  And indigestion  And indigestion    Aka Pradaxa  And indigestion  And indigestion  And indigestion    Aka Pradaxa    Mysoline [Primidone]     Nsaids      Other reaction(s): Unknown    Other Other (See Comments)     Nitroglycerin patches causes severe headaches    Primidone      Other reaction(s): Unknown       Social History     Tobacco Use    Smoking status: Former     Packs/day: 1.00     Years: 28.00     Pack years: 28.00     Types: Cigarettes     Quit date: 1987     Years since quittin.9    Smokeless tobacco: Never    Tobacco comments:     H.O.smoking at age 15 / smoked up to 1 p.p.d / quit    Substance Use Topics    Alcohol use: Yes     Comment: social       /60 (Site: Left Upper Arm, Position: Sitting, Cuff Size: Medium Adult)   Pulse 70   Temp 97.1 °F (36.2 °C) (Infrared)   Resp 12   Wt 115 lb 6 oz (52.3 kg)   SpO2 98%   BMI 20.44 kg/m²      Objective:   Physical Exam  Constitutional:       General: She is not in acute distress. Appearance: She is well-developed. Neck:      Vascular: No carotid bruit. Cardiovascular:      Rate and Rhythm: Normal rate and regular rhythm. Pulses:           Dorsalis pedis pulses are 2+ on the right side and 2+ on the left side. Posterior tibial pulses are 2+ on the right side and 2+ on the left side. Heart sounds: Murmur (Left sternal border) heard. Systolic murmur is present with a grade of 2/6. No friction rub. No gallop. Pulmonary:      Effort: Pulmonary effort is normal.      Breath sounds: Normal breath sounds. Musculoskeletal:      Right shoulder: Tenderness and crepitus present. Decreased range of motion. Left shoulder: Tenderness and crepitus present. Decreased range of motion. Right hip: Tenderness present. Normal range of motion. Left hip: Tenderness (Greater enteric area bilaterally) present. Normal range of motion. Right lower leg: No edema. Left lower leg: No edema. Neurological:      Mental Status: She is alert and oriented to person, place, and time. Sensory: Sensation is intact. Motor: Motor function is intact. Comments: Hypersensitivity to pain from knee down bilaterally     Psychiatric:         Behavior: Behavior is cooperative. Assessment:      Charlene Jones was seen today for follow-up from hospital.    Diagnoses and all orders for this visit:    NSTEMI (non-ST elevated myocardial infarction) ([facility]    Chronic combined systolic (congestive) and diastolic (congestive) heart failure (HCC)    PAF (paroxysmal atrial fibrillation) (HCC)    Chronic renal failure, stage 3b (HCC)    Primary osteoarthritis involving multiple joints    Idiopathic peripheral neuropathy    OARRS report checked        Plan:      Hospital information reviewed with patient and daughter    Encourage patient to do daily weights and if her weight was start going up more than 3 pounds we discussed increasing diuretic 20 mg daily. With the paroxysmal atrial fibrillation recommended the next time she have any's episodes that she could take a second dose of Coreg that day. Unfortunately with chronic renal failure and congestive heart failure she is not a candidate for Celebrex medication for osteoarthritis.   Recommended patient use sports creams and discussed using heat to the affected joint. For the peripheral neuropathy recommended patient restart Celebrex medication at 30 mg daily. Keep RTC appointment in January    Total time of consultation 41 minutes. Please note that this chart was generated using Dragon dictation software. Although every effort was made to ensure the accuracy of this automated transcription, some errors in transcription may have occurred.

## 2022-11-29 NOTE — PATIENT INSTRUCTIONS
Resume coreg 25 mg twice a day  Review my medication with yours at home   Check blood work   RTO in 2 months at [place]  Take torsemide 10 mg every day except sunday

## 2022-11-30 ENCOUNTER — TELEPHONE (OUTPATIENT)
Dept: FAMILY MEDICINE CLINIC | Age: 82
End: 2022-11-30

## 2022-11-30 DIAGNOSIS — A04.72 C. DIFFICILE COLITIS: Primary | ICD-10-CM

## 2022-11-30 RX ORDER — VANCOMYCIN HYDROCHLORIDE 250 MG/1
CAPSULE ORAL
Qty: 42 CAPSULE | Refills: 0 | Status: SHIPPED | OUTPATIENT
Start: 2022-11-30

## 2022-11-30 NOTE — TELEPHONE ENCOUNTER
PT called back and reported having extreme diarrhea and would like something called in.       [facility] [id number] [facility], [address] [place] [address] [phone] [facility] [phone]   [place] [address]   Phone:  [phone]  Fax:  [phone]

## 2022-12-01 ENCOUNTER — TELEPHONE (OUTPATIENT)
Dept: ADMINISTRATIVE | Age: 82
End: 2022-12-01

## 2022-12-01 NOTE — TELEPHONE ENCOUNTER
Submitted PA for Vancomycin HCl 250MG capsules. Key: [id number]. Via CMM STATUS: APPROVED from 1/1/2022 12:00:00 AM, Coverage Ends on: 12/31/2023. Letter attached. If this requires a response please respond to the pool ( [id number] [facility]). Thank you please advise patient.

## 2022-12-08 ENCOUNTER — HOSPITAL ENCOUNTER (OUTPATIENT)
Dept: ULTRASOUND IMAGING | Age: 82
Discharge: HOME | End: 2022-12-08
Payer: MEDICARE

## 2022-12-08 DIAGNOSIS — R59.1 LYMPHADENOPATHY: ICD-10-CM

## 2022-12-08 PROCEDURE — 76536 US EXAM OF HEAD AND NECK: CPT

## 2022-12-10 ENCOUNTER — APPOINTMENT (OUTPATIENT)
Dept: CT IMAGING | Age: 82
End: 2022-12-10
Payer: MEDICARE

## 2022-12-10 ENCOUNTER — HOSPITAL ENCOUNTER (EMERGENCY)
Age: 82
Discharge: HOME | End: 2022-12-10
Attending: EMERGENCY MEDICINE
Payer: MEDICARE

## 2022-12-10 VITALS
HEART RATE: 92 BPM | SYSTOLIC BLOOD PRESSURE: 150 MMHG | TEMPERATURE: 97.4 F | RESPIRATION RATE: 18 BRPM | OXYGEN SATURATION: 92 % | DIASTOLIC BLOOD PRESSURE: 63 MMHG

## 2022-12-10 DIAGNOSIS — Z86.19 HISTORY OF CLOSTRIDIUM DIFFICILE COLITIS: ICD-10-CM

## 2022-12-10 DIAGNOSIS — R19.7 DIARRHEA OF PRESUMED INFECTIOUS ORIGIN: Primary | ICD-10-CM

## 2022-12-10 DIAGNOSIS — N28.9 RENAL LESION: ICD-10-CM

## 2022-12-10 LAB
A/G RATIO: 1.6 (ref 1.1–2.2)
ALBUMIN SERPL-MCNC: 3.8 G/DL (ref 3.4–5)
ALP BLD-CCNC: 73 U/L (ref 40–129)
ALT SERPL-CCNC: 6 U/L (ref 10–40)
ANION GAP SERPL CALCULATED.3IONS-SCNC: 9 MMOL/L (ref 3–16)
AST SERPL-CCNC: 10 U/L (ref 15–37)
BACTERIA: ABNORMAL /HPF
BASOPHILS ABSOLUTE: 0.2 K/UL (ref 0–0.2)
BASOPHILS RELATIVE PERCENT: 2.8 %
BILIRUB SERPL-MCNC: 0.4 MG/DL (ref 0–1)
BILIRUBIN URINE: NEGATIVE
BLOOD, URINE: NEGATIVE
BUN BLDV-MCNC: 33 MG/DL (ref 7–20)
CALCIUM SERPL-MCNC: 9.5 MG/DL (ref 8.3–10.6)
CHLORIDE BLD-SCNC: 114 MMOL/L (ref 99–110)
CLARITY: CLEAR
CO2: 22 MMOL/L (ref 21–32)
COLOR: YELLOW
CREAT SERPL-MCNC: 1.2 MG/DL (ref 0.6–1.2)
EOSINOPHILS ABSOLUTE: 0.3 K/UL (ref 0–0.6)
EOSINOPHILS RELATIVE PERCENT: 3.7 %
EPITHELIAL CELLS, UA: 2 /HPF (ref 0–5)
GFR SERPL CREATININE-BSD FRML MDRD: 45 ML/MIN/{1.73_M2}
GLUCOSE BLD-MCNC: 112 MG/DL (ref 70–99)
GLUCOSE URINE: NEGATIVE MG/DL
HCT VFR BLD CALC: 39.9 % (ref 36–48)
HEMOGLOBIN: 12.2 G/DL (ref 12–16)
HYALINE CASTS: 0 /LPF (ref 0–8)
KETONES, URINE: NEGATIVE MG/DL
LEUKOCYTE ESTERASE, URINE: ABNORMAL
LIPASE: 21 U/L (ref 13–60)
LYMPHOCYTES ABSOLUTE: 0.7 K/UL (ref 1–5.1)
LYMPHOCYTES RELATIVE PERCENT: 8.4 %
MCH RBC QN AUTO: 24.9 PG (ref 26–34)
MCHC RBC AUTO-ENTMCNC: 30.6 G/DL (ref 31–36)
MCV RBC AUTO: 81.4 FL (ref 80–100)
MICROSCOPIC EXAMINATION: YES
MONOCYTES ABSOLUTE: 0.6 K/UL (ref 0–1.3)
MONOCYTES RELATIVE PERCENT: 6.4 %
NEUTROPHILS ABSOLUTE: 6.9 K/UL (ref 1.7–7.7)
NEUTROPHILS RELATIVE PERCENT: 78.7 %
NITRITE, URINE: NEGATIVE
PDW BLD-RTO: 19.8 % (ref 12.4–15.4)
PH UA: 7 (ref 5–8)
PLATELET # BLD: 313 K/UL (ref 135–450)
PMV BLD AUTO: 9.5 FL (ref 5–10.5)
POTASSIUM REFLEX MAGNESIUM: 4.4 MMOL/L (ref 3.5–5.1)
PROTEIN UA: 30 MG/DL
RBC # BLD: 4.9 M/UL (ref 4–5.2)
RBC UA: 0 /HPF (ref 0–4)
SODIUM BLD-SCNC: 145 MMOL/L (ref 136–145)
SPECIFIC GRAVITY UA: 1.01 (ref 1–1.03)
TOTAL PROTEIN: 6.2 G/DL (ref 6.4–8.2)
URINE REFLEX TO CULTURE: YES
URINE TYPE: ABNORMAL
UROBILINOGEN, URINE: 0.2 E.U./DL
WBC # BLD: 8.8 K/UL (ref 4–11)
WBC UA: 41 /HPF (ref 0–5)

## 2022-12-10 PROCEDURE — 80053 COMPREHEN METABOLIC PANEL: CPT

## 2022-12-10 PROCEDURE — 87077 CULTURE AEROBIC IDENTIFY: CPT

## 2022-12-10 PROCEDURE — 36415 COLL VENOUS BLD VENIPUNCTURE: CPT

## 2022-12-10 PROCEDURE — 85025 COMPLETE CBC W/AUTO DIFF WBC: CPT

## 2022-12-10 PROCEDURE — 81001 URINALYSIS AUTO W/SCOPE: CPT

## 2022-12-10 PROCEDURE — 99284 EMERGENCY DEPT VISIT MOD MDM: CPT

## 2022-12-10 PROCEDURE — 87086 URINE CULTURE/COLONY COUNT: CPT

## 2022-12-10 PROCEDURE — 87184 SC STD DISK METHOD PER PLATE: CPT

## 2022-12-10 PROCEDURE — 87186 SC STD MICRODIL/AGAR DIL: CPT

## 2022-12-10 PROCEDURE — 83690 ASSAY OF LIPASE: CPT

## 2022-12-10 PROCEDURE — 74176 CT ABD & PELVIS W/O CONTRAST: CPT

## 2022-12-10 ASSESSMENT — LIFESTYLE VARIABLES
HOW OFTEN DO YOU HAVE A DRINK CONTAINING ALCOHOL: NEVER
HOW MANY STANDARD DRINKS CONTAINING ALCOHOL DO YOU HAVE ON A TYPICAL DAY: PATIENT DOES NOT DRINK

## 2022-12-10 NOTE — ED PROVIDER NOTES
EMERGENCY DEPARTMENT PROVIDER NOTE    Patient Identification  Pt Name: Glenroy Mott  MRN: [id number]  [place] 1940  Date of evaluation: 12/10/2022  Provider: Edmond Jacinto DO  PCP: Richa Christianson MD    Chief Complaint  Diarrhea (Pt states she contracted c diff after being admitted in November, saw PCP, has been on abx and is still having diarrhea)      HPI  (History provided by patient, daughter)  This is a 80 y.o. female with pertinent past medical history of CAD, HTN, CHF, C. Diff who was brought in by family for diarrhea ongoing for the past month. Nothing seems to make the symptoms any better or worse. Severity moderate. Diarrhea has been nonbloody, associated with mild cramping abdominal pain. Patient was diagnosed with C. Diff while in the hospital for cardiac evaluation, was treated with vancomycin while inpatient. Diarrhea initially improved however has now recurred, patient was started on another course of vancomycin by her PCP. States today she has had seven episodes of diarrhea since waking up from sleep. She denies any chest pain, shortness of breath, fevers or  symptoms.     ROS    Const:  No fevers, no chills, no generalized weakness  Skin:  No rash, no lesions  Eyes:  No visual changes, no blurry or double vision, no pain  ENT:  No sore throat, no difficulty swallowing, no ear pain, no sinus pain or congestion  Card:  No chest pain, no palpitations, no edema  Resp:  No shortness of breath, no cough, no wheezing  Abd:  +abdominal pain, no nausea, no vomiting, +diarrhea  Genitourinary:  No dysuria, no hematuria  MSK:  No joint pain, no myalgia  Neuro:  No focal weakness, no headache, no paresthesia    All other systems reviewed and negative unless otherwise noted in HPI      I have reviewed the following nursing documentation:  Allergies: Aspirin, Diltiazem, Diltiazem hcl, Lorazepam, Sulfa antibiotics, Atorvastatin, Dabigatran, Mysoline [primidone], Nsaids, Other, and Primidone    Past medical history:   Past Medical History:   Diagnosis Date    Allergic rhinitis, cause unspecified     Arthritis     Atrial fibrillation ([address]     Bronchopneumonia     CAD (coronary artery disease)     stent:  post cataract surgery (CABG)    Cerebral artery occlusion with cerebral infarction [facility])     TIA    Chronic gouty arthropathy     Chronic kidney disease     Congestive heart failure, unspecified     Degeneration of cervical intervertebral disc     Essential and other specified forms of tremor     Gout     HIGH CHOLESTEROL     History of CVA (cerebrovascular accident)     Hx of blood clots     Hypertension     Influenza 12/23/2017    Leakage of Watchman left atrial appendage closure device     Mitral valve stenosis and aortic valve insufficiency     Movement disorder     back problems    Pacemaker     Peptic ulcer, unspecified site, unspecified as acute or chronic, without mention of hemorrhage, perforation, or obstruction     Thyroid disease     Unspecified disorder of kidney and ureter     Unspecified hypothyroidism      Past surgical history:   Past Surgical History:   Procedure Laterality Date    BACK SURGERY      CARDIAC CATHETERIZATION  7/2012    CARDIAC CATHETERIZATION  08/07/2018    Unsuccesful  of RCA    CARDIAC SURGERY      CABG & Cardiac ablation    CHOLECYSTECTOMY      CORONARY ARTERY BYPASS GRAFT  1987    LIMA- Diag/LAD, SVG- RCA    FEMORAL BYPASS Left 8/22/2019    LEFT FEMORAL TO POPLITEAL BYPASS GRAFT performed by Sydni Allen MD at [address]    FEMORAL-FEMORAL BYPASS GRAFT N/A 8/22/2019    FEMORAL TO FEMORAL BYPASS performed by Sydni Allen MD at [address] Left 03/16/2017    Left hip pinning    IR KYPHOPLASTY LUMBAR FIRST LEVEL  5/14/2021    IR KYPHOPLASTY LUMBAR FIRST LEVEL 5/14/2021 [facility] SPECIAL PROCEDURES    JOINT REPLACEMENT      [place] NJX AA&/STRD TFRML EPI LUMBAR/SACRAL 1 LEVEL Right 12/3/2018    RIGHT L3 AND L4 LUMBAR TRANSFORAMINAL EPIRUAL STEROID INJECTION WITH FLUOROSCOPY performed by Thalia Daniels MD at [address] [facility]\""    PTCA  11/04/2014    MARLENY - 3.0 x 28 to the [address]      left       Home medications:   Discharge Medication List as of 12/10/2022  3:53 PM        CONTINUE these medications which have NOT CHANGED    Details   vancomycin (VANCOCIN) 250 MG capsule 1 capsule 3 times a day for 7 days, then 1 capsule twice daily for 7 days then ,1 capsule daily for 7 days, Disp-42 capsule, R-0Normal      torsemide (DEMADEX) 20 MG tablet 10 mg every day except Sunday and as directed for weight gain, Disp-60 tablet, R-1Normal      clopidogrel (PLAVIX) 75 MG tablet Take 1 tablet by mouth daily, Disp-90 tablet, R-2Patient lost her prescription that she picked upNormal      HYDROcodone-acetaminophen (NORCO) 5-325 MG per tablet Take 1 tablet by mouth 4 times daily as needed for Pain for up to 30 days. , Disp-120 tablet, R-0Normal      carvedilol (COREG) 25 MG tablet Take 1 tablet by mouth 2 times daily (with meals), Disp-180 tablet, R-3Normal      rosuvastatin (CRESTOR) 5 MG tablet Take 1 tablet by mouth nightly, Disp-90 tablet, R-1Normal      levothyroxine (SYNTHROID) 125 MCG tablet Take 1 tablet by mouth Daily, Disp-90 tablet, R-0Normal      aspirin EC 81 MG EC tablet Take 1 tablet by mouth daily, Disp-90 tablet, R-1Normal      topiramate (TOPAMAX) 50 MG tablet TAKE 2 TABLETS TWICE DAILY, Disp-360 tablet, R-0Normal      vitamin D (ERGOCALCIFEROL) 1.25 MG (37560 UT) CAPS capsule TAKE 1 CAPSULE ONCE A WEEK, Disp-12 capsule, R-1Normal      allopurinol (ZYLOPRIM) 100 MG tablet TAKE 1 TABLET EVERY DAY, Disp-90 tablet, R-1Normal      nitroGLYCERIN (NITROLINGUAL) 0.4 MG/SPRAY 0.4 mg spray Place 1 spray under the tongue every 5 minutes as needed for Chest pain, Disp-4.9 g, R-1Normal      fluticasone (FLONASE) 50 MCG/ACT nasal spray 2 sprays by Each Nostril route daily, Disp-16 g, R-0Normal             Social history:  reports that she quit smoking about 35 years ago. Her smoking use included cigarettes. She has a 28.00 pack-year smoking history. She has never used smokeless tobacco. She reports current alcohol use. She reports that she does not use drugs. Family history:    Family History   Problem Relation Age of Onset    Cancer Sister     Heart Disease Sister     Diabetes Brother     Hypertension Brother     Heart Disease Brother     Stroke Maternal Aunt     Heart Disease Maternal Aunt     Diabetes Maternal Uncle     Hypertension Paternal Aunt     Cancer Maternal Grandmother     Cancer Paternal Grandmother     Rheum Arthritis Neg Hx     Lupus Neg Hx          Exam  ED Triage Vitals [12/10/22 1155]   BP Temp Temp Source Heart Rate Resp SpO2 Height Weight   (!) 150/63 97.4 °F (36.3 °C) Oral 92 18 92 % -- --     Nursing note and vitals reviewed. Constitutional: Well developed, well nourished. Non-toxic in appearance. HENT:      Head: Normocephalic and atraumatic. Ears: External ears normal.      Nose: Nose normal.     Mouth: Membrane mucosa moist and pink. Eyes: Anicteric sclera. No discharge. Neck: Supple. Trachea midline. Cardiovascular: RRR; no murmurs, rubs, or gallops. Pulmonary/Chest: Effort normal. No respiratory distress. CTAB. No stridor. No wheezes. No rales. Abdominal: Soft. No distension. Mild tenderness to palpation diffusely. No focal RLQ or RUQ tenderness. Negative Rovsing. No rebound, no rigidity, no guarding. Musculoskeletal: Moves all extremities. No gross deformity. Neurological: Alert and orientedx4. Face symmetric. Speech is clear. Skin: Warm and dry. No rash. Psychiatric: Normal mood and affect. Behavior is normal.        Radiology  CT ABDOMEN PELVIS WO CONTRAST Additional Contrast? None   Final Result   Fluid is seen within the lumen of the colon, in keeping with the history of   diarrheal illness. Small bilateral pleural effusions. Atherosclerosis, including coronary artery calcifications. Splenomegaly. Indeterminate lesions are seen within each kidney, incompletely characterized   without contrast.  Renal protocol CT or MR in the nonacute setting is   suggested for more complete characterization. Diverticulosis.              Labs  Results for orders placed or performed during the hospital encounter of 12/10/22   CBC with Auto Differential   Result Value Ref Range    WBC 8.8 4.0 - 11.0 K/uL    RBC 4.90 4.00 - 5.20 M/uL    Hemoglobin 12.2 12.0 - 16.0 g/dL    Hematocrit 39.9 36.0 - 48.0 %    MCV 81.4 80.0 - 100.0 fL    MCH 24.9 (L) 26.0 - 34.0 pg    MCHC 30.6 (L) 31.0 - 36.0 g/dL    RDW 19.8 (H) 12.4 - 15.4 %    Platelets 863 541 - 144 K/uL    MPV 9.5 5.0 - 10.5 fL    Neutrophils % 78.7 %    Lymphocytes % 8.4 %    Monocytes % 6.4 %    Eosinophils % 3.7 %    Basophils % 2.8 %    Neutrophils Absolute 6.9 1.7 - 7.7 K/uL    Lymphocytes Absolute 0.7 (L) 1.0 - 5.1 K/uL    Monocytes Absolute 0.6 0.0 - 1.3 K/uL    Eosinophils Absolute 0.3 0.0 - 0.6 K/uL    Basophils Absolute 0.2 0.0 - 0.2 K/uL   CMP w/ Reflex to MG   Result Value Ref Range    Sodium 145 136 - 145 mmol/L    Potassium reflex Magnesium 4.4 3.5 - 5.1 mmol/L    Chloride 114 (H) 99 - 110 mmol/L    CO2 22 21 - 32 mmol/L    Anion Gap 9 3 - 16    Glucose 112 (H) 70 - 99 mg/dL    BUN 33 (H) 7 - 20 mg/dL    Creatinine 1.2 0.6 - 1.2 mg/dL    Est, Glom Filt Rate 45 (A) >60    Calcium 9.5 8.3 - 10.6 mg/dL    Total Protein 6.2 (L) 6.4 - 8.2 g/dL    Albumin 3.8 3.4 - 5.0 g/dL    Albumin/Globulin Ratio 1.6 1.1 - 2.2    Total Bilirubin 0.4 0.0 - 1.0 mg/dL    Alkaline Phosphatase 73 40 - 129 U/L    ALT 6 (L) 10 - 40 U/L    AST 10 (L) 15 - 37 U/L   Urinalysis with Reflex to Culture    Specimen: Urine   Result Value Ref Range    Color, UA Yellow Straw/Yellow    Clarity, UA Clear Clear    Glucose, Ur Negative Negative mg/dL    Bilirubin Urine Negative Negative    Ketones, Urine Negative Negative mg/dL    Specific Gravity, UA 1.015 1.005 - 1.030    Blood, Urine Negative Negative    pH, UA 7.0 5.0 - 8.0    Protein, UA 30 (A) Negative mg/dL    Urobilinogen, Urine 0.2 <2.0 E.U./dL    Nitrite, Urine Negative Negative    Leukocyte Esterase, Urine MODERATE (A) Negative    Microscopic Examination YES     Urine Type Cleancatch     Urine Reflex to Culture Yes    Lipase   Result Value Ref Range    Lipase 21.0 13.0 - 60.0 U/L   Microscopic Urinalysis   Result Value Ref Range    Bacteria, UA 2+ (A) None Seen /HPF    Hyaline Casts, UA 0 0 - 8 /LPF    WBC, UA 41 (H) 0 - 5 /HPF    RBC, UA 0 0 - 4 /HPF    Epithelial Cells, UA 2 0 - 5 /HPF       Screenings   Ronit Coma Scale  Eye Opening: Spontaneous  Best Verbal Response: Oriented  Best Motor Response: Obeys commands  Ronit Coma Scale Score: 15       Is this patient to be included in the SEP-1 Core Measure due to severe sepsis or septic shock? No   Exclusion criteria - the patient is NOT to be included for SEP-1 Core Measure due to:  2+ SIRS criteria are not met      MDM and ED Course    Patient afebrile and nontoxic. No distress. She is overall well appearing. No peritoneal signs on abdominal exam, no focal findings to suggest acute appendicitis or cholecystitis. Lab workup is reassuring, renal function at baseline, no leukocytosis, no acute end organ dysfunction or clinically significant electrolyte derangement. CT imaging with fluid filled colon consistent with known diarrheal illness, however no obstruction, perforation or intra-abdominal abscess. Patient unable to provide stool sample while in the emergency department. UA equivocal for infection, patient denies any urinary symptoms, given risk of worsening diarrheal illness and C. Diff with UTI antibiotics treatment will defer this for now until culture results. Patient remained well appearing over her emergency departments course, given reassuring lab workup and imaging felt safe for continued outpatient management. Will prescribe Dificid.  Counseled importance of close PCP follow up and will also refer to GI. Patient and her daughter are agreeable with plan and feel comfortable returning to home. Strict return precautions discussed. I Dr. Severo Silvers am the primary clinician of record. Final Impression  1. Diarrhea of presumed infectious origin    2. History of Clostridium difficile colitis    3. Renal lesion        Blood pressure (!) 150/63, pulse 92, temperature 97.4 °F (36.3 °C), temperature source Oral, resp. rate 18, SpO2 92 %, not currently breastfeeding. Disposition:  DISPOSITION Decision To Discharge 12/10/2022 03:45:11 PM      Patient Referrals:  Richa Christianson MD  [address] [facility] [address] [facility]  [phone]    In 2 days      [facility], [address] [facility], [address]  [address]  [phone]    In 1 week  GI - for your diarrhea      Discharge Medications:  Discharge Medication List as of 12/10/2022  3:53 PM        START taking these medications    Details   Fidaxomicin (DIFICID) 200 MG TABS tablet Take 200 mg by mouth 2 times daily for 10 days, Disp-20 tablet, R-0Print             Discontinued Medications:  Discharge Medication List as of 12/10/2022  3:53 PM          This chart was generated using the [address] [place], [place] [address]  dictation system. I created this record but it may contain dictation errors given the limitations of this technology.     Edmond Jacinto DO (electronically signed)  Attending Emergency Physician       Edmond Jacinto DO  12/10/22 1947

## 2022-12-10 NOTE — DISCHARGE INSTRUCTIONS
Please follow-up with your primary care physician within the next 2 days for reevaluation of your abdominal pain and diarrhea. Begin taking fidaxomicin as prescribed. Drink plenty of fluids. Continue your other regular medications as prescribed. I recommend that you follow-up with gastroenterology at the above consult information for your recurrent diarrhea. Please call to schedule an appointment. You have lesions on your kidneys noted on CT imaging today, these are incidental and not related to your current symptoms. It is importantly discussed this finding with your primary care physician as you may need further imaging tests or evaluation in the future. Return to the emergency department you have worsening or changing symptoms, fevers, chest pain, trouble breathing, changing or worsening abdominal pain or bloody stools. Return if you have other new or changing symptoms that concern you and you wish to be reevaluated.

## 2022-12-11 LAB
ORGANISM: ABNORMAL
URINE CULTURE, ROUTINE: ABNORMAL

## 2022-12-12 ENCOUNTER — TELEPHONE (OUTPATIENT)
Dept: OTHER | Facility: CLINIC | Age: 82
End: 2022-12-12

## 2022-12-13 LAB
ORGANISM: ABNORMAL
URINE CULTURE, ROUTINE: ABNORMAL

## 2022-12-14 ENCOUNTER — TELEPHONE (OUTPATIENT)
Dept: CARDIOLOGY CLINIC | Age: 82
End: 2022-12-14

## 2022-12-14 DIAGNOSIS — M15.9 PRIMARY OSTEOARTHRITIS INVOLVING MULTIPLE JOINTS: ICD-10-CM

## 2022-12-14 RX ORDER — HYDROCODONE BITARTRATE AND ACETAMINOPHEN 5; 325 MG/1; MG/1
1 TABLET ORAL 4 TIMES DAILY PRN
Qty: 120 TABLET | Refills: 0 | Status: SHIPPED | OUTPATIENT
Start: 2022-12-14 | End: 2023-01-13

## 2022-12-14 NOTE — TELEPHONE ENCOUNTER
Called pt and she states she had the first monitor for a couple days and it didn't work well,so they sent her a second one and she wore it for the 30 days.

## 2022-12-14 NOTE — TELEPHONE ENCOUNTER
----- Message from TIFFANIE Mariee CNP sent at 12/12/2022 12:58 PM EST -----  Monitor only worn for a little over a day. Ordered for 30 days. Monitor was unremarkable. Can offer for her to repeat monitor if she would like.

## 2022-12-14 NOTE — TELEPHONE ENCOUNTER
HYDROcodone-acetaminophen ([facility]) 5-325 MG per tablet   [[id number]]  Order Details  Ordered Dose: -- Route: -- Frequency: --   Admin Dose: --      Scheduled Start Date/Time: 11/12/22 0100      [facility] [id number] [address] [phone] [facility] [phone]   [place] [address]   Phone:  [phone]  Fax:  [phone]

## 2022-12-14 NOTE — PROGRESS NOTES
[facility]   Emergency Department Culture Follow-Up       Mohsen Hendrix (CSN: [id number]) was seen and evaluated at [facility] Emergency Department on 12/10 by provider Dr. Marely Valerio. A urine culture was positive and is growing >100k Klebsiella pneumoniae. Sensitivity results: sensitive to cefazolin      Treatment Course: The patient was NOT treated in the emergency department with antimicrobial therapy. Recommendation:    Given that the patient did not display any urinary symptoms to suggest infection, it is not recommended to treat as she is displaying asymptomatic bacteriuria. Furthermore, additional antibiotics would possibly worsen her c diff. This recommendation was reviewed with and agreed by ED provider Dr. Marely Valerio. Follow-Up:    No follow up required.     Thank you,    Wolf Ho, [facility]  12/14/2022

## 2022-12-15 ENCOUNTER — TELEPHONE (OUTPATIENT)
Dept: FAMILY MEDICINE CLINIC | Age: 82
End: 2022-12-15

## 2022-12-15 DIAGNOSIS — N17.9 ACUTE RENAL FAILURE, UNSPECIFIED ACUTE RENAL FAILURE TYPE (HCC): ICD-10-CM

## 2022-12-15 DIAGNOSIS — I50.22 CHRONIC SYSTOLIC HEART FAILURE (HCC): ICD-10-CM

## 2022-12-15 DIAGNOSIS — N18.9 ACUTE KIDNEY INJURY SUPERIMPOSED ON CHRONIC KIDNEY DISEASE (HCC): ICD-10-CM

## 2022-12-15 DIAGNOSIS — I50.9 CONGESTIVE HEART FAILURE, UNSPECIFIED HF CHRONICITY, UNSPECIFIED HEART FAILURE TYPE (HCC): ICD-10-CM

## 2022-12-15 DIAGNOSIS — R09.89 PULMONARY CONGESTION: ICD-10-CM

## 2022-12-15 DIAGNOSIS — N18.32 STAGE 3B CHRONIC KIDNEY DISEASE (HCC): ICD-10-CM

## 2022-12-15 DIAGNOSIS — N17.9 ACUTE KIDNEY INJURY SUPERIMPOSED ON CHRONIC KIDNEY DISEASE (HCC): ICD-10-CM

## 2022-12-15 DIAGNOSIS — I50.22 CHRONIC SYSTOLIC CONGESTIVE HEART FAILURE (HCC): ICD-10-CM

## 2022-12-15 LAB
ALBUMIN SERPL-MCNC: 4 G/DL (ref 3.4–5)
ANION GAP SERPL CALCULATED.3IONS-SCNC: 11 MMOL/L (ref 3–16)
BUN BLDV-MCNC: 37 MG/DL (ref 7–20)
CALCIUM SERPL-MCNC: 9 MG/DL (ref 8.3–10.6)
CHLORIDE BLD-SCNC: 110 MMOL/L (ref 99–110)
CO2: 24 MMOL/L (ref 21–32)
CREAT SERPL-MCNC: 1.4 MG/DL (ref 0.6–1.2)
GFR SERPL CREATININE-BSD FRML MDRD: 38 ML/MIN/{1.73_M2}
GLUCOSE BLD-MCNC: 116 MG/DL (ref 70–99)
HCT VFR BLD CALC: 40.2 % (ref 36–48)
HEMOGLOBIN: 12 G/DL (ref 12–16)
MCH RBC QN AUTO: 24.6 PG (ref 26–34)
MCHC RBC AUTO-ENTMCNC: 30 G/DL (ref 31–36)
MCV RBC AUTO: 82 FL (ref 80–100)
PARATHYROID HORMONE INTACT: 70.3 PG/ML (ref 14–72)
PDW BLD-RTO: 19.9 % (ref 12.4–15.4)
PHOSPHORUS: 3.6 MG/DL (ref 2.5–4.9)
PLATELET # BLD: 294 K/UL (ref 135–450)
PMV BLD AUTO: 10.5 FL (ref 5–10.5)
POTASSIUM SERPL-SCNC: 4.1 MMOL/L (ref 3.5–5.1)
PRO-BNP: 6148 PG/ML (ref 0–449)
RBC # BLD: 4.9 M/UL (ref 4–5.2)
SODIUM BLD-SCNC: 145 MMOL/L (ref 136–145)
VITAMIN D 25-HYDROXY: 55.1 NG/ML
WBC # BLD: 7.6 K/UL (ref 4–11)

## 2022-12-15 NOTE — TELEPHONE ENCOUNTER
----- Message from Max sent at 12/15/2022  9:10 AM EST -----  Subject: Appointment Request    Reason for Call: Established Patient Appointment needed: Routine Existing   Condition Follow Up (Diabetes)    QUESTIONS    Reason for appointment request? No appointments available during search     Additional Information for Provider? Patient would like to speak with   provider about ongoing diarrhea.  She was seen in the ED on 12/10 and the   medication was changed   ---------------------------------------------------------------------------  --------------  [address] [employer]  [phone]; OK to leave message on voicemail  ---------------------------------------------------------------------------  --------------  SCRIPT ANSWERS  ANTONETTE Screen: Grazyna Barrera

## 2022-12-16 LAB
CREATININE URINE: 38.4 MG/DL (ref 28–259)
MICROALBUMIN UR-MCNC: 2.4 MG/DL
MICROALBUMIN/CREAT UR-RTO: 62.5 MG/G (ref 0–30)

## 2022-12-16 NOTE — TELEPHONE ENCOUNTER
JEREMY... Called and spoke to Rachel and she stated that she will look into it because it shows a lot of breaks in service.

## 2022-12-19 ENCOUNTER — TELEPHONE (OUTPATIENT)
Dept: CARDIOLOGY CLINIC | Age: 82
End: 2022-12-19

## 2022-12-19 NOTE — TELEPHONE ENCOUNTER
----- Message from TIFFANIE Lucas CNP sent at 12/16/2022 12:31 PM EST -----  Labs ok, fluid coming down.  Moses Sarabia

## 2023-01-04 ENCOUNTER — TELEPHONE (OUTPATIENT)
Dept: FAMILY MEDICINE CLINIC | Age: 83
End: 2023-01-04

## 2023-01-04 NOTE — TELEPHONE ENCOUNTER
Called Pt daughter, Gay Harmon, to get more clarity on what her questions are. Did not answer, LVM to call back.

## 2023-01-04 NOTE — TELEPHONE ENCOUNTER
----- Message from Shara Otto sent at 1/4/2023 12:06 PM EST -----  Subject: Message to Provider    QUESTIONS  Information for Provider? Pt Jim Rasmussen) is calling for information   about a referral for mother to GI specialist received by the [facility]. Please contact as soon as possible to discuss. ---------------------------------------------------------------------------  --------------  Tracie Factor INFO  [phone]; OK to leave message on voicemail  ---------------------------------------------------------------------------  --------------  SCRIPT ANSWERS  Relationship to Patient?  Self

## 2023-01-10 DIAGNOSIS — M15.9 PRIMARY OSTEOARTHRITIS INVOLVING MULTIPLE JOINTS: ICD-10-CM

## 2023-01-10 RX ORDER — HYDROCODONE BITARTRATE AND ACETAMINOPHEN 5; 325 MG/1; MG/1
1 TABLET ORAL 4 TIMES DAILY PRN
Qty: 120 TABLET | Refills: 0 | Status: SHIPPED | OUTPATIENT
Start: 2023-01-10 | End: 2023-02-09

## 2023-01-10 NOTE — TELEPHONE ENCOUNTER
Medication:   Requested Prescriptions     Pending Prescriptions Disp Refills    HYDROcodone-acetaminophen (NORCO) 5-325 MG per tablet 120 tablet 0     Sig: Take 1 tablet by mouth 4 times daily as needed for Pain for up to 30 days. Last Filled:  12/14/2022    Patient Phone Number: [phone] (home) [phone] (work)    Last appt: 11/29/2022   Next appt: 1/30/2023    Last [place]AR[place]:   RX Monitoring 6/1/2021   Attestation -   Periodic Controlled Substance Monitoring Possible medication side effects, risk of tolerance/dependence & alternative treatments discussed.      PDMP Monitoring:    Last PDMP Dio Preciado as Reviewed [facility]):  Review User Review Instant Review Result   Colby Esteves R 4/21/2022  4:20 PM Reviewed PDMP [1]     Preferred Pharmacy:   [facility] [id number] - [id number] [phone] Quinton Jaimes [phone]  [address] [place] [address]  Phone: [phone] Fax: [phone]    [facility] Mail [facility] - Lina Larsen [phone] - F [phone]  [address] [place] [address]  Phone: [phone] Fax: [phone]

## 2023-01-12 ENCOUNTER — HOSPITAL ENCOUNTER (OUTPATIENT)
Dept: CARDIAC CATH/INVASIVE PROCEDURES | Age: 83
Discharge: HOME | End: 2023-01-12
Attending: INTERNAL MEDICINE | Admitting: INTERNAL MEDICINE
Payer: MEDICARE

## 2023-01-12 VITALS
SYSTOLIC BLOOD PRESSURE: 140 MMHG | TEMPERATURE: 98 F | RESPIRATION RATE: 14 BRPM | HEART RATE: 70 BPM | OXYGEN SATURATION: 96 % | WEIGHT: 111 LBS | DIASTOLIC BLOOD PRESSURE: 54 MMHG | BODY MASS INDEX: 19.67 KG/M2 | HEIGHT: 63 IN

## 2023-01-12 LAB
LV EF: 58 %
LVEF MODALITY: NORMAL

## 2023-01-12 PROCEDURE — 93325 DOPPLER ECHO COLOR FLOW MAPG: CPT

## 2023-01-12 PROCEDURE — 99152 MOD SED SAME PHYS/QHP 5/>YRS: CPT | Performed by: INTERNAL MEDICINE

## 2023-01-12 PROCEDURE — 93312 ECHO TRANSESOPHAGEAL: CPT

## 2023-01-12 PROCEDURE — 7100000010 HC PHASE II RECOVERY - FIRST 15 MIN

## 2023-01-12 PROCEDURE — 99152 MOD SED SAME PHYS/QHP 5/>YRS: CPT

## 2023-01-12 PROCEDURE — 93320 DOPPLER ECHO COMPLETE: CPT

## 2023-01-12 RX ORDER — SODIUM CHLORIDE 0.9 % (FLUSH) 0.9 %
5-40 SYRINGE (ML) INJECTION PRN
Status: DISCONTINUED | OUTPATIENT
Start: 2023-01-12 | End: 2023-01-12 | Stop reason: HOSPADM

## 2023-01-12 NOTE — PROCEDURES
[place] [address]     Electrophysiology Procedure Note       Date of Procedure: 1/12/2023  Patient's Name: Lolita Suarez  YOB: 1940   Medical Record Number: [id number]  Procedure Performed by: Anthony Lemus MD    Procedures performed:    Trans-esophageal echocardiography    IV sedation. Start time: 10:12 AM  Stop time: 10:35 AM   Fentanyl: 100  Mcg  Versed: 1    Mg    An independent trained observer pushed medications at my direction    Indication of the procedure: Atrial fibrillation     Details of procedure: The patient was brought to the cath lab area in a fasting and non-sedated state. The risks, benefits and alternatives of the procedure were discussed with the patient. The patient opted to proceed with the procedure. Written informed consent was signed and placed in the chart. A timeout protocol was completed to identify the patient and the procedure being performed. IV sedation was provided with IV Versed, Fentanyl initially and DONA was performed     Preliminary DONA results are:   No Thrombus seen. Small feliciano-leak device of ~ 1-2 mm. Watchman device in good position in left atrial appendage. Patient tolerated the procedure and no immediate complications noted.      Anthony Lemus MD, MPH  [place] [address]   Office: [phone]  Fax: [phone]

## 2023-01-12 NOTE — H&P
[facility]   Electrophysiology Follow up   Date: 1/12/2023  I had the privilege of visiting Mauro Campos in the office. CC: Nikki echodensity  HPI: Mauro Campos is a 80 y.o. female with a history of paroxysmal atrial fib and underwent cardioversion in 2008. She had recurrent Atrial fib and underwent RFCA on 4/24/09 by Dr. Leela Salcido. .    Underwent dual chamber pacemaker [place]) on 11/8/13 for sick sinus syndrome , and AV block reported at [facility] 1490.       She is s/p  generator change out on 01/25/2021. Noted atrial lead issues, tried to repair the lead which was not effective and had a new RA lead inserted. She has significant Coronary disease with history of CABG and multiple interventions. On 02/02/2017 she had a C which showed chronic occlusion of the LAD and has seen interventional cardiology for potential interventions. She was seen on 08/02/2017 and reported having paloitations and chest pain for one months. ECG was done which showed atrial flutter irate in the 140's. She is anticoagulated with Xarelto. She underwent Cardioversion 08/02/2017. She had a PFT that showed severe restrictive disease and she has not followed up with Pulmonology. She was encouraged to see Dr. Radha Sutton. She was a previous smoker with suspected COPD and amiodarone is not an option for rate control. She is followed by heart failure. She has had recurrent falls. 12/16/2020  Admission at [facility] for concerns of left kidney contusion s/p fall. Underwent Watchman procedure on 5/17/2021. Follow up DONA with less than 3 mm feliciano-device leak. DONA 1/12/2022 showed  There is a small an echogenic mass on the surface of the device, consistent   with thrombus near the coumadin ridge. Devorah Sung presents today for DONA. She does not have any cardiac complaints at this time.  She does have some bruising from her Eliquis therapy but has remained compliant on this. She has a bad hip and has caused her to not be very active d/t this. We discussed her DONA in detail and need for repeat DONA around 7/12/2022. 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 On Eliquis 2.5 mg BID   Tolerating well. Discussed fall precautions. Plan for repeating DONA. Paroxysmal atrial fibrillation    ECG today shows SR, 1st degree AV block    Patient has high [id number] score 6 and requires anticoagulation to prevent thromboembolic events. [id number] Score: 5   S/p Watchman device in 5/2021. Follow up DONA 7/7/2021 with less than 3 mm leak. DONA 1/12/2022 showed  There is a small an echogenic mass on the surface of the device, consistent   with thrombus near the coumadin ridge. Continues on Eliquis 2.5 mg BID       Recurrent atrial flutter/tachycardia/fibrillation              Antiarrythmic therapy is limited since she has extensive structural heart disease. Amiodarone is not indicated because she has severe abnormal PFT. S/p DCCV 08/02/2017                S/p RFCA for Afib at [address] [place] [address] by Dr. Leela Salcido 2009                Toprol XL 25 mg BID              Xarelto 10 mg Creatinine Clearance 27 ml/min              High [id number] score and high risk for stroke and thromboembolism  . Shortness of Breath/ Severe COPD               PFT 2018 shows severe restrictive lung disease              ECHO 04/30/2020 LVEF 45 %     Bradycardia/Sinus node dysfunction              S/p dual chamber pacemaker, generator change with new RA lead 01/25/2021   The CIED was interrogated and programmed and I supervised and reviewed all the data. All findings and changes are in device interrogation sheet and reflect my personal interpretation and changes and is scanned to Epic. CAD              Stable               On BB. Allergy ASA      HTN  -Controlled  -BP goal <130/80  -Home BP monitoring encouraged, printed information provided on how to accurately measure BP at home.  -Counseled to follow a low salt diet to assure blood pressure remains controlled for cardiovascular risk factor modification.   -The patient is counseled to get regular exercise 3-5 times per week and maintain a healthy weight reduce cardiovascular risk factors.          Chronic Systolic Heart failure    Followed by heart failure   Echo 04/30/2020 LVEF 45 %   Aldactone 12.5 mg 3 times weekly,Toprol XL BID       Patient Active Problem List    Diagnosis Date Noted    Protein calorie malnutrition 11/29/2022    Hoarseness, persistent 10/29/2022    Coronary artery disease involving native heart without angina pectoris, unspecified vessel or lesion type 10/21/2022    Chronic renal failure, stage 3b ([address] 06/28/2022    Chronic right sacroiliac pain 05/09/2022    Postcholecystectomy diarrhea 05/09/2022    Pacemaker 04/24/2017    Cerebrovascular accident (CVA) ([address]     HTN (hypertension), benign     Fibrocystic breast 03/03/2017    Allergic rhinitis 05/26/2015    Sick sinus syndrome ([address] 11/19/2013    COPD (chronic obstructive pulmonary disease) ([address] 02/14/2013    Restrictive lung disease 02/14/2013    Non-rheumatic mitral regurgitation 07/20/2012    Chronic gouty arthropathy     Acquired hypothyroidism     Essential hypertension, benign 05/16/2011    Mixed hyperlipidemia 05/16/2011    Presence of Watchman left atrial appendage closure device 05/17/2021    Symptomatic bradycardia     Pacemaker lead failure     Memory loss due to medical condition 09/09/2020    Bilateral sensorineural hearing loss 07/15/2020    Iron deficiency anemia 04/30/2020    Peripheral vertigo, bilateral     PAF (paroxysmal atrial fibrillation) ([address]     Dyslipidemia     Ischemic cardiomyopathy 12/04/2019    Idiopathic peripheral neuropathy 11/22/2019    Atherosclerosis of native arteries of extremities with intermittent claudication, bilateral legs ([address]     Stage 3 chronic kidney disease ([address] 07/22/2019    PAD (peripheral artery disease) ([address] 07/22/2019    Chronic combined systolic (congestive) and diastolic (congestive) heart failure ([address] 12/28/2018    Age related osteoporosis 05/01/2018    Chronic total occlusion of native coronary artery 04/12/2018    Tremor 11/08/2017    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/10/2017    Vitamin D deficiency 08/10/2017    PAT (paroxysmal atrial tachycardia) ([address] 08/08/2017     Diagnostic studies:   ECG 4/12/22  SR  423 QTcH, 103 QRS    DONA 1/12/2022   Summary   Normal left ventricle size, wall thickness, and systolic function with an   estimated ejection fraction of 55-60%. Mitral valve leaflets appear moderately thickened. The posterior leaflet   appears severely restricted. Moderate mitral regurgitation is present. Mitral annular calcification. Left atrial size is dilated. There is a Watchman device seated with a   feliciano-device leak measured at 2 mm on the anterior side. There is a small an echogenic mass on the surface of the device, consistent   with thrombus near the coumadin ridge. Mild aortic regurgitation. The thoracic aorta has moderate-severe plaque. Moderate tricuspid regurgitation. ECHO 04/30/2020   Left ventricular cavity size is normal. Normal left ventricular wall   thickness. Left ventricular function appears to be reduced with an estimated   ejection fraction of 45%. Diffuse hypokinesis. Echo 7/19/19   Summary     Left ventricle - normal size, thickness, reduced function with EF of 45%     LV wall motion - diffuse hypokinesis. Mitral valve - thickened, annular calcification, moderate regurgitation     Aortic valve - thickened, calcified, mild regurgitation     Tricuspid valve - mild regurgitation with PASP of 25mmHg     Pulmonic valve - trivial regurgitation     Biatrial enlargement     Pacemaker / ICD lead is visualized in the right heart.      I independently reviewed the cardiac diagnostic studies, ECG and relevant imaging studies. [facility] 05/20/2019  Findings:                      LM       Normal              LAD     50% ostial, mid 100%              Cx        40% OM1 at bifurcation              RCA     Mid 100%              LVG     Not performed              EDP     15 mmHg              L-LAD  Patent              S-PDA Known occluded  Severe Ca++:None  Post Cath Dx:Stable CAD, no apparent culprit for NSTEMI  Intervention:  None  Med Rec:        Continue aggressive med tx                          Continue plavix, no asa due      Cath: 2/3/17 showed 100% prox RCA with bridging collateral, 100% SVG to RCA, 100% prox LAD after D1, D1 stent widely patent, Mild Cx disease, Normal LV FXN--EF 60%--EDP 12 post hydration. Stable anatomy        Nuc stress 6/28/16: ([facility])   Final Conclusions/Summary  Stress ECG Impressions: No significant ST changes during vasodilator infusion  Summary: - Abnormal moderat risk stress test with moderate size and severity  anterolateral wall ischemia - Normal LV size and systolic function         I independently reviewed the cardiac diagnostic studies, ECG and relevant imaging studies. Lab Results   Component Value Date    LVEF 48 11/15/2022    LVEFMODE Echo 07/19/2019     Lab Results   Component Value Date    TSH 0.40 09/11/2022         Physical Examination:  Vitals:    01/12/23 0933   BP: (!) 140/54   Pulse: 70   Resp: 14   Temp: 98 °F (36.7 °C)   SpO2: 96%      Wt Readings from Last 3 Encounters:   01/12/23 111 lb (50.3 kg)   11/29/22 116 lb 3.2 oz (52.7 kg)   11/29/22 115 lb 6 oz (52.3 kg)       Constitutional: Oriented. No distress. Head: Normocephalic and atraumatic. Mouth/Throat: Oropharynx is clear and moist.   Eyes: Conjunctivae normal. EOM are normal.   Neck: Neck supple. No JVD present. Cardiovascular: Normal rate, regular rhythm, S1&S2. Pulmonary/Chest: Bilateral respiratory sounds. No rhonchi. Abdominal: Soft. No tenderness.    Musculoskeletal: No tenderness. No edema    Lymphadenopathy: Has no cervical adenopathy. Neurological: Alert and oriented. Follows command, No Gross deficit   Skin: Skin is warm, No rash noted. Psychiatric: Has a normal behavior       Review of System:  [x] Full ROS obtained and negative except as mentioned in HPI    Prior to Admission medications    Medication Sig Start Date End Date Taking? Authorizing Provider   HYDROcodone-acetaminophen (NORCO) 5-325 MG per tablet Take 1 tablet by mouth 4 times daily as needed for Pain for up to 30 days.  1/10/23 2/9/23  Karena Marks MD   vancomycin (VANCOCIN) 250 MG capsule 1 capsule 3 times a day for 7 days, then 1 capsule twice daily for 7 days then ,1 capsule daily for 7 days 11/30/22   Karena Marks MD   torsemide (DEMADEX) 20 MG tablet 10 mg every day except Sunday and as directed for weight gain 11/29/22   TIFFANIE Edge   clopidogrel (PLAVIX) 75 MG tablet Take 1 tablet by mouth daily 11/25/22   Kimberly Lu MD   carvedilol (COREG) 25 MG tablet Take 1 tablet by mouth 2 times daily (with meals)  Patient taking differently: Take 25 mg by mouth daily 11/7/22   TIFFANIE Rodriges - CNP   rosuvastatin (CRESTOR) 5 MG tablet Take 1 tablet by mouth nightly 11/7/22   TIFFANIE Rodriges - CNP   levothyroxine (SYNTHROID) 125 MCG tablet Take 1 tablet by mouth Daily 11/7/22   TIFFANIE Rodriges - CNP   aspirin EC 81 MG EC tablet Take 1 tablet by mouth daily 10/17/22   Robby Bennett MD   topiramate (TOPAMAX) 50 MG tablet TAKE 2 TABLETS TWICE DAILY 10/10/22   Karena Marks MD   vitamin D (ERGOCALCIFEROL) 1.25 MG (74933 UT) CAPS capsule TAKE 1 CAPSULE ONCE A WEEK 8/3/22   Karena Marks MD   allopurinol (ZYLOPRIM) 100 MG tablet TAKE 1 TABLET EVERY DAY 7/13/22   Karena Marks MD   nitroGLYCERIN (NITROLINGUAL) 0.4 MG/SPRAY 0.4 mg spray Place 1 spray under the tongue every 5 minutes as needed for Chest pain 2/8/22   Karena Marks MD   fluticasone (FLONASE) 50 MCG/ACT nasal spray 2 sprays by Each Nostril route daily 1/25/22   Marybeth Bonds Dumouchealvaroe, APRN - CNP       Allergies   Allergen Reactions    Aspirin Nausea Only    Diltiazem Anaphylaxis    Diltiazem Hcl Anaphylaxis    Lorazepam Other (See Comments)     hallucinations  hallucinations  hallucinations    Sulfa Antibiotics Rash and Hives    Atorvastatin Other (See Comments)     Muscle pains  Muscle pains  Muscle pains    Dabigatran Nausea Only     And indigestion  And indigestion    Aka Pradaxa  And indigestion  And indigestion  And indigestion    Aka Pradaxa  And indigestion  And indigestion  And indigestion    Aka Pradaxa    Mysoline [Primidone]     Nsaids      Other reaction(s): Unknown    Other Other (See Comments)     Nitroglycerin patches causes severe headaches    Primidone      Other reaction(s): Unknown       Social History:  Reviewed. reports that she quit smoking about 36 years ago. Her smoking use included cigarettes. She has a 28.00 pack-year smoking history. She has never used smokeless tobacco. She reports current alcohol use. She reports that she does not use drugs. Family History:  Reviewed. Reviewed. No family history of SCD. Relevant and available labs, and cardiovascular diagnostics reviewed. Reviewed. I independently reviewed relevant and available cardiac diagnostic tests ECG, CXR, Echo, Stress test, Device interrogation, Holter, CT scan. Complex medical condition with multiple medical problems affecting prognosis and outcome of EP interventions      All questions and concerns were addressed to the patient/family. Alternatives to my treatment were discussed. I have discussed the above stated plan and the patient verbalized understanding and agreed with the plan. NOTE: This report was transcribed using voice recognition software. Every effort was made to ensure accuracy, however, inadvertent computerized transcription errors may be present.      Melani Alves MD, [address]   Office: [phone]  Fax: [phone]      H&P Update    I have reviewed the history and physical and examined the patient and updated with relevant changes. Consent: I have discussed with the patient and/or the patient representative the indication, alternatives, and the possible risks and/or complications of the planned procedure and the anesthesia methods. The patient and/or patient representative appear to understand and agree to proceed. Vitals:    01/12/23 0933   BP: (!) 140/54   Pulse: 70   Resp: 14   Temp: 98 °F (36.7 °C)   SpO2: 96%     Prior to Admission medications    Medication Sig Start Date End Date Taking? Authorizing Provider   HYDROcodone-acetaminophen (NORCO) 5-325 MG per tablet Take 1 tablet by mouth 4 times daily as needed for Pain for up to 30 days.  1/10/23 2/9/23  Tasneem Ordoñez MD   vancomycin (VANCOCIN) 250 MG capsule 1 capsule 3 times a day for 7 days, then 1 capsule twice daily for 7 days then ,1 capsule daily for 7 days 11/30/22   Tasneem Ordoñez MD   torsemide (DEMADEX) 20 MG tablet 10 mg every day except Sunday and as directed for weight gain 11/29/22   Annia Olszewski, APRN - CNS   clopidogrel (PLAVIX) 75 MG tablet Take 1 tablet by mouth daily 11/25/22   Ramses Nino MD   carvedilol (COREG) 25 MG tablet Take 1 tablet by mouth 2 times daily (with meals)  Patient taking differently: Take 25 mg by mouth daily 11/7/22   TIFFANIE Kaur CNP   rosuvastatin (CRESTOR) 5 MG tablet Take 1 tablet by mouth nightly 11/7/22   TIFFANIE Kaur - CNP   levothyroxine (SYNTHROID) 125 MCG tablet Take 1 tablet by mouth Daily 11/7/22   TIFFANIE Kaur CNP   aspirin EC 81 MG EC tablet Take 1 tablet by mouth daily 10/17/22   Elvira Gamino MD   topiramate (TOPAMAX) 50 MG tablet TAKE 2 TABLETS TWICE DAILY 10/10/22   Tasneem Ordoñez MD   vitamin D (ERGOCALCIFEROL) 1.25 MG (84662 UT) CAPS capsule TAKE 1 CAPSULE ONCE A WEEK 8/3/22 Starr Tay MD   allopurinol (ZYLOPRIM) 100 MG tablet TAKE 1 TABLET EVERY DAY 7/13/22   Starr Tay MD   nitroGLYCERIN (NITROLINGUAL) 0.4 MG/SPRAY 0.4 mg spray Place 1 spray under the tongue every 5 minutes as needed for Chest pain 2/8/22   Starr Tay MD   fluticasone [facility]) 50 MCG/ACT nasal spray 2 sprays by Each Nostril route daily 1/25/22   Dena Lind, TIFFANIE - CNP     Past Medical History:   Diagnosis Date    Allergic rhinitis, cause unspecified     Arthritis     Atrial fibrillation ([address]     Bronchopneumonia     CAD (coronary artery disease)     stent:  post cataract surgery (CABG)    Cerebral artery occlusion with cerebral infarction [facility])     TIA    Chronic gouty arthropathy     Chronic kidney disease     Congestive heart failure, unspecified     Degeneration of cervical intervertebral disc     Essential and other specified forms of tremor     Gout     HIGH CHOLESTEROL     History of CVA (cerebrovascular accident)     Hx of blood clots     Hypertension     Influenza 12/23/2017    Leakage of Watchman left atrial appendage closure device     Mitral valve stenosis and aortic valve insufficiency     Movement disorder     back problems    Pacemaker     Peptic ulcer, unspecified site, unspecified as acute or chronic, without mention of hemorrhage, perforation, or obstruction     Thyroid disease     Unspecified disorder of kidney and ureter     Unspecified hypothyroidism      Past Surgical History:   Procedure Laterality Date    BACK SURGERY      CARDIAC CATHETERIZATION  7/2012    CARDIAC CATHETERIZATION  08/07/2018    Unsuccesful  of RCA    CARDIAC SURGERY      CABG & Cardiac ablation    CHOLECYSTECTOMY      CORONARY ARTERY BYPASS GRAFT  1987    LIMA- Diag/LAD, SVG- RCA    FEMORAL BYPASS Left 8/22/2019    LEFT FEMORAL TO POPLITEAL BYPASS GRAFT performed by Dagoberto Kimble MD at [address]    FEMORAL-FEMORAL BYPASS GRAFT N/A 8/22/2019    FEMORAL TO FEMORAL BYPASS performed by Dejon Phillips MD at [address] Left 03/16/2017    Left hip pinning    IR KYPHOPLASTY LUMBAR FIRST LEVEL  5/14/2021    IR KYPHOPLASTY LUMBAR FIRST LEVEL 5/14/2021 [facility] SPECIAL PROCEDURES    JOINT REPLACEMENT      [facility] NJX AA&/STRD TFRML EPI LUMBAR/SACRAL 1 LEVEL Right 12/3/2018    RIGHT L3 AND L4 LUMBAR TRANSFORAMINAL EPIRUAL STEROID INJECTION WITH FLUOROSCOPY performed by Petrona Dickson MD at [facility]    PTCA  11/04/2014    MARLENY - 3.0 x 28 to the Ist Diag    SHOULDER SURGERY      left     Allergies   Allergen Reactions    Aspirin Nausea Only    Diltiazem Anaphylaxis    Diltiazem Hcl Anaphylaxis    Lorazepam Other (See Comments)     hallucinations  hallucinations  hallucinations    Sulfa Antibiotics Rash and Hives    Atorvastatin Other (See Comments)     Muscle pains  Muscle pains  Muscle pains    Dabigatran Nausea Only     And indigestion  And indigestion    Aka Pradaxa  And indigestion  And indigestion  And indigestion    Aka Pradaxa  And indigestion  And indigestion  And indigestion    Aka Pradaxa    Mysoline [Primidone]     Nsaids      Other reaction(s): Unknown    Other Other (See Comments)     Nitroglycerin patches causes severe headaches    Primidone      Other reaction(s): Unknown       Pre-Sedation Documentation and Exam:   I have personally completed a history, physical exam & review of systems for this patient (see notes).     Mallampati Airway Assessment:  Class II     Prior History of Anesthesia Complications:   None    ASA Classification:  Class 2 - A normal healthy patient with mild systemic disease    Sedation/ Anesthesia Plan:   Intravenous sedation    Medications Planned:   Midazolam (Versed) and Fentanyl intravenously    Patient is an appropriate candidate for plan of sedation:   Yes    Electronically signed by Aminata Suero MD on 1/12/2023 at 10:02 AM

## 2023-01-17 ENCOUNTER — OFFICE VISIT (OUTPATIENT)
Dept: CARDIOLOGY CLINIC | Age: 83
End: 2023-01-17
Payer: MEDICARE

## 2023-01-17 VITALS
SYSTOLIC BLOOD PRESSURE: 144 MMHG | BODY MASS INDEX: 20.38 KG/M2 | OXYGEN SATURATION: 99 % | DIASTOLIC BLOOD PRESSURE: 54 MMHG | HEART RATE: 70 BPM | HEIGHT: 63 IN | WEIGHT: 115 LBS

## 2023-01-17 DIAGNOSIS — I25.83 CORONARY ARTERY DISEASE DUE TO LIPID RICH PLAQUE: ICD-10-CM

## 2023-01-17 DIAGNOSIS — I48.20 CHRONIC ATRIAL FIBRILLATION (HCC): ICD-10-CM

## 2023-01-17 DIAGNOSIS — N28.9 RENAL INSUFFICIENCY: ICD-10-CM

## 2023-01-17 DIAGNOSIS — I25.10 CORONARY ARTERY DISEASE DUE TO LIPID RICH PLAQUE: ICD-10-CM

## 2023-01-17 DIAGNOSIS — I50.22 CHRONIC SYSTOLIC HEART FAILURE (HCC): Primary | ICD-10-CM

## 2023-01-17 PROCEDURE — G8484 FLU IMMUNIZE NO ADMIN: HCPCS | Performed by: CLINICAL NURSE SPECIALIST

## 2023-01-17 PROCEDURE — G8399 PT W/DXA RESULTS DOCUMENT: HCPCS | Performed by: CLINICAL NURSE SPECIALIST

## 2023-01-17 PROCEDURE — 1123F ACP DISCUSS/DSCN MKR DOCD: CPT | Performed by: CLINICAL NURSE SPECIALIST

## 2023-01-17 PROCEDURE — 1090F PRES/ABSN URINE INCON ASSESS: CPT | Performed by: CLINICAL NURSE SPECIALIST

## 2023-01-17 PROCEDURE — G8427 DOCREV CUR MEDS BY ELIG CLIN: HCPCS | Performed by: CLINICAL NURSE SPECIALIST

## 2023-01-17 PROCEDURE — 99214 OFFICE O/P EST MOD 30 MIN: CPT | Performed by: CLINICAL NURSE SPECIALIST

## 2023-01-17 PROCEDURE — 3078F DIAST BP <80 MM HG: CPT | Performed by: CLINICAL NURSE SPECIALIST

## 2023-01-17 PROCEDURE — G8420 CALC BMI NORM PARAMETERS: HCPCS | Performed by: CLINICAL NURSE SPECIALIST

## 2023-01-17 PROCEDURE — 1036F TOBACCO NON-USER: CPT | Performed by: CLINICAL NURSE SPECIALIST

## 2023-01-17 PROCEDURE — 3077F SYST BP >= 140 MM HG: CPT | Performed by: CLINICAL NURSE SPECIALIST

## 2023-01-17 RX ORDER — NITROGLYCERIN 400 UG/1
1 SPRAY ORAL EVERY 5 MIN PRN
Qty: 4.9 G | Refills: 1 | Status: SHIPPED | OUTPATIENT
Start: 2023-01-17

## 2023-01-17 NOTE — PROGRESS NOTES
[address]  Progress Note    Primary Care Doctor:  Gi Mckeon MD    Chief Complaint   Patient presents with    Congestive Heart Failure        History of Present Illness:  80 y.o. female with history of Ms. Kyle Rodrigues She has a history of atrial fibrillation (on xarelto), TIA, chronic kidney disease, CHF, hypertension, hyperlipidemia, mitral valve disease, hypothyroidism. Her last LVEF was 45% on 7/19/19. 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 Plan is for a PCI in about 30 days  10/19-21/2022 for LHC with PCI to LM and cx, she was given 40 mg IV lasix for bnp of 14K (baseline is 3-4000) and BP extremely elevated, started on coreg. She had complex tachycardia vs AT and has a MCOT monitor. Watchman procedure      I had the pleasure of seeing Heather Galvin in follow up for systolic heart failure with improved LVEF. She is ambulatory and with her daughter. Her weight is stable, no chest pain, palpitations, lightheadedness, shortness of breath. She has very little edema in her lower ankles. She is taking all her medications. DONA status post watchman done.     Past Medical History:   has a past medical history of Allergic rhinitis, cause unspecified, Arthritis, Atrial fibrillation ([address] Bronchopneumonia, CAD (coronary artery disease), Cerebral artery occlusion with cerebral infarction ([address] Chronic gouty arthropathy, Chronic kidney disease, Congestive heart failure, unspecified, Degeneration of cervical intervertebral disc, Essential and other specified forms of tremor, Gout, HIGH CHOLESTEROL, History of CVA (cerebrovascular accident), Hx of blood clots, Hypertension, Influenza, Leakage of Watchman left atrial appendage closure device, Mitral valve stenosis and aortic valve insufficiency, Movement disorder, Pacemaker, Peptic ulcer, unspecified site, unspecified as acute or chronic, without mention of hemorrhage, perforation, or obstruction, Thyroid disease, Unspecified disorder of kidney and ureter, and Unspecified hypothyroidism.  Surgical History:   has a past surgical history that includes back surgery; Cholecystectomy; Cardiac surgery; Coronary artery bypass graft (1987); shoulder surgery; Cardiac catheterization (7/2012); hip surgery (Left, 03/16/2017); joint replacement; Cardiac catheterization (08/07/2018); Percutaneous Transluminal Coronary Angio (11/04/2014); pr njx aa&/strd tfrml epi lumbar/sacral 1 level (Right, 12/3/2018); Femoral-femoral Bypass Graft (N/A, 8/22/2019); femoral bypass (Left, 8/22/2019); and IR KYPHOPLASTY LUMBAR 1 VERTEBRAL BODY (5/14/2021).   Social History:   reports that she quit smoking about 36 years ago. Her smoking use included cigarettes. She has a 28.00 pack-year smoking history. She has never used smokeless tobacco. She reports current alcohol use. She reports that she does not use drugs.   Family History:   Family History   Problem Relation Age of Onset    Cancer Sister     Heart Disease Sister     Diabetes Brother     Hypertension Brother     Heart Disease Brother     Stroke Maternal Aunt     Heart Disease Maternal Aunt     Diabetes Maternal Uncle     Hypertension Paternal Aunt     Cancer Maternal Grandmother     Cancer Paternal Grandmother     Rheum Arthritis Neg Hx     Lupus Neg Hx        Home Medications:  Prior to Admission medications    Medication Sig Start Date End Date Taking? Authorizing Provider   nitroGLYCERIN (NITROLINGUAL) 0.4 MG/SPRAY 0.4 mg spray Place 1 spray under the tongue every 5 minutes as needed for Chest pain 1/17/23  Yes Tory Mcgovern APRN - CNS   HYDROcodone-acetaminophen (NORCO) 5-325 MG per tablet Take 1 tablet by mouth 4 times daily as needed for Pain for up  to 30 days. 1/10/23 2/9/23 Yes Ania Robles MD   torsemide (DEMADEX) 20 MG tablet 10 mg every day except  and as directed for weight gain 22  Yes TIFFANIE Valentine   clopidogrel (PLAVIX) 75 MG tablet Take 1 tablet by mouth daily 22  Yes Elda Mehta MD   carvedilol (COREG) 25 MG tablet Take 1 tablet by mouth 2 times daily (with meals)  Patient taking differently: Take 25 mg by mouth 2 times daily 22  Yes TIFFANIE Angelo CNP   rosuvastatin (CRESTOR) 5 MG tablet Take 1 tablet by mouth nightly 22  Yes TIFFANIE Angelo CNP   levothyroxine (SYNTHROID) 125 MCG tablet Take 1 tablet by mouth Daily 22  Yes TIFFANIE Angelo CNP   aspirin EC 81 MG EC tablet Take 1 tablet by mouth daily 10/17/22  Yes Vicente Ruth MD   topiramate (TOPAMAX) 50 MG tablet TAKE 2 TABLETS TWICE DAILY 10/10/22  Yes Ania Robles MD   vitamin D (ERGOCALCIFEROL) 1.25 MG (34956 UT) CAPS capsule TAKE 1 CAPSULE ONCE A WEEK 8/3/22  Yes Ania Robles MD   allopurinol (ZYLOPRIM) 100 MG tablet TAKE 1 TABLET EVERY DAY 22  Yes Ania Robles MD   fluticasone [facility]) 50 MCG/ACT nasal spray 2 sprays by Each Nostril route daily 22  Yes TIFFANIE Conteh - CNP        Allergies:  Aspirin, Diltiazem, Diltiazem hcl, Lorazepam, Sulfa antibiotics, Atorvastatin, Dabigatran, Mysoline [primidone], Nsaids, Other, and Primidone     Review of Systems:   Constitutional: there has been no unanticipated weight loss. There's been no change in energy level, sleep pattern, or activity level. Eyes: No visual changes or diplopia. No scleral icterus. ENT: No Headaches, hearing loss or vertigo. No mouth sores or sore throat. Cardiovascular: Reviewed in HPI  Respiratory: No cough or wheezing, no sputum production. No hematemesis. Gastrointestinal: No abdominal pain, appetite loss, blood in stools. No change in bowel or bladder habits.  States she feels bloated   Genitourinary: No dysuria, trouble voiding, or hematuria.  Musculoskeletal:  No gait disturbance, weakness or joint complaints.  Integumentary: No rash or pruritis.  Neurological: No headache, diplopia, change in muscle strength, numbness or tingling. No change in gait, balance, coordination, mood, affect, memory, mentation, behavior.  Psychiatric: No anxiety, no depression.  Endocrine: No malaise, fatigue or temperature intolerance. No excessive thirst, fluid intake, or urination. No tremor.  Hematologic/Lymphatic: No abnormal bruising or bleeding, blood clots or swollen lymph nodes.  Allergic/Immunologic: No nasal congestion or hives.    Physical Examination:    Vitals:    01/17/23 1335 01/17/23 1338   BP: (!) 142/54 (!) 144/54   Pulse: 70    SpO2: 99%    Weight: 115 lb (52.2 kg)    Height: 5' 3\" (1.6 m)           Constitutional and General Appearance: Warm and dry, no apparent distress, normal coloration  HEENT:  Normocephalic, atraumatic  Respiratory:  Normal excursion and expansion without use of accessory muscles  Resp Auscultation: Normal breath sounds without dullness  Cardiovascular:  The apical impulses not displaced  Heart tones are crisp and normal  Normal JVP  Regular rhythm   Peripheral pulses are symmetrical and full  There is no clubbing, cyanosis of the extremities.  R>L ankle edema  Pedal Pulses: 2+ and equal   Abdomen:  No masses or tenderness  Liver/Spleen: No Abnormalities Noted  Neurological/Psychiatric:  Alert and oriented in all spheres  Moves all extremities well  Exhibits normal gait balance and coordination  No abnormalities of mood, affect, memory, mentation, or behavior are noted    Lab Data:    CBC:   Lab Results   Component Value Date/Time    WBC 7.6 12/15/2022 02:01 PM    WBC 8.8 12/10/2022 01:01 PM    WBC 6.4 11/15/2022 05:19 AM    RBC 4.90 12/15/2022 02:01 PM    RBC 4.90 12/10/2022 01:01 PM    RBC 4.83 11/15/2022 05:19 AM    HGB 12.0 12/15/2022 02:01 PM    HGB 12.2 12/10/2022  01:01 PM    HGB 11.9 11/15/2022 05:19 AM    HCT 40.2 12/15/2022 02:01 PM    HCT 39.9 12/10/2022 01:01 PM    HCT 39.7 11/15/2022 05:19 AM    MCV 82.0 12/15/2022 02:01 PM    MCV 81.4 12/10/2022 01:01 PM    MCV 82.2 11/15/2022 05:19 AM    RDW 19.9 12/15/2022 02:01 PM    RDW 19.8 12/10/2022 01:01 PM    RDW 19.9 11/15/2022 05:19 AM     12/15/2022 02:01 PM     12/10/2022 01:01 PM     11/15/2022 05:19 AM     BMP:  Lab Results   Component Value Date/Time     12/15/2022 02:01 PM     12/10/2022 01:01 PM     11/16/2022 04:34 AM    K 4.1 12/15/2022 02:01 PM    K 4.4 12/10/2022 01:01 PM    K 3.9 11/16/2022 04:34 AM    K 4.2 11/15/2022 05:19 AM    K 3.5 11/12/2022 04:33 AM    K 4.3 11/11/2022 06:55 PM     12/15/2022 02:01 PM     12/10/2022 01:01 PM     11/16/2022 04:34 AM    CO2 24 12/15/2022 02:01 PM    CO2 22 12/10/2022 01:01 PM    CO2 17 11/16/2022 04:34 AM    PHOS 3.6 12/15/2022 02:01 PM    PHOS 2.8 11/15/2022 05:19 AM    PHOS 3.2 11/14/2022 05:39 AM    BUN 37 12/15/2022 02:01 PM    BUN 33 12/10/2022 01:01 PM    BUN 29 11/16/2022 04:34 AM    CREATININE 1.4 12/15/2022 02:01 PM    CREATININE 1.2 12/10/2022 01:01 PM    CREATININE 1.1 11/16/2022 04:34 AM     BNP:   Lab Results   Component Value Date/Time    PROBNP 6,148 12/15/2022 02:01 PM    PROBNP 8,979 11/11/2022 06:55 PM    PROBNP 14,854 10/21/2022 04:41 AM     Cardiac Imaging:  Echo 1/12/2023  Summary  Normal left ventricle size, wall thickness, and systolic function with an estimated ejection fraction of 55-60%. Left atrial size is dilated. There is a Watchman device seated with a feliciano-device leak measured at 1 mm on the anterior side. No thrombus seen. Moderate tricuspid regurgitation. Echo 11/15/2022  Summary  Left ventricle is dilated with mild concentric left ventricular hypertrophy. Overall, left ventricular systolic function is mildly depressed. Ejection fraction is visually estimated to be 45-50%.   No obvious regional wall motion abnormalities are noted.  Grade III diastolic dysfunction with elevated LV filling pressures. E/e' = 31.0.  There is at least moderate mitral regurgitation.  Moderate tricuspid regurgitation. Estimated pulmonary artery systolic pressure is elevated at 44 mmHg assuming a right atrial  pressure of 15 mmHg. Mild pulmonary hypertension.  Biatrial enlargement.  The aortic valve appears sclerotic but opens well. Mild aortic regurgitation.    [facility]: 10/19/22 Dr Singletary  Findings  Artery Findings/Result   LM Ulcerated, 80% to mid, eccentric   LAD NA   Cx 80% prox   RI N/A   RCA N/A   LVEDP     LVG        Intervention(s)  Artery Location Intervention Result   LM Os-prox Xience 3.5X12 (PD with 4.0NC to 18atm) No residual   Cx prox Xience 3.5X18  No residual      Post Cath Dx:       CAD as above      Cardiac CTA: 9/15/22  FINDINGS:   Left atrium:       Watch man device is seen.  There is only a small amount of thrombus in the   watch man device..       The left atrial appendage is not thrombosed.  Contrast is seen in the left   atrial appendage, compatible with leakage..       On axial images 18.  There is a crescentic area of contrast flowing around   the watch man device superiorly in the left atrial appendage..  However, no   definite fistulous tract is seen connecting this to the left atrium.       Circumflex coronary artery abuts the inferior aspect of the watch man device.   There is severe calcified and noncalcified plaque seen in the circumflex.  A   definite fistulous tract between the circumflex and the left atrial appendage   is not clearly identified by CT..       Native right coronary artery and left anterior descending coronary artery   heavily calcified.       Mediastinum: Heart is enlarged.  Thyroid gland unremarkable.  Streak artifact   is seen from pacer.       Ascending aorta measures 3.5 cm.  No intimal flap is seen.  No pericardial   effusion.  Small hiatal hernia seen.  No  central pulmonary embolus   identified. Small mediastinal and hilar nodes are noted. Right hilar [place]   conglomerate, measures 1.9 cm x 2.4 cm. .  Calcification or clip seen in the   region of mitral valve. Lungs/Pleura: Respiratory motion artifact limits evaluation of fine pulmonary   parenchymal change. Mild underlying emphysema is seen. De pendant opacity   is seen at the lung bases, likely atelectasis. Pleural calcifications seen   on the left. Right hilar nodes are seen, similar compared to conventional chest CT August 2020       Upper Abdomen: Low attenuation is seen liver, compatible with fatty   infiltration. Soft Tissues/Bones: No acute bone or soft tissue abnormality. Cath: 9/12/22 Dr Deanna Jarquin  Findings  Artery Findings/Result   LM 30% ostial to proximal   LAD Patent prox to mid stent, 60% diffuse ostial to mid   Cx 80% prox, fistula noted from Cx proper v Cx branch to left atrium/left atrial appendage (not present on prior angiogram done before placement of Watchman device, suspect erosion), OM1 70% bifurcational, inferior branch of OM1 50% mid,    RI N/A   RCA Mid 100% with R-R collaterals. L-LAD patent   LVEDP NA   LVG NA      RHC:  RA RV PA FANNIE WP TCO TCI JODI MCFP RA% PA%   2 30/3 35/15 23 JULIOCESAR 4.5 3.0 4.7 3.1 70 67%      Intervention(s)  None     Post Cath Dx:       CAD as above  Consider PCI of Cx in future if Cx proper geographically  from [facility] device on CTC    DONA Dr Wright Sotelo 7/20/2022  Summary   Normal left ventricle size, wall thickness, and systolic function with an   estimated ejection fraction of 55-60%. Moderate mitral regurgitation is present. Left atrial size is dilated. There is a Watchman device seated with a feliciano-device leak measured at 3 mm   on the anterior side. No thrombus seen. Moderate tricuspid regurgitation.    Systolic pulmonary artery pressure (SPAP) estimated at 41 mmHg (RA pressure   3 mmHg), consistent with mild pulmonary hypertension. Echo 1/12/2022  Summary   Normal left ventricle size, wall thickness, and systolic function with an   estimated ejection fraction of 55-60%. Mitral valve leaflets appear moderately thickened. The posterior leaflet   appears severely restricted. Moderate mitral regurgitation is present. Mitral annular calcification. Left atrial size is dilated. There is a Watchman device seated with a   feliciano-device leak measured at 2 mm on the anterior side. There is a small an echogenic mass on the surface of the device, consistent   with thrombus near the coumadin ridge. DONA 7/7/2021  Summary   Ejection fraction is visually estimated to be 45-50 %. Mild thickening of anterior, posterior leaflet of mitral valve. There is decreased leaflet motion and \"hockey stick\" appearance of the   mitral leaflets. posterior leaflet is immobile. Mean Gradient 4 mmHg. Moderate mitral regurgitation is present. There is no evidence of mass or thrombus in the left atrium or appendage. Watchman in place. No thrombus. <3 mm leak. The left atrium is dilated. A PFO is present. Aortic valve leaflets appear thickened. Tricuspid aortic valve. Mild aortic regurgitation is present. Plaque is noted in the thoracic aorta. Moderate tricuspid regurgitation. PASP 51 mmHg    [facility] 3/19/2021 Dr Marshal Sheldon  Artery Findings/Result   LM Normal   LAD 50% ostial, 50% mid instent   Cx 50% mid at OM1 bifurcation, 30% OM1 mid at bifurcation   RI NA   % prox with multiple R-R collaterals   LVEDP 8   LVG NA due to renal failure      Intervention:         None     Post Cath Dx:       Stable CAD  Possible angina from  of RCA - poor candidate for  intervention with renal failure    Echo 4/30/2020   Left ventricular cavity size is normal. Normal left ventricular wall   thickness. Left ventricular function appears to be reduced with an estimated   ejection fraction of 45%. Diffuse hypokinesis.     Echo 7/19/2019   Summary    Left ventricle - normal size, thickness, reduced function with EF of 45%  LV wall motion - diffuse hypokinesis. Mitral valve - thickened, annular calcification, moderate regurgitation  Aortic valve - thickened, calcified, mild regurgitation  Tricuspid valve - mild regurgitation with PASP of 25mmHg  Pulmonic valve - trivial regurgitation   Biatrial enlargement  Pacemaker / ICD lead is visualized in the right heart. 6/2019 Dr Sly Holbrook  NSTEMI: . Angiogram findings were as below:      Findings:                      LM       Normal              LAD     50% ostial, mid 100%              Cx        40% OM1 at bifurcation              RCA     Mid 100%              LVG     Not performed              EDP     15 mmHg              L-LAD  Patent              S-PDA Known occluded  Severe Ca++:None  Post Cath Dx:Stable CAD, no apparent culprit for NSTEMI  Intervention:  None  Med Rec:        Continue aggressive med tx                          Continue plavix, no asa due to allergy. statin added. LDL 75   8/7/18  The PCI of complex proximal RCA chronic total occlusion was unsuccessful (J- score 3). Underwent successful Angioplasty of high-grade proximal RCA lesion proximal to the . RECOMMENDATIONS:  Attempt on this complex long  was unsuccessful. Lack   Of retrograde collaterals along with a very ambiguous cap as well as a large RV marginal branch and bridging collateral coming off at the cap makes it a  challenging repeat attempt. If she continues to have angina, it is recommended to proceed with the single-vessel SVG to the RCA. Echo: 2/17/16 ([facility])  Conclusions: Normal LV size and systolic function Mild to moderate mitral  regurgitation Mild tricuspid regurgitation  Findings:   Left Atrium: Mild to moderate enlargement of the left atrium. Left Ventricle: Upper limits of normal left ventricle size. No left ventricular  hypertrophy. Normal left ventricular systolic function.  The ejection fraction   Is visually estimated to be 55 %. Right Atrium: Normal right atrial size. RightVentricle: Normal right ventricle size. Normal right ventricular function. Aortic Valve: Tri-leaflet aortic valve. Mild aortic cusp calcification. No  aortic valve stenosis. Mild (1+) aortic valve regurgitation. Aorta: No dilatation of the aortic root. IVC/PA: Normal IVC size and normal respiratory  collapse consistent with normal right atrial pressure. Mitral Valve: Mild mitral valve leaflet calcification. Mild to moderate (2+) mitral valve  regurgitation. No mitral valve stenosis. Tricuspid Valve: Mild (1+) tricuspid  regurgitation. The pulmonary artery pressure is normal.   Pulmonic Valve: Mild  pulmonic regurgitation. Pericardium: Normal pericardium with no significant  pericardial effusion. Assessment:    1. Chronic systolic heart failure (HCC) with improved LVEF on bb; no aldosterone antagonist due to renal insuff   2. Chronic atrial fibrillation s/p watchman Dr Mayte Mcarthur   3. Coronary artery disease involving autologous artery coronary bypass graft without angina pectoris    4.       Renal insufficiency follows with Dr Lulu Cronin:   Blood work in 2 months  Continue all current medications  Refilled nitro  RTO in 4 months    I appreciate the opportunity of cooperating in the care of this individual.    TIFFANIE Saldaña - CNS, CNS, 1/17/2023, 2:04 PM

## 2023-01-25 NOTE — DISCHARGE INSTRUCTIONS
DONA DISCHARGE INSTRUCTIONS    No driving for 24 hours. We strongly recommend that a responsible adult stay with you for the next 24 hours. Continue Medications.     Advance diet as tolerated    Contact physician office  for following symptoms:  Fever  Difficulty swallowing  Chest pain  Difficulty breathing  Bleeding INR not at goal. Medications, chart, and patient findings reviewed. See calendar for adjustments to dose and follow up plan.

## 2023-01-30 ENCOUNTER — OFFICE VISIT (OUTPATIENT)
Dept: FAMILY MEDICINE CLINIC | Age: 83
End: 2023-01-30
Payer: MEDICARE

## 2023-01-30 ENCOUNTER — TELEPHONE (OUTPATIENT)
Dept: FAMILY MEDICINE CLINIC | Age: 83
End: 2023-01-30

## 2023-01-30 VITALS
WEIGHT: 116 LBS | HEART RATE: 90 BPM | TEMPERATURE: 97.3 F | OXYGEN SATURATION: 99 % | BODY MASS INDEX: 20.55 KG/M2 | SYSTOLIC BLOOD PRESSURE: 100 MMHG | DIASTOLIC BLOOD PRESSURE: 52 MMHG

## 2023-01-30 DIAGNOSIS — J01.90 ACUTE BACTERIAL SINUSITIS: Primary | ICD-10-CM

## 2023-01-30 DIAGNOSIS — B96.89 ACUTE BACTERIAL SINUSITIS: Primary | ICD-10-CM

## 2023-01-30 DIAGNOSIS — N18.32 STAGE 3B CHRONIC KIDNEY DISEASE (HCC): ICD-10-CM

## 2023-01-30 DIAGNOSIS — K29.00 ACUTE GASTRITIS, PRESENCE OF BLEEDING UNSPECIFIED, UNSPECIFIED GASTRITIS TYPE: ICD-10-CM

## 2023-01-30 DIAGNOSIS — S46.811A STRAIN OF RIGHT SUBSCAPULARIS MUSCLE, INITIAL ENCOUNTER: ICD-10-CM

## 2023-01-30 DIAGNOSIS — H90.3 BILATERAL SENSORINEURAL HEARING LOSS: ICD-10-CM

## 2023-01-30 DIAGNOSIS — R41.3 MEMORY LOSS DUE TO MEDICAL CONDITION: ICD-10-CM

## 2023-01-30 PROCEDURE — G8427 DOCREV CUR MEDS BY ELIG CLIN: HCPCS | Performed by: FAMILY MEDICINE

## 2023-01-30 PROCEDURE — G8484 FLU IMMUNIZE NO ADMIN: HCPCS | Performed by: FAMILY MEDICINE

## 2023-01-30 PROCEDURE — 3288F FALL RISK ASSESSMENT DOCD: CPT | Performed by: FAMILY MEDICINE

## 2023-01-30 PROCEDURE — G8420 CALC BMI NORM PARAMETERS: HCPCS | Performed by: FAMILY MEDICINE

## 2023-01-30 PROCEDURE — 3078F DIAST BP <80 MM HG: CPT | Performed by: FAMILY MEDICINE

## 2023-01-30 PROCEDURE — 1036F TOBACCO NON-USER: CPT | Performed by: FAMILY MEDICINE

## 2023-01-30 PROCEDURE — 3074F SYST BP LT 130 MM HG: CPT | Performed by: FAMILY MEDICINE

## 2023-01-30 PROCEDURE — 99214 OFFICE O/P EST MOD 30 MIN: CPT | Performed by: FAMILY MEDICINE

## 2023-01-30 PROCEDURE — G8399 PT W/DXA RESULTS DOCUMENT: HCPCS | Performed by: FAMILY MEDICINE

## 2023-01-30 PROCEDURE — 1090F PRES/ABSN URINE INCON ASSESS: CPT | Performed by: FAMILY MEDICINE

## 2023-01-30 PROCEDURE — 1123F ACP DISCUSS/DSCN MKR DOCD: CPT | Performed by: FAMILY MEDICINE

## 2023-01-30 RX ORDER — AZITHROMYCIN 500 MG/1
500 TABLET, FILM COATED ORAL DAILY
Qty: 5 TABLET | Refills: 0 | Status: SHIPPED | OUTPATIENT
Start: 2023-01-30 | End: 2023-02-04

## 2023-01-30 RX ORDER — FAMOTIDINE 40 MG/1
40 TABLET, FILM COATED ORAL 2 TIMES DAILY
Qty: 60 TABLET | Refills: 0 | Status: SHIPPED | OUTPATIENT
Start: 2023-01-30

## 2023-01-30 RX ORDER — PREDNISONE 10 MG/1
TABLET ORAL
Qty: 15 TABLET | Refills: 0 | Status: SHIPPED | OUTPATIENT
Start: 2023-01-30

## 2023-01-30 ASSESSMENT — PATIENT HEALTH QUESTIONNAIRE - PHQ9
SUM OF ALL RESPONSES TO PHQ9 QUESTIONS 1 & 2: 0
SUM OF ALL RESPONSES TO PHQ QUESTIONS 1-9: 0
2. FEELING DOWN, DEPRESSED OR HOPELESS: 0
1. LITTLE INTEREST OR PLEASURE IN DOING THINGS: 0
SUM OF ALL RESPONSES TO PHQ QUESTIONS 1-9: 0

## 2023-01-30 NOTE — PROGRESS NOTES
Subjective:      Patient ID: Chitra Hercules is a 80 y.o. female. CC: Patient presents for re-evaluation of chronic health problems including right shoulder blade pain, upper abdominal pain, ear evaluation and respiratory congestion. Ruth Larkin HPIPatient presents today for a follow-up on chronic medications and medical conditions in accompaniment of daughter. Patient states she was sleeping the other night and she felt some kind of bug go in her left ear. She felt a pain there after that. Patient has no hearing loss but is never had an audiology consultation. She has been having pain in her right shoulder blade area. Pain in the right shoulder blade has been going on now for some time she never mentioned it before. Her pain bothers her with certain maneuvers. She denies any respiratory difficulty associated with this. She has been having pain in her upper abdomen area. Patient feels she has had this upper abdominal discomfort ever since she had C. difficile colitis. Her bowels are back working normal for her at this point time. She also has a lot of nasal congestion and drainage. This is been going on now for over 2 weeks. Patient has had recent evaluation with cardiology and underwent an transesophageal echocardiogram with good report. No medication changes recently.     Review of Systems      Patient Active Problem List   Diagnosis    Essential hypertension, benign    Mixed hyperlipidemia    Chronic gouty arthropathy    Acquired hypothyroidism    Non-rheumatic mitral regurgitation    COPD (chronic obstructive pulmonary disease) (HCC)    Restrictive lung disease    Sick sinus syndrome (HCC)    Allergic rhinitis    Fibrocystic breast    Cerebrovascular accident (CVA) ([facility] [address]    HTN (hypertension), benign    Pacemaker    PAT (paroxysmal atrial tachycardia) ([facility])    Primary osteoarthritis involving multiple joints    Vitamin D deficiency    Tremor    Chronic total occlusion of native coronary artery    Age related osteoporosis    Chronic combined systolic (congestive) and diastolic (congestive) heart failure ([facility])    Stage 3 chronic kidney disease ([facility])    PAD (peripheral artery disease) ([facility])    Atherosclerosis of native arteries of extremities with intermittent claudication, bilateral legs ([facility])    Idiopathic peripheral neuropathy    Ischemic cardiomyopathy    Peripheral vertigo, bilateral    PAF (paroxysmal atrial fibrillation) ([facility])    Dyslipidemia    Iron deficiency anemia    Bilateral sensorineural hearing loss    Memory loss due to medical condition    Symptomatic bradycardia    Pacemaker lead failure    Presence of Watchman left atrial appendage closure device    Chronic right sacroiliac pain    Postcholecystectomy diarrhea    Chronic renal failure, stage 3b ([facility])    Coronary artery disease involving native heart without angina pectoris, unspecified vessel or lesion type    Hoarseness, persistent    Protein calorie malnutrition       Outpatient Medications Marked as Taking for the 1/30/23 encounter (Office Visit) with Umesh Guidry MD   Medication Sig Dispense Refill    nitroGLYCERIN (NITROLINGUAL) 0.4 MG/SPRAY 0.4 mg spray Place 1 spray under the tongue every 5 minutes as needed for Chest pain 4.9 g 1    HYDROcodone-acetaminophen (NORCO) 5-325 MG per tablet Take 1 tablet by mouth 4 times daily as needed for Pain for up to 30 days. 120 tablet 0    torsemide (DEMADEX) 20 MG tablet 10 mg every day except Sunday and as directed for weight gain 60 tablet 1    clopidogrel (PLAVIX) 75 MG tablet Take 1 tablet by mouth daily 90 tablet 2    carvedilol (COREG) 25 MG tablet Take 1 tablet by mouth 2 times daily (with meals) (Patient taking differently: Take 25 mg by mouth 2 times daily) 180 tablet 3    rosuvastatin (CRESTOR) 5 MG tablet Take 1 tablet by mouth nightly 90 tablet 1    levothyroxine (SYNTHROID) 125 MCG tablet Take 1 tablet by mouth Daily 90 tablet 0    aspirin EC 81 MG EC tablet Take 1 tablet by mouth  daily 90 tablet 1    topiramate (TOPAMAX) 50 MG tablet TAKE 2 TABLETS TWICE DAILY 360 tablet 0    vitamin D (ERGOCALCIFEROL) 1.25 MG (87200 UT) CAPS capsule TAKE 1 CAPSULE ONCE A WEEK 12 capsule 1    allopurinol (ZYLOPRIM) 100 MG tablet TAKE 1 TABLET EVERY DAY 90 tablet 1    fluticasone (FLONASE) 50 MCG/ACT nasal spray 2 sprays by Each Nostril route daily 16 g 0       Allergies   Allergen Reactions    Aspirin Nausea Only    Diltiazem Anaphylaxis    Diltiazem Hcl Anaphylaxis    Lorazepam Other (See Comments)     hallucinations  hallucinations  hallucinations    Sulfa Antibiotics Rash and Hives    Atorvastatin Other (See Comments)     Muscle pains  Muscle pains  Muscle pains    Dabigatran Nausea Only     And indigestion  And indigestion    Aka Pradaxa  And indigestion  And indigestion  And indigestion    Aka Pradaxa  And indigestion  And indigestion  And indigestion    Aka Pradaxa    Mysoline [Primidone]     Nsaids      Other reaction(s): Unknown    Other Other (See Comments)     Nitroglycerin patches causes severe headaches    Primidone      Other reaction(s): Unknown       Social History     Tobacco Use    Smoking status: Former     Packs/day: 1.00     Years: 28.00     Pack years: 28.00     Types: Cigarettes     Quit date: 1987     Years since quittin.1    Smokeless tobacco: Never    Tobacco comments:     H.O.smoking at age 15 / smoked up to 1 p.p.d / quit    Substance Use Topics    Alcohol use: Yes     Comment: social       BP (!) 100/52 (Site: Right Upper Arm, Position: Sitting, Cuff Size: Medium Adult)   Pulse 90   Temp 97.3 °F (36.3 °C) (Infrared)   Wt 116 lb (52.6 kg)   SpO2 99%   BMI 20.55 kg/m²       Objective:   Physical Exam  Constitutional:       General: She is not in acute distress. Appearance: Normal appearance. She is well-developed.    HENT:      Right Ear: Tympanic membrane and ear canal normal.      Left Ear: Tympanic membrane and ear canal normal.      Nose: Mucosal edema and rhinorrhea (purulent) present. Right Sinus: Frontal sinus tenderness present. No maxillary sinus tenderness. Left Sinus: Frontal sinus tenderness present. No maxillary sinus tenderness. Mouth/Throat:      Mouth: Mucous membranes are moist.      Pharynx: Oropharynx is clear. Pulmonary:      Effort: Pulmonary effort is normal.      Breath sounds: Normal breath sounds. Abdominal:      General: Bowel sounds are normal. There is no distension. Palpations: Abdomen is soft. Abdomen is not rigid. There is no hepatomegaly or mass. Tenderness: There is abdominal tenderness in the epigastric area. There is no right CVA tenderness, left CVA tenderness, guarding or rebound. Hernia: No hernia is present. Musculoskeletal:      Right shoulder: Crepitus present. No tenderness or bony tenderness. Normal range of motion. Left shoulder: Normal.      Thoracic back: No spasms or tenderness. Lymphadenopathy:      Cervical: No cervical adenopathy. Skin:     General: Skin is warm. Findings: No rash. Neurological:      Mental Status: She is alert. Mental status is at baseline. Psychiatric:         Behavior: Behavior is cooperative. Assessment:      Juju Whitley was seen today for 3 month follow-up. Diagnoses and all orders for this visit:    Acute bacterial sinusitis  -     azithromycin (ZITHROMAX) 500 MG tablet; Take 1 tablet by mouth daily for 5 days    Strain of right subscapularis muscle, initial encounter    Acute gastritis, presence of bleeding unspecified, unspecified gastritis type    Bilateral sensorineural hearing loss    Memory loss due to medical condition    Stage 3b chronic kidney disease ([facility] [address]    Other orders  -     famotidine (PEPCID) 40 MG tablet; Take 1 tablet by mouth 2 times daily  -     predniSONE (DELTASONE) 10 MG tablet; 1 TID for 3 day then 1 BID          Plan:      Reviewed labs and test results with patient.     Chronic renal failure is stable and maintain current treatment plan and no nephrotoxic medications and minimize salt in diet    Midepigastric pain consistent with gastritis and begin Pepcid for the next month. The right shoulder scapular strain will be treated with prednisone burst    Start antibiotic therapy for sinusitis and be cautious as a recent C. difficile colitis problem. No change to current chronic medications    Patient received counseling on the following healthy behaviors: nutrition and exercise     Patient given educational materials     Health maintenance updated    Discussed use, benefit, and side effects of prescribed medications. Barriers to medication compliance addressed. All patient questions answered. Pt voiced understanding. Patient needs RTC in 3 months. Medical decision making of moderate complexity. Please note that this chart was generated using Dragon dictation software. Although every effort was made to ensure the accuracy of this automated transcription, some errors in transcription may have occurred.

## 2023-01-30 NOTE — TELEPHONE ENCOUNTER
Patient's daughter calling to ask if it was okay for patient to take a muscle relaxer for her shoulder?    Please advise.

## 2023-02-07 ENCOUNTER — TELEPHONE (OUTPATIENT)
Dept: FAMILY MEDICINE CLINIC | Age: 83
End: 2023-02-07

## 2023-02-07 NOTE — TELEPHONE ENCOUNTER
Pt called to ask what kind of cough medicine she is able to take with her heart condition.    Please advise

## 2023-02-08 ENCOUNTER — APPOINTMENT (OUTPATIENT)
Dept: GENERAL RADIOLOGY | Age: 83
DRG: 194 | End: 2023-02-08
Payer: MEDICARE

## 2023-02-08 ENCOUNTER — HOSPITAL ENCOUNTER (EMERGENCY)
Age: 83
Discharge: HOME | DRG: 194 | End: 2023-02-09
Attending: EMERGENCY MEDICINE
Payer: MEDICARE

## 2023-02-08 DIAGNOSIS — I50.9 CONGESTIVE HEART FAILURE, UNSPECIFIED HF CHRONICITY, UNSPECIFIED HEART FAILURE TYPE (HCC): Primary | ICD-10-CM

## 2023-02-08 DIAGNOSIS — R06.2 WHEEZING: ICD-10-CM

## 2023-02-08 LAB
A/G RATIO: 1.3 (ref 1.1–2.2)
ALBUMIN SERPL-MCNC: 3.4 G/DL (ref 3.4–5)
ALP BLD-CCNC: 89 U/L (ref 40–129)
ALT SERPL-CCNC: 18 U/L (ref 10–40)
ANION GAP SERPL CALCULATED.3IONS-SCNC: 12 MMOL/L (ref 3–16)
ANISOCYTOSIS: ABNORMAL
AST SERPL-CCNC: 13 U/L (ref 15–37)
BASOPHILS ABSOLUTE: 0.1 K/UL (ref 0–0.2)
BASOPHILS RELATIVE PERCENT: 0.8 %
BILIRUB SERPL-MCNC: 0.3 MG/DL (ref 0–1)
BUN BLDV-MCNC: 39 MG/DL (ref 7–20)
BURR CELLS: ABNORMAL
CALCIUM SERPL-MCNC: 8.4 MG/DL (ref 8.3–10.6)
CHLORIDE BLD-SCNC: 109 MMOL/L (ref 99–110)
CO2: 18 MMOL/L (ref 21–32)
CREAT SERPL-MCNC: 1.6 MG/DL (ref 0.6–1.2)
EOSINOPHILS ABSOLUTE: 0.1 K/UL (ref 0–0.6)
EOSINOPHILS RELATIVE PERCENT: 1.2 %
GFR SERPL CREATININE-BSD FRML MDRD: 32 ML/MIN/{1.73_M2}
GLUCOSE BLD-MCNC: 127 MG/DL (ref 70–99)
HCT VFR BLD CALC: 40.2 % (ref 36–48)
HEMOGLOBIN: 11.8 G/DL (ref 12–16)
HYPOCHROMIA: ABNORMAL
LACTIC ACID, SEPSIS: 1.4 MMOL/L (ref 0.4–1.9)
LYMPHOCYTES ABSOLUTE: 0.4 K/UL (ref 1–5.1)
LYMPHOCYTES RELATIVE PERCENT: 5.4 %
MCH RBC QN AUTO: 22.4 PG (ref 26–34)
MCHC RBC AUTO-ENTMCNC: 29.5 G/DL (ref 31–36)
MCV RBC AUTO: 76 FL (ref 80–100)
MONOCYTES ABSOLUTE: 0.3 K/UL (ref 0–1.3)
MONOCYTES RELATIVE PERCENT: 4.3 %
NEUTROPHILS ABSOLUTE: 7.1 K/UL (ref 1.7–7.7)
NEUTROPHILS RELATIVE PERCENT: 88.3 %
OVALOCYTES: ABNORMAL
PDW BLD-RTO: 20.1 % (ref 12.4–15.4)
PLATELET # BLD: 247 K/UL (ref 135–450)
PLATELET SLIDE REVIEW: ADEQUATE
PMV BLD AUTO: 10 FL (ref 5–10.5)
POLYCHROMASIA: ABNORMAL
POTASSIUM SERPL-SCNC: 4.4 MMOL/L (ref 3.5–5.1)
PRO-BNP: ABNORMAL PG/ML (ref 0–449)
PROCALCITONIN: 0.11 NG/ML (ref 0–0.15)
RAPID INFLUENZA  B AGN: NEGATIVE
RAPID INFLUENZA A AGN: NEGATIVE
RBC # BLD: 5.29 M/UL (ref 4–5.2)
SARS-COV-2, NAAT: NOT DETECTED
SLIDE REVIEW: ABNORMAL
SODIUM BLD-SCNC: 139 MMOL/L (ref 136–145)
TEAR DROP CELLS: ABNORMAL
TOTAL PROTEIN: 6 G/DL (ref 6.4–8.2)
TROPONIN: 0.04 NG/ML
WBC # BLD: 8 K/UL (ref 4–11)

## 2023-02-08 PROCEDURE — 94640 AIRWAY INHALATION TREATMENT: CPT

## 2023-02-08 PROCEDURE — 71045 X-RAY EXAM CHEST 1 VIEW: CPT

## 2023-02-08 PROCEDURE — 6360000002 HC RX W HCPCS: Performed by: PHYSICIAN ASSISTANT

## 2023-02-08 PROCEDURE — 6370000000 HC RX 637 (ALT 250 FOR IP): Performed by: PHYSICIAN ASSISTANT

## 2023-02-08 PROCEDURE — 93005 ELECTROCARDIOGRAM TRACING: CPT | Performed by: EMERGENCY MEDICINE

## 2023-02-08 PROCEDURE — 83880 ASSAY OF NATRIURETIC PEPTIDE: CPT

## 2023-02-08 PROCEDURE — 87804 INFLUENZA ASSAY W/OPTIC: CPT

## 2023-02-08 PROCEDURE — 84145 PROCALCITONIN (PCT): CPT

## 2023-02-08 PROCEDURE — 99285 EMERGENCY DEPT VISIT HI MDM: CPT

## 2023-02-08 PROCEDURE — 87635 SARS-COV-2 COVID-19 AMP PRB: CPT

## 2023-02-08 PROCEDURE — 84484 ASSAY OF TROPONIN QUANT: CPT

## 2023-02-08 PROCEDURE — 85025 COMPLETE CBC W/AUTO DIFF WBC: CPT

## 2023-02-08 PROCEDURE — 83605 ASSAY OF LACTIC ACID: CPT

## 2023-02-08 PROCEDURE — 80053 COMPREHEN METABOLIC PANEL: CPT

## 2023-02-08 RX ORDER — IPRATROPIUM BROMIDE AND ALBUTEROL SULFATE 2.5; .5 MG/3ML; MG/3ML
1 SOLUTION RESPIRATORY (INHALATION) ONCE
Status: COMPLETED | OUTPATIENT
Start: 2023-02-08 | End: 2023-02-08

## 2023-02-08 RX ORDER — ALBUTEROL SULFATE 2.5 MG/3ML
2.5 SOLUTION RESPIRATORY (INHALATION) ONCE
Status: COMPLETED | OUTPATIENT
Start: 2023-02-08 | End: 2023-02-08

## 2023-02-08 RX ADMIN — ALBUTEROL SULFATE 2.5 MG: 2.5 SOLUTION RESPIRATORY (INHALATION) at 23:03

## 2023-02-08 RX ADMIN — IPRATROPIUM BROMIDE AND ALBUTEROL SULFATE 1 AMPULE: .5; 2.5 SOLUTION RESPIRATORY (INHALATION) at 23:03

## 2023-02-08 ASSESSMENT — LIFESTYLE VARIABLES: HOW MANY STANDARD DRINKS CONTAINING ALCOHOL DO YOU HAVE ON A TYPICAL DAY: PATIENT DOES NOT DRINK

## 2023-02-08 ASSESSMENT — PAIN - FUNCTIONAL ASSESSMENT: PAIN_FUNCTIONAL_ASSESSMENT: NONE - DENIES PAIN

## 2023-02-09 ENCOUNTER — HOSPITAL ENCOUNTER (OUTPATIENT)
Dept: ONCOLOGY | Age: 83
Setting detail: INFUSION SERIES
Discharge: HOME | DRG: 194 | End: 2023-02-09
Payer: MEDICARE

## 2023-02-09 ENCOUNTER — OFFICE VISIT (OUTPATIENT)
Dept: CARDIOLOGY CLINIC | Age: 83
End: 2023-02-09
Payer: MEDICARE

## 2023-02-09 VITALS
BODY MASS INDEX: 20.55 KG/M2 | HEART RATE: 80 BPM | HEIGHT: 63 IN | DIASTOLIC BLOOD PRESSURE: 56 MMHG | SYSTOLIC BLOOD PRESSURE: 130 MMHG | OXYGEN SATURATION: 96 % | WEIGHT: 116 LBS | TEMPERATURE: 98.5 F | RESPIRATION RATE: 19 BRPM

## 2023-02-09 VITALS
WEIGHT: 111.8 LBS | BODY MASS INDEX: 19.81 KG/M2 | SYSTOLIC BLOOD PRESSURE: 130 MMHG | DIASTOLIC BLOOD PRESSURE: 56 MMHG | OXYGEN SATURATION: 96 % | HEIGHT: 63 IN | HEART RATE: 80 BPM

## 2023-02-09 VITALS
RESPIRATION RATE: 18 BRPM | DIASTOLIC BLOOD PRESSURE: 57 MMHG | SYSTOLIC BLOOD PRESSURE: 136 MMHG | TEMPERATURE: 97.6 F | HEART RATE: 77 BPM

## 2023-02-09 DIAGNOSIS — I50.23 ACUTE ON CHRONIC SYSTOLIC HEART FAILURE (HCC): Primary | ICD-10-CM

## 2023-02-09 DIAGNOSIS — N28.9 RENAL INSUFFICIENCY: ICD-10-CM

## 2023-02-09 DIAGNOSIS — I25.83 CORONARY ARTERY DISEASE DUE TO LIPID RICH PLAQUE: ICD-10-CM

## 2023-02-09 DIAGNOSIS — I48.20 CHRONIC ATRIAL FIBRILLATION (HCC): ICD-10-CM

## 2023-02-09 DIAGNOSIS — I25.10 CORONARY ARTERY DISEASE DUE TO LIPID RICH PLAQUE: ICD-10-CM

## 2023-02-09 LAB
EKG ATRIAL RATE: 227 BPM
EKG DIAGNOSIS: NORMAL
EKG P AXIS: 91 DEGREES
EKG Q-T INTERVAL: 354 MS
EKG QRS DURATION: 104 MS
EKG QTC CALCULATION (BAZETT): 442 MS
EKG R AXIS: 20 DEGREES
EKG T AXIS: -89 DEGREES
EKG VENTRICULAR RATE: 94 BPM

## 2023-02-09 PROCEDURE — G8420 CALC BMI NORM PARAMETERS: HCPCS | Performed by: CLINICAL NURSE SPECIALIST

## 2023-02-09 PROCEDURE — 6360000002 HC RX W HCPCS: Performed by: CLINICAL NURSE SPECIALIST

## 2023-02-09 PROCEDURE — 1090F PRES/ABSN URINE INCON ASSESS: CPT | Performed by: CLINICAL NURSE SPECIALIST

## 2023-02-09 PROCEDURE — 93010 ELECTROCARDIOGRAM REPORT: CPT | Performed by: INTERNAL MEDICINE

## 2023-02-09 PROCEDURE — 1123F ACP DISCUSS/DSCN MKR DOCD: CPT | Performed by: CLINICAL NURSE SPECIALIST

## 2023-02-09 PROCEDURE — 99215 OFFICE O/P EST HI 40 MIN: CPT | Performed by: CLINICAL NURSE SPECIALIST

## 2023-02-09 PROCEDURE — 2580000003 HC RX 258: Performed by: CLINICAL NURSE SPECIALIST

## 2023-02-09 PROCEDURE — 3078F DIAST BP <80 MM HG: CPT | Performed by: CLINICAL NURSE SPECIALIST

## 2023-02-09 PROCEDURE — G8427 DOCREV CUR MEDS BY ELIG CLIN: HCPCS | Performed by: CLINICAL NURSE SPECIALIST

## 2023-02-09 PROCEDURE — G8484 FLU IMMUNIZE NO ADMIN: HCPCS | Performed by: CLINICAL NURSE SPECIALIST

## 2023-02-09 PROCEDURE — 1036F TOBACCO NON-USER: CPT | Performed by: CLINICAL NURSE SPECIALIST

## 2023-02-09 PROCEDURE — 96374 THER/PROPH/DIAG INJ IV PUSH: CPT

## 2023-02-09 PROCEDURE — 3075F SYST BP GE 130 - 139MM HG: CPT | Performed by: CLINICAL NURSE SPECIALIST

## 2023-02-09 PROCEDURE — 6360000002 HC RX W HCPCS: Performed by: PHYSICIAN ASSISTANT

## 2023-02-09 PROCEDURE — G8399 PT W/DXA RESULTS DOCUMENT: HCPCS | Performed by: CLINICAL NURSE SPECIALIST

## 2023-02-09 PROCEDURE — 99211 OFF/OP EST MAY X REQ PHY/QHP: CPT

## 2023-02-09 RX ORDER — ALBUTEROL SULFATE 90 UG/1
2 AEROSOL, METERED RESPIRATORY (INHALATION) EVERY 4 HOURS PRN
Qty: 18 G | Refills: 0 | Status: SHIPPED | OUTPATIENT
Start: 2023-02-09

## 2023-02-09 RX ORDER — FUROSEMIDE 10 MG/ML
120 INJECTION INTRAMUSCULAR; INTRAVENOUS ONCE
Status: COMPLETED | OUTPATIENT
Start: 2023-02-09 | End: 2023-02-09

## 2023-02-09 RX ORDER — FUROSEMIDE 10 MG/ML
20 INJECTION INTRAMUSCULAR; INTRAVENOUS ONCE
Status: DISCONTINUED | OUTPATIENT
Start: 2023-02-09 | End: 2023-02-09

## 2023-02-09 RX ORDER — FUROSEMIDE 10 MG/ML
40 INJECTION INTRAMUSCULAR; INTRAVENOUS ONCE
Status: COMPLETED | OUTPATIENT
Start: 2023-02-09 | End: 2023-02-09

## 2023-02-09 RX ORDER — SODIUM CHLORIDE 0.9 % (FLUSH) 0.9 %
5-40 SYRINGE (ML) INJECTION ONCE
Status: COMPLETED | OUTPATIENT
Start: 2023-02-09 | End: 2023-02-09

## 2023-02-09 RX ADMIN — FUROSEMIDE 120 MG: 10 INJECTION, SOLUTION INTRAMUSCULAR; INTRAVENOUS at 14:37

## 2023-02-09 RX ADMIN — Medication 10 ML: at 14:36

## 2023-02-09 RX ADMIN — FUROSEMIDE 40 MG: 10 INJECTION, SOLUTION INTRAMUSCULAR; INTRAVENOUS at 02:02

## 2023-02-09 ASSESSMENT — ENCOUNTER SYMPTOMS
RHINORRHEA: 0
ABDOMINAL PAIN: 0
COUGH: 1
DIARRHEA: 0
VOMITING: 0
NAUSEA: 0
SHORTNESS OF BREATH: 0

## 2023-02-09 NOTE — ED PROVIDER NOTES
[facility]        Pt Name: Wiley Maguire  MRN: [id number]  [place] 1940  Date of evaluation: 2/8/2023  Provider: Tera Up PA-C  PCP: Catalina Frye MD  Note Started: 2:13 AM EST 2/9/23       I have seen and evaluated this patient with my supervising physician No att. providers found. CHIEF COMPLAINT       Chief Complaint   Patient presents with    Shortness of Breath     Pt arrived from home via [facility] EMS w c/o SOB. Pt with CHF and was at [facility] yesterday for the same reason       HISTORY OF PRESENT ILLNESS: 1 or more Elements     History From: Patient  Limitations to history : None    Wiley Maguire is a 80 y.o. female who presents to the emergency department today for evaluation for a cough. The patient states that she has had a cough, nasal congestion, she states that this is actually been ongoing for the past 2 to 3 days. The patient states that her daughter at home has similar symptoms. She states that her cough is dry, there is no sputum production or hemoptysis. She has no fever or chills. Patient denies any chest pain. When asked if the patient is short of breath she tells me that she is short of breath however she states that this is chronic for her as she states that she has had \"heart failure all my life\". The patient does not feel that she is overloaded with fluid. She has no lower leg swelling. Patient states that she is compliant with her medications. The patient denies any abdominal pain, she has no nausea, vomiting or diarrhea. She denies any urinary symptoms, no other complaints    Nursing Notes were all reviewed and agreed with or any disagreements were addressed in the HPI. REVIEW OF SYSTEMS :      Review of Systems   Constitutional:  Negative for activity change, appetite change, chills and fever. HENT:  Negative for congestion and rhinorrhea.     Respiratory:  Positive for cough. Negative for shortness of breath. Cardiovascular:  Negative for chest pain. Gastrointestinal:  Negative for abdominal pain, diarrhea, nausea and vomiting. Genitourinary:  Negative for difficulty urinating, dysuria and hematuria. Positives and Pertinent negatives as per HPI.      SURGICAL HISTORY     Past Surgical History:   Procedure Laterality Date    BACK SURGERY      CARDIAC CATHETERIZATION  7/2012    CARDIAC CATHETERIZATION  08/07/2018    Unsuccesful  of RCA    CARDIAC SURGERY      CABG & Cardiac ablation    CHOLECYSTECTOMY      CORONARY ARTERY BYPASS GRAFT  1987    LIMA- Diag/LAD, SVG- RCA    FEMORAL BYPASS Left 8/22/2019    LEFT FEMORAL TO POPLITEAL BYPASS GRAFT performed by Femi Arrieta MD at [facility] GRAFT N/A 8/22/2019    FEMORAL TO FEMORAL BYPASS performed by Femi Arrieta MD at [address] Left 03/16/2017    Left hip pinning    IR KYPHOPLASTY LUMBAR FIRST LEVEL  5/14/2021    IR KYPHOPLASTY LUMBAR FIRST LEVEL 5/14/2021 [facility] SPECIAL PROCEDURES    JOINT REPLACEMENT      [place] NJX AA&/STRD TFRML EPI LUMBAR/SACRAL 1 LEVEL Right 12/3/2018    RIGHT L3 AND L4 LUMBAR TRANSFORAMINAL EPIRUAL STEROID INJECTION WITH FLUOROSCOPY performed by Cuca Adan MD at [address] [facility]""    PTCA  11/04/2014    MARLENY - 3.0 x 28 to the [facility]      left       [address]       Discharge Medication List as of 2/9/2023  1:28 AM        CONTINUE these medications which have NOT CHANGED    Details   famotidine (PEPCID) 40 MG tablet Take 1 tablet by mouth 2 times daily, Disp-60 tablet, R-0Normal      predniSONE (DELTASONE) 10 MG tablet 1 TID for 3 day then 1 BID, Disp-15 tablet, R-0Normal      nitroGLYCERIN (NITROLINGUAL) 0.4 MG/SPRAY 0.4 mg spray Place 1 spray under the tongue every 5 minutes as needed for Chest pain, Disp-4.9 g, R-1Normal      HYDROcodone-acetaminophen (NORCO) 5-325 MG per tablet Take 1 tablet by mouth 4 times daily as needed for Pain for up to 30 days. , Disp-120 tablet, R-0Normal      torsemide (DEMADEX) 20 MG tablet 10 mg every day except  and as directed for weight gain, Disp-60 tablet, R-1Normal      clopidogrel (PLAVIX) 75 MG tablet Take 1 tablet by mouth daily, Disp-90 tablet, R-2Patient lost her prescription that she picked upNormal      carvedilol (COREG) 25 MG tablet Take 1 tablet by mouth 2 times daily (with meals), Disp-180 tablet, R-3Normal      rosuvastatin (CRESTOR) 5 MG tablet Take 1 tablet by mouth nightly, Disp-90 tablet, R-1Normal      levothyroxine (SYNTHROID) 125 MCG tablet Take 1 tablet by mouth Daily, Disp-90 tablet, R-0Normal      aspirin EC 81 MG EC tablet Take 1 tablet by mouth daily, Disp-90 tablet, R-1Normal      topiramate (TOPAMAX) 50 MG tablet TAKE 2 TABLETS TWICE DAILY, Disp-360 tablet, R-0Normal      vitamin D (ERGOCALCIFEROL) 1.25 MG (93043 UT) CAPS capsule TAKE 1 CAPSULE ONCE A WEEK, Disp-12 capsule, R-1Normal      allopurinol (ZYLOPRIM) 100 MG tablet TAKE 1 TABLET EVERY DAY, Disp-90 tablet, R-1Normal      fluticasone (FLONASE) 50 MCG/ACT nasal spray 2 sprays by Each Nostril route daily, Disp-16 g, R-0Normal             ALLERGIES     Aspirin, Diltiazem, Diltiazem hcl, Lorazepam, Sulfa antibiotics, Atorvastatin, Dabigatran, Mysoline [primidone], Nsaids, Other, and Primidone    FAMILYHISTORY       Family History   Problem Relation Age of Onset    Cancer Sister     Heart Disease Sister     Diabetes Brother     Hypertension Brother     Heart Disease Brother     Stroke Maternal Aunt     Heart Disease Maternal Aunt     Diabetes Maternal Uncle     Hypertension Paternal Aunt     Cancer Maternal Grandmother     Cancer Paternal Grandmother     Rheum Arthritis Neg Hx     Lupus Neg Hx         SOCIAL HISTORY       Social History     Tobacco Use    Smoking status: Former     Packs/day: 1.00     Years: 28.00     Pack years: 28.00     Types: Cigarettes     Quit date: 1987     Years since quittin.1 Smokeless tobacco: Never    Tobacco comments:     H.O.smoking at age 15 / smoked up to 1 p.p.d / quit 1987   Vaping Use    Vaping Use: Never used   Substance Use Topics    Alcohol use: Not Currently     Comment: social    Drug use: No       SCREENINGS        Ronit Coma Scale  Eye Opening: Spontaneous  Best Verbal Response: Oriented  Best Motor Response: Obeys commands  [place] Coma Scale Score: 15                CIWA Assessment  BP: (!) 130/56  Heart Rate: 80           PHYSICAL EXAM  1 or more Elements     ED Triage Vitals [02/08/23 2151]   BP Temp Temp Source Heart Rate Resp SpO2 Height Weight   (!) 149/70 98.5 °F (36.9 °C) Oral 95 18 97 % 5' 3\" (1.6 m) 116 lb (52.6 kg)       Physical Exam  Vitals and nursing note reviewed. Constitutional:       Appearance: She is well-developed. She is not diaphoretic. HENT:      Head: Normocephalic and atraumatic. Right Ear: External ear normal.      Left Ear: External ear normal.      Nose: Nose normal.   Eyes:      General:         Right eye: No discharge. Left eye: No discharge. Neck:      Trachea: No tracheal deviation. Cardiovascular:      Rate and Rhythm: Normal rate. Rhythm irregular. Comments: No lower leg edema  Pulmonary:      Effort: Pulmonary effort is normal. No respiratory distress. Breath sounds: Wheezing present. Comments: Diminished throughout lung fields, with scattered wheezing noted  Abdominal:      General: Bowel sounds are normal. There is no distension. Palpations: Abdomen is soft. Tenderness: There is no abdominal tenderness. There is no guarding. Musculoskeletal:         General: Normal range of motion. Cervical back: Normal range of motion and neck supple. Skin:     General: Skin is warm and dry. Neurological:      Mental Status: She is alert and oriented to person, place, and time.    Psychiatric:         Behavior: Behavior normal.           DIAGNOSTIC RESULTS   LABS:    Labs Reviewed   CBC WITH AUTO DIFFERENTIAL - Abnormal; Notable for the following components:       Result Value    RBC 5.29 (*)     Hemoglobin 11.8 (*)     MCV 76.0 (*)     MCH 22.4 (*)     MCHC 29.5 (*)     RDW 20.1 (*)     Lymphocytes Absolute 0.4 (*)     Anisocytosis Occasional (*)     Polychromasia Occasional (*)     Hypochromia Occasional (*)     [place] Cells Occasional (*)     Ovalocytes 2+ (*)     Tear Drop Cells Occasional (*)     All other components within normal limits   COMPREHENSIVE METABOLIC PANEL - Abnormal; Notable for the following components:    CO2 18 (*)     Glucose 127 (*)     BUN 39 (*)     Creatinine 1.6 (*)     Est, Glom Filt Rate 32 (*)     Total Protein 6.0 (*)     AST 13 (*)     All other components within normal limits   TROPONIN - Abnormal; Notable for the following components:    Troponin 0.04 (*)     All other components within normal limits   BRAIN NATRIURETIC PEPTIDE - Abnormal; Notable for the following components:    Pro-BNP 28,003 (*)     All other components within normal limits   COVID-19, RAPID   RAPID INFLUENZA A/B ANTIGENS   PROCALCITONIN   LACTATE, SEPSIS       When ordered only abnormal lab results are displayed. All other labs were within normal range or not returned as of this dictation. EKG: When ordered, EKG's are interpreted by the Emergency Department Physician in the absence of a cardiologist.  Please see their note for interpretation of EKG. RADIOLOGY:   Non-plain film images such as CT, Ultrasound and MRI are read by the radiologist. Plain radiographic images are visualized and preliminarily interpreted by the ED Provider with the below findings:        Interpretation per the Radiologist below, if available at the time of this note:    XR CHEST PORTABLE   Preliminary Result   1. Mild interstitial pulmonary edema, without acute airspace consolidation. 2. Stable chronic cardiomegaly. XR CHEST PORTABLE    Result Date: 2/9/2023  1.  Mild interstitial pulmonary edema, without acute airspace consolidation. 2. Stable chronic cardiomegaly. XR CHEST PORTABLE    Result Date: 2/6/2023  Site: Maria Dioxn #: [id number] #: [id number]: [id number] #: [de-identified] #: [id number] #: [id number]: XR CHEST AP PORTABLEExam Date/Time: 02/06/2023 01:15 PMAdmitting Diagnosis: Chronic coughReason for Exam: Chronic cough Dictated by: Debbi Officer ANÍBAL: 02/06/2023 02:03 PMT: This document is confidential medical information. Unauthorized disclosure or use of this information is prohibited by law. If you are not the intended recipient of this document, please advise us by calling immediately [phone]. Impression/Conclusion below HISTORY:   Chronic cough cough COMPARISON: 4/22/2022 NOTE:  If there are questions about the content of this report, please contact [address] [place] [address] radiology by calling [phone] FINDINGS: LINES/DEVICES: Implanted left chest device. LUNGS/PLEURA:  No acute infiltrate. No pneumothorax. Stable interstitial prominence. HEART:  Upper limits size of the heart. MEDIASTINUM/KRYSTIAN:  Unremarkable BONES:  Osteopenia OTHER:  None  IMPRESSION: Stable nonacute chest. SIGNED BY: Kenya Wilhelm DO on 2/6/2023  2:00 PM   [facility] Imaging Report - Critical access hospital[facility],[address]Th Floor [phone] -  [facility] Call Center: [phone]         No results found.     PROCEDURES   Unless otherwise noted below, none     Procedures    CRITICAL CARE TIME (.cctime)       PAST MEDICAL HISTORY      has a past medical history of Allergic rhinitis, cause unspecified, Arthritis, Atrial fibrillation ([address] Bronchopneumonia, CAD (coronary artery disease), Cerebral artery occlusion with cerebral infarction ([address] Chronic gouty arthropathy, Chronic kidney disease, Congestive heart failure, unspecified, Degeneration of cervical intervertebral disc, Essential and other specified forms of tremor, Gout, HIGH CHOLESTEROL, History of CVA (cerebrovascular accident), blood clots, Hypertension, Influenza (12/23/2017), Leakage of Watchman left atrial appendage closure device, Mitral valve stenosis and aortic valve insufficiency, Movement disorder, Pacemaker, Peptic ulcer, unspecified site, unspecified as acute or chronic, without mention of hemorrhage, perforation, or obstruction, Thyroid disease, Unspecified disorder of kidney and ureter, and Unspecified hypothyroidism. EMERGENCY DEPARTMENT COURSE and DIFFERENTIAL DIAGNOSIS/MDM:   Vitals:    Vitals:    02/08/23 2303 02/08/23 2330 02/09/23 0000 02/09/23 0100   BP:  135/79 134/69 (!) 130/56   Pulse: 93 82 85 80   Resp: 18 22 17 19   Temp:       TempSrc:       SpO2:  96% 96% 96%   Weight:       Height:           Patient was given the following medications:  Medications   ipratropium-albuterol (DUONEB) nebulizer solution 1 ampule (1 ampule Inhalation Given 2/8/23 2303)   albuterol (PROVENTIL) nebulizer solution 2.5 mg (2.5 mg Nebulization Given 2/8/23 2303)   furosemide (LASIX) injection 40 mg (40 mg IntraVENous Given 2/9/23 0202)             Is this patient to be included in the SEP-1 Core Measure due to severe sepsis or septic shock? No   Exclusion criteria - the patient is NOT to be included for SEP-1 Core Measure due to:   Infection is not suspected    Chronic Conditions affecting care:  has a past medical history of Allergic rhinitis, cause unspecified, Arthritis, Atrial fibrillation (HCC), Bronchopneumonia, CAD (coronary artery disease), Cerebral artery occlusion with cerebral infarction [facility]), Chronic gouty arthropathy, Chronic kidney disease, Congestive heart failure, unspecified, Degeneration of cervical intervertebral disc, Essential and other specified forms of tremor, Gout, HIGH CHOLESTEROL, History of CVA (cerebrovascular accident), blood clots, Hypertension, Influenza (12/23/2017), Leakage of Watchman left atrial appendage closure device, Mitral valve stenosis and aortic valve insufficiency, Movement disorder, Pacemaker, Peptic ulcer, unspecified site, unspecified as acute or chronic, without mention of hemorrhage, perforation, or obstruction, Thyroid disease, Unspecified disorder of kidney and ureter, and Unspecified hypothyroidism. CONSULTS: (Who and What was discussed)  None      Social Determinants Significantly Affecting Health : None    Records Reviewed (External and Source) previous emergency room visit from [address] [place] [address] on 2/6/2023    CC/HPI Summary, DDx, ED Course, and Reassessment: Briefly, this is a 80-year-old female who presents to the emergency department today for evaluation for a cough. The patient states that she has actually been experiencing a cough, and this is of been ongoing for the past several days. Daughter at home with similar symptoms. Patient does have a history of CHF, and when asked if the patient is short of breath, she tells me that she does have a history of CHF and she has been short of breath \"all my life\". She denies any chest pain or shortness of breath at this time. On examination she is demonstration throughout all lung fields, with scattered wheezing noted. Patient has no documented history of COPD but does have a long previous smoking history. Disposition Considerations (tests considered but not done, Admit vs D/C, Shared Decision Making, Pt Expectation of Test or Tx.):    EKG will be documented by my attending, please see his note for further details    CBC shows no evidence of leukocytosis, stable anemia. CMP shows chronic kidney disease, otherwise is unremarkable. Troponin is elevated 0.04, however this is chronic for the patient. BNP is elevated, and x-ray does show some pulmonary congestion    Procalcitonin and lactic acid are normal.    Shared medical decision making with the patient, the patient states that she would prefer to go home. Although her BNP is elevated, she and she does have some mild congestion, this is chronic for the patient.   She has been able to ambulate she is not tachycardic, tachypneic, or hypoxic. The patient does prefer to go home and I do feel that this is reasonable. We will give a dose of Lasix here, and will refer to CHF clinic in the morning. COVID and flu are negative, strict return precautions given. To consider admission, further imaging, and lab work however otherwise would not  as patient does prefer to go home. Patient otherwise stable for discharge.    Results for orders placed or performed during the hospital encounter of 02/08/23   COVID-19, Rapid    Specimen: Nasopharyngeal Swab   Result Value Ref Range    SARS-CoV-2, NAAT Not Detected Not Detected   Rapid influenza A/B antigens    Specimen: Nasopharyngeal   Result Value Ref Range    Rapid Influenza A Ag Negative Negative    Rapid Influenza B Ag Negative Negative   CBC with Auto Differential   Result Value Ref Range    WBC 8.0 4.0 - 11.0 K/uL    RBC 5.29 (H) 4.00 - 5.20 M/uL    Hemoglobin 11.8 (L) 12.0 - 16.0 g/dL    Hematocrit 40.2 36.0 - 48.0 %    MCV 76.0 (L) 80.0 - 100.0 fL    MCH 22.4 (L) 26.0 - 34.0 pg    MCHC 29.5 (L) 31.0 - 36.0 g/dL    RDW 20.1 (H) 12.4 - 15.4 %    Platelets 218 993 - 342 K/uL    MPV 10.0 5.0 - 10.5 fL    PLATELET SLIDE REVIEW Adequate     SLIDE REVIEW see below     Neutrophils % 88.3 %    Lymphocytes % 5.4 %    Monocytes % 4.3 %    Eosinophils % 1.2 %    Basophils % 0.8 %    Neutrophils Absolute 7.1 1.7 - 7.7 K/uL    Lymphocytes Absolute 0.4 (L) 1.0 - 5.1 K/uL    Monocytes Absolute 0.3 0.0 - 1.3 K/uL    Eosinophils Absolute 0.1 0.0 - 0.6 K/uL    Basophils Absolute 0.1 0.0 - 0.2 K/uL    Anisocytosis Occasional (A)     Polychromasia Occasional (A)     Hypochromia Occasional (A)     Pierre Cells Occasional (A)     Ovalocytes 2+ (A)     Tear Drop Cells Occasional (A)    Comprehensive Metabolic Panel   Result Value Ref Range    Sodium 139 136 - 145 mmol/L    Potassium 4.4 3.5 - 5.1 mmol/L    Chloride 109 99 - 110 mmol/L    CO2 18 (L) 21 - 32 mmol/L    Anion Gap 12 3 - 16    Glucose 127 (H) 70 - 99 mg/dL    BUN 39 (H) 7 - 20 mg/dL    Creatinine 1.6 (H) 0.6 - 1.2 mg/dL    Est, Glom Filt Rate 32 (A) >60    Calcium 8.4 8.3 - 10.6 mg/dL    Total Protein 6.0 (L) 6.4 - 8.2 g/dL    Albumin 3.4 3.4 - 5.0 g/dL    Albumin/Globulin Ratio 1.3 1.1 - 2.2    Total Bilirubin 0.3 0.0 - 1.0 mg/dL    Alkaline Phosphatase 89 40 - 129 U/L    ALT 18 10 - 40 U/L    AST 13 (L) 15 - 37 U/L   Troponin   Result Value Ref Range    Troponin 0.04 (H) <0.01 ng/mL   Brain Natriuretic Peptide   Result Value Ref Range    Pro-BNP 28,003 (H) 0 - 449 pg/mL   Procalcitonin   Result Value Ref Range    Procalcitonin 0.11 0.00 - 0.15 ng/mL   Lactate, Sepsis   Result Value Ref Range    Lactic Acid, Sepsis 1.4 0.4 - 1.9 mmol/L   EKG 12 Lead   Result Value Ref Range    Ventricular Rate 94 BPM    Atrial Rate 227 BPM    QRS Duration 104 ms    Q-T Interval 354 ms    QTc Calculation (Bazett) 442 ms    P Axis 91 degrees    R Axis 20 degrees    T Axis -89 degrees    Diagnosis       Atrial flutter with variable A-V blockLeft ventricular hypertrophy with repolarization abnormalityCannot rule out Septal infarct , age undetermined       I estimate there is LOW risk for PE, ACS, pneumonia, pleural effusion, pneumothorax, myocarditis, pericarditis, cardiac tamponade, life-threatening arrhythmia, respiratory distress, acute dissectionthus I consider the discharge disposition reasonable. Maycol Jung and I have discussed the diagnosis and risks, and we agree with discharging home to follow-up with their primary doctor. We also discussed returning to the Emergency Department immediately if new or worsening symptoms occur. We have discussed the symptoms which are most concerning (e.g., bloody sputum, fever, worsening pain or shortness of breath, vomiting) that necessitate immediate return. I am the Primary Clinician of Record. FINAL IMPRESSION      1.  Congestive heart failure, unspecified HF chronicity, unspecified heart failure type ([facility]    2.  Wheezing          DISPOSITION/PLAN     DISPOSITION Decision To Discharge 02/09/2023 01:25:58 AM      PATIENT REFERRED TO:  Chase Mazariegos [address] [place] [address]  [phone]  Schedule an appointment as soon as possible for a visit in 1 day      [facility] Emergency Department  [address] [facility]  [phone]    As needed, If symptoms worsen      DISCHARGE MEDICATIONS:  Discharge Medication List as of 2/9/2023  1:28 AM        START taking these medications    Details   albuterol sulfate HFA (PROVENTIL HFA) 108 (90 Base) MCG/ACT inhaler Inhale 2 puffs into the lungs every 4 hours as needed for Wheezing or Shortness of Breath (Space out to every 6 hours as symptoms improve) Space out to every 6 hours as symptoms improve., Disp-18 g, R-0Normal             DISCONTINUED MEDICATIONS:  Discharge Medication List as of 2/9/2023  1:28 AM                 (Please note that portions of this note were completed with a voice recognition program.  Efforts were made to edit the dictations but occasionally words are mis-transcribed.)    Shahriar Negro PA-C (electronically signed)        Shahriar Negro PA-C  02/09/23 0338

## 2023-02-09 NOTE — PROGRESS NOTES
[address]  Progress Note    Primary Care Doctor:  Mallika Sandoval MD    Chief Complaint   Patient presents with    Congestive Heart Failure    Shortness of Breath        History of Present Illness:  80 y.o. female with history of Ms. Derrek Fuentes She has a history of atrial fibrillation (on xarelto), TIA, chronic kidney disease, CHF, hypertension, hyperlipidemia, mitral valve disease, hypothyroidism. Her last LVEF was 45% on 7/19/19. 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 Plan is for a PCI in about 30 days  10/19-21/2022 for LHC with PCI to LM and cx, she was given 40 mg IV lasix for bnp of 14K (baseline is 3-4000) and BP extremely elevated, started on coreg. She had complex tachycardia vs AT and has a MCOT monitor. Watchman procedure      I had the pleasure of seeing Ovidio Salamanca in follow up for systolic heart failure with improved LVEF. She is ambulatory and with her daughter. She has been in to see her PCP who gave her prednisone and azithromycin along with being in the ER last night (received a breathing treatment). Her cxr showed pulmonary edema and bnp 28K. She is shortness of breath, wheezing and with rales in bases. No covid and flu. She has been feeling bad since last Friday. She did miss 2 morning medications as her daughter was sick as well.     Past Medical History:   has a past medical history of Allergic rhinitis, cause unspecified, Arthritis, Atrial fibrillation ([facility] [address] Bronchopneumonia, CAD (coronary artery disease), Cerebral artery occlusion with cerebral infarction [facility]), Chronic gouty arthropathy, Chronic kidney disease, Congestive heart failure, unspecified, Degeneration of cervical intervertebral disc, Essential and other specified forms of tremor, Gout, HIGH CHOLESTEROL, History of CVA (cerebrovascular accident), Hx of blood clots, Hypertension, Influenza, Leakage of Watchman left atrial appendage closure device, Mitral valve stenosis and aortic valve insufficiency, Movement disorder, Pacemaker, Peptic ulcer, unspecified site, unspecified as acute or chronic, without mention of hemorrhage, perforation, or obstruction, Thyroid disease, Unspecified disorder of kidney and ureter, and Unspecified hypothyroidism. Surgical History:   has a past surgical history that includes back surgery; Cholecystectomy; Cardiac surgery; Coronary artery bypass graft (1987); shoulder surgery; Cardiac catheterization (7/2012); hip surgery (Left, 03/16/2017); joint replacement; Cardiac catheterization (08/07/2018); Percutaneous Transluminal Coronary Angio (11/04/2014); pr njx aa&/strd tfrml epi lumbar/sacral 1 level (Right, 12/3/2018); Femoral-femoral Bypass Graft (N/A, 8/22/2019); femoral bypass (Left, 8/22/2019); and IR KYPHOPLASTY LUMBAR 1 VERTEBRAL BODY (5/14/2021). Social History:   reports that she quit smoking about 36 years ago. Her smoking use included cigarettes. She has a 28.00 pack-year smoking history. She has never used smokeless tobacco. She reports that she does not currently use alcohol. She reports that she does not use drugs. Family History:   Family History   Problem Relation Age of Onset    Cancer Sister     Heart Disease Sister     Diabetes Brother     Hypertension Brother     Heart Disease Brother     Stroke Maternal Aunt     Heart Disease Maternal Aunt     Diabetes Maternal Uncle     Hypertension Paternal Aunt     Cancer Maternal Grandmother     Cancer Paternal Grandmother     Rheum Arthritis Neg Hx     Lupus Neg Hx        Home Medications:  Prior to Admission medications    Medication Sig Start Date End Date Taking?  Authorizing Provider   albuterol sulfate HFA (PROVENTIL HFA) 108 (90 Base) MCG/ACT inhaler Inhale 2 puffs into the lungs every 4 hours as needed for Wheezing or Shortness of Breath (Space out to every 6 hours as symptoms improve) Space out to every 6 hours as symptoms improve. 2/9/23  Yes Al Bhagat PA-C   predniSONE (DELTASONE) 10 MG tablet 1 TID for 3 day then 1 BID 1/30/23  Yes Nimesh Iglesias MD   nitroGLYCERIN (NITROLINGUAL) 0.4 MG/SPRAY 0.4 mg spray Place 1 spray under the tongue every 5 minutes as needed for Chest pain 1/17/23  Yes TIFFANIE Rand   HYDROcodone-acetaminophen (NORCO) 5-325 MG per tablet Take 1 tablet by mouth 4 times daily as needed for Pain for up to 30 days.  1/10/23 2/9/23 Yes Nimesh Iglesias MD   torsemide (DEMADEX) 20 MG tablet 10 mg every day except Sunday and as directed for weight gain 11/29/22  Yes TIFFANIE Rand   clopidogrel (PLAVIX) 75 MG tablet Take 1 tablet by mouth daily 11/25/22  Yes Eileen Ramos MD   carvedilol (COREG) 25 MG tablet Take 1 tablet by mouth 2 times daily (with meals)  Patient taking differently: Take 25 mg by mouth 2 times daily 11/7/22  Yes TIFFANIE Munoz CNP   rosuvastatin (CRESTOR) 5 MG tablet Take 1 tablet by mouth nightly 11/7/22  Yes TIFFANIE Munoz CNP   levothyroxine (SYNTHROID) 125 MCG tablet Take 1 tablet by mouth Daily 11/7/22  Yes TIFFANIE Munoz CNP   aspirin EC 81 MG EC tablet Take 1 tablet by mouth daily 10/17/22  Yes Kingston Payan MD   topiramate (TOPAMAX) 50 MG tablet TAKE 2 TABLETS TWICE DAILY 10/10/22  Yes Nimesh Iglesias MD   vitamin D (ERGOCALCIFEROL) 1.25 MG (13047 UT) CAPS capsule TAKE 1 CAPSULE ONCE A WEEK 8/3/22  Yes Nimesh Iglesias MD   allopurinol (ZYLOPRIM) 100 MG tablet TAKE 1 TABLET EVERY DAY 7/13/22  Yes Nimesh Iglesias MD   fluticasone (FLONASE) 50 MCG/ACT nasal spray 2 sprays by Each Nostril route daily 1/25/22  Yes TIFFANIE Neville CNP   famotidine (PEPCID) 40 MG tablet Take 1 tablet by mouth 2 times daily  Patient not taking: Reported on 2/9/2023 1/30/23   Thelma Slaughter MD        Allergies:  Aspirin, Diltiazem, Diltiazem hcl, Lorazepam, Sulfa antibiotics, Atorvastatin, Dabigatran, Mysoline [primidone], Nsaids, Other, and Primidone     Review of Systems:   Constitutional: there has been no unanticipated weight loss. There's been no change in energy level, sleep pattern, or activity level. Eyes: No visual changes or diplopia. No scleral icterus. ENT: No Headaches, hearing loss or vertigo. No mouth sores or sore throat. Cardiovascular: Reviewed in HPI  Respiratory: No cough or wheezing, no sputum production. No hematemesis. Gastrointestinal: No abdominal pain, appetite loss, blood in stools. No change in bowel or bladder habits. States she feels bloated   Genitourinary: No dysuria, trouble voiding, or hematuria. Musculoskeletal:  No gait disturbance, weakness or joint complaints. Integumentary: No rash or pruritis. Neurological: No headache, diplopia, change in muscle strength, numbness or tingling. No change in gait, balance, coordination, mood, affect, memory, mentation, behavior. Psychiatric: No anxiety, no depression. Endocrine: No malaise, fatigue or temperature intolerance. No excessive thirst, fluid intake, or urination. No tremor. Hematologic/Lymphatic: No abnormal bruising or bleeding, blood clots or swollen lymph nodes. Allergic/Immunologic: No nasal congestion or hives. Physical Examination:    Vitals:    02/09/23 1402   BP: (!) 130/56   Pulse: 80   SpO2: 96%   Weight: 111 lb 12.8 oz (50.7 kg)   Height: 5' 3\" (1.6 m)            Constitutional and General Appearance: Warm and dry, no apparent distress, normal coloration  HEENT:  Normocephalic, atraumatic  Respiratory:  Normal excursion and expansion without use of accessory muscles  Resp Auscultation: rales and wheezing bilaterally  Cardiovascular:   The apical impulses not displaced  Heart tones are crisp and normal  +JVP  Regular rhythm   Peripheral pulses are symmetrical and full  There is no clubbing, cyanosis of the extremities.   No ankle edema  Pedal Pulses: 2+ and equal   Abdomen:  No masses or tenderness  Liver/Spleen: No Abnormalities Noted  Neurological/Psychiatric:  Alert and oriented in all spheres  Moves all extremities well  Exhibits normal gait balance and coordination  No abnormalities of mood, affect, memory, mentation, or behavior are noted    Lab Data:    CBC:   Lab Results   Component Value Date/Time    WBC 8.0 02/08/2023 10:37 PM    WBC 7.6 12/15/2022 02:01 PM    WBC 8.8 12/10/2022 01:01 PM    RBC 5.29 02/08/2023 10:37 PM    RBC 4.90 12/15/2022 02:01 PM    RBC 4.90 12/10/2022 01:01 PM    HGB 11.8 02/08/2023 10:37 PM    HGB 12.0 12/15/2022 02:01 PM    HGB 12.2 12/10/2022 01:01 PM    HCT 40.2 02/08/2023 10:37 PM    HCT 40.2 12/15/2022 02:01 PM    HCT 39.9 12/10/2022 01:01 PM    MCV 76.0 02/08/2023 10:37 PM    MCV 82.0 12/15/2022 02:01 PM    MCV 81.4 12/10/2022 01:01 PM    RDW 20.1 02/08/2023 10:37 PM    RDW 19.9 12/15/2022 02:01 PM    RDW 19.8 12/10/2022 01:01 PM     02/08/2023 10:37 PM     12/15/2022 02:01 PM     12/10/2022 01:01 PM     BMP:  Lab Results   Component Value Date/Time     02/08/2023 10:37 PM     12/15/2022 02:01 PM     12/10/2022 01:01 PM    K 4.4 02/08/2023 10:37 PM    K 4.1 12/15/2022 02:01 PM    K 4.4 12/10/2022 01:01 PM    K 3.9 11/16/2022 04:34 AM    K 3.5 11/12/2022 04:33 AM    K 4.3 11/11/2022 06:55 PM     02/08/2023 10:37 PM     12/15/2022 02:01 PM     12/10/2022 01:01 PM    CO2 18 02/08/2023 10:37 PM    CO2 24 12/15/2022 02:01 PM    CO2 22 12/10/2022 01:01 PM    PHOS 3.6 12/15/2022 02:01 PM    PHOS 2.8 11/15/2022 05:19 AM    PHOS 3.2 11/14/2022 05:39 AM    BUN 39 02/08/2023 10:37 PM    BUN 37 12/15/2022 02:01 PM    BUN 33 12/10/2022 01:01 PM    CREATININE 1.6 02/08/2023 10:37 PM    CREATININE 1.4 12/15/2022 02:01 PM    CREATININE 1.2 12/10/2022 01:01 PM     BNP:   Lab Results   Component Value Date/Time    PROBNP 28,003 02/08/2023 10:37 PM    PROBNP 6,148 12/15/2022 02:01 PM    PROBNP 8,979 11/11/2022 06:55 PM     Cardiac Imaging:  Echo 1/12/2023  Summary  Normal left ventricle size, wall thickness, and systolic function with an estimated ejection fraction of 55-60%.  Left atrial size is dilated.  There is a Watchman device seated with a feliciano-device leak measured at 1 mm on the anterior side. No thrombus seen.  Moderate tricuspid regurgitation.    Echo 11/15/2022  Summary  Left ventricle is dilated with mild concentric left ventricular hypertrophy.  Overall, left ventricular systolic function is mildly depressed.  Ejection fraction is visually estimated to be 45-50%.  No obvious regional wall motion abnormalities are noted.  Grade III diastolic dysfunction with elevated LV filling pressures. E/e' = 31.0.  There is at least moderate mitral regurgitation.  Moderate tricuspid regurgitation. Estimated pulmonary artery systolic pressure is elevated at 44 mmHg assuming a right atrial  pressure of 15 mmHg. Mild pulmonary hypertension.  Biatrial enlargement.  The aortic valve appears sclerotic but opens well. Mild aortic regurgitation.    [facility]: 10/19/22 Dr Singletary  Findings  Artery Findings/Result   LM Ulcerated, 80% to mid, eccentric   LAD NA   Cx 80% prox   RI N/A   RCA N/A   LVEDP     LVG        Intervention(s)  Artery Location Intervention Result   LM Os-prox Xience 3.5X12 (PD with 4.0NC to 18atm) No residual   Cx prox Xience 3.5X18  No residual      Post Cath Dx:       CAD as above      Cardiac CTA: 9/15/22  FINDINGS:   Left atrium:       Watch man device is seen.  There is only a small amount of thrombus in the   watch man device..       The left atrial appendage is not thrombosed.  Contrast is seen in the left   atrial appendage, compatible with leakage..       On axial images 18.  There is a crescentic area of contrast flowing around   the watch man device superiorly in the left  atrial appendage. Venkatesh Postal However, no   definite fistulous tract is seen connecting this to the left atrium. Circumflex coronary artery abuts the inferior aspect of the watch man device. There is severe calcified and noncalcified plaque seen in the circumflex. A   definite fistulous tract between the circumflex and the left atrial appendage   is not clearly identified by CT. Venkatesh Postal Native right coronary artery and left anterior descending coronary artery   heavily calcified. Mediastinum: Heart is enlarged. Thyroid gland unremarkable. Streak artifact   is seen from pacer. Ascending aorta measures 3.5 cm. No intimal flap is seen. No pericardial   effusion. Small hiatal hernia seen. No central pulmonary embolus   identified. Small mediastinal and hilar nodes are noted. Right hilar [place]   conglomerate, measures 1.9 cm x 2.4 cm. .  Calcification or clip seen in the   region of mitral valve. Lungs/Pleura: Respiratory motion artifact limits evaluation of fine pulmonary   parenchymal change. Mild underlying emphysema is seen. De pendant opacity   is seen at the lung bases, likely atelectasis. Pleural calcifications seen   on the left. Right hilar nodes are seen, similar compared to conventional chest CT August 2020       Upper Abdomen: Low attenuation is seen liver, compatible with fatty   infiltration. Soft Tissues/Bones: No acute bone or soft tissue abnormality. Cath: 9/12/22 Dr Freddie Toledo  Findings  Artery Findings/Result   LM 30% ostial to proximal   LAD Patent prox to mid stent, 60% diffuse ostial to mid   Cx 80% prox, fistula noted from Cx proper v Cx branch to left atrium/left atrial appendage (not present on prior angiogram done before placement of Watchman device, suspect erosion), OM1 70% bifurcational, inferior branch of OM1 50% mid,    RI N/A   RCA Mid 100% with R-R collaterals.    L-LAD patent   LVEDP NA   LVG NA      RHC:  RA RV PA FANNIE WP TCO TCI [facility] RA% PA% 2 30/3 35/15 23 JULIOCESAR 4.5 3.0 4.7 3.1 70 67%      Intervention(s)  None     Post Cath Dx:       CAD as above  Consider PCI of Cx in future if Cx proper geographically  from [facility] device on CTC    DONA Dr Akira Laird 7/20/2022  Summary   Normal left ventricle size, wall thickness, and systolic function with an   estimated ejection fraction of 55-60%. Moderate mitral regurgitation is present. Left atrial size is dilated. There is a Watchman device seated with a feliciano-device leak measured at 3 mm   on the anterior side. No thrombus seen. Moderate tricuspid regurgitation. Systolic pulmonary artery pressure (SPAP) estimated at 41 mmHg (RA pressure   3 mmHg), consistent with mild pulmonary hypertension. Echo 1/12/2022  Summary   Normal left ventricle size, wall thickness, and systolic function with an   estimated ejection fraction of 55-60%. Mitral valve leaflets appear moderately thickened. The posterior leaflet   appears severely restricted. Moderate mitral regurgitation is present. Mitral annular calcification. Left atrial size is dilated. There is a Watchman device seated with a   feliciano-device leak measured at 2 mm on the anterior side. There is a small an echogenic mass on the surface of the device, consistent   with thrombus near the coumadin ridge. DONA 7/7/2021  Summary   Ejection fraction is visually estimated to be 45-50 %. Mild thickening of anterior, posterior leaflet of mitral valve. There is decreased leaflet motion and \"hockey stick\" appearance of the   mitral leaflets. posterior leaflet is immobile. Mean Gradient 4 mmHg. Moderate mitral regurgitation is present. There is no evidence of mass or thrombus in the left atrium or appendage. Watchman in place. No thrombus. <3 mm leak. The left atrium is dilated. A PFO is present. Aortic valve leaflets appear thickened. Tricuspid aortic valve. Mild aortic regurgitation is present.    Plaque is noted in the thoracic aorta. Moderate tricuspid regurgitation. PASP 51 mmHg    [facility] 3/19/2021 Dr Belinda Jarvis  Artery Findings/Result   LM Normal   LAD 50% ostial, 50% mid instent   Cx 50% mid at OM1 bifurcation, 30% OM1 mid at bifurcation   RI NA   % prox with multiple R-R collaterals   LVEDP 8   LVG NA due to renal failure      Intervention:         None     Post Cath Dx:       Stable CAD  Possible angina from  of RCA - poor candidate for  intervention with renal failure    Echo 4/30/2020   Left ventricular cavity size is normal. Normal left ventricular wall   thickness. Left ventricular function appears to be reduced with an estimated   ejection fraction of 45%. Diffuse hypokinesis. Echo 7/19/2019   Summary    Left ventricle - normal size, thickness, reduced function with EF of 45%  LV wall motion - diffuse hypokinesis. Mitral valve - thickened, annular calcification, moderate regurgitation  Aortic valve - thickened, calcified, mild regurgitation  Tricuspid valve - mild regurgitation with PASP of 25mmHg  Pulmonic valve - trivial regurgitation   Biatrial enlargement  Pacemaker / ICD lead is visualized in the right heart. 6/2019 Dr Belinda Jarvis  NSTEMI: . Angiogram findings were as below:      Findings:                      LM       Normal              LAD     50% ostial, mid 100%              Cx        40% OM1 at bifurcation              RCA     Mid 100%              LVG     Not performed              EDP     15 mmHg              L-LAD  Patent              S-PDA Known occluded  Severe Ca++:None  Post Cath Dx:Stable CAD, no apparent culprit for NSTEMI  Intervention:  None  Med Rec:        Continue aggressive med tx                          Continue plavix, no asa due to allergy. statin added. LDL 75   8/7/18  The PCI of complex proximal RCA chronic total occlusion was unsuccessful (J- score 3). Underwent successful Angioplasty of high-grade proximal RCA lesion proximal to the .   RECOMMENDATIONS:  Attempt on this complex long  was unsuccessful. Lack   Of retrograde collaterals along with a very ambiguous cap as well as a large RV marginal branch and bridging collateral coming off at the cap makes it a  challenging repeat attempt. If she continues to have angina, it is recommended to proceed with the single-vessel SVG to the RCA. Echo: 2/17/16 ([facility])  Conclusions: Normal LV size and systolic function Mild to moderate mitral  regurgitation Mild tricuspid regurgitation  Findings:   Left Atrium: Mild to moderate enlargement of the left atrium. Left Ventricle: Upper limits of normal left ventricle size. No left ventricular  hypertrophy. Normal left ventricular systolic function. The ejection fraction   Is visually estimated to be 55 %. Right Atrium: Normal right atrial size. RightVentricle: Normal right ventricle size. Normal right ventricular function. Aortic Valve: Tri-leaflet aortic valve. Mild aortic cusp calcification. No  aortic valve stenosis. Mild (1+) aortic valve regurgitation. Aorta: No dilatation of the aortic root. IVC/PA: Normal IVC size and normal respiratory  collapse consistent with normal right atrial pressure. Mitral Valve: Mild mitral valve leaflet calcification. Mild to moderate (2+) mitral valve  regurgitation. No mitral valve stenosis. Tricuspid Valve: Mild (1+) tricuspid  regurgitation. The pulmonary artery pressure is normal.   Pulmonic Valve: Mild  pulmonic regurgitation. Pericardium: Normal pericardium with no significant  pericardial effusion. Assessment:    1. Chronic systolic heart failure (HCC) with improved LVEF on bb; no aldosterone antagonist due to renal insuff   2. Chronic atrial fibrillation s/p watchman Dr Leanna Peterson   3. Coronary artery disease involving autologous artery coronary bypass graft without angina pectoris    4.       Renal insufficiency follows with Dr Heena Vela:   IV lasix in infusion center today 120 mg   Continue all other medications  I will call you tomorrow to see how you are doing  RTO in 1 month    I appreciate the opportunity of cooperating in the care of this individual.    TIFFANIE Shay - CNS, CNS, 2/9/2023, 3:10 PM

## 2023-02-09 NOTE — ED PROVIDER NOTES
In addition to the advanced practice provider, I personally saw Enoch Luciano and performed a substantive portion of the visit including all aspects of the medical decision making. Medical Decision Making  ***    I personally saw the patient and independently provided *** minutes of non-concurrent critical care out of the total shared critical care time provided. EKG  The Ekg interpreted by me in the absence of a cardiologist shows. atrial fibrillation with a rate of 94  Axis is   Normal  QTc is  normal  LVH   No specific ST-T wave changes appreciated. No evidence of acute ischemia. Atrial fibrillation is new in comparison to previous EKG from November 11, 2022. However, patient has a history of atrial fibrillation, so it is not new to her. SEP-1  Is this patient to be included in the SEP-1 Core Measure due to severe sepsis or septic shock? {[id number]}    Screenings     [place] Coma Scale  Eye Opening: Spontaneous  Best Verbal Response: Oriented  Best Motor Response: Obeys commands  [place] Coma Scale Score: 15             Patient Referrals: BRITTANY Mazariegos [address] [place] [address]  [phone]  Schedule an appointment as soon as possible for a visit in 1 day      [facility] Emergency Department  [address] [place] [address]  [phone]    As needed, If symptoms worsen      Discharge Medications:  Discharge Medication List as of 2/9/2023  1:28 AM        START taking these medications    Details   albuterol sulfate HFA (PROVENTIL HFA) 108 (90 Base) MCG/ACT inhaler Inhale 2 puffs into the lungs every 4 hours as needed for Wheezing or Shortness of Breath (Space out to every 6 hours as symptoms improve) Space out to every 6 hours as symptoms improve., Disp-18 g, R-0Normal             FINAL IMPRESSION  1. Congestive heart failure, unspecified HF chronicity, unspecified heart failure type ([address]    2.  Wheezing        Blood pressure (!) 130/56, pulse 80, temperature 98.5 °F (36.9 °C), temperature source Oral, resp. rate 19, height 5' 3\" (1.6 m), weight 116 lb (52.6 kg), SpO2 96 %, not currently breastfeeding. For further details of Kemi Omalley emergency department encounter, please see documentation by advanced practice provider, ***.

## 2023-02-09 NOTE — ED NOTES
Report received from [facility], this nurse to assume care     Elby Hodgkin, RN  02/09/23 2033

## 2023-02-09 NOTE — PATIENT INSTRUCTIONS
IV lasix in infusion center today  Continue all other medications  I will call you tomorrow to see how you are doing  RTO in 1 month

## 2023-02-09 NOTE — PROGRESS NOTES
Patient to Department for Lasix  mg. Here from cardiology office. AVS printed and reviewed Heike Lasix IVP over 6 minutes. Pt to restroom post injection. Pt discharged home with daughter.  To follow up heart [place] tomorrow

## 2023-02-10 ENCOUNTER — HOSPITAL ENCOUNTER (INPATIENT)
Age: 83
LOS: 5 days | Discharge: HOME | DRG: 194 | End: 2023-02-15
Attending: STUDENT IN AN ORGANIZED HEALTH CARE EDUCATION/TRAINING PROGRAM | Admitting: FAMILY MEDICINE
Payer: MEDICARE

## 2023-02-10 ENCOUNTER — APPOINTMENT (OUTPATIENT)
Dept: GENERAL RADIOLOGY | Age: 83
DRG: 194 | End: 2023-02-10
Payer: MEDICARE

## 2023-02-10 DIAGNOSIS — J18.9 PNEUMONIA DUE TO INFECTIOUS ORGANISM, UNSPECIFIED LATERALITY, UNSPECIFIED PART OF LUNG: ICD-10-CM

## 2023-02-10 DIAGNOSIS — R77.8 ELEVATED TROPONIN: ICD-10-CM

## 2023-02-10 DIAGNOSIS — R79.89 ELEVATED SERUM CREATININE: ICD-10-CM

## 2023-02-10 DIAGNOSIS — J81.0 ACUTE PULMONARY EDEMA (HCC): Primary | ICD-10-CM

## 2023-02-10 PROBLEM — R06.00 DYSPNEA: Status: ACTIVE | Noted: 2023-02-10

## 2023-02-10 LAB
ANION GAP SERPL CALCULATED.3IONS-SCNC: 10 MMOL/L (ref 3–16)
BACTERIA: NORMAL /HPF
BASOPHILS ABSOLUTE: 0 K/UL (ref 0–0.2)
BASOPHILS RELATIVE PERCENT: 0.6 %
BILIRUBIN URINE: NEGATIVE
BLOOD, URINE: NEGATIVE
BUN BLDV-MCNC: 48 MG/DL (ref 7–20)
CALCIUM SERPL-MCNC: 9.3 MG/DL (ref 8.3–10.6)
CHLORIDE BLD-SCNC: 106 MMOL/L (ref 99–110)
CLARITY: ABNORMAL
CO2: 26 MMOL/L (ref 21–32)
COLOR: YELLOW
CREAT SERPL-MCNC: 1.7 MG/DL (ref 0.6–1.2)
EOSINOPHILS ABSOLUTE: 0.1 K/UL (ref 0–0.6)
EOSINOPHILS RELATIVE PERCENT: 2.1 %
EPITHELIAL CELLS, UA: 1 /HPF (ref 0–5)
GFR SERPL CREATININE-BSD FRML MDRD: 30 ML/MIN/{1.73_M2}
GLUCOSE BLD-MCNC: 85 MG/DL (ref 70–99)
GLUCOSE URINE: NEGATIVE MG/DL
HCT VFR BLD CALC: 48 % (ref 36–48)
HEMOGLOBIN: 14.1 G/DL (ref 12–16)
HYALINE CASTS: 0 /LPF (ref 0–8)
KETONES, URINE: NEGATIVE MG/DL
LEUKOCYTE ESTERASE, URINE: NEGATIVE
LYMPHOCYTES ABSOLUTE: 0.8 K/UL (ref 1–5.1)
LYMPHOCYTES RELATIVE PERCENT: 12.2 %
MCH RBC QN AUTO: 22.5 PG (ref 26–34)
MCHC RBC AUTO-ENTMCNC: 29.3 G/DL (ref 31–36)
MCV RBC AUTO: 76.7 FL (ref 80–100)
MICROSCOPIC EXAMINATION: YES
MONOCYTES ABSOLUTE: 0.3 K/UL (ref 0–1.3)
MONOCYTES RELATIVE PERCENT: 4.9 %
NEUTROPHILS ABSOLUTE: 5.5 K/UL (ref 1.7–7.7)
NEUTROPHILS RELATIVE PERCENT: 80.2 %
NITRITE, URINE: NEGATIVE
PDW BLD-RTO: 21.3 % (ref 12.4–15.4)
PH UA: 5 (ref 5–8)
PLATELET # BLD: 273 K/UL (ref 135–450)
PLATELET SLIDE REVIEW: ADEQUATE
PMV BLD AUTO: 10.1 FL (ref 5–10.5)
POTASSIUM SERPL-SCNC: 3.5 MMOL/L (ref 3.5–5.1)
PRO-BNP: ABNORMAL PG/ML (ref 0–449)
PROTEIN UA: NEGATIVE MG/DL
RBC # BLD: 6.26 M/UL (ref 4–5.2)
RBC UA: 1 /HPF (ref 0–4)
REASON FOR REJECTION: NORMAL
REJECTED TEST: NORMAL
SLIDE REVIEW: ABNORMAL
SODIUM BLD-SCNC: 142 MMOL/L (ref 136–145)
SPECIFIC GRAVITY UA: 1.01 (ref 1–1.03)
TROPONIN: 0.07 NG/ML
URINE REFLEX TO CULTURE: ABNORMAL
URINE TYPE: ABNORMAL
UROBILINOGEN, URINE: 0.2 E.U./DL
WBC # BLD: 6.9 K/UL (ref 4–11)
WBC UA: 1 /HPF (ref 0–5)

## 2023-02-10 PROCEDURE — 85025 COMPLETE CBC W/AUTO DIFF WBC: CPT

## 2023-02-10 PROCEDURE — 99285 EMERGENCY DEPT VISIT HI MDM: CPT

## 2023-02-10 PROCEDURE — 93005 ELECTROCARDIOGRAM TRACING: CPT | Performed by: STUDENT IN AN ORGANIZED HEALTH CARE EDUCATION/TRAINING PROGRAM

## 2023-02-10 PROCEDURE — 6370000000 HC RX 637 (ALT 250 FOR IP): Performed by: FAMILY MEDICINE

## 2023-02-10 PROCEDURE — 83880 ASSAY OF NATRIURETIC PEPTIDE: CPT

## 2023-02-10 PROCEDURE — 6360000002 HC RX W HCPCS: Performed by: PHYSICIAN ASSISTANT

## 2023-02-10 PROCEDURE — 80048 BASIC METABOLIC PNL TOTAL CA: CPT

## 2023-02-10 PROCEDURE — 6360000002 HC RX W HCPCS: Performed by: FAMILY MEDICINE

## 2023-02-10 PROCEDURE — 81001 URINALYSIS AUTO W/SCOPE: CPT

## 2023-02-10 PROCEDURE — 2580000003 HC RX 258: Performed by: PHYSICIAN ASSISTANT

## 2023-02-10 PROCEDURE — 71045 X-RAY EXAM CHEST 1 VIEW: CPT

## 2023-02-10 PROCEDURE — 1200000000 HC SEMI PRIVATE

## 2023-02-10 PROCEDURE — 2580000003 HC RX 258: Performed by: FAMILY MEDICINE

## 2023-02-10 PROCEDURE — 84484 ASSAY OF TROPONIN QUANT: CPT

## 2023-02-10 PROCEDURE — 36415 COLL VENOUS BLD VENIPUNCTURE: CPT

## 2023-02-10 RX ORDER — NITROGLYCERIN 400 UG/1
1 SPRAY ORAL EVERY 5 MIN PRN
Status: DISCONTINUED | OUTPATIENT
Start: 2023-02-10 | End: 2023-02-15 | Stop reason: HOSPADM

## 2023-02-10 RX ORDER — SODIUM CHLORIDE 0.9 % (FLUSH) 0.9 %
5-40 SYRINGE (ML) INJECTION PRN
Status: DISCONTINUED | OUTPATIENT
Start: 2023-02-10 | End: 2023-02-15 | Stop reason: HOSPADM

## 2023-02-10 RX ORDER — TORSEMIDE 20 MG/1
10 TABLET ORAL DAILY
Status: DISCONTINUED | OUTPATIENT
Start: 2023-02-11 | End: 2023-02-15 | Stop reason: HOSPADM

## 2023-02-10 RX ORDER — CARVEDILOL 25 MG/1
25 TABLET ORAL 2 TIMES DAILY WITH MEALS
Status: DISCONTINUED | OUTPATIENT
Start: 2023-02-11 | End: 2023-02-15 | Stop reason: HOSPADM

## 2023-02-10 RX ORDER — TOPIRAMATE 100 MG/1
100 TABLET, FILM COATED ORAL 2 TIMES DAILY
Status: DISCONTINUED | OUTPATIENT
Start: 2023-02-10 | End: 2023-02-15 | Stop reason: HOSPADM

## 2023-02-10 RX ORDER — ONDANSETRON 2 MG/ML
4 INJECTION INTRAMUSCULAR; INTRAVENOUS EVERY 6 HOURS PRN
Status: DISCONTINUED | OUTPATIENT
Start: 2023-02-10 | End: 2023-02-15 | Stop reason: HOSPADM

## 2023-02-10 RX ORDER — HEPARIN SODIUM 5000 [USP'U]/ML
5000 INJECTION, SOLUTION INTRAVENOUS; SUBCUTANEOUS 2 TIMES DAILY
Status: DISCONTINUED | OUTPATIENT
Start: 2023-02-10 | End: 2023-02-15 | Stop reason: HOSPADM

## 2023-02-10 RX ORDER — ROSUVASTATIN CALCIUM 10 MG/1
5 TABLET, COATED ORAL NIGHTLY
Status: DISCONTINUED | OUTPATIENT
Start: 2023-02-10 | End: 2023-02-15 | Stop reason: HOSPADM

## 2023-02-10 RX ORDER — ALBUTEROL SULFATE 90 UG/1
2 AEROSOL, METERED RESPIRATORY (INHALATION) EVERY 4 HOURS PRN
Status: DISCONTINUED | OUTPATIENT
Start: 2023-02-10 | End: 2023-02-15 | Stop reason: HOSPADM

## 2023-02-10 RX ORDER — ACETAMINOPHEN 650 MG/1
650 SUPPOSITORY RECTAL EVERY 6 HOURS PRN
Status: DISCONTINUED | OUTPATIENT
Start: 2023-02-10 | End: 2023-02-15 | Stop reason: HOSPADM

## 2023-02-10 RX ORDER — CLOPIDOGREL BISULFATE 75 MG/1
75 TABLET ORAL DAILY
Status: DISCONTINUED | OUTPATIENT
Start: 2023-02-11 | End: 2023-02-15 | Stop reason: HOSPADM

## 2023-02-10 RX ORDER — ONDANSETRON 4 MG/1
4 TABLET, ORALLY DISINTEGRATING ORAL EVERY 8 HOURS PRN
Status: DISCONTINUED | OUTPATIENT
Start: 2023-02-10 | End: 2023-02-15 | Stop reason: HOSPADM

## 2023-02-10 RX ORDER — FAMOTIDINE 20 MG/1
40 TABLET, FILM COATED ORAL 2 TIMES DAILY
Status: DISCONTINUED | OUTPATIENT
Start: 2023-02-10 | End: 2023-02-15 | Stop reason: HOSPADM

## 2023-02-10 RX ORDER — POLYETHYLENE GLYCOL 3350 17 G/17G
17 POWDER, FOR SOLUTION ORAL DAILY PRN
Status: DISCONTINUED | OUTPATIENT
Start: 2023-02-10 | End: 2023-02-14

## 2023-02-10 RX ORDER — ACETAMINOPHEN 325 MG/1
650 TABLET ORAL EVERY 6 HOURS PRN
Status: DISCONTINUED | OUTPATIENT
Start: 2023-02-10 | End: 2023-02-15 | Stop reason: HOSPADM

## 2023-02-10 RX ORDER — SODIUM CHLORIDE 9 MG/ML
INJECTION, SOLUTION INTRAVENOUS PRN
Status: DISCONTINUED | OUTPATIENT
Start: 2023-02-10 | End: 2023-02-15 | Stop reason: HOSPADM

## 2023-02-10 RX ORDER — SODIUM CHLORIDE 0.9 % (FLUSH) 0.9 %
5-40 SYRINGE (ML) INJECTION EVERY 12 HOURS SCHEDULED
Status: DISCONTINUED | OUTPATIENT
Start: 2023-02-10 | End: 2023-02-15 | Stop reason: HOSPADM

## 2023-02-10 RX ORDER — AZITHROMYCIN 500 MG/1
500 INJECTION, POWDER, LYOPHILIZED, FOR SOLUTION INTRAVENOUS ONCE
Status: DISCONTINUED | OUTPATIENT
Start: 2023-02-10 | End: 2023-02-10 | Stop reason: RX

## 2023-02-10 RX ADMIN — CEFTRIAXONE SODIUM 1000 MG: 1 INJECTION, POWDER, FOR SOLUTION INTRAMUSCULAR; INTRAVENOUS at 20:08

## 2023-02-10 RX ADMIN — FAMOTIDINE 40 MG: 20 TABLET, FILM COATED ORAL at 21:49

## 2023-02-10 RX ADMIN — HEPARIN SODIUM 5000 UNITS: 5000 INJECTION INTRAVENOUS; SUBCUTANEOUS at 21:49

## 2023-02-10 RX ADMIN — AZITHROMYCIN MONOHYDRATE 500 MG: 500 INJECTION, POWDER, LYOPHILIZED, FOR SOLUTION INTRAVENOUS at 19:00

## 2023-02-10 RX ADMIN — TOPIRAMATE 100 MG: 100 TABLET, FILM COATED ORAL at 21:49

## 2023-02-10 RX ADMIN — SODIUM CHLORIDE, PRESERVATIVE FREE 10 ML: 5 INJECTION INTRAVENOUS at 21:52

## 2023-02-10 RX ADMIN — CEFTRIAXONE SODIUM 1000 MG: 1 INJECTION, POWDER, FOR SOLUTION INTRAMUSCULAR; INTRAVENOUS at 18:18

## 2023-02-10 RX ADMIN — ROSUVASTATIN 5 MG: 10 TABLET, FILM COATED ORAL at 21:49

## 2023-02-10 ASSESSMENT — PAIN DESCRIPTION - ORIENTATION: ORIENTATION: RIGHT

## 2023-02-10 ASSESSMENT — PAIN DESCRIPTION - LOCATION: LOCATION: ARM

## 2023-02-10 ASSESSMENT — PAIN - FUNCTIONAL ASSESSMENT: PAIN_FUNCTIONAL_ASSESSMENT: 0-10

## 2023-02-10 ASSESSMENT — PAIN SCALES - GENERAL
PAINLEVEL_OUTOF10: 6
PAINLEVEL_OUTOF10: 0
PAINLEVEL_OUTOF10: 0

## 2023-02-10 ASSESSMENT — LIFESTYLE VARIABLES: HOW OFTEN DO YOU HAVE A DRINK CONTAINING ALCOHOL: MONTHLY OR LESS

## 2023-02-10 NOTE — ED PROVIDER NOTES
In addition to the advanced practice provider, I personally saw Marquita Ojeda and performed a substantive portion of the visit including all aspects of the medical decision making. Medical Decision Making  Pt with weakness fatigue, shortness of breath and chest congestion. She endorses symptoms for about 1 week. Patient was in the emergency room 2 days ago, found to have mild pulmonary edema and treated with IV Lasix but deemed stable for discharge home. She also received an additional dose of Lasix at the [address] yesterday. She states she is feeling worse. Denies chest pain. On exam pt is resting comfortably  Cardiac RRR, no murmur  Lungs COVID, crackles at bases without accessory muscle use or supplemental O2 need. No significant calf edema noted. EKG  The Ekg interpreted by me in the absence of a cardiologist shows. Atrial fibrillation, ventricular rate of 90. Axis left. QTc appropriate. Inferior lateral T wave inversions noted, no significant change from prior 2-8-2023. SEP-1  Is this patient to be included in the SEP-1 Core Measure due to severe sepsis or septic shock?    No   Exclusion criteria - the patient is NOT to be included for SEP-1 Core Measure due to:  May have criteria for sepsis, but does not meet criteria for severe sepsis or septic shock    Screenings     Ronit Coma Scale  Eye Opening: Spontaneous  Best Verbal Response: Oriented  Best Motor Response: Obeys commands  [place] Coma Scale Score: 15        XR CHEST PORTABLE   Final Result      Opacities developing in the right upper lobe           Labs Reviewed   CBC WITH AUTO DIFFERENTIAL - Abnormal; Notable for the following components:       Result Value    RBC 6.26 (*)     MCV 76.7 (*)     MCH 22.5 (*)     MCHC 29.3 (*)     RDW 21.3 (*)     Lymphocytes Absolute 0.8 (*)     All other components within normal limits   BASIC METABOLIC PANEL - Abnormal; Notable for the following components:    BUN 48 (*) Creatinine 1.7 (*)     Est, Glom Filt Rate 30 (*)     All other components within normal limits   BRAIN NATRIURETIC PEPTIDE - Abnormal; Notable for the following components:    Pro-BNP 13,600 (*)     All other components within normal limits   TROPONIN - Abnormal; Notable for the following components:    Troponin 0.07 (*)     All other components within normal limits   SPECIMEN REJECTION    Narrative:     CALL  [place]  [facility] tel. [phone],  Rejected BMP BNPEP TROP/Called to: LAQUITA CAMERON, 02/10/2023 16:05, by Melody Sanchez   URINALYSIS WITH REFLEX TO CULTURE     Medications   cefTRIAXone (ROCEPHIN) 1,000 mg in sodium chloride 0.9 % 50 mL IVPB (mini-bag) (1,000 mg IntraVENous New Bag 2/10/23 2008)   azithromycin (ZITHROMAX) 500 mg in sodium chloride 0.9 % 250 mL (vial-mate) IVPB (0 mg IntraVENous Stopped 2/10/23 2002)     Patient with history of CHF presenting for evaluation of worsening shortness of breath and chest congestion. On exam is comfortable on room air but does appear clinically overloaded on pulmonary exam.  BNP is somewhat improved from prior, still significantly elevated. Chest x-ray showing possible opacities right upper lobe. EKG troponin nonischemic, low suspicion for ACS. Work was also showing worsening GIANA. Patient has already had high doses of IV Lasix in the last 48 hours and with GIANA I would recommend admission for control diuresis and nephrology consultation. We will hold off on diuresis in the emergency room as she is currently in no distress from a respiratory standpoint. We will also treat for community-acquired pneumonia with azithromycin and Rocephin. I do not believe she is septic and would avoid aggressive IV fluids due to concurrent pulmonary edema. She is agreeable to admission for the above. Patient Referrals:  No follow-up provider specified. Discharge Medications:  New Prescriptions    No medications on file       FINAL IMPRESSION  1. Acute pulmonary edema (HCC)    2. Elevated troponin    3. Elevated serum creatinine    4. Pneumonia due to infectious organism, unspecified laterality, unspecified part of lung        Blood pressure (!) 127/52, pulse 71, temperature 98.4 °F (36.9 °C), temperature source Oral, resp. rate 23, height 5' 3\" (1.6 m), weight 111 lb (50.3 kg), SpO2 99 %, not currently breastfeeding.     For further details of Staci Menezes's emergency department encounter, please see documentation by advanced practice provider      Pastora SANDOVAL MD  02/10/23 2055

## 2023-02-11 ENCOUNTER — APPOINTMENT (OUTPATIENT)
Dept: CT IMAGING | Age: 83
DRG: 194 | End: 2023-02-11
Payer: MEDICARE

## 2023-02-11 PROBLEM — J18.9 PNEUMONIA INVOLVING RIGHT LUNG: Status: ACTIVE | Noted: 2023-02-11

## 2023-02-11 LAB
ANION GAP SERPL CALCULATED.3IONS-SCNC: 12 MMOL/L (ref 3–16)
BUN BLDV-MCNC: 45 MG/DL (ref 7–20)
C-REACTIVE PROTEIN: 25.7 MG/L (ref 0–5.1)
CALCIUM SERPL-MCNC: 8.6 MG/DL (ref 8.3–10.6)
CHLORIDE BLD-SCNC: 108 MMOL/L (ref 99–110)
CO2: 24 MMOL/L (ref 21–32)
CREAT SERPL-MCNC: 1.6 MG/DL (ref 0.6–1.2)
EKG ATRIAL RATE: 107 BPM
EKG DIAGNOSIS: NORMAL
EKG Q-T INTERVAL: 384 MS
EKG QRS DURATION: 110 MS
EKG QTC CALCULATION (BAZETT): 469 MS
EKG R AXIS: -12 DEGREES
EKG T AXIS: -43 DEGREES
EKG VENTRICULAR RATE: 90 BPM
GFR SERPL CREATININE-BSD FRML MDRD: 32 ML/MIN/{1.73_M2}
GLUCOSE BLD-MCNC: 93 MG/DL (ref 70–99)
HCT VFR BLD CALC: 40.7 % (ref 36–48)
HEMOGLOBIN: 12.3 G/DL (ref 12–16)
MCH RBC QN AUTO: 22.7 PG (ref 26–34)
MCHC RBC AUTO-ENTMCNC: 30.3 G/DL (ref 31–36)
MCV RBC AUTO: 74.9 FL (ref 80–100)
PDW BLD-RTO: 20.2 % (ref 12.4–15.4)
PLATELET # BLD: 246 K/UL (ref 135–450)
PLATELET SLIDE REVIEW: ADEQUATE
PMV BLD AUTO: 10.2 FL (ref 5–10.5)
POTASSIUM SERPL-SCNC: 3.6 MMOL/L (ref 3.5–5.1)
PROCALCITONIN: 0.15 NG/ML (ref 0–0.15)
RBC # BLD: 5.43 M/UL (ref 4–5.2)
SEDIMENTATION RATE, ERYTHROCYTE: 14 MM/HR (ref 0–30)
SLIDE REVIEW: ABNORMAL
SODIUM BLD-SCNC: 144 MMOL/L (ref 136–145)
TROPONIN: 0.07 NG/ML
WBC # BLD: 5.9 K/UL (ref 4–11)

## 2023-02-11 PROCEDURE — 85652 RBC SED RATE AUTOMATED: CPT

## 2023-02-11 PROCEDURE — 2580000003 HC RX 258: Performed by: FAMILY MEDICINE

## 2023-02-11 PROCEDURE — 84484 ASSAY OF TROPONIN QUANT: CPT

## 2023-02-11 PROCEDURE — 86140 C-REACTIVE PROTEIN: CPT

## 2023-02-11 PROCEDURE — 36415 COLL VENOUS BLD VENIPUNCTURE: CPT

## 2023-02-11 PROCEDURE — 6370000000 HC RX 637 (ALT 250 FOR IP): Performed by: FAMILY MEDICINE

## 2023-02-11 PROCEDURE — 84145 PROCALCITONIN (PCT): CPT

## 2023-02-11 PROCEDURE — 6360000002 HC RX W HCPCS: Performed by: INTERNAL MEDICINE

## 2023-02-11 PROCEDURE — 80048 BASIC METABOLIC PNL TOTAL CA: CPT

## 2023-02-11 PROCEDURE — 6360000002 HC RX W HCPCS: Performed by: FAMILY MEDICINE

## 2023-02-11 PROCEDURE — 92526 ORAL FUNCTION THERAPY: CPT

## 2023-02-11 PROCEDURE — 93010 ELECTROCARDIOGRAM REPORT: CPT | Performed by: INTERNAL MEDICINE

## 2023-02-11 PROCEDURE — 71250 CT THORAX DX C-: CPT

## 2023-02-11 PROCEDURE — 92610 EVALUATE SWALLOWING FUNCTION: CPT

## 2023-02-11 PROCEDURE — 85027 COMPLETE CBC AUTOMATED: CPT

## 2023-02-11 PROCEDURE — 1200000000 HC SEMI PRIVATE

## 2023-02-11 PROCEDURE — 2580000003 HC RX 258: Performed by: INTERNAL MEDICINE

## 2023-02-11 PROCEDURE — 94760 N-INVAS EAR/PLS OXIMETRY 1: CPT

## 2023-02-11 RX ORDER — METHYLPREDNISOLONE SODIUM SUCCINATE 40 MG/ML
40 INJECTION, POWDER, LYOPHILIZED, FOR SOLUTION INTRAMUSCULAR; INTRAVENOUS DAILY
Status: DISCONTINUED | OUTPATIENT
Start: 2023-02-11 | End: 2023-02-12

## 2023-02-11 RX ORDER — FUROSEMIDE 10 MG/ML
40 INJECTION INTRAMUSCULAR; INTRAVENOUS DAILY
Status: DISCONTINUED | OUTPATIENT
Start: 2023-02-11 | End: 2023-02-11

## 2023-02-11 RX ADMIN — ROSUVASTATIN 5 MG: 10 TABLET, FILM COATED ORAL at 21:11

## 2023-02-11 RX ADMIN — FUROSEMIDE 40 MG: 10 INJECTION, SOLUTION INTRAMUSCULAR; INTRAVENOUS at 08:19

## 2023-02-11 RX ADMIN — CARVEDILOL 25 MG: 25 TABLET, FILM COATED ORAL at 17:51

## 2023-02-11 RX ADMIN — CEFTRIAXONE SODIUM 1000 MG: 1 INJECTION, POWDER, FOR SOLUTION INTRAMUSCULAR; INTRAVENOUS at 21:14

## 2023-02-11 RX ADMIN — CLOPIDOGREL BISULFATE 75 MG: 75 TABLET ORAL at 08:18

## 2023-02-11 RX ADMIN — FAMOTIDINE 40 MG: 20 TABLET, FILM COATED ORAL at 08:19

## 2023-02-11 RX ADMIN — SODIUM CHLORIDE, PRESERVATIVE FREE 10 ML: 5 INJECTION INTRAVENOUS at 08:20

## 2023-02-11 RX ADMIN — HEPARIN SODIUM 5000 UNITS: 5000 INJECTION INTRAVENOUS; SUBCUTANEOUS at 21:10

## 2023-02-11 RX ADMIN — TOPIRAMATE 100 MG: 100 TABLET, FILM COATED ORAL at 08:18

## 2023-02-11 RX ADMIN — LEVOTHYROXINE SODIUM 125 MCG: 0.03 TABLET ORAL at 05:38

## 2023-02-11 RX ADMIN — FAMOTIDINE 40 MG: 20 TABLET, FILM COATED ORAL at 21:10

## 2023-02-11 RX ADMIN — SODIUM CHLORIDE: 9 INJECTION, SOLUTION INTRAVENOUS at 17:59

## 2023-02-11 RX ADMIN — METHYLPREDNISOLONE SODIUM SUCCINATE 40 MG: 40 INJECTION, POWDER, FOR SOLUTION INTRAMUSCULAR; INTRAVENOUS at 08:19

## 2023-02-11 RX ADMIN — CARVEDILOL 25 MG: 25 TABLET, FILM COATED ORAL at 08:18

## 2023-02-11 RX ADMIN — TOPIRAMATE 100 MG: 100 TABLET, FILM COATED ORAL at 21:11

## 2023-02-11 RX ADMIN — HEPARIN SODIUM 5000 UNITS: 5000 INJECTION INTRAVENOUS; SUBCUTANEOUS at 08:19

## 2023-02-11 RX ADMIN — TORSEMIDE 10 MG: 20 TABLET ORAL at 08:18

## 2023-02-11 RX ADMIN — AZITHROMYCIN MONOHYDRATE 500 MG: 500 INJECTION, POWDER, LYOPHILIZED, FOR SOLUTION INTRAVENOUS at 18:01

## 2023-02-11 ASSESSMENT — PAIN SCALES - GENERAL
PAINLEVEL_OUTOF10: 0

## 2023-02-11 NOTE — PROGRESS NOTES
4 Eyes Skin Assessment     NAME:  Rdaha Ordaz  YOB: 1940  MEDICAL RECORD NUMBER:  [id number]    The patient is being assessed for  Admission    I agree that One RN has performed a thorough Head to Toe Skin Assessment on the patient. ALL assessment sites listed below have been assessed. Areas assessed by both nurses:    Head, Face, Ears, Shoulders, Back, Chest, Arms, Elbows, Hands, Sacrum. Buttock, Coccyx, Ischium, and Legs. Feet and Heels        Does the Patient have a Wound?  No noted wound(s)       Conor Prevention initiated by RN: No  Wound Care Orders initiated by RN: No    Pressure Injury (Stage 3,4, Unstageable, DTI, NWPT, and Complex wounds) if present, place referral order by RN under : No    New and Established Ostomies, if present place, referral order under : No      Nurse 1 eSignature: Electronically signed by Lashell Garcia RN on 2/10/23 at 11:37 PM EST    **SHARE this note so that the co-signing nurse can place an eSignature**    Nurse 2 eSignature: Electronically signed by Syed Elam RN on 2/10/23 at 11:38 PM EST

## 2023-02-11 NOTE — PLAN OF CARE
Problem: Discharge Planning  Goal: Discharge to home or other facility with appropriate resources  2/11/2023 1158 by Kelle Buck RN  Outcome: Progressing  Flowsheets (Taken 2/11/2023 0800)  Discharge to home or other facility with appropriate resources:   Identify barriers to discharge with patient and caregiver   Arrange for needed discharge resources and transportation as appropriate   Identify discharge learning needs (meds, wound care, etc)   Refer to discharge planning if patient needs post-hospital services based on physician order or complex needs related to functional status, cognitive ability or social support system  2/10/2023 2352 by Ghazala Gracia RN  Outcome: Progressing     Problem: Safety - Adult  Goal: Free from fall injury  2/11/2023 1158 by Kelle Buck RN  Outcome: Progressing  [address] (Taken 2/11/2023 1000)  Free From Fall Injury: Instruct family/caregiver on patient safety  2/10/2023 2352 by Ghazala Gracia RN  Outcome: Progressing     Problem: ABCDS Injury Assessment  Goal: Absence of physical injury  2/11/2023 1158 by Kelle Buck RN  Outcome: Progressing  Flowsheets (Taken 2/11/2023 1000)  Absence of Physical Injury: Implement safety measures based on patient assessment  2/10/2023 2352 by Ghazala Gracia RN  Outcome: Progressing

## 2023-02-11 NOTE — PROGRESS NOTES
Admission assessment completed. Please see complex vital flowsheet for complete assessment. Pt arrived via bed at this time. Self transferred w/ +1 standby assist. Awake. A&O x4. Calm and able to follow commands. VSS. 2L NC. Dropped down to 1L. Paced, underlined a-fib. Denies pain at this time. Bundled and lights dimmed for comfort.

## 2023-02-11 NOTE — ED PROVIDER NOTES
[address] [facility]        Pt Name: Tati Hylton  MRN: [id number]  [place] 1940  Date of evaluation: 2/10/2023  Provider: Kojo Martinez PA-C  PCP: Ricarda Kern MD  Note Started: 7:39 PM EST 2/10/23       I have seen and evaluated this patient with my supervising physician Nahomi Nunez. CHIEF COMPLAINT       Chief Complaint   Patient presents with    Congestive Heart Failure     CHF, congestion, weakness, fatigue, daughter states she needs to be admitted for CHF       HISTORY OF PRESENT ILLNESS: 1 or more Elements     History From: patient  Limitations to history : None    Tati Hylton is a 80 y.o. female who presents to the emergency department with a chief complaint of worsening weakness, chest congestion and shortness of breath. This been ongoing for the past week or so. She was seen here 2 days ago and had proBNP over 28,000 and x-ray imaging of the reveal pulmonary edema and was started on IV Lasix and she wished to go home. Was seen yesterday at [facility] for CHF follow-up and received 120 mg of IV Lasix. She states she is feeling worse so presented to the emergency department here today. Daughter is also concerned that she had a recent UTI given her fatigue for possible UTI but patient denies dysuria or hematuria. Denies chest pain or any pain associated with this. Does have some chronic right arm pain but denies any change in this. Denies fevers, nausea, vomiting. Denies weight gain or leg swelling. Nursing Notes were all reviewed and agreed with or any disagreements were addressed in the HPI. REVIEW OF SYSTEMS :      Review of Systems    Positives and Pertinent negatives as per HPI.      SURGICAL HISTORY     Past Surgical History:   Procedure Laterality Date    BACK SURGERY      CARDIAC CATHETERIZATION  7/2012    CARDIAC CATHETERIZATION  08/07/2018    Unsuccesful  of RCA    CARDIAC SURGERY      CABG & Cardiac ablation    CHOLECYSTECTOMY      CORONARY ARTERY BYPASS GRAFT  1987    LIMA- Diag/LAD, SVG- RCA    FEMORAL BYPASS Left 8/22/2019    LEFT FEMORAL TO POPLITEAL BYPASS GRAFT performed by Cezar Mondragon MD at [address] [facility] GRAFT N/A 8/22/2019    FEMORAL TO FEMORAL BYPASS performed by Cezar Mondragon MD at [facility] Left 03/16/2017    Left hip pinning    IR KYPHOPLASTY LUMBAR FIRST LEVEL  5/14/2021    IR KYPHOPLASTY LUMBAR FIRST LEVEL 5/14/2021 [facility] SPECIAL PROCEDURES    JOINT REPLACEMENT      GA NJX AA&/STRD TFRML EPI LUMBAR/SACRAL 1 LEVEL Right 12/3/2018    RIGHT L3 AND L4 LUMBAR TRANSFORAMINAL EPIRUAL STEROID INJECTION WITH FLUOROSCOPY performed by Jeronimo Rodriguez MD at [address] [facility] [address]    PTCA  11/04/2014    MARLENY - 3.0 x 28 to the [facility]      left       [address]       Current Discharge Medication List        CONTINUE these medications which have NOT CHANGED    Details   albuterol sulfate HFA (PROVENTIL HFA) 108 (90 Base) MCG/ACT inhaler Inhale 2 puffs into the lungs every 4 hours as needed for Wheezing or Shortness of Breath (Space out to every 6 hours as symptoms improve) Space out to every 6 hours as symptoms improve.   Qty: 18 g, Refills: 0      famotidine (PEPCID) 40 MG tablet Take 1 tablet by mouth 2 times daily  Qty: 60 tablet, Refills: 0      predniSONE (DELTASONE) 10 MG tablet 1 TID for 3 day then 1 BID  Qty: 15 tablet, Refills: 0      nitroGLYCERIN (NITROLINGUAL) 0.4 MG/SPRAY 0.4 mg spray Place 1 spray under the tongue every 5 minutes as needed for Chest pain  Qty: 4.9 g, Refills: 1      torsemide (DEMADEX) 20 MG tablet 10 mg every day except Sunday and as directed for weight gain  Qty: 60 tablet, Refills: 1      clopidogrel (PLAVIX) 75 MG tablet Take 1 tablet by mouth daily  Qty: 90 tablet, Refills: 2    Comments: Patient lost her prescription that she picked up      carvedilol (COREG) 25 MG tablet Take 1 tablet by mouth 2 times daily (with meals)  Qty: 180 tablet, Refills: 3      rosuvastatin (CRESTOR) 5 MG tablet Take 1 tablet by mouth nightly  Qty: 90 tablet, Refills: 1      levothyroxine (SYNTHROID) 125 MCG tablet Take 1 tablet by mouth Daily  Qty: 90 tablet, Refills: 0      aspirin EC 81 MG EC tablet Take 1 tablet by mouth daily  Qty: 90 tablet, Refills: 1      topiramate (TOPAMAX) 50 MG tablet TAKE 2 TABLETS TWICE DAILY  Qty: 360 tablet, Refills: 0      vitamin D (ERGOCALCIFEROL) 1.25 MG (73822 UT) CAPS capsule TAKE 1 CAPSULE ONCE A WEEK  Qty: 12 capsule, Refills: 1      allopurinol (ZYLOPRIM) 100 MG tablet TAKE 1 TABLET EVERY DAY  Qty: 90 tablet, Refills: 1      fluticasone (FLONASE) 50 MCG/ACT nasal spray 2 sprays by Each Nostril route daily  Qty: 16 g, Refills: 0             ALLERGIES     Aspirin, Diltiazem, Diltiazem hcl, Lorazepam, Sulfa antibiotics, Atorvastatin, Dabigatran, Mysoline [primidone], Nsaids, Other, and Primidone    FAMILYHISTORY       Family History   Problem Relation Age of Onset    Cancer Sister     Heart Disease Sister     Diabetes Brother     Hypertension Brother     Heart Disease Brother     Stroke Maternal Aunt     Heart Disease Maternal Aunt     Diabetes Maternal Uncle     Hypertension Paternal Aunt     Cancer Maternal Grandmother     Cancer Paternal Grandmother     Rheum Arthritis Neg Hx     Lupus Neg Hx         SOCIAL HISTORY       Social History     Tobacco Use    Smoking status: Former     Packs/day: 1.00     Years: 28.00     Pack years: 28.00     Types: Cigarettes     Quit date: 1987     Years since quittin.1    Smokeless tobacco: Never    Tobacco comments:     H.O.smoking at age 15 / smoked up to 1 p.p.d / quit    Vaping Use    Vaping Use: Never used   Substance Use Topics    Alcohol use: Yes     Comment: social    Drug use: No       SCREENINGS        Ronit Coma Scale  Eye Opening: Spontaneous  Best Verbal Response: Oriented  Best Motor Response: Obeys commands  Ronit Coma Scale Score: 15 WA Assessment  BP: 135/65  Heart Rate: 86           PHYSICAL EXAM  1 or more Elements     ED Triage Vitals [02/10/23 1503]   BP Temp Temp Source Heart Rate Resp SpO2 Height Weight   116/64 98.4 °F (36.9 °C) Oral 91 18 98 % 5' 3\" (1.6 m) 111 lb (50.3 kg)       Physical Exam  Vitals and nursing note reviewed. Constitutional:       Appearance: She is well-developed. She is not diaphoretic. HENT:      Head: Atraumatic. Nose: Nose normal.   Eyes:      General:         Right eye: No discharge. Left eye: No discharge. Cardiovascular:      Rate and Rhythm: Normal rate and regular rhythm. Heart sounds: No murmur heard. No friction rub. No gallop. Pulmonary:      Effort: Pulmonary effort is normal. No respiratory distress. Breath sounds: No stridor. Rhonchi present. No wheezing or rales. Comments: Some rhonchi bilaterally without retractions or accessory muscle use. Abdominal:      General: Bowel sounds are normal. There is no distension. Palpations: Abdomen is soft. There is no mass. Tenderness: There is no abdominal tenderness. There is no guarding or rebound. Hernia: No hernia is present. Musculoskeletal:         General: No swelling. Normal range of motion. Cervical back: Normal range of motion. Skin:     General: Skin is warm and dry. Findings: No erythema or rash. Neurological:      Mental Status: She is alert and oriented to person, place, and time. Cranial Nerves: No cranial nerve deficit.    Psychiatric:         Behavior: Behavior normal.           DIAGNOSTIC RESULTS   LABS:    Labs Reviewed   CBC WITH AUTO DIFFERENTIAL - Abnormal; Notable for the following components:       Result Value    RBC 6.26 (*)     MCV 76.7 (*)     MCH 22.5 (*)     MCHC 29.3 (*)     RDW 21.3 (*)     Lymphocytes Absolute 0.8 (*)     All other components within normal limits   URINALYSIS WITH REFLEX TO CULTURE - Abnormal; Notable for the following components: Clarity, UA CLOUDY (*)     All other components within normal limits   BASIC METABOLIC PANEL - Abnormal; Notable for the following components:    BUN 48 (*)     Creatinine 1.7 (*)     Est, Glom Filt Rate 30 (*)     All other components within normal limits   BRAIN NATRIURETIC PEPTIDE - Abnormal; Notable for the following components:    Pro-BNP 13,600 (*)     All other components within normal limits   TROPONIN - Abnormal; Notable for the following components:    Troponin 0.07 (*)     All other components within normal limits   SPECIMEN REJECTION    Narrative:     CALL  Chavez  SFER tel. [phone],  Rejected BMP BNPEP TROP/Called to: LAQUITA CAMERON, 02/10/2023 16:05, by Debra Campbell   MICROSCOPIC URINALYSIS   CBC   BASIC METABOLIC PANEL       When ordered only abnormal lab results are displayed. All other labs were within normal range or not returned as of this dictation. EKG: When ordered, EKG's are interpreted by the Emergency Department Physician in the absence of a cardiologist.  Please see their note for interpretation of EKG. RADIOLOGY:   Non-plain film images such as CT, Ultrasound and MRI are read by the radiologist. Plain radiographic images are visualized and preliminarily interpreted by the ED Provider with the below findings:        Interpretation per the Radiologist below, if available at the time of this note:    XR CHEST PORTABLE   Final Result      Opacities developing in the right upper lobe           XR CHEST PORTABLE    Result Date: 2/10/2023  EXAMINATION: [address] 2/10/2023 3:44 pm COMPARISON: 02/08/2023 HISTORY: ORDERING SYSTEM PROVIDED HISTORY: sob TECHNOLOGIST PROVIDED HISTORY: Reason for exam:->sob Reason for Exam: sob FINDINGS: Heart size is enlarged without change aorta is normal.  Lungs are normally expanded. Patchy opacities developing in the right upper lobe. Prominence of the perihilar regions but this is unchanged. Codie Crape No pleural effusions.  Mild spondylosis     Opacities developing in the right upper lobe     XR CHEST PORTABLE    Result Date: 2/9/2023  EXAMINATION: ONE XRAY VIEW OF THE CHEST 2/8/2023 10:23 pm COMPARISON: 11/11/2022 HISTORY: ORDERING SYSTEM PROVIDED HISTORY: cough TECHNOLOGIST PROVIDED HISTORY: Reason for exam:->cough Reason for Exam: cough FINDINGS: A single frontal view of the chest was performed. There is no acute skeletal abnormality. There is a stable left subclavian pacemaker in proper position. The heart size is enlarged, though stable. The mediastinal contours are stable, with stable atherosclerotic disease of the intrathoracic aorta. The patient is status post CABG. There is mild interstitial pulmonary edema. There is chronic blunting of the left costophrenic angle, likely secondary to chronic pleural scar. No focus of acute airspace consolidation is identified. There is no evidence of a pneumothorax. 1. Mild interstitial pulmonary edema, without acute airspace consolidation. 2. Stable chronic cardiomegaly. XR CHEST PORTABLE    Result Date: 2/6/2023  Site: [facility] #: [id number] #: [id number]: BTLREDAccount #: [de-identified] #: [id number]: [id number]: XR CHEST AP PORTABLEExam Date/Time: 02/06/2023 01:15 PMAdmitting Diagnosis: Chronic coughReason for Exam: Chronic cough Dictated by: Anne-Marie Manual ANÍBAL: 02/06/2023 02:03 PMT: This document is confidential medical information. Unauthorized disclosure or use of this information is prohibited by law. If you are not the intended recipient of this document, please advise us by calling immediately [phone]. Impression/Conclusion below HISTORY:   Chronic cough cough COMPARISON: 4/22/2022 NOTE:  If there are questions about the content of this report, please contact [address] [place] [address] radiology by calling [phone] FINDINGS: LINES/DEVICES: Implanted left chest device. LUNGS/PLEURA:  No acute infiltrate. No pneumothorax. Stable interstitial prominence.  HEART:  Upper limits size of the heart. MEDIASTINUM/KRYSTIAN:  Unremarkable BONES:  Osteopenia OTHER:  None  IMPRESSION: Stable nonacute chest. SIGNED BY: Kenya Swanson Comment, DO on 2/6/2023  2:00 PM   [facility] Imaging Report - [address] [facility],[address] [phone] -  [facility] Call Center: [phone]         No results found. PROCEDURES   Unless otherwise noted below, none     Procedures    CRITICAL CARE TIME (.cctime)       PAST MEDICAL HISTORY      has a past medical history of Allergic rhinitis, cause unspecified, Arthritis, Atrial fibrillation ([facility] [address] Bronchopneumonia, CAD (coronary artery disease), Cerebral artery occlusion with cerebral infarction [facility]), Chronic gouty arthropathy, Chronic kidney disease, Congestive heart failure, unspecified, Degeneration of cervical intervertebral disc, Essential and other specified forms of tremor, Gout, HIGH CHOLESTEROL, History of CVA (cerebrovascular accident), blood clots, Hypertension, Influenza (12/23/2017), Leakage of Watchman left atrial appendage closure device, Mitral valve stenosis and aortic valve insufficiency, Movement disorder, Pacemaker, Peptic ulcer, unspecified site, unspecified as acute or chronic, without mention of hemorrhage, perforation, or obstruction, Thyroid disease, Unspecified disorder of kidney and ureter, and Unspecified hypothyroidism.      EMERGENCY DEPARTMENT COURSE and DIFFERENTIAL DIAGNOSIS/MDM:   Vitals:    Vitals:    02/10/23 1656 02/10/23 1900 02/10/23 2008 02/10/23 2129   BP: 128/80 134/69 (!) 127/52 135/65   Pulse: 91 84 71 86   Resp: 20 18 23 21   Temp:   98.4 °F (36.9 °C) 98.7 °F (37.1 °C)   TempSrc:   Oral Oral   SpO2: 100% 100% 99% 100%   Weight:    109 lb 8 oz (49.7 kg)   Height:           Patient was given the following medications:  Medications   cefTRIAXone (ROCEPHIN) 1,000 mg in sodium chloride 0.9 % 50 mL IVPB (mini-bag) (has no administration in time range)   azithromycin (ZITHROMAX) 500 mg in sodium chloride 0.9 % 250 mL (vial-mate) IVPB (has no administration in time range)   albuterol sulfate HFA (PROVENTIL;VENTOLIN;PROAIR) 108 (90 Base) MCG/ACT inhaler 2 puff (has no administration in time range)   carvedilol (COREG) tablet 25 mg (has no administration in time range)   clopidogrel (PLAVIX) tablet 75 mg (has no administration in time range)   famotidine (PEPCID) tablet 40 mg (40 mg Oral Given 2/10/23 2149)   levothyroxine (SYNTHROID) tablet 125 mcg (has no administration in time range)   nitroGLYCERIN (NITROLINGUAL) 0.4 mg spray 1 spray (has no administration in time range)   rosuvastatin (CRESTOR) tablet 5 mg (5 mg Oral Given 2/10/23 2149)   topiramate (TOPAMAX) tablet 100 mg (100 mg Oral Given 2/10/23 2149)   torsemide (DEMADEX) tablet 10 mg (has no administration in time range)   sodium chloride flush 0.9 % injection 5-40 mL (10 mLs IntraVENous Given 2/10/23 2152)   sodium chloride flush 0.9 % injection 5-40 mL (has no administration in time range)   0.9 % sodium chloride infusion (has no administration in time range)   heparin (porcine) injection 5,000 Units (5,000 Units SubCUTAneous Given 2/10/23 2149)   ondansetron (ZOFRAN-ODT) disintegrating tablet 4 mg (has no administration in time range)     Or   ondansetron (ZOFRAN) injection 4 mg (has no administration in time range)   polyethylene glycol (GLYCOLAX) packet 17 g (has no administration in time range)   acetaminophen (TYLENOL) tablet 650 mg (has no administration in time range)     Or   acetaminophen (TYLENOL) suppository 650 mg (has no administration in time range)   azithromycin (ZITHROMAX) 500 mg in sodium chloride 0.9 % 250 mL (vial-mate) IVPB (0 mg IntraVENous Stopped 2/10/23 2002)             Is this patient to be included in the SEP-1 Core Measure due to severe sepsis or septic shock?    No   Exclusion criteria - the patient is NOT to be included for SEP-1 Core Measure due to:  2+ SIRS criteria are not met    Chronic Conditions affecting care:    has a past medical history of Allergic rhinitis, cause unspecified, Arthritis, Atrial fibrillation ([facility] [address] Bronchopneumonia, CAD (coronary artery disease), Cerebral artery occlusion with cerebral infarction [facility]), Chronic gouty arthropathy, Chronic kidney disease, Congestive heart failure, unspecified, Degeneration of cervical intervertebral disc, Essential and other specified forms of tremor, Gout, HIGH CHOLESTEROL, History of CVA (cerebrovascular accident), blood clots, Hypertension, Influenza (12/23/2017), Leakage of Watchman left atrial appendage closure device, Mitral valve stenosis and aortic valve insufficiency, Movement disorder, Pacemaker, Peptic ulcer, unspecified site, unspecified as acute or chronic, without mention of hemorrhage, perforation, or obstruction, Thyroid disease, Unspecified disorder of kidney and ureter, and Unspecified hypothyroidism. CONSULTS: (Who and What was discussed)  IP CONSULT TO HOSPITALIST      Social Determinants Significantly Affecting Health : None    Records Reviewed (External and Source)     CC/HPI Summary, DDx, ED Course, and Reassessment: Patient presented with continual chest congestion, shortness of breath and fatigue. Creatinine has steadily been increasing since November of last year up to 1.7 today from 1.6 two days ago. Troponin is also elevated from 0.04 to 0.07 from 2 days ago. proBNP is actually improved and x-ray imaging reviewed by myself actually looks improved from 2 days ago from which she had pulmonary edema bilaterally. However given her persistent symptoms with elevating troponin and possible developing opacity will start on antibiotics but have still strong suspicion for CHF. Discussed with hospitalist who admit for further work-up and treatment. Patient was stable at time of admission. Disposition Considerations (tests considered but not done, Admit vs D/C, Shared Decision Making, Pt Expectation of Test or Tx.):        I am the Primary Clinician of Record.   FINAL IMPRESSION      1. Acute pulmonary edema (HCC)    2. Elevated troponin    3. Elevated serum creatinine    4. Pneumonia due to infectious organism, unspecified laterality, unspecified part of lung          DISPOSITION/PLAN     DISPOSITION Admitted 02/10/2023 06:41:30 PM      PATIENT REFERRED TO:  No follow-up provider specified.     DISCHARGE MEDICATIONS:  Current Discharge Medication List          DISCONTINUED MEDICATIONS:  Current Discharge Medication List                 (Please note that portions of this note were completed with a voice recognition program.  Efforts were made to edit the dictations but occasionally words are mis-transcribed.)    Clifford Gannon PA-C (electronically signed)       Clifford Gannon PA-C  02/10/23 [phone]

## 2023-02-11 NOTE — PROGRESS NOTES
Facility/Department: [address]  Speech Language Pathology  DYSPHAGIA BEDSIDE SWALLOW EVALUATION     Patient: Vivian Lagunas   : 1940   MRN: [id number]      Evaluation Date: 2023   Admitting Diagnosis: Acute pulmonary edema ([address] [J81.0]  Dyspnea [R06.00]  Elevated serum creatinine [R79.89]  Elevated troponin [R77.8]  Pneumonia due to infectious organism, unspecified laterality, unspecified part of lung [J18.9]  Pain: Did not state                                                       H&P: Vivian Lagunas is a 80 y.o. female with h/o CHF, hypothyroidism, CVA, COPD, CAD, HTN, mitral valve disease, CABG presented with c/o chest and nasal congestion, cough fatigue, and weakness. +ve sick contact Symptoms are present for the past week. She was seen  at [facility] ED on  for these symptoms. Rapid influenza and COVID neg. Was diagnosed with Chf . She then followed up with Cardiology on  also received  Lasix gtt  and infusion center. The pt symptoms persisted so  the daughter brought her back to the ED today. Chest xray showed   Opacities developing in the right upper lobe. The pt has been started on IV antibiotics    Imaging:  Chest X-ray: Opacities developing in the right upper lobe    No MRI or Head CT reported within the pt's chart    No Prior Fiberoptic Endoscopic Evaluation of Swallowing (FEES) or Modified Barium Swallow Study (MBSS) reported within the pt's chart    History/Prior Level of Function:   Living Status: private residence, pt reports living alone  Prior Dysphagia History: - recommended for the regular/thin diet with medications as tolerated  Reason for referral: SLP evaluation orders received due to coughing with intake  and prior hx of dysphagia     Dysphagia Impressions/Diagnosis: Oropharyngeal Dysphagia   Pt alert and cooperative, on room air. The pt reports no current concerns with her mastication or swallow. She presents with a congested cough prior to PO trials.  The pt demonstrates reduced lingual ROM/coordination, determined via oral mechanism exam. The pt presents with reduced laryngeal excursion, determined via palpation. Pt presented with the following PO trials: ice chips, thin liquids, puree, soft solid and a regular texture. The pt demonstrates mildly prolonged mastication but was able to achieve full oral clearance across all consistencies. The pt presented with a delayed congested cough following intake of the puree and regular textured trials (but she additionally had congested coughing prior to PO trials), difficult to determine at the bedside if d/t oral intake vs. PNA. Recommend the regular textured diet with thin liquids and medications whole with water or in puree. If the pt continues to demonstrate overt signs/symptoms of penetration/aspiration with oral intake, she may benefit from completing an instrumental swallow assessment (either MBSS or FEES). Recommended Diet and Intervention 2/11/2023:  Diet Solids Recommendation:  Regular texture diet  Liquid Consistency Recommendation: Thin liquids  Recommended form of Meds: Meds whole with water or Meds in puree           Compensatory Swallowing Strategies: Alternate solids/liquids , Upright as possible with all PO intake , Small bites/sips , Eat/feed slowly    SHORT TERM DYSPHAGIA GOALS/PLAN OF CARE: Speech therapy for dysphagia tx 1-2 times to ensure diet tolerance.   Pt will functionally tolerate recommended diet with no overt clinical s/s of aspiration   Pt will demonstrate understanding of aspiration risk and precautions via education/demonstration with occasional prompting  If clinical s/s of aspiration/penetration continue to be noted, Pt will participate in instrumental swallow study, either Modified Barium Swallow Study or Fiberoptic Endoscopic Evaluation of Swallowing (FEES)     Dysphagia Therapeutic Intervention:  Diet Tolerance Monitoring , Patient/Family Education , Instrumental assessment of swallow function (Modified Barium Swallow Study) , Instrumental assessment of swallow function (SLP FEES EVALUATION)     Discharge Recommendations: Discharge recommendations to be determined pending ongoing follow-up during acute care stay    Patient Positioning: Upright in bed     Current Diet Level (prior to evaluation): Regular texture diet  Thin liquids      Respiratory Status:   [x]Room Air   []O2 via nasal cannula   []Other:    Dentition:  []Adequate  [x]Dentures   []Missing Many Teeth  []Edentulous  []Other:    Baseline Vocal Quality:  [x]Normal  []Dysphonic   []Aphonic   []Hoarse  []Wet  []Weak  []Other:    Volitional Cough:  Elicited: Congested     Volitional Swallow:   []Absent   []Delayed     [x]Adequate     []Required use of drink     Oral Mechanism Exam:  []WFL [x]Mild   [] Moderate  []Severe  []To be assessed  Impaired:   []Left side      []Right side    []Labial ROM/Coordination    []Labial Symmetry   [x]Lingual ROM/Coordination   []Lingual Symmetry  []Gag  []Other:     Oral Phase: []WFL [x]Mild   [] Moderate  []Severe  []To be assessed   [x]Impaired/Prolonged Mastication:   []Oral Holding:   []Spillage Left:   []Spillage Right:  []Pocketing Left:   []Pocketing Right:   []Decreased Anterior to Posterior Transit:   []Suspected Premature Bolus Loss:   []Lingual/Palatal Residue:   []Other:     Pharyngeal Phase: []WFL [x]Mild   [] Moderate  []Severe  []To be assessed   []Delayed Swallow:   []Suspected Pharyngeal Pooling:   [x]Decreased Laryngeal Elevation:   []Absent Swallow:  []Wet Vocal Quality:   []Throat Clearing-Immediate:   []Throat Clearing-Delayed:   []Cough-Immediate:   [x]Cough-Delayed: difficult to determine if d/t oral intake vs. PNA  []Change in Vital Signs:  []Suspected Delayed Pharyngeal Clearing:  []Other:     Eating Assistance:  [x]Independent  []Setup or clean-up assistance   [] Supervision or touching assistance   [] Partial or moderate assistance   [] Substantial or maximal assistance [] Dependent     EDUCATION:   Provided education regarding role of SLP, results of assessment, recommendations and general speech pathology plan of care. [x] Pt verbalized understanding and agreement   [] Pt requires ongoing learning   [] No evidence of comprehension     If patient discharges prior to next visit, this note will serve as discharge. Treatment time:  Timed Code Treatment Minutes: 0  Total Treatment time: 23 minutes    Electronically signed by:    GILES Dyer #[id number]. [id number] [facility]

## 2023-02-11 NOTE — PROGRESS NOTES
Patient seen in ED, room 1. Admission initiated and completed through the home medication reconciliation. Nurse will need to begin at the belongings and complete all the assessments in addition to the 4 Eyes Assessment, Immunizations, Covid Vaccines, Rights and Responsibilities, Orientation to room, Plan of Care, education, white board, height and weight, pain assessment and head to toe assessment. Patient is alert and oriented X 4. Patient is being admitted for dyspnea. Home Medication Reconciliation has been verbally reviewed with patient and updated as appropriate. The outside sources were also reviewed and the home medication reconciliation updated as necessary; completed. All questions answered.

## 2023-02-11 NOTE — H&P
HOSPITALISTS HISTORY AND PHYSICAL    2/10/2023 9:27 PM    Patient Information:  Khadijah Walker is a 80 y.o. female [id number]  PCP:  Bettyjo Gottron, MD (Tel: [phone] )    Chief complaint:    Chief Complaint   Patient presents with    Congestive Heart Failure     CHF, congestion, weakness, fatigue, daughter states she needs to be admitted for CHF        History of Present Illness:  Soo Herrera is a 80 y.o. female with h/o CHF, hypothyroidism, CVA, COPD, CAD, HTN, mitral valve disease, CABG presented with c/o chest and nasal congestion, cough fatigue, and weakness. +ve sick contact Symptoms are present for the past week. She was seen  at [facility] ED on 02/08 for these symptoms. Rapid influenza and COVID neg. Was diagnosed with Chf . She then followed up with Cardiology on 02/09 also received  Lasix gtt  and infusion center. The pt symptoms persisted so  the daughter brought her back to the ED today. Chest xray showed   Opacities developing in the right upper lobe. The pt has been started on IV antibiotics        REVIEW OF SYSTEMS:   Constitutional: Negative for fever,chills or night sweats  ENT: Negative for rhinorrhea, epistaxis, hoarseness, sore throat. Respiratory: +Ve for shortness of breath,wheezing  Cardiovascular: Negative for chest pain, palpitations   Gastrointestinal: Negative for nausea, vomiting, diarrhea  Genitourinary: Negative for polyuria, dysuria   Hematologic/Lymphatic: Negative for bleeding tendency, easy bruising  Musculoskeletal: Negative for myalgias and arthralgias  Neurologic: Negative for confusion,dysarthria. Skin: Negative for itching,rash  Psychiatric: Negative for depression,anxiety, agitation. Endocrine: Negative for polydipsia,polyuria,heat /cold intolerance.     Past Medical History:   has a past medical history of Allergic rhinitis, cause unspecified, Arthritis, Atrial fibrillation (HCC), Bronchopneumonia, CAD (coronary artery disease), Cerebral artery occlusion with cerebral infarction ([facility] [address] Chronic gouty arthropathy, Chronic kidney disease, Congestive heart failure, unspecified, Degeneration of cervical intervertebral disc, Essential and other specified forms of tremor, Gout, HIGH CHOLESTEROL, History of CVA (cerebrovascular accident), Hx of blood clots, Hypertension, Influenza, Leakage of Watchman left atrial appendage closure device, Mitral valve stenosis and aortic valve insufficiency, Movement disorder, Pacemaker, Peptic ulcer, unspecified site, unspecified as acute or chronic, without mention of hemorrhage, perforation, or obstruction, Thyroid disease, Unspecified disorder of kidney and ureter, and Unspecified hypothyroidism. Past Surgical History:   has a past surgical history that includes back surgery; Cholecystectomy; Cardiac surgery; Coronary artery bypass graft (1987); shoulder surgery; Cardiac catheterization (7/2012); hip surgery (Left, 03/16/2017); joint replacement; Cardiac catheterization (08/07/2018); Percutaneous Transluminal Coronary Angio (11/04/2014); pr njx aa&/strd tfrml epi lumbar/sacral 1 level (Right, 12/3/2018); Femoral-femoral Bypass Graft (N/A, 8/22/2019); femoral bypass (Left, 8/22/2019); and IR KYPHOPLASTY LUMBAR 1 VERTEBRAL BODY (5/14/2021). Medications:  No current facility-administered medications on file prior to encounter. Current Outpatient Medications on File Prior to Encounter   Medication Sig Dispense Refill    albuterol sulfate HFA (PROVENTIL HFA) 108 (90 Base) MCG/ACT inhaler Inhale 2 puffs into the lungs every 4 hours as needed for Wheezing or Shortness of Breath (Space out to every 6 hours as symptoms improve) Space out to every 6 hours as symptoms improve.  18 g 0    famotidine (PEPCID) 40 MG tablet Take 1 tablet by mouth 2 times daily (Patient not taking: No sig reported) 60 tablet 0    predniSONE (DELTASONE) 10 MG tablet 1 TID for 3 day then 1 BID (Patient not taking: Reported on 2/10/2023) 15 tablet 0    nitroGLYCERIN (NITROLINGUAL) 0.4 MG/SPRAY 0.4 mg spray Place 1 spray under the tongue every 5 minutes as needed for Chest pain 4.9 g 1    torsemide (DEMADEX) 20 MG tablet 10 mg every day except Sunday and as directed for weight gain 60 tablet 1    clopidogrel (PLAVIX) 75 MG tablet Take 1 tablet by mouth daily 90 tablet 2    carvedilol (COREG) 25 MG tablet Take 1 tablet by mouth 2 times daily (with meals) (Patient taking differently: Take 25 mg by mouth 2 times daily) 180 tablet 3    rosuvastatin (CRESTOR) 5 MG tablet Take 1 tablet by mouth nightly 90 tablet 1    levothyroxine (SYNTHROID) 125 MCG tablet Take 1 tablet by mouth Daily 90 tablet 0    aspirin EC 81 MG EC tablet Take 1 tablet by mouth daily 90 tablet 1    topiramate (TOPAMAX) 50 MG tablet TAKE 2 TABLETS TWICE DAILY 360 tablet 0    vitamin D (ERGOCALCIFEROL) 1.25 MG (46372 UT) CAPS capsule TAKE 1 CAPSULE ONCE A WEEK 12 capsule 1    allopurinol (ZYLOPRIM) 100 MG tablet TAKE 1 TABLET EVERY DAY 90 tablet 1    fluticasone (FLONASE) 50 MCG/ACT nasal spray 2 sprays by Each Nostril route daily 16 g 0     Current Facility-Administered Medications   Medication Dose Route Frequency Provider Last Rate Last Admin    cefTRIAXone (ROCEPHIN) 1,000 mg in sodium chloride 0.9 % 50 mL IVPB (mini-bag)  1,000 mg IntraVENous Q24H Alycia Johnson MD        azithromycin (ZITHROMAX) 500 mg in sodium chloride 0.9 % 250 mL (vial-mate) IVPB  500 mg IntraVENous Q24H Alycia Johnson MD        albuterol sulfate HFA (PROVENTIL;VENTOLIN;PROAIR) 108 (90 Base) MCG/ACT inhaler 2 puff  2 puff Inhalation Q4H PRN Alycia Johnson MD        carvedilol (COREG) tablet 25 mg  25 mg Oral BID WC Alycia Johnson MD        clopidogrel (PLAVIX) tablet 75 mg  75 mg Oral Daily Alycia Johnson MD        famotidine (PEPCID) tablet 40 mg  40 mg Oral BID Asuncion Russell MD   40 mg at 02/10/23 2634    levothyroxine (SYNTHROID) tablet 125 mcg  125 mcg Oral Daily Alycia Johnson MD        nitroGLYCERIN (NITROLINGUAL) 0.4 mg spray 1 spray  1 spray SubLINGual Q5 Min PRN Marry Stein MD        rosuvastatin (CRESTOR) tablet 5 mg  5 mg Oral Nightly Marry Stein MD   5 mg at 02/10/23 2149    topiramate (TOPAMAX) tablet 100 mg  100 mg Oral BID Marry Stein MD   100 mg at 02/10/23 2149    torsemide (DEMADEX) tablet 10 mg  10 mg Oral Daily Alycia Holguin MD        sodium chloride flush 0.9 % injection 5-40 mL  5-40 mL IntraVENous 2 times per day Marry Stein MD   10 mL at 02/10/23 2152    sodium chloride flush 0.9 % injection 5-40 mL  5-40 mL IntraVENous PRN Marry Stein MD        0.9 % sodium chloride infusion   IntraVENous PRN Marry Stein MD        heparin (porcine) injection 5,000 Units  5,000 Units SubCUTAneous BID Marry Stein MD   5,000 Units at 02/10/23 2149    ondansetron (ZOFRAN-ODT) disintegrating tablet 4 mg  4 mg Oral Q8H PRN Marry Stein MD        Or    ondansetron (ZOFRAN) injection 4 mg  4 mg IntraVENous Q6H PRN Marry Stein MD        polyethylene glycol (GLYCOLAX) packet 17 g  17 g Oral Daily PRN Marry Stein MD        acetaminophen (TYLENOL) tablet 650 mg  650 mg Oral Q6H PRN Marry Stein MD        Or    acetaminophen (TYLENOL) suppository 650 mg  650 mg Rectal Q6H PRN Marry Stein MD          Allergies:   Allergies   Allergen Reactions    Aspirin Nausea Only    Diltiazem Anaphylaxis    Diltiazem Hcl Anaphylaxis    Lorazepam Other (See Comments)     hallucinations  hallucinations  hallucinations    Sulfa Antibiotics Rash and Hives    Atorvastatin Other (See Comments)     Muscle pains  Muscle pains  Muscle pains    Dabigatran Nausea Only     And indigestion  And indigestion    Aka Pradaxa  And indigestion  And indigestion  And indigestion    Aka Pradaxa  And indigestion  And indigestion  And indigestion    Aka Pradaxa    Mysoline [Primidone]     Nsaids      Other reaction(s): Unknown    Other Other (See Comments) Nitroglycerin patches causes severe headaches    Primidone      Other reaction(s): Unknown        Social History:  Patient Lives    reports that she quit smoking about 36 years ago. Her smoking use included cigarettes. She has a 28.00 pack-year smoking history. She has never used smokeless tobacco. She reports current alcohol use. She reports that she does not use drugs. Family History:  family history includes Cancer in her maternal grandmother, paternal grandmother, and sister; Diabetes in her brother and maternal uncle; Heart Disease in her brother, maternal aunt, and sister; Hypertension in her brother and paternal aunt; Stroke in her maternal aunt. ,     Physical Exam:  BP (!) 127/52   Pulse 71   Temp 98.4 °F (36.9 °C) (Oral)   Resp 23   Ht 5' 3\" (1.6 m)   Wt 111 lb (50.3 kg)   SpO2 99%   BMI 19.66 kg/m²     General appearance:  Appears comfortable. Well nourished  Eyes: Sclera clear, pupils equal  ENT: Moist mucus membranes, no thrush. Trachea midline. Cardiovascular: Regular rhythm, normal S1, S2. No murmur, gallop, rub. No edema in lower extremities  Respiratory: Clear to auscultation bilaterally, no wheeze, good inspiratory effort  Gastrointestinal: Abdomen soft, non-tender, not distended, normal bowel sounds  Musculoskeletal: No cyanosis in digits, neck supple  Neurology: Cranial nerves grossly intact. Alert and oriented in time, place and person. No speech or motor deficits  Psychiatry: Appropriate affect.  Not agitated  Skin: Warm, dry, normal turgor, no rash  Brisk capillary refill, peripheral pulses palpable   Labs:  CBC:   Lab Results   Component Value Date/Time    WBC 6.9 02/10/2023 03:23 PM    RBC 6.26 02/10/2023 03:23 PM    HGB 14.1 02/10/2023 03:23 PM    HCT 48.0 02/10/2023 03:23 PM    MCV 76.7 02/10/2023 03:23 PM    MCH 22.5 02/10/2023 03:23 PM    MCHC 29.3 02/10/2023 03:23 PM    RDW 21.3 02/10/2023 03:23 PM     02/10/2023 03:23 PM    MPV 10.1 02/10/2023 03:23 PM     BMP: Lab Results   Component Value Date/Time     02/10/2023 04:30 PM    K 3.5 02/10/2023 04:30 PM    K 4.4 12/10/2022 01:01 PM     02/10/2023 04:30 PM    CO2 26 02/10/2023 04:30 PM    BUN 48 02/10/2023 04:30 PM    CREATININE 1.7 02/10/2023 04:30 PM    CALCIUM 9.3 02/10/2023 04:30 PM    GFRAA 47 09/16/2022 05:35 AM    GFRAA 38 04/02/2013 09:47 AM    LABGLOM 30 02/10/2023 04:30 PM    GLUCOSE 85 02/10/2023 04:30 PM    GLUCOSE 80 06/08/2022 02:37 PM     XR CHEST PORTABLE   Final Result      Opacities developing in the right upper lobe           Chest Xray:   EKG:    I visualized CXR images and EKG strips    Discussed case  with     Problem List  Principal Problem:    Dyspnea  Resolved Problems:    * No resolved hospital problems. *        Assessment/Plan:   COPD Right upper lobe pneumonia  Chest xray showed Right lobar infiltrates  Concerning for aspiration   Treat with IV antibiotics  SLP eval  Albuterol and IV solumedrol     Acute on Chronic CHF   Elevated pro bnp 13 K  On Demadex at home   Will treat with IV lasix  Low sodium diet   Last ECHO done in 01/23 showed EF 50 % with moderate Tricuspid regurgitation       Acute on chronic renal failure   Cr 1.7, CKD Stage III    Elevated troponin , chronic   Trend     DVT prophylaxis   Code status   Diet   IV access  Zavala Catheter    Admit as inpatient. I anticipate hospitalization spanning more than two midnights for investigation and treatment of the above medically necessary diagnoses. Please note that some part of this chart was generated using Dragon dictation software. Although every effort was made to ensure the accuracy of this automated transcription, some errors in transcription may have occurred inadvertently. If you may need any clarification, please do not hesitate to contact me through [facility].        Bethann Kehr, MD    2/10/2023 9:27 PM

## 2023-02-11 NOTE — PROGRESS NOTES
Hospitalist Progress Note      PCP: Osmel Pack MD    Date of Admission: 2/10/2023    LOS: 1    Chief Complaint:   Chief Complaint   Patient presents with    Congestive Heart Failure     CHF, congestion, weakness, fatigue, daughter states she needs to be admitted for CHF       Case Summary:   77-year-old lady with history of atrial fibrillation, CAD, hypertension, upper lipidemia, hypothyroidism, CHF, status post Watchman procedure, history of pulm pacemaker who presented with generalized malaise and fatigue with shortness of breath, chest congestion. Patient was seen in the cardiology clinic a day prior to presentation and noted to have included overload at which time she received 120 mg of Lasix with good diuresis. At that time BNP was at 28,010 had come down to 13,000 on admission today. Chest x-ray was noted for right upper lobe infiltrate. She had continued same dose shortness of breath. She reports sick contacts at home. Of note she has increasing breath sounds on the right upper lobe with transmitted sounds. I wonder if this could rather be pneumonia and less likely fluid overload at this time.       Active Hospital Problems    Diagnosis Date Noted    Pneumonia involving right lung [J18.9] 02/11/2023     Priority: Medium    Dyspnea [R06.00] 02/10/2023     Priority: Medium    Coronary artery disease involving native heart without angina pectoris, unspecified vessel or lesion type [I25.10] 10/21/2022     Priority: Medium    Chronic renal failure, stage 3b ([facility] [place] [address] [N18.32] 06/28/2022     Priority: Medium    PAF (paroxysmal atrial fibrillation) ([facility]) [I48.0]     HTN (hypertension), benign [I10]     COPD (chronic obstructive pulmonary disease) ([facility] [place] [address] [J44.9] 02/14/2013    Acquired hypothyroidism [E03.9]     Mixed hyperlipidemia [E78.2] 05/16/2011    Essential hypertension, benign [I10] 05/16/2011         Principal Problem:    Pneumonia involving right lung: Reports associated cough with scanty production. No fevers or chills. Obtained CT chest this morning to help delineate right upper lobe pathology. No convincing for pneumonia. Noted elevated CRP. - Continue azithromycin and ceftriaxone  - Continue steroids and will switch to oral in a day or 2.  - Continue O2 supplementation and wean off as tolerated      Dyspnea: Recently seen in cardiology clinic for similar presentation and received diuresis with good effect. However remaining symptomatic despite Diuresis including further evaluation. Chest x-ray with right upper lobe infiltrate concerning for pneumonia. - We will obtain chest CT to delineate the pathology  - Inflammatory markers noted  -I am less convinced this is CHF. Noted creatinine bump. Discontinue IV Lasix and continue home torsemide      COPD (chronic obstructive pulmonary disease) ([facility] [address]): Noted wheezing on right upper chest.  Mild exacerbation. Continue steroids and antibiotics. Continue breathing therapy. Active Problems:    Chronic renal failure, stage 3b ([address]): Noted slight creatinine bump. On diuretic. To monitor. Stop Lasix and continue torsemide    Coronary artery disease involving native heart without angina pectoris, unspecified vessel or lesion type: No chest pains. Continue Coreg, clopidogrel, statin    Essential hypertension, benign: Stable on Coreg    Mixed hyperlipidemia: on statin    Acquired hypothyroidism: On thyroid replacement therapy    PAF (paroxysmal atrial fibrillation) ([facility] [address]):  On Coreg      Medications:  Reviewed  Infusion Medications    sodium chloride       Scheduled Medications    furosemide  40 mg IntraVENous Daily    methylPREDNISolone  40 mg IntraVENous Daily    cefTRIAXone (ROCEPHIN) IV  1,000 mg IntraVENous Q24H    azithromycin  500 mg IntraVENous Q24H    carvedilol  25 mg Oral BID WC    clopidogrel  75 mg Oral Daily    famotidine  40 mg Oral BID    levothyroxine  125 mcg Oral Daily    rosuvastatin  5 mg Oral Nightly    topiramate  100 mg Oral BID    torsemide  10 mg Oral Daily    sodium chloride flush  5-40 mL IntraVENous 2 times per day    heparin (porcine)  5,000 Units SubCUTAneous BID     PRN Meds: albuterol sulfate HFA, nitroGLYCERIN, sodium chloride flush, sodium chloride, ondansetron **OR** ondansetron, polyethylene glycol, acetaminophen **OR** acetaminophen      DVT Prophylaxis: Subcut heparin  Diet: ADULT DIET; Regular; Low Sodium (2 gm)  Code Status: Full Code    Dispo: Awaiting clinical improvement    ____________________________________________________________________________    Subjective:   Overnight Events:   Uneventful overnight  Still with cough and wheezing with shortness of breath      Physical Exam:  BP (!) 124/103   Pulse (!) 104   Temp 98.5 °F (36.9 °C) (Temporal)   Resp 18   Ht 5' 3\" (1.6 m)   Wt 109 lb 4.8 oz (49.6 kg)   SpO2 100%   BMI 19.36 kg/m²   General appearance: No apparent distress, appears stated age and cooperative. HEENT: Normocephalic, atraumatic, MMM, No sclera icterus/conjuctival palor  Neck: Supple, no thyromegally. No jugular venous distention. Respiratory: Right chest wheezing with crackles  Cardiovascular: S1/S2 without murmurs, rubs or gallops. RRR  Abdomen: Soft, non-tender, non-distended, bowel sounds present. Musculoskeletal: No clubbing, cyanosis or edema bilaterally. Skin: Skin color, texture, turgor normal.  No rashes or lesions.   Neurologic:  Cranial nerves: II-XII intact, AMAURY, No focal sensory/motor deficits        Intake/Output Summary (Last 24 hours) at 2/11/2023 1742  Last data filed at 2/11/2023 1000  Gross per 24 hour   Intake 120 ml   Output 400 ml   Net -280 ml       Labs:   Recent Labs     02/08/23  2237 02/10/23  1523 02/11/23  0457   WBC 8.0 6.9 5.9   HGB 11.8* 14.1 12.3   HCT 40.2 48.0 40.7    273 246      Recent Labs     02/08/23  2237 02/10/23  1630 02/11/23  0457    142 144   K 4.4 3.5 3.6    106 108   CO2 18* 26 24   BUN 39* 48* 45*   CREATININE 1.6* 1.7* 1.6*   CALCIUM 8.4 9.3 8.6   AST 13*  --   --    ALT 18  --   --    BILITOT 0.3  --   --    ALKPHOS 89  --   --      Recent Labs     02/08/23  2237 02/10/23  1630 02/11/23  0457   TROPONINI 0.04* 0.07* 0.07*       Urinalysis:    Lab Results   Component Value Date/Time    NITRU Negative 02/10/2023 09:07 PM    WBCUA 1 02/10/2023 09:07 PM    BACTERIA None Seen 02/10/2023 09:07 PM    RBCUA 1 02/10/2023 09:07 PM    BLOODU Negative 02/10/2023 09:07 PM    SPECGRAV 1.010 02/10/2023 09:07 PM    GLUCOSEU Negative 02/10/2023 09:07 PM    GLUCOSEU NEGATIVE 12/04/2011 06:40 PM       Radiology:  CT CHEST WO CONTRAST   Final Result   1. Mild-to-moderate patchy ground-glass and ill-defined nodular opacities in   bilateral lungs, worse on the right, mildly increased when compared to   11/11/2022. This appearance is nonspecific and could be related to   multifocal pneumonia, atypical/viral pneumonia, or pulmonary edema. 2.  Tiny right pleural effusion and trace left pleural fluid. XR CHEST PORTABLE   Final Result      Opacities developing in the right upper lobe                 Ruben Bourgeois MD      Please excuse brevity and/or typos. This report was transcribed using voice recognition software. Every effort was made to ensure accuracy, however, inadvertent computerized transcription errors may be present.

## 2023-02-12 LAB
ANION GAP SERPL CALCULATED.3IONS-SCNC: 11 MMOL/L (ref 3–16)
BUN BLDV-MCNC: 56 MG/DL (ref 7–20)
CALCIUM SERPL-MCNC: 8.5 MG/DL (ref 8.3–10.6)
CHLORIDE BLD-SCNC: 109 MMOL/L (ref 99–110)
CO2: 25 MMOL/L (ref 21–32)
CREAT SERPL-MCNC: 1.8 MG/DL (ref 0.6–1.2)
GFR SERPL CREATININE-BSD FRML MDRD: 28 ML/MIN/{1.73_M2}
GLUCOSE BLD-MCNC: 98 MG/DL (ref 70–99)
HCT VFR BLD CALC: 40.3 % (ref 36–48)
HEMOGLOBIN: 12.4 G/DL (ref 12–16)
MCH RBC QN AUTO: 23.1 PG (ref 26–34)
MCHC RBC AUTO-ENTMCNC: 30.8 G/DL (ref 31–36)
MCV RBC AUTO: 75 FL (ref 80–100)
PDW BLD-RTO: 20 % (ref 12.4–15.4)
PLATELET # BLD: 270 K/UL (ref 135–450)
PMV BLD AUTO: 9.9 FL (ref 5–10.5)
POTASSIUM SERPL-SCNC: 3.7 MMOL/L (ref 3.5–5.1)
RBC # BLD: 5.37 M/UL (ref 4–5.2)
SODIUM BLD-SCNC: 145 MMOL/L (ref 136–145)
WBC # BLD: 7.1 K/UL (ref 4–11)

## 2023-02-12 PROCEDURE — 6360000002 HC RX W HCPCS: Performed by: INTERNAL MEDICINE

## 2023-02-12 PROCEDURE — 36415 COLL VENOUS BLD VENIPUNCTURE: CPT

## 2023-02-12 PROCEDURE — 2580000003 HC RX 258: Performed by: FAMILY MEDICINE

## 2023-02-12 PROCEDURE — 6360000002 HC RX W HCPCS: Performed by: FAMILY MEDICINE

## 2023-02-12 PROCEDURE — 6370000000 HC RX 637 (ALT 250 FOR IP): Performed by: FAMILY MEDICINE

## 2023-02-12 PROCEDURE — 2580000003 HC RX 258: Performed by: INTERNAL MEDICINE

## 2023-02-12 PROCEDURE — 1200000000 HC SEMI PRIVATE

## 2023-02-12 PROCEDURE — 85027 COMPLETE CBC AUTOMATED: CPT

## 2023-02-12 PROCEDURE — 80048 BASIC METABOLIC PNL TOTAL CA: CPT

## 2023-02-12 RX ORDER — SODIUM CHLORIDE 9 MG/ML
INJECTION, SOLUTION INTRAVENOUS CONTINUOUS
Status: ACTIVE | OUTPATIENT
Start: 2023-02-12 | End: 2023-02-12

## 2023-02-12 RX ADMIN — CLOPIDOGREL BISULFATE 75 MG: 75 TABLET ORAL at 08:21

## 2023-02-12 RX ADMIN — SODIUM CHLORIDE, PRESERVATIVE FREE 10 ML: 5 INJECTION INTRAVENOUS at 08:21

## 2023-02-12 RX ADMIN — HEPARIN SODIUM 5000 UNITS: 5000 INJECTION INTRAVENOUS; SUBCUTANEOUS at 08:21

## 2023-02-12 RX ADMIN — TOPIRAMATE 100 MG: 100 TABLET, FILM COATED ORAL at 08:21

## 2023-02-12 RX ADMIN — CARVEDILOL 25 MG: 25 TABLET, FILM COATED ORAL at 08:21

## 2023-02-12 RX ADMIN — FAMOTIDINE 40 MG: 20 TABLET, FILM COATED ORAL at 20:14

## 2023-02-12 RX ADMIN — LEVOTHYROXINE SODIUM 125 MCG: 0.03 TABLET ORAL at 06:51

## 2023-02-12 RX ADMIN — HEPARIN SODIUM 5000 UNITS: 5000 INJECTION INTRAVENOUS; SUBCUTANEOUS at 20:14

## 2023-02-12 RX ADMIN — ROSUVASTATIN 5 MG: 10 TABLET, FILM COATED ORAL at 20:14

## 2023-02-12 RX ADMIN — METHYLPREDNISOLONE SODIUM SUCCINATE 40 MG: 40 INJECTION, POWDER, FOR SOLUTION INTRAMUSCULAR; INTRAVENOUS at 08:21

## 2023-02-12 RX ADMIN — CARVEDILOL 25 MG: 25 TABLET, FILM COATED ORAL at 16:41

## 2023-02-12 RX ADMIN — CEFTRIAXONE SODIUM 1000 MG: 1 INJECTION, POWDER, FOR SOLUTION INTRAMUSCULAR; INTRAVENOUS at 10:01

## 2023-02-12 RX ADMIN — TORSEMIDE 10 MG: 20 TABLET ORAL at 08:21

## 2023-02-12 RX ADMIN — SODIUM CHLORIDE, PRESERVATIVE FREE 10 ML: 5 INJECTION INTRAVENOUS at 20:14

## 2023-02-12 RX ADMIN — FAMOTIDINE 40 MG: 20 TABLET, FILM COATED ORAL at 08:21

## 2023-02-12 RX ADMIN — TOPIRAMATE 100 MG: 100 TABLET, FILM COATED ORAL at 20:14

## 2023-02-12 RX ADMIN — AZITHROMYCIN MONOHYDRATE 500 MG: 500 INJECTION, POWDER, LYOPHILIZED, FOR SOLUTION INTRAVENOUS at 10:47

## 2023-02-12 RX ADMIN — SODIUM CHLORIDE: 9 INJECTION, SOLUTION INTRAVENOUS at 10:01

## 2023-02-12 ASSESSMENT — PAIN SCALES - GENERAL: PAINLEVEL_OUTOF10: 0

## 2023-02-12 NOTE — CARE COORDINATION
Discharge Planning Assessment  Rn/SW discharge planner met w/patient/family member to discuss reason for admission, current living situation, and potential needs at the time of discharge. Demographics/Insurance verified Yes    Current type of dwelling:house    Living arrangements: home alone    Level of function/support:independent w/all ADL's    PCP:Brea    DME:FWW that is used as needed only    Active with any community resources/agencies/skilled home care:no services in the home    Medication compliance issues: stated no issues    Financial issues that could impact healthcare: stated no issues    Transportation at time of d/c (Discussed w/pt/family that on the day of discharge home, tentative time of discharge will be between 10am and noon): family/friends    Discussed and provided facilities of choice if transition to a skilled nursing facility is required a the time of discharge-na. Tentative discharge plan: Met w/pt to determine any potential needs on dc-no needs anticipated at this time. CM following.     Electronically signed by BRIANNA Luis Cera  [phone]

## 2023-02-12 NOTE — PROGRESS NOTES
Shift assessments completed and documented. Scheduled medications administered as ordered. VSS. Care plan and education reviewed with patient. Patient acknowledges understanding. Patient reports all care needs are met at this time. Will continue to monitor.

## 2023-02-12 NOTE — PROGRESS NOTES
Hospitalist Progress Note      PCP: Kim Castaneda MD    Date of Admission: 2/10/2023    LOS: 2    Chief Complaint:   Chief Complaint   Patient presents with    Congestive Heart Failure     CHF, congestion, weakness, fatigue, daughter states she needs to be admitted for CHF       Case Summary:   80-year-old lady with history of atrial fibrillation, CAD, hypertension, upper lipidemia, hypothyroidism, CHF, status post Watchman procedure, history of pulm pacemaker who presented with generalized malaise and fatigue with shortness of breath, chest congestion. Patient was seen in the cardiology clinic a day prior to presentation and noted to have included overload at which time she received 120 mg of Lasix with good diuresis. At that time BNP was at 28,010 had come down to 13,000 on admission today. Chest x-ray was noted for right upper lobe infiltrate. She had continued same dose shortness of breath. She reports sick contacts at home. Of note she has increasing breath sounds on the right upper lobe with transmitted sounds. I wonder if this could rather be pneumonia and less likely fluid overload at this time.       Active Hospital Problems    Diagnosis Date Noted    Pneumonia involving right lung [J18.9] 02/11/2023     Priority: Medium    Dyspnea [R06.00] 02/10/2023     Priority: Medium    Coronary artery disease involving native heart without angina pectoris, unspecified vessel or lesion type [I25.10] 10/21/2022     Priority: Medium    Chronic renal failure, stage 3b ([facility] [address] [N18.32] 06/28/2022     Priority: Medium    PAF (paroxysmal atrial fibrillation) ([facility]) [I48.0]     HTN (hypertension), benign [I10]     COPD (chronic obstructive pulmonary disease) ([facility] [address] [J44.9] 02/14/2013    Acquired hypothyroidism [E03.9]     Mixed hyperlipidemia [E78.2] 05/16/2011    Essential hypertension, benign [I10] 05/16/2011         Principal Problem:    Pneumonia involving right lung: Reports associated cough with scanty production. No fevers or chills. CT chest noted for right upper lobe infiltrate suggestive of pneumonia. Elevated CRP. Procalcitonin 0.15. Suspicious for pneumonia. - Continue antibiotics  - We will add Mucinex  - Continue steroids and will switch to oral today  - O2 supplementation and wean off as tolerated with target sats greater than 90%      Dyspnea: Recently seen in cardiology clinic for similar presentation and received diuresis with good effect. However remaining symptomatic despite Diuresis including further evaluation. Chest x-ray with right upper lobe infiltrate concerning for pneumonia. Reports symptom improvement today. Noted creatinine bumped today to 1.8. I suspect intravascular depletion given recent high dose diuretic prior to admission.  - We will hold torsemide today  - Monitor renal function  - I suspect her symptoms to be related to the lung consolidation.  - We will obtain chest CT to delineate the pathology      COPD (chronic obstructive pulmonary disease) ([facility] [address]): Noted wheezing on right upper chest.  Mild exacerbation. Continue steroids and antibiotics. Continue breathing therapy. Active Problems:    Chronic renal failure, stage 3b ([facility] [address]): Noted slight creatinine bump. On diuretic. To monitor. Stop Lasix and continue torsemide    Coronary artery disease involving native heart without angina pectoris, unspecified vessel or lesion type: No chest pains. Continue Coreg, clopidogrel, statin    Essential hypertension, benign: Stable on Coreg    Mixed hyperlipidemia: on statin    Acquired hypothyroidism: On thyroid replacement therapy    PAF (paroxysmal atrial fibrillation) ([facility] [address]):  On Coreg      Medications:  Reviewed  Infusion Medications    sodium chloride 5 mL/hr at 02/11/23 9378     Scheduled Medications    cefTRIAXone (ROCEPHIN) IV  1,000 mg IntraVENous Q24H    azithromycin  500 mg IntraVENous Q24H    methylPREDNISolone  40 mg IntraVENous Daily    carvedilol  25 mg Oral BID WC clopidogrel  75 mg Oral Daily    famotidine  40 mg Oral BID    levothyroxine  125 mcg Oral Daily    rosuvastatin  5 mg Oral Nightly    topiramate  100 mg Oral BID    [Held by provider] torsemide  10 mg Oral Daily    sodium chloride flush  5-40 mL IntraVENous 2 times per day    heparin (porcine)  5,000 Units SubCUTAneous BID     PRN Meds: albuterol sulfate HFA, nitroGLYCERIN, sodium chloride flush, sodium chloride, ondansetron **OR** ondansetron, polyethylene glycol, acetaminophen **OR** acetaminophen      DVT Prophylaxis: Subcut heparin  Diet: ADULT DIET; Regular; Low Sodium (2 gm)  Code Status: Full Code    Dispo: Awaiting clinical improvement    ____________________________________________________________________________    Subjective:   Overnight Events:   Uneventful overnight  Improved wheezing this morning      Physical Exam:  BP (!) 129/53   Pulse 78   Temp 98.6 °F (37 °C) (Temporal)   Resp 26   Ht 5' 3\" (1.6 m)   Wt 108 lb 0.4 oz (49 kg)   SpO2 98%   BMI 19.14 kg/m²   General appearance: No apparent distress, appears stated age and cooperative. HEENT: Normocephalic, atraumatic, MMM, No sclera icterus/conjuctival palor  Neck: Supple, no thyromegally. No jugular venous distention. Respiratory: Still with right-sided crackles and rhonchi  Cardiovascular: S1/S2 without murmurs, rubs or gallops. RRR  Abdomen: Soft, non-tender, non-distended, bowel sounds present. Musculoskeletal: No clubbing, cyanosis or edema bilaterally. Skin: Skin color, texture, turgor normal.  No rashes or lesions.   Neurologic:  Cranial nerves: II-XII intact, AMAURY, No focal sensory/motor deficits        Intake/Output Summary (Last 24 hours) at 2/12/2023 1539  Last data filed at 2/12/2023 1220  Gross per 24 hour   Intake 780 ml   Output 400 ml   Net 380 ml         Labs:   Recent Labs     02/10/23  1523 02/11/23  0457 02/12/23  0421   WBC 6.9 5.9 7.1   HGB 14.1 12.3 12.4   HCT 48.0 40.7 40.3    246 270        Recent Labs     02/10/23  1630 02/11/23  0457 02/12/23  0421    144 145   K 3.5 3.6 3.7    108 109   CO2 26 24 25   BUN 48* 45* 56*   CREATININE 1.7* 1.6* 1.8*   CALCIUM 9.3 8.6 8.5       Recent Labs     02/10/23  1630 02/11/23  0457   TROPONINI 0.07* 0.07*         Urinalysis:    Lab Results   Component Value Date/Time    NITRU Negative 02/10/2023 09:07 PM    WBCUA 1 02/10/2023 09:07 PM    BACTERIA None Seen 02/10/2023 09:07 PM    RBCUA 1 02/10/2023 09:07 PM    BLOODU Negative 02/10/2023 09:07 PM    SPECGRAV 1.010 02/10/2023 09:07 PM    GLUCOSEU Negative 02/10/2023 09:07 PM    GLUCOSEU NEGATIVE 12/04/2011 06:40 PM       Radiology:  CT CHEST WO CONTRAST   Final Result   1. Mild-to-moderate patchy ground-glass and ill-defined nodular opacities in   bilateral lungs, worse on the right, mildly increased when compared to   11/11/2022. This appearance is nonspecific and could be related to   multifocal pneumonia, atypical/viral pneumonia, or pulmonary edema. 2.  Tiny right pleural effusion and trace left pleural fluid. XR CHEST PORTABLE   Final Result      Opacities developing in the right upper lobe                 Ruben Verma MD      Please excuse brevity and/or typos. This report was transcribed using voice recognition software. Every effort was made to ensure accuracy, however, inadvertent computerized transcription errors may be present.

## 2023-02-12 NOTE — FLOWSHEET NOTE
02/11/23 2156   Assessment   Charting Type Shift assessment   Psychosocial   Psychosocial (WDL) WDL   Neurological   Neuro (WDL) WDL   Level of Consciousness 0   [place] Coma Scale   Eye Opening 4   Best Verbal Response 5   Best Motor Response 6   [place] Coma Scale Score 15   HEENT (Head, Ears, Eyes, Nose, & Throat)   HEENT (WDL) WDL   Respiratory   Respiratory (WDL) X   Respiratory Pattern Regular   Respiratory Depth Normal   Respiratory Quality/Effort Unlabored   Chest Assessment Chest expansion symmetrical   L Breath Sounds Rhonchi;Diminished   R Breath Sounds Rhonchi;Diminished   Subcutaneous Air/Crepitus None   Breath Sounds   Right Upper Lobe Rhonchi   Right Middle Lobe Rhonchi;Diminished   Right Lower Lobe Diminished   Left Upper Lobe Rhonchi   Left Lower Lobe Rhonchi;Diminished   Cardiac   Cardiac (WDL) X   Cardiac Rhythm Atrial fib  (A fib)   Cardiac Monitor   Alarm Audible Yes   Gastrointestinal   Abdominal (WDL) WDL   Genitourinary   Genitourinary (WDL) WDL   Suprapubic Tenderness No   Dysuria (Pain/Burning w/Urination) No   Urine Assessment   Urine Color Yellow/straw   Urine Appearance Clear   Peripheral Vascular   Peripheral Vascular (WDL) WDL   Edema None   Skin Integumentary    Skin Integumentary (WDL) WDL   Musculoskeletal   Musculoskeletal (WDL) X   RL Extremity Weakness   LL Extremity Weakness

## 2023-02-12 NOTE — PROGRESS NOTES
VSS, on RA, patient sitting up in chair, patient denies any needs at this time. Family by bedside. Call light in reach . Discussed patient's plan of care with MD. Will continue to monitor.

## 2023-02-13 LAB
ANION GAP SERPL CALCULATED.3IONS-SCNC: 10 MMOL/L (ref 3–16)
BUN BLDV-MCNC: 60 MG/DL (ref 7–20)
CALCIUM SERPL-MCNC: 8.2 MG/DL (ref 8.3–10.6)
CHLORIDE BLD-SCNC: 110 MMOL/L (ref 99–110)
CO2: 22 MMOL/L (ref 21–32)
CREAT SERPL-MCNC: 1.7 MG/DL (ref 0.6–1.2)
GFR SERPL CREATININE-BSD FRML MDRD: 30 ML/MIN/{1.73_M2}
GLUCOSE BLD-MCNC: 95 MG/DL (ref 70–99)
HCT VFR BLD CALC: 38.8 % (ref 36–48)
HEMOGLOBIN: 11.7 G/DL (ref 12–16)
MCH RBC QN AUTO: 22.9 PG (ref 26–34)
MCHC RBC AUTO-ENTMCNC: 30.2 G/DL (ref 31–36)
MCV RBC AUTO: 75.9 FL (ref 80–100)
PDW BLD-RTO: 20.2 % (ref 12.4–15.4)
PLATELET # BLD: 236 K/UL (ref 135–450)
PMV BLD AUTO: 9.8 FL (ref 5–10.5)
POTASSIUM SERPL-SCNC: 3.2 MMOL/L (ref 3.5–5.1)
RBC # BLD: 5.11 M/UL (ref 4–5.2)
SODIUM BLD-SCNC: 142 MMOL/L (ref 136–145)
WBC # BLD: 7.2 K/UL (ref 4–11)

## 2023-02-13 PROCEDURE — 6370000000 HC RX 637 (ALT 250 FOR IP): Performed by: INTERNAL MEDICINE

## 2023-02-13 PROCEDURE — 97162 PT EVAL MOD COMPLEX 30 MIN: CPT

## 2023-02-13 PROCEDURE — 97530 THERAPEUTIC ACTIVITIES: CPT

## 2023-02-13 PROCEDURE — 36415 COLL VENOUS BLD VENIPUNCTURE: CPT

## 2023-02-13 PROCEDURE — 97166 OT EVAL MOD COMPLEX 45 MIN: CPT

## 2023-02-13 PROCEDURE — 2580000003 HC RX 258: Performed by: INTERNAL MEDICINE

## 2023-02-13 PROCEDURE — 6370000000 HC RX 637 (ALT 250 FOR IP): Performed by: FAMILY MEDICINE

## 2023-02-13 PROCEDURE — 6360000002 HC RX W HCPCS: Performed by: INTERNAL MEDICINE

## 2023-02-13 PROCEDURE — 97116 GAIT TRAINING THERAPY: CPT

## 2023-02-13 PROCEDURE — 97535 SELF CARE MNGMENT TRAINING: CPT

## 2023-02-13 PROCEDURE — 92526 ORAL FUNCTION THERAPY: CPT

## 2023-02-13 PROCEDURE — 2580000003 HC RX 258: Performed by: FAMILY MEDICINE

## 2023-02-13 PROCEDURE — 85027 COMPLETE CBC AUTOMATED: CPT

## 2023-02-13 PROCEDURE — 80048 BASIC METABOLIC PNL TOTAL CA: CPT

## 2023-02-13 PROCEDURE — 6360000002 HC RX W HCPCS: Performed by: FAMILY MEDICINE

## 2023-02-13 PROCEDURE — 1200000000 HC SEMI PRIVATE

## 2023-02-13 RX ORDER — POTASSIUM CHLORIDE 20 MEQ/1
40 TABLET, EXTENDED RELEASE ORAL ONCE
Status: COMPLETED | OUTPATIENT
Start: 2023-02-13 | End: 2023-02-13

## 2023-02-13 RX ORDER — LANOLIN ALCOHOL/MO/W.PET/CERES
3 CREAM (GRAM) TOPICAL NIGHTLY PRN
Status: DISCONTINUED | OUTPATIENT
Start: 2023-02-13 | End: 2023-02-15 | Stop reason: HOSPADM

## 2023-02-13 RX ORDER — HYDROCODONE BITARTRATE AND ACETAMINOPHEN 5; 325 MG/1; MG/1
1 TABLET ORAL EVERY 6 HOURS PRN
Status: DISCONTINUED | OUTPATIENT
Start: 2023-02-13 | End: 2023-02-15 | Stop reason: HOSPADM

## 2023-02-13 RX ORDER — AZITHROMYCIN 250 MG/1
250 TABLET, FILM COATED ORAL DAILY
Qty: 3 TABLET | Refills: 0 | Status: SHIPPED | OUTPATIENT
Start: 2023-02-13 | End: 2023-02-16

## 2023-02-13 RX ADMIN — SODIUM CHLORIDE, PRESERVATIVE FREE 10 ML: 5 INJECTION INTRAVENOUS at 20:47

## 2023-02-13 RX ADMIN — CARVEDILOL 25 MG: 25 TABLET, FILM COATED ORAL at 18:30

## 2023-02-13 RX ADMIN — HEPARIN SODIUM 5000 UNITS: 5000 INJECTION INTRAVENOUS; SUBCUTANEOUS at 08:45

## 2023-02-13 RX ADMIN — CARVEDILOL 25 MG: 25 TABLET, FILM COATED ORAL at 08:35

## 2023-02-13 RX ADMIN — POTASSIUM CHLORIDE 40 MEQ: 1500 TABLET, EXTENDED RELEASE ORAL at 15:11

## 2023-02-13 RX ADMIN — TOPIRAMATE 100 MG: 100 TABLET, FILM COATED ORAL at 08:35

## 2023-02-13 RX ADMIN — CEFTRIAXONE SODIUM 1000 MG: 1 INJECTION, POWDER, FOR SOLUTION INTRAMUSCULAR; INTRAVENOUS at 08:46

## 2023-02-13 RX ADMIN — PREDNISONE 30 MG: 20 TABLET ORAL at 08:35

## 2023-02-13 RX ADMIN — LEVOTHYROXINE SODIUM 125 MCG: 0.03 TABLET ORAL at 05:13

## 2023-02-13 RX ADMIN — ROSUVASTATIN 5 MG: 10 TABLET, FILM COATED ORAL at 20:46

## 2023-02-13 RX ADMIN — FAMOTIDINE 40 MG: 20 TABLET, FILM COATED ORAL at 20:46

## 2023-02-13 RX ADMIN — SODIUM CHLORIDE, PRESERVATIVE FREE 10 ML: 5 INJECTION INTRAVENOUS at 08:51

## 2023-02-13 RX ADMIN — HEPARIN SODIUM 5000 UNITS: 5000 INJECTION INTRAVENOUS; SUBCUTANEOUS at 20:47

## 2023-02-13 RX ADMIN — FAMOTIDINE 40 MG: 20 TABLET, FILM COATED ORAL at 08:35

## 2023-02-13 RX ADMIN — CLOPIDOGREL BISULFATE 75 MG: 75 TABLET ORAL at 08:35

## 2023-02-13 RX ADMIN — TOPIRAMATE 100 MG: 100 TABLET, FILM COATED ORAL at 20:46

## 2023-02-13 RX ADMIN — AZITHROMYCIN MONOHYDRATE 500 MG: 500 INJECTION, POWDER, LYOPHILIZED, FOR SOLUTION INTRAVENOUS at 09:54

## 2023-02-13 ASSESSMENT — PAIN DESCRIPTION - LOCATION: LOCATION: ARM;HIP

## 2023-02-13 ASSESSMENT — PAIN DESCRIPTION - PAIN TYPE: TYPE: CHRONIC PAIN

## 2023-02-13 ASSESSMENT — PAIN DESCRIPTION - ORIENTATION: ORIENTATION: RIGHT

## 2023-02-13 ASSESSMENT — PAIN DESCRIPTION - DESCRIPTORS: DESCRIPTORS: ACHING

## 2023-02-13 ASSESSMENT — PAIN SCALES - GENERAL: PAINLEVEL_OUTOF10: 8

## 2023-02-13 NOTE — CARE COORDINATION
Discharge Planning:     (PERICO) was advised by the Patient's nurse that PT and OT are recommending a SNF. Nurse advised the Patient and daughter are in agreement with SNF. CM provided a SNF list for the Patient's insurance and was asked to send referrals to [facility] as they are close. Daughter to review list and advise if there are any other places she would like referrals too. CM sent referrals to [facility] and [facility] via [employer]. CM team to follow. Electronically signed by BRIANNA Bah on 2/13/2023 at 2:00 PM      UPDATE AT 1520:    PERICO called Kayla with [facility] to check on the referral she sent over. PERICO LVM with her call back information. PERICO also called [place] with [facility] to check on the referral. PERICO LVM with her call back information. CM perfectserved Dr. Erika Ospina this information.      Electronically signed by BRIANNA Bah on 2/13/2023 at 3:25 PM

## 2023-02-13 NOTE — PROGRESS NOTES
[facility]   Phone: [phone]    Occupational Therapy    [x] Initial Evaluation            [] Daily Treatment Note         [] Discharge Summary      Patient: Abbe Kawasaki   : 1940   MRN: [id number]   Date of Service:  2023    Admitting Diagnosis:  Pneumonia involving right lung  Current Admission Summary: 80-year-old lady with history of atrial fibrillation, CAD, hypertension, upper lipidemia, hypothyroidism, CHF, status post Watchman procedure, history of pulm pacemaker who presented with generalized malaise and fatigue with shortness of breath, chest congestion  Past Medical History:  has a past medical history of Allergic rhinitis, cause unspecified, Arthritis, Atrial fibrillation ([address] Bronchopneumonia, CAD (coronary artery disease), Cerebral artery occlusion with cerebral infarction ([address] Chronic gouty arthropathy, Chronic kidney disease, Congestive heart failure, unspecified, Degeneration of cervical intervertebral disc, Essential and other specified forms of tremor, Gout, HIGH CHOLESTEROL, History of CVA (cerebrovascular accident), Hx of blood clots, Hypertension, Influenza, Leakage of Watchman left atrial appendage closure device, Mitral valve stenosis and aortic valve insufficiency, Movement disorder, Pacemaker, Peptic ulcer, unspecified site, unspecified as acute or chronic, without mention of hemorrhage, perforation, or obstruction, Thyroid disease, Unspecified disorder of kidney and ureter, and Unspecified hypothyroidism. Past Surgical History:  has a past surgical history that includes back surgery; Cholecystectomy; Cardiac surgery; Coronary artery bypass graft (); shoulder surgery; Cardiac catheterization (2012); hip surgery (Left, 2017); joint replacement; Cardiac catheterization (2018);  Percutaneous Transluminal Coronary Angio (2014); pr njx aa&/strd tfrml epi lumbar/sacral 1 level (Right, 12/3/2018); Femoral-femoral Bypass Graft (N/A, 8/22/2019); femoral bypass (Left, 8/22/2019); and IR KYPHOPLASTY LUMBAR 1 VERTEBRAL BODY (5/14/2021). Discharge Recommendations: Kevin Vincent scored a 17/24 on the AM-PAC ADL Inpatient form. Current research shows that an AM-PAC score of 17 or less is typically not associated with a discharge to the patient's home setting. Based on the patient's AM-PAC score and their current ADL deficits, it is recommended that the patient have 3-5 sessions per week of Occupational Therapy at d/c to increase the patient's independence. Please see assessment section for further patient specific details. If patient discharges prior to next session this note will serve as a discharge summary. Please see below for the latest assessment towards goals. DME Required For Discharge: DME to be determined at next level of care    Precautions/Restrictions: high fall risk    Pre-Admission Information   Lives With: daughter               Type of Home: house  Home Layout: one level, with basement (pt does not need to go down to basement)  Home Access:  2 step to enter without rails Someone is always with her to assist with the 2 steps. Bathroom Layout: walk in shower  Bathroom Equipment: grab bars in shower, shower chair, hand held shower head  Toilet Height: standard height  Home Equipment: rolling walker, rollator - 4 wheeled walker, single point cane, electric scooter  Transfer Assistance: Independent without use of device  Ambulation Assistance:Independent without use of device Pt reports if she is feeling \"bad\", she will use walker or cane. Scooter in community. ADL Assistance: independent with all ADL's  IADL Assistance:  Pt does own laundry. Pt can do some cleaning on her own, pt reports she does a lot of her cleaning. Active :        [] Yes                 [x] No Daughter drives pt. Hand Dominance: [] Left                 [x] Right  Current Employment: retired.   Occupation: [employer]   Hobbies: Hanging out with dog; TV;  Recent Falls: Pt had a recent fall prior to hospital admission when adjusting recliner. Pt reports no falls recently. Pt will discharge home with daughter; therefore, daughter's home is described above. Daughter does not work, she is able to remain at home. Pt sleeps in flat bed. Examination   Vision:   Vision Gross Assessment: WFL  Hearing:   hard of hearing  Sensation:   reports numbness and tingling in (B) LE  ROM:   (B) UE AROM WFL  Strength:   (B) UE strength grossly WFL    Therapist Clinical Decision Making (Complexity): medium complexity  Clinical Presentation: stable      Subjective  General: Pt seated in recliner upon arrival, agreeable to evaluation  Pain: 0/10  Pain Interventions: not applicable        Activities of Daily Living  Basic Activities of Daily Living  Grooming: stand by assistance  Upper Extremity Bathing: stand by assistance  Lower Extremity Bathing: contact guard assistance   Upper Extremity Dressing: contact guard assistance  Instrumental Activities of Daily Living  No IADL completed on this date. Functional Mobility  Bed Mobility  Bed mobility not completed on this date. Comments:  Transfers  Sit to stand transfer:contact guard assistance  Stand to sit transfer: contact guard assistance  Comments:  Functional Mobility:  Sitting Balance: stand by assistance. Standing Balance: contact guard assistance.     Functional Mobility: .  contact guard assistance  Functional Mobility Activity: hallway ambulation, initially w/o device and CGA with very slow maximilian, provided with RW and able to ambulated with better maximilian and CGA  Functional Mobility Device Use: rolling walker  Functional Mobility Comment: Stair negotiation (2 total) with min+ mod to descend, min x2 to ascend    Other Therapeutic Interventions    Functional Outcomes  AM-PAC Inpatient Daily Activity Raw Score: 17    Cognition  Overall Cognitive Status: Impaired  Arousal/Alterness: inconsistent responses to stimuli  Following Commands: follows one step commands with repetition, follows one step commands with increased time  Attention Span: attends with cues to redirect  Memory: decreased recall of recent events, decreased short term memory  Safety Judgement: decreased awareness of need for safety  Problem Solving: assistance required to identify errors made, assistance required to correct errors made  Insights: decreased awareness of deficits  Initiation: requires cues for some  Sequencing: requires cues for some  Orientation:    oriented to person, oriented to place, and oriented to situation  Command Following:   accurately follows one step commands     Education  Barriers To Learning: cognition  Patient Education: patient educated on goals, OT role and benefits, plan of care, ADL adaptive strategies, transfer training, discharge recommendations  Learning Assessment:  patient verbalizes understanding, would benefit from continued reinforcement    Assessment  Activity Tolerance: Tolerated fair, limited d/t fatigue/weakness  Impairments Requiring Therapeutic Intervention: decreased functional mobility, decreased ADL status, decreased strength, decreased safety awareness, decreased cognition, decreased endurance, decreased balance, decreased IADL  Prognosis: good  Clinical Assessment: The patient is a 82 y.o. female who presents below their baseline level of function due to above deficits, associated with PNA. Typically, pt is independent for ADL and mobility. Currently, pt is requiring CGA for mobility with RW and assist of two for stairs. Continued OT indicated in order to promote return to PLOF    Safety Interventions: patient left in chair, chair alarm in place, call light within reach, gait belt, nurse notified, and family/caregiver present    Plan  Frequency: 3-5 x/per week  Current Treatment Recommendations: strengthening, balance training, functional  mobility training, transfer training, endurance training, patient/caregiver education, ADL/self-care training, IADL training, and equipment evaluation/education    Goals    Short Term Goals:  Time Frame: by discharge  Patient will complete upper body ADL at supervision   Patient will complete lower body ADL at stand by assistance   Patient will complete toileting at stand by assistance   Patient will complete grooming at supervision   Patient will complete functional transfers at stand by assistance   Patient will complete functional mobility at stand by assistance     Therapy Session Time     Individual Group Co-treatment   Time In    1305   Time Out    1406   Minutes    61        Timed Code Treatment Minutes:   46  Total Treatment Minutes:  61 minutes       Electronically Signed By: Karolina Shields, OT    Karolina Shields, [address] [facility] OTR/L [id number]

## 2023-02-13 NOTE — PROGRESS NOTES
Facility/Department: [address]  Speech Language Pathology   Dysphagia Treatment Note    Patient: Paola Christopher   : 1940   MRN: [id number]      Evaluation Date: 2023      Admitting Dx: Acute pulmonary edema ([address] [J81.0]  Dyspnea [R06.00]  Elevated serum creatinine [R79.89]  Elevated troponin [R77.8]  Pneumonia due to infectious organism, unspecified laterality, unspecified part of lung [J18.9]  Treatment Diagnosis: Oropharyngeal Dysphagia   Pain: Did not state                                              Diet and Treatment Recommendations 2023:  Diet Solids Recommendation:  Easy to chew  Liquid Consistency Recommendation: Thin liquids  Recommended form of Meds: Meds whole with water or Meds in puree         Compensatory strategies:   Upright as possible with all PO intake , Small bites/sips , Eat/feed slowly, Remain upright 30-45 min     Assessment of Texture Tolerance:  Diet level prior to treatment: Regular texture diet , Thin liquids   Tolerance of Current Diet Level:RN reported pt appears to be tolerating current diet level     Impressions: Pt was positioned Upright in chair, awake and alert\ with daughter present during evaluation. Currently on room air. Trials of thin liquids and regular solids  were provided to assess swallow function. Pt demonstrated prolonged mastication of regular solids, reporting difficulty with masticating sticky, hard, fibrous textures. She was able to achieve adequate oral clearance given extended timing and use of liquid wash. No overt clinical s/s of aspiration assessed with trials of thin liquids via straw. Pt demonstrated an intermittent cough, notable prior to and during trials. Unable to discern baseline cough vs s/s of aspiration therefore recommend use of compensatory strategies to maximize safety. Overall, pt demonstrates increased risk for aspiration due to respiratory status .  Based on today's assessment recommend Easy to chew  with Thin liquids , Meds whole with water or Meds in puree . Dysphagia Goals:   Pt will functionally tolerate recommended diet with no overt clinical s/s of aspiration (Ongoing 02/13/23)  Pt will demonstrate understanding of aspiration risk and precautions via education/demonstration with occasional prompting (Ongoing 02/13/23)  If clinical s/s of aspiration/penetration continue to be noted, Pt will participate in Modified Barium Swallow Study (Ongoing 02/13/23)    Plan:   3-5 times per week during acute care stay. Patient/Family Education:  Provided education regarding role of SLP, recommendations and general speech pathology plan of care. [x] Pt verbalized understanding and agreement   [] Pt requires ongoing learning   [] No evidence of comprehension     Discharge Recommendations:    Discharge recommendations to be determined pending ongoing follow-up during acute care stay    Treatment time:  Timed Code Treatment Minutes: 0  Total Treatment time: 13 min    If patient discharges prior to next session this note will serve as a discharge summary.        Signature:   Bhavya Bruno M.A., [address] [facility] [address]  Speech-Language Pathologist

## 2023-02-13 NOTE — CONSULTS
MD Dinorah Boyce MD Tery Du, MD                  Office: [phone]                      Fax: [phone]             [facility]                   NEPHROLOGY INITIAL CONSULT NOTE:     PATIENT NAME: Paola Christopher  : 1940  MRN: [id number]  REASON FOR CONSULT: For evaluation and management of Acute Kidney Injury .  (My recommendations will be communicated by way of shared medical record.)      RECOMMENDATIONS:   -Okay to discharge home today,  -Resume torsemide 10 mg a day, and as needed also  -Low-dose K repletion*   -Pneumonia management per primary team    -Most of her respiratory symptoms are likely from her underlying lung disease, she has not seen a lung doctor in the past, so I suggested to follow up w/ pulmonologist here.      -I have placed outpatient referral for pulmonary and updated follow-up provider for d/c.    -Overall prognosis guarded, with cachexia,    Discussed with patient, her daughter, primary team, Dr. William Kwon,    IMPRESSION:       Admitted on:  2/10/2023  2:59 PM   For:  Acute pulmonary edema ([facility] [J81.0]  Dyspnea [R06.00]  Elevated serum creatinine [R79.89]  Elevated troponin [R77.8]  Pneumonia due to infectious organism, unspecified laterality, unspecified part of lung [J18.9]          GIANA (on nonproteinuric CKD: stage 3A):   - BL Scr-most recently 1.2-1.4 as of 12--> this admission peaked at 1.8  - Etiology of GIANA -prerenal  - other differentials: Unlikely a DNR GN / TI / TMA process  - UA : results reviewed: :       Associated problems:      Chronic-systolic heart failure-   Weight in past around 116-119 pounds,   Lowest 113 lbs  -now this admission 111,  pounds, likely cachexia,  -BMI 19          S/P [facility] [address] Светлана Krause   IV contrast 61 cc  No LVEDP check          on CT -  A few subcentimeter hyperdense nodules are seen in  the right and left kidney, incompletely characterized on noncontrast study,  most likely hemorrhagic or proteinaceous cyst.  Simple appearing cysts are  seen in the right and left kidney   ->> Urology was consulted in past     Other major problems: Management per primary and other consulting teams. Hospital Problems             Last Modified POA    * (Principal) Pneumonia involving right lung 2/11/2023 Yes    Chronic renal failure, stage 3b ([facility] 2/11/2023 Yes    Coronary artery disease involving native heart without angina pectoris, unspecified vessel or lesion type 2/11/2023 Yes    Dyspnea 2/11/2023 Yes    Essential hypertension, benign 2/11/2023 Yes    Mixed hyperlipidemia (Chronic) 2/11/2023 Yes    Acquired hypothyroidism 2/11/2023 Yes    COPD (chronic obstructive pulmonary disease) ([facility] 2/11/2023 Yes    HTN (hypertension), benign 2/11/2023 Yes    PAF (paroxysmal atrial fibrillation) ([facility] 2/11/2023 Yes         : other supportive care :   - Check daily renal function panel with electrolytes-phosphorus  - Strict monitoring of I/Os, daily weight  - Renal feeds/diet  - Current medications reviewed. - Nephrotoxic medications have been discontinued. - Dose adjusted and appropriate. - Dose meds for eGFR <15 mL/min/1.73m2 during GIANA    - Avoid heavy opioids due to renal failure - may use very low dose dilaudid / fentanyl with close monitoring of CNS and respiratory depression. Please refer to the orders. High Complexity. Multiple complex problems. Discussed with patient and treatment team-    Time spent > 30 (~35) minutes. Thank you for allowing me to participate in this patient's care. Please do not hesitate to contact me anytime. We will follow along with you.        Rand Scott MD,  [facility] [address] [place] [address]: [phone] or [address] [employer]  Fax: [phone]        =======================================================================================   =======================================================================================      CHIEF COMPLAINT: Chief Complaint   Patient presents with    Congestive Heart Failure     CHF, congestion, weakness, fatigue, daughter states she needs to be admitted for CHF     History Obtained From:  patient, family member - daughter  + treatment team + Electronic Medical Records    HPI: Ms. Mojgan Garcia is a 80 y.o. female with significant past medical history as below:   Past Medical History:   Diagnosis Date    Allergic rhinitis, cause unspecified     Arthritis     Atrial fibrillation ([facility]     Bronchopneumonia     CAD (coronary artery disease)     stent:  post cataract surgery (CABG)    Cerebral artery occlusion with cerebral infarction [facility])     TIA    Chronic gouty arthropathy     Chronic kidney disease     Congestive heart failure, unspecified     Degeneration of cervical intervertebral disc     Essential and other specified forms of tremor     Gout     HIGH CHOLESTEROL     History of CVA (cerebrovascular accident)     Hx of blood clots     Hypertension     Influenza 12/23/2017    Leakage of Watchman left atrial appendage closure device     Mitral valve stenosis and aortic valve insufficiency     Movement disorder     back problems    Pacemaker     Peptic ulcer, unspecified site, unspecified as acute or chronic, without mention of hemorrhage, perforation, or obstruction     Thyroid disease     Unspecified disorder of kidney and ureter     Unspecified hypothyroidism       Presents with Congestive Heart Failure (CHF, congestion, weakness, fatigue, daughter states she needs to be admitted for CHF)    Admitted with Acute pulmonary edema ([facility] [J81.0]  Dyspnea [R06.00]  Elevated serum creatinine [R79.89]  Elevated troponin [R77.8]  Pneumonia due to infectious organism, unspecified laterality, unspecified part of lung [J18.9]   Found to have elevated Cr , so we are called for that. No current active complaints. Patient denied chest pain / dizziness/lightheadedness/syncope/ SOB / leg edema.    Dry cough +*  Regarding: GIANA on CKD  Duration: acute on chronic  Location: kidneys  Severity: Severe   Timing: continous  Context (ex: related to condition):  , refer to my assessment / impression. Modifying factors (ex: medications, interventions):  , refer to my plan / recommendation. Associated signs & symptoms (ex: edema, SOB): As mentioned above in CC and HPI    Chart reviewed, patient's pertinent electronic medical records , Medical history and medication reviewed as needed for my evaulation. Past medical, Surgical, Social, Family medical history reviewed by me.      PAST MEDICAL HISTORY:   Past Medical History:   Diagnosis Date    Allergic rhinitis, cause unspecified     Arthritis     Atrial fibrillation (HCC)     Bronchopneumonia     CAD (coronary artery disease)     stent:  post cataract surgery (CABG)    Cerebral artery occlusion with cerebral infarction [facility])     TIA    Chronic gouty arthropathy     Chronic kidney disease     Congestive heart failure, unspecified     Degeneration of cervical intervertebral disc     Essential and other specified forms of tremor     Gout     HIGH CHOLESTEROL     History of CVA (cerebrovascular accident)     Hx of blood clots     Hypertension     Influenza 12/23/2017    Leakage of Watchman left atrial appendage closure device     Mitral valve stenosis and aortic valve insufficiency     Movement disorder     back problems    Pacemaker     Peptic ulcer, unspecified site, unspecified as acute or chronic, without mention of hemorrhage, perforation, or obstruction     Thyroid disease     Unspecified disorder of kidney and ureter     Unspecified hypothyroidism      PAST SURGICAL HISTORY:   Past Surgical History:   Procedure Laterality Date    BACK SURGERY      CARDIAC CATHETERIZATION  7/2012    CARDIAC CATHETERIZATION  08/07/2018    Unsuccesful  of RCA    CARDIAC SURGERY      CABG & Cardiac ablation    CHOLECYSTECTOMY      CORONARY ARTERY BYPASS GRAFT  1987    LIMA- Diag/LAD, SVG- RCA    FEMORAL BYPASS Left 2019    LEFT FEMORAL TO POPLITEAL BYPASS GRAFT performed by Chago Bianchi MD at [facility] GRAFT N/A 2019    FEMORAL TO FEMORAL BYPASS performed by Chago Bianchi MD at [address] Left 2017    Left hip pinning    IR KYPHOPLASTY LUMBAR FIRST LEVEL  2021    IR KYPHOPLASTY LUMBAR FIRST LEVEL 2021 [facility] SPECIAL PROCEDURES    JOINT REPLACEMENT      OK NJX AA&/STRD TFRML EPI LUMBAR/SACRAL 1 LEVEL Right 12/3/2018    RIGHT L3 AND L4 LUMBAR TRANSFORAMINAL EPIRUAL STEROID INJECTION WITH FLUOROSCOPY performed by Amanda Ramirez MD at [address]    PTCA  2014    MARLENY - 3.0 x 28 to the Ist Diag    SHOULDER SURGERY      left     FAMILY HISTORY:   Family History   Problem Relation Age of Onset    Cancer Sister     Heart Disease Sister     Diabetes Brother     Hypertension Brother     Heart Disease Brother     Stroke Maternal Aunt     Heart Disease Maternal Aunt     Diabetes Maternal Uncle     Hypertension Paternal Aunt     Cancer Maternal Grandmother     Cancer Paternal Grandmother     Rheum Arthritis Neg Hx     Lupus Neg Hx      SOCIAL HISTORY:   Social History     Socioeconomic History    Marital status:       Spouse name: Kolby Lopez    Number of children: 3    Years of education: 12    Highest education level: None   Occupational History    Occupation: Retired   Tobacco Use    Smoking status: Former     Packs/day: 1.00     Years: 28.00     Pack years: 28.00     Types: Cigarettes     Quit date: 1987     Years since quittin.1    Smokeless tobacco: Never    Tobacco comments:     H.O.smoking at age 15 / smoked up to 1 p.p.d / quit    Vaping Use    Vaping Use: Never used   Substance and Sexual Activity    Alcohol use: Yes     Comment: social    Drug use: No    Sexual activity: Not Currently     Social Determinants of Health     Financial Resource Strain: Low Risk     Difficulty of Paying Living Expenses: Not hard at all   Food Insecurity: No Food Insecurity    Worried About Running Out of Food in the Last Year: Never true    Ran Out of Food in the Last Year: Never true   Transportation Needs: No Transportation Needs    Lack of Transportation (Medical): No    Lack of Transportation (Non-Medical): No   Physical Activity: Inactive    Days of Exercise per Week: 0 days    Minutes of Exercise per Session: 0 min   Stress: No Stress Concern Present    Feeling of Stress : Not at all   Social Connections: Socially Isolated    Frequency of Communication with Friends and Family: Three times a week    Frequency of Social Gatherings with Friends and Family: Three times a week    Attends Jewish Services: Never    Active Member of Clubs or Organizations: No    Attends Club or Organization Meetings: Never    Marital Status:    Housing Stability: Low Risk     Unable to Pay for Housing in the Last Year: No    Number of Places Lived in the Last Year: 1    Unstable Housing in the Last Year: No         MEDICATIONS: reviewed by me. Medications Prior to Admission:  No current facility-administered medications on file prior to encounter. Current Outpatient Medications on File Prior to Encounter   Medication Sig Dispense Refill    albuterol sulfate HFA (PROVENTIL HFA) 108 (90 Base) MCG/ACT inhaler Inhale 2 puffs into the lungs every 4 hours as needed for Wheezing or Shortness of Breath (Space out to every 6 hours as symptoms improve) Space out to every 6 hours as symptoms improve.  18 g 0    famotidine (PEPCID) 40 MG tablet Take 1 tablet by mouth 2 times daily (Patient not taking: No sig reported) 60 tablet 0    predniSONE (DELTASONE) 10 MG tablet 1 TID for 3 day then 1 BID (Patient not taking: Reported on 2/10/2023) 15 tablet 0    nitroGLYCERIN (NITROLINGUAL) 0.4 MG/SPRAY 0.4 mg spray Place 1 spray under the tongue every 5 minutes as needed for Chest pain 4.9 g 1    torsemide (DEMADEX) 20 MG tablet 10 mg every day except Sunday and as directed for weight gain 60 tablet 1    clopidogrel (PLAVIX) 75 MG tablet Take 1 tablet by mouth daily 90 tablet 2    carvedilol (COREG) 25 MG tablet Take 1 tablet by mouth 2 times daily (with meals) (Patient taking differently: Take 25 mg by mouth 2 times daily) 180 tablet 3    rosuvastatin (CRESTOR) 5 MG tablet Take 1 tablet by mouth nightly 90 tablet 1    levothyroxine (SYNTHROID) 125 MCG tablet Take 1 tablet by mouth Daily 90 tablet 0    aspirin EC 81 MG EC tablet Take 1 tablet by mouth daily 90 tablet 1    topiramate (TOPAMAX) 50 MG tablet TAKE 2 TABLETS TWICE DAILY 360 tablet 0    vitamin D (ERGOCALCIFEROL) 1.25 MG (53130 UT) CAPS capsule TAKE 1 CAPSULE ONCE A WEEK 12 capsule 1    allopurinol (ZYLOPRIM) 100 MG tablet TAKE 1 TABLET EVERY DAY 90 tablet 1    fluticasone (FLONASE) 50 MCG/ACT nasal spray 2 sprays by Each Nostril route daily 16 g 0         Current Facility-Administered Medications:     cefTRIAXone (ROCEPHIN) 1,000 mg in sodium chloride 0.9 % 50 mL IVPB (mini-bag), 1,000 mg, IntraVENous, Q24H, Ruben Valles MD, Stopped at 02/13/23 0932    azithromycin (ZITHROMAX) 500 mg in sodium chloride 0.9 % 250 mL (vial-mate) IVPB, 500 mg, IntraVENous, Q24H, Ruben Valles MD, Stopped at 02/13/23 1104    predniSONE (DELTASONE) tablet 30 mg, 30 mg, Oral, Daily, Ruben Valles MD, 30 mg at 02/13/23 0835    albuterol sulfate HFA (PROVENTIL;VENTOLIN;PROAIR) 108 (90 Base) MCG/ACT inhaler 2 puff, 2 puff, Inhalation, Q4H PRN, Ino Solorzano MD    carvedilol (COREG) tablet 25 mg, 25 mg, Oral, BID WC, Alycia Johnson MD, 25 mg at 02/13/23 0835    clopidogrel (PLAVIX) tablet 75 mg, 75 mg, Oral, Daily, Alycia Johnson MD, 75 mg at 02/13/23 0835    famotidine (PEPCID) tablet 40 mg, 40 mg, Oral, BID, Ino Solorzano MD, 40 mg at 02/13/23 0835    levothyroxine (SYNTHROID) tablet 125 mcg, 125 mcg, Oral, Daily, Alycia Johnson MD, 125 mcg at 02/13/23 0513    nitroGLYCERIN (NITROLINGUAL) 0.4 mg spray 1 spray, 1 spray, SubLINGual, Q5 Min PRN, Alycia Mendel Comber, MD    rosuvastatin (CRESTOR) tablet 5 mg, 5 mg, Oral, Nightly, Beau Ferris MD, 5 mg at 02/12/23 2014    topiramate (TOPAMAX) tablet 100 mg, 100 mg, Oral, BID, Beau Ferris MD, 100 mg at 02/13/23 0835    [Held by provider] torsemide (DEMADEX) tablet 10 mg, 10 mg, Oral, Daily, Beau Ferris MD, 10 mg at 02/12/23 4589    sodium chloride flush 0.9 % injection 5-40 mL, 5-40 mL, IntraVENous, 2 times per day, Beau eFrris MD, 10 mL at 02/13/23 0851    sodium chloride flush 0.9 % injection 5-40 mL, 5-40 mL, IntraVENous, PRN, Beau Ferris MD    0.9 % sodium chloride infusion, , IntraVENous, PRN, Beau Ferris MD, Last Rate: 5 mL/hr at 02/11/23 1759, New Bag at 02/11/23 1759    heparin (porcine) injection 5,000 Units, 5,000 Units, SubCUTAneous, BID, Beau Ferris MD, 5,000 Units at 02/13/23 0845    ondansetron (ZOFRAN-ODT) disintegrating tablet 4 mg, 4 mg, Oral, Q8H PRN **OR** ondansetron (ZOFRAN) injection 4 mg, 4 mg, IntraVENous, Q6H PRN, Beau Ferris MD    polyethylene glycol (GLYCOLAX) packet 17 g, 17 g, Oral, Daily PRN, Beau Ferris MD    acetaminophen (TYLENOL) tablet 650 mg, 650 mg, Oral, Q6H PRN **OR** acetaminophen (TYLENOL) suppository 650 mg, 650 mg, Rectal, Q6H PRN, Beau Ferris MD      Allergies reviewed by me: Aspirin, Diltiazem, Diltiazem hcl, Lorazepam, Sulfa antibiotics, Atorvastatin, Dabigatran, Mysoline [primidone], Nsaids, Other, and Primidone    REVIEW OF SYSTEMS:  As mentioned in chief complaint and HPI , Subjective   All other 10-point review of systems: negative.        =======================================================================================     PHYSICAL EXAM:  Recent vital signs and recent I/Os reviewed by me.      Wt Readings from Last 3 Encounters:   02/13/23 107 lb (48.5 kg)   02/09/23 111 lb 12.8 oz (50.7 kg)   02/08/23 116 lb (52.6 kg)     BP Readings from Last 3 Encounters:   02/13/23 (!) 133/59   02/09/23 (!) 136/57   02/09/23 (!) 130/56     Patient Vitals for the past 24 hrs:   BP Temp Temp src Pulse Resp SpO2 Weight   02/13/23 0830 (!) 133/59 97.5 °F (36.4 °C) Temporal 78 18 97 % --   02/13/23 0513 -- -- -- -- -- -- 107 lb (48.5 kg)   02/13/23 0400 (!) 144/61 97 °F (36.1 °C) Temporal 74 17 98 % --   02/13/23 0000 (!) 114/47 98.7 °F (37.1 °C) Temporal 84 22 98 % --   02/12/23 2000 (!) 114/50 97.6 °F (36.4 °C) Temporal 73 22 98 % --   02/12/23 1439 (!) 129/53 98.6 °F (37 °C) Temporal 78 26 98 % --       Intake/Output Summary (Last 24 hours) at 2/13/2023 1243  Last data filed at 2/13/2023 0851  Gross per 24 hour   Intake 310 ml   Output 450 ml   Net -140 ml       Physical exam:  General: Awake, Alert,  cachectic  HENT: Atraumatic, normocephalic   Eyes: Normal conjunctiva, Non-incteral sclera. Neck: Supple, JVD not visible. CVS:  Heart sounds are normal. No loud murmur. RS: Normal respiratory effort, Breat sound: diminished at bases. Abd: Soft , bowel sounds are normal, no distension and no tenderness . Skin: No rash , some bruises,   CNS: Awake Oriented , No focal.   Extremities/MSK: trace Edema, no cyanosis.         =======================================================================================     DATA:  Diagnostic tests reviewed by me for today's visit:   (AS NEEDED FOR MY EVALUATION AND MANAGEMENT).        Recent Labs     02/10/23  1523 02/11/23  0457 02/12/23  0421 02/13/23  0444   WBC 6.9 5.9 7.1 7.2   HCT 48.0 40.7 40.3 38.8    246 270 236     Iron Saturation:  No components found for: PERCENTFE  FERRITIN:  No results found for: FERRITIN  IRON:    Lab Results   Component Value Date/Time    IRON 54 11/09/2020 11:05 AM     TIBC:    Lab Results   Component Value Date/Time    TIBC 216 11/09/2020 11:05 AM       Recent Labs     02/10/23  1630 02/11/23  0457 02/12/23  0421 02/13/23  0444    144 145 142   K 3.5 3.6 3.7 3.2*    108 109 110   CO2 26 24 25 22   BUN 48* 45* 56* 60*   CREATININE 1.7* 1.6* 1.8* 1.7*     Recent Labs 02/10/23  1630 02/11/23  0457 02/12/23  0421 02/13/23  0444   CALCIUM 9.3 8.6 8.5 8.2*     No results for input(s): PH, PCO2, PO2 in the last 72 hours.     Invalid input(s): [place]    ABG:  No results found for: PH, PCO2, PO2, HCO3, BE, THGB, TCO2, O2SAT  VBG:    Lab Results   Component Value Date/Time    PHVEN 7.303 09/15/2022 11:04 AM    YAT9UDE 38.8 09/15/2022 11:04 AM    BEVEN -6.7 09/15/2022 11:04 AM    C9XZIBIZ 97 09/15/2022 11:04 AM       LDH:  No results found for: LDH  Uric Acid:    Lab Results   Component Value Date/Time    LABURIC 4.8 05/28/2019 12:31 PM       PT/INR:    Lab Results   Component Value Date/Time    PROTIME 15.9 11/12/2022 02:35 AM    INR 1.27 11/12/2022 02:35 AM     Warfarin PT/INR:  No components found for: PTPATWAR, PTINRWAR  PTT:    Lab Results   Component Value Date/Time    APTT 42.5 01/22/2022 02:16 PM   [APTT}  Last 3 Troponin:    Lab Results   Component Value Date/Time    TROPONINI 0.07 02/11/2023 04:57 AM    TROPONINI 0.07 02/10/2023 04:30 PM    TROPONINI 0.04 02/08/2023 10:37 PM       U/A:    Lab Results   Component Value Date/Time    COLORU Yellow 02/10/2023 09:07 PM    PROTEINU Negative 02/10/2023 09:07 PM    PHUR 5.0 02/10/2023 09:07 PM    WBCUA 1 02/10/2023 09:07 PM    RBCUA 1 02/10/2023 09:07 PM    YEAST neg 03/09/2021 02:00 PM    BACTERIA None Seen 02/10/2023 09:07 PM    CLARITYU CLOUDY 02/10/2023 09:07 PM    SPECGRAV 1.010 02/10/2023 09:07 PM    LEUKOCYTESUR Negative 02/10/2023 09:07 PM    UROBILINOGEN 0.2 02/10/2023 09:07 PM    BILIRUBINUR Negative 02/10/2023 09:07 PM    BILIRUBINUR NEGATIVE 12/04/2011 06:40 PM    BLOODU Negative 02/10/2023 09:07 PM    GLUCOSEU Negative 02/10/2023 09:07 PM    GLUCOSEU NEGATIVE 12/04/2011 06:40 PM     Microalbumen/Creatinine ratio:  No components found for: RUCREAT  24 Hour Urine for Protein:  No components found for: RAWUPRO, UHRS3, NWRY86NY, UTV3  24 Hour Urine for Creatinine Clearance:  No components found for: CREAT4, Inge Hammans  Urine Toxicology:  No components found for: Veronica Jodee, IBENZO, ICOCAINE, IMARTHC, IOPIATES, IPHENCYC    HgBA1c:    Lab Results   Component Value Date/Time    LABA1C 5.5 01/23/2022 04:22 AM     RPR:  No results found for: RPR  HIV:  No results found for: HIV  REMY:  No results found for: ANATITER, REMY  RF:  No results found for: RF  DSDNA:  No components found for: DNA  AMYLASE:  No results found for: AMYLASE  LIPASE:    Lab Results   Component Value Date/Time    LIPASE 21.0 12/10/2022 01:01 PM     Fibrinogen Level:  No components found for: FIB       BELOW MENTIONED RADIOLOGY STUDY RESULTS BY ME (AS NEEDED FOR MY EVALUATION AND MANAGEMENT). CT CHEST WO CONTRAST    Result Date: 2/11/2023  EXAMINATION: CT OF THE CHEST WITHOUT CONTRAST 2/11/2023 9:45 am TECHNIQUE: CT of the chest was performed without the administration of intravenous contrast. Multiplanar reformatted images are provided for review. Automated exposure control, iterative reconstruction, and/or weight based adjustment of the mA/kV was utilized to reduce the radiation dose to as low as reasonably achievable. COMPARISON: Chest x-ray dated 02/10/2023. CT chest dated 11/11/2022. HISTORY: ORDERING SYSTEM PROVIDED HISTORY: persistent cough, SOB with RUL infiltrate r/o PNA TECHNOLOGIST PROVIDED HISTORY: Reason for exam:->persistent cough, SOB with RUL infiltrate r/o PNA Reason for Exam: persistent cough, SOB with RUL infiltrate r/o PNA FINDINGS: Mediastinum: Heart is mildly enlarged. Pacemaker device is noted. Occlusion device noted in the left atrial appendage. There are a few scattered mildly prominent but nonenlarged mediastinal lymph nodes. Calcified lymph nodes noted in the mediastinum and right hilum. Lungs/pleura: There is a tiny right pleural effusion and trace left pleural fluid. There is calcified pleural plaque in the medial left base.   There are mild-to-moderate patchy ground-glass and ill-defined nodular opacities in bilateral lungs, worse on the right, mildly increased when compared to 11/11/2022.  No dense focal airspace consolidation.  No pneumothorax. Upper Abdomen: No acute findings in the visualized upper abdomen. Soft Tissues/Bones: There is no acute or suspicious osseous abnormality. Visualized superficial soft tissues are within normal limits.     1.  Mild-to-moderate patchy ground-glass and ill-defined nodular opacities in bilateral lungs, worse on the right, mildly increased when compared to 11/11/2022.  This appearance is nonspecific and could be related to multifocal pneumonia, atypical/viral pneumonia, or pulmonary edema. 2.  Tiny right pleural effusion and trace left pleural fluid.     XR CHEST PORTABLE    Result Date: 2/10/2023  EXAMINATION: ONE XRAY VIEW OF THE CHEST 2/10/2023 3:44 pm COMPARISON: 02/08/2023 HISTORY: ORDERING SYSTEM PROVIDED HISTORY: sob TECHNOLOGIST PROVIDED HISTORY: Reason for exam:->sob Reason for Exam: sob FINDINGS: Heart size is enlarged without change aorta is normal.  Lungs are normally expanded.  Patchy opacities developing in the right upper lobe.  Prominence of the perihilar regions but this is unchanged..  No pleural effusions. Mild spondylosis     Opacities developing in the right upper lobe        Imaging: [unfilled]         ======================================================================  Please note that this chart entry has been generated using using voice recognition software, mainly.  So please excuse brevity and/or typos.  While every effort and attempts have been made to ensure the accuracy of this automated transcription, some errors may have occurred; and certain words and phrases in transcription may not be entered as intended.  However, inadvertent computerized transcription errors may be present.  So please contact us if any clarification needed.

## 2023-02-13 NOTE — PROGRESS NOTES
Hospitalist Progress Note      PCP: Ivelisse Dumont MD    Date of Admission: 2/10/2023    LOS: 3    Chief Complaint:   Chief Complaint   Patient presents with    Congestive Heart Failure     CHF, congestion, weakness, fatigue, daughter states she needs to be admitted for CHF       Case Summary:   72-year-old lady with history of atrial fibrillation, CAD, hypertension, upper lipidemia, hypothyroidism, CHF, status post Watchman procedure, history of pulm pacemaker who presented with generalized malaise and fatigue with shortness of breath, chest congestion. Patient was seen in the cardiology clinic a day prior to presentation and noted to have included overload at which time she received 120 mg of Lasix with good diuresis. At that time BNP was at 28,010 had come down to 13,000 on admission today. Chest x-ray was noted for right upper lobe infiltrate. She had continued same dose shortness of breath. She reports sick contacts at home. Admitted as inpatient for pneumonia. Started on IV Rocephin and Zithromax. Renal consulted for CKD. Seen by PT/OT, recommend SNF. Discharged to SNF on 2/13/23. CT Chest:  1. Mild-to-moderate patchy ground-glass and ill-defined nodular opacities in   bilateral lungs, worse on the right, mildly increased when compared to   11/11/2022. This appearance is nonspecific and could be related to   multifocal pneumonia, atypical/viral pneumonia, or pulmonary edema. 2.  Tiny right pleural effusion and trace left pleural fluid. Resumed on Torsemide. Discharge delayed due to insurance issues.       Active Hospital Problems    Diagnosis Date Noted    Pneumonia involving right lung [J18.9] 02/11/2023     Priority: Medium    Dyspnea [R06.00] 02/10/2023     Priority: Medium    Coronary artery disease involving native heart without angina pectoris, unspecified vessel or lesion type [I25.10] 10/21/2022     Priority: Medium    Chronic renal failure, stage 3b ([facility] [N18.32] 06/28/2022     Priority: Medium    PAF (paroxysmal atrial fibrillation) ([facility]) [I48.0]     HTN (hypertension), benign [I10]     COPD (chronic obstructive pulmonary disease) ([facility] [J44.9] 02/14/2013    Acquired hypothyroidism [E03.9]     Mixed hyperlipidemia [E78.2] 05/16/2011    Essential hypertension, benign [I10] 05/16/2011         Principal Problem:    Pneumonia involving right lung: Reports associated cough with scanty production. No fevers or chills. CT chest noted for right upper lobe infiltrate suggestive of pneumonia. Elevated CRP. Procalcitonin 0.15. Secondary to RUL PNA  - Continue IV Rocephin and Zithromax  - Added Mucinex  - Continue Prednisone  - O2 supplementation and wean off as tolerated with target sats greater than 90%      Dyspnea: Recently seen in cardiology clinic for similar presentation and received diuresis with good effect. However remaining symptomatic despite Diuresis including further evaluation. Chest x-ray with right upper lobe infiltrate concerning for pneumonia. Reports symptom improvement today. Noted creatinine bumped today to 1.7.   - Resume torsemide today  - Monitor renal function      COPD (chronic obstructive pulmonary disease) ([facility]): Noted wheezing on right upper chest.  Mild exacerbation. Continue steroids and antibiotics. Continue breathing therapy. Active Problems:    Chronic renal failure, stage 3b [facility]): Renal requested to see their patient today. Continue torsemide    Coronary artery disease involving native heart without angina pectoris, unspecified vessel or lesion type: No chest pain at this time. Continue Coreg, clopidogrel, statin    Essential hypertension, benign: Stable on Coreg    Mixed hyperlipidemia: on statin    Acquired hypothyroidism: On thyroid replacement therapy    PAF (paroxysmal atrial fibrillation) ([facility]):  On Coreg      Medications:  Reviewed  Infusion Medications    sodium chloride 5 mL/hr at 02/11/23 2469     Scheduled Medications potassium chloride  40 mEq Oral Once    cefTRIAXone (ROCEPHIN) IV  1,000 mg IntraVENous Q24H    azithromycin  500 mg IntraVENous Q24H    predniSONE  30 mg Oral Daily    carvedilol  25 mg Oral BID WC    clopidogrel  75 mg Oral Daily    famotidine  40 mg Oral BID    levothyroxine  125 mcg Oral Daily    rosuvastatin  5 mg Oral Nightly    topiramate  100 mg Oral BID    [Held by provider] torsemide  10 mg Oral Daily    sodium chloride flush  5-40 mL IntraVENous 2 times per day    heparin (porcine)  5,000 Units SubCUTAneous BID     PRN Meds: albuterol sulfate HFA, nitroGLYCERIN, sodium chloride flush, sodium chloride, ondansetron **OR** ondansetron, polyethylene glycol, acetaminophen **OR** acetaminophen      DVT Prophylaxis: Subcut heparin  Diet: ADULT DIET; Easy to Chew; Low Sodium (2 gm)  Code Status: Full Code    Dispo: SNF    Discussed with patient, nursing and CM. Discussed with Dr Renee Funez (Renal). Resume Torsemide. Discussed with daughter at bedside. Medically stable for discharge to SNF today. Discharge delayed due to insurance issues. ____________________________________________________________________________    Subjective:   Patient requesting to be discharged. Coughing, less SOB. No CP, HA or abdominal pain. No dysphagia, melena or hematochezia. Physical Exam:  BP (!) 133/59   Pulse 70   Temp 97.5 °F (36.4 °C) (Temporal)   Resp 22   Ht 5' 3\" (1.6 m)   Wt 107 lb (48.5 kg)   SpO2 97%   BMI 18.95 kg/m²   General appearance: No apparent distress, appears stated age and cooperative. HEENT: Normocephalic, atraumatic, MMM, No sclera icterus/conjuctival palor  Neck: Supple, no thyromegally. No jugular venous distention. Respiratory: BL Rhonchi. No wheezing or rales. Cardiovascular: S1/S2 without murmurs, rubs or gallops. RRR  Abdomen: Soft, non-tender, non-distended, bowel sounds present. Musculoskeletal: No clubbing, cyanosis or edema bilaterally.     Skin: Skin color, texture, turgor normal.  No rashes or lesions. Neurologic:  Cranial nerves: II-XII intact, AMAURY, No focal sensory/motor deficits        Intake/Output Summary (Last 24 hours) at 2/13/2023 1446  Last data filed at 2/13/2023 1104  Gross per 24 hour   Intake 550 ml   Output 600 ml   Net -50 ml         Labs:   Recent Labs     02/11/23  0457 02/12/23  0421 02/13/23  0444   WBC 5.9 7.1 7.2   HGB 12.3 12.4 11.7*   HCT 40.7 40.3 38.8    270 236        Recent Labs     02/11/23  0457 02/12/23  0421 02/13/23  0444    145 142   K 3.6 3.7 3.2*    109 110   CO2 24 25 22   BUN 45* 56* 60*   CREATININE 1.6* 1.8* 1.7*   CALCIUM 8.6 8.5 8.2*       Recent Labs     02/10/23  1630 02/11/23 0457   TROPONINI 0.07* 0.07*         Urinalysis:    Lab Results   Component Value Date/Time    NITRU Negative 02/10/2023 09:07 PM    WBCUA 1 02/10/2023 09:07 PM    BACTERIA None Seen 02/10/2023 09:07 PM    RBCUA 1 02/10/2023 09:07 PM    BLOODU Negative 02/10/2023 09:07 PM    SPECGRAV 1.010 02/10/2023 09:07 PM    GLUCOSEU Negative 02/10/2023 09:07 PM    GLUCOSEU NEGATIVE 12/04/2011 06:40 PM       Radiology:  CT CHEST WO CONTRAST   Final Result   1. Mild-to-moderate patchy ground-glass and ill-defined nodular opacities in   bilateral lungs, worse on the right, mildly increased when compared to   11/11/2022. This appearance is nonspecific and could be related to   multifocal pneumonia, atypical/viral pneumonia, or pulmonary edema. 2.  Tiny right pleural effusion and trace left pleural fluid.          XR CHEST PORTABLE   Final Result      Opacities developing in the right upper lobe                 Sammy Casey MD

## 2023-02-13 NOTE — PLAN OF CARE
Problem: Discharge Planning  Goal: Discharge to home or other facility with appropriate resources  2/12/2023 2200 by Radha Tripathi RN  Outcome: Progressing  2/12/2023 0826 by TIFFANIE Marquez CNP  Outcome: Progressing     Problem: Safety - Adult  Goal: Free from fall injury  2/12/2023 2200 by Radha Tripathi RN  Outcome: Progressing  2/12/2023 0826 by TIFFANIE Marquez CNP  Outcome: Progressing     Problem: ABCDS Injury Assessment  Goal: Absence of physical injury  2/12/2023 2200 by Radha Tripathi RN  Outcome: Progressing  2/12/2023 0826 by TIFFANIE Marquez CNP  Outcome: Progressing     Problem: Chronic Conditions and Co-morbidities  Goal: Patient's chronic conditions and co-morbidity symptoms are monitored and maintained or improved  2/12/2023 2200 by Radha Tripathi RN  Outcome: Progressing  2/12/2023 0826 by TIFFANIE Marquez CNP  Outcome: Progressing

## 2023-02-13 NOTE — PROGRESS NOTES
[facility] Department   Phone: [phone]    Physical Therapy    [x] Initial Evaluation            [] Daily Treatment Note         [] Discharge Summary      Patient: Amaya Shaffer   : 1940   MRN: [id number]   Date of Service:  2023  Admitting Diagnosis: Pneumonia involving right lung  Current Admission Summary: 63-year-old lady with history of atrial fibrillation, CAD, hypertension, upper lipidemia, hypothyroidism, CHF, status post Watchman procedure, history of pulm pacemaker who presented with generalized malaise and fatigue with shortness of breath, chest     Past Medical History:  has a past medical history of Allergic rhinitis, cause unspecified, Arthritis, Atrial fibrillation ([address] Bronchopneumonia, CAD (coronary artery disease), Cerebral artery occlusion with cerebral infarction ([address] Chronic gouty arthropathy, Chronic kidney disease, Congestive heart failure, unspecified, Degeneration of cervical intervertebral disc, Essential and other specified forms of tremor, Gout, HIGH CHOLESTEROL, History of CVA (cerebrovascular accident), Hx of blood clots, Hypertension, Influenza, Leakage of Watchman left atrial appendage closure device, Mitral valve stenosis and aortic valve insufficiency, Movement disorder, Pacemaker, Peptic ulcer, unspecified site, unspecified as acute or chronic, without mention of hemorrhage, perforation, or obstruction, Thyroid disease, Unspecified disorder of kidney and ureter, and Unspecified hypothyroidism. Past Surgical History:  has a past surgical history that includes back surgery; Cholecystectomy; Cardiac surgery; Coronary artery bypass graft (); shoulder surgery; Cardiac catheterization (2012); hip surgery (Left, 2017); joint replacement; Cardiac catheterization (2018); Percutaneous Transluminal Coronary Angio (2014); pr njx aa&/strd tfrml epi lumbar/sacral 1 level (Right, 12/3/2018);  Femoral-femoral Bypass Graft (N/A, 8/22/2019); femoral bypass (Left, 8/22/2019); and IR KYPHOPLASTY LUMBAR 1 VERTEBRAL BODY (5/14/2021). Discharge Recommendations: Ras Culp scored a 16/24 on the AM-PAC short mobility form. Current research shows that an AM-PAC score of 17 or less is typically not associated with a discharge to the patient's home setting. Based on the patient's AM-PAC score and their current functional mobility deficits, it is recommended that the patient have 3-5 sessions per week of Physical Therapy at d/c to increase the patient's independence. Please see assessment section for further patient specific details. If patient discharges prior to next session this note will serve as a discharge summary. Please see below for the latest assessment towards goals. DME Required For Discharge: DME to be determined at next level of care, DME to be determined pending patient progress  Precautions/Restrictions: high fall risk  Weight Bearing Restrictions: no restrictions  [] Right Upper Extremity  [] Left Upper Extremity [] Right Lower Extremity  [] Left Lower Extremity     Required Braces/Orthotics: no braces required   [] Right  [] Left  Positional Restrictions:no positional restrictions    Pre-Admission Information   Lives With: daughter    Type of Home: house  Home Layout: one level, with basement (pt does not need to go down to basement)  Home Access:  2 step to enter without rails Someone is always with her to assist with the 2 steps. Bathroom Layout: walk in shower  Bathroom Equipment: grab bars in shower, shower chair, hand held shower head  Toilet Height: standard height  Home Equipment: rolling walker, rollator - 4 wheeled walker, single point cane, electric scooter  Transfer Assistance: Independent without use of device  Ambulation Assistance:Independent without use of device Pt reports if she is feeling \"bad\", she will use walker or cane. Scooter in community.   ADL Assistance: independent with all ADL's  IADL Assistance:  Pt does own laundry. Pt can do some cleaning on her own, pt reports she does a lot of her cleaning. Active :        [] Yes  [x] No Daughter drives pt. 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 Pt reports no falls recently. Pt will discharge home with daughter; therefore, daughter's home is described above. Daughter does not work, she is able to remain at home. Pt sleeps in flat bed. Pt questionable historian, daughter present during session and assisted in gathering pre-admission information. Examination   Vision:   Vision Gross Assessment: WFL  Hearing:   hard of hearing  Posture:   Fair  Sensation:   reports numbness and tingling in all extremities; Pt reports mostly numbness in her hands, she does not notice in her feet. ROM:   (B) LE AROM WFL  Strength:   (B) LE strength grossly 4  Therapist Clinical Decision Making (Complexity): medium complexity  Clinical Presentation: evolving      Subjective  General: Pt sitting in recliner upon arrival. Pt agreeable to participating in PT/OT eval. Daughter present during session. Pain: 0/10  Pain Interventions: not applicable       Functional Mobility  Bed Mobility  Bed mobility not completed on this date. Comments:  Transfers  Sit to stand transfer: contact guard assistance  Stand to sit transfer: contact guard assistance  Comments: Pt performed transfers from recliner with CGA. Ambulation  Surface:level surface  Assistive Device: rolling walker  Assistance: contact guard assistance  Distance: 50 ft + 50 ft  Gait Mechanics: shortened bilateral step length and step height; increased medial to lateral sway  Comments:  Pt ambulated without rolling walker for first ~25 ft with CGA.  Pt had very short shuffling steps without walker, so pt prompted to utilize walker. Pt had improved stability with walker. Pt had poor safety awareness and required cues for walker navigation and to properly align walker with her. Stair Mobility  Number of Steps: 2  Step Height: 8 inch  Hand Rails: None  Assistance: Min x 2 when ascending stairs; min + mod A when descending stairs  Comments: Pt negotiated 2 stairs with non reciprocal pattern. Pt had the same strength in both LEs, so pt cued to utilize non-reciprocal stair pattern. Pt held one therapists hands and held onto other therapists elbow for increased stability. Pt required min A x 2 when ascending stairs and required min + mod A when descending stairs. Wheelchair Mobility:  No w/c mobility completed on this date. Comments:  Balance  Static Sitting Balance: fair (+): maintains balance at SBA/supervision without use of UE support  Dynamic Sitting Balance: fair (+): maintains balance at SBA/supervision without use of UE support  Static Standing Balance: fair: maintains balance at CGA without use of UE support  Dynamic Standing Balance: fair (-): maintains balance at CGA with use of UE support  Comments: Pt sat in recliner ~5 minutes performing ADLs. Other Therapeutic Interventions  Pt education about recommendation of SNF prior to return to home. Increased education about use of 2WW. Pt resistant to utilize 2WW.   Functional Outcomes  -PAC Inpatient Mobility Raw Score : 16              Cognition  Overall Cognitive Status: Impaired  Following Commands: follows one step commands consistently  Memory: decreased short term memory  Safety Judgement: decreased awareness of need for assistance, decreased awareness of need for safety  Problem Solving: assistance required to generate solutions, assistance required to implement solutions, decreased awareness of errors, assistance required to identify errors made, assistance required to correct errors made  Insights: not aware of deficits  Initiation: requires cues for some  Sequencing: requires cues for some  Orientation:    oriented to person, oriented to place, and oriented to situation; (disoriented to month, oriented to year)  Command Following:   [facility]    Education  Barriers To Learning: cognition  Patient Education: patient educated on goals, PT role and benefits, plan of care, general safety, functional mobility training, proper use of assistive device/equipment, energy conservation, family education, transfer training, discharge recommendations  Learning Assessment:  patient verbalizes understanding, would benefit from continued reinforcement    Assessment  Activity Tolerance: Limited by poor endurance and muscle weakness. Impairments Requiring Therapeutic Intervention: decreased functional mobility, decreased strength, decreased safety awareness, decreased endurance, decreased balance  Prognosis: good  Clinical Assessment: Pt presents with decreased endurance, decreased strength, and impaired balance which impacts pt's ability to perform functional mobility independently. Pt requires cues for safety and is unsafe for ambulation without use of 2WW. Pt has 2 stairs to enter both her daughter's home and her home, and requires significant assistance to perform the stairs. Pt is normally independent with transfers and ambulation within her home, and requires skilled therapy to allow pt to safely return to [facility]. Following discharge from the hospital, pt is not safe to discharge home, and will require additional therapy to promote safety and progress independence with functional mobility.   Safety Interventions: patient left in chair, chair alarm in place, call light within reach, gait belt, patient at risk for falls, nurse notified, and family/caregiver present    Plan  Frequency: 3-5 x/per week  Current Treatment Recommendations: strengthening, balance training, functional mobility training, transfer training, gait training, stair training, endurance training, neuromuscular re-education, patient/caregiver education, home exercise program, and safety education    Goals  Patient Goals: not stated   Short Term Goals:  Time Frame: upon d/c  Patient will complete bed mobility at modified independent   Patient will complete transfers at supervision   Patient will ambulate 150 ft with use of rolling walker at stand by assistance  Patient will ascend/descend 2 stairs without use of HR at contact guard assistance    Therapy Session Time      Individual Group Co-treatment   Time In     1305   Time Out     1406   Minutes     61     Timed Code Treatment Minutes:   46  Total Treatment Minutes:  187 JAIME Najera  Electronically Signed By: PT observed and directed the above evaluation/treatment.   [address], DPT [id number]

## 2023-02-13 NOTE — DISCHARGE INSTR - COC
Continuity of Care Form  CHF Pathway  _x_ Cardiovascular Assessment.  Titrate O2 to keep SaO2 greater than 90%    _x_ Daily Weights- Baseline Wt:107 lb   Call MD if:   3 pound weight gain or loss in one day OR 5 pound weight gain in one week       _x_No added salt diet (3-4 G sodium) and 64 oz fluid restriction      _x_ Labs:   BMP,BNP    Please start on    Frequency:  Weekly x 4              Fax results to: [facility]: [phone]      _x_ Med List attached:   Hold Coreg/Metoprolol if HR less than 45 or patient symptomatic*   Hold ACE/ARB if SBP less than 85 or patient symptomatic*   Do not hold Spironolactone (aldactone) for hypotension/bradycardia   Call MD for questions: [facility]F Clinic: [phone]    _x_Follow up appointment with cardiology: date/time:  with [facility] NOTIFY CHF TEAM- CALL Mary Lou Martínez0 A MESSAGE      Patient Name: Tati Hylton   :  1940  MRN:  [id number]    Admit date:  2/10/2023  Discharge date:  2023    Code Status Order: Full Code   Advance Directives:     Admitting Physician:  Sharlene Barry MD  PCP: Ricarda Kern MD    Discharging Nurse: [facility] Unit/Room#: U7F-3695/1905-56  Discharging Unit Phone Number: [phone]    Emergency Contact:   Extended Emergency Contact Information  Primary Emergency Contact: [facility]  Address: [address] Phone: [phone]  Mobile Phone: [phone]  Relation: Child  Secondary Emergency Contact: CYNDI Plasencia [address] Phone: [phone]  Relation: Child    Past Surgical History:  Past Surgical History:   Procedure Laterality Date    BACK SURGERY      CARDIAC CATHETERIZATION  2012    CARDIAC CATHETERIZATION  2018    Unsuccesful  of RCA    CARDIAC SURGERY      CABG & Cardiac ablation    CHOLECYSTECTOMY      CORONARY ARTERY BYPASS GRAFT  1987    LIMA- Diag/LAD, SVG- RCA FEMORAL BYPASS Left 8/22/2019    LEFT FEMORAL TO POPLITEAL BYPASS GRAFT performed by Dez Beaulieu MD at [facility] GRAFT N/A 8/22/2019    FEMORAL TO FEMORAL BYPASS performed by Dez Beaulieu MD at [address] Left 03/16/2017    Left hip pinning    IR KYPHOPLASTY LUMBAR FIRST LEVEL  5/14/2021    IR KYPHOPLASTY LUMBAR FIRST LEVEL 5/14/2021 [facility] SPECIAL PROCEDURES    JOINT REPLACEMENT      AR NJX AA&/STRD TFRML EPI LUMBAR/SACRAL 1 LEVEL Right 12/3/2018    RIGHT L3 AND L4 LUMBAR TRANSFORAMINAL EPIRUAL STEROID INJECTION WITH FLUOROSCOPY performed by Matthew Zavala MD at [address] [facility]    PTCA  11/04/2014    MARLENY - 3.0 x 28 to the Ist Diag    SHOULDER SURGERY      left       Immunization History:   Immunization History   Administered Date(s) Administered    COVID-19, MODERNA BLUE border, Primary or Immunocompromised, (age 12y+), IM, 100 mcg/0.5mL 01/28/2021, 02/25/2021, 11/16/2021    Influenza 09/19/2012, 10/02/2013    Influenza A (G1N3-70) Vaccine IM 12/21/2009    Influenza Virus Vaccine 10/03/2005, 10/12/2007, 09/28/2012, 10/04/2013, 10/20/2014, 10/05/2015    Influenza Whole 09/14/2011    Influenza, FLUAD, (age 72 y+), Adjuvanted, 0.5mL 11/02/2021, 10/28/2022    Influenza, FLUBLOK, (age 25 y+), PF, 0.5mL 10/12/2020    Influenza, High Dose (Fluzone 65 yrs and older) 10/10/2014, 09/30/2015, 11/02/2016, 10/24/2017, 09/17/2018    Influenza, Triv, inactivated, subunit, adjuvanted, IM (Fluad 65 yrs and older) 09/30/2019    Pneumococcal Conjugate 13-valent (Pehkrnc23) 12/04/2015    Pneumococcal Conjugate 7-valent (Prevnar7) 12/03/2009    Pneumococcal Polysaccharide (Kfrkfvngz19) 09/28/2000, 01/28/2008    Tdap (Boostrix, Adacel) 08/17/2011, 05/12/2020, 12/08/2021       Active Problems:  Patient Active Problem List   Diagnosis Code    Essential hypertension, benign I10    Mixed hyperlipidemia E78.2    Chronic gouty arthropathy M1A. 00X0    Acquired hypothyroidism E03.9    Non-rheumatic mitral regurgitation I34.0    COPD (chronic obstructive pulmonary disease) ([facility]) J44.9    Restrictive lung disease J98.4    Sick sinus syndrome ([facility]) I49.5    Allergic rhinitis J30.9    Fibrocystic breast N60.19    Cerebrovascular accident (CVA) ([facility] I63.9    HTN (hypertension), benign I10    Pacemaker Z95.0    PAT (paroxysmal atrial tachycardia) ([facility]) I47.1    Primary osteoarthritis involving multiple joints M15.9    Vitamin D deficiency E55.9    Tremor R25.1    Chronic total occlusion of native coronary artery I25.10, I25.82    Age related osteoporosis M81.0    Chronic combined systolic (congestive) and diastolic (congestive) heart failure ([facility]) I50.42    Stage 3 chronic kidney disease ([facility]) N18.30    PAD (peripheral artery disease) ([facility]) I73.9    Atherosclerosis of native arteries of extremities with intermittent claudication, bilateral legs ([facility]) I70.213    Idiopathic peripheral neuropathy G60.9    Ischemic cardiomyopathy I25.5    Peripheral vertigo, bilateral H81.393    PAF (paroxysmal atrial fibrillation) ([facility]) I48.0    Dyslipidemia E78.5    Iron deficiency anemia D50.9    Bilateral sensorineural hearing loss H90.3    Memory loss due to medical condition R41.3    Symptomatic bradycardia R00.1    Pacemaker lead failure T82.110A    Presence of Watchman left atrial appendage closure device Z95.818    Chronic right sacroiliac pain M53.3, G89.29    Postcholecystectomy diarrhea K91.89, R19.7    Chronic renal failure, stage 3b ([facility]) N18.32    Coronary artery disease involving native heart without angina pectoris, unspecified vessel or lesion type I25.10    Hoarseness, persistent R49.0    Protein calorie malnutrition E46    Dyspnea R06.00    Pneumonia involving right lung J18.9       Isolation/Infection:   Isolation            No Isolation          Patient Infection Status       Infection Onset Added Last Indicated Last Indicated By Review Planned Expiration Resolved Resolved By    None active    Resolved    COVID-19 (Rule Out) 23 COVID-19, Rapid (Ordered)   23 Rule-Out Test Resulted    C-diff Rule Out 12/10/22 12/10/22 12/10/22 Clostridium Difficile Toxin/Antigen (Ordered)   22 Rule-Out Test Canceled    C-diff (Clostridium difficile) 22 C. difficile toxin Molecular   22     C-diff Rule Out 22 C. difficile toxin Molecular (Ordered)   22 Rule-Out Test Resulted    C-diff Rule Out 22 GI Bacterial Pathogens By PCR (Ordered)   22 Rule-Out Test Resulted    COVID-19 (Rule Out) 22 Respiratory Panel, Molecular, with COVID-19 (Restricted: peds pts or suitable admitted adults) (Ordered)   22 Rule-Out Test Resulted    C-diff Rule Out 09/10/22 09/10/22 09/10/22 Clostridium difficile toxin/antigen (Ordered)   22 Stanton Higuera RN    Order     COVID-19 (Rule Out) 20 COVID-19 (Ordered)   20 Rule-Out Test Resulted            Nurse Assessment:  Last Vital Signs: BP (!) 133/59   Pulse 70   Temp 97.5 °F (36.4 °C) (Temporal)   Resp 22   Ht 5' 3\" (1.6 m)   Wt 107 lb (48.5 kg)   SpO2 97%   BMI 18.95 kg/m²     Last documented pain score (0-10 scale): Pain Level: 0  Last Weight:   Wt Readings from Last 1 Encounters:   23 107 lb (48.5 kg)     Mental Status:  oriented and alert    IV Access:  - None    Nursing Mobility/ADLs:  Walking   Assisted  Transfer  Assisted  Bathing  Assisted  Dressing  Assisted  Toileting  Assisted  Feeding  Independent  Med Admin  Assisted  Med Delivery   whole    Wound Care Documentation and Therapy:  Puncture 21 Anterior;Proximal;Right (Active)   Number of days: 636        Elimination:  Continence:    Bowel: No  Bladder: No  Urinary Catheter: None   Colostomy/Ileostomy/Ileal Conduit: No       Date of Last BM: 23    Intake/Output Summary (Last 24 hours) at 2023 1304  Last data filed at 2023 1104  Gross per 24 hour   Intake 550 ml   Output 600 ml   Net -50 ml     I/O last 3 completed shifts: In: 840 [P.O.:840]  Out: 550 [Urine:550]    Safety Concerns:     None and At Risk for Falls    Impairments/Disabilities:      None    Nutrition Therapy:  Current Nutrition Therapy:   - Oral Diet:  General    Routes of Feeding: Oral  Liquids: Thin Liquids  Daily Fluid Restriction: no  Last Modified Barium Swallow with Video (Video Swallowing Test): not done    Treatments at the Time of Hospital Discharge:   Respiratory Treatments: None  Oxygen Therapy:  is not on home oxygen therapy. Ventilator:    - No ventilator support    Rehab Therapies: Physical Therapy and Occupational Therapy  Weight Bearing Status/Restrictions: No weight bearing restrictions  Other Medical Equipment (for information only, NOT a DME order):  walker  Other Treatments: None    Patient's personal belongings (please select all that are sent with patient):  None    RN SIGNATURE:  Electronically signed by Hiram Blanco RN on 2/14/23 at 11:36 AM EST    CASE MANAGEMENT/SOCIAL WORK SECTION    Inpatient Status Date: 02/10/2023    Readmission Risk Assessment Score: 21%  Readmission Risk              Risk of Unplanned Readmission:  35           Discharging to Facility/ [address] [facility] F[facility]ad Gautam   [place] Aw, [address]  Phone: [phone]  Fax: [phone]        / signature: Electronically signed by BRIANNA Dunne on 2/15/2023 at 2:16 PM        PHYSICIAN SECTION    Prognosis: Fair    Condition at Discharge: Stable    Rehab Potential (if transferring to Rehab): Fair    Recommended Labs or Other Treatments After Discharge:     Physician Certification: I certify the above information and transfer of Rich Pickens  is necessary for the continuing treatment of the diagnosis listed and that she requires home care for less 30 days.      Update Admission H&P: No change in H&P    PHYSICIAN SIGNATURE: Electronically signed by Ferdinand Babcock MD on 2/13/23 at 1:04 PM EST

## 2023-02-13 NOTE — PROGRESS NOTES
Assessment complete. VSS. Afebrile. Denies pain. SpO2 @ 98% via room air. Patient A&Ox4, but does have intermittent confusion at times. Safety precautions in place. Call light within reach.

## 2023-02-14 ENCOUNTER — APPOINTMENT (OUTPATIENT)
Dept: GENERAL RADIOLOGY | Age: 83
DRG: 194 | End: 2023-02-14
Payer: MEDICARE

## 2023-02-14 LAB
ANION GAP SERPL CALCULATED.3IONS-SCNC: 10 MMOL/L (ref 3–16)
BASOPHILS ABSOLUTE: 0.1 K/UL (ref 0–0.2)
BASOPHILS RELATIVE PERCENT: 0.9 %
BUN BLDV-MCNC: 46 MG/DL (ref 7–20)
CALCIUM SERPL-MCNC: 8.4 MG/DL (ref 8.3–10.6)
CHLORIDE BLD-SCNC: 112 MMOL/L (ref 99–110)
CO2: 22 MMOL/L (ref 21–32)
CREAT SERPL-MCNC: 1.5 MG/DL (ref 0.6–1.2)
EOSINOPHILS ABSOLUTE: 0.1 K/UL (ref 0–0.6)
EOSINOPHILS RELATIVE PERCENT: 1.3 %
FERRITIN: 92.7 NG/ML (ref 15–150)
FOLATE: 10.28 NG/ML (ref 4.78–24.2)
GFR SERPL CREATININE-BSD FRML MDRD: 35 ML/MIN/{1.73_M2}
GLUCOSE BLD-MCNC: 83 MG/DL (ref 70–99)
HCT VFR BLD CALC: 38.5 % (ref 36–48)
HCT VFR BLD CALC: 39.2 % (ref 36–48)
HEMOGLOBIN: 11.6 G/DL (ref 12–16)
IMMATURE RETIC FRACT: 0.36 (ref 0.21–0.37)
IRON SATURATION: 21 % (ref 15–50)
IRON: 53 UG/DL (ref 37–145)
LYMPHOCYTES ABSOLUTE: 0.8 K/UL (ref 1–5.1)
LYMPHOCYTES RELATIVE PERCENT: 11.3 %
MCH RBC QN AUTO: 22.9 PG (ref 26–34)
MCHC RBC AUTO-ENTMCNC: 30.2 G/DL (ref 31–36)
MCV RBC AUTO: 75.8 FL (ref 80–100)
MONOCYTES ABSOLUTE: 0.3 K/UL (ref 0–1.3)
MONOCYTES RELATIVE PERCENT: 4.5 %
NEUTROPHILS ABSOLUTE: 5.6 K/UL (ref 1.7–7.7)
NEUTROPHILS RELATIVE PERCENT: 82 %
PDW BLD-RTO: 19.5 % (ref 12.4–15.4)
PLATELET # BLD: 253 K/UL (ref 135–450)
PMV BLD AUTO: 10.2 FL (ref 5–10.5)
POTASSIUM SERPL-SCNC: 3.7 MMOL/L (ref 3.5–5.1)
RBC # BLD: 5.08 M/UL (ref 4–5.2)
RETICULOCYTE ABSOLUTE COUNT: 0.06 M/UL (ref 0.02–0.1)
RETICULOCYTE COUNT PCT: 1.24 % (ref 0.5–2.18)
SODIUM BLD-SCNC: 144 MMOL/L (ref 136–145)
TOTAL IRON BINDING CAPACITY: 251 UG/DL (ref 260–445)
VITAMIN B-12: 775 PG/ML (ref 211–911)
WBC # BLD: 6.8 K/UL (ref 4–11)

## 2023-02-14 PROCEDURE — 92526 ORAL FUNCTION THERAPY: CPT

## 2023-02-14 PROCEDURE — 2580000003 HC RX 258: Performed by: INTERNAL MEDICINE

## 2023-02-14 PROCEDURE — 82728 ASSAY OF FERRITIN: CPT

## 2023-02-14 PROCEDURE — 97530 THERAPEUTIC ACTIVITIES: CPT

## 2023-02-14 PROCEDURE — 85045 AUTOMATED RETICULOCYTE COUNT: CPT

## 2023-02-14 PROCEDURE — 97110 THERAPEUTIC EXERCISES: CPT

## 2023-02-14 PROCEDURE — 1200000000 HC SEMI PRIVATE

## 2023-02-14 PROCEDURE — 36415 COLL VENOUS BLD VENIPUNCTURE: CPT

## 2023-02-14 PROCEDURE — 6370000000 HC RX 637 (ALT 250 FOR IP): Performed by: FAMILY MEDICINE

## 2023-02-14 PROCEDURE — 97116 GAIT TRAINING THERAPY: CPT

## 2023-02-14 PROCEDURE — 74018 RADEX ABDOMEN 1 VIEW: CPT

## 2023-02-14 PROCEDURE — 82607 VITAMIN B-12: CPT

## 2023-02-14 PROCEDURE — 6370000000 HC RX 637 (ALT 250 FOR IP): Performed by: INTERNAL MEDICINE

## 2023-02-14 PROCEDURE — 6360000002 HC RX W HCPCS: Performed by: INTERNAL MEDICINE

## 2023-02-14 PROCEDURE — 80048 BASIC METABOLIC PNL TOTAL CA: CPT

## 2023-02-14 PROCEDURE — 2580000003 HC RX 258: Performed by: FAMILY MEDICINE

## 2023-02-14 PROCEDURE — 83540 ASSAY OF IRON: CPT

## 2023-02-14 PROCEDURE — 82746 ASSAY OF FOLIC ACID SERUM: CPT

## 2023-02-14 PROCEDURE — 83550 IRON BINDING TEST: CPT

## 2023-02-14 PROCEDURE — 6360000002 HC RX W HCPCS: Performed by: FAMILY MEDICINE

## 2023-02-14 PROCEDURE — 85025 COMPLETE CBC W/AUTO DIFF WBC: CPT

## 2023-02-14 RX ADMIN — HEPARIN SODIUM 5000 UNITS: 5000 INJECTION INTRAVENOUS; SUBCUTANEOUS at 22:24

## 2023-02-14 RX ADMIN — PREDNISONE 30 MG: 20 TABLET ORAL at 09:30

## 2023-02-14 RX ADMIN — ROSUVASTATIN 5 MG: 10 TABLET, FILM COATED ORAL at 22:24

## 2023-02-14 RX ADMIN — CLOPIDOGREL BISULFATE 75 MG: 75 TABLET ORAL at 09:30

## 2023-02-14 RX ADMIN — FAMOTIDINE 40 MG: 20 TABLET, FILM COATED ORAL at 09:30

## 2023-02-14 RX ADMIN — FAMOTIDINE 40 MG: 20 TABLET, FILM COATED ORAL at 22:24

## 2023-02-14 RX ADMIN — AZITHROMYCIN MONOHYDRATE 500 MG: 500 INJECTION, POWDER, LYOPHILIZED, FOR SOLUTION INTRAVENOUS at 10:53

## 2023-02-14 RX ADMIN — CARVEDILOL 25 MG: 25 TABLET, FILM COATED ORAL at 09:33

## 2023-02-14 RX ADMIN — HEPARIN SODIUM 5000 UNITS: 5000 INJECTION INTRAVENOUS; SUBCUTANEOUS at 09:30

## 2023-02-14 RX ADMIN — CARVEDILOL 25 MG: 25 TABLET, FILM COATED ORAL at 18:25

## 2023-02-14 RX ADMIN — LEVOTHYROXINE SODIUM 125 MCG: 0.03 TABLET ORAL at 09:33

## 2023-02-14 RX ADMIN — TOPIRAMATE 100 MG: 100 TABLET, FILM COATED ORAL at 09:30

## 2023-02-14 RX ADMIN — TORSEMIDE 10 MG: 20 TABLET ORAL at 09:30

## 2023-02-14 RX ADMIN — SODIUM CHLORIDE, PRESERVATIVE FREE 10 ML: 5 INJECTION INTRAVENOUS at 09:39

## 2023-02-14 RX ADMIN — SODIUM CHLORIDE, PRESERVATIVE FREE 10 ML: 5 INJECTION INTRAVENOUS at 22:25

## 2023-02-14 RX ADMIN — CEFTRIAXONE SODIUM 1000 MG: 1 INJECTION, POWDER, FOR SOLUTION INTRAMUSCULAR; INTRAVENOUS at 09:36

## 2023-02-14 RX ADMIN — TOPIRAMATE 100 MG: 100 TABLET, FILM COATED ORAL at 22:24

## 2023-02-14 NOTE — DISCHARGE INSTR - DIET
For nutrition questions after discharge please call the Registered Dietitian at [phone]. Heart Failure Nutrition Therapy  This diet will help you feel better and support your heart by reducing symptoms of fluid retention, shortness of breath and swelling. You should focus on:  Limiting sodium in your diet by reading labels and limiting foods high in sodium. Limit your daily sodium intake to 2,000 mg per day. Select foods with 140 mg of sodium or less per serving. Foods with more than 300 mg of sodium per serving may not fit into a reduced-sodium meal plan. Check serving sizes. If you eat more than 1 serving, you will get more sodium than the amount listed. Limiting fluid in your diet. Ask your doctor how much fluid you can have per day  Remember foods that are liquid at room temperature such as popsicles, soup, ice cream and Jell-O are fluids. Checking your weight to make sure you're not retaining too much fluid. Weigh yourself every morning. If you gain 3 or more pounds in one day or 5 pounds within 1 week, call your doctor. Foods to choose and avoid:  Avoid processed foods. Eat more fresh foods. Fresh and frozen fruits and vegetables are good choices. Choose fresh meats. Avoid processed meats such as mitchell, sausage and hot dogs. Do not add salt to your food while cooking or at the table. Try dry or fresh herbs, pepper, lemon juice, or a sodium-free seasoning blend such as Mrs. Dash to add flavor to food. Do not use a salt substitute. Use caution at [employer] foods are high in sodium. Ask for your food to be cooked without salt and request sauces and dressing to come on the side. 

## 2023-02-14 NOTE — PROGRESS NOTES
Initial shift assessment completed, see complex assessment in doc flowsheet. Vss, afebrile, denies any pain at this time. Daughter at the bedside she is asking the doctor to call her she will be leaving soon. Daughter does not want her to be discharged until she is not having diarrhea. Call light within reach, encouraged to call for nurse as needed throughout the shift.

## 2023-02-14 NOTE — CARE COORDINATION
Discharge Planning:     (CM) called Kayla with [facility] to notify her that the Patient would not be coming to the facility today as Patient refused to leave due to incontinence of bowel and bladder. GI to see the Patient and then discuss D/C after that.        Electronically signed by BRIANNA Chaudhari on 2/14/2023 at 2:38 PM

## 2023-02-14 NOTE — PROGRESS NOTES
[facility] [facility]   Phone: [phone]    Physical Therapy    [x] Initial Evaluation            [] Daily Treatment Note         [] Discharge Summary      Patient: Jeff Arroyo   : 1940   MRN: [id number]   Date of Service:  2023  Admitting Diagnosis: Pneumonia involving right lung  Current Admission Summary: 80-year-old lady with history of atrial fibrillation, CAD, hypertension, upper lipidemia, hypothyroidism, CHF, status post Watchman procedure, history of pulm pacemaker who presented with generalized malaise and fatigue with shortness of breath, chest     Past Medical History:  has a past medical history of Allergic rhinitis, cause unspecified, Arthritis, Atrial fibrillation ([address] Bronchopneumonia, CAD (coronary artery disease), Cerebral artery occlusion with cerebral infarction ([address] Chronic gouty arthropathy, Chronic kidney disease, Congestive heart failure, unspecified, Degeneration of cervical intervertebral disc, Essential and other specified forms of tremor, Gout, HIGH CHOLESTEROL, History of CVA (cerebrovascular accident), Hx of blood clots, Hypertension, Influenza, Leakage of Watchman left atrial appendage closure device, Mitral valve stenosis and aortic valve insufficiency, Movement disorder, Pacemaker, Peptic ulcer, unspecified site, unspecified as acute or chronic, without mention of hemorrhage, perforation, or obstruction, Thyroid disease, Unspecified disorder of kidney and ureter, and Unspecified hypothyroidism. Past Surgical History:  has a past surgical history that includes back surgery; Cholecystectomy; Cardiac surgery; Coronary artery bypass graft (); shoulder surgery; Cardiac catheterization (2012); hip surgery (Left, 2017); joint replacement; Cardiac catheterization (2018); Percutaneous Transluminal Coronary Angio (2014); pr njx aa&/strd tfrml epi lumbar/sacral 1 level (Right, 12/3/2018);  Femoral-femoral Bypass Graft (N/A, 8/22/2019); femoral bypass (Left, 8/22/2019); and IR KYPHOPLASTY LUMBAR 1 VERTEBRAL BODY (5/14/2021). Discharge Recommendations: Moisés Luo scored a 17/24 on the AM-PAC short mobility form. Current research shows that an AM-PAC score of 17 or less is typically not associated with a discharge to the patient's home setting. Based on the patient's AM-PAC score and their current functional mobility deficits, it is recommended that the patient have 3-5 sessions per week of Physical Therapy at d/c to increase the patient's independence. Please see assessment section for further patient specific details. If patient discharges prior to next session this note will serve as a discharge summary. Please see below for the latest assessment towards goals. DME Required For Discharge: patient has all required DME for discharge  Precautions/Restrictions: high fall risk  Weight Bearing Restrictions: no restrictions  [] Right Upper Extremity  [] Left Upper Extremity [] Right Lower Extremity  [] Left Lower Extremity     Required Braces/Orthotics: no braces required   [] Right  [] Left  Positional Restrictions:no positional restrictions    Pre-Admission Information   Lives With: daughter    Type of Home: house  Home Layout: one level, with basement (pt does not need to go down to basement)  Home Access:  2 step to enter without rails Someone is always with her to assist with the 2 steps. Bathroom Layout: walk in shower  Bathroom Equipment: grab bars in shower, shower chair, hand held shower head  Toilet Height: standard height  Home Equipment: rolling walker, rollator - 4 wheeled walker, single point cane, electric scooter  Transfer Assistance: Independent without use of device  Ambulation Assistance:Independent without use of device Pt reports if she is feeling \"bad\", she will use walker or cane. Scooter in community. ADL Assistance: independent with all ADL's  IADL Assistance:  Pt does own laundry. Pt can do some cleaning on her own, pt reports she does a lot of her cleaning. Active :        [] Yes  [x] No Daughter drives pt. 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 Pt reports no falls recently. Pt will discharge home with daughter; therefore, daughter's home is described above. Daughter does not work, she is able to remain at home. Pt sleeps in flat bed. Pt questionable historian, daughter present during session and assisted in gathering pre-admission information. Examination   Vision:   Vision Gross Assessment: WFL  Hearing:   hard of hearing  Posture:   Fair  Sensation:   reports numbness and tingling in all extremities; Pt reports mostly numbness in her hands, she does not notice in her feet. ROM:   (B) LE AROM WFL  Strength:   (B) LE strength grossly 4  Therapist Clinical Decision Making (Complexity): medium complexity  Clinical Presentation: evolving      Subjective  General: Pt supine in bed upon arrival. Pt with soiled brief but unaware. Pt denies pain at rest, frustrated by her cough. Pain: 0/10  Pain Interventions: repositioned        Functional Mobility  Bed Mobility  Supine to Sit: stand by assistance  Scooting: stand by assistance  Comments: Pt sat EOB with good balance. Transfers  Sit to stand transfer: contact guard assistance  Stand to sit transfer: contact guard assistance  Comments: Pt performed transfers from EOB, toilet 2 times, and recliner 2 times with CGA and VCs needed for hand placement.    Ambulation  Surface:level surface  Assistive Device: rolling walker  Assistance: contact guard assistance  Distance: 20 ft x 2, then 60 ft  Gait Mechanics: shortened bilateral step length and step height; increased medial to lateral sway, decreased safety awareness and taking hands off RW during amb.   Comments:  Pt ambulated to bathroom and stood at sink for hygiene. Pt then sat in recliner to rest. Pt amb in hallway and back to chair. Pt reporting fatigue and weakness during amb, SOB noted. Pt always 99% or better with on RA with O2 sats but wet cough all during activity. Pt had poor safety awareness and required cues for walker navigation and to properly align walker with her. Stair Mobility  Stair mobility not completed on this date. Comments:   Wheelchair Mobility:  No w/c mobility completed on this date. Comments:  Balance  Static Sitting Balance: fair (+): maintains balance at SBA/supervision without use of UE support  Dynamic Sitting Balance: fair (+): maintains balance at SBA/supervision without use of UE support  Static Standing Balance: fair: maintains balance at CGA without use of UE support  Dynamic Standing Balance: fair (-): maintains balance at CGA with use of UE support  Comments: Pt with RW in standing and walking    Other Therapeutic Interventions  Pt urinated and had loose BM. Pt needing assist with feliciano hygiene, barrier cream applied due to soreness, donning new brief and pants. Pt stood at sink 4 min with SBA balance for hand hygiene. Pt needing seated rest break in chair. At end of ambulation, performed seated there ex including marching, LAQs, HR/TR x 10 bilat. Pt distracted and occasional VCs to keep on task.    Functional Outcomes  AM-PAC Inpatient Mobility Raw Score : 17              Cognition  Overall Cognitive Status: Impaired  Following Commands: follows one step commands consistently  Memory: decreased short term memory  Safety Judgement: decreased awareness of need for assistance, decreased awareness of need for safety  Problem Solving: assistance required to generate solutions, assistance required to implement solutions, decreased awareness of errors, assistance required to identify errors made, assistance required to correct errors made  Insights: not aware of deficits  Initiation: requires cues for some  Sequencing: requires cues for some  Orientation:    oriented to person, oriented to place, and disoriented to situation; (disoriented to month, oriented to year)  Command Following:   WFL -needing 1 step instructions.    Education  Barriers To Learning: cognition  Patient Education: patient educated on goals, PT role and benefits, plan of care, general safety, functional mobility training, proper use of assistive device/equipment, energy conservation, family education, transfer training, discharge recommendations  Learning Assessment:  patient verbalizes understanding, would benefit from continued reinforcement    Assessment  Activity Tolerance: Limited by poor endurance and muscle weakness.  Impairments Requiring Therapeutic Intervention: decreased functional mobility, decreased strength, decreased safety awareness, decreased endurance, decreased balance  Prognosis: good  Clinical Assessment: CGA transfers and gait with RW. Pt presents with decreased endurance, decreased strength, and impaired balance which impacts pt's ability to perform functional mobility independently. Pt has 2 stairs to enter both her daughter's home and her home, and requires significant assistance to perform the stairs. Pt is normally independent with transfers and ambulation within her home, and requires skilled therapy to allow pt to safely return to [facility]. Following discharge from the hospital, pt is not safe to discharge home, and will require additional therapy to promote safety and progress independence with functional mobility.  Safety Interventions: patient left in chair, chair alarm in place, call light within reach, gait belt, patient at risk for falls, and nurse notified    Plan  Frequency: 3-5 x/per week  Current Treatment Recommendations: strengthening, balance training, functional mobility training, transfer training, gait training, stair training, endurance training, neuromuscular  re-education, patient/caregiver education, home exercise program, and safety education    Goals  Patient Goals: not stated   Short Term Goals:  Time Frame: upon d/c  Patient will complete bed mobility at modified independent   Patient will complete transfers at supervision   Patient will ambulate 150 ft with use of rolling walker at stand by assistance  Patient will ascend/descend 2 stairs without use of HR at contact guard assistance  **No goals met in full this date, 2/14.     Therapy Session Time      Individual Group Co-treatment   Time In 0940       Time Out 1026       Minutes 46         Timed Code Treatment Minutes:  55 Minutes    Electronically Signed By:Dulce Maria [id number] KATJA Wheeler [id number]

## 2023-02-14 NOTE — PROGRESS NOTES
Ambulated twice to the bathroom independently with standby only. She is stating she is ready to go home she feels she can be ok at home. No distress noted not incontinent this trip to the bathroom.  She is aware she needs a stool sample

## 2023-02-14 NOTE — PROGRESS NOTES
Facility/Department: [address]  Speech Language Pathology   Dysphagia Treatment Note    Patient: Staci Menezes   : 1940   MRN: [id number]      Evaluation Date: 2023      Admitting Dx: Acute pulmonary edema (HCC) [J81.0]  Dyspnea [R06.00]  Elevated serum creatinine [R79.89]  Elevated troponin [R77.8]  Pneumonia due to infectious organism, unspecified laterality, unspecified part of lung [J18.9]  Treatment Diagnosis: Oropharyngeal Dysphagia   Pain: Did not state                                              Diet and Treatment Recommendations 2023:  Diet Solids Recommendation:  Easy to chew  Liquid Consistency Recommendation:  Thin liquids  Recommended form of Meds: Meds whole with water or Meds in puree         Compensatory strategies:   Upright as possible with all PO intake , Small bites/sips , Eat/feed slowly, Remain upright 30-45 min     Assessment of Texture Tolerance:  Diet level prior to treatment: Easy to chew , Thin liquids   Tolerance of Current Diet Level:Per chart, no noted difficulty with current diet level  RN reported pt appears to be tolerating current diet level     Impressions: Pt was positioned Upright in chair, awake and alert. Currently on room air. Limited trials of soft solids were provided to assess swallow function. The pt declined to trial a regular texture or thin liquids today despite encouragement. The pt was able to feed herself the soft solid trial and demonstrates mastication grossly within normal limits with full oral clearance achieved. The pt demonstrates intermittent congested coughing prior to and during trials. Unable to discern baseline cough vs s/s of aspiration therefore recommend use of compensatory strategies to maximize safety. Overall, pt demonstrates increased risk for aspiration due to cognitive state , respiratory status , and weak cough . Based on today's assessment recommend Easy to chew  with Thin liquids , Meds whole with water or Meds in puree .  If the pt continues to demonstrate overt signs/symptoms of penetration/aspiration with oral intake, she may benefit from completing an instrumental swallow assessment (either MBSS or FEES). Dysphagia Goals:   Pt will functionally tolerate recommended diet with no overt clinical s/s of aspiration (Ongoing 02/14/23)  Pt will demonstrate understanding of aspiration risk and precautions via education/demonstration with occasional prompting (Ongoing 02/14/23)  If clinical s/s of aspiration/penetration continue to be noted, Pt will participate in Modified Barium Swallow Study (Ongoing 02/14/23)    Plan:   3-5 times per week during acute care stay. Patient/Family Education:  Provided education regarding role of SLP, recommendations and general speech pathology plan of care. [x] Pt verbalized understanding and agreement   [] Pt requires ongoing learning   [] No evidence of comprehension     Discharge Recommendations:    Discharge recommendations to be determined pending ongoing follow-up during acute care stay    Treatment time:  Timed Code Treatment Minutes: 0  Total Treatment time: 10 min    If patient discharges prior to next session this note will serve as a discharge summary. Signature:   CHRISTIAN RivasS. [id number] [facility] #SP. [id number] [facility]

## 2023-02-14 NOTE — CARE COORDINATION
Discharge Planning:     (PERICO) Mack Mueller with [facility] advised they can ACCEPT this Patient. PERICO called Gail with [facility] who advised they received the referral and are still reviewing and that she or Aletha Renteria would call CM back shortly. Electronically signed by BRIANNA Fang on 2/14/2023 at 9:01 AM    UPDATE :    [facility] can ACCEPT this Patient. Electronically signed by BRIANNA Fang on 2/14/2023 at 9:10 AM      UPDATE :    PERICO called and spoke with the Patient's daughter- Gisela Lau [phone] and advised her of the two accepting facilities. She chose [facility]. She asked for CM to schedule transport. Daughter advised she would be in to see her mom in about an hour. She advised her mother has a CT scan for this Friday at [facility] and wanted to see if that could be done today so that she wouldn't have to come back.  PERICO notified the charge nurse, Merced Hua of this and he was going to speak with Dr. Fabian Renee.     CM to follow up with Patient's daughter when she gets to the hospital.       Electronically signed by BRIANNA Fang on 2/14/2023 at 9:24 AM

## 2023-02-14 NOTE — CARE COORDINATION
Discharge note:      CM/SW has been notified of discharge. Patient noted to have the following needs at discharge. CM/SW has coordinated the following services: [facility] [place] [id number]  Report: [phone]  Fax: [phone]    Discharge Time: 2:00PM  AVS faxed and agency notified: Yes. The following prescriptions sent with patient: None. Nurse to call report to facility [phone]  Family advised of discharge and in agreement. HENS completed. Discharge Destination: [facility]. Transportation: [facility].        Comment: IMM completed. All CM/SW needs met, will sign off.     Electronically signed by BRIANNA Bolaños on 2/14/2023 at 11:51 AM

## 2023-02-14 NOTE — CONSULTS
Gastroenterology Consult Note        Patient: Kevin Vincent  : 1940  Acct#:      Date:  2023    Subjective:       History of Present Illness  Patient is a 80 y.o.  female admitted with Acute pulmonary edema ([facility] [address] [J81.0]  Dyspnea [R06.00]  Elevated serum creatinine [R79.89]  Elevated troponin [R77.8]  Pneumonia due to infectious organism, unspecified laterality, unspecified part of lung [J18.9] who is seen in consult for fecal incontinence. H/o CHF, hypothyroidism. CVA, COPD, CAD, HTN, CABG. Patient admitted with pneumonia and fluid overload. She is a poor historian. Her daughter helps give some history and reports some confusion over the past few days here. .  They report that patient had diarrhea and fecal incontinence and was diagnosed with C. difficile in . Sounds like she was treated with vancomycin. States she had recurrent diarrhea and was treated for C. difficile several times by her PCP with what sounds like vancomycin. I only see one positive C. difficile in 2022. Sounds like at some point she did start having solid bowel movements. At some point here she began having diarrhea and fecal incontinence. Denies abdominal pain, nausea, vomiting, melena, hematochezia.     Past Medical History:   Diagnosis Date    Allergic rhinitis, cause unspecified     Arthritis     Atrial fibrillation (HCC)     Bronchopneumonia     CAD (coronary artery disease)     stent:  post cataract surgery (CABG)    Cerebral artery occlusion with cerebral infarction [facility])     TIA    Chronic gouty arthropathy     Chronic kidney disease     Congestive heart failure, unspecified     Degeneration of cervical intervertebral disc     Essential and other specified forms of tremor     Gout     HIGH CHOLESTEROL     History of CVA (cerebrovascular accident)     Hx of blood clots     Hypertension     Influenza 2017    Leakage of Watchman left atrial appendage closure device Mitral valve stenosis and aortic valve insufficiency     Movement disorder     back problems    Pacemaker     Peptic ulcer, unspecified site, unspecified as acute or chronic, without mention of hemorrhage, perforation, or obstruction     Thyroid disease     Unspecified disorder of kidney and ureter     Unspecified hypothyroidism       Past Surgical History:   Procedure Laterality Date    BACK SURGERY      CARDIAC CATHETERIZATION  7/2012    CARDIAC CATHETERIZATION  08/07/2018    Unsuccesful  of RCA    CARDIAC SURGERY      CABG & Cardiac ablation    CHOLECYSTECTOMY      CORONARY ARTERY BYPASS GRAFT  1987    LIMA- Diag/LAD, SVG- RCA    FEMORAL BYPASS Left 8/22/2019    LEFT FEMORAL TO POPLITEAL BYPASS GRAFT performed by Shadia Bobby MD at [facility] GRAFT N/A 8/22/2019    FEMORAL TO FEMORAL BYPASS performed by Shadia Bobby MD at [address] Left 03/16/2017    Left hip pinning    IR KYPHOPLASTY LUMBAR FIRST LEVEL  5/14/2021    IR KYPHOPLASTY LUMBAR FIRST LEVEL 5/14/2021 [facility] SPECIAL PROCEDURES    JOINT REPLACEMENT      MN NJX AA&/STRD TFRML EPI LUMBAR/SACRAL 1 LEVEL Right 12/3/2018    RIGHT L3 AND L4 LUMBAR TRANSFORAMINAL EPIRUAL STEROID INJECTION WITH FLUOROSCOPY performed by Bria Jenkins MD at [address]    PTCA  11/04/2014    MARLENY - 3.0 x 28 to the Ist Diag    SHOULDER SURGERY      left      Past Endoscopic History  Colonoscopy 2015 with Dr Sis Quan for h/o polyps and diarrhea: normal TI, 3 polyps, diverticulosis. F/u colon in 3 years. Admission Meds  No current facility-administered medications on file prior to encounter.      Current Outpatient Medications on File Prior to Encounter   Medication Sig Dispense Refill    albuterol sulfate HFA (PROVENTIL HFA) 108 (90 Base) MCG/ACT inhaler Inhale 2 puffs into the lungs every 4 hours as needed for Wheezing or Shortness of Breath (Space out to every 6 hours as symptoms improve) Space out to every 6 hours as symptoms improve.  18 g 0    famotidine (PEPCID) 40 MG tablet Take 1 tablet by mouth 2 times daily (Patient not taking: No sig reported) 60 tablet 0    predniSONE (DELTASONE) 10 MG tablet 1 TID for 3 day then 1 BID (Patient not taking: Reported on 2/10/2023) 15 tablet 0    nitroGLYCERIN (NITROLINGUAL) 0.4 MG/SPRAY 0.4 mg spray Place 1 spray under the tongue every 5 minutes as needed for Chest pain 4.9 g 1    torsemide (DEMADEX) 20 MG tablet 10 mg every day except Sunday and as directed for weight gain 60 tablet 1    clopidogrel (PLAVIX) 75 MG tablet Take 1 tablet by mouth daily 90 tablet 2    carvedilol (COREG) 25 MG tablet Take 1 tablet by mouth 2 times daily (with meals) (Patient taking differently: Take 25 mg by mouth 2 times daily) 180 tablet 3    rosuvastatin (CRESTOR) 5 MG tablet Take 1 tablet by mouth nightly 90 tablet 1    levothyroxine (SYNTHROID) 125 MCG tablet Take 1 tablet by mouth Daily 90 tablet 0    aspirin EC 81 MG EC tablet Take 1 tablet by mouth daily 90 tablet 1    topiramate (TOPAMAX) 50 MG tablet TAKE 2 TABLETS TWICE DAILY 360 tablet 0    vitamin D (ERGOCALCIFEROL) 1.25 MG (81577 UT) CAPS capsule TAKE 1 CAPSULE ONCE A WEEK 12 capsule 1    allopurinol (ZYLOPRIM) 100 MG tablet TAKE 1 TABLET EVERY DAY 90 tablet 1    fluticasone (FLONASE) 50 MCG/ACT nasal spray 2 sprays by Each Nostril route daily 16 g 0        Allergies  Allergies   Allergen Reactions    Aspirin Nausea Only    Diltiazem Anaphylaxis    Diltiazem Hcl Anaphylaxis    Lorazepam Other (See Comments)     hallucinations  hallucinations  hallucinations    Sulfa Antibiotics Rash and Hives    Atorvastatin Other (See Comments)     Muscle pains  Muscle pains  Muscle pains    Dabigatran Nausea Only     And indigestion  And indigestion    Aka Pradaxa  And indigestion  And indigestion  And indigestion    Aka Pradaxa  And indigestion  And indigestion  And indigestion    Aka Pradaxa    Mysoline [Primidone]     Nsaids      Other reaction(s): Unknown    Other Other (See Comments)     Nitroglycerin patches causes severe headaches    Primidone      Other reaction(s): Unknown      Social   Social History     Tobacco Use    Smoking status: Former     Packs/day: 1.00     Years: 28.00     Pack years: 28.00     Types: Cigarettes     Quit date: 1987     Years since quittin.1    Smokeless tobacco: Never    Tobacco comments:     H.O.smoking at age 15 / smoked up to 1 p.p.d / quit    Substance Use Topics    Alcohol use: Yes     Comment: social        Family History   Problem Relation Age of Onset    Cancer Sister     Heart Disease Sister     Diabetes Brother     Hypertension Brother     Heart Disease Brother     Stroke Maternal Aunt     Heart Disease Maternal Aunt     Diabetes Maternal Uncle     Hypertension Paternal Aunt     Cancer Maternal Grandmother     Cancer Paternal Grandmother     Rheum Arthritis Neg Hx     Lupus Neg Hx              Physical Exam  Blood pressure (!) 157/63, pulse 75, temperature 97.2 °F (36.2 °C), temperature source Temporal, resp. rate 23, height 5' 3\" (1.6 m), weight 107 lb (48.5 kg), SpO2 98 %, not currently breastfeeding. General appearance: alert, cooperative, no distress, appears stated age  Eyes: Anicteric  Head: Normocephalic, without obvious abnormality  Lungs: clear to auscultation bilaterally, Normal Effort  Heart: regular rate and rhythm, normal S1 and S2, no murmurs or rubs  Abdomen: soft, non-tender. Bowel sounds normal. No masses,  no organomegaly.    Extremities: atraumatic, no cyanosis or edema  Skin: warm and dry, no jaundice  Neuro: Grossly intact, A&OX3  Musculoskeletal: 5/5  strength BUE      Data Review:    Recent Labs     234 230   WBC 7.1 7.2 6.8   HGB 12.4 11.7* 11.6*   HCT 40.3 38.8 38.5   MCV 75.0* 75.9* 75.8*    236 253     Recent Labs     234 23  0450    142 144   K 3.7 3.2* 3.7    110 112*   CO2 25 22 22   BUN 56* 60* 46* CREATININE 1.8* 1.7* 1.5*     No results for input(s): AST, ALT, ALB, BILIDIR, BILITOT, ALKPHOS in the last 72 hours. No results for input(s): LIPASE, AMYLASE in the last 72 hours. No results for input(s): PROTIME, INR in the last 72 hours. No results for input(s): PTT in the last 72 hours. No results for input(s): OCCULTBLD in the last 72 hours. Imaging Studies:                               Assessment/Plan:     Diarrhea -  patient with history of C. Difficile. Check stool for C.diff, GI bacterial pathogens PCR, and EIA for parasites   Check AXR to r/o constipation with overflow diarrhea  Microcytic anemia - no gross GI bleeding. Last colonoscopy was in . Check iron studies      Discussed with Dr. Oralia Garza, MAY  [facility]    I have personally performed a face to face diagnostic evaluation on this patient. I have spent more than 50% of the total clinical encounter in interviewing/examining the patient, reviewing patient chart (including but not limited to notes, labs, imaging and other testing), documentation of findings and subsequent follow up of ordered medication and testing, placing referrals and communication with patient care providers, coordinating future care, as well as documentation in the EHR. I agree with the findings and recommended plan of care, as documented by the physician assistant/nurse practitioner. In summary, my findings and plan are the followin-year-old  female with CHF, CVA, COPD, coronary artery disease status post CABG, who was admitted for pneumonia and fluid overload. 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 The past month, patient complaining of intermittent soft stools with fecal incontinence. She is worried that she has another C. difficile infection. Abdominal exam is benign  Impression and plan: Intermittent diarrhea with fecal incontinence.   Differential diagnosis recurrent C. difficile infection, postinfectious IBS, paradoxical diarrhea from chronic constipation. Check stool infectious panel. Abdominal x-ray to check for stool load.       London Ocampo MD, MSc  [facility]  02/14/23

## 2023-02-14 NOTE — PROGRESS NOTES
Hospitalist Progress Note      PCP: Elana Slater MD    Date of Admission: 2/10/2023    Chief Complaint: Fatigue, dyspnea      Subjective:   Dyspnea is improving. Still with intermittent cough   Weakness slowly getting better    Notified in pm per nursing that pt with diarrhea and stool incontinence. Medications:  Reviewed    Infusion Medications    sodium chloride 5 mL/hr at 02/11/23 1759     Scheduled Medications    cefTRIAXone (ROCEPHIN) IV  1,000 mg IntraVENous Q24H    predniSONE  30 mg Oral Daily    carvedilol  25 mg Oral BID WC    clopidogrel  75 mg Oral Daily    famotidine  40 mg Oral BID    levothyroxine  125 mcg Oral Daily    rosuvastatin  5 mg Oral Nightly    topiramate  100 mg Oral BID    torsemide  10 mg Oral Daily    sodium chloride flush  5-40 mL IntraVENous 2 times per day    heparin (porcine)  5,000 Units SubCUTAneous BID     PRN Meds: melatonin, HYDROcodone 5 mg - acetaminophen, albuterol sulfate HFA, nitroGLYCERIN, sodium chloride flush, sodium chloride, ondansetron **OR** ondansetron, acetaminophen **OR** acetaminophen      Intake/Output Summary (Last 24 hours) at 2/14/2023 1718  Last data filed at 2/14/2023 1339  Gross per 24 hour   Intake 480 ml   Output 400 ml   Net 80 ml       Exam:    /66   Pulse 70   Temp 98.8 °F (37.1 °C) (Temporal)   Resp 16   Ht 5' 3\" (1.6 m)   Wt 107 lb (48.5 kg)   SpO2 100%   BMI 18.95 kg/m²     Gen/overall appearance: Not in acute distress. Alert. Head: Normocephalic, atraumatic  Eyes: EOMI, no scleral icterus  CVS: regular rate and rhythm, Normal S1S2  Pulm: Clear to auscultation bilaterally. No crackles/wheezes  Extremities: No edema.  No erythema or warmth  Neuro: No gross focal deficits noted  Skin: Warm, dry    Labs:   Recent Labs     02/12/23  0421 02/13/23  0444 02/14/23  0450   WBC 7.1 7.2 6.8   HGB 12.4 11.7* 11.6*   HCT 40.3 38.8 39.2  38.5    236 253     Recent Labs     02/12/23  0421 02/13/23  0444 02/14/23  0450   NA 145 142 144   K 3.7 3.2* 3.7    110 112*   CO2 25 22 22   BUN 56* 60* 46*   CREATININE 1.8* 1.7* 1.5*   CALCIUM 8.5 8.2* 8.4     No results for input(s): AST, ALT, BILIDIR, BILITOT, ALKPHOS in the last 72 hours. No results for input(s): INR in the last 72 hours. No results for input(s): Valerie Blanco in the last 72 hours. Assessment/Plan:    Active Hospital Problems    Diagnosis Date Noted    Pneumonia involving right lung [J18.9] 02/11/2023     Priority: Medium    Dyspnea [R06.00] 02/10/2023     Priority: Medium    Coronary artery disease involving native heart without angina pectoris, unspecified vessel or lesion type [I25.10] 10/21/2022     Priority: Medium    [facility] [N18.32] 06/28/2022     Priority: Medium    PAF (paroxysmal atrial fibrillation) ([facility]) [I48.0]     HTN (hypertension), benign [I10]     COPD (chronic obstructive pulmonary disease) ([facility] [J44.9] 02/14/2013    Acquired hypothyroidism [E03.9]     Mixed hyperlipidemia [E78.2] 05/16/2011    Essential hypertension, benign [I10] 05/16/2011       RUL CAP Pneumonia  - CT chest noted for right upper lobe infiltrate suggestive of pneumonia. Elevated CRP. Procalcitonin 0.15  - s/p IV Rocephin and Zithromax  - Added Mucinex  - Continue Prednisone  - O2 per protocol  -- po azithro on d/c     Mild acute COPD exacerbation. Likely 2/2 above  - on prednisone  - med nebs  - respiratory care  - abx as per above    Diarrhea with questionable stool incontinence. Possibly abx induced? History of C diff  - repeat C.  Diff testing  - stool studies  - trial of imodium if neg  - GI following     PMH of CAD, CKD 3b, htn, hld, hypothyroidism, PAF      DVT Prophylaxis: heparin  Diet: ADULT DIET; Easy to Chew; Low Sodium (2 gm)  Code Status: Full Code    Dispo - SNF    Michael Narayan MD

## 2023-02-14 NOTE — PROGRESS NOTES
Daughter and patient are adamant that patient is needs to stay due to her new incontinence of bowel and bladder. Patient does have a history of c-diff. RN explained that there isn't much we will be able to do about incontinence of bowel as an inpatient. I personally spoke with Georgia Bright from GI who stated they would see this patient today.      Jessica Skinner BSN, RN, CCRN

## 2023-02-14 NOTE — CARE COORDINATION
02/14/23 1112   IMM Letter   IMM Letter given to Patient/Family/Significant other/Guardian/POA/by: IMM Letter provided to Patient's daughter (POA) by GAVIN. Patient's daughter Mike Yu) signed the IMM Letter.   [facility]   IMM Letter date given: 02/14/23   IMM Letter time given: 1109       Electronically signed by BRIANNA Garcia on 2/14/2023 at 11:13 AM

## 2023-02-14 NOTE — PROGRESS NOTES
MD Kell Fernández MD Retta Loots, MD                  Office: [phone]                      Fax: [phone]             8 [facility]                   NEPHROLOGY INPATIENT PROGRESS NOTE:     PATIENT NAME: Kareem Nation  : 1940  MRN: [id number]      RECOMMENDATIONS:   -Resumed torsemide 10 mg a day, and as needed also - on discharge too.    -Low-dose K repletion given - not needed now   -Pneumonia management per primary team    -Most of her respiratory symptoms are likely from her underlying lung disease, she has not seen a lung doctor in the past, so I suggested to follow up w/ pulmonologist here.      -I have placed outpatient referral for pulmonary and updated follow-up provider for d/c.    -stable for discharge - pending social issues,    -Overall prognosis guarded, with cachexia,    Discussed with patient, her daughter, primary team       IMPRESSION:       Admitted on:  2/10/2023  2:59 PM   For:  Acute pulmonary edema ([facility] [address] [facility] [J81.0]  Dyspnea [R06.00]  Elevated serum creatinine [R79.89]  Elevated troponin [R77.8]  Pneumonia due to infectious organism, unspecified laterality, unspecified part of lung [J18.9]          GIANA (on nonproteinuric CKD: stage 3A):   - BL Scr-most recently 1.2-1.4 as of 12--> this admission peaked at 1.8  - Etiology of GIANA -prerenal  - other differentials: Unlikely a DNR GN / TI / TMA process  - UA : results reviewed: :       Associated problems:      Chronic-systolic heart failure-   Weight in past around 116-119 pounds,   Lowest 113 lbs  -now this admission 111,  pounds, likely cachexia,  -BMI 19          S/P [facility] 22 Dr Ramin Devine   IV contrast 61 cc  No LVEDP check          on CT -  A few subcentimeter hyperdense nodules are seen in  the right and left kidney, incompletely characterized on noncontrast study,  most likely hemorrhagic or proteinaceous cyst.  Simple appearing cysts are  seen in the right and left kidney   ->> Urology was consulted in past     Other major problems: Management per primary and other consulting teams. Hospital Problems             Last Modified POA    * (Principal) Pneumonia involving right lung 2/11/2023 Yes    Chronic renal failure, stage 3b ([facility] 2/11/2023 Yes    Coronary artery disease involving native heart without angina pectoris, unspecified vessel or lesion type 2/11/2023 Yes    Dyspnea 2/11/2023 Yes    Essential hypertension, benign 2/11/2023 Yes    Mixed hyperlipidemia (Chronic) 2/11/2023 Yes    Acquired hypothyroidism 2/11/2023 Yes    COPD (chronic obstructive pulmonary disease) ([facility] 2/11/2023 Yes    HTN (hypertension), benign 2/11/2023 Yes    PAF (paroxysmal atrial fibrillation) ([facility] 2/11/2023 Yes         : other supportive care :   - Check daily renal function panel with electrolytes-phosphorus  - Strict monitoring of I/Os, daily weight  - Renal feeds/diet  - Current medications reviewed. - Nephrotoxic medications have been discontinued. - Dose adjusted and appropriate. - Dose meds for eGFR <15 mL/min/1.73m2 during GIANA    - Avoid heavy opioids due to renal failure - may use very low dose dilaudid / fentanyl with close monitoring of CNS and respiratory depression. Please refer to the orders. High Complexity. Multiple complex problems. Discussed with patient and treatment team-    Time spent > 30 (~35) minutes. Thank you for allowing me to participate in this patient's care. Please do not hesitate to contact me anytime. We will follow along with you.        Delilah Montiel MD,  [facility] [id number] [place] [address] [place]: [phone] or [address] [employer]  Fax: [phone]        =======================================================================================   =======================================================================================    Subjective / interval history:   Patient was seen comfortably sitting up  in the bed ,   Reported no  active complaints,   Renal function better  W/o hypoxia on RA. Chart reviewed, patient's pertinent electronic medical records , Medical history and medication reviewed as needed for my evaulation. Past medical, Surgical, Social, Family medical history reviewed by me. MEDICATIONS: reviewed by me. Medications Prior to Admission:  No current facility-administered medications on file prior to encounter. Current Outpatient Medications on File Prior to Encounter   Medication Sig Dispense Refill    albuterol sulfate HFA (PROVENTIL HFA) 108 (90 Base) MCG/ACT inhaler Inhale 2 puffs into the lungs every 4 hours as needed for Wheezing or Shortness of Breath (Space out to every 6 hours as symptoms improve) Space out to every 6 hours as symptoms improve.  18 g 0    famotidine (PEPCID) 40 MG tablet Take 1 tablet by mouth 2 times daily (Patient not taking: No sig reported) 60 tablet 0    predniSONE (DELTASONE) 10 MG tablet 1 TID for 3 day then 1 BID (Patient not taking: Reported on 2/10/2023) 15 tablet 0    nitroGLYCERIN (NITROLINGUAL) 0.4 MG/SPRAY 0.4 mg spray Place 1 spray under the tongue every 5 minutes as needed for Chest pain 4.9 g 1    torsemide (DEMADEX) 20 MG tablet 10 mg every day except Sunday and as directed for weight gain 60 tablet 1    clopidogrel (PLAVIX) 75 MG tablet Take 1 tablet by mouth daily 90 tablet 2    carvedilol (COREG) 25 MG tablet Take 1 tablet by mouth 2 times daily (with meals) (Patient taking differently: Take 25 mg by mouth 2 times daily) 180 tablet 3    rosuvastatin (CRESTOR) 5 MG tablet Take 1 tablet by mouth nightly 90 tablet 1    levothyroxine (SYNTHROID) 125 MCG tablet Take 1 tablet by mouth Daily 90 tablet 0    aspirin EC 81 MG EC tablet Take 1 tablet by mouth daily 90 tablet 1    topiramate (TOPAMAX) 50 MG tablet TAKE 2 TABLETS TWICE DAILY 360 tablet 0    vitamin D (ERGOCALCIFEROL) 1.25 MG (52235 UT) CAPS capsule TAKE 1 CAPSULE ONCE A WEEK 12 capsule 1    allopurinol (ZYLOPRIM) 100 MG tablet TAKE 1 TABLET EVERY DAY 90 tablet 1    fluticasone (FLONASE) 50 MCG/ACT nasal spray 2 sprays by Each Nostril route daily 16 g 0         Current Facility-Administered Medications:     melatonin tablet 3 mg, 3 mg, Oral, Nightly PRN, TIFFANIE Welch CNP    HYDROcodone-acetaminophen (NORCO) 5-325 MG per tablet 1 tablet, 1 tablet, Oral, Q6H PRN, TIFFANIE Welch CNP    cefTRIAXone (ROCEPHIN) 1,000 mg in sodium chloride 0.9 % 50 mL IVPB (mini-bag), 1,000 mg, IntraVENous, Q24H, Ruben Valles MD, Last Rate: 100 mL/hr at 02/14/23 0936, 1,000 mg at 02/14/23 0936    azithromycin (ZITHROMAX) 500 mg in sodium chloride 0.9 % 250 mL (vial-mate) IVPB, 500 mg, IntraVENous, Q24H, Ruben Valles MD, Stopped at 02/13/23 1104    predniSONE (DELTASONE) tablet 30 mg, 30 mg, Oral, Daily, Ruben Valles MD, 30 mg at 02/14/23 0930    albuterol sulfate HFA (PROVENTIL;VENTOLIN;PROAIR) 108 (90 Base) MCG/ACT inhaler 2 puff, 2 puff, Inhalation, Q4H PRN, Smith Fallon MD    carvedilol (COREG) tablet 25 mg, 25 mg, Oral, BID WC, Alycia Johnson MD, 25 mg at 02/14/23 0933    clopidogrel (PLAVIX) tablet 75 mg, 75 mg, Oral, Daily, Alycia Johnson MD, 75 mg at 02/14/23 0930    famotidine (PEPCID) tablet 40 mg, 40 mg, Oral, BID, Alycia Johnson MD, 40 mg at 02/14/23 0930    levothyroxine (SYNTHROID) tablet 125 mcg, 125 mcg, Oral, Daily, Alycia Johnson MD, 125 mcg at 02/14/23 0933    nitroGLYCERIN (NITROLINGUAL) 0.4 mg spray 1 spray, 1 spray, SubLINGual, Q5 Min PRN, Alycia Johnson MD    rosuvastatin (CRESTOR) tablet 5 mg, 5 mg, Oral, Nightly, Alycia Johnson MD, 5 mg at 02/13/23 2046    topiramate (TOPAMAX) tablet 100 mg, 100 mg, Oral, BID, Alycia Johnson MD, 100 mg at 02/14/23 0930    torsemide (DEMADEX) tablet 10 mg, 10 mg, Oral, Daily, Alycia Johnson MD, 10 mg at 02/14/23 0930    sodium chloride flush 0.9 % injection 5-40 mL, 5-40 mL, IntraVENous, 2 times per day, Smith Fallon MD, 10 mL at 02/13/23 2047    sodium chloride flush 0.9 % injection 5-40 mL, 5-40 mL, IntraVENous, PRN, Reinaldo Merlos MD    0.9 % sodium chloride infusion, , IntraVENous, PRN, Reinaldo Merlos MD, Last Rate: 5 mL/hr at 02/11/23 1759, New Bag at 02/11/23 1759    heparin (porcine) injection 5,000 Units, 5,000 Units, SubCUTAneous, BID, Reinaldo Merlos MD, 5,000 Units at 02/14/23 0930    ondansetron (ZOFRAN-ODT) disintegrating tablet 4 mg, 4 mg, Oral, Q8H PRN **OR** ondansetron (ZOFRAN) injection 4 mg, 4 mg, IntraVENous, Q6H PRN, Reinaldo Merlos MD    polyethylene glycol (GLYCOLAX) packet 17 g, 17 g, Oral, Daily PRN, Reinaldo Merlos MD    acetaminophen (TYLENOL) tablet 650 mg, 650 mg, Oral, Q6H PRN **OR** acetaminophen (TYLENOL) suppository 650 mg, 650 mg, Rectal, Q6H PRN, Reinaldo Merlos MD         REVIEW OF SYSTEMS:  As mentioned in chief complaint and HPI , Subjective         =======================================================================================     PHYSICAL EXAM:  Recent vital signs and recent I/Os reviewed by me.      Wt Readings from Last 3 Encounters:   02/13/23 107 lb (48.5 kg)   02/09/23 111 lb 12.8 oz (50.7 kg)   02/08/23 116 lb (52.6 kg)     BP Readings from Last 3 Encounters:   02/14/23 (!) 152/59   02/09/23 (!) 136/57   02/09/23 (!) 130/56     Patient Vitals for the past 24 hrs:   BP Temp Temp src Pulse Resp SpO2   02/14/23 0600 -- -- -- 70 23 --   02/14/23 0500 -- -- -- 70 18 --   02/14/23 0400 (!) 152/59 97.8 °F (36.6 °C) -- 70 18 98 %   02/14/23 0300 -- -- -- 72 17 --   02/14/23 0200 -- -- -- 70 19 --   02/14/23 0100 -- -- -- 70 18 --   02/14/23 0000 (!) 143/52 -- -- 79 18 --   02/13/23 2329 (!) 152/59 97.2 °F (36.2 °C) Temporal 77 23 98 %   02/13/23 2300 -- -- -- 81 19 --   02/13/23 2200 -- -- -- 75 24 --   02/13/23 2100 -- -- -- 77 22 96 %   02/13/23 2000 (!) 116/54 98.1 °F (36.7 °C) Temporal 77 19 96 %   02/13/23 1830 (!) 146/82 98.6 °F (37 °C) -- 82 26 --   02/13/23 1200 -- -- -- 70 -- --   02/13/23 1000 -- -- -- 70 22 -- Intake/Output Summary (Last 24 hours) at 2/14/2023 0564  Last data filed at 2/14/2023 0500  Gross per 24 hour   Intake 240 ml   Output 550 ml   Net -310 ml       Physical exam:  General: Awake, Alert,  cachectic  HENT: Atraumatic, normocephalic   Eyes: Normal conjunctiva, Non-incteral sclera. Neck: Supple, JVD not visible. CVS:  Heart sounds are normal. No loud murmur. RS: Normal respiratory effort, Breat sound: diminished at bases. Abd: Soft , bowel sounds are normal, no distension and no tenderness . Skin: No rash , some bruises,   CNS: Awake Oriented , No focal.   Extremities/MSK: trace Edema, no cyanosis.         =======================================================================================     DATA:  Diagnostic tests reviewed by me for today's visit:   (AS NEEDED FOR MY EVALUATION AND MANAGEMENT). Recent Labs     02/12/23 0421 02/13/23 0444 02/14/23  0450   WBC 7.1 7.2 6.8   HCT 40.3 38.8 38.5    236 253     Iron Saturation:  No components found for: PERCENTFE  FERRITIN:  No results found for: FERRITIN  IRON:    Lab Results   Component Value Date/Time    IRON 54 11/09/2020 11:05 AM     TIBC:    Lab Results   Component Value Date/Time    TIBC 216 11/09/2020 11:05 AM       Recent Labs     02/12/23 0421 02/13/23  0444 02/14/23  0450    142 144   K 3.7 3.2* 3.7    110 112*   CO2 25 22 22   BUN 56* 60* 46*   CREATININE 1.8* 1.7* 1.5*     Recent Labs     02/12/23 0421 02/13/23 0444 02/14/23  0450   CALCIUM 8.5 8.2* 8.4     No results for input(s): PH, PCO2, PO2 in the last 72 hours.     Invalid input(s): [place] Je[employer] witness    ABG:  No results found for: PH, PCO2, PO2, HCO3, BE, THGB, TCO2, O2SAT  VBG:    Lab Results   Component Value Date/Time    PHVEN 7.303 09/15/2022 11:04 AM    PGB1ART 38.8 09/15/2022 11:04 AM    BEVEN -6.7 09/15/2022 11:04 AM    A3GYUXNT 97 09/15/2022 11:04 AM       LDH:  No results found for: LDH  Uric Acid:    Lab Results   Component Value Date/Time    LABURIC 4.8 05/28/2019 12:31 PM       PT/INR:    Lab Results   Component Value Date/Time    PROTIME 15.9 11/12/2022 02:35 AM    INR 1.27 11/12/2022 02:35 AM     Warfarin PT/INR:  No components found for: PTPATWAR, PTINRWAR  PTT:    Lab Results   Component Value Date/Time    APTT 42.5 01/22/2022 02:16 PM   [APTT}  Last 3 Troponin:    Lab Results   Component Value Date/Time    TROPONINI 0.07 02/11/2023 04:57 AM    TROPONINI 0.07 02/10/2023 04:30 PM    TROPONINI 0.04 02/08/2023 10:37 PM       U/A:    Lab Results   Component Value Date/Time    COLORU Yellow 02/10/2023 09:07 PM    PROTEINU Negative 02/10/2023 09:07 PM    PHUR 5.0 02/10/2023 09:07 PM    WBCUA 1 02/10/2023 09:07 PM    RBCUA 1 02/10/2023 09:07 PM    YEAST neg 03/09/2021 02:00 PM    BACTERIA None Seen 02/10/2023 09:07 PM    CLARITYU CLOUDY 02/10/2023 09:07 PM    SPECGRAV 1.010 02/10/2023 09:07 PM    LEUKOCYTESUR Negative 02/10/2023 09:07 PM    UROBILINOGEN 0.2 02/10/2023 09:07 PM    BILIRUBINUR Negative 02/10/2023 09:07 PM    BILIRUBINUR NEGATIVE 12/04/2011 06:40 PM    BLOODU Negative 02/10/2023 09:07 PM    GLUCOSEU Negative 02/10/2023 09:07 PM    GLUCOSEU NEGATIVE 12/04/2011 06:40 PM     Microalbumen/Creatinine ratio:  No components found for: RUCREAT  24 Hour Urine for Protein:  No components found for: RAWUPRO, UHRS3, EYMK85II, UTV3  24 Hour Urine for Creatinine Clearance:  No components found for: CREAT4, UHRS10, UTV10  Urine Toxicology:  No components found for: IAMMENTA, IBARBIT, IBENZO, ICOCAINE, IMARTHC, IOPIATES, IPHENCYC    HgBA1c:    Lab Results   Component Value Date/Time    LABA1C 5.5 01/23/2022 04:22 AM     RPR:  No results found for: RPR  HIV:  No results found for: HIV  REMY:  No results found for: ANATITER, REMY  RF:  No results found for: RF  DSDNA:  No components found for: DNA  AMYLASE:  No results found for: AMYLASE  LIPASE:    Lab Results   Component Value Date/Time    LIPASE 21.0 12/10/2022 01:01 PM     Fibrinogen Level: No components found for: FIB       BELOW MENTIONED RADIOLOGY STUDY RESULTS BY ME (AS NEEDED FOR MY EVALUATION AND MANAGEMENT). CT CHEST WO CONTRAST    Result Date: 2/11/2023  EXAMINATION: CT OF THE CHEST WITHOUT CONTRAST 2/11/2023 9:45 am TECHNIQUE: CT of the chest was performed without the administration of intravenous contrast. Multiplanar reformatted images are provided for review. Automated exposure control, iterative reconstruction, and/or weight based adjustment of the mA/kV was utilized to reduce the radiation dose to as low as reasonably achievable. COMPARISON: Chest x-ray dated 02/10/2023. CT chest dated 11/11/2022. HISTORY: ORDERING SYSTEM PROVIDED HISTORY: persistent cough, SOB with RUL infiltrate r/o PNA TECHNOLOGIST PROVIDED HISTORY: Reason for exam:->persistent cough, SOB with RUL infiltrate r/o PNA Reason for Exam: persistent cough, SOB with RUL infiltrate r/o PNA FINDINGS: Mediastinum: Heart is mildly enlarged. Pacemaker device is noted. Occlusion device noted in the left atrial appendage. There are a few scattered mildly prominent but nonenlarged mediastinal lymph nodes. Calcified lymph nodes noted in the mediastinum and right hilum. Lungs/pleura: There is a tiny right pleural effusion and trace left pleural fluid. There is calcified pleural plaque in the medial left base. There are mild-to-moderate patchy ground-glass and ill-defined nodular opacities in bilateral lungs, worse on the right, mildly increased when compared to 11/11/2022. No dense focal airspace consolidation. No pneumothorax. Upper Abdomen: No acute findings in the visualized upper abdomen. Soft Tissues/Bones: There is no acute or suspicious osseous abnormality. Visualized superficial soft tissues are within normal limits. 1.  Mild-to-moderate patchy ground-glass and ill-defined nodular opacities in bilateral lungs, worse on the right, mildly increased when compared to 11/11/2022.   This appearance is nonspecific and could be related to multifocal pneumonia, atypical/viral pneumonia, or pulmonary edema. 2.  Tiny right pleural effusion and trace left pleural fluid. XR CHEST PORTABLE    Result Date: 2/10/2023  EXAMINATION: ONE XRAY VIEW OF THE CHEST 2/10/2023 3:44 pm COMPARISON: 02/08/2023 HISTORY: ORDERING SYSTEM PROVIDED HISTORY: sob TECHNOLOGIST PROVIDED HISTORY: Reason for exam:->sob Reason for Exam: sob FINDINGS: Heart size is enlarged without change aorta is normal.  Lungs are normally expanded. Patchy opacities developing in the right upper lobe. Prominence of the perihilar regions but this is unchanged. Sabina Josef No pleural effusions. Mild spondylosis     Opacities developing in the right upper lobe        Imaging: [unfilled]         ======================================================================  Please note that this chart entry has been generated using using voice recognition software, mainly. So please excuse brevity and/or typos. While every effort and attempts have been made to ensure the accuracy of this automated transcription, some errors may have occurred; and certain words and phrases in transcription may not be entered as intended. However, inadvertent computerized transcription errors may be present. So please contact us if any clarification needed.

## 2023-02-15 ENCOUNTER — CLINICAL DOCUMENTATION ONLY (OUTPATIENT)
Facility: CLINIC | Age: 83
End: 2023-02-15

## 2023-02-15 VITALS
BODY MASS INDEX: 18.82 KG/M2 | TEMPERATURE: 97.5 F | RESPIRATION RATE: 16 BRPM | HEART RATE: 70 BPM | DIASTOLIC BLOOD PRESSURE: 54 MMHG | SYSTOLIC BLOOD PRESSURE: 157 MMHG | OXYGEN SATURATION: 98 % | WEIGHT: 106.2 LBS | HEIGHT: 63 IN

## 2023-02-15 LAB
ANION GAP SERPL CALCULATED.3IONS-SCNC: 9 MMOL/L (ref 3–16)
BASOPHILS ABSOLUTE: 0.1 K/UL (ref 0–0.2)
BASOPHILS RELATIVE PERCENT: 1.1 %
BUN BLDV-MCNC: 42 MG/DL (ref 7–20)
CALCIUM SERPL-MCNC: 9.1 MG/DL (ref 8.3–10.6)
CHLORIDE BLD-SCNC: 114 MMOL/L (ref 99–110)
CO2: 22 MMOL/L (ref 21–32)
CREAT SERPL-MCNC: 1.4 MG/DL (ref 0.6–1.2)
EOSINOPHILS ABSOLUTE: 0.2 K/UL (ref 0–0.6)
EOSINOPHILS RELATIVE PERCENT: 2 %
GFR SERPL CREATININE-BSD FRML MDRD: 37 ML/MIN/{1.73_M2}
GLUCOSE BLD-MCNC: 93 MG/DL (ref 70–99)
HCT VFR BLD CALC: 41 % (ref 36–48)
HEMOGLOBIN: 12.4 G/DL (ref 12–16)
LYMPHOCYTES ABSOLUTE: 0.8 K/UL (ref 1–5.1)
LYMPHOCYTES RELATIVE PERCENT: 8.7 %
MCH RBC QN AUTO: 23.2 PG (ref 26–34)
MCHC RBC AUTO-ENTMCNC: 30.2 G/DL (ref 31–36)
MCV RBC AUTO: 76.9 FL (ref 80–100)
MONOCYTES ABSOLUTE: 0.3 K/UL (ref 0–1.3)
MONOCYTES RELATIVE PERCENT: 3.3 %
NEUTROPHILS ABSOLUTE: 8.1 K/UL (ref 1.7–7.7)
NEUTROPHILS RELATIVE PERCENT: 84.9 %
PDW BLD-RTO: 19.8 % (ref 12.4–15.4)
PLATELET # BLD: 273 K/UL (ref 135–450)
PMV BLD AUTO: 10.2 FL (ref 5–10.5)
POTASSIUM SERPL-SCNC: 3.4 MMOL/L (ref 3.5–5.1)
RBC # BLD: 5.33 M/UL (ref 4–5.2)
SODIUM BLD-SCNC: 145 MMOL/L (ref 136–145)
WBC # BLD: 9.5 K/UL (ref 4–11)

## 2023-02-15 PROCEDURE — 36415 COLL VENOUS BLD VENIPUNCTURE: CPT

## 2023-02-15 PROCEDURE — 85025 COMPLETE CBC W/AUTO DIFF WBC: CPT

## 2023-02-15 PROCEDURE — 2580000003 HC RX 258: Performed by: INTERNAL MEDICINE

## 2023-02-15 PROCEDURE — 6360000002 HC RX W HCPCS: Performed by: FAMILY MEDICINE

## 2023-02-15 PROCEDURE — 92526 ORAL FUNCTION THERAPY: CPT

## 2023-02-15 PROCEDURE — 80048 BASIC METABOLIC PNL TOTAL CA: CPT

## 2023-02-15 PROCEDURE — 6360000002 HC RX W HCPCS: Performed by: INTERNAL MEDICINE

## 2023-02-15 PROCEDURE — 2580000003 HC RX 258: Performed by: FAMILY MEDICINE

## 2023-02-15 PROCEDURE — 6370000000 HC RX 637 (ALT 250 FOR IP): Performed by: FAMILY MEDICINE

## 2023-02-15 PROCEDURE — 6370000000 HC RX 637 (ALT 250 FOR IP): Performed by: INTERNAL MEDICINE

## 2023-02-15 RX ADMIN — LEVOTHYROXINE SODIUM 125 MCG: 0.03 TABLET ORAL at 07:46

## 2023-02-15 RX ADMIN — CEFTRIAXONE SODIUM 1000 MG: 1 INJECTION, POWDER, FOR SOLUTION INTRAMUSCULAR; INTRAVENOUS at 09:53

## 2023-02-15 RX ADMIN — TOPIRAMATE 100 MG: 100 TABLET, FILM COATED ORAL at 08:44

## 2023-02-15 RX ADMIN — SODIUM CHLORIDE: 9 INJECTION, SOLUTION INTRAVENOUS at 09:52

## 2023-02-15 RX ADMIN — CLOPIDOGREL BISULFATE 75 MG: 75 TABLET ORAL at 08:44

## 2023-02-15 RX ADMIN — HEPARIN SODIUM 5000 UNITS: 5000 INJECTION INTRAVENOUS; SUBCUTANEOUS at 08:44

## 2023-02-15 RX ADMIN — FAMOTIDINE 40 MG: 20 TABLET, FILM COATED ORAL at 08:42

## 2023-02-15 RX ADMIN — CARVEDILOL 25 MG: 25 TABLET, FILM COATED ORAL at 08:44

## 2023-02-15 RX ADMIN — TORSEMIDE 10 MG: 20 TABLET ORAL at 08:43

## 2023-02-15 RX ADMIN — SODIUM CHLORIDE, PRESERVATIVE FREE 10 ML: 5 INJECTION INTRAVENOUS at 09:54

## 2023-02-15 RX ADMIN — PREDNISONE 30 MG: 20 TABLET ORAL at 08:44

## 2023-02-15 NOTE — PROGRESS NOTES
MD Rachel Medina MD Sharlot Mering, MD                  Office: [phone]                      Fax: [phone]             [facility]                   NEPHROLOGY INPATIENT PROGRESS NOTE:     PATIENT NAME: Dallin Dumont  : 1940  MRN: [id number]      RECOMMENDATIONS:   -Resumed torsemide 10 mg a day, and as needed also - on discharge too.    -Low-dose K repletion given - not needed now   -Pneumonia management per primary team    -Most of her respiratory symptoms are likely from her underlying lung disease, she has not seen a lung doctor in the past, so I suggested to follow up w/ pulmonologist here.      -I have placed outpatient referral for pulmonary and updated follow-up provider for d/c.    -stable for discharge - pending social issues,    -Overall prognosis guarded, with cachexia,    Discussed with patient, her daughter, primary team       IMPRESSION:       Admitted on:  2/10/2023  2:59 PM   For:  Acute pulmonary edema ([facility] [address] [J81.0]  Dyspnea [R06.00]  Elevated serum creatinine [R79.89]  Elevated troponin [R77.8]  Pneumonia due to infectious organism, unspecified laterality, unspecified part of lung [J18.9]          GIANA (on nonproteinuric CKD: stage 3A):   - BL Scr-most recently 1.2-1.4 as of 12--> this admission peaked at 1.8  - Etiology of GIANA -prerenal  - other differentials: Unlikely a DNR GN / TI / TMA process  - UA : results reviewed: :       Associated problems:      Chronic-systolic heart failure-   Weight in past around 116-119 pounds,   Lowest 113 lbs  -now this admission 111,  pounds, likely cachexia,  -BMI 19          S/P [facility] 22 Dr Robert Welsh   IV contrast 61 cc  No LVEDP check          on CT -  A few subcentimeter hyperdense nodules are seen in  the right and left kidney, incompletely characterized on noncontrast study,  most likely hemorrhagic or proteinaceous cyst.  Simple appearing cysts are  seen in the right and left kidney   ->> Urology was consulted in past     Other major problems: Management per primary and other consulting teams. Hospital Problems             Last Modified POA    * (Principal) Pneumonia involving right lung 2/11/2023 Yes    Chronic renal failure, stage 3b ([facility] 2/11/2023 Yes    Coronary artery disease involving native heart without angina pectoris, unspecified vessel or lesion type 2/11/2023 Yes    Dyspnea 2/11/2023 Yes    Essential hypertension, benign 2/11/2023 Yes    Mixed hyperlipidemia (Chronic) 2/11/2023 Yes    Acquired hypothyroidism 2/11/2023 Yes    COPD (chronic obstructive pulmonary disease) ([facility] 2/11/2023 Yes    HTN (hypertension), benign 2/11/2023 Yes    PAF (paroxysmal atrial fibrillation) ([facility] 2/11/2023 Yes         : other supportive care :   - Check daily renal function panel with electrolytes-phosphorus  - Strict monitoring of I/Os, daily weight  - Renal feeds/diet  - Current medications reviewed. - Nephrotoxic medications have been discontinued. - Dose adjusted and appropriate. - Dose meds for eGFR <15 mL/min/1.73m2 during GIANA    - Avoid heavy opioids due to renal failure - may use very low dose dilaudid / fentanyl with close monitoring of CNS and respiratory depression. Please refer to the orders. High Complexity. Multiple complex problems. Discussed with patient and treatment team-    Time spent > 30 (~35) minutes. Thank you for allowing me to participate in this patient's care. Please do not hesitate to contact me anytime. We will follow along with you.        Kristen Cabrera MD,  [facility] [address] [place]: [phone] or [address] [facility]  Fax: [phone]        =======================================================================================   =======================================================================================    Subjective / interval history:   Patient was seen comfortably sitting up  in the bed ,   Reported no  active complaints,   Renal function better  W/o hypoxia on RA. Chart reviewed, patient's pertinent electronic medical records , Medical history and medication reviewed as needed for my evaulation. Past medical, Surgical, Social, Family medical history reviewed by me. MEDICATIONS: reviewed by me. Medications Prior to Admission:  No current facility-administered medications on file prior to encounter. Current Outpatient Medications on File Prior to Encounter   Medication Sig Dispense Refill    albuterol sulfate HFA (PROVENTIL HFA) 108 (90 Base) MCG/ACT inhaler Inhale 2 puffs into the lungs every 4 hours as needed for Wheezing or Shortness of Breath (Space out to every 6 hours as symptoms improve) Space out to every 6 hours as symptoms improve.  18 g 0    famotidine (PEPCID) 40 MG tablet Take 1 tablet by mouth 2 times daily (Patient not taking: No sig reported) 60 tablet 0    predniSONE (DELTASONE) 10 MG tablet 1 TID for 3 day then 1 BID (Patient not taking: Reported on 2/10/2023) 15 tablet 0    nitroGLYCERIN (NITROLINGUAL) 0.4 MG/SPRAY 0.4 mg spray Place 1 spray under the tongue every 5 minutes as needed for Chest pain 4.9 g 1    torsemide (DEMADEX) 20 MG tablet 10 mg every day except Sunday and as directed for weight gain 60 tablet 1    clopidogrel (PLAVIX) 75 MG tablet Take 1 tablet by mouth daily 90 tablet 2    carvedilol (COREG) 25 MG tablet Take 1 tablet by mouth 2 times daily (with meals) (Patient taking differently: Take 25 mg by mouth 2 times daily) 180 tablet 3    rosuvastatin (CRESTOR) 5 MG tablet Take 1 tablet by mouth nightly 90 tablet 1    levothyroxine (SYNTHROID) 125 MCG tablet Take 1 tablet by mouth Daily 90 tablet 0    aspirin EC 81 MG EC tablet Take 1 tablet by mouth daily 90 tablet 1    topiramate (TOPAMAX) 50 MG tablet TAKE 2 TABLETS TWICE DAILY 360 tablet 0    vitamin D (ERGOCALCIFEROL) 1.25 MG (49522 UT) CAPS capsule TAKE 1 CAPSULE ONCE A WEEK 12 capsule 1    allopurinol (ZYLOPRIM) 100 MG tablet TAKE 1 TABLET EVERY DAY 90 tablet 1    fluticasone (FLONASE) 50 MCG/ACT nasal spray 2 sprays by Each Nostril route daily 16 g 0         Current Facility-Administered Medications:     melatonin tablet 3 mg, 3 mg, Oral, Nightly PRN, TIFFANIE Welch CNP    HYDROcodone-acetaminophen (NORCO) 5-325 MG per tablet 1 tablet, 1 tablet, Oral, Q6H PRN, TIFFANIE Welch CNP    cefTRIAXone (ROCEPHIN) 1,000 mg in sodium chloride 0.9 % 50 mL IVPB (mini-bag), 1,000 mg, IntraVENous, Q24H, Ruben Valles MD, Stopped at 02/15/23 1046    predniSONE (DELTASONE) tablet 30 mg, 30 mg, Oral, Daily, Ruben Valles MD, 30 mg at 02/15/23 0844    albuterol sulfate HFA (PROVENTIL;VENTOLIN;PROAIR) 108 (90 Base) MCG/ACT inhaler 2 puff, 2 puff, Inhalation, Q4H PRN, Poa Buck MD    carvedilol (COREG) tablet 25 mg, 25 mg, Oral, BID WC, Alycia Johnson MD, 25 mg at 02/15/23 0844    clopidogrel (PLAVIX) tablet 75 mg, 75 mg, Oral, Daily, Alycia Johnson MD, 75 mg at 02/15/23 0844    famotidine (PEPCID) tablet 40 mg, 40 mg, Oral, BID, Pao Buck MD, 40 mg at 02/15/23 0842    levothyroxine (SYNTHROID) tablet 125 mcg, 125 mcg, Oral, Daily, Alycia Johnson MD, 125 mcg at 02/15/23 0746    nitroGLYCERIN (NITROLINGUAL) 0.4 mg spray 1 spray, 1 spray, SubLINGual, Q5 Min PRN, Alycia Johnson MD    rosuvastatin (CRESTOR) tablet 5 mg, 5 mg, Oral, Nightly, Alycia Johnson MD, 5 mg at 02/14/23 2224    topiramate (TOPAMAX) tablet 100 mg, 100 mg, Oral, BID, Alycia Johnson MD, 100 mg at 02/15/23 0844    torsemide (DEMADEX) tablet 10 mg, 10 mg, Oral, Daily, Alycia Johnson MD, 10 mg at 02/15/23 0843    sodium chloride flush 0.9 % injection 5-40 mL, 5-40 mL, IntraVENous, 2 times per day, Pao Buck MD, 10 mL at 02/15/23 0954    sodium chloride flush 0.9 % injection 5-40 mL, 5-40 mL, IntraVENous, PRN, Alycia Johnson MD    0.9 % sodium chloride infusion, , IntraVENous, PRN, Pao Buck MD, Last Rate: 5 mL/hr at 02/15/23 0952, New Bag at 02/15/23 0952    heparin (porcine) injection 5,000 Units, 5,000 Units, SubCUTAneous, BID, Javier Hawthorne MD, 5,000 Units at 02/15/23 0844    ondansetron (ZOFRAN-ODT) disintegrating tablet 4 mg, 4 mg, Oral, Q8H PRN **OR** ondansetron (ZOFRAN) injection 4 mg, 4 mg, IntraVENous, Q6H PRN, Javier Hawthorne MD    acetaminophen (TYLENOL) tablet 650 mg, 650 mg, Oral, Q6H PRN **OR** acetaminophen (TYLENOL) suppository 650 mg, 650 mg, Rectal, Q6H PRN, Javier Hawthorne MD    Current Outpatient Medications:     azithromycin (ZITHROMAX) 250 MG tablet, Take 1 tablet by mouth daily for 3 days, Disp: 3 tablet, Rfl: 0    albuterol sulfate HFA (PROVENTIL HFA) 108 (90 Base) MCG/ACT inhaler, Inhale 2 puffs into the lungs every 4 hours as needed for Wheezing or Shortness of Breath (Space out to every 6 hours as symptoms improve) Space out to every 6 hours as symptoms improve., Disp: 18 g, Rfl: 0    famotidine (PEPCID) 40 MG tablet, Take 1 tablet by mouth 2 times daily (Patient not taking: No sig reported), Disp: 60 tablet, Rfl: 0    predniSONE (DELTASONE) 10 MG tablet, 1 TID for 3 day then 1 BID (Patient not taking: Reported on 2/10/2023), Disp: 15 tablet, Rfl: 0    nitroGLYCERIN (NITROLINGUAL) 0.4 MG/SPRAY 0.4 mg spray, Place 1 spray under the tongue every 5 minutes as needed for Chest pain, Disp: 4.9 g, Rfl: 1    torsemide (DEMADEX) 20 MG tablet, 10 mg every day except Sunday and as directed for weight gain, Disp: 60 tablet, Rfl: 1    clopidogrel (PLAVIX) 75 MG tablet, Take 1 tablet by mouth daily, Disp: 90 tablet, Rfl: 2    carvedilol (COREG) 25 MG tablet, Take 1 tablet by mouth 2 times daily (with meals) (Patient taking differently: Take 25 mg by mouth 2 times daily), Disp: 180 tablet, Rfl: 3    rosuvastatin (CRESTOR) 5 MG tablet, Take 1 tablet by mouth nightly, Disp: 90 tablet, Rfl: 1    levothyroxine (SYNTHROID) 125 MCG tablet, Take 1 tablet by mouth Daily, Disp: 90 tablet, Rfl: 0    aspirin EC 81 MG EC tablet, Take 1 tablet by mouth daily, Disp: 90 tablet, Rfl: 1    topiramate (TOPAMAX) 50 MG tablet, TAKE 2 TABLETS TWICE DAILY, Disp: 360 tablet, Rfl: 0    vitamin D (ERGOCALCIFEROL) 1.25 MG (37809 UT) CAPS capsule, TAKE 1 CAPSULE ONCE A WEEK, Disp: 12 capsule, Rfl: 1    allopurinol (ZYLOPRIM) 100 MG tablet, TAKE 1 TABLET EVERY DAY, Disp: 90 tablet, Rfl: 1    fluticasone (FLONASE) 50 MCG/ACT nasal spray, 2 sprays by Each Nostril route daily, Disp: 16 g, Rfl: 0         REVIEW OF SYSTEMS:  As mentioned in chief complaint and HPI , Subjective         =======================================================================================     PHYSICAL EXAM:  Recent vital signs and recent I/Os reviewed by me.      Wt Readings from Last 3 Encounters:   02/15/23 106 lb 3.2 oz (48.2 kg)   02/09/23 111 lb 12.8 oz (50.7 kg)   02/08/23 116 lb (52.6 kg)     BP Readings from Last 3 Encounters:   02/15/23 (!) 157/54   02/09/23 (!) 136/57   02/09/23 (!) 130/56     Patient Vitals for the past 24 hrs:   BP Temp Temp src Pulse Resp SpO2 Weight   02/15/23 1415 -- -- -- 70 -- -- --   02/15/23 1400 -- -- -- 75 -- -- --   02/15/23 1345 -- -- -- 82 -- -- --   02/15/23 1330 -- -- -- 73 -- -- --   02/15/23 1315 -- -- -- 78 -- -- --   02/15/23 1300 -- -- -- 78 -- -- --   02/15/23 1245 (!) 157/54 -- -- 73 -- -- --   02/15/23 1230 (!) 157/54 97.5 °F (36.4 °C) Temporal 82 16 98 % --   02/15/23 1215 -- -- -- 74 -- -- --   02/15/23 1200 -- -- -- 81 -- -- --   02/15/23 1145 -- -- -- 80 -- -- --   02/15/23 1130 -- -- -- 83 -- -- --   02/15/23 1115 -- -- -- 83 -- -- --   02/15/23 1100 -- -- -- 79 -- -- --   02/15/23 1045 -- -- -- 83 -- -- --   02/15/23 1030 -- -- -- 78 -- -- --   02/15/23 1015 -- -- -- 78 -- -- --   02/15/23 1000 -- -- -- 82 -- -- --   02/15/23 0945 -- -- -- 74 -- -- --   02/15/23 0930 -- -- -- 78 -- -- --   02/15/23 0915 -- -- -- 82 -- -- --   02/15/23 0900 -- -- -- 75 -- -- --   02/15/23 0845 (!) 135/41 -- -- 80 -- -- --   02/15/23 0830 (!) 135/41 97.8 °F (36.6 °C) Temporal 74 18 100 % --   02/15/23 0815 -- -- -- 78 -- -- --   02/15/23 0800 -- -- -- 82 -- -- --   02/15/23 0745 -- -- -- 86 -- -- --   02/15/23 0730 -- -- -- 70 -- -- --   02/15/23 0715 -- -- -- 71 -- -- --   02/15/23 0700 -- -- -- 79 -- -- --   02/15/23 0407 (!) 122/46 97.5 °F (36.4 °C) Temporal 70 18 97 % 106 lb 3.2 oz (48.2 kg)   02/14/23 2355 (!) 148/58 98 °F (36.7 °C) Temporal 74 16 96 % --   02/14/23 1825 (!) 148/102 -- -- 88 -- -- --   02/14/23 1546 136/66 98.8 °F (37.1 °C) Temporal 70 16 100 % --       Intake/Output Summary (Last 24 hours) at 2/15/2023 1517  Last data filed at 2/14/2023 2225  Gross per 24 hour   Intake 10 ml   Output --   Net 10 ml       Physical exam:  General: Awake, Alert,  cachectic  HENT: Atraumatic, normocephalic   Eyes: Normal conjunctiva, Non-incteral sclera. Neck: Supple, JVD not visible. CVS:  Heart sounds are normal. No loud murmur. RS: Normal respiratory effort, Breat sound: diminished at bases. Abd: Soft , bowel sounds are normal, no distension and no tenderness . Skin: No rash , some bruises,   CNS: Awake Oriented , No focal.   Extremities/MSK: trace Edema, no cyanosis.         =======================================================================================     DATA:  Diagnostic tests reviewed by me for today's visit:   (AS NEEDED FOR MY EVALUATION AND MANAGEMENT).        Recent Labs     02/13/23  0444 02/14/23  0450 02/15/23  1020   WBC 7.2 6.8 9.5   HCT 38.8 39.2  38.5 41.0    253 273     Iron Saturation:  No components found for: PERCENTFE  FERRITIN:    Lab Results   Component Value Date/Time    FERRITIN 92.7 02/14/2023 04:50 AM     IRON:    Lab Results   Component Value Date/Time    IRON 53 02/14/2023 04:50 AM     TIBC:    Lab Results   Component Value Date/Time    TIBC 251 02/14/2023 04:50 AM       Recent Labs     02/13/23  0444 02/14/23  0450 02/15/23  1020    144 145   K 3.2* 3.7 3.4*    112* 114*   CO2 22 22 22   BUN 60* 46* 42*   CREATININE 1.7* 1.5* 1.4*     Recent Labs     02/13/23  0444 02/14/23  0450 02/15/23  1020   CALCIUM 8.2* 8.4 9.1     No results for input(s): PH, PCO2, PO2 in the last 72 hours.     Invalid input(s): Letty Rumps    ABG:  No results found for: PH, PCO2, PO2, HCO3, BE, THGB, TCO2, O2SAT  VBG:    Lab Results   Component Value Date/Time    PHVEN 7.303 09/15/2022 11:04 AM    DMV6QEU 38.8 09/15/2022 11:04 AM    BEVEN -6.7 09/15/2022 11:04 AM    N4LIGFEZ 97 09/15/2022 11:04 AM       LDH:  No results found for: LDH  Uric Acid:    Lab Results   Component Value Date/Time    LABURIC 4.8 05/28/2019 12:31 PM       PT/INR:    Lab Results   Component Value Date/Time    PROTIME 15.9 11/12/2022 02:35 AM    INR 1.27 11/12/2022 02:35 AM     Warfarin PT/INR:  No components found for: PTPATWAR, PTINRWAR  PTT:    Lab Results   Component Value Date/Time    APTT 42.5 01/22/2022 02:16 PM   [APTT}  Last 3 Troponin:    Lab Results   Component Value Date/Time    TROPONINI 0.07 02/11/2023 04:57 AM    TROPONINI 0.07 02/10/2023 04:30 PM    TROPONINI 0.04 02/08/2023 10:37 PM       U/A:    Lab Results   Component Value Date/Time    COLORU Yellow 02/10/2023 09:07 PM    PROTEINU Negative 02/10/2023 09:07 PM    PHUR 5.0 02/10/2023 09:07 PM    WBCUA 1 02/10/2023 09:07 PM    RBCUA 1 02/10/2023 09:07 PM    YEAST neg 03/09/2021 02:00 PM    BACTERIA None Seen 02/10/2023 09:07 PM    CLARITYU CLOUDY 02/10/2023 09:07 PM    SPECGRAV 1.010 02/10/2023 09:07 PM    LEUKOCYTESUR Negative 02/10/2023 09:07 PM    UROBILINOGEN 0.2 02/10/2023 09:07 PM    BILIRUBINUR Negative 02/10/2023 09:07 PM    BILIRUBINUR NEGATIVE 12/04/2011 06:40 PM    BLOODU Negative 02/10/2023 09:07 PM    GLUCOSEU Negative 02/10/2023 09:07 PM    GLUCOSEU NEGATIVE 12/04/2011 06:40 PM     Microalbumen/Creatinine ratio:  No components found for: RUCREAT  24 Hour Urine for Protein:  No components found for: RAWUPRO, UHRS3, CJYK07VR, UTV3  24 Hour Urine for Creatinine Clearance:  No components found for: CREAT4, UHRS10, UTV10  Urine Toxicology:  No components found for: Kiara Bread, IBENZO, ICOCAINE, IMARTHC, IOPIATES, IPHENCYC    HgBA1c:    Lab Results   Component Value Date/Time    LABA1C 5.5 01/23/2022 04:22 AM     RPR:  No results found for: RPR  HIV:  No results found for: HIV  REMY:  No results found for: ANATITER, REMY  RF:  No results found for: RF  DSDNA:  No components found for: DNA  AMYLASE:  No results found for: AMYLASE  LIPASE:    Lab Results   Component Value Date/Time    LIPASE 21.0 12/10/2022 01:01 PM     Fibrinogen Level:  No components found for: FIB       BELOW MENTIONED RADIOLOGY STUDY RESULTS BY ME (AS NEEDED FOR MY EVALUATION AND MANAGEMENT). CT CHEST WO CONTRAST    Result Date: 2/11/2023  EXAMINATION: CT OF THE CHEST WITHOUT CONTRAST 2/11/2023 9:45 am TECHNIQUE: CT of the chest was performed without the administration of intravenous contrast. Multiplanar reformatted images are provided for review. Automated exposure control, iterative reconstruction, and/or weight based adjustment of the mA/kV was utilized to reduce the radiation dose to as low as reasonably achievable. COMPARISON: Chest x-ray dated 02/10/2023. CT chest dated 11/11/2022. HISTORY: ORDERING SYSTEM PROVIDED HISTORY: persistent cough, SOB with RUL infiltrate r/o PNA TECHNOLOGIST PROVIDED HISTORY: Reason for exam:->persistent cough, SOB with RUL infiltrate r/o PNA Reason for Exam: persistent cough, SOB with RUL infiltrate r/o PNA FINDINGS: Mediastinum: Heart is mildly enlarged. Pacemaker device is noted. Occlusion device noted in the left atrial appendage. There are a few scattered mildly prominent but nonenlarged mediastinal lymph nodes. Calcified lymph nodes noted in the mediastinum and right hilum. Lungs/pleura: There is a tiny right pleural effusion and trace left pleural fluid. There is calcified pleural plaque in the medial left base.   There are mild-to-moderate patchy ground-glass and ill-defined nodular opacities in bilateral lungs, worse on the right, mildly increased when compared to 11/11/2022. No dense focal airspace consolidation. No pneumothorax. Upper Abdomen: No acute findings in the visualized upper abdomen. Soft Tissues/Bones: There is no acute or suspicious osseous abnormality. Visualized superficial soft tissues are within normal limits. 1.  Mild-to-moderate patchy ground-glass and ill-defined nodular opacities in bilateral lungs, worse on the right, mildly increased when compared to 11/11/2022. This appearance is nonspecific and could be related to multifocal pneumonia, atypical/viral pneumonia, or pulmonary edema. 2.  Tiny right pleural effusion and trace left pleural fluid. XR CHEST PORTABLE    Result Date: 2/10/2023  EXAMINATION: ONE XRAY VIEW OF THE CHEST 2/10/2023 3:44 pm COMPARISON: 02/08/2023 HISTORY: ORDERING SYSTEM PROVIDED HISTORY: sob TECHNOLOGIST PROVIDED HISTORY: Reason for exam:->sob Reason for Exam: sob FINDINGS: Heart size is enlarged without change aorta is normal.  Lungs are normally expanded. Patchy opacities developing in the right upper lobe. Prominence of the perihilar regions but this is unchanged. Paullette Rakes No pleural effusions. Mild spondylosis     Opacities developing in the right upper lobe        Imaging: [unfilled]         ======================================================================  Please note that this chart entry has been generated using using voice recognition software, mainly. So please excuse brevity and/or typos. While every effort and attempts have been made to ensure the accuracy of this automated transcription, some errors may have occurred; and certain words and phrases in transcription may not be entered as intended. However, inadvertent computerized transcription errors may be present. So please contact us if any clarification needed.

## 2023-02-15 NOTE — PROGRESS NOTES
Physician Progress Note      Mi Valdez  CSN #:                  [id number]  :                       1940  ADMIT DATE:       2/10/2023 2:59 PM  [address] [place] DATE:  RESPONDING  PROVIDER #:        Cathryn Bower MD          QUERY TEXT:    Pt admitted with Pneumonia and has Acute on Chronic CHF documented in H&P. If   possible, please document in progress notes and discharge summary further   specificity regarding the type and acuity of CHF:    The medical record reflects the following:  Risk Factors: SOB, PMH CHF,  Clinical Indicators: ProBNP 99981  H&P note documented \"Acute on Chronic CHF. \"   Internal Medicine progress note documented \"I am less convinced this is   CHF. Noted creatinine bump. Discontinue IV Lasix and continue home   torsemide. \"   Nephrology note documented \"Chronic-systolic heart failure. \"  Treatment: IV lasix, Torsemide,  Options provided:  -- Acute on Chronic Systolic CHF/HFrEF  -- Chronic Systolic CHF/HFrEF  -- Other - I will add my own diagnosis  -- Disagree - Not applicable / Not valid  -- Disagree - Clinically unable to determine / Unknown  -- Refer to Clinical Documentation Reviewer    PROVIDER RESPONSE TEXT:    This patient has chronic systolic CHF/HFrEF.     Query created by: Keri Mckenzie on 2023 5:45 PM      Electronically signed by:  Cathryn Bower MD 2/15/2023 10:29 AM

## 2023-02-15 NOTE — PROGRESS NOTES
Hospitalist Progress Note      PCP: Bhakti Welsh MD    Date of Admission: 2/10/2023    Chief Complaint: Fatigue, dyspnea      Subjective:   Dyspnea and intermittent cough improving. Weakness slowly getting better  Diarrhea overnight; improving      Medications:  Reviewed    Infusion Medications    sodium chloride 5 mL/hr at 02/15/23 4438     Scheduled Medications    cefTRIAXone (ROCEPHIN) IV  1,000 mg IntraVENous Q24H    predniSONE  30 mg Oral Daily    carvedilol  25 mg Oral BID WC    clopidogrel  75 mg Oral Daily    famotidine  40 mg Oral BID    levothyroxine  125 mcg Oral Daily    rosuvastatin  5 mg Oral Nightly    topiramate  100 mg Oral BID    torsemide  10 mg Oral Daily    sodium chloride flush  5-40 mL IntraVENous 2 times per day    heparin (porcine)  5,000 Units SubCUTAneous BID     PRN Meds: melatonin, HYDROcodone 5 mg - acetaminophen, albuterol sulfate HFA, nitroGLYCERIN, sodium chloride flush, sodium chloride, ondansetron **OR** ondansetron, acetaminophen **OR** acetaminophen      Intake/Output Summary (Last 24 hours) at 2/15/2023 1348  Last data filed at 2/14/2023 2225  Gross per 24 hour   Intake 10 ml   Output --   Net 10 ml         Exam:    BP (!) 157/54   Pulse 82   Temp 97.5 °F (36.4 °C) (Temporal)   Resp 16   Ht 5' 3\" (1.6 m)   Wt 106 lb 3.2 oz (48.2 kg)   SpO2 98%   BMI 18.81 kg/m²     Gen/overall appearance: Not in acute distress. Alert. Head: Normocephalic, atraumatic  Eyes: EOMI, no scleral icterus  CVS: regular rate and rhythm, Normal S1S2  Pulm: Clear to auscultation bilaterally. No crackles/wheezes  Extremities: No edema.  No erythema or warmth  Neuro: No gross focal deficits noted  Skin: Warm, dry    Labs:   Recent Labs     02/13/23  0444 02/14/23  0450 02/15/23  1020   WBC 7.2 6.8 9.5   HGB 11.7* 11.6* 12.4   HCT 38.8 39.2  38.5 41.0    253 273       Recent Labs     02/13/23  0444 02/14/23  0450 02/15/23  1020    144 145   K 3.2* 3.7 3.4*    112* 114* CO2 22 22 22   BUN 60* 46* 42*   CREATININE 1.7* 1.5* 1.4*   CALCIUM 8.2* 8.4 9.1       No results for input(s): AST, ALT, BILIDIR, BILITOT, ALKPHOS in the last 72 hours. No results for input(s): INR in the last 72 hours. No results for input(s): Magdalene Diane in the last 72 hours. Assessment/Plan:    Active Hospital Problems    Diagnosis Date Noted    Pneumonia involving right lung [J18.9] 02/11/2023     Priority: Medium    Dyspnea [R06.00] 02/10/2023     Priority: Medium    Coronary artery disease involving native heart without angina pectoris, unspecified vessel or lesion type [I25.10] 10/21/2022     Priority: Medium    Chronic renal failure, stage 3b ([facility] [address] [N18.32] 06/28/2022     Priority: Medium    PAF (paroxysmal atrial fibrillation) ([facility]) [I48.0]     HTN (hypertension), benign [I10]     [facility] disease[facility] [J44.9] 02/14/2013    Acquired hypothyroidism [E03.9]     Mixed hyperlipidemia [E78.2] 05/16/2011    Essential hypertension, benign [I10] 05/16/2011       RUL CAP Pneumonia  - CT chest noted for right upper lobe infiltrate suggestive of pneumonia. Elevated CRP. Procalcitonin 0.15  - s/p IV Rocephin and Zithromax course  - Added Mucinex  - Continue Prednisone  - O2 per protocol  - po azithro on d/c     Mild acute COPD exacerbation. Likely 2/2 above  - on prednisone course  - med nebs  - respiratory care  - abx as per above    Diarrhea with questionable stool incontinence. Possibly abx induced? History of C diff  - repeat C.  Diff testing  - stool studies  - trial of imodium if neg  - GI following     PMH of CAD, CKD 3b, htn, hld, hypothyroidism, PAF      DVT Prophylaxis: heparin  Diet: ADULT DIET; Easy to Chew; Low Sodium (2 gm)  Code Status: Full Code    Dispo - Declines SNF; home on d/c    Mary Castle MD

## 2023-02-15 NOTE — CARE COORDINATION
Discharge note:      CM/SW has been notified of discharge. Patient noted to have the following needs at discharge. CM/SW has coordinated the following services: [facility]. Khoi Frye [address] [place] [address] Zohreh Callahan, [address]  Phone: [phone]  Fax: [phone]        Discharge Destination: Home. Transportation: Daughter to transport home. Comment: IMM completed yesterday. All CM/SW needs met, will sign off.       Electronically signed by BRIANNA Lora on 2/15/2023 at 2:17 PM

## 2023-02-15 NOTE — PROGRESS NOTES
Facility/Department: [address]  Speech Language Pathology   Dysphagia Treatment Note    Patient: Dena Leslie   : 1940   MRN: [id number]      Evaluation Date: 2/15/2023      Admitting Dx: Acute pulmonary edema ([address] [J81.0]  Dyspnea [R06.00]  Elevated serum creatinine [R79.89]  Elevated troponin [R77.8]  Pneumonia due to infectious organism, unspecified laterality, unspecified part of lung [J18.9]  Treatment Diagnosis: Oropharyngeal Dysphagia   Pain: Denies                                              Diet and Treatment Recommendations 2/15/2023:  Diet Solids Recommendation:  Easy to chew  Liquid Consistency Recommendation: Thin liquids  Recommended form of Meds: Meds whole with water or Meds in puree      Compensatory strategies:   Upright as possible with all PO intake , Small bites/sips , Eat/feed slowly, Remain upright 30-45 min     Assessment of Texture Tolerance:  Diet level prior to treatment: Easy to chew , Thin liquids   Tolerance of Current Diet Level:Per chart, no noted difficulty with current diet level  RN reported pt appears to be tolerating current diet level     Impressions: Pt was positioned Upright in chair, awake and alert. Currently on room air. Limited trials of thin liquids and soft solids were provided to assess swallow function. Pt presenting with no anterior loss, adequate oral prep, AP transit, and mastication, good oral clearance, slightly delayed swallow. No overt s/s associated with aspiration noted with trials of thin liquids via straw. Pt demonstrated an intermittent cough prior to and during trials. Unable to discern baseline cough vs s/s of aspiration therefore recommend use of compensatory strategies to maximize safety. Pt declined trials of regular solids d/t nereida crackers being too hard to chew with dentures. Pt demonstrates increased risk for aspiration due to cognitive state , respiratory status , and weak cough .  Based on today's assessment recommend Easy to chew with Thin liquids , Meds whole with water or Meds in puree . Dysphagia Goals:   Pt will functionally tolerate recommended diet with no overt clinical s/s of aspiration (Ongoing 02/15/23)  Pt will demonstrate understanding of aspiration risk and precautions via education/demonstration with occasional prompting (Ongoing 02/15/23)  Pt will advance to least restrictive diet as indicated (Ongoing 02/15/23)    Plan:   1-2 times to ensure diet tolerance. Patient/Family Education:  Provided education regarding role of SLP, recommendations and general speech pathology plan of care. [x] Pt verbalized understanding and agreement   [] Pt requires ongoing learning   [] No evidence of comprehension     Discharge Recommendations:    Discharge recommendations to be determined pending ongoing follow-up during acute care stay    Treatment time:  Timed Code Treatment Minutes: 0  Total Treatment time: 12 minutes    If patient discharges prior to next session this note will serve as a discharge summary.      Signature:   Damian Simon. CF- SLP  Speech Language Pathologist  [facility].[id number]

## 2023-02-15 NOTE — PLAN OF CARE
Problem: Discharge Planning  Goal: Discharge to home or other facility with appropriate resources  2/15/2023 1417 by Andrew Goldman, RN  Outcome: Adequate for Discharge  2/15/2023 1416 by Andrew Goldman, RN  Outcome: Adequate for Discharge  2/15/2023 1416 by Andrew Goldman, RN  Outcome: Adequate for Discharge  2/15/2023 1209 by Andrew Goldman, RN  Outcome: Progressing  Flowsheets (Taken 2/15/2023 0830)  Discharge to home or other facility with appropriate resources:   Identify barriers to discharge with patient and caregiver   Arrange for needed discharge resources and transportation as appropriate     Problem: Safety - Adult  Goal: Free from fall injury  2/15/2023 1417 by Andrew Goldman, RN  Outcome: Adequate for Discharge  2/15/2023 1416 by Andrew Goldman, RN  Outcome: Adequate for Discharge  2/15/2023 1416 by Andrew Goldman, RN  Outcome: Adequate for Discharge  2/15/2023 1209 by Andrew Goldman, RN  Outcome: Progressing  Flowsheets (Taken 2/15/2023 0800)  Free From Fall Injury: Instruct family/caregiver on patient safety     Problem: ABCDS Injury Assessment  Goal: Absence of physical injury  2/15/2023 1417 by Andrew Goldman, RN  Outcome: Adequate for Discharge  2/15/2023 1416 by Andrew Goldman, RN  Outcome: Adequate for Discharge  2/15/2023 1416 by Andrew Section, RN  Outcome: Adequate for Discharge  2/15/2023 1209 by Andrew Goldman, RN  Outcome: Progressing  Flowsheets (Taken 2/15/2023 0800)  Absence of Physical Injury: Implement safety measures based on patient assessment     Problem: Chronic Conditions and Co-morbidities  Goal: Patient's chronic conditions and co-morbidity symptoms are monitored and maintained or improved  2/15/2023 1417 by Andrew Goldman, RN  Outcome: Adequate for Discharge  2/15/2023 1416 by Andrew Goldman, RN  Outcome: Adequate for Discharge  2/15/2023 1416 by Andrew Goldman, RN  Outcome: Adequate for Discharge  2/15/2023 1209 by Andrew Goldman, RN  Outcome: Progressing  Flowsheets (Taken 2/15/2023 0830)  Care Plan - Patient's Chronic Conditions and Co-Morbidity Symptoms are Monitored and Maintained or Improved:   Monitor and assess patient's chronic conditions and comorbid symptoms for stability, deterioration, or improvement   Collaborate with multidisciplinary team to address chronic and comorbid conditions and prevent exacerbation or deterioration     Problem: Pain  Goal: Verbalizes/displays adequate comfort level or baseline comfort level  2/15/2023 1417 by Hal Heart RN  Outcome: Adequate for Discharge  2/15/2023 1416 by Hal Heart RN  Outcome: Adequate for Discharge  2/15/2023 1416 by Hal Heart RN  Outcome: Adequate for Discharge  Flowsheets (Taken 2/15/2023 1230)  Verbalizes/displays adequate comfort level or baseline comfort level: Encourage patient to monitor pain and request assistance  2/15/2023 1209 by Hal Heart RN  Outcome: Progressing

## 2023-02-15 NOTE — PLAN OF CARE
Problem: Discharge Planning  Goal: Discharge to home or other facility with appropriate resources  2/15/2023 1417 by Gabriele Harp RN  Outcome: Adequate for Discharge  2/15/2023 1416 by Gabriele Harp RN  Outcome: Adequate for Discharge  2/15/2023 1416 by Gabriele Harp RN  Outcome: Adequate for Discharge  2/15/2023 1209 by Gabriele Harp RN  Outcome: Progressing  Flowsheets (Taken 2/15/2023 0830)  Discharge to home or other facility with appropriate resources:   Identify barriers to discharge with patient and caregiver   Arrange for needed discharge resources and transportation as appropriate     Problem: Safety - Adult  Goal: Free from fall injury  2/15/2023 1417 by Gabriele Harp RN  Outcome: Adequate for Discharge  2/15/2023 1416 by Gabriele Harp RN  Outcome: Adequate for Discharge  2/15/2023 1416 by Gabriele Harp RN  Outcome: Adequate for Discharge  2/15/2023 1209 by Gabriele Harp RN  Outcome: Progressing  Flowsheets (Taken 2/15/2023 0800)  Free From Fall Injury: Instruct family/caregiver on patient safety     Problem: ABCDS Injury Assessment  Goal: Absence of physical injury  2/15/2023 1417 by Gabriele Harp RN  Outcome: Adequate for Discharge  2/15/2023 1416 by Gabriele Harp RN  Outcome: Adequate for Discharge  2/15/2023 1416 by Gabriele Harp RN  Outcome: Adequate for Discharge  2/15/2023 1209 by Gabriele Harp RN  Outcome: Progressing  Flowsheets (Taken 2/15/2023 0800)  Absence of Physical Injury: Implement safety measures based on patient assessment     Problem: Chronic Conditions and Co-morbidities  Goal: Patient's chronic conditions and co-morbidity symptoms are monitored and maintained or improved  2/15/2023 1417 by Gabriele Harp RN  Outcome: Adequate for Discharge  2/15/2023 1416 by Gabriele Harp RN  Outcome: Adequate for Discharge  2/15/2023 1416 by Gabriele Harp RN  Outcome: Adequate for Discharge  2/15/2023 1209 by Gabriele Harp RN  Outcome: Progressing  Flowsheets (Taken 2/15/2023 0830)  Care Plan - Patient's Chronic Conditions and Co-Morbidity Symptoms are Monitored and Maintained or Improved:   Monitor and assess patient's chronic conditions and comorbid symptoms for stability, deterioration, or improvement   Collaborate with multidisciplinary team to address chronic and comorbid conditions and prevent exacerbation or deterioration     Problem: Pain  Goal: Verbalizes/displays adequate comfort level or baseline comfort level  2/15/2023 1417 by Anai Shi RN  Outcome: Adequate for Discharge  2/15/2023 1416 by Anai Shi RN  Outcome: Adequate for Discharge  2/15/2023 1416 by Anai Shi RN  Outcome: Adequate for Discharge  Flowsheets (Taken 2/15/2023 1230)  Verbalizes/displays adequate comfort level or baseline comfort level: Encourage patient to monitor pain and request assistance  2/15/2023 1209 by Anai Shi RN  Outcome: Progressing

## 2023-02-15 NOTE — PLAN OF CARE
Problem: Discharge Planning  Goal: Discharge to home or other facility with appropriate resources  2/15/2023 1417 by Marianela Sykes RN  Outcome: Adequate for Discharge  2/15/2023 1416 by Marianela Sykes RN  Outcome: Adequate for Discharge  2/15/2023 1416 by Marianela Sykes RN  Outcome: Adequate for Discharge  2/15/2023 1209 by Marianela Sykes RN  Outcome: Progressing  Flowsheets (Taken 2/15/2023 0830)  Discharge to home or other facility with appropriate resources:   Identify barriers to discharge with patient and caregiver   Arrange for needed discharge resources and transportation as appropriate

## 2023-02-15 NOTE — PROGRESS NOTES
Reassessment documented. No changes noted in care. Pt sleeping between care. Assisted to bathroom. Tolerated well. No other needs or concerns expressed. Will continue to monitor.

## 2023-02-15 NOTE — DISCHARGE SUMMARY
Hospital Medicine Discharge Summary    Patient ID: Nacho Harley      Patient's PCP: Kristine Hodges MD    Admit Date: 2/10/2023     Discharge Date: 2/15/2023    Admitting Physician: Marry Stein MD     Discharge Physician: Donita Mercer MD     Discharge Diagnoses and Hospital Course: Active Hospital Problems    Diagnosis Date Noted    Pneumonia involving right lung [J18.9] 02/11/2023     Priority: Medium    Dyspnea [R06.00] 02/10/2023     Priority: Medium    Coronary artery disease involving native heart without angina pectoris, unspecified vessel or lesion type [I25.10] 10/21/2022     Priority: Medium    Chronic renal failure, stage 3b ([facility] [address] [N18.32] 06/28/2022     Priority: Medium    PAF (paroxysmal atrial fibrillation) ([facility]) [I48.0]     HTN (hypertension), benign [I10]     COPD (chronic obstructive pulmonary disease) ([facility] [address] [J44.9] 02/14/2013    Acquired hypothyroidism [E03.9]     Mixed hyperlipidemia [E78.2] 05/16/2011    Essential hypertension, benign [I10] 05/16/2011       RUL CAP Pneumonia  - CT chest noted for right upper lobe infiltrate suggestive of pneumonia. Elevated CRP. Procalcitonin 0.15  - s/p IV Rocephin and Zithromax course  - Added Mucinex  - Continue Prednisone  - O2 per protocol  - po azithro on d/c     Mild acute COPD exacerbation. Likely 2/2 above  - on prednisone course  - med nebs  - respiratory care  - abx as per above     Diarrhea with questionable stool incontinence. Possibly abx induced? Improved  History of C diff  - repeat C. Diff testing inadequate sample  - stool studies  - clinically improved  - GI following; clear for d/c       PMH of CAD, CKD 3b, htn, hld, hypothyroidism, PAF      Exam:     The patient was seen and examined on day of discharge and this discharge summary is in conjunction with any daily progress note from day of discharge.     Consults:     IP CONSULT TO HOSPITALIST  IP CONSULT TO NEPHROLOGY  IP CONSULT TO HOME CARE NEEDS  IP CONSULT TO GI    Disposition:  home     Condition:  Stable    Discharge Instructions/Follow-up:   Pt to follow up with PCP within 1 week  Consultants as scheduled    Code Status:  Full Code    Activity: activity as tolerated    Diet: Resume home diet    Labs: For convenience and continuity at follow-up the following most recent labs are provided:      CBC:    Lab Results   Component Value Date/Time    WBC 9.5 02/15/2023 10:20 AM    HGB 12.4 02/15/2023 10:20 AM    HCT 41.0 02/15/2023 10:20 AM     02/15/2023 10:20 AM       Renal:    Lab Results   Component Value Date/Time     02/15/2023 10:20 AM    K 3.4 02/15/2023 10:20 AM    K 4.4 12/10/2022 01:01 PM     02/15/2023 10:20 AM    CO2 22 02/15/2023 10:20 AM    BUN 42 02/15/2023 10:20 AM    CREATININE 1.4 02/15/2023 10:20 AM    CALCIUM 9.1 02/15/2023 10:20 AM    PHOS 3.6 12/15/2022 02:01 PM       Discharge Medications:     Current Discharge Medication List             Details   azithromycin (ZITHROMAX) 250 MG tablet Take 1 tablet by mouth daily for 3 days  Qty: 3 tablet, Refills: 0                Details   albuterol sulfate HFA (PROVENTIL HFA) 108 (90 Base) MCG/ACT inhaler Inhale 2 puffs into the lungs every 4 hours as needed for Wheezing or Shortness of Breath (Space out to every 6 hours as symptoms improve) Space out to every 6 hours as symptoms improve.   Qty: 18 g, Refills: 0      famotidine (PEPCID) 40 MG tablet Take 1 tablet by mouth 2 times daily  Qty: 60 tablet, Refills: 0      predniSONE (DELTASONE) 10 MG tablet 1 TID for 3 day then 1 BID  Qty: 15 tablet, Refills: 0      nitroGLYCERIN (NITROLINGUAL) 0.4 MG/SPRAY 0.4 mg spray Place 1 spray under the tongue every 5 minutes as needed for Chest pain  Qty: 4.9 g, Refills: 1      torsemide (DEMADEX) 20 MG tablet 10 mg every day except Sunday and as directed for weight gain  Qty: 60 tablet, Refills: 1      clopidogrel (PLAVIX) 75 MG tablet Take 1 tablet by mouth daily  Qty: 90 tablet, Refills: 2    Comments: Patient lost her prescription that she picked up      carvedilol (COREG) 25 MG tablet Take 1 tablet by mouth 2 times daily (with meals)  Qty: 180 tablet, Refills: 3      rosuvastatin (CRESTOR) 5 MG tablet Take 1 tablet by mouth nightly  Qty: 90 tablet, Refills: 1      levothyroxine (SYNTHROID) 125 MCG tablet Take 1 tablet by mouth Daily  Qty: 90 tablet, Refills: 0      aspirin EC 81 MG EC tablet Take 1 tablet by mouth daily  Qty: 90 tablet, Refills: 1      topiramate (TOPAMAX) 50 MG tablet TAKE 2 TABLETS TWICE DAILY  Qty: 360 tablet, Refills: 0      vitamin D (ERGOCALCIFEROL) 1.25 MG (43847 UT) CAPS capsule TAKE 1 CAPSULE ONCE A WEEK  Qty: 12 capsule, Refills: 1      allopurinol (ZYLOPRIM) 100 MG tablet TAKE 1 TABLET EVERY DAY  Qty: 90 tablet, Refills: 1      fluticasone (FLONASE) 50 MCG/ACT nasal spray 2 sprays by Each Nostril route daily  Qty: 16 g, Refills: 0             Time Spent on discharge is more than 45 minutes in the examination, evaluation, counseling and review of medications and discharge plan. Signed:    Rich Moreland MD   2/15/2023    Thank you Elana Slater MD for the opportunity to be involved in this patient's care.

## 2023-02-15 NOTE — PLAN OF CARE
Problem: Discharge Planning  Goal: Discharge to home or other facility with appropriate resources  Outcome: Progressing  Flowsheets (Taken 2/15/2023 0830)  Discharge to home or other facility with appropriate resources:   Identify barriers to discharge with patient and caregiver   Arrange for needed discharge resources and transportation as appropriate     Problem: Safety - Adult  Goal: Free from fall injury  Outcome: Progressing  Flowsheets (Taken 2/15/2023 0800)  Free From Fall Injury: Instruct family/caregiver on patient safety     Problem: ABCDS Injury Assessment  Goal: Absence of physical injury  Outcome: Progressing  Flowsheets (Taken 2/15/2023 0800)  Absence of Physical Injury: Implement safety measures based on patient assessment

## 2023-02-16 ENCOUNTER — TELEPHONE (OUTPATIENT)
Dept: OTHER | Age: 83
End: 2023-02-16

## 2023-02-16 NOTE — TELEPHONE ENCOUNTER
[place] [id number]  TELEPHONE ENCOUNTER FORM    Dallin Julia 1940    Attempted to call patient for HF follow-up. No answer at this time.       Next MD/ Clinic appointment: 2/21 with [address], RN 2/16/2023 2:21 PM

## 2023-02-20 ENCOUNTER — TELEPHONE (OUTPATIENT)
Dept: FAMILY MEDICINE CLINIC | Age: 83
End: 2023-02-20

## 2023-02-20 NOTE — TELEPHONE ENCOUNTER
Patient calling to get the name and phone number of a facility that tests patient's hearing. Patient states she has misplaced the phone number and does not remember it nor the name of the facility. Also states her and WM spoke about it at the last office visit she had. I could not find anything in the chart.     Please advise

## 2023-02-21 ENCOUNTER — OFFICE VISIT (OUTPATIENT)
Dept: CARDIOLOGY CLINIC | Age: 83
End: 2023-02-21

## 2023-02-21 VITALS
HEIGHT: 63 IN | OXYGEN SATURATION: 99 % | SYSTOLIC BLOOD PRESSURE: 110 MMHG | WEIGHT: 109 LBS | HEART RATE: 62 BPM | DIASTOLIC BLOOD PRESSURE: 46 MMHG | BODY MASS INDEX: 19.31 KG/M2

## 2023-02-21 DIAGNOSIS — I48.20 CHRONIC ATRIAL FIBRILLATION (HCC): ICD-10-CM

## 2023-02-21 DIAGNOSIS — I25.83 CORONARY ARTERY DISEASE DUE TO LIPID RICH PLAQUE: ICD-10-CM

## 2023-02-21 DIAGNOSIS — N28.9 RENAL INSUFFICIENCY: ICD-10-CM

## 2023-02-21 DIAGNOSIS — I25.10 CORONARY ARTERY DISEASE DUE TO LIPID RICH PLAQUE: ICD-10-CM

## 2023-02-21 DIAGNOSIS — I50.22 CHRONIC SYSTOLIC HEART FAILURE (HCC): Primary | ICD-10-CM

## 2023-02-21 NOTE — PROGRESS NOTES
[facility] [place]  Progress Note    Primary Care Doctor:  Umesh Guidry MD    Chief Complaint   Patient presents with    Follow-Up from Hospital        History of Present Illness:  82 y.o. female with history of Ms. Menezes She has a history of atrial fibrillation (on xarelto), TIA, chronic kidney disease, CHF, hypertension, hyperlipidemia, mitral valve disease, hypothyroidism.  Her last LVEF was 45% on 7/19/19. 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  Plan is for a PCI in about 30 days  10/19-21/2022 for LHC with PCI to LM and cx, she was given 40 mg IV lasix for bnp of 14K (baseline is 3-4000) and BP extremely elevated, started on coreg.  She had complex tachycardia vs AT and has a MCOT monitor.  Watchman procedure      I had the pleasure of seeing Staci Menezes in follow up for systolic heart failure and hospitalization 2/10-15/2023 for pneumonia (antibiotics) and COPD (steroids).  She is ambulatory and with her daughter. She is feeling so much better since her hospitalization.  She still has a cough.  Blood work by home care in [place] from  2/20/23 bnp much improved to 340 (probnp [id number]).  No chest pain, palpitations, edema or lightheadedness.  No new medication changes  Phone call 2/16/2023      Past Medical History:   has a past medical history of Allergic rhinitis, cause unspecified, Arthritis, Atrial fibrillation (HCC), Bronchopneumonia, CAD (coronary artery disease), Cerebral artery occlusion with cerebral infarction (HCC), Chronic gouty arthropathy, Chronic kidney disease, Congestive heart failure, unspecified, Degeneration of cervical intervertebral disc, Essential and other specified forms of tremor, Gout, HIGH  CHOLESTEROL, History of CVA (cerebrovascular accident), Hx of blood clots, Hypertension, Influenza, Leakage of Watchman left atrial appendage closure device, Mitral valve stenosis and aortic valve insufficiency, Movement disorder, Pacemaker, Peptic ulcer, unspecified site, unspecified as acute or chronic, without mention of hemorrhage, perforation, or obstruction, Thyroid disease, Unspecified disorder of kidney and ureter, and Unspecified hypothyroidism. Surgical History:   has a past surgical history that includes back surgery; Cholecystectomy; Cardiac surgery; Coronary artery bypass graft (1987); shoulder surgery; Cardiac catheterization (7/2012); hip surgery (Left, 03/16/2017); joint replacement; Cardiac catheterization (08/07/2018); Percutaneous Transluminal Coronary Angio (11/04/2014); pr njx aa&/strd tfrml epi lumbar/sacral 1 level (Right, 12/3/2018); Femoral-femoral Bypass Graft (N/A, 8/22/2019); femoral bypass (Left, 8/22/2019); and IR KYPHOPLASTY LUMBAR 1 VERTEBRAL BODY (5/14/2021). Social History:   reports that she quit smoking about 36 years ago. Her smoking use included cigarettes. She has a 28.00 pack-year smoking history. She has never used smokeless tobacco. She reports current alcohol use. She reports that she does not use drugs. Family History:   Family History   Problem Relation Age of Onset    Cancer Sister     Heart Disease Sister     Diabetes Brother     Hypertension Brother     Heart Disease Brother     Stroke Maternal Aunt     Heart Disease Maternal Aunt     Diabetes Maternal Uncle     Hypertension Paternal Aunt     Cancer Maternal Grandmother     Cancer Paternal Grandmother     Rheum Arthritis Neg Hx     Lupus Neg Hx        Home Medications:  Prior to Admission medications    Medication Sig Start Date End Date Taking?  Authorizing Provider   albuterol sulfate HFA (PROVENTIL HFA) 108 (90 Base) MCG/ACT inhaler Inhale 2 puffs into the lungs every 4 hours as needed for Wheezing or Shortness of Breath (Space out to every 6 hours as symptoms improve) Space out to every 6 hours as symptoms improve. 2/9/23  Yes Germaine Hartmann PA-C   famotidine (PEPCID) 40 MG tablet Take 1 tablet by mouth 2 times daily 1/30/23  Yes Karena Marks MD   torsemide (DEMADEX) 20 MG tablet 10 mg every day except Sunday and as directed for weight gain 11/29/22  Yes TIFFANIE Duque - CNS   clopidogrel (PLAVIX) 75 MG tablet Take 1 tablet by mouth daily 11/25/22  Yes Kimberly Lu MD   carvedilol (COREG) 25 MG tablet Take 1 tablet by mouth 2 times daily (with meals)  Patient taking differently: Take 25 mg by mouth 2 times daily 11/7/22  Yes TIFFANIE Rodriges CNP   rosuvastatin (CRESTOR) 5 MG tablet Take 1 tablet by mouth nightly 11/7/22  Yes TIFFANIE Rodriges CNP   levothyroxine (SYNTHROID) 125 MCG tablet Take 1 tablet by mouth Daily 11/7/22  Yes TIFFANIE Rodriges CNP   aspirin EC 81 MG EC tablet Take 1 tablet by mouth daily 10/17/22  Yes Robby Bennett MD   topiramate (TOPAMAX) 50 MG tablet TAKE 2 TABLETS TWICE DAILY 10/10/22  Yes Karena Marks MD   vitamin D (ERGOCALCIFEROL) 1.25 MG (44637 UT) CAPS capsule TAKE 1 CAPSULE ONCE A WEEK 8/3/22  Yes Karena Marks MD   allopurinol (ZYLOPRIM) 100 MG tablet TAKE 1 TABLET EVERY DAY 7/13/22  Yes Karena Marks MD   fluticasone (FLONASE) 50 MCG/ACT nasal spray 2 sprays by Each Nostril route daily 1/25/22  Yes TIFFANIE Rangel CNP   nitroGLYCERIN (NITROLINGUAL) 0.4 MG/SPRAY 0.4 mg spray Place 1 spray under the tongue every 5 minutes as needed for Chest pain  Patient not taking: Reported on 2/21/2023 1/17/23   TIFFANIE Edge - CNS        Allergies:  Aspirin, Diltiazem, Diltiazem hcl, Lorazepam, Sulfa antibiotics, Atorvastatin, Dabigatran, Mysoline [primidone], Nsaids, Other, and Primidone     Review of Systems:   Constitutional: there has been no unanticipated weight loss.  There's been no change in energy level, sleep pattern, or activity level. Eyes: No visual changes or diplopia. No scleral icterus. ENT: No Headaches, hearing loss or vertigo. No mouth sores or sore throat. Cardiovascular: Reviewed in HPI  Respiratory: No cough or wheezing, no sputum production. No hematemesis. Gastrointestinal: No abdominal pain, appetite loss, blood in stools. No change in bowel or bladder habits. States she feels bloated   Genitourinary: No dysuria, trouble voiding, or hematuria. Musculoskeletal:  No gait disturbance, weakness or joint complaints. Integumentary: No rash or pruritis. Neurological: No headache, diplopia, change in muscle strength, numbness or tingling. No change in gait, balance, coordination, mood, affect, memory, mentation, behavior. Psychiatric: No anxiety, no depression. Endocrine: No malaise, fatigue or temperature intolerance. No excessive thirst, fluid intake, or urination. No tremor. Hematologic/Lymphatic: No abnormal bruising or bleeding, blood clots or swollen lymph nodes. Allergic/Immunologic: No nasal congestion or hives. Physical Examination:    Vitals:    02/21/23 1308   BP: (!) 110/46   Pulse: 62   SpO2: 99%   Weight: 109 lb (49.4 kg)   Height: 5' 3\" (1.6 m)              Constitutional and General Appearance: Warm and dry, no apparent distress, normal coloration  HEENT:  Normocephalic, atraumatic  Respiratory:  Normal excursion and expansion without use of accessory muscles  Resp Auscultation: clear bilaterally, no rales, rhonchi or wheezing  Cardiovascular: The apical impulses not displaced  Heart tones are crisp and normal  No JVP  Regular rhythm   Peripheral pulses are symmetrical and full  There is no clubbing, cyanosis of the extremities.   No ankle edema  Pedal Pulses: 2+ and equal   Abdomen:  No masses or tenderness  Liver/Spleen: No Abnormalities Noted  Neurological/Psychiatric:  Alert and oriented in all spheres  Moves all extremities well  Exhibits normal gait balance and coordination  No abnormalities of mood, affect, memory, mentation, or behavior are noted    Lab Data:    CBC:   Lab Results   Component Value Date/Time    WBC 9.5 02/15/2023 10:20 AM    WBC 6.8 02/14/2023 04:50 AM    WBC 7.2 02/13/2023 04:44 AM    RBC 5.33 02/15/2023 10:20 AM    RBC 5.08 02/14/2023 04:50 AM    RBC 5.11 02/13/2023 04:44 AM    HGB 12.4 02/15/2023 10:20 AM    HGB 11.6 02/14/2023 04:50 AM    HGB 11.7 02/13/2023 04:44 AM    HCT 41.0 02/15/2023 10:20 AM    HCT 38.5 02/14/2023 04:50 AM    HCT 39.2 02/14/2023 04:50 AM    MCV 76.9 02/15/2023 10:20 AM    MCV 75.8 02/14/2023 04:50 AM    MCV 75.9 02/13/2023 04:44 AM    RDW 19.8 02/15/2023 10:20 AM    RDW 19.5 02/14/2023 04:50 AM    RDW 20.2 02/13/2023 04:44 AM     02/15/2023 10:20 AM     02/14/2023 04:50 AM     02/13/2023 04:44 AM     BMP:  Lab Results   Component Value Date/Time     02/15/2023 10:20 AM     02/14/2023 04:50 AM     02/13/2023 04:44 AM    K 3.4 02/15/2023 10:20 AM    K 3.7 02/14/2023 04:50 AM    K 3.2 02/13/2023 04:44 AM    K 4.4 12/10/2022 01:01 PM    K 3.5 11/12/2022 04:33 AM    K 4.3 11/11/2022 06:55 PM     02/15/2023 10:20 AM     02/14/2023 04:50 AM     02/13/2023 04:44 AM    CO2 22 02/15/2023 10:20 AM    CO2 22 02/14/2023 04:50 AM    CO2 22 02/13/2023 04:44 AM    PHOS 3.6 12/15/2022 02:01 PM    PHOS 2.8 11/15/2022 05:19 AM    PHOS 3.2 11/14/2022 05:39 AM    BUN 42 02/15/2023 10:20 AM    BUN 46 02/14/2023 04:50 AM    BUN 60 02/13/2023 04:44 AM    CREATININE 1.4 02/15/2023 10:20 AM    CREATININE 1.5 02/14/2023 04:50 AM    CREATININE 1.7 02/13/2023 04:44 AM     BNP:   Lab Results   Component Value Date/Time    PROBNP 13,600 02/10/2023 04:30 PM    PROBNP 28,003 02/08/2023 10:37 PM    PROBNP 6,148 12/15/2022 02:01 PM     Cardiac Imaging:  Echo 1/12/2023  Summary  Normal left ventricle size, wall thickness, and systolic function with an estimated ejection fraction of 55-60%.   Left atrial size is dilated. There is a Watchman device seated with a feliciano-device leak measured at 1 mm on the anterior side. No thrombus seen. Moderate tricuspid regurgitation. Echo 11/15/2022  Summary  Left ventricle is dilated with mild concentric left ventricular hypertrophy. Overall, left ventricular systolic function is mildly depressed. Ejection fraction is visually estimated to be 45-50%. No obvious regional wall motion abnormalities are noted. Grade III diastolic dysfunction with elevated LV filling pressures. E/e' = 31.0. There is at least moderate mitral regurgitation. Moderate tricuspid regurgitation. Estimated pulmonary artery systolic pressure is elevated at 44 mmHg assuming a right atrial  pressure of 15 mmHg. Mild pulmonary hypertension. Biatrial enlargement. The aortic valve appears sclerotic but opens well. Mild aortic regurgitation. [facility]: 10/19/22 Dr Macario Mack  Findings  Artery Findings/Result   LM Ulcerated, 80% to mid, eccentric   LAD NA   Cx 80% prox   RI N/A   RCA N/A   LVEDP     LVG        Intervention(s)  Artery Location Intervention Result   LM Os-prox Xience 3.5X12 (PD with 4. 0NC to 18atm) No residual   Cx prox Xience 3.5X18  No residual      Post Cath Dx:       CAD as above      Cardiac CTA: 9/15/22  FINDINGS:   Left atrium:       Watch man device is seen. There is only a small amount of thrombus in the   watch man device. .       The left atrial appendage is not thrombosed. Contrast is seen in the left   atrial appendage, compatible with leakage. .       On axial images 18. There is a crescentic area of contrast flowing around   the watch man device superiorly in the left atrial appendage. Rodgers Potters However, no   definite fistulous tract is seen connecting this to the left atrium. Circumflex coronary artery abuts the inferior aspect of the watch man device. There is severe calcified and noncalcified plaque seen in the circumflex.   A   definite fistulous tract between the circumflex and the left atrial appendage   is not clearly identified by CT. Isa Campbell Native right coronary artery and left anterior descending coronary artery   heavily calcified. Mediastinum: Heart is enlarged. Thyroid gland unremarkable. Streak artifact   is seen from pacer. Ascending aorta measures 3.5 cm. No intimal flap is seen. No pericardial   effusion. Small hiatal hernia seen. No central pulmonary embolus   identified. Small mediastinal and hilar nodes are noted. Right hilar [place]   conglomerate, measures 1.9 cm x 2.4 cm. .  Calcification or clip seen in the   region of mitral valve. Lungs/Pleura: Respiratory motion artifact limits evaluation of fine pulmonary   parenchymal change. Mild underlying emphysema is seen. De pendant opacity   is seen at the lung bases, likely atelectasis. Pleural calcifications seen   on the left. Right hilar nodes are seen, similar compared to conventional chest CT August 2020       Upper Abdomen: Low attenuation is seen liver, compatible with fatty   infiltration. Soft Tissues/Bones: No acute bone or soft tissue abnormality. Cath: 9/12/22 Dr Jm Barry  Findings  Artery Findings/Result   LM 30% ostial to proximal   LAD Patent prox to mid stent, 60% diffuse ostial to mid   Cx 80% prox, fistula noted from Cx proper v Cx branch to left atrium/left atrial appendage (not present on prior angiogram done before placement of Watchman device, suspect erosion), OM1 70% bifurcational, inferior branch of OM1 50% mid,    RI N/A   RCA Mid 100% with R-R collaterals.    L-LAD patent   LVEDP NA   LVG NA      RHC:  RA RV PA FANNIE WP TCO TCI [facility] RA% PA%   2 30/3 35/15 23 JULIOCESAR 4.5 3.0 4.7 3.1 70 67%      Intervention(s)  None     Post Cath Dx:       CAD as above  Consider PCI of Cx in future if Cx proper geographically  from [facility] device on CTC    DONA Dr Aurora Rothman 7/20/2022  Summary   Normal left ventricle size, wall thickness, and systolic function with an   estimated ejection fraction of 55-60%. Moderate mitral regurgitation is present. Left atrial size is dilated. There is a Watchman device seated with a feliciano-device leak measured at 3 mm   on the anterior side. No thrombus seen. Moderate tricuspid regurgitation. Systolic pulmonary artery pressure (SPAP) estimated at 41 mmHg (RA pressure   3 mmHg), consistent with mild pulmonary hypertension. Echo 1/12/2022  Summary   Normal left ventricle size, wall thickness, and systolic function with an   estimated ejection fraction of 55-60%. Mitral valve leaflets appear moderately thickened. The posterior leaflet   appears severely restricted. Moderate mitral regurgitation is present. Mitral annular calcification. Left atrial size is dilated. There is a Watchman device seated with a   feliciano-device leak measured at 2 mm on the anterior side. There is a small an echogenic mass on the surface of the device, consistent   with thrombus near the coumadin ridge. DONA 7/7/2021  Summary   Ejection fraction is visually estimated to be 45-50 %. Mild thickening of anterior, posterior leaflet of mitral valve. There is decreased leaflet motion and \"hockey stick\" appearance of the   mitral leaflets. posterior leaflet is immobile. Mean Gradient 4 mmHg. Moderate mitral regurgitation is present. There is no evidence of mass or thrombus in the left atrium or appendage. Watchman in place. No thrombus. <3 mm leak. The left atrium is dilated. A PFO is present. Aortic valve leaflets appear thickened. Tricuspid aortic valve. Mild aortic regurgitation is present. Plaque is noted in the thoracic aorta. Moderate tricuspid regurgitation.  PASP 51 mmHg    [facility] 3/19/2021 Dr Mira Castle  Artery Findings/Result   LM Normal   LAD 50% ostial, 50% mid instent   Cx 50% mid at OM1 bifurcation, 30% OM1 mid at bifurcation   RI NA   % prox with multiple R-R collaterals   LVEDP 8   LVG NA due to renal failure Intervention:         None     Post Cath Dx:       Stable CAD  Possible angina from  of RCA - poor candidate for  intervention with renal failure    Echo 4/30/2020   Left ventricular cavity size is normal. Normal left ventricular wall   thickness. Left ventricular function appears to be reduced with an estimated   ejection fraction of 45%. Diffuse hypokinesis. Echo 7/19/2019   Summary    Left ventricle - normal size, thickness, reduced function with EF of 45%  LV wall motion - diffuse hypokinesis. Mitral valve - thickened, annular calcification, moderate regurgitation  Aortic valve - thickened, calcified, mild regurgitation  Tricuspid valve - mild regurgitation with PASP of 25mmHg  Pulmonic valve - trivial regurgitation   Biatrial enlargement  Pacemaker / ICD lead is visualized in the right heart. 6/2019 Dr Salty Mendez  NSTEMI: . Angiogram findings were as below:      Findings:                      LM       Normal              LAD     50% ostial, mid 100%              Cx        40% OM1 at bifurcation              RCA     Mid 100%              LVG     Not performed              EDP     15 mmHg              L-LAD  Patent              S-PDA Known occluded  Severe Ca++:None  Post Cath Dx:Stable CAD, no apparent culprit for NSTEMI  Intervention:  None  Med Rec:        Continue aggressive med tx                          Continue plavix, no asa due to allergy. statin added. LDL 75   8/7/18  The PCI of complex proximal RCA chronic total occlusion was unsuccessful (J- score 3). Underwent successful Angioplasty of high-grade proximal RCA lesion proximal to the . RECOMMENDATIONS:  Attempt on this complex long  was unsuccessful. Lack   Of retrograde collaterals along with a very ambiguous cap as well as a large RV marginal branch and bridging collateral coming off at the cap makes it a  challenging repeat attempt.  If she continues to have angina, it is recommended to proceed with the single-vessel SVG to the RCA. Echo: 2/17/16 ([facility])  Conclusions: Normal LV size and systolic function Mild to moderate mitral  regurgitation Mild tricuspid regurgitation  Findings:   Left Atrium: Mild to moderate enlargement of the left atrium. Left Ventricle: Upper limits of normal left ventricle size. No left ventricular  hypertrophy. Normal left ventricular systolic function. The ejection fraction   Is visually estimated to be 55 %. Right Atrium: Normal right atrial size. RightVentricle: Normal right ventricle size. Normal right ventricular function. Aortic Valve: Tri-leaflet aortic valve. Mild aortic cusp calcification. No  aortic valve stenosis. Mild (1+) aortic valve regurgitation. Aorta: No dilatation of the aortic root. IVC/PA: Normal IVC size and normal respiratory  collapse consistent with normal right atrial pressure. Mitral Valve: Mild mitral valve leaflet calcification. Mild to moderate (2+) mitral valve  regurgitation. No mitral valve stenosis. Tricuspid Valve: Mild (1+) tricuspid  regurgitation. The pulmonary artery pressure is normal.   Pulmonic Valve: Mild  pulmonic regurgitation. Pericardium: Normal pericardium with no significant  pericardial effusion. Assessment:    1. Chronic systolic heart failure (HCC) with improved LVEF on bb; no aldosterone antagonist due to renal insuff   2. Chronic atrial fibrillation s/p watchman Dr Constance López   3. Coronary artery disease involving autologous artery coronary bypass graft without angina pectoris    4.       Renal insufficiency follows with Dr Tereso Hi:   One more week with home care   Ask home care to send blood work to [facility] all current medications  [facility]O in 3 months at [facility]    I appreciate the opportunity of cooperating in the care of this individual.    TIFFANIE Jauregui - CNS, CNS, 2/21/2023, 2:11 PM

## 2023-02-21 NOTE — PATIENT INSTRUCTIONS
One more week with home care   Ask home care to send blood work to [employer] all current medications  RTO in 3 months at [employer]

## 2023-02-22 RX ORDER — ERGOCALCIFEROL 1.25 MG/1
CAPSULE ORAL
Qty: 12 CAPSULE | Refills: 0 | Status: SHIPPED | OUTPATIENT
Start: 2023-02-22

## 2023-02-22 NOTE — TELEPHONE ENCOUNTER
Medication:   Requested Prescriptions     Pending Prescriptions Disp Refills    vitamin D (ERGOCALCIFEROL) 1.25 MG (56873 UT) CAPS capsule [Pharmacy Med Name: VITAMIN D 1.25 MG (62773 UT) Capsule] 12 capsule 1     Sig: TAKE 1 CAPSULE ONE TIME WEEKLY      Last Filled:      Patient Phone Number: [phone] (home) [phone] (work)    Last appt: 1/30/2023   Next appt: 2/27/2023    Last OARRS:   RX Monitoring 6/1/2021   Attestation -   Periodic Controlled Substance Monitoring Possible medication side effects, risk of tolerance/dependence & alternative treatments discussed.      PDMP Monitoring:    Last PDMP Sunday Demetri as Reviewed [facility]):  Review User Review Instant Review Result   Umesh HANNA 4/21/2022  4:20 PM Reviewed PDMP [1]     Preferred Pharmacy:   [facility] [id number] - [id number] [phone] Sandra Hummel [phone]  [address] [facility] [address]  Phone: [phone] Fax: [phone]    [facility] - [place] [phone] - F [phone]  [address] [place] [id number]  Phone: [phone] Fax: [phone]

## 2023-02-27 ENCOUNTER — OFFICE VISIT (OUTPATIENT)
Dept: FAMILY MEDICINE CLINIC | Age: 83
End: 2023-02-27
Payer: MEDICARE

## 2023-02-27 VITALS
SYSTOLIC BLOOD PRESSURE: 120 MMHG | HEART RATE: 74 BPM | RESPIRATION RATE: 12 BRPM | TEMPERATURE: 97.9 F | DIASTOLIC BLOOD PRESSURE: 64 MMHG | BODY MASS INDEX: 19.75 KG/M2 | WEIGHT: 111.5 LBS

## 2023-02-27 DIAGNOSIS — I50.42 CHRONIC COMBINED SYSTOLIC (CONGESTIVE) AND DIASTOLIC (CONGESTIVE) HEART FAILURE (HCC): ICD-10-CM

## 2023-02-27 DIAGNOSIS — D50.9 IRON DEFICIENCY ANEMIA, UNSPECIFIED IRON DEFICIENCY ANEMIA TYPE: ICD-10-CM

## 2023-02-27 DIAGNOSIS — J18.9 PNEUMONIA OF RIGHT UPPER LOBE DUE TO INFECTIOUS ORGANISM: Primary | ICD-10-CM

## 2023-02-27 DIAGNOSIS — E44.0 MODERATE PROTEIN-CALORIE MALNUTRITION (HCC): ICD-10-CM

## 2023-02-27 DIAGNOSIS — N18.32 CHRONIC RENAL FAILURE, STAGE 3B (HCC): ICD-10-CM

## 2023-02-27 PROCEDURE — 3074F SYST BP LT 130 MM HG: CPT | Performed by: FAMILY MEDICINE

## 2023-02-27 PROCEDURE — 1036F TOBACCO NON-USER: CPT | Performed by: FAMILY MEDICINE

## 2023-02-27 PROCEDURE — 1090F PRES/ABSN URINE INCON ASSESS: CPT | Performed by: FAMILY MEDICINE

## 2023-02-27 PROCEDURE — 3078F DIAST BP <80 MM HG: CPT | Performed by: FAMILY MEDICINE

## 2023-02-27 PROCEDURE — 90732 PPSV23 VACC 2 YRS+ SUBQ/IM: CPT | Performed by: FAMILY MEDICINE

## 2023-02-27 PROCEDURE — G8399 PT W/DXA RESULTS DOCUMENT: HCPCS | Performed by: FAMILY MEDICINE

## 2023-02-27 PROCEDURE — 99214 OFFICE O/P EST MOD 30 MIN: CPT | Performed by: FAMILY MEDICINE

## 2023-02-27 PROCEDURE — G0009 ADMIN PNEUMOCOCCAL VACCINE: HCPCS | Performed by: FAMILY MEDICINE

## 2023-02-27 PROCEDURE — 1111F DSCHRG MED/CURRENT MED MERGE: CPT | Performed by: FAMILY MEDICINE

## 2023-02-27 PROCEDURE — G8420 CALC BMI NORM PARAMETERS: HCPCS | Performed by: FAMILY MEDICINE

## 2023-02-27 PROCEDURE — 1123F ACP DISCUSS/DSCN MKR DOCD: CPT | Performed by: FAMILY MEDICINE

## 2023-02-27 PROCEDURE — G8427 DOCREV CUR MEDS BY ELIG CLIN: HCPCS | Performed by: FAMILY MEDICINE

## 2023-02-27 PROCEDURE — G8484 FLU IMMUNIZE NO ADMIN: HCPCS | Performed by: FAMILY MEDICINE

## 2023-02-27 NOTE — PROGRESS NOTES
Immunization(s) given during visit:     Immunizations Administered       Name Date Dose Route    Pneumococcal Polysaccharide ([id number]) 2/27/2023 0.5 mL Intramuscular    Site: Deltoid- Left    Lot: [id number]    NDC: 7894-1379-77             Patient instructed to remain in clinic for 20 minutes after injection and was advised to report any adverse reaction to me immediately.

## 2023-02-27 NOTE — PROGRESS NOTES
Subjective:      Patient ID: Eric Sexton is a 80 y.o. female. CC: Patient presents for hospital follow-up. HPI pt is here with her daughter for a hospital follow up. Pt was seen at [facility] due to having a cough, felt short of breath, and was dx with pneumonia. Patient was admitted to [facility] February 10 through February 15. She was diagnosed with a right upper lobe pneumonia and they were concerned about possible COPD. Patient does not have a history of recurrent respiratory issues in the past.  She feels much better at this point in time.   Patient was evaluated by kidney specialist while she was in the hospital as well and kidney function appears to be stable with a creatinine of 1.4 although it was 1.8 when she was first admitted to the hospital.    Review of Systems  Patient Active Problem List   Diagnosis    Essential hypertension, benign    Mixed hyperlipidemia    Chronic gouty arthropathy    Acquired hypothyroidism    Non-rheumatic mitral regurgitation    COPD (chronic obstructive pulmonary disease) (HCC)    Restrictive lung disease    Sick sinus syndrome (HCC)    Allergic rhinitis    Fibrocystic breast    Cerebrovascular accident (CVA) ([address]    HTN (hypertension), benign    Pacemaker    PAT (paroxysmal atrial tachycardia) ([facility])    Primary osteoarthritis involving multiple joints    Vitamin D deficiency    Tremor    Chronic total occlusion of native coronary artery    Age related osteoporosis    Chronic combined systolic (congestive) and diastolic (congestive) heart failure (HCC)    Stage 3 chronic kidney disease ([address]    PAD (peripheral artery disease) ([address]    Atherosclerosis of native arteries of extremities with intermittent claudication, bilateral legs (HCC)    Idiopathic peripheral neuropathy    Ischemic cardiomyopathy    Peripheral vertigo, bilateral    PAF (paroxysmal atrial fibrillation) (HCC)    Dyslipidemia    Iron deficiency anemia    Bilateral sensorineural hearing loss Memory loss due to medical condition    Symptomatic bradycardia    Pacemaker lead failure    Presence of Watchman left atrial appendage closure device    Chronic right sacroiliac pain    Postcholecystectomy diarrhea    Chronic renal failure, stage 3b (HCC)    Coronary artery disease involving native heart without angina pectoris, unspecified vessel or lesion type    Hoarseness, persistent    Protein calorie malnutrition    Dyspnea    Pneumonia involving right lung       Outpatient Medications Marked as Taking for the 2/27/23 encounter (Office Visit) with Fortunato Coto MD   Medication Sig Dispense Refill    vitamin D (ERGOCALCIFEROL) 1.25 MG (35417 UT) CAPS capsule TAKE 1 CAPSULE ONE TIME WEEKLY 12 capsule 0    albuterol sulfate HFA (PROVENTIL HFA) 108 (90 Base) MCG/ACT inhaler Inhale 2 puffs into the lungs every 4 hours as needed for Wheezing or Shortness of Breath (Space out to every 6 hours as symptoms improve) Space out to every 6 hours as symptoms improve.  18 g 0    famotidine (PEPCID) 40 MG tablet Take 1 tablet by mouth 2 times daily 60 tablet 0    nitroGLYCERIN (NITROLINGUAL) 0.4 MG/SPRAY 0.4 mg spray Place 1 spray under the tongue every 5 minutes as needed for Chest pain 4.9 g 1    torsemide (DEMADEX) 20 MG tablet 10 mg every day except Sunday and as directed for weight gain 60 tablet 1    clopidogrel (PLAVIX) 75 MG tablet Take 1 tablet by mouth daily 90 tablet 2    carvedilol (COREG) 25 MG tablet Take 1 tablet by mouth 2 times daily (with meals) (Patient taking differently: Take 25 mg by mouth 2 times daily) 180 tablet 3    rosuvastatin (CRESTOR) 5 MG tablet Take 1 tablet by mouth nightly 90 tablet 1    levothyroxine (SYNTHROID) 125 MCG tablet Take 1 tablet by mouth Daily 90 tablet 0    aspirin EC 81 MG EC tablet Take 1 tablet by mouth daily 90 tablet 1    topiramate (TOPAMAX) 50 MG tablet TAKE 2 TABLETS TWICE DAILY 360 tablet 0    allopurinol (ZYLOPRIM) 100 MG tablet TAKE 1 TABLET EVERY DAY 90 tablet 1 fluticasone (FLONASE) 50 MCG/ACT nasal spray 2 sprays by Each Nostril route daily 16 g 0       Allergies   Allergen Reactions    Aspirin Nausea Only    Diltiazem Anaphylaxis    Diltiazem Hcl Anaphylaxis    Lorazepam Other (See Comments)     hallucinations  hallucinations  hallucinations    Sulfa Antibiotics Rash and Hives    Atorvastatin Other (See Comments)     Muscle pains  Muscle pains  Muscle pains    Dabigatran Nausea Only     And indigestion  And indigestion    Aka Pradaxa  And indigestion  And indigestion  And indigestion    Aka Pradaxa  And indigestion  And indigestion  And indigestion    Aka Pradaxa    Mysoline [Primidone]     Nsaids      Other reaction(s): Unknown    Other Other (See Comments)     Nitroglycerin patches causes severe headaches    Primidone      Other reaction(s): Unknown       Social History     Tobacco Use    Smoking status: Former     Packs/day: 1.00     Years: 28.00     Pack years: 28.00     Types: Cigarettes     Quit date: 1987     Years since quittin.1    Smokeless tobacco: Never    Tobacco comments:     H.O.smoking at age 15 / smoked up to 1 p.p.d / quit    Substance Use Topics    Alcohol use: Yes     Comment: social       /64 (Site: Right Upper Arm, Position: Sitting, Cuff Size: Large Adult)   Pulse 74   Temp 97.9 °F (36.6 °C) (Infrared)   Resp 12   Wt 111 lb 8 oz (50.6 kg)   BMI 19.75 kg/m²      Objective:   Physical Exam  Constitutional:       General: She is not in acute distress. Appearance: She is well-developed. Neck:      Vascular: No carotid bruit. Cardiovascular:      Rate and Rhythm: Normal rate and regular rhythm. Pulses:           Dorsalis pedis pulses are 2+ on the right side and 2+ on the left side. Posterior tibial pulses are 2+ on the right side and 2+ on the left side. Heart sounds: Murmur heard. Systolic (right sternal border) murmur is present. No friction rub. No gallop.    Pulmonary:      Effort: Pulmonary effort is normal.      Breath sounds: Normal breath sounds. Musculoskeletal:         General: No tenderness. Normal range of motion. Right lower leg: No edema. Left lower leg: No edema. Neurological:      Mental Status: She is alert and oriented to person, place, and time. Sensory: Sensation is intact. Motor: Motor function is intact. Psychiatric:         Behavior: Behavior is cooperative. Assessment:      Darwin Schmitz was seen today for follow-up from hospital.    Diagnoses and all orders for this visit:    Pneumonia of right upper lobe due to infectious organism    Moderate protein-calorie malnutrition (HCC)    Chronic combined systolic (congestive) and diastolic (congestive) heart failure (HCC)    Iron deficiency anemia, unspecified iron deficiency anemia type    Chronic renal failure, stage 3b (HCC)    Other orders  -     Pneumococcal, PPSV23, PNEUMOVAX 23, (age 2 yrs+), SC/IM          Plan:      Hospital information reviewed with patient and daughter    Clinically she is significantly improved after completing antibiotic therapy and pneumonia vaccine was updated    Her weight is down 4 pounds from prior to hospitalization but her appetite now is returned and encourage patient eat on a healthy diet. Iron deficiency anemia was checked in the hospital her iron levels are back to normal range    Chronic renal failure stable. Doubtful patient has COPD    RTC at normal appointment time in April        Please note that this chart was generated using Dragon dictation software. Although every effort was made to ensure the accuracy of this automated transcription, some errors in transcription may have occurred.

## 2023-02-28 DIAGNOSIS — N17.9 ACUTE RENAL FAILURE, UNSPECIFIED ACUTE RENAL FAILURE TYPE (HCC): ICD-10-CM

## 2023-02-28 DIAGNOSIS — I50.22 CHRONIC SYSTOLIC HEART FAILURE (HCC): ICD-10-CM

## 2023-02-28 LAB
BACTERIA: NORMAL /HPF
EPITHELIAL CELLS, UA: 3 /HPF (ref 0–5)
HCT VFR BLD CALC: 41.1 % (ref 36–48)
HEMATOLOGY PATH CONSULT: NO
HEMOGLOBIN: 11.8 G/DL (ref 12–16)
HYALINE CASTS: 0 /LPF (ref 0–8)
MCH RBC QN AUTO: 23 PG (ref 26–34)
MCHC RBC AUTO-ENTMCNC: 28.7 G/DL (ref 31–36)
MCV RBC AUTO: 80 FL (ref 80–100)
PARATHYROID HORMONE INTACT: 102.1 PG/ML (ref 14–72)
PDW BLD-RTO: 21.8 % (ref 12.4–15.4)
PLATELET # BLD: 247 K/UL (ref 135–450)
PLATELET SLIDE REVIEW: ADEQUATE
PMV BLD AUTO: 11.1 FL (ref 5–10.5)
RBC # BLD: 5.13 M/UL (ref 4–5.2)
RBC UA: 0 /HPF (ref 0–4)
SLIDE REVIEW: ABNORMAL
URINE TYPE: NORMAL
WBC # BLD: 5 K/UL (ref 4–11)
WBC UA: 3 /HPF (ref 0–5)

## 2023-02-28 RX ORDER — LEVOTHYROXINE SODIUM 0.12 MG/1
TABLET ORAL
Qty: 90 TABLET | Refills: 0 | Status: SHIPPED | OUTPATIENT
Start: 2023-02-28

## 2023-03-01 ENCOUNTER — TELEPHONE (OUTPATIENT)
Dept: CARDIOLOGY CLINIC | Age: 83
End: 2023-03-01

## 2023-03-01 LAB
ALBUMIN SERPL-MCNC: 3.6 G/DL (ref 3.4–5)
ANION GAP SERPL CALCULATED.3IONS-SCNC: 10 MMOL/L (ref 3–16)
BUN BLDV-MCNC: 27 MG/DL (ref 7–20)
CALCIUM SERPL-MCNC: 8.6 MG/DL (ref 8.3–10.6)
CHLORIDE BLD-SCNC: 114 MMOL/L (ref 99–110)
CO2: 21 MMOL/L (ref 21–32)
CREAT SERPL-MCNC: 1.4 MG/DL (ref 0.6–1.2)
GFR SERPL CREATININE-BSD FRML MDRD: 37 ML/MIN/{1.73_M2}
GLUCOSE BLD-MCNC: 81 MG/DL (ref 70–99)
PHOSPHORUS: 3.5 MG/DL (ref 2.5–4.9)
POTASSIUM SERPL-SCNC: 4.9 MMOL/L (ref 3.5–5.1)
PRO-BNP: 5492 PG/ML (ref 0–449)
SODIUM BLD-SCNC: 145 MMOL/L (ref 136–145)

## 2023-03-01 NOTE — TELEPHONE ENCOUNTER
----- Message from TIFFANIE Chatterjee sent at 3/1/2023  8:33 AM EST -----  Her fluid level is much improved  All looks great  Keep smiling

## 2023-03-13 ENCOUNTER — TELEPHONE (OUTPATIENT)
Dept: FAMILY MEDICINE CLINIC | Age: 83
End: 2023-03-13

## 2023-03-13 ENCOUNTER — APPOINTMENT (OUTPATIENT)
Dept: CT IMAGING | Age: 83
End: 2023-03-13
Payer: MEDICARE

## 2023-03-13 ENCOUNTER — HOSPITAL ENCOUNTER (EMERGENCY)
Age: 83
Discharge: HOME | End: 2023-03-13
Attending: EMERGENCY MEDICINE
Payer: MEDICARE

## 2023-03-13 VITALS
HEART RATE: 71 BPM | WEIGHT: 111 LBS | TEMPERATURE: 97.1 F | BODY MASS INDEX: 19.67 KG/M2 | HEIGHT: 63 IN | DIASTOLIC BLOOD PRESSURE: 64 MMHG | OXYGEN SATURATION: 98 % | RESPIRATION RATE: 18 BRPM | SYSTOLIC BLOOD PRESSURE: 114 MMHG

## 2023-03-13 DIAGNOSIS — R10.9 LEFT FLANK PAIN: Primary | ICD-10-CM

## 2023-03-13 LAB
A/G RATIO: 1.3 (ref 1.1–2.2)
ALBUMIN SERPL-MCNC: 3.5 G/DL (ref 3.4–5)
ALP BLD-CCNC: 85 U/L (ref 40–129)
ALT SERPL-CCNC: 7 U/L (ref 10–40)
ANION GAP SERPL CALCULATED.3IONS-SCNC: 9 MMOL/L (ref 3–16)
AST SERPL-CCNC: 10 U/L (ref 15–37)
BACTERIA: NORMAL /HPF
BASOPHILS ABSOLUTE: 0.1 K/UL (ref 0–0.2)
BASOPHILS RELATIVE PERCENT: 2.1 %
BILIRUB SERPL-MCNC: 0.4 MG/DL (ref 0–1)
BILIRUBIN URINE: NEGATIVE
BLOOD, URINE: NEGATIVE
BUN BLDV-MCNC: 25 MG/DL (ref 7–20)
CALCIUM SERPL-MCNC: 8.8 MG/DL (ref 8.3–10.6)
CHLORIDE BLD-SCNC: 114 MMOL/L (ref 99–110)
CLARITY: CLEAR
CO2: 22 MMOL/L (ref 21–32)
COLOR: YELLOW
CREAT SERPL-MCNC: 1.5 MG/DL (ref 0.6–1.2)
EOSINOPHILS ABSOLUTE: 0.2 K/UL (ref 0–0.6)
EOSINOPHILS RELATIVE PERCENT: 2.7 %
EPITHELIAL CELLS, UA: 3 /HPF (ref 0–5)
GFR SERPL CREATININE-BSD FRML MDRD: 34 ML/MIN/{1.73_M2}
GLUCOSE BLD-MCNC: 110 MG/DL (ref 70–99)
GLUCOSE URINE: NEGATIVE MG/DL
HCT VFR BLD CALC: 37.7 % (ref 36–48)
HEMOGLOBIN: 11.3 G/DL (ref 12–16)
HYALINE CASTS: 2 /LPF (ref 0–8)
KETONES, URINE: NEGATIVE MG/DL
LEUKOCYTE ESTERASE, URINE: ABNORMAL
LIPASE: 15 U/L (ref 13–60)
LYMPHOCYTES ABSOLUTE: 0.9 K/UL (ref 1–5.1)
LYMPHOCYTES RELATIVE PERCENT: 15.4 %
MCH RBC QN AUTO: 23.6 PG (ref 26–34)
MCHC RBC AUTO-ENTMCNC: 30.1 G/DL (ref 31–36)
MCV RBC AUTO: 78.4 FL (ref 80–100)
MICROSCOPIC EXAMINATION: YES
MONOCYTES ABSOLUTE: 0.3 K/UL (ref 0–1.3)
MONOCYTES RELATIVE PERCENT: 5.8 %
NEUTROPHILS ABSOLUTE: 4.3 K/UL (ref 1.7–7.7)
NEUTROPHILS RELATIVE PERCENT: 74 %
NITRITE, URINE: NEGATIVE
PDW BLD-RTO: 24.1 % (ref 12.4–15.4)
PH UA: 5 (ref 5–8)
PLATELET # BLD: 256 K/UL (ref 135–450)
PMV BLD AUTO: 9.6 FL (ref 5–10.5)
POTASSIUM REFLEX MAGNESIUM: 3.7 MMOL/L (ref 3.5–5.1)
PROTEIN UA: NEGATIVE MG/DL
RBC # BLD: 4.8 M/UL (ref 4–5.2)
RBC UA: 0 /HPF (ref 0–4)
SODIUM BLD-SCNC: 145 MMOL/L (ref 136–145)
SPECIFIC GRAVITY UA: 1.01 (ref 1–1.03)
TOTAL PROTEIN: 6.1 G/DL (ref 6.4–8.2)
URINE REFLEX TO CULTURE: ABNORMAL
URINE TYPE: ABNORMAL
UROBILINOGEN, URINE: 0.2 E.U./DL
WBC # BLD: 5.9 K/UL (ref 4–11)
WBC UA: 2 /HPF (ref 0–5)

## 2023-03-13 PROCEDURE — 74176 CT ABD & PELVIS W/O CONTRAST: CPT

## 2023-03-13 PROCEDURE — 99284 EMERGENCY DEPT VISIT MOD MDM: CPT

## 2023-03-13 PROCEDURE — 36415 COLL VENOUS BLD VENIPUNCTURE: CPT

## 2023-03-13 PROCEDURE — 85025 COMPLETE CBC W/AUTO DIFF WBC: CPT

## 2023-03-13 PROCEDURE — 80053 COMPREHEN METABOLIC PANEL: CPT

## 2023-03-13 PROCEDURE — 83690 ASSAY OF LIPASE: CPT

## 2023-03-13 PROCEDURE — 81001 URINALYSIS AUTO W/SCOPE: CPT

## 2023-03-13 RX ORDER — DICYCLOMINE HYDROCHLORIDE 10 MG/1
10 CAPSULE ORAL 3 TIMES DAILY PRN
Qty: 20 CAPSULE | Refills: 1 | Status: SHIPPED | OUTPATIENT
Start: 2023-03-13

## 2023-03-13 RX ORDER — POLYETHYLENE GLYCOL 3350 17 G/17G
17 POWDER, FOR SOLUTION ORAL 2 TIMES DAILY PRN
Qty: 510 G | Refills: 0 | Status: SHIPPED | OUTPATIENT
Start: 2023-03-13

## 2023-03-13 ASSESSMENT — PAIN SCALES - GENERAL: PAINLEVEL_OUTOF10: 8

## 2023-03-13 ASSESSMENT — LIFESTYLE VARIABLES
HOW MANY STANDARD DRINKS CONTAINING ALCOHOL DO YOU HAVE ON A TYPICAL DAY: 1 OR 2
HOW OFTEN DO YOU HAVE A DRINK CONTAINING ALCOHOL: MONTHLY OR LESS

## 2023-03-13 ASSESSMENT — PAIN DESCRIPTION - ORIENTATION: ORIENTATION: LEFT

## 2023-03-13 ASSESSMENT — PAIN DESCRIPTION - LOCATION: LOCATION: FLANK

## 2023-03-13 ASSESSMENT — PAIN - FUNCTIONAL ASSESSMENT: PAIN_FUNCTIONAL_ASSESSMENT: 0-10

## 2023-03-14 NOTE — ED PROVIDER NOTES
[facility] Emergency Department [facility]    I, Jonathan Mcbride MD, am the primary clinician of record.    CHIEF COMPLAINT  Chief Complaint   Patient presents with    Flank Pain     Left sided flank pain, no urinary complaints, no hx of kidney stones, does have CKD        HISTORY OF PRESENT ILLNESS  Staci Menezes is a 82 y.o. female  who presents to the ED complaining of left flank pain onset yesterday into today.  Denies significant constipation or diarrhea.  She denies dysuria/hematuria.  No fevers or chills.  No R sided abdominal or flank pains.  No trauma or injury.  No CP cough or SOB.  She has CKD history but urination has been baseline for her.  No prior kidney stone history.  Pain worse with movement.  Took medication at home before coming in and does not want any medication here.    No other complaints, modifying factors or associated symptoms.     I have reviewed the following from the nursing documentation.    Past Medical History:   Diagnosis Date    Allergic rhinitis, cause unspecified     Arthritis     Atrial fibrillation (HCC)     Bronchopneumonia     CAD (coronary artery disease)     stent:  post cataract surgery (CABG)    Cerebral artery occlusion with cerebral infarction (HCC)     TIA    Chronic gouty arthropathy     Chronic kidney disease     Congestive heart failure, unspecified     Degeneration of cervical intervertebral disc     Essential and other specified forms of tremor     Gout     HIGH CHOLESTEROL     History of CVA (cerebrovascular accident)     Hx of blood clots     Hypertension     Influenza 12/23/2017    Leakage of Watchman left atrial appendage closure device     Mitral valve stenosis and aortic valve insufficiency     Movement disorder     back problems    Pacemaker     Peptic ulcer, unspecified site, unspecified as acute or chronic, without mention of hemorrhage, perforation, or obstruction     Thyroid disease     Unspecified disorder of kidney and ureter     Unspecified  hypothyroidism      Past Surgical History:   Procedure Laterality Date    BACK SURGERY      CARDIAC CATHETERIZATION  2012    CARDIAC CATHETERIZATION  2018    Unsuccesful  of RCA    CARDIAC SURGERY      CABG & Cardiac ablation    CHOLECYSTECTOMY      CORONARY ARTERY BYPASS GRAFT  1987    LIMA- Diag/LAD, SVG- RCA    FEMORAL BYPASS Left 2019    LEFT FEMORAL TO POPLITEAL BYPASS GRAFT performed by Abby Lanza MD at [address] [facility] GRAFT N/A 2019    FEMORAL TO FEMORAL BYPASS performed by Abby Lanza MD at [address] Left 2017    Left hip pinning    IR KYPHOPLASTY LUMBAR FIRST LEVEL  2021    IR KYPHOPLASTY LUMBAR FIRST LEVEL 2021 [facility] PROCEDURES    JOINT REPLACEMENT      ID NJX AA&/STRD TFRML EPI LUMBAR/SACRAL 1 LEVEL Right 12/3/2018    RIGHT L3 AND L4 LUMBAR TRANSFORAMINAL EPIRUAL STEROID INJECTION WITH FLUOROSCOPY performed by Omar Rae MD at [address]    PTCA  2014    MARLENY - 3.0 x 28 to the Ist Diag    SHOULDER SURGERY      left     Family History   Problem Relation Age of Onset    Cancer Sister     Heart Disease Sister     Diabetes Brother     Hypertension Brother     Heart Disease Brother     Stroke Maternal Aunt     Heart Disease Maternal Aunt     Diabetes Maternal Uncle     Hypertension Paternal Aunt     Cancer Maternal Grandmother     Cancer Paternal Grandmother     Rheum Arthritis Neg Hx     Lupus Neg Hx      Social History     Socioeconomic History    Marital status:       Spouse name: Rossy Payment    Number of children: 3    Years of education: 12    Highest education level: Not on file   Occupational History    Occupation: Retired   Tobacco Use    Smoking status: Former     Packs/day: 1.00     Years: 28.00     Pack years: 28.00     Types: Cigarettes     Quit date: 1987     Years since quittin.2    Smokeless tobacco: Never    Tobacco comments:     H.O.smoking at age 15 / smoked up to 1 p.p.d / quit    Vaping Use    Vaping Use: Never used   Substance and Sexual Activity    Alcohol use: Yes     Comment: social    Drug use: No    Sexual activity: Not Currently   Other Topics Concern    Not on file   Social History Narrative    Not on file     Social Determinants of Health     Financial Resource Strain: Low Risk     Difficulty of Paying Living Expenses: Not hard at all   Food Insecurity: No Food Insecurity    Worried About Running Out of Food in the Last Year: Never true    Ran Out of Food in the Last Year: Never true   Transportation Needs: No Transportation Needs    Lack of Transportation (Medical): No    Lack of Transportation (Non-Medical): No   Physical Activity: Inactive    Days of Exercise per Week: 0 days    Minutes of Exercise per Session: 0 min   Stress: No Stress Concern Present    Feeling of Stress : Not at all   Social Connections: Socially Isolated    Frequency of Communication with Friends and Family: Three times a week    Frequency of Social Gatherings with Friends and Family: Three times a week    Attends Zoroastrian Services: Never    Active Member of Clubs or Organizations: No    Attends Club or Organization Meetings: Never    Marital Status:    Intimate Partner Violence: Not on file   Housing Stability: Low Risk     Unable to Pay for Housing in the Last Year: No    Number of Places Lived in the Last Year: 1    Unstable Housing in the Last Year: No     No current facility-administered medications for this encounter.     Current Outpatient Medications   Medication Sig Dispense Refill    dicyclomine (BENTYL) 10 MG capsule Take 1 capsule by mouth 3 times daily as needed (abdominal cramping) 20 capsule 1    polyethylene glycol (MIRALAX) 17 GM/SCOOP powder Take 17 g by mouth 2 times daily as needed (constipation) 510 g 0    levothyroxine (SYNTHROID) 125 MCG tablet TAKE 1 TABLET EVERY DAY 90 tablet 0    vitamin D (ERGOCALCIFEROL) 1.25 MG (92448 UT) CAPS capsule TAKE 1 CAPSULE ONE TIME WEEKLY 12  capsule 0    albuterol sulfate HFA (PROVENTIL HFA) 108 (90 Base) MCG/ACT inhaler Inhale 2 puffs into the lungs every 4 hours as needed for Wheezing or Shortness of Breath (Space out to every 6 hours as symptoms improve) Space out to every 6 hours as symptoms improve.  18 g 0    famotidine (PEPCID) 40 MG tablet Take 1 tablet by mouth 2 times daily 60 tablet 0    nitroGLYCERIN (NITROLINGUAL) 0.4 MG/SPRAY 0.4 mg spray Place 1 spray under the tongue every 5 minutes as needed for Chest pain 4.9 g 1    torsemide (DEMADEX) 20 MG tablet 10 mg every day except Sunday and as directed for weight gain 60 tablet 1    clopidogrel (PLAVIX) 75 MG tablet Take 1 tablet by mouth daily 90 tablet 2    carvedilol (COREG) 25 MG tablet Take 1 tablet by mouth 2 times daily (with meals) (Patient taking differently: Take 25 mg by mouth 2 times daily) 180 tablet 3    rosuvastatin (CRESTOR) 5 MG tablet Take 1 tablet by mouth nightly 90 tablet 1    aspirin EC 81 MG EC tablet Take 1 tablet by mouth daily 90 tablet 1    topiramate (TOPAMAX) 50 MG tablet TAKE 2 TABLETS TWICE DAILY 360 tablet 0    allopurinol (ZYLOPRIM) 100 MG tablet TAKE 1 TABLET EVERY DAY 90 tablet 1    fluticasone (FLONASE) 50 MCG/ACT nasal spray 2 sprays by Each Nostril route daily 16 g 0     Allergies   Allergen Reactions    Aspirin Nausea Only    Diltiazem Anaphylaxis    Diltiazem Hcl Anaphylaxis    Lorazepam Other (See Comments)     hallucinations  hallucinations  hallucinations    Sulfa Antibiotics Rash and Hives    Atorvastatin Other (See Comments)     Muscle pains  Muscle pains  Muscle pains    Dabigatran Nausea Only     And indigestion  And indigestion    Aka Pradaxa  And indigestion  And indigestion  And indigestion    Aka Pradaxa  And indigestion  And indigestion  And indigestion    Aka Pradaxa    Mysoline [Primidone]     Nsaids      Other reaction(s): Unknown    Other Other (See Comments)     Nitroglycerin patches causes severe headaches    Primidone      Other reaction(s): Unknown       REVIEW OF SYSTEMS  10 systems reviewed, pertinent positives per HPI otherwise noted to be negative. PHYSICAL EXAM  /64   Pulse 71   Temp 97.1 °F (36.2 °C) (Oral)   Resp 18   Ht 5' 3\" (1.6 m)   Wt 111 lb (50.3 kg)   SpO2 98%   BMI 19.66 kg/m²    GENERAL APPEARANCE: Awake and alert. Cooperative. No distress. HENT: Normocephalic. Atraumatic. Mucous membranes are dry. NECK: Supple. EYES: PERRL. EOM's grossly intact. HEART/CHEST: RRR. No murmurs. No chest wall tenderness. LUNGS: Respirations unlabored. CTAB. Good air exchange. Speaking comfortably in full sentences. ABDOMEN: Mild LLQ, L flank and L CVAT, no other abdominal ttp or R flank/CVA tenderness. Soft. Non-distended. No masses. No organomegaly. No guarding or rebound. Normal bowel sounds throughout. MUSCULOSKELETAL: No extremity edema. Compartments soft. No deformity. No tenderness in the extremities. All extremities neurovascularly intact. SKIN: Warm and dry. No acute rashes. NEUROLOGICAL: Alert and oriented. CN's 2-12 intact. No gross facial drooping. Strength 5/5, sensation intact. 2 plus DTR's in knees bilaterally. Gait normal.  PSYCHIATRIC: Normal mood and affect. LABS  I have personally reviewed all labs for this visit.    Results for orders placed or performed during the hospital encounter of 03/13/23   CBC with Auto Differential   Result Value Ref Range    WBC 5.9 4.0 - 11.0 K/uL    RBC 4.80 4.00 - 5.20 M/uL    Hemoglobin 11.3 (L) 12.0 - 16.0 g/dL    Hematocrit 37.7 36.0 - 48.0 %    MCV 78.4 (L) 80.0 - 100.0 fL    MCH 23.6 (L) 26.0 - 34.0 pg    MCHC 30.1 (L) 31.0 - 36.0 g/dL    RDW 24.1 (H) 12.4 - 15.4 %    Platelets 964 203 - 647 K/uL    MPV 9.6 5.0 - 10.5 fL    Neutrophils % 74.0 %    Lymphocytes % 15.4 %    Monocytes % 5.8 %    Eosinophils % 2.7 %    Basophils % 2.1 %    Neutrophils Absolute 4.3 1.7 - 7.7 K/uL    Lymphocytes Absolute 0.9 (L) 1.0 - 5.1 K/uL    Monocytes Absolute 0.3 0.0 - 1.3 K/uL    Eosinophils Absolute 0.2 0.0 - 0.6 K/uL    Basophils Absolute 0.1 0.0 - 0.2 K/uL   Comprehensive Metabolic Panel w/ Reflex to MG   Result Value Ref Range    Sodium 145 136 - 145 mmol/L    Potassium reflex Magnesium 3.7 3.5 - 5.1 mmol/L    Chloride 114 (H) 99 - 110 mmol/L    CO2 22 21 - 32 mmol/L    Anion Gap 9 3 - 16    Glucose 110 (H) 70 - 99 mg/dL    BUN 25 (H) 7 - 20 mg/dL    Creatinine 1.5 (H) 0.6 - 1.2 mg/dL    Est, Glom Filt Rate 34 (A) >60    Calcium 8.8 8.3 - 10.6 mg/dL    Total Protein 6.1 (L) 6.4 - 8.2 g/dL    Albumin 3.5 3.4 - 5.0 g/dL    Albumin/Globulin Ratio 1.3 1.1 - 2.2    Total Bilirubin 0.4 0.0 - 1.0 mg/dL    Alkaline Phosphatase 85 40 - 129 U/L    ALT 7 (L) 10 - 40 U/L    AST 10 (L) 15 - 37 U/L   Lipase   Result Value Ref Range    Lipase 15.0 13.0 - 60.0 U/L   Urinalysis with Reflex to Culture    Specimen: Urine, clean catch   Result Value Ref Range    Color, UA Yellow Straw/Yellow    Clarity, UA Clear Clear    Glucose, Ur Negative Negative mg/dL    Bilirubin Urine Negative Negative    Ketones, Urine Negative Negative mg/dL    Specific Gravity, UA 1.010 1.005 - 1.030    Blood, Urine Negative Negative    pH, UA 5.0 5.0 - 8.0    Protein, UA Negative Negative mg/dL    Urobilinogen, Urine 0.2 <2.0 E.U./dL    Nitrite, Urine Negative Negative    Leukocyte Esterase, Urine SMALL (A) Negative    Microscopic Examination YES     Urine Type NotGiven     Urine Reflex to Culture Not Indicated    Microscopic Urinalysis   Result Value Ref Range    Bacteria, UA None Seen None Seen /HPF    Hyaline Casts, UA 2 0 - 8 /LPF    WBC, UA 2 0 - 5 /HPF    RBC, UA 0 0 - 4 /HPF    Epithelial Cells, UA 3 0 - 5 /HPF     EKG performed in ED:  None    RADIOLOGY  Any applicable radiology studies including x-ray, CT, MRI, and/or ultrasound, were reviewed independently by me in addition to the radiologist.  I reviewed all radiology images and reports as well from this evaluation.     CT ABDOMEN PELVIS WO CONTRAST Additional Contrast? None    Result Date: 3/13/2023  1. Bilateral nonobstructing nephrolithiasis. 2. The etiology for the patient's left flank pain is not clearly identified. 3. Pleural fluid and chronic interstitial changes in the lower chest that appear stable. 4. Dense stool in the colon suggesting underlying constipation. ED COURSE/MDM  Patient seen and evaluated. Old records reviewed. Labs and imaging reviewed. After initial evaluation, differential diagnostic considerations included but not limited to: kidney stone, pyelonephritis, UTI, appendicitis, bowel obstruction, diverticulitis, gastroenteritis    The patient's ED workup was notable for L flank pain, nontraumatic. Shingles considered but no rash. Pain is fairly benign on evaluation and declined pain meds here. UA without UTI/blood. Labs show normal WBC and no fever or other signs of infectious process. Other labs reassuring with stable CKD/creatinine. LFT/lipase unremarkable and no R sided pain so doubt hepatobiliary process. CTAP obtained to evaluate structural surgical or inflammatory/infectious findings and was nonacute except for some dense stool. Not clinically constipated but may be having some bowel spasms? Trial of bentyl and miralax for home, f/u with GI tomorrow already scheduled ironically. Is this patient to be included in the SEP-1 Core Measure? No   Exclusion criteria - the patient is NOT to be included for SEP-1 Core Measure due to: Infection is not suspected      Consults:  None    History obtained from: Patient    Pertinent social determinants of health: None applicable    Chronic conditions potentially affecting care: None applicable    Review of other records:  None    Reassessment:  Not applicable (no medications administered)      CLINICAL IMPRESSION  1. Left flank pain        Blood pressure 114/64, pulse 71, temperature 97.1 °F (36.2 °C), temperature source Oral, resp.  rate 18, height 5' 3\" (1.6 m), weight 111 lb (50.3 kg), SpO2 98 %, not currently breastfeeding. DISPOSITION  Miesha Alfaro was discharged in stable condition. I have discussed the findings of today's workup with the patient and addressed the patient's questions and concerns. Important warning signs as well as new or worsening symptoms which would necessitate immediate return to the ED were discussed. The plan is to discharge from the ED at this time, and the patient is in stable condition. The patient acknowledged understanding is agreeable with this plan. Patient was given scripts for the following medications. I counseled patient how to take these medications. Discharge Medication List as of 3/13/2023  7:53 PM        START taking these medications    Details   dicyclomine (BENTYL) 10 MG capsule Take 1 capsule by mouth 3 times daily as needed (abdominal cramping), Disp-20 capsule, R-1Normal      polyethylene glycol (MIRALAX) 17 GM/SCOOP powder Take 17 g by mouth 2 times daily as needed (constipation), Disp-510 g, R-0Normal             Follow-up with:  [facility] Emergency Department  [address]  [address] [place] [address]  Go to   If symptoms worsen    Magui Zamudio MD  [address]  [phone]    Go in 1 day  For symptom re-evaluation, As already scheduled    Critical care time:  None    DISCLAIMER: This chart was created using Dragon dictation software. Efforts were made by me to ensure accuracy, however some errors may be present due to limitations of this technology and occasionally words are not transcribed correctly.         Beatriz Hunter MD  03/13/23 [id number]

## 2023-03-15 RX ORDER — ALLOPURINOL 100 MG/1
TABLET ORAL
Qty: 90 TABLET | Refills: 1 | Status: SHIPPED | OUTPATIENT
Start: 2023-03-15

## 2023-03-15 NOTE — TELEPHONE ENCOUNTER
Medication:   Requested Prescriptions     Pending Prescriptions Disp Refills    allopurinol (ZYLOPRIM) 100 MG tablet [Pharmacy Med Name: ALLOPURINOL 100 MG Tablet] 90 tablet 1     Sig: TAKE 1 TABLET EVERY DAY      Last Filled:      Patient Phone Number: [phone] (home)     Last appt: 2/27/2023   Next appt: 4/28/2023    Last OARRS:   RX Monitoring 6/1/2021   Attestation -   Periodic Controlled Substance Monitoring Possible medication side effects, risk of tolerance/dependence & alternative treatments discussed.      PDMP Monitoring:    Last PDMP Soraya Arredondo as Reviewed [facility]):  Review User Review Instant Review Result   Richard HANNA 4/21/2022  4:20 PM Reviewed PDMP [1]     Preferred Pharmacy:   [facility] [id number] [phone] [facility] [phone]  [address] [place] [address]  Phone: [phone] Fax: [phone]    [facility] - [place] [phone] - F [phone]  [address] [place] [address]  Phone: [phone] Fax: [phone]

## 2023-03-16 ENCOUNTER — OFFICE VISIT (OUTPATIENT)
Dept: PULMONOLOGY | Age: 83
End: 2023-03-16
Payer: MEDICARE

## 2023-03-16 VITALS
DIASTOLIC BLOOD PRESSURE: 65 MMHG | HEART RATE: 77 BPM | OXYGEN SATURATION: 97 % | WEIGHT: 110.2 LBS | SYSTOLIC BLOOD PRESSURE: 115 MMHG | BODY MASS INDEX: 19.52 KG/M2

## 2023-03-16 DIAGNOSIS — I48.0 PAROXYSMAL ATRIAL FIBRILLATION (HCC): ICD-10-CM

## 2023-03-16 DIAGNOSIS — Z87.891 FORMER SMOKER: ICD-10-CM

## 2023-03-16 DIAGNOSIS — I50.32 CHRONIC DIASTOLIC HEART FAILURE (HCC): ICD-10-CM

## 2023-03-16 DIAGNOSIS — R06.09 DOE (DYSPNEA ON EXERTION): Primary | ICD-10-CM

## 2023-03-16 PROCEDURE — 99214 OFFICE O/P EST MOD 30 MIN: CPT | Performed by: INTERNAL MEDICINE

## 2023-03-16 PROCEDURE — 1111F DSCHRG MED/CURRENT MED MERGE: CPT | Performed by: INTERNAL MEDICINE

## 2023-03-16 PROCEDURE — 1123F ACP DISCUSS/DSCN MKR DOCD: CPT | Performed by: INTERNAL MEDICINE

## 2023-03-16 PROCEDURE — G8484 FLU IMMUNIZE NO ADMIN: HCPCS | Performed by: INTERNAL MEDICINE

## 2023-03-16 PROCEDURE — 1036F TOBACCO NON-USER: CPT | Performed by: INTERNAL MEDICINE

## 2023-03-16 PROCEDURE — G8399 PT W/DXA RESULTS DOCUMENT: HCPCS | Performed by: INTERNAL MEDICINE

## 2023-03-16 PROCEDURE — 3074F SYST BP LT 130 MM HG: CPT | Performed by: INTERNAL MEDICINE

## 2023-03-16 PROCEDURE — 1090F PRES/ABSN URINE INCON ASSESS: CPT | Performed by: INTERNAL MEDICINE

## 2023-03-16 PROCEDURE — G8427 DOCREV CUR MEDS BY ELIG CLIN: HCPCS | Performed by: INTERNAL MEDICINE

## 2023-03-16 PROCEDURE — G8420 CALC BMI NORM PARAMETERS: HCPCS | Performed by: INTERNAL MEDICINE

## 2023-03-16 PROCEDURE — 3078F DIAST BP <80 MM HG: CPT | Performed by: INTERNAL MEDICINE

## 2023-03-16 NOTE — PROGRESS NOTES
PULMONARY OFFICE NEW PATIENT VISIT    CONSULTING PHYSICIAN:      REASON FOR VISIT:   Chief Complaint   Patient presents with    Shortness of Breath       DATE OF VISIT: 3/16/2023    HISTORY OF PRESENT ILLNESS: 80y.o. year old female with past medical history of COPD, atrial fibrillation on Xarelto s/p Watchman procedure, heart failure with ejection fraction, coronary disease s/p CABG, hypertension, hyperlipidemia, CKD stage IIIa who is here for evaluation of COPD. Patient was hospitalized in 2023 for pneumonia. At that time it was noted that she had bilateral patchy groundglass infiltrates on CT chest.  Patient used to see pulmonologist in the past for COPD. She had obstructive disease noted on PFT from  with FEV1 57%. Her latest PFT from 2018 showed improvement in FEV1 but did show reduced diffusion capacity of 47%    Patient has dyspnea on exertion off-and-on. She denies any dyspnea at rest.  She denies any chronic cough or wheezing or chest pain or hemoptysis. Patient has 28-pack-year smoking history. She quit smoking in . Patient has heart failure with preserved ejection fraction as well as atrial fibrillation. She follows up with cardiology. DIAGNOSTIC TEST REVIEWED:  I reviewed the PFT from 2018 and my interpretation is as follows. Mild obstructive airway disease with reduced diffusion capacity. FEV1/FVC 70  FEV1 80%, 1.55 L  FVC 84%, 2.20 L  TLC 82%, 3.72 L  DLCO 47%    I reviewed the PFT from  and my interpretation is as follows. Moderate obstructive airway disease with reduced diffusion capacity. FeV1/FVC: 64  FeV1: 57%, 1.18 Liters  FVC: 68%, 1.84 Liters  T%, 3.62 Liters  DLCO: 55%    REVIEW OF SYSTEMS:   CONSTITUTIONAL SYMPTOMS: The patient denies fever, fatigue, night sweats, weight loss or weight gain. HEENT: No vision changes. No tinnitus, Denies sinus pain. No hoarseness, or dysphagia. NECK: Patient denies swelling in the neck.    CARDIOVASCULAR: Denies chest pain, palpitation, syncope. RESPIRATORY: see above. GASTROINTESTINAL: Denies nausea, abdominal pain or change in bowel function. GENITOURINARY: Denies obstructive symptoms. No history of incontinence. BREASTS: No masses or lumps in the breasts. SKIN: No rashes or itching. MUSCULOSKELETAL: Denies weakness or bone pain. NEUROLOGICAL: No headaches or seizures. PSYCHIATRIC: Denies mood swings or depression. ENDOCRINE: Denies heat or cold intolerance or excessive thirst.  HEMATOLOGIC/LYMPHATIC: Denies easy bruising or lymph node swelling. ALLERGIC/IMMUNOLOGIC: No environmental allergies.     PAST MEDICAL HISTORY:   Past Medical History:   Diagnosis Date    Allergic rhinitis, cause unspecified     Arthritis     Atrial fibrillation (HCC)     Bronchopneumonia     CAD (coronary artery disease)     stent:  post cataract surgery (CABG)    Cerebral artery occlusion with cerebral infarction [facility])     TIA    Chronic gouty arthropathy     Chronic kidney disease     Congestive heart failure, unspecified     Degeneration of cervical intervertebral disc     Essential and other specified forms of tremor     Gout     HIGH CHOLESTEROL     History of CVA (cerebrovascular accident)     Hx of blood clots     Hypertension     Influenza 12/23/2017    Leakage of Watchman left atrial appendage closure device     Mitral valve stenosis and aortic valve insufficiency     Movement disorder     back problems    Pacemaker     Peptic ulcer, unspecified site, unspecified as acute or chronic, without mention of hemorrhage, perforation, or obstruction     Thyroid disease     Unspecified disorder of kidney and ureter     Unspecified hypothyroidism        PAST SURGICAL HISTORY:   Past Surgical History:   Procedure Laterality Date    BACK SURGERY      CARDIAC CATHETERIZATION  7/2012    CARDIAC CATHETERIZATION  08/07/2018    Unsuccesful  of RCA    CARDIAC SURGERY      CABG & Cardiac ablation    CHOLECYSTECTOMY      CORONARY ARTERY BYPASS GRAFT  1987    LIMA- Diag/LAD, SVG- RCA    FEMORAL BYPASS Left 2019    LEFT FEMORAL TO POPLITEAL BYPASS GRAFT performed by Zee Voss MD at [facility] GRAFT N/A 2019    FEMORAL TO FEMORAL BYPASS performed by Zee Voss MD at [address] Left 2017    Left hip pinning    IR KYPHOPLASTY LUMBAR FIRST LEVEL  2021    IR KYPHOPLASTY LUMBAR FIRST LEVEL 2021 [facility] SPECIAL PROCEDURES    JOINT REPLACEMENT      [place] NJX AA&/STRD TFRML EPI LUMBAR/SACRAL 1 LEVEL Right 12/3/2018    RIGHT L3 AND L4 LUMBAR TRANSFORAMINAL EPIRUAL STEROID INJECTION WITH FLUOROSCOPY performed by Samson Farmer MD at [address]    PTCA  2014    MARLENY - 3.0 x 28 to the Ist Diag    SHOULDER SURGERY      left        SOCIAL HISTORY:   Social History     Tobacco Use    Smoking status: Former     Packs/day: 1.00     Years: 28.00     Pack years: 28.00     Types: Cigarettes     Quit date: 1987     Years since quittin.2    Smokeless tobacco: Never    Tobacco comments:     H.O.smoking at age 15 / smoked up to 1 p.p.d / quit    Vaping Use    Vaping Use: Never used   Substance Use Topics    Alcohol use: Yes     Comment: social    Drug use: No       FAMILY HISTORY:   Family History   Problem Relation Age of Onset    Cancer Sister     Heart Disease Sister     Diabetes Brother     Hypertension Brother     Heart Disease Brother     Stroke Maternal Aunt     Heart Disease Maternal Aunt     Diabetes Maternal Uncle     Hypertension Paternal Aunt     Cancer Maternal Grandmother     Cancer Paternal Grandmother     Rheum Arthritis Neg Hx     Lupus Neg Hx        MEDICATIONS:     Current Outpatient Medications on File Prior to Visit   Medication Sig Dispense Refill    allopurinol (ZYLOPRIM) 100 MG tablet TAKE 1 TABLET EVERY DAY 90 tablet 1    dicyclomine (BENTYL) 10 MG capsule Take 1 capsule by mouth 3 times daily as needed (abdominal cramping) 20 capsule 1    polyethylene glycol (MIRALAX) 17 GM/SCOOP powder Take 17 g by mouth 2 times daily as needed (constipation) 510 g 0    levothyroxine (SYNTHROID) 125 MCG tablet TAKE 1 TABLET EVERY DAY 90 tablet 0    vitamin D (ERGOCALCIFEROL) 1.25 MG (79641 UT) CAPS capsule TAKE 1 CAPSULE ONE TIME WEEKLY 12 capsule 0    albuterol sulfate HFA (PROVENTIL HFA) 108 (90 Base) MCG/ACT inhaler Inhale 2 puffs into the lungs every 4 hours as needed for Wheezing or Shortness of Breath (Space out to every 6 hours as symptoms improve) Space out to every 6 hours as symptoms improve. 18 g 0    famotidine (PEPCID) 40 MG tablet Take 1 tablet by mouth 2 times daily 60 tablet 0    nitroGLYCERIN (NITROLINGUAL) 0.4 MG/SPRAY 0.4 mg spray Place 1 spray under the tongue every 5 minutes as needed for Chest pain 4.9 g 1    torsemide (DEMADEX) 20 MG tablet 10 mg every day except Sunday and as directed for weight gain 60 tablet 1    clopidogrel (PLAVIX) 75 MG tablet Take 1 tablet by mouth daily 90 tablet 2    carvedilol (COREG) 25 MG tablet Take 1 tablet by mouth 2 times daily (with meals) (Patient taking differently: Take 25 mg by mouth 2 times daily) 180 tablet 3    rosuvastatin (CRESTOR) 5 MG tablet Take 1 tablet by mouth nightly 90 tablet 1    aspirin EC 81 MG EC tablet Take 1 tablet by mouth daily 90 tablet 1    topiramate (TOPAMAX) 50 MG tablet TAKE 2 TABLETS TWICE DAILY 360 tablet 0    fluticasone (FLONASE) 50 MCG/ACT nasal spray 2 sprays by Each Nostril route daily 16 g 0     No current facility-administered medications on file prior to visit.                ALLERGIES:   Allergies as of 03/16/2023 - Fully Reviewed 03/16/2023   Allergen Reaction Noted    Aspirin Nausea Only 06/02/2016    Diltiazem Anaphylaxis 10/22/2014    Diltiazem hcl Anaphylaxis 01/04/2014    Lorazepam Other (See Comments) 02/03/2017    Sulfa antibiotics Rash and Hives 01/31/2011    Atorvastatin Other (See Comments) 06/05/2019    Dabigatran Nausea Only 03/07/2013    Mysoline [primidone] 09/16/2013    Nsaids  09/16/2013    Other Other (See Comments) 02/03/2017    Primidone  09/16/2013      OBJECTIVE:   weight is 110 lb 3.2 oz (50 kg). Her blood pressure is 115/65 and her pulse is 77. Her oxygen saturation is 97%. PHYSICAL EXAM:    CONSTITUTIONAL: She is a 80y.o.-year-old who appears her stated age. She is alert and oriented x 3 and in no acute distress. HEENT: PERRL. No scleral icterus. No thrush, atraumatic, normocephalic. NECK: Supple, without cervical or supraclavicular lymphadenopathy:  CARDIOVASCULAR: S1 S2 RRR. Without murmer  RESPIRATORY & CHEST: Lungs are clear to auscultation and percussion. No wheezing, no crackles. Good air movement  GASTROINTESTINAL & ABDOMEN: Soft, nontender, positive bowel sounds in all quadrants, non-distended, without hepatosplenomegaly. GENITOURINARY: Deferred. MUSCULOSKELETAL: No tenderness to palpation of the axial skeleton. There is no clubbing. No cyanosis. No edema of the lower extremities. SKIN OF BODY: No rash or jaundice. PSYCHIATRIC EVALUATION: Normal affect. Patient answers questions appropriately. HEMATOLOGIC/LYMPHATIC/ IMMUNOLOGIC: No palpable lymphadenopathy. NEUROLOGIC: Alert and oriented x 3. Groslly non-focal. Motor strength is 5+/5 in all muscle groups. The patient has a normal sensorium globally.       LABS:  Lab Results   Component Value Date    WBC 5.9 03/13/2023    HGB 11.3 (L) 03/13/2023    HCT 37.7 03/13/2023     03/13/2023    CHOL 135 01/23/2022    TRIG 227 (H) 01/23/2022    HDL 27 (L) 01/23/2022    LDLDIRECT 54 07/27/2012    ALT 7 (L) 03/13/2023    AST 10 (L) 03/13/2023     03/13/2023    K 3.7 03/13/2023     (H) 03/13/2023    CREATININE 1.5 (H) 03/13/2023    BUN 25 (H) 03/13/2023    CO2 22 03/13/2023    TSH 0.40 09/11/2022    INR 1.27 (H) 11/12/2022       Lab Results   Component Value Date    GLUCOSE 110 (H) 03/13/2023    CALCIUM 8.8 03/13/2023     03/13/2023    K 3.7 03/13/2023    CO2 22 03/13/2023     (H) 03/13/2023    BUN 25 (H) 03/13/2023    CREATININE 1.5 (H) 03/13/2023           ASSESSMENT AND PLAN:     1. SCHMID (dyspnea on exertion)  Patient complains of off-and-on dyspnea on exertion. PFT from 2013 was suggestive of moderate obstructive airway disease and reduced diffusion capacity. PFT from 2018 showed improvement in FEV1  I will obtain PFTs for further evaluation. In the meantime, patient will continue to use albuterol inhaler as needed. - Full PFT Study With Bronchodilator; Future    2. Chronic diastolic heart failure ([address]  Patient is on Demadex. Follows with cardiology. Looks compensated    3. Former smoker  Patient quit smoking in 1987    4. Paroxysmal atrial fibrillation (HCC)  On Xarelto. Return in about 8 weeks (around 5/11/2023). Keri Walker MD  Pulmonary Critical Care and Sleep Medicine  Electronically signed by Keri Walker MD on 3/16/2023 at 1:36 PM     This note was completed using dragon medical speech recognition software. Grammatical errors, random word insertions, pronoun errors and incomplete sentences are occasional consequences of this technology due to software limitations. If there are questions or concerns about the content of this note of information contained within the body of this dictation they should be addressed with the provider for clarification.

## 2023-03-20 DIAGNOSIS — M15.9 PRIMARY OSTEOARTHRITIS INVOLVING MULTIPLE JOINTS: ICD-10-CM

## 2023-03-20 RX ORDER — HYDROCODONE BITARTRATE AND ACETAMINOPHEN 5; 325 MG/1; MG/1
1 TABLET ORAL 4 TIMES DAILY PRN
Qty: 120 TABLET | Refills: 0 | Status: SHIPPED | OUTPATIENT
Start: 2023-03-20 | End: 2023-04-19

## 2023-03-20 NOTE — TELEPHONE ENCOUNTER
Medication:   Requested Prescriptions     Pending Prescriptions Disp Refills    HYDROcodone-acetaminophen (NORCO) 5-325 MG per tablet 120 tablet 0     Sig: Take 1 tablet by mouth 4 times daily as needed for Pain for up to 30 days. Last Filled:  1/10/2023    Patient Phone Number: [phone] (home)     Last appt: 2/27/2023   Next appt: 4/28/2023    Last OARRS:   RX Monitoring 6/1/2021   Attestation -   Periodic Controlled Substance Monitoring Possible medication side effects, risk of tolerance/dependence & alternative treatments discussed.      PDMP Monitoring:    Last PDMP Yoav Beach as Reviewed [facility]):  Review User Review Instant Review Result   Esther HANNA 4/21/2022  4:20 PM Reviewed PDMP [1]     Preferred Pharmacy:   [facility] [id number] [phone] [place] [facility] [phone]  [address] [place] [address]  Phone: [phone] Fax: [phone]    [facility] Mail Delivery - [facility] [phone] - F [phone]  [address] [place] [address]  Phone: [phone] Fax: [phone]

## 2023-03-20 NOTE — TELEPHONE ENCOUNTER
HYDROcodone-acetaminophen (NORCO) 5-325 MG per tablet 1 tablet       Roman Nugent [id number] [phone] Annette Langley [phone]   [address] [address]   Phone:  [phone]  Fax:  [phone]

## 2023-04-20 NOTE — PLAN OF CARE
Amparo Hess 69 year old female presents with :     Chief Complaint   Patient presents with   • Office Visit   • Medicare Wellness Visit     Subsequent           Tobacco history verified.  Medications reviewed and updated with patient.  Allergies reviewed and updated with patient.    Patient would like communication of their results via:        Cell Phone:   Telephone Information:   Mobile [phone]     Okay to leave a message containing results? Yes     Health Maintenance Due   Topic Date Due   • Colorectal Cancer Screen-  11/06/2016   • Medicare Advantage- Medicare Wellness Visit  01/01/2023       Patient is due for topics as listed above but is not proceeding with Colorectal Cancer Screening: Colonoscopy at this time. Will discuss with MD.     Advance Directives:  discussed and advised the patient to complete one    Depression Screening Done   Problem: Chronic Conditions and Co-morbidities  Goal: Patient's chronic conditions and co-morbidity symptoms are monitored and maintained or improved  Outcome: Progressing  Flowsheets (Taken 10/20/2022 0800)  Care Plan - Patient's Chronic Conditions and Co-Morbidity Symptoms are Monitored and Maintained or Improved: Monitor and assess patient's chronic conditions and comorbid symptoms for stability, deterioration, or improvement     Problem: Discharge Planning  Goal: Discharge to home or other facility with appropriate resources  Outcome: Progressing  Flowsheets (Taken 10/20/2022 0800)  Discharge to home or other facility with appropriate resources: Identify barriers to discharge with patient and caregiver     Problem: Safety - Adult  Goal: Free from fall injury  Outcome: Progressing  Flowsheets (Taken 10/20/2022 0903)  Free From Fall Injury:   Based on caregiver fall risk screen, instruct family/caregiver to ask for assistance with transferring infant if caregiver noted to have fall risk factors   Instruct family/caregiver on patient safety     Problem: Pain  Goal: Verbalizes/displays adequate comfort level or baseline comfort level  Outcome: Progressing  Flowsheets  Taken 10/20/2022 0902 by Mariusz Billings RN  Verbalizes/displays adequate comfort level or baseline comfort level: Encourage patient to monitor pain and request assistance  Taken 10/20/2022 0000 by Kimberly Weir RN  Verbalizes/displays adequate comfort level or baseline comfort level:   Assess pain using appropriate pain scale   Encourage patient to monitor pain and request assistance   Administer analgesics based on type and severity of pain and evaluate response

## 2023-04-25 ENCOUNTER — HOSPITAL ENCOUNTER (OUTPATIENT)
Dept: PULMONOLOGY | Age: 83
Discharge: HOME | End: 2023-04-25
Payer: MEDICARE

## 2023-04-25 VITALS — HEART RATE: 71 BPM | RESPIRATION RATE: 16 BRPM | OXYGEN SATURATION: 100 %

## 2023-04-25 DIAGNOSIS — R06.09 DOE (DYSPNEA ON EXERTION): ICD-10-CM

## 2023-04-25 LAB
DLCO %PRED: 89 %
DLCO PRED: NORMAL
DLCO/VA %PRED: NORMAL
DLCO/VA PRED: NORMAL
DLCO/VA: NORMAL
DLCO: NORMAL
EXPIRATORY TIME-POST: NORMAL
EXPIRATORY TIME: NORMAL
FEF 25-75% %CHNG: NORMAL
FEF 25-75% %PRED-POST: NORMAL
FEF 25-75% %PRED-PRE: NORMAL
FEF 25-75% PRED: NORMAL
FEF 25-75%-POST: NORMAL
FEF 25-75%-PRE: NORMAL
FEV1 %PRED-POST: 66 %
FEV1 %PRED-PRE: 65 %
FEV1 PRED: NORMAL
FEV1-POST: NORMAL
FEV1-PRE: NORMAL
FEV1/FVC %PRED-POST: NORMAL
FEV1/FVC %PRED-PRE: NORMAL
FEV1/FVC PRED: NORMAL
FEV1/FVC-POST: 93 %
FEV1/FVC-PRE: 91 %
FVC %PRED-POST: NORMAL
FVC %PRED-PRE: NORMAL
FVC PRED: NORMAL
FVC-POST: NORMAL
FVC-PRE: NORMAL
GAW %PRED: NORMAL
GAW PRED: NORMAL
GAW: NORMAL
IC %PRED: NORMAL
IC PRED: NORMAL
IC: NORMAL
MEP: NORMAL
MIP: NORMAL
MVV %PRED-PRE: NORMAL
MVV PRED: NORMAL
MVV-PRE: NORMAL
PEF %PRED-POST: NORMAL
PEF %PRED-PRE: NORMAL
PEF PRED: NORMAL
PEF%CHNG: NORMAL
PEF-POST: NORMAL
PEF-PRE: NORMAL
RAW %PRED: NORMAL
RAW PRED: NORMAL
RAW: NORMAL
RV %PRED: NORMAL
RV PRED: NORMAL
RV: NORMAL
SVC %PRED: NORMAL
SVC PRED: NORMAL
SVC: NORMAL
TLC %PRED: 77 %
TLC PRED: NORMAL
TLC: NORMAL
VA %PRED: NORMAL
VA PRED: NORMAL
VA: NORMAL
VTG %PRED: NORMAL
VTG PRED: NORMAL
VTG: NORMAL

## 2023-04-25 PROCEDURE — 94729 DIFFUSING CAPACITY: CPT

## 2023-04-25 PROCEDURE — 94760 N-INVAS EAR/PLS OXIMETRY 1: CPT

## 2023-04-25 PROCEDURE — 94060 EVALUATION OF WHEEZING: CPT

## 2023-04-25 PROCEDURE — 6370000000 HC RX 637 (ALT 250 FOR IP): Performed by: INTERNAL MEDICINE

## 2023-04-25 PROCEDURE — 94726 PLETHYSMOGRAPHY LUNG VOLUMES: CPT

## 2023-04-25 RX ORDER — ALBUTEROL SULFATE 90 UG/1
4 AEROSOL, METERED RESPIRATORY (INHALATION) ONCE
Status: COMPLETED | OUTPATIENT
Start: 2023-04-25 | End: 2023-04-25

## 2023-04-25 RX ADMIN — Medication 4 PUFF: at 15:19

## 2023-04-25 ASSESSMENT — PULMONARY FUNCTION TESTS
FEV1_PERCENT_PREDICTED_POST: 66
FEV1/FVC_PRE: 91
FEV1_PERCENT_PREDICTED_PRE: 65
FEV1/FVC_POST: 93

## 2023-04-27 NOTE — PROCEDURES
Pulmonary Function Testing      Patient name:  Charissa Damon     [facility] Unit #:   [id number]   Date of test:  4/25/2023  Date of interpretation:   4/27/2023    Ms. Charissa Damon is a 80y.o. year-old non smoker. The spirometry data were acceptable and reproducible. Spirometry:  Flow volume loops were obstructed. The FEV-1/FVC ratio was decreased. The FEV-1 was 1.16 liters (65% of predicted), which was moderately decreased. The FVC was 1.74 liters (72% of predicted), which was decreased. Response to inhaled bronchodilators (albuterol) was not significant. Lung volumes:  Lung volumes were tested by plethysmography. The total lung capacity was 3.46 liters (77% of predicted), which was decreased. The residual volume was 1.72 liters (88% of predicted), which was normal. The ratio of residual volume to total lung capacity (RV/TLC) was 50, which was normal.     Diffusion capacity was found to be 89% which is Normal.      Interpretation:  Moderate obstruction with no significant bronchodilator reversibility.     Comments:

## 2023-04-28 ENCOUNTER — OFFICE VISIT (OUTPATIENT)
Dept: FAMILY MEDICINE CLINIC | Age: 83
End: 2023-04-28

## 2023-04-28 VITALS
DIASTOLIC BLOOD PRESSURE: 70 MMHG | OXYGEN SATURATION: 95 % | TEMPERATURE: 97.2 F | HEART RATE: 72 BPM | SYSTOLIC BLOOD PRESSURE: 134 MMHG | BODY MASS INDEX: 20.02 KG/M2 | WEIGHT: 113 LBS

## 2023-04-28 DIAGNOSIS — I73.9 PAD (PERIPHERAL ARTERY DISEASE) (HCC): ICD-10-CM

## 2023-04-28 DIAGNOSIS — I50.42 CHRONIC COMBINED SYSTOLIC (CONGESTIVE) AND DIASTOLIC (CONGESTIVE) HEART FAILURE (HCC): ICD-10-CM

## 2023-04-28 DIAGNOSIS — M15.9 PRIMARY OSTEOARTHRITIS INVOLVING MULTIPLE JOINTS: Primary | ICD-10-CM

## 2023-04-28 DIAGNOSIS — J44.9 CHRONIC OBSTRUCTIVE PULMONARY DISEASE, UNSPECIFIED COPD TYPE (HCC): ICD-10-CM

## 2023-04-28 DIAGNOSIS — R41.3 MEMORY LOSS DUE TO MEDICAL CONDITION: ICD-10-CM

## 2023-04-28 PROBLEM — J18.9 PNEUMONIA INVOLVING RIGHT LUNG: Status: RESOLVED | Noted: 2023-02-11 | Resolved: 2023-04-28

## 2023-04-28 PROBLEM — R06.09 DOE (DYSPNEA ON EXERTION): Status: RESOLVED | Noted: 2022-09-10 | Resolved: 2023-04-28

## 2023-04-28 PROBLEM — R06.00 DYSPNEA: Status: RESOLVED | Noted: 2023-02-10 | Resolved: 2023-04-28

## 2023-04-28 RX ORDER — HYDROCODONE BITARTRATE AND ACETAMINOPHEN 5; 325 MG/1; MG/1
1 TABLET ORAL 4 TIMES DAILY PRN
Qty: 120 TABLET | Refills: 0 | Status: ON HOLD | OUTPATIENT
Start: 2023-04-28 | End: 2023-05-28

## 2023-04-28 ASSESSMENT — PATIENT HEALTH QUESTIONNAIRE - PHQ9
2. FEELING DOWN, DEPRESSED OR HOPELESS: 0
SUM OF ALL RESPONSES TO PHQ QUESTIONS 1-9: 0
1. LITTLE INTEREST OR PLEASURE IN DOING THINGS: 0
SUM OF ALL RESPONSES TO PHQ9 QUESTIONS 1 & 2: 0
SUM OF ALL RESPONSES TO PHQ QUESTIONS 1-9: 0

## 2023-04-28 NOTE — PROGRESS NOTES
Subjective:      Patient ID: Mir Fermin is a 80 y.o. female. CC: Patient presents for re-evaluation of chronic health problems including right low back pain, osteoarthritis, COPD, heart failure, memory loss and peripheral dural disease. HPIPatient presents today for a follow-up on chronic medications and medical conditions in accompaniment of daughter. Patient is complaining of her right hip bothering her again. She actually points to the right lower back. She has had issues with this in the past and was ultimately determined to be osteoarthritis. She went to the emergency room March 13 with left flank pain found to be constipated at that point in time. Since that time her bowels been working more regular and she feels better in that regards. She does have pulmonary function test performed and has a follow-up appointment set with pulmonologist next week. She had a fall with pulmonology and creatinine is stable at 1.5 and no change of therapy. Her appetite has improved and she is eating much better at this point of time.     Review of Systems      Patient Active Problem List   Diagnosis    Essential hypertension, benign    Mixed hyperlipidemia    Chronic gouty arthropathy    Acquired hypothyroidism    Non-rheumatic mitral regurgitation    COPD (chronic obstructive pulmonary disease) (HCC)    Restrictive lung disease    Sick sinus syndrome (HCC)    Allergic rhinitis    Fibrocystic breast    Cerebrovascular accident (CVA) ([address]    HTN (hypertension), benign    Pacemaker    PAT (paroxysmal atrial tachycardia) ([facility])    Primary osteoarthritis involving multiple joints    Vitamin D deficiency    Tremor    Chronic total occlusion of native coronary artery    Age related osteoporosis    Chronic combined systolic (congestive) and diastolic (congestive) heart failure (HCC)    Stage 3 chronic kidney disease (HCC)    PAD (peripheral artery disease) ([address]    Atherosclerosis of native arteries of extremities with

## 2023-04-30 ENCOUNTER — APPOINTMENT (OUTPATIENT)
Dept: GENERAL RADIOLOGY | Age: 83
DRG: 291 | End: 2023-04-30
Payer: MEDICARE

## 2023-04-30 ENCOUNTER — HOSPITAL ENCOUNTER (INPATIENT)
Age: 83
LOS: 1 days | Discharge: HOME HEALTH | DRG: 291 | End: 2023-05-01
Attending: EMERGENCY MEDICINE | Admitting: HOSPITALIST
Payer: MEDICARE

## 2023-04-30 DIAGNOSIS — R77.8 ELEVATED TROPONIN: ICD-10-CM

## 2023-04-30 DIAGNOSIS — N18.9 CHRONIC KIDNEY DISEASE, UNSPECIFIED CKD STAGE: ICD-10-CM

## 2023-04-30 DIAGNOSIS — I50.9 ACUTE DECOMPENSATED HEART FAILURE (HCC): Primary | ICD-10-CM

## 2023-04-30 PROBLEM — R06.02 SOB (SHORTNESS OF BREATH): Status: ACTIVE | Noted: 2023-04-30

## 2023-04-30 LAB
ALBUMIN SERPL-MCNC: 3.8 G/DL (ref 3.4–5)
ALBUMIN/GLOB SERPL: 1.5 {RATIO} (ref 1.1–2.2)
ALP SERPL-CCNC: 99 U/L (ref 40–129)
ALT SERPL-CCNC: 21 U/L (ref 10–40)
ANION GAP SERPL CALCULATED.3IONS-SCNC: 11 MMOL/L (ref 3–16)
AST SERPL-CCNC: 21 U/L (ref 15–37)
BASOPHILS # BLD: 0.1 K/UL (ref 0–0.2)
BASOPHILS NFR BLD: 1.5 %
BILIRUB SERPL-MCNC: 0.3 MG/DL (ref 0–1)
BUN SERPL-MCNC: 38 MG/DL (ref 7–20)
CALCIUM SERPL-MCNC: 8.8 MG/DL (ref 8.3–10.6)
CHLORIDE SERPL-SCNC: 113 MMOL/L (ref 99–110)
CO2 SERPL-SCNC: 18 MMOL/L (ref 21–32)
CREAT SERPL-MCNC: 1.5 MG/DL (ref 0.6–1.2)
DEPRECATED RDW RBC AUTO: 24.5 % (ref 12.4–15.4)
EOSINOPHIL # BLD: 0.2 K/UL (ref 0–0.6)
EOSINOPHIL NFR BLD: 4.3 %
GFR SERPLBLD CREATININE-BSD FMLA CKD-EPI: 34 ML/MIN/{1.73_M2}
GLUCOSE SERPL-MCNC: 133 MG/DL (ref 70–99)
HCT VFR BLD AUTO: 40.4 % (ref 36–48)
HGB BLD-MCNC: 12.1 G/DL (ref 12–16)
LYMPHOCYTES # BLD: 0.8 K/UL (ref 1–5.1)
LYMPHOCYTES NFR BLD: 16.4 %
MCH RBC QN AUTO: 25.2 PG (ref 26–34)
MCHC RBC AUTO-ENTMCNC: 30.1 G/DL (ref 31–36)
MCV RBC AUTO: 84 FL (ref 80–100)
MONOCYTES # BLD: 0.2 K/UL (ref 0–1.3)
MONOCYTES NFR BLD: 4.5 %
NEUTROPHILS # BLD: 3.4 K/UL (ref 1.7–7.7)
NEUTROPHILS NFR BLD: 73.3 %
NT-PROBNP SERPL-MCNC: 6017 PG/ML (ref 0–449)
PLATELET # BLD AUTO: 243 K/UL (ref 135–450)
PMV BLD AUTO: 10 FL (ref 5–10.5)
POTASSIUM SERPL-SCNC: 4.4 MMOL/L (ref 3.5–5.1)
PROT SERPL-MCNC: 6.3 G/DL (ref 6.4–8.2)
RBC # BLD AUTO: 4.8 M/UL (ref 4–5.2)
SODIUM SERPL-SCNC: 142 MMOL/L (ref 136–145)
TROPONIN, HIGH SENSITIVITY: 29 NG/L (ref 0–14)
WBC # BLD AUTO: 4.6 K/UL (ref 4–11)

## 2023-04-30 PROCEDURE — 6370000000 HC RX 637 (ALT 250 FOR IP): Performed by: HOSPITALIST

## 2023-04-30 PROCEDURE — 85025 COMPLETE CBC W/AUTO DIFF WBC: CPT

## 2023-04-30 PROCEDURE — 1200000000 HC SEMI PRIVATE

## 2023-04-30 PROCEDURE — 84484 ASSAY OF TROPONIN QUANT: CPT

## 2023-04-30 PROCEDURE — 93005 ELECTROCARDIOGRAM TRACING: CPT | Performed by: EMERGENCY MEDICINE

## 2023-04-30 PROCEDURE — 83880 ASSAY OF NATRIURETIC PEPTIDE: CPT

## 2023-04-30 PROCEDURE — 71045 X-RAY EXAM CHEST 1 VIEW: CPT

## 2023-04-30 PROCEDURE — 99285 EMERGENCY DEPT VISIT HI MDM: CPT

## 2023-04-30 PROCEDURE — 80053 COMPREHEN METABOLIC PANEL: CPT

## 2023-04-30 PROCEDURE — 2580000003 HC RX 258: Performed by: HOSPITALIST

## 2023-04-30 PROCEDURE — 6360000002 HC RX W HCPCS: Performed by: EMERGENCY MEDICINE

## 2023-04-30 PROCEDURE — 2060000000 HC ICU INTERMEDIATE R&B

## 2023-04-30 RX ORDER — LEVOTHYROXINE SODIUM 0.12 MG/1
125 TABLET ORAL
Status: DISCONTINUED | OUTPATIENT
Start: 2023-05-01 | End: 2023-05-01 | Stop reason: HOSPADM

## 2023-04-30 RX ORDER — CARVEDILOL 25 MG/1
25 TABLET ORAL 2 TIMES DAILY WITH MEALS
Status: DISCONTINUED | OUTPATIENT
Start: 2023-04-30 | End: 2023-05-01 | Stop reason: HOSPADM

## 2023-04-30 RX ORDER — LANOLIN ALCOHOL/MO/W.PET/CERES
3 CREAM (GRAM) TOPICAL NIGHTLY PRN
Status: DISCONTINUED | OUTPATIENT
Start: 2023-04-30 | End: 2023-05-01 | Stop reason: HOSPADM

## 2023-04-30 RX ORDER — NITROGLYCERIN 0.4 MG/1
0.4 TABLET SUBLINGUAL EVERY 5 MIN PRN
Status: DISCONTINUED | OUTPATIENT
Start: 2023-04-30 | End: 2023-05-01 | Stop reason: HOSPADM

## 2023-04-30 RX ORDER — SODIUM CHLORIDE 9 MG/ML
INJECTION, SOLUTION INTRAVENOUS PRN
Status: DISCONTINUED | OUTPATIENT
Start: 2023-04-30 | End: 2023-05-01 | Stop reason: HOSPADM

## 2023-04-30 RX ORDER — POLYETHYLENE GLYCOL 3350 17 G/17G
17 POWDER, FOR SOLUTION ORAL DAILY PRN
Status: DISCONTINUED | OUTPATIENT
Start: 2023-04-30 | End: 2023-05-01 | Stop reason: HOSPADM

## 2023-04-30 RX ORDER — FUROSEMIDE 10 MG/ML
40 INJECTION INTRAMUSCULAR; INTRAVENOUS 2 TIMES DAILY
Status: DISCONTINUED | OUTPATIENT
Start: 2023-05-01 | End: 2023-05-01

## 2023-04-30 RX ORDER — ROSUVASTATIN CALCIUM 10 MG/1
5 TABLET, COATED ORAL NIGHTLY
Status: DISCONTINUED | OUTPATIENT
Start: 2023-04-30 | End: 2023-05-01 | Stop reason: HOSPADM

## 2023-04-30 RX ORDER — TOPIRAMATE 100 MG/1
100 TABLET, FILM COATED ORAL 2 TIMES DAILY
Status: DISCONTINUED | OUTPATIENT
Start: 2023-04-30 | End: 2023-05-01 | Stop reason: HOSPADM

## 2023-04-30 RX ORDER — ACETAMINOPHEN 650 MG/1
650 SUPPOSITORY RECTAL EVERY 6 HOURS PRN
Status: DISCONTINUED | OUTPATIENT
Start: 2023-04-30 | End: 2023-05-01 | Stop reason: HOSPADM

## 2023-04-30 RX ORDER — FAMOTIDINE 20 MG/1
40 TABLET, FILM COATED ORAL 2 TIMES DAILY
Status: DISCONTINUED | OUTPATIENT
Start: 2023-04-30 | End: 2023-04-30 | Stop reason: ALTCHOICE

## 2023-04-30 RX ORDER — SODIUM CHLORIDE 0.9 % (FLUSH) 0.9 %
5-40 SYRINGE (ML) INJECTION EVERY 12 HOURS SCHEDULED
Status: DISCONTINUED | OUTPATIENT
Start: 2023-04-30 | End: 2023-05-01 | Stop reason: HOSPADM

## 2023-04-30 RX ORDER — ONDANSETRON 4 MG/1
4 TABLET, ORALLY DISINTEGRATING ORAL EVERY 8 HOURS PRN
Status: DISCONTINUED | OUTPATIENT
Start: 2023-04-30 | End: 2023-05-01 | Stop reason: HOSPADM

## 2023-04-30 RX ORDER — POLYETHYLENE GLYCOL 3350 17 G/17G
17 POWDER, FOR SOLUTION ORAL 2 TIMES DAILY PRN
Status: DISCONTINUED | OUTPATIENT
Start: 2023-04-30 | End: 2023-04-30 | Stop reason: SDUPTHER

## 2023-04-30 RX ORDER — ASPIRIN 81 MG/1
81 TABLET ORAL DAILY
Status: DISCONTINUED | OUTPATIENT
Start: 2023-05-01 | End: 2023-04-30

## 2023-04-30 RX ORDER — CLOPIDOGREL BISULFATE 75 MG/1
75 TABLET ORAL DAILY
Status: DISCONTINUED | OUTPATIENT
Start: 2023-05-01 | End: 2023-05-01 | Stop reason: HOSPADM

## 2023-04-30 RX ORDER — SODIUM CHLORIDE 0.9 % (FLUSH) 0.9 %
5-40 SYRINGE (ML) INJECTION PRN
Status: DISCONTINUED | OUTPATIENT
Start: 2023-04-30 | End: 2023-05-01 | Stop reason: HOSPADM

## 2023-04-30 RX ORDER — ACETAMINOPHEN 325 MG/1
650 TABLET ORAL EVERY 6 HOURS PRN
Status: DISCONTINUED | OUTPATIENT
Start: 2023-04-30 | End: 2023-05-01 | Stop reason: HOSPADM

## 2023-04-30 RX ORDER — FUROSEMIDE 10 MG/ML
40 INJECTION INTRAMUSCULAR; INTRAVENOUS ONCE
Status: COMPLETED | OUTPATIENT
Start: 2023-04-30 | End: 2023-04-30

## 2023-04-30 RX ORDER — FLUTICASONE PROPIONATE 50 MCG
2 SPRAY, SUSPENSION (ML) NASAL DAILY
Status: DISCONTINUED | OUTPATIENT
Start: 2023-05-01 | End: 2023-05-01 | Stop reason: HOSPADM

## 2023-04-30 RX ORDER — DICYCLOMINE HYDROCHLORIDE 10 MG/1
10 CAPSULE ORAL 3 TIMES DAILY PRN
Status: DISCONTINUED | OUTPATIENT
Start: 2023-04-30 | End: 2023-04-30 | Stop reason: ALTCHOICE

## 2023-04-30 RX ORDER — ONDANSETRON 2 MG/ML
4 INJECTION INTRAMUSCULAR; INTRAVENOUS EVERY 6 HOURS PRN
Status: DISCONTINUED | OUTPATIENT
Start: 2023-04-30 | End: 2023-05-01 | Stop reason: HOSPADM

## 2023-04-30 RX ORDER — HYDROCODONE BITARTRATE AND ACETAMINOPHEN 5; 325 MG/1; MG/1
1 TABLET ORAL 4 TIMES DAILY PRN
Status: DISCONTINUED | OUTPATIENT
Start: 2023-04-30 | End: 2023-05-01 | Stop reason: HOSPADM

## 2023-04-30 RX ORDER — ALBUTEROL SULFATE 90 UG/1
2 AEROSOL, METERED RESPIRATORY (INHALATION) EVERY 4 HOURS PRN
Status: DISCONTINUED | OUTPATIENT
Start: 2023-04-30 | End: 2023-05-01 | Stop reason: HOSPADM

## 2023-04-30 RX ORDER — ENOXAPARIN SODIUM 100 MG/ML
30 INJECTION SUBCUTANEOUS DAILY
Status: DISCONTINUED | OUTPATIENT
Start: 2023-04-30 | End: 2023-05-01 | Stop reason: HOSPADM

## 2023-04-30 RX ADMIN — SODIUM CHLORIDE, PRESERVATIVE FREE 10 ML: 5 INJECTION INTRAVENOUS at 20:35

## 2023-04-30 RX ADMIN — FUROSEMIDE 40 MG: 10 INJECTION, SOLUTION INTRAMUSCULAR; INTRAVENOUS at 15:22

## 2023-04-30 RX ADMIN — TOPIRAMATE 100 MG: 100 TABLET, FILM COATED ORAL at 20:35

## 2023-04-30 RX ADMIN — ROSUVASTATIN 5 MG: 10 TABLET, FILM COATED ORAL at 20:34

## 2023-04-30 ASSESSMENT — PAIN SCALES - GENERAL: PAINLEVEL_OUTOF10: 0

## 2023-05-01 VITALS
BODY MASS INDEX: 19.67 KG/M2 | HEIGHT: 63 IN | OXYGEN SATURATION: 97 % | RESPIRATION RATE: 17 BRPM | TEMPERATURE: 97.2 F | SYSTOLIC BLOOD PRESSURE: 132 MMHG | HEART RATE: 89 BPM | DIASTOLIC BLOOD PRESSURE: 63 MMHG | WEIGHT: 111 LBS

## 2023-05-01 PROBLEM — N18.9 CHRONIC KIDNEY DISEASE: Status: ACTIVE | Noted: 2019-07-22

## 2023-05-01 PROBLEM — R79.89 ELEVATED TROPONIN: Status: ACTIVE | Noted: 2023-05-01

## 2023-05-01 PROBLEM — R77.8 ELEVATED TROPONIN: Status: ACTIVE | Noted: 2023-05-01

## 2023-05-01 LAB
ANION GAP SERPL CALCULATED.3IONS-SCNC: 8 MMOL/L (ref 3–16)
BACTERIA URNS QL MICRO: ABNORMAL /HPF
BASOPHILS # BLD: 0 K/UL (ref 0–0.2)
BASOPHILS NFR BLD: 0.9 %
BILIRUB UR QL STRIP.AUTO: NEGATIVE
BUN SERPL-MCNC: 38 MG/DL (ref 7–20)
CALCIUM SERPL-MCNC: 8.6 MG/DL (ref 8.3–10.6)
CHLORIDE SERPL-SCNC: 112 MMOL/L (ref 99–110)
CLARITY UR: ABNORMAL
CO2 SERPL-SCNC: 21 MMOL/L (ref 21–32)
COLOR UR: YELLOW
CREAT SERPL-MCNC: 1.4 MG/DL (ref 0.6–1.2)
DEPRECATED RDW RBC AUTO: 24.7 % (ref 12.4–15.4)
EOSINOPHIL # BLD: 0.2 K/UL (ref 0–0.6)
EOSINOPHIL NFR BLD: 4.2 %
EPI CELLS #/AREA URNS AUTO: 26 /HPF (ref 0–5)
GFR SERPLBLD CREATININE-BSD FMLA CKD-EPI: 37 ML/MIN/{1.73_M2}
GLUCOSE SERPL-MCNC: 93 MG/DL (ref 70–99)
GLUCOSE UR STRIP.AUTO-MCNC: NEGATIVE MG/DL
HCT VFR BLD AUTO: 38 % (ref 36–48)
HGB BLD-MCNC: 11.5 G/DL (ref 12–16)
HGB UR QL STRIP.AUTO: NEGATIVE
HYALINE CASTS #/AREA URNS AUTO: 0 /LPF (ref 0–8)
KETONES UR STRIP.AUTO-MCNC: NEGATIVE MG/DL
LEUKOCYTE ESTERASE UR QL STRIP.AUTO: ABNORMAL
LYMPHOCYTES # BLD: 0.9 K/UL (ref 1–5.1)
LYMPHOCYTES NFR BLD: 17.3 %
MCH RBC QN AUTO: 24.6 PG (ref 26–34)
MCHC RBC AUTO-ENTMCNC: 30.2 G/DL (ref 31–36)
MCV RBC AUTO: 81.5 FL (ref 80–100)
MONOCYTES # BLD: 0.3 K/UL (ref 0–1.3)
MONOCYTES NFR BLD: 5.4 %
NEUTROPHILS # BLD: 3.8 K/UL (ref 1.7–7.7)
NEUTROPHILS NFR BLD: 72.2 %
NITRITE UR QL STRIP.AUTO: NEGATIVE
PH UR STRIP.AUTO: 6.5 [PH] (ref 5–8)
PLATELET # BLD AUTO: 234 K/UL (ref 135–450)
PMV BLD AUTO: 9.1 FL (ref 5–10.5)
POTASSIUM SERPL-SCNC: 3.8 MMOL/L (ref 3.5–5.1)
PROT UR STRIP.AUTO-MCNC: ABNORMAL MG/DL
RBC # BLD AUTO: 4.66 M/UL (ref 4–5.2)
RBC CLUMPS #/AREA URNS AUTO: 1 /HPF (ref 0–4)
SODIUM SERPL-SCNC: 141 MMOL/L (ref 136–145)
SP GR UR STRIP.AUTO: 1.01 (ref 1–1.03)
UA COMPLETE W REFLEX CULTURE PNL UR: YES
UA DIPSTICK W REFLEX MICRO PNL UR: YES
URN SPEC COLLECT METH UR: ABNORMAL
UROBILINOGEN UR STRIP-ACNC: 0.2 E.U./DL
WBC # BLD AUTO: 5.3 K/UL (ref 4–11)
WBC #/AREA URNS AUTO: 91 /HPF (ref 0–5)

## 2023-05-01 PROCEDURE — 36415 COLL VENOUS BLD VENIPUNCTURE: CPT

## 2023-05-01 PROCEDURE — 85025 COMPLETE CBC W/AUTO DIFF WBC: CPT

## 2023-05-01 PROCEDURE — 6370000000 HC RX 637 (ALT 250 FOR IP): Performed by: FAMILY MEDICINE

## 2023-05-01 PROCEDURE — 99222 1ST HOSP IP/OBS MODERATE 55: CPT | Performed by: INTERNAL MEDICINE

## 2023-05-01 PROCEDURE — 2580000003 HC RX 258: Performed by: HOSPITALIST

## 2023-05-01 PROCEDURE — 81001 URINALYSIS AUTO W/SCOPE: CPT

## 2023-05-01 PROCEDURE — 80048 BASIC METABOLIC PNL TOTAL CA: CPT

## 2023-05-01 PROCEDURE — 87086 URINE CULTURE/COLONY COUNT: CPT

## 2023-05-01 PROCEDURE — 6360000002 HC RX W HCPCS: Performed by: HOSPITALIST

## 2023-05-01 PROCEDURE — 6370000000 HC RX 637 (ALT 250 FOR IP): Performed by: HOSPITALIST

## 2023-05-01 PROCEDURE — 6360000002 HC RX W HCPCS: Performed by: INTERNAL MEDICINE

## 2023-05-01 RX ORDER — TORSEMIDE 20 MG/1
10 TABLET ORAL DAILY
Qty: 60 TABLET | Refills: 1
Start: 2023-05-01

## 2023-05-01 RX ORDER — FUROSEMIDE 10 MG/ML
20 INJECTION INTRAMUSCULAR; INTRAVENOUS ONCE
Status: COMPLETED | OUTPATIENT
Start: 2023-05-01 | End: 2023-05-01

## 2023-05-01 RX ADMIN — LEVOTHYROXINE SODIUM 125 MCG: 0.12 TABLET ORAL at 07:05

## 2023-05-01 RX ADMIN — SODIUM CHLORIDE, PRESERVATIVE FREE 10 ML: 5 INJECTION INTRAVENOUS at 09:18

## 2023-05-01 RX ADMIN — CARVEDILOL 25 MG: 25 TABLET, FILM COATED ORAL at 16:04

## 2023-05-01 RX ADMIN — FLUTICASONE PROPIONATE 2 SPRAY: 50 SPRAY, METERED NASAL at 09:24

## 2023-05-01 RX ADMIN — TOPIRAMATE 100 MG: 100 TABLET, FILM COATED ORAL at 09:17

## 2023-05-01 RX ADMIN — MELATONIN TAB 3 MG 3 MG: 3 TAB at 00:54

## 2023-05-01 RX ADMIN — FUROSEMIDE 20 MG: 10 INJECTION, SOLUTION INTRAMUSCULAR; INTRAVENOUS at 16:02

## 2023-05-01 RX ADMIN — ENOXAPARIN SODIUM 30 MG: 100 INJECTION SUBCUTANEOUS at 09:17

## 2023-05-01 RX ADMIN — CARVEDILOL 25 MG: 25 TABLET, FILM COATED ORAL at 07:05

## 2023-05-01 RX ADMIN — CLOPIDOGREL BISULFATE 75 MG: 75 TABLET ORAL at 09:17

## 2023-05-01 NOTE — CONSULTS
Nutrition Education     Consult received for CHF nutrition education. Provided education on low sodium diet guidelines (3-4 gm Na+/day) and fluid restriction (64 oz/day). Reviewed foods to choose and foods to avoid, along with label reading and ways to add flavor to food. Pt does not currently follow a low sodium diet at home, however she does avoid the use of added salt. Pt states understanding of education. Time spent with patient: 10 minutes.        Renetta Castellon RD, LD  Contact Number: [phone]

## 2023-05-01 NOTE — PLAN OF CARE
Problem: Discharge Planning  Goal: Discharge to home or other facility with appropriate resources  Outcome: Progressing  Flowsheets (Taken 4/30/2023 2027)  Discharge to home or other facility with appropriate resources:   Identify barriers to discharge with patient and caregiver   Arrange for needed discharge resources and transportation as appropriate   Identify discharge learning needs (meds, wound care, etc)   Refer to discharge planning if patient needs post-hospital services based on physician order or complex needs related to functional status, cognitive ability or social support system     Problem: Pain  Goal: Verbalizes/displays adequate comfort level or baseline comfort level  Outcome: Progressing  Flowsheets (Taken 4/30/2023 2056)  Verbalizes/displays adequate comfort level or baseline comfort level:   Encourage patient to monitor pain and request assistance   Administer analgesics based on type and severity of pain and evaluate response   Assess pain using appropriate pain scale  Note: No pain at this time. Problem: Safety - Adult  Goal: Free from fall injury  Outcome: Progressing  Flowsheets (Taken 4/30/2023 2056)  Free From Fall Injury: Instruct family/caregiver on patient safety  Note: All fall precautions in place, bed in lowest position, frequent rounding to assist with needs. Pt absent of fall this shift. Problem: Respiratory - Adult  Goal: Achieves optimal ventilation and oxygenation  Outcome: Progressing  Flowsheets (Taken 4/30/2023 2027)  Achieves optimal ventilation and oxygenation:   Assess for changes in respiratory status   Assess for changes in mentation and behavior   Position to facilitate oxygenation and minimize respiratory effort   Oxygen supplementation based on oxygen saturation or arterial blood gases   Respiratory therapy support as indicated  Note: Supplemental O2 in place. RT on board.      Problem: Cardiovascular - Adult  Goal: Maintains optimal cardiac output and

## 2023-05-01 NOTE — CONSULTS
[address]  Advanced CHF/Pulmonary Hypertension   Cardiac Evaluation      [facility]  YOB: 1940    Requesting PHysician:  Dr. April Garcia      Chief Complaint   Patient presents with    Shortness of Breath     Patient states she starting having sob yesterday. Patient states she feels like she cant get a deep breath. Patient denies copd or asthma. History of Present Illness:  Erica Tinoco is an 79 yo female who presents to the ED with difficulty breathing. Symptoms started in the last 1-2 days. She has a history of atrial fibrillation on Xarelto, TIA, chronic kidney disease, chronic combined systolic and diastolic HF, hypertension, hyperlipidemia, mitral valve disease, hypothyroidism. Her last LVEF was 45% on 7/19/19. She had CABG 2008 and PVD. She has a pacemaker and a Watchman. She admits to mild swelling in her ankles. Says she feels as if she cannot get a deep breath. No chest pain. No fever or increased congestion. She says she has been taking all of her meds. She takes torsemide every day except Sunday. When I pointed out to her that the day she came to the hospital was a Sunday and that she needed to take diuretic every day, she was not happy. She says she feels better and wants to go home. She refused lasix today IV. Her BNP was 6000, barely higher than her value on 2/28/23. Troponin minimally elevated. I am consulted for HF management. Labs:  Sodium 141  K 3.8  BUN/cre 38/1,4  H/H 11.5/38.0  Bnp 6017 (was 5492 on 2/28/23)  Troponin 29    Echo:  1/12/23;      Summary   Normal left ventricle size, wall thickness, and systolic function with an   estimated ejection fraction of 55-60%. Left atrial size is dilated. There is a Watchman device seated with a feliciano-device leak measured at 1 mm   on the anterior side. No thrombus seen. Moderate tricuspid regurgitation. CXR:  IMPRESSION:  1. Congestive heart failure.     Meds prior to

## 2023-05-01 NOTE — PROGRESS NOTES
V2.0    [facility] Progress Note      Name:  Meena Rice /Age/Sex: 1940  (33 y.o. female)   MRN & CSN:  [id number] & [id number] Encounter Date/Time: 2023 9:22 AM EDT   Location:  [facility]1152/3005-13 PCP: Thelma Slaughter MD     Attending:Karlee Larkin MD       Hospital Day: 2    Assessment and Recommendations   Meena Rice is a 80 y.o. female with pmh of  who presents with SOB (shortness of breath)      Plan:   Acute CHF exacerbation  -improvement post diuresis. Weight is down  -Continue IV diuresis for now  -Cardiology has been consulted with the recommendation    Chronic kidney disease  -Seems to be stabilizing serum    Urinary incontinence  -Awaiting UA. Currently denies any    COPD  -Breathing is stable. Continue home inhalers        Diet ADULT DIET; Regular; No Added Salt (3-4 gm)   DVT Prophylaxis [] Lovenox, []  Heparin, [] SCDs, [] Ambulation,  [] Eliquis, [] Xarelto   Code Status Full Code   Disposition From: Home  Expected Disposition: Home  Estimated Date of Discharge: Either today or tomorrow depending on cardiology evaluation  Patient requires continued admission due to IV diuresis   Surrogate Decision Maker/ POA         Subjective:     Chief Complaint:     Meena Rice is a 80 y.o. female who presents with shortness of breath. Patient breathing is significantly improved patient feeling less short of breath no orthopnea noted. Patient has not gotten out of the bed but still significantly good diuresis. Review of Systems:      Pertinent positives and negatives discussed in HPI    Objective:      Intake/Output Summary (Last 24 hours) at 2023 0922  Last data filed at 2023 0053  Gross per 24 hour   Intake 120 ml   Output 1200 ml   Net -1080 ml      Vitals:   Vitals:    23 0053 23 0415 23 0706 23 0803   BP: (!) 148/66 (!) 140/57 (!) 152/72    Pulse: 73 83 92    Resp: 16 16 16    Temp: 97.4 °F (36.3 °C) 97.4 °F (36.3 °C) 97.3 °F (36.3 °C)

## 2023-05-01 NOTE — PROGRESS NOTES
Assessment complete. Vitals:    04/30/23 1905   BP: (!) 150/82   Pulse: 72   Resp: 18   Temp: 98 °F (36.7 °C)   SpO2: 96%   No SOB or any distress noted. Pt called out and though she was wet and needed to be changed. Checked pt and found her to be dry. Explained bout the color change of the brief if she was wet. Pt VU. Lung sounds clear with very fine faint crackles in the bases. Pt is diuresing well so will reassess with vitals. POC discussed and agreed upon. Limitation of fluid intake discussed. Pt VU. Call light within reach, pt encouraged to call if any needs arise. No further requests at this time. Will continue to monitor.

## 2023-05-01 NOTE — CARE COORDINATION
the discharge plan with any other family members/significant others, and if so, who? Yes (Daughter)  Plans to Return to Present Housing: Yes  Other Identified Issues/Barriers to RETURNING to current housing: Yes  Potential Assistance needed at discharge: Home Care            Potential DME:    Patient expects to discharge to: [address] [place] [address] for transportation at discharge:      Financial    Payor: [address] [place] [address] [address] / Plan: MEDICARE PART A AND B / Product Type: *No Product type* /     Does insurance require precert for SNF: Yes    Potential assistance Purchasing Medications: No  Meds-to-Beds request:        [facility] [id number] [address] Gent, [place] [address] [phone] Kenan Dolly [phone]  [address] [facility]  Phone: [phone] Fax: [phone]    [facility] Mail [facility] - [facility] [place] [address]  [address] [address]  Phone: [phone] Fax: [phone]      Notes:    Factors facilitating achievement of predicted outcomes: Family support, Motivated, Cooperative, and Pleasant      Additional Case Management Notes: Last admission Pt obtained services through [facility] [employer] [facility]. Pt would like to resume these services. PERICO called Yadiel Buenrostro with [facility] [employer] who advised they can ACCEPT her back and start services. PERICO perfect served Dr. Anselmo Castillo and asked him to place [facility]. Daughter to transport home. The Plan for Transition of Care is related to the following treatment goals of SOB (shortness of breath) [R06.02]  Elevated troponin [R77.8]  Acute decompensated heart failure (HCC) [I50.9]  Chronic kidney disease, unspecified CKD stage [L88.8]    IF APPLICABLE: The Patient and/or patient representative Jason Marcus and her family were provided with a choice of provider and agrees with the discharge plan.  Freedom of choice list with basic dialogue that supports the patient's individualized plan of care/goals and shares

## 2023-05-01 NOTE — PROGRESS NOTES
Data- discharge order received, pt verbalized agreement to discharge, needs for [address] [facility] with [employer], JUANI reviewed and signed by MD, to be completed by RN. Action- AVS prepared, discharge instructions prepared and given to pt with daughter at bedside, medication information packet given r/t NEW or CHANGED prescriptions, pt verbalized understanding further self-review. D/C instruction summary: Diet- Low sodium, Activity- as tolerated, follow up with Primary Care Physician Adrianne Crane [phone] appointment within a week, immunizations reviewed, medications prescriptions reviewed. Contact information provided to above agencies used. CHF education during stay. Response- Case Management/ reported faxing completed JUANI and AVS to needed HHC/DME services stated above. Pt belongings gathered, IV removed, pt dressed with assist of 1 in her clothing. Disposition is home with HHC/DME as stated above, transported with Personal belonings, taken to lobby via w/c with RN, no complications. 1. WEIGHT: Admit Weight: 113 lb (51.3 kg) (04/30/23 1141)        Today  Weight: 111 lb (50.3 kg) (05/01/23 0803)       2.  O2 SAT.: SpO2: 97 % (05/01/23 1645)

## 2023-05-01 NOTE — PROGRESS NOTES
ADVANCED CARE PLANNING    Name:Staci Irving       :  1940              MRN:  [id number]      Purpose of Encounter: Advanced care planning in light of problem listed above   Parties in attendance: :Lety Ferrara MD, Family members:  Decisional Capacity:Yes    Diagnosis: Principal Problem:    SOB (shortness of breath)  Active Problems:    Chronic kidney disease    Acute decompensated heart failure (HCC)    Elevated troponin  Resolved Problems:    * No resolved hospital problems. *    Patients Medical Story:Presented with worsening symptom of dx above. With at risk for life threatening event. Procedure and testing as noted in progress noted. We discussed patient long term goal and also wishes and aggressive care. Discussed in detail about code status and what it means with detailed explanation. Goals of Care Determinations: Patient wishes to focus on full code with aggressive care, CPR, intubation long term vent and facility as well. Plan: Will notify Joy Hector MD of change in care plan. Will look at further interventions as needed. Code Status: At this time patient wishes to be Full Code  Time Spent with Patient: 21 minutes      Electronically signed by Ricarda Brush MD on 2023 at 6:16 PM  Thank you Joy Hector MD for the opportunity to be involved in this patient's care.

## 2023-05-01 NOTE — DISCHARGE INSTR - COC
Continuity of Care Form    Patient Name: Alesia Neal   :  [birth year]  MRN:  [id number]    Admit date:  2023  Discharge date:  2023EE    Code Status Order: Full Code   Advance Directives:     Admitting Physician:  Mattie Howell MD  PCP: Kwabena Gonzalez MD    Discharging Nurse: HERBERTH [facility] Unit/Room#: 0CI-2616/4878-71  Discharging Unit Phone Number: [phone]    Emergency Contact:   Extended Emergency Contact Information  Primary Emergency Contact: Lita Eastman  Address: [place] eman[place] [address] [place], [place] [address]yer of [facility]  Phone: [phone]  Mobile Phone: [phone]  Relation: Child  Secondary Emergency Contact: CYNDI Plas[address] Phone: [phone]  Relation: Child    Past Surgical History:  Past Surgical History:   Procedure Laterality Date    BACK SURGERY      CARDIAC CATHETERIZATION  2012    CARDIAC CATHETERIZATION  2018    Unsuccesful  of RCA    CARDIAC SURGERY      CABG & Cardiac ablation    CHOLECYSTECTOMY      CORONARY ARTERY BYPASS GRAFT  1987    LIMA- Diag/LAD, SVG- RCA    FEMORAL BYPASS Left 2019    LEFT FEMORAL TO POPLITEAL BYPASS GRAFT performed by Lito Senior MD at [facility]. [address] N/A 2019    FEMORAL TO FEMORAL BYPASS performed by Lito Senior MD at [address] Left 2017    Left hip pinning    IR KYPHOPLASTY LUMBAR FIRST LEVEL  2021    IR KYPHOPLASTY LUMBAR FIRST LEVEL 2021 [facility] SPECIAL PROCEDURES    JOINT REPLACEMENT      WA NJX AA&/STRD TFRML EPI LUMBAR/SACRAL 1 LEVEL Right 12/3/2018    RIGHT L3 AND L4 LUMBAR TRANSFORAMINAL EPIRUAL STEROID INJECTION WITH FLUOROSCOPY performed by Abbey Meyers MD at [facility]    PTCA  2014    MARLENY - 3.0 x 28 to the Ist Diag    SHOULDER SURGERY      left       Immunization History:   Immunization History   Administered Date(s) Administered    COVID-19, MODERNA BLUE border, Primary or Immunocompromised, (age

## 2023-05-02 LAB
BACTERIA UR CULT: NORMAL
EKG ATRIAL RATE: 94 BPM
EKG DIAGNOSIS: NORMAL
EKG Q-T INTERVAL: 394 MS
EKG QRS DURATION: 100 MS
EKG QTC CALCULATION (BAZETT): 465 MS
EKG R AXIS: -33 DEGREES
EKG T AXIS: 109 DEGREES
EKG VENTRICULAR RATE: 84 BPM

## 2023-05-02 PROCEDURE — 93010 ELECTROCARDIOGRAM REPORT: CPT | Performed by: INTERNAL MEDICINE

## 2023-05-03 ENCOUNTER — TELEPHONE (OUTPATIENT)
Dept: FAMILY MEDICINE CLINIC | Age: 83
End: 2023-05-03

## 2023-05-03 ENCOUNTER — CLINICAL DOCUMENTATION ONLY (OUTPATIENT)
Facility: CLINIC | Age: 83
End: 2023-05-03

## 2023-05-03 NOTE — TELEPHONE ENCOUNTER
Called Pt, no answer, LVM on home phone and mobile phone. Needs to schedule 30min hospital follow up with Dr Kimberley Lee.

## 2023-05-03 NOTE — TELEPHONE ENCOUNTER
[address]  P [facility] Clinical Staff  TCM has been completed on this patient who was d/c from hospital on  5/01/2023. Final TCM  note has been uploaded to media tab. Patient will be transferred to Longitudinal care management services for any other needs that might arise. NOTE:  Patient has concerns regarding d/c of  nitroGLYCERIN 0.4 MG/SPRAY. Please call patient back and advise if d/c is correct or if patient should continue to use as needed for chest pain.        Thank KIARA ArzolaN, RN   [facility]

## 2023-05-03 NOTE — DISCHARGE SUMMARY
V2.0  Discharge Summary    Name:  Enoch Luciano /Age/Sex: 1940 (89 y.o. female)   Admit Date: 2023  Discharge Date: 5/3/23    MRN & CSN:  [id number] & [id number] Encounter Date and Time 5/3/23 5:50 PM EDT    Attending:  No att. providers found Discharging Provider: Greg Damico MD       Hospital Course:     Brief HPI: Enoch Luciano is a 80 y.o. female who presented with shortness of her hypoxia likely secondary to acute CHF exacerbation with 5 pound weight gain. With pulmonary congestion also acute kidney injury patient treated with IV Lasix. Evaluated by cardiology. Renal function remained stable weight was back to baseline patient was treated with p.o. diuretics on discharge hemodynamically stable on room air    Brief Problem Based Course:   Acute CHF exacerbation  Acute kidney injury  Urinary incontinence UTI ruled out  COPD  CAD      The patient expressed appropriate understanding of, and agreement with the discharge recommendations, medications, and plan.      Consults this admission:  IP CONSULT TO CARDIOLOGY  IP CONSULT TO HEART FAILURE NURSE/COORDINATOR  IP CONSULT TO DIETITIAN  IP CONSULT TO HOME CARE NEEDS    Discharge Diagnosis:   SOB (shortness of breath)        Discharge Instruction:   Follow up appointments:   Primary care physician: Dutch Lesch, MD within 2 weeks  Diet: cardiac diet   Activity: activity as tolerated  Disposition: Discharged to:   [x]Home, []C, []SNF, []Acute Rehab, []Hospice   Condition on discharge: Stable  Labs and Tests to be Followed up as an outpatient by PCP or Specialist:     Discharge Medications:        Medication List        CHANGE how you take these medications      torsemide 20 MG tablet  Commonly known as: DEMADEX  Take 0.5 tablets by mouth daily  What changed:   how much to take  how to take this  when to take this  additional instructions  Notes to patient: USE: \"water pill\" reduces swelling, fluid accumulation, blood pressure    Side

## 2023-05-05 ENCOUNTER — OFFICE VISIT (OUTPATIENT)
Dept: PULMONOLOGY | Age: 83
End: 2023-05-05
Payer: MEDICARE

## 2023-05-05 VITALS
WEIGHT: 113 LBS | SYSTOLIC BLOOD PRESSURE: 127 MMHG | HEART RATE: 70 BPM | OXYGEN SATURATION: 98 % | BODY MASS INDEX: 20.02 KG/M2 | DIASTOLIC BLOOD PRESSURE: 67 MMHG

## 2023-05-05 DIAGNOSIS — I48.0 PAROXYSMAL ATRIAL FIBRILLATION (HCC): ICD-10-CM

## 2023-05-05 DIAGNOSIS — J44.9 COPD, MODERATE (HCC): Primary | ICD-10-CM

## 2023-05-05 DIAGNOSIS — Z87.891 FORMER SMOKER: ICD-10-CM

## 2023-05-05 DIAGNOSIS — I50.32 CHRONIC DIASTOLIC HEART FAILURE (HCC): ICD-10-CM

## 2023-05-05 PROCEDURE — 1036F TOBACCO NON-USER: CPT | Performed by: INTERNAL MEDICINE

## 2023-05-05 PROCEDURE — G8399 PT W/DXA RESULTS DOCUMENT: HCPCS | Performed by: INTERNAL MEDICINE

## 2023-05-05 PROCEDURE — 1090F PRES/ABSN URINE INCON ASSESS: CPT | Performed by: INTERNAL MEDICINE

## 2023-05-05 PROCEDURE — G8427 DOCREV CUR MEDS BY ELIG CLIN: HCPCS | Performed by: INTERNAL MEDICINE

## 2023-05-05 PROCEDURE — G8420 CALC BMI NORM PARAMETERS: HCPCS | Performed by: INTERNAL MEDICINE

## 2023-05-05 PROCEDURE — 1123F ACP DISCUSS/DSCN MKR DOCD: CPT | Performed by: INTERNAL MEDICINE

## 2023-05-05 PROCEDURE — 99214 OFFICE O/P EST MOD 30 MIN: CPT | Performed by: INTERNAL MEDICINE

## 2023-05-05 PROCEDURE — 3078F DIAST BP <80 MM HG: CPT | Performed by: INTERNAL MEDICINE

## 2023-05-05 PROCEDURE — 3023F SPIROM DOC REV: CPT | Performed by: INTERNAL MEDICINE

## 2023-05-05 PROCEDURE — 3074F SYST BP LT 130 MM HG: CPT | Performed by: INTERNAL MEDICINE

## 2023-05-05 PROCEDURE — 1111F DSCHRG MED/CURRENT MED MERGE: CPT | Performed by: INTERNAL MEDICINE

## 2023-05-05 NOTE — PROGRESS NOTES
PULMONARY OFFICE FOLLOW UP NOTE    REASON FOR VISIT:   Chief Complaint   Patient presents with    Follow-up       DATE OF VISIT: 2023    HISTORY OF PRESENT ILLNESS: 80y.o. year old female  is here for follow up of COPD. She has PMH of atrial fibrillation on Xarelto s/p Watchman procedure, heart failure with ejection fraction, coronary disease s/p CABG, hypertension, hyperlipidemia, CKD stage IIIa     Patient continues to have off-and-on dyspnea on exertion. She denies any cough or wheezing or chest pain or hemoptysis. Patient has 28-pack-year smoking history. She quit smoking in . Patient has heart failure with preserved ejection fraction as well as atrial fibrillation. She follows up with cardiology    Patient was hospitalized in 2023 for pneumonia. At that time it was noted that she had bilateral patchy groundglass infiltrates on CT chest.  Patient used to see pulmonologist in the past for COPD. She had obstructive disease noted on PFT from  with FEV1 57%. Her latest PFT from 2018 showed improvement in FEV1 but did show reduced diffusion capacity of 47%    DIAGNOSTIC TEST REVIEWED:  I reviewed the PFT from 2023 and my interpretation is as follows. Moderate obstructive airway disease. FEV1/FVC decreased  FEV1 65%, 1.16 L  FVC 72%, 1.74 L  TLC 77%, 3.46 L  DLCO 89%    I reviewed the PFT from 2018 and my interpretation is as follows. Mild obstructive airway disease with reduced diffusion capacity. FEV1/FVC 70  FEV1 80%, 1.55 L  FVC 84%, 2.20 L  TLC 82%, 3.72 L  DLCO 47%     I reviewed the PFT from  and my interpretation is as follows. Moderate obstructive airway disease with reduced diffusion capacity. FeV1/FVC: 64  FeV1: 57%, 1.18 Liters  FVC: 68%, 1.84 Liters  T%, 3.62 Liters  DLCO: 55%      REVIEW OF SYSTEMS:   CONSTITUTIONAL SYMPTOMS: The patient denies fever, fatigue, night sweats, weight loss or weight gain. HEENT: No vision changes.  No tinnitus,

## 2023-05-09 ENCOUNTER — OFFICE VISIT (OUTPATIENT)
Dept: CARDIOLOGY CLINIC | Age: 83
End: 2023-05-09

## 2023-05-09 VITALS
WEIGHT: 111 LBS | SYSTOLIC BLOOD PRESSURE: 122 MMHG | OXYGEN SATURATION: 100 % | HEIGHT: 63 IN | DIASTOLIC BLOOD PRESSURE: 56 MMHG | BODY MASS INDEX: 19.67 KG/M2 | HEART RATE: 62 BPM

## 2023-05-09 DIAGNOSIS — I25.83 CORONARY ARTERY DISEASE DUE TO LIPID RICH PLAQUE: ICD-10-CM

## 2023-05-09 DIAGNOSIS — N28.9 RENAL INSUFFICIENCY: ICD-10-CM

## 2023-05-09 DIAGNOSIS — I25.10 CORONARY ARTERY DISEASE DUE TO LIPID RICH PLAQUE: ICD-10-CM

## 2023-05-09 DIAGNOSIS — I48.20 CHRONIC ATRIAL FIBRILLATION (HCC): ICD-10-CM

## 2023-05-09 DIAGNOSIS — I50.22 CHRONIC SYSTOLIC HEART FAILURE (HCC): Primary | ICD-10-CM

## 2023-05-09 NOTE — PATIENT INSTRUCTIONS
Continue all current medications  It is okay to take an extra 20 mg of torsemide if ankle edema or shortness of breath  RTO on June 6 th at 115 pm

## 2023-05-09 NOTE — PROGRESS NOTES
OM1 bifurcation, 30% OM1 mid at bifurcation   RI NA   % prox with multiple R-R collaterals   LVEDP 8   LVG NA due to renal failure      Intervention:         None     Post Cath Dx:       Stable CAD  Possible angina from  of RCA - poor candidate for  intervention with renal failure    Echo 4/30/2020   Left ventricular cavity size is normal. Normal left ventricular wall   thickness. Left ventricular function appears to be reduced with an estimated   ejection fraction of 45%. Diffuse hypokinesis. Echo 7/19/2019   Summary    Left ventricle - normal size, thickness, reduced function with EF of 45%  LV wall motion - diffuse hypokinesis. Mitral valve - thickened, annular calcification, moderate regurgitation  Aortic valve - thickened, calcified, mild regurgitation  Tricuspid valve - mild regurgitation with PASP of 25mmHg  Pulmonic valve - trivial regurgitation   Biatrial enlargement  Pacemaker / ICD lead is visualized in the right heart. 6/2019 Dr Vera Ramirez  NSTEMI: . Angiogram findings were as below:      Findings:                      LM       Normal              LAD     50% ostial, mid 100%              Cx        40% OM1 at bifurcation              RCA     Mid 100%              LVG     Not performed              EDP     15 mmHg              L-LAD  Patent              S-PDA Known occluded  Severe Ca++:None  Post Cath Dx:Stable CAD, no apparent culprit for NSTEMI  Intervention:  None  Med Rec:        Continue aggressive med tx                          Continue plavix, no asa due to allergy. statin added. LDL 75   8/7/18  The PCI of complex proximal RCA chronic total occlusion was unsuccessful (J- score 3). Underwent successful Angioplasty of high-grade proximal RCA lesion proximal to the . RECOMMENDATIONS:  Attempt on this complex long  was unsuccessful.  Lack   Of retrograde collaterals along with a very ambiguous cap as well as a large RV marginal branch and bridging collateral coming off at

## 2023-05-31 PROBLEM — R79.89 ELEVATED TROPONIN: Status: RESOLVED | Noted: 2023-05-01 | Resolved: 2023-05-31

## 2023-05-31 PROBLEM — R77.8 ELEVATED TROPONIN: Status: RESOLVED | Noted: 2023-05-01 | Resolved: 2023-05-31

## 2023-06-01 DIAGNOSIS — I12.9 BENIGN HYPERTENSION WITH CHRONIC KIDNEY DISEASE, STAGE III (HCC): ICD-10-CM

## 2023-06-01 DIAGNOSIS — E87.0 HYPERNATREMIA: ICD-10-CM

## 2023-06-01 DIAGNOSIS — N18.30 BENIGN HYPERTENSION WITH CHRONIC KIDNEY DISEASE, STAGE III (HCC): ICD-10-CM

## 2023-06-01 DIAGNOSIS — I50.22 CHRONIC SYSTOLIC HEART FAILURE (HCC): ICD-10-CM

## 2023-06-01 DIAGNOSIS — N17.9 ACUTE KIDNEY INJURY SUPERIMPOSED ON CHRONIC KIDNEY DISEASE (HCC): ICD-10-CM

## 2023-06-01 DIAGNOSIS — I50.22 CHRONIC SYSTOLIC CONGESTIVE HEART FAILURE (HCC): ICD-10-CM

## 2023-06-01 DIAGNOSIS — N18.32 STAGE 3B CHRONIC KIDNEY DISEASE (HCC): ICD-10-CM

## 2023-06-01 DIAGNOSIS — N18.9 ACUTE KIDNEY INJURY SUPERIMPOSED ON CHRONIC KIDNEY DISEASE (HCC): ICD-10-CM

## 2023-06-01 LAB
25(OH)D3 SERPL-MCNC: 52.9 NG/ML
CREAT UR-MCNC: 31 MG/DL (ref 28–259)
DEPRECATED RDW RBC AUTO: 22 % (ref 12.4–15.4)
HCT VFR BLD AUTO: 39.1 % (ref 36–48)
HGB BLD-MCNC: 12 G/DL (ref 12–16)
MCH RBC QN AUTO: 26 PG (ref 26–34)
MCHC RBC AUTO-ENTMCNC: 30.7 G/DL (ref 31–36)
MCV RBC AUTO: 84.8 FL (ref 80–100)
PLATELET # BLD AUTO: 193 K/UL (ref 135–450)
PMV BLD AUTO: 9.2 FL (ref 5–10.5)
PROT UR-MCNC: <4 MG/DL
PROT/CREAT UR-RTO: NORMAL MG/DL
PTH-INTACT SERPL-MCNC: 57.2 PG/ML (ref 14–72)
RBC # BLD AUTO: 4.6 M/UL (ref 4–5.2)
WBC # BLD AUTO: 3.4 K/UL (ref 4–11)

## 2023-06-02 LAB
ALBUMIN SERPL-MCNC: 3.9 G/DL (ref 3.4–5)
ANION GAP SERPL CALCULATED.3IONS-SCNC: 12 MMOL/L (ref 3–16)
BUN SERPL-MCNC: 38 MG/DL (ref 7–20)
CALCIUM SERPL-MCNC: 8.9 MG/DL (ref 8.3–10.6)
CHLORIDE SERPL-SCNC: 112 MMOL/L (ref 99–110)
CO2 SERPL-SCNC: 21 MMOL/L (ref 21–32)
CREAT SERPL-MCNC: 1.6 MG/DL (ref 0.6–1.2)
GFR SERPLBLD CREATININE-BSD FMLA CKD-EPI: 32 ML/MIN/{1.73_M2}
GLUCOSE SERPL-MCNC: 92 MG/DL (ref 70–99)
NT-PROBNP SERPL-MCNC: 5673 PG/ML (ref 0–449)
PHOSPHATE SERPL-MCNC: 4.6 MG/DL (ref 2.5–4.9)
POTASSIUM SERPL-SCNC: 4.7 MMOL/L (ref 3.5–5.1)
SODIUM SERPL-SCNC: 145 MMOL/L (ref 136–145)

## 2023-06-05 ENCOUNTER — TELEPHONE (OUTPATIENT)
Dept: FAMILY MEDICINE CLINIC | Age: 83
End: 2023-06-05

## 2023-06-05 NOTE — TELEPHONE ENCOUNTER
Sid Claude from PT called to let us know she discharged the patient today with all goals met and she is doing much better

## 2023-06-06 ENCOUNTER — OFFICE VISIT (OUTPATIENT)
Dept: CARDIOLOGY CLINIC | Age: 83
End: 2023-06-06
Payer: MEDICARE

## 2023-06-06 VITALS
DIASTOLIC BLOOD PRESSURE: 60 MMHG | BODY MASS INDEX: 19.31 KG/M2 | HEIGHT: 63 IN | HEART RATE: 71 BPM | SYSTOLIC BLOOD PRESSURE: 100 MMHG | OXYGEN SATURATION: 97 % | WEIGHT: 109 LBS

## 2023-06-06 DIAGNOSIS — I50.22 CHRONIC SYSTOLIC HEART FAILURE (HCC): ICD-10-CM

## 2023-06-06 DIAGNOSIS — N28.9 RENAL INSUFFICIENCY: ICD-10-CM

## 2023-06-06 DIAGNOSIS — I50.22 CHRONIC SYSTOLIC HEART FAILURE (HCC): Primary | ICD-10-CM

## 2023-06-06 DIAGNOSIS — I48.20 CHRONIC ATRIAL FIBRILLATION (HCC): ICD-10-CM

## 2023-06-06 DIAGNOSIS — I25.10 CORONARY ARTERY DISEASE DUE TO LIPID RICH PLAQUE: ICD-10-CM

## 2023-06-06 DIAGNOSIS — I25.83 CORONARY ARTERY DISEASE DUE TO LIPID RICH PLAQUE: ICD-10-CM

## 2023-06-06 LAB
T3 SERPL-MCNC: 0.82 NG/ML (ref 0.8–2)
T4 FREE SERPL-MCNC: 1.7 NG/DL (ref 0.9–1.8)
TSH SERPL DL<=0.005 MIU/L-ACNC: 0.09 UIU/ML (ref 0.27–4.2)

## 2023-06-06 PROCEDURE — G8420 CALC BMI NORM PARAMETERS: HCPCS | Performed by: CLINICAL NURSE SPECIALIST

## 2023-06-06 PROCEDURE — 1036F TOBACCO NON-USER: CPT | Performed by: CLINICAL NURSE SPECIALIST

## 2023-06-06 PROCEDURE — 3074F SYST BP LT 130 MM HG: CPT | Performed by: CLINICAL NURSE SPECIALIST

## 2023-06-06 PROCEDURE — G8399 PT W/DXA RESULTS DOCUMENT: HCPCS | Performed by: CLINICAL NURSE SPECIALIST

## 2023-06-06 PROCEDURE — 99214 OFFICE O/P EST MOD 30 MIN: CPT | Performed by: CLINICAL NURSE SPECIALIST

## 2023-06-06 PROCEDURE — 1090F PRES/ABSN URINE INCON ASSESS: CPT | Performed by: CLINICAL NURSE SPECIALIST

## 2023-06-06 PROCEDURE — G8427 DOCREV CUR MEDS BY ELIG CLIN: HCPCS | Performed by: CLINICAL NURSE SPECIALIST

## 2023-06-06 PROCEDURE — 3078F DIAST BP <80 MM HG: CPT | Performed by: CLINICAL NURSE SPECIALIST

## 2023-06-06 PROCEDURE — 1123F ACP DISCUSS/DSCN MKR DOCD: CPT | Performed by: CLINICAL NURSE SPECIALIST

## 2023-06-06 NOTE — PROGRESS NOTES
Sig Start Date End Date Taking? Authorizing Provider   torsemide (DEMADEX) 20 MG tablet Take 0.5 tablets by mouth daily 5/1/23  Yes Angelica Yang MD   allopurinol (ZYLOPRIM) 100 MG tablet TAKE 1 TABLET EVERY DAY 3/15/23  Yes Kallie Yu MD   levothyroxine (SYNTHROID) 125 MCG tablet TAKE 1 TABLET EVERY DAY 2/28/23  Yes Kallie Yu MD   vitamin D (ERGOCALCIFEROL) 1.25 MG (27908 UT) CAPS capsule TAKE 1 CAPSULE ONE TIME WEEKLY 2/22/23  Yes Kallie Yu MD   albuterol sulfate HFA (PROVENTIL HFA) 108 (90 Base) MCG/ACT inhaler Inhale 2 puffs into the lungs every 4 hours as needed for Wheezing or Shortness of Breath (Space out to every 6 hours as symptoms improve) Space out to every 6 hours as symptoms improve. 2/9/23  Yes Martín Smith PA-C   clopidogrel (PLAVIX) 75 MG tablet Take 1 tablet by mouth daily 11/25/22  Yes Bernice Manzo MD   carvedilol (COREG) 25 MG tablet Take 1 tablet by mouth 2 times daily (with meals) 11/7/22  Yes TIFFANIE Jones CNP   rosuvastatin (CRESTOR) 5 MG tablet Take 1 tablet by mouth nightly 11/7/22  Yes TIFFANIE Jones - CNP   aspirin EC 81 MG EC tablet Take 1 tablet by mouth daily 10/17/22  Yes Rocio Bruno MD   topiramate (TOPAMAX) 50 MG tablet TAKE 2 TABLETS TWICE DAILY 10/10/22  Yes Kallie Yu MD   fluticasone Ardia Mais) 50 MCG/ACT nasal spray 2 sprays by Each Nostril route daily 1/25/22  Yes TIFFANIE Gaytan CNP        Allergies:  Aspirin, Diltiazem, Diltiazem hcl, Lorazepam, Sulfa antibiotics, Atorvastatin, Dabigatran, Mysoline [primidone], Nsaids, Other, and Primidone     Review of Systems:   Constitutional: there has been no unanticipated weight loss. There's been no change in energy level, sleep pattern, or activity level. Eyes: No visual changes or diplopia. No scleral icterus. ENT: No Headaches, hearing loss or vertigo. No mouth sores or sore throat.   Cardiovascular: Reviewed in HPI  Respiratory: No cough or wheezing, no

## 2023-06-06 NOTE — PATIENT INSTRUCTIONS
Continue all current medications  Will add TSH to blood in lab  Call and make an appt with Dr James Raymundo miss take any of your medications  RTO in 3-4 months

## 2023-06-07 ENCOUNTER — TELEPHONE (OUTPATIENT)
Dept: CARDIOLOGY CLINIC | Age: 83
End: 2023-06-07

## 2023-06-07 NOTE — TELEPHONE ENCOUNTER
----- Message from Annia Olszewski, APRN - CNS sent at 6/7/2023  9:16 AM EDT -----  Please let her know that Dr Cuong Garcia will adjust her synthroid at her appt    Spoke to patient's daughter she verbalized understanding.

## 2023-06-13 PROBLEM — Q87.89: Status: ACTIVE | Noted: 2023-06-13

## 2023-06-13 PROBLEM — Q87.89: Status: RESOLVED | Noted: 2023-06-13 | Resolved: 2023-06-13

## 2023-06-23 ENCOUNTER — TELEPHONE (OUTPATIENT)
Dept: FAMILY MEDICINE CLINIC | Age: 83
End: 2023-06-23

## 2023-06-23 NOTE — TELEPHONE ENCOUNTER
Pts thyroid med was adjusted recently, was given order to recheck TSH, but is unclear as to when she should get it rechecked.

## 2023-07-17 DIAGNOSIS — M15.9 PRIMARY OSTEOARTHRITIS INVOLVING MULTIPLE JOINTS: ICD-10-CM

## 2023-07-17 RX ORDER — HYDROCODONE BITARTRATE AND ACETAMINOPHEN 5; 325 MG/1; MG/1
1 TABLET ORAL 4 TIMES DAILY PRN
Qty: 120 TABLET | Refills: 0 | Status: SHIPPED | OUTPATIENT
Start: 2023-07-17 | End: 2023-08-16

## 2023-07-17 NOTE — TELEPHONE ENCOUNTER
Medication:   Requested Prescriptions     Pending Prescriptions Disp Refills    HYDROcodone-acetaminophen (NORCO) 5-325 MG per tablet 120 tablet 0     Sig: Take 1 tablet by mouth 4 times daily as needed for Pain for up to 30 days. Last Filled: 6/13    Patient Phone Number: [phone] (home)     Last appt: 6/13/2023   Next appt: 8/4/2023    Last OARRS:   RX Monitoring 6/1/2021   Attestation -   Periodic Controlled Substance Monitoring Possible medication side effects, risk of tolerance/dependence & alternative treatments discussed.      PDMP Monitoring:    Last PDMP Yocasta Becerril as Reviewed [facility]):  Review User Review Instant Review Result   Gilmer HANNA 4/21/2022  4:20 PM Reviewed PDMP [1]     Preferred Pharmacy:   [facility] [id number] - [id number], [facility],[address] [phone] [facility] [phone] [address]  [place] [address]  Phone: [phone] Fax: [phone]    [facility] Mail Delivery - [place] [phone] - F [phone] [address] [facility] [address]  Phone: [phone] Fax: [phone]

## 2023-07-25 ENCOUNTER — OFFICE VISIT (OUTPATIENT)
Dept: ENT CLINIC | Age: 83
End: 2023-07-25
Payer: MEDICARE

## 2023-07-25 VITALS
DIASTOLIC BLOOD PRESSURE: 72 MMHG | SYSTOLIC BLOOD PRESSURE: 144 MMHG | OXYGEN SATURATION: 98 % | WEIGHT: 106 LBS | BODY MASS INDEX: 18.78 KG/M2 | TEMPERATURE: 97.5 F | HEIGHT: 63 IN | HEART RATE: 70 BPM

## 2023-07-25 DIAGNOSIS — R49.0 DYSPHONIA: Primary | ICD-10-CM

## 2023-07-25 DIAGNOSIS — J30.0 VASOMOTOR RHINITIS: ICD-10-CM

## 2023-07-25 DIAGNOSIS — R22.1 NECK MASS: ICD-10-CM

## 2023-07-25 PROCEDURE — 31575 DIAGNOSTIC LARYNGOSCOPY: CPT | Performed by: STUDENT IN AN ORGANIZED HEALTH CARE EDUCATION/TRAINING PROGRAM

## 2023-07-25 PROCEDURE — 1123F ACP DISCUSS/DSCN MKR DOCD: CPT | Performed by: STUDENT IN AN ORGANIZED HEALTH CARE EDUCATION/TRAINING PROGRAM

## 2023-07-25 PROCEDURE — 1036F TOBACCO NON-USER: CPT | Performed by: STUDENT IN AN ORGANIZED HEALTH CARE EDUCATION/TRAINING PROGRAM

## 2023-07-25 PROCEDURE — G8420 CALC BMI NORM PARAMETERS: HCPCS | Performed by: STUDENT IN AN ORGANIZED HEALTH CARE EDUCATION/TRAINING PROGRAM

## 2023-07-25 PROCEDURE — 99204 OFFICE O/P NEW MOD 45 MIN: CPT | Performed by: STUDENT IN AN ORGANIZED HEALTH CARE EDUCATION/TRAINING PROGRAM

## 2023-07-25 PROCEDURE — 1090F PRES/ABSN URINE INCON ASSESS: CPT | Performed by: STUDENT IN AN ORGANIZED HEALTH CARE EDUCATION/TRAINING PROGRAM

## 2023-07-25 PROCEDURE — G8399 PT W/DXA RESULTS DOCUMENT: HCPCS | Performed by: STUDENT IN AN ORGANIZED HEALTH CARE EDUCATION/TRAINING PROGRAM

## 2023-07-25 PROCEDURE — 3077F SYST BP >= 140 MM HG: CPT | Performed by: STUDENT IN AN ORGANIZED HEALTH CARE EDUCATION/TRAINING PROGRAM

## 2023-07-25 PROCEDURE — 3078F DIAST BP <80 MM HG: CPT | Performed by: STUDENT IN AN ORGANIZED HEALTH CARE EDUCATION/TRAINING PROGRAM

## 2023-07-25 PROCEDURE — G8427 DOCREV CUR MEDS BY ELIG CLIN: HCPCS | Performed by: STUDENT IN AN ORGANIZED HEALTH CARE EDUCATION/TRAINING PROGRAM

## 2023-07-25 RX ORDER — IPRATROPIUM BROMIDE 42 UG/1
2 SPRAY, METERED NASAL 3 TIMES DAILY PRN
Qty: 1 EACH | Refills: 3 | Status: SHIPPED | OUTPATIENT
Start: 2023-07-25

## 2023-07-25 NOTE — PROGRESS NOTES
Cigarettes     Quit date: 1987     Years since quittin.5    Smokeless tobacco: Never    Tobacco comments:     H.O.smoking at age 15 / smoked up to 1 p.p.d / quit    Vaping Use    Vaping Use: Never used   Substance Use Topics    Alcohol use: Yes     Comment: social    Drug use: No        Allergies     Allergies   Allergen Reactions    Aspirin Nausea Only    Diltiazem Anaphylaxis    Diltiazem Hcl Anaphylaxis    Lorazepam Other (See Comments)     hallucinations  hallucinations  hallucinations    Sulfa Antibiotics Rash and Hives    Atorvastatin Other (See Comments)     Muscle pains  Muscle pains  Muscle pains    Dabigatran Nausea Only     And indigestion  And indigestion    Aka Pradaxa  And indigestion  And indigestion  And indigestion    Aka Pradaxa  And indigestion  And indigestion  And indigestion    Aka Pradaxa    Mysoline [Primidone]     Nsaids      Other reaction(s): Unknown    Other Other (See Comments)     Nitroglycerin patches causes severe headaches    Primidone      Other reaction(s): Unknown       Medications     Current Outpatient Medications   Medication Sig Dispense Refill    ipratropium (ATROVENT) 0.06 % nasal spray 2 sprays by Each Nostril route 3 times daily as needed for Rhinitis ((runny nose)) 1 each 3    HYDROcodone-acetaminophen (NORCO) 5-325 MG per tablet Take 1 tablet by mouth 4 times daily as needed for Pain for up to 30 days.  120 tablet 0    rosuvastatin (CRESTOR) 5 MG tablet TAKE 1 TABLET EVERY NIGHT 90 tablet 1    levothyroxine (SYNTHROID) 112 MCG tablet Take 1 tablet by mouth daily 90 tablet 1    torsemide (DEMADEX) 20 MG tablet Take 0.5 tablets by mouth daily 60 tablet 1    allopurinol (ZYLOPRIM) 100 MG tablet TAKE 1 TABLET EVERY DAY 90 tablet 1    vitamin D (ERGOCALCIFEROL) 1.25 MG (70228 UT) CAPS capsule TAKE 1 CAPSULE ONE TIME WEEKLY 12 capsule 0    albuterol sulfate HFA (PROVENTIL HFA) 108 (90 Base) MCG/ACT inhaler Inhale 2 puffs into the lungs every 4 hours as needed for

## 2023-08-01 ENCOUNTER — APPOINTMENT (RX ONLY)
Dept: URBAN - METROPOLITAN AREA CLINIC 170 | Facility: CLINIC | Age: 83
Setting detail: DERMATOLOGY
End: 2023-08-01

## 2023-08-01 DIAGNOSIS — L82.1 OTHER SEBORRHEIC KERATOSIS: ICD-10-CM

## 2023-08-01 DIAGNOSIS — L29.8 OTHER PRURITUS: ICD-10-CM | Status: WELL CONTROLLED

## 2023-08-01 DIAGNOSIS — L29.89 OTHER PRURITUS: ICD-10-CM | Status: WELL CONTROLLED

## 2023-08-01 PROCEDURE — ? COUNSELING

## 2023-08-01 PROCEDURE — ? PRESCRIPTION MEDICATION MANAGEMENT

## 2023-08-01 PROCEDURE — ? PRESCRIPTION SAMPLES PROVIDED

## 2023-08-01 PROCEDURE — 99213 OFFICE O/P EST LOW 20 MIN: CPT

## 2023-08-01 PROCEDURE — ? PRESCRIPTION

## 2023-08-01 PROCEDURE — ? EDUCATIONAL RESOURCES PROVIDED

## 2023-08-01 RX ORDER — CLOBETASOL PROPIONATE 0.5 MG/ML
SOLUTION TOPICAL
Qty: 50 | Refills: 11 | Status: ERX

## 2023-08-01 ASSESSMENT — LOCATION DETAILED DESCRIPTION DERM
LOCATION DETAILED: INFERIOR THORACIC SPINE
LOCATION DETAILED: EPIGASTRIC SKIN

## 2023-08-01 ASSESSMENT — LOCATION SIMPLE DESCRIPTION DERM
LOCATION SIMPLE: ABDOMEN
LOCATION SIMPLE: UPPER BACK

## 2023-08-01 ASSESSMENT — LOCATION ZONE DERM: LOCATION ZONE: TRUNK

## 2023-08-04 ENCOUNTER — OFFICE VISIT (OUTPATIENT)
Dept: FAMILY MEDICINE CLINIC | Age: 83
End: 2023-08-04

## 2023-08-04 VITALS
TEMPERATURE: 99 F | DIASTOLIC BLOOD PRESSURE: 68 MMHG | WEIGHT: 111.5 LBS | BODY MASS INDEX: 19.75 KG/M2 | RESPIRATION RATE: 12 BRPM | OXYGEN SATURATION: 99 % | SYSTOLIC BLOOD PRESSURE: 134 MMHG | HEART RATE: 70 BPM

## 2023-08-04 DIAGNOSIS — N18.32 CHRONIC RENAL FAILURE, STAGE 3B (HCC): ICD-10-CM

## 2023-08-04 DIAGNOSIS — R49.0 DYSPHONIA: Primary | ICD-10-CM

## 2023-08-04 DIAGNOSIS — I50.42 CHRONIC COMBINED SYSTOLIC (CONGESTIVE) AND DIASTOLIC (CONGESTIVE) HEART FAILURE (HCC): ICD-10-CM

## 2023-08-04 DIAGNOSIS — R41.3 MEMORY LOSS DUE TO MEDICAL CONDITION: ICD-10-CM

## 2023-08-04 DIAGNOSIS — M15.9 PRIMARY OSTEOARTHRITIS INVOLVING MULTIPLE JOINTS: ICD-10-CM

## 2023-08-04 PROBLEM — I50.9 ACUTE DECOMPENSATED HEART FAILURE (HCC): Status: RESOLVED | Noted: 2020-03-25 | Resolved: 2023-08-04

## 2023-08-04 SDOH — ECONOMIC STABILITY: FOOD INSECURITY: WITHIN THE PAST 12 MONTHS, YOU WORRIED THAT YOUR FOOD WOULD RUN OUT BEFORE YOU GOT MONEY TO BUY MORE.: NEVER TRUE

## 2023-08-04 SDOH — ECONOMIC STABILITY: INCOME INSECURITY: HOW HARD IS IT FOR YOU TO PAY FOR THE VERY BASICS LIKE FOOD, HOUSING, MEDICAL CARE, AND HEATING?: NOT HARD AT ALL

## 2023-08-04 SDOH — ECONOMIC STABILITY: FOOD INSECURITY: WITHIN THE PAST 12 MONTHS, THE FOOD YOU BOUGHT JUST DIDN'T LAST AND YOU DIDN'T HAVE MONEY TO GET MORE.: NEVER TRUE

## 2023-08-04 NOTE — PROGRESS NOTES
Iron deficiency anemia    Bilateral sensorineural hearing loss    Memory loss due to medical condition    Symptomatic bradycardia    Pacemaker lead failure    Presence of Watchman left atrial appendage closure device    Chronic right sacroiliac pain    Postcholecystectomy diarrhea    Chronic renal failure, stage 3b (HCC)    Coronary artery disease involving native heart without angina pectoris, unspecified vessel or lesion type    Hoarseness, persistent    Protein calorie malnutrition    Former smoker    SOB (shortness of breath)       Outpatient Medications Marked as Taking for the 8/4/23 encounter (Office Visit) with Manda Tomlinson MD   Medication Sig Dispense Refill    ipratropium (ATROVENT) 0.06 % nasal spray 2 sprays by Each Nostril route 3 times daily as needed for Rhinitis ((runny nose)) 1 each 3    HYDROcodone-acetaminophen (NORCO) 5-325 MG per tablet Take 1 tablet by mouth 4 times daily as needed for Pain for up to 30 days. 120 tablet 0    rosuvastatin (CRESTOR) 5 MG tablet TAKE 1 TABLET EVERY NIGHT 90 tablet 1    levothyroxine (SYNTHROID) 112 MCG tablet Take 1 tablet by mouth daily 90 tablet 1    torsemide (DEMADEX) 20 MG tablet Take 0.5 tablets by mouth daily 60 tablet 1    allopurinol (ZYLOPRIM) 100 MG tablet TAKE 1 TABLET EVERY DAY 90 tablet 1    vitamin D (ERGOCALCIFEROL) 1.25 MG (83070 UT) CAPS capsule TAKE 1 CAPSULE ONE TIME WEEKLY 12 capsule 0    albuterol sulfate HFA (PROVENTIL HFA) 108 (90 Base) MCG/ACT inhaler Inhale 2 puffs into the lungs every 4 hours as needed for Wheezing or Shortness of Breath (Space out to every 6 hours as symptoms improve) Space out to every 6 hours as symptoms improve.  18 g 0    clopidogrel (PLAVIX) 75 MG tablet Take 1 tablet by mouth daily 90 tablet 2    carvedilol (COREG) 25 MG tablet Take 1 tablet by mouth 2 times daily (with meals) 180 tablet 3    aspirin EC 81 MG EC tablet Take 1 tablet by mouth daily 90 tablet 1    topiramate (TOPAMAX) 50 MG tablet TAKE 2

## 2023-08-07 RX ORDER — TOPIRAMATE 50 MG/1
TABLET, FILM COATED ORAL
Qty: 360 TABLET | Refills: 0 | OUTPATIENT
Start: 2023-08-07

## 2023-08-07 RX ORDER — TORSEMIDE 20 MG/1
10 TABLET ORAL DAILY
Qty: 60 TABLET | Refills: 0 | Status: SHIPPED | OUTPATIENT
Start: 2023-08-07

## 2023-08-07 RX ORDER — TOPIRAMATE 50 MG/1
TABLET, FILM COATED ORAL
Qty: 360 TABLET | Refills: 0 | Status: SHIPPED | OUTPATIENT
Start: 2023-08-07

## 2023-08-07 RX ORDER — TORSEMIDE 20 MG/1
TABLET ORAL
Qty: 38 TABLET | OUTPATIENT
Start: 2023-08-07

## 2023-08-07 NOTE — TELEPHONE ENCOUNTER
For this these?  The  topiramate was last sent on 10/10/22 with no refill and no furosemide on current list. Please advise

## 2023-08-07 NOTE — TELEPHONE ENCOUNTER
topiramate (TOPAMAX) tablet 100 mg       furosemide (LASIX) injection 20 mg         [facility] [id number] Karly Haywood, [address] [facility] [phone] - F [phone]

## 2023-08-11 ENCOUNTER — HOSPITAL ENCOUNTER (OUTPATIENT)
Dept: SPEECH THERAPY | Age: 83
Setting detail: THERAPIES SERIES
Discharge: HOME | End: 2023-08-11
Attending: FAMILY MEDICINE
Payer: MEDICARE

## 2023-08-11 PROCEDURE — 92524 BEHAVRAL QUALIT ANALYS VOICE: CPT

## 2023-08-11 NOTE — PLAN OF CARE
within normal limits. No neoplastic or ulcerative masses. Patient reassured. 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 Offered referral to SLP but she would like to hold off on this for now. \".     ONSET: 2021    Recent MRI Brain/Head CT: None in past year    History/Prior Level of Function: Pt reports people cannot understand her or hear her on the phone, states \"this is not my real voice\". Feels her voice has gotten worse over time but does not fluctuate much during the day or from day to day. Does not feel her voice problems are due to age, denies reflux or allergies but endorses a runny nose. Pt denies concerns with swallowing. Living Status: Lives with grandson, private residence  Occupation/School: Retired from [employer]  Medication Management: Secondary- caregiver assistance  Finance Management: Secondary- caregiver assistance  Hearing: Bilateral hearing aid  Vision: Wears glasses for reading    Relevant Medical History:  [x] Patient history, allergies, meds reviewed. Medical chart reviewed. See intake form. Review Of Systems (ROS):  [x]Performed Review of systems (Integumentary, CardioPulmonary, Neurological) by intake and observation. Intake form has been scanned into medical record. Patient has been instructed to contact their primary care physician regarding ROS issues if not already being addressed at this time.       Co-morbidities/Complexities (which will affect course of rehabilitation):   []None           Cardiovascular conditions   [x]Hypertension (I10)  [x]Hyperlipidemia (E78.5)  []Angina pectoris (I20)  []Atherosclerosis (I70)  [x]Other: Chronic combined systolic and diastolic heart failure   Gastrointestinal conditions   []GERD (K21.9)  []Other: Pulmonary conditions   []Asthma (J45)  []Coughing   []COPD (J44.9)  []Other:   Psychological Disorders  []Anxiety (F41.9)  []Depression (F32.9)   []Other:   Neurological Disorders  []Dementia (F03.90)  []Parkinson's Disease

## 2023-08-17 ENCOUNTER — HOSPITAL ENCOUNTER (OUTPATIENT)
Dept: SPEECH THERAPY | Age: 83
Setting detail: THERAPIES SERIES
Discharge: HOME | End: 2023-08-17
Attending: FAMILY MEDICINE
Payer: MEDICARE

## 2023-08-17 NOTE — PROGRESS NOTES
Speech Therapy  Cancellation/No-show Note  Patient Name:  Tim Fernandes  :  1940   Date:  2023  Cancels to Date: 0  No-shows to Date: 1    Patient status for today's appointment patient:  []  Cancelled  []  Rescheduled appointment  [x]  No-show ()     Reason given by patient:  []  Patient ill  []  Conflicting appointment  []  No transportation    []  Conflict with work  []  No reason given  [x]  Other:     Comments: no show    Phone call information:   [x]  Phone call made today to patient at [phone] time at number provided:      [x]  Patient answered, conversation as follows: pt forgot about appointment, next scheduled visit confirmed with pt   []  Patient did not answer, message left as follows:  []  Phone call not made today, pt called clinic and provided reason for cancellation    Electronically signed by:  Esa Kuhn, SLP

## 2023-08-18 DIAGNOSIS — M15.9 PRIMARY OSTEOARTHRITIS INVOLVING MULTIPLE JOINTS: ICD-10-CM

## 2023-08-18 RX ORDER — HYDROCODONE BITARTRATE AND ACETAMINOPHEN 5; 325 MG/1; MG/1
1 TABLET ORAL 4 TIMES DAILY PRN
Qty: 120 TABLET | Refills: 0 | Status: SHIPPED | OUTPATIENT
Start: 2023-08-18 | End: 2023-09-17

## 2023-08-18 NOTE — TELEPHONE ENCOUNTER
HYDROcodone-acetaminophen (NORCO) 5-325 MG per tablet [[id number]]  [facility] [id number] [facility], [facility]0 [facility],[address] [place] [address] [phone] [facility] [phone]   [place] [id number]   Phone:  [phone]  Fax:  [phone]

## 2023-08-22 ENCOUNTER — HOSPITAL ENCOUNTER (OUTPATIENT)
Dept: SPEECH THERAPY | Age: 83
Setting detail: THERAPIES SERIES
Discharge: HOME | End: 2023-08-22
Attending: FAMILY MEDICINE
Payer: MEDICARE

## 2023-08-22 PROCEDURE — 92507 TX SP LANG VOICE COMM INDIV: CPT

## 2023-08-22 NOTE — FLOWSHEET NOTE
[address], [place]    Speech Therapy Daily Treatment Note  Date:  2023    Patient Name:  Julia Ugalde    :  1940  MRN: [id number]  Medical/Treatment Diagnosis Information:  R49.0 (ICD-10-CM) - Dysphonia          Treatment Diagnosis: Mild-Moderate Dysphonia    Insurance/Certification information: Medicare + [employer]; BMN, no PA  Physician Information: Dr. Rick Last MD  Plan of care signed (Y/N): Y (2023)    Date of Patient follow up with Physician: KAUSHAL    Functional outcome measure: Voice Handicap Index: 55    Progress Note: []  Yes  [x]  No  Next due by: Visit #10 or 2023    RESTRICTIONS/PRECAUTIONS: Cardiac hx  Latex Allergy:  [x]NO      []YES    Preferred Language for Healthcare:   [x]English       []other:    Visit # Insurance Allowable Date Range (if applicable)    BMN N/A       Pain level: 0/10     SUBJECTIVE:  Pt presented with daughter, reports little practice of exercises given at evaluation and continues with little water intake.      OBJECTIVE:   Exercises/Interventions:   Voice (59711) Reps Seconds Notes   Vocal Function Interventions:      Semi-Occluded Vocal Tract - Straw and bubbles x 5  - Straw sustained phonation and bubbles x 10       < 6 seconds Treatment to increase the interaction between airflow from the vibratory mechanism and the resonance features and engage the voice production system across multiple levels [employer] [place] 2006)   Resonant Voice Therapy      Vocal Function Exercises - Warm-up exercise: sustained \"e\" x 2  - Stretching exercise: \"knoll\" pitch glide up x 2  - Tsering exercise: \"knoll\" pitch glide down x 2  - Power exercise: sustained \"ol\" musical notes x 5 Sustained \"e\" 8 seconds    Easy Onset      LSVT      Respiratory Interventions:      Respiratory Coordination      Respiratory Support Education and training for diaphragmatic breathing:  - tactile support of hand on

## 2023-08-29 ENCOUNTER — HOSPITAL ENCOUNTER (OUTPATIENT)
Dept: SPEECH THERAPY | Age: 83
Setting detail: THERAPIES SERIES
Discharge: HOME | End: 2023-08-29
Attending: FAMILY MEDICINE
Payer: MEDICARE

## 2023-08-29 PROCEDURE — 92507 TX SP LANG VOICE COMM INDIV: CPT

## 2023-08-29 NOTE — FLOWSHEET NOTE
[address], [facility]    Speech Therapy Daily Treatment Note  Date:  2023    Patient Name:  Cathryn Hughes    :  1940  MRN: [id number]  Medical/Treatment Diagnosis Information:  R49.0 (ICD-10-CM) - Dysphonia          Treatment Diagnosis: Mild-Moderate Dysphonia    Insurance/Certification information: Medicare + [address] [place] [address] Works [employer]; BMN, no PA  Physician Information: Dr. Tan Jones MD  Plan of care signed (Y/N): Y (2023)    Date of Patient follow up with Physician: KAUSHAL    Functional outcome measure: Voice Handicap Index: 55    Progress Note: []  Yes  [x]  No  Next due by: Visit #10 or 2023    RESTRICTIONS/PRECAUTIONS: Cardiac hx  Latex Allergy:  [x]NO      []YES    Preferred Language for Healthcare:   [x]English       []other:    Visit # Insurance Allowable Date Range (if applicable)    BMN N/A       Pain level: 0/10     SUBJECTIVE:  Pt presented with daughter, again reports she has not been completing home practice or drinking more water but states she plans to start, has not been feeling well over the past few days.      OBJECTIVE:   Exercises/Interventions:   Voice (20487) Reps Seconds Notes   Vocal Function Interventions:      Semi-Occluded Vocal Tract - Straw and bubbles x 5  - Straw sustained phonation and bubbles x 5  - Stretching with bubbles x 5  - Tsering with bubbles x 5         6-8 seconds Treatment to increase the interaction between airflow from the vibratory mechanism and the resonance features and engage the voice production system across multiple levels (Phuong, 2006)   Resonant Voice Therapy - Sustained hum x 3  - Prolonged /m/ syllables x 5  - Prolonged /m/ one-syllable words x 10, vocal quality judged to be 100% by SLP  - Prolonged /m/ two-syllable words x 10, vocal quality judged to be 80% by SLP  - Prolonged /m/ short sentences x 10,   vocal quality judged to be 70% by SLP and volume >65 dB in 80%

## 2023-09-05 ENCOUNTER — HOSPITAL ENCOUNTER (OUTPATIENT)
Dept: SPEECH THERAPY | Age: 83
Setting detail: THERAPIES SERIES
Discharge: HOME | End: 2023-09-05
Attending: FAMILY MEDICINE
Payer: MEDICARE

## 2023-09-05 PROCEDURE — 92507 TX SP LANG VOICE COMM INDIV: CPT

## 2023-09-05 NOTE — FLOWSHEET NOTE
[address]    Speech Therapy Daily Treatment Note  Date:  2023    Patient Name:  Tim Fernandes    :  1940  MRN: [id number]  Medical/Treatment Diagnosis Information:  R49.0 (ICD-10-CM) - Dysphonia          Treatment Diagnosis: Mild-Moderate Dysphonia    Insurance/Certification information: Medicare + [address] [place] [address] Works [employer]; BMN, no PA  Physician Information: Dr. Marshall Hughes MD  Plan of care signed (Y/N): Y (2023)    Date of Patient follow up with Physician: KAUSHAL    Functional outcome measure: Voice Handicap Index: 55    Progress Note: []  Yes  [x]  No  Next due by: Visit #10 or 2023    RESTRICTIONS/PRECAUTIONS: Cardiac hx  Latex Allergy:  [x]NO      []YES    Preferred Language for Healthcare:   [x]English       []other:    Visit # Insurance Allowable Date Range (if applicable)    BMN N/A       Pain level: 0/10     SUBJECTIVE: Pt presented with daughter, reports a little increase in water intake and home practice, ongoing runny nose.      OBJECTIVE:   Exercises/Interventions:   Voice (37100) Reps Seconds Notes   Vocal Function Interventions:      Semi-Occluded Vocal Tract - Straw and bubbles x 5  - Straw sustained phonation and bubbles x 5  - Stretching with bubbles x 5  - Tsering with bubbles x 5         8-10 seconds Treatment to increase the interaction between airflow from the vibratory mechanism and the resonance features and engage the voice production system across multiple levels (Phuong, 2006)   Resonant Voice Therapy - Prolonged /m/ one-syllable words x 10, vocal quality judged to be 100% by SLP  - Prolonged /m/ two-syllable words x 10, vocal quality judged to be 80% by SLP  - Prolonged /m/ short sentences x 10,   vocal quality judged to be 60% by SLP       Vocal Function Exercises - Warm-up exercise: sustained \"e\" x 2  - Stretching exercise: \"knoll\" pitch glide up x 2  - Tsering exercise: \"knoll\"

## 2023-09-06 DIAGNOSIS — E03.9 ACQUIRED HYPOTHYROIDISM: ICD-10-CM

## 2023-09-06 DIAGNOSIS — N18.32 STAGE 3B CHRONIC KIDNEY DISEASE (HCC): ICD-10-CM

## 2023-09-07 ENCOUNTER — TELEPHONE (OUTPATIENT)
Dept: FAMILY MEDICINE CLINIC | Age: 83
End: 2023-09-07

## 2023-09-07 DIAGNOSIS — D61.818 PANCYTOPENIA (HCC): Primary | ICD-10-CM

## 2023-09-07 LAB
ALBUMIN SERPL-MCNC: 4.2 G/DL (ref 3.4–5)
ANION GAP SERPL CALCULATED.3IONS-SCNC: 12 MMOL/L (ref 3–16)
BUN SERPL-MCNC: 46 MG/DL (ref 7–20)
CALCIUM SERPL-MCNC: 8.9 MG/DL (ref 8.3–10.6)
CHLORIDE SERPL-SCNC: 112 MMOL/L (ref 99–110)
CO2 SERPL-SCNC: 21 MMOL/L (ref 21–32)
CREAT SERPL-MCNC: 1.6 MG/DL (ref 0.6–1.2)
CREAT UR-MCNC: 118.3 MG/DL (ref 28–259)
DEPRECATED RDW RBC AUTO: 23.9 % (ref 12.4–15.4)
GFR SERPLBLD CREATININE-BSD FMLA CKD-EPI: 32 ML/MIN/{1.73_M2}
GLUCOSE SERPL-MCNC: 106 MG/DL (ref 70–99)
HCT VFR BLD AUTO: 31.2 % (ref 36–48)
HGB BLD-MCNC: 10.5 G/DL (ref 12–16)
MCH RBC QN AUTO: 28.3 PG (ref 26–34)
MCHC RBC AUTO-ENTMCNC: 33.7 G/DL (ref 31–36)
MCV RBC AUTO: 84.2 FL (ref 80–100)
PATH INTERP BLD-IMP: NORMAL
PATH INTERP BLD-IMP: YES
PHOSPHATE SERPL-MCNC: 3.8 MG/DL (ref 2.5–4.9)
PLATELET # BLD AUTO: 102 K/UL (ref 135–450)
PLATELET BLD QL SMEAR: ABNORMAL
PMV BLD AUTO: 9.3 FL (ref 5–10.5)
POTASSIUM SERPL-SCNC: 4.5 MMOL/L (ref 3.5–5.1)
PROT UR-MCNC: 20 MG/DL
PROT/CREAT UR-RTO: 0.2 MG/DL
RBC # BLD AUTO: 3.7 M/UL (ref 4–5.2)
SLIDE REVIEW: ABNORMAL
SODIUM SERPL-SCNC: 145 MMOL/L (ref 136–145)
TSH SERPL DL<=0.005 MIU/L-ACNC: 0.29 UIU/ML (ref 0.27–4.2)
WBC # BLD AUTO: 1.3 K/UL (ref 4–11)

## 2023-09-07 NOTE — TELEPHONE ENCOUNTER
Tor Plan with Dr. Khalif Martinez called with critical results for pt. She stated that they have discuss results with patient. WBC was 1.3 yesterday.

## 2023-09-07 NOTE — TELEPHONE ENCOUNTER
The lab test demonstrates low blood counts in general.  I would recommend we recheck this ASAP and we will do a CBC with differential with a diagnosis of pancytopenia. I tried to call the patient and there was no answer and her daughter phone number went to voicemail.

## 2023-09-11 DIAGNOSIS — D61.818 PANCYTOPENIA (HCC): ICD-10-CM

## 2023-09-12 ENCOUNTER — APPOINTMENT (OUTPATIENT)
Dept: SPEECH THERAPY | Age: 83
End: 2023-09-12
Attending: FAMILY MEDICINE
Payer: MEDICARE

## 2023-09-12 ENCOUNTER — TELEPHONE (OUTPATIENT)
Dept: FAMILY MEDICINE CLINIC | Age: 83
End: 2023-09-12

## 2023-09-12 DIAGNOSIS — D61.818 PANCYTOPENIA (HCC): Primary | ICD-10-CM

## 2023-09-12 LAB
BASOPHILS # BLD: 0.1 K/UL (ref 0–0.2)
BASOPHILS NFR BLD: 4 %
BURR CELLS BLD QL SMEAR: ABNORMAL
DEPRECATED RDW RBC AUTO: 24.9 % (ref 12.4–15.4)
EOSINOPHIL # BLD: 0.1 K/UL (ref 0–0.6)
EOSINOPHIL NFR BLD: 5 %
HCT VFR BLD AUTO: 29.8 % (ref 36–48)
HGB BLD-MCNC: 9.9 G/DL (ref 12–16)
LYMPHOCYTES # BLD: 0.4 K/UL (ref 1–5.1)
LYMPHOCYTES NFR BLD: 31 %
MCH RBC QN AUTO: 28.3 PG (ref 26–34)
MCHC RBC AUTO-ENTMCNC: 33.2 G/DL (ref 31–36)
MCV RBC AUTO: 85.3 FL (ref 80–100)
MONOCYTES # BLD: 0 K/UL (ref 0–1.3)
MONOCYTES NFR BLD: 0 %
NEUTROPHILS # BLD: 0.8 K/UL (ref 1.7–7.7)
NEUTROPHILS NFR BLD: 60 %
OVALOCYTES BLD QL SMEAR: ABNORMAL
PATH INTERP BLD-IMP: NO
PLATELET # BLD AUTO: 106 K/UL (ref 135–450)
PLATELET BLD QL SMEAR: ABNORMAL
PMV BLD AUTO: 9.7 FL (ref 5–10.5)
POIKILOCYTOSIS BLD QL SMEAR: ABNORMAL
RBC # BLD AUTO: 3.5 M/UL (ref 4–5.2)
SLIDE REVIEW: ABNORMAL
WBC # BLD AUTO: 1.4 K/UL (ref 4–11)

## 2023-09-19 ENCOUNTER — APPOINTMENT (OUTPATIENT)
Dept: SPEECH THERAPY | Age: 83
End: 2023-09-19
Attending: FAMILY MEDICINE
Payer: MEDICARE

## 2023-09-19 RX ORDER — CLOPIDOGREL BISULFATE 75 MG/1
75 TABLET ORAL DAILY
Qty: 90 TABLET | Refills: 0 | Status: SHIPPED | OUTPATIENT
Start: 2023-09-19 | End: 2023-09-25

## 2023-09-19 NOTE — TELEPHONE ENCOUNTER
Last OV: 23  Last labs:23  Last EK23   Appt scheduled : 10/24/23        clopidogrel (PLAVIX) 75 MG tablet 90 tablet 2 2022     Sig - Route:  Take 1 tablet by mouth daily - Oral    Sent to pharmacy as: Clopidogrel Bisulfate 75 MG Oral Tablet (Plavix)    Notes to Pharmacy: Patient lost her prescription that she picked up    Cosign for Ordering: Accepted by Lizet Pandya MD on 2022  8:00 AM    E-Prescribing Status: Receipt confirmed by pharmacy (2022  9:05 AM EST)

## 2023-09-20 DIAGNOSIS — M15.9 PRIMARY OSTEOARTHRITIS INVOLVING MULTIPLE JOINTS: ICD-10-CM

## 2023-09-20 NOTE — TELEPHONE ENCOUNTER
Medication:   Requested Prescriptions     Pending Prescriptions Disp Refills    HYDROcodone-acetaminophen (NORCO) 5-325 MG per tablet 120 tablet 0     Sig: Take 1 tablet by mouth 4 times daily as needed for Pain for up to 30 days. Last Filled:  8/18    Patient Phone Number: [phone] (home)     Last appt: 8/4/2023   Next appt: 11/6/2023    Last OARRS:       6/1/2021     4:12 PM   RX Monitoring   Periodic Controlled Substance Monitoring Possible medication side effects, risk of tolerance/dependence & alternative treatments discussed.      PDMP Monitoring:    Last PDMP Saima Hannah as Reviewed [facility]):  Review User Review Instant Review Result   Jonnathan Julian 8/4/2023  1:05 PM Reviewed PDMP [1]     Preferred Pharmacy:   [facility] [id number] - [id number], [address] [place] [address] [phone] Rhonda Chowdhury [phone] [address] [facility] [address]  Phone: [phone] Fax: [phone]    [facility] Mail [facility] - [facility] [phone] - F [phone] [address] [facility] [address]  Phone: [phone] Fax: [phone]

## 2023-09-21 RX ORDER — HYDROCODONE BITARTRATE AND ACETAMINOPHEN 5; 325 MG/1; MG/1
1 TABLET ORAL 4 TIMES DAILY PRN
Qty: 120 TABLET | Refills: 0 | Status: SHIPPED | OUTPATIENT
Start: 2023-09-21 | End: 2023-10-21

## 2023-09-25 ENCOUNTER — TELEPHONE (OUTPATIENT)
Dept: CARDIOLOGY CLINIC | Age: 83
End: 2023-09-25

## 2023-09-25 NOTE — TELEPHONE ENCOUNTER
----- Message from Denisse Norton MD sent at 9/24/2023  8:57 PM EDT -----  Can change to ASA only. ----- Message -----  From: TIFFANIE Alva - BRIA  Sent: 9/19/2023   9:31 AM EDT  To: Denisse Norton MD    Does she still need DAPT or can we stop plavix?

## 2023-09-26 DIAGNOSIS — N18.30 BENIGN HYPERTENSION WITH CHRONIC KIDNEY DISEASE, STAGE III (HCC): ICD-10-CM

## 2023-09-26 DIAGNOSIS — R80.1 PERSISTENT PROTEINURIA: ICD-10-CM

## 2023-09-26 DIAGNOSIS — I50.22 CHRONIC SYSTOLIC CONGESTIVE HEART FAILURE (HCC): ICD-10-CM

## 2023-09-26 DIAGNOSIS — N18.32 STAGE 3B CHRONIC KIDNEY DISEASE (HCC): ICD-10-CM

## 2023-09-26 DIAGNOSIS — I12.9 BENIGN HYPERTENSION WITH CHRONIC KIDNEY DISEASE, STAGE III (HCC): ICD-10-CM

## 2023-09-26 LAB
ALBUMIN SERPL-MCNC: 4.4 G/DL (ref 3.4–5)
ANION GAP SERPL CALCULATED.3IONS-SCNC: 12 MMOL/L (ref 3–16)
BUN SERPL-MCNC: 38 MG/DL (ref 7–20)
CALCIUM SERPL-MCNC: 8.8 MG/DL (ref 8.3–10.6)
CHLORIDE SERPL-SCNC: 112 MMOL/L (ref 99–110)
CO2 SERPL-SCNC: 20 MMOL/L (ref 21–32)
CREAT SERPL-MCNC: 1.4 MG/DL (ref 0.6–1.2)
GFR SERPLBLD CREATININE-BSD FMLA CKD-EPI: 37 ML/MIN/{1.73_M2}
GLUCOSE SERPL-MCNC: 95 MG/DL (ref 70–99)
PHOSPHATE SERPL-MCNC: 3.4 MG/DL (ref 2.5–4.9)
POTASSIUM SERPL-SCNC: 4.4 MMOL/L (ref 3.5–5.1)
SODIUM SERPL-SCNC: 144 MMOL/L (ref 136–145)

## 2023-09-29 ENCOUNTER — APPOINTMENT (OUTPATIENT)
Dept: GENERAL RADIOLOGY | Age: 83
DRG: 809 | End: 2023-09-29
Payer: MEDICARE

## 2023-09-29 ENCOUNTER — HOSPITAL ENCOUNTER (INPATIENT)
Age: 83
LOS: 2 days | Discharge: HOME | DRG: 809 | End: 2023-10-01
Attending: EMERGENCY MEDICINE | Admitting: PEDIATRICS
Payer: MEDICARE

## 2023-09-29 ENCOUNTER — APPOINTMENT (OUTPATIENT)
Dept: CT IMAGING | Age: 83
DRG: 809 | End: 2023-09-29
Payer: MEDICARE

## 2023-09-29 ENCOUNTER — TELEPHONE (OUTPATIENT)
Dept: FAMILY MEDICINE CLINIC | Age: 83
End: 2023-09-29

## 2023-09-29 DIAGNOSIS — R26.2 INABILITY TO WALK: ICD-10-CM

## 2023-09-29 DIAGNOSIS — R53.1 GENERALIZED WEAKNESS: Primary | ICD-10-CM

## 2023-09-29 DIAGNOSIS — D61.818 PANCYTOPENIA (HCC): ICD-10-CM

## 2023-09-29 LAB
ALBUMIN SERPL-MCNC: 3.7 G/DL (ref 3.4–5)
ALBUMIN/GLOB SERPL: 1.3 {RATIO} (ref 1.1–2.2)
ALP SERPL-CCNC: 104 U/L (ref 40–129)
ALT SERPL-CCNC: 12 U/L (ref 10–40)
ANION GAP SERPL CALCULATED.3IONS-SCNC: 10 MMOL/L (ref 3–16)
AST SERPL-CCNC: 20 U/L (ref 15–37)
BILIRUB SERPL-MCNC: 0.3 MG/DL (ref 0–1)
BUN SERPL-MCNC: 48 MG/DL (ref 7–20)
CALCIUM SERPL-MCNC: 8.7 MG/DL (ref 8.3–10.6)
CHLORIDE SERPL-SCNC: 108 MMOL/L (ref 99–110)
CO2 SERPL-SCNC: 19 MMOL/L (ref 21–32)
CREAT SERPL-MCNC: 1.6 MG/DL (ref 0.6–1.2)
GFR SERPLBLD CREATININE-BSD FMLA CKD-EPI: 32 ML/MIN/{1.73_M2}
GLUCOSE BLD-MCNC: 116 MG/DL (ref 70–99)
GLUCOSE SERPL-MCNC: 135 MG/DL (ref 70–99)
LACTATE BLDV-SCNC: 1.1 MMOL/L (ref 0.4–1.9)
PERFORMED ON: ABNORMAL
POTASSIUM SERPL-SCNC: 3.8 MMOL/L (ref 3.5–5.1)
PROT SERPL-MCNC: 6.6 G/DL (ref 6.4–8.2)
SODIUM SERPL-SCNC: 137 MMOL/L (ref 136–145)
TROPONIN, HIGH SENSITIVITY: 55 NG/L (ref 0–14)
TROPONIN, HIGH SENSITIVITY: 62 NG/L (ref 0–14)

## 2023-09-29 PROCEDURE — 73030 X-RAY EXAM OF SHOULDER: CPT

## 2023-09-29 PROCEDURE — 1200000000 HC SEMI PRIVATE

## 2023-09-29 PROCEDURE — 93005 ELECTROCARDIOGRAM TRACING: CPT | Performed by: EMERGENCY MEDICINE

## 2023-09-29 PROCEDURE — 85025 COMPLETE CBC W/AUTO DIFF WBC: CPT

## 2023-09-29 PROCEDURE — 71045 X-RAY EXAM CHEST 1 VIEW: CPT

## 2023-09-29 PROCEDURE — 83605 ASSAY OF LACTIC ACID: CPT

## 2023-09-29 PROCEDURE — 70450 CT HEAD/BRAIN W/O DYE: CPT

## 2023-09-29 PROCEDURE — 84484 ASSAY OF TROPONIN QUANT: CPT

## 2023-09-29 PROCEDURE — 80053 COMPREHEN METABOLIC PANEL: CPT

## 2023-09-29 PROCEDURE — 99285 EMERGENCY DEPT VISIT HI MDM: CPT

## 2023-09-29 PROCEDURE — 2580000003 HC RX 258: Performed by: EMERGENCY MEDICINE

## 2023-09-29 RX ORDER — SODIUM CHLORIDE, SODIUM LACTATE, POTASSIUM CHLORIDE, CALCIUM CHLORIDE 600; 310; 30; 20 MG/100ML; MG/100ML; MG/100ML; MG/100ML
INJECTION, SOLUTION INTRAVENOUS ONCE
Status: COMPLETED | OUTPATIENT
Start: 2023-09-29 | End: 2023-09-29

## 2023-09-29 RX ADMIN — SODIUM CHLORIDE, POTASSIUM CHLORIDE, SODIUM LACTATE AND CALCIUM CHLORIDE: 600; 310; 30; 20 INJECTION, SOLUTION INTRAVENOUS at 21:52

## 2023-09-29 ASSESSMENT — PAIN SCALES - GENERAL: PAINLEVEL_OUTOF10: 7

## 2023-09-29 ASSESSMENT — PAIN - FUNCTIONAL ASSESSMENT: PAIN_FUNCTIONAL_ASSESSMENT: 0-10

## 2023-09-29 ASSESSMENT — PAIN DESCRIPTION - LOCATION: LOCATION: GENERALIZED

## 2023-09-29 NOTE — TELEPHONE ENCOUNTER
Patient had a cystoscopy and bone marrow biopsy recently, she has been overall very weak sense. Daughter is wanting to know if you can place a new referral for PT @ [facility]. Please advise.

## 2023-09-30 LAB
ANION GAP SERPL CALCULATED.3IONS-SCNC: 9 MMOL/L (ref 3–16)
BACTERIA URNS QL MICRO: ABNORMAL /HPF
BILIRUB UR QL STRIP.AUTO: NEGATIVE
BUN SERPL-MCNC: 43 MG/DL (ref 7–20)
CALCIUM SERPL-MCNC: 8.5 MG/DL (ref 8.3–10.6)
CHLORIDE SERPL-SCNC: 109 MMOL/L (ref 99–110)
CLARITY UR: ABNORMAL
CO2 SERPL-SCNC: 19 MMOL/L (ref 21–32)
COLOR UR: YELLOW
CREAT SERPL-MCNC: 1.4 MG/DL (ref 0.6–1.2)
EKG ATRIAL RATE: 70 BPM
EKG DIAGNOSIS: NORMAL
EKG P AXIS: 17 DEGREES
EKG P-R INTERVAL: 232 MS
EKG Q-T INTERVAL: 404 MS
EKG QRS DURATION: 104 MS
EKG QTC CALCULATION (BAZETT): 436 MS
EKG R AXIS: -14 DEGREES
EKG T AXIS: 130 DEGREES
EKG VENTRICULAR RATE: 70 BPM
EPI CELLS #/AREA URNS AUTO: 3 /HPF (ref 0–5)
GFR SERPLBLD CREATININE-BSD FMLA CKD-EPI: 37 ML/MIN/{1.73_M2}
GLUCOSE SERPL-MCNC: 116 MG/DL (ref 70–99)
GLUCOSE UR STRIP.AUTO-MCNC: NEGATIVE MG/DL
HCT VFR BLD AUTO: 22.4 % (ref 36–48)
HCT VFR BLD AUTO: 25 % (ref 36–48)
HGB BLD-MCNC: 7.5 G/DL (ref 12–16)
HGB BLD-MCNC: 8.1 G/DL (ref 12–16)
HGB UR QL STRIP.AUTO: NEGATIVE
HYALINE CASTS #/AREA URNS AUTO: 1 /LPF (ref 0–8)
KETONES UR STRIP.AUTO-MCNC: NEGATIVE MG/DL
LEUKOCYTE ESTERASE UR QL STRIP.AUTO: ABNORMAL
NITRITE UR QL STRIP.AUTO: NEGATIVE
PH UR STRIP.AUTO: 6.5 [PH] (ref 5–8)
POTASSIUM SERPL-SCNC: 3.6 MMOL/L (ref 3.5–5.1)
PROT UR STRIP.AUTO-MCNC: 30 MG/DL
RBC CLUMPS #/AREA URNS AUTO: 0 /HPF (ref 0–4)
SODIUM SERPL-SCNC: 137 MMOL/L (ref 136–145)
SP GR UR STRIP.AUTO: 1.01 (ref 1–1.03)
UA COMPLETE W REFLEX CULTURE PNL UR: YES
UA DIPSTICK W REFLEX MICRO PNL UR: YES
URN SPEC COLLECT METH UR: ABNORMAL
UROBILINOGEN UR STRIP-ACNC: 1 E.U./DL
WBC #/AREA URNS AUTO: 68 /HPF (ref 0–5)

## 2023-09-30 PROCEDURE — 87086 URINE CULTURE/COLONY COUNT: CPT

## 2023-09-30 PROCEDURE — 85025 COMPLETE CBC W/AUTO DIFF WBC: CPT

## 2023-09-30 PROCEDURE — 93010 ELECTROCARDIOGRAM REPORT: CPT | Performed by: INTERNAL MEDICINE

## 2023-09-30 PROCEDURE — 36415 COLL VENOUS BLD VENIPUNCTURE: CPT

## 2023-09-30 PROCEDURE — 97116 GAIT TRAINING THERAPY: CPT

## 2023-09-30 PROCEDURE — 6370000000 HC RX 637 (ALT 250 FOR IP): Performed by: PEDIATRICS

## 2023-09-30 PROCEDURE — 97165 OT EVAL LOW COMPLEX 30 MIN: CPT

## 2023-09-30 PROCEDURE — 51798 US URINE CAPACITY MEASURE: CPT

## 2023-09-30 PROCEDURE — 97530 THERAPEUTIC ACTIVITIES: CPT

## 2023-09-30 PROCEDURE — 97535 SELF CARE MNGMENT TRAINING: CPT

## 2023-09-30 PROCEDURE — 87186 SC STD MICRODIL/AGAR DIL: CPT

## 2023-09-30 PROCEDURE — 87077 CULTURE AEROBIC IDENTIFY: CPT

## 2023-09-30 PROCEDURE — 81001 URINALYSIS AUTO W/SCOPE: CPT

## 2023-09-30 PROCEDURE — 85018 HEMOGLOBIN: CPT

## 2023-09-30 PROCEDURE — 97161 PT EVAL LOW COMPLEX 20 MIN: CPT

## 2023-09-30 PROCEDURE — 85014 HEMATOCRIT: CPT

## 2023-09-30 PROCEDURE — 1200000000 HC SEMI PRIVATE

## 2023-09-30 PROCEDURE — 84443 ASSAY THYROID STIM HORMONE: CPT

## 2023-09-30 PROCEDURE — 2580000003 HC RX 258: Performed by: PEDIATRICS

## 2023-09-30 PROCEDURE — 87040 BLOOD CULTURE FOR BACTERIA: CPT

## 2023-09-30 PROCEDURE — 80048 BASIC METABOLIC PNL TOTAL CA: CPT

## 2023-09-30 RX ORDER — ACETAMINOPHEN 650 MG/1
650 SUPPOSITORY RECTAL EVERY 6 HOURS PRN
Status: DISCONTINUED | OUTPATIENT
Start: 2023-09-30 | End: 2023-10-01 | Stop reason: HOSPADM

## 2023-09-30 RX ORDER — LACTULOSE 10 G/15ML
20 SOLUTION ORAL ONCE
Status: COMPLETED | OUTPATIENT
Start: 2023-09-30 | End: 2023-09-30

## 2023-09-30 RX ORDER — ACETAMINOPHEN 325 MG/1
650 TABLET ORAL EVERY 6 HOURS PRN
Status: DISCONTINUED | OUTPATIENT
Start: 2023-09-30 | End: 2023-10-01 | Stop reason: HOSPADM

## 2023-09-30 RX ORDER — ENOXAPARIN SODIUM 100 MG/ML
40 INJECTION SUBCUTANEOUS DAILY
Status: DISCONTINUED | OUTPATIENT
Start: 2023-09-30 | End: 2023-09-30 | Stop reason: ALTCHOICE

## 2023-09-30 RX ORDER — LEVOTHYROXINE SODIUM 112 UG/1
112 TABLET ORAL
Status: DISCONTINUED | OUTPATIENT
Start: 2023-09-30 | End: 2023-10-01 | Stop reason: HOSPADM

## 2023-09-30 RX ORDER — POLYETHYLENE GLYCOL 3350 17 G/17G
17 POWDER, FOR SOLUTION ORAL DAILY PRN
Status: DISCONTINUED | OUTPATIENT
Start: 2023-09-30 | End: 2023-10-01 | Stop reason: HOSPADM

## 2023-09-30 RX ORDER — SODIUM CHLORIDE 0.9 % (FLUSH) 0.9 %
5-40 SYRINGE (ML) INJECTION EVERY 12 HOURS SCHEDULED
Status: DISCONTINUED | OUTPATIENT
Start: 2023-09-30 | End: 2023-10-01 | Stop reason: HOSPADM

## 2023-09-30 RX ORDER — TORSEMIDE 20 MG/1
10 TABLET ORAL DAILY
Status: DISCONTINUED | OUTPATIENT
Start: 2023-09-30 | End: 2023-10-01 | Stop reason: HOSPADM

## 2023-09-30 RX ORDER — ASPIRIN 81 MG/1
81 TABLET ORAL DAILY
Status: DISCONTINUED | OUTPATIENT
Start: 2023-09-30 | End: 2023-10-01 | Stop reason: HOSPADM

## 2023-09-30 RX ORDER — SODIUM CHLORIDE 9 MG/ML
INJECTION, SOLUTION INTRAVENOUS PRN
Status: DISCONTINUED | OUTPATIENT
Start: 2023-09-30 | End: 2023-10-01 | Stop reason: HOSPADM

## 2023-09-30 RX ORDER — ROSUVASTATIN CALCIUM 10 MG/1
5 TABLET, COATED ORAL NIGHTLY
Status: DISCONTINUED | OUTPATIENT
Start: 2023-09-30 | End: 2023-10-01 | Stop reason: HOSPADM

## 2023-09-30 RX ORDER — HEPARIN SODIUM 5000 [USP'U]/ML
5000 INJECTION, SOLUTION INTRAVENOUS; SUBCUTANEOUS 2 TIMES DAILY
Status: DISCONTINUED | OUTPATIENT
Start: 2023-09-30 | End: 2023-10-01 | Stop reason: HOSPADM

## 2023-09-30 RX ORDER — ONDANSETRON 2 MG/ML
4 INJECTION INTRAMUSCULAR; INTRAVENOUS EVERY 6 HOURS PRN
Status: DISCONTINUED | OUTPATIENT
Start: 2023-09-30 | End: 2023-10-01 | Stop reason: HOSPADM

## 2023-09-30 RX ORDER — SODIUM CHLORIDE 0.9 % (FLUSH) 0.9 %
5-40 SYRINGE (ML) INJECTION PRN
Status: DISCONTINUED | OUTPATIENT
Start: 2023-09-30 | End: 2023-10-01 | Stop reason: HOSPADM

## 2023-09-30 RX ORDER — LOSARTAN POTASSIUM 25 MG/1
12.5 TABLET ORAL DAILY
Status: DISCONTINUED | OUTPATIENT
Start: 2023-09-30 | End: 2023-10-01 | Stop reason: HOSPADM

## 2023-09-30 RX ORDER — ONDANSETRON 4 MG/1
4 TABLET, ORALLY DISINTEGRATING ORAL EVERY 8 HOURS PRN
Status: DISCONTINUED | OUTPATIENT
Start: 2023-09-30 | End: 2023-10-01 | Stop reason: HOSPADM

## 2023-09-30 RX ORDER — ALBUTEROL SULFATE 90 UG/1
2 AEROSOL, METERED RESPIRATORY (INHALATION) EVERY 4 HOURS PRN
Status: DISCONTINUED | OUTPATIENT
Start: 2023-09-30 | End: 2023-10-01 | Stop reason: HOSPADM

## 2023-09-30 RX ORDER — ALLOPURINOL 100 MG/1
100 TABLET ORAL DAILY
Status: DISCONTINUED | OUTPATIENT
Start: 2023-09-30 | End: 2023-10-01 | Stop reason: HOSPADM

## 2023-09-30 RX ORDER — HYDROCODONE BITARTRATE AND ACETAMINOPHEN 5; 325 MG/1; MG/1
1 TABLET ORAL 4 TIMES DAILY PRN
Status: DISCONTINUED | OUTPATIENT
Start: 2023-09-30 | End: 2023-10-01 | Stop reason: HOSPADM

## 2023-09-30 RX ORDER — TOPIRAMATE 25 MG/1
50 TABLET ORAL 2 TIMES DAILY
Status: DISCONTINUED | OUTPATIENT
Start: 2023-09-30 | End: 2023-10-01 | Stop reason: HOSPADM

## 2023-09-30 RX ADMIN — TOPIRAMATE 50 MG: 25 TABLET, FILM COATED ORAL at 10:01

## 2023-09-30 RX ADMIN — Medication 10 ML: at 10:01

## 2023-09-30 RX ADMIN — LOSARTAN POTASSIUM 12.5 MG: 25 TABLET, FILM COATED ORAL at 10:00

## 2023-09-30 RX ADMIN — LEVOTHYROXINE SODIUM 112 MCG: 0.11 TABLET ORAL at 10:00

## 2023-09-30 RX ADMIN — HYDROCODONE BITARTRATE AND ACETAMINOPHEN 1 TABLET: 5; 325 TABLET ORAL at 21:12

## 2023-09-30 RX ADMIN — TOPIRAMATE 50 MG: 25 TABLET, FILM COATED ORAL at 21:12

## 2023-09-30 RX ADMIN — LACTULOSE 20 G: 20 SOLUTION ORAL at 10:01

## 2023-09-30 RX ADMIN — Medication 10 ML: at 20:00

## 2023-09-30 RX ADMIN — ASPIRIN 81 MG: 81 TABLET, COATED ORAL at 10:00

## 2023-09-30 RX ADMIN — ROSUVASTATIN 5 MG: 10 TABLET, FILM COATED ORAL at 21:12

## 2023-09-30 RX ADMIN — ALLOPURINOL 100 MG: 100 TABLET ORAL at 10:01

## 2023-09-30 RX ADMIN — TORSEMIDE 10 MG: 20 TABLET ORAL at 10:00

## 2023-09-30 ASSESSMENT — PAIN SCALES - GENERAL
PAINLEVEL_OUTOF10: 5
PAINLEVEL_OUTOF10: 8

## 2023-09-30 ASSESSMENT — PAIN DESCRIPTION - ORIENTATION: ORIENTATION: RIGHT;LEFT;MID

## 2023-09-30 ASSESSMENT — PAIN DESCRIPTION - LOCATION
LOCATION: NECK
LOCATION: NECK

## 2023-09-30 NOTE — PLAN OF CARE
Problem: Safety - Adult  Goal: Free from fall injury  Outcome: Progressing     Problem: Skin/Tissue Integrity  Goal: Absence of new skin breakdown  Description: 1. Monitor for areas of redness and/or skin breakdown  2. Assess vascular access sites hourly  3. Every 4-6 hours minimum:  Change oxygen saturation probe site  4. Every 4-6 hours:  If on nasal continuous positive airway pressure, respiratory therapy assess nares and determine need for appliance change or resting period.   Outcome: Progressing     Problem: Cardiovascular - Adult  Goal: Maintains optimal cardiac output and hemodynamic stability  Outcome: Progressing     Problem: Skin/Tissue Integrity - Adult  Goal: Skin integrity remains intact  Outcome: Progressing     Problem: Musculoskeletal - Adult  Goal: Return mobility to safest level of function  Outcome: Progressing     Problem: Metabolic/Fluid and Electrolytes - Adult  Goal: Hemodynamic stability and optimal renal function maintained  Outcome: Progressing     Problem: Hematologic - Adult  Goal: Maintains hematologic stability  Outcome: Progressing     Problem: Gastrointestinal - Adult  Goal: Maintains or returns to baseline bowel function  Outcome: Progressing

## 2023-09-30 NOTE — H&P
aortic valve insufficiency     Movement disorder     back problems    Pacemaker     Peptic ulcer, unspecified site, unspecified as acute or chronic, without mention of hemorrhage, perforation, or obstruction     Thyroid disease     Unspecified disorder of kidney and ureter     Unspecified hypothyroidism      PSHX:  has a past surgical history that includes back surgery; Cholecystectomy; Cardiac surgery; Coronary artery bypass graft (1987); shoulder surgery; Cardiac catheterization (7/2012); hip surgery (Left, 03/16/2017); joint replacement; Cardiac catheterization (08/07/2018); Percutaneous Transluminal Coronary Angio (11/04/2014); pr njx aa&/strd tfrml epi lumbar/sacral 1 level (Right, 12/3/2018); Femoral-femoral Bypass Graft (N/A, 8/22/2019); femoral bypass (Left, 8/22/2019); and IR KYPHOPLASTY LUMBAR 1 VERTEBRAL BODY (5/14/2021). Allergies: Allergies   Allergen Reactions    Aspirin Nausea Only    Diltiazem Anaphylaxis    Diltiazem Hcl Anaphylaxis    Lorazepam Other (See Comments)     hallucinations  hallucinations  hallucinations    Sulfa Antibiotics Rash and Hives    Atorvastatin Other (See Comments)     Muscle pains  Muscle pains  Muscle pains    Dabigatran Nausea Only     And indigestion  And indigestion    Aka Pradaxa  And indigestion  And indigestion  And indigestion    Aka Pradaxa  And indigestion  And indigestion  And indigestion    Aka Pradaxa    Lisinopril      cough    Mysoline [Primidone]     Nsaids      Other reaction(s): Unknown    Other Other (See Comments)     Nitroglycerin patches causes severe headaches    Primidone      Other reaction(s): Unknown     Fam HX:   family history includes Cancer in her maternal grandmother, paternal grandmother, and sister; Diabetes in her brother and maternal uncle; Heart Disease in her brother, maternal aunt, and sister; Hypertension in her brother and paternal aunt; Stroke in her maternal aunt.   Soc HX:   Social History     Socioeconomic History    Marital days of incubation. 11/11/2022 06:55 PM     Lab Results   Component Value Date/Time    BLOODCULT2 No Growth after 4 days of incubation. 11/11/2022 06:55 PM     Organism:   Lab Results   Component Value Date/Time    ORG Klebsiella pneumoniae 12/10/2022 01:03 PM       Imaging/Diagnostics Last 24 Hours   CT HEAD WO CONTRAST    Result Date: 9/29/2023  EXAMINATION: CT OF THE HEAD WITHOUT CONTRAST  9/29/2023 8:26 pm TECHNIQUE: CT of the head was performed without the administration of intravenous contrast. Automated exposure control, iterative reconstruction, and/or weight based adjustment of the mA/kV was utilized to reduce the radiation dose to as low as reasonably achievable. COMPARISON: 01/22/2022 HISTORY: ORDERING SYSTEM PROVIDED HISTORY: leg weakness, confusion TECHNOLOGIST PROVIDED HISTORY: Has a \"code stroke\" or \"stroke alert\" been called? ->No Reason for exam:->leg weakness, confusion Decision Support Exception - unselect if not a suspected or confirmed emergency medical condition->Emergency Medical Condition (MA) Reason for Exam: Fatigue (Pt arrives to ED with daughter c/o weakness and abnormal labs, daughter states her red and white counts are low. Pt's daughter states she had a bone marrow sample taken and she also had a cystoscopy earlier this week. Pt's daughter states she has been getting worse since the bone marrow procedure on 9/27.) FINDINGS: BRAIN/VENTRICLES: There is no acute infarct or acute intracranial hemorrhage present. There is no mass effect or midline shift present. A remote right frontal lobe infarct is present. There is a remote left cerebellar infarct, unchanged. There is mild periventricular hypoattenuation. Mild diffuse cerebral volume loss is present. ORBITS: Limited evaluation of the orbits is unremarkable. SINUSES: The paranasal sinuses and mastoid air cells are clear. SOFT TISSUES/SKULL:  No lytic or blastic osseous lesions are identified.      1. No acute intracranial process

## 2023-09-30 NOTE — PROGRESS NOTES
Patient examined at bedside. Patient was admitted earlier this morning by my partner. I reviewed the history and physical and agree with the plan of care. Continue current measures. 80years old female presented with progressive weakness, note patient recently underwent bone marrow biopsy for leukopenia/pancytopenia as outpatient, results pending    Pancytopenia with back and shoulder pain. ANC of 400 this morning. Status post recent bone marrow biopsy on 9/27/2023. Oncology consulted on admission. Neutropenic precautions. Drop in H/H due to IVF. Close monitering. Serial H/H for 24 hrs    Encephalopathy intermittent PTA. UA pending this morning, follow-up TSH/ammonia levels. Patient back to baseline per daughter at bedside      Close clinical monitoring, will empirically draw blood cultures, low threshold to start empiric antibiotics considering neutropenia/pyurea         Chronic systolic heart failure, CAD, ischemic cardiomyopathy, atrial fibrillation, also hx of left fem-fem bypass in 2019    Recent hematuria prior to admission, now urine is clear status post cystoscopy on 9/26/2023.   Close monitoring    Hypertension    History of TIA    Idiopathic peripheral neuropathy  Essential tremors  Dysphonia  COPD without exacerbation  CKD  Malnutrition with BMI of 19  Hypothyroidism  Gout    Full code

## 2023-09-30 NOTE — PROGRESS NOTES
4 Eyes Skin Assessment     NAME:  Kirstin Miller  YOB: 1940  MEDICAL RECORD NUMBER:  [id number]    The patient is being assessed for  Admission    I agree that at least one RN has performed a thorough Head to Toe Skin Assessment on the patient. ALL assessment sites listed below have been assessed. Areas assessed by both nurses:    Head, Face, Ears, Shoulders, Back, Chest, Arms, Elbows, Hands, Sacrum. Buttock, Coccyx, Ischium, and Legs. Feet and Heels    -Scattered Bruising, Generalized        Does the Patient have a Wound?  No noted wound(s)       Conor Prevention initiated by RN: Yes  Wound Care Orders initiated by RN: No    Pressure Injury (Stage 3,4, Unstageable, DTI, NWPT, and Complex wounds) if present, place Wound referral order by RN under : No    New Ostomies, if present place, Ostomy referral order under : No     Nurse 1 eSignature: Electronically signed by Renuka Talamantes RN on 9/30/23 at 5:42 AM EDT      Nurse 2 eSignature: Electronically signed by Jose Martin Lezama LPN on [birth year] at 4:34 AM EDT

## 2023-09-30 NOTE — PROGRESS NOTES
[address] [facility] Department   Phone: [phone]    Physical Therapy    [x] Initial Evaluation            [] Daily Treatment Note         [] Discharge Summary      Patient: Rojas Hamm   :    MRN: [id number]   Date of Service:  2023  Admitting Diagnosis: Pancytopenia [facility])  Current Admission Summary: Rojas Hamm is a 80 y.o. female who presents because of weakness. She has recently been having medical work-up for a variety of abnormalities. She had cystoscopy procedure for hematuria on Tuesday of this week and then had a bone marrow biopsy done on Wednesday. Since then, she has been increasingly weak. Her daughter provides history and says that she has had moments of mild confusion and memory lapse. She is also been very weak today. She has not been able to ambulate independently because her legs are too shaky. Her oral intake has been decreased. The patient admits to pain in her arms and legs but says that she has arthritis and this is typical for that pain. She has been having right shoulder pain for over a month and has forgotten to mention to her doctors about the pain. She denies any specific injury. She has had pain across her upper back. Denies any cough or shortness of breath. Denies dysuria. Occasionally gets some abdominal pain.   Past Medical History:  has a past medical history of Allergic rhinitis, cause unspecified, Arthritis, Atrial fibrillation ([address]), Bronchopneumonia, CAD (coronary artery disease), Cerebral artery occlusion with cerebral infarction ([address]), Chronic gouty arthropathy, Chronic kidney disease, Congestive heart failure, unspecified, Degeneration of cervical intervertebral disc, Essential and other specified forms of tremor, Gout, HIGH CHOLESTEROL, History of CVA (cerebrovascular accident), Hx of blood clots, Hypertension, Influenza, Leakage of Watchman left atrial appendage closure device, Mitral valve stenosis and

## 2023-09-30 NOTE — PROGRESS NOTES
[address] [place] [address] Department   Phone: [phone]    Occupational Therapy    [x] Initial Evaluation            [] Daily Treatment Note         [] Discharge Summary      Patient: Roman Weldon   :    MRN: [id number]   Date of Service:  2023    Admitting Diagnosis:  Pancytopenia [facility])  Current Admission Summary: Roman Weldon is a 80 y.o. female who presents because of weakness. She has recently been having medical work-up for a variety of abnormalities. She had cystoscopy procedure for hematuria on Tuesday of this week and then had a bone marrow biopsy done on Wednesday. Since then, she has been increasingly weak. Her daughter provides history and says that she has had moments of mild confusion and memory lapse. She is also been very weak today. She has not been able to ambulate independently because her legs are too shaky. Her oral intake has been decreased. The patient admits to pain in her arms and legs but says that she has arthritis and this is typical for that pain. She has been having right shoulder pain for over a month and has forgotten to mention to her doctors about the pain. She denies any specific injury. She has had pain across her upper back. Denies any cough or shortness of breath. Denies dysuria. Occasionally gets some abdominal pain.   Past Medical History:  has a past medical history of Allergic rhinitis, cause unspecified, Arthritis, Atrial fibrillation ([address]), Bronchopneumonia, CAD (coronary artery disease), Cerebral artery occlusion with cerebral infarction ([address]), Chronic gouty arthropathy, Chronic kidney disease, Congestive heart failure, unspecified, Degeneration of cervical intervertebral disc, Essential and other specified forms of tremor, Gout, HIGH CHOLESTEROL, History of CVA (cerebrovascular accident), Hx of blood clots, Hypertension, Influenza, Leakage of Watchman left atrial appendage closure

## 2023-09-30 NOTE — PROGRESS NOTES
Assessment complete. VSS. Patient resting in bed. Respirations even and easy. Ambulated with RN to restroom. Reviewed POC, fluid restriction with patient and daughter. Both verbalized understanding. Call light in reach. Fall precautions in place. No needs expressed at this time.

## 2023-09-30 NOTE — CONSULTS
[facility]    Consult Note      Requesting Physician: The hospitalist    CHIEF COMPLAINT:  Weakness      HISTORY OF PRESENT ILLNESS:    Ms. Sinai Penaloza  is a 80 y.o. female we are seeing in consultation for Pancytopenia. Patient is a 80years old white female who was recently seen at clinic for pancytopenia. She had bone marrow aspiration and biopsy 3 days ago. The pathology is pending. She was admitted for confusion and weakness. Fever was not reported. ICD-10-CM    1. Generalized weakness  R53.1       2. Pancytopenia ([address])  D61.818       3.  Inability to walk  R26.2              Past Medical History:  Past Medical History:   Diagnosis Date    Allergic rhinitis, cause unspecified     Arthritis     Atrial fibrillation (HCC)     Bronchopneumonia     CAD (coronary artery disease)     stent:  post cataract surgery (CABG)    Cerebral artery occlusion with cerebral infarction [facility])     TIA    Chronic gouty arthropathy     Chronic kidney disease     Congestive heart failure, unspecified     Degeneration of cervical intervertebral disc     Essential and other specified forms of tremor     Gout     HIGH CHOLESTEROL     History of CVA (cerebrovascular accident)     Hx of blood clots     Hypertension     Influenza 12/23/2017    Leakage of Watchman left atrial appendage closure device     Mitral valve stenosis and aortic valve insufficiency     Movement disorder     back problems    Pacemaker     Peptic ulcer, unspecified site, unspecified as acute or chronic, without mention of hemorrhage, perforation, or obstruction     Thyroid disease     Unspecified disorder of kidney and ureter     Unspecified hypothyroidism        Past Surgical History:  Past Surgical History:   Procedure Laterality Date    BACK SURGERY      CARDIAC CATHETERIZATION  7/2012    CARDIAC CATHETERIZATION  08/07/2018 acute intracranial process identified. 2. Remote right frontal lobe infarct. 3. Remote left cerebellar infarct. 4. Mild chronic small vessel ischemic changes. XR SHOULDER RIGHT (MIN 2 VIEWS)  Narrative: EXAMINATION:  THREE XRAY VIEWS OF THE RIGHT SHOULDER    9/29/2023 8:53 pm    COMPARISON:  None. HISTORY:  ORDERING SYSTEM PROVIDED HISTORY: pain  TECHNOLOGIST PROVIDED HISTORY:  Reason for exam:->pain  Reason for Exam: Fatigue    FINDINGS:  No acute fractures or dislocations. The overlying soft tissues are  unremarkable. Mild degenerative changes of the shoulder joint. Impression: No acute fractures or dislocations of the right shoulder. XR CHEST PORTABLE  Narrative: EXAMINATION:  ONE XRAY VIEW OF THE CHEST    9/29/2023 8:52 pm    COMPARISON:  CXR dated 04/30/2023    HISTORY:  ORDERING SYSTEM PROVIDED HISTORY: weakness  TECHNOLOGIST PROVIDED HISTORY:  Reason for exam:->weakness  Reason for Exam: Fatigue    FINDINGS:  Medical devices: An intracardiac conduction device is present, in appropriate  position. Mediastinum/Heart: The mediastinal contours are unchanged compared to prior  exam.    Lungs: Atelectasis of the left lower lobe. Pleura: Small left pleural effusion. No pneumothorax. Impression: Small left pleural effusion with atelectasis of the left lower lobe, appears  mildly improved as compared to the prior study. The remainder of the chest  is unchanged.       Problem List  Patient Active Problem List   Diagnosis    Essential hypertension, benign    Mixed hyperlipidemia    Chronic gouty arthropathy    Acquired hypothyroidism    Non-rheumatic mitral regurgitation    COPD (chronic obstructive pulmonary disease) (HCC)    Restrictive lung disease    Sick sinus syndrome (HCC)    Allergic rhinitis    Fibrocystic breast    Cerebrovascular accident (CVA) ([address])    Pacemaker    PAT (paroxysmal atrial tachycardia) ([facility])    Primary osteoarthritis involving multiple joints    Vitamin D deficiency    Tremor

## 2023-10-01 VITALS
HEART RATE: 71 BPM | HEIGHT: 63 IN | SYSTOLIC BLOOD PRESSURE: 149 MMHG | DIASTOLIC BLOOD PRESSURE: 66 MMHG | RESPIRATION RATE: 16 BRPM | TEMPERATURE: 98 F | WEIGHT: 108.47 LBS | BODY MASS INDEX: 19.22 KG/M2 | OXYGEN SATURATION: 95 %

## 2023-10-01 LAB
ANION GAP SERPL CALCULATED.3IONS-SCNC: 9 MMOL/L (ref 3–16)
ANISOCYTOSIS BLD QL SMEAR: ABNORMAL
ANISOCYTOSIS BLD QL SMEAR: ABNORMAL
BASOPHILS # BLD: 0 K/UL (ref 0–0.2)
BASOPHILS NFR BLD: 0 %
BASOPHILS NFR BLD: 0 %
BASOPHILS NFR BLD: 1.3 %
BUN SERPL-MCNC: 32 MG/DL (ref 7–20)
BURR CELLS BLD QL SMEAR: ABNORMAL
CALCIUM SERPL-MCNC: 8.8 MG/DL (ref 8.3–10.6)
CHLORIDE SERPL-SCNC: 114 MMOL/L (ref 99–110)
CO2 SERPL-SCNC: 22 MMOL/L (ref 21–32)
CREAT SERPL-MCNC: 1.3 MG/DL (ref 0.6–1.2)
DACRYOCYTES BLD QL SMEAR: ABNORMAL
DEPRECATED RDW RBC AUTO: 24.9 % (ref 12.4–15.4)
DEPRECATED RDW RBC AUTO: 25.6 % (ref 12.4–15.4)
DEPRECATED RDW RBC AUTO: 25.6 % (ref 12.4–15.4)
EOSINOPHIL # BLD: 0 K/UL (ref 0–0.6)
EOSINOPHIL NFR BLD: 2 %
EOSINOPHIL NFR BLD: 3 %
EOSINOPHIL NFR BLD: 6 %
GFR SERPLBLD CREATININE-BSD FMLA CKD-EPI: 41 ML/MIN/{1.73_M2}
GLUCOSE SERPL-MCNC: 107 MG/DL (ref 70–99)
HCT VFR BLD AUTO: 22.9 % (ref 36–48)
HCT VFR BLD AUTO: 25.4 % (ref 36–48)
HCT VFR BLD AUTO: 27.7 % (ref 36–48)
HGB BLD-MCNC: 7.5 G/DL (ref 12–16)
HGB BLD-MCNC: 8.1 G/DL (ref 12–16)
HGB BLD-MCNC: 9 G/DL (ref 12–16)
LYMPHOCYTES # BLD: 0.3 K/UL (ref 1–5.1)
LYMPHOCYTES # BLD: 0.3 K/UL (ref 1–5.1)
LYMPHOCYTES # BLD: 0.4 K/UL (ref 1–5.1)
LYMPHOCYTES NFR BLD: 37 %
LYMPHOCYTES NFR BLD: 38 %
LYMPHOCYTES NFR BLD: 39.9 %
MACROCYTES BLD QL SMEAR: ABNORMAL
MCH RBC QN AUTO: 28.1 PG (ref 26–34)
MCH RBC QN AUTO: 28.4 PG (ref 26–34)
MCH RBC QN AUTO: 28.5 PG (ref 26–34)
MCHC RBC AUTO-ENTMCNC: 32.1 G/DL (ref 31–36)
MCHC RBC AUTO-ENTMCNC: 32.4 G/DL (ref 31–36)
MCHC RBC AUTO-ENTMCNC: 32.7 G/DL (ref 31–36)
MCV RBC AUTO: 86.9 FL (ref 80–100)
MCV RBC AUTO: 87.6 FL (ref 80–100)
MCV RBC AUTO: 88 FL (ref 80–100)
MICROCYTES BLD QL SMEAR: ABNORMAL
MONOCYTES # BLD: 0 K/UL (ref 0–1.3)
MONOCYTES NFR BLD: 2 %
MONOCYTES NFR BLD: 3 %
MONOCYTES NFR BLD: 5.9 %
NEUTROPHILS # BLD: 0.4 K/UL (ref 1.7–7.7)
NEUTROPHILS # BLD: 0.4 K/UL (ref 1.7–7.7)
NEUTROPHILS # BLD: 0.6 K/UL (ref 1.7–7.7)
NEUTROPHILS NFR BLD: 49.9 %
NEUTROPHILS NFR BLD: 53 %
NEUTROPHILS NFR BLD: 54 %
NEUTS BAND NFR BLD MANUAL: 5 % (ref 0–7)
OVALOCYTES BLD QL SMEAR: ABNORMAL
OVALOCYTES BLD QL SMEAR: ABNORMAL
PATH INTERP BLD-IMP: NO
PATH INTERP BLD-IMP: NO
PATH INTERP BLD-IMP: YES
PLATELET # BLD AUTO: 115 K/UL (ref 135–450)
PLATELET # BLD AUTO: 126 K/UL (ref 135–450)
PLATELET # BLD AUTO: 94 K/UL (ref 135–450)
PLATELET BLD QL SMEAR: ABNORMAL
PMV BLD AUTO: 10.2 FL (ref 5–10.5)
PMV BLD AUTO: 9.5 FL (ref 5–10.5)
PMV BLD AUTO: 9.7 FL (ref 5–10.5)
POLYCHROMASIA BLD QL SMEAR: ABNORMAL
POLYCHROMASIA BLD QL SMEAR: ABNORMAL
POTASSIUM SERPL-SCNC: 4 MMOL/L (ref 3.5–5.1)
RBC # BLD AUTO: 2.64 M/UL (ref 4–5.2)
RBC # BLD AUTO: 2.9 M/UL (ref 4–5.2)
RBC # BLD AUTO: 3.15 M/UL (ref 4–5.2)
SCHISTOCYTES BLD QL SMEAR: ABNORMAL
SCHISTOCYTES BLD QL SMEAR: ABNORMAL
SLIDE REVIEW: ABNORMAL
SODIUM SERPL-SCNC: 145 MMOL/L (ref 136–145)
TSH SERPL DL<=0.005 MIU/L-ACNC: 0.2 UIU/ML (ref 0.27–4.2)
URATE SERPL-MCNC: 5.5 MG/DL (ref 2.6–6)
WBC # BLD AUTO: 0.8 K/UL (ref 4–11)
WBC # BLD AUTO: 0.8 K/UL (ref 4–11)
WBC # BLD AUTO: 1.1 K/UL (ref 4–11)

## 2023-10-01 PROCEDURE — 84550 ASSAY OF BLOOD/URIC ACID: CPT

## 2023-10-01 PROCEDURE — 85025 COMPLETE CBC W/AUTO DIFF WBC: CPT

## 2023-10-01 PROCEDURE — 6370000000 HC RX 637 (ALT 250 FOR IP): Performed by: PEDIATRICS

## 2023-10-01 PROCEDURE — 36415 COLL VENOUS BLD VENIPUNCTURE: CPT

## 2023-10-01 PROCEDURE — 80048 BASIC METABOLIC PNL TOTAL CA: CPT

## 2023-10-01 PROCEDURE — 6360000002 HC RX W HCPCS: Performed by: PEDIATRICS

## 2023-10-01 PROCEDURE — 2580000003 HC RX 258: Performed by: PEDIATRICS

## 2023-10-01 RX ADMIN — LOSARTAN POTASSIUM 12.5 MG: 25 TABLET, FILM COATED ORAL at 08:43

## 2023-10-01 RX ADMIN — Medication 10 ML: at 08:43

## 2023-10-01 RX ADMIN — TORSEMIDE 10 MG: 20 TABLET ORAL at 08:43

## 2023-10-01 RX ADMIN — TOPIRAMATE 50 MG: 25 TABLET, FILM COATED ORAL at 08:43

## 2023-10-01 RX ADMIN — HEPARIN SODIUM 5000 UNITS: 5000 INJECTION INTRAVENOUS; SUBCUTANEOUS at 10:54

## 2023-10-01 RX ADMIN — ASPIRIN 81 MG: 81 TABLET, COATED ORAL at 08:43

## 2023-10-01 RX ADMIN — LEVOTHYROXINE SODIUM 112 MCG: 0.11 TABLET ORAL at 05:20

## 2023-10-01 RX ADMIN — ALLOPURINOL 100 MG: 100 TABLET ORAL at 08:43

## 2023-10-01 NOTE — PLAN OF CARE
Problem: Safety - Adult  Goal: Free from fall injury  10/1/2023 0444 by Li Hernandez RN  Outcome: Progressing  9/30/2023 2109 by Li Hernandez RN  Outcome: Progressing     Problem: Skin/Tissue Integrity  Goal: Absence of new skin breakdown  Description: 1. Monitor for areas of redness and/or skin breakdown  2. Assess vascular access sites hourly  3. Every 4-6 hours minimum:  Change oxygen saturation probe site  4. Every 4-6 hours:  If on nasal continuous positive airway pressure, respiratory therapy assess nares and determine need for appliance change or resting period.   10/1/2023 0444 by Li Hernandez RN  Outcome: Progressing  9/30/2023 2109 by Li Hernandez RN  Outcome: Progressing     Problem: Cardiovascular - Adult  Goal: Maintains optimal cardiac output and hemodynamic stability  10/1/2023 0444 by Li Hernandez RN  Outcome: Progressing  9/30/2023 2109 by Li Hernandez RN  Outcome: Progressing     Problem: Skin/Tissue Integrity - Adult  Goal: Skin integrity remains intact  10/1/2023 0444 by Li Hernandez RN  Outcome: Progressing  9/30/2023 2109 by Li Hernandez RN  Outcome: Progressing     Problem: Musculoskeletal - Adult  Goal: Return mobility to safest level of function  10/1/2023 0444 by Li Hernandez RN  Outcome: Progressing  9/30/2023 2109 by Li Hernandez RN  Outcome: Progressing     Problem: Metabolic/Fluid and Electrolytes - Adult  Goal: Hemodynamic stability and optimal renal function maintained  10/1/2023 0444 by Li Hernandez RN  Outcome: Progressing  9/30/2023 2109 by Li Hernandez RN  Outcome: Progressing     Problem: Hematologic - Adult  Goal: Maintains hematologic stability  10/1/2023 0444 by Li Hernandez RN  Outcome: Progressing  9/30/2023 2109 by Li Hernandez RN  Outcome: Progressing     Problem: Gastrointestinal - Adult  Goal: Maintains or returns to baseline bowel function  10/1/2023 0444 by Li Hernandez RN  Outcome: Progressing  9/30/2023 2109 by Byron Guerrero Kandy Morris RN  Outcome: Progressing     Problem: Chronic Conditions and Co-morbidities  Goal: Patient's chronic conditions and co-morbidity symptoms are monitored and maintained or improved  10/1/2023 0444 by Trevon Olivares RN  Outcome: Progressing  9/30/2023 2109 by Trevon Olivares RN  Outcome: Progressing     Problem: Pain  Goal: Verbalizes/displays adequate comfort level or baseline comfort level  10/1/2023 0444 by Trevon Olivares RN  Outcome: Progressing  9/30/2023 2109 by Trevon Olivares RN  Outcome: Progressing

## 2023-10-01 NOTE — DISCHARGE INSTR - COC
Continuity of Care Form    Patient Name: Rod Ortiz   :    MRN:  [id number]    Admit date:  2023  Discharge date:  10/1/2023    Code Status Order: Full Code   Advance Directives:     Admitting Physician:  Reatha Burkitt, MD  PCP: Heidi Gallardo MD    Discharging Nurse: Sunny Tirado [facility] Unit/Room#: 5GL-9249/0097-37  Discharging Unit Phone Number: [phone]    Emergency Contact:   Extended Emergency Contact Information  Primary Emergency Contact: Sancho Sheridan  [address] [address] [facility] [address] [address] Phone: [phone]  Mobile Phone: [phone]  Relation: Child  Secondary Emergency Contact: [employer] [address] Phone: [phone]  Relation: Child    Past Surgical History:  Past Surgical History:   Procedure Laterality Date    BACK SURGERY      CARDIAC CATHETERIZATION  2012    CARDIAC CATHETERIZATION  2018    Unsuccesful  of RCA    CARDIAC SURGERY      CABG & Cardiac ablation    CHOLECYSTECTOMY      CORONARY ARTERY BYPASS GRAFT  1987    LIMA- Diag/LAD, SVG- RCA    FEMORAL BYPASS Left 2019    LEFT FEMORAL TO POPLITEAL BYPASS GRAFT performed by Rolly Morales MD at [facility] N/A 2019    FEMORAL TO FEMORAL BYPASS performed by Rolly Morales MD at [address] [place] [address] Left 2017    Left hip pinning    IR KYPHOPLASTY LUMBAR FIRST LEVEL  2021    IR KYPHOPLASTY LUMBAR FIRST LEVEL 2021 [facility] SPECIAL PROCEDURES    JOINT REPLACEMENT      PA NJX AA&/STRD TFRML EPI LUMBAR/SACRAL 1 LEVEL Right 12/3/2018    RIGHT L3 AND L4 LUMBAR TRANSFORAMINAL EPIRUAL STEROID INJECTION WITH FLUOROSCOPY performed by [facility] Melendez [facility] at [facility]    PTCA  2014    MARLENY - 3.0 x 28 to the Ist Diag    SHOULDER SURGERY      left       Immunization History:   Immunization History   Administered Date(s) Administered    COVID-19, MODERNA BLUE border, Primary or Immunocompromised, Elimination:  Continence: Bowel: Yes  Bladder: Yes  Urinary Catheter: None   Colostomy/Ileostomy/Ileal Conduit: No       Date of Last BM: 09/30/23    Intake/Output Summary (Last 24 hours) at 10/1/2023 1429  Last data filed at 10/1/2023 0652  Gross per 24 hour   Intake 740 ml   Output 800 ml   Net -60 ml     I/O last 3 completed shifts: In: 750 [P.O.:740; I.V.:10]  Out: 1300 [Urine:1300]    Safety Concerns: At Risk for Falls    Impairments/Disabilities:      Hearing    Nutrition Therapy:  Current Nutrition Therapy:   - Oral Diet:  General    Routes of Feeding: Oral  Liquids: Thin Liquids  Daily Fluid Restriction: no  Last Modified Barium Swallow with Video (Video Swallowing Test): not done    Treatments at the Time of Hospital Discharge:   Respiratory Treatments: NA  Oxygen Therapy:  is not on home oxygen therapy.   Ventilator:    - No ventilator support    Rehab Therapies: Physical Therapy and Occupational Therapy  Weight Bearing Status/Restrictions: No weight bearing restrictions  Other Medical Equipment (for information only, NOT a DME order):  walker  Other Treatments: NA    Patient's personal belongings (please select all that are sent with patient):  None    RN SIGNATURE:  Electronically signed by Glynn Betancur RN on 10/1/23 at 4:11 PM EDT    CASE MANAGEMENT/SOCIAL WORK SECTION    Inpatient Status Date: 9/29/2023    Readmission Risk Assessment Score:23%  Readmission Risk              Risk of Unplanned Readmission:  23           Discharging to Facility/ Agency   Name: [address] [place] [address]  Address:  Phone:  Fax:      / signature: Electronically signed by BRIANNA Lamb on 10/1/2023 at 3:59 PM     PHYSICIAN SECTION    Prognosis: Good    Condition at Discharge: Stable    Rehab Potential (if transferring to Rehab): Good    Recommended Labs or Other Treatments After Discharge: none     Physician Certification: I certify the above information and transfer of

## 2023-10-01 NOTE — PLAN OF CARE
Problem: Safety - Adult  Goal: Free from fall injury  10/1/2023 1606 by Duane Clemente RN  Outcome: Completed  10/1/2023 0444 by Maxi Lara RN  Outcome: Progressing     Problem: Skin/Tissue Integrity  Goal: Absence of new skin breakdown  Description: 1. Monitor for areas of redness and/or skin breakdown  2. Assess vascular access sites hourly  3. Every 4-6 hours minimum:  Change oxygen saturation probe site  4. Every 4-6 hours:  If on nasal continuous positive airway pressure, respiratory therapy assess nares and determine need for appliance change or resting period.   10/1/2023 1606 by Duane Clemente RN  Outcome: Completed  10/1/2023 0444 by Maxi Lara RN  Outcome: Progressing     Problem: Cardiovascular - Adult  Goal: Maintains optimal cardiac output and hemodynamic stability  10/1/2023 1606 by Duane Clemente RN  Outcome: Completed  10/1/2023 0444 by Maxi Lara RN  Outcome: Progressing     Problem: Skin/Tissue Integrity - Adult  Goal: Skin integrity remains intact  10/1/2023 1606 by Daune Clemente RN  Outcome: Completed  10/1/2023 0444 by Maxi Lara RN  Outcome: Progressing     Problem: Musculoskeletal - Adult  Goal: Return mobility to safest level of function  10/1/2023 1606 by Duane Clemente RN  Outcome: Completed  10/1/2023 0444 by Maxi Lara RN  Outcome: Progressing     Problem: Metabolic/Fluid and Electrolytes - Adult  Goal: Hemodynamic stability and optimal renal function maintained  10/1/2023 1606 by Duane Clemente RN  Outcome: Completed  10/1/2023 0444 by Maxi Lara RN  Outcome: Progressing     Problem: Hematologic - Adult  Goal: Maintains hematologic stability  10/1/2023 1606 by Duane Clemente RN  Outcome: Completed  10/1/2023 0444 by Maxi Lara RN  Outcome: Progressing     Problem: Gastrointestinal - Adult  Goal: Maintains or returns to baseline bowel function  10/1/2023 1606 by Duane Clemente RN  Outcome: Completed  10/1/2023 0444 by Maxi Lara RN  Outcome: Progressing     Problem: Chronic Conditions and Co-morbidities  Goal: Patient's chronic conditions and co-morbidity symptoms are monitored and maintained or improved  10/1/2023 1606 by Robel Lewis RN  Outcome: Completed  10/1/2023 0444 by Trevon Olivares RN  Outcome: Progressing     Problem: Pain  Goal: Verbalizes/displays adequate comfort level or baseline comfort level  10/1/2023 1606 by Robel Lewis RN  Outcome: Completed  10/1/2023 0444 by Trevon Olivares RN  Outcome: Progressing

## 2023-10-01 NOTE — CARE COORDINATION
ANTICOAGULATION MANAGEMENT     Rocío Catherine 26 year old female is on warfarin with therapeutic INR result. (Goal INR 2.0-3.0)    Recent labs: (last 7 days)     08/16/21  1445   INR 2.3*       ASSESSMENT     Source(s): Chart Review and Patient/Caregiver Call       Warfarin doses taken: Warfarin taken as instructed    Diet: No new diet changes identified    New illness, injury, or hospitalization: No    Medication/supplement changes: None noted    Signs or symptoms of bleeding or clotting: No    Previous INR: Therapeutic last 2(+) visits    Additional findings: None     PLAN     Recommended plan for no diet, medication or health factor changes affecting INR     Dosing Instructions: Continue your current warfarin dose with next INR in 4 weeks       Summary  As of 8/16/2021    Full warfarin instructions:  10 mg every Mon; 7.5 mg all other days   Next INR check:  9/13/2021             Telephone call with Rocío who verbalizes understanding and agrees to plan    Lab visit scheduled    Education provided: Contact [phone]  with any changes, questions or concerns.     Plan made per [facility] anticoagulation protocol    Sarah Felipe RN  Anticoagulation Clinic  8/16/2021    _______________________________________________________________________     Anticoagulation Episode Summary     Current INR goal:  2.0-3.0   TTR:  54.5 % (1 y)   Target end date:  Indefinite   Send INR reminders to:  [facility]    Indications    Acute pulmonary embolism  unspecified pulmonary embolism type  unspecified whether acute cor pulmonale present (H) [I26.99]  Factor 5 Leiden mutation  heterozygous (H) [D68.51]  Other acute pulmonary embolism without acute cor pulmonale (H) (Resolved) [I26.99]  Acute pulmonary embolism  unspecified pulmonary embolism type  unspecified whether acute cor pulmonale present (H) (Resolved) [I26.99]  Antiphospholipid syndrome (H) [D68.61]           Comments:  *          Anticoagulation Care Providers      PT/OT recommending that patient discharge with home health care services. Patient had [address] in the past and would like them again. SW/CM referred patient to [address] [place] [address] and provider accepted patient.     Electronically signed by BRIANNA Wilburn on 10/1/2023 at 3:57 PM Provider Role Specialty Phone number    Jesse Farnsworth MD Referring Family Medicine [phone]    Yessy Lyn APRN CNP Referring Family Medicine [phone]

## 2023-10-01 NOTE — PLAN OF CARE
Problem: Safety - Adult  Goal: Free from fall injury  9/30/2023 2109 by Maxi Lara RN  Outcome: Progressing  9/30/2023 1334 by Duane Clemente RN  Outcome: Progressing     Problem: Skin/Tissue Integrity  Goal: Absence of new skin breakdown  Description: 1. Monitor for areas of redness and/or skin breakdown  2. Assess vascular access sites hourly  3. Every 4-6 hours minimum:  Change oxygen saturation probe site  4. Every 4-6 hours:  If on nasal continuous positive airway pressure, respiratory therapy assess nares and determine need for appliance change or resting period.   9/30/2023 2109 by Maxi Lara RN  Outcome: Progressing  9/30/2023 1334 by Duane Clemente RN  Outcome: Progressing     Problem: Cardiovascular - Adult  Goal: Maintains optimal cardiac output and hemodynamic stability  9/30/2023 2109 by Maxi Lara RN  Outcome: Progressing  9/30/2023 1334 by Duane Clemente RN  Outcome: Progressing     Problem: Skin/Tissue Integrity - Adult  Goal: Skin integrity remains intact  9/30/2023 2109 by Maxi Lara RN  Outcome: Progressing  9/30/2023 1334 by Dunae Clemente RN  Outcome: Progressing     Problem: Musculoskeletal - Adult  Goal: Return mobility to safest level of function  9/30/2023 2109 by Maxi Lara RN  Outcome: Progressing  9/30/2023 1334 by Duane Clemente RN  Outcome: Progressing     Problem: Metabolic/Fluid and Electrolytes - Adult  Goal: Hemodynamic stability and optimal renal function maintained  9/30/2023 2109 by Maxi Lara RN  Outcome: Progressing  9/30/2023 1334 by Duane Clemente RN  Outcome: Progressing     Problem: Hematologic - Adult  Goal: Maintains hematologic stability  9/30/2023 2109 by Maxi Lara RN  Outcome: Progressing  9/30/2023 1334 by Duane Clemente RN  Outcome: Progressing     Problem: Gastrointestinal - Adult  Goal: Maintains or returns to baseline bowel function  9/30/2023 2109 by Maxi Lara RN  Outcome: Progressing  9/30/2023 1334 by Nayana Cunha Vital signs:  Temp: 96.7  F (35.9  C) Temp src: Oral BP: 124/60 Heart Rate: 63 Resp: 16 SpO2: 96 % O2 Device: Nasal cannula Oxygen Delivery: 1 LPM     Time: Pt arrived around 0130 from [facility].   Status: POD #1 s/p bilateral simple mastectomy, nipple grafts.  Neuros: A&Ox4, calm. Neuro and CMS intact.   Cardiac: WDL.   Respiratory: Sats 96-99% on 1L NC. LS clear, denies SOB.   Pain: Incisional pain well managed overnight with prn oxycodone 5 mg and on Q pump.   Nausea: Denies nausea/emesis overnight.   Diet: Tolerating sips of water overnight.   GI/: Voided x1. Hypo BS, no BM.   Skin/Incisions: Incisional dressing CDI.   Drains: RIOS drains x2 and on Q pump.   Lines: L hand PIV SL.   Labs: Reviewed.   Activity: Up ambulated to bathroom with A1.   New Changes this Shift: None, pt slept well overnight, no acute changes.   Plan: possible discharge today. Continue POC.      Macy, RN  Outcome: Progressing     Problem: Chronic Conditions and Co-morbidities  Goal: Patient's chronic conditions and co-morbidity symptoms are monitored and maintained or improved  Outcome: Progressing     Problem: Pain  Goal: Verbalizes/displays adequate comfort level or baseline comfort level  Outcome: Progressing

## 2023-10-01 NOTE — PROGRESS NOTES
Night shift assessment completed. Routine vitals have been obtained. Scheduled medications given. Patient c/o neck pain prn pain medication given with routine meds, hs heparin held r/t order parameters not being met. Patient is awake, alert and oriented/ talking to staff. Respirations are easy and unlabored with no c/o SOB. Skin has been assessed per writer. IV site is C/D/I. Patient does not appear to be in distress. Call light within reach. Standard safety measures are in place. All needs have been met at this time.

## 2023-10-01 NOTE — PROGRESS NOTES
AVS reviewed with patient and daughter. Reviewed neutropenic precautions to continue at home. Both verbalized understanding. PIV removed without complications.

## 2023-10-01 NOTE — CARE COORDINATION
Case Management Assessment  Initial Evaluation    Date/Time of Evaluation: 10/1/2023 3:47 PM  Assessment Completed by: BRIANNA Quinn    If patient is discharged prior to next notation, then this note serves as note for discharge by case management. Patient Name: Charly Davis                   YOB: 1940  Diagnosis: Generalized weakness [R53.1]  Inability to walk [R26.2]  Pancytopenia [facility]) [D61.818]                   Date / Time: 9/29/2023  7:54 PM    Patient Admission Status: Inpatient   Readmission Risk (Low < 19, Mod (19-27), High > 27): Readmission Risk Score: 22.6    Current PCP: Soo Garcia MD  PCP verified by CM? Yes    Chart Reviewed: Yes      History Provided by: Patient, Child/Family (Patient's daughter)  Patient Orientation: Person, Place, Situation, Self    Patient Cognition: Alert    Hospitalization in the last 30 days (Readmission):  No    If yes, Readmission Assessment in  Navigator will be completed.     Advance Directives:      Code Status: Full Code   Patient's Primary Decision Maker is: Legal Next of Kin    Primary Decision MakerFchadmichaellauro Alvarez - Child - [phone]    Secondary Decision Maker: Iliana Mohanrosanna - Child - [phone]    Supplemental (Other) Decision Maker: Gael Barahona - Child - [phone]    Discharge Planning:    Patient lives with: Family Members Type of Home: House  Primary Care Giver: Family (Patient's daughter)  Patient Support Systems include: Children, Family Members   Current Financial resources: Medicare  Current community resources: None  Current services prior to admission: None            Current DME:              Type of Home Care services:  OT, PT, Nursing Services    ADLS  Prior functional level: Assistance with the following:, Cooking, Housework, Shopping  Current functional level: Assistance with the following:, Cooking, Housework, Shopping, Mobility    PT AM-PAC: 18 /24  OT AM-PAC: 19 /24    Family can provide assistance at DC: Yes  Would you like Case Management to discuss the discharge plan with any other family members/significant others, and if so, who? Yes (Children)  Plans to Return to Present Housing: Yes  Other Identified Issues/Barriers to RETURNING to current housing: None  Potential Assistance needed at discharge: Home Care            Potential DME:    Patient expects to discharge to: [address] [facility] for transportation at discharge:      Financial    Payor: [address] / Plan: MEDICARE PART A AND B / Product Type: *No Product type* /     Does insurance require precert for SNF: No    Potential assistance Purchasing Medications: No  Meds-to-Beds request:        Yana Wise [id number] Radha Spire, [address] [phone] [facility] [phone]  [facility] [facility]  Phone: [phone] Fax: [phone]    [facility] Mail Delivery - [place] [address]  [address] [place] [address]  Phone: [phone] Fax: [phone]      Notes:    Factors facilitating achievement of predicted outcomes: Family support, Caregiver support, Motivated, Cooperative, Pleasant, and Has needed Durable Medical Equipment at home    Barriers to discharge: Limited safety awareness and Decreased endurance    Additional Case Management Notes: Patient was referred to [facility] on her last admission. Patient/Family requesting that patient be referred to [facility] again. SW/CM contacted Radha Girard, Admission Director for [facility]. Radha Girard stated that provider will accept patient. The Plan for Transition of Care is related to the following treatment goals of Generalized weakness [R53.1]  Inability to walk [R26.2]  Pancytopenia ([address]) [Y62.663]    IF APPLICABLE: The Patient and/or patient representative Renetta Bejarano and her family were provided with a choice of provider and agrees with the discharge plan.  Freedom of

## 2023-10-01 NOTE — DISCHARGE SUMMARY
Patient: Didier Newton     Gender: female  : 1940   Age: 80 y.o. MRN: [id number]    Admitting Physician: Carlos Tay MD  Discharge Physician: Taylor Herrera MD     Code Status: Full Code     Admit Date: 2023   Discharge Date:   10/1/2023    Disposition:  Home    Discharge Diagnoses:  Leukopenia and anemia suspect MDS counts stable  Thank systolic heart failure      Active Hospital Problems    Diagnosis Date Noted    Pancytopenia [facility]) [B44.715] 2023       Follow-up appointments:  one week    Outpatient to do list: Follow-up with hematology for bone marrow biopsy result    Condition at Discharge:  Stable    Hospital Course:   80years old female presented with progressive weakness, note patient recently underwent bone marrow biopsy for leukopenia/pancytopenia as outpatient, results pending     Pancytopenia with back and shoulder pain. ANC of 400 this morning. Status post recent bone marrow biopsy on 2023. Oncology consulted on admission. Neutropenic precautions  Patient was monitored for over 48 hours did well white cell count somewhat improved to 1 with an ANC of 500 platelet counts much improved hemoglobin up to 9 no further symptoms seen by hematology who recommended that she be discharged home some question of an altered mental status however urine blood cultures do remain negative she was awake alert oriented with no focal neurology    Discharge Medications:   Current Discharge Medication List        Current Discharge Medication List        Current Discharge Medication List        CONTINUE these medications which have NOT CHANGED    Details   HYDROcodone-acetaminophen (NORCO) 5-325 MG per tablet Take 1 tablet by mouth 4 times daily as needed for Pain for up to 30 days.   Qty: 120 tablet, Refills: 0    Comments: Reduce doses taken as pain becomes manageable  Associated Diagnoses: Primary osteoarthritis involving multiple joints      losartan (COZAAR) 25 MG tablet Take 0.5 bowel sounds present, no masses  Musculoskeletal:  No clubbing, no cyanosis, *  Neurologic:  Neurovascularly intact without any focal sensory/motor deficits. Cranial nerves: II-XII intact, grossly non-focal.    Labs: For convenience and continuity at follow-up the following most recent labs are provided:    Lab Results   Component Value Date/Time    WBC 0.8 10/01/2023 06:56 AM    HGB 8.1 10/01/2023 06:56 AM    HCT 25.4 10/01/2023 06:56 AM    MCV 87.6 10/01/2023 06:56 AM     10/01/2023 06:56 AM     10/01/2023 06:56 AM    K 4.0 10/01/2023 06:56 AM    K 3.6 09/30/2023 06:38 AM     10/01/2023 06:56 AM    CO2 22 10/01/2023 06:56 AM    BUN 32 10/01/2023 06:56 AM    CREATININE 1.3 10/01/2023 06:56 AM    CALCIUM 8.8 10/01/2023 06:56 AM    PHOS 3.4 09/26/2023 09:26 AM     06/08/2022 02:37 PM    ALKPHOS 104 09/29/2023 08:26 PM    ALT 12 09/29/2023 08:26 PM    AST 20 09/29/2023 08:26 PM    BILITOT 0.3 09/29/2023 08:26 PM    BILITOT 0.4 07/17/2019 10:46 AM    BILIDIR <0.2 04/24/2019 09:40 AM    LABALBU 3.7 09/29/2023 08:26 PM    LDLCALC 63 01/23/2022 04:22 AM    TRIG 227 01/23/2022 04:22 AM     Lab Results   Component Value Date    INR 1.27 (H) 11/12/2022    INR 1.19 (H) 01/22/2022    INR 1.01 05/14/2021           The patient was seen and examined on day of discharge and this discharge summary is in conjunction with any daily progress note from day of discharge. Time Spent on discharge is 45 minutes  in the examination, evaluation, counseling and review of medications and discharge plan. Note that greater  than 30 minutes was spent in preparing discharge papers, discussing discharge with patient, medication review, etc.       Signed:    Dayana Joiner MD   10/1/2023      Thank you Rick Last MD for the opportunity to be involved in this patient's care.  If you have any questions or concerns please feel free to contact me

## 2023-10-02 LAB
BACTERIA UR CULT: ABNORMAL
ORGANISM: ABNORMAL
PATH INTERP BLD-IMP: NORMAL

## 2023-10-03 ENCOUNTER — CLINICAL DOCUMENTATION ONLY (OUTPATIENT)
Facility: CLINIC | Age: 83
End: 2023-10-03

## 2023-10-04 ENCOUNTER — TELEPHONE (OUTPATIENT)
Dept: FAMILY MEDICINE CLINIC | Age: 83
End: 2023-10-04

## 2023-10-04 DIAGNOSIS — N30.00 ACUTE CYSTITIS WITHOUT HEMATURIA: Primary | ICD-10-CM

## 2023-10-04 LAB
BACTERIA BLD CULT ORG #2: NORMAL
BACTERIA BLD CULT: NORMAL

## 2023-10-04 RX ORDER — CIPROFLOXACIN 250 MG/1
250 TABLET, FILM COATED ORAL 2 TIMES DAILY
Qty: 14 TABLET | Refills: 0 | Status: SHIPPED | OUTPATIENT
Start: 2023-10-04 | End: 2023-10-11

## 2023-10-04 NOTE — TELEPHONE ENCOUNTER
Patient was recently in the hospital, was told she had a UTI, but when discharged they did not send her home on any ATB and she is c/o pain/discomfort with urination.       [address] [address] [address] [facility]

## 2023-10-05 ENCOUNTER — TELEPHONE (OUTPATIENT)
Dept: FAMILY MEDICINE CLINIC | Age: 83
End: 2023-10-05

## 2023-10-05 NOTE — TELEPHONE ENCOUNTER
Care Transitions Initial Follow Up Call    Outreach made within 2 business days of discharge: Yes    Patient: Julienne Soliman Patient : 1940   MRN: [id number]  Reason for Admission: weakness  Discharge Date: 10/1/23       Spoke with: Called but unable to leave message, Pt phone sounds like static. Called back and got same issue.         Scheduled appointment with PCP within 7-14 days    Follow Up  Future Appointments   Date Time Provider [address]   10/24/2023  1:30 PM TIFFANIE Graham - CNS [place] CARDIO [facility]   2023 10:00 AM Clare Ellis MD PULM & CC [facility]   2023  1:15 PM Riri Guidry MD [address] [place] [address]   2023 10:30 AM TIFFANIE Morales - CNP FF Cardio [facility]   2023 11:45 AM Rasheed Alfred MD AFL NASO AFL NASO       Ayse Isabel LPN

## 2023-10-10 ENCOUNTER — TELEPHONE (OUTPATIENT)
Dept: FAMILY MEDICINE CLINIC | Age: 83
End: 2023-10-10

## 2023-10-10 NOTE — TELEPHONE ENCOUNTER
Per the pharmacy the medication Cipro was order per Dr. Nayely Wilson on 10/4/23 for the patient and it has an interaction with ondansetron [facility]) injection 4 mg   [[id number]. Please give the pharmacy a call with clarification on the order. The call back number is [phone].      Please advise

## 2023-10-10 NOTE — TELEPHONE ENCOUNTER
Would recommend that she simply not take the Zofran medication while she is taking antibiotic therapy

## 2023-10-24 ENCOUNTER — OFFICE VISIT (OUTPATIENT)
Dept: CARDIOLOGY CLINIC | Age: 83
End: 2023-10-24
Payer: MEDICARE

## 2023-10-24 VITALS
OXYGEN SATURATION: 100 % | HEIGHT: 63 IN | DIASTOLIC BLOOD PRESSURE: 60 MMHG | HEART RATE: 55 BPM | SYSTOLIC BLOOD PRESSURE: 132 MMHG | BODY MASS INDEX: 18.43 KG/M2 | WEIGHT: 104 LBS

## 2023-10-24 DIAGNOSIS — I48.20 CHRONIC ATRIAL FIBRILLATION (HCC): ICD-10-CM

## 2023-10-24 DIAGNOSIS — I25.83 CORONARY ARTERY DISEASE DUE TO LIPID RICH PLAQUE: ICD-10-CM

## 2023-10-24 DIAGNOSIS — I50.22 CHRONIC SYSTOLIC HEART FAILURE (HCC): Primary | ICD-10-CM

## 2023-10-24 DIAGNOSIS — I25.10 CORONARY ARTERY DISEASE DUE TO LIPID RICH PLAQUE: ICD-10-CM

## 2023-10-24 DIAGNOSIS — N28.9 RENAL INSUFFICIENCY: ICD-10-CM

## 2023-10-24 PROCEDURE — G8399 PT W/DXA RESULTS DOCUMENT: HCPCS | Performed by: CLINICAL NURSE SPECIALIST

## 2023-10-24 PROCEDURE — G8419 CALC BMI OUT NRM PARAM NOF/U: HCPCS | Performed by: CLINICAL NURSE SPECIALIST

## 2023-10-24 PROCEDURE — 1090F PRES/ABSN URINE INCON ASSESS: CPT | Performed by: CLINICAL NURSE SPECIALIST

## 2023-10-24 PROCEDURE — 1036F TOBACCO NON-USER: CPT | Performed by: CLINICAL NURSE SPECIALIST

## 2023-10-24 PROCEDURE — 1111F DSCHRG MED/CURRENT MED MERGE: CPT | Performed by: CLINICAL NURSE SPECIALIST

## 2023-10-24 PROCEDURE — 3078F DIAST BP <80 MM HG: CPT | Performed by: CLINICAL NURSE SPECIALIST

## 2023-10-24 PROCEDURE — G8427 DOCREV CUR MEDS BY ELIG CLIN: HCPCS | Performed by: CLINICAL NURSE SPECIALIST

## 2023-10-24 PROCEDURE — 3075F SYST BP GE 130 - 139MM HG: CPT | Performed by: CLINICAL NURSE SPECIALIST

## 2023-10-24 PROCEDURE — 1123F ACP DISCUSS/DSCN MKR DOCD: CPT | Performed by: CLINICAL NURSE SPECIALIST

## 2023-10-24 PROCEDURE — 99214 OFFICE O/P EST MOD 30 MIN: CPT | Performed by: CLINICAL NURSE SPECIALIST

## 2023-10-24 PROCEDURE — G8484 FLU IMMUNIZE NO ADMIN: HCPCS | Performed by: CLINICAL NURSE SPECIALIST

## 2023-10-24 RX ORDER — TORSEMIDE 20 MG/1
10 TABLET ORAL EVERY OTHER DAY
Qty: 60 TABLET | Refills: 0 | Status: SHIPPED
Start: 2023-10-24

## 2023-10-24 NOTE — PATIENT INSTRUCTIONS
You must eat more protein, milkshakes and protein drinks  Cut torsemide to 10 mg every other day  Continue all other medications  RTO in 5 months

## 2023-10-24 NOTE — PROGRESS NOTES
ventricle - normal size, thickness, reduced function with EF of 45%  LV wall motion - diffuse hypokinesis. Mitral valve - thickened, annular calcification, moderate regurgitation  Aortic valve - thickened, calcified, mild regurgitation  Tricuspid valve - mild regurgitation with PASP of 25mmHg  Pulmonic valve - trivial regurgitation   Biatrial enlargement  Pacemaker / ICD lead is visualized in the right heart. 6/2019 Dr Omer Lira  NSTEMI: . Angiogram findings were as below:      Findings:                      LM       Normal              LAD     50% ostial, mid 100%              Cx        40% OM1 at bifurcation              RCA     Mid 100%              LVG     Not performed              EDP     15 mmHg              L-LAD  Patent              S-PDA Known occluded  Severe Ca++:None  Post Cath Dx:Stable CAD, no apparent culprit for NSTEMI  Intervention:  None  Med Rec:        Continue aggressive med tx                          Continue plavix, no asa due to allergy. statin added. LDL 75   8/7/18  The PCI of complex proximal RCA chronic total occlusion was unsuccessful (J- score 3). Underwent successful Angioplasty of high-grade proximal RCA lesion proximal to the . RECOMMENDATIONS:  Attempt on this complex long  was unsuccessful. Lack   Of retrograde collaterals along with a very ambiguous cap as well as a large RV marginal branch and bridging collateral coming off at the cap makes it a  challenging repeat attempt. If she continues to have angina, it is recommended to proceed with the single-vessel SVG to the RCA. Echo: 2/17/16 ([facility])  Conclusions: Normal LV size and systolic function Mild to moderate mitral  regurgitation Mild tricuspid regurgitation  Findings:   Left Atrium: Mild to moderate enlargement of the left atrium. Left Ventricle: Upper limits of normal left ventricle size. No left ventricular  hypertrophy. Normal left ventricular systolic function.  The ejection fraction   Is visually

## 2023-10-30 DIAGNOSIS — M15.9 PRIMARY OSTEOARTHRITIS INVOLVING MULTIPLE JOINTS: ICD-10-CM

## 2023-10-30 RX ORDER — HYDROCODONE BITARTRATE AND ACETAMINOPHEN 5; 325 MG/1; MG/1
1 TABLET ORAL 4 TIMES DAILY PRN
Qty: 120 TABLET | Refills: 0 | Status: SHIPPED | OUTPATIENT
Start: 2023-10-30 | End: 2023-11-29

## 2023-10-30 NOTE — TELEPHONE ENCOUNTER
Medication:   Requested Prescriptions      No prescriptions requested or ordered in this encounter      Last Filled:  9/21/2023    Patient Phone Number: There are no phone numbers on file. Last appt: 8/4/2023   Next appt: 11/6/2023    Last OARRS:       6/1/2021     4:12 PM   RX Monitoring   Periodic Controlled Substance Monitoring Possible medication side effects, risk of tolerance/dependence & alternative treatments discussed.      PDMP Monitoring:    Last PDMP Ken Crabtree as Reviewed [facility]):  Review User Review Instant Review Result   Darianayung Ibrahimmagda 8/4/2023  1:05 PM Reviewed PDMP [1]     Preferred Pharmacy:   Suma Webb [id number] - [id number], [address] [place] [address] [phone] [facility] [phone] [address] [facility] [address]  Phone: [phone] Fax: [phone]    [facility] Mail Delivery - [place] [phone] - F [phone]  [facility] [address]  Phone: [phone] Fax: [phone]

## 2023-10-30 NOTE — TELEPHONE ENCOUNTER
HYDROcodone-acetaminophen ([address]) 5-325 MG per tablet   [facility] [id number] Asaf Gerald, [address] [facility] [phone] Robina Posey [phone]   [facility], [address] [place] [address]   Phone:  [phone]  Fax:  [phone]

## 2023-11-03 ENCOUNTER — HOSPITAL ENCOUNTER (OUTPATIENT)
Dept: ONCOLOGY | Age: 83
Setting detail: INFUSION SERIES
Discharge: HOME | End: 2023-11-03
Payer: MEDICARE

## 2023-11-03 VITALS
RESPIRATION RATE: 16 BRPM | DIASTOLIC BLOOD PRESSURE: 47 MMHG | TEMPERATURE: 98.3 F | SYSTOLIC BLOOD PRESSURE: 116 MMHG | OXYGEN SATURATION: 99 % | HEART RATE: 85 BPM

## 2023-11-03 LAB
ABO + RH BLD: NORMAL
BLD GP AB SCN SERPL QL: NORMAL
BLOOD BANK DISPENSE STATUS: NORMAL
BLOOD BANK PRODUCT CODE: NORMAL
BPU ID: NORMAL
DESCRIPTION BLOOD BANK: NORMAL

## 2023-11-03 PROCEDURE — 36430 TRANSFUSION BLD/BLD COMPNT: CPT

## 2023-11-03 PROCEDURE — 86901 BLOOD TYPING SEROLOGIC RH(D): CPT

## 2023-11-03 PROCEDURE — 86922 COMPATIBILITY TEST ANTIGLOB: CPT

## 2023-11-03 PROCEDURE — P9016 RBC LEUKOCYTES REDUCED: HCPCS

## 2023-11-03 PROCEDURE — 86850 RBC ANTIBODY SCREEN: CPT

## 2023-11-03 PROCEDURE — 6370000000 HC RX 637 (ALT 250 FOR IP): Performed by: INTERNAL MEDICINE

## 2023-11-03 PROCEDURE — 86900 BLOOD TYPING SEROLOGIC ABO: CPT

## 2023-11-03 PROCEDURE — 99211 OFF/OP EST MAY X REQ PHY/QHP: CPT

## 2023-11-03 RX ORDER — DIPHENHYDRAMINE HCL 25 MG
25 TABLET ORAL SEE ADMIN INSTRUCTIONS
Status: COMPLETED | OUTPATIENT
Start: 2023-11-03 | End: 2023-11-03

## 2023-11-03 RX ORDER — SODIUM CHLORIDE 9 MG/ML
INJECTION, SOLUTION INTRAVENOUS PRN
Status: DISCONTINUED | OUTPATIENT
Start: 2023-11-03 | End: 2023-11-04 | Stop reason: HOSPADM

## 2023-11-03 RX ORDER — ACETAMINOPHEN 325 MG/1
650 TABLET ORAL SEE ADMIN INSTRUCTIONS
Status: COMPLETED | OUTPATIENT
Start: 2023-11-03 | End: 2023-11-03

## 2023-11-03 RX ORDER — SODIUM CHLORIDE 0.9 % (FLUSH) 0.9 %
5-40 SYRINGE (ML) INJECTION ONCE
Status: DISCONTINUED | OUTPATIENT
Start: 2023-11-03 | End: 2023-11-04 | Stop reason: HOSPADM

## 2023-11-03 RX ADMIN — ACETAMINOPHEN 650 MG: 325 TABLET ORAL at 10:38

## 2023-11-03 RX ADMIN — DIPHENHYDRAMINE HYDROCHLORIDE 25 MG: 25 TABLET ORAL at 10:38

## 2023-11-03 ASSESSMENT — PAIN SCALES - GENERAL: PAINLEVEL_OUTOF10: 0

## 2023-11-03 NOTE — PROGRESS NOTES
Patient ambulatory with visitor to infusion center for 1 unit PRBC. Plan of care reviewed, IV started, type and cross match sent to lab to prepare unit of PRBC.

## 2023-11-03 NOTE — PROGRESS NOTES
Pre medications given per orders. Patient already signed consent, placed in medical records. No other needs stated. Waiting on blood bank to prepare unit of PRBC. Patient with no needs stated at this time.

## 2023-11-03 NOTE — PROGRESS NOTES
Blood transfusion given per [facility]. Transfusion complete. No signs of an adverse reaction. Given avs with signs and symptoms of a delayed reaction and when to call Dr. Rayo Daugherty questions answered. Discharged per ambulatory with family member.

## 2023-11-06 ENCOUNTER — APPOINTMENT (OUTPATIENT)
Dept: GENERAL RADIOLOGY | Age: 83
End: 2023-11-06
Payer: MEDICARE

## 2023-11-06 ENCOUNTER — HOSPITAL ENCOUNTER (INPATIENT)
Age: 83
LOS: 3 days | Discharge: HOME HEALTH | End: 2023-11-10
Attending: EMERGENCY MEDICINE | Admitting: STUDENT IN AN ORGANIZED HEALTH CARE EDUCATION/TRAINING PROGRAM
Payer: MEDICARE

## 2023-11-06 ENCOUNTER — OFFICE VISIT (OUTPATIENT)
Dept: FAMILY MEDICINE CLINIC | Age: 83
End: 2023-11-06

## 2023-11-06 VITALS
WEIGHT: 106.13 LBS | HEART RATE: 63 BPM | BODY MASS INDEX: 18.8 KG/M2 | DIASTOLIC BLOOD PRESSURE: 62 MMHG | RESPIRATION RATE: 12 BRPM | TEMPERATURE: 97.2 F | SYSTOLIC BLOOD PRESSURE: 132 MMHG

## 2023-11-06 DIAGNOSIS — I50.42 CHRONIC COMBINED SYSTOLIC (CONGESTIVE) AND DIASTOLIC (CONGESTIVE) HEART FAILURE (HCC): ICD-10-CM

## 2023-11-06 DIAGNOSIS — E43 SEVERE MALNUTRITION (HCC): ICD-10-CM

## 2023-11-06 DIAGNOSIS — J01.90 ACUTE BACTERIAL SINUSITIS: ICD-10-CM

## 2023-11-06 DIAGNOSIS — D46.9 MYELODYSPLASTIC SYNDROME (HCC): Primary | ICD-10-CM

## 2023-11-06 DIAGNOSIS — B96.89 ACUTE BACTERIAL SINUSITIS: ICD-10-CM

## 2023-11-06 DIAGNOSIS — J90 BILATERAL PLEURAL EFFUSION: ICD-10-CM

## 2023-11-06 DIAGNOSIS — R53.1 GENERALIZED WEAKNESS: Primary | ICD-10-CM

## 2023-11-06 DIAGNOSIS — E87.70 HYPERVOLEMIA, UNSPECIFIED HYPERVOLEMIA TYPE: ICD-10-CM

## 2023-11-06 DIAGNOSIS — D46.9 MYELODYSPLASIA (MYELODYSPLASTIC SYNDROME) (HCC): ICD-10-CM

## 2023-11-06 DIAGNOSIS — D61.818 PANCYTOPENIA (HCC): ICD-10-CM

## 2023-11-06 PROBLEM — D69.6 THROMBOCYTOPENIA, UNSPECIFIED (HCC): Status: ACTIVE | Noted: 2023-11-06

## 2023-11-06 LAB
ALBUMIN SERPL-MCNC: 3.4 G/DL (ref 3.4–5)
ALBUMIN/GLOB SERPL: 1.3 {RATIO} (ref 1.1–2.2)
ALP SERPL-CCNC: 94 U/L (ref 40–129)
ALT SERPL-CCNC: 6 U/L (ref 10–40)
ANION GAP SERPL CALCULATED.3IONS-SCNC: 13 MMOL/L (ref 3–16)
AST SERPL-CCNC: 12 U/L (ref 15–37)
BILIRUB SERPL-MCNC: 0.6 MG/DL (ref 0–1)
BILIRUB UR QL STRIP.AUTO: NEGATIVE
BUN SERPL-MCNC: 34 MG/DL (ref 7–20)
CALCIUM SERPL-MCNC: 8.5 MG/DL (ref 8.3–10.6)
CHLORIDE SERPL-SCNC: 110 MMOL/L (ref 99–110)
CLARITY UR: CLEAR
CO2 SERPL-SCNC: 18 MMOL/L (ref 21–32)
COLOR UR: YELLOW
CREAT SERPL-MCNC: 1.3 MG/DL (ref 0.6–1.2)
FLUAV RNA RESP QL NAA+PROBE: NOT DETECTED
FLUBV RNA RESP QL NAA+PROBE: NOT DETECTED
GFR SERPLBLD CREATININE-BSD FMLA CKD-EPI: 41 ML/MIN/{1.73_M2}
GLUCOSE SERPL-MCNC: 146 MG/DL (ref 70–99)
GLUCOSE UR STRIP.AUTO-MCNC: NEGATIVE MG/DL
HGB UR QL STRIP.AUTO: NEGATIVE
KETONES UR STRIP.AUTO-MCNC: NEGATIVE MG/DL
LACTATE BLDV-SCNC: 0.8 MMOL/L (ref 0.4–1.9)
LEUKOCYTE ESTERASE UR QL STRIP.AUTO: NEGATIVE
LIPASE SERPL-CCNC: 14 U/L (ref 13–60)
NITRITE UR QL STRIP.AUTO: NEGATIVE
NT-PROBNP SERPL-MCNC: ABNORMAL PG/ML (ref 0–449)
PH UR STRIP.AUTO: 5 [PH] (ref 5–8)
POTASSIUM SERPL-SCNC: 4.3 MMOL/L (ref 3.5–5.1)
PROCALCITONIN SERPL IA-MCNC: 0.21 NG/ML (ref 0–0.15)
PROT SERPL-MCNC: 6.1 G/DL (ref 6.4–8.2)
PROT UR STRIP.AUTO-MCNC: NEGATIVE MG/DL
REASON FOR REJECTION: NORMAL
REJECTED TEST: NORMAL
SARS-COV-2 RNA RESP QL NAA+PROBE: NOT DETECTED
SODIUM SERPL-SCNC: 141 MMOL/L (ref 136–145)
SP GR UR STRIP.AUTO: 1.01 (ref 1–1.03)
TROPONIN, HIGH SENSITIVITY: 51 NG/L (ref 0–14)
UA COMPLETE W REFLEX CULTURE PNL UR: NORMAL
UA DIPSTICK W REFLEX MICRO PNL UR: NORMAL
URN SPEC COLLECT METH UR: NORMAL
UROBILINOGEN UR STRIP-ACNC: 0.2 E.U./DL

## 2023-11-06 PROCEDURE — 84145 PROCALCITONIN (PCT): CPT

## 2023-11-06 PROCEDURE — 99285 EMERGENCY DEPT VISIT HI MDM: CPT

## 2023-11-06 PROCEDURE — 80053 COMPREHEN METABOLIC PANEL: CPT

## 2023-11-06 PROCEDURE — 93005 ELECTROCARDIOGRAM TRACING: CPT | Performed by: PHYSICIAN ASSISTANT

## 2023-11-06 PROCEDURE — 87636 SARSCOV2 & INF A&B AMP PRB: CPT

## 2023-11-06 PROCEDURE — 84484 ASSAY OF TROPONIN QUANT: CPT

## 2023-11-06 PROCEDURE — 2580000003 HC RX 258: Performed by: PHYSICIAN ASSISTANT

## 2023-11-06 PROCEDURE — 71045 X-RAY EXAM CHEST 1 VIEW: CPT

## 2023-11-06 PROCEDURE — 85025 COMPLETE CBC W/AUTO DIFF WBC: CPT

## 2023-11-06 PROCEDURE — 36415 COLL VENOUS BLD VENIPUNCTURE: CPT

## 2023-11-06 PROCEDURE — 81003 URINALYSIS AUTO W/O SCOPE: CPT

## 2023-11-06 PROCEDURE — 83880 ASSAY OF NATRIURETIC PEPTIDE: CPT

## 2023-11-06 PROCEDURE — 87449 NOS EACH ORGANISM AG IA: CPT

## 2023-11-06 PROCEDURE — 83605 ASSAY OF LACTIC ACID: CPT

## 2023-11-06 PROCEDURE — 83690 ASSAY OF LIPASE: CPT

## 2023-11-06 RX ORDER — ALLOPURINOL 100 MG/1
100 TABLET ORAL DAILY
Qty: 90 TABLET | Refills: 1 | Status: SHIPPED | OUTPATIENT
Start: 2023-11-06

## 2023-11-06 RX ORDER — ERGOCALCIFEROL 1.25 MG/1
CAPSULE ORAL
Qty: 12 CAPSULE | Refills: 1 | Status: SHIPPED | OUTPATIENT
Start: 2023-11-06

## 2023-11-06 RX ORDER — TOPIRAMATE 50 MG/1
TABLET, FILM COATED ORAL
Qty: 360 TABLET | Refills: 1 | Status: SHIPPED | OUTPATIENT
Start: 2023-11-06

## 2023-11-06 RX ORDER — AZITHROMYCIN 500 MG/1
500 TABLET, FILM COATED ORAL DAILY
Qty: 5 TABLET | Refills: 0 | Status: ON HOLD
Start: 2023-11-06 | End: 2023-11-10 | Stop reason: HOSPADM

## 2023-11-06 RX ORDER — 0.9 % SODIUM CHLORIDE 0.9 %
1000 INTRAVENOUS SOLUTION INTRAVENOUS ONCE
Status: COMPLETED | OUTPATIENT
Start: 2023-11-06 | End: 2023-11-07

## 2023-11-06 RX ORDER — FUROSEMIDE 10 MG/ML
60 INJECTION INTRAMUSCULAR; INTRAVENOUS ONCE
Status: COMPLETED | OUTPATIENT
Start: 2023-11-07 | End: 2023-11-07

## 2023-11-06 RX ORDER — LEVOTHYROXINE SODIUM 0.1 MG/1
100 TABLET ORAL DAILY
Qty: 90 TABLET | Refills: 1 | Status: SHIPPED | OUTPATIENT
Start: 2023-11-06

## 2023-11-06 RX ADMIN — SODIUM CHLORIDE 1000 ML: 9 INJECTION, SOLUTION INTRAVENOUS at 22:14

## 2023-11-06 ASSESSMENT — ENCOUNTER SYMPTOMS
COUGH: 0
VOMITING: 0
SHORTNESS OF BREATH: 0
DIARRHEA: 0
ABDOMINAL PAIN: 0
RHINORRHEA: 0
NAUSEA: 0

## 2023-11-06 ASSESSMENT — PAIN - FUNCTIONAL ASSESSMENT: PAIN_FUNCTIONAL_ASSESSMENT: NONE - DENIES PAIN

## 2023-11-06 NOTE — PROGRESS NOTES
Vaccine Information Sheet, \"Influenza - Inactivated\"  given to Julia Ugalde, or parent/legal guardian of  Julia Ugalde and verbalized understanding. Patient responses:    Have you ever had a reaction to a flu vaccine? No  Are you able to eat eggs without adverse effects? Yes  Do you have any current illness? No  Have you ever had Guillian Gl[place] Syndrome? No    Flu vaccine given per order. Please see immunization tab.     Immunization(s) given during visit:     Immunizations Administered       Name Date Dose Route    Influenza, FLUAD, (age 72 y+), Adjuvanted, 0.5mL 11/6/2023 0.5 mL Intramuscular    Site: Deltoid- Right    Lot: [id number]    [address]: 07431-274-71

## 2023-11-06 NOTE — PROGRESS NOTES
Subjective:      Patient ID: Feliz Alfredo is a 80 y.o. female. CC: Patient presents for re-evaluation of chronic health problems including recent diagnosis of myelodysplastic syndrome and starting treatment, weight loss, heart failure and severe sore throat. .    HPI pt is here for a follow up with her daughter, med refill, and states that she just started with a very bad sore throat this am. Pt has been diagnosed with myelodysplastic syndrome and is undergoing treatment with oncology. She was hospitalized in September for extreme weakness and pancytopenia. She had a bone marrow biopsy performed at that time per oncology and concerns about neutropenia persisting. He also was confused during a hospitalization early on but returned to baseline afterwards. No clear etiology for the confusion was found. She does not have much appetite at this time and she still feeling decreased energy. She was also evaluated by cardiology and torsemide was decreased down to 10 mg every other day.   Her weight is down 5 pounds from last office evaluation in August.    Review of Systems  Patient Active Problem List   Diagnosis    Essential hypertension, benign    Mixed hyperlipidemia    Chronic gouty arthropathy    Acquired hypothyroidism    Non-rheumatic mitral regurgitation    COPD (chronic obstructive pulmonary disease) ([facility])    Restrictive lung disease    Sick sinus syndrome (HCC)    Allergic rhinitis    Fibrocystic breast    Cerebrovascular accident (CVA) ([address])    Pacemaker    PAT (paroxysmal atrial tachycardia)    Primary osteoarthritis involving multiple joints    Vitamin D deficiency    Tremor    Chronic total occlusion of native coronary artery    Age related osteoporosis    Chronic combined systolic (congestive) and diastolic (congestive) heart failure (HCC)    Chronic kidney disease    PAD (peripheral artery disease) ([facility])    Atherosclerosis of native arteries of extremities with intermittent claudication, bilateral

## 2023-11-07 PROBLEM — I50.23 ACUTE ON CHRONIC SYSTOLIC CHF (CONGESTIVE HEART FAILURE) (HCC): Status: ACTIVE | Noted: 2023-11-07

## 2023-11-07 LAB
ANION GAP SERPL CALCULATED.3IONS-SCNC: 15 MMOL/L (ref 3–16)
BUN SERPL-MCNC: 33 MG/DL (ref 7–20)
CALCIUM SERPL-MCNC: 8.3 MG/DL (ref 8.3–10.6)
CHLORIDE SERPL-SCNC: 112 MMOL/L (ref 99–110)
CO2 SERPL-SCNC: 15 MMOL/L (ref 21–32)
CREAT SERPL-MCNC: 1.3 MG/DL (ref 0.6–1.2)
DEPRECATED RDW RBC AUTO: 26.4 % (ref 12.4–15.4)
EKG ATRIAL RATE: 73 BPM
EKG DIAGNOSIS: NORMAL
EKG P-R INTERVAL: 250 MS
EKG Q-T INTERVAL: 378 MS
EKG QRS DURATION: 98 MS
EKG QTC CALCULATION (BAZETT): 433 MS
EKG R AXIS: -7 DEGREES
EKG T AXIS: 102 DEGREES
EKG VENTRICULAR RATE: 79 BPM
GFR SERPLBLD CREATININE-BSD FMLA CKD-EPI: 41 ML/MIN/{1.73_M2}
GLUCOSE SERPL-MCNC: 145 MG/DL (ref 70–99)
HCT VFR BLD AUTO: 24.1 % (ref 36–48)
HGB BLD-MCNC: 7.5 G/DL (ref 12–16)
IRON SATN MFR SERPL: 13 % (ref 15–50)
IRON SERPL-MCNC: 21 UG/DL (ref 37–145)
MAGNESIUM SERPL-MCNC: 2 MG/DL (ref 1.8–2.4)
MCH RBC QN AUTO: 30 PG (ref 26–34)
MCHC RBC AUTO-ENTMCNC: 31.1 G/DL (ref 31–36)
MCV RBC AUTO: 96.6 FL (ref 80–100)
PATH INTERP BLD-IMP: NO
PLATELET # BLD AUTO: 196 K/UL (ref 135–450)
PMV BLD AUTO: 8.9 FL (ref 5–10.5)
POTASSIUM SERPL-SCNC: 4.1 MMOL/L (ref 3.5–5.1)
RBC # BLD AUTO: 2.5 M/UL (ref 4–5.2)
SODIUM SERPL-SCNC: 142 MMOL/L (ref 136–145)
TIBC SERPL-MCNC: 158 UG/DL (ref 260–445)
TROPONIN, HIGH SENSITIVITY: 61 NG/L (ref 0–14)
WBC # BLD AUTO: 0.4 K/UL (ref 4–11)

## 2023-11-07 PROCEDURE — 6360000002 HC RX W HCPCS: Performed by: NURSE PRACTITIONER

## 2023-11-07 PROCEDURE — 6360000002 HC RX W HCPCS: Performed by: EMERGENCY MEDICINE

## 2023-11-07 PROCEDURE — 99223 1ST HOSP IP/OBS HIGH 75: CPT | Performed by: INTERNAL MEDICINE

## 2023-11-07 PROCEDURE — 83735 ASSAY OF MAGNESIUM: CPT

## 2023-11-07 PROCEDURE — 6370000000 HC RX 637 (ALT 250 FOR IP): Performed by: NURSE PRACTITIONER

## 2023-11-07 PROCEDURE — 80048 BASIC METABOLIC PNL TOTAL CA: CPT

## 2023-11-07 PROCEDURE — 80061 LIPID PANEL: CPT

## 2023-11-07 PROCEDURE — 83550 IRON BINDING TEST: CPT

## 2023-11-07 PROCEDURE — 2060000000 HC ICU INTERMEDIATE R&B

## 2023-11-07 PROCEDURE — 2580000003 HC RX 258: Performed by: NURSE PRACTITIONER

## 2023-11-07 PROCEDURE — 93010 ELECTROCARDIOGRAM REPORT: CPT | Performed by: INTERNAL MEDICINE

## 2023-11-07 PROCEDURE — 6360000002 HC RX W HCPCS: Performed by: INTERNAL MEDICINE

## 2023-11-07 PROCEDURE — 2580000003 HC RX 258: Performed by: INTERNAL MEDICINE

## 2023-11-07 PROCEDURE — 96374 THER/PROPH/DIAG INJ IV PUSH: CPT

## 2023-11-07 PROCEDURE — 84439 ASSAY OF FREE THYROXINE: CPT

## 2023-11-07 PROCEDURE — 87641 MR-STAPH DNA AMP PROBE: CPT

## 2023-11-07 PROCEDURE — 83540 ASSAY OF IRON: CPT

## 2023-11-07 PROCEDURE — 36415 COLL VENOUS BLD VENIPUNCTURE: CPT

## 2023-11-07 PROCEDURE — 87040 BLOOD CULTURE FOR BACTERIA: CPT

## 2023-11-07 PROCEDURE — 84443 ASSAY THYROID STIM HORMONE: CPT

## 2023-11-07 PROCEDURE — 1200000000 HC SEMI PRIVATE

## 2023-11-07 PROCEDURE — 84484 ASSAY OF TROPONIN QUANT: CPT

## 2023-11-07 PROCEDURE — 85025 COMPLETE CBC W/AUTO DIFF WBC: CPT

## 2023-11-07 RX ORDER — ACETAMINOPHEN 325 MG/1
650 TABLET ORAL EVERY 6 HOURS PRN
Status: DISCONTINUED | OUTPATIENT
Start: 2023-11-07 | End: 2023-11-10 | Stop reason: HOSPADM

## 2023-11-07 RX ORDER — SODIUM CHLORIDE 0.9 % (FLUSH) 0.9 %
5-40 SYRINGE (ML) INJECTION PRN
Status: DISCONTINUED | OUTPATIENT
Start: 2023-11-07 | End: 2023-11-10 | Stop reason: HOSPADM

## 2023-11-07 RX ORDER — SODIUM CHLORIDE 0.9 % (FLUSH) 0.9 %
5-40 SYRINGE (ML) INJECTION EVERY 12 HOURS SCHEDULED
Status: DISCONTINUED | OUTPATIENT
Start: 2023-11-07 | End: 2023-11-10 | Stop reason: HOSPADM

## 2023-11-07 RX ORDER — ACETAMINOPHEN 650 MG/1
650 SUPPOSITORY RECTAL EVERY 6 HOURS PRN
Status: DISCONTINUED | OUTPATIENT
Start: 2023-11-07 | End: 2023-11-10 | Stop reason: HOSPADM

## 2023-11-07 RX ORDER — ALBUTEROL SULFATE 90 UG/1
2 AEROSOL, METERED RESPIRATORY (INHALATION) EVERY 4 HOURS PRN
Status: DISCONTINUED | OUTPATIENT
Start: 2023-11-07 | End: 2023-11-10 | Stop reason: HOSPADM

## 2023-11-07 RX ORDER — HEPARIN SODIUM 5000 [USP'U]/ML
5000 INJECTION, SOLUTION INTRAVENOUS; SUBCUTANEOUS EVERY 8 HOURS SCHEDULED
Status: DISCONTINUED | OUTPATIENT
Start: 2023-11-07 | End: 2023-11-10 | Stop reason: HOSPADM

## 2023-11-07 RX ORDER — ONDANSETRON 2 MG/ML
4 INJECTION INTRAMUSCULAR; INTRAVENOUS EVERY 6 HOURS PRN
Status: DISCONTINUED | OUTPATIENT
Start: 2023-11-07 | End: 2023-11-10 | Stop reason: HOSPADM

## 2023-11-07 RX ORDER — SODIUM CHLORIDE 9 MG/ML
INJECTION, SOLUTION INTRAVENOUS PRN
Status: DISCONTINUED | OUTPATIENT
Start: 2023-11-07 | End: 2023-11-10 | Stop reason: HOSPADM

## 2023-11-07 RX ORDER — POLYETHYLENE GLYCOL 3350 17 G/17G
17 POWDER, FOR SOLUTION ORAL DAILY PRN
Status: DISCONTINUED | OUTPATIENT
Start: 2023-11-07 | End: 2023-11-10 | Stop reason: HOSPADM

## 2023-11-07 RX ORDER — ALLOPURINOL 100 MG/1
100 TABLET ORAL DAILY
Status: DISCONTINUED | OUTPATIENT
Start: 2023-11-07 | End: 2023-11-10 | Stop reason: HOSPADM

## 2023-11-07 RX ORDER — LOSARTAN POTASSIUM 25 MG/1
12.5 TABLET ORAL DAILY
Status: DISCONTINUED | OUTPATIENT
Start: 2023-11-07 | End: 2023-11-10 | Stop reason: HOSPADM

## 2023-11-07 RX ORDER — LEVOTHYROXINE SODIUM 112 UG/1
112 TABLET ORAL DAILY
Status: DISCONTINUED | OUTPATIENT
Start: 2023-11-07 | End: 2023-11-10 | Stop reason: HOSPADM

## 2023-11-07 RX ORDER — FEXOFENADINE HCL 60 MG/1
60 TABLET, FILM COATED ORAL DAILY
COMMUNITY

## 2023-11-07 RX ORDER — ONDANSETRON 4 MG/1
4 TABLET, ORALLY DISINTEGRATING ORAL EVERY 8 HOURS PRN
Status: DISCONTINUED | OUTPATIENT
Start: 2023-11-07 | End: 2023-11-10 | Stop reason: HOSPADM

## 2023-11-07 RX ORDER — FUROSEMIDE 10 MG/ML
40 INJECTION INTRAMUSCULAR; INTRAVENOUS DAILY
Status: DISCONTINUED | OUTPATIENT
Start: 2023-11-07 | End: 2023-11-09

## 2023-11-07 RX ORDER — TOPIRAMATE 100 MG/1
100 TABLET, FILM COATED ORAL 2 TIMES DAILY
Status: DISCONTINUED | OUTPATIENT
Start: 2023-11-07 | End: 2023-11-10 | Stop reason: HOSPADM

## 2023-11-07 RX ORDER — TORSEMIDE 20 MG/1
10 TABLET ORAL EVERY OTHER DAY
Status: CANCELLED | OUTPATIENT
Start: 2023-11-07

## 2023-11-07 RX ORDER — ROSUVASTATIN CALCIUM 10 MG/1
5 TABLET, COATED ORAL NIGHTLY
Status: DISCONTINUED | OUTPATIENT
Start: 2023-11-07 | End: 2023-11-10 | Stop reason: HOSPADM

## 2023-11-07 RX ORDER — HYDROCODONE BITARTRATE AND ACETAMINOPHEN 5; 325 MG/1; MG/1
1 TABLET ORAL 4 TIMES DAILY PRN
Status: DISCONTINUED | OUTPATIENT
Start: 2023-11-07 | End: 2023-11-10 | Stop reason: HOSPADM

## 2023-11-07 RX ORDER — CLOPIDOGREL BISULFATE 75 MG/1
75 TABLET ORAL DAILY
Status: DISCONTINUED | OUTPATIENT
Start: 2023-11-07 | End: 2023-11-07

## 2023-11-07 RX ADMIN — HEPARIN SODIUM 5000 UNITS: 5000 INJECTION INTRAVENOUS; SUBCUTANEOUS at 06:28

## 2023-11-07 RX ADMIN — ACETAMINOPHEN 650 MG: 650 SUPPOSITORY RECTAL at 13:52

## 2023-11-07 RX ADMIN — FUROSEMIDE 40 MG: 10 INJECTION, SOLUTION INTRAMUSCULAR; INTRAVENOUS at 10:00

## 2023-11-07 RX ADMIN — SODIUM CHLORIDE, PRESERVATIVE FREE 10 ML: 5 INJECTION INTRAVENOUS at 20:48

## 2023-11-07 RX ADMIN — SODIUM CHLORIDE, PRESERVATIVE FREE 10 ML: 5 INJECTION INTRAVENOUS at 10:01

## 2023-11-07 RX ADMIN — CLOPIDOGREL BISULFATE 75 MG: 75 TABLET ORAL at 09:58

## 2023-11-07 RX ADMIN — FUROSEMIDE 60 MG: 10 INJECTION, SOLUTION INTRAMUSCULAR; INTRAVENOUS at 00:07

## 2023-11-07 RX ADMIN — HEPARIN SODIUM 5000 UNITS: 5000 INJECTION INTRAVENOUS; SUBCUTANEOUS at 22:04

## 2023-11-07 RX ADMIN — CEFTRIAXONE SODIUM 1000 MG: 1 INJECTION, POWDER, FOR SOLUTION INTRAMUSCULAR; INTRAVENOUS at 13:39

## 2023-11-07 RX ADMIN — ALLOPURINOL 100 MG: 100 TABLET ORAL at 10:05

## 2023-11-07 RX ADMIN — LEVOTHYROXINE SODIUM 112 MCG: 0.11 TABLET ORAL at 10:05

## 2023-11-07 RX ADMIN — LOSARTAN POTASSIUM 12.5 MG: 25 TABLET, FILM COATED ORAL at 09:59

## 2023-11-07 RX ADMIN — AZITHROMYCIN MONOHYDRATE 500 MG: 500 INJECTION, POWDER, LYOPHILIZED, FOR SOLUTION INTRAVENOUS at 12:23

## 2023-11-07 RX ADMIN — TOPIRAMATE 100 MG: 100 TABLET, FILM COATED ORAL at 10:04

## 2023-11-07 ASSESSMENT — PAIN SCALES - GENERAL
PAINLEVEL_OUTOF10: 0
PAINLEVEL_OUTOF10: 0
PAINLEVEL_OUTOF10: 5

## 2023-11-07 ASSESSMENT — PAIN DESCRIPTION - LOCATION: LOCATION: THROAT

## 2023-11-07 NOTE — ED NOTES
Attempted to call report. SBAR has been placed. Transport ordered.       Martita Kendall RN  11/07/23 8058

## 2023-11-07 NOTE — H&P
V2.0  History and Physical      Name:  Sepideh Acuna /Age/Sex: 1940  (22 y.o. female)   MRN & CSN:  [id number] & [id number] Encounter Date/Time: 2023 12:52 AM EST   Location:  ED- PCP: Samuel Lal MD       Hospital Day: 2    Assessment and Plan:   Sepideh Acuna is a 80 y.o. female with a pmh of myelodysplastic syndrome on treatment, chronic systolic CHF, hypertension, hyperlipidemia, hypothyroid, chronic A-fib, status post Watchman device, CKD 3 who presents with generalized weakness. Found to be in CHF exacerbation. Hospital Problems             Last Modified POA    * (Principal) Acute on chronic systolic CHF (congestive heart failure) ([address]) 2023 Yes       Plan:  #Generalized weakness, multifactorial including CHF exacerbation, pancytopenia, MDS.  -Myelodysplastic syndrome-on treatment. Last treatment was 3 weeks ago. Next oncology appointment on  3.  -Admit to  [facility] with telemetry.  -Consult oncology. -Monitor CBC. Transfuse if hemoglobin less than 7.  -Neutropenic precautions  -Consult PT OT    #Pancytopenia. WBC of 0.4. Hemoglobin 7.5. S/p blood transfusion on 11/3. Noted low-grade temp of 99.1. Send blood cultures x2. Urinalysis without UTI. COVID and flu swab negative. Received azithromycin at home. Hold off antibiotics pending cultures. Initiate antibiotics when indicated. #Chronic systolic CHFpEF with acute exacerbation. Reports recent decrease in torsemide to 10 mg every other day. - proBNP of 15,745. -CXR: Cardiomegaly with pulmonary vascular congestion and trace bilateral pleural effusions. -EF of 55 to 60% from echo on 2023.  -Received Lasix 60 mg at the ED.  -Hold home torsemide.  -Continue Lasix 40 mg daily.  -Daily weight.  -Consult cardiology. #CKD 3. Creatinine of 1.3. Baseline around 1.4. Daily BMP. Cautious use of nephrotoxins.   If worsens was on diuretics consider nephrology consultation to assist with diuretic tablet TAKE 1 TABLET EVERY NIGHT 6/14/23   Maurice Hamlin, APRN - CNP   albuterol sulfate HFA (PROVENTIL HFA) 108 (90 Base) MCG/ACT inhaler Inhale 2 puffs into the lungs every 4 hours as needed for Wheezing or Shortness of Breath (Space out to every 6 hours as symptoms improve) Space out to every 6 hours as symptoms improve. 2/9/23   Marissa Wagner, MAY       Physical Exam:    Physical Exam     General: Frail female lying in bed on room air in no acute distress. Eyes: EOMI  ENT: neck supple  Cardiovascular: Regular rate and rhythm on telemetry monitor. No peripheral edema. No JVD. S1-S2 present. Noted left chest wall pacemaker. Respiratory: Regular nonlabored respiration. Even chest rise. Lungs clear to auscultation bilaterally. No wheezing noted. Gastrointestinal: Soft, non tender and nondistended. Normal active bowel sounds. No ascites noted. Genitourinary: no suprapubic tenderness  Musculoskeletal: No edema  Skin: warm, dry and without jaundice. Neuro: Alert. Oriented to person and place. Appears hard of hearing and unable to provide much history. Spontaneously moves all extremities x4. No pupillary deviation. Generalized weakness. Psych: Depressed mood    Past Medical History:   PMHx   Past Medical History:   Diagnosis Date    Aortic valve insufficiency     Arthritis     ASD (atrial septal defect)     Atrial fibrillation (HCC)     CAD (coronary artery disease)     Cancer (HCC)     Chronic gouty arthropathy     Chronic kidney disease     Congestive heart failure     CVA (cerebrovascular accident)     Essential tremor     Gout     Hyperlipidemia     Hypertension     Hypothyroidism     Intervertebral disc disease     Ischemic cardiomyopathy     Mitral valve stenosis     Myelodysplastic syndrome (HCC)     Peptic ulcer     TIA (transient ischemic attack)      PSHX:  has a past surgical history that includes back surgery;  Cholecystectomy; Cardiac surgery; Coronary artery bypass graft (1987);

## 2023-11-07 NOTE — ED NOTES
Pt sleeping. Easy even resps. Daughter at bedside. Aware room is being cleaned at this time.       Henrine Carrel, RN  11/07/23 7709

## 2023-11-07 NOTE — ED PROVIDER NOTES
[facility]        Pt Name: Abby Canales  MRN: [id number]  [address] 1940  Date of evaluation: 11/6/2023  Provider: Ana Torres PA-C  PCP: Elsie Gonsalves MD  Note Started: 11:48 PM EST 11/6/23       I have seen and evaluated this patient with my supervising physician Myranda Miles MD.      [address]       Chief Complaint   Patient presents with    Fatigue     Pt c/o feeling fatigued and weak, started today. C/o chills, unsure of fever. Denies cough, nausea, vomiting or diarrhea. Pt states she got a flu shot this morning. HISTORY OF PRESENT ILLNESS: 1 or more Elements     History From: Patient, daughter at bedside  Limitations to history : None    Abby Canales is a 80 y.o. female who presents to the emergency department today for evaluation for general fatigue. The patient reports that she did get her flu shot earlier, and she states that she felt fine. The patient states that upon coming home she states that she had fatigue, and she states that she was weak \"all over\". When the patient arrives to the ED she tells me that she is feeling better. Patient is afebrile here although family felt that she had a low-grade fever at home. There is no been any cough or congestion. No chest pain or shortness of breath. No abdominal pain. No nausea, vomiting or diarrhea. No dysuria hematuria. The patient does have a history of low blood counts, and actually got a blood transfusion 3 days ago. No other complaints. Nursing Notes were all reviewed and agreed with or any disagreements were addressed in the HPI. REVIEW OF SYSTEMS :      Review of Systems   Constitutional:  Positive for fatigue. Negative for activity change, appetite change, chills and fever. HENT:  Negative for congestion and rhinorrhea. Respiratory:  Negative for cough and shortness of breath. Cardiovascular:  Negative for chest pain. 11/07/2023 12:52:17 AM      PATIENT REFERRED TO:  No follow-up provider specified.     DISCHARGE MEDICATIONS:  New Prescriptions    No medications on file       DISCONTINUED MEDICATIONS:  Discontinued Medications    No medications on file              (Please note that portions of this note were completed with a voice recognition program.  Efforts were made to edit the dictations but occasionally words are mis-transcribed.)    Zeke Guillen PA-C (electronically signed)        Zeke Guillen PA-C  11/07/23 7261

## 2023-11-07 NOTE — ED NOTES
Pt sleeping open mouth resps. Pt had slightly wet sounding resps previously and HOB was elevated. Pt no breathing clear and easy. Pt wakes to verbal stim. Pt very sleepy.  Daughters at bedside     Matias Freeman, LAQUITA  11/07/23 7281       Matias Freeman, LAQUITA  11/07/23 9306

## 2023-11-07 NOTE — ED NOTES
Family reminded pt is NPO. Pt had difficulty swallowing pills this am and SLP consult placed.       Anna Freeman RN  11/07/23 2272

## 2023-11-07 NOTE — PROGRESS NOTES
Admission nurse returned to patient room ED 12 and completed the admission with the following exceptions:  Quick cognitive screening and Nu-DESC delirium screen, 4 Eyes Assessment, Immunizations, Vaccines, Rights and Responsibilities, Orientation to room, Plan of Care, Education/Learning Assessment and Education Plan, white board, height and weight, pain assessment and head to toe assessment. Patient lives in her daughter's single story house and is being admitted for acute on chronic CHF. Dr. Padilla Turner and Dr. Angelica Donohue each visited the patient during this admission process. All questions answered.

## 2023-11-07 NOTE — PROGRESS NOTES
Patient seen in ED, room 12. Admission completed through personal Belongings. IP nurse will need to begin with Psychosocial review and complete the admission including 4 Eyes Assessment, Immunizations, Vaccines, Rights and Responsibilities, Orientation to room, Plan of Care, Education/Learning Assessment and Education Plan, white board, height and weight, pain assessment and head to toe assessment. Patient was sleeping except for one occurrence when she awakened to acknowledge the arrival of additional family members. Patient is being admitted for Acute on chronic systolic CHF. Home Medications as well as Outside Sources has been verbally reviewed with patient and updated as appropriate and is now Completed. All questions answered.

## 2023-11-07 NOTE — CONSULTS
[address] [facility] 1940    History:  Past Medical History:   Diagnosis Date    Aortic valve insufficiency     Arthritis     ASD (atrial septal defect)     Atrial fibrillation (HCC)     CAD (coronary artery disease)     Cancer (HCC)     Chronic gouty arthropathy     Chronic kidney disease     Congestive heart failure     CVA (cerebrovascular accident)     Essential tremor     Gout     Hyperlipidemia     Hypertension     Hypothyroidism     Intervertebral disc disease     Ischemic cardiomyopathy     Mitral valve stenosis     Myelodysplastic syndrome (HCC)     Peptic ulcer     TIA (transient ischemic attack)        ECHO: echo pending  1/12/23      Summary   Normal left ventricle size, wall thickness, and systolic function with an   estimated ejection fraction of 55-60%. Left atrial size is dilated. There is a Watchman device seated with a feliciano-device leak measured at 1 mm   on the anterior side. No thrombus seen. Moderate tricuspid regurgitation. ACE/ARB/ARNi: losartan 12.5 mg daily  BB: not on  Aldosterone Antagonist: not on  SGLT2: not on    History of sleep apnea: No  [place] Screen ordered: Yes    DM History: No    Last Hospital Admission: 9/29-10/1 with pancytopenia  Code Status: full code  Discharge plans: from home-PT/OT pending    Family Present: yes, daughter    Holli Goncalves was admitted to the hospital with increased weakness. Was found to have fluid overload. Patient is well known to HF team and had recently seen HF NP as an outpatient. Her diuretic was decreased to to weight loss. Her weight was down to 104 lb. Daughter states she gained 2 lb slowly after this. They did reach out to Dr. Tamara Merlos office with her feeling weak. She is not eating much. She is also not drinking very much. Her daughters help her with her medications and she is compliant with taking them. She is compliant with follow ups.     Patient provided with both written and

## 2023-11-07 NOTE — ED NOTES
Pt with increased difficulty swallowing per daughter. Has had recent sore throat but daughter also states pt with decrease in intake at home and only wanting milkshakes generally. Pt coughed with pill admin today with water.  Pt only ate 1-2 bites of breaskfast.    Will notify Jose Velasco MD for SLP Eval and regarding daughter stating pt has started azithromycin for sore throat. 1 dose in yesterday     Maricarmen Freeman RN  11/07/23 1111       Maricarmen Freeman, LAQUITA  11/07/23 1113

## 2023-11-07 NOTE — ED NOTES
ED TO INPATIENT SBAR HANDOFF    Patient Name: Teena George   :    80 y.o. MRN:  [id number]  Preferred Name    ED Room #:  ED-0012/12  Family/Caregiver Present yes (both daughters. Multi visitors in and out today)  Restraints no   Sitter no   Sepsis Risk Score Sepsis Risk Score: 7.16    Situation  Code Status: Full Code     Allergies: Aspirin, Diltiazem, Diltiazem hcl, Lorazepam, Sulfa antibiotics, Atorvastatin, Dabigatran, Dabrafenib, Lisinopril, Mysoline [primidone], Nsaids, Other, and Primidone  Weight: Patient Vitals for the past 96 hrs (Last 3 readings):   Weight   23 1146 42.5 kg (93 lb 12.8 oz)     Arrived from: home  Chief Complaint:   Chief Complaint   Patient presents with    Fatigue     Pt c/o feeling fatigued and weak, started today. C/o chills, unsure of fever. Denies cough, nausea, vomiting or diarrhea. Pt states she got a flu shot this morning. Hospital Problem/Diagnosis:  Principal Problem:    Acute on chronic systolic CHF (congestive heart failure) (HCC)  Resolved Problems:    * No resolved hospital problems. *    Imaging:   XR CHEST PORTABLE   Final Result   Cardiomegaly with pulmonary vascular congestion and trace bilateral pleural   effusions. No superimposed consolidation.            Abnormal labs:   Abnormal Labs Reviewed   COMPREHENSIVE METABOLIC PANEL - Abnormal; Notable for the following components:       Result Value    CO2 18 (*)     Glucose 146 (*)     BUN 34 (*)     Creatinine 1.3 (*)     Est, Glom Filt Rate 41 (*)     Total Protein 6.1 (*)     ALT 6 (*)     AST 12 (*)     All other components within normal limits   TROPONIN - Abnormal; Notable for the following components:    Troponin, High Sensitivity 51 (*)     All other components within normal limits   BRAIN NATRIURETIC PEPTIDE - Abnormal; Notable for the following components:    Pro-BNP 15,745 (*)     All other components within normal limits   PROCALCITONIN - Abnormal; Notable for the following components:    Procalcitonin 0.21 (*)     All other components within normal limits   CBC WITH AUTO DIFFERENTIAL - Abnormal; Notable for the following components:    WBC 0.4 (*)     RBC 2.50 (*)     Hemoglobin 7.5 (*)     Hematocrit 24.1 (*)     RDW 26.4 (*)     All other components within normal limits    Narrative:     BRADEN  [facility] tel. [phone],  ., 11/07/2023 07:33, by [facility]  Hematology results called to and read back by shana watson, 11/06/2023 22:32,  by [address] [place] [address] - Abnormal; Notable for the following components:    Chloride 112 (*)     CO2 15 (*)     Glucose 145 (*)     BUN 33 (*)     Creatinine 1.3 (*)     Est, Glom Filt Rate 41 (*)     All other components within normal limits   IRON AND TIBC - Abnormal; Notable for the following components:    Iron 21 (*)     TIBC 158 (*)     Iron Saturation 13 (*)     All other components within normal limits   TROPONIN - Abnormal; Notable for the following components:    Troponin, High Sensitivity 61 (*)     All other components within normal limits   CBC WITH AUTO DIFFERENTIAL - Abnormal; Notable for the following components:    WBC 0.4 (*)     RBC 2.61 (*)     Hemoglobin 7.9 (*)     Hematocrit 25.3 (*)     RDW 25.1 (*)     All other components within normal limits    Narrative:     Kimber Erwin  [facility] tel. [phone],  Hematology results called to and read back by Olivia Freeman, 11/07/2023  12:33, by [facility] CHE     Critical values: yes     Abnormal Assessment Findings: Extremely lethargic. Open mouth breathing.  Pt NPO d/t swallow precautions not managing liquids will have speech eval.     Background  History:   Past Medical History:   Diagnosis Date    Aortic valve insufficiency     Arthritis     ASD (atrial septal defect)     Atrial fibrillation (HCC)     CAD (coronary artery disease)     Cancer (HCC)     Chronic gouty arthropathy     Chronic kidney disease     Congestive heart failure     CVA (cerebrovascular accident)     Essential tremor

## 2023-11-07 NOTE — CONSULTS
[address]  [phone]      Chief Complaint   Patient presents with    Fatigue     Pt c/o feeling fatigued and weak, started today. C/o chills, unsure of fever. Denies cough, nausea, vomiting or diarrhea. Pt states she got a flu shot this morning. Reason for consult:  heart failure    ASSESSMENT AND PLAN:    Assessment  Fatigue  Chills  Acute on chronic combined S/D heart failure  CAD s/p CABG, s/p PCI  Persistent AF s/p Watchman - currently in sinus  Myelodysplastic syndrome  s/p flu shot 11/6  Myalgias  Elevated troponin, flat trend NOT consistent with ACS  Abnormal ECG c/w lateral ischemia - unchanged from prior  CKD stage 3a  HTN  Mixed HDL  s/p pacemaker    Discussion  She has a lot of different medical issues going on at the same time. She is clearly in an acute heart failure exacerbation, although that wasn't her initial reason for presentation. Her MDS is going to greatly limit options of invasive cardiac procedures for her. She clearly needs IV diuretics  Her last echo was about a year ago, so she is due for an update regardless; additionally, evaluation of new WMA or change in LVEF will affect her prognosis and decision-making significantly    Plan  - IV lasix  - Strict I/O  - Closely monitor renal function, 'lytes, telemetry while aggressively IV diuresing.  - She does NOT require any blood thinners for her AF since she is s/p Watchman (And they would be relatively contraindicated anyway given her MDS and transfusion-dependent anemia)  - Repeat echo  - It has been > 1 year since her last PCI so she no longer needs Plavix    - Further recs pending clinical response to treatment and also results of echocardiogram    - Low threshold to consider palliative care pending overall prognosis of her MDS.   Recommend hem/onc consult    History of Present Illness:  Marsh Cowden is a 80 y.o. patient with history of CAD s/p CABG/ s/p high-risk PCI about 1 year ago to [facility] and Watchman device    Holter/Event monitor  No results found for this or any previous visit. Old notes reviewed  Telemetry reviewd  Ekg personally reviewed  Chest xray personally reviewed  Echo, stress, cath, and/or other cardiac testing reviewed in detail   Medications and labs reviewed    [x] High (any 2)    A. Problems (any 1)  [x] Acute/Chronic Illness/injury posing threat to life or bodily function:  heart failure  [] Severe exacerbation of chronic illness:    ---------------------------------------------------------------------  B. Risk of Treatment (any 1)   [x] Drugs/treatments that require intensive monitoring for toxicity include:  IV lasix  [] Change in code status:    [] Decision to escalate care:    [] Major surgery/procedure with associated risk factors:    ----------------------------------------------------------------------  C. Data (any 2)  [x] Discussed management of the case with:  Hospitalist  [x] Imaging personally reviewed and interpreted, includes:  cxr, chest CT  [x] Data Review (any 3)  [x] Collateral history obtained from:  daughter  [x] All available Consultant notes from yesterday/today were reviewed  [x] All current labs were reviewed and interpreted for clinical significance   [x] Appropriate follow-up labs were ordered    Thank you for allowing to us to participate in the care or Michelle Anger. Further evaluation will be based upon the patient's clinical course and testing results. All questions and concerns were addressed to the patient/family. Alternatives to my treatment were discussed.      Elsie Luna MD, MD 11/7/2023 7:52 AM

## 2023-11-07 NOTE — PLAN OF CARE
Plavix stopped given that last stent was > 12 months ago especially given risk of bleeding in a patient who is transfusion-dependent

## 2023-11-07 NOTE — ED NOTES
Called echo, aware of the order.   Spoke to Felipe, [facility] nurse aware of consult     Medina Geronimo, LAQUITA  11/07/23 8246

## 2023-11-08 ENCOUNTER — APPOINTMENT (OUTPATIENT)
Dept: GENERAL RADIOLOGY | Age: 83
End: 2023-11-08
Payer: MEDICARE

## 2023-11-08 PROBLEM — E43 SEVERE MALNUTRITION (HCC): Status: ACTIVE | Noted: 2022-11-29

## 2023-11-08 LAB
ANION GAP SERPL CALCULATED.3IONS-SCNC: 16 MMOL/L (ref 3–16)
ANISOCYTOSIS BLD QL SMEAR: ABNORMAL
BUN SERPL-MCNC: 41 MG/DL (ref 7–20)
CALCIUM SERPL-MCNC: 8.5 MG/DL (ref 8.3–10.6)
CHLORIDE SERPL-SCNC: 111 MMOL/L (ref 99–110)
CHOLEST SERPL-MCNC: 61 MG/DL (ref 0–199)
CO2 SERPL-SCNC: 19 MMOL/L (ref 21–32)
CREAT SERPL-MCNC: 1.5 MG/DL (ref 0.6–1.2)
DEPRECATED RDW RBC AUTO: 25.1 % (ref 12.4–15.4)
DEPRECATED RDW RBC AUTO: 25.7 % (ref 12.4–15.4)
FERRITIN SERPL IA-MCNC: 619.1 NG/ML (ref 15–150)
GFR SERPLBLD CREATININE-BSD FMLA CKD-EPI: 34 ML/MIN/{1.73_M2}
GLUCOSE SERPL-MCNC: 97 MG/DL (ref 70–99)
HCT VFR BLD AUTO: 25.3 % (ref 36–48)
HCT VFR BLD AUTO: 25.4 % (ref 36–48)
HDLC SERPL-MCNC: 34 MG/DL (ref 40–60)
HGB BLD-MCNC: 7.9 G/DL (ref 12–16)
HGB BLD-MCNC: 7.9 G/DL (ref 12–16)
HYPOCHROMIA BLD QL SMEAR: ABNORMAL
LDLC SERPL CALC-MCNC: 14 MG/DL
LEGIONELLA AG UR QL: NORMAL
MACROCYTES BLD QL SMEAR: ABNORMAL
MAGNESIUM SERPL-MCNC: 2.3 MG/DL (ref 1.8–2.4)
MCH RBC QN AUTO: 30.2 PG (ref 26–34)
MCH RBC QN AUTO: 30.3 PG (ref 26–34)
MCHC RBC AUTO-ENTMCNC: 31.2 G/DL (ref 31–36)
MCHC RBC AUTO-ENTMCNC: 31.3 G/DL (ref 31–36)
MCV RBC AUTO: 96.7 FL (ref 80–100)
MCV RBC AUTO: 97.2 FL (ref 80–100)
MRSA DNA SPEC QL NAA+PROBE: NORMAL
OVALOCYTES BLD QL SMEAR: ABNORMAL
PATH INTERP BLD-IMP: NO
PATH INTERP BLD-IMP: NO
PLATELET # BLD AUTO: 218 K/UL (ref 135–450)
PLATELET # BLD AUTO: 220 K/UL (ref 135–450)
PLATELET BLD QL SMEAR: ADEQUATE
PMV BLD AUTO: 8.8 FL (ref 5–10.5)
PMV BLD AUTO: 9.1 FL (ref 5–10.5)
POLYCHROMASIA BLD QL SMEAR: ABNORMAL
POTASSIUM SERPL-SCNC: 3.8 MMOL/L (ref 3.5–5.1)
RBC # BLD AUTO: 2.61 M/UL (ref 4–5.2)
RBC # BLD AUTO: 2.61 M/UL (ref 4–5.2)
S PNEUM AG UR QL: NORMAL
SLIDE REVIEW: ABNORMAL
SODIUM SERPL-SCNC: 146 MMOL/L (ref 136–145)
T4 FREE SERPL-MCNC: 1.5 NG/DL (ref 0.9–1.8)
TRIGL SERPL-MCNC: 66 MG/DL (ref 0–150)
TSH SERPL DL<=0.005 MIU/L-ACNC: 0.08 UIU/ML (ref 0.27–4.2)
VLDLC SERPL CALC-MCNC: 13 MG/DL
WBC # BLD AUTO: 0.4 K/UL (ref 4–11)
WBC # BLD AUTO: 0.4 K/UL (ref 4–11)

## 2023-11-08 PROCEDURE — 92610 EVALUATE SWALLOWING FUNCTION: CPT

## 2023-11-08 PROCEDURE — 6360000002 HC RX W HCPCS: Performed by: INTERNAL MEDICINE

## 2023-11-08 PROCEDURE — 83735 ASSAY OF MAGNESIUM: CPT

## 2023-11-08 PROCEDURE — 80048 BASIC METABOLIC PNL TOTAL CA: CPT

## 2023-11-08 PROCEDURE — 6360000002 HC RX W HCPCS: Performed by: NURSE PRACTITIONER

## 2023-11-08 PROCEDURE — 97162 PT EVAL MOD COMPLEX 30 MIN: CPT

## 2023-11-08 PROCEDURE — 85025 COMPLETE CBC W/AUTO DIFF WBC: CPT

## 2023-11-08 PROCEDURE — 92611 MOTION FLUOROSCOPY/SWALLOW: CPT

## 2023-11-08 PROCEDURE — 97166 OT EVAL MOD COMPLEX 45 MIN: CPT

## 2023-11-08 PROCEDURE — 97535 SELF CARE MNGMENT TRAINING: CPT

## 2023-11-08 PROCEDURE — 82728 ASSAY OF FERRITIN: CPT

## 2023-11-08 PROCEDURE — 2580000003 HC RX 258: Performed by: INTERNAL MEDICINE

## 2023-11-08 PROCEDURE — 1200000000 HC SEMI PRIVATE

## 2023-11-08 PROCEDURE — 99232 SBSQ HOSP IP/OBS MODERATE 35: CPT | Performed by: NURSE PRACTITIONER

## 2023-11-08 PROCEDURE — 36415 COLL VENOUS BLD VENIPUNCTURE: CPT

## 2023-11-08 PROCEDURE — 2580000003 HC RX 258: Performed by: NURSE PRACTITIONER

## 2023-11-08 PROCEDURE — 93306 TTE W/DOPPLER COMPLETE: CPT

## 2023-11-08 PROCEDURE — 74230 X-RAY XM SWLNG FUNCJ C+: CPT

## 2023-11-08 PROCEDURE — 97530 THERAPEUTIC ACTIVITIES: CPT

## 2023-11-08 PROCEDURE — 6370000000 HC RX 637 (ALT 250 FOR IP): Performed by: NURSE PRACTITIONER

## 2023-11-08 PROCEDURE — 92523 SPEECH SOUND LANG COMPREHEN: CPT

## 2023-11-08 PROCEDURE — 92526 ORAL FUNCTION THERAPY: CPT

## 2023-11-08 PROCEDURE — 97116 GAIT TRAINING THERAPY: CPT

## 2023-11-08 RX ADMIN — ROSUVASTATIN CALCIUM 5 MG: 10 TABLET, FILM COATED ORAL at 21:46

## 2023-11-08 RX ADMIN — SODIUM CHLORIDE, PRESERVATIVE FREE 10 ML: 5 INJECTION INTRAVENOUS at 21:47

## 2023-11-08 RX ADMIN — TOPIRAMATE 100 MG: 100 TABLET, FILM COATED ORAL at 21:47

## 2023-11-08 RX ADMIN — FUROSEMIDE 40 MG: 10 INJECTION, SOLUTION INTRAMUSCULAR; INTRAVENOUS at 09:59

## 2023-11-08 RX ADMIN — HEPARIN SODIUM 5000 UNITS: 5000 INJECTION INTRAVENOUS; SUBCUTANEOUS at 05:25

## 2023-11-08 RX ADMIN — HEPARIN SODIUM 5000 UNITS: 5000 INJECTION INTRAVENOUS; SUBCUTANEOUS at 21:47

## 2023-11-08 RX ADMIN — CEFEPIME 1000 MG: 1 INJECTION, POWDER, FOR SOLUTION INTRAMUSCULAR; INTRAVENOUS at 10:16

## 2023-11-08 RX ADMIN — SODIUM CHLORIDE, PRESERVATIVE FREE 10 ML: 5 INJECTION INTRAVENOUS at 10:16

## 2023-11-08 RX ADMIN — ALLOPURINOL 100 MG: 100 TABLET ORAL at 09:59

## 2023-11-08 RX ADMIN — HEPARIN SODIUM 5000 UNITS: 5000 INJECTION INTRAVENOUS; SUBCUTANEOUS at 15:34

## 2023-11-08 RX ADMIN — LOSARTAN POTASSIUM 12.5 MG: 25 TABLET, FILM COATED ORAL at 09:59

## 2023-11-08 RX ADMIN — CEFEPIME 1000 MG: 1 INJECTION, POWDER, FOR SOLUTION INTRAMUSCULAR; INTRAVENOUS at 21:47

## 2023-11-08 RX ADMIN — TOPIRAMATE 100 MG: 100 TABLET, FILM COATED ORAL at 09:59

## 2023-11-08 ASSESSMENT — PAIN SCALES - GENERAL: PAINLEVEL_OUTOF10: 0

## 2023-11-08 NOTE — CARE COORDINATION
Case Management Assessment  Initial Evaluation    Date/Time of Evaluation: 11/8/2023 11:33 AM  Assessment Completed by: Luis King    If patient is discharged prior to next notation, then this note serves as note for discharge by case management. Patient Name: Rod Ortiz                   YOB: 1940  Diagnosis: Generalized weakness [R53.1]  Myelodysplasia (myelodysplastic syndrome) ([address]) [D46.9]  Bilateral pleural effusion [J90]  Acute on chronic systolic CHF (congestive heart failure) (HCC) [I50.23]  Hypervolemia, unspecified hypervolemia type [E87.70]                   Date / Time: 11/6/2023  8:30 PM    Patient Admission Status: Inpatient   Readmission Risk (Low < 19, Mod (19-27), High > 27): Readmission Risk Score: 24.4    Current PCP: Heidi Gallardo MD  PCP verified by CM? (P) Yes    Chart Reviewed: Yes      History Provided by: (P) Child/Family (Diania Daughter)  Patient Orientation: Other (see comment)    Patient Cognition:      Hospitalization in the last 30 days (Readmission):  No    If yes, Readmission Assessment in CM Navigator will be completed. Advance Directives:      Code Status: Full Code   Patient's Primary Decision Maker is: (P) Legal Next of Kin    Primary Decision MakerElvin Fatimah Child - [phone]    Secondary Decision Maker: Alan Etienne - Child - [phone]    Supplemental (Other) Decision Maker: Joi Nava - Child - [phone]    Discharge Planning:    Patient lives with: (P) Children Type of Home: House  Primary Care Giver: (P) Family (Dtr. providing alot of care at home due to weakness.  Pt has her own home, but has been staying with  dtr.)  Patient Support Systems include: (P) Family Members, Children, Friends/Neighbors   Current Financial resources: (P) Medicare  Current community resources: (P) None  Current services prior to admission: (P) [facility]            Current DME: (P) [employer]

## 2023-11-08 NOTE — PROGRESS NOTES
Scientific PPM (11/13); s/p gen change (1/21)  - I reviewed device interrogation today. Device functioning normally.   ~ A-paced 88% V-paced 6%  ~ 12.5 years left on battery life expectancy  - Discussed with patient  - Follow up with device clinic as scheduled    3. Non-sustained atrial tachycardia  - Detected by device. Brief episodes  - Stable  - Continue BB    4. Chronic systolic heart failure (NYHA Class III)  - Appears compensated   ~ EF 55% per echo (1/23)  - Continue with BB, ARB, torsemide  - Discussed with patient importance of monitoring weight, low sodium diet and fluid restriction  - Followed by CHF team    5. CAD  - Hx of CABG   ~ S/p PCI to CX and LAD (10/22)    - Stable  - No complaints of angina  - Continue BB, and statin   ~ She is off ASA/plavix due to MDS. No ACE/ARB due to CKD  - Followed by Dr. Chente Hutchins    6. HTN  - Controlled: Goal <140/80  - Continue current medications  - Encouraged to monitor and log BP readings at home, then bring log to next visit  - Discussed importance of low sodium diet, weight control and exercise    7. MDS   - Followed by oncology    Plan:  1. Ask Dr. Rachel Reagan if ok to resume aspirin 81 mg daily  2. Device checks every 3 months    F/U: Follow-up with CHF in March as scheduled and EP in 8 months  -Follow up with device clinic as scheduled  -Call [facility] at [phone] with any questions    Diet & Exercise: The patient is counseled to follow a low salt diet to assure blood pressure remains controlled for cardiovascular risk factor modification  The patient is counseled to avoid excess caffeine, and energy drinks as this may exacerbated ectopy and arrhythmia  The patient is counseled to lose weight to control cardiovascular risk factors  Exercise program discussed: To improve overall cardiovascular health, the patient is instructed to increase cardiovascular related activities with a goal of 150 min/week of moderate level activity or 10,000 steps per day.

## 2023-11-08 NOTE — PROGRESS NOTES
Facility/Department: [address]  SLP Clinical Swallow Evaluation and Speech Language Cognitive Assessment     Patient: Rebekah Thomas   : 1940   MRN: [id number]      Evaluation Date: 2023      Admitting Dx: Generalized weakness [R53.1]  Myelodysplasia (myelodysplastic syndrome) ([facility]) [D46.9]  Bilateral pleural effusion [J90]  Acute on chronic systolic CHF (congestive heart failure) (HCC) [I50.23]  Hypervolemia, unspecified hypervolemia type [E87.70]  Pain: Denies                                  H&P: Rebeakh Thomas is a 80 y.o. female with a pmh of myelodysplastic syndrome on treatment, chronic systolic CHF, hypertension, hyperlipidemia, hypothyroid, chronic A-fib, status post Watchman device, CKD 3 who presents with generalized weakness. Found to be in CHF exacerbation. Imaging:  Chest X-ray:   23  IMPRESSION:  Cardiomegaly with pulmonary vascular congestion and trace bilateral pleural  effusions. No superimposed consolidation. History/Prior Level of Function:   Living Status: Private residence with daughter  Prior Dysphagia History: No prior dysphagia history noted per chart review. Currently nurse reports difficulty with swallowing function. However, Pt denies swallowing difficulty at this time. Prior Speech History: Pt participated in outpatient speech therapy here from 23-23 for mild-moderate vocal impairment. Reason for referral: SLP evaluation orders received due to pt/family reports of trouble swallowing . DYSPHAGIA BEDSIDE SWALLOW EVALUATION   Dysphagia Impressions/Dysphagia Diagnosis: Oropharyngeal Dysphagia   Pt positioned upright in bed, alert, oriented to location and self, on room air. Pt denied any swallowing problems recently aside from currently having a sore throat. Oral mechanism evaluation revealed reduced labial and lingual coordination, reduced lingual ROM, adequate labial symmetry, and adequate labial ROM.  Trials of thin liquids (via GOALS  Pt will functionally tolerate ongoing assessment of swallow function with diet to be determined as indicated   Pt will demonstrate understanding of aspiration risk and precautions via education/demonstration with occasional prompting  Pt will advance to least restrictive diet as indicated   Pt will participate in Modified Barium swallow study to further assess pharyngeal phase of swallow, assist with diet recommendations and to further direct plan of care     Patient Positioning: Upright in bed       SPEECH LANGUAGE COGNITIVE ASSESSMENT:     Speech Diagnosis:    Dysarthria , Cognitive-Linguistic Deficits     Impressions: Pt demonstrates mild dysarthria with reduced articulatory precision and reduced consistent and sustained phonation noted in connected speech. Auditory comprehension is largely [facility] for basic commands and questions. No overt expressive aphasia noted. Moderately impaired verbal expression noted for high level naming and for thought organization for open ended self expression. Moderate/severe cognitive linguistic deficits noted for orientation, immediate and short term recall and basic verbal problem solving. Current speech language and cognitive linguistic function appears declined from the Pt's reported baseline function. Therefore, ongoing SLP treatment intervention is indicated.        COMPREHENSION  Auditory Comprehension: Within functional limits   Functional Ability to Comprehend Basic Commands/Questions     EXPRESSION  Verbal Expression: Moderate   Impaired Divergent Naming  Impaired Thought Organization      Pragmatics/Social Functioning: Within functional limits          MOTOR SPEECH  Motor Speech: Mild   Dysarthria: Imprecise articulation  and Decreased breath support      VOICE  Voice: Mild   Breath Support  Dysphonic     COGNITION    Overall Orientation : Moderate    Disoriented to time   Disoriented to situation   Oriented to self  Oriented to place    Attention: Mild    Impaired

## 2023-11-08 NOTE — PROGRESS NOTES
cooperating in the care of this individual.    Gosia Bella, TIFFANIE - CNP, ACNP, [facility] 11/8/2023, 8:42 AM  Heart Failure  The [facility]  [facility], [address] [facility] Summer Alvarez, [address]  Ph: [phone]

## 2023-11-08 NOTE — PROGRESS NOTES
[address] [facility] Department   Phone: [phone]    Occupational Therapy    [x] Initial Evaluation            [] Daily Treatment Note         [] Discharge Summary      Patient: Julienne Soliman   :    MRN: [id number]   Date of Service:  2023    Admitting Diagnosis:  Acute on chronic systolic CHF (congestive heart failure) [facility])  Current Admission Summary: Julienne Soliman is a 80 y.o. female with a pmh of myelodysplastic syndrome on treatment, chronic systolic CHF, hypertension, hyperlipidemia, hypothyroid, chronic A-fib, status post Watchman device, CKD 3 who presents with generalized weakness. Found to be in CHF exacerbation. Past Medical History:  has a past medical history of Aortic valve insufficiency, Arthritis, ASD (atrial septal defect), Atrial fibrillation ([address]), CAD (coronary artery disease), Cancer ([address]), Chronic gouty arthropathy, Chronic kidney disease, Congestive heart failure, CVA (cerebrovascular accident), Essential tremor, Gout, Hyperlipidemia, Hypertension, Hypothyroidism, Intervertebral disc disease, Ischemic cardiomyopathy, Mitral valve stenosis, Myelodysplastic syndrome ([address]), Peptic ulcer, and TIA (transient ischemic attack). Past Surgical History:  has a past surgical history that includes back surgery; Cholecystectomy; Cardiac surgery; Coronary artery bypass graft (); shoulder surgery; Cardiac catheterization (2012); hip surgery (Left, 2017); joint replacement; Cardiac catheterization (2018); Percutaneous Transluminal Coronary Angio (2014); pr njx aa&/strd tfrml epi lumbar/sacral 1 level (Right, 2018); Femoral-femoral Bypass Graft (N/A, 2019); femoral bypass (Left, 2019); IR KYPHOPLASTY LUMBAR 1 VERTEBRAL BODY (2021); pacemaker placement; and Insertable Cardiac Monitor. Discharge Recommendations: Julienne Soliman scored a 15/24 on the AM-PAC ADL Inpatient form.  Current research shows transfer training, gait training, endurance training, patient/caregiver education, ADL/self-care training, and safety education    Goals  Patient Goals: to return home   Short Term Goals:  Time Frame: prior to D/C  Patient will complete upper body ADL at stand by assistance   Patient will complete lower body ADL at stand by assistance   Patient will complete toileting at stand by assistance   Patient will complete grooming at stand by assistance   Patient will complete functional transfers at contact guard assistance   Patient will complete functional mobility at contact guard assistance   Patient will complete bed mobility at stand by assistance   Patient to maintain standing at contact guard assistance for 7-10 minutes. Above goals reviewed on 11/8/2023. All goals are ongoing at this time unless indicated above.        Therapy Session Time     Individual Group Co-treatment   Time In    5374   Time Out    1540   Minutes    55        Timed Code Treatment Minutes:   40 minutes  Total Treatment Minutes:  55 minutes       Electronically Signed By: PIPO Gerardo OTR/L [id number]

## 2023-11-08 NOTE — PROGRESS NOTES
Nutrition Note    RECOMMENDATIONS  PO Diet: Dysphagia minced & moist, DARA diet with mildly thick liquids  ONS: Magic cup BID     NUTRITION ASSESSMENT   Pt is severely malnourished AEB physical signs of muscle & fat tissue wasting identified. Nutrition intervention triggered by wt loss & poor appetite reported on admission. Pt states she eats pretty good & receives a Dysphagia minced & moist diet with mildly thick liquids. Wt has fluctuated between 105-111 lb over past year, with pt reporting  lb (verified per EMR 6 months prior). Encouraged small frequent meals & ONS to help increase po intake. Pt does not care for Ensure, but agrees to try Magic cup BID. Has hx CHF & denies education needs, reporting she is compliant with Na & fluid restrictions. Will add DARA modifier & monitor for po/ supplement intake. Nutrition Related Findings: No edema noted; Na 146; +BS  Wounds: None  Nutrition Education:  Education declined   Nutrition Goals: PO intake 50% or greater     MALNUTRITION ASSESSMENT   Chronic Illness  Malnutrition Status: Severe malnutrition  Findings of the 6 clinical characteristics of malnutrition:  Energy Intake:  Mild decrease in energy intake (Comment)  Weight Loss:  Mild weight loss (specify amount and time period) (5.4% loss in 6 months)     Body Fat Loss:  Severe body fat loss Orbital, Buccal region   Muscle Mass Loss:  Severe muscle mass loss Clavicles (pectoralis & deltoids)  Fluid Accumulation:  No significant fluid accumulation     Strength:  Not Performed      NUTRITION DIAGNOSIS   Severe malnutrition related to inadequate protein-energy intake as evidenced by poor intake prior to admission, weight loss, severe loss of subcutaneous fat, severe muscle loss      CURRENT NUTRITION THERAPIES  ADULT DIET;  Dysphagia - Minced and Moist; Mildly Thick ([place])     PO Intake: Unable to assess   PO Supplement Intake:None Ordered    ANTHROPOMETRICS  Current Height: 160 cm (5' 3\")  Current

## 2023-11-08 NOTE — PROGRESS NOTES
6:30pm: Got report from floor nurse that was leaving at this time. Was told by going RN that patient \"wanted to just sleep if she was able. \" Patient is resting in bed, quietly.

## 2023-11-08 NOTE — CONSULTS
[facility]    Consult Note      Requesting Provider:  Leslie Wilks MD    CHIEF COMPLAINT:  Generalized weakness      HISTORY OF PRESENT ILLNESS:      Ms. Ronnell Rodriguez  is a 80 y.o. female who is admitted with CHF exacerbation. She presented to the hospital with generalized weakness. She had temperature of 99.1 on admission. CXR demonstrated cardiomegaly with pulmonary vascular congestion and trace bilateral pleural effusion. She is a patient of Dr. Jus Murray with pancytopenia due to myelodysplastic syndrome. She started Pramod [place] on 10/16/2023, this is given monthly. She started weekly Retacrit injections on 10/26/2023. She received PRBC transfusion on 11/3/2023 for hgb of 6.8. She appears weak and is confused this morning.  She cannot tell me when or why she came to the hospital.     Past Medical History:    Past Medical History:   Diagnosis Date    Aortic valve insufficiency     Arthritis     ASD (atrial septal defect)     Atrial fibrillation (HCC)     CAD (coronary artery disease)     Cancer (HCC)     Chronic gouty arthropathy     Chronic kidney disease     Congestive heart failure     CVA (cerebrovascular accident)     Essential tremor     Gout     Hyperlipidemia     Hypertension     Hypothyroidism     Intervertebral disc disease     Ischemic cardiomyopathy     Mitral valve stenosis     Myelodysplastic syndrome (HCC)     Peptic ulcer     TIA (transient ischemic attack)        Past Surgical History:    Past Surgical History:   Procedure Laterality Date    BACK SURGERY      CARDIAC CATHETERIZATION  07/2012    CARDIAC CATHETERIZATION  08/07/2018    Unsuccesful  of RCA    CARDIAC SURGERY      CABG & Cardiac ablation    CHOLECYSTECTOMY      CORONARY ARTERY BYPASS GRAFT  1987    LIMA- Diag/LAD, SVG- RCA    FEMORAL BYPASS Left 08/22/2019    LEFT FEMORAL TO POPLITEAL BYPASS GRAFT performed by chloride infusion   IntraVENous PRN Alice Carver, TIFFANIE - BRIA        ondansetron (ZOFRAN-ODT) disintegrating tablet 4 mg  4 mg Oral Q8H PRN Alice Carver APRN - CNP        Or    ondansetron (ZOFRAN) injection 4 mg  4 mg IntraVENous Q6H PRN Alice Carver, TIFFANIE - CNP        polyethylene glycol (GLYCOLAX) packet 17 g  17 g Oral Daily PRN Alice Carver, TIFFANIE - BRIA        acetaminophen (TYLENOL) tablet 650 mg  650 mg Oral Q6H PRN Alice Carver, TIFFANIE - CNP        Or    acetaminophen (TYLENOL) suppository 650 mg  650 mg Rectal Q6H PRN Alice Carver, APRN - CNP   650 mg at 11/07/23 1352    perflutren lipid microspheres (DEFINITY) injection 1.5 mL  1.5 mL IntraVENous ONCE PRN Alice Carver APRN - CNP        heparin (porcine) injection 5,000 Units  5,000 Units SubCUTAneous 3 times per day Branden Carver APRN - CNP   5,000 Units at 11/08/23 0525    furosemide (LASIX) injection 40 mg  40 mg IntraVENous Daily Alice Carver APRN - CNP   40 mg at 11/07/23 1000    cefTRIAXone (ROCEPHIN) 1,000 mg in sodium chloride 0.9 % 50 mL IVPB (mini-bag)  1,000 mg IntraVENous Q24H Wellington Phillip MD   Stopped at 11/07/23 1421    azithromycin (ZITHROMAX) 500 mg in sodium chloride 0.9 % 250 mL IVPB ([id number])  500 mg IntraVENous Q24H Wellington Phillip MD   Stopped at 11/07/23 1339     Allergies:     Allergies   Allergen Reactions    Aspirin Nausea Only    Diltiazem Anaphylaxis    Diltiazem Hcl Anaphylaxis    Lorazepam Other (See Comments)     hallucinations  hallucinations  hallucinations    Sulfa Antibiotics Rash and Hives    Atorvastatin Other (See Comments)     Muscle pains  Muscle pains  Muscle pains    Dabigatran Nausea Only     And indigestion  And indigestion    Aka Pradaxa  And indigestion  And indigestion  And indigestion    Aka Pradaxa  And indigestion  And indigestion  And indigestion    Aka Pradaxa    Dabrafenib     Lisinopril      cough    Mysoline [Primidone]     Nsaids      Other reaction(s): Unknown

## 2023-11-08 NOTE — PROGRESS NOTES
Patient VSS, CHF education provided to patient and family, I&O's monitored. Safety precautions in place.  Will continue to monitor

## 2023-11-08 NOTE — PALLIATIVE CARE
Palliative Care: Attempt to visit with patient X2. Therapy in room. Will continue to follow.      Electronically signed by Yesy Parker RN on 11/8/23 at 4:00 PM EST

## 2023-11-08 NOTE — PROGRESS NOTES
[address] [facility] Department   Phone: [phone]    Physical Therapy    [x] Initial Evaluation            [] Daily Treatment Note         [] Discharge Summary      Patient: Enedina Bryant   : [id number]   MRN: [id number]   Date of Service:  2023  Admitting Diagnosis: Acute on chronic systolic CHF (congestive heart failure) [facility])  Current Admission Summary: Enedina Bryant is a 80 y.o. female with a pmh of myelodysplastic syndrome on treatment, chronic systolic CHF, hypertension, hyperlipidemia, hypothyroid, chronic A-fib, status post Watchman device, CKD 3 who presents with generalized weakness. Found to be in CHF exacerbation. Past Medical History:  has a past medical history of Aortic valve insufficiency, Arthritis, ASD (atrial septal defect), Atrial fibrillation ([address]), CAD (coronary artery disease), Cancer ([address]), Chronic gouty arthropathy, Chronic kidney disease, Congestive heart failure, CVA (cerebrovascular accident), Essential tremor, Gout, Hyperlipidemia, Hypertension, Hypothyroidism, Intervertebral disc disease, Ischemic cardiomyopathy, Mitral valve stenosis, Myelodysplastic syndrome ([address]), Peptic ulcer, and TIA (transient ischemic attack). Past Surgical History:  has a past surgical history that includes back surgery; Cholecystectomy; Cardiac surgery; Coronary artery bypass graft (); shoulder surgery; Cardiac catheterization (2012); hip surgery (Left, 2017); joint replacement; Cardiac catheterization (2018); Percutaneous Transluminal Coronary Angio (2014); pr njx aa&/strd tfrml epi lumbar/sacral 1 level (Right, 2018); Femoral-femoral Bypass Graft (N/A, 2019); femoral bypass (Left, 2019); IR KYPHOPLASTY LUMBAR 1 VERTEBRAL BODY (2021); pacemaker placement; and Insertable Cardiac Monitor. Discharge Recommendations: Enedina Bryant scored a 15/24 on the AM-PAC short mobility form.  Current research shows that an

## 2023-11-08 NOTE — PLAN OF CARE
Problem: ABCDS Injury Assessment  Goal: Absence of physical injury  Outcome: [address] [address] Progressing  Flowsheets (Taken 11/8/2023 1745)  Absence of Physical Injury: Implement safety measures based on patient assessment     Problem: Skin/Tissue Integrity  Goal: Absence of new skin breakdown  Description: 1. Monitor for areas of redness and/or skin breakdown  2. Assess vascular access sites hourly  3. Every 4-6 hours minimum:  Change oxygen saturation probe site  4. Every 4-6 hours:  If on nasal continuous positive airway pressure, respiratory therapy assess nares and determine need for appliance change or resting period.   Outcome: [address] [facility] [address] Progressing     Problem: Chronic Conditions and Co-morbidities  Goal: Patient's chronic conditions and co-morbidity symptoms are monitored and maintained or improved  Outcome: [address] (Taken 11/8/2023 1745)  Care Plan - Patient's Chronic Conditions and Co-Morbidity Symptoms are Monitored and Maintained or Improved:   Monitor and assess patient's chronic conditions and comorbid symptoms for stability, deterioration, or improvement   Collaborate with multidisciplinary team to address chronic and comorbid conditions and prevent exacerbation or deterioration   Update acute care plan with appropriate goals if chronic or comorbid symptoms are exacerbated and prevent overall improvement and discharge     Problem: Discharge Planning  Goal: Discharge to home or other facility with appropriate resources  Outcome: [address] (Taken 11/8/2023 1745)  Discharge to home or other facility with appropriate resources:   Identify barriers to discharge with patient and caregiver   Arrange for needed discharge resources and transportation as appropriate   Identify discharge learning needs (meds, wound care, etc)   Arrange for interpreters to assist at discharge as needed   Refer to discharge planning if patient needs post-hospital services based on

## 2023-11-08 NOTE — PROGRESS NOTES
HOSPITALISTS PROGRESS NOTE    11/8/2023 8:37 AM        Name: Remington Sanchez . Admitted: 11/6/2023  Primary Care Provider: Susu Stout MD (Tel: [phone])      Brief Course: This 80 y.o. female with PMHx of myelodysplastic syndrome on treatment, chronic systolic CHF, hypertension, hyperlipidemia, hypothyroid, chronic A-fib, status post Watchman device, CKD 3 who presents with generalized weakness. Found to be in CHF exacerbation. Interval history:   Pt seen and examined today   Overnight events noted and interval ancillary notes reviewed. On room air satting well. Febrile overnight with Tmax of 100.3, WBCs remain low around 0.4K  Diuresing with IV Lasix; creatinine up to 1.5  Pt resting in bed; reported generalized weakness and status usual medical history  Status post MBS showed penetration/aspiration to level of cords      Assessment & Plan:     Pancytopenia due to MDS; WBC of 0.4. Hemoglobin 7.5. S/p blood transfusion on 11/3. Follows with oncology;Started Vidaza injections on 10/16/2023, next due 11/13/2023. On Retacrit 40,000 units weekly for anemia, next due 11/9/2023  Iron studies c/w iron deficiency and AOCD. Vitamin B12 and folate normal  Monitor CBC closely. Transfuse PRBC if hgb <7  Continue neutropenic precautions. Neutropenic fever; Low grade temp of 100.3 yesterday   Flu and COVID, MRSA nares negative. CXR -ve for pneumonia UA negative. Blood cxs in progress. Continue empiric cefepime    Concern for aspiration; MBS showed penetration/aspiration to level of cords  Speech on board; diet further recs    Acute on chronic CHF;  CXR showed cardiomegaly with pulmonary vascular congestion and trace bilateral pleural effusions. proBNP >15K on admission; on IV diuresis  Cardiology board; appreciate their input. Monitor electrolytes closely while on Lasix and replace as needed     Cardiomyopathy with List  Principal Problem:    Acute on chronic systolic CHF (congestive heart failure) ([address])  Resolved Problems:    * No resolved hospital problems. Dina Yarbrough MD   11/8/2023 8:37 AM      Please note that some part of this chart was generated using Dragon dictation software. Although every effort was made to ensure the accuracy of this automated transcription, some errors in transcription may have occurred inadvertently. If you may need any clarification, please do not hesitate to contact me through [facility].

## 2023-11-08 NOTE — PLAN OF CARE
Problem: ABCDS Injury Assessment  Goal: Absence of physical injury  Outcome: Progressing     Problem: Skin/Tissue Integrity  Goal: Absence of new skin breakdown  Description: 1. Monitor for areas of redness and/or skin breakdown  2. Assess vascular access sites hourly  3. Every 4-6 hours minimum:  Change oxygen saturation probe site  4. Every 4-6 hours:  If on nasal continuous positive airway pressure, respiratory therapy assess nares and determine need for appliance change or resting period.   Outcome: Progressing     Problem: Chronic Conditions and Co-morbidities  Goal: Patient's chronic conditions and co-morbidity symptoms are monitored and maintained or improved  Outcome: Progressing     Problem: Discharge Planning  Goal: Discharge to home or other facility with appropriate resources  Outcome: Progressing     Problem: Safety - Adult  Goal: Free from fall injury  Outcome: Progressing     Problem: Respiratory - Adult  Goal: Achieves optimal ventilation and oxygenation  Outcome: Progressing     Problem: Cardiovascular - Adult  Goal: Maintains optimal cardiac output and hemodynamic stability  Outcome: Progressing     Problem: Metabolic/Fluid and Electrolytes - Adult  Goal: Electrolytes maintained within normal limits  Outcome: Progressing  Goal: Hemodynamic stability and optimal renal function maintained  Outcome: Progressing     Problem: Hematologic - Adult  Goal: Maintains hematologic stability  Outcome: Progressing

## 2023-11-08 NOTE — DISCHARGE INSTRUCTIONS
Follow up with your PCP within 5-7 days of discharge. Follow up with oncology as instructed   Follow up with cardiology as instructed   Take all your medications as prescribed. Guidelines for Heart Failure home management:    1. Continue to monitor weight first thing each morning. You should weigh yourself after using the bathroom and before you eat breakfast.    2. Report to your doctor any significant weight change. Remember that weight change of 2-3 lbs. in 1 day or 5 lbs in a week is \"significant\" and likely represents changes in \"fluid\" status. If you are experiencing any swelling in your feet, ankles or abdomen, or shortness of breath, call your doctor. 3. You should restrict all sodium intake to 3000 milligrams (3 grams) a day. Depending on your status, you may also be asked to restrict fluid intake to no more that 64 oz/2 Liters a day. If uncertain, ask the nurse or physician. 4. Regular aerobic exercise is encouraged 30 minutes a day (walking, bike, swimming, etc.). For specific exercise recommendations, ask your physician. 5. Report to your doctor any change in symptoms (chest pain, worsening shortness of breath, increased dizziness or passing out, increased palpitations or ICD shock, trouble catching breath while lying down, increased edema or abdominal bloating). Remember that even \"minor\" changes in symptoms may be important. Also report any changes in medications including \"over the counter\" medications. 6. DO NOT take NSAID's for pain (i.e, Advil, Aleve, Motrin, ibuprofen, and many more) since these may cause serious problems in those with a history of CHF. If uncertain about the medication, call your doctor. 7. If you have new significant or ongoing diarrhea or vomiting, please call your doctor for further instructions. Taking a diuretic (water pill) with these symptoms can worsen dehydration.      8. If you have any questions or concerns you can always call the CHF

## 2023-11-09 LAB
ANION GAP SERPL CALCULATED.3IONS-SCNC: 15 MMOL/L (ref 3–16)
BUN SERPL-MCNC: 46 MG/DL (ref 7–20)
CALCIUM SERPL-MCNC: 8.4 MG/DL (ref 8.3–10.6)
CHLORIDE SERPL-SCNC: 110 MMOL/L (ref 99–110)
CO2 SERPL-SCNC: 18 MMOL/L (ref 21–32)
CREAT SERPL-MCNC: 1.4 MG/DL (ref 0.6–1.2)
DEPRECATED RDW RBC AUTO: 25.6 % (ref 12.4–15.4)
EKG ATRIAL RATE: 75 BPM
EKG DIAGNOSIS: NORMAL
EKG P AXIS: 94 DEGREES
EKG P-R INTERVAL: 226 MS
EKG Q-T INTERVAL: 430 MS
EKG QRS DURATION: 104 MS
EKG QTC CALCULATION (BAZETT): 480 MS
EKG R AXIS: -15 DEGREES
EKG T AXIS: 130 DEGREES
EKG VENTRICULAR RATE: 75 BPM
GFR SERPLBLD CREATININE-BSD FMLA CKD-EPI: 37 ML/MIN/{1.73_M2}
GLUCOSE SERPL-MCNC: 102 MG/DL (ref 70–99)
HCT VFR BLD AUTO: 24.9 % (ref 36–48)
HGB BLD-MCNC: 7.9 G/DL (ref 12–16)
MAGNESIUM SERPL-MCNC: 2.3 MG/DL (ref 1.8–2.4)
MAGNESIUM SERPL-MCNC: 2.3 MG/DL (ref 1.8–2.4)
MCH RBC QN AUTO: 30.2 PG (ref 26–34)
MCHC RBC AUTO-ENTMCNC: 31.6 G/DL (ref 31–36)
MCV RBC AUTO: 95.6 FL (ref 80–100)
NT-PROBNP SERPL-MCNC: 8116 PG/ML (ref 0–449)
PLATELET # BLD AUTO: 316 K/UL (ref 135–450)
PMV BLD AUTO: 10.6 FL (ref 5–10.5)
POTASSIUM SERPL-SCNC: 3.5 MMOL/L (ref 3.5–5.1)
POTASSIUM SERPL-SCNC: 3.5 MMOL/L (ref 3.5–5.1)
PROCALCITONIN SERPL IA-MCNC: 0.62 NG/ML (ref 0–0.15)
RBC # BLD AUTO: 2.6 M/UL (ref 4–5.2)
SODIUM SERPL-SCNC: 143 MMOL/L (ref 136–145)
WBC # BLD AUTO: 0.4 K/UL (ref 4–11)

## 2023-11-09 PROCEDURE — 84132 ASSAY OF SERUM POTASSIUM: CPT

## 2023-11-09 PROCEDURE — 97530 THERAPEUTIC ACTIVITIES: CPT

## 2023-11-09 PROCEDURE — 99232 SBSQ HOSP IP/OBS MODERATE 35: CPT | Performed by: NURSE PRACTITIONER

## 2023-11-09 PROCEDURE — 85025 COMPLETE CBC W/AUTO DIFF WBC: CPT

## 2023-11-09 PROCEDURE — 1200000000 HC SEMI PRIVATE

## 2023-11-09 PROCEDURE — 36415 COLL VENOUS BLD VENIPUNCTURE: CPT

## 2023-11-09 PROCEDURE — 6370000000 HC RX 637 (ALT 250 FOR IP): Performed by: NURSE PRACTITIONER

## 2023-11-09 PROCEDURE — 97110 THERAPEUTIC EXERCISES: CPT

## 2023-11-09 PROCEDURE — 93010 ELECTROCARDIOGRAM REPORT: CPT | Performed by: INTERNAL MEDICINE

## 2023-11-09 PROCEDURE — 80048 BASIC METABOLIC PNL TOTAL CA: CPT

## 2023-11-09 PROCEDURE — 2580000003 HC RX 258: Performed by: NURSE PRACTITIONER

## 2023-11-09 PROCEDURE — 97116 GAIT TRAINING THERAPY: CPT

## 2023-11-09 PROCEDURE — 84145 PROCALCITONIN (PCT): CPT

## 2023-11-09 PROCEDURE — 83880 ASSAY OF NATRIURETIC PEPTIDE: CPT

## 2023-11-09 PROCEDURE — 93005 ELECTROCARDIOGRAM TRACING: CPT | Performed by: INTERNAL MEDICINE

## 2023-11-09 PROCEDURE — 83735 ASSAY OF MAGNESIUM: CPT

## 2023-11-09 PROCEDURE — 2580000003 HC RX 258: Performed by: INTERNAL MEDICINE

## 2023-11-09 PROCEDURE — 6360000002 HC RX W HCPCS: Performed by: INTERNAL MEDICINE

## 2023-11-09 PROCEDURE — 92526 ORAL FUNCTION THERAPY: CPT

## 2023-11-09 PROCEDURE — 6360000002 HC RX W HCPCS: Performed by: NURSE PRACTITIONER

## 2023-11-09 PROCEDURE — 97129 THER IVNTJ 1ST 15 MIN: CPT

## 2023-11-09 RX ORDER — TORSEMIDE 20 MG/1
20 TABLET ORAL DAILY
Status: DISCONTINUED | OUTPATIENT
Start: 2023-11-09 | End: 2023-11-10

## 2023-11-09 RX ORDER — CARVEDILOL 3.12 MG/1
3.12 TABLET ORAL 2 TIMES DAILY WITH MEALS
Status: DISCONTINUED | OUTPATIENT
Start: 2023-11-09 | End: 2023-11-10 | Stop reason: HOSPADM

## 2023-11-09 RX ADMIN — EPOETIN ALFA-EPBX 40000 UNITS: 40000 INJECTION, SOLUTION INTRAVENOUS; SUBCUTANEOUS at 14:54

## 2023-11-09 RX ADMIN — LOSARTAN POTASSIUM 12.5 MG: 25 TABLET, FILM COATED ORAL at 11:18

## 2023-11-09 RX ADMIN — CEFEPIME 1000 MG: 1 INJECTION, POWDER, FOR SOLUTION INTRAMUSCULAR; INTRAVENOUS at 20:40

## 2023-11-09 RX ADMIN — CEFEPIME 1000 MG: 1 INJECTION, POWDER, FOR SOLUTION INTRAMUSCULAR; INTRAVENOUS at 11:37

## 2023-11-09 RX ADMIN — TOPIRAMATE 100 MG: 100 TABLET, FILM COATED ORAL at 20:31

## 2023-11-09 RX ADMIN — SODIUM CHLORIDE 25 ML: 9 INJECTION, SOLUTION INTRAVENOUS at 11:36

## 2023-11-09 RX ADMIN — ALLOPURINOL 100 MG: 100 TABLET ORAL at 11:18

## 2023-11-09 RX ADMIN — HEPARIN SODIUM 5000 UNITS: 5000 INJECTION INTRAVENOUS; SUBCUTANEOUS at 20:40

## 2023-11-09 RX ADMIN — ROSUVASTATIN CALCIUM 5 MG: 10 TABLET, FILM COATED ORAL at 20:31

## 2023-11-09 RX ADMIN — SODIUM CHLORIDE, PRESERVATIVE FREE 10 ML: 5 INJECTION INTRAVENOUS at 20:30

## 2023-11-09 RX ADMIN — LEVOTHYROXINE SODIUM 112 MCG: 0.11 TABLET ORAL at 05:19

## 2023-11-09 RX ADMIN — TORSEMIDE 20 MG: 20 TABLET ORAL at 14:54

## 2023-11-09 RX ADMIN — CARVEDILOL 3.12 MG: 3.12 TABLET, FILM COATED ORAL at 17:18

## 2023-11-09 RX ADMIN — TOPIRAMATE 100 MG: 100 TABLET, FILM COATED ORAL at 11:17

## 2023-11-09 RX ADMIN — HEPARIN SODIUM 5000 UNITS: 5000 INJECTION INTRAVENOUS; SUBCUTANEOUS at 05:19

## 2023-11-09 RX ADMIN — HEPARIN SODIUM 5000 UNITS: 5000 INJECTION INTRAVENOUS; SUBCUTANEOUS at 14:54

## 2023-11-09 RX ADMIN — SODIUM CHLORIDE, PRESERVATIVE FREE 10 ML: 5 INJECTION INTRAVENOUS at 11:43

## 2023-11-09 ASSESSMENT — PAIN SCALES - GENERAL
PAINLEVEL_OUTOF10: 0
PAINLEVEL_OUTOF10: 0

## 2023-11-09 NOTE — PROGRESS NOTES
Facility/Department: [address]  Speech Language Pathology   Dysphagia and Speech Language/Cognitive Treatment Note    Patient: Kirstin Miller   : 1940   MRN: [id number]      Evaluation Date: 2023      Admitting Dx: Generalized weakness [R53.1]  Myelodysplasia (myelodysplastic syndrome) ([facility]) [D46.9]  Bilateral pleural effusion [J90]  Acute on chronic systolic CHF (congestive heart failure) ([facility]) [I50.23]  Hypervolemia, unspecified hypervolemia type [E87.70]  Treatment Diagnosis:  Dysarthria , Cognitive-Linguistic Deficits , Speech Language Deficits , Oropharyngeal Dysphagia   Pain: Denies                                                Subjective:  Pt awake/alert but much more confused this date. Pt demonstrates no recall of recent events even when prompted. Dysphagia Treatment:   Diet and Treatment Recommendations 2023:  Diet Solids Recommendation:  Dysphagia I Puree  Liquid Consistency Recommendation:  Mildly (nectar) thick liquids /No straws Recommended form of Meds: Meds crushed as able in puree           Compensatory strategies: Upright as possible with all PO intake , No straws , Assist Feed , Small bites/sips , Swallow 2 times per bite , Remain upright 30-45 min     Assessment of Texture Tolerance:  Diet level prior to treatment: Dysphagia II Minced and Moist , Mildly (nectar) thick liquids   Tolerance of Current Diet Level:RN reported pt appears to be tolerating current diet level      -Impressions: Pt was positioned Upright in bed , awake and alert. Currently on room air. Trials of mildly (nectar) thick liquids  and puree  were provided to assess swallow function. Pt noted with improved vocal quality prior to and throughout po trials. Pt accepts only very small sips via cup and bites via tsp.  Improved A-P propulsion and oropharyngeal bolus transfer noted with puree textures vs minced and moist textures with prolonged oral \"holding\" noted with minced and moist. Premature results provided. All information reviewed with the Pt but she continued to verbalize \"but I don't remember that\" even following review of information. Based on today's session recommend continued skilled speech intervention targeting cognitive-linguistic skills to promote return to PLOF    Speech Language Short Term Goals:  Pt will improve speech intelligibility in connected speech via graded tasks to 80% (Ongoing 11/09/23)  Pt will improve orientation and short term recall via graded tasks to 80% (Ongoing 11/09/23)  Pt will improve verbal expression for functional expression via graded tasks to 80% (Ongoing 11/09/23)      Assessment: Patient progressing toward goals    Plan: 3-5 times per week during acute care stay. Patient/Family Education:  Provided education regarding role of SLP, recommendations and general speech pathology plan of care. [] Pt verbalized understanding and agreement   [x] Pt requires ongoing learning   [] No evidence of comprehension     Discharge Recommendations:  Discharge recommendations to be determined pending ongoing follow-up during acute care stay    Treatment time  Timed Code Treatment Minutes: 10 min  Total Treatment time:25 min    If patient discharges prior to next session this note will serve as a discharge summary.       James Palacios [id number]    -

## 2023-11-09 NOTE — PROGRESS NOTES
[address] [facility] Department   Phone: [phone]    Occupational Therapy    [] Initial Evaluation            [x] Daily Treatment Note         [] Discharge Summary      Patient: Michelle Jimenez   :    MRN: [id number]   Date of Service:  2023    Admitting Diagnosis:  Acute on chronic systolic CHF (congestive heart failure) [facility])  Current Admission Summary: Michelle Jimenez is a 80 y.o. female with a pmh of myelodysplastic syndrome on treatment, chronic systolic CHF, hypertension, hyperlipidemia, hypothyroid, chronic A-fib, status post Watchman device, CKD 3 who presents with generalized weakness. Found to be in CHF exacerbation. Past Medical History:  has a past medical history of Aortic valve insufficiency, Arthritis, ASD (atrial septal defect), Atrial fibrillation ([address]), CAD (coronary artery disease), Cancer ([address]), Chronic gouty arthropathy, Chronic kidney disease, Congestive heart failure, CVA (cerebrovascular accident), Essential tremor, Gout, Hyperlipidemia, Hypertension, Hypothyroidism, Intervertebral disc disease, Ischemic cardiomyopathy, Mitral valve stenosis, Myelodysplastic syndrome ([address]), Peptic ulcer, and TIA (transient ischemic attack). Past Surgical History:  has a past surgical history that includes back surgery; Cholecystectomy; Cardiac surgery; Coronary artery bypass graft (); shoulder surgery; Cardiac catheterization (2012); hip surgery (Left, 2017); joint replacement; Cardiac catheterization (2018); Percutaneous Transluminal Coronary Angio (2014); pr njx aa&/strd tfrml epi lumbar/sacral 1 level (Right, 2018); Femoral-femoral Bypass Graft (N/A, 2019); femoral bypass (Left, 2019); IR KYPHOPLASTY LUMBAR 1 VERTEBRAL BODY (2021); pacemaker placement; and Insertable Cardiac Monitor. Discharge Recommendations: Michelle Jimenez scored a 16/24 on the AM-PAC ADL Inpatient form.  Current research shows

## 2023-11-09 NOTE — PROGRESS NOTES
Results   Component Value Date/Time    INR 1.27 11/12/2022 02:35 AM    INR 1.19 01/22/2022 02:16 PM    INR 1.01 05/14/2021 08:15 AM        CARDIAC LABS  ENZYMES:No results for input(s): \"CKMB\", \"CKMBINDEX\", \"TROPONINI\" in the last 72 hours. Invalid input(s): \"CKTOTAL;3\"  FASTING LIPID PANEL:  Lab Results   Component Value Date/Time    HDL 34 11/07/2023 05:01 AM    HDL 31 06/06/2012 08:49 AM    LDLDIRECT 54 07/27/2012 05:40 AM    LDLCALC 14 11/07/2023 05:01 AM    TRIG 66 11/07/2023 05:01 AM    TSH 0.08 11/07/2023 05:01 AM     LIVER PROFILE:  Lab Results   Component Value Date/Time    AST 12 11/06/2023 09:44 PM    AST 20 09/29/2023 08:26 PM    ALT 6 11/06/2023 09:44 PM    ALT 12 09/29/2023 08:26 PM     BNP:   Lab Results   Component Value Date/Time    PROBNP 15,745 11/06/2023 09:44 PM    PROBNP 5,673 06/01/2023 10:16 AM    PROBNP 6,017 04/30/2023 12:23 PM     Iron Studies:    Lab Results   Component Value Date/Time    TIBC 158 11/07/2023 05:01 AM    TIBC 251 02/14/2023 04:50 AM    TIBC 216 11/09/2020 11:05 AM    FERRITIN 619.1 11/08/2023 05:32 AM    FERRITIN 92.7 02/14/2023 04:50 AM     Lab Results   Component Value Date    IRON 21 (L) 11/07/2023    TIBC 158 (L) 11/07/2023    FERRITIN 619.1 (H) 11/08/2023          1. WEIGHT: Admit Weight - Scale: 42.5 kg (93 lb 12.8 oz) (pt unable to stand independently. One hand hold weighed twice. last weight per fam was 106 on 11/6 at [facility])      Today  Weight - Scale: 46.7 kg (102 lb 15.3 oz)   2.  I/O   Intake/Output Summary (Last 24 hours) at 11/9/2023 1001  Last data filed at 11/9/2023 0524  Gross per 24 hour   Intake 752.88 ml   Output 300 ml   Net 452.88 ml         Assessment/Plan:     Acute Heart Failure:  ~compensated   ~ BNP 15k>8116  ~Plan:stop IV lasix and restart po torsemide, continue ARB, Monitor I&O's, Daily weights, Pt education  Cardiomyopathy: improved EF  ~Echo:  EF: 55-60%   Core measures for HF:  ACEi/ARB/ARNI: losartan      BB: Carvedilol  Stopped May 2023, unsure why,  restart today   Td:  none for renal insuff      SGLT2i:     ~Device: BS PPM 2013  ~Plan: Echo shows stable EF, valvular disease  HTN:   ~controlled  CAD:   ~Status:   ~Meds:   Statin crestor  BB: Carvedilol stopped in may- restart low dose today  Plavix stopped for anemia  Atrial Fib   ~stable, s/p watchman  Anemia             ~continued   ~Heme consult                          All questions and concerns were addressed to the patient. Alternatives to my treatment were discussed. I have discussed the above stated plan with patient and the nurse. The patient verbalized understanding and agreed with the plan.     I appreciate the opportunity of cooperating in the care of this individual.    Crystal Ochoa, APRN - CNP, ACNP, AGPCNP 11/9/2023, 10:01 AM  Heart Failure  The [address]  [address] [facility] [address], [address], [address]  Ph: [phone]

## 2023-11-09 NOTE — PROGRESS NOTES
HOSPITALISTS PROGRESS NOTE    11/9/2023 9:33 AM        Name: Kirstin Miller . Admitted: 11/6/2023  Primary Care Provider: Danya Venegas MD (Tel: [phone])      Brief Course: This 80 y.o. female with PMHx of myelodysplastic syndrome on treatment, chronic systolic CHF, hypertension, hyperlipidemia, hypothyroid, chronic A-fib, status post Watchman device, CKD 3 who presents with generalized weakness. Found to be in CHF exacerbation. Interval history:   Pt seen and examined today. Overnight events noted, interval ancillary notes and labs reviewed. On RA satting well. Afebrile overnight, WBCs remained around 0.4K, cultures negative to date  Diuresed with IV Lasix; switched to p.o. torsemide today. Creatinine down to 1.4  Up in chair; denied any fever, chills, chest pain, SOB, bowel bladder dysfunction      Assessment & Plan:     Pancytopenia due to MDS; WBC of 0.4. Hemoglobin 7.5. S/p blood transfusion on 11/3. Follows with oncology;Started Vidaza injections on 10/16/2023, next due 11/13/2023. On Retacrit 40,000 units weekly for anemia, next due 11/9/2023  Iron studies c/w iron deficiency and AOCD. Vitamin B12 and folate normal  Monitor CBC closely. Transfuse PRBC if hgb <7  Continue neutropenic precautions. Neutropenic fever; resolved  Flu and COVID, MRSA nares negative. CXR -ve for pneumonia UA negative. Blood cxs in progress. Continue empiric cefepime    Concern for aspiration; MBS showed penetration/aspiration to level of cords  Speech on board; continue diet per their recs  Patient unable to take any p.o. then will we will consult GI for PEG tube    Acute on chronic CHF; appears compensated  CXR showed cardiomegaly with pulmonary vascular congestion and trace bilateral pleural effusions. proBNP >15K on admission; trended down to 8.1K.   Status post IV diuresis; switch to p.o. torsemide  Monitor electrolytes closely while on Lasix and replace as needed  Cardiology on board; appreciate their input. Cardiomyopathy with improved EF  Echo on 1/7/23 showed EF of 50 to 55%, grade 3 DD with elevated LV pressures, mild MS, moderate MR  Continue losartan, Coreg and torsemide. Salt and fluid restriction    Hx of CAD; stable. Continue statin. Plavix discontinued secondary to anemia    Chronically elevated troponin. Troponin 51 on admission; was 55 on 9/29. Denies chest pain. EKG without acute ischemia. Hx of chronic A-fib S/P Watchman device. Monitor on telemetry    Hx of CKD stage 3. Creatinine of 1.3. Baseline around 1.4. Avoid nephrotoxin, sitting up, elevation monitor renal function closely     Hypertension; continue current regimen and monitor BP closely     Hypothyroidism; continue Synthroid     Hx of essential tremor simple continue home dose of topiramate     Hx of of COPD-not in exacerbation. Continue as needed breathing treatments and home inhalers    Generalized weakness, multifactorial including CHF exacerbation, pancytopenia, MDS. PT/OT consulted.   Check vitamin D    DVT PPX: s/c heparin  Code:Full Code    Disposition: Once acute medical issues have resolved    Current Medications  cefepime (MAXIPIME) 1,000 mg in sodium chloride 0.9 % 50 mL IVPB (mini-bag), Q12H  epoetin hu-epbx (RETACRIT) injection 40,000 Units, Once  albuterol sulfate HFA (PROVENTIL;VENTOLIN;PROAIR) 108 (90 Base) MCG/ACT inhaler 2 puff, Q4H PRN  allopurinol (ZYLOPRIM) tablet 100 mg, Daily  HYDROcodone-acetaminophen (NORCO) 5-325 MG per tablet 1 tablet, 4x Daily PRN  levothyroxine (SYNTHROID) tablet 112 mcg, Daily  losartan (COZAAR) tablet 12.5 mg, Daily  rosuvastatin (CRESTOR) tablet 5 mg, Nightly  topiramate (TOPAMAX) tablet 100 mg, BID  sodium chloride flush 0.9 % injection 5-40 mL, 2 times per day  sodium chloride flush 0.9 % injection 5-40 mL, PRN  0.9 % sodium chloride infusion, PRN  ondansetron

## 2023-11-09 NOTE — PROGRESS NOTES
APRN notified by RN of brief episodes of Vtach overnight.  Self-limiting and asymptomatic  -EKG  -check electrolytes  -cont telemetry    Krystal Barillas, TIFFANIE - NP

## 2023-11-09 NOTE — CARE COORDINATION
Discharge Planning:    Discharge Planning Note Re: Skilled Nursing     CM/SW noted consult for discharge planning. Chart reviewed. Noted recommendations for SNF. CM met with patient and Sheryle Planas (dtr). Introduced self and explained role of CM and discharge planning. Patient is NOT agreeable to SNF on dc. Sheryle Planas reports patient active with [facility], staying with dtr. And has all needed dme at home . JUANI updated.      Daughter address for [facility] :  [address] [place] [address] [place] [id number]     Electronically signed by Andrew Lamb on 11/9/2023 at 3:36 PM

## 2023-11-09 NOTE — DISCHARGE INSTR - COC
Continuity of Care Form    Patient Name: Olga Holt   :    MRN:  [id number]    Admit date:  2023  Discharge date:  11/10/2023    Code Status Order: Full Code   Advance Directives:     Admitting Physician:  Karla Christian MD  PCP: Belia Engel MD    Discharging Nurse: Manoj Cruz RN  [facility] Unit/Room#: 8FJ-3487/5588-60  Discharging Unit Phone Number: [phone]    Emergency Contact:   Extended Emergency Contact Information  Primary Emergency Contact: Florentin Hudson  Address: [address], [facility] [place] [address] [address] Phone: [phone]  Mobile Phone: [phone]  Relation: Child  Secondary Emergency Contact: [address] Phone: [phone]  Relation: Child    Past Surgical History:  Past Surgical History:   Procedure Laterality Date    BACK SURGERY      CARDIAC CATHETERIZATION  2012    CARDIAC CATHETERIZATION  2018    Unsuccesful  of RCA    CARDIAC SURGERY      CABG & Cardiac ablation    CHOLECYSTECTOMY      CORONARY ARTERY BYPASS GRAFT  1987    LIMA- Diag/LAD, SVG- RCA    FEMORAL BYPASS Left 2019    LEFT FEMORAL TO POPLITEAL BYPASS GRAFT performed by Michael De Jesus MD at [address] GRAFT N/A 2019    FEMORAL TO FEMORAL BYPASS performed by Michael De Jesus MD at [address] Left 2017    Left hip pinning    INSERTABLE CARDIAC MONITOR      Watchman    IR KYPHOPLASTY LUMBAR FIRST LEVEL  2021    IR KYPHOPLASTY LUMBAR FIRST LEVEL 2021 [facility] SPECIAL PROCEDURES    JOINT REPLACEMENT      PACEMAKER PLACEMENT      OH NJX AA&/STRD TFRML EPI LUMBAR/SACRAL 1 LEVEL Right 2018    RIGHT L3 AND L4 LUMBAR TRANSFORAMINAL EPIRUAL STEROID INJECTION WITH FLUOROSCOPY performed by Geri Berg MD at [facility]  2014    MARLENY - 3.0 x 28 to the Ist [address]      left       Immunization History:   Immunization History   Administered Assessment Score: 25%  Readmission Risk              Risk of Unplanned Readmission:  26           Discharging to [facility]  [address] [facility] [place], [address]  Phone: [phone]  Fax: [phone]     Dialysis Facility (if applicable)   Name:  Address:  Dialysis Schedule:  Phone:  Fax:    / signature: Electronically signed by Buffy Maloney on 11/9/23 at 3:37 PM EST    PHYSICIAN SECTION    Prognosis: Fair    Condition at Discharge: Stable    Rehab Potential (if transferring to Rehab): Fair    Recommended Labs or Other Treatments After Discharge:    Physician Certification: I certify the above information and transfer of Olga Holt  is necessary for the continuing treatment of the diagnosis listed and that she requires Home Care for less 30 days.      Update Admission H&P: No change in H&P    PHYSICIAN SIGNATURE:  Electronically signed by Christy Wooten MD on 11/10/23 at 3:03 PM EST

## 2023-11-09 NOTE — PROGRESS NOTES
[address] [facility] Department   Phone: [phone]    Physical Therapy    [] Initial Evaluation            [x] Daily Treatment Note         [] Discharge Summary      Patient: Michelle Vicente   :    MRN: [id number]   Date of Service:  2023  Admitting Diagnosis: Acute on chronic systolic CHF (congestive heart failure) [facility])  Current Admission Summary: Michelle Vicente is a 80 y.o. female with a pmh of myelodysplastic syndrome on treatment, chronic systolic CHF, hypertension, hyperlipidemia, hypothyroid, chronic A-fib, status post Watchman device, CKD 3 who presents with generalized weakness. Found to be in CHF exacerbation. Past Medical History:  has a past medical history of Aortic valve insufficiency, Arthritis, ASD (atrial septal defect), Atrial fibrillation ([address]), CAD (coronary artery disease), Cancer ([address]), Chronic gouty arthropathy, Chronic kidney disease, Congestive heart failure, CVA (cerebrovascular accident), Essential tremor, Gout, Hyperlipidemia, Hypertension, Hypothyroidism, Intervertebral disc disease, Ischemic cardiomyopathy, Mitral valve stenosis, Myelodysplastic syndrome ([address]), Peptic ulcer, and TIA (transient ischemic attack). Past Surgical History:  has a past surgical history that includes back surgery; Cholecystectomy; Cardiac surgery; Coronary artery bypass graft (); shoulder surgery; Cardiac catheterization (2012); hip surgery (Left, 2017); joint replacement; Cardiac catheterization (2018); Percutaneous Transluminal Coronary Angio (2014); pr njx aa&/strd tfrml epi lumbar/sacral 1 level (Right, 2018); Femoral-femoral Bypass Graft (N/A, 2019); femoral bypass (Left, 2019); IR KYPHOPLASTY LUMBAR 1 VERTEBRAL BODY (2021); pacemaker placement; and Insertable Cardiac Monitor. Discharge Recommendations: Michelle Vicente scored a 15/24 on the AM-PAC short mobility form.  Current research shows that an

## 2023-11-09 NOTE — PLAN OF CARE
Problem: ABCDS Injury Assessment  Goal: Absence of physical injury  Outcome: Progressing     Problem: Skin/Tissue Integrity  Goal: Absence of new skin breakdown  Description: 1. Monitor for areas of redness and/or skin breakdown  2. Assess vascular access sites hourly  3. Every 4-6 hours minimum:  Change oxygen saturation probe site  4. Every 4-6 hours:  If on nasal continuous positive airway pressure, respiratory therapy assess nares and determine need for appliance change or resting period.   Outcome: Progressing     Problem: Chronic Conditions and Co-morbidities  Goal: Patient's chronic conditions and co-morbidity symptoms are monitored and maintained or improved  Outcome: Progressing  Flowsheets (Taken 11/9/2023 0745)  Care Plan - Patient's Chronic Conditions and Co-Morbidity Symptoms are Monitored and Maintained or Improved:   Monitor and assess patient's chronic conditions and comorbid symptoms for stability, deterioration, or improvement   Collaborate with multidisciplinary team to address chronic and comorbid conditions and prevent exacerbation or deterioration   Update acute care plan with appropriate goals if chronic or comorbid symptoms are exacerbated and prevent overall improvement and discharge     Problem: Discharge Planning  Goal: Discharge to home or other facility with appropriate resources  Outcome: Progressing  Flowsheets (Taken 11/9/2023 0745)  Discharge to home or other facility with appropriate resources:   Identify barriers to discharge with patient and caregiver   Arrange for needed discharge resources and transportation as appropriate   Identify discharge learning needs (meds, wound care, etc)   Refer to discharge planning if patient needs post-hospital services based on physician order or complex needs related to functional status, cognitive ability or social support system     Problem: Safety - Adult  Goal: Free from fall injury  Outcome: Progressing     Problem: Respiratory - Adult  Goal: Achieves optimal ventilation and oxygenation  Outcome: Progressing     Problem: Cardiovascular - Adult  Goal: Maintains optimal cardiac output and hemodynamic stability  Outcome: Progressing     Problem: Metabolic/Fluid and Electrolytes - Adult  Goal: Electrolytes maintained within normal limits  Outcome: Progressing  Flowsheets (Taken 11/9/2023 0745)  Electrolytes maintained within normal limits:   Monitor labs and assess patient for signs and symptoms of electrolyte imbalances   Administer electrolyte replacement as ordered   Monitor response to electrolyte replacements, including repeat lab results as appropriate   Fluid restriction as ordered   Instruct patient on fluid and nutrition restrictions as appropriate  Goal: Hemodynamic stability and optimal renal function maintained  Outcome: Progressing  Flowsheets (Taken 11/9/2023 0745)  Hemodynamic stability and optimal renal function maintained:   Monitor labs and assess for signs and symptoms of volume excess or deficit   Monitor intake, output and patient weight   Monitor urine specific gravity, serum osmolarity and serum sodium as indicated or ordered     Problem: Hematologic - Adult  Goal: Maintains hematologic stability  Outcome: Progressing  Flowsheets (Taken 11/9/2023 0745)  Maintains hematologic stability:   Assess for signs and symptoms of bleeding or hemorrhage   Monitor labs for bleeding or clotting disorders   Administer blood products/factors as ordered     Problem: Nutrition Deficit:  Goal: Optimize nutritional status  Outcome: Progressing

## 2023-11-10 VITALS
HEART RATE: 75 BPM | TEMPERATURE: 97.8 F | DIASTOLIC BLOOD PRESSURE: 57 MMHG | OXYGEN SATURATION: 99 % | WEIGHT: 103.84 LBS | HEIGHT: 63 IN | RESPIRATION RATE: 18 BRPM | BODY MASS INDEX: 18.4 KG/M2 | SYSTOLIC BLOOD PRESSURE: 110 MMHG

## 2023-11-10 LAB
ABO + RH BLD: NORMAL
ANION GAP SERPL CALCULATED.3IONS-SCNC: 13 MMOL/L (ref 3–16)
BLD GP AB SCN SERPL QL: NORMAL
BUN SERPL-MCNC: 47 MG/DL (ref 7–20)
CALCIUM SERPL-MCNC: 8.4 MG/DL (ref 8.3–10.6)
CHLORIDE SERPL-SCNC: 108 MMOL/L (ref 99–110)
CO2 SERPL-SCNC: 19 MMOL/L (ref 21–32)
CREAT SERPL-MCNC: 1.3 MG/DL (ref 0.6–1.2)
DEPRECATED RDW RBC AUTO: 24.9 % (ref 12.4–15.4)
GFR SERPLBLD CREATININE-BSD FMLA CKD-EPI: 41 ML/MIN/{1.73_M2}
GLUCOSE SERPL-MCNC: 103 MG/DL (ref 70–99)
HCT VFR BLD AUTO: 24 % (ref 36–48)
HGB BLD-MCNC: 7.4 G/DL (ref 12–16)
MAGNESIUM SERPL-MCNC: 2.2 MG/DL (ref 1.8–2.4)
MCH RBC QN AUTO: 29.5 PG (ref 26–34)
MCHC RBC AUTO-ENTMCNC: 30.6 G/DL (ref 31–36)
MCV RBC AUTO: 96.5 FL (ref 80–100)
NT-PROBNP SERPL-MCNC: 6660 PG/ML (ref 0–449)
PATH INTERP BLD-IMP: NO
PLATELET # BLD AUTO: 333 K/UL (ref 135–450)
PMV BLD AUTO: 10.4 FL (ref 5–10.5)
POTASSIUM SERPL-SCNC: 3.4 MMOL/L (ref 3.5–5.1)
RBC # BLD AUTO: 2.49 M/UL (ref 4–5.2)
SODIUM SERPL-SCNC: 140 MMOL/L (ref 136–145)
WBC # BLD AUTO: 0.5 K/UL (ref 4–11)

## 2023-11-10 PROCEDURE — 6370000000 HC RX 637 (ALT 250 FOR IP): Performed by: INTERNAL MEDICINE

## 2023-11-10 PROCEDURE — 86900 BLOOD TYPING SEROLOGIC ABO: CPT

## 2023-11-10 PROCEDURE — 6370000000 HC RX 637 (ALT 250 FOR IP): Performed by: NURSE PRACTITIONER

## 2023-11-10 PROCEDURE — 86922 COMPATIBILITY TEST ANTIGLOB: CPT

## 2023-11-10 PROCEDURE — 83880 ASSAY OF NATRIURETIC PEPTIDE: CPT

## 2023-11-10 PROCEDURE — 86901 BLOOD TYPING SEROLOGIC RH(D): CPT

## 2023-11-10 PROCEDURE — 6360000002 HC RX W HCPCS: Performed by: INTERNAL MEDICINE

## 2023-11-10 PROCEDURE — 92526 ORAL FUNCTION THERAPY: CPT

## 2023-11-10 PROCEDURE — 36415 COLL VENOUS BLD VENIPUNCTURE: CPT

## 2023-11-10 PROCEDURE — 99231 SBSQ HOSP IP/OBS SF/LOW 25: CPT | Performed by: NURSE PRACTITIONER

## 2023-11-10 PROCEDURE — 2580000003 HC RX 258: Performed by: INTERNAL MEDICINE

## 2023-11-10 PROCEDURE — 83735 ASSAY OF MAGNESIUM: CPT

## 2023-11-10 PROCEDURE — 80048 BASIC METABOLIC PNL TOTAL CA: CPT

## 2023-11-10 PROCEDURE — 86850 RBC ANTIBODY SCREEN: CPT

## 2023-11-10 PROCEDURE — 85025 COMPLETE CBC W/AUTO DIFF WBC: CPT

## 2023-11-10 PROCEDURE — 97129 THER IVNTJ 1ST 15 MIN: CPT

## 2023-11-10 PROCEDURE — 2580000003 HC RX 258: Performed by: NURSE PRACTITIONER

## 2023-11-10 RX ORDER — SODIUM CHLORIDE 9 MG/ML
INJECTION, SOLUTION INTRAVENOUS PRN
Status: DISCONTINUED | OUTPATIENT
Start: 2023-11-10 | End: 2023-11-10 | Stop reason: HOSPADM

## 2023-11-10 RX ORDER — CARVEDILOL 3.12 MG/1
3.12 TABLET ORAL 2 TIMES DAILY WITH MEALS
Qty: 60 TABLET | Refills: 1 | Status: SHIPPED | OUTPATIENT
Start: 2023-11-10

## 2023-11-10 RX ORDER — TORSEMIDE 10 MG/1
10 TABLET ORAL DAILY
Qty: 30 TABLET | Refills: 1 | Status: SHIPPED | OUTPATIENT
Start: 2023-11-11

## 2023-11-10 RX ORDER — TORSEMIDE 20 MG/1
10 TABLET ORAL DAILY
Status: DISCONTINUED | OUTPATIENT
Start: 2023-11-11 | End: 2023-11-10 | Stop reason: HOSPADM

## 2023-11-10 RX ORDER — POTASSIUM CHLORIDE 20 MEQ/1
40 TABLET, EXTENDED RELEASE ORAL ONCE
Status: COMPLETED | OUTPATIENT
Start: 2023-11-10 | End: 2023-11-10

## 2023-11-10 RX ADMIN — SODIUM CHLORIDE, PRESERVATIVE FREE 10 ML: 5 INJECTION INTRAVENOUS at 11:13

## 2023-11-10 RX ADMIN — ALLOPURINOL 100 MG: 100 TABLET ORAL at 11:10

## 2023-11-10 RX ADMIN — LOSARTAN POTASSIUM 12.5 MG: 25 TABLET, FILM COATED ORAL at 11:10

## 2023-11-10 RX ADMIN — LEVOTHYROXINE SODIUM 112 MCG: 0.11 TABLET ORAL at 11:10

## 2023-11-10 RX ADMIN — TOPIRAMATE 100 MG: 100 TABLET, FILM COATED ORAL at 11:10

## 2023-11-10 RX ADMIN — CARVEDILOL 3.12 MG: 3.12 TABLET, FILM COATED ORAL at 11:10

## 2023-11-10 RX ADMIN — CEFEPIME 1000 MG: 1 INJECTION, POWDER, FOR SOLUTION INTRAMUSCULAR; INTRAVENOUS at 11:19

## 2023-11-10 RX ADMIN — POTASSIUM CHLORIDE 40 MEQ: 1500 TABLET, EXTENDED RELEASE ORAL at 11:13

## 2023-11-10 ASSESSMENT — PAIN SCALES - GENERAL: PAINLEVEL_OUTOF10: 0

## 2023-11-10 NOTE — PROGRESS NOTES
bolus hold and small single sips to improve control which was effective in eliminated s/s of aspiration. Mastication of soft solids was adequate with placement of lower dentures, she effectively cleared the oral cavity and tolerated without overt s/s of aspiration. Provided education to pt and pt's daughter regarding dysphagia and aspiration risks. They are in agreement to return to thin liquids with consistent use of 3 second bolus hold and soft and bite sized solids. Recommend to follow up with SLP upon discharge to ensure diet tolerance and for ongoing dysphagia intervention. Dysphagia Goals:  Pt will functionally tolerate recommended diet level with use of recommended compensatory strategies with no s/s of aspiration/penetration (ongoing 11/10/2023)     Pt/family will demonstrate understanding of results Modified Barium Swallow Study, risk for aspiration, rationale for diet level and compensatory strategies. (GOAL MET 11/8/2023 )    Pt will demonstrate improved sensory motor function via targeted exercises and appropriate treatment modalities (ongoing 11/10/2023)    Pt will tolerate advance to least restrictive diet as evidenced by repeat instrumental swallow study (ongoing 11/10/2023)      Speech Language/Cognitive Treatment:  Impressions:   Pt oriented to basic temporal concepts (current time and month) and self. She adequately recalled her plans to discharge later this date. She required set up assistance (questions) for recall of more complex recommendations and deferred unknown responses to her daughter (therapy follow up, previous diet recommendations). Pt reviewed her previous level of function and limitations (uses her daughter for support in medication and finance management, no longer drives). Her speech was intelligible for structured conversation and with unknown/novel context.  Based on today's session recommend continued skilled speech intervention targeting cognitive-linguistic skills to

## 2023-11-10 NOTE — PLAN OF CARE
Problem: ABCDS Injury Assessment  Goal: Absence of physical injury  Outcome: Adequate for Discharge     Problem: Skin/Tissue Integrity  Goal: Absence of new skin breakdown  Description: 1. Monitor for areas of redness and/or skin breakdown  2. Assess vascular access sites hourly  3. Every 4-6 hours minimum:  Change oxygen saturation probe site  4. Every 4-6 hours:  If on nasal continuous positive airway pressure, respiratory therapy assess nares and determine need for appliance change or resting period.   Outcome: Adequate for Discharge     Problem: Chronic Conditions and Co-morbidities  Goal: Patient's chronic conditions and co-morbidity symptoms are monitored and maintained or improved  Outcome: Adequate for Discharge     Problem: Discharge Planning  Goal: Discharge to home or other facility with appropriate resources  Outcome: Adequate for Discharge     Problem: Safety - Adult  Goal: Free from fall injury  Outcome: Adequate for Discharge     Problem: Respiratory - Adult  Goal: Achieves optimal ventilation and oxygenation  Outcome: Adequate for Discharge     Problem: Cardiovascular - Adult  Goal: Maintains optimal cardiac output and hemodynamic stability  Outcome: Adequate for Discharge     Problem: Metabolic/Fluid and Electrolytes - Adult  Goal: Electrolytes maintained within normal limits  Outcome: Adequate for Discharge  Goal: Hemodynamic stability and optimal renal function maintained  Outcome: Adequate for Discharge     Problem: Hematologic - Adult  Goal: Maintains hematologic stability  Outcome: Adequate for Discharge     Problem: Nutrition Deficit:  Goal: Optimize nutritional status  Outcome: Adequate for Discharge  Flowsheets (Taken 11/10/2023 1026 by Chana Lira)  Nutrient intake appropriate for improving, restoring, or maintaining nutritional needs:   Monitor oral intake, labs, and treatment plans   Recommend appropriate diets, oral nutritional supplements, and vitamin/mineral supplements

## 2023-11-10 NOTE — PROGRESS NOTES
[facility]  HEART FAILURE  Progress Note      Admit Date 11/6/2023     Reason for Consult:      Reason for Consultation/Chief Complaint: fatigue    HPI:    Perez Goldman is a 80 y.o. female with PMH CAD, CABG, PCI, AF, s/p watchman 2021, PPM, HFpEF, MDS admitted with fatigue and weakness. She remains anemic, echo done that is stable. Subjective:  Patient is being seen for CHF. Today Ms. Kelly Spivey denies chest pain, shortness of breath, palpitations, or dizziness and is hoping to go home. She does not like the thickened water and I'm afraid may not take as much in so I will decrease her diuretic dose. Baseline Weight: 100-102  Wt Readings from Last 3 Encounters:   11/10/23 47.1 kg (103 lb 13.4 oz)   11/06/23 48.1 kg (106 lb 2 oz)   10/24/23 47.2 kg (104 lb)         Cardiac Testing: Echo 11/8/23:    Summary   The left ventricle is mildly dilated. Normal left ventricular wall   thickness. The basal and mid inferoseptum appear hyokinetic. Ejection   fraction is estimated at 50-55%. Grade III diastolic dysfunction with elevated LV filling pressures. The right ventricle is enlarged. Right ventricular systolic function is   normal .   Mild mitral valve stenosis. Moderate mitral regurgitation is present (multiple jets)   The left atrium is severely dilated. Mild tricuspid regurgitation. The right atrium is severely dilated. Moderate aortic regurgitation. Echo: 11/2022  Summary   Left ventricle is dilated with mild concentric left ventricular hypertrophy. Overall, left ventricular systolic function is mildly depressed. Ejection fraction is visually estimated to be 45-50%. No obvious regional wall motion abnormalities are noted. Grade III diastolic dysfunction with elevated LV filling pressures. E/e' =   31.0. There is at least moderate mitral regurgitation. Moderate tricuspid regurgitation.  Estimated pulmonary artery systolic   pressure is elevated at 44 mmHg assuming a right CARDIAC LABS  ENZYMES:No results for input(s): \"CKMB\", \"CKMBINDEX\", \"TROPONINI\" in the last 72 hours. Invalid input(s): \"CKTOTAL;3\"  FASTING LIPID PANEL:  Lab Results   Component Value Date/Time    HDL 34 11/07/2023 05:01 AM    HDL 31 06/06/2012 08:49 AM    LDLDIRECT 54 07/27/2012 05:40 AM    LDLCALC 14 11/07/2023 05:01 AM    TRIG 66 11/07/2023 05:01 AM    TSH 0.08 11/07/2023 05:01 AM     LIVER PROFILE:  Lab Results   Component Value Date/Time    AST 12 11/06/2023 09:44 PM    AST 20 09/29/2023 08:26 PM    ALT 6 11/06/2023 09:44 PM    ALT 12 09/29/2023 08:26 PM     BNP:   Lab Results   Component Value Date/Time    PROBNP 6,660 11/10/2023 04:52 AM    PROBNP 8,116 11/09/2023 05:27 AM    PROBNP 15,745 11/06/2023 09:44 PM     Iron Studies:    Lab Results   Component Value Date/Time    TIBC 158 11/07/2023 05:01 AM    TIBC 251 02/14/2023 04:50 AM    TIBC 216 11/09/2020 11:05 AM    FERRITIN 619.1 11/08/2023 05:32 AM    FERRITIN 92.7 02/14/2023 04:50 AM     Lab Results   Component Value Date    IRON 21 (L) 11/07/2023    TIBC 158 (L) 11/07/2023    FERRITIN 619.1 (H) 11/08/2023          1. WEIGHT: Admit Weight - Scale: 42.5 kg (93 lb 12.8 oz) (pt unable to stand independently. One hand hold weighed twice. last weight per fam was 106 on 11/6 at [facility])      Today  Weight - Scale: 47.1 kg (103 lb 13.4 oz)   2.  I/O No intake or output data in the 24 hours ending 11/10/23 0857        Assessment/Plan:     Acute Heart Failure:  ~compensated   ~ BNP 09M>0195>0806  ~Plan:continue po torsemide - decrease dose a little, continue ARB, pt ok for discharge  Cardiomyopathy: improved EF  ~Echo:  EF: 55-60%   Core measures for HF:  ACEi/ARB/ARNI: losartan      BB: Carvedilol  Stopped May 2023, unsure why,  restarted 11/9/23   Td:  none for renal insuff      SGLT2i: none for cost    ~Device: BS PPM 2013  ~Plan: Echo shows stable EF, valvular disease  HTN:   ~controlled  CAD:   ~Status:   ~Meds:   Statin crestor  BB:

## 2023-11-10 NOTE — PLAN OF CARE
Problem: ABCDS Injury Assessment  Goal: Absence of physical injury  11/10/2023 0122 by Brent Gage RN  Outcome: Progressing  Flowsheets (Taken 11/10/2023 0122)  Absence of Physical Injury: Implement safety measures based on patient assessment     Problem: Skin/Tissue Integrity  Goal: Absence of new skin breakdown  Description: 1. Monitor for areas of redness and/or skin breakdown  2. Assess vascular access sites hourly  3. Every 4-6 hours minimum:  Change oxygen saturation probe site  4. Every 4-6 hours:  If on nasal continuous positive airway pressure, respiratory therapy assess nares and determine need for appliance change or resting period. 11/10/2023 0122 by Brent Gage RN  Outcome: Progressing  Note: Pt kept clean, turned and repositioned. Pure wick in place.       Problem: Chronic Conditions and Co-morbidities  Goal: Patient's chronic conditions and co-morbidity symptoms are monitored and maintained or improved  11/10/2023 0122 by Brent Gage RN  Outcome: Progressing  Flowsheets (Taken 11/9/2023 2044)  Care Plan - Patient's Chronic Conditions and Co-Morbidity Symptoms are Monitored and Maintained or Improved:   Monitor and assess patient's chronic conditions and comorbid symptoms for stability, deterioration, or improvement   Collaborate with multidisciplinary team to address chronic and comorbid conditions and prevent exacerbation or deterioration   Update acute care plan with appropriate goals if chronic or comorbid symptoms are exacerbated and prevent overall improvement and discharge     Problem: Discharge Planning  Goal: Discharge to home or other facility with appropriate resources  11/10/2023 0122 by Brent Gage RN  Outcome: Progressing  Flowsheets  Taken 11/10/2023 0122  Discharge to home or other facility with appropriate resources:   Identify barriers to discharge with patient and caregiver   Identify discharge learning needs (meds, wound care, etc)   Refer to discharge RN  Outcome: Progressing  Flowsheets (Taken 11/9/2023 2044)  Electrolytes maintained within normal limits:   Monitor labs and assess patient for signs and symptoms of electrolyte imbalances   Administer electrolyte replacement as ordered     Problem: Metabolic/Fluid and Electrolytes - Adult  Goal: Hemodynamic stability and optimal renal function maintained  11/10/2023 0122 by Glenn Pillai RN  Outcome: Progressing  Flowsheets (Taken 11/9/2023 2044)  Hemodynamic stability and optimal renal function maintained: Monitor labs and assess for signs and symptoms of volume excess or deficit     Problem: Hematologic - Adult  Goal: Maintains hematologic stability  11/10/2023 0122 by Glenn Pillai RN  Outcome: Progressing  Flowsheets  Taken 11/10/2023 0122  Maintains hematologic stability: Assess for signs and symptoms of bleeding or hemorrhage  Taken 11/9/2023 2044  Maintains hematologic stability: Assess for signs and symptoms of bleeding or hemorrhage     Problem: Nutrition Deficit:  Goal: Optimize nutritional status  11/10/2023 0122 by Glenn Pillai RN  Outcome: Progressing  Flowsheets (Taken 11/10/2023 0122)  Nutrient intake appropriate for improving, restoring, or maintaining nutritional needs:   Monitor oral intake, labs, and treatment plans   Recommend appropriate diets, oral nutritional supplements, and vitamin/mineral supplements

## 2023-11-10 NOTE — CARE COORDINATION
Home Care Information:   Is patient resuming current home health care services: No    [address]:   [address] [place] [address] José Antonio Alvarez, [address]  Phone: [phone]  Fax: [phone]          Services: PT/OT LAQUITA Berkowitz Order Obtained: Jessica Ramírez at [facility] [phone]  Home health agency notified of discharge.

## 2023-11-10 NOTE — DISCHARGE SUMMARY
Hospital Medicine Discharge Summary    Patient ID: Cristian Allen      Patient's PCP: Trevor Alvarado MD    Admit Date: 11/6/2023     Discharge Date: 11/10/2023    Admitting Physician: Karri Ovalle MD     Discharge Physician: Stephanie Pizano MD     Hospital Course: This 80 y.o. female with PMHx of myelodysplastic syndrome on treatment, chronic systolic CHF, hypertension, hyperlipidemia, hypothyroid, chronic A-fib, status post Watchman device, CKD 3 who presented with generalized weakness      Pancytopenia due to MDS;   WBC of 0.5. Hemoglobin 7.5. S/p blood transfusion on 11/3. Follows with oncology;started Vidaza injections on 10/16/2023, next due 11/13/2023. On Retacrit 40,000 units weekly for anemia, next due 11/9/2023  Iron studies c/w iron deficiency and AOCD. Vitamin B12 and folate normal    Low-grade fever; resolved  Flu and COVID, MRSA nares negative. CXR -ve for pneumonia. UA negative. Blood cxs NGTD  Empiric antibiotics continue     Aspiration; MBS showed penetration/aspiration to level of cords  Patient was started on diet per speech recs. Maintain aspiration precautions     Acute on chronic CHF; appears compensated  CXR showed cardiomegaly with pulmonary vascular congestion and trace bilateral pleural effusions. proBNP >15K on admission; trended down with IV diuresis  Cardiology consulted and patient was initially diuresed with IV Lasix and later switched to p.o. torsemide    Cardiomyopathy with improved EF  Echo on 1/7/23 showed EF of 50 to 55%, grade 3 DD with elevated LV pressures, mild MS, moderate MR  Continue losartan, Coreg and torsemide. Salt and fluid restriction     Hx of CAD; stable. Continue statin. Plavix discontinued secondary to anemia     Chronically elevated troponin. Troponin 51 on admission; was 55 on 9/29. Denies chest pain. EKG without acute ischemia. Hx of chronic A-fib S/P Watchman device. Hx of CKD stage 3.   Creatinine at baseline accuracy of this automated transcription, some errors in transcription may have occurred inadvertently. If you may need any clarification, please do not hesitate to contact me through [facility].

## 2023-11-10 NOTE — CONSENT
Informed Consent for Blood Component Transfusion Note    I have discussed with the patient the rationale for blood component transfusion; its benefits in treating or preventing fatigue, organ damage, or death; and its risk which includes mild transfusion reactions, rare risk of blood borne infection, or more serious but rare reactions. I have discussed the alternatives to transfusion, including the risk and consequences of not receiving transfusion. The patient had an opportunity to ask questions and had agreed to proceed with transfusion of blood components.     Electronically signed by Wellington Phillip MD on 11/10/23 at 12:00 PM EST

## 2023-11-10 NOTE — PROGRESS NOTES
Nutrition Note    RECOMMENDATIONS  PO Diet: Dysphagia Pureed with Mildly Thick Liquids   ONS: Will discontinue Magic Cup per pt request   Bowel Regimen: Consider changing PRN glycolax to scheduled     NUTRITION ASSESSMENT   Pt triggered for follow-up. Upon visiting, pt reports okay appetite but eating less than half of meals d/t dislike of hospital food. Upon initial assessment, supplement was changed to [employer] per RD d/t pt disliking Ensure. Pt now states she dislikes Magic Cup and unwilling to try other supplement options. Will discontinue Magic Cup and continue to monitor PO intake of meals vs need for nutrition support. Nutrition Related Findings: K+ 3.4; receiving replacement. Glucose, random . No BM documented; active bowel sounds. No edema noted. Wounds: None  Nutrition Education:  Education not indicated   Nutrition Goals: PO intake 50% or greater, by next RD assessment     MALNUTRITION ASSESSMENT   Chronic Illness  Malnutrition Status: Severe malnutrition  Findings of the 6 clinical characteristics of malnutrition:  Energy Intake:  Mild decrease in energy intake (Comment) (pt reports dislike of hospital food)  Weight Loss:  Mild weight loss (specify amount and time period) (5.4% loss in 6 months)     Body Fat Loss:  Severe body fat loss Orbital, Buccal region   Muscle Mass Loss:  Severe muscle mass loss Clavicles (pectoralis & deltoids)  Fluid Accumulation:  No significant fluid accumulation     Strength:  Not Performed      NUTRITION DIAGNOSIS   Severe malnutrition related to inadequate protein-energy intake as evidenced by Criteria as identified in malnutrition assessment, intake 0-25%, intake 26-50%      CURRENT NUTRITION THERAPIES  ADULT ORAL NUTRITION SUPPLEMENT; Lunch, Dinner; Frozen Oral Supplement  ADULT DIET;  Dysphagia - Pureed; Mildly Thick ([place])     PO Intake: 1-25%, 26-50%   PO Supplement Intake:Refusing to take      ANTHROPOMETRICS  Current Height: 160 cm (5' 3\")  Current

## 2023-11-10 NOTE — CARE COORDINATION
11/10/23 1518   IMM Letter   IMM Letter given to Patient/Family/Significant other/Guardian/POA/by: Cm provided Pt. and Dtr. IMM, Dtr. signed   IMM Letter date given: 11/10/23   IMM Letter time given: 1519     Pt is aware of discharge, and agreeable .   Electronically signed by Quinton Monroy on 11/10/2023 at 3:19 PM

## 2023-11-10 NOTE — CONSULTS
Palliative Care:     80 y.o. female brought to ED with exacerbation of myelodysplastic syndrome, generalized weakness and CHF exacerbation. Patient being followed with Oncology. Last treatment on 11/2/23. Cardiac Testing: Echo 11/8/23:    Summary   The left ventricle is mildly dilated. Normal left ventricular wall   thickness. The basal and mid inferoseptum appear hyokinetic. Ejection   fraction is estimated at 50-55%. Grade III diastolic dysfunction with elevated LV filling pressures. The right ventricle is enlarged. Right ventricular systolic function is   normal .   Mild mitral valve stenosis. Moderate mitral regurgitation is present (multiple jets)   The left atrium is severely dilated. Mild tricuspid regurgitation. The right atrium is severely dilated. Moderate aortic regurgitation. Past Medical History:   has a past medical history of Aortic valve insufficiency, Arthritis, ASD (atrial septal defect), Atrial fibrillation ([address]), CAD (coronary artery disease), Cancer ([address]), Chronic gouty arthropathy, Chronic kidney disease, Congestive heart failure, CVA (cerebrovascular accident), Essential tremor, Gout, Hyperlipidemia, Hypertension, Hypothyroidism, Intervertebral disc disease, Ischemic cardiomyopathy, Mitral valve stenosis, Myelodysplastic syndrome ([address]), Peptic ulcer, and TIA (transient ischemic attack). Past Surgical History:   has a past surgical history that includes back surgery; Cholecystectomy; Cardiac surgery; Coronary artery bypass graft (1987); shoulder surgery; Cardiac catheterization (07/2012); hip surgery (Left, 03/16/2017); joint replacement; Cardiac catheterization (08/07/2018); Percutaneous Transluminal Coronary Angio (11/04/2014); pr njx aa&/strd tfrml epi lumbar/sacral 1 level (Right, 12/03/2018); Femoral-femoral Bypass Graft (N/A, 08/22/2019); femoral bypass (Left, 08/22/2019);  IR KYPHOPLASTY LUMBAR 1 VERTEBRAL BODY (05/14/2021); pacemaker placement; and Insertable natural death, in which case hospice may be helpful. Patient wishes are to remain full code with all aggressive treatment interventions. Discharge goal is to return to home setting at daughter's home with HHC/Therapy. Will continue to follow as needed.      Electronically signed by Allie Adams RN , BSN, [facility] (Palliative Care) on 11/10/23 at 11:09 AM EST

## 2023-11-11 PROBLEM — D46.9 MYELODYSPLASTIC SYNDROME (HCC): Status: ACTIVE | Noted: 2023-11-11

## 2023-11-11 LAB
BACTERIA BLD CULT ORG #2: NORMAL
BACTERIA BLD CULT: NORMAL
BLOOD BANK DISPENSE STATUS: NORMAL
BLOOD BANK PRODUCT CODE: NORMAL
BPU ID: NORMAL
DESCRIPTION BLOOD BANK: NORMAL

## 2023-11-13 ENCOUNTER — CLINICAL DOCUMENTATION ONLY (OUTPATIENT)
Facility: CLINIC | Age: 83
End: 2023-11-13

## 2023-11-14 ENCOUNTER — OFFICE VISIT (OUTPATIENT)
Dept: FAMILY MEDICINE CLINIC | Age: 83
End: 2023-11-14

## 2023-11-14 VITALS
WEIGHT: 102.5 LBS | BODY MASS INDEX: 18.16 KG/M2 | RESPIRATION RATE: 12 BRPM | DIASTOLIC BLOOD PRESSURE: 60 MMHG | TEMPERATURE: 97.3 F | SYSTOLIC BLOOD PRESSURE: 122 MMHG | HEART RATE: 64 BPM

## 2023-11-14 DIAGNOSIS — R06.02 SOB (SHORTNESS OF BREATH): ICD-10-CM

## 2023-11-14 DIAGNOSIS — D46.9 MYELODYSPLASTIC SYNDROME (HCC): ICD-10-CM

## 2023-11-14 DIAGNOSIS — I50.23 ACUTE ON CHRONIC SYSTOLIC CHF (CONGESTIVE HEART FAILURE) (HCC): Primary | ICD-10-CM

## 2023-11-14 DIAGNOSIS — N18.32 CHRONIC RENAL FAILURE, STAGE 3B (HCC): ICD-10-CM

## 2023-11-14 DIAGNOSIS — R41.3 MEMORY LOSS DUE TO MEDICAL CONDITION: ICD-10-CM

## 2023-11-14 NOTE — PROGRESS NOTES
medical condition    Symptomatic bradycardia    Pacemaker lead failure    Presence of Watchman left atrial appendage closure device    Chronic right sacroiliac pain    Postcholecystectomy diarrhea    Chronic renal failure, stage 3b ([address])    Coronary artery disease involving native heart without angina pectoris, unspecified vessel or lesion type    Dysphonia    Severe malnutrition ([address])    Former smoker    SOB (shortness of breath)    Pancytopenia (HCC)    Thrombocytopenia, unspecified (HCC)    Acute on chronic systolic CHF (congestive heart failure) ([address])    Myelodysplastic syndrome ([address])       Outpatient Medications Marked as Taking for the 11/14/23 encounter (Office Visit) with Heidi Gallardo MD   Medication Sig Dispense Refill    carvedilol (COREG) 3.125 MG tablet Take 1 tablet by mouth 2 times daily (with meals) 60 tablet 1    torsemide (DEMADEX) 10 MG tablet Take 1 tablet by mouth daily 30 tablet 1    fexofenadine (ALLEGRA ALLERGY) 60 MG tablet Take 1 tablet by mouth daily      levothyroxine (SYNTHROID) 100 MCG tablet Take 1 tablet by mouth daily 90 tablet 1    allopurinol (ZYLOPRIM) 100 MG tablet Take 1 tablet by mouth daily 90 tablet 1    vitamin D (ERGOCALCIFEROL) 1.25 MG (79111 UT) CAPS capsule TAKE 1 CAPSULE ONE TIME WEEKLY 12 capsule 1    topiramate (TOPAMAX) 50 MG tablet TAKE 2 TABLETS TWICE DAILY 360 tablet 1    HYDROcodone-acetaminophen (NORCO) 5-325 MG per tablet Take 1 tablet by mouth 4 times daily as needed for Pain for up to 30 days. 120 tablet 0    losartan (COZAAR) 25 MG tablet Take 0.5 tablets by mouth daily 30 tablet 5    rosuvastatin (CRESTOR) 5 MG tablet TAKE 1 TABLET EVERY NIGHT 90 tablet 1    albuterol sulfate HFA (PROVENTIL HFA) 108 (90 Base) MCG/ACT inhaler Inhale 2 puffs into the lungs every 4 hours as needed for Wheezing or Shortness of Breath (Space out to every 6 hours as symptoms improve) Space out to every 6 hours as symptoms improve.  18 g 0       Allergies   Allergen Reactions

## 2023-11-15 PROBLEM — I47.19 PAT (PAROXYSMAL ATRIAL TACHYCARDIA): Status: RESOLVED | Noted: 2017-08-08 | Resolved: 2023-11-15

## 2023-11-15 PROBLEM — D69.6 THROMBOCYTOPENIA, UNSPECIFIED (HCC): Status: RESOLVED | Noted: 2023-11-06 | Resolved: 2023-11-15

## 2023-11-15 NOTE — PROGRESS NOTES
Physician Progress Note      PATIENT:               Jose Varela  CSN #:                  [id number]  :                       1940  ADMIT DATE:       2023 8:30 PM  [address] [place] [address] DATE:        11/10/2023 5:00 PM  RESPONDING  PROVIDER #:        Rex Ganser MD          QUERY TEXT:    Pt admitted with weakness and fatigue and has CHF documented. If possible,   please document in progress notes and discharge summary further specificity   regarding the type and acuity of CHF:    The medical record reflects the following:  Risk Factors: Age, Hx- Chronic systolic CHF, HTN, Chronic AF, CKD 3, MDS  Clinical Indicators: P 88,  RR 27... .... BNP 15,745. ... .. ECHO 23- EF   50-55%. ...... Clemmie Numbers CXR- Cardiomegaly with pulmonary vascular congestion and trace   bilateral pleural effusions. ....... Clemmie Numbers Per Cardiology consult on 23- Acute   on chronic combined S/D heart failure,    +JVD. ...... Clemmie Numbers Per DS on 11/10/23-    Acute on chronic CHF; appears compensated  Treatment: Lasix IV, Strict I&O, Repeat ECHO, Cardiology consult, Torsemide   oral    Thank You,  Yakelin Sharif RN BSN CDS CRCR  [email or web address]  Options provided:  -- Acute on Chronic Systolic and Diastolic CHF  -- Chronic Systolic and Diastolic CHF  -- Other - I will add my own diagnosis  -- Disagree - Not applicable / Not valid  -- Disagree - Clinically unable to determine / Unknown  -- Refer to Clinical Documentation Reviewer    PROVIDER RESPONSE TEXT:    This patient is in acute on chronic systolic and diastolic CHF.     Query created by: Yakelin Sharif on 11/15/2023 7:42 AM      Electronically signed by:  Rex Ganser MD 11/15/2023 8:11 AM

## 2023-11-16 ENCOUNTER — TELEPHONE (OUTPATIENT)
Dept: CARDIOLOGY CLINIC | Age: 83
End: 2023-11-16

## 2023-11-16 NOTE — TELEPHONE ENCOUNTER
Pt's daughter is calling because pt's be is 134/47 today and she is concerned because while pt was in the hospital Dr Caryl Elmore started her back on carvedilol after Dr iH Mccoy had changed her to losartan and took her off the carvedilol. Pt requesting NPRG advise the correct course of action.

## 2023-11-16 NOTE — TELEPHONE ENCOUNTER
I put her back on low dose carvedilol while inpt and she was tolerating it fine. BP at 134/47 is fine so I would continue. She needs both for her heart.  Claudia Marquez

## 2023-11-27 DIAGNOSIS — M15.9 PRIMARY OSTEOARTHRITIS INVOLVING MULTIPLE JOINTS: ICD-10-CM

## 2023-11-27 RX ORDER — CARVEDILOL 3.12 MG/1
3.12 TABLET ORAL 2 TIMES DAILY WITH MEALS
Qty: 60 TABLET | Refills: 2 | Status: SHIPPED | OUTPATIENT
Start: 2023-11-27

## 2023-11-27 RX ORDER — HYDROCODONE BITARTRATE AND ACETAMINOPHEN 5; 325 MG/1; MG/1
1 TABLET ORAL 4 TIMES DAILY PRN
Qty: 120 TABLET | Refills: 0 | Status: SHIPPED | OUTPATIENT
Start: 2023-11-27 | End: 2023-12-27

## 2023-11-27 NOTE — TELEPHONE ENCOUNTER
Medication:   Requested Prescriptions     Pending Prescriptions Disp Refills    HYDROcodone-acetaminophen (NORCO) 5-325 MG per tablet 120 tablet 0     Sig: Take 1 tablet by mouth 4 times daily as needed for Pain for up to 30 days. carvedilol (COREG) 3.125 MG tablet 60 tablet 2     Sig: Take 1 tablet by mouth 2 times daily (with meals)      Last Filled:  10/30/23    Patient Phone Number: There are no phone numbers on file. Last appt: 11/14/2023   Next appt: 2/6/2024    Last OARRS:       6/1/2021     4:12 PM   RX Monitoring   Periodic Controlled Substance Monitoring Possible medication side effects, risk of tolerance/dependence & alternative treatments discussed.      PDMP Monitoring:    Last PDMP Hoda Gates as Reviewed [facility]):  Review User Review Instant Review Result   Rain Terrell 8/4/2023  1:05 PM Reviewed PDMP [1]     Preferred Pharmacy:     [address] [place] [address] [id number] - [id number], [address] [place] [address],[address] [place] [address] [phone] Shruthi Orona [phone]  [address] [place] [address]  Phone: [phone] Fax: [phone]

## 2023-11-27 NOTE — TELEPHONE ENCOUNTER
HYDROcodone-acetaminophen (NORCO) 5-325 MG per tablet [[id number]]     carvedilol (COREG) 3.125 MG tablet [[id number]] (prescription prescribed when patient was in the hospital)    [facility] [id number] [facility], [address] [place] [address],[address] [address] [phone] Juventino Swanson [phone]  [facility], [facility] [facility]  Phone: [phone]  Fax: [phone]

## 2023-12-08 ENCOUNTER — NURSE ONLY (OUTPATIENT)
Dept: CARDIOLOGY CLINIC | Age: 83
End: 2023-12-08

## 2023-12-08 ENCOUNTER — OFFICE VISIT (OUTPATIENT)
Dept: CARDIOLOGY CLINIC | Age: 83
End: 2023-12-08

## 2023-12-08 VITALS
DIASTOLIC BLOOD PRESSURE: 52 MMHG | SYSTOLIC BLOOD PRESSURE: 112 MMHG | WEIGHT: 105.4 LBS | HEART RATE: 71 BPM | OXYGEN SATURATION: 100 % | HEIGHT: 63 IN | BODY MASS INDEX: 18.68 KG/M2

## 2023-12-08 DIAGNOSIS — I48.0 PAROXYSMAL ATRIAL FIBRILLATION (HCC): ICD-10-CM

## 2023-12-08 DIAGNOSIS — I47.19 PAT (PAROXYSMAL ATRIAL TACHYCARDIA): Primary | ICD-10-CM

## 2023-12-08 DIAGNOSIS — I10 ESSENTIAL HYPERTENSION, BENIGN: ICD-10-CM

## 2023-12-08 DIAGNOSIS — I25.5 ISCHEMIC CARDIOMYOPATHY: ICD-10-CM

## 2023-12-08 DIAGNOSIS — I48.20 CHRONIC ATRIAL FIBRILLATION (HCC): ICD-10-CM

## 2023-12-08 DIAGNOSIS — Z95.0 PACEMAKER: ICD-10-CM

## 2023-12-08 DIAGNOSIS — R00.1 SYMPTOMATIC BRADYCARDIA: ICD-10-CM

## 2023-12-08 DIAGNOSIS — Z95.0 PACEMAKER: Primary | ICD-10-CM

## 2023-12-08 DIAGNOSIS — I25.10 CORONARY ARTERY DISEASE INVOLVING NATIVE HEART WITHOUT ANGINA PECTORIS, UNSPECIFIED VESSEL OR LESION TYPE: ICD-10-CM

## 2023-12-08 DIAGNOSIS — I49.5 SICK SINUS SYNDROME (HCC): ICD-10-CM

## 2023-12-11 ENCOUNTER — HOSPITAL ENCOUNTER (OUTPATIENT)
Age: 83
Discharge: HOME | End: 2023-12-11
Payer: MEDICARE

## 2023-12-11 DIAGNOSIS — N18.32 STAGE 3B CHRONIC KIDNEY DISEASE (HCC): ICD-10-CM

## 2023-12-11 DIAGNOSIS — I50.22 CHRONIC SYSTOLIC CONGESTIVE HEART FAILURE (HCC): ICD-10-CM

## 2023-12-11 DIAGNOSIS — R80.1 PERSISTENT PROTEINURIA: ICD-10-CM

## 2023-12-11 DIAGNOSIS — N18.30 BENIGN HYPERTENSION WITH CHRONIC KIDNEY DISEASE, STAGE III (HCC): ICD-10-CM

## 2023-12-11 DIAGNOSIS — I12.9 BENIGN HYPERTENSION WITH CHRONIC KIDNEY DISEASE, STAGE III (HCC): ICD-10-CM

## 2023-12-11 LAB
ALBUMIN SERPL-MCNC: 3.5 G/DL (ref 3.4–5)
ANION GAP SERPL CALCULATED.3IONS-SCNC: 13 MMOL/L (ref 3–16)
BACTERIA URNS QL MICRO: ABNORMAL /HPF
BILIRUB UR QL STRIP.AUTO: ABNORMAL
BUN SERPL-MCNC: 28 MG/DL (ref 7–20)
CALCIUM SERPL-MCNC: 8.5 MG/DL (ref 8.3–10.6)
CHLORIDE SERPL-SCNC: 106 MMOL/L (ref 99–110)
CLARITY UR: ABNORMAL
CO2 SERPL-SCNC: 22 MMOL/L (ref 21–32)
COLOR UR: ABNORMAL
CREAT SERPL-MCNC: 1.4 MG/DL (ref 0.6–1.2)
CREAT UR-MCNC: 190.8 MG/DL (ref 28–259)
DEPRECATED RDW RBC AUTO: 25.8 % (ref 12.4–15.4)
EPI CELLS #/AREA URNS HPF: ABNORMAL /HPF (ref 0–5)
GFR SERPLBLD CREATININE-BSD FMLA CKD-EPI: 37 ML/MIN/{1.73_M2}
GLUCOSE SERPL-MCNC: 94 MG/DL (ref 70–99)
GLUCOSE UR STRIP.AUTO-MCNC: NEGATIVE MG/DL
HCT VFR BLD AUTO: 40.8 % (ref 36–48)
HGB BLD-MCNC: 12.4 G/DL (ref 12–16)
HGB UR QL STRIP.AUTO: NEGATIVE
KETONES UR STRIP.AUTO-MCNC: ABNORMAL MG/DL
LEUKOCYTE ESTERASE UR QL STRIP.AUTO: ABNORMAL
MCH RBC QN AUTO: 29 PG (ref 26–34)
MCHC RBC AUTO-ENTMCNC: 30.5 G/DL (ref 31–36)
MCV RBC AUTO: 95.3 FL (ref 80–100)
NITRITE UR QL STRIP.AUTO: NEGATIVE
PATH INTERP BLD-IMP: NO
PH UR STRIP.AUTO: 5 [PH] (ref 5–8)
PHOSPHATE SERPL-MCNC: 3.7 MG/DL (ref 2.5–4.9)
PLATELET # BLD AUTO: 405 K/UL (ref 135–450)
PLATELET BLD QL SMEAR: ADEQUATE
PMV BLD AUTO: 8.9 FL (ref 5–10.5)
POTASSIUM SERPL-SCNC: 4.1 MMOL/L (ref 3.5–5.1)
PROT UR STRIP.AUTO-MCNC: ABNORMAL MG/DL
PROT UR-MCNC: 34 MG/DL
PROT/CREAT UR-RTO: 0.2 MG/DL
RBC # BLD AUTO: 4.28 M/UL (ref 4–5.2)
RBC #/AREA URNS HPF: ABNORMAL /HPF (ref 0–4)
SLIDE REVIEW: ABNORMAL
SODIUM SERPL-SCNC: 141 MMOL/L (ref 136–145)
SP GR UR STRIP.AUTO: 1.02 (ref 1–1.03)
UA DIPSTICK W REFLEX MICRO PNL UR: YES
URN SPEC COLLECT METH UR: ABNORMAL
UROBILINOGEN UR STRIP-ACNC: 1 E.U./DL
WBC # BLD AUTO: 1.5 K/UL (ref 4–11)
WBC #/AREA URNS HPF: ABNORMAL /HPF (ref 0–5)

## 2023-12-11 PROCEDURE — 81001 URINALYSIS AUTO W/SCOPE: CPT

## 2023-12-11 PROCEDURE — 84156 ASSAY OF PROTEIN URINE: CPT

## 2023-12-11 PROCEDURE — 85027 COMPLETE CBC AUTOMATED: CPT

## 2023-12-11 PROCEDURE — 36415 COLL VENOUS BLD VENIPUNCTURE: CPT

## 2023-12-11 PROCEDURE — 82570 ASSAY OF URINE CREATININE: CPT

## 2023-12-11 PROCEDURE — 80069 RENAL FUNCTION PANEL: CPT

## 2023-12-14 ENCOUNTER — TELEPHONE (OUTPATIENT)
Dept: FAMILY MEDICINE CLINIC | Age: 83
End: 2023-12-14

## 2023-12-14 NOTE — TELEPHONE ENCOUNTER
FYI    Patient was discharged today from home care physical therapy, stated she could not handle it due to the chemo. She met 2/6 goals, did improve but not by much. Home care nurse told patient if she wanted physical therapy again after chemo they would be more than happy to go out again.

## 2023-12-27 DIAGNOSIS — M15.9 PRIMARY OSTEOARTHRITIS INVOLVING MULTIPLE JOINTS: ICD-10-CM

## 2023-12-27 RX ORDER — HYDROCODONE BITARTRATE AND ACETAMINOPHEN 5; 325 MG/1; MG/1
1 TABLET ORAL 4 TIMES DAILY PRN
Qty: 120 TABLET | Refills: 0 | Status: SHIPPED | OUTPATIENT
Start: 2023-12-27 | End: 2024-01-26

## 2023-12-27 NOTE — TELEPHONE ENCOUNTER
HYDROcodone-acetaminophen (Marsh Schilder) 5-325 MG per tablet     [facility] [id number] David Mckeon, [address] [facility] [phone] Ismael Miller [phone]  [place] [address]  Phone: [phone]  Fax: [phone]

## 2023-12-27 NOTE — TELEPHONE ENCOUNTER
Medication:   Requested Prescriptions     Pending Prescriptions Disp Refills    HYDROcodone-acetaminophen (NORCO) 5-325 MG per tablet 120 tablet 0     Sig: Take 1 tablet by mouth 4 times daily as needed for Pain for up to 30 days. Last Filled:  11/27/2023    Patient Phone Number: There are no phone numbers on file. Last appt: 11/14/2023   Next appt: 2/6/2024    Last OARRS:       6/1/2021     4:12 PM   RX Monitoring   Periodic Controlled Substance Monitoring Possible medication side effects, risk of tolerance/dependence & alternative treatments discussed.      PDMP Monitoring:    Last PDMP Aung Kohler as Reviewed [facility]):  Review User Review Instant Review Result   Jordyn Rubalcava 8/4/2023  1:05 PM Reviewed PDMP [1]     Preferred Pharmacy:   [facility] [id number] - [id number], [address] [facility] [phone] [facility] [phone]  [address]  [facility] [address]  Phone: [phone] Fax: [phone]    [facility] Mail [facility] - [facility] [phone] - F [phone] [address] [facility] [address]  Phone: [phone] Fax: [phone]

## 2023-12-28 ENCOUNTER — TELEPHONE (OUTPATIENT)
Dept: FAMILY MEDICINE CLINIC | Age: 83
End: 2023-12-28

## 2023-12-28 DIAGNOSIS — J44.9 CHRONIC OBSTRUCTIVE PULMONARY DISEASE, UNSPECIFIED COPD TYPE (HCC): ICD-10-CM

## 2023-12-28 DIAGNOSIS — I25.10 CORONARY ARTERY DISEASE INVOLVING NATIVE HEART WITHOUT ANGINA PECTORIS, UNSPECIFIED VESSEL OR LESION TYPE: ICD-10-CM

## 2023-12-28 DIAGNOSIS — I70.213 ATHEROSCLEROSIS OF NATIVE ARTERIES OF EXTREMITIES WITH INTERMITTENT CLAUDICATION, BILATERAL LEGS (HCC): ICD-10-CM

## 2023-12-28 DIAGNOSIS — M81.0 AGE-RELATED OSTEOPOROSIS WITHOUT CURRENT PATHOLOGICAL FRACTURE: ICD-10-CM

## 2023-12-28 DIAGNOSIS — I63.9 CEREBROVASCULAR ACCIDENT (CVA), UNSPECIFIED MECHANISM (HCC): ICD-10-CM

## 2023-12-28 DIAGNOSIS — I50.23 ACUTE ON CHRONIC SYSTOLIC CHF (CONGESTIVE HEART FAILURE) (HCC): ICD-10-CM

## 2023-12-28 DIAGNOSIS — N18.32 CHRONIC RENAL FAILURE, STAGE 3B (HCC): Primary | ICD-10-CM

## 2023-12-28 DIAGNOSIS — R41.3 MEMORY LOSS DUE TO MEDICAL CONDITION: ICD-10-CM

## 2023-12-28 NOTE — TELEPHONE ENCOUNTER
[facility] from [facility] called and states that patient would like to resume home PT services from [address] [place]. She would like for provider to put in new orders for it.     Please advise

## 2023-12-29 ENCOUNTER — TELEPHONE (OUTPATIENT)
Dept: FAMILY MEDICINE CLINIC | Age: 83
End: 2023-12-29

## 2023-12-29 NOTE — TELEPHONE ENCOUNTER
HYDROcodone-acetaminophen (Vanessa Economy) 5-325 MG per tablet     [facility] [id number] Librado Payor, [address] [facility] [phone] Marcos Magdaleno [phone]  [facility], [facility] [place] [address]  Phone: [phone]  Fax: [phone]

## 2024-01-01 RX ORDER — DULOXETIN HYDROCHLORIDE 30 MG/1
30 CAPSULE, DELAYED RELEASE ORAL DAILY
Qty: 90 CAPSULE | Refills: 1 | OUTPATIENT
Start: 2024-01-01

## 2024-01-08 RX ORDER — CARVEDILOL 3.12 MG/1
3.12 TABLET ORAL 2 TIMES DAILY WITH MEALS
Qty: 60 TABLET | Refills: 1 | Status: SHIPPED | OUTPATIENT
Start: 2024-01-08

## 2024-01-08 NOTE — TELEPHONE ENCOUNTER
Requested Prescriptions     Pending Prescriptions Disp Refills    carvedilol (COREG) 3.125 MG tablet [Pharmacy Med Name: CARVEDILOL 3.125MG TABS] 60 tablet 1     Sig: TAKE ONE TABLET BY MOUTH TWICE A DAY WITH MEALS              Last Office Visit: Visit date not found     Next Office Visit: Visit date not found

## 2024-01-08 NOTE — TELEPHONE ENCOUNTER
Received refill request for Carvedilol from [facility].    Last ov:2023 NPSR    Last EK2023    Last Refill:2023    Next appointment:2024 NPRG

## 2024-01-12 ENCOUNTER — TELEPHONE (OUTPATIENT)
Dept: FAMILY MEDICINE CLINIC | Age: 84
End: 2024-01-12

## 2024-01-12 ENCOUNTER — TELEPHONE (OUTPATIENT)
Dept: INTERVENTIONAL RADIOLOGY/VASCULAR | Age: 84
End: 2024-01-12

## 2024-01-12 DIAGNOSIS — I25.10 CORONARY ARTERY DISEASE INVOLVING NATIVE HEART WITHOUT ANGINA PECTORIS, UNSPECIFIED VESSEL OR LESION TYPE: ICD-10-CM

## 2024-01-12 DIAGNOSIS — J44.9 CHRONIC OBSTRUCTIVE PULMONARY DISEASE, UNSPECIFIED COPD TYPE (HCC): ICD-10-CM

## 2024-01-12 DIAGNOSIS — M15.9 PRIMARY OSTEOARTHRITIS INVOLVING MULTIPLE JOINTS: ICD-10-CM

## 2024-01-12 DIAGNOSIS — M81.0 AGE-RELATED OSTEOPOROSIS WITHOUT CURRENT PATHOLOGICAL FRACTURE: ICD-10-CM

## 2024-01-12 DIAGNOSIS — R41.3 MEMORY LOSS DUE TO MEDICAL CONDITION: ICD-10-CM

## 2024-01-12 DIAGNOSIS — I50.23 ACUTE ON CHRONIC SYSTOLIC CHF (CONGESTIVE HEART FAILURE) (HCC): ICD-10-CM

## 2024-01-12 DIAGNOSIS — I63.9 CEREBROVASCULAR ACCIDENT (CVA), UNSPECIFIED MECHANISM (HCC): Primary | ICD-10-CM

## 2024-01-12 DIAGNOSIS — N18.32 CHRONIC RENAL FAILURE, STAGE 3B (HCC): ICD-10-CM

## 2024-01-12 NOTE — TELEPHONE ENCOUNTER
Pre-op interview done.  Verified allergies, medications and past medical history.  Instructed on procedure, npo status, medications with restrictions and transportation.  Questions answered, verbalized understanding.

## 2024-01-12 NOTE — TELEPHONE ENCOUNTER
Candance from [facility] Called and they are in need of a PT Order for PT Home Care to be sent to [facility] phone # [phone], Fax # [phone].     Please advise.

## 2024-01-17 ENCOUNTER — HOSPITAL ENCOUNTER (OUTPATIENT)
Dept: INTERVENTIONAL RADIOLOGY/VASCULAR | Age: 84
Discharge: HOME | End: 2024-01-17
Payer: MEDICARE

## 2024-01-17 VITALS
OXYGEN SATURATION: 100 % | HEIGHT: 63 IN | HEART RATE: 71 BPM | BODY MASS INDEX: 17.01 KG/M2 | TEMPERATURE: 97.1 F | RESPIRATION RATE: 15 BRPM | SYSTOLIC BLOOD PRESSURE: 127 MMHG | WEIGHT: 96 LBS | DIASTOLIC BLOOD PRESSURE: 52 MMHG

## 2024-01-17 DIAGNOSIS — D46.22 REFRACTORY ANEMIA WITH EXCESS BLASTS-2 (HCC): ICD-10-CM

## 2024-01-17 LAB
ANION GAP SERPL CALCULATED.3IONS-SCNC: 11 MMOL/L (ref 3–16)
BUN SERPL-MCNC: 49 MG/DL (ref 7–20)
CALCIUM SERPL-MCNC: 9.2 MG/DL (ref 8.3–10.6)
CHLORIDE SERPL-SCNC: 110 MMOL/L (ref 99–110)
CO2 SERPL-SCNC: 21 MMOL/L (ref 21–32)
CREAT SERPL-MCNC: 2.4 MG/DL (ref 0.6–1.2)
DEPRECATED RDW RBC AUTO: 26.1 % (ref 12.4–15.4)
GFR SERPLBLD CREATININE-BSD FMLA CKD-EPI: 20 ML/MIN/{1.73_M2}
GLUCOSE SERPL-MCNC: 93 MG/DL (ref 70–99)
HCT VFR BLD AUTO: 38.7 % (ref 36–48)
HGB BLD-MCNC: 11.8 G/DL (ref 12–16)
INR PPP: 1.15 (ref 0.84–1.16)
MCH RBC QN AUTO: 27 PG (ref 26–34)
MCHC RBC AUTO-ENTMCNC: 30.6 G/DL (ref 31–36)
MCV RBC AUTO: 88.4 FL (ref 80–100)
PLATELET # BLD AUTO: 373 K/UL (ref 135–450)
PMV BLD AUTO: 8 FL (ref 5–10.5)
POTASSIUM SERPL-SCNC: 5.6 MMOL/L (ref 3.5–5.1)
PROTHROMBIN TIME: 14.7 SEC (ref 11.5–14.8)
RBC # BLD AUTO: 4.38 M/UL (ref 4–5.2)
SODIUM SERPL-SCNC: 142 MMOL/L (ref 136–145)
WBC # BLD AUTO: 4.2 K/UL (ref 4–11)

## 2024-01-17 PROCEDURE — 2580000003 HC RX 258: Performed by: RADIOLOGY

## 2024-01-17 PROCEDURE — 6360000002 HC RX W HCPCS: Performed by: RADIOLOGY

## 2024-01-17 PROCEDURE — 99152 MOD SED SAME PHYS/QHP 5/>YRS: CPT

## 2024-01-17 PROCEDURE — 77001 FLUOROGUIDE FOR VEIN DEVICE: CPT

## 2024-01-17 PROCEDURE — 36415 COLL VENOUS BLD VENIPUNCTURE: CPT

## 2024-01-17 PROCEDURE — 7100000010 HC PHASE II RECOVERY - FIRST 15 MIN: Performed by: RADIOLOGY

## 2024-01-17 PROCEDURE — 6370000000 HC RX 637 (ALT 250 FOR IP): Performed by: RADIOLOGY

## 2024-01-17 PROCEDURE — 85610 PROTHROMBIN TIME: CPT

## 2024-01-17 PROCEDURE — 85027 COMPLETE CBC AUTOMATED: CPT

## 2024-01-17 PROCEDURE — 76937 US GUIDE VASCULAR ACCESS: CPT

## 2024-01-17 PROCEDURE — 2500000003 HC RX 250 WO HCPCS: Performed by: RADIOLOGY

## 2024-01-17 PROCEDURE — 7100000011 HC PHASE II RECOVERY - ADDTL 15 MIN: Performed by: RADIOLOGY

## 2024-01-17 PROCEDURE — 36561 INSERT TUNNELED CV CATH: CPT

## 2024-01-17 PROCEDURE — 80048 BASIC METABOLIC PNL TOTAL CA: CPT

## 2024-01-17 PROCEDURE — C1894 INTRO/SHEATH, NON-LASER: HCPCS

## 2024-01-17 RX ORDER — FENTANYL CITRATE 50 UG/ML
INJECTION, SOLUTION INTRAMUSCULAR; INTRAVENOUS PRN
Status: COMPLETED | OUTPATIENT
Start: 2024-01-17 | End: 2024-01-17

## 2024-01-17 RX ORDER — MIDAZOLAM HYDROCHLORIDE 5 MG/ML
INJECTION, SOLUTION INTRAMUSCULAR; INTRAVENOUS PRN
Status: COMPLETED | OUTPATIENT
Start: 2024-01-17 | End: 2024-01-17

## 2024-01-17 RX ORDER — LIDOCAINE HYDROCHLORIDE 10 MG/ML
10 INJECTION, SOLUTION EPIDURAL; INFILTRATION; INTRACAUDAL; PERINEURAL ONCE
Status: COMPLETED | OUTPATIENT
Start: 2024-01-17 | End: 2024-01-17

## 2024-01-17 RX ORDER — ACYCLOVIR 200 MG/1
15 CAPSULE ORAL PRN
Status: DISCONTINUED | OUTPATIENT
Start: 2024-01-17 | End: 2024-01-18 | Stop reason: HOSPADM

## 2024-01-17 RX ORDER — LIDOCAINE HYDROCHLORIDE AND EPINEPHRINE BITARTRATE 20; .01 MG/ML; MG/ML
20 INJECTION, SOLUTION SUBCUTANEOUS ONCE
Status: COMPLETED | OUTPATIENT
Start: 2024-01-17 | End: 2024-01-17

## 2024-01-17 RX ADMIN — MIDAZOLAM HYDROCHLORIDE 0.5 MG: 5 INJECTION, SOLUTION INTRAMUSCULAR; INTRAVENOUS at 08:33

## 2024-01-17 RX ADMIN — SODIUM CHLORIDE 15 ML: 9 INJECTION, SOLUTION INTRAMUSCULAR; INTRAVENOUS; SUBCUTANEOUS at 09:05

## 2024-01-17 RX ADMIN — CEFAZOLIN 2000 MG: 1 INJECTION, POWDER, FOR SOLUTION INTRAVENOUS at 08:28

## 2024-01-17 RX ADMIN — Medication: at 09:05

## 2024-01-17 RX ADMIN — FENTANYL CITRATE 25 MCG: 50 INJECTION, SOLUTION INTRAMUSCULAR; INTRAVENOUS at 08:51

## 2024-01-17 RX ADMIN — MIDAZOLAM HYDROCHLORIDE 0.5 MG: 5 INJECTION, SOLUTION INTRAMUSCULAR; INTRAVENOUS at 08:51

## 2024-01-17 RX ADMIN — FENTANYL CITRATE 50 MCG: 50 INJECTION, SOLUTION INTRAMUSCULAR; INTRAVENOUS at 08:47

## 2024-01-17 RX ADMIN — LIDOCAINE HYDROCHLORIDE 10 ML: 10 INJECTION, SOLUTION EPIDURAL; INFILTRATION; INTRACAUDAL; PERINEURAL at 09:04

## 2024-01-17 RX ADMIN — SODIUM CHLORIDE 15 ML: 9 INJECTION, SOLUTION INTRAMUSCULAR; INTRAVENOUS; SUBCUTANEOUS at 09:04

## 2024-01-17 RX ADMIN — LIDOCAINE HYDROCHLORIDE,EPINEPHRINE BITARTRATE 7 ML: 20; .01 INJECTION, SOLUTION INFILTRATION; PERINEURAL at 09:04

## 2024-01-17 ASSESSMENT — PAIN SCALES - GENERAL: PAINLEVEL_OUTOF10: 0

## 2024-01-17 NOTE — DISCHARGE INSTRUCTIONS
Doctor Youssef inserted your port today.  Follow-up only as needed.  No appointment is required by them.    Your hematologist/oncologist will follow/order your port care.      PORT-A-CATH INSERTION DISCHARGE INSTRUCTIONS    Rest today.  Advance to your regular diet as tolerated today.  You may apply ice (alternating ice on for 20min, and off for 30 min) to your incision to help with the discomfort of today's procedure.      You DO HAVE what is called a \"POWER PORT\", or power injectable port a cath.  This means you can have CT scan contrast infused in your port.    You can have labs drawn from your new port  Your Oncologist/Hemotologist will likely be the doctor to \"follow\" the care of your port and help you determine when/if it can be removed  Your surgeon who placed your port today will likely \"follow\"/care for the port until the incision heals.  Call him/her with questions when it is fresh.  Otherwise, your Oncologist/Hemotologist will help with questions/concerns.   Your port is ready for use after you leave the surgery center today.  However, you may be Xrayed at times when it is used to confirm placement before use.  If ever you port is not working as designed, whomever is caring for you should be urged to ry interventions such as cathflo, alteplase, or activase to return it to a fully functioning mode.  Your doctor will order these interventions.  A partially functioning port should not be ignored, as it puts you at risk for developing a blood clot.    If you should need emergent care, your provider may opt to use a peripheral line for quick access to help you.  Otherwise, tell your providers right away that you have an active Power Port upon arrival so it can be utilized for blood draws and infusions.        1/17/2024    Your vascular access device is ready to use.    Take it easy today.  Gradually return to your normal activity, as tolerated.    Observe the operative site for any signs of infection- such as

## 2024-01-17 NOTE — PROGRESS NOTES
Discharge instructions reviewed and understanding verbalized per pt/family with copy given. All home medications/new prescriptions have been reviewed, questions answered and patient/family state understanding. Medication information sheet provided for new prescriptions received when applicable

## 2024-01-17 NOTE — PROGRESS NOTES
Pt arrives to preproceudre area in stable condition from home. Vital signs stable. Assessement completed per flowsheet. IV patent. Procedure explained to patient who states understanding. Questions answered. Consent signed.

## 2024-01-17 NOTE — BRIEF OP NOTE
Brief Postoperative Note    Staci Menezes  YOB: 1940  [id number]    Pre-operative Diagnosis: myelodysplastic syndrome    Post-operative Diagnosis: Same    Procedure: Port placement    Anesthesia: Moderate Sedation    Surgeons: Amol Youssef MD    Estimated Blood Loss: Less than 5 mL    Complications: None    Specimens: Was Not Obtained    Findings: Successful placement of a right IJ port.    Electronically signed by Amol Youssef MD on 1/17/2024 at 9:10 AM

## 2024-01-17 NOTE — PRE SEDATION
Sedation Pre-Procedure Note    Patient Name: Staci Menezes   YOB: 1940  Room/Bed: Room/bed info not found  Medical Record Number: [id number]  Date: 1/17/2024   Time: 9:09 AM       Indication:  Port placement.    Consent: I have discussed with the patient and/or the patient representative the indication, alternatives, and the possible risks and/or complications of the planned procedure and the anesthesia methods. The patient and/or patient representative appear to understand and agree to proceed.    Vital Signs:   Vitals:    01/17/24 0900   BP: (!) 107/43   Pulse: 72   Resp: 13   Temp:    SpO2: 97%       Past Medical History:   has a past medical history of Aortic valve insufficiency, Arthritis, ASD (atrial septal defect), Atrial fibrillation (HCC), CAD (coronary artery disease), Cancer ([facility]), Chronic gouty arthropathy, Chronic kidney disease, Congestive heart failure, CVA (cerebrovascular accident), Essential tremor, Gout, Hyperlipidemia, Hypertension, Hypothyroidism, Intervertebral disc disease, Ischemic cardiomyopathy, Mitral valve stenosis, Myelodysplastic syndrome ([facility]), Peptic ulcer, and TIA (transient ischemic attack).    Past Surgical History:   has a past surgical history that includes back surgery; Cholecystectomy; Cardiac surgery; Coronary artery bypass graft (1987); shoulder surgery; Cardiac catheterization (07/2012); hip surgery (Left, 03/16/2017); joint replacement; Cardiac catheterization (08/07/2018); Percutaneous Transluminal Coronary Angio (11/04/2014); pr njx aa&/strd tfrml epi lumbar/sacral 1 level (Right, 12/03/2018); Femoral-femoral Bypass Graft (N/A, 08/22/2019); femoral bypass (Left, 08/22/2019); IR KYPHOPLASTY LUMBAR 1 VERTEBRAL BODY (05/14/2021); pacemaker placement; and Insertable Cardiac Monitor.    Medications:   Scheduled Meds:   Continuous Infusions:   PRN Meds: sodium chloride bacteriostatic  Home Meds:   Prior to Admission medications    Medication Sig Start Date End

## 2024-01-17 NOTE — PROGRESS NOTES
Pt received from radiology pt awake alert and oriented, vss, resp even and easy. Right chest incision is closed with surgical glue, family at bedside. Will monitor.

## 2024-01-18 ENCOUNTER — TELEPHONE (OUTPATIENT)
Dept: FAMILY MEDICINE CLINIC | Age: 84
End: 2024-01-18

## 2024-01-18 NOTE — PROGRESS NOTES
Spoke to patient's daughter post port procedure.   Patient denies any discomfort regarding the port post procedure.  I addressed all the patients concerns, and directed patient to contact Special Procedures if they had any further questions.

## 2024-01-18 NOTE — TELEPHONE ENCOUNTER
Manuela with [employer] arrived at patient's home this morning... she called to report the patient fell last night on her concrete driveway. Broke right kneecap and bruised face. Is currently at [facility]. Will continue care when she gets home.      Manuela can be reached at

## 2024-02-01 DIAGNOSIS — M15.9 PRIMARY OSTEOARTHRITIS INVOLVING MULTIPLE JOINTS: ICD-10-CM

## 2024-02-01 RX ORDER — HYDROCODONE BITARTRATE AND ACETAMINOPHEN 5; 325 MG/1; MG/1
1 TABLET ORAL 4 TIMES DAILY PRN
Qty: 120 TABLET | Refills: 0 | Status: SHIPPED | OUTPATIENT
Start: 2024-02-01 | End: 2024-03-02

## 2024-02-01 NOTE — TELEPHONE ENCOUNTER
Medication:   Requested Prescriptions     Pending Prescriptions Disp Refills    HYDROcodone-acetaminophen (NORCO) 5-325 MG per tablet 120 tablet 0     Sig: Take 1 tablet by mouth 4 times daily as needed for Pain for up to 30 days.      Last Filled:  12/27    Patient Phone Number: There are no phone numbers on file.    Last appt: 11/14/2023   Next appt: 2/6/2024    Last OARRS:       6/1/2021     4:12 PM   RX Monitoring   Periodic Controlled Substance Monitoring Possible medication side effects, risk of tolerance/dependence & alternative treatments discussed.     PDMP Monitoring:    Last PDMP [place] as Reviewed ([place]):  Review User Review Instant Review Result   MOHINDER GOLDSTEIN 8/4/2023  1:05 PM Reviewed PDMP [1]     Preferred Pharmacy:   [facility] [id number] - [place], [place] - [address] - P [phone] - F [phone]  [address] [address]  Phone: [phone] Fax: [phone]    [facility] - [place], [place] - [address] P [phone] - F [phone]  [address] [address]  Phone: [phone] Fax: [phone]    [facility] - [place], [place] - [address] - P [phone] - F [phone]  [address] [address]  Phone: [phone] Fax: [phone]

## 2024-02-07 ENCOUNTER — TELEPHONE (OUTPATIENT)
Dept: FAMILY MEDICINE CLINIC | Age: 84
End: 2024-02-07

## 2024-02-07 ENCOUNTER — TELEPHONE (OUTPATIENT)
Dept: CARDIOLOGY CLINIC | Age: 84
End: 2024-02-07

## 2024-02-07 NOTE — TELEPHONE ENCOUNTER
Left VM for patient to reschedule current appt with [facility] to any open time in the afternoono 03/26 at [place].

## 2024-02-08 ENCOUNTER — OFFICE VISIT (OUTPATIENT)
Dept: FAMILY MEDICINE CLINIC | Age: 84
End: 2024-02-08
Payer: MEDICARE

## 2024-02-08 VITALS
HEART RATE: 98 BPM | TEMPERATURE: 97.1 F | OXYGEN SATURATION: 96 % | SYSTOLIC BLOOD PRESSURE: 108 MMHG | DIASTOLIC BLOOD PRESSURE: 70 MMHG | RESPIRATION RATE: 12 BRPM

## 2024-02-08 DIAGNOSIS — N18.32 STAGE 3B CHRONIC KIDNEY DISEASE (HCC): ICD-10-CM

## 2024-02-08 DIAGNOSIS — M15.9 PRIMARY OSTEOARTHRITIS INVOLVING MULTIPLE JOINTS: ICD-10-CM

## 2024-02-08 DIAGNOSIS — G60.3 IDIOPATHIC PROGRESSIVE NEUROPATHY: ICD-10-CM

## 2024-02-08 DIAGNOSIS — S00.83XA FACIAL CONTUSION, INITIAL ENCOUNTER: ICD-10-CM

## 2024-02-08 DIAGNOSIS — I12.9 BENIGN HYPERTENSION WITH CHRONIC KIDNEY DISEASE, STAGE III (HCC): ICD-10-CM

## 2024-02-08 DIAGNOSIS — N18.30 BENIGN HYPERTENSION WITH CHRONIC KIDNEY DISEASE, STAGE III (HCC): ICD-10-CM

## 2024-02-08 DIAGNOSIS — E43 SEVERE MALNUTRITION (HCC): ICD-10-CM

## 2024-02-08 DIAGNOSIS — S82.001D CLOSED NONDISPLACED FRACTURE OF RIGHT PATELLA WITH ROUTINE HEALING, UNSPECIFIED FRACTURE MORPHOLOGY, SUBSEQUENT ENCOUNTER: Primary | ICD-10-CM

## 2024-02-08 DIAGNOSIS — R53.1 WEAKNESS GENERALIZED: ICD-10-CM

## 2024-02-08 DIAGNOSIS — D46.9 MYELODYSPLASTIC SYNDROME (HCC): ICD-10-CM

## 2024-02-08 DIAGNOSIS — I50.22 CHRONIC SYSTOLIC CONGESTIVE HEART FAILURE (HCC): ICD-10-CM

## 2024-02-08 PROCEDURE — G8427 DOCREV CUR MEDS BY ELIG CLIN: HCPCS | Performed by: FAMILY MEDICINE

## 2024-02-08 PROCEDURE — G8399 PT W/DXA RESULTS DOCUMENT: HCPCS | Performed by: FAMILY MEDICINE

## 2024-02-08 PROCEDURE — 1090F PRES/ABSN URINE INCON ASSESS: CPT | Performed by: FAMILY MEDICINE

## 2024-02-08 PROCEDURE — G8484 FLU IMMUNIZE NO ADMIN: HCPCS | Performed by: FAMILY MEDICINE

## 2024-02-08 PROCEDURE — 1036F TOBACCO NON-USER: CPT | Performed by: FAMILY MEDICINE

## 2024-02-08 PROCEDURE — 99214 OFFICE O/P EST MOD 30 MIN: CPT | Performed by: FAMILY MEDICINE

## 2024-02-08 PROCEDURE — 3074F SYST BP LT 130 MM HG: CPT | Performed by: FAMILY MEDICINE

## 2024-02-08 PROCEDURE — G8419 CALC BMI OUT NRM PARAM NOF/U: HCPCS | Performed by: FAMILY MEDICINE

## 2024-02-08 PROCEDURE — 1123F ACP DISCUSS/DSCN MKR DOCD: CPT | Performed by: FAMILY MEDICINE

## 2024-02-08 PROCEDURE — 3078F DIAST BP <80 MM HG: CPT | Performed by: FAMILY MEDICINE

## 2024-02-08 RX ORDER — ERGOCALCIFEROL 1.25 MG/1
CAPSULE ORAL
Qty: 12 CAPSULE | Refills: 1 | Status: SHIPPED | OUTPATIENT
Start: 2024-02-08

## 2024-02-08 RX ORDER — TOPIRAMATE 50 MG/1
TABLET, FILM COATED ORAL
Qty: 360 TABLET | Refills: 1 | Status: SHIPPED | OUTPATIENT
Start: 2024-02-08

## 2024-02-08 RX ORDER — HYDROCODONE BITARTRATE AND ACETAMINOPHEN 5; 325 MG/1; MG/1
1 TABLET ORAL 4 TIMES DAILY PRN
Qty: 120 TABLET | Refills: 0 | Status: SHIPPED | OUTPATIENT
Start: 2024-02-08 | End: 2024-03-09

## 2024-02-08 RX ORDER — LEVOTHYROXINE SODIUM 0.1 MG/1
100 TABLET ORAL DAILY
Qty: 90 TABLET | Refills: 1 | Status: SHIPPED | OUTPATIENT
Start: 2024-02-08

## 2024-02-08 RX ORDER — DULOXETIN HYDROCHLORIDE 30 MG/1
30 CAPSULE, DELAYED RELEASE ORAL DAILY
Qty: 90 CAPSULE | Refills: 1 | Status: SHIPPED | OUTPATIENT
Start: 2024-02-08

## 2024-02-08 RX ORDER — ALLOPURINOL 100 MG/1
100 TABLET ORAL DAILY
Qty: 90 TABLET | Refills: 1 | Status: SHIPPED | OUTPATIENT
Start: 2024-02-08

## 2024-02-08 RX ORDER — LOSARTAN POTASSIUM 25 MG/1
12.5 TABLET ORAL DAILY
Qty: 90 TABLET | Refills: 1 | Status: SHIPPED | OUTPATIENT
Start: 2024-02-08

## 2024-02-08 RX ORDER — CETIRIZINE HYDROCHLORIDE 5 MG/1
5 TABLET ORAL NIGHTLY
COMMUNITY
Start: 2024-02-02

## 2024-02-08 NOTE — PROGRESS NOTES
right side and 2+ on the left side.        Posterior tibial pulses are 2+ on the right side and 2+ on the left side.      Heart sounds: Murmur heard.      Systolic (right sternal border) murmur is present.      No friction rub. No gallop.   Pulmonary:      Effort: Pulmonary effort is normal.      Breath sounds: Normal breath sounds.   Abdominal:      General: Bowel sounds are normal. There is no distension.      Palpations: Abdomen is soft. Abdomen is not rigid. There is no hepatomegaly or splenomegaly.      Tenderness: There is no abdominal tenderness. There is no right CVA tenderness or left CVA tenderness.   Musculoskeletal:      Right lower leg: No edema.      Left lower leg: No edema.      Comments: Knee immobilizer in place   Skin:     General: Skin is warm.      Findings: No rash.   Neurological:      Mental Status: She is alert and oriented to person, place, and time. Mental status is at baseline.      Sensory: Sensation is intact.      Motor: Motor function is intact.      Comments: Hypersensitivity to touch in both lower extremities.   Psychiatric:         Mood and Affect: Mood normal.         Speech: Speech normal.         Behavior: Behavior is cooperative.         Cognition and Memory: Cognition is impaired. She exhibits impaired recent memory.         Assessment:      Staci was seen today for follow-up, hyperlipidemia and hypertension.    Diagnoses and all orders for this visit:    Closed nondisplaced fracture of right patella with routine healing, unspecified fracture morphology, subsequent encounter  -     External Referral To Home Health    Facial contusion, initial encounter  -     External Referral To Home Health    Severe malnutrition (HCC)  -     External Referral To Home Health    Weakness generalized  -     External Referral To Home Health    Idiopathic progressive neuropathy  -     External Referral To Home Health    Primary osteoarthritis involving multiple joints  -

## 2024-02-11 RX ORDER — CIPROFLOXACIN 500 MG/1
500 TABLET, FILM COATED ORAL 2 TIMES DAILY
Qty: 10 TABLET | Refills: 0 | Status: SHIPPED | OUTPATIENT
Start: 2024-02-11 | End: 2024-02-16

## 2024-02-15 ENCOUNTER — TELEPHONE (OUTPATIENT)
Dept: FAMILY MEDICINE CLINIC | Age: 84
End: 2024-02-15

## 2024-02-15 NOTE — TELEPHONE ENCOUNTER
DULoxetine (CYMBALTA) 30 MG extended release capsule     [facility] [id number] - [place], [place] - [address] - P [phone] - F [phone]  [address] [facility] [address]  Phone: [phone]  Fax: [phone]     Pt called stating the medication above is not helping with her nerve pain and would like to try something else.     Please advise...

## 2024-02-15 NOTE — TELEPHONE ENCOUNTER
Would recommend that she increase the dosage to 2 tablets daily and call back in 1 month with an update of her symptoms

## 2024-02-29 DIAGNOSIS — M15.9 PRIMARY OSTEOARTHRITIS INVOLVING MULTIPLE JOINTS: ICD-10-CM

## 2024-02-29 RX ORDER — HYDROCODONE BITARTRATE AND ACETAMINOPHEN 5; 325 MG/1; MG/1
1 TABLET ORAL 4 TIMES DAILY PRN
Qty: 120 TABLET | Refills: 0 | Status: SHIPPED | OUTPATIENT
Start: 2024-02-29 | End: 2024-03-30

## 2024-02-29 NOTE — TELEPHONE ENCOUNTER
Medication:   Requested Prescriptions     Pending Prescriptions Disp Refills    HYDROcodone-acetaminophen (NORCO) 5-325 MG per tablet 120 tablet 0     Sig: Take 1 tablet by mouth 4 times daily as needed for Pain for up to 30 days.      Last Filled:  2/8/24    Patient Phone Number: There are no phone numbers on file.    Last appt: 2/8/2024   Next appt: 5/9/2024    Last OARRS:       6/1/2021     4:12 PM   RX Monitoring   Periodic Controlled Substance Monitoring Possible medication side effects, risk of tolerance/dependence & alternative treatments discussed.     PDMP Monitoring:    Last PDMP [place] as Reviewed ([place]):  Review User Review Instant Review Result   MOHINDER GOLDSTEIN 8/4/2023  1:05 PM Reviewed PDMP [1]     Preferred Pharmacy:     [facility] [id number] - [place], [place] - [address] - P [phone] - F [phone]  [address] [address]  Phone: [phone] Fax: [phone]

## 2024-02-29 NOTE — TELEPHONE ENCOUNTER
HYDROcodone-acetaminophen (NORCO) 5-325 MG per tablet [[id number]     [facility] [id number] - [place], [place] - [address] - P [phone] - F [phone]  [address], [facility] [address]  Phone: [phone]  Fax: [phone]

## 2024-03-11 NOTE — TELEPHONE ENCOUNTER
topiramate (TOPAMAX) 50 MG tablet     [facility] [id number] - [place], [place] - [address] - P [phone] - F [phone] [[id number]]

## 2024-03-12 ENCOUNTER — TELEPHONE (OUTPATIENT)
Dept: FAMILY MEDICINE CLINIC | Age: 84
End: 2024-03-12

## 2024-03-12 RX ORDER — TOPIRAMATE 50 MG/1
TABLET, FILM COATED ORAL
Qty: 120 TABLET | Refills: 1 | Status: SHIPPED | OUTPATIENT
Start: 2024-03-12

## 2024-03-12 NOTE — TELEPHONE ENCOUNTER
Pt called for an update to see if script was sent to the pharmacy. Pt is completely out and needs a refill.     Please advise...

## 2024-03-15 ENCOUNTER — APPOINTMENT (OUTPATIENT)
Dept: GENERAL RADIOLOGY | Age: 84
End: 2024-03-15
Payer: MEDICARE

## 2024-03-15 ENCOUNTER — APPOINTMENT (OUTPATIENT)
Dept: CT IMAGING | Age: 84
End: 2024-03-15
Payer: MEDICARE

## 2024-03-15 ENCOUNTER — HOSPITAL ENCOUNTER (OUTPATIENT)
Age: 84
Setting detail: OBSERVATION
Discharge: HOME HEALTH | End: 2024-03-16
Attending: EMERGENCY MEDICINE | Admitting: INTERNAL MEDICINE
Payer: MEDICARE

## 2024-03-15 DIAGNOSIS — R07.9 CHEST PAIN, UNSPECIFIED TYPE: Primary | ICD-10-CM

## 2024-03-15 LAB
ALBUMIN SERPL-MCNC: 4.1 G/DL (ref 3.4–5)
ALBUMIN/GLOB SERPL: 1.4 {RATIO} (ref 1.1–2.2)
ALP SERPL-CCNC: 81 U/L (ref 40–129)
ALT SERPL-CCNC: 16 U/L (ref 10–40)
ANION GAP SERPL CALCULATED.3IONS-SCNC: 9 MMOL/L (ref 3–16)
AST SERPL-CCNC: 19 U/L (ref 15–37)
BASOPHILS # BLD: 0 K/UL (ref 0–0.2)
BASOPHILS NFR BLD: 0.8 %
BILIRUB SERPL-MCNC: <0.2 MG/DL (ref 0–1)
BUN SERPL-MCNC: 42 MG/DL (ref 7–20)
CALCIUM SERPL-MCNC: 9.2 MG/DL (ref 8.3–10.6)
CHLORIDE SERPL-SCNC: 108 MMOL/L (ref 99–110)
CO2 SERPL-SCNC: 25 MMOL/L (ref 21–32)
CREAT SERPL-MCNC: 1.3 MG/DL (ref 0.6–1.2)
D DIMER: 1.79 UG/ML FEU (ref 0–0.6)
DEPRECATED RDW RBC AUTO: 22.8 % (ref 12.4–15.4)
EOSINOPHIL # BLD: 0.3 K/UL (ref 0–0.6)
EOSINOPHIL NFR BLD: 3.9 %
GFR SERPLBLD CREATININE-BSD FMLA CKD-EPI: 41 ML/MIN/{1.73_M2}
GLUCOSE SERPL-MCNC: 108 MG/DL (ref 70–99)
HCT VFR BLD AUTO: 39.7 % (ref 36–48)
HGB BLD-MCNC: 12.6 G/DL (ref 12–16)
LYMPHOCYTES # BLD: 1 K/UL (ref 1–5.1)
LYMPHOCYTES NFR BLD: 15.2 %
MCH RBC QN AUTO: 27.8 PG (ref 26–34)
MCHC RBC AUTO-ENTMCNC: 31.6 G/DL (ref 31–36)
MCV RBC AUTO: 87.9 FL (ref 80–100)
MONOCYTES # BLD: 0.3 K/UL (ref 0–1.3)
MONOCYTES NFR BLD: 5.1 %
NEUTROPHILS # BLD: 4.8 K/UL (ref 1.7–7.7)
NEUTROPHILS NFR BLD: 75 %
NT-PROBNP SERPL-MCNC: 3518 PG/ML (ref 0–449)
PLATELET # BLD AUTO: 209 K/UL (ref 135–450)
PMV BLD AUTO: 8.6 FL (ref 5–10.5)
POTASSIUM SERPL-SCNC: 4.4 MMOL/L (ref 3.5–5.1)
PROT SERPL-MCNC: 7.1 G/DL (ref 6.4–8.2)
RBC # BLD AUTO: 4.52 M/UL (ref 4–5.2)
SODIUM SERPL-SCNC: 142 MMOL/L (ref 136–145)
TROPONIN, HIGH SENSITIVITY: 32 NG/L (ref 0–14)
TROPONIN, HIGH SENSITIVITY: 35 NG/L (ref 0–14)
WBC # BLD AUTO: 6.5 K/UL (ref 4–11)

## 2024-03-15 PROCEDURE — 85025 COMPLETE CBC W/AUTO DIFF WBC: CPT

## 2024-03-15 PROCEDURE — 93005 ELECTROCARDIOGRAM TRACING: CPT | Performed by: EMERGENCY MEDICINE

## 2024-03-15 PROCEDURE — 6370000000 HC RX 637 (ALT 250 FOR IP): Performed by: NURSE PRACTITIONER

## 2024-03-15 PROCEDURE — 71260 CT THORAX DX C+: CPT

## 2024-03-15 PROCEDURE — 80053 COMPREHEN METABOLIC PANEL: CPT

## 2024-03-15 PROCEDURE — 99285 EMERGENCY DEPT VISIT HI MDM: CPT

## 2024-03-15 PROCEDURE — 71045 X-RAY EXAM CHEST 1 VIEW: CPT

## 2024-03-15 PROCEDURE — 83880 ASSAY OF NATRIURETIC PEPTIDE: CPT

## 2024-03-15 PROCEDURE — 6360000004 HC RX CONTRAST MEDICATION: Performed by: EMERGENCY MEDICINE

## 2024-03-15 PROCEDURE — 85379 FIBRIN DEGRADATION QUANT: CPT

## 2024-03-15 PROCEDURE — 36415 COLL VENOUS BLD VENIPUNCTURE: CPT

## 2024-03-15 PROCEDURE — 84484 ASSAY OF TROPONIN QUANT: CPT

## 2024-03-15 RX ORDER — NITROGLYCERIN 0.4 MG/1
0.4 TABLET SUBLINGUAL ONCE
Status: COMPLETED | OUTPATIENT
Start: 2024-03-15 | End: 2024-03-15

## 2024-03-15 RX ORDER — FUROSEMIDE 10 MG/ML
40 INJECTION INTRAMUSCULAR; INTRAVENOUS ONCE
Status: COMPLETED | OUTPATIENT
Start: 2024-03-15 | End: 2024-03-16

## 2024-03-15 RX ADMIN — NITROGLYCERIN 0.4 MG: 0.4 TABLET, ORALLY DISINTEGRATING SUBLINGUAL at 21:15

## 2024-03-15 RX ADMIN — IOPAMIDOL 75 ML: 755 INJECTION, SOLUTION INTRAVENOUS at 21:35

## 2024-03-15 ASSESSMENT — ENCOUNTER SYMPTOMS
DIARRHEA: 0
NAUSEA: 0
VOMITING: 0
ABDOMINAL PAIN: 0
CHEST TIGHTNESS: 0
SHORTNESS OF BREATH: 0

## 2024-03-15 ASSESSMENT — PAIN DESCRIPTION - LOCATION: LOCATION: CHEST

## 2024-03-15 ASSESSMENT — HEART SCORE
ECG: NON-SPECIFC REPOLARIZATION DISTURBANCE/LBTB/PM
ECG: 1

## 2024-03-15 ASSESSMENT — PAIN SCALES - GENERAL: PAINLEVEL_OUTOF10: 7

## 2024-03-15 ASSESSMENT — PAIN DESCRIPTION - ORIENTATION: ORIENTATION: RIGHT

## 2024-03-15 ASSESSMENT — PAIN - FUNCTIONAL ASSESSMENT: PAIN_FUNCTIONAL_ASSESSMENT: 0-10

## 2024-03-15 NOTE — ED PROVIDER NOTES
[facility]  EMERGENCY DEPARTMENT ENCOUNTER        Pt Name: Staci Menezes  MRN: [id number]  Birthdate 1940  Date of evaluation: 3/15/2024  Provider: TIFFANIE Pratt - BRIA  PCP: Umesh Guidry MD  Note Started: 7:16 PM EDT 3/15/24       I have seen and evaluated this patient with my supervising physician lexii      CHIEF COMPLAINT       Chief Complaint   Patient presents with    Chest Pain     Pt reports right side CP over past few days intermittently, but now constant times 30 mins. Has watchman and pacemaker. Port to right chest       HISTORY OF PRESENT ILLNESS: 1 or more Elements     History from : Patient and Family daughter    Limitations to history : None    Staci Mneezes is a 83 y.o. female who presents to the emergency apartment with complaint of stabbing, right-sided chest pain that is been intermittently present times several days, relieved by nitroglycerin, no mitigating factors.  Patient does not take aspirin and aspirin is on her allergy list, she is uncertain of reaction, her daughter states that she was taken off the aspirin when she was diagnosed with MDS and a port was placed in the left chest in anticipation for infusions but after port placement, the patient fractured her knee.  She has not yet started treatment.    Patient reports that pain is more mild than prior to arrival.  She did not take any nitroglycerin today.  While she tells the nurse that her pain level is a 7, she tells me that is not really not bad.    She is not nauseated or sob.    Nursing Notes were all reviewed and agreed with or any disagreements were addressed in the HPI.    REVIEW OF SYSTEMS :      Review of Systems   Constitutional:  Negative for activity change, chills and fever.   Respiratory:  Negative for chest tightness and shortness of breath.    Cardiovascular:  Positive for chest pain.   Gastrointestinal:  Negative for abdominal pain, diarrhea, nausea and vomiting.    levothyroxine (SYNTHROID) tablet 100 mcg (has no administration in time range)   losartan (COZAAR) tablet 12.5 mg (has no administration in time range)   rosuvastatin (CRESTOR) tablet 5 mg (has no administration in time range)   topiramate (TOPAMAX) tablet 50 mg (has no administration in time range)   torsemide (DEMADEX) tablet 10 mg (has no administration in time range)   sodium chloride flush 0.9 % injection 5-40 mL (has no administration in time range)   sodium chloride flush 0.9 % injection 5-40 mL (has no administration in time range)   0.9 % sodium chloride infusion (has no administration in time range)   ondansetron (ZOFRAN-ODT) disintegrating tablet 4 mg (has no administration in time range)     Or   ondansetron (ZOFRAN) injection 4 mg (has no administration in time range)   acetaminophen (TYLENOL) tablet 650 mg (has no administration in time range)     Or   acetaminophen (TYLENOL) suppository 650 mg (has no administration in time range)   polyethylene glycol (GLYCOLAX) packet 17 g (has no administration in time range)   morphine (PF) injection 2 mg (has no administration in time range)   melatonin tablet 9 mg (has no administration in time range)   diphenhydrAMINE (BENADRYL) injection 50 mg (has no administration in time range)   nitroGLYCERIN (NITROSTAT) SL tablet 0.4 mg (0.4 mg SubLINGual Given 3/15/24 2115)   iopamidol (ISOVUE-370) 76 % injection 75 mL (75 mLs IntraVENous Given 3/15/24 2135)             Is this patient to be included in the SEP-1 Core Measure due to severe sepsis or septic shock?   No   Exclusion criteria - the patient is NOT to be included for SEP-1 Core Measure due to:  Infection is not suspected    CONSULTS: (Who and What was discussed)  IP CONSULT TO HOSPITALIST  IP CONSULT TO CARDIOLOGY  Discussion with Other Profesionals : Admitting Team dr. arias    Social Determinants : None    Records Reviewed : Inpatient Notes admission 11/6/2023, history of MDS on treatment, chronic CHF,

## 2024-03-16 VITALS
HEIGHT: 63 IN | HEART RATE: 70 BPM | TEMPERATURE: 97.4 F | OXYGEN SATURATION: 100 % | BODY MASS INDEX: 17.19 KG/M2 | DIASTOLIC BLOOD PRESSURE: 62 MMHG | RESPIRATION RATE: 14 BRPM | WEIGHT: 97 LBS | SYSTOLIC BLOOD PRESSURE: 157 MMHG

## 2024-03-16 PROBLEM — R07.9 CHEST PAIN: Status: ACTIVE | Noted: 2024-03-16

## 2024-03-16 LAB
ANION GAP SERPL CALCULATED.3IONS-SCNC: 12 MMOL/L (ref 3–16)
BASOPHILS # BLD: 0 K/UL (ref 0–0.2)
BASOPHILS NFR BLD: 0.5 %
BUN SERPL-MCNC: 38 MG/DL (ref 7–20)
CALCIUM SERPL-MCNC: 9.3 MG/DL (ref 8.3–10.6)
CHLORIDE SERPL-SCNC: 111 MMOL/L (ref 99–110)
CHOLEST SERPL-MCNC: 92 MG/DL (ref 0–199)
CO2 SERPL-SCNC: 21 MMOL/L (ref 21–32)
CREAT SERPL-MCNC: 1.2 MG/DL (ref 0.6–1.2)
DEPRECATED RDW RBC AUTO: 22 % (ref 12.4–15.4)
EKG ATRIAL RATE: 70 BPM
EKG DIAGNOSIS: NORMAL
EKG P AXIS: 81 DEGREES
EKG P-R INTERVAL: 326 MS
EKG Q-T INTERVAL: 416 MS
EKG QRS DURATION: 104 MS
EKG QTC CALCULATION (BAZETT): 449 MS
EKG R AXIS: -2 DEGREES
EKG T AXIS: -11 DEGREES
EKG VENTRICULAR RATE: 70 BPM
EOSINOPHIL # BLD: 0.2 K/UL (ref 0–0.6)
EOSINOPHIL NFR BLD: 3.8 %
GFR SERPLBLD CREATININE-BSD FMLA CKD-EPI: 45 ML/MIN/{1.73_M2}
GLUCOSE SERPL-MCNC: 99 MG/DL (ref 70–99)
HCT VFR BLD AUTO: 35.7 % (ref 36–48)
HDLC SERPL-MCNC: 30 MG/DL (ref 40–60)
HGB BLD-MCNC: 11.3 G/DL (ref 12–16)
LDLC SERPL CALC-MCNC: 36 MG/DL
LYMPHOCYTES # BLD: 0.9 K/UL (ref 1–5.1)
LYMPHOCYTES NFR BLD: 14.7 %
MCH RBC QN AUTO: 27.6 PG (ref 26–34)
MCHC RBC AUTO-ENTMCNC: 31.6 G/DL (ref 31–36)
MCV RBC AUTO: 87.5 FL (ref 80–100)
MONOCYTES # BLD: 0.3 K/UL (ref 0–1.3)
MONOCYTES NFR BLD: 4.8 %
NEUTROPHILS # BLD: 4.8 K/UL (ref 1.7–7.7)
NEUTROPHILS NFR BLD: 76.2 %
PLATELET # BLD AUTO: 205 K/UL (ref 135–450)
PMV BLD AUTO: 9.1 FL (ref 5–10.5)
POTASSIUM SERPL-SCNC: 3.6 MMOL/L (ref 3.5–5.1)
RBC # BLD AUTO: 4.08 M/UL (ref 4–5.2)
SODIUM SERPL-SCNC: 144 MMOL/L (ref 136–145)
TRIGL SERPL-MCNC: 128 MG/DL (ref 0–150)
TROPONIN, HIGH SENSITIVITY: 36 NG/L (ref 0–14)
TROPONIN, HIGH SENSITIVITY: 38 NG/L (ref 0–14)
VLDLC SERPL CALC-MCNC: 26 MG/DL
WBC # BLD AUTO: 6.3 K/UL (ref 4–11)

## 2024-03-16 PROCEDURE — 80048 BASIC METABOLIC PNL TOTAL CA: CPT

## 2024-03-16 PROCEDURE — G0378 HOSPITAL OBSERVATION PER HR: HCPCS

## 2024-03-16 PROCEDURE — 6360000002 HC RX W HCPCS: Performed by: INTERNAL MEDICINE

## 2024-03-16 PROCEDURE — 84484 ASSAY OF TROPONIN QUANT: CPT

## 2024-03-16 PROCEDURE — 80061 LIPID PANEL: CPT

## 2024-03-16 PROCEDURE — 85025 COMPLETE CBC W/AUTO DIFF WBC: CPT

## 2024-03-16 PROCEDURE — 93010 ELECTROCARDIOGRAM REPORT: CPT | Performed by: INTERNAL MEDICINE

## 2024-03-16 PROCEDURE — 2580000003 HC RX 258: Performed by: INTERNAL MEDICINE

## 2024-03-16 PROCEDURE — 36415 COLL VENOUS BLD VENIPUNCTURE: CPT

## 2024-03-16 PROCEDURE — 96374 THER/PROPH/DIAG INJ IV PUSH: CPT

## 2024-03-16 PROCEDURE — 6370000000 HC RX 637 (ALT 250 FOR IP): Performed by: INTERNAL MEDICINE

## 2024-03-16 PROCEDURE — 99222 1ST HOSP IP/OBS MODERATE 55: CPT | Performed by: INTERNAL MEDICINE

## 2024-03-16 RX ORDER — ROSUVASTATIN CALCIUM 10 MG/1
5 TABLET, COATED ORAL NIGHTLY
Status: DISCONTINUED | OUTPATIENT
Start: 2024-03-16 | End: 2024-03-16 | Stop reason: HOSPADM

## 2024-03-16 RX ORDER — TRAMADOL HYDROCHLORIDE 50 MG/1
50 TABLET ORAL EVERY 6 HOURS PRN
Status: DISCONTINUED | OUTPATIENT
Start: 2024-03-16 | End: 2024-03-16 | Stop reason: HOSPADM

## 2024-03-16 RX ORDER — LEVOTHYROXINE SODIUM 0.1 MG/1
100 TABLET ORAL DAILY
Status: DISCONTINUED | OUTPATIENT
Start: 2024-03-16 | End: 2024-03-16 | Stop reason: HOSPADM

## 2024-03-16 RX ORDER — ONDANSETRON 4 MG/1
4 TABLET, ORALLY DISINTEGRATING ORAL EVERY 8 HOURS PRN
Status: DISCONTINUED | OUTPATIENT
Start: 2024-03-16 | End: 2024-03-16 | Stop reason: HOSPADM

## 2024-03-16 RX ORDER — DIPHENHYDRAMINE HYDROCHLORIDE 50 MG/ML
50 INJECTION INTRAMUSCULAR; INTRAVENOUS NIGHTLY PRN
Status: DISCONTINUED | OUTPATIENT
Start: 2024-03-16 | End: 2024-03-16 | Stop reason: HOSPADM

## 2024-03-16 RX ORDER — ONDANSETRON 2 MG/ML
4 INJECTION INTRAMUSCULAR; INTRAVENOUS EVERY 6 HOURS PRN
Status: DISCONTINUED | OUTPATIENT
Start: 2024-03-16 | End: 2024-03-16 | Stop reason: HOSPADM

## 2024-03-16 RX ORDER — LANOLIN ALCOHOL/MO/W.PET/CERES
9 CREAM (GRAM) TOPICAL NIGHTLY
Status: DISCONTINUED | OUTPATIENT
Start: 2024-03-16 | End: 2024-03-16 | Stop reason: HOSPADM

## 2024-03-16 RX ORDER — FUROSEMIDE 10 MG/ML
40 INJECTION INTRAMUSCULAR; INTRAVENOUS ONCE
Status: DISCONTINUED | OUTPATIENT
Start: 2024-03-16 | End: 2024-03-16 | Stop reason: ALTCHOICE

## 2024-03-16 RX ORDER — CARVEDILOL 3.12 MG/1
3.12 TABLET ORAL 2 TIMES DAILY WITH MEALS
Status: DISCONTINUED | OUTPATIENT
Start: 2024-03-16 | End: 2024-03-16 | Stop reason: HOSPADM

## 2024-03-16 RX ORDER — MORPHINE SULFATE 2 MG/ML
2 INJECTION, SOLUTION INTRAMUSCULAR; INTRAVENOUS
Status: DISCONTINUED | OUTPATIENT
Start: 2024-03-16 | End: 2024-03-16 | Stop reason: HOSPADM

## 2024-03-16 RX ORDER — SODIUM CHLORIDE 0.9 % (FLUSH) 0.9 %
5-40 SYRINGE (ML) INJECTION PRN
Status: DISCONTINUED | OUTPATIENT
Start: 2024-03-16 | End: 2024-03-16 | Stop reason: HOSPADM

## 2024-03-16 RX ORDER — TORSEMIDE 20 MG/1
10 TABLET ORAL DAILY
Status: DISCONTINUED | OUTPATIENT
Start: 2024-03-16 | End: 2024-03-16

## 2024-03-16 RX ORDER — ACETAMINOPHEN 325 MG/1
650 TABLET ORAL EVERY 6 HOURS PRN
Status: DISCONTINUED | OUTPATIENT
Start: 2024-03-16 | End: 2024-03-16 | Stop reason: HOSPADM

## 2024-03-16 RX ORDER — LOSARTAN POTASSIUM 25 MG/1
12.5 TABLET ORAL DAILY
Status: DISCONTINUED | OUTPATIENT
Start: 2024-03-16 | End: 2024-03-16 | Stop reason: HOSPADM

## 2024-03-16 RX ORDER — HYDROCODONE BITARTRATE AND ACETAMINOPHEN 5; 325 MG/1; MG/1
1 TABLET ORAL EVERY 6 HOURS PRN
Status: DISCONTINUED | OUTPATIENT
Start: 2024-03-16 | End: 2024-03-16

## 2024-03-16 RX ORDER — POLYETHYLENE GLYCOL 3350 17 G/17G
17 POWDER, FOR SOLUTION ORAL DAILY PRN
Status: DISCONTINUED | OUTPATIENT
Start: 2024-03-16 | End: 2024-03-16 | Stop reason: HOSPADM

## 2024-03-16 RX ORDER — ACETAMINOPHEN 650 MG/1
650 SUPPOSITORY RECTAL EVERY 6 HOURS PRN
Status: DISCONTINUED | OUTPATIENT
Start: 2024-03-16 | End: 2024-03-16 | Stop reason: HOSPADM

## 2024-03-16 RX ORDER — SODIUM CHLORIDE 9 MG/ML
INJECTION, SOLUTION INTRAVENOUS PRN
Status: DISCONTINUED | OUTPATIENT
Start: 2024-03-16 | End: 2024-03-16 | Stop reason: HOSPADM

## 2024-03-16 RX ORDER — DULOXETIN HYDROCHLORIDE 30 MG/1
30 CAPSULE, DELAYED RELEASE ORAL DAILY
Status: DISCONTINUED | OUTPATIENT
Start: 2024-03-16 | End: 2024-03-16 | Stop reason: HOSPADM

## 2024-03-16 RX ORDER — SODIUM CHLORIDE 0.9 % (FLUSH) 0.9 %
5-40 SYRINGE (ML) INJECTION EVERY 12 HOURS SCHEDULED
Status: DISCONTINUED | OUTPATIENT
Start: 2024-03-16 | End: 2024-03-16 | Stop reason: HOSPADM

## 2024-03-16 RX ORDER — TOPIRAMATE 25 MG/1
50 TABLET ORAL 2 TIMES DAILY
Status: DISCONTINUED | OUTPATIENT
Start: 2024-03-16 | End: 2024-03-16 | Stop reason: HOSPADM

## 2024-03-16 RX ADMIN — CARVEDILOL 3.12 MG: 3.12 TABLET, FILM COATED ORAL at 09:10

## 2024-03-16 RX ADMIN — SODIUM CHLORIDE, PRESERVATIVE FREE 10 ML: 5 INJECTION INTRAVENOUS at 05:20

## 2024-03-16 RX ADMIN — LEVOTHYROXINE SODIUM 100 MCG: 0.1 TABLET ORAL at 09:10

## 2024-03-16 RX ADMIN — SODIUM CHLORIDE, PRESERVATIVE FREE 10 ML: 5 INJECTION INTRAVENOUS at 05:24

## 2024-03-16 RX ADMIN — DULOXETINE HYDROCHLORIDE 30 MG: 30 CAPSULE, DELAYED RELEASE ORAL at 09:10

## 2024-03-16 RX ADMIN — SODIUM CHLORIDE, PRESERVATIVE FREE 10 ML: 5 INJECTION INTRAVENOUS at 09:11

## 2024-03-16 RX ADMIN — LOSARTAN POTASSIUM 12.5 MG: 25 TABLET, FILM COATED ORAL at 09:10

## 2024-03-16 RX ADMIN — FUROSEMIDE 40 MG: 10 INJECTION, SOLUTION INTRAMUSCULAR; INTRAVENOUS at 05:20

## 2024-03-16 RX ADMIN — TOPIRAMATE 50 MG: 25 TABLET, FILM COATED ORAL at 09:12

## 2024-03-16 ASSESSMENT — PAIN SCALES - GENERAL
PAINLEVEL_OUTOF10: 0

## 2024-03-16 ASSESSMENT — ENCOUNTER SYMPTOMS
GASTROINTESTINAL NEGATIVE: 1
SHORTNESS OF BREATH: 1

## 2024-03-16 NOTE — CARE COORDINATION
Discharge Planning Note:  Chart reviewed for discharge needs  and it appears that patient has minimal needs for discharge at this time.    Patient was active with skilled c services via  [address],   [place] [address]  Phone: [phone]  Fax: [phone]   Please notify case management if any discharge needs are identified.      Case management will continue to follow progress and update discharge plan as needed.

## 2024-03-16 NOTE — PLAN OF CARE
Problem: Pain  Goal: Verbalizes/displays adequate comfort level or baseline comfort level  Outcome: Progressing     Problem: Hematologic - Adult  Goal: Maintains hematologic stability  Outcome: Progressing    Pt had been admitted from ED to 3T at 0115. Pt had refused to take x1 IV dose Lasix at that time but agreeable to change time to AM dose. Hat in toilet for I/O. VS obtained and x1 IV dose lasix given; see MAR & all flowsheets. .     03/16/24 0516   Vitals   Temp 97.5 °F (36.4 °C)   Temp Source Oral   Pulse 70   Heart Rate Source Brachial;Radial   Respirations 14   BP (!) 156/56   MAP (Calculated) 89   BP Location Right upper arm   BP Upper/Lower Upper   BP Method Automatic   Patient Position Turns self;Left side   Cardiac Rhythm AV paced   Ectopy PVC   Pain Assessment   Pain Assessment None - Denies Pain   Pain Level 0   Oxygen Therapy   SpO2 99 %   Pulse Oximetry Type Intermittent   Pulse Oximeter Device Location Finger   O2 Device None (Room air)

## 2024-03-16 NOTE — PLAN OF CARE
Problem: Pain  Goal: Verbalizes/displays adequate comfort level or baseline comfort level  Outcome: Progressing     Problem: Hematologic - Adult  Goal: Maintains hematologic stability  Outcome: Progressing   Patient admitted from emergency department via w/c on tele-monitor per charge RN. Transferred to bed.  Vital signs obtained. Orders reviewed and acknowledged. Oriented to room and environment. Questions answered. Bed placed in low position. Call light explained and within reach.      03/16/24 0115 03/16/24 0130   Vitals   Temp 97.8 °F (36.6 °C)  --    Temp Source Oral  --    Pulse 70 72   Heart Rate Source Brachial;Radial Telemetry   Respirations 15  --    BP (!) 177/70 (!) 162/66   MAP (Calculated) 106 98   BP Location Right upper arm Right upper arm   BP Upper/Lower Upper Upper   BP Method Automatic Automatic   Patient Position  --  Semi fowlers   Cardiac Rhythm AV paced AV paced   Ectopy PVC PVC   Pain Assessment   Pain Assessment None - Denies Pain  --    Pain Level 0  --    Opioid-Induced Sedation   POSS Score 1  --    Oxygen Therapy   SpO2 100 %  --    Pulse Oximetry Type Intermittent  --    Pulse Oximeter Device Mode Intermittent  --    Pulse Oximeter Device Location Finger  --    O2 Device None (Room air)  --

## 2024-03-16 NOTE — DISCHARGE INSTR - COC
Continuity of Care Form    Patient Name: Staci Menezes   :  1940  MRN:  [id number]    Admit date:  3/15/2024  Discharge date:  3/16/2024    Code Status Order: Full Code   Advance Directives:     Admitting Physician:  Cole Wallace MD  PCP: Umesh Guidry MD    Discharging Nurse: LAQUITA Luz   Discharging Hospital Unit/Room#: 3TN-3370/3370-01  Discharging Unit Phone Number: [phone]    Emergency Contact:   Extended Emergency Contact Information  Primary Emergency Contact: Caryl Mills  Address: [address] [place] [address] [facility]  Home Phone: [phone]  Mobile Phone: [phone]  Relation: Child  Secondary Emergency Contact: LESLI HOOVER  Home Phone: [phone]  Relation: Child    Past Surgical History:  Past Surgical History:   Procedure Laterality Date    BACK SURGERY      CARDIAC CATHETERIZATION  2012    CARDIAC CATHETERIZATION  2018    Unsuccesful  of RCA    CARDIAC SURGERY      CABG & Cardiac ablation    CHOLECYSTECTOMY      CORONARY ARTERY BYPASS GRAFT  1987    LIMA- Diag/LAD, SVG- RCA    FEMORAL BYPASS Left 2019    LEFT FEMORAL TO POPLITEAL BYPASS GRAFT performed by Shankar Muñiz II, MD at [facility] OR    FEMORAL-FEMORAL BYPASS GRAFT N/A 2019    FEMORAL TO FEMORAL BYPASS performed by Shankar Muñiz II, MD at [facility] OR    HIP SURGERY Left 2017    Left hip pinning    INSERTABLE CARDIAC MONITOR      Watchman    IR KYPHOPLASTY LUMBAR FIRST LEVEL  2021    IR KYPHOPLASTY LUMBAR FIRST LEVEL 2021 [facility] PROCEDURES    IR PORT PLACEMENT EQUAL OR GREATER THAN 5 YEARS  2024    IR PORT PLACEMENT EQUAL OR GREATER THAN 5 YEARS 2024 Amol Youssef MD [facility] PROCEDURES    JOINT REPLACEMENT      PACEMAKER PLACEMENT      MN NJX AA&/STRD TFRML EPI LUMBAR/SACRAL 1 LEVEL Right 2018    RIGHT L3 AND L4 LUMBAR TRANSFORAMINAL EPIRUAL STEROID INJECTION WITH FLUOROSCOPY performed by Heidi Pinzon MD at [facility] SIC     PTCA  11/04/2014    MARLENY - 3.0 x 28 to the Ist Diag    SHOULDER SURGERY      left       Immunization History:   Immunization History   Administered Date(s) Administered    COVID-19, MODERNA BLUE border, Primary or Immunocompromised, (age 12y+), IM, 100 mcg/0.5mL 01/28/2021, 02/25/2021, 11/16/2021    Influenza 09/19/2012, 10/02/2013    Influenza A (N4F6-68) Vaccine IM 12/21/2009    Influenza Virus Vaccine 10/03/2005, 10/12/2007, 09/28/2012, 10/04/2013, 10/20/2014, 10/05/2015    Influenza Whole 09/14/2011    Influenza, FLUAD, (age 65 y+), Adjuvanted, 0.5mL 11/02/2021, 10/28/2022, 11/06/2023    Influenza, FLUBLOK, (age 18 y+), PF, 0.5mL 10/12/2020    Influenza, High Dose (Fluzone 65 yrs and older) 10/10/2014, 09/30/2015, 11/02/2016, 10/24/2017, 09/17/2018    Influenza, Triv, inactivated, subunit, adjuvanted, IM (Fluad 65 yrs and older) 09/30/2019    Pneumococcal Conjugate 7-valent (Prevnar7) 12/03/2009    Pneumococcal, PCV-13, PREVNAR 13, (age 6w+), IM, 0.5mL 12/04/2015    Pneumococcal, PPSV23, PNEUMOVAX 23, (age 2y+), SC/IM, 0.5mL 09/28/2000, 01/28/2008, 02/27/2023    TDaP, ADACEL (age 10y-64y), BOOSTRIX (age 10y+), IM, 0.5mL 08/17/2011, 05/12/2020, 12/08/2021       Active Problems:  Patient Active Problem List   Diagnosis Code    Essential hypertension, benign I10    Mixed hyperlipidemia E78.2    Chronic gouty arthropathy M1A.00X0    Acquired hypothyroidism E03.9    Non-rheumatic mitral regurgitation I34.0    COPD (chronic obstructive pulmonary disease) ([facility]) J44.9    Restrictive lung disease J98.4    Sick sinus syndrome ([facility]) I49.5    Allergic rhinitis J30.9    Fibrocystic breast N60.19    Cerebrovascular accident (CVA) ([facility]) I63.9    Pacemaker Z95.0    Primary osteoarthritis involving multiple joints M15.9    Vitamin D deficiency E55.9    Tremor R25.1    Chronic total occlusion of native coronary artery I25.10, I25.82    Age related osteoporosis M81.0    Chronic combined systolic (congestive) and diastolic

## 2024-03-16 NOTE — ED PROVIDER NOTES
I have personally performed a face to face diagnostic evaluation on this patient. I have fully participated in the care of this patient I personally saw the patient and performed a substantive portion of the visit including all aspects of the medical decision making.  I have reviewed and agree with all pertinent clinical information including history, physical exam, diagnostic tests, and the plan.      HPI: Staci Menezes presented with right-sided chest pain intermittently over the last few days.  Now constant for 30 minutes prior to arrival.  History of prior CABG, watchman and pacemaker.  History of multiple prior stents.  CAD, COPD, mild dysplastic syndrome.  Symptoms are sharp worse with deep breathing came on at rest but were better with rest, sitting down, nitro.  Chief Complaint   Patient presents with    Chest Pain     Pt reports right side CP over past few days intermittently, but now constant times 30 mins. Has watchman and pacemaker. Port to right chest      Review of Systems: See [facility] note  Vital Signs: BP (!) 133/51   Pulse 75   Temp 97.7 °F (36.5 °C) (Oral)   Resp 22   Wt 43.5 kg (96 lb)   SpO2 100%   BMI 17.01 kg/m²     Alert 83 y.o. female who appears cachectic  HENT: Atraumatic, oral mucosa moist  Neck: Grossly normal ROM  Chest/Lungs: respiratory effort normal, port in the right side of the chest  Abdomen: Soft nontender  Extremities: 2+ radial bilaterally  Musculoskeletal: Grossly normal ROM  Skin: No palor or diaphoresis    Medical Decision Making and Plan:  Pertinent Labs & Imaging studies reviewed. (See [facility] chart for details)  I agree with [facility] assessment and plan.  83-year-old female with history of CAD, prior CABG, watchman's, CKD, mild dysplastic syndrome who presents for acute chest pain which is been fluctuating intensity worsening over the last 3 days now constant concerning for possible unstable angina.  Patient has high heart score.  Troponin is elevated but nontrending.   Patient is afebrile not tachycardic saturating well on room air.  Chest pain is not reproducible.  Given the patient's symptoms are worsening over the last 3 days she has not had a cath or further heart evaluation sent stenting in 2022.  Will plan on admission at this time.    I personally saw the patient and independently provided 0 minutes of nonconcurrent critical care out of the total shared critical care time provided    This includes multiple reevaluations, vital sign monitoring, pulse oximetry monitoring, telemetry monitoring, clinical response to the IV medications, reviewing the nursing notes, consultation time, dictation/documentation time, and interpretation of the labwork. (This time excludes time spent performing procedures).      EKG: All EKG's are interpreted by the Emergency Department Physician who either signs or Co-signs this chart in the absence of a cardiologist.    EKG Interpretation    Interpreted by emergency department physician    Rhythm:  Electronic atrial pacemaker  Rate: normal  Axis: normal  Ectopy: none  Conduction: normal  ST Segments: nonspecific changes  T Waves: non specific changes  Q Waves: none    Clinical Impression: Electronic atrial pacemaker with normal NV interval normal QRS duration normal QT QTc.  Normal axis.  Nonspecific ST and T wave changes.  Interpreted by myself.    MD Frank Mujica Stephen W, MD  03/15/24 4640

## 2024-03-16 NOTE — CONSULTS
[facility]  Cardiology Note  [phone]    Chief Complaint   Patient presents with    Chest Pain     Pt reports right side CP over past few days intermittently, but now constant times 30 mins. Has watchman and pacemaker. Port to right chest      History of Present Illness:  Staci Menezes is a 83 y.o. patient PMHx CABG, CKD, HFPEF admitted with chest pain and positive troponin.    Patient reports acute onset of R sided chest pain at her port site yesterday for which she was evaluated. EKG was stable her chest pain has remained constant. Not worsened with exertion     Past Medical History:   has a past medical history of Aortic valve insufficiency, Arthritis, ASD (atrial septal defect), Atrial fibrillation (HCC), CAD (coronary artery disease), Cancer (HCC), Chronic gouty arthropathy, Chronic kidney disease, Congestive heart failure, CVA (cerebrovascular accident), Essential tremor, Gout, Hyperlipidemia, Hypertension, Hypothyroidism, Intervertebral disc disease, Ischemic cardiomyopathy, Mitral valve stenosis, Myelodysplastic syndrome (HCC), Peptic ulcer, and TIA (transient ischemic attack).    Surgical History:   has a past surgical history that includes back surgery; Cholecystectomy; Cardiac surgery; Coronary artery bypass graft (1987); shoulder surgery; Cardiac catheterization (07/2012); hip surgery (Left, 03/16/2017); joint replacement; Cardiac catheterization (08/07/2018); Percutaneous Transluminal Coronary Angio (11/04/2014); pr njx aa&/strd tfrml epi lumbar/sacral 1 level (Right, 12/03/2018); Femoral-femoral Bypass Graft (N/A, 08/22/2019); femoral bypass (Left, 08/22/2019); IR KYPHOPLASTY LUMBAR 1 VERTEBRAL BODY (05/14/2021); pacemaker placement; Insertable Cardiac Monitor; and IR PORT PLACEMENT > 5 YEARS (1/17/2024).     Social History:   reports that she quit smoking about 12 years ago. Her smoking use included cigarettes. She started smoking about 40 years ago. She has a 28.0 pack-year smoking  reviewed  Echo, cath, and medications and labs reviewed  High complexity/medical decision making due to extensive data review, extensive history review, independent review of data  High risk due to acute illness, evaluation of drug-drug interactions, medication management and diagnostic interventions    Assessment  Patient Active Problem List   Diagnosis    Essential hypertension, benign    Mixed hyperlipidemia    Chronic gouty arthropathy    Acquired hypothyroidism    Non-rheumatic mitral regurgitation    COPD (chronic obstructive pulmonary disease) ([facility])    Restrictive lung disease    Sick sinus syndrome ([facility])    Allergic rhinitis    Fibrocystic breast    Cerebrovascular accident (CVA) ([facility])    Pacemaker    Primary osteoarthritis involving multiple joints    Vitamin D deficiency    Tremor    Chronic total occlusion of native coronary artery    Age related osteoporosis    Chronic combined systolic (congestive) and diastolic (congestive) heart failure ([facility])    Chronic kidney disease    PAD (peripheral artery disease) ([facility])    Atherosclerosis of native arteries of extremities with intermittent claudication, bilateral legs ([facility])    Idiopathic peripheral neuropathy    Ischemic cardiomyopathy    Peripheral vertigo, bilateral    Paroxysmal atrial fibrillation ([facility])    Dyslipidemia    Iron deficiency anemia    Bilateral sensorineural hearing loss    Memory loss due to medical condition    Symptomatic bradycardia    Pacemaker lead failure    Presence of Watchman left atrial appendage closure device    Chronic right sacroiliac pain    Postcholecystectomy diarrhea    Chronic renal failure, stage 3b ([facility])    Coronary artery disease involving native heart without angina pectoris, unspecified vessel or lesion type    Dysphonia    Severe malnutrition ([facility])    Former smoker    SOB (shortness of breath)    Pancytopenia ([facility])    Acute on chronic systolic CHF (congestive heart failure) ([facility])    Myelodysplastic syndrome ([facility])

## 2024-03-16 NOTE — ED NOTES
ED TO INPATIENT SBAR HANDOFF    Patient Name: Staci Menezes   :  1940  83 y.o.   MRN:  [id number]  Preferred Name  Staci Menezes  ED Room #:  ED-0005/05  Family/Caregiver Present no   Restraints no   Sitter no   Sepsis Risk Score Sepsis Risk Score: 1.97    Situation  Code Status: Prior No additional code details.    Allergies: Aspirin, Diltiazem, Diltiazem hcl, Lorazepam, Sulfa antibiotics, Atorvastatin, Dabigatran, Dabrafenib, Lisinopril, Mysoline [primidone], Nsaids, Other, and Primidone  Weight: Patient Vitals for the past 96 hrs (Last 3 readings):   Weight   03/15/24 1847 43.5 kg (96 lb)     Arrived from: home  Chief Complaint:   Chief Complaint   Patient presents with    Chest Pain     Pt reports right side CP over past few days intermittently, but now constant times 30 mins. Has watchman and pacemaker. Port to right chest     Hospital Problem/Diagnosis:  Principal Problem:    Chest pain  Resolved Problems:    * No resolved hospital problems. *    Imaging:   CT CHEST PULMONARY EMBOLISM W CONTRAST   Final Result   1. No findings of pulmonary embolism.   2. Cardiovascular findings that can be seen with pulmonary hypertension.   3. Minimal interstitial pulmonary edema potentially due to congestive heart   failure given mild cardiomegaly.   4. Mild bronchial wall thickening potentially due pulmonary vascular   congestion, reactive airways disease, or bronchitis.   5. Minimal centrilobular and paraseptal emphysema.         XR CHEST PORTABLE   Final Result   1. Pulmonary vascular congestion and mild cardiomegaly.   2. At least trace bilateral pleural effusions.           Abnormal labs:   Abnormal Labs Reviewed   CBC WITH AUTO DIFFERENTIAL - Abnormal; Notable for the following components:       Result Value    RDW 22.8 (*)     All other components within normal limits   COMPREHENSIVE METABOLIC PANEL - Abnormal; Notable for the following components:    Glucose 108 (*)     BUN 42 (*)     Creatinine 1.3 (*)

## 2024-03-16 NOTE — H&P
HISTORY & PHYSICAL        Date of Admission:  03/16/24  MRN: [id number]  Date of Service: 03/16/24  Location:  [facility]       CC:    Chief Complaint   Patient presents with    Chest Pain     Pt reports right side CP over past few days intermittently, but now constant times 30 mins. Has watchman and pacemaker. Port to right chest        HISTORY OF PRESENT ILLNESS:     Staci Menezes is an 83 F with hx of HTN, CAD, CKD3, and MDS presented for evaluation of a several day hx of right sided chest pain mostly around her port site.  Pt is an otherwise poor historian secondary to underlying cognitive decline.  Hx obtained per pt's daughter, Mellisa, by phone.  Daughter indicates pt started c/o right sided chest pain several days ago.  She does have some associated shortness of breath.  Pain is increased with deep inspiration.  When she complained again this evening, she felt it best to make sure it was not cardiac related.  Pt points to the region around her port as a source of pain.  She otherwise denies any fevers, chills, N/V, lightheadedness, abdominal pain, or leg swelling.       In the ER, pt normotensive, afebrile.  Labs remarkable for Cr 1.3.  proBNP and troponin were mildly elevated, non-trending.  Dimer was elevated, CTPA negative.     Case discussed with ED attending for admission.  External medical records reviewed.    PAST MEDICAL HX:  Past Medical History:   Diagnosis Date    Aortic valve insufficiency     Arthritis     ASD (atrial septal defect)     Atrial fibrillation (HCC)     CAD (coronary artery disease)     Cancer (HCC)     Chronic gouty arthropathy     Chronic kidney disease     Congestive heart failure     CVA (cerebrovascular accident)     Essential tremor     Gout     Hyperlipidemia     Hypertension     Hypothyroidism     Intervertebral    HENT: Negative.     Respiratory:  Positive for shortness of breath.    Cardiovascular: Negative.         Chest wall pain   Gastrointestinal: Negative.    Genitourinary: Negative.    Musculoskeletal: Negative.    Skin: Negative.    Neurological: Negative.    Hematological: Negative.    Psychiatric/Behavioral: Negative.     All other systems reviewed and are negative.       PHYSICAL EXAM:   BP (!) 162/66   Pulse 72   Temp 97.8 °F (36.6 °C) (Oral)   Resp 15   Ht 1.6 m (5' 3\")   Wt 44.6 kg (98 lb 5.2 oz)   SpO2 100%   BMI 17.42 kg/m²     Physical Exam  Vitals reviewed.   Constitutional:       General: She is not in acute distress.     Appearance: Normal appearance. She is well-developed. She is not diaphoretic.      Comments: Thin, frail   HENT:      Head: Normocephalic and atraumatic.      Mouth/Throat:      Mouth: Mucous membranes are moist.   Eyes:      Extraocular Movements: Extraocular movements intact.      Pupils: Pupils are equal, round, and reactive to light.   Cardiovascular:      Rate and Rhythm: Normal rate and regular rhythm.      Pulses: Normal pulses.      Heart sounds: Normal heart sounds. No murmur heard.     No friction rub. No gallop.   Pulmonary:      Effort: Pulmonary effort is normal. No respiratory distress.      Breath sounds: Normal breath sounds. No wheezing or rales.   Chest:      Chest wall: No tenderness.   Abdominal:      General: Bowel sounds are normal. There is no distension.      Palpations: Abdomen is soft. There is no mass.      Tenderness: There is no abdominal tenderness. There is no guarding.   Musculoskeletal:         General: No tenderness or deformity.      Cervical back: Normal range of motion and neck supple.      Right lower leg: No edema.      Left lower leg: No edema.   Skin:     General: Skin is warm and dry.      Capillary Refill: Capillary refill takes less than 2 seconds.      Comments: Right chest wall port.  Tenderness over right axillary region adjacent to

## 2024-03-16 NOTE — PROGRESS NOTES
Data- discharge order received, pt verbalized agreement to discharge, needs for Home Health Care with [facility] for PT, JUANI reviewed and signed by MD, to be completed by RN.    Action- AVS prepared, discharge instructions prepared and given to pt with dtr at bedside, medication information packet given r/t NEW or CHANGED prescriptions, pt verbalized understanding further self-review.   D/C instruction summary: Diet- regular, Activity- as tolerated, follow up with Primary Care Physician Umesh Guidry -[phone] appointment as scheduled, immunizations reviewed, medications prescriptions to be filled na.     Response- Case Management/ reported faxing completed JUANI and AVS to needed HHC/DME services stated above.   Pt belongings gathered, IV removed, pt dressed. Disposition is home with HHC/DME as stated above, transported with personal, taken to lobby via w/c with RN, no complications.     1. WEIGHT: Admit Weight - Scale: 43.5 kg (96 lb) (03/15/24 1847)        Today  Weight - Scale: 44 kg (97 lb) (03/16/24 0743)       2. O2 SAT.: SpO2: 100 % (03/16/24 0736)  
01:41 PM    COLORU DARK YELLOW 12/11/2023 01:41 PM    PHUR 5.0 12/11/2023 01:41 PM    WBCUA 10-20 12/11/2023 01:41 PM    RBCUA 5-10 12/11/2023 01:41 PM    YEAST neg 03/09/2021 02:00 PM    BACTERIA 3+ 12/11/2023 01:41 PM    CLARITYU TURBID 12/11/2023 01:41 PM    SPECGRAV 1.020 12/11/2023 01:41 PM    LEUKOCYTESUR TRACE 12/11/2023 01:41 PM    UROBILINOGEN 1.0 12/11/2023 01:41 PM    BILIRUBINUR SMALL 12/11/2023 01:41 PM    BILIRUBINUR NEGATIVE 12/04/2011 06:40 PM    BLOODU Negative 12/11/2023 01:41 PM    GLUCOSEU Negative 12/11/2023 01:41 PM    GLUCOSEU NEGATIVE 12/04/2011 06:40 PM    KETUA TRACE 12/11/2023 01:41 PM     Urine Cultures:   Lab Results   Component Value Date/Time    LABURIN >100,000 CFU/ml 09/30/2023 10:25 AM     Blood Cultures:   Lab Results   Component Value Date/Time    BC No Growth after 4 days of incubation. 11/07/2023 02:52 AM     Lab Results   Component Value Date/Time    BLOODCULT2 No Growth after 4 days of incubation. 11/07/2023 05:01 AM     Organism:   Lab Results   Component Value Date/Time    ORG Klebsiella pneumoniae 09/30/2023 10:25 AM         Electronically signed by Karlee Mishra MD on 3/16/2024 at 10:35 AM

## 2024-03-16 NOTE — CARE COORDINATION
Case Management -  Discharge Note      Patient Name: Staci Menezes                   YOB: 1940            Readmission Risk (Low < 19, Mod (19-27), High > 27): Readmission Risk Score: 24.9    Current PCP: Umesh Guidry MD    Patient/patient representative has been educated on the benefits of HHC as well as the possible risks of declining recommended services. Patient/patient representative has acknowledged the information provided and decided on the following discharge plan. Patient/ patient representative has been provided freedom of choice regarding service provider, supported by basic dialogue that supports the patient's individualized plan of care/goals.    Home Care Information:   Is patient resuming current home health care services: Yes -     Home Care Agency:   [facility]  [address] [place] [address] [address],   [place] [address]  Phone: [phone]  Fax: [phone]             Services: Skilled Nursing, PT & OT  Home Health Order Obtained: N/A  ( Pt in observation and was already active.     Home health agency notified of discharge. - Tenisha in Intake     Financial    Payor: MEDICARE / Plan: MEDICARE PART A AND B / Product Type: *No Product type* /     Pharmacy:  Potential assistance Purchasing Medications:    Meds-to-Beds request:        [facility] [id number] - [address] - P [phone] - F [phone]  [address] [place], [place] [address]  Phone: [phone] Fax: [phone]    [facility] - [place] - [facility] - P [phone] - F [phone]  [facility]  [place] [id number]  Phone: [phone] Fax: [phone]      Notes:    Additional Case Management Notes:   Family will transport patient home    Electronically signed by CHARLES FIGUEREDO RN /BSN/CCM on 3/16/24 at 2:53 PM EDT

## 2024-03-18 ENCOUNTER — TELEPHONE (OUTPATIENT)
Dept: FAMILY MEDICINE CLINIC | Age: 84
End: 2024-03-18

## 2024-03-18 NOTE — TELEPHONE ENCOUNTER
Care Transitions Initial Follow Up Call    Outreach made within 2 business days of discharge: Yes    Patient: Staic Menezes Patient : 1940   MRN: [id number]  Reason for Admission: chest pain  Discharge Date: 3/16/24       Spoke with: Pt daughter    Discharge department/facility: [facility] Interactive Patient Contact:  Was patient able to fill all prescriptions: Yes  Was patient instructed to bring all medications to the follow-up visit: Yes  Is patient taking all medications as directed in the discharge summary? Yes  Does patient understand their discharge instructions: Yes  Does patient have questions or concerns that need addressed prior to 7-14 day follow up office visit: yes - skin concern on buttock    Scheduled appointment with PCP within 7-14 days    Follow Up  Future Appointments   Date Time Provider Department Center   3/21/2024  1:30 PM Shaggy Saucedo MD [facility]   3/26/2024  1:15 PM Tory Mcgovern APRN - CNS [place] [facility]   2024  3:00 PM Umesh Guidry MD [facility] [place] - [facility]   2024 11:00 AM HERB, SAVANNA DEVICE CHECK [facility] Card[facility]   2024 11:00 AM Chele Raza APRN - CNP [facility]       Jaylene Long LPN

## 2024-03-21 NOTE — DISCHARGE SUMMARY
[facility]    Patient Demographics    Patient. Staci Menezes  Date of Birth. 1940  MRN. [id number]     Primary care provider. Umesh Guidry MD  (Tel: [phone])    Admit date: 3/15/2024    Discharge date (blank if same as Note Date): 3/16/2024  Note Date: 3/21/2024     Reason for Hospitalization.   Chief Complaint   Patient presents with    Chest Pain     Pt reports right side CP over past few days intermittently, but now constant times 30 mins. Has watchman and pacemaker. Port to right chest           Problem-based Hospital Course.  Chest pain likely atypical ischemia was ruled out evaluated by cardiology  Patient admitted for chest pain. Initial evaluation w as negative for any acute ischemia.    troponin and ekg negative  Pt was discharged stable       Consults.  IP CONSULT TO HOSPITALIST  IP CONSULT TO CARDIOLOGY    Physical examination on discharge day.   BP (!) 157/62   Pulse 70   Temp 97.4 °F (36.3 °C) (Oral)   Resp 14   Ht 1.6 m (5' 3\")   Wt 44 kg (97 lb)   SpO2 100%   BMI 17.18 kg/m²   General appearance.  Alert. Looks comfortable.  HEENT. Sclera clear. Moist mucus membranes.  Cardiovascular. Regular rate and rhythm, normal S1, S2. No murmur.   Respiratory. Not using accessory muscles.Clear to auscultation bilaterally, no wheeze.  Gastrointestinal. Abdomen soft, non-tender, not distended, normal bowel sounds  Neurology. Facial symmetry. No speech deficits. Moving all extremities equally.  Extremities. No edema in lower extremities.  Skin. Warm, dry, normal turgor    Condition at time of discharge stable     Medication instructions provided to patient at discharge.     Medication List        CONTINUE taking these medications      allopurinol 100 MG tablet  Commonly known as: ZYLOPRIM  Take 1 tablet by mouth daily  Notes to patient: Use: for prevention of

## 2024-03-25 NOTE — PROGRESS NOTES
allopurinol (ZYLOPRIM) 100 MG tablet Take 1 tablet by mouth daily 90 tablet 1    carvedilol (COREG) 3.125 MG tablet TAKE ONE TABLET BY MOUTH TWICE A DAY WITH MEALS 60 tablet 1    torsemide (DEMADEX) 10 MG tablet Take 1 tablet by mouth daily 30 tablet 1    rosuvastatin (CRESTOR) 5 MG tablet TAKE 1 TABLET EVERY NIGHT 90 tablet 1     No current facility-administered medications for this visit.     Allergies:  Allergies   Allergen Reactions    Aspirin Nausea Only    Diltiazem Anaphylaxis    Diltiazem Hcl Anaphylaxis    Lorazepam Other (See Comments)     hallucinations  hallucinations  hallucinations    Sulfa Antibiotics Rash and Hives    Atorvastatin Other (See Comments)     Muscle pains  Muscle pains  Muscle pains    Dabigatran Nausea Only     And indigestion  And indigestion    Aka Pradaxa  And indigestion  And indigestion  And indigestion    Aka Pradaxa  And indigestion  And indigestion  And indigestion    Aka Pradaxa    Dabrafenib     Lisinopril      cough    Mysoline [Primidone]     Nsaids      Other reaction(s): Unknown    Other Other (See Comments)     Nitroglycerin patches causes severe headaches    Primidone      Other reaction(s): Unknown     Social History:  Social History     Tobacco Use    Smoking status: Former     Current packs/day: 0.00     Average packs/day: 1 pack/day for 28.0 years (28.0 ttl pk-yrs)     Types: Cigarettes     Start date: 1984     Quit date: 2012     Years since quittin.2    Smokeless tobacco: Never   Vaping Use    Vaping Use: Never used   Substance Use Topics    Alcohol use: Yes     Comment: occasionally    Drug use: No        ROS and PHYSICAL:   ROS:  10 point ROS otherwise negative except as mentioned in the HPI    VITALS:  Vitals:    24 1126   BP: 128/68   Site: Left Upper Arm   Position: Sitting   Cuff Size: Child   Pulse: 69   Temp: 97.9 °F (36.6 °C)   TempSrc: Infrared   SpO2: 94%   Weight: 42.8 kg (94 lb 6.4 oz)      Body mass index is 16.72 kg/m².    PHYSICAL

## 2024-03-26 ENCOUNTER — OFFICE VISIT (OUTPATIENT)
Dept: CARDIOLOGY CLINIC | Age: 84
End: 2024-03-26
Payer: MEDICARE

## 2024-03-26 ENCOUNTER — OFFICE VISIT (OUTPATIENT)
Dept: FAMILY MEDICINE CLINIC | Age: 84
End: 2024-03-26
Payer: MEDICARE

## 2024-03-26 ENCOUNTER — TELEPHONE (OUTPATIENT)
Dept: CARDIOLOGY CLINIC | Age: 84
End: 2024-03-26

## 2024-03-26 VITALS
DIASTOLIC BLOOD PRESSURE: 60 MMHG | BODY MASS INDEX: 16.66 KG/M2 | SYSTOLIC BLOOD PRESSURE: 110 MMHG | HEIGHT: 63 IN | WEIGHT: 94 LBS | HEART RATE: 74 BPM | OXYGEN SATURATION: 97 %

## 2024-03-26 VITALS
OXYGEN SATURATION: 94 % | BODY MASS INDEX: 16.72 KG/M2 | TEMPERATURE: 97.9 F | WEIGHT: 94.4 LBS | SYSTOLIC BLOOD PRESSURE: 128 MMHG | HEART RATE: 69 BPM | DIASTOLIC BLOOD PRESSURE: 68 MMHG

## 2024-03-26 DIAGNOSIS — I25.10 CORONARY ARTERY DISEASE INVOLVING NATIVE HEART WITHOUT ANGINA PECTORIS, UNSPECIFIED VESSEL OR LESION TYPE: ICD-10-CM

## 2024-03-26 DIAGNOSIS — N28.9 RENAL INSUFFICIENCY: ICD-10-CM

## 2024-03-26 DIAGNOSIS — I50.22 CHRONIC SYSTOLIC HEART FAILURE (HCC): Primary | ICD-10-CM

## 2024-03-26 DIAGNOSIS — R22.42 MASS OF LEFT THIGH: ICD-10-CM

## 2024-03-26 DIAGNOSIS — L98.9 SKIN LESION: ICD-10-CM

## 2024-03-26 DIAGNOSIS — Z09 HOSPITAL DISCHARGE FOLLOW-UP: Primary | ICD-10-CM

## 2024-03-26 DIAGNOSIS — I48.20 CHRONIC ATRIAL FIBRILLATION (HCC): ICD-10-CM

## 2024-03-26 PROCEDURE — G8399 PT W/DXA RESULTS DOCUMENT: HCPCS | Performed by: CLINICAL NURSE SPECIALIST

## 2024-03-26 PROCEDURE — G8419 CALC BMI OUT NRM PARAM NOF/U: HCPCS | Performed by: STUDENT IN AN ORGANIZED HEALTH CARE EDUCATION/TRAINING PROGRAM

## 2024-03-26 PROCEDURE — 1111F DSCHRG MED/CURRENT MED MERGE: CPT | Performed by: STUDENT IN AN ORGANIZED HEALTH CARE EDUCATION/TRAINING PROGRAM

## 2024-03-26 PROCEDURE — 1123F ACP DISCUSS/DSCN MKR DOCD: CPT | Performed by: STUDENT IN AN ORGANIZED HEALTH CARE EDUCATION/TRAINING PROGRAM

## 2024-03-26 PROCEDURE — G8419 CALC BMI OUT NRM PARAM NOF/U: HCPCS | Performed by: CLINICAL NURSE SPECIALIST

## 2024-03-26 PROCEDURE — 1123F ACP DISCUSS/DSCN MKR DOCD: CPT | Performed by: CLINICAL NURSE SPECIALIST

## 2024-03-26 PROCEDURE — 99215 OFFICE O/P EST HI 40 MIN: CPT | Performed by: STUDENT IN AN ORGANIZED HEALTH CARE EDUCATION/TRAINING PROGRAM

## 2024-03-26 PROCEDURE — 3074F SYST BP LT 130 MM HG: CPT | Performed by: CLINICAL NURSE SPECIALIST

## 2024-03-26 PROCEDURE — G8427 DOCREV CUR MEDS BY ELIG CLIN: HCPCS | Performed by: STUDENT IN AN ORGANIZED HEALTH CARE EDUCATION/TRAINING PROGRAM

## 2024-03-26 PROCEDURE — 99214 OFFICE O/P EST MOD 30 MIN: CPT | Performed by: CLINICAL NURSE SPECIALIST

## 2024-03-26 PROCEDURE — 1090F PRES/ABSN URINE INCON ASSESS: CPT | Performed by: CLINICAL NURSE SPECIALIST

## 2024-03-26 PROCEDURE — G8484 FLU IMMUNIZE NO ADMIN: HCPCS | Performed by: CLINICAL NURSE SPECIALIST

## 2024-03-26 PROCEDURE — 3078F DIAST BP <80 MM HG: CPT | Performed by: STUDENT IN AN ORGANIZED HEALTH CARE EDUCATION/TRAINING PROGRAM

## 2024-03-26 PROCEDURE — G8399 PT W/DXA RESULTS DOCUMENT: HCPCS | Performed by: STUDENT IN AN ORGANIZED HEALTH CARE EDUCATION/TRAINING PROGRAM

## 2024-03-26 PROCEDURE — 3074F SYST BP LT 130 MM HG: CPT | Performed by: STUDENT IN AN ORGANIZED HEALTH CARE EDUCATION/TRAINING PROGRAM

## 2024-03-26 PROCEDURE — 1036F TOBACCO NON-USER: CPT | Performed by: CLINICAL NURSE SPECIALIST

## 2024-03-26 PROCEDURE — G8484 FLU IMMUNIZE NO ADMIN: HCPCS | Performed by: STUDENT IN AN ORGANIZED HEALTH CARE EDUCATION/TRAINING PROGRAM

## 2024-03-26 PROCEDURE — 1036F TOBACCO NON-USER: CPT | Performed by: STUDENT IN AN ORGANIZED HEALTH CARE EDUCATION/TRAINING PROGRAM

## 2024-03-26 PROCEDURE — 1090F PRES/ABSN URINE INCON ASSESS: CPT | Performed by: STUDENT IN AN ORGANIZED HEALTH CARE EDUCATION/TRAINING PROGRAM

## 2024-03-26 PROCEDURE — G8427 DOCREV CUR MEDS BY ELIG CLIN: HCPCS | Performed by: CLINICAL NURSE SPECIALIST

## 2024-03-26 PROCEDURE — 3078F DIAST BP <80 MM HG: CPT | Performed by: CLINICAL NURSE SPECIALIST

## 2024-03-26 NOTE — TELEPHONE ENCOUNTER
Daughter calling [facility] with the name of medication to see if it is something pt can take. [place] DMT. Please call to advise.

## 2024-03-26 NOTE — PATIENT INSTRUCTIONS
Ok to take an extra 10 mg torsemide if cough continues  Call and let me know what medication you were asking about in office visit today  Continue all current medications  RTO in 6 months

## 2024-03-26 NOTE — PROGRESS NOTES
appear moderately thickened. The posterior leaflet   appears severely restricted. Moderate mitral regurgitation is present.   Mitral annular calcification.      Left atrial size is dilated. There is a Watchman device seated with a   feliciano-device leak measured at 2 mm on the anterior side.      There is a small an echogenic mass on the surface of the device, consistent   with thrombus near the coumadin ridge.    [facility] 7/7/2021  Summary   Ejection fraction is visually estimated to be 45-50 %.   Mild thickening of anterior, posterior leaflet of mitral valve.   There is decreased leaflet motion and \"hockey stick\" appearance of the   mitral leaflets. posterior leaflet is immobile. Mean Gradient 4 mmHg.      Moderate mitral regurgitation is present.   There is no evidence of mass or thrombus in the left atrium or appendage.   Watchman in place. No thrombus. <3 mm leak.   The left atrium is dilated.   A PFO is present.   Aortic valve leaflets appear thickened.   Tricuspid aortic valve.   Mild aortic regurgitation is present.   Plaque is noted in the thoracic aorta.   Moderate tricuspid regurgitation. PASP 51 mmHg    [facility] 3/19/2021 Dr Singletary  Artery Findings/Result   LM Normal   LAD 50% ostial, 50% mid instent   Cx 50% mid at OM1 bifurcation, 30% OM1 mid at bifurcation   RI NA   % prox with multiple R-R collaterals   LVEDP 8   LVG NA due to renal failure      Intervention:         None     Post Cath Dx:       Stable CAD  Possible angina from  of RCA - poor candidate for  intervention with renal failure    Echo 4/30/2020   Left ventricular cavity size is normal. Normal left ventricular wall   thickness. Left ventricular function appears to be reduced with an estimated   ejection fraction of 45%. Diffuse hypokinesis.    Echo 7/19/2019   Summary    Left ventricle - normal size, thickness, reduced function with EF of 45%  LV wall motion - diffuse hypokinesis.  Mitral valve - thickened, annular calcification, moderate

## 2024-03-26 NOTE — TELEPHONE ENCOUNTER
She should not use this as it can cause cardiac issues  She could use robitussin for cough only  thanks

## 2024-03-27 ENCOUNTER — TELEPHONE (OUTPATIENT)
Dept: FAMILY MEDICINE CLINIC | Age: 84
End: 2024-03-27

## 2024-03-27 NOTE — TELEPHONE ENCOUNTER
Mrs. Menezes does not have codeine listed as an allergy.  I did see Norco or hydrocodone listed on her medication list.  She should not take the cough syrup with pain pill.  I think it would be fine for her to take the cough syrup sparingly as 1/2 teaspoon up to 4 times daily but avoid taking pain medicine with cough syrup.

## 2024-03-27 NOTE — TELEPHONE ENCOUNTER
Patient's daughter called and states that patient was seen yesterday by MM. She says that patient has had a cough for the last 3 days, but it is worse at night, but has no other sx. She states that the cough wasn't addressed at her appointment due to patient having so many other more pressing issues to discuss. She says she has some leftover cough medicine that was prescribed for her personally, and she would like to know if it would be safe to give it to patient. Cough medication is guafenesin w/ codiene 100-10-5mg.     Please advise

## 2024-03-28 ENCOUNTER — HOSPITAL ENCOUNTER (OUTPATIENT)
Age: 84
Discharge: HOME | End: 2024-03-28
Attending: STUDENT IN AN ORGANIZED HEALTH CARE EDUCATION/TRAINING PROGRAM
Payer: MEDICARE

## 2024-03-28 ENCOUNTER — OFFICE VISIT (OUTPATIENT)
Dept: FAMILY MEDICINE CLINIC | Age: 84
End: 2024-03-28
Payer: MEDICARE

## 2024-03-28 ENCOUNTER — HOSPITAL ENCOUNTER (OUTPATIENT)
Dept: GENERAL RADIOLOGY | Age: 84
Discharge: HOME | End: 2024-03-28
Attending: STUDENT IN AN ORGANIZED HEALTH CARE EDUCATION/TRAINING PROGRAM
Payer: MEDICARE

## 2024-03-28 ENCOUNTER — HOSPITAL ENCOUNTER (OUTPATIENT)
Dept: VASCULAR LAB | Age: 84
Discharge: HOME | End: 2024-03-28
Attending: STUDENT IN AN ORGANIZED HEALTH CARE EDUCATION/TRAINING PROGRAM
Payer: MEDICARE

## 2024-03-28 ENCOUNTER — TELEPHONE (OUTPATIENT)
Dept: CARDIOLOGY CLINIC | Age: 84
End: 2024-03-28

## 2024-03-28 ENCOUNTER — HOSPITAL ENCOUNTER (OUTPATIENT)
Dept: ULTRASOUND IMAGING | Age: 84
Discharge: HOME | End: 2024-03-28
Attending: STUDENT IN AN ORGANIZED HEALTH CARE EDUCATION/TRAINING PROGRAM
Payer: MEDICARE

## 2024-03-28 VITALS
SYSTOLIC BLOOD PRESSURE: 108 MMHG | HEART RATE: 54 BPM | OXYGEN SATURATION: 95 % | BODY MASS INDEX: 16.65 KG/M2 | WEIGHT: 94 LBS | DIASTOLIC BLOOD PRESSURE: 56 MMHG

## 2024-03-28 DIAGNOSIS — R22.42 MASS OF LEFT THIGH: ICD-10-CM
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
## 2024-03-28 DIAGNOSIS — I50.22 CHRONIC SYSTOLIC HEART FAILURE (HCC): ICD-10-CM

## 2024-03-28 DIAGNOSIS — T14.8XXA MULTIPLE SKIN TEARS: ICD-10-CM

## 2024-03-28 DIAGNOSIS — I50.22 CHRONIC SYSTOLIC HEART FAILURE (HCC): Primary | ICD-10-CM

## 2024-03-28 DIAGNOSIS — M79.651 RIGHT THIGH PAIN: ICD-10-CM

## 2024-03-28 DIAGNOSIS — M25.551 RIGHT HIP PAIN: Primary | ICD-10-CM

## 2024-03-28 DIAGNOSIS — M25.551 RIGHT HIP PAIN: ICD-10-CM

## 2024-03-28 LAB
ANION GAP SERPL CALCULATED.3IONS-SCNC: 15 MMOL/L (ref 3–16)
BUN SERPL-MCNC: 34 MG/DL (ref 7–20)
CALCIUM SERPL-MCNC: 9 MG/DL (ref 8.3–10.6)
CHLORIDE SERPL-SCNC: 105 MMOL/L (ref 99–110)
CO2 SERPL-SCNC: 23 MMOL/L (ref 21–32)
CREAT SERPL-MCNC: 1.3 MG/DL (ref 0.6–1.2)
DEPRECATED RDW RBC AUTO: 20.6 % (ref 12.4–15.4)
GFR SERPLBLD CREATININE-BSD FMLA CKD-EPI: 41 ML/MIN/{1.73_M2}
GLUCOSE SERPL-MCNC: 90 MG/DL (ref 70–99)
HCT VFR BLD AUTO: 37.3 % (ref 36–48)
HGB BLD-MCNC: 12.1 G/DL (ref 12–16)
MCH RBC QN AUTO: 28.2 PG (ref 26–34)
MCHC RBC AUTO-ENTMCNC: 32.4 G/DL (ref 31–36)
MCV RBC AUTO: 87.2 FL (ref 80–100)
NT-PROBNP SERPL-MCNC: 5578 PG/ML (ref 0–449)
PLATELET # BLD AUTO: 195 K/UL (ref 135–450)
PMV BLD AUTO: 9.3 FL (ref 5–10.5)
POTASSIUM SERPL-SCNC: 4 MMOL/L (ref 3.5–5.1)
RBC # BLD AUTO: 4.28 M/UL (ref 4–5.2)
SODIUM SERPL-SCNC: 143 MMOL/L (ref 136–145)
WBC # BLD AUTO: 9.4 K/UL (ref 4–11)

## 2024-03-28 PROCEDURE — 83880 ASSAY OF NATRIURETIC PEPTIDE: CPT

## 2024-03-28 PROCEDURE — G8399 PT W/DXA RESULTS DOCUMENT: HCPCS | Performed by: NURSE PRACTITIONER

## 2024-03-28 PROCEDURE — 3074F SYST BP LT 130 MM HG: CPT | Performed by: NURSE PRACTITIONER

## 2024-03-28 PROCEDURE — 93970 EXTREMITY STUDY: CPT

## 2024-03-28 PROCEDURE — G8427 DOCREV CUR MEDS BY ELIG CLIN: HCPCS | Performed by: NURSE PRACTITIONER

## 2024-03-28 PROCEDURE — 73502 X-RAY EXAM HIP UNI 2-3 VIEWS: CPT

## 2024-03-28 PROCEDURE — 85027 COMPLETE CBC AUTOMATED: CPT

## 2024-03-28 PROCEDURE — 1036F TOBACCO NON-USER: CPT | Performed by: NURSE PRACTITIONER

## 2024-03-28 PROCEDURE — 3078F DIAST BP <80 MM HG: CPT | Performed by: NURSE PRACTITIONER

## 2024-03-28 PROCEDURE — G8484 FLU IMMUNIZE NO ADMIN: HCPCS | Performed by: NURSE PRACTITIONER

## 2024-03-28 PROCEDURE — 80048 BASIC METABOLIC PNL TOTAL CA: CPT

## 2024-03-28 PROCEDURE — 99214 OFFICE O/P EST MOD 30 MIN: CPT | Performed by: NURSE PRACTITIONER

## 2024-03-28 PROCEDURE — 1090F PRES/ABSN URINE INCON ASSESS: CPT | Performed by: NURSE PRACTITIONER

## 2024-03-28 PROCEDURE — 71046 X-RAY EXAM CHEST 2 VIEWS: CPT

## 2024-03-28 PROCEDURE — 36415 COLL VENOUS BLD VENIPUNCTURE: CPT

## 2024-03-28 PROCEDURE — G8419 CALC BMI OUT NRM PARAM NOF/U: HCPCS | Performed by: NURSE PRACTITIONER

## 2024-03-28 PROCEDURE — 1123F ACP DISCUSS/DSCN MKR DOCD: CPT | Performed by: NURSE PRACTITIONER

## 2024-03-28 ASSESSMENT — ENCOUNTER SYMPTOMS
DIARRHEA: 0
SHORTNESS OF BREATH: 0
VOMITING: 0
COUGH: 0
NAUSEA: 0

## 2024-03-28 NOTE — PROGRESS NOTES
Staci Menezes  : 1940  Encounter date: 3/28/2024    This is a 83 y.o. female who presents with  Chief Complaint   Patient presents with    Fall     Fell backwards using her walker. Possible that she hit head not sure. Right arm and buttock took brunt of the fall. No bruising on buttock as of right now.      History of present illness:    HPI   Presents to clinic today for follow up on fall.  Fell last night when walking with her walker - backwards.  Fell to her right side - right hip/buttock/right wrist/forearm.  She is having soreness today to right wrist and right hip.  Fall was witnessed - doesn't think she hit her head.  Denies any dizziness, lightheadedness, headaches.  Lives at home with daughter.  Has been taking Norco intermittently - took last night which helped her sleep and control the pain.  Walking today, but not as well - secondary to pain.  She does have skin tears to her right forearm and wrist that her daughter dressed last evening.  Daughter/patient unsure why she keeps falling.   She was seen by Dr. Beck on Monday - 3/26 with concerns for left thigh firm mass - has US scheduled for today. She is also having some right thigh pain today.   She will be completing labs and Chest Xray today for Cardiology to evaluate her CHF.      Allergies   Allergen Reactions    Aspirin Nausea Only    Diltiazem Anaphylaxis    Diltiazem Hcl Anaphylaxis    Lorazepam Other (See Comments)     hallucinations  hallucinations  hallucinations    Sulfa Antibiotics Rash and Hives    Atorvastatin Other (See Comments)     Muscle pains  Muscle pains  Muscle pains    Dabigatran Nausea Only     And indigestion  And indigestion    Aka Pradaxa  And indigestion  And indigestion  And indigestion    Aka Pradaxa  And indigestion  And indigestion  And indigestion    Aka Pradaxa    Dabrafenib     Lisinopril      cough    Mysoline [Primidone]     Nsaids      Other reaction(s): Unknown    Other Other (See Comments)

## 2024-03-28 NOTE — TELEPHONE ENCOUNTER
Pt daughter Laura called and states pt may be in CHF again. Pt also may have a blood clot per daughter. Daughter states [facility] dealt with this the last time and put pt in hospital for CHF treatment. Daughter is asking to speak to [facility] to decide what needs to be done since daughter says she is unsure. Her number is [phone]. Please advise.

## 2024-03-28 NOTE — TELEPHONE ENCOUNTER
Spoke to patient's daughter yomaira no swelling, no sob just coughing and taking cough medicine with codeine patient fell last night and banged up pretty bad, current weight is 92, they will have a ultra sound of her Lt leg to check for a blood clot sight is raised no pain very hard and not warm to the touch. Per [facility] she will order a chest xray.    Spoke to patient's daughter she will get chest x-ray and labs done while she is here for the ultrasound

## 2024-03-28 NOTE — TELEPHONE ENCOUNTER
----- Message from TIFFANIE Duque - CNS sent at 3/28/2024  3:38 PM EDT -----  Chest xray is normal   Spoke to patient's daughter she verbalized understanding.

## 2024-03-29 ENCOUNTER — TELEPHONE (OUTPATIENT)
Dept: CARDIOLOGY CLINIC | Age: 84
End: 2024-03-29

## 2024-03-29 NOTE — TELEPHONE ENCOUNTER
----- Message from Tory Mcgovern, TIFFANIE - CNS sent at 3/29/2024  8:34 AM EDT -----  Fluid level up on her blood work   Ask her daughter to give her an extra 10 mg of torsemide for 3 days  Call us Monday and let us know how she is doing  Thanks    Spoke patient's daughter she verbalized understanding.

## 2024-04-01 NOTE — TELEPHONE ENCOUNTER
Medication Refill    Medication needing refilled:  Tosemide    Dosage of the medication:  20mg    How are you taking this medication (QD, BID, TID, QID, PRN):  TAKE ONE-HALF TABLET BY MOUTH DAILY EXCEPT ON SUNDAY AND AS DIRECTED FOR WEIGHT GAIN     30 or 90 day supply called in: 90    When will you run out of your medication:    Which Pharmacy are we sending the medication to?:    [facility]  [address] [place] [address]  Phone: [phone]

## 2024-04-02 ENCOUNTER — TELEPHONE (OUTPATIENT)
Dept: FAMILY MEDICINE CLINIC | Age: 84
End: 2024-04-02

## 2024-04-02 DIAGNOSIS — M15.9 PRIMARY OSTEOARTHRITIS INVOLVING MULTIPLE JOINTS: ICD-10-CM

## 2024-04-02 RX ORDER — TORSEMIDE 10 MG/1
10 TABLET ORAL DAILY
Qty: 90 TABLET | Refills: 2 | Status: SHIPPED | OUTPATIENT
Start: 2024-04-02

## 2024-04-02 RX ORDER — HYDROCODONE BITARTRATE AND ACETAMINOPHEN 5; 325 MG/1; MG/1
1 TABLET ORAL 4 TIMES DAILY PRN
Qty: 120 TABLET | Refills: 0 | Status: SHIPPED | OUTPATIENT
Start: 2024-04-02 | End: 2024-05-02

## 2024-04-02 NOTE — TELEPHONE ENCOUNTER
Pt daughter callback to speak with Jaylene about test results.      Caryl's callback # [phone]       Please advise...

## 2024-04-02 NOTE — TELEPHONE ENCOUNTER
Pt daughter called asking if refill is going to be done today from yesterday. Pt is down to her last pill. Caryl's number is [phone]. Please advise. Thank you.

## 2024-04-02 NOTE — TELEPHONE ENCOUNTER
Pt daughter called in requesting an antibiotic for her mother (pt Staci) she has a really cough, she has mucus is her eyes. She's had a fever on & off. She stated the pt is weak, she can't get her here for an appointment.     She would like her pain meds refilled. & also some antibiotic for the cough. She coughing but nothing is coming out.    Caryl's [phone]        HYDROcodone-acetaminophen (NORCO) 5-325 MG per tablet 1 tablet       Pharmacy :     [id number] - [address] - P [phone] - F [phone]  [address] [facility] [address]  Phone: [phone]  Fax: [phone]       Please advise...

## 2024-04-05 ENCOUNTER — OFFICE VISIT (OUTPATIENT)
Dept: FAMILY MEDICINE CLINIC | Age: 84
End: 2024-04-05
Payer: MEDICARE

## 2024-04-05 VITALS — TEMPERATURE: 98 F | BODY MASS INDEX: 17.01 KG/M2 | HEART RATE: 73 BPM | OXYGEN SATURATION: 98 % | WEIGHT: 96 LBS

## 2024-04-05 DIAGNOSIS — R05.1 ACUTE COUGH: ICD-10-CM

## 2024-04-05 DIAGNOSIS — I50.43 ACUTE ON CHRONIC COMBINED SYSTOLIC AND DIASTOLIC CHF (CONGESTIVE HEART FAILURE) (HCC): Primary | ICD-10-CM

## 2024-04-05 DIAGNOSIS — I50.43 ACUTE ON CHRONIC COMBINED SYSTOLIC AND DIASTOLIC CHF (CONGESTIVE HEART FAILURE) (HCC): ICD-10-CM

## 2024-04-05 PROCEDURE — 1123F ACP DISCUSS/DSCN MKR DOCD: CPT | Performed by: FAMILY MEDICINE

## 2024-04-05 PROCEDURE — 1036F TOBACCO NON-USER: CPT | Performed by: FAMILY MEDICINE

## 2024-04-05 PROCEDURE — G8419 CALC BMI OUT NRM PARAM NOF/U: HCPCS | Performed by: FAMILY MEDICINE

## 2024-04-05 PROCEDURE — 1090F PRES/ABSN URINE INCON ASSESS: CPT | Performed by: FAMILY MEDICINE

## 2024-04-05 PROCEDURE — 99214 OFFICE O/P EST MOD 30 MIN: CPT | Performed by: FAMILY MEDICINE

## 2024-04-05 PROCEDURE — G8399 PT W/DXA RESULTS DOCUMENT: HCPCS | Performed by: FAMILY MEDICINE

## 2024-04-05 PROCEDURE — G8428 CUR MEDS NOT DOCUMENT: HCPCS | Performed by: FAMILY MEDICINE

## 2024-04-05 NOTE — PROGRESS NOTES
Chief Complaint   Patient presents with    Cough       Has had a cough for a week. Has been taking more diuretics for CHF but cough was getting worse, was running temp 99.4. Always has RN, nothing new or different. No ST. Not sob.     Reviewed ED notes and workup, elevated BNP, pulm edema and effusion on CXR, normal cbc, bmp slightly worse renal fxn.    Tries to do daily weights, up and down but no sig change, no swelling in ankles.         Vitals:    04/05/24 1529   Pulse: 73   Temp: 98 °F (36.7 °C)   SpO2: 98%   Weight: 43.5 kg (96 lb)     Wt Readings from Last 3 Encounters:   04/05/24 43.5 kg (96 lb)   03/28/24 42.6 kg (94 lb)   03/26/24 42.6 kg (94 lb)     Body mass index is 17.01 kg/m².    Alert and oriented x 4 NAD, affect appropriate and slender, well hydrated, well developed.  Left TM nl, canal nl and pinna nl  Right TM nl, canal nl and pinna nl  No nodes neck  OP no erythema, no exudate, no swelling  Lung slight decreased left base o/w clear  CV RRR no M  No edema    ASSESSMENT AND PLAN:       Staci was seen today for cough.    Diagnoses and all orders for this visit:    Acute on chronic combined systolic and diastolic CHF (congestive heart failure) (HCC)  -     Brain Natriuretic Peptide; Future  -     Basic Metabolic Panel; Future    Acute cough      Sx likely due to CHF and note made of pleural effusion on CXR last weekend  Daughter states cough better today  Recheck labs and monitor

## 2024-04-06 LAB
ANION GAP SERPL CALCULATED.3IONS-SCNC: 16 MMOL/L (ref 3–16)
BUN SERPL-MCNC: 48 MG/DL (ref 7–20)
CALCIUM SERPL-MCNC: 9 MG/DL (ref 8.3–10.6)
CHLORIDE SERPL-SCNC: 105 MMOL/L (ref 99–110)
CO2 SERPL-SCNC: 23 MMOL/L (ref 21–32)
CREAT SERPL-MCNC: 1.5 MG/DL (ref 0.6–1.2)
GFR SERPLBLD CREATININE-BSD FMLA CKD-EPI: 34 ML/MIN/{1.73_M2}
GLUCOSE SERPL-MCNC: 100 MG/DL (ref 70–99)
NT-PROBNP SERPL-MCNC: 7380 PG/ML (ref 0–449)
POTASSIUM SERPL-SCNC: 3.6 MMOL/L (ref 3.5–5.1)
SODIUM SERPL-SCNC: 144 MMOL/L (ref 136–145)

## 2024-04-07 ENCOUNTER — TELEPHONE (OUTPATIENT)
Dept: FAMILY MEDICINE CLINIC | Age: 84
End: 2024-04-07

## 2024-04-08 ENCOUNTER — TELEPHONE (OUTPATIENT)
Dept: FAMILY MEDICINE CLINIC | Age: 84
End: 2024-04-08

## 2024-04-08 NOTE — TELEPHONE ENCOUNTER
Patient's daughter Caryl Mills called and states she received a letter calling her for jury duty, but she says she her patient's primary caregiver. She would like to know if provider could write a letter telling the courts that she can't serve jury duty for this reason.    Please advise

## 2024-04-08 NOTE — TELEPHONE ENCOUNTER
Tory,     I saw Staci on Friday, she was c/o dry cough. Had been in ED at [facility] a week ago and had pulm edema and pleural effusion on CXR. Her cough seemed more CHF than infxn but her daughter said she seemed better so I  just repeated her labs (in chart) but didn't do anything with her diuretics. Her BNP is up more than before but her kidneys also more stressed so not sure if you want to increase her diuretics again or not. She looked weak but O2 ok and daughter said weights up and down but overall stable.     Thanks!    Ketty Mariscal

## 2024-04-08 NOTE — TELEPHONE ENCOUNTER
Spoke to patient's daughter cough is better mostly at night or early morning. No sob,  or swelling, no weight today but not weight increase, torsemide 10 mg daily

## 2024-04-29 RX ORDER — CARVEDILOL 3.12 MG/1
3.12 TABLET ORAL 2 TIMES DAILY WITH MEALS
Qty: 60 TABLET | Refills: 1 | Status: SHIPPED | OUTPATIENT
Start: 2024-04-29

## 2024-05-09 ENCOUNTER — OFFICE VISIT (OUTPATIENT)
Dept: FAMILY MEDICINE CLINIC | Age: 84
End: 2024-05-09
Payer: MEDICARE

## 2024-05-09 VITALS
WEIGHT: 96.13 LBS | SYSTOLIC BLOOD PRESSURE: 100 MMHG | BODY MASS INDEX: 17.03 KG/M2 | RESPIRATION RATE: 12 BRPM | HEART RATE: 94 BPM | DIASTOLIC BLOOD PRESSURE: 68 MMHG | TEMPERATURE: 97.4 F

## 2024-05-09 DIAGNOSIS — I70.213 ATHEROSCLEROSIS OF NATIVE ARTERIES OF EXTREMITIES WITH INTERMITTENT CLAUDICATION, BILATERAL LEGS (HCC): ICD-10-CM

## 2024-05-09 DIAGNOSIS — J31.0 GUSTATORY RHINITIS: ICD-10-CM

## 2024-05-09 DIAGNOSIS — D46.9 MYELODYSPLASTIC SYNDROME (HCC): ICD-10-CM

## 2024-05-09 DIAGNOSIS — I50.42 CHRONIC COMBINED SYSTOLIC (CONGESTIVE) AND DIASTOLIC (CONGESTIVE) HEART FAILURE (HCC): ICD-10-CM

## 2024-05-09 DIAGNOSIS — N18.32 CHRONIC RENAL FAILURE, STAGE 3B (HCC): ICD-10-CM

## 2024-05-09 DIAGNOSIS — M15.9 PRIMARY OSTEOARTHRITIS INVOLVING MULTIPLE JOINTS: ICD-10-CM

## 2024-05-09 DIAGNOSIS — L82.1 SEBORRHEIC KERATOSIS: ICD-10-CM

## 2024-05-09 DIAGNOSIS — J44.9 COPD, MODERATE (HCC): ICD-10-CM

## 2024-05-09 DIAGNOSIS — E43 SEVERE MALNUTRITION (HCC): Primary | ICD-10-CM

## 2024-05-09 PROCEDURE — 1036F TOBACCO NON-USER: CPT | Performed by: FAMILY MEDICINE

## 2024-05-09 PROCEDURE — 3074F SYST BP LT 130 MM HG: CPT | Performed by: FAMILY MEDICINE

## 2024-05-09 PROCEDURE — G8419 CALC BMI OUT NRM PARAM NOF/U: HCPCS | Performed by: FAMILY MEDICINE

## 2024-05-09 PROCEDURE — 1123F ACP DISCUSS/DSCN MKR DOCD: CPT | Performed by: FAMILY MEDICINE

## 2024-05-09 PROCEDURE — 1090F PRES/ABSN URINE INCON ASSESS: CPT | Performed by: FAMILY MEDICINE

## 2024-05-09 PROCEDURE — 99214 OFFICE O/P EST MOD 30 MIN: CPT | Performed by: FAMILY MEDICINE

## 2024-05-09 PROCEDURE — 3023F SPIROM DOC REV: CPT | Performed by: FAMILY MEDICINE

## 2024-05-09 PROCEDURE — 3078F DIAST BP <80 MM HG: CPT | Performed by: FAMILY MEDICINE

## 2024-05-09 PROCEDURE — G8399 PT W/DXA RESULTS DOCUMENT: HCPCS | Performed by: FAMILY MEDICINE

## 2024-05-09 PROCEDURE — G8427 DOCREV CUR MEDS BY ELIG CLIN: HCPCS | Performed by: FAMILY MEDICINE

## 2024-05-09 RX ORDER — TOPIRAMATE 50 MG/1
TABLET, FILM COATED ORAL
Qty: 360 TABLET | Refills: 1 | Status: SHIPPED | OUTPATIENT
Start: 2024-05-09 | End: 2024-05-14

## 2024-05-09 RX ORDER — HYDROCODONE BITARTRATE AND ACETAMINOPHEN 5; 325 MG/1; MG/1
1 TABLET ORAL 4 TIMES DAILY PRN
Qty: 120 TABLET | Refills: 0 | Status: SHIPPED | OUTPATIENT
Start: 2024-05-09 | End: 2024-06-08

## 2024-05-09 RX ORDER — CARVEDILOL 3.12 MG/1
3.12 TABLET ORAL 2 TIMES DAILY WITH MEALS
Qty: 180 TABLET | Refills: 1 | Status: SHIPPED | OUTPATIENT
Start: 2024-05-09

## 2024-05-09 RX ORDER — TORSEMIDE 10 MG/1
10 TABLET ORAL DAILY
Qty: 90 TABLET | Refills: 2 | Status: SHIPPED | OUTPATIENT
Start: 2024-05-09

## 2024-05-09 RX ORDER — IPRATROPIUM BROMIDE 21 UG/1
1 SPRAY, METERED NASAL 3 TIMES DAILY
Qty: 30 ML | Refills: 5 | Status: SHIPPED | OUTPATIENT
Start: 2024-05-09

## 2024-05-09 ASSESSMENT — PATIENT HEALTH QUESTIONNAIRE - PHQ9: DEPRESSION UNABLE TO ASSESS: FUNCTIONAL CAPACITY MOTIVATION LIMITS ACCURACY

## 2024-05-09 NOTE — PROGRESS NOTES
Subjective   Patient ID: Staci Menezes is a 83 y.o. female.  CC: Patient presents for re-evaluation of chronic health problems including malnutrition, chronic renal failure, chronic diastolic heart failure, myelodysplastic syndrome and COPD..    HPI pt is here for a follow up with her daughter.  Patient last month had a persistent cough secondary congestive heart failure.  Medications were adjusted and she feels she still has a cough but is not as severe.  She still not eating well and has lost additional weight.  She has decided she does not want additional treatment for the myelodysplastic syndrome.  Her last treatment was in December.  She is also concerned about a spot on her buttocks area and she states he is just uncomfortable to sit in the spot.  She spends most of her day sitting in a chair.  She continues with cardiology and nephrology care.  She typically takes 10 mg of Demadex daily but about once a week daughter has given additional 10 mg of Demadex as her weight has increased.  She denies any leg swelling associated with this.  She denies any claudication symptoms as she is not particularly active at this time.  She has good family support.Patient is very compliant with medications with no adverse reactions.    Review of Systems     Patient Active Problem List   Diagnosis    Essential hypertension, benign    Mixed hyperlipidemia    Chronic gouty arthropathy    Acquired hypothyroidism    Non-rheumatic mitral regurgitation    COPD (chronic obstructive pulmonary disease) (HCC)    Restrictive lung disease    Sick sinus syndrome (HCC)    Allergic rhinitis    Fibrocystic breast    Cerebrovascular accident (CVA) (HCC)    Pacemaker    Primary osteoarthritis involving multiple joints    Vitamin D deficiency    Tremor    Chronic total occlusion of native coronary artery    Age related osteoporosis    Chronic combined systolic (congestive) and diastolic (congestive) heart failure (HCC)    Chronic kidney

## 2024-05-09 NOTE — PROGRESS NOTES
Problem: Pain - Adult  Goal: Verbalizes/displays adequate comfort level or baseline comfort level  5/9/2024 1715 by Denise Curry RN  Outcome: Met  5/9/2024 0929 by Denise Curry RN  Outcome: Progressing     Problem: Safety - Adult  Goal: Free from fall injury  5/9/2024 1715 by Denise Curry RN  Outcome: Met  5/9/2024 0929 by Denise Curry RN  Outcome: Progressing     Problem: Discharge Planning  Goal: Discharge to home or other facility with appropriate resources  5/9/2024 1715 by Denise Curry RN  Outcome: Met  5/9/2024 0929 by Denise Curry RN  Outcome: Progressing     Problem: Chronic Conditions and Co-morbidities  Goal: Patient's chronic conditions and co-morbidity symptoms are monitored and maintained or improved  5/9/2024 1715 by Denise Curry RN  Outcome: Met  5/9/2024 0929 by Denise Curry RN  Outcome: Progressing     Problem: Pain  Goal: Takes deep breaths with improved pain control throughout the shift  5/9/2024 1715 by Denise Curry RN  Outcome: Met  5/9/2024 0929 by Denise Curry RN  Outcome: Progressing  Goal: Turns in bed with improved pain control throughout the shift  5/9/2024 1715 by Denise Curry RN  Outcome: Met  5/9/2024 0929 by Denise Curry RN  Outcome: Progressing  Goal: Walks with improved pain control throughout the shift  5/9/2024 1715 by Denise Curry RN  Outcome: Met  5/9/2024 0929 by Denise Curry RN  Outcome: Progressing  Goal: Performs ADL's with improved pain control throughout shift  5/9/2024 1715 by Denise Curry RN  Outcome: Met  5/9/2024 0929 by Denise Curry RN  Outcome: Progressing  Goal: Participates in PT with improved pain control throughout the shift  5/9/2024 1715 by Denise Curry RN  Outcome: Met  5/9/2024 0929 by Denise Curry RN  Outcome: Progressing  Goal: Free from opioid side effects throughout the shift  5/9/2024 1715 by Denise Curry RN  Outcome: Met  5/9/2024 0929 by Denise Curry RN  Outcome: Progressing  Goal: Free from acute confusion  Dr. Gutierrez Kid notified of elevated coags and wants them repeated dos. Jenna at Dr. Chelo Lutz' office informed of this plan. No further orders received. related to pain meds throughout the shift  5/9/2024 1715 by Denise Curry, RN  Outcome: Met  5/9/2024 0905 by Denise Curry, RN  Outcome: Progressing

## 2024-05-13 PROBLEM — D50.9 IRON DEFICIENCY ANEMIA: Status: RESOLVED | Noted: 2020-04-30 | Resolved: 2024-05-13

## 2024-05-13 PROBLEM — R07.9 CHEST PAIN: Status: RESOLVED | Noted: 2024-03-16 | Resolved: 2024-05-13

## 2024-05-13 PROBLEM — N18.9 CHRONIC KIDNEY DISEASE: Status: RESOLVED | Noted: 2019-07-22 | Resolved: 2024-05-13

## 2024-05-13 NOTE — TELEPHONE ENCOUNTER
topiramate (TOPAMAX) 50 MG tablet     [facility] [id number] - [place], [place] - [address] - P [phone] - F [phone] [[id number]]

## 2024-05-14 RX ORDER — TOPIRAMATE 50 MG/1
TABLET, FILM COATED ORAL
Qty: 120 TABLET | Refills: 0 | Status: SHIPPED | OUTPATIENT
Start: 2024-05-14

## 2024-05-23 ENCOUNTER — TELEPHONE (OUTPATIENT)
Dept: FAMILY MEDICINE CLINIC | Age: 84
End: 2024-05-23

## 2024-05-23 DIAGNOSIS — K59.1 FUNCTIONAL DIARRHEA: Primary | ICD-10-CM

## 2024-05-23 RX ORDER — DIPHENOXYLATE HYDROCHLORIDE AND ATROPINE SULFATE 2.5; .025 MG/1; MG/1
1 TABLET ORAL 4 TIMES DAILY PRN
Qty: 40 TABLET | Refills: 0 | Status: SHIPPED | OUTPATIENT
Start: 2024-05-23 | End: 2024-06-22

## 2024-05-23 NOTE — TELEPHONE ENCOUNTER
Patient was in a couple weeks ago, was told to take immodium for diarrhea.  She has been taking this daily and it is  not helping at all.  She would like to know if she can get something different.

## 2024-06-03 ENCOUNTER — TELEPHONE (OUTPATIENT)
Dept: FAMILY MEDICINE CLINIC | Age: 84
End: 2024-06-03

## 2024-06-04 ENCOUNTER — OFFICE VISIT (OUTPATIENT)
Dept: FAMILY MEDICINE CLINIC | Age: 84
End: 2024-06-04
Payer: MEDICARE

## 2024-06-04 VITALS — OXYGEN SATURATION: 95 % | TEMPERATURE: 97 F | HEART RATE: 70 BPM

## 2024-06-04 DIAGNOSIS — R31.9 URINARY TRACT INFECTION WITH HEMATURIA, SITE UNSPECIFIED: ICD-10-CM

## 2024-06-04 DIAGNOSIS — N39.0 URINARY TRACT INFECTION WITH HEMATURIA, SITE UNSPECIFIED: ICD-10-CM

## 2024-06-04 DIAGNOSIS — J40 BRONCHITIS: Primary | ICD-10-CM

## 2024-06-04 LAB
APPEARANCE FLUID: CLEAR
BILIRUBIN, POC: NORMAL
BLOOD URINE, POC: NORMAL
CLARITY, POC: CLEAR
COLOR, POC: YELLOW
GLUCOSE URINE, POC: NORMAL
KETONES, POC: NORMAL
LEUKOCYTE EST, POC: NORMAL
NITRITE, POC: NORMAL
PH, POC: 5.5
PROTEIN, POC: NORMAL
SPECIFIC GRAVITY, POC: 1.02
UROBILINOGEN, POC: 0.2

## 2024-06-04 PROCEDURE — 1036F TOBACCO NON-USER: CPT | Performed by: FAMILY MEDICINE

## 2024-06-04 PROCEDURE — G8419 CALC BMI OUT NRM PARAM NOF/U: HCPCS | Performed by: FAMILY MEDICINE

## 2024-06-04 PROCEDURE — 81002 URINALYSIS NONAUTO W/O SCOPE: CPT | Performed by: FAMILY MEDICINE

## 2024-06-04 PROCEDURE — 99214 OFFICE O/P EST MOD 30 MIN: CPT | Performed by: FAMILY MEDICINE

## 2024-06-04 PROCEDURE — 1123F ACP DISCUSS/DSCN MKR DOCD: CPT | Performed by: FAMILY MEDICINE

## 2024-06-04 PROCEDURE — G8428 CUR MEDS NOT DOCUMENT: HCPCS | Performed by: FAMILY MEDICINE

## 2024-06-04 PROCEDURE — G8399 PT W/DXA RESULTS DOCUMENT: HCPCS | Performed by: FAMILY MEDICINE

## 2024-06-04 PROCEDURE — 1090F PRES/ABSN URINE INCON ASSESS: CPT | Performed by: FAMILY MEDICINE

## 2024-06-04 RX ORDER — AMOXICILLIN 500 MG/1
1000 TABLET, FILM COATED ORAL 2 TIMES DAILY
Qty: 40 TABLET | Refills: 0 | Status: SHIPPED | OUTPATIENT
Start: 2024-06-04 | End: 2024-06-14

## 2024-06-04 NOTE — PROGRESS NOTES
TempSrc: Skin   SpO2: 95%     Wt Readings from Last 3 Encounters:   05/09/24 43.6 kg (96 lb 2 oz)   04/05/24 43.5 kg (96 lb)   03/28/24 42.6 kg (94 lb)     There is no height or weight on file to calculate BMI.    Alert and oriented x 4 NAD, thin and frail, no acute distress, poor hydrated, well developed.  Left TM unable to be visualized, canal impacted and pinna nl  Right TM nml, canal some cerumen,  and pinna nl  No nodes neck  Nares red and congested, postnasal drainage  OP mild erythema with yellow-green postnasal drainage, no exudate, no swelling  Lung with scattered ronchi, some wheezes; weak cough on exertion  CV RRR no M          ASSESSMENT AND PLAN:     Staci was seen today for cough.    Diagnoses and all orders for this visit:    Bronchitis  Start course of Amoxicillin; see if sx improve   Cough most likely due to allergies causing postnasal drip and nasal irritation; try OTC Courcidin or Mucinex PRN & add anti-allergy medication like Zyrtec or Claritin daily.    Dysuria  UTI  Obtain UA & culture to determine cause and treat; amoxicillin may help too. UA showed +WBC and blood. Sending for culture.    Other orders  -     amoxicillin (AMOXIL) 500 MG tablet; Take 2 tablets by mouth 2 times daily for 10 days      Return if symptoms worsen or fail to improve. If severely short of breath, present to ED.    Note per Shaila Gentile,  MS3  student with corrections and edits per Tricia Mariscal MD.  I agree with entirety of note and was present and performed history and physical.  I also confirm that the note above accurately reflects all work, treatment, procedures, and medical decision making performed by me, Tricia Mariscal MD.

## 2024-06-07 ENCOUNTER — TELEPHONE (OUTPATIENT)
Dept: FAMILY MEDICINE CLINIC | Age: 84
End: 2024-06-07

## 2024-06-07 LAB
BACTERIA UR CULT: ABNORMAL
BACTERIA UR CULT: ABNORMAL
ORGANISM: ABNORMAL
ORGANISM: ABNORMAL

## 2024-06-07 RX ORDER — AMOXICILLIN AND CLAVULANATE POTASSIUM 500; 125 MG/1; MG/1
1 TABLET, FILM COATED ORAL 3 TIMES DAILY
Qty: 15 TABLET | Refills: 0 | Status: SHIPPED | OUTPATIENT
Start: 2024-06-07 | End: 2024-06-12

## 2024-06-07 NOTE — TELEPHONE ENCOUNTER
----- Message from Tricia Mariscal MD sent at 6/7/2024 12:16 PM EDT -----  UA shows not sensitive to abx we prescribed  Change to augmentin 500mg TID x 5 days

## 2024-06-19 DIAGNOSIS — M15.9 PRIMARY OSTEOARTHRITIS INVOLVING MULTIPLE JOINTS: ICD-10-CM

## 2024-06-19 RX ORDER — HYDROCODONE BITARTRATE AND ACETAMINOPHEN 5; 325 MG/1; MG/1
1 TABLET ORAL 4 TIMES DAILY PRN
Qty: 120 TABLET | Refills: 0 | Status: SHIPPED | OUTPATIENT
Start: 2024-06-19 | End: 2024-07-19

## 2024-06-19 NOTE — TELEPHONE ENCOUNTER
Medication:   Requested Prescriptions     Pending Prescriptions Disp Refills    HYDROcodone-acetaminophen (NORCO) 5-325 MG per tablet 120 tablet 0     Sig: Take 1 tablet by mouth 4 times daily as needed for Pain for up to 30 days.      Last Filled:  5/9    Patient Phone Number: [phone] (home)     Last appt: 6/4/2024   Next appt: 8/29/2024    Last OARRS:       6/1/2021     4:12 PM   RX Monitoring   Periodic Controlled Substance Monitoring Possible medication side effects, risk of tolerance/dependence & alternative treatments discussed.     PDMP Monitoring:    Last PDMP [place] as Reviewed ([place]):  Review User Review Instant Review Result   MOHINDER GOLDSTEIN 8/4/2023  1:05 PM Reviewed PDMP [1]     Preferred Pharmacy:   [facility] [id number] - [place], [place] - [address] - P [phone] - F [phone]  [address] [facility] [address]  Phone: [phone] Fax: [phone]    [facility] - [place] - [address] - P [phone] - F [phone]  [address] [facility] [place] [address]  Phone: [phone] Fax: [phone]

## 2024-07-01 RX ORDER — ERGOCALCIFEROL 1.25 MG/1
CAPSULE ORAL
Qty: 12 CAPSULE | Refills: 1 | Status: SHIPPED | OUTPATIENT
Start: 2024-07-01

## 2024-07-09 PROBLEM — Z87.891 FORMER SMOKER: Status: RESOLVED | Noted: 2023-03-16 | Resolved: 2024-07-09

## 2024-07-09 PROBLEM — R49.0 DYSPHONIA: Status: RESOLVED | Noted: 2022-10-29 | Resolved: 2024-07-09

## 2024-07-09 PROBLEM — R06.02 SOB (SHORTNESS OF BREATH): Status: RESOLVED | Noted: 2023-04-30 | Resolved: 2024-07-09

## 2024-07-09 PROBLEM — E43 SEVERE MALNUTRITION (HCC): Status: RESOLVED | Noted: 2022-11-29 | Resolved: 2024-07-09

## 2024-07-30 DIAGNOSIS — M15.9 PRIMARY OSTEOARTHRITIS INVOLVING MULTIPLE JOINTS: ICD-10-CM

## 2024-07-30 RX ORDER — HYDROCODONE BITARTRATE AND ACETAMINOPHEN 5; 325 MG/1; MG/1
1 TABLET ORAL 4 TIMES DAILY PRN
Qty: 120 TABLET | Refills: 0 | Status: SHIPPED | OUTPATIENT
Start: 2024-07-30 | End: 2024-08-29

## 2024-07-30 NOTE — TELEPHONE ENCOUNTER
HYDROcodone-acetaminophen (NORCO) 5-325 MG per tablet [[id number]]  DISCONTINUED     [facility] [id number] - [place], [place] - [address] - P [phone] - F [phone]  [address] [facility] [address]  Phone: [phone]  Fax: [phone]

## 2024-07-30 NOTE — TELEPHONE ENCOUNTER
Medication:   Requested Prescriptions     Pending Prescriptions Disp Refills    HYDROcodone-acetaminophen (NORCO) 5-325 MG per tablet 120 tablet 0     Sig: Take 1 tablet by mouth 4 times daily as needed for Pain for up to 30 days.      Last Filled:  6/19/24    Patient Phone Number: [phone] (home)     Last appt: 6/4/2024   Next appt: 8/29/2024    Last OARRS:       6/1/2021     4:12 PM   RX Monitoring   Periodic Controlled Substance Monitoring Possible medication side effects, risk of tolerance/dependence & alternative treatments discussed.     PDMP Monitoring:    Last PDMP [place] as Reviewed ([place]):  Review User Review Instant Review Result   MOHINDER GOLDSTEIN 8/4/2023  1:05 PM Reviewed PDMP [1]     Preferred Pharmacy:     [facility] [id number] - [place], [place] - [address] - P [phone] - F [phone]  [address] [facility] [address]  Phone: [phone] Fax: [phone]

## 2024-08-12 RX ORDER — ROSUVASTATIN CALCIUM 5 MG/1
TABLET, COATED ORAL
Qty: 90 TABLET | Refills: 2 | Status: SHIPPED | OUTPATIENT
Start: 2024-08-12

## 2024-08-12 NOTE — TELEPHONE ENCOUNTER
Received refill request for  rosuvastatin (CRESTOR) 5 MG tablet  from [facility] pharmacy.     Last OV: 8/8/24    Next OV: 10/22/24    Last Labs:     Last Filled: 6/14/23

## 2024-08-25 PROBLEM — I50.23 ACUTE ON CHRONIC SYSTOLIC CHF (CONGESTIVE HEART FAILURE) (HCC): Status: RESOLVED | Noted: 2023-11-07 | Resolved: 2024-08-25

## 2024-08-29 ENCOUNTER — HOSPITAL ENCOUNTER (OUTPATIENT)
Dept: GENERAL RADIOLOGY | Age: 84
Discharge: HOME | End: 2024-08-29
Payer: MEDICARE

## 2024-08-29 ENCOUNTER — HOSPITAL ENCOUNTER (OUTPATIENT)
Age: 84
Discharge: HOME | End: 2024-08-29
Payer: MEDICARE

## 2024-08-29 ENCOUNTER — OFFICE VISIT (OUTPATIENT)
Dept: FAMILY MEDICINE CLINIC | Age: 84
End: 2024-08-29
Payer: MEDICARE

## 2024-08-29 VITALS
OXYGEN SATURATION: 96 % | BODY MASS INDEX: 16.34 KG/M2 | RESPIRATION RATE: 12 BRPM | DIASTOLIC BLOOD PRESSURE: 60 MMHG | WEIGHT: 92.25 LBS | TEMPERATURE: 97.3 F | HEART RATE: 62 BPM | SYSTOLIC BLOOD PRESSURE: 130 MMHG

## 2024-08-29 DIAGNOSIS — D46.9 MYELODYSPLASTIC SYNDROME (HCC): ICD-10-CM

## 2024-08-29 DIAGNOSIS — R29.6 RECURRENT FALLS WHILE WALKING: ICD-10-CM

## 2024-08-29 DIAGNOSIS — M25.551 HIP PAIN, ACUTE, RIGHT: ICD-10-CM

## 2024-08-29 DIAGNOSIS — M15.9 PRIMARY OSTEOARTHRITIS INVOLVING MULTIPLE JOINTS: ICD-10-CM

## 2024-08-29 DIAGNOSIS — M25.551 HIP PAIN, ACUTE, RIGHT: Primary | ICD-10-CM

## 2024-08-29 DIAGNOSIS — F01.B0 MODERATE VASCULAR DEMENTIA WITHOUT BEHAVIORAL DISTURBANCE, PSYCHOTIC DISTURBANCE, MOOD DISTURBANCE, OR ANXIETY (HCC): ICD-10-CM

## 2024-08-29 PROCEDURE — G8427 DOCREV CUR MEDS BY ELIG CLIN: HCPCS | Performed by: FAMILY MEDICINE

## 2024-08-29 PROCEDURE — 1090F PRES/ABSN URINE INCON ASSESS: CPT | Performed by: FAMILY MEDICINE

## 2024-08-29 PROCEDURE — G8419 CALC BMI OUT NRM PARAM NOF/U: HCPCS | Performed by: FAMILY MEDICINE

## 2024-08-29 PROCEDURE — 73503 X-RAY EXAM HIP UNI 4/> VIEWS: CPT

## 2024-08-29 PROCEDURE — 1036F TOBACCO NON-USER: CPT | Performed by: FAMILY MEDICINE

## 2024-08-29 PROCEDURE — G8399 PT W/DXA RESULTS DOCUMENT: HCPCS | Performed by: FAMILY MEDICINE

## 2024-08-29 PROCEDURE — 99214 OFFICE O/P EST MOD 30 MIN: CPT | Performed by: FAMILY MEDICINE

## 2024-08-29 PROCEDURE — 3075F SYST BP GE 130 - 139MM HG: CPT | Performed by: FAMILY MEDICINE

## 2024-08-29 PROCEDURE — 3078F DIAST BP <80 MM HG: CPT | Performed by: FAMILY MEDICINE

## 2024-08-29 PROCEDURE — 1123F ACP DISCUSS/DSCN MKR DOCD: CPT | Performed by: FAMILY MEDICINE

## 2024-08-29 RX ORDER — LEVOTHYROXINE SODIUM 100 UG/1
100 TABLET ORAL DAILY
Qty: 90 TABLET | Refills: 1 | Status: SHIPPED | OUTPATIENT
Start: 2024-08-29

## 2024-08-29 RX ORDER — HYDROCODONE BITARTRATE AND ACETAMINOPHEN 5; 325 MG/1; MG/1
1 TABLET ORAL 4 TIMES DAILY PRN
Qty: 120 TABLET | Refills: 0 | Status: SHIPPED | OUTPATIENT
Start: 2024-08-29 | End: 2024-09-28

## 2024-08-29 RX ORDER — TOPIRAMATE 50 MG/1
TABLET, FILM COATED ORAL
Qty: 120 TABLET | Refills: 1 | Status: SHIPPED | OUTPATIENT
Start: 2024-08-29

## 2024-08-29 RX ORDER — LOSARTAN POTASSIUM 25 MG/1
12.5 TABLET ORAL DAILY
Qty: 90 TABLET | Refills: 1 | Status: SHIPPED | OUTPATIENT
Start: 2024-08-29

## 2024-08-29 SDOH — ECONOMIC STABILITY: FOOD INSECURITY: WITHIN THE PAST 12 MONTHS, YOU WORRIED THAT YOUR FOOD WOULD RUN OUT BEFORE YOU GOT MONEY TO BUY MORE.: NEVER TRUE

## 2024-08-29 SDOH — ECONOMIC STABILITY: FOOD INSECURITY: WITHIN THE PAST 12 MONTHS, THE FOOD YOU BOUGHT JUST DIDN'T LAST AND YOU DIDN'T HAVE MONEY TO GET MORE.: NEVER TRUE

## 2024-08-29 SDOH — ECONOMIC STABILITY: INCOME INSECURITY: HOW HARD IS IT FOR YOU TO PAY FOR THE VERY BASICS LIKE FOOD, HOUSING, MEDICAL CARE, AND HEATING?: NOT HARD AT ALL

## 2024-08-29 ASSESSMENT — PATIENT HEALTH QUESTIONNAIRE - PHQ9: DEPRESSION UNABLE TO ASSESS: FUNCTIONAL CAPACITY MOTIVATION LIMITS ACCURACY

## 2024-08-29 NOTE — PROGRESS NOTES
Subjective   Patient ID: Staci Menezes is a 83 y.o. female.  CC: Patient presents for hospital follow-up.    HPI Pt is here for a follow up with her daughter and states she was in the ED after a fall and braking her femur again. Pt has been in rehab and got about 3 weeks ago. Daughter states pt has not been doing very well since then, keeps falling and memory is getting worse, lots of confusion.  Patient was hospitalized July 2 through July 6 at [facility] as she fell and sustained a subdural hematoma and right femur fracture.  She underwent fracture repair of the right hip and did not require intervention regards to the subdural hematoma.  Patient was then discharged to [facility] for rehabilitation.  She has returned home and has a lot of family assistance.  Apparently she got up in melanite to go to the bathroom and started feeling weak and fell onto her recent right hip repair.  Patient is 7+ weeks out from hip replacement.  She has been able to use her walker but she is very uncomfortable with walking.  Daughter is also concerned that her memory is just slowly worsening.  Her appetite is decreased and she is lost more weight.  She denies any abdominal pain.    Review of Systems     Patient Active Problem List   Diagnosis    Essential hypertension, benign    Mixed hyperlipidemia    Chronic gouty arthropathy    Acquired hypothyroidism    Non-rheumatic mitral regurgitation    COPD (chronic obstructive pulmonary disease) ([facility])    Restrictive lung disease    Sick sinus syndrome ([facility])    Allergic rhinitis    Fibrocystic breast    Cerebrovascular accident (CVA) ([facility])    Pacemaker    Primary osteoarthritis involving multiple joints    Vitamin D deficiency    Tremor    Chronic total occlusion of native coronary artery    Age related osteoporosis    Chronic combined systolic (congestive) and diastolic (congestive) heart failure ([facility])    PAD (peripheral artery disease) ([facility])    Atherosclerosis  hallucinations  hallucinations  hallucinations    Sulfa Antibiotics Rash and Hives    Atorvastatin Other (See Comments)     Muscle pains  Muscle pains  Muscle pains    Dabigatran Nausea Only     And indigestion  And indigestion    Aka Pradaxa  And indigestion  And indigestion  And indigestion    Aka Pradaxa  And indigestion  And indigestion  And indigestion    Aka Pradaxa    Dabrafenib     Lisinopril      cough    Mysoline [Primidone]     Nsaids      Other reaction(s): Unknown    Other Other (See Comments)     Nitroglycerin patches causes severe headaches    Primidone      Other reaction(s): Unknown       Social History     Tobacco Use    Smoking status: Former     Current packs/day: 0.00     Average packs/day: 1 pack/day for 28.0 years (28.0 ttl pk-yrs)     Types: Cigarettes     Start date: 1984     Quit date: 2012     Years since quittin.6    Smokeless tobacco: Never   Substance Use Topics    Alcohol use: Yes     Comment: occasionally       /60 (Site: Left Upper Arm, Position: Sitting, Cuff Size: Medium Adult)   Pulse 62   Temp 97.3 °F (36.3 °C) (Infrared)   Resp 12   Wt 41.8 kg (92 lb 4 oz)   SpO2 96%   BMI 16.34 kg/m²        Objective   Physical Exam  Constitutional:       General: She is not in acute distress.     Appearance: She is underweight.   Neck:      Vascular: No carotid bruit.   Cardiovascular:      Rate and Rhythm: Normal rate and regular rhythm.      Pulses:           Dorsalis pedis pulses are 2+ on the right side and 2+ on the left side.        Posterior tibial pulses are 2+ on the right side and 2+ on the left side.      Heart sounds: Murmur heard.      Systolic (right sternal border) murmur is present.      No friction rub. No gallop.   Pulmonary:      Effort: Pulmonary effort is normal.      Breath sounds: Normal breath sounds.   Abdominal:      General: Bowel sounds are normal. There is no distension.      Palpations: Abdomen is soft. Abdomen is not rigid. There is no

## 2024-08-31 PROBLEM — F01.B0 MODERATE VASCULAR DEMENTIA WITHOUT BEHAVIORAL DISTURBANCE, PSYCHOTIC DISTURBANCE, MOOD DISTURBANCE, OR ANXIETY (HCC): Status: ACTIVE | Noted: 2020-09-09

## 2024-09-11 DIAGNOSIS — K59.1 FUNCTIONAL DIARRHEA: ICD-10-CM

## 2024-09-11 RX ORDER — DIPHENOXYLATE HCL/ATROPINE 2.5-.025MG
TABLET ORAL
Qty: 40 TABLET | Refills: 0 | Status: SHIPPED | OUTPATIENT
Start: 2024-09-11 | End: 2024-09-21

## 2024-09-17 ENCOUNTER — OFFICE VISIT (OUTPATIENT)
Dept: FAMILY MEDICINE CLINIC | Age: 84
End: 2024-09-17
Payer: MEDICARE

## 2024-09-17 VITALS
TEMPERATURE: 97.3 F | DIASTOLIC BLOOD PRESSURE: 60 MMHG | SYSTOLIC BLOOD PRESSURE: 94 MMHG | RESPIRATION RATE: 16 BRPM | HEART RATE: 67 BPM

## 2024-09-17 DIAGNOSIS — R30.0 DYSURIA: Primary | ICD-10-CM

## 2024-09-17 DIAGNOSIS — N30.01 ACUTE CYSTITIS WITH HEMATURIA: ICD-10-CM

## 2024-09-17 LAB
BACTERIA URINE, POC: ABNORMAL
BILIRUBIN URINE: 0 MG/DL
BLOOD, URINE: POSITIVE
CASTS URINE, POC: 0
CLARITY, UA: ABNORMAL
COLOR, UA: ABNORMAL
CRYSTALS URINE, POC: 0
EPI CELLS URINE, POC: 2
GLUCOSE URINE: NEGATIVE
KETONES, URINE: NEGATIVE
LEUKOCYTE EST, POC: POSITIVE
NITRITE, URINE: NEGATIVE
PH UA: 5.5 (ref 4.5–8)
PROTEIN UA: POSITIVE
RBC URINE, POC: ABNORMAL
SPECIFIC GRAVITY UA: 1.01 (ref 1–1.03)
UROBILINOGEN, URINE: ABNORMAL
WBC URINE, POC: ABNORMAL
YEAST URINE, POC: 0

## 2024-09-17 PROCEDURE — G8399 PT W/DXA RESULTS DOCUMENT: HCPCS | Performed by: FAMILY MEDICINE

## 2024-09-17 PROCEDURE — 1123F ACP DISCUSS/DSCN MKR DOCD: CPT | Performed by: FAMILY MEDICINE

## 2024-09-17 PROCEDURE — 99213 OFFICE O/P EST LOW 20 MIN: CPT | Performed by: FAMILY MEDICINE

## 2024-09-17 PROCEDURE — 81000 URINALYSIS NONAUTO W/SCOPE: CPT | Performed by: FAMILY MEDICINE

## 2024-09-17 PROCEDURE — G8419 CALC BMI OUT NRM PARAM NOF/U: HCPCS | Performed by: FAMILY MEDICINE

## 2024-09-17 PROCEDURE — 3078F DIAST BP <80 MM HG: CPT | Performed by: FAMILY MEDICINE

## 2024-09-17 PROCEDURE — 1090F PRES/ABSN URINE INCON ASSESS: CPT | Performed by: FAMILY MEDICINE

## 2024-09-17 PROCEDURE — 1036F TOBACCO NON-USER: CPT | Performed by: FAMILY MEDICINE

## 2024-09-17 PROCEDURE — 3074F SYST BP LT 130 MM HG: CPT | Performed by: FAMILY MEDICINE

## 2024-09-17 PROCEDURE — G8428 CUR MEDS NOT DOCUMENT: HCPCS | Performed by: FAMILY MEDICINE

## 2024-09-17 RX ORDER — CIPROFLOXACIN 500 MG/1
500 TABLET, FILM COATED ORAL 2 TIMES DAILY
Qty: 10 TABLET | Refills: 0 | Status: SHIPPED | OUTPATIENT
Start: 2024-09-17 | End: 2024-09-22

## 2024-09-18 LAB — BACTERIA UR CULT: NORMAL

## 2024-09-24 ENCOUNTER — OFFICE VISIT (OUTPATIENT)
Dept: CARDIOLOGY CLINIC | Age: 84
End: 2024-09-24
Payer: MEDICARE

## 2024-09-24 ENCOUNTER — NURSE ONLY (OUTPATIENT)
Dept: CARDIOLOGY CLINIC | Age: 84
End: 2024-09-24

## 2024-09-24 VITALS
BODY MASS INDEX: 16.27 KG/M2 | HEART RATE: 70 BPM | SYSTOLIC BLOOD PRESSURE: 120 MMHG | DIASTOLIC BLOOD PRESSURE: 56 MMHG | HEIGHT: 63 IN | WEIGHT: 91.8 LBS

## 2024-09-24 DIAGNOSIS — R00.1 SYMPTOMATIC BRADYCARDIA: ICD-10-CM

## 2024-09-24 DIAGNOSIS — Z95.0 PACEMAKER: ICD-10-CM

## 2024-09-24 DIAGNOSIS — I48.0 PAF (PAROXYSMAL ATRIAL FIBRILLATION) (HCC): Primary | ICD-10-CM

## 2024-09-24 DIAGNOSIS — I34.0 NON-RHEUMATIC MITRAL REGURGITATION: ICD-10-CM

## 2024-09-24 DIAGNOSIS — I25.10 CORONARY ARTERY DISEASE INVOLVING NATIVE HEART WITHOUT ANGINA PECTORIS, UNSPECIFIED VESSEL OR LESION TYPE: ICD-10-CM

## 2024-09-24 DIAGNOSIS — Z95.818 PRESENCE OF WATCHMAN LEFT ATRIAL APPENDAGE CLOSURE DEVICE: ICD-10-CM

## 2024-09-24 DIAGNOSIS — I63.9 CEREBROVASCULAR ACCIDENT (CVA), UNSPECIFIED MECHANISM (HCC): ICD-10-CM

## 2024-09-24 DIAGNOSIS — I49.5 SICK SINUS SYNDROME (HCC): ICD-10-CM

## 2024-09-24 DIAGNOSIS — I50.42 CHRONIC COMBINED SYSTOLIC (CONGESTIVE) AND DIASTOLIC (CONGESTIVE) HEART FAILURE (HCC): ICD-10-CM

## 2024-09-24 DIAGNOSIS — I10 ESSENTIAL HYPERTENSION, BENIGN: ICD-10-CM

## 2024-09-24 DIAGNOSIS — Z95.0 PACEMAKER: Primary | ICD-10-CM

## 2024-09-24 PROCEDURE — 1090F PRES/ABSN URINE INCON ASSESS: CPT | Performed by: NURSE PRACTITIONER

## 2024-09-24 PROCEDURE — 99214 OFFICE O/P EST MOD 30 MIN: CPT | Performed by: NURSE PRACTITIONER

## 2024-09-24 PROCEDURE — G8399 PT W/DXA RESULTS DOCUMENT: HCPCS | Performed by: NURSE PRACTITIONER

## 2024-09-24 PROCEDURE — 1123F ACP DISCUSS/DSCN MKR DOCD: CPT | Performed by: NURSE PRACTITIONER

## 2024-09-24 PROCEDURE — 1036F TOBACCO NON-USER: CPT | Performed by: NURSE PRACTITIONER

## 2024-09-24 PROCEDURE — 3074F SYST BP LT 130 MM HG: CPT | Performed by: NURSE PRACTITIONER

## 2024-09-24 PROCEDURE — 3078F DIAST BP <80 MM HG: CPT | Performed by: NURSE PRACTITIONER

## 2024-09-24 PROCEDURE — G8427 DOCREV CUR MEDS BY ELIG CLIN: HCPCS | Performed by: NURSE PRACTITIONER

## 2024-09-24 PROCEDURE — G8419 CALC BMI OUT NRM PARAM NOF/U: HCPCS | Performed by: NURSE PRACTITIONER

## 2024-09-24 PROCEDURE — 93000 ELECTROCARDIOGRAM COMPLETE: CPT | Performed by: NURSE PRACTITIONER

## 2024-09-26 ENCOUNTER — TELEPHONE (OUTPATIENT)
Dept: FAMILY MEDICINE CLINIC | Age: 84
End: 2024-09-26

## 2024-09-26 RX ORDER — CEFDINIR 300 MG/1
300 CAPSULE ORAL 2 TIMES DAILY
Qty: 6 CAPSULE | Refills: 0 | Status: SHIPPED | OUTPATIENT
Start: 2024-09-26 | End: 2024-09-29

## 2024-09-30 DIAGNOSIS — M15.0 PRIMARY OSTEOARTHRITIS INVOLVING MULTIPLE JOINTS: ICD-10-CM

## 2024-09-30 RX ORDER — HYDROCODONE BITARTRATE AND ACETAMINOPHEN 5; 325 MG/1; MG/1
1 TABLET ORAL 4 TIMES DAILY PRN
Qty: 120 TABLET | Refills: 0 | Status: SHIPPED | OUTPATIENT
Start: 2024-09-30 | End: 2024-10-01 | Stop reason: SDUPTHER

## 2024-09-30 NOTE — TELEPHONE ENCOUNTER
HYDROcodone-acetaminophen (NORCO) 5-325 MG per tablet       [facility] [id number] - [place], [place] - [address] - P [phone] - F [phone]  [address] [address]  Phone: [phone]  Fax: [phone]       Patient is getting choked 3 to 4 times a week thinks her throat may need stretched.     Please call back.    Please advise

## 2024-10-01 RX ORDER — HYDROCODONE BITARTRATE AND ACETAMINOPHEN 5; 325 MG/1; MG/1
1 TABLET ORAL 4 TIMES DAILY PRN
Qty: 120 TABLET | Refills: 0 | Status: SHIPPED | OUTPATIENT
Start: 2024-10-01 | End: 2024-10-31

## 2024-10-01 NOTE — TELEPHONE ENCOUNTER
Daughter called and Aashish is out of the medication and she is requesting the medication to be rerouted to:    [facility] #[id number] - [place], [place] - [address] [phone] - F [phone] [[id number]]

## 2024-10-25 ENCOUNTER — OFFICE VISIT (OUTPATIENT)
Dept: FAMILY MEDICINE CLINIC | Age: 84
End: 2024-10-25

## 2024-10-25 VITALS
BODY MASS INDEX: 15.94 KG/M2 | TEMPERATURE: 98 F | WEIGHT: 90 LBS | SYSTOLIC BLOOD PRESSURE: 128 MMHG | HEART RATE: 80 BPM | DIASTOLIC BLOOD PRESSURE: 60 MMHG | OXYGEN SATURATION: 99 %

## 2024-10-25 DIAGNOSIS — R30.0 DYSURIA: ICD-10-CM

## 2024-10-25 DIAGNOSIS — N30.01 ACUTE CYSTITIS WITH HEMATURIA: Primary | ICD-10-CM

## 2024-10-25 DIAGNOSIS — R19.7 DIARRHEA OF PRESUMED INFECTIOUS ORIGIN: ICD-10-CM

## 2024-10-25 LAB
BILIRUBIN, POC: NEGATIVE
BLOOD URINE, POC: NORMAL
CLARITY, POC: NORMAL
COLOR, POC: YELLOW
GLUCOSE URINE, POC: NEGATIVE MG/DL
KETONES, POC: NEGATIVE MG/DL
LEUKOCYTE EST, POC: NORMAL
NITRITE, POC: NEGATIVE
PH, POC: 5.5
PROTEIN, POC: NORMAL MG/DL
SPECIFIC GRAVITY, POC: 1.01
UROBILINOGEN, POC: 0.2 MG/DL

## 2024-10-25 RX ORDER — CIPROFLOXACIN 250 MG/1
250 TABLET, FILM COATED ORAL 2 TIMES DAILY
Qty: 10 TABLET | Refills: 0 | Status: SHIPPED | OUTPATIENT
Start: 2024-10-25 | End: 2024-10-30

## 2024-10-25 ASSESSMENT — ENCOUNTER SYMPTOMS
COUGH: 0
VOMITING: 0
DIARRHEA: 0
SHORTNESS OF BREATH: 0
NAUSEA: 0

## 2024-10-25 NOTE — PROGRESS NOTES
Physical Exam  Constitutional:       General: She is not in acute distress.     Appearance: Normal appearance.   Neurological:      Mental Status: She is alert and oriented to person, place, and time.   Psychiatric:         Mood and Affect: Mood normal.         Speech: Speech normal.         Behavior: Behavior normal.       Results for orders placed or performed in visit on 10/25/24   POCT Urinalysis no Micro   Result Value Ref Range    Color, UA yellow     Clarity, UA cloudy     Glucose, UA POC negative mg/dL    Bilirubin, UA negative     Ketones, UA negative mg/dL    Spec Grav, UA 1.015     Blood, UA POC moderate     pH, UA 5.5     Protein, UA POC trace mg/dL    Urobilinogen, UA 0.2 mg/dL    Leukocytes, UA small     Nitrite, UA negative      Assessment/Plan    1. Acute cystitis with hematuria  U/A shows likely infection. Will initiate Cipro and send for Urine culture.    - ciprofloxacin (CIPRO) 250 MG tablet; Take 1 tablet by mouth 2 times daily for 5 days  Dispense: 10 tablet; Refill: 0    2. Diarrhea of presumed infectious origin  Worsening. Will recent antibiotic use and hx of C.Diff will get stool studies and make treatment plan based on results.  - C DIFF TOXIN/ANTIGEN; Future  - GI Bacterial Pathogens By PCR; Future    3. Dysuria  - POCT Urinalysis no Micro  - Culture, Urine     Staci Menezes was counseled regarding symptoms of current diagnosis, course and complications of disease if inadequately treated.  Discussed side effects of medications, diagnosis, treatment options, and prognosis along with risks, benefits, complications, and alternatives of treatment including labs, imaging and other studies/treatment targets and goals.  She verbalized understanding of instructions and counseling.    Return if symptoms worsen or fail to improve.    Medical decision making of moderate complexity.

## 2024-10-27 LAB
BACTERIA UR CULT: ABNORMAL
BACTERIA UR CULT: ABNORMAL
ORGANISM: ABNORMAL

## 2024-11-01 DIAGNOSIS — M15.0 PRIMARY OSTEOARTHRITIS INVOLVING MULTIPLE JOINTS: ICD-10-CM

## 2024-11-01 RX ORDER — HYDROCODONE BITARTRATE AND ACETAMINOPHEN 5; 325 MG/1; MG/1
1 TABLET ORAL 4 TIMES DAILY PRN
Qty: 120 TABLET | Refills: 0 | Status: SHIPPED | OUTPATIENT
Start: 2024-11-01 | End: 2024-12-01

## 2024-11-01 NOTE — TELEPHONE ENCOUNTER
Medication:   Requested Prescriptions     Pending Prescriptions Disp Refills    HYDROcodone-acetaminophen (NORCO) 5-325 MG per tablet 120 tablet 0     Sig: Take 1 tablet by mouth 4 times daily as needed for Pain for up to 30 days.      Last Filled:  10/1    Patient Phone Number: [phone] (home)     Last appt: 10/25/2024   Next appt: 12/10/2024    Last OARRS:       6/1/2021     4:12 PM   RX Monitoring   Periodic Controlled Substance Monitoring Possible medication side effects, risk of tolerance/dependence & alternative treatments discussed.     PDMP Monitoring:    Last PDMP [place] as Reviewed ([place]):  Review User Review Instant Review Result   MOHINDER GOLDSTEIN 8/4/2023  1:05 PM Reviewed PDMP [1]     Preferred Pharmacy:   [id number] - [place], [place] - [address] P [phone] - F [phone]  [address] [address]  Phone: [phone] Fax: [phone]    [facility] [place] [facility] - [address] [facility] [facility] - P [phone] - F [phone]  [facility]  [place] [address]  Phone: [phone] Fax: [phone]    [id number] [place], [place] - [address] [phone] - F [phone]  [address] [place] [address]  Phone: [phone] Fax: [phone]

## 2024-11-11 RX ORDER — ALLOPURINOL 100 MG/1
100 TABLET ORAL DAILY
Qty: 90 TABLET | Refills: 0 | Status: SHIPPED | OUTPATIENT
Start: 2024-11-11

## 2024-11-15 DIAGNOSIS — R19.7 DIARRHEA OF PRESUMED INFECTIOUS ORIGIN: ICD-10-CM

## 2024-11-15 LAB — C DIFF TOX A+B STL QL IA: NORMAL

## 2024-11-16 LAB — GI PATHOGENS PNL STL NAA+PROBE: NORMAL

## 2024-11-19 ENCOUNTER — TELEPHONE (OUTPATIENT)
Dept: FAMILY MEDICINE CLINIC | Age: 84
End: 2024-11-19

## 2024-11-19 NOTE — TELEPHONE ENCOUNTER
Patient is returning call she received in regards to her mothers lab results. Please give her a call back at [phone].     Please advise.

## 2024-11-24 NOTE — PROCEDURES
Facility/Department: [address]  MODIFIED BARIUM SWALLOW EVALUATION    Patient: Kirstin Miller   : 1940   MRN: [id number]      Evaluation Date: 2023   Admitting Diagnosis: Generalized weakness [R53.1]  Myelodysplasia (myelodysplastic syndrome) (HCC) [D46.9]  Bilateral pleural effusion [J90]  Acute on chronic systolic CHF (congestive heart failure) ([facility]) [I50.23]  Hypervolemia, unspecified hypervolemia type [E87.70]  Treatment Diagnosis: Dysphagia   Pain: Denies                       Date of Evaluation: 2023  Type of Study: Modified Barium Swallowing Study (MBS)  Diet Prior to Study:  Dysphagia I Pureed with mildly thick liquids per SLP recommendation       Impression:  Modified Barium Swallow evaluation completed on 2023. Patient presents with moderate oropharyngeal dysphagia with deep SILENT laryngeal penetration to the level of the vocal cords noted with thin liquids. Although no overt aspiration with thin liquids was directly viewed, eventual aspiration of penetrated material is likely due to depth of laryngeal penetration and ABSENT sensation for reflexive cough to clear penetrate from airway. Pt tolerated mildly thick liquids with no overt laryngeal penetration or aspiration directly viewed. Main factors contributing to severity of oropharyngeal dysphagia include: reduced bolus control and A-P propulsion, oral pooling in L lateral sulcus and under the tongue, delayed palatal retraction for cohesive oropharyngeal bolus transfer, piecemeal and premature bolus loss to pharynx, pharyngeal pooling prior to swallow initiation (to pyriform sinuses with thins and nectars; to valleculae with increased textures), reduced tongue based retraction, reduced glottal closure, stasis after the swallow, and ABSENT sensation for reflexive cough and for spontaneous pharyngeal clearing.      Aspiration/Penetration Risk:  High risk with thin liquids  Increased risk with all textures due to sudden onset

## 2024-11-26 ENCOUNTER — TELEPHONE (OUTPATIENT)
Dept: FAMILY MEDICINE CLINIC | Age: 84
End: 2024-11-26

## 2024-11-26 DIAGNOSIS — N18.32 CHRONIC RENAL FAILURE, STAGE 3B (HCC): ICD-10-CM

## 2024-11-26 DIAGNOSIS — F01.B0 MODERATE VASCULAR DEMENTIA WITHOUT BEHAVIORAL DISTURBANCE, PSYCHOTIC DISTURBANCE, MOOD DISTURBANCE, OR ANXIETY (HCC): Primary | ICD-10-CM

## 2024-11-26 DIAGNOSIS — E43 SEVERE MALNUTRITION (HCC): ICD-10-CM

## 2024-11-26 DIAGNOSIS — I50.42 CHRONIC COMBINED SYSTOLIC (CONGESTIVE) AND DIASTOLIC (CONGESTIVE) HEART FAILURE (HCC): ICD-10-CM

## 2024-11-26 NOTE — TELEPHONE ENCOUNTER
Please send Hospice Referral  to [facility] at fax # [phone].     Please advise.

## 2024-11-27 ENCOUNTER — TELEPHONE (OUTPATIENT)
Dept: FAMILY MEDICINE CLINIC | Age: 84
End: 2024-11-27

## 2024-11-27 DIAGNOSIS — N30.01 ACUTE CYSTITIS WITH HEMATURIA: ICD-10-CM

## 2024-11-27 RX ORDER — CIPROFLOXACIN 250 MG/1
250 TABLET, FILM COATED ORAL 2 TIMES DAILY
Qty: 14 TABLET | Refills: 0 | Status: SHIPPED | OUTPATIENT
Start: 2024-11-27 | End: 2024-12-04

## 2024-11-27 NOTE — TELEPHONE ENCOUNTER
Prescription sent.  If she continues to decline they need to call EMS and take her to the hospital.

## 2024-11-27 NOTE — TELEPHONE ENCOUNTER
Patient's daughter is concerned pt has a UTI, you saw  her for one about a month ago.  Daughter said her urine has a very strong odor, very weak, disoriented, low grade fever at night.  She said there is no way she can bring her in due to her weakness can not get her here.  Would like to know if you will send in an ATB    [facility]

## 2024-12-10 NOTE — TELEPHONE ENCOUNTER
Certainly we can give her a list of dermatologist
Patient called and would like to know if you can give her the name of a dermatologist that she can schedule an appointment. She is itching all over her body.  States it has been going on for about 2 months      Please advise and give her a callback
Patient given names and phone numbers to multiple dermatologists.
Please advise
0

## 2025-07-15 NOTE — TELEPHONE ENCOUNTER
Caryl Mills the daughter of Staci Menezes called returning your call in regards to her labs. Please give her a call back at [phone].     Please advise.   
Left another message on voicemail to call.  
See result notes  
complains of pain/discomfort
no

## (undated) DEVICE — DISPOSABLE OR TOWEL: Brand: CARDINAL HEALTH

## (undated) DEVICE — Z INACTIVE USE 2535480 CLIP LIG M BLU TI HRT SHP WIRE HORZ 180 PER BX

## (undated) DEVICE — DRAPE,LAP,CHOLE,W/TROUGHS,STERILE: Brand: MEDLINE

## (undated) DEVICE — PORT VLV 2 W NDL FREE SMRTSITE

## (undated) DEVICE — STANDARD HYPODERMIC NEEDLE,POLYPROPYLENE HUB: Brand: MONOJECT

## (undated) DEVICE — SUTURE PERMAHAND SZ 3-0 L18IN NONABSORBABLE BLK SILK BRAID A184H

## (undated) DEVICE — SYRINGE MED 3ML CLR PLAS STD N CTRL LUERLOCK TIP DISP

## (undated) DEVICE — APPLICATOR PREP 26ML 0.7% IOD POVACRYLEX 74% ISO ALC ST

## (undated) DEVICE — SUTURE PERMA-HAND 0 L18IN NONABSORBABLE BLK SILK BRAID W/O SA66G

## (undated) DEVICE — BLADE CLIPPER GEN PURP NS

## (undated) DEVICE — LOTION PREP REMV 5OZ IODO CLR TINC OF BENZ DURAPREP

## (undated) DEVICE — MAJOR SET UP PK

## (undated) DEVICE — SUTURE PROL SZ 6-0 L24IN NONABSORBABLE BLU L13MM C-1 3/8 8726H

## (undated) DEVICE — SUTURE NONABSORBABLE MONOFILAMENT 5-0 C-1 1X24 IN PROLENE 8725H

## (undated) DEVICE — SUTURE PERMAHAND SZ 4-0 L18IN NONABSORBABLE BLK SILK BRAID A183H

## (undated) DEVICE — Device: Brand: JELCO

## (undated) DEVICE — TOWEL,OR,DSP,ST,BLUE,DLX,10/PK,8PK/CS: Brand: MEDLINE

## (undated) DEVICE — 35 ML SYRINGE LUER-LOCK TIP: Brand: MONOJECT

## (undated) DEVICE — GAUZE,SPONGE,4"X4",8PLY,STRL,LF,10/TRAY: Brand: MEDLINE

## (undated) DEVICE — SUTURE VCRL SZ 2-0 L36IN ABSRB UD L36MM CT-1 1/2 CIR J945H

## (undated) DEVICE — STERILE POLYISOPRENE POWDER-FREE SURGICAL GLOVES: Brand: PROTEXIS

## (undated) DEVICE — SET BLD COLL 21GA TB L12IN NDL L0.75IN WNG GRN SIMP EZ TO

## (undated) DEVICE — NEEDLE SPNL 22GA L3.5IN BLK HUB S STL REG WALL FIT STYL W/

## (undated) DEVICE — BANDAGE COBAN 4 IN COMPR W4INXL5YD FOAM COHESIVE QUIK STK SELF ADH SFT

## (undated) DEVICE — TOWEL,OR,DSP,ST,WHITE,DLX,XR,4/PK,20PK/C: Brand: MEDLINE

## (undated) DEVICE — 3M™ STERI-STRIP™ REINFORCED ADHESIVE SKIN CLOSURES, R1547, 1/2 IN X 4 IN (12 MM X 100 MM), 6 STRIPS/ENVELOPE: Brand: 3M™ STERI-STRIP™

## (undated) DEVICE — SYRINGE, LUER LOCK, 10ML: Brand: MEDLINE

## (undated) DEVICE — SHEET,DRAPE,53X77,STERILE: Brand: MEDLINE

## (undated) DEVICE — KIT OR ROOM TURNOVER W/STRAP

## (undated) DEVICE — SET EXTN PRIMING 4.9ML L30IN INCL SLDE CLMP SPIN M LUERLOCK

## (undated) DEVICE — VI-DRAPE ISOLATION BAG: Brand: CONVERTORS

## (undated) DEVICE — PEN: MARKING STD 100/CS: Brand: MEDICAL ACTION INDUSTRIES

## (undated) DEVICE — CHLORAPREP 26ML ORANGE

## (undated) DEVICE — SUTURE PERMAHAND SZ 2-0 L12X18IN NONABSORBABLE BLK SILK A185H

## (undated) DEVICE — SOLUTION IV IRRIG POUR BRL 0.9% SODIUM CHL 2F7124

## (undated) DEVICE — BLADE ES ELASTOMERIC COAT INSUL DURABLE BEND UPTO 90DEG

## (undated) DEVICE — SUTURE MCRYL SZ 4-0 L27IN ABSRB UD L19MM PS-2 1/2 CIR PRIM Y426H

## (undated) DEVICE — GOWN SIRUS NONREIN XL W/TWL: Brand: MEDLINE INDUSTRIES, INC.

## (undated) DEVICE — SUTURE PROL SZ 6-0 L24IN NONABSORBABLE BLU L9.3MM BV-1 3/8 8805H

## (undated) DEVICE — DRAPE,REIN 53X77,STERILE: Brand: MEDLINE

## (undated) DEVICE — PAD THRM M SPL TORSO

## (undated) DEVICE — SUTURE PROL SZ 5-0 L24IN NONABSORBABLE BLU L17MM RB-1 1/2 8555H

## (undated) DEVICE — Device

## (undated) DEVICE — STERILE LATEX POWDER FREE SURGICAL GLOVES WITH HYDROGEL COATING: Brand: PROTEXIS

## (undated) DEVICE — ADHESIVE SKIN CLSR 0.7ML TOP DERMBND ADV

## (undated) DEVICE — HYPODERMIC SAFETY NEEDLE: Brand: MAGELLAN

## (undated) DEVICE — INTENDED FOR TISSUE SEPARATION, AND OTHER PROCEDURES THAT REQUIRE A SHARP SURGICAL BLADE TO PUNCTURE OR CUT.: Brand: BARD-PARKER ® STAINLESS STEEL BLADES

## (undated) DEVICE — X-RAY DETECTABLE SPONGES,16 PLY: Brand: VISTEC

## (undated) DEVICE — INTENDED TO BE USED TO OCCLUDE, RETRACT AND IDENTIFY ARTERIES, VEINS, TENDONS AND NERVES IN SURGICAL PROCEDURES: Brand: STERION®  VESSEL LOOP

## (undated) DEVICE — MEDIA CONTRAST RX ISOVUE-300 61% 30ML VIALS

## (undated) DEVICE — UNIVERSAL BLOCK TRAY: Brand: AVANOS*

## (undated) DEVICE — DECANTER: Brand: UNBRANDED

## (undated) DEVICE — SUTURE VCRL SZ 3-0 L36IN ABSRB UD L36MM CT-1 1/2 CIR J944H

## (undated) DEVICE — FOGARTY - HYDRAGRIP SURGICAL - CLAMP INSERTS: Brand: FOGARTY SOFTJAW

## (undated) DEVICE — SUTURE PERMA-HAND SZ 2-0 L30IN NONABSORBABLE BLK L26MM SH K833H

## (undated) DEVICE — SUTURE PROL SZ 3-0 L36IN NONABSORBABLE BLU L17MM RB-1 1/2 8558H

## (undated) DEVICE — 3M™ IOBAN™ 2 ANTIMICROBIAL INCISE DRAPE 6650EZ: Brand: IOBAN™ 2

## (undated) DEVICE — LOOP,VESSEL,MAXI,BLUE,2/PK,STERILE: Brand: MEDLINE

## (undated) DEVICE — SUTURE BOOT: Brand: DEROYAL

## (undated) DEVICE — CLIP INT SM WIDE RED TI TRNSVRS GRV CHEVRON SHP W/ PRECIS